# Patient Record
Sex: FEMALE | Race: WHITE | NOT HISPANIC OR LATINO | Employment: OTHER | URBAN - METROPOLITAN AREA
[De-identification: names, ages, dates, MRNs, and addresses within clinical notes are randomized per-mention and may not be internally consistent; named-entity substitution may affect disease eponyms.]

---

## 2017-01-05 ENCOUNTER — OFFICE VISIT (OUTPATIENT)
Dept: WOUND CARE | Facility: CLINIC | Age: 72
End: 2017-01-05
Payer: MEDICARE

## 2017-01-05 VITALS
HEART RATE: 68 BPM | HEIGHT: 67 IN | DIASTOLIC BLOOD PRESSURE: 54 MMHG | SYSTOLIC BLOOD PRESSURE: 138 MMHG | TEMPERATURE: 97 F

## 2017-01-05 DIAGNOSIS — I83.019 VENOUS ULCER OF BOTH LOWER EXTREMITIES WITH VARICOSE VEINS: ICD-10-CM

## 2017-01-05 DIAGNOSIS — L97.512 ULCER OF TOE, RIGHT, WITH FAT LAYER EXPOSED: ICD-10-CM

## 2017-01-05 DIAGNOSIS — L97.922 ULCER OF LEFT LOWER EXTREMITY WITH FAT LAYER EXPOSED: ICD-10-CM

## 2017-01-05 DIAGNOSIS — I87.2 VENOUS STASIS DERMATITIS OF BOTH LOWER EXTREMITIES: ICD-10-CM

## 2017-01-05 DIAGNOSIS — L97.919 VENOUS ULCER OF BOTH LOWER EXTREMITIES WITH VARICOSE VEINS: ICD-10-CM

## 2017-01-05 DIAGNOSIS — L97.912 ULCER OF LOWER LIMB, RIGHT, WITH FAT LAYER EXPOSED: ICD-10-CM

## 2017-01-05 DIAGNOSIS — I87.2 VENOUS INSUFFICIENCY OF BOTH LOWER EXTREMITIES: ICD-10-CM

## 2017-01-05 DIAGNOSIS — R60.0 BILATERAL LEG EDEMA: ICD-10-CM

## 2017-01-05 DIAGNOSIS — B35.3 TINEA PEDIS OF BOTH FEET: Chronic | ICD-10-CM

## 2017-01-05 DIAGNOSIS — I83.029 VENOUS ULCER OF BOTH LOWER EXTREMITIES WITH VARICOSE VEINS: ICD-10-CM

## 2017-01-05 DIAGNOSIS — L97.929 VENOUS ULCER OF BOTH LOWER EXTREMITIES WITH VARICOSE VEINS: ICD-10-CM

## 2017-01-05 PROCEDURE — 99999 PR PBB SHADOW E&M-EST. PATIENT-LVL III: CPT | Mod: PBBFAC,,, | Performed by: NURSE PRACTITIONER

## 2017-01-05 PROCEDURE — 29580 STRAPPING UNNA BOOT: CPT | Mod: RT,S$GLB,, | Performed by: NURSE PRACTITIONER

## 2017-01-05 PROCEDURE — 99499 UNLISTED E&M SERVICE: CPT | Mod: S$GLB,,, | Performed by: NURSE PRACTITIONER

## 2017-01-05 RX ORDER — ECONAZOLE NITRATE 10 MG/G
1 CREAM TOPICAL DAILY
Qty: 85 G | Refills: 2 | Status: SHIPPED | OUTPATIENT
Start: 2017-01-05 | End: 2018-05-25

## 2017-01-05 NOTE — PATIENT INSTRUCTIONS
Elevate legs as much as possible. Do not get the dressings wet and use cast covers for showering.  Should the dressing become wet, remove it, place a wet-to-dry dressing over the wound, cover with gauze and roll gauze and use ace wraps for compression and to secure bandages.  Notify home health as soon as possible to have a new dressing applied.

## 2017-01-05 NOTE — MR AVS SNAPSHOT
[facility]  [address]  [facility] [address]  Phone: [phone]                  Sherin Ortiz   2017 2:00 PM   Office Visit    Description:  Female : 1945   Provider:  Akosua Alvarenga NP   Department:  [facility]           Reason for Visit     Wound Check           Diagnoses this Visit        Comments    Uncontrolled type 2 diabetes mellitus with complication, with long-term current use of insulin    -  Primary     Ulcer of lower limb, right, with fat layer exposed         Ulcer of left lower extremity with fat layer exposed         Ulcer of toe, right, with fat layer exposed         Venous ulcer of both lower extremities with varicose veins         Tinea pedis of both feet         Venous stasis dermatitis of both lower extremities         Bilateral leg edema         Venous insufficiency of both lower extremities                To Do List           Future Appointments        Provider Department Dept Phone    2017 2:00 PM [facility] [phone]    2017 11:30 AM Ame Vasquez MD [facility] - Internal Medicine [phone]      Goals (5 Years of Data)     None      Follow-Up and Disposition     Return in about 2 weeks (around 2017) for Wound evaluation.      [facility]     [facility] On Call Nurse Care Line -  Assistance  Registered nurses in the [facility] On Call Center provide clinical advisement, health education, appointment booking, and other advisory services.  Call for this free service at [phone].             Medications           Message regarding Medications     Verify the changes and/or additions to your medication regime listed below are the same as discussed with your clinician today.  If any of these changes or additions are incorrect, please notify your healthcare provider.             Verify that the below list of medications is an accurate representation of the medications you are currently  "taking.  If none reported, the list may be blank. If incorrect, please contact your healthcare provider. Carry this list with you in case of emergency.           Current Medications     ACCU-CHEK RAMIRO PLUS METER Misc     ACCU-CHEK RAMIRO PLUS TEST STRP Strp TEST TWICE DAILY    ACCU-CHEK SOFTCLIX LANCETS Misc     aspirin 81 MG Chew Take 81 mg by mouth once daily.      atorvastatin (LIPITOR) 40 MG tablet TAKE 1 TABLET EVERY EVENING    BD INSULIN SYRINGE ULTRA-FINE 1/2 mL 30 x 1/2" Syrg USE EVERY NIGHT    clopidogrel (PLAVIX) 75 mg tablet TAKE 1 TABLET ONE TIME DAILY    econazole nitrate 1 % cream Apply 1 application topically once daily. Apply to affected area    glimepiride (AMARYL) 1 MG tablet Take 2 tablets (2 mg total) by mouth with lunch.    LANTUS 100 unit/mL injection INJECT 7 TO 10 UNITS SUBCUTANEOUSLY IN THE EVENING DEPENDING ON SCALE (DISCARD EACH VIAL AFTER 28 DAYS)    lisinopril 10 MG tablet TAKE 1 TABLET ONE TIME DAILY           Clinical Reference Information           Vital Signs - Last Recorded  Most recent update: 1/5/2017  2:01 PM by Anna Robertson LPN    BP Pulse Temp Ht LMP       (!) 138/54 68 96.8 °F (36 °C) (Oral) 5' 7" (1.702 m) (LMP Unknown)       Blood Pressure          Most Recent Value    BP  (!)  138/54      Allergies as of 1/5/2017     Penicillins    Sulfa (Sulfonamide Antibiotics)      Immunizations Administered on Date of Encounter - 1/5/2017     None      [facility] Sign-Up     Activating your [facility] account is as easy as 1-2-3!     1) Visit [email or web address], select Sign Up Now, enter this activation code and your date of birth, then select Next.  [email or web address]  Expires: 1/29/2017  2:16 PM      2) Create a username and password to use when you visit [facility] in the future and select a security question in case you lose your password and select Next.    3) Enter your e-mail address and click Sign Up!    Additional Information  If you have questions, please e-mail " [email or web address] or call [phone] to talk to our [facility] staff. Remember, [facility] is NOT to be used for urgent needs. For medical emergencies, dial 911.         Instructions    Elevate legs as much as possible. Do not get the dressings wet and use cast covers for showering.  Should the dressing become wet, remove it, place a wet-to-dry dressing over the wound, cover with gauze and roll gauze and use ace wraps for compression and to secure bandages.  Notify home health as soon as possible to have a new dressing applied.

## 2017-01-05 NOTE — PROGRESS NOTES
Subjective:       Patient ID: Sherin Ortiz is a 72 y.o. female.    Chief Complaint: Wound Check    Wound Check     Wound Check:   This patient is seen today for reevaluation of wounds to both lower legs and the right foot.  She has unna boots on bilaterally. The wounds are healing slowly as evidenced by wound contracture.    Problems that interfere with wound healing include multiple co-morbidities, diabetes, edema, diabetic neuropathy and  morbid obesity.  The patient is afebrile. The patient ambulates with great difficulty secondary to her body habitus.  She has no pain.  She denies increased swelling, redness or purulent drainage.   She is afebrile.   Review of Systems   Constitutional: Negative for chills, diaphoresis and fever.   HENT: Negative for hearing loss, postnasal drip, rhinorrhea, sinus pressure, sneezing, sore throat, tinnitus and trouble swallowing.    Eyes: Negative for visual disturbance.   Respiratory: Negative for apnea, cough, shortness of breath and wheezing.    Cardiovascular: Positive for leg swelling. Negative for chest pain and palpitations.   Gastrointestinal: Negative for constipation, diarrhea, nausea and vomiting.   Genitourinary: Negative for difficulty urinating, dysuria, frequency and hematuria.   Musculoskeletal: Negative for arthralgias, back pain and joint swelling.   Skin: Positive for wound.   Neurological: Negative for dizziness, weakness, light-headedness and headaches.   Hematological: Does not bruise/bleed easily.   Psychiatric/Behavioral: Negative for confusion, decreased concentration, dysphoric mood and sleep disturbance. The patient is not nervous/anxious.    All other systems reviewed and are negative.      Objective:      Physical Exam   Constitutional: She is oriented to person, place, and time. No distress.   Morbidly obese   HENT:   Head: Normocephalic and atraumatic.   Neck: Normal range of motion. Neck supple.   Pulmonary/Chest: Effort normal.    Musculoskeletal: Normal range of motion. She exhibits edema. She exhibits no tenderness.        Legs:       Feet:    Neurological: She is alert and oriented to person, place, and time.   Skin: Skin is warm and dry. Rash (dermatitis and tinea bilateral lower extremities) noted. She is not diaphoretic. No cyanosis or erythema. No pallor. Nails show no clubbing.   Psychiatric: She has a normal mood and affect. Her behavior is normal. Judgment and thought content normal.   Nursing note and vitals reviewed.    ..  Hemoglobin A1C   Date Value Ref Range Status   11/08/2016 9.9 (H) 4.5 - 6.2 % Final     Comment:     According to ADA guidelines, hemoglobin A1C <7.0% represents  optimal control in non-pregnant diabetic patients.  Different  metrics may apply to specific populations.   Standards of Medical Care in Diabetes - 2016.  For the purpose of screening for the presence of diabetes:  <5.7%     Consistent with the absence of diabetes  5.7-6.4%  Consistent with increasing risk for diabetes   (prediabetes)  >or=6.5%  Consistent with diabetes  Currently no consensus exists for use of hemoglobin A1C  for diagnosis of diabetes for children.     05/26/2016 9.1 (H) 4.5 - 6.2 % Final   02/16/2016 9.1 (H) 4.5 - 6.2 % Final     ..  Lab Results   Component Value Date    ALBUMIN 2.9 (L) 11/08/2016     Sherin was seen in the clinic room and placed in the supine position on the treatment table.  Both lower legs were cleansed with Easi-clense sponges and dried thoroughly.  Medihoney gel and a hydrofiber dressing was applied to the right leg wounds.  Eucerin cream was applied to the lower legs.  The ankle was padded with ABD pads.  The patient's foot was positioned at a 90 degree angle.  A zinc oxide wrap, followed by kerlix roll gauze and coban were applied using a spiral technique avoiding creases or folds.  The wrap was started behind the first metatarsal and ended below the tibial tubercle of the knee.  There was overlap of each  turn half the width of the previous turn.  The compression wrap will be changed every 3-4 days.      Assessment:       1. Uncontrolled type 2 diabetes mellitus with complication, with long-term current use of insulin    2. Ulcer of lower limb, right, with fat layer exposed    3. Ulcer of left lower extremity with fat layer exposed    4. Ulcer of toe, right, with fat layer exposed    5. Venous ulcer of both lower extremities with varicose veins    6. Tinea pedis of both feet    7. Venous stasis dermatitis of both lower extremities    8. Bilateral leg edema        Plan:           Spectazole cream to feet and toes.  Triamcinolone cream to lower legs.  Unna boot right lower leg as detailed above.  Kerlix and coban left lower leg.  Patient was warned not to get the dressings wet and to use cast covers for showering.  Should the dressing become wet, she is to remove it, place a wet-to-dry dressing over the wound, cover with gauze and roll gauze and use ace wraps for compression and to secure bandages.  She should then notify home health as soon as possible to have a new dressing applied.  Apply hydrofiber dressing to right second to and left great toe, cotton in between toes, cover with gauze and secure with roll gauze.  Change dressing twice weekly.  Return to clinic in 2 weeks.  [facility] notified of orders via email.      Home Health Wound Care Orders.  Cleanse wounds with wound .  Spectazole cream to feet and toes daily.  Triamcinolone cream to lower legs.  Medihoney gel and hydrofiber dressing to right leg wounds.  ABD pad to both ankles and right shin.  Unna boot (zinc oxide, kerlix and coban) bilateral lower legs.  Kerlix and coban to left lower leg.  Aquacel to right second toe ulcer and left great toe ulcer, cotton in between toes, cover right foot wound with gauze and secure with roll gauze.  Change toe dressing twice weekly.  Change unna boot twice weekly.    Right shin      Right second toe      Left  great toe

## 2017-01-08 RX ORDER — BLOOD SUGAR DIAGNOSTIC
STRIP MISCELLANEOUS
Qty: 200 STRIP | Refills: 3 | Status: SHIPPED | OUTPATIENT
Start: 2017-01-08 | End: 2018-01-20 | Stop reason: SDUPTHER

## 2017-01-24 ENCOUNTER — TELEPHONE (OUTPATIENT)
Dept: WOUND CARE | Facility: CLINIC | Age: 72
End: 2017-01-24

## 2017-01-24 NOTE — TELEPHONE ENCOUNTER
----- Message from Domenic Doan sent at 1/24/2017 11:24 AM CST -----  Patient states that she needs to speak with nurse in ref to some questions about her appts//please call back at [phone]//thank you

## 2017-02-09 ENCOUNTER — OFFICE VISIT (OUTPATIENT)
Dept: WOUND CARE | Facility: CLINIC | Age: 72
End: 2017-02-09
Payer: MEDICARE

## 2017-02-09 VITALS — TEMPERATURE: 98 F | HEART RATE: 78 BPM | DIASTOLIC BLOOD PRESSURE: 77 MMHG | SYSTOLIC BLOOD PRESSURE: 134 MMHG

## 2017-02-09 DIAGNOSIS — L97.912 ULCER OF LOWER LIMB, RIGHT, WITH FAT LAYER EXPOSED: ICD-10-CM

## 2017-02-09 DIAGNOSIS — L97.919 VENOUS ULCER OF RIGHT LOWER EXTREMITY WITH VARICOSE VEINS: ICD-10-CM

## 2017-02-09 DIAGNOSIS — B35.3 TINEA PEDIS OF BOTH FEET: Chronic | ICD-10-CM

## 2017-02-09 DIAGNOSIS — L97.512 ULCER OF TOE, RIGHT, WITH FAT LAYER EXPOSED: ICD-10-CM

## 2017-02-09 DIAGNOSIS — R60.0 BILATERAL LEG EDEMA: ICD-10-CM

## 2017-02-09 DIAGNOSIS — E88.09 HYPOALBUMINEMIA: ICD-10-CM

## 2017-02-09 DIAGNOSIS — I83.019 VENOUS ULCER OF RIGHT LOWER EXTREMITY WITH VARICOSE VEINS: ICD-10-CM

## 2017-02-09 DIAGNOSIS — I87.2 VENOUS INSUFFICIENCY OF BOTH LOWER EXTREMITIES: ICD-10-CM

## 2017-02-09 DIAGNOSIS — I87.2 VENOUS STASIS DERMATITIS OF BOTH LOWER EXTREMITIES: ICD-10-CM

## 2017-02-09 PROCEDURE — 99999 PR PBB SHADOW E&M-EST. PATIENT-LVL IV: CPT | Mod: PBBFAC,,, | Performed by: NURSE PRACTITIONER

## 2017-02-09 PROCEDURE — 29580 STRAPPING UNNA BOOT: CPT | Mod: RT,S$GLB,, | Performed by: NURSE PRACTITIONER

## 2017-02-09 PROCEDURE — 99499 UNLISTED E&M SERVICE: CPT | Mod: S$GLB,,, | Performed by: NURSE PRACTITIONER

## 2017-02-09 NOTE — PROGRESS NOTES
Subjective:       Patient ID: Sherin Ortiz is a 72 y.o. female.    Chief Complaint: Wound Check    Wound Check     Wound Check:   This patient is seen today for reevaluation of wounds to the right lower leg and both feet. An unna boot was on the right leg but she removed it before coming to clinic today to shower.  She has a new wound to the proximal shin that began as a blister that erupted on 2/6/18. The wounds are healing slowly as evidenced by wound contracture.    Problems that interfere with wound healing include multiple co-morbidities, diabetes, edema, diabetic neuropathy and  morbid obesity. The patient is afebrile. The patient ambulates with great difficulty secondary to her body habitus.  She has no pain.  She denies increased swelling, redness or purulent drainage.   She is afebrile.   Review of Systems   Constitutional: Negative for chills, diaphoresis and fever.   HENT: Negative for hearing loss, postnasal drip, rhinorrhea, sinus pressure, sneezing, sore throat, tinnitus and trouble swallowing.    Eyes: Negative for visual disturbance.   Respiratory: Negative for apnea, cough, shortness of breath and wheezing.    Cardiovascular: Positive for leg swelling. Negative for chest pain and palpitations.   Gastrointestinal: Negative for constipation, diarrhea, nausea and vomiting.   Genitourinary: Negative for difficulty urinating, dysuria, frequency and hematuria.   Musculoskeletal: Negative for arthralgias, back pain and joint swelling.   Skin: Positive for wound.   Neurological: Negative for dizziness, weakness, light-headedness and headaches.   Hematological: Does not bruise/bleed easily.   Psychiatric/Behavioral: Negative for confusion, decreased concentration, dysphoric mood and sleep disturbance. The patient is not nervous/anxious.    All other systems reviewed and are negative.      Objective:      Physical Exam   Constitutional: She is oriented to person, place, and time. No distress.   Morbidly  obese   HENT:   Head: Normocephalic and atraumatic.   Neck: Normal range of motion. Neck supple.   Pulmonary/Chest: Effort normal.   Musculoskeletal: Normal range of motion. She exhibits edema. She exhibits no tenderness.        Legs:       Feet:    Neurological: She is alert and oriented to person, place, and time.   Skin: Skin is warm and dry. Rash (dermatitis and tinea bilateral lower extremities) noted. She is not diaphoretic. No cyanosis or erythema. No pallor. Nails show no clubbing.   Psychiatric: She has a normal mood and affect. Her behavior is normal. Judgment and thought content normal.   Nursing note and vitals reviewed.    ..  Hemoglobin A1C   Date Value Ref Range Status   11/08/2016 9.9 (H) 4.5 - 6.2 % Final     Comment:     According to ADA guidelines, hemoglobin A1C <7.0% represents  optimal control in non-pregnant diabetic patients.  Different  metrics may apply to specific populations.   Standards of Medical Care in Diabetes - 2016.  For the purpose of screening for the presence of diabetes:  <5.7%     Consistent with the absence of diabetes  5.7-6.4%  Consistent with increasing risk for diabetes   (prediabetes)  >or=6.5%  Consistent with diabetes  Currently no consensus exists for use of hemoglobin A1C  for diagnosis of diabetes for children.     05/26/2016 9.1 (H) 4.5 - 6.2 % Final   02/16/2016 9.1 (H) 4.5 - 6.2 % Final     ..  Lab Results   Component Value Date    ALBUMIN 2.9 (L) 11/08/2016     Sherin was seen in the clinic room and placed in the supine position on the treatment table.  Both lower legs were cleansed with Easi-clense sponges and dried thoroughly.  Medihoney gel and a hydrofiber dressing was applied to the right leg wounds.  Eucerin cream was applied to the lower legs.  The ankle was padded with ABD pads.  The patient's foot was positioned at a 90 degree angle.  A zinc oxide wrap, followed by kerlix roll gauze and coban were applied using a spiral technique avoiding creases or  folds.  The wrap was started behind the first metatarsal and ended below the tibial tubercle of the knee.  There was overlap of each turn half the width of the previous turn.  The compression wrap will be changed every 3-4 days.      Assessment:       1. Uncontrolled type 2 diabetes mellitus with complication, with long-term current use of insulin    2. Ulcer of toe, right, with fat layer exposed    3. Ulcer of lower limb, right, with fat layer exposed    4. Ulcer of left lower extremity with fat layer exposed    5. Venous ulcer of both lower extremities with varicose veins    6. Tinea pedis of both feet    7. Venous stasis dermatitis of both lower extremities    8. Hypoalbuminemia    9. Bilateral leg edema        Plan:           Spectazole cream to feet and toes.  Triamcinolone cream to lower legs.  Unna boot right lower leg as detailed above.  Kerlix and coban left lower leg.  Patient was warned not to get the dressings wet and to use cast covers for showering.  Should the dressing become wet, she is to remove it, place a wet-to-dry dressing over the wound, cover with gauze and roll gauze and use ace wraps for compression and to secure bandages.  She should then notify home health as soon as possible to have a new dressing applied.  Apply hydrofiber dressing to right second ulcer, cotton in between toes, cover with gauze and secure with roll gauze.  Change dressing twice weekly.  Return to clinic in 2 weeks.  [facility] notified of orders via email.      Home Health Wound Care Orders.  Cleanse wounds with wound .  Spectazole cream to feet and toes daily.  Triamcinolone cream to lower legs.  Medihoney gel and hydrofiber dressing to right leg wounds.  ABD pad to both ankles and right shin.  Unna boot (zinc oxide, kerlix and coban) bilateral lower legs.  Kerlix and coban to left lower leg.  Aquacel to right second toe ulcer and left great toe ulcer, cotton in between toes, cover right foot wound with gauze and  secure with roll gauze.  Change toe dressing twice weekly.  Change unna boot twice weekly.    Right shin      Right second toe      Left great toe

## 2017-02-09 NOTE — MR AVS SNAPSHOT
[facility]  [address]  [facility] [address]  Phone: [phone]                  Sherin Ortiz   2017 2:00 PM   Office Visit    Description:  Female : 1945   Provider:  Akosua Alvarenga NP   Department:  [facility]           Reason for Visit     Wound Check           Diagnoses this Visit        Comments    Uncontrolled type 2 diabetes mellitus with complication, with long-term current use of insulin    -  Primary     Ulcer of toe, right, with fat layer exposed         Ulcer of lower limb, right, with fat layer exposed         Venous ulcer of right lower extremity with varicose veins         Tinea pedis of both feet         Venous stasis dermatitis of both lower extremities         Hypoalbuminemia         Bilateral leg edema         Venous insufficiency of both lower extremities                To Do List           Future Appointments        Provider Department Dept Phone    2017 1:00 PM [facility] [phone]    3/8/2017 3:30 PM Ame Vasquez MD [facility] - Internal Medicine [phone]      Goals (5 Years of Data)     None      Follow-Up and Disposition     Return in about 2 weeks (around 2017) for Wound evaluation.    Follow-up and Disposition History      [facility]     [facility] -  Assistance  Registered nurses in the [facility] On Call Center provide clinical advisement, health education, appointment booking, and other advisory services.  Call for this free service at [phone].             Medications           Message regarding Medications     Verify the changes and/or additions to your medication regime listed below are the same as discussed with your clinician today.  If any of these changes or additions are incorrect, please notify your healthcare provider.             Verify that the below list of medications is an accurate representation of the medications you are currently  "taking.  If none reported, the list may be blank. If incorrect, please contact your healthcare provider. Carry this list with you in case of emergency.           Current Medications     ACCU-CHEK RAMIRO PLUS METER Misc     ACCU-CHEK RAMIRO PLUS TEST STRP Strp TEST TWICE DAILY    ACCU-CHEK SOFTCLIX LANCETS Misc     aspirin 81 MG Chew Take 81 mg by mouth once daily.      atorvastatin (LIPITOR) 40 MG tablet TAKE 1 TABLET EVERY EVENING    BD INSULIN SYRINGE ULTRA-FINE 1/2 mL 30 x 1/2" Syrg USE EVERY NIGHT    clopidogrel (PLAVIX) 75 mg tablet TAKE 1 TABLET ONE TIME DAILY    econazole nitrate 1 % cream Apply 1 application topically once daily. Apply to affected area    glimepiride (AMARYL) 1 MG tablet Take 2 tablets (2 mg total) by mouth with lunch.    LANTUS 100 unit/mL injection INJECT 7 TO 10 UNITS SUBCUTANEOUSLY IN THE EVENING DEPENDING ON SCALE (DISCARD EACH VIAL AFTER 28 DAYS)    lisinopril 10 MG tablet TAKE 1 TABLET ONE TIME DAILY           Clinical Reference Information           Your Vitals Were     BP Pulse Temp Last Period          134/77 78 97.5 °F (36.4 °C) (Oral) (LMP Unknown)        Blood Pressure          Most Recent Value    BP  134/77      Allergies as of 2/9/2017     Penicillins    Sulfa (Sulfonamide Antibiotics)      Immunizations Administered on Date of Encounter - 2/9/2017     None      [facility] Sign-Up     Activating your [facility] account is as easy as 1-2-3!     1) Visit [email or web address], select Sign Up Now, enter this activation code and your date of birth, then select Next.  [email or web address]  Expires: 3/26/2017  1:55 PM      2) Create a username and password to use when you visit [facility] in the future and select a security question in case you lose your password and select Next.    3) Enter your e-mail address and click Sign Up!    Additional Information  If you have questions, please e-mail [email or web address] or call [phone] to talk to our [facility] staff. Remember, [facility] is NOT to " be used for urgent needs. For medical emergencies, dial 911.         Instructions    Elevate legs as much as possible. Do not get the dressings wet and use cast covers for showering.  Should the dressing become wet, remove it, place a wet-to-dry dressing over the wound, cover with gauze and roll gauze and use ace wraps for compression and to secure bandages.  Notify home health as soon as possible to have a new dressing applied.         Language Assistance Services     ATTENTION: Language assistance services are available, free of charge. Please call [phone].      ATENCIÓN: Si habla español, tiene a hutchins disposición servicios gratuitos de asistencia lingüística. Llame al [phone].     MELINA Ý: N?u b?n nói Ti?ng Vi?t, có các d?ch v? h? tr? ngôn ng? mi?n phí dành cho b?n. G?i s? [phone].         [facility] complies with applicable Federal civil rights laws and does not discriminate on the basis of race, color, national origin, age, disability, or sex.

## 2017-02-21 ENCOUNTER — TELEPHONE (OUTPATIENT)
Dept: INTERNAL MEDICINE | Facility: CLINIC | Age: 72
End: 2017-02-21

## 2017-02-21 DIAGNOSIS — R26.9 GAIT ABNORMALITY: Primary | ICD-10-CM

## 2017-02-21 NOTE — TELEPHONE ENCOUNTER
Please advise it is ok to give a verbal to re certify pt for home health for wound care for her leg two times a week.    Also, pt is requesting orders for a bedside commode.

## 2017-02-22 NOTE — TELEPHONE ENCOUNTER
Left message informing Victorina that it is ok to continue pt home health. Also that orders for commode will be faxed to [facility].

## 2017-02-23 ENCOUNTER — OFFICE VISIT (OUTPATIENT)
Dept: WOUND CARE | Facility: CLINIC | Age: 72
End: 2017-02-23
Payer: MEDICARE

## 2017-02-23 VITALS
HEIGHT: 67 IN | HEART RATE: 81 BPM | TEMPERATURE: 98 F | DIASTOLIC BLOOD PRESSURE: 56 MMHG | SYSTOLIC BLOOD PRESSURE: 168 MMHG

## 2017-02-23 DIAGNOSIS — L97.512 ULCER OF TOE, RIGHT, WITH FAT LAYER EXPOSED: ICD-10-CM

## 2017-02-23 DIAGNOSIS — L97.922 ULCER OF LEFT LOWER EXTREMITY WITH FAT LAYER EXPOSED: ICD-10-CM

## 2017-02-23 DIAGNOSIS — B35.3 TINEA PEDIS OF BOTH FEET: Chronic | ICD-10-CM

## 2017-02-23 DIAGNOSIS — L97.912 ULCER OF LOWER LIMB, RIGHT, WITH FAT LAYER EXPOSED: ICD-10-CM

## 2017-02-23 DIAGNOSIS — R60.0 BILATERAL LEG EDEMA: ICD-10-CM

## 2017-02-23 DIAGNOSIS — I87.2 VENOUS STASIS DERMATITIS OF BOTH LOWER EXTREMITIES: ICD-10-CM

## 2017-02-23 DIAGNOSIS — I83.019 VENOUS ULCER OF BOTH LOWER EXTREMITIES WITH VARICOSE VEINS: ICD-10-CM

## 2017-02-23 DIAGNOSIS — L97.919 VENOUS ULCER OF BOTH LOWER EXTREMITIES WITH VARICOSE VEINS: ICD-10-CM

## 2017-02-23 DIAGNOSIS — L97.929 VENOUS ULCER OF BOTH LOWER EXTREMITIES WITH VARICOSE VEINS: ICD-10-CM

## 2017-02-23 DIAGNOSIS — I83.029 VENOUS ULCER OF BOTH LOWER EXTREMITIES WITH VARICOSE VEINS: ICD-10-CM

## 2017-02-23 PROCEDURE — 99499 UNLISTED E&M SERVICE: CPT | Mod: S$GLB,,, | Performed by: NURSE PRACTITIONER

## 2017-02-23 PROCEDURE — 99999 PR PBB SHADOW E&M-EST. PATIENT-LVL IV: CPT | Mod: PBBFAC,,, | Performed by: NURSE PRACTITIONER

## 2017-02-23 PROCEDURE — 29580 STRAPPING UNNA BOOT: CPT | Mod: 50,S$GLB,, | Performed by: NURSE PRACTITIONER

## 2017-02-23 NOTE — MR AVS SNAPSHOT
[facility]  [address]  [facility] [address]  Phone: [phone]                  Sherin Ortiz   2017 1:00 PM   Office Visit    Description:  Female : 1945   Provider:  Akosua Alvarenga NP   Department:  [facility]           Reason for Visit     Wound Check           Diagnoses this Visit        Comments    Uncontrolled type 2 diabetes mellitus with complication, with long-term current use of insulin    -  Primary     Ulcer of lower limb, right, with fat layer exposed         Ulcer of left lower extremity with fat layer exposed         Ulcer of toe, right, with fat layer exposed         Venous ulcer of both lower extremities with varicose veins         Tinea pedis of both feet         Venous stasis dermatitis of both lower extremities         Bilateral leg edema                To Do List           Future Appointments        Provider Department Dept Phone    3/8/2017 3:30 PM MD [facility] [phone]      Goals (5 Years of Data)     None      Follow-Up and Disposition     Return in about 2 weeks (around 3/9/2017) for Wound evaluation.      [facility]     [facility] On Call Nurse [facility] Line - 24/7 Assistance  Registered nurses in the [facility] On Call Center provide clinical advisement, health education, appointment booking, and other advisory services.  Call for this free service at [phone].             Medications           Message regarding Medications     Verify the changes and/or additions to your medication regime listed below are the same as discussed with your clinician today.  If any of these changes or additions are incorrect, please notify your healthcare provider.             Verify that the below list of medications is an accurate representation of the medications you are currently taking.  If none reported, the list may be blank. If incorrect, please contact your healthcare provider. Carry this list with you  "in case of emergency.           Current Medications     ACCU-CHEK RAMIRO PLUS METER Misc     ACCU-CHEK RAMIRO PLUS TEST STRP Strp TEST TWICE DAILY    ACCU-CHEK SOFTCLIX LANCETS Misc     aspirin 81 MG Chew Take 81 mg by mouth once daily.      atorvastatin (LIPITOR) 40 MG tablet TAKE 1 TABLET EVERY EVENING    BD INSULIN SYRINGE ULTRA-FINE 1/2 mL 30 x 1/2" Syrg USE EVERY NIGHT    clopidogrel (PLAVIX) 75 mg tablet TAKE 1 TABLET ONE TIME DAILY    econazole nitrate 1 % cream Apply 1 application topically once daily. Apply to affected area    glimepiride (AMARYL) 1 MG tablet Take 2 tablets (2 mg total) by mouth with lunch.    LANTUS 100 unit/mL injection INJECT 7 TO 10 UNITS SUBCUTANEOUSLY IN THE EVENING DEPENDING ON SCALE (DISCARD EACH VIAL AFTER 28 DAYS)    lisinopril 10 MG tablet TAKE 1 TABLET ONE TIME DAILY           Clinical Reference Information           Your Vitals Were     BP Pulse Temp Height Last Period       168/56 81 97.5 °F (36.4 °C) (Oral) 5' 7" (1.702 m) (LMP Unknown)       Blood Pressure          Most Recent Value    BP  (!)  168/56      Allergies as of 2/23/2017     Penicillins    Sulfa (Sulfonamide Antibiotics)      Immunizations Administered on Date of Encounter - 2/23/2017     None      [facility] Sign-Up     Activating your [facility] account is as easy as 1-2-3!     1) Visit [email or web address], select Sign Up Now, enter this activation code and your date of birth, then select Next.  [email or web address]  Expires: 3/26/2017  1:55 PM      2) Create a username and password to use when you visit [facility] in the future and select a security question in case you lose your password and select Next.    3) Enter your e-mail address and click Sign Up!    Additional Information  If you have questions, please e-mail [email or web address] or call [phone] to talk to our [facility] staff. Remember, [facility] is NOT to be used for urgent needs. For medical emergencies, dial 911.         Instructions    Elevate legs as " much as possible. Do not get the dressings wet and use cast covers for showering.  Should the dressing become wet, remove it, place a wet-to-dry dressing over the wound, cover with gauze and roll gauze and use ace wraps for compression and to secure bandages.  Notify home health as soon as possible to have a new dressing applied.         Language Assistance Services     ATTENTION: Language assistance services are available, free of charge. Please call [phone].      ATENCIÓN: Si habla thomas, tiene a hutchins disposición servicios gratuitos de asistencia lingüística. Llame al [phone].     CHÚ Ý: N?u b?n nói Ti?ng Vi?t, có các d?ch v? h? tr? ngôn ng? mi?n phí dành cho b?n. G?i s? [phone].         [facility] complies with applicable Federal civil rights laws and does not discriminate on the basis of race, color, national origin, age, disability, or sex.

## 2017-02-23 NOTE — PROGRESS NOTES
Subjective:       Patient ID: Sherin Ortiz is a 72 y.o. female.    Chief Complaint: Wound Check    Wound Check     Wound Check:   This patient is seen today for reevaluation of wounds to the right lower leg and both feet. An unna boot was on the right leg but she removed it before coming to clinic today to shower.  She has a new wound to the proximal shin that began as a blister that erupted on 2/6/18. The wounds are healing slowly as evidenced by wound contracture.    Problems that interfere with wound healing include multiple co-morbidities, diabetes, edema, diabetic neuropathy and  morbid obesity. The patient is afebrile. The patient ambulates with great difficulty secondary to her body habitus.  She has no pain.  She denies increased swelling, redness or purulent drainage.   She is afebrile.   Review of Systems   Constitutional: Negative for chills, diaphoresis and fever.   HENT: Negative for hearing loss, postnasal drip, rhinorrhea, sinus pressure, sneezing, sore throat, tinnitus and trouble swallowing.    Eyes: Negative for visual disturbance.   Respiratory: Negative for apnea, cough, shortness of breath and wheezing.    Cardiovascular: Positive for leg swelling. Negative for chest pain and palpitations.   Gastrointestinal: Negative for constipation, diarrhea, nausea and vomiting.   Genitourinary: Negative for difficulty urinating, dysuria, frequency and hematuria.   Musculoskeletal: Negative for arthralgias, back pain and joint swelling.   Skin: Positive for wound.   Neurological: Negative for dizziness, weakness, light-headedness and headaches.   Hematological: Does not bruise/bleed easily.   Psychiatric/Behavioral: Negative for confusion, decreased concentration, dysphoric mood and sleep disturbance. The patient is not nervous/anxious.    All other systems reviewed and are negative.      Objective:      Physical Exam   Constitutional: She is oriented to person, place, and time. No distress.   Morbidly  obese   HENT:   Head: Normocephalic and atraumatic.   Neck: Normal range of motion. Neck supple.   Pulmonary/Chest: Effort normal.   Musculoskeletal: Normal range of motion. She exhibits edema. She exhibits no tenderness.        Legs:       Feet:    Neurological: She is alert and oriented to person, place, and time.   Skin: Skin is warm and dry. Rash (dermatitis and tinea bilateral lower extremities) noted. She is not diaphoretic. No cyanosis or erythema. No pallor. Nails show no clubbing.   Psychiatric: She has a normal mood and affect. Her behavior is normal. Judgment and thought content normal.   Nursing note and vitals reviewed.    ..  Hemoglobin A1C   Date Value Ref Range Status   11/08/2016 9.9 (H) 4.5 - 6.2 % Final     Comment:     According to ADA guidelines, hemoglobin A1C <7.0% represents  optimal control in non-pregnant diabetic patients.  Different  metrics may apply to specific populations.   Standards of Medical Care in Diabetes - 2016.  For the purpose of screening for the presence of diabetes:  <5.7%     Consistent with the absence of diabetes  5.7-6.4%  Consistent with increasing risk for diabetes   (prediabetes)  >or=6.5%  Consistent with diabetes  Currently no consensus exists for use of hemoglobin A1C  for diagnosis of diabetes for children.     05/26/2016 9.1 (H) 4.5 - 6.2 % Final   02/16/2016 9.1 (H) 4.5 - 6.2 % Final     ..  Lab Results   Component Value Date    ALBUMIN 2.9 (L) 11/08/2016     Sherin was seen in the clinic room and placed in the supine position on the treatment table.  Both lower legs were cleansed with Easi-clense sponges and dried thoroughly.  Medihoney gel and a hydrofiber dressing was applied to the both leg wounds.  Eucerin cream was applied to the lower legs.  The ankle was padded with ABD pads.  The patient's feet was positioned at a 90 degree angle.  A zinc oxide wrap, followed by kerlix roll gauze and coban were applied using a spiral technique avoiding creases or folds.   The wraps were started behind the first metatarsal and ended below the tibial tubercle of the knee.  There was overlap of each turn half the width of the previous turn.  The compression wraps will be changed every 3-4 days.      Assessment:       1. Uncontrolled type 2 diabetes mellitus with complication, with long-term current use of insulin    2. Ulcer of lower limb, right, with fat layer exposed    3. Ulcer of left lower extremity with fat layer exposed    4. Ulcer of toe, right, with fat layer exposed    5. Venous ulcer of both lower extremities with varicose veins    6. Tinea pedis of both feet    7. Venous stasis dermatitis of both lower extremities    8. Bilateral leg edema        Plan:           Spectazole cream to feet and toes.  Triamcinolone cream to lower legs.  Unna boot bilateral lower legs as detailed above.  Patient was warned not to get the dressings wet and to use cast covers for showering.  Should the dressing become wet, she is to remove it, place a wet-to-dry dressing over the wound, cover with gauze and roll gauze and use ace wraps for compression and to secure bandages.  She should then notify home health as soon as possible to have a new dressing applied.  Apply medihoney gel and hydrofiber dressing to right second ulcer, cotton in between toes, cover with gauze and secure with roll gauze.  Change dressing twice weekly.  Return to clinic in 2 weeks.  [facility] notified of orders via email.      Home Health Wound Care Orders.  Cleanse wounds with wound .  Spectazole cream to feet and toes daily.  Triamcinolone cream to lower legs.  Medihoney gel and hydrofiber dressing to bilateral leg wounds.  ABD pad to both ankles and right shin.  Unna boot (zinc oxide, kerlix and coban) bilateral lower legs.  Medihoney gel and hydrofiber to right second toe ulcer and left great toe ulcer, cotton in between toes, cover right foot wound with gauze and secure with roll gauze.  Change toe dressing  twice weekly.  Change unna boots twice weekly.    Right shin      Left medial leg      Left lateral leg      Right second toe

## 2017-02-27 RX ORDER — INSULIN GLARGINE 100 [IU]/ML
INJECTION, SOLUTION SUBCUTANEOUS
Qty: 30 ML | Refills: 3 | Status: SHIPPED | OUTPATIENT
Start: 2017-02-27 | End: 2018-03-05 | Stop reason: SDUPTHER

## 2017-03-08 ENCOUNTER — OFFICE VISIT (OUTPATIENT)
Dept: INTERNAL MEDICINE | Facility: CLINIC | Age: 72
End: 2017-03-08
Payer: MEDICARE

## 2017-03-08 VITALS
OXYGEN SATURATION: 98 % | HEART RATE: 66 BPM | DIASTOLIC BLOOD PRESSURE: 78 MMHG | SYSTOLIC BLOOD PRESSURE: 128 MMHG | HEIGHT: 66 IN

## 2017-03-08 DIAGNOSIS — Z99.3 WHEELCHAIR DEPENDENT: Chronic | ICD-10-CM

## 2017-03-08 DIAGNOSIS — E11.42 DIABETIC PERIPHERAL NEUROPATHY ASSOCIATED WITH TYPE 2 DIABETES MELLITUS: Primary | Chronic | ICD-10-CM

## 2017-03-08 DIAGNOSIS — E66.01 MORBID OBESITY WITH BMI OF 40.0-44.9, ADULT: ICD-10-CM

## 2017-03-08 DIAGNOSIS — E08.311 DIABETIC RETINOPATHY OF RIGHT EYE WITH MACULAR EDEMA ASSOCIATED WITH DIABETES MELLITUS DUE TO UNDERLYING CONDITION, UNSPECIFIED RETINOPATHY SEVERITY: ICD-10-CM

## 2017-03-08 DIAGNOSIS — N18.30 STAGE 3 CHRONIC KIDNEY DISEASE DUE TO TYPE 2 DIABETES MELLITUS: Chronic | ICD-10-CM

## 2017-03-08 DIAGNOSIS — E11.22 STAGE 3 CHRONIC KIDNEY DISEASE DUE TO TYPE 2 DIABETES MELLITUS: Chronic | ICD-10-CM

## 2017-03-08 PROCEDURE — 4010F ACE/ARB THERAPY RXD/TAKEN: CPT | Mod: S$GLB,,, | Performed by: INTERNAL MEDICINE

## 2017-03-08 PROCEDURE — 3074F SYST BP LT 130 MM HG: CPT | Mod: S$GLB,,, | Performed by: INTERNAL MEDICINE

## 2017-03-08 PROCEDURE — 99499 UNLISTED E&M SERVICE: CPT | Mod: S$GLB,,, | Performed by: INTERNAL MEDICINE

## 2017-03-08 PROCEDURE — 1157F ADVNC CARE PLAN IN RCRD: CPT | Mod: S$GLB,,, | Performed by: INTERNAL MEDICINE

## 2017-03-08 PROCEDURE — 1126F AMNT PAIN NOTED NONE PRSNT: CPT | Mod: S$GLB,,, | Performed by: INTERNAL MEDICINE

## 2017-03-08 PROCEDURE — 99999 PR PBB SHADOW E&M-EST. PATIENT-LVL IV: CPT | Mod: PBBFAC,,, | Performed by: INTERNAL MEDICINE

## 2017-03-08 PROCEDURE — 3078F DIAST BP <80 MM HG: CPT | Mod: S$GLB,,, | Performed by: INTERNAL MEDICINE

## 2017-03-08 PROCEDURE — 3046F HEMOGLOBIN A1C LEVEL >9.0%: CPT | Mod: S$GLB,,, | Performed by: INTERNAL MEDICINE

## 2017-03-08 PROCEDURE — 2022F DILAT RTA XM EVC RTNOPTHY: CPT | Mod: S$GLB,,, | Performed by: INTERNAL MEDICINE

## 2017-03-08 PROCEDURE — 1160F RVW MEDS BY RX/DR IN RCRD: CPT | Mod: S$GLB,,, | Performed by: INTERNAL MEDICINE

## 2017-03-08 PROCEDURE — 1159F MED LIST DOCD IN RCRD: CPT | Mod: S$GLB,,, | Performed by: INTERNAL MEDICINE

## 2017-03-08 PROCEDURE — 99214 OFFICE O/P EST MOD 30 MIN: CPT | Mod: S$GLB,,, | Performed by: INTERNAL MEDICINE

## 2017-03-08 RX ORDER — GLIMEPIRIDE 1 MG/1
2 TABLET ORAL
Qty: 180 TABLET | Refills: 3 | Status: ON HOLD | OUTPATIENT
Start: 2017-03-08 | End: 2019-03-27 | Stop reason: HOSPADM

## 2017-03-08 NOTE — PROGRESS NOTES
"Subjective:       Patient ID: Sherin Ortiz is a 72 y.o. female.    Chief Complaint: Follow-up    HPI   Patient continues to be followed in Wound Care, Home Health through [facility] provides dressing change on Mondays and Thursdays. Patient states the wound is healing.    Diabetes type 2  medication compliance: compliant all of the time, diabetic diet compliance: compliant most of the time, home glucose monitoring: is performed regularly  /78  Pulse 66  Ht 5' 6" (1.676 m)  LMP  (LMP Unknown)  SpO2 98%,   Hemoglobin A1C   Date Value Ref Range Status   11/08/2016 9.9 (H) 4.5 - 6.2 % Final     Comment:     According to ADA guidelines, hemoglobin A1C <7.0% represents  optimal control in non-pregnant diabetic patients.  Different  metrics may apply to specific populations.   Standards of Medical Care in Diabetes - 2016.  For the purpose of screening for the presence of diabetes:  <5.7%     Consistent with the absence of diabetes  5.7-6.4%  Consistent with increasing risk for diabetes   (prediabetes)  >or=6.5%  Consistent with diabetes  Currently no consensus exists for use of hemoglobin A1C  for diagnosis of diabetes for children.     05/26/2016 9.1 (H) 4.5 - 6.2 % Final   02/16/2016 9.1 (H) 4.5 - 6.2 % Final   ,   Creatinine   Date Value Ref Range Status   11/08/2016 1.6 (H) 0.5 - 1.4 mg/dL Final   05/26/2016 1.4 0.5 - 1.4 mg/dL Final   04/20/2016 1.8 (H) 0.5 - 1.4 mg/dL Final       Lab Results   Component Value Date    LDLCALC 56.6 (L) 11/08/2016    LDLCALC 76.0 05/26/2016    LDLCALC 57.6 (L) 02/16/2016         Review of Systems    Objective:      Physical Exam    Assessment:       1. Diabetic peripheral neuropathy associated with type 2 diabetes mellitus    2. Stage 3 chronic kidney disease due to type 2 diabetes mellitus    3. Wheelchair dependent    4. Morbid obesity with BMI of 40.0-44.9, adult    5. Diabetic retinopathy of right eye with macular edema associated with diabetes mellitus due to underlying " condition, unspecified retinopathy severity        Plan:       Sherin was seen today for follow-up.    Diagnoses and all orders for this visit:    Diabetic peripheral neuropathy associated with type 2 diabetes mellitus: This likely that the patient's diabetes is not controlled and at this time she declines any additional medications.  -     CBC auto differential; Future  -     Comprehensive metabolic panel; Future  -     Lipid panel; Future  -     Hemoglobin A1c; Future    Stage 3 chronic kidney disease due to type 2 diabetes mellitus labs ordered  -     Comprehensive metabolic panel; Future    Wheelchair dependent patient continues with gait abnormality    Morbid obesity with BMI of 40.0-44.9, adult    Diabetic retinopathy of right eye with macular edema associated with diabetes mellitus due to underlying condition, unspecified retinopathy severity we reviewed her chart and patient does have diabetic retinopathy and should follow-up with Dr. Edmondson  -     Ambulatory Referral to Ophthalmology    Other orders  -     glimepiride (AMARYL) 1 MG tablet; Take 2 tablets (2 mg total) by mouth with lunch.      Return in about 3 months (around 6/8/2017) for Follow up.    New Prescriptions    No medications on file       Modified Medications    Modified Medication Previous Medication    GLIMEPIRIDE (AMARYL) 1 MG TABLET glimepiride (AMARYL) 1 MG tablet       Take 2 tablets (2 mg total) by mouth with lunch.    Take 2 tablets (2 mg total) by mouth with lunch.       Orders Placed This Encounter   Procedures    CBC auto differential     Standing Status:   Future     Number of Occurrences:   1     Standing Expiration Date:   5/7/2017    Comprehensive metabolic panel     Standing Status:   Future     Number of Occurrences:   1     Standing Expiration Date:   5/7/2017    Lipid panel     Standing Status:   Future     Number of Occurrences:   1     Standing Expiration Date:   5/7/2017    Hemoglobin A1c     Standing Status:   Future      Number of Occurrences:   1     Standing Expiration Date:   5/7/2017    Ambulatory Referral to Ophthalmology     Referral Priority:   Routine     Referral Type:   Consultation     Referral Reason:   Specialty Services Required     Requested Specialty:   Ophthalmology     Number of Visits Requested:   1       Labs, studies and consults associated with this visit were reviewed

## 2017-03-08 NOTE — MR AVS SNAPSHOT
[facility]  [address]  [facility] [address]  Phone: [phone]  Fax: [phone]                  Sherin Ortiz   3/8/2017 3:30 PM   Office Visit    Description:  Female : 1945   Provider:  Ame Vasquez MD   Department:  [facility]           Reason for Visit     Follow-up           Diagnoses this Visit        Comments    Diabetic peripheral neuropathy associated with type 2 diabetes mellitus    -  Primary     Stage 3 chronic kidney disease due to type 2 diabetes mellitus         Wheelchair dependent         Morbid obesity with BMI of 40.0-44.9, adult         Diabetic retinopathy of right eye with macular edema associated with diabetes mellitus due to underlying condition, unspecified retinopathy severity                To Do List           Future Appointments        Provider Department Dept Phone    3/8/2017 4:50 PM LAB, APPOINTMENT [facility] [phone]    3/16/2017 2:20 PM Akosua Alvarenga NP [facility] - Wound Care [phone]      Goals (5 Years of Data)     None      Follow-Up and Disposition     Return in about 3 months (around 2017) for Follow up.       These Medications        Disp Refills Start End    glimepiride (AMARYL) 1 MG tablet 180 tablet 3 3/8/2017     Take 2 tablets (2 mg total) by mouth with lunch. - Oral    Pharmacy: [facility] [address] Ph #: [phone]         [facility]     [facility] -  Assistance  Registered nurses in the [facility] Center provide clinical advisement, health education, appointment booking, and other advisory services.  Call for this free service at [phone].             Medications           Message regarding Medications     Verify the changes and/or additions to your medication regime listed below are the same as discussed with your clinician today.  If any of these changes  "or additions are incorrect, please notify your healthcare provider.             Verify that the below list of medications is an accurate representation of the medications you are currently taking.  If none reported, the list may be blank. If incorrect, please contact your healthcare provider. Carry this list with you in case of emergency.           Current Medications     ACCU-CHEK RAMIRO PLUS METER Misc     ACCU-CHEK RAMIRO PLUS TEST STRP Strp TEST TWICE DAILY    ACCU-CHEK SOFTCLIX LANCETS Misc     aspirin 81 MG Chew Take 81 mg by mouth once daily.      atorvastatin (LIPITOR) 40 MG tablet TAKE 1 TABLET EVERY EVENING    BD INSULIN SYRINGE ULTRA-FINE 1/2 mL 30 x 1/2" Syrg USE EVERY NIGHT    clopidogrel (PLAVIX) 75 mg tablet TAKE 1 TABLET ONE TIME DAILY    econazole nitrate 1 % cream Apply 1 application topically once daily. Apply to affected area    glimepiride (AMARYL) 1 MG tablet Take 2 tablets (2 mg total) by mouth with lunch.    LANTUS 100 unit/mL injection INJECT 7 TO 10 UNITS SUBCUTANEOUSLY IN THE EVENING DEPENDING ON SCALE (DISCARD EACH VIAL AFTER 28 DAYS)    lisinopril 10 MG tablet TAKE 1 TABLET ONE TIME DAILY           Clinical Reference Information           Your Vitals Were     BP Pulse Height Last Period SpO2       128/78 66 5' 6" (1.676 m) (LMP Unknown) 98%       Blood Pressure          Most Recent Value    BP  128/78      Allergies as of 3/8/2017     Penicillins    Sulfa (Sulfonamide Antibiotics)      Immunizations Administered on Date of Encounter - 3/8/2017     None      Orders Placed During Today's Visit      Normal Orders This Visit    Ambulatory Referral to Ophthalmology     Future Labs/Procedures Expected by Expires    CBC auto differential  3/8/2017 5/7/2017    Comprehensive metabolic panel  3/8/2017 (Approximate) 5/7/2017    Hemoglobin A1c  3/8/2017 (Approximate) 5/7/2017    Lipid panel  3/8/2017 (Approximate) 5/7/2017      [facility] Sign-Up     Activating your [facility] account is as easy as " 1-2-3!     1) Visit [email or web address], select Sign Up Now, enter this activation code and your date of birth, then select Next.  [email or web address]  Expires: 3/26/2017  1:55 PM      2) Create a username and password to use when you visit [facility] in the future and select a security question in case you lose your password and select Next.    3) Enter your e-mail address and click Sign Up!    Additional Information  If you have questions, please e-mail [email or web address] or call [phone] to talk to our [employer] staff. Remember, [facility] is NOT to be used for urgent needs. For medical emergencies, dial 911.         Language Assistance Services     ATTENTION: Language assistance services are available, free of charge. Please call [phone].      ATENCIÓN: Si habla español, tiene a hutchins disposición servicios gratuitos de asistencia lingüística. Llame al [phone].     CHÚ Ý: N?u b?n nói Ti?ng Vi?t, có các d?ch v? h? tr? ngôn ng? mi?n phí dành cho b?n. G?i s? [phone].         [facility] complies with applicable Federal civil rights laws and does not discriminate on the basis of race, color, national origin, age, disability, or sex.

## 2017-03-12 ENCOUNTER — TELEPHONE (OUTPATIENT)
Dept: INTERNAL MEDICINE | Facility: CLINIC | Age: 72
End: 2017-03-12

## 2017-03-12 DIAGNOSIS — N18.30 STAGE 3 CHRONIC KIDNEY DISEASE DUE TO TYPE 2 DIABETES MELLITUS: Primary | Chronic | ICD-10-CM

## 2017-03-12 DIAGNOSIS — E87.5 SERUM POTASSIUM ELEVATED: ICD-10-CM

## 2017-03-12 DIAGNOSIS — E11.22 STAGE 3 CHRONIC KIDNEY DISEASE DUE TO TYPE 2 DIABETES MELLITUS: Primary | Chronic | ICD-10-CM

## 2017-03-12 NOTE — TELEPHONE ENCOUNTER
Patient has an appt on 3/16 at the [facility].    On that day: please have her recheck the BMP because her potassium was elevated and likely related to her continued decline in kidney function.    Recheck BMP  Send her the high potassium food list and I want her to decrease the potassium containing foods.  She needs a Nephrology consult for her kidneys  Please tell her that it will help her kidneys if her diabetes is better controlled    This patient also needs an appt with Dr. Pereyra in Opthamology

## 2017-03-13 DIAGNOSIS — N18.9 CHRONIC KIDNEY DISEASE, UNSPECIFIED STAGE: ICD-10-CM

## 2017-03-16 ENCOUNTER — OFFICE VISIT (OUTPATIENT)
Dept: WOUND CARE | Facility: CLINIC | Age: 72
End: 2017-03-16
Payer: MEDICARE

## 2017-03-16 VITALS — TEMPERATURE: 98 F | SYSTOLIC BLOOD PRESSURE: 141 MMHG | DIASTOLIC BLOOD PRESSURE: 58 MMHG | HEART RATE: 71 BPM

## 2017-03-16 DIAGNOSIS — B35.3 TINEA PEDIS OF BOTH FEET: Chronic | ICD-10-CM

## 2017-03-16 DIAGNOSIS — I83.019 VENOUS ULCER OF RIGHT LOWER EXTREMITY WITH VARICOSE VEINS: ICD-10-CM

## 2017-03-16 DIAGNOSIS — L97.919 VENOUS ULCER OF RIGHT LOWER EXTREMITY WITH VARICOSE VEINS: ICD-10-CM

## 2017-03-16 DIAGNOSIS — R60.0 BILATERAL LEG EDEMA: ICD-10-CM

## 2017-03-16 DIAGNOSIS — I87.2 VENOUS INSUFFICIENCY OF BOTH LOWER EXTREMITIES: ICD-10-CM

## 2017-03-16 DIAGNOSIS — L97.922 ULCER OF LEFT LOWER EXTREMITY WITH FAT LAYER EXPOSED: ICD-10-CM

## 2017-03-16 DIAGNOSIS — I87.2 VENOUS STASIS DERMATITIS OF BOTH LOWER EXTREMITIES: ICD-10-CM

## 2017-03-16 DIAGNOSIS — L97.512 ULCER OF TOE, RIGHT, WITH FAT LAYER EXPOSED: ICD-10-CM

## 2017-03-16 DIAGNOSIS — L97.912 ULCER OF LOWER LIMB, RIGHT, WITH FAT LAYER EXPOSED: ICD-10-CM

## 2017-03-16 PROCEDURE — 99499 UNLISTED E&M SERVICE: CPT | Mod: S$GLB,,, | Performed by: NURSE PRACTITIONER

## 2017-03-16 PROCEDURE — 99999 PR PBB SHADOW E&M-EST. PATIENT-LVL IV: CPT | Mod: PBBFAC,,, | Performed by: NURSE PRACTITIONER

## 2017-03-16 PROCEDURE — 29580 STRAPPING UNNA BOOT: CPT | Mod: 50,S$GLB,, | Performed by: NURSE PRACTITIONER

## 2017-03-16 NOTE — MR AVS SNAPSHOT
[facility]  [address]  [facility] [address]  Phone: [phone]                  Sherin Ortiz   3/16/2017 2:20 PM   Office Visit    Description:  Female : 1945   Provider:  Akosua Alvarenga NP   Department:  [facility]           Reason for Visit     Wound Check           Diagnoses this Visit        Comments    Uncontrolled type 2 diabetes mellitus with complication, with long-term current use of insulin    -  Primary     Ulcer of left lower extremity with fat layer exposed         Ulcer of lower limb, right, with fat layer exposed         Ulcer of toe, right, with fat layer exposed         Venous ulcer of right lower extremity with varicose veins         Tinea pedis of both feet         Venous stasis dermatitis of both lower extremities         Bilateral leg edema         Venous insufficiency of both lower extremities                To Do List           Goals (5 Years of Data)     None      Follow-Up and Disposition     Return in about 2 weeks (around 3/30/2017) for Wound evaluation.      [facility]     [facility] On Call Nurse Care Line -  Assistance  Registered nurses in the [facility] provide clinical advisement, health education, appointment booking, and other advisory services.  Call for this free service at [phone].             Medications           Message regarding Medications     Verify the changes and/or additions to your medication regime listed below are the same as discussed with your clinician today.  If any of these changes or additions are incorrect, please notify your healthcare provider.             Verify that the below list of medications is an accurate representation of the medications you are currently taking.  If none reported, the list may be blank. If incorrect, please contact your healthcare provider. Carry this list with you in case of emergency.           Current Medications     ACCU-[place] [facility] [place]      "ACCU-CHEK RAMIRO PLUS TEST STRP Strp TEST TWICE DAILY    ACCU-CHEK SOFTCLIX LANCETS Misc     aspirin 81 MG Chew Take 81 mg by mouth once daily.      atorvastatin (LIPITOR) 40 MG tablet TAKE 1 TABLET EVERY EVENING    BD INSULIN SYRINGE ULTRA-FINE 1/2 mL 30 x 1/2" Syrg USE EVERY NIGHT    clopidogrel (PLAVIX) 75 mg tablet TAKE 1 TABLET ONE TIME DAILY    econazole nitrate 1 % cream Apply 1 application topically once daily. Apply to affected area    glimepiride (AMARYL) 1 MG tablet Take 2 tablets (2 mg total) by mouth with lunch.    LANTUS 100 unit/mL injection INJECT 7 TO 10 UNITS SUBCUTANEOUSLY IN THE EVENING DEPENDING ON SCALE (DISCARD EACH VIAL AFTER 28 DAYS)    lisinopril 10 MG tablet TAKE 1 TABLET ONE TIME DAILY           Clinical Reference Information           Your Vitals Were     Last Period                   (LMP Unknown)           Allergies as of 3/16/2017     Penicillins    Sulfa (Sulfonamide Antibiotics)      Immunizations Administered on Date of Encounter - 3/16/2017     None      [facility] Sign-Up     Activating your [facility] account is as easy as 1-2-3!     1) Visit [email or web address], select Sign Up Now, enter this activation code and your date of birth, then select Next.  [email or web address]  Expires: 3/26/2017  2:55 PM      2) Create a username and password to use when you visit [facility] in the future and select a security question in case you lose your password and select Next.    3) Enter your e-mail address and click Sign Up!    Additional Information  If you have questions, please e-mail [email or web address] or call [phone] to talk to our [facility] staff. Remember, [facility] is NOT to be used for urgent needs. For medical emergencies, dial 911.         Instructions    Elevate legs as much as possible. Do not get the dressings wet and use cast covers for showering.  Should the dressing become wet, remove it, place a wet-to-dry dressing over the wound, cover with gauze and roll gauze and use ace " wraps for compression and to secure bandages.  Notify home health as soon as possible to have a new dressing applied.         Language Assistance Services     ATTENTION: Language assistance services are available, free of charge. Please call [phone].      ATENCIÓN: Si ruth guzman, tiene a hutchins disposición servicios gratuitos de asistencia lingüística. Llame al [phone].     CHÚ Ý: N?u b?n nói Ti?ng Vi?t, có các d?ch v? h? tr? ngôn ng? mi?n phí dành cho b?n. G?i s? [phone].         [facility] complies with applicable Federal civil rights laws and does not discriminate on the basis of race, color, national origin, age, disability, or sex.

## 2017-03-16 NOTE — PROGRESS NOTES
Subjective:       Patient ID: Sherin Ortiz is a 72 y.o. female.    Chief Complaint: Wound Check    Wound Check     Wound Check:   This patient is seen today for reevaluation of wounds to bilateral lower legs and the right second toe. An unna boot was on the legs but she removed it before coming to clinic today to shower.  The wounds are healing slowly as evidenced by wound contracture.   Problems that interfere with wound healing include multiple co-morbidities, diabetes, edema, diabetic neuropathy and  morbid obesity. The patient is afebrile. The patient ambulates with great difficulty secondary to her body habitus.  She has no pain.  She denies increased swelling, redness or purulent drainage.   She is afebrile.   Review of Systems   Constitutional: Negative for chills, diaphoresis and fever.   HENT: Negative for hearing loss, postnasal drip, rhinorrhea, sinus pressure, sneezing, sore throat, tinnitus and trouble swallowing.    Eyes: Negative for visual disturbance.   Respiratory: Negative for apnea, cough, shortness of breath and wheezing.    Cardiovascular: Positive for leg swelling. Negative for chest pain and palpitations.   Gastrointestinal: Negative for constipation, diarrhea, nausea and vomiting.   Genitourinary: Negative for difficulty urinating, dysuria, frequency and hematuria.   Musculoskeletal: Negative for arthralgias, back pain and joint swelling.   Skin: Positive for wound.   Neurological: Negative for dizziness, weakness, light-headedness and headaches.   Hematological: Does not bruise/bleed easily.   Psychiatric/Behavioral: Negative for confusion, decreased concentration, dysphoric mood and sleep disturbance. The patient is not nervous/anxious.    All other systems reviewed and are negative.      Objective:      Physical Exam   Constitutional: She is oriented to person, place, and time. No distress.   Morbidly obese   HENT:   Head: Normocephalic and atraumatic.   Neck: Normal range of motion.  Neck supple.   Pulmonary/Chest: Effort normal.   Musculoskeletal: Normal range of motion. She exhibits edema. She exhibits no tenderness.        Legs:       Feet:    Neurological: She is alert and oriented to person, place, and time.   Skin: Skin is warm and dry. Rash (dermatitis and tinea bilateral lower extremities) noted. She is not diaphoretic. No cyanosis or erythema. No pallor. Nails show no clubbing.   Psychiatric: She has a normal mood and affect. Her behavior is normal. Judgment and thought content normal.   Nursing note and vitals reviewed.    ..  Hemoglobin A1C   Date Value Ref Range Status   03/08/2017 9.6 (H) 4.5 - 6.2 % Final     Comment:     According to ADA guidelines, hemoglobin A1C <7.0% represents  optimal control in non-pregnant diabetic patients.  Different  metrics may apply to specific populations.   Standards of Medical Care in Diabetes - 2016.  For the purpose of screening for the presence of diabetes:  <5.7%     Consistent with the absence of diabetes  5.7-6.4%  Consistent with increasing risk for diabetes   (prediabetes)  >or=6.5%  Consistent with diabetes  Currently no consensus exists for use of hemoglobin A1C  for diagnosis of diabetes for children.     11/08/2016 9.9 (H) 4.5 - 6.2 % Final     Comment:     According to ADA guidelines, hemoglobin A1C <7.0% represents  optimal control in non-pregnant diabetic patients.  Different  metrics may apply to specific populations.   Standards of Medical Care in Diabetes - 2016.  For the purpose of screening for the presence of diabetes:  <5.7%     Consistent with the absence of diabetes  5.7-6.4%  Consistent with increasing risk for diabetes   (prediabetes)  >or=6.5%  Consistent with diabetes  Currently no consensus exists for use of hemoglobin A1C  for diagnosis of diabetes for children.     05/26/2016 9.1 (H) 4.5 - 6.2 % Final     ..  Lab Results   Component Value Date    ALBUMIN 3.0 (L) 03/08/2017     Sherin was seen in the clinic room and  placed in the supine position on the treatment table.  Both lower legs were cleansed with Easi-clense sponges and dried thoroughly.  Medihoney gel and a hydrofiber dressing was applied to the right leg wound.  A mepilex lite foam dressing was applied to the left leg ulcer.  Eucerin cream was applied to the lower legs.  The ankle was padded with ABD pads.  The patient's feet was positioned at a 90 degree angle.  A zinc oxide wrap, followed by kerlix roll gauze and coban were applied using a spiral technique avoiding creases or folds.  The wraps were started behind the first metatarsal and ended below the tibial tubercle of the knee.  There was overlap of each turn half the width of the previous turn.  The compression wraps will be changed every 3-4 days.      Assessment:       1. Uncontrolled type 2 diabetes mellitus with complication, with long-term current use of insulin    2. Ulcer of left lower extremity with fat layer exposed    3. Ulcer of lower limb, right, with fat layer exposed    4. Ulcer of toe, right, with fat layer exposed    5. Venous ulcer of right lower extremity with varicose veins    6. Tinea pedis of both feet    7. Venous stasis dermatitis of both lower extremities    8. Bilateral leg edema        Plan:           Spectazole cream to feet and toes.  Triamcinolone cream to lower legs.  Unna boot bilateral lower legs as detailed above.  Patient was warned not to get the dressings wet and to use cast covers for showering.  Should the dressing become wet, she is to remove it, place a wet-to-dry dressing over the wound, cover with gauze and roll gauze and use ace wraps for compression and to secure bandages.  She should then notify home health as soon as possible to have a new dressing applied.  Apply medihoney gel and hydrofiber dressing to right second ulcer, cotton in between toes, cover with gauze and secure with roll gauze.  Change dressing twice weekly.  Return to clinic in 2 weeks.  [facility]  notified of orders via email.      Home Health Wound Care Orders.  Cleanse wounds with wound .  Spectazole cream to feet and toes daily.  Triamcinolone cream to lower legs.  Medihoney gel and hydrofiber dressing to right leg wound.  Mepilex foam dressing to left leg wound.  ABD pad to both ankles and right shin.  Unna boot (zinc oxide, kerlix and coban) bilateral lower legs.  Medihoney gel and hydrofiber to right second toe ulcer, cotton in between toes, cover right foot wound with gauze and secure with roll gauze.  Change toe dressing twice weekly.  Change unna boots twice weekly.    Right shin      Left lateral leg      Right second toe

## 2017-03-17 NOTE — TELEPHONE ENCOUNTER
Left message on machine to have patient return call to clinic. Mailed out high potassium list to patient.

## 2017-03-27 RX ORDER — PEN NEEDLE, DIABETIC 29 G X1/2"
NEEDLE, DISPOSABLE MISCELLANEOUS
Qty: 90 EACH | Refills: 3 | Status: SHIPPED | OUTPATIENT
Start: 2017-03-27 | End: 2018-05-11 | Stop reason: SDUPTHER

## 2017-03-30 ENCOUNTER — OFFICE VISIT (OUTPATIENT)
Dept: WOUND CARE | Facility: CLINIC | Age: 72
End: 2017-03-30
Payer: MEDICARE

## 2017-03-30 VITALS
TEMPERATURE: 98 F | HEIGHT: 66 IN | SYSTOLIC BLOOD PRESSURE: 147 MMHG | DIASTOLIC BLOOD PRESSURE: 64 MMHG | HEART RATE: 57 BPM

## 2017-03-30 DIAGNOSIS — L97.512 ULCER OF TOE, RIGHT, WITH FAT LAYER EXPOSED: ICD-10-CM

## 2017-03-30 DIAGNOSIS — L97.919 VENOUS ULCER OF RIGHT LOWER EXTREMITY WITH VARICOSE VEINS: ICD-10-CM

## 2017-03-30 DIAGNOSIS — L97.912 ULCER OF LOWER LIMB, RIGHT, WITH FAT LAYER EXPOSED: ICD-10-CM

## 2017-03-30 DIAGNOSIS — E88.09 HYPOALBUMINEMIA: ICD-10-CM

## 2017-03-30 DIAGNOSIS — L97.922 ULCER OF LEFT LOWER EXTREMITY WITH FAT LAYER EXPOSED: ICD-10-CM

## 2017-03-30 DIAGNOSIS — I87.2 VENOUS INSUFFICIENCY OF BOTH LOWER EXTREMITIES: ICD-10-CM

## 2017-03-30 DIAGNOSIS — I87.2 VENOUS STASIS DERMATITIS OF BOTH LOWER EXTREMITIES: ICD-10-CM

## 2017-03-30 DIAGNOSIS — I83.019 VENOUS ULCER OF RIGHT LOWER EXTREMITY WITH VARICOSE VEINS: ICD-10-CM

## 2017-03-30 DIAGNOSIS — B35.3 TINEA PEDIS OF BOTH FEET: Chronic | ICD-10-CM

## 2017-03-30 DIAGNOSIS — R60.0 BILATERAL LEG EDEMA: ICD-10-CM

## 2017-03-30 PROCEDURE — 99999 PR PBB SHADOW E&M-EST. PATIENT-LVL IV: CPT | Mod: PBBFAC,,, | Performed by: NURSE PRACTITIONER

## 2017-03-30 PROCEDURE — 29580 STRAPPING UNNA BOOT: CPT | Mod: 50,S$GLB,, | Performed by: NURSE PRACTITIONER

## 2017-03-30 PROCEDURE — 99499 UNLISTED E&M SERVICE: CPT | Mod: S$GLB,,, | Performed by: NURSE PRACTITIONER

## 2017-03-30 NOTE — MR AVS SNAPSHOT
[facility]  [address]  [facility] [address]  Phone: [phone]                  Sherin Ortiz   3/30/2017 2:20 PM   Office Visit    Description:  Female : 1945   Provider:  Akosua Alvarenga NP   Department:  [facility]           Reason for Visit     Wound Check           Diagnoses this Visit        Comments    Uncontrolled type 2 diabetes mellitus with complication, with long-term current use of insulin    -  Primary     Ulcer of left lower extremity with fat layer exposed         Ulcer of lower limb, right, with fat layer exposed         Ulcer of toe, right, with fat layer exposed         Venous ulcer of right lower extremity with varicose veins         Venous insufficiency of both lower extremities         Tinea pedis of both feet         Venous stasis dermatitis of both lower extremities         Hypoalbuminemia         Bilateral leg edema                To Do List           Future Appointments        Provider Department Dept Phone    2017 3:00 PM MD [facility] - Nephrology [phone]    2017 2:20 PM [facility] [phone]      Goals (5 Years of Data)     None      Follow-Up and Disposition     Return in about 2 weeks (around 2017) for Wound evaluation.      [facility] On Call     [facility] On Call Nurse Care Line -  Assistance  Unless otherwise directed by your provider, please contact [facility], our nurse care line that is available for  assistance.     Registered nurses in the [facility] On Call Center provide: appointment scheduling, clinical advisement, health education, and other advisory services.  Call: [phone] (toll free)               Medications           Message regarding Medications     Verify the changes and/or additions to your medication regime listed below are the same as discussed with your clinician today.  If any of these changes or additions are incorrect, please notify  "your healthcare provider.             Verify that the below list of medications is an accurate representation of the medications you are currently taking.  If none reported, the list may be blank. If incorrect, please contact your healthcare provider. Carry this list with you in case of emergency.           Current Medications     ACCU-CHEK RAMIRO PLUS METER Misc     ACCU-CHEK RAMIRO PLUS TEST STRP Strp TEST TWICE DAILY    ACCU-CHEK SOFTCLIX LANCETS Misc     aspirin 81 MG Chew Take 81 mg by mouth once daily.      atorvastatin (LIPITOR) 40 MG tablet TAKE 1 TABLET EVERY EVENING    BD INSULIN SYRINGE ULTRA-FINE 1/2 mL 30 gauge x 1/2" Syrg USE EVERY NIGHT    clopidogrel (PLAVIX) 75 mg tablet TAKE 1 TABLET ONE TIME DAILY    econazole nitrate 1 % cream Apply 1 application topically once daily. Apply to affected area    glimepiride (AMARYL) 1 MG tablet Take 2 tablets (2 mg total) by mouth with lunch.    LANTUS 100 unit/mL injection INJECT 7 TO 10 UNITS SUBCUTANEOUSLY IN THE EVENING DEPENDING ON SCALE (DISCARD EACH VIAL AFTER 28 DAYS)    lisinopril 10 MG tablet TAKE 1 TABLET ONE TIME DAILY           Clinical Reference Information           Your Vitals Were     BP Pulse Temp Height Last Period       147/64 57 97.7 °F (36.5 °C) 5' 6" (1.676 m) (LMP Unknown)       Blood Pressure          Most Recent Value    BP  (!)  147/64      Allergies as of 3/30/2017     Penicillins    Sulfa (Sulfonamide Antibiotics)      Immunizations Administered on Date of Encounter - 3/30/2017     None      [facility] Sign-Up     Activating your [facility] account is as easy as 1-2-3!     1) Visit [email or web address], select Sign Up Now, enter this activation code and your date of birth, then select Next.  [email or web address]  Expires: 5/14/2017  2:38 PM      2) Create a username and password to use when you visit [facility] in the future and select a security question in case you lose your password and select Next.    3) Enter your e-mail address and click Sign " Up!    Additional Information  If you have questions, please e-mail [email or web address] or call [phone] to talk to our [facility] staff. Remember, [facility] is NOT to be used for urgent needs. For medical emergencies, dial 911.         Instructions    Elevate legs as much as possible. Do not get the dressings wet and use cast covers for showering.  Should the dressing become wet, remove it, place a wet-to-dry dressing over the wound, cover with gauze and roll gauze and use ace wraps for compression and to secure bandages.  Notify home health as soon as possible to have a new dressing applied.    Keep your dressing dry.  On the day home health is coming to change your dressing, you may shower with a mild soap such as Dove.  Irrigate the wound with lukewarm water for 5 minutes, then dry thoroughly.  Place a dry bandage over the wound to cover it until the nurse arrives.         Language Assistance Services     ATTENTION: Language assistance services are available, free of charge. Please call [phone].      ATENCIÓN: Si habla español, tiene a hutchins disposición servicios gratuitos de asistencia lingüística. Llame al [phone].     MELINA Ý: N?u b?n nói Ti?ng Vi?t, có các d?ch v? h? tr? ngôn ng? mi?n phí dành cho b?n. G?i s? [phone].         [facility] complies with applicable Federal civil rights laws and does not discriminate on the basis of race, color, national origin, age, disability, or sex.

## 2017-03-30 NOTE — PATIENT INSTRUCTIONS
Elevate legs as much as possible. Do not get the dressings wet and use cast covers for showering.  Should the dressing become wet, remove it, place a wet-to-dry dressing over the wound, cover with gauze and roll gauze and use ace wraps for compression and to secure bandages.  Notify home health as soon as possible to have a new dressing applied.    Keep your dressing dry.  On the day home health is coming to change your dressing, you may shower with a mild soap such as Dove.  Irrigate the wound with lukewarm water for 5 minutes, then dry thoroughly.  Place a dry bandage over the wound to cover it until the nurse arrives.

## 2017-03-30 NOTE — PROGRESS NOTES
Subjective:       Patient ID: Sherin Ortiz is a 72 y.o. female.    Chief Complaint: Wound Check    Wound Check     Wound Check:   This patient is seen today for reevaluation of wounds to bilateral lower legs and the right second toe. An unna boot was on the legs but she removed it before coming to clinic today to shower.  The wounds are healing slowly as evidenced by wound contracture.   Problems that interfere with wound healing include multiple co-morbidities, diabetes, edema, diabetic neuropathy and  morbid obesity. The patient is afebrile. The patient ambulates with great difficulty secondary to her body habitus.  She has no pain.  She denies increased swelling, redness or purulent drainage.   She is afebrile.   Review of Systems   Constitutional: Negative for chills, diaphoresis and fever.   HENT: Negative for hearing loss, postnasal drip, rhinorrhea, sinus pressure, sneezing, sore throat, tinnitus and trouble swallowing.    Eyes: Negative for visual disturbance.   Respiratory: Negative for apnea, cough, shortness of breath and wheezing.    Cardiovascular: Positive for leg swelling. Negative for chest pain and palpitations.   Gastrointestinal: Negative for constipation, diarrhea, nausea and vomiting.   Genitourinary: Negative for difficulty urinating, dysuria, frequency and hematuria.   Musculoskeletal: Negative for arthralgias, back pain and joint swelling.   Skin: Positive for wound.   Neurological: Negative for dizziness, weakness, light-headedness and headaches.   Hematological: Does not bruise/bleed easily.   Psychiatric/Behavioral: Negative for confusion, decreased concentration, dysphoric mood and sleep disturbance. The patient is not nervous/anxious.    All other systems reviewed and are negative.      Objective:      Physical Exam   Constitutional: She is oriented to person, place, and time. No distress.   Morbidly obese   HENT:   Head: Normocephalic and atraumatic.   Neck: Normal range of motion.  Neck supple.   Pulmonary/Chest: Effort normal.   Musculoskeletal: Normal range of motion. She exhibits edema. She exhibits no tenderness.        Legs:       Feet:    Neurological: She is alert and oriented to person, place, and time.   Skin: Skin is warm and dry. Rash (dermatitis and tinea bilateral lower extremities) noted. She is not diaphoretic. No cyanosis or erythema. No pallor. Nails show no clubbing.   Psychiatric: She has a normal mood and affect. Her behavior is normal. Judgment and thought content normal.   Nursing note and vitals reviewed.    ..  Hemoglobin A1C   Date Value Ref Range Status   03/08/2017 9.6 (H) 4.5 - 6.2 % Final     Comment:     According to ADA guidelines, hemoglobin A1C <7.0% represents  optimal control in non-pregnant diabetic patients.  Different  metrics may apply to specific populations.   Standards of Medical Care in Diabetes - 2016.  For the purpose of screening for the presence of diabetes:  <5.7%     Consistent with the absence of diabetes  5.7-6.4%  Consistent with increasing risk for diabetes   (prediabetes)  >or=6.5%  Consistent with diabetes  Currently no consensus exists for use of hemoglobin A1C  for diagnosis of diabetes for children.     11/08/2016 9.9 (H) 4.5 - 6.2 % Final     Comment:     According to ADA guidelines, hemoglobin A1C <7.0% represents  optimal control in non-pregnant diabetic patients.  Different  metrics may apply to specific populations.   Standards of Medical Care in Diabetes - 2016.  For the purpose of screening for the presence of diabetes:  <5.7%     Consistent with the absence of diabetes  5.7-6.4%  Consistent with increasing risk for diabetes   (prediabetes)  >or=6.5%  Consistent with diabetes  Currently no consensus exists for use of hemoglobin A1C  for diagnosis of diabetes for children.     05/26/2016 9.1 (H) 4.5 - 6.2 % Final     ..  Lab Results   Component Value Date    ALBUMIN 3.0 (L) 03/08/2017     Sherin was seen in the clinic room and  placed in the supine position on the treatment table.  Both lower legs were cleansed with Easi-clense sponges and dried thoroughly.  Medihoney gel and a hydrofiber dressing was applied to the bilateral leg wounds.  Eucerin cream was applied to the lower legs.  The ankle was padded with ABD pads.  The patient's feet was positioned at a 90 degree angle.  A zinc oxide wrap, followed by kerlix roll gauze and coban were applied using a spiral technique avoiding creases or folds.  The wraps were started behind the first metatarsal and ended below the tibial tubercle of the knee.  There was overlap of each turn half the width of the previous turn.  The compression wraps will be changed every 3-4 days.      Assessment:       1. Uncontrolled type 2 diabetes mellitus with complication, with long-term current use of insulin    2. Ulcer of left lower extremity with fat layer exposed    3. Ulcer of lower limb, right, with fat layer exposed    4. Ulcer of toe, right, with fat layer exposed    5. Venous ulcer of right lower extremity with varicose veins    6. Venous insufficiency of both lower extremities    7. Tinea pedis of both feet    8. Venous stasis dermatitis of both lower extremities    9. Hypoalbuminemia    10. Bilateral leg edema        Plan:           Spectazole cream to feet and toes.  Triamcinolone cream to lower legs.  Unna boot bilateral lower legs as detailed above.  Patient was warned not to get the dressings wet and to use cast covers for showering.  Should the dressing become wet, she is to remove it, place a wet-to-dry dressing over the wound, cover with gauze and roll gauze and use ace wraps for compression and to secure bandages.  She should then notify home health as soon as possible to have a new dressing applied.  Apply medihoney gel and hydrofiber dressing to right second ulcer, cotton in between toes, cover with gauze and secure with roll gauze.  Change dressing twice weekly.  Return to clinic in 2  weeks.  [facility] notified of orders via email.      Home Health Wound Care Orders.  Cleanse wounds with wound .  Spectazole cream to feet and toes daily.  Triamcinolone cream to lower legs.  Medihoney gel and hydrofiber dressing to bilateral leg wounds.  ABD pad to both ankles and right shin.  Unna boot (zinc oxide, kerlix and coban) bilateral lower legs.  Medihoney gel and hydrofiber to right second toe ulcer, cotton in between toes, cover right foot wound with gauze and secure with roll gauze.  Change toe dressing twice weekly.  Change unna boots twice weekly.    Right shin      Left lateral leg      Right second toe

## 2017-03-31 ENCOUNTER — TELEPHONE (OUTPATIENT)
Dept: INTERNAL MEDICINE | Facility: CLINIC | Age: 72
End: 2017-03-31

## 2017-04-12 ENCOUNTER — OFFICE VISIT (OUTPATIENT)
Dept: NEPHROLOGY | Facility: CLINIC | Age: 72
End: 2017-04-12
Payer: MEDICARE

## 2017-04-12 ENCOUNTER — LAB VISIT (OUTPATIENT)
Dept: LAB | Facility: HOSPITAL | Age: 72
End: 2017-04-12
Attending: INTERNAL MEDICINE
Payer: MEDICARE

## 2017-04-12 VITALS
SYSTOLIC BLOOD PRESSURE: 154 MMHG | DIASTOLIC BLOOD PRESSURE: 82 MMHG | BODY MASS INDEX: 47.09 KG/M2 | OXYGEN SATURATION: 98 % | HEIGHT: 66 IN | HEART RATE: 64 BPM | WEIGHT: 293 LBS

## 2017-04-12 DIAGNOSIS — I10 BENIGN ESSENTIAL HYPERTENSION: Chronic | ICD-10-CM

## 2017-04-12 DIAGNOSIS — E11.22 STAGE 3 CHRONIC KIDNEY DISEASE DUE TO TYPE 2 DIABETES MELLITUS: Chronic | ICD-10-CM

## 2017-04-12 DIAGNOSIS — Z99.3 WHEELCHAIR DEPENDENT: Chronic | ICD-10-CM

## 2017-04-12 DIAGNOSIS — R60.0 BILATERAL LEG EDEMA: ICD-10-CM

## 2017-04-12 DIAGNOSIS — E78.5 HYPERLIPIDEMIA LDL GOAL <100: Chronic | ICD-10-CM

## 2017-04-12 DIAGNOSIS — I83.019 VENOUS ULCER OF RIGHT LOWER EXTREMITY WITH VARICOSE VEINS: ICD-10-CM

## 2017-04-12 DIAGNOSIS — E21.3 HYPERPARATHYROIDISM: ICD-10-CM

## 2017-04-12 DIAGNOSIS — E11.9 DIABETES MELLITUS WITHOUT COMPLICATION: ICD-10-CM

## 2017-04-12 DIAGNOSIS — N18.30 STAGE 3 CHRONIC KIDNEY DISEASE DUE TO TYPE 2 DIABETES MELLITUS: Primary | Chronic | ICD-10-CM

## 2017-04-12 DIAGNOSIS — N20.0 NEPHROLITHIASIS: ICD-10-CM

## 2017-04-12 DIAGNOSIS — L97.919 VENOUS ULCER OF RIGHT LOWER EXTREMITY WITH VARICOSE VEINS: ICD-10-CM

## 2017-04-12 DIAGNOSIS — N18.30 STAGE 3 CHRONIC KIDNEY DISEASE DUE TO TYPE 2 DIABETES MELLITUS: Chronic | ICD-10-CM

## 2017-04-12 DIAGNOSIS — E11.22 STAGE 3 CHRONIC KIDNEY DISEASE DUE TO TYPE 2 DIABETES MELLITUS: Primary | Chronic | ICD-10-CM

## 2017-04-12 DIAGNOSIS — E88.09 HYPOALBUMINEMIA: ICD-10-CM

## 2017-04-12 DIAGNOSIS — D63.8 ANEMIA OF CHRONIC DISEASE: ICD-10-CM

## 2017-04-12 LAB
BACTERIA #/AREA URNS AUTO: ABNORMAL /HPF
BILIRUB UR QL STRIP: NEGATIVE
CLARITY UR REFRACT.AUTO: ABNORMAL
COLOR UR AUTO: YELLOW
CREAT UR-MCNC: 83 MG/DL
GLUCOSE UR QL STRIP: ABNORMAL
HGB UR QL STRIP: ABNORMAL
KETONES UR QL STRIP: NEGATIVE
LEUKOCYTE ESTERASE UR QL STRIP: ABNORMAL
MICROSCOPIC COMMENT: ABNORMAL
NITRITE UR QL STRIP: NEGATIVE
PH UR STRIP: 5 [PH] (ref 5–8)
PROT UR QL STRIP: NEGATIVE
PROT UR-MCNC: 15 MG/DL
PROT/CREAT RATIO, UR: 0.18
RBC #/AREA URNS AUTO: 2 /HPF (ref 0–4)
SP GR UR STRIP: 1.02 (ref 1–1.03)
SQUAMOUS #/AREA URNS AUTO: 2 /HPF
URN SPEC COLLECT METH UR: ABNORMAL
UROBILINOGEN UR STRIP-ACNC: NEGATIVE EU/DL
WBC #/AREA URNS AUTO: 55 /HPF (ref 0–5)
WBC CLUMPS UR QL AUTO: ABNORMAL
YEAST UR QL AUTO: ABNORMAL

## 2017-04-12 PROCEDURE — 84156 ASSAY OF PROTEIN URINE: CPT

## 2017-04-12 PROCEDURE — 99999 PR PBB SHADOW E&M-EST. PATIENT-LVL III: CPT | Mod: PBBFAC,,, | Performed by: INTERNAL MEDICINE

## 2017-04-12 PROCEDURE — 99499 UNLISTED E&M SERVICE: CPT | Mod: S$GLB,,, | Performed by: INTERNAL MEDICINE

## 2017-04-12 PROCEDURE — 1126F AMNT PAIN NOTED NONE PRSNT: CPT | Mod: S$GLB,,, | Performed by: INTERNAL MEDICINE

## 2017-04-12 PROCEDURE — 1157F ADVNC CARE PLAN IN RCRD: CPT | Mod: S$GLB,,, | Performed by: INTERNAL MEDICINE

## 2017-04-12 PROCEDURE — 3077F SYST BP >= 140 MM HG: CPT | Mod: S$GLB,,, | Performed by: INTERNAL MEDICINE

## 2017-04-12 PROCEDURE — 3066F NEPHROPATHY DOC TX: CPT | Mod: S$GLB,,, | Performed by: INTERNAL MEDICINE

## 2017-04-12 PROCEDURE — 1160F RVW MEDS BY RX/DR IN RCRD: CPT | Mod: S$GLB,,, | Performed by: INTERNAL MEDICINE

## 2017-04-12 PROCEDURE — 99204 OFFICE O/P NEW MOD 45 MIN: CPT | Mod: S$GLB,,, | Performed by: INTERNAL MEDICINE

## 2017-04-12 PROCEDURE — 1159F MED LIST DOCD IN RCRD: CPT | Mod: S$GLB,,, | Performed by: INTERNAL MEDICINE

## 2017-04-12 PROCEDURE — 3079F DIAST BP 80-89 MM HG: CPT | Mod: S$GLB,,, | Performed by: INTERNAL MEDICINE

## 2017-04-12 PROCEDURE — 3046F HEMOGLOBIN A1C LEVEL >9.0%: CPT | Mod: S$GLB,,, | Performed by: INTERNAL MEDICINE

## 2017-04-12 PROCEDURE — 81001 URINALYSIS AUTO W/SCOPE: CPT

## 2017-04-12 RX ORDER — HYDROCHLOROTHIAZIDE 25 MG/1
25 TABLET ORAL DAILY
Qty: 30 TABLET | Refills: 3 | Status: SHIPPED | OUTPATIENT
Start: 2017-04-12 | End: 2017-07-26 | Stop reason: SDUPTHER

## 2017-04-12 RX ORDER — HYDROCHLOROTHIAZIDE 25 MG/1
12.5 TABLET ORAL DAILY
Qty: 30 TABLET | Refills: 3 | Status: SHIPPED | OUTPATIENT
Start: 2017-04-12 | End: 2017-04-12 | Stop reason: SDUPTHER

## 2017-04-12 NOTE — LETTER
April 12, 2017      Ame Vasquez MD  [address]  [facility] [address]           [facility] - Nephrology  [address]  [facility] [address]  Phone: [phone]  Fax: [phone]          Patient: Sherin Ortiz   MR Number: [id number]   YOB: 1945   Date of Visit: 4/12/2017       Dear Dr. Ame Vasquez:    Thank you for referring Sherin Ortiz to me for evaluation. Attached you will find relevant portions of my assessment and plan of care.    If you have questions, please do not hesitate to call me. I look forward to following Sherin Ortiz along with you.    Sincerely,    Cynthia Choi MD    Enclosure  CC:  No Recipients    If you would like to receive this communication electronically, please contact [email or web address] or [phone] to request more information on [employer] access.    For providers and/or their staff who would like to refer a patient to [facility], please contact us through our one-stop-shop provider referral line, [facility], at [phone].    If you feel you have received this communication in error or would no longer like to receive these types of communications, please e-mail [email or web address]

## 2017-04-12 NOTE — PROGRESS NOTES
Subjective:       Patient ID: Sherin Ortiz is a 72 y.o. White female who presents for new evaluation of CKD  The patient has a history of uncontrolled DM x 50 yrs with diabetic neuropathy and proliferative diabetic retinopathy (laser surgery), morbid obesity, HTN, diastolic dysfunction.   The patient has no family history of kidney disease. She also has a hx of kidney stones, she had an episode in January 2017 with right sided hydronephrosis and intervention (passend when stent was placed). The patient does not freuqently use NSAIDS or herbal supplements. Has not been to the eye doctor in a while. She had problems with her balance, right toe ulcer, ulcer right leg.    HPI  Review of Systems   Constitutional: Negative for activity change, appetite change, chills, fatigue, fever and unexpected weight change.   HENT: Negative.  Negative for nosebleeds.    Eyes: Negative.    Respiratory: Negative.  Negative for cough, chest tightness and shortness of breath.    Cardiovascular: Negative.  Negative for chest pain and leg swelling.   Gastrointestinal: Negative for abdominal pain, anal bleeding, diarrhea, nausea and vomiting.   Genitourinary: Negative for decreased urine volume, difficulty urinating, dysuria, flank pain, frequency, hematuria, pelvic pain, urgency and vaginal bleeding.        Nocturia 3-4 times   Musculoskeletal: Negative.  Negative for joint swelling and myalgias.   Skin: Negative.  Negative for rash.   Neurological: Positive for numbness (feet).   Psychiatric/Behavioral: Negative.    All other systems reviewed and are negative.      Objective:      Physical Exam   Constitutional: She is oriented to person, place, and time. She appears well-developed and well-nourished. No distress.   HENT:   Head: Normocephalic and atraumatic.   Right Ear: External ear normal.   Left Ear: External ear normal.   Nose: Nose normal.   Mouth/Throat: Oropharynx is clear and moist.   Eyes: Conjunctivae and EOM are normal.  Pupils are equal, round, and reactive to light.   Neck: Normal range of motion. Neck supple. No thyromegaly present.   Cardiovascular: Normal rate, regular rhythm, normal heart sounds and intact distal pulses.    Pulmonary/Chest: Effort normal and breath sounds normal. No respiratory distress. She has no wheezes. She has no rales. She exhibits no tenderness.   Abdominal: Soft. Normal appearance and bowel sounds are normal. She exhibits no distension. There is no tenderness. There is no rebound and no guarding.   Musculoskeletal: Normal range of motion. She exhibits no edema or tenderness.   Neurological: She is alert and oriented to person, place, and time. She has normal reflexes.   Skin: Skin is warm, dry and intact. She is not diaphoretic.   Psychiatric: She has a normal mood and affect. Her behavior is normal. Judgment and thought content normal.   Nursing note and vitals reviewed.      Assessment:       1. Stage 3 chronic kidney disease due to type 2 diabetes mellitus    2. Nephrolithiasis    3. Anemia of chronic disease    4. Hypoalbuminemia    5. Hyperlipidemia LDL goal <100    6. Venous ulcer of right lower extremity with varicose veins    7. Wheelchair dependent    8. Bilateral leg edema        Plan:       1. CKD3: Most likely secondary to diabetic nephropathy (also has retinopathy and neuropathy.   We will send the patient for a renal US, SPEP/YRIS, UA and protein/creatinine ratio and uric acid level.  - on low dose lisinopril   - on a statin     Advised to follow a low salt and low potassium diet.   - add thiazide  - if recurrent stones will send for stone profile.     Lab Results   Component Value Date    CREATININE 1.7 (H) 03/08/2017     Protein Creatinine Ratios:    Prot/Creat Ratio, Ur   Date Value Ref Range Status   01/18/2014 Unable to calculate 0.0 - 0.2 Final     ·   ·   Acid-Base: hyperkalemia one month ago. Will add thiazide!  Lab Results   Component Value Date     03/08/2017    K 5.9 (H)  03/08/2017    CO2 24 03/08/2017     2. HTN: Blood pressures not well controlled. 155/80. Will start her on a thiazide diuretic, low dose also to help with the potassium.      3. Renal osteodystrophy: last PTH   Lab Results   Component Value Date     (H) 09/26/2011    CALCIUM 9.3 03/08/2017    CAION 1.22 12/15/2012    PHOS 2.6 (L) 01/29/2016       4. Anemia:   Lab Results   Component Value Date    HGB 10.7 (L) 03/08/2017        5. DM:  Last HbA1C   Lab Results   Component Value Date    HGBA1C 9.6 (H) 03/08/2017       6. Lipid management:   Lab Results   Component Value Date    LDLCALC 69.0 03/08/2017        Follow up in 3 -4 month with labs, labs today and in 2 wks.

## 2017-04-12 NOTE — MR AVS SNAPSHOT
[facility]  [address]  [facility] [address]  Phone: [phone]  Fax: [phone]                  Sherin Ortiz   2017 3:00 PM   Office Visit    Description:  Female : 1945   Provider:  Cynthia Choi MD   Department:  [facility]           Diagnoses this Visit        Comments    Stage 3 chronic kidney disease due to type 2 diabetes mellitus    -  Primary     Nephrolithiasis         Anemia of chronic disease         Hypoalbuminemia         Hyperlipidemia LDL goal <100         Venous ulcer of right lower extremity with varicose veins         Wheelchair dependent         Bilateral leg edema         Benign essential hypertension                To Do List           Future Appointments        Provider Department Dept Phone    2017 2:20 PM [facility] - Wound Care [phone]    2017 11:00 AM MD [facility] - Internal Medicine [phone]      Goals (5 Years of Data)     None       These Medications        Disp Refills Start End    hydrochlorothiazide (HYDRODIURIL) 25 MG tablet 30 tablet 3 2017    Take 1 tablet (25 mg total) by mouth once daily. - Oral    Pharmacy: [facility] [address] [place] [address] [place] [address] [address] AT [facility] [place] Ph #: [phone]         [facility]     [facility] - 24/ Assistance  Unless otherwise directed by your provider, please contact [facility], our nurse care line that is available for  assistance.     Registered nurses in the [facility] provide: appointment scheduling, clinical advisement, health education, and other advisory services.  Call: [phone] (toll free)               Medications           Message regarding Medications     Verify the changes and/or additions to your medication regime listed below are the same as discussed with your clinician today.  If any of these changes or additions are  "incorrect, please notify your healthcare provider.        START taking these NEW medications        Refills    hydrochlorothiazide (HYDRODIURIL) 25 MG tablet 3    Sig: Take 1 tablet (25 mg total) by mouth once daily.    Class: Normal    Route: Oral           Verify that the below list of medications is an accurate representation of the medications you are currently taking.  If none reported, the list may be blank. If incorrect, please contact your healthcare provider. Carry this list with you in case of emergency.           Current Medications     ACCU-CHEK RAMIRO PLUS METER Misc     ACCU-CHEK RAMIRO PLUS TEST STRP Strp TEST TWICE DAILY    ACCU-CHEK SOFTCLIX LANCETS Misc     aspirin 81 MG Chew Take 81 mg by mouth once daily.      atorvastatin (LIPITOR) 40 MG tablet TAKE 1 TABLET EVERY EVENING    BD INSULIN SYRINGE ULTRA-FINE 1/2 mL 30 gauge x 1/2" Syrg USE EVERY NIGHT    clopidogrel (PLAVIX) 75 mg tablet TAKE 1 TABLET ONE TIME DAILY    econazole nitrate 1 % cream Apply 1 application topically once daily. Apply to affected area    glimepiride (AMARYL) 1 MG tablet Take 2 tablets (2 mg total) by mouth with lunch.    LANTUS 100 unit/mL injection INJECT 7 TO 10 UNITS SUBCUTANEOUSLY IN THE EVENING DEPENDING ON SCALE (DISCARD EACH VIAL AFTER 28 DAYS)    lisinopril 10 MG tablet TAKE 1 TABLET ONE TIME DAILY    hydrochlorothiazide (HYDRODIURIL) 25 MG tablet Take 1 tablet (25 mg total) by mouth once daily.           Clinical Reference Information           Your Vitals Were     BP Pulse Height Weight Last Period SpO2    154/82 64 5' 6" (1.676 m) 137 kg (302 lb) (LMP Unknown) 98%    BMI                48.74 kg/m2          Blood Pressure          Most Recent Value    BP  (!)  154/82      Allergies as of 4/12/2017     Penicillins    Sulfa (Sulfonamide Antibiotics)      Immunizations Administered on Date of Encounter - 4/12/2017     None      Orders Placed During Today's Visit     Future Labs/Procedures Expected by Expires    CBC " auto differential  4/12/2017 6/11/2018    CBC auto differential  4/12/2017 6/11/2018    Immunoglobulin free LT chains blood  4/12/2017 6/11/2018    Magnesium  4/12/2017 6/11/2018    Microalbumin/creatinine urine ratio  4/12/2017 6/11/2018    Protein / creatinine ratio, urine  4/12/2017 4/12/2018    Protein / creatinine ratio, urine  4/12/2017 4/12/2018    PTH, intact  4/12/2017 6/11/2018    Renal function panel  4/12/2017 6/11/2018    Uric acid  4/12/2017 6/11/2018    Urinalysis  4/12/2017 4/12/2018    US Retroperitoneal Complete (Kidney and  4/12/2017 4/12/2018    Vitamin D  4/12/2017 4/12/2018      [facility] Sign-Up     Activating your [facility] account is as easy as 1-2-3!     1) Visit [employer] [email or web address], select Sign Up Now, enter this activation code and your date of birth, then select Next.  [email or web address]  Expires: 5/14/2017  2:38 PM      2) Create a username and password to use when you visit [facility] in the future and select a security question in case you lose your password and select Next.    3) Enter your e-mail address and click Sign Up!    Additional Information  If you have questions, please e-mail [email or web address] or call [phone] to talk to our [facility] staff. Remember, [facility] is NOT to be used for urgent needs. For medical emergencies, dial 911.         Instructions    Please see me back in my clinic in 3 month with blood work.   Labs today and in 3 wks       Language Assistance Services     ATTENTION: Language assistance services are available, free of charge. Please call [phone].      ATENCIÓN: Si habla zandraeryn, tiene a hutchins disposición servicios gratuitos de asistencia lingüística. Llame al [phone].     CHÚ Ý: N?u b?n nói Ti?ng Vi?t, có các d?ch v? h? tr? ngôn ng? mi?n phí dành cho b?n. G?i s? [phone].         [facility] complies with applicable Federal civil rights laws and does not discriminate on the basis of race, color, national origin, age,  disability, or sex.

## 2017-04-13 ENCOUNTER — TELEPHONE (OUTPATIENT)
Dept: NEPHROLOGY | Facility: CLINIC | Age: 72
End: 2017-04-13

## 2017-04-13 ENCOUNTER — OFFICE VISIT (OUTPATIENT)
Dept: WOUND CARE | Facility: CLINIC | Age: 72
End: 2017-04-13
Payer: MEDICARE

## 2017-04-13 VITALS — DIASTOLIC BLOOD PRESSURE: 82 MMHG | SYSTOLIC BLOOD PRESSURE: 159 MMHG | TEMPERATURE: 98 F | HEART RATE: 69 BPM

## 2017-04-13 DIAGNOSIS — L97.912 ULCER OF LOWER LIMB, RIGHT, WITH FAT LAYER EXPOSED: ICD-10-CM

## 2017-04-13 DIAGNOSIS — L97.512 ULCER OF TOE, RIGHT, WITH FAT LAYER EXPOSED: ICD-10-CM

## 2017-04-13 DIAGNOSIS — B35.3 TINEA PEDIS OF BOTH FEET: Chronic | ICD-10-CM

## 2017-04-13 DIAGNOSIS — R60.0 BILATERAL LEG EDEMA: ICD-10-CM

## 2017-04-13 DIAGNOSIS — L97.919 VENOUS ULCER OF RIGHT LOWER EXTREMITY WITH VARICOSE VEINS: ICD-10-CM

## 2017-04-13 DIAGNOSIS — I87.2 VENOUS STASIS DERMATITIS OF BOTH LOWER EXTREMITIES: ICD-10-CM

## 2017-04-13 DIAGNOSIS — I83.019 VENOUS ULCER OF RIGHT LOWER EXTREMITY WITH VARICOSE VEINS: ICD-10-CM

## 2017-04-13 PROCEDURE — 99499 UNLISTED E&M SERVICE: CPT | Mod: S$GLB,,, | Performed by: NURSE PRACTITIONER

## 2017-04-13 PROCEDURE — 99999 PR PBB SHADOW E&M-EST. PATIENT-LVL IV: CPT | Mod: PBBFAC,,, | Performed by: NURSE PRACTITIONER

## 2017-04-13 PROCEDURE — 29580 STRAPPING UNNA BOOT: CPT | Mod: RT,S$GLB,, | Performed by: NURSE PRACTITIONER

## 2017-04-13 RX ORDER — ERGOCALCIFEROL 1.25 MG/1
50000 CAPSULE ORAL
Qty: 12 CAPSULE | Refills: 3 | Status: SHIPPED | OUTPATIENT
Start: 2017-04-13 | End: 2017-07-26 | Stop reason: SDUPTHER

## 2017-04-13 NOTE — TELEPHONE ENCOUNTER
Please send patient copy of labs. Also there was no renal profile??   Please asked her about symptoms of a urinary tract infection.              Called pt and l/m

## 2017-04-13 NOTE — PROGRESS NOTES
Subjective:       Patient ID: Sherin Ortiz is a 72 y.o. female.    Chief Complaint: Wound Check    Wound Check     Wound Check:   This patient is seen today for reevaluation of wounds to bilateral lower legs and the right second toe. An unna boot was on the legs but she removed it before coming to clinic today to shower.  The right leg wounds are healing slowly as evidenced by wound contracture.   The left leg wound is healed.  Problems that interfere with wound healing include multiple co-morbidities, diabetes, edema, diabetic neuropathy and  morbid obesity. The patient is afebrile. The patient ambulates with great difficulty secondary to her body habitus.  She has no pain.  She denies increased swelling, redness or purulent drainage.   She is afebrile.   Review of Systems   Constitutional: Negative for chills, diaphoresis and fever.   HENT: Negative for hearing loss, postnasal drip, rhinorrhea, sinus pressure, sneezing, sore throat, tinnitus and trouble swallowing.    Eyes: Negative for visual disturbance.   Respiratory: Negative for apnea, cough, shortness of breath and wheezing.    Cardiovascular: Positive for leg swelling. Negative for chest pain and palpitations.   Gastrointestinal: Negative for constipation, diarrhea, nausea and vomiting.   Genitourinary: Negative for difficulty urinating, dysuria, frequency and hematuria.   Musculoskeletal: Negative for arthralgias, back pain and joint swelling.   Skin: Positive for wound.   Neurological: Negative for dizziness, weakness, light-headedness and headaches.   Hematological: Does not bruise/bleed easily.   Psychiatric/Behavioral: Negative for confusion, decreased concentration, dysphoric mood and sleep disturbance. The patient is not nervous/anxious.    All other systems reviewed and are negative.      Objective:      Physical Exam   Constitutional: She is oriented to person, place, and time. No distress.   Morbidly obese   HENT:   Head: Normocephalic and  atraumatic.   Neck: Normal range of motion. Neck supple.   Pulmonary/Chest: Effort normal.   Musculoskeletal: Normal range of motion. She exhibits edema. She exhibits no tenderness.        Legs:       Feet:    Neurological: She is alert and oriented to person, place, and time.   Skin: Skin is warm and dry. Rash (dermatitis and tinea bilateral lower extremities) noted. She is not diaphoretic. No cyanosis or erythema. No pallor. Nails show no clubbing.   Psychiatric: She has a normal mood and affect. Her behavior is normal. Judgment and thought content normal.   Nursing note and vitals reviewed.    ..  Hemoglobin A1C   Date Value Ref Range Status   03/08/2017 9.6 (H) 4.5 - 6.2 % Final     Comment:     According to ADA guidelines, hemoglobin A1C <7.0% represents  optimal control in non-pregnant diabetic patients.  Different  metrics may apply to specific populations.   Standards of Medical Care in Diabetes - 2016.  For the purpose of screening for the presence of diabetes:  <5.7%     Consistent with the absence of diabetes  5.7-6.4%  Consistent with increasing risk for diabetes   (prediabetes)  >or=6.5%  Consistent with diabetes  Currently no consensus exists for use of hemoglobin A1C  for diagnosis of diabetes for children.     11/08/2016 9.9 (H) 4.5 - 6.2 % Final     Comment:     According to ADA guidelines, hemoglobin A1C <7.0% represents  optimal control in non-pregnant diabetic patients.  Different  metrics may apply to specific populations.   Standards of Medical Care in Diabetes - 2016.  For the purpose of screening for the presence of diabetes:  <5.7%     Consistent with the absence of diabetes  5.7-6.4%  Consistent with increasing risk for diabetes   (prediabetes)  >or=6.5%  Consistent with diabetes  Currently no consensus exists for use of hemoglobin A1C  for diagnosis of diabetes for children.     05/26/2016 9.1 (H) 4.5 - 6.2 % Final     ..  Lab Results   Component Value Date    ALBUMIN 3.0 (L) 03/08/2017      Sherin was seen in the clinic room and placed in the supine position on the treatment table.  Both lower legs were cleansed with Easi-clense sponges and dried thoroughly.  Medihoney gel and a hydrofiber dressing was applied to the bilateral leg wounds.  Eucerin cream was applied to the lower legs.  The ankle was padded with ABD pads.  The patient's feet was positioned at a 90 degree angle.  A zinc oxide wrap, followed by kerlix roll gauze and coban were applied using a spiral technique avoiding creases or folds.  The wraps were started behind the first metatarsal and ended below the tibial tubercle of the knee.  There was overlap of each turn half the width of the previous turn.  The compression wraps will be changed every 3-4 days.      Assessment:       1. Uncontrolled type 2 diabetes mellitus with complication, with long-term current use of insulin    2. Ulcer of lower limb, right, with fat layer exposed    3. Ulcer of toe, right, with fat layer exposed    4. Venous ulcer of right lower extremity with varicose veins    5. Tinea pedis of both feet    6. Venous stasis dermatitis of both lower extremities    7. Bilateral leg edema        Plan:           Spectazole cream to feet and toes.  Triamcinolone cream to lower legs.  Unna boot bilateral lower legs as detailed above.  Patient was warned not to get the dressings wet and to use cast covers for showering.  Should the dressing become wet, she is to remove it, place a wet-to-dry dressing over the wound, cover with gauze and roll gauze and use ace wraps for compression and to secure bandages.  She should then notify home health as soon as possible to have a new dressing applied.  Apply medihoney gel and hydrofiber dressing to right second ulcer, cotton in between toes, cover with gauze and secure with roll gauze.  Change dressing twice weekly.  Return to clinic in 2 weeks.  [facility] notified of orders via email.      Home Health Wound Care Orders.  Cleanse wounds  with wound .  Spectazole cream to feet and toes daily.  Triamcinolone cream to lower legs.  Medihoney gel and hydrofiber dressing to right leg wounds.  ABD pad to both ankles and right shin.  Unna boot (zinc oxide, kerlix and coban) right lower leg.  Medihoney gel and hydrofiber to right second toe ulcer, cotton in between toes, cover right foot wound with gauze and secure with roll gauze.  Change toe dressing twice weekly.  Kerlix and coban left lower leg.   Change wraps twice weekly.    Right shin      Left lateral leg      Right second toe

## 2017-04-13 NOTE — MR AVS SNAPSHOT
[facility]  [address]  [facility] [address]  Phone: [phone]                  Sherin Ortiz   2017 2:20 PM   Office Visit    Description:  Female : 1945   Provider:  Akosua Alvarenga NP   Department:  [facility]           Reason for Visit     Wound Check           Diagnoses this Visit        Comments    Uncontrolled type 2 diabetes mellitus with complication, with long-term current use of insulin    -  Primary     Ulcer of lower limb, right, with fat layer exposed         Ulcer of toe, right, with fat layer exposed         Venous ulcer of right lower extremity with varicose veins         Tinea pedis of both feet         Venous stasis dermatitis of both lower extremities         Bilateral leg edema                To Do List           Future Appointments        Provider Department Dept Phone    2017 2:20 PM [facility] [phone]    2017 2:45 PM [facility] [phone]    2017 11:00 AM Ame Vasquez MD [facility] [phone]      Goals (5 Years of Data)     None      Follow-Up and Disposition     Return in about 2 weeks (around 2017) for Wound evaluation.      [facility]     [facility] Care Line -  Assistance  Unless otherwise directed by your provider, please contact [facility], our nurse care line that is available for  assistance.     Registered nurses in the [facility] provide: appointment scheduling, clinical advisement, health education, and other advisory services.  Call: [phone] (toll free)               Medications           Message regarding Medications     Verify the changes and/or additions to your medication regime listed below are the same as discussed with your clinician today.  If any of these changes or additions are incorrect, please notify your healthcare provider.             Verify that  "the below list of medications is an accurate representation of the medications you are currently taking.  If none reported, the list may be blank. If incorrect, please contact your healthcare provider. Carry this list with you in case of emergency.           Current Medications     ACCU-CHEK RAMIRO PLUS METER Misc     ACCU-CHEK RAMIRO PLUS TEST STRP Strp TEST TWICE DAILY    ACCU-CHEK SOFTCLIX LANCETS Misc     aspirin 81 MG Chew Take 81 mg by mouth once daily.      atorvastatin (LIPITOR) 40 MG tablet TAKE 1 TABLET EVERY EVENING    BD INSULIN SYRINGE ULTRA-FINE 1/2 mL 30 gauge x 1/2" Syrg USE EVERY NIGHT    clopidogrel (PLAVIX) 75 mg tablet TAKE 1 TABLET ONE TIME DAILY    econazole nitrate 1 % cream Apply 1 application topically once daily. Apply to affected area    ergocalciferol (ERGOCALCIFEROL) 50,000 unit Cap Take 1 capsule (50,000 Units total) by mouth every 7 days.    glimepiride (AMARYL) 1 MG tablet Take 2 tablets (2 mg total) by mouth with lunch.    hydrochlorothiazide (HYDRODIURIL) 25 MG tablet Take 1 tablet (25 mg total) by mouth once daily.    LANTUS 100 unit/mL injection INJECT 7 TO 10 UNITS SUBCUTANEOUSLY IN THE EVENING DEPENDING ON SCALE (DISCARD EACH VIAL AFTER 28 DAYS)    lisinopril 10 MG tablet TAKE 1 TABLET ONE TIME DAILY           Clinical Reference Information           Your Vitals Were     Last Period                   (LMP Unknown)           Allergies as of 4/13/2017     Penicillins    Sulfa (Sulfonamide Antibiotics)      Immunizations Administered on Date of Encounter - 4/13/2017     None      [facility] Sign-Up     Activating your [facility] account is as easy as 1-2-3!     1) Visit [email or web address], select Sign Up Now, enter this activation code and your date of birth, then select Next.  [email or web address]  Expires: 5/14/2017  2:38 PM      2) Create a username and password to use when you visit [facility] in the future and select a security question in case you lose your password and select " Next.    3) Enter your e-mail address and click Sign Up!    Additional Information  If you have questions, please e-mail [email or web address] or call [phone] to talk to our [facility] staff. Remember, [facility] is NOT to be used for urgent needs. For medical emergencies, dial 911.         Instructions    Elevate legs as much as possible. Do not get the dressings wet and use cast covers for showering.  Should the dressing become wet, remove it, place a wet-to-dry dressing over the wound, cover with gauze and roll gauze and use ace wraps for compression and to secure bandages.  Notify this office as soon as possible to have a new dressing applied.         Language Assistance Services     ATTENTION: Language assistance services are available, free of charge. Please call [phone].      ATENCIÓN: Si habla thomas, tiene a hutchins disposición servicios gratuitos de asistencia lingüística. Llame al [phone].     CHÚ Ý: N?u b?n nói Ti?ng Vi?t, có các d?ch v? h? tr? ngôn ng? mi?n phí dành cho b?n. G?i s? [phone].         [facility] complies with applicable Federal civil rights laws and does not discriminate on the basis of race, color, national origin, age, disability, or sex.

## 2017-04-18 ENCOUNTER — HOSPITAL ENCOUNTER (OUTPATIENT)
Dept: RADIOLOGY | Facility: HOSPITAL | Age: 72
Discharge: HOME | End: 2017-04-18
Attending: INTERNAL MEDICINE
Payer: MEDICARE

## 2017-04-18 DIAGNOSIS — N18.30 STAGE 3 CHRONIC KIDNEY DISEASE DUE TO TYPE 2 DIABETES MELLITUS: Chronic | ICD-10-CM

## 2017-04-18 DIAGNOSIS — E11.22 STAGE 3 CHRONIC KIDNEY DISEASE DUE TO TYPE 2 DIABETES MELLITUS: Chronic | ICD-10-CM

## 2017-04-18 PROCEDURE — 76770 US EXAM ABDO BACK WALL COMP: CPT | Mod: 26,,, | Performed by: RADIOLOGY

## 2017-04-18 PROCEDURE — 76770 US EXAM ABDO BACK WALL COMP: CPT | Mod: TC

## 2017-04-24 ENCOUNTER — TELEPHONE (OUTPATIENT)
Dept: INTERNAL MEDICINE | Facility: CLINIC | Age: 72
End: 2017-04-24

## 2017-04-24 NOTE — TELEPHONE ENCOUNTER
Please advise is it ok to give a verbal  to recertify pt for wound care 2 times per week for bilateral leg.

## 2017-04-24 NOTE — TELEPHONE ENCOUNTER
----- Message from Vinh Aviles sent at 4/21/2017 11:43 AM CDT -----  Contact: Marti/ [facility]/ [phone] cell  Would like a verbal okay to recertify pt for wound care 2 times per week for bilateral leg.  Please call and advise.    Thank you

## 2017-04-27 ENCOUNTER — OFFICE VISIT (OUTPATIENT)
Dept: WOUND CARE | Facility: CLINIC | Age: 72
End: 2017-04-27
Payer: MEDICARE

## 2017-04-27 VITALS — SYSTOLIC BLOOD PRESSURE: 151 MMHG | TEMPERATURE: 98 F | DIASTOLIC BLOOD PRESSURE: 69 MMHG | HEART RATE: 80 BPM

## 2017-04-27 DIAGNOSIS — I87.313 CHRONIC VENOUS HYPERTENSION (IDIOPATHIC) WITH ULCER OF BILATERAL LOWER EXTREMITY: ICD-10-CM

## 2017-04-27 DIAGNOSIS — B35.3 TINEA PEDIS OF BOTH FEET: Chronic | ICD-10-CM

## 2017-04-27 DIAGNOSIS — E88.09 HYPOALBUMINEMIA: ICD-10-CM

## 2017-04-27 DIAGNOSIS — R60.0 BILATERAL LEG EDEMA: ICD-10-CM

## 2017-04-27 DIAGNOSIS — I87.2 VENOUS STASIS DERMATITIS OF BOTH LOWER EXTREMITIES: ICD-10-CM

## 2017-04-27 DIAGNOSIS — L97.512 ULCER OF TOE, RIGHT, WITH FAT LAYER EXPOSED: ICD-10-CM

## 2017-04-27 DIAGNOSIS — L97.912 ULCER OF LOWER LIMB, RIGHT, WITH FAT LAYER EXPOSED: ICD-10-CM

## 2017-04-27 DIAGNOSIS — L97.929 CHRONIC VENOUS HYPERTENSION (IDIOPATHIC) WITH ULCER OF BILATERAL LOWER EXTREMITY: ICD-10-CM

## 2017-04-27 DIAGNOSIS — L97.921 ULCER OF LEFT LOWER EXTREMITY, LIMITED TO BREAKDOWN OF SKIN: ICD-10-CM

## 2017-04-27 DIAGNOSIS — L97.919 CHRONIC VENOUS HYPERTENSION (IDIOPATHIC) WITH ULCER OF BILATERAL LOWER EXTREMITY: ICD-10-CM

## 2017-04-27 PROCEDURE — 29580 STRAPPING UNNA BOOT: CPT | Mod: 50,S$GLB,, | Performed by: NURSE PRACTITIONER

## 2017-04-27 PROCEDURE — 99499 UNLISTED E&M SERVICE: CPT | Mod: S$GLB,,, | Performed by: NURSE PRACTITIONER

## 2017-04-27 PROCEDURE — 99999 PR PBB SHADOW E&M-EST. PATIENT-LVL IV: CPT | Mod: PBBFAC,,, | Performed by: NURSE PRACTITIONER

## 2017-04-27 NOTE — PROGRESS NOTES
Subjective:       Patient ID: Sherin Ortiz is a 72 y.o. female.    Chief Complaint: Wound Check    Wound Check     Wound Check:   This patient is seen today for reevaluation of wounds to bilateral lower legs and the right second toe. An unna boot was on the legs but she removed it before coming to clinic today to shower.  The right leg wounds are healing slowly as evidenced by wound contracture.   The left leg wound recurred two weeks ago.  Problems that interfere with wound healing include multiple co-morbidities, diabetes, edema, diabetic neuropathy and  morbid obesity. The patient is afebrile. The patient ambulates with great difficulty secondary to her body habitus.  She has no pain.  She denies increased swelling, redness or purulent drainage.   She is afebrile.   Review of Systems   Constitutional: Negative for chills, diaphoresis and fever.   HENT: Negative for hearing loss, postnasal drip, rhinorrhea, sinus pressure, sneezing, sore throat, tinnitus and trouble swallowing.    Eyes: Negative for visual disturbance.   Respiratory: Negative for apnea, cough, shortness of breath and wheezing.    Cardiovascular: Positive for leg swelling. Negative for chest pain and palpitations.   Gastrointestinal: Negative for constipation, diarrhea, nausea and vomiting.   Genitourinary: Negative for difficulty urinating, dysuria, frequency and hematuria.   Musculoskeletal: Negative for arthralgias, back pain and joint swelling.   Skin: Positive for wound.   Neurological: Negative for dizziness, weakness, light-headedness and headaches.   Hematological: Does not bruise/bleed easily.   Psychiatric/Behavioral: Negative for confusion, decreased concentration, dysphoric mood and sleep disturbance. The patient is not nervous/anxious.    All other systems reviewed and are negative.      Objective:      Physical Exam   Constitutional: She is oriented to person, place, and time. No distress.   Morbidly obese   HENT:   Head:  Normocephalic and atraumatic.   Neck: Normal range of motion. Neck supple.   Pulmonary/Chest: Effort normal.   Musculoskeletal: Normal range of motion. She exhibits edema. She exhibits no tenderness.        Legs:       Feet:    Neurological: She is alert and oriented to person, place, and time.   Skin: Skin is warm and dry. Rash (dermatitis and tinea bilateral lower extremities) noted. She is not diaphoretic. No cyanosis or erythema. No pallor. Nails show no clubbing.   Psychiatric: She has a normal mood and affect. Her behavior is normal. Judgment and thought content normal.   Nursing note and vitals reviewed.    ..  Hemoglobin A1C   Date Value Ref Range Status   03/08/2017 9.6 (H) 4.5 - 6.2 % Final     Comment:     According to ADA guidelines, hemoglobin A1C <7.0% represents  optimal control in non-pregnant diabetic patients.  Different  metrics may apply to specific populations.   Standards of Medical Care in Diabetes - 2016.  For the purpose of screening for the presence of diabetes:  <5.7%     Consistent with the absence of diabetes  5.7-6.4%  Consistent with increasing risk for diabetes   (prediabetes)  >or=6.5%  Consistent with diabetes  Currently no consensus exists for use of hemoglobin A1C  for diagnosis of diabetes for children.     11/08/2016 9.9 (H) 4.5 - 6.2 % Final     Comment:     According to ADA guidelines, hemoglobin A1C <7.0% represents  optimal control in non-pregnant diabetic patients.  Different  metrics may apply to specific populations.   Standards of Medical Care in Diabetes - 2016.  For the purpose of screening for the presence of diabetes:  <5.7%     Consistent with the absence of diabetes  5.7-6.4%  Consistent with increasing risk for diabetes   (prediabetes)  >or=6.5%  Consistent with diabetes  Currently no consensus exists for use of hemoglobin A1C  for diagnosis of diabetes for children.     05/26/2016 9.1 (H) 4.5 - 6.2 % Final     ..  Lab Results   Component Value Date    ALBUMIN 3.0  (L) 03/08/2017     Sherin was seen in the clinic room and placed in the supine position on the treatment table.  Both lower legs were cleansed with Easi-clense sponges and dried thoroughly.  Medihoney gel and a hydrofiber dressing was applied to the bilateral leg wounds.  Eucerin cream was applied to the lower legs.  The ankles were padded with ABD pads.  The patient's feet was positioned at a 90 degree angle.  A zinc oxide wrap, followed by kerlix roll gauze and coban were applied using a spiral technique avoiding creases or folds.  The wraps were started behind the first metatarsal and ended below the tibial tubercle of the knee.  There was overlap of each turn half the width of the previous turn.  The compression wraps will be changed every 3-4 days.      Assessment:       1. Uncontrolled type 2 diabetes mellitus with complication, with long-term current use of insulin    2. Ulcer of lower limb, right, with fat layer exposed    3. Ulcer of toe, right, with fat layer exposed    4. Ulcer of left lower extremity, limited to breakdown of skin    5. Chronic venous hypertension (idiopathic) with ulcer of bilateral lower extremity    6. Tinea pedis of both feet    7. Venous stasis dermatitis of both lower extremities    8. Bilateral leg edema    9. Hypoalbuminemia        Plan:           Spectazole cream to feet and toes.  Triamcinolone cream to lower legs.  Unna boot bilateral lower legs as detailed above.  Patient was warned not to get the dressings wet and to use cast covers for showering.  Should the dressing become wet, she is to remove it, place a wet-to-dry dressing over the wound, cover with gauze and roll gauze and use ace wraps for compression and to secure bandages.  She should then notify home health as soon as possible to have a new dressing applied.  Apply medihoney gel and hydrofiber dressing to right second ulcer, cotton in between toes, cover with gauze and secure with roll gauze.  Change dressing twice  weekly.  Return to clinic in 2 weeks.  [facility] notified of orders via email.      Home Health Wound Care Orders.  Cleanse wounds with wound .  Spectazole cream to feet and toes.  Triamcinolone cream to lower legs.  Medihoney gel and hydrofiber dressing to bilateral leg wounds.  ABD pad to both ankles and right shin.  Unna boot (zinc oxide, kerlix and coban) bilateral lower legs.  Medihoney gel and hydrofiber to right second toe ulcer, cotton in between toes, cover right foot wound with gauze and secure with roll gauze.  Change toe dressing twice weekly.  Change leg wraps twice weekly.    Right shin          Left medial leg      Right second toe

## 2017-04-27 NOTE — MR AVS SNAPSHOT
[facility]  [address]  [facility] [address]  Phone: [phone]                  Sherin Ortiz   2017 2:20 PM   Office Visit    Description:  Female : 1945   Provider:  Akosua Alvarenga NP   Department:  [facility]           Reason for Visit     Wound Check           Diagnoses this Visit        Comments    Uncontrolled type 2 diabetes mellitus with complication, with long-term current use of insulin    -  Primary     Ulcer of lower limb, right, with fat layer exposed         Ulcer of toe, right, with fat layer exposed         Ulcer of left lower extremity, limited to breakdown of skin         Chronic venous hypertension (idiopathic) with ulcer of bilateral lower extremity         Tinea pedis of both feet         Venous stasis dermatitis of both lower extremities         Bilateral leg edema         Hypoalbuminemia                To Do List           Future Appointments        Provider Department Dept Phone    2017 2:20 PM [facility] [phone]    2017 7:30 AM LAB, [facility] [phone]    2017 8:45 AM LAB, [facility] [phone]    2017 11:00 AM Ame Vasquez MD [facility] - Internal Medicine [phone]    2017 12:00 PM LAB, [facility] [phone]      Goals (5 Years of Data)     None      Follow-Up and Disposition     Return in about 2 weeks (around 2017) for Wound evaluation.      [facility]     [facility] Care Line -  Assistance  Unless otherwise directed by your provider, please contact [facility], our nurse care line that is available for  assistance.     Registered nurses in the [facility] provide: appointment scheduling, clinical advisement, health education, and other advisory services.  Call: [phone] (toll free)               Medications          "  Message regarding Medications     Verify the changes and/or additions to your medication regime listed below are the same as discussed with your clinician today.  If any of these changes or additions are incorrect, please notify your healthcare provider.             Verify that the below list of medications is an accurate representation of the medications you are currently taking.  If none reported, the list may be blank. If incorrect, please contact your healthcare provider. Carry this list with you in case of emergency.           Current Medications     ACCU-CHEK RAMIRO PLUS METER Misc     ACCU-CHEK RAMIRO PLUS TEST STRP Strp TEST TWICE DAILY    ACCU-CHEK SOFTCLIX LANCETS Misc     aspirin 81 MG Chew Take 81 mg by mouth once daily.      atorvastatin (LIPITOR) 40 MG tablet TAKE 1 TABLET EVERY EVENING    BD INSULIN SYRINGE ULTRA-FINE 1/2 mL 30 gauge x 1/2" Syrg USE EVERY NIGHT    clopidogrel (PLAVIX) 75 mg tablet TAKE 1 TABLET ONE TIME DAILY    econazole nitrate 1 % cream Apply 1 application topically once daily. Apply to affected area    ergocalciferol (ERGOCALCIFEROL) 50,000 unit Cap Take 1 capsule (50,000 Units total) by mouth every 7 days.    glimepiride (AMARYL) 1 MG tablet Take 2 tablets (2 mg total) by mouth with lunch.    hydrochlorothiazide (HYDRODIURIL) 25 MG tablet Take 1 tablet (25 mg total) by mouth once daily.    LANTUS 100 unit/mL injection INJECT 7 TO 10 UNITS SUBCUTANEOUSLY IN THE EVENING DEPENDING ON SCALE (DISCARD EACH VIAL AFTER 28 DAYS)    lisinopril 10 MG tablet TAKE 1 TABLET ONE TIME DAILY           Clinical Reference Information           Your Vitals Were     BP Pulse Temp Last Period          151/69 (BP Location: Left arm, Patient Position: Sitting, BP Method: Automatic) 80 97.7 °F (36.5 °C) (Oral) (LMP Unknown)        Blood Pressure          Most Recent Value    BP  (!)  151/69      Allergies as of 4/27/2017     Penicillins    Sulfa (Sulfonamide Antibiotics)      Immunizations Administered " on Date of Encounter - 4/27/2017     None      [facility] Sign-Up     Activating your [facility] account is as easy as 1-2-3!     1) Visit [email or web address], select Sign Up Now, enter this activation code and your date of birth, then select Next.  [email or web address]  Expires: 5/14/2017  2:38 PM      2) Create a username and password to use when you visit [facility] in the future and select a security question in case you lose your password and select Next.    3) Enter your e-mail address and click Sign Up!    Additional Information  If you have questions, please e-mail [email or web address] or call [phone] to talk to our [facility] staff. Remember, [facility] is NOT to be used for urgent needs. For medical emergencies, dial 911.         Instructions    Elevate legs as much as possible. Do not get the dressings wet and use cast covers for showering.  Should the dressing become wet, remove it, place a wet-to-dry dressing over the wound, cover with gauze and roll gauze and use ace wraps for compression and to secure bandages.  Notify this office as soon as possible to have a new dressing applied.         Language Assistance Services     ATTENTION: Language assistance services are available, free of charge. Please call [phone].      ATENCIÓN: Si habla español, tiene a hutchins disposición servicios gratuitos de asistencia lingüística. Llame al [phone].     CHÚ Ý: N?u b?n nói Ti?ng Vi?t, có các d?ch v? h? tr? ngôn ng? mi?n phí dành cho b?n. G?i s? [phone].         [facility] complies with applicable Federal civil rights laws and does not discriminate on the basis of race, color, national origin, age, disability, or sex.

## 2017-04-30 NOTE — PROGRESS NOTES
Patient, Sherin Ortiz (MRN #[id number]), presented with a recent Estimated Glumerular Filtration Rate (EGFR) between 15 and 29 consistent with the definition of chronic kidney disease stage 4 (ICD10 - N18.4).    eGFR if non    Date Value Ref Range Status   03/08/2017 29.3 (A) >60 mL/min/1.73 m^2 Final     Comment:     Calculation used to obtain the estimated glomerular filtration  rate (eGFR) is the CKD-EPI equation. Since race is unknown   in our information system, the eGFR values for   -American and Non--American patients are given   for each creatinine result.         The patient's chronic kidney disease stage 4 was monitored, evaluated, addressed and/or treated. This addendum to the medical record is made on 04/30/2017.

## 2017-05-01 ENCOUNTER — TELEPHONE (OUTPATIENT)
Dept: NEPHROLOGY | Facility: CLINIC | Age: 72
End: 2017-05-01

## 2017-05-01 NOTE — TELEPHONE ENCOUNTER
----- Message from Cynthia Choi MD sent at 4/30/2017  6:42 PM CDT -----  Patient needs a renal profile which was not done with her ordered labs. Please arrange for it.       Will be drawn on the 5/5/2017

## 2017-05-05 ENCOUNTER — LAB VISIT (OUTPATIENT)
Dept: LAB | Facility: HOSPITAL | Age: 72
End: 2017-05-05
Attending: INTERNAL MEDICINE
Payer: MEDICARE

## 2017-05-05 DIAGNOSIS — E11.22 STAGE 3 CHRONIC KIDNEY DISEASE DUE TO TYPE 2 DIABETES MELLITUS: Chronic | ICD-10-CM

## 2017-05-05 DIAGNOSIS — N18.30 STAGE 3 CHRONIC KIDNEY DISEASE DUE TO TYPE 2 DIABETES MELLITUS: Chronic | ICD-10-CM

## 2017-05-05 DIAGNOSIS — E88.09 HYPOALBUMINEMIA: ICD-10-CM

## 2017-05-05 LAB
CREAT UR-MCNC: 61 MG/DL
PROT UR-MCNC: 27 MG/DL
PROT/CREAT RATIO, UR: 0.44

## 2017-05-05 PROCEDURE — 84156 ASSAY OF PROTEIN URINE: CPT

## 2017-05-11 ENCOUNTER — TELEPHONE (OUTPATIENT)
Dept: OPTOMETRY | Facility: CLINIC | Age: 72
End: 2017-05-11

## 2017-05-25 ENCOUNTER — OFFICE VISIT (OUTPATIENT)
Dept: WOUND CARE | Facility: CLINIC | Age: 72
End: 2017-05-25
Payer: MEDICARE

## 2017-05-25 VITALS — TEMPERATURE: 97 F | HEART RATE: 74 BPM | DIASTOLIC BLOOD PRESSURE: 74 MMHG | SYSTOLIC BLOOD PRESSURE: 130 MMHG

## 2017-05-25 DIAGNOSIS — R60.0 BILATERAL LEG EDEMA: ICD-10-CM

## 2017-05-25 DIAGNOSIS — L97.921 ULCER OF LEFT LOWER EXTREMITY, LIMITED TO BREAKDOWN OF SKIN: ICD-10-CM

## 2017-05-25 DIAGNOSIS — L97.512 ULCER OF TOE, RIGHT, WITH FAT LAYER EXPOSED: ICD-10-CM

## 2017-05-25 DIAGNOSIS — L97.912 ULCER OF LOWER LIMB, RIGHT, WITH FAT LAYER EXPOSED: ICD-10-CM

## 2017-05-25 DIAGNOSIS — I87.2 VENOUS STASIS DERMATITIS OF BOTH LOWER EXTREMITIES: ICD-10-CM

## 2017-05-25 DIAGNOSIS — B35.3 TINEA PEDIS OF BOTH FEET: Chronic | ICD-10-CM

## 2017-05-25 PROCEDURE — 99499 UNLISTED E&M SERVICE: CPT | Mod: S$GLB,,, | Performed by: NURSE PRACTITIONER

## 2017-05-25 PROCEDURE — 99999 PR PBB SHADOW E&M-EST. PATIENT-LVL IV: CPT | Mod: PBBFAC,,, | Performed by: NURSE PRACTITIONER

## 2017-05-25 PROCEDURE — 29580 STRAPPING UNNA BOOT: CPT | Mod: 50,S$GLB,, | Performed by: NURSE PRACTITIONER

## 2017-05-25 NOTE — PROGRESS NOTES
Subjective:       Patient ID: Sherin Ortiz is a 72 y.o. female.    Chief Complaint: Wound Check    Wound Check     Wound Check:   This patient is seen today for reevaluation of wounds to bilateral lower legs and the right second toe. An unna boot was on the legs but she removed it before coming to clinic today to shower.  The right leg wounds are healing slowly as evidenced by wound contracture.   The left leg wound recurred two weeks ago.  Problems that interfere with wound healing include multiple co-morbidities, diabetes, edema, diabetic neuropathy and  morbid obesity. The patient is afebrile. The patient ambulates with great difficulty secondary to her body habitus.  She has no pain.  She denies increased swelling, redness or purulent drainage.   She is afebrile.   Review of Systems   Constitutional: Negative for chills, diaphoresis and fever.   HENT: Negative for hearing loss, postnasal drip, rhinorrhea, sinus pressure, sneezing, sore throat, tinnitus and trouble swallowing.    Eyes: Negative for visual disturbance.   Respiratory: Negative for apnea, cough, shortness of breath and wheezing.    Cardiovascular: Positive for leg swelling. Negative for chest pain and palpitations.   Gastrointestinal: Negative for constipation, diarrhea, nausea and vomiting.   Genitourinary: Negative for difficulty urinating, dysuria, frequency and hematuria.   Musculoskeletal: Negative for arthralgias, back pain and joint swelling.   Skin: Positive for wound.   Neurological: Negative for dizziness, weakness, light-headedness and headaches.   Hematological: Does not bruise/bleed easily.   Psychiatric/Behavioral: Negative for confusion, decreased concentration, dysphoric mood and sleep disturbance. The patient is not nervous/anxious.    All other systems reviewed and are negative.      Objective:      Physical Exam   Constitutional: She is oriented to person, place, and time. No distress.   Morbidly obese   HENT:   Head:  Normocephalic and atraumatic.   Neck: Normal range of motion. Neck supple.   Pulmonary/Chest: Effort normal.   Musculoskeletal: Normal range of motion. She exhibits edema. She exhibits no tenderness.        Legs:       Feet:    Neurological: She is alert and oriented to person, place, and time.   Skin: Skin is warm and dry. Rash (dermatitis and tinea bilateral lower extremities) noted. She is not diaphoretic. No cyanosis or erythema. No pallor. Nails show no clubbing.   Psychiatric: She has a normal mood and affect. Her behavior is normal. Judgment and thought content normal.   Nursing note and vitals reviewed.    ..  Hemoglobin A1C   Date Value Ref Range Status   03/08/2017 9.6 (H) 4.5 - 6.2 % Final     Comment:     According to ADA guidelines, hemoglobin A1C <7.0% represents  optimal control in non-pregnant diabetic patients.  Different  metrics may apply to specific populations.   Standards of Medical Care in Diabetes - 2016.  For the purpose of screening for the presence of diabetes:  <5.7%     Consistent with the absence of diabetes  5.7-6.4%  Consistent with increasing risk for diabetes   (prediabetes)  >or=6.5%  Consistent with diabetes  Currently no consensus exists for use of hemoglobin A1C  for diagnosis of diabetes for children.     11/08/2016 9.9 (H) 4.5 - 6.2 % Final     Comment:     According to ADA guidelines, hemoglobin A1C <7.0% represents  optimal control in non-pregnant diabetic patients.  Different  metrics may apply to specific populations.   Standards of Medical Care in Diabetes - 2016.  For the purpose of screening for the presence of diabetes:  <5.7%     Consistent with the absence of diabetes  5.7-6.4%  Consistent with increasing risk for diabetes   (prediabetes)  >or=6.5%  Consistent with diabetes  Currently no consensus exists for use of hemoglobin A1C  for diagnosis of diabetes for children.     05/26/2016 9.1 (H) 4.5 - 6.2 % Final     ..  Lab Results   Component Value Date    ALBUMIN 3.2  (L) 05/05/2017     Sherin was seen in the clinic room and placed in the supine position on the treatment table.  Both lower legs were cleansed with Easi-clense sponges and dried thoroughly.  Medihoney gel and a hydrofiber dressing was applied to the bilateral leg wounds.  Eucerin cream was applied to the lower legs.  The ankles were padded with ABD pads.  The patient's feet was positioned at a 90 degree angle.  A zinc oxide wrap, followed by kerlix roll gauze and coban were applied using a spiral technique avoiding creases or folds.  The wraps were started behind the first metatarsal and ended below the tibial tubercle of the knee.  There was overlap of each turn half the width of the previous turn.  The compression wraps will be changed every 3-4 days.      Assessment:       1. Uncontrolled type 2 diabetes mellitus with complication, with long-term current use of insulin    2. Ulcer of left lower extremity, limited to breakdown of skin    3. Ulcer of lower limb, right, with fat layer exposed    4. Ulcer of toe, right, with fat layer exposed    5. Chronic venous hypertension with ulcer involving both sides    6. Tinea pedis of both feet    7. Venous stasis dermatitis of both lower extremities    8. Bilateral leg edema        Plan:           Spectazole cream to feet and toes.  Triamcinolone cream to lower legs.  Unna boot bilateral lower legs as detailed above.  Patient was warned not to get the dressings wet and to use cast covers for showering.  Should the dressing become wet, she is to remove it, place a wet-to-dry dressing over the wound, cover with gauze and roll gauze and use ace wraps for compression and to secure bandages.  She should then notify home health as soon as possible to have a new dressing applied.  Apply medihoney gel and hydrofiber dressing to right second ulcer, cotton in between toes, cover with gauze and secure with roll gauze.  Change dressing twice weekly.  Return to clinic in 2 weeks.  [facility] notified of orders via email.      Home Health Wound Care Orders.  Cleanse wounds with wound .  Spectazole cream to feet and toes.  Triamcinolone cream to lower legs.  Medihoney gel and hydrofiber dressing to bilateral leg wounds.  ABD pad to both ankles and right shin.  Unna boot (zinc oxide, kerlix and coban) bilateral lower legs.  Medihoney gel and hydrofiber to right second toe ulcer, cotton in between toes, cover right foot wound with gauze and secure with roll gauze.  Change toe dressing twice weekly.  Change leg wraps twice weekly.    Right medial leg      Right lateral leg      Left medial leg      Right second toe

## 2017-06-09 ENCOUNTER — OFFICE VISIT (OUTPATIENT)
Dept: WOUND CARE | Facility: CLINIC | Age: 72
End: 2017-06-09
Payer: MEDICARE

## 2017-06-09 VITALS
SYSTOLIC BLOOD PRESSURE: 110 MMHG | TEMPERATURE: 98 F | HEIGHT: 66 IN | DIASTOLIC BLOOD PRESSURE: 64 MMHG | HEART RATE: 76 BPM

## 2017-06-09 DIAGNOSIS — I87.2 VENOUS STASIS DERMATITIS OF BOTH LOWER EXTREMITIES: ICD-10-CM

## 2017-06-09 DIAGNOSIS — L97.921 ULCER OF LEFT LOWER EXTREMITY, LIMITED TO BREAKDOWN OF SKIN: ICD-10-CM

## 2017-06-09 DIAGNOSIS — L97.512 ULCER OF TOE, RIGHT, WITH FAT LAYER EXPOSED: ICD-10-CM

## 2017-06-09 DIAGNOSIS — I87.2 VENOUS INSUFFICIENCY OF BOTH LOWER EXTREMITIES: ICD-10-CM

## 2017-06-09 DIAGNOSIS — L97.912 ULCER OF LOWER LIMB, RIGHT, WITH FAT LAYER EXPOSED: ICD-10-CM

## 2017-06-09 DIAGNOSIS — B35.3 TINEA PEDIS OF BOTH FEET: Chronic | ICD-10-CM

## 2017-06-09 DIAGNOSIS — R60.0 BILATERAL LEG EDEMA: ICD-10-CM

## 2017-06-09 PROCEDURE — 99499 UNLISTED E&M SERVICE: CPT | Mod: S$GLB,,, | Performed by: NURSE PRACTITIONER

## 2017-06-09 PROCEDURE — 29580 STRAPPING UNNA BOOT: CPT | Mod: 50,S$GLB,, | Performed by: NURSE PRACTITIONER

## 2017-06-09 PROCEDURE — 99999 PR PBB SHADOW E&M-EST. PATIENT-LVL V: CPT | Mod: PBBFAC,,, | Performed by: NURSE PRACTITIONER

## 2017-06-09 NOTE — PROGRESS NOTES
Subjective:       Patient ID: Sherin Ortiz is a 72 y.o. female.    Chief Complaint: Wound Check    Wound Check     Wound Check:   This patient is seen today for reevaluation of wounds to bilateral lower legs and the right second toe. An unna boot was on the legs but she removed it before coming to clinic today to shower.  The right leg wounds are healing slowly as evidenced by wound contracture.   The left leg wound recurred two weeks ago.  Problems that interfere with wound healing include multiple co-morbidities, diabetes, edema, diabetic neuropathy and  morbid obesity. The patient is afebrile. The patient ambulates with great difficulty secondary to her body habitus.  She has no pain.  She denies increased swelling, redness or purulent drainage.   She is afebrile.   Review of Systems   Constitutional: Negative for chills, diaphoresis and fever.   HENT: Negative for hearing loss, postnasal drip, rhinorrhea, sinus pressure, sneezing, sore throat, tinnitus and trouble swallowing.    Eyes: Negative for visual disturbance.   Respiratory: Negative for apnea, cough, shortness of breath and wheezing.    Cardiovascular: Positive for leg swelling. Negative for chest pain and palpitations.   Gastrointestinal: Negative for constipation, diarrhea, nausea and vomiting.   Genitourinary: Negative for difficulty urinating, dysuria, frequency and hematuria.   Musculoskeletal: Negative for arthralgias, back pain and joint swelling.   Skin: Positive for wound.   Neurological: Negative for dizziness, weakness, light-headedness and headaches.   Hematological: Does not bruise/bleed easily.   Psychiatric/Behavioral: Negative for confusion, decreased concentration, dysphoric mood and sleep disturbance. The patient is not nervous/anxious.    All other systems reviewed and are negative.      Objective:      Physical Exam   Constitutional: She is oriented to person, place, and time. No distress.   Morbidly obese   HENT:   Head:  Normocephalic and atraumatic.   Neck: Normal range of motion. Neck supple.   Pulmonary/Chest: Effort normal.   Musculoskeletal: Normal range of motion. She exhibits edema. She exhibits no tenderness.        Legs:       Feet:    Neurological: She is alert and oriented to person, place, and time.   Skin: Skin is warm and dry. Rash (dermatitis and tinea bilateral lower extremities) noted. She is not diaphoretic. No cyanosis or erythema. No pallor. Nails show no clubbing.   Psychiatric: She has a normal mood and affect. Her behavior is normal. Judgment and thought content normal.   Nursing note and vitals reviewed.    ..  Hemoglobin A1C   Date Value Ref Range Status   03/08/2017 9.6 (H) 4.5 - 6.2 % Final     Comment:     According to ADA guidelines, hemoglobin A1C <7.0% represents  optimal control in non-pregnant diabetic patients.  Different  metrics may apply to specific populations.   Standards of Medical Care in Diabetes - 2016.  For the purpose of screening for the presence of diabetes:  <5.7%     Consistent with the absence of diabetes  5.7-6.4%  Consistent with increasing risk for diabetes   (prediabetes)  >or=6.5%  Consistent with diabetes  Currently no consensus exists for use of hemoglobin A1C  for diagnosis of diabetes for children.     11/08/2016 9.9 (H) 4.5 - 6.2 % Final     Comment:     According to ADA guidelines, hemoglobin A1C <7.0% represents  optimal control in non-pregnant diabetic patients.  Different  metrics may apply to specific populations.   Standards of Medical Care in Diabetes - 2016.  For the purpose of screening for the presence of diabetes:  <5.7%     Consistent with the absence of diabetes  5.7-6.4%  Consistent with increasing risk for diabetes   (prediabetes)  >or=6.5%  Consistent with diabetes  Currently no consensus exists for use of hemoglobin A1C  for diagnosis of diabetes for children.     05/26/2016 9.1 (H) 4.5 - 6.2 % Final     ..  Lab Results   Component Value Date    ALBUMIN 3.2  (L) 05/05/2017     Sherin was seen in the clinic room and placed in the supine position on the treatment table.  Both lower legs were cleansed with Easi-clense sponges and dried thoroughly.  Medihoney gel and a hydrofiber dressing was applied to the bilateral leg wounds.  Eucerin cream was applied to the lower legs.  The ankles were padded with ABD pads.  The patient's feet was positioned at a 90 degree angle.  A zinc oxide wrap, followed by kerlix roll gauze and coban were applied using a spiral technique avoiding creases or folds.  The wraps were started behind the first metatarsal and ended below the tibial tubercle of the knee.  There was overlap of each turn half the width of the previous turn.  The compression wraps will be changed every 3-4 days.      Assessment:       1. Uncontrolled type 2 diabetes mellitus with complication, with long-term current use of insulin    2. Ulcer of left lower extremity, limited to breakdown of skin    3. Ulcer of lower limb, right, with fat layer exposed    4. Ulcer of toe, right, with fat layer exposed    5. Venous insufficiency of both lower extremities    6. Tinea pedis of both feet    7. Venous stasis dermatitis of both lower extremities    8. Bilateral leg edema        Plan:           Spectazole cream to feet and toes.  Triamcinolone cream to lower legs.  Unna boot bilateral lower legs as detailed above.  Patient was warned not to get the dressings wet and to use cast covers for showering.  Should the dressing become wet, she is to remove it, place a wet-to-dry dressing over the wound, cover with gauze and roll gauze and use ace wraps for compression and to secure bandages.  She should then notify home health as soon as possible to have a new dressing applied.  Apply medihoney gel and hydrofiber dressing to right second ulcer, cotton in between toes, cover with gauze and secure with roll gauze.  Change dressing twice weekly.  Return to clinic in 2 weeks.  [facility] notified  of orders via email.      Home Health Wound Care Orders.  Cleanse wounds with wound .  Spectazole cream to feet and toes.  Triamcinolone cream to lower legs.  Medihoney gel and hydrofiber dressing to bilateral leg wounds.  ABD pad to both ankles and right shin.  Unna boot (zinc oxide, kerlix and coban) bilateral lower legs.  Medihoney gel and hydrofiber to right second toe ulcer, cotton in between toes, cover right foot wound with gauze and secure with roll gauze.  Change toe dressing twice weekly.  Change leg wraps twice weekly.    Right medial leg      Right lateral leg      Left medial leg      Right second toe

## 2017-06-20 ENCOUNTER — TELEPHONE (OUTPATIENT)
Dept: INTERNAL MEDICINE | Facility: CLINIC | Age: 72
End: 2017-06-20

## 2017-06-20 NOTE — TELEPHONE ENCOUNTER
----- Message from Martha Kc sent at 6/20/2017 12:35 PM CDT -----  Contact: [facility] [place] [phone]  Need orders to re certify pt to home health for womb bare for legs and two new wombs

## 2017-06-23 ENCOUNTER — OFFICE VISIT (OUTPATIENT)
Dept: WOUND CARE | Facility: CLINIC | Age: 72
End: 2017-06-23
Payer: MEDICARE

## 2017-06-23 VITALS
HEART RATE: 67 BPM | HEIGHT: 67 IN | DIASTOLIC BLOOD PRESSURE: 65 MMHG | SYSTOLIC BLOOD PRESSURE: 147 MMHG | BODY MASS INDEX: 45.99 KG/M2 | TEMPERATURE: 98 F | WEIGHT: 293 LBS

## 2017-06-23 DIAGNOSIS — I87.2 VENOUS STASIS DERMATITIS OF BOTH LOWER EXTREMITIES: ICD-10-CM

## 2017-06-23 DIAGNOSIS — E88.09 HYPOALBUMINEMIA: ICD-10-CM

## 2017-06-23 DIAGNOSIS — L97.512 ULCER OF TOE, RIGHT, WITH FAT LAYER EXPOSED: ICD-10-CM

## 2017-06-23 DIAGNOSIS — L97.912 ULCER OF LOWER LIMB, RIGHT, WITH FAT LAYER EXPOSED: ICD-10-CM

## 2017-06-23 DIAGNOSIS — R60.0 BILATERAL LEG EDEMA: ICD-10-CM

## 2017-06-23 DIAGNOSIS — B35.3 TINEA PEDIS OF BOTH FEET: Chronic | ICD-10-CM

## 2017-06-23 DIAGNOSIS — I87.2 VENOUS INSUFFICIENCY OF BOTH LOWER EXTREMITIES: ICD-10-CM

## 2017-06-23 DIAGNOSIS — L97.921 ULCER OF LEFT LOWER EXTREMITY, LIMITED TO BREAKDOWN OF SKIN: ICD-10-CM

## 2017-06-23 PROCEDURE — 99499 UNLISTED E&M SERVICE: CPT | Mod: S$GLB,,, | Performed by: NURSE PRACTITIONER

## 2017-06-23 PROCEDURE — 29580 STRAPPING UNNA BOOT: CPT | Mod: 50,S$GLB,, | Performed by: NURSE PRACTITIONER

## 2017-06-23 PROCEDURE — 99999 PR PBB SHADOW E&M-EST. PATIENT-LVL V: CPT | Mod: PBBFAC,,, | Performed by: NURSE PRACTITIONER

## 2017-06-23 NOTE — PROGRESS NOTES
Subjective:       Patient ID: Sherin Ortiz is a 72 y.o. female.    Chief Complaint: Follow-up and Wound Check    Wound Check     Wound Check:   This patient is seen today for reevaluation of wounds to bilateral lower legs and the right second toe. An unna boot was on the legs but she removed it before coming to clinic today to shower.  The right leg wounds are healing slowly as evidenced by wound contracture.   The left leg wound recurred two weeks ago. She has new ulcer to the left and right dorsal foot and second toes.  Problems that interfere with wound healing include multiple co-morbidities, diabetes, edema, diabetic neuropathy and  morbid obesity. The patient is afebrile. The patient ambulates with great difficulty secondary to her body habitus.  She has no pain.  She denies increased swelling, redness or purulent drainage.   She is afebrile.   Review of Systems   Constitutional: Negative for chills, diaphoresis and fever.   HENT: Negative for hearing loss, postnasal drip, rhinorrhea, sinus pressure, sneezing, sore throat, tinnitus and trouble swallowing.    Eyes: Negative for visual disturbance.   Respiratory: Negative for apnea, cough, shortness of breath and wheezing.    Cardiovascular: Positive for leg swelling. Negative for chest pain and palpitations.   Gastrointestinal: Negative for constipation, diarrhea, nausea and vomiting.   Genitourinary: Negative for difficulty urinating, dysuria, frequency and hematuria.   Musculoskeletal: Negative for arthralgias, back pain and joint swelling.   Skin: Positive for wound.   Neurological: Negative for dizziness, weakness, light-headedness and headaches.   Hematological: Does not bruise/bleed easily.   Psychiatric/Behavioral: Negative for confusion, decreased concentration, dysphoric mood and sleep disturbance. The patient is not nervous/anxious.    All other systems reviewed and are negative.      Objective:      Physical Exam   Constitutional: She is  oriented to person, place, and time. No distress.   Morbidly obese   HENT:   Head: Normocephalic and atraumatic.   Neck: Normal range of motion. Neck supple.   Pulmonary/Chest: Effort normal.   Musculoskeletal: Normal range of motion. She exhibits edema. She exhibits no tenderness.        Legs:       Feet:    Neurological: She is alert and oriented to person, place, and time.   Skin: Skin is warm and dry. Rash (dermatitis and tinea bilateral lower extremities) noted. She is not diaphoretic. No cyanosis or erythema. No pallor. Nails show no clubbing.   Psychiatric: She has a normal mood and affect. Her behavior is normal. Judgment and thought content normal.   Nursing note and vitals reviewed.    ..  Hemoglobin A1C   Date Value Ref Range Status   03/08/2017 9.6 (H) 4.5 - 6.2 % Final     Comment:     According to ADA guidelines, hemoglobin A1C <7.0% represents  optimal control in non-pregnant diabetic patients.  Different  metrics may apply to specific populations.   Standards of Medical Care in Diabetes - 2016.  For the purpose of screening for the presence of diabetes:  <5.7%     Consistent with the absence of diabetes  5.7-6.4%  Consistent with increasing risk for diabetes   (prediabetes)  >or=6.5%  Consistent with diabetes  Currently no consensus exists for use of hemoglobin A1C  for diagnosis of diabetes for children.     11/08/2016 9.9 (H) 4.5 - 6.2 % Final     Comment:     According to ADA guidelines, hemoglobin A1C <7.0% represents  optimal control in non-pregnant diabetic patients.  Different  metrics may apply to specific populations.   Standards of Medical Care in Diabetes - 2016.  For the purpose of screening for the presence of diabetes:  <5.7%     Consistent with the absence of diabetes  5.7-6.4%  Consistent with increasing risk for diabetes   (prediabetes)  >or=6.5%  Consistent with diabetes  Currently no consensus exists for use of hemoglobin A1C  for diagnosis of diabetes for children.     05/26/2016  9.1 (H) 4.5 - 6.2 % Final     ..  Lab Results   Component Value Date    ALBUMIN 3.2 (L) 05/05/2017     Sherin was seen in the clinic room and placed in the supine position on the treatment table.  Both lower legs were cleansed with Easi-clense sponges and dried thoroughly.  Medihoney gel and a hydrofiber dressing was applied to the bilateral leg wounds.  Eucerin cream was applied to the lower legs.  The ankles were padded with ABD pads.  The patient's feet was positioned at a 90 degree angle.  A zinc oxide wrap, followed by kerlix roll gauze and coban were applied using a spiral technique avoiding creases or folds.  The wraps were started behind the first metatarsal and ended below the tibial tubercle of the knee.  There was overlap of each turn half the width of the previous turn.  The compression wraps will be changed every 3-4 days.      Assessment:       1. Uncontrolled type 2 diabetes mellitus with complication, with long-term current use of insulin    2. Ulcer of left lower extremity, limited to breakdown of skin    3. Ulcer of lower limb, right, with fat layer exposed    4. Ulcer of toe, right, with fat layer exposed    5. Venous insufficiency of both lower extremities    6. Tinea pedis of both feet    7. Venous stasis dermatitis of both lower extremities    8. Hypoalbuminemia    9. Bilateral leg edema        Plan:           Spectazole cream to feet and toes.  Triamcinolone cream to lower legs.  Unna boot bilateral lower legs as detailed above.  Patient was warned not to get the dressings wet and to use cast covers for showering.  Should the dressing become wet, she is to remove it, place a wet-to-dry dressing over the wound, cover with gauze and roll gauze and use ace wraps for compression and to secure bandages.  She should then notify home health as soon as possible to have a new dressing applied.  Apply medihoney gel and hydrofiber dressing to bilateral foot and toe ulcers, cotton in between toes, cover  with gauze and secure with roll gauze.  Change dressing twice weekly.  Return to clinic in 2 weeks.  [facility] notified of orders via email.      Home Health Wound Care Orders.  Cleanse wounds with wound .  Spectazole cream to feet and toes.  Triamcinolone cream to lower legs.  Medihoney gel and hydrofiber dressing to bilateral leg wounds.  ABD pad to both ankles and right shin.  Unna boot (zinc oxide, kerlix and coban) bilateral lower legs.  Medihoney gel and hydrofiber to bilateral foot and toe ulcers, cotton in between toes, cover right foot wound with gauze and secure with roll gauze.  Change toe dressing twice weekly.  Change leg wraps twice weekly.    Right medial leg      Right lateral leg      Left medial leg      Right second toe and dorsal foot      Left second toe and dorsal foot      Right medial heel

## 2017-06-27 ENCOUNTER — TELEPHONE (OUTPATIENT)
Dept: WOUND CARE | Facility: CLINIC | Age: 72
End: 2017-06-27

## 2017-06-27 NOTE — TELEPHONE ENCOUNTER
----- Message from Mindi Arora RN sent at 6/26/2017 11:38 AM CDT -----  Please see below.    ----- Message -----  From: Domenic Doan  Sent: 6/26/2017   8:46 AM  To: [facility]    Patient states that (s)he needs to speak with nurse in ref to her debridement appt at [facility] on 2mrw;pt states that there is barely a wound and would like to know if she still needs to attend that appt//please call back at [phone]//thank you

## 2017-07-05 NOTE — PROGRESS NOTES
"Subjective:      Patient ID: Sherin Ortiz is a 72 y.o. female.    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    Home is coming twice a week for wound care, is improving slowly. She also takes her blood pressure which helps with assessment of blood pressure change for renal problems    Diabetes type 2  medication compliance: compliant all of the time, diabetic diet compliance: compliant all of the time, home glucose monitoring: is performed regularly  Pulse 66   Ht 5' 6" (1.676 m)   LMP  (LMP Unknown)   SpO2 96% ,   Hemoglobin A1C   Date Value Ref Range Status   03/08/2017 9.6 (H) 4.5 - 6.2 % Final     Comment:     According to ADA guidelines, hemoglobin A1C <7.0% represents  optimal control in non-pregnant diabetic patients.  Different  metrics may apply to specific populations.   Standards of Medical Care in Diabetes - 2016.  For the purpose of screening for the presence of diabetes:  <5.7%     Consistent with the absence of diabetes  5.7-6.4%  Consistent with increasing risk for diabetes   (prediabetes)  >or=6.5%  Consistent with diabetes  Currently no consensus exists for use of hemoglobin A1C  for diagnosis of diabetes for children.     11/08/2016 9.9 (H) 4.5 - 6.2 % Final     Comment:     According to ADA guidelines, hemoglobin A1C <7.0% represents  optimal control in non-pregnant diabetic patients.  Different  metrics may apply to specific populations.   Standards of Medical Care in Diabetes - 2016.  For the purpose of screening for the presence of diabetes:  <5.7%     Consistent with the absence of diabetes  5.7-6.4%  Consistent with increasing risk for diabetes   (prediabetes)  >or=6.5%  Consistent with diabetes  Currently no consensus exists for use of hemoglobin A1C  for diagnosis of " diabetes for children.     05/26/2016 9.1 (H) 4.5 - 6.2 % Final   ,   Creatinine   Date Value Ref Range Status   05/05/2017 1.5 (H) 0.5 - 1.4 mg/dL Final   03/08/2017 1.7 (H) 0.5 - 1.4 mg/dL Final   11/08/2016 1.6 (H) 0.5 - 1.4 mg/dL Final       Lab Results   Component Value Date    LDLCALC 69.0 03/08/2017    LDLCALC 56.6 (L) 11/08/2016    LDLCALC 76.0 05/26/2016         Patient Active Problem List   Diagnosis    Bilateral leg edema    Tinea pedis    Hyperlipidemia LDL goal <100    Benign essential hypertension    PSC (posterior subcapsular cataract) - Both Eyes    Nuclear sclerosis - Both Eyes    Asteroid hyalosis - Left Eye    Stage 3 chronic kidney disease due to type 2 diabetes mellitus    Inability to walk    Morbid obesity with BMI of 40.0-44.9, adult    Moderate nonproliferative diabetic retinopathy, without macular edema, associated with type 2 diabetes mellitus    Diabetic peripheral neuropathy associated with type 2 diabetes mellitus    Anemia of chronic disease    Hypoalbuminemia    Wheelchair dependent    Ureteral stone with hydronephrosis    Type II diabetes mellitus with complication, uncontrolled    Ulcer of toe    Venous insufficiency of leg    Nephrolithiasis    Ulcer of lower limb    Dermatitis, stasis    Chronic venous hypertension with ulcer involving both sides    Uncontrolled type 2 diabetes mellitus with stage 3 chronic kidney disease, with long-term current use of insulin    Ulcer of left lower extremity, limited to breakdown of skin     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    Benign essential hypertension 11/14/2012    Cataract     s/p phacoemulsification    Chronic kidney disease (CKD), stage III (moderate) 9/12/2013    Diabetic peripheral neuropathy associated with type 2 diabetes mellitus 11/14/2014    causing right hemiparesis    Gait disorder     Hyperlipidemia     Iritis - Both Eyes 6/10/2013     Kidney stone     Lymphedema     Morbid obesity with BMI of 40.0-44.9, adult 2/18/2015    NS (nuclear sclerosis) 4/1/2013    Nuclear sclerosis - Both Eyes 4/29/2013    Preseptal cellulitis - Right Eye 4/29/2013    Proliferative diabetic retinopathy - Both Eyes 4/29/2013    Proliferative diabetic retinopathy, both eyes 4/1/2013    PSC (posterior subcapsular cataract) - Both Eyes 4/29/2013    S/P hernia repair 12/19/2012    TIA (transient ischemic attack) 11/18/2014    Tinea pedis 7/24/2012    Tinea pedis is present on both feet.     Type 2 diabetes mellitus with renal manifestations, controlled 12/12/2013    Type 2 diabetes, controlled, with moderate nonproliferative diabetic retinopathy without macular edema 9/17/2015    Ulcer of left lower extremity, limited to breakdown of skin 7/8/2015    Unspecified cerebral artery occlusion with cerebral infarction 11/16/2014    Unspecified venous (peripheral) insufficiency     UTI (lower urinary tract infection)     Vaginal infection     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     Past Surgical History:   Procedure Laterality Date    APPENDECTOMY      CATARACT EXTRACTION W/  INTRAOCULAR LENS IMPLANT Left 5/21/2013    CATARACT EXTRACTION W/  INTRAOCULAR LENS IMPLANT Right 6/4/2013    CHOLECYSTECTOMY      COLONOSCOPY  12/22/2005    normal    ESOPHAGOGASTRODUODENOSCOPY  12/21/2015    hiatal hernia, Schatzki ring    EYE SURGERY Bilateral 2008    laser surgery both eyes    NASAL SEPTUM SURGERY      SUBTOTAL COLECTOMY  12/13/2012    transverse colon, for incarcerated umbilical hernia, Dr. Kat Bower     Family History   Problem Relation Age of Onset    Diabetes Sister     Cataracts Sister     Heart disease Brother     Cataracts Brother     Leukemia Mother     Cancer Neg Hx     Amblyopia Neg Hx     Blindness Neg Hx     Glaucoma Neg Hx     Hypertension Neg Hx     Macular degeneration Neg Hx     Retinal detachment Neg Hx      "Strabismus Neg Hx     Stroke Neg Hx     Thyroid disease Neg Hx     Kidney disease Neg Hx      Review of Systems   Constitutional: Negative for chills, fever and unexpected weight change.   HENT: Negative for trouble swallowing.    Respiratory: Negative for cough, shortness of breath and wheezing.    Cardiovascular: Negative for chest pain and palpitations.   Gastrointestinal: Negative for abdominal distention, abdominal pain, blood in stool and vomiting.   Musculoskeletal: Negative for back pain.     Objective:     Vitals:    07/06/17 1044   Pulse: 66   SpO2: 96%   Height: 5' 6" (1.676 m)   PainSc: 0-No pain     There is no height or weight on file to calculate BMI.  Physical Exam   Constitutional: She is oriented to person, place, and time. She appears well-developed and well-nourished. No distress.   Neck: Carotid bruit is not present. No thyromegaly present.   Cardiovascular: Normal rate, regular rhythm and normal heart sounds.  PMI is not displaced.    Pulmonary/Chest: Effort normal and breath sounds normal. No respiratory distress.   Abdominal: Soft. Bowel sounds are normal. She exhibits no distension. There is no tenderness.   Musculoskeletal: She exhibits no edema.   Neurological: She is alert and oriented to person, place, and time.   Skin:   Legs wrapped for wound care bilaterally     Assessment:     1. Uncontrolled type 2 diabetes mellitus with complication, with long-term current use of insulin    2. Uncontrolled type 2 diabetes mellitus with stage 3 chronic kidney disease, with long-term current use of insulin    3. Benign essential hypertension    4. Hyperlipidemia LDL goal <100    5. Screening exam for skin cancer    6. Screening for colon cancer      Plan:   Sherin was seen today for follow-up.    Diagnoses and all orders for this visit:    Uncontrolled type 2 diabetes mellitus with complication, with long-term current use of insulin  -     Ambulatory consult to Podiatry  -     Ambulatory referral to " "Ophthalmology  -     Hemoglobin A1c; Future    Uncontrolled type 2 diabetes mellitus with stage 3 chronic kidney disease, with long-term current use of insulin    Benign essential hypertension    Hyperlipidemia LDL goal <100    Screening exam for skin cancer    Screening for colon cancer  -     Fecal Immunochemical Test (iFOBT); Future    Other orders  -     Cancel: DXA Bone Density Spine And Hip; Future  -     Cancel: Ambulatory referral to Optometry           Medication List          Accurate as of 7/6/17 11:27 AM. If you have any questions, ask your nurse or doctor.               CHANGE how you take these medications    LANTUS 100 unit/mL injection  Generic drug:  insulin glargine  INJECT 7 TO 10 UNITS SUBCUTANEOUSLY IN THE EVENING DEPENDING ON SCALE (DISCARD EACH VIAL AFTER 28 DAYS)  What changed:  See the new instructions.        CONTINUE taking these medications    ACCU-CHEK RAMIRO PLUS METER Misc  Generic drug:  blood-glucose meter     ACCU-CHEK RAMIRO PLUS TEST STRP Strp  Generic drug:  blood sugar diagnostic  TEST TWICE DAILY     ACCU-CHEK SOFTCLIX LANCETS Misc  Generic drug:  lancets     aspirin 81 MG Chew     atorvastatin 40 MG tablet  Commonly known as:  LIPITOR  TAKE 1 TABLET EVERY EVENING     BD INSULIN SYRINGE ULTRA-FINE 1/2 mL 30 gauge x 1/2" Syrg  Generic drug:  insulin syringe-needle U-100  USE EVERY NIGHT     clopidogrel 75 mg tablet  Commonly known as:  PLAVIX  TAKE 1 TABLET ONE TIME DAILY     econazole nitrate 1 % cream  Apply 1 application topically once daily. Apply to affected area     ergocalciferol 50,000 unit Cap  Commonly known as:  ERGOCALCIFEROL  Take 1 capsule (50,000 Units total) by mouth every 7 days.     glimepiride 1 MG tablet  Commonly known as:  AMARYL  Take 2 tablets (2 mg total) by mouth with lunch.     hydrochlorothiazide 25 MG tablet  Commonly known as:  HYDRODIURIL  Take 1 tablet (25 mg total) by mouth once daily.     lisinopril 10 MG tablet  TAKE 1 TABLET ONE TIME DAILY      "

## 2017-07-06 ENCOUNTER — LAB VISIT (OUTPATIENT)
Dept: LAB | Facility: HOSPITAL | Age: 72
End: 2017-07-06
Attending: INTERNAL MEDICINE
Payer: MEDICARE

## 2017-07-06 ENCOUNTER — OFFICE VISIT (OUTPATIENT)
Dept: INTERNAL MEDICINE | Facility: CLINIC | Age: 72
End: 2017-07-06
Payer: MEDICARE

## 2017-07-06 ENCOUNTER — DOCUMENTATION ONLY (OUTPATIENT)
Dept: INTERNAL MEDICINE | Facility: CLINIC | Age: 72
End: 2017-07-06

## 2017-07-06 VITALS
HEIGHT: 66 IN | SYSTOLIC BLOOD PRESSURE: 128 MMHG | DIASTOLIC BLOOD PRESSURE: 64 MMHG | OXYGEN SATURATION: 96 % | HEART RATE: 66 BPM

## 2017-07-06 DIAGNOSIS — E11.22 STAGE 3 CHRONIC KIDNEY DISEASE DUE TO TYPE 2 DIABETES MELLITUS: Chronic | ICD-10-CM

## 2017-07-06 DIAGNOSIS — Z12.11 SCREENING FOR COLON CANCER: ICD-10-CM

## 2017-07-06 DIAGNOSIS — I77.9 PAOD (PERIPHERAL ARTERIAL OCCLUSIVE DISEASE): ICD-10-CM

## 2017-07-06 DIAGNOSIS — E21.3 HYPERPARATHYROIDISM: ICD-10-CM

## 2017-07-06 DIAGNOSIS — N18.30 STAGE 3 CHRONIC KIDNEY DISEASE DUE TO TYPE 2 DIABETES MELLITUS: Chronic | ICD-10-CM

## 2017-07-06 DIAGNOSIS — Z12.83 SCREENING EXAM FOR SKIN CANCER: ICD-10-CM

## 2017-07-06 DIAGNOSIS — I70.0 AORTIC ATHEROSCLEROSIS: ICD-10-CM

## 2017-07-06 DIAGNOSIS — I10 BENIGN ESSENTIAL HYPERTENSION: Chronic | ICD-10-CM

## 2017-07-06 DIAGNOSIS — E78.5 HYPERLIPIDEMIA LDL GOAL <100: Chronic | ICD-10-CM

## 2017-07-06 LAB
BACTERIA #/AREA URNS AUTO: ABNORMAL /HPF
BILIRUB UR QL STRIP: NEGATIVE
CLARITY UR REFRACT.AUTO: ABNORMAL
COLOR UR AUTO: YELLOW
CREAT UR-MCNC: 97 MG/DL
GLUCOSE UR QL STRIP: NEGATIVE
HGB UR QL STRIP: ABNORMAL
KETONES UR QL STRIP: NEGATIVE
LEUKOCYTE ESTERASE UR QL STRIP: ABNORMAL
MICROSCOPIC COMMENT: ABNORMAL
NITRITE UR QL STRIP: POSITIVE
PH UR STRIP: 5 [PH] (ref 5–8)
PROT UR QL STRIP: NEGATIVE
PROT UR-MCNC: 15 MG/DL
PROT/CREAT RATIO, UR: 0.15
RBC #/AREA URNS AUTO: 5 /HPF (ref 0–4)
SP GR UR STRIP: 1.01 (ref 1–1.03)
SQUAMOUS #/AREA URNS AUTO: 3 /HPF
URN SPEC COLLECT METH UR: ABNORMAL
UROBILINOGEN UR STRIP-ACNC: NEGATIVE EU/DL
WBC #/AREA URNS AUTO: >100 /HPF (ref 0–5)
WBC CLUMPS UR QL AUTO: ABNORMAL

## 2017-07-06 PROCEDURE — 99214 OFFICE O/P EST MOD 30 MIN: CPT | Mod: S$GLB,,, | Performed by: INTERNAL MEDICINE

## 2017-07-06 PROCEDURE — 81001 URINALYSIS AUTO W/SCOPE: CPT

## 2017-07-06 PROCEDURE — 3066F NEPHROPATHY DOC TX: CPT | Mod: S$GLB,,, | Performed by: INTERNAL MEDICINE

## 2017-07-06 PROCEDURE — 82570 ASSAY OF URINE CREATININE: CPT

## 2017-07-06 PROCEDURE — 99499 UNLISTED E&M SERVICE: CPT | Mod: S$GLB,,, | Performed by: INTERNAL MEDICINE

## 2017-07-06 PROCEDURE — 3046F HEMOGLOBIN A1C LEVEL >9.0%: CPT | Mod: S$GLB,,, | Performed by: INTERNAL MEDICINE

## 2017-07-06 PROCEDURE — 99999 PR PBB SHADOW E&M-EST. PATIENT-LVL V: CPT | Mod: PBBFAC,,, | Performed by: INTERNAL MEDICINE

## 2017-07-06 PROCEDURE — 1159F MED LIST DOCD IN RCRD: CPT | Mod: S$GLB,,, | Performed by: INTERNAL MEDICINE

## 2017-07-06 PROCEDURE — 1126F AMNT PAIN NOTED NONE PRSNT: CPT | Mod: S$GLB,,, | Performed by: INTERNAL MEDICINE

## 2017-07-09 ENCOUNTER — TELEPHONE (OUTPATIENT)
Dept: INTERNAL MEDICINE | Facility: CLINIC | Age: 72
End: 2017-07-09

## 2017-07-10 NOTE — TELEPHONE ENCOUNTER
A1C is over 10 so patient must be referred to Diabetes Education. Please make an appt she can make using the [employer] transport system. GML

## 2017-07-13 ENCOUNTER — TELEPHONE (OUTPATIENT)
Dept: DIABETES | Facility: CLINIC | Age: 72
End: 2017-07-13

## 2017-07-13 NOTE — TELEPHONE ENCOUNTER
Received order for Diabetes education.  Left message for pt to return call to schedule.  Contact information provided.

## 2017-07-14 ENCOUNTER — OFFICE VISIT (OUTPATIENT)
Dept: WOUND CARE | Facility: CLINIC | Age: 72
End: 2017-07-14
Payer: MEDICARE

## 2017-07-14 VITALS
SYSTOLIC BLOOD PRESSURE: 145 MMHG | HEIGHT: 67 IN | HEART RATE: 69 BPM | TEMPERATURE: 97 F | DIASTOLIC BLOOD PRESSURE: 58 MMHG

## 2017-07-14 DIAGNOSIS — I87.2 VENOUS STASIS DERMATITIS OF BOTH LOWER EXTREMITIES: ICD-10-CM

## 2017-07-14 DIAGNOSIS — B35.3 TINEA PEDIS OF BOTH FEET: Chronic | ICD-10-CM

## 2017-07-14 DIAGNOSIS — L97.921 ULCER OF LEFT LOWER EXTREMITY, LIMITED TO BREAKDOWN OF SKIN: Primary | ICD-10-CM

## 2017-07-14 DIAGNOSIS — E88.09 HYPOALBUMINEMIA: ICD-10-CM

## 2017-07-14 DIAGNOSIS — I87.2 VENOUS INSUFFICIENCY OF BOTH LOWER EXTREMITIES: ICD-10-CM

## 2017-07-14 DIAGNOSIS — R60.0 BILATERAL LEG EDEMA: ICD-10-CM

## 2017-07-14 DIAGNOSIS — L97.912 ULCER OF LOWER LIMB, RIGHT, WITH FAT LAYER EXPOSED: ICD-10-CM

## 2017-07-14 DIAGNOSIS — L97.512 ULCER OF TOE, RIGHT, WITH FAT LAYER EXPOSED: ICD-10-CM

## 2017-07-14 PROCEDURE — 99499 UNLISTED E&M SERVICE: CPT | Mod: S$GLB,,, | Performed by: NURSE PRACTITIONER

## 2017-07-14 PROCEDURE — 99999 PR PBB SHADOW E&M-EST. PATIENT-LVL IV: CPT | Mod: PBBFAC,,, | Performed by: NURSE PRACTITIONER

## 2017-07-14 PROCEDURE — 29580 STRAPPING UNNA BOOT: CPT | Mod: 50,S$GLB,, | Performed by: NURSE PRACTITIONER

## 2017-07-14 NOTE — PROGRESS NOTES
Subjective:       Patient ID: Sherin Ortiz is a 72 y.o. female.    Chief Complaint: Wound Check    Wound Check     Wound Check:   This patient is seen today for reevaluation of wounds to bilateral lower legs and the right second toe. An unna boot was on the legs but she removed it before coming to clinic today to shower.  The wound are healing as evidenced by wound contracture.  However there is a new wound to the left lateral leg.  Problems that interfere with wound healing include multiple co-morbidities, diabetes, edema, diabetic neuropathy and  morbid obesity. The patient is afebrile. The patient ambulates with great difficulty secondary to her body habitus.  She has no pain.  She denies increased swelling, redness or purulent drainage.   She is afebrile.   Review of Systems   Constitutional: Negative for chills, diaphoresis and fever.   HENT: Negative for hearing loss, postnasal drip, rhinorrhea, sinus pressure, sneezing, sore throat, tinnitus and trouble swallowing.    Eyes: Negative for visual disturbance.   Respiratory: Negative for apnea, cough, shortness of breath and wheezing.    Cardiovascular: Positive for leg swelling. Negative for chest pain and palpitations.   Gastrointestinal: Negative for constipation, diarrhea, nausea and vomiting.   Genitourinary: Negative for difficulty urinating, dysuria, frequency and hematuria.   Musculoskeletal: Negative for arthralgias, back pain and joint swelling.   Skin: Positive for wound.   Neurological: Negative for dizziness, weakness, light-headedness and headaches.   Hematological: Does not bruise/bleed easily.   Psychiatric/Behavioral: Negative for confusion, decreased concentration, dysphoric mood and sleep disturbance. The patient is not nervous/anxious.    All other systems reviewed and are negative.      Objective:      Physical Exam   Constitutional: She is oriented to person, place, and time. No distress.   Morbidly obese   HENT:   Head: Normocephalic  and atraumatic.   Neck: Normal range of motion. Neck supple.   Pulmonary/Chest: Effort normal.   Musculoskeletal: Normal range of motion. She exhibits edema. She exhibits no tenderness.        Legs:       Feet:    Neurological: She is alert and oriented to person, place, and time.   Skin: Skin is warm and dry. Rash (dermatitis and tinea bilateral lower extremities) noted. She is not diaphoretic. No cyanosis or erythema. No pallor. Nails show no clubbing.   Psychiatric: She has a normal mood and affect. Her behavior is normal. Judgment and thought content normal.   Nursing note and vitals reviewed.    ..  Hemoglobin A1C   Date Value Ref Range Status   07/06/2017 10.0 (H) 4.0 - 5.6 % Final     Comment:     According to ADA guidelines, hemoglobin A1c <7.0% represents  optimal control in non-pregnant diabetic patients. Different  metrics may apply to specific patient populations.   Standards of Medical Care in Diabetes-2016.  For the purpose of screening for the presence of diabetes:  <5.7%     Consistent with the absence of diabetes  5.7-6.4%  Consistent with increasing risk for diabetes   (prediabetes)  >or=6.5%  Consistent with diabetes  Currently, no consensus exists for use of hemoglobin A1c  for diagnosis of diabetes for children.  This Hemoglobin A1c assay has significant interference with fetal   hemoglobin   (HbF). The results are invalid for patients with abnormal amounts of   HbF,   including those with known Hereditary Persistence   of Fetal Hemoglobin. Heterozygous hemoglobin variants (HbAS, HbAC,   HbAD, HbAE, HbA2) do not significantly interfere with this assay;   however, presence of multiple variants in a sample may impact the %   interference.     03/08/2017 9.6 (H) 4.5 - 6.2 % Final     Comment:     According to ADA guidelines, hemoglobin A1C <7.0% represents  optimal control in non-pregnant diabetic patients.  Different  metrics may apply to specific populations.   Standards of Medical Care in  Diabetes - 2016.  For the purpose of screening for the presence of diabetes:  <5.7%     Consistent with the absence of diabetes  5.7-6.4%  Consistent with increasing risk for diabetes   (prediabetes)  >or=6.5%  Consistent with diabetes  Currently no consensus exists for use of hemoglobin A1C  for diagnosis of diabetes for children.     11/08/2016 9.9 (H) 4.5 - 6.2 % Final     Comment:     According to ADA guidelines, hemoglobin A1C <7.0% represents  optimal control in non-pregnant diabetic patients.  Different  metrics may apply to specific populations.   Standards of Medical Care in Diabetes - 2016.  For the purpose of screening for the presence of diabetes:  <5.7%     Consistent with the absence of diabetes  5.7-6.4%  Consistent with increasing risk for diabetes   (prediabetes)  >or=6.5%  Consistent with diabetes  Currently no consensus exists for use of hemoglobin A1C  for diagnosis of diabetes for children.       ..  Lab Results   Component Value Date    ALBUMIN 3.2 (L) 07/06/2017     Sherin was seen in the clinic room and placed in the supine position on the treatment table.  Both lower legs were cleansed with Easi-clense sponges and dried thoroughly.  Medihoney gel and a hydrofiber dressing was applied to the bilateral leg wounds.  Eucerin cream was applied to the lower legs.  The ankles were padded with ABD pads.  The patient's feet was positioned at a 90 degree angle.  A zinc oxide wrap, followed by kerlix roll gauze and coban were applied using a spiral technique avoiding creases or folds.  The wraps were started behind the first metatarsal and ended below the tibial tubercle of the knee.  There was overlap of each turn half the width of the previous turn.  The compression wraps will be changed every 3-4 days.      Assessment:       1. Ulcer of left lower extremity, limited to breakdown of skin    2. Ulcer of lower limb, right, with fat layer exposed    3. Ulcer of toe, right, with fat layer exposed    4.  Venous insufficiency of both lower extremities    5. Uncontrolled type 2 diabetes mellitus with complication, with long-term current use of insulin    6. Tinea pedis of both feet    7. Venous stasis dermatitis of both lower extremities    8. Hypoalbuminemia    9. Bilateral leg edema        Plan:           Spectazole cream to feet and toes.  Triamcinolone cream to lower legs.  Unna boot bilateral lower legs as detailed above.  Patient was warned not to get the dressings wet and to use cast covers for showering.  Should the dressing become wet, she is to remove it, place a wet-to-dry dressing over the wound, cover with gauze and roll gauze and use ace wraps for compression and to secure bandages.  She should then notify home health as soon as possible to have a new dressing applied.  Apply medihoney gel and hydrofiber dressing to bilateral foot and toe ulcers, cotton in between toes, cover with gauze and secure with roll gauze.  Change dressing twice weekly.  Return to clinic in 2 weeks.  [facility] notified of orders via email.      Home Health Wound Care Orders.  Cleanse wounds with wound .  Spectazole cream to feet and toes.  Triamcinolone cream to lower legs.  Medihoney gel and hydrofiber dressing to bilateral leg wounds.  ABD pad to both ankles and right shin.  Unna boot (zinc oxide, kerlix and coban) bilateral lower legs.  Medihoney gel and hydrofiber to right foot and toe ulcers, cotton in between toes, cover right foot wound with gauze and secure with roll gauze.  Change toe dressing twice weekly.  Change leg wraps twice weekly.    Right medial leg      Right lateral leg      Left medial leg     Left lateral leg      Right second toe and dorsal foot      Left  foot

## 2017-07-26 ENCOUNTER — OFFICE VISIT (OUTPATIENT)
Dept: NEPHROLOGY | Facility: CLINIC | Age: 72
End: 2017-07-26
Payer: MEDICARE

## 2017-07-26 VITALS
HEIGHT: 67 IN | OXYGEN SATURATION: 96 % | HEART RATE: 60 BPM | DIASTOLIC BLOOD PRESSURE: 80 MMHG | SYSTOLIC BLOOD PRESSURE: 150 MMHG

## 2017-07-26 DIAGNOSIS — N18.30 STAGE 3 CHRONIC KIDNEY DISEASE DUE TO TYPE 2 DIABETES MELLITUS: Primary | Chronic | ICD-10-CM

## 2017-07-26 DIAGNOSIS — E21.3 HYPERPARATHYROIDISM: ICD-10-CM

## 2017-07-26 DIAGNOSIS — E66.01 MORBID OBESITY WITH BMI OF 40.0-44.9, ADULT: ICD-10-CM

## 2017-07-26 DIAGNOSIS — E11.22 STAGE 3 CHRONIC KIDNEY DISEASE DUE TO TYPE 2 DIABETES MELLITUS: Primary | Chronic | ICD-10-CM

## 2017-07-26 DIAGNOSIS — N39.0 UTI (URINARY TRACT INFECTION), UNCOMPLICATED: ICD-10-CM

## 2017-07-26 DIAGNOSIS — D63.8 ANEMIA OF CHRONIC DISEASE: ICD-10-CM

## 2017-07-26 PROCEDURE — 99214 OFFICE O/P EST MOD 30 MIN: CPT | Mod: S$GLB,,, | Performed by: INTERNAL MEDICINE

## 2017-07-26 PROCEDURE — 3066F NEPHROPATHY DOC TX: CPT | Mod: S$GLB,,, | Performed by: INTERNAL MEDICINE

## 2017-07-26 PROCEDURE — 1159F MED LIST DOCD IN RCRD: CPT | Mod: S$GLB,,, | Performed by: INTERNAL MEDICINE

## 2017-07-26 PROCEDURE — 3046F HEMOGLOBIN A1C LEVEL >9.0%: CPT | Mod: S$GLB,,, | Performed by: INTERNAL MEDICINE

## 2017-07-26 PROCEDURE — 1126F AMNT PAIN NOTED NONE PRSNT: CPT | Mod: S$GLB,,, | Performed by: INTERNAL MEDICINE

## 2017-07-26 PROCEDURE — 99499 UNLISTED E&M SERVICE: CPT | Mod: S$GLB,,, | Performed by: INTERNAL MEDICINE

## 2017-07-26 PROCEDURE — 99999 PR PBB SHADOW E&M-EST. PATIENT-LVL III: CPT | Mod: PBBFAC,,, | Performed by: INTERNAL MEDICINE

## 2017-07-26 RX ORDER — ERGOCALCIFEROL 1.25 MG/1
50000 CAPSULE ORAL
Qty: 12 CAPSULE | Refills: 3 | Status: SHIPPED | OUTPATIENT
Start: 2017-07-26 | End: 2018-05-14 | Stop reason: SDUPTHER

## 2017-07-26 RX ORDER — HYDROCHLOROTHIAZIDE 25 MG/1
25 TABLET ORAL DAILY
Qty: 30 TABLET | Refills: 3 | Status: ON HOLD | OUTPATIENT
Start: 2017-07-26 | End: 2018-01-08

## 2017-07-26 NOTE — PROGRESS NOTES
,    Subjective:       Patient ID: Sherin Ortiz is a 72 y.o. White female who presents for new evaluation of CKD  The patient has a history of uncontrolled DM x 50 yrs with diabetic neuropathy and proliferative diabetic retinopathy (laser surgery), morbid obesity, HTN, diastolic dysfunction.   The patient has no family history of kidney disease. She also has a hx of kidney stones, she had an episode in January 2017 with right sided hydronephrosis and intervention (passend when stent was placed). The patient does not freuqently use NSAIDS or herbal supplements. Has not been to the eye doctor in a while. She had problems with her balance, she has bilateral leg ulcer and has her legs chronically rapped. She is not moving out of her wheelchair. She still takes care of her sister who is bedridden.  No more stone episodes, is not following a low salt diet.    HPI  Review of Systems   Constitutional: Negative for activity change, appetite change, chills, fatigue, fever and unexpected weight change.   HENT: Negative.  Negative for nosebleeds.    Eyes: Negative.    Respiratory: Negative.  Negative for cough, chest tightness and shortness of breath.    Cardiovascular: Negative.  Negative for chest pain and leg swelling.   Gastrointestinal: Negative for abdominal pain, anal bleeding, diarrhea, nausea and vomiting.   Genitourinary: Negative for decreased urine volume, difficulty urinating, dysuria, flank pain, frequency, hematuria, pelvic pain, urgency and vaginal bleeding.        Nocturia 3-4 times   Musculoskeletal: Negative.  Negative for joint swelling and myalgias.   Skin: Negative.  Negative for rash.   Neurological: Positive for numbness (feet).   Psychiatric/Behavioral: Negative.    All other systems reviewed and are negative.      Objective:      Physical Exam   Constitutional: She is oriented to person, place, and time. She appears well-developed and well-nourished. No distress.   HENT:   Head: Normocephalic and  atraumatic.   Right Ear: External ear normal.   Left Ear: External ear normal.   Nose: Nose normal.   Mouth/Throat: Oropharynx is clear and moist.   Eyes: Conjunctivae and EOM are normal. Pupils are equal, round, and reactive to light.   Neck: Normal range of motion. Neck supple. No thyromegaly present.   Cardiovascular: Normal rate, regular rhythm, normal heart sounds and intact distal pulses.    Pulmonary/Chest: Effort normal and breath sounds normal. No respiratory distress. She has no wheezes. She has no rales. She exhibits no tenderness.   Abdominal: Soft. Normal appearance and bowel sounds are normal. She exhibits no distension. There is no tenderness. There is no rebound and no guarding.   Musculoskeletal: Normal range of motion. She exhibits no edema or tenderness.   Neurological: She is alert and oriented to person, place, and time. She has normal reflexes.   Skin: Skin is warm, dry and intact. She is not diaphoretic.   Psychiatric: She has a normal mood and affect. Her behavior is normal. Judgment and thought content normal.   Nursing note and vitals reviewed.      Assessment:       1. Stage 3 chronic kidney disease due to type 2 diabetes mellitus    2. Hyperparathyroidism    3. Morbid obesity with BMI of 40.0-44.9, adult    4. Anemia of chronic disease    5. UTI (urinary tract infection), uncomplicated        Plan:       1. CKD3: Stable,mMost likely secondary to diabetic nephropathy (also has retinopathy and neuropathy.   We will send the patient for a renal US, SPEP/YRIS, UA and protein/creatinine ratio and uric acid level.  - on low dose lisinopril (can not increase because of high potassium)  - on a statin     Advised to follow a low salt and low potassium diet.   - on thiazide  - again if recurrent stones will send for stone profile.     Lab Results   Component Value Date    CREATININE 1.5 (H) 07/06/2017    CREATININE 1.5 (H) 07/06/2017     Protein Creatinine Ratios:    Prot/Creat Ratio, Ur   Date  Value Ref Range Status   07/06/2017 0.15 0.00 - 0.20 Final   05/05/2017 0.44 (H) 0.00 - 0.20 Final   04/12/2017 0.18 0.00 - 0.20 Final     ·   ·   Acid-Base:  Hyperkalemia in the past, better now on thiazide. 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 HTN: Blood pressures not well controlled. 155/80. Will start her on a thiazide diuretic, low dose also to help with the potassium.      3. Renal osteodystrophy: last PTH - continue ergocalciferol weekly  Lab Results   Component Value Date    PTH 93.0 (H) 07/06/2017    CALCIUM 10.0 07/06/2017    CALCIUM 10.0 07/06/2017    CAION 1.22 12/15/2012    PHOS 3.3 07/06/2017       4. Anemia: stable  Lab Results   Component Value Date    HGB 11.5 (L) 07/06/2017        5. DM:  Last HbA1C not within target.   Lab Results   Component Value Date    HGBA1C 10.0 (H) 07/06/2017       6. Lipid management:   Lab Results   Component Value Date    LDLCALC 69.0 03/08/2017        Follow up in 6 month with labs, urine culture today

## 2017-08-04 ENCOUNTER — OFFICE VISIT (OUTPATIENT)
Dept: WOUND CARE | Facility: CLINIC | Age: 72
End: 2017-08-04
Payer: MEDICARE

## 2017-08-04 ENCOUNTER — TELEPHONE (OUTPATIENT)
Dept: INTERNAL MEDICINE | Facility: CLINIC | Age: 72
End: 2017-08-04

## 2017-08-04 VITALS
TEMPERATURE: 97 F | HEIGHT: 67 IN | DIASTOLIC BLOOD PRESSURE: 89 MMHG | HEART RATE: 82 BPM | WEIGHT: 293 LBS | BODY MASS INDEX: 45.99 KG/M2 | SYSTOLIC BLOOD PRESSURE: 133 MMHG

## 2017-08-04 DIAGNOSIS — L97.912 ULCER OF LOWER LIMB, RIGHT, WITH FAT LAYER EXPOSED: Primary | ICD-10-CM

## 2017-08-04 DIAGNOSIS — L97.921 ULCER OF LEFT LOWER EXTREMITY, LIMITED TO BREAKDOWN OF SKIN: ICD-10-CM

## 2017-08-04 DIAGNOSIS — I87.2 VENOUS INSUFFICIENCY OF BOTH LOWER EXTREMITIES: ICD-10-CM

## 2017-08-04 DIAGNOSIS — R60.0 BILATERAL LEG EDEMA: ICD-10-CM

## 2017-08-04 DIAGNOSIS — I87.2 VENOUS STASIS DERMATITIS OF BOTH LOWER EXTREMITIES: ICD-10-CM

## 2017-08-04 DIAGNOSIS — L60.3 DYSTROPHIC NAIL: ICD-10-CM

## 2017-08-04 DIAGNOSIS — B35.3 TINEA PEDIS OF BOTH FEET: Chronic | ICD-10-CM

## 2017-08-04 DIAGNOSIS — L97.512 ULCER OF TOE, RIGHT, WITH FAT LAYER EXPOSED: ICD-10-CM

## 2017-08-04 PROCEDURE — 29580 STRAPPING UNNA BOOT: CPT | Mod: 50,S$GLB,, | Performed by: NURSE PRACTITIONER

## 2017-08-04 PROCEDURE — 99499 UNLISTED E&M SERVICE: CPT | Mod: S$GLB,,, | Performed by: NURSE PRACTITIONER

## 2017-08-04 PROCEDURE — 99999 PR PBB SHADOW E&M-EST. PATIENT-LVL V: CPT | Mod: PBBFAC,,, | Performed by: NURSE PRACTITIONER

## 2017-08-04 NOTE — Clinical Note
She has been refusing to weigh but her weight today is 24 pounds more than her weight on 6/23.  Also she is not willing to see the dietician because she says that she knows what to do but is just not doing it.  Her legs are a real challenge as a result and I wanted to let you know. Yumiko

## 2017-08-04 NOTE — TELEPHONE ENCOUNTER
----- Message from Jose Luis Hansen sent at 8/4/2017  2:13 PM CDT -----  Contact:  [facility] ext [phone]  Requesting a call back from your assistant ASAP regarding the above pt, stated the pt is in her office rt now, advice    Thanks

## 2017-08-04 NOTE — TELEPHONE ENCOUNTER
Spoke to Yumiko from wound care, states she is seeing the patient today and patient has gained 24lbs over the last 5 weeks. Will send over reports.

## 2017-08-04 NOTE — PROGRESS NOTES
Subjective:       Patient ID: Sherin Ortiz is a 72 y.o. female.    Chief Complaint: Wound Check    Wound Check     Wound Check:   This patient is seen today for reevaluation of wounds to bilateral lower legs and the right second toe. An unna boot was on the legs but she removed it before coming to clinic today to shower.  She has had a 24 pound weight gain since her visit on 6/23/17.  Her wounds have been difficult to heal as a result.  Problems that interfere with wound healing include multiple co-morbidities, diabetes, edema, diabetic neuropathy and  morbid obesity. The patient is afebrile. The patient ambulates with great difficulty secondary to her body habitus.  She has no pain.  She denies increased swelling, redness or purulent drainage.   She is afebrile.   Review of Systems   Constitutional: Positive for unexpected weight change (24 pound weight gain since last visit 6/23/17). Negative for chills, diaphoresis and fever.   HENT: Negative for hearing loss, postnasal drip, rhinorrhea, sinus pressure, sneezing, sore throat, tinnitus and trouble swallowing.    Eyes: Negative for visual disturbance.   Respiratory: Negative for apnea, cough, shortness of breath and wheezing.    Cardiovascular: Positive for leg swelling. Negative for chest pain and palpitations.   Gastrointestinal: Negative for constipation, diarrhea, nausea and vomiting.   Genitourinary: Negative for difficulty urinating, dysuria, frequency and hematuria.   Musculoskeletal: Negative for arthralgias, back pain and joint swelling.   Skin: Positive for wound.   Neurological: Negative for dizziness, weakness, light-headedness and headaches.   Hematological: Does not bruise/bleed easily.   Psychiatric/Behavioral: Negative for confusion, decreased concentration, dysphoric mood and sleep disturbance. The patient is not nervous/anxious.    All other systems reviewed and are negative.      Objective:      Physical Exam   Constitutional: She is  oriented to person, place, and time. No distress.   Morbidly obese   HENT:   Head: Normocephalic and atraumatic.   Neck: Normal range of motion. Neck supple.   Pulmonary/Chest: Effort normal.   Musculoskeletal: Normal range of motion. She exhibits edema. She exhibits no tenderness.        Legs:       Feet:    Neurological: She is alert and oriented to person, place, and time.   Skin: Skin is warm and dry. Rash (dermatitis and tinea bilateral lower extremities) noted. She is not diaphoretic. No cyanosis or erythema. No pallor. Nails show no clubbing.   Psychiatric: She has a normal mood and affect. Her behavior is normal. Judgment and thought content normal.   Nursing note and vitals reviewed.    ..  Hemoglobin A1C   Date Value Ref Range Status   07/06/2017 10.0 (H) 4.0 - 5.6 % Final     Comment:     According to ADA guidelines, hemoglobin A1c <7.0% represents  optimal control in non-pregnant diabetic patients. Different  metrics may apply to specific patient populations.   Standards of Medical Care in Diabetes-2016.  For the purpose of screening for the presence of diabetes:  <5.7%     Consistent with the absence of diabetes  5.7-6.4%  Consistent with increasing risk for diabetes   (prediabetes)  >or=6.5%  Consistent with diabetes  Currently, no consensus exists for use of hemoglobin A1c  for diagnosis of diabetes for children.  This Hemoglobin A1c assay has significant interference with fetal   hemoglobin   (HbF). The results are invalid for patients with abnormal amounts of   HbF,   including those with known Hereditary Persistence   of Fetal Hemoglobin. Heterozygous hemoglobin variants (HbAS, HbAC,   HbAD, HbAE, HbA2) do not significantly interfere with this assay;   however, presence of multiple variants in a sample may impact the %   interference.     03/08/2017 9.6 (H) 4.5 - 6.2 % Final     Comment:     According to ADA guidelines, hemoglobin A1C <7.0% represents  optimal control in non-pregnant diabetic  patients.  Different  metrics may apply to specific populations.   Standards of Medical Care in Diabetes - 2016.  For the purpose of screening for the presence of diabetes:  <5.7%     Consistent with the absence of diabetes  5.7-6.4%  Consistent with increasing risk for diabetes   (prediabetes)  >or=6.5%  Consistent with diabetes  Currently no consensus exists for use of hemoglobin A1C  for diagnosis of diabetes for children.     11/08/2016 9.9 (H) 4.5 - 6.2 % Final     Comment:     According to ADA guidelines, hemoglobin A1C <7.0% represents  optimal control in non-pregnant diabetic patients.  Different  metrics may apply to specific populations.   Standards of Medical Care in Diabetes - 2016.  For the purpose of screening for the presence of diabetes:  <5.7%     Consistent with the absence of diabetes  5.7-6.4%  Consistent with increasing risk for diabetes   (prediabetes)  >or=6.5%  Consistent with diabetes  Currently no consensus exists for use of hemoglobin A1C  for diagnosis of diabetes for children.       ..  Lab Results   Component Value Date    ALBUMIN 3.2 (L) 07/06/2017     Sherin was seen in the clinic room and placed in the supine position on the treatment table.  Both lower legs were cleansed with Easi-clense sponges and dried thoroughly.  Medihoney gel and a hydrofiber dressing was applied to the bilateral leg wounds.  Eucerin cream was applied to the lower legs.  The ankles were padded with ABD pads.  The patient's feet was positioned at a 90 degree angle.  A zinc oxide wrap, followed by kerlix roll gauze and coban were applied using a spiral technique avoiding creases or folds.  The wraps were started behind the first metatarsal and ended below the tibial tubercle of the knee.  There was overlap of each turn half the width of the previous turn.  The compression wraps will be changed every 3-4 days.      Assessment:       1. Ulcer of lower limb, right, with fat layer exposed    2. Ulcer of left lower  extremity, limited to breakdown of skin    3. Ulcer of toe, right, with fat layer exposed    4. Chronic venous hypertension with ulcer involving both sides    5. Tinea pedis of both feet    6. Venous stasis dermatitis of both lower extremities    7. Bilateral leg edema    8. Dystrophic nail        Plan:           Consult podiatry for nail reduction.  Spectazole cream to feet and toes.  Triamcinolone cream to lower legs.  Unna boot bilateral lower legs as detailed above.  Patient was warned not to get the dressings wet and to use cast covers for showering.  Should the dressing become wet, she is to remove it, place a wet-to-dry dressing over the wound, cover with gauze and roll gauze and use ace wraps for compression and to secure bandages.  She should then notify home health as soon as possible to have a new dressing applied.  Apply medihoney gel and hydrofiber dressing to bilateral foot and toe ulcers, cotton in between toes, cover with gauze and secure with roll gauze.  Change dressing twice weekly.  Return to clinic in 2 weeks.  [facility] notified of orders via email.      Home Health Wound Care Orders.  Cleanse wounds with wound .  Spectazole cream to feet and toes.  Triamcinolone cream to lower legs.  Medihoney gel and hydrofiber dressing to bilateral leg wounds.  ABD pad to both ankles and right shin.  Unna boot (zinc oxide, kerlix and coban) bilateral lower legs.  Medihoney gel and hydrofiber to right foot and toe ulcers, cotton in between toes, cover right foot wound with gauze and secure with roll gauze.  Change toe dressing twice weekly.  Change leg wraps twice weekly.    Right medial leg      Right lateral leg      Left medial leg      Left lateral leg      Right second toe and dorsal foot      Left  foot

## 2017-08-09 ENCOUNTER — HOSPITAL ENCOUNTER (OUTPATIENT)
Dept: RADIOLOGY | Facility: HOSPITAL | Age: 72
Discharge: HOME | End: 2017-08-09
Attending: INTERNAL MEDICINE
Payer: MEDICARE

## 2017-08-09 DIAGNOSIS — N18.30 STAGE 3 CHRONIC KIDNEY DISEASE DUE TO TYPE 2 DIABETES MELLITUS: Chronic | ICD-10-CM

## 2017-08-09 DIAGNOSIS — E11.22 STAGE 3 CHRONIC KIDNEY DISEASE DUE TO TYPE 2 DIABETES MELLITUS: Chronic | ICD-10-CM

## 2017-08-09 PROCEDURE — 76770 US EXAM ABDO BACK WALL COMP: CPT | Mod: 26,,, | Performed by: RADIOLOGY

## 2017-08-09 PROCEDURE — 76770 US EXAM ABDO BACK WALL COMP: CPT | Mod: TC

## 2017-08-10 ENCOUNTER — TELEPHONE (OUTPATIENT)
Dept: WOUND CARE | Facility: CLINIC | Age: 72
End: 2017-08-10

## 2017-08-10 NOTE — TELEPHONE ENCOUNTER
----- Message from Lauren Vu sent at 8/10/2017  4:05 PM CDT -----  Contact: cary/ [facility] [phone]  Artemioelsa States that she needs a call returned by a nurse in reference to the pt using a different medicine for her feet called clotrimazole vs her prescription, Cary wants to know if its ok the patient using this medicine . Please call Cary @  [phone] .                                                       Thanks :)

## 2017-08-11 ENCOUNTER — TELEPHONE (OUTPATIENT)
Dept: NEPHROLOGY | Facility: CLINIC | Age: 72
End: 2017-08-11

## 2017-08-11 NOTE — TELEPHONE ENCOUNTER
----- Message from Cynthia Choi MD sent at 8/9/2017 11:26 AM CDT -----  Please make sure she gets the urine culture done today. She had the urine done!      8/9/2017 done

## 2017-08-14 ENCOUNTER — OFFICE VISIT (OUTPATIENT)
Dept: PODIATRY | Facility: CLINIC | Age: 72
End: 2017-08-14
Payer: MEDICARE

## 2017-08-14 VITALS
DIASTOLIC BLOOD PRESSURE: 68 MMHG | HEART RATE: 71 BPM | SYSTOLIC BLOOD PRESSURE: 132 MMHG | BODY MASS INDEX: 45.99 KG/M2 | WEIGHT: 293 LBS | HEIGHT: 67 IN

## 2017-08-14 DIAGNOSIS — B35.3 TINEA PEDIS OF BOTH FEET: Chronic | ICD-10-CM

## 2017-08-14 DIAGNOSIS — R60.0 BILATERAL LEG EDEMA: ICD-10-CM

## 2017-08-14 DIAGNOSIS — L60.3 DYSTROPHIC NAIL: ICD-10-CM

## 2017-08-14 DIAGNOSIS — I87.2 VENOUS INSUFFICIENCY OF BOTH LOWER EXTREMITIES: ICD-10-CM

## 2017-08-14 DIAGNOSIS — E11.9 ENCOUNTER FOR COMPREHENSIVE DIABETIC FOOT EXAMINATION, TYPE 2 DIABETES MELLITUS: ICD-10-CM

## 2017-08-14 DIAGNOSIS — Z99.3 WHEELCHAIR DEPENDENT: Chronic | ICD-10-CM

## 2017-08-14 PROCEDURE — 3078F DIAST BP <80 MM HG: CPT | Mod: S$GLB,,, | Performed by: PODIATRIST

## 2017-08-14 PROCEDURE — 11721 DEBRIDE NAIL 6 OR MORE: CPT | Mod: Q9,S$GLB,, | Performed by: PODIATRIST

## 2017-08-14 PROCEDURE — 3075F SYST BP GE 130 - 139MM HG: CPT | Mod: S$GLB,,, | Performed by: PODIATRIST

## 2017-08-14 PROCEDURE — 3046F HEMOGLOBIN A1C LEVEL >9.0%: CPT | Mod: S$GLB,,, | Performed by: PODIATRIST

## 2017-08-14 PROCEDURE — 1159F MED LIST DOCD IN RCRD: CPT | Mod: S$GLB,,, | Performed by: PODIATRIST

## 2017-08-14 PROCEDURE — 99999 PR PBB SHADOW E&M-EST. PATIENT-LVL III: CPT | Mod: PBBFAC,,, | Performed by: PODIATRIST

## 2017-08-14 PROCEDURE — 1126F AMNT PAIN NOTED NONE PRSNT: CPT | Mod: S$GLB,,, | Performed by: PODIATRIST

## 2017-08-14 PROCEDURE — 3066F NEPHROPATHY DOC TX: CPT | Mod: S$GLB,,, | Performed by: PODIATRIST

## 2017-08-14 PROCEDURE — 99213 OFFICE O/P EST LOW 20 MIN: CPT | Mod: 25,S$GLB,, | Performed by: PODIATRIST

## 2017-08-14 PROCEDURE — 99499 UNLISTED E&M SERVICE: CPT | Mod: S$GLB,,, | Performed by: PODIATRIST

## 2017-08-14 PROCEDURE — 3008F BODY MASS INDEX DOCD: CPT | Mod: S$GLB,,, | Performed by: PODIATRIST

## 2017-08-14 NOTE — LETTER
August 14, 2017      Ame Vasquez MD  [address]  [place] [address]           [facility] - Podiatry  [address]  [place] [address]  Phone: [phone]          Patient: Sherin Ortiz   MR Number: [id number]   YOB: 1945   Date of Visit: 8/14/2017       Dear Dr. Ame Vasquez:    Thank you for referring Sherin Ortiz to me for evaluation. Attached you will find relevant portions of my assessment and plan of care.    If you have questions, please do not hesitate to call me. I look forward to following Sherin Ortiz along with you.    Sincerely,    Reinier Ward DPM    Enclosure  CC:  No Recipients    If you would like to receive this communication electronically, please contact [email or web address] or [phone] to request more information on [employer] access.    For providers and/or their staff who would like to refer a patient to [facility], please contact us through our one-stop-shop provider referral line, [facility], [place], at [phone].    If you feel you have received this communication in error or would no longer like to receive these types of communications, please e-mail [email or web address]

## 2017-08-14 NOTE — PROGRESS NOTES
Subjective:      Patient ID: Sherin Ortiz is a 72 y.o. female.    Chief Complaint: Diabetes Mellitus (PCP Dr. Vasquez 7/6/17); Diabetic Foot Exam; Routine Foot Care; and Foot Ulcer (seing Yumiko Alvarenga with that)    Sherin is a 72 y.o. female who presents to the clinic upon referral from Dr. Vasquez  for evaluation and treatment of diabetic feet. 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/2013); Benign essential hypertension (11/14/2012); Cataract; Chronic kidney disease (CKD), stage III (moderate) (9/12/2013); Diabetic peripheral neuropathy associated with type 2 diabetes mellitus (11/14/2014); Gait disorder; Hyperlipidemia; Iritis - Both Eyes (6/10/2013); Kidney stone; Lymphedema; Morbid obesity with BMI of 40.0-44.9, adult (2/18/2015); NS (nuclear sclerosis) (4/1/2013); Nuclear sclerosis - Both Eyes (4/29/2013); Preseptal cellulitis - Right Eye (4/29/2013); Proliferative diabetic retinopathy - Both Eyes (4/29/2013); Proliferative diabetic retinopathy, both eyes (4/1/2013); PSC (posterior subcapsular cataract) - Both Eyes (4/29/2013); S/P hernia repair (12/19/2012); TIA (transient ischemic attack) (11/18/2014); Tinea pedis (7/24/2012); Type 2 diabetes mellitus with renal manifestations, controlled (12/12/2013); Type 2 diabetes, controlled, with moderate nonproliferative diabetic retinopathy without macular edema (9/17/2015); Ulcer of left lower extremity, limited to breakdown of skin (7/8/2015); Unspecified cerebral artery occlusion with cerebral infarction (11/16/2014); Unspecified venous (peripheral) insufficiency; UTI (lower urinary tract infection); Vaginal infection; and 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 Patient relates no major problem with feet. Only complaints today consist of thick, fungal nails, bialt. Leg wounds are wrapped and under care of Yumiko Alvarenga. No other concerns.     PCP: Ame Vasquez MD    Date Last Seen by PCP:   Chief Complaint   Patient  presents with    Diabetes Mellitus     PCP Dr. Vasquez 7/6/17    Diabetic Foot Exam    Routine Foot Care    Foot Ulcer     seing Yumiko Alvarenga with that           Hemoglobin A1C   Date Value Ref Range Status   07/06/2017 10.0 (H) 4.0 - 5.6 % Final     Comment:     According to ADA guidelines, hemoglobin A1c <7.0% represents  optimal control in non-pregnant diabetic patients. Different  metrics may apply to specific patient populations.   Standards of Medical Care in Diabetes-2016.  For the purpose of screening for the presence of diabetes:  <5.7%     Consistent with the absence of diabetes  5.7-6.4%  Consistent with increasing risk for diabetes   (prediabetes)  >or=6.5%  Consistent with diabetes  Currently, no consensus exists for use of hemoglobin A1c  for diagnosis of diabetes for children.  This Hemoglobin A1c assay has significant interference with fetal   hemoglobin   (HbF). The results are invalid for patients with abnormal amounts of   HbF,   including those with known Hereditary Persistence   of Fetal Hemoglobin. Heterozygous hemoglobin variants (HbAS, HbAC,   HbAD, HbAE, HbA2) do not significantly interfere with this assay;   however, presence of multiple variants in a sample may impact the %   interference.     03/08/2017 9.6 (H) 4.5 - 6.2 % Final     Comment:     According to ADA guidelines, hemoglobin A1C <7.0% represents  optimal control in non-pregnant diabetic patients.  Different  metrics may apply to specific populations.   Standards of Medical Care in Diabetes - 2016.  For the purpose of screening for the presence of diabetes:  <5.7%     Consistent with the absence of diabetes  5.7-6.4%  Consistent with increasing risk for diabetes   (prediabetes)  >or=6.5%  Consistent with diabetes  Currently no consensus exists for use of hemoglobin A1C  for diagnosis of diabetes for children.     11/08/2016 9.9 (H) 4.5 - 6.2 % Final     Comment:     According to ADA guidelines, hemoglobin A1C <7.0%  represents  optimal control in non-pregnant diabetic patients.  Different  metrics may apply to specific populations.   Standards of Medical Care in Diabetes - 2016.  For the purpose of screening for the presence of diabetes:  <5.7%     Consistent with the absence of diabetes  5.7-6.4%  Consistent with increasing risk for diabetes   (prediabetes)  >or=6.5%  Consistent with diabetes  Currently no consensus exists for use of hemoglobin A1C  for diagnosis of diabetes for children.         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    Benign essential hypertension 11/14/2012    Cataract     s/p phacoemulsification    Chronic kidney disease (CKD), stage III (moderate) 9/12/2013    Diabetic peripheral neuropathy associated with type 2 diabetes mellitus 11/14/2014    causing right hemiparesis    Gait disorder     Hyperlipidemia     Iritis - Both Eyes 6/10/2013    Kidney stone     Lymphedema     Morbid obesity with BMI of 40.0-44.9, adult 2/18/2015    NS (nuclear sclerosis) 4/1/2013    Nuclear sclerosis - Both Eyes 4/29/2013    Preseptal cellulitis - Right Eye 4/29/2013    Proliferative diabetic retinopathy - Both Eyes 4/29/2013    Proliferative diabetic retinopathy, both eyes 4/1/2013    PSC (posterior subcapsular cataract) - Both Eyes 4/29/2013    S/P hernia repair 12/19/2012    TIA (transient ischemic attack) 11/18/2014    Tinea pedis 7/24/2012    Tinea pedis is present on both feet.     Type 2 diabetes mellitus with renal manifestations, controlled 12/12/2013    Type 2 diabetes, controlled, with moderate nonproliferative diabetic retinopathy without macular edema 9/17/2015    Ulcer of left lower extremity, limited to breakdown of skin 7/8/2015    Unspecified cerebral artery occlusion with cerebral infarction 11/16/2014    Unspecified venous (peripheral) insufficiency     UTI (lower urinary tract infection)     Vaginal infection     Vertical  "heterotropia - Both Eyes 7/1/2013       Past Surgical History:   Procedure Laterality Date    APPENDECTOMY      CATARACT EXTRACTION W/  INTRAOCULAR LENS IMPLANT Left 5/21/2013    CATARACT EXTRACTION W/  INTRAOCULAR LENS IMPLANT Right 6/4/2013    CHOLECYSTECTOMY      COLONOSCOPY  12/22/2005    normal    ESOPHAGOGASTRODUODENOSCOPY  12/21/2015    hiatal hernia, Schatzki ring    EYE SURGERY Bilateral 2008    laser surgery both eyes    NASAL SEPTUM SURGERY      SUBTOTAL COLECTOMY  12/13/2012    transverse colon, for incarcerated umbilical hernia, Dr. Kat Bower       Family History   Problem Relation Age of Onset    Diabetes Sister     Cataracts Sister     Heart disease Brother     Cataracts Brother     Leukemia Mother     Cancer Neg Hx     Amblyopia Neg Hx     Blindness Neg Hx     Glaucoma Neg Hx     Hypertension Neg Hx     Macular degeneration Neg Hx     Retinal detachment Neg Hx     Strabismus Neg Hx     Stroke Neg Hx     Thyroid disease Neg Hx     Kidney disease Neg Hx        Social History     Social History    Marital status:      Spouse name: N/A    Number of children: N/A    Years of education: N/A     Social History Main Topics    Smoking status: Former Smoker     Packs/day: 0.50     Years: 15.00     Types: Cigarettes     Quit date: 7/9/1982    Smokeless tobacco: Former User      Comment: smoked one pack per week    Alcohol use No    Drug use: No    Sexual activity: Not Currently     Other Topics Concern    None     Social History Narrative    None       Current Outpatient Prescriptions   Medication Sig Dispense Refill    ACCU-CHEK RAMIRO PLUS METER Misc       ACCU-CHEK RAMIRO PLUS TEST STRP Strp TEST TWICE DAILY 200 strip 3    ACCU-CHEK SOFTCLIX LANCETS Misc       aspirin 81 MG Chew Take 81 mg by mouth once daily.        atorvastatin (LIPITOR) 40 MG tablet TAKE 1 TABLET EVERY EVENING 90 tablet 3    BD INSULIN SYRINGE ULTRA-FINE 1/2 mL 30 gauge x 1/2" Syrg " USE EVERY NIGHT 90 each 3    clopidogrel (PLAVIX) 75 mg tablet TAKE 1 TABLET ONE TIME DAILY 90 tablet 3    econazole nitrate 1 % cream Apply 1 application topically once daily. Apply to affected area 85 g 2    ergocalciferol (ERGOCALCIFEROL) 50,000 unit Cap Take 1 capsule (50,000 Units total) by mouth every 7 days. 12 capsule 3    glimepiride (AMARYL) 1 MG tablet Take 2 tablets (2 mg total) by mouth with lunch. 180 tablet 3    hydrochlorothiazide (HYDRODIURIL) 25 MG tablet Take 1 tablet (25 mg total) by mouth once daily. 30 tablet 3    LANTUS 100 unit/mL injection INJECT 7 TO 10 UNITS SUBCUTANEOUSLY IN THE EVENING DEPENDING ON SCALE (DISCARD EACH VIAL AFTER 28 DAYS) (Patient taking differently: 35 units) 30 mL 3    lisinopril 10 MG tablet TAKE 1 TABLET ONE TIME DAILY 90 tablet 3     No current facility-administered medications for this visit.        Review of patient's allergies indicates:   Allergen Reactions    Penicillins Hives     Other reaction(s): Hives    Sulfa (sulfonamide antibiotics) Other (See Comments)     Eyad, pt states her doctor told her the shakes were possibly caused by an allergy to sulfa         Review of Systems   Constitution: Negative for chills, fever and malaise/fatigue.   Cardiovascular: Negative for chest pain, leg swelling, orthopnea and palpitations.   Respiratory: Negative for cough, shortness of breath and wheezing.    Skin: Positive for color change, dry skin, nail changes and poor wound healing. Negative for itching and rash.   Musculoskeletal: Negative for arthritis, gout, joint pain, joint swelling, muscle weakness and myalgias.   Neurological: Positive for numbness, paresthesias and sensory change. Negative for disturbances in coordination, dizziness, focal weakness and tremors.           Objective:      Physical Exam   Cardiovascular:   Dorsalis pedis and posterior tibial pulses are diminished Bilaterally. Toes are cool to touch. Feet are warm proximally.There is  decreased digital hair. Skin is atrophic, slightly hyperpigmented, and mildly edematous       Musculoskeletal:   Musculoskeletal:  Muscle strength is 5/5 in all groups bilaterally.  No pain with ankle, STJ or MTPJ ROM, bilat. Ankle dorsiflexion is limited with knees extended and flexed, bilat.     Neurological:   [place]-Philip 5.07 monofilamant testing is diminished both feet. Sharp/dull sensation diminished Bilaterally. Light touch absent Bilaterally.       Skin:   Toenails 1-5 bilaterally are elongated by 2-3 mm, thickened by 2-3 mm, discolored/yellowed, dystrophic, brittle with subungual debris. No incurvation. Mild xerosis noted, bilat. No open wounds.                   Assessment:       Encounter Diagnoses   Name Primary?    Uncontrolled type 2 diabetes mellitus with complication, with long-term current use of insulin Yes    Bilateral leg edema     Wheelchair dependent     Venous insufficiency of both lower extremities     Dystrophic nail     Tinea pedis of both feet          Plan:       Sherin was seen today for diabetes mellitus, diabetic foot exam, routine foot care and foot ulcer.    Diagnoses and all orders for this visit:    Uncontrolled type 2 diabetes mellitus with complication, with long-term current use of insulin    Bilateral leg edema    Wheelchair dependent    Venous insufficiency of both lower extremities    Dystrophic nail    Tinea pedis of both feet      I counseled the patient on her conditions, their implications and medical management.        - Shoe inspection. Diabetic Foot Education. Patient reminded of the importance of good nutrition and blood sugar control to help prevent podiatric complications of diabetes. Patient instructed on proper foot hygeine. We discussed wearing proper shoe gear, daily foot inspections, never walking without protective shoe gear, never putting sharp instruments to feet.    - With patient's permission, nails were aggressively reduced and debrided x 10 to  their soft tissue attachment mechanically and with electric , removing all offending nail and debris. Patient relates relief following the procedure. She will continue to monitor the areas daily, inspect her feet, wear protective shoe gear when ambulatory, moisturizer to maintain skin integrity and follow in this office in approximately 3-6 months, sooner p.r.n.    - Continue with wound care and dressing changes.    - Econazole 1% topical cream prescribed for treatment of aforementioned tinea pedis. Patient will use this medication as directed in addition to thourougly drying between toes daily, and applying powder as needed

## 2017-08-21 ENCOUNTER — TELEPHONE (OUTPATIENT)
Dept: ADMINISTRATIVE | Facility: CLINIC | Age: 72
End: 2017-08-21

## 2017-08-22 ENCOUNTER — OFFICE VISIT (OUTPATIENT)
Dept: WOUND CARE | Facility: CLINIC | Age: 72
End: 2017-08-22
Payer: MEDICARE

## 2017-08-22 VITALS
TEMPERATURE: 97 F | DIASTOLIC BLOOD PRESSURE: 70 MMHG | SYSTOLIC BLOOD PRESSURE: 148 MMHG | HEIGHT: 67 IN | HEART RATE: 69 BPM

## 2017-08-22 DIAGNOSIS — E88.09 HYPOALBUMINEMIA: ICD-10-CM

## 2017-08-22 DIAGNOSIS — I87.2 VENOUS STASIS DERMATITIS OF BOTH LOWER EXTREMITIES: ICD-10-CM

## 2017-08-22 DIAGNOSIS — B35.3 TINEA PEDIS OF BOTH FEET: Chronic | ICD-10-CM

## 2017-08-22 DIAGNOSIS — L97.921 ULCER OF LEFT LOWER EXTREMITY, LIMITED TO BREAKDOWN OF SKIN: ICD-10-CM

## 2017-08-22 DIAGNOSIS — R60.0 BILATERAL LEG EDEMA: ICD-10-CM

## 2017-08-22 DIAGNOSIS — L97.512 ULCER OF TOE, RIGHT, WITH FAT LAYER EXPOSED: ICD-10-CM

## 2017-08-22 DIAGNOSIS — L97.912 ULCER OF LOWER LIMB, RIGHT, WITH FAT LAYER EXPOSED: Primary | ICD-10-CM

## 2017-08-22 PROCEDURE — 29580 STRAPPING UNNA BOOT: CPT | Mod: 50,S$GLB,, | Performed by: NURSE PRACTITIONER

## 2017-08-22 PROCEDURE — 99499 UNLISTED E&M SERVICE: CPT | Mod: S$GLB,,, | Performed by: NURSE PRACTITIONER

## 2017-08-22 PROCEDURE — 99999 PR PBB SHADOW E&M-EST. PATIENT-LVL IV: CPT | Mod: PBBFAC,,, | Performed by: NURSE PRACTITIONER

## 2017-08-22 RX ORDER — TRIAMCINOLONE ACETONIDE 1 MG/G
CREAM TOPICAL
Refills: 1 | COMMUNITY
Start: 2017-08-14 | End: 2018-05-25 | Stop reason: SDUPTHER

## 2017-08-22 NOTE — PROGRESS NOTES
Subjective:       Patient ID: Sherin Ortiz is a 72 y.o. female.    Chief Complaint: Wound Check    Wound Check     Wound Check:   This patient is seen today for reevaluation of wounds to bilateral lower legs and the right second toe. An unna boot was on the legs but she removed it before coming to clinic today to shower.  She has had a 24 pound weight gain since her visit on 6/23/17.  Her wounds have been difficult to heal as a result.  Problems that interfere with wound healing include multiple co-morbidities, diabetes, edema, diabetic neuropathy and  morbid obesity. The patient is afebrile. The patient ambulates with great difficulty secondary to her body habitus.  She has no pain.  She denies increased swelling, redness or purulent drainage.   She is afebrile.   Review of Systems   Constitutional: Positive for unexpected weight change (24 pound weight gain since 6/23/17). Negative for chills, diaphoresis and fever.   HENT: Negative for hearing loss, postnasal drip, rhinorrhea, sinus pressure, sneezing, sore throat, tinnitus and trouble swallowing.    Eyes: Negative for visual disturbance.   Respiratory: Negative for apnea, cough, shortness of breath and wheezing.    Cardiovascular: Positive for leg swelling. Negative for chest pain and palpitations.   Gastrointestinal: Negative for constipation, diarrhea, nausea and vomiting.   Genitourinary: Negative for difficulty urinating, dysuria, frequency and hematuria.   Musculoskeletal: Negative for arthralgias, back pain and joint swelling.   Skin: Positive for wound.   Neurological: Negative for dizziness, weakness, light-headedness and headaches.   Hematological: Does not bruise/bleed easily.   Psychiatric/Behavioral: Negative for confusion, decreased concentration, dysphoric mood and sleep disturbance. The patient is not nervous/anxious.    All other systems reviewed and are negative.      Objective:      Physical Exam   Constitutional: She is oriented to  person, place, and time. No distress.   Morbidly obese   HENT:   Head: Normocephalic and atraumatic.   Neck: Normal range of motion. Neck supple.   Pulmonary/Chest: Effort normal.   Musculoskeletal: Normal range of motion. She exhibits edema. She exhibits no tenderness.        Legs:       Feet:    Neurological: She is alert and oriented to person, place, and time.   Skin: Skin is warm and dry. Rash (dermatitis and tinea bilateral lower extremities) noted. She is not diaphoretic. No cyanosis or erythema. No pallor. Nails show no clubbing.   Psychiatric: She has a normal mood and affect. Her behavior is normal. Judgment and thought content normal.   Nursing note and vitals reviewed.    ..  Hemoglobin A1C   Date Value Ref Range Status   07/06/2017 10.0 (H) 4.0 - 5.6 % Final     Comment:     According to ADA guidelines, hemoglobin A1c <7.0% represents  optimal control in non-pregnant diabetic patients. Different  metrics may apply to specific patient populations.   Standards of Medical Care in Diabetes-2016.  For the purpose of screening for the presence of diabetes:  <5.7%     Consistent with the absence of diabetes  5.7-6.4%  Consistent with increasing risk for diabetes   (prediabetes)  >or=6.5%  Consistent with diabetes  Currently, no consensus exists for use of hemoglobin A1c  for diagnosis of diabetes for children.  This Hemoglobin A1c assay has significant interference with fetal   hemoglobin   (HbF). The results are invalid for patients with abnormal amounts of   HbF,   including those with known Hereditary Persistence   of Fetal Hemoglobin. Heterozygous hemoglobin variants (HbAS, HbAC,   HbAD, HbAE, HbA2) do not significantly interfere with this assay;   however, presence of multiple variants in a sample may impact the %   interference.     03/08/2017 9.6 (H) 4.5 - 6.2 % Final     Comment:     According to ADA guidelines, hemoglobin A1C <7.0% represents  optimal control in non-pregnant diabetic patients.   Different  metrics may apply to specific populations.   Standards of Medical Care in Diabetes - 2016.  For the purpose of screening for the presence of diabetes:  <5.7%     Consistent with the absence of diabetes  5.7-6.4%  Consistent with increasing risk for diabetes   (prediabetes)  >or=6.5%  Consistent with diabetes  Currently no consensus exists for use of hemoglobin A1C  for diagnosis of diabetes for children.     11/08/2016 9.9 (H) 4.5 - 6.2 % Final     Comment:     According to ADA guidelines, hemoglobin A1C <7.0% represents  optimal control in non-pregnant diabetic patients.  Different  metrics may apply to specific populations.   Standards of Medical Care in Diabetes - 2016.  For the purpose of screening for the presence of diabetes:  <5.7%     Consistent with the absence of diabetes  5.7-6.4%  Consistent with increasing risk for diabetes   (prediabetes)  >or=6.5%  Consistent with diabetes  Currently no consensus exists for use of hemoglobin A1C  for diagnosis of diabetes for children.       ..  Lab Results   Component Value Date    ALBUMIN 3.2 (L) 07/06/2017     Sherin was seen in the clinic room and placed in the supine position on the treatment table.  Both lower legs were cleansed with Easi-clense sponges and dried thoroughly.  Medihoney gel and a hydrofiber dressing was applied to the bilateral leg wounds.  Eucerin cream was applied to the lower legs.  The ankles were padded with ABD pads.  The patient's feet was positioned at a 90 degree angle.  A zinc oxide wrap, followed by kerlix roll gauze and coban were applied using a spiral technique avoiding creases or folds.  The wraps were started behind the first metatarsal and ended below the tibial tubercle of the knee.  There was overlap of each turn half the width of the previous turn.  The compression wraps will be changed every 3-4 days.      Assessment:       1. Ulcer of lower limb, right, with fat layer exposed    2. Ulcer of left lower extremity,  limited to breakdown of skin    3. Ulcer of toe, right, with fat layer exposed    4. Chronic venous hypertension with ulcer involving both sides    5. Tinea pedis of both feet    6. Venous stasis dermatitis of both lower extremities    7. Hypoalbuminemia    8. Bilateral leg edema    9. Uncontrolled type 2 diabetes mellitus with complication, with long-term current use of insulin        Plan:           Consult podiatry for nail reduction.  Spectazole cream to feet and toes.  Triamcinolone cream to lower legs.  Unna boot bilateral lower legs as detailed above.  Patient was warned not to get the dressings wet and to use cast covers for showering.  Should the dressing become wet, she is to remove it, place a wet-to-dry dressing over the wound, cover with gauze and roll gauze and use ace wraps for compression and to secure bandages.  She should then notify home health as soon as possible to have a new dressing applied.  Apply medihoney gel and hydrofiber dressing to bilateral foot and toe ulcers, cotton in between toes, cover with gauze and secure with roll gauze.  Change dressing twice weekly.  Return to clinic in 2 weeks.  [facility] notified of orders via email.      Home Health Wound Care Orders.  Cleanse wounds with wound .  Spectazole cream to feet and toes.  Triamcinolone cream to lower legs.  Medihoney gel and hydrofiber dressing to bilateral leg wounds.  ABD pad to both ankles and right shin.  Unna boot (zinc oxide, kerlix and coban) bilateral lower legs.  Medihoney gel and hydrofiber to right foot and toe ulcers, cotton in between toes, cover right foot wound with gauze and secure with roll gauze.  Change toe dressing twice weekly.  Change leg wraps twice weekly.    Right medial leg      Right lateral leg      Left medial leg      Left lateral leg      Right second toe

## 2017-09-15 ENCOUNTER — OFFICE VISIT (OUTPATIENT)
Dept: WOUND CARE | Facility: CLINIC | Age: 72
End: 2017-09-15
Payer: MEDICARE

## 2017-09-15 VITALS
HEIGHT: 67 IN | HEART RATE: 71 BPM | DIASTOLIC BLOOD PRESSURE: 76 MMHG | TEMPERATURE: 98 F | SYSTOLIC BLOOD PRESSURE: 151 MMHG

## 2017-09-15 DIAGNOSIS — L97.921 ULCER OF LEFT LOWER EXTREMITY, LIMITED TO BREAKDOWN OF SKIN: Primary | ICD-10-CM

## 2017-09-15 DIAGNOSIS — L97.912 ULCER OF LOWER LIMB, RIGHT, WITH FAT LAYER EXPOSED: ICD-10-CM

## 2017-09-15 DIAGNOSIS — B35.3 TINEA PEDIS OF BOTH FEET: Chronic | ICD-10-CM

## 2017-09-15 DIAGNOSIS — R60.0 BILATERAL LEG EDEMA: ICD-10-CM

## 2017-09-15 DIAGNOSIS — L97.512 ULCER OF TOE, RIGHT, WITH FAT LAYER EXPOSED: ICD-10-CM

## 2017-09-15 DIAGNOSIS — I87.2 VENOUS STASIS DERMATITIS OF BOTH LOWER EXTREMITIES: ICD-10-CM

## 2017-09-15 PROCEDURE — 99499 UNLISTED E&M SERVICE: CPT | Mod: S$GLB,,, | Performed by: NURSE PRACTITIONER

## 2017-09-15 PROCEDURE — 29580 STRAPPING UNNA BOOT: CPT | Mod: 50,S$GLB,, | Performed by: NURSE PRACTITIONER

## 2017-09-15 PROCEDURE — 99999 PR PBB SHADOW E&M-EST. PATIENT-LVL IV: CPT | Mod: PBBFAC,,, | Performed by: NURSE PRACTITIONER

## 2017-09-15 NOTE — PROGRESS NOTES
Subjective:       Patient ID: Sherin Ortiz is a 72 y.o. female.    Chief Complaint: Wound Check    Wound Check     Wound Check:   This patient is seen today for reevaluation of wounds to bilateral lower legs and the right second toe. An unna boot was on the legs but she removed it before coming to clinic today to shower.  She has had a 24 pound weight gain since her visit on 6/23/17.  Her wounds have been difficult to heal as a result.  Problems that interfere with wound healing include multiple co-morbidities, diabetes, edema, diabetic neuropathy and  morbid obesity. The patient is afebrile. The patient ambulates with great difficulty secondary to her body habitus.  She has no pain.  She denies increased swelling, redness or purulent drainage.   She is afebrile.   Review of Systems   Constitutional: Positive for unexpected weight change (24 pound weight gain since 6/23/17). Negative for chills, diaphoresis and fever.   HENT: Negative for hearing loss, postnasal drip, rhinorrhea, sinus pressure, sneezing, sore throat, tinnitus and trouble swallowing.    Eyes: Negative for visual disturbance.   Respiratory: Negative for apnea, cough, shortness of breath and wheezing.    Cardiovascular: Positive for leg swelling. Negative for chest pain and palpitations.   Gastrointestinal: Negative for constipation, diarrhea, nausea and vomiting.   Genitourinary: Negative for difficulty urinating, dysuria, frequency and hematuria.   Musculoskeletal: Negative for arthralgias, back pain and joint swelling.   Skin: Positive for wound.   Neurological: Negative for dizziness, weakness, light-headedness and headaches.   Hematological: Does not bruise/bleed easily.   Psychiatric/Behavioral: Negative for confusion, decreased concentration, dysphoric mood and sleep disturbance. The patient is not nervous/anxious.    All other systems reviewed and are negative.      Objective:      Physical Exam   Constitutional: She is oriented to  person, place, and time. No distress.   Morbidly obese   HENT:   Head: Normocephalic and atraumatic.   Neck: Normal range of motion. Neck supple.   Pulmonary/Chest: Effort normal.   Musculoskeletal: Normal range of motion. She exhibits edema. She exhibits no tenderness.        Legs:       Feet:    Neurological: She is alert and oriented to person, place, and time.   Skin: Skin is warm and dry. Rash (dermatitis and tinea bilateral lower extremities) noted. She is not diaphoretic. No cyanosis or erythema. No pallor. Nails show no clubbing.   Psychiatric: She has a normal mood and affect. Her behavior is normal. Judgment and thought content normal.   Nursing note and vitals reviewed.    ..  Hemoglobin A1C   Date Value Ref Range Status   07/06/2017 10.0 (H) 4.0 - 5.6 % Final     Comment:     According to ADA guidelines, hemoglobin A1c <7.0% represents  optimal control in non-pregnant diabetic patients. Different  metrics may apply to specific patient populations.   Standards of Medical Care in Diabetes-2016.  For the purpose of screening for the presence of diabetes:  <5.7%     Consistent with the absence of diabetes  5.7-6.4%  Consistent with increasing risk for diabetes   (prediabetes)  >or=6.5%  Consistent with diabetes  Currently, no consensus exists for use of hemoglobin A1c  for diagnosis of diabetes for children.  This Hemoglobin A1c assay has significant interference with fetal   hemoglobin   (HbF). The results are invalid for patients with abnormal amounts of   HbF,   including those with known Hereditary Persistence   of Fetal Hemoglobin. Heterozygous hemoglobin variants (HbAS, HbAC,   HbAD, HbAE, HbA2) do not significantly interfere with this assay;   however, presence of multiple variants in a sample may impact the %   interference.     03/08/2017 9.6 (H) 4.5 - 6.2 % Final     Comment:     According to ADA guidelines, hemoglobin A1C <7.0% represents  optimal control in non-pregnant diabetic patients.   Different  metrics may apply to specific populations.   Standards of Medical Care in Diabetes - 2016.  For the purpose of screening for the presence of diabetes:  <5.7%     Consistent with the absence of diabetes  5.7-6.4%  Consistent with increasing risk for diabetes   (prediabetes)  >or=6.5%  Consistent with diabetes  Currently no consensus exists for use of hemoglobin A1C  for diagnosis of diabetes for children.     11/08/2016 9.9 (H) 4.5 - 6.2 % Final     Comment:     According to ADA guidelines, hemoglobin A1C <7.0% represents  optimal control in non-pregnant diabetic patients.  Different  metrics may apply to specific populations.   Standards of Medical Care in Diabetes - 2016.  For the purpose of screening for the presence of diabetes:  <5.7%     Consistent with the absence of diabetes  5.7-6.4%  Consistent with increasing risk for diabetes   (prediabetes)  >or=6.5%  Consistent with diabetes  Currently no consensus exists for use of hemoglobin A1C  for diagnosis of diabetes for children.       ..  Lab Results   Component Value Date    ALBUMIN 3.2 (L) 07/06/2017     Sherin was seen in the clinic room and placed in the supine position on the treatment table.  Both lower legs were cleansed with Easi-clense sponges and dried thoroughly.  Medihoney gel and a hydrofiber dressing was applied to the bilateral leg wounds.  Eucerin cream was applied to the lower legs.  The ankles were padded with ABD pads.  The patient's feet was positioned at a 90 degree angle.  A zinc oxide wrap, followed by kerlix roll gauze and coban were applied using a spiral technique avoiding creases or folds.  The wraps were started behind the first metatarsal and ended below the tibial tubercle of the knee.  There was overlap of each turn half the width of the previous turn.  The compression wraps will be changed every 3-4 days.      Assessment:       1. Ulcer of left lower extremity, limited to breakdown of skin    2. Ulcer of lower limb,  right, with fat layer exposed    3. Ulcer of toe, right, with fat layer exposed    4. Chronic venous hypertension with ulcer involving both sides    5. Tinea pedis of both feet    6. Venous stasis dermatitis of both lower extremities        Plan:           Spectazole cream to feet and toes.  Triamcinolone cream to lower legs.  Unna boot bilateral lower legs as detailed above.  Patient was warned not to get the dressings wet and to use cast covers for showering.  Should the dressing become wet, she is to remove it, place a wet-to-dry dressing over the wound, cover with gauze and roll gauze and use ace wraps for compression and to secure bandages.  She should then notify home health as soon as possible to have a new dressing applied.  Apply medihoney gel and hydrofiber dressing to bilateral foot and toe ulcers, cotton in between toes, cover with gauze and secure with roll gauze.  Change dressing twice weekly.  Return to clinic in 2 weeks.  [facility] notified of orders via email.      Home Health Wound Care Orders.  Cleanse wounds with wound .  Spectazole cream to feet and toes.  Triamcinolone cream to lower legs.  Medihoney gel and hydrofiber dressing to bilateral leg wounds.  ABD pad to both ankles and right shin.  Unna boot (zinc oxide, kerlix and coban) bilateral lower legs.  Medihoney gel and hydrofiber to left foot and right second toe ulcers, cotton in between toes, cover right foot wound with gauze and secure with roll gauze.  Change toe dressing twice weekly.  Change leg wraps twice weekly.    Right medial leg      Right lateral leg      Left medial leg      Left lateral leg      Right second toe       Left dorsal  foot

## 2017-10-03 ENCOUNTER — OFFICE VISIT (OUTPATIENT)
Dept: WOUND CARE | Facility: CLINIC | Age: 72
End: 2017-10-03
Payer: MEDICARE

## 2017-10-03 VITALS
TEMPERATURE: 98 F | DIASTOLIC BLOOD PRESSURE: 70 MMHG | HEIGHT: 67 IN | SYSTOLIC BLOOD PRESSURE: 139 MMHG | HEART RATE: 71 BPM

## 2017-10-03 DIAGNOSIS — R60.0 BILATERAL LEG EDEMA: ICD-10-CM

## 2017-10-03 DIAGNOSIS — I87.2 VENOUS STASIS DERMATITIS OF BOTH LOWER EXTREMITIES: ICD-10-CM

## 2017-10-03 DIAGNOSIS — B35.3 TINEA PEDIS OF BOTH FEET: Chronic | ICD-10-CM

## 2017-10-03 DIAGNOSIS — L97.912 ULCER OF RIGHT LOWER EXTREMITY WITH FAT LAYER EXPOSED: ICD-10-CM

## 2017-10-03 DIAGNOSIS — I87.2 VENOUS INSUFFICIENCY OF BOTH LOWER EXTREMITIES: ICD-10-CM

## 2017-10-03 DIAGNOSIS — L97.921 ULCER OF LEFT LOWER EXTREMITY, LIMITED TO BREAKDOWN OF SKIN: Primary | ICD-10-CM

## 2017-10-03 DIAGNOSIS — L97.512 SKIN ULCER OF TOE OF RIGHT FOOT WITH FAT LAYER EXPOSED: ICD-10-CM

## 2017-10-03 PROCEDURE — 29580 STRAPPING UNNA BOOT: CPT | Mod: 50,S$GLB,, | Performed by: NURSE PRACTITIONER

## 2017-10-03 PROCEDURE — 99499 UNLISTED E&M SERVICE: CPT | Mod: S$GLB,,, | Performed by: NURSE PRACTITIONER

## 2017-10-03 PROCEDURE — 99999 PR PBB SHADOW E&M-EST. PATIENT-LVL IV: CPT | Mod: PBBFAC,,, | Performed by: NURSE PRACTITIONER

## 2017-10-03 NOTE — PROGRESS NOTES
Subjective:       Patient ID: Sherin Ortiz is a 72 y.o. female.    Chief Complaint: Wound Check    Wound Check     Wound Check:   This patient is seen today for reevaluation of wounds to bilateral lower legs and the right second toe. An unna boot was on the legs but she removed it before coming to clinic today to shower.  She has had a 24 pound weight gain since her visit on 6/23/17.  Her wounds have been difficult to heal as a result.  Problems that interfere with wound healing include multiple co-morbidities, diabetes, edema, diabetic neuropathy and  morbid obesity. The patient is afebrile. The patient ambulates with great difficulty secondary to her body habitus.  She has no pain.  She denies increased swelling, redness or purulent drainage.   She is afebrile.   Review of Systems   Constitutional: Positive for unexpected weight change (24 pound weight gain since 6/23/17). Negative for chills, diaphoresis and fever.   HENT: Negative for hearing loss, postnasal drip, rhinorrhea, sinus pressure, sneezing, sore throat, tinnitus and trouble swallowing.    Eyes: Negative for visual disturbance.   Respiratory: Negative for apnea, cough, shortness of breath and wheezing.    Cardiovascular: Positive for leg swelling. Negative for chest pain and palpitations.   Gastrointestinal: Negative for constipation, diarrhea, nausea and vomiting.   Genitourinary: Negative for difficulty urinating, dysuria, frequency and hematuria.   Musculoskeletal: Negative for arthralgias, back pain and joint swelling.   Skin: Positive for wound.   Neurological: Negative for dizziness, weakness, light-headedness and headaches.   Hematological: Does not bruise/bleed easily.   Psychiatric/Behavioral: Negative for confusion, decreased concentration, dysphoric mood and sleep disturbance. The patient is not nervous/anxious.    All other systems reviewed and are negative.      Objective:      Physical Exam   Constitutional: She is oriented to  person, place, and time. No distress.   Morbidly obese   HENT:   Head: Normocephalic and atraumatic.   Neck: Normal range of motion. Neck supple.   Pulmonary/Chest: Effort normal.   Musculoskeletal: Normal range of motion. She exhibits edema. She exhibits no tenderness.        Legs:       Feet:    Neurological: She is alert and oriented to person, place, and time.   Skin: Skin is warm and dry. Rash (dermatitis and tinea bilateral lower extremities) noted. She is not diaphoretic. No cyanosis or erythema. No pallor. Nails show no clubbing.   Psychiatric: She has a normal mood and affect. Her behavior is normal. Judgment and thought content normal.   Nursing note and vitals reviewed.    ..  Hemoglobin A1C   Date Value Ref Range Status   07/06/2017 10.0 (H) 4.0 - 5.6 % Final     Comment:     According to ADA guidelines, hemoglobin A1c <7.0% represents  optimal control in non-pregnant diabetic patients. Different  metrics may apply to specific patient populations.   Standards of Medical Care in Diabetes-2016.  For the purpose of screening for the presence of diabetes:  <5.7%     Consistent with the absence of diabetes  5.7-6.4%  Consistent with increasing risk for diabetes   (prediabetes)  >or=6.5%  Consistent with diabetes  Currently, no consensus exists for use of hemoglobin A1c  for diagnosis of diabetes for children.  This Hemoglobin A1c assay has significant interference with fetal   hemoglobin   (HbF). The results are invalid for patients with abnormal amounts of   HbF,   including those with known Hereditary Persistence   of Fetal Hemoglobin. Heterozygous hemoglobin variants (HbAS, HbAC,   HbAD, HbAE, HbA2) do not significantly interfere with this assay;   however, presence of multiple variants in a sample may impact the %   interference.     03/08/2017 9.6 (H) 4.5 - 6.2 % Final     Comment:     According to ADA guidelines, hemoglobin A1C <7.0% represents  optimal control in non-pregnant diabetic patients.   Different  metrics may apply to specific populations.   Standards of Medical Care in Diabetes - 2016.  For the purpose of screening for the presence of diabetes:  <5.7%     Consistent with the absence of diabetes  5.7-6.4%  Consistent with increasing risk for diabetes   (prediabetes)  >or=6.5%  Consistent with diabetes  Currently no consensus exists for use of hemoglobin A1C  for diagnosis of diabetes for children.     11/08/2016 9.9 (H) 4.5 - 6.2 % Final     Comment:     According to ADA guidelines, hemoglobin A1C <7.0% represents  optimal control in non-pregnant diabetic patients.  Different  metrics may apply to specific populations.   Standards of Medical Care in Diabetes - 2016.  For the purpose of screening for the presence of diabetes:  <5.7%     Consistent with the absence of diabetes  5.7-6.4%  Consistent with increasing risk for diabetes   (prediabetes)  >or=6.5%  Consistent with diabetes  Currently no consensus exists for use of hemoglobin A1C  for diagnosis of diabetes for children.       ..  Lab Results   Component Value Date    ALBUMIN 3.2 (L) 07/06/2017     Sherin was seen in the clinic room and placed in the supine position on the treatment table.  Both lower legs were cleansed with Easi-clense sponges and dried thoroughly.  Medihoney gel and a hydrofiber dressing was applied to the bilateral leg wounds.  Eucerin cream was applied to the lower legs.  The ankles were padded with ABD pads.  The patient's feet was positioned at a 90 degree angle.  A zinc oxide wrap, followed by kerlix roll gauze and coban were applied using a spiral technique avoiding creases or folds.  The wraps were started behind the first metatarsal and ended below the tibial tubercle of the knee.  There was overlap of each turn half the width of the previous turn.  The compression wraps will be changed every 3-4 days.      Assessment:       1. Ulcer of left lower extremity, limited to breakdown of skin    2. Ulcer of right lower  extremity with fat layer exposed    3. Skin ulcer of toe of right foot with fat layer exposed    4. Chronic venous hypertension with ulcer involving both sides    5. Tinea pedis of both feet    6. Venous stasis dermatitis of both lower extremities    7. Bilateral leg edema    8. Uncontrolled type 2 diabetes mellitus with complication, with long-term current use of insulin        Plan:           Spectazole cream to feet and toes.  Triamcinolone cream to lower legs.  Unna boot bilateral lower legs as detailed above.  Patient was warned not to get the dressings wet and to use cast covers for showering.  Should the dressing become wet, she is to remove it, place a wet-to-dry dressing over the wound, cover with gauze and roll gauze and use ace wraps for compression and to secure bandages.  She should then notify home health as soon as possible to have a new dressing applied.  Apply medihoney gel and hydrofiber dressing to bilateral foot and toe ulcers, cotton in between toes, cover with gauze and secure with roll gauze.  Change dressing twice weekly.  Return to clinic in 2 weeks.  [facility] notified of orders via email.      Home Health Wound Care Orders.  Cleanse wounds with wound .  Spectazole cream to feet and toes.  Triamcinolone cream to lower legs.  Medihoney gel and hydrofiber dressing to bilateral leg wounds.  ABD pad to both ankles and right shin.  Unna boot (zinc oxide, kerlix and coban) bilateral lower legs.  Medihoney gel and hydrofiber to left foot and right second toe ulcers, cotton in between toes, cover with gauze and secure with roll gauze.  Change toe dressing twice weekly.  Change leg wraps twice weekly.    Right medial leg      Right lateral leg      Left medial leg      Right second toe       Left dorsal  foot

## 2017-10-09 ENCOUNTER — IMMUNIZATION (OUTPATIENT)
Dept: INTERNAL MEDICINE | Facility: CLINIC | Age: 72
End: 2017-10-09
Payer: MEDICARE

## 2017-10-09 ENCOUNTER — OFFICE VISIT (OUTPATIENT)
Dept: OPTOMETRY | Facility: CLINIC | Age: 72
End: 2017-10-09
Payer: MEDICARE

## 2017-10-09 ENCOUNTER — LAB VISIT (OUTPATIENT)
Dept: LAB | Facility: HOSPITAL | Age: 72
End: 2017-10-09
Attending: INTERNAL MEDICINE
Payer: MEDICARE

## 2017-10-09 ENCOUNTER — OFFICE VISIT (OUTPATIENT)
Dept: INTERNAL MEDICINE | Facility: CLINIC | Age: 72
End: 2017-10-09
Payer: MEDICARE

## 2017-10-09 VITALS
SYSTOLIC BLOOD PRESSURE: 132 MMHG | HEART RATE: 67 BPM | DIASTOLIC BLOOD PRESSURE: 64 MMHG | WEIGHT: 293 LBS | HEIGHT: 67 IN | OXYGEN SATURATION: 97 % | BODY MASS INDEX: 45.99 KG/M2

## 2017-10-09 DIAGNOSIS — H52.223 REGULAR ASTIGMATISM WITH PRESBYOPIA, BILATERAL: ICD-10-CM

## 2017-10-09 DIAGNOSIS — I10 BENIGN ESSENTIAL HYPERTENSION: Primary | Chronic | ICD-10-CM

## 2017-10-09 DIAGNOSIS — N18.30 STAGE 3 CHRONIC KIDNEY DISEASE DUE TO TYPE 2 DIABETES MELLITUS: Chronic | ICD-10-CM

## 2017-10-09 DIAGNOSIS — D63.8 ANEMIA OF CHRONIC DISEASE: ICD-10-CM

## 2017-10-09 DIAGNOSIS — Z96.1 PSEUDOPHAKIA OF BOTH EYES: ICD-10-CM

## 2017-10-09 DIAGNOSIS — Z98.890 HISTORY OF LASER PHOTOCOAGULATION OF RETINA: ICD-10-CM

## 2017-10-09 DIAGNOSIS — M25.511 RIGHT SHOULDER PAIN, UNSPECIFIED CHRONICITY: ICD-10-CM

## 2017-10-09 DIAGNOSIS — E11.22 STAGE 3 CHRONIC KIDNEY DISEASE DUE TO TYPE 2 DIABETES MELLITUS: Chronic | ICD-10-CM

## 2017-10-09 DIAGNOSIS — Z79.4 CURRENT USE OF INSULIN: ICD-10-CM

## 2017-10-09 DIAGNOSIS — E11.9 TYPE 2 DIABETES MELLITUS WITHOUT RETINOPATHY: Primary | ICD-10-CM

## 2017-10-09 DIAGNOSIS — H52.4 REGULAR ASTIGMATISM WITH PRESBYOPIA, BILATERAL: ICD-10-CM

## 2017-10-09 DIAGNOSIS — E78.5 HYPERLIPIDEMIA LDL GOAL <100: Chronic | ICD-10-CM

## 2017-10-09 DIAGNOSIS — Z99.3 WHEELCHAIR DEPENDENT: Chronic | ICD-10-CM

## 2017-10-09 LAB
ALBUMIN SERPL BCP-MCNC: 3 G/DL
ALP SERPL-CCNC: 144 U/L
ALT SERPL W/O P-5'-P-CCNC: 18 U/L
ANION GAP SERPL CALC-SCNC: 6 MMOL/L
AST SERPL-CCNC: 15 U/L
BASOPHILS # BLD AUTO: 0.02 K/UL
BASOPHILS NFR BLD: 0.5 %
BILIRUB SERPL-MCNC: 0.4 MG/DL
BUN SERPL-MCNC: 32 MG/DL
CALCIUM SERPL-MCNC: 9.7 MG/DL
CHLORIDE SERPL-SCNC: 105 MMOL/L
CO2 SERPL-SCNC: 25 MMOL/L
CREAT SERPL-MCNC: 1.5 MG/DL
DIFFERENTIAL METHOD: ABNORMAL
EOSINOPHIL # BLD AUTO: 0.2 K/UL
EOSINOPHIL NFR BLD: 5.4 %
ERYTHROCYTE [DISTWIDTH] IN BLOOD BY AUTOMATED COUNT: 13.3 %
EST. GFR  (AFRICAN AMERICAN): 39 ML/MIN/1.73 M^2
EST. GFR  (NON AFRICAN AMERICAN): 34 ML/MIN/1.73 M^2
ESTIMATED AVG GLUCOSE: 237 MG/DL
GLUCOSE SERPL-MCNC: 335 MG/DL
HBA1C MFR BLD HPLC: 9.9 %
HCT VFR BLD AUTO: 34.5 %
HGB BLD-MCNC: 11.4 G/DL
LYMPHOCYTES # BLD AUTO: 1 K/UL
LYMPHOCYTES NFR BLD: 23.6 %
MCH RBC QN AUTO: 29.7 PG
MCHC RBC AUTO-ENTMCNC: 33 G/DL
MCV RBC AUTO: 90 FL
MONOCYTES # BLD AUTO: 0.3 K/UL
MONOCYTES NFR BLD: 7.1 %
NEUTROPHILS # BLD AUTO: 2.7 K/UL
NEUTROPHILS NFR BLD: 63.2 %
PLATELET # BLD AUTO: 235 K/UL
PMV BLD AUTO: 9.4 FL
POTASSIUM SERPL-SCNC: 5.5 MMOL/L
PROT SERPL-MCNC: 7.9 G/DL
RBC # BLD AUTO: 3.84 M/UL
SODIUM SERPL-SCNC: 136 MMOL/L
WBC # BLD AUTO: 4.24 K/UL

## 2017-10-09 PROCEDURE — 99499 UNLISTED E&M SERVICE: CPT | Mod: S$GLB,,, | Performed by: OPTOMETRIST

## 2017-10-09 PROCEDURE — 90662 IIV NO PRSV INCREASED AG IM: CPT | Mod: S$GLB,,, | Performed by: INTERNAL MEDICINE

## 2017-10-09 PROCEDURE — 99214 OFFICE O/P EST MOD 30 MIN: CPT | Mod: S$GLB,,, | Performed by: INTERNAL MEDICINE

## 2017-10-09 PROCEDURE — 80053 COMPREHEN METABOLIC PANEL: CPT

## 2017-10-09 PROCEDURE — G0008 ADMIN INFLUENZA VIRUS VAC: HCPCS | Mod: S$GLB,,, | Performed by: INTERNAL MEDICINE

## 2017-10-09 PROCEDURE — 99999 PR PBB SHADOW E&M-EST. PATIENT-LVL III: CPT | Mod: PBBFAC,,, | Performed by: INTERNAL MEDICINE

## 2017-10-09 PROCEDURE — 92015 DETERMINE REFRACTIVE STATE: CPT | Mod: S$GLB,,, | Performed by: OPTOMETRIST

## 2017-10-09 PROCEDURE — 83036 HEMOGLOBIN GLYCOSYLATED A1C: CPT

## 2017-10-09 PROCEDURE — 92004 COMPRE OPH EXAM NEW PT 1/>: CPT | Mod: S$GLB,,, | Performed by: OPTOMETRIST

## 2017-10-09 PROCEDURE — 99499 UNLISTED E&M SERVICE: CPT | Mod: S$GLB,,, | Performed by: INTERNAL MEDICINE

## 2017-10-09 PROCEDURE — 85025 COMPLETE CBC W/AUTO DIFF WBC: CPT

## 2017-10-09 PROCEDURE — 99999 PR PBB SHADOW E&M-EST. PATIENT-LVL II: CPT | Mod: PBBFAC,,, | Performed by: OPTOMETRIST

## 2017-10-09 PROCEDURE — 36415 COLL VENOUS BLD VENIPUNCTURE: CPT

## 2017-10-09 RX ORDER — DICLOFENAC SODIUM 10 MG/G
2 GEL TOPICAL NIGHTLY PRN
Qty: 1 TUBE | Refills: 0 | Status: SHIPPED | OUTPATIENT
Start: 2017-10-09 | End: 2019-05-09

## 2017-10-09 NOTE — PROGRESS NOTES
Subjective:      Patient ID: Sherin Ortiz is a 72 y.o. female.    Chief Complaint: Follow-up (3 months f/u)    HPI:  HPI   Patient was seen by Dr. Choi who changed her blood pressure medication  She sometimes finds that she has trouble moving her legs and arm which is new since the medication. Her blood pressure is well controlled on the medication.    She also injured her right shoulder/upper arm picking up a pressure cooker. She finds at times it is hard to move the arm and she has to pick it up. Today she was able to raise and lower it. We discussed options for a cream and she may see Ortho.  Patient Active Problem List   Diagnosis    Bilateral leg edema    Tinea pedis    Hyperlipidemia LDL goal <100    Benign essential hypertension    PSC (posterior subcapsular cataract) - Both Eyes    Nuclear sclerosis - Both Eyes    Asteroid hyalosis - Left Eye    Stage 3 chronic kidney disease due to type 2 diabetes mellitus    Dystrophic nail    Inability to walk    Morbid obesity with BMI of 40.0-44.9, adult    Moderate nonproliferative diabetic retinopathy, without macular edema, associated with type 2 diabetes mellitus    Diabetic peripheral neuropathy associated with type 2 diabetes mellitus    Anemia of chronic disease    Hypoalbuminemia    Wheelchair dependent    Ureteral stone with hydronephrosis    Type II diabetes mellitus with complication, uncontrolled    Ulcer of toe    Venous insufficiency of leg    Nephrolithiasis    Ulcer of lower limb    Dermatitis, stasis    Chronic venous hypertension with ulcer involving both sides    Uncontrolled type 2 diabetes mellitus with stage 3 chronic kidney disease, with long-term current use of insulin    Ulcer of left lower extremity, limited to breakdown of skin    Hyperparathyroidism    Aortic atherosclerosis    PAOD (peripheral arterial occlusive disease)     Past Medical History:   Diagnosis Date    Allergy     Asteroid hyalosis - Left  Eye 4/29/2013    Benign essential hypertension 11/14/2012    Cataract     s/p phacoemulsification    Chronic kidney disease (CKD), stage III (moderate) 9/12/2013    Diabetic peripheral neuropathy associated with type 2 diabetes mellitus 11/14/2014    causing right hemiparesis    Gait disorder     Hyperlipidemia     Iritis - Both Eyes 6/10/2013    Kidney stone     Lymphedema     Morbid obesity with BMI of 40.0-44.9, adult 2/18/2015    NS (nuclear sclerosis) 4/1/2013    Nuclear sclerosis - Both Eyes 4/29/2013    Preseptal cellulitis - Right Eye 4/29/2013    Proliferative diabetic retinopathy - Both Eyes 4/29/2013    Proliferative diabetic retinopathy, both eyes 4/1/2013    PSC (posterior subcapsular cataract) - Both Eyes 4/29/2013    S/P hernia repair 12/19/2012    TIA (transient ischemic attack) 11/18/2014    Tinea pedis 7/24/2012    Tinea pedis is present on both feet.     Type 2 diabetes mellitus with renal manifestations, controlled 12/12/2013    Type 2 diabetes, controlled, with moderate nonproliferative diabetic retinopathy without macular edema 9/17/2015    Ulcer of left lower extremity, limited to breakdown of skin 7/8/2015    Unspecified cerebral artery occlusion with cerebral infarction 11/16/2014    Unspecified venous (peripheral) insufficiency     UTI (lower urinary tract infection)     Vaginal infection     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     Past Surgical History:   Procedure Laterality Date    APPENDECTOMY      CATARACT EXTRACTION W/  INTRAOCULAR LENS IMPLANT Left 5/21/2013    CATARACT EXTRACTION W/  INTRAOCULAR LENS IMPLANT Right 6/4/2013    CHOLECYSTECTOMY      COLONOSCOPY  12/22/2005    normal    ESOPHAGOGASTRODUODENOSCOPY  12/21/2015    hiatal hernia, Schatzki ring    EYE SURGERY Bilateral 2008    laser surgery both eyes    NASAL SEPTUM SURGERY      SUBTOTAL COLECTOMY  12/13/2012    transverse colon, for incarcerated umbilical hernia, Dr. Stephens  "Cheli     Family History   Problem Relation Age of Onset    Diabetes Sister     Cataracts Sister     Heart disease Brother     Cataracts Brother     Leukemia Mother     Cancer Neg Hx     Amblyopia Neg Hx     Blindness Neg Hx     Glaucoma Neg Hx     Hypertension Neg Hx     Macular degeneration Neg Hx     Retinal detachment Neg Hx     Strabismus Neg Hx     Stroke Neg Hx     Thyroid disease Neg Hx     Kidney disease Neg Hx      Review of Systems   Constitutional: Negative for activity change, chills, fever and unexpected weight change.   Respiratory: Negative for cough, chest tightness, shortness of breath and wheezing.    Cardiovascular: Negative for chest pain, palpitations and leg swelling.   Musculoskeletal:        See Ms. Alvarenga note and documentation     Objective:     Vitals:    10/09/17 1053   BP: 132/64   Pulse: 67   SpO2: 97%   Weight: (!) 147.9 kg (326 lb)   Height: 5' 7" (1.702 m)   PainSc: 0-No pain     Body mass index is 51.06 kg/m².  Physical Exam   Constitutional: She appears well-developed and well-nourished.   Neck: No JVD present. No thyromegaly present.   Cardiovascular: Normal rate, normal heart sounds and intact distal pulses.    Pulmonary/Chest: Effort normal and breath sounds normal. No respiratory distress.   Musculoskeletal:   See Ms. Grubbs notes     Assessment:     1. Benign essential hypertension    2. Stage 3 chronic kidney disease due to type 2 diabetes mellitus    3. Uncontrolled type 2 diabetes mellitus with stage 3 chronic kidney disease, with long-term current use of insulin    4. Wheelchair dependent    5. Hyperlipidemia LDL goal <100    6. Anemia of chronic disease    7. Right shoulder pain, unspecified chronicity      Plan:   Sherin was seen today for follow-up.    Diagnoses and all orders for this visit:    Benign essential hypertension: patient will try and decrease the fluid pill to 1/2    Stage 3 chronic kidney disease due to type 2 diabetes mellitus: " "will get lab  -     diclofenac sodium 1 % Gel; Apply 2 g topically nightly as needed.    Uncontrolled type 2 diabetes mellitus with stage 3 chronic kidney disease, with long-term current use of insulin: lab ordered  -     CBC auto differential; Future  -     Comprehensive metabolic panel; Future  -     Hemoglobin A1c; Future  -     diclofenac sodium 1 % Gel; Apply 2 g topically nightly as needed.    Wheelchair dependent    Hyperlipidemia LDL goal <100  The current medical regimen is effective;  continue present plan and medications.      Anemia of chronic disease  The current medical regimen is effective;  continue present plan and medications.      Right shoulder pain, unspecified chronicity  -     diclofenac sodium 1 % Gel; Apply 2 g topically nightly as needed.    Other orders  -     Cancel: DXA Bone Density Spine And Hip; Future  -     Cancel: Influenza - High Dose (65+) (PF) (IM)      Return in about 3 months (around 1/9/2018) for Follow up.     Medication List          Accurate as of 10/9/17 11:36 AM. If you have any questions, ask your nurse or doctor.               START taking these medications    diclofenac sodium 1 % Gel  Apply 2 g topically nightly as needed.  Started by:  Ame Vasquez MD        CHANGE how you take these medications    LANTUS 100 unit/mL injection  Generic drug:  insulin glargine  INJECT 7 TO 10 UNITS SUBCUTANEOUSLY IN THE EVENING DEPENDING ON SCALE (DISCARD EACH VIAL AFTER 28 DAYS)  What changed:  See the new instructions.        CONTINUE taking these medications    ACCU-CHEK RAMIRO PLUS METER Misc  Generic drug:  blood-glucose meter     ACCU-CHEK RAMIRO PLUS TEST STRP Strp  Generic drug:  blood sugar diagnostic  TEST TWICE DAILY     ACCU-CHEK SOFTCLIX LANCETS Misc  Generic drug:  lancets     aspirin 81 MG Chew     atorvastatin 40 MG tablet  Commonly known as:  LIPITOR  TAKE 1 TABLET EVERY EVENING     BD INSULIN SYRINGE ULTRA-FINE 1/2 mL 30 gauge x 1/2" Syrg  Generic drug:  insulin " syringe-needle U-100  USE EVERY NIGHT     clopidogrel 75 mg tablet  Commonly known as:  PLAVIX  TAKE 1 TABLET ONE TIME DAILY     econazole nitrate 1 % cream  Apply 1 application topically once daily. Apply to affected area     ergocalciferol 50,000 unit Cap  Commonly known as:  ERGOCALCIFEROL  Take 1 capsule (50,000 Units total) by mouth every 7 days.     glimepiride 1 MG tablet  Commonly known as:  AMARYL  Take 2 tablets (2 mg total) by mouth with lunch.     hydrochlorothiazide 25 MG tablet  Commonly known as:  HYDRODIURIL  Take 1 tablet (25 mg total) by mouth once daily.     lisinopril 10 MG tablet  TAKE 1 TABLET ONE TIME DAILY     triamcinolone acetonide 0.1% 0.1 % cream  Commonly known as:  KENALOG           Where to Get Your Medications      These medications were sent to [facility] [address]  [address] [facility] [address]    Phone:  [phone]   · diclofenac sodium 1 % Gel

## 2017-10-09 NOTE — PROGRESS NOTES
HPI     Ms. Sherin Ortiz was referred by Ame Vasquez MD for a diabetic   eye exam.    Patient complains of having trouble reading small print even with +3.00   OTC readers. She would like a prescription for readers (she is not   interested in wearing a bifocal lens).    (-)drops  (-)flashes  (+)floaters OS only   (+)diplopia (only at distance in one field of gaze, is not usually   bothersome to patient)    Diabetic yes x about 35 years  Hemoglobin A1C       Date                     Value               Ref Range             Status                07/06/2017               10.0 (H)            4.0 - 5.6 %           Final                 03/08/2017               9.6 (H)             4.5 - 6.2 %           Final                 11/08/2016               9.9 (H)             4.5 - 6.2 %           Final                OCULAR HISTORY  Last Eye Exam 07/01/13 with Dr. Cox   S/p phaco w/ IOL (OD 06/04/13, OS 05/21/13) with Dr. 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 (History)            Assessment /Plan     For exam results, see Encounter Report.    Type 2 diabetes mellitus without retinopathy  Current use of insulin  History of laser photocoagulation of retina   History of PDR s/p PRP OU. No active retinopathy noted today. Recommended pt re-establish care with retina (last saw Dr. Edmondson in 2013), scheduled for 10/23/17.    Pseudophakia of both eyes   Doing well OU. Monitor.     Regular astigmatism with presbyopia, bilateral   New glasses prescription released, adaptation expected.  Pt opts for single vision reading only. She declines distance glasses or bifocals.   Advised pt that she will need to decrease viewing distance to maximize vision with reading glasses.  Eyeglass Final Rx     Eyeglass Final Rx        Sphere Cylinder Axis    Right +2.50 +1.25 005    Left +2.25 +1.25 005    Type:  single vision reading    Expiration Date:  10/10/2018                 RTC 10/23/17 with retina specialist

## 2017-10-17 ENCOUNTER — OFFICE VISIT (OUTPATIENT)
Dept: WOUND CARE | Facility: CLINIC | Age: 72
End: 2017-10-17
Payer: MEDICARE

## 2017-10-17 VITALS
SYSTOLIC BLOOD PRESSURE: 131 MMHG | BODY MASS INDEX: 45.99 KG/M2 | TEMPERATURE: 98 F | DIASTOLIC BLOOD PRESSURE: 61 MMHG | HEART RATE: 95 BPM | HEIGHT: 67 IN | WEIGHT: 293 LBS

## 2017-10-17 DIAGNOSIS — L97.912 ULCER OF RIGHT LOWER EXTREMITY WITH FAT LAYER EXPOSED: ICD-10-CM

## 2017-10-17 DIAGNOSIS — E88.09 HYPOALBUMINEMIA: ICD-10-CM

## 2017-10-17 DIAGNOSIS — L97.921 ULCER OF LEFT LOWER EXTREMITY, LIMITED TO BREAKDOWN OF SKIN: Primary | ICD-10-CM

## 2017-10-17 DIAGNOSIS — L97.512 SKIN ULCER OF TOE OF RIGHT FOOT WITH FAT LAYER EXPOSED: ICD-10-CM

## 2017-10-17 DIAGNOSIS — I87.2 VENOUS INSUFFICIENCY OF BOTH LOWER EXTREMITIES: ICD-10-CM

## 2017-10-17 DIAGNOSIS — R60.0 BILATERAL LEG EDEMA: ICD-10-CM

## 2017-10-17 DIAGNOSIS — B35.3 TINEA PEDIS OF BOTH FEET: Chronic | ICD-10-CM

## 2017-10-17 PROCEDURE — 99999 PR PBB SHADOW E&M-EST. PATIENT-LVL V: CPT | Mod: PBBFAC,,, | Performed by: NURSE PRACTITIONER

## 2017-10-17 PROCEDURE — 99499 UNLISTED E&M SERVICE: CPT | Mod: S$GLB,,, | Performed by: NURSE PRACTITIONER

## 2017-10-17 PROCEDURE — 29580 STRAPPING UNNA BOOT: CPT | Mod: 50,S$GLB,, | Performed by: NURSE PRACTITIONER

## 2017-10-17 NOTE — PROGRESS NOTES
Subjective:       Patient ID: Sherin Ortiz is a 72 y.o. female.    Chief Complaint: Wound Check    Wound Check     Wound Check:   This patient is seen today for reevaluation of wounds to bilateral lower legs and the right second toe. An unna boot was on the legs but she removed it before coming to clinic today to shower. The wounds are slowly healing as evidenced by wound contracture.  Problems that interfere with wound healing include multiple co-morbidities, diabetes, edema, diabetic neuropathy and  morbid obesity. The patient is afebrile. The patient ambulates with great difficulty secondary to her body habitus.  She has no pain.  She denies increased swelling, redness or purulent drainage.   She is afebrile.   Review of Systems   Constitutional: Positive for unexpected weight change (24 pound weight gain since 6/23/17). Negative for chills, diaphoresis and fever.   HENT: Negative for hearing loss, postnasal drip, rhinorrhea, sinus pressure, sneezing, sore throat, tinnitus and trouble swallowing.    Eyes: Negative for visual disturbance.   Respiratory: Negative for apnea, cough, shortness of breath and wheezing.    Cardiovascular: Positive for leg swelling. Negative for chest pain and palpitations.   Gastrointestinal: Negative for constipation, diarrhea, nausea and vomiting.   Genitourinary: Negative for difficulty urinating, dysuria, frequency and hematuria.   Musculoskeletal: Negative for arthralgias, back pain and joint swelling.   Skin: Positive for wound.   Neurological: Negative for dizziness, weakness, light-headedness and headaches.   Hematological: Does not bruise/bleed easily.   Psychiatric/Behavioral: Negative for confusion, decreased concentration, dysphoric mood and sleep disturbance. The patient is not nervous/anxious.    All other systems reviewed and are negative.      Objective:      Physical Exam   Constitutional: She is oriented to person, place, and time. No distress.   Morbidly obese    HENT:   Head: Normocephalic and atraumatic.   Neck: Normal range of motion. Neck supple.   Pulmonary/Chest: Effort normal.   Musculoskeletal: Normal range of motion. She exhibits edema. She exhibits no tenderness.        Legs:       Feet:    Neurological: She is alert and oriented to person, place, and time.   Skin: Skin is warm and dry. Rash (dermatitis and tinea bilateral lower extremities) noted. She is not diaphoretic. No cyanosis or erythema. No pallor. Nails show no clubbing.   Psychiatric: She has a normal mood and affect. Her behavior is normal. Judgment and thought content normal.   Nursing note and vitals reviewed.    ..  Hemoglobin A1C   Date Value Ref Range Status   10/09/2017 9.9 (H) 4.0 - 5.6 % Final     Comment:     According to ADA guidelines, hemoglobin A1c <7.0% represents  optimal control in non-pregnant diabetic patients. Different  metrics may apply to specific patient populations.   Standards of Medical Care in Diabetes-2016.  For the purpose of screening for the presence of diabetes:  <5.7%     Consistent with the absence of diabetes  5.7-6.4%  Consistent with increasing risk for diabetes   (prediabetes)  >or=6.5%  Consistent with diabetes  Currently, no consensus exists for use of hemoglobin A1c  for diagnosis of diabetes for children.  This Hemoglobin A1c assay has significant interference with fetal   hemoglobin   (HbF). The results are invalid for patients with abnormal amounts of   HbF,   including those with known Hereditary Persistence   of Fetal Hemoglobin. Heterozygous hemoglobin variants (HbAS, HbAC,   HbAD, HbAE, HbA2) do not significantly interfere with this assay;   however, presence of multiple variants in a sample may impact the %   interference.     07/06/2017 10.0 (H) 4.0 - 5.6 % Final     Comment:     According to ADA guidelines, hemoglobin A1c <7.0% represents  optimal control in non-pregnant diabetic patients. Different  metrics may apply to specific patient populations.    Standards of Medical Care in Diabetes-2016.  For the purpose of screening for the presence of diabetes:  <5.7%     Consistent with the absence of diabetes  5.7-6.4%  Consistent with increasing risk for diabetes   (prediabetes)  >or=6.5%  Consistent with diabetes  Currently, no consensus exists for use of hemoglobin A1c  for diagnosis of diabetes for children.  This Hemoglobin A1c assay has significant interference with fetal   hemoglobin   (HbF). The results are invalid for patients with abnormal amounts of   HbF,   including those with known Hereditary Persistence   of Fetal Hemoglobin. Heterozygous hemoglobin variants (HbAS, HbAC,   HbAD, HbAE, HbA2) do not significantly interfere with this assay;   however, presence of multiple variants in a sample may impact the %   interference.     03/08/2017 9.6 (H) 4.5 - 6.2 % Final     Comment:     According to ADA guidelines, hemoglobin A1C <7.0% represents  optimal control in non-pregnant diabetic patients.  Different  metrics may apply to specific populations.   Standards of Medical Care in Diabetes - 2016.  For the purpose of screening for the presence of diabetes:  <5.7%     Consistent with the absence of diabetes  5.7-6.4%  Consistent with increasing risk for diabetes   (prediabetes)  >or=6.5%  Consistent with diabetes  Currently no consensus exists for use of hemoglobin A1C  for diagnosis of diabetes for children.       ..  Lab Results   Component Value Date    ALBUMIN 3.0 (L) 10/09/2017     Sherin was seen in the clinic room and placed in the supine position on the treatment table.  Both lower legs were cleansed with Easi-clense sponges and dried thoroughly.  Medihoney gel and a hydrofiber dressing was applied to the bilateral leg wounds.  Eucerin cream was applied to the lower legs.  The ankles were padded with ABD pads.  The patient's feet was positioned at a 90 degree angle.  A zinc oxide wrap, followed by kerlix roll gauze and coban were applied using a spiral  technique avoiding creases or folds.  The wraps were started behind the first metatarsal and ended below the tibial tubercle of the knee.  There was overlap of each turn half the width of the previous turn.  The compression wraps will be changed every 3-4 days.      Assessment:       1. Ulcer of left lower extremity, limited to breakdown of skin    2. Ulcer of right lower extremity with fat layer exposed    3. Skin ulcer of toe of right foot with fat layer exposed    4. Venous insufficiency of both lower extremities    5. Uncontrolled type 2 diabetes mellitus with complication, with long-term current use of insulin    6. Tinea pedis of both feet    7. Bilateral leg edema    8. Hypoalbuminemia        Plan:           Spectazole cream to feet and toes.  Triamcinolone cream to lower legs.  Unna boot bilateral lower legs as detailed above.  Patient was warned not to get the dressings wet and to use cast covers for showering.  Should the dressing become wet, she is to remove it, place a wet-to-dry dressing over the wound, cover with gauze and roll gauze and use ace wraps for compression and to secure bandages.  She should then notify home health as soon as possible to have a new dressing applied.  Apply medihoney gel and hydrofiber dressing to bilateral foot and toe ulcers, cotton in between toes, cover with gauze and secure with roll gauze.  Change dressing twice weekly.  Return to clinic in 2 weeks.  [facility] notified of orders via email.      Home Health Wound Care Orders.  Cleanse wounds with wound .  Spectazole cream to feet and toes.  Triamcinolone cream to lower legs.  Medihoney gel and hydrofiber dressing to bilateral leg wounds.  ABD pad to both ankles and right shin.  Unna boot (zinc oxide, kerlix and coban) bilateral lower legs.  Medihoney gel and hydrofiber to right second toe ulcers, cotton in between toes, cover with gauze and secure with roll gauze.  Change toe dressing twice weekly.  Change leg  wraps twice weekly.    Right lateral leg      Left medial leg      Right second toe

## 2017-10-22 ENCOUNTER — TELEPHONE (OUTPATIENT)
Dept: INTERNAL MEDICINE | Facility: CLINIC | Age: 72
End: 2017-10-22

## 2017-10-25 NOTE — TELEPHONE ENCOUNTER
Patient is taken Lantus 35 units every evening, Pt Is not willing to do  Novolog pen 3 units with every meal , she states she will just diet. Please advise

## 2017-10-27 RX ORDER — CLOPIDOGREL BISULFATE 75 MG/1
TABLET ORAL
Qty: 90 TABLET | Refills: 3 | Status: SHIPPED | OUTPATIENT
Start: 2017-10-27 | End: 2019-02-23 | Stop reason: SDUPTHER

## 2017-11-14 ENCOUNTER — OFFICE VISIT (OUTPATIENT)
Dept: WOUND CARE | Facility: CLINIC | Age: 72
End: 2017-11-14
Payer: MEDICARE

## 2017-11-14 VITALS
SYSTOLIC BLOOD PRESSURE: 136 MMHG | TEMPERATURE: 97 F | HEIGHT: 67 IN | HEART RATE: 85 BPM | WEIGHT: 293 LBS | BODY MASS INDEX: 45.99 KG/M2 | DIASTOLIC BLOOD PRESSURE: 78 MMHG

## 2017-11-14 DIAGNOSIS — I87.2 VENOUS STASIS DERMATITIS OF BOTH LOWER EXTREMITIES: ICD-10-CM

## 2017-11-14 DIAGNOSIS — L97.921 ULCER OF LEFT LOWER EXTREMITY, LIMITED TO BREAKDOWN OF SKIN: Primary | ICD-10-CM

## 2017-11-14 DIAGNOSIS — B35.3 TINEA PEDIS OF BOTH FEET: Chronic | ICD-10-CM

## 2017-11-14 DIAGNOSIS — L97.912 ULCER OF RIGHT LOWER EXTREMITY WITH FAT LAYER EXPOSED: ICD-10-CM

## 2017-11-14 DIAGNOSIS — L97.512 SKIN ULCER OF TOE OF RIGHT FOOT WITH FAT LAYER EXPOSED: ICD-10-CM

## 2017-11-14 DIAGNOSIS — L97.522 SKIN ULCER OF TOE OF LEFT FOOT WITH FAT LAYER EXPOSED: ICD-10-CM

## 2017-11-14 DIAGNOSIS — R60.0 BILATERAL LEG EDEMA: ICD-10-CM

## 2017-11-14 PROCEDURE — 29580 STRAPPING UNNA BOOT: CPT | Mod: 50,S$GLB,, | Performed by: NURSE PRACTITIONER

## 2017-11-14 PROCEDURE — 99999 PR PBB SHADOW E&M-EST. PATIENT-LVL V: CPT | Mod: PBBFAC,,, | Performed by: NURSE PRACTITIONER

## 2017-11-14 PROCEDURE — 99499 UNLISTED E&M SERVICE: CPT | Mod: S$GLB,,, | Performed by: NURSE PRACTITIONER

## 2017-11-14 NOTE — PATIENT INSTRUCTIONS
Elevate legs as much as possible. Do not get the dressings wet and use cast covers for showering.  Should the dressing become wet, remove it, place a wet-to-dry dressing over the wound, cover with gauze and roll gauze and use ace wraps for compression and to secure bandages.  Notify home health as soon as possible to have a new dressing applied.      Medihoney gel, cotton in between toes, cover with gauze and secure with roll gauze.  Change toe dressing daily.

## 2017-11-14 NOTE — PROGRESS NOTES
Subjective:       Patient ID: Sherin Ortiz is a 72 y.o. female.    Chief Complaint: Wound Check    Wound Check     Wound Check:   This patient is seen today for reevaluation of wounds to bilateral lower legs and the right second toe. An unna boot was on the legs but she removed it before coming to clinic today to shower. The wounds are slowly healing as evidenced by wound contracture.  Problems that interfere with wound healing include multiple co-morbidities, diabetes, edema, diabetic neuropathy and  morbid obesity. The patient is afebrile. The patient ambulates with great difficulty secondary to her body habitus.  She has no pain.  She denies increased swelling, redness or purulent drainage.   She is afebrile.   Review of Systems   Constitutional: Positive for unexpected weight change (24 pound weight gain since 6/23/17). Negative for chills, diaphoresis and fever.   HENT: Negative for hearing loss, postnasal drip, rhinorrhea, sinus pressure, sneezing, sore throat, tinnitus and trouble swallowing.    Eyes: Negative for visual disturbance.   Respiratory: Negative for apnea, cough, shortness of breath and wheezing.    Cardiovascular: Positive for leg swelling. Negative for chest pain and palpitations.   Gastrointestinal: Negative for constipation, diarrhea, nausea and vomiting.   Genitourinary: Negative for difficulty urinating, dysuria, frequency and hematuria.   Musculoskeletal: Negative for arthralgias, back pain and joint swelling.   Skin: Positive for wound.   Neurological: Negative for dizziness, weakness, light-headedness and headaches.   Hematological: Does not bruise/bleed easily.   Psychiatric/Behavioral: Negative for confusion, decreased concentration, dysphoric mood and sleep disturbance. The patient is not nervous/anxious.    All other systems reviewed and are negative.      Objective:      Physical Exam   Constitutional: She is oriented to person, place, and time. No distress.   Morbidly obese    HENT:   Head: Normocephalic and atraumatic.   Neck: Normal range of motion. Neck supple.   Pulmonary/Chest: Effort normal.   Musculoskeletal: Normal range of motion. She exhibits edema. She exhibits no tenderness.        Legs:       Feet:    Neurological: She is alert and oriented to person, place, and time.   Skin: Skin is warm and dry. Rash (dermatitis and tinea bilateral lower extremities) noted. She is not diaphoretic. No cyanosis or erythema. No pallor. Nails show no clubbing.   Psychiatric: She has a normal mood and affect. Her behavior is normal. Judgment and thought content normal.   Nursing note and vitals reviewed.    ..  Hemoglobin A1C   Date Value Ref Range Status   10/09/2017 9.9 (H) 4.0 - 5.6 % Final     Comment:     According to ADA guidelines, hemoglobin A1c <7.0% represents  optimal control in non-pregnant diabetic patients. Different  metrics may apply to specific patient populations.   Standards of Medical Care in Diabetes-2016.  For the purpose of screening for the presence of diabetes:  <5.7%     Consistent with the absence of diabetes  5.7-6.4%  Consistent with increasing risk for diabetes   (prediabetes)  >or=6.5%  Consistent with diabetes  Currently, no consensus exists for use of hemoglobin A1c  for diagnosis of diabetes for children.  This Hemoglobin A1c assay has significant interference with fetal   hemoglobin   (HbF). The results are invalid for patients with abnormal amounts of   HbF,   including those with known Hereditary Persistence   of Fetal Hemoglobin. Heterozygous hemoglobin variants (HbAS, HbAC,   HbAD, HbAE, HbA2) do not significantly interfere with this assay;   however, presence of multiple variants in a sample may impact the %   interference.     07/06/2017 10.0 (H) 4.0 - 5.6 % Final     Comment:     According to ADA guidelines, hemoglobin A1c <7.0% represents  optimal control in non-pregnant diabetic patients. Different  metrics may apply to specific patient populations.    Standards of Medical Care in Diabetes-2016.  For the purpose of screening for the presence of diabetes:  <5.7%     Consistent with the absence of diabetes  5.7-6.4%  Consistent with increasing risk for diabetes   (prediabetes)  >or=6.5%  Consistent with diabetes  Currently, no consensus exists for use of hemoglobin A1c  for diagnosis of diabetes for children.  This Hemoglobin A1c assay has significant interference with fetal   hemoglobin   (HbF). The results are invalid for patients with abnormal amounts of   HbF,   including those with known Hereditary Persistence   of Fetal Hemoglobin. Heterozygous hemoglobin variants (HbAS, HbAC,   HbAD, HbAE, HbA2) do not significantly interfere with this assay;   however, presence of multiple variants in a sample may impact the %   interference.     03/08/2017 9.6 (H) 4.5 - 6.2 % Final     Comment:     According to ADA guidelines, hemoglobin A1C <7.0% represents  optimal control in non-pregnant diabetic patients.  Different  metrics may apply to specific populations.   Standards of Medical Care in Diabetes - 2016.  For the purpose of screening for the presence of diabetes:  <5.7%     Consistent with the absence of diabetes  5.7-6.4%  Consistent with increasing risk for diabetes   (prediabetes)  >or=6.5%  Consistent with diabetes  Currently no consensus exists for use of hemoglobin A1C  for diagnosis of diabetes for children.       ..  Lab Results   Component Value Date    ALBUMIN 3.0 (L) 10/09/2017     Sherin was seen in the clinic room and placed in the supine position on the treatment table.  Both lower legs were cleansed with Easi-clense sponges and dried thoroughly.  Medihoney gel and a hydrofiber dressing was applied to the bilateral leg wounds.  Eucerin cream was applied to the lower legs.  The ankles were padded with ABD pads.  The patient's feet was positioned at a 90 degree angle.  A zinc oxide wrap, followed by kerlix roll gauze and coban were applied using a spiral  technique avoiding creases or folds.  The wraps were started behind the first metatarsal and ended below the tibial tubercle of the knee.  There was overlap of each turn half the width of the previous turn.  The compression wraps will be changed every 3-4 days.      Assessment:       1. Ulcer of left lower extremity, limited to breakdown of skin    2. Ulcer of right lower extremity with fat layer exposed    3. Skin ulcer of toe of right foot with fat layer exposed    4. Skin ulcer of toe of left foot with fat layer exposed    5. Chronic venous hypertension with ulcer involving both sides    6. Tinea pedis of both feet    7. Venous stasis dermatitis of both lower extremities    8. Bilateral leg edema    9. Uncontrolled type 2 diabetes mellitus with complication, with long-term current use of insulin        Plan:           Spectazole cream to feet and toes.  Triamcinolone cream to lower legs.  Unna boot bilateral lower legs as detailed above.  Patient was warned not to get the dressings wet and to use cast covers for showering.  Should the dressing become wet, she is to remove it, place a wet-to-dry dressing over the wound, cover with gauze and roll gauze and use ace wraps for compression and to secure bandages.  She should then notify home health as soon as possible to have a new dressing applied.  Apply medihoney gel and hydrofiber dressing to bilateral foot and toe ulcers, cotton in between toes, cover with gauze and secure with roll gauze.  Change dressing twice weekly.  Return to clinic in 2 weeks.  [facility] notified of orders via email.      Home Health Wound Care Orders.  Cleanse wounds with wound .  Spectazole cream to feet and toes.  Triamcinolone cream to lower legs.  Medihoney gel and hydrofiber dressing to bilateral leg wounds.  ABD pad to both ankles and right shin.  Unna boot (zinc oxide, kerlix and coban) bilateral lower legs.  Medihoney gel, cotton in between toes, cover with gauze and secure  with roll gauze.  Change toe dressing daily.  Change leg wraps twice weekly.    Right lateral leg      Left medial leg      Right second toe       Left second toe

## 2017-11-16 ENCOUNTER — TELEPHONE (OUTPATIENT)
Dept: INTERNAL MEDICINE | Facility: CLINIC | Age: 72
End: 2017-11-16

## 2017-11-16 NOTE — TELEPHONE ENCOUNTER
----- Message from Tenzin Sorensen sent at 11/16/2017  1:58 PM CST -----  Contact: self [phone]  Patient would like a call back from the office to discuss wheelchair  . Please advise, Thanks

## 2017-11-20 ENCOUNTER — TELEPHONE (OUTPATIENT)
Dept: INTERNAL MEDICINE | Facility: CLINIC | Age: 72
End: 2017-11-20

## 2017-11-20 NOTE — TELEPHONE ENCOUNTER
----- Message from Vinh Aviles sent at 11/20/2017  7:39 AM CST -----  Contact: self/ [phone] home  Pt is calling check the status of the request made on last week to have an order put in to get a  for her electric wheelchair.  She would like to speak with someone in the office today, if possible.  Please call and advise.    Thank you

## 2017-12-11 RX ORDER — LISINOPRIL 10 MG/1
TABLET ORAL
Qty: 90 TABLET | Refills: 3 | Status: ON HOLD | OUTPATIENT
Start: 2017-12-11 | End: 2017-12-31 | Stop reason: HOSPADM

## 2017-12-11 RX ORDER — ATORVASTATIN CALCIUM 40 MG/1
TABLET, FILM COATED ORAL
Qty: 90 TABLET | Refills: 3 | Status: SHIPPED | OUTPATIENT
Start: 2017-12-11 | End: 2019-02-23 | Stop reason: SDUPTHER

## 2017-12-13 ENCOUNTER — HOSPITAL ENCOUNTER (OUTPATIENT)
Facility: HOSPITAL | Age: 72
Discharge: HOME HEALTH | End: 2017-12-15
Attending: EMERGENCY MEDICINE | Admitting: HOSPITALIST
Payer: MEDICARE

## 2017-12-13 DIAGNOSIS — E11.42 DIABETIC PERIPHERAL NEUROPATHY ASSOCIATED WITH TYPE 2 DIABETES MELLITUS: Chronic | ICD-10-CM

## 2017-12-13 DIAGNOSIS — R53.1 WEAKNESS: ICD-10-CM

## 2017-12-13 DIAGNOSIS — N10 ACUTE PYELONEPHRITIS: Primary | ICD-10-CM

## 2017-12-13 DIAGNOSIS — T68.XXXA HYPOTHERMIA: ICD-10-CM

## 2017-12-13 PROBLEM — R82.81 BACTERIURIA WITH PYURIA: Chronic | Status: ACTIVE | Noted: 2017-12-13

## 2017-12-13 PROBLEM — R82.71 BACTERIURIA WITH PYURIA: Chronic | Status: ACTIVE | Noted: 2017-12-13

## 2017-12-13 LAB
ALBUMIN SERPL BCP-MCNC: 2.8 G/DL
ALP SERPL-CCNC: 137 U/L
ALT SERPL W/O P-5'-P-CCNC: 28 U/L
ANION GAP SERPL CALC-SCNC: 10 MMOL/L
APTT BLDCRRT: 27.6 SEC
AST SERPL-CCNC: 26 U/L
BACTERIA #/AREA URNS HPF: ABNORMAL /HPF
BASOPHILS # BLD AUTO: 0.01 K/UL
BASOPHILS NFR BLD: 0.3 %
BILIRUB SERPL-MCNC: 0.4 MG/DL
BILIRUB UR QL STRIP: NEGATIVE
BNP SERPL-MCNC: 143 PG/ML
BUN SERPL-MCNC: 26 MG/DL
CALCIUM SERPL-MCNC: 9.8 MG/DL
CHLORIDE SERPL-SCNC: 110 MMOL/L
CLARITY UR: ABNORMAL
CO2 SERPL-SCNC: 21 MMOL/L
COLOR UR: YELLOW
CREAT SERPL-MCNC: 1.5 MG/DL
DIFFERENTIAL METHOD: ABNORMAL
EOSINOPHIL # BLD AUTO: 0 K/UL
EOSINOPHIL NFR BLD: 0.8 %
ERYTHROCYTE [DISTWIDTH] IN BLOOD BY AUTOMATED COUNT: 14.3 %
EST. GFR  (AFRICAN AMERICAN): 39 ML/MIN/1.73 M^2
EST. GFR  (NON AFRICAN AMERICAN): 34 ML/MIN/1.73 M^2
FLUAV AG SPEC QL IA: NEGATIVE
FLUBV AG SPEC QL IA: NEGATIVE
GLUCOSE SERPL-MCNC: 183 MG/DL
GLUCOSE UR QL STRIP: ABNORMAL
HCT VFR BLD AUTO: 35 %
HGB BLD-MCNC: 11 G/DL
HGB UR QL STRIP: ABNORMAL
INR PPP: 1
KETONES UR QL STRIP: NEGATIVE
LACTATE SERPL-SCNC: 0.7 MMOL/L
LACTATE SERPL-SCNC: 1.3 MMOL/L
LEUKOCYTE ESTERASE UR QL STRIP: ABNORMAL
LIPASE SERPL-CCNC: 34 U/L
LYMPHOCYTES # BLD AUTO: 0.8 K/UL
LYMPHOCYTES NFR BLD: 20.5 %
MAGNESIUM SERPL-MCNC: 1.6 MG/DL
MCH RBC QN AUTO: 28.4 PG
MCHC RBC AUTO-ENTMCNC: 31.4 G/DL
MCV RBC AUTO: 90 FL
MICROSCOPIC COMMENT: ABNORMAL
MONOCYTES # BLD AUTO: 0.2 K/UL
MONOCYTES NFR BLD: 6 %
NEUTROPHILS # BLD AUTO: 2.8 K/UL
NEUTROPHILS NFR BLD: 72.1 %
NITRITE UR QL STRIP: POSITIVE
PH UR STRIP: 6 [PH] (ref 5–8)
PHOSPHATE SERPL-MCNC: 2.7 MG/DL
PLATELET # BLD AUTO: 178 K/UL
PMV BLD AUTO: 9.5 FL
POCT GLUCOSE: 142 MG/DL (ref 70–110)
POCT GLUCOSE: 168 MG/DL (ref 70–110)
POTASSIUM SERPL-SCNC: 5.3 MMOL/L
PROCALCITONIN SERPL IA-MCNC: 0.02 NG/ML
PROT SERPL-MCNC: 7.5 G/DL
PROT UR QL STRIP: ABNORMAL
PROTHROMBIN TIME: 10.8 SEC
RBC # BLD AUTO: 3.87 M/UL
RBC #/AREA URNS HPF: 10 /HPF (ref 0–4)
SODIUM SERPL-SCNC: 141 MMOL/L
SP GR UR STRIP: 1.02 (ref 1–1.03)
SPECIMEN SOURCE: NORMAL
SQUAMOUS #/AREA URNS HPF: 5 /HPF
TROPONIN I SERPL DL<=0.01 NG/ML-MCNC: 0.01 NG/ML
TSH SERPL DL<=0.005 MIU/L-ACNC: 1.15 UIU/ML
URN SPEC COLLECT METH UR: ABNORMAL
UROBILINOGEN UR STRIP-ACNC: NEGATIVE EU/DL
WBC # BLD AUTO: 3.86 K/UL
WBC #/AREA URNS HPF: >100 /HPF (ref 0–5)
YEAST URNS QL MICRO: ABNORMAL

## 2017-12-13 PROCEDURE — 85610 PROTHROMBIN TIME: CPT

## 2017-12-13 PROCEDURE — 85025 COMPLETE CBC W/AUTO DIFF WBC: CPT

## 2017-12-13 PROCEDURE — 87400 INFLUENZA A/B EACH AG IA: CPT | Mod: 59

## 2017-12-13 PROCEDURE — 83735 ASSAY OF MAGNESIUM: CPT

## 2017-12-13 PROCEDURE — 85730 THROMBOPLASTIN TIME PARTIAL: CPT

## 2017-12-13 PROCEDURE — 84145 PROCALCITONIN (PCT): CPT

## 2017-12-13 PROCEDURE — 82533 TOTAL CORTISOL: CPT

## 2017-12-13 PROCEDURE — 87186 SC STD MICRODIL/AGAR DIL: CPT

## 2017-12-13 PROCEDURE — 87086 URINE CULTURE/COLONY COUNT: CPT

## 2017-12-13 PROCEDURE — 87088 URINE BACTERIA CULTURE: CPT

## 2017-12-13 PROCEDURE — 96365 THER/PROPH/DIAG IV INF INIT: CPT

## 2017-12-13 PROCEDURE — 84443 ASSAY THYROID STIM HORMONE: CPT

## 2017-12-13 PROCEDURE — 63600175 PHARM REV CODE 636 W HCPCS: Performed by: EMERGENCY MEDICINE

## 2017-12-13 PROCEDURE — 93010 ELECTROCARDIOGRAM REPORT: CPT | Mod: ,,, | Performed by: INTERNAL MEDICINE

## 2017-12-13 PROCEDURE — 63600175 PHARM REV CODE 636 W HCPCS: Performed by: HOSPITALIST

## 2017-12-13 PROCEDURE — 96361 HYDRATE IV INFUSION ADD-ON: CPT

## 2017-12-13 PROCEDURE — 80053 COMPREHEN METABOLIC PANEL: CPT

## 2017-12-13 PROCEDURE — G0378 HOSPITAL OBSERVATION PER HR: HCPCS

## 2017-12-13 PROCEDURE — 82962 GLUCOSE BLOOD TEST: CPT

## 2017-12-13 PROCEDURE — 25000003 PHARM REV CODE 250: Performed by: HOSPITALIST

## 2017-12-13 PROCEDURE — 83690 ASSAY OF LIPASE: CPT

## 2017-12-13 PROCEDURE — 93005 ELECTROCARDIOGRAM TRACING: CPT

## 2017-12-13 PROCEDURE — 84100 ASSAY OF PHOSPHORUS: CPT

## 2017-12-13 PROCEDURE — 87040 BLOOD CULTURE FOR BACTERIA: CPT | Mod: 59

## 2017-12-13 PROCEDURE — 25000003 PHARM REV CODE 250: Performed by: EMERGENCY MEDICINE

## 2017-12-13 PROCEDURE — 87077 CULTURE AEROBIC IDENTIFY: CPT

## 2017-12-13 PROCEDURE — 83880 ASSAY OF NATRIURETIC PEPTIDE: CPT

## 2017-12-13 PROCEDURE — 83605 ASSAY OF LACTIC ACID: CPT | Mod: 91

## 2017-12-13 PROCEDURE — 99285 EMERGENCY DEPT VISIT HI MDM: CPT | Mod: 25

## 2017-12-13 PROCEDURE — 87205 SMEAR GRAM STAIN: CPT

## 2017-12-13 PROCEDURE — 84484 ASSAY OF TROPONIN QUANT: CPT

## 2017-12-13 PROCEDURE — 81000 URINALYSIS NONAUTO W/SCOPE: CPT

## 2017-12-13 RX ORDER — IBUPROFEN 200 MG
16 TABLET ORAL
Status: DISCONTINUED | OUTPATIENT
Start: 2017-12-13 | End: 2017-12-15 | Stop reason: HOSPADM

## 2017-12-13 RX ORDER — ENOXAPARIN SODIUM 100 MG/ML
40 INJECTION SUBCUTANEOUS EVERY 24 HOURS
Status: DISCONTINUED | OUTPATIENT
Start: 2017-12-13 | End: 2017-12-15 | Stop reason: HOSPADM

## 2017-12-13 RX ORDER — SODIUM CHLORIDE 9 MG/ML
INJECTION, SOLUTION INTRAVENOUS CONTINUOUS
Status: DISCONTINUED | OUTPATIENT
Start: 2017-12-13 | End: 2017-12-14

## 2017-12-13 RX ORDER — GLUCAGON 1 MG
1 KIT INJECTION
Status: DISCONTINUED | OUTPATIENT
Start: 2017-12-13 | End: 2017-12-15 | Stop reason: HOSPADM

## 2017-12-13 RX ORDER — ACETAMINOPHEN 325 MG/1
650 TABLET ORAL EVERY 6 HOURS PRN
Status: DISCONTINUED | OUTPATIENT
Start: 2017-12-13 | End: 2017-12-15 | Stop reason: HOSPADM

## 2017-12-13 RX ORDER — SODIUM CHLORIDE 0.9 % (FLUSH) 0.9 %
5 SYRINGE (ML) INJECTION
Status: DISCONTINUED | OUTPATIENT
Start: 2017-12-13 | End: 2017-12-15 | Stop reason: HOSPADM

## 2017-12-13 RX ORDER — INSULIN ASPART 100 [IU]/ML
0-5 INJECTION, SOLUTION INTRAVENOUS; SUBCUTANEOUS
Status: DISCONTINUED | OUTPATIENT
Start: 2017-12-13 | End: 2017-12-15 | Stop reason: HOSPADM

## 2017-12-13 RX ORDER — IBUPROFEN 200 MG
24 TABLET ORAL
Status: DISCONTINUED | OUTPATIENT
Start: 2017-12-13 | End: 2017-12-15 | Stop reason: HOSPADM

## 2017-12-13 RX ADMIN — SODIUM CHLORIDE: 0.9 INJECTION, SOLUTION INTRAVENOUS at 11:12

## 2017-12-13 RX ADMIN — CEFTRIAXONE SODIUM 2 G: 2 INJECTION, POWDER, FOR SOLUTION INTRAMUSCULAR; INTRAVENOUS at 07:12

## 2017-12-13 RX ADMIN — ENOXAPARIN SODIUM 40 MG: 100 INJECTION SUBCUTANEOUS at 11:12

## 2017-12-13 RX ADMIN — SODIUM CHLORIDE 1000 ML: 0.9 INJECTION, SOLUTION INTRAVENOUS at 05:12

## 2017-12-13 NOTE — ED PROVIDER NOTES
Encounter Date: 12/13/2017       History     Chief Complaint   Patient presents with    Fatigue     c/o fatigue, weakness, and decreased appetite x2 days. Daughter states when pt has these symptoms she normally has a kidney infection     The patient presents to emergency Department with fatigue and weakness today.  The patient states she has dizziness and tremors.  She denies any nausea or vomiting but she has had diarrhea.  She has not been febrile but her daughter states that her temp was 94 and this is consistent with her low temp when she has a kidney infection.  She has had decreased appetite along with weakness, no chest pain, no shortness of breath, no abdominal pain.  The patient had a flu shot this year.  She has had 5-6 kidney infections in the past.  She also has a history of diabetes hypertension and has diabetic wounds on both of her legs and feet, these are being evaluated and care for by [facility].  She was scheduled to have her wounds checked and redressed on Friday.          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    Benign essential hypertension 11/14/2012    Cataract     s/p phacoemulsification    Chronic kidney disease (CKD), stage III (moderate) 9/12/2013    Diabetic peripheral neuropathy associated with type 2 diabetes mellitus 11/14/2014    causing right hemiparesis    Gait disorder     Hyperlipidemia     Iritis - Both Eyes 6/10/2013    Kidney stone     Lymphedema     Morbid obesity with BMI of 40.0-44.9, adult 2/18/2015    NS (nuclear sclerosis) 4/1/2013    Nuclear sclerosis - Both Eyes 4/29/2013    Preseptal  cellulitis - Right Eye 4/29/2013    Proliferative diabetic retinopathy - Both Eyes 4/29/2013    Proliferative diabetic retinopathy, both eyes 4/1/2013    PSC (posterior subcapsular cataract) - Both Eyes 4/29/2013    S/P hernia repair 12/19/2012    TIA (transient ischemic attack) 11/18/2014    Tinea pedis 7/24/2012    Tinea pedis is present on both feet.     Type 2 diabetes mellitus with renal manifestations, controlled 12/12/2013    Type 2 diabetes, controlled, with moderate nonproliferative diabetic retinopathy without macular edema 9/17/2015    Ulcer of left lower extremity, limited to breakdown of skin 7/8/2015    Unspecified cerebral artery occlusion with cerebral infarction 11/16/2014    Unspecified venous (peripheral) insufficiency     UTI (lower urinary tract infection)     Vaginal infection     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     Past Surgical History:   Procedure Laterality Date    APPENDECTOMY      CATARACT EXTRACTION W/  INTRAOCULAR LENS IMPLANT Left 5/21/2013    CATARACT EXTRACTION W/  INTRAOCULAR LENS IMPLANT Right 6/4/2013    CHOLECYSTECTOMY      COLONOSCOPY  12/22/2005    normal    ESOPHAGOGASTRODUODENOSCOPY  12/21/2015    hiatal hernia, Schatzki ring    EYE SURGERY Bilateral 2008    laser surgery both eyes    NASAL SEPTUM SURGERY      SUBTOTAL COLECTOMY  12/13/2012    transverse colon, for incarcerated umbilical hernia, Dr. Kat Bower     Family History   Problem Relation Age of Onset    Diabetes Sister     Cataracts Sister     Heart disease Brother     Cataracts Brother     Leukemia Mother     Cancer Neg Hx     Amblyopia Neg Hx     Blindness Neg Hx     Glaucoma Neg Hx     Hypertension Neg Hx     Macular degeneration Neg Hx     Retinal detachment Neg Hx     Strabismus Neg Hx     Stroke Neg Hx     Thyroid disease Neg Hx     Kidney disease Neg Hx      Social History   Substance Use Topics    Smoking status: Former Smoker     Packs/day: 0.50      Years: 15.00     Types: Cigarettes     Quit date: 7/9/1982    Smokeless tobacco: Former User      Comment: smoked one pack per week    Alcohol use No     Review of Systems   Constitutional: Positive for activity change, appetite change, fatigue and fever.   HENT: Negative for sore throat.    Respiratory: Negative for shortness of breath.    Cardiovascular: Negative for chest pain.   Gastrointestinal: Positive for diarrhea. Negative for abdominal pain, blood in stool, constipation, nausea and vomiting.   Endocrine: Positive for cold intolerance. Negative for heat intolerance, polydipsia, polyphagia and polyuria.   Genitourinary: Negative for decreased urine volume, dysuria, flank pain and pelvic pain.   Musculoskeletal: Negative for back pain, neck pain and neck stiffness.   Skin: Positive for wound. Negative for rash.   Neurological: Positive for dizziness, tremors and light-headedness. Negative for seizures, syncope, facial asymmetry, speech difficulty, weakness, numbness and headaches.   Hematological: Negative for adenopathy. Does not bruise/bleed easily.   Psychiatric/Behavioral: Negative for behavioral problems, confusion and decreased concentration. The patient is not nervous/anxious.        Physical Exam     Initial Vitals [12/13/17 1529]   BP Pulse Resp Temp SpO2   (!) 168/73 77 20 (!) 94.5 °F (34.7 °C) 98 %      MAP       104.67         Physical Exam    Nursing note and vitals reviewed.  Constitutional: She appears well-developed and well-nourished.   Obese   HENT:   Head: Normocephalic and atraumatic.   Mouth/Throat: Oropharynx is clear and moist.   Eyes: Conjunctivae and EOM are normal. Pupils are equal, round, and reactive to light.   Neck: Normal range of motion. Neck supple.   Cardiovascular: Normal rate, regular rhythm, normal heart sounds and intact distal pulses. Exam reveals no gallop and no friction rub.    No murmur heard.  Pulmonary/Chest: Breath sounds normal.   Abdominal: Soft. Bowel sounds  are normal. She exhibits no distension. There is no tenderness. There is no rebound and no guarding.   Musculoskeletal: Normal range of motion. She exhibits no edema or tenderness.   Lymphadenopathy:     She has no cervical adenopathy.   Neurological: She is alert and oriented to person, place, and time. She has normal strength and normal reflexes.   Skin: Skin is warm and dry.   Bilateral lower extremities covered in unna boots.  The unna boots were not taken down.  Toes are not erythematous, they're mildly edematous.   Psychiatric: She has a normal mood and affect. Her behavior is normal. Judgment and thought content normal.         ED Course   Critical Care  Date/Time: 12/13/2017 4:32 PM  Performed by: AMALIA SMITH  Authorized by: AMALIA SMITH   Total critical care time (exclusive of procedural time) : 30 minutes  Critical care was necessary to treat or prevent imminent or life-threatening deterioration of the following conditions: sepsis.  Critical care was time spent personally by me on the following activities: discussions with consultants, evaluation of patient's response to treatment, obtaining history from patient or surrogate, ordering and review of laboratory studies, pulse oximetry, review of old charts, re-evaluation of patient's condition, ordering and review of radiographic studies, ordering and performing treatments and interventions, examination of patient and development of treatment plan with patient or surrogate.        Labs Reviewed   POCT GLUCOSE - Abnormal; Notable for the following:        Result Value    POCT Glucose 168 (*)     All other components within normal limits   CULTURE, BLOOD   CULTURE, BLOOD   CULTURE, URINE   APTT   B-TYPE NATRIURETIC PEPTIDE   CBC W/ AUTO DIFFERENTIAL   COMPREHENSIVE METABOLIC PANEL   CORTISOL, RANDOM   LACTIC ACID, PLASMA   LACTIC ACID, PLASMA   LIPASE   MAGNESIUM   PHOSPHORUS   PROTIME-INR   PROCALCITONIN   TROPONIN I   TSH   URINALYSIS   INFLUENZA A AND B  ANTIGEN     EKG Readings: (Independently Interpreted)   Initial Reading: No STEMI. Rhythm: Normal Sinus Rhythm. Heart Rate: 71. Conduction: RBBB (LAFB).   1640          Medical Decision Making:   Clinical Tests:   Lab Tests: Ordered and Reviewed  The following lab test(s) were unremarkable: CBC, CMP, Urinalysis, BNP, Troponin, Lactate, Lipase and PT  Radiological Study: Ordered and Reviewed  Medical Tests: Ordered and Reviewed  ED Management:  The patient has symptoms consistent with her previous pyelonephritis episodes in the past.  1840: The patient's urinalysis is greater than 100 white cells.  She is weak and lethargic.  She is possibly septic at this time.  She will be admitted to Dr. Bloom [facility] hospitalist service. She is allergic to PCN and sulfa, I will gvie her rocephin IV and observe her closely for any allergic reactions. Pharmacy states she has had rocephin in the past without problems.  Other:   I have discussed this case with another health care provider.                   ED Course      Clinical Impression:   The primary encounter diagnosis was Acute pyelonephritis. A diagnosis of Weakness was also pertinent to this visit.                           Hyun Kc MD  12/13/17 1844       Hyun Kc MD  12/13/17 2113

## 2017-12-13 NOTE — ED NOTES
Pt sitting up in bed, LOUIE melo's 3, daughter at bedside. Pt stated that she came to the ER with c/o fatigue and weakness for several days. Her daughter stated that she often starts out this way when she gets a bladder infection. Pt denies CP, SOB, N/V, dysuria or fever.  Pt has BLE diabetic ulcers to her feet that she goes to wound care.  APPEARANCE: Alert, oriented and in no acute distress.  CARDIAC: Normal rate and rhythm, no murmur heard.   PERIPHERAL VASCULAR: peripheral pulses present. Normal cap refill. No edema. Warm to touch.    RESPIRATORY:Normal rate and effort, breath sounds clear bilaterally throughout chest. Respirations are equal and unlabored no obvious signs of distress.  GASTRO: soft, bowel sounds normal, no tenderness, no abdominal distention.  MUSC: Full ROM. No bony tenderness or soft tissue tenderness. No obvious deformity.  SKIN: Skin is warm and dry, normal skin turgor, mucous membranes moist.  NEURO: 5/5 strength major flexors/extensors bilaterally. Sensory intact to light touch bilaterally. Antionette coma scale: eyes open spontaneously-4, oriented & converses-5, obeys commands-6. No neurological abnormalities.   MENTAL STATUS: awake, alert and aware of environment.  EYE: PERRL, both eyes: pupils brisk and reactive to light. Normal size.  ENT: EARS: no obvious drainage. NOSE: no active bleeding.

## 2017-12-14 PROBLEM — S46.911A RIGHT SHOULDER STRAIN, INITIAL ENCOUNTER: Status: ACTIVE | Noted: 2017-12-14

## 2017-12-14 PROBLEM — N39.0 RECURRENT UTI: Status: ACTIVE | Noted: 2017-12-14

## 2017-12-14 LAB
ANION GAP SERPL CALC-SCNC: 7 MMOL/L
BASOPHILS # BLD AUTO: 0.01 K/UL
BASOPHILS NFR BLD: 0.3 %
BUN SERPL-MCNC: 22 MG/DL
CALCIUM SERPL-MCNC: 9.1 MG/DL
CHLORIDE SERPL-SCNC: 112 MMOL/L
CO2 SERPL-SCNC: 25 MMOL/L
CORTIS SERPL-MCNC: 15.1 UG/DL
CREAT SERPL-MCNC: 1.3 MG/DL
DIFFERENTIAL METHOD: ABNORMAL
EOSINOPHIL # BLD AUTO: 0 K/UL
EOSINOPHIL NFR BLD: 1.2 %
ERYTHROCYTE [DISTWIDTH] IN BLOOD BY AUTOMATED COUNT: 14.8 %
EST. GFR  (AFRICAN AMERICAN): 47 ML/MIN/1.73 M^2
EST. GFR  (NON AFRICAN AMERICAN): 41 ML/MIN/1.73 M^2
GLUCOSE SERPL-MCNC: 117 MG/DL
GRAM STN SPEC: NORMAL
HCT VFR BLD AUTO: 34.4 %
HGB BLD-MCNC: 11 G/DL
LYMPHOCYTES # BLD AUTO: 0.9 K/UL
LYMPHOCYTES NFR BLD: 25.7 %
MCH RBC QN AUTO: 29.1 PG
MCHC RBC AUTO-ENTMCNC: 32 G/DL
MCV RBC AUTO: 91 FL
MONOCYTES # BLD AUTO: 0.3 K/UL
MONOCYTES NFR BLD: 7.7 %
NEUTROPHILS # BLD AUTO: 2.2 K/UL
NEUTROPHILS NFR BLD: 65.1 %
PLATELET # BLD AUTO: 185 K/UL
PMV BLD AUTO: 9.7 FL
POCT GLUCOSE: 101 MG/DL (ref 70–110)
POCT GLUCOSE: 170 MG/DL (ref 70–110)
POCT GLUCOSE: 177 MG/DL (ref 70–110)
POCT GLUCOSE: 98 MG/DL (ref 70–110)
POTASSIUM SERPL-SCNC: 5.1 MMOL/L
RBC # BLD AUTO: 3.78 M/UL
SODIUM SERPL-SCNC: 144 MMOL/L
WBC # BLD AUTO: 3.38 K/UL

## 2017-12-14 PROCEDURE — G0378 HOSPITAL OBSERVATION PER HR: HCPCS

## 2017-12-14 PROCEDURE — 97165 OT EVAL LOW COMPLEX 30 MIN: CPT

## 2017-12-14 PROCEDURE — 80048 BASIC METABOLIC PNL TOTAL CA: CPT

## 2017-12-14 PROCEDURE — 63600175 PHARM REV CODE 636 W HCPCS: Performed by: NURSE PRACTITIONER

## 2017-12-14 PROCEDURE — 97161 PT EVAL LOW COMPLEX 20 MIN: CPT

## 2017-12-14 PROCEDURE — 25000003 PHARM REV CODE 250: Performed by: NURSE PRACTITIONER

## 2017-12-14 PROCEDURE — 63600175 PHARM REV CODE 636 W HCPCS: Performed by: HOSPITALIST

## 2017-12-14 PROCEDURE — G8988 SELF CARE GOAL STATUS: HCPCS | Mod: CK

## 2017-12-14 PROCEDURE — G8987 SELF CARE CURRENT STATUS: HCPCS | Mod: CL

## 2017-12-14 PROCEDURE — G8978 MOBILITY CURRENT STATUS: HCPCS | Mod: CL

## 2017-12-14 PROCEDURE — 97530 THERAPEUTIC ACTIVITIES: CPT

## 2017-12-14 PROCEDURE — 94761 N-INVAS EAR/PLS OXIMETRY MLT: CPT

## 2017-12-14 PROCEDURE — 25000003 PHARM REV CODE 250: Performed by: HOSPITALIST

## 2017-12-14 PROCEDURE — 36415 COLL VENOUS BLD VENIPUNCTURE: CPT

## 2017-12-14 PROCEDURE — G8979 MOBILITY GOAL STATUS: HCPCS | Mod: CK

## 2017-12-14 PROCEDURE — G8980 MOBILITY D/C STATUS: HCPCS | Mod: CL

## 2017-12-14 PROCEDURE — 85025 COMPLETE CBC W/AUTO DIFF WBC: CPT

## 2017-12-14 PROCEDURE — G8989 SELF CARE D/C STATUS: HCPCS | Mod: CL

## 2017-12-14 RX ORDER — TRIAMCINOLONE ACETONIDE 1 MG/G
CREAM TOPICAL
Status: DISCONTINUED | OUTPATIENT
Start: 2017-12-14 | End: 2017-12-15 | Stop reason: HOSPADM

## 2017-12-14 RX ORDER — CLOPIDOGREL BISULFATE 75 MG/1
75 TABLET ORAL DAILY
Status: DISCONTINUED | OUTPATIENT
Start: 2017-12-14 | End: 2017-12-15 | Stop reason: HOSPADM

## 2017-12-14 RX ORDER — DOXYLAMINE SUCCINATE 25 MG
TABLET ORAL 2 TIMES DAILY
Status: DISCONTINUED | OUTPATIENT
Start: 2017-12-14 | End: 2017-12-15 | Stop reason: HOSPADM

## 2017-12-14 RX ORDER — NAPROXEN SODIUM 220 MG/1
81 TABLET, FILM COATED ORAL DAILY
Status: DISCONTINUED | OUTPATIENT
Start: 2017-12-14 | End: 2017-12-15 | Stop reason: HOSPADM

## 2017-12-14 RX ORDER — ATORVASTATIN CALCIUM 40 MG/1
40 TABLET, FILM COATED ORAL NIGHTLY
Status: DISCONTINUED | OUTPATIENT
Start: 2017-12-14 | End: 2017-12-15 | Stop reason: HOSPADM

## 2017-12-14 RX ORDER — HYDROCHLOROTHIAZIDE 25 MG/1
25 TABLET ORAL DAILY
Status: DISCONTINUED | OUTPATIENT
Start: 2017-12-14 | End: 2017-12-15 | Stop reason: HOSPADM

## 2017-12-14 RX ORDER — CYCLOBENZAPRINE HCL 5 MG
5 TABLET ORAL 2 TIMES DAILY PRN
Status: DISCONTINUED | OUTPATIENT
Start: 2017-12-14 | End: 2017-12-15 | Stop reason: HOSPADM

## 2017-12-14 RX ADMIN — CEFTRIAXONE 2 G: 2 INJECTION, SOLUTION INTRAVENOUS at 06:12

## 2017-12-14 RX ADMIN — ASPIRIN 81 MG 81 MG: 81 TABLET ORAL at 08:12

## 2017-12-14 RX ADMIN — MICONAZOLE NITRATE: 20 CREAM TOPICAL at 10:12

## 2017-12-14 RX ADMIN — INSULIN DETEMIR 20 UNITS: 100 INJECTION, SOLUTION SUBCUTANEOUS at 08:12

## 2017-12-14 RX ADMIN — HYDROCHLOROTHIAZIDE 25 MG: 25 TABLET ORAL at 08:12

## 2017-12-14 RX ADMIN — TRIAMCINOLONE ACETONIDE: 1 CREAM TOPICAL at 10:12

## 2017-12-14 RX ADMIN — ENOXAPARIN SODIUM 40 MG: 100 INJECTION SUBCUTANEOUS at 05:12

## 2017-12-14 RX ADMIN — CLOPIDOGREL BISULFATE 75 MG: 75 TABLET ORAL at 08:12

## 2017-12-14 RX ADMIN — ACETAMINOPHEN 650 MG: 325 TABLET ORAL at 08:12

## 2017-12-14 RX ADMIN — ATORVASTATIN CALCIUM 40 MG: 40 TABLET, FILM COATED ORAL at 08:12

## 2017-12-14 RX ADMIN — MICONAZOLE NITRATE: 20 CREAM TOPICAL at 08:12

## 2017-12-14 RX ADMIN — ATORVASTATIN CALCIUM 40 MG: 40 TABLET, FILM COATED ORAL at 01:12

## 2017-12-14 NOTE — ASSESSMENT & PLAN NOTE
Patient's daughter reports that patient has chronic hypothermia.  Treat UTI.  Apply blankets or Behr hugger as needed.  Continue to monitor.

## 2017-12-14 NOTE — PLAN OF CARE
12/14/17 1127   Discharge Assessment   Assessment Type Discharge Planning Assessment   Confirmed/corrected address and phone number on facesheet? Yes   Assessment information obtained from? Patient   Expected Length of Stay (days) 1   Communicated expected length of stay with patient/caregiver yes   Prior to hospitilization cognitive status: Alert/Oriented   Prior to hospitalization functional status: Assistive Equipment;Needs Assistance   Current cognitive status: Alert/Oriented   Current Functional Status: Assistive Equipment;Needs Assistance   Facility Arrived From: (Home)   Lives With sibling(s)   Able to Return to Prior Arrangements yes   Is patient able to care for self after discharge? Yes   Who are your caregiver(s) and their phone number(s)? (Hayley Oquendo [phone] and Jazmyn Santo [phone])   Patient's perception of discharge disposition home health  (Already has [facility])   Readmission Within The Last 30 Days no previous admission in last 30 days   Patient currently being followed by outpatient case management? No   Patient currently receives any other outside agency services? No   Equipment Currently Used at Home wheelchair   Do you have any problems affording any of your prescribed medications? No   Is the patient taking medications as prescribed? yes   Does the patient have transportation home? Yes   Transportation Available family or friend will provide   Does the patient receive services at the [facility]? No   Discharge Plan A Home Health   Discharge Plan B Home with family;Home Health   Patient/Family In Agreement With Plan yes

## 2017-12-14 NOTE — PLAN OF CARE
Problem: Patient Care Overview  Goal: Plan of Care Review  Outcome: Ongoing (interventions implemented as appropriate)  Plan of care discussed with patient.  Verbalized understanding.  Pt is AAOx4.  Remains on bedrest but turns self easily.  Fluids continued.  Pt denies pain, N/V/discomfort.  Dressings changed to bilateral leg ulcers and ointment applied between toes.  Pt resting comfortably in bed.  Will continue to monitor.

## 2017-12-14 NOTE — ED NOTES
Atif updated on condition and pt to be brought to room per stretcher.  Bilateral legs unwrapped per nurse practitioner and she will order wound care to be done per floor.

## 2017-12-14 NOTE — ASSESSMENT & PLAN NOTE
Continue ceftriaxone, which is causing symptomatic improvement.  Switch to oral cephalosporin on discharge.

## 2017-12-14 NOTE — PT/OT/SLP EVAL
Occupational Therapy   Evaluation    Name: Sherin Ortiz  MRN: [id number]  Admitting Diagnosis:  Hypothermia      Recommendations:     Discharge Recommendations: nursing facility, skilled  Discharge Equipment Recommendations:  none  Barriers to discharge:  Decreased caregiver support    History:     Occupational Profile:  Living Environment: lives in [facility], no DES with sister(who is bedbound and pt cares for) Pt has electric WC, BSC. Pt has WIS and uses BSC in showe.  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    Benign essential hypertension 11/14/2012    Cataract     s/p phacoemulsification    Chronic kidney disease (CKD), stage III (moderate) 9/12/2013    Diabetic peripheral neuropathy associated with type 2 diabetes mellitus 11/14/2014    causing right hemiparesis    Gait disorder     Hyperlipidemia     Iritis - Both Eyes 6/10/2013    Kidney stone     Lymphedema     Morbid obesity with BMI of 40.0-44.9, adult 2/18/2015    NS (nuclear sclerosis) 4/1/2013    Nuclear sclerosis - Both Eyes 4/29/2013    Preseptal cellulitis - Right Eye 4/29/2013    Proliferative diabetic retinopathy - Both Eyes 4/29/2013    Proliferative diabetic retinopathy, both eyes 4/1/2013    PSC (posterior subcapsular cataract) - Both Eyes 4/29/2013    S/P hernia repair 12/19/2012    TIA (transient ischemic attack) 11/18/2014    Tinea pedis 7/24/2012    Tinea pedis is present on both feet.     Type 2 diabetes mellitus with renal manifestations, controlled 12/12/2013    Type 2 diabetes, controlled, with moderate nonproliferative diabetic retinopathy without macular edema 9/17/2015     Ulcer of left lower extremity, limited to breakdown of skin 7/8/2015    Unspecified cerebral artery occlusion with cerebral infarction 11/16/2014    Unspecified venous (peripheral) insufficiency     UTI (lower urinary tract infection)     Vaginal infection     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       Past Surgical History:   Procedure Laterality Date    APPENDECTOMY      CATARACT EXTRACTION W/  INTRAOCULAR LENS IMPLANT Left 5/21/2013    CATARACT EXTRACTION W/  INTRAOCULAR LENS IMPLANT Right 6/4/2013    CHOLECYSTECTOMY      COLONOSCOPY  12/22/2005    normal    ESOPHAGOGASTRODUODENOSCOPY  12/21/2015    hiatal hernia, Schatzki ring    EYE SURGERY Bilateral 2008    laser surgery both eyes    NASAL SEPTUM SURGERY      SUBTOTAL COLECTOMY  12/13/2012    transverse colon, for incarcerated umbilical hernia, Dr. Kat Bower       Subjective     Chief Complaint: Rt shoulder pain  Patient/Family stated goals: to return home, decrease shoulder pain  Communicated with: nurse prior to session.  Pain/Comfort:  · Pain Rating 1:  (pt did not rate)  · Location - Side 1: Right  · Location - Orientation 1: generalized  · Location 1: shoulder  · Pain Addressed 1: Reposition, Distraction, Nurse notified    Objective:     Patient found with: peripheral IV    General Precautions: Standard, fall   Orthopedic Precautions:    Braces:       Occupational Performance:    Bed Mobility:    · Patient completed Rolling/Turning to Left with  supervision  · Patient completed Scooting/Bridging with stand by assistance and minimum assistance  · Patient completed Supine to Sit with stand by assistance  · Patient completed Sit to Supine with moderate assistance and with leg lift    Functional Mobility/Transfers:  · Patient completed Sit <> Stand Transfer with minimum assistance  with  no assistive device and did come to full stance 2/2 c/o weakness     Activities of Daily Living:  ·     Cognitive/Visual Perceptual:  AO4, no  "memory deficits, flat affect  Decreased safety awareness    Physical Exam:  Good sitting balance, poor+/fair standing balance  LUE AROM WFL, strength grossly 3+/5  RUE AROM WFL except limited horiz adduction, shld flex to ~80; strength 2+/5 shld, 3+/5    Patient left left sidelying with all lines intact, call button in reach, bed alarm on and nurse notified    [facility] 6 Click:  [facility] Total Score: 15    Treatment & Education:  Pt educated on role of OT/POC, benefits of therapy at home. Attempted sit to stand x2; able to achieve 1/2 stand w/trunk forward flexed due to weakness.  Pt declined any side stepping. Able to lateral scoot with SBA-min A.  Education:    Assessment:     Sherin Ortiz is a 72 y.o. female with a medical diagnosis of Hypothermia.  She presents with performance deficits affecting function are weakness, gait instability, decreased upper extremity function, decreased lower extremity function, decreased ROM, impaired endurance, impaired balance, impaired self care skills, impaired functional mobilty, pain, impaired skin, decreased safety awareness.      Rehab Prognosis:  Fair+; patient would benefit from acute skilled OT services to address these deficits and reach maximum level of function.         Clinical Decision Makin.  OT Low:  "Pt evaluation falls under low complexity for evaluation coding due to performance deficits noted in 1-3 areas as stated above and 0 co-morbities affecting current functional status. Data obtained from problem focused assessments. No modifications or assistance was required for completion of evaluation. Only brief occupational profile and history review completed."     Plan:     Patient to be seen 5 x/week to address the above listed problems via self-care/home management, therapeutic activities, therapeutic exercises  · Plan of Care Expires: 17  · Plan of Care Reviewed with: patient    This Plan of care has been discussed with the patient who was involved " in its development and understands and is in agreement with the identified goals and treatment plan    GOALS:    Occupational Therapy Goals        Problem: Occupational Therapy Goal    Goal Priority Disciplines Outcome Interventions   Occupational Therapy Goal     OT, PT/OT Ongoing (interventions implemented as appropriate)    Description:  Goals to be met by: 1/14     Patient will increase functional independence with ADLs by performing:    UE Dressing with Modified [place].  LE Dressing with Contact Guard Assistance.  Grooming while seated with Modified [place].  Toileting from bedside commode with Stand-by Assistance for hygiene and clothing management.   Toilet transfer to bedside commode with Stand-by Assistance.  Upper extremity exercise program x10 reps per handout, with assistance as needed.                      Time Tracking:     OT Date of Treatment: 12/14/17  OT Start Time: 1428  OT Stop Time: 1455  OT Total Time (min): 27 min    Billable Minutes:Evaluation 15  Therapeutic Activity 8    Neo Covarrubias OT  12/14/2017

## 2017-12-14 NOTE — PLAN OF CARE
Problem: Patient Care Overview  Goal: Plan of Care Review  Outcome: Ongoing (interventions implemented as appropriate)  Pt is AAOx3. No complaints of nausea, vomiting, or diarrhea. No complaints of pain. Pt is incontinent of urine. Fluids initiated. Pt unable to ambulate for the night and has been on bedrest. Diabetic 2000 calorie diet. Bilateral shin pressure ulcers cleaned and dressed per order. Safety precautions maintained. Tolerated all medications well.

## 2017-12-14 NOTE — SUBJECTIVE & OBJECTIVE
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    Benign essential hypertension 11/14/2012    Cataract     s/p phacoemulsification    Chronic kidney disease (CKD), stage III (moderate) 9/12/2013    Diabetic peripheral neuropathy associated with type 2 diabetes mellitus 11/14/2014    causing right hemiparesis    Gait disorder     Hyperlipidemia     Iritis - Both Eyes 6/10/2013    Kidney stone     Lymphedema     Morbid obesity with BMI of 40.0-44.9, adult 2/18/2015    NS (nuclear sclerosis) 4/1/2013    Nuclear sclerosis - Both Eyes 4/29/2013    Preseptal cellulitis - Right Eye 4/29/2013    Proliferative diabetic retinopathy - Both Eyes 4/29/2013    Proliferative diabetic retinopathy, both eyes 4/1/2013    PSC (posterior subcapsular cataract) - Both Eyes 4/29/2013    S/P hernia repair 12/19/2012    TIA (transient ischemic attack) 11/18/2014    Tinea pedis 7/24/2012    Tinea pedis is present on both feet.     Type 2 diabetes mellitus with renal manifestations, controlled 12/12/2013    Type 2 diabetes, controlled, with moderate nonproliferative diabetic retinopathy without macular edema 9/17/2015    Ulcer of left lower extremity, limited to breakdown of skin 7/8/2015    Unspecified cerebral artery occlusion with cerebral infarction 11/16/2014    Unspecified venous (peripheral) insufficiency     UTI (lower urinary tract infection)     Vaginal infection     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       Past Surgical History:   Procedure Laterality Date    APPENDECTOMY      CATARACT EXTRACTION W/  INTRAOCULAR LENS IMPLANT Left 5/21/2013    CATARACT EXTRACTION W/  INTRAOCULAR LENS IMPLANT Right 6/4/2013    CHOLECYSTECTOMY      COLONOSCOPY  12/22/2005    normal    ESOPHAGOGASTRODUODENOSCOPY  12/21/2015    hiatal hernia, Schatzki ring    EYE SURGERY Bilateral 2008    laser surgery both eyes    NASAL SEPTUM SURGERY      SUBTOTAL COLECTOMY   "12/13/2012    transverse colon, for incarcerated umbilical hernia, Dr. Kat Bower       Review of patient's allergies indicates:   Allergen Reactions    Penicillins Hives     Other reaction(s): Hives    Sulfa (sulfonamide antibiotics) Other (See Comments)     Shakes, pt states her doctor told her the shakes were possibly caused by an allergy to sulfa       No current facility-administered medications on file prior to encounter.      Current Outpatient Prescriptions on File Prior to Encounter   Medication Sig    ACCU-CHEK RAMIRO PLUS METER Misc     ACCU-CHEK RAMIRO PLUS TEST STRP Strp TEST TWICE DAILY    ACCU-CHEK SOFTCLIX LANCETS Misc     aspirin 81 MG Chew Take 81 mg by mouth once daily.      atorvastatin (LIPITOR) 40 MG tablet TAKE 1 TABLET EVERY EVENING    BD INSULIN SYRINGE ULTRA-FINE 1/2 mL 30 gauge x 1/2" Syrg USE EVERY NIGHT    clopidogrel (PLAVIX) 75 mg tablet TAKE 1 TABLET ONE TIME DAILY    diclofenac sodium 1 % Gel Apply 2 g topically nightly as needed.    econazole nitrate 1 % cream Apply 1 application topically once daily. Apply to affected area    ergocalciferol (ERGOCALCIFEROL) 50,000 unit Cap Take 1 capsule (50,000 Units total) by mouth every 7 days. (Patient taking differently: Take 50,000 Units by mouth every Monday. )    glimepiride (AMARYL) 1 MG tablet Take 2 tablets (2 mg total) by mouth with lunch.    hydrochlorothiazide (HYDRODIURIL) 25 MG tablet Take 1 tablet (25 mg total) by mouth once daily.    LANTUS 100 unit/mL injection INJECT 7 TO 10 UNITS SUBCUTANEOUSLY IN THE EVENING DEPENDING ON SCALE (DISCARD EACH VIAL AFTER 28 DAYS) (Patient taking differently: 35 units)    lisinopril 10 MG tablet TAKE 1 TABLET ONE TIME DAILY    triamcinolone acetonide 0.1% (KENALOG) 0.1 % cream APPLY TOPICALLY TO BOTH LEGS Q MONDAY AND FRIDAY     Family History     Problem Relation (Age of Onset)    Cataracts Sister, Brother    Diabetes Sister    Heart disease Brother    Leukemia Mother    "     Social History Main Topics    Smoking status: Former Smoker     Packs/day: 0.50     Years: 15.00     Types: Cigarettes     Quit date: 7/9/1982    Smokeless tobacco: Former User      Comment: smoked one pack per week    Alcohol use No    Drug use: No    Sexual activity: Not Currently     Review of Systems   Constitutional: Positive for chills and fatigue. Negative for diaphoresis and fever.   HENT: Negative for congestion, trouble swallowing and voice change.    Eyes: Negative for photophobia, pain and visual disturbance.   Respiratory: Negative for cough, shortness of breath and wheezing.    Cardiovascular: Negative for chest pain, palpitations and leg swelling.   Gastrointestinal: Negative for abdominal pain, nausea and vomiting.   Endocrine: Negative for cold intolerance and heat intolerance.   Genitourinary: Negative for dysuria, frequency and urgency.   Musculoskeletal: Negative for arthralgias, gait problem and myalgias.   Skin: Positive for wound. Negative for color change, pallor and rash.        Chronic wounds to bilateral lower extremities   Neurological: Positive for dizziness and tremors. Negative for weakness and headaches.   Hematological: Does not bruise/bleed easily.   Psychiatric/Behavioral: Negative for agitation, confusion and hallucinations.     Objective:     Vital Signs (Most Recent):  Temp: 97.4 °F (36.3 °C) (12/13/17 2317)  Pulse: 76 (12/13/17 2317)  Resp: 18 (12/13/17 2317)  BP: 124/60 (12/13/17 2317)  SpO2: 98 % (12/13/17 2317) Vital Signs (24h Range):  Temp:  [94.5 °F (34.7 °C)-97.4 °F (36.3 °C)] 97.4 °F (36.3 °C)  Pulse:  [75-78] 76  Resp:  [18-20] 18  SpO2:  [98 %-99 %] 98 %  BP: (124-168)/(60-75) 124/60     Weight: (!) 147.9 kg (326 lb)  Body mass index is 51.06 kg/m².    Physical Exam   Constitutional: She is oriented to person, place, and time. She appears well-developed and well-nourished. No distress.   Obese   HENT:   Head: Normocephalic and atraumatic.   Mouth/Throat:  Oropharynx is clear and moist. Abnormal dentition.   Eyes: EOM are normal. Pupils are equal, round, and reactive to light. No scleral icterus.   Neck: Normal range of motion. Neck supple. No tracheal deviation present.   Cardiovascular: Normal rate, regular rhythm and intact distal pulses.  Exam reveals distant heart sounds.    Pulmonary/Chest: Effort normal and breath sounds normal. No respiratory distress. She has no wheezes.   Abdominal: Soft. Bowel sounds are normal. She exhibits no distension. There is no tenderness.   Musculoskeletal: Normal range of motion. She exhibits edema. She exhibits no tenderness.   2+ edema to bilateral lower extremities and feet.   Neurological: She is alert and oriented to person, place, and time. She has normal strength. GCS eye subscore is 4. GCS verbal subscore is 5. GCS motor subscore is 6.   Skin: Skin is warm. Lesion noted. There is erythema. No cyanosis.   Venous stasis ulcers to bilateral lower extremities.  Wounds to bilateral toes with surrounding erythema.  (view attached photos)   Psychiatric: She has a normal mood and affect. Her speech is normal and behavior is normal.   Nursing note and vitals reviewed.        CRANIAL NERVES     CN III, IV, VI   Pupils are equal, round, and reactive to light.  Extraocular motions are normal.   Right lateral leg        Left medial leg          Toes               Significant Labs:   CBC:   Recent Labs  Lab 12/13/17 1700   WBC 3.86*   HGB 11.0*   HCT 35.0*        CMP:   Recent Labs  Lab 12/13/17 1700      K 5.3*      CO2 21*   *   BUN 26*   CREATININE 1.5*   CALCIUM 9.8   PROT 7.5   ALBUMIN 2.8*   BILITOT 0.4   ALKPHOS 137*   AST 26   ALT 28   ANIONGAP 10   EGFRNONAA 34*     Cardiac Markers:   Recent Labs  Lab 12/13/17 1700   *     Coagulation:   Recent Labs  Lab 12/13/17 1700   INR 1.0   APTT 27.6     Lactic Acid:   Recent Labs  Lab 12/13/17 1700 12/13/17 2032   LACTATE 1.3 0.7     Lipase:   Recent  Labs  Lab 12/13/17  1700   LIPASE 34     POCT Glucose:   Recent Labs  Lab 12/13/17  1545 12/13/17  2357   POCTGLUCOSE 168* 142*     Troponin:   Recent Labs  Lab 12/13/17  1700   TROPONINI 0.006     TSH:   Recent Labs  Lab 12/13/17  1700   TSH 1.149     Urine Studies:   Recent Labs  Lab 12/13/17  1701   COLORU Yellow   APPEARANCEUA Cloudy*   PHUR 6.0   SPECGRAV 1.025   PROTEINUA Trace*   GLUCUA 3+*   KETONESU Negative   BILIRUBINUA Negative   OCCULTUA 1+*   NITRITE Positive*   UROBILINOGEN Negative   LEUKOCYTESUR Trace*   RBCUA 10*   WBCUA >100*   BACTERIA Moderate*   SQUAMEPITHEL 5     All pertinent labs within the past 24 hours have been reviewed.    Significant Imaging: I have reviewed all pertinent imaging results/findings within the past 24 hours.     X-Ray Chest AP Portable:  Findings suggesting congestive change, possible effusions, body habitus limits exam.    CT Renal Stone Study Abd Pelvis WO:   No evidence of nephrolithiasis or evidence of obstructive uropathy.  Resolution of previous severe right-sided hydroureteronephrosis.    Minimally hazy changes surrounding the pancreatic head.  Suggest correlation with amylase and lipase levels.    Status post repair of ventral abdominal wall hernia.  No evidence of recurrent hernia.    Diverticulosis coli without evidence of acute diverticulitis.    Stable cystic lesion in the right adnexa.    Additional findings as above.

## 2017-12-14 NOTE — H&P
[facility]  History & Physical    Patient Name: Sherin Ortiz  MRN: [id number]  Admission Date: 12/13/2017  Attending Physician: Oscar Bloom MD   Primary Care Provider: Ame Vasquez MD         Patient information was obtained from patient, relative(s), past medical records and ER records.     Subjective:     Principal Problem:Hypothermia    Chief Complaint:   Chief Complaint   Patient presents with    Fatigue     c/o fatigue, weakness, and decreased appetite x2 days. Daughter states when pt has these symptoms she normally has a kidney infection        HPI: Sherin Ortiz is a 72 y.o.  woman with morbid obesity, hypertension, hyperlipidemia, diabetes mellitus type 2 (on insulin therapy) with retinopathy, neuropathy, and chronic kidney disease stage 3, nephrolithiasis, chronic venous hypertension with leg edema, bilateral tinea pedis, frequent UTIs, and chronic pyuria and bacteriuria with positive nitrite, even when she is not ill.  She has been dependent on a motorized wheelchair since her 50s.  She lives in [place] and receives her outpatient medical care at [facility].  Her primary care physician is Dr. Ame Vasquez.  Her nephrologist is Dr. Cynthia Choi.  She is followed by NP Wiliam Mccoy in Endocrinology clinic.  She is followed by NP Akosua Alvarenga in wound care clinic for bilateral leg wounds.  She has a penicillin allergy but has tolerated cephalosporins.  She typically gets hypothermia and leukopenia when she has urinary tract infections (she was hospitalized at [facility] from 11/6/14-11/9/14 for evaluation of this, with no other etiology found other than a UTI).              She presented to [facility] ED on 12/13/17 with fatigue, weakness, dizziness, chills, and tremors beginning on Monday 12/11/17.   She denies fever, dysuria, flank pain, and urinary frequency.  [facility] had [facility] so her daughter believed she had another UTI.  She continued to have hypothermia in the ED.  WBC count was mildly low (3,860).  Urinalysis unsurprisingly showed positive nitrite, pyuria, and bacteriuria.  Ceftriaxone and a liter of IV saline were given.  She was admitted to [facility].    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    Benign essential hypertension 11/14/2012    Cataract     s/p phacoemulsification    Chronic kidney disease (CKD), stage III (moderate) 9/12/2013    Diabetic peripheral neuropathy associated with type 2 diabetes mellitus 11/14/2014    causing right hemiparesis    Gait disorder     Hyperlipidemia     Iritis - Both Eyes 6/10/2013    Kidney stone     Lymphedema     Morbid obesity with BMI of 40.0-44.9, adult 2/18/2015    NS (nuclear sclerosis) 4/1/2013    Nuclear sclerosis - Both Eyes 4/29/2013    Preseptal cellulitis - Right Eye 4/29/2013    Proliferative diabetic retinopathy - Both Eyes 4/29/2013    Proliferative diabetic retinopathy, both eyes 4/1/2013    PSC (posterior subcapsular cataract) - Both Eyes 4/29/2013    S/P hernia repair 12/19/2012    TIA (transient ischemic attack) 11/18/2014    Tinea pedis 7/24/2012    Tinea pedis is present on both feet.     Type 2 diabetes mellitus with renal manifestations, controlled 12/12/2013    Type 2 diabetes, controlled, with moderate nonproliferative diabetic retinopathy without macular edema 9/17/2015    Ulcer of left lower extremity, limited to breakdown of skin 7/8/2015    Unspecified cerebral artery occlusion with cerebral infarction 11/16/2014    Unspecified venous (peripheral) insufficiency     UTI (lower urinary tract infection)     Vaginal infection     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       Past Surgical History:   Procedure Laterality Date    APPENDECTOMY      CATARACT EXTRACTION W/  INTRAOCULAR LENS IMPLANT Left  "5/21/2013    CATARACT EXTRACTION W/  INTRAOCULAR LENS IMPLANT Right 6/4/2013    CHOLECYSTECTOMY      COLONOSCOPY  12/22/2005    normal    ESOPHAGOGASTRODUODENOSCOPY  12/21/2015    hiatal hernia, Schatzki ring    EYE SURGERY Bilateral 2008    laser surgery both eyes    NASAL SEPTUM SURGERY      SUBTOTAL COLECTOMY  12/13/2012    transverse colon, for incarcerated umbilical hernia, Dr. Kat Bower       Review of patient's allergies indicates:   Allergen Reactions    Penicillins Hives     Other reaction(s): Hives    Sulfa (sulfonamide antibiotics) Other (See Comments)     Shakes, pt states her doctor told her the shakes were possibly caused by an allergy to sulfa       No current facility-administered medications on file prior to encounter.      Current Outpatient Prescriptions on File Prior to Encounter   Medication Sig    ACCU-CHEK RAMIRO PLUS METER Misc     ACCU-CHEK RAMIRO PLUS TEST STRP Strp TEST TWICE DAILY    ACCU-CHEK SOFTCLIX LANCETS Misc     aspirin 81 MG Chew Take 81 mg by mouth once daily.      atorvastatin (LIPITOR) 40 MG tablet TAKE 1 TABLET EVERY EVENING    BD INSULIN SYRINGE ULTRA-FINE 1/2 mL 30 gauge x 1/2" Syrg USE EVERY NIGHT    clopidogrel (PLAVIX) 75 mg tablet TAKE 1 TABLET ONE TIME DAILY    diclofenac sodium 1 % Gel Apply 2 g topically nightly as needed.    econazole nitrate 1 % cream Apply 1 application topically once daily. Apply to affected area    ergocalciferol (ERGOCALCIFEROL) 50,000 unit Cap Take 1 capsule (50,000 Units total) by mouth every 7 days. (Patient taking differently: Take 50,000 Units by mouth every Monday. )    glimepiride (AMARYL) 1 MG tablet Take 2 tablets (2 mg total) by mouth with lunch.    hydrochlorothiazide (HYDRODIURIL) 25 MG tablet Take 1 tablet (25 mg total) by mouth once daily.    LANTUS 100 unit/mL injection INJECT 7 TO 10 UNITS SUBCUTANEOUSLY IN THE EVENING DEPENDING ON SCALE (DISCARD EACH VIAL AFTER 28 DAYS) (Patient taking differently: " 35 units)    lisinopril 10 MG tablet TAKE 1 TABLET ONE TIME DAILY    triamcinolone acetonide 0.1% (KENALOG) 0.1 % cream APPLY TOPICALLY TO BOTH LEGS Q MONDAY AND FRIDAY     Family History     Problem Relation (Age of Onset)    Cataracts Sister, Brother    Diabetes Sister    Heart disease Brother    Leukemia Mother        Social History Main Topics    Smoking status: Former Smoker     Packs/day: 0.50     Years: 15.00     Types: Cigarettes     Quit date: 7/9/1982    Smokeless tobacco: Former User      Comment: smoked one pack per week    Alcohol use No    Drug use: No    Sexual activity: Not Currently     Review of Systems   Constitutional: Positive for chills and fatigue. Negative for diaphoresis and fever.   HENT: Negative for congestion, trouble swallowing and voice change.    Eyes: Negative for photophobia, pain and visual disturbance.   Respiratory: Negative for cough, shortness of breath and wheezing.    Cardiovascular: Negative for chest pain, palpitations and leg swelling.   Gastrointestinal: Negative for abdominal pain, nausea and vomiting.   Endocrine: Negative for cold intolerance and heat intolerance.   Genitourinary: Negative for dysuria, frequency and urgency.   Musculoskeletal: Negative for arthralgias, gait problem and myalgias.   Skin: Positive for wound. Negative for color change, pallor and rash.        Chronic wounds to bilateral lower extremities   Neurological: Positive for dizziness and tremors. Negative for weakness and headaches.   Hematological: Does not bruise/bleed easily.   Psychiatric/Behavioral: Negative for agitation, confusion and hallucinations.     Objective:     Vital Signs (Most Recent):  Temp: 97.4 °F (36.3 °C) (12/13/17 2317)  Pulse: 76 (12/13/17 2317)  Resp: 18 (12/13/17 2317)  BP: 124/60 (12/13/17 2317)  SpO2: 98 % (12/13/17 2317) Vital Signs (24h Range):  Temp:  [94.5 °F (34.7 °C)-97.4 °F (36.3 °C)] 97.4 °F (36.3 °C)  Pulse:  [75-78] 76  Resp:  [18-20] 18  SpO2:  [98  %-99 %] 98 %  BP: (124-168)/(60-75) 124/60     Weight: (!) 147.9 kg (326 lb)  Body mass index is 51.06 kg/m².    Physical Exam   Constitutional: She is oriented to person, place, and time. She appears well-developed and well-nourished. No distress.   Obese   HENT:   Head: Normocephalic and atraumatic.   Mouth/Throat: Oropharynx is clear and moist. Abnormal dentition.   Eyes: EOM are normal. Pupils are equal, round, and reactive to light. No scleral icterus.   Neck: Normal range of motion. Neck supple. No tracheal deviation present.   Cardiovascular: Normal rate, regular rhythm and intact distal pulses.  Exam reveals distant heart sounds.    Pulmonary/Chest: Effort normal and breath sounds normal. No respiratory distress. She has no wheezes.   Abdominal: Soft. Bowel sounds are normal. She exhibits no distension. There is no tenderness.   Musculoskeletal: Normal range of motion. She exhibits edema. She exhibits no tenderness.   2+ edema to bilateral lower extremities and feet.   Neurological: She is alert and oriented to person, place, and time. She has normal strength. GCS eye subscore is 4. GCS verbal subscore is 5. GCS motor subscore is 6.   Skin: Skin is warm. Lesion noted. There is erythema. No cyanosis.   Venous stasis ulcers to bilateral lower extremities.  Wounds to bilateral toes with surrounding erythema.  (view attached photos)   Psychiatric: She has a normal mood and affect. Her speech is normal and behavior is normal.   Nursing note and vitals reviewed.        CRANIAL NERVES     CN III, IV, VI   Pupils are equal, round, and reactive to light.  Extraocular motions are normal.   Right lateral leg        Left medial leg          Toes               Significant Labs:   CBC:   Recent Labs  Lab 12/13/17  1700   WBC 3.86*   HGB 11.0*   HCT 35.0*        CMP:   Recent Labs  Lab 12/13/17  1700      K 5.3*      CO2 21*   *   BUN 26*   CREATININE 1.5*   CALCIUM 9.8   PROT 7.5   ALBUMIN 2.8*    BILITOT 0.4   ALKPHOS 137*   AST 26   ALT 28   ANIONGAP 10   EGFRNONAA 34*     Cardiac Markers:   Recent Labs  Lab 12/13/17  1700   *     Coagulation:   Recent Labs  Lab 12/13/17  1700   INR 1.0   APTT 27.6     Lactic Acid:   Recent Labs  Lab 12/13/17  1700 12/13/17  2032   LACTATE 1.3 0.7     Lipase:   Recent Labs  Lab 12/13/17  1700   LIPASE 34     POCT Glucose:   Recent Labs  Lab 12/13/17  1545 12/13/17  2357   POCTGLUCOSE 168* 142*     Troponin:   Recent Labs  Lab 12/13/17  1700   TROPONINI 0.006     TSH:   Recent Labs  Lab 12/13/17  1700   TSH 1.149     Urine Studies:   Recent Labs  Lab 12/13/17  1701   COLORU Yellow   APPEARANCEUA Cloudy*   PHUR 6.0   SPECGRAV 1.025   PROTEINUA Trace*   GLUCUA 3+*   KETONESU Negative   BILIRUBINUA Negative   OCCULTUA 1+*   NITRITE Positive*   UROBILINOGEN Negative   LEUKOCYTESUR Trace*   RBCUA 10*   WBCUA >100*   BACTERIA Moderate*   SQUAMEPITHEL 5     All pertinent labs within the past 24 hours have been reviewed.    Significant Imaging: I have reviewed all pertinent imaging results/findings within the past 24 hours.     X-Ray Chest AP Portable:  Findings suggesting congestive change, possible effusions, body habitus limits exam.    CT Renal Stone Study Abd Pelvis WO:   No evidence of nephrolithiasis or evidence of obstructive uropathy.  Resolution of previous severe right-sided hydroureteronephrosis.    Minimally hazy changes surrounding the pancreatic head.  Suggest correlation with amylase and lipase levels.    Status post repair of ventral abdominal wall hernia.  No evidence of recurrent hernia.    Diverticulosis coli without evidence of acute diverticulitis.    Stable cystic lesion in the right adnexa.    Additional findings as above.    Assessment/Plan:     * Hypothermia    Patient's daughter reports that patient has chronic hypothermia.  Treat UTI.  Apply blankets or Behr hugger as needed.  Continue to monitor.          Bacteriuria with pyuria    Urine culture  pending.  Continue ceftriaxone.          Uncontrolled type 2 diabetes mellitus with stage 3 chronic kidney disease, with long-term current use of insulin    Takes lantus 35 units qHS and glimeperide 2 mg with lunch.  Give levemir 20 units qHS.  ADA diet.  Acuchecks with low dose SSI.  Pattern glucose and adjust insulin dose as necessary.          Chronic venous hypertension with ulcer involving both sides    Venous insufficiency of leg  Chronic.  Is followed outpatient in wound care clinic and receives wound twice weekly with home health.  Consult wound care; appreciate assistance.          Hyperkalemia    Holding lisinopril.  Continue to monitor.          Diabetic peripheral neuropathy associated with type 2 diabetes mellitus    Currently uses diclofenac gel for neuropathy at home.          Stage 3 chronic kidney disease due to type 2 diabetes mellitus    Chronic.  Stable.  Avoid nephrotoxins.  Continue to monitor.          Benign essential hypertension    Chronic. Stable.  Continue home dose HCTZ.  Holding home dose lisinopril due to hyperkalemia; resume when appropriate.  Continue to monitor.          Hyperlipidemia LDL goal <100    Chronic.  Continue atorvastatin.          Tinea pedis    Chronic.  Continue antifungal cream to toes.            VTE Risk Mitigation         Ordered     enoxaparin injection 40 mg  Daily     Route:  Subcutaneous        12/13/17 2208     Medium Risk of VTE  Once      12/13/17 2208             Ale Mccracken NP  Department of Hospital Medicine   [facility]

## 2017-12-14 NOTE — PT/OT/SLP EVAL
"Physical Therapy Evaluation    Patient Name:  Sherin Ortiz   MRN:  [id number]    Recommendations:     Discharge Recommendations:  nursing facility, skilled   Discharge Equipment Recommendations:     Barriers to discharge: Decreased caregiver support    Assessment:     Sherin Ortiz is a 72 y.o. female admitted with a medical diagnosis of Hypothermia.  She presents with the following impairments/functional limitations:  weakness, impaired endurance, impaired sensation, gait instability, impaired self care skills, impaired balance, impaired functional mobilty, impaired cognition, decreased lower extremity function, decreased upper extremity function, pain, decreased safety awareness, decreased ROM, impaired skin, edema, impaired joint extensibility Patients observed ability at time of eval is much lower than patients reported level of function prior to admission, patient stating she just "doesn't feel up to it today". Patient was also assisting her bedbound sister at home prior to admit and an this time would be unable to do that.    Rehab Prognosis:  Good; patient would benefit from acute skilled PT services to address these deficits and reach maximum level of function.      Recent Surgery: * No surgery found *      Plan:     During this hospitalization, patient to be seen 5 x/week to address the above listed problems via therapeutic activities, therapeutic exercises  · Plan of Care Expires:  01/14/18   Plan of Care Reviewed with: patient    Subjective     Communicated with OT prior to session.  Patient found supine with RUE propped from behind with hot pack,  upon PT entry to room, agreeable to evaluation.      Chief Complaint: shoulder pain  Patient comments/goals: patient reported she didn't do anything to injure her arm, yet also stated 2 weeks or so ago, she was lifting a pot and the pain ensued starting just proximal to the R elbow and then slowly ascending to her shoulder. Reports her daughter provides " transportation for her and goes to wound care every other week at [facility] and has  nursing 2x week.  Patient also reports low back pain from laying down.   Pain/Comfort:  · Pain Rating 1:  (not rated, though asked)  · Location - Side 1: Right  · Location - Orientation 1: generalized  · Location 1: shoulder (pt did state pain is exacerbated upon trying to elevate her arm)  · Pain Addressed 1: Reposition, Cessation of Activity  · Pain Rating Post-Intervention 1:  (again not rated)    Patients cultural, spiritual, Confucianism conflicts given the current situation: none verbalized    Living Environment:  With bedbound sister in a single story home, walk in shower. Uses power chair and bedside commode. Reports she gets into her walk in shower, however patient has bandaged LE's.  Son and granddaughter live across the street. Wears post op shoes on both feet at home. Reports that she completes stand pivot transfers to her chair at home and spends most of the day in the chair, only sleeping in her bed.  Prior to admission, patients level of function was mod Independent per patient report.  Patient has the following equipment: power chair, bedside commode.   Upon discharge, patient will have assistance from son and neighbor down the street, though this has not been verified, and this is assuming that patient has the potential to return to baseline while here.    Objective:     Patient found with: peripheral IV (gauze dressings to BLE)     General Precautions: Standard, fall   Orthopedic Precautions:N/A   Braces: N/A     Exams:  · Gross Motor Coordination:  WFL  · Postural Exam:  Patient presented with the following abnormalities:    · -       Rounded shoulders  · -       Forward head  · -       Posterior pelvic tilt  · -       Abnormal trunk flexion - upon standing EOB, pt unable to stand erect, flexed forward holding on the therapist at each side  · Sensation: reported impairment Left UE  · Skin Integrity/Edema:   Gauze dressings B LE with erythema noted slightly proximal to where dressings end, L UE with edema shoulder to wrist,     Functional Mobility:  · Bed Mobility:     · Supine to Sit: stand by assistance  · Sit to Supine: moderate assistance  · Transfers:     · Sit to Stand:  maximal assistance and of 2 persons with no AD    AM-PAC 6 CLICK MOBILITY  Total Score:12       Therapeutic Activities and Exercises:   Patient returned to L sidelying with R shoulder propped on pillow, required assist for leg lift. Upon going to sidelying, pt was able to pull herself up with slow amplitude movements and with exertion.  Unable to laterally scoot in sitting prior to transferring to sidelying    Patient left left sidelying with all lines intact, call button in reach and bed alarm on.    GOALS:    Physical Therapy Goals        Problem: Physical Therapy Goal    Goal Priority Disciplines Outcome Goal Variances Interventions   Physical Therapy Goal     PT/OT, PT Ongoing (interventions implemented as appropriate)     Description:  Goals to be met by: 12/30/17     Patient will increase functional independence with mobility by performing:    Supine <> sit mod I without use of bedrails  SIt <>stand CGA at EOB  SPT bed to wheelchair or BSC (bariatric size needed) with min A  Perform LE TE and stretches x 10 reps bilaterally                      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    Benign essential hypertension 11/14/2012    Cataract     s/p phacoemulsification    Chronic kidney disease (CKD), stage III (moderate) 9/12/2013    Diabetic peripheral neuropathy associated with type 2 diabetes mellitus 11/14/2014    causing right hemiparesis    Gait disorder     Hyperlipidemia     Iritis - Both Eyes 6/10/2013    Kidney stone     Lymphedema     Morbid obesity with BMI of 40.0-44.9, adult 2/18/2015    NS (nuclear sclerosis) 4/1/2013    Nuclear sclerosis - Both Eyes  4/29/2013    Preseptal cellulitis - Right Eye 4/29/2013    Proliferative diabetic retinopathy - Both Eyes 4/29/2013    Proliferative diabetic retinopathy, both eyes 4/1/2013    PSC (posterior subcapsular cataract) - Both Eyes 4/29/2013    S/P hernia repair 12/19/2012    TIA (transient ischemic attack) 11/18/2014    Tinea pedis 7/24/2012    Tinea pedis is present on both feet.     Type 2 diabetes mellitus with renal manifestations, controlled 12/12/2013    Type 2 diabetes, controlled, with moderate nonproliferative diabetic retinopathy without macular edema 9/17/2015    Ulcer of left lower extremity, limited to breakdown of skin 7/8/2015    Unspecified cerebral artery occlusion with cerebral infarction 11/16/2014    Unspecified venous (peripheral) insufficiency     UTI (lower urinary tract infection)     Vaginal infection     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       Past Surgical History:   Procedure Laterality Date    APPENDECTOMY      CATARACT EXTRACTION W/  INTRAOCULAR LENS IMPLANT Left 5/21/2013    CATARACT EXTRACTION W/  INTRAOCULAR LENS IMPLANT Right 6/4/2013    CHOLECYSTECTOMY      COLONOSCOPY  12/22/2005    normal    ESOPHAGOGASTRODUODENOSCOPY  12/21/2015    hiatal hernia, Schatzki ring    EYE SURGERY Bilateral 2008    laser surgery both eyes    NASAL SEPTUM SURGERY      SUBTOTAL COLECTOMY  12/13/2012    transverse colon, for incarcerated umbilical hernia, Dr. Kat Bower       Clinical Decision Making:     History  Co-morbidities and personal factors that may impact the plan of care Examination  Body Structures and Functions, activity limitations and participation restrictions that may impact the plan of care Clinical Presentation   Decision Making/ Complexity Score   Co-morbidities:   [x] Time since onset of injury / illness / exacerbation  [x] Status of current condition  [x]Patient's cognitive status and safety concerns    [] Multiple Medical Problems (see med  hx)  Personal Factors:   [] Patient's age  [] Prior Level of function   [x] Patient's home situation (environment and family support)  [x] Patient's level of motivation  [] Expected progression of patient      HISTORY:(criteria)    [] 35637 - no personal factors/history    [] 14082 - has 1-2 personal factor/comorbidity     [x] 54795 - has >3 personal factor/comorbidity     Body Regions:  [] Objective examination findings  [] Head     []  Neck  [] Trunk   [x] Upper Extremity  [x] Lower Extremity    Body Systems:  [] For communication ability, affect, cognition, language, and learning style: the assessment of the ability to make needs known, consciousness, orientation (person, place, and time), expected emotional /behavioral responses, and learning preferences (eg, learning barriers, education  needs)  [x] For the neuromuscular system: a general assessment of gross coordinated movement (eg, balance, gait, locomotion, transfers, and transitions) and motor function  (motor control and motor learning)  [x] For the musculoskeletal system: the assessment of gross symmetry, gross range of motion, gross strength, height, and weight  [x] For the integumentary system: the assessment of pliability(texture), presence of scar formation, skin color, and skin integrity  [] For cardiovascular/pulmonary system: the assessment of heart rate, respiratory rate, blood pressure, and edema     Activity limitations:    [x] Patient's cognitive status and saf ety concerns          [x] Status of current condition      [] Weight bearing restriction  [] Cardiopulmunary Restriction    Participation Restrictions:   [x] Goals and goal agreement with the patient     [] Rehab potential (prognosis) and probable outcome      Examination of Body System: (criteria)    [] 29328 - addressing 1-2 elements    [] 13564 - addressing a total of 3 or more elements     [x] 31422 -  Addressing a total of 4 or more elements         Clinical Presentation: (criteria)   Stable - 54166     On examination of body system using standardized tests and measures patient presents with 4 or more elements from any of the following: body structures and functions, activity limitations, and/or participation restrictions.  Leading to a clinical presentation that is considered stable and/or uncomplicated                              Clinical Decision Making  (Eval Complexity):  Low- 66513     Time Tracking:     PT Received On: 12/14/17  PT Start Time: 1432     PT Stop Time: 1456  PT Total Time (min): 24 min     Billable Minutes: Evaluation 24      Sherin Rodriguez, PT  12/14/2017

## 2017-12-14 NOTE — ED NOTES
Awake and alert, oriented x 4. resp even and unlabored.  Lungs clear at this time. Denies chest pain, SOB, or abd pain.  abd large, soft and non tender.  Denies any urinary symptoms.  Pt noted with  Dressings to both legs from mid calf to toes, pt seeing wound care for sores to bilateral legs.  Swelling noted to toes with redness.  Pt and daughter states that this is how they normally look.  daughter at the bedside.  Pt on monitor.

## 2017-12-14 NOTE — SUBJECTIVE & OBJECTIVE
Interval History: Feels significantly better but has unassociated right shoulder pain due to strain.    Review of Systems   Respiratory: Negative for cough and shortness of breath.    Gastrointestinal: Negative for nausea and vomiting.     Objective:     Vital Signs (Most Recent):  Temp: 97.5 °F (36.4 °C) (12/14/17 0748)  Pulse: 84 (12/14/17 0748)  Resp: 16 (12/14/17 0748)  BP: 132/62 (12/14/17 0748)  SpO2: 97 % (12/14/17 0543) Vital Signs (24h Range):  Temp:  [94.5 °F (34.7 °C)-97.8 °F (36.6 °C)] 97.5 °F (36.4 °C)  Pulse:  [75-84] 84  Resp:  [16-20] 16  SpO2:  [97 %-99 %] 97 %  BP: (124-168)/(60-75) 132/62     Weight: (!) 147.9 kg (326 lb)  Body mass index is 51.06 kg/m².    Intake/Output Summary (Last 24 hours) at 12/14/17 1125  Last data filed at 12/14/17 1000   Gross per 24 hour   Intake             1785 ml   Output              600 ml   Net             1185 ml      Physical Exam   Constitutional: She is oriented to person, place, and time. She appears well-developed. No distress.   Pulmonary/Chest: Effort normal. No respiratory distress.   Neurological: She is alert and oriented to person, place, and time.   Psychiatric: She has a normal mood and affect.   Nursing note and vitals reviewed.      Significant Labs: All pertinent labs within the past 24 hours have been reviewed.    Significant Imaging: I have reviewed all pertinent imaging results/findings within the past 24 hours.   CT Renal Stone Study Abd Pelvis WO 12/13/17: The lung bases are unremarkable.  Coronary artery calcifications are present.  The heart is otherwise unremarkable.  There are calcifications of the abdominal aorta and its branch vessels.  There is no evidence of vascular occlusion.  There is no evidence of lymphadenopathy in the abdomen or pelvis.  There is a small hiatal hernia.  The distal stomach is unremarkable.  The duodenum is within normal limits.  The small bowel loops are unremarkable.  The appendix is is not conclusively  identified.  There are no secondary findings of acute appendicitis.  There are scattered colonic diverticula without evidence of acute diverticulitis.  No evidence of bowel obstruction is present.  The liver is unremarkable.  The gallbladder is decompressed.  The biliary tree is within normal limits.  There are calcifications in the spleen.  There is fatty atrophy of the pancreas.  Nonspecific hazy changes are noted in the pancreatic head.  No peripancreatic fluid collection is identified.  The adrenal glands are unremarkable.  There is no evidence of nephrolithiasis.  Previous right sided hydroureteronephrosis has resolved.  The urinary bladder and the ureters are unremarkable.  The uterus is within normal limits.  There is a 4.7 x 3.6 cm cystic lesion in the right adnexa.  This is stable compared to the prior examination.  No evidence of free fluid is identified in the abdomen or pelvis.  There is no evidence of free air.  There is no evidence of pneumatosis.  The psoas margins are unremarkable.  There are postoperative changes in the anterior abdominal wall.  No evidence of recurrent hernia is identified.  There are degenerative changes in the osseous structures.  No evidence of a fracture is present.  Impression:  No evidence of nephrolithiasis or evidence of obstructive uropathy.  Resolution of previous severe right-sided hydroureteronephrosis.  Minimally hazy changes surrounding the pancreatic head.  Suggest correlation with amylase and lipase levels.  Status post repair of ventral abdominal wall hernia.  No evidence of recurrent hernia.  Diverticulosis coli without evidence of acute diverticulitis.  Stable cystic lesion in the right adnexa.  Additional findings as above.

## 2017-12-14 NOTE — PLAN OF CARE
Problem: Occupational Therapy Goal  Goal: Occupational Therapy Goal  Goals to be met by: 1/14     Patient will increase functional independence with ADLs by performing:    UE Dressing with Modified Kerr.  LE Dressing with Contact Guard Assistance.  Grooming while seated with Modified Kerr.  Toileting from bedside commode with Stand-by Assistance for hygiene and clothing management.   Toilet transfer to bedside commode with Stand-by Assistance.  Upper extremity exercise program x10 reps per handout, with assistance as needed.    Outcome: Ongoing (interventions implemented as appropriate)  RUPERT cee performed report to follow    Rec SNF at this time

## 2017-12-14 NOTE — PROGRESS NOTES
[facility] Hospital Medicine  Progress Note    Patient Name: Sherin Ortiz  MRN: [id number]  Patient Class: OP- Observation   Admission Date: 12/13/2017  Length of Stay: 0 days  Attending Physician: Oscar Bloom MD  Primary Care Provider: Ame Vasquez MD        Subjective:     Principal Problem:Hypothermia    HPI:  Sherin Ortiz is a 72 y.o.  woman with morbid obesity, hypertension, hyperlipidemia, diabetes mellitus type 2 (on insulin therapy) with retinopathy, neuropathy, and chronic kidney disease stage 3, nephrolithiasis, chronic venous hypertension with leg edema, bilateral tinea pedis, frequent UTIs, and chronic pyuria and bacteriuria with positive nitrite, even when she is not ill.  She has been dependent on a motorized wheelchair since her 50s.  She lives in [place] and receives her outpatient medical care at [facility].  Her primary care physician is Dr. Ame Vasquez.  Her nephrologist is Dr. Cynthia Choi.  She is followed by NP Wiliam Mccoy in Endocrinology clinic.  She is followed by NP Akosua Alvarenga in wound care clinic for bilateral leg wounds.  She has a penicillin allergy but has tolerated cephalosporins.  She typically gets hypothermia and leukopenia when she has urinary tract infections (she was hospitalized at [facility] from 11/6/14-11/9/14 for evaluation of this, with no other etiology found other than a UTI).  She gets home health for wound care.              She presented to [facility] ED on 12/13/17 with fatigue, weakness, dizziness, chills, and tremors beginning on Monday 12/11/17.   She denies fever, dysuria, flank pain, and urinary frequency.  She had hypothermia (94 degrees Fahrenheit), so her daughter believed she had another UTI.  She continued to have hypothermia in the ED.  WBC count was mildly low (3,860).  Urinalysis unsurprisingly showed positive nitrite, pyuria, and bacteriuria.  Ceftriaxone  and a liter of IV saline were given.  She was admitted to [facility].    Hospital Course:  Ceftriaxone was continued.  Hypothermia resolved.  WBC count was still low the next morning.  She complained of right shoulder pain since lifting a pot recently.    Interval History: Feels significantly better but has unassociated right shoulder pain due to strain.    Review of Systems   Respiratory: Negative for cough and shortness of breath.    Gastrointestinal: Negative for nausea and vomiting.     Objective:     Vital Signs (Most Recent):  Temp: 97.5 °F (36.4 °C) (12/14/17 0748)  Pulse: 84 (12/14/17 0748)  Resp: 16 (12/14/17 0748)  BP: 132/62 (12/14/17 0748)  SpO2: 97 % (12/14/17 0543) Vital Signs (24h Range):  Temp:  [94.5 °F (34.7 °C)-97.8 °F (36.6 °C)] 97.5 °F (36.4 °C)  Pulse:  [75-84] 84  Resp:  [16-20] 16  SpO2:  [97 %-99 %] 97 %  BP: (124-168)/(60-75) 132/62     Weight: (!) 147.9 kg (326 lb)  Body mass index is 51.06 kg/m².    Intake/Output Summary (Last 24 hours) at 12/14/17 1125  Last data filed at 12/14/17 1000   Gross per 24 hour   Intake             1785 ml   Output              600 ml   Net             1185 ml      Physical Exam   Constitutional: She is oriented to person, place, and time. She appears well-developed. No distress.   Pulmonary/Chest: Effort normal. No respiratory distress.   Neurological: She is alert and oriented to person, place, and time.   Psychiatric: She has a normal mood and affect.   Nursing note and vitals reviewed.      Significant Labs: All pertinent labs within the past 24 hours have been reviewed.    Significant Imaging: I have reviewed all pertinent imaging results/findings within the past 24 hours.   CT Renal Stone Study Abd Pelvis WO 12/13/17: The lung bases are unremarkable.  Coronary artery calcifications are present.  The heart is otherwise unremarkable.  There are calcifications of the abdominal aorta and its branch vessels.  There is no evidence of vascular  occlusion.  There is no evidence of lymphadenopathy in the abdomen or pelvis.  There is a small hiatal hernia.  The distal stomach is unremarkable.  The duodenum is within normal limits.  The small bowel loops are unremarkable.  The appendix is is not conclusively identified.  There are no secondary findings of acute appendicitis.  There are scattered colonic diverticula without evidence of acute diverticulitis.  No evidence of bowel obstruction is present.  The liver is unremarkable.  The gallbladder is decompressed.  The biliary tree is within normal limits.  There are calcifications in the spleen.  There is fatty atrophy of the pancreas.  Nonspecific hazy changes are noted in the pancreatic head.  No peripancreatic fluid collection is identified.  The adrenal glands are unremarkable.  There is no evidence of nephrolithiasis.  Previous right sided hydroureteronephrosis has resolved.  The urinary bladder and the ureters are unremarkable.  The uterus is within normal limits.  There is a 4.7 x 3.6 cm cystic lesion in the right adnexa.  This is stable compared to the prior examination.  No evidence of free fluid is identified in the abdomen or pelvis.  There is no evidence of free air.  There is no evidence of pneumatosis.  The psoas margins are unremarkable.  There are postoperative changes in the anterior abdominal wall.  No evidence of recurrent hernia is identified.  There are degenerative changes in the osseous structures.  No evidence of a fracture is present.  Impression:  No evidence of nephrolithiasis or evidence of obstructive uropathy.  Resolution of previous severe right-sided hydroureteronephrosis.  Minimally hazy changes surrounding the pancreatic head.  Suggest correlation with amylase and lipase levels.  Status post repair of ventral abdominal wall hernia.  No evidence of recurrent hernia.  Diverticulosis coli without evidence of acute diverticulitis.  Stable cystic lesion in the right  adnexa.  Additional findings as above.    Assessment/Plan:      Right shoulder strain, initial encounter    PT/OT and cyclobenzaprine 5 mg PRN.          Recurrent UTI    Continue ceftriaxone, which is causing symptomatic improvement.  Switch to oral cephalosporin on discharge.          Bacteriuria with pyuria    Always present but currently symptomatic with hypothermia and weakness.          Uncontrolled type 2 diabetes mellitus with stage 3 chronic kidney disease, with long-term current use of insulin    Takes lantus 35 units qHS and glimeperide 2 mg with lunch.  Give levemir 20 units qHS.  ADA diet.  Acuchecks with low dose SSI.  Pattern glucose and adjust insulin dose as necessary.          Chronic venous hypertension with ulcer involving both sides    Venous insufficiency of leg  Chronic.  Is followed outpatient in wound care clinic and receives wound twice weekly with home health.  Consult wound care; appreciate assistance.          Diabetic peripheral neuropathy associated with type 2 diabetes mellitus    Currently uses diclofenac gel for neuropathy at home.          Stage 3 chronic kidney disease due to type 2 diabetes mellitus    Chronic.  Stable.  Avoid nephrotoxins.  Continue to monitor.          Benign essential hypertension    Chronic. Stable.  Continue home dose hydrochlorothiazide.  Holding home dose lisinopril due to hyperkalemia; resume when appropriate.  Continue to monitor.          Hyperlipidemia LDL goal <100    Chronic.  Continue atorvastatin.          Tinea pedis    Chronic.  Continue antifungal cream to toes.            VTE Risk Mitigation         Ordered     enoxaparin injection 40 mg  Daily     Route:  Subcutaneous        12/13/17 2208     Medium Risk of VTE  Once      12/13/17 2208        Disposition: Still weak.  Home tomorrow.  Resume home health on discharge.    Oscar Bloom MD  Department of Hospital Medicine   [facility]

## 2017-12-14 NOTE — ASSESSMENT & PLAN NOTE
Chronic. Stable.  Continue home dose hydrochlorothiazide.  Holding home dose lisinopril due to hyperkalemia; resume when appropriate.  Continue to monitor.

## 2017-12-14 NOTE — PROGRESS NOTES
[facility]  Chart Review Note    Sherin Ortiz is a 72 y.o.  woman with morbid obesity, hypertension, hyperlipidemia, diabetes mellitus type 2 (on insulin therapy) with retinopathy, neuropathy, and chronic kidney disease stage 3, nephrolithiasis, chronic venous hypertension with leg edema, bilateral tinea pedis, frequent UTIs, and chronic pyuria and bacteriuria with positive nitrite, even when she is not ill.  She has been dependent on a motorized wheelchair since her 50s.  She lives in [place] and receives her outpatient medical care at [facility].  Her primary care physician is Dr. Ame Vasquez.  Her nephrologist is Dr. Cynthia Choi.  She is followed by HERMES Mccoy in Endocrinology clinic.  She is followed by NP Akosua Alvarenga in wound care clinic for bilateral leg wounds.  She has a penicillin allergy but has tolerated cephalosporins.  She typically gets hypothermia and leukopenia when she has urinary tract infections (she was hospitalized at [facility] from 11/6/14-11/9/14 for evaluation of this, with no other etiology found other than a UTI).   She presented to [facility] ED on 12/13/17 with fatigue, weakness, dizziness, and tremors.  She had hypothermia (94 degrees Fahrenheit), so her daughter believed she had another UTI.  She continued to have hypothermia in the ED.  WBC count was mildly low (3,860).  Urinalysis unsurprisingly showed positive nitrite, pyuria, and bacteriuria.  Ceftriaxone and a liter of IV saline were given.  She was admitted to [facility].    Plan:  History of kidney stones so CT to evaluate.  Continue ceftriaxone.  Give IV fluids.  Monitor temperature and WBC count.  Follow up urine culture.

## 2017-12-14 NOTE — HOSPITAL COURSE
Ceftriaxone was continued.  Hypothermia resolved and she reported feeling better.  WBC count was still mildly low.  She complained of right shoulder pain since lifting a pot recently, so Physical and Occupational Therapy were consulted.  They recommended skilled nursing facility, but Ms. Ortiz refused and requested home health.  Urine culture grew E coli.  She was discharged home with home health with a prescription for 5 days of cefdinir.

## 2017-12-14 NOTE — ASSESSMENT & PLAN NOTE
Takes lantus 35 units qHS and glimeperide 2 mg with lunch.  Give levemir 20 units qHS.  ADA diet.  Acuchecks with low dose SSI.  Pattern glucose and adjust insulin dose as necessary.

## 2017-12-14 NOTE — HPI
Sherin Ortiz is a 72 y.o.  woman with morbid obesity, hypertension, hyperlipidemia, diabetes mellitus type 2 (on insulin therapy) with retinopathy, neuropathy, and chronic kidney disease stage 3, nephrolithiasis, chronic venous hypertension with leg edema, bilateral tinea pedis, frequent UTIs, and chronic pyuria and bacteriuria with positive nitrite, even when she is not ill.  She has been dependent on a motorized wheelchair since her 50s.  She lives in [place] and receives her outpatient medical care at [facility].  Her primary care physician is Dr. Ame Vasquez.  Her nephrologist is Dr. Cynthia Choi.  She is followed by HERMES Mccoy in Endocrinology clinic.  She is followed by NP Akosua Alvarenga in wound care clinic for bilateral leg wounds.  She has a penicillin allergy but has tolerated cephalosporins.  She typically gets hypothermia and leukopenia when she has urinary tract infections (she was hospitalized at [facility] from 11/6/14-11/9/14 for evaluation of this, with no other etiology found other than a UTI).  She gets home health for wound care.              She presented to [facility] ED on 12/13/17 with fatigue, weakness, dizziness, chills, and tremors beginning on Monday 12/11/17.   She denies fever, dysuria, flank pain, and urinary frequency.  She had hypothermia (94 degrees Fahrenheit), so her daughter believed she had another UTI.  She continued to have hypothermia in the ED.  WBC count was mildly low (3,860).  Urinalysis unsurprisingly showed positive nitrite, pyuria, and bacteriuria.  Ceftriaxone and a liter of IV saline were given.  She was admitted to [facility].

## 2017-12-14 NOTE — ASSESSMENT & PLAN NOTE
Venous insufficiency of leg  Chronic.  Is followed outpatient in wound care clinic and receives wound twice weekly with home health.  Consult wound care; appreciate assistance.

## 2017-12-14 NOTE — PLAN OF CARE
Problem: Physical Therapy Goal  Goal: Physical Therapy Goal  Goals to be met by: 12/30/17     Patient will increase functional independence with mobility by performing:    Supine <> sit mod I without use of bedrails  SIt <>stand CGA at EOB  SPT bed to wheelchair or BSC (bariatric size needed) with min A  Perform LE TE and stretches x 10 reps bilaterally    Outcome: Ongoing (interventions implemented as appropriate)  Initial evaluation completed. PT to follow with recs for SNF at discharge

## 2017-12-15 ENCOUNTER — TELEPHONE (OUTPATIENT)
Dept: FAMILY MEDICINE | Facility: CLINIC | Age: 72
End: 2017-12-15

## 2017-12-15 VITALS
SYSTOLIC BLOOD PRESSURE: 110 MMHG | HEART RATE: 68 BPM | HEIGHT: 67 IN | DIASTOLIC BLOOD PRESSURE: 55 MMHG | BODY MASS INDEX: 45.99 KG/M2 | OXYGEN SATURATION: 97 % | RESPIRATION RATE: 16 BRPM | WEIGHT: 293 LBS | TEMPERATURE: 98 F

## 2017-12-15 LAB
ANION GAP SERPL CALC-SCNC: 7 MMOL/L
BACTERIA UR CULT: NORMAL
BASOPHILS # BLD AUTO: 0 K/UL
BASOPHILS NFR BLD: 0 %
BUN SERPL-MCNC: 19 MG/DL
CALCIUM SERPL-MCNC: 9.3 MG/DL
CHLORIDE SERPL-SCNC: 113 MMOL/L
CO2 SERPL-SCNC: 23 MMOL/L
CREAT SERPL-MCNC: 1.4 MG/DL
DIFFERENTIAL METHOD: ABNORMAL
EOSINOPHIL # BLD AUTO: 0 K/UL
EOSINOPHIL NFR BLD: 1.2 %
ERYTHROCYTE [DISTWIDTH] IN BLOOD BY AUTOMATED COUNT: 14.3 %
EST. GFR  (AFRICAN AMERICAN): 43 ML/MIN/1.73 M^2
EST. GFR  (NON AFRICAN AMERICAN): 37 ML/MIN/1.73 M^2
GLUCOSE SERPL-MCNC: 67 MG/DL
HCT VFR BLD AUTO: 34.2 %
HGB BLD-MCNC: 10.7 G/DL
LYMPHOCYTES # BLD AUTO: 1.1 K/UL
LYMPHOCYTES NFR BLD: 31.3 %
MCH RBC QN AUTO: 28.6 PG
MCHC RBC AUTO-ENTMCNC: 31.3 G/DL
MCV RBC AUTO: 91 FL
MONOCYTES # BLD AUTO: 0.3 K/UL
MONOCYTES NFR BLD: 9.6 %
NEUTROPHILS # BLD AUTO: 2 K/UL
NEUTROPHILS NFR BLD: 57.9 %
PLATELET # BLD AUTO: 172 K/UL
PMV BLD AUTO: 9.4 FL
POCT GLUCOSE: 42 MG/DL (ref 70–110)
POCT GLUCOSE: 92 MG/DL (ref 70–110)
POCT GLUCOSE: 92 MG/DL (ref 70–110)
POTASSIUM SERPL-SCNC: 4.9 MMOL/L
RBC # BLD AUTO: 3.74 M/UL
SODIUM SERPL-SCNC: 143 MMOL/L
WBC # BLD AUTO: 3.42 K/UL

## 2017-12-15 PROCEDURE — 90471 IMMUNIZATION ADMIN: CPT | Performed by: HOSPITALIST

## 2017-12-15 PROCEDURE — G0009 ADMIN PNEUMOCOCCAL VACCINE: HCPCS | Performed by: HOSPITALIST

## 2017-12-15 PROCEDURE — 36415 COLL VENOUS BLD VENIPUNCTURE: CPT

## 2017-12-15 PROCEDURE — 80048 BASIC METABOLIC PNL TOTAL CA: CPT

## 2017-12-15 PROCEDURE — 85025 COMPLETE CBC W/AUTO DIFF WBC: CPT

## 2017-12-15 PROCEDURE — G0378 HOSPITAL OBSERVATION PER HR: HCPCS

## 2017-12-15 PROCEDURE — 90670 PCV13 VACCINE IM: CPT | Performed by: HOSPITALIST

## 2017-12-15 PROCEDURE — 25000003 PHARM REV CODE 250: Performed by: NURSE PRACTITIONER

## 2017-12-15 PROCEDURE — 94761 N-INVAS EAR/PLS OXIMETRY MLT: CPT

## 2017-12-15 PROCEDURE — 63600175 PHARM REV CODE 636 W HCPCS: Performed by: HOSPITALIST

## 2017-12-15 RX ORDER — CEFDINIR 300 MG/1
300 CAPSULE ORAL 2 TIMES DAILY
Qty: 10 CAPSULE | Refills: 0 | Status: SHIPPED | OUTPATIENT
Start: 2017-12-15 | End: 2017-12-20

## 2017-12-15 RX ADMIN — CEFTRIAXONE 2 G: 2 INJECTION, SOLUTION INTRAVENOUS at 06:12

## 2017-12-15 RX ADMIN — HYDROCHLOROTHIAZIDE 25 MG: 25 TABLET ORAL at 08:12

## 2017-12-15 RX ADMIN — ASPIRIN 81 MG 81 MG: 81 TABLET ORAL at 08:12

## 2017-12-15 RX ADMIN — MICONAZOLE NITRATE: 20 CREAM TOPICAL at 08:12

## 2017-12-15 RX ADMIN — PNEUMOCOCCAL 13-VALENT CONJUGATE VACCINE 0.5 ML: 2.2; 2.2; 2.2; 2.2; 2.2; 4.4; 2.2; 2.2; 2.2; 2.2; 2.2; 2.2; 2.2 INJECTION, SUSPENSION INTRAMUSCULAR at 11:12

## 2017-12-15 RX ADMIN — CLOPIDOGREL BISULFATE 75 MG: 75 TABLET ORAL at 08:12

## 2017-12-15 NOTE — DISCHARGE SUMMARY
[facility]  Discharge Summary      Patient Name: Sherin Ortiz  MRN: [id number]  Admission Date: 12/13/2017  Hospital Length of Stay: 0 days  Discharge Date and Time: 12/15/2017  1:51 PM  Attending Physician: Oscar Bloom MD   Discharging Provider: Oscar Bloom MD  Primary Care Provider: Aem Vasquez MD      HPI:   Sherin Ortiz is a 72 y.o.  woman with morbid obesity, hypertension, hyperlipidemia, diabetes mellitus type 2 (on insulin therapy) with retinopathy, neuropathy, and chronic kidney disease stage 3, nephrolithiasis, chronic venous hypertension with leg edema, bilateral tinea pedis, frequent UTIs, and chronic pyuria and bacteriuria with positive nitrite, even when she is not ill.  She has been dependent on a motorized wheelchair since her 50s.  She lives in [place] and receives her outpatient medical care at [facility].  Her primary care physician is Dr. Ame Vasquez.  Her nephrologist is Dr. Cynthia Choi.  She is followed by HERMES Mccoy in Endocrinology clinic.  She is followed by HERMES Alvarenga in wound care clinic for bilateral leg wounds.  She has a penicillin allergy but has tolerated cephalosporins.  She typically gets hypothermia and leukopenia when she has urinary tract infections (she was hospitalized at [facility] from 11/6/14-11/9/14 for evaluation of this, with no other etiology found other than a UTI).  She gets home health for wound care.              She presented to [facility] ED on 12/13/17 with fatigue, weakness, dizziness, chills, and tremors beginning on Monday 12/11/17.   She denies fever, dysuria, flank pain, and urinary frequency.  She had hypothermia (94 degrees Fahrenheit), so her daughter believed she had another UTI.  She continued to have hypothermia in the ED.  WBC count was mildly low (3,860).  Urinalysis unsurprisingly showed positive nitrite, pyuria, and  bacteriuria.  Ceftriaxone and a liter of IV saline were given.  She was admitted to [facility].      Hospital Course:   Ceftriaxone was continued.  Hypothermia resolved and she reported feeling better.  WBC count was still mildly low.  She complained of right shoulder pain since lifting a pot recently, so Physical and Occupational Therapy were consulted.  They recommended skilled nursing facility, but Ms. Ortiz refused and requested home health.  Urine culture grew E coli.  She was discharged home with home health with a prescription for 5 days of cefdinir.     Consults: Physical and occupational therapy    Final Active Diagnoses:    Diagnosis Date Noted POA    Recurrent UTI [N39.0] 12/14/2017 Yes    Right shoulder strain, initial encounter [S46.911A] 12/14/2017 Yes    Bacteriuria with pyuria [N39.0] 12/13/2017 Yes     Chronic    Uncontrolled type 2 diabetes mellitus with stage 3 chronic kidney disease, with long-term current use of insulin [E11.22, E11.65, N18.3, Z79.4] 11/08/2016 Not Applicable     Chronic    Chronic venous hypertension with ulcer involving both sides [I87.313] 10/19/2016 Yes     Chronic    Venous insufficiency of leg [I87.2] 04/12/2016 Yes     Chronic    Wheelchair dependent [Z99.3] 11/29/2015 Not Applicable     Chronic    Diabetic peripheral neuropathy associated with type 2 diabetes mellitus [E11.42] 09/18/2015 Yes     Chronic    Morbid obesity with BMI of 40.0-44.9, adult [E66.01, Z68.41] 02/18/2015 Not Applicable    Stage 3 chronic kidney disease due to type 2 diabetes mellitus [E11.22, N18.3] 09/12/2013 Yes     Chronic    Benign essential hypertension [I10] 11/14/2012 Yes     Chronic    Hyperlipidemia LDL goal <100 [E78.5] 08/14/2012 Yes     Chronic    Tinea pedis [B35.3] 07/24/2012 Yes     Chronic      Problems Resolved During this Admission:    Diagnosis Date Noted Date Resolved POA    PRINCIPAL PROBLEM:  Hypothermia [T68.XXXA] 11/06/2014 12/14/2017 Yes     Hyperkalemia [E87.5] 01/27/2016 12/14/2017 Yes     Review of Systems   Respiratory: Negative for cough and shortness of breath.    Gastrointestinal: Negative for nausea and vomiting.     Physical Exam   Constitutional: She is oriented to person, place, and time. She appears well-developed. No distress.   Pulmonary/Chest: Effort normal. No respiratory distress.   Neurological: She is alert and oriented to person, place, and time.   Psychiatric: She has a normal mood and affect.   Nursing note and vitals reviewed.    Discharged Condition: good    Disposition: Home-Health Care [facility]    Follow Up:  Follow-up Information     Ame Vasquez MD On 12/27/2017.    Specialty:  Internal Medicine  Why:  Outpatient Services, follow up appointment at 11:30am  Contact information:  [address]  [facility] [address]  [phone]             [facility] On 12/16/2017.    Specialty:  Home Health Services  Why:  Home Health  Contact information:  [address]  [address]  [facility] [address]  [phone]                 Patient Instructions:     Diet Diabetic 2000 Calories     Activity as tolerated     Call MD for:  increased confusion or weakness     Call MD for:  persistent nausea and vomiting or diarrhea     Change dressing (specify)   Order Comments: Resume prior wound care orders         Significant Diagnostic Studies:   CT Renal Stone Study Abd Pelvis WO 12/13/17: The lung bases are unremarkable.  Coronary artery calcifications are present.  The heart is otherwise unremarkable.  There are calcifications of the abdominal aorta and its branch vessels.  There is no evidence of vascular occlusion.  There is no evidence of lymphadenopathy in the abdomen or pelvis.  There is a small hiatal hernia.  The distal stomach is unremarkable.  The duodenum is within normal limits.  The small bowel loops are unremarkable.  The appendix is is not conclusively identified.  There are no secondary findings of acute  appendicitis.  There are scattered colonic diverticula without evidence of acute diverticulitis.  No evidence of bowel obstruction is present.  The liver is unremarkable.  The gallbladder is decompressed.  The biliary tree is within normal limits.  There are calcifications in the spleen.  There is fatty atrophy of the pancreas.  Nonspecific hazy changes are noted in the pancreatic head.  No peripancreatic fluid collection is identified.  The adrenal glands are unremarkable.  There is no evidence of nephrolithiasis.  Previous right sided hydroureteronephrosis has resolved.  The urinary bladder and the ureters are unremarkable.  The uterus is within normal limits.  There is a 4.7 x 3.6 cm cystic lesion in the right adnexa.  This is stable compared to the prior examination.  No evidence of free fluid is identified in the abdomen or pelvis.  There is no evidence of free air.  There is no evidence of pneumatosis.  The psoas margins are unremarkable.  There are postoperative changes in the anterior abdominal wall.  No evidence of recurrent hernia is identified.  There are degenerative changes in the osseous structures.  No evidence of a fracture is present.  Impression:  No evidence of nephrolithiasis or evidence of obstructive uropathy.  Resolution of previous severe right-sided hydroureteronephrosis.  Minimally hazy changes surrounding the pancreatic head.  Suggest correlation with amylase and lipase levels.  Status post repair of ventral abdominal wall hernia.  No evidence of recurrent hernia.  Diverticulosis coli without evidence of acute diverticulitis.  Stable cystic lesion in the right adnexa.  Additional findings as above.     Medications:  Reconciled Home Medications:   Current Discharge Medication List      START taking these medications    Details   cefdinir (OMNICEF) 300 MG capsule Take 1 capsule (300 mg total) by mouth 2 (two) times daily.  Qty: 10 capsule, Refills: 0         CONTINUE these medications which  "have NOT CHANGED    Details   ACCU-CHEK [place] PLUS [place]ER [facility]       ACCU-CHEK RAMIRO PLUS TEST STRP Strp TEST TWICE DAILY  Qty: 200 strip, Refills: 3      ACCU-CHEK SOFTCLIX LANCETS Misc       aspirin 81 MG Chew Take 81 mg by mouth once daily.        atorvastatin (LIPITOR) 40 MG tablet TAKE 1 TABLET EVERY EVENING  Qty: 90 tablet, Refills: 3      BD INSULIN SYRINGE ULTRA-FINE 1/2 mL 30 gauge x 1/2" Syrg USE EVERY NIGHT  Qty: 90 each, Refills: 3      clopidogrel (PLAVIX) 75 mg tablet TAKE 1 TABLET ONE TIME DAILY  Qty: 90 tablet, Refills: 3      diclofenac sodium 1 % Gel Apply 2 g topically nightly as needed.  Qty: 1 Tube, Refills: 0    Associated Diagnoses: Stage 3 chronic kidney disease due to type 2 diabetes mellitus; Uncontrolled type 2 diabetes mellitus with stage 3 chronic kidney disease, with long-term current use of insulin; Right shoulder pain, unspecified chronicity      econazole nitrate 1 % cream Apply 1 application topically once daily. Apply to affected area  Qty: 85 g, Refills: 2    Associated Diagnoses: Tinea pedis of both feet      ergocalciferol (ERGOCALCIFEROL) 50,000 unit Cap Take 1 capsule (50,000 Units total) by mouth every 7 days.  Qty: 12 capsule, Refills: 3      glimepiride (AMARYL) 1 MG tablet Take 2 tablets (2 mg total) by mouth with lunch.  Qty: 180 tablet, Refills: 3      hydrochlorothiazide (HYDRODIURIL) 25 MG tablet Take 1 tablet (25 mg total) by mouth once daily.  Qty: 30 tablet, Refills: 3      LANTUS 100 unit/mL injection INJECT 7 TO 10 UNITS SUBCUTANEOUSLY IN THE EVENING DEPENDING ON SCALE (DISCARD EACH VIAL AFTER 28 DAYS)  Qty: 30 mL, Refills: 3      lisinopril 10 MG tablet TAKE 1 TABLET ONE TIME DAILY  Qty: 90 tablet, Refills: 3      triamcinolone acetonide 0.1% (KENALOG) 0.1 % cream APPLY TOPICALLY TO BOTH LEGS Q MONDAY AND FRIDAY  Refills: 1             Indwelling Lines/Drains at time of discharge:   Lines/Drains/Airways     Pressure Ulcer                 Pressure Ulcer 09/17/15 " 2221 Right heel Stage  days                Time spent on the discharge of patient: 35 minutes  Patient was seen and examined on the date of discharge and determined to be suitable for discharge.         Oscar Bloom MD  Department of Hospital Medicine  [facility]

## 2017-12-15 NOTE — PLAN OF CARE
[facility] HOME HEALTH ORDERS  FACE TO FACE ENCOUNTER    Patient Name: Sherin Ortiz  YOB: 1945    PCP: Ame Vasquez MD   PCP Address: [address] / [place] [address]  PCP Phone Number: [phone]  PCP Fax: [phone]    Encounter Date: 12/15/2017    Admit to Home Health    Diagnoses:  Active Hospital Problems    Diagnosis  POA    Recurrent UTI [N39.0]  Yes    Right shoulder strain, initial encounter [S46.911A]  Yes    Bacteriuria with pyuria [N39.0]  Yes     Chronic    Uncontrolled type 2 diabetes mellitus with stage 3 chronic kidney disease, with long-term current use of insulin [E11.22, E11.65, N18.3, Z79.4]  Not Applicable     Chronic    Chronic venous hypertension with ulcer involving both sides [I87.313]  Yes     Chronic    Venous insufficiency of leg [I87.2]  Yes     Chronic    Wheelchair dependent [Z99.3]  Not Applicable     Chronic    Diabetic peripheral neuropathy associated with type 2 diabetes mellitus [E11.42]  Yes     Chronic    Morbid obesity with BMI of 40.0-44.9, adult [E66.01, Z68.41]  Not Applicable    Stage 3 chronic kidney disease due to type 2 diabetes mellitus [E11.22, N18.3]  Yes     Chronic    Benign essential hypertension [I10]  Yes     Chronic    Hyperlipidemia LDL goal <100 [E78.5]  Yes     Chronic    Tinea pedis [B35.3]  Yes     Chronic     Tinea pedis is present on both feet.        Resolved Hospital Problems    Diagnosis Date Resolved POA    *Hypothermia [T68.XXXA] 12/14/2017 Yes    Hyperkalemia [E87.5] 12/14/2017 Yes       Future Appointments  Date Time Provider Department Center   1/5/2018 2:00 PM Akosua Alvarenga NP [facility]   1/9/2018 11:30 AM Ame Vasquez MD [facility] IM [facility]     Follow-up Information     Ame Vasquez MD.    Specialty:  Internal Medicine  Why:  As needed  Contact information:  [address]  [place] [address]  [phone]                     I have seen and  examined this patient face to face today. My clinical findings that support the need for the home health skilled services and home bound status are the following:  Weakness/numbness causing balance and gait disturbance due to Weakness/Debility making it taxing to leave home.  Medical restrictions requiring assistance of another human to leave home due to  Wound care needs.    Allergies:  Review of patient's allergies indicates:   Allergen Reactions    Penicillins Hives     Other reaction(s): Hives    Sulfa (sulfonamide antibiotics) Other (See Comments)     Eyad, pt states her doctor told her the shakes were possibly caused by an allergy to sulfa       Diet: diabetic diet: 2000 calorie    Activities: activity as tolerated    Nursing:   SN to complete comprehensive assessment including routine vital signs. Instruct on disease process and s/s of complications to report to MD. Review/verify medication list sent home with the patient at time of discharge  and instruct patient/caregiver as needed. Frequency may be adjusted depending on start of care date.    Notify MD if SBP > 160 or < 90; DBP > 90 or < 50; HR > 120 or < 50; Temp > 101      CONSULTS:    Physical Therapy to evaluate and treat. Evaluate for home safety and equipment needs; Establish/upgrade home exercise program. Perform / instruct on therapeutic exercises, gait training, transfer training, and Range of Motion.  Occupational Therapy to evaluate and treat. Evaluate home environment for safety and equipment needs. Perform/Instruct on transfers, ADL training, ROM, and therapeutic exercises.  Aide to provide assistance with personal care, ADLs, and vital signs.    MISCELLANEOUS CARE:  Wound Care Orders:  follow prior wound care orders    WOUND CARE ORDERS  yes:  feet (follow prior wound care orders)      Medications: Review discharge medications with patient and family and provide education.      Current Discharge Medication List      START taking these  "medications    Details   cefdinir (OMNICEF) 300 MG capsule Take 1 capsule (300 mg total) by mouth 2 (two) times daily.  Qty: 10 capsule, Refills: 0         CONTINUE these medications which have NOT CHANGED    Details   ACCU-CHEK RAMIRO PLUS METER Misc       ACCU-CHEK RAMIRO PLUS TEST STRP Strp TEST TWICE DAILY  Qty: 200 strip, Refills: 3      ACCU-CHEK SOFTCLIX LANCETS Misc       aspirin 81 MG Chew Take 81 mg by mouth once daily.        atorvastatin (LIPITOR) 40 MG tablet TAKE 1 TABLET EVERY EVENING  Qty: 90 tablet, Refills: 3      BD INSULIN SYRINGE ULTRA-FINE 1/2 mL 30 gauge x 1/2" Syrg USE EVERY NIGHT  Qty: 90 each, Refills: 3      clopidogrel (PLAVIX) 75 mg tablet TAKE 1 TABLET ONE TIME DAILY  Qty: 90 tablet, Refills: 3      diclofenac sodium 1 % Gel Apply 2 g topically nightly as needed.  Qty: 1 Tube, Refills: 0    Associated Diagnoses: Stage 3 chronic kidney disease due to type 2 diabetes mellitus; Uncontrolled type 2 diabetes mellitus with stage 3 chronic kidney disease, with long-term current use of insulin; Right shoulder pain, unspecified chronicity      econazole nitrate 1 % cream Apply 1 application topically once daily. Apply to affected area  Qty: 85 g, Refills: 2    Associated Diagnoses: Tinea pedis of both feet      ergocalciferol (ERGOCALCIFEROL) 50,000 unit Cap Take 1 capsule (50,000 Units total) by mouth every 7 days.  Qty: 12 capsule, Refills: 3      glimepiride (AMARYL) 1 MG tablet Take 2 tablets (2 mg total) by mouth with lunch.  Qty: 180 tablet, Refills: 3      hydrochlorothiazide (HYDRODIURIL) 25 MG tablet Take 1 tablet (25 mg total) by mouth once daily.  Qty: 30 tablet, Refills: 3      LANTUS 100 unit/mL injection INJECT 7 TO 10 UNITS SUBCUTANEOUSLY IN THE EVENING DEPENDING ON SCALE (DISCARD EACH VIAL AFTER 28 DAYS)  Qty: 30 mL, Refills: 3      lisinopril 10 MG tablet TAKE 1 TABLET ONE TIME DAILY  Qty: 90 tablet, Refills: 3      triamcinolone acetonide 0.1% (KENALOG) 0.1 % cream APPLY " TOPICALLY TO BOTH LEGS Q MONDAY AND FRIDAY  Refills: 1             I certify that this patient is confined to her home and needs intermittent skilled nursing care, physical therapy and occupational therapy.      Oscar Bloom MD  [facility]  12/15/2017

## 2017-12-15 NOTE — PROGRESS NOTES
Left lower  leg medial anterior area wound 3.5cm x 1.5cm x 0.1cm wound mostly pink granulation tissue, ashley wound dry skin. No induration. Left leg cool, dorsal pulse palpable. Left leg medial side wound 5.0cm x 1.4cm x 0.2 cm with pink granulation tissue. Right lateral  lower  Leg wound 4.2 cm x 0.7cm x 0.1cm pink granulation tissue. Right dorsal 2nd toe wound 0.6cm x 0.6cm x 0.2cm with pink granulation tissue. Small amount sero-sanguineous drainage from each wound. Left leg wounds covered with adaptic, 4x4 gauze, cast padding and ace wraps toe to knee. Adaptic and mepilex border to toe wound. Miconazole cream 2% to dorsal toes and web spaces.  Recommend med-honey  to leg wounds with the cast padding and ace wraps toe to knee. Recommend adaptic to toe wound with mepilx border. Recommend continue Outpatient Wound Care with Yumiko Alvarenga if she wants, as well as Home Health for continued wound care.

## 2017-12-15 NOTE — PT/OT/SLP DISCHARGE
Physical Therapy Discharge Summary    Name: Sherin Ortiz  MRN: [id number]   Principal Problem: Hypothermia     Patient Discharged from acute Physical Therapy on 12/15/17.  Please refer to prior PT noted date on 12/14/17 for functional status.     Assessment:     Patient appropriate for care in another setting.    Objective:     GOALS:    Physical Therapy Goals     Not on file          Multidisciplinary Problems (Resolved)        Problem: Physical Therapy Goal    Goal Priority Disciplines Outcome Goal Variances Interventions   Physical Therapy Goal   (Resolved)     PT/OT, PT Outcome(s) achieved     Description:  Goals to be met by: 12/30/17     Patient will increase functional independence with mobility by performing:    Supine <> sit mod I without use of bedrails  SIt <>stand CGA at EOB  SPT bed to wheelchair or BSC (bariatric size needed) with min A  Perform LE TE and stretches x 10 reps bilaterally                      Reasons for Discontinuation of Therapy Services  Transfer to alternate level of care.      Plan:     Patient Discharged to: Home with Home Health Service. PT/OT recommended SNF but pt refused.    Magdalena Angel, PT  12/15/2017

## 2017-12-15 NOTE — PROGRESS NOTES
11:00am -  met with patient at bedside and confirmed she is still refusing skilled nursing facility (SNF) placement.  Patient request to resume home health care with [facility].  She is aware of discharge today.  Patient reported her daughters will provide transportation home.     11:06am -  faxed home health orders via [employer] to [facility] for resumption of care.

## 2017-12-15 NOTE — PLAN OF CARE
11:10am -  [facility] accepted referral for home health via [facility].   informed them that patient is scheduled for discharge today.       Follow-up With  Details  Why  Contact Info   Ame Vasquez MD  On 12/27/2017  Outpatient Services, follow up appointment at 11:30am  [address]  [facility] [address]  [phone]   [facility]  On 12/16/2017  Home Health  [address]  [address]  [facility] [address]  [phone]           12/15/17 1117   Final Note   Assessment Type Final Discharge Note   Discharge Disposition Home-Health   What phone number can be called within the next 1-3 days to see how you are doing after discharge? [phone]   Hospital Follow Up  Appt(s) scheduled? Yes   Discharge plans and expectations educations in teach back method with documentation complete? Yes   Right Care Referral Info   Post Acute Recommendation Home-care   Referral Type Home Health   Facility Name ([facility])   City, [facility] ([place], [place])

## 2017-12-15 NOTE — NURSING
Notified Valerie MIRZA of pt's low blood glucose 42mg/dl.  Pt. Refused glucose tablets. Provided 2 orange juices and a pack of estuardo crackers.  Will re-check glucose in 20minutes.  Pt. Asymptomatic w/o any complaints or noted distress.

## 2017-12-15 NOTE — PLAN OF CARE
Problem: Patient Care Overview  Goal: Plan of Care Review  Outcome: Ongoing (interventions implemented as appropriate)  Reviewed plan of care with patient.  Patient verbalized understanding.

## 2017-12-15 NOTE — PLAN OF CARE
Problem: Fall Risk (Adult)  Goal: Absence of Falls  Patient will demonstrate the desired outcomes by discharge/transition of care.   Safety measures in progress, bed in lowest position, wheels locked x4, side rails up x2, bed alarm set and audible, call light within reach.  Instructed pt. To utilize call light for any  Assistance or concerns.  Patient verbalized understanding.  Wound care provided per MD order, heels floated off mattress.  Will monitor pt. Throughout shift.

## 2017-12-15 NOTE — PT/OT/SLP PROGRESS
"Occupational Therapy  Visit Attempt    Patient Name:  Sherin Ortiz   MRN:  [id number]    Patient not seen today secondary to declining therapy participation this AM -- "I'm going home today. I don't really want to do anything." Will follow-up later, as time permits.    CLAUDIA Gomez  12/15/2017  "

## 2017-12-15 NOTE — PT/OT/SLP DISCHARGE
Occupational Therapy Discharge Summary    Sherin Ortiz  MRN: [id number]   Principal Problem: Hypothermia      Patient Discharged from acute Occupational Therapy on 12/15.  Please refer to prior OT note dated 12/14 for functional status.    Assessment:      Patient appropriate for care in another setting.    Objective:     GOALS:    Occupational Therapy Goals        Problem: Occupational Therapy Goal    Goal Priority Disciplines Outcome Interventions   Occupational Therapy Goal     OT, PT/OT Ongoing (interventions implemented as appropriate)    Description:  Goals to be met by: 1/14     Patient will increase functional independence with ADLs by performing:    UE Dressing with Modified [place].  LE Dressing with Contact Guard Assistance.  Grooming while seated with Modified [place].  Toileting from bedside commode with Stand-by Assistance for hygiene and clothing management.   Toilet transfer to bedside commode with Stand-by Assistance.  Upper extremity exercise program x10 reps per handout, with assistance as needed.                      Reasons for Discontinuation of Therapy Services  Transfer to alternate level of care.      Plan:     Patient Discharged to: Home with Home Health Service    Neo Covarrubias OT  12/15/2017

## 2017-12-15 NOTE — TELEPHONE ENCOUNTER
Your prescribed Omnicef for the patient however patient is allergic to PCN and Kiara wants to know if you want to change the RX or is if safe for her to take it. Kiara can be reached at [phone]

## 2017-12-15 NOTE — PROGRESS NOTES
Physical Therapy  Visit Attempt    Patient Name:  Sherin Ortiz   MRN:  [id number]    Patient not seen today secondary to wound care nurse working with the pt during 0950 attempt. During 2nd attempt (1135), pt refused therapy reporting she is too tired. Pt educated on importance of mobility and given the option to complete supine exercises but refuses all therapy and reports her legs are fine. Will follow-up at a later time.    Magdalena Angel, PT   12/15/2017

## 2017-12-15 NOTE — CONSULTS
Patient seen regarding wounds lower legs. Venous stasis both lower legs with pink hemosiderin staining. Previous wound left lower leg with wide area healed skin. Wounds remain on right  and left of original wound left leg. Right leg with wound lateral mid lower leg. Small pink wound right 2nd dorsal toe. Dry flaky skin dorsal  Toes. Patient has been treated by Debbie Theriot [facility].

## 2017-12-15 NOTE — PROGRESS NOTES
Pt discharged to home with home health and wound care orders.  All discharge instructions regarding medications and follow up appointments discussed with patient and family.  Verbalized understanding.  Pt transported to main [facility] via wheelchair accompanied by family.  Safety maintained.

## 2017-12-18 LAB
BACTERIA BLD CULT: NORMAL
BACTERIA BLD CULT: NORMAL

## 2017-12-20 ENCOUNTER — TELEPHONE (OUTPATIENT)
Dept: ADMINISTRATIVE | Facility: CLINIC | Age: 72
End: 2017-12-20

## 2017-12-26 ENCOUNTER — TELEPHONE (OUTPATIENT)
Dept: INTERNAL MEDICINE | Facility: CLINIC | Age: 72
End: 2017-12-26

## 2017-12-26 RX ORDER — CIPROFLOXACIN 500 MG/1
500 TABLET ORAL 2 TIMES DAILY
Qty: 14 TABLET | Refills: 0 | Status: ON HOLD | OUTPATIENT
Start: 2017-12-26 | End: 2017-12-31 | Stop reason: HOSPADM

## 2017-12-26 NOTE — TELEPHONE ENCOUNTER
----- Message from Martha Kc sent at 12/26/2017  2:31 PM CST -----  Contact: pt [phone]  Pt would like a script sent to [facility] [phone] to treat a UTI,please advise pt

## 2017-12-28 ENCOUNTER — TELEPHONE (OUTPATIENT)
Dept: INTERNAL MEDICINE | Facility: CLINIC | Age: 72
End: 2017-12-28

## 2017-12-28 DIAGNOSIS — R39.9 LOWER URINARY TRACT SYMPTOMS: Primary | ICD-10-CM

## 2017-12-28 NOTE — TELEPHONE ENCOUNTER
Please tell the nurse that the patient requested Cipro in the last 2 days and should be taking it.    Orders are in for urine but patient is on antibiotic.    I would like the urine processed at an [facility] lab so I make sure I see in on [facility].

## 2017-12-28 NOTE — TELEPHONE ENCOUNTER
"----- Message from David Shabazz sent at 12/27/2017  3:10 PM CST -----  Contact: [facility]/loni [phone]  Patient"s  temperature was 93.3 under her arm, 94.2 orally, and the nurse is concerned about a bladder infection and is requesting orders for a urinalysis and culture.    Thank you      "

## 2017-12-29 ENCOUNTER — HOSPITAL ENCOUNTER (OUTPATIENT)
Facility: HOSPITAL | Age: 72
Discharge: HOME HEALTH | End: 2017-12-31
Attending: EMERGENCY MEDICINE | Admitting: HOSPITALIST
Payer: MEDICARE

## 2017-12-29 ENCOUNTER — LAB VISIT (OUTPATIENT)
Dept: LAB | Facility: HOSPITAL | Age: 72
End: 2017-12-29
Attending: INTERNAL MEDICINE
Payer: MEDICARE

## 2017-12-29 DIAGNOSIS — N39.0 URINARY TRACT INFECTIOUS DISEASE: Primary | ICD-10-CM

## 2017-12-29 DIAGNOSIS — N39.0 URINARY TRACT INFECTION WITHOUT HEMATURIA, SITE UNSPECIFIED: ICD-10-CM

## 2017-12-29 DIAGNOSIS — N12 PYELONEPHRITIS: ICD-10-CM

## 2017-12-29 DIAGNOSIS — N19 RENAL FAILURE, UNSPECIFIED CHRONICITY: Primary | ICD-10-CM

## 2017-12-29 DIAGNOSIS — N17.9 AKI (ACUTE KIDNEY INJURY): ICD-10-CM

## 2017-12-29 LAB
ANION GAP SERPL CALC-SCNC: 10 MMOL/L
BACTERIA #/AREA URNS AUTO: ABNORMAL /HPF
BACTERIA #/AREA URNS HPF: ABNORMAL /HPF
BASOPHILS # BLD AUTO: 0.01 K/UL
BASOPHILS NFR BLD: 0.3 %
BILIRUB UR QL STRIP: NEGATIVE
BILIRUB UR QL STRIP: NEGATIVE
BUN SERPL-MCNC: 29 MG/DL
CALCIUM SERPL-MCNC: 9.9 MG/DL
CHLORIDE SERPL-SCNC: 108 MMOL/L
CLARITY UR REFRACT.AUTO: ABNORMAL
CLARITY UR: ABNORMAL
CO2 SERPL-SCNC: 24 MMOL/L
COLOR UR AUTO: YELLOW
COLOR UR: YELLOW
CREAT SERPL-MCNC: 2.1 MG/DL
DIFFERENTIAL METHOD: ABNORMAL
EOSINOPHIL # BLD AUTO: 0 K/UL
EOSINOPHIL NFR BLD: 0.8 %
ERYTHROCYTE [DISTWIDTH] IN BLOOD BY AUTOMATED COUNT: 14.7 %
EST. GFR  (AFRICAN AMERICAN): 26 ML/MIN/1.73 M^2
EST. GFR  (NON AFRICAN AMERICAN): 23 ML/MIN/1.73 M^2
GLUCOSE SERPL-MCNC: 152 MG/DL
GLUCOSE UR QL STRIP: ABNORMAL
GLUCOSE UR QL STRIP: ABNORMAL
HCT VFR BLD AUTO: 35.5 %
HGB BLD-MCNC: 11.4 G/DL
HGB UR QL STRIP: ABNORMAL
HGB UR QL STRIP: ABNORMAL
KETONES UR QL STRIP: NEGATIVE
KETONES UR QL STRIP: NEGATIVE
LACTATE SERPL-SCNC: 1.2 MMOL/L
LEUKOCYTE ESTERASE UR QL STRIP: ABNORMAL
LEUKOCYTE ESTERASE UR QL STRIP: ABNORMAL
LYMPHOCYTES # BLD AUTO: 1 K/UL
LYMPHOCYTES NFR BLD: 26.8 %
MCH RBC QN AUTO: 28.9 PG
MCHC RBC AUTO-ENTMCNC: 32.1 G/DL
MCV RBC AUTO: 90 FL
MICROSCOPIC COMMENT: ABNORMAL
MICROSCOPIC COMMENT: ABNORMAL
MONOCYTES # BLD AUTO: 0.2 K/UL
MONOCYTES NFR BLD: 5.9 %
NEUTROPHILS # BLD AUTO: 2.3 K/UL
NEUTROPHILS NFR BLD: 65.9 %
NITRITE UR QL STRIP: NEGATIVE
NITRITE UR QL STRIP: NEGATIVE
PH UR STRIP: 5 [PH] (ref 5–8)
PH UR STRIP: 5 [PH] (ref 5–8)
PLATELET # BLD AUTO: 204 K/UL
PMV BLD AUTO: 9.2 FL
POCT GLUCOSE: 171 MG/DL (ref 70–110)
POTASSIUM SERPL-SCNC: 4.9 MMOL/L
PROT UR QL STRIP: NEGATIVE
PROT UR QL STRIP: NEGATIVE
RBC # BLD AUTO: 3.94 M/UL
RBC #/AREA URNS AUTO: 2 /HPF (ref 0–4)
RBC #/AREA URNS HPF: 0 /HPF (ref 0–4)
SODIUM SERPL-SCNC: 142 MMOL/L
SP GR UR STRIP: 1.01 (ref 1–1.03)
SP GR UR STRIP: 1.02 (ref 1–1.03)
SQUAMOUS #/AREA URNS AUTO: 5 /HPF
SQUAMOUS #/AREA URNS HPF: 0 /HPF
URN SPEC COLLECT METH UR: ABNORMAL
URN SPEC COLLECT METH UR: ABNORMAL
UROBILINOGEN UR STRIP-ACNC: NEGATIVE EU/DL
UROBILINOGEN UR STRIP-ACNC: NEGATIVE EU/DL
WBC # BLD AUTO: 3.54 K/UL
WBC #/AREA URNS AUTO: 11 /HPF (ref 0–5)
WBC #/AREA URNS HPF: 30 /HPF (ref 0–5)
WBC CLUMPS URNS QL MICRO: ABNORMAL
YEAST UR QL AUTO: ABNORMAL
YEAST URNS QL MICRO: ABNORMAL

## 2017-12-29 PROCEDURE — 87106 FUNGI IDENTIFICATION YEAST: CPT

## 2017-12-29 PROCEDURE — 99284 EMERGENCY DEPT VISIT MOD MDM: CPT | Mod: 25

## 2017-12-29 PROCEDURE — G0378 HOSPITAL OBSERVATION PER HR: HCPCS

## 2017-12-29 PROCEDURE — 85025 COMPLETE CBC W/AUTO DIFF WBC: CPT

## 2017-12-29 PROCEDURE — 81000 URINALYSIS NONAUTO W/SCOPE: CPT

## 2017-12-29 PROCEDURE — 87040 BLOOD CULTURE FOR BACTERIA: CPT | Mod: 59

## 2017-12-29 PROCEDURE — 80048 BASIC METABOLIC PNL TOTAL CA: CPT

## 2017-12-29 PROCEDURE — 87088 URINE BACTERIA CULTURE: CPT

## 2017-12-29 PROCEDURE — 81001 URINALYSIS AUTO W/SCOPE: CPT

## 2017-12-29 PROCEDURE — 63600175 PHARM REV CODE 636 W HCPCS: Performed by: EMERGENCY MEDICINE

## 2017-12-29 PROCEDURE — 87086 URINE CULTURE/COLONY COUNT: CPT

## 2017-12-29 PROCEDURE — 96374 THER/PROPH/DIAG INJ IV PUSH: CPT

## 2017-12-29 PROCEDURE — 87086 URINE CULTURE/COLONY COUNT: CPT | Mod: 59

## 2017-12-29 PROCEDURE — 83605 ASSAY OF LACTIC ACID: CPT

## 2017-12-29 PROCEDURE — 87205 SMEAR GRAM STAIN: CPT

## 2017-12-29 PROCEDURE — 82962 GLUCOSE BLOOD TEST: CPT

## 2017-12-29 RX ORDER — IBUPROFEN 200 MG
16 TABLET ORAL
Status: DISCONTINUED | OUTPATIENT
Start: 2017-12-29 | End: 2017-12-31 | Stop reason: HOSPADM

## 2017-12-29 RX ORDER — RAMELTEON 8 MG/1
8 TABLET ORAL NIGHTLY PRN
Status: DISCONTINUED | OUTPATIENT
Start: 2017-12-29 | End: 2017-12-31 | Stop reason: HOSPADM

## 2017-12-29 RX ORDER — CEFTRIAXONE 1 G/1
1 INJECTION, POWDER, FOR SOLUTION INTRAMUSCULAR; INTRAVENOUS
Status: COMPLETED | OUTPATIENT
Start: 2017-12-29 | End: 2017-12-29

## 2017-12-29 RX ORDER — DOXYLAMINE SUCCINATE 25 MG
TABLET ORAL 2 TIMES DAILY
Status: DISCONTINUED | OUTPATIENT
Start: 2017-12-29 | End: 2017-12-31 | Stop reason: HOSPADM

## 2017-12-29 RX ORDER — ATORVASTATIN CALCIUM 40 MG/1
40 TABLET, FILM COATED ORAL NIGHTLY
Status: DISCONTINUED | OUTPATIENT
Start: 2017-12-29 | End: 2017-12-31 | Stop reason: HOSPADM

## 2017-12-29 RX ORDER — ONDANSETRON 8 MG/1
8 TABLET, ORALLY DISINTEGRATING ORAL EVERY 8 HOURS PRN
Status: DISCONTINUED | OUTPATIENT
Start: 2017-12-29 | End: 2017-12-31 | Stop reason: HOSPADM

## 2017-12-29 RX ORDER — INSULIN ASPART 100 [IU]/ML
0-5 INJECTION, SOLUTION INTRAVENOUS; SUBCUTANEOUS
Status: DISCONTINUED | OUTPATIENT
Start: 2017-12-29 | End: 2017-12-31 | Stop reason: HOSPADM

## 2017-12-29 RX ORDER — ENOXAPARIN SODIUM 100 MG/ML
30 INJECTION SUBCUTANEOUS EVERY 24 HOURS
Status: DISCONTINUED | OUTPATIENT
Start: 2017-12-29 | End: 2017-12-31 | Stop reason: HOSPADM

## 2017-12-29 RX ORDER — NAPROXEN SODIUM 220 MG/1
81 TABLET, FILM COATED ORAL DAILY
Status: DISCONTINUED | OUTPATIENT
Start: 2017-12-30 | End: 2017-12-31 | Stop reason: HOSPADM

## 2017-12-29 RX ORDER — GLUCAGON 1 MG
1 KIT INJECTION
Status: DISCONTINUED | OUTPATIENT
Start: 2017-12-29 | End: 2017-12-31 | Stop reason: HOSPADM

## 2017-12-29 RX ORDER — TRIAMCINOLONE ACETONIDE 1 MG/G
CREAM TOPICAL
Status: DISCONTINUED | OUTPATIENT
Start: 2018-01-01 | End: 2017-12-31 | Stop reason: HOSPADM

## 2017-12-29 RX ORDER — IBUPROFEN 200 MG
24 TABLET ORAL
Status: DISCONTINUED | OUTPATIENT
Start: 2017-12-29 | End: 2017-12-31 | Stop reason: HOSPADM

## 2017-12-29 RX ORDER — SODIUM CHLORIDE 0.9 % (FLUSH) 0.9 %
5 SYRINGE (ML) INJECTION
Status: DISCONTINUED | OUTPATIENT
Start: 2017-12-29 | End: 2017-12-31 | Stop reason: HOSPADM

## 2017-12-29 RX ORDER — SODIUM CHLORIDE 9 MG/ML
INJECTION, SOLUTION INTRAVENOUS CONTINUOUS
Status: DISCONTINUED | OUTPATIENT
Start: 2017-12-29 | End: 2017-12-30

## 2017-12-29 RX ORDER — ACETAMINOPHEN 325 MG/1
650 TABLET ORAL EVERY 6 HOURS PRN
Status: DISCONTINUED | OUTPATIENT
Start: 2017-12-29 | End: 2017-12-31 | Stop reason: HOSPADM

## 2017-12-29 RX ORDER — CLOPIDOGREL BISULFATE 75 MG/1
75 TABLET ORAL DAILY
Status: DISCONTINUED | OUTPATIENT
Start: 2017-12-30 | End: 2017-12-31 | Stop reason: HOSPADM

## 2017-12-29 RX ADMIN — CEFTRIAXONE SODIUM 1 G: 1 INJECTION, POWDER, FOR SOLUTION INTRAMUSCULAR; INTRAVENOUS at 09:12

## 2017-12-29 NOTE — TELEPHONE ENCOUNTER
----- Message from Summer Roper sent at 12/29/2017 10:47 AM CST -----  Contact: [facility] [phone]  Type: Home Health (orders, updates, clarifications, etc.)    Home Health Agency/Nurse:    Phone number: [phone]    Reason for call: Patient was given antibiotics for an UTI on Tuesday 12/26 and still not getting better, nurse stating she has declined and is requesting advise,    Comments: Please call her and advise.    Thank you

## 2017-12-30 LAB
ANION GAP SERPL CALC-SCNC: 7 MMOL/L
BACTERIA UR CULT: NORMAL
BACTERIA UR CULT: NORMAL
BUN SERPL-MCNC: 25 MG/DL
CALCIUM SERPL-MCNC: 9.2 MG/DL
CHLORIDE SERPL-SCNC: 111 MMOL/L
CO2 SERPL-SCNC: 26 MMOL/L
CREAT SERPL-MCNC: 1.7 MG/DL
ERYTHROCYTE [DISTWIDTH] IN BLOOD BY AUTOMATED COUNT: 14.8 %
EST. GFR  (AFRICAN AMERICAN): 34 ML/MIN/1.73 M^2
EST. GFR  (NON AFRICAN AMERICAN): 29 ML/MIN/1.73 M^2
GLUCOSE SERPL-MCNC: 64 MG/DL
GRAM STN SPEC: NORMAL
GRAM STN SPEC: NORMAL
HCT VFR BLD AUTO: 30.9 %
HGB BLD-MCNC: 10 G/DL
MAGNESIUM SERPL-MCNC: 1.4 MG/DL
MCH RBC QN AUTO: 29.1 PG
MCHC RBC AUTO-ENTMCNC: 32.4 G/DL
MCV RBC AUTO: 90 FL
PHOSPHATE SERPL-MCNC: 2.6 MG/DL
PLATELET # BLD AUTO: 181 K/UL
PMV BLD AUTO: 9.2 FL
POCT GLUCOSE: 121 MG/DL (ref 70–110)
POCT GLUCOSE: 194 MG/DL (ref 70–110)
POCT GLUCOSE: 76 MG/DL (ref 70–110)
POCT GLUCOSE: 91 MG/DL (ref 70–110)
POCT GLUCOSE: 95 MG/DL (ref 70–110)
POTASSIUM SERPL-SCNC: 4.7 MMOL/L
RBC # BLD AUTO: 3.44 M/UL
SODIUM SERPL-SCNC: 144 MMOL/L
WBC # BLD AUTO: 2.82 K/UL

## 2017-12-30 PROCEDURE — G8987 SELF CARE CURRENT STATUS: HCPCS | Mod: CK

## 2017-12-30 PROCEDURE — 63600175 PHARM REV CODE 636 W HCPCS: Performed by: NURSE PRACTITIONER

## 2017-12-30 PROCEDURE — 85027 COMPLETE CBC AUTOMATED: CPT

## 2017-12-30 PROCEDURE — 94761 N-INVAS EAR/PLS OXIMETRY MLT: CPT

## 2017-12-30 PROCEDURE — 97166 OT EVAL MOD COMPLEX 45 MIN: CPT

## 2017-12-30 PROCEDURE — 84100 ASSAY OF PHOSPHORUS: CPT

## 2017-12-30 PROCEDURE — G8978 MOBILITY CURRENT STATUS: HCPCS | Mod: CL

## 2017-12-30 PROCEDURE — 97530 THERAPEUTIC ACTIVITIES: CPT

## 2017-12-30 PROCEDURE — 80048 BASIC METABOLIC PNL TOTAL CA: CPT

## 2017-12-30 PROCEDURE — 83735 ASSAY OF MAGNESIUM: CPT

## 2017-12-30 PROCEDURE — 25000003 PHARM REV CODE 250: Performed by: HOSPITALIST

## 2017-12-30 PROCEDURE — G8979 MOBILITY GOAL STATUS: HCPCS | Mod: CK

## 2017-12-30 PROCEDURE — 25000003 PHARM REV CODE 250: Performed by: NURSE PRACTITIONER

## 2017-12-30 PROCEDURE — 36415 COLL VENOUS BLD VENIPUNCTURE: CPT

## 2017-12-30 PROCEDURE — 97161 PT EVAL LOW COMPLEX 20 MIN: CPT

## 2017-12-30 PROCEDURE — G0378 HOSPITAL OBSERVATION PER HR: HCPCS

## 2017-12-30 PROCEDURE — G8988 SELF CARE GOAL STATUS: HCPCS | Mod: CJ

## 2017-12-30 RX ORDER — LORAZEPAM/0.9% SODIUM CHLORIDE 100MG/0.1L
2 PLASTIC BAG, INJECTION (ML) INTRAVENOUS
Status: COMPLETED | OUTPATIENT
Start: 2017-12-30 | End: 2017-12-30

## 2017-12-30 RX ORDER — SODIUM,POTASSIUM PHOSPHATES 280-250MG
2 POWDER IN PACKET (EA) ORAL
Status: COMPLETED | OUTPATIENT
Start: 2017-12-30 | End: 2017-12-30

## 2017-12-30 RX ORDER — AMOXICILLIN 250 MG
2 CAPSULE ORAL DAILY
Status: DISCONTINUED | OUTPATIENT
Start: 2017-12-30 | End: 2017-12-31 | Stop reason: HOSPADM

## 2017-12-30 RX ADMIN — MAGNESIUM SULFATE IN WATER 2 G: 40 INJECTION, SOLUTION INTRAVENOUS at 09:12

## 2017-12-30 RX ADMIN — ENOXAPARIN SODIUM 30 MG: 100 INJECTION SUBCUTANEOUS at 01:12

## 2017-12-30 RX ADMIN — POTASSIUM & SODIUM PHOSPHATES POWDER PACK 280-160-250 MG 2 PACKET: 280-160-250 PACK at 05:12

## 2017-12-30 RX ADMIN — MAGNESIUM SULFATE IN WATER 2 G: 40 INJECTION, SOLUTION INTRAVENOUS at 12:12

## 2017-12-30 RX ADMIN — POTASSIUM & SODIUM PHOSPHATES POWDER PACK 280-160-250 MG 2 PACKET: 280-160-250 PACK at 09:12

## 2017-12-30 RX ADMIN — ATORVASTATIN CALCIUM 40 MG: 40 TABLET, FILM COATED ORAL at 08:12

## 2017-12-30 RX ADMIN — ATORVASTATIN CALCIUM 40 MG: 40 TABLET, FILM COATED ORAL at 01:12

## 2017-12-30 RX ADMIN — ASPIRIN 81 MG 81 MG: 81 TABLET ORAL at 08:12

## 2017-12-30 RX ADMIN — POTASSIUM & SODIUM PHOSPHATES POWDER PACK 280-160-250 MG 2 PACKET: 280-160-250 PACK at 08:12

## 2017-12-30 RX ADMIN — INSULIN DETEMIR 20 UNITS: 100 INJECTION, SOLUTION SUBCUTANEOUS at 01:12

## 2017-12-30 RX ADMIN — SODIUM CHLORIDE: 0.9 INJECTION, SOLUTION INTRAVENOUS at 08:12

## 2017-12-30 RX ADMIN — SODIUM CHLORIDE: 0.9 INJECTION, SOLUTION INTRAVENOUS at 01:12

## 2017-12-30 RX ADMIN — MICONAZOLE NITRATE: 20 CREAM TOPICAL at 08:12

## 2017-12-30 RX ADMIN — ENOXAPARIN SODIUM 30 MG: 100 INJECTION SUBCUTANEOUS at 10:12

## 2017-12-30 RX ADMIN — MICONAZOLE NITRATE: 20 CREAM TOPICAL at 01:12

## 2017-12-30 RX ADMIN — INSULIN DETEMIR 20 UNITS: 100 INJECTION, SOLUTION SUBCUTANEOUS at 08:12

## 2017-12-30 RX ADMIN — STANDARDIZED SENNA CONCENTRATE AND DOCUSATE SODIUM 2 TABLET: 8.6; 5 TABLET, FILM COATED ORAL at 02:12

## 2017-12-30 RX ADMIN — CLOPIDOGREL BISULFATE 75 MG: 75 TABLET ORAL at 08:12

## 2017-12-30 NOTE — ASSESSMENT & PLAN NOTE
Stage 3 CKD with due to type 2 DM  BUN/creatinine 29/2.1 up from 19/1.4 on 12/15/17.  Likely related to poor oral intake and cipro usage.  Avoid nephrotoxins.  Hydrate with IVFs.  Daily BMP.

## 2017-12-30 NOTE — ED NOTES
MD aware of rectal temperature, warm blankets placed on patient. Bed in the low position, side rails elevated x 2. Daughter at the bedside.

## 2017-12-30 NOTE — PROGRESS NOTES
"No current facility-administered medications on file prior to encounter.      Current Outpatient Prescriptions on File Prior to Encounter   Medication Sig Dispense Refill    ACCU-CHEK RAMIRO PLUS METER Misc       ACCU-CHEK RAMIRO PLUS TEST STRP Strp TEST TWICE DAILY 200 strip 3    ACCU-CHEK SOFTCLIX LANCETS [facility]       aspirin 81 MG Chew Take 81 mg by mouth once daily.        atorvastatin (LIPITOR) 40 MG tablet TAKE 1 TABLET EVERY EVENING 90 tablet 3    BD INSULIN SYRINGE ULTRA-FINE 1/2 mL 30 gauge x 1/2" Syrg USE EVERY NIGHT 90 each 3    ciprofloxacin HCl (CIPRO) 500 MG tablet Take 1 tablet (500 mg total) by mouth 2 (two) times daily. 14 tablet 0    clopidogrel (PLAVIX) 75 mg tablet TAKE 1 TABLET ONE TIME DAILY 90 tablet 3    diclofenac sodium 1 % Gel Apply 2 g topically nightly as needed. 1 Tube 0    econazole nitrate 1 % cream Apply 1 application topically once daily. Apply to affected area 85 g 2    ergocalciferol (ERGOCALCIFEROL) 50,000 unit Cap Take 1 capsule (50,000 Units total) by mouth every 7 days. (Patient taking differently: Take 50,000 Units by mouth every Monday. ) 12 capsule 3    glimepiride (AMARYL) 1 MG tablet Take 2 tablets (2 mg total) by mouth with lunch. 180 tablet 3    hydrochlorothiazide (HYDRODIURIL) 25 MG tablet Take 1 tablet (25 mg total) by mouth once daily. 30 tablet 3    LANTUS 100 unit/mL injection INJECT 7 TO 10 UNITS SUBCUTANEOUSLY IN THE EVENING DEPENDING ON SCALE (DISCARD EACH VIAL AFTER 28 DAYS) (Patient taking differently: 35 units) 30 mL 3    lisinopril 10 MG tablet TAKE 1 TABLET ONE TIME DAILY 90 tablet 3    triamcinolone acetonide 0.1% (KENALOG) 0.1 % cream APPLY TOPICALLY TO BOTH LEGS Q MONDAY AND FRIDAY  1       "

## 2017-12-30 NOTE — SUBJECTIVE & OBJECTIVE
Interval History: Feeling better. Eating more and reports eating all of her breakfast.     Review of Systems   Constitutional: Negative for chills and fever.   Respiratory: Negative for cough and shortness of breath.    Cardiovascular: Negative for chest pain and palpitations.   Gastrointestinal: Negative for nausea and vomiting.     Objective:     Vital Signs (Most Recent):  Temp: 97.4 °F (36.3 °C) (12/30/17 1606)  Pulse: 71 (12/30/17 1606)  Resp: 17 (12/30/17 1606)  BP: 124/61 (12/30/17 1606)  SpO2: 97 % (12/30/17 1557) Vital Signs (24h Range):  Temp:  [93.2 °F (34 °C)-97.4 °F (36.3 °C)] 97.4 °F (36.3 °C)  Pulse:  [59-78] 71  Resp:  [12-19] 17  SpO2:  [97 %-100 %] 97 %  BP: (118-146)/(51-83) 124/61     Weight: (!) 137.4 kg (303 lb)  Body mass index is 47.46 kg/m².    Intake/Output Summary (Last 24 hours) at 12/30/17 1712  Last data filed at 12/30/17 1009   Gross per 24 hour   Intake              650 ml   Output              255 ml   Net              395 ml      Physical Exam   Constitutional: She is oriented to person, place, and time. She appears well-developed and well-nourished. No distress.   Cardiovascular: Normal rate and regular rhythm.    No murmur heard.  Pulmonary/Chest: Effort normal and breath sounds normal. No respiratory distress. She has no wheezes.   Abdominal: Soft. Bowel sounds are normal. She exhibits no distension. There is no tenderness.   Musculoskeletal: She exhibits edema and tenderness.   BLE wrapped   Neurological: She is alert and oriented to person, place, and time.   Skin: Skin is warm and dry.   Psychiatric: She has a normal mood and affect. Her behavior is normal.   Nursing note and vitals reviewed.      Significant Labs:   CBC:   Recent Labs  Lab 12/29/17  2030 12/30/17  0608   WBC 3.54* 2.82*   HGB 11.4* 10.0*   HCT 35.5* 30.9*    181     CMP:   Recent Labs  Lab 12/29/17 2030 12/30/17  0608    144   K 4.9 4.7    111*   CO2 24 26   * 64*   BUN 29* 25*    CREATININE 2.1* 1.7*   CALCIUM 9.9 9.2   ANIONGAP 10 7*   EGFRNONAA 23* 29*     Magnesium:   Recent Labs  Lab 12/30/17  0608   MG 1.4*     POCT Glucose:   Recent Labs  Lab 12/30/17  0437 12/30/17  1105 12/30/17  1547   POCTGLUCOSE 76 95 121*       Significant Imaging: I have reviewed all pertinent imaging results/findings within the past 24 hours.

## 2017-12-30 NOTE — PLAN OF CARE
Problem: Physical Therapy Goal  Goal: Physical Therapy Goal  Goals to be met by: 18      Patient will increase functional independence with mobility by performin. Supine to sit with [place]  2. Sit to supine with [place]  3. Rolling to Left and right with [place].  4. Sit to stand transfer with [place] without AD  5. Bed to chair transfer with [place] without AD    Outcome: Ongoing (interventions implemented as appropriate)  Initial evaluation complete PT to follow.

## 2017-12-30 NOTE — ASSESSMENT & PLAN NOTE
Uncontrolled type 2 DM with stage 3 CKD, with long-term use of insulin  Chronic.  Holding home glimepiride while inpatient.  Takes lantus 35 units qHS.  Give levemir 20 units qHS.  Pattern glucose and adjust insulin dose as necessary.  Accuchecks with SSI.

## 2017-12-30 NOTE — HPI
Sherin Ortiz is a 72 y.o.  woman with morbid obesity, hypertension, hyperlipidemia, diabetes mellitus type 2 (on insulin therapy) with retinopathy, neuropathy, and chronic kidney disease stage 3, nephrolithiasis, chronic venous hypertension with leg edema, bilateral tinea pedis, frequent UTIs, and chronic pyuria and bacteriuria with positive nitrite, even when she is not ill.  She has been dependent on a motorized wheelchair since her 50s.  She lives in [place] and receives her outpatient medical care at [facility].  Her primary care physician is Dr. Ame Vasquez.  Her nephrologist is Dr. Cynthia Choi.  She is followed by NP Wiliam Mccoy in Endocrinology clinic.  She is followed by NP Akosua Alvarenga in wound care clinic for bilateral leg wounds.  She gets home health for wound care.  She has a penicillin allergy but has tolerated cephalosporins.  She typically gets hypothermia and leukopenia when she has urinary tract infections (she was hospitalized at [facility] from 11/6/14-11/9/14 for evaluation of this, with no other etiology found other than a UTI), and again at [facility] 12/13/17-12/15/17 with UTI and hypothermia.  Urine cultures grew pan-sensitive E. Coli, patient was discharged home to complete cefdinir.  PT/OT were consulted and recommended SNF placement, patient declined and opted for  PT/OT with home health.    Presented to [facility] 12/29/17 with weakness, fatigue, and chills x 2 days.  Again she reported temperature as low as 93F at home.  Denies dysuria, endorses poor appetite and oral intake.  She was prescribed ciprofloxacin 500 mg BID by her PCP on 12/26/17 for presumed UTI with no improvement in symptoms.  Upon arrival to ED patient with WBC count 3.54, temperature 93.8F, lactate 1.2, BUN 29, creatinine 2.1; U/A with 30 WBCs, few bacteria, and 1+ leukocytes.  She was given ceftriaxone and placed on Behr hugger to elevate  temperature.  Admitted to Hospital Medicine's service for observation of LOREN.

## 2017-12-30 NOTE — ED NOTES
Pt. To the ER and placed on cardiac, BP and pulse oximetry monitoring. Cardiac monitor shows NSR with HR- 78, skin PWD. Pt. Was recently discharged from the hospital about a week ago for a UTI. Daughter reports mother having a low temperature at home. Pt. Denies c/o pain or discomfort at this time. Pt. Is not ambulatory at home and uses an electric scooter. Pt. Had wound care today and has been getting treatment for her diabetic ulcers on her feet. Pt. Requests that her bandages remain intact. Dr. Martell at the bedside.

## 2017-12-30 NOTE — H&P
[facility] Hospital Medicine  History & Physical    Patient Name: Sherin Ortiz  MRN: [id number]  Admission Date: 12/29/2017  Attending Physician: Oscar Bloom MD   Primary Care Provider: Ame Vasquez MD         Patient information was obtained from patient, past medical records and ER records.     Subjective:     Principal Problem:LOREN (acute kidney injury)    Chief Complaint:   Chief Complaint   Patient presents with    Low temperature     Recently discharged from this facility after admission for UTI. On oral Cipro since Tuesday. Presents with c/o having low temperature at home (93-94 oral) for past 2 days. States does not feel bad, just feels cold. Presents in no distress.         HPI: Sherin Ortiz is a 72 y.o.  woman with morbid obesity, hypertension, hyperlipidemia, diabetes mellitus type 2 (on insulin therapy) with retinopathy, neuropathy, and chronic kidney disease stage 3, nephrolithiasis, chronic venous hypertension with leg edema, bilateral tinea pedis, frequent UTIs, and chronic pyuria and bacteriuria with positive nitrite, even when she is not ill.  She has been dependent on a motorized wheelchair since her 50s.  She lives in [place] and receives her outpatient medical care at [facility].  Her primary care physician is Dr. Ame Vasquez.  Her nephrologist is Dr. Cynthia Choi.  She is followed by HERMES Mccoy in Endocrinology clinic.  She is followed by HERMES Alvarenga in wound care clinic for bilateral leg wounds.  She gets home health for wound care.  She has a penicillin allergy but has tolerated cephalosporins.  She typically gets hypothermia and leukopenia when she has urinary tract infections (she was hospitalized at [facility] from 11/6/14-11/9/14 for evaluation of this, with no other etiology found other than a UTI), and again at [facility] 12/13/17-12/15/17 with UTI and hypothermia.  Urine cultures  grew pan-sensitive E. Coli, patient was discharged home to complete cefdinir.  PT/OT were consulted and recommended SNF placement, patient declined and opted for  PT/OT with home health.    Presented to [facility] 12/29/17 with weakness, fatigue, and chills x 2 days.  Again she reported temperature as low as 93F at home.  Denies dysuria, endorses poor appetite and oral intake.  She was prescribed ciprofloxacin 500 mg BID by her PCP on 12/26/17 for presumed UTI with no improvement in symptoms.  Upon arrival to ED patient with WBC count 3.54, temperature 93.8F, lactate 1.2, BUN 29, creatinine 2.1; U/A with 30 WBCs, few bacteria, and 1+ leukocytes.  She was given ceftriaxone and placed on Behr hugger to elevate temperature.  Admitted to Hospital Medicine's service for observation of LOREN.     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    Benign essential hypertension 11/14/2012    Cataract     s/p phacoemulsification    Chronic kidney disease (CKD), stage III (moderate) 9/12/2013    Diabetic peripheral neuropathy associated with type 2 diabetes mellitus 11/14/2014    causing right hemiparesis    Gait disorder     Hyperlipidemia     Iritis - Both Eyes 6/10/2013    Kidney stone     Lymphedema     Morbid obesity with BMI of 40.0-44.9, adult 2/18/2015    Nephrolithiasis 4/20/2016    NS (nuclear sclerosis) 4/1/2013    Nuclear sclerosis - Both Eyes 4/29/2013    Preseptal cellulitis - Right Eye 4/29/2013    Proliferative diabetic retinopathy - Both Eyes 4/29/2013    Proliferative diabetic retinopathy, both eyes 4/1/2013    PSC (posterior subcapsular cataract) - Both Eyes 4/29/2013    S/P hernia repair 12/19/2012    TIA (transient ischemic attack) 11/18/2014    Tinea pedis 7/24/2012    Tinea pedis is present on both feet.     Type 2 diabetes mellitus with renal manifestations, controlled 12/12/2013    Type 2 diabetes, controlled, with moderate  "nonproliferative diabetic retinopathy without macular edema 9/17/2015    Ulcer of left lower extremity, limited to breakdown of skin 7/8/2015    Unspecified cerebral artery occlusion with cerebral infarction 11/16/2014    Unspecified venous (peripheral) insufficiency     Ureteral stone with hydronephrosis 1/27/2016    UTI (lower urinary tract infection)     Vaginal infection     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       Past Surgical History:   Procedure Laterality Date    APPENDECTOMY      CATARACT EXTRACTION W/  INTRAOCULAR LENS IMPLANT Left 5/21/2013    CATARACT EXTRACTION W/  INTRAOCULAR LENS IMPLANT Right 6/4/2013    CHOLECYSTECTOMY      COLONOSCOPY  12/22/2005    normal    ESOPHAGOGASTRODUODENOSCOPY  12/21/2015    hiatal hernia, Schatzki ring    EYE SURGERY Bilateral 2008    laser surgery both eyes    NASAL SEPTUM SURGERY      SUBTOTAL COLECTOMY  12/13/2012    transverse colon, for incarcerated umbilical hernia, Dr. Kat Bower       Review of patient's allergies indicates:   Allergen Reactions    Penicillins Hives     Other reaction(s): Hives    Sulfa (sulfonamide antibiotics) Other (See Comments)     Shakes, pt states her doctor told her the shakes were possibly caused by an allergy to sulfa       No current facility-administered medications on file prior to encounter.      Current Outpatient Prescriptions on File Prior to Encounter   Medication Sig    ACCU-CHEK RAMIRO PLUS METER Misc     ACCU-CHEK RAMIRO PLUS TEST STRP Strp TEST TWICE DAILY    ACCU-CHEK SOFTCLIX LANCETS Misc     aspirin 81 MG Chew Take 81 mg by mouth once daily.      atorvastatin (LIPITOR) 40 MG tablet TAKE 1 TABLET EVERY EVENING    BD INSULIN SYRINGE ULTRA-FINE 1/2 mL 30 gauge x 1/2" Syrg USE EVERY NIGHT    ciprofloxacin HCl (CIPRO) 500 MG tablet Take 1 tablet (500 mg total) by mouth 2 (two) times daily.    clopidogrel (PLAVIX) 75 mg tablet TAKE 1 TABLET ONE TIME DAILY    diclofenac sodium 1 % Gel " Apply 2 g topically nightly as needed.    econazole nitrate 1 % cream Apply 1 application topically once daily. Apply to affected area    ergocalciferol (ERGOCALCIFEROL) 50,000 unit Cap Take 1 capsule (50,000 Units total) by mouth every 7 days. (Patient taking differently: Take 50,000 Units by mouth every Monday. )    glimepiride (AMARYL) 1 MG tablet Take 2 tablets (2 mg total) by mouth with lunch.    hydrochlorothiazide (HYDRODIURIL) 25 MG tablet Take 1 tablet (25 mg total) by mouth once daily.    LANTUS 100 unit/mL injection INJECT 7 TO 10 UNITS SUBCUTANEOUSLY IN THE EVENING DEPENDING ON SCALE (DISCARD EACH VIAL AFTER 28 DAYS) (Patient taking differently: 35 units)    lisinopril 10 MG tablet TAKE 1 TABLET ONE TIME DAILY    triamcinolone acetonide 0.1% (KENALOG) 0.1 % cream APPLY TOPICALLY TO BOTH LEGS Q MONDAY AND FRIDAY     Family History     Problem Relation (Age of Onset)    Cataracts Sister, Brother    Diabetes Sister    Heart disease Brother    Leukemia Mother        Social History Main Topics    Smoking status: Former Smoker     Packs/day: 0.50     Years: 15.00     Types: Cigarettes     Quit date: 7/9/1982    Smokeless tobacco: Former User      Comment: smoked one pack per week    Alcohol use No    Drug use: No    Sexual activity: Not Currently     Review of Systems   Constitutional: Positive for chills and fatigue. Negative for diaphoresis and fever.   HENT: Negative for congestion, trouble swallowing and voice change.    Eyes: Negative for photophobia, pain and visual disturbance.   Respiratory: Negative for cough, shortness of breath and wheezing.    Cardiovascular: Negative for chest pain, palpitations and leg swelling.   Gastrointestinal: Negative for abdominal pain, nausea and vomiting.   Endocrine: Negative for cold intolerance and heat intolerance.   Genitourinary: Negative for dysuria, frequency and urgency.   Musculoskeletal: Negative for arthralgias, gait problem and myalgias.   Skin:  Positive for wound. Negative for color change, pallor and rash.        Chronic wounds to lower extremities.   Neurological: Positive for weakness. Negative for dizziness and headaches.   Hematological: Does not bruise/bleed easily.   Psychiatric/Behavioral: Negative for agitation, confusion and hallucinations.     Objective:     Vital Signs (Most Recent):  Temp: (!) 93.2 °F (34 °C) (12/29/17 2148)  Pulse: 65 (12/29/17 2148)  Resp: 16 (12/29/17 2148)  BP: (!) 132/51 (12/29/17 2148)  SpO2: 98 % (12/30/17 0004) Vital Signs (24h Range):  Temp:  [93.2 °F (34 °C)-93.8 °F (34.3 °C)] 93.2 °F (34 °C)  Pulse:  [62-73] 65  Resp:  [12-16] 16  SpO2:  [98 %-100 %] 98 %  BP: (118-140)/(51-83) 132/51     Weight: (!) 137.4 kg (303 lb)  Body mass index is 47.46 kg/m².    Physical Exam   Constitutional: She is oriented to person, place, and time. She appears well-developed and well-nourished. No distress.   HENT:   Head: Normocephalic and atraumatic.   Mouth/Throat: Oropharynx is clear and moist.   Eyes: EOM are normal. Pupils are equal, round, and reactive to light. No scleral icterus.   Neck: Normal range of motion. Neck supple. No tracheal deviation present.   Cardiovascular: Normal rate, regular rhythm and intact distal pulses.    Pulmonary/Chest: Effort normal and breath sounds normal. No respiratory distress. She has no wheezes.   Abdominal: Soft. Bowel sounds are normal. She exhibits no distension. There is no tenderness.   Musculoskeletal: Normal range of motion. She exhibits no edema or tenderness.   Neurological: She is alert and oriented to person, place, and time. She displays no tremor. GCS eye subscore is 4. GCS verbal subscore is 5. GCS motor subscore is 6.   Skin: Capillary refill takes less than 2 seconds. There is erythema. Nails show no clubbing.        Psychiatric: She has a normal mood and affect. Her speech is normal and behavior is normal.   Nursing note and vitals reviewed.        CRANIAL NERVES     CN III, IV,  VI   Pupils are equal, round, and reactive to light.  Extraocular motions are normal.   Left posterior knee                 Significant Labs:   CBC:   Recent Labs  Lab 12/29/17 2030   WBC 3.54*   HGB 11.4*   HCT 35.5*        CMP:   Recent Labs  Lab 12/29/17 2030      K 4.9      CO2 24   *   BUN 29*   CREATININE 2.1*   CALCIUM 9.9   ANIONGAP 10   EGFRNONAA 23*     Lactic Acid:   Recent Labs  Lab 12/29/17 2030   LACTATE 1.2     POCT Glucose:   Recent Labs  Lab 12/29/17 2026   POCTGLUCOSE 171*     Urine Culture: No results for input(s): LABURIN in the last 48 hours.  Urine Studies:   Recent Labs  Lab 12/29/17 2048   COLORU Yellow   APPEARANCEUA Hazy*   PHUR 5.0   SPECGRAV 1.025   PROTEINUA Negative   GLUCUA 3+*   KETONESU Negative   BILIRUBINUA Negative   OCCULTUA 1+*   NITRITE Negative   UROBILINOGEN Negative   LEUKOCYTESUR 1+*   RBCUA 0   WBCUA 30*   BACTERIA Few*   SQUAMEPITHEL 0     All pertinent labs within the past 24 hours have been reviewed.    Significant Imaging: None    Assessment/Plan:     * LOREN (acute kidney injury)    Stage 3 CKD with due to type 2 DM  BUN/creatinine 29/2.1 up from 19/1.4 on 12/15/17.  Likely related to poor oral intake and cipro usage.  Avoid nephrotoxins.  Hydrate with IVFs.  Daily BMP.          Hypothermia    PRN Behr hugger.          Bacteriuria with pyuria    Recurrent UTI  Urine gram stain and culture pending.  Continue ceftriaxone.          Chronic venous hypertension with ulcer involving both sides    Venous insufficiency of leg  Chronic.  Continue prior wound care orders (change dressings every Monday and Thursday--> last changed 12/29/170          Diabetic peripheral neuropathy associated with type 2 diabetes mellitus    Uncontrolled type 2 DM with stage 3 CKD, with long-term use of insulin  Chronic.  Holding home glimepiride while inpatient.  Takes lantus 35 units qHS.  Give levemir 20 units qHS.  Pattern glucose and adjust insulin dose as  necessary.  Accuchecks with SSI.          Benign essential hypertension    Chronic.  Holding lisinopril and HCTZ due to LOREN. Continue to monitor.           Hyperlipidemia LDL goal <100    Chronic.  Continue atorvastatin.          Tinea pedis    Chronic.  Continue antifungal cream BID.            VTE Risk Mitigation         Ordered     enoxaparin injection 30 mg  Daily     Route:  Subcutaneous        12/29/17 2323     High Risk of VTE  Once      12/29/17 2323             Ale Mccracken NP  Department of Hospital Medicine   [facility]

## 2017-12-30 NOTE — PT/OT/SLP EVAL
Physical Therapy Evaluation    Patient Name:  Sherin Ortiz   MRN:  [id number]    Recommendations:     Discharge Recommendations:  home health OT, home health PT (Recommend SNF; however, Pt refuses SNF. )   Discharge Equipment Recommendations: none   Barriers to discharge: Decreased caregiver support    Assessment:     Sherin Ortiz is a 72 y.o. female admitted with a medical diagnosis of LOREN (acute kidney injury).  She presents with the following impairments/functional limitations:  weakness, impaired endurance, impaired self care skills, impaired balance, gait instability, impaired functional mobilty, decreased lower extremity function, decreased upper extremity function, pain.     Rehab Prognosis:  good; patient would benefit from acute skilled PT services to address these deficits and reach maximum level of function.      Recent Surgery: * No surgery found *      Plan:     During this hospitalization, patient to be seen 5 x/week to address the above listed problems via therapeutic activities, therapeutic exercises  · Plan of Care Expires:  01/30/18   Plan of Care Reviewed with: patient, daughter    Subjective     Communicated with YRN Tomas prior to session.  Patient found resting supine with daughter in room upon PT entry to room, agreeable to evaluation.      Chief Complaint: pain in R arm   Patient comments/goals: go home.   Pain/Comfort:  · Pain Rating 1:  (Pt not rate pain. )  · Location - Side 1: Right  · Location 1: arm    Patients cultural, spiritual, Zoroastrianism conflicts given the current situation: None verbalized to PT    Living Environment:  Pt lives in a [facility] with DULCE, with her sister who is bed bound, and a sitter. Pt has uses a power chair for mobility. She is indep with ADLs from her chair, her son and granddaughter live across the street.   Prior to admission, patients level of function was mod I from wheelchair.  Patient has the following equipment: power chair, bedside commode.  DME  owned (not currently used): none.  Upon discharge, patient will have assistance from family.    Objective:     Patient found with: Bed alarm.     General Precautions: Standard, fall   Orthopedic Precautions:N/A   Braces: N/A     Exams:  · Cognitive Exam:  Patient is oriented to Person, Place, Time and Situation and follows 100% of multi-step  commands   · Gross Motor Coordination:  impaired   · Skin Integrity/Edema:      · -       Redness noted under lower abdominal creases.   · RLE ROM: WFL except DF.   · RLE Strength: Grossly 4-/5  · LLE ROM: WFL except DP  · LLE Strength: Grossly 4-/5    Functional Mobility:  · Bed Mobility:     · Rolling Left:  contact guard assistance and side rail  · Rolling Right: contact guard assistance and side rail  · Transfers:     · Sit to Stand:  contact guard assistance with no AD  · Gait: Non-ambulatory at this time.     AM-PAC 6 CLICK MOBILITY  Total Score:14       Therapeutic Activities and Exercises:  -Pt rolled multiple times to change brief with hand rails. Pt dependent for hygiene   -Pt transferred to W/C with CGA and no AD. PT positioned W/C at 45 deg angle for transfer, pt asked PT to move chair to 90 deg with some space. PT educated pt on safety with transfers and WC placement to decrease fall risk. Pt verbalized understanding but preferred chair to be at 90 deg.     Patient left up in chair with all lines intact, call button in reach, YRN Tomas notified and daughter  present.    GOALS:    Physical Therapy Goals        Problem: Physical Therapy Goal    Goal Priority Disciplines Outcome Goal Variances Interventions   Physical Therapy Goal     PT/OT, PT Ongoing (interventions implemented as appropriate)     Description:  Goals to be met by: 18      Patient will increase functional independence with mobility by performin. Supine to sit with Stanton  2. Sit to supine with Stanton  3. Rolling to Left and right with Stanton.  4. Sit to stand transfer  with Maverick without AD  5. 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    Benign essential hypertension 11/14/2012    Cataract     s/p phacoemulsification    Chronic kidney disease (CKD), stage III (moderate) 9/12/2013    Diabetic peripheral neuropathy associated with type 2 diabetes mellitus 11/14/2014    causing right hemiparesis    Gait disorder     Hyperlipidemia     Iritis - Both Eyes 6/10/2013    Kidney stone     Lymphedema     Morbid obesity with BMI of 40.0-44.9, adult 2/18/2015    Nephrolithiasis 4/20/2016    NS (nuclear sclerosis) 4/1/2013    Nuclear sclerosis - Both Eyes 4/29/2013    Preseptal cellulitis - Right Eye 4/29/2013    Proliferative diabetic retinopathy - Both Eyes 4/29/2013    Proliferative diabetic retinopathy, both eyes 4/1/2013    PSC (posterior subcapsular cataract) - Both Eyes 4/29/2013    S/P hernia repair 12/19/2012    TIA (transient ischemic attack) 11/18/2014    Tinea pedis 7/24/2012    Tinea pedis is present on both feet.     Type 2 diabetes mellitus with renal manifestations, controlled 12/12/2013    Type 2 diabetes, controlled, with moderate nonproliferative diabetic retinopathy without macular edema 9/17/2015    Ulcer of left lower extremity, limited to breakdown of skin 7/8/2015    Unspecified cerebral artery occlusion with cerebral infarction 11/16/2014    Unspecified venous (peripheral) insufficiency     Ureteral stone with hydronephrosis 1/27/2016    UTI (lower urinary tract infection)     Vaginal infection     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       Past Surgical History:   Procedure Laterality Date    APPENDECTOMY      CATARACT EXTRACTION W/  INTRAOCULAR LENS IMPLANT Left 5/21/2013    CATARACT EXTRACTION W/  INTRAOCULAR LENS IMPLANT Right 6/4/2013    CHOLECYSTECTOMY      COLONOSCOPY  12/22/2005     normal    ESOPHAGOGASTRODUODENOSCOPY  12/21/2015    hiatal hernia, Schatzki ring    EYE SURGERY Bilateral 2008    laser surgery both eyes    NASAL SEPTUM SURGERY      SUBTOTAL COLECTOMY  12/13/2012    transverse colon, for incarcerated umbilical hernia, Dr. Kat Bower       Clinical Decision Making:     History  Co-morbidities and personal factors that may impact the plan of care Examination  Body Structures and Functions, activity limitations and participation restrictions that may impact the plan of care Clinical Presentation   Decision Making/ Complexity Score   Co-morbidities:   [x] Time since onset of injury / illness / exacerbation  [] Status of current condition  []Patient's cognitive status and safety concerns    [] Multiple Medical Problems (see med hx)  Personal Factors:   [] Patient's age  [x] Prior Level of function   [] Patient's home situation (environment and family support)  [] Patient's level of motivation  [] Expected progression of patient      HISTORY:(criteria)    [] 06683 - no personal factors/history    [x] 18179 - has 1-2 personal factor/comorbidity     [] 72684 - has >3 personal factor/comorbidity     Body Regions:  [x] Objective examination findings  [] Head     []  Neck  [] Trunk   [x] Upper Extremity  [x] Lower Extremity    Body Systems:  [] For communication ability, affect, cognition, language, and learning style: the assessment of the ability to make needs known, consciousness, orientation (person, place, and time), expected emotional /behavioral responses, and learning preferences (eg, learning barriers, education  needs)  [x] For the neuromuscular system: a general assessment of gross coordinated movement (eg, balance, gait, locomotion, transfers, and transitions) and motor function  (motor control and motor learning)  [x] For the musculoskeletal system: the assessment of gross symmetry, gross range of motion, gross strength, height, and weight  [] For the  integumentary system: the assessment of pliability(texture), presence of scar formation, skin color, and skin integrity  [] For cardiovascular/pulmonary system: the assessment of heart rate, respiratory rate, blood pressure, and edema     Activity limitations:    [] Patient's cognitive status and saf ety concerns          [] Status of current condition      [] Weight bearing restriction  [] Cardiopulmunary Restriction    Participation Restrictions:   [] Goals and goal agreement with the patient     [] Rehab potential (prognosis) and probable outcome      Examination of Body System: (criteria)    [] 85340 - addressing 1-2 elements    [] 77846 - addressing a total of 3 or more elements     [x] 59752 -  Addressing a total of 4 or more elements         Clinical Presentation: (criteria)  Stable - 09911     On examination of body system using standardized tests and measures patient presents with 4 or more elements from any of the following: body structures and functions, activity limitations, and/or participation restrictions.  Leading to a clinical presentation that is considered stable and/or uncomplicated                              Clinical Decision Making  (Eval Complexity):  Low- 05317     Time Tracking:     PT Received On: 12/30/17  PT Start Time: 1156     PT Stop Time: 1222  PT Total Time (min): 26 min     Billable Minutes: Evaluation 16 and Therapeutic Activity 10      Tin Alexis, PT, DPT  12/30/2017

## 2017-12-30 NOTE — ED PROVIDER NOTES
Encounter Date: 12/29/2017       History     Chief Complaint   Patient presents with    Low temperature     Recently discharged from this facility after admission for UTI. On oral Cipro since Tuesday. Presents with c/o having low temperature at home (93-94 oral) for past 2 days. States does not feel bad, just feels cold. Presents in no distress.      72-year-old female presents emergency Department with concern for persistently low body temperature.  She was recently admitted with a urinary tract infection, and leukocytosis.  She had a urine culture, growing Escherichia coli, with pan sensitivity.  She was treated with ceftriaxone, and discharged on Ceftin ear.  According to family members on Monday she started feeling ill again.  They called her primary care doctor, who called in some ciprofloxacin.  She took the ciprofloxacin, however is continued to have very low body temperatures.  They have given her heating pads, and multiple blankets.  They called the hotline today, to see if there is some other solution, and they told her they needed to come to the emergency department within 4 hours.          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    Benign essential hypertension 11/14/2012    Cataract     s/p phacoemulsification    Chronic kidney disease (CKD), stage III (moderate) 9/12/2013    Diabetic peripheral neuropathy associated with type 2 diabetes mellitus 11/14/2014    causing right hemiparesis    Gait disorder     Hyperlipidemia     Iritis - Both Eyes 6/10/2013    Kidney stone     Lymphedema     Morbid obesity with BMI of 40.0-44.9, adult 2/18/2015    Nephrolithiasis 4/20/2016     NS (nuclear sclerosis) 4/1/2013    Nuclear sclerosis - Both Eyes 4/29/2013    Preseptal cellulitis - Right Eye 4/29/2013    Proliferative diabetic retinopathy - Both Eyes 4/29/2013    Proliferative diabetic retinopathy, both eyes 4/1/2013    PSC (posterior subcapsular cataract) - Both Eyes 4/29/2013    S/P hernia repair 12/19/2012    TIA (transient ischemic attack) 11/18/2014    Tinea pedis 7/24/2012    Tinea pedis is present on both feet.    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 Kat Bower     Family History   Problem Relation Age of Onset    Diabetes Sister     Cataracts Sister     Heart disease Brother     Cataracts Brother     Leukemia Mother     Cancer Neg Hx     Amblyopia Neg Hx     Blindness Neg Hx     Glaucoma Neg Hx     Hypertension Neg Hx     Macular degeneration Neg Hx     Retinal detachment Neg Hx     Strabismus Neg Hx     Stroke Neg Hx      Thyroid disease Neg Hx     Kidney disease Neg Hx      Social History   Substance Use Topics    Smoking status: Former Smoker     Packs/day: 0.50     Years: 15.00     Types: Cigarettes     Quit date: 7/9/1982    Smokeless tobacco: Former User      Comment: smoked one pack per week    Alcohol use No     Review of Systems   Constitutional: Positive for chills.   Eyes: Negative.    Endocrine: Negative.    All other systems reviewed and are negative.      Physical Exam     Initial Vitals [12/29/17 1939]   BP Pulse Resp Temp SpO2   (!) 140/83 73 16 -- 100 %      MAP       102         Physical Exam    Nursing note and vitals reviewed.  Constitutional: She appears well-developed and well-nourished. She appears distressed.   She is cool to touch with a temp of 93  obese   HENT:   Head: Normocephalic and atraumatic.   Eyes: EOM are normal. Pupils are equal, round, and reactive to light.   Neck: Normal range of motion. Neck supple.   Cardiovascular: Normal rate.   Pulmonary/Chest: No respiratory distress.   Abdominal: Soft.   Musculoskeletal: Normal range of motion.   Neurological: She is alert and oriented to person, place, and time. She has normal strength. No cranial nerve deficit.   Skin: Skin is warm and dry. No rash noted.   Chronic skin changes under pressure dressings just changed today   Psychiatric: She has a normal mood and affect. Thought content normal.         ED Course   Critical Care  Date/Time: 12/30/2017 1:27 AM  Performed by: VIVIAN VARGAS  Authorized by: VEENA CEDILLO   Direct patient critical care time: 5 minutes  Additional history critical care time: 5 minutes  Ordering / reviewing critical care time: 5 minutes  Documentation critical care time: 5 minutes  Consulting other physicians critical care time: 2 minutes  Total critical care time (exclusive of procedural time) : 22 minutes        Labs Reviewed   URINALYSIS - Abnormal; Notable for the following:        Result Value    Appearance, UA  Hazy (*)     Glucose, UA 3+ (*)     Occult Blood UA 1+ (*)     Leukocytes, UA 1+ (*)     All other components within normal limits   CBC W/ AUTO DIFFERENTIAL - Abnormal; Notable for the following:     WBC 3.54 (*)     RBC 3.94 (*)     Hemoglobin 11.4 (*)     Hematocrit 35.5 (*)     RDW 14.7 (*)     Mono # 0.2 (*)     All other components within normal limits   BASIC METABOLIC PANEL - Abnormal; Notable for the following:     Glucose 152 (*)     BUN, Bld 29 (*)     Creatinine 2.1 (*)     eGFR if  26 (*)     eGFR if non  23 (*)     All other components within normal limits   URINALYSIS MICROSCOPIC - Abnormal; Notable for the following:     WBC, UA 30 (*)     WBC Clumps, UA Occasional (*)     Bacteria, UA Few (*)     Yeast, UA Moderate (*)     All other components within normal limits   POCT GLUCOSE - Abnormal; Notable for the following:     POCT Glucose 171 (*)     All other components within normal limits   CULTURE, URINE   CULTURE, BLOOD   CULTURE, BLOOD   LACTIC ACID, PLASMA             Medical Decision Making:   History:   Old Medical Records: I decided to obtain old medical records.  Old Records Summarized: records from previous admission(s).       <> Summary of Records: Previously TSH 0.400 - 4.000 uIU/mL 1.149       Initial Assessment:   72-year-old female presents emergency Department with several days of low body temperature and malaise, without cough, n/v/d  Differential Diagnosis:   Sepsis  (Urinary tract infection, pneumonia/flu, drug, hypoglycemia,) she just recently had a TSH that was normal 2 weeks ago    Clinical Tests:   Lab Tests: Ordered and Reviewed  Radiological Study: Ordered and Reviewed  ED Management:  Given IVF, renal failure may be interstitial nephritis from infection vs drugs. Will admit for renal failure and start rocephin                   ED Course as of Dec 30 0129   Fri Dec 29, 2017   2140 Renal failure, possible pyelonephritis  [MG]      ED Course User  Index  [MG] Susie Martell MD     Clinical Impression:   The primary encounter diagnosis was Renal failure, unspecified chronicity. Diagnoses of Pyelonephritis, Urinary tract infection without hematuria, site unspecified, and LOREN (acute kidney injury) were also pertinent to this visit.                           Susie Martell MD  12/30/17 0129

## 2017-12-30 NOTE — SUBJECTIVE & OBJECTIVE
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    Benign essential hypertension 11/14/2012    Cataract     s/p phacoemulsification    Chronic kidney disease (CKD), stage III (moderate) 9/12/2013    Diabetic peripheral neuropathy associated with type 2 diabetes mellitus 11/14/2014    causing right hemiparesis    Gait disorder     Hyperlipidemia     Iritis - Both Eyes 6/10/2013    Kidney stone     Lymphedema     Morbid obesity with BMI of 40.0-44.9, adult 2/18/2015    Nephrolithiasis 4/20/2016    NS (nuclear sclerosis) 4/1/2013    Nuclear sclerosis - Both Eyes 4/29/2013    Preseptal cellulitis - Right Eye 4/29/2013    Proliferative diabetic retinopathy - Both Eyes 4/29/2013    Proliferative diabetic retinopathy, both eyes 4/1/2013    PSC (posterior subcapsular cataract) - Both Eyes 4/29/2013    S/P hernia repair 12/19/2012    TIA (transient ischemic attack) 11/18/2014    Tinea pedis 7/24/2012    Tinea pedis is present on both feet.     Type 2 diabetes mellitus with renal manifestations, controlled 12/12/2013    Type 2 diabetes, controlled, with moderate nonproliferative diabetic retinopathy without macular edema 9/17/2015    Ulcer of left lower extremity, limited to breakdown of skin 7/8/2015    Unspecified cerebral artery occlusion with cerebral infarction 11/16/2014    Unspecified venous (peripheral) insufficiency     Ureteral stone with hydronephrosis 1/27/2016    UTI (lower urinary tract infection)     Vaginal infection     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       Past Surgical History:   Procedure Laterality Date    APPENDECTOMY      CATARACT EXTRACTION W/  INTRAOCULAR LENS IMPLANT Left 5/21/2013    CATARACT EXTRACTION W/  INTRAOCULAR LENS IMPLANT Right 6/4/2013    CHOLECYSTECTOMY      COLONOSCOPY  12/22/2005    normal    ESOPHAGOGASTRODUODENOSCOPY  12/21/2015    hiatal hernia, Schatzki ring    EYE SURGERY Bilateral 2008  "   laser surgery both eyes    NASAL SEPTUM SURGERY      SUBTOTAL COLECTOMY  12/13/2012    transverse colon, for incarcerated umbilical hernia, Dr. Kat Bower       Review of patient's allergies indicates:   Allergen Reactions    Penicillins Hives     Other reaction(s): Hives    Sulfa (sulfonamide antibiotics) Other (See Comments)     Shakes, pt states her doctor told her the shakes were possibly caused by an allergy to sulfa       No current facility-administered medications on file prior to encounter.      Current Outpatient Prescriptions on File Prior to Encounter   Medication Sig    ACCU-CHEK RAMIRO PLUS METER Misc     ACCU-CHEK RAMIRO PLUS TEST STRP Strp TEST TWICE DAILY    ACCU-CHEK SOFTCLIX LANCETS Misc     aspirin 81 MG Chew Take 81 mg by mouth once daily.      atorvastatin (LIPITOR) 40 MG tablet TAKE 1 TABLET EVERY EVENING    BD INSULIN SYRINGE ULTRA-FINE 1/2 mL 30 gauge x 1/2" Syrg USE EVERY NIGHT    ciprofloxacin HCl (CIPRO) 500 MG tablet Take 1 tablet (500 mg total) by mouth 2 (two) times daily.    clopidogrel (PLAVIX) 75 mg tablet TAKE 1 TABLET ONE TIME DAILY    diclofenac sodium 1 % Gel Apply 2 g topically nightly as needed.    econazole nitrate 1 % cream Apply 1 application topically once daily. Apply to affected area    ergocalciferol (ERGOCALCIFEROL) 50,000 unit Cap Take 1 capsule (50,000 Units total) by mouth every 7 days. (Patient taking differently: Take 50,000 Units by mouth every Monday. )    glimepiride (AMARYL) 1 MG tablet Take 2 tablets (2 mg total) by mouth with lunch.    hydrochlorothiazide (HYDRODIURIL) 25 MG tablet Take 1 tablet (25 mg total) by mouth once daily.    LANTUS 100 unit/mL injection INJECT 7 TO 10 UNITS SUBCUTANEOUSLY IN THE EVENING DEPENDING ON SCALE (DISCARD EACH VIAL AFTER 28 DAYS) (Patient taking differently: 35 units)    lisinopril 10 MG tablet TAKE 1 TABLET ONE TIME DAILY    triamcinolone acetonide 0.1% (KENALOG) 0.1 % cream APPLY TOPICALLY TO " BOTH LEGS Q MONDAY AND FRIDAY     Family History     Problem Relation (Age of Onset)    Cataracts Sister, Brother    Diabetes Sister    Heart disease Brother    Leukemia Mother        Social History Main Topics    Smoking status: Former Smoker     Packs/day: 0.50     Years: 15.00     Types: Cigarettes     Quit date: 7/9/1982    Smokeless tobacco: Former User      Comment: smoked one pack per week    Alcohol use No    Drug use: No    Sexual activity: Not Currently     Review of Systems   Constitutional: Positive for chills and fatigue. Negative for diaphoresis and fever.   HENT: Negative for congestion, trouble swallowing and voice change.    Eyes: Negative for photophobia, pain and visual disturbance.   Respiratory: Negative for cough, shortness of breath and wheezing.    Cardiovascular: Negative for chest pain, palpitations and leg swelling.   Gastrointestinal: Negative for abdominal pain, nausea and vomiting.   Endocrine: Negative for cold intolerance and heat intolerance.   Genitourinary: Negative for dysuria, frequency and urgency.   Musculoskeletal: Negative for arthralgias, gait problem and myalgias.   Skin: Positive for wound. Negative for color change, pallor and rash.        Chronic wounds to lower extremities.   Neurological: Positive for weakness. Negative for dizziness and headaches.   Hematological: Does not bruise/bleed easily.   Psychiatric/Behavioral: Negative for agitation, confusion and hallucinations.     Objective:     Vital Signs (Most Recent):  Temp: (!) 93.2 °F (34 °C) (12/29/17 2148)  Pulse: 65 (12/29/17 2148)  Resp: 16 (12/29/17 2148)  BP: (!) 132/51 (12/29/17 2148)  SpO2: 98 % (12/30/17 0004) Vital Signs (24h Range):  Temp:  [93.2 °F (34 °C)-93.8 °F (34.3 °C)] 93.2 °F (34 °C)  Pulse:  [62-73] 65  Resp:  [12-16] 16  SpO2:  [98 %-100 %] 98 %  BP: (118-140)/(51-83) 132/51     Weight: (!) 137.4 kg (303 lb)  Body mass index is 47.46 kg/m².    Physical Exam   Constitutional: She is oriented  to person, place, and time. She appears well-developed and well-nourished. No distress.   HENT:   Head: Normocephalic and atraumatic.   Mouth/Throat: Oropharynx is clear and moist.   Eyes: EOM are normal. Pupils are equal, round, and reactive to light. No scleral icterus.   Neck: Normal range of motion. Neck supple. No tracheal deviation present.   Cardiovascular: Normal rate, regular rhythm and intact distal pulses.    Pulmonary/Chest: Effort normal and breath sounds normal. No respiratory distress. She has no wheezes.   Abdominal: Soft. Bowel sounds are normal. She exhibits no distension. There is no tenderness.   Musculoskeletal: Normal range of motion. She exhibits no edema or tenderness.   Neurological: She is alert and oriented to person, place, and time. She displays no tremor. GCS eye subscore is 4. GCS verbal subscore is 5. GCS motor subscore is 6.   Skin: Capillary refill takes less than 2 seconds. There is erythema. Nails show no clubbing.        Psychiatric: She has a normal mood and affect. Her speech is normal and behavior is normal.   Nursing note and vitals reviewed.        CRANIAL NERVES     CN III, IV, VI   Pupils are equal, round, and reactive to light.  Extraocular motions are normal.   Left posterior knee                 Significant Labs:   CBC:   Recent Labs  Lab 12/29/17 2030   WBC 3.54*   HGB 11.4*   HCT 35.5*        CMP:   Recent Labs  Lab 12/29/17 2030      K 4.9      CO2 24   *   BUN 29*   CREATININE 2.1*   CALCIUM 9.9   ANIONGAP 10   EGFRNONAA 23*     Lactic Acid:   Recent Labs  Lab 12/29/17 2030   LACTATE 1.2     POCT Glucose:   Recent Labs  Lab 12/29/17 2026   POCTGLUCOSE 171*     Urine Culture: No results for input(s): LABURIN in the last 48 hours.  Urine Studies:   Recent Labs  Lab 12/29/17 2048   COLORU Yellow   APPEARANCEUA Hazy*   PHUR 5.0   SPECGRAV 1.025   PROTEINUA Negative   GLUCUA 3+*   KETONESU Negative   BILIRUBINUA Negative   OCCULTUA 1+*    NITRITE Negative   UROBILINOGEN Negative   LEUKOCYTESUR 1+*   RBCUA 0   WBCUA 30*   BACTERIA Few*   SQUAMEPITHEL 0     All pertinent labs within the past 24 hours have been reviewed.    Significant Imaging: None

## 2017-12-31 VITALS
TEMPERATURE: 97 F | SYSTOLIC BLOOD PRESSURE: 163 MMHG | BODY MASS INDEX: 45.99 KG/M2 | WEIGHT: 293 LBS | HEIGHT: 67 IN | RESPIRATION RATE: 17 BRPM | DIASTOLIC BLOOD PRESSURE: 70 MMHG | HEART RATE: 71 BPM | OXYGEN SATURATION: 96 %

## 2017-12-31 PROBLEM — E83.42 HYPOMAGNESEMIA: Status: ACTIVE | Noted: 2017-12-31

## 2017-12-31 LAB
ANION GAP SERPL CALC-SCNC: 8 MMOL/L
BACTERIA UR CULT: NORMAL
BUN SERPL-MCNC: 21 MG/DL
CALCIUM SERPL-MCNC: 8.8 MG/DL
CHLORIDE SERPL-SCNC: 110 MMOL/L
CO2 SERPL-SCNC: 24 MMOL/L
CREAT SERPL-MCNC: 1.6 MG/DL
EST. GFR  (AFRICAN AMERICAN): 36 ML/MIN/1.73 M^2
EST. GFR  (NON AFRICAN AMERICAN): 32 ML/MIN/1.73 M^2
GLUCOSE SERPL-MCNC: 116 MG/DL
MAGNESIUM SERPL-MCNC: 2.1 MG/DL
PHOSPHATE SERPL-MCNC: 3.6 MG/DL
POCT GLUCOSE: 125 MG/DL (ref 70–110)
POCT GLUCOSE: 156 MG/DL (ref 70–110)
POCT GLUCOSE: 157 MG/DL (ref 70–110)
POTASSIUM SERPL-SCNC: 5.2 MMOL/L
SODIUM SERPL-SCNC: 142 MMOL/L

## 2017-12-31 PROCEDURE — G0378 HOSPITAL OBSERVATION PER HR: HCPCS

## 2017-12-31 PROCEDURE — 25000003 PHARM REV CODE 250: Performed by: HOSPITALIST

## 2017-12-31 PROCEDURE — 94761 N-INVAS EAR/PLS OXIMETRY MLT: CPT

## 2017-12-31 PROCEDURE — 83735 ASSAY OF MAGNESIUM: CPT

## 2017-12-31 PROCEDURE — 36415 COLL VENOUS BLD VENIPUNCTURE: CPT

## 2017-12-31 PROCEDURE — 97110 THERAPEUTIC EXERCISES: CPT

## 2017-12-31 PROCEDURE — 84100 ASSAY OF PHOSPHORUS: CPT

## 2017-12-31 PROCEDURE — 97530 THERAPEUTIC ACTIVITIES: CPT

## 2017-12-31 PROCEDURE — 25000003 PHARM REV CODE 250: Performed by: NURSE PRACTITIONER

## 2017-12-31 PROCEDURE — 80048 BASIC METABOLIC PNL TOTAL CA: CPT

## 2017-12-31 RX ORDER — FLUCONAZOLE 200 MG/1
200 TABLET ORAL DAILY
Qty: 7 TABLET | Refills: 0 | Status: SHIPPED | OUTPATIENT
Start: 2018-01-01 | End: 2018-01-08

## 2017-12-31 RX ORDER — FLUCONAZOLE 200 MG/1
200 TABLET ORAL DAILY
Status: DISCONTINUED | OUTPATIENT
Start: 2017-12-31 | End: 2017-12-31 | Stop reason: HOSPADM

## 2017-12-31 RX ADMIN — CLOPIDOGREL BISULFATE 75 MG: 75 TABLET ORAL at 09:12

## 2017-12-31 RX ADMIN — STANDARDIZED SENNA CONCENTRATE AND DOCUSATE SODIUM 2 TABLET: 8.6; 5 TABLET, FILM COATED ORAL at 09:12

## 2017-12-31 RX ADMIN — ASPIRIN 81 MG 81 MG: 81 TABLET ORAL at 09:12

## 2017-12-31 RX ADMIN — MICONAZOLE NITRATE: 20 CREAM TOPICAL at 09:12

## 2017-12-31 RX ADMIN — FLUCONAZOLE 200 MG: 200 TABLET ORAL at 12:12

## 2017-12-31 NOTE — PLAN OF CARE
Problem: Occupational Therapy Goal  Goal: Occupational Therapy Goal  Goals to be met by: 1/6/2017    Patient will increase functional independence with ADLs by performing:    Toileting from bedside commode with Stand-by Assistance for hygiene and clothing management.   Supine to sit with Modified [place].  Toilet transfer to bedside commode with Stand-by Assistance.    Outcome: Ongoing (interventions implemented as appropriate)  Pt. Would benefit from continued OT.  Recommended SNF.  Pt has all necessary DME at home.  Continue with OT POC.

## 2017-12-31 NOTE — ASSESSMENT & PLAN NOTE
Stage 3 CKD with due to type 2 DM  BUN/creatinine 29/2.1 up from 19/1.4 on 12/15/17. Creatinine is down to 1.7 today. Likely related to poor oral intake and cipro usage.  Avoid nephrotoxins. Stop fluids today. Repeat BMP in AM.

## 2017-12-31 NOTE — PLAN OF CARE
Problem: Physical Therapy Goal  Goal: Physical Therapy Goal  Goals to be met by: 18      Patient will increase functional independence with mobility by performin. Supine to sit with [place]  2. Sit to supine with [place]  3. Rolling to Left and right with [place].  4. Sit to stand transfer with [place] without AD  5. Bed to chair transfer with [place] without AD     Outcome: Ongoing (interventions implemented as appropriate)  Progressing fairly well /c therapy. Cont POC.

## 2017-12-31 NOTE — PROGRESS NOTES
[facility] Hospital Medicine  Progress Note    Patient Name: Sherin Ortiz  MRN: [id number]  Patient Class: OP- Observation   Admission Date: 12/29/2017  Length of Stay: 0 days  Attending Physician: Humza Glover MD  Primary Care Provider: Ame Vasquez MD        Subjective:     Principal Problem:LOREN (acute kidney injury)    HPI:  Sherin Ortiz is a 72 y.o.  woman with morbid obesity, hypertension, hyperlipidemia, diabetes mellitus type 2 (on insulin therapy) with retinopathy, neuropathy, and chronic kidney disease stage 3, nephrolithiasis, chronic venous hypertension with leg edema, bilateral tinea pedis, frequent UTIs, and chronic pyuria and bacteriuria with positive nitrite, even when she is not ill.  She has been dependent on a motorized wheelchair since her 50s.  She lives in [place] and receives her outpatient medical care at [facility].  Her primary care physician is Dr. Ame Vasquez.  Her nephrologist is Dr. Cynthia Choi.  She is followed by NP Wiliam Mccoy in Endocrinology clinic.  She is followed by NP Akosua Alvarenga in wound care clinic for bilateral leg wounds.  She gets home health for wound care.  She has a penicillin allergy but has tolerated cephalosporins.  She typically gets hypothermia and leukopenia when she has urinary tract infections (she was hospitalized at [facility] from 11/6/14-11/9/14 for evaluation of this, with no other etiology found other than a UTI), and again at [facility] 12/13/17-12/15/17 with UTI and hypothermia.  Urine cultures grew pan-sensitive E. Coli, patient was discharged home to complete cefdinir.  PT/OT were consulted and recommended SNF placement, patient declined and opted for  PT/OT with home health.    Presented to [facility] 12/29/17 with weakness, fatigue, and chills x 2 days.  Again she reported temperature as low as 93F at home.  Denies dysuria, endorses poor appetite and  oral intake.  She was prescribed ciprofloxacin 500 mg BID by her PCP on 12/26/17 for presumed UTI with no improvement in symptoms.  Upon arrival to ED patient with WBC count 3.54, temperature 93.8F, lactate 1.2, BUN 29, creatinine 2.1; U/A with 30 WBCs, few bacteria, and 1+ leukocytes.  She was given ceftriaxone and placed on Behr hugger to elevate temperature.  Admitted to Hospital Medicine's service for observation of LOREN.     Hospital Course:  No notes on file    Interval History: Feeling better. Eating more and reports eating all of her breakfast.     Review of Systems   Constitutional: Negative for chills and fever.   Respiratory: Negative for cough and shortness of breath.    Cardiovascular: Negative for chest pain and palpitations.   Gastrointestinal: Negative for nausea and vomiting.     Objective:     Vital Signs (Most Recent):  Temp: 97.4 °F (36.3 °C) (12/30/17 1606)  Pulse: 71 (12/30/17 1606)  Resp: 17 (12/30/17 1606)  BP: 124/61 (12/30/17 1606)  SpO2: 97 % (12/30/17 1557) Vital Signs (24h Range):  Temp:  [93.2 °F (34 °C)-97.4 °F (36.3 °C)] 97.4 °F (36.3 °C)  Pulse:  [59-78] 71  Resp:  [12-19] 17  SpO2:  [97 %-100 %] 97 %  BP: (118-146)/(51-83) 124/61     Weight: (!) 137.4 kg (303 lb)  Body mass index is 47.46 kg/m².    Intake/Output Summary (Last 24 hours) at 12/30/17 1712  Last data filed at 12/30/17 1009   Gross per 24 hour   Intake              650 ml   Output              255 ml   Net              395 ml      Physical Exam   Constitutional: She is oriented to person, place, and time. She appears well-developed and well-nourished. No distress.   Cardiovascular: Normal rate and regular rhythm.    No murmur heard.  Pulmonary/Chest: Effort normal and breath sounds normal. No respiratory distress. She has no wheezes.   Abdominal: Soft. Bowel sounds are normal. She exhibits no distension. There is no tenderness.   Musculoskeletal: She exhibits edema and tenderness.   BLE wrapped   Neurological: She is  alert and oriented to person, place, and time.   Skin: Skin is warm and dry.   Psychiatric: She has a normal mood and affect. Her behavior is normal.   Nursing note and vitals reviewed.      Significant Labs:   CBC:   Recent Labs  Lab 12/29/17 2030 12/30/17  0608   WBC 3.54* 2.82*   HGB 11.4* 10.0*   HCT 35.5* 30.9*    181     CMP:   Recent Labs  Lab 12/29/17 2030 12/30/17  0608    144   K 4.9 4.7    111*   CO2 24 26   * 64*   BUN 29* 25*   CREATININE 2.1* 1.7*   CALCIUM 9.9 9.2   ANIONGAP 10 7*   EGFRNONAA 23* 29*     Magnesium:   Recent Labs  Lab 12/30/17  0608   MG 1.4*     POCT Glucose:   Recent Labs  Lab 12/30/17  0437 12/30/17  1105 12/30/17  1547   POCTGLUCOSE 76 95 121*       Significant Imaging: I have reviewed all pertinent imaging results/findings within the past 24 hours.    Assessment/Plan:      * LOREN (acute kidney injury)    Stage 3 CKD with due to type 2 DM  BUN/creatinine 29/2.1 up from 19/1.4 on 12/15/17. Creatinine is down to 1.7 today. Likely related to poor oral intake and cipro usage.  Avoid nephrotoxins. Stop fluids today. Repeat BMP in AM.         Hypomagnesemia    Give mag sulfate        Bacteriuria with pyuria    Recurrent UTI  Urine gram stain and culture pending.  Continue ceftriaxone.          Chronic venous hypertension with ulcer involving both sides    Venous insufficiency of leg  Chronic.  Continue prior wound care orders (change dressings every Monday and Thursday--> last changed 12/29/170          Diabetic peripheral neuropathy associated with type 2 diabetes mellitus    Uncontrolled type 2 DM with stage 3 CKD, with long-term use of insulin  Chronic.  Holding home glimepiride while inpatient.  Takes lantus 35 units qHS.  Give levemir 20 units qHS.  Pattern glucose and adjust insulin dose as necessary.  Accuchecks with SSI.          Benign essential hypertension    Chronic.  Holding lisinopril and HCTZ due to LOREN. Continue to monitor. SBP ranged 118 to  140.         Hyperlipidemia LDL goal <100    Chronic.  Continue atorvastatin.          Tinea pedis    Chronic.  Continue antifungal cream BID.            VTE Risk Mitigation         Ordered     enoxaparin injection 30 mg  Daily     Route:  Subcutaneous        12/29/17 2323     High Risk of VTE  Once      12/29/17 2323              Humza Glover MD  Department of Hospital Medicine   [facility]

## 2017-12-31 NOTE — PT/OT/SLP PROGRESS
Physical Therapy Treatment    Patient Name:  Sherin Ortiz   MRN:  [id number]    Recommendations:     Discharge Recommendations:  nursing facility, skilled   Discharge Equipment Recommendations:     Barriers to discharge: Decreased caregiver support    Assessment:     Sherin Ortiz is a 72 y.o. female admitted with a medical diagnosis of LOREN (acute kidney injury).  She presents with the following impairments/functional limitations:  weakness, impaired endurance, impaired self care skills, impaired functional mobilty, gait instability, impaired balance, decreased lower extremity function, impaired muscle length, impaired skin.    Rehab Prognosis:  good; patient would benefit from acute skilled PT services to address these deficits and reach maximum level of function.      Recent Surgery: * No surgery found *      Plan:     During this hospitalization, patient to be seen 5 x/week to address the above listed problems via gait training, therapeutic activities, therapeutic exercises  · Plan of Care Expires:  01/30/18   Plan of Care Reviewed with: patient    Subjective     Communicated with nurse rowe prior to session.  Patient found in bed upon PT entry to room, agreeable to treatment.      Chief Complaint: i'm wet.  Patient comments/goals: none stated  Pain/Comfort:  · Pain Rating 1: 0/10    Patients cultural, spiritual, Episcopal conflicts given the current situation: na    Objective:     Patient found with: bed alarm     General Precautions: Standard, fall   Orthopedic Precautions:N/A   Braces: N/A     Functional Mobility:  · Bed Mobility:     · Rolling Left:  stand by assistance and /c siderail  · Rolling Right: stand by assistance /c siderail  · Scooting: stand by assistance  · Supine to Sit: stand by assistance and /c siderail  · Transfers:     · Bed to Chair: contact guard assistance with  no AD  using  Stand Pivot and Step Transfer        AM-PAC 6 CLICK MOBILITY  Turning over in bed (including adjusting  bedclothes, sheets and blankets)?: 3  Sitting down on and standing up from a chair with arms (e.g., wheelchair, bedside commode, etc.): 3  Moving from lying on back to sitting on the side of the bed?: 3  Moving to and from a bed to a chair (including a wheelchair)?: 3  Need to walk in hospital room?: 1  Climbing 3-5 steps with a railing?: 1  Total Score: 14       Therapeutic Activities and Exercises:  Rolled each way /c SBA /c side rail x few trials for hygiene mgmt. Stand-step/pivot t/f from EOB>w/c /c chair positioned directly at pt. States she normally t/f like this at home to her power w/c. Pt performed t/f safely. BLE's seated therex in w/c /c AROM x15 reps for strengthening: Ap, LAQ, knee flex, hip abd/add, marches. Tolerated well.    Patient left up in chair with call button in reach and nurse notified..    GOALS:    Physical Therapy Goals        Problem: Physical Therapy Goal    Goal Priority Disciplines Outcome Goal Variances Interventions   Physical Therapy Goal     PT/OT, PT Ongoing (interventions implemented as appropriate)     Description:  Goals to be met by: 18      Patient will increase functional independence with mobility by performin. Supine to sit with [place]  2. Sit to supine with [place]  3. Rolling to Left and right with [place].  4. Sit to stand transfer with [place] without AD  5. Bed to chair transfer with [place] without AD                      Time Tracking:     PT Received On: 17  PT Start Time: 1000     PT Stop Time: 1024  PT Total Time (min): 24 min     Billable Minutes: Therapeutic Activity 15 and Therapeutic Exercise 9    Treatment Type: Treatment  PT/PTA: PTA     PTA Visit Number: 1     Victoria Neil, PTA  2017

## 2017-12-31 NOTE — PT/OT/SLP EVAL
Occupational Therapy   Evaluation    Name: Sherin Ortiz  MRN: [id number]  Admitting Diagnosis:  LOREN (acute kidney injury)      Recommendations:     Discharge Recommendations: nursing facility, skilled  Discharge Equipment Recommendations:  none  Barriers to discharge:  Decreased caregiver support    History:     Occupational Profile:  Living Environment: [facility] with no steps with bed bound sister; has walk n shower with grab bars and 3n1 frame in side for sitting; has raised toilet and grab bars next to toilet  Previous level of function: ADLS performed at [facility] -indep level from power wc; wc bound for mobility; only does stand pivot t/f to bed, toilet; uses leg  to get legs into bed at home. 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    Benign essential hypertension 11/14/2012    Cataract     s/p phacoemulsification    Chronic kidney disease (CKD), stage III (moderate) 9/12/2013    Diabetic peripheral neuropathy associated with type 2 diabetes mellitus 11/14/2014    causing right hemiparesis    Gait disorder     Hyperlipidemia     Iritis - Both Eyes 6/10/2013    Kidney stone     Lymphedema     Morbid obesity with BMI of 40.0-44.9, adult 2/18/2015    Nephrolithiasis 4/20/2016    NS (nuclear sclerosis) 4/1/2013    Nuclear sclerosis - Both Eyes 4/29/2013    Preseptal cellulitis - Right Eye 4/29/2013    Proliferative diabetic retinopathy - Both Eyes 4/29/2013    Proliferative diabetic retinopathy, both eyes 4/1/2013    PSC (posterior subcapsular cataract) - Both Eyes 4/29/2013    S/P hernia repair  12/19/2012    TIA (transient ischemic attack) 11/18/2014    Tinea pedis 7/24/2012    Tinea pedis is present on both feet.    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 Kat Bower       Subjective     Chief Complaint: none  Patient/Family stated goals: home  Communicated with: nurse prior to session.  Pain/Comfort:  · Pain Rating 1: 0/10  · Pain Rating Post-Intervention 1: 0/10    Objective:     Patient found with: bed alarm, peripheral IV    General Precautions: Standard, fall   Orthopedic Precautions:N/A   Braces: N/A     Occupational Performance:    Bed Mobility:    · Patient completed Rolling/Turning to Left with  modified independence and with side rail  · Patient completed Scooting/Bridging with modified independence and with side rail  · Patient completed Supine to Sit with stand by assistance and with side rail  · Patient completed Sit to Supine with moderate assistance and with  side rail for BLE into bed    Functional Mobility/Transfers:  · Patient completed Sit <> Stand Transfer with contact guard assistance  with  bed rail and RW     Activities of Daily Living:  · Feeding:  independence bed level  · Grooming: independence be dlevel  · LB Dressing: total assistance B shoe covers; has BLE coban wrap  · Toileting: maximal assistance incontinent urine; wearing diaper in bed; stood for  clean up CGA standing; max assist to remove; pt sat to don diaper     Cognitive/Visual Perceptual:  Cognitive/Psychosocial Skills:     -       Oriented to: Person, Place, Time and Situation   -       Follows Commands/attention:Follows multistep  commands  -       Communication: clear/fluent  -       Memory: No Deficits noted  -       Safety awareness/insight to disability: intact   -       Mood/Affect/Coping skills/emotional control: Appropriate to situation  Visual/Perceptual:      -Intact    Physical Exam:  Balance:    -       static sitting;  Good  Dynamic sitting:  Fair plus  Static standing: poor  Postural examination/scapula alignment:    -       Rounded shoulders  Skin integrity: Visible skin intact  Edema:  Mild BLE  Sensation:    -       Intact  Dominant hand:    -       right  Upper Extremity Range of Motion:     -       Right Upper Extremity: WFL  -       Left Upper Extremity: WFL  Upper Extremity Strength:    -       Right Upper Extremity: WFL  -       Left Upper Extremity: WFL   Strength:    -       Right Upper Extremity: WFL  -       Left Upper Extremity: WFL    Patient left left sidelying with all lines intact, call button in reach, bed alarm on and nurse notified    AMPA 6 Click:  AMPA Total Score: 16    Treatment & Education:  Role fo OT And POC  Education:    Assessment:     Sherin Ortiz is a 72 y.o. female with a medical diagnosis of LOREN (acute kidney injury).  She presents with weakness and decreased mobility.   Recommended SNF.  Pt has all necessary DME at home.  Continue with  "OT POC.  Performance deficits affecting function are weakness, impaired functional mobilty, gait instability, impaired endurance, impaired balance, decreased lower extremity function, impaired self care skills, impaired skin.      Rehab Prognosis:  Good; patient would benefit from acute skilled OT services to address these deficits and reach maximum level of function.         Clinical Decision Makin.  OT Mod:  "Pt evaluation falls under moderate complexity for evaluation coding due to identification of 3-5 performance deficits noted as stated above. Eval required Min/Mod assistance to complete on this date and detailed assessment(s) were utilized. Moreover, an expanded review of history and occupational profile obtained with additional review of cognitive, physical and psychosocial hx."     Plan:     Patient to be seen 5 x/week to address the above listed problems via self-care/home management, therapeutic activities, therapeutic exercises  · Plan of Care Expires: 18  · Plan of Care Reviewed with: patient    This Plan of care has been discussed with the patient who was involved in its development and understands and is in agreement with the identified goals and treatment plan    GOALS:    Occupational Therapy Goals        Problem: Occupational Therapy Goal    Goal Priority Disciplines Outcome Interventions   Occupational Therapy Goal     OT, PT/OT Ongoing (interventions implemented as appropriate)    Description:  Goals to be met by: 2017    Patient will increase functional independence with ADLs by performing:    Toileting from bedside commode with Stand-by Assistance for hygiene and clothing management.   Supine to sit with Modified [place].  Toilet transfer to bedside commode with Stand-by Assistance.                      Time Tracking:     OT Date of Treatment: 17  OT Start Time: 1633  OT Stop Time: 1647  OT Total Time (min): 14 min    Billable Minutes:Evaluation 14  Total Time " 14    Nickie Lang, OT  12/30/2017

## 2017-12-31 NOTE — PLAN OF CARE
"  Chief Complaint   Patient presents with    Low temperature       Recently discharged from this facility after admission for UTI. On oral Cipro since Tuesday. Presents with c/o having low temperature at home (93-94 oral) for past 2 days. States does not feel bad, just feels cold. Presents in no distress.      Pt was last admitted to [facility] 12/13-12/15/17, at that time refused SNF placement, sent home with [facility]    Pt needs assist and uses assistive equipment, lives with sister, has supportive daughters who will provide transportation on discharge.     Pt states that she has private sitters that stay with her "night and day" and does not need any other help    Pt has a motorized scooter, shower Chair, Grab bars       12/31/17 8097   Discharge Assessment   Assessment Type Discharge Planning Assessment   Confirmed/corrected address and phone number on facesheet? Yes   Assessment information obtained from? Patient   Expected Length of Stay (days) 2   Communicated expected length of stay with patient/caregiver yes   Prior to hospitilization cognitive status: Alert/Oriented   Prior to hospitalization functional status: Assistive Equipment;Needs Assistance   Current cognitive status: Alert/Oriented   Current Functional Status: Assistive Equipment;Needs Assistance   Facility Arrived From: (home)   Lives With sibling(s)  (sister)   Able to Return to Prior Arrangements yes   Is patient able to care for self after discharge? No   Who are your caregiver(s) and their phone number(s)? daughters:  Gumaro Santo [phone] and Hayley Velasco [phone]   Patient's perception of discharge disposition home health  (Currently has [facility])   Readmission Within The Last 30 Days no previous admission in last 30 days   Patient currently being followed by outpatient case management? No   Patient currently receives any other outside agency services? No   Equipment Currently Used at Home power chair;3-in-1 commode;grab " bar;raised toilet;shower chair   Do you have any problems affording any of your prescribed medications? No   Is the patient taking medications as prescribed? yes   Does the patient have transportation home? Yes   Transportation Available family or friend will provide   Dialysis Name and Scheduled days N/A   Does the patient receive services at the [facility]? No   Discharge Plan A Home Health   Discharge Plan B Home with family   Patient/Family In Agreement With Plan yes     Xochitl Carreon RN Transitional Navigator  [phone]

## 2017-12-31 NOTE — PLAN OF CARE
Problem: Patient Care Overview  Goal: Plan of Care Review  Outcome: Ongoing (interventions implemented as appropriate)  Pt in NAD. AAOx4. V/s stable. Scheduled medications given per MAR. PIV saline locked dressing CDI. Pt has excoriation under breast and panniculus barrier past applied. Pt has a rash to L inner thigh. Pt has rash to L posterior knee scheduled medication applied. Pt has dressing from home health to bilat feet, toes are swollen medication applied per MD order. Cap refill <3 bilat feet pt reports being unable to feel sensation in feet. Blood glucose monitoring continued. Continuous cardiac monitoring in place. Pt weight shift herself. Encouraged to call for assistance verbalized understanding. Safety maintained bed in low locked position, SR up X2, bed alarm on, and call bell in reach. Will continue to monitor.

## 2017-12-31 NOTE — PLAN OF CARE
[place] sent HH orders to [facility], [phone], fax [phone].  nurse will be out to eval pt tomorrow 1/1/18    No DME ordered    Future Appointments  Date Time Provider Department Center   1/5/2018 2:00 PM Akosua Alvarenga NP [facility] WOUND [facility]   1/9/2018 11:30 AM Ame Vasquez MD [facility] IM [facility]       Pt's nurse will go over medications/signs and symptoms prior to discharge       12/31/17 1400   Final Note   Assessment Type Final Discharge Note   Discharge Disposition Home-Health  ([facility])   What phone number can be called within the next 1-3 days to see how you are doing after discharge? [phone]   Hospital Follow Up  Appt(s) scheduled? No  (offices closed, [place] to follow up)   Right [facility] Referral Info   Post Acute Recommendation Home-care  ([facility])     Xochitl Carreon, RN Transitional Navigator  [phone]

## 2017-12-31 NOTE — PLAN OF CARE
[facility] HOME HEALTH ORDERS  FACE TO FACE ENCOUNTER    Patient Name: Sherin Ortiz  YOB: 1945    PCP: Ame Vasquez MD   PCP Address: [address] / [place] [address]  PCP Phone Number: [phone]  PCP Fax: [phone]    Encounter Date: 12/31/2017    Admit to Home Health    Diagnoses:  Active Hospital Problems    Diagnosis  POA    *LOREN (acute kidney injury) [N17.9]  Yes    Hypomagnesemia [E83.42]  No    Recurrent UTI [N39.0]  Yes    Bacteriuria with pyuria [N39.0]  Yes     Chronic    Uncontrolled type 2 diabetes mellitus with stage 3 chronic kidney disease, with long-term current use of insulin [E11.22, E11.65, N18.3, Z79.4]  Not Applicable     Chronic    Chronic venous hypertension with ulcer involving both sides [I87.313]  Yes     Chronic    Venous insufficiency of leg [I87.2]  Yes     Chronic    Wheelchair dependent [Z99.3]  Not Applicable     Chronic    Diabetic peripheral neuropathy associated with type 2 diabetes mellitus [E11.42]  Yes     Chronic    Morbid obesity with BMI of 40.0-44.9, adult [E66.01, Z68.41]  Not Applicable    Stage 3 chronic kidney disease due to type 2 diabetes mellitus [E11.22, N18.3]  Yes     Chronic    Benign essential hypertension [I10]  Yes     Chronic    Hyperlipidemia LDL goal <100 [E78.5]  Yes     Chronic    Tinea pedis [B35.3]  Yes     Chronic     Tinea pedis is present on both feet.        Resolved Hospital Problems    Diagnosis Date Resolved POA    Hypothermia [T68.XXXA] 12/31/2017 Yes       Future Appointments  Date Time Provider Department Center   1/5/2018 2:00 PM Akosua Alvarenga NP [facility] WOUND [facility]   1/9/2018 11:30 AM Ame Vasquez MD [facility] IM [facility] PC[facility]     Follow-up Information     Ame Vasquez MD. Schedule an appointment as soon as possible for a visit in 3 weeks.    Specialty:  Internal Medicine  Contact information:  [address]  [place] [address]  [phone]              "        I have seen and examined this patient face to face today. My clinical findings that support the need for the home health skilled services and home bound status are the following:  Medical restrictions requiring assistance of another human to leave home due to  Wound care needs.    Allergies:  Review of patient's allergies indicates:   Allergen Reactions    Penicillins Hives     Other reaction(s): Hives    Sulfa (sulfonamide antibiotics) Other (See Comments)     Shakes, pt states her doctor told her the shakes were possibly caused by an allergy to sulfa       Diet: cardiac diet and diabetic diet: 2000 calorie    Activities: activity as tolerated    Nursing:   SN to complete comprehensive assessment including routine vital signs. Instruct on disease process and s/s of complications to report to MD. Review/verify medication list sent home with the patient at time of discharge  and instruct patient/caregiver as needed. Frequency may be adjusted depending on start of care date.    Notify MD if SBP > 160 or < 90; DBP > 90 or < 50; HR > 120 or < 50; Temp > 101; Other:         MISCELLANEOUS CARE:  Wound Care Orders:  Resume prior wound care orders      Medications: Review discharge medications with patient and family and provide education.      Current Discharge Medication List      START taking these medications    Details   fluconazole (DIFLUCAN) 200 MG Tab Take 1 tablet (200 mg total) by mouth once daily.  Qty: 7 tablet, Refills: 0         CONTINUE these medications which have NOT CHANGED    Details   ACCU-CHEK RAMIRO PLUS METER Misc       ACCU-CHEK RAMIRO PLUS TEST STRP Strp TEST TWICE DAILY  Qty: 200 strip, Refills: 3      ACCU-CHEK SOFTCLIX LANCETS Misc       aspirin 81 MG Chew Take 81 mg by mouth once daily.        atorvastatin (LIPITOR) 40 MG tablet TAKE 1 TABLET EVERY EVENING  Qty: 90 tablet, Refills: 3      BD INSULIN SYRINGE ULTRA-FINE 1/2 mL 30 gauge x 1/2" Syrg USE EVERY NIGHT  Qty: 90 each, Refills: 3 "      clopidogrel (PLAVIX) 75 mg tablet TAKE 1 TABLET ONE TIME DAILY  Qty: 90 tablet, Refills: 3      diclofenac sodium 1 % Gel Apply 2 g topically nightly as needed.  Qty: 1 Tube, Refills: 0    Associated Diagnoses: Stage 3 chronic kidney disease due to type 2 diabetes mellitus; Uncontrolled type 2 diabetes mellitus with stage 3 chronic kidney disease, with long-term current use of insulin; Right shoulder pain, unspecified chronicity      econazole nitrate 1 % cream Apply 1 application topically once daily. Apply to affected area  Qty: 85 g, Refills: 2    Associated Diagnoses: Tinea pedis of both feet      ergocalciferol (ERGOCALCIFEROL) 50,000 unit Cap Take 1 capsule (50,000 Units total) by mouth every 7 days.  Qty: 12 capsule, Refills: 3      glimepiride (AMARYL) 1 MG tablet Take 2 tablets (2 mg total) by mouth with lunch.  Qty: 180 tablet, Refills: 3      hydrochlorothiazide (HYDRODIURIL) 25 MG tablet Take 1 tablet (25 mg total) by mouth once daily.  Qty: 30 tablet, Refills: 3      LANTUS 100 unit/mL injection INJECT 7 TO 10 UNITS SUBCUTANEOUSLY IN THE EVENING DEPENDING ON SCALE (DISCARD EACH VIAL AFTER 28 DAYS)  Qty: 30 mL, Refills: 3      triamcinolone acetonide 0.1% (KENALOG) 0.1 % cream APPLY TOPICALLY TO BOTH LEGS Q MONDAY AND FRIDAY  Refills: 1         STOP taking these medications       ciprofloxacin HCl (CIPRO) 500 MG tablet Comments:   Reason for Stopping:         lisinopril 10 MG tablet Comments:   Reason for Stopping:               I certify that this patient is confined to her home and needs intermittent skilled nursing care.      Humza Glover MD, [facility]  Staff Hospitalist  Pager: [phone]  Spectralink: [phone]

## 2017-12-31 NOTE — PROGRESS NOTES
Patient discharged per MD orders. Patient verbalized understanding of discharged iinstructions. PIV discontinued per MD orders. Catheter tip intact and pressure dressing applied. Telemetry discontinued per MD orders. Patient wheeled down to main lobby per transport

## 2017-12-31 NOTE — PROGRESS NOTES
1:56pm, Tanya returned call with [facility], Her [facility] is not working, [place] faxed orders to [phone]    TN sent HH orders to [facility] via [facility], [phone] awaiting on call to call back

## 2018-01-04 LAB
BACTERIA BLD CULT: NORMAL
BACTERIA BLD CULT: NORMAL

## 2018-01-05 ENCOUNTER — OFFICE VISIT (OUTPATIENT)
Dept: WOUND CARE | Facility: CLINIC | Age: 73
End: 2018-01-05
Payer: MEDICARE

## 2018-01-05 VITALS
SYSTOLIC BLOOD PRESSURE: 157 MMHG | WEIGHT: 293 LBS | BODY MASS INDEX: 49.02 KG/M2 | HEART RATE: 78 BPM | DIASTOLIC BLOOD PRESSURE: 82 MMHG

## 2018-01-05 DIAGNOSIS — L97.522 SKIN ULCER OF TOE OF LEFT FOOT WITH FAT LAYER EXPOSED: Primary | ICD-10-CM

## 2018-01-05 DIAGNOSIS — R60.0 BILATERAL LEG EDEMA: ICD-10-CM

## 2018-01-05 DIAGNOSIS — L97.512 SKIN ULCER OF TOE OF RIGHT FOOT WITH FAT LAYER EXPOSED: ICD-10-CM

## 2018-01-05 DIAGNOSIS — L97.921 ULCER OF LEFT LOWER EXTREMITY, LIMITED TO BREAKDOWN OF SKIN: ICD-10-CM

## 2018-01-05 DIAGNOSIS — L97.912 ULCER OF RIGHT LOWER EXTREMITY WITH FAT LAYER EXPOSED: ICD-10-CM

## 2018-01-05 DIAGNOSIS — I87.2 VENOUS INSUFFICIENCY OF BOTH LOWER EXTREMITIES: Chronic | ICD-10-CM

## 2018-01-05 PROBLEM — R82.81 BACTERIURIA WITH PYURIA: Chronic | Status: RESOLVED | Noted: 2017-12-13 | Resolved: 2018-01-05

## 2018-01-05 PROBLEM — R82.71 BACTERIURIA WITH PYURIA: Chronic | Status: RESOLVED | Noted: 2017-12-13 | Resolved: 2018-01-05

## 2018-01-05 PROCEDURE — 99999 PR PBB SHADOW E&M-EST. PATIENT-LVL IV: CPT | Mod: PBBFAC,,, | Performed by: NURSE PRACTITIONER

## 2018-01-05 PROCEDURE — 29580 STRAPPING UNNA BOOT: CPT | Mod: 50,S$GLB,, | Performed by: NURSE PRACTITIONER

## 2018-01-05 PROCEDURE — 99499 UNLISTED E&M SERVICE: CPT | Mod: S$GLB,,, | Performed by: NURSE PRACTITIONER

## 2018-01-05 NOTE — PROGRESS NOTES
Subjective:       Patient ID: Sherin Ortiz is a 73 y.o. female.    Chief Complaint: Wound Check    Wound Check     Wound Check:   This patient is seen today for reevaluation of wounds to bilateral lower legs and the right second toe. An unna boot was on the legs but she removed it before coming to clinic today to shower. The wounds are slowly healing as evidenced by wound contracture.  Problems that interfere with wound healing include multiple co-morbidities, diabetes, edema, diabetic neuropathy and  morbid obesity. The patient is afebrile. The patient ambulates with great difficulty secondary to her body habitus.  She has no pain.  She denies increased swelling, redness or purulent drainage.   She is afebrile.   Review of Systems   Constitutional: Negative for chills, diaphoresis and fever.   HENT: Negative for hearing loss, postnasal drip, rhinorrhea, sinus pressure, sneezing, sore throat, tinnitus and trouble swallowing.    Eyes: Negative for visual disturbance.   Respiratory: Negative for apnea, cough, shortness of breath and wheezing.    Cardiovascular: Positive for leg swelling. Negative for chest pain and palpitations.   Gastrointestinal: Negative for constipation, diarrhea, nausea and vomiting.   Genitourinary: Negative for difficulty urinating, dysuria, frequency and hematuria.   Musculoskeletal: Negative for arthralgias, back pain and joint swelling.   Skin: Positive for wound.   Neurological: Negative for dizziness, weakness, light-headedness and headaches.   Hematological: Does not bruise/bleed easily.   Psychiatric/Behavioral: Negative for confusion, decreased concentration, dysphoric mood and sleep disturbance. The patient is not nervous/anxious.    All other systems reviewed and are negative.      Objective:      Physical Exam   Constitutional: She is oriented to person, place, and time. No distress.   Morbidly obese   HENT:   Head: Normocephalic and atraumatic.   Neck: Normal range of motion.  Neck supple.   Pulmonary/Chest: Effort normal.   Musculoskeletal: Normal range of motion. She exhibits edema. She exhibits no tenderness.        Legs:       Feet:    Neurological: She is alert and oriented to person, place, and time.   Skin: Skin is warm and dry. Rash (dermatitis and tinea bilateral lower extremities) noted. She is not diaphoretic. No cyanosis or erythema. No pallor. Nails show no clubbing.   Psychiatric: She has a normal mood and affect. Her behavior is normal. Judgment and thought content normal.   Nursing note and vitals reviewed.    ..  Hemoglobin A1C   Date Value Ref Range Status   10/09/2017 9.9 (H) 4.0 - 5.6 % Final     Comment:     According to ADA guidelines, hemoglobin A1c <7.0% represents  optimal control in non-pregnant diabetic patients. Different  metrics may apply to specific patient populations.   Standards of Medical Care in Diabetes-2016.  For the purpose of screening for the presence of diabetes:  <5.7%     Consistent with the absence of diabetes  5.7-6.4%  Consistent with increasing risk for diabetes   (prediabetes)  >or=6.5%  Consistent with diabetes  Currently, no consensus exists for use of hemoglobin A1c  for diagnosis of diabetes for children.  This Hemoglobin A1c assay has significant interference with fetal   hemoglobin   (HbF). The results are invalid for patients with abnormal amounts of   HbF,   including those with known Hereditary Persistence   of Fetal Hemoglobin. Heterozygous hemoglobin variants (HbAS, HbAC,   HbAD, HbAE, HbA2) do not significantly interfere with this assay;   however, presence of multiple variants in a sample may impact the %   interference.     07/06/2017 10.0 (H) 4.0 - 5.6 % Final     Comment:     According to ADA guidelines, hemoglobin A1c <7.0% represents  optimal control in non-pregnant diabetic patients. Different  metrics may apply to specific patient populations.   Standards of Medical Care in Diabetes-2016.  For the purpose of screening  for the presence of diabetes:  <5.7%     Consistent with the absence of diabetes  5.7-6.4%  Consistent with increasing risk for diabetes   (prediabetes)  >or=6.5%  Consistent with diabetes  Currently, no consensus exists for use of hemoglobin A1c  for diagnosis of diabetes for children.  This Hemoglobin A1c assay has significant interference with fetal   hemoglobin   (HbF). The results are invalid for patients with abnormal amounts of   HbF,   including those with known Hereditary Persistence   of Fetal Hemoglobin. Heterozygous hemoglobin variants (HbAS, HbAC,   HbAD, HbAE, HbA2) do not significantly interfere with this assay;   however, presence of multiple variants in a sample may impact the %   interference.     03/08/2017 9.6 (H) 4.5 - 6.2 % Final     Comment:     According to ADA guidelines, hemoglobin A1C <7.0% represents  optimal control in non-pregnant diabetic patients.  Different  metrics may apply to specific populations.   Standards of Medical Care in Diabetes - 2016.  For the purpose of screening for the presence of diabetes:  <5.7%     Consistent with the absence of diabetes  5.7-6.4%  Consistent with increasing risk for diabetes   (prediabetes)  >or=6.5%  Consistent with diabetes  Currently no consensus exists for use of hemoglobin A1C  for diagnosis of diabetes for children.       ..  Lab Results   Component Value Date    ALBUMIN 2.8 (L) 12/13/2017     Sherin was seen in the clinic room and placed in the supine position on the treatment table.  Both lower legs were cleansed with Easi-clense sponges and dried thoroughly.  A mepilex foam dressing was applied to the bilateral leg wounds.  Eucerin cream was applied to the lower legs.  The ankles were padded with ABD pads.  The patient's feet was positioned at a 90 degree angle.  A zinc oxide wrap, followed by kerlix roll gauze and coban were applied using a spiral technique avoiding creases or folds.  The wraps were started behind the first metatarsal and  ended below the tibial tubercle of the knee.  There was overlap of each turn half the width of the previous turn.  The compression wraps will be changed every 3-4 days.      Assessment:       1. Skin ulcer of toe of left foot with fat layer exposed    2. Ulcer of left lower extremity, limited to breakdown of skin    3. Ulcer of right lower extremity with fat layer exposed    4. Skin ulcer of toe of right foot with fat layer exposed    5. Venous insufficiency of both lower extremities    6. Chronic venous hypertension with ulcer involving both sides    7. Bilateral leg edema        Plan:           Spectazole cream to feet and toes.  Triamcinolone cream to lower legs.  Unna boot bilateral lower legs as detailed above.  Patient was warned not to get the dressings wet and to use cast covers for showering.  Should the dressing become wet, she is to remove it, place a wet-to-dry dressing over the wound, cover with gauze and roll gauze and use ace wraps for compression and to secure bandages.  She should then notify home health as soon as possible to have a new dressing applied.  Apply medihoney gel and hydrofiber dressing to right second toe ulcer, cotton in between toes, cover with gauze and secure with roll gauze.  Change dressing twice weekly.  Return to clinic in 2 weeks.  [facility] notified of orders via email.      Home Health Wound Care Orders.  Cleanse wounds with wound .  Spectazole cream to feet and toes.  Triamcinolone cream to lower legs.  Mepilex foam dressing to bilateral leg wounds.  ABD pad to both ankles and right shin.  Unna boot (zinc oxide, kerlix and coban) bilateral lower legs.  Medihoney gel to left second toe ulcer and cover with hydrofiber, cotton in between toes, cover with gauze and secure with roll gauze.  Change toe dressing twice weekly.  Change leg wraps twice weekly.    Right lateral leg      Left medial leg      Left shin      Right second toe       Left second  toe

## 2018-01-06 ENCOUNTER — HOSPITAL ENCOUNTER (INPATIENT)
Facility: HOSPITAL | Age: 73
LOS: 2 days | Discharge: HOME HEALTH | DRG: 872 | End: 2018-01-08
Attending: EMERGENCY MEDICINE | Admitting: HOSPITALIST
Payer: MEDICARE

## 2018-01-06 DIAGNOSIS — T68.XXXA HYPOTHERMIA, INITIAL ENCOUNTER: ICD-10-CM

## 2018-01-06 DIAGNOSIS — N39.0 URINARY TRACT INFECTION WITH HEMATURIA, SITE UNSPECIFIED: Primary | ICD-10-CM

## 2018-01-06 DIAGNOSIS — N30.01 ACUTE CYSTITIS WITH HEMATURIA: ICD-10-CM

## 2018-01-06 DIAGNOSIS — R31.9 URINARY TRACT INFECTION WITH HEMATURIA, SITE UNSPECIFIED: Primary | ICD-10-CM

## 2018-01-06 LAB
ALBUMIN SERPL BCP-MCNC: 2.6 G/DL
ALLENS TEST: ABNORMAL
ALP SERPL-CCNC: 148 U/L
ALT SERPL W/O P-5'-P-CCNC: 26 U/L
AMPHET+METHAMPHET UR QL: NEGATIVE
ANION GAP SERPL CALC-SCNC: 8 MMOL/L
APTT BLDCRRT: 24.3 SEC
AST SERPL-CCNC: 24 U/L
BACTERIA #/AREA URNS AUTO: ABNORMAL /HPF
BARBITURATES UR QL SCN>200 NG/ML: NEGATIVE
BASOPHILS # BLD AUTO: 0.02 K/UL
BASOPHILS NFR BLD: 0.5 %
BENZODIAZ UR QL SCN>200 NG/ML: NEGATIVE
BILIRUB SERPL-MCNC: 0.4 MG/DL
BILIRUB UR QL STRIP: NEGATIVE
BNP SERPL-MCNC: 130 PG/ML
BUN SERPL-MCNC: 27 MG/DL
BZE UR QL SCN: NEGATIVE
CALCIUM SERPL-MCNC: 9.7 MG/DL
CANNABINOIDS UR QL SCN: NEGATIVE
CHLORIDE SERPL-SCNC: 107 MMOL/L
CLARITY UR REFRACT.AUTO: ABNORMAL
CO2 SERPL-SCNC: 26 MMOL/L
COLOR UR AUTO: YELLOW
CORTIS SERPL-MCNC: 10.5 UG/DL
CREAT SERPL-MCNC: 1.6 MG/DL
CREAT UR-MCNC: 150 MG/DL
DIFFERENTIAL METHOD: ABNORMAL
EOSINOPHIL # BLD AUTO: 0 K/UL
EOSINOPHIL NFR BLD: 0.5 %
ERYTHROCYTE [DISTWIDTH] IN BLOOD BY AUTOMATED COUNT: 14.9 %
EST. GFR  (AFRICAN AMERICAN): 36.1 ML/MIN/1.73 M^2
EST. GFR  (NON AFRICAN AMERICAN): 31.3 ML/MIN/1.73 M^2
FLUAV AG SPEC QL IA: NEGATIVE
FLUBV AG SPEC QL IA: NEGATIVE
GLUCOSE SERPL-MCNC: 152 MG/DL
GLUCOSE UR QL STRIP: ABNORMAL
HCO3 UR-SCNC: 27.2 MMOL/L (ref 24–28)
HCT VFR BLD AUTO: 34.1 %
HGB BLD-MCNC: 11.1 G/DL
HGB UR QL STRIP: ABNORMAL
HYALINE CASTS UR QL AUTO: 25 /LPF
IMM GRANULOCYTES # BLD AUTO: 0.01 K/UL
IMM GRANULOCYTES NFR BLD AUTO: 0.2 %
INR PPP: 1
KETONES UR QL STRIP: NEGATIVE
LACTATE SERPL-SCNC: 1.1 MMOL/L
LDH SERPL L TO P-CCNC: 1.04 MMOL/L (ref 0.5–2.2)
LEUKOCYTE ESTERASE UR QL STRIP: ABNORMAL
LIPASE SERPL-CCNC: 35 U/L
LYMPHOCYTES # BLD AUTO: 0.9 K/UL
LYMPHOCYTES NFR BLD: 22.1 %
MAGNESIUM SERPL-MCNC: 1.6 MG/DL
MCH RBC QN AUTO: 29.4 PG
MCHC RBC AUTO-ENTMCNC: 32.6 G/DL
MCV RBC AUTO: 90 FL
METHADONE UR QL SCN>300 NG/ML: NEGATIVE
MICROSCOPIC COMMENT: ABNORMAL
MONOCYTES # BLD AUTO: 0.3 K/UL
MONOCYTES NFR BLD: 7.8 %
NEUTROPHILS # BLD AUTO: 2.8 K/UL
NEUTROPHILS NFR BLD: 68.9 %
NITRITE UR QL STRIP: NEGATIVE
NRBC BLD-RTO: 0 /100 WBC
OPIATES UR QL SCN: NEGATIVE
PCO2 BLDA: 46 MMHG (ref 35–45)
PCP UR QL SCN>25 NG/ML: NEGATIVE
PH SMN: 7.38 [PH] (ref 7.35–7.45)
PH UR STRIP: 5 [PH] (ref 5–8)
PHOSPHATE SERPL-MCNC: 2.9 MG/DL
PLATELET # BLD AUTO: 184 K/UL
PMV BLD AUTO: 10 FL
PO2 BLDA: 34 MMHG (ref 40–60)
POC BE: 2 MMOL/L
POC SATURATED O2: 64 % (ref 95–100)
POC TCO2: 29 MMOL/L (ref 24–29)
POTASSIUM SERPL-SCNC: 4.9 MMOL/L
PROCALCITONIN SERPL IA-MCNC: 0.03 NG/ML
PROT SERPL-MCNC: 7.2 G/DL
PROT UR QL STRIP: NEGATIVE
PROTHROMBIN TIME: 10.3 SEC
RBC # BLD AUTO: 3.78 M/UL
RBC #/AREA URNS AUTO: 3 /HPF (ref 0–4)
SAMPLE: ABNORMAL
SITE: ABNORMAL
SODIUM SERPL-SCNC: 141 MMOL/L
SP GR UR STRIP: 1.01 (ref 1–1.03)
SPECIMEN SOURCE: NORMAL
SQUAMOUS #/AREA URNS AUTO: 1 /HPF
TOXICOLOGY INFORMATION: NORMAL
TSH SERPL DL<=0.005 MIU/L-ACNC: 1.73 UIU/ML
URN SPEC COLLECT METH UR: ABNORMAL
UROBILINOGEN UR STRIP-ACNC: NEGATIVE EU/DL
WBC # BLD AUTO: 4.12 K/UL
WBC #/AREA URNS AUTO: 32 /HPF (ref 0–5)
WBC CLUMPS UR QL AUTO: ABNORMAL
YEAST UR QL AUTO: ABNORMAL

## 2018-01-06 PROCEDURE — 96361 HYDRATE IV INFUSION ADD-ON: CPT

## 2018-01-06 PROCEDURE — 99285 EMERGENCY DEPT VISIT HI MDM: CPT | Mod: ,,, | Performed by: PHYSICIAN ASSISTANT

## 2018-01-06 PROCEDURE — 96366 THER/PROPH/DIAG IV INF ADDON: CPT

## 2018-01-06 PROCEDURE — 82803 BLOOD GASES ANY COMBINATION: CPT

## 2018-01-06 PROCEDURE — 87086 URINE CULTURE/COLONY COUNT: CPT

## 2018-01-06 PROCEDURE — 80307 DRUG TEST PRSMV CHEM ANLYZR: CPT

## 2018-01-06 PROCEDURE — 83880 ASSAY OF NATRIURETIC PEPTIDE: CPT

## 2018-01-06 PROCEDURE — 12000002 HC ACUTE/MED SURGE SEMI-PRIVATE ROOM

## 2018-01-06 PROCEDURE — 84145 PROCALCITONIN (PCT): CPT

## 2018-01-06 PROCEDURE — 93010 ELECTROCARDIOGRAM REPORT: CPT | Mod: ,,, | Performed by: INTERNAL MEDICINE

## 2018-01-06 PROCEDURE — 87205 SMEAR GRAM STAIN: CPT

## 2018-01-06 PROCEDURE — 83690 ASSAY OF LIPASE: CPT

## 2018-01-06 PROCEDURE — 85730 THROMBOPLASTIN TIME PARTIAL: CPT

## 2018-01-06 PROCEDURE — 87400 INFLUENZA A/B EACH AG IA: CPT | Mod: 59

## 2018-01-06 PROCEDURE — 84100 ASSAY OF PHOSPHORUS: CPT

## 2018-01-06 PROCEDURE — 63600175 PHARM REV CODE 636 W HCPCS: Performed by: PHYSICIAN ASSISTANT

## 2018-01-06 PROCEDURE — 83605 ASSAY OF LACTIC ACID: CPT

## 2018-01-06 PROCEDURE — 81001 URINALYSIS AUTO W/SCOPE: CPT

## 2018-01-06 PROCEDURE — 85025 COMPLETE CBC W/AUTO DIFF WBC: CPT

## 2018-01-06 PROCEDURE — 96375 TX/PRO/DX INJ NEW DRUG ADDON: CPT

## 2018-01-06 PROCEDURE — 25000003 PHARM REV CODE 250: Performed by: PHYSICIAN ASSISTANT

## 2018-01-06 PROCEDURE — 83735 ASSAY OF MAGNESIUM: CPT

## 2018-01-06 PROCEDURE — 99285 EMERGENCY DEPT VISIT HI MDM: CPT | Mod: 25

## 2018-01-06 PROCEDURE — 80053 COMPREHEN METABOLIC PANEL: CPT

## 2018-01-06 PROCEDURE — 87040 BLOOD CULTURE FOR BACTERIA: CPT | Mod: 59

## 2018-01-06 PROCEDURE — 99900035 HC TECH TIME PER 15 MIN (STAT)

## 2018-01-06 PROCEDURE — 82533 TOTAL CORTISOL: CPT

## 2018-01-06 PROCEDURE — 93005 ELECTROCARDIOGRAM TRACING: CPT

## 2018-01-06 PROCEDURE — 84443 ASSAY THYROID STIM HORMONE: CPT

## 2018-01-06 PROCEDURE — 85610 PROTHROMBIN TIME: CPT

## 2018-01-06 PROCEDURE — 96365 THER/PROPH/DIAG IV INF INIT: CPT

## 2018-01-06 RX ORDER — DOXYLAMINE SUCCINATE 25 MG
TABLET ORAL 2 TIMES DAILY
Status: DISCONTINUED | OUTPATIENT
Start: 2018-01-07 | End: 2018-01-06

## 2018-01-06 RX ORDER — DIPHENHYDRAMINE HYDROCHLORIDE 50 MG/ML
25 INJECTION INTRAMUSCULAR; INTRAVENOUS
Status: COMPLETED | OUTPATIENT
Start: 2018-01-06 | End: 2018-01-06

## 2018-01-06 RX ORDER — ATORVASTATIN CALCIUM 20 MG/1
40 TABLET, FILM COATED ORAL NIGHTLY
Status: DISCONTINUED | OUTPATIENT
Start: 2018-01-07 | End: 2018-01-08 | Stop reason: HOSPADM

## 2018-01-06 RX ORDER — GLUCAGON 1 MG
1 KIT INJECTION
Status: DISCONTINUED | OUTPATIENT
Start: 2018-01-06 | End: 2018-01-08 | Stop reason: HOSPADM

## 2018-01-06 RX ORDER — CEFTRIAXONE 1 G/1
1 INJECTION, POWDER, FOR SOLUTION INTRAMUSCULAR; INTRAVENOUS
Status: DISCONTINUED | OUTPATIENT
Start: 2018-01-07 | End: 2018-01-07

## 2018-01-06 RX ORDER — ONDANSETRON 8 MG/1
8 TABLET, ORALLY DISINTEGRATING ORAL EVERY 8 HOURS PRN
Status: DISCONTINUED | OUTPATIENT
Start: 2018-01-06 | End: 2018-01-08 | Stop reason: HOSPADM

## 2018-01-06 RX ORDER — IBUPROFEN 200 MG
24 TABLET ORAL
Status: DISCONTINUED | OUTPATIENT
Start: 2018-01-06 | End: 2018-01-08 | Stop reason: HOSPADM

## 2018-01-06 RX ORDER — SODIUM CHLORIDE 0.9 % (FLUSH) 0.9 %
5 SYRINGE (ML) INJECTION
Status: DISCONTINUED | OUTPATIENT
Start: 2018-01-06 | End: 2018-01-08 | Stop reason: HOSPADM

## 2018-01-06 RX ORDER — NAPROXEN SODIUM 220 MG/1
81 TABLET, FILM COATED ORAL DAILY
Status: DISCONTINUED | OUTPATIENT
Start: 2018-01-07 | End: 2018-01-08 | Stop reason: HOSPADM

## 2018-01-06 RX ORDER — AMOXICILLIN 250 MG
2 CAPSULE ORAL DAILY
Status: DISCONTINUED | OUTPATIENT
Start: 2018-01-07 | End: 2018-01-08 | Stop reason: HOSPADM

## 2018-01-06 RX ORDER — ENOXAPARIN SODIUM 100 MG/ML
30 INJECTION SUBCUTANEOUS EVERY 24 HOURS
Status: DISCONTINUED | OUTPATIENT
Start: 2018-01-06 | End: 2018-01-08 | Stop reason: HOSPADM

## 2018-01-06 RX ORDER — FLUCONAZOLE 100 MG/1
200 TABLET ORAL DAILY
Status: COMPLETED | OUTPATIENT
Start: 2018-01-07 | End: 2018-01-08

## 2018-01-06 RX ORDER — ACETAMINOPHEN 325 MG/1
650 TABLET ORAL EVERY 6 HOURS PRN
Status: DISCONTINUED | OUTPATIENT
Start: 2018-01-06 | End: 2018-01-08 | Stop reason: HOSPADM

## 2018-01-06 RX ORDER — CLOPIDOGREL BISULFATE 75 MG/1
75 TABLET ORAL DAILY
Status: DISCONTINUED | OUTPATIENT
Start: 2018-01-07 | End: 2018-01-08 | Stop reason: HOSPADM

## 2018-01-06 RX ORDER — METRONIDAZOLE 250 MG/1
250 TABLET ORAL 3 TIMES DAILY
Status: ON HOLD | COMMUNITY
End: 2018-01-08 | Stop reason: HOSPADM

## 2018-01-06 RX ORDER — CEFTRIAXONE 1 G/1
1 INJECTION, POWDER, FOR SOLUTION INTRAMUSCULAR; INTRAVENOUS
Status: COMPLETED | OUTPATIENT
Start: 2018-01-06 | End: 2018-01-06

## 2018-01-06 RX ORDER — INSULIN ASPART 100 [IU]/ML
0-5 INJECTION, SOLUTION INTRAVENOUS; SUBCUTANEOUS
Status: DISCONTINUED | OUTPATIENT
Start: 2018-01-06 | End: 2018-01-08 | Stop reason: HOSPADM

## 2018-01-06 RX ORDER — IBUPROFEN 200 MG
16 TABLET ORAL
Status: DISCONTINUED | OUTPATIENT
Start: 2018-01-06 | End: 2018-01-08 | Stop reason: HOSPADM

## 2018-01-06 RX ORDER — DOXYLAMINE SUCCINATE 25 MG
TABLET ORAL 2 TIMES DAILY
Status: DISCONTINUED | OUTPATIENT
Start: 2018-01-06 | End: 2018-01-08 | Stop reason: HOSPADM

## 2018-01-06 RX ORDER — TRIAMCINOLONE ACETONIDE 1 MG/G
CREAM TOPICAL
Status: DISCONTINUED | OUTPATIENT
Start: 2018-01-08 | End: 2018-01-08 | Stop reason: HOSPADM

## 2018-01-06 RX ORDER — RAMELTEON 8 MG/1
8 TABLET ORAL NIGHTLY PRN
Status: DISCONTINUED | OUTPATIENT
Start: 2018-01-06 | End: 2018-01-08 | Stop reason: HOSPADM

## 2018-01-06 RX ADMIN — CEFTRIAXONE SODIUM 1 G: 1 INJECTION, POWDER, FOR SOLUTION INTRAMUSCULAR; INTRAVENOUS at 04:01

## 2018-01-06 RX ADMIN — VANCOMYCIN HYDROCHLORIDE 2250 MG: 100 INJECTION, POWDER, LYOPHILIZED, FOR SOLUTION INTRAVENOUS at 05:01

## 2018-01-06 RX ADMIN — SODIUM CHLORIDE 1000 ML: 0.9 INJECTION, SOLUTION INTRAVENOUS at 04:01

## 2018-01-06 RX ADMIN — DIPHENHYDRAMINE HYDROCHLORIDE 25 MG: 50 INJECTION, SOLUTION INTRAMUSCULAR; INTRAVENOUS at 06:01

## 2018-01-06 NOTE — ED NOTES
"Pt presents with c/o hypothermia. Daughter states the pt was recently d/c from [place] 2 times for same complaints, states they were unable to get her temp above 93 and only able to obtain rectal temp. Pt has current rectal temp of 95. Pt reports being "cold and tired". Denies N/V/D, abd pain, CP, SOB. Pt has BLE wrapped and states she has wounds and is followed by wound care. Pt currently taking abx for UTI. Pt developed rash 2 days ago to BLE and lower abd with small red, dry pin point scabs which she states were itchy 2 days ago but aren't now. Area around is red. Daughter states pt is more lethargic than usual.  "

## 2018-01-06 NOTE — ED PROVIDER NOTES
"Encounter Date: 1/6/2018       History     Chief Complaint   Patient presents with    hypothermia     admitted to [facility] 2 times for same complaint. 92.3 at home. Daughter reports confusion also.      72 y/o WF with PMHx of venous insufficiency with chronic leg wounds (followed by wound care), hyperlipidemia, HTN, DM, TIA, nephrolithiasis presents to the ED c/o hypothermia. She was recently discharged on 12/31/17 from [facility] after being admitted for renal failure and hypothermia. She was admitted a few weeks prior to pyelonephritis. She was feeling fine after discharge but then started to "feel bad" again last night. She reports generalized fatigue and back pain. She developed a rash to her bilateral UE and lower abdomen 2 days ago. She denies chest pain, SOB, URI symptoms, abdominal pain, n/v, diarrhea, dysuria, headache, lightheadedness. She denies any sick contacts.      The history is provided by the patient.     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    Benign essential hypertension 11/14/2012    Cataract     s/p phacoemulsification    Chronic kidney disease (CKD), stage III (moderate) 9/12/2013    Diabetic peripheral neuropathy associated with type 2 diabetes mellitus 11/14/2014    causing right hemiparesis    Gait disorder     Hyperlipidemia     Iritis - Both Eyes 6/10/2013    Kidney stone     Lymphedema     Morbid obesity with BMI of 40.0-44.9, adult 2/18/2015    Nephrolithiasis 4/20/2016    NS (nuclear sclerosis) 4/1/2013    Nuclear sclerosis - Both Eyes 4/29/2013    Preseptal cellulitis - Right Eye 4/29/2013    Proliferative diabetic retinopathy - Both Eyes " 4/29/2013    Proliferative diabetic retinopathy, both eyes 4/1/2013    PSC (posterior subcapsular cataract) - Both Eyes 4/29/2013    S/P hernia repair 12/19/2012    TIA (transient ischemic attack) 11/18/2014    Tinea pedis 7/24/2012    Tinea pedis is present on both feet.    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 Kat Bower     Family History   Problem Relation Age of Onset    Diabetes Sister     Cataracts Sister     Heart disease Brother     Cataracts Brother     Leukemia Mother     Cancer Neg Hx     Amblyopia Neg Hx     Blindness Neg Hx     Glaucoma Neg Hx     Hypertension Neg Hx     Macular degeneration Neg Hx     Retinal detachment Neg Hx     Strabismus Neg Hx     Stroke Neg Hx     Thyroid disease Neg Hx     Kidney disease Neg Hx      Social History   Substance Use Topics    Smoking status: Former Smoker     Packs/day: 0.50     Years: 15.00     Types:  Cigarettes     Quit date: 7/9/1982    Smokeless tobacco: Former User      Comment: smoked one pack per week    Alcohol use No     Review of Systems   Constitutional: Positive for fatigue. Negative for fever.   HENT: Negative for congestion, rhinorrhea and sore throat.    Eyes: Negative for photophobia and visual disturbance.   Respiratory: Negative for cough and shortness of breath.    Cardiovascular: Negative for chest pain.   Gastrointestinal: Negative for abdominal pain, constipation, diarrhea, nausea and vomiting.   Genitourinary: Negative for dysuria and hematuria.   Musculoskeletal: Positive for back pain. Negative for myalgias.   Skin: Positive for rash.   Neurological: Negative for dizziness, syncope, weakness, light-headedness, numbness and headaches.   Psychiatric/Behavioral: Positive for confusion.       Physical Exam     Initial Vitals   BP Pulse Resp Temp SpO2   01/06/18 1328 01/06/18 1328 01/06/18 1328 01/06/18 1416 01/06/18 1328   (!) 224/99 79 18 (!) 95 °F (35 °C) 99 %      MAP       01/06/18 1328       140.67         Physical Exam    Nursing note and vitals reviewed.  Constitutional: She appears well-developed and well-nourished. She is not diaphoretic. No distress.   HENT:   Head: Normocephalic and atraumatic.   Neck: Normal range of motion. Neck supple.   Cardiovascular: Normal rate, regular rhythm and normal heart sounds. Exam reveals no gallop and no friction rub.    No murmur heard.  Pulmonary/Chest: Breath sounds normal. She has no wheezes. She has no rhonchi. She has no rales.   Abdominal: Soft. Bowel sounds are normal. There is no tenderness. There is no rebound and no guarding.   Musculoskeletal: Normal range of motion.   Neurological: She is alert and oriented to person, place, and time.   Skin: Skin is warm and dry. Rash noted. No erythema.   Erythematous rash noted to lower abdomen and bilateral legs with crusting. No vesicles. No bleeding or drainage.   Psychiatric: She has a normal  mood and affect.         ED Course   Procedures  Labs Reviewed   B-TYPE NATRIURETIC PEPTIDE - Abnormal; Notable for the following:        Result Value     (*)     All other components within normal limits   CBC W/ AUTO DIFFERENTIAL - Abnormal; Notable for the following:     RBC 3.78 (*)     Hemoglobin 11.1 (*)     Hematocrit 34.1 (*)     RDW 14.9 (*)     Lymph # 0.9 (*)     All other components within normal limits   COMPREHENSIVE METABOLIC PANEL - Abnormal; Notable for the following:     Glucose 152 (*)     BUN, Bld 27 (*)     Creatinine 1.6 (*)     Albumin 2.6 (*)     Alkaline Phosphatase 148 (*)     eGFR if  36.1 (*)     eGFR if non  31.3 (*)     All other components within normal limits   URINALYSIS, REFLEX TO URINE CULTURE - Abnormal; Notable for the following:     Appearance, UA Hazy (*)     Glucose, UA 2+ (*)     Occult Blood UA 2+ (*)     Leukocytes, UA 2+ (*)     All other components within normal limits    Narrative:     Preferred Collection Type->Urine, Clean Catch   URINALYSIS MICROSCOPIC - Abnormal; Notable for the following:     WBC, UA 32 (*)     Yeast, UA Few (*)     Hyaline Casts, UA 25 (*)     All other components within normal limits    Narrative:     Preferred Collection Type->Urine, Clean Catch   ISTAT PROCEDURE - Abnormal; Notable for the following:     POC PCO2 46.0 (*)     POC PO2 34 (*)     POC SATURATED O2 64 (*)     All other components within normal limits   GRAM STAIN   CULTURE, URINE   APTT   CORTISOL, RANDOM   LACTIC ACID, PLASMA   LIPASE   MAGNESIUM   PHOSPHORUS   PROTIME-INR   PROCALCITONIN   TSH   INFLUENZA A AND B ANTIGEN   DRUG SCREEN PANEL, URINE EMERGENCY   DRUG SCREEN PANEL, URINE EMERGENCY    Narrative:     Preferred Collection Type->Urine, Clean Catch  1 cup.  udrug add on per Jamila Plata PA-C [id number]  01/06/2018  21:21              Medical Decision Making:   History:   Old Medical Records: I decided to obtain old medical  records.  Old Records Summarized: records from previous admission(s).       <> Summary of Records: Admitted at [facility] twice in December for pyelonephritis and renal failure. Last admission received 1 dose of rocephin. Patient and family insist that she was discharged on abx but I do not see any abx on her medication list.  Clinical Tests:   Lab Tests: Ordered and Reviewed  Radiological Study: Ordered and Reviewed  Medical Tests: Reviewed and Ordered       APC / Resident Notes:   72 y/o WF with PMHx of venous insufficiency with chronic leg wounds (followed by wound care), hyperlipidemia, HTN, DM, TIA, nephrolithiasis presents to the ED c/o hypothermia. Hypothermic at 95F on rectal temperature. Regular rhythm. Lungs CTA. Abdomen soft and nontender. Erythematous rash noted to lower abdomen and bilateral upper legs with crusting. Dressings noted to bilateral LE from chronic wounds (followed by wound care and was seen yesterday). DDx includes but is not limited to sepsis, UTI, pyelonephritis, pneumonia, influenza, renal failure, electrolyte abnormality. Will get labs, CXR.     Patient's daughter called the pharmacy - she is currently on flagyl.    7:23 PM  IJamila PA-C, assume care of this patient at sign out. Patient admitted to hospital medicine for further evaluation and management of hypothermia and UTI. Rocephin given. Vanc given, but then patient developed urticaria to left forearm. BairHugger applied. Repeat temp of 97.6.              ED Course      Clinical Impression:   The primary encounter diagnosis was Urinary tract infection with hematuria, site unspecified. A diagnosis of Hypothermia, initial encounter was also pertinent to this visit.    Disposition:   Disposition: Admitted  Condition: Charles Ambrocio PA-C  01/07/18 0715

## 2018-01-06 NOTE — ED NOTES
LOC: The patient is awake, alert and aware of environment with an appropriate affect, the patient is oriented x 3 and speaking appropriately.  APPEARANCE: Patient resting comfortably and in no acute distress, patient is clean and well groomed  SKIN: The skin is warm and dry, color consistent with ethnicity, patient has normal skin turgor and moist mucus membranes, BLE wrapped due to venous insufficiency, followed by wound care. Pt has rash to BLE and lower abd.   MUSCULOSKELETAL: Patient moving all extremities well, swelling to BLE, pt does not ambulate, has electric wheelchair at home.   RESPIRATORY: Airway is open and patent, breath sounds clear throughout all lung fields; respirations are spontaneous, patient has a normal effort and rate, no accessory muscle use noted.   CARDIAC: Patient has no peripheral edema noted, capillary refill < 3 seconds. No complaints of chest pain   ABDOMEN: Soft and non tender to palpation, no distention noted. Bowel sounds present x 4  NEUROLOGIC: PERRL, 3mm bilaterally, eyes open spontaneously, behavior appropriate to situation, follows commands, facial expression symmetrical, bilateral hand grasp equal and even, purposeful motor response noted, normal sensation in all extremities when touched with a finger.

## 2018-01-07 LAB
ANION GAP SERPL CALC-SCNC: 8 MMOL/L
BACTERIA UR CULT: NO GROWTH
BASOPHILS # BLD AUTO: 0.02 K/UL
BASOPHILS NFR BLD: 0.5 %
BUN SERPL-MCNC: 23 MG/DL
CALCIUM SERPL-MCNC: 9 MG/DL
CHLORIDE SERPL-SCNC: 110 MMOL/L
CO2 SERPL-SCNC: 24 MMOL/L
CREAT SERPL-MCNC: 1.4 MG/DL
DIFFERENTIAL METHOD: ABNORMAL
EOSINOPHIL # BLD AUTO: 0 K/UL
EOSINOPHIL NFR BLD: 0.8 %
ERYTHROCYTE [DISTWIDTH] IN BLOOD BY AUTOMATED COUNT: 15 %
EST. GFR  (AFRICAN AMERICAN): 42.4 ML/MIN/1.73 M^2
EST. GFR  (NON AFRICAN AMERICAN): 36.8 ML/MIN/1.73 M^2
GLUCOSE SERPL-MCNC: 102 MG/DL
GRAM STN SPEC: NORMAL
GRAM STN SPEC: NORMAL
HCT VFR BLD AUTO: 30.6 %
HGB BLD-MCNC: 9.9 G/DL
IMM GRANULOCYTES # BLD AUTO: 0.01 K/UL
IMM GRANULOCYTES NFR BLD AUTO: 0.3 %
LYMPHOCYTES # BLD AUTO: 1 K/UL
LYMPHOCYTES NFR BLD: 27.4 %
MAGNESIUM SERPL-MCNC: 1.5 MG/DL
MCH RBC QN AUTO: 28.8 PG
MCHC RBC AUTO-ENTMCNC: 32.4 G/DL
MCV RBC AUTO: 89 FL
MONOCYTES # BLD AUTO: 0.4 K/UL
MONOCYTES NFR BLD: 9.4 %
NEUTROPHILS # BLD AUTO: 2.3 K/UL
NEUTROPHILS NFR BLD: 61.6 %
NRBC BLD-RTO: 0 /100 WBC
PHOSPHATE SERPL-MCNC: 2.5 MG/DL
PLATELET # BLD AUTO: 184 K/UL
PMV BLD AUTO: 9.8 FL
POCT GLUCOSE: 108 MG/DL (ref 70–110)
POCT GLUCOSE: 72 MG/DL (ref 70–110)
POCT GLUCOSE: 85 MG/DL (ref 70–110)
POTASSIUM SERPL-SCNC: 4.3 MMOL/L
RBC # BLD AUTO: 3.44 M/UL
SODIUM SERPL-SCNC: 142 MMOL/L
VANCOMYCIN SERPL-MCNC: 21.2 UG/ML
WBC # BLD AUTO: 3.72 K/UL

## 2018-01-07 PROCEDURE — 84100 ASSAY OF PHOSPHORUS: CPT

## 2018-01-07 PROCEDURE — 99223 1ST HOSP IP/OBS HIGH 75: CPT | Mod: AI,GC,, | Performed by: HOSPITALIST

## 2018-01-07 PROCEDURE — 25000003 PHARM REV CODE 250: Performed by: STUDENT IN AN ORGANIZED HEALTH CARE EDUCATION/TRAINING PROGRAM

## 2018-01-07 PROCEDURE — 11000001 HC ACUTE MED/SURG PRIVATE ROOM

## 2018-01-07 PROCEDURE — 85025 COMPLETE CBC W/AUTO DIFF WBC: CPT

## 2018-01-07 PROCEDURE — 63600175 PHARM REV CODE 636 W HCPCS: Performed by: STUDENT IN AN ORGANIZED HEALTH CARE EDUCATION/TRAINING PROGRAM

## 2018-01-07 PROCEDURE — 36415 COLL VENOUS BLD VENIPUNCTURE: CPT

## 2018-01-07 PROCEDURE — 63600175 PHARM REV CODE 636 W HCPCS: Performed by: HOSPITALIST

## 2018-01-07 PROCEDURE — A4216 STERILE WATER/SALINE, 10 ML: HCPCS | Performed by: STUDENT IN AN ORGANIZED HEALTH CARE EDUCATION/TRAINING PROGRAM

## 2018-01-07 PROCEDURE — 80048 BASIC METABOLIC PNL TOTAL CA: CPT

## 2018-01-07 PROCEDURE — 83735 ASSAY OF MAGNESIUM: CPT

## 2018-01-07 PROCEDURE — 80202 ASSAY OF VANCOMYCIN: CPT

## 2018-01-07 RX ORDER — SODIUM,POTASSIUM PHOSPHATES 280-250MG
1 POWDER IN PACKET (EA) ORAL
Status: COMPLETED | OUTPATIENT
Start: 2018-01-07 | End: 2018-01-07

## 2018-01-07 RX ORDER — MAGNESIUM SULFATE HEPTAHYDRATE 40 MG/ML
2 INJECTION, SOLUTION INTRAVENOUS ONCE
Status: COMPLETED | OUTPATIENT
Start: 2018-01-07 | End: 2018-01-07

## 2018-01-07 RX ADMIN — ASPIRIN 81 MG CHEWABLE TABLET 81 MG: 81 TABLET CHEWABLE at 09:01

## 2018-01-07 RX ADMIN — STANDARDIZED SENNA CONCENTRATE AND DOCUSATE SODIUM 2 TABLET: 8.6; 5 TABLET, FILM COATED ORAL at 09:01

## 2018-01-07 RX ADMIN — MICONAZOLE NITRATE: 20 CREAM TOPICAL at 09:01

## 2018-01-07 RX ADMIN — POTASSIUM & SODIUM PHOSPHATES POWDER PACK 280-160-250 MG 1 PACKET: 280-160-250 PACK at 12:01

## 2018-01-07 RX ADMIN — SODIUM CHLORIDE, PRESERVATIVE FREE 5 ML: 5 INJECTION INTRAVENOUS at 08:01

## 2018-01-07 RX ADMIN — ATORVASTATIN CALCIUM 40 MG: 20 TABLET, FILM COATED ORAL at 09:01

## 2018-01-07 RX ADMIN — ENOXAPARIN SODIUM 30 MG: 100 INJECTION SUBCUTANEOUS at 05:01

## 2018-01-07 RX ADMIN — CLOPIDOGREL 75 MG: 75 TABLET, FILM COATED ORAL at 09:01

## 2018-01-07 RX ADMIN — ENOXAPARIN SODIUM 30 MG: 100 INJECTION SUBCUTANEOUS at 02:01

## 2018-01-07 RX ADMIN — CEFTRIAXONE SODIUM 1 G: 1 INJECTION, POWDER, FOR SOLUTION INTRAMUSCULAR; INTRAVENOUS at 04:01

## 2018-01-07 RX ADMIN — MAGNESIUM SULFATE IN WATER 2 G: 40 INJECTION, SOLUTION INTRAVENOUS at 03:01

## 2018-01-07 RX ADMIN — MICONAZOLE NITRATE: 20 CREAM TOPICAL at 02:01

## 2018-01-07 RX ADMIN — FLUCONAZOLE 200 MG: 100 TABLET ORAL at 09:01

## 2018-01-07 RX ADMIN — POTASSIUM & SODIUM PHOSPHATES POWDER PACK 280-160-250 MG 1 PACKET: 280-160-250 PACK at 05:01

## 2018-01-07 NOTE — ASSESSMENT & PLAN NOTE
Stage 3 CKD due to type 2 DM  BUN/creatinine 27/1.6 which appears to be patients baseline  -S/p 1L NS in ED  -Avoid nephrotoxins.   -Will continue to monitor with daily BMP

## 2018-01-07 NOTE — H&P
"[facility] Hospital Medicine  History & Physical    Patient Name: Sherin Ortiz  MRN: [id number]  Admission Date: 1/6/2018  Attending Physician: Jesika Carlos MD   Primary Care Provider: Ame Vasquez MD    Hospital Medicine Team: [facility] HOSP MED 1 Christian Roberson MD     Patient information was obtained from daughter and ER records.     Subjective:     Principal Problem:Sepsis    Chief Complaint:   Chief Complaint   Patient presents with    hypothermia     admitted to [facility] 2 times for same complaint. 92.3 at home. Daughter reports confusion also.         HPI: Sherin Ortiz is a 73 y.o. 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 Her oral temperature at home today was reportedly 92.3F. Does endorse recent development of BUE and abdominal rash for which she was started on fluconazole per daughter. Also endorsing fatigue and back pain though these are chronic as well. She denies chest pain, SOB, URI symptoms, abdominal pain, n/v, diarrhea, dysuria, headache, lightheadedness. She denies any sick contacts.    Per chart review, patient "presented to [facility] 12/29/17 with weakness, fatigue, and chills x 2 days.  Again she reported temperature as low as 93F at home.  Denies dysuria, endorses poor appetite and oral intake.  She was prescribed ciprofloxacin 500 mg BID by her PCP on 12/26/17 for presumed UTI with no improvement in symptoms.  Upon arrival to ED patient with WBC count 3.54, temperature 93.8F, lactate 1.2, BUN 29, creatinine 2.1; U/A with 30 WBCs, few bacteria, and 1+ leukocytes.  She was given ceftriaxone and placed on Behr hugger to elevate temperature.  Admitted to Hospital Medicine's service for observation of LOREN."      Also per chart review from recent admission, patient "has been dependent on a motorized wheelchair " "since her 50s.  She lives in [place] and receives her outpatient medical care at [facility].  Her primary care physician is Dr. Ame Vasquez.  Her nephrologist is Dr. Cynthia Choi.  She is followed by HERMES Mccoy in Endocrinology clinic.  She is followed by NP Akosua Alvarenga in wound care clinic for bilateral leg wounds.  She gets home health for wound care.  She has a penicillin allergy but has tolerated cephalosporins.  She typically gets hypothermia and leukopenia when she has urinary tract infections (she was hospitalized at [facility] from 11/6/14-11/9/14 for evaluation of this, with no other etiology found other than a UTI), and again at [facility] 12/13/17-12/15/17 with UTI and hypothermia.  Urine cultures grew pan-sensitive E. Coli, patient was discharged home to complete cefdinir.  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    Benign essential hypertension 11/14/2012    Cataract     s/p phacoemulsification    Chronic kidney disease (CKD), stage III (moderate) 9/12/2013    Diabetic peripheral neuropathy associated with type 2 diabetes mellitus 11/14/2014    causing right hemiparesis    Gait disorder     Hyperlipidemia     Iritis - Both Eyes 6/10/2013    Kidney stone     Lymphedema     Morbid obesity with BMI of 40.0-44.9, adult 2/18/2015    Nephrolithiasis 4/20/2016    NS (nuclear sclerosis) 4/1/2013    Nuclear sclerosis - Both Eyes 4/29/2013    Preseptal cellulitis - Right Eye 4/29/2013    Proliferative diabetic retinopathy - Both Eyes 4/29/2013    Proliferative diabetic retinopathy, both eyes 4/1/2013    PSC (posterior subcapsular cataract) - Both Eyes 4/29/2013    S/P hernia repair 12/19/2012    TIA (transient ischemic attack) 11/18/2014    Tinea pedis 7/24/2012    Tinea " pedis is present on both feet.    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 Kat Bower       Review of patient's allergies indicates:   Allergen Reactions    Penicillins Hives     Other reaction(s): Hives    Sulfa (sulfonamide antibiotics) Other (See Comments)     Shakes, pt states her doctor told her the shakes were possibly caused by an allergy to sulfa       No current facility-administered medications on file prior to encounter.      Current Outpatient Prescriptions on File Prior to Encounter   Medication Sig    aspirin 81 MG Chew Take 81 mg by mouth once daily.      atorvastatin (LIPITOR) 40 MG tablet TAKE 1 TABLET EVERY EVENING    clopidogrel (PLAVIX) 75 mg tablet TAKE 1 TABLET ONE TIME DAILY    ergocalciferol (ERGOCALCIFEROL) 50,000 unit Cap Take 1 capsule (50,000 Units total) by mouth every 7 days. (Patient taking differently: Take 50,000 Units by mouth every Monday. )     "fluconazole (DIFLUCAN) 200 MG Tab Take 1 tablet (200 mg total) by mouth once daily.    glimepiride (AMARYL) 1 MG tablet Take 2 tablets (2 mg total) by mouth with lunch.    LANTUS 100 unit/mL injection INJECT 7 TO 10 UNITS SUBCUTANEOUSLY IN THE EVENING DEPENDING ON SCALE (DISCARD EACH VIAL AFTER 28 DAYS) (Patient taking differently: 35 units)    ACCU-CHEK [place]O PLUS METER [facility]     ACCU-CHEK RAMIRO PLUS TEST STRP Strp TEST TWICE DAILY    ACCU-CHEK SOFTCLIX LANCETS [facility]     BD INSULIN SYRINGE ULTRA-FINE 1/2 mL 30 gauge x 1/2" Syrg USE EVERY NIGHT    diclofenac sodium 1 % Gel Apply 2 g topically nightly as needed.    econazole nitrate 1 % cream Apply 1 application topically once daily. Apply to affected area    hydrochlorothiazide (HYDRODIURIL) 25 MG tablet Take 1 tablet (25 mg total) by mouth once daily.    triamcinolone acetonide 0.1% (KENALOG) 0.1 % cream APPLY TOPICALLY TO BOTH LEGS Q MONDAY AND FRIDAY     Family History     Problem Relation (Age of Onset)    Cataracts Sister, Brother    Diabetes Sister    Heart disease Brother    Leukemia Mother        Social History Main Topics    Smoking status: Former Smoker     Packs/day: 0.50     Years: 15.00     Types: Cigarettes     Quit date: 7/9/1982    Smokeless tobacco: Former User      Comment: smoked one pack per week    Alcohol use No    Drug use: No    Sexual activity: Not Currently     Review of Systems   Constitutional: Positive for fatigue. Negative for activity change and appetite change.   HENT: Negative for congestion and facial swelling.    Eyes: Negative for discharge and itching.   Respiratory: Negative for apnea, chest tightness and shortness of breath.    Cardiovascular: Negative for chest pain and palpitations.   Gastrointestinal: Negative for abdominal distention, anal bleeding, blood in stool, diarrhea, nausea and vomiting.   Endocrine: Negative for cold intolerance and heat intolerance.   Genitourinary: Negative for difficulty urinating " and dysuria.   Musculoskeletal: Positive for back pain.   Skin: Positive for rash. Negative for color change and pallor.   Neurological: Negative for dizziness and headaches.   Psychiatric/Behavioral: Negative for agitation and behavioral problems.     Objective:     Vital Signs (Most Recent):  Temp: 97.6 °F (36.4 °C) (01/06/18 1827)  Pulse: 64 (01/06/18 2143)  Resp: 18 (01/06/18 2143)  BP: (!) 122/56 (01/06/18 2143)  SpO2: 97 % (01/06/18 2143) Vital Signs (24h Range):  Temp:  [94.3 °F (34.6 °C)-97.6 °F (36.4 °C)] 97.6 °F (36.4 °C)  Pulse:  [64-79] 64  Resp:  [18] 18  SpO2:  [97 %-100 %] 97 %  BP: (115-224)/(56-99) 122/56     Weight: (!) 148.3 kg (327 lb)  Body mass index is 51.22 kg/m².    Physical Exam   Constitutional: She is oriented to person, place, and time. She appears well-developed and well-nourished.   HENT:   Head: Normocephalic and atraumatic.   Eyes: Conjunctivae, EOM and lids are normal.   Neck: Phonation normal. No edema present.   Cardiovascular: Normal rate, regular rhythm and normal heart sounds.    No murmur heard.  Pulmonary/Chest: Effort normal and breath sounds normal. She has no wheezes. She has no rales.   Abdominal: Soft. Normal appearance and bowel sounds are normal. She exhibits no distension. There is no hepatosplenomegaly. There is no tenderness.   Neurological: She is alert and oriented to person, place, and time.   Skin: Skin is warm, dry and intact. Rash (Erythematous, non vesicular abdominal rash ) noted.   Psychiatric: She has a normal mood and affect. Her speech is normal and behavior is normal.     Significant Labs:   Recent Results (from the past 24 hour(s))   APTT    Collection Time: 01/06/18  3:48 PM   Result Value Ref Range    aPTT 24.3 21.0 - 32.0 sec   Brain natriuretic peptide    Collection Time: 01/06/18  3:48 PM   Result Value Ref Range     (H) 0 - 99 pg/mL   CBC auto differential    Collection Time: 01/06/18  3:48 PM   Result Value Ref Range    WBC 4.12 3.90 -  12.70 K/uL    RBC 3.78 (L) 4.00 - 5.40 M/uL    Hemoglobin 11.1 (L) 12.0 - 16.0 g/dL    Hematocrit 34.1 (L) 37.0 - 48.5 %    MCV 90 82 - 98 fL    MCH 29.4 27.0 - 31.0 pg    MCHC 32.6 32.0 - 36.0 g/dL    RDW 14.9 (H) 11.5 - 14.5 %    Platelets 184 150 - 350 K/uL    MPV 10.0 9.2 - 12.9 fL    Immature Granulocytes 0.2 0.0 - 0.5 %    Gran # 2.8 1.8 - 7.7 K/uL    Immature Grans (Abs) 0.01 0.00 - 0.04 K/uL    Lymph # 0.9 (L) 1.0 - 4.8 K/uL    Mono # 0.3 0.3 - 1.0 K/uL    Eos # 0.0 0.0 - 0.5 K/uL    Baso # 0.02 0.00 - 0.20 K/uL    nRBC 0 0 /100 WBC    Gran% 68.9 38.0 - 73.0 %    Lymph% 22.1 18.0 - 48.0 %    Mono% 7.8 4.0 - 15.0 %    Eosinophil% 0.5 0.0 - 8.0 %    Basophil% 0.5 0.0 - 1.9 %    Differential Method Automated    Comprehensive metabolic panel    Collection Time: 01/06/18  3:48 PM   Result Value Ref Range    Sodium 141 136 - 145 mmol/L    Potassium 4.9 3.5 - 5.1 mmol/L    Chloride 107 95 - 110 mmol/L    CO2 26 23 - 29 mmol/L    Glucose 152 (H) 70 - 110 mg/dL    BUN, Bld 27 (H) 8 - 23 mg/dL    Creatinine 1.6 (H) 0.5 - 1.4 mg/dL    Calcium 9.7 8.7 - 10.5 mg/dL    Total Protein 7.2 6.0 - 8.4 g/dL    Albumin 2.6 (L) 3.5 - 5.2 g/dL    Total Bilirubin 0.4 0.1 - 1.0 mg/dL    Alkaline Phosphatase 148 (H) 55 - 135 U/L    AST 24 10 - 40 U/L    ALT 26 10 - 44 U/L    Anion Gap 8 8 - 16 mmol/L    eGFR if African American 36.1 (A) >60 mL/min/1.73 m^2    eGFR if non  31.3 (A) >60 mL/min/1.73 m^2   Cortisol    Collection Time: 01/06/18  3:48 PM   Result Value Ref Range    Cortisol 10.5 ug/dL   Lactic acid, plasma #1    Collection Time: 01/06/18  3:48 PM   Result Value Ref Range    Lactate (Lactic Acid) 1.1 0.5 - 2.2 mmol/L   Lipase    Collection Time: 01/06/18  3:48 PM   Result Value Ref Range    Lipase 35 4 - 60 U/L   Magnesium    Collection Time: 01/06/18  3:48 PM   Result Value Ref Range    Magnesium 1.6 1.6 - 2.6 mg/dL   Phosphorus    Collection Time: 01/06/18  3:48 PM   Result Value Ref Range    Phosphorus  2.9 2.7 - 4.5 mg/dL   Protime-INR    Collection Time: 01/06/18  3:48 PM   Result Value Ref Range    Prothrombin Time 10.3 9.0 - 12.5 sec    INR 1.0 0.8 - 1.2   Procalcitonin    Collection Time: 01/06/18  3:48 PM   Result Value Ref Range    Procalcitonin 0.03 <0.25 ng/mL   TSH    Collection Time: 01/06/18  3:48 PM   Result Value Ref Range    TSH 1.733 0.400 - 4.000 uIU/mL   Influenza antigen    Collection Time: 01/06/18  3:55 PM   Result Value Ref Range    Influenza A Ag, EIA Negative Negative    Influenza B Ag, EIA Negative Negative    Flu A & B Source NP    ISTAT PROCEDURE    Collection Time: 01/06/18  3:56 PM   Result Value Ref Range    POC PH 7.379 7.35 - 7.45    POC PCO2 46.0 (H) 35 - 45 mmHg    POC PO2 34 (LL) 40 - 60 mmHg    POC HCO3 27.2 24 - 28 mmol/L    POC BE 2 -2 to 2 mmol/L    POC SATURATED O2 64 (L) 95 - 100 %    POC Lactate 1.04 0.5 - 2.2 mmol/L    POC TCO2 29 24 - 29 mmol/L    Sample VENOUS     Site Other     Allens Test N/A    Urinalysis, Reflex to Urine Culture Urine, Clean Catch    Collection Time: 01/06/18  6:06 PM   Result Value Ref Range    Specimen UA Urine, Catheterized     Color, UA Yellow Yellow, Straw, Ana Lilia    Appearance, UA Hazy (A) Clear    pH, UA 5.0 5.0 - 8.0    Specific Gravity, UA 1.015 1.005 - 1.030    Protein, UA Negative Negative    Glucose, UA 2+ (A) Negative    Ketones, UA Negative Negative    Bilirubin (UA) Negative Negative    Occult Blood UA 2+ (A) Negative    Nitrite, UA Negative Negative    Urobilinogen, UA Negative <2.0 EU/dL    Leukocytes, UA 2+ (A) Negative   Urinalysis Microscopic    Collection Time: 01/06/18  6:06 PM   Result Value Ref Range    RBC, UA 3 0 - 4 /hpf    WBC, UA 32 (H) 0 - 5 /hpf    WBC Clumps, UA Rare None-Rare    Bacteria, UA Occasional None-Occ /hpf    Yeast, UA Few (A) None    Squam Epithel, UA 1 /hpf    Hyaline Casts, UA 25 (A) 0-1/lpf /lpf    Microscopic Comment SEE COMMENT    Drug screen panel, emergency    Collection Time: 01/06/18  6:06 PM    Result Value Ref Range    Benzodiazepines Negative     Methadone metabolites Negative     Cocaine (Metab.) Negative     Opiate Scrn, Ur Negative     Barbiturate Screen, Ur Negative     Amphetamine Screen, Ur Negative     THC Negative     Phencyclidine Negative     Creatinine, Random Ur 150.0 15.0 - 325.0 mg/dL    Toxicology Information SEE COMMENT      Significant Imaging:  CXR 01/06/2018:  One view: There is cardiomegaly, moderate edema.    Assessment/Plan:     * Sepsis    73 y.o. F with recurent UTIs and newly developed abdominal rash, hypothermic (baseline hypothermic per daughter) and tachycardic on admission. Vitals now WNL  -Received 1g rocephin and 2.25g vanc in Ed, will continue rocephin  -Vanc random in the AM to assess level given that patient received large dose   -S/p 1L NS bolus  -Ucx, urine gram stain, BCX IP  -UA revealing for 2+ leuks and hyaline casts  -No leukocytosis on initial labs, will trend  -Lactate WNL      Urinary tract infection with hematuria    Patient with history of recurrent UTI in the past  -Urine gram stain and culture pending.    -Received 1g ceftriaxone in the Ed, will continue      Hypothermia    Reportedly 92.3F today at home, 95F on admission, most recently 97.6  Ruth Ann Lemon PRN      Stage 3 chronic kidney disease    Stage 3 CKD due to type 2 DM  BUN/creatinine 27/1.6 which appears to be patients baseline  -S/p 1L NS in ED  -Avoid nephrotoxins.   -Will continue to monitor with daily BMP      Essential hypertension    BP with one aberrant reading of SBP 220s, otherwise 107-150s  -Holding lisinopril and HCTZ as patient has been normotensive. She reportedly has had one of these medications discontinued recently though she cannot recall which one.   -Continue to monitor.       Chronic venous hypertension with ulcer involving both sides    Chronic venous insufficiency of BLE  -Continue prior wound care orders, which was to change dressings every Monday and Thursday,  last changed  today on admission 01/06/2017  -Wound care consulted to evaluate patient      Uncontrolled type 2 diabetes mellitus with stage 3 chronic kidney disease, with long-term current use of insulin    Uncontrolled type 2 DM with stage 3 CKD, with long-term use of insulin    -Holding home glimepiride while inpatient.  Takes lantus 35 units qHS.    -Will put on LDSSI, will initiate levemir based on trend.  -Pattern glucose and adjust insulin dose as necessary.  Accuchecks with SSI.      Dermatitis, stasis    Chronic.  Continue miconazole topical antifungal cream BID.      Morbid obesity with BMI of 40.0-44.9, adult    Chronic      Weakness    Baseline, chronic      Hyperlipidemia LDL goal <100    Continue Statin         VTE Risk Mitigation         Ordered     enoxaparin injection 30 mg  Daily     Route:  Subcutaneous        01/06/18 2203     Medium Risk of VTE  Once      01/06/18 2203        Christian Roberson MD  Department of Hospital Medicine   [facility]

## 2018-01-07 NOTE — SUBJECTIVE & OBJECTIVE
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    Benign essential hypertension 11/14/2012    Cataract     s/p phacoemulsification    Chronic kidney disease (CKD), stage III (moderate) 9/12/2013    Diabetic peripheral neuropathy associated with type 2 diabetes mellitus 11/14/2014    causing right hemiparesis    Gait disorder     Hyperlipidemia     Iritis - Both Eyes 6/10/2013    Kidney stone     Lymphedema     Morbid obesity with BMI of 40.0-44.9, adult 2/18/2015    Nephrolithiasis 4/20/2016    NS (nuclear sclerosis) 4/1/2013    Nuclear sclerosis - Both Eyes 4/29/2013    Preseptal cellulitis - Right Eye 4/29/2013    Proliferative diabetic retinopathy - Both Eyes 4/29/2013    Proliferative diabetic retinopathy, both eyes 4/1/2013    PSC (posterior subcapsular cataract) - Both Eyes 4/29/2013    S/P hernia repair 12/19/2012    TIA (transient ischemic attack) 11/18/2014    Tinea pedis 7/24/2012    Tinea pedis is present on both feet.     Type 2 diabetes mellitus with renal manifestations, controlled 12/12/2013    Type 2 diabetes, controlled, with moderate nonproliferative diabetic retinopathy without macular edema 9/17/2015    Ulcer of left lower extremity, limited to breakdown of skin 7/8/2015    Unspecified cerebral artery occlusion with cerebral infarction 11/16/2014    Unspecified venous (peripheral) insufficiency     Ureteral stone with hydronephrosis 1/27/2016    UTI (lower urinary tract infection)     Vaginal infection     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       Past Surgical History:   Procedure Laterality Date    APPENDECTOMY      CATARACT EXTRACTION W/  INTRAOCULAR LENS IMPLANT Left 5/21/2013    CATARACT EXTRACTION W/  INTRAOCULAR LENS IMPLANT Right 6/4/2013    CHOLECYSTECTOMY      COLONOSCOPY  12/22/2005    normal    ESOPHAGOGASTRODUODENOSCOPY  12/21/2015    hiatal hernia, Schatzki ring    EYE SURGERY Bilateral 2008  "   laser surgery both eyes    NASAL SEPTUM SURGERY      SUBTOTAL COLECTOMY  12/13/2012    transverse colon, for incarcerated umbilical hernia, Dr. Kat Bower       Review of patient's allergies indicates:   Allergen Reactions    Penicillins Hives     Other reaction(s): Hives    Sulfa (sulfonamide antibiotics) Other (See Comments)     Shakes, pt states her doctor told her the shakes were possibly caused by an allergy to sulfa       No current facility-administered medications on file prior to encounter.      Current Outpatient Prescriptions on File Prior to Encounter   Medication Sig    aspirin 81 MG Chew Take 81 mg by mouth once daily.      atorvastatin (LIPITOR) 40 MG tablet TAKE 1 TABLET EVERY EVENING    clopidogrel (PLAVIX) 75 mg tablet TAKE 1 TABLET ONE TIME DAILY    ergocalciferol (ERGOCALCIFEROL) 50,000 unit Cap Take 1 capsule (50,000 Units total) by mouth every 7 days. (Patient taking differently: Take 50,000 Units by mouth every Monday. )    fluconazole (DIFLUCAN) 200 MG Tab Take 1 tablet (200 mg total) by mouth once daily.    glimepiride (AMARYL) 1 MG tablet Take 2 tablets (2 mg total) by mouth with lunch.    LANTUS 100 unit/mL injection INJECT 7 TO 10 UNITS SUBCUTANEOUSLY IN THE EVENING DEPENDING ON SCALE (DISCARD EACH VIAL AFTER 28 DAYS) (Patient taking differently: 35 units)    ACCU-CHEK [place] PLUS METER [facility]     ACCU-CHEK RAMIRO PLUS TEST STRP Strp TEST TWICE DAILY    ACCU-CHEK SOFTCLIX LANCETS [facility] [place]     BD INSULIN SYRINGE ULTRA-FINE 1/2 mL 30 gauge x 1/2" Syrg USE EVERY NIGHT    diclofenac sodium 1 % Gel Apply 2 g topically nightly as needed.    econazole nitrate 1 % cream Apply 1 application topically once daily. Apply to affected area    hydrochlorothiazide (HYDRODIURIL) 25 MG tablet Take 1 tablet (25 mg total) by mouth once daily.    triamcinolone acetonide 0.1% (KENALOG) 0.1 % cream APPLY TOPICALLY TO BOTH LEGS Q MONDAY AND FRIDAY     Family History     Problem Relation " (Age of Onset)    Cataracts Sister, Brother    Diabetes Sister    Heart disease Brother    Leukemia Mother        Social History Main Topics    Smoking status: Former Smoker     Packs/day: 0.50     Years: 15.00     Types: Cigarettes     Quit date: 7/9/1982    Smokeless tobacco: Former User      Comment: smoked one pack per week    Alcohol use No    Drug use: No    Sexual activity: Not Currently     Review of Systems   Constitutional: Positive for fatigue. Negative for activity change and appetite change.   HENT: Negative for congestion and facial swelling.    Eyes: Negative for discharge and itching.   Respiratory: Negative for apnea, chest tightness and shortness of breath.    Cardiovascular: Negative for chest pain and palpitations.   Gastrointestinal: Negative for abdominal distention, anal bleeding, blood in stool, diarrhea, nausea and vomiting.   Endocrine: Negative for cold intolerance and heat intolerance.   Genitourinary: Negative for difficulty urinating and dysuria.   Musculoskeletal: Positive for back pain.   Skin: Positive for rash. Negative for color change and pallor.   Neurological: Negative for dizziness and headaches.   Psychiatric/Behavioral: Negative for agitation and behavioral problems.     Objective:     Vital Signs (Most Recent):  Temp: 97.6 °F (36.4 °C) (01/06/18 1827)  Pulse: 64 (01/06/18 2143)  Resp: 18 (01/06/18 2143)  BP: (!) 122/56 (01/06/18 2143)  SpO2: 97 % (01/06/18 2143) Vital Signs (24h Range):  Temp:  [94.3 °F (34.6 °C)-97.6 °F (36.4 °C)] 97.6 °F (36.4 °C)  Pulse:  [64-79] 64  Resp:  [18] 18  SpO2:  [97 %-100 %] 97 %  BP: (115-224)/(56-99) 122/56     Weight: (!) 148.3 kg (327 lb)  Body mass index is 51.22 kg/m².    Physical Exam   Constitutional: She is oriented to person, place, and time. She appears well-developed and well-nourished.   HENT:   Head: Normocephalic and atraumatic.   Eyes: Conjunctivae, EOM and lids are normal.   Neck: Phonation normal. No edema present.    Cardiovascular: Normal rate, regular rhythm and normal heart sounds.    No murmur heard.  Pulmonary/Chest: Effort normal and breath sounds normal. She has no wheezes. She has no rales.   Abdominal: Soft. Normal appearance and bowel sounds are normal. She exhibits no distension. There is no hepatosplenomegaly. There is no tenderness.   Neurological: She is alert and oriented to person, place, and time.   Skin: Skin is warm, dry and intact. Rash (Erythematous, non vesicular abdominal rash ) noted.   Psychiatric: She has a normal mood and affect. Her speech is normal and behavior is normal.         CRANIAL NERVES     CN III, IV, VI   Extraocular motions are normal.        Significant Labs:   Recent Results (from the past 24 hour(s))   APTT    Collection Time: 01/06/18  3:48 PM   Result Value Ref Range    aPTT 24.3 21.0 - 32.0 sec   Brain natriuretic peptide    Collection Time: 01/06/18  3:48 PM   Result Value Ref Range     (H) 0 - 99 pg/mL   CBC auto differential    Collection Time: 01/06/18  3:48 PM   Result Value Ref Range    WBC 4.12 3.90 - 12.70 K/uL    RBC 3.78 (L) 4.00 - 5.40 M/uL    Hemoglobin 11.1 (L) 12.0 - 16.0 g/dL    Hematocrit 34.1 (L) 37.0 - 48.5 %    MCV 90 82 - 98 fL    MCH 29.4 27.0 - 31.0 pg    MCHC 32.6 32.0 - 36.0 g/dL    RDW 14.9 (H) 11.5 - 14.5 %    Platelets 184 150 - 350 K/uL    MPV 10.0 9.2 - 12.9 fL    Immature Granulocytes 0.2 0.0 - 0.5 %    Gran # 2.8 1.8 - 7.7 K/uL    Immature Grans (Abs) 0.01 0.00 - 0.04 K/uL    Lymph # 0.9 (L) 1.0 - 4.8 K/uL    Mono # 0.3 0.3 - 1.0 K/uL    Eos # 0.0 0.0 - 0.5 K/uL    Baso # 0.02 0.00 - 0.20 K/uL    nRBC 0 0 /100 WBC    Gran% 68.9 38.0 - 73.0 %    Lymph% 22.1 18.0 - 48.0 %    Mono% 7.8 4.0 - 15.0 %    Eosinophil% 0.5 0.0 - 8.0 %    Basophil% 0.5 0.0 - 1.9 %    Differential Method Automated    Comprehensive metabolic panel    Collection Time: 01/06/18  3:48 PM   Result Value Ref Range    Sodium 141 136 - 145 mmol/L    Potassium 4.9 3.5 - 5.1 mmol/L     Chloride 107 95 - 110 mmol/L    CO2 26 23 - 29 mmol/L    Glucose 152 (H) 70 - 110 mg/dL    BUN, Bld 27 (H) 8 - 23 mg/dL    Creatinine 1.6 (H) 0.5 - 1.4 mg/dL    Calcium 9.7 8.7 - 10.5 mg/dL    Total Protein 7.2 6.0 - 8.4 g/dL    Albumin 2.6 (L) 3.5 - 5.2 g/dL    Total Bilirubin 0.4 0.1 - 1.0 mg/dL    Alkaline Phosphatase 148 (H) 55 - 135 U/L    AST 24 10 - 40 U/L    ALT 26 10 - 44 U/L    Anion Gap 8 8 - 16 mmol/L    eGFR if African American 36.1 (A) >60 mL/min/1.73 m^2    eGFR if non  31.3 (A) >60 mL/min/1.73 m^2   Cortisol    Collection Time: 01/06/18  3:48 PM   Result Value Ref Range    Cortisol 10.5 ug/dL   Lactic acid, plasma #1    Collection Time: 01/06/18  3:48 PM   Result Value Ref Range    Lactate (Lactic Acid) 1.1 0.5 - 2.2 mmol/L   Lipase    Collection Time: 01/06/18  3:48 PM   Result Value Ref Range    Lipase 35 4 - 60 U/L   Magnesium    Collection Time: 01/06/18  3:48 PM   Result Value Ref Range    Magnesium 1.6 1.6 - 2.6 mg/dL   Phosphorus    Collection Time: 01/06/18  3:48 PM   Result Value Ref Range    Phosphorus 2.9 2.7 - 4.5 mg/dL   Protime-INR    Collection Time: 01/06/18  3:48 PM   Result Value Ref Range    Prothrombin Time 10.3 9.0 - 12.5 sec    INR 1.0 0.8 - 1.2   Procalcitonin    Collection Time: 01/06/18  3:48 PM   Result Value Ref Range    Procalcitonin 0.03 <0.25 ng/mL   TSH    Collection Time: 01/06/18  3:48 PM   Result Value Ref Range    TSH 1.733 0.400 - 4.000 uIU/mL   Influenza antigen    Collection Time: 01/06/18  3:55 PM   Result Value Ref Range    Influenza A Ag, EIA Negative Negative    Influenza B Ag, EIA Negative Negative    Flu A & B Source NP    ISTAT PROCEDURE    Collection Time: 01/06/18  3:56 PM   Result Value Ref Range    POC PH 7.379 7.35 - 7.45    POC PCO2 46.0 (H) 35 - 45 mmHg    POC PO2 34 (LL) 40 - 60 mmHg    POC HCO3 27.2 24 - 28 mmol/L    POC BE 2 -2 to 2 mmol/L    POC SATURATED O2 64 (L) 95 - 100 %    POC Lactate 1.04 0.5 - 2.2 mmol/L    POC TCO2 29  24 - 29 mmol/L    Sample VENOUS     Site Other     Allens Test N/A    Urinalysis, Reflex to Urine Culture Urine, Clean Catch    Collection Time: 01/06/18  6:06 PM   Result Value Ref Range    Specimen UA Urine, Catheterized     Color, UA Yellow Yellow, Straw, Ana Lilia    Appearance, UA Hazy (A) Clear    pH, UA 5.0 5.0 - 8.0    Specific Gravity, UA 1.015 1.005 - 1.030    Protein, UA Negative Negative    Glucose, UA 2+ (A) Negative    Ketones, UA Negative Negative    Bilirubin (UA) Negative Negative    Occult Blood UA 2+ (A) Negative    Nitrite, UA Negative Negative    Urobilinogen, UA Negative <2.0 EU/dL    Leukocytes, UA 2+ (A) Negative   Urinalysis Microscopic    Collection Time: 01/06/18  6:06 PM   Result Value Ref Range    RBC, UA 3 0 - 4 /hpf    WBC, UA 32 (H) 0 - 5 /hpf    WBC Clumps, UA Rare None-Rare    Bacteria, UA Occasional None-Occ /hpf    Yeast, UA Few (A) None    Squam Epithel, UA 1 /hpf    Hyaline Casts, UA 25 (A) 0-1/lpf /lpf    Microscopic Comment SEE COMMENT    Drug screen panel, emergency    Collection Time: 01/06/18  6:06 PM   Result Value Ref Range    Benzodiazepines Negative     Methadone metabolites Negative     Cocaine (Metab.) Negative     Opiate Scrn, Ur Negative     Barbiturate Screen, Ur Negative     Amphetamine Screen, Ur Negative     THC Negative     Phencyclidine Negative     Creatinine, Random Ur 150.0 15.0 - 325.0 mg/dL    Toxicology Information SEE COMMENT      Significant Imaging:  CXR 01/06/2018:  One view: There is cardiomegaly, moderate edema.

## 2018-01-07 NOTE — ASSESSMENT & PLAN NOTE
Chronic venous insufficiency of BLE  -Continue prior wound care orders, which was to change dressings every Monday and Thursday--> last changed today on admission 01/06/2017  -Wound care consulted to evaluate patient

## 2018-01-07 NOTE — ASSESSMENT & PLAN NOTE
73 y.o. F with recurent UTIs and newly developed abdominal rash, hypothermic (baseline hypothermic per daughter) and tachycardic on admission. Vitals now WNL  -Received 1g rocephin and 2.25g vanc in Ed, will continue rocephin  -S/p 1L NS bolus  -Ucx, urine gram stain, BCX IP  -UA revealing for 2+ leuks and hyaline casts  -No leukocytosis on initial labs, will trend  -Lactate WNL

## 2018-01-07 NOTE — PLAN OF CARE
Plan of care went over with patient and daughter at beginning of shift: frequent v/s, frequent checks, rest and sleep, maintaining body temperature, tele-monitoring, daily weights, admission assessment, cbg checks, Purewick bladder collection, drying yeast infection to legs, assessment of lower wounds to legs and daily weights.    All was accomplished. Patient's body temp went up. Plastic Ruth Ann Hugger blanket intact and machine ordered but not up yet. Patient has had trouble maintaining a warm body temperature in the past and present. Patient's Purewick is working effectively and her first dose of medication to the thighs was placed. Will continue to monitor.

## 2018-01-07 NOTE — ASSESSMENT & PLAN NOTE
Patient with history of recurrent UTI in the past  -Urine gram stain and culture pending.    -Received 1g ceftriaxone in the ED

## 2018-01-07 NOTE — ASSESSMENT & PLAN NOTE
Uncontrolled type 2 DM with stage 3 CKD, with long-term use of insulin    -Holding home glimepiride while inpatient.  Takes lantus 35 units qHS.    -Will put on LDSSI, will initiate levemir based on trend.  -Pattern glucose and adjust insulin dose as necessary.  Accuchecks with SSI.

## 2018-01-07 NOTE — ASSESSMENT & PLAN NOTE
BP with one aberrant reading of SBP 220s, otherwise 107-150s  -Holding lisinopril and HCTZ as patient has been normotensive. She reportedly has had one of these medications discontinued recently though she cannot recall which one.   -Continue to monitor.

## 2018-01-07 NOTE — HPI
"Sherin Ortiz is a 73 y.o. 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 Her oral temperature at home today was reportedly 92.3F. Does endorse recent development of BUE and abdominal rash for which she was started on fluconazole per daughter. Also endorsing fatigue and back pain though these are chronic as well. She denies chest pain, SOB, URI symptoms, abdominal pain, n/v, diarrhea, dysuria, headache, lightheadedness. She denies any sick contacts.    Per chart review, patient "presented to [facility] 12/29/17 with weakness, fatigue, and chills x 2 days.  Again she reported temperature as low as 93F at home.  Denies dysuria, endorses poor appetite and oral intake.  She was prescribed ciprofloxacin 500 mg BID by her PCP on 12/26/17 for presumed UTI with no improvement in symptoms.  Upon arrival to ED patient with WBC count 3.54, temperature 93.8F, lactate 1.2, BUN 29, creatinine 2.1; U/A with 30 WBCs, few bacteria, and 1+ leukocytes.  She was given ceftriaxone and placed on Behr hugger to elevate temperature.  Admitted to Hospital Medicine's service for observation of LOREN."      Also per chart review from recent admission, patient "has been dependent on a motorized wheelchair since her 50s.  She lives in [place] and receives her outpatient medical care at [facility].  Her primary care physician is Dr. Ame Vasquez.  Her nephrologist is Dr. Cynthia Choi.  She is followed by HERMES Mccoy in Endocrinology clinic.  She is followed by HERMES Alvarenga in wound care clinic for bilateral leg wounds.  She gets home health for wound care.  She has a penicillin allergy but has tolerated cephalosporins.  She typically gets hypothermia and leukopenia when she has urinary tract infections (she was hospitalized at [facility] from " "11/6/14-11/9/14 for evaluation of this, with no other etiology found other than a UTI), and again at [facility] 12/13/17-12/15/17 with UTI and hypothermia.  Urine cultures grew pan-sensitive E. Coli, patient was discharged home to complete cefdinir.  PT/OT were consulted and recommended SNF placement, patient declined and opted for  PT/OT with home health."  "

## 2018-01-08 VITALS
OXYGEN SATURATION: 95 % | HEART RATE: 73 BPM | TEMPERATURE: 98 F | HEIGHT: 70 IN | BODY MASS INDEX: 41.95 KG/M2 | SYSTOLIC BLOOD PRESSURE: 132 MMHG | WEIGHT: 293 LBS | RESPIRATION RATE: 20 BRPM | DIASTOLIC BLOOD PRESSURE: 60 MMHG

## 2018-01-08 LAB
ANION GAP SERPL CALC-SCNC: 8 MMOL/L
BASOPHILS # BLD AUTO: 0.02 K/UL
BASOPHILS NFR BLD: 0.5 %
BUN SERPL-MCNC: 25 MG/DL
CALCIUM SERPL-MCNC: 9.1 MG/DL
CHLORIDE SERPL-SCNC: 108 MMOL/L
CO2 SERPL-SCNC: 24 MMOL/L
CREAT SERPL-MCNC: 1.6 MG/DL
DIFFERENTIAL METHOD: ABNORMAL
EOSINOPHIL # BLD AUTO: 0 K/UL
EOSINOPHIL NFR BLD: 0.5 %
ERYTHROCYTE [DISTWIDTH] IN BLOOD BY AUTOMATED COUNT: 14.9 %
EST. GFR  (AFRICAN AMERICAN): 36.1 ML/MIN/1.73 M^2
EST. GFR  (NON AFRICAN AMERICAN): 31.3 ML/MIN/1.73 M^2
GLUCOSE SERPL-MCNC: 129 MG/DL
HCT VFR BLD AUTO: 31 %
HGB BLD-MCNC: 10.1 G/DL
IMM GRANULOCYTES # BLD AUTO: 0.01 K/UL
IMM GRANULOCYTES NFR BLD AUTO: 0.2 %
LYMPHOCYTES # BLD AUTO: 1.1 K/UL
LYMPHOCYTES NFR BLD: 27.2 %
MAGNESIUM SERPL-MCNC: 1.7 MG/DL
MCH RBC QN AUTO: 29.5 PG
MCHC RBC AUTO-ENTMCNC: 32.6 G/DL
MCV RBC AUTO: 91 FL
MONOCYTES # BLD AUTO: 0.4 K/UL
MONOCYTES NFR BLD: 10.9 %
NEUTROPHILS # BLD AUTO: 2.5 K/UL
NEUTROPHILS NFR BLD: 60.7 %
NRBC BLD-RTO: 0 /100 WBC
PHOSPHATE SERPL-MCNC: 2.7 MG/DL
PLATELET # BLD AUTO: 195 K/UL
PMV BLD AUTO: 9.8 FL
POCT GLUCOSE: 128 MG/DL (ref 70–110)
POCT GLUCOSE: 176 MG/DL (ref 70–110)
POCT GLUCOSE: 181 MG/DL (ref 70–110)
POTASSIUM SERPL-SCNC: 4.6 MMOL/L
RBC # BLD AUTO: 3.42 M/UL
SODIUM SERPL-SCNC: 140 MMOL/L
WBC # BLD AUTO: 4.05 K/UL

## 2018-01-08 PROCEDURE — 83735 ASSAY OF MAGNESIUM: CPT

## 2018-01-08 PROCEDURE — 84100 ASSAY OF PHOSPHORUS: CPT

## 2018-01-08 PROCEDURE — G8980 MOBILITY D/C STATUS: HCPCS | Mod: CL

## 2018-01-08 PROCEDURE — 99239 HOSP IP/OBS DSCHRG MGMT >30: CPT | Mod: ,,, | Performed by: HOSPITALIST

## 2018-01-08 PROCEDURE — G8979 MOBILITY GOAL STATUS: HCPCS | Mod: CL

## 2018-01-08 PROCEDURE — G8978 MOBILITY CURRENT STATUS: HCPCS | Mod: CL

## 2018-01-08 PROCEDURE — 25000003 PHARM REV CODE 250: Performed by: STUDENT IN AN ORGANIZED HEALTH CARE EDUCATION/TRAINING PROGRAM

## 2018-01-08 PROCEDURE — 80048 BASIC METABOLIC PNL TOTAL CA: CPT

## 2018-01-08 PROCEDURE — 97165 OT EVAL LOW COMPLEX 30 MIN: CPT

## 2018-01-08 PROCEDURE — 36415 COLL VENOUS BLD VENIPUNCTURE: CPT

## 2018-01-08 PROCEDURE — 85025 COMPLETE CBC W/AUTO DIFF WBC: CPT

## 2018-01-08 PROCEDURE — 97161 PT EVAL LOW COMPLEX 20 MIN: CPT

## 2018-01-08 RX ORDER — CEPHALEXIN 500 MG/1
500 CAPSULE ORAL EVERY 12 HOURS
Qty: 10 CAPSULE | Refills: 0 | Status: SHIPPED | OUTPATIENT
Start: 2018-01-08 | End: 2018-01-13

## 2018-01-08 RX ORDER — CEPHALEXIN 500 MG/1
500 CAPSULE ORAL EVERY 12 HOURS
Status: DISCONTINUED | OUTPATIENT
Start: 2018-01-08 | End: 2018-01-08 | Stop reason: HOSPADM

## 2018-01-08 RX ORDER — HYDROCHLOROTHIAZIDE 25 MG/1
25 TABLET ORAL DAILY
Qty: 30 TABLET | Refills: 0 | Status: SHIPPED | OUTPATIENT
Start: 2018-01-08 | End: 2018-05-14 | Stop reason: SDUPTHER

## 2018-01-08 RX ADMIN — STANDARDIZED SENNA CONCENTRATE AND DOCUSATE SODIUM 2 TABLET: 8.6; 5 TABLET, FILM COATED ORAL at 09:01

## 2018-01-08 RX ADMIN — ASPIRIN 81 MG CHEWABLE TABLET 81 MG: 81 TABLET CHEWABLE at 09:01

## 2018-01-08 RX ADMIN — CLOPIDOGREL 75 MG: 75 TABLET, FILM COATED ORAL at 09:01

## 2018-01-08 RX ADMIN — CEPHALEXIN 500 MG: 500 CAPSULE ORAL at 11:01

## 2018-01-08 RX ADMIN — FLUCONAZOLE 200 MG: 100 TABLET ORAL at 09:01

## 2018-01-08 RX ADMIN — MICONAZOLE NITRATE: 20 CREAM TOPICAL at 09:01

## 2018-01-08 NOTE — PT/OT/SLP EVAL
Occupational Therapy   Evaluation    Name: Sherin Ortiz  MRN: [id number]  Admitting Diagnosis:  Sepsis      Recommendations:     Discharge Recommendations: home health OT  Discharge Equipment Recommendations:  none  Barriers to discharge:  None    History:     Occupational Profile:  Living Environment: Pt lives in a H with her bedbound sister whom she cares for. Walk-in shower with chair is present.  Previous level of function: Mod (I) for ADLs/IADLs from w/c level. Stand/pivot t/fs to chair and toilet. W/C for mobility.  Roles and Routines: Caregiver  Equipment Owned:  power chair, shower chair, grab bar, 3-in-1 commode, raised toilet, walker, rolling  Assistance upon Discharge: Son (lives across street) na daughter.    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    Benign essential hypertension 11/14/2012    Cataract     s/p phacoemulsification    Chronic kidney disease (CKD), stage III (moderate) 9/12/2013    Diabetic peripheral neuropathy associated with type 2 diabetes mellitus 11/14/2014    causing right hemiparesis    Gait disorder     Hyperlipidemia     Iritis - Both Eyes 6/10/2013    Kidney stone     Lymphedema     Morbid obesity with BMI of 40.0-44.9, adult 2/18/2015    Nephrolithiasis 4/20/2016    NS (nuclear sclerosis) 4/1/2013    Nuclear sclerosis - Both Eyes 4/29/2013    Preseptal cellulitis - Right Eye 4/29/2013    Proliferative diabetic retinopathy - Both Eyes 4/29/2013    Proliferative diabetic retinopathy, both eyes 4/1/2013    PSC (posterior subcapsular cataract) - Both Eyes 4/29/2013    S/P hernia repair 12/19/2012    TIA (transient ischemic attack) 11/18/2014    Tinea pedis 7/24/2012    Tinea pedis is present on both feet.     Type 2 diabetes mellitus with renal manifestations, controlled 12/12/2013    Type 2 diabetes, controlled, with moderate nonproliferative diabetic retinopathy without macular edema 9/17/2015     Ulcer of left lower extremity, limited to breakdown of skin 7/8/2015    Unspecified cerebral artery occlusion with cerebral infarction 11/16/2014    Unspecified venous (peripheral) insufficiency     Ureteral stone with hydronephrosis 1/27/2016    UTI (lower urinary tract infection)     Vaginal infection     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       Past Surgical History:   Procedure Laterality Date    APPENDECTOMY      CATARACT EXTRACTION W/  INTRAOCULAR LENS IMPLANT Left 5/21/2013    CATARACT EXTRACTION W/  INTRAOCULAR LENS IMPLANT Right 6/4/2013    CHOLECYSTECTOMY      COLONOSCOPY  12/22/2005    normal    ESOPHAGOGASTRODUODENOSCOPY  12/21/2015    hiatal hernia, Schatzki ring    EYE SURGERY Bilateral 2008    laser surgery both eyes    NASAL SEPTUM SURGERY      SUBTOTAL COLECTOMY  12/13/2012    transverse colon, for incarcerated umbilical hernia, Dr. Kat Bower       Subjective     Pain/Comfort:  · Pain Rating 1: 0/10    Objective:     Patient found with: peripheral IV    General Precautions: Standard, fall   Orthopedic Precautions:    Braces:       Occupational Performance:    Bed Mobility:    · Patient completed Rolling/Turning to Right with stand by assistance  · Patient completed Scooting/Bridging with stand by assistance  · Patient completed Supine to Sit with contact guard assistance  · Patient completed Sit to Supine with minimum assistance    Functional Mobility/Transfers:  · Patient completed Sit <> Stand Transfer with maximal assistance  with  no assistive device    · Declined attempting chair t/f.    Cognitive/Visual Perceptual:  Cognitive/Psychosocial Skills:     -       Oriented to: Person, Place, Time and Situation   -       Safety awareness/insight to disability: intact     Physical Exam:  Balance:    -       static sit balance = Good; Standing balance Poor  Upper Extremity Range of Motion:     -       Right Upper Extremity: WNL  -       Left Upper Extremity: Deficits:  "Reports possible torn rotator cuff so shld AROM limited to ~ 90*. Has difficulty getting hand behind head.  Upper Extremity Strength:    -       Right Upper Extremity: WNL  -       Left Upper Extremity: Shld NT due to pain;  WFL    Patient left supine with all lines intact and call button in reach    [facility] 6 Click:  [facility] Total Score: 16    Treatment & Education:  UE ROM/MMT  Bed mobility training / assessment  Functional mobility assessment  Sit/standing balance assessment  Discussed OT POC / Post-acute plan  Education:    Assessment:     Sherin Ortiz is a 73 y.o. female with a medical diagnosis of Sepsis.  She presents with decreased (I) in multiple ADL areas, functional mobility & t/fs as well as decreased overall strength, ROM, endurance and balance. Pt reports being (I) with basic t/fs but required increased assistance this session. Pt would benefit from skilled OT services as well as HHOT to address these deficits and to facilitate improving (I) with daily tasks.    Performance deficits affecting function are weakness, impaired endurance, impaired functional mobilty, impaired self care skills.      Rehab Prognosis:  Good; patient would benefit from acute skilled OT services to address these deficits and reach maximum level of function.         Clinical Decision Makin.  OT Low:  "Pt evaluation falls under low complexity for evaluation coding due to performance deficits noted in 1-3 areas as stated above and 0 co-morbities affecting current functional status. Data obtained from problem focused assessments. No modifications or assistance was required for completion of evaluation. Only brief occupational profile and history review completed."     Plan:     Patient to be seen 2 x/week to address the above listed problems via self-care/home management, therapeutic activities, therapeutic exercises  · Plan of Care Expires: 18  · Plan of Care Reviewed with: patient    This Plan of care has been " discussed with the patient who was involved in its development and understands and is in agreement with the identified goals and treatment plan    GOALS:    Occupational Therapy Goals        Problem: Occupational Therapy Goal    Goal Priority Disciplines Outcome Interventions   Occupational Therapy Goal     OT, PT/OT Ongoing (interventions implemented as appropriate)    Description:  Goals to be met by: 1/15/18    Patient will increase functional independence with ADLs by performing:    UE Dressing with Set-up Assistance.  LE Dressing with Supervision and Assistive Devices as needed.  Rolling to Bilateral with Supervision.   Supine to sit with Supervision.  Stand pivot transfers with Minimal Assistance.  Toilet transfer to toilet with Minimal Assistance.                      Time Tracking:     OT Date of Treatment: 01/08/18  OT Start Time: 1034  OT Stop Time: 1051  OT Total Time (min): 17 min    Billable Minutes:Evaluation 17    AGUILAR Ruggiero  1/8/2018

## 2018-01-08 NOTE — PT/OT/SLP EVAL
"Physical Therapy Evaluation    Patient Name:  Sherin Ortiz   MRN:  [id number]    Recommendations:     Discharge Recommendations:  home health PT (home safety eval)   Discharge Equipment Recommendations: none   Barriers to discharge: Decreased caregiver support    Assessment:     Sherin Ortiz is a 73 y.o. female admitted with a medical diagnosis of Sepsis.  She presents with the following impairments/functional limitations:  weakness, impaired endurance, impaired self care skills, impaired functional mobilty.  Pt tolerated session well with no complaints stated. Pt reports feeling able to go home and return to PLOF. No concerns regarding any weakness that may limit her transfers upon d/c.   Pt demonstrated good bed mobility and transfers this visit.  Pt would benefit from continued skilled physical therapy for the listed impairments to improve functional independence and overall safety with mobility prior to d/c. PT recommends d/c disposition to home with HHPT for home eval only; (pt may refuse).       Education:  Education provided to pt regarding: PT role/POC. Verbalized understanding.     Whiteboard updated with correct mobility information. RN/PCT notified.  Transfer with therapy only at this time.         Rehab Prognosis:  good; patient would benefit from acute skilled PT services to address these deficits and reach maximum level of function.      Recent Surgery: * No surgery found *      Plan:     During this hospitalization, patient to be seen 2 x/week to address the above listed problems via therapeutic activities, therapeutic exercises, neuromuscular re-education  · Plan of Care Expires:  02/07/18   Plan of Care Reviewed with: patient    Subjective     Communicated with RN prior to session.  Patient found supine, upon PT entry to room, agreeable to evaluation.      "I'll be fine when I go home."   Patient comments/goals: to go home and return to PLOF  Pain/Comfort:  None stated    Patients cultural, " spiritual, Mormonism conflicts given the current situation: none stated    Living Environment:  Lives with: sister  Lives in: [facility]; no DES  PLOF/Level of assist: PTA, pt's primary mobility is MOD (I) with power chair and transfers. Pt was able to perform own t/f's. (I) with all ADLs. Pt was primary caregiver for sister, but reports having 24/7 care present now to assist. Pt's son lives across the street and daughter is able to assist as needed  Bath: walk-in shower; seat present  DME: RW; power chair; shower chair    Roles/responsibilities: sister, mother, friend    Assist available upon d/c: family able to assist as needed      Objective:     Patient found with: telemetry, PureWick, peripheral IV     General Precautions: Standard, fall, diabetic   Orthopedic Precautions:N/A   Braces: N/A     Exams:  Pt oriented x Person, Place, Time .     Communication: clear/fluent    Follows Commands: Follows multistep  commands    Posture: Rounded shoulder and Head forward  Skin Integrity: Visible skin intact  Edema:  None noted      Range of Motion and Strength Examination    Right Lower Extremity Range of Motion: WFL    Left Lower Extremity Range of Motion: WFL    Demonstrated at least 3+/5 strength grossly bilateral LE's; limited due to tolerance.       Fine Motor Coordination:   intact      Gross Motor Coordination:  WFL      Functional Mobility:  Bed Mobility:  Rolling: Independent   Supine to Sit:  Contact Guard Assistance HOB elevated  Sit to supine: Minimal Assistance assist with LE; increase time to complete  Scooting: Stand-by Assistance towards EOB; and supine towards HOB; increase time to complete    Transfers:   Sit to stand:Total A (Mod A x2 ppl) with B HHA from EOB unable to tolerate standing for more than a few seconds       Gait:   Not appropriate this date.        Balance:  Static Sitting: Supervision or Set-up Assistance   Dynamic Sitting: Supervision or Set-up Assistance   Static Standing: Moderate Assistance    Dynamic Standing: Activity did not occur         AM-PAC 6 CLICK MOBILITY  Total Score:12       Patient left supine with all lines intact and call button in reach.    GOALS:    Physical Therapy Goals        Problem: Physical Therapy Goal    Goal Priority Disciplines Outcome Goal Variances Interventions   Physical Therapy Goal     PT/OT, PT      Description:  Goals to be met by: 1/15/18     Patient will increase functional independence with mobility by performin. Supine to sit with Modified [place]  2. Sit to supine with Modified [place]  3. Sit to stand transfer with Moderate Assistance using Rolling Walker.   4. Lower extremity exercise program x15 reps with supervision to improve strength and endurance with functional activities.                       History:     Past Medical History:   Diagnosis Date    Allergy     Asteroid hyalosis - Left Eye 2013    Benign essential hypertension 2012    Cataract     s/p phacoemulsification    Chronic kidney disease (CKD), stage III (moderate) 2013    Diabetic peripheral neuropathy associated with type 2 diabetes mellitus 2014    causing right hemiparesis    Gait disorder     Hyperlipidemia     Iritis - Both Eyes 6/10/2013    Kidney stone     Lymphedema     Morbid obesity with BMI of 40.0-44.9, adult 2015    Nephrolithiasis 2016    NS (nuclear sclerosis) 2013    Nuclear sclerosis - Both Eyes 2013    Preseptal cellulitis - Right Eye 2013    Proliferative diabetic retinopathy - Both Eyes 2013    Proliferative diabetic retinopathy, both eyes 2013    PSC (posterior subcapsular cataract) - Both Eyes 2013    S/P hernia repair 2012    TIA (transient ischemic attack) 2014    Tinea pedis 2012    Tinea pedis is present on both feet.    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 Kat Bower       Clinical Decision Making:     Personal factors/comorbidities: DM2; UTI; CKD: HLD: obesity. The listed co-morbidities and personal factors impact pt's current level and progress with functional mobility and independence.   Body systems elements affected: lower extremities, upper extremities, musculoskeletal system, cardiovascular, integumentary system  Impairments: see assessment   Clinical Presentation: stable  Functional Outcome Tools: AMPAC, MMT, ROM  Evaluation Complexity Level: low    Time Tracking:     PT Received On: 01/08/18  PT Start Time: 1034     PT Stop Time: 1051  PT Total Time (min): 17 min     Billable Minutes: Evaluation 17      Yesi Day PT, DPT  Pager: [phone]  1/8/2018

## 2018-01-08 NOTE — ASSESSMENT & PLAN NOTE
Patient with history of recurrent UTI in the past  -Urine gram stain and culture negative  -Received 1g ceftriaxone in the ED  -Rocephin x1, then switched to Keflex for 5 days  -Going home with keflex

## 2018-01-08 NOTE — ASSESSMENT & PLAN NOTE
Chronic venous insufficiency of BLE  -Continue prior wound care orders, which was to change dressings every Monday and Thursday--> last changed today on admission 01/06/2017  -Wound care   -going home with wound care/HH

## 2018-01-08 NOTE — PLAN OF CARE
Problem: Occupational Therapy Goal  Goal: Occupational Therapy Goal  Goals to be met by: 1/15/18    Patient will increase functional independence with ADLs by performing:    UE Dressing with Set-up Assistance.  LE Dressing with Supervision and Assistive Devices as needed.  Rolling to Bilateral with Supervision.   Supine to sit with Supervision.  Stand pivot transfers with Contact Guard Assistance.  Toilet transfer to toilet with Contact Guard Assistance.    Outcome: Ongoing (interventions implemented as appropriate)  Evaluation completed and POC established.    AGUILAR Chowdhury

## 2018-01-08 NOTE — PROGRESS NOTES
Discharge instructions went over with pt. Pt verbalizes understanding and has no new questions at this time. VSS. IV removed, catheter intact. Pt awaiting pt escort service. Will continue to monitor.

## 2018-01-08 NOTE — PLAN OF CARE
Plan of care went over with patient at beginning of shift: frequent checks,  frequent v/s, cbg checks wnl, turning often, Purewick for bladder relief, healing of her yeast rash on abdomen, rest and sleep, pain relief, tele-monitoring and IV maintenance.     Pt rested well all night. Purewick was emptied this am; total output on night shift 600's clear yellow urine. No c/o pain all night. Pt has diabetic wounds to her right and left lower leg. An order was placed for wound caRE. Will continue to monitor.

## 2018-01-08 NOTE — PLAN OF CARE
Pt previous will [facility]. SW sent home health referral to [facility] via [facility]

## 2018-01-08 NOTE — PLAN OF CARE
Problem: Patient Care Overview  Goal: Plan of Care Review  Outcome: Ongoing (interventions implemented as appropriate)  Pt remained free from injury and falls on today. Pt shows no signs of distress.  ate % of food today,Blood glucose remained WNL.Pt repositioned frequently using pillow wedge, kerlix & ace wrap still present w/o drainage

## 2018-01-08 NOTE — PLAN OF CARE
[facility]    HOME HEALTH ORDERS  FACE TO FACE ENCOUNTER    Patient Name: Sherin Ortiz  YOB: 1945    PCP: Ame Vasquez MD   PCP Address: [address] / [place] [address]  PCP Phone Number: [phone]  PCP Fax: [phone]    Encounter Date: 01/08/2018    Admit to Home Health    Diagnoses:  Active Hospital Problems    Diagnosis  POA    *Sepsis [A41.9]  Yes    Urinary tract infection with hematuria [N39.0, R31.9]  Yes    Uncontrolled type 2 diabetes mellitus with stage 3 chronic kidney disease, with long-term current use of insulin [E11.22, E11.65, N18.3, Z79.4]  Not Applicable     Chronic    Dermatitis, stasis [I87.2]  Yes     Chronic    Chronic venous hypertension with ulcer involving both sides [I87.313]  Yes     Chronic    Morbid obesity with BMI of 40.0-44.9, adult [E66.01, Z68.41]  Not Applicable    Weakness [R53.1]  Yes    Hypothermia [T68.XXXA]  Yes    Stage 3 chronic kidney disease [N18.3]  Yes     Chronic    Essential hypertension [I10]  Yes     Chronic    Hyperlipidemia LDL goal <100 [E78.5]  Yes     Chronic      Resolved Hospital Problems    Diagnosis Date Resolved POA    Type 2 diabetes, controlled, with neuropathy [E11.40] 11/29/2015 Yes       Future Appointments  Date Time Provider Department Center   1/9/2018 11:30 AM Ame Vasquez MD [facility]   1/9/2018 12:30 PM LAB, APPOINTMENT [facility] IM [facility] PCW   1/17/2018 1:00 PM ARTUR Hawk [facility] IMPRICL [facility] PCW   1/26/2018 2:20 PM Akosua Alvarenga NP [facility] WOUND [facility]   1/30/2018 3:30 PM Cynthia Choi MD [facility] NEPHRO [facility]     Follow-up Information     ARTUR Rios.    Specialty:  Internal Medicine  Why:  Hospital Follow-up with [facility]. Please arrive 15 minites before your scheduled appointment time. Thanks.   Contact information:  [address]  [place]  [address]  [phone]                     I have seen and examined this patient face to face today. My clinical findings that support the need for the home health skilled services and home bound status are the following:  Weakness/numbness causing balance and gait disturbance due to Weakness/Debility making it taxing to leave home.  Requiring assistive device to leave home due to unsteady gait caused by  Weakness/Debility.    Allergies:  Review of patient's allergies indicates:   Allergen Reactions    Penicillins Hives     Other reaction(s): Hives    Sulfa (sulfonamide antibiotics) Other (See Comments)     Eyad, pt states her doctor told her the shakes were possibly caused by an allergy to sulfa       Diet: diabetic diet: 2000 calorie    Activities: activity as tolerated    Nursing:   SN to complete comprehensive assessment including routine vital signs. Instruct on disease process and s/s of complications to report to MD. Review/verify medication list sent home with the patient at time of discharge  and instruct patient/caregiver as needed. Frequency may be adjusted depending on start of care date.    Notify MD if SBP > 160 or < 90; DBP > 90 or < 50; HR > 120 or < 50; Temp > 101      CONSULTS:    Physical Therapy to evaluate and treat. Evaluate for home safety and equipment needs; Establish/upgrade home exercise program. Perform / instruct on therapeutic exercises, gait training, transfer training, and Range of Motion.  Occupational Therapy to evaluate and treat. Evaluate home environment for safety and equipment needs. Perform/Instruct on transfers, ADL training, ROM, and therapeutic exercises.    WOUND CARE ORDERS  yes:  Vascular Wound:  Location: BLE     Consult ET nurse        Apply the following to wound: Change dressing of BLE twice a week.     Medications: Review discharge medications with patient and family and provide education.      Current Discharge Medication List      START taking these medications  "   Details   cephALEXin (KEFLEX) 500 MG capsule Take 1 capsule (500 mg total) by mouth every 12 (twelve) hours.  Qty: 10 capsule, Refills: 0         CONTINUE these medications which have CHANGED    Details   hydroCHLOROthiazide (HYDRODIURIL) 25 MG tablet Take 1 tablet (25 mg total) by mouth once daily.  Qty: 30 tablet, Refills: 0         CONTINUE these medications which have NOT CHANGED    Details   aspirin 81 MG Chew Take 81 mg by mouth once daily.        atorvastatin (LIPITOR) 40 MG tablet TAKE 1 TABLET EVERY EVENING  Qty: 90 tablet, Refills: 3      clopidogrel (PLAVIX) 75 mg tablet TAKE 1 TABLET ONE TIME DAILY  Qty: 90 tablet, Refills: 3      ergocalciferol (ERGOCALCIFEROL) 50,000 unit Cap Take 1 capsule (50,000 Units total) by mouth every 7 days.  Qty: 12 capsule, Refills: 3      fluconazole (DIFLUCAN) 200 MG Tab Take 1 tablet (200 mg total) by mouth once daily.  Qty: 7 tablet, Refills: 0      glimepiride (AMARYL) 1 MG tablet Take 2 tablets (2 mg total) by mouth with lunch.  Qty: 180 tablet, Refills: 3      LANTUS 100 unit/mL injection INJECT 7 TO 10 UNITS SUBCUTANEOUSLY IN THE EVENING DEPENDING ON SCALE (DISCARD EACH VIAL AFTER 28 DAYS)  Qty: 30 mL, Refills: 3      ACCU-CHEK RAMIRO PLUS METER [facility]       ACCU-CHEK RAMIRO PLUS TEST STRP Strp TEST TWICE DAILY  Qty: 200 strip, Refills: 3      ACCU-CHEK SOFTCLIX LANCETS [facility]       BD INSULIN SYRINGE ULTRA-FINE 1/2 mL 30 gauge x 1/2" Syrg USE EVERY NIGHT  Qty: 90 each, Refills: 3      diclofenac sodium 1 % Gel Apply 2 g topically nightly as needed.  Qty: 1 Tube, Refills: 0    Associated Diagnoses: Stage 3 chronic kidney disease due to type 2 diabetes mellitus; Uncontrolled type 2 diabetes mellitus with stage 3 chronic kidney disease, with long-term current use of insulin; Right shoulder pain, unspecified chronicity      econazole nitrate 1 % cream Apply 1 application topically once daily. Apply to affected area  Qty: 85 g, Refills: 2    Associated Diagnoses: Tinea " pedis of both feet      triamcinolone acetonide 0.1% (KENALOG) 0.1 % cream APPLY TOPICALLY TO BOTH LEGS Q MONDAY AND FRIDAY  Refills: 1         STOP taking these medications       metroNIDAZOLE (FLAGYL) 250 MG tablet Comments:   Reason for Stopping:               I certify that this patient is confined to her home and needs intermittent skilled nursing care, physical therapy and occupational therapy.

## 2018-01-08 NOTE — PROGRESS NOTES
Wound care consult received for BLE wounds.  Pt treated by outpatient wound care NP and was last seen on Friday 1/5.  Yumiko Alvarenga NP applied unna boot compression wraps to BLE with orders for 2x week dressing changes therefore the next dressing change would be tomorrow.  Noted last DISHA was in 2015.  Spoke with medicine team to recommend updated DISHA.  Pt is planning for discharge later this afternoon and will follow up with outpt wound care and continue with home health for wound dressing changes.  [id number]

## 2018-01-08 NOTE — ASSESSMENT & PLAN NOTE
Reportedly 92.3F today at home, 95F on admission, most recently 97.6  Hernesto Hugger PRN  Improved with Hernesto hugger   Hypothermia likely secondary to a combination of age, dysautonomia (DM II neuropathy, chronic ischemic changes on MRI), recurrent infection along with poor skin condition and atrophy of muscles from prolonged immobility given pt is wheelchair bound for 25 yrs.

## 2018-01-08 NOTE — DISCHARGE SUMMARY
"[facility] Hospital Medicine  Discharge Summary      Patient Name: Sherin Ortiz  MRN: [id number]  Admission Date: 1/6/2018  Hospital Length of Stay: 2 days  Discharge Date and Time:  01/08/2018 3:10 PM  Attending Physician: Jesika Carlos MD   Discharging Provider: Vinay Oquendo MD  Primary Care Provider: Ame Vasquez MD  Hospital Medicine [place]: [facility] Vinay Oquendo MD    HPI:   Sherin Ortiz is a 73 y.o. 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 Her oral temperature at home today was reportedly 92.3F. Does endorse recent development of BUE and abdominal rash for which she was started on fluconazole per daughter. Also endorsing fatigue and back pain though these are chronic as well. She denies chest pain, SOB, URI symptoms, abdominal pain, n/v, diarrhea, dysuria, headache, lightheadedness. She denies any sick contacts.    Per chart review, patient "presented [facility]17 with weakness, fatigue, and chills x 2 days.  Again she reported temperature as low as 93F at home.  Denies dysuria, endorses poor appetite and oral intake.  She was prescribed ciprofloxacin 500 mg BID by her PCP on 12/26/17 for presumed UTI with no improvement in symptoms.  Upon arrival to ED patient with WBC count 3.54, temperature 93.8F, lactate 1.2, BUN 29, creatinine 2.1; U/A with 30 WBCs, few bacteria, and 1+ leukocytes.  She was given ceftriaxone and placed on Behr hugger to elevate temperature.  Admitted to Hospital Medicine's service for observation of LOREN."      Also per chart review from recent admission, patient "has been dependent on a motorized wheelchair since her 50s.  She lives in [place] and receives her outpatient medical care at [facility].  Her primary care physician is Dr. Ame Vasquez.  Her nephrologist is " "Dr. Cynthia Choi.  She is followed by NP Wiliam Mccoy in Endocrinology clinic.  She is followed by NP Akosua Alvarenga in wound care clinic for bilateral leg wounds.  She gets home health for wound care.  She has a penicillin allergy but has tolerated cephalosporins.  She typically gets hypothermia and leukopenia when she has urinary tract infections (she was hospitalized at [facility] from 11/6/14-11/9/14 for evaluation of this, with no other etiology found other than a UTI), and again at [facility] 12/13/17-12/15/17 with UTI and hypothermia.  Urine cultures grew pan-sensitive E. Coli, patient was discharged home to complete cefdinir.  PT/OT were consulted and recommended SNF placement, patient declined and opted for  PT/OT with home health."    * No surgery found *      Hospital Course:   01/08: Patient on Hernesto cherry, has been normothermic for most of her stay in the hospital. Was given a dose of cefepime in the ED. Rocephin while on the floor for two days for possible UTI. However, ucx and UA were negative. Given recurrent UTI and previous history of multi organisms on ucx, will send home with keflex for additional five days. Patient hypothermia is most likely due to a combination of old age, dysautonomia given history of multiple CV risk factors and poorly controlled DM II with neuropathy, chronic ischemic changes on MRI. Discharge home with PT/OT and wound care.      Consults:     Vitals:    01/08/18 1135   BP: 132/60   Pulse: 73   Resp: 20   Temp: 97.6 °F (36.4 °C)     Physical Exam   Constitutional: She is oriented to person, place, and time. She appears well-developed and well-nourished.   HENT:   Head: Normocephalic and atraumatic.   Eyes: Conjunctivae, EOM and lids are normal.   Neck: Phonation normal. No edema present.   Cardiovascular: Normal rate, regular rhythm and normal heart sounds.    No murmur heard.  Pulmonary/Chest: Effort normal and breath sounds normal. " She has no wheezes. She has no rales.   Abdominal: Soft. Normal appearance and bowel sounds are normal. She exhibits no distension. There is no hepatosplenomegaly. There is no tenderness.   Neurological: She is alert and oriented to person, place, and time.   Skin: Skin is warm, dry and intact.    Psychiatric: She has a normal mood and affect. Her speech is normal and behavior is normal.     Urinary tract infection with hematuria    Patient with history of recurrent UTI in the past  -Urine gram stain and culture negative  -Received 1g ceftriaxone in the ED  -Rocephin x1, then switched to Keflex for 5 days  -Going home with keflex      Chronic venous hypertension with ulcer involving both sides    Chronic venous insufficiency of BLE  -Continue prior wound care orders, which was to change dressings every Monday and Thursday--> last changed today on admission 01/06/2017  -Wound care   -going home with wound care/HH       Dermatitis, stasis    Chronic.  Continue antifungal cream BID.      Hypothermia    Reportedly 92.3F today at home, 95F on admission, most recently 97.6  Hernesto Hugger PRN  Improved with Hernesto hugger   Hypothermia likely secondary to a combination of age, dysautonomia (DM II neuropathy, chronic ischemic changes on MRI), recurrent infection along with poor skin condition and atrophy of muscles from prolonged immobility given pt is wheelchair bound for 23 yrs.        Stage 3 chronic kidney disease    Stage 3 CKD due to type 2 DM  BUN/creatinine 27/1.6 which appears to be patients baseline  -S/p 1L NS in ED  -Avoid nephrotoxins.   -Will continue to monitor with daily BMP      Essential hypertension    BP with one aberrant reading of SBP 220s, otherwise 107-150s  -Holding lisinopril and HCTZ as patient has been normotensive. She reportedly has had one of these medications discontinued recently though she cannot recall which one.   -Continue to monitor.   -C/W HCTZ       Hyperlipidemia LDL goal <100    Continue  Statin         Final Active Diagnoses:    Diagnosis Date Noted POA    PRINCIPAL PROBLEM:  Sepsis [A41.9] 11/29/2015 Yes    Urinary tract infection with hematuria [N39.0, R31.9] 01/06/2018 Yes    Uncontrolled type 2 diabetes mellitus with stage 3 chronic kidney disease, with long-term current use of insulin [E11.22, E11.65, N18.3, Z79.4] 11/08/2016 Not Applicable     Chronic    Dermatitis, stasis [I87.2] 10/19/2016 Yes     Chronic    Chronic venous hypertension with ulcer involving both sides [I87.313] 10/19/2016 Yes     Chronic    Morbid obesity with BMI of 40.0-44.9, adult [E66.01, Z68.41] 02/18/2015 Not Applicable    Weakness [R53.1] 11/17/2014 Yes    Hypothermia [T68.XXXA] 01/16/2014 Yes    Stage 3 chronic kidney disease [N18.3] 09/12/2013 Yes     Chronic    Essential hypertension [I10] 11/14/2012 Yes     Chronic    Hyperlipidemia LDL goal <100 [E78.5] 08/14/2012 Yes     Chronic      Problems Resolved During this Admission:    Diagnosis Date Noted Date Resolved POA    Type 2 diabetes, controlled, with neuropathy [E11.40] 02/21/2014 11/29/2015 Yes       Discharged Condition: stable    Disposition: Home or Self Care    Follow Up:  Follow-up Information     ARTUR Rios.    Specialty:  Internal Medicine  Why:  Hospital Follow-up with [facility]. Please arrive 15 minites before your scheduled appointment time. Thanks.   Contact information:  [address] [place] [address] [id number] [phone]                 Patient Instructions:     Ambulatory referral to Outpatient Case Management   Referral Priority: Routine Referral Type: Consultation   Referral Reason: Specialty Services Required    Number of Visits Requested: 1      Activity as tolerated     Notify your health care provider if you experience any of the following:  temperature >100.4     Notify your health care provider if you experience any of the following:  redness,  tenderness, or signs of infection (pain, swelling, redness, odor or green/yellow discharge around incision site)     Notify your health care provider if you experience any of the following:  increased confusion or weakness         Significant Diagnostic Studies: Labs:   CMP   Recent Labs  Lab 01/06/18  1548 01/07/18 0412 01/08/18  0434    142 140   K 4.9 4.3 4.6    110 108   CO2 26 24 24   * 102 129*   BUN 27* 23 25*   CREATININE 1.6* 1.4 1.6*   CALCIUM 9.7 9.0 9.1   PROT 7.2  --   --    ALBUMIN 2.6*  --   --    BILITOT 0.4  --   --    ALKPHOS 148*  --   --    AST 24  --   --    ALT 26  --   --    ANIONGAP 8 8 8   ESTGFRAFRICA 36.1* 42.4* 36.1*   EGFRNONAA 31.3* 36.8* 31.3*    and CBC   Recent Labs  Lab 01/06/18  1548 01/07/18 0412 01/08/18  0434   WBC 4.12 3.72* 4.05   HGB 11.1* 9.9* 10.1*   HCT 34.1* 30.6* 31.0*    184 195       Pending Diagnostic Studies:     None         Medications:  Reconciled Home Medications:   Current Discharge Medication List      START taking these medications    Details   cephALEXin (KEFLEX) 500 MG capsule Take 1 capsule (500 mg total) by mouth every 12 (twelve) hours.  Qty: 10 capsule, Refills: 0         CONTINUE these medications which have CHANGED    Details   hydroCHLOROthiazide (HYDRODIURIL) 25 MG tablet Take 1 tablet (25 mg total) by mouth once daily.  Qty: 30 tablet, Refills: 0         CONTINUE these medications which have NOT CHANGED    Details   aspirin 81 MG Chew Take 81 mg by mouth once daily.        atorvastatin (LIPITOR) 40 MG tablet TAKE 1 TABLET EVERY EVENING  Qty: 90 tablet, Refills: 3      clopidogrel (PLAVIX) 75 mg tablet TAKE 1 TABLET ONE TIME DAILY  Qty: 90 tablet, Refills: 3      ergocalciferol (ERGOCALCIFEROL) 50,000 unit Cap Take 1 capsule (50,000 Units total) by mouth every 7 days.  Qty: 12 capsule, Refills: 3      fluconazole (DIFLUCAN) 200 MG Tab Take 1 tablet (200 mg total) by mouth once daily.  Qty: 7 tablet, Refills: 0     "  glimepiride (AMARYL) 1 MG tablet Take 2 tablets (2 mg total) by mouth with lunch.  Qty: 180 tablet, Refills: 3      LANTUS 100 unit/mL injection INJECT 7 TO 10 UNITS SUBCUTANEOUSLY IN THE EVENING DEPENDING ON SCALE (DISCARD EACH VIAL AFTER 28 DAYS)  Qty: 30 mL, Refills: 3      ACCU-CHEK RAMIRO PLUS METER Misc       ACCU-CHEK RAMIRO PLUS TEST STRP Strp TEST TWICE DAILY  Qty: 200 strip, Refills: 3      ACCU-CHEK SOFTCLIX LANCETS [facility]       BD INSULIN SYRINGE ULTRA-FINE 1/2 mL 30 gauge x 1/2" Syrg USE EVERY NIGHT  Qty: 90 each, Refills: 3      diclofenac sodium 1 % Gel Apply 2 g topically nightly as needed.  Qty: 1 Tube, Refills: 0    Associated Diagnoses: Stage 3 chronic kidney disease due to type 2 diabetes mellitus; Uncontrolled type 2 diabetes mellitus with stage 3 chronic kidney disease, with long-term current use of insulin; Right shoulder pain, unspecified chronicity      econazole nitrate 1 % cream Apply 1 application topically once daily. Apply to affected area  Qty: 85 g, Refills: 2    Associated Diagnoses: Tinea pedis of both feet      triamcinolone acetonide 0.1% (KENALOG) 0.1 % cream APPLY TOPICALLY TO BOTH LEGS Q MONDAY AND FRIDAY  Refills: 1         STOP taking these medications       metroNIDAZOLE (FLAGYL) 250 MG tablet Comments:   Reason for Stopping:               Indwelling Lines/Drains at time of discharge:   Lines/Drains/Airways     Drain            Female External Urinary Catheter 01/06/18 2100 1 day          Pressure Ulcer                 Pressure Ulcer Left heel Stage III -- days         Pressure Ulcer Right heel Stage III -- days         Pressure Ulcer 09/17/15 2221 Right heel Stage  days                Time spent on the discharge of patient: 20 minutes  Patient was seen and examined on the date of discharge and determined to be suitable for discharge.         Vinay Oquendo MD  Department of Hospital Medicine  [facility]  "

## 2018-01-08 NOTE — ASSESSMENT & PLAN NOTE
BP with one aberrant reading of SBP 220s, otherwise 107-150s  -Holding lisinopril and HCTZ as patient has been normotensive. She reportedly has had one of these medications discontinued recently though she cannot recall which one.   -Continue to monitor.   -C/W HCTZ

## 2018-01-08 NOTE — PLAN OF CARE
Extended Emergency Contact Information  Primary Emergency Contact: Jazmyn Blanchard   [place]  Mobile Phone: [phone]  Relation: Daughter  Secondary Emergency Contact: Hayley Velasco   [place]  Mobile Phone: [phone]  Relation: Daughter    Ame Vasquez MD  [address] / [place] [address]    Future Appointments  Date Time Provider Department Center   1/9/2018 11:30 AM Ame Vasquez MD [facility] [address] [facility]   1/9/2018 12:30 PM LAB, APPOINTMENT [facility] [address] [facility]   1/17/2018 1:00 PM Hyun Mackey, ANAYELIP [facility]PRIC[facility] [address] [facility]   1/26/2018 2:20 PM Akosua Alvarenga NP [facility] WOUND [address]   1/30/2018 3:30 PM Cynthia Choi MD [facility] NEPHRO [address]     Payor: [employer] MANAGED MEDICARE / Plan: [employer] MEDICARE HMO / Product Type: Capitation /       [facility] [address] [place] [address] MILY MIDDLETON  [address] AT [facility]  [address]  [place] [address]  Phone: [phone] Fax: [phone]    [facility] - [facility] [address] [facility]  [address] [facility] [address]  Phone: [phone] Fax: [phone]       01/08/18 1458   Discharge Assessment   Assessment Type Discharge Planning Assessment   Confirmed/corrected address and phone number on facesheet? Yes   Assessment information obtained from? Patient;Medical Record   Expected Length of Stay (days) 2   Communicated expected length of stay with patient/caregiver yes   Prior to hospitilization cognitive status: Alert/Oriented   Prior to hospitalization functional status: Assistive Equipment   Current cognitive status: Alert/Oriented   Current Functional Status: Assistive Equipment   Lives With sibling(s)   Able to Return to Prior Arrangements yes   Is patient able to care for self after discharge? Yes   Patient's perception of discharge disposition home or selfcare;home health   Readmission Within The Last 30 Days no previous admission in last  30 days   Patient currently being followed by outpatient case management? No   Patient currently receives any other outside agency services? No   Equipment Currently Used at Home power chair;shower chair;grab bar;3-in-1 commode;raised toilet   Do you have any problems affording any of your prescribed medications? No   Is the patient taking medications as prescribed? yes   Does the patient have transportation home? Yes   Transportation Available family or friend will provide   Does the patient receive services at the [facility]madin Clinic? No   Discharge Plan A Home with family;Home Health   Discharge Plan B Home with family   Patient/Family In Agreement With Plan yes   Readmission Questionnaire   Have you felt down, depressed, or hopeless? 1   Have you felt little interest or pleasure in doing things? 1

## 2018-01-08 NOTE — PLAN OF CARE
Problem: Physical Therapy Goal  Goal: Physical Therapy Goal  Goals to be met by: 1/15/18     Patient will increase functional independence with mobility by performin. Supine to sit with Modified [place]  2. Sit to supine with Modified [place]  3. Sit to stand transfer with Moderate Assistance using Rolling Walker.   4. Lower extremity exercise program x15 reps with supervision to improve strength and endurance with functional activities.           PT evaluation completed. POC and goals established.    Yesi Day, PT, DPT  2018

## 2018-01-08 NOTE — HOSPITAL COURSE
01/08: Patient on Hernesto hugger, has been normothermic for most of her stay in the hospital. Was given a dose of cefepime in the ED. Rocephin while on the floor for two days for possible UTI. However, ucx and UA were negative. Given recurrent UTI and previous history of multi organisms on ucx, will send home with keflex for additional five days. Patient hypothermia is most likely due to a combination of old age, dysautonomia given history of multiple CV risk factors and poorly controlled DM II with neuropathy, chronic ischemic changes on MRI. Discharge home with PT/OT and wound care.

## 2018-01-09 ENCOUNTER — TELEPHONE (OUTPATIENT)
Dept: ADMINISTRATIVE | Facility: CLINIC | Age: 73
End: 2018-01-09

## 2018-01-09 NOTE — PT/OT/SLP DISCHARGE
Physical Therapy Discharge Summary    Name: Sherin Ortiz  MRN: [id number]   Principal Problem: Sepsis     Patient Discharged from acute Physical Therapy on 18.  Please refer to prior PT noted date on 18 for functional status.     Assessment:     Patient was discharged unexpectedly.  Information required to complete an accurate discharge summary is unknown.  Refer to therapy initial evaluation and last progress note for initial and most recent functional status and goal achievement.  Recommendations made may be found in medical record. Patient has not met goals.    Objective:     GOALS:    Physical Therapy Goals     Not on file          Multidisciplinary Problems (Resolved)        Problem: Physical Therapy Goal    Goal Priority Disciplines Outcome Goal Variances Interventions   Physical Therapy Goal   (Resolved)     PT/OT, PT Outcome(s) achieved     Description:  Goals to be met by: 1/15/18     Patient will increase functional independence with mobility by performin. Supine to sit with Modified [place]  2. Sit to supine with Modified [place]  3. Sit to stand transfer with Moderate Assistance using Rolling Walker.   4. Lower extremity exercise program x15 reps with supervision to improve strength and endurance with functional activities.                       Reasons for Discontinuation of Therapy Services  Transfer to alternate level of care.      Plan:     Patient Discharged to: Home with Home Health Service.    Yesi Day, PT  2018

## 2018-01-10 ENCOUNTER — NURSE TRIAGE (OUTPATIENT)
Dept: ADMINISTRATIVE | Facility: CLINIC | Age: 73
End: 2018-01-10

## 2018-01-10 ENCOUNTER — OUTPATIENT CASE MANAGEMENT (OUTPATIENT)
Dept: ADMINISTRATIVE | Facility: OTHER | Age: 73
End: 2018-01-10

## 2018-01-10 ENCOUNTER — TELEPHONE (OUTPATIENT)
Dept: ADMINISTRATIVE | Facility: CLINIC | Age: 73
End: 2018-01-10

## 2018-01-10 ENCOUNTER — PATIENT OUTREACH (OUTPATIENT)
Dept: ADMINISTRATIVE | Facility: CLINIC | Age: 73
End: 2018-01-10

## 2018-01-10 PROBLEM — E83.42 HYPOMAGNESEMIA: Status: RESOLVED | Noted: 2017-12-31 | Resolved: 2018-01-10

## 2018-01-10 NOTE — PROGRESS NOTES
Please note the following patient has been assigned to Yumiko Brunner RN in Outpatient Case Management for Diabetes Disease Management with a hbA1C greater than 10.0.    Other Diagneses on Referral:  Acute cystitis with hematuria    Please contact [facility] [phone] [phone] with any questions.    Thank you,    Wendie Maxwell

## 2018-01-10 NOTE — TELEPHONE ENCOUNTER
"    Reason for Disposition   [1] Widespread rash AND [2] cause unknown   Mild widespread rash    Answer Assessment - Initial Assessment Questions  1. APPEARANCE of RASH: "Describe the rash." (e.g., spots, blisters, raised areas, skin peeling, scaly)      "just a rash, roughness on skin"  2. SIZE: "How big are the spots?" (e.g., tip of pen, eraser, coin; inches, centimeters)      unsure  3. LOCATION: "Where is the rash located?"      Right upper thigh, side, arms  4. COLOR: "What color is the rash?" (Note: It is difficult to assess rash color in people with darker-colored skin. When this situation occurs, simply ask the caller to describe what they see.)      unsure  5. ONSET: "When did the rash begin?"      Before going into hospital  6. FEVER: "Do you have a fever?" If so, ask: "What is your temperature, how was it measured, and when did it start?"      no  7. ITCHING: "Does the rash itch?" If so, ask: "How bad is the itch?" (Scale 1-10; or mild, moderate, severe)      Not anymore  8. CAUSE: "What do you think is causing the rash?"      unsure  9. MEDICATION FACTORS: "Have you started any new medications within the last 2 weeks?" (e.g., antibiotics)       yes  10. OTHER SYMPTOMS: "Do you have any other symptoms?" (e.g., dizziness, headache, sore throat, joint pain)        no  11. PREGNANCY: "Is there any chance you are pregnant?" "When was your last menstrual period?"        no    Protocols used: ST RASH - GUIDELINE [id number], ST RASH OR REDNESS - WIDESPREAD-A-      "

## 2018-01-10 NOTE — TELEPHONE ENCOUNTER
"Pt c\o rash to arms, right upper thigh, and right side. Think it may have started before being admitted to hospital. States a Dr gave her a med "with a little PCN in it." Currently on Cephalexin. States she thinks it may be getting better now as she can't she it on her arms much anymore and it's not itching as much. Pt not able to describe rash well; however does state doesn't look like hives she has had in past with PCN reaction. She refuses disposition. Instructed if she worsens, I.e. Rash worsens, fever, SOB, itching returns seek medical care. Message routed to pcp.  "

## 2018-01-10 NOTE — DISCHARGE SUMMARY
[facility] Hospital Medicine  Discharge Summary      Patient Name: Sherin Ortiz  MRN: [id number]  Admission Date: 12/29/2017  Hospital Length of Stay: 0 days  Discharge Date and Time: 12/31/2017  4:51 PM  Attending Physician: Humza Glover MD   Discharging Provider: Humza Glover MD  Primary Care Provider: Ame Vasquez MD      HPI:   Sherin Ortiz is a 72 y.o.  woman with morbid obesity, hypertension, hyperlipidemia, diabetes mellitus type 2 (on insulin therapy) with retinopathy, neuropathy, and chronic kidney disease stage 3, nephrolithiasis, chronic venous hypertension with leg edema, bilateral tinea pedis, frequent UTIs, and chronic pyuria and bacteriuria with positive nitrite, even when she is not ill.  She has been dependent on a motorized wheelchair since her 50s.  She lives in [place] and receives her outpatient medical care at [facility].  Her primary care physician is Dr. Ame Vasquez.  Her nephrologist is Dr. Cynthia Choi.  She is followed by NP Wiliam Mccoy in Endocrinology clinic.  She is followed by NP Akosua Alvarenga in wound care clinic for bilateral leg wounds.  She gets home health for wound care.  She has a penicillin allergy but has tolerated cephalosporins.  She typically gets hypothermia and leukopenia when she has urinary tract infections (she was hospitalized at [facility] from 11/6/14-11/9/14 for evaluation of this, with no other etiology found other than a UTI), and again at [facility] 12/13/17-12/15/17 with UTI and hypothermia.  Urine cultures grew pan-sensitive E. Coli, patient was discharged home to complete cefdinir.  PT/OT were consulted and recommended SNF placement, patient declined and opted for  PT/OT with home health.    Presented to [facility] 12/29/17 with weakness, fatigue, and chills x 2 days.  Again she reported temperature as low as 93F at home.  Denies dysuria, endorses poor  appetite and oral intake.  She was prescribed ciprofloxacin 500 mg BID by her PCP on 12/26/17 for presumed UTI with no improvement in symptoms.  Upon arrival to ED patient with WBC count 3.54, temperature 93.8F, lactate 1.2, BUN 29, creatinine 2.1; U/A with 30 WBCs, few bacteria, and 1+ leukocytes.  She was given ceftriaxone and placed on Behr hugger to elevate temperature.  Admitted to Hospital Medicine's service for observation of LOREN.     * No surgery found *      Hospital Course:   Started on IV fluids and antibiotics. She felt better with the fluids and her creatinine decreased from 2.1 to 1.6 on day of discharge. She was tolerating her diet without any nausea or vomiting. Her urine culture showed Candida and she was prescribed a course of fluconazole as she came in as if she had a infection with hypothermia and a low WBC count. She declined SNF placement again and wanted to go home with  and her previously arranged private caregivers.      Consults:     Essential hypertension    BP is controlled off her home HCTZ and lisinopril, so these weill be held on discharge for now.           Final Active Diagnoses:    Diagnosis Date Noted POA    Recurrent UTI [N39.0] 12/14/2017 Yes    Uncontrolled type 2 diabetes mellitus with stage 3 chronic kidney disease, with long-term current use of insulin [E11.22, E11.65, N18.3, Z79.4] 11/08/2016 Not Applicable     Chronic    Chronic venous hypertension with ulcer involving both sides [I87.313] 10/19/2016 Yes     Chronic    Venous insufficiency of leg [I87.2] 04/12/2016 Yes     Chronic    Wheelchair dependent [Z99.3] 11/29/2015 Not Applicable     Chronic    Diabetic peripheral neuropathy associated with type 2 diabetes mellitus [E11.42] 09/18/2015 Yes     Chronic    Morbid obesity with BMI of 40.0-44.9, adult [E66.01, Z68.41] 02/18/2015 Not Applicable    Stage 3 chronic kidney disease [N18.3] 09/12/2013 Yes     Chronic    Essential hypertension [I10] 11/14/2012 Yes      Chronic    Hyperlipidemia LDL goal <100 [E78.5] 08/14/2012 Yes     Chronic    Tinea pedis [B35.3] 07/24/2012 Yes     Chronic      Problems Resolved During this Admission:    Diagnosis Date Noted Date Resolved POA    PRINCIPAL PROBLEM:  LOREN (acute kidney injury) [N17.9] 09/18/2015 01/10/2018 Yes    Hypomagnesemia [E83.42] 12/31/2017 01/10/2018 No    Bacteriuria with pyuria [N39.0] 12/13/2017 01/05/2018 Yes     Chronic    Hypothermia [T68.XXXA] 11/06/2014 12/31/2017 Yes       Discharged Condition: good    Disposition: [place]Health Care [facility]    Follow Up:  Follow-up Information     Ame Vasquez MD. Schedule an appointment as soon as possible for a visit in 3 weeks.    Specialty:  Internal Medicine  Contact information:  [address]  [facility] [id number] [phone]             [facility].    Specialty:  Home Health Services  Why:  Home Health  Contact information:  [address]  [address]  [facility] [address] [phone]                 Patient Instructions:     Diet Diabetic 2000 Calories     Activity as tolerated     Notify your health care provider if you experience any of the following:  temperature >100.4     Notify your health care provider if you experience any of the following:  persistent nausea and vomiting or diarrhea     Notify your health care provider if you experience any of the following:  redness, tenderness, or signs of infection (pain, swelling, redness, odor or green/yellow discharge around incision site)     Notify your health care provider if you experience any of the following:  difficulty breathing or increased cough     Notify your health care provider if you experience any of the following:  persistent dizziness, light-headedness, or visual disturbances     Notify your health care provider if you experience any of the following:  worsening rash         Significant Diagnostic Studies: Radiology:   Imaging Results    None         Pending Diagnostic  "Studies:     None         Medications:  Reconciled Home Medications:   Discharge Medication List as of 12/31/2017  3:55 PM      START taking these medications    Details   fluconazole (DIFLUCAN) 200 MG Tab Take 1 tablet (200 mg total) by mouth once daily., Starting Mon 1/1/2018, Until Mon 1/8/2018, Normal         CONTINUE these medications which have NOT CHANGED    Details   ACCU-CHEK RAMIRO PLUS METER Misc Starting 9/29/2015, Until Discontinued, Historical Med      ACCU-CHEK RAMIRO PLUS TEST STRP Strp TEST TWICE DAILY, Normal      ACCU-CHEK SOFTCLIX LANCETS Misc Starting 9/29/2015, Until Discontinued, Historical Med      aspirin 81 MG Chew Take 81 mg by mouth once daily.  , Until Discontinued, Historical Med      atorvastatin (LIPITOR) 40 MG tablet TAKE 1 TABLET EVERY EVENING, Normal      BD INSULIN SYRINGE ULTRA-FINE 1/2 mL 30 gauge x 1/2" Syrg USE EVERY NIGHT, Normal      clopidogrel (PLAVIX) 75 mg tablet TAKE 1 TABLET ONE TIME DAILY, Normal      diclofenac sodium 1 % Gel Apply 2 g topically nightly as needed., Starting Mon 10/9/2017, Normal      econazole nitrate 1 % cream Apply 1 application topically once daily. Apply to affected area, Starting 1/5/2017, Until Discontinued, Normal      ergocalciferol (ERGOCALCIFEROL) 50,000 unit Cap Take 1 capsule (50,000 Units total) by mouth every 7 days., Starting Wed 7/26/2017, Normal      glimepiride (AMARYL) 1 MG tablet Take 2 tablets (2 mg total) by mouth with lunch., Starting 3/8/2017, Until Discontinued, Normal      LANTUS 100 unit/mL injection INJECT 7 TO 10 UNITS SUBCUTANEOUSLY IN THE EVENING DEPENDING ON SCALE (DISCARD EACH VIAL AFTER 28 DAYS), Normal      triamcinolone acetonide 0.1% (KENALOG) 0.1 % cream APPLY TOPICALLY TO BOTH LEGS Q MONDAY AND FRIDAY, Historical Med      hydrochlorothiazide (HYDRODIURIL) 25 MG tablet Take 1 tablet (25 mg total) by mouth once daily., Starting Wed 7/26/2017, Until Mon 10/9/2017, Normal         STOP taking these medications       " ciprofloxacin HCl (CIPRO) 500 MG tablet Comments:   Reason for Stopping:         lisinopril 10 MG tablet Comments:   Reason for Stopping:               Indwelling Lines/Drains at time of discharge:   Lines/Drains/Airways     Pressure Ulcer                 Pressure Ulcer Left heel Stage III -- days         Pressure Ulcer Right heel Stage III -- days         Pressure Ulcer 09/17/15 2221 Right heel Stage  days                Time spent on the discharge of patient: 35 minutes  Patient was seen and examined on the date of discharge and determined to be suitable for discharge.         Humza Glover MD  Department of Hospital Medicine  [facility]

## 2018-01-10 NOTE — HOSPITAL COURSE
Started on IV fluids and antibiotics. She felt better with the fluids and her creatinine decreased from 2.1 to 1.6 on day of discharge. She was tolerating her diet without any nausea or vomiting. Her urine culture showed Candida and she was prescribed a course of fluconazole as she came in as if she had a infection with hypothermia and a low WBC count. She declined SNF placement again and wanted to go home with  and her previously arranged private caregivers.

## 2018-01-10 NOTE — PATIENT INSTRUCTIONS

## 2018-01-10 NOTE — TELEPHONE ENCOUNTER
I told the patient that I would suggest stopping the cephalexin as it is likely that if she is allergic to pen she could be allergic to cephalexin. Patient understood.

## 2018-01-11 ENCOUNTER — OUTPATIENT CASE MANAGEMENT (OUTPATIENT)
Dept: ADMINISTRATIVE | Facility: OTHER | Age: 73
End: 2018-01-11

## 2018-01-11 LAB
BACTERIA BLD CULT: NORMAL
BACTERIA BLD CULT: NORMAL

## 2018-01-11 NOTE — PROGRESS NOTES
1/11/2018  Referral received for enrollment in disease management.  Review of EMR, review of problem list Pt. Does qualify for enrollment in disease management program.  Pt. Is a 72 yo female with a PMH that includes Type 2 DM with A1C of 9.9 , DM peripheral neuropathy, posterior subcapsular cataract both eyes, moderate non proliferative diabetic retinopathy, chronic venous HTN, ulcer of toe, ulcer of lower limb,hyperlipidemia, HTN, venous insufficiency, dermatitis, peripheral arterial occlusive disease, CKD stage 3, recurrent UTI, sepsis, morbid obisity with BMI of 40.0-44.9, hyperparathyroidism, hypotermia, wheelchair dependent, bilateral leg edema, weakness, inability to walk, hypoalbuminemia.   Patient was admitted to the hospital on 1/6-1/8 dx UTI, discharged to home with HH, PT and OT.  Pt. Reports the only visits she is receiving is HH SN 2x weekly for wound care to LE wounds.  Pt. Had declined PT?OT visits.  Pt. Is followed by [facility].  Pt. Reports that she lives with her sister whom is bed bound.  Pt. Reports that she does not walk but sits in her motorized wheelchair.  Pt. States while in the hospital a BSC was ordered however it was too large and she sent it back, patient is requesting a standard size BSC.  Will look into.   Pt. Reports that she is able to get in/out of her walk in shower, patient states that she is fairly independent of ADL's, and assists with caring for her sister.  Pt. Reports that her 26yo grandson helps with care daily, and also a neighbor that assists as well.  Pt. Is able to prepare meals, and family does the shopping.  Pt. Reports using the bus for transportation to appointments.   Discussed [employer] transportation benefit with patient, she was not aware.  Agreeable to receiving information re transportation service.  Pt. Reports having a glucometer and monitoring her sugars in the am and pm.  Pt. Reports that her morning glucose is between 80 -130 usually and around 200 in the evening.   Encouraged patient to monitor glucose 2x daily and keep log.  Pt. Reports attending a DM class recently, and is not interested in another class at this time.  Pt. Is agreeable to receiving DM educational material from me will review on follow up calls.  Pt. Is unable to do med rec at this time, will attempt on follow up call.  Pt. States that she manages her own medications, uses a pill box to organize them, and denies any difficulty remembering to take her medicines.  [facility], denies any difficulty paying for medicines at this time.  Pt. Offers no other/needs or concerns at the time of this assessment, is agreeable to follow up call next week.  Will send business card with contact information, reviewed office hours with patient, encouraged her to call with any needs.      Follow up Plan:    Medication reconciliation  Diet review  jose Castellano RN

## 2018-01-17 ENCOUNTER — OUTPATIENT CASE MANAGEMENT (OUTPATIENT)
Dept: ADMINISTRATIVE | Facility: OTHER | Age: 73
End: 2018-01-17

## 2018-01-17 NOTE — PROGRESS NOTES
1/17/2018  Good morning. My name is Yumiko, I work for [facility]s Outpatient case management department with Dr. Vasquez. I wanted to call and review your disaster plan with you. I also wanted to go over some crucial information and provide you with emergency phone numbers for your [place].   [] Called patient, no answer, I left a message with the phone number for the [place] Office of Emergency Preparedness.   [x] Please be sure to have a supply of water, non-perishable food items, flashlights and batteries with you in your house.   [x] Please be sure you bring all of your medications with you. Bring at least a five day supply. It is best to bring your medication bottles, in case you are displaced for a longer period of time, therefore you can get your medication refilled from your temporary location.   [x] Please bring sure to bring any DME that you may need. This includes walkers, wheelchairs, shower chairs, nebulizer machine, etc.   [x] If you are a diabetic- be sure to bring your glucometer and all glucometer monitoring supplies.   [] If you have high blood pressure- be sure to bring your blood pressure cuff so you can continue to monitor.   [] If you have CHF-be sure to bring your scale so you can continue to monitor.  [] If on PEG feeding- be sure to bring tube feedings and feeding supplies.  [] If on oxygen- be sure to bring all of your oxygen supplies: Cannula, portable tanks, concentrator, etc. Also contact you Oxygen Supply Company to find out the nearest location of an oxygen supply company to where you will be located. ([employer]: [phone], [facility]: [phone], [facility]:[phone], [employer]: [phone]).   [] If you receive hemodialysis- you should already have a plan in place with your dialysis center. If you do not know where you need to evacuate to in order to be close to a dialysis center- reach out to your dialysis center for further direction. ([facility]  line: [phone], [facility] service line [phone])  [] If receiving treatment at an infusion center- please contact the infusion center to find out which infusion center they have a contract with. Also ask your infusion center for location of the infusion center they are in contract with, so if need be you can evacuate to that area.   Office of Emergency Preparedness Phone number:  [x] [facility]: [phone]  [] [facility]: [phone]  [] [place]: [phone]  [] [place] [facility]: [phone]  [] [place] [facility]: [phone]  [] [facility]: [phone]  [] [facility]: [phone]  [] [facility]: [phone]  [] [facility]: [phone]  [] [facility]: [phone]  [] [facility]: [phone]   [] [place] [facility]: [phone]  [] [facility]: [phone]    [] [facility]:  [phone]   [] [facility]:  [phone]   [] [facility]:  [phone]   [] [facility]:  [phone]   [] [facility]:  [phone]   [] [facility]: [phone]   [] [facility]:  [phone]   [] [facility]:  [phone]   [] [facility]:  [phone]   [] [facility]:  [phone]   [] [facility]:  [phone]   [] [facility]:  [phone]   [] [facility]:  [phone]   [] [facility]:  [phone]   [] [facility]:  [phone]   [] [facility]: [phone]   [] [facility]:  [phone]   [] [facility]: [phone]   [] [facility]: [phone]    Emergency preparedness call completed with patient, she states no issues today.  Family present with her.  States she has power, heat and water all working.  Does not plan to evacuate.  YRN Castellano

## 2018-01-20 RX ORDER — BLOOD SUGAR DIAGNOSTIC
STRIP MISCELLANEOUS
Qty: 200 STRIP | Refills: 3 | Status: SHIPPED | OUTPATIENT
Start: 2018-01-20 | End: 2019-01-21 | Stop reason: SDUPTHER

## 2018-01-21 ENCOUNTER — HOSPITAL ENCOUNTER (INPATIENT)
Facility: HOSPITAL | Age: 73
LOS: 2 days | Discharge: HOME HEALTH | DRG: 690 | End: 2018-01-23
Attending: EMERGENCY MEDICINE | Admitting: EMERGENCY MEDICINE
Payer: MEDICARE

## 2018-01-21 DIAGNOSIS — T68.XXXA HYPOTHERMIA, INITIAL ENCOUNTER: Primary | ICD-10-CM

## 2018-01-21 LAB
ALBUMIN SERPL BCP-MCNC: 3 G/DL
ALP SERPL-CCNC: 170 U/L
ALT SERPL W/O P-5'-P-CCNC: 37 U/L
ANION GAP SERPL CALC-SCNC: 10 MMOL/L
AST SERPL-CCNC: 29 U/L
BACTERIA #/AREA URNS AUTO: ABNORMAL /HPF
BASOPHILS # BLD AUTO: 0.02 K/UL
BASOPHILS NFR BLD: 0.6 %
BILIRUB SERPL-MCNC: 0.3 MG/DL
BILIRUB UR QL STRIP: NEGATIVE
BNP SERPL-MCNC: 137 PG/ML
BUN SERPL-MCNC: 34 MG/DL
CALCIUM SERPL-MCNC: 9.9 MG/DL
CHLORIDE SERPL-SCNC: 106 MMOL/L
CLARITY UR REFRACT.AUTO: ABNORMAL
CO2 SERPL-SCNC: 24 MMOL/L
COLOR UR AUTO: YELLOW
CORTIS SERPL-MCNC: 13.8 UG/DL
CREAT SERPL-MCNC: 1.6 MG/DL
DIFFERENTIAL METHOD: ABNORMAL
EOSINOPHIL # BLD AUTO: 0 K/UL
EOSINOPHIL NFR BLD: 0.9 %
ERYTHROCYTE [DISTWIDTH] IN BLOOD BY AUTOMATED COUNT: 14.6 %
EST. GFR  (AFRICAN AMERICAN): 36.1 ML/MIN/1.73 M^2
EST. GFR  (NON AFRICAN AMERICAN): 31.3 ML/MIN/1.73 M^2
GLUCOSE SERPL-MCNC: 242 MG/DL
GLUCOSE UR QL STRIP: ABNORMAL
HCT VFR BLD AUTO: 36.3 %
HGB BLD-MCNC: 12 G/DL
HGB UR QL STRIP: NEGATIVE
HYALINE CASTS UR QL AUTO: 23 /LPF
IMM GRANULOCYTES # BLD AUTO: 0.01 K/UL
IMM GRANULOCYTES NFR BLD AUTO: 0.3 %
KETONES UR QL STRIP: NEGATIVE
LACTATE SERPL-SCNC: 1.1 MMOL/L
LEUKOCYTE ESTERASE UR QL STRIP: ABNORMAL
LYMPHOCYTES # BLD AUTO: 0.9 K/UL
LYMPHOCYTES NFR BLD: 27.4 %
MAGNESIUM SERPL-MCNC: 1.8 MG/DL
MCH RBC QN AUTO: 29.6 PG
MCHC RBC AUTO-ENTMCNC: 33.1 G/DL
MCV RBC AUTO: 90 FL
MICROSCOPIC COMMENT: ABNORMAL
MONOCYTES # BLD AUTO: 0.2 K/UL
MONOCYTES NFR BLD: 7.3 %
NEUTROPHILS # BLD AUTO: 2.1 K/UL
NEUTROPHILS NFR BLD: 63.5 %
NITRITE UR QL STRIP: NEGATIVE
NRBC BLD-RTO: 0 /100 WBC
PH UR STRIP: 5 [PH] (ref 5–8)
PLATELET # BLD AUTO: 194 K/UL
PMV BLD AUTO: 10.2 FL
POTASSIUM SERPL-SCNC: 5.1 MMOL/L
PROCALCITONIN SERPL IA-MCNC: <0.02 NG/ML
PROT SERPL-MCNC: 8.2 G/DL
PROT UR QL STRIP: ABNORMAL
RBC # BLD AUTO: 4.05 M/UL
RBC #/AREA URNS AUTO: 1 /HPF (ref 0–4)
SODIUM SERPL-SCNC: 140 MMOL/L
SP GR UR STRIP: 1.02 (ref 1–1.03)
SQUAMOUS #/AREA URNS AUTO: 0 /HPF
TSH SERPL DL<=0.005 MIU/L-ACNC: 2.59 UIU/ML
URN SPEC COLLECT METH UR: ABNORMAL
UROBILINOGEN UR STRIP-ACNC: NEGATIVE EU/DL
WBC # BLD AUTO: 3.29 K/UL
WBC #/AREA URNS AUTO: 40 /HPF (ref 0–5)
WBC CLUMPS UR QL AUTO: ABNORMAL
YEAST UR QL AUTO: ABNORMAL

## 2018-01-21 PROCEDURE — 87086 URINE CULTURE/COLONY COUNT: CPT

## 2018-01-21 PROCEDURE — 87040 BLOOD CULTURE FOR BACTERIA: CPT

## 2018-01-21 PROCEDURE — 83880 ASSAY OF NATRIURETIC PEPTIDE: CPT

## 2018-01-21 PROCEDURE — 93005 ELECTROCARDIOGRAM TRACING: CPT

## 2018-01-21 PROCEDURE — 83735 ASSAY OF MAGNESIUM: CPT

## 2018-01-21 PROCEDURE — 99284 EMERGENCY DEPT VISIT MOD MDM: CPT | Mod: ,,, | Performed by: EMERGENCY MEDICINE

## 2018-01-21 PROCEDURE — 80053 COMPREHEN METABOLIC PANEL: CPT

## 2018-01-21 PROCEDURE — 63600175 PHARM REV CODE 636 W HCPCS: Performed by: EMERGENCY MEDICINE

## 2018-01-21 PROCEDURE — 94761 N-INVAS EAR/PLS OXIMETRY MLT: CPT

## 2018-01-21 PROCEDURE — 84443 ASSAY THYROID STIM HORMONE: CPT

## 2018-01-21 PROCEDURE — 83605 ASSAY OF LACTIC ACID: CPT

## 2018-01-21 PROCEDURE — 93010 ELECTROCARDIOGRAM REPORT: CPT | Mod: ,,, | Performed by: INTERNAL MEDICINE

## 2018-01-21 PROCEDURE — 96374 THER/PROPH/DIAG INJ IV PUSH: CPT

## 2018-01-21 PROCEDURE — 81001 URINALYSIS AUTO W/SCOPE: CPT

## 2018-01-21 PROCEDURE — 85025 COMPLETE CBC W/AUTO DIFF WBC: CPT

## 2018-01-21 PROCEDURE — 84145 PROCALCITONIN (PCT): CPT

## 2018-01-21 PROCEDURE — 12000002 HC ACUTE/MED SURGE SEMI-PRIVATE ROOM

## 2018-01-21 PROCEDURE — 20600001 HC STEP DOWN PRIVATE ROOM

## 2018-01-21 PROCEDURE — 82533 TOTAL CORTISOL: CPT

## 2018-01-21 PROCEDURE — 99284 EMERGENCY DEPT VISIT MOD MDM: CPT | Mod: 25

## 2018-01-21 RX ORDER — ATORVASTATIN CALCIUM 10 MG/1
40 TABLET, FILM COATED ORAL NIGHTLY
Status: DISCONTINUED | OUTPATIENT
Start: 2018-01-21 | End: 2018-01-23 | Stop reason: HOSPADM

## 2018-01-21 RX ORDER — AMOXICILLIN 250 MG
2 CAPSULE ORAL DAILY
Status: DISCONTINUED | OUTPATIENT
Start: 2018-01-22 | End: 2018-01-23 | Stop reason: HOSPADM

## 2018-01-21 RX ORDER — ENOXAPARIN SODIUM 100 MG/ML
30 INJECTION SUBCUTANEOUS EVERY 24 HOURS
Status: DISCONTINUED | OUTPATIENT
Start: 2018-01-21 | End: 2018-01-22

## 2018-01-21 RX ORDER — NAPROXEN SODIUM 220 MG/1
81 TABLET, FILM COATED ORAL DAILY
Status: DISCONTINUED | OUTPATIENT
Start: 2018-01-22 | End: 2018-01-23 | Stop reason: HOSPADM

## 2018-01-21 RX ORDER — RAMELTEON 8 MG/1
8 TABLET ORAL NIGHTLY PRN
Status: DISCONTINUED | OUTPATIENT
Start: 2018-01-21 | End: 2018-01-23 | Stop reason: HOSPADM

## 2018-01-21 RX ORDER — TRIAMCINOLONE ACETONIDE 1 MG/G
CREAM TOPICAL
Status: DISCONTINUED | OUTPATIENT
Start: 2018-01-22 | End: 2018-01-23 | Stop reason: HOSPADM

## 2018-01-21 RX ORDER — SODIUM CHLORIDE 0.9 % (FLUSH) 0.9 %
5 SYRINGE (ML) INJECTION
Status: DISCONTINUED | OUTPATIENT
Start: 2018-01-21 | End: 2018-01-23 | Stop reason: HOSPADM

## 2018-01-21 RX ORDER — IBUPROFEN 200 MG
16 TABLET ORAL
Status: DISCONTINUED | OUTPATIENT
Start: 2018-01-21 | End: 2018-01-23 | Stop reason: HOSPADM

## 2018-01-21 RX ORDER — INSULIN ASPART 100 [IU]/ML
0-5 INJECTION, SOLUTION INTRAVENOUS; SUBCUTANEOUS
Status: DISCONTINUED | OUTPATIENT
Start: 2018-01-21 | End: 2018-01-23 | Stop reason: HOSPADM

## 2018-01-21 RX ORDER — ONDANSETRON 8 MG/1
8 TABLET, ORALLY DISINTEGRATING ORAL EVERY 8 HOURS PRN
Status: DISCONTINUED | OUTPATIENT
Start: 2018-01-21 | End: 2018-01-23 | Stop reason: HOSPADM

## 2018-01-21 RX ORDER — GLUCAGON 1 MG
1 KIT INJECTION
Status: DISCONTINUED | OUTPATIENT
Start: 2018-01-21 | End: 2018-01-23 | Stop reason: HOSPADM

## 2018-01-21 RX ORDER — CLOPIDOGREL BISULFATE 75 MG/1
75 TABLET ORAL DAILY
Status: DISCONTINUED | OUTPATIENT
Start: 2018-01-22 | End: 2018-01-23 | Stop reason: HOSPADM

## 2018-01-21 RX ORDER — IBUPROFEN 200 MG
24 TABLET ORAL
Status: DISCONTINUED | OUTPATIENT
Start: 2018-01-21 | End: 2018-01-23 | Stop reason: HOSPADM

## 2018-01-21 RX ORDER — DOXYLAMINE SUCCINATE 25 MG
TABLET ORAL 2 TIMES DAILY
Status: DISCONTINUED | OUTPATIENT
Start: 2018-01-21 | End: 2018-01-23 | Stop reason: HOSPADM

## 2018-01-21 RX ORDER — ACETAMINOPHEN 325 MG/1
650 TABLET ORAL EVERY 6 HOURS PRN
Status: DISCONTINUED | OUTPATIENT
Start: 2018-01-21 | End: 2018-01-23 | Stop reason: HOSPADM

## 2018-01-21 RX ORDER — CEFTRIAXONE 1 G/1
1 INJECTION, POWDER, FOR SOLUTION INTRAMUSCULAR; INTRAVENOUS
Status: COMPLETED | OUTPATIENT
Start: 2018-01-21 | End: 2018-01-21

## 2018-01-21 RX ADMIN — CEFTRIAXONE SODIUM 1 G: 1 INJECTION, POWDER, FOR SOLUTION INTRAMUSCULAR; INTRAVENOUS at 05:01

## 2018-01-21 NOTE — ED PROVIDER NOTES
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    Benign essential hypertension 11/14/2012    Cataract     s/p phacoemulsification    Chronic kidney disease (CKD), stage III (moderate) 9/12/2013    Diabetic peripheral neuropathy associated with type 2 diabetes mellitus 11/14/2014    causing right hemiparesis    Gait disorder     Hyperlipidemia     Iritis - Both Eyes 6/10/2013    Kidney stone     Lymphedema     Morbid obesity with BMI of 40.0-44.9, adult 2/18/2015    Nephrolithiasis 4/20/2016    NS (nuclear sclerosis) 4/1/2013    Nuclear sclerosis - Both Eyes 4/29/2013    Preseptal cellulitis - Right Eye 4/29/2013    Proliferative diabetic retinopathy - Both Eyes 4/29/2013    Proliferative diabetic retinopathy, both eyes 4/1/2013    PSC (posterior subcapsular cataract) - Both Eyes 4/29/2013    S/P hernia repair 12/19/2012    TIA (transient ischemic attack) 11/18/2014    Tinea pedis 7/24/2012    Tinea pedis is present on both feet.    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 Kat Bower     Family History   Problem Relation Age of Onset    Diabetes Sister     Cataracts Sister     Heart disease Brother     Cataracts Brother     Leukemia Mother     Cancer Neg Hx     Amblyopia Neg Hx     Blindness Neg Hx     Glaucoma Neg Hx     Hypertension Neg Hx     Macular degeneration Neg Hx     Retinal detachment Neg Hx     Strabismus Neg Hx     Stroke Neg Hx     Thyroid disease Neg Hx     Kidney disease Neg Hx      Social History   Substance Use Topics    Smoking status: Former Smoker     Packs/day: 0.50     Years: 15.00     Types: Cigarettes     Quit date: 7/9/1982    Smokeless tobacco: Former User      Comment: smoked one pack per week    Alcohol use No     Review of Systems    Physical Exam     Initial Vitals [01/21/18 1635]   BP Pulse Resp Temp SpO2   (!) 160/81 75 17 (!) 92.9 °F (33.8 °C) 100 %      MAP       107.33         Physical Exam    ED Course   Procedures  Labs Reviewed   CULTURE, BLOOD   CULTURE, BLOOD   APTT   B-TYPE NATRIURETIC PEPTIDE   CBC W/ AUTO DIFFERENTIAL   COMPREHENSIVE METABOLIC PANEL   CORTISOL, RANDOM   LACTIC ACID, PLASMA   MAGNESIUM   PROTIME-INR   PROCALCITONIN   TSH   URINALYSIS, REFLEX TO URINE CULTURE   VITAMIN B1                               ED Course      Clinical Impression:   {Add your Clinical Impression here. If you haven't documented one  yet, please pend the note, finalize a Clinical Impression, and refresh your note before signing.:[id number]}

## 2018-01-21 NOTE — ED PROVIDER NOTES
Encounter Date: 1/21/2018    SCRIBE #1 NOTE: I, Jonathon Smith, am scribing for, and in the presence of,  Dr. Leonard. I have scribed the following portions of the note -       History     Chief Complaint   Patient presents with    Low Temperature     92.0 oral at home; per daughter, usually means pt has infection      Time patient was seen by the provider: 4:46 PM      The patient is a 73 y.o. female with co-morbidities including: UTI, HTN, CKD, TIA, morbid obesity, DM II who presents to the ED with a complaint of low temperature of 92 degrees while at home today. Pt just checked her temperature PTA and notes associated lower lip numbness. She denies weakness or any new onset of sx at this time. Of note, pt recently finished 5 day antibiotic regiment.           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    Benign essential hypertension 11/14/2012    Cataract     s/p phacoemulsification    Chronic kidney disease (CKD), stage III (moderate) 9/12/2013    Diabetic peripheral neuropathy associated with type 2 diabetes mellitus 11/14/2014    causing right hemiparesis    Gait disorder     Hyperlipidemia     Iritis - Both Eyes 6/10/2013    Kidney stone     Lymphedema     Morbid obesity with BMI of 40.0-44.9, adult 2/18/2015    Nephrolithiasis 4/20/2016    NS (nuclear sclerosis) 4/1/2013    Nuclear sclerosis - Both Eyes 4/29/2013    Preseptal cellulitis - Right Eye 4/29/2013    Proliferative diabetic retinopathy - Both Eyes 4/29/2013    Proliferative diabetic retinopathy, both eyes 4/1/2013    PSC (posterior subcapsular cataract) - Both Eyes 4/29/2013    S/P hernia repair 12/19/2012     TIA (transient ischemic attack) 11/18/2014    Tinea pedis 7/24/2012    Tinea pedis is present on both feet.    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 Kat Bower     Family History   Problem Relation Age of Onset    Diabetes Sister     Cataracts Sister     Heart disease Brother     Cataracts Brother     Leukemia Mother     Cancer Neg Hx     Amblyopia Neg Hx     Blindness Neg Hx     Glaucoma Neg Hx     Hypertension Neg Hx     Macular degeneration Neg Hx     Retinal detachment Neg Hx     Strabismus Neg Hx     Stroke Neg Hx     Thyroid disease Neg Hx     Kidney disease Neg Hx      Social History   Substance Use Topics    Smoking status: Former Smoker     Packs/day: 0.50     Years: 15.00     Types: Cigarettes     Quit date: 7/9/1982    Smokeless tobacco: Former User      Comment: smoked one pack per week    Alcohol use No     Review of Systems   Constitutional:         Temp of 92 at home   HENT: Negative for sore throat.    Eyes: Negative for visual disturbance.   Respiratory: Negative for shortness of breath.    Cardiovascular: Negative for chest pain.   Gastrointestinal: Negative for nausea.   Genitourinary: Negative for dysuria.   Musculoskeletal: Negative for back pain.   Skin: Negative for rash.   Neurological: Positive for numbness (lower lip). Negative for weakness.       Physical Exam     Initial Vitals [01/21/18 1635]   BP Pulse Resp Temp SpO2   (!) 160/81 75 17 (!) 92.9 °F (33.8 °C) 100 %      MAP       107.33         Physical Exam    Nursing note and vitals reviewed.  Constitutional: She appears well-developed and well-nourished. She is not diaphoretic. No distress.   HENT:   Head: Normocephalic and atraumatic.   Mouth/Throat: Oropharynx is clear and moist. Mucous membranes are not pale.   Eyes: Conjunctivae and EOM are normal.   Neck: Normal range of motion. Neck supple.   Cardiovascular: Normal rate and regular rhythm.   No murmur heard.  Pulmonary/Chest: Breath sounds normal. No respiratory distress.   Abdominal: Soft. She exhibits no distension. There is no tenderness. There is no guarding.   Musculoskeletal: Normal range of motion.   Neurological: She is alert and oriented to person, place, and time. She has normal strength. No cranial nerve deficit or sensory deficit.   Follows commands   Skin: Skin is warm and dry.   Lower extremities with wound care dressing in place         ED Course   Procedures  Labs Reviewed   B-TYPE NATRIURETIC PEPTIDE - Abnormal; Notable for the following:        Result Value     (*)     All other components within normal limits   CBC W/ AUTO DIFFERENTIAL - Abnormal; Notable for the following:     WBC 3.29 (*)     Hematocrit 36.3 (*)     RDW 14.6 (*)     Lymph # 0.9 (*)     Mono # 0.2 (*)     All other components within normal limits   COMPREHENSIVE METABOLIC PANEL - Abnormal; Notable for the following:     Glucose 242 (*)     BUN,  Bld 34 (*)     Creatinine 1.6 (*)     Albumin 3.0 (*)     Alkaline Phosphatase 170 (*)     eGFR if  36.1 (*)     eGFR if non  31.3 (*)     All other components within normal limits   URINALYSIS, REFLEX TO URINE CULTURE - Abnormal; Notable for the following:     Appearance, UA Hazy (*)     Protein, UA 1+ (*)     Glucose, UA 3+ (*)     Leukocytes, UA 2+ (*)     All other components within normal limits    Narrative:     Preferred Collection Type->Urine, Clean Catch   URINALYSIS MICROSCOPIC - Abnormal; Notable for the following:     WBC, UA 40 (*)     Hyaline Casts, UA 23 (*)     All other components within normal limits    Narrative:     Preferred Collection Type->Urine, Clean Catch   CULTURE, URINE   CORTISOL, RANDOM   LACTIC ACID, PLASMA   MAGNESIUM   PROCALCITONIN   TSH   APTT   PROTIME-INR   VITAMIN B1     EKG Readings: (Independently Interpreted)   Rhythm: Normal Sinus Rhythm. Conduction: Bifasicular (all of which are old).       X-Rays:   Independently Interpreted Readings:   Chest X-Ray: No infiltrates. No effusion.     Medical Decision Making:   History:   Old Medical Records: I decided to obtain old medical records.  Old Records Summarized: records from previous admission(s).       <> Summary of Records: Pt has frequent admissions for UTIs, hypothermia. She was most recently admitted on December 13, 2017 and December 29, 2017 and January 6, 2018. Pt was noted to have hypothermia, temperature of 92.3 and generalized weakness. She was treated empirically for a UTI. All of her cultures were negative. She was treated with a bear hugger for her hypothermia and it improved. Her last positive culture was from her December 13th admission in which her urine culture was positive for E.coli pan sensitive.   Initial Assessment:   74 yo f, PMH as above, now here for asx hypothermia, temp 92 x 1 day.  Pt checks temp frequently bc concerns about recent illnesses.  No infectious sx at all and  o/w HD stable, well-appearing.  Differential Diagnosis:   Hypothermia.  In the past this has been a sign of urosepsis for pt though most recent admit with negative cultures/infectious work-up.  ? Endocrine process (TSH normal) vs CNS dysfunction (autonomic dysfunction suggested in previous notes)  ED Management:  -repeat labs/sepsis work-up  -empiric tx with ceftriaxone 1 g IV  -likely admission  -bear hugger ordered            Scribe Attestation:   Scribe #1: I performed the above scribed service and the documentation accurately describes the services I performed. I attest to the accuracy of the note.            ED Course      Clinical Impression:   The encounter diagnosis was Hypothermia, initial encounter.    Disposition:   Disposition: Admitted    I, Dr. Yesi Leonard, personally performed the services described in this documentation. All medical record entries made by the scribe were at my direction and in my presence.  I have reviewed the chart and agree that the record reflects my personal performance and is accurate and complete. Yesi Leonard MD.  8:45 AM 01/22/2018                        Yesi Leonard MD  01/22/18 0845

## 2018-01-21 NOTE — ED TRIAGE NOTES
Sherin Ortiz, a 73 y.o. female presents to the ED c/o low body temperature.  Pt states that she knows she has a urine infection when her temp is low.       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    Benign essential hypertension 11/14/2012    Cataract     s/p phacoemulsification    Chronic kidney disease (CKD), stage III (moderate) 9/12/2013    Diabetic peripheral neuropathy associated with type 2 diabetes mellitus 11/14/2014    causing right hemiparesis    Gait disorder     Hyperlipidemia     Iritis - Both Eyes 6/10/2013    Kidney stone     Lymphedema     Morbid obesity with BMI of 40.0-44.9, adult 2/18/2015    Nephrolithiasis 4/20/2016    NS (nuclear sclerosis) 4/1/2013    Nuclear sclerosis - Both Eyes 4/29/2013    Preseptal cellulitis - Right Eye 4/29/2013    Proliferative diabetic retinopathy - Both Eyes 4/29/2013    Proliferative diabetic retinopathy, both eyes 4/1/2013    PSC (posterior subcapsular cataract) - Both Eyes 4/29/2013    S/P hernia repair 12/19/2012    TIA (transient ischemic attack) 11/18/2014    Tinea pedis 7/24/2012    Tinea pedis is present on both feet.    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 The patient is awake, alert and aware of environment with an appropriate affect, the patient is oriented x 3 and speaking appropriately.   APPEARANCE: Patient resting comfortably, patient is clean and well groomed, patient's clothing is properly fastened.  SKIN: The skin is cool and dry, color consistent with ethnicity, patient has normal skin turgor and moist mucus membranes, skin intact, no breakdown or bruising noted.  MUSCULOSKELETAL: Patient moving all extremities well, no obvious swelling or deformities noted.   RESPIRATORY: Respirations are spontaneous, patient has a normal effort and rate, no accessory muscle use noted.  CARDIAC: Patient has a normal rate and rhythm, no periphreal edema noted, capillary refill < 3 seconds.  ABDOMEN: Soft and non tender to palpation, no distention noted. Bowel sounds present in all four quadrants.  NEUROLOGIC: Eyes open spontaneously, behavior appropriate to situation, follows commands, facial expression symmetrical, bilateral hand grasp equal and even, purposeful motor response noted, normal sensation in all extremities when touched with a finger.

## 2018-01-22 ENCOUNTER — TELEPHONE (OUTPATIENT)
Dept: INTERNAL MEDICINE | Facility: CLINIC | Age: 73
End: 2018-01-22

## 2018-01-22 PROBLEM — I50.30 (HFPEF) HEART FAILURE WITH PRESERVED EJECTION FRACTION: Status: ACTIVE | Noted: 2018-01-22

## 2018-01-22 PROBLEM — R74.8 ALKALINE PHOSPHATASE ELEVATION: Status: ACTIVE | Noted: 2018-01-22

## 2018-01-22 PROBLEM — Z86.73 HX OF TRANSIENT ISCHEMIC ATTACK (TIA): Status: ACTIVE | Noted: 2018-01-22

## 2018-01-22 LAB
AMYLASE SERPL-CCNC: 47 U/L
ANION GAP SERPL CALC-SCNC: 8 MMOL/L
BACTERIA UR CULT: NO GROWTH
BASOPHILS # BLD AUTO: 0.01 K/UL
BASOPHILS NFR BLD: 0.3 %
BUN SERPL-MCNC: 33 MG/DL
CALCIUM SERPL-MCNC: 9.5 MG/DL
CHLORIDE SERPL-SCNC: 111 MMOL/L
CO2 SERPL-SCNC: 24 MMOL/L
CREAT SERPL-MCNC: 1.6 MG/DL
DIFFERENTIAL METHOD: ABNORMAL
EOSINOPHIL # BLD AUTO: 0 K/UL
EOSINOPHIL NFR BLD: 0.6 %
ERYTHROCYTE [DISTWIDTH] IN BLOOD BY AUTOMATED COUNT: 14.6 %
EST. GFR  (AFRICAN AMERICAN): 36.1 ML/MIN/1.73 M^2
EST. GFR  (NON AFRICAN AMERICAN): 31.3 ML/MIN/1.73 M^2
GGT SERPL-CCNC: 37 U/L
GLUCOSE SERPL-MCNC: 72 MG/DL
HCT VFR BLD AUTO: 30.9 %
HGB BLD-MCNC: 10.2 G/DL
IMM GRANULOCYTES # BLD AUTO: 0.01 K/UL
IMM GRANULOCYTES NFR BLD AUTO: 0.3 %
LIPASE SERPL-CCNC: 55 U/L
LYMPHOCYTES # BLD AUTO: 1.1 K/UL
LYMPHOCYTES NFR BLD: 33.1 %
MAGNESIUM SERPL-MCNC: 1.6 MG/DL
MCH RBC QN AUTO: 29.1 PG
MCHC RBC AUTO-ENTMCNC: 33 G/DL
MCV RBC AUTO: 88 FL
MONOCYTES # BLD AUTO: 0.3 K/UL
MONOCYTES NFR BLD: 10.1 %
NEUTROPHILS # BLD AUTO: 1.8 K/UL
NEUTROPHILS NFR BLD: 55.6 %
NRBC BLD-RTO: 0 /100 WBC
PHOSPHATE SERPL-MCNC: 2.5 MG/DL
PLATELET # BLD AUTO: 175 K/UL
PMV BLD AUTO: 10.4 FL
POCT GLUCOSE: 107 MG/DL (ref 70–110)
POCT GLUCOSE: 115 MG/DL (ref 70–110)
POCT GLUCOSE: 151 MG/DL (ref 70–110)
POCT GLUCOSE: 59 MG/DL (ref 70–110)
POTASSIUM SERPL-SCNC: 5 MMOL/L
RBC # BLD AUTO: 3.5 M/UL
SODIUM SERPL-SCNC: 143 MMOL/L
WBC # BLD AUTO: 3.17 K/UL

## 2018-01-22 PROCEDURE — 20600001 HC STEP DOWN PRIVATE ROOM

## 2018-01-22 PROCEDURE — 63600175 PHARM REV CODE 636 W HCPCS: Performed by: STUDENT IN AN ORGANIZED HEALTH CARE EDUCATION/TRAINING PROGRAM

## 2018-01-22 PROCEDURE — 25000003 PHARM REV CODE 250: Performed by: STUDENT IN AN ORGANIZED HEALTH CARE EDUCATION/TRAINING PROGRAM

## 2018-01-22 PROCEDURE — 36415 COLL VENOUS BLD VENIPUNCTURE: CPT

## 2018-01-22 PROCEDURE — 84100 ASSAY OF PHOSPHORUS: CPT

## 2018-01-22 PROCEDURE — 99223 1ST HOSP IP/OBS HIGH 75: CPT | Mod: AI,GC,, | Performed by: HOSPITALIST

## 2018-01-22 PROCEDURE — 82977 ASSAY OF GGT: CPT

## 2018-01-22 PROCEDURE — 85025 COMPLETE CBC W/AUTO DIFF WBC: CPT

## 2018-01-22 PROCEDURE — 83690 ASSAY OF LIPASE: CPT

## 2018-01-22 PROCEDURE — 63600175 PHARM REV CODE 636 W HCPCS: Performed by: HOSPITALIST

## 2018-01-22 PROCEDURE — 80048 BASIC METABOLIC PNL TOTAL CA: CPT

## 2018-01-22 PROCEDURE — 83735 ASSAY OF MAGNESIUM: CPT

## 2018-01-22 PROCEDURE — 82150 ASSAY OF AMYLASE: CPT

## 2018-01-22 RX ORDER — CEFTRIAXONE 1 G/1
1 INJECTION, POWDER, FOR SOLUTION INTRAMUSCULAR; INTRAVENOUS
Status: DISCONTINUED | OUTPATIENT
Start: 2018-01-22 | End: 2018-01-22

## 2018-01-22 RX ORDER — HEPARIN SODIUM 5000 [USP'U]/ML
7500 INJECTION, SOLUTION INTRAVENOUS; SUBCUTANEOUS EVERY 12 HOURS
Status: DISCONTINUED | OUTPATIENT
Start: 2018-01-22 | End: 2018-01-23 | Stop reason: HOSPADM

## 2018-01-22 RX ADMIN — INSULIN DETEMIR 8 UNITS: 100 INJECTION, SOLUTION SUBCUTANEOUS at 09:01

## 2018-01-22 RX ADMIN — Medication 16 G: at 10:01

## 2018-01-22 RX ADMIN — HEPARIN SODIUM 7500 UNITS: 5000 INJECTION, SOLUTION INTRAVENOUS; SUBCUTANEOUS at 11:01

## 2018-01-22 RX ADMIN — ATORVASTATIN CALCIUM 40 MG: 10 TABLET, FILM COATED ORAL at 12:01

## 2018-01-22 RX ADMIN — CLOPIDOGREL 75 MG: 75 TABLET, FILM COATED ORAL at 10:01

## 2018-01-22 RX ADMIN — STANDARDIZED SENNA CONCENTRATE AND DOCUSATE SODIUM 2 TABLET: 8.6; 5 TABLET, FILM COATED ORAL at 10:01

## 2018-01-22 RX ADMIN — HEPARIN SODIUM 7500 UNITS: 5000 INJECTION, SOLUTION INTRAVENOUS; SUBCUTANEOUS at 09:01

## 2018-01-22 RX ADMIN — ENOXAPARIN SODIUM 30 MG: 100 INJECTION SUBCUTANEOUS at 12:01

## 2018-01-22 RX ADMIN — ASPIRIN 81 MG 81 MG: 81 TABLET ORAL at 10:01

## 2018-01-22 RX ADMIN — Medication 1 G: at 10:01

## 2018-01-22 RX ADMIN — ATORVASTATIN CALCIUM 40 MG: 10 TABLET, FILM COATED ORAL at 09:01

## 2018-01-22 NOTE — ASSESSMENT & PLAN NOTE
73 year old female with history of DM, neuropathy, wheelchair bound and history of multiple presentations of hypothermia due to suspected UTI.  DDx includes infection, compounded by age, immobility, or endocrine disorder. TSH WNL, previous cortisol WNL       - Bear hugger  - Cover with Ceftriaxone for suspected underlying infection.   - Would recommend Endocrine follow up outpatient to rule out other causes

## 2018-01-22 NOTE — PLAN OF CARE
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  Pt states she is wheelchair bound and has [facility] for wound care of wounds to both legs presently.  Pt states she has 2 daughters that are involved and will pick her up at time of discharge.  CM will continue to follow.       01/22/18 1200   Discharge Assessment   Assessment Type Discharge Planning Assessment   Confirmed/corrected address and phone number on facesheet? Yes   Assessment information obtained from? Patient   Expected Length of Stay (days) 2   Communicated expected length of stay with patient/caregiver yes   Prior to hospitilization cognitive status: Alert/Oriented   Prior to hospitalization functional status: Wheelchair Bound   Current cognitive status: Alert/Oriented   Current Functional Status: Needs Assistance;Wheelchair Bound   Facility Arrived From: ([facility])   Lives With child(cr), adult  (daughter x2)   Able to Return to Prior Arrangements unable to determine at this time (comments)   Is patient able to care for self after discharge? Unable to determine at this time (comments)   Who are your caregiver(s) and their phone number(s)? (Jazmyn Santo, daughter, [phone]; Hayley Velasco, daughter, [phone])   Patient's perception of discharge disposition home health  (Resume with [facility])   Readmission Within The Last 30 Days current reason for admission unrelated to previous admission   Patient currently being followed by outpatient case management? No   Patient currently receives any other outside agency services? No   Equipment Currently Used at Home power chair;shower chair;glucometer;grab bar;3-in-1 commode;raised toilet   Do you have any problems affording any of your prescribed medications? No   Is the patient taking medications as prescribed? yes   Does the patient have transportation home? Yes   Transportation Available family or friend will provide   Does the patient receive services at the [facility]?  "No   Discharge Plan A Home;Home with family;Home Health   Discharge Plan B Skilled Nursing Facility   Patient/Family In Agreement With Plan yes   Readmission Questionnaire   At the time of your discharge, did someone talk to you about what your health problems were? Yes   At the time of discharge, did someone talk to you about what to watch out for regarding worsening of your health problem? Yes   At the time of discharge, did someone talk to you about what to do if you experienced worsening of your health problem? Yes   At the time of discharge, did someone talk to you about which medication to take when you left the hospital and which ones to stop taking? Yes   At the time of discharge, did someone talk to you about when and where to follow up with a doctor after you left the hospital? Yes   What do you believe caused you to be sick enough to be re-admitted? ("my temperature was low")   How often do you need to have someone help you when you read instructions, pamphlets, or other written material from your doctor or pharmacy? Rarely   Do you have problems taking your medications as prescribed? No   Do you have any problems affording any of  your prescribed medications? No   Do you have problems obtaining/receiving your medications? No   Does the patient have transportation to healthcare appointments? Yes   Living Arrangements house   Does the patient have family/friends to help with healtcare needs after discharge? no   Does your caregiver provide all the help you need? Yes   Are you currently feeling confused? No   Are you currently having problems thinking? No   Are you currently having memory problems? No   Have you felt down, depressed, or hopeless? 0   Have you felt little interest or pleasure in doing things? 0   In the last 7 days, my sleep quality was: miri Vasquez MD  [address] [facility] [address]      [facility] [place] [facility] [id number] [place] [address] [address] AT [facility] " [address] [address]  Phone: [phone] Fax: [phone]    [facility] - [place], [place] - [address] [facility] [address]  [address]  [facility] [address]  Phone: [phone] Fax: [phone]      Payor: HU[employer] MANAGED MEDICARE / Plan: HU[employer] MEDICARE HMO / Product Type: Capitation /

## 2018-01-22 NOTE — ASSESSMENT & PLAN NOTE
73 year old female with history of DM, neuropathy, wheelchair bound and history of multiple presentations of hypothermia do to suspected UTI.  DDx includes infection, compounded by age.     - Bear hugger  - Cover with Ceftriaxone for suspected underlying infection.   - Would recommend Endocrine follow up outpatient to rule out other causes

## 2018-01-22 NOTE — HPI
Sherin Ortiz is a 73 y.o. 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  Patient reports her oral temperature at home today was 92.  She took her temperature after she started to feel diffuse weakness x 1 day with associated with fatigue, paresthesia of upper lip.  Patient states this is how she feels when she is hypothermic, or has an UTI.  In addition she reports chronic back pain.  She otherwise reports she is in her normal state of health.  Reports she was recently admitted for similar presentation 1/6-1/8, she was treated with IV ceftriaxone and discharged on PO Keflex (culture pan sensitive).  She reports her chronic lower extremity wounds are improving and denies drainage or change in smell.  She currently denies dysuria, change in urine frequency/color/ or odor, HA, fever, chills, flank pain, abdominal pain or nausea and vomiting.            Patient is dependent on a motorized wheelchair since her 50s needs assistance with most of her ADLs.  She is followed by [facility] in wound care clinic for bilateral leg wounds and gets home health for wound care.  She has a penicillin allergy but has tolerated cephalosporins.  She typically gets hypothermia and leukopenia when she has urinary tract infections.

## 2018-01-22 NOTE — PLAN OF CARE
Problem: Fall Risk (Adult)  Intervention: Patient Rounds  Fall precautions reviewed and maintained with pt.

## 2018-01-22 NOTE — PLAN OF CARE
"   01/22/18 1200   Readmission Questionnaire   At the time of your discharge, did someone talk to you about what your health problems were? Yes   At the time of discharge, did someone talk to you about what to watch out for regarding worsening of your health problem? Yes   At the time of discharge, did someone talk to you about what to do if you experienced worsening of your health problem? Yes   At the time of discharge, did someone talk to you about which medication to take when you left the hospital and which ones to stop taking? Yes   At the time of discharge, did someone talk to you about when and where to follow up with a doctor after you left the hospital? Yes   What do you believe caused you to be sick enough to be re-admitted? ("my temperature was low")   How often do you need to have someone help you when you read instructions, pamphlets, or other written material from your doctor or pharmacy? Rarely   Do you have problems taking your medications as prescribed? No   Do you have any problems affording any of  your prescribed medications? No   Do you have problems obtaining/receiving your medications? No   Does the patient have transportation to healthcare appointments? Yes   Lives With child(cr), adult  (daughter x2)   Living Arrangements house   Does the patient have family/friends to help with healtcare needs after discharge? no   Who are your caregiver(s) and their phone number(s)? (Jazmyn Santo, daughter, [phone]; Hayley Velasco, daughter, [phone])   Does your caregiver provide all the help you need? Yes   Are you currently feeling confused? No   Are you currently having problems thinking? No   Are you currently having memory problems? No   Have you felt down, depressed, or hopeless? 0   Have you felt little interest or pleasure in doing things? 0   In the last 7 days, my sleep quality was: fair     "

## 2018-01-22 NOTE — H&P
[facility] Hospital Medicine  History & Physical    Patient Name: Sherin Ortiz  MRN: [id number]  Admission Date: 1/21/2018  Attending Physician: Julia Jeffries*   Primary Care Provider: Ame Vasquez MD    [facility] Medicine Team: Networked reference to record PCT  Nirav Finn MD     Patient information was obtained from patient and ER records.     Subjective:     Principal Problem:Hypothermia    Chief Complaint:   Chief Complaint   Patient presents with    Low Temperature     92.0 oral at home; per daughter, usually means pt has infection         HPI: Sherin Ortiz is a 73 y.o. 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  Patient reports her oral temperature at home today was 92.  She took her temperature after she started to feel diffuse weakness x 1 day with associated with fatigue, paresthesia of upper lip.  Patient states this is how she feels when she is hypothermic, or has an UTI.  In addition she reports chronic back pain.  She otherwise reports she is in her normal state of health.  Reports she was recently admitted for similar presentation 1/6-1/8, she was treated with IV ceftriaxone and discharged on PO Keflex (culture pan sensitive).  She reports her chronic lower extremity wounds are improving and denies drainage or change in smell.  She currently denies dysuria, change in urine frequency/color/ or odor, HA, fever, chills, flank pain, abdominal pain or nausea and vomiting.            Patient is dependent on a motorized wheelchair since her 50s needs assistance with most of her ADLs.  Her primary care physician is Dr. Ame Vasquez.  Her nephrologist is Dr. Cynthia Choi.  She is followed by HERMES Mccoy in Endocrinology clinic.  She is followed by NP Akosua Alvarenga in wound care clinic for bilateral leg  wounds and gets home health for wound care.  She has a penicillin allergy but has tolerated cephalosporins.  She typically gets hypothermia and leukopenia when she has urinary tract infections.      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    Benign essential hypertension 11/14/2012    Cataract     s/p phacoemulsification    Chronic kidney disease (CKD), stage III (moderate) 9/12/2013    Diabetic peripheral neuropathy associated with type 2 diabetes mellitus 11/14/2014    causing right hemiparesis    Gait disorder     Hyperlipidemia     Iritis - Both Eyes 6/10/2013    Kidney stone     Lymphedema     Morbid obesity with BMI of 40.0-44.9, adult 2/18/2015    Nephrolithiasis 4/20/2016    NS (nuclear sclerosis) 4/1/2013    Nuclear sclerosis - Both Eyes 4/29/2013    Preseptal cellulitis - Right Eye 4/29/2013    Proliferative diabetic retinopathy - Both Eyes 4/29/2013    Proliferative diabetic retinopathy, both eyes 4/1/2013    PSC (posterior subcapsular cataract) - Both Eyes 4/29/2013    S/P hernia repair 12/19/2012    TIA (transient ischemic attack) 11/18/2014    Tinea pedis 7/24/2012    Tinea pedis is present on both feet.    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 Kat Bower       Review of patient's allergies indicates:   Allergen Reactions    Penicillins Hives     Other reaction(s): Hives    Sulfa (sulfonamide antibiotics) Other (See Comments)     Shakes, pt states her doctor told her the shakes were possibly caused by an allergy to sulfa       No current facility-administered medications on file prior to encounter.      Current Outpatient Prescriptions on File Prior to Encounter   Medication Sig    ACCU-CHEK RAMIRO PLUS METER Misc     ACCU-CHEK RAMIRO PLUS TEST STRP Strp TEST TWICE DAILY    ACCU-CHEK SOFTCLIX LANCETS Misc     aspirin 81 MG Chew Take 81 mg by mouth once daily.      atorvastatin (LIPITOR) 40 MG tablet TAKE 1 TABLET EVERY EVENING    BD INSULIN SYRINGE ULTRA-FINE 1/2 mL 30 gauge x 1/2" Syrg USE EVERY NIGHT    clopidogrel (PLAVIX) 75 mg tablet TAKE 1 TABLET ONE TIME DAILY    diclofenac sodium 1 % Gel Apply 2 g topically nightly as needed.    econazole nitrate 1 % cream Apply 1 application topically once daily. Apply to affected area    ergocalciferol (ERGOCALCIFEROL) 50,000 unit Cap Take 1 capsule (50,000 Units total) by mouth every 7 days. (Patient taking differently: Take 50,000 Units by mouth every Monday. )    glimepiride (AMARYL) 1 MG tablet Take 2 tablets (2 mg total) by mouth with lunch.    hydroCHLOROthiazide (HYDRODIURIL) 25 MG tablet Take 1 tablet (25 mg total) by mouth once daily.    LANTUS 100 unit/mL injection INJECT 7 TO 10 UNITS SUBCUTANEOUSLY IN THE EVENING DEPENDING ON SCALE (DISCARD EACH VIAL AFTER 28 DAYS) (Patient taking differently: 35 units)    triamcinolone acetonide " 0.1% (KENALOG) 0.1 % cream APPLY TOPICALLY TO BOTH LEGS Q MONDAY AND FRIDAY     Family History     Problem Relation (Age of Onset)    Cataracts Sister, Brother    Diabetes Sister    Heart disease Brother    Leukemia Mother        Social History Main Topics    Smoking status: Former Smoker     Packs/day: 0.50     Years: 15.00     Types: Cigarettes     Quit date: 7/9/1982    Smokeless tobacco: Former User      Comment: smoked one pack per week    Alcohol use No    Drug use: No    Sexual activity: Not Currently     Review of Systems   Constitutional: Positive for fatigue. Negative for chills and fever.   HENT: Negative for congestion, postnasal drip, rhinorrhea and sore throat.    Respiratory: Negative for cough and shortness of breath.    Cardiovascular: Positive for leg swelling. Negative for chest pain and palpitations.   Gastrointestinal: Negative for abdominal pain, diarrhea, nausea and vomiting.   Genitourinary: Negative for dysuria, flank pain, frequency and urgency.   Musculoskeletal: Negative for arthralgias, gait problem and neck stiffness.   Skin: Negative for color change and rash.   Neurological: Positive for weakness. Negative for dizziness, tremors, speech difficulty and headaches.   Hematological: Negative for adenopathy. Does not bruise/bleed easily.   Psychiatric/Behavioral: Negative for agitation, confusion, self-injury and sleep disturbance.     Objective:     Vital Signs (Most Recent):  Temp: (!) 95.9 °F (35.5 °C) (01/21/18 2102)  Pulse: 72 (01/21/18 2137)  Resp: 17 (01/21/18 2137)  BP: (!) 151/68 (01/21/18 2002)  SpO2: 99 % (01/21/18 2137) Vital Signs (24h Range):  Temp:  [92.9 °F (33.8 °C)-95.9 °F (35.5 °C)] 95.9 °F (35.5 °C)  Pulse:  [65-77] 72  Resp:  [15-19] 17  SpO2:  [98 %-100 %] 99 %  BP: (151-182)/(68-81) 151/68     Weight: (!) 145.2 kg (320 lb)  Body mass index is 50.12 kg/m².    Physical Exam   Constitutional: She is oriented to person, place, and time. She appears well-developed  and well-nourished.   Obese, laying on left side    HENT:   Head: Normocephalic and atraumatic.   Right Ear: External ear normal.   Left Ear: External ear normal.   Mouth/Throat: Oropharynx is clear and moist.   Eyes: Conjunctivae and EOM are normal. Pupils are equal, round, and reactive to light.   Neck: Normal range of motion. Neck supple.   Cardiovascular: Normal rate, regular rhythm, normal heart sounds and intact distal pulses.    No murmur heard.  Pulmonary/Chest: Effort normal and breath sounds normal. No respiratory distress. She has no wheezes.   Abdominal: Soft. Bowel sounds are normal. She exhibits no distension and no mass. There is no tenderness.   Musculoskeletal: Normal range of motion. She exhibits edema. She exhibits no tenderness (negative CVA tenderness ).   Bilateral leg wrappings    Neurological: She is alert and oriented to person, place, and time.   Skin: Skin is warm and dry. Capillary refill takes less than 2 seconds.   Psychiatric: She has a normal mood and affect. Her behavior is normal. Judgment and thought content normal.   Nursing note and vitals reviewed.        CRANIAL NERVES     CN III, IV, VI   Pupils are equal, round, and reactive to light.  Extraocular motions are normal.        Significant Labs: All pertinent labs within the past 24 hours have been reviewed.  UA shows WBC 40, occasional bacterial, and +2 leukocytes.     Significant Imaging: I have reviewed all pertinent imaging results/findings within the past 24 hours.   CXR grossly clear     Assessment/Plan:     * Hypothermia    73 year old female with history of DM, neuropathy, wheelchair bound and history of multiple presentations of hypothermia due to suspected UTI.  DDx includes infection, compounded by age, immobility, or endocrine disorder. TSH WNL, previous cortisol WNL       - Bear hugger  - Cover with Ceftriaxone for suspected underlying infection.   - Would recommend Endocrine follow up outpatient to rule out other  causes         Lower urinary tract infectious disease    Multiple UTIs in the past, most recent cultures no growth, prior to that grew out pan sensitive Ecoli.  UA not convincing for infection, and procal negative.    - Cover with Ceftriaxone and follow up urine cultures  - If repeat UTI, will order US kidney to check for stones/ abscess             Uncontrolled type 2 diabetes mellitus with stage 3 chronic kidney disease, with long-term current use of insulin    Per chart scheduled for 10 units of lantus per day.  Patient reports 30  - Will start 10 units detemir QHS and cover with sliding scale         Stage 3 chronic kidney disease    Creatine at baseline  - Avoid nephrotoxic agents and renally dose medications.          Essential hypertension    - hold home HCTz 25mg daily for now           Chronic venous hypertension with ulcer involving both sides    Followed by home health, next wrapping change scheduled for Tuesday.  - Wound care consult           Alkaline phosphatase elevation    Elevation noted on previous visit  - Will obtain GGT  - If GGT elevated will obtain US RUQ           Hx of transient ischemic attack (TIA)    - Continue Plavix           Wheelchair dependent    - PT/OT consult           Anemia of chronic disease    - Stable             VTE Risk Mitigation         Ordered     enoxaparin injection 30 mg  Daily     Route:  Subcutaneous        01/21/18 2136     Medium Risk of VTE  Once      01/21/18 2136         Dispo: IV abx and follow up with urine culture, can likely transition to PO.        Nirav Finn MD  Department of Hospital Medicine   [facility]

## 2018-01-22 NOTE — PLAN OF CARE
Problem: Fall Risk (Adult)  Intervention: Review Medications/Identify Contributors to Fall Risk  Current medication regimen reviewed pt. And her daughter

## 2018-01-22 NOTE — MEDICAL/APP STUDENT
Subjective:       Patient ID: Sherin Ortiz is a 73 y.o. female.    Chief Complaint: Low Temperature (92.0 oral at home; per daughter, usually means pt has infection )      HPI:  Ms. Ortiz is a 74yo F with morbid obesity HTN, HLD, IDDM2 with retinopathy and nephropathy, CKD stage 3, chronic venous insufficiency, bilateral tinea pedis, frequent UTIs. She is wheelchair bound.  The pt was brought to the ED Sunday evening 1/21 by her daughter with c/o hypothermia. Oral tem,pp was 92. Also reported general weakness, fatigue, and paresthesia of upper lip.  Pt reports this is a typical onset of UTI for her.  Recently admitted for similar presentation 1/6-1/8 - was treated with ceftriaxone and discharged on Keflax (culture pan sensitive). Pt reports lower extremity wounds are improving - denies drainage or change in smell. Denies dysuria, change in urinary freq/color/odor, headache, fever, chills, flank pain, abdo pain, N/V.    Interval hx:  Pt reports feeling weak and nauseous overnight, and having R shoulder pain (chronic).  Nursing reports no significant events overnight.  No BMs.          Review of Systems   Constitutional: Positive for fatigue. Negative for appetite change, chills, diaphoresis and fever.   Respiratory: Negative for cough, chest tightness, shortness of breath and wheezing.    Cardiovascular: Negative for chest pain.   Gastrointestinal: Positive for nausea. Negative for diarrhea.   Genitourinary: Negative for difficulty urinating, dysuria, flank pain, frequency, hematuria and urgency.   Musculoskeletal: Positive for arthralgias.        Chronic R shoulder pain - ?rotator cuff tear   Neurological: Positive for weakness. Negative for headaches.   Psychiatric/Behavioral: Negative for confusion.       Objective:       Vitals:    01/21/18 2223 01/21/18 2358 01/22/18 0443 01/22/18 0845   BP:  (!) 112/54 118/65 (!) 124/58   BP Location:  Left arm Left arm Right arm   Patient Position:  Lying Lying Lying  "  Pulse: 76 78 74 78   Resp: 16 18 18 18   Temp:  98.2 °F (36.8 °C) 98.1 °F (36.7 °C) 97.7 °F (36.5 °C)   TempSrc:  Oral Oral Oral   SpO2: 98% 97% 96% 96%   Weight:  (!) 145.2 kg (320 lb 1.7 oz)     Height:  5' 7" (1.702 m)           Physical Exam   Constitutional: She is cooperative.   Eyes: Right pupil is reactive. Left pupil is reactive.   Bilateral miosis   Cardiovascular: Normal rate and regular rhythm.  Exam reveals distant heart sounds.    Pulses:       Radial pulses are 1+ on the right side, and 1+ on the left side.   Heart hard to auscultate due to body habitus   Pulmonary/Chest: Effort normal and breath sounds normal. She has no wheezes.   Abdominal: Soft. Bowel sounds are normal. There is no tenderness.   Lymphadenopathy:        Head (right side): No submental, no submandibular, no tonsillar, no preauricular and no posterior auricular adenopathy present.        Head (left side): No submental, no submandibular, no tonsillar, no preauricular and no posterior auricular adenopathy present.        Right: No supraclavicular adenopathy present.        Left: No supraclavicular adenopathy present.   Neurological: She is alert.   Oriented to person, place, but not time   Skin: She is not diaphoretic.   Bilateral legs wrapped for wound management. Skin superior to the dressing was warm and erythematous.         Labs:  Hypoglycemia (POCT glucose 59)  WBC 3.17, RBC 3.5, Hb 10.2, Hct 30.9 all low  MCV of 88  Elevated Chloride 111, other electolytes unremarkable  BUN 33, Creatinine 1.6 elevated, eGFR of 31.3    Investigations:  Urine culture (sunday 1/21)  -urine hazy, 1+ protein, 3+ glucose, 2+ leukocytes    Imaging:  AP CXR (sunday 1/21) - grossly normal      Assessment:    Ms. Ortiz is a 72yo F  with HTN, HLD, IDDM2 with retinopathy and nephropathy, CKD stage 3, chronic venous insufficiency, bilateral tinea pedis, frequent UTIs who presents with hypothermia.  Based on her her positive urine culture and " symptomatology (hypothermia, weakness)  consistent with her past UTIs she most likely has a UTI.    Plan:       1. Hypothermia  -Temp now returned to acceptable range with bear hugger machine  -Continue monitoring  -Endocrine workup    2. Lower UTI  -Ceftriaxone IV 1g/50mL D5W - 1g/24hrs  -FU urine cultures  -Order U/S if repeat UTI    3. IDDM2 with stage 3 CKD  -10 units of insulin glargine/day  -Start 10units of detemir QHS and cover sliding scale  -Avoid nephrotoxic agents    4. Essential HTN  -Hct 25mg PO/day (on hold while in hospital)    5. Chornic venous HTN with ulcers of bilateral leg  -wound care consult  -Wounds wrapped    6. ALP elevated  -order GGT  -U/S RUQ    7. Wheelchair dependent  -PT/OT consult

## 2018-01-22 NOTE — CONSULTS
Wound care consult received for venous stasis ulcers.  Pt known to outpt wound care clinic.  Pt presents with unna boot compression therapy to bilateral LE.  Pt reports home health services on Tuesdays and Fridays.  Latest DISHA done in 2015.  Pt plans for discharge tomorrow.  Spoke with Dr. Napier and discussed pt to continue with wound care provided by outpatient services.  If pt is not discharged tomorrow, please reconsult wound care team after DISHA results obtained.  [id number]

## 2018-01-22 NOTE — ASSESSMENT & PLAN NOTE
Multiple UTIs in the past, most recent cultures grew out pan sensitive Ecoli, UA shows leukocytes, and WBC clumps  - Cover with Ceftriaxone and follow up urine cultures  - Given concern for repeat UTI, will order US kidney to check for stones/ abscess

## 2018-01-22 NOTE — SUBJECTIVE & OBJECTIVE
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    Benign essential hypertension 11/14/2012    Cataract     s/p phacoemulsification    Chronic kidney disease (CKD), stage III (moderate) 9/12/2013    Diabetic peripheral neuropathy associated with type 2 diabetes mellitus 11/14/2014    causing right hemiparesis    Gait disorder     Hyperlipidemia     Iritis - Both Eyes 6/10/2013    Kidney stone     Lymphedema     Morbid obesity with BMI of 40.0-44.9, adult 2/18/2015    Nephrolithiasis 4/20/2016    NS (nuclear sclerosis) 4/1/2013    Nuclear sclerosis - Both Eyes 4/29/2013    Preseptal cellulitis - Right Eye 4/29/2013    Proliferative diabetic retinopathy - Both Eyes 4/29/2013    Proliferative diabetic retinopathy, both eyes 4/1/2013    PSC (posterior subcapsular cataract) - Both Eyes 4/29/2013    S/P hernia repair 12/19/2012    TIA (transient ischemic attack) 11/18/2014    Tinea pedis 7/24/2012    Tinea pedis is present on both feet.     Type 2 diabetes mellitus with renal manifestations, controlled 12/12/2013    Type 2 diabetes, controlled, with moderate nonproliferative diabetic retinopathy without macular edema 9/17/2015    Ulcer of left lower extremity, limited to breakdown of skin 7/8/2015    Unspecified cerebral artery occlusion with cerebral infarction 11/16/2014    Unspecified venous (peripheral) insufficiency     Ureteral stone with hydronephrosis 1/27/2016    UTI (lower urinary tract infection)     Vaginal infection     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       Past Surgical History:   Procedure Laterality Date    APPENDECTOMY      CATARACT EXTRACTION W/  INTRAOCULAR LENS IMPLANT Left 5/21/2013    CATARACT EXTRACTION W/  INTRAOCULAR LENS IMPLANT Right 6/4/2013    CHOLECYSTECTOMY      COLONOSCOPY  12/22/2005    normal    ESOPHAGOGASTRODUODENOSCOPY  12/21/2015    hiatal hernia, Schatzki ring    EYE SURGERY Bilateral 2008  "   laser surgery both eyes    NASAL SEPTUM SURGERY      SUBTOTAL COLECTOMY  12/13/2012    transverse colon, for incarcerated umbilical hernia, Dr. Kat Bower       Review of patient's allergies indicates:   Allergen Reactions    Penicillins Hives     Other reaction(s): Hives    Sulfa (sulfonamide antibiotics) Other (See Comments)     Shakes, pt states her doctor told her the shakes were possibly caused by an allergy to sulfa       No current facility-administered medications on file prior to encounter.      Current Outpatient Prescriptions on File Prior to Encounter   Medication Sig    ACCU-CHEK RAMIRO PLUS METER Misc     ACCU-CHEK RAMIRO PLUS TEST STRP Strp TEST TWICE DAILY    ACCU-CHEK SOFTCLIX LANCETS Misc     aspirin 81 MG Chew Take 81 mg by mouth once daily.      atorvastatin (LIPITOR) 40 MG tablet TAKE 1 TABLET EVERY EVENING    BD INSULIN SYRINGE ULTRA-FINE 1/2 mL 30 gauge x 1/2" Syrg USE EVERY NIGHT    clopidogrel (PLAVIX) 75 mg tablet TAKE 1 TABLET ONE TIME DAILY    diclofenac sodium 1 % Gel Apply 2 g topically nightly as needed.    econazole nitrate 1 % cream Apply 1 application topically once daily. Apply to affected area    ergocalciferol (ERGOCALCIFEROL) 50,000 unit Cap Take 1 capsule (50,000 Units total) by mouth every 7 days. (Patient taking differently: Take 50,000 Units by mouth every Monday. )    glimepiride (AMARYL) 1 MG tablet Take 2 tablets (2 mg total) by mouth with lunch.    hydroCHLOROthiazide (HYDRODIURIL) 25 MG tablet Take 1 tablet (25 mg total) by mouth once daily.    LANTUS 100 unit/mL injection INJECT 7 TO 10 UNITS SUBCUTANEOUSLY IN THE EVENING DEPENDING ON SCALE (DISCARD EACH VIAL AFTER 28 DAYS) (Patient taking differently: 35 units)    triamcinolone acetonide 0.1% (KENALOG) 0.1 % cream APPLY TOPICALLY TO BOTH LEGS Q MONDAY AND FRIDAY     Family History     Problem Relation (Age of Onset)    Cataracts Sister, Brother    Diabetes Sister    Heart disease Brother    " Leukemia Mother        Social History Main Topics    Smoking status: Former Smoker     Packs/day: 0.50     Years: 15.00     Types: Cigarettes     Quit date: 7/9/1982    Smokeless tobacco: Former User      Comment: smoked one pack per week    Alcohol use No    Drug use: No    Sexual activity: Not Currently     Review of Systems   Constitutional: Positive for fatigue. Negative for chills and fever.   HENT: Negative for congestion, postnasal drip, rhinorrhea and sore throat.    Respiratory: Negative for cough and shortness of breath.    Cardiovascular: Positive for leg swelling. Negative for chest pain and palpitations.   Gastrointestinal: Negative for abdominal pain, diarrhea, nausea and vomiting.   Genitourinary: Negative for dysuria, flank pain, frequency and urgency.   Musculoskeletal: Negative for arthralgias, gait problem and neck stiffness.   Skin: Negative for color change and rash.   Neurological: Positive for weakness. Negative for dizziness, tremors, speech difficulty and headaches.   Hematological: Negative for adenopathy. Does not bruise/bleed easily.   Psychiatric/Behavioral: Negative for agitation, confusion, self-injury and sleep disturbance.     Objective:     Vital Signs (Most Recent):  Temp: (!) 95.9 °F (35.5 °C) (01/21/18 2102)  Pulse: 72 (01/21/18 2137)  Resp: 17 (01/21/18 2137)  BP: (!) 151/68 (01/21/18 2002)  SpO2: 99 % (01/21/18 2137) Vital Signs (24h Range):  Temp:  [92.9 °F (33.8 °C)-95.9 °F (35.5 °C)] 95.9 °F (35.5 °C)  Pulse:  [65-77] 72  Resp:  [15-19] 17  SpO2:  [98 %-100 %] 99 %  BP: (151-182)/(68-81) 151/68     Weight: (!) 145.2 kg (320 lb)  Body mass index is 50.12 kg/m².    Physical Exam   Constitutional: She is oriented to person, place, and time. She appears well-developed and well-nourished.   Obese, laying on left side    HENT:   Head: Normocephalic and atraumatic.   Right Ear: External ear normal.   Left Ear: External ear normal.   Mouth/Throat: Oropharynx is clear and moist.    Eyes: Conjunctivae and EOM are normal. Pupils are equal, round, and reactive to light.   Neck: Normal range of motion. Neck supple.   Cardiovascular: Normal rate, regular rhythm, normal heart sounds and intact distal pulses.    No murmur heard.  Pulmonary/Chest: Effort normal and breath sounds normal. No respiratory distress. She has no wheezes.   Abdominal: Soft. Bowel sounds are normal. She exhibits no distension and no mass. There is no tenderness.   Musculoskeletal: Normal range of motion. She exhibits edema. She exhibits no tenderness (negative CVA tenderness ).   Bilateral leg wrappings    Neurological: She is alert and oriented to person, place, and time.   Skin: Skin is warm and dry. Capillary refill takes less than 2 seconds.   Psychiatric: She has a normal mood and affect. Her behavior is normal. Judgment and thought content normal.   Nursing note and vitals reviewed.        CRANIAL NERVES     CN III, IV, VI   Pupils are equal, round, and reactive to light.  Extraocular motions are normal.        Significant Labs: All pertinent labs within the past 24 hours have been reviewed.  UA shows WBC 40, occasional bacterial, and +2 leukocytes.     Significant Imaging: I have reviewed all pertinent imaging results/findings within the past 24 hours.   CXR grossly clear

## 2018-01-22 NOTE — NURSING
Pt. Arrived on the unit from ED, alert and oriented x4 nad noted, vital signs stable, temp. 98.2, pt. Oriented to her room assignment and her current plan of care . On admission pt. Arrived from home with venous stasis ulcers to ble wrapped in coban, states she goes once a week. inhouse wound care will be consulted to eval these wounds, also upon arrived stage two wound to upper posterior thigh, pt. Has reddness scattered to her perineal and sacral areas, pt. On telemetry monitoring

## 2018-01-22 NOTE — ASSESSMENT & PLAN NOTE
Per chart scheduled for 10 units of lantus per day.  Patient reports 30  - Will start 10 units detemir QHS and cover with sliding scale

## 2018-01-23 VITALS
HEART RATE: 62 BPM | BODY MASS INDEX: 45.99 KG/M2 | WEIGHT: 293 LBS | TEMPERATURE: 98 F | SYSTOLIC BLOOD PRESSURE: 148 MMHG | OXYGEN SATURATION: 96 % | RESPIRATION RATE: 18 BRPM | DIASTOLIC BLOOD PRESSURE: 65 MMHG | HEIGHT: 67 IN

## 2018-01-23 LAB
ANION GAP SERPL CALC-SCNC: 7 MMOL/L
BASOPHILS # BLD AUTO: 0.01 K/UL
BASOPHILS NFR BLD: 0.4 %
BUN SERPL-MCNC: 29 MG/DL
CALCIUM SERPL-MCNC: 9.4 MG/DL
CHLORIDE SERPL-SCNC: 111 MMOL/L
CO2 SERPL-SCNC: 25 MMOL/L
CREAT SERPL-MCNC: 1.4 MG/DL
DIFFERENTIAL METHOD: ABNORMAL
EOSINOPHIL # BLD AUTO: 0 K/UL
EOSINOPHIL NFR BLD: 0.7 %
ERYTHROCYTE [DISTWIDTH] IN BLOOD BY AUTOMATED COUNT: 14.8 %
EST. GFR  (AFRICAN AMERICAN): 42.4 ML/MIN/1.73 M^2
EST. GFR  (NON AFRICAN AMERICAN): 36.8 ML/MIN/1.73 M^2
GLUCOSE SERPL-MCNC: 84 MG/DL
HCT VFR BLD AUTO: 32.2 %
HGB BLD-MCNC: 10.2 G/DL
IMM GRANULOCYTES # BLD AUTO: 0.01 K/UL
IMM GRANULOCYTES NFR BLD AUTO: 0.4 %
LYMPHOCYTES # BLD AUTO: 1.3 K/UL
LYMPHOCYTES NFR BLD: 48.2 %
MAGNESIUM SERPL-MCNC: 1.7 MG/DL
MCH RBC QN AUTO: 28.3 PG
MCHC RBC AUTO-ENTMCNC: 31.7 G/DL
MCV RBC AUTO: 89 FL
MONOCYTES # BLD AUTO: 0.3 K/UL
MONOCYTES NFR BLD: 12.1 %
NEUTROPHILS # BLD AUTO: 1 K/UL
NEUTROPHILS NFR BLD: 38.2 %
NRBC BLD-RTO: 0 /100 WBC
PHOSPHATE SERPL-MCNC: 2.7 MG/DL
PLATELET # BLD AUTO: 165 K/UL
PMV BLD AUTO: 10.1 FL
POCT GLUCOSE: 116 MG/DL (ref 70–110)
POCT GLUCOSE: 81 MG/DL (ref 70–110)
POTASSIUM SERPL-SCNC: 4.8 MMOL/L
RBC # BLD AUTO: 3.6 M/UL
SODIUM SERPL-SCNC: 143 MMOL/L
WBC # BLD AUTO: 2.72 K/UL

## 2018-01-23 PROCEDURE — 83735 ASSAY OF MAGNESIUM: CPT

## 2018-01-23 PROCEDURE — 25000003 PHARM REV CODE 250: Performed by: STUDENT IN AN ORGANIZED HEALTH CARE EDUCATION/TRAINING PROGRAM

## 2018-01-23 PROCEDURE — 63600175 PHARM REV CODE 636 W HCPCS: Performed by: STUDENT IN AN ORGANIZED HEALTH CARE EDUCATION/TRAINING PROGRAM

## 2018-01-23 PROCEDURE — 80048 BASIC METABOLIC PNL TOTAL CA: CPT

## 2018-01-23 PROCEDURE — 63600175 PHARM REV CODE 636 W HCPCS: Performed by: HOSPITALIST

## 2018-01-23 PROCEDURE — 36415 COLL VENOUS BLD VENIPUNCTURE: CPT

## 2018-01-23 PROCEDURE — 99239 HOSP IP/OBS DSCHRG MGMT >30: CPT | Mod: GC,,, | Performed by: INTERNAL MEDICINE

## 2018-01-23 PROCEDURE — 84100 ASSAY OF PHOSPHORUS: CPT

## 2018-01-23 PROCEDURE — 85025 COMPLETE CBC W/AUTO DIFF WBC: CPT

## 2018-01-23 RX ADMIN — HEPARIN SODIUM 7500 UNITS: 5000 INJECTION, SOLUTION INTRAVENOUS; SUBCUTANEOUS at 09:01

## 2018-01-23 RX ADMIN — Medication 1 G: at 09:01

## 2018-01-23 RX ADMIN — ASPIRIN 81 MG 81 MG: 81 TABLET ORAL at 09:01

## 2018-01-23 RX ADMIN — CLOPIDOGREL 75 MG: 75 TABLET, FILM COATED ORAL at 09:01

## 2018-01-23 NOTE — HOSPITAL COURSE
Admitted for her UTI and hypothermia, which was treated with antibiotics and showed significant improvement in her symptoms, her hypothermia initially treated with heating blanket. Wound care consulted and they recommended holding off on intervening of her wounds since she is receiving active wound care and follow up, as well as the need to do DISHA upon removal of the dressing and prior placing a new one. She finished her course of antibiotics with Ceftriaxone for three days for uncomplicated UTI, arranged to follow up with her PCP and Wound care and updated her home health.

## 2018-01-23 NOTE — PLAN OF CARE
Problem: Patient Care Overview  Goal: Plan of Care Review  Outcome: Ongoing (interventions implemented as appropriate)  Pt. Continent X3 last night in the bed pan. No BM.  Temp WNL throughout night. Cont pulse ox in place; spo2 stable. Rt. BLE wounds wrapped; drsg in place. Pt. Resting in bed. Bed alarm in place. NADN, VSS. Safety maintained. Call light within reach. Denies needs at this time. Will continue to monitor until oncoming shift assumes care.

## 2018-01-23 NOTE — PLAN OF CARE
CM notified per IM5 resident, Dr. Wiggins, pt is ready for discharge home today with current home health agency [facility] and orders done.  CM notified covering Justa SOLO, to please follow with HH orders and send referral to [facility] and ensure pt has transportation home.

## 2018-01-23 NOTE — PLAN OF CARE
CM received email from Denials CM stating pt was denied IP admit per [facility] Medical Director and called in to Mindi Lynch 1/22/18 4pm by Maribel Mcdermott  [phone] with Reference #[id number].  No denial letter attached.  CM placed call to Maribel Mcdermott , leaving voice message to please return call to speak about denial and if peer to peer is offered.  CM awaiting return call.

## 2018-01-23 NOTE — PLAN OF CARE
01/23/18 0954   Final Note   Assessment Type Final Discharge Note   Discharge Disposition Home-Health  ([place])   Hospital Follow Up  Appt(s) scheduled? Yes   Discharge plans and expectations educations in teach back method with documentation complete? Yes   Right Care Referral Info   Post Acute Recommendation Home-care  ([facility])     Future Appointments  Date Time Provider Department Center   1/26/2018 2:20 PM Akosua Alvarenga NP [facility] WOUND [facility]   1/30/2018 3:30 PM Cynthia Choi MD [facility] NEPHRO [facility]

## 2018-01-23 NOTE — MEDICAL/APP STUDENT
Subjective:       Patient ID: Sherin Ortiz is a 73 y.o. female.    Chief Complaint: Low Temperature (92.0 oral at home; per daughter, usually means pt has infection )    HPI:  Ms. Ortiz is a 74yo F with morbid obesity HTN, HLD, IDDM2 with retinopathy and nephropathy, CKD stage 3, chronic venous insufficiency, bilateral tinea pedis, frequent UTIs. She is wheelchair bound.  The pt was brought to the ED Sunday evening 1/21 by her daughter with c/o hypothermia. Oral tem,pp was 92. Also reported general weakness, fatigue, and paresthesia of upper lip.  Pt reports this is a typical onset of UTI for her.  Recently admitted for similar presentation 1/6-1/8 - was treated with ceftriaxone and discharged on Keflax (culture pan sensitive). Pt reports lower extremity wounds are improving - denies drainage or change in smell. Denies dysuria, change in urinary freq/color/odor, headache, fever, chills, flank pain, abdo pain, N/V.     Hospital course:  Pt reports feeling weak and nauseous overnight, and having R shoulder pain (chronic).  Nursing reports no significant events overnight.  No Bms.    Interval hx:  Pt was comfortable overnight. Nursing reports uneventful night.  NO Bms; urinated twice in bedpan.      Review of Systems   Constitutional: Negative for chills, diaphoresis, fatigue and fever.   Respiratory: Negative for cough and shortness of breath.    Cardiovascular: Negative for chest pain.   Gastrointestinal: Negative for abdominal distention, abdominal pain, nausea and vomiting.   Genitourinary: Negative for dysuria, flank pain, frequency, hematuria, pelvic pain and urgency.   Musculoskeletal: Positive for arthralgias.        R shoulder pain on mvmt   Skin: Negative for color change and pallor.   Neurological: Negative for dizziness, weakness and headaches.   Psychiatric/Behavioral: Negative for agitation and confusion.       Objective:       Vitals:    01/23/18 0346 01/23/18 0524 01/23/18 0600 01/23/18 0732    BP: (!) 138/58   (!) 154/63   BP Location:    Left arm   Patient Position: Lying   Lying   Pulse: 67 77  63   Resp: 18   18   Temp: 97.6 °F (36.4 °C)   97.8 °F (36.6 °C)   TempSrc: Oral   Oral   SpO2: 96%   (!) 94%   Weight:   134 kg (295 lb 6.7 oz)    Height:             Physical Exam   Cardiovascular: Normal rate and regular rhythm.  Exam reveals distant heart sounds.    Pulses:       Radial pulses are 1+ on the right side, and 1+ on the left side.   Pulmonary/Chest: Effort normal and breath sounds normal.   Abdominal: Soft. Bowel sounds are normal. There is no tenderness.   Musculoskeletal:        Right shoulder: She exhibits decreased range of motion.   Decreased ROM due to pain   Neurological: She is alert.   Skin: Skin is warm.   Chronic venous ulcers on legs bilaterally - wrapped. Skin warm to palpation.   Psychiatric: Her behavior is normal.         Labs:  Recent Results (from the past 48 hour(s))   Basic metabolic panel    Collection Time: 01/23/18  6:08 AM   Result Value Ref Range    Sodium 143 136 - 145 mmol/L    Potassium 4.8 3.5 - 5.1 mmol/L    Chloride 111 (H) 95 - 110 mmol/L    CO2 25 23 - 29 mmol/L    BUN, Bld 29 (H) 8 - 23 mg/dL    Creatinine 1.4 0.5 - 1.4 mg/dL    Calcium 9.4 8.7 - 10.5 mg/dL    Anion Gap 7 (L) 8 - 16 mmol/L   Basic Metabolic Panel (BMP)    Collection Time: 01/22/18  4:11 AM   Result Value Ref Range    Sodium 143 136 - 145 mmol/L    Potassium 5.0 3.5 - 5.1 mmol/L    Chloride 111 (H) 95 - 110 mmol/L    CO2 24 23 - 29 mmol/L    BUN, Bld 33 (H) 8 - 23 mg/dL    Creatinine 1.6 (H) 0.5 - 1.4 mg/dL    Calcium 9.5 8.7 - 10.5 mg/dL    Anion Gap 8 8 - 16 mmol/L         Recent Results (from the past 48 hour(s))   CBC auto differential    Collection Time: 01/23/18  6:08 AM   Result Value Ref Range    WBC 2.72 (L) 3.90 - 12.70 K/uL    Hemoglobin 10.2 (L) 12.0 - 16.0 g/dL    Hematocrit 32.2 (L) 37.0 - 48.5 %    Platelets 165 150 - 350 K/uL   CBC auto differential    Collection Time: 01/22/18   4:11 AM   Result Value Ref Range    WBC 3.17 (L) 3.90 - 12.70 K/uL    Hemoglobin 10.2 (L) 12.0 - 16.0 g/dL    Hematocrit 30.9 (L) 37.0 - 48.5 %    Platelets 175 150 - 350 K/uL   CBC auto differential    Collection Time: 01/21/18  5:09 PM   Result Value Ref Range    WBC 3.29 (L) 3.90 - 12.70 K/uL    Hemoglobin 12.0 12.0 - 16.0 g/dL    Hematocrit 36.3 (L) 37.0 - 48.5 %    Platelets 194 150 - 350 K/uL       Urinalysis ( night): hazy. 1+ protein, 3+ glucose, 2+ leukocytes      Assessment:     Ms. Ortiz is a 74yo F  with HTN, HLD, IDDM2 with retinopathy and nephropathy, CKD stage 3, chronic venous insufficiency, bilateral tinea pedis, frequent UTIs who presents with hypothermia.  Based on her her positive urine culture and symptomatology (hypothermia, weakness)  consistent with her past UTIs she most likely has a UTI.      Plan:       1. Lower UTI  -Ceftriaxone IV 1g/50mL D5W - 1g/24hrs  -FU urine cultures  -Order U/S if repeat UTI     2. IDDM2 with stage 3 CKD  -10 units of insulin glargine/day  -Start 10units of detemir QHS and cover sliding scale  -Avoid nephrotoxic agents    3. R shoulder pain  -Consult ortho  -order x-ray    4. Resolved Hypothermia  -Temp now returned to acceptable range with bear hugger machine  -Continue monitoring until D/C  -Endocrine workup    5. Essential HTN  -Hct 25mg PO/day (on hold while in hospital)     6. 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 ALP elevated  -order GGT  -U/S RUQ     8. Wheelchair dependent  -PT/OT consult

## 2018-01-23 NOTE — PLAN OF CARE
[facility]    HOME HEALTH ORDERS  FACE TO FACE ENCOUNTER    Patient Name: Sherin Ortiz  YOB: 1945    PCP: Ame Vasquez MD   PCP Address: [address] / [place] [address]  PCP Phone Number: [phone]  PCP Fax: [phone]    Encounter Date: 01/23/2018    Admit to Home Health    Diagnoses:  Active Hospital Problems    Diagnosis  POA    Lower urinary tract infectious disease [N39.0]  Yes     Priority: 1 - High    (HFpEF) heart failure with preserved ejection fraction [I50.30]  Yes     Priority: 2     Uncontrolled type 2 diabetes mellitus with stage 3 chronic kidney disease, with long-term current use of insulin [E11.22, E11.65, N18.3, Z79.4]  Not Applicable     Priority: 2      Chronic    Hx of transient ischemic attack (TIA) [Z86.73]  Not Applicable     Priority: 3     Stage 3 chronic kidney disease [N18.3]  Yes     Priority: 3      Chronic    Anemia of chronic disease [D63.8]  Yes     Priority: 4     Essential hypertension [I10]  Yes     Priority: 4      Chronic    Alkaline phosphatase elevation [R74.8]  Yes     Priority: 5     Dermatitis, stasis [I87.2]  Yes     Priority: 6      Chronic    Chronic venous hypertension with ulcer involving both sides [I87.313]  Yes     Priority: 6      Chronic    Wheelchair dependent [Z99.3]  Not Applicable     Priority: 6      Chronic    Morbid obesity with BMI of 40.0-44.9, adult [E66.01, Z68.41]  Not Applicable     Priority: 7     Hypoalbuminemia [E88.09]  Yes     Priority: 8       Resolved Hospital Problems    Diagnosis Date Resolved POA    *Hypothermia [T68.XXXA] 01/23/2018 Yes     Priority: 1 - High       Future Appointments  Date Time Provider Department Center   1/26/2018 2:20 PM Akosua Alvarenga NP [facility] WOUND [facility]   1/30/2018 3:30 PM Cynthia Choi MD [facility] NEPHRO [facility]           I have seen and examined this patient face to face today. My clinical findings that support the need for the home health  "skilled services and home bound status are the following:  Weakness/numbness causing balance and gait disturbance due to Weakness/Debility making it taxing to leave home.    Allergies:  Review of patient's allergies indicates:   Allergen Reactions    Penicillins Hives     Other reaction(s): Hives    Sulfa (sulfonamide antibiotics) Other (See Comments)     Shakes, pt states her doctor told her the shakes were possibly caused by an allergy to sulfa     Diet: cardiac diet and diabetic diet: 2000 calorie     Activities: activity as tolerated     Nursing:   SN to complete comprehensive assessment including routine vital signs. Instruct on disease process and s/s of complications to report to MD. Review/verify medication list sent home with the patient at time of discharge  and instruct patient/caregiver as needed. Frequency may be adjusted depending on start of care date.     Notify MD if SBP > 160 or < 90; DBP > 90 or < 50; HR > 120 or < 50; Temp > 101; Other:           MISCELLANEOUS CARE:  Wound Care Orders:  Resume prior wound care orders     WOUND CARE ORDERS  Currently has active home health and required two visits per     Medications: Review discharge medications with patient and family and provide education.      Current Discharge Medication List      CONTINUE these medications which have NOT CHANGED    Details   ACCU-CHEK RAMIRO PLUS TEST STRP Strp TEST TWICE DAILY  Qty: 200 strip, Refills: 3      ACCU-CHEK SOFTCLIX LANCETS Misc Test twice daily      aspirin 81 MG Chew Take 81 mg by mouth once daily.        atorvastatin (LIPITOR) 40 MG tablet TAKE 1 TABLET EVERY EVENING  Qty: 90 tablet, Refills: 3      BD INSULIN SYRINGE ULTRA-FINE 1/2 mL 30 gauge x 1/2" Syrg USE EVERY NIGHT  Qty: 90 each, Refills: 3      clopidogrel (PLAVIX) 75 mg tablet TAKE 1 TABLET ONE TIME DAILY  Qty: 90 tablet, Refills: 3      diclofenac sodium 1 % Gel Apply 2 g topically nightly as needed.  Qty: 1 Tube, Refills: 0    Associated Diagnoses: " Stage 3 chronic kidney disease due to type 2 diabetes mellitus; Uncontrolled type 2 diabetes mellitus with stage 3 chronic kidney disease, with long-term current use of insulin; Right shoulder pain, unspecified chronicity      econazole nitrate 1 % cream Apply 1 application topically once daily. Apply to affected area  Qty: 85 g, Refills: 2    Associated Diagnoses: Tinea pedis of both feet      ergocalciferol (ERGOCALCIFEROL) 50,000 unit Cap Take 1 capsule (50,000 Units total) by mouth every 7 days.  Qty: 12 capsule, Refills: 3      glimepiride (AMARYL) 1 MG tablet Take 2 tablets (2 mg total) by mouth with lunch.  Qty: 180 tablet, Refills: 3      hydroCHLOROthiazide (HYDRODIURIL) 25 MG tablet Take 1 tablet (25 mg total) by mouth once daily.  Qty: 30 tablet, Refills: 0      LANTUS 100 unit/mL injection INJECT 7 TO 10 UNITS SUBCUTANEOUSLY IN THE EVENING DEPENDING ON SCALE (DISCARD EACH VIAL AFTER 28 DAYS)  Qty: 30 mL, Refills: 3      triamcinolone acetonide 0.1% (KENALOG) 0.1 % cream APPLY TOPICALLY TO BOTH LEGS Q MONDAY AND FRIDAY  Refills: 1             I certify that this patient is confined to her home and needs intermittent skilled nursing care, physical therapy and occupational therapy.      Annita Wiggins MD  Internal Medicine Resident (PGY-II)  Pager: [phone]

## 2018-01-23 NOTE — PLAN OF CARE
CM received return call from Jackie Epps, [facility], offering peer to peer with CM providing contact information of Dr. Wiggins, IM5 resident, for Dr. Georges to call with peer to peer.  CM notified Dr. Wiggins of call to come in.    09:50am:  CM received call back from Jackie Epps [facility], discussing initial review with Jackie requesting that review be faxed again to her directly at [phone] for re-review.  CM informed Dr. Wiggins.

## 2018-01-23 NOTE — NURSING
Pt AAOx4, VSS, no acute distress noted at this time. Discharge instructions explained and given to patient. Pt verbalizes understanding and denies questions. Transport to escort pt out via wheelchair. Daughter to provide transportation home.

## 2018-01-23 NOTE — ASSESSMENT & PLAN NOTE
Per chart scheduled for 10 units of lantus per day.  Patient reports 30  - Will start 10 units detemir QHS and cover with sliding scale   Recent Labs      01/22/18   0751  01/22/18   1141  01/22/18   1720  01/22/18   2121  01/23/18   0733  01/23/18   1156   POCTGLUCOSE  59*  107  115*  151*  81  116*

## 2018-01-23 NOTE — DISCHARGE SUMMARY
[facility] Hospital Medicine  Discharge Summary      Patient Name: Sherin Ortiz  MRN: [id number]  Admission Date: 1/21/2018  Hospital Length of Stay: 2 days  Discharge Date and Time:  01/23/2018 1:09 PM  Attending Physician: Maty Cohen MD   Discharging Provider: Vinayak Wiggins MD  Primary Care Provider: Ame Vasquez MD  [facility] Medicine Team: [facility] HOSP MED 5 Vinayak Wiggins MD    HPI:   Sherin Ortiz is a 73 y.o. 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  Patient reports her oral temperature at home today was 92.  She took her temperature after she started to feel diffuse weakness x 1 day with associated with fatigue, paresthesia of upper lip.  Patient states this is how she feels when she is hypothermic, or has an UTI.  In addition she reports chronic back pain.  She otherwise reports she is in her normal state of health.  Reports she was recently admitted for similar presentation 1/6-1/8, she was treated with IV ceftriaxone and discharged on PO Keflex (culture pan sensitive).  She reports her chronic lower extremity wounds are improving and denies drainage or change in smell.  She currently denies dysuria, change in urine frequency/color/ or odor, HA, fever, chills, flank pain, abdominal pain or nausea and vomiting.            Patient is dependent on a motorized wheelchair since her 50s needs assistance with most of her ADLs.  She is followed by HERMES Alvarenga in wound care clinic for bilateral leg wounds and gets home health for wound care.  She has a penicillin allergy but has tolerated cephalosporins.  She typically gets hypothermia and leukopenia when she has urinary tract infections.      * No surgery found *      Hospital Course:   Admitted for her UTI and hypothermia, which was treated with antibiotics and  showed significant improvement in her symptoms, her hypothermia initially treated with heating blanket. Wound care consulted and they recommended holding off on intervening of her wounds since she is receiving active wound care and follow up, as well as the need to do DISHA upon removal of the dressing and prior placing a new one. She finished her course of antibiotics with Ceftriaxone for three days for uncomplicated UTI, arranged to follow up with her PCP and Wound care and updated her home health.      Physical Exam   Constitutional: She is cooperative.   Eyes: Right pupil is reactive. Left pupil is reactive.   Bilateral miosis   Cardiovascular: Normal rate and regular rhythm.  Exam reveals distant heart sounds.    Pulses:       Radial pulses are 1+ on the right side, and 1+ on the left side.   Heart hard to auscultate due to body habitus   Pulmonary/Chest: Effort normal and breath sounds normal. She has no wheezes.   Abdominal: Soft. Bowel sounds are normal. There is no tenderness.   Lymphadenopathy:        Head (right side): No submental, no submandibular, no tonsillar, no preauricular and no posterior auricular adenopathy present.        Head (left side): No submental, no submandibular, no tonsillar, no preauricular and no posterior auricular adenopathy present.        Right: No supraclavicular adenopathy present.        Left: No supraclavicular adenopathy present.   Neurological: She is alert.   Oriented to person, place, but not time   Skin: She is not diaphoretic.   Bilateral legs wrapped for wound management. Skin superior to the dressing was warm and erythematous.             Consults:     * Hypothermia-resolved as of 1/23/2018    73 year old female with history of DM, neuropathy, wheelchair bound and history of multiple presentations of hypothermia due to suspected UTI.  DDx includes infection, compounded by age, immobility, or endocrine disorder. TSH WNL, previous cortisol WNL       - Bear hugger  - Cover  with Ceftriaxone for suspected underlying infection.   - Would recommend Endocrine follow up outpatient to rule out other causes         Lower urinary tract infectious disease    - Completed the course of antibiotics for her UTI, currently asymptomatic         Uncontrolled type 2 diabetes mellitus with stage 3 chronic kidney disease, with long-term current use of insulin    Per chart scheduled for 10 units of lantus per day.  Patient reports 30  - Will start 10 units detemir QHS and cover with sliding scale   Recent Labs      01/22/18   0751  01/22/18   1141  01/22/18   1720  01/22/18   2121  01/23/18   0733  01/23/18   1156   POCTGLUCOSE  59*  107  115*  151*  81  116*           Hx of transient ischemic attack (TIA)    - Continue Plavix         Stage 3 chronic kidney disease    - Avoid nephrotoxic agents and renally dose medications.         Anemia of chronic disease    - Stable         Essential hypertension    - Discharged with resuming her home medications           Alkaline phosphatase elevation    Elevation noted on previous visit         Chronic venous hypertension with ulcer involving both sides    Followed by home health, next wrapping change scheduled for Tuesday.  - Wound care consult         Wheelchair dependent    - PT/OT consult, has active home health order         Morbid obesity with BMI of 40.0-44.9, adult    - Contributed to her stasis dermatitis and overall risk of diabetes           Final Active Diagnoses:    Diagnosis Date Noted POA    Lower urinary tract infectious disease [N39.0] 07/01/2014 Yes    (HFpEF) heart failure with preserved ejection fraction [I50.30] 01/22/2018 Yes    Uncontrolled type 2 diabetes mellitus with stage 3 chronic kidney disease, with long-term current use of insulin [E11.22, E11.65, N18.3, Z79.4] 11/08/2016 Not Applicable     Chronic    Hx of transient ischemic attack (TIA) [Z86.73] 01/22/2018 Not Applicable    Stage 3 chronic kidney disease [N18.3] 09/12/2013 Yes      Chronic    Anemia of chronic disease [D63.8] 09/18/2015 Yes    Essential hypertension [I10] 11/14/2012 Yes     Chronic    Alkaline phosphatase elevation [R74.8] 01/22/2018 Yes    Dermatitis, stasis [I87.2] 10/19/2016 Yes     Chronic    Chronic venous hypertension with ulcer involving both sides [I87.313] 10/19/2016 Yes     Chronic    Wheelchair dependent [Z99.3] 11/29/2015 Not Applicable     Chronic    Morbid obesity with BMI of 40.0-44.9, adult [E66.01, Z68.41] 02/18/2015 Not Applicable    Hypoalbuminemia [E88.09] 09/18/2015 Yes      Problems Resolved During this Admission:    Diagnosis Date Noted Date Resolved POA    PRINCIPAL PROBLEM:  Hypothermia [T68.XXXA] 01/16/2014 01/23/2018 Yes       Discharged Condition: fair    Disposition: Home-Health Care [facility]    Follow Up:    Patient Instructions:   No discharge procedures on file.    Significant Diagnostic Studies: Labs:   CMP   Recent Labs  Lab 01/21/18 1709 01/22/18  0411 01/23/18  0608    143 143   K 5.1 5.0 4.8    111* 111*   CO2 24 24 25   * 72 84   BUN 34* 33* 29*   CREATININE 1.6* 1.6* 1.4   CALCIUM 9.9 9.5 9.4   PROT 8.2  --   --    ALBUMIN 3.0*  --   --    BILITOT 0.3  --   --    ALKPHOS 170*  --   --    AST 29  --   --    ALT 37  --   --    ANIONGAP 10 8 7*   ESTGFRAFRICA 36.1* 36.1* 42.4*   EGFRNONAA 31.3* 31.3* 36.8*    and CBC   Recent Labs  Lab 01/21/18 1709 01/22/18  0411 01/23/18  0608   WBC 3.29* 3.17* 2.72*   HGB 12.0 10.2* 10.2*   HCT 36.3* 30.9* 32.2*    175 165       Pending Diagnostic Studies:     Procedure Component Value Units Date/Time    APTT [[id number]] Collected:  01/21/18 1709    Order Status:  Sent Lab Status:  In process Updated:  01/21/18 1710    Specimen:  Blood from Blood     Protime-INR [[id number]] Collected:  01/21/18 1709    Order Status:  Sent Lab Status:  In process Updated:  01/21/18 1710    Specimen:  Blood from Blood     Vitamin B1 [[id number]] Collected:  01/21/18 1709    Order Status:   "Sent Lab Status:  In process Updated:  01/21/18 6454    Specimen:  Blood from Blood          Medications:  Reconciled Home Medications:   Current Discharge Medication List      CONTINUE these medications which have NOT CHANGED    Details   ACCU-CHEK RAMIRO PLUS TEST STRP Strp TEST TWICE DAILY  Qty: 200 strip, Refills: 3      ACCU-CHEK SOFTCLIX LANCETS Misc Test twice daily      aspirin 81 MG Chew Take 81 mg by mouth once daily.        atorvastatin (LIPITOR) 40 MG tablet TAKE 1 TABLET EVERY EVENING  Qty: 90 tablet, Refills: 3      BD INSULIN SYRINGE ULTRA-FINE 1/2 mL 30 gauge x 1/2" Syrg USE EVERY NIGHT  Qty: 90 each, Refills: 3      clopidogrel (PLAVIX) 75 mg tablet TAKE 1 TABLET ONE TIME DAILY  Qty: 90 tablet, Refills: 3      diclofenac sodium 1 % Gel Apply 2 g topically nightly as needed.  Qty: 1 Tube, Refills: 0    Associated Diagnoses: Stage 3 chronic kidney disease due to type 2 diabetes mellitus; Uncontrolled type 2 diabetes mellitus with stage 3 chronic kidney disease, with long-term current use of insulin; Right shoulder pain, unspecified chronicity      econazole nitrate 1 % cream Apply 1 application topically once daily. Apply to affected area  Qty: 85 g, Refills: 2    Associated Diagnoses: Tinea pedis of both feet      ergocalciferol (ERGOCALCIFEROL) 50,000 unit Cap Take 1 capsule (50,000 Units total) by mouth every 7 days.  Qty: 12 capsule, Refills: 3      glimepiride (AMARYL) 1 MG tablet Take 2 tablets (2 mg total) by mouth with lunch.  Qty: 180 tablet, Refills: 3      hydroCHLOROthiazide (HYDRODIURIL) 25 MG tablet Take 1 tablet (25 mg total) by mouth once daily.  Qty: 30 tablet, Refills: 0      LANTUS 100 unit/mL injection INJECT 7 TO 10 UNITS SUBCUTANEOUSLY IN THE EVENING DEPENDING ON SCALE (DISCARD EACH VIAL AFTER 28 DAYS)  Qty: 30 mL, Refills: 3      triamcinolone acetonide 0.1% (KENALOG) 0.1 % cream APPLY TOPICALLY TO BOTH LEGS Q MONDAY AND FRIDAY  Refills: 1             Indwelling Lines/Drains at " time of discharge:   Lines/Drains/Airways     Drain            Female External Urinary Catheter 01/06/18 2100 16 days          Pressure Ulcer                 Pressure Ulcer Left heel Stage III -- days         Pressure Ulcer Right heel Stage III -- days         Pressure Ulcer 09/17/15 2221 Right heel Stage  days                Time spent on the discharge of patient: > 30  minutes  Patient was seen and examined on the date of discharge and determined to be suitable for discharge.         Vinayak Wiggins MD  Department of Hospital Medicine  [facility]

## 2018-01-23 NOTE — PROGRESS NOTES
Call from Dr. Burgos requesting wound care nurse to remove unna boots and not replace wound unna boots.    Met with pt and explained compression therapy and appropriateness. Pt refusing removal of unna boots and would prefer to have home health visit after discharge.  Explained to pt that home health does not typically go to patients home on date of discharge.  Pt continued to refuse.  Notified Dr. Burgos and Nurse Roman of patient's refusal. Nurse to call wound care nurse if needed. [id number]

## 2018-01-24 ENCOUNTER — TELEPHONE (OUTPATIENT)
Dept: INTERNAL MEDICINE | Facility: CLINIC | Age: 73
End: 2018-01-24

## 2018-01-24 ENCOUNTER — PATIENT OUTREACH (OUTPATIENT)
Dept: ADMINISTRATIVE | Facility: CLINIC | Age: 73
End: 2018-01-24

## 2018-01-24 ENCOUNTER — OUTPATIENT CASE MANAGEMENT (OUTPATIENT)
Dept: ADMINISTRATIVE | Facility: OTHER | Age: 73
End: 2018-01-24

## 2018-01-24 NOTE — PHYSICIAN QUERY
"PT Name: Sherin Ortiz  MR #: [id number]    Physician Query Form - Heart  Condition Clarification     Chacorta Lackey RN CDS    Contact Information: [phone]  This form is a permanent document in the medical record.     Query Date: January 24, 2018    By submitting this query, we are merely seeking further clarification of documentation. Please utilize your independent clinical judgment when addressing the question(s) below.    The medical record contains the following   Indicators     Supporting Clinical Findings Location in Medical Record   X BNP  Lab 1/21    EF     X Radiology findings Hazy opacification of the left lung base, greatest laterally which may be related to artifact versus trace pleural effusion and/or underlying atelectasis/infiltrate.   CXR 1/21    Echo Results      "Ascites" documented      "SOB" or "HANDY" documented      "Hypoxia" documented     X Heart Failure documented HFpEF) heart failure with preserved ejection fraction    MD Plan of Care 1/23, DC Summary 1/23   X "Edema" documented She exhibits edema H&P 1/22   X Diuretics/Meds hydroCHLOROthiazide (HYDRODIURIL) 25 MG tablet Take 1 tablet (25 mg total) by mouth once daily.    H&P 1/22 and DC summary 1/23    Treatment:      Other:          Provider, please specify diagnosis or diagnoses associated with above clinical findings.                               [  ] Chronic Diastolic Heart Failure (EF > 40)*  [ x  ] Other Type of Heart Failure (please specify type): ____heart failure with preserved ejection fraction ______  [  ] Heart Failure Ruled Out  [  ] Other (please specify): ___________________________________  [  ] Clinically Undetermined            *American Heart Association                                                                                                          Please document in your progress notes daily for the duration of treatment until resolved and include in your discharge summary.    "

## 2018-01-24 NOTE — PATIENT INSTRUCTIONS

## 2018-01-24 NOTE — PROGRESS NOTES
"1/24/2018  Review of EMR, noted recent admission for UTI.  Pt. Discharged to home on yesterday.  Patient is agreeable to follow up call today.  Pt. Reports feeling better. Pt. Reports that grandson is there and assisting her with needs.  Encouraged patient to ask for assistance as needed from family, patient reports good support with her needs.   Education reviewed with patient re UTI, prevention.  Discussed water intake, encouraged drinking water without drinking too much water, patient reports probably not getting enough water each day.   Encouraged patient to discuss with physician re water intake helping to prevent complications with health and other conditions.  Pt. Reports checking her glucose, this am was 130.  Pt. Reports taking medications this am.  Encouraged her compliance.  Pt. Reports HH is still following.  Encouraged patient to discuss needs with SN that visits, patient states she is doing "okay".  Pt. Offers no new needs/concerns at this time.  Will follow up next week.  Encouraged patient to call with any needs.      Follow up Plan    Collaboration with [facility]  Encourage medication compliance  Review diet  [facility] on call number    YRN Castellano      "

## 2018-01-26 ENCOUNTER — OFFICE VISIT (OUTPATIENT)
Dept: WOUND CARE | Facility: CLINIC | Age: 73
End: 2018-01-26
Payer: MEDICARE

## 2018-01-26 VITALS
TEMPERATURE: 97 F | DIASTOLIC BLOOD PRESSURE: 71 MMHG | WEIGHT: 293 LBS | HEART RATE: 75 BPM | SYSTOLIC BLOOD PRESSURE: 150 MMHG | BODY MASS INDEX: 45.99 KG/M2 | HEIGHT: 67 IN

## 2018-01-26 DIAGNOSIS — B35.3 TINEA PEDIS OF BOTH FEET: Chronic | ICD-10-CM

## 2018-01-26 DIAGNOSIS — I87.2 VENOUS STASIS DERMATITIS, UNSPECIFIED LATERALITY: Chronic | ICD-10-CM

## 2018-01-26 DIAGNOSIS — R60.0 BILATERAL LEG EDEMA: ICD-10-CM

## 2018-01-26 DIAGNOSIS — L97.912 ULCER OF RIGHT LOWER EXTREMITY WITH FAT LAYER EXPOSED: Primary | ICD-10-CM

## 2018-01-26 DIAGNOSIS — L97.921 ULCER OF LEFT LOWER EXTREMITY, LIMITED TO BREAKDOWN OF SKIN: ICD-10-CM

## 2018-01-26 DIAGNOSIS — L97.522 SKIN ULCER OF TOE OF LEFT FOOT WITH FAT LAYER EXPOSED: ICD-10-CM

## 2018-01-26 LAB
BACTERIA BLD CULT: NORMAL
BACTERIA BLD CULT: NORMAL

## 2018-01-26 PROCEDURE — 29580 STRAPPING UNNA BOOT: CPT | Mod: 50,S$GLB,, | Performed by: NURSE PRACTITIONER

## 2018-01-26 PROCEDURE — 99999 PR PBB SHADOW E&M-EST. PATIENT-LVL V: CPT | Mod: PBBFAC,,, | Performed by: NURSE PRACTITIONER

## 2018-01-26 PROCEDURE — 99499 UNLISTED E&M SERVICE: CPT | Mod: S$GLB,,, | Performed by: NURSE PRACTITIONER

## 2018-01-26 NOTE — PROGRESS NOTES
Subjective:       Patient ID: Sherin Ortiz is a 73 y.o. female.    Chief Complaint: Wound Check    Wound Check     Wound Check:   This patient is seen today for reevaluation of wounds to bilateral lower legs and the right second toe. An unna boot was on the legs but she removed it before coming to clinic today to shower. The wounds are slowly healing as evidenced by wound contracture.  Problems that interfere with wound healing include multiple co-morbidities, diabetes, edema, diabetic neuropathy and  morbid obesity. The patient is afebrile. The patient ambulates with great difficulty secondary to her body habitus.  She has no pain.  She denies increased swelling, redness or purulent drainage.   She is afebrile.   Review of Systems   Constitutional: Negative for chills, diaphoresis and fever.   HENT: Negative for hearing loss, postnasal drip, rhinorrhea, sinus pressure, sneezing, sore throat, tinnitus and trouble swallowing.    Eyes: Negative for visual disturbance.   Respiratory: Negative for apnea, cough, shortness of breath and wheezing.    Cardiovascular: Positive for leg swelling. Negative for chest pain and palpitations.   Gastrointestinal: Negative for constipation, diarrhea, nausea and vomiting.   Genitourinary: Negative for difficulty urinating, dysuria, frequency and hematuria.   Musculoskeletal: Negative for arthralgias, back pain and joint swelling.   Skin: Positive for wound.   Neurological: Negative for dizziness, weakness, light-headedness and headaches.   Hematological: Does not bruise/bleed easily.   Psychiatric/Behavioral: Negative for confusion, decreased concentration, dysphoric mood and sleep disturbance. The patient is not nervous/anxious.    All other systems reviewed and are negative.      Objective:      Physical Exam   Constitutional: She is oriented to person, place, and time. No distress.   Morbidly obese   HENT:   Head: Normocephalic and atraumatic.   Neck: Normal range of motion.  Neck supple.   Pulmonary/Chest: Effort normal.   Musculoskeletal: Normal range of motion. She exhibits edema. She exhibits no tenderness.        Legs:       Feet:    Neurological: She is alert and oriented to person, place, and time.   Skin: Skin is warm and dry. Rash (dermatitis and tinea bilateral lower extremities) noted. She is not diaphoretic. No cyanosis or erythema. No pallor. Nails show no clubbing.   Psychiatric: She has a normal mood and affect. Her behavior is normal. Judgment and thought content normal.   Nursing note and vitals reviewed.    ..  Hemoglobin A1C   Date Value Ref Range Status   10/09/2017 9.9 (H) 4.0 - 5.6 % Final     Comment:     According to ADA guidelines, hemoglobin A1c <7.0% represents  optimal control in non-pregnant diabetic patients. Different  metrics may apply to specific patient populations.   Standards of Medical Care in Diabetes-2016.  For the purpose of screening for the presence of diabetes:  <5.7%     Consistent with the absence of diabetes  5.7-6.4%  Consistent with increasing risk for diabetes   (prediabetes)  >or=6.5%  Consistent with diabetes  Currently, no consensus exists for use of hemoglobin A1c  for diagnosis of diabetes for children.  This Hemoglobin A1c assay has significant interference with fetal   hemoglobin   (HbF). The results are invalid for patients with abnormal amounts of   HbF,   including those with known Hereditary Persistence   of Fetal Hemoglobin. Heterozygous hemoglobin variants (HbAS, HbAC,   HbAD, HbAE, HbA2) do not significantly interfere with this assay;   however, presence of multiple variants in a sample may impact the %   interference.     07/06/2017 10.0 (H) 4.0 - 5.6 % Final     Comment:     According to ADA guidelines, hemoglobin A1c <7.0% represents  optimal control in non-pregnant diabetic patients. Different  metrics may apply to specific patient populations.   Standards of Medical Care in Diabetes-2016.  For the purpose of screening  for the presence of diabetes:  <5.7%     Consistent with the absence of diabetes  5.7-6.4%  Consistent with increasing risk for diabetes   (prediabetes)  >or=6.5%  Consistent with diabetes  Currently, no consensus exists for use of hemoglobin A1c  for diagnosis of diabetes for children.  This Hemoglobin A1c assay has significant interference with fetal   hemoglobin   (HbF). The results are invalid for patients with abnormal amounts of   HbF,   including those with known Hereditary Persistence   of Fetal Hemoglobin. Heterozygous hemoglobin variants (HbAS, HbAC,   HbAD, HbAE, HbA2) do not significantly interfere with this assay;   however, presence of multiple variants in a sample may impact the %   interference.     03/08/2017 9.6 (H) 4.5 - 6.2 % Final     Comment:     According to ADA guidelines, hemoglobin A1C <7.0% represents  optimal control in non-pregnant diabetic patients.  Different  metrics may apply to specific populations.   Standards of Medical Care in Diabetes - 2016.  For the purpose of screening for the presence of diabetes:  <5.7%     Consistent with the absence of diabetes  5.7-6.4%  Consistent with increasing risk for diabetes   (prediabetes)  >or=6.5%  Consistent with diabetes  Currently no consensus exists for use of hemoglobin A1C  for diagnosis of diabetes for children.       ..  Lab Results   Component Value Date    ALBUMIN 3.0 (L) 01/21/2018     Sherin was seen in the clinic room and placed in the supine position on the treatment table.  Both lower legs were cleansed with Easi-clense sponges and dried thoroughly.  A mepilex foam dressing was applied to the bilateral leg wounds.  Eucerin cream was applied to the lower legs.  The ankles were padded with ABD pads.  The patient's feet was positioned at a 90 degree angle.  A zinc oxide wrap, followed by kerlix roll gauze and coban were applied using a spiral technique avoiding creases or folds.  The wraps were started behind the first metatarsal and  ended below the tibial tubercle of the knee.  There was overlap of each turn half the width of the previous turn.  The compression wraps will be changed every 3-4 days.      Assessment:       1. Ulcer of right lower extremity with fat layer exposed    2. Ulcer of left lower extremity, limited to breakdown of skin    3. Skin ulcer of toe of left foot with fat layer exposed    4. Chronic venous hypertension with ulcer involving both sides    5. Tinea pedis of both feet    6. Venous stasis dermatitis, unspecified laterality    7. Bilateral leg edema        Plan:           Spectazole cream to feet and toes.  Triamcinolone cream to lower legs.  Unna boot bilateral lower legs as detailed above.  Patient was warned not to get the dressings wet and to use cast covers for showering.  Should the dressing become wet, she is to remove it, place a wet-to-dry dressing over the wound, cover with gauze and roll gauze and use ace wraps for compression and to secure bandages.  She should then notify home health as soon as possible to have a new dressing applied.  Apply medihoney gel and hydrofiber dressing to right second toe ulcer, cotton in between toes, cover with gauze and secure with roll gauze.  Change dressing twice weekly.  Return to clinic in 2 weeks.  [facility] notified of orders via email.      Home Health Wound Care Orders.  Cleanse wounds with wound .  Spectazole cream to feet and toes.  Triamcinolone cream to lower legs.  Mepilex foam dressing to bilateral leg wounds.  ABD pad to both ankles and right shin.  Unna boot (zinc oxide, kerlix and coban) bilateral lower legs.  Medihoney gel to left second toe ulcer and cover with hydrofiber, cotton in between toes, cover with gauze and secure with roll gauze.  Change toe dressing twice weekly.  Change leg wraps twice weekly.    Right lateral leg      Left medial leg      Right second toe

## 2018-01-30 ENCOUNTER — OFFICE VISIT (OUTPATIENT)
Dept: NEPHROLOGY | Facility: CLINIC | Age: 73
End: 2018-01-30
Payer: MEDICARE

## 2018-01-30 VITALS
HEIGHT: 67 IN | OXYGEN SATURATION: 99 % | SYSTOLIC BLOOD PRESSURE: 154 MMHG | HEART RATE: 64 BPM | DIASTOLIC BLOOD PRESSURE: 72 MMHG

## 2018-01-30 DIAGNOSIS — E66.01 MORBID OBESITY WITH BMI OF 40.0-44.9, ADULT: ICD-10-CM

## 2018-01-30 DIAGNOSIS — D63.8 ANEMIA OF CHRONIC DISEASE: ICD-10-CM

## 2018-01-30 DIAGNOSIS — E21.3 HYPERPARATHYROIDISM: ICD-10-CM

## 2018-01-30 PROCEDURE — 99214 OFFICE O/P EST MOD 30 MIN: CPT | Mod: S$GLB,,, | Performed by: INTERNAL MEDICINE

## 2018-01-30 PROCEDURE — 1126F AMNT PAIN NOTED NONE PRSNT: CPT | Mod: S$GLB,,, | Performed by: INTERNAL MEDICINE

## 2018-01-30 PROCEDURE — 3008F BODY MASS INDEX DOCD: CPT | Mod: S$GLB,,, | Performed by: INTERNAL MEDICINE

## 2018-01-30 PROCEDURE — 99499 UNLISTED E&M SERVICE: CPT | Mod: S$GLB,,, | Performed by: INTERNAL MEDICINE

## 2018-01-30 PROCEDURE — 1159F MED LIST DOCD IN RCRD: CPT | Mod: S$GLB,,, | Performed by: INTERNAL MEDICINE

## 2018-01-30 PROCEDURE — 99999 PR PBB SHADOW E&M-EST. PATIENT-LVL III: CPT | Mod: PBBFAC,,, | Performed by: INTERNAL MEDICINE

## 2018-01-30 RX ORDER — LISINOPRIL 10 MG/1
TABLET ORAL
Status: ON HOLD | COMMUNITY
Start: 2017-12-12 | End: 2019-03-27

## 2018-01-30 RX ORDER — FLUCONAZOLE 200 MG/1
TABLET ORAL
Status: ON HOLD | COMMUNITY
Start: 2017-12-31 | End: 2019-03-27

## 2018-01-30 RX ORDER — CEPHALEXIN 500 MG/1
CAPSULE ORAL
Refills: 0 | COMMUNITY
Start: 2018-01-08 | End: 2018-08-13

## 2018-01-30 RX ORDER — CIPROFLOXACIN 500 MG/1
TABLET ORAL
Refills: 0 | COMMUNITY
Start: 2017-12-26 | End: 2018-08-13

## 2018-01-30 RX ORDER — CEFDINIR 300 MG/1
CAPSULE ORAL
Refills: 0 | COMMUNITY
Start: 2017-12-15 | End: 2018-08-13

## 2018-01-30 NOTE — PROGRESS NOTES
,    Subjective:       Patient ID: Sherin Ortiz is a 73 y.o. White female who presents for new evaluation of CKD  The patient has a history of uncontrolled DM x 50 yrs with diabetic neuropathy and proliferative diabetic retinopathy (laser surgery), morbid obesity, HTN, diastolic dysfunction.   The patient has no family history of kidney disease. She also has a hx of kidney stones, she had an episode in January 2017 with right sided hydronephrosis and intervention (passend when stent was placed). The patient does not freuqently use NSAIDS or herbal supplements. Has not been to the eye doctor in a while. She had problems with her balance, she has bilateral leg ulcer and has her legs chronically rapped. She is not moving out of her wheelchair. She still takes care of her sister who is bedridden.  No more stone episodes, is not following a low salt diet. Treated for bilateral lower extremity wounds (rapped) and recently admitted for UTIs.     HPI  Review of Systems   Constitutional: Negative for activity change, appetite change, chills, fatigue, fever and unexpected weight change.   HENT: Negative.  Negative for nosebleeds.    Eyes: Negative.    Respiratory: Negative.  Negative for cough, chest tightness and shortness of breath.    Cardiovascular: Negative.  Negative for chest pain and leg swelling.   Gastrointestinal: Negative for abdominal pain, anal bleeding, diarrhea, nausea and vomiting.   Genitourinary: Negative for decreased urine volume, difficulty urinating, dysuria, flank pain, frequency, hematuria, pelvic pain, urgency and vaginal bleeding.        Nocturia 3-4 times   Musculoskeletal: Negative.  Negative for joint swelling and myalgias.   Skin: Negative.  Negative for rash.   Neurological: Positive for numbness (feet).   Psychiatric/Behavioral: Negative.    All other systems reviewed and are negative.      Objective:      Physical Exam   Constitutional: She is oriented to person, place, and time. She  appears well-developed and well-nourished. No distress.   HENT:   Head: Normocephalic and atraumatic.   Right Ear: External ear normal.   Left Ear: External ear normal.   Nose: Nose normal.   Mouth/Throat: Oropharynx is clear and moist.   Eyes: Conjunctivae and EOM are normal. Pupils are equal, round, and reactive to light.   Neck: Normal range of motion. Neck supple. No thyromegaly present.   Cardiovascular: Normal rate, regular rhythm, normal heart sounds and intact distal pulses.    Pulmonary/Chest: Effort normal and breath sounds normal. No respiratory distress. She has no wheezes. She has no rales. She exhibits no tenderness.   Abdominal: Soft. Normal appearance and bowel sounds are normal. She exhibits no distension. There is no tenderness. There is no rebound and no guarding.   Musculoskeletal: Normal range of motion. She exhibits no edema or tenderness.   Neurological: She is alert and oriented to person, place, and time. She has normal reflexes.   Skin: Skin is warm, dry and intact. She is not diaphoretic.   Psychiatric: She has a normal mood and affect. Her behavior is normal. Judgment and thought content normal.   Nursing note and vitals reviewed.      Assessment:       1. Uncontrolled type 2 diabetes mellitus with stage 3 chronic kidney disease, with long-term current use of insulin    2. Anemia of chronic disease    3. Hyperparathyroidism        Plan:       1. CKD3: With low degree of proteinuria in the past. Stable,mMost likely secondary to diabetic nephropathy (also has retinopathy and neuropathy. However, she also has had evidence of hydronephrosis in the past in an ultrasound with resolution in an follow up CT in 12/17.    - on low dose lisinopril (can not increase because of high potassium)  - on a statin     Advised to follow a low salt and low potassium diet.   - on thiazide  - again if recurrent stones will send for stone profile.     Lab Results   Component Value Date    CREATININE 1.4  01/23/2018     Protein Creatinine Ratios:    Prot/Creat Ratio, Ur   Date Value Ref Range Status   08/09/2017 0.50 (H) 0.00 - 0.20 Final   07/06/2017 0.15 0.00 - 0.20 Final   05/05/2017 0.44 (H) 0.00 - 0.20 Final     ·   ·   Acid-Base:  Hyperkalemia in the past, better now on thiazide. Hyperkalemia likely from mild type 4 RTA with diabetes  Lab Results   Component Value Date     01/23/2018    K 4.8 01/23/2018    CO2 25 01/23/2018     2. HTN: Blood pressures not well controlled. 155/72. Will start her on a thiazide diuretic, low dose also to help with the potassium.    In the 120's systolic at home.     3. Renal osteodystrophy: last PTH - continue ergocalciferol weekly, re-check with next visit.   Lab Results   Component Value Date    PTH 93.0 (H) 07/06/2017    CALCIUM 9.4 01/23/2018    CAION 1.22 12/15/2012    PHOS 2.7 01/23/2018       4. Anemia: stable  Lab Results   Component Value Date    HGB 10.2 (L) 01/23/2018        5. DM:  Last HbA1C not within target.   Lab Results   Component Value Date    HGBA1C 9.9 (H) 10/09/2017       6. Lipid management:   Lab Results   Component Value Date    LDLCALC 69.0 03/08/2017        Follow up in 6 month with labs

## 2018-02-01 ENCOUNTER — OUTPATIENT CASE MANAGEMENT (OUTPATIENT)
Dept: ADMINISTRATIVE | Facility: OTHER | Age: 73
End: 2018-02-01

## 2018-02-01 NOTE — PROGRESS NOTES
2/1/2018  Follow up call completed with patient this afternoon.  Reviewed recent appointment plans with patient.  Pt. Reports that wounds to legs are improving.  Pt. Reports applying creams to affected areas as prescribed.  Pt. Reports  SN visits on Tuesday and Friday for wound care encouraged patient's compliance with keeping dressings clean and dry.  Pt. Verbalized understanding.  Spoke with patient re hospital follow up appointment needed, patient reports that she is waiting on the office staff for available time slot.  Pt. Reports needing at least 7 day's to schedule her transportation.  Reviewed with patient [employer] transportation benefit available to her, and contact number to schedule a , patient states she is aware and is good with her current arrangements for transportation.  Encouraged patient to follow up with PCP office if she does not hear back from them.  Pt. Reports no new needs, agreeable to follow up call next week.      Follow up Plan  [facility] education - DM manual sent will review   Encourage glucose log  Encourage medication compliance  Review diet  Encourage DM, low salt, low potassium diet    YRN Castellano

## 2018-02-08 ENCOUNTER — OUTPATIENT CASE MANAGEMENT (OUTPATIENT)
Dept: ADMINISTRATIVE | Facility: OTHER | Age: 73
End: 2018-02-08

## 2018-02-08 NOTE — PROGRESS NOTES
2/8/2018  1st Attempt to complete follow-up for Outpatient Care Management; left message requesting return call.  YRN Castellano

## 2018-02-15 ENCOUNTER — OUTPATIENT CASE MANAGEMENT (OUTPATIENT)
Dept: ADMINISTRATIVE | Facility: OTHER | Age: 73
End: 2018-02-15

## 2018-02-15 NOTE — PROGRESS NOTES
2/15/2018  Second attempt to complete follow up for Outpatient Care Management, left message requesting a return call.  YRN Castellano

## 2018-02-16 ENCOUNTER — OFFICE VISIT (OUTPATIENT)
Dept: WOUND CARE | Facility: CLINIC | Age: 73
End: 2018-02-16
Payer: MEDICARE

## 2018-02-16 VITALS
DIASTOLIC BLOOD PRESSURE: 58 MMHG | SYSTOLIC BLOOD PRESSURE: 130 MMHG | HEIGHT: 67 IN | BODY MASS INDEX: 45.99 KG/M2 | HEART RATE: 62 BPM | TEMPERATURE: 97 F | WEIGHT: 293 LBS

## 2018-02-16 DIAGNOSIS — I87.2 VENOUS INSUFFICIENCY OF BOTH LOWER EXTREMITIES: Chronic | ICD-10-CM

## 2018-02-16 DIAGNOSIS — L97.921 ULCER OF LEFT LOWER EXTREMITY, LIMITED TO BREAKDOWN OF SKIN: Primary | ICD-10-CM

## 2018-02-16 DIAGNOSIS — R60.0 BILATERAL LEG EDEMA: ICD-10-CM

## 2018-02-16 DIAGNOSIS — L97.512 SKIN ULCER OF TOE OF RIGHT FOOT WITH FAT LAYER EXPOSED: ICD-10-CM

## 2018-02-16 DIAGNOSIS — L97.912 ULCER OF RIGHT LOWER EXTREMITY WITH FAT LAYER EXPOSED: ICD-10-CM

## 2018-02-16 DIAGNOSIS — I87.2 VENOUS STASIS DERMATITIS, UNSPECIFIED LATERALITY: Chronic | ICD-10-CM

## 2018-02-16 DIAGNOSIS — B35.3 TINEA PEDIS OF BOTH FEET: Chronic | ICD-10-CM

## 2018-02-16 PROBLEM — L97.522 SKIN ULCER OF TOE OF LEFT FOOT WITH FAT LAYER EXPOSED: Status: RESOLVED | Noted: 2017-11-14 | Resolved: 2018-02-16

## 2018-02-16 PROCEDURE — 99499 UNLISTED E&M SERVICE: CPT | Mod: S$GLB,,, | Performed by: NURSE PRACTITIONER

## 2018-02-16 PROCEDURE — 29580 STRAPPING UNNA BOOT: CPT | Mod: RT,S$GLB,, | Performed by: NURSE PRACTITIONER

## 2018-02-16 PROCEDURE — 99999 PR PBB SHADOW E&M-EST. PATIENT-LVL V: CPT | Mod: PBBFAC,,, | Performed by: NURSE PRACTITIONER

## 2018-02-16 NOTE — PROGRESS NOTES
Subjective:       Patient ID: Sherin Ortiz is a 73 y.o. female.    Chief Complaint: Wound Check    Wound Check     Wound Check:   This patient is seen today for reevaluation of wounds to bilateral lower legs and the right second toe. An unna boot was on the legs but she removed it before coming to clinic today to shower. The wounds are slowly healing as evidenced by wound contracture.  Problems that interfere with wound healing include multiple co-morbidities, diabetes, edema, diabetic neuropathy and  morbid obesity. The patient is afebrile. The patient ambulates with great difficulty secondary to her body habitus.  She has no pain.  She denies increased swelling, redness or purulent drainage.   She is afebrile.   Review of Systems   Constitutional: Negative for chills, diaphoresis and fever.   HENT: Negative for hearing loss, postnasal drip, rhinorrhea, sinus pressure, sneezing, sore throat, tinnitus and trouble swallowing.    Eyes: Negative for visual disturbance.   Respiratory: Negative for apnea, cough, shortness of breath and wheezing.    Cardiovascular: Positive for leg swelling. Negative for chest pain and palpitations.   Gastrointestinal: Negative for constipation, diarrhea, nausea and vomiting.   Genitourinary: Negative for difficulty urinating, dysuria, frequency and hematuria.   Musculoskeletal: Negative for arthralgias, back pain and joint swelling.   Skin: Positive for wound.   Neurological: Negative for dizziness, weakness, light-headedness and headaches.   Hematological: Does not bruise/bleed easily.   Psychiatric/Behavioral: Negative for confusion, decreased concentration, dysphoric mood and sleep disturbance. The patient is not nervous/anxious.    All other systems reviewed and are negative.      Objective:      Physical Exam   Constitutional: She is oriented to person, place, and time. No distress.   Morbidly obese   HENT:   Head: Normocephalic and atraumatic.   Neck: Normal range of motion.  Neck supple.   Pulmonary/Chest: Effort normal.   Musculoskeletal: Normal range of motion. She exhibits edema. She exhibits no tenderness.        Legs:       Feet:    Neurological: She is alert and oriented to person, place, and time.   Skin: Skin is warm and dry. Rash (dermatitis and tinea bilateral lower extremities) noted. She is not diaphoretic. No cyanosis or erythema. No pallor. Nails show no clubbing.   Psychiatric: She has a normal mood and affect. Her behavior is normal. Judgment and thought content normal.   Nursing note and vitals reviewed.    ..  Hemoglobin A1C   Date Value Ref Range Status   10/09/2017 9.9 (H) 4.0 - 5.6 % Final     Comment:     According to ADA guidelines, hemoglobin A1c <7.0% represents  optimal control in non-pregnant diabetic patients. Different  metrics may apply to specific patient populations.   Standards of Medical Care in Diabetes-2016.  For the purpose of screening for the presence of diabetes:  <5.7%     Consistent with the absence of diabetes  5.7-6.4%  Consistent with increasing risk for diabetes   (prediabetes)  >or=6.5%  Consistent with diabetes  Currently, no consensus exists for use of hemoglobin A1c  for diagnosis of diabetes for children.  This Hemoglobin A1c assay has significant interference with fetal   hemoglobin   (HbF). The results are invalid for patients with abnormal amounts of   HbF,   including those with known Hereditary Persistence   of Fetal Hemoglobin. Heterozygous hemoglobin variants (HbAS, HbAC,   HbAD, HbAE, HbA2) do not significantly interfere with this assay;   however, presence of multiple variants in a sample may impact the %   interference.     07/06/2017 10.0 (H) 4.0 - 5.6 % Final     Comment:     According to ADA guidelines, hemoglobin A1c <7.0% represents  optimal control in non-pregnant diabetic patients. Different  metrics may apply to specific patient populations.   Standards of Medical Care in Diabetes-2016.  For the purpose of screening  for the presence of diabetes:  <5.7%     Consistent with the absence of diabetes  5.7-6.4%  Consistent with increasing risk for diabetes   (prediabetes)  >or=6.5%  Consistent with diabetes  Currently, no consensus exists for use of hemoglobin A1c  for diagnosis of diabetes for children.  This Hemoglobin A1c assay has significant interference with fetal   hemoglobin   (HbF). The results are invalid for patients with abnormal amounts of   HbF,   including those with known Hereditary Persistence   of Fetal Hemoglobin. Heterozygous hemoglobin variants (HbAS, HbAC,   HbAD, HbAE, HbA2) do not significantly interfere with this assay;   however, presence of multiple variants in a sample may impact the %   interference.     03/08/2017 9.6 (H) 4.5 - 6.2 % Final     Comment:     According to ADA guidelines, hemoglobin A1C <7.0% represents  optimal control in non-pregnant diabetic patients.  Different  metrics may apply to specific populations.   Standards of Medical Care in Diabetes - 2016.  For the purpose of screening for the presence of diabetes:  <5.7%     Consistent with the absence of diabetes  5.7-6.4%  Consistent with increasing risk for diabetes   (prediabetes)  >or=6.5%  Consistent with diabetes  Currently no consensus exists for use of hemoglobin A1C  for diagnosis of diabetes for children.       ..  Lab Results   Component Value Date    ALBUMIN 3.0 (L) 01/21/2018     Sherin was seen in the clinic room and placed in the supine position on the treatment table.  The right leg was cleansed with Easi-clense sponges and dried thoroughly.  Medihoney gel and a hydrofiber dressing was applied to the wound.  Eucerin cream was applied to the lower legs.  The patient's foot was positioned at a 90 degree angle.  A zinc oxide wrap, followed by kerlix roll gauze and coban were applied using a spiral technique avoiding creases or folds.  The wrap was started behind the first metatarsal and ended below the tibial tubercle of the  knee.  There was overlap of each turn half the width of the previous turn.  The compression wrap will be changed every 3-4 days.    Assessment:       1. Ulcer of left lower extremity, limited to breakdown of skin    2. Ulcer of right lower extremity with fat layer exposed    3. Skin ulcer of toe of right foot with fat layer exposed    4. Venous insufficiency of both lower extremities    5. Tinea pedis of both feet    6. Venous stasis dermatitis, unspecified laterality    7. Bilateral leg edema        Plan:           Spectazole cream to feet and toes.  Triamcinolone cream to lower legs.  Unna boot right lower leg as detailed above.  Kerlix and coban left lower leg for compression  Patient was warned not to get the dressings wet and to use cast covers for showering.  Should the dressing become wet, she is to remove it, place a wet-to-dry dressing over the wound, cover with gauze and roll gauze and use ace wraps for compression and to secure bandages.  She should then notify home health as soon as possible to have a new dressing applied.  Apply medihoney gel and hydrofiber dressing to right second toe ulcer, cotton in between toes, cover with gauze and secure with roll gauze.  Change dressing twice weekly.  Return to clinic in 2 weeks.  [facility] notified of orders via email.      Home Health Wound Care Orders.  Cleanse wounds with wound .  Spectazole cream to feet and toes.  Triamcinolone cream to lower legs.  Medihoney gel and a hydrofiber dressing to right leg wound.  ABD pad to both ankles and right shin.  Unna boot (zinc oxide, kerlix and coban) bilateral lower legs.  Medihoney gel to left second toe ulcer and cover with hydrofiber, cotton in between toes, cover with gauze and secure with roll gauze.  Change toe dressing twice weekly.  Change leg wraps twice weekly.    Left medial leg      Right second toe

## 2018-02-20 ENCOUNTER — OUTPATIENT CASE MANAGEMENT (OUTPATIENT)
Dept: ADMINISTRATIVE | Facility: OTHER | Age: 73
End: 2018-02-20

## 2018-02-20 NOTE — PROGRESS NOTES
2/20/2018  3rd Attempt to complete follow up for Outpatient Care Management, called and left vm.  I will also mail a letter with my contact information requesting a return call.  YRN Castellano

## 2018-02-20 NOTE — LETTER
February 20, 2018    Sherin Ortiz  [address] [facility] [address] [place] [address]             [facility] [address] [place] [address] Dear Ms. Sherin Ortiz:        I work with [facility]'s Outpatient Case Management Department. I have been unsuccessful at reaching you to follow-up to see how you have been doing.  If you require any future assistance or if any new concerns or problems arise, please do not hesitate to call.      The Outpatient Case Management Department can be reached at [phone] from 8:00AM to 4:30PM on Monday thru Friday.  [facility] also has a program where a nurse is available 24/7 to answer questions or provide medical advice, their number is [phone].     Thanks,      YRN Castellano  Out Patient Complex Case Management

## 2018-02-23 ENCOUNTER — OUTPATIENT CASE MANAGEMENT (OUTPATIENT)
Dept: ADMINISTRATIVE | Facility: OTHER | Age: 73
End: 2018-02-23

## 2018-02-23 NOTE — PROGRESS NOTES
2/23/2018  Attempted again to reach patient by phone, no answer and unable to leave message.  Letter already sent to patient requesting a return call.  Will close case at time.  YRN Castellano

## 2018-03-05 RX ORDER — INSULIN GLARGINE 100 [IU]/ML
INJECTION, SOLUTION SUBCUTANEOUS
Qty: 30 ML | Refills: 3 | Status: SHIPPED | OUTPATIENT
Start: 2018-03-05 | End: 2019-03-11 | Stop reason: SDUPTHER

## 2018-03-06 ENCOUNTER — OFFICE VISIT (OUTPATIENT)
Dept: WOUND CARE | Facility: CLINIC | Age: 73
End: 2018-03-06
Payer: MEDICARE

## 2018-03-06 VITALS
HEIGHT: 67 IN | DIASTOLIC BLOOD PRESSURE: 85 MMHG | WEIGHT: 293 LBS | HEART RATE: 71 BPM | SYSTOLIC BLOOD PRESSURE: 179 MMHG | BODY MASS INDEX: 45.99 KG/M2 | TEMPERATURE: 97 F

## 2018-03-06 DIAGNOSIS — I87.2 VENOUS STASIS DERMATITIS OF BOTH LOWER EXTREMITIES: Chronic | ICD-10-CM

## 2018-03-06 DIAGNOSIS — L97.512 SKIN ULCER OF TOE OF RIGHT FOOT WITH FAT LAYER EXPOSED: ICD-10-CM

## 2018-03-06 DIAGNOSIS — L97.921 ULCER OF LEFT LOWER EXTREMITY, LIMITED TO BREAKDOWN OF SKIN: Primary | ICD-10-CM

## 2018-03-06 DIAGNOSIS — I87.2 VENOUS INSUFFICIENCY OF BOTH LOWER EXTREMITIES: Chronic | ICD-10-CM

## 2018-03-06 DIAGNOSIS — B35.3 TINEA PEDIS OF BOTH FEET: Chronic | ICD-10-CM

## 2018-03-06 DIAGNOSIS — R60.0 BILATERAL LEG EDEMA: ICD-10-CM

## 2018-03-06 PROCEDURE — 99999 PR PBB SHADOW E&M-EST. PATIENT-LVL V: CPT | Mod: PBBFAC,,, | Performed by: NURSE PRACTITIONER

## 2018-03-06 PROCEDURE — 3077F SYST BP >= 140 MM HG: CPT | Mod: S$GLB,,, | Performed by: NURSE PRACTITIONER

## 2018-03-06 PROCEDURE — 99212 OFFICE O/P EST SF 10 MIN: CPT | Mod: S$GLB,,, | Performed by: NURSE PRACTITIONER

## 2018-03-06 PROCEDURE — 3079F DIAST BP 80-89 MM HG: CPT | Mod: S$GLB,,, | Performed by: NURSE PRACTITIONER

## 2018-03-06 NOTE — PROGRESS NOTES
Subjective:       Patient ID: Sherin Ortiz is a 73 y.o. female.    Chief Complaint: Wound Check    Wound Check     Wound Check:   This patient is seen today for reevaluation of wounds to the left lower leg and the right second toe. An unna boot was on the legs but she removed it before coming to clinic today to shower. The left leg wound is healed.  The right second toe wound is healing as evidenced by wound contracture, but she has a new wound which appears to have started as a blister just distal to this wound.  Problems that interfere with wound healing include multiple co-morbidities, diabetes, edema, diabetic neuropathy and  morbid obesity. The patient is afebrile. The patient ambulates with great difficulty secondary to her body habitus.  She has no pain.  She denies increased swelling, redness or purulent drainage.   She is afebrile.   Review of Systems   Constitutional: Negative for chills, diaphoresis and fever.   HENT: Negative for hearing loss, postnasal drip, rhinorrhea, sinus pressure, sneezing, sore throat, tinnitus and trouble swallowing.    Eyes: Negative for visual disturbance.   Respiratory: Negative for apnea, cough, shortness of breath and wheezing.    Cardiovascular: Positive for leg swelling. Negative for chest pain and palpitations.   Gastrointestinal: Negative for constipation, diarrhea, nausea and vomiting.   Genitourinary: Negative for difficulty urinating, dysuria, frequency and hematuria.   Musculoskeletal: Negative for arthralgias, back pain and joint swelling.   Skin: Positive for wound.   Neurological: Negative for dizziness, weakness, light-headedness and headaches.   Hematological: Does not bruise/bleed easily.   Psychiatric/Behavioral: Negative for confusion, decreased concentration, dysphoric mood and sleep disturbance. The patient is not nervous/anxious.    All other systems reviewed and are negative.      Objective:      Physical Exam   Constitutional: She is oriented to  person, place, and time. No distress.   Morbidly obese   HENT:   Head: Normocephalic and atraumatic.   Neck: Normal range of motion. Neck supple.   Pulmonary/Chest: Effort normal.   Musculoskeletal: Normal range of motion. She exhibits edema. She exhibits no tenderness.        Legs:       Feet:    Neurological: She is alert and oriented to person, place, and time.   Skin: Skin is warm and dry. Rash (dermatitis and tinea bilateral lower extremities) noted. She is not diaphoretic. No cyanosis or erythema. No pallor. Nails show no clubbing.   Psychiatric: She has a normal mood and affect. Her behavior is normal. Judgment and thought content normal.   Nursing note and vitals reviewed.    ..  Hemoglobin A1C   Date Value Ref Range Status   10/09/2017 9.9 (H) 4.0 - 5.6 % Final     Comment:     According to ADA guidelines, hemoglobin A1c <7.0% represents  optimal control in non-pregnant diabetic patients. Different  metrics may apply to specific patient populations.   Standards of Medical Care in Diabetes-2016.  For the purpose of screening for the presence of diabetes:  <5.7%     Consistent with the absence of diabetes  5.7-6.4%  Consistent with increasing risk for diabetes   (prediabetes)  >or=6.5%  Consistent with diabetes  Currently, no consensus exists for use of hemoglobin A1c  for diagnosis of diabetes for children.  This Hemoglobin A1c assay has significant interference with fetal   hemoglobin   (HbF). The results are invalid for patients with abnormal amounts of   HbF,   including those with known Hereditary Persistence   of Fetal Hemoglobin. Heterozygous hemoglobin variants (HbAS, HbAC,   HbAD, HbAE, HbA2) do not significantly interfere with this assay;   however, presence of multiple variants in a sample may impact the %   interference.     07/06/2017 10.0 (H) 4.0 - 5.6 % Final     Comment:     According to ADA guidelines, hemoglobin A1c <7.0% represents  optimal control in non-pregnant diabetic patients.  Different  metrics may apply to specific patient populations.   Standards of Medical Care in Diabetes-2016.  For the purpose of screening for the presence of diabetes:  <5.7%     Consistent with the absence of diabetes  5.7-6.4%  Consistent with increasing risk for diabetes   (prediabetes)  >or=6.5%  Consistent with diabetes  Currently, no consensus exists for use of hemoglobin A1c  for diagnosis of diabetes for children.  This Hemoglobin A1c assay has significant interference with fetal   hemoglobin   (HbF). The results are invalid for patients with abnormal amounts of   HbF,   including those with known Hereditary Persistence   of Fetal Hemoglobin. Heterozygous hemoglobin variants (HbAS, HbAC,   HbAD, HbAE, HbA2) do not significantly interfere with this assay;   however, presence of multiple variants in a sample may impact the %   interference.     03/08/2017 9.6 (H) 4.5 - 6.2 % Final     Comment:     According to ADA guidelines, hemoglobin A1C <7.0% represents  optimal control in non-pregnant diabetic patients.  Different  metrics may apply to specific populations.   Standards of Medical Care in Diabetes - 2016.  For the purpose of screening for the presence of diabetes:  <5.7%     Consistent with the absence of diabetes  5.7-6.4%  Consistent with increasing risk for diabetes   (prediabetes)  >or=6.5%  Consistent with diabetes  Currently no consensus exists for use of hemoglobin A1C  for diagnosis of diabetes for children.       ..  Lab Results   Component Value Date    ALBUMIN 3.0 (L) 01/21/2018       Assessment:       1. Ulcer of left lower extremity, limited to breakdown of skin    2. Skin ulcer of toe of right foot with fat layer exposed    3. Tinea pedis of both feet    4. Venous insufficiency of both lower extremities    5. Venous stasis dermatitis of both lower extremities    6. Bilateral leg edema        Plan:           Cleanse toe wounds with wound cleanser.  Spectazole cream to feet and  toes.  Triamcinolone cream to lower legs.  Pad ankles with ABD pads.  Kerlix and coban bilateral lower legs.  Apply medihoney gel and hydrofiber dressing to right second toe ulcer, cotton in between toes, cover with gauze and secure with roll gauze.  Change dressings twice weekly.  Prescription given for knee high compression hose.  Return to clinic in 3 weeks.  [facility] notified of orders via email.    Left medial leg      Right second toe

## 2018-04-11 DIAGNOSIS — E11.9 TYPE 2 DIABETES MELLITUS WITHOUT COMPLICATION: ICD-10-CM

## 2018-04-17 NOTE — PT/OT/SLP DISCHARGE
Occupational Therapy Discharge Summary    Sherin Ortiz  MRN: [id number]   Principal Problem: Sepsis      Patient Discharged from acute Occupational Therapy on 1/8/18.  Please refer to prior OT note dated 1/8/18 for functional status.    Assessment:      Patient was discharged unexpectedly.  Information required to complete an accurate discharge summary is unknown.  Refer to therapy initial evaluation and last progress note for initial and most recent functional status and goal achievement.  Recommendations made may be found in medical record.    Objective:     GOALS:    Occupational Therapy Goals        Problem: Occupational Therapy Goal    Goal Priority Disciplines Outcome Interventions   Occupational Therapy Goal     OT, PT/OT Ongoing (interventions implemented as appropriate)    Description:  Goals to be met by: 1/15/18    Patient will increase functional independence with ADLs by performing:    UE Dressing with Set-up Assistance.  LE Dressing with Supervision and Assistive Devices as needed.  Rolling to Bilateral with Supervision.   Supine to sit with Supervision.  Stand pivot transfers with Minimal Assistance.  Toilet transfer to toilet with Minimal Assistance.                      Reasons for Discontinuation of Therapy Services  Transfer to alternate level of care.      Plan:     Patient Discharged to: Home with Home Health Service    AGUILAR Ruggiero  4/17/2018

## 2018-04-18 DIAGNOSIS — E11.9 TYPE 2 DIABETES MELLITUS WITHOUT COMPLICATION: ICD-10-CM

## 2018-05-03 ENCOUNTER — PES CALL (OUTPATIENT)
Dept: ADMINISTRATIVE | Facility: CLINIC | Age: 73
End: 2018-05-03

## 2018-05-06 RX ORDER — HYDROCHLOROTHIAZIDE 25 MG/1
TABLET ORAL
Qty: 90 TABLET | Refills: 0 | OUTPATIENT
Start: 2018-05-06

## 2018-05-10 ENCOUNTER — OFFICE VISIT (OUTPATIENT)
Dept: WOUND CARE | Facility: CLINIC | Age: 73
End: 2018-05-10
Payer: MEDICARE

## 2018-05-10 VITALS
TEMPERATURE: 97 F | DIASTOLIC BLOOD PRESSURE: 77 MMHG | SYSTOLIC BLOOD PRESSURE: 137 MMHG | HEIGHT: 67 IN | HEART RATE: 86 BPM

## 2018-05-10 DIAGNOSIS — L97.911 ULCER OF RIGHT LOWER EXTREMITY, LIMITED TO BREAKDOWN OF SKIN: Primary | ICD-10-CM

## 2018-05-10 DIAGNOSIS — I87.2 VENOUS INSUFFICIENCY OF BOTH LOWER EXTREMITIES: Chronic | ICD-10-CM

## 2018-05-10 DIAGNOSIS — T31.10 BURN ANY DEGREE INVOLVING 10-19 PERCENT OF BODY SURFACE: ICD-10-CM

## 2018-05-10 DIAGNOSIS — L97.512 SKIN ULCER OF TOE OF RIGHT FOOT WITH FAT LAYER EXPOSED: ICD-10-CM

## 2018-05-10 DIAGNOSIS — B35.3 TINEA PEDIS OF BOTH FEET: Chronic | ICD-10-CM

## 2018-05-10 DIAGNOSIS — L97.521 SKIN ULCER OF LEFT FOOT, LIMITED TO BREAKDOWN OF SKIN: ICD-10-CM

## 2018-05-10 DIAGNOSIS — R60.0 BILATERAL LEG EDEMA: ICD-10-CM

## 2018-05-10 DIAGNOSIS — L97.921 ULCER OF LEFT LOWER EXTREMITY, LIMITED TO BREAKDOWN OF SKIN: ICD-10-CM

## 2018-05-10 DIAGNOSIS — I87.2 VENOUS STASIS DERMATITIS OF BOTH LOWER EXTREMITIES: Chronic | ICD-10-CM

## 2018-05-10 PROCEDURE — 3075F SYST BP GE 130 - 139MM HG: CPT | Mod: CPTII,S$GLB,, | Performed by: NURSE PRACTITIONER

## 2018-05-10 PROCEDURE — 3078F DIAST BP <80 MM HG: CPT | Mod: CPTII,S$GLB,, | Performed by: NURSE PRACTITIONER

## 2018-05-10 PROCEDURE — 29580 STRAPPING UNNA BOOT: CPT | Mod: 50,S$GLB,, | Performed by: NURSE PRACTITIONER

## 2018-05-10 PROCEDURE — 99212 OFFICE O/P EST SF 10 MIN: CPT | Mod: 25,S$GLB,, | Performed by: NURSE PRACTITIONER

## 2018-05-10 PROCEDURE — 99999 PR PBB SHADOW E&M-EST. PATIENT-LVL V: CPT | Mod: PBBFAC,,, | Performed by: NURSE PRACTITIONER

## 2018-05-10 RX ORDER — ACETAMINOPHEN 500MG/15ML
LIQUID (ML) ORAL
Status: ON HOLD | COMMUNITY
Start: 2018-05-01 | End: 2019-03-27
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
## 2018-05-10 NOTE — PATIENT INSTRUCTIONS
Elevate legs as much as possible. Do not get the dressings wet and use cast covers for showering.  Should the dressing become wet, remove it, place a wet-to-dry dressing over the wound, cover with gauze and roll gauze and use ace wraps for compression and to secure bandages.  Notify this office as soon as possible to have a new dressing applied. Do not remove your bandages.  We will remove them when you come to clinic and bathe your legs.    You may shower using a mild soap such as Dove.  Irrigate the left third finger wound with lukewarm water for 5 minutes and dry thoroughly.  Apply medihoney gel to the wound, cover with cotton gauze and secure with paper tape or coban.  Change dressing daily.  Report any signs of infection.

## 2018-05-11 ENCOUNTER — TELEPHONE (OUTPATIENT)
Dept: INTERNAL MEDICINE | Facility: CLINIC | Age: 73
End: 2018-05-11

## 2018-05-11 DIAGNOSIS — T14.8XXA WOUND INFECTION: Primary | ICD-10-CM

## 2018-05-11 DIAGNOSIS — L08.9 WOUND INFECTION: Primary | ICD-10-CM

## 2018-05-11 RX ORDER — PEN NEEDLE, DIABETIC 29 G X1/2"
NEEDLE, DISPOSABLE MISCELLANEOUS
Qty: 90 EACH | Refills: 3 | Status: SHIPPED | OUTPATIENT
Start: 2018-05-11 | End: 2019-07-17 | Stop reason: SDUPTHER

## 2018-05-11 NOTE — TELEPHONE ENCOUNTER
----- Message from Tenzin Sorensen sent at 5/10/2018 10:52 AM CDT -----  Contact: self [phone]  Patient requesting a call back to discuss getting order for [facility] home health care . Please advise, Thanks

## 2018-05-11 NOTE — TELEPHONE ENCOUNTER
----- Message from Wesley Russell sent at 5/11/2018  9:08 AM CDT -----  Contact: self/[phone]  Type: Returning a call    Who left a message? Siria    When did the practice call? Today     Comments: Pt is returning a call from the office. Please advise.      Thanks

## 2018-05-11 NOTE — TELEPHONE ENCOUNTER
The patient wants to know can you give her a referral for home health.     Please advise, thank you

## 2018-05-14 RX ORDER — ERGOCALCIFEROL 1.25 MG/1
50000 CAPSULE ORAL
Qty: 12 CAPSULE | Refills: 3 | Status: ON HOLD | OUTPATIENT
Start: 2018-05-14 | End: 2019-03-27

## 2018-05-14 RX ORDER — HYDROCHLOROTHIAZIDE 25 MG/1
25 TABLET ORAL DAILY
Qty: 30 TABLET | Refills: 3 | Status: SHIPPED | OUTPATIENT
Start: 2018-05-14 | End: 2018-05-17 | Stop reason: SDUPTHER

## 2018-05-14 NOTE — TELEPHONE ENCOUNTER
"----- Message from Shelby Bullock sent at 5/14/2018  1:45 PM CDT -----  Contact: Self Call.  [phone]  Checking on the refill status of her requested medications;    1.ergocalciferol (ERGOCALCIFEROL) 50,000 unit Cap      2. hydroCHLOROthiazide (HYDRODIURIL) 25 MG tablet       3.BD INSULIN SYRINGE ULTRA-FINE 1/2 mL 30 gauge x 1/2" Syr      [facility] [phone] (Phone) or [phone] (Fax)  "

## 2018-05-17 DIAGNOSIS — N18.30 CHRONIC RENAL DISEASE, STAGE III: Primary | ICD-10-CM

## 2018-05-17 RX ORDER — HYDROCHLOROTHIAZIDE 25 MG/1
25 TABLET ORAL DAILY
Qty: 30 TABLET | Refills: 3 | Status: ON HOLD | OUTPATIENT
Start: 2018-05-17 | End: 2019-03-27

## 2018-05-25 ENCOUNTER — OFFICE VISIT (OUTPATIENT)
Dept: WOUND CARE | Facility: CLINIC | Age: 73
End: 2018-05-25
Payer: MEDICARE

## 2018-05-25 VITALS
WEIGHT: 293 LBS | HEART RATE: 76 BPM | SYSTOLIC BLOOD PRESSURE: 155 MMHG | HEIGHT: 67 IN | BODY MASS INDEX: 45.99 KG/M2 | TEMPERATURE: 98 F | DIASTOLIC BLOOD PRESSURE: 80 MMHG

## 2018-05-25 DIAGNOSIS — I87.2 VENOUS INSUFFICIENCY OF BOTH LOWER EXTREMITIES: Chronic | ICD-10-CM

## 2018-05-25 DIAGNOSIS — L97.911 ULCER OF RIGHT LOWER EXTREMITY, LIMITED TO BREAKDOWN OF SKIN: ICD-10-CM

## 2018-05-25 DIAGNOSIS — I87.2 VENOUS STASIS DERMATITIS OF BOTH LOWER EXTREMITIES: Chronic | ICD-10-CM

## 2018-05-25 DIAGNOSIS — L97.921 ULCER OF LEFT LOWER EXTREMITY, LIMITED TO BREAKDOWN OF SKIN: Primary | ICD-10-CM

## 2018-05-25 DIAGNOSIS — L97.521 SKIN ULCER OF LEFT FOOT, LIMITED TO BREAKDOWN OF SKIN: ICD-10-CM

## 2018-05-25 DIAGNOSIS — T31.10 BURN ANY DEGREE INVOLVING 10-19 PERCENT OF BODY SURFACE: ICD-10-CM

## 2018-05-25 DIAGNOSIS — B35.3 TINEA PEDIS OF BOTH FEET: Chronic | ICD-10-CM

## 2018-05-25 DIAGNOSIS — R60.0 BILATERAL LEG EDEMA: ICD-10-CM

## 2018-05-25 DIAGNOSIS — L97.512 SKIN ULCER OF TOE OF RIGHT FOOT WITH FAT LAYER EXPOSED: ICD-10-CM

## 2018-05-25 PROCEDURE — 29580 STRAPPING UNNA BOOT: CPT | Mod: 50,S$GLB,, | Performed by: NURSE PRACTITIONER

## 2018-05-25 PROCEDURE — 99499 UNLISTED E&M SERVICE: CPT | Mod: S$GLB,,, | Performed by: NURSE PRACTITIONER

## 2018-05-25 PROCEDURE — 99999 PR PBB SHADOW E&M-EST. PATIENT-LVL V: CPT | Mod: PBBFAC,,, | Performed by: NURSE PRACTITIONER

## 2018-05-25 RX ORDER — TRIAMCINOLONE ACETONIDE 1 MG/G
CREAM TOPICAL 2 TIMES DAILY
Qty: 454 G | Refills: 1 | Status: ON HOLD | OUTPATIENT
Start: 2018-05-25 | End: 2019-03-27
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
## 2018-05-25 NOTE — PATIENT INSTRUCTIONS
Elevate legs as much as possible. Do not get the dressings wet and use cast covers for showering.  Should the dressing become wet, remove it, place a wet-to-dry dressing over the wound, cover with gauze and roll gauze and use ace wraps for compression and to secure bandages.  Notify home health as soon as possible to have a new dressing applied.    Apply medihoney gel daily to finger burn, cover with cotton gauze and secure with paper tape or coban.

## 2018-06-05 ENCOUNTER — TELEPHONE (OUTPATIENT)
Dept: INTERNAL MEDICINE | Facility: CLINIC | Age: 73
End: 2018-06-05

## 2018-06-05 NOTE — TELEPHONE ENCOUNTER
----- Message from Bhavana Acosta sent at 6/5/2018  1:22 PM CDT -----  Contact: [employer] [phone] ref #'s [id number]  [employer], a [employer] is supplying patient with wound care supplies. They faxed a detailed written order that needs to be signed and dated and sent back. Their fax number is on the form.

## 2018-06-12 ENCOUNTER — OFFICE VISIT (OUTPATIENT)
Dept: WOUND CARE | Facility: CLINIC | Age: 73
End: 2018-06-12
Payer: MEDICARE

## 2018-06-12 VITALS
HEART RATE: 84 BPM | TEMPERATURE: 97 F | SYSTOLIC BLOOD PRESSURE: 132 MMHG | DIASTOLIC BLOOD PRESSURE: 80 MMHG | HEIGHT: 67 IN

## 2018-06-12 DIAGNOSIS — B35.3 TINEA PEDIS OF BOTH FEET: Chronic | ICD-10-CM

## 2018-06-12 DIAGNOSIS — L97.502 SKIN ULCER OF TOE WITH FAT LAYER EXPOSED, UNSPECIFIED LATERALITY: Primary | ICD-10-CM

## 2018-06-12 DIAGNOSIS — R60.0 BILATERAL LEG EDEMA: ICD-10-CM

## 2018-06-12 DIAGNOSIS — I87.2 VENOUS STASIS DERMATITIS OF BOTH LOWER EXTREMITIES: Chronic | ICD-10-CM

## 2018-06-12 PROBLEM — T31.10 BURN ANY DEGREE INVOLVING 10-19 PERCENT OF BODY SURFACE: Status: RESOLVED | Noted: 2018-05-10 | Resolved: 2018-06-12

## 2018-06-12 PROBLEM — L97.911 ULCER OF RIGHT LOWER EXTREMITY, LIMITED TO BREAKDOWN OF SKIN: Status: RESOLVED | Noted: 2018-05-10 | Resolved: 2018-06-12

## 2018-06-12 PROBLEM — L97.521 SKIN ULCER OF LEFT FOOT, LIMITED TO BREAKDOWN OF SKIN: Status: RESOLVED | Noted: 2018-05-10 | Resolved: 2018-06-12

## 2018-06-12 PROBLEM — L97.921 ULCER OF LEFT LOWER EXTREMITY, LIMITED TO BREAKDOWN OF SKIN: Status: RESOLVED | Noted: 2017-04-27 | Resolved: 2018-06-12

## 2018-06-12 PROCEDURE — 3075F SYST BP GE 130 - 139MM HG: CPT | Mod: CPTII,S$GLB,, | Performed by: NURSE PRACTITIONER

## 2018-06-12 PROCEDURE — 99212 OFFICE O/P EST SF 10 MIN: CPT | Mod: S$GLB,,, | Performed by: NURSE PRACTITIONER

## 2018-06-12 PROCEDURE — 3079F DIAST BP 80-89 MM HG: CPT | Mod: CPTII,S$GLB,, | Performed by: NURSE PRACTITIONER

## 2018-06-12 PROCEDURE — 99999 PR PBB SHADOW E&M-EST. PATIENT-LVL V: CPT | Mod: PBBFAC,,, | Performed by: NURSE PRACTITIONER

## 2018-06-12 NOTE — PROGRESS NOTES
Subjective:       Patient ID: Sherin Ortiz is a 73 y.o. female.    Chief Complaint: Wound Check    Wound Check     Wound Check:   This patient is seen today for evaluation and management of recurrent ulcers to both lower legs and the left dorsal foot.  She also has a second degree burn to the left third finger incurred when she was baking.  Unna boots are in place bilaterally and she is using medihoney daily on the second degree burn of the left third finger.  The finger and leg wounds are healed.  The only wound that remains is the right second toe ulcer. Problems that interfere with wound healing include multiple co-morbidities, diabetes, edema, diabetic neuropathy and  morbid obesity. The patient is afebrile. The patient ambulates with great difficulty secondary to her body habitus.  She has no pain.  She denies increased swelling, redness or purulent drainage.   She is afebrile.   Review of Systems   Constitutional: Negative for chills, diaphoresis and fever.   HENT: Negative for hearing loss, postnasal drip, rhinorrhea, sinus pressure, sneezing, sore throat, tinnitus and trouble swallowing.    Eyes: Negative for visual disturbance.   Respiratory: Negative for apnea, cough, shortness of breath and wheezing.    Cardiovascular: Positive for leg swelling. Negative for chest pain and palpitations.   Gastrointestinal: Negative for constipation, diarrhea, nausea and vomiting.   Genitourinary: Negative for difficulty urinating, dysuria, frequency and hematuria.   Musculoskeletal: Negative for arthralgias, back pain and joint swelling.   Skin: Positive for wound.   Neurological: Negative for dizziness, weakness, light-headedness and headaches.   Hematological: Does not bruise/bleed easily.   Psychiatric/Behavioral: Negative for confusion, decreased concentration, dysphoric mood and sleep disturbance. The patient is not nervous/anxious.    All other systems reviewed and are negative.      Objective:      Physical  Exam   Constitutional: She is oriented to person, place, and time. No distress.   Morbidly obese   HENT:   Head: Normocephalic and atraumatic.   Neck: Normal range of motion. Neck supple.   Pulmonary/Chest: Effort normal.   Musculoskeletal: Normal range of motion. She exhibits edema. She exhibits no tenderness.        Hands:       Legs:       Feet:    Neurological: She is alert and oriented to person, place, and time.   Skin: Skin is warm and dry. Rash (dermatitis and tinea bilateral lower extremities) noted. She is not diaphoretic. No cyanosis or erythema. No pallor. Nails show no clubbing.   Psychiatric: She has a normal mood and affect. Her behavior is normal. Judgment and thought content normal.   Nursing note and vitals reviewed.    ..  Hemoglobin A1C   Date Value Ref Range Status   10/09/2017 9.9 (H) 4.0 - 5.6 % Final     Comment:     According to ADA guidelines, hemoglobin A1c <7.0% represents  optimal control in non-pregnant diabetic patients. Different  metrics may apply to specific patient populations.   Standards of Medical Care in Diabetes-2016.  For the purpose of screening for the presence of diabetes:  <5.7%     Consistent with the absence of diabetes  5.7-6.4%  Consistent with increasing risk for diabetes   (prediabetes)  >or=6.5%  Consistent with diabetes  Currently, no consensus exists for use of hemoglobin A1c  for diagnosis of diabetes for children.  This Hemoglobin A1c assay has significant interference with fetal   hemoglobin   (HbF). The results are invalid for patients with abnormal amounts of   HbF,   including those with known Hereditary Persistence   of Fetal Hemoglobin. Heterozygous hemoglobin variants (HbAS, HbAC,   HbAD, HbAE, HbA2) do not significantly interfere with this assay;   however, presence of multiple variants in a sample may impact the %   interference.     07/06/2017 10.0 (H) 4.0 - 5.6 % Final     Comment:     According to ADA guidelines, hemoglobin A1c <7.0%  represents  optimal control in non-pregnant diabetic patients. Different  metrics may apply to specific patient populations.   Standards of Medical Care in Diabetes-2016.  For the purpose of screening for the presence of diabetes:  <5.7%     Consistent with the absence of diabetes  5.7-6.4%  Consistent with increasing risk for diabetes   (prediabetes)  >or=6.5%  Consistent with diabetes  Currently, no consensus exists for use of hemoglobin A1c  for diagnosis of diabetes for children.  This Hemoglobin A1c assay has significant interference with fetal   hemoglobin   (HbF). The results are invalid for patients with abnormal amounts of   HbF,   including those with known Hereditary Persistence   of Fetal Hemoglobin. Heterozygous hemoglobin variants (HbAS, HbAC,   HbAD, HbAE, HbA2) do not significantly interfere with this assay;   however, presence of multiple variants in a sample may impact the %   interference.     03/08/2017 9.6 (H) 4.5 - 6.2 % Final     Comment:     According to ADA guidelines, hemoglobin A1C <7.0% represents  optimal control in non-pregnant diabetic patients.  Different  metrics may apply to specific populations.   Standards of Medical Care in Diabetes - 2016.  For the purpose of screening for the presence of diabetes:  <5.7%     Consistent with the absence of diabetes  5.7-6.4%  Consistent with increasing risk for diabetes   (prediabetes)  >or=6.5%  Consistent with diabetes  Currently no consensus exists for use of hemoglobin A1C  for diagnosis of diabetes for children.       ..  Lab Results   Component Value Date    ALBUMIN 3.0 (L) 01/21/2018       Assessment:       1. Skin ulcer of toe with fat layer exposed, unspecified laterality    2. Tinea pedis of both feet    3. Venous stasis dermatitis of both lower extremities    4. Bilateral leg edema        Plan:           Cleanse right second toe ulcer with wound cleanser.  Pad ankles with ABD pads.  Kerlix and coban bilateral lower legs.  Medihoney  gel to right second toe ulcer and cover with hydrofiber.  Cotton in between the toes, cover with gauze and secure with roll gauze.  Return to clinic in three weeks.  [facility] notified of orders via email.  We will ask them to see the patient twice weekly.    Second degree burn left third finger resolved      Right second toe ulcer      Left lateral leg ulcer healed      Right anterior leg ulcer healed

## 2018-06-15 ENCOUNTER — TELEPHONE (OUTPATIENT)
Dept: ADMINISTRATIVE | Facility: CLINIC | Age: 73
End: 2018-06-15

## 2018-06-26 ENCOUNTER — TELEPHONE (OUTPATIENT)
Dept: WOUND CARE | Facility: CLINIC | Age: 73
End: 2018-06-26

## 2018-06-26 NOTE — TELEPHONE ENCOUNTER
----- Message from Mike Reardon sent at 6/26/2018  9:30 AM CDT -----  Contact: Abner with [facility]  Abner said pt will be discharged next week because her wound is healed. Please call if needed at [phone]

## 2018-07-02 ENCOUNTER — PES CALL (OUTPATIENT)
Dept: ADMINISTRATIVE | Facility: CLINIC | Age: 73
End: 2018-07-02

## 2018-07-06 ENCOUNTER — OFFICE VISIT (OUTPATIENT)
Dept: WOUND CARE | Facility: CLINIC | Age: 73
End: 2018-07-06
Payer: MEDICARE

## 2018-07-06 VITALS
HEIGHT: 67 IN | HEART RATE: 63 BPM | SYSTOLIC BLOOD PRESSURE: 147 MMHG | DIASTOLIC BLOOD PRESSURE: 73 MMHG | TEMPERATURE: 97 F

## 2018-07-06 DIAGNOSIS — L97.512 SKIN ULCER OF TOE OF RIGHT FOOT WITH FAT LAYER EXPOSED: Primary | ICD-10-CM

## 2018-07-06 PROCEDURE — 99999 PR PBB SHADOW E&M-EST. PATIENT-LVL IV: CPT | Mod: PBBFAC,,, | Performed by: NURSE PRACTITIONER

## 2018-07-06 PROCEDURE — 3077F SYST BP >= 140 MM HG: CPT | Mod: CPTII,S$GLB,, | Performed by: NURSE PRACTITIONER

## 2018-07-06 PROCEDURE — 99212 OFFICE O/P EST SF 10 MIN: CPT | Mod: S$GLB,,, | Performed by: NURSE PRACTITIONER

## 2018-07-06 PROCEDURE — 3078F DIAST BP <80 MM HG: CPT | Mod: CPTII,S$GLB,, | Performed by: NURSE PRACTITIONER

## 2018-07-06 NOTE — PROGRESS NOTES
Subjective:       Patient ID: Sherin Ortiz is a 73 y.o. female.    Chief Complaint: Wound Check    Wound Check     Wound Check:   This patient is seen today for reevaluation of a right second toe ulcer. She is using medihoney gel and a hydrofiber dressing on the wound three times weekly.  The wound is healing as evidenced by wound contracture.  Problems that interfere with wound healing include multiple co-morbidities, diabetes, edema, diabetic neuropathy and  morbid obesity. The patient is afebrile. The patient ambulates with great difficulty secondary to her body habitus.  She has no pain.  She denies increased swelling, redness or purulent drainage.   She is afebrile.   Review of Systems   Constitutional: Negative for chills, diaphoresis and fever.   HENT: Negative for hearing loss, postnasal drip, rhinorrhea, sinus pressure, sneezing, sore throat, tinnitus and trouble swallowing.    Eyes: Negative for visual disturbance.   Respiratory: Negative for apnea, cough, shortness of breath and wheezing.    Cardiovascular: Positive for leg swelling. Negative for chest pain and palpitations.   Gastrointestinal: Negative for constipation, diarrhea, nausea and vomiting.   Genitourinary: Negative for difficulty urinating, dysuria, frequency and hematuria.   Musculoskeletal: Negative for arthralgias, back pain and joint swelling.   Skin: Positive for wound.   Neurological: Negative for dizziness, weakness, light-headedness and headaches.   Hematological: Does not bruise/bleed easily.   Psychiatric/Behavioral: Negative for confusion, decreased concentration, dysphoric mood and sleep disturbance. The patient is not nervous/anxious.    All other systems reviewed and are negative.      Objective:      Physical Exam   Constitutional: She is oriented to person, place, and time. No distress.   Morbidly obese   HENT:   Head: Normocephalic and atraumatic.   Neck: Normal range of motion. Neck supple.   Pulmonary/Chest: Effort  normal.   Musculoskeletal: Normal range of motion. She exhibits edema. She exhibits no tenderness.        Feet:    Neurological: She is alert and oriented to person, place, and time.   Skin: Skin is warm and dry. Rash (dermatitis and tinea bilateral lower extremities) noted. She is not diaphoretic. No cyanosis or erythema. No pallor. Nails show no clubbing.   Psychiatric: She has a normal mood and affect. Her behavior is normal. Judgment and thought content normal.   Nursing note and vitals reviewed.    ..  Hemoglobin A1C   Date Value Ref Range Status   10/09/2017 9.9 (H) 4.0 - 5.6 % Final     Comment:     According to ADA guidelines, hemoglobin A1c <7.0% represents  optimal control in non-pregnant diabetic patients. Different  metrics may apply to specific patient populations.   Standards of Medical Care in Diabetes-2016.  For the purpose of screening for the presence of diabetes:  <5.7%     Consistent with the absence of diabetes  5.7-6.4%  Consistent with increasing risk for diabetes   (prediabetes)  >or=6.5%  Consistent with diabetes  Currently, no consensus exists for use of hemoglobin A1c  for diagnosis of diabetes for children.  This Hemoglobin A1c assay has significant interference with fetal   hemoglobin   (HbF). The results are invalid for patients with abnormal amounts of   HbF,   including those with known Hereditary Persistence   of Fetal Hemoglobin. Heterozygous hemoglobin variants (HbAS, HbAC,   HbAD, HbAE, HbA2) do not significantly interfere with this assay;   however, presence of multiple variants in a sample may impact the %   interference.     07/06/2017 10.0 (H) 4.0 - 5.6 % Final     Comment:     According to ADA guidelines, hemoglobin A1c <7.0% represents  optimal control in non-pregnant diabetic patients. Different  metrics may apply to specific patient populations.   Standards of Medical Care in Diabetes-2016.  For the purpose of screening for the presence of diabetes:  <5.7%     Consistent  with the absence of diabetes  5.7-6.4%  Consistent with increasing risk for diabetes   (prediabetes)  >or=6.5%  Consistent with diabetes  Currently, no consensus exists for use of hemoglobin A1c  for diagnosis of diabetes for children.  This Hemoglobin A1c assay has significant interference with fetal   hemoglobin   (HbF). The results are invalid for patients with abnormal amounts of   HbF,   including those with known Hereditary Persistence   of Fetal Hemoglobin. Heterozygous hemoglobin variants (HbAS, HbAC,   HbAD, HbAE, HbA2) do not significantly interfere with this assay;   however, presence of multiple variants in a sample may impact the %   interference.     03/08/2017 9.6 (H) 4.5 - 6.2 % Final     Comment:     According to ADA guidelines, hemoglobin A1C <7.0% represents  optimal control in non-pregnant diabetic patients.  Different  metrics may apply to specific populations.   Standards of Medical Care in Diabetes - 2016.  For the purpose of screening for the presence of diabetes:  <5.7%     Consistent with the absence of diabetes  5.7-6.4%  Consistent with increasing risk for diabetes   (prediabetes)  >or=6.5%  Consistent with diabetes  Currently no consensus exists for use of hemoglobin A1C  for diagnosis of diabetes for children.       ..  Lab Results   Component Value Date    ALBUMIN 3.0 (L) 01/21/2018       Assessment:       1. Skin ulcer of toe of right foot with fat layer exposed        Plan:           Cleanse right second toe ulcer with wound cleanser.  Pad ankles with ABD pads.  Kerlix and coban bilateral lower legs.  Medihoney gel to right second toe ulcer and cover with hydrofiber.  Cotton in between the toes, cover with gauze and secure with roll gauze.  Return to clinic in three weeks.  [facility] notified of orders via email.  We will ask them to see the patient twice weekly.      Right second toe  ulcer

## 2018-07-19 DIAGNOSIS — E11.9 DIABETES MELLITUS WITHOUT COMPLICATION: ICD-10-CM

## 2018-08-13 ENCOUNTER — OFFICE VISIT (OUTPATIENT)
Dept: INTERNAL MEDICINE | Facility: CLINIC | Age: 73
End: 2018-08-13
Payer: MEDICARE

## 2018-08-13 ENCOUNTER — LAB VISIT (OUTPATIENT)
Dept: LAB | Facility: HOSPITAL | Age: 73
End: 2018-08-13
Attending: INTERNAL MEDICINE
Payer: MEDICARE

## 2018-08-13 VITALS
DIASTOLIC BLOOD PRESSURE: 78 MMHG | OXYGEN SATURATION: 93 % | HEART RATE: 73 BPM | SYSTOLIC BLOOD PRESSURE: 138 MMHG | HEIGHT: 67 IN | BODY MASS INDEX: 49.65 KG/M2

## 2018-08-13 DIAGNOSIS — E11.42 DIABETIC PERIPHERAL NEUROPATHY ASSOCIATED WITH TYPE 2 DIABETES MELLITUS: Chronic | ICD-10-CM

## 2018-08-13 DIAGNOSIS — R60.0 BILATERAL LEG EDEMA: ICD-10-CM

## 2018-08-13 DIAGNOSIS — I50.30 (HFPEF) HEART FAILURE WITH PRESERVED EJECTION FRACTION: Primary | ICD-10-CM

## 2018-08-13 DIAGNOSIS — L97.509 ULCER OF TOE, UNSPECIFIED LATERALITY, UNSPECIFIED ULCER STAGE: ICD-10-CM

## 2018-08-13 DIAGNOSIS — I70.0 AORTIC ATHEROSCLEROSIS: ICD-10-CM

## 2018-08-13 DIAGNOSIS — E78.5 HYPERLIPIDEMIA LDL GOAL <100: Chronic | ICD-10-CM

## 2018-08-13 DIAGNOSIS — I10 ESSENTIAL HYPERTENSION: Chronic | ICD-10-CM

## 2018-08-13 DIAGNOSIS — E21.3 HYPERPARATHYROIDISM: ICD-10-CM

## 2018-08-13 DIAGNOSIS — D63.8 ANEMIA OF CHRONIC DISEASE: ICD-10-CM

## 2018-08-13 DIAGNOSIS — I77.9 PAOD (PERIPHERAL ARTERIAL OCCLUSIVE DISEASE): ICD-10-CM

## 2018-08-13 PROBLEM — R31.9 URINARY TRACT INFECTION WITH HEMATURIA: Status: RESOLVED | Noted: 2018-01-06 | Resolved: 2018-08-13

## 2018-08-13 PROBLEM — N39.0 URINARY TRACT INFECTION WITH HEMATURIA: Status: RESOLVED | Noted: 2018-01-06 | Resolved: 2018-08-13

## 2018-08-13 LAB
ALBUMIN SERPL BCP-MCNC: 3.3 G/DL
ALP SERPL-CCNC: 122 U/L
ALT SERPL W/O P-5'-P-CCNC: 19 U/L
ANION GAP SERPL CALC-SCNC: 6 MMOL/L
AST SERPL-CCNC: 19 U/L
BASOPHILS # BLD AUTO: 0.01 K/UL
BASOPHILS NFR BLD: 0.2 %
BILIRUB SERPL-MCNC: 0.4 MG/DL
BUN SERPL-MCNC: 30 MG/DL
CALCIUM SERPL-MCNC: 10 MG/DL
CHLORIDE SERPL-SCNC: 103 MMOL/L
CHOLEST SERPL-MCNC: 166 MG/DL
CHOLEST/HDLC SERPL: 3 {RATIO}
CO2 SERPL-SCNC: 29 MMOL/L
CREAT SERPL-MCNC: 1.4 MG/DL
DIFFERENTIAL METHOD: ABNORMAL
EOSINOPHIL # BLD AUTO: 0.1 K/UL
EOSINOPHIL NFR BLD: 2.7 %
ERYTHROCYTE [DISTWIDTH] IN BLOOD BY AUTOMATED COUNT: 13.8 %
EST. GFR  (AFRICAN AMERICAN): 42 ML/MIN/1.73 M^2
EST. GFR  (NON AFRICAN AMERICAN): 37 ML/MIN/1.73 M^2
ESTIMATED AVG GLUCOSE: 258 MG/DL
GLUCOSE SERPL-MCNC: 221 MG/DL
HBA1C MFR BLD HPLC: 10.6 %
HCT VFR BLD AUTO: 35.8 %
HDLC SERPL-MCNC: 55 MG/DL
HDLC SERPL: 33.1 %
HGB BLD-MCNC: 11.8 G/DL
LDLC SERPL CALC-MCNC: 81.4 MG/DL
LYMPHOCYTES # BLD AUTO: 1.3 K/UL
LYMPHOCYTES NFR BLD: 26.4 %
MCH RBC QN AUTO: 29 PG
MCHC RBC AUTO-ENTMCNC: 33 G/DL
MCV RBC AUTO: 88 FL
MONOCYTES # BLD AUTO: 0.4 K/UL
MONOCYTES NFR BLD: 8.4 %
NEUTROPHILS # BLD AUTO: 3 K/UL
NEUTROPHILS NFR BLD: 62.3 %
NONHDLC SERPL-MCNC: 111 MG/DL
PLATELET # BLD AUTO: 180 K/UL
PMV BLD AUTO: 9.4 FL
POTASSIUM SERPL-SCNC: 4.5 MMOL/L
PROT SERPL-MCNC: 7.4 G/DL
RBC # BLD AUTO: 4.07 M/UL
SODIUM SERPL-SCNC: 138 MMOL/L
TRIGL SERPL-MCNC: 148 MG/DL
WBC # BLD AUTO: 4.77 K/UL

## 2018-08-13 PROCEDURE — 3075F SYST BP GE 130 - 139MM HG: CPT | Mod: CPTII,S$GLB,, | Performed by: INTERNAL MEDICINE

## 2018-08-13 PROCEDURE — 99499 UNLISTED E&M SERVICE: CPT | Mod: HCNC,S$GLB,, | Performed by: INTERNAL MEDICINE

## 2018-08-13 PROCEDURE — 3078F DIAST BP <80 MM HG: CPT | Mod: CPTII,S$GLB,, | Performed by: INTERNAL MEDICINE

## 2018-08-13 PROCEDURE — 36415 COLL VENOUS BLD VENIPUNCTURE: CPT

## 2018-08-13 PROCEDURE — 83036 HEMOGLOBIN GLYCOSYLATED A1C: CPT

## 2018-08-13 PROCEDURE — 85025 COMPLETE CBC W/AUTO DIFF WBC: CPT

## 2018-08-13 PROCEDURE — 3046F HEMOGLOBIN A1C LEVEL >9.0%: CPT | Mod: CPTII,S$GLB,, | Performed by: INTERNAL MEDICINE

## 2018-08-13 PROCEDURE — 99214 OFFICE O/P EST MOD 30 MIN: CPT | Mod: S$GLB,,, | Performed by: INTERNAL MEDICINE

## 2018-08-13 PROCEDURE — 80061 LIPID PANEL: CPT

## 2018-08-13 PROCEDURE — 80053 COMPREHEN METABOLIC PANEL: CPT

## 2018-08-13 PROCEDURE — 99999 PR PBB SHADOW E&M-EST. PATIENT-LVL III: CPT | Mod: PBBFAC,,, | Performed by: INTERNAL MEDICINE

## 2018-08-13 NOTE — PROGRESS NOTES
Subjective:      Patient ID: Sherin Ortiz is a 73 y.o. female.    Chief Complaint: Annual Exam    HPI:  HPI   [employer] form completed  Patient was discharged from wound care, she will call for any new breakdown,    Diabetes Management Status    Statin: Taking  ACE/ARB: Taking    Screening or Prevention Patient's value Goal Complete/Controlled?   HgA1C Testing and Control   Lab Results   Component Value Date    HGBA1C 10.6 (H) 08/13/2018      Annually/Less than 8% No   Lipid profile : 03/08/2017 Annually No   LDL control Lab Results   Component Value Date    LDLCALC 81.4 08/13/2018    Annually/Less than 100 mg/dl  No   Nephropathy screening Lab Results   Component Value Date    LABMICR 29.1 09/26/2011     Lab Results   Component Value Date    PROTEINUA 1+ (A) 01/21/2018    Annually Yes   Blood pressure BP Readings from Last 1 Encounters:   08/13/18 138/78    Less than 140/90 Yes   Dilated retinal exam : 10/09/2017 Annually Yes   Foot exam   : 08/14/2017 Annually Yes         Patient Active Problem List   Diagnosis    Bilateral leg edema    Tinea pedis    Hyperlipidemia LDL goal <100    Essential hypertension    PSC (posterior subcapsular cataract) - Both Eyes    Nuclear sclerosis - Both Eyes    Asteroid hyalosis - Left Eye    Stage 3 chronic kidney disease    Dystrophic nail    Weakness    Inability to walk    Morbid obesity with BMI of 40.0-44.9, adult    Diabetic peripheral neuropathy associated with type 2 diabetes mellitus    Anemia of chronic disease    Hypoalbuminemia    Wheelchair dependent    Ulcer of toe    Venous insufficiency of leg    Dermatitis, stasis    Uncontrolled type 2 diabetes mellitus with stage 3 chronic kidney disease, with long-term current use of insulin    Hyperparathyroidism    Aortic atherosclerosis    PAOD (peripheral arterial occlusive disease)    Recurrent UTI    Right shoulder strain, initial encounter    Hx of transient ischemic attack (TIA)     Alkaline phosphatase elevation    (HFpEF) heart failure with preserved ejection fraction     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    Benign essential hypertension 11/14/2012    Cataract     s/p phacoemulsification    Chronic kidney disease (CKD), stage III (moderate) 9/12/2013    Diabetic peripheral neuropathy associated with type 2 diabetes mellitus 11/14/2014    causing right hemiparesis    Gait disorder     Hyperlipidemia     Iritis - Both Eyes 6/10/2013    Kidney stone     Lymphedema     Morbid obesity with BMI of 40.0-44.9, adult 2/18/2015    Nephrolithiasis 4/20/2016    NS (nuclear sclerosis) 4/1/2013    Nuclear sclerosis - Both Eyes 4/29/2013    Preseptal cellulitis - Right Eye 4/29/2013    Proliferative diabetic retinopathy - Both Eyes 4/29/2013    Proliferative diabetic retinopathy, both eyes 4/1/2013    PSC (posterior subcapsular cataract) - Both Eyes 4/29/2013    S/P hernia repair 12/19/2012    TIA (transient ischemic attack) 11/18/2014    Tinea pedis 7/24/2012    Tinea pedis is present on both feet.     Type 2 diabetes mellitus with renal manifestations, controlled 12/12/2013    Type 2 diabetes, controlled, with moderate nonproliferative diabetic retinopathy without macular edema 9/17/2015    Ulcer of left lower extremity, limited to breakdown of skin 7/8/2015    Unspecified cerebral artery occlusion with cerebral infarction 11/16/2014    Unspecified venous (peripheral) insufficiency     Ureteral stone with hydronephrosis 1/27/2016    UTI (lower urinary tract infection)     Vaginal infection     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     Past Surgical History:   Procedure Laterality Date    APPENDECTOMY      CATARACT EXTRACTION W/  INTRAOCULAR LENS IMPLANT Left 5/21/2013    CATARACT EXTRACTION W/  INTRAOCULAR LENS IMPLANT Right 6/4/2013    CHOLECYSTECTOMY      COLONOSCOPY  12/22/2005    normal     "ESOPHAGOGASTRODUODENOSCOPY  12/21/2015    hiatal hernia, Schatzki ring    EYE SURGERY Bilateral 2008    laser surgery both eyes    NASAL SEPTUM SURGERY      SUBTOTAL COLECTOMY  12/13/2012    transverse colon, for incarcerated umbilical hernia, Dr. Kat Bower     Family History   Problem Relation Age of Onset    Diabetes Sister     Cataracts Sister     Heart disease Brother     Cataracts Brother     Leukemia Mother     Cancer Neg Hx     Amblyopia Neg Hx     Blindness Neg Hx     Glaucoma Neg Hx     Hypertension Neg Hx     Macular degeneration Neg Hx     Retinal detachment Neg Hx     Strabismus Neg Hx     Stroke Neg Hx     Thyroid disease Neg Hx     Kidney disease Neg Hx      Review of Systems   Constitutional: Negative for chills, fever and unexpected weight change.   HENT: Negative for trouble swallowing.    Respiratory: Negative for cough, shortness of breath and wheezing.    Cardiovascular: Negative for chest pain and palpitations.   Gastrointestinal: Negative for abdominal distention, abdominal pain, blood in stool and vomiting.   Musculoskeletal: Negative for back pain.     Objective:     Vitals:    08/13/18 1131 08/13/18 1215   BP:  138/78   Pulse: 73    SpO2: (!) 93%    Height: 5' 7" (1.702 m)    PainSc: 0-No pain      Body mass index is 49.65 kg/m².  Physical Exam   Constitutional: She appears well-developed and well-nourished.   Neck: No JVD present. No thyromegaly present.   Cardiovascular: Normal rate, normal heart sounds and intact distal pulses.   Pulmonary/Chest: Effort normal and breath sounds normal. No respiratory distress.     Assessment:     1. (HFpEF) heart failure with preserved ejection fraction    2. Anemia of chronic disease    3. Bilateral leg edema    4. Diabetic peripheral neuropathy associated with type 2 diabetes mellitus    5. Essential hypertension    6. Hyperlipidemia LDL goal <100    7. Hyperparathyroidism    8. Aortic atherosclerosis    9. PAOD (peripheral " "arterial occlusive disease)    10. Ulcer of toe, unspecified laterality, unspecified ulcer stage      Plan:   Sherin was seen today for annual exam.    Diagnoses and all orders for this visit:    (HFpEF) heart failure with preserved ejection fraction: no evidence of heart failure    Anemia of chronic disease: labs ordered    Bilateral leg edema: followed when needed in wound care    Diabetic peripheral neuropathy associated with type 2 diabetes mellitus  -     Hemoglobin A1c; Future    Essential hypertension: good control same med  -     CBC auto differential; Future  -     Comprehensive metabolic panel; Future    Hyperlipidemia LDL goal <100 monitor and obtain lab  -     Lipid panel; Future    Hyperparathyroidism: followed in Nephrology    Aortic atherosclerosis: not on statin     PAOD (peripheral arterial occlusive disease): continue to monitor for any ulcers    Ulcer of toe, unspecified laterality, unspecified ulcer stage: chronic, monitored      Follow-up in about 6 months (around 2/13/2019) for Follow up.     Medication List           Accurate as of 8/13/18  6:17 PM. If you have any questions, ask your nurse or doctor.               CHANGE how you take these medications    clopidogrel 75 mg tablet  Commonly known as:  PLAVIX  TAKE 1 TABLET ONE TIME DAILY  What changed:  See the new instructions.     diclofenac sodium 1 % Gel  Apply 2 g topically nightly as needed.  What changed:  reasons to take this        CONTINUE taking these medications    ACCU-CHEK RAMIRO PLUS TEST STRP Strp  Generic drug:  blood sugar diagnostic  TEST TWICE DAILY     ACCU-CHEK SOFTCLIX LANCETS Misc  Generic drug:  lancets     acetaminophen 500 mg/15 mL Liqd     aspirin 81 MG Chew     atorvastatin 40 MG tablet  Commonly known as:  LIPITOR  TAKE 1 TABLET EVERY EVENING     BD INSULIN SYRINGE ULTRA-FINE 1/2 mL 30 gauge x 1/2" Syrg  Generic drug:  insulin syringe-needle U-100  USE EVERY NIGHT     ergocalciferol 50,000 unit Cap  Commonly known as: "  ERGOCALCIFEROL  Take 1 capsule (50,000 Units total) by mouth every 7 days.     fluconazole 200 MG Tab  Commonly known as:  DIFLUCAN     glimepiride 1 MG tablet  Commonly known as:  AMARYL  Take 2 tablets (2 mg total) by mouth with lunch.     hydroCHLOROthiazide 25 MG tablet  Commonly known as:  HYDRODIURIL  Take 1 tablet (25 mg total) by mouth once daily.     LANTUS U-100 INSULIN 100 unit/mL injection  Generic drug:  insulin glargine  INJECT 7 TO 10 UNITS SUBCUTANEOUSLY IN THE EVENING DEPENDING ON SCALE (DISCARD EACH VIAL AFTER 28 DAYS)     lisinopril 10 MG tablet     triamcinolone acetonide 0.1% 0.1 % cream  Commonly known as:  KENALOG  Apply topically 2 (two) times daily.        STOP taking these medications    cefdinir 300 MG capsule  Commonly known as:  OMNICEF  Stopped by:  Ame Vasquez MD     cephALEXin 500 MG capsule  Commonly known as:  KEFLEX  Stopped by:  Ame Vasquez MD     ciprofloxacin HCl 500 MG tablet  Commonly known as:  CIPRO  Stopped by:  Ame Vasquez MD

## 2018-08-15 ENCOUNTER — PES CALL (OUTPATIENT)
Dept: ADMINISTRATIVE | Facility: CLINIC | Age: 73
End: 2018-08-15

## 2018-08-16 ENCOUNTER — PES CALL (OUTPATIENT)
Dept: ADMINISTRATIVE | Facility: CLINIC | Age: 73
End: 2018-08-16

## 2018-08-18 ENCOUNTER — TELEPHONE (OUTPATIENT)
Dept: INTERNAL MEDICINE | Facility: CLINIC | Age: 73
End: 2018-08-18

## 2018-08-18 NOTE — TELEPHONE ENCOUNTER
Please tell her that her A1C has increased to 10.6. Please encourage her to take her sugars and adjust her insulin to improve her numbers.

## 2018-08-20 ENCOUNTER — PES CALL (OUTPATIENT)
Dept: ADMINISTRATIVE | Facility: CLINIC | Age: 73
End: 2018-08-20

## 2018-10-02 ENCOUNTER — PES CALL (OUTPATIENT)
Dept: ADMINISTRATIVE | Facility: CLINIC | Age: 73
End: 2018-10-02

## 2018-10-04 DIAGNOSIS — E11.9 DIABETES MELLITUS WITHOUT COMPLICATION: ICD-10-CM

## 2018-10-05 ENCOUNTER — OUTPATIENT CASE MANAGEMENT (OUTPATIENT)
Dept: ADMINISTRATIVE | Facility: OTHER | Age: 73
End: 2018-10-05

## 2018-10-05 NOTE — PROGRESS NOTES
Please note the following patient has been assigned to Jamilah Jaimes RN in Outpatient Case Management for Diabetes Disease Management with a hbA1C greater than 10.0.    Please contact [facility] at Ext. [phone] with any questions.    Thank you,    Wendie Leung    Outpatient Case Management

## 2018-10-12 ENCOUNTER — OUTPATIENT CASE MANAGEMENT (OUTPATIENT)
Dept: ADMINISTRATIVE | Facility: OTHER | Age: 73
End: 2018-10-12

## 2018-10-12 NOTE — PROGRESS NOTES
Summary:Call placed to [phone] with message left containing contact information.  Interventions: n/a  Plan: n/a

## 2018-10-16 ENCOUNTER — OUTPATIENT CASE MANAGEMENT (OUTPATIENT)
Dept: ADMINISTRATIVE | Facility: OTHER | Age: 73
End: 2018-10-16

## 2018-10-16 NOTE — PROGRESS NOTES
Summary: Call placed to [phone] with a message left containing contact information.    Interventions: Will mail an attempt to contact letter.    Plan: n/a    Todays OPCM Self-Management Care Plan was developed with the patients/caregivers input and was based on identified barriers from todays assessment.  Goals were written today with the patient/caregiver and the patient has agreed to work towards these goals to improve his/her overall well-being. Patient verbalized understanding of the care plan, goals, and all of today's instructions. Encouraged patient/caregiver to communicate with his/her physician and health care team about health conditions and the treatment plan.  Provided my contact information today and encouraged patient/caregiver to call me with any questions as needed.

## 2018-10-16 NOTE — LETTER
October 16, 2018    Sherin Ortiz  [address] [place] [address]             [facility] [address] [place] [address] Dear Ms. Sherin Ortiz,      I work with [facility]'s Outpatient Case Management department. We received a referral to call you to discuss your medical history. I attempted to reach you by telephone, but I was unsuccessful. Please call our department so that we can go over some questions with you regarding your health. I will make another attempt to contact you by phone before closing your case with case management.    The Outpatient Case Management department can be reached at [phone] from 8:00am to 4:30pm on Monday thru Friday. [facility] also has a program where a nurse is available 24/7 to answer questions or provider medical advice. Their number is [phone].     Thanks,      Jamilah Jaimes, RN  Outpatient Case Management

## 2018-10-18 ENCOUNTER — OUTPATIENT CASE MANAGEMENT (OUTPATIENT)
Dept: ADMINISTRATIVE | Facility: OTHER | Age: 73
End: 2018-10-18

## 2018-10-18 NOTE — PROGRESS NOTES
Summary: Call placed to [phone] with message left containing contact information.  Interventions:3rd attempt without success. Will close the case at this time.  Plan: n/a

## 2018-11-21 ENCOUNTER — HOSPITAL ENCOUNTER (OUTPATIENT)
Dept: WOUND CARE | Facility: HOSPITAL | Age: 73
Discharge: HOME | End: 2018-11-21
Attending: SURGERY
Payer: MEDICARE

## 2018-11-21 VITALS
WEIGHT: 293 LBS | BODY MASS INDEX: 45.99 KG/M2 | HEIGHT: 67 IN | DIASTOLIC BLOOD PRESSURE: 85 MMHG | TEMPERATURE: 98 F | SYSTOLIC BLOOD PRESSURE: 139 MMHG | HEART RATE: 83 BPM

## 2018-11-21 DIAGNOSIS — I10 ESSENTIAL HYPERTENSION: Chronic | ICD-10-CM

## 2018-11-21 DIAGNOSIS — R53.1 WEAKNESS: ICD-10-CM

## 2018-11-21 DIAGNOSIS — L97.512 SKIN ULCER OF TOE OF RIGHT FOOT WITH FAT LAYER EXPOSED: Primary | ICD-10-CM

## 2018-11-21 DIAGNOSIS — L97.509 ULCER OF TOE, UNSPECIFIED LATERALITY, UNSPECIFIED ULCER STAGE: ICD-10-CM

## 2018-11-21 DIAGNOSIS — N18.30 STAGE 3 CHRONIC KIDNEY DISEASE: Chronic | ICD-10-CM

## 2018-11-21 DIAGNOSIS — Z99.3 WHEELCHAIR DEPENDENT: Chronic | ICD-10-CM

## 2018-11-21 DIAGNOSIS — B35.3 TINEA PEDIS OF BOTH FEET: ICD-10-CM

## 2018-11-21 DIAGNOSIS — I87.2 VENOUS STASIS DERMATITIS OF BOTH LOWER EXTREMITIES: ICD-10-CM

## 2018-11-21 DIAGNOSIS — I87.2 VENOUS INSUFFICIENCY OF BOTH LOWER EXTREMITIES: Chronic | ICD-10-CM

## 2018-11-21 DIAGNOSIS — E66.01 MORBID OBESITY WITH BMI OF 40.0-44.9, ADULT: ICD-10-CM

## 2018-11-21 DIAGNOSIS — R60.0 BILATERAL LEG EDEMA: ICD-10-CM

## 2018-11-21 DIAGNOSIS — R26.2 INABILITY TO WALK: ICD-10-CM

## 2018-11-21 PROCEDURE — 87186 SC STD MICRODIL/AGAR DIL: CPT | Mod: HCNC

## 2018-11-21 PROCEDURE — 87070 CULTURE OTHR SPECIMN AEROBIC: CPT | Mod: HCNC

## 2018-11-21 PROCEDURE — 87077 CULTURE AEROBIC IDENTIFY: CPT | Mod: HCNC

## 2018-11-21 PROCEDURE — 99204 OFFICE O/P NEW MOD 45 MIN: CPT | Mod: HCNC

## 2018-11-21 PROCEDURE — 29581 APPL MULTLAYER CMPRN SYS LEG: CPT | Mod: HCNC

## 2018-11-21 RX ORDER — GENTAMICIN SULFATE 1 MG/G
OINTMENT TOPICAL DAILY
Qty: 30 G | Refills: 1 | Status: ON HOLD | OUTPATIENT
Start: 2018-11-21 | End: 2019-03-27

## 2018-11-21 RX ORDER — CLINDAMYCIN HYDROCHLORIDE 300 MG/1
300 CAPSULE ORAL 3 TIMES DAILY
Qty: 30 CAPSULE | Refills: 1 | Status: SHIPPED | OUTPATIENT
Start: 2018-11-21 | End: 2018-12-26 | Stop reason: ALTCHOICE

## 2018-11-21 RX ORDER — BUMETANIDE 1 MG/1
1 TABLET ORAL DAILY
Qty: 30 TABLET | Refills: 11 | Status: SHIPPED | OUTPATIENT
Start: 2018-11-21 | End: 2018-12-26

## 2018-11-21 NOTE — PROGRESS NOTES
Subjective:       Patient ID: Sherin Ortiz is a 73 y.o. female.    Chief Complaint: Diabetic Foot Ulcer and Venous Ulcer    Patient admitted to the [facility] with non healing wound to bilateral legs and feet.  Hx of DM, ESRD, PVD.  Rx sent to pharmacy for Gentamycin, Bumex,and Clindamycin.  Patient resides at home and cares for her sister.  She is non ambulatory and used and motorized scooter for mobility.  Home Health ordered.        Review of Systems   Constitutional: Negative.    HENT: Negative.    Eyes: Negative.    Respiratory: Negative.    Cardiovascular: Positive for leg swelling.   Gastrointestinal: Negative.    Genitourinary: Negative.    Musculoskeletal: Negative.    Skin: Positive for color change, rash and wound.   Neurological: Negative.    Psychiatric/Behavioral: Negative.        Objective:      Physical Exam   Constitutional: She is oriented to person, place, and time. She appears well-developed and well-nourished.   HENT:   Head: Normocephalic.   Eyes: Conjunctivae and EOM are normal. Pupils are equal, round, and reactive to light.   Neck: Normal range of motion. Neck supple.   Cardiovascular: Normal rate, regular rhythm, normal heart sounds and intact distal pulses.   Pulmonary/Chest: Effort normal and breath sounds normal.   Abdominal: Soft. Bowel sounds are normal.   Musculoskeletal: Normal range of motion.   Neurological: She is alert and oriented to person, place, and time. She has normal reflexes.   Skin: Skin is warm and dry.       Assessment:       1. Skin ulcer of toe of right foot with fat layer exposed    2. Tinea pedis of both feet    3. Venous stasis dermatitis of both lower extremities    4. Bilateral leg edema    5. Essential hypertension    6. Morbid obesity with BMI of 40.0-44.9, adult    7. Venous insufficiency of both lower extremities    8. Ulcer of toe, unspecified laterality, unspecified ulcer stage    9. Wheelchair dependent    10. Inability to walk    11. Weakness    12. Stage  3 chronic kidney disease           Wound 11/21/18 1045 Venous Ulcer anterior Leg #1 (Active)   11/21/18 1045    Pre-existing: Yes   Primary Wound Type: Venous ulcer   Side: Right   Orientation: anterior   Location: Leg   Wound/PI Number (optional): #1   Ankle-Brachial Index:    Pulses:    Removal Indication and Assessment:    Wound Outcome:    (Retired) Wound Type:    (Retired) Wound Length (cm):    (Retired) Wound Width (cm):    (Retired) Depth (cm):    Wound Description (Comments):    Removal Indications:    Wound Image   11/21/2018 11:00 AM   Dressing Appearance Intact;Moist drainage 11/21/2018 11:00 AM   Drainage Amount Moderate 11/21/2018 11:00 AM   Drainage Characteristics/Odor Serous 11/21/2018 11:00 AM   Appearance Red;Pink;Moist 11/21/2018 11:00 AM   Tissue loss description Partial thickness 11/21/2018 11:00 AM   Red (%), Wound Tissue Color 100 % 11/21/2018 11:00 AM   Periwound Area Edematous;Warm;Moist;Denuded 11/21/2018 11:00 AM   Wound Edges Irregular 11/21/2018 11:00 AM   Wound Length (cm) 5.8 cm 11/21/2018 11:00 AM   Wound Width (cm) 1.7 cm 11/21/2018 11:00 AM   Wound Depth (cm) 0.2 cm 11/21/2018 11:00 AM   Wound Volume (cm^3) 1.97 cm^3 11/21/2018 11:00 AM   Wound Surface Area (cm^2) 9.86 cm^2 11/21/2018 11:00 AM   Care Cleansed with:;Wound cleanser 11/21/2018 11:00 AM   Compression Two layer compression 11/21/2018 11:00 AM            Wound 11/21/18 1056 Venous Ulcer medial Leg #2 (Active)   11/21/18 1056    Pre-existing: Yes   Primary Wound Type: Venous ulcer   Side: Left   Orientation: medial   Location: Leg   Wound/PI Number (optional): #2   Ankle-Brachial Index:    Pulses:    Removal Indication and Assessment:    Wound Outcome:    (Retired) Wound Type:    (Retired) Wound Length (cm):    (Retired) Wound Width (cm):    (Retired) Depth (cm):    Wound Description (Comments):    Removal Indications:    Wound Image   11/21/2018 11:00 AM   Dressing Appearance Intact;Moist drainage 11/21/2018 11:00 AM    Drainage Amount Moderate 11/21/2018 11:00 AM   Drainage Characteristics/Odor Serous 11/21/2018 11:00 AM   Appearance Red;Pink;Moist 11/21/2018 11:00 AM   Tissue loss description Partial thickness 11/21/2018 11:00 AM   Red (%), Wound Tissue Color 100 % 11/21/2018 11:00 AM   Wound Edges Irregular 11/21/2018 11:00 AM   Wound Length (cm) 5 cm 11/21/2018 11:00 AM   Wound Width (cm) 8 cm 11/21/2018 11:00 AM   Wound Depth (cm) 0.1 cm 11/21/2018 11:00 AM   Wound Volume (cm^3) 4 cm^3 11/21/2018 11:00 AM   Wound Surface Area (cm^2) 40 cm^2 11/21/2018 11:00 AM   Care Cleansed with:;Sterile normal saline 11/21/2018 11:00 AM   Compression Two layer compression 11/21/2018 11:00 AM            Wound 11/21/18 1058 Diabetic Ulcer Toe, second #3 (Active)   11/21/18 1058    Pre-existing: Yes   Primary Wound Type: Diabetic ulc   Side: Right   Orientation:    Location: Toe, second   Wound/PI Number (optional): #3   Ankle-Brachial Index:    Pulses:    Removal Indication and Assessment:    Wound Outcome:    (Retired) Wound Type:    (Retired) Wound Length (cm):    (Retired) Wound Width (cm):    (Retired) Depth (cm):    Wound Description (Comments):    Removal Indications:    Wound Image   11/21/2018 11:00 AM   Dressing Appearance Dry;Intact;Clean 11/21/2018 11:00 AM   Drainage Amount Small 11/21/2018 11:00 AM   Drainage Characteristics/Odor Yellow 11/21/2018 11:00 AM   Appearance Yellow;Slough;Fibrin;Moist;Pink 11/21/2018 11:00 AM   Tissue loss description Partial thickness 11/21/2018 11:00 AM   Red (%), Wound Tissue Color 25 % 11/21/2018 11:00 AM   Yellow (%), Wound Tissue Color 75 % 11/21/2018 11:00 AM   Periwound Area Edematous;Hemosiderin Staining;Dry;Excoriated 11/21/2018 11:00 AM   Wound Edges Irregular 11/21/2018 11:00 AM   Wound Length (cm) 1.1 cm 11/21/2018 11:00 AM   Wound Width (cm) 0.9 cm 11/21/2018 11:00 AM   Wound Depth (cm) 0.2 cm 11/21/2018 11:00 AM   Wound Volume (cm^3) 0.2 cm^3 11/21/2018 11:00 AM   Wound Surface Area  (cm^2) 0.99 cm^2 11/21/2018 11:00 AM   Care Cleansed with:;Sterile normal saline 11/21/2018 11:00 AM       [REMOVED]      Wound 11/21/18 1105 Diabetic Ulcer Toe, second #4 (Removed)   11/21/18 1105    Pre-existing:    Primary Wound Type: Diabetic ulc   Side: Left   Orientation:    Location: Toe, second   Wound/PI Number (optional): #4   Ankle-Brachial Index:    Pulses:    Removal Indication and Assessment: other (see comments)   Wound Outcome: Other   (Retired) Wound Type:    (Retired) Wound Length (cm):    (Retired) Wound Width (cm):    (Retired) Depth (cm):    Wound Description (Comments):    Removal Indications:    Removed 11/21/18 1106   Wound Image   11/21/2018 11:00 AM   Appearance Epithelialization 11/21/2018 11:00 AM       Wound #1: Right anterior lower leg venous ulcer; Wound #2: Left anterior medial lower leg venous ulcer    Cleanse wound with: Normal saline; gentian violet between toes, aquacel ag rope between all toes  Primary dressing:gentamycin ointment  Secondary dressing:Xeroform, large abd pad x 1, secure with cast padding, coflex with calamine toe to knee, cast padding x 2, darco    Wound #3: Right 2nd toe diabetic ulcer    Cleanse wound with:Normal saline  Periwound care:Gentian violet between all toes and any wet irritated areas  Primary dressing:aquacel ag rope to wound and between all toes  Secondary dressing:coflex with calamine toe to knee, cast padding x 2, darco    Plan:              Follow up in 2 weeks with Dr. Díaz 12/5/18.    It was recommended patient follow up in 1 week; patient stated she could not due to transportation

## 2018-11-25 LAB
BACTERIA SPEC AEROBE CULT: NORMAL

## 2018-12-05 ENCOUNTER — HOSPITAL ENCOUNTER (OUTPATIENT)
Dept: WOUND CARE | Facility: HOSPITAL | Age: 73
Discharge: HOME | End: 2018-12-05
Attending: SURGERY
Payer: MEDICARE

## 2018-12-05 VITALS
SYSTOLIC BLOOD PRESSURE: 181 MMHG | DIASTOLIC BLOOD PRESSURE: 78 MMHG | HEART RATE: 80 BPM | TEMPERATURE: 98 F | RESPIRATION RATE: 20 BRPM

## 2018-12-05 DIAGNOSIS — I10 ESSENTIAL HYPERTENSION: Chronic | ICD-10-CM

## 2018-12-05 DIAGNOSIS — I87.2 VENOUS STASIS DERMATITIS OF BOTH LOWER EXTREMITIES: Primary | ICD-10-CM

## 2018-12-05 DIAGNOSIS — N18.30 STAGE 3 CHRONIC KIDNEY DISEASE: Chronic | ICD-10-CM

## 2018-12-05 DIAGNOSIS — R60.0 BILATERAL LEG EDEMA: ICD-10-CM

## 2018-12-05 DIAGNOSIS — I50.30 (HFPEF) HEART FAILURE WITH PRESERVED EJECTION FRACTION: ICD-10-CM

## 2018-12-05 DIAGNOSIS — I87.2 VENOUS INSUFFICIENCY OF BOTH LOWER EXTREMITIES: ICD-10-CM

## 2018-12-05 DIAGNOSIS — E66.01 MORBID OBESITY WITH BMI OF 40.0-44.9, ADULT: ICD-10-CM

## 2018-12-05 DIAGNOSIS — L97.509 ULCER OF TOE, UNSPECIFIED LATERALITY, UNSPECIFIED ULCER STAGE: ICD-10-CM

## 2018-12-05 PROCEDURE — 29581 APPL MULTLAYER CMPRN SYS LEG: CPT | Mod: HCNC | Performed by: NURSE PRACTITIONER

## 2018-12-05 NOTE — PROGRESS NOTES
[facility]                Progress Note    Subjective:       Patient ID: Sherin Ortiz is a 73 y.o. female.    Chief Complaint: Wound Care    Patient admitted to the [facility] with non healing wound to bilateral legs and feet.  Hx of DM, ESRD, PVD.  Rx sent to pharmacy for Gentamycin, Bumex,and Clindamycin.  Patient resides at home and cares for her sister.  She is non ambulatory and used and motorized scooter for mobility.  Home Health ordered.    12/5/18: F/U in clinic with Dr. Díaz. BLE edematous left greater than right and red. Dr. Díaz to order ultrasound of LLE. Patient to increase her diuretic and restart Abx. Dr. Díaz to send RX to pharmacy.        Review of Systems   Constitutional: Negative.    HENT: Negative.    Eyes: Negative.    Respiratory: Negative.    Cardiovascular: Negative.    Gastrointestinal: Negative.    Genitourinary: Negative.    Musculoskeletal: Negative.    Skin: Negative.    Neurological: Negative.    Psychiatric/Behavioral: Negative.          Objective:        Physical Exam   Constitutional: She is oriented to person, place, and time. She appears well-developed and well-nourished.   HENT:   Head: Normocephalic.   Eyes: Conjunctivae and EOM are normal. Pupils are equal, round, and reactive to light.   Neck: Normal range of motion. Neck supple.   Cardiovascular: Normal rate, regular rhythm, normal heart sounds and intact distal pulses.   Pulmonary/Chest: Effort normal and breath sounds normal.   Abdominal: Soft. Bowel sounds are normal.   Musculoskeletal: Normal range of motion.   Neurological: She is alert and oriented to person, place, and time. She has normal reflexes.   Skin: Skin is warm and dry.       Vitals:    12/05/18 1540   BP: (!) 181/78   Pulse: 80   Resp: 20   Temp: 97.6 °F (36.4 °C)       Assessment:           ICD-10-CM ICD-9-CM   1. Venous stasis dermatitis of both lower extremities I87.2 454.1   2. Bilateral leg  edema R60.0 782.3   3. Venous insufficiency of both lower extremities I87.2 459.81   4. Ulcer of toe, unspecified laterality, unspecified ulcer stage L97.509 707.15            Wound 11/21/18 1045 Venous Ulcer anterior Leg #1 (Active)   11/21/18 1045    Pre-existing: Yes   Primary Wound Type: Venous ulcer   Side: Right   Orientation: anterior   Location: Leg   Wound/PI Number (optional): #1   Ankle-Brachial Index:    Pulses:    Removal Indication and Assessment:    Wound Outcome:    (Retired) Wound Type:    (Retired) Wound Length (cm):    (Retired) Wound Width (cm):    (Retired) Depth (cm):    Wound Description (Comments):    Removal Indications:    Wound Image   12/5/2018  3:00 PM   Dressing Appearance Dry;Intact 12/5/2018  3:00 PM   Drainage Amount Scant 12/5/2018  3:00 PM   Drainage Characteristics/Odor Serosanguineous 12/5/2018  3:00 PM   Appearance Red;Yellow;Moist 12/5/2018  3:00 PM   Tissue loss description Partial thickness 12/5/2018  3:00 PM   Red (%), Wound Tissue Color 50 % 12/5/2018  3:00 PM   Yellow (%), Wound Tissue Color 50 % 12/5/2018  3:00 PM   Periwound Area Edematous;Excoriated;Satellite lesion 12/5/2018  3:00 PM   Wound Edges Defined 12/5/2018  3:00 PM   Wound Length (cm) 3 cm 12/5/2018  3:00 PM   Wound Width (cm) 1.5 cm 12/5/2018  3:00 PM   Wound Depth (cm) 0.1 cm 12/5/2018  3:00 PM   Wound Volume (cm^3) 0.45 cm^3 12/5/2018  3:00 PM   Wound Surface Area (cm^2) 4.5 cm^2 12/5/2018  3:00 PM   Care Cleansed with:;Sterile normal saline 12/5/2018  3:00 PM   Dressing Changed;Compression wrap;Foam;Abd pad;Cast padding 12/5/2018  3:00 PM   Periwound Care Skin barrier film applied 12/5/2018  3:00 PM   Compression Two layer compression 12/5/2018  3:00 PM   Off Loading Football dressing 12/5/2018  3:00 PM   Dressing Change Due 12/07/18 12/5/2018  3:00 PM            Wound 11/21/18 1056 Venous Ulcer medial Leg #2 (Active)   11/21/18 1056    Pre-existing: Yes   Primary Wound Type: Venous ulcer   Side: Left    Orientation: medial   Location: Leg   Wound/PI Number (optional): #2   Ankle-Brachial Index:    Pulses:    Removal Indication and Assessment:    Wound Outcome:    (Retired) Wound Type:    (Retired) Wound Length (cm):    (Retired) Wound Width (cm):    (Retired) Depth (cm):    Wound Description (Comments):    Removal Indications:    Wound Image   12/5/2018  3:00 PM   Dressing Appearance Dry;Intact 12/5/2018  3:00 PM   Drainage Amount Moderate 12/5/2018  3:00 PM   Drainage Characteristics/Odor Serosanguineous 12/5/2018  3:00 PM   Appearance Red;Yellow;Slough;Moist 12/5/2018  3:00 PM   Tissue loss description Partial thickness 12/5/2018  3:00 PM   Red (%), Wound Tissue Color 50 % 12/5/2018  3:00 PM   Yellow (%), Wound Tissue Color 50 % 12/5/2018  3:00 PM   Periwound Area Edematous;Excoriated;Satellite lesion 12/5/2018  3:00 PM   Wound Edges Irregular 12/5/2018  3:00 PM   Wound Length (cm) 3.5 cm 12/5/2018  3:00 PM   Wound Width (cm) 7.6 cm 12/5/2018  3:00 PM   Wound Depth (cm) 0.1 cm 12/5/2018  3:00 PM   Wound Volume (cm^3) 2.66 cm^3 12/5/2018  3:00 PM   Wound Surface Area (cm^2) 26.6 cm^2 12/5/2018  3:00 PM   Care Cleansed with:;Sterile normal saline 12/5/2018  3:00 PM   Dressing Changed;Abd pad;Cast padding;Compression wrap 12/5/2018  3:00 PM   Periwound Care Skin barrier film applied 12/5/2018  3:00 PM   Compression Two layer compression 12/5/2018  3:00 PM   Off Loading Football dressing 12/5/2018  3:00 PM   Dressing Change Due 12/26/18 12/5/2018  3:00 PM            Wound 11/21/18 1058 Diabetic Ulcer Toe, second #3 (Active)   11/21/18 1058    Pre-existing: Yes   Primary Wound Type: Diabetic ulc   Side: Right   Orientation:    Location: Toe, second   Wound/PI Number (optional): #3   Ankle-Brachial Index:    Pulses:    Removal Indication and Assessment:    Wound Outcome:    (Retired) Wound Type:    (Retired) Wound Length (cm):    (Retired) Wound Width (cm):    (Retired) Depth (cm):    Wound Description (Comments):     Removal Indications:    Wound Image   12/5/2018  3:00 PM   Dressing Appearance Dry;Intact 12/5/2018  3:00 PM   Drainage Amount None 12/5/2018  3:00 PM   Appearance Red;Yellow 12/5/2018  3:00 PM   Tissue loss description Partial thickness 12/5/2018  3:00 PM   Red (%), Wound Tissue Color 50 % 12/5/2018  3:00 PM   Yellow (%), Wound Tissue Color 50 % 12/5/2018  3:00 PM   Periwound Area Dry;Edematous 12/5/2018  3:00 PM   Wound Edges Defined 12/5/2018  3:00 PM   Wound Length (cm) 0.9 cm 12/5/2018  3:00 PM   Wound Width (cm) 0.7 cm 12/5/2018  3:00 PM   Wound Depth (cm) 0.2 cm 12/5/2018  3:00 PM   Wound Volume (cm^3) 0.13 cm^3 12/5/2018  3:00 PM   Wound Surface Area (cm^2) 0.63 cm^2 12/5/2018  3:00 PM   Care Cleansed with:;Sterile normal saline 12/5/2018  3:00 PM   Dressing Changed;Methylene blue/gentian violet;Silver 12/5/2018  3:00 PM   Periwound Care Dry periwound area maintained 12/5/2018  3:00 PM   Compression Two layer compression 12/5/2018  3:00 PM   Off Loading Football dressing 12/5/2018  3:00 PM   Dressing Change Due 12/26/18 12/5/2018  3:00 PM      Cleansed all 3 wounds with normal saline. Applied gentian violet between all toes and aquacel Ag rope between all toes. Gentamycin ointment applied to wounds, covered with xeroform, large abd pad x 1(to leg ulcers only), secure with cast padding, coflex with calamine toe to knee, cast padding x 2 to create football dressing to bilateral feet, coban, stocking, blue bootie and darco shoe.        Plan:                  Follow-up in about 3 weeks (around 12/26/2018) for Dr. Díaz.

## 2018-12-21 ENCOUNTER — TELEPHONE (OUTPATIENT)
Dept: WOUND CARE | Facility: HOSPITAL | Age: 73
End: 2018-12-21

## 2018-12-21 NOTE — TELEPHONE ENCOUNTER
HH nurse called to report pateint had a new wound to L 2nd toe and 2 blisters to RLE. Advised HH nurse that is was OK to use same current treatment as other wounds.

## 2018-12-26 ENCOUNTER — HOSPITAL ENCOUNTER (OUTPATIENT)
Dept: WOUND CARE | Facility: HOSPITAL | Age: 73
Discharge: HOME | End: 2018-12-26
Attending: SURGERY
Payer: MEDICARE

## 2018-12-26 VITALS
BODY MASS INDEX: 45.99 KG/M2 | SYSTOLIC BLOOD PRESSURE: 141 MMHG | DIASTOLIC BLOOD PRESSURE: 66 MMHG | HEART RATE: 82 BPM | TEMPERATURE: 98 F | WEIGHT: 293 LBS | HEIGHT: 67 IN

## 2018-12-26 DIAGNOSIS — Z99.3 WHEELCHAIR DEPENDENT: Chronic | ICD-10-CM

## 2018-12-26 DIAGNOSIS — R60.0 BILATERAL LEG EDEMA: ICD-10-CM

## 2018-12-26 DIAGNOSIS — L97.509 ULCER OF TOE, UNSPECIFIED LATERALITY, UNSPECIFIED ULCER STAGE: ICD-10-CM

## 2018-12-26 DIAGNOSIS — I87.2 VENOUS INSUFFICIENCY OF BOTH LOWER EXTREMITIES: ICD-10-CM

## 2018-12-26 DIAGNOSIS — I87.2 VENOUS STASIS DERMATITIS OF BOTH LOWER EXTREMITIES: Primary | ICD-10-CM

## 2018-12-26 PROCEDURE — 29581 APPL MULTLAYER CMPRN SYS LEG: CPT | Mod: HCNC,50

## 2018-12-26 RX ORDER — BUMETANIDE 1 MG/1
2 TABLET ORAL DAILY
Qty: 60 TABLET | Refills: 11 | Status: SHIPPED | OUTPATIENT
Start: 2018-12-26 | End: 2019-05-09

## 2018-12-26 NOTE — PROGRESS NOTES
[facility]                Progress Note    Subjective:       Patient ID: Sherin Ortiz is a 73 y.o. female.    Chief Complaint: Venous Ulcer    Chief Complaint: Wound Care     Patient admitted to the [facility] with non healing wound to bilateral legs and feet.  Hx of DM, ESRD, PVD.  Rx sent to pharmacy for Gentamycin, Bumex,and Clindamycin.  Patient resides at home and cares for her sister.  She is non ambulatory and used and motorized scooter for mobility.  Home Health ordered.    12/5/18: F/U in clinic with Dr. Díaz. BLE edematous left greater than right and red. Dr. Díaz to order ultrasound of LLE. Patient to increase her diuretic and restart Abx. Dr. Díaz to send RX to pharmacy.  12/26/18:  Right 2nd toe web space wound macerated.  Home health has been applying gentimicin to wound with no gentian violet.  Legs remain edematous.  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  Dr. Díaz increased Bumex from 1mg to 2mg daily.  Patient was instructed on medication dose change and importance of elevated her legs to reduce the edema.  Verbalized understanding.            Review of Systems      Objective:        Physical Exam    Vitals:    12/26/18 1457   BP: (!) 141/66   Pulse: 82   Temp: 97.6 °F (36.4 °C)       Assessment:           ICD-10-CM ICD-9-CM   1. Venous stasis dermatitis of both lower extremities I87.2 454.1   2. Bilateral leg edema R60.0 782.3   3. Venous insufficiency of both lower extremities I87.2 459.81   4. Ulcer of toe, unspecified laterality, unspecified ulcer stage L97.509 707.15            Wound 11/21/18 1045 Venous Ulcer anterior Leg #1 (Active)   11/21/18 1045    Pre-existing: Yes   Primary Wound Type: Venous ulcer   Side: Right   Orientation: anterior   Location: Leg   Wound/PI Number (optional): #1   Ankle-Brachial Index:    Pulses:    Removal Indication and  Assessment:    Wound Outcome:    (Retired) Wound Type:    (Retired) Wound Length (cm):    (Retired) Wound Width (cm):    (Retired) Depth (cm):    Wound Description (Comments):    Removal Indications:    Dressing Appearance Intact;Dry;Clean 12/26/2018  2:00 PM   Drainage Amount Scant 12/26/2018  2:00 PM   Drainage Characteristics/Odor Serosanguineous 12/26/2018  2:00 PM   Appearance Red;Fibrin;Moist 12/26/2018  2:00 PM   Tissue loss description Full thickness 12/26/2018  2:00 PM   Red (%), Wound Tissue Color 75 % 12/26/2018  2:00 PM   Yellow (%), Wound Tissue Color 25 % 12/26/2018  2:00 PM   Periwound Area Edematous;Pink 12/26/2018  2:00 PM   Wound Edges Defined 12/26/2018  2:00 PM   Wound Length (cm) 2.8 cm 12/26/2018  2:00 PM   Wound Width (cm) 2 cm 12/26/2018  2:00 PM   Wound Depth (cm) 0.1 cm 12/26/2018  2:00 PM   Wound Volume (cm^3) 0.56 cm^3 12/26/2018  2:00 PM   Wound Surface Area (cm^2) 5.6 cm^2 12/26/2018  2:00 PM   Care Cleansed with:;Sterile normal saline 12/26/2018  2:00 PM   Dressing Changed;Abd pad 12/26/2018  2:00 PM   Compression Two layer compression 12/26/2018  2:00 PM   Dressing Change Due 12/28/18 12/26/2018  2:00 PM            Wound 11/21/18 1056 Venous Ulcer medial Leg #2 (Active)   11/21/18 1056    Pre-existing: Yes   Primary Wound Type: Venous ulcer   Side: Left   Orientation: medial   Location: Leg   Wound/PI Number (optional): #2   Ankle-Brachial Index:    Pulses:    Removal Indication and Assessment:    Wound Outcome:    (Retired) Wound Type:    (Retired) Wound Length (cm):    (Retired) Wound Width (cm):    (Retired) Depth (cm):    Wound Description (Comments):    Removal Indications:    Dressing Appearance Dry;Intact;Clean 12/26/2018  2:00 PM   Drainage Amount Moderate 12/26/2018  2:00 PM   Drainage Characteristics/Odor Serous 12/26/2018  2:00 PM   Appearance Red;Fibrin 12/26/2018  2:00 PM   Tissue loss description Partial thickness 12/26/2018  2:00 PM   Red (%), Wound Tissue Color 75 %  12/26/2018  2:00 PM   Periwound Area Edematous;Pink;Moist 12/26/2018  2:00 PM   Wound Edges Irregular 12/26/2018  2:00 PM   Wound Length (cm) 9 cm 12/26/2018  2:00 PM   Wound Width (cm) 10 cm 12/26/2018  2:00 PM   Wound Depth (cm) 0.1 cm 12/26/2018  2:00 PM   Wound Volume (cm^3) 9 cm^3 12/26/2018  2:00 PM   Wound Surface Area (cm^2) 90 cm^2 12/26/2018  2:00 PM   Care Cleansed with:;Sterile normal saline 12/26/2018  2:00 PM   Dressing Changed;Abd pad 12/26/2018  2:00 PM   Compression Two layer compression 12/26/2018  2:00 PM   Dressing Change Due 12/28/18 12/26/2018  2:00 PM            Wound 11/21/18 1058 Diabetic Ulcer Toe, second #3 (Active)   11/21/18 1058    Pre-existing: Yes   Primary Wound Type: Diabetic ulc   Side: Right   Orientation:    Location: Toe, second   Wound/PI Number (optional): #3   Ankle-Brachial Index:    Pulses:    Removal Indication and Assessment:    Wound Outcome:    (Retired) Wound Type:    (Retired) Wound Length (cm):    (Retired) Wound Width (cm):    (Retired) Depth (cm):    Wound Description (Comments):    Removal Indications:    Dressing Appearance Dry;Intact;Clean 12/26/2018  2:00 PM   Drainage Amount Small 12/26/2018  2:00 PM   Drainage Characteristics/Odor Serosanguineous 12/26/2018  2:00 PM   Appearance Red;Pink;Wet 12/26/2018  2:00 PM   Tissue loss description Full thickness 12/26/2018  2:00 PM   Red (%), Wound Tissue Color 75 % 12/26/2018  2:00 PM   Yellow (%), Wound Tissue Color 10 % 12/26/2018  2:00 PM   Periwound Area Edematous;Macerated 12/26/2018  2:00 PM   Wound Edges Defined 12/26/2018  2:00 PM   Wound Length (cm) 1.3 cm 12/26/2018  2:00 PM   Wound Width (cm) 1.4 cm 12/26/2018  2:00 PM   Wound Depth (cm) 0.3 cm 12/26/2018  2:00 PM   Wound Volume (cm^3) 0.55 cm^3 12/26/2018  2:00 PM   Wound Surface Area (cm^2) 1.82 cm^2 12/26/2018  2:00 PM   Care Cleansed with:;Sterile normal saline 12/26/2018  2:00 PM   Dressing Changed 12/26/2018  2:00 PM   Compression Two layer compression  12/26/2018  2:00 PM   Dressing Change Due 12/28/18 12/26/2018  2:00 PM     Wound #1: Right anterior lower leg venous ulcer; Wound #2: Left anterior medial lower leg venous ulcer    Cleanse wound with: Normal saline; gentian violet between toes, aquacel ag rope between all toes  Primary dressing:gentamycin ointment  Secondary dressing:Xeroform, large abd pad x 1, coflex with calamine toe to knee, cast padding x 1, darco    Wound #3: Right 2nd toe diabetic ulcer    Cleanse wound with:Normal saline  Periwound care:Gentian violet between all toes and any wet irritated areas  Primary dressing:aquacel ag rope to wound and between all toes  Secondary dressing:coflex with calamine toe to knee, cast padding x 1, darco    Rx for Bumex sent to pharmacy: increased from 1mg to 2mg daily.    Discontinue antibiotics.              Plan:              Follow up with Dr. Díaz in 2 weeks Wed 1/9/19

## 2018-12-27 ENCOUNTER — TELEPHONE (OUTPATIENT)
Dept: ADMINISTRATIVE | Facility: CLINIC | Age: 73
End: 2018-12-27

## 2018-12-27 NOTE — TELEPHONE ENCOUNTER
Home Health SOC 11/26/2018 - 01/24/2019 with [facility] - Dr. Malia Díaz. Patient received  services.

## 2019-01-09 ENCOUNTER — HOSPITAL ENCOUNTER (OUTPATIENT)
Dept: WOUND CARE | Facility: HOSPITAL | Age: 74
Discharge: HOME | End: 2019-01-09
Attending: SURGERY
Payer: MEDICARE

## 2019-01-09 VITALS
DIASTOLIC BLOOD PRESSURE: 63 MMHG | HEIGHT: 67 IN | TEMPERATURE: 98 F | HEART RATE: 67 BPM | SYSTOLIC BLOOD PRESSURE: 139 MMHG | BODY MASS INDEX: 45.99 KG/M2 | WEIGHT: 293 LBS

## 2019-01-09 DIAGNOSIS — I87.2 VENOUS STASIS DERMATITIS OF BOTH LOWER EXTREMITIES: Primary | ICD-10-CM

## 2019-01-09 DIAGNOSIS — R60.0 BILATERAL LEG EDEMA: ICD-10-CM

## 2019-01-09 DIAGNOSIS — I87.2 VENOUS INSUFFICIENCY OF BOTH LOWER EXTREMITIES: ICD-10-CM

## 2019-01-09 PROCEDURE — 29581 APPL MULTLAYER CMPRN SYS LEG: CPT | Mod: HCNC,50

## 2019-01-09 NOTE — PROGRESS NOTES
[facility]                Progress Note    Subjective:       Patient ID: Sherin Ortiz is a 74 y.o. female.    Chief Complaint: Venous Stasis    Chief Complaint: Venous Ulcer     Chief Complaint: Wound Care     Patient admitted to the [facility] with non healing wound to bilateral legs and feet.  Hx of DM, ESRD, PVD.  Rx sent to pharmacy for Gentamycin, Bumex,and Clindamycin.  Patient resides at home and cares for her sister.  She is non ambulatory and used and motorized scooter for mobility.  Home Health ordered.    12/5/18: F/U in clinic with Dr. Díaz. BLE edematous left greater than right and red. Dr. Díaz to order ultrasound of LLE. Patient to increase her diuretic and restart Abx. Dr. Díaz to send RX to pharmacy.  12/26/18:  Right 2nd toe web space wound macerated.  Home health has been applying gentimicin to wound with no gentian violet.  Legs remain edematous.  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  Dr. Díaz increased Bumex from 1mg to 2mg daily.  Patient was instructed on medication dose change and importance of elevated her legs to reduce the edema.  Verbalized understanding.    1/9/19:  New wound to right medial heel.  Re occurring stage III pressure ulcer presents as an intact blister with deep tissue injury.          No c/o noted.    Review of Systems      Objective:   Wounds as noted w/o Triston's sign or s/s of infection.  R heel DTI is not open & has no s/s of infection.     Physical Exam    Vitals:    01/09/19 1610   BP: 139/63   Pulse: 67   Temp: 97.9 °F (36.6 °C)       Assessment:           ICD-10-CM ICD-9-CM   1. Venous stasis dermatitis of both lower extremities I87.2 454.1   2. Bilateral leg edema R60.0 782.3   3. Venous insufficiency of both lower extremities I87.2 459.81   4. Uncontrolled type 2 diabetes mellitus with stage 3 chronic kidney disease, with  long-term current use of insulin E11.22 250.52    E11.65 585.3    N18.3 V58.67    Z79.4             Pressure Injury 01/09/19 1513 Right medial Heel #4 Stage 3 (Active)   01/09/19 1513    Pressure Injury Present on Admission:    Side: Right   Orientation: medial   Location: Heel   Wound/PI Number (optional): #4   Is this injury device related?:    Staging: 3   Removal Indication and Assessment:    Wound Outcome:    (Retired) Wound Length (cm):    (Retired) Wound Width (cm):    (Retired) Depth (cm):    Wound Description (Comments):    Removal Indications:    Wound Image   1/9/2019  3:00 PM   Staging 3 1/9/2019  3:00 PM   Dressing Appearance Intact 1/9/2019  3:00 PM   Drainage Amount None 1/9/2019  3:00 PM   Appearance Gray;Pink;Blistered;Dry;Ecchymotic 1/9/2019  3:00 PM   Black (%), Wound Tissue Color 25 % 1/9/2019  3:00 PM   Red (%), Wound Tissue Color 25 % 1/9/2019  3:00 PM   Periwound Area Intact;Edematous 1/9/2019  3:00 PM   Wound Edges Rolled/closed 1/9/2019  3:00 PM   Wound Length (cm) 3 cm 1/9/2019  3:00 PM   Wound Width (cm) 4.6 cm 1/9/2019  3:00 PM   Wound Surface Area (cm^2) 13.8 cm^2 1/9/2019  3:00 PM   Off Loading Football dressing 1/9/2019  3:00 PM   Dressing Change Due 01/11/19 1/9/2019  3:00 PM            Wound 11/21/18 1045 Venous Ulcer anterior Leg #1 (Active)   11/21/18 1045    Pre-existing: Yes   Primary Wound Type: Venous ulcer   Side: Right   Orientation: anterior   Location: Leg   Wound/PI Number (optional): #1   Ankle-Brachial Index:    Pulses:    Removal Indication and Assessment:    Wound Outcome:    (Retired) Wound Type:    (Retired) Wound Length (cm):    (Retired) Wound Width (cm):    (Retired) Depth (cm):    Wound Description (Comments):    Removal Indications:    Wound Image   1/9/2019  3:00 PM   Dressing Appearance Intact;Moist drainage 1/9/2019  3:00 PM   Drainage Amount Moderate 1/9/2019  3:00 PM   Drainage Characteristics/Odor Serosanguineous 1/9/2019  3:00 PM   Appearance  Red;Moist;Fibrin 1/9/2019  3:00 PM   Tissue loss description Full thickness 1/9/2019  3:00 PM   Red (%), Wound Tissue Color 75 % 1/9/2019  3:00 PM   Yellow (%), Wound Tissue Color 20 % 1/9/2019  3:00 PM   Periwound Area Moist;Pink;Edematous;Hemosiderin Staining 1/9/2019  3:00 PM   Wound Edges Defined;Open 1/9/2019  3:00 PM   Wound Length (cm) 2.8 cm 1/9/2019  3:00 PM   Wound Width (cm) 2.3 cm 1/9/2019  3:00 PM   Wound Depth (cm) 0.1 cm 1/9/2019  3:00 PM   Wound Volume (cm^3) 0.64 cm^3 1/9/2019  3:00 PM   Wound Surface Area (cm^2) 6.44 cm^2 1/9/2019  3:00 PM   Care Cleansed with:;Sterile normal saline 1/9/2019  3:00 PM   Dressing Non-adherent;Changed 1/9/2019  3:00 PM   Compression Two layer compression 1/9/2019  3:00 PM   Off Loading Football dressing 1/9/2019  3:00 PM   Dressing Change Due 01/11/19 1/9/2019  3:00 PM            Wound 11/21/18 1056 Venous Ulcer medial Leg #2 (Active)   11/21/18 1056    Pre-existing: Yes   Primary Wound Type: Venous ulcer   Side: Left   Orientation: medial   Location: Leg   Wound/PI Number (optional): #2   Ankle-Brachial Index:    Pulses:    Removal Indication and Assessment:    Wound Outcome:    (Retired) Wound Type:    (Retired) Wound Length (cm):    (Retired) Wound Width (cm):    (Retired) Depth (cm):    Wound Description (Comments):    Removal Indications:    Wound Image   1/9/2019  3:00 PM   Dressing Appearance Intact;Moist drainage 1/9/2019  3:00 PM   Drainage Amount Small 1/9/2019  3:00 PM   Drainage Characteristics/Odor Serosanguineous 1/9/2019  3:00 PM   Appearance Pink;Moist;Red;Fibrin 1/9/2019  3:00 PM   Tissue loss description Full thickness 1/9/2019  3:00 PM   Red (%), Wound Tissue Color 75 % 1/9/2019  3:00 PM   Yellow (%), Wound Tissue Color 20 % 1/9/2019  3:00 PM   Periwound Area Moist;Hemosiderin Staining;Edematous 1/9/2019  3:00 PM   Wound Edges Defined;Open 1/9/2019  3:00 PM   Wound Length (cm) 1 cm 1/9/2019  3:00 PM   Wound Width (cm) 1.2 cm 1/9/2019  3:00 PM    Wound Depth (cm) 0.1 cm 1/9/2019  3:00 PM   Wound Volume (cm^3) 0.12 cm^3 1/9/2019  3:00 PM   Wound Surface Area (cm^2) 1.2 cm^2 1/9/2019  3:00 PM   Care Cleansed with:;Sterile normal saline 1/9/2019  3:00 PM   Dressing Changed;Non-adherent 1/9/2019  3:00 PM   Compression Two layer compression 1/9/2019  3:00 PM   Off Loading Football dressing 1/9/2019  3:00 PM   Dressing Change Due 01/11/19 1/9/2019  3:00 PM            Wound 11/21/18 1058 Diabetic Ulcer Toe, second #3 (Active)   11/21/18 1058    Pre-existing: Yes   Primary Wound Type: Diabetic ulc   Side: Right   Orientation:    Location: Toe, second   Wound/PI Number (optional): #3   Ankle-Brachial Index:    Pulses:    Removal Indication and Assessment:    Wound Outcome:    (Retired) Wound Type:    (Retired) Wound Length (cm):    (Retired) Wound Width (cm):    (Retired) Depth (cm):    Wound Description (Comments):    Removal Indications:    Wound Image   1/9/2019  3:00 PM   Dressing Appearance Intact;Moist drainage 1/9/2019  3:00 PM   Drainage Amount Moderate 1/9/2019  3:00 PM   Drainage Characteristics/Odor Serosanguineous;Yellow 1/9/2019  3:00 PM   Appearance Pink;Moist;Fibrin 1/9/2019  3:00 PM   Tissue loss description Full thickness 1/9/2019  3:00 PM   Red (%), Wound Tissue Color 75 % 1/9/2019  3:00 PM   Yellow (%), Wound Tissue Color 20 % 1/9/2019  3:00 PM   Periwound Area Moist;Edematous 1/9/2019  3:00 PM   Wound Edges Defined;Open 1/9/2019  3:00 PM   Wound Length (cm) 1.1 cm 1/9/2019  3:00 PM   Wound Width (cm) 1.5 cm 1/9/2019  3:00 PM   Wound Depth (cm) 0.3 cm 1/9/2019  3:00 PM   Wound Volume (cm^3) 0.5 cm^3 1/9/2019  3:00 PM   Wound Surface Area (cm^2) 1.65 cm^2 1/9/2019  3:00 PM   Care Cleansed with:;Sterile normal saline 1/9/2019  3:00 PM   Dressing Changed;Silver 1/9/2019  3:00 PM   Off Loading Football dressing 1/9/2019  3:00 PM   Dressing Change Due 01/11/19 1/9/2019  3:00 PM     Wound #1: Right anterior lower leg venous ulcer; Wound #2: Left  anterior medial lower leg venous ulcer    Cleanse wound with: Normal saline; gentian violet between toes, aquacel ag rope between all toes  Primary dressing:gentamycin ointment  Secondary dressing:Xeroform, large abd pad x 1, coflex with calamine toe to knee, Tin foam to heel, cast padding x 2, darco    Wound #3: Right 2nd toe diabetic ulcer    Cleanse wound with:Normal saline  Periwound care:Gentian violet between all toes and any wet irritated areas  Primary dressing:aquacel ag rope to wound and between all toes  Secondary dressing:coflex with calamine toe to knee, cast padding x 3, darco        Wound #4: Right medial heel pressure ulcer (Stage III)  Intact blister/deep tissue injury    Cleanse wound with: Normal saline  Primary dressing: tin foam with hole cut out over wound followed by 2 additional tin foam.  Secondary dressing:coflex with calamine toe to knee, cast padding x 3, darco  APPLY 2 tin foam to Left heel as well    Plan:              Follow up with Dr. Díaz in 2 weeks, Wed 1/23/19

## 2019-01-21 RX ORDER — BLOOD SUGAR DIAGNOSTIC
STRIP MISCELLANEOUS
Qty: 200 STRIP | Refills: 3 | Status: SHIPPED | OUTPATIENT
Start: 2019-01-21 | End: 2020-02-06

## 2019-01-23 ENCOUNTER — OFFICE VISIT (OUTPATIENT)
Dept: WOUND CARE | Facility: CLINIC | Age: 74
End: 2019-01-23
Payer: MEDICARE

## 2019-01-23 VITALS
SYSTOLIC BLOOD PRESSURE: 149 MMHG | TEMPERATURE: 98 F | BODY MASS INDEX: 49.65 KG/M2 | DIASTOLIC BLOOD PRESSURE: 69 MMHG | HEIGHT: 67 IN | HEART RATE: 71 BPM

## 2019-01-23 DIAGNOSIS — L97.911 ULCER OF RIGHT LOWER EXTREMITY, LIMITED TO BREAKDOWN OF SKIN: Primary | ICD-10-CM

## 2019-01-23 DIAGNOSIS — B35.3 TINEA PEDIS OF BOTH FEET: Chronic | ICD-10-CM

## 2019-01-23 DIAGNOSIS — L97.512 SKIN ULCER OF TOE OF RIGHT FOOT WITH FAT LAYER EXPOSED: ICD-10-CM

## 2019-01-23 DIAGNOSIS — I87.2 VENOUS INSUFFICIENCY OF BOTH LOWER EXTREMITIES: Chronic | ICD-10-CM

## 2019-01-23 DIAGNOSIS — L97.521 SKIN ULCER OF LEFT FOOT, LIMITED TO BREAKDOWN OF SKIN: ICD-10-CM

## 2019-01-23 DIAGNOSIS — I87.2 VENOUS STASIS DERMATITIS OF BOTH LOWER EXTREMITIES: Chronic | ICD-10-CM

## 2019-01-23 DIAGNOSIS — R60.0 BILATERAL LEG EDEMA: ICD-10-CM

## 2019-01-23 DIAGNOSIS — L97.922 ULCER OF LOWER LIMB, LEFT, WITH FAT LAYER EXPOSED: ICD-10-CM

## 2019-01-23 PROBLEM — L97.901 ULCER OF LOWER EXTREMITY, LIMITED TO BREAKDOWN OF SKIN: Status: ACTIVE | Noted: 2018-05-10

## 2019-01-23 PROCEDURE — 3078F DIAST BP <80 MM HG: CPT | Mod: HCNC,CPTII,S$GLB, | Performed by: NURSE PRACTITIONER

## 2019-01-23 PROCEDURE — 99999 PR PBB SHADOW E&M-EST. PATIENT-LVL V: CPT | Mod: PBBFAC,HCNC,, | Performed by: NURSE PRACTITIONER

## 2019-01-23 PROCEDURE — 1101F PT FALLS ASSESS-DOCD LE1/YR: CPT | Mod: HCNC,CPTII,S$GLB, | Performed by: NURSE PRACTITIONER

## 2019-01-23 PROCEDURE — 99999 PR PBB SHADOW E&M-EST. PATIENT-LVL V: ICD-10-PCS | Mod: PBBFAC,HCNC,, | Performed by: NURSE PRACTITIONER

## 2019-01-23 PROCEDURE — 29580 PR STRAPPING UNNA BOOT: ICD-10-PCS | Mod: 50,HCNC,S$GLB, | Performed by: NURSE PRACTITIONER

## 2019-01-23 PROCEDURE — 1101F PR PT FALLS ASSESS DOC 0-1 FALLS W/OUT INJ PAST YR: ICD-10-PCS | Mod: HCNC,CPTII,S$GLB, | Performed by: NURSE PRACTITIONER

## 2019-01-23 PROCEDURE — 99212 OFFICE O/P EST SF 10 MIN: CPT | Mod: 25,HCNC,S$GLB, | Performed by: NURSE PRACTITIONER

## 2019-01-23 PROCEDURE — 3077F PR MOST RECENT SYSTOLIC BLOOD PRESSURE >= 140 MM HG: ICD-10-PCS | Mod: HCNC,CPTII,S$GLB, | Performed by: NURSE PRACTITIONER

## 2019-01-23 PROCEDURE — 29580 STRAPPING UNNA BOOT: CPT | Mod: 50,HCNC,S$GLB, | Performed by: NURSE PRACTITIONER

## 2019-01-23 PROCEDURE — 99212 PR OFFICE/OUTPT VISIT, EST, LEVL II, 10-19 MIN: ICD-10-PCS | Mod: 25,HCNC,S$GLB, | Performed by: NURSE PRACTITIONER

## 2019-01-23 PROCEDURE — 3077F SYST BP >= 140 MM HG: CPT | Mod: HCNC,CPTII,S$GLB, | Performed by: NURSE PRACTITIONER

## 2019-01-23 PROCEDURE — 3078F PR MOST RECENT DIASTOLIC BLOOD PRESSURE < 80 MM HG: ICD-10-PCS | Mod: HCNC,CPTII,S$GLB, | Performed by: NURSE PRACTITIONER

## 2019-01-23 NOTE — PROGRESS NOTES
Subjective:       Patient ID: Sherin Ortiz is a 74 y.o. female.    Chief Complaint: Wound Check    Wound Check     Wound Check:   This patient is well known to my service.  She has been being seen at [facility] since November 2018.  She wishes to transfer her care here.  She is seen today for evaluation and management of recurrent ulcers to both feet and lower lets.  She has home health services through [facility]. Coflex compression wraps are on both legs and modified football dressings are on both feet. She reports that the wounds are not healing.  Problems that interfere with wound healing include multiple co-morbidities, diabetes, edema, diabetic neuropathy and  morbid obesity. The patient is afebrile. The patient ambulates with great difficulty secondary to her body habitus.  Her pain level is 4/10.  She denies increased swelling, redness or purulent drainage.   She is afebrile.   Review of Systems   Constitutional: Negative for chills, diaphoresis and fever.   HENT: Negative for hearing loss, postnasal drip, rhinorrhea, sinus pressure, sneezing, sore throat, tinnitus and trouble swallowing.    Eyes: Negative for visual disturbance.   Respiratory: Negative for apnea, cough, shortness of breath and wheezing.    Cardiovascular: Positive for leg swelling. Negative for chest pain and palpitations.   Gastrointestinal: Negative for constipation, diarrhea, nausea and vomiting.   Genitourinary: Negative for difficulty urinating, dysuria, frequency and hematuria.   Musculoskeletal: Negative for arthralgias, back pain and joint swelling.   Skin: Positive for wound.   Neurological: Negative for dizziness, weakness, light-headedness and headaches.   Hematological: Does not bruise/bleed easily.   Psychiatric/Behavioral: Negative for confusion, decreased concentration, dysphoric mood and sleep disturbance. The patient is not nervous/anxious.    All other systems reviewed and are negative.      Objective:       Physical Exam   Constitutional: She is oriented to person, place, and time. No distress.   Morbidly obese   HENT:   Head: Normocephalic and atraumatic.   Neck: Normal range of motion. Neck supple.   Pulmonary/Chest: Effort normal.   Musculoskeletal: Normal range of motion. She exhibits edema. She exhibits no tenderness.        Legs:       Feet:    Neurological: She is alert and oriented to person, place, and time.   Skin: Skin is warm and dry. Rash (dermatitis and tinea bilateral lower extremities) noted. She is not diaphoretic. No cyanosis or erythema. No pallor. Nails show no clubbing.   Psychiatric: She has a normal mood and affect. Her behavior is normal. Judgment and thought content normal.   Nursing note and vitals reviewed.    ..  Hemoglobin A1C   Date Value Ref Range Status   08/13/2018 10.6 (H) 4.0 - 5.6 % Final     Comment:     ADA Screening Guidelines:  5.7-6.4%  Consistent with prediabetes  >or=6.5%  Consistent with diabetes  High levels of fetal hemoglobin interfere with the HbA1C  assay. Heterozygous hemoglobin variants (HbS, HgC, etc)do  not significantly interfere with this assay.   However, presence of multiple variants may affect accuracy.     10/09/2017 9.9 (H) 4.0 - 5.6 % Final     Comment:     According to ADA guidelines, hemoglobin A1c <7.0% represents  optimal control in non-pregnant diabetic patients. Different  metrics may apply to specific patient populations.   Standards of Medical Care in Diabetes-2016.  For the purpose of screening for the presence of diabetes:  <5.7%     Consistent with the absence of diabetes  5.7-6.4%  Consistent with increasing risk for diabetes   (prediabetes)  >or=6.5%  Consistent with diabetes  Currently, no consensus exists for use of hemoglobin A1c  for diagnosis of diabetes for children.  This Hemoglobin A1c assay has significant interference with fetal   hemoglobin   (HbF). The results are invalid for patients with abnormal amounts of   HbF,   including  those with known Hereditary Persistence   of Fetal Hemoglobin. Heterozygous hemoglobin variants (HbAS, HbAC,   HbAD, HbAE, HbA2) do not significantly interfere with this assay;   however, presence of multiple variants in a sample may impact the %   interference.     07/06/2017 10.0 (H) 4.0 - 5.6 % Final     Comment:     According to ADA guidelines, hemoglobin A1c <7.0% represents  optimal control in non-pregnant diabetic patients. Different  metrics may apply to specific patient populations.   Standards of Medical Care in Diabetes-2016.  For the purpose of screening for the presence of diabetes:  <5.7%     Consistent with the absence of diabetes  5.7-6.4%  Consistent with increasing risk for diabetes   (prediabetes)  >or=6.5%  Consistent with diabetes  Currently, no consensus exists for use of hemoglobin A1c  for diagnosis of diabetes for children.  This Hemoglobin A1c assay has significant interference with fetal   hemoglobin   (HbF). The results are invalid for patients with abnormal amounts of   HbF,   including those with known Hereditary Persistence   of Fetal Hemoglobin. Heterozygous hemoglobin variants (HbAS, HbAC,   HbAD, HbAE, HbA2) do not significantly interfere with this assay;   however, presence of multiple variants in a sample may impact the %   interference.       ..  Lab Results   Component Value Date    ALBUMIN 3.3 (L) 08/13/2018       Assessment:       1. Ulcer of right lower extremity, limited to breakdown of skin    2. Ulcer of lower limb, left, with fat layer exposed    3. Skin ulcer of left foot, limited to breakdown of skin    4. Skin ulcer of toe of right foot with fat layer exposed    5. Venous insufficiency of both lower extremities    6. Tinea pedis of both feet    7. Venous stasis dermatitis of both lower extremities    8. Bilateral leg edema        Plan:           Unna boots bilateral lower legs as detailed above.  Cotton in between toes.  Apply hydrofiber to right second toe ulcer,  cover with cotton gauze and secure with roll gauze.  Return to clinic in 3-4 weeks.  [facility] notified of orders via email. We will ask them to continue to see the patient three times weekly.    Home Health Orders:    Cleanse wounds with wound cleanser.  Pad ankles with ABD pads.  Medihoney gel to left medial let ulcer and cover with hydrofiber.  Hydrofiber to remaining wounds.  Unna boot (zinc oxide with calamine, kerlix and coban) bilateral lower legs.  Cotton in between the toes right foot, cover with gauze and secure with roll gauze.          Left medial leg    Right anterior leg    Left dorsal foot    Right second toe    Right medial heel

## 2019-02-06 ENCOUNTER — TELEPHONE (OUTPATIENT)
Dept: WOUND CARE | Facility: CLINIC | Age: 74
End: 2019-02-06

## 2019-02-06 NOTE — TELEPHONE ENCOUNTER
----- Message from Marya Lundy sent at 2/6/2019 11:58 AM CST -----  Contact: Ms.Jamie [facility] [phone] (call anytime)   Needs Advice    Reason for call:   states Pt. Left foot third toe has developed blister and would like to know if it can be padded down for know with gauze          Communication Preference:  Ms.Jamie [facility] [phone] (call anytime)     Additional Information:    Thank You

## 2019-02-08 ENCOUNTER — TELEPHONE (OUTPATIENT)
Dept: WOUND CARE | Facility: CLINIC | Age: 74
End: 2019-02-08

## 2019-02-08 NOTE — TELEPHONE ENCOUNTER
----- Message from Marya Lundy sent at 2/8/2019 10:49 AM CST -----  Contact:  [facility] [phone] (call anytime)   Needs Advice    Reason for call:   states Pt. Has 2 new open wound areas right big toe & left calf along with blister to left heel and would like to know what to do         Communication Preference:   [facility] [phone] (call anytime)     Additional Information:    Thank You

## 2019-02-12 ENCOUNTER — TELEPHONE (OUTPATIENT)
Dept: WOUND CARE | Facility: CLINIC | Age: 74
End: 2019-02-12

## 2019-02-12 NOTE — TELEPHONE ENCOUNTER
----- Message from Harsha Sorensen sent at 2/12/2019  4:07 PM CST -----  Contact: [facility]  Calling for wound care orders. Caller states pt has a new wound on great toe, open blister.       [phone]

## 2019-02-13 ENCOUNTER — OFFICE VISIT (OUTPATIENT)
Dept: WOUND CARE | Facility: CLINIC | Age: 74
End: 2019-02-13
Payer: MEDICARE

## 2019-02-13 VITALS
DIASTOLIC BLOOD PRESSURE: 80 MMHG | SYSTOLIC BLOOD PRESSURE: 141 MMHG | HEART RATE: 78 BPM | TEMPERATURE: 97 F | BODY MASS INDEX: 49.65 KG/M2 | HEIGHT: 67 IN

## 2019-02-13 DIAGNOSIS — L97.911 ULCER OF RIGHT LOWER EXTREMITY, LIMITED TO BREAKDOWN OF SKIN: ICD-10-CM

## 2019-02-13 DIAGNOSIS — I87.2 VENOUS STASIS DERMATITIS OF BOTH LOWER EXTREMITIES: Chronic | ICD-10-CM

## 2019-02-13 DIAGNOSIS — L97.922 ULCER OF LOWER LIMB, LEFT, WITH FAT LAYER EXPOSED: Primary | ICD-10-CM

## 2019-02-13 DIAGNOSIS — L97.412 ULCER OF RIGHT HEEL AND MIDFOOT WITH FAT LAYER EXPOSED: ICD-10-CM

## 2019-02-13 DIAGNOSIS — B35.3 TINEA PEDIS OF BOTH FEET: Chronic | ICD-10-CM

## 2019-02-13 DIAGNOSIS — L97.421 ULCER OF LEFT HEEL AND MIDFOOT, LIMITED TO BREAKDOWN OF SKIN: ICD-10-CM

## 2019-02-13 DIAGNOSIS — L97.521 ULCER OF TOE OF LEFT FOOT, LIMITED TO BREAKDOWN OF SKIN: ICD-10-CM

## 2019-02-13 DIAGNOSIS — I87.2 VENOUS INSUFFICIENCY OF BOTH LOWER EXTREMITIES: Chronic | ICD-10-CM

## 2019-02-13 DIAGNOSIS — L97.512 SKIN ULCER OF TOE OF RIGHT FOOT WITH FAT LAYER EXPOSED: ICD-10-CM

## 2019-02-13 DIAGNOSIS — R60.0 BILATERAL LEG EDEMA: ICD-10-CM

## 2019-02-13 PROCEDURE — 29580 STRAPPING UNNA BOOT: CPT | Mod: 50,HCNC,S$GLB, | Performed by: NURSE PRACTITIONER

## 2019-02-13 PROCEDURE — 99499 NO LOS: ICD-10-PCS | Mod: HCNC,S$GLB,, | Performed by: NURSE PRACTITIONER

## 2019-02-13 PROCEDURE — 29580 PR STRAPPING UNNA BOOT: ICD-10-PCS | Mod: 50,HCNC,S$GLB, | Performed by: NURSE PRACTITIONER

## 2019-02-13 PROCEDURE — 99499 UNLISTED E&M SERVICE: CPT | Mod: HCNC,S$GLB,, | Performed by: NURSE PRACTITIONER

## 2019-02-13 PROCEDURE — 99999 PR PBB SHADOW E&M-EST. PATIENT-LVL V: CPT | Mod: PBBFAC,HCNC,, | Performed by: NURSE PRACTITIONER

## 2019-02-13 PROCEDURE — 99999 PR PBB SHADOW E&M-EST. PATIENT-LVL V: ICD-10-PCS | Mod: PBBFAC,HCNC,, | Performed by: NURSE PRACTITIONER

## 2019-02-13 RX ORDER — KETOCONAZOLE 20 MG/G
CREAM TOPICAL DAILY
Qty: 60 G | Refills: 2 | Status: ON HOLD | OUTPATIENT
Start: 2019-02-13 | End: 2019-03-27

## 2019-02-13 NOTE — PROGRESS NOTES
Subjective:       Patient ID: Sherin Ortiz is a 74 y.o. female.    Chief Complaint: Wound Check    Wound Check     Wound Check:   This patient is seen today for reevaluation of recurrent ulcers to both feet and lower legs.  She has more ulcers today than on the last visit. She has home health services through [facility]. Unna boots are being applied to both lower legs.  However, she removed them today to shower.  She has ace wraps on her legs today. Problems that interfere with wound healing include multiple co-morbidities, diabetes, edema, diabetic neuropathy and  morbid obesity. Because of safety issues we are unable to weigh the patient as she is unstable on her feet.  The patient is afebrile. The patient ambulates with great difficulty secondary to her body habitus. She does not have any pain.  She denies increased swelling, redness or purulent drainage.     Review of Systems     Unchanged from previous visit.  Objective:      Physical Exam   Constitutional: She is oriented to person, place, and time. No distress.   Morbidly obese   HENT:   Head: Normocephalic and atraumatic.   Neck: Normal range of motion. Neck supple.   Pulmonary/Chest: Effort normal.   Musculoskeletal: Normal range of motion. She exhibits edema. She exhibits no tenderness.        Legs:       Feet:    Neurological: She is alert and oriented to person, place, and time.   Skin: Skin is warm and dry. Rash (dermatitis and tinea bilateral lower extremities) noted. She is not diaphoretic. No cyanosis or erythema. No pallor. Nails show no clubbing.   Psychiatric: She has a normal mood and affect. Her behavior is normal. Judgment and thought content normal.   Nursing note and vitals reviewed.    ..  Hemoglobin A1C   Date Value Ref Range Status   08/13/2018 10.6 (H) 4.0 - 5.6 % Final     Comment:     ADA Screening Guidelines:  5.7-6.4%  Consistent with prediabetes  >or=6.5%  Consistent with diabetes  High levels of fetal hemoglobin interfere with  the HbA1C  assay. Heterozygous hemoglobin variants (HbS, HgC, etc)do  not significantly interfere with this assay.   However, presence of multiple variants may affect accuracy.     10/09/2017 9.9 (H) 4.0 - 5.6 % Final     Comment:     According to ADA guidelines, hemoglobin A1c <7.0% represents  optimal control in non-pregnant diabetic patients. Different  metrics may apply to specific patient populations.   Standards of Medical Care in Diabetes-2016.  For the purpose of screening for the presence of diabetes:  <5.7%     Consistent with the absence of diabetes  5.7-6.4%  Consistent with increasing risk for diabetes   (prediabetes)  >or=6.5%  Consistent with diabetes  Currently, no consensus exists for use of hemoglobin A1c  for diagnosis of diabetes for children.  This Hemoglobin A1c assay has significant interference with fetal   hemoglobin   (HbF). The results are invalid for patients with abnormal amounts of   HbF,   including those with known Hereditary Persistence   of Fetal Hemoglobin. Heterozygous hemoglobin variants (HbAS, HbAC,   HbAD, HbAE, HbA2) do not significantly interfere with this assay;   however, presence of multiple variants in a sample may impact the %   interference.     07/06/2017 10.0 (H) 4.0 - 5.6 % Final     Comment:     According to ADA guidelines, hemoglobin A1c <7.0% represents  optimal control in non-pregnant diabetic patients. Different  metrics may apply to specific patient populations.   Standards of Medical Care in Diabetes-2016.  For the purpose of screening for the presence of diabetes:  <5.7%     Consistent with the absence of diabetes  5.7-6.4%  Consistent with increasing risk for diabetes   (prediabetes)  >or=6.5%  Consistent with diabetes  Currently, no consensus exists for use of hemoglobin A1c  for diagnosis of diabetes for children.  This Hemoglobin A1c assay has significant interference with fetal   hemoglobin   (HbF). The results are invalid for patients with abnormal  amounts of   HbF,   including those with known Hereditary Persistence   of Fetal Hemoglobin. Heterozygous hemoglobin variants (HbAS, HbAC,   HbAD, HbAE, HbA2) do not significantly interfere with this assay;   however, presence of multiple variants in a sample may impact the %   interference.       ..  Lab Results   Component Value Date    ALBUMIN 3.3 (L) 08/13/2018     Sherin was seen in the clinic room and placed in the supine position on the treatment table.  Both legs were cleansed with Easi-clense sponges and dried thoroughly. A hydrofiber dressing was applied to the left leg wounds and a hydrofiber dressing was applied to the right shin wound.  Eucerin cream was applied to the lower legs.  The patient's feet were positioned at a 90 degree angle.  A zinc oxide wrap, followed by kerlix roll gauze and coban were applied using a spiral technique avoiding creases or folds.  The wraps were started behind the first metatarsal and ended below the tibial tubercle of the knee.  There was overlap of each turn half the width of the previous turn.  The compression wraps will be changed every 2-3 days.    Assessment:       1. Ulcer of lower limb, left, with fat layer exposed    2. Ulcer of right lower extremity, limited to breakdown of skin    3. Skin ulcer of toe of right foot with fat layer exposed    4. Ulcer of toe of left foot, limited to breakdown of skin    5. Ulcer of right heel and midfoot with fat layer exposed    6. Ulcer of left heel and midfoot, limited to breakdown of skin    7. Venous insufficiency of both lower extremities    8. Tinea pedis of both feet    9. Venous stasis dermatitis of both lower extremities    10. Bilateral leg edema        Plan:           Unna boots bilateral lower legs as detailed above.  Cotton in between toes.  Apply medihoney gel  to right second toe wound.  Apply hydrofiber to left third toe ulcer.  Place cotton in between toes, cover with cotton gauze and secure with roll  gauze.  Patient was warned not to get the dressings wet and to use cast covers for showering.  Should the dressing become wet, she is to remove it, place a wet-to-dry dressing over the wound, cover with gauze and roll gauze and use ace wraps for compression and to secure bandages.  She should then notify home health as soon as possible to have a new dressing applied.  Return to clinic in 4 weeks.  [facility] notified of orders via email. We will ask them to continue to see the patient three times weekly.    Home Health Orders:    Triamcinolone cream to bilateral lower legs.  Ketoconazole cream to bot feet.  Cleanse wounds with wound cleanser.  Pad ankles with ABD pads.  Medihoney gel to left medial leg ulcer and cover with hydrofiber.  Apply medihoney gel to right second toe ulcer and cover with hydrofiber.   Hydrofiber to remaining wounds.  Unna boot (zinc oxide with calamine, kerlix and coban) bilateral lower legs.  Cotton in between the toes both feet, cover with gauze and secure with roll gauze.  Change dressings three times weekly.    Skilled nurse visit-3 x weekly            Left medial leg      Left posterior leg      Left dorsal foot      Left medial heel      Right anterior leg      Right second toe      Right medial heel

## 2019-02-13 NOTE — PATIENT INSTRUCTIONS
instu  Elevate legs as much as possible. Do not get the dressings wet and use cast covers for showering.  Should the dressing become wet, remove it, place a wet-to-dry dressing over the wound, cover with gauze and roll gauze and use ace wraps for compression and to secure bandages.  Notify home health as soon as possible to have a new dressing applied.

## 2019-02-15 ENCOUNTER — PES CALL (OUTPATIENT)
Dept: ADMINISTRATIVE | Facility: CLINIC | Age: 74
End: 2019-02-15

## 2019-02-16 ENCOUNTER — TELEPHONE (OUTPATIENT)
Dept: ADMINISTRATIVE | Facility: CLINIC | Age: 74
End: 2019-02-16

## 2019-02-19 ENCOUNTER — TELEPHONE (OUTPATIENT)
Dept: WOUND CARE | Facility: CLINIC | Age: 74
End: 2019-02-19

## 2019-02-19 NOTE — TELEPHONE ENCOUNTER
----- Message from Mia Browning sent at 2/19/2019 12:39 PM CST -----  Contact: Abner from [facility] [place] [facility] can be reached [phone]    Ms. Sherin mccormick [[id number]] has two new wounds on left leg left posteria calf and heel .. Need wound care orders for her.     Please give Abner a call back    Thank you!

## 2019-02-24 RX ORDER — ATORVASTATIN CALCIUM 40 MG/1
TABLET, FILM COATED ORAL
Qty: 90 TABLET | Refills: 3 | Status: SHIPPED | OUTPATIENT
Start: 2019-02-24 | End: 2020-03-11

## 2019-02-24 RX ORDER — CLOPIDOGREL BISULFATE 75 MG/1
TABLET ORAL
Qty: 90 TABLET | Refills: 3 | Status: SHIPPED | OUTPATIENT
Start: 2019-02-24 | End: 2020-03-19

## 2019-03-06 ENCOUNTER — TELEPHONE (OUTPATIENT)
Dept: WOUND CARE | Facility: CLINIC | Age: 74
End: 2019-03-06

## 2019-03-06 NOTE — TELEPHONE ENCOUNTER
----- Message from Anna Robertson LPN sent at 3/6/2019  7:15 AM CST -----  Abner home health nurse states right second toe is draining and has an odor - please call [phone].  This was from a phone message from yesterday. She is asking for new wound care orders.    Anna

## 2019-03-12 RX ORDER — INSULIN GLARGINE 100 [IU]/ML
INJECTION, SOLUTION SUBCUTANEOUS
Qty: 30 ML | Refills: 3 | Status: SHIPPED | OUTPATIENT
Start: 2019-03-12 | End: 2020-03-19

## 2019-03-13 ENCOUNTER — OFFICE VISIT (OUTPATIENT)
Dept: WOUND CARE | Facility: CLINIC | Age: 74
End: 2019-03-13
Payer: MEDICARE

## 2019-03-13 VITALS
SYSTOLIC BLOOD PRESSURE: 132 MMHG | WEIGHT: 293 LBS | BODY MASS INDEX: 45.99 KG/M2 | TEMPERATURE: 97 F | DIASTOLIC BLOOD PRESSURE: 69 MMHG | HEIGHT: 67 IN | HEART RATE: 83 BPM

## 2019-03-13 DIAGNOSIS — I87.2 VENOUS STASIS DERMATITIS OF BOTH LOWER EXTREMITIES: Chronic | ICD-10-CM

## 2019-03-13 DIAGNOSIS — L97.521 ULCER OF TOE OF LEFT FOOT, LIMITED TO BREAKDOWN OF SKIN: ICD-10-CM

## 2019-03-13 DIAGNOSIS — L97.412 ULCER OF RIGHT HEEL AND MIDFOOT WITH FAT LAYER EXPOSED: ICD-10-CM

## 2019-03-13 DIAGNOSIS — B35.3 TINEA PEDIS OF BOTH FEET: Chronic | ICD-10-CM

## 2019-03-13 DIAGNOSIS — I87.2 VENOUS INSUFFICIENCY OF BOTH LOWER EXTREMITIES: Chronic | ICD-10-CM

## 2019-03-13 DIAGNOSIS — L97.922 ULCER OF LOWER LIMB, LEFT, WITH FAT LAYER EXPOSED: ICD-10-CM

## 2019-03-13 DIAGNOSIS — R60.0 BILATERAL LEG EDEMA: ICD-10-CM

## 2019-03-13 DIAGNOSIS — L97.512 SKIN ULCER OF TOE OF RIGHT FOOT WITH FAT LAYER EXPOSED: ICD-10-CM

## 2019-03-13 DIAGNOSIS — L97.911 ULCER OF RIGHT LOWER EXTREMITY, LIMITED TO BREAKDOWN OF SKIN: ICD-10-CM

## 2019-03-13 DIAGNOSIS — L97.421 ULCER OF LEFT HEEL AND MIDFOOT, LIMITED TO BREAKDOWN OF SKIN: Primary | ICD-10-CM

## 2019-03-13 PROCEDURE — 29580 STRAPPING UNNA BOOT: CPT | Mod: 50,HCNC,S$GLB, | Performed by: NURSE PRACTITIONER

## 2019-03-13 PROCEDURE — 99999 PR PBB SHADOW E&M-EST. PATIENT-LVL V: ICD-10-PCS | Mod: PBBFAC,HCNC,, | Performed by: NURSE PRACTITIONER

## 2019-03-13 PROCEDURE — 29580 PR STRAPPING UNNA BOOT: ICD-10-PCS | Mod: 50,HCNC,S$GLB, | Performed by: NURSE PRACTITIONER

## 2019-03-13 PROCEDURE — 99999 PR PBB SHADOW E&M-EST. PATIENT-LVL V: CPT | Mod: PBBFAC,HCNC,, | Performed by: NURSE PRACTITIONER

## 2019-03-13 PROCEDURE — 99499 NO LOS: ICD-10-PCS | Mod: HCNC,S$GLB,, | Performed by: NURSE PRACTITIONER

## 2019-03-13 PROCEDURE — 99499 UNLISTED E&M SERVICE: CPT | Mod: HCNC,S$GLB,, | Performed by: NURSE PRACTITIONER

## 2019-03-13 NOTE — PROGRESS NOTES
Subjective:       Patient ID: Sherin Ortiz is a 74 y.o. female.    Chief Complaint: Wound Check    Wound Check     Wound Check:   This patient is seen today for reevaluation of recurrent ulcers to both feet and lower legs.  She has home health services through [facility]. Unna boots are being applied to both lower legs.  However, she removed them today to shower.  She has ace wraps on her legs today. She has more wounds on today's visit than she did on her last clinic visit. Problems that interfere with wound healing include multiple co-morbidities, diabetes, edema, diabetic neuropathy and  morbid obesity. Because of safety issues we are unable to weigh the patient as she is unstable on her feet.  The patient is afebrile. The patient ambulates with great difficulty secondary to her body habitus and uses a motorized wheelchair which we cannot get on the scale to weigh her.  She was asked to come to her next visit in a standard wheelchair. Her pain level is 5/10.  She denies increased swelling, redness or purulent drainage.     Review of Systems    Unchanged from previous visit.  Objective:      Physical Exam   Constitutional: She is oriented to person, place, and time. No distress.   Morbidly obese   HENT:   Head: Normocephalic and atraumatic.   Neck: Normal range of motion. Neck supple.   Pulmonary/Chest: Effort normal.   Musculoskeletal: Normal range of motion. She exhibits edema. She exhibits no tenderness.        Legs:       Feet:    Neurological: She is alert and oriented to person, place, and time.   Skin: Skin is warm and dry. Rash (dermatitis and tinea bilateral lower extremities) noted. She is not diaphoretic. No cyanosis or erythema. No pallor. Nails show no clubbing.   Psychiatric: She has a normal mood and affect. Her behavior is normal. Judgment and thought content normal.   Nursing note and vitals reviewed.    ..  Hemoglobin A1C   Date Value Ref Range Status   08/13/2018 10.6 (H) 4.0 - 5.6 % Final      Comment:     ADA Screening Guidelines:  5.7-6.4%  Consistent with prediabetes  >or=6.5%  Consistent with diabetes  High levels of fetal hemoglobin interfere with the HbA1C  assay. Heterozygous hemoglobin variants (HbS, HgC, etc)do  not significantly interfere with this assay.   However, presence of multiple variants may affect accuracy.     10/09/2017 9.9 (H) 4.0 - 5.6 % Final     Comment:     According to ADA guidelines, hemoglobin A1c <7.0% represents  optimal control in non-pregnant diabetic patients. Different  metrics may apply to specific patient populations.   Standards of Medical Care in Diabetes-2016.  For the purpose of screening for the presence of diabetes:  <5.7%     Consistent with the absence of diabetes  5.7-6.4%  Consistent with increasing risk for diabetes   (prediabetes)  >or=6.5%  Consistent with diabetes  Currently, no consensus exists for use of hemoglobin A1c  for diagnosis of diabetes for children.  This Hemoglobin A1c assay has significant interference with fetal   hemoglobin   (HbF). The results are invalid for patients with abnormal amounts of   HbF,   including those with known Hereditary Persistence   of Fetal Hemoglobin. Heterozygous hemoglobin variants (HbAS, HbAC,   HbAD, HbAE, HbA2) do not significantly interfere with this assay;   however, presence of multiple variants in a sample may impact the %   interference.     07/06/2017 10.0 (H) 4.0 - 5.6 % Final     Comment:     According to ADA guidelines, hemoglobin A1c <7.0% represents  optimal control in non-pregnant diabetic patients. Different  metrics may apply to specific patient populations.   Standards of Medical Care in Diabetes-2016.  For the purpose of screening for the presence of diabetes:  <5.7%     Consistent with the absence of diabetes  5.7-6.4%  Consistent with increasing risk for diabetes   (prediabetes)  >or=6.5%  Consistent with diabetes  Currently, no consensus exists for use of hemoglobin A1c  for diagnosis of  diabetes for children.  This Hemoglobin A1c assay has significant interference with fetal   hemoglobin   (HbF). The results are invalid for patients with abnormal amounts of   HbF,   including those with known Hereditary Persistence   of Fetal Hemoglobin. Heterozygous hemoglobin variants (HbAS, HbAC,   HbAD, HbAE, HbA2) do not significantly interfere with this assay;   however, presence of multiple variants in a sample may impact the %   interference.       ..  Lab Results   Component Value Date    ALBUMIN 3.3 (L) 08/13/2018     Sherin was seen in the clinic room and placed in the supine position on the treatment table.  Both legs were cleansed with Easi-clense sponges and dried thoroughly. Medihoney gel and a hydrofiber dressing was applied to the leg wounds.  Eucerin cream was applied to the lower legs.  The patient's feet were positioned at a 90 degree angle.  A zinc oxide wrap, followed by kerlix roll gauze and coban were applied using a spiral technique avoiding creases or folds.  The wraps were started behind the first metatarsal and ended below the tibial tubercle of the knee.  There was overlap of each turn half the width of the previous turn.  The compression wraps will be changed every 2-3 days.    Assessment:       1. Ulcer of left heel and midfoot, limited to breakdown of skin    2. Ulcer of right lower extremity, limited to breakdown of skin    3. Ulcer of lower limb, left, with fat layer exposed    4. Ulcer of right heel and midfoot with fat layer exposed    5. Ulcer of toe of left foot, limited to breakdown of skin    6. Skin ulcer of toe of right foot with fat layer exposed    7. Venous insufficiency of both lower extremities    8. Tinea pedis of both feet    9. Venous stasis dermatitis of both lower extremities    10. Bilateral leg edema        Plan:           Unna boots bilateral lower legs as detailed above.  Apply medihoney gel  to bilateral toe and foot ulcers, cover with left foot ulcer with  hydrofiber, and cover right foot wounds with silver hydrofiber..  Place cotton in between toes, cover with cotton gauze and secure with roll gauze.  Change toe and distal foot wounds daily.  Patient was warned not to get the dressings wet and to use cast covers for showering.  Should the dressing become wet, she is to remove it, place a wet-to-dry dressing over the wound, cover with gauze and roll gauze and use ace wraps for compression and to secure bandages.  She should then notify home health as soon as possible to have a new dressing applied.  Return to clinic in 4 weeks.  [facility] notified of orders via email. We will ask them to continue to see the patient three times weekly.    Home Health Orders:    Triamcinolone cream to bilateral lower legs.  Ketoconazole cream to bot feet.  Cleanse wounds with wound cleanser.  Pad ankles with ABD pads.  Medihoney gel to bilateral leg ulcers and cover with hydrofiber.  Unna boot (zinc oxide with calamine, kerlix and coban) bilateral lower legs.  Apply medihoney gel to right dorsal foot ulcer and right great toe ulcer and cover with silver alginate.  Apply medihoney gel to left foot and toe ulcer and cover with hydrofiber.  Cotton in between the toes both feet, cover with gauze and secure with roll gauze.  Change compression dressings three times weekly.  Change right toe and foot wounds daily.    Skilled nurse visit-3 x weekly              Left medial leg      Left posterior leg      Left dorsal foot      Left medial heel      Right anterior leg      Right dorsal foot      Right great toe      Right medial  heel

## 2019-03-21 ENCOUNTER — PATIENT OUTREACH (OUTPATIENT)
Dept: ADMINISTRATIVE | Facility: HOSPITAL | Age: 74
End: 2019-03-21

## 2019-03-21 DIAGNOSIS — Z13.820 OSTEOPOROSIS SCREENING: Primary | ICD-10-CM

## 2019-03-21 DIAGNOSIS — Z78.0 ASYMPTOMATIC MENOPAUSAL STATE: ICD-10-CM

## 2019-03-21 NOTE — PROGRESS NOTES
VM reminder left of PCP visit on 3-25-19 , instructed to call our office to schedule her Dexa Scan and Diabetic Eye Exam.

## 2019-03-22 ENCOUNTER — HOSPITAL ENCOUNTER (INPATIENT)
Facility: HOSPITAL | Age: 74
LOS: 6 days | Discharge: HOME HEALTH | DRG: 603 | End: 2019-03-28
Attending: EMERGENCY MEDICINE | Admitting: HOSPITALIST
Payer: MEDICARE

## 2019-03-22 ENCOUNTER — TELEPHONE (OUTPATIENT)
Dept: WOUND CARE | Facility: CLINIC | Age: 74
End: 2019-03-22

## 2019-03-22 DIAGNOSIS — I50.30 (HFPEF) HEART FAILURE WITH PRESERVED EJECTION FRACTION: Primary | ICD-10-CM

## 2019-03-22 DIAGNOSIS — K80.50 CHOLEDOCHOLITHIASIS: ICD-10-CM

## 2019-03-22 DIAGNOSIS — L03.90 CELLULITIS: ICD-10-CM

## 2019-03-22 PROBLEM — N17.9 ACUTE RENAL FAILURE SUPERIMPOSED ON STAGE 3 CHRONIC KIDNEY DISEASE: Status: ACTIVE | Noted: 2019-03-22

## 2019-03-22 PROBLEM — D72.829 LEUKOCYTOSIS: Status: ACTIVE | Noted: 2019-03-22

## 2019-03-22 PROBLEM — E80.6 HYPERBILIRUBINEMIA: Status: ACTIVE | Noted: 2019-03-22

## 2019-03-22 PROBLEM — R73.9 HYPERGLYCEMIA: Status: ACTIVE | Noted: 2019-03-22

## 2019-03-22 PROBLEM — R94.31 PROLONGED Q-T INTERVAL ON ECG: Status: ACTIVE | Noted: 2019-03-22

## 2019-03-22 PROBLEM — N18.30 ACUTE RENAL FAILURE SUPERIMPOSED ON STAGE 3 CHRONIC KIDNEY DISEASE: Status: ACTIVE | Noted: 2019-03-22

## 2019-03-22 LAB
ALBUMIN SERPL BCP-MCNC: 2.3 G/DL
ALP SERPL-CCNC: 156 U/L
ALT SERPL W/O P-5'-P-CCNC: 9 U/L
ANION GAP SERPL CALC-SCNC: 11 MMOL/L
AST SERPL-CCNC: 13 U/L
BACTERIA #/AREA URNS AUTO: ABNORMAL /HPF
BASOPHILS # BLD AUTO: 0.02 K/UL
BASOPHILS NFR BLD: 0.1 %
BILIRUB SERPL-MCNC: 2.7 MG/DL
BILIRUB UR QL STRIP: NEGATIVE
BUN SERPL-MCNC: 27 MG/DL
CALCIUM SERPL-MCNC: 9.5 MG/DL
CHLORIDE SERPL-SCNC: 93 MMOL/L
CLARITY UR REFRACT.AUTO: ABNORMAL
CO2 SERPL-SCNC: 25 MMOL/L
COLOR UR AUTO: YELLOW
CREAT SERPL-MCNC: 1.9 MG/DL
CRP SERPL-MCNC: 216.2 MG/L
DIFFERENTIAL METHOD: ABNORMAL
EOSINOPHIL # BLD AUTO: 0 K/UL
EOSINOPHIL NFR BLD: 0 %
ERYTHROCYTE [DISTWIDTH] IN BLOOD BY AUTOMATED COUNT: 13.2 %
ERYTHROCYTE [SEDIMENTATION RATE] IN BLOOD BY WESTERGREN METHOD: 90 MM/HR
EST. GFR  (AFRICAN AMERICAN): 29.1 ML/MIN/1.73 M^2
EST. GFR  (NON AFRICAN AMERICAN): 25.2 ML/MIN/1.73 M^2
ESTIMATED AVG GLUCOSE: 235 MG/DL
GLUCOSE SERPL-MCNC: 295 MG/DL
GLUCOSE UR QL STRIP: ABNORMAL
HBA1C MFR BLD HPLC: 9.8 %
HCT VFR BLD AUTO: 33 %
HGB BLD-MCNC: 11.1 G/DL
HGB UR QL STRIP: ABNORMAL
IMM GRANULOCYTES # BLD AUTO: 0.14 K/UL
IMM GRANULOCYTES NFR BLD AUTO: 1 %
INR PPP: 1.1
KETONES UR QL STRIP: NEGATIVE
LACTATE SERPL-SCNC: 1.3 MMOL/L
LEUKOCYTE ESTERASE UR QL STRIP: ABNORMAL
LYMPHOCYTES # BLD AUTO: 0.9 K/UL
LYMPHOCYTES NFR BLD: 6.4 %
MCH RBC QN AUTO: 29.5 PG
MCHC RBC AUTO-ENTMCNC: 33.6 G/DL
MCV RBC AUTO: 88 FL
MICROSCOPIC COMMENT: ABNORMAL
MONOCYTES # BLD AUTO: 0.9 K/UL
MONOCYTES NFR BLD: 6.6 %
NEUTROPHILS # BLD AUTO: 11.7 K/UL
NEUTROPHILS NFR BLD: 85.9 %
NITRITE UR QL STRIP: NEGATIVE
NRBC BLD-RTO: 0 /100 WBC
PH UR STRIP: 5 [PH] (ref 5–8)
PLATELET # BLD AUTO: 269 K/UL
PMV BLD AUTO: 8.7 FL
POTASSIUM SERPL-SCNC: 4.5 MMOL/L
PROT SERPL-MCNC: 7.6 G/DL
PROT UR QL STRIP: NEGATIVE
PROTHROMBIN TIME: 11.3 SEC
RBC # BLD AUTO: 3.76 M/UL
RBC #/AREA URNS AUTO: 15 /HPF (ref 0–4)
SODIUM SERPL-SCNC: 129 MMOL/L
SP GR UR STRIP: 1.01 (ref 1–1.03)
URN SPEC COLLECT METH UR: ABNORMAL
WBC # BLD AUTO: 13.66 K/UL
WBC #/AREA URNS AUTO: 47 /HPF (ref 0–5)
YEAST UR QL AUTO: ABNORMAL

## 2019-03-22 PROCEDURE — 85025 COMPLETE CBC W/AUTO DIFF WBC: CPT | Mod: HCNC

## 2019-03-22 PROCEDURE — 96372 THER/PROPH/DIAG INJ SC/IM: CPT | Mod: 59,HCNC

## 2019-03-22 PROCEDURE — 96366 THER/PROPH/DIAG IV INF ADDON: CPT | Mod: HCNC

## 2019-03-22 PROCEDURE — S5571 INSULIN DISPOS PEN 3 ML: HCPCS | Mod: HCNC | Performed by: STUDENT IN AN ORGANIZED HEALTH CARE EDUCATION/TRAINING PROGRAM

## 2019-03-22 PROCEDURE — 87186 SC STD MICRODIL/AGAR DIL: CPT | Mod: 59,HCNC

## 2019-03-22 PROCEDURE — 25000003 PHARM REV CODE 250: Mod: HCNC | Performed by: STUDENT IN AN ORGANIZED HEALTH CARE EDUCATION/TRAINING PROGRAM

## 2019-03-22 PROCEDURE — 85610 PROTHROMBIN TIME: CPT | Mod: HCNC

## 2019-03-22 PROCEDURE — 25000003 PHARM REV CODE 250: Mod: HCNC | Performed by: EMERGENCY MEDICINE

## 2019-03-22 PROCEDURE — 83036 HEMOGLOBIN GLYCOSYLATED A1C: CPT | Mod: HCNC

## 2019-03-22 PROCEDURE — 99285 PR EMERGENCY DEPT VISIT,LEVEL V: ICD-10-PCS | Mod: HCNC,,, | Performed by: EMERGENCY MEDICINE

## 2019-03-22 PROCEDURE — 87070 CULTURE OTHR SPECIMN AEROBIC: CPT | Mod: 59,HCNC

## 2019-03-22 PROCEDURE — 85652 RBC SED RATE AUTOMATED: CPT | Mod: HCNC

## 2019-03-22 PROCEDURE — 86140 C-REACTIVE PROTEIN: CPT | Mod: HCNC

## 2019-03-22 PROCEDURE — 87086 URINE CULTURE/COLONY COUNT: CPT | Mod: HCNC

## 2019-03-22 PROCEDURE — 12000002 HC ACUTE/MED SURGE SEMI-PRIVATE ROOM: Mod: HCNC

## 2019-03-22 PROCEDURE — 83605 ASSAY OF LACTIC ACID: CPT | Mod: HCNC

## 2019-03-22 PROCEDURE — 96361 HYDRATE IV INFUSION ADD-ON: CPT | Mod: HCNC

## 2019-03-22 PROCEDURE — 81001 URINALYSIS AUTO W/SCOPE: CPT | Mod: HCNC

## 2019-03-22 PROCEDURE — 96367 TX/PROPH/DG ADDL SEQ IV INF: CPT | Mod: HCNC

## 2019-03-22 PROCEDURE — 87077 CULTURE AEROBIC IDENTIFY: CPT | Mod: 59,HCNC

## 2019-03-22 PROCEDURE — 99285 EMERGENCY DEPT VISIT HI MDM: CPT | Mod: HCNC,,, | Performed by: EMERGENCY MEDICINE

## 2019-03-22 PROCEDURE — 96365 THER/PROPH/DIAG IV INF INIT: CPT | Mod: HCNC

## 2019-03-22 PROCEDURE — 63600175 PHARM REV CODE 636 W HCPCS: Mod: HCNC | Performed by: HOSPITALIST

## 2019-03-22 PROCEDURE — 82570 ASSAY OF URINE CREATININE: CPT | Mod: HCNC

## 2019-03-22 PROCEDURE — 25000003 PHARM REV CODE 250: Mod: HCNC | Performed by: PHYSICIAN ASSISTANT

## 2019-03-22 PROCEDURE — 99285 EMERGENCY DEPT VISIT HI MDM: CPT | Mod: 25,HCNC

## 2019-03-22 PROCEDURE — 87040 BLOOD CULTURE FOR BACTERIA: CPT | Mod: 59,HCNC

## 2019-03-22 PROCEDURE — 63600175 PHARM REV CODE 636 W HCPCS: Mod: HCNC | Performed by: EMERGENCY MEDICINE

## 2019-03-22 PROCEDURE — 80053 COMPREHEN METABOLIC PANEL: CPT | Mod: HCNC

## 2019-03-22 PROCEDURE — 84540 ASSAY OF URINE/UREA-N: CPT | Mod: HCNC

## 2019-03-22 PROCEDURE — 63600175 PHARM REV CODE 636 W HCPCS: Mod: HCNC | Performed by: STUDENT IN AN ORGANIZED HEALTH CARE EDUCATION/TRAINING PROGRAM

## 2019-03-22 RX ORDER — MEROPENEM AND SODIUM CHLORIDE 1 G/50ML
1 INJECTION, SOLUTION INTRAVENOUS
Status: DISCONTINUED | OUTPATIENT
Start: 2019-03-22 | End: 2019-03-23

## 2019-03-22 RX ORDER — HEPARIN SODIUM 5000 [USP'U]/ML
5000 INJECTION, SOLUTION INTRAVENOUS; SUBCUTANEOUS EVERY 8 HOURS
Status: DISCONTINUED | OUTPATIENT
Start: 2019-03-22 | End: 2019-03-28 | Stop reason: HOSPADM

## 2019-03-22 RX ORDER — IBUPROFEN 200 MG
TABLET ORAL
Status: ON HOLD | COMMUNITY
Start: 2019-02-13 | End: 2019-03-26 | Stop reason: HOSPADM

## 2019-03-22 RX ORDER — ONDANSETRON 8 MG/1
8 TABLET, ORALLY DISINTEGRATING ORAL
Status: DISPENSED | OUTPATIENT
Start: 2019-03-22 | End: 2019-03-23

## 2019-03-22 RX ORDER — VANCOMYCIN 2 GRAM/500 ML IN 0.9 % SODIUM CHLORIDE INTRAVENOUS
2000
Status: COMPLETED | OUTPATIENT
Start: 2019-03-22 | End: 2019-03-22

## 2019-03-22 RX ORDER — ACETAMINOPHEN 325 MG/1
650 TABLET ORAL EVERY 8 HOURS PRN
Status: DISCONTINUED | OUTPATIENT
Start: 2019-03-22 | End: 2019-03-28 | Stop reason: HOSPADM

## 2019-03-22 RX ORDER — VANCOMYCIN 2 GRAM/500 ML IN 0.9 % SODIUM CHLORIDE INTRAVENOUS
2000
Status: DISCONTINUED | OUTPATIENT
Start: 2019-03-23 | End: 2019-03-23

## 2019-03-22 RX ORDER — RAMELTEON 8 MG/1
8 TABLET ORAL NIGHTLY PRN
Status: DISCONTINUED | OUTPATIENT
Start: 2019-03-22 | End: 2019-03-28 | Stop reason: HOSPADM

## 2019-03-22 RX ORDER — CEFEPIME HYDROCHLORIDE 1 G/1
1 INJECTION, POWDER, FOR SOLUTION INTRAMUSCULAR; INTRAVENOUS
Status: DISCONTINUED | OUTPATIENT
Start: 2019-03-22 | End: 2019-03-22

## 2019-03-22 RX ORDER — NAPROXEN SODIUM 220 MG/1
81 TABLET, FILM COATED ORAL DAILY
Status: DISCONTINUED | OUTPATIENT
Start: 2019-03-23 | End: 2019-03-28 | Stop reason: HOSPADM

## 2019-03-22 RX ORDER — SODIUM CHLORIDE 0.9 % (FLUSH) 0.9 %
5 SYRINGE (ML) INJECTION
Status: CANCELLED | OUTPATIENT
Start: 2019-03-22

## 2019-03-22 RX ORDER — CLOPIDOGREL BISULFATE 75 MG/1
75 TABLET ORAL DAILY
Status: DISCONTINUED | OUTPATIENT
Start: 2019-03-23 | End: 2019-03-28 | Stop reason: HOSPADM

## 2019-03-22 RX ORDER — IBUPROFEN 200 MG
24 TABLET ORAL
Status: DISCONTINUED | OUTPATIENT
Start: 2019-03-22 | End: 2019-03-28 | Stop reason: HOSPADM

## 2019-03-22 RX ORDER — ATORVASTATIN CALCIUM 20 MG/1
40 TABLET, FILM COATED ORAL NIGHTLY
Status: DISCONTINUED | OUTPATIENT
Start: 2019-03-22 | End: 2019-03-28 | Stop reason: HOSPADM

## 2019-03-22 RX ORDER — IBUPROFEN 200 MG
16 TABLET ORAL
Status: DISCONTINUED | OUTPATIENT
Start: 2019-03-22 | End: 2019-03-28 | Stop reason: HOSPADM

## 2019-03-22 RX ORDER — GLUCAGON 1 MG
1 KIT INJECTION
Status: DISCONTINUED | OUTPATIENT
Start: 2019-03-22 | End: 2019-03-28 | Stop reason: HOSPADM

## 2019-03-22 RX ORDER — DOXYLAMINE SUCCINATE 25 MG
TABLET ORAL 2 TIMES DAILY
Status: DISCONTINUED | OUTPATIENT
Start: 2019-03-22 | End: 2019-03-28 | Stop reason: HOSPADM

## 2019-03-22 RX ORDER — BUMETANIDE 1 MG/1
2 TABLET ORAL DAILY
Status: DISCONTINUED | OUTPATIENT
Start: 2019-03-23 | End: 2019-03-23

## 2019-03-22 RX ORDER — ACETAMINOPHEN 325 MG/1
650 TABLET ORAL EVERY 4 HOURS PRN
Status: DISCONTINUED | OUTPATIENT
Start: 2019-03-22 | End: 2019-03-28 | Stop reason: HOSPADM

## 2019-03-22 RX ORDER — INSULIN ASPART 100 [IU]/ML
1-10 INJECTION, SOLUTION INTRAVENOUS; SUBCUTANEOUS
Status: DISCONTINUED | OUTPATIENT
Start: 2019-03-22 | End: 2019-03-24

## 2019-03-22 RX ORDER — DIPHENHYDRAMINE HCL 25 MG
25 CAPSULE ORAL EVERY 6 HOURS PRN
Status: DISCONTINUED | OUTPATIENT
Start: 2019-03-22 | End: 2019-03-28 | Stop reason: HOSPADM

## 2019-03-22 RX ORDER — SODIUM CHLORIDE 0.9 % (FLUSH) 0.9 %
5 SYRINGE (ML) INJECTION
Status: DISCONTINUED | OUTPATIENT
Start: 2019-03-22 | End: 2019-03-28 | Stop reason: HOSPADM

## 2019-03-22 RX ADMIN — ATORVASTATIN CALCIUM 40 MG: 10 TABLET, FILM COATED ORAL at 10:03

## 2019-03-22 RX ADMIN — MEROPENEM AND SODIUM CHLORIDE 1 G: 1 INJECTION, SOLUTION INTRAVENOUS at 10:03

## 2019-03-22 RX ADMIN — VANCOMYCIN HYDROCHLORIDE 2000 MG: 100 INJECTION, POWDER, LYOPHILIZED, FOR SOLUTION INTRAVENOUS at 08:03

## 2019-03-22 RX ADMIN — SODIUM CHLORIDE 500 ML: 0.9 INJECTION, SOLUTION INTRAVENOUS at 08:03

## 2019-03-22 RX ADMIN — HEPARIN SODIUM 5000 UNITS: 5000 INJECTION, SOLUTION INTRAVENOUS; SUBCUTANEOUS at 11:03

## 2019-03-22 RX ADMIN — INSULIN DETEMIR 15 UNITS: 100 INJECTION, SOLUTION SUBCUTANEOUS at 10:03

## 2019-03-22 NOTE — ED TRIAGE NOTES
Sherin Ortiz, a 74 y.o. 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    Benign essential hypertension 11/14/2012    Cataract     s/p phacoemulsification    Chronic kidney disease (CKD), stage III (moderate) 9/12/2013    Diabetic peripheral neuropathy associated with type 2 diabetes mellitus 11/14/2014    causing right hemiparesis    Gait disorder     Hyperlipidemia     Iritis - Both Eyes 6/10/2013    Kidney stone     Lymphedema     Morbid obesity with BMI of 40.0-44.9, adult 2/18/2015    Nephrolithiasis 4/20/2016    NS (nuclear sclerosis) 4/1/2013    Nuclear sclerosis - Both Eyes 4/29/2013    Preseptal cellulitis - Right Eye 4/29/2013    Proliferative diabetic retinopathy - Both Eyes 4/29/2013    Proliferative diabetic retinopathy, both eyes 4/1/2013    PSC (posterior subcapsular cataract) - Both Eyes 4/29/2013    S/P hernia repair 12/19/2012    TIA (transient ischemic attack) 11/18/2014    Tinea pedis 7/24/2012    Tinea pedis is present on both feet.    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    Family is present     Pain: 9/10 L heel when applying pressure     LOC: The patient is awake, alert, and oriented to person, place, time, situation. Aware of environment with an appropriate affect. Speech is appropriate and clear.      APPEARANCE: Patient appears clean, well groomed, with clothing appropriate to environment.    Psychosocial:  Patient is calm and cooperative.  Patients insight and judgement are appropriate to situation. No evidence of delusions, hallucinations, or psychosis.     SKIN: The skin is clean, warm, dry, and color consistent with ethnicity. Patient has poor skin turgor and moist mucus membranes. Multiple open areas of skin breakdown, edema, erythema to BLE and B feet. Capillary refill < 3 seconds.      MUSCULOSKELETAL: Patient moving upper and lower extremities with difficulty, obvious swelling, weakness and deformities observed. Patient motorized wheelchair bound, but reports able to transfer independently     RESPIRATORY: Airway is open. Respirations spontaneous, even, and non-labored, with normal effort and rate. No accessory muscle use observed. Denies cough.       CARDIAC:  No complaints of chest pain. Patient has a normal rate and rhythm, no periphreal edema observed, peripheral pulses 2+, capillary refill < 3 seconds.    ABDOMEN: Soft, non-tender and no distention noted.      NEUROLOGIC: Eyes open spontaneously.  Behavior appropriate to situation.  Follows commands; facial expression symmetrical.  Purposeful motor response noted; normal sensation in all extremities when touched with a finger.      URINARY:  Patient reports routine urination without pain, frequency, or urgency. Patient reports stress incontinence

## 2019-03-22 NOTE — ED PROVIDER NOTES
Encounter Date: 3/22/2019       History     Chief Complaint   Patient presents with    Cellulitis     arrived via EMS from home with c/o cellulitis to bilateral legs     Patient is a 74-yo white female with a PMHx of CKDIII, T2DM with diabetic peripheral neuropathy, lymphedema, and chronic lower extremity ulcers who presents to the ED for evaluation of cellulitis. Patient reports onset of left lower extremity pain especially at the heel starting yesterday with associated vomiting and 1 episode of emesis. She states pain is made worse with bearing weight on the left foot. She was advised to present to the ED by her home health nurse, who noted two new wounds on the right and left leg. Her chronic wounds are managed by wound care and home health, who change wound dressings 3x/week. She denies fever/chills, chest pain, SOB, abdominal pain, dysuria, or new numbness or weakness of the lower extremities. She denies recent medication changes.        The history is provided by the patient.     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    Benign essential hypertension 11/14/2012    Cataract     s/p phacoemulsification    Chronic kidney disease (CKD), stage III (moderate) 9/12/2013    Diabetic peripheral neuropathy associated with type 2 diabetes mellitus 11/14/2014    causing right hemiparesis    Gait disorder     Hyperlipidemia     Iritis - Both Eyes 6/10/2013    Kidney stone     Lymphedema     Morbid obesity with BMI of 40.0-44.9, adult 2/18/2015    Nephrolithiasis 4/20/2016    NS (nuclear sclerosis) 4/1/2013    Nuclear sclerosis - Both Eyes 4/29/2013    Preseptal cellulitis - Right Eye  4/29/2013    Proliferative diabetic retinopathy - Both Eyes 4/29/2013    Proliferative diabetic retinopathy, both eyes 4/1/2013    PSC (posterior subcapsular cataract) - Both Eyes 4/29/2013    S/P hernia repair 12/19/2012    TIA (transient ischemic attack) 11/18/2014    Tinea pedis 7/24/2012    Tinea pedis is present on both feet.     Type 2 diabetes mellitus with renal manifestations, controlled 12/12/2013    Type 2 diabetes, controlled, with moderate nonproliferative diabetic retinopathy without macular edema 9/17/2015    Ulcer of left lower extremity, limited to breakdown of skin 7/8/2015    Unspecified cerebral artery occlusion with cerebral infarction 11/16/2014    Unspecified venous (peripheral) insufficiency     Ureteral stone with hydronephrosis 1/27/2016    UTI (lower urinary tract infection)     Vaginal infection     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     Past Surgical History:   Procedure Laterality Date    APPENDECTOMY      CATARACT EXTRACTION W/  INTRAOCULAR LENS IMPLANT Left 5/21/2013    CATARACT EXTRACTION W/  INTRAOCULAR LENS IMPLANT Right 6/4/2013    CHOLECYSTECTOMY      COLONOSCOPY  12/22/2005    normal    CYSTOSCOPY  4/20/2016    [facility]    CYSTOSCOPY WITH STENT PLACEMENT Right 1/27/2016    [facility]    ESOPHAGOGASTRODUODENOSCOPY  12/21/2015    hiatal hernia, Schatzki ring    ESOPHAGOGASTRODUODENOSCOPY (EGD) N/A 6/30/2014    Performed by [facility]reet Ng [facility] at [facility] ENDO (2ND FLR)    EYE SURGERY Bilateral 2008    laser surgery both eyes    INSERTION, IOL PROSTHESIS Right 6/4/2013    Per[facility] by [facility] Schwartz [facility] at [facility] OR 1ST FLR    INSERTION, IOL PROSTHESIS Left 5/21/2013    [facility] at [facility] OR 1ST FLR    NASAL SEPTUM SURGERY      PHACOEMULSIFICATION, CATARACT Right 6/4/2013    [facility] at [facility]     PHACOEMULSIFICATION, CATARACT Left 2013    [facility]ST FLR    PYELOGRAM-RETROGRADE Right 2016    [facility] OR    REMOVAL-STENT-URETERAL Right 2016    [facility] OR    SUBTOTAL COLECTOMY  2012    transverse colon, for incarcerated umbilical hernia, Dr. Kat Trujillo-Katie    URETEROSCOPY Right 2016    [facility] at [facility] OR     Family History   Problem Relation Age of Onset    Diabetes Sister     Cataracts Sister     Heart disease Brother     Cataracts Brother     Leukemia Mother     Cancer Neg Hx     Amblyopia Neg Hx     Blindness Neg Hx     Glaucoma Neg Hx     Hypertension Neg Hx     Macular degeneration Neg Hx     Retinal detachment Neg Hx     Strabismus Neg Hx     Stroke Neg Hx     Thyroid disease Neg Hx     Kidney disease Neg Hx      Social History     Tobacco Use    Smoking status: Former Smoker     Packs/day: 0.50     Years: 15.00     Pack years: 7.50     Types: Cigarettes     Last attempt to quit: 1982     Years since quittin.7    Smokeless tobacco: Former User    Tobacco comment: smoked one pack per week   Substance Use Topics    Alcohol use: No    Drug use: No     Review of Systems   Constitutional: Negative for chills and fever.   HENT: Negative for sore throat.    Eyes: Negative for visual disturbance.   Respiratory: Negative for cough and shortness of breath.    Cardiovascular: Negative for chest pain.   Gastrointestinal: Positive for nausea and vomiting. Negative for abdominal pain, constipation and diarrhea.   Genitourinary: Negative for dysuria.   Musculoskeletal: Negative for myalgias.   Skin: Negative for wound.   Neurological: Negative for weakness, numbness and headaches.   Psychiatric/Behavioral: Negative for dysphoric mood.       Physical Exam     Initial Vitals [19 1559]   BP Pulse Resp Temp SpO2    128/72 92 20 98.7 °F (37.1 °C) 99 %      MAP       --         Physical Exam    Nursing note and vitals reviewed.  Constitutional: She appears well-developed and well-nourished. She is not diaphoretic. She is Obese . No distress.   HENT:   Head: Normocephalic and atraumatic.   Mouth/Throat: Oropharynx is clear and moist. No oropharyngeal exudate.   Eyes: Conjunctivae and EOM are normal. Pupils are equal, round, and reactive to light.   Neck: Normal range of motion. Neck supple.   Cardiovascular: Normal rate, regular rhythm, normal heart sounds and intact distal pulses.   DP pulses 2+ bilaterally.   Pulmonary/Chest: Breath sounds normal. No respiratory distress. She has no wheezes. She has no rhonchi. She has no rales.   Abdominal: Soft. Bowel sounds are normal. There is no tenderness.   Musculoskeletal: She exhibits edema and tenderness.   Limited LE ROM 2/2 severe edema (at baseline, wheelchair bound).   Neurological: She is alert and oriented to person, place, and time.   Skin: Skin is warm and dry. There is erythema.        Tenderness, erythema, warmth, and boggy swelling noted to the left medial ankle which appears new when compared to pictures from 3/13.     Multiple, well-demarcated ulcers noted to bilateral legs that appear stable. See pictures below.   Psychiatric: She has a normal mood and affect. Thought content normal.         ED Course   Procedures  Labs Reviewed   CBC W/ AUTO DIFFERENTIAL - Abnormal; Notable for the following components:       Result Value    WBC 13.66 (*)     RBC 3.76 (*)     Hemoglobin 11.1 (*)     Hematocrit 33.0 (*)     MPV 8.7 (*)     Immature Granulocytes 1.0 (*)     Gran # (ANC) 11.7 (*)     Immature Grans (Abs) 0.14 (*)     Lymph # 0.9 (*)     Gran% 85.9 (*)     Lymph% 6.4 (*)     All other components within normal limits   COMPREHENSIVE METABOLIC PANEL - Abnormal; Notable for the following components:    Sodium 129 (*)     Chloride 93 (*)     Glucose 295 (*)     BUN, Bld 27  (*)     Creatinine 1.9 (*)     Albumin 2.3 (*)     Total Bilirubin 2.7 (*)     Alkaline Phosphatase 156 (*)     ALT 9 (*)     eGFR if  29.1 (*)     eGFR if non  25.2 (*)     All other components within normal limits   URINALYSIS, REFLEX TO URINE CULTURE - Abnormal; Notable for the following components:    Appearance, UA Cloudy (*)     Glucose, UA 3+ (*)     Occult Blood UA 1+ (*)     Leukocytes, UA 3+ (*)     All other components within normal limits    Narrative:     Preferred Collection Type->Urine, Clean Catch   HEMOGLOBIN A1C - Abnormal; Notable for the following components:    Hemoglobin A1C 9.8 (*)     Estimated Avg Glucose 235 (*)     All other components within normal limits    Narrative:     ADD ON CRP PER DR AMPARO SHAH/ORDER# [id number] @ 23:01 3/22/19  ADD ON A1C, PT AND SED RATE PER DR AMPARO SHAH/ORDER# [id number],   [id number] @ 22:38 3/22/19   SEDIMENTATION RATE - Abnormal; Notable for the following components:    Sed Rate 90 (*)     All other components within normal limits    Narrative:     ADD ON A1C, PT AND SED RATE PER DR AMPARO SHAH/ORDER# [id number],   [id number] AND [id number] @ 22:38 3/22/19   C-REACTIVE PROTEIN - Abnormal; Notable for the following components:    .2 (*)     All other components within normal limits    Narrative:     ADD ON CRP PER DR AMPARO SHAH/ORDER# [id number] @ 23:01 3/22/19  ADD ON A1C, PT AND SED RATE PER DR AMPARO SHAH/ORDER# [id number],   [id number] AND [id number] @ 22:38 3/22/19   URINALYSIS MICROSCOPIC - Abnormal; Notable for the following components:    RBC, UA 15 (*)     WBC, UA 47 (*)     Bacteria, UA Many (*)     Yeast, UA Occasional (*)     All other components within normal limits    Narrative:     Preferred Collection Type->Urine, Clean Catch   CULTURE, AEROBIC  (SPECIFY SOURCE)   CULTURE, AEROBIC  (SPECIFY SOURCE)   CULTURE, AEROBIC  (SPECIFY SOURCE)   CULTURE, URINE   LACTIC ACID, PLASMA   UREA  NITROGEN, URINE, RANDOM    Narrative:     Preferred Collection Type->Urine, Clean Catch   C-REACTIVE PROTEIN   CREATININE, URINE, RANDOM   HEMOGLOBIN A1C   PROTIME-INR   SEDIMENTATION RATE   PROTIME-INR    Narrative:     ADD ON CRP PER DR AMPARO SHAH/ORDER# [id number] @ 23:01 3/22/19  ADD ON A1C, PT AND SED RATE PER DR AMPARO SHAH/ORDER# [id number],   [id number] AND [id number] @ 22:38 3/22/19   CREATININE, URINE, RANDOM    Narrative:     Preferred Collection Type->Urine, Clean Catch          Imaging Results          MRI Foot (Hindfoot) Left Without Contrast (Final result)     Abnormal  Result time 03/23/19 09:46:19    Final result by Ranjeet Escalante MD (03/23/19 09:46:19)                 Impression:      Destructive/ neuropathic hindfoot joint changes with nonunion calcaneal fracture and extensive reactive marrow edema.  Superimposed osteomyelitis may be present.    This report was flagged in Epic as abnormal.    Electronically signed by resident: Darrell Williamson  Date:    03/23/2019  Time:    08:02    Electronically signed by: Ranjeet Escalante MD  Date:    03/23/2019  Time:    09:46             Narrative:    EXAMINATION:  MRI FOOT (HINDFOOT) LEFT WITHOUT CONTRAST    CLINICAL HISTORY:  Osteomyelitis suspected, foot swelling, diabetic;    TECHNIQUE:  Routine multiplanar non-contrast MR imaging of the right ankle/hindfoot was performed.    COMPARISON:  Ankle radiograph 03/22/2019    FINDINGS:  Throughout the hindfoot there is extensive abnormal marrow edema and T1 hypointensity with cortical irregularity involving the calcaneus, talus, navicular.  The deformity of the calcaneus appears extensive with a nonunion fracture, as seen on prior radiograph.    The fibula appears involved with extensive bone marrow edema and T1 cortical hypointensity at its distal margin.  The tibia appears relatively well preserved with minimal bone marrow edema and subtle posterior T1 cortical abnormality.    Subcutaneous edema and fluid signal  underlying the plantar fascia.    Extensive degenerative changes throughout the tarsal metatarsal joints.    Artifact from foreign bodies noted in the ft, correlating with prior radiograph.    Dystrophic calcification about the attachment of the Achilles tendon.                               US Retroperitoneal Complete (Kidney and (Final result)  Result time 03/22/19 23:46:30    Final result by Ankush Covington MD (03/22/19 23:46:30)                 Impression:      Sonographic findings compatible with chronic medical renal disease.  No hydronephrosis.    Electronically signed by resident: Julio César Marshall  Date:    03/22/2019  Time:    23:14    Electronically signed by: Ankush Covington MD  Date:    03/22/2019  Time:    23:46             Narrative:    EXAMINATION:  US RETROPERITONEAL COMPLETE    CLINICAL HISTORY:  LOREN;    TECHNIQUE:  Ultrasound of the kidneys and urinary bladder was performed including color flow and Doppler evaluation of the kidneys.    COMPARISON:  CT 12/13/2017; ultrasound 08/09/2017, 04/18/2017.    FINDINGS:  Right kidney: The right kidney measures 10.6 cm. There is cortical thinning and loss of corticomedullary distinction.  Perfusion is decreased.  Resistive index measures 0.84.  No mass. No renal stone. No hydronephrosis.    Left kidney: The left kidney measures 9.7 cm. There is cortical thinning and loss of corticomedullary distinction.  Perfusion is decreased.  Resistive index measures 0.85.  No mass. No renal stone. No hydronephrosis.    The bladder is partially distended at the time of scanning and has an unremarkable appearance.                               X-Ray Tibia Fibula 2 View Left (Final result)  Result time 03/22/19 19:30:57    Final result by Ankush Covington MD (03/22/19 19:30:57)                 Impression:      Abnormal examination, with several findings detailed above.  If there is concern for osteomyelitis, MRI may provide improved characterization.    Extensive soft  tissue swelling and several radiopaque foreign bodies in the foot soft tissues.      Electronically signed by: Ankush Covington MD  Date:    03/22/2019  Time:    19:30             Narrative:    EXAMINATION:  XR ANKLE COMPLETE 3 VIEW LEFT; XR FOOT COMPLETE 3 VIEW LEFT; XR TIBIA FIBULA 2 VIEW LEFT    CLINICAL HISTORY:  Cellulitis, unspecified    TECHNIQUE:  Three views left foot, two views left tibia/fibula, and three views left ankle.    COMPARISON:  09/17/2015.    FINDINGS:  Bones are demineralized.  Angulation deformities noted involving the toes.  There is severe chronic changes and collapse involving the calcaneus.  There is a fracture through the calcaneus which is age-indeterminate, best seen on the lateral radiograph of the foot.  Multiple areas of chronic appearing periosteal reaction in the tibia and fibula.  Multifocal abnormal soft tissue calcifications noted throughout the calf and ankle.  Significant soft tissue edema noted throughout the left lower extremity including surrounding the ankle and along the dorsum of the foot.  There are several linear metallic radiopaque foreign bodies in the foot soft tissues which overall appear similar to the prior exam from 2015.                               X-Ray Foot Complete Left (Final result)  Result time 03/22/19 19:30:57    Final result by Ankush Covington MD (03/22/19 19:30:57)                 Impression:      Abnormal examination, with several findings detailed above.  If there is concern for osteomyelitis, MRI may provide improved characterization.    Extensive soft tissue swelling and several radiopaque foreign bodies in the foot soft tissues.      Electronically signed by: Ankush Covington MD  Date:    03/22/2019  Time:    19:30             Narrative:    EXAMINATION:  XR ANKLE COMPLETE 3 VIEW LEFT; XR FOOT COMPLETE 3 VIEW LEFT; XR TIBIA FIBULA 2 VIEW LEFT    CLINICAL HISTORY:  Cellulitis, unspecified    TECHNIQUE:  Three views left foot, two views left  tibia/fibula, and three views left ankle.    COMPARISON:  09/17/2015.    FINDINGS:  Bones are demineralized.  Angulation deformities noted involving the toes.  There is severe chronic changes and collapse involving the calcaneus.  There is a fracture through the calcaneus which is age-indeterminate, best seen on the lateral radiograph of the foot.  Multiple areas of chronic appearing periosteal reaction in the tibia and fibula.  Multifocal abnormal soft tissue calcifications noted throughout the calf and ankle.  Significant soft tissue edema noted throughout the left lower extremity including surrounding the ankle and along the dorsum of the foot.  There are several linear metallic radiopaque foreign bodies in the foot soft tissues which overall appear similar to the prior exam from 2015.                               X-Ray Ankle Complete Left (Final result)  Result time 03/22/19 19:30:57    Final result by Ankush Covington MD (03/22/19 19:30:57)                 Impression:      Abnormal examination, with several findings detailed above.  If there is concern for osteomyelitis, MRI may provide improved characterization.    Extensive soft tissue swelling and several radiopaque foreign bodies in the foot soft tissues.      Electronically signed by: Ankush Covington MD  Date:    03/22/2019  Time:    19:30             Narrative:    EXAMINATION:  XR ANKLE COMPLETE 3 VIEW LEFT; XR FOOT COMPLETE 3 VIEW LEFT; XR TIBIA FIBULA 2 VIEW LEFT    CLINICAL HISTORY:  Cellulitis, unspecified    TECHNIQUE:  Three views left foot, two views left tibia/fibula, and three views left ankle.    COMPARISON:  09/17/2015.    FINDINGS:  Bones are demineralized.  Angulation deformities noted involving the toes.  There is severe chronic changes and collapse involving the calcaneus.  There is a fracture through the calcaneus which is age-indeterminate, best seen on the lateral radiograph of the foot.  Multiple areas of chronic appearing periosteal  reaction in the tibia and fibula.  Multifocal abnormal soft tissue calcifications noted throughout the calf and ankle.  Significant soft tissue edema noted throughout the left lower extremity including surrounding the ankle and along the dorsum of the foot.  There are several linear metallic radiopaque foreign bodies in the foot soft tissues which overall appear similar to the prior exam from 2015.                                 Medical Decision Making:   History:   Old Medical Records: I decided to obtain old medical records.  Initial Assessment:   74WF with PMHx of CKDIII, T2DM with diabetic peripheral neuropathy, lymphedema, and chronic lower extremity ulcers who presents to the ED per home health nurse recommendations for evaluation of worsening LE ulcerations. PE reveals a non-toxic, afebrile, obese female in NAD. Vital signs are stable.  Differential Diagnosis:   DDx includes but is not limited to: cellulitis, abscess, osteomyelitis, sepsis.  Clinical Tests:   Lab Tests: Ordered and Reviewed  Radiological Study: Ordered and Reviewed  ED Management:  Will obtain basic labs, lactate, blood cxs, X-rays left ankle/foot/tib/fib. Will give Zofran for nausea.    CBC remarkable for leukocytosis (13.66), H&H low (at baseline). Negative lactate. CMP remarkable for hyponatremia 129, hypochloremia 93, Glu 295, BUN 27, Cr 1.9 (BL 1.4-1.6), elevated LFTs: T-bili 2.7, AlkPhos 156, ALT 9. UA remarkable for 3+ leuks, neg nitrites, 15 RBCs, 47 WBCs, many bacteria, and occasional yeast. X-rays demonstrate extensive soft tissue swelling with concern for osteo, recommend MRI for further evaluation.     Per chart review, previous wound cultures positive for pseudomonas, proteus mirabilis, and klebsiella oxytoca. Will treat Vancomycin and Rocephin. Repeat cultures pending. Will admit patient to hospital medicine for further evaluation and management. Patient informed of plan for admission and is agreeable. I have discussed patient  case with my supervisory physician, who is in agreement with my assessment and plan.                Attending Attestation:     Physician Attestation Statement for NP/PA:   I have conducted a face to face encounter with this patient in addition to the NP/PA, due to Medical Complexity    Other NP/PA Attestation Additions:    History of Present Illness: 74 F hx of CKD, DM, venous statis disease with ulcers of lower extremities followed by wound care here today with worsening pain, swelling and warmth to the left lower extremities specifically over the heel for the past day.  Pt was referred to ED by  nurse.  Pt also reports one episode of vomiting earlier today.    Physical Exam: General: obese female, no acute distress  Lungs: CTA  Cards: nl S1S2  Abd: soft, nontender, (-) RUQ tenderness  Skin: see pics below.  Multiple chronic ulcers of lower extremity without drainage, foul smell or warmth.  There is mild erythema. - exam consistent with venous stasis.   Area of concern is left foot with ulcer to heel, tenderness, erythema, warmth from heel to midfoot. Concern for infectious process.       Medical Decision Making: Developing osteo of left heel vs infectious process.  Previous wound cultures (+) proteus, pseudomonas, klebsiella. Repeat cultures ordered.      Pt treated with vanc/rocep.  May need pseudomonas coverage. ( allergic to penicillin)               Complexity:  High - level 5    Shreyas Elizabeth DO  Dept of Emergency Medicine   [facility]  Spectralink: [id number]             Clinical Impression:       ICD-10-CM ICD-9-CM   1. Cellulitis L03.90 682.9   2. Choledocholithiasis K80.50 574.50         Disposition:   Disposition: Admitted  Condition: Stable           Right anterior leg       Left anterior leg      Right foot      Right medial heel      Left medial heel      Left posterior leg      Right posterior leg      Left foot                       Marya Guerrero PA-C  03/23/19 [id number]       Shreyas MARTIN  DO [place]  03/26/19 [id number]

## 2019-03-22 NOTE — TELEPHONE ENCOUNTER
----- Message from Anna Robertson LPN sent at 3/22/2019 10:19 AM CDT -----  Contact: [place]  Home health nurse called to advised patient has new wounds - see message below    ----- Message -----  From: Mary Ann Carmichael  Sent: 3/22/2019   9:25 AM  To: [facility] Staff    Needs Advice    Reason for call:  Wanted to let you know that the patient has two new wounds located on the right leg and the left leg         Communication Preference: [phone]    Additional Information:

## 2019-03-23 PROBLEM — Z71.89 GOALS OF CARE, COUNSELING/DISCUSSION: Status: ACTIVE | Noted: 2019-03-23

## 2019-03-23 LAB
ALBUMIN SERPL BCP-MCNC: 2.1 G/DL
ALP SERPL-CCNC: 145 U/L
ALT SERPL W/O P-5'-P-CCNC: 8 U/L
ANION GAP SERPL CALC-SCNC: 10 MMOL/L
AST SERPL-CCNC: 10 U/L
BASOPHILS # BLD AUTO: 0.02 K/UL
BASOPHILS NFR BLD: 0.2 %
BILIRUB SERPL-MCNC: 1.9 MG/DL
BUN SERPL-MCNC: 27 MG/DL
CALCIUM SERPL-MCNC: 9.1 MG/DL
CHLORIDE SERPL-SCNC: 96 MMOL/L
CO2 SERPL-SCNC: 26 MMOL/L
CREAT SERPL-MCNC: 1.6 MG/DL
CREAT UR-MCNC: 71 MG/DL
DIFFERENTIAL METHOD: ABNORMAL
EOSINOPHIL # BLD AUTO: 0 K/UL
EOSINOPHIL NFR BLD: 0.1 %
ERYTHROCYTE [DISTWIDTH] IN BLOOD BY AUTOMATED COUNT: 13.2 %
EST. GFR  (AFRICAN AMERICAN): 35.8 ML/MIN/1.73 M^2
EST. GFR  (NON AFRICAN AMERICAN): 31.1 ML/MIN/1.73 M^2
GLUCOSE SERPL-MCNC: 217 MG/DL
HCT VFR BLD AUTO: 32.2 %
HGB BLD-MCNC: 10.3 G/DL
IMM GRANULOCYTES # BLD AUTO: 0.06 K/UL
IMM GRANULOCYTES NFR BLD AUTO: 0.5 %
LYMPHOCYTES # BLD AUTO: 0.9 K/UL
LYMPHOCYTES NFR BLD: 7.7 %
MAGNESIUM SERPL-MCNC: 1.5 MG/DL
MCH RBC QN AUTO: 28.7 PG
MCHC RBC AUTO-ENTMCNC: 32 G/DL
MCV RBC AUTO: 90 FL
MONOCYTES # BLD AUTO: 0.7 K/UL
MONOCYTES NFR BLD: 6.1 %
NEUTROPHILS # BLD AUTO: 9.7 K/UL
NEUTROPHILS NFR BLD: 85.4 %
NRBC BLD-RTO: 0 /100 WBC
PHOSPHATE SERPL-MCNC: 2.7 MG/DL
PLATELET # BLD AUTO: 260 K/UL
PMV BLD AUTO: 9 FL
POCT GLUCOSE: 158 MG/DL (ref 70–110)
POCT GLUCOSE: 182 MG/DL (ref 70–110)
POCT GLUCOSE: 205 MG/DL (ref 70–110)
POCT GLUCOSE: 239 MG/DL (ref 70–110)
POTASSIUM SERPL-SCNC: 3.7 MMOL/L
PROT SERPL-MCNC: 7 G/DL
RBC # BLD AUTO: 3.59 M/UL
SODIUM SERPL-SCNC: 132 MMOL/L
UUN UR-MCNC: 293 MG/DL
VANCOMYCIN SERPL-MCNC: 25.8 UG/ML
VANCOMYCIN TROUGH SERPL-MCNC: 17 UG/ML
WBC # BLD AUTO: 11.4 K/UL

## 2019-03-23 PROCEDURE — 94761 N-INVAS EAR/PLS OXIMETRY MLT: CPT | Mod: HCNC

## 2019-03-23 PROCEDURE — 87186 SC STD MICRODIL/AGAR DIL: CPT | Mod: HCNC

## 2019-03-23 PROCEDURE — 83735 ASSAY OF MAGNESIUM: CPT | Mod: HCNC

## 2019-03-23 PROCEDURE — 87070 CULTURE OTHR SPECIMN AEROBIC: CPT | Mod: HCNC

## 2019-03-23 PROCEDURE — 99223 1ST HOSP IP/OBS HIGH 75: CPT | Mod: HCNC,,, | Performed by: PODIATRIST

## 2019-03-23 PROCEDURE — 99223 PR INITIAL HOSPITAL CARE,LEVL III: ICD-10-PCS | Mod: HCNC,,, | Performed by: PODIATRIST

## 2019-03-23 PROCEDURE — 36415 COLL VENOUS BLD VENIPUNCTURE: CPT | Mod: HCNC

## 2019-03-23 PROCEDURE — 25000003 PHARM REV CODE 250: Mod: HCNC | Performed by: STUDENT IN AN ORGANIZED HEALTH CARE EDUCATION/TRAINING PROGRAM

## 2019-03-23 PROCEDURE — 99223 1ST HOSP IP/OBS HIGH 75: CPT | Mod: HCNC,AI,GC, | Performed by: HOSPITALIST

## 2019-03-23 PROCEDURE — 63600175 PHARM REV CODE 636 W HCPCS: Mod: HCNC | Performed by: STUDENT IN AN ORGANIZED HEALTH CARE EDUCATION/TRAINING PROGRAM

## 2019-03-23 PROCEDURE — 80053 COMPREHEN METABOLIC PANEL: CPT | Mod: HCNC

## 2019-03-23 PROCEDURE — 87075 CULTR BACTERIA EXCEPT BLOOD: CPT | Mod: HCNC

## 2019-03-23 PROCEDURE — 80202 ASSAY OF VANCOMYCIN: CPT | Mod: 91,HCNC

## 2019-03-23 PROCEDURE — 99232 SBSQ HOSP IP/OBS MODERATE 35: CPT | Mod: HCNC,,, | Performed by: INTERNAL MEDICINE

## 2019-03-23 PROCEDURE — 87076 CULTURE ANAEROBE IDENT EACH: CPT | Mod: HCNC

## 2019-03-23 PROCEDURE — 99232 PR SUBSEQUENT HOSPITAL CARE,LEVL II: ICD-10-PCS | Mod: HCNC,,, | Performed by: INTERNAL MEDICINE

## 2019-03-23 PROCEDURE — 80202 ASSAY OF VANCOMYCIN: CPT | Mod: HCNC

## 2019-03-23 PROCEDURE — 97166 OT EVAL MOD COMPLEX 45 MIN: CPT | Mod: HCNC

## 2019-03-23 PROCEDURE — 87077 CULTURE AEROBIC IDENTIFY: CPT | Mod: HCNC

## 2019-03-23 PROCEDURE — 99223 PR INITIAL HOSPITAL CARE,LEVL III: ICD-10-PCS | Mod: HCNC,AI,GC, | Performed by: HOSPITALIST

## 2019-03-23 PROCEDURE — 84100 ASSAY OF PHOSPHORUS: CPT | Mod: HCNC

## 2019-03-23 PROCEDURE — 11000001 HC ACUTE MED/SURG PRIVATE ROOM: Mod: HCNC

## 2019-03-23 PROCEDURE — 85025 COMPLETE CBC W/AUTO DIFF WBC: CPT | Mod: HCNC

## 2019-03-23 RX ORDER — MEROPENEM AND SODIUM CHLORIDE 1 G/50ML
1 INJECTION, SOLUTION INTRAVENOUS
Status: DISCONTINUED | OUTPATIENT
Start: 2019-03-23 | End: 2019-03-24

## 2019-03-23 RX ORDER — VANCOMYCIN 1.75 GRAM/500 ML IN 0.9 % SODIUM CHLORIDE INTRAVENOUS
1750
Status: DISCONTINUED | OUTPATIENT
Start: 2019-03-23 | End: 2019-03-25

## 2019-03-23 RX ADMIN — MEROPENEM AND SODIUM CHLORIDE 1 G: 1 INJECTION, SOLUTION INTRAVENOUS at 03:03

## 2019-03-23 RX ADMIN — ASPIRIN 81 MG CHEWABLE TABLET 81 MG: 81 TABLET CHEWABLE at 09:03

## 2019-03-23 RX ADMIN — HEPARIN SODIUM 5000 UNITS: 5000 INJECTION, SOLUTION INTRAVENOUS; SUBCUTANEOUS at 02:03

## 2019-03-23 RX ADMIN — HEPARIN SODIUM 5000 UNITS: 5000 INJECTION, SOLUTION INTRAVENOUS; SUBCUTANEOUS at 09:03

## 2019-03-23 RX ADMIN — ATORVASTATIN CALCIUM 40 MG: 10 TABLET, FILM COATED ORAL at 08:03

## 2019-03-23 RX ADMIN — CLOPIDOGREL 75 MG: 75 TABLET, FILM COATED ORAL at 09:03

## 2019-03-23 RX ADMIN — VANCOMYCIN HYDROCHLORIDE 1750 MG: 100 INJECTION, POWDER, LYOPHILIZED, FOR SOLUTION INTRAVENOUS at 09:03

## 2019-03-23 RX ADMIN — INSULIN ASPART 4 UNITS: 100 INJECTION, SOLUTION INTRAVENOUS; SUBCUTANEOUS at 12:03

## 2019-03-23 RX ADMIN — MICONAZOLE NITRATE: 20 CREAM TOPICAL at 08:03

## 2019-03-23 RX ADMIN — SODIUM CHLORIDE 500 ML: 0.9 INJECTION, SOLUTION INTRAVENOUS at 06:03

## 2019-03-23 RX ADMIN — INSULIN DETEMIR 15 UNITS: 100 INJECTION, SOLUTION SUBCUTANEOUS at 09:03

## 2019-03-23 RX ADMIN — INSULIN ASPART 4 UNITS: 100 INJECTION, SOLUTION INTRAVENOUS; SUBCUTANEOUS at 05:03

## 2019-03-23 RX ADMIN — HEPARIN SODIUM 5000 UNITS: 5000 INJECTION, SOLUTION INTRAVENOUS; SUBCUTANEOUS at 06:03

## 2019-03-23 NOTE — SUBJECTIVE & OBJECTIVE
Scheduled Meds:   aspirin  81 mg Oral Daily    atorvastatin  40 mg Oral QHS    clopidogrel  75 mg Oral Daily    heparin (porcine)  5,000 Units Subcutaneous Q8H    insulin detemir U-100  15 Units Subcutaneous QHS    meropenem (MERREM) IVPB  1 g Intravenous Q12H    miconazole   Topical (Top) BID    vancomycin (VANCOCIN) IVPB  1,750 mg Intravenous Q24H     Continuous Infusions:  PRN Meds:acetaminophen, acetaminophen, dextrose 50%, dextrose 50%, diphenhydrAMINE, glucagon (human recombinant), glucose, glucose, insulin aspart U-100, ramelteon, sodium chloride 0.9%    Review of patient's allergies indicates:   Allergen Reactions    Penicillins Hives     Other reaction(s): Hives    Sulfa (sulfonamide antibiotics) Other (See Comments)     Eyad, pt states her doctor told her the shakes were possibly caused by an allergy to sulfa        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    Benign essential hypertension 11/14/2012    Cataract     s/p phacoemulsification    Chronic kidney disease (CKD), stage III (moderate) 9/12/2013    Diabetic peripheral neuropathy associated with type 2 diabetes mellitus 11/14/2014    causing right hemiparesis    Gait disorder     Hyperlipidemia     Iritis - Both Eyes 6/10/2013    Kidney stone     Lymphedema     Morbid obesity with BMI of 40.0-44.9, adult 2/18/2015    Nephrolithiasis 4/20/2016    NS (nuclear sclerosis) 4/1/2013    Nuclear sclerosis - Both Eyes 4/29/2013    Preseptal cellulitis - Right Eye 4/29/2013    Proliferative diabetic retinopathy - Both Eyes 4/29/2013    Proliferative diabetic retinopathy, both eyes 4/1/2013    PSC (posterior subcapsular cataract) - Both Eyes 4/29/2013    S/P hernia repair 12/19/2012    TIA (transient ischemic attack) 11/18/2014    Tinea pedis 7/24/2012    Tinea pedis is present on both feet.     Type 2 diabetes mellitus with renal manifestations, controlled 12/12/2013    Type  2 diabetes, controlled, with moderate nonproliferative diabetic retinopathy without macular edema 9/17/2015    Ulcer of left lower extremity, limited to breakdown of skin 7/8/2015    Unspecified cerebral artery occlusion with cerebral infarction 11/16/2014    Unspecified venous (peripheral) insufficiency     Ureteral stone with hydronephrosis 1/27/2016    UTI (lower urinary tract infection)     Vaginal infection     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     Past Surgical History:   Procedure Laterality Date    APPENDECTOMY      CATARACT EXTRACTION W/  INTRAOCULAR LENS IMPLANT Left 5/21/2013    CATARACT EXTRACTION W/  INTRAOCULAR LENS IMPLANT Right 6/4/2013    CHOLECYSTECTOMY      COLONOSCOPY  12/22/2005    normal    CYSTOSCOPY  4/20/2016    Performed by Oliver Duke [facility] at [facility]    CYSTOSCOPY WITH STENT PLACEMENT Right 1/27/2016    Performed by Oliver Duke [facility] at [facility]    ESOPHAGOGASTRODUODENOSCOPY  12/21/2015    hiatal hernia, Schatzki ring    ESOPHAGOGASTRODUODENOSCOPY (EGD) N/A 6/30/2014    Performed by Jaspreet Ng [facility] at [facility] (2ND FLR)    EYE SURGERY Bilateral 2008    laser surgery both eyes    INSERTION, IOL PROSTHESIS Right 6/4/2013    Performed by Federico Schwartz MD at [facility] OR 1ST FLR    INSERTION, IOL PROSTHESIS Left 5/21/2013    Performed by [facility]ico Schwartz [facility] at [facility] OR 1ST FLR    NASAL SEPTUM SURGERY      PHACOEMULSIFICATION, CATARACT Right 6/4/2013    Performed by [facility] Schwartz [facility] at [facility] OR 1ST FLR    PHACOEMULSIFICATION, CATARACT Left 5/21/2013    Performed by [facility] Schwartz [facility] at [facility] OR 1ST FLR    PYELOGRAM-RETROGRADE Right 4/20/2016    Performed by Oliver Duke [facility] at [facility] OR    REMOVAL-STENT-URETERAL Right 4/20/2016    Performed by Oliver Duke MD at [facility] OR    SUBTOTAL COLECTOMY  12/13/2012    transverse colon, for incarcerated umbilical hernia, Dr. Kat Bower    URETEROSCOPY Right 4/20/2016     Performed by [facility] at [facility] OR       Family History     Problem Relation (Age of Onset)    Cataracts Sister, Brother    Diabetes Sister    Heart disease Brother    Leukemia Mother        Tobacco Use    Smoking status: Former Smoker     Packs/day: 0.50     Years: 15.00     Pack years: 7.50     Types: Cigarettes     Last attempt to quit: 1982     Years since quittin.7    Smokeless tobacco: Former User    Tobacco comment: smoked one pack per week   Substance and Sexual Activity    Alcohol use: No    Drug use: No    Sexual activity: Not Currently     Review of Systems   Constitutional: Negative for chills, fatigue and fever.   Respiratory: Negative for shortness of breath.    Cardiovascular: Positive for leg swelling. Negative for chest pain.   Gastrointestinal: Negative for nausea and vomiting.   Skin: Positive for color change and wound.   Neurological: Positive for numbness.   Psychiatric/Behavioral: Negative for agitation.     Objective:     Vital Signs (Most Recent):  Temp: (P) 97 °F (36.1 °C) (19 1524)  Pulse: (P) 78 (19 1524)  Resp: (P) 16 (19 1524)  BP: (!) (P) 142/63 (19 1524)  SpO2: (P) 97 % (19 1524) Vital Signs (24h Range):  Temp:  [97 °F (36.1 °C)-98.7 °F (37.1 °C)] (P) 97 °F (36.1 °C)  Pulse:  [78-92] (P) 78  Resp:  [14-24] (P) 16  SpO2:  [95 %-99 %] (P) 97 %  BP: (101-167)/(52-72) (P) 142/63     Weight: (!) 143.8 kg (317 lb)  Body mass index is 49.65 kg/m².    Foot Exam    General  General Appearance: appears stated age and healthy   Orientation: alert and oriented to person, place, and time   Affect: appropriate       Right Foot/Ankle     Inspection and Palpation  Ecchymosis: dorsum  Swelling: dorsum   Skin Exam: cellulitis, ulcer and erythema; no drainage     Neurovascular  Dorsalis pedis: 1+  Posterior tibial: absent  Saphenous nerve sensation: diminished  Tibial nerve sensation: diminished  Superficial peroneal nerve sensation:  diminished  Deep peroneal nerve sensation: diminished  Sural nerve sensation: diminished      Left Foot/Ankle      Inspection and Palpation  Ecchymosis: dorsum  Swelling: dorsum   Skin Exam: cellulitis, ulcer and erythema; no drainage     Neurovascular  Dorsalis pedis: 1+  Posterior tibial: absent  Saphenous nerve sensation: diminished  Tibial nerve sensation: diminished  Superficial peroneal nerve sensation: diminished  Deep peroneal nerve sensation: diminished  Sural nerve sensation: diminished            Laboratory:  A1C:   Recent Labs   Lab 03/22/19  1744   HGBA1C 9.8*     Blood Cultures:   Recent Labs   Lab 03/22/19  1755 03/22/19 1757   LABBLOO No Growth to date No Growth to date     ESR:   Recent Labs   Lab 03/22/19  1744   SEDRATE 90*     Prealbumin: No results for input(s): PREALBUMIN in the last 48 hours.  Wound Cultures:   Recent Labs   Lab 11/21/18  1120   LABAERO PSEUDOMONAS AERUGINOSA  Moderate    KLEBSIELLA OXYTOCA  Few    PROTEUS MIRABILIS  Moderate       Microbiology Results (last 7 days)     Procedure Component Value Units Date/Time    Culture, Anaerobe [[id number]] Collected:  03/23/19 1119    Order Status:  Sent Specimen:  Wound from Foot, Left Updated:  03/23/19 1217    Aerobic culture [[id number]] Collected:  03/23/19 1119    Order Status:  Sent Specimen:  Wound from Foot, Left Updated:  03/23/19 1217    Blood culture #2 **CANNOT BE ORDERED STAT** [[id number]] Collected:  03/22/19 1757    Order Status:  Completed Specimen:  Blood from Peripheral, Forearm, Right Updated:  03/23/19 0715     Blood Culture, Routine No Growth to date    Blood culture #1 **CANNOT BE ORDERED STAT** [[id number]] Collected:  03/22/19 1755    Order Status:  Completed Specimen:  Blood from Peripheral, Hand, Right Updated:  03/23/19 0715     Blood Culture, Routine No Growth to date    Aerobic culture [[id number]] Collected:  03/22/19 2200    Order Status:  Sent Specimen:  Wound from Foot, Left Updated:  03/22/19 6784     Aerobic culture [[id number]] Collected:  03/22/19 2200    Order Status:  Sent Specimen:  Wound from Toe, Left Foot Updated:  03/22/19 2356    Aerobic culture [[id number]] Collected:  03/22/19 2200    Order Status:  Sent Specimen:  Wound from Foot, Left Updated:  03/22/19 2356    Urine culture [[id number]] Collected:  03/22/19 2200    Order Status:  No result Specimen:  Urine Updated:  03/22/19 2350        Specimen (12h ago, onward)    None          Diagnostic Results:  MRI: I have reviewed all pertinent results/findings within the past 24 hours.  reviewed  X-Ray: I have reviewed all pertinent results/findings within the past 24 hours.  reviewed  I have reviewed all pertinent imaging results/findings within the past 24 hours.    Clinical Findings:    Multiple superficial granular wounds noted to yessenia LE with periwound erythema and edema noted. Wounds do not probe to bone. No malodor or purulent drainage noted. No clinical signs of abscess noted. Wound to left heel does not probe to bone. Cellulitis noted to left heel and ankle.

## 2019-03-23 NOTE — ED NOTES
Hourly rounding performed at this time. Patient is in bed awake and alert, resting comfortably. No pain, acute distress or discomfort reported or observed. Assessed for need of repositioning and given opportunity for toileting. Discussed care plan, patient denies any additional needs at this time and has no complaints or questions. Room assessed for safety measures and cleanliness, no action needed at this time. The bed is in low, locked position with side rails up x 2. Personal belongings and call light in reach. Patient is oriented to call light use. Patient verbalizes will call nurse if assistance is needed.

## 2019-03-23 NOTE — H&P
[facility] Medicine  History & Physical    Patient Name: Sherin Ortiz  MRN: [id number]  Admission Date: 3/22/2019  Attending Physician: Maty Cohen MD   Primary Care Provider: Ame Vasquez MD    [facility] Medicine Team: [facility] MED 5 Behram Khan, MD     Patient information was obtained from patient, relative(s), past medical records and ER records.     Subjective:     Principal Problem:Cellulitis    Chief Complaint:   Chief Complaint   Patient presents with    Cellulitis     arrived via EMS from home with c/o cellulitis to bilateral legs        HPI: Ms Ortiz is a 73 yo F w PMHx significant for nonischemic cardiomyopathy resulting in HFpEF, chronic venous stasis resulting in dermatitis and chronic wounds affecting lower extremities, uncontrolled diabetes, HTN, h/o TIA, neuropathy and CKD 3 (baseline Cr of 1.4-1.6)    Pt presents for evaluation of left heel pain of two day duration. Pain had 10/10 intensity and came on abruptly while seated in her wheelchair. Pain felt like deep pressure on the left heel underneath one of her chronic wounds. Pain is exacerbated by transfers from her wheelchair. She cannot think of what may have caused this pain as she denies trauma to the area and even takes care to transfer on inverted feet so as not to apply direct pressure on the wound. She reports clear drainage from the wound, but states this has been an ongoing issue and is not new. She visits with wound care Nurse Lyle to receive treatment of multiple wounds and ulcers on bilateral lower extremities. She states she is adherent to the regiment prescribed and has been receiving home visits from skilled nursing three times per week. Despite this, she unfortunately has developed a new ulcer on her right leg.     She denies fever, purulent drainage or recent weight loss. Review of systems was positive for nausea with one episode of self-induced vomiting which resolved. Other than  transfers, pt does not ambulate.      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    Benign essential hypertension 11/14/2012    Cataract     s/p phacoemulsification    Chronic kidney disease (CKD), stage III (moderate) 9/12/2013    Diabetic peripheral neuropathy associated with type 2 diabetes mellitus 11/14/2014    causing right hemiparesis    Gait disorder     Hyperlipidemia     Iritis - Both Eyes 6/10/2013    Kidney stone     Lymphedema     Morbid obesity with BMI of 40.0-44.9, adult 2/18/2015    Nephrolithiasis 4/20/2016    NS (nuclear sclerosis) 4/1/2013    Nuclear sclerosis - Both Eyes 4/29/2013    Preseptal cellulitis - Right Eye 4/29/2013    Proliferative diabetic retinopathy - Both Eyes 4/29/2013    Proliferative diabetic retinopathy, both eyes 4/1/2013    PSC (posterior subcapsular cataract) - Both Eyes 4/29/2013    S/P hernia repair 12/19/2012    TIA (transient ischemic attack) 11/18/2014    Tinea pedis 7/24/2012    Tinea pedis is present on both feet.     Type 2 diabetes mellitus with renal manifestations, controlled 12/12/2013    Type 2 diabetes, controlled, with moderate nonproliferative diabetic retinopathy without macular edema 9/17/2015    Ulcer of left lower extremity, limited to breakdown of skin 7/8/2015    Unspecified cerebral artery occlusion with cerebral infarction 11/16/2014    Unspecified venous (peripheral) insufficiency     Ureteral stone with hydronephrosis 1/27/2016    UTI (lower urinary tract infection)     Vaginal infection     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       Past Surgical History:   Procedure Laterality Date    APPENDECTOMY      CATARACT EXTRACTION W/  INTRAOCULAR LENS IMPLANT Left 5/21/2013    CATARACT EXTRACTION W/  INTRAOCULAR LENS IMPLANT Right 6/4/2013    CHOLECYSTECTOMY      COLONOSCOPY  12/22/2005    normal    CYSTOSCOPY  4/20/2016    Performed by Oliver Duke MD at  [facility] OR    CYSTOSCOPY WITH STENT PLACEMENT Right 1/27/2016    Performed by Oliver [facility] at [facility] OR    ESOPHAGOGASTRODUODENOSCOPY  12/21/2015    hiatal hernia, Schatzki ring    ESOPHAGOGASTRODUODENOSCOPY (EGD) N/A 6/30/2014    Performed by [facility] Ng [facility] at [facility] ENDO (2ND FLR)    EYE SURGERY Bilateral 2008    laser surgery both eyes    INSERTION, IOL PROSTHESIS Right 6/4/2013    Performed by Federico Schwartz MD at [facility] OR 1ST FLR    INSERTION, IOL PROSTHESIS Left 5/21/2013    Performed by [facility] at [facility] OR 1ST FLR    NASAL SEPTUM SURGERY      PHACOEMULSIFICATION, CATARACT Right 6/4/2013    Performed by [facility] at [facility] OR 1ST FLR    PHACOEMULSIFICATION, CATARACT Left 5/21/2013    Performed by [facility] at [facility] OR 1ST FLR    PYELOGRAM-RETROGRADE Right 4/20/2016    Performed by Oliver Duke [facility] at [facility] OR    REMOVAL-STENT-URETERAL Right 4/20/2016    Performed by Oliver Duke [facility] at [facility] OR    SUBTOTAL COLECTOMY  12/13/2012    transverse colon, for incarcerated umbilical hernia, Dr. Kat Bower    URETEROSCOPY Right 4/20/2016    Performed by Oliver Duke MD at [facility] OR       Review of patient's allergies indicates:   Allergen Reactions    Penicillins Hives     Other reaction(s): Hives    Sulfa (sulfonamide antibiotics) Other (See Comments)     Shajosie, pt states her doctor told her the shakes were possibly caused by an allergy to sulfa       No current facility-administered medications on file prior to encounter.      Current Outpatient Medications on File Prior to Encounter   Medication Sig    ACCU-CHEK RAMIRO PLUS TEST STRP Strp TEST TWICE DAILY    ACCU-CHEK SOFTCLIX LANCETS Misc Test twice daily    acetaminophen 500 mg/15 mL Liqd     aspirin 81 MG Chew Take 81 mg by mouth once daily.      atorvastatin (LIPITOR) 40 MG tablet TAKE 1 TABLET EVERY EVENING    BD INSULIN SYRINGE ULTRA-FINE 1/2 mL 30 gauge x  "1/2" Syrg USE EVERY NIGHT    bumetanide (BUMEX) 1 MG tablet Take 2 tablets (2 mg total) by mouth once daily.    CALCIUM CARBONATE ORAL     clopidogrel (PLAVIX) 75 mg tablet TAKE 1 TABLET EVERY DAY    diclofenac sodium 1 % Gel Apply 2 g topically nightly as needed. (Patient taking differently: Apply 2 g topically nightly as needed (pain). )    ergocalciferol (ERGOCALCIFEROL) 50,000 unit Cap Take 1 capsule (50,000 Units total) by mouth every 7 days.    fluconazole (DIFLUCAN) 200 MG Tab     gentamicin (GARAMYCIN) 0.1 % ointment Apply topically once daily.    glimepiride (AMARYL) 1 MG tablet Take 2 tablets (2 mg total) by mouth with lunch.    hydroCHLOROthiazide (HYDRODIURIL) 25 MG tablet Take 1 tablet (25 mg total) by mouth once daily.    ibuprofen (ADVIL,MOTRIN) 200 MG tablet     ketoconazole (NIZORAL) 2 % cream Apply topically once daily.    LANTUS U-100 INSULIN 100 unit/mL injection INJECT 7 TO 10 UNITS SUBCUTANEOUSLY IN THE EVENING DEPENDING ON SCALE (DISCARD EACH VIAL AFTER 28 DAYS)    lisinopril 10 MG tablet     Menthol/Camphor/Phen/Salicylic (LIP BALM MEDICATED TOP)     triamcinolone acetonide 0.1% (KENALOG) 0.1 % cream Apply topically 2 (two) times daily.     Family History     Problem Relation (Age of Onset)    Cataracts Sister, Brother    Diabetes Sister    Heart disease Brother    Leukemia Mother        Tobacco Use    Smoking status: Former Smoker     Packs/day: 0.50     Years: 15.00     Pack years: 7.50     Types: Cigarettes     Last attempt to quit: 1982     Years since quittin.7    Smokeless tobacco: Former User    Tobacco comment: smoked one pack per week   Substance and Sexual Activity    Alcohol use: No    Drug use: No    Sexual activity: Not Currently     Review of Systems   Constitutional: Negative for fever and unexpected weight change.   HENT: Negative for ear pain, sinus pressure, sneezing and sore throat.    Eyes: Negative for pain and visual disturbance. "   Respiratory: Negative for cough, chest tightness, shortness of breath and wheezing.    Cardiovascular: Negative for chest pain, palpitations and leg swelling.   Gastrointestinal: Positive for nausea and vomiting. Negative for abdominal pain, constipation and diarrhea.   Endocrine: Negative for polydipsia and polyuria.   Genitourinary: Negative for decreased urine volume, difficulty urinating, dysuria and urgency.   Musculoskeletal: Negative for arthralgias and myalgias.   Skin: Positive for wound. Negative for color change and rash.   Allergic/Immunologic: Negative for environmental allergies and food allergies.   Neurological: Positive for weakness. Negative for dizziness, syncope, light-headedness and headaches.   Psychiatric/Behavioral: Negative for confusion and dysphoric mood. The patient is not nervous/anxious.      Objective:     Vital Signs (Most Recent):  Temp: 98.7 °F (37.1 °C) (03/22/19 1559)  Pulse: 90 (03/22/19 1845)  Resp: 17 (03/22/19 1845)  BP: (!) 144/65 (03/22/19 1845)  SpO2: 95 % (03/22/19 1845) Vital Signs (24h Range):  Temp:  [98.7 °F (37.1 °C)] 98.7 °F (37.1 °C)  Pulse:  [88-92] 90  Resp:  [17-21] 17  SpO2:  [95 %-99 %] 95 %  BP: (128-167)/(59-72) 144/65     Weight: (!) 143.8 kg (317 lb)  Body mass index is 49.65 kg/m².    Physical Exam   Constitutional: She is oriented to person, place, and time. She appears well-developed and well-nourished.   Body mass index is 49.65 kg/m².     HENT:   Head: Normocephalic and atraumatic.   Eyes: EOM are normal. Pupils are equal, round, and reactive to light. No scleral icterus.   Neck: Normal range of motion. Neck supple.   Cardiovascular: Normal rate, regular rhythm, normal heart sounds and intact distal pulses.   No murmur heard.  Pulmonary/Chest: Effort normal and breath sounds normal. No respiratory distress. She has no wheezes.   Abdominal: Soft. Bowel sounds are normal. She exhibits no distension. There is no tenderness.   Musculoskeletal:         Right lower leg: She exhibits edema.        Left lower leg: She exhibits edema.   Venous stasis dermatitis with wounds present.    Neurological: She is alert and oriented to person, place, and time.   Skin: Skin is warm and dry.   Psychiatric: She has a normal mood and affect. Her behavior is normal.   Vitals reviewed.    Left heel, Stage IV d/t tracking      New wound on lateral aspect of right leg        CRANIAL NERVES     CN III, IV, VI   Pupils are equal, round, and reactive to light.  Extraocular motions are normal.        Significant Labs: All pertinent labs within the past 24 hours have been reviewed.      Significant Imaging: I have reviewed all pertinent imaging results/findings within the past 24 hours.    Assessment/Plan:     Cellulitis    LEUKOCYTOSIS  VENOUS INSUFFICIENCY OF LEG  DERMATITIS, STASIS  ULCER OF LEFT HEEL AND MIDFOOT    Elderly pt with chronic wound localized to the left heel as a result of chronic venous stasis and diabetes, presents with abrupt-onset left heel pain of two day duration characterized by pressure. Physical exam reveals progression of stage 3 ulcer to stage 4 ulcer. Concerned for progression to osteomyelitis as fat pad appears to be exposed. Previous aerobic culture (11/2018) positive for P aeruginosa, K oxytoca, and P mirabalis, with variable resistance.     Plan  - ESR, CRP   - Daily CBC  - MRI hindfoot without contrast d/t LOREN  - Vancomycin, 2g q24 (CrCl of 37.6; weight 144 kg)  - Meropenem 1g q12   - Consult infectious disease  - Consult podiatry               Prolonged Q-T interval on ECG    On presentation to ED, initial EKG with QTc of 511 msec. Appears to be chronic phenomenon.     Plan  - Mg lvl as part of RFP  - Avoid anti-emetics unless necessary       Acute renal failure superimposed on stage 3 chronic kidney disease    Pt with chronic kidney disease stage 3 has baseline Cr of 1.4 to 1.6. Currently with Cr of 1.9 concerning for acute renal failure. Received 500 cc  NS bolus in emergency department. Pt does not appear septic and has not been hypotensive.     Plan  - Daily RFP  - Urinalysis  - FEUrea d/t bumetanide  - RP U/S for hydronephrosis  - Renally dose medications  - Avoid nephrotoxic agents  - Avoid relative hypotension   - Aim for SBP between 120 and 135 mmHg  - Strict I's/O's  - Daily weights -- will be difficult as she is wheelchair bound       (HFpEF) heart failure with preserved ejection fraction    Appear stable. No HANDY or orthopnea. JVD did not appear elevated. Last Echo performed in 2014 only revealed diastolic dysfunction without reduction in EF. Tolerates home diuretic regimen.    Plan  - Bumetanide 2mg qd       Uncontrolled type 2 diabetes mellitus with stage 3 chronic kidney disease, with long-term current use of insulin    HYPERGLYCEMIA    Initial blood glucose on arrival was 295 mg/dL. Home regimen consists of glimeperide and nightly insulin lantus 35 units. Most recent A1c 8/2018 was 10.6%.     Plan  - Target -180 mg/dL  - Detemir QHS 15 units  - Moderate SSI   - A1c       Diabetic peripheral neuropathy associated with type 2 diabetes mellitus    Pt has peripheral neuropathy characterized by loss of sensation rather than paresthesia and chronic pain. Likely contributed to her chronic wounds.        Essential hypertension    Pt states she takes both diuretic medications, HCTZ and bumex. She is unsure if she takes her lisinopril any longer. Mildly hypertensive when she arrived in the ED.     Plan  - Ask pharmacy for med rec  - Continue bumetinide 2mg   - Hold HCTZ 25 mg -- consider discontinuation as she is now CKD3       Anemia of chronic disease    Pt with established diagnosis of anemia of chronic disease. Baseline Hgb of 10.6 to 11.5 g/dL. Currently at baseline.            Alkaline phosphatase elevation    HYPERBILIRUBINEMIA    Alkaline phosphatase elevation with elevation in bilirubin in pt presenting with episode of nausea and emesis.      Plan  - CMP  - Abdominal ultrasound        Hx of transient ischemic attack (TIA)    HYPERLIPIDEMIA    H/o TIA on DAPT with aspirin and clopidogrel. TIA was years ago. May need outpatient follow up with vascular neurologist.     - C/w aspirin 81 mg   - C/w atorvastatin 40 mg   - C/w clopidogrel 75 mg     Hypoalbuminemia    Baseline ranges from 2.6 to 3.0 will continue to monitor       Hyponatremia    Presented with sodium of 129 mmol/L in setting of hyperglycemia. Corrected to 134 mmol/L       Morbid obesity with BMI of 40.0-44.9, adult    Diabetic 2000 calorie diet       Weakness    WHEELCHAIR DEPENDENT    - PT/OT       Goals of care, counseling/discussion    After CODE status discussion with patient and daughter at bedside regarding risks and goals of advanced resuscitation, pt wishes to be made DNR.        Hyperlipidemia LDL goal <100             VTE Risk Mitigation (From admission, onward)        Ordered     heparin (porcine) injection 5,000 Units  Every 8 hours      03/22/19 2150     IP VTE HIGH RISK PATIENT  Once      03/22/19 2150             Behram Khan, MD  Department of Hospital Medicine   [facility]

## 2019-03-23 NOTE — HPI
75 yo F w PMHx significant for nonischemic cardiomyopathy resulting in HFpEF, chronic venous stasis resulting in dermatitis and chronic wounds affecting lower extremities, uncontrolled diabetes, HTN, h/o TIA, neuropathy and CKD 3 (baseline Cr of 1.4-1.6)     Pt presents for evaluation of left heel pain of two day duration. Pain had 10/10 intensity and came on abruptly while seated in her wheelchair. Pain felt like deep pressure on the left heel underneath one of her chronic wounds. Pain is exacerbated by transfers from her wheelchair. She cannot think of what may have caused this pain as she denies trauma to the area and even takes care to transfer on inverted feet so as not to apply direct pressure on the wound. She reports clear drainage from the wound, but states this has been an ongoing issue and is not new. She visits with wound care Nurse Lyle to receive treatment of multiple wounds and ulcers on bilateral lower extremities. She states she is adherent to the regiment prescribed and has been receiving home visits from skilled nursing three times per week. Despite this, she unfortunately has developed a new ulcer on her right leg.      She denies fever, purulent drainage or recent weight loss. Review of systems was positive for nausea with one episode of self-induced vomiting which resolved. Other than transfers, pt does not ambulate.    MRI was done and could not rule out osteo

## 2019-03-23 NOTE — PROGRESS NOTES
Patient transferred to MRI bed,, left foot placed in MRI coil,,, placed in MRI,, tolerating well,, WCTM

## 2019-03-23 NOTE — CONSULTS
[facility]  Podiatry  Consult Note    Patient Name: Sherin Ortiz  MRN: [id number]  Admission Date: 3/22/2019  Hospital Length of Stay: 1 days  Attending Physician: Maty Cohen MD  Primary Care Provider: Ame Vasquez MD     Inpatient consult to Podiatry  Consult performed by: Hyun Hurst MD  Consult ordered by: Behram Khan, MD        Subjective:     History of Present Illness:  Pt 75 y/o female  has a past medical history of Allergy, Asteroid hyalosis - Left Eye, Benign essential hypertension, Cataract, Chronic kidney disease (CKD), stage III (moderate), Diabetic peripheral neuropathy associated with type 2 diabetes mellitus, Gait disorder, Hyperlipidemia, Iritis - Both Eyes, Kidney stone, Lymphedema, Morbid obesity with BMI of 40.0-44.9, adult, Nephrolithiasis, NS (nuclear sclerosis), Nuclear sclerosis - Both Eyes, Preseptal cellulitis - Right Eye, Proliferative diabetic retinopathy - Both Eyes, Proliferative diabetic retinopathy, both eyes, PSC (posterior subcapsular cataract) - Both Eyes, S/P hernia repair, TIA (transient ischemic attack), Tinea pedis, Type 2 diabetes mellitus with renal manifestations, controlled, Type 2 diabetes, controlled, with moderate nonproliferative diabetic retinopathy without macular edema, Ulcer of left lower extremity, limited to breakdown of skin, Unspecified cerebral artery occlusion with cerebral infarction, Unspecified venous (peripheral) insufficiency, Ureteral stone with hydronephrosis, UTI (lower urinary tract infection), Vaginal infection, and Vertical heterotropia - Both Eyes.     Admitted and consulted to podiatry for wounds with cellulitis. Pt seen today, states follows as outpt with wound care. Relates to pain to her anterior leg wounds. No other pedal complaints at this time.    Scheduled Meds:   aspirin  81 mg Oral Daily    atorvastatin  40 mg Oral QHS    clopidogrel  75 mg Oral Daily    heparin (porcine)  5,000 Units  Subcutaneous Q8H    insulin detemir U-100  15 Units Subcutaneous QHS    meropenem (MERREM) IVPB  1 g Intravenous Q12H    miconazole   Topical (Top) BID    vancomycin (VANCOCIN) IVPB  1,750 mg Intravenous Q24H     Continuous Infusions:  PRN Meds:acetaminophen, acetaminophen, dextrose 50%, dextrose 50%, diphenhydrAMINE, glucagon (human recombinant), glucose, glucose, insulin aspart U-100, ramelteon, sodium chloride 0.9%    Review of patient's allergies indicates:   Allergen Reactions    Penicillins Hives     Other reaction(s): Hives    Sulfa (sulfonamide antibiotics) Other (See Comments)     Eyad, pt states her doctor told her the shakes were possibly caused by an allergy to sulfa        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    Benign essential hypertension 11/14/2012    Cataract     s/p phacoemulsification    Chronic kidney disease (CKD), stage III (moderate) 9/12/2013    Diabetic peripheral neuropathy associated with type 2 diabetes mellitus 11/14/2014    causing right hemiparesis    Gait disorder     Hyperlipidemia     Iritis - Both Eyes 6/10/2013    Kidney stone     Lymphedema     Morbid obesity with BMI of 40.0-44.9, adult 2/18/2015    Nephrolithiasis 4/20/2016    NS (nuclear sclerosis) 4/1/2013    Nuclear sclerosis - Both Eyes 4/29/2013    Preseptal cellulitis - Right Eye 4/29/2013    Proliferative diabetic retinopathy - Both Eyes 4/29/2013    Proliferative diabetic retinopathy, both eyes 4/1/2013    PSC (posterior subcapsular cataract) - Both Eyes 4/29/2013    S/P hernia repair 12/19/2012    TIA (transient ischemic attack) 11/18/2014    Tinea pedis 7/24/2012    Tinea pedis is present on both feet.     Type 2 diabetes mellitus with renal manifestations, controlled 12/12/2013    Type 2 diabetes, controlled, with moderate nonproliferative diabetic retinopathy without macular edema 9/17/2015    Ulcer of left lower extremity,  limited to breakdown of skin 7/8/2015    Unspecified cerebral artery occlusion with cerebral infarction 11/16/2014    Unspecified venous (peripheral) insufficiency     Ureteral stone with hydronephrosis 1/27/2016    UTI (lower urinary tract infection)     Vaginal infection     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     Past Surgical History:   Procedure Laterality Date    APPENDECTOMY      CATARACT EXTRACTION W/  INTRAOCULAR LENS IMPLANT Left 5/21/2013    CATARACT EXTRACTION W/  INTRAOCULAR LENS IMPLANT Right 6/4/2013    CHOLECYSTECTOMY      COLONOSCOPY  12/22/2005    normal    CYSTOSCOPY  4/20/2016    Performed by [facility] at [facility] OR    CYSTOSCOPY WITH STENT PLACEMENT Right 1/27/2016    [facility] by [facility] at [facility] OR    ESOPHAGOGASTRODUODENOSCOPY  12/21/2015    hiatal hernia, Schatzki ring    ESOPHAGOGASTRODUODENOSCOPY (EGD) N/A 6/30/2014    [facility]formed [facility] at [facility] ENDO (2ND FLR)    EYE SURGERY Bilateral 2008    laser surgery both eyes    INSERTION, IOL PROSTHESIS Right 6/4/2013    Performed by Federico Schwartz [facility] at [facility] OR 1ST FLR    INSERTION, IOL PROSTHESIS Left 5/21/2013    Performed by [facility] at [facility] OR 1ST FLR    NASAL SEPTUM SURGERY      PHACOEMULSIFICATION, CATARACT Right 6/4/2013    Performed by [facility] at [facility] OR 1ST FLR    PHACOEMULSIFICATION, CATARACT Left 5/21/2013    Performed by [facility] at [facility] OR 1ST FLR    PYELOGRAM-RETROGRADE Right 4/20/2016    Per[facility] by [facility] at [facility] OR    REMOVAL-STENT-URETERAL Right 4/20/2016    Performed by [facility] at [facility] OR    SUBTOTAL COLECTOMY  12/13/2012    transverse colon, for incarcerated umbilical hernia, Dr. Kat Bower    URETEROSCOPY Right 4/20/2016    Performed by [facility] at [facility] OR       Family History     Problem Relation (Age of Onset)    Cataracts Sister, Brother     Diabetes Sister    Heart disease Brother    Leukemia Mother        Tobacco Use    Smoking status: Former Smoker     Packs/day: 0.50     Years: 15.00     Pack years: 7.50     Types: Cigarettes     Last attempt to quit: 1982     Years since quittin.7    Smokeless tobacco: Former User    Tobacco comment: smoked one pack per week   Substance and Sexual Activity    Alcohol use: No    Drug use: No    Sexual activity: Not Currently     Review of Systems   Constitutional: Negative for chills, fatigue and fever.   Respiratory: Negative for shortness of breath.    Cardiovascular: Positive for leg swelling. Negative for chest pain.   Gastrointestinal: Negative for nausea and vomiting.   Skin: Positive for color change and wound.   Neurological: Positive for numbness.   Psychiatric/Behavioral: Negative for agitation.     Objective:     Vital Signs (Most Recent):  Temp: (P) 97 °F (36.1 °C) (19 1524)  Pulse: (P) 78 (19 1524)  Resp: (P) 16 (19 1524)  BP: (!) (P) 142/63 (19 1524)  SpO2: (P) 97 % (19 1524) Vital Signs (24h Range):  Temp:  [97 °F (36.1 °C)-98.7 °F (37.1 °C)] (P) 97 °F (36.1 °C)  Pulse:  [78-92] (P) 78  Resp:  [14-24] (P) 16  SpO2:  [95 %-99 %] (P) 97 %  BP: (101-167)/(52-72) (P) 142/63     Weight: (!) 143.8 kg (317 lb)  Body mass index is 49.65 kg/m².    Foot Exam    General  General Appearance: appears stated age and healthy   Orientation: alert and oriented to person, place, and time   Affect: appropriate       Right Foot/Ankle     Inspection and Palpation  Ecchymosis: dorsum  Swelling: dorsum   Skin Exam: cellulitis, ulcer and erythema; no drainage     Neurovascular  Dorsalis pedis: 1+  Posterior tibial: absent  Saphenous nerve sensation: diminished  Tibial nerve sensation: diminished  Superficial peroneal nerve sensation: diminished  Deep peroneal nerve sensation: diminished  Sural nerve sensation: diminished      Left Foot/Ankle      Inspection and  Palpation  Ecchymosis: dorsum  Swelling: dorsum   Skin Exam: cellulitis, ulcer and erythema; no drainage     Neurovascular  Dorsalis pedis: 1+  Posterior tibial: absent  Saphenous nerve sensation: diminished  Tibial nerve sensation: diminished  Superficial peroneal nerve sensation: diminished  Deep peroneal nerve sensation: diminished  Sural nerve sensation: diminished            Laboratory:  A1C:   Recent Labs   Lab 03/22/19  1744   HGBA1C 9.8*     Blood Cultures:   Recent Labs   Lab 03/22/19  1755 03/22/19 1757   LABBLOO No Growth to date No Growth to date     ESR:   Recent Labs   Lab 03/22/19  1744   SEDRATE 90*     Prealbumin: No results for input(s): PREALBUMIN in the last 48 hours.  Wound Cultures:   Recent Labs   Lab 11/21/18  1120   LABAERO PSEUDOMONAS AERUGINOSA  Moderate    KLEBSIELLA OXYTOCA  Few    PROTEUS MIRABILIS  Moderate       Microbiology Results (last 7 days)     Procedure Component Value Units Date/Time    Culture, Anaerobe [[id number]] Collected:  03/23/19 1119    Order Status:  Sent Specimen:  Wound from Foot, Left Updated:  03/23/19 1217    Aerobic culture [[id number]] Collected:  03/23/19 1119    Order Status:  Sent Specimen:  Wound from Foot, Left Updated:  03/23/19 1217    Blood culture #2 **CANNOT BE ORDERED STAT** [[id number]] Collected:  03/22/19 1757    Order Status:  Completed Specimen:  Blood from Peripheral, Forearm, Right Updated:  03/23/19 0715     Blood Culture, Routine No Growth to date    Blood culture #1 **CANNOT BE ORDERED STAT** [[id number]] Collected:  03/22/19 1755    Order Status:  Completed Specimen:  Blood from Peripheral, Hand, Right Updated:  03/23/19 0715     Blood Culture, Routine No Growth to date    Aerobic culture [[id number]] Collected:  03/22/19 2200    Order Status:  Sent Specimen:  Wound from Foot, Left Updated:  03/22/19 2357    Aerobic culture [[id number]] Collected:  03/22/19 2200    Order Status:  Sent Specimen:  Wound from Toe, Left Foot Updated:  03/22/19  2356    Aerobic culture [[id number]] Collected:  03/22/19 2200    Order Status:  Sent Specimen:  Wound from Foot, Left Updated:  03/22/19 2356    Urine culture [[id number]] Collected:  03/22/19 2200    Order Status:  No result Specimen:  Urine Updated:  03/22/19 2350        Specimen (12h ago, onward)    None          Diagnostic Results:  MRI: I have reviewed all pertinent results/findings within the past 24 hours.  reviewed  X-Ray: I have reviewed all pertinent results/findings within the past 24 hours.  reviewed  I have reviewed all pertinent imaging results/findings within the past 24 hours.    Clinical Findings:    Multiple superficial granular wounds noted to yessenia LE with periwound erythema and edema noted. Wounds do not probe to bone. No malodor or purulent drainage noted. No clinical signs of abscess noted. Wound to left heel does not probe to bone. Cellulitis noted to left heel and ankle.                               Assessment/Plan:     * Cellulitis    As above  ABX per ID, appreciate recs  Cultures taken, recommend 2-3 week course of ABX for cellulitis      Acute renal failure superimposed on stage 3 chronic kidney disease    Per primary     Ulcer of left heel and midfoot, limited to breakdown of skin    Wound to left heel with cellulitis noted, does not probe deep  Imaging reviewed, no evidence of clinical OM noted  Deep cultures taken, orders placed  Recommend 2-3 week course of ABX for cellulitis  Venous US ordered to r/o DVT  Wounds dressed with betadine, abd pad, kerlix, ACE  Nursing orders in to perform daily dressing changes  Recommend elevation  Upon D/C to f/u with wound care  Podiatry will follow     Uncontrolled type 2 diabetes mellitus with stage 3 chronic kidney disease, with long-term current use of insulin    Per primary     Dermatitis, stasis    Per primary     Venous insufficiency of leg    Per primary     Wheelchair dependent    Per primary     Diabetic peripheral neuropathy associated with  type 2 diabetes mellitus    Per primary     Morbid obesity with BMI of 40.0-44.9, adult    Per primary         Thank you for your consult. I will follow-up with patient. Please contact us if you have any additional questions.    Hyun Hurst MD  Podiatry  [facility]

## 2019-03-23 NOTE — HPI
Ms Ortiz is a 73 yo F w PMHx significant for nonischemic cardiomyopathy resulting in HFpEF, chronic venous stasis resulting in dermatitis and chronic wounds affecting lower extremities, uncontrolled diabetes, HTN, h/o TIA, neuropathy and CKD 3 (baseline Cr of 1.4-1.6)    Pt presents for evaluation of left heel pain of two day duration. Pain had 10/10 intensity and came on abruptly while seated in her wheelchair. Pain felt like deep pressure on the left heel underneath one of her chronic wounds. Pain is exacerbated by transfers from her wheelchair. She cannot think of what may have caused this pain as she denies trauma to the area and even takes care to transfer on inverted feet so as not to apply direct pressure on the wound. She reports clear drainage from the wound, but states this has been an ongoing issue and is not new. She visits with wound care Nurse Lyle to receive treatment of multiple wounds and ulcers on bilateral lower extremities. She states she is adherent to the regiment prescribed and has been receiving home visits from skilled nursing three times per week. Despite this, she unfortunately has developed a new ulcer on her right leg.     She denies fever, purulent drainage or recent weight loss. Review of systems was positive for nausea with one episode of self-induced vomiting which resolved. Other than transfers, pt does not ambulate.

## 2019-03-23 NOTE — HPI
Pt 75 y/o female  has a past medical history of Allergy, Asteroid hyalosis - Left Eye, Benign essential hypertension, Cataract, Chronic kidney disease (CKD), stage III (moderate), Diabetic peripheral neuropathy associated with type 2 diabetes mellitus, Gait disorder, Hyperlipidemia, Iritis - Both Eyes, Kidney stone, Lymphedema, Morbid obesity with BMI of 40.0-44.9, adult, Nephrolithiasis, NS (nuclear sclerosis), Nuclear sclerosis - Both Eyes, Preseptal cellulitis - Right Eye, Proliferative diabetic retinopathy - Both Eyes, Proliferative diabetic retinopathy, both eyes, PSC (posterior subcapsular cataract) - Both Eyes, S/P hernia repair, TIA (transient ischemic attack), Tinea pedis, Type 2 diabetes mellitus with renal manifestations, controlled, Type 2 diabetes, controlled, with moderate nonproliferative diabetic retinopathy without macular edema, Ulcer of left lower extremity, limited to breakdown of skin, Unspecified cerebral artery occlusion with cerebral infarction, Unspecified venous (peripheral) insufficiency, Ureteral stone with hydronephrosis, UTI (lower urinary tract infection), Vaginal infection, and Vertical heterotropia - Both Eyes.     Admitted and consulted to podiatry for wounds with cellulitis. Pt seen today, states follows as outpt with wound care. Relates to pain to her anterior leg wounds. No other pedal complaints at this time.

## 2019-03-23 NOTE — CONSULTS
[facility]  Infectious Disease  Consult Note    Patient Name: Sherin Ortiz  MRN: [id number]  Admission Date: 3/22/2019  Hospital Length of Stay: 1 days  Attending Physician: Maty Cohen MD  Primary Care Provider: Ame Vasquez MD     Isolation Status: No active isolations    Patient information was obtained from patient and past medical records.      Consults  Assessment/Plan:     Ulcer of left heel and midfoot, limited to breakdown of skin    Concern for osteo. MRI could not rule it out    -would pursue a bone biopsy with path and culture  -agree with holding antibiotics for now         Thank you for your consult. I will follow-up with patient. Please contact us if you have any additional questions.    Jacob Lai MD  Infectious Disease  [facility]    Subjective:     Principal Problem: Cellulitis    HPI: 75 yo F w PMHx significant for nonischemic cardiomyopathy resulting in HFpEF, chronic venous stasis resulting in dermatitis and chronic wounds affecting lower extremities, uncontrolled diabetes, HTN, h/o TIA, neuropathy and CKD 3 (baseline Cr of 1.4-1.6)     Pt presents for evaluation of left heel pain of two day duration. Pain had 10/10 intensity and came on abruptly while seated in her wheelchair. Pain felt like deep pressure on the left heel underneath one of her chronic wounds. Pain is exacerbated by transfers from her wheelchair. She cannot think of what may have caused this pain as she denies trauma to the area and even takes care to transfer on inverted feet so as not to apply direct pressure on the wound. She reports clear drainage from the wound, but states this has been an ongoing issue and is not new. She visits with wound care Nurse Lyle to receive treatment of multiple wounds and ulcers on bilateral lower extremities. She states she is adherent to the regiment prescribed and has been receiving home visits from skilled nursing three times per week.  Despite this, she unfortunately has developed a new ulcer on her right leg.      She denies fever, purulent drainage or recent weight loss. Review of systems was positive for nausea with one episode of self-induced vomiting which resolved. Other than transfers, pt does not ambulate.    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    Benign essential hypertension 11/14/2012    Cataract     s/p phacoemulsification    Chronic kidney disease (CKD), stage III (moderate) 9/12/2013    Diabetic peripheral neuropathy associated with type 2 diabetes mellitus 11/14/2014    causing right hemiparesis    Gait disorder     Hyperlipidemia     Iritis - Both Eyes 6/10/2013    Kidney stone     Lymphedema     Morbid obesity with BMI of 40.0-44.9, adult 2/18/2015    Nephrolithiasis 4/20/2016    NS (nuclear sclerosis) 4/1/2013    Nuclear sclerosis - Both Eyes 4/29/2013    Preseptal cellulitis - Right Eye 4/29/2013    Proliferative diabetic retinopathy - Both Eyes 4/29/2013    Proliferative diabetic retinopathy, both eyes 4/1/2013    PSC (posterior subcapsular cataract) - Both Eyes 4/29/2013    S/P hernia repair 12/19/2012    TIA (transient ischemic attack) 11/18/2014    Tinea pedis 7/24/2012    Tinea pedis is present on both feet.     Type 2 diabetes mellitus with renal manifestations, controlled 12/12/2013    Type 2 diabetes, controlled, with moderate nonproliferative diabetic retinopathy without macular edema 9/17/2015    Ulcer of left lower extremity, limited to breakdown of skin 7/8/2015    Unspecified cerebral artery occlusion with cerebral infarction 11/16/2014    Unspecified venous (peripheral) insufficiency     Ureteral stone with hydronephrosis 1/27/2016    UTI (lower urinary tract infection)     Vaginal infection     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       Past Surgical History:   Procedure  Laterality Date    APPENDECTOMY      CATARACT EXTRACTION W/  INTRAOCULAR LENS IMPLANT Left 5/21/2013    CATARACT EXTRACTION W/  INTRAOCULAR LENS IMPLANT Right 6/4/2013    CHOLECYSTECTOMY      COLONOSCOPY  12/22/2005    normal    CYSTOSCOPY  4/20/2016    [facility]    CYSTOSCOPY WITH STENT PLACEMENT Right 1/27/2016    [facility]    ESOPHAGOGASTRODUODENOSCOPY  12/21/2015    hiatal hernia, Schatzki ring    ESOPHAGOGASTRODUODENOSCOPY (EGD) N/A 6/30/2014    Per[facility] (2ND FLR)    EYE SURGERY Bilateral 2008    laser surgery both eyes    INSERTION, IOL PROSTHESIS Right 6/4/2013    Performed by [facility] at [facility] 1ST FLR    INSERTION, IOL PROSTHESIS Left 5/21/2013    [facility] at [facility] 1ST FLR    NASAL SEPTUM SURGERY      PHACOEMULSIFICATION, CATARACT Right 6/4/2013    [facility] at [facility] 1ST FLR    PHACOEMULSIFICATION, CATARACT Left 5/21/2013    [facility] 1ST FLR    PYELOGRAM-RETROGRADE Right 4/20/2016    [facility]    REMOVAL-STENT-URETERAL Right 4/20/2016    [facility]    SUBTOTAL COLECTOMY  12/13/2012    transverse colon, for incarcerated umbilical hernia, Dr. Kat Trujillo-Katie    URETEROSCOPY Right 4/20/2016    [facility]       Review of patient's allergies indicates:   Allergen Reactions    Penicillins Hives     Other reaction(s): Hives    Sulfa (sulfonamide antibiotics) Other (See Comments)     Shakes, pt states her doctor told her the shakes were possibly caused by an allergy to sulfa       Medications:  Medications Prior to Admission   Medication Sig    ACCU-CHEK RAMIRO PLUS TEST STRP Strp TEST TWICE DAILY    ACCU-CHEK SOFTCLIX LANCETS Misc Test twice daily     "acetaminophen 500 mg/15 mL Liqd     aspirin 81 MG Chew Take 81 mg by mouth once daily.      atorvastatin (LIPITOR) 40 MG tablet TAKE 1 TABLET EVERY EVENING    BD INSULIN SYRINGE ULTRA-FINE 1/2 mL 30 gauge x 1/2" Syrg USE EVERY NIGHT    bumetanide (BUMEX) 1 MG tablet Take 2 tablets (2 mg total) by mouth once daily.    CALCIUM CARBONATE ORAL     clopidogrel (PLAVIX) 75 mg tablet TAKE 1 TABLET EVERY DAY    diclofenac sodium 1 % Gel Apply 2 g topically nightly as needed. (Patient taking differently: Apply 2 g topically nightly as needed (pain). )    ergocalciferol (ERGOCALCIFEROL) 50,000 unit Cap Take 1 capsule (50,000 Units total) by mouth every 7 days.    fluconazole (DIFLUCAN) 200 MG Tab     gentamicin (GARAMYCIN) 0.1 % ointment Apply topically once daily.    glimepiride (AMARYL) 1 MG tablet Take 2 tablets (2 mg total) by mouth with lunch.    hydroCHLOROthiazide (HYDRODIURIL) 25 MG tablet Take 1 tablet (25 mg total) by mouth once daily.    ibuprofen (ADVIL,MOTRIN) 200 MG tablet     ketoconazole (NIZORAL) 2 % cream Apply topically once daily.    LANTUS U-100 INSULIN 100 unit/mL injection INJECT 7 TO 10 UNITS SUBCUTANEOUSLY IN THE EVENING DEPENDING ON SCALE (DISCARD EACH VIAL AFTER 28 DAYS)    lisinopril 10 MG tablet     Menthol/Camphor/Phen/Salicylic (LIP BALM MEDICATED TOP)     triamcinolone acetonide 0.1% (KENALOG) 0.1 % cream Apply topically 2 (two) times daily.     Antibiotics (From admission, onward)    None        Antifungals (From admission, onward)    Start     Stop Route Frequency Ordered    03/22/19 2215  miconazole 2 % cream      -- Top 2 times daily 03/22/19 2114        Antivirals (From admission, onward)    None           Immunization History   Administered Date(s) Administered    Influenza 12/01/2008, 02/02/2010, 09/27/2010, 09/26/2011    Influenza - High Dose 11/14/2012, 09/12/2013, 11/07/2014, 09/30/2015, 11/08/2016, 10/09/2017    Pneumococcal Conjugate - 13 Valent 02/16/2016, " 12/15/2017    Pneumococcal Polysaccharide - 23 Valent 12/10/2008    Tdap 12/15/2016       Family History     Problem Relation (Age of Onset)    Cataracts Sister, Brother    Diabetes Sister    Heart disease Brother    Leukemia Mother        Social History     Socioeconomic History    Marital status:      Spouse name: None    Number of children: None    Years of education: None    Highest education level: None   Social Needs    Financial resource strain: None    Food insecurity - worry: None    Food insecurity - inability: None    Transportation needs - medical: None    Transportation needs - non-medical: None   Occupational History    None   Tobacco Use    Smoking status: Former Smoker     Packs/day: 0.50     Years: 15.00     Pack years: 7.50     Types: Cigarettes     Last attempt to quit: 1982     Years since quittin.7    Smokeless tobacco: Former User    Tobacco comment: smoked one pack per week   Substance and Sexual Activity    Alcohol use: No    Drug use: No    Sexual activity: Not Currently   Other Topics Concern    None   Social History Narrative    None     Review of Systems   Constitutional: Negative for fever and unexpected weight change.   HENT: Negative for ear pain, sinus pressure, sneezing and sore throat.    Eyes: Negative for pain and visual disturbance.   Respiratory: Negative for cough, chest tightness, shortness of breath and wheezing.    Cardiovascular: Negative for chest pain, palpitations and leg swelling.   Gastrointestinal: Positive for nausea and vomiting. Negative for abdominal pain, constipation and diarrhea.   Endocrine: Negative for polydipsia and polyuria.   Genitourinary: Negative for decreased urine volume, difficulty urinating, dysuria and urgency.   Musculoskeletal: Negative for arthralgias and myalgias.   Skin: Positive for wound. Negative for color change and rash.   Allergic/Immunologic: Negative for environmental allergies and food allergies.    Neurological: Positive for weakness. Negative for dizziness, syncope, light-headedness and headaches.   Psychiatric/Behavioral: Negative for confusion and dysphoric mood. The patient is not nervous/anxious.      Objective:     Vital Signs (Most Recent):  Temp: 97.8 °F (36.6 °C) (03/23/19 1231)  Pulse: 79 (03/23/19 1231)  Resp: 14 (03/23/19 1231)  BP: (!) 131/56 (03/23/19 1231)  SpO2: 95 % (03/23/19 1231) Vital Signs (24h Range):  Temp:  [97.5 °F (36.4 °C)-98.7 °F (37.1 °C)] 97.8 °F (36.6 °C)  Pulse:  [78-92] 79  Resp:  [14-24] 14  SpO2:  [95 %-99 %] 95 %  BP: (101-167)/(52-72) 131/56     Weight: (!) 143.8 kg (317 lb)  Body mass index is 49.65 kg/m².    Estimated Creatinine Clearance: 44.6 mL/min (A) (based on SCr of 1.6 mg/dL (H)).    Physical Exam   Constitutional: She is oriented to person, place, and time. She appears well-developed and well-nourished.   Body mass index is 49.65 kg/m².     HENT:   Head: Normocephalic and atraumatic.   Eyes: EOM are normal. Pupils are equal, round, and reactive to light. No scleral icterus.   Neck: Normal range of motion. Neck supple.   Cardiovascular: Normal rate, regular rhythm, normal heart sounds and intact distal pulses.   No murmur heard.  Pulmonary/Chest: Effort normal and breath sounds normal. No respiratory distress. She has no wheezes.   Abdominal: Soft. Bowel sounds are normal. She exhibits no distension. There is no tenderness.   Musculoskeletal:        Right lower leg: She exhibits edema.        Left lower leg: She exhibits edema.   Venous stasis dermatitis with wounds present.    Neurological: She is alert and oriented to person, place, and time.   Skin: Skin is warm and dry.   Psychiatric: She has a normal mood and affect. Her behavior is normal.   Vitals reviewed.      Significant Labs: All pertinent labs within the past 24 hours have been reviewed.    Significant Imaging: I have reviewed all pertinent imaging results/findings within the past 24  hours.

## 2019-03-23 NOTE — ED NOTES
Hourly rounding performed at this time. Daughter at bedside. Patient is in bed awake and alert, resting comfortably. No pain, acute distress or discomfort reported or observed. Assisted patient to reposition for comfort. Discussed care plan, patient denies any additional needs at this time and has no complaints or questions. Room assessed for safety measures and cleanliness, no action needed at this time. The bed is in low, locked position with side rails up x 2. Personal belongings and call light in reach. Patient is oriented to call light use. Patient verbalizes will call nurse if assistance is needed.

## 2019-03-23 NOTE — SUBJECTIVE & OBJECTIVE
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    Benign essential hypertension 11/14/2012    Cataract     s/p phacoemulsification    Chronic kidney disease (CKD), stage III (moderate) 9/12/2013    Diabetic peripheral neuropathy associated with type 2 diabetes mellitus 11/14/2014    causing right hemiparesis    Gait disorder     Hyperlipidemia     Iritis - Both Eyes 6/10/2013    Kidney stone     Lymphedema     Morbid obesity with BMI of 40.0-44.9, adult 2/18/2015    Nephrolithiasis 4/20/2016    NS (nuclear sclerosis) 4/1/2013    Nuclear sclerosis - Both Eyes 4/29/2013    Preseptal cellulitis - Right Eye 4/29/2013    Proliferative diabetic retinopathy - Both Eyes 4/29/2013    Proliferative diabetic retinopathy, both eyes 4/1/2013    PSC (posterior subcapsular cataract) - Both Eyes 4/29/2013    S/P hernia repair 12/19/2012    TIA (transient ischemic attack) 11/18/2014    Tinea pedis 7/24/2012    Tinea pedis is present on both feet.     Type 2 diabetes mellitus with renal manifestations, controlled 12/12/2013    Type 2 diabetes, controlled, with moderate nonproliferative diabetic retinopathy without macular edema 9/17/2015    Ulcer of left lower extremity, limited to breakdown of skin 7/8/2015    Unspecified cerebral artery occlusion with cerebral infarction 11/16/2014    Unspecified venous (peripheral) insufficiency     Ureteral stone with hydronephrosis 1/27/2016    UTI (lower urinary tract infection)     Vaginal infection     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       Past Surgical History:   Procedure Laterality Date    APPENDECTOMY      CATARACT EXTRACTION W/  INTRAOCULAR LENS IMPLANT Left 5/21/2013    CATARACT EXTRACTION W/  INTRAOCULAR LENS IMPLANT Right 6/4/2013    CHOLECYSTECTOMY      COLONOSCOPY  12/22/2005    normal    CYSTOSCOPY  4/20/2016    [facility]    CYSTOSCOPY WITH STENT  "PLACEMENT Right 1/27/2016    [facility]    ESOPHAGOGASTRODUODENOSCOPY  12/21/2015    hiatal hernia, Schatzki ring    ESOPHAGOGASTRODUODENOSCOPY (EGD) N/A 6/30/2014    Performed [facility] ENDO (2ND FLR)    EYE SURGERY Bilateral 2008    laser surgery both eyes    INSERTION, IOL PROSTHESIS Right 6/4/2013    [facility]formed [facility] OR 1ST FLR    INSERTION, IOL PROSTHESIS Left 5/21/2013    [facility] OR 1ST FLR    NASAL SEPTUM SURGERY      PHACOEMULSIFICATION, CATARACT Right 6/4/2013    [facility] 1ST FLR    PHACOEMULSIFICATION, CATARACT Left 5/21/2013    [facility] 1ST FLR    PYELOGRAM-RETROGRADE Right 4/20/2016    [facility]    REMOVAL-STENT-URETERAL Right 4/20/2016    [facility] OR    SUBTOTAL COLECTOMY  12/13/2012    transverse colon, for incarcerated umbilical hernia, Dr. Kat Bower    URETEROSCOPY Right 4/20/2016    Performed by [facility] OR       Review of patient's allergies indicates:   Allergen Reactions    Penicillins Hives     Other reaction(s): Hives    Sulfa (sulfonamide antibiotics) Other (See Comments)     Shakes, pt states her doctor told her the shakes were possibly caused by an allergy to sulfa       Medications:  Medications Prior to Admission   Medication Sig    ACCU-CHEK RAMIRO PLUS TEST STRP Strp TEST TWICE DAILY    ACCU-CHEK SOFTCLIX LANCETS Misc Test twice daily    acetaminophen 500 mg/15 mL Liqd     aspirin 81 MG Chew Take 81 mg by mouth once daily.      atorvastatin (LIPITOR) 40 MG tablet TAKE 1 TABLET EVERY EVENING    BD INSULIN SYRINGE ULTRA-FINE 1/2 mL 30 gauge x 1/2" Syrg USE EVERY NIGHT    bumetanide (BUMEX) 1 MG tablet Take 2 tablets (2 mg total) by mouth once daily.    CALCIUM " CARBONATE ORAL     clopidogrel (PLAVIX) 75 mg tablet TAKE 1 TABLET EVERY DAY    diclofenac sodium 1 % Gel Apply 2 g topically nightly as needed. (Patient taking differently: Apply 2 g topically nightly as needed (pain). )    ergocalciferol (ERGOCALCIFEROL) 50,000 unit Cap Take 1 capsule (50,000 Units total) by mouth every 7 days.    fluconazole (DIFLUCAN) 200 MG Tab     gentamicin (GARAMYCIN) 0.1 % ointment Apply topically once daily.    glimepiride (AMARYL) 1 MG tablet Take 2 tablets (2 mg total) by mouth with lunch.    hydroCHLOROthiazide (HYDRODIURIL) 25 MG tablet Take 1 tablet (25 mg total) by mouth once daily.    ibuprofen (ADVIL,MOTRIN) 200 MG tablet     ketoconazole (NIZORAL) 2 % cream Apply topically once daily.    LANTUS U-100 INSULIN 100 unit/mL injection INJECT 7 TO 10 UNITS SUBCUTANEOUSLY IN THE EVENING DEPENDING ON SCALE (DISCARD EACH VIAL AFTER 28 DAYS)    lisinopril 10 MG tablet     Menthol/Camphor/Phen/Salicylic (LIP BALM MEDICATED TOP)     triamcinolone acetonide 0.1% (KENALOG) 0.1 % cream Apply topically 2 (two) times daily.     Antibiotics (From admission, onward)    None        Antifungals (From admission, onward)    Start     Stop Route Frequency Ordered    03/22/19 2215  miconazole 2 % cream      -- Top 2 times daily 03/22/19 2114        Antivirals (From admission, onward)    None           Immunization History   Administered Date(s) Administered    Influenza 12/01/2008, 02/02/2010, 09/27/2010, 09/26/2011    Influenza - High Dose 11/14/2012, 09/12/2013, 11/07/2014, 09/30/2015, 11/08/2016, 10/09/2017    Pneumococcal Conjugate - 13 Valent 02/16/2016, 12/15/2017    Pneumococcal Polysaccharide - 23 Valent 12/10/2008    Tdap 12/15/2016       Family History     Problem Relation (Age of Onset)    Cataracts Sister, Brother    Diabetes Sister    Heart disease Brother    Leukemia Mother        Social History     Socioeconomic History    Marital status:      Spouse name: None     Number of children: None    Years of education: None    Highest education level: None   Social Needs    Financial resource strain: None    Food insecurity - worry: None    Food insecurity - inability: None    Transportation needs - medical: None    Transportation needs - non-medical: None   Occupational History    None   Tobacco Use    Smoking status: Former Smoker     Packs/day: 0.50     Years: 15.00     Pack years: 7.50     Types: Cigarettes     Last attempt to quit: 1982     Years since quittin.7    Smokeless tobacco: Former User    Tobacco comment: smoked one pack per week   Substance and Sexual Activity    Alcohol use: No    Drug use: No    Sexual activity: Not Currently   Other Topics Concern    None   Social History Narrative    None     Review of Systems   Constitutional: Negative for fever and unexpected weight change.   HENT: Negative for ear pain, sinus pressure, sneezing and sore throat.    Eyes: Negative for pain and visual disturbance.   Respiratory: Negative for cough, chest tightness, shortness of breath and wheezing.    Cardiovascular: Negative for chest pain, palpitations and leg swelling.   Gastrointestinal: Positive for nausea and vomiting. Negative for abdominal pain, constipation and diarrhea.   Endocrine: Negative for polydipsia and polyuria.   Genitourinary: Negative for decreased urine volume, difficulty urinating, dysuria and urgency.   Musculoskeletal: Negative for arthralgias and myalgias.   Skin: Positive for wound. Negative for color change and rash.   Allergic/Immunologic: Negative for environmental allergies and food allergies.   Neurological: Positive for weakness. Negative for dizziness, syncope, light-headedness and headaches.   Psychiatric/Behavioral: Negative for confusion and dysphoric mood. The patient is not nervous/anxious.      Objective:     Vital Signs (Most Recent):  Temp: 97.8 °F (36.6 °C) (19 1231)  Pulse: 79 (19 1231)  Resp: 14  (03/23/19 1231)  BP: (!) 131/56 (03/23/19 1231)  SpO2: 95 % (03/23/19 1231) Vital Signs (24h Range):  Temp:  [97.5 °F (36.4 °C)-98.7 °F (37.1 °C)] 97.8 °F (36.6 °C)  Pulse:  [78-92] 79  Resp:  [14-24] 14  SpO2:  [95 %-99 %] 95 %  BP: (101-167)/(52-72) 131/56     Weight: (!) 143.8 kg (317 lb)  Body mass index is 49.65 kg/m².    Estimated Creatinine Clearance: 44.6 mL/min (A) (based on SCr of 1.6 mg/dL (H)).    Physical Exam   Constitutional: She is oriented to person, place, and time. She appears well-developed and well-nourished.   Body mass index is 49.65 kg/m².     HENT:   Head: Normocephalic and atraumatic.   Eyes: EOM are normal. Pupils are equal, round, and reactive to light. No scleral icterus.   Neck: Normal range of motion. Neck supple.   Cardiovascular: Normal rate, regular rhythm, normal heart sounds and intact distal pulses.   No murmur heard.  Pulmonary/Chest: Effort normal and breath sounds normal. No respiratory distress. She has no wheezes.   Abdominal: Soft. Bowel sounds are normal. She exhibits no distension. There is no tenderness.   Musculoskeletal:        Right lower leg: She exhibits edema.        Left lower leg: She exhibits edema.   Venous stasis dermatitis with wounds present.    Neurological: She is alert and oriented to person, place, and time.   Skin: Skin is warm and dry.   Psychiatric: She has a normal mood and affect. Her behavior is normal.   Vitals reviewed.      Significant Labs: All pertinent labs within the past 24 hours have been reviewed.    Significant Imaging: I have reviewed all pertinent imaging results/findings within the past 24 hours.

## 2019-03-23 NOTE — PT/OT/SLP EVAL
"Occupational Therapy   Evaluation    Name: Sherin Ortiz  MRN: [id number]  Admitting Diagnosis:  Cellulitis      Recommendations:     Discharge Recommendations: (SNF vs HHOT)  Discharge Equipment Recommendations:  none  Barriers to discharge:  Decreased caregiver support    Assessment:     Sherin Ortiz is a 74 y.o. female with a medical diagnosis of Cellulitis.  She presents with BLE wraps with c/o intense pain upon movement to sit EOB. BLE edema with LLE swelling significantly greater than RLE. Pt initially refusing OT eval stating, "I can't do any of that today." but agreeable to EOB and AROM, MMT. Performance deficits affecting function: weakness, impaired self care skills, impaired balance, decreased ROM, impaired skin, pain, decreased upper extremity function, impaired functional mobilty, impaired endurance, impaired sensation, gait instability, decreased lower extremity function, edema.      Rehab Prognosis: Fair; patient would benefit from acute skilled OT services to address these deficits and reach maximum level of function.       Plan:     Patient to be seen 3 x/week to address the above listed problems via self-care/home management, therapeutic activities, therapeutic exercises  · Plan of Care Expires: 04/02/19  · Plan of Care Reviewed with: patient    Subjective     Chief Complaint: Pt c/o intense pain upon movement BLE  Patient/Family Comments/goals: To return home    Occupational Profile:  Living Environment: Pt lives with grandson and bed bound sister in [facility], ramp access, WIS w/SC  Previous level of function: Mod I for ADLs and functional mobility from w/c level; reports that grandson occasionally assists pt to don/doff shoes and socks  Roles and Routines: Pt reports that she assists in care taking for sister but as sister requires michele lift and significant assist to roll in bed per pt report, it is likely that she is not physically able to assist in t/fs or functional mobility. Pt reports that " grandson assists in caring for pt and pt's sister. She states he cooks meals most days but pt's daughter and granddaughter grocery shop and assist in cooking as well  Equipment Used at Home:  shower chair, power chair, grab bar, bedside commode, raised toilet, walker, rolling  Assistance upon Discharge: Family, unsure if grandson is able to provide adequate level of care at this time as pt is admitted with multiple areas of skin breakdown     Pain/Comfort:  · Pain Rating 1: (did not rate)  · Location - Side 1: Bilateral  · Location - Orientation 1: lower  · Location 1: leg  · Pain Addressed 1: Reposition, Distraction, Cessation of Activity  · Pain Rating Post-Intervention 1: (did not rate)    Patients cultural, spiritual, Jain conflicts given the current situation:      Objective:     Communicated with: melinda prior to session.  Patient found HOB elevated with peripheral IV upon OT entry to room.    General Precautions: Standard, fall   Orthopedic Precautions:N/A   Braces: N/A     Occupational Performance:    Bed Mobility:    · Patient completed Rolling/Turning to Left with  stand by assistance  · Patient completed Rolling/Turning to Right with minimum assistance  · Patient completed Scooting/Bridging with moderate assistance  · Patient completed Supine to Sit with moderate assistance for BLE and minimum for trunk support  · Patient completed Sit to Supine with moderate  Assistance to manage BLE    Functional Mobility/Transfers:  Functional Mobility: Pt with fair seated balance but declining OOB activities this date 2/2 pain and swelling BLE  Activities of Daily Living:  · Toileting- Total A to perform perineal hygiene in bed    Cognitive/Visual Perceptual:  Cognitive/Psychosocial Skills:     -       Oriented to: Person, Place, Time and Situation   -       Follows Commands/attention:Follows multistep  commands  -       Communication: clear/fluent  -       Memory: No Deficits noted  -       Safety  awareness/insight to disability: impaired   -       Mood/Affect/Coping skills/emotional control: Appropriate to situation    Physical Exam:  Postural examination/scapula alignment:    -       Rounded shoulders  -       Forward head  Skin integrity: BLE red (wound care wrapped this date), skin break down noted on heels and L back of thigh, and concerns for new/worsening skin break down in gluteal cleft RN notified and attended to immediately   Edema:  Severe LLE; moderate RLE  Sensation:    -       Intact per pt report  Motor Planning:    -       WFL  Dominant hand:    -       R handed  Upper Extremity Range of Motion:   BUE WFL except RUE limited shoulder flex ~0-85*, PROM L shoulder flex ~0-155* before c/o pain  Upper Extremity Strength:  BUE grossly 4-/5 except L shoulder flex 2/5   Strength: B hands 4-/5  Fine Motor Coordination:  Intact during functional task practive  Gross motor coordination:   WFL    AMPAC 6 Click ADL:  AMPAC Total Score: 16    Treatment & Education:  Pt educated on role of OT and POC.   Pt performing skills as listed above.  Pt educated on continued BUE exercises in bed to prevent further deconditioning.  Pt incontinent of bowel; pt cleaned and blue lyric replaced. RN notified of possible new/worsening skin break down in gluteal cleft after perineal hygiene.  Education:    Patient left HOB elevated with all lines intact, call button in reach, nsg notified and RN and PCT present    GOALS:   Multidisciplinary Problems     Occupational Therapy Goals        Problem: Occupational Therapy Goal    Goal Priority Disciplines Outcome Interventions   Occupational Therapy Goal     OT, PT/OT Ongoing (interventions implemented as appropriate)    Description:  Goals to be met by: 4/2/2019    Patient will increase functional independence with ADLs by performing:    UE Dressing with Modified [place].  LE Dressing with Stand-by Assistance.  Grooming while seated with Modified [place].  Sitting  at edge of bed x15 minutes with Supervision.  Stand pivot transfers with Supervision.  Toilet transfer to bedside commode with Supervision.  Increased functional strength to WFL for self care.  Upper extremity exercise program x8 reps per handout, with independence.                      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    Benign essential hypertension 11/14/2012    Cataract     s/p phacoemulsification    Chronic kidney disease (CKD), stage III (moderate) 9/12/2013    Diabetic peripheral neuropathy associated with type 2 diabetes mellitus 11/14/2014    causing right hemiparesis    Gait disorder     Hyperlipidemia     Iritis - Both Eyes 6/10/2013    Kidney stone     Lymphedema     Morbid obesity with BMI of 40.0-44.9, adult 2/18/2015    Nephrolithiasis 4/20/2016    NS (nuclear sclerosis) 4/1/2013    Nuclear sclerosis - Both Eyes 4/29/2013    Preseptal cellulitis - Right Eye 4/29/2013    Proliferative diabetic retinopathy - Both Eyes 4/29/2013    Proliferative diabetic retinopathy, both eyes 4/1/2013    PSC (posterior subcapsular cataract) - Both Eyes 4/29/2013    S/P hernia repair 12/19/2012    TIA (transient ischemic attack) 11/18/2014    Tinea pedis 7/24/2012    Tinea pedis is present on both feet.     Type 2 diabetes mellitus with renal manifestations, controlled 12/12/2013    Type 2 diabetes, controlled, with moderate nonproliferative diabetic retinopathy without macular edema 9/17/2015    Ulcer of left lower extremity, limited to breakdown of skin 7/8/2015    Unspecified cerebral artery occlusion with cerebral infarction 11/16/2014    Unspecified venous (peripheral) insufficiency     Ureteral stone with hydronephrosis 1/27/2016    UTI (lower urinary tract infection)     Vaginal infection     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       Past Surgical History:   Procedure Laterality Date    APPENDECTOMY       CATARACT EXTRACTION W/  INTRAOCULAR LENS IMPLANT Left 5/21/2013    CATARACT EXTRACTION W/  INTRAOCULAR LENS IMPLANT Right 6/4/2013    CHOLECYSTECTOMY      COLONOSCOPY  12/22/2005    normal    CYSTOSCOPY  4/20/2016    [facility]    CYSTOSCOPY WITH STENT PLACEMENT Right 1/27/2016    [facility]    ESOPHAGOGASTRODUODENOSCOPY  12/21/2015    hiatal hernia, Schatzki ring    ESOPHAGOGASTRODUODENOSCOPY (EGD) N/A 6/30/2014    [facility] (2ND FLR)    EYE SURGERY Bilateral 2008    laser surgery both eyes    INSERTION, IOL PROSTHESIS Right 6/4/2013    [facility] at [facility] OR 1ST FLR    INSERTION, IOL PROSTHESIS Left 5/21/2013    [facility] at [facility] 1ST FLR    NASAL SEPTUM SURGERY      PHACOEMULSIFICATION, CATARACT Right 6/4/2013    [facility] 1ST FLR    PHACOEMULSIFICATION, CATARACT Left 5/21/2013    [facility] at [facility] 1ST FLR    PYELOGRAM-RETROGRADE Right 4/20/2016    [facility]    REMOVAL-STENT-URETERAL Right 4/20/2016    [facility] OR    SUBTOTAL COLECTOMY  12/13/2012    transverse colon, for incarcerated umbilical hernia, Dr. Kat Bower    URETEROSCOPY Right 4/20/2016    [facility] OR       Time Tracking:     OT Date of Treatment: 03/23/19  OT Start Time: 1107  OT Stop Time: 1125  OT Total Time (min): 18 min    Billable Minutes:Evaluation 18    Shalonda Duckworth OT  3/23/2019

## 2019-03-23 NOTE — PLAN OF CARE
Problem: Occupational Therapy Goal  Goal: Occupational Therapy Goal  Goals to be met by: 4/2/2019    Patient will increase functional independence with ADLs by performing:    UE Dressing with Modified [place].  LE Dressing with Stand-by Assistance.  Grooming while seated with Modified [place].  Sitting at edge of bed x15 minutes with Supervision.  Stand pivot transfers with Supervision.  Toilet transfer to bedside commode with Supervision.  Increased functional strength to WFL for self care.  Upper extremity exercise program x8 reps per handout, with independence.    Outcome: Ongoing (interventions implemented as appropriate)  Pt would benefit from continued OT to address deficits in self care and functional mobility. Recommending SNF vs HHOT; DME needs likely none

## 2019-03-23 NOTE — PLAN OF CARE
Problem: Adult Inpatient Plan of Care  Goal: Plan of Care Review  Outcome: Ongoing (interventions implemented as appropriate)  Pt is AAOX4. bedbound due to cellulitis. Uses bed pan, one bm this shift. purewick in place. BLE very edematous. Podiatry/wound care performed wound care and placed drsg to BLE. Started on vanc and meropenem. Pt denied anything for pain. Appetite good. VSS. NAD. Barrier cream applied to bottom for redness. Call light in reach.

## 2019-03-23 NOTE — SUBJECTIVE & OBJECTIVE
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    Benign essential hypertension 11/14/2012    Cataract     s/p phacoemulsification    Chronic kidney disease (CKD), stage III (moderate) 9/12/2013    Diabetic peripheral neuropathy associated with type 2 diabetes mellitus 11/14/2014    causing right hemiparesis    Gait disorder     Hyperlipidemia     Iritis - Both Eyes 6/10/2013    Kidney stone     Lymphedema     Morbid obesity with BMI of 40.0-44.9, adult 2/18/2015    Nephrolithiasis 4/20/2016    NS (nuclear sclerosis) 4/1/2013    Nuclear sclerosis - Both Eyes 4/29/2013    Preseptal cellulitis - Right Eye 4/29/2013    Proliferative diabetic retinopathy - Both Eyes 4/29/2013    Proliferative diabetic retinopathy, both eyes 4/1/2013    PSC (posterior subcapsular cataract) - Both Eyes 4/29/2013    S/P hernia repair 12/19/2012    TIA (transient ischemic attack) 11/18/2014    Tinea pedis 7/24/2012    Tinea pedis is present on both feet.     Type 2 diabetes mellitus with renal manifestations, controlled 12/12/2013    Type 2 diabetes, controlled, with moderate nonproliferative diabetic retinopathy without macular edema 9/17/2015    Ulcer of left lower extremity, limited to breakdown of skin 7/8/2015    Unspecified cerebral artery occlusion with cerebral infarction 11/16/2014    Unspecified venous (peripheral) insufficiency     Ureteral stone with hydronephrosis 1/27/2016    UTI (lower urinary tract infection)     Vaginal infection     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       Past Surgical History:   Procedure Laterality Date    APPENDECTOMY      CATARACT EXTRACTION W/  INTRAOCULAR LENS IMPLANT Left 5/21/2013    CATARACT EXTRACTION W/  INTRAOCULAR LENS IMPLANT Right 6/4/2013    CHOLECYSTECTOMY      COLONOSCOPY  12/22/2005    normal    CYSTOSCOPY  4/20/2016    [facility] OR    CYSTOSCOPY WITH STENT  "PLACEMENT Right 1/27/2016    [facility]    ESOPHAGOGASTRODUODENOSCOPY  12/21/2015    hiatal hernia, Schatzki ring    ESOPHAGOGASTRODUODENOSCOPY (EGD) N/A 6/30/2014    Performed by Jaspreet Ng MD at [facility] ENDO (2ND FLR)    EYE SURGERY Bilateral 2008    laser surgery both eyes    INSERTION, IOL PROSTHESIS Right 6/4/2013    Performed by [facility]ico Schwartz MD at [facility] OR 1ST FLR    INSERTION, IOL PROSTHESIS Left 5/21/2013    Performed by [facility] Schwartz MD at [facility] OR 1ST FLR    NASAL SEPTUM SURGERY      PHACOEMULSIFICATION, CATARACT Right 6/4/2013    Performed by [facility] Schwartz [facility] at [facility] OR 1ST FLR    PHACOEMULSIFICATION, CATARACT Left 5/21/2013    Performed by [facility] Schwartz MD at [facility] OR 1ST FLR    PYELOGRAM-RETROGRADE Right 4/20/2016    [facility] OR    REMOVAL-STENT-URETERAL Right 4/20/2016    [facility] at [facility] OR    SUBTOTAL COLECTOMY  12/13/2012    transverse colon, for incarcerated umbilical hernia, Dr. Kat Bower    URETEROSCOPY Right 4/20/2016    [facility] at [facility] OR       Review of patient's allergies indicates:   Allergen Reactions    Penicillins Hives     Other reaction(s): Hives    Sulfa (sulfonamide antibiotics) Other (See Comments)     Shakes, pt states her doctor told her the shakes were possibly caused by an allergy to sulfa       No current facility-administered medications on file prior to encounter.      Current Outpatient Medications on File Prior to Encounter   Medication Sig    ACCU-CHEK RAMIRO PLUS TEST STRP Strp TEST TWICE DAILY    ACCU-CHEK SOFTCLIX LANCETS Misc Test twice daily    acetaminophen 500 mg/15 mL Liqd     aspirin 81 MG Chew Take 81 mg by mouth once daily.      atorvastatin (LIPITOR) 40 MG tablet TAKE 1 TABLET EVERY EVENING    BD INSULIN SYRINGE ULTRA-FINE 1/2 mL 30 gauge x 1/2" Syrg USE EVERY NIGHT    " bumetanide (BUMEX) 1 MG tablet Take 2 tablets (2 mg total) by mouth once daily.    CALCIUM CARBONATE ORAL     clopidogrel (PLAVIX) 75 mg tablet TAKE 1 TABLET EVERY DAY    diclofenac sodium 1 % Gel Apply 2 g topically nightly as needed. (Patient taking differently: Apply 2 g topically nightly as needed (pain). )    ergocalciferol (ERGOCALCIFEROL) 50,000 unit Cap Take 1 capsule (50,000 Units total) by mouth every 7 days.    fluconazole (DIFLUCAN) 200 MG Tab     gentamicin (GARAMYCIN) 0.1 % ointment Apply topically once daily.    glimepiride (AMARYL) 1 MG tablet Take 2 tablets (2 mg total) by mouth with lunch.    hydroCHLOROthiazide (HYDRODIURIL) 25 MG tablet Take 1 tablet (25 mg total) by mouth once daily.    ibuprofen (ADVIL,MOTRIN) 200 MG tablet     ketoconazole (NIZORAL) 2 % cream Apply topically once daily.    LANTUS U-100 INSULIN 100 unit/mL injection INJECT 7 TO 10 UNITS SUBCUTANEOUSLY IN THE EVENING DEPENDING ON SCALE (DISCARD EACH VIAL AFTER 28 DAYS)    lisinopril 10 MG tablet     Menthol/Camphor/Phen/Salicylic (LIP BALM MEDICATED TOP)     triamcinolone acetonide 0.1% (KENALOG) 0.1 % cream Apply topically 2 (two) times daily.     Family History     Problem Relation (Age of Onset)    Cataracts Sister, Brother    Diabetes Sister    Heart disease Brother    Leukemia Mother        Tobacco Use    Smoking status: Former Smoker     Packs/day: 0.50     Years: 15.00     Pack years: 7.50     Types: Cigarettes     Last attempt to quit: 1982     Years since quittin.7    Smokeless tobacco: Former User    Tobacco comment: smoked one pack per week   Substance and Sexual Activity    Alcohol use: No    Drug use: No    Sexual activity: Not Currently     Review of Systems   Constitutional: Negative for fever and unexpected weight change.   HENT: Negative for ear pain, sinus pressure, sneezing and sore throat.    Eyes: Negative for pain and visual disturbance.   Respiratory: Negative for cough, chest  tightness, shortness of breath and wheezing.    Cardiovascular: Negative for chest pain, palpitations and leg swelling.   Gastrointestinal: Positive for nausea and vomiting. Negative for abdominal pain, constipation and diarrhea.   Endocrine: Negative for polydipsia and polyuria.   Genitourinary: Negative for decreased urine volume, difficulty urinating, dysuria and urgency.   Musculoskeletal: Negative for arthralgias and myalgias.   Skin: Positive for wound. Negative for color change and rash.   Allergic/Immunologic: Negative for environmental allergies and food allergies.   Neurological: Positive for weakness. Negative for dizziness, syncope, light-headedness and headaches.   Psychiatric/Behavioral: Negative for confusion and dysphoric mood. The patient is not nervous/anxious.      Objective:     Vital Signs (Most Recent):  Temp: 98.7 °F (37.1 °C) (03/22/19 1559)  Pulse: 90 (03/22/19 1845)  Resp: 17 (03/22/19 1845)  BP: (!) 144/65 (03/22/19 1845)  SpO2: 95 % (03/22/19 1845) Vital Signs (24h Range):  Temp:  [98.7 °F (37.1 °C)] 98.7 °F (37.1 °C)  Pulse:  [88-92] 90  Resp:  [17-21] 17  SpO2:  [95 %-99 %] 95 %  BP: (128-167)/(59-72) 144/65     Weight: (!) 143.8 kg (317 lb)  Body mass index is 49.65 kg/m².    Physical Exam   Constitutional: She is oriented to person, place, and time. She appears well-developed and well-nourished.   Body mass index is 49.65 kg/m².     HENT:   Head: Normocephalic and atraumatic.   Eyes: EOM are normal. Pupils are equal, round, and reactive to light. No scleral icterus.   Neck: Normal range of motion. Neck supple.   Cardiovascular: Normal rate, regular rhythm, normal heart sounds and intact distal pulses.   No murmur heard.  Pulmonary/Chest: Effort normal and breath sounds normal. No respiratory distress. She has no wheezes.   Abdominal: Soft. Bowel sounds are normal. She exhibits no distension. There is no tenderness.   Musculoskeletal:        Right lower leg: She exhibits edema.         Left lower leg: She exhibits edema.   Venous stasis dermatitis with wounds present.    Neurological: She is alert and oriented to person, place, and time.   Skin: Skin is warm and dry.   Psychiatric: She has a normal mood and affect. Her behavior is normal.   Vitals reviewed.    Left heel, Stage IV d/t tracking      New wound on lateral aspect of right leg        CRANIAL NERVES     CN III, IV, VI   Pupils are equal, round, and reactive to light.  Extraocular motions are normal.        Significant Labs: All pertinent labs within the past 24 hours have been reviewed.      Significant Imaging: I have reviewed all pertinent imaging results/findings within the past 24 hours.

## 2019-03-24 LAB
ALBUMIN SERPL BCP-MCNC: 1.8 G/DL (ref 3.5–5.2)
ALP SERPL-CCNC: 161 U/L (ref 55–135)
ALT SERPL W/O P-5'-P-CCNC: 8 U/L (ref 10–44)
ANION GAP SERPL CALC-SCNC: 7 MMOL/L (ref 8–16)
AST SERPL-CCNC: 12 U/L (ref 10–40)
BACTERIA UR CULT: NORMAL
BACTERIA UR CULT: NORMAL
BASOPHILS # BLD AUTO: 0.01 K/UL (ref 0–0.2)
BASOPHILS NFR BLD: 0.1 % (ref 0–1.9)
BILIRUB SERPL-MCNC: 0.9 MG/DL (ref 0.1–1)
BUN SERPL-MCNC: 27 MG/DL (ref 8–23)
CALCIUM SERPL-MCNC: 8.6 MG/DL (ref 8.7–10.5)
CHLORIDE SERPL-SCNC: 101 MMOL/L (ref 95–110)
CO2 SERPL-SCNC: 26 MMOL/L (ref 23–29)
CREAT SERPL-MCNC: 1.3 MG/DL (ref 0.5–1.4)
DIFFERENTIAL METHOD: ABNORMAL
EOSINOPHIL # BLD AUTO: 0.1 K/UL (ref 0–0.5)
EOSINOPHIL NFR BLD: 0.7 % (ref 0–8)
ERYTHROCYTE [DISTWIDTH] IN BLOOD BY AUTOMATED COUNT: 13.2 % (ref 11.5–14.5)
EST. GFR  (AFRICAN AMERICAN): 46 ML/MIN/1.73 M^2
EST. GFR  (NON AFRICAN AMERICAN): 39.9 ML/MIN/1.73 M^2
GLUCOSE SERPL-MCNC: 148 MG/DL (ref 70–110)
HCT VFR BLD AUTO: 28.6 % (ref 37–48.5)
HGB BLD-MCNC: 9.1 G/DL (ref 12–16)
IMM GRANULOCYTES # BLD AUTO: 0.03 K/UL (ref 0–0.04)
IMM GRANULOCYTES NFR BLD AUTO: 0.4 % (ref 0–0.5)
LYMPHOCYTES # BLD AUTO: 1.2 K/UL (ref 1–4.8)
LYMPHOCYTES NFR BLD: 15.4 % (ref 18–48)
MAGNESIUM SERPL-MCNC: 1.5 MG/DL (ref 1.6–2.6)
MCH RBC QN AUTO: 28.8 PG (ref 27–31)
MCHC RBC AUTO-ENTMCNC: 31.8 G/DL (ref 32–36)
MCV RBC AUTO: 91 FL (ref 82–98)
MONOCYTES # BLD AUTO: 0.6 K/UL (ref 0.3–1)
MONOCYTES NFR BLD: 7.2 % (ref 4–15)
NEUTROPHILS # BLD AUTO: 5.8 K/UL (ref 1.8–7.7)
NEUTROPHILS NFR BLD: 76.2 % (ref 38–73)
NRBC BLD-RTO: 0 /100 WBC
PHOSPHATE SERPL-MCNC: 2.1 MG/DL (ref 2.7–4.5)
PLATELET # BLD AUTO: 262 K/UL (ref 150–350)
PMV BLD AUTO: 9.1 FL (ref 9.2–12.9)
POCT GLUCOSE: 100 MG/DL (ref 70–110)
POCT GLUCOSE: 183 MG/DL (ref 70–110)
POCT GLUCOSE: 219 MG/DL (ref 70–110)
POCT GLUCOSE: 238 MG/DL (ref 70–110)
POTASSIUM SERPL-SCNC: 3.8 MMOL/L (ref 3.5–5.1)
PROT SERPL-MCNC: 6.3 G/DL (ref 6–8.4)
RBC # BLD AUTO: 3.16 M/UL (ref 4–5.4)
SODIUM SERPL-SCNC: 134 MMOL/L (ref 136–145)
WBC # BLD AUTO: 7.66 K/UL (ref 3.9–12.7)

## 2019-03-24 PROCEDURE — 63600175 PHARM REV CODE 636 W HCPCS: Mod: HCNC | Performed by: STUDENT IN AN ORGANIZED HEALTH CARE EDUCATION/TRAINING PROGRAM

## 2019-03-24 PROCEDURE — S5571 INSULIN DISPOS PEN 3 ML: HCPCS | Mod: HCNC | Performed by: STUDENT IN AN ORGANIZED HEALTH CARE EDUCATION/TRAINING PROGRAM

## 2019-03-24 PROCEDURE — 99233 PR SUBSEQUENT HOSPITAL CARE,LEVL III: ICD-10-PCS | Mod: HCNC,,, | Performed by: PODIATRIST

## 2019-03-24 PROCEDURE — 99232 SBSQ HOSP IP/OBS MODERATE 35: CPT | Mod: HCNC,GC,, | Performed by: HOSPITALIST

## 2019-03-24 PROCEDURE — 99232 PR SUBSEQUENT HOSPITAL CARE,LEVL II: ICD-10-PCS | Mod: HCNC,,, | Performed by: INTERNAL MEDICINE

## 2019-03-24 PROCEDURE — 25000003 PHARM REV CODE 250: Mod: HCNC | Performed by: STUDENT IN AN ORGANIZED HEALTH CARE EDUCATION/TRAINING PROGRAM

## 2019-03-24 PROCEDURE — 36415 COLL VENOUS BLD VENIPUNCTURE: CPT | Mod: HCNC

## 2019-03-24 PROCEDURE — 99232 PR SUBSEQUENT HOSPITAL CARE,LEVL II: ICD-10-PCS | Mod: HCNC,GC,, | Performed by: HOSPITALIST

## 2019-03-24 PROCEDURE — 85025 COMPLETE CBC W/AUTO DIFF WBC: CPT | Mod: HCNC

## 2019-03-24 PROCEDURE — 80053 COMPREHEN METABOLIC PANEL: CPT | Mod: HCNC

## 2019-03-24 PROCEDURE — 83735 ASSAY OF MAGNESIUM: CPT | Mod: HCNC

## 2019-03-24 PROCEDURE — 63600175 PHARM REV CODE 636 W HCPCS: Mod: HCNC | Performed by: HOSPITALIST

## 2019-03-24 PROCEDURE — 99233 SBSQ HOSP IP/OBS HIGH 50: CPT | Mod: HCNC,,, | Performed by: PODIATRIST

## 2019-03-24 PROCEDURE — 84100 ASSAY OF PHOSPHORUS: CPT | Mod: HCNC

## 2019-03-24 PROCEDURE — 99232 SBSQ HOSP IP/OBS MODERATE 35: CPT | Mod: HCNC,,, | Performed by: INTERNAL MEDICINE

## 2019-03-24 PROCEDURE — 94761 N-INVAS EAR/PLS OXIMETRY MLT: CPT | Mod: HCNC

## 2019-03-24 PROCEDURE — 11000001 HC ACUTE MED/SURG PRIVATE ROOM: Mod: HCNC

## 2019-03-24 RX ORDER — INSULIN ASPART 100 [IU]/ML
3 INJECTION, SOLUTION INTRAVENOUS; SUBCUTANEOUS
Status: DISCONTINUED | OUTPATIENT
Start: 2019-03-24 | End: 2019-03-27

## 2019-03-24 RX ORDER — BUMETANIDE 1 MG/1
2 TABLET ORAL DAILY
Status: DISCONTINUED | OUTPATIENT
Start: 2019-03-24 | End: 2019-03-28 | Stop reason: HOSPADM

## 2019-03-24 RX ORDER — MEROPENEM AND SODIUM CHLORIDE 1 G/50ML
1 INJECTION, SOLUTION INTRAVENOUS
Status: DISCONTINUED | OUTPATIENT
Start: 2019-03-24 | End: 2019-03-28 | Stop reason: HOSPADM

## 2019-03-24 RX ORDER — INSULIN ASPART 100 [IU]/ML
0-5 INJECTION, SOLUTION INTRAVENOUS; SUBCUTANEOUS
Status: DISCONTINUED | OUTPATIENT
Start: 2019-03-24 | End: 2019-03-28 | Stop reason: HOSPADM

## 2019-03-24 RX ORDER — MAGNESIUM SULFATE HEPTAHYDRATE 40 MG/ML
2 INJECTION, SOLUTION INTRAVENOUS ONCE
Status: COMPLETED | OUTPATIENT
Start: 2019-03-24 | End: 2019-03-24

## 2019-03-24 RX ADMIN — MAGNESIUM SULFATE IN WATER 2 G: 40 INJECTION, SOLUTION INTRAVENOUS at 08:03

## 2019-03-24 RX ADMIN — MEROPENEM AND SODIUM CHLORIDE 1 G: 1 INJECTION, SOLUTION INTRAVENOUS at 08:03

## 2019-03-24 RX ADMIN — INSULIN ASPART 2 UNITS: 100 INJECTION, SOLUTION INTRAVENOUS; SUBCUTANEOUS at 05:03

## 2019-03-24 RX ADMIN — HEPARIN SODIUM 5000 UNITS: 5000 INJECTION, SOLUTION INTRAVENOUS; SUBCUTANEOUS at 02:03

## 2019-03-24 RX ADMIN — HEPARIN SODIUM 5000 UNITS: 5000 INJECTION, SOLUTION INTRAVENOUS; SUBCUTANEOUS at 09:03

## 2019-03-24 RX ADMIN — ASPIRIN 81 MG CHEWABLE TABLET 81 MG: 81 TABLET CHEWABLE at 08:03

## 2019-03-24 RX ADMIN — MICONAZOLE NITRATE: 20 CREAM TOPICAL at 08:03

## 2019-03-24 RX ADMIN — BUMETANIDE 2 MG: 1 TABLET ORAL at 12:03

## 2019-03-24 RX ADMIN — INSULIN ASPART 3 UNITS: 100 INJECTION, SOLUTION INTRAVENOUS; SUBCUTANEOUS at 05:03

## 2019-03-24 RX ADMIN — CLOPIDOGREL 75 MG: 75 TABLET, FILM COATED ORAL at 08:03

## 2019-03-24 RX ADMIN — MEROPENEM AND SODIUM CHLORIDE 1 G: 1 INJECTION, SOLUTION INTRAVENOUS at 02:03

## 2019-03-24 RX ADMIN — MEROPENEM AND SODIUM CHLORIDE 1 G: 1 INJECTION, SOLUTION INTRAVENOUS at 12:03

## 2019-03-24 RX ADMIN — INSULIN ASPART 1 UNITS: 100 INJECTION, SOLUTION INTRAVENOUS; SUBCUTANEOUS at 08:03

## 2019-03-24 RX ADMIN — VANCOMYCIN HYDROCHLORIDE 1750 MG: 100 INJECTION, POWDER, LYOPHILIZED, FOR SOLUTION INTRAVENOUS at 10:03

## 2019-03-24 RX ADMIN — INSULIN DETEMIR 12 UNITS: 100 INJECTION, SOLUTION SUBCUTANEOUS at 08:03

## 2019-03-24 RX ADMIN — ATORVASTATIN CALCIUM 40 MG: 10 TABLET, FILM COATED ORAL at 08:03

## 2019-03-24 RX ADMIN — HEPARIN SODIUM 5000 UNITS: 5000 INJECTION, SOLUTION INTRAVENOUS; SUBCUTANEOUS at 06:03

## 2019-03-24 NOTE — SUBJECTIVE & OBJECTIVE
Interval History: Feels well overnight. No pain, afebrile, no new complaints.    Review of Systems   Constitutional: Negative for chills, fatigue and fever.   Respiratory: Negative for shortness of breath.    Cardiovascular: Positive for leg swelling. Negative for chest pain.   Gastrointestinal: Negative for nausea and vomiting.   Skin: Positive for color change and wound.   Neurological: Positive for numbness.   Psychiatric/Behavioral: Negative for agitation.     Objective:     Vital Signs (Most Recent):  Temp: 97.4 °F (36.3 °C) (03/24/19 1218)  Pulse: 80 (03/24/19 1218)  Resp: 20 (03/24/19 1218)  BP: 136/64 (03/24/19 1218)  SpO2: 97 % (03/24/19 1218) Vital Signs (24h Range):  Temp:  [97 °F (36.1 °C)-98.4 °F (36.9 °C)] 97.4 °F (36.3 °C)  Pulse:  [74-82] 80  Resp:  [15-20] 20  SpO2:  [96 %-97 %] 97 %  BP: (101-154)/(44-65) 136/64     Weight: (!) 140.6 kg (309 lb 15.5 oz)  Body mass index is 48.55 kg/m².    Intake/Output Summary (Last 24 hours) at 3/24/2019 1330  Last data filed at 3/24/2019 1300  Gross per 24 hour   Intake 1200 ml   Output 500 ml   Net 700 ml      Physical Exam   Constitutional: She is oriented to person, place, and time. She appears well-developed and well-nourished.   Body mass index is 49.65 kg/m².     HENT:   Head: Normocephalic and atraumatic.   Eyes: Pupils are equal, round, and reactive to light. EOM are normal. No scleral icterus.   Neck: Normal range of motion. Neck supple.   Cardiovascular: Normal rate, regular rhythm, normal heart sounds and intact distal pulses.   No murmur heard.  Pulmonary/Chest: Effort normal and breath sounds normal. No respiratory distress. She has no wheezes.   Abdominal: Soft. Bowel sounds are normal. She exhibits no distension. There is no tenderness.   Musculoskeletal:        Right lower leg: She exhibits edema.        Left lower leg: She exhibits edema.   Venous stasis dermatitis with wounds present on lateral aspect of distal lower extremities. Foot wounds  covered, wrapped with kerlix.   Neurological: She is alert and oriented to person, place, and time.   Skin: Skin is warm and dry.   Psychiatric: She has a normal mood and affect. Her behavior is normal.   Vitals reviewed.      Significant Labs:   CBC:   Recent Labs   Lab 03/22/19 1744 03/23/19 0314 03/24/19 0317   WBC 13.66* 11.40 7.66   HGB 11.1* 10.3* 9.1*   HCT 33.0* 32.2* 28.6*    260 262     CMP:   Recent Labs   Lab 03/22/19 1744 03/23/19 0314 03/24/19 0316   * 132* 134*   K 4.5 3.7 3.8   CL 93* 96 101   CO2 25 26 26   * 217* 148*   BUN 27* 27* 27*   CREATININE 1.9* 1.6* 1.3   CALCIUM 9.5 9.1 8.6*   PROT 7.6 7.0 6.3   ALBUMIN 2.3* 2.1* 1.8*   BILITOT 2.7* 1.9* 0.9   ALKPHOS 156* 145* 161*   AST 13 10 12   ALT 9* 8* 8*   ANIONGAP 11 10 7*   EGFRNONAA 25.2* 31.1* 39.9*       Significant Imaging: I have reviewed all pertinent imaging results/findings within the past 24 hours.

## 2019-03-24 NOTE — SUBJECTIVE & OBJECTIVE
Interval History: No events    Review of Systems   Constitutional: Negative for fever and unexpected weight change.   HENT: Negative for ear pain, sinus pressure, sneezing and sore throat.    Eyes: Negative for pain and visual disturbance.   Respiratory: Negative for cough, chest tightness, shortness of breath and wheezing.    Cardiovascular: Negative for chest pain, palpitations and leg swelling.   Gastrointestinal: Positive for nausea and vomiting. Negative for abdominal pain, constipation and diarrhea.   Endocrine: Negative for polydipsia and polyuria.   Genitourinary: Negative for decreased urine volume, difficulty urinating, dysuria and urgency.   Musculoskeletal: Negative for arthralgias and myalgias.   Skin: Positive for wound. Negative for color change and rash.   Allergic/Immunologic: Negative for environmental allergies and food allergies.   Neurological: Positive for weakness. Negative for dizziness, syncope, light-headedness and headaches.   Psychiatric/Behavioral: Negative for confusion and dysphoric mood. The patient is not nervous/anxious.      Objective:     Vital Signs (Most Recent):  Temp: 97.4 °F (36.3 °C) (03/24/19 1218)  Pulse: 80 (03/24/19 1218)  Resp: 20 (03/24/19 1218)  BP: 136/64 (03/24/19 1218)  SpO2: 97 % (03/24/19 1218) Vital Signs (24h Range):  Temp:  [97 °F (36.1 °C)-98.4 °F (36.9 °C)] 97.4 °F (36.3 °C)  Pulse:  [74-82] 80  Resp:  [15-20] 20  SpO2:  [96 %-97 %] 97 %  BP: (101-154)/(44-65) 136/64     Weight: (!) 140.6 kg (309 lb 15.5 oz)  Body mass index is 48.55 kg/m².    Estimated Creatinine Clearance: 54.2 mL/min (based on SCr of 1.3 mg/dL).    Physical Exam   Constitutional: She is oriented to person, place, and time. She appears well-developed and well-nourished.   Body mass index is 49.65 kg/m².     HENT:   Head: Normocephalic and atraumatic.   Eyes: Pupils are equal, round, and reactive to light. EOM are normal. No scleral icterus.   Neck: Normal range of motion. Neck supple.    Cardiovascular: Normal rate, regular rhythm, normal heart sounds and intact distal pulses.   No murmur heard.  Pulmonary/Chest: Effort normal and breath sounds normal. No respiratory distress. She has no wheezes.   Abdominal: Soft. Bowel sounds are normal. She exhibits no distension. There is no tenderness.   Musculoskeletal:        Right lower leg: She exhibits edema.        Left lower leg: She exhibits edema.   Venous stasis dermatitis with wounds present.    Neurological: She is alert and oriented to person, place, and time.   Skin: Skin is warm and dry.   Psychiatric: She has a normal mood and affect. Her behavior is normal.   Vitals reviewed.      Significant Labs: All pertinent labs within the past 24 hours have been reviewed.    Significant Imaging: I have reviewed all pertinent imaging results/findings within the past 24 hours.

## 2019-03-24 NOTE — SUBJECTIVE & OBJECTIVE
Scheduled Meds:   aspirin  81 mg Oral Daily    atorvastatin  40 mg Oral QHS    bumetanide  2 mg Oral Daily    clopidogrel  75 mg Oral Daily    heparin (porcine)  5,000 Units Subcutaneous Q8H    insulin aspart U-100  3 Units Subcutaneous TIDWM    insulin detemir U-100  12 Units Subcutaneous QHS    meropenem (MERREM) IVPB  1 g Intravenous Q8H    miconazole   Topical (Top) BID    vancomycin (VANCOCIN) IVPB  1,750 mg Intravenous Q24H     Continuous Infusions:  PRN Meds:acetaminophen, acetaminophen, dextrose 50%, dextrose 50%, diphenhydrAMINE, glucagon (human recombinant), glucose, glucose, insulin aspart U-100, ramelteon, sodium chloride 0.9%    Review of patient's allergies indicates:   Allergen Reactions    Penicillins Hives     Other reaction(s): Hives    Sulfa (sulfonamide antibiotics) Other (See Comments)     Shakes, pt states her doctor told her the shakes were possibly caused by an allergy to sulfa        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    Benign essential hypertension 11/14/2012    Cataract     s/p phacoemulsification    Chronic kidney disease (CKD), stage III (moderate) 9/12/2013    Diabetic peripheral neuropathy associated with type 2 diabetes mellitus 11/14/2014    causing right hemiparesis    Gait disorder     Hyperlipidemia     Iritis - Both Eyes 6/10/2013    Kidney stone     Lymphedema     Morbid obesity with BMI of 40.0-44.9, adult 2/18/2015    Nephrolithiasis 4/20/2016    NS (nuclear sclerosis) 4/1/2013    Nuclear sclerosis - Both Eyes 4/29/2013    Preseptal cellulitis - Right Eye 4/29/2013    Proliferative diabetic retinopathy - Both Eyes 4/29/2013    Proliferative diabetic retinopathy, both eyes 4/1/2013    PSC (posterior subcapsular cataract) - Both Eyes 4/29/2013    S/P hernia repair 12/19/2012    TIA (transient ischemic attack) 11/18/2014    Tinea pedis 7/24/2012    Tinea pedis is present on both feet.      Type 2 diabetes mellitus with renal manifestations, controlled 12/12/2013    Type 2 diabetes, controlled, with moderate nonproliferative diabetic retinopathy without macular edema 9/17/2015    Ulcer of left lower extremity, limited to breakdown of skin 7/8/2015    Unspecified cerebral artery occlusion with cerebral infarction 11/16/2014    Unspecified venous (peripheral) insufficiency     Ureteral stone with hydronephrosis 1/27/2016    UTI (lower urinary tract infection)     Vaginal infection     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     Past Surgical History:   Procedure Laterality Date    APPENDECTOMY      CATARACT EXTRACTION W/  INTRAOCULAR LENS IMPLANT Left 5/21/2013    CATARACT EXTRACTION W/  INTRAOCULAR LENS IMPLANT Right 6/4/2013    CHOLECYSTECTOMY      COLONOSCOPY  12/22/2005    normal    CYSTOSCOPY  4/20/2016    [facility]    CYSTOSCOPY WITH STENT PLACEMENT Right 1/27/2016    [facility]    ESOPHAGOGASTRODUODENOSCOPY  12/21/2015    hiatal hernia, Schatzki ring    ESOPHAGOGASTRODUODENOSCOPY (EGD) N/A 6/30/2014    Performed by Jaspreet Ng [facility] at [facility] ENDO (2ND FLR)    EYE SURGERY Bilateral 2008    laser surgery both eyes    INSERTION, IOL PROSTHESIS Right 6/4/2013    Performed by [facility]erico Schwartz [facility] at [facility] OR 1ST FLR    INSERTION, IOL PROSTHESIS Left 5/21/2013    Performed by [facility] at [facility] OR 1ST FLR    NASAL SEPTUM SURGERY      PHACOEMULSIFICATION, CATARACT Right 6/4/2013    Performed [facility] at [facility] OR 1ST FLR    PHACOEMULSIFICATION, CATARACT Left 5/21/2013    Performed by [facility] at [facility] OR 1ST FLR    PYELOGRAM-RETROGRADE Right 4/20/2016    [facility] OR    REMOVAL-STENT-URETERAL Right 4/20/2016    [facility] at [facility] OR    SUBTOTAL COLECTOMY  12/13/2012    transverse colon, for  incarcerated umbilical hernia, Dr. Kat Bower    URETEROSCOPY Right 2016    [facility]       Family History     Problem Relation (Age of Onset)    Cataracts Sister, Brother    Diabetes Sister    Heart disease Brother    Leukemia Mother        Tobacco Use    Smoking status: Former Smoker     Packs/day: 0.50     Years: 15.00     Pack years: 7.50     Types: Cigarettes     Last attempt to quit: 1982     Years since quittin.7    Smokeless tobacco: Former User    Tobacco comment: smoked one pack per week   Substance and Sexual Activity    Alcohol use: No    Drug use: No    Sexual activity: Not Currently     Review of Systems   Constitutional: Negative for chills, fatigue and fever.   Respiratory: Negative for shortness of breath.    Cardiovascular: Positive for leg swelling. Negative for chest pain.   Gastrointestinal: Negative for nausea and vomiting.   Skin: Positive for color change and wound.   Neurological: Positive for numbness.   Psychiatric/Behavioral: Negative for agitation.     Objective:     Vital Signs (Most Recent):  Temp: 97.4 °F (36.3 °C) (19 1218)  Pulse: 80 (19 1218)  Resp: 20 (19 1218)  BP: 136/64 (19 1218)  SpO2: 97 % (19 1218) Vital Signs (24h Range):  Temp:  [97 °F (36.1 °C)-98.4 °F (36.9 °C)] 97.4 °F (36.3 °C)  Pulse:  [74-82] 80  Resp:  [14-20] 20  SpO2:  [95 %-97 %] 97 %  BP: (101-154)/(44-65) 136/64     Weight: (!) 140.6 kg (309 lb 15.5 oz)  Body mass index is 48.55 kg/m².    Foot Exam    General  General Appearance: appears stated age and healthy   Orientation: alert and oriented to person, place, and time   Affect: appropriate       Right Foot/Ankle     Inspection and Palpation  Ecchymosis: dorsum  Swelling: dorsum   Skin Exam: cellulitis, ulcer and erythema; no drainage     Neurovascular  Dorsalis pedis: 1+  Posterior tibial: absent  Saphenous nerve sensation: diminished  Tibial nerve sensation:  diminished  Superficial peroneal nerve sensation: diminished  Deep peroneal nerve sensation: diminished  Sural nerve sensation: diminished      Left Foot/Ankle      Inspection and Palpation  Ecchymosis: dorsum  Swelling: dorsum   Skin Exam: cellulitis, ulcer and erythema; no drainage     Neurovascular  Dorsalis pedis: 1+  Posterior tibial: absent  Saphenous nerve sensation: diminished  Tibial nerve sensation: diminished  Superficial peroneal nerve sensation: diminished  Deep peroneal nerve sensation: diminished  Sural nerve sensation: diminished            Laboratory:  A1C:   Recent Labs   Lab 03/22/19  1744   HGBA1C 9.8*     Blood Cultures:   Recent Labs   Lab 03/22/19  1755 03/22/19  1757   LABBLOO No Growth to date  No Growth to date No Growth to date  No Growth to date     ESR:   Recent Labs   Lab 03/22/19  1744   SEDRATE 90*     Prealbumin: No results for input(s): PREALBUMIN in the last 48 hours.  Wound Cultures:   Recent Labs   Lab 11/21/18  1120 03/22/19  2200 03/23/19  1119   LABAERO PSEUDOMONAS AERUGINOSA  Moderate    KLEBSIELLA OXYTOCA  Few    PROTEUS MIRABILIS  Moderate   PRESUMPTIVE PSEUDOMONAS SPECIES  Many  Identification and susceptibility pending    GRAM NEGATIVE YAIMA  Many  Identification and susceptibility pending  Skin lyndsey also present    GRAM NEGATIVE YAIMA  Moderate  Identification and susceptibility pending    STAPHYLOCOCCUS AUREUS  Many  Susceptibility pending    PRESUMPTIVE PSEUDOMONAS SPECIES  Many  Identification and susceptibility pending    GRAM NEGATIVE YAIMA  Moderate  Identification and susceptibility pending   GRAM NEGATIVE YAIMA  Few  Identification and susceptibility pending    STAPHYLOCOCCUS AUREUS  Many  Susceptibility pending       Microbiology Results (last 7 days)     Procedure Component Value Units Date/Time    Aerobic culture [[id number]] Collected:  03/22/19 2200    Order Status:  Completed Specimen:  Wound from Toe, Left Foot Updated:  03/24/19 0819     Aerobic  Bacterial Culture --     PRESUMPTIVE PSEUDOMONAS SPECIES  Many  Identification and susceptibility pending       Aerobic Bacterial Culture --     GRAM NEGATIVE YAIMA  Many  Identification and susceptibility pending  Skin lyndsey also present      Aerobic culture [[id number]] Collected:  03/23/19 1119    Order Status:  Completed Specimen:  Wound from Foot, Left Updated:  03/24/19 0807     Aerobic Bacterial Culture --     GRAM NEGATIVE YAIMA  Few  Identification and susceptibility pending       Aerobic Bacterial Culture --     STAPHYLOCOCCUS AUREUS  Many  Susceptibility pending      Narrative:       Heel wound    Aerobic culture [[id number]] Collected:  03/22/19 2200    Order Status:  Completed Specimen:  Wound from Foot, Left Updated:  03/24/19 0805     Aerobic Bacterial Culture --     GRAM NEGATIVE YAIMA  Moderate  Identification and susceptibility pending       Aerobic Bacterial Culture --     STAPHYLOCOCCUS AUREUS  Many  Susceptibility pending      Narrative:       Left heel    Aerobic culture [[id number]] Collected:  03/22/19 2200    Order Status:  Completed Specimen:  Wound from Foot, Left Updated:  03/24/19 0744     Aerobic Bacterial Culture --     PRESUMPTIVE PSEUDOMONAS SPECIES  Many  Identification and susceptibility pending       Aerobic Bacterial Culture --     GRAM NEGATIVE YAIMA  Moderate  Identification and susceptibility pending      Narrative:       Left lower calf    Urine culture [[id number]] Collected:  03/22/19 2200    Order Status:  Completed Specimen:  Urine Updated:  03/24/19 0210     Urine Culture, Routine Multiple organisms isolated. None in predominance.  Repeat if     Urine Culture, Routine clinically necessary.    Narrative:       Preferred Collection Type->Urine, Clean Catch    Blood culture #1 **CANNOT BE ORDERED STAT** [[id number]] Collected:  03/22/19 1755    Order Status:  Completed Specimen:  Blood from Peripheral, Hand, Right Updated:  03/23/19 2012     Blood Culture, Routine No Growth to date      Blood Culture, Routine No Growth to date    Blood culture #2 **CANNOT BE ORDERED STAT** [[id number]] Collected:  03/22/19 5182    Order Status:  Completed Specimen:  Blood from Peripheral, Forearm, Right Updated:  03/23/19 2012     Blood Culture, Routine No Growth to date     Blood Culture, Routine No Growth to date    Culture, Anaerobe [[id number]] Collected:  03/23/19 1119    Order Status:  Sent Specimen:  Wound from Foot, Left Updated:  03/23/19 1217        Specimen (12h ago, onward)    None          Diagnostic Results:  MRI: I have reviewed all pertinent results/findings within the past 24 hours.  reviewed  X-Ray: I have reviewed all pertinent results/findings within the past 24 hours.  reviewed  I have reviewed all pertinent imaging results/findings within the past 24 hours.    Clinical Findings:    Multiple superficial granular wounds noted to yessenia LE with periwound erythema and edema noted. Wounds do not probe to bone. No malodor or purulent drainage noted. No clinical signs of abscess noted. Wound to left heel does not probe to bone. Cellulitis noted to left heel and ankle.

## 2019-03-24 NOTE — PROGRESS NOTES
[facility] Hospital Medicine  Progress Note    Patient Name: Sherin Ortiz  MRN: [id number]  Patient Class: IP- Inpatient   Admission Date: 3/22/2019  Length of Stay: 2 days  Attending Physician: Maty Cohen MD  Primary Care Provider: Ame Vasquez MD    [facility] Medicine Team: [facility] HOSP MED 5 Kassandra Miller MD    Subjective:     Principal Problem:Cellulitis    HPI:  Ms Ortiz is a 75 yo F w PMHx significant for nonischemic cardiomyopathy resulting in HFpEF, chronic venous stasis resulting in dermatitis and chronic wounds affecting lower extremities, uncontrolled diabetes, HTN, h/o TIA, neuropathy and CKD 3 (baseline Cr of 1.4-1.6)    Pt presents for evaluation of left heel pain of two day duration. Pain had 10/10 intensity and came on abruptly while seated in her wheelchair. Pain felt like deep pressure on the left heel underneath one of her chronic wounds. Pain is exacerbated by transfers from her wheelchair. She cannot think of what may have caused this pain as she denies trauma to the area and even takes care to transfer on inverted feet so as not to apply direct pressure on the wound. She reports clear drainage from the wound, but states this has been an ongoing issue and is not new. She visits with wound care Nurse Lyle to receive treatment of multiple wounds and ulcers on bilateral lower extremities. She states she is adherent to the regiment prescribed and has been receiving home visits from skilled nursing three times per week. Despite this, she unfortunately has developed a new ulcer on her right leg.     She denies fever, purulent drainage or recent weight loss. Review of systems was positive for nausea with one episode of self-induced vomiting which resolved. Other than transfers, pt does not ambulate.      Hospital Course:  Admitted 03/22 with heel pain x2 days consistent with cellulitis. Initial concern for osteomyelitis (ESR, CRP elevated). Given vanc and meropenem in  ED (PCN allergy, hx resistant organisms in wounds). MRI inconclusive regarding presence / absence of osteomyelitis. Podiatry and ID consulted; Podiatry feels infection consistent with cellulitis and recommends 2-3 week course antibiotics which ID agrees with.     On vanc and meropenem until c/s result.    Interval History: Feels well overnight. No pain, afebrile, no new complaints.    Review of Systems   Constitutional: Negative for chills, fatigue and fever.   Respiratory: Negative for shortness of breath.    Cardiovascular: Positive for leg swelling. Negative for chest pain.   Gastrointestinal: Negative for nausea and vomiting.   Skin: Positive for color change and wound.   Neurological: Positive for numbness.   Psychiatric/Behavioral: Negative for agitation.     Objective:     Vital Signs (Most Recent):  Temp: 97.4 °F (36.3 °C) (03/24/19 1218)  Pulse: 80 (03/24/19 1218)  Resp: 20 (03/24/19 1218)  BP: 136/64 (03/24/19 1218)  SpO2: 97 % (03/24/19 1218) Vital Signs (24h Range):  Temp:  [97 °F (36.1 °C)-98.4 °F (36.9 °C)] 97.4 °F (36.3 °C)  Pulse:  [74-82] 80  Resp:  [15-20] 20  SpO2:  [96 %-97 %] 97 %  BP: (101-154)/(44-65) 136/64     Weight: (!) 140.6 kg (309 lb 15.5 oz)  Body mass index is 48.55 kg/m².    Intake/Output Summary (Last 24 hours) at 3/24/2019 1330  Last data filed at 3/24/2019 1300  Gross per 24 hour   Intake 1200 ml   Output 500 ml   Net 700 ml      Physical Exam   Constitutional: She is oriented to person, place, and time. She appears well-developed and well-nourished.   Body mass index is 49.65 kg/m².     HENT:   Head: Normocephalic and atraumatic.   Eyes: Pupils are equal, round, and reactive to light. EOM are normal. No scleral icterus.   Neck: Normal range of motion. Neck supple.   Cardiovascular: Normal rate, regular rhythm, normal heart sounds and intact distal pulses.   No murmur heard.  Pulmonary/Chest: Effort normal and breath sounds normal. No respiratory distress. She has no wheezes.  "  Abdominal: Soft. Bowel sounds are normal. She exhibits no distension. There is no tenderness.   Musculoskeletal:        Right lower leg: She exhibits edema.        Left lower leg: She exhibits edema.   Venous stasis dermatitis with wounds present on lateral aspect of distal lower extremities. Foot wounds covered, wrapped with kerlix.   Neurological: She is alert and oriented to person, place, and time.   Skin: Skin is warm and dry.   Psychiatric: She has a normal mood and affect. Her behavior is normal.   Vitals reviewed.      Significant Labs:   CBC:   Recent Labs   Lab 03/22/19 1744 03/23/19 0314 03/24/19 0317   WBC 13.66* 11.40 7.66   HGB 11.1* 10.3* 9.1*   HCT 33.0* 32.2* 28.6*    260 262     CMP:   Recent Labs   Lab 03/22/19 1744 03/23/19 0314 03/24/19 0316   * 132* 134*   K 4.5 3.7 3.8   CL 93* 96 101   CO2 25 26 26   * 217* 148*   BUN 27* 27* 27*   CREATININE 1.9* 1.6* 1.3   CALCIUM 9.5 9.1 8.6*   PROT 7.6 7.0 6.3   ALBUMIN 2.3* 2.1* 1.8*   BILITOT 2.7* 1.9* 0.9   ALKPHOS 156* 145* 161*   AST 13 10 12   ALT 9* 8* 8*   ANIONGAP 11 10 7*   EGFRNONAA 25.2* 31.1* 39.9*       Significant Imaging: I have reviewed all pertinent imaging results/findings within the past 24 hours.    Assessment/Plan:      * Cellulitis  LEUKOCYTOSIS  VENOUS INSUFFICIENCY OF LEG  DERMATITIS, STASIS  ULCER OF LEFT HEEL AND MIDFOOT    Elderly pt with chronic wound localized to the left heel as a result of chronic venous stasis and diabetes, presents with abrupt-onset left heel pain of two day duration characterized by pressure. Physical exam reveals progression of stage 3 ulcer to stage 4 ulcer. Concerned for progression to osteomyelitis as fat pad appears to be exposed. Previous aerobic culture (11/2018) positive for P aeruginosa, K oxytoca, and P mirabalis, with variable resistance.     MRI hindfoot 03/23 "Destructive/ neuropathic hindfoot joint changes with nonunion calcaneal fracture and extensive reactive " "marrow edema.  Superimposed osteomyelitis may be present."    ESR, CRP elevated on admit    Micro:   Blood cultures 03/22 NGTD  Superficial wound cultures 03/22: Pseudomonas, other GNR, Staph Aureus  Deep wound cultures 03/23: GNR, Staph Aureus    Plan  - Daily CBC  - MRI hindfoot without contrast d/t LOREN  - Vancomycin, 2g q24 (CrCl of 37.6; weight 144 kg)  - Meropenem 1g q8   - Podiatry recommends treating as cellulitis as wound does not probe to bone, ID agrees. Will keep broad antibiotics until C/S result and narrow as appropriate, plan 2-3 week course.    Goals of care, counseling/discussion  After CODE status discussion with patient and daughter at bedside regarding risks and goals of advanced resuscitation, pt wishes to be made DNR.       Prolonged Q-T interval on ECG  On presentation to ED, initial EKG with QTc of 511 msec. Appears to be chronic phenomenon.     Plan  - Mg lvl as part of RFP  - Avoid anti-emetics unless necessary      Acute renal failure superimposed on stage 3 chronic kidney disease  Pt with chronic kidney disease stage 3 has baseline Cr of 1.4 to 1.6. Currently with Cr of 1.9 concerning for acute renal failure. Received 500 cc NS bolus in emergency department. Pt does not appear septic and has not been hypotensive.     Plan  - Daily RFP  - Urinalysis  - FEUrea d/t bumetanide  - RP U/S for hydronephrosis  - Renally dose medications  - Avoid nephrotoxic agents  - Avoid relative hypotension   - Aim for SBP between 120 and 135 mmHg  - Strict I's/O's  - Daily weights -- will be difficult as she is wheelchair bound      (HFpEF) heart failure with preserved ejection fraction  Appear stable. No HANDY or orthopnea. JVD did not appear elevated. Last Echo performed in 2014 only revealed diastolic dysfunction without reduction in EF. Tolerates home diuretic regimen.    Plan  - Bumetanide 2mg qd      Alkaline phosphatase elevation  HYPERBILIRUBINEMIA    Alkaline phosphatase elevation with elevation in " bilirubin in pt presenting with episode of nausea and emesis.     Plan  - CMP  - Abdominal ultrasound       Hx of transient ischemic attack (TIA)  HYPERLIPIDEMIA    H/o TIA on DAPT with aspirin and clopidogrel. TIA was years ago. May need outpatient follow up with vascular neurologist.     - C/w aspirin 81 mg   - C/w atorvastatin 40 mg   - C/w clopidogrel 75 mg    Uncontrolled type 2 diabetes mellitus with stage 3 chronic kidney disease, with long-term current use of insulin  HYPERGLYCEMIA    Initial blood glucose on arrival was 295 mg/dL. Home regimen consists of glimeperide and nightly insulin lantus 35 units. Most recent A1c 8/2018 was 10.6%.     Plan  - Target -180 mg/dL  - Detemir QHS 12 units  - Levemir 3U TIDAC  - LDSSI   - A1c      Hypoalbuminemia  Baseline ranges from 2.6 to 3.0 will continue to monitor      Anemia of chronic disease  Pt with established diagnosis of anemia of chronic disease. Baseline Hgb of 10.6 to 11.5 g/dL. Currently at baseline.           Diabetic peripheral neuropathy associated with type 2 diabetes mellitus  Pt has peripheral neuropathy characterized by loss of sensation rather than paresthesia and chronic pain. Likely contributed to her chronic wounds.       Morbid obesity with BMI of 40.0-44.9, adult  Diabetic 2000 calorie diet      Weakness  WHEELCHAIR DEPENDENT    - PT/OT      Hyponatremia  Presented with sodium of 129 mmol/L in setting of hyperglycemia. Corrected to 134 mmol/L      Essential hypertension  Pt states she takes both diuretic medications, HCTZ and bumex. She is unsure if she takes her lisinopril any longer. Mildly hypertensive when she arrived in the ED.     Plan  - Ask pharmacy for med rec  - Continue bumetinide 2mg   - Hold HCTZ 25 mg -- consider discontinuation as she is now CKD3      Hyperlipidemia LDL goal <100          VTE Risk Mitigation (From admission, onward)        Ordered     heparin (porcine) injection 5,000 Units  Every 8 hours      03/22/19  2150     IP VTE HIGH RISK PATIENT  Once      03/22/19 2150              Kassandra Miller MD  Department of Hospital Medicine   [facility]

## 2019-03-24 NOTE — PROGRESS NOTES
[facility]  Infectious Disease  Progress Note    Patient Name: Sherin Ortiz  MRN: [id number]  Admission Date: 3/22/2019  Length of Stay: 2 days  Attending Physician: Maty Cohen MD  Primary Care Provider: Ame Vasquez MD    Isolation Status: No active isolations  Assessment/Plan:      Ulcer of left heel and midfoot, limited to breakdown of skin  Podiatry with less concern for osteo. MRI inconclusive. Agree with treating as cellulitis     -continue vanco and meropenem  -await final cultures from podiatry   -agree with 2-3 week course        Thank you for your consult. I will follow-up with patient. Please contact us if you have any additional questions.    Jacob Lai MD  Infectious Disease  [facility]    Subjective:     Principal Problem:Cellulitis    HPI: 73 yo F w PMHx significant for nonischemic cardiomyopathy resulting in HFpEF, chronic venous stasis resulting in dermatitis and chronic wounds affecting lower extremities, uncontrolled diabetes, HTN, h/o TIA, neuropathy and CKD 3 (baseline Cr of 1.4-1.6)     Pt presents for evaluation of left heel pain of two day duration. Pain had 10/10 intensity and came on abruptly while seated in her wheelchair. Pain felt like deep pressure on the left heel underneath one of her chronic wounds. Pain is exacerbated by transfers from her wheelchair. She cannot think of what may have caused this pain as she denies trauma to the area and even takes care to transfer on inverted feet so as not to apply direct pressure on the wound. She reports clear drainage from the wound, but states this has been an ongoing issue and is not new. She visits with wound care Nurse Lyle to receive treatment of multiple wounds and ulcers on bilateral lower extremities. She states she is adherent to the regiment prescribed and has been receiving home visits from skilled nursing three times per week. Despite this, she unfortunately has developed a  new ulcer on her right leg.      She denies fever, purulent drainage or recent weight loss. Review of systems was positive for nausea with one episode of self-induced vomiting which resolved. Other than transfers, pt does not ambulate.    MRI was done and could not rule out osteo  Interval History: No events    Review of Systems   Constitutional: Negative for fever and unexpected weight change.   HENT: Negative for ear pain, sinus pressure, sneezing and sore throat.    Eyes: Negative for pain and visual disturbance.   Respiratory: Negative for cough, chest tightness, shortness of breath and wheezing.    Cardiovascular: Negative for chest pain, palpitations and leg swelling.   Gastrointestinal: Positive for nausea and vomiting. Negative for abdominal pain, constipation and diarrhea.   Endocrine: Negative for polydipsia and polyuria.   Genitourinary: Negative for decreased urine volume, difficulty urinating, dysuria and urgency.   Musculoskeletal: Negative for arthralgias and myalgias.   Skin: Positive for wound. Negative for color change and rash.   Allergic/Immunologic: Negative for environmental allergies and food allergies.   Neurological: Positive for weakness. Negative for dizziness, syncope, light-headedness and headaches.   Psychiatric/Behavioral: Negative for confusion and dysphoric mood. The patient is not nervous/anxious.      Objective:     Vital Signs (Most Recent):  Temp: 97.4 °F (36.3 °C) (03/24/19 1218)  Pulse: 80 (03/24/19 1218)  Resp: 20 (03/24/19 1218)  BP: 136/64 (03/24/19 1218)  SpO2: 97 % (03/24/19 1218) Vital Signs (24h Range):  Temp:  [97 °F (36.1 °C)-98.4 °F (36.9 °C)] 97.4 °F (36.3 °C)  Pulse:  [74-82] 80  Resp:  [15-20] 20  SpO2:  [96 %-97 %] 97 %  BP: (101-154)/(44-65) 136/64     Weight: (!) 140.6 kg (309 lb 15.5 oz)  Body mass index is 48.55 kg/m².    Estimated Creatinine Clearance: 54.2 mL/min (based on SCr of 1.3 mg/dL).    Physical Exam   Constitutional: She is oriented to person,  place, and time. She appears well-developed and well-nourished.   Body mass index is 49.65 kg/m².     HENT:   Head: Normocephalic and atraumatic.   Eyes: Pupils are equal, round, and reactive to light. EOM are normal. No scleral icterus.   Neck: Normal range of motion. Neck supple.   Cardiovascular: Normal rate, regular rhythm, normal heart sounds and intact distal pulses.   No murmur heard.  Pulmonary/Chest: Effort normal and breath sounds normal. No respiratory distress. She has no wheezes.   Abdominal: Soft. Bowel sounds are normal. She exhibits no distension. There is no tenderness.   Musculoskeletal:        Right lower leg: She exhibits edema.        Left lower leg: She exhibits edema.   Venous stasis dermatitis with wounds present.    Neurological: She is alert and oriented to person, place, and time.   Skin: Skin is warm and dry.   Psychiatric: She has a normal mood and affect. Her behavior is normal.   Vitals reviewed.      Significant Labs: All pertinent labs within the past 24 hours have been reviewed.    Significant Imaging: I have reviewed all pertinent imaging results/findings within the past 24 hours.

## 2019-03-24 NOTE — PROGRESS NOTES
Pharmacist Renal Dose Adjustment Note    Sherin Ortiz is a 74 y.o. female being treated with the medication meropenem    Patient Data:    Vital Signs (Most Recent):  Temp: 97.6 °F (36.4 °C) (03/24/19 0742)  Pulse: 77 (03/24/19 0742)  Resp: 15 (03/24/19 0742)  BP: (!) 108/55 (03/24/19 0742)  SpO2: 97 % (03/24/19 0742)   Vital Signs (72h Range):  Temp:  [97 °F (36.1 °C)-98.7 °F (37.1 °C)]   Pulse:  [74-92]   Resp:  [14-24]   BP: (101-167)/(44-72)   SpO2:  [95 %-99 %]      Recent Labs   Lab 03/22/19  1744 03/23/19  0314 03/24/19  0316   CREATININE 1.9* 1.6* 1.3     Serum creatinine: 1.3 mg/dL 03/24/19 0316  Estimated creatinine clearance: 54.2 mL/min    Meropenem 1g IV Q12H will be changed to meropenem 1g IV Q8H given recovering RF and cx w/ presumptive pseudomonas     Pharmacist's Name: July Rae, PharmD, BCPS  Pharmacist's Extension: [phone]

## 2019-03-24 NOTE — PLAN OF CARE
Problem: Adult Inpatient Plan of Care  Goal: Plan of Care Review  Outcome: Ongoing (interventions implemented as appropriate)     03/24/19 3199   Plan of Care Review   Plan of Care Reviewed With patient     Pt remain free from fall and injuries at this time. Tolerated meds well throughout the shift. Cont on IV ABX. BLE dressing on, remain edematous. Denies any pain and discomfort. Placed call light to easy reach and bed locked & lowest position. Will monitor.

## 2019-03-24 NOTE — HOSPITAL COURSE
Admitted 03/22 with heel pain x2 days consistent with cellulitis. Initial concern for osteomyelitis (ESR, CRP elevated). Given vanc and meropenem in ED (PCN allergy, hx resistant organisms in wounds). MRI inconclusive regarding presence / absence of osteomyelitis. Podiatry and ID consulted; Podiatry feels infection consistent with cellulitis and recommends 2-3 week course antibiotics which ID agrees with.     03/27/2019 GRACIELA. PICC placed in am.

## 2019-03-24 NOTE — PROGRESS NOTES
[facility]  Podiatry  Progress Note    Patient Name: Sherin Ortiz  MRN: [id number]  Admission Date: 3/22/2019  Hospital Length of Stay: 2 days  Attending Physician: Maty Cohen MD  Primary Care Provider: Ame Vasquez MD     Interval Hx: Pt seen at bedside, no pedal complaints at this time.     Scheduled Meds:   aspirin  81 mg Oral Daily    atorvastatin  40 mg Oral QHS    bumetanide  2 mg Oral Daily    clopidogrel  75 mg Oral Daily    heparin (porcine)  5,000 Units Subcutaneous Q8H    insulin aspart U-100  3 Units Subcutaneous TIDWM    insulin detemir U-100  12 Units Subcutaneous QHS    meropenem (MERREM) IVPB  1 g Intravenous Q8H    miconazole   Topical (Top) BID    vancomycin (VANCOCIN) IVPB  1,750 mg Intravenous Q24H     Continuous Infusions:  PRN Meds:acetaminophen, acetaminophen, dextrose 50%, dextrose 50%, diphenhydrAMINE, glucagon (human recombinant), glucose, glucose, insulin aspart U-100, ramelteon, sodium chloride 0.9%    Review of patient's allergies indicates:   Allergen Reactions    Penicillins Hives     Other reaction(s): Hives    Sulfa (sulfonamide antibiotics) Other (See Comments)     Shakes, pt states her doctor told her the shakes were possibly caused by an allergy to sulfa        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    Benign essential hypertension 11/14/2012    Cataract     s/p phacoemulsification    Chronic kidney disease (CKD), stage III (moderate) 9/12/2013    Diabetic peripheral neuropathy associated with type 2 diabetes mellitus 11/14/2014    causing right hemiparesis    Gait disorder     Hyperlipidemia     Iritis - Both Eyes 6/10/2013    Kidney stone     Lymphedema     Morbid obesity with BMI of 40.0-44.9, adult 2/18/2015    Nephrolithiasis 4/20/2016    NS (nuclear sclerosis) 4/1/2013    Nuclear sclerosis - Both Eyes 4/29/2013    Preseptal cellulitis - Right Eye 4/29/2013     Proliferative diabetic retinopathy - Both Eyes 4/29/2013    Proliferative diabetic retinopathy, both eyes 4/1/2013    PSC (posterior subcapsular cataract) - Both Eyes 4/29/2013    S/P hernia repair 12/19/2012    TIA (transient ischemic attack) 11/18/2014    Tinea pedis 7/24/2012    Tinea pedis is present on both feet.    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     Past Surgical History:   Procedure Laterality Date    APPENDECTOMY      CATARACT EXTRACTION W/  INTRAOCULAR LENS IMPLANT Left 5/21/2013    CATARACT EXTRACTION W/  INTRAOCULAR LENS IMPLANT Right 6/4/2013    CHOLECYSTECTOMY      COLONOSCOPY  12/22/2005    normal    CYSTOSCOPY  4/20/2016    [facility]    CYSTOSCOPY WITH STENT PLACEMENT Right 1/27/2016    [facility]    ESOPHAGOGASTRODUODENOSCOPY  12/21/2015    hiatal hernia, Schatzki ring    ESOPHAGOGASTRODUODENOSCOPY (EGD) N/A 6/30/2014    Performed by [facility]reet Ng [facility] at [facility] ENDO (2ND FLR)    EYE SURGERY Bilateral 2008    laser surgery both eyes    INSERTION, IOL PROSTHESIS Right 6/4/2013    Performed by [facility]ico Schwartz [facility] at [facility] OR 1ST FLR    INSERTION, IOL PROSTHESIS Left 5/21/2013    Performed by [facility] at [facility] OR 1ST FLR    NASAL SEPTUM SURGERY      PHACOEMULSIFICATION, CATARACT Right 6/4/2013    Performed by [facility] at [facility] OR 1ST FLR    PHACOEMULSIFICATION,  CATARACT Left 2013    [facility] 1ST FLR    PYELOGRAM-RETROGRADE Right 2016    [facility] at [facility] OR    REMOVAL-STENT-URETERAL Right 2016    [facility] at [facility] OR    SUBTOTAL COLECTOMY  2012    transverse colon, for incarcerated umbilical hernia, Dr. Kat Trujillo-Katie    URETEROSCOPY Right 2016    Performed by [facility] at [facility] OR       Family History     Problem Relation (Age of Onset)    Cataracts Sister, Brother    Diabetes Sister    Heart disease Brother    Leukemia Mother        Tobacco Use    Smoking status: Former Smoker     Packs/day: 0.50     Years: 15.00     Pack years: 7.50     Types: Cigarettes     Last attempt to quit: 1982     Years since quittin.7    Smokeless tobacco: Former User    Tobacco comment: smoked one pack per week   Substance and Sexual Activity    Alcohol use: No    Drug use: No    Sexual activity: Not Currently     Review of Systems   Constitutional: Negative for chills, fatigue and fever.   Respiratory: Negative for shortness of breath.    Cardiovascular: Positive for leg swelling. Negative for chest pain.   Gastrointestinal: Negative for nausea and vomiting.   Skin: Positive for color change and wound.   Neurological: Positive for numbness.   Psychiatric/Behavioral: Negative for agitation.     Objective:     Vital Signs (Most Recent):  Temp: 97.4 °F (36.3 °C) (19 1218)  Pulse: 80 (19 1218)  Resp: 20 (19 1218)  BP: 136/64 (19 1218)  SpO2: 97 % (19 1218) Vital Signs (24h Range):  Temp:  [97 °F (36.1 °C)-98.4 °F (36.9 °C)] 97.4 °F (36.3 °C)  Pulse:  [74-82] 80  Resp:  [14-20] 20  SpO2:  [95 %-97 %] 97 %  BP: (101-154)/(44-65) 136/64     Weight: (!) 140.6 kg (309 lb 15.5 oz)  Body mass index is 48.55 kg/m².    Foot Exam    General  General Appearance: appears stated age and healthy   Orientation: alert and oriented to  person, place, and time   Affect: appropriate       Right Foot/Ankle     Inspection and Palpation  Ecchymosis: dorsum  Swelling: dorsum   Skin Exam: cellulitis, ulcer and erythema; no drainage     Neurovascular  Dorsalis pedis: 1+  Posterior tibial: absent  Saphenous nerve sensation: diminished  Tibial nerve sensation: diminished  Superficial peroneal nerve sensation: diminished  Deep peroneal nerve sensation: diminished  Sural nerve sensation: diminished      Left Foot/Ankle      Inspection and Palpation  Ecchymosis: dorsum  Swelling: dorsum   Skin Exam: cellulitis, ulcer and erythema; no drainage     Neurovascular  Dorsalis pedis: 1+  Posterior tibial: absent  Saphenous nerve sensation: diminished  Tibial nerve sensation: diminished  Superficial peroneal nerve sensation: diminished  Deep peroneal nerve sensation: diminished  Sural nerve sensation: diminished            Laboratory:  A1C:   Recent Labs   Lab 03/22/19  1744   HGBA1C 9.8*     Blood Cultures:   Recent Labs   Lab 03/22/19  1755 03/22/19  1757   LABBLOO No Growth to date  No Growth to date No Growth to date  No Growth to date     ESR:   Recent Labs   Lab 03/22/19  1744   SEDRATE 90*     Prealbumin: No results for input(s): PREALBUMIN in the last 48 hours.  Wound Cultures:   Recent Labs   Lab 11/21/18  1120 03/22/19  2200 03/23/19  1119   LABAERO PSEUDOMONAS AERUGINOSA  Moderate    KLEBSIELLA OXYTOCA  Few    PROTEUS MIRABILIS  Moderate   PRESUMPTIVE PSEUDOMONAS SPECIES  Many  Identification and susceptibility pending    GRAM NEGATIVE YAIMA  Many  Identification and susceptibility pending  Skin lyndsey also present    GRAM NEGATIVE YAIMA  Moderate  Identification and susceptibility pending    STAPHYLOCOCCUS AUREUS  Many  Susceptibility pending    PRESUMPTIVE PSEUDOMONAS SPECIES  Many  Identification and susceptibility pending    GRAM NEGATIVE YAIMA  Moderate  Identification and susceptibility pending   GRAM NEGATIVE YAIMA  Few  Identification and  susceptibility pending    STAPHYLOCOCCUS AUREUS  Many  Susceptibility pending       Microbiology Results (last 7 days)     Procedure Component Value Units Date/Time    Aerobic culture [[id number]] Collected:  03/22/19 2200    Order Status:  Completed Specimen:  Wound from Toe, Left Foot Updated:  03/24/19 0819     Aerobic Bacterial Culture --     PRESUMPTIVE PSEUDOMONAS SPECIES  Many  Identification and susceptibility pending       Aerobic Bacterial Culture --     GRAM NEGATIVE YAIMA  Many  Identification and susceptibility pending  Skin lyndsey also present      Aerobic culture [[id number]] Collected:  03/23/19 1119    Order Status:  Completed Specimen:  Wound from Foot, Left Updated:  03/24/19 0807     Aerobic Bacterial Culture --     GRAM NEGATIVE YAIMA  Few  Identification and susceptibility pending       Aerobic Bacterial Culture --     STAPHYLOCOCCUS AUREUS  Many  Susceptibility pending      Narrative:       Heel wound    Aerobic culture [[id number]] Collected:  03/22/19 2200    Order Status:  Completed Specimen:  Wound from Foot, Left Updated:  03/24/19 0805     Aerobic Bacterial Culture --     GRAM NEGATIVE YAIMA  Moderate  Identification and susceptibility pending       Aerobic Bacterial Culture --     STAPHYLOCOCCUS AUREUS  Many  Susceptibility pending      Narrative:       Left heel    Aerobic culture [[id number]] Collected:  03/22/19 2200    Order Status:  Completed Specimen:  Wound from Foot, Left Updated:  03/24/19 0744     Aerobic Bacterial Culture --     PRESUMPTIVE PSEUDOMONAS SPECIES  Many  Identification and susceptibility pending       Aerobic Bacterial Culture --     GRAM NEGATIVE YAIMA  Moderate  Identification and susceptibility pending      Narrative:       Left lower calf    Urine culture [[id number]] Collected:  03/22/19 2200    Order Status:  Completed Specimen:  Urine Updated:  03/24/19 0210     Urine Culture, Routine Multiple organisms isolated. None in predominance.  Repeat if     Urine Culture,  Routine clinically necessary.    Narrative:       Preferred Collection Type->Urine, Clean Catch    Blood culture #1 **CANNOT BE ORDERED STAT** [[id number]] Collected:  03/22/19 1755    Order Status:  Completed Specimen:  Blood from Peripheral, Hand, Right Updated:  03/23/19 2012     Blood Culture, Routine No Growth to date     Blood Culture, Routine No Growth to date    Blood culture #2 **CANNOT BE ORDERED STAT** [[id number]] Collected:  03/22/19 1757    Order Status:  Completed Specimen:  Blood from Peripheral, Forearm, Right Updated:  03/23/19 2012     Blood Culture, Routine No Growth to date     Blood Culture, Routine No Growth to date    Culture, Anaerobe [[id number]] Collected:  03/23/19 1119    Order Status:  Sent Specimen:  Wound from Foot, Left Updated:  03/23/19 1217        Specimen (12h ago, onward)    None          Diagnostic Results:  MRI: I have reviewed all pertinent results/findings within the past 24 hours.  reviewed  X-Ray: I have reviewed all pertinent results/findings within the past 24 hours.  reviewed  I have reviewed all pertinent imaging results/findings within the past 24 hours.    Clinical Findings:    Multiple superficial granular wounds noted to yessenia LE with periwound erythema and edema noted. Wounds do not probe to bone. No malodor or purulent drainage noted. No clinical signs of abscess noted. Wound to left heel does not probe to bone. Cellulitis noted to left heel and ankle.                               Assessment/Plan:     * Cellulitis  As above  ABX per ID, appreciate recs  Cultures taken, recommend 2-3 week course of ABX for cellulitis     Acute renal failure superimposed on stage 3 chronic kidney disease  Per primary    Ulcer of left heel and midfoot, limited to breakdown of skin  Wound to left heel with cellulitis noted, probes to superficial fascial layer, does not probe deep/ near bone.   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, consider bone scan to r/o  osteomyelitis, will discuss with staff and discuss with ID  Pulses diminished, DISHA ordered  Wounds to LLE dressed with betadine, abd pad, kerlix, ACE  Nursing orders in to perform daily dressing changes to yessenia LE  Recommend elevation  Upon D/C to f/u with wound care  Podiatry will follow    Uncontrolled type 2 diabetes mellitus with stage 3 chronic kidney disease, with long-term current use of insulin  Per primary    Dermatitis, stasis  Per primary    Venous insufficiency of leg  Per primary    Wheelchair dependent  Pt likely with charcot, NWB to LLE    Diabetic peripheral neuropathy associated with type 2 diabetes mellitus  Per primary        Hyun Hurst MD  Podiatry  [facility]

## 2019-03-25 ENCOUNTER — TELEPHONE (OUTPATIENT)
Dept: INTERNAL MEDICINE | Facility: CLINIC | Age: 74
End: 2019-03-25

## 2019-03-25 LAB
ALBUMIN SERPL BCP-MCNC: 1.7 G/DL (ref 3.5–5.2)
ALP SERPL-CCNC: 185 U/L (ref 55–135)
ALT SERPL W/O P-5'-P-CCNC: 10 U/L (ref 10–44)
ANION GAP SERPL CALC-SCNC: 9 MMOL/L (ref 8–16)
AST SERPL-CCNC: 14 U/L (ref 10–40)
BACTERIA SPEC AEROBE CULT: NORMAL
BASOPHILS # BLD AUTO: 0.02 K/UL (ref 0–0.2)
BASOPHILS NFR BLD: 0.3 % (ref 0–1.9)
BILIRUB SERPL-MCNC: 0.9 MG/DL (ref 0.1–1)
BUN SERPL-MCNC: 20 MG/DL (ref 8–23)
CALCIUM SERPL-MCNC: 8.6 MG/DL (ref 8.7–10.5)
CHLORIDE SERPL-SCNC: 99 MMOL/L (ref 95–110)
CO2 SERPL-SCNC: 27 MMOL/L (ref 23–29)
CREAT SERPL-MCNC: 1.1 MG/DL (ref 0.5–1.4)
DIFFERENTIAL METHOD: ABNORMAL
EOSINOPHIL # BLD AUTO: 0.1 K/UL (ref 0–0.5)
EOSINOPHIL NFR BLD: 1 % (ref 0–8)
ERYTHROCYTE [DISTWIDTH] IN BLOOD BY AUTOMATED COUNT: 13 % (ref 11.5–14.5)
EST. GFR  (AFRICAN AMERICAN): 56.4 ML/MIN/1.73 M^2
EST. GFR  (NON AFRICAN AMERICAN): 48.9 ML/MIN/1.73 M^2
GLUCOSE SERPL-MCNC: 155 MG/DL (ref 70–110)
HCT VFR BLD AUTO: 29.3 % (ref 37–48.5)
HGB BLD-MCNC: 9.6 G/DL (ref 12–16)
IMM GRANULOCYTES # BLD AUTO: 0.03 K/UL (ref 0–0.04)
IMM GRANULOCYTES NFR BLD AUTO: 0.4 % (ref 0–0.5)
LYMPHOCYTES # BLD AUTO: 1 K/UL (ref 1–4.8)
LYMPHOCYTES NFR BLD: 15.2 % (ref 18–48)
MAGNESIUM SERPL-MCNC: 1.5 MG/DL (ref 1.6–2.6)
MCH RBC QN AUTO: 29.1 PG (ref 27–31)
MCHC RBC AUTO-ENTMCNC: 32.8 G/DL (ref 32–36)
MCV RBC AUTO: 89 FL (ref 82–98)
MONOCYTES # BLD AUTO: 0.7 K/UL (ref 0.3–1)
MONOCYTES NFR BLD: 10.2 % (ref 4–15)
NEUTROPHILS # BLD AUTO: 4.9 K/UL (ref 1.8–7.7)
NEUTROPHILS NFR BLD: 72.9 % (ref 38–73)
NRBC BLD-RTO: 0 /100 WBC
PHOSPHATE SERPL-MCNC: 2.4 MG/DL (ref 2.7–4.5)
PLATELET # BLD AUTO: 275 K/UL (ref 150–350)
PMV BLD AUTO: 9 FL (ref 9.2–12.9)
POCT GLUCOSE: 130 MG/DL (ref 70–110)
POCT GLUCOSE: 188 MG/DL (ref 70–110)
POCT GLUCOSE: 203 MG/DL (ref 70–110)
POCT GLUCOSE: 286 MG/DL (ref 70–110)
POTASSIUM SERPL-SCNC: 3.5 MMOL/L (ref 3.5–5.1)
PROT SERPL-MCNC: 6.3 G/DL (ref 6–8.4)
RBC # BLD AUTO: 3.3 M/UL (ref 4–5.4)
SODIUM SERPL-SCNC: 135 MMOL/L (ref 136–145)
VANCOMYCIN TROUGH SERPL-MCNC: 30.1 UG/ML (ref 10–22)
WBC # BLD AUTO: 6.69 K/UL (ref 3.9–12.7)

## 2019-03-25 PROCEDURE — 63600175 PHARM REV CODE 636 W HCPCS: Mod: HCNC | Performed by: STUDENT IN AN ORGANIZED HEALTH CARE EDUCATION/TRAINING PROGRAM

## 2019-03-25 PROCEDURE — 80202 ASSAY OF VANCOMYCIN: CPT | Mod: HCNC

## 2019-03-25 PROCEDURE — 25000003 PHARM REV CODE 250: Mod: HCNC | Performed by: STUDENT IN AN ORGANIZED HEALTH CARE EDUCATION/TRAINING PROGRAM

## 2019-03-25 PROCEDURE — 85025 COMPLETE CBC W/AUTO DIFF WBC: CPT | Mod: HCNC

## 2019-03-25 PROCEDURE — 80053 COMPREHEN METABOLIC PANEL: CPT | Mod: HCNC

## 2019-03-25 PROCEDURE — 99232 SBSQ HOSP IP/OBS MODERATE 35: CPT | Mod: HCNC,GC,, | Performed by: HOSPITALIST

## 2019-03-25 PROCEDURE — 84100 ASSAY OF PHOSPHORUS: CPT | Mod: HCNC

## 2019-03-25 PROCEDURE — 83735 ASSAY OF MAGNESIUM: CPT | Mod: HCNC

## 2019-03-25 PROCEDURE — 11000001 HC ACUTE MED/SURG PRIVATE ROOM: Mod: HCNC

## 2019-03-25 PROCEDURE — 99232 PR SUBSEQUENT HOSPITAL CARE,LEVL II: ICD-10-PCS | Mod: HCNC,GC,, | Performed by: INTERNAL MEDICINE

## 2019-03-25 PROCEDURE — 99232 PR SUBSEQUENT HOSPITAL CARE,LEVL II: ICD-10-PCS | Mod: HCNC,GC,, | Performed by: HOSPITALIST

## 2019-03-25 PROCEDURE — 36415 COLL VENOUS BLD VENIPUNCTURE: CPT | Mod: HCNC

## 2019-03-25 PROCEDURE — 99232 SBSQ HOSP IP/OBS MODERATE 35: CPT | Mod: HCNC,GC,, | Performed by: INTERNAL MEDICINE

## 2019-03-25 PROCEDURE — 63600175 PHARM REV CODE 636 W HCPCS: Mod: HCNC | Performed by: HOSPITALIST

## 2019-03-25 RX ORDER — MAGNESIUM SULFATE HEPTAHYDRATE 40 MG/ML
2 INJECTION, SOLUTION INTRAVENOUS ONCE
Status: COMPLETED | OUTPATIENT
Start: 2019-03-25 | End: 2019-03-25

## 2019-03-25 RX ORDER — POTASSIUM CHLORIDE 20 MEQ/1
40 TABLET, EXTENDED RELEASE ORAL ONCE
Status: COMPLETED | OUTPATIENT
Start: 2019-03-25 | End: 2019-03-25

## 2019-03-25 RX ORDER — VANCOMYCIN HCL IN 5 % DEXTROSE 1G/250ML
1000 PLASTIC BAG, INJECTION (ML) INTRAVENOUS
Status: DISCONTINUED | OUTPATIENT
Start: 2019-03-25 | End: 2019-03-25

## 2019-03-25 RX ORDER — VANCOMYCIN HCL IN 5 % DEXTROSE 1G/250ML
1000 PLASTIC BAG, INJECTION (ML) INTRAVENOUS
Status: DISCONTINUED | OUTPATIENT
Start: 2019-03-26 | End: 2019-03-25

## 2019-03-25 RX ADMIN — HEPARIN SODIUM 5000 UNITS: 5000 INJECTION, SOLUTION INTRAVENOUS; SUBCUTANEOUS at 09:03

## 2019-03-25 RX ADMIN — MEROPENEM AND SODIUM CHLORIDE 1 G: 1 INJECTION, SOLUTION INTRAVENOUS at 11:03

## 2019-03-25 RX ADMIN — INSULIN ASPART 3 UNITS: 100 INJECTION, SOLUTION INTRAVENOUS; SUBCUTANEOUS at 08:03

## 2019-03-25 RX ADMIN — ASPIRIN 81 MG CHEWABLE TABLET 81 MG: 81 TABLET CHEWABLE at 08:03

## 2019-03-25 RX ADMIN — MAGNESIUM SULFATE IN WATER 2 G: 40 INJECTION, SOLUTION INTRAVENOUS at 08:03

## 2019-03-25 RX ADMIN — MEROPENEM AND SODIUM CHLORIDE 1 G: 1 INJECTION, SOLUTION INTRAVENOUS at 02:03

## 2019-03-25 RX ADMIN — POTASSIUM CHLORIDE 40 MEQ: 1500 TABLET, EXTENDED RELEASE ORAL at 08:03

## 2019-03-25 RX ADMIN — HEPARIN SODIUM 5000 UNITS: 5000 INJECTION, SOLUTION INTRAVENOUS; SUBCUTANEOUS at 05:03

## 2019-03-25 RX ADMIN — HEPARIN SODIUM 5000 UNITS: 5000 INJECTION, SOLUTION INTRAVENOUS; SUBCUTANEOUS at 02:03

## 2019-03-25 RX ADMIN — BUMETANIDE 2 MG: 1 TABLET ORAL at 08:03

## 2019-03-25 RX ADMIN — INSULIN ASPART 1 UNITS: 100 INJECTION, SOLUTION INTRAVENOUS; SUBCUTANEOUS at 08:03

## 2019-03-25 RX ADMIN — INSULIN ASPART 3 UNITS: 100 INJECTION, SOLUTION INTRAVENOUS; SUBCUTANEOUS at 12:03

## 2019-03-25 RX ADMIN — INSULIN DETEMIR 12 UNITS: 100 INJECTION, SOLUTION SUBCUTANEOUS at 08:03

## 2019-03-25 RX ADMIN — CLOPIDOGREL 75 MG: 75 TABLET, FILM COATED ORAL at 08:03

## 2019-03-25 RX ADMIN — MICONAZOLE NITRATE: 20 CREAM TOPICAL at 11:03

## 2019-03-25 RX ADMIN — ATORVASTATIN CALCIUM 40 MG: 10 TABLET, FILM COATED ORAL at 08:03

## 2019-03-25 RX ADMIN — INSULIN ASPART 3 UNITS: 100 INJECTION, SOLUTION INTRAVENOUS; SUBCUTANEOUS at 04:03

## 2019-03-25 RX ADMIN — INSULIN ASPART 2 UNITS: 100 INJECTION, SOLUTION INTRAVENOUS; SUBCUTANEOUS at 12:03

## 2019-03-25 RX ADMIN — MEROPENEM AND SODIUM CHLORIDE 1 G: 1 INJECTION, SOLUTION INTRAVENOUS at 08:03

## 2019-03-25 RX ADMIN — MICONAZOLE NITRATE: 20 CREAM TOPICAL at 08:03

## 2019-03-25 NOTE — SUBJECTIVE & OBJECTIVE
Interval History: NAEON. Denies fever / chills.    Review of Systems   Constitutional: Negative for chills, fatigue and fever.   Respiratory: Negative for shortness of breath.    Cardiovascular: Positive for leg swelling. Negative for chest pain.   Gastrointestinal: Negative for nausea and vomiting.   Skin: Positive for color change and wound.   Neurological: Positive for numbness.   Psychiatric/Behavioral: Negative for agitation.     Objective:     Vital Signs (Most Recent):  Temp: 98 °F (36.7 °C) (03/25/19 0803)  Pulse: 75 (03/25/19 0803)  Resp: 15 (03/25/19 0803)  BP: 133/63 (03/25/19 0803)  SpO2: 96 % (03/25/19 0803) Vital Signs (24h Range):  Temp:  [98 °F (36.7 °C)-98.1 °F (36.7 °C)] 98 °F (36.7 °C)  Pulse:  [75-78] 75  Resp:  [15-18] 15  SpO2:  [96 %-98 %] 96 %  BP: (120-164)/(56-69) 133/63     Weight: (!) 139.7 kg (308 lb)  Body mass index is 48.24 kg/m².    Intake/Output Summary (Last 24 hours) at 3/25/2019 1430  Last data filed at 3/25/2019 0700  Gross per 24 hour   Intake 970 ml   Output 1151 ml   Net -181 ml      Physical Exam   Constitutional: She appears well-developed and well-nourished.   Body mass index is 49.65 kg/m².     HENT:   Head: Normocephalic and atraumatic.   Eyes: No scleral icterus.   Neck: Normal range of motion. Neck supple.   Cardiovascular: Normal rate, regular rhythm, normal heart sounds and intact distal pulses.   No murmur heard.  Pulmonary/Chest: Effort normal and breath sounds normal. No respiratory distress. She has no wheezes.   Abdominal: Soft. Bowel sounds are normal. She exhibits no distension. There is no tenderness.   Musculoskeletal:        Right lower leg: She exhibits edema.        Left lower leg: She exhibits edema.   Venous stasis dermatitis with wounds present on lateral aspect of distal lower extremities. Foot wounds covered, wrapped with kerlix.   Neurological: She is alert.   Skin: Skin is warm and dry.   Psychiatric: She has a normal mood and affect. Her behavior  is normal.       Significant Labs:   CBC:   Recent Labs   Lab 03/24/19 0317 03/25/19 0412   WBC 7.66 6.69   HGB 9.1* 9.6*   HCT 28.6* 29.3*    275     CMP:   Recent Labs   Lab 03/24/19 0316 03/25/19 0412   * 135*   K 3.8 3.5    99   CO2 26 27   * 155*   BUN 27* 20   CREATININE 1.3 1.1   CALCIUM 8.6* 8.6*   PROT 6.3 6.3   ALBUMIN 1.8* 1.7*   BILITOT 0.9 0.9   ALKPHOS 161* 185*   AST 12 14   ALT 8* 10   ANIONGAP 7* 9   EGFRNONAA 39.9* 48.9*

## 2019-03-25 NOTE — PROGRESS NOTES
[facility]  Infectious Disease  Progress Note    Patient Name: Sherin Ortiz  MRN: [id number]  Admission Date: 3/22/2019  Length of Stay: 3 days  Attending Physician: Maty Cohen MD  Primary Care Provider: Ame Vasquez MD    Isolation Status: No active isolations  Assessment/Plan:      Ulcer of left heel and midfoot, limited to breakdown of skin  Podiatry with less concern for osteo. MRI inconclusive.     --cultures are polymicrobial  --would recommend working with podiatry to obtain bone biopsy as MRI was inconclusive vs treating like cellulitis.   --continue vanco and meropenem for now  -await final cultures from podiatry   -agree with 2-3 week course          Thank you for your consult. I will follow-up with patient. Please contact us if you have any additional questions.    Jaz Hernandez MD  Infectious Disease  [facility]    Subjective:     Principal Problem:Cellulitis    HPI: 73 yo F w PMHx significant for nonischemic cardiomyopathy resulting in HFpEF, chronic venous stasis resulting in dermatitis and chronic wounds affecting lower extremities, uncontrolled diabetes, HTN, h/o TIA, neuropathy and CKD 3 (baseline Cr of 1.4-1.6)     Pt presents for evaluation of left heel pain of two day duration. Pain had 10/10 intensity and came on abruptly while seated in her wheelchair. Pain felt like deep pressure on the left heel underneath one of her chronic wounds. Pain is exacerbated by transfers from her wheelchair. She cannot think of what may have caused this pain as she denies trauma to the area and even takes care to transfer on inverted feet so as not to apply direct pressure on the wound. She reports clear drainage from the wound, but states this has been an ongoing issue and is not new. She visits with wound care Nurse Lyle to receive treatment of multiple wounds and ulcers on bilateral lower extremities. She states she is adherent to the regiment prescribed  and has been receiving home visits from skilled nursing three times per week. Despite this, she unfortunately has developed a new ulcer on her right leg.      She denies fever, purulent drainage or recent weight loss. Review of systems was positive for nausea with one episode of self-induced vomiting which resolved. Other than transfers, pt does not ambulate.    MRI was done and could not rule out osteo  Interval History: she denies fever, chills, nausea, vomiting.  L leg ulcers are still somewhat painful.      Review of Systems   Constitutional: Negative for chills and fever.   HENT: Negative for sneezing and sore throat.    Eyes: Negative for pain and visual disturbance.   Respiratory: Negative for cough, shortness of breath and wheezing.    Cardiovascular: Negative for chest pain and leg swelling.   Gastrointestinal: Negative for abdominal pain, constipation, diarrhea and nausea.   Genitourinary: Negative for dysuria, frequency and urgency.   Skin: Positive for wound.   Allergic/Immunologic: Negative for environmental allergies.   Neurological: Negative for dizziness, light-headedness, numbness and headaches.   Psychiatric/Behavioral: Negative for agitation. The patient is not nervous/anxious.      Objective:     Vital Signs (Most Recent):  Temp: 98 °F (36.7 °C) (03/25/19 0803)  Pulse: 75 (03/25/19 0803)  Resp: 15 (03/25/19 0803)  BP: 133/63 (03/25/19 0803)  SpO2: 96 % (03/25/19 0803) Vital Signs (24h Range):  Temp:  [98 °F (36.7 °C)-98.1 °F (36.7 °C)] 98 °F (36.7 °C)  Pulse:  [75-78] 75  Resp:  [15-18] 15  SpO2:  [96 %-98 %] 96 %  BP: (120-164)/(56-69) 133/63     Weight: (!) 139.7 kg (308 lb)  Body mass index is 48.24 kg/m².    Estimated Creatinine Clearance: 63.7 mL/min (based on SCr of 1.1 mg/dL).    Physical Exam   Constitutional: She is oriented to person, place, and time. She appears well-developed and well-nourished. No distress.   HENT:   Head: Normocephalic and atraumatic.   Nose: Nose normal.   Eyes:  EOM are normal. No scleral icterus.   Neck: Normal range of motion. No tracheal deviation present.   Cardiovascular: Normal rate and regular rhythm. Exam reveals no gallop and no friction rub.   No murmur heard.  Pulmonary/Chest: Effort normal. No respiratory distress. She has no wheezes. She has no rales.   Abdominal: Soft. Bowel sounds are normal. There is no tenderness.   Abdominal adiposity   Musculoskeletal: She exhibits edema.   Neurological: She is alert and oriented to person, place, and time.   Skin: Skin is warm and dry.   Skin findings consistent with venous stasis, lower extremity wounds are dressed.         Significant Labs:   BMP:   Recent Labs   Lab 03/25/19  0412   *   *   K 3.5   CL 99   CO2 27   BUN 20   CREATININE 1.1   CALCIUM 8.6*   MG 1.5*     CBC:   Recent Labs   Lab 03/24/19  0317 03/25/19  0412   WBC 7.66 6.69   HGB 9.1* 9.6*   HCT 28.6* 29.3*    275       Significant Imaging: I have reviewed all pertinent imaging results/findings within the past 24 hours.

## 2019-03-25 NOTE — PROGRESS NOTES
[facility] Hospital Medicine  Progress Note    Patient Name: Sherin Ortiz  MRN: [id number]  Patient Class: IP- Inpatient   Admission Date: 3/22/2019  Length of Stay: 3 days  Attending Physician: Maty Cohen MD  Primary Care Provider: Ame Vasquez MD    [facility] Team: [facility] HOSP MED 5 Derrick Gore MD    Subjective:     Principal Problem:Cellulitis    HPI:  Ms Ortiz is a 73 yo F w PMHx significant for nonischemic cardiomyopathy resulting in HFpEF, chronic venous stasis resulting in dermatitis and chronic wounds affecting lower extremities, uncontrolled diabetes, HTN, h/o TIA, neuropathy and CKD 3 (baseline Cr of 1.4-1.6)    Pt presents for evaluation of left heel pain of two day duration. Pain had 10/10 intensity and came on abruptly while seated in her wheelchair. Pain felt like deep pressure on the left heel underneath one of her chronic wounds. Pain is exacerbated by transfers from her wheelchair. She cannot think of what may have caused this pain as she denies trauma to the area and even takes care to transfer on inverted feet so as not to apply direct pressure on the wound. She reports clear drainage from the wound, but states this has been an ongoing issue and is not new. She visits with wound care Nurse Lyle to receive treatment of multiple wounds and ulcers on bilateral lower extremities. She states she is adherent to the regiment prescribed and has been receiving home visits from skilled nursing three times per week. Despite this, she unfortunately has developed a new ulcer on her right leg.     She denies fever, purulent drainage or recent weight loss. Review of systems was positive for nausea with one episode of self-induced vomiting which resolved. Other than transfers, pt does not ambulate.    Hospital Course:  Admitted 03/22 with heel pain x2 days consistent with cellulitis. Initial concern for osteomyelitis (ESR, CRP elevated). Given vanc and  meropenem in ED (PCN allergy, hx resistant organisms in wounds). MRI inconclusive regarding presence / absence of osteomyelitis. Podiatry and ID consulted; Podiatry feels infection consistent with cellulitis and recommends 2-3 week course antibiotics which ID agrees with.     On vanc and meropenem until c/s result.    Interval History: NAEON. Denies fever / chills.    Review of Systems   Constitutional: Negative for chills, fatigue and fever.   Respiratory: Negative for shortness of breath.    Cardiovascular: Positive for leg swelling. Negative for chest pain.   Gastrointestinal: Negative for nausea and vomiting.   Skin: Positive for color change and wound.   Neurological: Positive for numbness.   Psychiatric/Behavioral: Negative for agitation.     Objective:     Vital Signs (Most Recent):  Temp: 98 °F (36.7 °C) (03/25/19 0803)  Pulse: 75 (03/25/19 0803)  Resp: 15 (03/25/19 0803)  BP: 133/63 (03/25/19 0803)  SpO2: 96 % (03/25/19 0803) Vital Signs (24h Range):  Temp:  [98 °F (36.7 °C)-98.1 °F (36.7 °C)] 98 °F (36.7 °C)  Pulse:  [75-78] 75  Resp:  [15-18] 15  SpO2:  [96 %-98 %] 96 %  BP: (120-164)/(56-69) 133/63     Weight: (!) 139.7 kg (308 lb)  Body mass index is 48.24 kg/m².    Intake/Output Summary (Last 24 hours) at 3/25/2019 1430  Last data filed at 3/25/2019 0700  Gross per 24 hour   Intake 970 ml   Output 1151 ml   Net -181 ml      Physical Exam   Constitutional: She appears well-developed and well-nourished.   Body mass index is 49.65 kg/m².     HENT:   Head: Normocephalic and atraumatic.   Eyes: No scleral icterus.   Neck: Normal range of motion. Neck supple.   Cardiovascular: Normal rate, regular rhythm, normal heart sounds and intact distal pulses.   No murmur heard.  Pulmonary/Chest: Effort normal and breath sounds normal. No respiratory distress. She has no wheezes.   Abdominal: Soft. Bowel sounds are normal. She exhibits no distension. There is no tenderness.   Musculoskeletal:        Right lower leg:  "She exhibits edema.        Left lower leg: She exhibits edema.   Venous stasis dermatitis with wounds present on lateral aspect of distal lower extremities. Foot wounds covered, wrapped with kerlix.   Neurological: She is alert.   Skin: Skin is warm and dry.   Psychiatric: She has a normal mood and affect. Her behavior is normal.       Significant Labs:   CBC:   Recent Labs   Lab 03/24/19 0317 03/25/19  0412   WBC 7.66 6.69   HGB 9.1* 9.6*   HCT 28.6* 29.3*    275     CMP:   Recent Labs   Lab 03/24/19 0316 03/25/19  0412   * 135*   K 3.8 3.5    99   CO2 26 27   * 155*   BUN 27* 20   CREATININE 1.3 1.1   CALCIUM 8.6* 8.6*   PROT 6.3 6.3   ALBUMIN 1.8* 1.7*   BILITOT 0.9 0.9   ALKPHOS 161* 185*   AST 12 14   ALT 8* 10   ANIONGAP 7* 9   EGFRNONAA 39.9* 48.9*     Assessment/Plan:      * Cellulitis  LEUKOCYTOSIS  VENOUS INSUFFICIENCY OF LEG  DERMATITIS, STASIS  ULCER OF LEFT HEEL AND MIDFOOT    Elderly pt with chronic wound localized to the left heel as a result of chronic venous stasis and diabetes, presents with abrupt-onset left heel pain of two day duration characterized by pressure. Physical exam reveals progression of stage 3 ulcer to stage 4 ulcer. Concerned for progression to osteomyelitis as fat pad appears to be exposed. Previous aerobic culture (11/2018) positive for P aeruginosa, K oxytoca, and P mirabalis, with variable resistance.     MRI hindfoot 03/23 "Destructive/ neuropathic hindfoot joint changes with nonunion calcaneal fracture and extensive reactive marrow edema.  Superimposed osteomyelitis may be present."    ESR, CRP elevated on admit    Micro:   Blood cultures 03/22 NGTD  Superficial wound cultures 03/22: Pseudomonas, other GNR, Staph Aureus  Deep wound cultures 03/23: GNR, Staph Aureus    Plan  - Daily CBC  - Vancomycin and Meropenem per ID recs  - Podiatry recommends treating as cellulitis as wound does not probe to bone, ID agrees. Will keep broad antibiotics until " C/S result and narrow as appropriate, plan 2-3 week course.    Goals of care, counseling/discussion  After CODE status discussion with patient and daughter at bedside regarding risks and goals of advanced resuscitation, pt wishes to be made DNR.       Prolonged Q-T interval on ECG  On presentation to ED, initial EKG with QTc of 511 msec. Appears to be chronic phenomenon.   - Mg lvl as part of RFP  - Avoid anti-emetics unless necessary      Acute renal failure superimposed on stage 3 chronic kidney disease  Pt with chronic kidney disease stage 3 has baseline Cr of 1.4 to 1.6. Currently with Cr of 1.9 concerning for acute renal failure. Received 500 cc NS bolus in emergency department. Pt does not appear septic and has not been hypotensive.   - Strict I's/O's  - Daily weights -- will be difficult as she is wheelchair bound  - Resolved      (HFpEF) heart failure with preserved ejection fraction  Appear stable. No HANDY or orthopnea. JVD did not appear elevated. Last Echo performed in 2014 only revealed diastolic dysfunction without reduction in EF. Tolerates home diuretic regimen.  - Continue home bumex      Hx of transient ischemic attack (TIA)  HYPERLIPIDEMIA  H/o TIA on DAPT with aspirin and clopidogrel. TIA was years ago. May need outpatient follow up with vascular neurologist.   - C/w aspirin 81 mg   - C/w atorvastatin 40 mg   - C/w clopidogrel 75 mg    Uncontrolled type 2 diabetes mellitus with stage 3 chronic kidney disease, with long-term current use of insulin  HYPERGLYCEMIA    Initial blood glucose on arrival was 295 mg/dL. Home regimen consists of glimeperide and nightly insulin lantus 35 units. Most recent A1c 8/2018 was 10.6%.     Plan  - Target -180 mg/dL  - Detemir QHS 12 units  - Levemir 3U TIDAC  - LDSSI   - A1c      Hypoalbuminemia  Baseline ranges from 2.6 to 3.0 will continue to monitor      Anemia of chronic disease  Pt with established diagnosis of anemia of chronic disease. Baseline Hgb of  10.6 to 11.5 g/dL. Currently at baseline.           Diabetic peripheral neuropathy associated with type 2 diabetes mellitus  Pt has peripheral neuropathy characterized by loss of sensation rather than paresthesia and chronic pain. Likely contributed to her chronic wounds.       Morbid obesity with BMI of 40.0-44.9, adult  Diabetic 2000 calorie diet      Weakness  WHEELCHAIR DEPENDENT    - PT/OT      Hyponatremia  Presented with sodium of 129 mmol/L in setting of hyperglycemia. Corrected to 134 mmol/L      Essential hypertension  Pt states she takes both diuretic medications, HCTZ and bumex. She is unsure if she takes her lisinopril any longer. Mildly hypertensive when she arrived in the ED.   - Continue bumetinide 2mg   - Hold HCTZ 25 mg -- consider discontinuation as she is now CKD3      Hyperlipidemia LDL goal <100        VTE Risk Mitigation (From admission, onward)        Ordered     heparin (porcine) injection 5,000 Units  Every 8 hours      03/22/19 2150     IP VTE HIGH RISK PATIENT  Once      03/22/19 2150              Derrick Gore MD  Department of Hospital Medicine   [facility]

## 2019-03-25 NOTE — PROGRESS NOTES
"[facility]  Adult Nutrition  Education Note    Diet Education: Diabetes  Time Spent: 15 min  Learners: Pt      Nutrition Education provided with handouts: Meal Planning Using the Plate Method    Diabetes Medications: glimepiride, insulin    Comments: Per glycemic index committee protocol, diabetes education provided for HbA1c of 9.8. States she has been educated before and has "all the books and cookbooks" but finds it hard to follow as family members often bring food home from restaurants, etc. Provided and explained handout detailing sources of carbohydrates, appropriate serving sizes, and the plate method for meal planning. Pt voiced understanding. All questions and concerns answered. Pt agreeable to [facility] for wound healing.      Follow-Up: 1x weekly    Please consult if other nutrition-related concerns are identified.      "

## 2019-03-25 NOTE — CONSULTS
Consult received on patient's left heel. Podiatry following. Will defer to podiatry. Re-consult if needed.

## 2019-03-25 NOTE — PLAN OF CARE
Problem: Adult Inpatient Plan of Care  Goal: Plan of Care Review  Outcome: Ongoing (interventions implemented as appropriate)     03/25/19 0440   Plan of Care Review   Plan of Care Reviewed With patient     Pt remain free from fall injuries. Tolerated meds well throughout the shift. Blood glucose monitored. Pure week in use. Dressing to BLE. Denies any pain. Bed locked and lowest position. Call light to easy reach. Will monitor.

## 2019-03-25 NOTE — TELEPHONE ENCOUNTER
----- Message from Joana Velasco sent at 3/22/2019 10:56 AM CDT -----  Contact: Diana/ Brittny/ [phone] Ref#[id number]  Company states that they are calling to speak with someone in regards to some paperwork that was sent over to your office for the pt. She states that she needs to know if your office has received the paperwork. Please call back and advise.      Thanks

## 2019-03-25 NOTE — PROGRESS NOTES
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     Thank you,     Hyun Hurst, JOSEM, PGY3  [phone]

## 2019-03-25 NOTE — PLAN OF CARE
Problem: Adult Inpatient Plan of Care  Goal: Plan of Care Review  Outcome: Ongoing (interventions implemented as appropriate)  Pt is AAOX4. Podiatry in to change drsg to BLE. US complete. Mag replaced. IV meropenem and IV vanc given. NAD. VSS. One bm this shift. Pt is bedbound and uses bedpan, assist staff with rolling.

## 2019-03-25 NOTE — SUBJECTIVE & OBJECTIVE
Interval History: she denies fever, chills, nausea, vomiting.  L leg ulcers are still somewhat painful.      Review of Systems   Constitutional: Negative for chills and fever.   HENT: Negative for sneezing and sore throat.    Eyes: Negative for pain and visual disturbance.   Respiratory: Negative for cough, shortness of breath and wheezing.    Cardiovascular: Negative for chest pain and leg swelling.   Gastrointestinal: Negative for abdominal pain, constipation, diarrhea and nausea.   Genitourinary: Negative for dysuria, frequency and urgency.   Skin: Positive for wound.   Allergic/Immunologic: Negative for environmental allergies.   Neurological: Negative for dizziness, light-headedness, numbness and headaches.   Psychiatric/Behavioral: Negative for agitation. The patient is not nervous/anxious.      Objective:     Vital Signs (Most Recent):  Temp: 98 °F (36.7 °C) (03/25/19 0803)  Pulse: 75 (03/25/19 0803)  Resp: 15 (03/25/19 0803)  BP: 133/63 (03/25/19 0803)  SpO2: 96 % (03/25/19 0803) Vital Signs (24h Range):  Temp:  [98 °F (36.7 °C)-98.1 °F (36.7 °C)] 98 °F (36.7 °C)  Pulse:  [75-78] 75  Resp:  [15-18] 15  SpO2:  [96 %-98 %] 96 %  BP: (120-164)/(56-69) 133/63     Weight: (!) 139.7 kg (308 lb)  Body mass index is 48.24 kg/m².    Estimated Creatinine Clearance: 63.7 mL/min (based on SCr of 1.1 mg/dL).    Physical Exam   Constitutional: She is oriented to person, place, and time. She appears well-developed and well-nourished. No distress.   HENT:   Head: Normocephalic and atraumatic.   Nose: Nose normal.   Eyes: EOM are normal. No scleral icterus.   Neck: Normal range of motion. No tracheal deviation present.   Cardiovascular: Normal rate and regular rhythm. Exam reveals no gallop and no friction rub.   No murmur heard.  Pulmonary/Chest: Effort normal. No respiratory distress. She has no wheezes. She has no rales.   Abdominal: Soft. Bowel sounds are normal. There is no tenderness.   Abdominal adiposity    Musculoskeletal: She exhibits edema.   Neurological: She is alert and oriented to person, place, and time.   Skin: Skin is warm and dry.   Skin findings consistent with venous stasis, lower extremity wounds are dressed.         Significant Labs:   BMP:   Recent Labs   Lab 03/25/19  0412   *   *   K 3.5   CL 99   CO2 27   BUN 20   CREATININE 1.1   CALCIUM 8.6*   MG 1.5*     CBC:   Recent Labs   Lab 03/24/19  0317 03/25/19  0412   WBC 7.66 6.69   HGB 9.1* 9.6*   HCT 28.6* 29.3*    275       Significant Imaging: I have reviewed all pertinent imaging results/findings within the past 24 hours.

## 2019-03-25 NOTE — PT/OT/SLP PROGRESS
Physical Therapy   Screen/Discharge    Sherin Ortiz   MRN: [id number]     Per discussion with pt., pt. denies goals/needs for acute PT services. Pt. states she is at her prior level of function with mobility. Pt. uses power chair for mobility and was able to transfer herself on her own. Pt. anticipating going home soon with home health and family assistance. Will discontinue PT at this time.    Damien Edwards, PT  3/25/2019

## 2019-03-26 LAB
ALBUMIN SERPL BCP-MCNC: 1.7 G/DL (ref 3.5–5.2)
ALP SERPL-CCNC: 233 U/L (ref 55–135)
ALT SERPL W/O P-5'-P-CCNC: 12 U/L (ref 10–44)
ANION GAP SERPL CALC-SCNC: 10 MMOL/L (ref 8–16)
AST SERPL-CCNC: 18 U/L (ref 10–40)
BACTERIA SPEC AEROBE CULT: NORMAL
BASOPHILS # BLD AUTO: 0.01 K/UL (ref 0–0.2)
BASOPHILS NFR BLD: 0.2 % (ref 0–1.9)
BILIRUB SERPL-MCNC: 0.6 MG/DL (ref 0.1–1)
BUN SERPL-MCNC: 19 MG/DL (ref 8–23)
CALCIUM SERPL-MCNC: 9.1 MG/DL (ref 8.7–10.5)
CHLORIDE SERPL-SCNC: 97 MMOL/L (ref 95–110)
CO2 SERPL-SCNC: 28 MMOL/L (ref 23–29)
CREAT SERPL-MCNC: 1.1 MG/DL (ref 0.5–1.4)
DIFFERENTIAL METHOD: ABNORMAL
EOSINOPHIL # BLD AUTO: 0.1 K/UL (ref 0–0.5)
EOSINOPHIL NFR BLD: 1.3 % (ref 0–8)
ERYTHROCYTE [DISTWIDTH] IN BLOOD BY AUTOMATED COUNT: 13.1 % (ref 11.5–14.5)
EST. GFR  (AFRICAN AMERICAN): 56.4 ML/MIN/1.73 M^2
EST. GFR  (NON AFRICAN AMERICAN): 48.9 ML/MIN/1.73 M^2
GLUCOSE SERPL-MCNC: 223 MG/DL (ref 70–110)
HCT VFR BLD AUTO: 32.6 % (ref 37–48.5)
HGB BLD-MCNC: 10.3 G/DL (ref 12–16)
IMM GRANULOCYTES # BLD AUTO: 0.02 K/UL (ref 0–0.04)
IMM GRANULOCYTES NFR BLD AUTO: 0.3 % (ref 0–0.5)
LYMPHOCYTES # BLD AUTO: 1.1 K/UL (ref 1–4.8)
LYMPHOCYTES NFR BLD: 18.4 % (ref 18–48)
MAGNESIUM SERPL-MCNC: 1.9 MG/DL (ref 1.6–2.6)
MCH RBC QN AUTO: 28.5 PG (ref 27–31)
MCHC RBC AUTO-ENTMCNC: 31.6 G/DL (ref 32–36)
MCV RBC AUTO: 90 FL (ref 82–98)
MONOCYTES # BLD AUTO: 0.7 K/UL (ref 0.3–1)
MONOCYTES NFR BLD: 11.3 % (ref 4–15)
NEUTROPHILS # BLD AUTO: 4.1 K/UL (ref 1.8–7.7)
NEUTROPHILS NFR BLD: 68.5 % (ref 38–73)
NRBC BLD-RTO: 0 /100 WBC
PHOSPHATE SERPL-MCNC: 2.6 MG/DL (ref 2.7–4.5)
PLATELET # BLD AUTO: 291 K/UL (ref 150–350)
PMV BLD AUTO: 8.8 FL (ref 9.2–12.9)
POCT GLUCOSE: 194 MG/DL (ref 70–110)
POCT GLUCOSE: 199 MG/DL (ref 70–110)
POCT GLUCOSE: 231 MG/DL (ref 70–110)
POCT GLUCOSE: 260 MG/DL (ref 70–110)
POTASSIUM SERPL-SCNC: 3.7 MMOL/L (ref 3.5–5.1)
PROT SERPL-MCNC: 6.8 G/DL (ref 6–8.4)
RBC # BLD AUTO: 3.62 M/UL (ref 4–5.4)
SODIUM SERPL-SCNC: 135 MMOL/L (ref 136–145)
VANCOMYCIN SERPL-MCNC: 26.2 UG/ML
WBC # BLD AUTO: 5.93 K/UL (ref 3.9–12.7)

## 2019-03-26 PROCEDURE — 25000003 PHARM REV CODE 250: Mod: HCNC | Performed by: STUDENT IN AN ORGANIZED HEALTH CARE EDUCATION/TRAINING PROGRAM

## 2019-03-26 PROCEDURE — 63600175 PHARM REV CODE 636 W HCPCS: Mod: HCNC | Performed by: HOSPITALIST

## 2019-03-26 PROCEDURE — 63600175 PHARM REV CODE 636 W HCPCS: Mod: HCNC | Performed by: STUDENT IN AN ORGANIZED HEALTH CARE EDUCATION/TRAINING PROGRAM

## 2019-03-26 PROCEDURE — 11000001 HC ACUTE MED/SURG PRIVATE ROOM: Mod: HCNC

## 2019-03-26 PROCEDURE — 80053 COMPREHEN METABOLIC PANEL: CPT | Mod: HCNC

## 2019-03-26 PROCEDURE — 99232 PR SUBSEQUENT HOSPITAL CARE,LEVL II: ICD-10-PCS | Mod: HCNC,GC,, | Performed by: HOSPITALIST

## 2019-03-26 PROCEDURE — 97530 THERAPEUTIC ACTIVITIES: CPT | Mod: HCNC

## 2019-03-26 PROCEDURE — 83735 ASSAY OF MAGNESIUM: CPT | Mod: HCNC

## 2019-03-26 PROCEDURE — 99232 SBSQ HOSP IP/OBS MODERATE 35: CPT | Mod: HCNC,GC,, | Performed by: HOSPITALIST

## 2019-03-26 PROCEDURE — 80202 ASSAY OF VANCOMYCIN: CPT | Mod: HCNC

## 2019-03-26 PROCEDURE — 99232 PR SUBSEQUENT HOSPITAL CARE,LEVL II: ICD-10-PCS | Mod: HCNC,,, | Performed by: INTERNAL MEDICINE

## 2019-03-26 PROCEDURE — 84100 ASSAY OF PHOSPHORUS: CPT | Mod: HCNC

## 2019-03-26 PROCEDURE — 99232 SBSQ HOSP IP/OBS MODERATE 35: CPT | Mod: HCNC,,, | Performed by: INTERNAL MEDICINE

## 2019-03-26 PROCEDURE — 36415 COLL VENOUS BLD VENIPUNCTURE: CPT | Mod: HCNC

## 2019-03-26 PROCEDURE — 85025 COMPLETE CBC W/AUTO DIFF WBC: CPT | Mod: HCNC

## 2019-03-26 RX ORDER — SODIUM,POTASSIUM PHOSPHATES 280-250MG
2 POWDER IN PACKET (EA) ORAL ONCE
Status: COMPLETED | OUTPATIENT
Start: 2019-03-26 | End: 2019-03-26

## 2019-03-26 RX ADMIN — HEPARIN SODIUM 5000 UNITS: 5000 INJECTION, SOLUTION INTRAVENOUS; SUBCUTANEOUS at 05:03

## 2019-03-26 RX ADMIN — INSULIN ASPART 3 UNITS: 100 INJECTION, SOLUTION INTRAVENOUS; SUBCUTANEOUS at 05:03

## 2019-03-26 RX ADMIN — POTASSIUM & SODIUM PHOSPHATES POWDER PACK 280-160-250 MG 2 PACKET: 280-160-250 PACK at 09:03

## 2019-03-26 RX ADMIN — INSULIN ASPART 3 UNITS: 100 INJECTION, SOLUTION INTRAVENOUS; SUBCUTANEOUS at 01:03

## 2019-03-26 RX ADMIN — MICONAZOLE NITRATE: 20 CREAM TOPICAL at 08:03

## 2019-03-26 RX ADMIN — MEROPENEM AND SODIUM CHLORIDE 1 G: 1 INJECTION, SOLUTION INTRAVENOUS at 11:03

## 2019-03-26 RX ADMIN — INSULIN DETEMIR 12 UNITS: 100 INJECTION, SOLUTION SUBCUTANEOUS at 08:03

## 2019-03-26 RX ADMIN — HEPARIN SODIUM 5000 UNITS: 5000 INJECTION, SOLUTION INTRAVENOUS; SUBCUTANEOUS at 09:03

## 2019-03-26 RX ADMIN — ATORVASTATIN CALCIUM 40 MG: 10 TABLET, FILM COATED ORAL at 08:03

## 2019-03-26 RX ADMIN — CLOPIDOGREL 75 MG: 75 TABLET, FILM COATED ORAL at 09:03

## 2019-03-26 RX ADMIN — BUMETANIDE 2 MG: 1 TABLET ORAL at 09:03

## 2019-03-26 RX ADMIN — MICONAZOLE NITRATE: 20 CREAM TOPICAL at 09:03

## 2019-03-26 RX ADMIN — INSULIN ASPART 1 UNITS: 100 INJECTION, SOLUTION INTRAVENOUS; SUBCUTANEOUS at 08:03

## 2019-03-26 RX ADMIN — MEROPENEM AND SODIUM CHLORIDE 1 G: 1 INJECTION, SOLUTION INTRAVENOUS at 03:03

## 2019-03-26 RX ADMIN — INSULIN ASPART 2 UNITS: 100 INJECTION, SOLUTION INTRAVENOUS; SUBCUTANEOUS at 01:03

## 2019-03-26 RX ADMIN — INSULIN ASPART 3 UNITS: 100 INJECTION, SOLUTION INTRAVENOUS; SUBCUTANEOUS at 08:03

## 2019-03-26 RX ADMIN — ASPIRIN 81 MG CHEWABLE TABLET 81 MG: 81 TABLET CHEWABLE at 09:03

## 2019-03-26 RX ADMIN — HEPARIN SODIUM 5000 UNITS: 5000 INJECTION, SOLUTION INTRAVENOUS; SUBCUTANEOUS at 02:03

## 2019-03-26 RX ADMIN — MEROPENEM AND SODIUM CHLORIDE 1 G: 1 INJECTION, SOLUTION INTRAVENOUS at 07:03

## 2019-03-26 NOTE — PT/OT/SLP PROGRESS
"Occupational Therapy   Treatment and Discharge    Name: Sherin Ortiz  MRN: [id number]  Admitting Diagnosis:  Cellulitis       Recommendations:     Discharge Recommendations: home health OT  Discharge Equipment Recommendations:  none  Barriers to discharge:       Assessment:     Sherin Ortiz is a 74 y.o. female with a medical diagnosis of Cellulitis.  She presents with decreased strength and endurance as limited by hospitalization. During OT visit today, pt explained she "is fine" and politely declined further visits from OT/PT. Pt explained she is at baseline at this time and does not need any therapies to improve fx'l performance.    Rehab Prognosis:  Pt declined continued skilled OT services at this time. Should medical status change, OT/PT consult is appropriate.     Plan:     Patient to be seen 3 x/week to address the above listed problems via self-care/home management, therapeutic activities, therapeutic exercises  · Plan of Care Expires: 04/02/19  · Plan of Care Reviewed with: patient    Subjective     Pain/Comfort:  · Pain Rating 1: 0/10    Objective:     Communicated with: RN prior to session.  Patient found HOB elevated with peripheral IV, telemetry upon OT entry to room. Pt sleeping upon entry.    General Precautions: Standard, fall   Orthopedic Precautions:N/A   Braces: N/A     Occupational Performance:     [facility] 6 Click ADL: 16    Treatment & Education:  Role of OT, POC, and safety during ADLs and fx'l mobility education with pt. Pt was thoroughly education on importance of continued mobility for optimized ADL performance and max encouragement provided for pt to participate. Pt explained she was lethargic and politely declined continued therapy during hospitalization. When encouraged to participate in EOB ax with therapist, pt explained she had been having R shoulder pain "for about two years, they can't figure it out" however she also explained "I can still dress myself and do all that." Pt " "explained she was at her baseline and "had no needs for therapy" at this time.    Patient left HOB elevated with all lines intact and call button in reachEducation:      GOALS:   Multidisciplinary Problems     Occupational Therapy Goals        Problem: Occupational Therapy Goal    Goal Priority Disciplines Outcome Interventions   Occupational Therapy Goal     OT, PT/OT Unable to achieve outcome(s) by discharge    Description:  Goals to be met by: 4/2/2019    Patient will increase functional independence with ADLs by performing:    UE Dressing with Modified [place].  LE Dressing with Stand-by Assistance.  Grooming while seated with Modified [place].  Sitting at edge of bed x15 minutes with Supervision.  Stand pivot transfers with Supervision.  Toilet transfer to bedside commode with Supervision.  Increased functional strength to WFL for self care.  Upper extremity exercise program x8 reps per handout, with independence.                      Time Tracking:     OT Date of Treatment: 03/26/19  OT Start Time: 1052  OT Stop Time: 1102  OT Total Time (min): 10 min    Billable Minutes:Therapeutic Activity 10 min    Claudette Goode OT  3/26/2019    "

## 2019-03-26 NOTE — PLAN OF CARE
[facility]    HOME HEALTH ORDERS  FACE TO FACE ENCOUNTER    Patient Name: Sherin Ortiz  YOB: 1945    PCP: Ame Vasquez MD   [facility] [address] [facility] [address]  PCP Phone Number: [phone]  PCP Fax: [phone]    Encounter Date: 03/28/2019    Admit to Home Health    Diagnoses:  Active Hospital Problems    Diagnosis  POA    *Cellulitis [L03.90]  Yes    Goals of care, counseling/discussion [Z71.89]  Not Applicable    Acute renal failure superimposed on stage 3 chronic kidney disease [N17.9, N18.3]  Yes    Leukocytosis [D72.829]  Yes    Hyperglycemia [R73.9]  Yes    Prolonged Q-T interval on ECG [R94.31]  Yes    Hyperbilirubinemia [E80.6]  Yes    Ulcer of left heel and midfoot, limited to breakdown of skin [L97.421]  Yes    (HFpEF) heart failure with preserved ejection fraction [I50.30]  Yes    Hx of transient ischemic attack (TIA) [Z86.73]  Not Applicable    Uncontrolled type 2 diabetes mellitus with stage 3 chronic kidney disease, with long-term current use of insulin [E11.22, E11.65, N18.3, Z79.4]  Not Applicable     Chronic    Dermatitis, stasis [I87.2]  Yes     Chronic    Venous insufficiency of leg [I87.2]  Yes     Chronic    Wheelchair dependent [Z99.3]  Not Applicable     Chronic    Anemia of chronic disease [D63.8]  Yes    Diabetic peripheral neuropathy associated with type 2 diabetes mellitus [E11.42]  Yes     Chronic    Hypoalbuminemia [E88.09]  Yes    Morbid obesity with BMI of 40.0-44.9, adult [E66.01, Z68.41]  Not Applicable    Weakness [R53.1]  Yes    Hyponatremia [E87.1]  Yes    Essential hypertension [I10]  Yes     Chronic    Hyperlipidemia LDL goal <100 [E78.5]  Yes     Chronic      Resolved Hospital Problems   No resolved problems to display.       Future Appointments   Date Time Provider Department Center   4/12/2019  2:00 PM Akosua Alvarenga NP [facility]   4/12/2019  2:30 PM Teresa Gomez MD [facility] ID  [facility]       I have seen and examined this patient face to face today. My clinical findings that support the need for the home health skilled services and home bound status are the following:  Weakness/numbness causing balance and gait disturbance due to Infection making it taxing to leave home.  Requiring assistive device to leave home due to unsteady gait caused by  Infection.    Allergies:  Review of patient's allergies indicates:   Allergen Reactions    Penicillins Hives     Other reaction(s): Hives    Sulfa (sulfonamide antibiotics) Other (See Comments)     Eyad, pt states her doctor told her the shakes were possibly caused by an allergy to sulfa       Diet: cardiac diet and diabetic diet: 2000 calorie    Activities: activity as tolerated    Nursing:   SN to complete comprehensive assessment including routine vital signs. Instruct on disease process and s/s of complications to report to MD. Review/verify medication list sent home with the patient at time of discharge  and instruct patient/caregiver as needed. Frequency may be adjusted depending on start of care date.    Notify MD if SBP > 160 or < 90; DBP > 90 or < 50; HR > 120 or < 50; Temp > 101, Worsening of skin wounds    CONSULTS:    Occupational Therapy to evaluate and treat. Evaluate home environment for safety and equipment needs. Perform/Instruct on transfers, ADL training, ROM, and therapeutic exercises.    MISCELLANEOUS CARE:  Routine Skin for Bedridden Patients: Instruct patient/caregiver to apply moisture barrier cream to all skin folds and wet areas in perineal area daily and after baths and all bowel movements.  Home Infusion Therapy:   SN to perform Infusion Therapy/Central Line Care.  Review Central Line Care & Central Line Flush with patient.    Administer (drug and dose):   vancomycin 1.25 g in 5 % dextrose 250 mL IVPB   Dose: 1,250 mg  Freq: Every 24 hours (non-standard times) Route: IV  Indications of Use: Skin & soft tissue  Last  Dose: 1,250 mg (03/28/19 1256)  Start: 03/29/19 at 0900    Administer (drug and dose):  meropenem-0.9% sodium chloride 1 g/50 mL IVPB   Dose: 1 g  Freq: Every 8 hours (non-standard times) Route: IV  Indications of Use: Skin & soft tissue  Last Dose: 1 g (03/28/19 1100)  Start: 03/29/19  At 1900    Scrub the Hub: Prior to accessing the line, always perform a 30 second alcohol scrub  Each lumen of the central line is to be flushed at least daily with 10 mL Normal Saline and 3 mL Heparin flush (10 units/mL)  Skilled Nurse (SN) may draw blood from IV access  Blood Draw Procedure:   - Aspirate at least 5 mL of blood   - Discard   - Obtain specimen   - Change injection cap   - Flush with 20 mL Normal Saline followed by a                 3-5 mL Heparin flush (10 units/mL)  Central :   - Sterile dressing changes are done weekly and as needed.   - Use chlor-hexadine scrub to cleanse site, apply Biopatch to insertion site,       apply securement device dressing   - Injection caps are changed weekly and after EVERY lab draw.   - If sterile gauze is under dressing to control oozing,                 dressing change must be performed every 24 hours until gauze is not needed.  Diabetic Care:   SN to perform and educate Diabetic management with blood glucose monitoring:    WOUND CARE ORDERS  yes:  Foot Ulcer:   Wounds to LLE dressed with betadine, abd pad, kerlix, ACE  Recommend elevation  Upon D/C to f/u with wound care    ID:   --Vancomycin and meropenem, x 3 weeks (EOT: 4/12)  --On mondays weekly Vanc trough, ESR, CRP, CBC, CMP, and CRP faxed to [phone]    Medications: Review discharge medications with patient and family and provide education.      Current Discharge Medication List      START taking these medications    Details   meropenem-0.9% sodium chloride 1 gram/50 mL PgBk Inject 50 mLs (1 g total) into the vein every 8 (eight) hours. for 17 days  Qty: 2550 mL, Refills: 0      vancomycin HCl in 5 %  "dextrose (VANCOMYCIN IN 5 % DEXTROSE) 1.25 gram/250 mL Soln Inject 250 mLs (1,250 mg total) into the vein every 12 (twelve) hours. for 16 days  Qty: 8000 mL, Refills: 0         CONTINUE these medications which have NOT CHANGED    Details   ACCU-CHEK RAMIRO PLUS TEST STRP Strp TEST TWICE DAILY  Qty: 200 strip, Refills: 3      ACCU-CHEK SOFTCLIX LANCETS Misc Test twice daily      aspirin 81 MG Chew Take 81 mg by mouth once daily.        atorvastatin (LIPITOR) 40 MG tablet TAKE 1 TABLET EVERY EVENING  Qty: 90 tablet, Refills: 3      BD INSULIN SYRINGE ULTRA-FINE 1/2 mL 30 gauge x 1/2" Syrg USE EVERY NIGHT  Qty: 90 each, Refills: 3      bumetanide (BUMEX) 1 MG tablet Take 2 tablets (2 mg total) by mouth once daily.  Qty: 60 tablet, Refills: 11      clopidogrel (PLAVIX) 75 mg tablet TAKE 1 TABLET EVERY DAY  Qty: 90 tablet, Refills: 3      diclofenac sodium 1 % Gel Apply 2 g topically nightly as needed.  Qty: 1 Tube, Refills: 0    Associated Diagnoses: Stage 3 chronic kidney disease due to type 2 diabetes mellitus; Uncontrolled type 2 diabetes mellitus with stage 3 chronic kidney disease, with long-term current use of insulin; Right shoulder pain, unspecified chronicity      LANTUS U-100 INSULIN 100 unit/mL injection INJECT 7 TO 10 UNITS SUBCUTANEOUSLY IN THE EVENING DEPENDING ON SCALE (DISCARD EACH VIAL AFTER 28 DAYS)  Qty: 30 mL, Refills: 3         STOP taking these medications       glimepiride (AMARYL) 1 MG tablet Comments:   Reason for Stopping:         ibuprofen (ADVIL,MOTRIN) 200 MG tablet Comments:   Reason for Stopping:         ketoconazole (NIZORAL) 2 % cream Comments:   Reason for Stopping:               I certify that this patient is confined to her home and needs intermittent skilled nursing care and occupational therapy.        "

## 2019-03-26 NOTE — PLAN OF CARE
Problem: Adult Inpatient Plan of Care  Goal: Plan of Care Review  Outcome: Ongoing (interventions implemented as appropriate)     03/26/19 7902   Plan of Care Review   Plan of Care Reviewed With patient     Pt remain free from fall and injuries. Dressing to BLE intact and, dry and clean. Denies any pain and discomfort at this time. Tolerated meds well. Continues on IV meds. Vanc trough 30.1 last night, team will adjust future doses. Bed locked and lowest position, Call light to easy reach. Will monitor.

## 2019-03-26 NOTE — PROGRESS NOTES
[facility]  Infectious Disease  Progress Note    Patient Name: Sherin Ortiz  MRN: [id number]  Admission Date: 3/22/2019  Length of Stay: 4 days  Attending Physician: Valorie Saenz MD  Primary Care Provider: Ame Vasquez MD    Isolation Status: Contact  Assessment/Plan:      Ulcer of left heel and midfoot, limited to breakdown of skin  Podiatry with less concern for osteo. MRI inconclusive.     --cultures are polymicrobial  --continue vanco and meropenem to cover all organisms and give her allergies  --would treat as cellulitis x 3 weeks with vanco meropenem  --if her symptoms worsen after this she will likely need more work up for osteo  --ID will sign off  --Please fax weekly cbc, cmp, vanco trough, crp, and esr to ID clinic  --Will arrange follow up  --ID will sign off            Thank you for your consult. I will sign off. Please contact us if you have any additional questions.    Jacob Lai MD  Infectious Disease  [facility]    Subjective:     Principal Problem:Cellulitis    HPI: 73 yo F w PMHx significant for nonischemic cardiomyopathy resulting in HFpEF, chronic venous stasis resulting in dermatitis and chronic wounds affecting lower extremities, uncontrolled diabetes, HTN, h/o TIA, neuropathy and CKD 3 (baseline Cr of 1.4-1.6)     Pt presents for evaluation of left heel pain of two day duration. Pain had 10/10 intensity and came on abruptly while seated in her wheelchair. Pain felt like deep pressure on the left heel underneath one of her chronic wounds. Pain is exacerbated by transfers from her wheelchair. She cannot think of what may have caused this pain as she denies trauma to the area and even takes care to transfer on inverted feet so as not to apply direct pressure on the wound. She reports clear drainage from the wound, but states this has been an ongoing issue and is not new. She visits with wound care Nurse Lyle to receive treatment of multiple  wounds and ulcers on bilateral lower extremities. She states she is adherent to the regiment prescribed and has been receiving home visits from skilled nursing three times per week. Despite this, she unfortunately has developed a new ulcer on her right leg.      She denies fever, purulent drainage or recent weight loss. Review of systems was positive for nausea with one episode of self-induced vomiting which resolved. Other than transfers, pt does not ambulate.    MRI was done and could not rule out osteo  Interval History: she denies fever, chills, nausea, vomiting. No events    Review of Systems   Constitutional: Negative for chills and fever.   HENT: Negative for sneezing and sore throat.    Eyes: Negative for pain and visual disturbance.   Respiratory: Negative for cough, shortness of breath and wheezing.    Cardiovascular: Negative for chest pain and leg swelling.   Gastrointestinal: Negative for abdominal pain, constipation, diarrhea and nausea.   Genitourinary: Negative for dysuria, frequency and urgency.   Skin: Positive for wound.   Allergic/Immunologic: Negative for environmental allergies.   Neurological: Negative for dizziness, light-headedness, numbness and headaches.   Psychiatric/Behavioral: Negative for agitation. The patient is not nervous/anxious.      Objective:     Vital Signs (Most Recent):  Temp: 96.8 °F (36 °C) (03/26/19 1153)  Pulse: 80 (03/26/19 1153)  Resp: 16 (03/26/19 1153)  BP: (!) 147/65 (03/26/19 1153)  SpO2: 96 % (03/26/19 1153) Vital Signs (24h Range):  Temp:  [96.4 °F (35.8 °C)-98.1 °F (36.7 °C)] 96.8 °F (36 °C)  Pulse:  [73-80] 80  Resp:  [14-18] 16  SpO2:  [93 %-98 %] 96 %  BP: (125-153)/(63-90) 147/65     Weight: 126.7 kg (279 lb 5.2 oz)  Body mass index is 43.75 kg/m².    Estimated Creatinine Clearance: 60.2 mL/min (based on SCr of 1.1 mg/dL).    Physical Exam   Constitutional: She is oriented to person, place, and time. She appears well-developed and well-nourished. No  distress.   HENT:   Head: Normocephalic and atraumatic.   Nose: Nose normal.   Eyes: EOM are normal. No scleral icterus.   Neck: Normal range of motion. No tracheal deviation present.   Cardiovascular: Normal rate and regular rhythm. Exam reveals no gallop and no friction rub.   No murmur heard.  Pulmonary/Chest: Effort normal. No respiratory distress. She has no wheezes. She has no rales.   Abdominal: Soft. Bowel sounds are normal. There is no tenderness.   Abdominal adiposity   Musculoskeletal: She exhibits edema.   Neurological: She is alert and oriented to person, place, and time.   Skin: Skin is warm and dry.   Skin findings consistent with venous stasis, lower extremity wounds are dressed.         Significant Labs:   BMP:   Recent Labs   Lab 03/26/19  0344   *   *   K 3.7   CL 97   CO2 28   BUN 19   CREATININE 1.1   CALCIUM 9.1   MG 1.9     CBC:   Recent Labs   Lab 03/25/19  0412 03/26/19  0344   WBC 6.69 5.93   HGB 9.6* 10.3*   HCT 29.3* 32.6*    291       Significant Imaging: I have reviewed all pertinent imaging results/findings within the past 24 hours.

## 2019-03-26 NOTE — SUBJECTIVE & OBJECTIVE
Interval History: NAEON. Patient denies pain, fever / chills.     Review of Systems   Constitutional: Negative for chills, fatigue and fever.   Respiratory: Negative for shortness of breath.    Cardiovascular: Positive for leg swelling. Negative for chest pain.   Gastrointestinal: Negative for nausea and vomiting.   Skin: Positive for color change and wound.   Neurological: Positive for numbness.   Psychiatric/Behavioral: Negative for agitation.     Objective:     Vital Signs (Most Recent):  Temp: 96.8 °F (36 °C) (03/26/19 1153)  Pulse: 80 (03/26/19 1153)  Resp: 16 (03/26/19 1153)  BP: (!) 147/65 (03/26/19 1153)  SpO2: 96 % (03/26/19 1153) Vital Signs (24h Range):  Temp:  [96.4 °F (35.8 °C)-98.1 °F (36.7 °C)] 96.8 °F (36 °C)  Pulse:  [73-80] 80  Resp:  [14-18] 16  SpO2:  [93 %-98 %] 96 %  BP: (125-153)/(63-90) 147/65     Weight: 126.7 kg (279 lb 5.2 oz)  Body mass index is 43.75 kg/m².    Intake/Output Summary (Last 24 hours) at 3/26/2019 1526  Last data filed at 3/26/2019 0908  Gross per 24 hour   Intake 840 ml   Output 1850 ml   Net -1010 ml      Physical Exam   Constitutional: She appears well-developed and well-nourished.   Body mass index is 49.65 kg/m².     HENT:   Head: Normocephalic and atraumatic.   Eyes: No scleral icterus.   Neck: Normal range of motion. Neck supple.   Cardiovascular: Normal rate, regular rhythm, normal heart sounds and intact distal pulses.   No murmur heard.  Pulmonary/Chest: Effort normal and breath sounds normal. No respiratory distress. She has no wheezes.   Abdominal: Soft. Bowel sounds are normal. She exhibits no distension. There is no tenderness.   Musculoskeletal:        Right lower leg: She exhibits edema.        Left lower leg: She exhibits edema.   Venous stasis dermatitis with wounds present on lateral aspect of distal lower extremities. Foot wounds covered, wrapped with kerlix.   Neurological: She is alert.   Skin: Skin is warm and dry.   Psychiatric: She has a normal mood  and affect. Her behavior is normal.       Significant Labs:   CBC:   Recent Labs   Lab 03/25/19  0412 03/26/19  0344   WBC 6.69 5.93   HGB 9.6* 10.3*   HCT 29.3* 32.6*    291     CMP:   Recent Labs   Lab 03/25/19  0412 03/26/19  0344   * 135*   K 3.5 3.7   CL 99 97   CO2 27 28   * 223*   BUN 20 19   CREATININE 1.1 1.1   CALCIUM 8.6* 9.1   PROT 6.3 6.8   ALBUMIN 1.7* 1.7*   BILITOT 0.9 0.6   ALKPHOS 185* 233*   AST 14 18   ALT 10 12   ANIONGAP 9 10   EGFRNONAA 48.9* 48.9*

## 2019-03-26 NOTE — PROGRESS NOTES
[facility] Hospital Medicine  Progress Note    Patient Name: Sherin Ortiz  MRN: [id number]  Patient Class: IP- Inpatient   Admission Date: 3/22/2019  Length of Stay: 4 days  Attending Physician: Valorie Saenz MD  Primary Care Provider: Ame Vasquez MD    [facility]OSP MED 5 Derrick Gore MD    Subjective:     Principal Problem:Cellulitis    HPI:  Ms Ortiz is a 73 yo F w PMHx significant for nonischemic cardiomyopathy resulting in HFpEF, chronic venous stasis resulting in dermatitis and chronic wounds affecting lower extremities, uncontrolled diabetes, HTN, h/o TIA, neuropathy and CKD 3 (baseline Cr of 1.4-1.6)    Pt presents for evaluation of left heel pain of two day duration. Pain had 10/10 intensity and came on abruptly while seated in her wheelchair. Pain felt like deep pressure on the left heel underneath one of her chronic wounds. Pain is exacerbated by transfers from her wheelchair. She cannot think of what may have caused this pain as she denies trauma to the area and even takes care to transfer on inverted feet so as not to apply direct pressure on the wound. She reports clear drainage from the wound, but states this has been an ongoing issue and is not new. She visits with wound care Nurse Llye to receive treatment of multiple wounds and ulcers on bilateral lower extremities. She states she is adherent to the regiment prescribed and has been receiving home visits from skilled nursing three times per week. Despite this, she unfortunately has developed a new ulcer on her right leg.     She denies fever, purulent drainage or recent weight loss. Review of systems was positive for nausea with one episode of self-induced vomiting which resolved. Other than transfers, pt does not ambulate.      Hospital Course:  Admitted 03/22 with heel pain x2 days consistent with cellulitis. Initial concern for osteomyelitis (ESR, CRP elevated). Given vanc and  meropenem in ED (PCN allergy, hx resistant organisms in wounds). MRI inconclusive regarding presence / absence of osteomyelitis. Podiatry and ID consulted; Podiatry feels infection consistent with cellulitis and recommends 2-3 week course antibiotics which ID agrees with.     Interval History: NAEON. Patient denies pain, fever / chills.     Review of Systems   Constitutional: Negative for chills, fatigue and fever.   Respiratory: Negative for shortness of breath.    Cardiovascular: Positive for leg swelling. Negative for chest pain.   Gastrointestinal: Negative for nausea and vomiting.   Skin: Positive for color change and wound.   Neurological: Positive for numbness.   Psychiatric/Behavioral: Negative for agitation.     Objective:     Vital Signs (Most Recent):  Temp: 96.8 °F (36 °C) (03/26/19 1153)  Pulse: 80 (03/26/19 1153)  Resp: 16 (03/26/19 1153)  BP: (!) 147/65 (03/26/19 1153)  SpO2: 96 % (03/26/19 1153) Vital Signs (24h Range):  Temp:  [96.4 °F (35.8 °C)-98.1 °F (36.7 °C)] 96.8 °F (36 °C)  Pulse:  [73-80] 80  Resp:  [14-18] 16  SpO2:  [93 %-98 %] 96 %  BP: (125-153)/(63-90) 147/65     Weight: 126.7 kg (279 lb 5.2 oz)  Body mass index is 43.75 kg/m².    Intake/Output Summary (Last 24 hours) at 3/26/2019 1526  Last data filed at 3/26/2019 0908  Gross per 24 hour   Intake 840 ml   Output 1850 ml   Net -1010 ml      Physical Exam   Constitutional: She appears well-developed and well-nourished.   Body mass index is 49.65 kg/m².     HENT:   Head: Normocephalic and atraumatic.   Eyes: No scleral icterus.   Neck: Normal range of motion. Neck supple.   Cardiovascular: Normal rate, regular rhythm, normal heart sounds and intact distal pulses.   No murmur heard.  Pulmonary/Chest: Effort normal and breath sounds normal. No respiratory distress. She has no wheezes.   Abdominal: Soft. Bowel sounds are normal. She exhibits no distension. There is no tenderness.   Musculoskeletal:        Right lower leg: She exhibits edema.  "       Left lower leg: She exhibits edema.   Venous stasis dermatitis with wounds present on lateral aspect of distal lower extremities. Foot wounds covered, wrapped with kerlix.   Neurological: She is alert.   Skin: Skin is warm and dry.   Psychiatric: She has a normal mood and affect. Her behavior is normal.       Significant Labs:   CBC:   Recent Labs   Lab 03/25/19  0412 03/26/19  0344   WBC 6.69 5.93   HGB 9.6* 10.3*   HCT 29.3* 32.6*    291     CMP:   Recent Labs   Lab 03/25/19  0412 03/26/19  0344   * 135*   K 3.5 3.7   CL 99 97   CO2 27 28   * 223*   BUN 20 19   CREATININE 1.1 1.1   CALCIUM 8.6* 9.1   PROT 6.3 6.8   ALBUMIN 1.7* 1.7*   BILITOT 0.9 0.6   ALKPHOS 185* 233*   AST 14 18   ALT 10 12   ANIONGAP 9 10   EGFRNONAA 48.9* 48.9*     Assessment/Plan:      * Cellulitis  LEUKOCYTOSIS  VENOUS INSUFFICIENCY OF LEG  DERMATITIS, STASIS  ULCER OF LEFT HEEL AND MIDFOOT    Elderly pt with chronic wound localized to the left heel as a result of chronic venous stasis and diabetes, presents with abrupt-onset left heel pain of two day duration characterized by pressure. Physical exam reveals progression of stage 3 ulcer to stage 4 ulcer. Concerned for progression to osteomyelitis as fat pad appears to be exposed. Previous aerobic culture (11/2018) positive for P aeruginosa, K oxytoca, and P mirabalis, with variable resistance.     MRI hindfoot 03/23 "Destructive/ neuropathic hindfoot joint changes with nonunion calcaneal fracture and extensive reactive marrow edema.  Superimposed osteomyelitis may be present."    ESR, CRP elevated on admit    Micro:   Blood cultures 03/22 NGTD  Superficial wound cultures 03/22: Pseudomonas, other GNR, Staph Aureus  Deep wound cultures 03/23: GNR, Staph Aureus    Plan  - Daily CBC  - Vancomycin and Meropenem per ID recs  - Podiatry recommends treating as cellulitis as wound does not probe to bone, ID agrees. Will keep broad antibiotics until C/S result and narrow " as appropriate, plan 2-3 week course.    Goals of care, counseling/discussion  After CODE status discussion with patient and daughter at bedside regarding risks and goals of advanced resuscitation, pt wishes to be made DNR.       Prolonged Q-T interval on ECG  On presentation to ED, initial EKG with QTc of 511 msec. Appears to be chronic phenomenon.   - Mg lvl as part of RFP  - Avoid anti-emetics unless necessary      Acute renal failure superimposed on stage 3 chronic kidney disease  Pt with chronic kidney disease stage 3 has baseline Cr of 1.4 to 1.6. Currently with Cr of 1.9 concerning for acute renal failure. Received 500 cc NS bolus in emergency department. Pt does not appear septic and has not been hypotensive.   - Strict I's/O's  - Daily weights -- will be difficult as she is wheelchair bound  - Resolved      (HFpEF) heart failure with preserved ejection fraction  Appear stable. No HANDY or orthopnea. JVD did not appear elevated. Last Echo performed in 2014 only revealed diastolic dysfunction without reduction in EF. Tolerates home diuretic regimen.  - Continue home bumex      Hx of transient ischemic attack (TIA)  HYPERLIPIDEMIA  H/o TIA on DAPT with aspirin and clopidogrel. TIA was years ago. May need outpatient follow up with vascular neurologist.   - C/w aspirin 81 mg   - C/w atorvastatin 40 mg   - C/w clopidogrel 75 mg    Uncontrolled type 2 diabetes mellitus with stage 3 chronic kidney disease, with long-term current use of insulin  HYPERGLYCEMIA    Initial blood glucose on arrival was 295 mg/dL. Home regimen consists of glimeperide and nightly insulin lantus 35 units. Most recent A1c 8/2018 was 10.6%.     Plan  - Target -180 mg/dL  - Detemir QHS 12 units  - Levemir 3U TIDAC  - LDSSI   - A1c      Hypoalbuminemia  Baseline ranges from 2.6 to 3.0 will continue to monitor      Anemia of chronic disease  Pt with established diagnosis of anemia of chronic disease. Baseline Hgb of 10.6 to 11.5 g/dL.  Currently at baseline.           Diabetic peripheral neuropathy associated with type 2 diabetes mellitus  Pt has peripheral neuropathy characterized by loss of sensation rather than paresthesia and chronic pain. Likely contributed to her chronic wounds.       Morbid obesity with BMI of 40.0-44.9, adult  Diabetic 2000 calorie diet      Weakness  WHEELCHAIR DEPENDENT    - PT/OT      Hyponatremia  Presented with sodium of 129 mmol/L in setting of hyperglycemia. Corrected to 134 mmol/L      Essential hypertension  Pt states she takes both diuretic medications, HCTZ and bumex. She is unsure if she takes her lisinopril any longer. Mildly hypertensive when she arrived in the ED.   - Continue bumetinide 2mg   - Hold HCTZ 25 mg -- consider discontinuation as she is now CKD3      Hyperlipidemia LDL goal <100          VTE Risk Mitigation (From admission, onward)        Ordered     heparin (porcine) injection 5,000 Units  Every 8 hours      03/22/19 2150     IP VTE HIGH RISK PATIENT  Once      03/22/19 2150              Derrick Gore MD  Department of Hospital Medicine   [facility]

## 2019-03-26 NOTE — PLAN OF CARE
Problem: Occupational Therapy Goal  Goal: Occupational Therapy Goal  Goals to be met by: 4/2/2019    Patient will increase functional independence with ADLs by performing:    UE Dressing with Modified [place].  LE Dressing with Stand-by Assistance.  Grooming while seated with Modified [place].  Sitting at edge of bed x15 minutes with Supervision.  Stand pivot transfers with Supervision.  Toilet transfer to bedside commode with Supervision.  Increased functional strength to WFL for self care.  Upper extremity exercise program x8 reps per handout, with independence.     Outcome: Unable to achieve outcome(s) by discharge  Pt declined further OT services during hospitalization at this time. Please consult if medical status changes.  Claudette Goode OT  3/26/2019

## 2019-03-26 NOTE — SUBJECTIVE & OBJECTIVE
Interval History: she denies fever, chills, nausea, vomiting. No events    Review of Systems   Constitutional: Negative for chills and fever.   HENT: Negative for sneezing and sore throat.    Eyes: Negative for pain and visual disturbance.   Respiratory: Negative for cough, shortness of breath and wheezing.    Cardiovascular: Negative for chest pain and leg swelling.   Gastrointestinal: Negative for abdominal pain, constipation, diarrhea and nausea.   Genitourinary: Negative for dysuria, frequency and urgency.   Skin: Positive for wound.   Allergic/Immunologic: Negative for environmental allergies.   Neurological: Negative for dizziness, light-headedness, numbness and headaches.   Psychiatric/Behavioral: Negative for agitation. The patient is not nervous/anxious.      Objective:     Vital Signs (Most Recent):  Temp: 96.8 °F (36 °C) (03/26/19 1153)  Pulse: 80 (03/26/19 1153)  Resp: 16 (03/26/19 1153)  BP: (!) 147/65 (03/26/19 1153)  SpO2: 96 % (03/26/19 1153) Vital Signs (24h Range):  Temp:  [96.4 °F (35.8 °C)-98.1 °F (36.7 °C)] 96.8 °F (36 °C)  Pulse:  [73-80] 80  Resp:  [14-18] 16  SpO2:  [93 %-98 %] 96 %  BP: (125-153)/(63-90) 147/65     Weight: 126.7 kg (279 lb 5.2 oz)  Body mass index is 43.75 kg/m².    Estimated Creatinine Clearance: 60.2 mL/min (based on SCr of 1.1 mg/dL).    Physical Exam   Constitutional: She is oriented to person, place, and time. She appears well-developed and well-nourished. No distress.   HENT:   Head: Normocephalic and atraumatic.   Nose: Nose normal.   Eyes: EOM are normal. No scleral icterus.   Neck: Normal range of motion. No tracheal deviation present.   Cardiovascular: Normal rate and regular rhythm. Exam reveals no gallop and no friction rub.   No murmur heard.  Pulmonary/Chest: Effort normal. No respiratory distress. She has no wheezes. She has no rales.   Abdominal: Soft. Bowel sounds are normal. There is no tenderness.   Abdominal adiposity   Musculoskeletal: She exhibits  edema.   Neurological: She is alert and oriented to person, place, and time.   Skin: Skin is warm and dry.   Skin findings consistent with venous stasis, lower extremity wounds are dressed.         Significant Labs:   BMP:   Recent Labs   Lab 03/26/19  0344   *   *   K 3.7   CL 97   CO2 28   BUN 19   CREATININE 1.1   CALCIUM 9.1   MG 1.9     CBC:   Recent Labs   Lab 03/25/19  0412 03/26/19  0344   WBC 6.69 5.93   HGB 9.6* 10.3*   HCT 29.3* 32.6*    291       Significant Imaging: I have reviewed all pertinent imaging results/findings within the past 24 hours.

## 2019-03-27 LAB
ALBUMIN SERPL BCP-MCNC: 1.7 G/DL (ref 3.5–5.2)
ALP SERPL-CCNC: 225 U/L (ref 55–135)
ALT SERPL W/O P-5'-P-CCNC: 10 U/L (ref 10–44)
ANION GAP SERPL CALC-SCNC: 7 MMOL/L (ref 8–16)
AST SERPL-CCNC: 17 U/L (ref 10–40)
BACTERIA BLD CULT: NORMAL
BACTERIA BLD CULT: NORMAL
BACTERIA SPEC ANAEROBE CULT: NORMAL
BASOPHILS # BLD AUTO: 0.03 K/UL (ref 0–0.2)
BASOPHILS NFR BLD: 0.5 % (ref 0–1.9)
BILIRUB SERPL-MCNC: 0.5 MG/DL (ref 0.1–1)
BUN SERPL-MCNC: 18 MG/DL (ref 8–23)
CALCIUM SERPL-MCNC: 8.8 MG/DL (ref 8.7–10.5)
CHLORIDE SERPL-SCNC: 96 MMOL/L (ref 95–110)
CO2 SERPL-SCNC: 32 MMOL/L (ref 23–29)
CREAT SERPL-MCNC: 1 MG/DL (ref 0.5–1.4)
DIFFERENTIAL METHOD: ABNORMAL
EOSINOPHIL # BLD AUTO: 0.2 K/UL (ref 0–0.5)
EOSINOPHIL NFR BLD: 2.5 % (ref 0–8)
ERYTHROCYTE [DISTWIDTH] IN BLOOD BY AUTOMATED COUNT: 13 % (ref 11.5–14.5)
EST. GFR  (AFRICAN AMERICAN): >60 ML/MIN/1.73 M^2
EST. GFR  (NON AFRICAN AMERICAN): 54.9 ML/MIN/1.73 M^2
GLUCOSE SERPL-MCNC: 168 MG/DL (ref 70–110)
HCT VFR BLD AUTO: 30.2 % (ref 37–48.5)
HGB BLD-MCNC: 9.5 G/DL (ref 12–16)
IMM GRANULOCYTES # BLD AUTO: 0.04 K/UL (ref 0–0.04)
IMM GRANULOCYTES NFR BLD AUTO: 0.6 % (ref 0–0.5)
LYMPHOCYTES # BLD AUTO: 1.3 K/UL (ref 1–4.8)
LYMPHOCYTES NFR BLD: 20.7 % (ref 18–48)
MAGNESIUM SERPL-MCNC: 1.5 MG/DL (ref 1.6–2.6)
MCH RBC QN AUTO: 28.4 PG (ref 27–31)
MCHC RBC AUTO-ENTMCNC: 31.5 G/DL (ref 32–36)
MCV RBC AUTO: 90 FL (ref 82–98)
MONOCYTES # BLD AUTO: 0.7 K/UL (ref 0.3–1)
MONOCYTES NFR BLD: 11.1 % (ref 4–15)
NEUTROPHILS # BLD AUTO: 4.2 K/UL (ref 1.8–7.7)
NEUTROPHILS NFR BLD: 64.6 % (ref 38–73)
NRBC BLD-RTO: 0 /100 WBC
PHOSPHATE SERPL-MCNC: 2.7 MG/DL (ref 2.7–4.5)
PLATELET # BLD AUTO: 300 K/UL (ref 150–350)
PMV BLD AUTO: 8.9 FL (ref 9.2–12.9)
POCT GLUCOSE: 148 MG/DL (ref 70–110)
POCT GLUCOSE: 173 MG/DL (ref 70–110)
POCT GLUCOSE: 353 MG/DL (ref 70–110)
POTASSIUM SERPL-SCNC: 3.7 MMOL/L (ref 3.5–5.1)
PROT SERPL-MCNC: 6.7 G/DL (ref 6–8.4)
RBC # BLD AUTO: 3.34 M/UL (ref 4–5.4)
SODIUM SERPL-SCNC: 135 MMOL/L (ref 136–145)
VANCOMYCIN SERPL-MCNC: 17.3 UG/ML
WBC # BLD AUTO: 6.48 K/UL (ref 3.9–12.7)

## 2019-03-27 PROCEDURE — 63600175 PHARM REV CODE 636 W HCPCS: Mod: HCNC | Performed by: STUDENT IN AN ORGANIZED HEALTH CARE EDUCATION/TRAINING PROGRAM

## 2019-03-27 PROCEDURE — 83735 ASSAY OF MAGNESIUM: CPT | Mod: HCNC

## 2019-03-27 PROCEDURE — 36569 INSJ PICC 5 YR+ W/O IMAGING: CPT | Mod: HCNC

## 2019-03-27 PROCEDURE — C1751 CATH, INF, PER/CENT/MIDLINE: HCPCS | Mod: HCNC

## 2019-03-27 PROCEDURE — 11000001 HC ACUTE MED/SURG PRIVATE ROOM: Mod: HCNC

## 2019-03-27 PROCEDURE — 84100 ASSAY OF PHOSPHORUS: CPT | Mod: HCNC

## 2019-03-27 PROCEDURE — 80202 ASSAY OF VANCOMYCIN: CPT | Mod: HCNC

## 2019-03-27 PROCEDURE — S5571 INSULIN DISPOS PEN 3 ML: HCPCS | Mod: HCNC | Performed by: STUDENT IN AN ORGANIZED HEALTH CARE EDUCATION/TRAINING PROGRAM

## 2019-03-27 PROCEDURE — 76937 US GUIDE VASCULAR ACCESS: CPT | Mod: HCNC

## 2019-03-27 PROCEDURE — 99232 SBSQ HOSP IP/OBS MODERATE 35: CPT | Mod: HCNC,GC,, | Performed by: HOSPITALIST

## 2019-03-27 PROCEDURE — 99232 PR SUBSEQUENT HOSPITAL CARE,LEVL II: ICD-10-PCS | Mod: HCNC,GC,, | Performed by: HOSPITALIST

## 2019-03-27 PROCEDURE — 63600175 PHARM REV CODE 636 W HCPCS: Mod: HCNC | Performed by: HOSPITALIST

## 2019-03-27 PROCEDURE — 25000003 PHARM REV CODE 250: Mod: HCNC | Performed by: STUDENT IN AN ORGANIZED HEALTH CARE EDUCATION/TRAINING PROGRAM

## 2019-03-27 PROCEDURE — 80053 COMPREHEN METABOLIC PANEL: CPT | Mod: HCNC

## 2019-03-27 PROCEDURE — 85025 COMPLETE CBC W/AUTO DIFF WBC: CPT | Mod: HCNC

## 2019-03-27 RX ORDER — INSULIN ASPART 100 [IU]/ML
4 INJECTION, SOLUTION INTRAVENOUS; SUBCUTANEOUS
Status: DISCONTINUED | OUTPATIENT
Start: 2019-03-27 | End: 2019-03-28 | Stop reason: HOSPADM

## 2019-03-27 RX ORDER — VANCOMYCIN HCL IN 5 % DEXTROSE 1.25 G/25
1250 PLASTIC BAG, INJECTION (ML) INTRAVENOUS EVERY 12 HOURS
Qty: 8000 ML | Refills: 0 | Status: SHIPPED | OUTPATIENT
Start: 2019-03-28 | End: 2019-03-28 | Stop reason: HOSPADM

## 2019-03-27 RX ORDER — MEROPENEM AND SODIUM CHLORIDE 1 G/50ML
1 INJECTION, SOLUTION INTRAVENOUS EVERY 8 HOURS
Qty: 2550 ML | Refills: 0 | Status: SHIPPED | OUTPATIENT
Start: 2019-03-27 | End: 2019-04-13

## 2019-03-27 RX ORDER — VANCOMYCIN HCL IN 5 % DEXTROSE 1.25 G/25
1250 PLASTIC BAG, INJECTION (ML) INTRAVENOUS
Status: DISCONTINUED | OUTPATIENT
Start: 2019-03-27 | End: 2019-03-28

## 2019-03-27 RX ORDER — SODIUM CHLORIDE 0.9 % (FLUSH) 0.9 %
10 SYRINGE (ML) INJECTION EVERY 6 HOURS
Status: DISCONTINUED | OUTPATIENT
Start: 2019-03-27 | End: 2019-03-28 | Stop reason: HOSPADM

## 2019-03-27 RX ORDER — SODIUM CHLORIDE 0.9 % (FLUSH) 0.9 %
10 SYRINGE (ML) INJECTION
Status: DISCONTINUED | OUTPATIENT
Start: 2019-03-27 | End: 2019-03-28 | Stop reason: HOSPADM

## 2019-03-27 RX ADMIN — HEPARIN SODIUM 5000 UNITS: 5000 INJECTION, SOLUTION INTRAVENOUS; SUBCUTANEOUS at 06:03

## 2019-03-27 RX ADMIN — MICONAZOLE NITRATE: 20 CREAM TOPICAL at 08:03

## 2019-03-27 RX ADMIN — INSULIN ASPART 4 UNITS: 100 INJECTION, SOLUTION INTRAVENOUS; SUBCUTANEOUS at 05:03

## 2019-03-27 RX ADMIN — ATORVASTATIN CALCIUM 40 MG: 10 TABLET, FILM COATED ORAL at 08:03

## 2019-03-27 RX ADMIN — INSULIN DETEMIR 12 UNITS: 100 INJECTION, SOLUTION SUBCUTANEOUS at 08:03

## 2019-03-27 RX ADMIN — INSULIN ASPART 1 UNITS: 100 INJECTION, SOLUTION INTRAVENOUS; SUBCUTANEOUS at 08:03

## 2019-03-27 RX ADMIN — RAMELTEON 8 MG: 8 TABLET, FILM COATED ORAL at 08:03

## 2019-03-27 RX ADMIN — BUMETANIDE 2 MG: 1 TABLET ORAL at 08:03

## 2019-03-27 RX ADMIN — Medication 1250 MG: at 12:03

## 2019-03-27 RX ADMIN — INSULIN ASPART 5 UNITS: 100 INJECTION, SOLUTION INTRAVENOUS; SUBCUTANEOUS at 05:03

## 2019-03-27 RX ADMIN — ASPIRIN 81 MG CHEWABLE TABLET 81 MG: 81 TABLET CHEWABLE at 08:03

## 2019-03-27 RX ADMIN — CLOPIDOGREL 75 MG: 75 TABLET, FILM COATED ORAL at 08:03

## 2019-03-27 RX ADMIN — MEROPENEM AND SODIUM CHLORIDE 1 G: 1 INJECTION, SOLUTION INTRAVENOUS at 11:03

## 2019-03-27 RX ADMIN — INSULIN ASPART 4 UNITS: 100 INJECTION, SOLUTION INTRAVENOUS; SUBCUTANEOUS at 12:03

## 2019-03-27 RX ADMIN — MEROPENEM AND SODIUM CHLORIDE 1 G: 1 INJECTION, SOLUTION INTRAVENOUS at 08:03

## 2019-03-27 RX ADMIN — MEROPENEM AND SODIUM CHLORIDE 1 G: 1 INJECTION, SOLUTION INTRAVENOUS at 03:03

## 2019-03-27 RX ADMIN — INSULIN ASPART 4 UNITS: 100 INJECTION, SOLUTION INTRAVENOUS; SUBCUTANEOUS at 08:03

## 2019-03-27 RX ADMIN — HEPARIN SODIUM 5000 UNITS: 5000 INJECTION, SOLUTION INTRAVENOUS; SUBCUTANEOUS at 09:03

## 2019-03-27 RX ADMIN — HEPARIN SODIUM 5000 UNITS: 5000 INJECTION, SOLUTION INTRAVENOUS; SUBCUTANEOUS at 03:03

## 2019-03-27 NOTE — PLAN OF CARE
Problem: Adult Inpatient Plan of Care  Goal: Plan of Care Review  Outcome: Ongoing (interventions implemented as appropriate)  POC ongoing and reviewed with the pt and voiced understanding. Contact isolation initiated today. Questions and concerns addressed. Need attended, denies pain. Incontinent care provided, repositioned for comfort. Safety maintained.

## 2019-03-27 NOTE — PROGRESS NOTES
[facility] Hospital Medicine  Progress Note    Patient Name: Sherin Ortiz  MRN: [id number]  Patient Class: IP- Inpatient   Admission Date: 3/22/2019  Length of Stay: 5 days  Attending Physician: Valorie Saenz MD  Primary Care Provider: Ame Vasquez MD    [place] [facility] Medicine Team: [facility] HOSP MED 5 Derrick Gore MD    Subjective:     Principal Problem:Cellulitis    HPI:  Ms Ortiz is a 75 yo F w PMHx significant for nonischemic cardiomyopathy resulting in HFpEF, chronic venous stasis resulting in dermatitis and chronic wounds affecting lower extremities, uncontrolled diabetes, HTN, h/o TIA, neuropathy and CKD 3 (baseline Cr of 1.4-1.6)    Pt presents for evaluation of left heel pain of two day duration. Pain had 10/10 intensity and came on abruptly while seated in her wheelchair. Pain felt like deep pressure on the left heel underneath one of her chronic wounds. Pain is exacerbated by transfers from her wheelchair. She cannot think of what may have caused this pain as she denies trauma to the area and even takes care to transfer on inverted feet so as not to apply direct pressure on the wound. She reports clear drainage from the wound, but states this has been an ongoing issue and is not new. She visits with wound care Nurse Lyle to receive treatment of multiple wounds and ulcers on bilateral lower extremities. She states she is adherent to the regiment prescribed and has been receiving home visits from skilled nursing three times per week. Despite this, she unfortunately has developed a new ulcer on her right leg.     She denies fever, purulent drainage or recent weight loss. Review of systems was positive for nausea with one episode of self-induced vomiting which resolved. Other than transfers, pt does not ambulate.      Hospital Course:  Admitted 03/22 with heel pain x2 days consistent with cellulitis. Initial concern for osteomyelitis (ESR, CRP elevated). Given vanc and  meropenem in ED (PCN allergy, hx resistant organisms in wounds). MRI inconclusive regarding presence / absence of osteomyelitis. Podiatry and ID consulted; Podiatry feels infection consistent with cellulitis and recommends 2-3 week course antibiotics which ID agrees with.     03/27/2019 GRACIELA. PICC placed in am.     Interval History: See hospital course    Review of Systems   Constitutional: Negative for chills, fatigue and fever.   Respiratory: Negative for shortness of breath.    Cardiovascular: Positive for leg swelling. Negative for chest pain.   Gastrointestinal: Negative for nausea and vomiting.   Skin: Positive for color change and wound.   Neurological: Positive for numbness.   Psychiatric/Behavioral: Negative for agitation.     Objective:     Vital Signs (Most Recent):  Temp: 98.2 °F (36.8 °C) (03/27/19 1206)  Pulse: 63 (03/27/19 1206)  Resp: 16 (03/27/19 1206)  BP: (!) 146/66 (03/27/19 1206)  SpO2: 95 % (03/27/19 1206) Vital Signs (24h Range):  Temp:  [97.2 °F (36.2 °C)-98.7 °F (37.1 °C)] 98.2 °F (36.8 °C)  Pulse:  [63-74] 63  Resp:  [16-18] 16  SpO2:  [95 %-96 %] 95 %  BP: (117-151)/(54-71) 146/66     Weight: 126.6 kg (279 lb)  Body mass index is 43.7 kg/m².    Intake/Output Summary (Last 24 hours) at 3/27/2019 1448  Last data filed at 3/27/2019 0653  Gross per 24 hour   Intake 390 ml   Output 2100 ml   Net -1710 ml      Physical Exam   Constitutional: She appears well-developed and well-nourished.   Body mass index is 49.65 kg/m².     HENT:   Head: Normocephalic and atraumatic.   Eyes: No scleral icterus.   Neck: Normal range of motion. Neck supple.   Cardiovascular: Normal rate, regular rhythm, normal heart sounds and intact distal pulses.   No murmur heard.  Pulmonary/Chest: Effort normal and breath sounds normal. No respiratory distress. She has no wheezes.   Abdominal: Soft. Bowel sounds are normal. She exhibits no distension. There is no tenderness.   Musculoskeletal:        Right lower leg: She  "exhibits edema.        Left lower leg: She exhibits edema.   Venous stasis dermatitis with wounds present on lateral aspect of distal lower extremities. Foot wounds covered, wrapped with kerlix.   Neurological: She is alert.   Skin: Skin is warm and dry.   Psychiatric: She has a normal mood and affect. Her behavior is normal.       Assessment/Plan:      * Cellulitis  Elderly pt with chronic wound localized to the left heel as a result of chronic venous stasis and diabetes, presents with abrupt-onset left heel pain of two day duration characterized by pressure. Physical exam reveals progression of stage 3 ulcer to stage 4 ulcer. Concerned for progression to osteomyelitis as fat pad appears to be exposed. Previous aerobic culture (11/2018) positive for P aeruginosa, K oxytoca, and P mirabalis, with variable resistance.     MRI hindfoot 03/23 "Destructive/ neuropathic hindfoot joint changes with nonunion calcaneal fracture and extensive reactive marrow edema.  Superimposed osteomyelitis may be present."    ESR, CRP elevated on admit    Micro:   Blood cultures 03/22 NGTD  wound cultures 03/22: Polymicrobial incl MRSA, pseudomonas    Plan  --cultures are polymicrobial  --continue vanco and meropenem to cover all organisms and given her allergies  --treat as cellulitis x 3 weeks with vanc and mo  --if her symptoms worsen after this she will likely need more work up for osteo        Goals of care, counseling/discussion  After CODE status discussion with patient and daughter at bedside regarding risks and goals of advanced resuscitation, pt wishes to be made DNR.       Prolonged Q-T interval on ECG  On presentation to ED, initial EKG with QTc of 511 msec. Appears to be chronic phenomenon.   - Mg lvl as part of RFP  - Avoid anti-emetics unless necessary      Acute renal failure superimposed on stage 3 chronic kidney disease  Pt with chronic kidney disease stage 3 has baseline Cr of 1.4 to 1.6. Currently with Cr of 1.9 " concerning for acute renal failure. Received 500 cc NS bolus in emergency department. Pt does not appear septic and has not been hypotensive.   - Strict I's/O's  - Daily weights -- will be difficult as she is wheelchair bound  - Resolved      (HFpEF) heart failure with preserved ejection fraction  Appear stable. No HANDY or orthopnea. JVD did not appear elevated. Last Echo performed in 2014 only revealed diastolic dysfunction without reduction in EF. Tolerates home diuretic regimen.  - Continue home bumex      Hx of transient ischemic attack (TIA)  HYPERLIPIDEMIA  H/o TIA on DAPT with aspirin and clopidogrel. TIA was years ago. May need outpatient follow up with vascular neurologist.   - C/w aspirin 81 mg   - C/w atorvastatin 40 mg   - C/w clopidogrel 75 mg    Uncontrolled type 2 diabetes mellitus with stage 3 chronic kidney disease, with long-term current use of insulin  HYPERGLYCEMIA    Initial blood glucose on arrival was 295 mg/dL. Home regimen consists of glimeperide and nightly insulin lantus 35 units. Most recent A1c 8/2018 was 10.6%.     Plan  - Target -180 mg/dL  - Detemir QHS 12 units  - Levemir 3U TIDAC  - LDSSI   - A1c      Hypoalbuminemia  Baseline ranges from 2.6 to 3.0 will continue to monitor      Anemia of chronic disease  Pt with established diagnosis of anemia of chronic disease. Baseline Hgb of 10.6 to 11.5 g/dL. Currently at baseline.           Diabetic peripheral neuropathy associated with type 2 diabetes mellitus  Pt has peripheral neuropathy characterized by loss of sensation rather than paresthesia and chronic pain. Likely contributed to her chronic wounds.       Morbid obesity with BMI of 40.0-44.9, adult  Diabetic 2000 calorie diet      Weakness  WHEELCHAIR DEPENDENT    - PT/OT      Hyponatremia  Presented with sodium of 129 mmol/L in setting of hyperglycemia. Corrected to 134 mmol/L      Essential hypertension  Pt states she takes both diuretic medications, HCTZ and bumex. She is  unsure if she takes her lisinopril any longer. Mildly hypertensive when she arrived in the ED.   - Continue bumetinide 2mg   - Hold HCTZ 25 mg -- consider discontinuation as she is now CKD3      Hyperlipidemia LDL goal <100          VTE Risk Mitigation (From admission, onward)        Ordered     heparin (porcine) injection 5,000 Units  Every 8 hours      03/22/19 2150     IP VTE HIGH RISK PATIENT  Once      03/22/19 2150              Derrick Gore MD  Department of Hospital Medicine   [facility]

## 2019-03-27 NOTE — PLAN OF CARE
03/27/19 1557   Post-Acute Status   Post-Acute Authorization Medications   Medication Status   (SW faxed referral to [facility] via  for review. SW will follow up. )     Shelbi Grover LMSW   - [facility]  Ext. [phone]

## 2019-03-27 NOTE — PROCEDURES
"Sherin Ortiz is a 74 y.o. female patient.    Temp: 98.4 °F (36.9 °C) (03/27/19 0846)  Pulse: 72 (03/27/19 0846)  Resp: 17 (03/27/19 0846)  BP: 138/62 (03/27/19 0846)  SpO2: 95 % (03/27/19 0846)  Weight: 126.6 kg (279 lb) (03/27/19 0400)  Height: 5' 7" (170.2 cm) (03/22/19 1559)    PICC  Date/Time: 3/27/2019 11:40 AM  Performed by: Lino Carbajal RN  Consent Done: Yes  Time out: Immediately prior to procedure a time out was called to verify the correct patient, procedure, equipment, support staff and site/side marked as required  Indications: med administration and vascular access  Anesthesia: local infiltration  Local anesthetic: lidocaine 1% without epinephrine  Anesthetic Total (mL): 2  Preparation: skin prepped with ChloraPrep  Skin prep agent dried: skin prep agent completely dried prior to procedure  Sterile barriers: all five maximum sterile barriers used - cap, mask, sterile gown, sterile gloves, and large sterile sheet  Hand hygiene: hand hygiene performed prior to central venous catheter insertion  Location details: right brachial  Catheter type: double lumen  Catheter size: 5 Fr  Catheter Length: 41cm    Ultrasound guidance: yes  Vessel Caliber: medium and patent, compressibility normal  Vascular Doppler: not done  Needle advanced into vessel with real time Ultrasound guidance.  Guidewire confirmed in vessel.  Image recorded and saved.  Sterile sheath used.  Number of attempts: 1  Post-procedure: blood return through all ports, chlorhexidine patch and sterile dressing applied  Technical procedures used: 3CG  Specimens: No  Implants: No  Assessment: placement verified by x-ray  Complications: none          Eloise Holt  3/27/2019    "

## 2019-03-27 NOTE — ASSESSMENT & PLAN NOTE
"Elderly pt with chronic wound localized to the left heel as a result of chronic venous stasis and diabetes, presents with abrupt-onset left heel pain of two day duration characterized by pressure. Physical exam reveals progression of stage 3 ulcer to stage 4 ulcer. Concerned for progression to osteomyelitis as fat pad appears to be exposed. Previous aerobic culture (11/2018) positive for P aeruginosa, K oxytoca, and P mirabalis, with variable resistance.     MRI hindfoot 03/23 "Destructive/ neuropathic hindfoot joint changes with nonunion calcaneal fracture and extensive reactive marrow edema.  Superimposed osteomyelitis may be present."    ESR, CRP elevated on admit    Micro:   Blood cultures 03/22 NGTD  wound cultures 03/22: Polymicrobial incl MRSA, pseudomonas    Plan  --cultures are polymicrobial  --continue vanco and meropenem to cover all organisms and given her allergies  --treat as cellulitis x 3 weeks with vanc and mo  --if her symptoms worsen after this she will likely need more work up for osteo      "
"LEUKOCYTOSIS  VENOUS INSUFFICIENCY OF LEG  DERMATITIS, STASIS  ULCER OF LEFT HEEL AND MIDFOOT    Elderly pt with chronic wound localized to the left heel as a result of chronic venous stasis and diabetes, presents with abrupt-onset left heel pain of two day duration characterized by pressure. Physical exam reveals progression of stage 3 ulcer to stage 4 ulcer. Concerned for progression to osteomyelitis as fat pad appears to be exposed. Previous aerobic culture (11/2018) positive for P aeruginosa, K oxytoca, and P mirabalis, with variable resistance.     MRI hindfoot 03/23 "Destructive/ neuropathic hindfoot joint changes with nonunion calcaneal fracture and extensive reactive marrow edema.  Superimposed osteomyelitis may be present."    ESR, CRP elevated on admit    Micro:   Blood cultures 03/22 NGTD  Superficial wound cultures 03/22: Pseudomonas, other GNR, Staph Aureus  Deep wound cultures 03/23: GNR, Staph Aureus    Plan  - Daily CBC  - MRI hindfoot without contrast d/t LOREN  - Vancomycin, 2g q24 (CrCl of 37.6; weight 144 kg)  - Meropenem 1g q8   - Podiatry recommends treating as cellulitis as wound does not probe to bone, ID agrees. Will keep broad antibiotics until C/S result and narrow as appropriate, plan 2-3 week course.  "
"LEUKOCYTOSIS  VENOUS INSUFFICIENCY OF LEG  DERMATITIS, STASIS  ULCER OF LEFT HEEL AND MIDFOOT    Elderly pt with chronic wound localized to the left heel as a result of chronic venous stasis and diabetes, presents with abrupt-onset left heel pain of two day duration characterized by pressure. Physical exam reveals progression of stage 3 ulcer to stage 4 ulcer. Concerned for progression to osteomyelitis as fat pad appears to be exposed. Previous aerobic culture (11/2018) positive for P aeruginosa, K oxytoca, and P mirabalis, with variable resistance.     MRI hindfoot 03/23 "Destructive/ neuropathic hindfoot joint changes with nonunion calcaneal fracture and extensive reactive marrow edema.  Superimposed osteomyelitis may be present."    ESR, CRP elevated on admit    Micro:   Blood cultures 03/22 NGTD  Superficial wound cultures 03/22: Pseudomonas, other GNR, Staph Aureus  Deep wound cultures 03/23: GNR, Staph Aureus    Plan  - Daily CBC  - Vancomycin and Meropenem per ID recs  - Podiatry recommends treating as cellulitis as wound does not probe to bone, ID agrees. Will keep broad antibiotics until C/S result and narrow as appropriate, plan 2-3 week course.  "
After CODE status discussion with patient and daughter at bedside regarding risks and goals of advanced resuscitation, pt wishes to be made DNR.     
Appear stable. No HANDY or orthopnea. JVD did not appear elevated. Last Echo performed in 2014 only revealed diastolic dysfunction without reduction in EF. Tolerates home diuretic regimen.    Plan  - Bumetanide 2mg qd    
Appear stable. No HANDY or orthopnea. JVD did not appear elevated. Last Echo performed in 2014 only revealed diastolic dysfunction without reduction in EF. Tolerates home diuretic regimen.  - Continue home bumex    
As above  ABX per primary  Cultures taken, recommend 2-3 week course of ABX for cellulitis   
As above  ABX per primary  Cultures taken, recommend 2-3 week course of ABX for cellulitis   
Baseline ranges from 2.6 to 3.0 will continue to monitor    
Concern for osteo. MRI could not rule it out    -would pursue a bone biopsy with path and culture  -agree with holding antibiotics for now  
Diabetic 2000 calorie diet    
HYPERBILIRUBINEMIA    Alkaline phosphatase elevation with elevation in bilirubin in pt presenting with episode of nausea and emesis.     Plan  - CMP  - Abdominal ultrasound     
HYPERGLYCEMIA    Initial blood glucose on arrival was 295 mg/dL. Home regimen consists of glimeperide and nightly insulin lantus 35 units. Most recent A1c 8/2018 was 10.6%.     Plan  - Target -180 mg/dL  - Detemir QHS 12 units  - Levemir 3U TIDAC  - LDSSI   - A1c    
HYPERGLYCEMIA    Initial blood glucose on arrival was 295 mg/dL. Home regimen consists of glimeperide and nightly insulin lantus 35 units. Most recent A1c 8/2018 was 10.6%.     Plan  - Target -180 mg/dL  - Detemir QHS 12 units  - Levemir 4U TIDAC  - LDSSI     
HYPERGLYCEMIA    Initial blood glucose on arrival was 295 mg/dL. Home regimen consists of glimeperide and nightly insulin lantus 35 units. Most recent A1c 8/2018 was 10.6%.     Plan  - Target -180 mg/dL  - Detemir QHS 15 units  - Moderate SSI   - A1c    
HYPERLIPIDEMIA    H/o TIA on DAPT with aspirin and clopidogrel. TIA was years ago. May need outpatient follow up with vascular neurologist.     - C/w aspirin 81 mg   - C/w atorvastatin 40 mg   - C/w clopidogrel 75 mg  
HYPERLIPIDEMIA  H/o TIA on DAPT with aspirin and clopidogrel. TIA was years ago. May need outpatient follow up with vascular neurologist.   - C/w aspirin 81 mg   - C/w atorvastatin 40 mg   - C/w clopidogrel 75 mg  
LEUKOCYTOSIS  VENOUS INSUFFICIENCY OF LEG  DERMATITIS, STASIS  ULCER OF LEFT HEEL AND MIDFOOT    Elderly pt with chronic wound localized to the left heel as a result of chronic venous stasis and diabetes, presents with abrupt-onset left heel pain of two day duration characterized by pressure. Physical exam reveals progression of stage 3 ulcer to stage 4 ulcer. Concerned for progression to osteomyelitis as fat pad appears to be exposed. Previous aerobic culture (11/2018) positive for P aeruginosa, K oxytoca, and P mirabalis, with variable resistance.     Plan  - ESR, CRP   - Daily CBC  - MRI hindfoot without contrast d/t LOREN  - Vancomycin, 2g q24 (CrCl of 37.6; weight 144 kg)  - Meropenem 1g q12   - Consult infectious disease  - Consult podiatry            
On presentation to ED, initial EKG with QTc of 511 msec. Appears to be chronic phenomenon.     Plan  - Mg lvl as part of RFP  - Avoid anti-emetics unless necessary    
On presentation to ED, initial EKG with QTc of 511 msec. Appears to be chronic phenomenon.   - Avoid anti-emetics unless necessary    
On presentation to ED, initial EKG with QTc of 511 msec. Appears to be chronic phenomenon.   - Mg lvl as part of RFP  - Avoid anti-emetics unless necessary    
Per primary  
Podiatry with less concern for osteo. MRI inconclusive.     --cultures are polymicrobial  --continue vanco and meropenem to cover all organisms and give her allergies  --would treat as cellulitis x 3 weeks with vanco meropenem  --if her symptoms worsen after this she will likely need more work up for osteo  --ID will sign off  --Please fax weekly cbc, cmp, vanco trough, crp, and esr to ID clinic  --Will arrange follow up  --ID will sign off  
Podiatry with less concern for osteo. MRI inconclusive.     --cultures are polymicrobial  --would recommend working with podiatry to obtain bone biopsy as MRI was inconclusive vs treating like cellulitis.   --continue vanco and meropenem for now  -await final cultures from podiatry   -agree with 2-3 week course  
Podiatry with less concern for osteo. MRI inconclusive. Agree with treating as cellulitis     -continue vanco and meropenem  -await final cultures from podiatry   -agree with 2-3 week course  
Presented with sodium of 129 mmol/L in setting of hyperglycemia. Corrected to 134 mmol/L    
Pt has peripheral neuropathy characterized by loss of sensation rather than paresthesia and chronic pain. Likely contributed to her chronic wounds.     
Pt likely with guru, NWB to LLE  
Pt states she takes both diuretic medications, HCTZ and bumex. She is unsure if she takes her lisinopril any longer. Mildly hypertensive when she arrived in the ED.     Plan  - Ask pharmacy for med rec  - Continue bumetinide 2mg   - Hold HCTZ 25 mg -- consider discontinuation as she is now CKD3    
Pt states she takes both diuretic medications, HCTZ and bumex. She is unsure if she takes her lisinopril any longer. Mildly hypertensive when she arrived in the ED.   - Continue bumetinide 2mg   - Hold HCTZ 25 mg -- consider discontinuation as she is now CKD3    
Pt with chronic kidney disease stage 3 has baseline Cr of 1.4 to 1.6. Currently with Cr of 1.9 concerning for acute renal failure. Received 500 cc NS bolus in emergency department. Pt does not appear septic and has not been hypotensive.     Plan  - Daily RFP  - Urinalysis  - FEUrea d/t bumetanide  - RP U/S for hydronephrosis  - Renally dose medications  - Avoid nephrotoxic agents  - Avoid relative hypotension   - Aim for SBP between 120 and 135 mmHg  - Strict I's/O's  - Daily weights -- will be difficult as she is wheelchair bound    
Pt with chronic kidney disease stage 3 has baseline Cr of 1.4 to 1.6. Currently with Cr of 1.9 concerning for acute renal failure. Received 500 cc NS bolus in emergency department. Pt does not appear septic and has not been hypotensive.   - Strict I's/O's  - Daily weights -- will be difficult as she is wheelchair bound  - Resolved    
Pt with established diagnosis of anemia of chronic disease. Baseline Hgb of 10.6 to 11.5 g/dL        
Pt with established diagnosis of anemia of chronic disease. Baseline Hgb of 10.6 to 11.5 g/dL. Currently at baseline.         
WHEELCHAIR DEPENDENT    - PT/OT    
Wound to left heel with cellulitis noted, does not probe deep  Imaging reviewed, no evidence of clinical OM noted  Cultures taken, orders placed  Recommend 2-3 week course of ABX for cellulitis  Venous US ordered to r/o DVT  Wounds dressed with betadine, abd pad, kerlix, ACE  Nursing orders in to perform daily dressing changes  Recommend elevation  Upon D/C to f/u with wound care  Podiatry will follow  
Wound to left heel with cellulitis noted, probes to superficial fascial layer, does not probe deep/ near bone.   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  
VIEW ALL

## 2019-03-27 NOTE — PLAN OF CARE
Problem: Adult Inpatient Plan of Care  Goal: Plan of Care Review  Outcome: Ongoing (interventions implemented as appropriate)     03/27/19 0310   Plan of Care Review   Plan of Care Reviewed With patient     Pt remain free from fall and injures at this time. On contact isolation. Dressing to BLE intact. VSS. Continues on IV ABX. Blood glucose monitored and coverage given. VSS. Bed locked and lowest position. Call light to easy reach. Will monitor.

## 2019-03-27 NOTE — CONSULTS
Placed double lumen PICC to right brachial vein using u/s guidance.  41 cm in length, 41 cm arm circumference and 0 cm exposed.   Lot # [id number].

## 2019-03-27 NOTE — SUBJECTIVE & OBJECTIVE
Interval History: See hospital course    Review of Systems   Constitutional: Negative for chills, fatigue and fever.   Respiratory: Negative for shortness of breath.    Cardiovascular: Positive for leg swelling. Negative for chest pain.   Gastrointestinal: Negative for nausea and vomiting.   Skin: Positive for color change and wound.   Neurological: Positive for numbness.   Psychiatric/Behavioral: Negative for agitation.     Objective:     Vital Signs (Most Recent):  Temp: 98.2 °F (36.8 °C) (03/27/19 1206)  Pulse: 63 (03/27/19 1206)  Resp: 16 (03/27/19 1206)  BP: (!) 146/66 (03/27/19 1206)  SpO2: 95 % (03/27/19 1206) Vital Signs (24h Range):  Temp:  [97.2 °F (36.2 °C)-98.7 °F (37.1 °C)] 98.2 °F (36.8 °C)  Pulse:  [63-74] 63  Resp:  [16-18] 16  SpO2:  [95 %-96 %] 95 %  BP: (117-151)/(54-71) 146/66     Weight: 126.6 kg (279 lb)  Body mass index is 43.7 kg/m².    Intake/Output Summary (Last 24 hours) at 3/27/2019 1448  Last data filed at 3/27/2019 0653  Gross per 24 hour   Intake 390 ml   Output 2100 ml   Net -1710 ml      Physical Exam   Constitutional: She appears well-developed and well-nourished.   Body mass index is 49.65 kg/m².     HENT:   Head: Normocephalic and atraumatic.   Eyes: No scleral icterus.   Neck: Normal range of motion. Neck supple.   Cardiovascular: Normal rate, regular rhythm, normal heart sounds and intact distal pulses.   No murmur heard.  Pulmonary/Chest: Effort normal and breath sounds normal. No respiratory distress. She has no wheezes.   Abdominal: Soft. Bowel sounds are normal. She exhibits no distension. There is no tenderness.   Musculoskeletal:        Right lower leg: She exhibits edema.        Left lower leg: She exhibits edema.   Venous stasis dermatitis with wounds present on lateral aspect of distal lower extremities. Foot wounds covered, wrapped with kerlix.   Neurological: She is alert.   Skin: Skin is warm and dry.   Psychiatric: She has a normal mood and affect. Her behavior is  normal.

## 2019-03-28 ENCOUNTER — TELEPHONE (OUTPATIENT)
Dept: INFECTIOUS DISEASES | Facility: CLINIC | Age: 74
End: 2019-03-28

## 2019-03-28 VITALS
HEART RATE: 57 BPM | WEIGHT: 268.94 LBS | RESPIRATION RATE: 18 BRPM | BODY MASS INDEX: 42.21 KG/M2 | TEMPERATURE: 97 F | SYSTOLIC BLOOD PRESSURE: 140 MMHG | OXYGEN SATURATION: 99 % | DIASTOLIC BLOOD PRESSURE: 63 MMHG | HEIGHT: 67 IN

## 2019-03-28 LAB
ALBUMIN SERPL BCP-MCNC: 1.6 G/DL (ref 3.5–5.2)
ALP SERPL-CCNC: 215 U/L (ref 55–135)
ALT SERPL W/O P-5'-P-CCNC: 12 U/L (ref 10–44)
ANION GAP SERPL CALC-SCNC: 8 MMOL/L (ref 8–16)
AST SERPL-CCNC: 21 U/L (ref 10–40)
BASOPHILS # BLD AUTO: 0.02 K/UL (ref 0–0.2)
BASOPHILS NFR BLD: 0.4 % (ref 0–1.9)
BILIRUB SERPL-MCNC: 0.4 MG/DL (ref 0.1–1)
BUN SERPL-MCNC: 17 MG/DL (ref 8–23)
CALCIUM SERPL-MCNC: 9.1 MG/DL (ref 8.7–10.5)
CHLORIDE SERPL-SCNC: 95 MMOL/L (ref 95–110)
CO2 SERPL-SCNC: 34 MMOL/L (ref 23–29)
CREAT SERPL-MCNC: 1 MG/DL (ref 0.5–1.4)
DIFFERENTIAL METHOD: ABNORMAL
EOSINOPHIL # BLD AUTO: 0.2 K/UL (ref 0–0.5)
EOSINOPHIL NFR BLD: 3.4 % (ref 0–8)
ERYTHROCYTE [DISTWIDTH] IN BLOOD BY AUTOMATED COUNT: 12.6 % (ref 11.5–14.5)
EST. GFR  (AFRICAN AMERICAN): >60 ML/MIN/1.73 M^2
EST. GFR  (NON AFRICAN AMERICAN): 54.9 ML/MIN/1.73 M^2
GLUCOSE SERPL-MCNC: 191 MG/DL (ref 70–110)
HCT VFR BLD AUTO: 29.6 % (ref 37–48.5)
HGB BLD-MCNC: 9.5 G/DL (ref 12–16)
IMM GRANULOCYTES # BLD AUTO: 0.04 K/UL (ref 0–0.04)
IMM GRANULOCYTES NFR BLD AUTO: 0.8 % (ref 0–0.5)
LYMPHOCYTES # BLD AUTO: 1.3 K/UL (ref 1–4.8)
LYMPHOCYTES NFR BLD: 24.8 % (ref 18–48)
MAGNESIUM SERPL-MCNC: 1.4 MG/DL (ref 1.6–2.6)
MCH RBC QN AUTO: 28.4 PG (ref 27–31)
MCHC RBC AUTO-ENTMCNC: 32.1 G/DL (ref 32–36)
MCV RBC AUTO: 89 FL (ref 82–98)
MONOCYTES # BLD AUTO: 0.5 K/UL (ref 0.3–1)
MONOCYTES NFR BLD: 10.1 % (ref 4–15)
NEUTROPHILS # BLD AUTO: 3.1 K/UL (ref 1.8–7.7)
NEUTROPHILS NFR BLD: 60.5 % (ref 38–73)
NRBC BLD-RTO: 0 /100 WBC
PHOSPHATE SERPL-MCNC: 2.6 MG/DL (ref 2.7–4.5)
PLATELET # BLD AUTO: 281 K/UL (ref 150–350)
PMV BLD AUTO: 8.9 FL (ref 9.2–12.9)
POCT GLUCOSE: 152 MG/DL (ref 70–110)
POCT GLUCOSE: 171 MG/DL (ref 70–110)
POCT GLUCOSE: 173 MG/DL (ref 70–110)
POCT GLUCOSE: 242 MG/DL (ref 70–110)
POTASSIUM SERPL-SCNC: 3.4 MMOL/L (ref 3.5–5.1)
PROT SERPL-MCNC: 6.5 G/DL (ref 6–8.4)
RBC # BLD AUTO: 3.34 M/UL (ref 4–5.4)
SODIUM SERPL-SCNC: 137 MMOL/L (ref 136–145)
VANCOMYCIN TROUGH SERPL-MCNC: 29.6 UG/ML (ref 10–22)
WBC # BLD AUTO: 5.05 K/UL (ref 3.9–12.7)

## 2019-03-28 PROCEDURE — 63600175 PHARM REV CODE 636 W HCPCS: Mod: HCNC | Performed by: STUDENT IN AN ORGANIZED HEALTH CARE EDUCATION/TRAINING PROGRAM

## 2019-03-28 PROCEDURE — 84100 ASSAY OF PHOSPHORUS: CPT | Mod: HCNC

## 2019-03-28 PROCEDURE — 63600175 PHARM REV CODE 636 W HCPCS: Mod: HCNC | Performed by: HOSPITALIST

## 2019-03-28 PROCEDURE — A4216 STERILE WATER/SALINE, 10 ML: HCPCS | Mod: HCNC | Performed by: HOSPITALIST

## 2019-03-28 PROCEDURE — 85025 COMPLETE CBC W/AUTO DIFF WBC: CPT | Mod: HCNC

## 2019-03-28 PROCEDURE — 83735 ASSAY OF MAGNESIUM: CPT | Mod: HCNC

## 2019-03-28 PROCEDURE — 99239 PR HOSPITAL DISCHARGE DAY,>30 MIN: ICD-10-PCS | Mod: HCNC,GC,, | Performed by: HOSPITALIST

## 2019-03-28 PROCEDURE — 25000003 PHARM REV CODE 250: Mod: HCNC | Performed by: STUDENT IN AN ORGANIZED HEALTH CARE EDUCATION/TRAINING PROGRAM

## 2019-03-28 PROCEDURE — 80053 COMPREHEN METABOLIC PANEL: CPT | Mod: HCNC

## 2019-03-28 PROCEDURE — 25000003 PHARM REV CODE 250: Mod: HCNC | Performed by: HOSPITALIST

## 2019-03-28 PROCEDURE — 99239 HOSP IP/OBS DSCHRG MGMT >30: CPT | Mod: HCNC,GC,, | Performed by: HOSPITALIST

## 2019-03-28 PROCEDURE — 80202 ASSAY OF VANCOMYCIN: CPT | Mod: HCNC

## 2019-03-28 RX ORDER — VANCOMYCIN HCL IN 5 % DEXTROSE 1.25 G/25
1250 PLASTIC BAG, INJECTION (ML) INTRAVENOUS DAILY
Start: 2019-03-29 | End: 2019-05-09

## 2019-03-28 RX ORDER — VANCOMYCIN HCL IN 5 % DEXTROSE 1.25 G/25
1250 PLASTIC BAG, INJECTION (ML) INTRAVENOUS
Start: 2019-03-29 | End: 2019-03-28

## 2019-03-28 RX ORDER — VANCOMYCIN HCL IN 5 % DEXTROSE 1.25 G/25
1250 PLASTIC BAG, INJECTION (ML) INTRAVENOUS
Status: DISCONTINUED | OUTPATIENT
Start: 2019-03-29 | End: 2019-03-28 | Stop reason: HOSPADM

## 2019-03-28 RX ORDER — MAGNESIUM SULFATE HEPTAHYDRATE 40 MG/ML
2 INJECTION, SOLUTION INTRAVENOUS ONCE
Status: COMPLETED | OUTPATIENT
Start: 2019-03-28 | End: 2019-03-28

## 2019-03-28 RX ORDER — POTASSIUM CHLORIDE 20 MEQ/1
40 TABLET, EXTENDED RELEASE ORAL ONCE
Status: COMPLETED | OUTPATIENT
Start: 2019-03-28 | End: 2019-03-28

## 2019-03-28 RX ADMIN — Medication 1250 MG: at 01:03

## 2019-03-28 RX ADMIN — MICONAZOLE NITRATE: 20 CREAM TOPICAL at 09:03

## 2019-03-28 RX ADMIN — HEPARIN SODIUM 5000 UNITS: 5000 INJECTION, SOLUTION INTRAVENOUS; SUBCUTANEOUS at 05:03

## 2019-03-28 RX ADMIN — Medication 10 ML: at 11:03

## 2019-03-28 RX ADMIN — Medication 1250 MG: at 12:03

## 2019-03-28 RX ADMIN — MEROPENEM AND SODIUM CHLORIDE 1 G: 1 INJECTION, SOLUTION INTRAVENOUS at 03:03

## 2019-03-28 RX ADMIN — Medication 10 ML: at 06:03

## 2019-03-28 RX ADMIN — INSULIN ASPART 4 UNITS: 100 INJECTION, SOLUTION INTRAVENOUS; SUBCUTANEOUS at 09:03

## 2019-03-28 RX ADMIN — Medication 10 ML: at 12:03

## 2019-03-28 RX ADMIN — POTASSIUM CHLORIDE 40 MEQ: 1500 TABLET, EXTENDED RELEASE ORAL at 08:03

## 2019-03-28 RX ADMIN — MAGNESIUM SULFATE IN WATER 2 G: 40 INJECTION, SOLUTION INTRAVENOUS at 08:03

## 2019-03-28 RX ADMIN — Medication 10 ML: at 05:03

## 2019-03-28 RX ADMIN — ASPIRIN 81 MG CHEWABLE TABLET 81 MG: 81 TABLET CHEWABLE at 09:03

## 2019-03-28 RX ADMIN — MEROPENEM AND SODIUM CHLORIDE 1 G: 1 INJECTION, SOLUTION INTRAVENOUS at 11:03

## 2019-03-28 RX ADMIN — INSULIN ASPART 4 UNITS: 100 INJECTION, SOLUTION INTRAVENOUS; SUBCUTANEOUS at 05:03

## 2019-03-28 RX ADMIN — CLOPIDOGREL 75 MG: 75 TABLET, FILM COATED ORAL at 09:03

## 2019-03-28 RX ADMIN — BUMETANIDE 2 MG: 1 TABLET ORAL at 09:03

## 2019-03-28 RX ADMIN — INSULIN ASPART 4 UNITS: 100 INJECTION, SOLUTION INTRAVENOUS; SUBCUTANEOUS at 01:03

## 2019-03-28 NOTE — PLAN OF CARE
Spoke with Danita RN from [facility]. Patient's Granddaughter to arrive to hospital after 4p for antibiotic teaching. [facility] plan for patient to discharge late this afternoon after family receives education. SW to place referral for [facility] to care for patient at home.

## 2019-03-28 NOTE — PLAN OF CARE
Problem: Fall Injury Risk  Goal: Absence of Fall and Fall-Related Injury  Outcome: Ongoing (interventions implemented as appropriate)  Fall precautions explained to pt. Pt verbalized  understanding of precautions and safety instructions. Bed in low, locked position, call light within reach, bed alarm active and audible, instructed to call nursing staff for assistance with ambulation. Personal items within a safe distance for reach. Pt remains free from falls this shift.         Comments: Plan of care discussed with patient. Questions pertaining to plan this shift answered. Pt verbally acknowledged understanding of plan, denies any further question at this time.

## 2019-03-28 NOTE — NURSING
Hold rest of Vancomycin dosage until [facility] calls back, they stated they will discuss with pharmacist.

## 2019-03-28 NOTE — TELEPHONE ENCOUNTER
Aubrey with [facility] called. Pt is going home with vancomycin 1.25g Q24 today.   -hold dose and do a STAT vancomycin level tomorrow. Before continuing the iv abx  Vancomycin trough today-  29.6

## 2019-03-28 NOTE — DISCHARGE SUMMARY
[facility] Hospital Medicine  Discharge Summary      Patient Name: Sherin Ortiz  MRN: [id number]  Admission Date: 3/22/2019  Hospital Length of Stay: 6 days  Discharge Date and Time:  03/28/2019 5:12 PM  Attending Physician: Valorie Saenz MD   Discharging Provider: Julia Bhandari MD  Primary Care Provider: Ame Vasquez MD  [facility] Medicine Team: [facility]OS[facility] MED 5 Julia Bhandari MD    HPI:   Ms Ortiz is a 75 yo F w PMHx significant for nonischemic cardiomyopathy resulting in HFpEF, chronic venous stasis resulting in dermatitis and chronic wounds affecting lower extremities, uncontrolled diabetes, HTN, h/o TIA, neuropathy and CKD 3 (baseline Cr of 1.4-1.6)    Pt presents for evaluation of left heel pain of two day duration. Pain had 10/10 intensity and came on abruptly while seated in her wheelchair. Pain felt like deep pressure on the left heel underneath one of her chronic wounds. Pain is exacerbated by transfers from her wheelchair. She cannot think of what may have caused this pain as she denies trauma to the area and even takes care to transfer on inverted feet so as not to apply direct pressure on the wound. She reports clear drainage from the wound, but states this has been an ongoing issue and is not new. She visits with wound care Nurse Lyle to receive treatment of multiple wounds and ulcers on bilateral lower extremities. She states she is adherent to the regiment prescribed and has been receiving home visits from skilled nursing three times per week. Despite this, she unfortunately has developed a new ulcer on her right leg.     She denies fever, purulent drainage or recent weight loss. Review of systems was positive for nausea with one episode of self-induced vomiting which resolved. Other than transfers, pt does not ambulate.      * No surgery found *      Hospital Course:   Admitted 03/22 with heel pain x2 days consistent with cellulitis. Initial concern for  osteomyelitis (ESR, CRP elevated). Given vanc and meropenem in ED (PCN allergy, hx resistant organisms in wounds). MRI inconclusive regarding presence / absence of osteomyelitis. Podiatry and ID consulted; Podiatry feels infection consistent with cellulitis and recommends 2-3 week course antibiotics which ID agrees with. Pt does not any surgical intervention at this time. Discharged home on Vanc and Meropenum for total of 3 weeks (end date 04/12/19). F/u in ID clinic       At day of discharge, Pt. Was seen and examine   Respiratory: denies cough, dyspnea on exertion and pleurisy  Cardiovascular: denies chest pain, chest pressure/discomfort, dyspnea, exertional chest   Gastrointestinal: denies nausea or vomiting, abdominal pain or change in bowel habits  Genitourinary: denies hematuria or dysuria  Musculoskeletal: BLE swelling and erythema   Neurological: denies seizures or tremors     PE:  CV: Normal S1, S2  Resp: Lungs CTA Bilaterally, Unlabored  Abdomen: NTND, BS normoactive x4 quads, soft  Extrem: + BLE edema.  Skin: + BLE cellulitis   Neuro: motor strength in tact. No focal deficit   PSYCH: Oriented x3      Consults:   Consults (From admission, onward)        Status Ordering Provider     Inpatient consult to Infectious Diseases  Once     Provider:  (Not yet assigned)    Completed ANGEL COX     [facility]  Once     Provider:  (Not yet assigned)    Completed HENOK CHATTERJEE     Inpatient consult to Podiatry  Once     Provider:  (Not yet assigned)    Completed KHAN, BEHRAM            Final Active Diagnoses:    Diagnosis Date Noted POA    PRINCIPAL PROBLEM:  Cellulitis [L03.90] 03/22/2019 Yes    Goals of care, counseling/discussion [Z71.89] 03/23/2019 Not Applicable    Acute renal failure superimposed on stage 3 chronic kidney disease [N17.9, N18.3] 03/22/2019 Yes    Leukocytosis [D72.829] 03/22/2019 Yes    Hyperglycemia [R73.9] 03/22/2019 Yes    Prolonged  Q-T interval on ECG [R94.31] 03/22/2019 Yes    Hyperbilirubinemia [E80.6] 03/22/2019 Yes    Ulcer of left heel and midfoot, limited to breakdown of skin [L97.421] 02/13/2019 Yes    (HFpEF) heart failure with preserved ejection fraction [I50.30] 01/22/2018 Yes    Hx of transient ischemic attack (TIA) [Z86.73] 01/22/2018 Not Applicable    Uncontrolled type 2 diabetes mellitus with stage 3 chronic kidney disease, with long-term current use of insulin [E11.22, E11.65, N18.3, Z79.4] 11/08/2016 Not Applicable     Chronic    Dermatitis, stasis [I87.2] 10/19/2016 Yes     Chronic    Venous insufficiency of leg [I87.2] 04/12/2016 Yes     Chronic    Wheelchair dependent [Z99.3] 11/29/2015 Not Applicable     Chronic    Anemia of chronic disease [D63.8] 09/18/2015 Yes    Diabetic peripheral neuropathy associated with type 2 diabetes mellitus [E11.42] 09/18/2015 Yes     Chronic    Hypoalbuminemia [E88.09] 09/18/2015 Yes    Morbid obesity with BMI of 40.0-44.9, adult [E66.01, Z68.41] 02/18/2015 Not Applicable    Weakness [R53.1] 11/17/2014 Yes    Hyponatremia [E87.1] 01/17/2014 Yes    Essential hypertension [I10] 11/14/2012 Yes     Chronic    Hyperlipidemia LDL goal <100 [E78.5] 08/14/2012 Yes     Chronic      Problems Resolved During this Admission:       Discharged Condition: good    Disposition: Home or Self Care    Follow Up:  Follow-up Information     Ame Vasquez MD.    Specialty:  Internal Medicine  Why:  Appt scheduled 4/18/19 @10AM  Contact information:  [address]  [facility] [address]  [phone]                 Patient Instructions:      Ambulatory Referral to Wound Clinic   Referral Priority: Routine Referral Type: Consultation   Referral Reason: Specialty Services Required   Requested Specialty: Wound Care   Number of Visits Requested: 1     Ambulatory Referral to Infectious Disease   Referral Priority: Routine Referral Type: Consultation   Referral Reason: Specialty Services Required    Requested Specialty: Infectious Diseases   Number of Visits Requested: 1     Ambulatory Referral to Podiatry   Referral Priority: Routine Referral Type: Consultation   Referral Reason: Specialty Services Required   Requested Specialty: Podiatry   Number of Visits Requested: 1     Diet diabetic     Diet Cardiac       Significant Diagnostic Studies: Labs:   CMP   Recent Labs   Lab 03/27/19 0433 03/28/19 0447   * 137   K 3.7 3.4*   CL 96 95   CO2 32* 34*   * 191*   BUN 18 17   CREATININE 1.0 1.0   CALCIUM 8.8 9.1   PROT 6.7 6.5   ALBUMIN 1.7* 1.6*   BILITOT 0.5 0.4   ALKPHOS 225* 215*   AST 17 21   ALT 10 12   ANIONGAP 7* 8   ESTGFRAFRICA >60.0 >60.0   EGFRNONAA 54.9* 54.9*    and CBC   Recent Labs   Lab 03/27/19 0433 03/28/19 0447   WBC 6.48 5.05   HGB 9.5* 9.5*   HCT 30.2* 29.6*    281     Microbiology:   Procedure Component Value Units Date/Time   Culture, Anaerobe [[id number]] Collected: 03/23/19 1119   Order Status: Completed Specimen: Wound from Foot, Left Updated: 03/27/19 0956    Anaerobic Culture No anaerobes isolated   Narrative:     Heel wound   Aerobic culture [[id number]]  Collected: 03/23/19 1119   Order Status: Completed Specimen: Wound from Foot, Left Updated: 03/26/19 1110    Aerobic Bacterial Culture --    ENTEROBACTER AEROGENES   Few     Aerobic Bacterial Culture --    METHICILLIN RESISTANT STAPHYLOCOCCUS AUREUS   Many     Aerobic Bacterial Culture --    ENTEROBACTER CLOACAE   Few   Skin lyndsey also present    Narrative:     Heel wound   Susceptibility      Enterobacter aerogenes Methicillin resistant staphylococcus aureus Enterobacter cloacae     CULTURE, AEROBIC  (SPECIFY SOURCE) CULTURE, AEROBIC  (SPECIFY SOURCE) CULTURE, AEROBIC  (SPECIFY SOURCE)     Cefepime <=8  Sensitive   <=8  Sensitive     Ceftriaxone <=8  Sensitive   <=8  Sensitive     Ciprofloxacin <=1  Sensitive   <=1  Sensitive     Clindamycin   >4  Resistant       Ertapenem <=2  Sensitive   <=2  Sensitive      Erythromycin   >4  Resistant       Gentamicin <=4  Sensitive   <=4  Sensitive     Meropenem <=4  Sensitive   <=4  Sensitive     Oxacillin   >2  Resistant       Penicillin   2  Resistant       Piperacillin/Tazo <=16  Sensitive   <=16  Sensitive     Tetracycline <=4  Sensitive >8  Resistant <=4  Sensitive     Tobramycin <=4  Sensitive   <=4  Sensitive     Trimeth/Sulfa <=2/38  Sensitive <=0.5/9.5  Sensitive <=2/38  Sensitive     Vancomycin   2  Sensitive                  Linear View         Aerobic culture [[id number]]  Collected: 03/22/19 2200   Order Status: Completed Specimen: Wound from Toe, Left Foot Updated: 03/25/19 1004    Aerobic Bacterial Culture --    PSEUDOMONAS AERUGINOSA   Many     Aerobic Bacterial Culture --    PROVIDENCIA STUARTII   Many   Skin lyndsey also present    Susceptibility      Pseudomonas aeruginosa Providencia stuartii     CULTURE, AEROBIC  (SPECIFY SOURCE) CULTURE, AEROBIC  (SPECIFY SOURCE)     Amikacin <=16  Sensitive <=16  Sensitive     Amp/Sulbactam   >16/8  Resistant     Cefepime <=8  Sensitive <=8  Sensitive     Ceftriaxone   32  Intermediate     Ciprofloxacin >2  Resistant >2  Resistant     Ertapenem   <=2  Sensitive     Gentamicin 8  Intermediate 8  Resistant     Meropenem <=4  Sensitive <=4  Sensitive     Piperacillin/Tazo <=16  Sensitive <=16  Sensitive     Tobramycin <=4  Sensitive 8  Resistant     Trimeth/Sulfa   <=2/38  Sensitive              Linear View         Aerobic culture [[id number]]  Collected: 03/22/19 2200   Order Status: Completed Specimen: Wound from Foot, Left Updated: 03/26/19 1112    Aerobic Bacterial Culture --    KLEBSIELLA OXYTOCA   Moderate     Aerobic Bacterial Culture --    MORGANELLA MORGANII   Moderate     Aerobic Bacterial Culture --    METHICILLIN RESISTANT STAPHYLOCOCCUS AUREUS   Many     Aerobic Bacterial Culture --    PROTEUS MIRABILIS   Few    Narrative:     Left heel   Susceptibility      Klebsiella oxytoca Morganella morganii Methicillin resistant  staphylococcus aureus     CULTURE, AEROBIC  (SPECIFY SOURCE) CULTURE, AEROBIC  (SPECIFY SOURCE) CULTURE, AEROBIC  (SPECIFY SOURCE)     Amox/K Clav'ate <=8/4  Sensitive         Amp/Sulbactam 16/8  Intermediate 16/8  Intermediate       Ampicillin >16  Resistant         Cefazolin >16  Resistant         Cefepime <=8  Sensitive <=8  Sensitive       Ceftriaxone <=8  Sensitive <=8  Sensitive       Ciprofloxacin <=1  Sensitive <=1  Sensitive       Clindamycin     >4  Resistant     Ertapenem <=2  Sensitive <=2  Sensitive       Erythromycin     4  Intermediate     Gentamicin <=4  Sensitive <=4  Sensitive       Meropenem <=4  Sensitive <=4  Sensitive       Oxacillin     >2  Resistant     Penicillin     2  Resistant     Piperacillin/Tazo <=16  Sensitive <=16  Sensitive       Tetracycline <=4  Sensitive   >8  Resistant     Tobramycin <=4  Sensitive <=4  Sensitive       Trimeth/Sulfa <=2/38  Sensitive <=2/38  Sensitive <=0.5/9.5  Sensitive     Vancomycin     2  Sensitive                Linear View      Susceptibility      Proteus mirabilis     CULTURE, AEROBIC  (SPECIFY SOURCE)     Amox/K Clav'ate <=8/4  Sensitive     Amp/Sulbactam <=8/4  Sensitive     Ampicillin <=8  Sensitive     Cefazolin <=8  Sensitive     Cefepime <=8  Sensitive     Ceftriaxone <=8  Sensitive     Ciprofloxacin >2  Resistant     Ertapenem <=2  Sensitive     Gentamicin <=4  Sensitive     Meropenem <=4  Sensitive     Piperacillin/Tazo <=16  Sensitive     Tobramycin <=4  Sensitive     Trimeth/Sulfa <=2/38  Sensitive            Linear View         Aerobic culture [[id number]]  Collected: 03/22/19 2200   Order Status: Completed Specimen: Wound from Foot, Left Updated: 03/25/19 0950    Aerobic Bacterial Culture --    PSEUDOMONAS AERUGINOSA   Many     Aerobic Bacterial Culture --    KLEBSIELLA OXYTOCA   Moderate    Narrative:     Left lower calf   Susceptibility      Pseudomonas aeruginosa Klebsiella oxytoca     CULTURE, AEROBIC  (SPECIFY SOURCE) CULTURE, AEROBIC   (SPECIFY SOURCE)     Amikacin <=16  Sensitive       Amox/K Clav'ate   <=8/4  Sensitive     Amp/Sulbactam   <=8/4  Sensitive     Ampicillin   >16  Resistant     Cefazolin   >16  Resistant     Cefepime <=8  Sensitive <=8  Sensitive     Ceftriaxone   <=8  Sensitive     Ciprofloxacin >2  Resistant <=1  Sensitive     Ertapenem   <=2  Sensitive     Gentamicin <=4  Sensitive <=4  Sensitive     Meropenem <=4  Sensitive <=4  Sensitive     Piperacillin/Tazo <=16  Sensitive <=16  Sensitive     Tetracycline   <=4  Sensitive     Tobramycin <=4  Sensitive <=4  Sensitive     Trimeth/Sulfa   <=2/38  Sensitive              Radiology: MRI: 3/23/19  Destructive/ neuropathic hindfoot joint changes with nonunion calcaneal fracture and extensive reactive marrow edema.  Superimposed osteomyelitis may be present.    Pending Diagnostic Studies:     None         Medications:  Reconciled Home Medications:      Medication List      START taking these medications    meropenem-0.9% sodium chloride 1 gram/50 mL Pgbk  Inject 50 mLs (1 g total) into the vein every 8 (eight) hours. END date 4/12/19     vancomycin in 5 % dextrose 1.25 gram/250 mL Soln  Inject 250 mLs (1,250 mg total) into the vein once daily.  END date 4/12/19        CHANGE how you take these medications    diclofenac sodium 1 % Gel  Commonly known as:  VOLTAREN  Apply 2 g topically nightly as needed.  What changed:  reasons to take this     LANTUS U-100 INSULIN 100 unit/mL injection  Generic drug:  insulin glargine  INJECT 7 TO 10 UNITS SUBCUTANEOUSLY IN THE EVENING DEPENDING ON SCALE (DISCARD EACH VIAL AFTER 28 DAYS)  What changed:  See the new instructions.        CONTINUE taking these medications    ACCU-CHEK RAMIRO PLUS TEST STRP Strp  Generic drug:  blood sugar diagnostic  TEST TWICE DAILY     ACCU-CHEK SOFTCLIX LANCETS Misc  Generic drug:  lancets  Test twice daily     aspirin 81 MG Chew  Take 81 mg by mouth once daily.     atorvastatin 40 MG tablet  Commonly known as:   "LIPITOR  TAKE 1 TABLET EVERY EVENING     BD INSULIN SYRINGE ULTRA-FINE 0.5 mL 30 gauge x 1/2" Syrg  Generic drug:  insulin syringe-needle U-100  USE EVERY NIGHT     bumetanide 1 MG tablet  Commonly known as:  BUMEX  Take 2 tablets (2 mg total) by mouth once daily.     clopidogrel 75 mg tablet  Commonly known as:  PLAVIX  TAKE 1 TABLET EVERY DAY        STOP taking these medications    glimepiride 1 MG tablet  Commonly known as:  AMARYL     ibuprofen 200 MG tablet  Commonly known as:  ADVIL,MOTRIN            Indwelling Lines/Drains at time of discharge:   Lines/Drains/Airways     Peripherally Inserted Central Catheter Line                 PICC Double Lumen 03/27/19 1145 right brachial 1 day          Drain            Female External Urinary Catheter 03/26/19 1723 1 day          Pressure Ulcer                 Pressure Ulcer Left heel Stage III -- days         Pressure Ulcer Right heel Stage III -- days         Pressure Injury 01/09/19 1513 Right medial Heel #4 Stage 3 78 days                Time spent on the discharge of patient: >60 minutes  Patient was seen and examined on the date of discharge and determined to be suitable for discharge.         Julia Bhandari MD  Department of Hospital Medicine  [facility]  "

## 2019-03-29 ENCOUNTER — LAB VISIT (OUTPATIENT)
Dept: LAB | Facility: HOSPITAL | Age: 74
End: 2019-03-29
Attending: INTERNAL MEDICINE
Payer: MEDICARE

## 2019-03-29 ENCOUNTER — TELEPHONE (OUTPATIENT)
Dept: WOUND CARE | Facility: CLINIC | Age: 74
End: 2019-03-29

## 2019-03-29 DIAGNOSIS — D72.829 LEUCOCYTOSIS: Primary | ICD-10-CM

## 2019-03-29 LAB
ANION GAP SERPL CALC-SCNC: 12 MMOL/L (ref 8–16)
BUN SERPL-MCNC: 18 MG/DL (ref 8–23)
CALCIUM SERPL-MCNC: 9.8 MG/DL (ref 8.7–10.5)
CHLORIDE SERPL-SCNC: 93 MMOL/L (ref 95–110)
CO2 SERPL-SCNC: 31 MMOL/L (ref 23–29)
CREAT SERPL-MCNC: 1.3 MG/DL (ref 0.5–1.4)
EST. GFR  (AFRICAN AMERICAN): 46 ML/MIN/1.73 M^2
EST. GFR  (NON AFRICAN AMERICAN): 39.9 ML/MIN/1.73 M^2
GLUCOSE SERPL-MCNC: 278 MG/DL (ref 70–110)
POTASSIUM SERPL-SCNC: 4.6 MMOL/L (ref 3.5–5.1)
SODIUM SERPL-SCNC: 136 MMOL/L (ref 136–145)
VANCOMYCIN SERPL-MCNC: 30.8 UG/ML

## 2019-03-29 PROCEDURE — 80048 BASIC METABOLIC PNL TOTAL CA: CPT | Mod: HCNC

## 2019-03-29 PROCEDURE — 80202 ASSAY OF VANCOMYCIN: CPT | Mod: HCNC

## 2019-03-29 NOTE — TELEPHONE ENCOUNTER
----- Message from Harsha Edu sent at 3/29/2019  1:04 PM CDT -----  Contact: Abner/[facility]  Reason for call:Caller states pt is out of the hospital, Caller states pt need a appt soon.        Communication Preference:[phone]

## 2019-03-29 NOTE — NURSING
Home Health Nurse educated granddaughter on how to administer IV antibiotics at home. Discharge instructions explained to granddaughter and patient. PICC line left in place.  Pt has just left the floor with transport on wheelchair and granddaughter with belongings.  Room was just cleared out.

## 2019-03-29 NOTE — PLAN OF CARE
Patient discharged to Home on 3/28/19. HH care will be provided by [facility]. IV antibiotics will be provided by [facility].     03/29/19 0841   Final Note   Assessment Type Final Discharge Note   Anticipated Discharge Disposition Home-Health   What phone number can be called within the next 1-3 days to see how you are doing after discharge?   ([phone])   Hospital Follow Up  Appt(s) scheduled? Yes   Discharge plans and expectations educations in teach back method with documentation complete? Yes   Right Care Referral Info   Post Acute Recommendation Home-care   Referral Type Home Health/ Home Infusion   Facility Name [facility]/ [facility]

## 2019-04-01 ENCOUNTER — LAB VISIT (OUTPATIENT)
Dept: LAB | Facility: HOSPITAL | Age: 74
End: 2019-04-01
Attending: INTERNAL MEDICINE
Payer: MEDICARE

## 2019-04-01 ENCOUNTER — PATIENT OUTREACH (OUTPATIENT)
Dept: ADMINISTRATIVE | Facility: CLINIC | Age: 74
End: 2019-04-01

## 2019-04-01 DIAGNOSIS — L03.818 CELLULITIS OF OTHER SPECIFIED SITE: Primary | ICD-10-CM

## 2019-04-01 DIAGNOSIS — L03.90 CELLULITIS OF SKIN WITH LYMPHANGITIS: Primary | ICD-10-CM

## 2019-04-01 LAB
ALBUMIN SERPL BCP-MCNC: 2 G/DL (ref 3.5–5.2)
ALP SERPL-CCNC: 193 U/L (ref 55–135)
ALT SERPL W/O P-5'-P-CCNC: 13 U/L (ref 10–44)
ANION GAP SERPL CALC-SCNC: 12 MMOL/L (ref 8–16)
AST SERPL-CCNC: 31 U/L (ref 10–40)
BILIRUB SERPL-MCNC: 0.6 MG/DL (ref 0.1–1)
BUN SERPL-MCNC: 19 MG/DL (ref 8–23)
CALCIUM SERPL-MCNC: 9.4 MG/DL (ref 8.7–10.5)
CHLORIDE SERPL-SCNC: 94 MMOL/L (ref 95–110)
CO2 SERPL-SCNC: 31 MMOL/L (ref 23–29)
CREAT SERPL-MCNC: 1.5 MG/DL (ref 0.5–1.4)
ERYTHROCYTE [DISTWIDTH] IN BLOOD BY AUTOMATED COUNT: 13 % (ref 11.5–14.5)
EST. GFR  (AFRICAN AMERICAN): 38.7 ML/MIN/1.73 M^2
EST. GFR  (NON AFRICAN AMERICAN): 33.6 ML/MIN/1.73 M^2
GLUCOSE SERPL-MCNC: 255 MG/DL (ref 70–110)
HCT VFR BLD AUTO: 36 % (ref 37–48.5)
HGB BLD-MCNC: 11.5 G/DL (ref 12–16)
MCH RBC QN AUTO: 28.7 PG (ref 27–31)
MCHC RBC AUTO-ENTMCNC: 31.9 G/DL (ref 32–36)
MCV RBC AUTO: 90 FL (ref 82–98)
PLATELET # BLD AUTO: 410 K/UL (ref 150–350)
PMV BLD AUTO: 9 FL (ref 9.2–12.9)
POTASSIUM SERPL-SCNC: 3.9 MMOL/L (ref 3.5–5.1)
PROT SERPL-MCNC: 8 G/DL (ref 6–8.4)
RBC # BLD AUTO: 4.01 M/UL (ref 4–5.4)
SODIUM SERPL-SCNC: 137 MMOL/L (ref 136–145)
VANCOMYCIN SERPL-MCNC: 15 UG/ML
WBC # BLD AUTO: 5.42 K/UL (ref 3.9–12.7)

## 2019-04-01 PROCEDURE — 80202 ASSAY OF VANCOMYCIN: CPT | Mod: HCNC

## 2019-04-01 PROCEDURE — 80053 COMPREHEN METABOLIC PANEL: CPT | Mod: HCNC

## 2019-04-01 PROCEDURE — 85027 COMPLETE CBC AUTOMATED: CPT | Mod: HCNC

## 2019-04-01 NOTE — PATIENT INSTRUCTIONS
Discharge Instructions for Cellulitis  You have been diagnosed with cellulitis. This is an infection in the deepest layer of the skin. In some cases, the infection also affects the muscle. Cellulitis is caused by bacteria. The bacteria can enter the body through broken skin. This can happen with a cut, scratch, animal bite, or an insect bite that has been scratched. You may have been treated in the hospital with antibiotics and fluids. You will likely be given a prescription for antibiotics to take at home. This sheet will help you take care of yourself at home.  Home care  When you are home:  · Take the prescribed antibiotic medicine you are given as directed until it is gone. Take it even if you feel better. It treats the infection and stops it from returning. Not taking all the medicine can make future infections hard to treat.  · Keep the infected area clean.  · When possible, raise the infected area above the level of your heart. This helps keep swelling down.  · Talk with your healthcare provider if you are in pain. Ask what kind of over-the-counter medicine you can take for pain.  · Apply clean bandages as advised.  · Take your temperature once a day for a week.  · Wash your hands often to prevent spreading the infection.  In the future, wash your hands before and after you touch cuts, scratches, or bandages. This will help prevent infection.   When to call your healthcare provider  Call your healthcare provider immediately if you have any of the following:  · Difficulty or pain when moving the joints above or below the infected area  · Discharge or pus draining from the area  · Fever of 100.4°F (38°C) or higher, or as directed by your healthcare provider  · Pain that gets worse in or around the infected   · Redness that gets worse in or around the infected area, particularly if the area of redness expands to a wider area  · Shaking chills  · Swelling of the infected area  · Vomiting   Date Last Reviewed:  8/1/2016  © 3506-7422 [employer], [employer]. [address] [place] [address], [place] [address]. All rights reserved. This information is not intended as a substitute for professional medical care. Always follow your healthcare professional's instructions.

## 2019-04-03 ENCOUNTER — LAB VISIT (OUTPATIENT)
Dept: LAB | Facility: HOSPITAL | Age: 74
End: 2019-04-03
Attending: INTERNAL MEDICINE
Payer: MEDICARE

## 2019-04-03 DIAGNOSIS — L03.90 CELLULITIS OF SKIN WITH LYMPHANGITIS: Primary | ICD-10-CM

## 2019-04-03 LAB
ANION GAP SERPL CALC-SCNC: 10 MMOL/L (ref 8–16)
BUN SERPL-MCNC: 21 MG/DL (ref 8–23)
CALCIUM SERPL-MCNC: 9.1 MG/DL (ref 8.7–10.5)
CHLORIDE SERPL-SCNC: 95 MMOL/L (ref 95–110)
CO2 SERPL-SCNC: 33 MMOL/L (ref 23–29)
CREAT SERPL-MCNC: 1.4 MG/DL (ref 0.5–1.4)
EST. GFR  (AFRICAN AMERICAN): 42.1 ML/MIN/1.73 M^2
EST. GFR  (NON AFRICAN AMERICAN): 36.5 ML/MIN/1.73 M^2
GLUCOSE SERPL-MCNC: 217 MG/DL (ref 70–110)
POTASSIUM SERPL-SCNC: 4.1 MMOL/L (ref 3.5–5.1)
SODIUM SERPL-SCNC: 138 MMOL/L (ref 136–145)
VANCOMYCIN TROUGH SERPL-MCNC: 18.5 UG/ML (ref 10–22)

## 2019-04-03 PROCEDURE — 80202 ASSAY OF VANCOMYCIN: CPT | Mod: HCNC

## 2019-04-03 PROCEDURE — 80048 BASIC METABOLIC PNL TOTAL CA: CPT | Mod: HCNC

## 2019-04-08 ENCOUNTER — LAB VISIT (OUTPATIENT)
Dept: LAB | Facility: HOSPITAL | Age: 74
End: 2019-04-08
Attending: INTERNAL MEDICINE
Payer: MEDICARE

## 2019-04-08 DIAGNOSIS — L03.90 CELLULITIS OF SKIN WITH LYMPHANGITIS: Primary | ICD-10-CM

## 2019-04-08 LAB
CRP SERPL-MCNC: 39.4 MG/L (ref 0–8.2)
ERYTHROCYTE [DISTWIDTH] IN BLOOD BY AUTOMATED COUNT: 13.3 % (ref 11.5–14.5)
ERYTHROCYTE [SEDIMENTATION RATE] IN BLOOD BY WESTERGREN METHOD: 88 MM/HR (ref 0–36)
HCT VFR BLD AUTO: 33.3 % (ref 37–48.5)
HGB BLD-MCNC: 10.1 G/DL (ref 12–16)
MCH RBC QN AUTO: 28.4 PG (ref 27–31)
MCHC RBC AUTO-ENTMCNC: 30.3 G/DL (ref 32–36)
MCV RBC AUTO: 94 FL (ref 82–98)
PLATELET # BLD AUTO: 397 K/UL (ref 150–350)
PMV BLD AUTO: 9.4 FL (ref 9.2–12.9)
RBC # BLD AUTO: 3.56 M/UL (ref 4–5.4)
VANCOMYCIN TROUGH SERPL-MCNC: 29.4 UG/ML (ref 10–22)
WBC # BLD AUTO: 5.38 K/UL (ref 3.9–12.7)

## 2019-04-08 PROCEDURE — 80053 COMPREHEN METABOLIC PANEL: CPT | Mod: HCNC

## 2019-04-08 PROCEDURE — 80202 ASSAY OF VANCOMYCIN: CPT | Mod: HCNC

## 2019-04-08 PROCEDURE — 85027 COMPLETE CBC AUTOMATED: CPT | Mod: HCNC

## 2019-04-08 PROCEDURE — 86140 C-REACTIVE PROTEIN: CPT | Mod: HCNC

## 2019-04-08 PROCEDURE — 85652 RBC SED RATE AUTOMATED: CPT | Mod: HCNC

## 2019-04-09 ENCOUNTER — TELEPHONE (OUTPATIENT)
Dept: INFECTIOUS DISEASES | Facility: CLINIC | Age: 74
End: 2019-04-09

## 2019-04-09 LAB
ALBUMIN SERPL BCP-MCNC: 2.1 G/DL (ref 3.5–5.2)
ALP SERPL-CCNC: 153 U/L (ref 55–135)
ALT SERPL W/O P-5'-P-CCNC: 11 U/L (ref 10–44)
ANION GAP SERPL CALC-SCNC: 12 MMOL/L (ref 8–16)
AST SERPL-CCNC: 22 U/L (ref 10–40)
BILIRUB SERPL-MCNC: 0.5 MG/DL (ref 0.1–1)
BUN SERPL-MCNC: 19 MG/DL (ref 8–23)
CALCIUM SERPL-MCNC: 8.7 MG/DL (ref 8.7–10.5)
CHLORIDE SERPL-SCNC: 100 MMOL/L (ref 95–110)
CO2 SERPL-SCNC: 27 MMOL/L (ref 23–29)
CREAT SERPL-MCNC: 1.3 MG/DL (ref 0.5–1.4)
EST. GFR  (AFRICAN AMERICAN): 46 ML/MIN/1.73 M^2
EST. GFR  (NON AFRICAN AMERICAN): 39.9 ML/MIN/1.73 M^2
GLUCOSE SERPL-MCNC: 232 MG/DL (ref 70–110)
POTASSIUM SERPL-SCNC: 4.2 MMOL/L (ref 3.5–5.1)
PROT SERPL-MCNC: 7.6 G/DL (ref 6–8.4)
SODIUM SERPL-SCNC: 139 MMOL/L (ref 136–145)

## 2019-04-09 NOTE — TELEPHONE ENCOUNTER
LABS    03/28/19  WBC-5.05  Hemoglobin-9.5  Platelets- 281  Creatinine, serum- 1.0    03/29/19  Vancomycin random- 30.8    04/01/19  Vancomycin random- 15  WBC- 5.42  Hemoglobin- 11.5  Platelets- 410  Creatinine- 1.5

## 2019-04-10 ENCOUNTER — LAB VISIT (OUTPATIENT)
Dept: LAB | Facility: HOSPITAL | Age: 74
End: 2019-04-10
Attending: INTERNAL MEDICINE
Payer: MEDICARE

## 2019-04-10 DIAGNOSIS — L03.90 CELLULITIS OF SKIN WITH LYMPHANGITIS: Primary | ICD-10-CM

## 2019-04-10 PROCEDURE — 80202 ASSAY OF VANCOMYCIN: CPT | Mod: HCNC

## 2019-04-10 PROCEDURE — 80048 BASIC METABOLIC PNL TOTAL CA: CPT | Mod: HCNC

## 2019-04-11 LAB
ANION GAP SERPL CALC-SCNC: 10 MMOL/L (ref 8–16)
BUN SERPL-MCNC: 17 MG/DL (ref 8–23)
CALCIUM SERPL-MCNC: 8.5 MG/DL (ref 8.7–10.5)
CHLORIDE SERPL-SCNC: 103 MMOL/L (ref 95–110)
CO2 SERPL-SCNC: 27 MMOL/L (ref 23–29)
CREAT SERPL-MCNC: 1.1 MG/DL (ref 0.5–1.4)
EST. GFR  (AFRICAN AMERICAN): 56.4 ML/MIN/1.73 M^2
EST. GFR  (NON AFRICAN AMERICAN): 48.9 ML/MIN/1.73 M^2
GLUCOSE SERPL-MCNC: 125 MG/DL (ref 70–110)
POTASSIUM SERPL-SCNC: 4.1 MMOL/L (ref 3.5–5.1)
SODIUM SERPL-SCNC: 140 MMOL/L (ref 136–145)
VANCOMYCIN TROUGH SERPL-MCNC: 20.9 UG/ML (ref 10–22)

## 2019-04-12 ENCOUNTER — INFUSION (OUTPATIENT)
Dept: INFECTIOUS DISEASES | Facility: HOSPITAL | Age: 74
End: 2019-04-12
Attending: INTERNAL MEDICINE
Payer: MEDICARE

## 2019-04-12 ENCOUNTER — OFFICE VISIT (OUTPATIENT)
Dept: INFECTIOUS DISEASES | Facility: CLINIC | Age: 74
End: 2019-04-12
Payer: MEDICARE

## 2019-04-12 ENCOUNTER — OFFICE VISIT (OUTPATIENT)
Dept: WOUND CARE | Facility: CLINIC | Age: 74
End: 2019-04-12
Payer: MEDICARE

## 2019-04-12 VITALS
HEART RATE: 81 BPM | TEMPERATURE: 99 F | WEIGHT: 270 LBS | BODY MASS INDEX: 42.38 KG/M2 | HEIGHT: 67 IN | DIASTOLIC BLOOD PRESSURE: 72 MMHG | SYSTOLIC BLOOD PRESSURE: 163 MMHG

## 2019-04-12 DIAGNOSIS — L03.90 CELLULITIS, UNSPECIFIED CELLULITIS SITE: ICD-10-CM

## 2019-04-12 DIAGNOSIS — N18.30 STAGE 3 CHRONIC KIDNEY DISEASE: Chronic | ICD-10-CM

## 2019-04-12 DIAGNOSIS — E11.42 DIABETIC PERIPHERAL NEUROPATHY ASSOCIATED WITH TYPE 2 DIABETES MELLITUS: Chronic | ICD-10-CM

## 2019-04-12 DIAGNOSIS — Z79.2 RECEIVING INTRAVENOUS ANTIBIOTIC TREATMENT AT HOME: ICD-10-CM

## 2019-04-12 DIAGNOSIS — L97.421 ULCER OF LEFT HEEL AND MIDFOOT, LIMITED TO BREAKDOWN OF SKIN: Primary | ICD-10-CM

## 2019-04-12 DIAGNOSIS — L97.521 ULCER OF TOE OF LEFT FOOT, LIMITED TO BREAKDOWN OF SKIN: ICD-10-CM

## 2019-04-12 DIAGNOSIS — B35.3 TINEA PEDIS OF BOTH FEET: Chronic | ICD-10-CM

## 2019-04-12 DIAGNOSIS — L97.911 ULCER OF RIGHT LOWER EXTREMITY, LIMITED TO BREAKDOWN OF SKIN: ICD-10-CM

## 2019-04-12 DIAGNOSIS — L97.512 ULCER OF TOE, RIGHT, WITH FAT LAYER EXPOSED: ICD-10-CM

## 2019-04-12 DIAGNOSIS — L97.922 ULCER OF LOWER LIMB, LEFT, WITH FAT LAYER EXPOSED: ICD-10-CM

## 2019-04-12 DIAGNOSIS — I87.2 VENOUS STASIS DERMATITIS OF BOTH LOWER EXTREMITIES: Chronic | ICD-10-CM

## 2019-04-12 DIAGNOSIS — R60.0 BILATERAL LEG EDEMA: ICD-10-CM

## 2019-04-12 DIAGNOSIS — L97.412 ULCER OF RIGHT HEEL AND MIDFOOT WITH FAT LAYER EXPOSED: ICD-10-CM

## 2019-04-12 DIAGNOSIS — I87.2 VENOUS INSUFFICIENCY OF BOTH LOWER EXTREMITIES: Chronic | ICD-10-CM

## 2019-04-12 DIAGNOSIS — L97.512 SKIN ULCER OF TOE OF RIGHT FOOT WITH FAT LAYER EXPOSED: Primary | ICD-10-CM

## 2019-04-12 DIAGNOSIS — L97.421 ULCER OF LEFT HEEL AND MIDFOOT, LIMITED TO BREAKDOWN OF SKIN: ICD-10-CM

## 2019-04-12 PROCEDURE — 99215 PR OFFICE/OUTPT VISIT, EST, LEVL V, 40-54 MIN: ICD-10-PCS | Mod: HCNC,S$GLB,, | Performed by: INTERNAL MEDICINE

## 2019-04-12 PROCEDURE — 99999 PR PBB SHADOW E&M-EST. PATIENT-LVL I: ICD-10-PCS | Mod: PBBFAC,HCNC,, | Performed by: INTERNAL MEDICINE

## 2019-04-12 PROCEDURE — 29581 APPL MULTLAYER CMPRN SYS LEG: CPT | Mod: 50,HCNC,S$GLB, | Performed by: NURSE PRACTITIONER

## 2019-04-12 PROCEDURE — 99999 PR PBB SHADOW E&M-EST. PATIENT-LVL I: CPT | Mod: PBBFAC,HCNC,, | Performed by: INTERNAL MEDICINE

## 2019-04-12 PROCEDURE — 99499 NO LOS: ICD-10-PCS | Mod: HCNC,S$GLB,, | Performed by: NURSE PRACTITIONER

## 2019-04-12 PROCEDURE — 99499 UNLISTED E&M SERVICE: CPT | Mod: HCNC,S$GLB,, | Performed by: NURSE PRACTITIONER

## 2019-04-12 PROCEDURE — 3078F DIAST BP <80 MM HG: CPT | Mod: HCNC,CPTII,S$GLB, | Performed by: INTERNAL MEDICINE

## 2019-04-12 PROCEDURE — 29581 PR APPL MLT-LAYER VENOUS WOUND COMPRESS BELOW KNEE: ICD-10-PCS | Mod: 50,HCNC,S$GLB, | Performed by: NURSE PRACTITIONER

## 2019-04-12 PROCEDURE — 1101F PR PT FALLS ASSESS DOC 0-1 FALLS W/OUT INJ PAST YR: ICD-10-PCS | Mod: HCNC,CPTII,S$GLB, | Performed by: INTERNAL MEDICINE

## 2019-04-12 PROCEDURE — 99215 OFFICE O/P EST HI 40 MIN: CPT | Mod: HCNC,S$GLB,, | Performed by: INTERNAL MEDICINE

## 2019-04-12 PROCEDURE — 3077F SYST BP >= 140 MM HG: CPT | Mod: HCNC,CPTII,S$GLB, | Performed by: INTERNAL MEDICINE

## 2019-04-12 PROCEDURE — 1101F PT FALLS ASSESS-DOCD LE1/YR: CPT | Mod: HCNC,CPTII,S$GLB, | Performed by: INTERNAL MEDICINE

## 2019-04-12 PROCEDURE — 99499 UNLISTED E&M SERVICE: CPT | Mod: HCNC,S$GLB,, | Performed by: INTERNAL MEDICINE

## 2019-04-12 PROCEDURE — 3078F PR MOST RECENT DIASTOLIC BLOOD PRESSURE < 80 MM HG: ICD-10-PCS | Mod: HCNC,CPTII,S$GLB, | Performed by: INTERNAL MEDICINE

## 2019-04-12 PROCEDURE — 99999 PR PBB SHADOW E&M-EST. PATIENT-LVL IV: ICD-10-PCS | Mod: PBBFAC,HCNC,, | Performed by: NURSE PRACTITIONER

## 2019-04-12 PROCEDURE — 99999 PR PBB SHADOW E&M-EST. PATIENT-LVL IV: CPT | Mod: PBBFAC,HCNC,, | Performed by: NURSE PRACTITIONER

## 2019-04-12 PROCEDURE — 99499 RISK ADDL DX/OHS AUDIT: ICD-10-PCS | Mod: HCNC,S$GLB,, | Performed by: INTERNAL MEDICINE

## 2019-04-12 PROCEDURE — 3077F PR MOST RECENT SYSTOLIC BLOOD PRESSURE >= 140 MM HG: ICD-10-PCS | Mod: HCNC,CPTII,S$GLB, | Performed by: INTERNAL MEDICINE

## 2019-04-12 NOTE — PROGRESS NOTES
Subjective:       Patient ID: Sherin Ortiz is a 74 y.o. female.    Chief Complaint: Wound Check    Wound Check     Wound Check:   This patient is seen today for reevaluation of recurrent ulcers to both feet and lower legs.  She was admitted to [facility] 3/22-3/28/19 for cellulitis of the left leg.  She was seen by both podiatry and infectious diseases while hospitalize and they felt the infection was consistent with cellulitis and recommended a 2-3 week course of antibiotics.  The MRI was inconclusive for osteo. She is seen today with Dr. Gomez to coordinate care. She has home health services through [facility]. Problems that interfere with wound healing include multiple co-morbidities, diabetes, edema, diabetic neuropathy and  morbid obesity. Because of safety issues we are unable to weigh the patient as she is unstable on her feet.  The patient is afebrile. The patient ambulates with great difficulty secondary to her body habitus and uses a motorized wheelchair which we cannot get on the scale to weigh her.  She does not complain of pain.  She denies increased swelling, redness or purulent drainage.     Review of Systems    Unchanged from previous visit.  Objective:      Physical Exam   Constitutional: She is oriented to person, place, and time. No distress.   Morbidly obese   HENT:   Head: Normocephalic and atraumatic.   Neck: Normal range of motion. Neck supple.   Pulmonary/Chest: Effort normal.   Musculoskeletal: Normal range of motion. She exhibits edema. She exhibits no tenderness.        Legs:       Feet:    Neurological: She is alert and oriented to person, place, and time.   Skin: Skin is warm and dry. Rash (dermatitis and tinea bilateral lower extremities) noted. She is not diaphoretic. No cyanosis or erythema. No pallor. Nails show no clubbing.   Psychiatric: She has a normal mood and affect. Her behavior is normal. Judgment and thought content normal.   Nursing note and vitals reviewed.     ..  Hemoglobin A1C   Date Value Ref Range Status   03/22/2019 9.8 (H) 4.0 - 5.6 % Final     Comment:     ADA Screening Guidelines:  5.7-6.4%  Consistent with prediabetes  >or=6.5%  Consistent with diabetes  High levels of fetal hemoglobin interfere with the HbA1C  assay. Heterozygous hemoglobin variants (HbS, HgC, etc)do  not significantly interfere with this assay.   However, presence of multiple variants may affect accuracy.     08/13/2018 10.6 (H) 4.0 - 5.6 % Final     Comment:     ADA Screening Guidelines:  5.7-6.4%  Consistent with prediabetes  >or=6.5%  Consistent with diabetes  High levels of fetal hemoglobin interfere with the HbA1C  assay. Heterozygous hemoglobin variants (HbS, HgC, etc)do  not significantly interfere with this assay.   However, presence of multiple variants may affect accuracy.     10/09/2017 9.9 (H) 4.0 - 5.6 % Final     Comment:     According to ADA guidelines, hemoglobin A1c <7.0% represents  optimal control in non-pregnant diabetic patients. Different  metrics may apply to specific patient populations.   Standards of Medical Care in Diabetes-2016.  For the purpose of screening for the presence of diabetes:  <5.7%     Consistent with the absence of diabetes  5.7-6.4%  Consistent with increasing risk for diabetes   (prediabetes)  >or=6.5%  Consistent with diabetes  Currently, no consensus exists for use of hemoglobin A1c  for diagnosis of diabetes for children.  This Hemoglobin A1c assay has significant interference with fetal   hemoglobin   (HbF). The results are invalid for patients with abnormal amounts of   HbF,   including those with known Hereditary Persistence   of Fetal Hemoglobin. Heterozygous hemoglobin variants (HbAS, HbAC,   HbAD, HbAE, HbA2) do not significantly interfere with this assay;   however, presence of multiple variants in a sample may impact the %   interference.       ..  Lab Results   Component Value Date    ALBUMIN 2.1 (L) 04/08/2019     Sherin was seen in the  clinic room and placed in the supine position on the treatment table.  The legs and feet were cleansed with Easi-clense sponges and dried thoroughly.  A hydrofiber and double mepilex foam dressing was applied to the foot wounds.  Eucerin cream was applied to the foot and ankle.  The patient's feet were positioned at a 90 degree angle.  Cast padding to provide offloading, followed by kerlix roll gauze and coban were applied using a spiral technique avoiding creases or folds.  The wraps were started covering the toes and progressing to above the ankle.  There was overlap of each turn half the width of the previous turn.  The football dressings will be changed every 3-4 days.    Assessment:       1. Skin ulcer of toe of right foot with fat layer exposed    2. Ulcer of right lower extremity, limited to breakdown of skin    3. Ulcer of lower limb, left, with fat layer exposed    4. Ulcer of right heel and midfoot with fat layer exposed    5. Ulcer of toe of left foot, limited to breakdown of skin    6. Ulcer of toe, right, with fat layer exposed    7. Venous insufficiency of both lower extremities    8. Tinea pedis of both feet    9. Venous stasis dermatitis of both lower extremities    10. Bilateral leg edema    11. Diabetic peripheral neuropathy associated with type 2 diabetes mellitus    12. Ulcer of left heel and midfoot, limited to breakdown of skin        Plan:           Cleanse wounds with wound cleanser.  Football dressings bilateral feet as detailed above.  Apply hydrofiber to leg wounds, cover with cotton gauze and roll gauze and secure with coban wraps for compression.  Patient was warned not to get the dressings wet and to use cast covers for showering.  Should the dressing become wet, she is to remove it, place a wet-to-dry dressing over the wound, cover with gauze and roll gauze and use ace wraps for compression and to secure bandages.  She should then notify home health as soon as possible to have a new  dressing applied.  Return to clinic in 4 weeks.  [facility] notified of orders via email. We will ask them to continue to see the patient three times weekly.    Home Health Orders:    Triamcinolone cream to bilateral lower legs.  Ketoconazole cream to bot feet.  Cleanse wounds with wound cleanser.  Apply hydrofiber and double foam dressings to bilateral heel ulcers.  Apply hydrofiber to toe ulcers.  Football dressings bilateral feet.  Apply mepilex foam to leg ulcers, cover with cotton gauze and secure with roll gauze.  Coban for compression bilateral lower legs.  Change leg and foot dressings twice weekly  Skilled nurse visit-2 x weekly              Left medial leg      Left posterior leg      Right dorsal foot      Left medial heel      Right anterior leg      Left dorsal foot      Right medial heel

## 2019-04-12 NOTE — PROGRESS NOTES
PICC LINE removal to right upper arm, tip intact, removed without any difficulty, no redness/swelling/bleeding/drainage to site, arm cleaned with saline and gauze, Vaseline gauze to site, 2x2 followed with coban to secure, pt tolerated without any difficulty, pt instructed to keep dressing in place for twenty four hours, pt instructed to call with any questions/concerns, pt verbalized understanding, pt moniotred 30 minutes post removal of PICC, no reactions/discomfort noted

## 2019-04-15 PROBLEM — N39.0 RECURRENT UTI: Status: RESOLVED | Noted: 2017-12-14 | Resolved: 2019-04-15

## 2019-04-15 NOTE — PROGRESS NOTES
Subjective:      Chief Complaint: Follow-up for cellulitis    History of Present Illness  74 y.o. female with a past medical history of NICM, chronic venous stasis dermatitis, chronic LE wounds, controlled DM2, HTN, CKD stage 3 presents for follow-up of cellulitis.  Patient presented with two days of left heel pain.  Concern for underlying osteomyelitis - MRI could not rule out early osteomyelitis.  Podiatry evaluated patient, no concern for osteomyelitis - recommended treatment for cellulitis.  Patient was discharged on a course of vanc / meropenem.       Pt presents for evaluation of left heel pain of two day duration. Pain had 10/10 intensity and came on abruptly while seated in her wheelchair. Pain felt like deep pressure on the left heel underneath one of her chronic wounds. Pain is exacerbated by transfers from her wheelchair. She cannot think of what may have caused this pain as she denies trauma to the area and even takes care to transfer on inverted feet so as not to apply direct pressure on the wound. She reports clear drainage from the wound, but states this has been an ongoing issue and is not new. She visits with wound care Nurse Lyle to receive treatment of multiple wounds and ulcers on bilateral lower extremities. She states she is adherent to the regiment prescribed and has been receiving home visits from skilled nursing three times per week. Despite this, she unfortunately has developed a new ulcer on her right leg.      She denies fever, purulent drainage or recent weight loss. Review of systems was positive for nausea with one episode of self-induced vomiting which resolved. Other than transfers, pt does not ambulate.          Review of Systems   Constitutional: Negative for chills, diaphoresis, fever and weight loss.   HENT: Negative for congestion, sinus pain and sore throat.    Eyes: Negative for photophobia and pain.   Respiratory: Negative for cough, sputum production and shortness of  breath.    Cardiovascular: Negative for chest pain and leg swelling.   Gastrointestinal: Negative for abdominal pain, diarrhea, nausea and vomiting.   Genitourinary: Negative for dysuria and hematuria.   Musculoskeletal: Negative for joint pain.   Skin: Negative for rash.   Neurological: Negative for focal weakness and headaches.   Psychiatric/Behavioral: Negative for depression. The patient is not nervous/anxious.        Objective:   Physical Exam   Constitutional: She is oriented to person, place, and time. She appears well-developed and well-nourished. No distress.   HENT:   Head: Normocephalic and atraumatic.   Eyes: Conjunctivae and EOM are normal.   Neck: Normal range of motion. Neck supple.   Cardiovascular: Normal rate.   Pulmonary/Chest: Effort normal. No respiratory distress.   Abdominal: Soft. She exhibits no distension.   Musculoskeletal: Normal range of motion. She exhibits no edema.   Neurological: She is alert and oriented to person, place, and time.   Skin: Skin is warm and dry. No rash noted. She is not diaphoretic. No erythema.   Bilateral lower extremity swelling, chronic venous stasis changes, multiple ulcerations on left shin, left heel, right shin     Psychiatric: She has a normal mood and affect. Her behavior is normal.   Vitals reviewed.    Wound care photos:                                          Significant labs and imaging reviewed:   Ref Range & Units 7d ago   Sodium 136 - 145 mmol/L 139    Potassium 3.5 - 5.1 mmol/L 4.2    Chloride 95 - 110 mmol/L 100    CO2 23 - 29 mmol/L 27    Glucose 70 - 110 mg/dL 232High     BUN, Bld 8 - 23 mg/dL 19    Creatinine 0.5 - 1.4 mg/dL 1.3    Calcium 8.7 - 10.5 mg/dL 8.7    Total Protein 6.0 - 8.4 g/dL 7.6    Albumin 3.5 - 5.2 g/dL 2.1Low     Total Bilirubin 0.1 - 1.0 mg/dL 0.5    Alkaline Phosphatase 55 - 135 U/L 153High     AST 10 - 40 U/L 22    ALT 10 - 44 U/L 11    Anion Gap 8 - 16 mmol/L 12    eGFR if African American >60 mL/min/1.73 m^2  46.0Abnormal     eGFR if non African American >60 mL/min/1.73 m^2 39.9Abnormal       Ref Range & Units 7d ago   WBC 3.90 - 12.70 K/uL 5.38    RBC 4.00 - 5.40 M/uL 3.56Low     Hemoglobin 12.0 - 16.0 g/dL 10.1Low     Hematocrit 37.0 - 48.5 % 33.3Low     MCV 82 - 98 fL 94    MCH 27.0 - 31.0 pg 28.4    MCHC 32.0 - 36.0 g/dL 30.3Low     RDW 11.5 - 14.5 % 13.3    Platelets 150 - 350 K/uL 397High     MPV 9.2 - 12.9 fL 9.4       Ref Range & Units 7d ago  (4/8/19) 3wk ago  (3/22/19) 3yr ago  (12/1/15) 3yr ago  (11/29/15)   CRP 0.0 - 8.2 mg/L 39.4High   216.2High   139.6High   161.8High          7d ago  (4/8/19) 3wk ago  (3/22/19) 3yr ago  (12/1/15) 3yr ago  (11/29/15)    Sed Rate 0 - 36 mm/Hr 88High   90High   103High  R 91High  R     Aerobic culture  3/22/2019  Aerobic Bacterial Culture PSEUDOMONAS AERUGINOSA   Many       Aerobic Bacterial Culture PROVIDENCIA STUARTII   Many   Skin lyndsey also present       Resulting Agency [facility]   Susceptibility      Pseudomonas aeruginosa     CULTURE, AEROBIC  (SPECIFY SOURCE)     Amikacin <=16 mcg/mL Sensitive     Cefepime <=8 mcg/mL Sensitive     Ciprofloxacin >2 mcg/mL Resistant     Gentamicin 8 mcg/mL Intermediate     Meropenem <=4 mcg/mL Sensitive     Piperacillin/Tazo <=16 mcg/mL Sensitive     Tobramycin <=4 mcg/mL Sensitive            Linear View   Susceptibility      Providencia stuartii     CULTURE, AEROBIC  (SPECIFY SOURCE)     Amikacin <=16 mcg/mL Sensitive     Amp/Sulbactam >16/8 mcg/mL Resistant     Cefepime <=8 mcg/mL Sensitive     Ceftriaxone 32 mcg/mL Intermediate     Ciprofloxacin >2 mcg/mL Resistant     Ertapenem <=2 mcg/mL Sensitive     Gentamicin 8 mcg/mL Resistant     Meropenem <=4 mcg/mL Sensitive     Piperacillin/Tazo <=16 mcg/mL Sensitive     Tobramycin 8 mcg/mL Resistant     Trimeth/Sulfa <=2/38 mcg/mL Sensitive         Aerobic Bacterial Culture KLEBSIELLA OXYTOCA   Moderate       Aerobic Bacterial Culture MORGANELLA MORGANII   Moderate       Aerobic Bacterial  Culture METHICILLIN RESISTANT STAPHYLOCOCCUS AUREUS   Many       Aerobic Bacterial Culture PROTEUS MIRABILIS   Few       Resulting Agency [facility]   Susceptibility      Klebsiella oxytoca     CULTURE, AEROBIC  (SPECIFY SOURCE)     Amox/K Clav'ate <=8/4 mcg/mL Sensitive     Amp/Sulbactam 16/8 mcg/mL Intermediate     Ampicillin >16 mcg/mL Resistant     Cefazolin >16 mcg/mL Resistant     Cefepime <=8 mcg/mL Sensitive     Ceftriaxone <=8 mcg/mL Sensitive     Ciprofloxacin <=1 mcg/mL Sensitive     Ertapenem <=2 mcg/mL Sensitive     Gentamicin <=4 mcg/mL Sensitive     Meropenem <=4 mcg/mL Sensitive     Piperacillin/Tazo <=16 mcg/mL Sensitive     Tetracycline <=4 mcg/mL Sensitive     Tobramycin <=4 mcg/mL Sensitive     Trimeth/Sulfa <=2/38 mcg/mL Sensitive            Linear View   Susceptibility      Morganella morganii     CULTURE, AEROBIC  (SPECIFY SOURCE)     Amp/Sulbactam 16/8 mcg/mL Intermediate     Cefepime <=8 mcg/mL Sensitive     Ceftriaxone <=8 mcg/mL Sensitive     Ciprofloxacin <=1 mcg/mL Sensitive     Ertapenem <=2 mcg/mL Sensitive     Gentamicin <=4 mcg/mL Sensitive     Meropenem <=4 mcg/mL Sensitive     Piperacillin/Tazo <=16 mcg/mL Sensitive     Tobramycin <=4 mcg/mL Sensitive     Trimeth/Sulfa <=2/38 mcg/mL Sensitive            Linear View   Susceptibility      Methicillin resistant staphylococcus aureus     CULTURE, AEROBIC  (SPECIFY SOURCE)     Clindamycin >4 mcg/mL Resistant     Erythromycin 4 mcg/mL Intermediate     Oxacillin >2 mcg/mL Resistant     Penicillin 2 mcg/mL Resistant     Tetracycline >8 mcg/mL Resistant     Trimeth/Sulfa <=0.5/9.5 m... Sensitive     Vancomycin 2 mcg/mL Sensitive            Linear View   Susceptibility      Proteus mirabilis     CULTURE, AEROBIC  (SPECIFY SOURCE)     Amox/K Clav'ate <=8/4 mcg/mL Sensitive     Amp/Sulbactam <=8/4 mcg/mL Sensitive     Ampicillin <=8 mcg/mL Sensitive     Cefazolin <=8 mcg/mL Sensitive     Cefepime <=8 mcg/mL Sensitive     Ceftriaxone <=8 mcg/mL  Sensitive     Ciprofloxacin >2 mcg/mL Resistant     Ertapenem <=2 mcg/mL Sensitive     Gentamicin <=4 mcg/mL Sensitive     Meropenem <=4 mcg/mL Sensitive     Piperacillin/Tazo <=16 mcg/mL Sensitive     Tobramycin <=4 mcg/mL Sensitive     Trimeth/Sulfa <=2/38 mcg/mL Sensitive          CULTURE, AEROBIC  (SPECIFY SOURCE) CULTURE, AEROBIC  (SPECIFY SOURCE)     Amikacin <=16 mcg/mL Sensitive       Amox/K Clav'ate   <=8/4 mcg/mL Sensitive     Amp/Sulbactam   <=8/4 mcg/mL Sensitive     Ampicillin   >16 mcg/mL Resistant     Cefazolin   >16 mcg/mL Resistant     Cefepime <=8 mcg/mL Sensitive <=8 mcg/mL Sensitive     Ceftriaxone   <=8 mcg/mL Sensitive     Ciprofloxacin >2 mcg/mL Resistant <=1 mcg/mL Sensitive     Ertapenem   <=2 mcg/mL Sensitive     Gentamicin <=4 mcg/mL Sensitive <=4 mcg/mL Sensitive     Meropenem <=4 mcg/mL Sensitive <=4 mcg/mL Sensitive     Piperacillin/Tazo <=16 mcg/mL Sensitive <=16 mcg/mL Sensitive     Tetracycline   <=4 mcg/mL Sensitive     Tobramycin <=4 mcg/mL Sensitive <=4 mcg/mL Sensitive     Trimeth/Sulfa   <=2/38 mcg/mL Sensitive      3/23/2019  Left Heel Wound  Aerobic Bacterial Culture ENTEROBACTER AEROGENES   Few       Aerobic Bacterial Culture METHICILLIN RESISTANT STAPHYLOCOCCUS AUREUS   Many       Aerobic Bacterial Culture ENTEROBACTER CLOACAE   Few   Skin lyndsey also present       Resulting Agency [facility]   Susceptibility      Enterobacter aerogenes     CULTURE, AEROBIC  (SPECIFY SOURCE)     Cefepime <=8 mcg/mL Sensitive     Ceftriaxone <=8 mcg/mL Sensitive     Ciprofloxacin <=1 mcg/mL Sensitive     Ertapenem <=2 mcg/mL Sensitive     Gentamicin <=4 mcg/mL Sensitive     Meropenem <=4 mcg/mL Sensitive     Piperacillin/Tazo <=16 mcg/mL Sensitive     Tetracycline <=4 mcg/mL Sensitive     Tobramycin <=4 mcg/mL Sensitive     Trimeth/Sulfa <=2/38 mcg/mL Sensitive            Linear View   Susceptibility      Methicillin resistant staphylococcus aureus     CULTURE, AEROBIC  (SPECIFY SOURCE)      Clindamycin >4 mcg/mL Resistant     Erythromycin >4 mcg/mL Resistant     Oxacillin >2 mcg/mL Resistant     Penicillin 2 mcg/mL Resistant     Tetracycline >8 mcg/mL Resistant     Trimeth/Sulfa <=0.5/9.5 m... Sensitive     Vancomycin 2 mcg/mL Sensitive            Linear View   Susceptibility      Enterobacter cloacae     CULTURE, AEROBIC  (SPECIFY SOURCE)     Cefepime <=8 mcg/mL Sensitive     Ceftriaxone <=8 mcg/mL Sensitive     Ciprofloxacin <=1 mcg/mL Sensitive     Ertapenem <=2 mcg/mL Sensitive     Gentamicin <=4 mcg/mL Sensitive     Meropenem <=4 mcg/mL Sensitive     Piperacillin/Tazo <=16 mcg/mL Sensitive     Tetracycline <=4 mcg/mL Sensitive     Tobramycin <=4 mcg/mL Sensitive     Trimeth/Sulfa <=2/38 mcg/mL Sensitive         3/23/2019  MRI foot  Destructive/ neuropathic hindfoot joint changes with nonunion calcaneal fracture and extensive reactive marrow edema.  Superimposed osteomyelitis may be present.    Assessment:   74 y.o. female with a past medical history of NICM, chronic venous stasis dermatitis, chronic LE wounds, controlled DM2, HTN, CKD stage 3 presents for follow-up of left heel cellulitis - deep wound cultures with Enterobacter, MRSA, Pseudomonas, Klebsiella, Providencia, Morganella, Proteus - now resolved after 3 weeks IV vanc / mo    Plan:   - Discontinue vanc / meropenem  - Remove PICC line  - Follow-up with wound care    RTC prn    Teresa Gomez MD MPH  [facility]

## 2019-04-26 ENCOUNTER — TELEPHONE (OUTPATIENT)
Dept: ADMINISTRATIVE | Facility: CLINIC | Age: 74
End: 2019-04-26

## 2019-04-26 NOTE — TELEPHONE ENCOUNTER
Home Health SOC 03/29/2019 - 05/27/2019 with [facility] - Dr. Valorie Saenz. Patient received SN and OT services.

## 2019-05-08 ENCOUNTER — NURSE TRIAGE (OUTPATIENT)
Dept: ADMINISTRATIVE | Facility: CLINIC | Age: 74
End: 2019-05-08

## 2019-05-08 NOTE — TELEPHONE ENCOUNTER
"  Reason for Disposition   Entire arm is swollen    Protocols used: ARM PAIN-A-OH    Ms. Ortiz c/o L. Arm swelling and joint pain. On and off over the past couple of years states that it occurred from Iv Insertion years ago after a "blown" vein.  C/o of arm pain 10/10 when using arm, near the wrist and is having severe swelling. Patient advised to go to the ED and declines, is asking for home care instructions. Will send a message to PCP Staff.     "

## 2019-05-08 NOTE — TELEPHONE ENCOUNTER
Please ask the patient if she still has Home Health, she can have them contact me with an evaluation or she can have an appt.

## 2019-05-09 ENCOUNTER — HOSPITAL ENCOUNTER (INPATIENT)
Facility: HOSPITAL | Age: 74
LOS: 5 days | Discharge: HOME | DRG: 638 | End: 2019-05-14
Attending: EMERGENCY MEDICINE | Admitting: INTERNAL MEDICINE
Payer: MEDICARE

## 2019-05-09 ENCOUNTER — TELEPHONE (OUTPATIENT)
Dept: INTERNAL MEDICINE | Facility: CLINIC | Age: 74
End: 2019-05-09

## 2019-05-09 DIAGNOSIS — L03.114 CELLULITIS OF LEFT HAND: ICD-10-CM

## 2019-05-09 DIAGNOSIS — M86.9 OSTEOMYELITIS OF TOE: ICD-10-CM

## 2019-05-09 DIAGNOSIS — Z87.898 HX OF PROLONGED Q-T INTERVAL ON ECG: ICD-10-CM

## 2019-05-09 DIAGNOSIS — L03.114 CELLULITIS OF HAND, LEFT: Primary | ICD-10-CM

## 2019-05-09 DIAGNOSIS — R94.31 PROLONGED Q-T INTERVAL ON ECG: ICD-10-CM

## 2019-05-09 DIAGNOSIS — M86.9 OSTEOMYELITIS: ICD-10-CM

## 2019-05-09 DIAGNOSIS — M25.532 LEFT WRIST PAIN: ICD-10-CM

## 2019-05-09 LAB
ANION GAP SERPL CALC-SCNC: 8 MMOL/L (ref 8–16)
APPEARANCE FLD: NORMAL
BASOPHILS # BLD AUTO: 0.02 K/UL (ref 0–0.2)
BASOPHILS NFR BLD: 0.3 % (ref 0–1.9)
BODY FLD TYPE: NORMAL
BODY FLD TYPE: NORMAL
BUN SERPL-MCNC: 21 MG/DL (ref 8–23)
CALCIUM SERPL-MCNC: 9.1 MG/DL (ref 8.7–10.5)
CHLORIDE SERPL-SCNC: 102 MMOL/L (ref 95–110)
CO2 SERPL-SCNC: 23 MMOL/L (ref 23–29)
COLOR FLD: NORMAL
CREAT SERPL-MCNC: 1.3 MG/DL (ref 0.5–1.4)
CRP SERPL-MCNC: 98.4 MG/L (ref 0–8.2)
CRYSTALS FLD MICRO: NEGATIVE
DIFFERENTIAL METHOD: ABNORMAL
EOSINOPHIL # BLD AUTO: 0 K/UL (ref 0–0.5)
EOSINOPHIL NFR BLD: 0.1 % (ref 0–8)
ERYTHROCYTE [DISTWIDTH] IN BLOOD BY AUTOMATED COUNT: 14.4 % (ref 11.5–14.5)
ERYTHROCYTE [SEDIMENTATION RATE] IN BLOOD BY WESTERGREN METHOD: 104 MM/HR (ref 0–36)
EST. GFR  (AFRICAN AMERICAN): 46 ML/MIN/1.73 M^2
EST. GFR  (NON AFRICAN AMERICAN): 39.9 ML/MIN/1.73 M^2
GLUCOSE SERPL-MCNC: 333 MG/DL (ref 70–110)
GRAM STN SPEC: NORMAL
HCT VFR BLD AUTO: 31.2 % (ref 37–48.5)
HGB BLD-MCNC: 10 G/DL (ref 12–16)
IMM GRANULOCYTES # BLD AUTO: 0.01 K/UL (ref 0–0.04)
IMM GRANULOCYTES NFR BLD AUTO: 0.1 % (ref 0–0.5)
LYMPHOCYTES # BLD AUTO: 1 K/UL (ref 1–4.8)
LYMPHOCYTES NFR BLD: 14 % (ref 18–48)
LYMPHOCYTES NFR FLD MANUAL: 14 %
MCH RBC QN AUTO: 28.8 PG (ref 27–31)
MCHC RBC AUTO-ENTMCNC: 32.1 G/DL (ref 32–36)
MCV RBC AUTO: 90 FL (ref 82–98)
MONOCYTES # BLD AUTO: 0.6 K/UL (ref 0.3–1)
MONOCYTES NFR BLD: 9 % (ref 4–15)
MONOS+MACROS NFR FLD MANUAL: 3 %
NEUTROPHILS # BLD AUTO: 5.5 K/UL (ref 1.8–7.7)
NEUTROPHILS NFR BLD: 76.5 % (ref 38–73)
NEUTROPHILS NFR FLD MANUAL: 83 %
NRBC BLD-RTO: 0 /100 WBC
PLATELET # BLD AUTO: 310 K/UL (ref 150–350)
PMV BLD AUTO: 9 FL (ref 9.2–12.9)
POTASSIUM SERPL-SCNC: 4.5 MMOL/L (ref 3.5–5.1)
PROCALCITONIN SERPL IA-MCNC: 0.07 NG/ML
PROCALCITONIN SERPL IA-MCNC: 0.08 NG/ML
RBC # BLD AUTO: 3.47 M/UL (ref 4–5.4)
SODIUM SERPL-SCNC: 133 MMOL/L (ref 136–145)
WBC # BLD AUTO: 7.15 K/UL (ref 3.9–12.7)
WBC # FLD: 5505 /CU MM

## 2019-05-09 PROCEDURE — 99285 PR EMERGENCY DEPT VISIT,LEVEL V: ICD-10-PCS | Mod: ,,, | Performed by: EMERGENCY MEDICINE

## 2019-05-09 PROCEDURE — 63600175 PHARM REV CODE 636 W HCPCS: Mod: HCNC | Performed by: PHYSICIAN ASSISTANT

## 2019-05-09 PROCEDURE — 99220 PR INITIAL OBSERVATION CARE,LEVL III: CPT | Mod: HCNC,,, | Performed by: PHYSICIAN ASSISTANT

## 2019-05-09 PROCEDURE — G0378 HOSPITAL OBSERVATION PER HR: HCPCS | Mod: HCNC

## 2019-05-09 PROCEDURE — 99285 EMERGENCY DEPT VISIT HI MDM: CPT | Mod: ,,, | Performed by: EMERGENCY MEDICINE

## 2019-05-09 PROCEDURE — 85025 COMPLETE CBC W/AUTO DIFF WBC: CPT | Mod: HCNC

## 2019-05-09 PROCEDURE — 86140 C-REACTIVE PROTEIN: CPT | Mod: HCNC

## 2019-05-09 PROCEDURE — 84145 PROCALCITONIN (PCT): CPT | Mod: HCNC

## 2019-05-09 PROCEDURE — 87206 SMEAR FLUORESCENT/ACID STAI: CPT | Mod: 91,HCNC

## 2019-05-09 PROCEDURE — 99220 PR INITIAL OBSERVATION CARE,LEVL III: ICD-10-PCS | Mod: HCNC,,, | Performed by: PHYSICIAN ASSISTANT

## 2019-05-09 PROCEDURE — 96375 TX/PRO/DX INJ NEW DRUG ADDON: CPT | Mod: HCNC

## 2019-05-09 PROCEDURE — 89051 BODY FLUID CELL COUNT: CPT | Mod: HCNC

## 2019-05-09 PROCEDURE — 82962 GLUCOSE BLOOD TEST: CPT | Mod: HCNC

## 2019-05-09 PROCEDURE — 12000002 HC ACUTE/MED SURGE SEMI-PRIVATE ROOM: Mod: HCNC

## 2019-05-09 PROCEDURE — 96372 THER/PROPH/DIAG INJ SC/IM: CPT | Mod: HCNC,59

## 2019-05-09 PROCEDURE — 80048 BASIC METABOLIC PNL TOTAL CA: CPT | Mod: HCNC

## 2019-05-09 PROCEDURE — 87116 MYCOBACTERIA CULTURE: CPT | Mod: HCNC

## 2019-05-09 PROCEDURE — 87102 FUNGUS ISOLATION CULTURE: CPT | Mod: HCNC

## 2019-05-09 PROCEDURE — 96365 THER/PROPH/DIAG IV INF INIT: CPT | Mod: HCNC

## 2019-05-09 PROCEDURE — 87075 CULTR BACTERIA EXCEPT BLOOD: CPT | Mod: HCNC

## 2019-05-09 PROCEDURE — 63600175 PHARM REV CODE 636 W HCPCS: Mod: HCNC | Performed by: EMERGENCY MEDICINE

## 2019-05-09 PROCEDURE — S5571 INSULIN DISPOS PEN 3 ML: HCPCS | Mod: HCNC | Performed by: PHYSICIAN ASSISTANT

## 2019-05-09 PROCEDURE — 25000003 PHARM REV CODE 250: Mod: HCNC | Performed by: EMERGENCY MEDICINE

## 2019-05-09 PROCEDURE — 99285 EMERGENCY DEPT VISIT HI MDM: CPT | Mod: 25,HCNC

## 2019-05-09 PROCEDURE — A9585 GADOBUTROL INJECTION: HCPCS | Mod: HCNC | Performed by: EMERGENCY MEDICINE

## 2019-05-09 PROCEDURE — 87205 SMEAR GRAM STAIN: CPT | Mod: HCNC

## 2019-05-09 PROCEDURE — 25500020 PHARM REV CODE 255: Mod: HCNC | Performed by: EMERGENCY MEDICINE

## 2019-05-09 PROCEDURE — 87070 CULTURE OTHR SPECIMN AEROBIC: CPT | Mod: 59,HCNC

## 2019-05-09 PROCEDURE — 85652 RBC SED RATE AUTOMATED: CPT | Mod: HCNC

## 2019-05-09 PROCEDURE — 89060 EXAM SYNOVIAL FLUID CRYSTALS: CPT | Mod: HCNC

## 2019-05-09 PROCEDURE — 87015 SPECIMEN INFECT AGNT CONCNTJ: CPT | Mod: HCNC

## 2019-05-09 RX ORDER — POLYETHYLENE GLYCOL 3350 17 G/17G
17 POWDER, FOR SOLUTION ORAL 2 TIMES DAILY PRN
Status: DISCONTINUED | OUTPATIENT
Start: 2019-05-10 | End: 2019-05-14 | Stop reason: HOSPADM

## 2019-05-09 RX ORDER — VANCOMYCIN 1.75 GRAM/500 ML IN 0.9 % SODIUM CHLORIDE INTRAVENOUS
15
Status: COMPLETED | OUTPATIENT
Start: 2019-05-09 | End: 2019-05-10

## 2019-05-09 RX ORDER — NAPROXEN SODIUM 220 MG/1
81 TABLET, FILM COATED ORAL DAILY
Status: DISCONTINUED | OUTPATIENT
Start: 2019-05-10 | End: 2019-05-14 | Stop reason: HOSPADM

## 2019-05-09 RX ORDER — GADOBUTROL 604.72 MG/ML
10 INJECTION INTRAVENOUS
Status: COMPLETED | OUTPATIENT
Start: 2019-05-09 | End: 2019-05-09

## 2019-05-09 RX ORDER — RAMELTEON 8 MG/1
8 TABLET ORAL NIGHTLY PRN
Status: DISCONTINUED | OUTPATIENT
Start: 2019-05-10 | End: 2019-05-14 | Stop reason: HOSPADM

## 2019-05-09 RX ORDER — TRAMADOL HYDROCHLORIDE 50 MG/1
50 TABLET ORAL
Status: COMPLETED | OUTPATIENT
Start: 2019-05-09 | End: 2019-05-09

## 2019-05-09 RX ORDER — ACETAMINOPHEN 325 MG/1
650 TABLET ORAL EVERY 4 HOURS PRN
Status: DISCONTINUED | OUTPATIENT
Start: 2019-05-10 | End: 2019-05-14 | Stop reason: HOSPADM

## 2019-05-09 RX ORDER — ATORVASTATIN CALCIUM 20 MG/1
40 TABLET, FILM COATED ORAL NIGHTLY
Status: DISCONTINUED | OUTPATIENT
Start: 2019-05-10 | End: 2019-05-14 | Stop reason: HOSPADM

## 2019-05-09 RX ORDER — INSULIN ASPART 100 [IU]/ML
1-10 INJECTION, SOLUTION INTRAVENOUS; SUBCUTANEOUS EVERY 6 HOURS PRN
Status: DISCONTINUED | OUTPATIENT
Start: 2019-05-10 | End: 2019-05-14 | Stop reason: HOSPADM

## 2019-05-09 RX ORDER — SODIUM CHLORIDE 0.9 % (FLUSH) 0.9 %
10 SYRINGE (ML) INJECTION
Status: DISCONTINUED | OUTPATIENT
Start: 2019-05-10 | End: 2019-05-14 | Stop reason: HOSPADM

## 2019-05-09 RX ORDER — GLUCAGON 1 MG
1 KIT INJECTION
Status: DISCONTINUED | OUTPATIENT
Start: 2019-05-10 | End: 2019-05-14 | Stop reason: HOSPADM

## 2019-05-09 RX ORDER — HYDROCODONE BITARTRATE AND ACETAMINOPHEN 10; 325 MG/1; MG/1
1 TABLET ORAL EVERY 4 HOURS PRN
Status: DISCONTINUED | OUTPATIENT
Start: 2019-05-10 | End: 2019-05-14 | Stop reason: HOSPADM

## 2019-05-09 RX ORDER — CLOPIDOGREL BISULFATE 75 MG/1
75 TABLET ORAL DAILY
Status: DISCONTINUED | OUTPATIENT
Start: 2019-05-10 | End: 2019-05-10

## 2019-05-09 RX ORDER — ONDANSETRON 2 MG/ML
8 INJECTION INTRAMUSCULAR; INTRAVENOUS EVERY 8 HOURS PRN
Status: DISCONTINUED | OUTPATIENT
Start: 2019-05-10 | End: 2019-05-14 | Stop reason: HOSPADM

## 2019-05-09 RX ORDER — PROMETHAZINE HYDROCHLORIDE 25 MG/1
25 SUPPOSITORY RECTAL EVERY 6 HOURS PRN
Status: DISCONTINUED | OUTPATIENT
Start: 2019-05-10 | End: 2019-05-14 | Stop reason: HOSPADM

## 2019-05-09 RX ORDER — HYDROCODONE BITARTRATE AND ACETAMINOPHEN 5; 325 MG/1; MG/1
1 TABLET ORAL EVERY 4 HOURS PRN
Status: DISCONTINUED | OUTPATIENT
Start: 2019-05-10 | End: 2019-05-14 | Stop reason: HOSPADM

## 2019-05-09 RX ADMIN — INSULIN ASPART 2 UNITS: 100 INJECTION, SOLUTION INTRAVENOUS; SUBCUTANEOUS at 11:05

## 2019-05-09 RX ADMIN — TRAMADOL HYDROCHLORIDE 50 MG: 50 TABLET, FILM COATED ORAL at 01:05

## 2019-05-09 RX ADMIN — GADOBUTROL 10 ML: 604.72 INJECTION INTRAVENOUS at 03:05

## 2019-05-09 RX ADMIN — INSULIN DETEMIR 18 UNITS: 100 INJECTION, SOLUTION SUBCUTANEOUS at 11:05

## 2019-05-09 RX ADMIN — VANCOMYCIN HYDROCHLORIDE 1750 MG: 100 INJECTION, POWDER, LYOPHILIZED, FOR SOLUTION INTRAVENOUS at 11:05

## 2019-05-09 RX ADMIN — CEFTRIAXONE 2 G: 2 INJECTION, SOLUTION INTRAVENOUS at 10:05

## 2019-05-09 NOTE — SUBJECTIVE & OBJECTIVE
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    Benign essential hypertension 11/14/2012    Cataract     s/p phacoemulsification    Chronic kidney disease (CKD), stage III (moderate) 9/12/2013    Diabetic peripheral neuropathy associated with type 2 diabetes mellitus 11/14/2014    causing right hemiparesis    Gait disorder     Hyperlipidemia     Iritis - Both Eyes 6/10/2013    Kidney stone     Lymphedema     Morbid obesity with BMI of 40.0-44.9, adult 2/18/2015    Nephrolithiasis 4/20/2016    NS (nuclear sclerosis) 4/1/2013    Nuclear sclerosis - Both Eyes 4/29/2013    Preseptal cellulitis - Right Eye 4/29/2013    Proliferative diabetic retinopathy - Both Eyes 4/29/2013    Proliferative diabetic retinopathy, both eyes 4/1/2013    PSC (posterior subcapsular cataract) - Both Eyes 4/29/2013    S/P hernia repair 12/19/2012    TIA (transient ischemic attack) 11/18/2014    Tinea pedis 7/24/2012    Tinea pedis is present on both feet.     Type 2 diabetes mellitus with renal manifestations, controlled 12/12/2013    Type 2 diabetes, controlled, with moderate nonproliferative diabetic retinopathy without macular edema 9/17/2015    Ulcer of left lower extremity, limited to breakdown of skin 7/8/2015    Unspecified cerebral artery occlusion with cerebral infarction 11/16/2014    Unspecified venous (peripheral) insufficiency     Ureteral stone with hydronephrosis 1/27/2016    UTI (lower urinary tract infection)     Vaginal infection     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       Past Surgical History:   Procedure Laterality Date    APPENDECTOMY      CATARACT EXTRACTION W/  INTRAOCULAR LENS IMPLANT Left 5/21/2013    CATARACT EXTRACTION W/  INTRAOCULAR LENS IMPLANT Right 6/4/2013    CHOLECYSTECTOMY      COLONOSCOPY  12/22/2005    normal    CYSTOSCOPY  4/20/2016    [facility]    CYSTOSCOPY WITH STENT  "PLACEMENT Right 1/27/2016    [facility]    ESOPHAGOGASTRODUODENOSCOPY  12/21/2015    hiatal hernia, Schatzki ring    ESOPHAGOGASTRODUODENOSCOPY (EGD) N/A 6/30/2014    [facility] at [facility] (2ND FLR)    EYE SURGERY Bilateral 2008    laser surgery both eyes    INSERTION, IOL PROSTHESIS Right 6/4/2013    Per[facility]ico Schwartz [facility] at [facility] OR 1ST FLR    INSERTION, IOL PROSTHESIS Left 5/21/2013    [facility] at [facility] OR 1ST FLR    NASAL SEPTUM SURGERY      PHACOEMULSIFICATION, CATARACT Right 6/4/2013    [facility] at [facility] OR 1ST FLR    PHACOEMULSIFICATION, CATARACT Left 5/21/2013    [facility] at [facility] OR 1ST FLR    PYELOGRAM-RETROGRADE Right 4/20/2016    [facility] at [facility] OR    REMOVAL-STENT-URETERAL Right 4/20/2016    [facility] at [facility] OR    SUBTOTAL COLECTOMY  12/13/2012    transverse colon, for incarcerated umbilical hernia, Dr. Kat Bower    URETEROSCOPY Right 4/20/2016    Performed by [facility] at [facility] OR       Review of patient's allergies indicates:   Allergen Reactions    Penicillins Hives     Other reaction(s): Hives    Sulfa (sulfonamide antibiotics) Other (See Comments)     Shakes, pt states her doctor told her the shakes were possibly caused by an allergy to sulfa       Current Facility-Administered Medications   Medication    traMADol tablet 50 mg     Current Outpatient Medications   Medication Sig    ACCU-CHEK RAMIRO PLUS TEST STRP Strp TEST TWICE DAILY    ACCU-CHEK SOFTCLIX LANCETS Misc Test twice daily    aspirin 81 MG Chew Take 81 mg by mouth once daily.      atorvastatin (LIPITOR) 40 MG tablet TAKE 1 TABLET EVERY EVENING    BD INSULIN SYRINGE ULTRA-FINE 1/2 mL 30 gauge x 1/2" Syrg USE EVERY NIGHT    bumetanide (BUMEX) 1 MG tablet Take 2 tablets (2 mg total) by " mouth once daily.    clopidogrel (PLAVIX) 75 mg tablet TAKE 1 TABLET EVERY DAY    diclofenac sodium 1 % Gel Apply 2 g topically nightly as needed. (Patient taking differently: Apply 2 g topically nightly as needed (pain). )    LANTUS U-100 INSULIN 100 unit/mL injection INJECT 7 TO 10 UNITS SUBCUTANEOUSLY IN THE EVENING DEPENDING ON SCALE (DISCARD EACH VIAL AFTER 28 DAYS) (Patient taking differently: INJECT 25 UNITS SUBCUTANEOUSLY IN THE EVENING DEPENDING ON SCALE (DISCARD EACH VIAL AFTER 28 DAYS))    vancomycin HCl in 5 % dextrose (VANCOMYCIN IN 5 % DEXTROSE) 1.25 gram/250 mL Soln Inject 250 mLs (1,250 mg total) into the vein once daily.     Family History     Problem Relation (Age of Onset)    Cataracts Sister, Brother    Diabetes Sister    Heart disease Brother    Leukemia Mother        Tobacco Use    Smoking status: Former Smoker     Packs/day: 0.50     Years: 15.00     Pack years: 7.50     Types: Cigarettes     Last attempt to quit: 1982     Years since quittin.8    Smokeless tobacco: Former User    Tobacco comment: smoked one pack per week   Substance and Sexual Activity    Alcohol use: No    Drug use: No    Sexual activity: Not Currently     ROS   Per ED ROS    Objective:     Vital Signs (Most Recent):  Temp: 98.6 °F (37 °C) (19 1155)  Pulse: 80 (19 1155)  Resp: 16 (19 1155)  BP: (!) 146/80 (19 1155)  SpO2: 100 % (19 1155) Vital Signs (24h Range):  Temp:  [98.6 °F (37 °C)] 98.6 °F (37 °C)  Pulse:  [80] 80  Resp:  [16] 16  SpO2:  [100 %] 100 %  BP: (146)/(80) 146/80     Weight: 113.4 kg (250 lb)     Body mass index is 39.16 kg/m².        Ortho/SPM Exam  Vitals: Afebrile.  Vital signs stable.  General: No acute distress.  Cardio: Regular rate.  Chest: No increased work of breathing.    MSK:  LUE:   Skin intact  No deformity  No ecchymoses  Dorsoradial hand erythema and swelling with a few cm of fluctuance over carpus that is minimally TTP  No volar pain or  swelling  Able to range wrist from 30 extension to 30 flexion without pain  Pain with ROM beyond that  Compartments soft and compressible  SILT M/R/U  Motor intact AIN/PIN/U with weakness in all distributions likely 2/2 pain  Brisk cap refill  Warm well perfused extremities  Radial pulse palpable    Significant Labs:   CBC:   Recent Labs   Lab 05/09/19  1322   WBC 7.15   HGB 10.0*   HCT 31.2*        CMP:   Recent Labs   Lab 05/09/19  1322   *   K 4.5      CO2 23   *   BUN 21   CREATININE 1.3   CALCIUM 9.1   ANIONGAP 8   EGFRNONAA 39.9*     CRP:   Recent Labs   Lab 05/09/19  1322   CRP 98.4*     Joint aspirate: WBC 5505, 83% segs, crystals negative, GS negative, cx pending    Dorsal hand aspirate: GS negative, cx pending    All pertinent labs within the past 24 hours have been reviewed.    Significant Imaging: I have reviewed all pertinent imaging results/findings.     XR L hand/wrist negative for fx or dislocation.  MRI L hand/wrist showing fluid collection of fourth extensor compartment, no wrist effusion.    Procedure note:  After verbal consent was obtained, patient's L wrist was prepped with betadine and chlorhexidine.  An 18 gauge needle was used to aspirate the wrist joint using a dosoulnar approach.  Approximately 1 mL of bloody joint fluid was aspirated.  Fluid was sent to the lab for analysis.  Area was cleansed and dressed.  Patient tolerated procedure with no complications and minimal blood loss.    Procedure note:  After verbal consent was obtained, patient's L dorsal was prepped with betadine and chlorhexidine.  An 18 gauge needle was used to aspirate the fluid collection of the dorsal hand using a dorsal approach.  Approximately 1 mL of bloody fluid was aspirated.  Fluid was sent to the lab for analysis.  Area was cleansed and dressed.  Patient tolerated procedure with no complications and minimal blood loss.

## 2019-05-09 NOTE — HPI
74F with h/o CKD, IDDM (A1C 9.8), BMI 39 who presents with left hand pain and swelling. Patient reports left hand swelling with associated pain and redness with onset early this morning. Patient reports numerous episodes of left hand swelling over the past few years which attributes pain to a blown vein received during a missed IV stick a number of years ago. However, today's episode is worse than baseline. Pain is severe, rated 10/10. No recent falls or trauma. Patient reports moderate left hand weakness with onset this morning. Patient endorses difficulty ranging the digits on the left hand. Patient denies fever, chills, cp, SOB, n/v/d, extremity numbness, or extremity weakness.

## 2019-05-09 NOTE — TELEPHONE ENCOUNTER
----- Message from Joana Velasco sent at 5/9/2019 10:30 AM CDT -----  Contact: Mayda POOL/ [facility] / [phone]  Nurse states that she is calling to speak with someone in regards to the pt. She states that  the pt reported that she is on her way to the E.R in regards to her left wrist and hand being swollen and painful. She also states that the pt refused her wound care on today. Please advise.      Thanks

## 2019-05-09 NOTE — ED NOTES
Pt assisted to bathroom, to use bed pan. Pt repositioned in bed, denies any complaints at this time.

## 2019-05-09 NOTE — ED PROVIDER NOTES
Encounter Date: 5/9/2019    SCRIBE #1 NOTE: I, Mary Bello, am scribing for, and in the presence of,  Dr. Astorga. I have scribed the entire note.       History     Chief Complaint   Patient presents with    Hand Swelling     L hand swelling      Time seen by provider: 12:34 PM    74 y.o. female with medical conditions including NIDDM, HTN, HLD, ulcers of the lower extremities, who presents with complaint of hand swelling. Patient reports left hand swelling with associated pain and redness with onset early this morning. Patient reports numerous episodes of left hand swelling over the past few years which attributes pain to a blown vein received during a missed IV stick a number of years ago. However, today's episode is worse than baseline. Pain is severe, rated 10/10. No recent falls or trauma. Patient reports moderate left hand weakness with onset this morning. Patient endorses difficulty ranging the digits on the left hand. Patient denies fever, chills, cp, SOB, n/v/d, extremity numbness, or extremity weakness.       The history is provided by the patient.     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    Benign essential hypertension 11/14/2012    Cataract     s/p phacoemulsification    Chronic kidney disease (CKD), stage III (moderate) 9/12/2013    Diabetic peripheral neuropathy associated with type 2 diabetes mellitus 11/14/2014    causing right hemiparesis    Gait disorder     Hyperlipidemia     Iritis - Both Eyes 6/10/2013    Kidney stone     Lymphedema     Morbid obesity with BMI of 40.0-44.9, adult 2/18/2015    Nephrolithiasis 4/20/2016    NS (nuclear sclerosis)  4/1/2013    Nuclear sclerosis - Both Eyes 4/29/2013    Preseptal cellulitis - Right Eye 4/29/2013    Proliferative diabetic retinopathy - Both Eyes 4/29/2013    Proliferative diabetic retinopathy, both eyes 4/1/2013    PSC (posterior subcapsular cataract) - Both Eyes 4/29/2013    S/P hernia repair 12/19/2012    TIA (transient ischemic attack) 11/18/2014    Tinea pedis 7/24/2012    Tinea pedis is present on both feet.     Type 2 diabetes mellitus with renal manifestations, controlled 12/12/2013    Type 2 diabetes, controlled, with moderate nonproliferative diabetic retinopathy without macular edema 9/17/2015    Ulcer of left lower extremity, limited to breakdown of skin 7/8/2015    Unspecified cerebral artery occlusion with cerebral infarction 11/16/2014    Unspecified venous (peripheral) insufficiency     Ureteral stone with hydronephrosis 1/27/2016    UTI (lower urinary tract infection)     Vaginal infection     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     Past Surgical History:   Procedure Laterality Date    APPENDECTOMY      CATARACT EXTRACTION W/  INTRAOCULAR LENS IMPLANT Left 5/21/2013    CATARACT EXTRACTION W/  INTRAOCULAR LENS IMPLANT Right 6/4/2013    CHOLECYSTECTOMY      COLONOSCOPY  12/22/2005    normal    CYSTOSCOPY  4/20/2016    [facility] OR    CYSTOSCOPY WITH STENT PLACEMENT Right 1/27/2016    [facility] at [facility] OR    ESOPHAGOGASTRODUODENOSCOPY  12/21/2015    hiatal hernia, Schatzki ring    ESOPHAGOGASTRODUODENOSCOPY (EGD) N/A 6/30/2014    Performed by [facility] at [facility] ENDO (2ND FLR)    EYE SURGERY Bilateral 2008    laser surgery both eyes    INSERTION, IOL PROSTHESIS Right 6/4/2013    Performed by [facility] at [facility] OR 1ST FLR    INSERTION, IOL PROSTHESIS Left 5/21/2013    Performed by [facility] at [facility] OR 1ST FLR    NASAL SEPTUM SURGERY      PHACOEMULSIFICATION, CATARACT  Right 2013    [facility] at [facility] 1ST FLR    PHACOEMULSIFICATION, CATARACT Left 2013    [facility] at [facility] OR 1ST FLR    PYELOGRAM-RETROGRADE Right 2016    [facility]    REMOVAL-STENT-URETERAL Right 2016    [facility]    SUBTOTAL COLECTOMY  2012    transverse colon, for incarcerated umbilical hernia, Dr. Kat Trujillo-Katie    URETEROSCOPY Right 2016    [facility] OR     Family History   Problem Relation Age of Onset    Diabetes Sister     Cataracts Sister     Heart disease Brother     Cataracts Brother     Leukemia Mother     Cancer Neg Hx     Amblyopia Neg Hx     Blindness Neg Hx     Glaucoma Neg Hx     Hypertension Neg Hx     Macular degeneration Neg Hx     Retinal detachment Neg Hx     Strabismus Neg Hx     Stroke Neg Hx     Thyroid disease Neg Hx     Kidney disease Neg Hx      Social History     Tobacco Use    Smoking status: Former Smoker     Packs/day: 0.50     Years: 15.00     Pack years: 7.50     Types: Cigarettes     Last attempt to quit: 1982     Years since quittin.8    Smokeless tobacco: Former User    Tobacco comment: smoked one pack per week   Substance Use Topics    Alcohol use: No    Drug use: No     Review of Systems   Constitutional: Negative for chills and fever.   HENT: Negative for facial swelling and rhinorrhea.    Eyes: Negative for visual disturbance.   Respiratory: Negative for cough and shortness of breath.    Cardiovascular: Negative for chest pain.   Gastrointestinal: Negative for nausea and vomiting.   Genitourinary: Negative for dysuria and flank pain.   Musculoskeletal: Negative for back pain and gait problem.        Left hand swelling   Skin: Negative for rash.   Neurological: Negative for weakness and numbness.       Physical Exam     Initial Vitals [19  1155]   BP Pulse Resp Temp SpO2   (!) 146/80 80 16 98.6 °F (37 °C) 100 %      MAP       --         Physical Exam    Nursing note and vitals reviewed.  Constitutional: She appears well-developed and well-nourished. She is not diaphoretic. No distress.   HENT:   Head: Normocephalic and atraumatic.   Mouth/Throat: Oropharynx is clear and moist.   Neck: Normal range of motion. Neck supple. No JVD present.   Cardiovascular: Normal rate, regular rhythm, normal heart sounds and intact distal pulses.   Pulmonary/Chest: Breath sounds normal. No respiratory distress. She has no wheezes. She has no rhonchi. She has no rales.   Abdominal: Soft. Bowel sounds are normal. She exhibits no distension. There is no tenderness.   Musculoskeletal: Normal range of motion.   Edema and erythema to the dorsal aspect of the left hand. Limited ROM on left wrist secondary to pain. 2/2 cm area of fluctuance. No streaking of extremities. Mild erythema to the lateral aspect of the left wrist  BLE dressings intact   Lymphadenopathy:     She has no cervical adenopathy.   Neurological: She is alert and oriented to person, place, and time. She has normal strength. No cranial nerve deficit or sensory deficit.   Skin: Skin is warm and dry.         ED Course   Procedures  Labs Reviewed   CBC W/ AUTO DIFFERENTIAL - Abnormal; Notable for the following components:       Result Value    RBC 3.47 (*)     Hemoglobin 10.0 (*)     Hematocrit 31.2 (*)     MPV 9.0 (*)     Gran% 76.5 (*)     Lymph% 14.0 (*)     All other components within normal limits   BASIC METABOLIC PANEL - Abnormal; Notable for the following components:    Sodium 133 (*)     Glucose 333 (*)     eGFR if  46.0 (*)     eGFR if non  39.9 (*)     All other components within normal limits   SEDIMENTATION RATE - Abnormal; Notable for the following components:    Sed Rate 104 (*)     All other components within normal limits   C-REACTIVE PROTEIN - Abnormal; Notable for  the following components:    CRP 98.4 (*)     All other components within normal limits   GRAM STAIN   GRAM STAIN   CULTURE, FUNGUS   CULTURE, FUNGUS   PROCALCITONIN   BODY FLUID CRYSTAL   PROCALCITONIN   WBC & DIFF,BODY FLUID   POCT GLUCOSE MONITORING CONTINUOUS          Imaging Results          MRI Hand Wrist W WO Left_Rheumatoid (Final result)  Result time 05/09/19 16:34:15    Final result by Brandan Woods MD (05/09/19 16:34:15)                 Impression:      Fluid distention and synovial enhancement of the 4th compartment of the extensor tendons consistent with tenosynovitis.    Diffuse skin soft tissue edema and enhancement especially dorsally could indicate cellulitis.  No abscess.    Mild edema of the lunate without definite osteo necrotic signal changes.  This could represent stress response or inflammation.  There are probable erosions of the radial aspect of the lunate and the proximal aspect of the capitate.    Irregular striated signal of the triangular fibrocartilage could indicate complex nondisplaced tearing.    The distal radioulnar joint has moderate fluid and synovial reaction.  This could be sterile rheumatoid associated joint inflammation.      Electronically signed by: Brandan Woods  Date:    05/09/2019  Time:    16:34             Narrative:    EXAMINATION:  MRI HAND_WRIST W WO LEFT_RHEUMATOID ARTHRITIS    CLINICAL HISTORY:  Hand erythema, swelling, cellulitis suspected;Wrist erythema, swelling, cellulitis suspected;    TECHNIQUE:  Multiplanar pre and post contrast MR sequences performed.  10 cc Gadavist are injected intravenously.    COMPARISON:  Left hand films of 05/09/2019    FINDINGS:  Tendons: There is significant fluid distention extensor digitorum tendon sheath which also shows enhancement.  Findings indicate tenosynovitis of the 4th compartment.  The extensor tendons M ore overall normal and signal intensity and are sharply defined without definite enlargement.    There is  moderate fluid in the flexor digitorum profundus and superficialis tendons shows seen at the proximal extent of coverage of the exam.  The flexor tendons themselves show normal signal and are sharply defined.  Potentially these findings could indicate there tenosynovitis of the flexor compartment.    No tendon defect found.  The extensor carpi ulnaris appears intact with normal position.  The abductor pollicis longus and extensor pollicis brevis tendons appear normal.    Soft tissues: There is diffuse skin edema especially dorsally.  Some of the shows striated enhancement.  Findings could indicate cellulitis or other inflammatory process.  No abscess collection found.    Bones: No fracture or osteonecrosis.  There is mild diffuse edema of the lunate.  No definite osteonecrosis signal intensity or collapse is seen.  This edema pattern could simply be stress response.  This questionable erosion along the radial aspect of the lunate the ulnar styloid appears intact.  There appear to be erosions of the proximal aspect of the capitate.    Ligaments: The collateral ligaments of the MCPs appear normal.    Triangular fibrocartilage: There is diffuse striated increased T2 signal of the triangle fibrocartilage.  This could indicate degeneration or perhaps complex nondisplaced tearing.  The radial and ulnar attachments appear to be intact on this hand MR large field-of-view exam.  There is moderate fluid in the distal radioulnar joint with synovial enhancement.                               X-Ray Hand 3 view Left (Final result)  Result time 05/09/19 13:46:56    Final result by Sunday Sequeira III, MD (05/09/19 13:46:56)                 Impression:      See above      Electronically signed by: Sunday Sequeira MD  Date:    05/09/2019  Time:    13:46             Narrative:    EXAMINATION:  XR HAND COMPLETE 3 VIEW LEFT    CLINICAL HISTORY:  left hand swelling;    FINDINGS:  No fracture dislocation bone destruction seen.                                X-Ray Wrist Complete Left (Final result)  Result time 05/09/19 13:47:41    Final result by Sunday Sequeira III, MD (05/09/19 13:47:41)                 Impression:      See above      Electronically signed by: Sunday Sequeira MD  Date:    05/09/2019  Time:    13:47             Narrative:    EXAMINATION:  XR WRIST COMPLETE 3 VIEWS LEFT    CLINICAL HISTORY:  Pain in left wrist    FINDINGS:  There is trapezium resection.  There is scapholunate disassociation.  There is DJD.  No acute fracture dislocation bone destruction seen.                              X-Rays:   Independently Interpreted Readings:   Other Readings:  Left hand and wrist: no acute fracture or dislocation.    Medical Decision Making:   History:   Old Medical Records: I decided to obtain old medical records.  Initial Assessment:   Emergent evaluation of 74 y.o.female with non traumatic swelling, pain, and redness to the left hand and wrist. Limited ROM secondary to pain. Distal pulses intact.  Differential Diagnosis:   My differential diagnosis includes, but is not limited to: cellulitis, abscess, septic joint  Independently Interpreted Test(s):   I have ordered and independently interpreted X-rays - see prior notes.  I have ordered and independently interpreted EKG Reading(s) - see prior notes  Clinical Tests:   Lab Tests: Ordered and Reviewed  Radiological Study: Ordered and Reviewed  ED Management:  -x ray  -labs    1:25 PM  Bedside US shows possible multiloculated fluid collection over the wrist and proximal hand. Labs ordered. Will consult ortho.    2:00 PM  Ortho at bedside, they evaluated patient.  They are recommending MRI for abscess versus cellulitis.  Patient signed out to Dr. Cj Ramirez in stable condition pending ortho consult and final disposition.  I anticipate admission.   Other:   I have discussed this case with another health care provider.            Scribe Attestation:   Scribe #1: I performed the above scribed  service and the documentation accurately describes the services I performed. I attest to the accuracy of the note.               Clinical Impression:       ICD-10-CM ICD-9-CM   1. Cellulitis of hand, left L03.114 682.4   2. Left wrist pain M25.532 719.43   3. Cellulitis of left hand L03.114 682.4         Disposition:   Disposition: Placed in Observation  Condition: Fair                        Winsome Astorga MD  05/11/19 0728

## 2019-05-10 ENCOUNTER — ANESTHESIA EVENT (OUTPATIENT)
Dept: NEUROSURGERY | Facility: HOSPITAL | Age: 74
DRG: 638 | End: 2019-05-10
Payer: MEDICARE

## 2019-05-10 ENCOUNTER — ANESTHESIA (OUTPATIENT)
Dept: NEUROSURGERY | Facility: HOSPITAL | Age: 74
DRG: 638 | End: 2019-05-10
Payer: MEDICARE

## 2019-05-10 PROBLEM — L97.929 VENOUS STASIS ULCERS OF BOTH LOWER EXTREMITIES: Status: ACTIVE | Noted: 2019-05-10

## 2019-05-10 PROBLEM — L97.919 VENOUS STASIS ULCERS OF BOTH LOWER EXTREMITIES: Status: ACTIVE | Noted: 2019-05-10

## 2019-05-10 PROBLEM — L97.509 TOE ULCER: Status: ACTIVE | Noted: 2019-05-10

## 2019-05-10 PROBLEM — I83.029 VENOUS STASIS ULCERS OF BOTH LOWER EXTREMITIES: Status: ACTIVE | Noted: 2019-05-10

## 2019-05-10 PROBLEM — I83.019 VENOUS STASIS ULCERS OF BOTH LOWER EXTREMITIES: Status: ACTIVE | Noted: 2019-05-10

## 2019-05-10 PROBLEM — L30.8 DERMATITIS ASSOCIATED WITH MOISTURE: Status: ACTIVE | Noted: 2019-05-10

## 2019-05-10 LAB
ANION GAP SERPL CALC-SCNC: 8 MMOL/L (ref 8–16)
BACTERIA #/AREA URNS AUTO: ABNORMAL /HPF
BASOPHILS # BLD AUTO: 0.02 K/UL (ref 0–0.2)
BASOPHILS NFR BLD: 0.3 % (ref 0–1.9)
BILIRUB UR QL STRIP: NEGATIVE
BUN SERPL-MCNC: 18 MG/DL (ref 8–23)
CALCIUM SERPL-MCNC: 9 MG/DL (ref 8.7–10.5)
CHLORIDE SERPL-SCNC: 103 MMOL/L (ref 95–110)
CLARITY UR REFRACT.AUTO: ABNORMAL
CO2 SERPL-SCNC: 24 MMOL/L (ref 23–29)
COLOR UR AUTO: YELLOW
CREAT SERPL-MCNC: 1 MG/DL (ref 0.5–1.4)
DIFFERENTIAL METHOD: ABNORMAL
EOSINOPHIL # BLD AUTO: 0.1 K/UL (ref 0–0.5)
EOSINOPHIL NFR BLD: 1 % (ref 0–8)
ERYTHROCYTE [DISTWIDTH] IN BLOOD BY AUTOMATED COUNT: 14.3 % (ref 11.5–14.5)
EST. GFR  (AFRICAN AMERICAN): >60 ML/MIN/1.73 M^2
EST. GFR  (NON AFRICAN AMERICAN): 54.9 ML/MIN/1.73 M^2
GLUCOSE SERPL-MCNC: 162 MG/DL (ref 70–110)
GLUCOSE UR QL STRIP: ABNORMAL
HCT VFR BLD AUTO: 30.6 % (ref 37–48.5)
HGB BLD-MCNC: 9.7 G/DL (ref 12–16)
HGB UR QL STRIP: ABNORMAL
HYALINE CASTS UR QL AUTO: 0 /LPF
IMM GRANULOCYTES # BLD AUTO: 0.02 K/UL (ref 0–0.04)
IMM GRANULOCYTES NFR BLD AUTO: 0.3 % (ref 0–0.5)
KETONES UR QL STRIP: NEGATIVE
LEUKOCYTE ESTERASE UR QL STRIP: ABNORMAL
LYMPHOCYTES # BLD AUTO: 1 K/UL (ref 1–4.8)
LYMPHOCYTES NFR BLD: 18.1 % (ref 18–48)
MCH RBC QN AUTO: 28.6 PG (ref 27–31)
MCHC RBC AUTO-ENTMCNC: 31.7 G/DL (ref 32–36)
MCV RBC AUTO: 90 FL (ref 82–98)
MICROSCOPIC COMMENT: ABNORMAL
MONOCYTES # BLD AUTO: 0.7 K/UL (ref 0.3–1)
MONOCYTES NFR BLD: 11.3 % (ref 4–15)
NEUTROPHILS # BLD AUTO: 4 K/UL (ref 1.8–7.7)
NEUTROPHILS NFR BLD: 69 % (ref 38–73)
NITRITE UR QL STRIP: NEGATIVE
NRBC BLD-RTO: 0 /100 WBC
PATH INTERP FLD-IMP: NORMAL
PH UR STRIP: 6 [PH] (ref 5–8)
PLATELET # BLD AUTO: 288 K/UL (ref 150–350)
PMV BLD AUTO: 9 FL (ref 9.2–12.9)
POCT GLUCOSE: 119 MG/DL (ref 70–110)
POCT GLUCOSE: 161 MG/DL (ref 70–110)
POCT GLUCOSE: 179 MG/DL (ref 70–110)
POCT GLUCOSE: 239 MG/DL (ref 70–110)
POCT GLUCOSE: 278 MG/DL (ref 70–110)
POTASSIUM SERPL-SCNC: 3.9 MMOL/L (ref 3.5–5.1)
PROT UR QL STRIP: ABNORMAL
RBC # BLD AUTO: 3.39 M/UL (ref 4–5.4)
RBC #/AREA URNS AUTO: 19 /HPF (ref 0–4)
SODIUM SERPL-SCNC: 135 MMOL/L (ref 136–145)
SP GR UR STRIP: 1.01 (ref 1–1.03)
SQUAMOUS #/AREA URNS AUTO: 5 /HPF
URN SPEC COLLECT METH UR: ABNORMAL
WBC # BLD AUTO: 5.74 K/UL (ref 3.9–12.7)
WBC #/AREA URNS AUTO: >100 /HPF (ref 0–5)
WBC CLUMPS UR QL AUTO: ABNORMAL
YEAST UR QL AUTO: ABNORMAL

## 2019-05-10 PROCEDURE — 87077 CULTURE AEROBIC IDENTIFY: CPT | Mod: HCNC

## 2019-05-10 PROCEDURE — 25000003 PHARM REV CODE 250: Mod: HCNC | Performed by: NURSE PRACTITIONER

## 2019-05-10 PROCEDURE — 87076 CULTURE ANAEROBE IDENT EACH: CPT | Mod: HCNC

## 2019-05-10 PROCEDURE — G0378 HOSPITAL OBSERVATION PER HR: HCPCS | Mod: HCNC

## 2019-05-10 PROCEDURE — 87186 SC STD MICRODIL/AGAR DIL: CPT | Mod: HCNC

## 2019-05-10 PROCEDURE — 80048 BASIC METABOLIC PNL TOTAL CA: CPT | Mod: HCNC

## 2019-05-10 PROCEDURE — 36415 COLL VENOUS BLD VENIPUNCTURE: CPT | Mod: HCNC

## 2019-05-10 PROCEDURE — 99223 PR INITIAL HOSPITAL CARE,LEVL III: ICD-10-PCS | Mod: HCNC,,, | Performed by: ORTHOPAEDIC SURGERY

## 2019-05-10 PROCEDURE — 85025 COMPLETE CBC W/AUTO DIFF WBC: CPT | Mod: HCNC

## 2019-05-10 PROCEDURE — 20600001 HC STEP DOWN PRIVATE ROOM: Mod: HCNC

## 2019-05-10 PROCEDURE — 87086 URINE CULTURE/COLONY COUNT: CPT | Mod: HCNC

## 2019-05-10 PROCEDURE — 99226 PR SUBSEQUENT OBSERVATION CARE,LEVEL III: ICD-10-PCS | Mod: HCNC,,, | Performed by: NURSE PRACTITIONER

## 2019-05-10 PROCEDURE — 87147 CULTURE TYPE IMMUNOLOGIC: CPT | Mod: HCNC

## 2019-05-10 PROCEDURE — 99226 PR SUBSEQUENT OBSERVATION CARE,LEVEL III: CPT | Mod: HCNC,,, | Performed by: NURSE PRACTITIONER

## 2019-05-10 PROCEDURE — 63600175 PHARM REV CODE 636 W HCPCS: Mod: HCNC | Performed by: PHYSICIAN ASSISTANT

## 2019-05-10 PROCEDURE — 87075 CULTR BACTERIA EXCEPT BLOOD: CPT | Mod: HCNC

## 2019-05-10 PROCEDURE — 99223 1ST HOSP IP/OBS HIGH 75: CPT | Mod: HCNC,,, | Performed by: ORTHOPAEDIC SURGERY

## 2019-05-10 PROCEDURE — 81001 URINALYSIS AUTO W/SCOPE: CPT | Mod: HCNC

## 2019-05-10 PROCEDURE — S5571 INSULIN DISPOS PEN 3 ML: HCPCS | Mod: HCNC | Performed by: PHYSICIAN ASSISTANT

## 2019-05-10 PROCEDURE — 63600175 PHARM REV CODE 636 W HCPCS: Mod: HCNC | Performed by: NURSE PRACTITIONER

## 2019-05-10 PROCEDURE — 25000003 PHARM REV CODE 250: Mod: HCNC | Performed by: PHYSICIAN ASSISTANT

## 2019-05-10 PROCEDURE — 87070 CULTURE OTHR SPECIMN AEROBIC: CPT | Mod: HCNC

## 2019-05-10 RX ORDER — DOXYLAMINE SUCCINATE 25 MG
TABLET ORAL 2 TIMES DAILY
Status: DISCONTINUED | OUTPATIENT
Start: 2019-05-10 | End: 2019-05-10

## 2019-05-10 RX ORDER — LEVOFLOXACIN 750 MG/1
750 TABLET ORAL DAILY
Status: DISCONTINUED | OUTPATIENT
Start: 2019-05-10 | End: 2019-05-13

## 2019-05-10 RX ORDER — HYDRALAZINE HYDROCHLORIDE 10 MG/1
10 TABLET, FILM COATED ORAL EVERY 6 HOURS PRN
Status: DISCONTINUED | OUTPATIENT
Start: 2019-05-10 | End: 2019-05-14 | Stop reason: HOSPADM

## 2019-05-10 RX ORDER — VANCOMYCIN 2 GRAM/500 ML IN 0.9 % SODIUM CHLORIDE INTRAVENOUS
15
Status: DISCONTINUED | OUTPATIENT
Start: 2019-05-10 | End: 2019-05-13

## 2019-05-10 RX ORDER — PREDNISONE 20 MG/1
40 TABLET ORAL DAILY
Status: COMPLETED | OUTPATIENT
Start: 2019-05-10 | End: 2019-05-12

## 2019-05-10 RX ADMIN — ATORVASTATIN CALCIUM 40 MG: 20 TABLET, FILM COATED ORAL at 09:05

## 2019-05-10 RX ADMIN — PREDNISONE 40 MG: 20 TABLET ORAL at 01:05

## 2019-05-10 RX ADMIN — INSULIN ASPART 2 UNITS: 100 INJECTION, SOLUTION INTRAVENOUS; SUBCUTANEOUS at 08:05

## 2019-05-10 RX ADMIN — LEVOFLOXACIN 750 MG: 750 TABLET, FILM COATED ORAL at 01:05

## 2019-05-10 RX ADMIN — INSULIN DETEMIR 18 UNITS: 100 INJECTION, SOLUTION SUBCUTANEOUS at 09:05

## 2019-05-10 RX ADMIN — INSULIN ASPART 2 UNITS: 100 INJECTION, SOLUTION INTRAVENOUS; SUBCUTANEOUS at 05:05

## 2019-05-10 RX ADMIN — INSULIN ASPART 2 UNITS: 100 INJECTION, SOLUTION INTRAVENOUS; SUBCUTANEOUS at 09:05

## 2019-05-10 RX ADMIN — HYDROCODONE BITARTRATE AND ACETAMINOPHEN 1 TABLET: 10; 325 TABLET ORAL at 02:05

## 2019-05-10 RX ADMIN — ASPIRIN 81 MG CHEWABLE TABLET 81 MG: 81 TABLET CHEWABLE at 09:05

## 2019-05-10 RX ADMIN — VANCOMYCIN HYDROCHLORIDE 2000 MG: 100 INJECTION, POWDER, LYOPHILIZED, FOR SOLUTION INTRAVENOUS at 10:05

## 2019-05-10 NOTE — SUBJECTIVE & OBJECTIVE
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    Benign essential hypertension 11/14/2012    Cataract     s/p phacoemulsification    Chronic kidney disease (CKD), stage III (moderate) 9/12/2013    Diabetic peripheral neuropathy associated with type 2 diabetes mellitus 11/14/2014    causing right hemiparesis    Gait disorder     Hyperlipidemia     Iritis - Both Eyes 6/10/2013    Kidney stone     Lymphedema     Morbid obesity with BMI of 40.0-44.9, adult 2/18/2015    Nephrolithiasis 4/20/2016    NS (nuclear sclerosis) 4/1/2013    Nuclear sclerosis - Both Eyes 4/29/2013    Preseptal cellulitis - Right Eye 4/29/2013    Proliferative diabetic retinopathy - Both Eyes 4/29/2013    Proliferative diabetic retinopathy, both eyes 4/1/2013    PSC (posterior subcapsular cataract) - Both Eyes 4/29/2013    S/P hernia repair 12/19/2012    TIA (transient ischemic attack) 11/18/2014    Tinea pedis 7/24/2012    Tinea pedis is present on both feet.     Type 2 diabetes mellitus with renal manifestations, controlled 12/12/2013    Type 2 diabetes, controlled, with moderate nonproliferative diabetic retinopathy without macular edema 9/17/2015    Ulcer of left lower extremity, limited to breakdown of skin 7/8/2015    Unspecified cerebral artery occlusion with cerebral infarction 11/16/2014    Unspecified venous (peripheral) insufficiency     Ureteral stone with hydronephrosis 1/27/2016    UTI (lower urinary tract infection)     Vaginal infection     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       Past Surgical History:   Procedure Laterality Date    APPENDECTOMY      CATARACT EXTRACTION W/  INTRAOCULAR LENS IMPLANT Left 5/21/2013    CATARACT EXTRACTION W/  INTRAOCULAR LENS IMPLANT Right 6/4/2013    CHOLECYSTECTOMY      COLONOSCOPY  12/22/2005    normal    CYSTOSCOPY  4/20/2016    [facility]    CYSTOSCOPY WITH STENT  "PLACEMENT Right 1/27/2016    [facility]    ESOPHAGOGASTRODUODENOSCOPY  12/21/2015    hiatal hernia, Schatzki ring    ESOPHAGOGASTRODUODENOSCOPY (EGD) N/A 6/30/2014    Performed by Jaspreet Ng MD at [facility] ENDO (2ND FLR)    EYE SURGERY Bilateral 2008    laser surgery both eyes    INSERTION, IOL PROSTHESIS Right 6/4/2013    Performed by Federico Schwartz MD at [facility] OR 1ST FLR    INSERTION, IOL PROSTHESIS Left 5/21/2013    Performed by [facility]ico Schwartz [facility] at [facility] OR 1ST FLR    NASAL SEPTUM SURGERY      PHACOEMULSIFICATION, CATARACT Right 6/4/2013    Performed by Fed[facility]ico Schwartz [facility] at [facility] OR 1ST FLR    PHACOEMULSIFICATION, CATARACT Left 5/21/2013    Performed by Fed[facility]ico Schwartz [facility] at [facility] OR 1ST FLR    PYELOGRAM-RETROGRADE Right 4/20/2016    [facility] OR    REMOVAL-STENT-URETERAL Right 4/20/2016    [facility] at [facility] OR    SUBTOTAL COLECTOMY  12/13/2012    transverse colon, for incarcerated umbilical hernia, Dr. Kat Bower    URETEROSCOPY Right 4/20/2016    Performed by [facility] at [facility] OR       Review of patient's allergies indicates:   Allergen Reactions    Penicillins Hives     Other reaction(s): Hives    Sulfa (sulfonamide antibiotics) Other (See Comments)     Shakes, pt states her doctor told her the shakes were possibly caused by an allergy to sulfa       No current facility-administered medications on file prior to encounter.      Current Outpatient Medications on File Prior to Encounter   Medication Sig    ACCU-CHEK RAMIRO PLUS TEST STRP Strp TEST TWICE DAILY    ACCU-CHEK SOFTCLIX LANCETS Misc Test twice daily    aspirin 81 MG Chew Take 81 mg by mouth once daily.      atorvastatin (LIPITOR) 40 MG tablet TAKE 1 TABLET EVERY EVENING    BD INSULIN SYRINGE ULTRA-FINE 1/2 mL 30 gauge x 1/2" Syrg USE EVERY NIGHT    bumetanide (BUMEX) 1 MG tablet Take 2 " tablets (2 mg total) by mouth once daily.    clopidogrel (PLAVIX) 75 mg tablet TAKE 1 TABLET EVERY DAY    diclofenac sodium 1 % Gel Apply 2 g topically nightly as needed. (Patient taking differently: Apply 2 g topically nightly as needed (pain). )    LANTUS U-100 INSULIN 100 unit/mL injection INJECT 7 TO 10 UNITS SUBCUTANEOUSLY IN THE EVENING DEPENDING ON SCALE (DISCARD EACH VIAL AFTER 28 DAYS) (Patient taking differently: INJECT 25 UNITS SUBCUTANEOUSLY IN THE EVENING DEPENDING ON SCALE (DISCARD EACH VIAL AFTER 28 DAYS))    vancomycin HCl in 5 % dextrose (VANCOMYCIN IN 5 % DEXTROSE) 1.25 gram/250 mL Soln Inject 250 mLs (1,250 mg total) into the vein once daily.     Family History     Problem Relation (Age of Onset)    Cataracts Sister, Brother    Diabetes Sister    Heart disease Brother    Leukemia Mother        Tobacco Use    Smoking status: Former Smoker     Packs/day: 0.50     Years: 15.00     Pack years: 7.50     Types: Cigarettes     Last attempt to quit: 1982     Years since quittin.8    Smokeless tobacco: Former User    Tobacco comment: smoked one pack per week   Substance and Sexual Activity    Alcohol use: No    Drug use: No    Sexual activity: Not Currently     Review of Systems   Constitutional: Negative for activity change, appetite change, chills, diaphoresis, fatigue and fever.   HENT: Negative for congestion, ear pain, rhinorrhea, sore throat and trouble swallowing.    Eyes: Negative for photophobia, pain, redness and visual disturbance.   Respiratory: Negative for cough, shortness of breath and wheezing.    Cardiovascular: Negative for chest pain, palpitations and leg swelling.   Gastrointestinal: Negative for abdominal pain, constipation, diarrhea, nausea and vomiting.   Genitourinary: Negative for dysuria, flank pain, frequency, hematuria and urgency.   Musculoskeletal: Negative for arthralgias, gait problem, myalgias and neck pain.        L hand edema, erythema, and pain   Skin:  Negative for pallor, rash and wound.   Neurological: Negative for dizziness, syncope, weakness, light-headedness and headaches.   Psychiatric/Behavioral: Negative for confusion and decreased concentration. The patient is not nervous/anxious.      Objective:     Vital Signs (Most Recent):  Temp: 97.9 °F (36.6 °C) (05/09/19 1842)  Pulse: 68 (05/09/19 2200)  Resp: 16 (05/09/19 2200)  BP: (!) 161/73 (05/09/19 2200)  SpO2: 98 % (05/09/19 2200) Vital Signs (24h Range):  Temp:  [97.8 °F (36.6 °C)-98.6 °F (37 °C)] 97.9 °F (36.6 °C)  Pulse:  [67-80] 68  Resp:  [16-18] 16  SpO2:  [98 %-100 %] 98 %  BP: (144-161)/(64-80) 161/73     Weight: 113.4 kg (250 lb)  Body mass index is 39.16 kg/m².    Physical Exam   Constitutional: She is oriented to person, place, and time. She appears well-developed and well-nourished. No distress.   HENT:   Head: Normocephalic and atraumatic.   Right Ear: External ear normal.   Left Ear: External ear normal.   Mouth/Throat: Oropharynx is clear and moist.   Eyes: Conjunctivae and EOM are normal.   Neck: Normal range of motion. Neck supple. No JVD present. No tracheal deviation present.   Cardiovascular: Normal rate, regular rhythm, normal heart sounds and intact distal pulses.   Pulmonary/Chest: Effort normal and breath sounds normal. She has no wheezes.   Abdominal: Soft. Bowel sounds are normal. She exhibits no distension. There is no tenderness. There is no guarding.   Neurological: She is alert and oriented to person, place, and time.   Skin: Skin is warm and dry. She is not diaphoretic. There is erythema.   Bilateral feet and ankles with dressings and bandages in place. L forearm and hand splinted and elevated.    Psychiatric: She has a normal mood and affect. Her behavior is normal. Judgment and thought content normal.         CRANIAL NERVES     CN III, IV, VI   Extraocular motions are normal.        Significant Labs:   BMP:   Recent Labs   Lab 05/09/19  1322   *   *   K 4.5   CL  102   CO2 23   BUN 21   CREATININE 1.3   CALCIUM 9.1     CBC:   Recent Labs   Lab 05/09/19  1322   WBC 7.15   HGB 10.0*   HCT 31.2*        POCT Glucose: No results for input(s): POCTGLUCOSE in the last 48 hours.    Significant Imaging: I have reviewed all pertinent imaging results/findings within the past 24 hours.

## 2019-05-10 NOTE — PROGRESS NOTES
Redness noted to stomach/ashley folds. Excoriation noted to perineal area. BLE dressings noted; dressings not removed at this time. (per pt, she was supposed to have wound care 05/09/19). Skin tear noted to posterior LLE (inferior to buttocks)

## 2019-05-10 NOTE — ASSESSMENT & PLAN NOTE
- ESR/CRP elevated on admit, VSS, WBC 7.15  - MRI of L hand and wrist consistent with tenosynovitis and cellulitis   - ortho consulted, recs appreciated:   - wrist joint aspirated with analysis not concerning for septic arthritis   - dorsal fluctuance aspirated and urate crystals noted in the fluid; prednisone initiated    - admit for IV abx, elevation in splint   - NPO after midnight as precaution  - orders placed for prn pain meds  - 2 g Ceftriaxone IV and 1750 mg Vancomycin IV administered, will continue-deescalated the rocephin

## 2019-05-10 NOTE — SUBJECTIVE & OBJECTIVE
"Principal Problem:Cellulitis of left hand    Principal Orthopedic Problem: same    Interval History: Patient seen and examined at bedside.  No acute events overnight.  Pain controlled.  Hand splinted and hung overnight.      Review of patient's allergies indicates:   Allergen Reactions    Penicillins Hives     Other reaction(s): Hives    Sulfa (sulfonamide antibiotics) Other (See Comments)     Shakes, pt states her doctor told her the shakes were possibly caused by an allergy to sulfa       Current Facility-Administered Medications   Medication    acetaminophen tablet 650 mg    aspirin chewable tablet 81 mg    atorvastatin tablet 40 mg    clopidogrel tablet 75 mg    dextrose 50% injection 12.5 g    glucagon (human recombinant) injection 1 mg    HYDROcodone-acetaminophen  mg per tablet 1 tablet    HYDROcodone-acetaminophen 5-325 mg per tablet 1 tablet    insulin aspart U-100 pen 1-10 Units    insulin detemir U-100 pen 18 Units    ondansetron injection 8 mg    polyethylene glycol packet 17 g    promethazine suppository 25 mg    ramelteon tablet 8 mg    sodium chloride 0.9% flush 10 mL     Objective:     Vital Signs (Most Recent):  Temp: 98 °F (36.7 °C) (05/10/19 0220)  Pulse: 67 (05/10/19 0220)  Resp: 20 (05/10/19 0220)  BP: (!) 181/79 (05/10/19 0220)  SpO2: 99 % (05/10/19 0105) Vital Signs (24h Range):  Temp:  [97.8 °F (36.6 °C)-98.6 °F (37 °C)] 98 °F (36.7 °C)  Pulse:  [67-80] 67  Resp:  [16-20] 20  SpO2:  [97 %-100 %] 99 %  BP: (136-181)/(62-80) 181/79     Weight: 127 kg (279 lb 15.7 oz)  Height: 5' 7" (170.2 cm)  Body mass index is 43.85 kg/m².      Intake/Output Summary (Last 24 hours) at 5/10/2019 0655  Last data filed at 5/9/2019 2337  Gross per 24 hour   Intake 50 ml   Output --   Net 50 ml       Ortho/SPM Exam  AAOx4  NAD  Reg rate  No increased WOB  Mild decrease in dorsal swelling  Still with pain past 30 deg wrist flex/ext  No volar pain  SILT M/R/U  AIN/PIN/U with weakness  WWP " extremities  FCDs in place and functioning    Significant Labs:   CBC:   Recent Labs   Lab 05/09/19  1322 05/10/19  0321   WBC 7.15 5.74   HGB 10.0* 9.7*   HCT 31.2* 30.6*    288     CMP:   Recent Labs   Lab 05/09/19  1322 05/10/19  0321   * 135*   K 4.5 3.9    103   CO2 23 24   * 162*   BUN 21 18   CREATININE 1.3 1.0   CALCIUM 9.1 9.0   ANIONGAP 8 8   EGFRNONAA 39.9* 54.9*     CRP:   Recent Labs   Lab 05/09/19  1322   CRP 98.4*     All pertinent labs within the past 24 hours have been reviewed.    Significant Imaging: I have reviewed all pertinent imaging results/findings.

## 2019-05-10 NOTE — ASSESSMENT & PLAN NOTE
- ESR/CRP elevated on admit, VSS, WBC 7.15  - MRI of L hand and wrist consistent with tenosynovitis and cellulitis   - ortho consulted, recs appreciated:   - wrist joint aspirated with analysis not concerning for septic arthritis   - dorsal fluctuance aspirated and sent for culture, results pending   - admit for IV abx, elevation in splint   - NPO after midnight as precaution   - ortho will follow-up with patient in AM   - orders placed for prn pain meds  - 2 g Ceftriaxone IV and 1750 mg Vancomycin IV administered, will continue

## 2019-05-10 NOTE — ASSESSMENT & PLAN NOTE
-Ordered DISHA's and arterial US, if results are concerning recommend consult to vascular sx or Interventional Cardiology.

## 2019-05-10 NOTE — HPI
Sherin Ortiz is a 74 y.o. female who  has a past medical history of Allergy, Asteroid hyalosis - Left Eye, Benign essential hypertension, Cataract, Chronic kidney disease (CKD), stage III (moderate), Diabetic peripheral neuropathy associated with type 2 diabetes mellitus, Gait disorder, Hyperlipidemia, Iritis - Both Eyes, Kidney stone, Lymphedema, Morbid obesity with BMI of 40.0-44.9, adult, Nephrolithiasis, NS (nuclear sclerosis), Nuclear sclerosis - Both Eyes, Preseptal cellulitis - Right Eye, Proliferative diabetic retinopathy - Both Eyes, Proliferative diabetic retinopathy, both eyes, PSC (posterior subcapsular cataract) - Both Eyes, S/P hernia repair, TIA (transient ischemic attack), Tinea pedis, Type 2 diabetes mellitus with renal manifestations, controlled, Type 2 diabetes, controlled, with moderate nonproliferative diabetic retinopathy without macular edema, Ulcer of left lower extremity, limited to breakdown of skin, Unspecified cerebral artery occlusion with cerebral infarction, Unspecified venous (peripheral) insufficiency, Ureteral stone with hydronephrosis, UTI (lower urinary tract infection), Vaginal infection, and Vertical heterotropia - Both Eyes.    Patient Admited for hand swelling.  Consulted to Podiatry for LE wounds  Wound care on board for multiple LE wounds, being treated with hydrofera blue dressings.  Concerns for pus at right great toe.  Patient denies F/c/N/V

## 2019-05-10 NOTE — HPI
Ms. Ortiz is a 75 y/o F with a PMH of T2DM (A1c 9.8), HTN, HLD, and ulcers of the lower extremities (recently admitted to hospital 03/22/2019 for cellulitis of L heel ulcer, completed tx with Vancomycin 04/12/2019, followed by  wound care) who is being admitted to hospital medicine observation for L hand edema. Patient reports L hand edema with associated pain and erythema with onset early this morning. Patient reports numerous episodes of L hand edema over the past few years which the patient attributes to a blown vein received during a missed IV stick a number of years ago. However, today's episode is worse than baseline. Pain is severe, rated 10/10. No recent falls or trauma. Patient has been taking care of her bed-bound sister and having to push her sister frequently from side to side for adjustments and bed pan usage, and therefore her L hand has been weaker and more painful than baseline. Patient endorses difficulty ranging the digits on the left hand. Patient denies fever, chills, CP, SOB, N/V/D, extremity numbness, or extremity weakness.     Labs in ED notable for WBC 7.15, sed rate 104, CRP 98.4, procal 0.08, glucose 333. Vitals notable for /80 mmHg, Temp 98.6 F, pulse 80 bpm. MRI of hand and wrist: fluid distention and synovial enhancement of the 4th compartment of the extensor tendons consistent with tenosynovitis, diffuse skin soft tissue edema and enhancement especially dorsally could indicate cellulitis.  No abscess. Ortho consulted and wrist joint aspirated with analysis not concerning for septic arthritis.  Also aspirated dorsal fluctuance and sent for culture. This was bloody fluid, not purulent. Recommend admission to medicine for IV abx and elevation in splint.

## 2019-05-10 NOTE — ASSESSMENT & PLAN NOTE
- glucose in   - home medications: Glimepiride 1 mg 2 tablets daily at lunchtime, Lantus 35 units qhs  - hospital regimen: insulin detemir 18u qhs + mod dose SSI for NPO patients

## 2019-05-10 NOTE — ASSESSMENT & PLAN NOTE
- patient recently admitted to hospital 03/22/2019 for cellulitis of L heel ulcer, completed tx with Vancomycin on 04/12/2019  - followed by wound care and HH wound care  - will consult wound care; assistance appreciated  - wound care concern for pseudomonas-levoquin initiated

## 2019-05-10 NOTE — NURSING
Anarobe/aerobic culture collected per physician and labeled.  Requisitions printed out per writer, initialed x2 placed in biohazard bag with sample and place in lab carrier container on unit for pick-up.

## 2019-05-10 NOTE — ASSESSMENT & PLAN NOTE
- glucose in   - home medications: Glimepiride 1 mg 2 tablets daily at lunchtime, Lantus 35 units qhs  - hospital regimen: insulin detemir 18u qhs + mod dose SSI for NPO patients  - better controlled today

## 2019-05-10 NOTE — SUBJECTIVE & OBJECTIVE
Scheduled Meds:   aspirin  81 mg Oral Daily    atorvastatin  40 mg Oral QHS    insulin detemir U-100  18 Units Subcutaneous QHS    levoFLOXacin  750 mg Oral Daily    miconazole nitrate 2%   Topical (Top) BID    predniSONE  40 mg Oral Daily    vancomycin (VANCOCIN) IVPB  15 mg/kg Intravenous Q24H     Continuous Infusions:  PRN Meds:acetaminophen, dextrose 50%, glucagon (human recombinant), hydrALAZINE, HYDROcodone-acetaminophen, HYDROcodone-acetaminophen, insulin aspart U-100, ondansetron, polyethylene glycol, promethazine, ramelteon, sodium chloride 0.9%    Review of patient's allergies indicates:   Allergen Reactions    Penicillins Hives     Other reaction(s): Hives    Sulfa (sulfonamide antibiotics) Other (See Comments)     Eyad, pt states her doctor told her the shakes were possibly caused by an allergy to sulfa        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    Benign essential hypertension 11/14/2012    Cataract     s/p phacoemulsification    Chronic kidney disease (CKD), stage III (moderate) 9/12/2013    Diabetic peripheral neuropathy associated with type 2 diabetes mellitus 11/14/2014    causing right hemiparesis    Gait disorder     Hyperlipidemia     Iritis - Both Eyes 6/10/2013    Kidney stone     Lymphedema     Morbid obesity with BMI of 40.0-44.9, adult 2/18/2015    Nephrolithiasis 4/20/2016    NS (nuclear sclerosis) 4/1/2013    Nuclear sclerosis - Both Eyes 4/29/2013    Preseptal cellulitis - Right Eye 4/29/2013    Proliferative diabetic retinopathy - Both Eyes 4/29/2013    Proliferative diabetic retinopathy, both eyes 4/1/2013    PSC (posterior subcapsular cataract) - Both Eyes 4/29/2013    S/P hernia repair 12/19/2012    TIA (transient ischemic attack) 11/18/2014    Tinea pedis 7/24/2012    Tinea pedis is present on both feet.     Type 2 diabetes mellitus with renal manifestations, controlled 12/12/2013    Type 2  diabetes, controlled, with moderate nonproliferative diabetic retinopathy without macular edema 9/17/2015    Ulcer of left lower extremity, limited to breakdown of skin 7/8/2015    Unspecified cerebral artery occlusion with cerebral infarction 11/16/2014    Unspecified venous (peripheral) insufficiency     Ureteral stone with hydronephrosis 1/27/2016    UTI (lower urinary tract infection)     Vaginal infection     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     Past Surgical History:   Procedure Laterality Date    APPENDECTOMY      CATARACT EXTRACTION W/  INTRAOCULAR LENS IMPLANT Left 5/21/2013    CATARACT EXTRACTION W/  INTRAOCULAR LENS IMPLANT Right 6/4/2013    CHOLECYSTECTOMY      COLONOSCOPY  12/22/2005    normal    CYSTOSCOPY  4/20/2016    [facility]    CYSTOSCOPY WITH STENT PLACEMENT Right 1/27/2016    [facility]    ESOPHAGOGASTRODUODENOSCOPY  12/21/2015    hiatal hernia, Schatzki ring    ESOPHAGOGASTRODUODENOSCOPY (EGD) N/A 6/30/2014    Performed by Jaspreet Ng [facility] at [facility] (2ND FLR)    EYE SURGERY Bilateral 2008    laser surgery both eyes    INSERTION, IOL PROSTHESIS Right 6/4/2013    Performed by [facility]erico Schwartz [facility] at [facility] OR 1ST FLR    INSERTION, IOL PROSTHESIS Left 5/21/2013    [facility] at [facility] OR 1ST FLR    NASAL SEPTUM SURGERY      PHACOEMULSIFICATION, CATARACT Right 6/4/2013    Per[facility] by [facility] OR 1ST FLR    PHACOEMULSIFICATION, CATARACT Left 5/21/2013    Performed by Fed[facility] 1ST FLR    PYELOGRAM-RETROGRADE Right 4/20/2016    [facility]    REMOVAL-STENT-URETERAL Right 4/20/2016    [facility]    SUBTOTAL COLECTOMY  12/13/2012    transverse colon, for incarcerated umbilical hernia, Dr. Kat Bower    URETEROSCOPY Right 4/20/2016     [facility]       Family History     Problem Relation (Age of Onset)    Cataracts Sister, Brother    Diabetes Sister    Heart disease Brother    Leukemia Mother        Tobacco Use    Smoking status: Former Smoker     Packs/day: 0.50     Years: 15.00     Pack years: 7.50     Types: Cigarettes     Last attempt to quit: 1982     Years since quittin.8    Smokeless tobacco: Former User    Tobacco comment: smoked one pack per week   Substance and Sexual Activity    Alcohol use: No    Drug use: No    Sexual activity: Not Currently     Review of Systems   Constitutional: Negative for chills and fever.   Cardiovascular: Negative for leg swelling.   Gastrointestinal: Negative for nausea and vomiting.   Musculoskeletal: Negative for arthralgias and joint swelling.   Skin: Positive for wound. Negative for rash.   Psychiatric/Behavioral: Negative for agitation and confusion.     Objective:     Vital Signs (Most Recent):  Temp: 98.7 °F (37.1 °C) (05/10/19 1606)  Pulse: 72 (05/10/19 1606)  Resp: 18 (05/10/19 1606)  BP: (!) 180/85 (05/10/19 1606)  SpO2: 98 % (05/10/19 1606) Vital Signs (24h Range):  Temp:  [97.8 °F (36.6 °C)-98.7 °F (37.1 °C)] 98.7 °F (37.1 °C)  Pulse:  [67-73] 72  Resp:  [16-20] 18  SpO2:  [97 %-99 %] 98 %  BP: (136-181)/(62-85) 180/85     Weight: 127 kg (279 lb 15.7 oz)  Body mass index is 43.85 kg/m².    Foot Exam    General  Orientation: alert and oriented to person, place, and time   Affect: appropriate       Right Foot/Ankle     Inspection and Palpation  Ecchymosis: none  Tenderness: none   Swelling: none     Neurovascular  Dorsalis pedis: 1+  Posterior tibial: 1+  Saphenous nerve sensation: diminished  Tibial nerve sensation: diminished  Superficial peroneal nerve sensation: diminished  Deep peroneal nerve sensation: diminished  Sural nerve sensation: diminished      Left Foot/Ankle      Inspection and Palpation  Ecchymosis: none  Tenderness: none   Swelling:  none     Neurovascular  Dorsalis pedis: 1+  Posterior tibial: 1+  Saphenous nerve sensation: diminished  Tibial nerve sensation: diminished  Superficial peroneal nerve sensation: diminished  Deep peroneal nerve sensation: diminished  Sural nerve sensation: diminished            Laboratory:  A1C:   Recent Labs   Lab 03/22/19  1744   HGBA1C 9.8*     CBC:   Recent Labs   Lab 05/10/19  0321   WBC 5.74   RBC 3.39*   HGB 9.7*   HCT 30.6*      MCV 90   MCH 28.6   MCHC 31.7*     CMP:   Recent Labs   Lab 05/10/19  0321   *   CALCIUM 9.0   *   K 3.9   CO2 24      BUN 18   CREATININE 1.0     CRP:   Recent Labs   Lab 05/09/19  1322   CRP 98.4*     ESR:   Recent Labs   Lab 05/09/19  1322   SEDRATE 104*     Prealbumin: No results for input(s): PREALBUMIN in the last 48 hours.  Wound Cultures:   Recent Labs   Lab 11/21/18  1120 03/22/19  2200 03/23/19  1119 05/09/19 2003 05/09/19 2008   LABAERO PSEUDOMONAS AERUGINOSA  Moderate    KLEBSIELLA OXYTOCA  Few    PROTEUS MIRABILIS  Moderate   KLEBSIELLA OXYTOCA  Moderate    MORGANELLA MORGANII  Moderate    METHICILLIN RESISTANT STAPHYLOCOCCUS AUREUS  Many    PROTEUS MIRABILIS  Few    PSEUDOMONAS AERUGINOSA  Many    PROVIDENCIA STUARTII  Many  Skin lyndsey also present    PSEUDOMONAS AERUGINOSA  Many    KLEBSIELLA OXYTOCA  Moderate   ENTEROBACTER AEROGENES  Few    METHICILLIN RESISTANT STAPHYLOCOCCUS AUREUS  Many    ENTEROBACTER CLOACAE  Few  Skin lyndsey also present   No growth No growth     Microbiology Results (last 7 days)     Procedure Component Value Units Date/Time    Culture, Anaerobe [[id number]] Collected:  05/10/19 1503    Order Status:  Sent Specimen:  Wound from Toe, Right Foot Updated:  05/10/19 1716    Aerobic culture [[id number]] Collected:  05/10/19 1503    Order Status:  Sent Specimen:  Wound from Toe, Right Foot Updated:  05/10/19 1715    AFB Culture & Smear [[id number]] Collected:  05/09/19 2008    Order Status:  Completed Specimen:   Joint Fluid from Hand, Left Updated:  05/10/19 1335     AFB CULTURE STAIN No acid fast bacilli seen.    AFB Culture & Smear [[id number]] Collected:  05/09/19 2003    Order Status:  Completed Specimen:  Joint Fluid from Wrist, Left Updated:  05/10/19 1335     AFB CULTURE STAIN No acid fast bacilli seen.    Culture, Anaerobic [[id number]] Collected:  05/09/19 2008    Order Status:  Completed Specimen:  Joint Fluid from Hand, Left Updated:  05/10/19 0737     Anaerobic Culture Culture in progress    Culture, Anaerobic [[id number]] Collected:  05/09/19 2003    Order Status:  Completed Specimen:  Joint Fluid from Wrist, Left Updated:  05/10/19 0737     Anaerobic Culture Culture in progress    Aerobic culture [[id number]] Collected:  05/09/19 2003    Order Status:  Completed Specimen:  Joint Fluid from Wrist, Left Updated:  05/10/19 0731     Aerobic Bacterial Culture No growth    Aerobic culture [[id number]] Collected:  05/09/19 2008    Order Status:  Completed Specimen:  Joint Fluid from Hand, Left Updated:  05/10/19 0731     Aerobic Bacterial Culture No growth    Gram stain [[id number]] Collected:  05/09/19 2008    Order Status:  Completed Specimen:  Joint Fluid from Hand, Left Updated:  05/09/19 2111     Gram Stain Result Rare WBC's      No organisms seen    Gram stain [[id number]] Collected:  05/09/19 2003    Order Status:  Completed Specimen:  Joint Fluid from Wrist, Left Updated:  05/09/19 2109     Gram Stain Result Rare WBC's      No organisms seen    Fungus culture [[id number]] Collected:  05/09/19 2008    Order Status:  Sent Specimen:  Joint Fluid from Hand, Left Updated:  05/09/19 2028    Fungus culture [[id number]] Collected:  05/09/19 2003    Order Status:  Sent Specimen:  Joint Fluid from Wrist, Left Updated:  05/09/19 2026          Diagnostic Results:  X-ray: Erosive changes of the distal phalanx of the great toe with associated toe wound which are concerning for osteomyelitis.  MRI could be performed for further  evaluation.    MRI: ordered    DISHA: 3/24  Ankle-brachial indices unable to be obtained secondary to the positioning of bilateral lower extremity bandages.    Diffuse bilateral atherosclerosis with no hemodynamically significant stenosis demonstrated in the right or left lower extremity arterial system.    Clinical Findings:  Ulcer Location: right distal hallux  Measurements:1.0x1.0x0.2  Periwound: hyperkeratotic  Drainage: serous  Base: granular  Signs of infection: scant purulent discharge.     Multiple LE venous stasis ulcers that are fibrogranular, no signs of infection.

## 2019-05-10 NOTE — PROGRESS NOTES
Pharmacokinetic Initial Assessment: IV Vancomycin    Assessment/Plan:    Initiate maintenance dose of vancomycin 2000mg IV every 24 hours  Desired empiric serum trough concentration is 10 to 20 mcg/mL.  Draw vancomycin trough level 30 min prior to third dose on 05/11 at approximately 2230   Pharmacy will continue to follow and monitor vancomycin.      Please contact pharmacy at extension [phone] with any questions regarding this assessment.     Thank you for the consult,   Supa Harrison     Patient brief summary:  Sherin Ortiz is a 74 y.o. female initiated on antimicrobial therapy with IV Vancomycin for treatment of suspected skin & soft tissue    Drug Allergies:   Review of patient's allergies indicates:   Allergen Reactions    Penicillins Hives     Other reaction(s): Hives    Sulfa (sulfonamide antibiotics) Other (See Comments)     Eyad, pt states her doctor told her the shakes were possibly caused by an allergy to sulfa       Actual Body Weight:   127kg    Renal Function:   Estimated Creatinine Clearance: 66.3 mL/min (based on SCr of 1 mg/dL).,     Dialysis Method (if applicable):  N/A     CBC (last 72 hours):  Recent Labs   Lab Result Units 05/09/19  1322 05/10/19  0321   WBC K/uL 7.15 5.74   Hemoglobin g/dL 10.0* 9.7*   Hematocrit % 31.2* 30.6*   Platelets K/uL 310 288   Gran% % 76.5* 69.0   Lymph% % 14.0* 18.1   Mono% % 9.0 11.3   Eosinophil% % 0.1 1.0   Basophil% % 0.3 0.3   Differential Method  Automated Automated       Metabolic Panel (last 72 hours):  Recent Labs   Lab Result Units 05/09/19  1322 05/10/19  0321   Sodium mmol/L 133* 135*   Potassium mmol/L 4.5 3.9   Chloride mmol/L 102 103   CO2 mmol/L 23 24   Glucose mg/dL 333* 162*   BUN, Bld mg/dL 21 18   Creatinine mg/dL 1.3 1.0       Drug levels (last 3 results):  No results for input(s): VANCOMYCINRA, VANCOMYCINPE, VANCOMYCINTR in the last 72 hours.    Microbiologic Results:  Microbiology Results (last 7 days)       Procedure Component Value  Units Date/Time    Culture, Anaerobic [[id number]] Collected:  05/09/19 2008    Order Status:  Completed Specimen:  Joint Fluid from Hand, Left Updated:  05/10/19 0737     Anaerobic Culture Culture in progress    Culture, Anaerobic [[id number]] Collected:  05/09/19 2003    Order Status:  Completed Specimen:  Joint Fluid from Wrist, Left Updated:  05/10/19 0737     Anaerobic Culture Culture in progress    Aerobic culture [[id number]] Collected:  05/09/19 2003    Order Status:  Completed Specimen:  Joint Fluid from Wrist, Left Updated:  05/10/19 0731     Aerobic Bacterial Culture No growth    Aerobic culture [[id number]] Collected:  05/09/19 2008    Order Status:  Completed Specimen:  Joint Fluid from Hand, Left Updated:  05/10/19 0731     Aerobic Bacterial Culture No growth    Gram stain [[id number]] Collected:  05/09/19 2008    Order Status:  Completed Specimen:  Joint Fluid from Hand, Left Updated:  05/09/19 2111     Gram Stain Result Rare WBC's      No organisms seen    Gram stain [[id number]] Collected:  05/09/19 2003    Order Status:  Completed Specimen:  Joint Fluid from Wrist, Left Updated:  05/09/19 2109     Gram Stain Result Rare WBC's      No organisms seen    Fungus culture [[id number]] Collected:  05/09/19 2008    Order Status:  Sent Specimen:  Joint Fluid from Hand, Left Updated:  05/09/19 2028    AFB Culture & Smear [[id number]] Collected:  05/09/19 2008    Order Status:  Sent Specimen:  Joint Fluid from Hand, Left Updated:  05/09/19 2027    AFB Culture & Smear [[id number]] Collected:  05/09/19 2003    Order Status:  Sent Specimen:  Joint Fluid from Wrist, Left Updated:  05/09/19 2026    Fungus culture [[id number]] Collected:  05/09/19 2003    Order Status:  Sent Specimen:  Joint Fluid from Wrist, Left Updated:  05/09/19 2026

## 2019-05-10 NOTE — ASSESSMENT & PLAN NOTE
74F with L hand cellulitis.  Wrist joint aspirated with analysis not concerning for septic arthritis.  Also aspirated dorsal fluctuance and sent for culture.  This was bloody fluid, not purulent.  Recommend admission to medicine for IV abx and elevation in splint.  Will reevaluate in AM.  NPO after midnight as precaution.

## 2019-05-10 NOTE — CONSULTS
[facility]  Podiatry  Consult Note    Patient Name: Sherin Ortiz  MRN: [id number]  Admission Date: 5/9/2019  Hospital Length of Stay: 0 days  Attending Physician: JADA Alba MD  Primary Care Provider: Ame Vasquez MD     Inpatient consult to Podiatry  Consult performed by: Paulino Stone MD  Consult ordered by: Breann Singh NP        Subjective:     History of Present Illness:  Sherin Ortiz is a 74 y.o. female who  has a past medical history of Allergy, Asteroid hyalosis - Left Eye, Benign essential hypertension, Cataract, Chronic kidney disease (CKD), stage III (moderate), Diabetic peripheral neuropathy associated with type 2 diabetes mellitus, Gait disorder, Hyperlipidemia, Iritis - Both Eyes, Kidney stone, Lymphedema, Morbid obesity with BMI of 40.0-44.9, adult, Nephrolithiasis, NS (nuclear sclerosis), Nuclear sclerosis - Both Eyes, Preseptal cellulitis - Right Eye, Proliferative diabetic retinopathy - Both Eyes, Proliferative diabetic retinopathy, both eyes, PSC (posterior subcapsular cataract) - Both Eyes, S/P hernia repair, TIA (transient ischemic attack), Tinea pedis, Type 2 diabetes mellitus with renal manifestations, controlled, Type 2 diabetes, controlled, with moderate nonproliferative diabetic retinopathy without macular edema, Ulcer of left lower extremity, limited to breakdown of skin, Unspecified cerebral artery occlusion with cerebral infarction, Unspecified venous (peripheral) insufficiency, Ureteral stone with hydronephrosis, UTI (lower urinary tract infection), Vaginal infection, and Vertical heterotropia - Both Eyes.    Patient Admited for hand swelling.  Consulted to Podiatry for LE wounds  Wound care on board for multiple LE wounds, being treated with hydrofera blue dressings.  Concerns for pus at right great toe.  Patient denies F/c/N/V      Scheduled Meds:   aspirin  81 mg Oral Daily    atorvastatin  40 mg Oral QHS    insulin detemir U-100  18 Units  Subcutaneous QHS    levoFLOXacin  750 mg Oral Daily    miconazole nitrate 2%   Topical (Top) BID    predniSONE  40 mg Oral Daily    vancomycin (VANCOCIN) IVPB  15 mg/kg Intravenous Q24H     Continuous Infusions:  PRN Meds:acetaminophen, dextrose 50%, glucagon (human recombinant), hydrALAZINE, HYDROcodone-acetaminophen, HYDROcodone-acetaminophen, insulin aspart U-100, ondansetron, polyethylene glycol, promethazine, ramelteon, sodium chloride 0.9%    Review of patient's allergies indicates:   Allergen Reactions    Penicillins Hives     Other reaction(s): Hives    Sulfa (sulfonamide antibiotics) Other (See Comments)     Eyad, pt states her doctor told her the shakes were possibly caused by an allergy to sulfa        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    Benign essential hypertension 11/14/2012    Cataract     s/p phacoemulsification    Chronic kidney disease (CKD), stage III (moderate) 9/12/2013    Diabetic peripheral neuropathy associated with type 2 diabetes mellitus 11/14/2014    causing right hemiparesis    Gait disorder     Hyperlipidemia     Iritis - Both Eyes 6/10/2013    Kidney stone     Lymphedema     Morbid obesity with BMI of 40.0-44.9, adult 2/18/2015    Nephrolithiasis 4/20/2016    NS (nuclear sclerosis) 4/1/2013    Nuclear sclerosis - Both Eyes 4/29/2013    Preseptal cellulitis - Right Eye 4/29/2013    Proliferative diabetic retinopathy - Both Eyes 4/29/2013    Proliferative diabetic retinopathy, both eyes 4/1/2013    PSC (posterior subcapsular cataract) - Both Eyes 4/29/2013    S/P hernia repair 12/19/2012    TIA (transient ischemic attack) 11/18/2014    Tinea pedis 7/24/2012    Tinea pedis is present on both feet.     Type 2 diabetes mellitus with renal manifestations, controlled 12/12/2013    Type 2 diabetes, controlled, with moderate nonproliferative diabetic retinopathy without macular edema 9/17/2015    Ulcer  of left lower extremity, limited to breakdown of skin 7/8/2015    Unspecified cerebral artery occlusion with cerebral infarction 11/16/2014    Unspecified venous (peripheral) insufficiency     Ureteral stone with hydronephrosis 1/27/2016    UTI (lower urinary tract infection)     Vaginal infection     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     Past Surgical History:   Procedure Laterality Date    APPENDECTOMY      CATARACT EXTRACTION W/  INTRAOCULAR LENS IMPLANT Left 5/21/2013    CATARACT EXTRACTION W/  INTRAOCULAR LENS IMPLANT Right 6/4/2013    CHOLECYSTECTOMY      COLONOSCOPY  12/22/2005    normal    CYSTOSCOPY  4/20/2016    Performed by [facility] at [facility] OR    CYSTOSCOPY WITH STENT PLACEMENT Right 1/27/2016    [facility] by [facility] at [facility] OR    ESOPHAGOGASTRODUODENOSCOPY  12/21/2015    hiatal hernia, Schatzki ring    ESOPHAGOGASTRODUODENOSCOPY (EGD) N/A 6/30/2014    Performed by [facility] at [facility] ENDO (2ND FLR)    EYE SURGERY Bilateral 2008    laser surgery both eyes    INSERTION, IOL PROSTHESIS Right 6/4/2013    Performed by [facility] Schwartz [facility] at [facility] OR 1ST FLR    INSERTION, IOL PROSTHESIS Left 5/21/2013    Per[facility] by [facility] at [facility] OR 1ST FLR    NASAL SEPTUM SURGERY      PHACOEMULSIFICATION, CATARACT Right 6/4/2013    Performed by [facility] at [facility] OR 1ST FLR    PHACOEMULSIFICATION, CATARACT Left 5/21/2013    Performed by [facility] at [facility] OR 1ST FLR    PYELOGRAM-RETROGRADE Right 4/20/2016    Per[facility] by [facility] at [facility] OR    REMOVAL-STENT-URETERAL Right 4/20/2016    Performed by Oliver [facility] at [facility] OR    SUBTOTAL COLECTOMY  12/13/2012    transverse colon, for incarcerated umbilical hernia, Dr. Kat Bower    URETEROSCOPY Right 4/20/2016    Performed by Oliver [facility] at [facility] OR       Family History     Problem Relation (Age of Onset)    Cataracts  Sister, Brother    Diabetes Sister    Heart disease Brother    Leukemia Mother        Tobacco Use    Smoking status: Former Smoker     Packs/day: 0.50     Years: 15.00     Pack years: 7.50     Types: Cigarettes     Last attempt to quit: 1982     Years since quittin.8    Smokeless tobacco: Former User    Tobacco comment: smoked one pack per week   Substance and Sexual Activity    Alcohol use: No    Drug use: No    Sexual activity: Not Currently     Review of Systems   Constitutional: Negative for chills and fever.   Cardiovascular: Negative for leg swelling.   Gastrointestinal: Negative for nausea and vomiting.   Musculoskeletal: Negative for arthralgias and joint swelling.   Skin: Positive for wound. Negative for rash.   Psychiatric/Behavioral: Negative for agitation and confusion.     Objective:     Vital Signs (Most Recent):  Temp: 98.7 °F (37.1 °C) (05/10/19 1606)  Pulse: 72 (05/10/19 1606)  Resp: 18 (05/10/19 1606)  BP: (!) 180/85 (05/10/19 1606)  SpO2: 98 % (05/10/19 1606) Vital Signs (24h Range):  Temp:  [97.8 °F (36.6 °C)-98.7 °F (37.1 °C)] 98.7 °F (37.1 °C)  Pulse:  [67-73] 72  Resp:  [16-20] 18  SpO2:  [97 %-99 %] 98 %  BP: (136-181)/(62-85) 180/85     Weight: 127 kg (279 lb 15.7 oz)  Body mass index is 43.85 kg/m².    Foot Exam    General  Orientation: alert and oriented to person, place, and time   Affect: appropriate       Right Foot/Ankle     Inspection and Palpation  Ecchymosis: none  Tenderness: none   Swelling: none     Neurovascular  Dorsalis pedis: 1+  Posterior tibial: 1+  Saphenous nerve sensation: diminished  Tibial nerve sensation: diminished  Superficial peroneal nerve sensation: diminished  Deep peroneal nerve sensation: diminished  Sural nerve sensation: diminished      Left Foot/Ankle      Inspection and Palpation  Ecchymosis: none  Tenderness: none   Swelling: none     Neurovascular  Dorsalis pedis: 1+  Posterior tibial: 1+  Saphenous nerve sensation: diminished  Tibial  nerve sensation: diminished  Superficial peroneal nerve sensation: diminished  Deep peroneal nerve sensation: diminished  Sural nerve sensation: diminished            Laboratory:  A1C:   Recent Labs   Lab 03/22/19  1744   HGBA1C 9.8*     CBC:   Recent Labs   Lab 05/10/19  0321   WBC 5.74   RBC 3.39*   HGB 9.7*   HCT 30.6*      MCV 90   MCH 28.6   MCHC 31.7*     CMP:   Recent Labs   Lab 05/10/19  0321   *   CALCIUM 9.0   *   K 3.9   CO2 24      BUN 18   CREATININE 1.0     CRP:   Recent Labs   Lab 05/09/19  1322   CRP 98.4*     ESR:   Recent Labs   Lab 05/09/19  1322   SEDRATE 104*     Prealbumin: No results for input(s): PREALBUMIN in the last 48 hours.  Wound Cultures:   Recent Labs   Lab 11/21/18  1120 03/22/19  2200 03/23/19  1119 05/09/19 2003 05/09/19 2008   LABAERO PSEUDOMONAS AERUGINOSA  Moderate    KLEBSIELLA OXYTOCA  Few    PROTEUS MIRABILIS  Moderate   KLEBSIELLA OXYTOCA  Moderate    MORGANELLA MORGANII  Moderate    METHICILLIN RESISTANT STAPHYLOCOCCUS AUREUS  Many    PROTEUS MIRABILIS  Few    PSEUDOMONAS AERUGINOSA  Many    PROVIDENCIA STUARTII  Many  Skin lyndsey also present    PSEUDOMONAS AERUGINOSA  Many    KLEBSIELLA OXYTOCA  Moderate   ENTEROBACTER AEROGENES  Few    METHICILLIN RESISTANT STAPHYLOCOCCUS AUREUS  Many    ENTEROBACTER CLOACAE  Few  Skin lyndsey also present   No growth No growth     Microbiology Results (last 7 days)     Procedure Component Value Units Date/Time    Culture, Anaerobe [[id number]] Collected:  05/10/19 1503    Order Status:  Sent Specimen:  Wound from Toe, Right Foot Updated:  05/10/19 1716    Aerobic culture [[id number]] Collected:  05/10/19 1503    Order Status:  Sent Specimen:  Wound from Toe, Right Foot Updated:  05/10/19 1715    AFB Culture & Smear [[id number]] Collected:  05/09/19 2008    Order Status:  Completed Specimen:  Joint Fluid from Hand, Left Updated:  05/10/19 1335     AFB CULTURE STAIN No acid fast bacilli seen.    AFB  Culture & Smear [[id number]] Collected:  05/09/19 2003    Order Status:  Completed Specimen:  Joint Fluid from Wrist, Left Updated:  05/10/19 1335     AFB CULTURE STAIN No acid fast bacilli seen.    Culture, Anaerobic [[id number]] Collected:  05/09/19 2008    Order Status:  Completed Specimen:  Joint Fluid from Hand, Left Updated:  05/10/19 0737     Anaerobic Culture Culture in progress    Culture, Anaerobic [[id number]] Collected:  05/09/19 2003    Order Status:  Completed Specimen:  Joint Fluid from Wrist, Left Updated:  05/10/19 0737     Anaerobic Culture Culture in progress    Aerobic culture [[id number]] Collected:  05/09/19 2003    Order Status:  Completed Specimen:  Joint Fluid from Wrist, Left Updated:  05/10/19 0731     Aerobic Bacterial Culture No growth    Aerobic culture [[id number]] Collected:  05/09/19 2008    Order Status:  Completed Specimen:  Joint Fluid from Hand, Left Updated:  05/10/19 0731     Aerobic Bacterial Culture No growth    Gram stain [[id number]] Collected:  05/09/19 2008    Order Status:  Completed Specimen:  Joint Fluid from Hand, Left Updated:  05/09/19 2111     Gram Stain Result Rare WBC's      No organisms seen    Gram stain [[id number]] Collected:  05/09/19 2003    Order Status:  Completed Specimen:  Joint Fluid from Wrist, Left Updated:  05/09/19 2109     Gram Stain Result Rare WBC's      No organisms seen    Fungus culture [[id number]] Collected:  05/09/19 2008    Order Status:  Sent Specimen:  Joint Fluid from Hand, Left Updated:  05/09/19 2028    Fungus culture [[id number]] Collected:  05/09/19 2003    Order Status:  Sent Specimen:  Joint Fluid from Wrist, Left Updated:  05/09/19 2026          Diagnostic Results:  X-ray: Erosive changes of the distal phalanx of the great toe with associated toe wound which are concerning for osteomyelitis.  MRI could be performed for further evaluation.    MRI: ordered    DISHA: 3/24  Ankle-brachial indices unable to be obtained secondary to the  positioning of bilateral lower extremity bandages.    Diffuse bilateral atherosclerosis with no hemodynamically significant stenosis demonstrated in the right or left lower extremity arterial system.    Clinical Findings:  Ulcer Location: right distal hallux  Measurements:1.0x1.0x0.2  Periwound: hyperkeratotic  Drainage: serous  Base: granular  Signs of infection: scant purulent discharge.     Multiple LE venous stasis ulcers that are fibrogranular, no signs of infection.                            Assessment/Plan:     * Cellulitis of left hand  Per ortho    Toe ulcer  Sherin Ortiz is a 74 y.o. female with toe ulcer, scant purulent discharge  -x-ray showing possible osteo, ordered MRI  -cultured toe.  -continue IV abx  -IF MRI positive for osteo, recommend consult to ID.  Dressed foot with betadine wet to dry today,  Nursing to do hydrofera MW, orders in.  -Wound care on board managing LE wounds with hydrofera, agree with hydrofera, recommend continue local wound care.  -Podiatry will follow    PAOD (peripheral arterial occlusive disease)  -Ordered DISHA's and arterial US, if results are concerning recommend consult to vascular sx or Interventional Cardiology.      Uncontrolled type 2 diabetes mellitus with stage 3 chronic kidney disease, with long-term current use of insulin  A1c 9.8  Per primary          Thank you for your consult. I will follow-up with patient. Please contact us if you have any additional questions.    Paulino Kuo MD  Podiatry  [facility]

## 2019-05-10 NOTE — ASSESSMENT & PLAN NOTE
74F with L hand cellulitis.  Wrist joint aspirated with analysis not concerning for septic arthritis.  Also aspirated dorsal fluctuance and sent for culture.  This was bloody fluid, not purulent.  Recommend admission to medicine for IV abx and elevation in splint.  Keep NPO.  Hold Plavix for now.  Will discuss further with staff this AM.

## 2019-05-10 NOTE — SUBJECTIVE & OBJECTIVE
Interval History: Patient resting in bed during assessment. No acute complaints. Patient updated on plan for OR today. Patient with understanding.     Review of Systems   Constitutional: Negative for chills, diaphoresis, fatigue and fever.   Respiratory: Negative for cough, shortness of breath and wheezing.    Cardiovascular: Negative for chest pain, palpitations and leg swelling.   Gastrointestinal: Negative for abdominal pain, constipation, diarrhea, nausea and vomiting.   Genitourinary: Negative for dysuria, flank pain, frequency, hematuria and urgency.   Musculoskeletal: Negative for arthralgias, gait problem, myalgias and neck pain.        L hand edema, erythema, and pain   Skin: Negative for pallor, rash and wound.   Neurological: Negative for dizziness, syncope, weakness, light-headedness and headaches.   Psychiatric/Behavioral: Negative for confusion and decreased concentration. The patient is not nervous/anxious.      Objective:     Vital Signs (Most Recent):  Temp: 97.8 °F (36.6 °C) (05/10/19 0733)  Pulse: 70 (05/10/19 0733)  Resp: 18 (05/10/19 0733)  BP: (!) 149/66 (05/10/19 0733)  SpO2: 97 % (05/10/19 0733) Vital Signs (24h Range):  Temp:  [97.8 °F (36.6 °C)-98.6 °F (37 °C)] 97.8 °F (36.6 °C)  Pulse:  [67-80] 70  Resp:  [16-20] 18  SpO2:  [97 %-100 %] 97 %  BP: (136-181)/(62-80) 149/66     Weight: 127 kg (279 lb 15.7 oz)  Body mass index is 43.85 kg/m².    Intake/Output Summary (Last 24 hours) at 5/10/2019 1122  Last data filed at 5/9/2019 2337  Gross per 24 hour   Intake 50 ml   Output --   Net 50 ml      Physical Exam   Constitutional: She is oriented to person, place, and time. She appears well-developed and well-nourished. No distress.   HENT:   Head: Normocephalic and atraumatic.   Right Ear: External ear normal.   Left Ear: External ear normal.   Mouth/Throat: Oropharynx is clear and moist.   Eyes: Conjunctivae and EOM are normal.   Neck: Normal range of motion. Neck supple. No JVD present. No  tracheal deviation present.   Cardiovascular: Normal rate, regular rhythm, normal heart sounds and intact distal pulses.   Pulmonary/Chest: Effort normal and breath sounds normal. She has no wheezes.   Abdominal: Soft. Bowel sounds are normal. She exhibits no distension. There is no tenderness. There is no guarding.   Neurological: She is alert and oriented to person, place, and time.   Skin: Skin is warm and dry. She is not diaphoretic. There is erythema.   Bilateral feet and ankles with dressings and bandages in place. L forearm and hand splinted and elevated.     Patient noted to have erythema in skin folds of abdomen and groin    Psychiatric: She has a normal mood and affect. Her behavior is normal. Judgment and thought content normal.       Significant Labs:   CBC:   Recent Labs   Lab 05/09/19  1322 05/10/19  0321   WBC 7.15 5.74   HGB 10.0* 9.7*   HCT 31.2* 30.6*    288     CMP:   Recent Labs   Lab 05/09/19  1322 05/10/19  0321   * 135*   K 4.5 3.9    103   CO2 23 24   * 162*   BUN 21 18   CREATININE 1.3 1.0   CALCIUM 9.1 9.0   ANIONGAP 8 8   EGFRNONAA 39.9* 54.9*       Significant Imaging: I have reviewed all pertinent imaging results/findings within the past 24 hours.

## 2019-05-10 NOTE — PROGRESS NOTES
Wound care consult for BLE.     PMH:T2DM (A1c 9.8), HTN, HLD, chronic venous stasis    Patient followed by home health and outpatient wound care. Compared to photographs from wound care center in mid April the wounds appear larger with more slough tiisue noted. Very strong odor from legs when entering the room. Dressings were saturated with creamy greenish drainage. Wound to the right great toe with purulence noted. All wounds cleansed and covered with hydrofera blue, abd and kerlex.   Patient going to surgery today for her hand.     Discussed with Breann GREER. Discussed odor and purulent drainage. They will consult podiatry to further assess foot wound.     Recommend:  Cleanse wounds with 1/4 dakins solution, cover with hydrofera blue, abd and kerlex    Wound care to follow PRN.   Janie Sorensen RN [facility]   [phone]       05/10/19 1011        Wound 03/26/19 0810 Venous Ulcer anterior Leg   Date First Assessed/Time First Assessed: 03/26/19 0810   Pre-existing: Yes  Primary Wound Type: Venous Ulcer  Side: Left  Orientation: anterior  Location: Leg   Wound Image    Wound WDL ex   Dressing Appearance Moist drainage   Drainage Amount Moderate   Drainage Characteristics/Odor Purulent;Malodorous   Appearance Fibrin;Slough   Tissue loss description Full thickness   Yellow (%), Wound Tissue Color 100 %   Periwound Area Hemosiderin Staining   Wound Edges Open   Wound Length (cm) 4.5 cm   Wound Width (cm) 3 cm   Wound Depth (cm) 0.1 cm   Wound Volume (cm^3) 1.35 cm^3   Wound Surface Area (cm^2) 13.5 cm^2   Care Cleansed with:;Wound cleanser   Dressing Removed;Applied;Changed;Methylene blue/gentian violet   Dressing Change Due 05/12/19        Wound 05/10/19 0800 Venous Ulcer posterior Calf   Date First Assessed/Time First Assessed: 05/10/19 0800   Pre-existing: Yes  Primary Wound Type: Venous Ulcer  Side: Left  Orientation: posterior  Location: Calf   Wound Image    Wound WDL ex   Dressing Appearance Saturated   Drainage  Amount Large   Drainage Characteristics/Odor Purulent;Malodorous   Appearance Fibrin;Slough   Tissue loss description Full thickness   Yellow (%), Wound Tissue Color 100 %   Periwound Area Redness;Hemosiderin Staining   Wound Edges Open   Wound Length (cm) 6 cm   Wound Width (cm) 5 cm   Wound Depth (cm) 0.1 cm   Wound Volume (cm^3) 3 cm^3   Wound Surface Area (cm^2) 30 cm^2   Care Cleansed with:   Dressing Removed;Applied;Changed;Methylene blue/gentian violet;Abd pad;Rolled gauze   Dressing Change Due 05/12/19        Wound 05/10/19 0800 Venous Ulcer lower Leg   Date First Assessed/Time First Assessed: 05/10/19 0800   Pre-existing: Yes  Primary Wound Type: Venous Ulcer  Side: Right  Orientation: lower  Location: Leg   Wound Image    Wound WDL ex   Dressing Appearance Saturated   Drainage Amount Moderate   Drainage Characteristics/Odor Creamy;Malodorous   Appearance Granulating;Slough   Tissue loss description Full thickness   Red (%), Wound Tissue Color 50 %   Yellow (%), Wound Tissue Color 50 %   Periwound Area Redness;Hemosiderin Staining   Wound Edges Open   Wound Length (cm) 9 cm   Wound Width (cm) 3 cm   Wound Depth (cm) 0.1 cm   Wound Volume (cm^3) 2.7 cm^3   Wound Surface Area (cm^2) 27 cm^2   Care Sterile normal saline;Cleansed with:   Dressing Removed;Applied;Changed;Methylene blue/gentian violet;Abd pad;Rolled gauze   Dressing Change Due 05/12/19        Wound 05/10/19 0800 Diabetic Ulcer Toe, first   Date First Assessed/Time First Assessed: 05/10/19 0800   Pre-existing: Yes  Primary Wound Type: Diabetic Ulcer  Side: Right  Location: Toe, first   Wound Image    Wound WDL ex   Dressing Appearance Moist drainage   Drainage Amount Moderate   Drainage Characteristics/Odor Purulent   Appearance Yellow   Tissue loss description Full thickness   Yellow (%), Wound Tissue Color 100 %   Periwound Area Redness   Wound Edges Open   Wound Length (cm) 1.5 cm   Wound Width (cm) 1.6 cm   Wound Depth (cm) 0.1 cm   Wound  Volume (cm^3) 0.24 cm^3   Wound Surface Area (cm^2) 2.4 cm^2   Care Cleansed with:;Sterile normal saline   Dressing Removed;Applied;Changed;Methylene blue/gentian violet;Rolled gauze   Dressing Change Due 05/12/19        Pressure Injury 05/10/19 0800 Right Heel Unstageable   Date First Assessed/Time First Assessed: 05/10/19 0800   Pressure Injury Present on Admission: yes  Side: Right  Location: Heel  Staging: Unstageable   Wound Image    Staging Unstageable   Dressing Appearance Moist drainage;Saturated   Drainage Amount Moderate   Drainage Characteristics/Odor Creamy;Malodorous   Appearance Granulating;Necrotic   Tissue loss description Full thickness   Black (%), Wound Tissue Color 10 %   Red (%), Wound Tissue Color 50 %   Yellow (%), Wound Tissue Color 40 %   Periwound Area Macerated   Wound Edges Rolled/closed   Wound Length (cm) 3.4 cm   Wound Width (cm) 5 cm   Wound Depth (cm) 0.5 cm   Wound Volume (cm^3) 8.5 cm^3   Wound Surface Area (cm^2) 17 cm^2   Care Cleansed with:;Sterile normal saline   Dressing Removed;Applied;Changed;Methylene blue/gentian violet;Abd pad;Rolled gauze   Dressing Change Due 05/12/19        Pressure Injury 05/10/19 0800 Left dorsal Foot Unstageable   Date First Assessed/Time First Assessed: 05/10/19 0800   Pressure Injury Present on Admission: yes  Side: Left  Orientation: dorsal  Location: Foot  Staging: Unstageable   Wound Image    Staging Unstageable   Dressing Appearance Saturated   Drainage Amount Moderate   Drainage Characteristics/Odor Serosanguineous   Appearance Slough;Granulating   Tissue loss description Full thickness   Red (%), Wound Tissue Color 50 %   Yellow (%), Wound Tissue Color 50 %   Periwound Area [place]   Wound Edges Open   Wound Length (cm) 1 cm   Wound Width (cm) 1 cm   Wound Depth (cm) 0.1 cm   Wound Volume (cm^3) 0.1 cm^3   Wound Surface Area (cm^2) 1 cm^2   Care Cleansed with:;Sterile normal saline   Dressing Removed;Applied;Changed;Methylene blue/gentian  violet;Abd pad;Rolled gauze   Dressing Change Due 05/12/19        Pressure Injury 05/10/19 0800 Left Heel Unstageable   Date First Assessed/Time First Assessed: 05/10/19 0800   Pressure Injury Present on Admission: yes  Side: Left  Location: Heel  Staging: Unstageable   Wound Image    Staging Unstageable   Drainage Amount None   Black (%), Wound Tissue Color 100 %  (scab)   Wound Length (cm) 1 cm   Wound Width (cm) 1 cm   Wound Depth (cm) 0.1 cm   Wound Volume (cm^3) 0.1 cm^3   Wound Surface Area (cm^2) 1 cm^2

## 2019-05-10 NOTE — PROGRESS NOTES
[facility] Hospital Medicine  Progress Note    Patient Name: Sherin Ortiz  MRN: [id number]  Patient Class: OP- Observation   Admission Date: 5/9/2019  Length of Stay: 0 days  Attending Physician: JADA Alba MD  Primary Care Provider: Ame Vasquez MD    [facility] Medicine Team: [facility] F Breann Singh NP    Subjective:     Principal Problem:Cellulitis of left hand    HPI:  Ms. Ortiz is a 73 y/o F with a PMH of T2DM (A1c 9.8), HTN, HLD, and ulcers of the lower extremities (recently admitted to hospital 03/22/2019 for cellulitis of L heel ulcer, completed tx with Vancomycin 04/12/2019, followed by  wound care) who is being admitted to hospital medicine observation for L hand edema. Patient reports L hand edema with associated pain and erythema with onset early this morning. Patient reports numerous episodes of L hand edema over the past few years which the patient attributes to a blown vein received during a missed IV stick a number of years ago. However, today's episode is worse than baseline. Pain is severe, rated 10/10. No recent falls or trauma. Patient has been taking care of her bed-bound sister and having to push her sister frequently from side to side for adjustments and bed pan usage, and therefore her L hand has been weaker and more painful than baseline. Patient endorses difficulty ranging the digits on the left hand. Patient denies fever, chills, CP, SOB, N/V/D, extremity numbness, or extremity weakness.     Labs in ED notable for WBC 7.15, sed rate 104, CRP 98.4, procal 0.08, glucose 333. Vitals notable for /80 mmHg, Temp 98.6 F, pulse 80 bpm. MRI of hand and wrist: fluid distention and synovial enhancement of the 4th compartment of the extensor tendons consistent with tenosynovitis, diffuse skin soft tissue edema and enhancement especially dorsally could indicate cellulitis.  No abscess. Ortho consulted and wrist joint aspirated with analysis not concerning  for septic arthritis.  Also aspirated dorsal fluctuance and sent for culture. This was bloody fluid, not purulent. Recommend admission to medicine for IV abx and elevation in splint.     Hospital Course:  Patient admitted to hospital medicine for left hand cellulitis. Patient to go to OR today for I&D per ortho.     Interval History: Patient resting in bed during assessment. No acute complaints. Patient updated on plan for OR today. Patient with understanding.     Review of Systems   Constitutional: Negative for chills, diaphoresis, fatigue and fever.   Respiratory: Negative for cough, shortness of breath and wheezing.    Cardiovascular: Negative for chest pain, palpitations and leg swelling.   Gastrointestinal: Negative for abdominal pain, constipation, diarrhea, nausea and vomiting.   Genitourinary: Negative for dysuria, flank pain, frequency, hematuria and urgency.   Musculoskeletal: Negative for arthralgias, gait problem, myalgias and neck pain.        L hand edema, erythema, and pain   Skin: Negative for pallor, rash and wound.   Neurological: Negative for dizziness, syncope, weakness, light-headedness and headaches.   Psychiatric/Behavioral: Negative for confusion and decreased concentration. The patient is not nervous/anxious.      Objective:     Vital Signs (Most Recent):  Temp: 97.8 °F (36.6 °C) (05/10/19 0733)  Pulse: 70 (05/10/19 0733)  Resp: 18 (05/10/19 0733)  BP: (!) 149/66 (05/10/19 0733)  SpO2: 97 % (05/10/19 0733) Vital Signs (24h Range):  Temp:  [97.8 °F (36.6 °C)-98.6 °F (37 °C)] 97.8 °F (36.6 °C)  Pulse:  [67-80] 70  Resp:  [16-20] 18  SpO2:  [97 %-100 %] 97 %  BP: (136-181)/(62-80) 149/66     Weight: 127 kg (279 lb 15.7 oz)  Body mass index is 43.85 kg/m².    Intake/Output Summary (Last 24 hours) at 5/10/2019 1122  Last data filed at 5/9/2019 2337  Gross per 24 hour   Intake 50 ml   Output --   Net 50 ml      Physical Exam   Constitutional: She is oriented to person, place, and time. She appears  well-developed and well-nourished. No distress.   HENT:   Head: Normocephalic and atraumatic.   Right Ear: External ear normal.   Left Ear: External ear normal.   Mouth/Throat: Oropharynx is clear and moist.   Eyes: Conjunctivae and EOM are normal.   Neck: Normal range of motion. Neck supple. No JVD present. No tracheal deviation present.   Cardiovascular: Normal rate, regular rhythm, normal heart sounds and intact distal pulses.   Pulmonary/Chest: Effort normal and breath sounds normal. She has no wheezes.   Abdominal: Soft. Bowel sounds are normal. She exhibits no distension. There is no tenderness. There is no guarding.   Neurological: She is alert and oriented to person, place, and time.   Skin: Skin is warm and dry. She is not diaphoretic. There is erythema.   Bilateral feet and ankles with dressings and bandages in place. L forearm and hand splinted and elevated.     Patient noted to have erythema in skin folds of abdomen and groin    Psychiatric: She has a normal mood and affect. Her behavior is normal. Judgment and thought content normal.       Significant Labs:   CBC:   Recent Labs   Lab 05/09/19  1322 05/10/19  0321   WBC 7.15 5.74   HGB 10.0* 9.7*   HCT 31.2* 30.6*    288     CMP:   Recent Labs   Lab 05/09/19  1322 05/10/19  0321   * 135*   K 4.5 3.9    103   CO2 23 24   * 162*   BUN 21 18   CREATININE 1.3 1.0   CALCIUM 9.1 9.0   ANIONGAP 8 8   EGFRNONAA 39.9* 54.9*       Significant Imaging: I have reviewed all pertinent imaging results/findings within the past 24 hours.    Assessment/Plan:      * Cellulitis of left hand  - ESR/CRP elevated on admit, VSS, WBC 7.15  - MRI of L hand and wrist consistent with tenosynovitis and cellulitis   - ortho consulted, recs appreciated:   - wrist joint aspirated with analysis not concerning for septic arthritis   - dorsal fluctuance aspirated and urate crystals noted in the fluid; prednisone initiated    - admit for IV abx, elevation in  splint   - NPO after midnight as precaution  - orders placed for prn pain meds  - 2 g Ceftriaxone IV and 1750 mg Vancomycin IV administered, will continue-deescalated the rocephin     Uncontrolled type 2 diabetes mellitus with stage 3 chronic kidney disease, with long-term current use of insulin  - glucose in   - home medications: Glimepiride 1 mg 2 tablets daily at lunchtime, Lantus 35 units qhs  - hospital regimen: insulin detemir 18u qhs + mod dose SSI for NPO patients  - better controlled today    Hyperlipidemia LDL goal <100  - continue Lipitor 40 mg qd    PAOD (peripheral arterial occlusive disease)  - continue Plavix 75 mg qd, ASA 81 mg qd    Anemia of chronic disease  - Hgb on admit 10.0, patient's baseline 9.5-10.5  - monitor with daily CBC    Ulcer of heel and midfoot  - patient recently admitted to hospital 03/22/2019 for cellulitis of L heel ulcer, completed tx with Vancomycin on 04/12/2019  - followed by wound care and  wound care  - will consult wound care; assistance appreciated  - wound care concern for pseudomonas-levoquin initiated       VTE Risk Mitigation (From admission, onward)        Ordered     Place sequential compression device  Until discontinued      05/09/19 2339     IP VTE HIGH RISK PATIENT  Once      05/09/19 2339     Place AUTUMN hose  Until discontinued      05/09/19 2339              Breann Singh NP  Department of Hospital Medicine   [facility]

## 2019-05-10 NOTE — ASSESSMENT & PLAN NOTE
Sherin Ortiz is a 74 y.o. female with toe ulcer, scant purulent discharge  -x-ray showing possible osteo, ordered MRI  -cultured toe.  -continue IV abx  -IF MRI positive for osteo, recommend consult to ID.  Dressed foot with betadine wet to dry today,  Nursing to do hydrofera MWF, orders in.  -Wound care on board managing LE wounds with hydrofera, agree with hydrofera, recommend continue local wound care.  -Podiatry will follow

## 2019-05-10 NOTE — HOSPITAL COURSE
Patient admitted to hospital medicine for left hand cellulitis. Patient found to have gout. Started on prednisone and colchicine with improvement. Patient seen by podiatry for right toe wound; MRI ordered and acute osteomyelitis. ID consulted. Wound cultures with Streptococcus group c and pan sensitive proteus mirabilis. Patient denied amputation of right toe. ID recommended cefazolin 2g IV q8h X 6 weeks. PICC line placed. Patient discharged with HH with PT/OT/ nurse to administer IV antibiotics and send weekly CBC, CMP, CRP to ID clinic. Patient to follow up with PCP in 1 week and ID in 3-4 weeks. Patient verbalized understanding. All questions answered.

## 2019-05-10 NOTE — ASSESSMENT & PLAN NOTE
- patient recently admitted to hospital 03/22/2019 for cellulitis of L heel ulcer, completed tx with Vancomycin on 04/12/2019  - followed by wound care and  wound care  - will consult wound care

## 2019-05-10 NOTE — CONSULTS
[facility]  Orthopedics  Consult Note    Patient Name: Sherin Ortiz  MRN: [id number]  Admission Date: 5/9/2019  Hospital Length of Stay: 0 days  Attending Provider: Winsome Astorga MD  Primary Care Provider: Ame Vasquez MD    Patient information was obtained from patient, past medical records and ER records.     Inpatient consult to Orthopedic Surgery  Consult performed by: Ilia Smith MD  Consult ordered by: Winsome Astorga MD        Subjective:       Chief Complaint:   Chief Complaint   Patient presents with    Hand Swelling     L hand swelling         HPI: 74F with h/o CKD, IDDM (A1C 9.8), BMI 39 who presents with left hand pain and swelling. Patient reports left hand swelling with associated pain and redness with onset early this morning. Patient reports numerous episodes of left hand swelling over the past few years which attributes pain to a blown vein received during a missed IV stick a number of years ago. However, today's episode is worse than baseline. Pain is severe, rated 10/10. No recent falls or trauma. Patient reports moderate left hand weakness with onset this morning. Patient endorses difficulty ranging the digits on the left hand. Patient denies fever, chills, cp, SOB, n/v/d, extremity numbness, or extremity weakness.           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    Benign essential hypertension 11/14/2012    Cataract     s/p phacoemulsification    Chronic kidney disease (CKD), stage III (moderate) 9/12/2013    Diabetic peripheral neuropathy associated with type 2 diabetes mellitus 11/14/2014    causing right hemiparesis    Gait disorder     Hyperlipidemia     Iritis - Both Eyes 6/10/2013    Kidney stone     Lymphedema     Morbid obesity with BMI of 40.0-44.9, adult 2/18/2015    Nephrolithiasis 4/20/2016    NS (nuclear sclerosis) 4/1/2013    Nuclear sclerosis - Both Eyes 4/29/2013    Preseptal  cellulitis - Right Eye 4/29/2013    Proliferative diabetic retinopathy - Both Eyes 4/29/2013    Proliferative diabetic retinopathy, both eyes 4/1/2013    PSC (posterior subcapsular cataract) - Both Eyes 4/29/2013    S/P hernia repair 12/19/2012    TIA (transient ischemic attack) 11/18/2014    Tinea pedis 7/24/2012    Tinea pedis is present on both feet.    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       Past Surgical History:   Procedure Laterality Date    APPENDECTOMY      CATARACT EXTRACTION W/  INTRAOCULAR LENS IMPLANT Left 5/21/2013    CATARACT EXTRACTION W/  INTRAOCULAR LENS IMPLANT Right 6/4/2013    CHOLECYSTECTOMY      COLONOSCOPY  12/22/2005    normal    CYSTOSCOPY  4/20/2016    Performed by [facility] at [facility] OR    CYSTOSCOPY WITH STENT PLACEMENT Right 1/27/2016    [facility] at [facility]    ESOPHAGOGASTRODUODENOSCOPY  12/21/2015    hiatal hernia, Schatzki ring    ESOPHAGOGASTRODUODENOSCOPY (EGD) N/A 6/30/2014    Performed by Jaspreet Ng [facility] at [facility] (2ND FLR)    EYE SURGERY Bilateral 2008    laser surgery both eyes    INSERTION, IOL PROSTHESIS Right 6/4/2013    Performed by [facility]ico Schwartz [facility] at [facility] OR 1ST FLR    INSERTION, IOL PROSTHESIS Left 5/21/2013    Performed by [facility] at [facility] OR 1ST FLR    NASAL SEPTUM SURGERY      PHACOEMULSIFICATION, CATARACT Right 6/4/2013    Performed by [facility] at [facility]  "OR 1ST FLR    PHACOEMULSIFICATION, CATARACT Left 5/21/2013    Performed by [facility] at [facility] OR 1ST FLR    PYELOGRAM-RETROGRADE Right 4/20/2016    [facility] OR    REMOVAL-STENT-URETERAL Right 4/20/2016    [facility] by [facility] at [facility] OR    SUBTOTAL COLECTOMY  12/13/2012    transverse colon, for incarcerated umbilical hernia, Dr. Kat Trujillo-Katie    URETEROSCOPY Right 4/20/2016    Per[facility] by [facility] at [facility] OR       Review of patient's allergies indicates:   Allergen Reactions    Penicillins Hives     Other reaction(s): Hives    Sulfa (sulfonamide antibiotics) Other (See Comments)     Shakes, pt states her doctor told her the shakes were possibly caused by an allergy to sulfa       Current Facility-Administered Medications   Medication    traMADol tablet 50 mg     Current Outpatient Medications   Medication Sig    ACCU-CHEK RAMIRO PLUS TEST STRP Strp TEST TWICE DAILY    ACCU-CHEK SOFTCLIX LANCETS Misc Test twice daily    aspirin 81 MG Chew Take 81 mg by mouth once daily.      atorvastatin (LIPITOR) 40 MG tablet TAKE 1 TABLET EVERY EVENING    BD INSULIN SYRINGE ULTRA-FINE 1/2 mL 30 gauge x 1/2" Syrg USE EVERY NIGHT    bumetanide (BUMEX) 1 MG tablet Take 2 tablets (2 mg total) by mouth once daily.    clopidogrel (PLAVIX) 75 mg tablet TAKE 1 TABLET EVERY DAY    diclofenac sodium 1 % Gel Apply 2 g topically nightly as needed. (Patient taking differently: Apply 2 g topically nightly as needed (pain). )    LANTUS U-100 INSULIN 100 unit/mL injection INJECT 7 TO 10 UNITS SUBCUTANEOUSLY IN THE EVENING DEPENDING ON SCALE (DISCARD EACH VIAL AFTER 28 DAYS) (Patient taking differently: INJECT 25 UNITS SUBCUTANEOUSLY IN THE EVENING DEPENDING ON SCALE (DISCARD EACH VIAL AFTER 28 DAYS))    vancomycin HCl in 5 % dextrose (VANCOMYCIN IN 5 % DEXTROSE) 1.25 gram/250 mL Soln Inject 250 mLs (1,250 mg total) into the vein once daily. "     Family History     Problem Relation (Age of Onset)    Cataracts Sister, Brother    Diabetes Sister    Heart disease Brother    Leukemia Mother        Tobacco Use    Smoking status: Former Smoker     Packs/day: 0.50     Years: 15.00     Pack years: 7.50     Types: Cigarettes     Last attempt to quit: 1982     Years since quittin.8    Smokeless tobacco: Former User    Tobacco comment: smoked one pack per week   Substance and Sexual Activity    Alcohol use: No    Drug use: No    Sexual activity: Not Currently     ROS   Per ED ROS    Objective:     Vital Signs (Most Recent):  Temp: 98.6 °F (37 °C) (19 1155)  Pulse: 80 (19 1155)  Resp: 16 (19 1155)  BP: (!) 146/80 (19 1155)  SpO2: 100 % (19 1155) Vital Signs (24h Range):  Temp:  [98.6 °F (37 °C)] 98.6 °F (37 °C)  Pulse:  [80] 80  Resp:  [16] 16  SpO2:  [100 %] 100 %  BP: (146)/(80) 146/80     Weight: 113.4 kg (250 lb)     Body mass index is 39.16 kg/m².        Ortho/SPM Exam  Vitals: Afebrile.  Vital signs stable.  General: No acute distress.  Cardio: Regular rate.  Chest: No increased work of breathing.    MSK:  LUE:   Skin intact  No deformity  No ecchymoses  Dorsoradial hand erythema and swelling with a few cm of fluctuance over carpus that is minimally TTP  No volar pain or swelling  Able to range wrist from 30 extension to 30 flexion without pain  Pain with ROM beyond that  Compartments soft and compressible  SILT M/R/U  Motor intact AIN/PIN/U with weakness in all distributions likely 2/2 pain  Brisk cap refill  Warm well perfused extremities  Radial pulse palpable    Significant Labs:   CBC:   Recent Labs   Lab 19  1322   WBC 7.15   HGB 10.0*   HCT 31.2*        CMP:   Recent Labs   Lab 19  1322   *   K 4.5      CO2 23   *   BUN 21   CREATININE 1.3   CALCIUM 9.1   ANIONGAP 8   EGFRNONAA 39.9*     CRP:   Recent Labs   Lab 19  1322   CRP 98.4*     Joint aspirate: WBC 5505, 83%  segs, crystals negative, GS negative, cx pending    Dorsal hand aspirate: GS negative, cx pending    All pertinent labs within the past 24 hours have been reviewed.    Significant Imaging: I have reviewed all pertinent imaging results/findings.     XR L hand/wrist negative for fx or dislocation.  MRI L hand/wrist showing fluid collection of fourth extensor compartment, no wrist effusion.    Procedure note:  After verbal consent was obtained, patient's L wrist was prepped with betadine and chlorhexidine.  An 18 gauge needle was used to aspirate the wrist joint using a dosoulnar approach.  Approximately 1 mL of bloody joint fluid was aspirated.  Fluid was sent to the lab for analysis.  Area was cleansed and dressed.  Patient tolerated procedure with no complications and minimal blood loss.    Procedure note:  After verbal consent was obtained, patient's L dorsal was prepped with betadine and chlorhexidine.  An 18 gauge needle was used to aspirate the fluid collection of the dorsal hand using a dorsal approach.  Approximately 1 mL of bloody fluid was aspirated.  Fluid was sent to the lab for analysis.  Area was cleansed and dressed.  Patient tolerated procedure with no complications and minimal blood loss.        Assessment/Plan:     Cellulitis of left hand  74F with L hand cellulitis.  Wrist joint aspirated with analysis not concerning for septic arthritis.  Also aspirated dorsal fluctuance and sent for culture.  This was bloody fluid, not purulent.  Recommend admission to medicine for IV abx and elevation in splint.  Will reevaluate in AM.  NPO after midnight as precaution.        Thank you for your consult.     Ilia Smith MD  Orthopedics  [facility]

## 2019-05-10 NOTE — ANESTHESIA PREPROCEDURE EVALUATION
05/10/2019  Sherin Ortiz is a 74 y.o., female.  Pre-operative evaluation for Procedure(s) (LRB):  INCISION AND DRAINAGE, HAND - dorsal wrist - possible arthrotomy - hand pan - hand table on stretcher - hand drapes - cysto tubing - cultures - 4x15in splint materials (Left)    Sherin Ortiz is a 74 y.o. female with HTN, HLD, DM2, CKD3 , PAD presents with left hand cellulitis.    LDA:   20G R Forearm PIV    Prev airway:   Easy mask   Alvarado 2  Grade I    Drips:   None  Patient Active Problem List   Diagnosis    Ulcer of heel and midfoot    Bilateral leg edema    Tinea pedis    Hyperlipidemia LDL goal <100    Essential hypertension    PSC (posterior subcapsular cataract) - Both Eyes    Nuclear sclerosis - Both Eyes    Asteroid hyalosis - Left Eye    Stage 3 chronic kidney disease    Dystrophic nail    Hyponatremia    Weakness    Inability to walk    Morbid obesity with BMI of 40.0-44.9, adult    Diabetic peripheral neuropathy associated with type 2 diabetes mellitus    Anemia of chronic disease    Hypoalbuminemia    Wheelchair dependent    Ulcer of toe    Venous insufficiency of leg    Dermatitis, stasis    Uncontrolled type 2 diabetes mellitus with stage 3 chronic kidney disease, with long-term current use of insulin    Hyperparathyroidism    Aortic atherosclerosis    PAOD (peripheral arterial occlusive disease)    Right shoulder strain, initial encounter    Hx of transient ischemic attack (TIA)    (HFpEF) heart failure with preserved ejection fraction    Ulcer of lower extremity, limited to breakdown of skin    Ulcer of lower limb, left, with fat layer exposed    Skin ulcer of toe of right foot with fat layer exposed    Ulcer of toe of left foot, limited to breakdown of skin    Ulcer of right heel and midfoot with fat layer exposed    Ulcer of left heel and midfoot,  "limited to breakdown of skin    Cellulitis    Acute renal failure superimposed on stage 3 chronic kidney disease    Leukocytosis    Hyperglycemia    Prolonged Q-T interval on ECG    Hyperbilirubinemia    Goals of care, counseling/discussion    Ulcer of toe, right, with fat layer exposed    Cellulitis of left hand    Left wrist pain       Review of patient's allergies indicates:   Allergen Reactions    Penicillins Hives     Other reaction(s): Hives    Sulfa (sulfonamide antibiotics) Other (See Comments)     Shakes, pt states her doctor told her the shakes were possibly caused by an allergy to sulfa        No current facility-administered medications on file prior to encounter.      Current Outpatient Medications on File Prior to Encounter   Medication Sig Dispense Refill    ACCU-CHEK RAMIRO PLUS TEST STRP Strp TEST TWICE DAILY 200 strip 3    ACCU-CHEK SOFTCLIX LANCETS Misc Test twice daily      aspirin 81 MG Chew Take 81 mg by mouth once daily.        atorvastatin (LIPITOR) 40 MG tablet TAKE 1 TABLET EVERY EVENING 90 tablet 3    BD INSULIN SYRINGE ULTRA-FINE 1/2 mL 30 gauge x 1/2" Syrg USE EVERY NIGHT 90 each 3    clopidogrel (PLAVIX) 75 mg tablet TAKE 1 TABLET EVERY DAY 90 tablet 3    LANTUS U-100 INSULIN 100 unit/mL injection INJECT 7 TO 10 UNITS SUBCUTANEOUSLY IN THE EVENING DEPENDING ON SCALE (DISCARD EACH VIAL AFTER 28 DAYS) (Patient taking differently: INJECT 25 UNITS SUBCUTANEOUSLY IN THE EVENING DEPENDING ON SCALE (DISCARD EACH VIAL AFTER 28 DAYS)) 30 mL 3       Past Surgical History:   Procedure Laterality Date    APPENDECTOMY      CATARACT EXTRACTION W/  INTRAOCULAR LENS IMPLANT Left 5/21/2013    CATARACT EXTRACTION W/  INTRAOCULAR LENS IMPLANT Right 6/4/2013    CHOLECYSTECTOMY      COLONOSCOPY  12/22/2005    normal    CYSTOSCOPY  4/20/2016    [facility]    CYSTOSCOPY WITH STENT PLACEMENT Right 1/27/2016    [facility] at " [facility] OR    ESOPHAGOGASTRODUODENOSCOPY  2015    hiatal hernia, Schatzki ring    ESOPHAGOGASTRODUODENOSCOPY (EGD) N/A 2014    [facility] (2ND FLR)    EYE SURGERY Bilateral 2008    laser surgery both eyes    INSERTION, IOL PROSTHESIS Right 2013    [facility] by [facility] at [facility] OR 1ST FLR    INSERTION, IOL PROSTHESIS Left 2013    [facility] at [facility] 1ST FLR    NASAL SEPTUM SURGERY      PHACOEMULSIFICATION, CATARACT Right 2013    [facility] at [facility] 1ST FLR    PHACOEMULSIFICATION, CATARACT Left 2013    [facility] at [facility] 1ST FLR    PYELOGRAM-RETROGRADE Right 2016    [facility] OR    REMOVAL-STENT-URETERAL Right 2016    [facility] at [facility] OR    SUBTOTAL COLECTOMY  2012    transverse colon, for incarcerated umbilical hernia, Dr. Kat Trujillo-Katie    URETEROSCOPY Right 2016    [facility] at [facility] OR       Social History     Socioeconomic History    Marital status:      Spouse name: Not on file    Number of children: Not on file    Years of education: Not on file    Highest education level: Not on file   Occupational History    Not on file   Social Needs    Financial resource strain: Not on file    Food insecurity:     Worry: Not on file     Inability: Not on file    Transportation needs:     Medical: Not on file     Non-medical: Not on file   Tobacco Use    Smoking status: Former Smoker     Packs/day: 0.50     Years: 15.00     Pack years: 7.50     Types: Cigarettes     Last attempt to quit: 1982     Years since quittin.8    Smokeless tobacco: Former User    Tobacco comment: smoked one pack per week   Substance and Sexual Activity    Alcohol use: No    Drug use: No    Sexual activity: Not Currently   Lifestyle     Physical activity:     Days per week: Not on file     Minutes per session: Not on file    Stress: Not on file   Relationships    Social connections:     Talks on phone: Not on file     Gets together: Not on file     Attends [employer] service: Not on file     Active member of club or organization: Not on file     Attends meetings of clubs or organizations: Not on file     Relationship status: Not on file   Other Topics Concern    Not on file   Social History Narrative    Not on file         Vital Signs Range (Last 24H):  Temp:  [36.6 °C (97.8 °F)-37 °C (98.6 °F)]   Pulse:  [67-80]   Resp:  [16-20]   BP: (136-181)/(62-80)   SpO2:  [97 %-100 %]       CBC:   Recent Labs     05/09/19  1322 05/10/19  0321   WBC 7.15 5.74   RBC 3.47* 3.39*   HGB 10.0* 9.7*   HCT 31.2* 30.6*    288   MCV 90 90   MCH 28.8 28.6   MCHC 32.1 31.7*       CMP:   Recent Labs     05/09/19  1322 05/10/19  0321   * 135*   K 4.5 3.9    103   CO2 23 24   BUN 21 18   CREATININE 1.3 1.0   * 162*   CALCIUM 9.1 9.0       INR  No results for input(s): PT, INR, PROTIME, APTT in the last 72 hours.      EKG:  Vent. Rate : 073 BPM     Atrial Rate : 073 BPM     P-R Int : 304 ms          QRS Dur : 174 ms      QT Int : 464 ms       P-R-T Axes : 063 -51 043 degrees     QTc Int : 511 ms    Sinus rhythm with 1st degree A-V block  Left atrial abnormality/enlargement  Right bundle branch block  Left anterior fascicular block  Bifascicular block   Abnormal ECG  When compared with ECG of 06-JAN-2018 15:11,  No significant change was found  Confirmed by PIPPA MACEDO MD ([id number]) on 1/22/2018 11:36:11 AM    2D Echo:  Technically difficult study    1 - Mild left ventricular enlargement.     2 - Normal left ventricular systolic function (EF 60-65%).     3 - Right ventricular enlargement with normal systolic function.     4 - Grade I left ventricular diastolic dysfunction.     5 - Biatrial enlargement.     6 - Elevated central venous pressure.     This  document has been electronically    SIGNED BY: Oliver Marquez MD On: 11/17/2014 16:00      Anesthesia Evaluation    I have reviewed the Patient Summary Reports.    I have reviewed the Nursing Notes.   I have reviewed the Medications.     Review of Systems  Anesthesia Hx:  No problems with previous Anesthesia    Social:  Former Smoker    Hematology/Oncology:     Oncology Normal    -- Anemia:   EENT/Dental:EENT/Dental Normal   Cardiovascular:   Hypertension PVD hyperlipidemia    Pulmonary:  Pulmonary Normal    Renal/:   Chronic Renal Disease, CRI    Hepatic/GI:  Hepatic/GI Normal    Musculoskeletal:  Musculoskeletal Normal    Neurological:   TIA,    Endocrine:   Diabetes, poorly controlled, type 2    Dermatological:   left hand cellulits   Psych:  Psychiatric Normal           Physical Exam  General:  Morbid Obesity    Airway/Jaw/Neck:  Airway Findings: Mouth Opening: Normal Tongue: Normal  General Airway Assessment: Adult  Mallampati: I  TM Distance: Normal, at least 6 cm  Jaw/Neck Findings:  Neck ROM: Normal ROM     Eyes/Ears/Nose:  EYES/EARS/NOSE FINDINGS: Normal   Dental:  Dental Findings: Edentulous   Chest/Lungs:  Chest/Lungs Findings: Clear to auscultation     Heart/Vascular:  Heart Findings: Rate: Normal  Rhythm: Regular Rhythm  Heart murmur: negative      Skin:  Venous static changes ble with weeping wounds; left hand wrapped   Mental Status:  Mental Status Findings:  Cooperative, Alert and Oriented         Anesthesia Plan  Type of Anesthesia, risks & benefits discussed:  Anesthesia Type:  general  Patient's Preference:   Intra-op Monitoring Plan: standard ASA monitors  Intra-op Monitoring Plan Comments:   Post Op Pain Control Plan: multimodal analgesia, IV/PO Opioids PRN and per primary service following discharge from PACU  Post Op Pain Control Plan Comments:   Induction:   IV  Beta Blocker:         Informed Consent: Patient understands risks and agrees with Anesthesia plan.  Questions answered. Anesthesia  consent signed with patient.  ASA Score: 3     Day of Surgery Review of History & Physical:            Ready For Surgery From Anesthesia Perspective.

## 2019-05-10 NOTE — NURSING
UA collected via straight cath per orders.  Patient tolerated well. Specimen placed in biohazard bag with requisition form (initialed x2) and placed in lab carrier bin on unit waiting for pick-up.

## 2019-05-10 NOTE — H&P
[facility] Hospital Medicine  History & Physical    Patient Name: Sherin Ortiz  MRN: [id number]  Admission Date: 5/9/2019  Attending Physician: JADA Alba MD   Primary Care Provider: Ame Vasquez MD    [facility] Medicine Team: [facility] MED F Pamela Bain PA-C     Patient information was obtained from patient, past medical records and ER records.     Subjective:     Principal Problem:Cellulitis of left hand    Chief Complaint:   Chief Complaint   Patient presents with    Hand Swelling     L hand swelling         HPI: Ms. Ortiz is a 73 y/o F with a PMH of T2DM (A1c 9.8), HTN, HLD, and ulcers of the lower extremities (recently admitted to hospital 03/22/2019 for cellulitis of L heel ulcer, completed tx with Vancomycin 04/12/2019, followed by  wound care) who is being admitted to hospital medicine observation for L hand edema. Patient reports L hand edema with associated pain and erythema with onset early this morning. Patient reports numerous episodes of L hand edema over the past few years which the patient attributes to a blown vein received during a missed IV stick a number of years ago. However, today's episode is worse than baseline. Pain is severe, rated 10/10. No recent falls or trauma. Patient has been taking care of her bed-bound sister and having to push her sister frequently from side to side for adjustments and bed pan usage, and therefore her L hand has been weaker and more painful than baseline. Patient endorses difficulty ranging the digits on the left hand. Patient denies fever, chills, CP, SOB, N/V/D, extremity numbness, or extremity weakness.     Labs in ED notable for WBC 7.15, sed rate 104, CRP 98.4, procal 0.08, glucose 333. Vitals notable for /80 mmHg, Temp 98.6 F, pulse 80 bpm. MRI of hand and wrist: fluid distention and synovial enhancement of the 4th compartment of the extensor tendons consistent with tenosynovitis, diffuse skin soft tissue edema  and enhancement especially dorsally could indicate cellulitis.  No abscess. Ortho consulted and wrist joint aspirated with analysis not concerning for septic arthritis.  Also aspirated dorsal fluctuance and sent for culture. This was bloody fluid, not purulent. Recommend admission to medicine for IV abx and elevation in splint.     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    Benign essential hypertension 11/14/2012    Cataract     s/p phacoemulsification    Chronic kidney disease (CKD), stage III (moderate) 9/12/2013    Diabetic peripheral neuropathy associated with type 2 diabetes mellitus 11/14/2014    causing right hemiparesis    Gait disorder     Hyperlipidemia     Iritis - Both Eyes 6/10/2013    Kidney stone     Lymphedema     Morbid obesity with BMI of 40.0-44.9, adult 2/18/2015    Nephrolithiasis 4/20/2016    NS (nuclear sclerosis) 4/1/2013    Nuclear sclerosis - Both Eyes 4/29/2013    Preseptal cellulitis - Right Eye 4/29/2013    Proliferative diabetic retinopathy - Both Eyes 4/29/2013    Proliferative diabetic retinopathy, both eyes 4/1/2013    PSC (posterior subcapsular cataract) - Both Eyes 4/29/2013    S/P hernia repair 12/19/2012    TIA (transient ischemic attack) 11/18/2014    Tinea pedis 7/24/2012    Tinea pedis is present on both feet.     Type 2 diabetes mellitus with renal manifestations, controlled 12/12/2013    Type 2 diabetes, controlled, with moderate nonproliferative diabetic retinopathy without macular edema 9/17/2015    Ulcer of left lower extremity, limited to breakdown of skin 7/8/2015    Unspecified cerebral artery occlusion with cerebral infarction 11/16/2014    Unspecified venous (peripheral) insufficiency     Ureteral stone with hydronephrosis 1/27/2016    UTI (lower urinary tract infection)     Vaginal infection     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       Past Surgical History:   Procedure  Laterality Date    APPENDECTOMY      CATARACT EXTRACTION W/  INTRAOCULAR LENS IMPLANT Left 5/21/2013    CATARACT EXTRACTION W/  INTRAOCULAR LENS IMPLANT Right 6/4/2013    CHOLECYSTECTOMY      COLONOSCOPY  12/22/2005    normal    CYSTOSCOPY  4/20/2016    [facility]    CYSTOSCOPY WITH STENT PLACEMENT Right 1/27/2016    [facility] OR    ESOPHAGOGASTRODUODENOSCOPY  12/21/2015    hiatal hernia, Schatzki ring    ESOPHAGOGASTRODUODENOSCOPY (EGD) N/A 6/30/2014    Performed by Jaspreet Ng MD at [facility] ENDO (2ND FLR)    EYE SURGERY Bilateral 2008    laser surgery both eyes    INSERTION, IOL PROSTHESIS Right 6/4/2013    Performed by Federico Schwartz MD at [facility] OR 1ST FLR    INSERTION, IOL PROSTHESIS Left 5/21/2013    Performed by [facility] Schwartz MD at [facility] OR 1ST FLR    NASAL SEPTUM SURGERY      PHACOEMULSIFICATION, CATARACT Right 6/4/2013    Performed by [facility] Schwartz [facility] at [facility] OR 1ST FLR    PHACOEMULSIFICATION, CATARACT Left 5/21/2013    Performed by [facility] Schwartz [facility] at [facility] OR 1ST FLR    PYELOGRAM-RETROGRADE Right 4/20/2016    [facility]    REMOVAL-STENT-URETERAL Right 4/20/2016    [facility] at [facility] OR    SUBTOTAL COLECTOMY  12/13/2012    transverse colon, for incarcerated umbilical hernia, Dr. Kat Bower    URETEROSCOPY Right 4/20/2016    [facility] by [facility] at [facility] OR       Review of patient's allergies indicates:   Allergen Reactions    Penicillins Hives     Other reaction(s): Hives    Sulfa (sulfonamide antibiotics) Other (See Comments)     Shakes, pt states her doctor told her the shakes were possibly caused by an allergy to sulfa       No current facility-administered medications on file prior to encounter.      Current Outpatient Medications on File Prior to Encounter   Medication Sig    ACCU-CHEK RAMIRO PLUS  "TEST STRP Strp TEST TWICE DAILY    ACCU-CHEK SOFTCLIX LANCETS Misc Test twice daily    aspirin 81 MG Chew Take 81 mg by mouth once daily.      atorvastatin (LIPITOR) 40 MG tablet TAKE 1 TABLET EVERY EVENING    BD INSULIN SYRINGE ULTRA-FINE 1/2 mL 30 gauge x 1/2" Syrg USE EVERY NIGHT    bumetanide (BUMEX) 1 MG tablet Take 2 tablets (2 mg total) by mouth once daily.    clopidogrel (PLAVIX) 75 mg tablet TAKE 1 TABLET EVERY DAY    diclofenac sodium 1 % Gel Apply 2 g topically nightly as needed. (Patient taking differently: Apply 2 g topically nightly as needed (pain). )    LANTUS U-100 INSULIN 100 unit/mL injection INJECT 7 TO 10 UNITS SUBCUTANEOUSLY IN THE EVENING DEPENDING ON SCALE (DISCARD EACH VIAL AFTER 28 DAYS) (Patient taking differently: INJECT 25 UNITS SUBCUTANEOUSLY IN THE EVENING DEPENDING ON SCALE (DISCARD EACH VIAL AFTER 28 DAYS))    vancomycin HCl in 5 % dextrose (VANCOMYCIN IN 5 % DEXTROSE) 1.25 gram/250 mL Soln Inject 250 mLs (1,250 mg total) into the vein once daily.     Family History     Problem Relation (Age of Onset)    Cataracts Sister, Brother    Diabetes Sister    Heart disease Brother    Leukemia Mother        Tobacco Use    Smoking status: Former Smoker     Packs/day: 0.50     Years: 15.00     Pack years: 7.50     Types: Cigarettes     Last attempt to quit: 1982     Years since quittin.8    Smokeless tobacco: Former User    Tobacco comment: smoked one pack per week   Substance and Sexual Activity    Alcohol use: No    Drug use: No    Sexual activity: Not Currently     Review of Systems   Constitutional: Negative for activity change, appetite change, chills, diaphoresis, fatigue and fever.   HENT: Negative for congestion, ear pain, rhinorrhea, sore throat and trouble swallowing.    Eyes: Negative for photophobia, pain, redness and visual disturbance.   Respiratory: Negative for cough, shortness of breath and wheezing.    Cardiovascular: Negative for chest pain, " palpitations and leg swelling.   Gastrointestinal: Negative for abdominal pain, constipation, diarrhea, nausea and vomiting.   Genitourinary: Negative for dysuria, flank pain, frequency, hematuria and urgency.   Musculoskeletal: Negative for arthralgias, gait problem, myalgias and neck pain.        L hand edema, erythema, and pain   Skin: Negative for pallor, rash and wound.   Neurological: Negative for dizziness, syncope, weakness, light-headedness and headaches.   Psychiatric/Behavioral: Negative for confusion and decreased concentration. The patient is not nervous/anxious.      Objective:     Vital Signs (Most Recent):  Temp: 97.9 °F (36.6 °C) (05/09/19 1842)  Pulse: 68 (05/09/19 2200)  Resp: 16 (05/09/19 2200)  BP: (!) 161/73 (05/09/19 2200)  SpO2: 98 % (05/09/19 2200) Vital Signs (24h Range):  Temp:  [97.8 °F (36.6 °C)-98.6 °F (37 °C)] 97.9 °F (36.6 °C)  Pulse:  [67-80] 68  Resp:  [16-18] 16  SpO2:  [98 %-100 %] 98 %  BP: (144-161)/(64-80) 161/73     Weight: 113.4 kg (250 lb)  Body mass index is 39.16 kg/m².    Physical Exam   Constitutional: She is oriented to person, place, and time. She appears well-developed and well-nourished. No distress.   HENT:   Head: Normocephalic and atraumatic.   Right Ear: External ear normal.   Left Ear: External ear normal.   Mouth/Throat: Oropharynx is clear and moist.   Eyes: Conjunctivae and EOM are normal.   Neck: Normal range of motion. Neck supple. No JVD present. No tracheal deviation present.   Cardiovascular: Normal rate, regular rhythm, normal heart sounds and intact distal pulses.   Pulmonary/Chest: Effort normal and breath sounds normal. She has no wheezes.   Abdominal: Soft. Bowel sounds are normal. She exhibits no distension. There is no tenderness. There is no guarding.   Neurological: She is alert and oriented to person, place, and time.   Skin: Skin is warm and dry. She is not diaphoretic. There is erythema.   Bilateral feet and ankles with dressings and bandages  in place. L forearm and hand splinted and elevated.    Psychiatric: She has a normal mood and affect. Her behavior is normal. Judgment and thought content normal.         CRANIAL NERVES     CN III, IV, VI   Extraocular motions are normal.        Significant Labs:   BMP:   Recent Labs   Lab 05/09/19  1322   *   *   K 4.5      CO2 23   BUN 21   CREATININE 1.3   CALCIUM 9.1     CBC:   Recent Labs   Lab 05/09/19  1322   WBC 7.15   HGB 10.0*   HCT 31.2*        POCT Glucose: No results for input(s): POCTGLUCOSE in the last 48 hours.    Significant Imaging: I have reviewed all pertinent imaging results/findings within the past 24 hours.    Assessment/Plan:     * Cellulitis of left hand  - ESR/CRP elevated on admit, VSS, WBC 7.15  - MRI of L hand and wrist consistent with tenosynovitis and cellulitis   - ortho consulted, recs appreciated:   - wrist joint aspirated with analysis not concerning for septic arthritis   - dorsal fluctuance aspirated and sent for culture, results pending   - admit for IV abx, elevation in splint   - NPO after midnight as precaution   - ortho will follow-up with patient in AM   - orders placed for prn pain meds  - 2 g Ceftriaxone IV and 1750 mg Vancomycin IV administered, will continue    Ulcer of heel and midfoot  - patient recently admitted to hospital 03/22/2019 for cellulitis of L heel ulcer, completed tx with Vancomycin on 04/12/2019  - followed by wound care and  wound care  - will consult wound care    Uncontrolled type 2 diabetes mellitus with stage 3 chronic kidney disease, with long-term current use of insulin  - glucose in   - home medications: Glimepiride 1 mg 2 tablets daily at lunchtime, Lantus 35 units qhs  - hospital regimen: insulin detemir 18u qhs + mod dose SSI for NPO patients    PAOD (peripheral arterial occlusive disease)  - continue Plavix 75 mg qd, ASA 81 mg qd    Anemia of chronic disease  - Hgb on admit 10.0, patient's baseline  9.5-10.5  - monitor with daily CBC    Hyperlipidemia LDL goal <100  - continue Lipitor 40 mg qd    VTE Risk Mitigation (From admission, onward)        Ordered     Place sequential compression device  Until discontinued      05/09/19 2339     IP VTE HIGH RISK PATIENT  Once      05/09/19 2339     Place AUTUMN hose  Until discontinued      05/09/19 2339             Pamela Bain PA-C  Department of Hospital Medicine   [facility]

## 2019-05-10 NOTE — ED NOTES
Assumed care of patient. Pt remains on cardiac monitor, continuous pulse ox, and cycling blood pressures. Bed placed in low locked position, side rails up x2, call bell is within reach. Will continue to monitor. Pt not c/o pain at this moment

## 2019-05-11 PROBLEM — M86.9 OSTEOMYELITIS: Status: ACTIVE | Noted: 2019-05-11

## 2019-05-11 PROBLEM — M86.9 OSTEOMYELITIS OF TOE: Status: ACTIVE | Noted: 2019-05-11

## 2019-05-11 LAB
ANION GAP SERPL CALC-SCNC: 9 MMOL/L (ref 8–16)
BASOPHILS # BLD AUTO: 0.01 K/UL (ref 0–0.2)
BASOPHILS NFR BLD: 0.2 % (ref 0–1.9)
BUN SERPL-MCNC: 22 MG/DL (ref 8–23)
CALCIUM SERPL-MCNC: 9.1 MG/DL (ref 8.7–10.5)
CHLORIDE SERPL-SCNC: 102 MMOL/L (ref 95–110)
CO2 SERPL-SCNC: 22 MMOL/L (ref 23–29)
CREAT SERPL-MCNC: 1.1 MG/DL (ref 0.5–1.4)
DIFFERENTIAL METHOD: ABNORMAL
EOSINOPHIL # BLD AUTO: 0 K/UL (ref 0–0.5)
EOSINOPHIL NFR BLD: 0 % (ref 0–8)
ERYTHROCYTE [DISTWIDTH] IN BLOOD BY AUTOMATED COUNT: 14.3 % (ref 11.5–14.5)
EST. GFR  (AFRICAN AMERICAN): 56.4 ML/MIN/1.73 M^2
EST. GFR  (NON AFRICAN AMERICAN): 48.9 ML/MIN/1.73 M^2
GLUCOSE SERPL-MCNC: 224 MG/DL (ref 70–110)
HCT VFR BLD AUTO: 31.4 % (ref 37–48.5)
HGB BLD-MCNC: 10.1 G/DL (ref 12–16)
IMM GRANULOCYTES # BLD AUTO: 0.03 K/UL (ref 0–0.04)
IMM GRANULOCYTES NFR BLD AUTO: 0.5 % (ref 0–0.5)
LYMPHOCYTES # BLD AUTO: 0.7 K/UL (ref 1–4.8)
LYMPHOCYTES NFR BLD: 11.6 % (ref 18–48)
MCH RBC QN AUTO: 28.6 PG (ref 27–31)
MCHC RBC AUTO-ENTMCNC: 32.2 G/DL (ref 32–36)
MCV RBC AUTO: 89 FL (ref 82–98)
MONOCYTES # BLD AUTO: 0.3 K/UL (ref 0.3–1)
MONOCYTES NFR BLD: 4.7 % (ref 4–15)
NEUTROPHILS # BLD AUTO: 5.3 K/UL (ref 1.8–7.7)
NEUTROPHILS NFR BLD: 83 % (ref 38–73)
NRBC BLD-RTO: 0 /100 WBC
PLATELET # BLD AUTO: 309 K/UL (ref 150–350)
PMV BLD AUTO: 9.4 FL (ref 9.2–12.9)
POCT GLUCOSE: 216 MG/DL (ref 70–110)
POCT GLUCOSE: 253 MG/DL (ref 70–110)
POCT GLUCOSE: 272 MG/DL (ref 70–110)
POTASSIUM SERPL-SCNC: 4.7 MMOL/L (ref 3.5–5.1)
RBC # BLD AUTO: 3.53 M/UL (ref 4–5.4)
SODIUM SERPL-SCNC: 133 MMOL/L (ref 136–145)
VANCOMYCIN TROUGH SERPL-MCNC: 18.7 UG/ML (ref 10–22)
WBC # BLD AUTO: 6.32 K/UL (ref 3.9–12.7)

## 2019-05-11 PROCEDURE — 99223 PR INITIAL HOSPITAL CARE,LEVL III: ICD-10-PCS | Mod: HCNC,,, | Performed by: PODIATRIST

## 2019-05-11 PROCEDURE — 25000003 PHARM REV CODE 250: Mod: HCNC | Performed by: NURSE PRACTITIONER

## 2019-05-11 PROCEDURE — 36415 COLL VENOUS BLD VENIPUNCTURE: CPT | Mod: HCNC

## 2019-05-11 PROCEDURE — 85025 COMPLETE CBC W/AUTO DIFF WBC: CPT | Mod: HCNC

## 2019-05-11 PROCEDURE — 99223 PR INITIAL HOSPITAL CARE,LEVL III: ICD-10-PCS | Mod: HCNC,GC,, | Performed by: INTERNAL MEDICINE

## 2019-05-11 PROCEDURE — 20600001 HC STEP DOWN PRIVATE ROOM: Mod: HCNC

## 2019-05-11 PROCEDURE — 80048 BASIC METABOLIC PNL TOTAL CA: CPT | Mod: HCNC

## 2019-05-11 PROCEDURE — 99233 PR SUBSEQUENT HOSPITAL CARE,LEVL III: ICD-10-PCS | Mod: HCNC,,, | Performed by: NURSE PRACTITIONER

## 2019-05-11 PROCEDURE — 99233 PR SUBSEQUENT HOSPITAL CARE,LEVL III: ICD-10-PCS | Mod: HCNC,,,

## 2019-05-11 PROCEDURE — 99233 SBSQ HOSP IP/OBS HIGH 50: CPT | Mod: HCNC,,, | Performed by: NURSE PRACTITIONER

## 2019-05-11 PROCEDURE — 99223 1ST HOSP IP/OBS HIGH 75: CPT | Mod: HCNC,GC,, | Performed by: INTERNAL MEDICINE

## 2019-05-11 PROCEDURE — 80202 ASSAY OF VANCOMYCIN: CPT | Mod: HCNC

## 2019-05-11 PROCEDURE — 63600175 PHARM REV CODE 636 W HCPCS: Mod: HCNC | Performed by: NURSE PRACTITIONER

## 2019-05-11 PROCEDURE — 99223 1ST HOSP IP/OBS HIGH 75: CPT | Mod: HCNC,,, | Performed by: PODIATRIST

## 2019-05-11 PROCEDURE — 25000003 PHARM REV CODE 250: Mod: HCNC | Performed by: PHYSICIAN ASSISTANT

## 2019-05-11 PROCEDURE — 99233 SBSQ HOSP IP/OBS HIGH 50: CPT | Mod: HCNC,,,

## 2019-05-11 RX ORDER — COLCHICINE 0.6 MG/1
0.6 TABLET, FILM COATED ORAL 2 TIMES DAILY
Status: DISCONTINUED | OUTPATIENT
Start: 2019-05-11 | End: 2019-05-14 | Stop reason: HOSPADM

## 2019-05-11 RX ADMIN — COLCHICINE 0.6 MG: 0.6 TABLET, FILM COATED ORAL at 11:05

## 2019-05-11 RX ADMIN — ATORVASTATIN CALCIUM 40 MG: 20 TABLET, FILM COATED ORAL at 09:05

## 2019-05-11 RX ADMIN — COLCHICINE 0.6 MG: 0.6 TABLET, FILM COATED ORAL at 09:05

## 2019-05-11 RX ADMIN — INSULIN ASPART 4 UNITS: 100 INJECTION, SOLUTION INTRAVENOUS; SUBCUTANEOUS at 11:05

## 2019-05-11 RX ADMIN — MICONAZOLE NITRATE: 20 OINTMENT TOPICAL at 09:05

## 2019-05-11 RX ADMIN — INSULIN DETEMIR 18 UNITS: 100 INJECTION, SOLUTION SUBCUTANEOUS at 09:05

## 2019-05-11 RX ADMIN — PREDNISONE 40 MG: 20 TABLET ORAL at 09:05

## 2019-05-11 RX ADMIN — LEVOFLOXACIN 750 MG: 750 TABLET, FILM COATED ORAL at 09:05

## 2019-05-11 RX ADMIN — INSULIN ASPART 6 UNITS: 100 INJECTION, SOLUTION INTRAVENOUS; SUBCUTANEOUS at 05:05

## 2019-05-11 RX ADMIN — ASPIRIN 81 MG CHEWABLE TABLET 81 MG: 81 TABLET CHEWABLE at 09:05

## 2019-05-11 NOTE — SUBJECTIVE & OBJECTIVE
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    Benign essential hypertension 11/14/2012    Cataract     s/p phacoemulsification    Chronic kidney disease (CKD), stage III (moderate) 9/12/2013    Diabetic peripheral neuropathy associated with type 2 diabetes mellitus 11/14/2014    causing right hemiparesis    Gait disorder     Hyperlipidemia     Iritis - Both Eyes 6/10/2013    Kidney stone     Lymphedema     Morbid obesity with BMI of 40.0-44.9, adult 2/18/2015    Nephrolithiasis 4/20/2016    NS (nuclear sclerosis) 4/1/2013    Nuclear sclerosis - Both Eyes 4/29/2013    Preseptal cellulitis - Right Eye 4/29/2013    Proliferative diabetic retinopathy - Both Eyes 4/29/2013    Proliferative diabetic retinopathy, both eyes 4/1/2013    PSC (posterior subcapsular cataract) - Both Eyes 4/29/2013    S/P hernia repair 12/19/2012    TIA (transient ischemic attack) 11/18/2014    Tinea pedis 7/24/2012    Tinea pedis is present on both feet.     Type 2 diabetes mellitus with renal manifestations, controlled 12/12/2013    Type 2 diabetes, controlled, with moderate nonproliferative diabetic retinopathy without macular edema 9/17/2015    Ulcer of left lower extremity, limited to breakdown of skin 7/8/2015    Unspecified cerebral artery occlusion with cerebral infarction 11/16/2014    Unspecified venous (peripheral) insufficiency     Ureteral stone with hydronephrosis 1/27/2016    UTI (lower urinary tract infection)     Vaginal infection     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       Past Surgical History:   Procedure Laterality Date    APPENDECTOMY      CATARACT EXTRACTION W/  INTRAOCULAR LENS IMPLANT Left 5/21/2013    CATARACT EXTRACTION W/  INTRAOCULAR LENS IMPLANT Right 6/4/2013    CHOLECYSTECTOMY      COLONOSCOPY  12/22/2005    normal    CYSTOSCOPY  4/20/2016    [facility]    CYSTOSCOPY WITH STENT  "PLACEMENT Right 1/27/2016    [facility]    ESOPHAGOGASTRODUODENOSCOPY  12/21/2015    hiatal hernia, Schatzki ring    ESOPHAGOGASTRODUODENOSCOPY (EGD) N/A 6/30/2014    Performed by J[facility]reet [facility] at [facility] ENDO (2ND FLR)    EYE SURGERY Bilateral 2008    laser surgery both eyes    INSERTION, IOL PROSTHESIS Right 6/4/2013    [facility] Schwartz [facility] at [facility] OR 1ST FLR    INSERTION, IOL PROSTHESIS Left 5/21/2013    [facility] at [facility] OR 1ST FLR    NASAL SEPTUM SURGERY      PHACOEMULSIFICATION, CATARACT Right 6/4/2013    [facility] at [facility] OR 1ST FLR    PHACOEMULSIFICATION, CATARACT Left 5/21/2013    [facility] by [facility] at [facility] OR 1ST FLR    PYELOGRAM-RETROGRADE Right 4/20/2016    [facility] at [facility]    REMOVAL-STENT-URETERAL Right 4/20/2016    Performed by [facility]    SUBTOTAL COLECTOMY  12/13/2012    transverse colon, for incarcerated umbilical hernia, Dr. Kat Bower    URETEROSCOPY Right 4/20/2016    Per[facility] by [facility] at [facility] OR       Review of patient's allergies indicates:   Allergen Reactions    Penicillins Hives     Other reaction(s): Hives    Sulfa (sulfonamide antibiotics) Other (See Comments)     Shajosie, pt states her doctor told her the shakes were possibly caused by an allergy to sulfa       Medications:  Medications Prior to Admission   Medication Sig    ACCU-CHEK RAMIRO PLUS TEST STRP Strp TEST TWICE DAILY    ACCU-CHEK SOFTCLIX LANCETS Misc Test twice daily    aspirin 81 MG Chew Take 81 mg by mouth once daily.      atorvastatin (LIPITOR) 40 MG tablet TAKE 1 TABLET EVERY EVENING    BD INSULIN SYRINGE ULTRA-FINE 1/2 mL 30 gauge x 1/2" Syrg USE EVERY NIGHT    clopidogrel (PLAVIX) 75 mg tablet TAKE 1 TABLET EVERY DAY    LANTUS U-100 INSULIN 100 unit/mL injection INJECT 7 TO 10 UNITS " SUBCUTANEOUSLY IN THE EVENING DEPENDING ON SCALE (DISCARD EACH VIAL AFTER 28 DAYS) (Patient taking differently: INJECT 25 UNITS SUBCUTANEOUSLY IN THE EVENING DEPENDING ON SCALE (DISCARD EACH VIAL AFTER 28 DAYS))     Antibiotics (From admission, onward)    Start     Stop Route Frequency Ordered    05/10/19 2300  vancomycin 2 g in 0.9% sodium chloride 500 mL IVPB  (vancomycin IVPB)      -- IV Every 24 hours (non-standard times) 05/10/19 1222    05/10/19 1230  levoFLOXacin tablet 750 mg       0859 Oral Daily 05/10/19 1222        Antifungals (From admission, onward)    Start     Stop Route Frequency Ordered    05/10/19 2100  miconazole nitrate 2% ointment      -- Top 2 times daily 05/10/19 1222        Antivirals (From admission, onward)    None           Immunization History   Administered Date(s) Administered    Influenza 2008, 2010, 2010, 2011    Influenza - High Dose 2012, 2013, 2014, 2015, 2016, 10/09/2017    Pneumococcal Conjugate - 13 Valent 2016, 12/15/2017    Pneumococcal Polysaccharide - 23 Valent 12/10/2008    Tdap 12/15/2016       Family History     Problem Relation (Age of Onset)    Cataracts Sister, Brother    Diabetes Sister    Heart disease Brother    Leukemia Mother        Social History     Socioeconomic History    Marital status:      Spouse name: Not on file    Number of children: Not on file    Years of education: Not on file    Highest education level: Not on file   Occupational History    Not on file   Social Needs    Financial resource strain: Not on file    Food insecurity:     Worry: Not on file     Inability: Not on file    Transportation needs:     Medical: Not on file     Non-medical: Not on file   Tobacco Use    Smoking status: Former Smoker     Packs/day: 0.50     Years: 15.00     Pack years: 7.50     Types: Cigarettes     Last attempt to quit: 1982     Years since quittin.8    Smokeless tobacco:  Former User    Tobacco comment: smoked one pack per week   Substance and Sexual Activity    Alcohol use: No    Drug use: No    Sexual activity: Not Currently   Lifestyle    Physical activity:     Days per week: Not on file     Minutes per session: Not on file    Stress: Not on file   Relationships    Social connections:     Talks on phone: Not on file     Gets together: Not on file     Attends [employer] service: Not on file     Active member of club or organization: Not on file     Attends meetings of clubs or organizations: Not on file     Relationship status: Not on file   Other Topics Concern    Not on file   Social History Narrative    Not on file     Review of Systems   Constitutional: Negative for chills and fever.   HENT: Negative for congestion and rhinorrhea.    Eyes: Negative for photophobia and visual disturbance.   Respiratory: Negative for cough and shortness of breath.    Cardiovascular: Negative for chest pain, palpitations and leg swelling.   Gastrointestinal: Negative for abdominal pain, constipation and diarrhea.   Endocrine: Negative for cold intolerance and heat intolerance.   Genitourinary: Negative for dysuria and hematuria.   Musculoskeletal: Negative for myalgias.   Skin: Positive for wound (lower extermities). Negative for rash.   Neurological: Negative for weakness and headaches.   Hematological: Negative for adenopathy. Does not bruise/bleed easily.   Psychiatric/Behavioral: Negative for agitation and behavioral problems.     Objective:     Vital Signs (Most Recent):  Temp: 98.4 °F (36.9 °C) (05/11/19 0730)  Pulse: 74 (05/11/19 0730)  Resp: 20 (05/11/19 0730)  BP: 132/63 (05/11/19 0730)  SpO2: 98 % (05/11/19 0730) Vital Signs (24h Range):  Temp:  [97.6 °F (36.4 °C)-98.7 °F (37.1 °C)] 98.4 °F (36.9 °C)  Pulse:  [72-75] 74  Resp:  [18-20] 20  SpO2:  [95 %-98 %] 98 %  BP: (119-195)/(55-86) 132/63     Weight: 127 kg (279 lb 15.7 oz)  Body mass index is 43.85 kg/m².    Estimated  Creatinine Clearance: 60.3 mL/min (based on SCr of 1.1 mg/dL).    Physical Exam   Constitutional: She is oriented to person, place, and time. She appears well-developed and well-nourished. No distress.   HENT:   Head: Normocephalic and atraumatic.   Eyes: Pupils are equal, round, and reactive to light. EOM are normal. No scleral icterus.   Neck: Neck supple.   Cardiovascular: Normal rate, regular rhythm, normal heart sounds and intact distal pulses.   No murmur heard.  Pulmonary/Chest: Effort normal and breath sounds normal. No respiratory distress. She has no wheezes. She has no rales.   Abdominal: Soft. Bowel sounds are normal. She exhibits no distension. There is no tenderness.   Musculoskeletal: She exhibits edema (L hand edematous, mildly TTP). She exhibits no tenderness or deformity.   R and L lower extremities wrapped with bandages   Lymphadenopathy:     She has no cervical adenopathy.   Neurological: She is alert and oriented to person, place, and time.   Skin: Skin is warm and dry.   Psychiatric: She has a normal mood and affect. Her behavior is normal.       Significant Labs:   CBC:   Recent Labs   Lab 05/09/19  1322 05/10/19  0321 05/11/19  0325   WBC 7.15 5.74 6.32   HGB 10.0* 9.7* 10.1*   HCT 31.2* 30.6* 31.4*    288 309     CMP:   Recent Labs   Lab 05/09/19  1322 05/10/19  0321 05/11/19  0325   * 135* 133*   K 4.5 3.9 4.7    103 102   CO2 23 24 22*   * 162* 224*   BUN 21 18 22   CREATININE 1.3 1.0 1.1   CALCIUM 9.1 9.0 9.1   ANIONGAP 8 8 9   EGFRNONAA 39.9* 54.9* 48.9*       Significant Imaging: I have reviewed all pertinent imaging results/findings within the past 24 hours.

## 2019-05-11 NOTE — PLAN OF CARE
PCP- DR. SANAM GIBSON     PT HAS A RIDE HOME AND FAMILY SUPPORT WITH SISTER AND GRANDCHILDREN.    [facility] [address], [place] [address]      CURRENT WITH [facility]        05/10/19 0828   Discharge Assessment   Assessment Type Discharge Planning Assessment   Confirmed/corrected address and phone number on facesheet? Yes   Assessment information obtained from? Patient   Expected Length of Stay (days) 7   Communicated expected length of stay with patient/caregiver yes   Prior to hospitilization cognitive status: Alert/Oriented   Prior to hospitalization functional status: Assistive Equipment   Current cognitive status: Alert/Oriented   Current Functional Status: Assistive Equipment   Lives With sibling(s);grandchild(cr)   Able to Return to Prior Arrangements yes   Is patient able to care for self after discharge? Unable to determine at this time (comments)   Patient's perception of discharge disposition home health   Readmission Within the Last 30 Days no previous admission in last 30 days   Patient currently being followed by outpatient case management? No   Patient currently receives any other outside agency services? No   Equipment Currently Used at Home bedside commode;wheelchair;walker, standard;power chair   Do you have any problems affording any of your prescribed medications? No   Is the patient taking medications as prescribed? yes   Does the patient have transportation home? Yes   Transportation Anticipated family or friend will provide   Does the patient receive services at the [facility]? No   Discharge Plan A Rehab   Discharge Plan B Home with family;Home Health   Patient/Family in Agreement with Plan yes

## 2019-05-11 NOTE — ASSESSMENT & PLAN NOTE
74 yro F with PMH of IDDM2 (AIC 9.8), HTN, chronic LLE ulcers (admitted OM 3/22 for cellulitis, completed vanc and meropenem course 4/12), and CKD3 who presents to [facility] 5/9/19 with L hand edema and pain 2/2 cellulitis and gout, started on prednisone. She has chronic LLE ulcers no longer causing her any active issues, as well as new RLE ulcer on great toe present for 1 week. Podiatry evaluated R great toe and obtained a culture, as well as an MRI 5/10, which showed osteomyelitis.     -previously treated for cellulitis of chronic LLE ulcers (Wound Cx polymicrobial: MRSA, enterobacter aerogenes, enterobacter cloacae, pseudomonas, morganella, proteus, klebsiella) during hospital admission in March, finished vanc and mo course 4/12. At risk for multi-drug resistant organism.  -started on vanc and rocephin 5/9 on admission, rocephin switched to Levaquin 5/10  -continue vanc and Levaquin and FU R toe wound culture obtained by podiatry 5/10, and can tailor abx pending Cx results  -FU podiatry recomendations

## 2019-05-11 NOTE — ASSESSMENT & PLAN NOTE
MRI findings in keeping with acute osteomyelitis involving the right great toe distal phalanx with overlying soft tissue ulcer.  - podiatry following  Dressed foot with betadine wet to dry today,  Nursing to do hydrofera MWF, orders in.  -Wound care on board managing LE wounds with hydrofera, agree with hydrofera, recommend continue local wound care  - ID consulted   - patient continues on levoquin and vanc currently; will adjust as necessary

## 2019-05-11 NOTE — SUBJECTIVE & OBJECTIVE
Interval Hx: Pt seen at bedside, no pedal complaints at this time.    Scheduled Meds:   aspirin  81 mg Oral Daily    atorvastatin  40 mg Oral QHS    colchicine  0.6 mg Oral BID    insulin detemir U-100  18 Units Subcutaneous QHS    levoFLOXacin  750 mg Oral Daily    miconazole nitrate 2%   Topical (Top) BID    predniSONE  40 mg Oral Daily    vancomycin (VANCOCIN) IVPB  15 mg/kg Intravenous Q24H     Continuous Infusions:  PRN Meds:acetaminophen, dextrose 50%, glucagon (human recombinant), hydrALAZINE, HYDROcodone-acetaminophen, HYDROcodone-acetaminophen, insulin aspart U-100, ondansetron, polyethylene glycol, promethazine, ramelteon, sodium chloride 0.9%    Review of patient's allergies indicates:   Allergen Reactions    Penicillins Hives     Other reaction(s): Hives    Sulfa (sulfonamide antibiotics) Other (See Comments)     Shakes, pt states her doctor told her the shakes were possibly caused by an allergy to sulfa        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    Benign essential hypertension 11/14/2012    Cataract     s/p phacoemulsification    Chronic kidney disease (CKD), stage III (moderate) 9/12/2013    Diabetic peripheral neuropathy associated with type 2 diabetes mellitus 11/14/2014    causing right hemiparesis    Gait disorder     Hyperlipidemia     Iritis - Both Eyes 6/10/2013    Kidney stone     Lymphedema     Morbid obesity with BMI of 40.0-44.9, adult 2/18/2015    Nephrolithiasis 4/20/2016    NS (nuclear sclerosis) 4/1/2013    Nuclear sclerosis - Both Eyes 4/29/2013    Preseptal cellulitis - Right Eye 4/29/2013    Proliferative diabetic retinopathy - Both Eyes 4/29/2013    Proliferative diabetic retinopathy, both eyes 4/1/2013    PSC (posterior subcapsular cataract) - Both Eyes 4/29/2013    S/P hernia repair 12/19/2012    TIA (transient ischemic attack) 11/18/2014    Tinea pedis 7/24/2012    Tinea pedis is present on  both feet.     Type 2 diabetes mellitus with renal manifestations, controlled 12/12/2013    Type 2 diabetes, controlled, with moderate nonproliferative diabetic retinopathy without macular edema 9/17/2015    Ulcer of left lower extremity, limited to breakdown of skin 7/8/2015    Unspecified cerebral artery occlusion with cerebral infarction 11/16/2014    Unspecified venous (peripheral) insufficiency     Ureteral stone with hydronephrosis 1/27/2016    UTI (lower urinary tract infection)     Vaginal infection     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     Past Surgical History:   Procedure Laterality Date    APPENDECTOMY      CATARACT EXTRACTION W/  INTRAOCULAR LENS IMPLANT Left 5/21/2013    CATARACT EXTRACTION W/  INTRAOCULAR LENS IMPLANT Right 6/4/2013    CHOLECYSTECTOMY      COLONOSCOPY  12/22/2005    normal    CYSTOSCOPY  4/20/2016    [facility] OR    CYSTOSCOPY WITH STENT PLACEMENT Right 1/27/2016    [facility] at [facility]    ESOPHAGOGASTRODUODENOSCOPY  12/21/2015    hiatal hernia, Schatzki ring    ESOPHAGOGASTRODUODENOSCOPY (EGD) N/A 6/30/2014    Performed by J[facility]reet Ng MD at [facility] ENDO (2ND FLR)    EYE SURGERY Bilateral 2008    laser surgery both eyes    INSERTION, IOL PROSTHESIS Right 6/4/2013    [facility]formed by [facility] Schwartz [facility] at [facility] OR 1ST FLR    INSERTION, IOL PROSTHESIS Left 5/21/2013    [facility] at [facility] OR 1ST FLR    NASAL SEPTUM SURGERY      PHACOEMULSIFICATION, CATARACT Right 6/4/2013    [facility] at [facility] OR 1ST FLR    PHACOEMULSIFICATION, CATARACT Left 5/21/2013    [facility] at [facility] OR 1ST FLR    PYELOGRAM-RETROGRADE Right 4/20/2016    [facility] OR    REMOVAL-STENT-URETERAL Right 4/20/2016    [facility] at [facility] OR    SUBTOTAL COLECTOMY  12/13/2012    transverse  colon, for incarcerated umbilical hernia, Dr. Kat Bower    URETEROSCOPY Right 2016    [facility]       Family History     Problem Relation (Age of Onset)    Cataracts Sister, Brother    Diabetes Sister    Heart disease Brother    Leukemia Mother        Tobacco Use    Smoking status: Former Smoker     Packs/day: 0.50     Years: 15.00     Pack years: 7.50     Types: Cigarettes     Last attempt to quit: 1982     Years since quittin.8    Smokeless tobacco: Former User    Tobacco comment: smoked one pack per week   Substance and Sexual Activity    Alcohol use: No    Drug use: No    Sexual activity: Not Currently     Review of Systems   Constitutional: Negative for chills and fever.   Cardiovascular: Negative for leg swelling.   Gastrointestinal: Negative for nausea and vomiting.   Musculoskeletal: Negative for arthralgias and joint swelling.   Skin: Positive for wound. Negative for rash.   Psychiatric/Behavioral: Negative for agitation and confusion.     Objective:     Vital Signs (Most Recent):  Temp: 98.4 °F (36.9 °C) (19)  Pulse: 74 (19)  Resp: 20 (19)  BP: 132/63 (19)  SpO2: 98 % (19) Vital Signs (24h Range):  Temp:  [97.6 °F (36.4 °C)-98.7 °F (37.1 °C)] 98.4 °F (36.9 °C)  Pulse:  [72-75] 74  Resp:  [18-20] 20  SpO2:  [95 %-98 %] 98 %  BP: (119-195)/(55-86) 132/63     Weight: 127 kg (279 lb 15.7 oz)  Body mass index is 43.85 kg/m².    Foot Exam    General  Orientation: alert and oriented to person, place, and time   Affect: appropriate       Right Foot/Ankle     Inspection and Palpation  Ecchymosis: none  Tenderness: none   Swelling: none     Neurovascular  Dorsalis pedis: 1+  Posterior tibial: 1+  Saphenous nerve sensation: diminished  Tibial nerve sensation: diminished  Superficial peroneal nerve sensation: diminished  Deep peroneal nerve sensation: diminished  Sural nerve sensation:  diminished      Left Foot/Ankle      Inspection and Palpation  Ecchymosis: none  Tenderness: none   Swelling: none     Neurovascular  Dorsalis pedis: 1+  Posterior tibial: 1+  Saphenous nerve sensation: diminished  Tibial nerve sensation: diminished  Superficial peroneal nerve sensation: diminished  Deep peroneal nerve sensation: diminished  Sural nerve sensation: diminished            Laboratory:  A1C:   Recent Labs   Lab 03/22/19  1744   HGBA1C 9.8*     CBC:   Recent Labs   Lab 05/11/19  0325   WBC 6.32   RBC 3.53*   HGB 10.1*   HCT 31.4*      MCV 89   MCH 28.6   MCHC 32.2     CMP:   Recent Labs   Lab 05/11/19  0325   *   CALCIUM 9.1   *   K 4.7   CO2 22*      BUN 22   CREATININE 1.1     CRP:   Recent Labs   Lab 05/09/19  1322   CRP 98.4*     ESR:   Recent Labs   Lab 05/09/19  1322   SEDRATE 104*     Prealbumin: No results for input(s): PREALBUMIN in the last 48 hours.  Wound Cultures:   Recent Labs   Lab 03/22/19  2200 03/23/19  1119 05/09/19  2003 05/09/19  2008 05/10/19  1503   LABAERO KLEBSIELLA OXYTOCA  Moderate    MORGANELLA MORGANII  Moderate    METHICILLIN RESISTANT STAPHYLOCOCCUS AUREUS  Many    PROTEUS MIRABILIS  Few    PSEUDOMONAS AERUGINOSA  Many    PROVIDENCIA STUARTII  Many  Skin lyndsey also present    PSEUDOMONAS AERUGINOSA  Many    KLEBSIELLA OXYTOCA  Moderate   ENTEROBACTER AEROGENES  Few    METHICILLIN RESISTANT STAPHYLOCOCCUS AUREUS  Many    ENTEROBACTER CLOACAE  Few  Skin lyndsey also present   No growth No growth Insufficient incubation, culture in progress     Microbiology Results (last 7 days)     Procedure Component Value Units Date/Time    Aerobic culture [[id number]] Collected:  05/10/19 1503    Order Status:  Completed Specimen:  Wound from Toe, Right Foot Updated:  05/11/19 1334     Aerobic Bacterial Culture Insufficient incubation, culture in progress    AFB Culture & Smear [[id number]] Collected:  05/09/19 2003    Order Status:  Completed Specimen:  Joint  Fluid from Wrist, Left Updated:  05/10/19 2127     AFB Culture & Smear Culture in progress     AFB CULTURE STAIN No acid fast bacilli seen.    AFB Culture & Smear [[id number]] Collected:  05/09/19 2008    Order Status:  Completed Specimen:  Joint Fluid from Hand, Left Updated:  05/10/19 2127     AFB Culture & Smear Culture in progress     AFB CULTURE STAIN No acid fast bacilli seen.    Urine culture [[id number]] Collected:  05/10/19 1630    Order Status:  No result Specimen:  Urine Updated:  05/10/19 2105    Culture, Anaerobe [[id number]] Collected:  05/10/19 1503    Order Status:  Sent Specimen:  Wound from Toe, Right Foot Updated:  05/10/19 1716    Culture, Anaerobic [[id number]] Collected:  05/09/19 2008    Order Status:  Completed Specimen:  Joint Fluid from Hand, Left Updated:  05/10/19 0737     Anaerobic Culture Culture in progress    Culture, Anaerobic [[id number]] Collected:  05/09/19 2003    Order Status:  Completed Specimen:  Joint Fluid from Wrist, Left Updated:  05/10/19 0737     Anaerobic Culture Culture in progress    Aerobic culture [[id number]] Collected:  05/09/19 2003    Order Status:  Completed Specimen:  Joint Fluid from Wrist, Left Updated:  05/10/19 0731     Aerobic Bacterial Culture No growth    Aerobic culture [[id number]] Collected:  05/09/19 2008    Order Status:  Completed Specimen:  Joint Fluid from Hand, Left Updated:  05/10/19 0731     Aerobic Bacterial Culture No growth    Gram stain [[id number]] Collected:  05/09/19 2008    Order Status:  Completed Specimen:  Joint Fluid from Hand, Left Updated:  05/09/19 2111     Gram Stain Result Rare WBC's      No organisms seen    Gram stain [[id number]] Collected:  05/09/19 2003    Order Status:  Completed Specimen:  Joint Fluid from Wrist, Left Updated:  05/09/19 2109     Gram Stain Result Rare WBC's      No organisms seen    Fungus culture [[id number]] Collected:  05/09/19 2008    Order Status:  Sent Specimen:  Joint Fluid from Hand, Left Updated:   05/09/19 2028    Fungus culture [[id number]] Collected:  05/09/19 2003    Order Status:  Sent Specimen:  Joint Fluid from Wrist, Left Updated:  05/09/19 2026          Diagnostic Results:  X-ray: Erosive changes of the distal phalanx of the great toe with associated toe wound which are concerning for osteomyelitis.  MRI could be performed for further evaluation.    MRI: ordered    DISHA: 3/24  Ankle-brachial indices unable to be obtained secondary to the positioning of bilateral lower extremity bandages.    Diffuse bilateral atherosclerosis with no hemodynamically significant stenosis demonstrated in the right or left lower extremity arterial system.    Clinical Findings:  Ulcer Location: right distal hallux  Measurements:1.0x1.0x0.2  Periwound: hyperkeratotic  Drainage: scant serosanguinous  Base: granular  Signs of infection: none    Multiple LE venous stasis ulcers that are fibrogranular, no signs of infection.    5/11/19:            5/10/19:

## 2019-05-11 NOTE — ASSESSMENT & PLAN NOTE
Sherin Ortiz is a 74 y.o. female with superifical toe ulcer, no acute SOI or purulent drainage noted, appears stable.   -Imaging with OM noted, will discuss with staff  -ID consult placed  -Cultures taken previously  -Wound dressed with betadine and loose kerlix secured with tape  -nursing orders in for dressing changes  -Podiatry will follow

## 2019-05-11 NOTE — PROGRESS NOTES
[facility]  Podiatry  Progress Note    Patient Name: Sherin Ortiz  MRN: [id number]  Admission Date: 5/9/2019  Hospital Length of Stay: 0 days  Attending Physician: JADA Alba MD  Primary Care Provider: Ame Vasquez MD   Interval Hx: Pt seen at bedside, no pedal complaints at this time.    Scheduled Meds:   aspirin  81 mg Oral Daily    atorvastatin  40 mg Oral QHS    colchicine  0.6 mg Oral BID    insulin detemir U-100  18 Units Subcutaneous QHS    levoFLOXacin  750 mg Oral Daily    miconazole nitrate 2%   Topical (Top) BID    predniSONE  40 mg Oral Daily    vancomycin (VANCOCIN) IVPB  15 mg/kg Intravenous Q24H     Continuous Infusions:  PRN Meds:acetaminophen, dextrose 50%, glucagon (human recombinant), hydrALAZINE, HYDROcodone-acetaminophen, HYDROcodone-acetaminophen, insulin aspart U-100, ondansetron, polyethylene glycol, promethazine, ramelteon, sodium chloride 0.9%    Review of patient's allergies indicates:   Allergen Reactions    Penicillins Hives     Other reaction(s): Hives    Sulfa (sulfonamide antibiotics) Other (See Comments)     Shakes, pt states her doctor told her the shakes were possibly caused by an allergy to sulfa        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    Benign essential hypertension 11/14/2012    Cataract     s/p phacoemulsification    Chronic kidney disease (CKD), stage III (moderate) 9/12/2013    Diabetic peripheral neuropathy associated with type 2 diabetes mellitus 11/14/2014    causing right hemiparesis    Gait disorder     Hyperlipidemia     Iritis - Both Eyes 6/10/2013    Kidney stone     Lymphedema     Morbid obesity with BMI of 40.0-44.9, adult 2/18/2015    Nephrolithiasis 4/20/2016    NS (nuclear sclerosis) 4/1/2013    Nuclear sclerosis - Both Eyes 4/29/2013    Preseptal cellulitis - Right Eye 4/29/2013    Proliferative diabetic retinopathy - Both Eyes 4/29/2013     Proliferative diabetic retinopathy, both eyes 4/1/2013    PSC (posterior subcapsular cataract) - Both Eyes 4/29/2013    S/P hernia repair 12/19/2012    TIA (transient ischemic attack) 11/18/2014    Tinea pedis 7/24/2012    Tinea pedis is present on both feet.     Type 2 diabetes mellitus with renal manifestations, controlled 12/12/2013    Type 2 diabetes, controlled, with moderate nonproliferative diabetic retinopathy without macular edema 9/17/2015    Ulcer of left lower extremity, limited to breakdown of skin 7/8/2015    Unspecified cerebral artery occlusion with cerebral infarction 11/16/2014    Unspecified venous (peripheral) insufficiency     Ureteral stone with hydronephrosis 1/27/2016    UTI (lower urinary tract infection)     Vaginal infection     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     Past Surgical History:   Procedure Laterality Date    APPENDECTOMY      CATARACT EXTRACTION W/  INTRAOCULAR LENS IMPLANT Left 5/21/2013    CATARACT EXTRACTION W/  INTRAOCULAR LENS IMPLANT Right 6/4/2013    CHOLECYSTECTOMY      COLONOSCOPY  12/22/2005    normal    CYSTOSCOPY  4/20/2016    Performed by Oliver Duke [facility] at [facility] OR    CYSTOSCOPY WITH STENT PLACEMENT Right 1/27/2016    Performed by [facility] at [facility] OR    ESOPHAGOGASTRODUODENOSCOPY  12/21/2015    hiatal hernia, Schatzki ring    ESOPHAGOGASTRODUODENOSCOPY (EGD) N/A 6/30/2014    Performed by Jaspreet Ng MD at [facility] ENDO (2ND FLR)    EYE SURGERY Bilateral 2008    laser surgery both eyes    INSERTION, IOL PROSTHESIS Right 6/4/2013    Performed by Federico Schwartz MD at [facility] OR 1ST FLR    INSERTION, IOL PROSTHESIS Left 5/21/2013    Performed by Fed[facility]ico Schwartz MD at [facility] OR 1ST FLR    NASAL SEPTUM SURGERY      PHACOEMULSIFICATION, CATARACT Right 6/4/2013    Performed by Federico Schwartz MD at [facility] OR 1ST FLR    PHACOEMULSIFICATION, CATARACT Left 5/21/2013    Performed by Federico Schwartz,  [facility] at [facility] OR 1ST FLR    PYELOGRAM-RETROGRADE Right 2016    Performed by [facility] at [facility] OR    REMOVAL-STENT-URETERAL Right 2016    Performed by Oliver Duke [facility] at [facility] OR    SUBTOTAL COLECTOMY  2012    transverse colon, for incarcerated umbilical hernia, Dr. Kat Trujillo-Katie    URETEROSCOPY Right 2016    Performed by Oliver Duke [facility] at [facility] OR       Family History     Problem Relation (Age of Onset)    Cataracts Sister, Brother    Diabetes Sister    Heart disease Brother    Leukemia Mother        Tobacco Use    Smoking status: Former Smoker     Packs/day: 0.50     Years: 15.00     Pack years: 7.50     Types: Cigarettes     Last attempt to quit: 1982     Years since quittin.8    Smokeless tobacco: Former User    Tobacco comment: smoked one pack per week   Substance and Sexual Activity    Alcohol use: No    Drug use: No    Sexual activity: Not Currently     Review of Systems   Constitutional: Negative for chills and fever.   Cardiovascular: Negative for leg swelling.   Gastrointestinal: Negative for nausea and vomiting.   Musculoskeletal: Negative for arthralgias and joint swelling.   Skin: Positive for wound. Negative for rash.   Psychiatric/Behavioral: Negative for agitation and confusion.     Objective:     Vital Signs (Most Recent):  Temp: 98.4 °F (36.9 °C) (19)  Pulse: 74 (19)  Resp: 20 (19)  BP: 132/63 (19)  SpO2: 98 % (19) Vital Signs (24h Range):  Temp:  [97.6 °F (36.4 °C)-98.7 °F (37.1 °C)] 98.4 °F (36.9 °C)  Pulse:  [72-75] 74  Resp:  [18-20] 20  SpO2:  [95 %-98 %] 98 %  BP: (119-195)/(55-86) 132/63     Weight: 127 kg (279 lb 15.7 oz)  Body mass index is 43.85 kg/m².    Foot Exam    General  Orientation: alert and oriented to person, place, and time   Affect: appropriate       Right Foot/Ankle     Inspection and Palpation  Ecchymosis: none  Tenderness: none   Swelling: none      Neurovascular  Dorsalis pedis: 1+  Posterior tibial: 1+  Saphenous nerve sensation: diminished  Tibial nerve sensation: diminished  Superficial peroneal nerve sensation: diminished  Deep peroneal nerve sensation: diminished  Sural nerve sensation: diminished      Left Foot/Ankle      Inspection and Palpation  Ecchymosis: none  Tenderness: none   Swelling: none     Neurovascular  Dorsalis pedis: 1+  Posterior tibial: 1+  Saphenous nerve sensation: diminished  Tibial nerve sensation: diminished  Superficial peroneal nerve sensation: diminished  Deep peroneal nerve sensation: diminished  Sural nerve sensation: diminished            Laboratory:  A1C:   Recent Labs   Lab 03/22/19  1744   HGBA1C 9.8*     CBC:   Recent Labs   Lab 05/11/19  0325   WBC 6.32   RBC 3.53*   HGB 10.1*   HCT 31.4*      MCV 89   MCH 28.6   MCHC 32.2     CMP:   Recent Labs   Lab 05/11/19  0325   *   CALCIUM 9.1   *   K 4.7   CO2 22*      BUN 22   CREATININE 1.1     CRP:   Recent Labs   Lab 05/09/19  1322   CRP 98.4*     ESR:   Recent Labs   Lab 05/09/19  1322   SEDRATE 104*     Prealbumin: No results for input(s): PREALBUMIN in the last 48 hours.  Wound Cultures:   Recent Labs   Lab 03/22/19  2200 03/23/19  1119 05/09/19  2003 05/09/19  2008 05/10/19  1503   LABAERO KLEBSIELLA OXYTOCA  Moderate    MORGANELLA MORGANII  Moderate    METHICILLIN RESISTANT STAPHYLOCOCCUS AUREUS  Many    PROTEUS MIRABILIS  Few    PSEUDOMONAS AERUGINOSA  Many    PROVIDENCIA STUARTII  Many  Skin lyndsey also present    PSEUDOMONAS AERUGINOSA  Many    KLEBSIELLA OXYTOCA  Moderate   ENTEROBACTER AEROGENES  Few    METHICILLIN RESISTANT STAPHYLOCOCCUS AUREUS  Many    ENTEROBACTER CLOACAE  Few  Skin lyndsey also present   No growth No growth Insufficient incubation, culture in progress     Microbiology Results (last 7 days)     Procedure Component Value Units Date/Time    Aerobic culture [[id number]] Collected:  05/10/19 1503    Order Status:   Completed Specimen:  Wound from Toe, Right Foot Updated:  05/11/19 1334     Aerobic Bacterial Culture Insufficient incubation, culture in progress    AFB Culture & Smear [[id number]] Collected:  05/09/19 2003    Order Status:  Completed Specimen:  Joint Fluid from Wrist, Left Updated:  05/10/19 2127     AFB Culture & Smear Culture in progress     AFB CULTURE STAIN No acid fast bacilli seen.    AFB Culture & Smear [[id number]] Collected:  05/09/19 2008    Order Status:  Completed Specimen:  Joint Fluid from Hand, Left Updated:  05/10/19 2127     AFB Culture & Smear Culture in progress     AFB CULTURE STAIN No acid fast bacilli seen.    Urine culture [[id number]] Collected:  05/10/19 1630    Order Status:  No result Specimen:  Urine Updated:  05/10/19 2105    Culture, Anaerobe [[id number]] Collected:  05/10/19 1503    Order Status:  Sent Specimen:  Wound from Toe, Right Foot Updated:  05/10/19 1716    Culture, Anaerobic [[id number]] Collected:  05/09/19 2008    Order Status:  Completed Specimen:  Joint Fluid from Hand, Left Updated:  05/10/19 0737     Anaerobic Culture Culture in progress    Culture, Anaerobic [[id number]] Collected:  05/09/19 2003    Order Status:  Completed Specimen:  Joint Fluid from Wrist, Left Updated:  05/10/19 0737     Anaerobic Culture Culture in progress    Aerobic culture [[id number]] Collected:  05/09/19 2003    Order Status:  Completed Specimen:  Joint Fluid from Wrist, Left Updated:  05/10/19 0731     Aerobic Bacterial Culture No growth    Aerobic culture [[id number]] Collected:  05/09/19 2008    Order Status:  Completed Specimen:  Joint Fluid from Hand, Left Updated:  05/10/19 0731     Aerobic Bacterial Culture No growth    Gram stain [[id number]] Collected:  05/09/19 2008    Order Status:  Completed Specimen:  Joint Fluid from Hand, Left Updated:  05/09/19 2111     Gram Stain Result Rare WBC's      No organisms seen    Gram stain [[id number]] Collected:  05/09/19 2003    Order Status:  Completed  Specimen:  Joint Fluid from Wrist, Left Updated:  05/09/19 2109     Gram Stain Result Rare WBC's      No organisms seen    Fungus culture [[id number]] Collected:  05/09/19 2008    Order Status:  Sent Specimen:  Joint Fluid from Hand, Left Updated:  05/09/19 2028    Fungus culture [[id number]] Collected:  05/09/19 2003    Order Status:  Sent Specimen:  Joint Fluid from Wrist, Left Updated:  05/09/19 2026          Diagnostic Results:  X-ray: Erosive changes of the distal phalanx of the great toe with associated toe wound which are concerning for osteomyelitis.  MRI could be performed for further evaluation.    MRI: ordered    DISHA: 3/24  Ankle-brachial indices unable to be obtained secondary to the positioning of bilateral lower extremity bandages.    Diffuse bilateral atherosclerosis with no hemodynamically significant stenosis demonstrated in the right or left lower extremity arterial system.    Clinical Findings:  Ulcer Location: right distal hallux  Measurements:1.0x1.0x0.2  Periwound: hyperkeratotic  Drainage: scant serosanguinous  Base: granular  Signs of infection: none    Multiple LE venous stasis ulcers that are fibrogranular, no signs of infection.    5/11/19:            5/10/19:                        Assessment/Plan:     Toe ulcer  Sherin Ortiz is a 74 y.o. female with superifical toe ulcer, no acute SOI or purulent drainage noted, appears stable.   -Imaging with OM noted, will discuss with staff  -Discussed tx options for OM with ABX vs amputation with no guarantee of resolution, pt elects for abx for now  -Cultures taken previously  -ID on board, planning for ABX based on culture results  -Wound dressed with betadine and loose kerlix secured with tape  -nursing orders in for dressing changes  -Podiatry will follow    PAOD (peripheral arterial occlusive disease)  -Recommend consult to vascular sx or Interventional Cardiology.          Hyun Hurst MD  Podiatry  [facility]

## 2019-05-11 NOTE — PROGRESS NOTES
[facility] Hospital Medicine  Progress Note    Patient Name: Sherin Ortiz  MRN: [id number]  Patient Class: IP- Inpatient   Admission Date: 5/9/2019  Length of Stay: 0 days  Attending Physician: JADA Alba MD  Primary Care Provider: Ame Vasuqez MD    [facility] Medicine Team: [facility]OSP MED F Breann Singh NP    Subjective:     Principal Problem:Osteomyelitis of toe    HPI:  Ms. Ortiz is a 75 y/o F with a PMH of T2DM (A1c 9.8), HTN, HLD, and ulcers of the lower extremities (recently admitted to hospital 03/22/2019 for cellulitis of L heel ulcer, completed tx with Vancomycin 04/12/2019, followed by  wound care) who is being admitted to hospital medicine observation for L hand edema. Patient reports L hand edema with associated pain and erythema with onset early this morning. Patient reports numerous episodes of L hand edema over the past few years which the patient attributes to a blown vein received during a missed IV stick a number of years ago. However, today's episode is worse than baseline. Pain is severe, rated 10/10. No recent falls or trauma. Patient has been taking care of her bed-bound sister and having to push her sister frequently from side to side for adjustments and bed pan usage, and therefore her L hand has been weaker and more painful than baseline. Patient endorses difficulty ranging the digits on the left hand. Patient denies fever, chills, CP, SOB, N/V/D, extremity numbness, or extremity weakness.     Labs in ED notable for WBC 7.15, sed rate 104, CRP 98.4, procal 0.08, glucose 333. Vitals notable for /80 mmHg, Temp 98.6 F, pulse 80 bpm. MRI of hand and wrist: fluid distention and synovial enhancement of the 4th compartment of the extensor tendons consistent with tenosynovitis, diffuse skin soft tissue edema and enhancement especially dorsally could indicate cellulitis.  No abscess. Ortho consulted and wrist joint aspirated with analysis not concerning for  septic arthritis.  Also aspirated dorsal fluctuance and sent for culture. This was bloody fluid, not purulent. Recommend admission to medicine for IV abx and elevation in splint.     Hospital Course:  Patient admitted to hospital medicine for left hand cellulitis. Patient found to have gout. Patient seen by podiatry; MRI ordered and acute osteomyelitis. ID consulted. Awaiting recommendations     Interval History: Patient resting in bed. Minimal complaints of pain. Patient updated on MRI results and plan of care. Patient agrees with plan.     Review of Systems   Constitutional: Negative for chills and fever.   Eyes: Negative for photophobia and visual disturbance.   Respiratory: Negative for cough and shortness of breath.    Cardiovascular: Negative for chest pain, palpitations and leg swelling.   Gastrointestinal: Negative for abdominal pain, constipation and diarrhea.   Endocrine: Negative for cold intolerance and heat intolerance.   Genitourinary: Negative for dysuria and hematuria.   Musculoskeletal: Negative for myalgias.   Skin: Positive for wound (lower extermities). Negative for rash.   Neurological: Negative for weakness and headaches.   Hematological: Negative for adenopathy. Does not bruise/bleed easily.   Psychiatric/Behavioral: Negative for agitation and behavioral problems.     Objective:     Vital Signs (Most Recent):  Temp: 98.4 °F (36.9 °C) (05/11/19 0730)  Pulse: 74 (05/11/19 0730)  Resp: 20 (05/11/19 0730)  BP: 132/63 (05/11/19 0730)  SpO2: 98 % (05/11/19 0730) Vital Signs (24h Range):  Temp:  [97.6 °F (36.4 °C)-98.7 °F (37.1 °C)] 98.4 °F (36.9 °C)  Pulse:  [72-75] 74  Resp:  [18-20] 20  SpO2:  [95 %-98 %] 98 %  BP: (119-195)/(55-86) 132/63     Weight: 127 kg (279 lb 15.7 oz)  Body mass index is 43.85 kg/m².    Intake/Output Summary (Last 24 hours) at 5/11/2019 1326  Last data filed at 5/11/2019 0500  Gross per 24 hour   Intake --   Output 1000 ml   Net -1000 ml      Physical Exam   Constitutional:  She is oriented to person, place, and time. She appears well-developed and well-nourished. No distress.   HENT:   Head: Normocephalic and atraumatic.   Right Ear: External ear normal.   Left Ear: External ear normal.   Mouth/Throat: Oropharynx is clear and moist.   Eyes: Conjunctivae and EOM are normal.   Neck: Normal range of motion. Neck supple. No JVD present. No tracheal deviation present.   Cardiovascular: Normal rate, regular rhythm, normal heart sounds and intact distal pulses.   Pulmonary/Chest: Effort normal and breath sounds normal. She has no wheezes.   Abdominal: Soft. Bowel sounds are normal. She exhibits no distension. There is no tenderness. There is no guarding.   Neurological: She is alert and oriented to person, place, and time.   Skin: Skin is warm and dry. She is not diaphoretic. There is erythema.   Bilateral feet and ankles with dressings and bandages in place.L    Patient noted to have erythema in skin folds of abdomen and groin    Psychiatric: She has a normal mood and affect. Her behavior is normal. Judgment and thought content normal.       Significant Labs:   CBC:   Recent Labs   Lab 05/10/19  0321 05/11/19  0325   WBC 5.74 6.32   HGB 9.7* 10.1*   HCT 30.6* 31.4*    309     CMP:   Recent Labs   Lab 05/10/19  0321 05/11/19  0325   * 133*   K 3.9 4.7    102   CO2 24 22*   * 224*   BUN 18 22   CREATININE 1.0 1.1   CALCIUM 9.0 9.1   ANIONGAP 8 9   EGFRNONAA 54.9* 48.9*       Significant Imaging: I have reviewed all pertinent imaging results/findings within the past 24 hours.    Assessment/Plan:      * Osteomyelitis of toe  MRI findings in keeping with acute osteomyelitis involving the right great toe distal phalanx with overlying soft tissue ulcer.  - podiatry following  Dressed foot with betadine wet to dry today,  Nursing to do hydrofera MW, orders in.  -Wound care on board managing LE wounds with hydrofera, agree with hydrofera, recommend continue local wound  care  - ID consulted   - patient continues on levoquin and vanc currently; will adjust as necessary     Cellulitis of left hand  - ESR/CRP elevated on admit, VSS, WBC 7.15  - MRI of L hand and wrist consistent with tenosynovitis and cellulitis   - ortho consulted, recs appreciated:   - wrist joint aspirated with analysis not concerning for septic arthritis   - dorsal fluctuance aspirated and urate crystals noted in the fluid; prednisone initiated    - admit for IV abx, elevation in splint   - NPO after midnight as precaution  - orders placed for prn pain meds  - 2 g Ceftriaxone IV and 1750 mg Vancomycin IV administered, will continue-deescalated the rocephin     Uncontrolled type 2 diabetes mellitus with stage 3 chronic kidney disease, with long-term current use of insulin  - glucose in   - home medications: Glimepiride 1 mg 2 tablets daily at lunchtime, Lantus 35 units qhs  - hospital regimen: insulin detemir 18u qhs + mod dose SSI for NPO patients  - better controlled today    Hyperlipidemia LDL goal <100  - continue Lipitor 40 mg qd    PAOD (peripheral arterial occlusive disease)  - continue Plavix 75 mg qd, ASA 81 mg qd    Anemia of chronic disease  - Hgb on admit 10.0, patient's baseline 9.5-10.5  - monitor with daily CBC    Ulcer of heel and midfoot  - patient recently admitted to hospital 03/22/2019 for cellulitis of L heel ulcer, completed tx with Vancomycin on 04/12/2019  - followed by wound care and  wound care  - will consult wound care; assistance appreciated  - wound care concern for pseudomonas-levoquin initiated       VTE Risk Mitigation (From admission, onward)        Ordered     Place sequential compression device  Until discontinued      05/09/19 2339     IP VTE HIGH RISK PATIENT  Once      05/09/19 2339     Place AUTUMN hose  Until discontinued      05/09/19 2339              Breann Singh NP  Department of Hospital Medicine   [facility]

## 2019-05-11 NOTE — PLAN OF CARE
Problem: Adult Inpatient Plan of Care  Goal: Plan of Care Review  Alert and oriented to person, place, time, and situation.  Calm and cooperative with appropriate affect.  No acute distress noted, respirations even and unlabored.  Plan of care reviewed with patient.  Questions encouraged and answered, patient verbalizes understanding. Incontinent of urine patient did not have a bowel movement this shift.  Incontinent care performed prn throughout shift. Patient educated on skin breakdown and the importance of calling staff for assistance when she has the urge to urinate instead of relying on purewick.  She verbalizes understanding but reinforcement is needed. Patient was given a complete bath.  Appetite good. Patient initially NPO but was later given a diabetic diet due to the canceling of the procedure scheduled today per physician.   Reported pain in the left arm only with touch.  X-ray performed to foot, UA and wound cultures were collected this shift and sent to lab.  Glucose monitoring maintained this shift per orders with sliding scale insulin given x2 this shift.    Safety precautions maintained.  Will continue to monitor for any changes this shift and report to oncoming tour.      Patient has early stage of yeast infection under bilateral breast.  Yeast infection to groin area, abdominal folds, and buttocks (down the middle) with small tear under gluteal fold.  All areas cleaned with soap and water, pat dry, and antifungal cream applied.  Patient also has several wounds to her bilateral lower extremities which where assessed by wound care cleaned and dressed (refer to wound care note).  New dressing orders were placed.  Patient also has a dressing to left hand which has been positioned straight up.

## 2019-05-11 NOTE — HPI
74 yro F with PMH of IDDM2 (AIC 9.8), HTN, HLD, chronic LLE ulcers (admitted [facility] 3/22 for cellulitis, completed vanc and meropenem abx course 4/12), and CKD3 who presents to [facility] 5/9/19 with the complaint of L hand edema and pain. Patient reports chronic, intermittent L hand edema that she attributes to a blown vein from a failed needle sick years ago. The day prior to admission, the edema worsened and became associated with erythema and pain. 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 MRI completed 5/9 of L hand showed cellulitis and an area of dorsal fluctuance associated with the 4th extensor compartment. Patient was thus started on abx with vanc and rocephin 5/9. She was evaluated by orthopedics and underwent aspiration of the fluctuance, which showed no septic arthritis, but did show urate crystals consistent with gout and was therefore started on prednisone. On admission, wound care was consulted to evalulate her chronic LLE ulcers, as well as new RLE ulcer on great toe. Patient reports the LLE ulcers are chronic and since she completed her abx course in April with home health, the ulcers have been improving and not causing her any issues. About a week ago, however, she noticed a new ulcer on her R toe, but denies any pain associated with it. Wound care was concerned about pseudomonal infection of the toe and her abx were thus changed to vanc and levaquin on 5/10. Podiatry has evaluated patient and obtained a culture of the R great toe. MRI of R foot was completed 5/10 that showed osteomyelitis. She reports her L hand pain and edema is improving. She denies fevers, chills, or any other complaints at this time.

## 2019-05-11 NOTE — SUBJECTIVE & OBJECTIVE
"Principal Problem:Cellulitis of left hand    Principal Orthopedic Problem: same    Interval History: Patient seen and examined at bedside.  No acute events overnight.  Pain controlled.  Hand pain and swelling has resolved. Hung overnight.       Review of patient's allergies indicates:   Allergen Reactions    Penicillins Hives     Other reaction(s): Hives    Sulfa (sulfonamide antibiotics) Other (See Comments)     Shakes, pt states her doctor told her the shakes were possibly caused by an allergy to sulfa       Current Facility-Administered Medications   Medication    acetaminophen tablet 650 mg    aspirin chewable tablet 81 mg    atorvastatin tablet 40 mg    dextrose 50% injection 12.5 g    glucagon (human recombinant) injection 1 mg    hydrALAZINE tablet 10 mg    HYDROcodone-acetaminophen  mg per tablet 1 tablet    HYDROcodone-acetaminophen 5-325 mg per tablet 1 tablet    insulin aspart U-100 pen 1-10 Units    insulin detemir U-100 pen 18 Units    levoFLOXacin tablet 750 mg    miconazole nitrate 2% ointment    ondansetron injection 8 mg    polyethylene glycol packet 17 g    predniSONE tablet 40 mg    promethazine suppository 25 mg    ramelteon tablet 8 mg    sodium chloride 0.9% flush 10 mL    vancomycin 2 g in 0.9% sodium chloride 500 mL IVPB     Objective:     Vital Signs (Most Recent):  Temp: 98.7 °F (37.1 °C) (05/11/19 0500)  Pulse: 72 (05/11/19 0500)  Resp: 18 (05/11/19 0500)  BP: (!) 119/55 (05/11/19 0500)  SpO2: 96 % (05/11/19 0500) Vital Signs (24h Range):  Temp:  [97.8 °F (36.6 °C)-98.7 °F (37.1 °C)] 98.7 °F (37.1 °C)  Pulse:  [70-73] 72  Resp:  [18] 18  SpO2:  [96 %-98 %] 96 %  BP: (119-180)/(55-85) 119/55     Weight: 127 kg (279 lb 15.7 oz)  Height: 5' 7" (170.2 cm)  Body mass index is 43.85 kg/m².      Intake/Output Summary (Last 24 hours) at 5/11/2019 0659  Last data filed at 5/11/2019 0500  Gross per 24 hour   Intake --   Output 1700 ml   Net -1700 ml       Ortho/SPM " Exam    AAOx4  NAD  Reg rate  No increased WOB   decrease in dorsal swelling  No volar pain  SILT M/R/U  AIN/PIN/U with weakness  WWP extremities  FCDs in place and functioning    Significant Labs:   CBC:   Recent Labs   Lab 05/09/19  1322 05/10/19  0321 05/11/19  0325   WBC 7.15 5.74 6.32   HGB 10.0* 9.7* 10.1*   HCT 31.2* 30.6* 31.4*    288 309     CMP:   Recent Labs   Lab 05/09/19  1322 05/10/19  0321 05/11/19  0325   * 135* 133*   K 4.5 3.9 4.7    103 102   CO2 23 24 22*   * 162* 224*   BUN 21 18 22   CREATININE 1.3 1.0 1.1   CALCIUM 9.1 9.0 9.1   ANIONGAP 8 8 9   EGFRNONAA 39.9* 54.9* 48.9*     CRP:   Recent Labs   Lab 05/09/19  1322   CRP 98.4*     All pertinent labs within the past 24 hours have been reviewed.    Significant Imaging: I have reviewed all pertinent imaging results/findings.

## 2019-05-11 NOTE — PROGRESS NOTES
[facility]  Orthopedics  Progress Note    Patient Name: Sherin Ortiz  MRN: [id number]  Admission Date: 5/9/2019  Hospital Length of Stay: 0 days  Attending Provider: JADA Alba MD  Primary Care Provider: Ame Vasquez MD  Follow-up For: Procedure(s) (LRB):  INCISION AND DRAINAGE, HAND - dorsal wrist - possible arthrotomy - hand pan - hand table on stretcher - hand drapes - cysto tubing - cultures - 4x15in splint materials (Left)    Post-Operative Day: 1 Day Post-Op  Subjective:     Principal Problem:Cellulitis of left hand    Principal Orthopedic Problem: same    Interval History: Patient seen and examined at bedside.  No acute events overnight.  Pain controlled.  Hand pain and swelling has resolved. Hung overnight.       Review of patient's allergies indicates:   Allergen Reactions    Penicillins Hives     Other reaction(s): Hives    Sulfa (sulfonamide antibiotics) Other (See Comments)     Shakes, pt states her doctor told her the shakes were possibly caused by an allergy to sulfa       Current Facility-Administered Medications   Medication    acetaminophen tablet 650 mg    aspirin chewable tablet 81 mg    atorvastatin tablet 40 mg    dextrose 50% injection 12.5 g    glucagon (human recombinant) injection 1 mg    hydrALAZINE tablet 10 mg    HYDROcodone-acetaminophen  mg per tablet 1 tablet    HYDROcodone-acetaminophen 5-325 mg per tablet 1 tablet    insulin aspart U-100 pen 1-10 Units    insulin detemir U-100 pen 18 Units    levoFLOXacin tablet 750 mg    miconazole nitrate 2% ointment    ondansetron injection 8 mg    polyethylene glycol packet 17 g    predniSONE tablet 40 mg    promethazine suppository 25 mg    ramelteon tablet 8 mg    sodium chloride 0.9% flush 10 mL    vancomycin 2 g in 0.9% sodium chloride 500 mL IVPB     Objective:     Vital Signs (Most Recent):  Temp: 98.7 °F (37.1 °C) (05/11/19 0500)  Pulse: 72 (05/11/19 0500)  Resp: 18 (05/11/19  "0500)  BP: (!) 119/55 (05/11/19 0500)  SpO2: 96 % (05/11/19 0500) Vital Signs (24h Range):  Temp:  [97.8 °F (36.6 °C)-98.7 °F (37.1 °C)] 98.7 °F (37.1 °C)  Pulse:  [70-73] 72  Resp:  [18] 18  SpO2:  [96 %-98 %] 96 %  BP: (119-180)/(55-85) 119/55     Weight: 127 kg (279 lb 15.7 oz)  Height: 5' 7" (170.2 cm)  Body mass index is 43.85 kg/m².      Intake/Output Summary (Last 24 hours) at 5/11/2019 0659  Last data filed at 5/11/2019 0500  Gross per 24 hour   Intake --   Output 1700 ml   Net -1700 ml       Ortho/SPM Exam    AAOx4  NAD  Reg rate  No increased WOB   decrease in dorsal swelling  No volar pain  SILT M/R/U  AIN/PIN/U with weakness  WWP extremities  FCDs in place and functioning    Significant Labs:   CBC:   Recent Labs   Lab 05/09/19  1322 05/10/19  0321 05/11/19  0325   WBC 7.15 5.74 6.32   HGB 10.0* 9.7* 10.1*   HCT 31.2* 30.6* 31.4*    288 309     CMP:   Recent Labs   Lab 05/09/19  1322 05/10/19  0321 05/11/19  0325   * 135* 133*   K 4.5 3.9 4.7    103 102   CO2 23 24 22*   * 162* 224*   BUN 21 18 22   CREATININE 1.3 1.0 1.1   CALCIUM 9.1 9.0 9.1   ANIONGAP 8 8 9   EGFRNONAA 39.9* 54.9* 48.9*     CRP:   Recent Labs   Lab 05/09/19  1322   CRP 98.4*     All pertinent labs within the past 24 hours have been reviewed.    Significant Imaging: I have reviewed all pertinent imaging results/findings.    Assessment/Plan:     * Cellulitis of left hand  74F with L hand cellulitis, now resolved. .  Wrist joint aspirated with analysis not concerning for septic arthritis.  Also aspirated dorsal fluctuance and sent for culture.  This was bloody fluid, not purulent.     No concern for septic joint, pain, erythema resolved. Ok for dc from ortho standpoint.           Oliver Rogers MD  Orthopedics  [facility]  "

## 2019-05-11 NOTE — PROGRESS NOTES
met with patient at bedside regarding therapy recommendation for skilled nursing facility (SNF) placement.  Patient refused placement and request to resume home health with [facility].   Patient/Caregiver provided printed discharge information.

## 2019-05-11 NOTE — SUBJECTIVE & OBJECTIVE
Interval History: Patient resting in bed. Minimal complaints of pain. Patient updated on MRI results and plan of care. Patient agrees with plan.     Review of Systems   Constitutional: Negative for chills and fever.   Eyes: Negative for photophobia and visual disturbance.   Respiratory: Negative for cough and shortness of breath.    Cardiovascular: Negative for chest pain, palpitations and leg swelling.   Gastrointestinal: Negative for abdominal pain, constipation and diarrhea.   Endocrine: Negative for cold intolerance and heat intolerance.   Genitourinary: Negative for dysuria and hematuria.   Musculoskeletal: Negative for myalgias.   Skin: Positive for wound (lower extermities). Negative for rash.   Neurological: Negative for weakness and headaches.   Hematological: Negative for adenopathy. Does not bruise/bleed easily.   Psychiatric/Behavioral: Negative for agitation and behavioral problems.     Objective:     Vital Signs (Most Recent):  Temp: 98.4 °F (36.9 °C) (05/11/19 0730)  Pulse: 74 (05/11/19 0730)  Resp: 20 (05/11/19 0730)  BP: 132/63 (05/11/19 0730)  SpO2: 98 % (05/11/19 0730) Vital Signs (24h Range):  Temp:  [97.6 °F (36.4 °C)-98.7 °F (37.1 °C)] 98.4 °F (36.9 °C)  Pulse:  [72-75] 74  Resp:  [18-20] 20  SpO2:  [95 %-98 %] 98 %  BP: (119-195)/(55-86) 132/63     Weight: 127 kg (279 lb 15.7 oz)  Body mass index is 43.85 kg/m².    Intake/Output Summary (Last 24 hours) at 5/11/2019 1326  Last data filed at 5/11/2019 0500  Gross per 24 hour   Intake --   Output 1000 ml   Net -1000 ml      Physical Exam   Constitutional: She is oriented to person, place, and time. She appears well-developed and well-nourished. No distress.   HENT:   Head: Normocephalic and atraumatic.   Right Ear: External ear normal.   Left Ear: External ear normal.   Mouth/Throat: Oropharynx is clear and moist.   Eyes: Conjunctivae and EOM are normal.   Neck: Normal range of motion. Neck supple. No JVD present. No tracheal deviation present.    Cardiovascular: Normal rate, regular rhythm, normal heart sounds and intact distal pulses.   Pulmonary/Chest: Effort normal and breath sounds normal. She has no wheezes.   Abdominal: Soft. Bowel sounds are normal. She exhibits no distension. There is no tenderness. There is no guarding.   Neurological: She is alert and oriented to person, place, and time.   Skin: Skin is warm and dry. She is not diaphoretic. There is erythema.   Bilateral feet and ankles with dressings and bandages in place.L    Patient noted to have erythema in skin folds of abdomen and groin    Psychiatric: She has a normal mood and affect. Her behavior is normal. Judgment and thought content normal.       Significant Labs:   CBC:   Recent Labs   Lab 05/10/19  0321 05/11/19  0325   WBC 5.74 6.32   HGB 9.7* 10.1*   HCT 30.6* 31.4*    309     CMP:   Recent Labs   Lab 05/10/19  0321 05/11/19  0325   * 133*   K 3.9 4.7    102   CO2 24 22*   * 224*   BUN 18 22   CREATININE 1.0 1.1   CALCIUM 9.0 9.1   ANIONGAP 8 9   EGFRNONAA 54.9* 48.9*       Significant Imaging: I have reviewed all pertinent imaging results/findings within the past 24 hours.

## 2019-05-11 NOTE — CONSULTS
[facility]  Infectious Disease  Consult Note    Patient Name: Sherin Ortiz  MRN: [id number]  Admission Date: 5/9/2019  Hospital Length of Stay: 0 days  Attending Physician: JADA Alba MD  Primary Care Provider: Ame Vasquez MD     Isolation Status: No active isolations    Patient information was obtained from patient, past medical records and ER records.      Inpatient consult to Infectious Diseases  Consult performed by: Elizabeth Day MD  Consult ordered by: Breann Singh NP        Assessment/Plan:     * Osteomyelitis of toe  74 yro F with PMH of IDDM2 (AIC 9.8), HTN, chronic LLE ulcers (admitted [facility] 3/22 for cellulitis, completed vanc and meropenem course 4/12), and CKD3 who presents to [facility] 5/9/19 with L hand edema and pain 2/2 cellulitis and gout, started on prednisone. She has chronic LLE ulcers no longer causing her any active issues, as well as new RLE ulcer on great toe present for 1 week. Podiatry evaluated R great toe and obtained a culture, as well as an MRI 5/10, which showed osteomyelitis.     -previously treated for cellulitis of chronic LLE ulcers (Wound Cx polymicrobial: MRSA, enterobacter aerogenes, enterobacter cloacae, pseudomonas, morganella, proteus, klebsiella) during hospital admission in March, finished vanc and mo course 4/12. At risk for multi-drug resistant organism.  -started on vanc and rocephin 5/9 on admission, rocephin switched to Levaquin 5/10  -continue vanc and Levaquin and FU R toe wound culture obtained by podiatry 5/10, and can tailor abx pending Cx results  -FU podiatry recomendations        Thank you for your consult. I will follow-up with patient. Please contact us if you have any additional questions.    Elizabeth Day MD  Infectious Disease  [facility]    Subjective:     Principal Problem: Osteomyelitis of toe    HPI: 74 yro F with PMH of IDDM2 (AIC 9.8), HTN, HLD, chronic LLE ulcers (admitted [facility] 3/22 for  cellulitis, completed vanc and meropenem abx course 4/12), and CKD3 who [facility] with the complaint of L hand edema and pain. Patient reports chronic, intermittent L hand edema that she attributes to a blown vein from a failed needle sick years ago. The day prior to admission, the edema worsened and became associated with erythema and pain. 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 MRI completed 5/9 of L hand showed cellulitis and an area of dorsal fluctuance associated with the 4th extensor compartment. Patient was thus started on abx with vanc and rocephin 5/9. She was evaluated by orthopedics and underwent aspiration of the fluctuance, which showed no septic arthritis, but did show urate crystals consistent with gout and was therefore started on prednisone. On admission, wound care was consulted to evalulate her chronic LLE ulcers, as well as new RLE ulcer on great toe. Patient reports the LLE ulcers are chronic and since she completed her abx course in April with home health, the ulcers have been improving and not causing her any issues. About a week ago, however, she noticed a new ulcer on her R toe, but denies any pain associated with it. Wound care was concerned about pseudomonal infection of the toe and her abx were thus changed to vanc and levaquin on 5/10. Podiatry has evaluated patient and obtained a culture of the R great toe. MRI of R foot was completed 5/10 that showed osteomyelitis. She reports her L hand pain and edema is improving. She denies fevers, chills, or any other complaints at this time.    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    Benign essential hypertension 11/14/2012    Cataract     s/p phacoemulsification    Chronic kidney disease (CKD), stage III (moderate) 9/12/2013    Diabetic peripheral  neuropathy associated with type 2 diabetes mellitus 11/14/2014    causing right hemiparesis    Gait disorder     Hyperlipidemia     Iritis - Both Eyes 6/10/2013    Kidney stone     Lymphedema     Morbid obesity with BMI of 40.0-44.9, adult 2/18/2015    Nephrolithiasis 4/20/2016    NS (nuclear sclerosis) 4/1/2013    Nuclear sclerosis - Both Eyes 4/29/2013    Preseptal cellulitis - Right Eye 4/29/2013    Proliferative diabetic retinopathy - Both Eyes 4/29/2013    Proliferative diabetic retinopathy, both eyes 4/1/2013    PSC (posterior subcapsular cataract) - Both Eyes 4/29/2013    S/P hernia repair 12/19/2012    TIA (transient ischemic attack) 11/18/2014    Tinea pedis 7/24/2012    Tinea pedis is present on both feet.    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       Past Surgical History:   Procedure Laterality Date    APPENDECTOMY      CATARACT EXTRACTION W/  INTRAOCULAR LENS IMPLANT Left 5/21/2013    CATARACT EXTRACTION W/  INTRAOCULAR LENS IMPLANT Right 6/4/2013    CHOLECYSTECTOMY      COLONOSCOPY  12/22/2005    normal    CYSTOSCOPY  4/20/2016    [facility]    CYSTOSCOPY WITH STENT PLACEMENT Right 1/27/2016    [facility]    ESOPHAGOGASTRODUODENOSCOPY  12/21/2015    hiatal hernia, Schatzki ring    ESOPHAGOGASTRODUODENOSCOPY (EGD) N/A 6/30/2014    [facility] ([facility] F[facility]    EYE SURGERY  "Bilateral 2008    laser surgery both eyes    INSERTION, IOL PROSTHESIS Right 6/4/2013    Performed [facility] OR 1ST FLR    INSERTION, IOL PROSTHESIS Left 5/21/2013    [facility] OR 1ST FLR    NASAL SEPTUM SURGERY      PHACOEMULSIFICATION, CATARACT Right 6/4/2013    [facility] OR 1ST FLR    PHACOEMULSIFICATION, CATARACT Left 5/21/2013    [facility] OR 1ST FLR    PYELOGRAM-RETROGRADE Right 4/20/2016    [facility]    REMOVAL-STENT-URETERAL Right 4/20/2016    [facility]    SUBTOTAL COLECTOMY  12/13/2012    transverse colon, for incarcerated umbilical hernia, Dr. Kat Trujillo-Katie    URETEROSCOPY Right 4/20/2016    [facility] at [facility] OR       Review of patient's allergies indicates:   Allergen Reactions    Penicillins Hives     Other reaction(s): Hives    Sulfa (sulfonamide antibiotics) Other (See Comments)     Shakes, pt states her doctor told her the shakes were possibly caused by an allergy to sulfa       Medications:  Medications Prior to Admission   Medication Sig    ACCU-CHEK RAMIRO PLUS TEST STRP Strp TEST TWICE DAILY    ACCU-CHEK SOFTCLIX LANCETS Misc Test twice daily    aspirin 81 MG Chew Take 81 mg by mouth once daily.      atorvastatin (LIPITOR) 40 MG tablet TAKE 1 TABLET EVERY EVENING    BD INSULIN SYRINGE ULTRA-FINE 1/2 mL 30 gauge x 1/2" Syrg USE EVERY NIGHT    clopidogrel (PLAVIX) 75 mg tablet TAKE 1 TABLET EVERY DAY    LANTUS U-100 INSULIN 100 unit/mL injection INJECT 7 TO 10 UNITS SUBCUTANEOUSLY IN THE EVENING DEPENDING ON SCALE (DISCARD EACH VIAL AFTER 28 DAYS) (Patient taking differently: INJECT 25 UNITS SUBCUTANEOUSLY IN THE EVENING DEPENDING ON SCALE (DISCARD EACH VIAL AFTER 28 DAYS))     Antibiotics (From admission, onward)    Start     Stop Route " Frequency Ordered    05/10/19 2300  vancomycin 2 g in 0.9% sodium chloride 500 mL IVPB  (vancomycin IVPB)      -- IV Every 24 hours (non-standard times) 05/10/19 1222    05/10/19 1230  levoFLOXacin tablet 750 mg       0859 Oral Daily 05/10/19 1222        Antifungals (From admission, onward)    Start     Stop Route Frequency Ordered    05/10/19 2100  miconazole nitrate 2% ointment      -- Top 2 times daily 05/10/19 1222        Antivirals (From admission, onward)    None           Immunization History   Administered Date(s) Administered    Influenza 2008, 2010, 2010, 2011    Influenza - High Dose 2012, 2013, 2014, 2015, 2016, 10/09/2017    Pneumococcal Conjugate - 13 Valent 2016, 12/15/2017    Pneumococcal Polysaccharide - 23 Valent 12/10/2008    Tdap 12/15/2016       Family History     Problem Relation (Age of Onset)    Cataracts Sister, Brother    Diabetes Sister    Heart disease Brother    Leukemia Mother        Social History     Socioeconomic History    Marital status:      Spouse name: Not on file    Number of children: Not on file    Years of education: Not on file    Highest education level: Not on file   Occupational History    Not on file   Social Needs    Financial resource strain: Not on file    Food insecurity:     Worry: Not on file     Inability: Not on file    Transportation needs:     Medical: Not on file     Non-medical: Not on file   Tobacco Use    Smoking status: Former Smoker     Packs/day: 0.50     Years: 15.00     Pack years: 7.50     Types: Cigarettes     Last attempt to quit: 1982     Years since quittin.8    Smokeless tobacco: Former User    Tobacco comment: smoked one pack per week   Substance and Sexual Activity    Alcohol use: No    Drug use: No    Sexual activity: Not Currently   Lifestyle    Physical activity:     Days per week: Not on file     Minutes per session: Not on file    Stress:  Not on file   Relationships    Social connections:     Talks on phone: Not on file     Gets together: Not on file     Attends [employer] service: Not on file     Active member of club or organization: Not on file     Attends meetings of clubs or organizations: Not on file     Relationship status: Not on file   Other Topics Concern    Not on file   Social History Narrative    Not on file     Review of Systems   Constitutional: Negative for chills and fever.   HENT: Negative for congestion and rhinorrhea.    Eyes: Negative for photophobia and visual disturbance.   Respiratory: Negative for cough and shortness of breath.    Cardiovascular: Negative for chest pain, palpitations and leg swelling.   Gastrointestinal: Negative for abdominal pain, constipation and diarrhea.   Endocrine: Negative for cold intolerance and heat intolerance.   Genitourinary: Negative for dysuria and hematuria.   Musculoskeletal: Negative for myalgias.   Skin: Positive for wound (lower extermities). Negative for rash.   Neurological: Negative for weakness and headaches.   Hematological: Negative for adenopathy. Does not bruise/bleed easily.   Psychiatric/Behavioral: Negative for agitation and behavioral problems.     Objective:     Vital Signs (Most Recent):  Temp: 98.4 °F (36.9 °C) (05/11/19 0730)  Pulse: 74 (05/11/19 0730)  Resp: 20 (05/11/19 0730)  BP: 132/63 (05/11/19 0730)  SpO2: 98 % (05/11/19 0730) Vital Signs (24h Range):  Temp:  [97.6 °F (36.4 °C)-98.7 °F (37.1 °C)] 98.4 °F (36.9 °C)  Pulse:  [72-75] 74  Resp:  [18-20] 20  SpO2:  [95 %-98 %] 98 %  BP: (119-195)/(55-86) 132/63     Weight: 127 kg (279 lb 15.7 oz)  Body mass index is 43.85 kg/m².    Estimated Creatinine Clearance: 60.3 mL/min (based on SCr of 1.1 mg/dL).    Physical Exam   Constitutional: She is oriented to person, place, and time. She appears well-developed and well-nourished. No distress.   HENT:   Head: Normocephalic and atraumatic.   Eyes: Pupils are equal, round,  and reactive to light. EOM are normal. No scleral icterus.   Neck: Neck supple.   Cardiovascular: Normal rate, regular rhythm, normal heart sounds and intact distal pulses.   No murmur heard.  Pulmonary/Chest: Effort normal and breath sounds normal. No respiratory distress. She has no wheezes. She has no rales.   Abdominal: Soft. Bowel sounds are normal. She exhibits no distension. There is no tenderness.   Musculoskeletal: She exhibits edema (L hand edematous, mildly TTP). She exhibits no tenderness or deformity.   R and L lower extremities wrapped with bandages   Lymphadenopathy:     She has no cervical adenopathy.   Neurological: She is alert and oriented to person, place, and time.   Skin: Skin is warm and dry.   Psychiatric: She has a normal mood and affect. Her behavior is normal.       Significant Labs:   CBC:   Recent Labs   Lab 05/09/19  1322 05/10/19  0321 05/11/19  0325   WBC 7.15 5.74 6.32   HGB 10.0* 9.7* 10.1*   HCT 31.2* 30.6* 31.4*    288 309     CMP:   Recent Labs   Lab 05/09/19  1322 05/10/19  0321 05/11/19  0325   * 135* 133*   K 4.5 3.9 4.7    103 102   CO2 23 24 22*   * 162* 224*   BUN 21 18 22   CREATININE 1.3 1.0 1.1   CALCIUM 9.1 9.0 9.1   ANIONGAP 8 8 9   EGFRNONAA 39.9* 54.9* 48.9*       Significant Imaging: I have reviewed all pertinent imaging results/findings within the past 24 hours.

## 2019-05-11 NOTE — ASSESSMENT & PLAN NOTE
74F with L hand cellulitis, now resolved. .  Wrist joint aspirated with analysis not concerning for septic arthritis.  Also aspirated dorsal fluctuance and sent for culture.  This was bloody fluid, not purulent.     No concern for septic joint, pain, erythema resolved. Ok for dc from ortho standpoint.

## 2019-05-12 LAB
ANION GAP SERPL CALC-SCNC: 9 MMOL/L (ref 8–16)
BACTERIA UR CULT: NORMAL
BASOPHILS # BLD AUTO: 0 K/UL (ref 0–0.2)
BASOPHILS NFR BLD: 0 % (ref 0–1.9)
BUN SERPL-MCNC: 30 MG/DL (ref 8–23)
CALCIUM SERPL-MCNC: 9.1 MG/DL (ref 8.7–10.5)
CHLORIDE SERPL-SCNC: 104 MMOL/L (ref 95–110)
CO2 SERPL-SCNC: 23 MMOL/L (ref 23–29)
CREAT SERPL-MCNC: 1.2 MG/DL (ref 0.5–1.4)
DIFFERENTIAL METHOD: ABNORMAL
EOSINOPHIL # BLD AUTO: 0 K/UL (ref 0–0.5)
EOSINOPHIL NFR BLD: 0 % (ref 0–8)
ERYTHROCYTE [DISTWIDTH] IN BLOOD BY AUTOMATED COUNT: 14.3 % (ref 11.5–14.5)
EST. GFR  (AFRICAN AMERICAN): 50.7 ML/MIN/1.73 M^2
EST. GFR  (NON AFRICAN AMERICAN): 44 ML/MIN/1.73 M^2
GLUCOSE SERPL-MCNC: 248 MG/DL (ref 70–110)
HCT VFR BLD AUTO: 32.7 % (ref 37–48.5)
HGB BLD-MCNC: 10.4 G/DL (ref 12–16)
IMM GRANULOCYTES # BLD AUTO: 0.01 K/UL (ref 0–0.04)
IMM GRANULOCYTES NFR BLD AUTO: 0.2 % (ref 0–0.5)
LYMPHOCYTES # BLD AUTO: 1.2 K/UL (ref 1–4.8)
LYMPHOCYTES NFR BLD: 21.3 % (ref 18–48)
MCH RBC QN AUTO: 28.6 PG (ref 27–31)
MCHC RBC AUTO-ENTMCNC: 31.8 G/DL (ref 32–36)
MCV RBC AUTO: 90 FL (ref 82–98)
MONOCYTES # BLD AUTO: 0.5 K/UL (ref 0.3–1)
MONOCYTES NFR BLD: 9 % (ref 4–15)
NEUTROPHILS # BLD AUTO: 4 K/UL (ref 1.8–7.7)
NEUTROPHILS NFR BLD: 69.5 % (ref 38–73)
NRBC BLD-RTO: 0 /100 WBC
PLATELET # BLD AUTO: 322 K/UL (ref 150–350)
PMV BLD AUTO: 9.2 FL (ref 9.2–12.9)
POCT GLUCOSE: 255 MG/DL (ref 70–110)
POCT GLUCOSE: 256 MG/DL (ref 70–110)
POCT GLUCOSE: 272 MG/DL (ref 70–110)
POCT GLUCOSE: 309 MG/DL (ref 70–110)
POCT GLUCOSE: 320 MG/DL (ref 70–110)
POCT GLUCOSE: 343 MG/DL (ref 70–110)
POTASSIUM SERPL-SCNC: 4.2 MMOL/L (ref 3.5–5.1)
RBC # BLD AUTO: 3.64 M/UL (ref 4–5.4)
SODIUM SERPL-SCNC: 136 MMOL/L (ref 136–145)
WBC # BLD AUTO: 5.68 K/UL (ref 3.9–12.7)

## 2019-05-12 PROCEDURE — 99232 SBSQ HOSP IP/OBS MODERATE 35: CPT | Mod: HCNC,,, | Performed by: NURSE PRACTITIONER

## 2019-05-12 PROCEDURE — 63600175 PHARM REV CODE 636 W HCPCS: Mod: HCNC | Performed by: NURSE PRACTITIONER

## 2019-05-12 PROCEDURE — 99232 SBSQ HOSP IP/OBS MODERATE 35: CPT | Mod: HCNC,GC,, | Performed by: INTERNAL MEDICINE

## 2019-05-12 PROCEDURE — S5571 INSULIN DISPOS PEN 3 ML: HCPCS | Mod: HCNC | Performed by: NURSE PRACTITIONER

## 2019-05-12 PROCEDURE — 80048 BASIC METABOLIC PNL TOTAL CA: CPT | Mod: HCNC

## 2019-05-12 PROCEDURE — 99232 PR SUBSEQUENT HOSPITAL CARE,LEVL II: ICD-10-PCS | Mod: HCNC,,, | Performed by: NURSE PRACTITIONER

## 2019-05-12 PROCEDURE — 99232 PR SUBSEQUENT HOSPITAL CARE,LEVL II: ICD-10-PCS | Mod: HCNC,GC,, | Performed by: INTERNAL MEDICINE

## 2019-05-12 PROCEDURE — 93010 EKG 12-LEAD: ICD-10-PCS | Mod: HCNC,,, | Performed by: INTERNAL MEDICINE

## 2019-05-12 PROCEDURE — 20600001 HC STEP DOWN PRIVATE ROOM: Mod: HCNC

## 2019-05-12 PROCEDURE — 93041 RHYTHM ECG TRACING: CPT | Mod: HCNC

## 2019-05-12 PROCEDURE — 36415 COLL VENOUS BLD VENIPUNCTURE: CPT | Mod: HCNC

## 2019-05-12 PROCEDURE — 93010 ELECTROCARDIOGRAM REPORT: CPT | Mod: HCNC,,, | Performed by: INTERNAL MEDICINE

## 2019-05-12 PROCEDURE — 93005 ELECTROCARDIOGRAM TRACING: CPT | Mod: HCNC

## 2019-05-12 PROCEDURE — 85025 COMPLETE CBC W/AUTO DIFF WBC: CPT | Mod: HCNC

## 2019-05-12 PROCEDURE — 25000003 PHARM REV CODE 250: Mod: HCNC | Performed by: PHYSICIAN ASSISTANT

## 2019-05-12 PROCEDURE — 25000003 PHARM REV CODE 250: Mod: HCNC | Performed by: NURSE PRACTITIONER

## 2019-05-12 RX ADMIN — VANCOMYCIN HYDROCHLORIDE 2000 MG: 100 INJECTION, POWDER, LYOPHILIZED, FOR SOLUTION INTRAVENOUS at 11:05

## 2019-05-12 RX ADMIN — HYDROCODONE BITARTRATE AND ACETAMINOPHEN 1 TABLET: 10; 325 TABLET ORAL at 11:05

## 2019-05-12 RX ADMIN — INSULIN ASPART 4 UNITS: 100 INJECTION, SOLUTION INTRAVENOUS; SUBCUTANEOUS at 12:05

## 2019-05-12 RX ADMIN — ATORVASTATIN CALCIUM 40 MG: 20 TABLET, FILM COATED ORAL at 09:05

## 2019-05-12 RX ADMIN — PREDNISONE 40 MG: 20 TABLET ORAL at 11:05

## 2019-05-12 RX ADMIN — MICONAZOLE NITRATE: 20 OINTMENT TOPICAL at 09:05

## 2019-05-12 RX ADMIN — COLCHICINE 0.6 MG: 0.6 TABLET, FILM COATED ORAL at 11:05

## 2019-05-12 RX ADMIN — INSULIN ASPART 6 UNITS: 100 INJECTION, SOLUTION INTRAVENOUS; SUBCUTANEOUS at 05:05

## 2019-05-12 RX ADMIN — COLCHICINE 0.6 MG: 0.6 TABLET, FILM COATED ORAL at 09:05

## 2019-05-12 RX ADMIN — INSULIN DETEMIR 18 UNITS: 100 INJECTION, SOLUTION SUBCUTANEOUS at 09:05

## 2019-05-12 RX ADMIN — INSULIN ASPART 3 UNITS: 100 INJECTION, SOLUTION INTRAVENOUS; SUBCUTANEOUS at 11:05

## 2019-05-12 RX ADMIN — INSULIN ASPART 6 UNITS: 100 INJECTION, SOLUTION INTRAVENOUS; SUBCUTANEOUS at 11:05

## 2019-05-12 RX ADMIN — VANCOMYCIN HYDROCHLORIDE 2000 MG: 100 INJECTION, POWDER, LYOPHILIZED, FOR SOLUTION INTRAVENOUS at 12:05

## 2019-05-12 RX ADMIN — INSULIN ASPART 5 UNITS: 100 INJECTION, SOLUTION INTRAVENOUS; SUBCUTANEOUS at 07:05

## 2019-05-12 RX ADMIN — INSULIN DETEMIR 5 UNITS: 100 INJECTION, SOLUTION SUBCUTANEOUS at 07:05

## 2019-05-12 RX ADMIN — LEVOFLOXACIN 750 MG: 750 TABLET, FILM COATED ORAL at 11:05

## 2019-05-12 RX ADMIN — ASPIRIN 81 MG CHEWABLE TABLET 81 MG: 81 TABLET CHEWABLE at 11:05

## 2019-05-12 NOTE — SUBJECTIVE & OBJECTIVE
Interval History: Patient resting in bed; appears better than yesterday. No complaints on assessment. Patient updated on plan and agrees.     Review of Systems   Constitutional: Negative for chills and fever.   Eyes: Negative for photophobia and visual disturbance.   Respiratory: Negative for cough and shortness of breath.    Cardiovascular: Negative for chest pain, palpitations and leg swelling.   Gastrointestinal: Negative for abdominal pain, constipation and diarrhea.   Endocrine: Negative for cold intolerance and heat intolerance.   Genitourinary: Negative for dysuria and hematuria.   Musculoskeletal: Negative for myalgias.   Skin: Positive for wound (lower extermities). Negative for rash.   Neurological: Negative for weakness and headaches.   Hematological: Negative for adenopathy. Does not bruise/bleed easily.   Psychiatric/Behavioral: Negative for agitation and behavioral problems.     Objective:     Vital Signs (Most Recent):  Temp: 98.6 °F (37 °C) (05/12/19 0500)  Pulse: (!) 55 (05/12/19 0500)  Resp: 18 (05/12/19 0500)  BP: (!) 165/68 (05/12/19 0500)  SpO2: 95 % (05/12/19 0500) Vital Signs (24h Range):  Temp:  [97.8 °F (36.6 °C)-98.6 °F (37 °C)] 98.6 °F (37 °C)  Pulse:  [55-75] 55  Resp:  [16-18] 18  SpO2:  [95 %-96 %] 95 %  BP: (165-173)/(67-82) 165/68     Weight: 127 kg (279 lb 15.7 oz)  Body mass index is 43.85 kg/m².    Intake/Output Summary (Last 24 hours) at 5/12/2019 1132  Last data filed at 5/12/2019 0500  Gross per 24 hour   Intake --   Output 1100 ml   Net -1100 ml      Physical Exam   Constitutional: She is oriented to person, place, and time. She appears well-developed and well-nourished. No distress.   Cardiovascular: Normal rate, regular rhythm, normal heart sounds and intact distal pulses.   Pulmonary/Chest: Effort normal and breath sounds normal. She has no wheezes.   Abdominal: Soft. Bowel sounds are normal. She exhibits no distension. There is no tenderness. There is no guarding.    Neurological: She is alert and oriented to person, place, and time.   Skin: Skin is warm and dry. She is not diaphoretic. There is erythema.   Bilateral feet and ankles with dressings and bandages in place.L    Patient noted to have erythema in skin folds of abdomen and groin    Psychiatric: She has a normal mood and affect. Her behavior is normal. Judgment and thought content normal.       Significant Labs:   CBC:   Recent Labs   Lab 05/11/19 0325 05/12/19  0336   WBC 6.32 5.68   HGB 10.1* 10.4*   HCT 31.4* 32.7*    322     CMP:   Recent Labs   Lab 05/11/19 0325 05/12/19  0336   * 136   K 4.7 4.2    104   CO2 22* 23   * 248*   BUN 22 30*   CREATININE 1.1 1.2   CALCIUM 9.1 9.1   ANIONGAP 9 9   EGFRNONAA 48.9* 44.0*       Significant Imaging: I have reviewed all pertinent imaging results/findings within the past 24 hours.

## 2019-05-12 NOTE — PROGRESS NOTES
[facility]  Infectious Disease  Progress Note    Patient Name: Sherin Ortiz  MRN: [id number]  Admission Date: 5/9/2019  Length of Stay: 1 days  Attending Physician: JADA Alba MD  Primary Care Provider: Ame Vasquez MD    Isolation Status: No active isolations  Assessment/Plan:      * Osteomyelitis of toe  74 yro F with PMH of IDDM2 (AIC 9.8), HTN, chronic LLE ulcers (admitted OM 3/22 for cellulitis, completed vanc and meropenem course 4/12), and CKD3 who presents to [facility] 5/9/19 with L hand edema and pain 2/2 cellulitis and gout, started on prednisone. She has chronic LLE ulcers no longer causing her any active issues, as well as new RLE ulcer on great toe present for 1 week. Podiatry evaluated R great toe and obtained a culture, as well as an MRI 5/10, which showed osteomyelitis. Previously treated for cellulitis of chronic LLE ulcers (Wound Cx polymicrobial: MRSA, enterobacter aerogenes, enterobacter cloacae, pseudomonas, morganella, proteus, klebsiella) during hospital admission in March, finished vanc and mo course 4/12. At risk for multi-drug resistant organism. R toe culture obtained by podiatry is + group C strep and GNR pending ID    Recommendations  - d/c vancomycin  - hx of prolonged QTc - last , repeat EKG to assess today  - change levofloxacin to cefepime  - follow cultures    ID will follow        Anticipated Disposition: TBD    Thank you for your consult. I will follow-up with patient. Please contact us if you have any additional questions.    Beatriz Davenport,   Infectious Disease  [facility]    Subjective:     Principal Problem:Osteomyelitis of toe    HPI: 74 yro F with PMH of IDDM2 (AIC 9.8), HTN, HLD, chronic LLE ulcers (admitted OM 3/22 for cellulitis, completed vanc and meropenem abx course 4/12), and CKD3 who presents to [facility] 5/9/19 with the complaint of L hand edema and pain. Patient reports chronic, intermittent L hand edema that she  attributes to a blown vein from a failed needle sick years ago. The day prior to admission, the edema worsened and became associated with erythema and pain. 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 MRI completed 5/9 of L hand showed cellulitis and an area of dorsal fluctuance associated with the 4th extensor compartment. Patient was thus started on abx with vanc and rocephin 5/9. She was evaluated by orthopedics and underwent aspiration of the fluctuance, which showed no septic arthritis, but did show urate crystals consistent with gout and was therefore started on prednisone. On admission, wound care was consulted to evalulate her chronic LLE ulcers, as well as new RLE ulcer on great toe. Patient reports the LLE ulcers are chronic and since she completed her abx course in April with home health, the ulcers have been improving and not causing her any issues. About a week ago, however, she noticed a new ulcer on her R toe, but denies any pain associated with it. Wound care was concerned about pseudomonal infection of the toe and her abx were thus changed to vanc and levaquin on 5/10. Podiatry has evaluated patient and obtained a culture of the R great toe. MRI of R foot was completed 5/10 that showed osteomyelitis. She reports her L hand pain and edema is improving. She denies fevers, chills, or any other complaints at this time.  Interval History: no events overnight, podiatry discussed tx options w/ patient, she would like to try abx first before amputation. Afebrile, WBC 5.6    Review of Systems   Constitutional: Negative for chills and fever.   HENT: Negative for congestion and rhinorrhea.    Eyes: Negative for photophobia and visual disturbance.   Respiratory: Negative for cough and shortness of breath.    Cardiovascular: Negative for chest pain, palpitations and leg swelling.   Gastrointestinal:  Negative for abdominal pain, constipation and diarrhea.   Endocrine: Negative for cold intolerance and heat intolerance.   Genitourinary: Negative for dysuria and hematuria.   Musculoskeletal: Negative for myalgias.   Skin: Positive for wound (lower extermities). Negative for rash.   Neurological: Negative for weakness and headaches.   Hematological: Negative for adenopathy. Does not bruise/bleed easily.   Psychiatric/Behavioral: Negative for agitation and behavioral problems.     Objective:     Vital Signs (Most Recent):  Temp: 98.6 °F (37 °C) (05/12/19 0500)  Pulse: (!) 55 (05/12/19 0500)  Resp: 18 (05/12/19 0500)  BP: (!) 165/68 (05/12/19 0500)  SpO2: 95 % (05/12/19 0500) Vital Signs (24h Range):  Temp:  [97.8 °F (36.6 °C)-98.6 °F (37 °C)] 98.6 °F (37 °C)  Pulse:  [55-75] 55  Resp:  [16-18] 18  SpO2:  [95 %-96 %] 95 %  BP: (165-173)/(67-82) 165/68     Weight: 127 kg (279 lb 15.7 oz)  Body mass index is 43.85 kg/m².    Estimated Creatinine Clearance: 55.3 mL/min (based on SCr of 1.2 mg/dL).    Physical Exam   Constitutional: She is oriented to person, place, and time. She appears well-developed and well-nourished. No distress.   HENT:   Head: Normocephalic and atraumatic.   Right Ear: External ear normal.   Left Ear: External ear normal.   Nose: Nose normal.   Mouth/Throat: Oropharynx is clear and moist.   Eyes: Pupils are equal, round, and reactive to light. Conjunctivae and EOM are normal. No scleral icterus.   Neck: Normal range of motion. Neck supple.   Cardiovascular: Normal rate, regular rhythm, normal heart sounds and intact distal pulses.   No murmur heard.  Pulmonary/Chest: Effort normal and breath sounds normal. No respiratory distress. She has no wheezes. She has no rales.   Abdominal: Soft. Bowel sounds are normal. She exhibits no distension. There is no tenderness.   Musculoskeletal: Normal range of motion. She exhibits no edema, tenderness or deformity.   R and L lower extremities wrapped with  bandages   Lymphadenopathy:     She has no cervical adenopathy.   Neurological: She is alert and oriented to person, place, and time. No cranial nerve deficit. Coordination normal.   Skin: Skin is warm and dry. No rash noted. She is not diaphoretic. No erythema.   Psychiatric: She has a normal mood and affect. Her behavior is normal.   Vitals reviewed.      Significant Labs:   CBC:   Recent Labs   Lab 05/11/19 0325 05/12/19  0336   WBC 6.32 5.68   HGB 10.1* 10.4*   HCT 31.4* 32.7*    322     CMP:   Recent Labs   Lab 05/11/19 0325 05/12/19  0336   * 136   K 4.7 4.2    104   CO2 22* 23   * 248*   BUN 22 30*   CREATININE 1.1 1.2   CALCIUM 9.1 9.1   ANIONGAP 9 9   EGFRNONAA 48.9* 44.0*     Microbiology Results (last 7 days)     Procedure Component Value Units Date/Time    Aerobic culture [[id number]] Collected:  05/10/19 1503    Order Status:  Completed Specimen:  Wound from Toe, Right Foot Updated:  05/12/19 0909     Aerobic Bacterial Culture --     STREPTOCOCCUS GROUP C  Many  Beta-hemolytic streptococci are routinely susceptible to   penicillins,cephalosporins and carbapenems.  Susceptibility testing not routinely performed       Aerobic Bacterial Culture --     GRAM NEGATIVE YAIMA  Few  Identification and susceptibility pending  Skin lyndsey also present      Urine culture [[id number]] Collected:  05/10/19 1630    Order Status:  Completed Specimen:  Urine Updated:  05/12/19 0656     Urine Culture, Routine No significant growth    Narrative:       yellow and gray tube,one orange top cup  Preferred Collection Type->Urine, Clean Catch    AFB Culture & Smear [[id number]] Collected:  05/09/19 2003    Order Status:  Completed Specimen:  Joint Fluid from Wrist, Left Updated:  05/10/19 2127     AFB Culture & Smear Culture in progress     AFB CULTURE STAIN No acid fast bacilli seen.    AFB Culture & Smear [[id number]] Collected:  05/09/19 2008    Order Status:  Completed Specimen:  Joint Fluid from Hand,  Left Updated:  05/10/19 2127     AFB Culture & Smear Culture in progress     AFB CULTURE STAIN No acid fast bacilli seen.    Culture, Anaerobe [[id number]] Collected:  05/10/19 1503    Order Status:  Sent Specimen:  Wound from Toe, Right Foot Updated:  05/10/19 1716    Culture, Anaerobic [[id number]] Collected:  05/09/19 2008    Order Status:  Completed Specimen:  Joint Fluid from Hand, Left Updated:  05/10/19 0737     Anaerobic Culture Culture in progress    Culture, Anaerobic [[id number]] Collected:  05/09/19 2003    Order Status:  Completed Specimen:  Joint Fluid from Wrist, Left Updated:  05/10/19 0737     Anaerobic Culture Culture in progress    Aerobic culture [[id number]] Collected:  05/09/19 2003    Order Status:  Completed Specimen:  Joint Fluid from Wrist, Left Updated:  05/10/19 0731     Aerobic Bacterial Culture No growth    Aerobic culture [[id number]] Collected:  05/09/19 2008    Order Status:  Completed Specimen:  Joint Fluid from Hand, Left Updated:  05/10/19 0731     Aerobic Bacterial Culture No growth    Gram stain [[id number]] Collected:  05/09/19 2008    Order Status:  Completed Specimen:  Joint Fluid from Hand, Left Updated:  05/09/19 2111     Gram Stain Result Rare WBC's      No organisms seen    Gram stain [[id number]] Collected:  05/09/19 2003    Order Status:  Completed Specimen:  Joint Fluid from Wrist, Left Updated:  05/09/19 2109     Gram Stain Result Rare WBC's      No organisms seen    Fungus culture [[id number]] Collected:  05/09/19 2008    Order Status:  Sent Specimen:  Joint Fluid from Hand, Left Updated:  05/09/19 2028    Fungus culture [[id number]] Collected:  05/09/19 2003    Order Status:  Sent Specimen:  Joint Fluid from Wrist, Left Updated:  05/09/19 2026          Significant Imaging: I have reviewed all pertinent imaging results/findings within the past 24 hours.

## 2019-05-12 NOTE — PLAN OF CARE
Problem: Adult Inpatient Plan of Care  Goal: Plan of Care Review  POC reviewed with pt. Meds reviewed. Remain on contact isolation 2nd to MRSA. Question encouraged and answered . Denies any pain or discomfort. WCTM.

## 2019-05-12 NOTE — PROGRESS NOTES
[facility] Hospital Medicine  Progress Note    Patient Name: Sherin Ortiz  MRN: [id number]  Patient Class: IP- Inpatient   Admission Date: 5/9/2019  Length of Stay: 1 days  Attending Physician: JADA Alba MD  Primary Care Provider: Ame Vasquez MD    [facility] Medicine Team: [facility] MED F Breann Singh NP    Subjective:     Principal Problem:Osteomyelitis of toe    HPI:  Ms. Ortiz is a 73 y/o F with a PMH of T2DM (A1c 9.8), HTN, HLD, and ulcers of the lower extremities (recently admitted to hospital 03/22/2019 for cellulitis of L heel ulcer, completed tx with Vancomycin 04/12/2019, followed by  wound care) who is being admitted to hospital medicine observation for L hand edema. Patient reports L hand edema with associated pain and erythema with onset early this morning. Patient reports numerous episodes of L hand edema over the past few years which the patient attributes to a blown vein received during a missed IV stick a number of years ago. However, today's episode is worse than baseline. Pain is severe, rated 10/10. No recent falls or trauma. Patient has been taking care of her bed-bound sister and having to push her sister frequently from side to side for adjustments and bed pan usage, and therefore her L hand has been weaker and more painful than baseline. Patient endorses difficulty ranging the digits on the left hand. Patient denies fever, chills, CP, SOB, N/V/D, extremity numbness, or extremity weakness.     Labs in ED notable for WBC 7.15, sed rate 104, CRP 98.4, procal 0.08, glucose 333. Vitals notable for /80 mmHg, Temp 98.6 F, pulse 80 bpm. MRI of hand and wrist: fluid distention and synovial enhancement of the 4th compartment of the extensor tendons consistent with tenosynovitis, diffuse skin soft tissue edema and enhancement especially dorsally could indicate cellulitis.  No abscess. Ortho consulted and wrist joint aspirated with analysis not concerning for  septic arthritis.  Also aspirated dorsal fluctuance and sent for culture. This was bloody fluid, not purulent. Recommend admission to medicine for IV abx and elevation in splint.     Hospital Course:  Patient admitted to hospital medicine for left hand cellulitis. Patient found to have gout. Patient seen by podiatry; MRI ordered and acute osteomyelitis. ID consulted. Recommend following cultures and will tailor abx accordingly.     Interval History: Patient resting in bed; appears better than yesterday. No complaints on assessment. Patient updated on plan and agrees.     Review of Systems   Constitutional: Negative for chills and fever.   Eyes: Negative for photophobia and visual disturbance.   Respiratory: Negative for cough and shortness of breath.    Cardiovascular: Negative for chest pain, palpitations and leg swelling.   Gastrointestinal: Negative for abdominal pain, constipation and diarrhea.   Endocrine: Negative for cold intolerance and heat intolerance.   Genitourinary: Negative for dysuria and hematuria.   Musculoskeletal: Negative for myalgias.   Skin: Positive for wound (lower extermities). Negative for rash.   Neurological: Negative for weakness and headaches.   Hematological: Negative for adenopathy. Does not bruise/bleed easily.   Psychiatric/Behavioral: Negative for agitation and behavioral problems.     Objective:     Vital Signs (Most Recent):  Temp: 98.6 °F (37 °C) (05/12/19 0500)  Pulse: (!) 55 (05/12/19 0500)  Resp: 18 (05/12/19 0500)  BP: (!) 165/68 (05/12/19 0500)  SpO2: 95 % (05/12/19 0500) Vital Signs (24h Range):  Temp:  [97.8 °F (36.6 °C)-98.6 °F (37 °C)] 98.6 °F (37 °C)  Pulse:  [55-75] 55  Resp:  [16-18] 18  SpO2:  [95 %-96 %] 95 %  BP: (165-173)/(67-82) 165/68     Weight: 127 kg (279 lb 15.7 oz)  Body mass index is 43.85 kg/m².    Intake/Output Summary (Last 24 hours) at 5/12/2019 1132  Last data filed at 5/12/2019 0500  Gross per 24 hour   Intake --   Output 1100 ml   Net -1100 ml       Physical Exam   Constitutional: She is oriented to person, place, and time. She appears well-developed and well-nourished. No distress.   Cardiovascular: Normal rate, regular rhythm, normal heart sounds and intact distal pulses.   Pulmonary/Chest: Effort normal and breath sounds normal. She has no wheezes.   Abdominal: Soft. Bowel sounds are normal. She exhibits no distension. There is no tenderness. There is no guarding.   Neurological: She is alert and oriented to person, place, and time.   Skin: Skin is warm and dry. She is not diaphoretic. There is erythema.   Bilateral feet and ankles with dressings and bandages in place.L    Patient noted to have erythema in skin folds of abdomen and groin    Psychiatric: She has a normal mood and affect. Her behavior is normal. Judgment and thought content normal.       Significant Labs:   CBC:   Recent Labs   Lab 05/11/19 0325 05/12/19  0336   WBC 6.32 5.68   HGB 10.1* 10.4*   HCT 31.4* 32.7*    322     CMP:   Recent Labs   Lab 05/11/19 0325 05/12/19  0336   * 136   K 4.7 4.2    104   CO2 22* 23   * 248*   BUN 22 30*   CREATININE 1.1 1.2   CALCIUM 9.1 9.1   ANIONGAP 9 9   EGFRNONAA 48.9* 44.0*       Significant Imaging: I have reviewed all pertinent imaging results/findings within the past 24 hours.    Assessment/Plan:      * Osteomyelitis of toe  MRI findings in keeping with acute osteomyelitis involving the right great toe distal phalanx with overlying soft tissue ulcer.  - podiatry following  Dressed foot with betadine wet to dry today,  Nursing to do hydrofera [facility], orders in.  -Wound care on board managing LE wounds with hydrofera, agree with hydrofera, recommend continue local wound care  Medically manage osteo at this time   - ID consulted; continue abx and follow cultures  - patient continues on levoquin and vanc currently; will adjust as necessary     Cellulitis of left hand  - ESR/CRP elevated on admit, VSS, WBC 7.15  - MRI of L hand  and wrist consistent with tenosynovitis and cellulitis   - ortho consulted, recs appreciated:   - wrist joint aspirated with analysis not concerning for septic arthritis   - dorsal fluctuance aspirated and urate crystals noted in the fluid; prednisone initiated    - admit for IV abx, elevation in splint   - NPO after midnight as precaution  - orders placed for prn pain meds  - 2 g Ceftriaxone IV and 1750 mg Vancomycin IV administered, will continue-deescalated the rocephin     Uncontrolled type 2 diabetes mellitus with stage 3 chronic kidney disease, with long-term current use of insulin  - glucose in   - home medications: Glimepiride 1 mg 2 tablets daily at lunchtime, Lantus 35 units qhs  - hospital regimen: insulin detemir 18u qhs + mod dose SSI for NPO patients  - better controlled today    Hyperlipidemia LDL goal <100  - continue Lipitor 40 mg qd    PAOD (peripheral arterial occlusive disease)  - continue Plavix 75 mg qd, ASA 81 mg qd    Anemia of chronic disease  - Hgb on admit 10.0, patient's baseline 9.5-10.5  - monitor with daily CBC    Ulcer of heel and midfoot  - patient recently admitted to hospital 03/22/2019 for cellulitis of L heel ulcer, completed tx with Vancomycin on 04/12/2019  - followed by wound care and  wound care  - will consult wound care; assistance appreciated  - wound care concern for pseudomonas-levoquin initiated       VTE Risk Mitigation (From admission, onward)        Ordered     Place sequential compression device  Until discontinued      05/09/19 2339     IP VTE HIGH RISK PATIENT  Once      05/09/19 2339     Place AUTUMN hose  Until discontinued      05/09/19 2339              Breann Singh NP  Department of Hospital Medicine   [facility]

## 2019-05-12 NOTE — SUBJECTIVE & OBJECTIVE
Interval History: no events overnight, podiatry discussed tx options w/ patient, she would like to try abx first before amputation. Afebrile, WBC 5.6    Review of Systems   Constitutional: Negative for chills and fever.   HENT: Negative for congestion and rhinorrhea.    Eyes: Negative for photophobia and visual disturbance.   Respiratory: Negative for cough and shortness of breath.    Cardiovascular: Negative for chest pain, palpitations and leg swelling.   Gastrointestinal: Negative for abdominal pain, constipation and diarrhea.   Endocrine: Negative for cold intolerance and heat intolerance.   Genitourinary: Negative for dysuria and hematuria.   Musculoskeletal: Negative for myalgias.   Skin: Positive for wound (lower extermities). Negative for rash.   Neurological: Negative for weakness and headaches.   Hematological: Negative for adenopathy. Does not bruise/bleed easily.   Psychiatric/Behavioral: Negative for agitation and behavioral problems.     Objective:     Vital Signs (Most Recent):  Temp: 98.6 °F (37 °C) (05/12/19 0500)  Pulse: (!) 55 (05/12/19 0500)  Resp: 18 (05/12/19 0500)  BP: (!) 165/68 (05/12/19 0500)  SpO2: 95 % (05/12/19 0500) Vital Signs (24h Range):  Temp:  [97.8 °F (36.6 °C)-98.6 °F (37 °C)] 98.6 °F (37 °C)  Pulse:  [55-75] 55  Resp:  [16-18] 18  SpO2:  [95 %-96 %] 95 %  BP: (165-173)/(67-82) 165/68     Weight: 127 kg (279 lb 15.7 oz)  Body mass index is 43.85 kg/m².    Estimated Creatinine Clearance: 55.3 mL/min (based on SCr of 1.2 mg/dL).    Physical Exam   Constitutional: She is oriented to person, place, and time. She appears well-developed and well-nourished. No distress.   HENT:   Head: Normocephalic and atraumatic.   Right Ear: External ear normal.   Left Ear: External ear normal.   Nose: Nose normal.   Mouth/Throat: Oropharynx is clear and moist.   Eyes: Pupils are equal, round, and reactive to light. Conjunctivae and EOM are normal. No scleral icterus.   Neck: Normal range of motion.  Neck supple.   Cardiovascular: Normal rate, regular rhythm, normal heart sounds and intact distal pulses.   No murmur heard.  Pulmonary/Chest: Effort normal and breath sounds normal. No respiratory distress. She has no wheezes. She has no rales.   Abdominal: Soft. Bowel sounds are normal. She exhibits no distension. There is no tenderness.   Musculoskeletal: Normal range of motion. She exhibits no edema, tenderness or deformity.   R and L lower extremities wrapped with bandages   Lymphadenopathy:     She has no cervical adenopathy.   Neurological: She is alert and oriented to person, place, and time. No cranial nerve deficit. Coordination normal.   Skin: Skin is warm and dry. No rash noted. She is not diaphoretic. No erythema.   Psychiatric: She has a normal mood and affect. Her behavior is normal.   Vitals reviewed.      Significant Labs:   CBC:   Recent Labs   Lab 05/11/19 0325 05/12/19  0336   WBC 6.32 5.68   HGB 10.1* 10.4*   HCT 31.4* 32.7*    322     CMP:   Recent Labs   Lab 05/11/19 0325 05/12/19  0336   * 136   K 4.7 4.2    104   CO2 22* 23   * 248*   BUN 22 30*   CREATININE 1.1 1.2   CALCIUM 9.1 9.1   ANIONGAP 9 9   EGFRNONAA 48.9* 44.0*     Microbiology Results (last 7 days)     Procedure Component Value Units Date/Time    Aerobic culture [[id number]] Collected:  05/10/19 1503    Order Status:  Completed Specimen:  Wound from Toe, Right Foot Updated:  05/12/19 0909     Aerobic Bacterial Culture --     STREPTOCOCCUS GROUP C  Many  Beta-hemolytic streptococci are routinely susceptible to   penicillins,cephalosporins and carbapenems.  Susceptibility testing not routinely performed       Aerobic Bacterial Culture --     GRAM NEGATIVE YAIMA  Few  Identification and susceptibility pending  Skin lyndsey also present      Urine culture [[id number]] Collected:  05/10/19 1630    Order Status:  Completed Specimen:  Urine Updated:  05/12/19 0656     Urine Culture, Routine No significant growth     Narrative:       yellow and gray tube,one orange top cup  Preferred Collection Type->Urine, Clean Catch    AFB Culture & Smear [[id number]] Collected:  05/09/19 2003    Order Status:  Completed Specimen:  Joint Fluid from Wrist, Left Updated:  05/10/19 2127     AFB Culture & Smear Culture in progress     AFB CULTURE STAIN No acid fast bacilli seen.    AFB Culture & Smear [[id number]] Collected:  05/09/19 2008    Order Status:  Completed Specimen:  Joint Fluid from Hand, Left Updated:  05/10/19 2127     AFB Culture & Smear Culture in progress     AFB CULTURE STAIN No acid fast bacilli seen.    Culture, Anaerobe [[id number]] Collected:  05/10/19 1503    Order Status:  Sent Specimen:  Wound from Toe, Right Foot Updated:  05/10/19 1716    Culture, Anaerobic [[id number]] Collected:  05/09/19 2008    Order Status:  Completed Specimen:  Joint Fluid from Hand, Left Updated:  05/10/19 0737     Anaerobic Culture Culture in progress    Culture, Anaerobic [[id number]] Collected:  05/09/19 2003    Order Status:  Completed Specimen:  Joint Fluid from Wrist, Left Updated:  05/10/19 0737     Anaerobic Culture Culture in progress    Aerobic culture [[id number]] Collected:  05/09/19 2003    Order Status:  Completed Specimen:  Joint Fluid from Wrist, Left Updated:  05/10/19 0731     Aerobic Bacterial Culture No growth    Aerobic culture [[id number]] Collected:  05/09/19 2008    Order Status:  Completed Specimen:  Joint Fluid from Hand, Left Updated:  05/10/19 0731     Aerobic Bacterial Culture No growth    Gram stain [[id number]] Collected:  05/09/19 2008    Order Status:  Completed Specimen:  Joint Fluid from Hand, Left Updated:  05/09/19 2111     Gram Stain Result Rare WBC's      No organisms seen    Gram stain [[id number]] Collected:  05/09/19 2003    Order Status:  Completed Specimen:  Joint Fluid from Wrist, Left Updated:  05/09/19 2109     Gram Stain Result Rare WBC's      No organisms seen    Fungus culture [[id number]] Collected:   05/09/19 2008    Order Status:  Sent Specimen:  Joint Fluid from Hand, Left Updated:  05/09/19 2028    Fungus culture [[id number]] Collected:  05/09/19 2003    Order Status:  Sent Specimen:  Joint Fluid from Wrist, Left Updated:  05/09/19 2026          Significant Imaging: I have reviewed all pertinent imaging results/findings within the past 24 hours.

## 2019-05-12 NOTE — PROGRESS NOTES
Pharmacokinetic Assessment Follow Up: IV Vancomycin    Vancomycin serum concentration assessment(s):     The trough level of 18.7 was drawn correctly on 5/11 at 2319  and can be used to guide therapy at this time.    Vancomycin Regimen Plan:     Continue regimen to Vancomycin 2000 mg IV every 24hour with next serum trough concentration measured at 2230 prior to 4th dose on 5/15/19.    Pharmacy will continue to follow and monitor vancomycin.    Please contact pharmacy at extension [phone] for questions regarding this assessment.    Thank you for the consult,   Carmela Sen     Patient brief summary:  Sherin Ortiz is a 74 y.o. female initiated on antimicrobial therapy with IV Vancomycin for treatment of suspected skin & soft tissue        Drug Allergies:   Review of patient's allergies indicates:   Allergen Reactions    Penicillins Hives     Other reaction(s): Hives    Sulfa (sulfonamide antibiotics) Other (See Comments)     Eyad, pt states her doctor told her the shakes were possibly caused by an allergy to sulfa       Actual Body Weight:   127 kg    Renal Function:   Estimated Creatinine Clearance: 55.3 mL/min (based on SCr of 1.2 mg/dL).,         CBC (last 72 hours):  Recent Labs   Lab Result Units 05/09/19  1322 05/10/19  0321 05/11/19  0325 05/12/19  0336   WBC K/uL 7.15 5.74 6.32 5.68   Hemoglobin g/dL 10.0* 9.7* 10.1* 10.4*   Hematocrit % 31.2* 30.6* 31.4* 32.7*   Platelets K/uL 310 288 309 322   Gran% % 76.5* 69.0 83.0* 69.5   Lymph% % 14.0* 18.1 11.6* 21.3   Mono% % 9.0 11.3 4.7 9.0   Eosinophil% % 0.1 1.0 0.0 0.0   Basophil% % 0.3 0.3 0.2 0.0   Differential Method  Automated Automated Automated Automated       Metabolic Panel (last 72 hours):  Recent Labs   Lab Result Units 05/09/19  1322 05/10/19  0321 05/10/19  1630 05/11/19  0325 05/12/19  0336   Sodium mmol/L 133* 135*  --  133* 136   Potassium mmol/L 4.5 3.9  --  4.7 4.2   Chloride mmol/L 102 103  --  102 104   CO2 mmol/L 23 24  --  22* 23    Glucose mg/dL 333* 162*  --  224* 248*   Glucose, UA   --   --  3+*  --   --    BUN, Bld mg/dL 21 18  --  22 30*   Creatinine mg/dL 1.3 1.0  --  1.1 1.2       Vancomycin Administrations:  vancomycin given in the last 96 hours                   vancomycin 2 g in 0.9% sodium chloride 500 mL IVPB (mg) 2,000 mg New Bag 05/12/19 0011     2,000 mg New Bag 05/10/19 2231                Drug levels (last 3 results):  Recent Labs   Lab Result Units 05/11/19  2319   Vancomycin-Trough ug/mL 18.7       Microbiologic Results:  Microbiology Results (last 7 days)     Procedure Component Value Units Date/Time    Urine culture [[id number]] Collected:  05/10/19 1630    Order Status:  Completed Specimen:  Urine Updated:  05/12/19 0656     Urine Culture, Routine No significant growth    Narrative:       yellow and gray tube,one orange top cup  Preferred Collection Type->Urine, Clean Catch    Aerobic culture [[id number]] Collected:  05/10/19 1503    Order Status:  Completed Specimen:  Wound from Toe, Right Foot Updated:  05/11/19 1334     Aerobic Bacterial Culture Insufficient incubation, culture in progress    AFB Culture & Smear [[id number]] Collected:  05/09/19 2003    Order Status:  Completed Specimen:  Joint Fluid from Wrist, Left Updated:  05/10/19 2127     AFB Culture & Smear Culture in progress     AFB CULTURE STAIN No acid fast bacilli seen.    AFB Culture & Smear [[id number]] Collected:  05/09/19 2008    Order Status:  Completed Specimen:  Joint Fluid from Hand, Left Updated:  05/10/19 2127     AFB Culture & Smear Culture in progress     AFB CULTURE STAIN No acid fast bacilli seen.    Culture, Anaerobe [[id number]] Collected:  05/10/19 1503    Order Status:  Sent Specimen:  Wound from Toe, Right Foot Updated:  05/10/19 1716    Culture, Anaerobic [[id number]] Collected:  05/09/19 2008    Order Status:  Completed Specimen:  Joint Fluid from Hand, Left Updated:  05/10/19 0737     Anaerobic Culture Culture in progress    Culture,  Anaerobic [[id number]] Collected:  05/09/19 2003    Order Status:  Completed Specimen:  Joint Fluid from Wrist, Left Updated:  05/10/19 0737     Anaerobic Culture Culture in progress    Aerobic culture [[id number]] Collected:  05/09/19 2003    Order Status:  Completed Specimen:  Joint Fluid from Wrist, Left Updated:  05/10/19 0731     Aerobic Bacterial Culture No growth    Aerobic culture [[id number]] Collected:  05/09/19 2008    Order Status:  Completed Specimen:  Joint Fluid from Hand, Left Updated:  05/10/19 0731     Aerobic Bacterial Culture No growth    Gram stain [[id number]] Collected:  05/09/19 2008    Order Status:  Completed Specimen:  Joint Fluid from Hand, Left Updated:  05/09/19 2111     Gram Stain Result Rare WBC's      No organisms seen    Gram stain [[id number]] Collected:  05/09/19 2003    Order Status:  Completed Specimen:  Joint Fluid from Wrist, Left Updated:  05/09/19 2109     Gram Stain Result Rare WBC's      No organisms seen    Fungus culture [[id number]] Collected:  05/09/19 2008    Order Status:  Sent Specimen:  Joint Fluid from Hand, Left Updated:  05/09/19 2028    Fungus culture [[id number]] Collected:  05/09/19 2003    Order Status:  Sent Specimen:  Joint Fluid from Wrist, Left Updated:  05/09/19 2026

## 2019-05-12 NOTE — PLAN OF CARE
Problem: Adult Inpatient Plan of Care  Goal: Plan of Care Review  Outcome: Ongoing (interventions implemented as appropriate)  POC reviewed with patient. All questions and concerns reviewed. VSS throughout shift. Fall/safety precautions implemented and maintained. Purewick care provided. Blood glucose monitored. Bed locked in lowest position. Call bell within reach. Will continue to monitor.

## 2019-05-12 NOTE — ASSESSMENT & PLAN NOTE
MRI findings in keeping with acute osteomyelitis involving the right great toe distal phalanx with overlying soft tissue ulcer.  - podiatry following  Dressed foot with betadine wet to dry today,  Nursing to do hydrofera MWF, orders in.  -Wound care on board managing LE wounds with hydrofera, agree with hydrofera, recommend continue local wound care  Medically manage osteo at this time   - ID consulted; continue abx and follow cultures  - patient continues on levoquin and vanc currently; will adjust as necessary

## 2019-05-12 NOTE — ASSESSMENT & PLAN NOTE
74 yro F with PMH of IDDM2 (AIC 9.8), HTN, chronic LLE ulcers (admitted OM 3/22 for cellulitis, completed vanc and meropenem course 4/12), and CKD3 who presents to [facility] [place], [place] 5/9/19 with L hand edema and pain 2/2 cellulitis and gout, started on prednisone. She has chronic LLE ulcers no longer causing her any active issues, as well as new RLE ulcer on great toe present for 1 week. Podiatry evaluated R great toe and obtained a culture, as well as an MRI 5/10, which showed osteomyelitis. Previously treated for cellulitis of chronic LLE ulcers (Wound Cx polymicrobial: MRSA, enterobacter aerogenes, enterobacter cloacae, pseudomonas, morganella, proteus, klebsiella) during hospital admission in March, finished vanc and mo course 4/12. At risk for multi-drug resistant organism. R toe culture obtained by podiatry is + group C strep and GNR pending ID    Recommendations  - d/c vancomycin  - hx of prolonged QTc - last , repeat EKG to assess today  - change levofloxacin to cefepime  - follow cultures    ID will follow

## 2019-05-13 LAB
ANION GAP SERPL CALC-SCNC: 10 MMOL/L (ref 8–16)
BACTERIA SPEC AEROBE CULT: NO GROWTH
BACTERIA SPEC AEROBE CULT: NO GROWTH
BACTERIA SPEC AEROBE CULT: NORMAL
BACTERIA SPEC AEROBE CULT: NORMAL
BASOPHILS # BLD AUTO: 0.01 K/UL (ref 0–0.2)
BASOPHILS NFR BLD: 0.2 % (ref 0–1.9)
BUN SERPL-MCNC: 32 MG/DL (ref 8–23)
CALCIUM SERPL-MCNC: 9.3 MG/DL (ref 8.7–10.5)
CHLORIDE SERPL-SCNC: 107 MMOL/L (ref 95–110)
CO2 SERPL-SCNC: 22 MMOL/L (ref 23–29)
CREAT SERPL-MCNC: 1.3 MG/DL (ref 0.5–1.4)
DIFFERENTIAL METHOD: ABNORMAL
EOSINOPHIL # BLD AUTO: 0 K/UL (ref 0–0.5)
EOSINOPHIL NFR BLD: 0 % (ref 0–8)
ERYTHROCYTE [DISTWIDTH] IN BLOOD BY AUTOMATED COUNT: 14.4 % (ref 11.5–14.5)
EST. GFR  (AFRICAN AMERICAN): 46 ML/MIN/1.73 M^2
EST. GFR  (NON AFRICAN AMERICAN): 39.9 ML/MIN/1.73 M^2
GLUCOSE SERPL-MCNC: 183 MG/DL (ref 70–110)
HCT VFR BLD AUTO: 30.7 % (ref 37–48.5)
HGB BLD-MCNC: 9.8 G/DL (ref 12–16)
IMM GRANULOCYTES # BLD AUTO: 0.02 K/UL (ref 0–0.04)
IMM GRANULOCYTES NFR BLD AUTO: 0.4 % (ref 0–0.5)
LYMPHOCYTES # BLD AUTO: 1.3 K/UL (ref 1–4.8)
LYMPHOCYTES NFR BLD: 23.6 % (ref 18–48)
MCH RBC QN AUTO: 28.6 PG (ref 27–31)
MCHC RBC AUTO-ENTMCNC: 31.9 G/DL (ref 32–36)
MCV RBC AUTO: 90 FL (ref 82–98)
MONOCYTES # BLD AUTO: 0.4 K/UL (ref 0.3–1)
MONOCYTES NFR BLD: 6.8 % (ref 4–15)
NEUTROPHILS # BLD AUTO: 3.7 K/UL (ref 1.8–7.7)
NEUTROPHILS NFR BLD: 69 % (ref 38–73)
NRBC BLD-RTO: 0 /100 WBC
PLATELET # BLD AUTO: 314 K/UL (ref 150–350)
PMV BLD AUTO: 9.4 FL (ref 9.2–12.9)
POCT GLUCOSE: 193 MG/DL (ref 70–110)
POCT GLUCOSE: 201 MG/DL (ref 70–110)
POCT GLUCOSE: 216 MG/DL (ref 70–110)
POTASSIUM SERPL-SCNC: 4.3 MMOL/L (ref 3.5–5.1)
RBC # BLD AUTO: 3.43 M/UL (ref 4–5.4)
SODIUM SERPL-SCNC: 139 MMOL/L (ref 136–145)
WBC # BLD AUTO: 5.29 K/UL (ref 3.9–12.7)

## 2019-05-13 PROCEDURE — 25000003 PHARM REV CODE 250: Mod: HCNC | Performed by: PHYSICIAN ASSISTANT

## 2019-05-13 PROCEDURE — 63600175 PHARM REV CODE 636 W HCPCS: Mod: HCNC | Performed by: PHYSICIAN ASSISTANT

## 2019-05-13 PROCEDURE — 99233 PR SUBSEQUENT HOSPITAL CARE,LEVL III: ICD-10-PCS | Mod: HCNC,,, | Performed by: NURSE PRACTITIONER

## 2019-05-13 PROCEDURE — 93005 ELECTROCARDIOGRAM TRACING: CPT | Mod: HCNC

## 2019-05-13 PROCEDURE — 93010 EKG 12-LEAD: ICD-10-PCS | Mod: HCNC,,, | Performed by: INTERNAL MEDICINE

## 2019-05-13 PROCEDURE — 93010 ELECTROCARDIOGRAM REPORT: CPT | Mod: HCNC,,, | Performed by: INTERNAL MEDICINE

## 2019-05-13 PROCEDURE — 85025 COMPLETE CBC W/AUTO DIFF WBC: CPT | Mod: HCNC

## 2019-05-13 PROCEDURE — 80048 BASIC METABOLIC PNL TOTAL CA: CPT | Mod: HCNC

## 2019-05-13 PROCEDURE — 20600001 HC STEP DOWN PRIVATE ROOM: Mod: HCNC

## 2019-05-13 PROCEDURE — S5571 INSULIN DISPOS PEN 3 ML: HCPCS | Mod: HCNC | Performed by: PHYSICIAN ASSISTANT

## 2019-05-13 PROCEDURE — 36415 COLL VENOUS BLD VENIPUNCTURE: CPT | Mod: HCNC

## 2019-05-13 PROCEDURE — 99233 SBSQ HOSP IP/OBS HIGH 50: CPT | Mod: HCNC,GC,, | Performed by: INTERNAL MEDICINE

## 2019-05-13 PROCEDURE — 63600175 PHARM REV CODE 636 W HCPCS: Mod: HCNC | Performed by: NURSE PRACTITIONER

## 2019-05-13 PROCEDURE — 25000003 PHARM REV CODE 250: Mod: HCNC | Performed by: NURSE PRACTITIONER

## 2019-05-13 PROCEDURE — 99233 PR SUBSEQUENT HOSPITAL CARE,LEVL III: ICD-10-PCS | Mod: HCNC,GC,, | Performed by: INTERNAL MEDICINE

## 2019-05-13 PROCEDURE — 99233 SBSQ HOSP IP/OBS HIGH 50: CPT | Mod: HCNC,,, | Performed by: NURSE PRACTITIONER

## 2019-05-13 RX ORDER — INSULIN ASPART 100 [IU]/ML
4 INJECTION, SOLUTION INTRAVENOUS; SUBCUTANEOUS
Status: DISCONTINUED | OUTPATIENT
Start: 2019-05-13 | End: 2019-05-14 | Stop reason: HOSPADM

## 2019-05-13 RX ORDER — CEFEPIME HYDROCHLORIDE 2 G/1
2 INJECTION, POWDER, FOR SOLUTION INTRAVENOUS
Status: DISCONTINUED | OUTPATIENT
Start: 2019-05-13 | End: 2019-05-14

## 2019-05-13 RX ADMIN — MICONAZOLE NITRATE: 20 OINTMENT TOPICAL at 09:05

## 2019-05-13 RX ADMIN — ASPIRIN 81 MG CHEWABLE TABLET 81 MG: 81 TABLET CHEWABLE at 11:05

## 2019-05-13 RX ADMIN — INSULIN ASPART 4 UNITS: 100 INJECTION, SOLUTION INTRAVENOUS; SUBCUTANEOUS at 05:05

## 2019-05-13 RX ADMIN — INSULIN DETEMIR 18 UNITS: 100 INJECTION, SOLUTION SUBCUTANEOUS at 09:05

## 2019-05-13 RX ADMIN — POLYETHYLENE GLYCOL 3350 17 G: 17 POWDER, FOR SOLUTION ORAL at 11:05

## 2019-05-13 RX ADMIN — CEFEPIME 2 G: 2 INJECTION, POWDER, FOR SOLUTION INTRAVENOUS at 09:05

## 2019-05-13 RX ADMIN — CEFEPIME 2 G: 2 INJECTION, POWDER, FOR SOLUTION INTRAVENOUS at 11:05

## 2019-05-13 RX ADMIN — HYDROCODONE BITARTRATE AND ACETAMINOPHEN 1 TABLET: 10; 325 TABLET ORAL at 11:05

## 2019-05-13 RX ADMIN — COLCHICINE 0.6 MG: 0.6 TABLET, FILM COATED ORAL at 09:05

## 2019-05-13 RX ADMIN — ATORVASTATIN CALCIUM 40 MG: 20 TABLET, FILM COATED ORAL at 09:05

## 2019-05-13 RX ADMIN — COLCHICINE 0.6 MG: 0.6 TABLET, FILM COATED ORAL at 11:05

## 2019-05-13 RX ADMIN — INSULIN ASPART 2 UNITS: 100 INJECTION, SOLUTION INTRAVENOUS; SUBCUTANEOUS at 11:05

## 2019-05-13 NOTE — SUBJECTIVE & OBJECTIVE
Interval History: NAEON. Patient proceeding with abx therapy for her osteomyelitis, deferring surgery at this time. She denies fevers, chills, or any other complaints at this time.    Review of Systems   Constitutional: Negative for chills and fever.   HENT: Negative for congestion and rhinorrhea.    Eyes: Negative for photophobia and visual disturbance.   Respiratory: Negative for cough and shortness of breath.    Cardiovascular: Negative for chest pain, palpitations and leg swelling.   Gastrointestinal: Negative for abdominal pain, constipation and diarrhea.   Endocrine: Negative for cold intolerance and heat intolerance.   Genitourinary: Negative for dysuria and hematuria.   Musculoskeletal: Negative for myalgias.   Skin: Positive for wound (lower extermities). Negative for rash.   Neurological: Negative for weakness and headaches.   Hematological: Negative for adenopathy. Does not bruise/bleed easily.   Psychiatric/Behavioral: Negative for agitation and behavioral problems.     Objective:     Vital Signs (Most Recent):  Temp: 97.5 °F (36.4 °C) (05/13/19 0348)  Pulse: (!) 55 (05/13/19 0348)  Resp: 18 (05/13/19 0348)  BP: 138/62 (05/13/19 0348)  SpO2: 97 % (05/13/19 0348) Vital Signs (24h Range):  Temp:  [97.5 °F (36.4 °C)-98.3 °F (36.8 °C)] 97.5 °F (36.4 °C)  Pulse:  [54-64] 55  Resp:  [14-18] 18  SpO2:  [97 %-99 %] 97 %  BP: (136-171)/(62-73) 138/62     Weight: 127 kg (279 lb 15.7 oz)  Body mass index is 43.85 kg/m².    Estimated Creatinine Clearance: 51 mL/min (based on SCr of 1.3 mg/dL).    Physical Exam   Constitutional: She is oriented to person, place, and time. She appears well-developed and well-nourished. No distress.   HENT:   Head: Normocephalic and atraumatic.   Eyes: Pupils are equal, round, and reactive to light. EOM are normal. No scleral icterus.   Neck: Normal range of motion. Neck supple.   Cardiovascular: Normal rate, regular rhythm, normal heart sounds and intact distal pulses.   No murmur  heard.  Pulmonary/Chest: Effort normal and breath sounds normal. No respiratory distress. She has no wheezes. She has no rales.   Abdominal: Soft. Bowel sounds are normal. She exhibits no distension. There is no tenderness.   Musculoskeletal: Normal range of motion. She exhibits no edema, tenderness or deformity.   R and L lower extremities wrapped with bandages  R great toe with area of skin breakdown at tip   Lymphadenopathy:     She has no cervical adenopathy.   Neurological: She is alert and oriented to person, place, and time.   Skin: Skin is warm and dry. She is not diaphoretic.   Psychiatric: She has a normal mood and affect. Her behavior is normal.   Vitals reviewed.      Significant Labs:   CBC:   Recent Labs   Lab 05/12/19  0336 05/13/19 0452   WBC 5.68 5.29   HGB 10.4* 9.8*   HCT 32.7* 30.7*    314     CMP:   Recent Labs   Lab 05/12/19 0336 05/13/19 0452    139   K 4.2 4.3    107   CO2 23 22*   * 183*   BUN 30* 32*   CREATININE 1.2 1.3   CALCIUM 9.1 9.3   ANIONGAP 9 10   EGFRNONAA 44.0* 39.9*     Microbiology Results (last 7 days)     Procedure Component Value Units Date/Time    Culture, Anaerobe [[id number]] Collected:  05/10/19 1503    Order Status:  Completed Specimen:  Wound from Toe, Right Foot Updated:  05/13/19 0927     Anaerobic Culture Culture in progress    Aerobic culture [[id number]] Collected:  05/09/19 2003    Order Status:  Completed Specimen:  Joint Fluid from Wrist, Left Updated:  05/13/19 0804     Aerobic Bacterial Culture No growth    Aerobic culture [[id number]] Collected:  05/09/19 2008    Order Status:  Completed Specimen:  Joint Fluid from Hand, Left Updated:  05/13/19 0804     Aerobic Bacterial Culture No growth    Aerobic culture [[id number]] Collected:  05/10/19 1503    Order Status:  Completed Specimen:  Wound from Toe, Right Foot Updated:  05/12/19 0909     Aerobic Bacterial Culture --     STREPTOCOCCUS GROUP C  Many  Beta-hemolytic streptococci are  routinely susceptible to   penicillins,cephalosporins and carbapenems.  Susceptibility testing not routinely performed       Aerobic Bacterial Culture --     GRAM NEGATIVE YAIMA  Few  Identification and susceptibility pending  Skin lyndsey also present      Urine culture [[id number]] Collected:  05/10/19 1630    Order Status:  Completed Specimen:  Urine Updated:  05/12/19 0656     Urine Culture, Routine No significant growth    Narrative:       yellow and gray tube,one orange top cup  Preferred Collection Type->Urine, Clean Catch    AFB Culture & Smear [[id number]] Collected:  05/09/19 2003    Order Status:  Completed Specimen:  Joint Fluid from Wrist, Left Updated:  05/10/19 2127     AFB Culture & Smear Culture in progress     AFB CULTURE STAIN No acid fast bacilli seen.    AFB Culture & Smear [[id number]] Collected:  05/09/19 2008    Order Status:  Completed Specimen:  Joint Fluid from Hand, Left Updated:  05/10/19 2127     AFB Culture & Smear Culture in progress     AFB CULTURE STAIN No acid fast bacilli seen.    Culture, Anaerobic [[id number]] Collected:  05/09/19 2008    Order Status:  Completed Specimen:  Joint Fluid from Hand, Left Updated:  05/10/19 0737     Anaerobic Culture Culture in progress    Culture, Anaerobic [[id number]] Collected:  05/09/19 2003    Order Status:  Completed Specimen:  Joint Fluid from Wrist, Left Updated:  05/10/19 0737     Anaerobic Culture Culture in progress    Gram stain [[id number]] Collected:  05/09/19 2008    Order Status:  Completed Specimen:  Joint Fluid from Hand, Left Updated:  05/09/19 2111     Gram Stain Result Rare WBC's      No organisms seen    Gram stain [[id number]] Collected:  05/09/19 2003    Order Status:  Completed Specimen:  Joint Fluid from Wrist, Left Updated:  05/09/19 2109     Gram Stain Result Rare WBC's      No organisms seen    Fungus culture [[id number]] Collected:  05/09/19 2008    Order Status:  Sent Specimen:  Joint Fluid from Hand, Left Updated:  05/09/19 2028     Fungus culture [[id number]] Collected:  05/09/19 2003    Order Status:  Sent Specimen:  Joint Fluid from Wrist, Left Updated:  05/09/19 2026          Significant Imaging: I have reviewed all pertinent imaging results/findings within the past 24 hours.

## 2019-05-13 NOTE — PROGRESS NOTES
Therapy with vancomycin complete and/or consult discontinued by provider.  Pharmacy will sign off, please re-consult as needed.    Carmel Dickinson, PharmD, BCPS  [phone]

## 2019-05-13 NOTE — ASSESSMENT & PLAN NOTE
- glucose in   - home medications: Glimepiride 1 mg 2 tablets daily at lunchtime, Lantus 35 units qhs  - hospital regimen: insulin detemir 18u qhs + mod dose SSI for NPO patients  -add 4 units aspart TIDWM

## 2019-05-13 NOTE — PLAN OF CARE
Problem: Adult Inpatient Plan of Care  Goal: Plan of Care Review  Outcome: Ongoing (interventions implemented as appropriate)  Blood glucose remains elevated, non-symptomatic. Bandages changed, no tingling or abnormal sensation in all extremities, pulses 2+. Patient educated on plan of care. Will continue to monitor

## 2019-05-13 NOTE — PROGRESS NOTES
[facility]  Infectious Disease  Progress Note    Patient Name: Sherin Ortiz  MRN: [id number]  Admission Date: 5/9/2019  Length of Stay: 2 days  Attending Physician: JADA Alba MD  Primary Care Provider: Ame Vasquez MD    Isolation Status: No active isolations  Assessment/Plan:      * Osteomyelitis of toe  74 yro F with PMH of IDDM2 (AIC 9.8), HTN, chronic LLE ulcers (admitted [facility] 3/22 for cellulitis, completed vanc and meropenem course 4/12), and CKD3 who [place] [facility] [place] with L hand edema and pain 2/2 cellulitis and gout, started on prednisone. She has chronic LLE ulcers no longer causing her any active issues, as well as new RLE ulcer on great toe present for 1 week. Podiatry evaluated R great toe and obtained a culture, as well as an MRI 5/10, which showed osteomyelitis. Previously treated for cellulitis of chronic LLE ulcers (Wound Cx polymicrobial: MRSA, enterobacter aerogenes, enterobacter cloacae, pseudomonas, morganella, proteus, klebsiella) during hospital admission in March, finished vanc and mo course 4/12. At risk for multi-drug resistant organism. R toe culture obtained by podiatry is + group C strep and proteus     Recommendations  - d/c'ed vancomycin  - Wound Cx 5/10 from podiatry- group C strep and pan-sensitive proteus  - narrow abx to cefazolin 2 g every 8 hours for 6 weeks   - out pt FU in ID clinic and weekly labs CBC, CMP, ESR, CRP weekly faxed to ID at [phone]            Elizabeth Day MD  Infectious Disease  [facility]    Subjective:     Principal Problem:Osteomyelitis of toe    HPI: 74 yro F with PMH of IDDM2 (AIC 9.8), HTN, HLD, chronic LLE ulcers (admitted OM 3/22 for cellulitis, completed vanc and meropenem abx course 4/12), and CKD3 who [place] [facility] [place] with the complaint of L hand edema and pain. Patient reports chronic, intermittent L hand edema that she attributes to a blown vein from a failed needle sick years  ago. The day prior to admission, the edema worsened and became associated with erythema and pain. 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 MRI completed 5/9 of L hand showed cellulitis and an area of dorsal fluctuance associated with the 4th extensor compartment. Patient was thus started on abx with vanc and rocephin 5/9. She was evaluated by orthopedics and underwent aspiration of the fluctuance, which showed no septic arthritis, but did show urate crystals consistent with gout and was therefore started on prednisone. On admission, wound care was consulted to evalulate her chronic LLE ulcers, as well as new RLE ulcer on great toe. Patient reports the LLE ulcers are chronic and since she completed her abx course in April with home health, the ulcers have been improving and not causing her any issues. About a week ago, however, she noticed a new ulcer on her R toe, but denies any pain associated with it. Wound care was concerned about pseudomonal infection of the toe and her abx were thus changed to vanc and levaquin on 5/10. Podiatry has evaluated patient and obtained a culture of the R great toe. MRI of R foot was completed 5/10 that showed osteomyelitis. She reports her L hand pain and edema is improving. She denies fevers, chills, or any other complaints at this time.  Interval History: NAEON. Patient proceeding with abx therapy for her osteomyelitis, deferring surgery at this time. She denies fevers, chills, or any other complaints at this time.    Review of Systems   Constitutional: Negative for chills and fever.   HENT: Negative for congestion and rhinorrhea.    Eyes: Negative for photophobia and visual disturbance.   Respiratory: Negative for cough and shortness of breath.    Cardiovascular: Negative for chest pain, palpitations and leg swelling.   Gastrointestinal: Negative for abdominal pain,  constipation and diarrhea.   Endocrine: Negative for cold intolerance and heat intolerance.   Genitourinary: Negative for dysuria and hematuria.   Musculoskeletal: Negative for myalgias.   Skin: Positive for wound (lower extermities). Negative for rash.   Neurological: Negative for weakness and headaches.   Hematological: Negative for adenopathy. Does not bruise/bleed easily.   Psychiatric/Behavioral: Negative for agitation and behavioral problems.     Objective:     Vital Signs (Most Recent):  Temp: 97.5 °F (36.4 °C) (05/13/19 0348)  Pulse: (!) 55 (05/13/19 0348)  Resp: 18 (05/13/19 0348)  BP: 138/62 (05/13/19 0348)  SpO2: 97 % (05/13/19 0348) Vital Signs (24h Range):  Temp:  [97.5 °F (36.4 °C)-98.3 °F (36.8 °C)] 97.5 °F (36.4 °C)  Pulse:  [54-64] 55  Resp:  [14-18] 18  SpO2:  [97 %-99 %] 97 %  BP: (136-171)/(62-73) 138/62     Weight: 127 kg (279 lb 15.7 oz)  Body mass index is 43.85 kg/m².    Estimated Creatinine Clearance: 51 mL/min (based on SCr of 1.3 mg/dL).    Physical Exam   Constitutional: She is oriented to person, place, and time. She appears well-developed and well-nourished. No distress.   HENT:   Head: Normocephalic and atraumatic.   Eyes: Pupils are equal, round, and reactive to light. EOM are normal. No scleral icterus.   Neck: Normal range of motion. Neck supple.   Cardiovascular: Normal rate, regular rhythm, normal heart sounds and intact distal pulses.   No murmur heard.  Pulmonary/Chest: Effort normal and breath sounds normal. No respiratory distress. She has no wheezes. She has no rales.   Abdominal: Soft. Bowel sounds are normal. She exhibits no distension. There is no tenderness.   Musculoskeletal: Normal range of motion. She exhibits no edema, tenderness or deformity.   R and L lower extremities wrapped with bandages  R great toe with area of skin breakdown at tip   Lymphadenopathy:     She has no cervical adenopathy.   Neurological: She is alert and oriented to person, place, and time.    Skin: Skin is warm and dry. She is not diaphoretic.   Psychiatric: She has a normal mood and affect. Her behavior is normal.   Vitals reviewed.      Significant Labs:   CBC:   Recent Labs   Lab 05/12/19 0336 05/13/19 0452   WBC 5.68 5.29   HGB 10.4* 9.8*   HCT 32.7* 30.7*    314     CMP:   Recent Labs   Lab 05/12/19 0336 05/13/19 0452    139   K 4.2 4.3    107   CO2 23 22*   * 183*   BUN 30* 32*   CREATININE 1.2 1.3   CALCIUM 9.1 9.3   ANIONGAP 9 10   EGFRNONAA 44.0* 39.9*     Microbiology Results (last 7 days)     Procedure Component Value Units Date/Time    Culture, Anaerobe [[id number]] Collected:  05/10/19 1503    Order Status:  Completed Specimen:  Wound from Toe, Right Foot Updated:  05/13/19 0927     Anaerobic Culture Culture in progress    Aerobic culture [[id number]] Collected:  05/09/19 2003    Order Status:  Completed Specimen:  Joint Fluid from Wrist, Left Updated:  05/13/19 0804     Aerobic Bacterial Culture No growth    Aerobic culture [[id number]] Collected:  05/09/19 2008    Order Status:  Completed Specimen:  Joint Fluid from Hand, Left Updated:  05/13/19 0804     Aerobic Bacterial Culture No growth    Aerobic culture [[id number]] Collected:  05/10/19 1503    Order Status:  Completed Specimen:  Wound from Toe, Right Foot Updated:  05/12/19 0909     Aerobic Bacterial Culture --     STREPTOCOCCUS GROUP C  Many  Beta-hemolytic streptococci are routinely susceptible to   penicillins,cephalosporins and carbapenems.  Susceptibility testing not routinely performed       Aerobic Bacterial Culture --     GRAM NEGATIVE YAIMA  Few  Identification and susceptibility pending  Skin lyndsey also present      Urine culture [[id number]] Collected:  05/10/19 1630    Order Status:  Completed Specimen:  Urine Updated:  05/12/19 0656     Urine Culture, Routine No significant growth    Narrative:       yellow and gray tube,one orange top cup  Preferred Collection Type->Urine, Clean Catch    AFB  Culture & Smear [[id number]] Collected:  05/09/19 2003    Order Status:  Completed Specimen:  Joint Fluid from Wrist, Left Updated:  05/10/19 2127     AFB Culture & Smear Culture in progress     AFB CULTURE STAIN No acid fast bacilli seen.    AFB Culture & Smear [[id number]] Collected:  05/09/19 2008    Order Status:  Completed Specimen:  Joint Fluid from Hand, Left Updated:  05/10/19 2127     AFB Culture & Smear Culture in progress     AFB CULTURE STAIN No acid fast bacilli seen.    Culture, Anaerobic [[id number]] Collected:  05/09/19 2008    Order Status:  Completed Specimen:  Joint Fluid from Hand, Left Updated:  05/10/19 0737     Anaerobic Culture Culture in progress    Culture, Anaerobic [[id number]] Collected:  05/09/19 2003    Order Status:  Completed Specimen:  Joint Fluid from Wrist, Left Updated:  05/10/19 0737     Anaerobic Culture Culture in progress    Gram stain [[id number]] Collected:  05/09/19 2008    Order Status:  Completed Specimen:  Joint Fluid from Hand, Left Updated:  05/09/19 2111     Gram Stain Result Rare WBC's      No organisms seen    Gram stain [[id number]] Collected:  05/09/19 2003    Order Status:  Completed Specimen:  Joint Fluid from Wrist, Left Updated:  05/09/19 2109     Gram Stain Result Rare WBC's      No organisms seen    Fungus culture [[id number]] Collected:  05/09/19 2008    Order Status:  Sent Specimen:  Joint Fluid from Hand, Left Updated:  05/09/19 2028    Fungus culture [[id number]] Collected:  05/09/19 2003    Order Status:  Sent Specimen:  Joint Fluid from Wrist, Left Updated:  05/09/19 2026          Significant Imaging: I have reviewed all pertinent imaging results/findings within the past 24 hours.

## 2019-05-13 NOTE — SUBJECTIVE & OBJECTIVE
Interval History:     Review of Systems   Constitutional: Negative for chills and fever.   Eyes: Negative for photophobia and visual disturbance.   Respiratory: Negative for cough and shortness of breath.    Cardiovascular: Negative for chest pain, palpitations and leg swelling.   Gastrointestinal: Negative for abdominal pain, constipation and diarrhea.   Endocrine: Negative for cold intolerance and heat intolerance.   Genitourinary: Negative for dysuria and hematuria.   Musculoskeletal: Negative for myalgias.   Skin: Positive for wound (lower extermities). Negative for rash.   Neurological: Negative for weakness and headaches.   Hematological: Negative for adenopathy. Does not bruise/bleed easily.   Psychiatric/Behavioral: Negative for agitation and behavioral problems.     Objective:     Vital Signs (Most Recent):  Temp: 97.5 °F (36.4 °C) (05/13/19 1155)  Pulse: (!) 57 (05/13/19 1155)  Resp: 18 (05/13/19 1155)  BP: (!) 147/68 (05/13/19 1155)  SpO2: 98 % (05/13/19 1155) Vital Signs (24h Range):  Temp:  [97.5 °F (36.4 °C)-98.3 °F (36.8 °C)] 97.5 °F (36.4 °C)  Pulse:  [54-64] 57  Resp:  [14-18] 18  SpO2:  [97 %-99 %] 98 %  BP: (136-171)/(62-73) 147/68     Weight: 127 kg (279 lb 15.7 oz)  Body mass index is 43.85 kg/m².    Intake/Output Summary (Last 24 hours) at 5/13/2019 1243  Last data filed at 5/13/2019 0515  Gross per 24 hour   Intake --   Output 1600 ml   Net -1600 ml      Physical Exam   Constitutional: She is oriented to person, place, and time. She appears well-developed and well-nourished. No distress.   Cardiovascular: Normal rate, regular rhythm, normal heart sounds and intact distal pulses.   Pulmonary/Chest: Effort normal and breath sounds normal. She has no wheezes.   Abdominal: Soft. Bowel sounds are normal. She exhibits no distension. There is no tenderness. There is no guarding.   Neurological: She is alert and oriented to person, place, and time.   Skin: Skin is warm and dry. She is not diaphoretic.  There is erythema.   Bilateral feet and ankles with dressings and bandages in place.L    Patient noted to have erythema in skin folds of abdomen and groin    Psychiatric: She has a normal mood and affect. Her behavior is normal. Judgment and thought content normal.       Significant Labs:   CBC:   Recent Labs   Lab 05/12/19 0336 05/13/19 0452   WBC 5.68 5.29   HGB 10.4* 9.8*   HCT 32.7* 30.7*    314     CMP:   Recent Labs   Lab 05/12/19 0336 05/13/19 0452    139   K 4.2 4.3    107   CO2 23 22*   * 183*   BUN 30* 32*   CREATININE 1.2 1.3   CALCIUM 9.1 9.3   ANIONGAP 9 10   EGFRNONAA 44.0* 39.9*       Significant Imaging: I have reviewed all pertinent imaging results/findings within the past 24 hours.

## 2019-05-13 NOTE — PROGRESS NOTES
[facility] Hospital Medicine  Progress Note    Patient Name: Sherin Ortiz  MRN: [id number]  Patient Class: IP- Inpatient   Admission Date: 5/9/2019  Length of Stay: 2 days  Attending Physician: JADA Alba MD  Primary Care Provider: Ame Vasquez MD    [facility] Medicine Team: [facility] HOSP MED F Breann Singh NP    Subjective:     Principal Problem:Osteomyelitis of toe    HPI:  Ms. Ortiz is a 73 y/o F with a PMH of T2DM (A1c 9.8), HTN, HLD, and ulcers of the lower extremities (recently admitted to hospital 03/22/2019 for cellulitis of L heel ulcer, completed tx with Vancomycin 04/12/2019, followed by  wound care) who is being admitted to hospital medicine observation for L hand edema. Patient reports L hand edema with associated pain and erythema with onset early this morning. Patient reports numerous episodes of L hand edema over the past few years which the patient attributes to a blown vein received during a missed IV stick a number of years ago. However, today's episode is worse than baseline. Pain is severe, rated 10/10. No recent falls or trauma. Patient has been taking care of her bed-bound sister and having to push her sister frequently from side to side for adjustments and bed pan usage, and therefore her L hand has been weaker and more painful than baseline. Patient endorses difficulty ranging the digits on the left hand. Patient denies fever, chills, CP, SOB, N/V/D, extremity numbness, or extremity weakness.     Labs in ED notable for WBC 7.15, sed rate 104, CRP 98.4, procal 0.08, glucose 333. Vitals notable for /80 mmHg, Temp 98.6 F, pulse 80 bpm. MRI of hand and wrist: fluid distention and synovial enhancement of the 4th compartment of the extensor tendons consistent with tenosynovitis, diffuse skin soft tissue edema and enhancement especially dorsally could indicate cellulitis.  No abscess. Ortho consulted and wrist joint aspirated with analysis not concerning for  septic arthritis.  Also aspirated dorsal fluctuance and sent for culture. This was bloody fluid, not purulent. Recommend admission to medicine for IV abx and elevation in splint.     Hospital Course:  Patient admitted to hospital medicine for left hand cellulitis. Patient found to have gout. Patient seen by podiatry; MRI ordered and acute osteomyelitis. ID consulted. Recommend following cultures and will tailor abx accordingly.     Interval History:     Review of Systems   Constitutional: Negative for chills and fever.   Eyes: Negative for photophobia and visual disturbance.   Respiratory: Negative for cough and shortness of breath.    Cardiovascular: Negative for chest pain, palpitations and leg swelling.   Gastrointestinal: Negative for abdominal pain, constipation and diarrhea.   Endocrine: Negative for cold intolerance and heat intolerance.   Genitourinary: Negative for dysuria and hematuria.   Musculoskeletal: Negative for myalgias.   Skin: Positive for wound (lower extermities). Negative for rash.   Neurological: Negative for weakness and headaches.   Hematological: Negative for adenopathy. Does not bruise/bleed easily.   Psychiatric/Behavioral: Negative for agitation and behavioral problems.     Objective:     Vital Signs (Most Recent):  Temp: 97.5 °F (36.4 °C) (05/13/19 1155)  Pulse: (!) 57 (05/13/19 1155)  Resp: 18 (05/13/19 1155)  BP: (!) 147/68 (05/13/19 1155)  SpO2: 98 % (05/13/19 1155) Vital Signs (24h Range):  Temp:  [97.5 °F (36.4 °C)-98.3 °F (36.8 °C)] 97.5 °F (36.4 °C)  Pulse:  [54-64] 57  Resp:  [14-18] 18  SpO2:  [97 %-99 %] 98 %  BP: (136-171)/(62-73) 147/68     Weight: 127 kg (279 lb 15.7 oz)  Body mass index is 43.85 kg/m².    Intake/Output Summary (Last 24 hours) at 5/13/2019 1243  Last data filed at 5/13/2019 0515  Gross per 24 hour   Intake --   Output 1600 ml   Net -1600 ml      Physical Exam   Constitutional: She is oriented to person, place, and time. She appears well-developed and  well-nourished. No distress.   Cardiovascular: Normal rate, regular rhythm, normal heart sounds and intact distal pulses.   Pulmonary/Chest: Effort normal and breath sounds normal. She has no wheezes.   Abdominal: Soft. Bowel sounds are normal. She exhibits no distension. There is no tenderness. There is no guarding.   Neurological: She is alert and oriented to person, place, and time.   Skin: Skin is warm and dry. She is not diaphoretic. There is erythema.   Bilateral feet and ankles with dressings and bandages in place.L    Patient noted to have erythema in skin folds of abdomen and groin    Psychiatric: She has a normal mood and affect. Her behavior is normal. Judgment and thought content normal.       Significant Labs:   CBC:   Recent Labs   Lab 05/12/19 0336 05/13/19  0452   WBC 5.68 5.29   HGB 10.4* 9.8*   HCT 32.7* 30.7*    314     CMP:   Recent Labs   Lab 05/12/19 0336 05/13/19 0452    139   K 4.2 4.3    107   CO2 23 22*   * 183*   BUN 30* 32*   CREATININE 1.2 1.3   CALCIUM 9.1 9.3   ANIONGAP 9 10   EGFRNONAA 44.0* 39.9*       Significant Imaging: I have reviewed all pertinent imaging results/findings within the past 24 hours.    Assessment/Plan:      * Osteomyelitis of toe  MRI findings in keeping with acute osteomyelitis involving the right great toe distal phalanx with overlying soft tissue ulcer.  - podiatry following  Dressed foot with betadine wet to dry today,  Nursing to do hydrofera MW, orders in.  -Wound care on board managing LE wounds with hydrofera, agree with hydrofera, recommend continue local wound care  Medically manage osteo at this time   - ID consulted; continue abx and follow cultures  - patient continues on levoquin and vanc currently; will adjust as necessary     Cellulitis of left hand  - ESR/CRP elevated on admit, VSS, WBC 7.15  - MRI of L hand and wrist consistent with tenosynovitis and cellulitis   - ortho consulted, recs appreciated:   - wrist joint  aspirated with analysis not concerning for septic arthritis   - dorsal fluctuance aspirated and urate crystals noted in the fluid; prednisone initiated    - admit for IV abx, elevation in splint   - NPO after midnight as precaution  - orders placed for prn pain meds  - 2 g Ceftriaxone IV and 1750 mg Vancomycin IV administered, will continue-deescalated the rocephin     Uncontrolled type 2 diabetes mellitus with stage 3 chronic kidney disease, with long-term current use of insulin   - glucose in   - home medications: Glimepiride 1 mg 2 tablets daily at lunchtime, Lantus 35 units qhs  - hospital regimen: insulin detemir 18u qhs + mod dose SSI for NPO patients  -add 4 units aspart TIDWM     Hyperlipidemia LDL goal <100  - continue Lipitor 40 mg qd    PAOD (peripheral arterial occlusive disease)  - continue Plavix 75 mg qd, ASA 81 mg qd    Anemia of chronic disease  - Hgb on admit 10.0, patient's baseline 9.5-10.5  - monitor with daily CBC    Ulcer of heel and midfoot  - patient recently admitted to hospital 03/22/2019 for cellulitis of L heel ulcer, completed tx with Vancomycin on 04/12/2019  - followed by wound care and  wound care  - will consult wound care; assistance appreciated  - wound care concern for pseudomonas-levoquin initiated       VTE Risk Mitigation (From admission, onward)        Ordered     Place sequential compression device  Until discontinued      05/09/19 2339     IP VTE HIGH RISK PATIENT  Once      05/09/19 2339     Place AUTUMN hose  Until discontinued      05/09/19 2339              Breann Singh NP  Department of Hospital Medicine   [facility]

## 2019-05-13 NOTE — ASSESSMENT & PLAN NOTE
74 yro F with PMH of IDDM2 (AIC 9.8), HTN, chronic LLE ulcers (admitted [facility] 3/22 for cellulitis, completed vanc and meropenem course 4/12), and CKD3 who presents to [facility] [place] [place] 5/9/19 with L hand edema and pain 2/2 cellulitis and gout, started on prednisone. She has chronic LLE ulcers no longer causing her any active issues, as well as new RLE ulcer on great toe present for 1 week. Podiatry evaluated R great toe and obtained a culture, as well as an MRI 5/10, which showed osteomyelitis. Previously treated for cellulitis of chronic LLE ulcers (Wound Cx polymicrobial: MRSA, enterobacter aerogenes, enterobacter cloacae, pseudomonas, morganella, proteus, klebsiella) during hospital admission in March, finished vanc and mo course 4/12. At risk for multi-drug resistant organism. R toe culture obtained by podiatry is + group C strep and proteus     Recommendations  - d/c'ed vancomycin  - Wound Cx 5/10 from podiatry- group C strep and pan-sensitive proteus  - narrow abx to cefazolin 2 g every 8 hours for 6 weeks   - out pt FU in ID clinic and weekly labs CBC, CMP, ESR, CRP weekly faxed to ID at [phone]

## 2019-05-14 ENCOUNTER — TELEPHONE (OUTPATIENT)
Dept: INFECTIOUS DISEASES | Facility: CLINIC | Age: 74
End: 2019-05-14

## 2019-05-14 VITALS
RESPIRATION RATE: 18 BRPM | TEMPERATURE: 96 F | DIASTOLIC BLOOD PRESSURE: 68 MMHG | WEIGHT: 280 LBS | HEIGHT: 67 IN | OXYGEN SATURATION: 97 % | HEART RATE: 68 BPM | BODY MASS INDEX: 43.95 KG/M2 | SYSTOLIC BLOOD PRESSURE: 172 MMHG

## 2019-05-14 PROBLEM — M10.9 ACUTE GOUT OF LEFT HAND: Status: ACTIVE | Noted: 2019-05-09

## 2019-05-14 LAB
ANION GAP SERPL CALC-SCNC: 8 MMOL/L (ref 8–16)
ANISOCYTOSIS BLD QL SMEAR: SLIGHT
BACTERIA SPEC ANAEROBE CULT: NORMAL
BASOPHILS # BLD AUTO: 0.03 K/UL (ref 0–0.2)
BASOPHILS NFR BLD: 0.6 % (ref 0–1.9)
BUN SERPL-MCNC: 32 MG/DL (ref 8–23)
CALCIUM SERPL-MCNC: 8.6 MG/DL (ref 8.7–10.5)
CHLORIDE SERPL-SCNC: 109 MMOL/L (ref 95–110)
CO2 SERPL-SCNC: 22 MMOL/L (ref 23–29)
CREAT SERPL-MCNC: 1.1 MG/DL (ref 0.5–1.4)
DIFFERENTIAL METHOD: ABNORMAL
EOSINOPHIL # BLD AUTO: 0.1 K/UL (ref 0–0.5)
EOSINOPHIL NFR BLD: 2.1 % (ref 0–8)
ERYTHROCYTE [DISTWIDTH] IN BLOOD BY AUTOMATED COUNT: 14.5 % (ref 11.5–14.5)
EST. GFR  (AFRICAN AMERICAN): 56.4 ML/MIN/1.73 M^2
EST. GFR  (NON AFRICAN AMERICAN): 48.9 ML/MIN/1.73 M^2
GLUCOSE SERPL-MCNC: 80 MG/DL (ref 70–110)
HCT VFR BLD AUTO: 31.5 % (ref 37–48.5)
HGB BLD-MCNC: 10 G/DL (ref 12–16)
IMM GRANULOCYTES # BLD AUTO: 0.03 K/UL (ref 0–0.04)
IMM GRANULOCYTES NFR BLD AUTO: 0.6 % (ref 0–0.5)
LYMPHOCYTES # BLD AUTO: 1.7 K/UL (ref 1–4.8)
LYMPHOCYTES NFR BLD: 35.3 % (ref 18–48)
MCH RBC QN AUTO: 28.6 PG (ref 27–31)
MCHC RBC AUTO-ENTMCNC: 31.7 G/DL (ref 32–36)
MCV RBC AUTO: 90 FL (ref 82–98)
MONOCYTES # BLD AUTO: 0.5 K/UL (ref 0.3–1)
MONOCYTES NFR BLD: 9.8 % (ref 4–15)
NEUTROPHILS # BLD AUTO: 2.4 K/UL (ref 1.8–7.7)
NEUTROPHILS NFR BLD: 51.6 % (ref 38–73)
NRBC BLD-RTO: 0 /100 WBC
PLATELET # BLD AUTO: 298 K/UL (ref 150–350)
PLATELET BLD QL SMEAR: ABNORMAL
PMV BLD AUTO: 9.3 FL (ref 9.2–12.9)
POCT GLUCOSE: 161 MG/DL (ref 70–110)
POCT GLUCOSE: 206 MG/DL (ref 70–110)
POCT GLUCOSE: 231 MG/DL (ref 70–110)
POCT GLUCOSE: 245 MG/DL (ref 70–110)
POCT GLUCOSE: 73 MG/DL (ref 70–110)
POTASSIUM SERPL-SCNC: 4.3 MMOL/L (ref 3.5–5.1)
RBC # BLD AUTO: 3.5 M/UL (ref 4–5.4)
SODIUM SERPL-SCNC: 139 MMOL/L (ref 136–145)
WBC # BLD AUTO: 4.68 K/UL (ref 3.9–12.7)

## 2019-05-14 PROCEDURE — 99239 PR HOSPITAL DISCHARGE DAY,>30 MIN: ICD-10-PCS | Mod: HCNC,,, | Performed by: PHYSICIAN ASSISTANT

## 2019-05-14 PROCEDURE — 97530 THERAPEUTIC ACTIVITIES: CPT | Mod: HCNC

## 2019-05-14 PROCEDURE — 25000003 PHARM REV CODE 250: Mod: HCNC | Performed by: PHYSICIAN ASSISTANT

## 2019-05-14 PROCEDURE — C1751 CATH, INF, PER/CENT/MIDLINE: HCPCS | Mod: HCNC

## 2019-05-14 PROCEDURE — 36415 COLL VENOUS BLD VENIPUNCTURE: CPT | Mod: HCNC

## 2019-05-14 PROCEDURE — A4216 STERILE WATER/SALINE, 10 ML: HCPCS | Mod: HCNC | Performed by: INTERNAL MEDICINE

## 2019-05-14 PROCEDURE — 36569 INSJ PICC 5 YR+ W/O IMAGING: CPT | Mod: HCNC

## 2019-05-14 PROCEDURE — 85025 COMPLETE CBC W/AUTO DIFF WBC: CPT | Mod: HCNC

## 2019-05-14 PROCEDURE — 97162 PT EVAL MOD COMPLEX 30 MIN: CPT | Mod: HCNC

## 2019-05-14 PROCEDURE — 76937 US GUIDE VASCULAR ACCESS: CPT | Mod: HCNC

## 2019-05-14 PROCEDURE — 25000003 PHARM REV CODE 250: Mod: HCNC | Performed by: INTERNAL MEDICINE

## 2019-05-14 PROCEDURE — 99233 PR SUBSEQUENT HOSPITAL CARE,LEVL III: ICD-10-PCS | Mod: HCNC,,, | Performed by: PODIATRIST

## 2019-05-14 PROCEDURE — 63600175 PHARM REV CODE 636 W HCPCS: Mod: HCNC | Performed by: NURSE PRACTITIONER

## 2019-05-14 PROCEDURE — 25000003 PHARM REV CODE 250: Mod: HCNC | Performed by: NURSE PRACTITIONER

## 2019-05-14 PROCEDURE — 97535 SELF CARE MNGMENT TRAINING: CPT | Mod: HCNC

## 2019-05-14 PROCEDURE — 80048 BASIC METABOLIC PNL TOTAL CA: CPT | Mod: HCNC

## 2019-05-14 PROCEDURE — 99233 SBSQ HOSP IP/OBS HIGH 50: CPT | Mod: HCNC,,, | Performed by: PODIATRIST

## 2019-05-14 PROCEDURE — 97165 OT EVAL LOW COMPLEX 30 MIN: CPT | Mod: HCNC

## 2019-05-14 PROCEDURE — 99239 HOSP IP/OBS DSCHRG MGMT >30: CPT | Mod: HCNC,,, | Performed by: PHYSICIAN ASSISTANT

## 2019-05-14 PROCEDURE — 63600175 PHARM REV CODE 636 W HCPCS: Mod: HCNC | Performed by: PHYSICIAN ASSISTANT

## 2019-05-14 RX ORDER — CEFAZOLIN SODIUM 1 G/3ML
2 INJECTION, POWDER, FOR SOLUTION INTRAMUSCULAR; INTRAVENOUS EVERY 8 HOURS
Qty: 180000 MG | Refills: 1
Start: 2019-05-14 | End: 2019-06-13

## 2019-05-14 RX ORDER — COLCHICINE 0.6 MG/1
0.6 TABLET ORAL 2 TIMES DAILY
Qty: 60 TABLET | Refills: 3 | Status: SHIPPED | OUTPATIENT
Start: 2019-05-14 | End: 2019-11-06

## 2019-05-14 RX ORDER — CEFEPIME HYDROCHLORIDE 2 G/1
2 INJECTION, POWDER, FOR SOLUTION INTRAVENOUS EVERY 8 HOURS
Qty: 228 G | Refills: 0
Start: 2019-05-14 | End: 2019-05-14 | Stop reason: HOSPADM

## 2019-05-14 RX ORDER — CEFAZOLIN SODIUM 1 G/3ML
2 INJECTION, POWDER, FOR SOLUTION INTRAMUSCULAR; INTRAVENOUS
Status: DISCONTINUED | OUTPATIENT
Start: 2019-05-14 | End: 2019-05-14

## 2019-05-14 RX ORDER — SODIUM CHLORIDE 0.9 % (FLUSH) 0.9 %
10 SYRINGE (ML) INJECTION
Status: DISCONTINUED | OUTPATIENT
Start: 2019-05-14 | End: 2019-05-14 | Stop reason: HOSPADM

## 2019-05-14 RX ORDER — SODIUM CHLORIDE 0.9 % (FLUSH) 0.9 %
10 SYRINGE (ML) INJECTION EVERY 6 HOURS
Status: DISCONTINUED | OUTPATIENT
Start: 2019-05-14 | End: 2019-05-14 | Stop reason: HOSPADM

## 2019-05-14 RX ORDER — CEFAZOLIN SODIUM 1 G/3ML
2 INJECTION, POWDER, FOR SOLUTION INTRAMUSCULAR; INTRAVENOUS
Status: DISCONTINUED | OUTPATIENT
Start: 2019-05-14 | End: 2019-05-14 | Stop reason: HOSPADM

## 2019-05-14 RX ADMIN — CEFAZOLIN 2 G: 1 INJECTION, POWDER, FOR SOLUTION INTRAMUSCULAR; INTRAVENOUS at 02:05

## 2019-05-14 RX ADMIN — MICONAZOLE NITRATE: 20 OINTMENT TOPICAL at 10:05

## 2019-05-14 RX ADMIN — INSULIN ASPART 4 UNITS: 100 INJECTION, SOLUTION INTRAVENOUS; SUBCUTANEOUS at 10:05

## 2019-05-14 RX ADMIN — INSULIN ASPART 4 UNITS: 100 INJECTION, SOLUTION INTRAVENOUS; SUBCUTANEOUS at 04:05

## 2019-05-14 RX ADMIN — CEFEPIME 2 G: 2 INJECTION, POWDER, FOR SOLUTION INTRAVENOUS at 10:05

## 2019-05-14 RX ADMIN — ASPIRIN 81 MG CHEWABLE TABLET 81 MG: 81 TABLET CHEWABLE at 10:05

## 2019-05-14 RX ADMIN — COLCHICINE 0.6 MG: 0.6 TABLET, FILM COATED ORAL at 10:05

## 2019-05-14 RX ADMIN — Medication 10 ML: at 04:05

## 2019-05-14 NOTE — ASSESSMENT & PLAN NOTE
MRI findings in keeping with acute osteomyelitis involving the right great toe distal phalanx with overlying soft tissue ulcer.  - podiatry following  Dressed foot with betadine wet to dry today,  Nursing to do hydrofera MW, orders in.  -Wound care on board managing LE wounds with hydrofera, agree with hydrofera, recommend continue local wound care  Medically manage osteo at this time   - ID consulted; continue abx and follow cultures  - patient denies amputation  - patient continues on levoquin and vanc-->  - culture with Group c strep and pan sensitive proteus  - cefazolin 2g q8h X 6 weeks  - PICC line placed prior to discharge  - weekly CBC, CMP, CRP send to ID clinic to monitor improvement and renal function  - follow up with ID in 3-4 weeks  - patient discharged with cefaz

## 2019-05-14 NOTE — TELEPHONE ENCOUNTER
----- Message from Ernesto Sterling MD sent at 5/14/2019  3:50 PM CDT -----  Contact: [facility]  Yes.  Okay to switch to continuous infusion.    ----- Message -----  From: Alona Mendoza MA  Sent: 5/14/2019   1:53 PM  To: Ernesto Sterling MD    Pt has a hospital follow up to see you on 6/13/19.Aubrey at [facility] wants to know if he can switch the pt from cefazolin 2 grams q 8 to 6 grams continuous, please advise

## 2019-05-14 NOTE — DISCHARGE SUMMARY
[facility] Hospital Medicine  Discharge Summary      Patient Name: Sherin Ortiz  MRN: [id number]  Admission Date: 5/9/2019  Hospital Length of Stay: 3 days  Discharge Date and Time: 5/14/2019  8:32 PM  Attending Physician: Mae Dhillon MD   Discharging Provider: Lexis Melgar PA-C  Primary Care Provider: Ame Vasquez MD  [facility] Medicine Team: [facility] F Lexis Melgar PA-C    HPI:   Ms. Ortiz is a 75 y/o F with a PMH of T2DM (A1c 9.8), HTN, HLD, and ulcers of the lower extremities (recently admitted to hospital 03/22/2019 for cellulitis of L heel ulcer, completed tx with Vancomycin 04/12/2019, followed by  wound care) who is being admitted to hospital medicine observation for L hand edema. Patient reports L hand edema with associated pain and erythema with onset early this morning. Patient reports numerous episodes of L hand edema over the past few years which the patient attributes to a blown vein received during a missed IV stick a number of years ago. However, today's episode is worse than baseline. Pain is severe, rated 10/10. No recent falls or trauma. Patient has been taking care of her bed-bound sister and having to push her sister frequently from side to side for adjustments and bed pan usage, and therefore her L hand has been weaker and more painful than baseline. Patient endorses difficulty ranging the digits on the left hand. Patient denies fever, chills, CP, SOB, N/V/D, extremity numbness, or extremity weakness.     Labs in ED notable for WBC 7.15, sed rate 104, CRP 98.4, procal 0.08, glucose 333. Vitals notable for /80 mmHg, Temp 98.6 F, pulse 80 bpm. MRI of hand and wrist: fluid distention and synovial enhancement of the 4th compartment of the extensor tendons consistent with tenosynovitis, diffuse skin soft tissue edema and enhancement especially dorsally could indicate cellulitis.  No abscess. Ortho consulted and wrist joint aspirated with  analysis not concerning for septic arthritis.  Also aspirated dorsal fluctuance and sent for culture. This was bloody fluid, not purulent. Recommend admission to medicine for IV abx and elevation in splint.     Procedure(s) (LRB):  INCISION AND DRAINAGE, HAND - dorsal wrist - possible arthrotomy - hand pan - hand table on stretcher - hand drapes - cysto tubing - cultures - 4x15in splint materials (Left)      Hospital Course:   Patient admitted to hospital medicine for left hand cellulitis. Patient found to have gout. Started on prednisone and colchicine with improvement. Patient seen by podiatry for right toe wound; MRI ordered and acute osteomyelitis. ID consulted. Wound cultures with Streptococcus group c and pan sensitive proteus mirabilis. Patient denied amputation of right toe. ID recommended cefazolin 2g IV q8h X 6 weeks. PICC line placed. Patient discharged with HH with PT/OT/ nurse to administer IV antibiotics and send weekly CBC, CMP, CRP to ID clinic. Patient to follow up with PCP in 1 week and ID in 3-4 weeks. Patient verbalized understanding. All questions answered.     Consults:   Consults (From admission, onward)        Status Ordering Provider     Inpatient consult to Infectious Diseases  Once     Provider:  (Not yet assigned)    Completed RACHEL RUTHERFORD     Inpatient consult to Orthopedic Surgery  Once     Provider:  (Not yet assigned)    Completed JAVY SCHUSTER     Inpatient consult to PICC team ([facility])  Once     Provider:  (Not yet assigned)    Completed DELROY CASTAÑEDA     Inpatient consult to Podiatry  Once     Provider:  (Not yet assigned)    Completed RACHEL RUTHERFORD     IP consult to case management  Once     Provider:  (Not yet assigned)    Acknowledged JADA GUZMAN          * Osteomyelitis of toe  MRI findings in keeping with acute osteomyelitis involving the right great toe distal phalanx with overlying soft tissue ulcer.  - podiatry following  Dressed foot with betadine wet to  dry today,  Nursing to do hydrofera [facility], orders in.  -Wound care on board managing LE wounds with hydrofera, agree with hydrofera, recommend continue local wound care  Medically manage osteo at this time   - ID consulted; continue abx and follow cultures  - patient denies amputation  - patient continues on levoquin and vanc-->  - culture with Group c strep and pan sensitive proteus  - cefazolin 2g q8h X 6 weeks  - PICC line placed prior to discharge  - weekly CBC, CMP, CRP send to ID clinic to monitor improvement and renal function  - follow up with ID in 3-4 weeks  - patient discharged with cefaz    Acute gout of left hand  - ESR/CRP elevated on admit, VSS, WBC 7.15  - MRI of L hand and wrist consistent with tenosynovitis and cellulitis   - ortho consulted, recs appreciated:   - wrist joint aspirated with analysis not concerning for septic arthritis   - dorsal fluctuance aspirated and urate crystals noted in the fluid; prednisone initiated    - admit for IV abx, elevation in splint   - NPO after midnight as precaution  - orders placed for prn pain meds  - 2 g Ceftriaxone IV and 1750 mg Vancomycin IV administered, will continue-deescalated the rocephin- antibiotics discontinued  - completed 3 day course of prednisone, continue colchicine .6mg BID daily until resolves  - follow up with PCP in 1 week    PAOD (peripheral arterial occlusive disease)  - continue Plavix 75 mg qd, ASA 81 mg qd    Uncontrolled type 2 diabetes mellitus with stage 3 chronic kidney disease, with long-term current use of insulin   - glucose in   - home medications: Glimepiride 1 mg 2 tablets daily at lunchtime, Lantus 35 units qhs  - hospital regimen: insulin detemir 18u qhs + mod dose SSI for NPO patients  -add 4 units aspart TIDWM   - discharged with home meds    Anemia of chronic disease  - Hgb on admit 10.0, patient's baseline 9.5-10.5  - monitor with daily CBC    Hyperlipidemia LDL goal <100  - continue Lipitor 40 mg qd    Ulcer of  heel and midfoot  - patient recently admitted to hospital 03/22/2019 for cellulitis of L heel ulcer, completed tx with Vancomycin on 04/12/2019  - followed by wound care and HH wound care  - will consult wound care; assistance appreciated  - wound care concern for pseudomonas-levoquin initiated changed to cefepime      Final Active Diagnoses:    Diagnosis Date Noted POA    PRINCIPAL PROBLEM:  Osteomyelitis of toe [M86.9] 05/11/2019 Yes    Osteomyelitis [M86.9] 05/11/2019 Yes    Venous stasis ulcers of both lower extremities [I83.019, I83.029, L97.919, L97.929] 05/10/2019 Yes    Dermatitis associated with moisture [L30.8] 05/10/2019 Yes    Toe ulcer [L97.509] 05/10/2019 Yes    Acute gout of left hand [M10.9] 05/09/2019 Yes    PAOD (peripheral arterial occlusive disease) [I77.9] 07/06/2017 Yes    Uncontrolled type 2 diabetes mellitus with stage 3 chronic kidney disease, with long-term current use of insulin [E11.22, E11.65, N18.3, Z79.4] 11/08/2016 Not Applicable     Chronic    Anemia of chronic disease [D63.8] 09/18/2015 Yes    Hyperlipidemia LDL goal <100 [E78.5] 08/14/2012 Yes     Chronic    Ulcer of heel and midfoot [L97.409] 07/24/2012 Yes      Problems Resolved During this Admission:       Discharged Condition: good    Disposition: Home or Self Care    Follow Up:  Follow-up Information     Ame Vasquez MD In 1 week.    Specialty:  Internal Medicine  Contact information:  [address]  [place] [id number] [phone]                 Patient Instructions:      CBC Without Differential   Standing Status: Future Standing Exp. Date: 07/12/20     Diet diabetic     Notify your health care provider if you experience any of the following:  temperature >100.4     Notify your health care provider if you experience any of the following:  severe uncontrolled pain     Notify your health care provider if you experience any of the following:  redness, tenderness, or signs of infection (pain, swelling,  redness, odor or green/yellow discharge around incision site)     Notify your health care provider if you experience any of the following:  worsening rash     Activity as tolerated       Significant Diagnostic Studies: Labs:   CMP   Recent Labs   Lab 05/13/19 0452 05/14/19 0412    139   K 4.3 4.3    109   CO2 22* 22*   * 80   BUN 32* 32*   CREATININE 1.3 1.1   CALCIUM 9.3 8.6*   ANIONGAP 10 8   ESTGFRAFRICA 46.0* 56.4*   EGFRNONAA 39.9* 48.9*    and CBC   Recent Labs   Lab 05/13/19 0452 05/14/19 0412   WBC 5.29 4.68   HGB 9.8* 10.0*   HCT 30.7* 31.5*    298     Microbiology: Wound Culture: positive for Group C strep, proteus mirabilis     Contains abnormal data MRI Foot (Forefoot) Right Without Contrast   Order: [id number]   Status:  Final result   Visible to patient:  No (Not Released) Next appt:  05/22/2019 at 11:00 AM in Wound Care (Akosua Alvarenga NP)   Details     Reading Physician Reading Date Result Priority   Lucila Soni MD 5/10/2019       Narrative     EXAMINATION:  MRI FOOT (FOREFOOT) RIGHT WITHOUT CONTRAST    CLINICAL HISTORY:  Open wound of ankle, foot and toes;    TECHNIQUE:  Multiplanar, multisequence MR images of the right forefoot were obtained without the administration of IV contrast.    COMPARISON:  Right foot radiograph 05/10/2019    FINDINGS:  There are multiple regions of susceptibility artifact coinciding with previously identified linear metallic densities seen throughout the forefoot on prior radiograph.  There is a soft tissue defect/ulcer overlying the great toe distal phalanx with associated T1 hypointense marrow replacement and osseous edema involving consistent with acute osteomyelitis.    There is abnormal marrow edema and T1 hypointensity involving the proximal phalanx of the 4th toe (series 5, image 7).  No associated adjacent soft tissue wound is appreciated.  There is apparent deformity of the phalanx suspicious for minimally displaced fracture.   Similar findings are noted of the adjacent proximal phalanx of the 5th toe suspicious for minimally displaced fracture.  There is remote traumatic deformity of the 5th metatarsal.      Impression       1. Findings in keeping with acute osteomyelitis involving the great toe distal phalanx with overlying soft tissue ulcer.  2. Abnormal marrow edema involving the proximal phalanges of the 4th and 5th toes with apparent deformity suspicious for recent minimally displaced fractures.  Infectious process such as osteomyelitis felt less likely given lack of adjacent soft tissue wound, although this is not entirely excluded.  Correlation with patient history of any recent trauma is advised.  3. Multiple regions of susceptibility artifact coinciding with previously identified linear metallic densities seen on prior radiograph.  This report was flagged in Epic as abnormal.      Electronically signed by: Lucila Soni MD  Date: 05/10/2019  Time: 23:36             Last Resulted: 05/10/19 23:36           US Lower Extremity Arteries Bilateral   Order: [id number]   Status:  Final result   Visible to patient:  No (Not Released) Next appt:  05/22/2019 at 11:00 AM in Wound Care (Akosua Alvarenga NP)   Details     Reading Physician Reading Date Result Priority   Khoi Ramirez MD 5/11/2019    Darrell Seals MD 5/11/2019       Narrative     EXAMINATION:  US LOWER EXTREMITY ARTERIES BILATERAL    CLINICAL HISTORY:  PAD    TECHNIQUE:  Bilateral ankle-brachial indices as well as bilateral spectral, color and grayscale images of the large arteries of both lower extremities were performed.    COMPARISON:  Ultrasound of the bilateral lower extremity arteries 03/25/2019.    FINDINGS:  Atherosclerosis of the bilateral lower extremities. No evidence of hemodynamically significant stenosis of the arterial system.  No ABIs could be obtained secondary to presence of overlying bandages in the lower extremities.    Right lower extremity arterial  waveforms and velocities as follows in cm/sec:    -CFA: 134 triphasic,    -SFA-PROX: 144 triphasic,    -SFA-MID: 118 triphasic,    -SFA-DIS: 111 triphasic,    -POPLITEAL: 71 triphasic,    -PTA: Unable to be evaluated.  Secondary to presence of overlying bandages.    -PREET: 102; triphasic.    Left lower extremity arterial waveforms and velocities as follows in cm/sec:    -CFA: 160, triphasic,    -SFA-PROX: 152, monophasic,    -SFA-MID: 117, monophasic,    -SFA-DIS: 131, monophasic,    -POPLITEAL: 90, monophasic,    -PTA: Unable to be evaluated. Secondary to presence of overlying bandages.    -PREET: 89, biphasic.      Impression       Bilateral atherosclerotic changes; left greater than the right without ultrasound evidence.    No ABIs could be obtained secondary to presence of overlying bandages in the lower extremities.    Electronically signed by resident: Khoi Ramirez  Date: 05/11/2019  Time: 09:24    Electronically signed by: Darrell Seals MD  Date: 05/11/2019  Time: 10:05             Last Resulted: 05/11/19 10:05           MRI Hand Wrist W WO Left_Rheumatoid   Order: [id number]   Status:  Final result   Visible to patient:  No (Not Released) Next appt:  05/22/2019 at 11:00 AM in Wound Care (Akosua Alvarenga NP)   Details     Reading Physician Reading Date Result Priority   Brandan Woods MD 5/9/2019       Narrative     EXAMINATION:  MRI HAND_WRIST W WO LEFT_RHEUMATOID ARTHRITIS    CLINICAL HISTORY:  Hand erythema, swelling, cellulitis suspected;Wrist erythema, swelling, cellulitis suspected;    TECHNIQUE:  Multiplanar pre and post contrast MR sequences performed.  10 cc Gadavist are injected intravenously.    COMPARISON:  Left hand films of 05/09/2019    FINDINGS:  Tendons: There is significant fluid distention extensor digitorum tendon sheath which also shows enhancement.  Findings indicate tenosynovitis of the 4th compartment.  The extensor tendons M ore overall normal and signal intensity and are sharply  defined without definite enlargement.    There is moderate fluid in the flexor digitorum profundus and superficialis tendons shows seen at the proximal extent of coverage of the exam.  The flexor tendons themselves show normal signal and are sharply defined.  Potentially these findings could indicate there tenosynovitis of the flexor compartment.    No tendon defect found.  The extensor carpi ulnaris appears intact with normal position.  The abductor pollicis longus and extensor pollicis brevis tendons appear normal.    Soft tissues: There is diffuse skin edema especially dorsally.  Some of the shows striated enhancement.  Findings could indicate cellulitis or other inflammatory process.  No abscess collection found.    Bones: No fracture or osteonecrosis.  There is mild diffuse edema of the lunate.  No definite osteonecrosis signal intensity or collapse is seen.  This edema pattern could simply be stress response.  This questionable erosion along the radial aspect of the lunate the ulnar styloid appears intact.  There appear to be erosions of the proximal aspect of the capitate.    Ligaments: The collateral ligaments of the MCPs appear normal.    Triangular fibrocartilage: There is diffuse striated increased T2 signal of the triangle fibrocartilage.  This could indicate degeneration or perhaps complex nondisplaced tearing.  The radial and ulnar attachments appear to be intact on this hand MR large field-of-view exam.  There is moderate fluid in the distal radioulnar joint with synovial enhancement.      Impression       Fluid distention and synovial enhancement of the 4th compartment of the extensor tendons consistent with tenosynovitis.    Diffuse skin soft tissue edema and enhancement especially dorsally could indicate cellulitis.  No abscess.    Mild edema of the lunate without definite osteo necrotic signal changes.  This could represent stress response or inflammation.  There are probable erosions of the  "radial aspect of the lunate and the proximal aspect of the capitate.    Irregular striated signal of the triangular fibrocartilage could indicate complex nondisplaced tearing.    The distal radioulnar joint has moderate fluid and synovial reaction.  This could be sterile rheumatoid associated joint inflammation.      Electronically signed by: Brandan Woods  Date: 05/09/2019  Time: 16:34             Last Resulted: 05/09/19 16:34               Pending Diagnostic Studies:     None         Medications:  Reconciled Home Medications:      Medication List      START taking these medications    ceFAZolin 1 gram injection  Commonly known as:  ANCEF  Inject 2,000 mg (2 g total) into the vein every 8 (eight) hours.     colchicine 0.6 mg tablet  Commonly known as:  COLCRYS  Take 1 tablet (0.6 mg total) by mouth 2 (two) times daily. Until gout pain is resolved     miconazole nitrate 2% 2 % Oint  Commonly known as:  MICOTIN  Apply topically 2 (two) times daily.        CHANGE how you take these medications    LANTUS U-100 INSULIN 100 unit/mL injection  Generic drug:  insulin glargine  INJECT 7 TO 10 UNITS SUBCUTANEOUSLY IN THE EVENING DEPENDING ON SCALE (DISCARD EACH VIAL AFTER 28 DAYS)  What changed:  See the new instructions.        CONTINUE taking these medications    ACCU-CHEK RAMIRO PLUS TEST STRP Strp  Generic drug:  blood sugar diagnostic  TEST TWICE DAILY     ACCU-CHEK SOFTCLIX LANCETS Misc  Generic drug:  lancets  Test twice daily     aspirin 81 MG Chew  Take 81 mg by mouth once daily.     atorvastatin 40 MG tablet  Commonly known as:  LIPITOR  TAKE 1 TABLET EVERY EVENING     BD INSULIN SYRINGE ULTRA-FINE 0.5 mL 30 gauge x 1/2" Syrg  Generic drug:  insulin syringe-needle U-100  USE EVERY NIGHT     clopidogrel 75 mg tablet  Commonly known as:  PLAVIX  TAKE 1 TABLET EVERY DAY        STOP taking these medications    bumetanide 1 MG tablet  Commonly known as:  BUMEX     diclofenac sodium 1 % Gel  Commonly known as:  VOLTAREN   "   vancomycin in 5 % dextrose 1.25 gram/250 mL Soln            Indwelling Lines/Drains at time of discharge:   Lines/Drains/Airways     Peripherally Inserted Central Catheter Line                 PICC Double Lumen 03/27/19 1145 right brachial 48 days         PICC Double Lumen 05/14/19 0945 right basilic less than 1 day          Drain            Female External Urinary Catheter 03/26/19 1723 48 days          Pressure Ulcer                 Pressure Ulcer Left heel Stage III -- days         Pressure Ulcer Right heel Stage III -- days         Pressure Injury 01/09/19 1513 Right medial Heel #4 Stage 3 125 days         Pressure Injury 05/10/19 0800 Left Heel Unstageable 4 days         Pressure Injury 05/10/19 0800 Left dorsal Foot Unstageable 4 days         Pressure Injury 05/10/19 0800 Right Heel Unstageable 4 days                Time spent on the discharge of patient: 36 minutes  Patient was seen and examined on the date of discharge and determined to be suitable for discharge.         Lexis Melgar PA-C  Department of Hospital Medicine  [facility]

## 2019-05-14 NOTE — ASSESSMENT & PLAN NOTE
Sherin Ortiz is a 74 y.o. female with superifical toe ulcer, no acute SOI or purulent drainage noted, appears stable.   -Imaging with OM noted, will discuss with staff  -Abx per ID, appreciate recs  -Wound dressed with iodosorb and loose kerlix secured with tape.    -nursing orders in for dressing changes  -Podiatry will follow

## 2019-05-14 NOTE — ASSESSMENT & PLAN NOTE
- ESR/CRP elevated on admit, VSS, WBC 7.15  - MRI of L hand and wrist consistent with tenosynovitis and cellulitis   - ortho consulted, recs appreciated:   - wrist joint aspirated with analysis not concerning for septic arthritis   - dorsal fluctuance aspirated and urate crystals noted in the fluid; prednisone initiated    - admit for IV abx, elevation in splint   - NPO after midnight as precaution  - orders placed for prn pain meds  - 2 g Ceftriaxone IV and 1750 mg Vancomycin IV administered, will continue-deescalated the rocephin- antibiotics discontinued  - completed 3 day course of prednisone, continue colchicine .6mg BID daily until resolves  - follow up with PCP in 1 week

## 2019-05-14 NOTE — PT/OT/SLP EVAL
Occupational Therapy   Evaluation and Discharge Note    Name: Sherin Ortiz  MRN: [id number]  Admitting Diagnosis:  Osteomyelitis of toe 4 Days Post-Op    Recommendations:     Discharge Recommendations: home with home health  Discharge Equipment Recommendations:  none  Barriers to discharge:  Decreased caregiver support    Assessment:     Sherin Ortiz is a 74 y.o. female with a medical diagnosis of Osteomyelitis of toe. At this time, patient is functioning at their prior level of function and does not require further acute OT services.     Plan:     During this hospitalization, patient does not require further acute OT services.  Please re-consult if situation changes.    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  Patient found supine with PureWick upon OT entry to room.    General Precautions: Standard, fall   Orthopedic Precautions:N/A   Braces: N/A     Occupational Performance:    Bed Mobility:    · Pt mod A for supine to sit edge of bed due to difficulty with mattress     Functional Mobility/Transfers:  · Functional Mobility: Pt resistant to transfer     Activities of Daily Living:  · Pt unable to demonstrate self care but insist she is able     Cognitive/Visual Perceptual:  Cognitive/Psychosocial Skills:     -       Oriented to: Person, Place, Time and Situation   -       Follows  Commands/attention:Follows two-step commands  -       Communication: clear/fluent  -       Memory: No Deficits noted  -       Safety awareness/insight to disability: intact   -       Mood/Affect/Coping skills/emotional control: Appropriate to situation    Physical Exam:  Upper Extremity Range of Motion:     -       Right Upper Extremity: WFL  -       Left Upper Extremity: WFL  Upper Extremity Strength:    -       Right Upper Extremity: WFL  -       Left Upper Extremity: WFL    AMPAC 6 Click ADL:  AMPAC Total Score: 19    Treatment & Education:  Evaluation complete.  ADL training and toielting dependnet bed level  Pt educated on safety, role of OT, importance of increased participation in self care for gains , expectations for participation, expectations for gains, POC, energy conservation, caregiver strain. White board updated.     Education:    Patient left supine with all lines intact    GOALS:   Multidisciplinary Problems     Occupational Therapy Goals        Problem: Occupational Therapy Goal    Goal Priority Disciplines Outcome Interventions   Occupational Therapy Goal     OT, PT/OT Ongoing (interventions implemented as appropriate)    Description:  Goals to be met by: 6/10    Patient will increase functional independence with ADLs by performing:    UE Dressing with [place].  LE Dressing with Set-up Assistance.  Grooming while seated with [place].  Toileting from bedside commode with Modified [place] for hygiene and clothing management.   Bathing from  edge of bed with Moderate Assistance.                      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    Benign essential hypertension 11/14/2012    Cataract     s/p phacoemulsification    Chronic kidney disease (CKD), stage III (moderate) 9/12/2013    Diabetic peripheral neuropathy associated with type 2 diabetes mellitus 11/14/2014    causing right hemiparesis    Gait disorder      Hyperlipidemia     Iritis - Both Eyes 6/10/2013    Kidney stone     Lymphedema     Morbid obesity with BMI of 40.0-44.9, adult 2/18/2015    Nephrolithiasis 4/20/2016    NS (nuclear sclerosis) 4/1/2013    Nuclear sclerosis - Both Eyes 4/29/2013    Preseptal cellulitis - Right Eye 4/29/2013    Proliferative diabetic retinopathy - Both Eyes 4/29/2013    Proliferative diabetic retinopathy, both eyes 4/1/2013    PSC (posterior subcapsular cataract) - Both Eyes 4/29/2013    S/P hernia repair 12/19/2012    TIA (transient ischemic attack) 11/18/2014    Tinea pedis 7/24/2012    Tinea pedis is present on both feet.     Type 2 diabetes mellitus with renal manifestations, controlled 12/12/2013    Type 2 diabetes, controlled, with moderate nonproliferative diabetic retinopathy without macular edema 9/17/2015    Ulcer of left lower extremity, limited to breakdown of skin 7/8/2015    Unspecified cerebral artery occlusion with cerebral infarction 11/16/2014    Unspecified venous (peripheral) insufficiency     Ureteral stone with hydronephrosis 1/27/2016    UTI (lower urinary tract infection)     Vaginal infection     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       Past Surgical History:   Procedure Laterality Date    APPENDECTOMY      CATARACT EXTRACTION W/  INTRAOCULAR LENS IMPLANT Left 5/21/2013    CATARACT EXTRACTION W/  INTRAOCULAR LENS IMPLANT Right 6/4/2013    CHOLECYSTECTOMY      COLONOSCOPY  12/22/2005    normal    CYSTOSCOPY  4/20/2016    [facility]formed [facility]    CYSTOSCOPY WITH STENT PLACEMENT Right 1/27/2016    [facility]    ESOPHAGOGASTRODUODENOSCOPY  12/21/2015    hiatal hernia, Schatzki ring    ESOPHAGOGASTRODUODENOSCOPY (EGD) N/A 6/30/2014    [facility] ([facility]ND F[facility])    EYE SURGERY Bilateral 2008    laser surgery both eyes    INCISION AND DRAINAGE, HAND - dorsal wrist - possible arthrotomy  - hand pan - hand table on stretcher - hand drapes - cysto tubing - cultures - 4x15in splint materials Left 5/10/2019    [facility] OR 1ST FLR    INSERTION, IOL PROSTHESIS Right 6/4/2013    [facility] 1ST FLR    INSERTION, IOL PROSTHESIS Left 5/21/2013    [facility] 1ST FLR    NASAL SEPTUM SURGERY      PHACOEMULSIFICATION, CATARACT Right 6/4/2013    [facility] 1ST FLR    PHACOEMULSIFICATION, CATARACT Left 5/21/2013    [facility] 1ST FLR    PYELOGRAM-RETROGRADE Right 4/20/2016    [facility]    REMOVAL-STENT-URETERAL Right 4/20/2016    [facility]    SUBTOTAL COLECTOMY  12/13/2012    transverse colon, for incarcerated umbilical hernia, Dr. Kat Bower    URETEROSCOPY Right 4/20/2016    Performed by [facility] at [facility] OR       Time Tracking:     OT Date of Treatment: 05/14/19  OT Start Time: 1110  OT Stop Time: 1140  OT Total Time (min): 30 min    Billable Minutes:Evaluation 15  Self Care/Home Management 15    Kristina Kapadia, OT  5/14/2019

## 2019-05-14 NOTE — PT/OT/SLP EVAL
Physical Therapy Evaluation     Patient Name: Sherin Ortiz  MRN: [id number]   Diagnosis: Osteomyelitis of toe    Recommendations:   Discharge Recommendations:  home health PT   Discharge Equipment Recommendations: none   Barriers to Discharge: none, has assistance at home    Assessment:   Sherin Ortiz is a 74 y.o. female admitted with a medical diagnosis of Osteomyelitis of toe.  Prior to admit she was modified independent at a w/c level (does not walk).  she now presents with the following impairments/functional limitations: weakness, impaired functional mobilty, impaired endurance, impaired fine motor, impaired balance, decreased upper extremity function, decreased lower extremity function, impaired self care skills, edema, impaired skin, abnormal tone.  She required increased assistance with transfers, but the room set up, chair set up, mattress surface and bed height were not her typical set up at home.  The patient reported that she felt confident that her mobility would be better at home.      Problem List:  weakness, impaired functional mobilty, impaired endurance, impaired fine motor, impaired balance, decreased upper extremity function, decreased lower extremity function, impaired self care skills, edema, impaired skin, abnormal tone  Rehab Prognosis:  fair.  The patient would benefit from acute skilled PT services to address these deficits and maximize their functional independence.    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    Benign essential hypertension 11/14/2012    Cataract     s/p phacoemulsification    Chronic kidney disease (CKD), stage III (moderate) 9/12/2013    Diabetic peripheral neuropathy associated with type 2 diabetes mellitus 11/14/2014    causing right hemiparesis    Gait disorder     Hyperlipidemia     Iritis - Both Eyes 6/10/2013    Kidney stone     Lymphedema     Morbid obesity with BMI of 40.0-44.9,  adult 2/18/2015    Nephrolithiasis 4/20/2016    NS (nuclear sclerosis) 4/1/2013    Nuclear sclerosis - Both Eyes 4/29/2013    Preseptal cellulitis - Right Eye 4/29/2013    Proliferative diabetic retinopathy - Both Eyes 4/29/2013    Proliferative diabetic retinopathy, both eyes 4/1/2013    PSC (posterior subcapsular cataract) - Both Eyes 4/29/2013    S/P hernia repair 12/19/2012    TIA (transient ischemic attack) 11/18/2014    Tinea pedis 7/24/2012    Tinea pedis is present on both feet.     Type 2 diabetes mellitus with renal manifestations, controlled 12/12/2013    Type 2 diabetes, controlled, with moderate nonproliferative diabetic retinopathy without macular edema 9/17/2015    Ulcer of left lower extremity, limited to breakdown of skin 7/8/2015    Unspecified cerebral artery occlusion with cerebral infarction 11/16/2014    Unspecified venous (peripheral) insufficiency     Ureteral stone with hydronephrosis 1/27/2016    UTI (lower urinary tract infection)     Vaginal infection     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       Past Surgical History:   Procedure Laterality Date    APPENDECTOMY      CATARACT EXTRACTION W/  INTRAOCULAR LENS IMPLANT Left 5/21/2013    CATARACT EXTRACTION W/  INTRAOCULAR LENS IMPLANT Right 6/4/2013    CHOLECYSTECTOMY      COLONOSCOPY  12/22/2005    normal    CYSTOSCOPY  4/20/2016    [facility] OR    CYSTOSCOPY WITH STENT PLACEMENT Right 1/27/2016    [facility] OR    ESOPHAGOGASTRODUODENOSCOPY  12/21/2015    hiatal hernia, Schatzki ring    ESOPHAGOGASTRODUODENOSCOPY (EGD) N/A 6/30/2014    [facility] (2ND FLR)    EYE SURGERY Bilateral 2008    laser surgery both eyes    INCISION AND DRAINAGE, HAND - dorsal wrist - possible arthrotomy - hand pan - hand table on stretcher - hand drapes - cysto tubing - cultures - 4x15in splint materials Left 5/10/2019     "[facility] 1ST FLR    INSERTION, IOL PROSTHESIS Right 6/4/2013    [facility] at [facility] 1ST FLR    INSERTION, IOL PROSTHESIS Left 5/21/2013    [facility] at [facility] 1ST FLR    NASAL SEPTUM SURGERY      PHACOEMULSIFICATION, CATARACT Right 6/4/2013    [facility] at [facility] 1ST FLR    PHACOEMULSIFICATION, CATARACT Left 5/21/2013    [facility] at [facility] 1ST FLR    PYELOGRAM-RETROGRADE Right 4/20/2016    [facility] OR    REMOVAL-STENT-URETERAL Right 4/20/2016    [facility] at [facility] OR    SUBTOTAL COLECTOMY  12/13/2012    transverse colon, for incarcerated umbilical hernia, Dr. Kat Trujillo-Katie    URETEROSCOPY Right 4/20/2016    Performed by [facility] at [facility] OR       Subjective   Patient comments/goals: "I can do this when I get home.  I always have more trouble in the hospital.  I don't want to try this."  Pain/Comfort:  ·  Pain Rating 1: (did not rate)     Recent Vital Signs: (Last documentation)  Pulse: (!) 57 (05/13/19 2000)  BP: (!) 156/67 (05/13/19 2000)  SpO2: 96 % (05/13/19 2000)     Living Environment:  Home: The patient lives in a 1 story home with her sister (bed bound) and grandson (caregiver)   PLOF:  Non ambulatory prior to admit, independent with bed mobility, independent with transfers to and from power chair (places chair in front of bed and turns around while holding onto the armrests of the chair.)  DME owned: (power wheelchair), manual w/c, BSC, RW    Assistance Available: Upon discharge, patient will have assistance from her grandson.    Objective:   The patient had PureWick    General Precautions: fall  Recent Surgery: Procedure(s) (LRB):  INCISION AND DRAINAGE, HAND - dorsal wrist - possible arthrotomy - hand pan - hand table on stretcher - hand drapes - cysto tubing - " cultures - 4x15in splint materials (Left)    Recent Vital Signs:   Pulse: (!) 57 (05/13/19 2000)  BP: (!) 156/67 (05/13/19 2000)  SpO2: 96 % (05/13/19 2000)    Physical Examination:   The patient was found supine in bed in NAD on low air loss mattress.     Cognitive Function:  - Follows Commands/attention: Follows multistep  commands  - Communication: clear/fluent  - Safety awareness/insight to disability: intact  Musculoskeletal System  Upper Extremities:   Strength: LUE weakness  Lower Extremities:  PROM: WFL  Strength: WFL  Integumentary System: multiple wounds  Posture and gross symmetry: fwd head, rounded shoulders, abdominal obesity    BALANCE:  Sitting: SBA at EOB (patient slipping forward d/t mattress vs patient height)  Standing: moderate assistance, unable to achieve erect standing, supports herself by leaning forward on her UEs    FUNCTIONAL MOBILITY ASSESSMENT:  Bed Mobility: performed with HOB flat  · Rolling/Turning R: min assistance   · Rolling/Turning L: SBA  · Supine > sit: min assistance  · Sit > supine: moderate assistance d/t bed height  · Scooting EOB: min assistance    Transfers:  · Sit <> stand transfer: moderate assistance x 4 trials   · Bed <> chair transfer:   · Chair set up facing patient to mimic home method: the patient attempted to stand with both hands on chair and turn 180 degrees to sit on surface; max assistance x 2 with patient unable to complete successfully and therapists returning patient to bed for safety  · After unsuccessful and unsafe attempt, the therapist educated patient on alternate transfer technique with chair at side and 90 degree turn  · Stand pivot transfer to chair on L; max assistance on 2nd trial with patient unable to take steps, therapist rotated the patient's hips to safely complete transfer    Gait: non ambulatory at baseline    Therapeutic Activities, Education, or Exercises:  Therapist educated the patient on the role of PT, POC, progress with mobility,  goals, discharge planning, and level of assistance with transfers and Importance of progressive mobilization, out of bed positioning, and participation in therapy.  PT discussed concerns about the patient's level of mobility. The patient verbalized that her mobility improves in her own environment and that she has assistance at home. The therapist discussed the patients current mobility status, deficits, and level of assistance with RN.  Time was also provided for active listening,  therapeutic counseling and discussion of health disposition. The therapist answered questions to patient/familys satisfaction within scope of practice.   White board updated to reflect current level of assistance.     FUNCTIONAL OUTCOME MEASURES:  [facility]  Turning over in bed (including adjusting bedclothes, sheets and blankets)?: 3  Sitting down on and standing up from a chair with arms (e.g., wheelchair, bedside commode, etc.): 2  Moving from lying on back to sitting on the side of the bed?: 3  Moving to and from a bed to a chair (including a wheelchair)?: 2  Need to walk in hospital room?: 1  Climbing 3-5 steps with a railing?: 1  Basic Mobility Total Score: 12    Goals:     Multidisciplinary Problems     Physical Therapy Goals        Problem: Physical Therapy Goal    Goal Priority Disciplines Outcome Goal Variances Interventions   Physical Therapy Goal     PT, PT/OT Ongoing (interventions implemented as appropriate)     Description:    Goals to be met by 5/24/2019    1. Pt will perform rolling to the R and L with independence.   2. Pt will perform supine to sit from both sides of the bed with independence.  3. Pt will perform sit to supine with independence.  4. Pt will perform sit to stand transfers with mod I holding bed rail    5. Pt will perform bed <> chair transfers with SBA and UE support on chair.                      Plan:   During this hospitalization, patient will be seen 4 x/week for therapeutic activities, therapeutic  exercises, neuromuscular re-education, wheelchair management/training to address impairments and functional mobility deficits.   · Plan of Care Expires: 06/13/19   Plan of Care Reviewed with: patient    This plan of care has been discussed with the patient and/or family who were involved in its development and are in agreement with the identified goals and treatment plan.     Clinical Decision Making:   COMPLEXITY OF PT EXAMINATION:  HISTORY  - Comorbidities that affect the PT plan of care or the patient's ability to participate in/progress with therapy (see above)  - Personal Factors: Time since onset of injury, illness, diagnosis, or exacerbation, Multiple medical problems, Patient's age, Prior level of function, Home environment and family support and Level of motivation   EXAMINATION  - Body Systems: Neuromuscular System: a general assessment of gross coordinated movement (ie. coordination, gait, balance, transitions, transfers), motor control, motor learning, or visual-perceptual skills , Musculoskeletal System: the assessment of gross symmetry/posture, ROM, strength, spasticity, height, or weight and Integumentary System: the assessment of skin integrity, color, temperature, pliability (texture), or presence of scar formation    - Activity or participation limitations: Status of current condition , LE wounds in weight bearing surfaces  CLINICAL PRESENTATION: Evolving/changing characteristics  - Continuous, intermittent or changing levels of pain, safety concerns during transfers  LEVEL OF COMPLEXITY: Moderate Complexity: (1 or more apply) the patient has at least 1-2 personal factors or comorbidities that impact the plan of care; the examination addressed at least 3 body structures, functions, activity limitations, and/or participation restrictions; and the clinical presentation had evolving or changing characteristics    Time Tracking:   PT Received On:  05/14/19  PT Start Time:   1120    PT Stop Time:   1147  PT Total Time (min): 27 min      Billable Minutes: Evaluation 17 and Therapeutic Activity 10    Suyapa John, PT  10/02/2018  [phone] (pager)

## 2019-05-14 NOTE — PLAN OF CARE
Problem: Adult Inpatient Plan of Care  Goal: Optimal Comfort and Wellbeing  Outcome: Ongoing (interventions implemented as appropriate)  Plan of care implemented. Blood glucose monitored. Antibiotic therapy administered.   Patient encouraged to call for assistance with needs to maintain safety.  Understanding verbalized.

## 2019-05-14 NOTE — CONSULTS
Placed double lumen PICC to right basilic vein using u/s guidance.  41 cm in length, 40 cm arm circumference and 0 cm exposed.   Lot # [id number].

## 2019-05-14 NOTE — PLAN OF CARE
Problem: Adult Inpatient Plan of Care  Goal: Plan of Care Review  Outcome: Ongoing (interventions implemented as appropriate)  Swelling to left anterior forearm and wrist decreased, non-tender, no redness. Wounds cleansed, wrapped in kerlex. Patient displays ability to roll and turn self with 1 person assistance. Vitals stable. Will continue to monitor

## 2019-05-14 NOTE — SUBJECTIVE & OBJECTIVE
Interval Hx: Pt seen at bedside, no pedal complaints at this time.    Scheduled Meds:   aspirin  81 mg Oral Daily    atorvastatin  40 mg Oral QHS    ceFAZolin (ANCEF) IVPB  2 g Intravenous Q8H    colchicine  0.6 mg Oral BID    insulin aspart U-100  4 Units Subcutaneous TIDWM    insulin detemir U-100  18 Units Subcutaneous QHS    miconazole nitrate 2%   Topical (Top) BID    sodium chloride 0.9%  10 mL Intravenous Q6H     Continuous Infusions:  PRN Meds:acetaminophen, dextrose 50%, glucagon (human recombinant), hydrALAZINE, HYDROcodone-acetaminophen, HYDROcodone-acetaminophen, insulin aspart U-100, ondansetron, polyethylene glycol, promethazine, ramelteon, sodium chloride 0.9%, Fl[place] PICC Protocol **AND** sodium chloride 0.9% **AND** sodium chloride 0.9%    Review of patient's allergies indicates:   Allergen Reactions    Penicillins Hives     Other reaction(s): Hives  Patient has received cefdinir, ceftriaxone, cefazolin and cefepime in the past with no documented reactions    Sulfa (sulfonamide antibiotics) Other (See Comments)     Shajosie, pt states her doctor told her the shakes were possibly caused by an allergy to sulfa        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    Benign essential hypertension 11/14/2012    Cataract     s/p phacoemulsification    Chronic kidney disease (CKD), stage III (moderate) 9/12/2013    Diabetic peripheral neuropathy associated with type 2 diabetes mellitus 11/14/2014    causing right hemiparesis    Gait disorder     Hyperlipidemia     Iritis - Both Eyes 6/10/2013    Kidney stone     Lymphedema     Morbid obesity with BMI of 40.0-44.9, adult 2/18/2015    Nephrolithiasis 4/20/2016    NS (nuclear sclerosis) 4/1/2013    Nuclear sclerosis - Both Eyes 4/29/2013    Preseptal cellulitis - Right Eye 4/29/2013    Proliferative diabetic retinopathy - Both Eyes 4/29/2013    Proliferative diabetic retinopathy, both  eyes 4/1/2013    PSC (posterior subcapsular cataract) - Both Eyes 4/29/2013    S/P hernia repair 12/19/2012    TIA (transient ischemic attack) 11/18/2014    Tinea pedis 7/24/2012    Tinea pedis is present on both feet.     Type 2 diabetes mellitus with renal manifestations, controlled 12/12/2013    Type 2 diabetes, controlled, with moderate nonproliferative diabetic retinopathy without macular edema 9/17/2015    Ulcer of left lower extremity, limited to breakdown of skin 7/8/2015    Unspecified cerebral artery occlusion with cerebral infarction 11/16/2014    Unspecified venous (peripheral) insufficiency     Ureteral stone with hydronephrosis 1/27/2016    UTI (lower urinary tract infection)     Vaginal infection     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     Past Surgical History:   Procedure Laterality Date    APPENDECTOMY      CATARACT EXTRACTION W/  INTRAOCULAR LENS IMPLANT Left 5/21/2013    CATARACT EXTRACTION W/  INTRAOCULAR LENS IMPLANT Right 6/4/2013    CHOLECYSTECTOMY      COLONOSCOPY  12/22/2005    normal    CYSTOSCOPY  4/20/2016    [facility]    CYSTOSCOPY WITH STENT PLACEMENT Right 1/27/2016    [facility]    ESOPHAGOGASTRODUODENOSCOPY  12/21/2015    hiatal hernia, Schatzki ring    ESOPHAGOGASTRODUODENOSCOPY (EGD) N/A 6/30/2014    Performed by [facility]aspreet Ng [facility] at [facility] END[facility] (2ND FLR)    EYE SURGERY Bilateral 2008    laser surgery both eyes    INCISION AND DRAINAGE, HAND - dorsal wrist - possible arthrotomy - hand pan - hand table on stretcher - hand drapes - cysto tubing - cultures - 4x15in splint materials Left 5/10/2019    Performed by Fátima Jarquin MD at [facility] OR 1ST FLR    INSERTION, IOL PROSTHESIS Right 6/4/2013    Performed by Federico Schwartz [facility] at [facility] OR 1ST FLR    INSERTION, IOL PROSTHESIS Left 5/21/2013    [facility] at [facility] 1ST FLR    NASAL SEPTUM  SURGERY      PHACOEMULSIFICATION, CATARACT Right 2013    [facility]    PHACOEMULSIFICATION, CATARACT Left 2013    [facility]ST FLR    PYELOGRAM-RETROGRADE Right 2016    [facility]    REMOVAL-STENT-URETERAL Right 2016    [facility] OR    SUBTOTAL COLECTOMY  2012    transverse colon, for incarcerated umbilical hernia, Dr. Kat Trujillo-Katie    URETEROSCOPY Right 2016    [facility] by [facility] at [facility] OR       Family History     Problem Relation (Age of Onset)    Cataracts Sister, Brother    Diabetes Sister    Heart disease Brother    Leukemia Mother        Tobacco Use    Smoking status: Former Smoker     Packs/day: 0.50     Years: 15.00     Pack years: 7.50     Types: Cigarettes     Last attempt to quit: 1982     Years since quittin.8    Smokeless tobacco: Former User    Tobacco comment: smoked one pack per week   Substance and Sexual Activity    Alcohol use: No    Drug use: No    Sexual activity: Not Currently     Review of Systems   Constitutional: Negative for chills and fever.   Cardiovascular: Negative for leg swelling.   Gastrointestinal: Negative for nausea and vomiting.   Musculoskeletal: Negative for arthralgias and joint swelling.   Skin: Positive for wound. Negative for rash.   Psychiatric/Behavioral: Negative for agitation and confusion.     Objective:     Vital Signs (Most Recent):  Temp: 97 °F (36.1 °C) (19)  Pulse: (!) 57 (19)  Resp: 18 (19)  BP: (!) 156/67 (19)  SpO2: 96 % (19) Vital Signs (24h Range):  Temp:  [97 °F (36.1 °C)] 97 °F (36.1 °C)  Pulse:  [57] 57  Resp:  [18] 18  SpO2:  [96 %] 96 %  BP: (156)/(67) 156/67     Weight: 127 kg (279 lb 15.7 oz)  Body mass index is 43.85 kg/m².    Foot Exam    General  Orientation: alert and  oriented to person, place, and time   Affect: appropriate       Right Foot/Ankle     Inspection and Palpation  Ecchymosis: none  Tenderness: none   Swelling: none     Neurovascular  Dorsalis pedis: 1+  Posterior tibial: 1+  Saphenous nerve sensation: diminished  Tibial nerve sensation: diminished  Superficial peroneal nerve sensation: diminished  Deep peroneal nerve sensation: diminished  Sural nerve sensation: diminished      Left Foot/Ankle      Inspection and Palpation  Ecchymosis: none  Tenderness: none   Swelling: none     Neurovascular  Dorsalis pedis: 1+  Posterior tibial: 1+  Saphenous nerve sensation: diminished  Tibial nerve sensation: diminished  Superficial peroneal nerve sensation: diminished  Deep peroneal nerve sensation: diminished  Sural nerve sensation: diminished            Laboratory:  A1C:   Recent Labs   Lab 03/22/19  1744   HGBA1C 9.8*     CBC:   Recent Labs   Lab 05/14/19 0412   WBC 4.68   RBC 3.50*   HGB 10.0*   HCT 31.5*      MCV 90   MCH 28.6   MCHC 31.7*     CMP:   Recent Labs   Lab 05/14/19 0412   GLU 80   CALCIUM 8.6*      K 4.3   CO2 22*      BUN 32*   CREATININE 1.1     CRP:   Recent Labs   Lab 05/09/19  1322   CRP 98.4*     ESR:   Recent Labs   Lab 05/09/19  1322   SEDRATE 104*     Prealbumin: No results for input(s): PREALBUMIN in the last 48 hours.  Wound Cultures:   Recent Labs   Lab 03/22/19  2200 03/23/19  1119 05/09/19 2003 05/09/19  2008 05/10/19  1503   LABAERO KLEBSIELLA OXYTOCA  Moderate    MORGANELLA MORGANII  Moderate    METHICILLIN RESISTANT STAPHYLOCOCCUS AUREUS  Many    PROTEUS MIRABILIS  Few    PSEUDOMONAS AERUGINOSA  Many    PROVIDENCIA STUARTII  Many  Skin lyndsey also present    PSEUDOMONAS AERUGINOSA  Many    KLEBSIELLA OXYTOCA  Moderate   ENTEROBACTER AEROGENES  Few    METHICILLIN RESISTANT STAPHYLOCOCCUS AUREUS  Many    ENTEROBACTER CLOACAE  Few  Skin lyndsey also present   No growth No growth STREPTOCOCCUS GROUP  C  Many  Beta-hemolytic streptococci are routinely susceptible to   penicillins,cephalosporins and carbapenems.  Susceptibility testing not routinely performed    PROTEUS MIRABILIS  Few  Skin lyndsey also present       Microbiology Results (last 7 days)     Procedure Component Value Units Date/Time    Culture, Anaerobe [[id number]] Collected:  05/10/19 1503    Order Status:  Completed Specimen:  Wound from Toe, Right Foot Updated:  05/14/19 1339     Anaerobic Culture --     FINEGOLDIA MAGNA  Moderate      Fungus culture [[id number]] Collected:  05/09/19 2003    Order Status:  Completed Specimen:  Joint Fluid from Wrist, Left Updated:  05/14/19 0954     Fungus (Mycology) Culture Culture in progress    Fungus culture [[id number]] Collected:  05/09/19 2008    Order Status:  Completed Specimen:  Joint Fluid from Hand, Left Updated:  05/14/19 0954     Fungus (Mycology) Culture Culture in progress    Culture, Anaerobic [[id number]] Collected:  05/09/19 2008    Order Status:  Completed Specimen:  Joint Fluid from Hand, Left Updated:  05/14/19 0932     Anaerobic Culture Culture in progress    Culture, Anaerobic [[id number]] Collected:  05/09/19 2003    Order Status:  Completed Specimen:  Joint Fluid from Wrist, Left Updated:  05/14/19 0931     Anaerobic Culture Culture in progress    Aerobic culture [[id number]]  (Susceptibility) Collected:  05/10/19 1503    Order Status:  Completed Specimen:  Wound from Toe, Right Foot Updated:  05/13/19 1112     Aerobic Bacterial Culture --     STREPTOCOCCUS GROUP C  Many  Beta-hemolytic streptococci are routinely susceptible to   penicillins,cephalosporins and carbapenems.  Susceptibility testing not routinely performed       Aerobic Bacterial Culture --     PROTEUS MIRABILIS  Few  Skin lyndsey also present      Aerobic culture [[id number]] Collected:  05/09/19 2003    Order Status:  Completed Specimen:  Joint Fluid from Wrist, Left Updated:  05/13/19 0804     Aerobic Bacterial Culture No growth     Aerobic culture [[id number]] Collected:  05/09/19 2008    Order Status:  Completed Specimen:  Joint Fluid from Hand, Left Updated:  05/13/19 0804     Aerobic Bacterial Culture No growth    Urine culture [[id number]] Collected:  05/10/19 1630    Order Status:  Completed Specimen:  Urine Updated:  05/12/19 0656     Urine Culture, Routine No significant growth    Narrative:       yellow and gray tube,one orange top cup  Preferred Collection Type->Urine, Clean Catch    AFB Culture & Smear [[id number]] Collected:  05/09/19 2003    Order Status:  Completed Specimen:  Joint Fluid from Wrist, Left Updated:  05/10/19 2127     AFB Culture & Smear Culture in progress     AFB CULTURE STAIN No acid fast bacilli seen.    AFB Culture & Smear [[id number]] Collected:  05/09/19 2008    Order Status:  Completed Specimen:  Joint Fluid from Hand, Left Updated:  05/10/19 2127     AFB Culture & Smear Culture in progress     AFB CULTURE STAIN No acid fast bacilli seen.    Gram stain [[id number]] Collected:  05/09/19 2008    Order Status:  Completed Specimen:  Joint Fluid from Hand, Left Updated:  05/09/19 2111     Gram Stain Result Rare WBC's      No organisms seen    Gram stain [[id number]] Collected:  05/09/19 2003    Order Status:  Completed Specimen:  Joint Fluid from Wrist, Left Updated:  05/09/19 2109     Gram Stain Result Rare WBC's      No organisms seen          Diagnostic Results:  X-ray: Erosive changes of the distal phalanx of the great toe with associated toe wound which are concerning for osteomyelitis.  MRI could be performed for further evaluation.    MRI: ordered    DISHA: 3/24  Ankle-brachial indices unable to be obtained secondary to the positioning of bilateral lower extremity bandages.    Diffuse bilateral atherosclerosis with no hemodynamically significant stenosis demonstrated in the right or left lower extremity arterial system.    Clinical Findings:  Ulcer Location: right distal  hallux  Measurements:1.0x1.0x0.2  Periwound: hyperkeratotic  Drainage: scant serosanguinous  Base: granular  Signs of infection: none    Multiple LE venous stasis ulcers that are fibrogranular, no signs of infection.    5/14 5/11/19:            5/10/19:

## 2019-05-14 NOTE — PROCEDURES
"Sherin Ortiz is a 74 y.o. female patient.    Temp: 97 °F (36.1 °C) (05/13/19 2000)  Pulse: (!) 57 (05/13/19 2000)  Resp: 18 (05/13/19 2000)  BP: (!) 156/67 (05/13/19 2000)  SpO2: 96 % (05/13/19 2000)  Weight: 127 kg (279 lb 15.7 oz) (05/10/19 0220)  Height: 5' 7" (170.2 cm) (05/10/19 0220)    PICC  Date/Time: 5/14/2019 10:15 AM  Performed by: Birgit Swann RN  Assisting provider: Eloise Holt RN  Consent Done: Yes  Time out: Immediately prior to procedure a time out was called to verify the correct patient, procedure, equipment, support staff and site/side marked as required  Indications: med administration and vascular access  Anesthesia: local infiltration  Local anesthetic: lidocaine 1% without epinephrine  Anesthetic Total (mL): 2  Preparation: skin prepped with ChloraPrep  Skin prep agent dried: skin prep agent completely dried prior to procedure  Sterile barriers: all five maximum sterile barriers used - cap, mask, sterile gown, sterile gloves, and large sterile sheet  Hand hygiene: hand hygiene performed prior to central venous catheter insertion  Location details: right basilic  Catheter type: double lumen  Catheter size: 5 Fr  Catheter Length: 41cm    Ultrasound guidance: yes  Vessel Caliber: medium and patent, compressibility normal  Vascular Doppler: not done  Needle advanced into vessel with real time Ultrasound guidance.  Guidewire confirmed in vessel.  Image recorded and saved.  Sterile sheath used.  Number of attempts: 1  Post-procedure: blood return through all ports, chlorhexidine patch and sterile dressing applied  Technical procedures used: 3CG  Specimens: No  Implants: No  Assessment: placement verified by x-ray  Complications: none          Eloise Holt  5/14/2019  "

## 2019-05-14 NOTE — PLAN OF CARE
Problem: Occupational Therapy Goal  Goal: Occupational Therapy Goal  Goals to be met by: 6/10    Patient will increase functional independence with ADLs by performing:    UE Dressing with [place].  LE Dressing with Set-up Assistance.  Grooming while seated with [place].  Toileting from bedside commode with Modified [place] for hygiene and clothing management.   Bathing from  edge of bed with Moderate Assistance.    Outcome: Ongoing (interventions implemented as appropriate)  Goals addressed and unmet.  Cont with POC  Kristina Kapadia, OT  5/14/2019

## 2019-05-14 NOTE — TELEPHONE ENCOUNTER
Spoke with Aubrey from [facility] and gave Dr. Sterling's verbal to change the pt to continuous

## 2019-05-14 NOTE — ASSESSMENT & PLAN NOTE
- patient recently admitted to hospital 03/22/2019 for cellulitis of L heel ulcer, completed tx with Vancomycin on 04/12/2019  - followed by wound care and  wound care  - will consult wound care; assistance appreciated  - wound care concern for pseudomonas-levoquin initiated changed to cefepime

## 2019-05-14 NOTE — PLAN OF CARE
05/14/19 1107   Post-Acute Status   Post-Acute Authorization Home Health/Hospice   Home Health/Hospice Status Referrals Sent   SW sent HH orders to [facility], PT was accepted for [facility]. SW will continue to follow.

## 2019-05-14 NOTE — PLAN OF CARE
"[facility]    HOME HEALTH ORDERS  FACE TO FACE ENCOUNTER    Patient Name: Sherin Ortiz  YOB: 1945    PCP: Ame Vasquez MD   PCP Address: [address] / [place] [address]  PCP Phone Number: [phone]  PCP Fax: [phone]    Encounter Date: 05/14/2019    Admit to Home Health    Diagnoses:  Active Hospital Problems    Diagnosis  POA    *Osteomyelitis of toe [M86.9]  Yes    Osteomyelitis [M86.9]  Yes    Venous stasis ulcers of both lower extremities [I83.019, I83.029, L97.919, L97.929]  Yes    Dermatitis associated with moisture [L30.8]  Yes    Toe ulcer [L97.509]  Yes    Cellulitis of left hand [L03.114]  Yes    PAOD (peripheral arterial occlusive disease) [I77.9]  Yes     Location in Record and Date:   VAS DISHA-9/18/2015    "Rt DISHA (1.12) Segment/Brachial Index and PVR wavef  orms indicate minimal peripheral arterial obstructive disease.  Lt DISHA (1.09) Segment/Brachial Index and PVR waveforms indicate minimal peripheral arterial obstructive disease."  Other Chronic Conditions:  HLD, DM    Medications:  Aspirin 81 mg, Lipi  tor 40 mg      Uncontrolled type 2 diabetes mellitus with stage 3 chronic kidney disease, with long-term current use of insulin [E11.22, E11.65, N18.3, Z79.4]  Not Applicable     Chronic    Anemia of chronic disease [D63.8]  Yes    Hyperlipidemia LDL goal <100 [E78.5]  Yes     Chronic    Ulcer of heel and midfoot [L97.409]  Yes     This patient has had a recurrent ulcer to the left heel since 8/5/2011.  She currently has a football dressing in place and the wound has responded to this treatment.        Resolved Hospital Problems   No resolved problems to display.       Future Appointments   Date Time Provider Department Center   5/22/2019 11:00 AM Akosua Alvarenga NP [facility] WOUND [facility]   5/29/2019 10:00 AM Ame Vasquez MD [facility] IM [facility] PCW   6/13/2019  2:00 PM Ernesto Sterling MD [facility]           I have seen and " examined this patient face to face today. My clinical findings that support the need for the home health skilled services and home bound status are the following:  Weakness/numbness causing balance and gait disturbance due to Weakness/Debility making it taxing to leave home.    Allergies:  Review of patient's allergies indicates:   Allergen Reactions    Penicillins Hives     Other reaction(s): Hives  Patient has received cefdinir, ceftriaxone, cefazolin and cefepime in the past with no documented reactions    Sulfa (sulfonamide antibiotics) Other (See Comments)     Eyad, pt states her doctor told her the shakes were possibly caused by an allergy to sulfa       Diet: diabetic diet: 2000 calorie    Activities: activity as tolerated    Nursing:   SN to complete comprehensive assessment including routine vital signs. Instruct on disease process and s/s of complications to report to MD. Review/verify medication list sent home with the patient at time of discharge  and instruct patient/caregiver as needed. Frequency may be adjusted depending on start of care date.    Notify MD if SBP > 160 or < 90; DBP > 90 or < 50; HR > 120 or < 50; Temp > 101; Other:         CONSULTS:    Physical Therapy to evaluate and treat. Evaluate for home safety and equipment needs; Establish/upgrade home exercise program. Perform / instruct on therapeutic exercises, gait training, transfer training, and Range of Motion.  Occupational Therapy to evaluate and treat. Evaluate home environment for safety and equipment needs. Perform/Instruct on transfers, ADL training, ROM, and therapeutic exercises.   to evaluate for community resources/long-range planning.  Aide to provide assistance with personal care, ADLs, and vital signs.    MISCELLANEOUS CARE:  Home Infusion Therapy:   SN to perform Infusion Therapy/Central Line Care.  Review Central Line Care & Central Line Flush with patient.    Administer (drug and dose): Cefazolin 2g  q8h  Last dose given: 1245                        Home dose due: 2045    Scrub the Hub: Prior to accessing the line, always perform a 30 second alcohol scrub  Each lumen of the central line is to be flushed at least daily with 10 mL Normal Saline and 3 mL Heparin flush (10 units/mL)  Skilled Nurse (SN) may draw blood from IV access  - Please collect CBC, CMP, CRP once a week and fax to [facility] at [phone]  Blood Draw Procedure:   - Aspirate at least 5 mL of blood   - Discard   - Obtain specimen   - Change injection cap   - Flush with 20 mL Normal Saline followed by a                 3-5 mL Heparin flush (10 units/mL)  Central :   - Sterile dressing changes are done weekly and as needed.   - Use chlor-hexadine scrub to cleanse site, apply Biopatch to insertion site,       apply securement device dressing   - Injection caps are changed weekly and after EVERY lab draw.   - If sterile gauze is under dressing to control oozing,                 dressing change must be performed every 24 hours until gauze is not needed.    WOUND CARE ORDERS  yes:  Foot Ulcer:  Location: right toe  Cleanse wounds with 1/4 dakins solution, cover with hydrofera blue, abd and kerlex    05/10/19 1011        Wound 03/26/19 0810 Venous Ulcer anterior Leg   Date First Assessed/Time First Assessed: 03/26/19 0810   Pre-existing: Yes  Primary Wound Type: Venous Ulcer  Side: Left  Orientation: anterior  Location: Leg   Wound Image    Wound WDL ex   Dressing Appearance Moist drainage   Drainage Amount Moderate   Drainage Characteristics/Odor Purulent;Malodorous   Appearance Fibrin;Slough   Tissue loss description Full thickness   Yellow (%), Wound Tissue Color 100 %   Periwound Area Hemosiderin Staining   Wound Edges Open   Wound Length (cm) 4.5 cm   Wound Width (cm) 3 cm   Wound Depth (cm) 0.1 cm   Wound Volume (cm^3) 1.35 cm^3   Wound Surface Area (cm^2) 13.5 cm^2   Care Cleansed with:;Wound cleanser   Dressing  Removed;Applied;Changed;Methylene blue/gentian violet   Dressing Change Due 05/12/19        Wound 05/10/19 0800 Venous Ulcer posterior Calf   Date First Assessed/Time First Assessed: 05/10/19 0800   Pre-existing: Yes  Primary Wound Type: Venous Ulcer  Side: Left  Orientation: posterior  Location: Calf   Wound Image    Wound WDL ex   Dressing Appearance Saturated   Drainage Amount Large   Drainage Characteristics/Odor Purulent;Malodorous   Appearance Fibrin;Slough   Tissue loss description Full thickness   Yellow (%), Wound Tissue Color 100 %   Periwound Area Redness;Hemosiderin Staining   Wound Edges Open   Wound Length (cm) 6 cm   Wound Width (cm) 5 cm   Wound Depth (cm) 0.1 cm   Wound Volume (cm^3) 3 cm^3   Wound Surface Area (cm^2) 30 cm^2   Care Cleansed with:   Dressing Removed;Applied;Changed;Methylene blue/gentian violet;Abd pad;Rolled gauze   Dressing Change Due 05/12/19        Wound 05/10/19 0800 Venous Ulcer lower Leg   Date First Assessed/Time First Assessed: 05/10/19 0800   Pre-existing: Yes  Primary Wound Type: Venous Ulcer  Side: Right  Orientation: lower  Location: Leg   Wound Image    Wound WDL ex   Dressing Appearance Saturated   Drainage Amount Moderate   Drainage Characteristics/Odor Creamy;Malodorous   Appearance Granulating;Slough   Tissue loss description Full thickness   Red (%), Wound Tissue Color 50 %   Yellow (%), Wound Tissue Color 50 %   Periwound Area Redness;Hemosiderin Staining   Wound Edges Open   Wound Length (cm) 9 cm   Wound Width (cm) 3 cm   Wound Depth (cm) 0.1 cm   Wound Volume (cm^3) 2.7 cm^3   Wound Surface Area (cm^2) 27 cm^2   Care Sterile normal saline;Cleansed with:   Dressing Removed;Applied;Changed;Methylene blue/gentian violet;Abd pad;Rolled gauze   Dressing Change Due 05/12/19        Wound 05/10/19 0800 Diabetic Ulcer Toe, first   Date First Assessed/Time First Assessed: 05/10/19 0800   Pre-existing: Yes  Primary Wound Type: Diabetic Ulcer  Side: Right  Location: Toe,  first   Wound Image    Wound WDL ex   Dressing Appearance Moist drainage   Drainage Amount Moderate   Drainage Characteristics/Odor Purulent   Appearance Yellow   Tissue loss description Full thickness   Yellow (%), Wound Tissue Color 100 %   Periwound Area Redness   Wound Edges Open   Wound Length (cm) 1.5 cm   Wound Width (cm) 1.6 cm   Wound Depth (cm) 0.1 cm   Wound Volume (cm^3) 0.24 cm^3   Wound Surface Area (cm^2) 2.4 cm^2   Care Cleansed with:;Sterile normal saline   Dressing Removed;Applied;Changed;Methylene blue/gentian violet;Rolled gauze   Dressing Change Due 05/12/19        Pressure Injury 05/10/19 0800 Right Heel Unstageable   Date First Assessed/Time First Assessed: 05/10/19 0800   Pressure Injury Present on Admission: yes  Side: Right  Location: Heel  Staging: Unstageable   Wound Image    Staging Unstageable   Dressing Appearance Moist drainage;Saturated   Drainage Amount Moderate   Drainage Characteristics/Odor Creamy;Malodorous   Appearance Granulating;Necrotic   Tissue loss description Full thickness   Black (%), Wound Tissue Color 10 %   Red (%), Wound Tissue Color 50 %   Yellow (%), Wound Tissue Color 40 %   Periwound Area Macerated   Wound Edges Rolled/closed   Wound Length (cm) 3.4 cm   Wound Width (cm) 5 cm   Wound Depth (cm) 0.5 cm   Wound Volume (cm^3) 8.5 cm^3   Wound Surface Area (cm^2) 17 cm^2   Care Cleansed with:;Sterile normal saline   Dressing Removed;Applied;Changed;Methylene blue/gentian violet;Abd pad;Rolled gauze   Dressing Change Due 05/12/19        Pressure Injury 05/10/19 0800 Left dorsal Foot Unstageable   Date First Assessed/Time First Assessed: 05/10/19 0800   Pressure Injury Present on Admission: yes  Side: Left  Orientation: dorsal  Location: Foot  Staging: Unstageable   Wound Image    Staging Unstageable   Dressing Appearance Saturated   Drainage Amount Moderate   Drainage Characteristics/Odor Serosanguineous   Appearance Slough;Granulating   Tissue loss description Full  "thickness   Red (%), Wound Tissue Color 50 %   Yellow (%), Wound Tissue Color 50 %   Periwound Area [place]   Wound Edges Open   Wound Length (cm) 1 cm   Wound Width (cm) 1 cm   Wound Depth (cm) 0.1 cm   Wound Volume (cm^3) 0.1 cm^3   Wound Surface Area (cm^2) 1 cm^2   Care Cleansed with:;Sterile normal saline   Dressing Removed;Applied;Changed;Methylene blue/gentian violet;Abd pad;Rolled gauze   Dressing Change Due 05/12/19        Pressure Injury 05/10/19 0800 Left Heel Unstageable   Date First Assessed/Time First Assessed: 05/10/19 0800   Pressure Injury Present on Admission: yes  Side: Left  Location: Heel  Staging: Unstageable   Wound Image    Staging Unstageable   Drainage Amount None   Black (%), Wound Tissue Color 100 %  (scab)   Wound Length (cm) 1 cm   Wound Width (cm) 1 cm   Wound Depth (cm) 0.1 cm   Wound Volume (cm^3) 0.1 cm^3   Wound Surface Area (cm^2) 1 cm^2                      Medications: Review discharge medications with patient and family and provide education.      Current Discharge Medication List      START taking these medications    Details   ceFAZolin (ANCEF) 1 gram injection Inject 2,000 mg (2 g total) into the vein every 8 (eight) hours.  Qty: [id number] mg, Refills: 1      colchicine (COLCRYS) 0.6 mg tablet Take 1 tablet (0.6 mg total) by mouth 2 (two) times daily. Until gout pain is resolved  Qty: 60 tablet, Refills: 3      miconazole nitrate 2% (MICOTIN) 2 % Oint Apply topically 2 (two) times daily.  Refills: 0         CONTINUE these medications which have NOT CHANGED    Details   ACCU-CHEK RAMIRO PLUS TEST STRP Strp TEST TWICE DAILY  Qty: 200 strip, Refills: 3      ACCU-CHEK SOFTCLIX LANCETS Misc Test twice daily      aspirin 81 MG Chew Take 81 mg by mouth once daily.        atorvastatin (LIPITOR) 40 MG tablet TAKE 1 TABLET EVERY EVENING  Qty: 90 tablet, Refills: 3      BD INSULIN SYRINGE ULTRA-FINE 1/2 mL 30 gauge x 1/2" Syrg USE EVERY NIGHT  Qty: 90 each, Refills: 3      clopidogrel " (PLAVIX) 75 mg tablet TAKE 1 TABLET EVERY DAY  Qty: 90 tablet, Refills: 3      LANTUS U-100 INSULIN 100 unit/mL injection INJECT 7 TO 10 UNITS SUBCUTANEOUSLY IN THE EVENING DEPENDING ON SCALE (DISCARD EACH VIAL AFTER 28 DAYS)  Qty: 30 mL, Refills: 3         STOP taking these medications       bumetanide (BUMEX) 1 MG tablet Comments:   Reason for Stopping:         diclofenac sodium 1 % Gel Comments:   Reason for Stopping:         vancomycin HCl in 5 % dextrose (VANCOMYCIN IN 5 % DEXTROSE) 1.25 gram/250 mL Soln Comments:   Reason for Stopping:               I certify that this patient is confined to her home and needs intermittent skilled nursing care, physical therapy and occupational therapy.

## 2019-05-14 NOTE — PLAN OF CARE
Problem: Physical Therapy Goal  Goal: Physical Therapy Goal  Outcome: Ongoing (interventions implemented as appropriate)  Initial eval completed.  Patient was non ambulatory prior to admit. Results, POC, and therapy recommendations discussed with patient.   Complete evaluation documentation to follow.    Mobility Recommendations: max assistance for transfer  D/c recommendations: HH therapy     Suyapa John, PT  5/14/2019  [phone] (pager)

## 2019-05-14 NOTE — PROGRESS NOTES
[facility]  Podiatry  Progress Note    Patient Name: Sherin Ortiz  MRN: [id number]  Admission Date: 5/9/2019  Hospital Length of Stay: 3 days  Attending Physician: Mae Dhillon MD  Primary Care Provider: Ame Vasquez MD   Interval Hx: Pt seen at bedside, no pedal complaints at this time.    Scheduled Meds:   aspirin  81 mg Oral Daily    atorvastatin  40 mg Oral QHS    ceFAZolin (ANCEF) IVPB  2 g Intravenous Q8H    colchicine  0.6 mg Oral BID    insulin aspart U-100  4 Units Subcutaneous TIDWM    insulin detemir U-100  18 Units Subcutaneous QHS    miconazole nitrate 2%   Topical (Top) BID    sodium chloride 0.9%  10 mL Intravenous Q6H     Continuous Infusions:  PRN Meds:acetaminophen, dextrose 50%, glucagon (human recombinant), hydrALAZINE, HYDROcodone-acetaminophen, HYDROcodone-acetaminophen, insulin aspart U-100, ondansetron, polyethylene glycol, promethazine, ramelteon, sodium chloride 0.9%, Flushing PICC Protocol **AND** sodium chloride 0.9% **AND** sodium chloride 0.9%    Review of patient's allergies indicates:   Allergen Reactions    Penicillins Hives     Other reaction(s): Hives  Patient has received cefdinir, ceftriaxone, cefazolin and cefepime in the past with no documented reactions    Sulfa (sulfonamide antibiotics) Other (See Comments)     Shakes, pt states her doctor told her the shakes were possibly caused by an allergy to sulfa        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    Benign essential hypertension 11/14/2012    Cataract     s/p phacoemulsification    Chronic kidney disease (CKD), stage III (moderate) 9/12/2013    Diabetic peripheral neuropathy associated with type 2 diabetes mellitus 11/14/2014    causing right hemiparesis    Gait disorder     Hyperlipidemia     Iritis - Both Eyes 6/10/2013    Kidney stone     Lymphedema     Morbid obesity with BMI of 40.0-44.9, adult 2/18/2015     Nephrolithiasis 4/20/2016    NS (nuclear sclerosis) 4/1/2013    Nuclear sclerosis - Both Eyes 4/29/2013    Preseptal cellulitis - Right Eye 4/29/2013    Proliferative diabetic retinopathy - Both Eyes 4/29/2013    Proliferative diabetic retinopathy, both eyes 4/1/2013    PSC (posterior subcapsular cataract) - Both Eyes 4/29/2013    S/P hernia repair 12/19/2012    TIA (transient ischemic attack) 11/18/2014    Tinea pedis 7/24/2012    Tinea pedis is present on both feet.     Type 2 diabetes mellitus with renal manifestations, controlled 12/12/2013    Type 2 diabetes, controlled, with moderate nonproliferative diabetic retinopathy without macular edema 9/17/2015    Ulcer of left lower extremity, limited to breakdown of skin 7/8/2015    Unspecified cerebral artery occlusion with cerebral infarction 11/16/2014    Unspecified venous (peripheral) insufficiency     Ureteral stone with hydronephrosis 1/27/2016    UTI (lower urinary tract infection)     Vaginal infection     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     Past Surgical History:   Procedure Laterality Date    APPENDECTOMY      CATARACT EXTRACTION W/  INTRAOCULAR LENS IMPLANT Left 5/21/2013    CATARACT EXTRACTION W/  INTRAOCULAR LENS IMPLANT Right 6/4/2013    CHOLECYSTECTOMY      COLONOSCOPY  12/22/2005    normal    CYSTOSCOPY  4/20/2016    [facility]    CYSTOSCOPY WITH STENT PLACEMENT Right 1/27/2016    [facility] at [facility] OR    ESOPHAGOGASTRODUODENOSCOPY  12/21/2015    hiatal hernia, Schatzki ring    ESOPHAGOGASTRODUODENOSCOPY (EGD) N/A 6/30/2014    [facility] (2ND F[facility])    EYE SURGERY Bilateral 2008    laser surgery both eyes    INCISION AND DRAINAGE, HAND - dorsal wrist - possible arthrotomy - hand pan - hand table on stretcher - hand drapes - cysto tubing - cultures - 4x15in splint materials Left 5/10/2019    Performed by Fátima TALBOT  MD [facility] at [facility] OR 1ST FLR    INSERTION, IOL PROSTHESIS Right 2013    Performed by Federico Schwartz MD at [facility] OR 1ST FLR    INSERTION, IOL PROSTHESIS Left 2013    Performed by [facility]ico Schwartz [facility] at [facility] OR 1ST FLR    NASAL SEPTUM SURGERY      PHACOEMULSIFICATION, CATARACT Right 2013    Performed by [facility]ico Schwartz [facility] at [facility] OR 1ST FLR    PHACOEMULSIFICATION, CATARACT Left 2013    Performed by [facility]ico Schwartz MD at [facility] OR 1ST FLR    PYELOGRAM-RETROGRADE Right 2016    [facility]    REMOVAL-STENT-URETERAL Right 2016    [facility] at [facility]    SUBTOTAL COLECTOMY  2012    transverse colon, for incarcerated umbilical hernia, Dr. Kat Trujillo-Katie    URETEROSCOPY Right 2016    Per[facility] by [facility] at [facility] OR       Family History     Problem Relation (Age of Onset)    Cataracts Sister, Brother    Diabetes Sister    Heart disease Brother    Leukemia Mother        Tobacco Use    Smoking status: Former Smoker     Packs/day: 0.50     Years: 15.00     Pack years: 7.50     Types: Cigarettes     Last attempt to quit: 1982     Years since quittin.8    Smokeless tobacco: Former User    Tobacco comment: smoked one pack per week   Substance and Sexual Activity    Alcohol use: No    Drug use: No    Sexual activity: Not Currently     Review of Systems   Constitutional: Negative for chills and fever.   Cardiovascular: Negative for leg swelling.   Gastrointestinal: Negative for nausea and vomiting.   Musculoskeletal: Negative for arthralgias and joint swelling.   Skin: Positive for wound. Negative for rash.   Psychiatric/Behavioral: Negative for agitation and confusion.     Objective:     Vital Signs (Most Recent):  Temp: 97 °F (36.1 °C) (19)  Pulse: (!) 57 (19)  Resp: 18 (19)  BP: (!) 156/67 (19)  SpO2: 96 % (19  2000) Vital Signs (24h Range):  Temp:  [97 °F (36.1 °C)] 97 °F (36.1 °C)  Pulse:  [57] 57  Resp:  [18] 18  SpO2:  [96 %] 96 %  BP: (156)/(67) 156/67     Weight: 127 kg (279 lb 15.7 oz)  Body mass index is 43.85 kg/m².    Foot Exam    General  Orientation: alert and oriented to person, place, and time   Affect: appropriate       Right Foot/Ankle     Inspection and Palpation  Ecchymosis: none  Tenderness: none   Swelling: none     Neurovascular  Dorsalis pedis: 1+  Posterior tibial: 1+  Saphenous nerve sensation: diminished  Tibial nerve sensation: diminished  Superficial peroneal nerve sensation: diminished  Deep peroneal nerve sensation: diminished  Sural nerve sensation: diminished      Left Foot/Ankle      Inspection and Palpation  Ecchymosis: none  Tenderness: none   Swelling: none     Neurovascular  Dorsalis pedis: 1+  Posterior tibial: 1+  Saphenous nerve sensation: diminished  Tibial nerve sensation: diminished  Superficial peroneal nerve sensation: diminished  Deep peroneal nerve sensation: diminished  Sural nerve sensation: diminished            Laboratory:  A1C:   Recent Labs   Lab 03/22/19  1744   HGBA1C 9.8*     CBC:   Recent Labs   Lab 05/14/19  0412   WBC 4.68   RBC 3.50*   HGB 10.0*   HCT 31.5*      MCV 90   MCH 28.6   MCHC 31.7*     CMP:   Recent Labs   Lab 05/14/19  0412   GLU 80   CALCIUM 8.6*      K 4.3   CO2 22*      BUN 32*   CREATININE 1.1     CRP:   Recent Labs   Lab 05/09/19  1322   CRP 98.4*     ESR:   Recent Labs   Lab 05/09/19  1322   SEDRATE 104*     Prealbumin: No results for input(s): PREALBUMIN in the last 48 hours.  Wound Cultures:   Recent Labs   Lab 03/22/19  2200 03/23/19  1119 05/09/19  2003 05/09/19  2008 05/10/19  1503   LABAERO KLEBSIELLA OXYTOCA  Moderate    MORGANELLA MORGANII  Moderate    METHICILLIN RESISTANT STAPHYLOCOCCUS AUREUS  Many    PROTEUS MIRABILIS  Few    PSEUDOMONAS AERUGINOSA  Many    PROVIDENCIA STUARTII  Many  Skin lyndsey also present     PSEUDOMONAS AERUGINOSA  Many    KLEBSIELLA OXYTOCA  Moderate   ENTEROBACTER AEROGENES  Few    METHICILLIN RESISTANT STAPHYLOCOCCUS AUREUS  Many    ENTEROBACTER CLOACAE  Few  Skin lyndsey also present   No growth No growth STREPTOCOCCUS GROUP C  Many  Beta-hemolytic streptococci are routinely susceptible to   penicillins,cephalosporins and carbapenems.  Susceptibility testing not routinely performed    PROTEUS MIRABILIS  Few  Skin lyndsey also present       Microbiology Results (last 7 days)     Procedure Component Value Units Date/Time    Culture, Anaerobe [[id number]] Collected:  05/10/19 1503    Order Status:  Completed Specimen:  Wound from Toe, Right Foot Updated:  05/14/19 1339     Anaerobic Culture --     FINEGOLDIA MAGNA  Moderate      Fungus culture [[id number]] Collected:  05/09/19 2003    Order Status:  Completed Specimen:  Joint Fluid from Wrist, Left Updated:  05/14/19 0954     Fungus (Mycology) Culture Culture in progress    Fungus culture [[id number]] Collected:  05/09/19 2008    Order Status:  Completed Specimen:  Joint Fluid from Hand, Left Updated:  05/14/19 0954     Fungus (Mycology) Culture Culture in progress    Culture, Anaerobic [[id number]] Collected:  05/09/19 2008    Order Status:  Completed Specimen:  Joint Fluid from Hand, Left Updated:  05/14/19 0932     Anaerobic Culture Culture in progress    Culture, Anaerobic [[id number]] Collected:  05/09/19 2003    Order Status:  Completed Specimen:  Joint Fluid from Wrist, Left Updated:  05/14/19 0931     Anaerobic Culture Culture in progress    Aerobic culture [[id number]]  (Susceptibility) Collected:  05/10/19 1503    Order Status:  Completed Specimen:  Wound from Toe, Right Foot Updated:  05/13/19 1112     Aerobic Bacterial Culture --     STREPTOCOCCUS GROUP C  Many  Beta-hemolytic streptococci are routinely susceptible to   penicillins,cephalosporins and carbapenems.  Susceptibility testing not routinely performed       Aerobic Bacterial Culture --      PROTEUS MIRABILIS  Few  Skin lyndsey also present      Aerobic culture [[id number]] Collected:  05/09/19 2003    Order Status:  Completed Specimen:  Joint Fluid from Wrist, Left Updated:  05/13/19 0804     Aerobic Bacterial Culture No growth    Aerobic culture [[id number]] Collected:  05/09/19 2008    Order Status:  Completed Specimen:  Joint Fluid from Hand, Left Updated:  05/13/19 0804     Aerobic Bacterial Culture No growth    Urine culture [[id number]] Collected:  05/10/19 1630    Order Status:  Completed Specimen:  Urine Updated:  05/12/19 0656     Urine Culture, Routine No significant growth    Narrative:       yellow and gray tube,one orange top cup  Preferred Collection Type->Urine, Clean Catch    AFB Culture & Smear [[id number]] Collected:  05/09/19 2003    Order Status:  Completed Specimen:  Joint Fluid from Wrist, Left Updated:  05/10/19 2127     AFB Culture & Smear Culture in progress     AFB CULTURE STAIN No acid fast bacilli seen.    AFB Culture & Smear [[id number]] Collected:  05/09/19 2008    Order Status:  Completed Specimen:  Joint Fluid from Hand, Left Updated:  05/10/19 2127     AFB Culture & Smear Culture in progress     AFB CULTURE STAIN No acid fast bacilli seen.    Gram stain [[id number]] Collected:  05/09/19 2008    Order Status:  Completed Specimen:  Joint Fluid from Hand, Left Updated:  05/09/19 2111     Gram Stain Result Rare WBC's      No organisms seen    Gram stain [[id number]] Collected:  05/09/19 2003    Order Status:  Completed Specimen:  Joint Fluid from Wrist, Left Updated:  05/09/19 2109     Gram Stain Result Rare WBC's      No organisms seen          Diagnostic Results:  X-ray: Erosive changes of the distal phalanx of the great toe with associated toe wound which are concerning for osteomyelitis.  MRI could be performed for further evaluation.    MRI: ordered    DISHA: 3/24  Ankle-brachial indices unable to be obtained secondary to the positioning of bilateral lower extremity  bandages.    Diffuse bilateral atherosclerosis with no hemodynamically significant stenosis demonstrated in the right or left lower extremity arterial system.    Clinical Findings:  Ulcer Location: right distal hallux  Measurements:1.0x1.0x0.2  Periwound: hyperkeratotic  Drainage: scant serosanguinous  Base: granular  Signs of infection: none    Multiple LE venous stasis ulcers that are fibrogranular, no signs of infection.    5/14 5/11/19:            5/10/19:                        Assessment/Plan:     Toe ulcer  Sherin Ortiz is a 74 y.o. female with superifical toe ulcer, no acute SOI or purulent drainage noted, appears stable.   -Imaging with OM noted, will discuss with staff  -Abx per ID, appreciate recs  -Wound dressed with iodosorb and loose kerlix secured with tape.    -nursing orders in for dressing changes  -Podiatry will follow    Cellulitis of left hand  Per ortho    PAOD (peripheral arterial occlusive disease)  Per primary      Uncontrolled type 2 diabetes mellitus with stage 3 chronic kidney disease, with long-term current use of insulin  A1c 9.8  Per primary          Paulino Kuo MD  Podiatry  [facility]

## 2019-05-15 ENCOUNTER — LAB VISIT (OUTPATIENT)
Dept: LAB | Facility: HOSPITAL | Age: 74
End: 2019-05-15
Attending: INTERNAL MEDICINE
Payer: MEDICARE

## 2019-05-15 DIAGNOSIS — L70.9 SAPHO SYNDROME: Primary | ICD-10-CM

## 2019-05-15 DIAGNOSIS — L40.3 SAPHO SYNDROME: Primary | ICD-10-CM

## 2019-05-15 DIAGNOSIS — M85.80 SAPHO SYNDROME: Primary | ICD-10-CM

## 2019-05-15 DIAGNOSIS — M86.9 SAPHO SYNDROME: Primary | ICD-10-CM

## 2019-05-15 DIAGNOSIS — M65.9 SAPHO SYNDROME: Primary | ICD-10-CM

## 2019-05-15 LAB
ALBUMIN SERPL BCP-MCNC: 2.4 G/DL (ref 3.5–5.2)
ALP SERPL-CCNC: 202 U/L (ref 55–135)
ALT SERPL W/O P-5'-P-CCNC: 6 U/L (ref 10–44)
ANION GAP SERPL CALC-SCNC: 11 MMOL/L (ref 8–16)
AST SERPL-CCNC: 16 U/L (ref 10–40)
BILIRUB SERPL-MCNC: 0.4 MG/DL (ref 0.1–1)
BUN SERPL-MCNC: 31 MG/DL (ref 8–23)
CALCIUM SERPL-MCNC: 9.6 MG/DL (ref 8.7–10.5)
CHLORIDE SERPL-SCNC: 103 MMOL/L (ref 95–110)
CO2 SERPL-SCNC: 23 MMOL/L (ref 23–29)
CREAT SERPL-MCNC: 1.8 MG/DL (ref 0.5–1.4)
CRP SERPL-MCNC: 46.5 MG/L (ref 0–8.2)
ERYTHROCYTE [DISTWIDTH] IN BLOOD BY AUTOMATED COUNT: 14.4 % (ref 11.5–14.5)
EST. GFR  (AFRICAN AMERICAN): 31.1 ML/MIN/1.73 M^2
EST. GFR  (NON AFRICAN AMERICAN): 27 ML/MIN/1.73 M^2
GLUCOSE SERPL-MCNC: 314 MG/DL (ref 70–110)
HCT VFR BLD AUTO: 36.1 % (ref 37–48.5)
HGB BLD-MCNC: 11.3 G/DL (ref 12–16)
MCH RBC QN AUTO: 28.2 PG (ref 27–31)
MCHC RBC AUTO-ENTMCNC: 31.3 G/DL (ref 32–36)
MCV RBC AUTO: 90 FL (ref 82–98)
PLATELET # BLD AUTO: 377 K/UL (ref 150–350)
PMV BLD AUTO: 9.5 FL (ref 9.2–12.9)
POTASSIUM SERPL-SCNC: 4.2 MMOL/L (ref 3.5–5.1)
PROT SERPL-MCNC: 7.9 G/DL (ref 6–8.4)
RBC # BLD AUTO: 4.01 M/UL (ref 4–5.4)
SODIUM SERPL-SCNC: 137 MMOL/L (ref 136–145)
WBC # BLD AUTO: 6.79 K/UL (ref 3.9–12.7)

## 2019-05-15 PROCEDURE — 80053 COMPREHEN METABOLIC PANEL: CPT | Mod: HCNC

## 2019-05-15 PROCEDURE — 86140 C-REACTIVE PROTEIN: CPT | Mod: HCNC

## 2019-05-15 PROCEDURE — 85027 COMPLETE CBC AUTOMATED: CPT | Mod: HCNC

## 2019-05-15 NOTE — NURSING
Patient remains AAOx4. Patient had no c/o pain today. Dressings changed on BLE per wound care instructions. IV therapy representative taught and provided equipment to patient at beside. Discharge instructions reviewed. All questions asked and answered. Patient left with son in no apparent distress.

## 2019-05-15 NOTE — PHYSICIAN QUERY
PT Name: Sherin Ortiz  MR #: [id number]    Physician Query Form - Consultant Diagnosis Clarification     CDS/: Alta Gloria RN               Contact information:[email or web address]  This form is a permanent document in the medical record.     Query Date: May 15, 2019      By submitting this query, we are merely seeking further clarification of documentation.  Please utilize your independent clinical judgment when addressing the question(s) below.      The Medical record contains the following:   Diagnosis Supporting Clinical Information Location in Medical Record   POA: Venous ulcer L anterior leg; posterior calf, Venous ulcer R lower leg, Diabetic 1st toe ulcer, Pressure Injury R heel unstageable, Pressure injury L dorsal foot and heel unstageable All wounds noted as POA:  1. Venous ulcer L anterior leg; posterior calf  2. Venous ulcer R lower leg  3. Diabetic 1st toe ulcer  4. Pressure Injury R heel unstageable  5. Pressure injury L dorsal foot and heel unstageable    Acute osteomyelitis R great toe, acute gout L hand, prednisone initiated. Uncontrolled type 2 diabetes mellitus with stage 3 chronic kidney disease, with long-term current use of insulin, CKD3    right toe wound with acute osteomyelitis, Toe ulcer , Ulcer of L heel and midfoot, Venous stasis ulcers of both lower extremities    Wound care PN 5/10            Discharge summary        Discharge summary         Do you agree with the Consultants diagnosis of _POA: Venous ulcer L anterior leg; posterior calf, Venous ulcer R lower leg, Diabetic 1st toe ulcer, Pressure Injury R heel unstageable, Pressure injury L dorsal foot and heel unstageable____?    [ X ] Yes   [  ] No   [  ] Clinically insignificant   [  ] Other/Clarification of findings:   [  ] Clinically undetermined

## 2019-05-15 NOTE — PHYSICIAN QUERY
PT Name: Sherin Ortiz  MR #: [id number]     Physician Query Form - Documentation Clarification      CDS/: Alta Gloria RN               Contact information:[email or web address]    This form is a permanent document in the medical record.     Query Date: May 15, 2019    By submitting this query, we are merely seeking further clarification of documentation. Please utilize your independent clinical judgment when addressing the question(s) below.    The Medical record reflects the following:    Supporting Clinical Findings Location in Medical Record   Glucose 333  Glucose 162  Glucose 224  Glucose 248  Glucose 183  Glucose 80    Insulin detemir  Insulin aspart  Prednisone oral Labs 5/9  Labs 5/10  Labs 5/11  Labs 5/12  Labs 5/13  Labs 5/14    MAR 5/9  MAR 5/9  MAR 5/10     Acute osteomyelitis R great toe, acute gout L hand, prednisone initiated. Uncontrolled type 2 diabetes mellitus with stage 3 chronic kidney disease, with long-term current use of insulin, CKD3   Discharge summary                                                                            Doctor, Please specify diagnosis or diagnoses associated with above clinical findings.    Provider Use Only      [   ] HTN  Is a manifestation of the diabetes    [  X ] HTN is not a manifestation of the diabetes    [   ] Other ______________(please specify)                                                                                                                 [  ] Clinically Undetermined

## 2019-05-16 ENCOUNTER — TELEPHONE (OUTPATIENT)
Dept: INFECTIOUS DISEASES | Facility: CLINIC | Age: 74
End: 2019-05-16

## 2019-05-16 ENCOUNTER — LAB VISIT (OUTPATIENT)
Dept: LAB | Facility: HOSPITAL | Age: 74
End: 2019-05-16
Attending: INTERNAL MEDICINE
Payer: MEDICARE

## 2019-05-16 ENCOUNTER — TELEPHONE (OUTPATIENT)
Dept: PODIATRY | Facility: CLINIC | Age: 74
End: 2019-05-16

## 2019-05-16 ENCOUNTER — PATIENT OUTREACH (OUTPATIENT)
Dept: ADMINISTRATIVE | Facility: CLINIC | Age: 74
End: 2019-05-16

## 2019-05-16 DIAGNOSIS — I13.10 MALIGNANT HYPERTENSIVE HEART AND RENAL DISEASE, WITH RENAL FAILURE: Primary | ICD-10-CM

## 2019-05-16 LAB
ALBUMIN SERPL BCP-MCNC: 2.5 G/DL (ref 3.5–5.2)
ALP SERPL-CCNC: 193 U/L (ref 55–135)
ALT SERPL W/O P-5'-P-CCNC: <5 U/L (ref 10–44)
ANION GAP SERPL CALC-SCNC: 10 MMOL/L (ref 8–16)
AST SERPL-CCNC: 21 U/L (ref 10–40)
BACTERIA SPEC ANAEROBE CULT: NORMAL
BACTERIA SPEC ANAEROBE CULT: NORMAL
BILIRUB SERPL-MCNC: 0.3 MG/DL (ref 0.1–1)
BUN SERPL-MCNC: 36 MG/DL (ref 8–23)
CALCIUM SERPL-MCNC: 9.4 MG/DL (ref 8.7–10.5)
CHLORIDE SERPL-SCNC: 103 MMOL/L (ref 95–110)
CO2 SERPL-SCNC: 23 MMOL/L (ref 23–29)
CREAT SERPL-MCNC: 1.7 MG/DL (ref 0.5–1.4)
EST. GFR  (AFRICAN AMERICAN): 33.3 ML/MIN/1.73 M^2
EST. GFR  (NON AFRICAN AMERICAN): 28.9 ML/MIN/1.73 M^2
GLUCOSE SERPL-MCNC: 273 MG/DL (ref 70–110)
POTASSIUM SERPL-SCNC: 3.9 MMOL/L (ref 3.5–5.1)
PROT SERPL-MCNC: 7.7 G/DL (ref 6–8.4)
SODIUM SERPL-SCNC: 136 MMOL/L (ref 136–145)

## 2019-05-16 PROCEDURE — 80053 COMPREHEN METABOLIC PANEL: CPT | Mod: HCNC

## 2019-05-16 NOTE — TELEPHONE ENCOUNTER
----- Message from Ernesto Sterling MD sent at 5/15/2019 10:07 PM CDT -----  Patient's creatinine is elevated.  Notify patient and home health to hold antibiotics for now.  Repeat CMP STAT.  Thanks.

## 2019-05-16 NOTE — TELEPHONE ENCOUNTER
Attempted X2 to contact patient but phone rang once and then disconnected both times--appt made, slip mailed

## 2019-05-16 NOTE — PATIENT INSTRUCTIONS
Discharge Instructions for Osteomyelitis  You have a condition called osteomyelitis. This is a bone infection caused by bacteria or fungi. The infection may have come from one area of your body and spread to the bone by traveling through the blood. Osteomyelitis is described as acute when the infection is new, and chronic when you have had the infection for a longer time. If you have any questions or concerns, call your healthcare provider.  Home care  · Take your medicine exactly as directed. If you were given antibiotics or antifungal medicine, make sure you finish the prescription--even if you feel better. If you dont finish the medicine, the infection may return and may make future infections harder to treat.  · Be careful not to injure the area where you have the infection.  · Carefully follow all instructions for taking care of any wounds.  · Use a splint, sling, or brace as directed by your healthcare provider.  Follow-up care  Make a follow-up appointment as directed by our staff.  When to seek medical care  Call your healthcare provider if you have any of the following:  · Increasing pain, redness, swelling, or drainage in the infected area  · Fever 100.4°F (38°C) or greater, or as advised  · Increasing fatigue or feeling tired   Date Last Reviewed: 12/1/2016  © 4273-7553 [employer]. [address] [place] [address], [place] [address]. All rights reserved. This information is not intended as a substitute for professional medical care. Always follow your healthcare professional's instructions.

## 2019-05-16 NOTE — TELEPHONE ENCOUNTER
----- Message from Paulino Stone MD sent at 5/16/2019  7:23 AM CDT -----  Please make an appointment for the above patient within the next 15 days for osteomyelitis.    Patient was seen as inpatient by podiatry and was recently discharged.  If patient declines an appointment please let me know.    Thanks!    Paulino Stone

## 2019-05-17 ENCOUNTER — TELEPHONE (OUTPATIENT)
Dept: INFECTIOUS DISEASES | Facility: CLINIC | Age: 74
End: 2019-05-17

## 2019-05-17 NOTE — TELEPHONE ENCOUNTER
----- Message from Sayda Ferreira MA sent at 5/17/2019 11:46 AM CDT -----  Aubrey called with [facility]. He said that her Creatinine dropped.     Creatinine was 1.9  Creatinine is now 1.7    Do you want to restart ABX?

## 2019-05-17 NOTE — TELEPHONE ENCOUNTER
Ask the infusion company if we can change the cefazolin to ceftriaxone 2 grams IV q 24 hours.  Same stop date of June 21.  Thank you.

## 2019-05-20 ENCOUNTER — PATIENT OUTREACH (OUTPATIENT)
Dept: ADMINISTRATIVE | Facility: HOSPITAL | Age: 74
End: 2019-05-20

## 2019-05-20 NOTE — PROGRESS NOTES
Spoke with pt, reminded of PCP visit 5-29-19, scheduled her Diabetic Eye Exam, she declined to have me schedule her Dexa Scan at this time. She was also reminded that she will need a urine specimen for Urine Microalbumin .

## 2019-05-21 ENCOUNTER — LAB VISIT (OUTPATIENT)
Dept: LAB | Facility: HOSPITAL | Age: 74
End: 2019-05-21
Attending: INTERNAL MEDICINE
Payer: MEDICARE

## 2019-05-21 DIAGNOSIS — M85.80 SAPHO SYNDROME: Primary | ICD-10-CM

## 2019-05-21 DIAGNOSIS — L70.9 SAPHO SYNDROME: Primary | ICD-10-CM

## 2019-05-21 DIAGNOSIS — M65.9 SAPHO SYNDROME: Primary | ICD-10-CM

## 2019-05-21 DIAGNOSIS — L40.3 SAPHO SYNDROME: Primary | ICD-10-CM

## 2019-05-21 DIAGNOSIS — M86.9 SAPHO SYNDROME: Primary | ICD-10-CM

## 2019-05-21 LAB
ALBUMIN SERPL BCP-MCNC: 2.7 G/DL (ref 3.5–5.2)
ALP SERPL-CCNC: 210 U/L (ref 55–135)
ALT SERPL W/O P-5'-P-CCNC: 5 U/L (ref 10–44)
ANION GAP SERPL CALC-SCNC: 15 MMOL/L (ref 8–16)
AST SERPL-CCNC: 24 U/L (ref 10–40)
BASOPHILS # BLD AUTO: 0.05 K/UL (ref 0–0.2)
BASOPHILS NFR BLD: 0.9 % (ref 0–1.9)
BILIRUB SERPL-MCNC: 0.3 MG/DL (ref 0.1–1)
BUN SERPL-MCNC: 29 MG/DL (ref 8–23)
CALCIUM SERPL-MCNC: 9.2 MG/DL (ref 8.7–10.5)
CHLORIDE SERPL-SCNC: 109 MMOL/L (ref 95–110)
CO2 SERPL-SCNC: 19 MMOL/L (ref 23–29)
CREAT SERPL-MCNC: 1.4 MG/DL (ref 0.5–1.4)
CRP SERPL-MCNC: 27.3 MG/L (ref 0–8.2)
DIFFERENTIAL METHOD: ABNORMAL
EOSINOPHIL # BLD AUTO: 0.1 K/UL (ref 0–0.5)
EOSINOPHIL NFR BLD: 2.3 % (ref 0–8)
ERYTHROCYTE [DISTWIDTH] IN BLOOD BY AUTOMATED COUNT: 15 % (ref 11.5–14.5)
ERYTHROCYTE [SEDIMENTATION RATE] IN BLOOD BY WESTERGREN METHOD: 72 MM/HR (ref 0–36)
EST. GFR  (AFRICAN AMERICAN): 42.1 ML/MIN/1.73 M^2
EST. GFR  (NON AFRICAN AMERICAN): 36.5 ML/MIN/1.73 M^2
GLUCOSE SERPL-MCNC: 96 MG/DL (ref 70–110)
HCT VFR BLD AUTO: 35.6 % (ref 37–48.5)
HGB BLD-MCNC: 11.1 G/DL (ref 12–16)
IMM GRANULOCYTES # BLD AUTO: 0.01 K/UL (ref 0–0.04)
IMM GRANULOCYTES NFR BLD AUTO: 0.2 % (ref 0–0.5)
LYMPHOCYTES # BLD AUTO: 1.4 K/UL (ref 1–4.8)
LYMPHOCYTES NFR BLD: 25 % (ref 18–48)
MCH RBC QN AUTO: 28.8 PG (ref 27–31)
MCHC RBC AUTO-ENTMCNC: 31.2 G/DL (ref 32–36)
MCV RBC AUTO: 93 FL (ref 82–98)
MONOCYTES # BLD AUTO: 0.5 K/UL (ref 0.3–1)
MONOCYTES NFR BLD: 8.3 % (ref 4–15)
NEUTROPHILS # BLD AUTO: 3.6 K/UL (ref 1.8–7.7)
NEUTROPHILS NFR BLD: 63.3 % (ref 38–73)
NRBC BLD-RTO: 0 /100 WBC
PLATELET # BLD AUTO: 287 K/UL (ref 150–350)
PMV BLD AUTO: 9.8 FL (ref 9.2–12.9)
POTASSIUM SERPL-SCNC: 3.6 MMOL/L (ref 3.5–5.1)
PROT SERPL-MCNC: 8 G/DL (ref 6–8.4)
RBC # BLD AUTO: 3.85 M/UL (ref 4–5.4)
SODIUM SERPL-SCNC: 143 MMOL/L (ref 136–145)
WBC # BLD AUTO: 5.63 K/UL (ref 3.9–12.7)

## 2019-05-21 PROCEDURE — 85025 COMPLETE CBC W/AUTO DIFF WBC: CPT | Mod: HCNC

## 2019-05-21 PROCEDURE — 86140 C-REACTIVE PROTEIN: CPT | Mod: HCNC

## 2019-05-21 PROCEDURE — 85652 RBC SED RATE AUTOMATED: CPT | Mod: HCNC

## 2019-05-21 PROCEDURE — 80053 COMPREHEN METABOLIC PANEL: CPT | Mod: HCNC

## 2019-05-22 ENCOUNTER — OFFICE VISIT (OUTPATIENT)
Dept: WOUND CARE | Facility: CLINIC | Age: 74
End: 2019-05-22
Payer: MEDICARE

## 2019-05-22 VITALS
HEIGHT: 67 IN | DIASTOLIC BLOOD PRESSURE: 75 MMHG | TEMPERATURE: 97 F | SYSTOLIC BLOOD PRESSURE: 142 MMHG | BODY MASS INDEX: 42.38 KG/M2 | WEIGHT: 270 LBS | HEART RATE: 65 BPM

## 2019-05-22 DIAGNOSIS — I87.2 VENOUS INSUFFICIENCY OF BOTH LOWER EXTREMITIES: Chronic | ICD-10-CM

## 2019-05-22 DIAGNOSIS — L97.922 ULCER OF LOWER LIMB, LEFT, WITH FAT LAYER EXPOSED: ICD-10-CM

## 2019-05-22 DIAGNOSIS — L97.412 ULCER OF RIGHT HEEL AND MIDFOOT WITH FAT LAYER EXPOSED: ICD-10-CM

## 2019-05-22 DIAGNOSIS — L97.512 SKIN ULCER OF TOE OF RIGHT FOOT WITH FAT LAYER EXPOSED: Primary | ICD-10-CM

## 2019-05-22 DIAGNOSIS — L97.911 ULCER OF RIGHT LOWER EXTREMITY, LIMITED TO BREAKDOWN OF SKIN: ICD-10-CM

## 2019-05-22 DIAGNOSIS — R60.0 BILATERAL LEG EDEMA: ICD-10-CM

## 2019-05-22 PROBLEM — L97.521 ULCER OF TOE OF LEFT FOOT, LIMITED TO BREAKDOWN OF SKIN: Status: RESOLVED | Noted: 2019-02-13 | Resolved: 2019-05-22

## 2019-05-22 PROBLEM — L97.421: Status: RESOLVED | Noted: 2019-02-13 | Resolved: 2019-05-22

## 2019-05-22 PROCEDURE — 99499 UNLISTED E&M SERVICE: CPT | Mod: HCNC,S$GLB,, | Performed by: NURSE PRACTITIONER

## 2019-05-22 PROCEDURE — 99999 PR PBB SHADOW E&M-EST. PATIENT-LVL IV: ICD-10-PCS | Mod: PBBFAC,HCNC,, | Performed by: NURSE PRACTITIONER

## 2019-05-22 PROCEDURE — 29580 PR STRAPPING UNNA BOOT: ICD-10-PCS | Mod: 50,HCNC,S$GLB, | Performed by: NURSE PRACTITIONER

## 2019-05-22 PROCEDURE — 99499 NO LOS: ICD-10-PCS | Mod: HCNC,S$GLB,, | Performed by: NURSE PRACTITIONER

## 2019-05-22 PROCEDURE — 29580 STRAPPING UNNA BOOT: CPT | Mod: 50,HCNC,S$GLB, | Performed by: NURSE PRACTITIONER

## 2019-05-22 PROCEDURE — 99999 PR PBB SHADOW E&M-EST. PATIENT-LVL IV: CPT | Mod: PBBFAC,HCNC,, | Performed by: NURSE PRACTITIONER

## 2019-05-22 NOTE — PROGRESS NOTES
Subjective:       Patient ID: Sherin Ortiz is a 74 y.o. female.    Chief Complaint: Wound Check    Wound Check     Wound Check:   This patient is seen today for reevaluation of recurrent ulcers to both feet and lower legs.  She was admitted to [facility] from 5/9-5/14/17 and while there it was discovered that she has osteo of the right great toe.  She is on IV antibiotics and this is being managed through [facility]. She has home health services through [facility]. Problems that interfere with wound healing include multiple co-morbidities, diabetes, edema, diabetic neuropathy and  morbid obesity. Because of safety issues we are unable to weigh the patient as she is unstable on her feet.  The patient is afebrile. The patient ambulates with great difficulty secondary to her body habitus and uses a motorized wheelchair which we cannot get on the scale to weigh her.  She does not complain of pain.  She denies increased swelling, redness or purulent drainage.     Review of Systems    Unchanged from previous visit.  Objective:      Physical Exam   Constitutional: She is oriented to person, place, and time. No distress.   Morbidly obese   HENT:   Head: Normocephalic and atraumatic.   Neck: Normal range of motion. Neck supple.   Pulmonary/Chest: Effort normal.   Musculoskeletal: Normal range of motion. She exhibits edema. She exhibits no tenderness.        Legs:       Feet:    Neurological: She is alert and oriented to person, place, and time.   Skin: Skin is warm and dry. Rash (dermatitis and tinea bilateral lower extremities) noted. She is not diaphoretic. No cyanosis or erythema. No pallor. Nails show no clubbing.   Psychiatric: She has a normal mood and affect. Her behavior is normal. Judgment and thought content normal.   Nursing note and vitals reviewed.    ..  Hemoglobin A1C   Date Value Ref Range Status   03/22/2019 9.8 (H) 4.0 - 5.6 % Final     Comment:     ADA Screening Guidelines:  5.7-6.4%  Consistent with  prediabetes  >or=6.5%  Consistent with diabetes  High levels of fetal hemoglobin interfere with the HbA1C  assay. Heterozygous hemoglobin variants (HbS, HgC, etc)do  not significantly interfere with this assay.   However, presence of multiple variants may affect accuracy.     08/13/2018 10.6 (H) 4.0 - 5.6 % Final     Comment:     ADA Screening Guidelines:  5.7-6.4%  Consistent with prediabetes  >or=6.5%  Consistent with diabetes  High levels of fetal hemoglobin interfere with the HbA1C  assay. Heterozygous hemoglobin variants (HbS, HgC, etc)do  not significantly interfere with this assay.   However, presence of multiple variants may affect accuracy.     10/09/2017 9.9 (H) 4.0 - 5.6 % Final     Comment:     According to ADA guidelines, hemoglobin A1c <7.0% represents  optimal control in non-pregnant diabetic patients. Different  metrics may apply to specific patient populations.   Standards of Medical Care in Diabetes-2016.  For the purpose of screening for the presence of diabetes:  <5.7%     Consistent with the absence of diabetes  5.7-6.4%  Consistent with increasing risk for diabetes   (prediabetes)  >or=6.5%  Consistent with diabetes  Currently, no consensus exists for use of hemoglobin A1c  for diagnosis of diabetes for children.  This Hemoglobin A1c assay has significant interference with fetal   hemoglobin   (HbF). The results are invalid for patients with abnormal amounts of   HbF,   including those with known Hereditary Persistence   of Fetal Hemoglobin. Heterozygous hemoglobin variants (HbAS, HbAC,   HbAD, HbAE, HbA2) do not significantly interfere with this assay;   however, presence of multiple variants in a sample may impact the %   interference.       ..  Lab Results   Component Value Date    ALBUMIN 2.7 (L) 05/21/2019     Sherin was seen in the clinic room and placed in the supine position on the treatment table.  The legs were cleansed with Easi-clense sponges and dried thoroughly.  A mepilex lite foam  dressing was applied to the right shin wound.  Medihoney gel and a hydrofiber dressing was applied to the remaining wounds.  The right heel wound was covered with a double foam dressing. Cotton was placed between the toes. Eucerin cream was applied to the lower legs.  The patient's foot was positioned at a 90 degree angle.  A zinc oxide wrap, followed by kerlix roll gauze and coban were applied using a spiral technique avoiding creases or folds.  The wraps were started behind the first metatarsal and ended below the tibial tubercle of the knee.  There was overlap of each turn half the width of the previous turn.  The compression wraps will be changed every 3-4 days.    Assessment:       1. Skin ulcer of toe of right foot with fat layer exposed    2. Ulcer of lower limb, left, with fat layer exposed    3. Ulcer of right lower extremity, limited to breakdown of skin    4. Ulcer of right heel and midfoot with fat layer exposed    5. Venous insufficiency of both lower extremities    6. Bilateral leg edema        Plan:           Unna boots bilateral lower legs as detailed above.  Patient was warned not to get the dressings wet and to use cast covers for showering.  Should the dressing become wet, she is to remove it, place a wet-to-dry dressing over the wound, cover with gauze and roll gauze and use ace wraps for compression and to secure bandages.  She should then notify home health as soon as possible to have a new dressing applied.  Return to clinic in 4 weeks.  [facility] notified of orders via email. We will ask them to continue to see the patient two times weekly.    Home Health Orders:    Triamcinolone cream to bilateral lower legs.  Ketoconazole cream to bot feet.  Cleanse wounds with wound cleanser.  Apply mepilex lite foam dressing to right shin ulcer and right great toe ulcer.  Place cotton in between toes.  Apply medihoney gel and hydrofiber to remaining wounds.  Cover right medial heel wound with double  mepilex foam.  Unna boots bilateral lower legs (zinc oxide, kerlix and coban).  Skilled nurse visit-2 x weekly    Right shin    Left medial leg    Right great toe    Left dorsal foot    Right medial heel    Left calf                Left medial leg      Left posterior leg      Right dorsal foot      Left medial heel      Right anterior leg      Left dorsal foot      Right medial heel

## 2019-05-28 PROCEDURE — G0179 MD RECERTIFICATION HHA PT: HCPCS | Mod: ,,, | Performed by: INTERNAL MEDICINE

## 2019-05-28 PROCEDURE — G0179 PR HOME HEALTH MD RECERTIFICATION: ICD-10-PCS | Mod: ,,, | Performed by: INTERNAL MEDICINE

## 2019-05-29 ENCOUNTER — CLINICAL SUPPORT (OUTPATIENT)
Dept: OPTOMETRY | Facility: CLINIC | Age: 74
End: 2019-05-29
Payer: MEDICARE

## 2019-05-29 ENCOUNTER — OFFICE VISIT (OUTPATIENT)
Dept: INTERNAL MEDICINE | Facility: CLINIC | Age: 74
End: 2019-05-29
Payer: MEDICARE

## 2019-05-29 VITALS
DIASTOLIC BLOOD PRESSURE: 62 MMHG | OXYGEN SATURATION: 97 % | HEART RATE: 65 BPM | BODY MASS INDEX: 42.29 KG/M2 | SYSTOLIC BLOOD PRESSURE: 132 MMHG | HEIGHT: 67 IN

## 2019-05-29 DIAGNOSIS — I89.0 LYMPHEDEMA: Primary | ICD-10-CM

## 2019-05-29 DIAGNOSIS — R26.81 GAIT INSTABILITY: ICD-10-CM

## 2019-05-29 DIAGNOSIS — L97.919 ULCERS OF BOTH LOWER LEGS: ICD-10-CM

## 2019-05-29 DIAGNOSIS — R26.2 INABILITY TO WALK: ICD-10-CM

## 2019-05-29 DIAGNOSIS — I50.30 (HFPEF) HEART FAILURE WITH PRESERVED EJECTION FRACTION: ICD-10-CM

## 2019-05-29 DIAGNOSIS — L97.929 ULCERS OF BOTH LOWER LEGS: ICD-10-CM

## 2019-05-29 DIAGNOSIS — E66.01 MORBID OBESITY WITH BMI OF 40.0-44.9, ADULT: ICD-10-CM

## 2019-05-29 PROCEDURE — 1101F PR PT FALLS ASSESS DOC 0-1 FALLS W/OUT INJ PAST YR: ICD-10-PCS | Mod: HCNC,CPTII,S$GLB, | Performed by: INTERNAL MEDICINE

## 2019-05-29 PROCEDURE — 99215 OFFICE O/P EST HI 40 MIN: CPT | Mod: HCNC,S$GLB,, | Performed by: INTERNAL MEDICINE

## 2019-05-29 PROCEDURE — 1101F PT FALLS ASSESS-DOCD LE1/YR: CPT | Mod: HCNC,CPTII,S$GLB, | Performed by: INTERNAL MEDICINE

## 2019-05-29 PROCEDURE — 99999 PR PBB SHADOW E&M-EST. PATIENT-LVL I: ICD-10-PCS | Mod: PBBFAC,HCNC,,

## 2019-05-29 PROCEDURE — 3078F PR MOST RECENT DIASTOLIC BLOOD PRESSURE < 80 MM HG: ICD-10-PCS | Mod: HCNC,CPTII,S$GLB, | Performed by: INTERNAL MEDICINE

## 2019-05-29 PROCEDURE — 99499 UNLISTED E&M SERVICE: CPT | Mod: HCNC,S$GLB,, | Performed by: INTERNAL MEDICINE

## 2019-05-29 PROCEDURE — 92250 DIABETIC EYE SCREENING PHOTO: ICD-10-PCS | Mod: HCNC,S$GLB,, | Performed by: OPHTHALMOLOGY

## 2019-05-29 PROCEDURE — 99999 PR PBB SHADOW E&M-EST. PATIENT-LVL III: ICD-10-PCS | Mod: PBBFAC,HCNC,, | Performed by: INTERNAL MEDICINE

## 2019-05-29 PROCEDURE — 99999 PR PBB SHADOW E&M-EST. PATIENT-LVL I: CPT | Mod: PBBFAC,HCNC,,

## 2019-05-29 PROCEDURE — 99215 PR OFFICE/OUTPT VISIT, EST, LEVL V, 40-54 MIN: ICD-10-PCS | Mod: HCNC,S$GLB,, | Performed by: INTERNAL MEDICINE

## 2019-05-29 PROCEDURE — 99999 PR PBB SHADOW E&M-EST. PATIENT-LVL III: CPT | Mod: PBBFAC,HCNC,, | Performed by: INTERNAL MEDICINE

## 2019-05-29 PROCEDURE — 92250 FUNDUS PHOTOGRAPHY W/I&R: CPT | Mod: HCNC,S$GLB,, | Performed by: OPHTHALMOLOGY

## 2019-05-29 PROCEDURE — 3075F SYST BP GE 130 - 139MM HG: CPT | Mod: HCNC,CPTII,S$GLB, | Performed by: INTERNAL MEDICINE

## 2019-05-29 PROCEDURE — 3075F PR MOST RECENT SYSTOLIC BLOOD PRESS GE 130-139MM HG: ICD-10-PCS | Mod: HCNC,CPTII,S$GLB, | Performed by: INTERNAL MEDICINE

## 2019-05-29 PROCEDURE — 99499 RISK ADDL DX/OHS AUDIT: ICD-10-PCS | Mod: HCNC,S$GLB,, | Performed by: INTERNAL MEDICINE

## 2019-05-29 PROCEDURE — 3078F DIAST BP <80 MM HG: CPT | Mod: HCNC,CPTII,S$GLB, | Performed by: INTERNAL MEDICINE

## 2019-05-29 NOTE — PROGRESS NOTES
HPI     Diabetic Eye Exam      Additional comments: photos              Comments     Screening photos          Last edited by Charmaine Sánchez MA on 5/29/2019 11:30 AM. (History)            Assessment /Plan     For exam results, see Encounter Report.    There are no diagnoses linked to this encounter.  74 y.o. y/o here for screening for Diabetic Renopathy with non-dilated fundus photos per Ame Vasquez MD    Little to no view in OD, no view in OS

## 2019-05-29 NOTE — PROGRESS NOTES
"Subjective:      Patient ID: Sherin Ortiz is a 74 y.o. female.    Chief Complaint: Neurologic Problem (Mobility exam)    HPI:  HPI   Chief complaint : Mobility Exam    Patient has been approved in the past for a mobility device, her condition is worsening and not improving. She believes the last Hoveround chair was obtained about 10 years ago and this one is now "falling apart" and potentially becoming dangerous.    Height 5'7"  Weight 260 lbs  Edema, Leg sores on both legs that are chronic and has a neurologic condition that has not allowed her to walk for over 10 years. Her balance will not allow a walker . She is able to transfer.    The medical condition that has progressed over the years in lead to her being unable to walk was severe lymphedema bilaterally of her lower extremities.. She since has developed chronic ulcers on her legs and pictures are enclosed.    Patient has no strength in her legs with significant edema,limited range of motion and non-ambulatory.    Patient is non- ambulatory at this time in her life after progressive neurologic decline and dependent on the Hoveround with the inability to use a manual wheel chair either to pedal with her feel or roll with her abdullahi.    Patient is dependent on the Hoveround for all activities of daily living, including getting to the bathroom, getting to the kitchen to prepare meals and getting to her bedroom,    She is not able to use a can has she is non-ambulatory which is a permanent condition.    She does not have the upper arm strength for a manual wheel chair. A Scooter is not appropriate and would not have appropriate stability.    The patient has shown for over 10 years that she can safely operate the power mobilitiy device both mentally and physically. She remains willing and motivated to use the power mobility device in the home and for her medical appointments.    Enclosed are the notes from the Wound Care Team regarding the chronic sore on her " legs. The patient additionally has diabetes.  Patient Active Problem List   Diagnosis    Bilateral leg edema    Tinea pedis    Hyperlipidemia LDL goal <100    Essential hypertension    PSC (posterior subcapsular cataract) - Both Eyes    Nuclear sclerosis - Both Eyes    Asteroid hyalosis - Left Eye    Stage 3 chronic kidney disease    Dystrophic nail    Hyponatremia    Weakness    Inability to walk    Morbid obesity with BMI of 40.0-44.9, adult    Diabetic peripheral neuropathy associated with type 2 diabetes mellitus    Anemia of chronic disease    Hypoalbuminemia    Wheelchair dependent    Venous insufficiency of leg    Dermatitis, stasis    Uncontrolled type 2 diabetes mellitus with stage 3 chronic kidney disease, with long-term current use of insulin    Hyperparathyroidism    Aortic atherosclerosis    PAOD (peripheral arterial occlusive disease)    Right shoulder strain, initial encounter    Hx of transient ischemic attack (TIA)    (HFpEF) heart failure with preserved ejection fraction    Ulcer of lower extremity, limited to breakdown of skin    Ulcer of lower limb, left, with fat layer exposed    Skin ulcer of toe of right foot with fat layer exposed    Ulcer of right heel and midfoot with fat layer exposed    Cellulitis    Acute renal failure superimposed on stage 3 chronic kidney disease    Leukocytosis    Hyperglycemia    Prolonged Q-T interval on ECG    Hyperbilirubinemia    Goals of care, counseling/discussion    Ulcer of toe, right, with fat layer exposed    Acute gout of left hand    Left wrist pain    Venous stasis ulcers of both lower extremities    Dermatitis associated with moisture    Osteomyelitis of toe    Osteomyelitis     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    Benign essential hypertension 11/14/2012    Cataract     s/p phacoemulsification    Chronic kidney disease (CKD), stage III (moderate)  9/12/2013    Diabetic peripheral neuropathy associated with type 2 diabetes mellitus 11/14/2014    causing right hemiparesis    Gait disorder     Hyperlipidemia     Iritis - Both Eyes 6/10/2013    Kidney stone     Lymphedema     Morbid obesity with BMI of 40.0-44.9, adult 2/18/2015    Nephrolithiasis 4/20/2016    NS (nuclear sclerosis) 4/1/2013    Nuclear sclerosis - Both Eyes 4/29/2013    Preseptal cellulitis - Right Eye 4/29/2013    Proliferative diabetic retinopathy - Both Eyes 4/29/2013    Proliferative diabetic retinopathy, both eyes 4/1/2013    PSC (posterior subcapsular cataract) - Both Eyes 4/29/2013    S/P hernia repair 12/19/2012    TIA (transient ischemic attack) 11/18/2014    Tinea pedis 7/24/2012    Tinea pedis is present on both feet.     Type 2 diabetes mellitus with renal manifestations, controlled 12/12/2013    Type 2 diabetes, controlled, with moderate nonproliferative diabetic retinopathy without macular edema 9/17/2015    Ulcer of left lower extremity, limited to breakdown of skin 7/8/2015    Unspecified cerebral artery occlusion with cerebral infarction 11/16/2014    Unspecified venous (peripheral) insufficiency     Ureteral stone with hydronephrosis 1/27/2016    UTI (lower urinary tract infection)     Vaginal infection     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     Past Surgical History:   Procedure Laterality Date    APPENDECTOMY      CATARACT EXTRACTION W/  INTRAOCULAR LENS IMPLANT Left 5/21/2013    CATARACT EXTRACTION W/  INTRAOCULAR LENS IMPLANT Right 6/4/2013    CHOLECYSTECTOMY      COLONOSCOPY  12/22/2005    normal    CYSTOSCOPY  4/20/2016    [facility]    CYSTOSCOPY WITH STENT PLACEMENT Right 1/27/2016    [facility]    ESOPHAGOGASTRODUODENOSCOPY  12/21/2015    hiatal hernia, Schatzki ring    ESOPHAGOGASTRODUODENOSCOPY (EGD) N/A 6/30/2014    Performed by Jaspreet Ng [facility] at  [facility] (2ND FLR)    EYE SURGERY Bilateral 2008    laser surgery both eyes    INCISION AND DRAINAGE, HAND - dorsal wrist - possible arthrotomy - hand pan - hand table on stretcher - hand drapes - cysto tubing - cultures - 4x15in splint materials Left 5/10/2019    Performed by Fátima Jarquin MD at [facility] OR 1ST FLR    INSERTION, IOL PROSTHESIS Right 6/4/2013    Performed by Federico Schwartz [facility] at [facility] OR 1ST FLR    INSERTION, IOL PROSTHESIS Left 5/21/2013    Performed by [facility] at [facility] OR 1ST FLR    NASAL SEPTUM SURGERY      PHACOEMULSIFICATION, CATARACT Right 6/4/2013    Performed by [facility] at [facility] OR 1ST FLR    PHACOEMULSIFICATION, CATARACT Left 5/21/2013    Performed by Federico Schwartz MD at [facility] OR 1ST FLR    PYELOGRAM-RETROGRADE Right 4/20/2016    Performed by Oliver [facility]    REMOVAL-STENT-URETERAL Right 4/20/2016    [facility]    SUBTOTAL COLECTOMY  12/13/2012    transverse colon, for incarcerated umbilical hernia, Dr. Kat Bower    URETEROSCOPY Right 4/20/2016    Performed by [facility] at [facility] OR     Family History   Problem Relation Age of Onset    Diabetes Sister     Cataracts Sister     Heart disease Brother     Cataracts Brother     Leukemia Mother     Cancer Neg Hx     Amblyopia Neg Hx     Blindness Neg Hx     Glaucoma Neg Hx     Hypertension Neg Hx     Macular degeneration Neg Hx     Retinal detachment Neg Hx     Strabismus Neg Hx     Stroke Neg Hx     Thyroid disease Neg Hx     Kidney disease Neg Hx      Review of Systems   Patient complains of no shortness of breath cough for chest pain. She is not able to walk she is able to stand and transfer although she has had at least 1 fall.  She has recently been in the hospital for a cellulitis to the left hand.  She has appropriate follow-up scheduled.  Objective:     Vitals:    05/29/19 1005   BP:  "132/62   Pulse: 65   SpO2: 97%   Height: 5' 7" (1.702 m)   PainSc: 0-No pain     Body mass index is 42.29 kg/m².  Physical Exam   Constitutional: She is oriented to person, place, and time. She appears well-developed and well-nourished. No distress.   Neck: Carotid bruit is not present. No thyromegaly present.   Cardiovascular: Normal rate, regular rhythm and normal heart sounds. PMI is not displaced.   Pulmonary/Chest: Effort normal and breath sounds normal. No respiratory distress.   Abdominal: Soft. Bowel sounds are normal. She exhibits no distension. There is no tenderness.   Musculoskeletal: She exhibits edema.   Neurological: She is alert and oriented to person, place, and time.    patient's legs are wrapped with compression wraps and she has foods protecting her feet.  The patient presents to the appointment in the power mobility device.  She has always as long as I have known her required some form of mobility device and over the last 10 years the power mobility device as her condition worsen.  This device is obviously in very used and poor condition and should be replaced.  Assessment:     1. Lymphedema    2. Gait instability    3. Ulcers of both lower legs    4. (HFpEF) heart failure with preserved ejection fraction    5. Inability to walk    6. Morbid obesity with BMI of 40.0-44.9, adult    7. Uncontrolled type 2 diabetes mellitus with stage 3 chronic kidney disease, with long-term current use of insulin      Plan:   Sherin was seen today for neurologic problem.    Diagnoses and all orders for this visit:    Lymphedema  Comments:  Severe limp of edema that has progressed and resulted in chronic sores as she has developed diabetes.  She is unable to walk.    Gait instability  Comments:  Patient is able to stand and transfer in a limited fashion.  She is unable to walk and depends on her powered motor ability device for activities of dailyliving    Ulcers of both lower legs  Comments:  Followed in wound care, " she does have vascular follow-up scheduled    (HFpEF) heart failure with preserved ejection fraction  Comments:  Stable at this time    Inability to walk  Comments:  This will never improve    Morbid obesity with BMI of 40.0-44.9, adult  Comments:  On likely to improve    Uncontrolled type 2 diabetes mellitus with stage 3 chronic kidney disease, with long-term current use of insulin  Comments:  Followed with patient        Problem List Items Addressed This Visit     Uncontrolled type 2 diabetes mellitus with stage 3 chronic kidney disease, with long-term current use of insulin (Chronic)    Inability to walk    Morbid obesity with BMI of 40.0-44.9, adult    (HFpEF) heart failure with preserved ejection fraction      Other Visit Diagnoses     Lymphedema    -  Primary    Severe limp of edema that has progressed and resulted in chronic sores as she has developed diabetes.  She is unable to walk.    Gait instability        Patient is able to stand and transfer in a limited fashion.  She is unable to walk and depends on her powered motor ability device for activities of dailyliving    Ulcers of both lower legs        Followed in wound care, she does have vascular follow-up scheduled        No orders of the defined types were placed in this encounter.    Follow up in about 8 weeks (around 7/24/2019).     Medication List           Accurate as of 5/29/19  8:17 PM. If you have any questions, ask your nurse or doctor.               CHANGE how you take these medications    LANTUS U-100 INSULIN 100 unit/mL injection  Generic drug:  insulin glargine  INJECT 7 TO 10 UNITS SUBCUTANEOUSLY IN THE EVENING DEPENDING ON SCALE (DISCARD EACH VIAL AFTER 28 DAYS)  What changed:  See the new instructions.        CONTINUE taking these medications    ACCU-CHEK RAMIRO PLUS TEST STRP Strp  Generic drug:  blood sugar diagnostic  TEST TWICE DAILY     ACCU-CHEK SOFTCLIX LANCETS Misc  Generic drug:  lancets     aspirin 81 MG Chew     atorvastatin 40  "MG tablet  Commonly known as:  LIPITOR  TAKE 1 TABLET EVERY EVENING     BD INSULIN SYRINGE ULTRA-FINE 0.5 mL 30 gauge x 1/2" Syrg  Generic drug:  insulin syringe-needle U-100  USE EVERY NIGHT     ceFAZolin 1 gram injection  Commonly known as:  ANCEF  Inject 2,000 mg (2 g total) into the vein every 8 (eight) hours.     clopidogrel 75 mg tablet  Commonly known as:  PLAVIX  TAKE 1 TABLET EVERY DAY     colchicine 0.6 mg tablet  Commonly known as:  COLCRYS  Take 1 tablet (0.6 mg total) by mouth 2 (two) times daily. Until gout pain is resolved     miconazole nitrate 2% 2 % Oint  Commonly known as:  MICOTIN  Apply topically 2 (two) times daily.            "

## 2019-05-30 ENCOUNTER — OFFICE VISIT (OUTPATIENT)
Dept: PODIATRY | Facility: CLINIC | Age: 74
End: 2019-05-30
Payer: MEDICARE

## 2019-05-30 VITALS
HEIGHT: 67 IN | WEIGHT: 260 LBS | HEART RATE: 60 BPM | SYSTOLIC BLOOD PRESSURE: 137 MMHG | TEMPERATURE: 98 F | DIASTOLIC BLOOD PRESSURE: 70 MMHG | BODY MASS INDEX: 40.81 KG/M2

## 2019-05-30 DIAGNOSIS — L60.9 DISEASE OF NAIL: ICD-10-CM

## 2019-05-30 DIAGNOSIS — E11.42 DIABETIC POLYNEUROPATHY ASSOCIATED WITH TYPE 2 DIABETES MELLITUS: Primary | ICD-10-CM

## 2019-05-30 DIAGNOSIS — L84 CORN OR CALLUS: ICD-10-CM

## 2019-05-30 PROCEDURE — 3075F PR MOST RECENT SYSTOLIC BLOOD PRESS GE 130-139MM HG: ICD-10-PCS | Mod: HCNC,CPTII,S$GLB, | Performed by: PODIATRIST

## 2019-05-30 PROCEDURE — 3078F DIAST BP <80 MM HG: CPT | Mod: HCNC,CPTII,S$GLB, | Performed by: PODIATRIST

## 2019-05-30 PROCEDURE — 3078F PR MOST RECENT DIASTOLIC BLOOD PRESSURE < 80 MM HG: ICD-10-PCS | Mod: HCNC,CPTII,S$GLB, | Performed by: PODIATRIST

## 2019-05-30 PROCEDURE — 1101F PR PT FALLS ASSESS DOC 0-1 FALLS W/OUT INJ PAST YR: ICD-10-PCS | Mod: HCNC,CPTII,S$GLB, | Performed by: PODIATRIST

## 2019-05-30 PROCEDURE — 99213 PR OFFICE/OUTPT VISIT, EST, LEVL III, 20-29 MIN: ICD-10-PCS | Mod: HCNC,S$GLB,, | Performed by: PODIATRIST

## 2019-05-30 PROCEDURE — 99999 PR PBB SHADOW E&M-EST. PATIENT-LVL III: CPT | Mod: PBBFAC,HCNC,, | Performed by: PODIATRIST

## 2019-05-30 PROCEDURE — 3075F SYST BP GE 130 - 139MM HG: CPT | Mod: HCNC,CPTII,S$GLB, | Performed by: PODIATRIST

## 2019-05-30 PROCEDURE — 1101F PT FALLS ASSESS-DOCD LE1/YR: CPT | Mod: HCNC,CPTII,S$GLB, | Performed by: PODIATRIST

## 2019-05-30 PROCEDURE — 99999 PR PBB SHADOW E&M-EST. PATIENT-LVL III: ICD-10-PCS | Mod: PBBFAC,HCNC,, | Performed by: PODIATRIST

## 2019-05-30 PROCEDURE — 99213 OFFICE O/P EST LOW 20 MIN: CPT | Mod: HCNC,S$GLB,, | Performed by: PODIATRIST

## 2019-05-30 RX ORDER — SODIUM CHLORIDE 9 MG/ML
INJECTION, SOLUTION INTRAVENOUS
COMMUNITY
Start: 2019-05-23 | End: 2020-03-09

## 2019-05-30 RX ORDER — MEROPENEM 1 G/1
INJECTION, POWDER, FOR SOLUTION INTRAVENOUS
COMMUNITY
Start: 2019-04-09 | End: 2019-06-13

## 2019-05-30 RX ORDER — CEFTRIAXONE 2 G/1
INJECTION, POWDER, FOR SOLUTION INTRAMUSCULAR; INTRAVENOUS
COMMUNITY
Start: 2019-05-17 | End: 2019-06-13

## 2019-06-03 ENCOUNTER — LAB VISIT (OUTPATIENT)
Dept: LAB | Facility: HOSPITAL | Age: 74
End: 2019-06-03
Attending: INTERNAL MEDICINE
Payer: MEDICARE

## 2019-06-03 DIAGNOSIS — L70.9 SAPHO SYNDROME: Primary | ICD-10-CM

## 2019-06-03 DIAGNOSIS — M65.9 SAPHO SYNDROME: Primary | ICD-10-CM

## 2019-06-03 DIAGNOSIS — M85.80 SAPHO SYNDROME: Primary | ICD-10-CM

## 2019-06-03 DIAGNOSIS — M86.9 SAPHO SYNDROME: Primary | ICD-10-CM

## 2019-06-03 DIAGNOSIS — L40.3 SAPHO SYNDROME: Primary | ICD-10-CM

## 2019-06-03 LAB
ALBUMIN SERPL BCP-MCNC: 2.5 G/DL (ref 3.5–5.2)
ALP SERPL-CCNC: 214 U/L (ref 55–135)
ALT SERPL W/O P-5'-P-CCNC: 8 U/L (ref 10–44)
ANION GAP SERPL CALC-SCNC: 7 MMOL/L (ref 8–16)
AST SERPL-CCNC: 17 U/L (ref 10–40)
BASOPHILS # BLD AUTO: 0.01 K/UL (ref 0–0.2)
BASOPHILS NFR BLD: 0.3 % (ref 0–1.9)
BILIRUB SERPL-MCNC: 0.3 MG/DL (ref 0.1–1)
BUN SERPL-MCNC: 11 MG/DL (ref 8–23)
CALCIUM SERPL-MCNC: 8.6 MG/DL (ref 8.7–10.5)
CHLORIDE SERPL-SCNC: 108 MMOL/L (ref 95–110)
CO2 SERPL-SCNC: 25 MMOL/L (ref 23–29)
CREAT SERPL-MCNC: 1.2 MG/DL (ref 0.5–1.4)
CRP SERPL-MCNC: 10.2 MG/L (ref 0–8.2)
DIFFERENTIAL METHOD: ABNORMAL
EOSINOPHIL # BLD AUTO: 0.1 K/UL (ref 0–0.5)
EOSINOPHIL NFR BLD: 3.7 % (ref 0–8)
ERYTHROCYTE [DISTWIDTH] IN BLOOD BY AUTOMATED COUNT: 14.7 % (ref 11.5–14.5)
ERYTHROCYTE [SEDIMENTATION RATE] IN BLOOD BY WESTERGREN METHOD: 70 MM/HR (ref 0–36)
EST. GFR  (AFRICAN AMERICAN): 50.7 ML/MIN/1.73 M^2
EST. GFR  (NON AFRICAN AMERICAN): 44 ML/MIN/1.73 M^2
GLUCOSE SERPL-MCNC: 196 MG/DL (ref 70–110)
HCT VFR BLD AUTO: 34.6 % (ref 37–48.5)
HGB BLD-MCNC: 10.7 G/DL (ref 12–16)
IMM GRANULOCYTES # BLD AUTO: 0.01 K/UL (ref 0–0.04)
IMM GRANULOCYTES NFR BLD AUTO: 0.3 % (ref 0–0.5)
LYMPHOCYTES # BLD AUTO: 1.3 K/UL (ref 1–4.8)
LYMPHOCYTES NFR BLD: 41.4 % (ref 18–48)
MCH RBC QN AUTO: 28.9 PG (ref 27–31)
MCHC RBC AUTO-ENTMCNC: 30.9 G/DL (ref 32–36)
MCV RBC AUTO: 94 FL (ref 82–98)
MONOCYTES # BLD AUTO: 0.3 K/UL (ref 0.3–1)
MONOCYTES NFR BLD: 9.3 % (ref 4–15)
NEUTROPHILS # BLD AUTO: 1.4 K/UL (ref 1.8–7.7)
NEUTROPHILS NFR BLD: 45 % (ref 38–73)
NRBC BLD-RTO: 0 /100 WBC
PLATELET # BLD AUTO: 179 K/UL (ref 150–350)
PMV BLD AUTO: 10.3 FL (ref 9.2–12.9)
POTASSIUM SERPL-SCNC: 4.1 MMOL/L (ref 3.5–5.1)
PROT SERPL-MCNC: 7 G/DL (ref 6–8.4)
RBC # BLD AUTO: 3.7 M/UL (ref 4–5.4)
SODIUM SERPL-SCNC: 140 MMOL/L (ref 136–145)
WBC # BLD AUTO: 3.21 K/UL (ref 3.9–12.7)

## 2019-06-03 PROCEDURE — 85652 RBC SED RATE AUTOMATED: CPT | Mod: HCNC

## 2019-06-03 PROCEDURE — 80053 COMPREHEN METABOLIC PANEL: CPT | Mod: HCNC

## 2019-06-03 PROCEDURE — 85025 COMPLETE CBC W/AUTO DIFF WBC: CPT | Mod: HCNC

## 2019-06-03 PROCEDURE — 86140 C-REACTIVE PROTEIN: CPT | Mod: HCNC

## 2019-06-04 ENCOUNTER — TELEPHONE (OUTPATIENT)
Dept: OPHTHALMOLOGY | Facility: CLINIC | Age: 74
End: 2019-06-04

## 2019-06-04 NOTE — TELEPHONE ENCOUNTER
Called patient regarding diabetic eye screening results no answer.Requires eye exam due to inadequate image quality for one or both eyes.   Follw up in 1 month.      ----- Message from Alberta Cerda MA sent at 6/3/2019  8:09 AM CDT -----      ----- Message -----  From: Christian Hines MD  Sent: 5/29/2019   6:34 PM  To: Shamar Espinal

## 2019-06-10 ENCOUNTER — LAB VISIT (OUTPATIENT)
Dept: LAB | Facility: HOSPITAL | Age: 74
End: 2019-06-10
Attending: INTERNAL MEDICINE
Payer: MEDICARE

## 2019-06-10 DIAGNOSIS — L40.3 SAPHO SYNDROME: ICD-10-CM

## 2019-06-10 DIAGNOSIS — L97.509 DIABETIC FOOT ULCER WITH OSTEOMYELITIS: Primary | ICD-10-CM

## 2019-06-10 DIAGNOSIS — M86.9 DIABETIC FOOT ULCER WITH OSTEOMYELITIS: Primary | ICD-10-CM

## 2019-06-10 DIAGNOSIS — E11.621 DIABETIC FOOT ULCER WITH OSTEOMYELITIS: Primary | ICD-10-CM

## 2019-06-10 DIAGNOSIS — E11.69 DIABETIC FOOT ULCER WITH OSTEOMYELITIS: Primary | ICD-10-CM

## 2019-06-10 DIAGNOSIS — M85.80 SAPHO SYNDROME: ICD-10-CM

## 2019-06-10 DIAGNOSIS — M65.9 SAPHO SYNDROME: ICD-10-CM

## 2019-06-10 DIAGNOSIS — M86.9 SAPHO SYNDROME: ICD-10-CM

## 2019-06-10 DIAGNOSIS — L70.9 SAPHO SYNDROME: ICD-10-CM

## 2019-06-10 LAB
BASOPHILS # BLD AUTO: 0.02 K/UL (ref 0–0.2)
BASOPHILS NFR BLD: 0.7 % (ref 0–1.9)
CRP SERPL-MCNC: 15.1 MG/L (ref 0–8.2)
DIFFERENTIAL METHOD: ABNORMAL
EOSINOPHIL # BLD AUTO: 0.1 K/UL (ref 0–0.5)
EOSINOPHIL NFR BLD: 3.9 % (ref 0–8)
ERYTHROCYTE [DISTWIDTH] IN BLOOD BY AUTOMATED COUNT: 14.8 % (ref 11.5–14.5)
ERYTHROCYTE [SEDIMENTATION RATE] IN BLOOD BY WESTERGREN METHOD: 65 MM/HR (ref 0–36)
HCT VFR BLD AUTO: 34.7 % (ref 37–48.5)
HGB BLD-MCNC: 10.8 G/DL (ref 12–16)
IMM GRANULOCYTES # BLD AUTO: 0 K/UL (ref 0–0.04)
IMM GRANULOCYTES NFR BLD AUTO: 0 % (ref 0–0.5)
LYMPHOCYTES # BLD AUTO: 1.1 K/UL (ref 1–4.8)
LYMPHOCYTES NFR BLD: 37.5 % (ref 18–48)
MCH RBC QN AUTO: 29 PG (ref 27–31)
MCHC RBC AUTO-ENTMCNC: 31.1 G/DL (ref 32–36)
MCV RBC AUTO: 93 FL (ref 82–98)
MONOCYTES # BLD AUTO: 0.3 K/UL (ref 0.3–1)
MONOCYTES NFR BLD: 9.2 % (ref 4–15)
NEUTROPHILS # BLD AUTO: 1.4 K/UL (ref 1.8–7.7)
NEUTROPHILS NFR BLD: 48.7 % (ref 38–73)
NRBC BLD-RTO: 0 /100 WBC
PLATELET # BLD AUTO: 213 K/UL (ref 150–350)
PMV BLD AUTO: 9.8 FL (ref 9.2–12.9)
RBC # BLD AUTO: 3.72 M/UL (ref 4–5.4)
WBC # BLD AUTO: 2.83 K/UL (ref 3.9–12.7)

## 2019-06-10 PROCEDURE — 86140 C-REACTIVE PROTEIN: CPT | Mod: HCNC

## 2019-06-10 PROCEDURE — 85652 RBC SED RATE AUTOMATED: CPT | Mod: HCNC

## 2019-06-10 PROCEDURE — 85025 COMPLETE CBC W/AUTO DIFF WBC: CPT | Mod: HCNC

## 2019-06-11 ENCOUNTER — TELEPHONE (OUTPATIENT)
Dept: OPHTHALMOLOGY | Facility: CLINIC | Age: 74
End: 2019-06-11

## 2019-06-11 LAB
FUNGUS SPEC CULT: NORMAL
FUNGUS SPEC CULT: NORMAL

## 2019-06-11 NOTE — TELEPHONE ENCOUNTER
Called patient regarding diabetic eye screening results no answer.Requires eye exam due to inadequate image quality for one or both eyes.   Follw up in 1 month.    ----- Message from Alberta Cerda MA sent at 6/4/2019 10:03 AM CDT -----      ----- Message -----  From: Christian Hines MD  Sent: 5/29/2019   6:34 PM  To: Shamar Espinal

## 2019-06-12 NOTE — PROGRESS NOTES
Subjective:      Patient ID: Sherin Ortiz is a 74 y.o. female.    Chief Complaint: Foot Ulcer and Diabetes Mellitus (learfrederick 5/29)    Sherin is a 74 y.o. female who presents to the clinic for evaluation and treatment of high risk feet. 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/2013), Benign essential hypertension (11/14/2012), Cataract, Chronic kidney disease (CKD), stage III (moderate) (9/12/2013), Diabetic peripheral neuropathy associated with type 2 diabetes mellitus (11/14/2014), Gait disorder, Hyperlipidemia, Iritis - Both Eyes (6/10/2013), Kidney stone, Lymphedema, Morbid obesity with BMI of 40.0-44.9, adult (2/18/2015), Nephrolithiasis (4/20/2016), NS (nuclear sclerosis) (4/1/2013), Nuclear sclerosis - Both Eyes (4/29/2013), Preseptal cellulitis - Right Eye (4/29/2013), Proliferative diabetic retinopathy - Both Eyes (4/29/2013), Proliferative diabetic retinopathy, both eyes (4/1/2013), PSC (posterior subcapsular cataract) - Both Eyes (4/29/2013), S/P hernia repair (12/19/2012), TIA (transient ischemic attack) (11/18/2014), Tinea pedis (7/24/2012), Type 2 diabetes mellitus with renal manifestations, controlled (12/12/2013), Type 2 diabetes, controlled, with moderate nonproliferative diabetic retinopathy without macular edema (9/17/2015), Ulcer of left lower extremity, limited to breakdown of skin (7/8/2015), Unspecified cerebral artery occlusion with cerebral infarction (11/16/2014), Unspecified venous (peripheral) insufficiency, Ureteral stone with hydronephrosis (1/27/2016), UTI (lower urinary tract infection), Vaginal infection, and 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 The patient's chief complaint is long, thick toenails. This patient has documented high risk feet requiring routine maintenance secondary to diabetes mellitis and those secondary complications of diabetes, as mentioned..    PCP: Ame Vasquez MD    Date Last Seen by PCP:   Chief Complaint    Patient presents with    Foot Ulcer    Diabetes Mellitus     leary 5/29         Current shoe gear:  Affected Foot: Rx diabetic extra depth shoes and custom accommodative insoles     Unaffected Foot: Rx diabetic extra depth shoes and custom accommodative insoles    Hemoglobin A1C   Date Value Ref Range Status   03/22/2019 9.8 (H) 4.0 - 5.6 % Final     Comment:     ADA Screening Guidelines:  5.7-6.4%  Consistent with prediabetes  >or=6.5%  Consistent with diabetes  High levels of fetal hemoglobin interfere with the HbA1C  assay. Heterozygous hemoglobin variants (HbS, HgC, etc)do  not significantly interfere with this assay.   However, presence of multiple variants may affect accuracy.     08/13/2018 10.6 (H) 4.0 - 5.6 % Final     Comment:     ADA Screening Guidelines:  5.7-6.4%  Consistent with prediabetes  >or=6.5%  Consistent with diabetes  High levels of fetal hemoglobin interfere with the HbA1C  assay. Heterozygous hemoglobin variants (HbS, HgC, etc)do  not significantly interfere with this assay.   However, presence of multiple variants may affect accuracy.     10/09/2017 9.9 (H) 4.0 - 5.6 % Final     Comment:     According to ADA guidelines, hemoglobin A1c <7.0% represents  optimal control in non-pregnant diabetic patients. Different  metrics may apply to specific patient populations.   Standards of Medical Care in Diabetes-2016.  For the purpose of screening for the presence of diabetes:  <5.7%     Consistent with the absence of diabetes  5.7-6.4%  Consistent with increasing risk for diabetes   (prediabetes)  >or=6.5%  Consistent with diabetes  Currently, no consensus exists for use of hemoglobin A1c  for diagnosis of diabetes for children.  This Hemoglobin A1c assay has significant interference with fetal   hemoglobin   (HbF). The results are invalid for patients with abnormal amounts of   HbF,   including those with known Hereditary Persistence   of Fetal Hemoglobin. Heterozygous hemoglobin variants  (HbAS, HbAC,   HbAD, HbAE, HbA2) do not significantly interfere with this assay;   however, presence of multiple variants in a sample may impact the %   interference.         Review of Systems   Constitution: Negative for chills and fever.   HENT: Negative for congestion and tinnitus.    Eyes: Negative for double vision and visual disturbance.   Cardiovascular: Negative for chest pain and claudication.   Respiratory: Negative for hemoptysis and shortness of breath.    Endocrine: Negative for cold intolerance and heat intolerance.   Hematologic/Lymphatic: Negative for adenopathy and bleeding problem.   Skin: Positive for color change, dry skin and nail changes.   Musculoskeletal: Positive for stiffness. Negative for myalgias.   Gastrointestinal: Negative for nausea and vomiting.   Genitourinary: Negative for dysuria and hematuria.   Neurological: Positive for numbness and paresthesias.   Psychiatric/Behavioral: Negative for altered mental status and suicidal ideas.   Allergic/Immunologic: Negative for environmental allergies and persistent infections.           Objective:      Physical Exam   Constitutional: She is oriented to person, place, and time. She appears well-developed and well-nourished.   Cardiovascular:   Pulses:       Dorsalis pedis pulses are 1+ on the right side, and 1+ on the left side.        Posterior tibial pulses are 1+ on the right side, and 1+ on the left side.   Pulmonary/Chest: Effort normal.   Musculoskeletal: Normal range of motion.   Anterior, lateral, and posterior muscle groups bilateral lower extremities show strength 4 over 5 symmetrically. Inspection and palpation of the joints and bones reveal no crepitus or joint effusion. No tenderness upon palpation. Mild plantar flexor contractures noted to digits 2 through 5 bilaterally.  Angle and base of gait are normal.   Feet:   Right Foot:   Skin Integrity: Positive for callus and dry skin.   Left Foot:   Skin Integrity: Positive for callus  and dry skin.   Neurological: She is alert and oriented to person, place, and time. She displays atrophy and abnormal reflex. A sensory deficit is present.   Reflex Scores:       Patellar reflexes are 1+ on the right side and 1+ on the left side.       Achilles reflexes are 1+ on the right side and 1+ on the left side.  Sharp, dull, light touch, and vibratory sensation are diminished bilaterally. Proprioceptive sensation is intact to both lower extremities. Verona Philip monofilament exam shows loss of protective sensation to plantar toes 1 through 5 bilaterally. Deep tendon reflexes to the patellar tendons is 1 over 4 bilaterally symmetrical. Deep tendon reflexes to the Achilles tendon is 0 over 4 bilaterally symmetrical. No ankle clonus or Babinski reflex noted bilaterally. Coordination is fair to both lower extremities.  Patient admits to intermittent burning and tingling in the feet.   Skin: Skin is warm and dry. Capillary refill takes 2 to 3 seconds. There is pallor.   Skin bilateral lower extremities noted to be thin, dry, and atrophic.  Toenails thickened, discolored, with subungual fungal debris and tenderness noted.  Hyperkeratotic lesions noted to both feet plantarly with tenderness.   Psychiatric: She has a normal mood and affect.   Vitals reviewed.            Assessment:       Encounter Diagnoses   Name Primary?    Diabetic polyneuropathy associated with type 2 diabetes mellitus Yes    Disease of nail     Corn or callus          Plan:       Sherin was seen today for foot ulcer and diabetes mellitus.    Diagnoses and all orders for this visit:    Diabetic polyneuropathy associated with type 2 diabetes mellitus    Disease of nail    Corn or callus      I counseled the patient on her conditions, their implications and medical management.      Shoe inspection. Diabetic Foot Education. Patient reminded of the importance of good nutrition and blood sugar control to help prevent podiatric complications of  diabetes. Patient instructed on proper foot hygeine. We discussed wearing proper shoe gear, daily foot inspections and Diabetic foot education in detail.    Return to clinic in 3-6 months or sooner if problems arise   .

## 2019-06-13 ENCOUNTER — INFUSION (OUTPATIENT)
Dept: INFECTIOUS DISEASES | Facility: HOSPITAL | Age: 74
End: 2019-06-13
Attending: INTERNAL MEDICINE
Payer: MEDICARE

## 2019-06-13 ENCOUNTER — OFFICE VISIT (OUTPATIENT)
Dept: INFECTIOUS DISEASES | Facility: CLINIC | Age: 74
End: 2019-06-13
Payer: MEDICARE

## 2019-06-13 VITALS
BODY MASS INDEX: 40.72 KG/M2 | SYSTOLIC BLOOD PRESSURE: 149 MMHG | TEMPERATURE: 98 F | DIASTOLIC BLOOD PRESSURE: 78 MMHG | HEART RATE: 76 BPM | HEIGHT: 67 IN

## 2019-06-13 DIAGNOSIS — M86.9 OSTEOMYELITIS OF TOE OF RIGHT FOOT: Primary | ICD-10-CM

## 2019-06-13 PROCEDURE — 99499 UNLISTED E&M SERVICE: CPT | Mod: HCNC,S$GLB,, | Performed by: INTERNAL MEDICINE

## 2019-06-13 PROCEDURE — 1101F PT FALLS ASSESS-DOCD LE1/YR: CPT | Mod: HCNC,CPTII,S$GLB, | Performed by: INTERNAL MEDICINE

## 2019-06-13 PROCEDURE — 3078F DIAST BP <80 MM HG: CPT | Mod: HCNC,CPTII,S$GLB, | Performed by: INTERNAL MEDICINE

## 2019-06-13 PROCEDURE — 99214 PR OFFICE/OUTPT VISIT, EST, LEVL IV, 30-39 MIN: ICD-10-PCS | Mod: HCNC,S$GLB,, | Performed by: INTERNAL MEDICINE

## 2019-06-13 PROCEDURE — 3078F PR MOST RECENT DIASTOLIC BLOOD PRESSURE < 80 MM HG: ICD-10-PCS | Mod: HCNC,CPTII,S$GLB, | Performed by: INTERNAL MEDICINE

## 2019-06-13 PROCEDURE — 3077F PR MOST RECENT SYSTOLIC BLOOD PRESSURE >= 140 MM HG: ICD-10-PCS | Mod: HCNC,CPTII,S$GLB, | Performed by: INTERNAL MEDICINE

## 2019-06-13 PROCEDURE — 3077F SYST BP >= 140 MM HG: CPT | Mod: HCNC,CPTII,S$GLB, | Performed by: INTERNAL MEDICINE

## 2019-06-13 PROCEDURE — 99214 OFFICE O/P EST MOD 30 MIN: CPT | Mod: HCNC,S$GLB,, | Performed by: INTERNAL MEDICINE

## 2019-06-13 PROCEDURE — 1101F PR PT FALLS ASSESS DOC 0-1 FALLS W/OUT INJ PAST YR: ICD-10-PCS | Mod: HCNC,CPTII,S$GLB, | Performed by: INTERNAL MEDICINE

## 2019-06-13 PROCEDURE — 99499 RISK ADDL DX/OHS AUDIT: ICD-10-PCS | Mod: HCNC,S$GLB,, | Performed by: INTERNAL MEDICINE

## 2019-06-13 PROCEDURE — 99999 PR PBB SHADOW E&M-EST. PATIENT-LVL III: CPT | Mod: PBBFAC,HCNC,, | Performed by: INTERNAL MEDICINE

## 2019-06-13 PROCEDURE — 99999 PR PBB SHADOW E&M-EST. PATIENT-LVL III: ICD-10-PCS | Mod: PBBFAC,HCNC,, | Performed by: INTERNAL MEDICINE

## 2019-06-13 RX ORDER — KETOCONAZOLE 20 MG/G
CREAM TOPICAL
Refills: 1 | Status: ON HOLD | COMMUNITY
Start: 2019-06-03 | End: 2020-12-10 | Stop reason: HOSPADM

## 2019-06-13 RX ORDER — CEFADROXIL 1000 MG/1
1 TABLET ORAL 2 TIMES DAILY
Qty: 20 TABLET | Refills: 0 | Status: SHIPPED | OUTPATIENT
Start: 2019-06-13 | End: 2019-06-23

## 2019-06-13 RX ORDER — CEFTRIAXONE SODIUM 10 G/100ML
INJECTION, POWDER, FOR SOLUTION INTRAVENOUS
COMMUNITY
Start: 2019-06-06 | End: 2019-06-13

## 2019-06-13 NOTE — PROGRESS NOTES
PICC line removed from right upper arm. PICC catheter tip visualized and intact. Pressure dressing applied with vaseline occlusive dressing , 2x2 gauze and secured with coban.  No redness, ecchymosis, edema, swelling, or drainage noted at site. Pt. instructed to leave dressing in place for 24 hours and replace with band aid after 24 hours. Pt. also instructed not to immerse in water for three days.  Patient verbalized understanding.  Observed patient for 30 minutes with no signs of bleeding to right upper arm.  Patient left in NAD.

## 2019-06-13 NOTE — PROGRESS NOTES
Subjective:      Patient ID: Sherin Ortiz is a 74 y.o. female.    Chief Complaint:Follow-up      History of Present Illness    74 yro F with PMH of IDDM2 (AIC 9.8), HTN, chronic LLE ulcers (admitted [facility] 3/22 for cellulitis, completed vanc and meropenem course 4/12), and CKD3 who was admitted to [facility] on 5/9/19 with left hand swelling. She was found to have osteomyelitis of the right foot great toe.  This was cultures with cultures positive for group C strep and pan sensitive proteus.  She was discharged on IV cefazolin.  She developed acute kidney injuray and this was discontinued for about 2 days.  She was then started on IV ceftriaxone.  She has been on this since.    She is here today for follow up.  Her wounds are progressig well.  Home health changes dressings twice a week (mon, thur).  Denies diarrhea.  Denies any nausea.  States that her blood sugars have been fine.      Review of Systems   Constitution: Negative for chills, decreased appetite, fever, malaise/fatigue, night sweats, weight gain and weight loss.   HENT: Negative for congestion, ear pain, hearing loss, hoarse voice, sore throat and tinnitus.    Eyes: Negative for blurred vision, redness and visual disturbance.   Cardiovascular: Positive for leg swelling. Negative for chest pain and palpitations.   Respiratory: Negative for cough, hemoptysis, shortness of breath and sputum production.    Hematologic/Lymphatic: Negative for adenopathy. Does not bruise/bleed easily.   Skin: Positive for dry skin and itching. Negative for rash and suspicious lesions.   Musculoskeletal: Positive for back pain, joint pain and neck pain. Negative for myalgias.   Gastrointestinal: Negative for abdominal pain, constipation, diarrhea, heartburn, nausea and vomiting.   Genitourinary: Positive for urgency. Negative for dysuria, flank pain, frequency, hematuria and hesitancy.   Neurological: Positive for paresthesias. Negative for dizziness, headaches, numbness and  weakness.   Psychiatric/Behavioral: Negative for depression and memory loss. The patient does not have insomnia and is not nervous/anxious.      Objective:   Physical Exam   Constitutional: She is oriented to person, place, and time. She appears well-developed and well-nourished. No distress.   HENT:   Head: Normocephalic and atraumatic.   Right Ear: External ear normal.   Left Ear: External ear normal.   Nose: Nose normal.   Mouth/Throat: Oropharynx is clear and moist. No oropharyngeal exudate.   Eyes: Pupils are equal, round, and reactive to light. Conjunctivae and EOM are normal. Right eye exhibits no discharge. Left eye exhibits no discharge. No scleral icterus.   Neck: Normal range of motion. Neck supple. No JVD present. No tracheal deviation present. No thyromegaly present.   Cardiovascular: Normal rate, regular rhythm, normal heart sounds and intact distal pulses. Exam reveals no gallop and no friction rub.   No murmur heard.  Pulmonary/Chest: Effort normal and breath sounds normal. No stridor. No respiratory distress. She has no wheezes. She has no rales. She exhibits no tenderness.   Abdominal: Soft. Bowel sounds are normal. She exhibits no distension and no mass. There is no tenderness. There is no rebound and no guarding.   Musculoskeletal: Normal range of motion. She exhibits no edema or tenderness.   Lymphadenopathy:     She has no cervical adenopathy.   Neurological: She is alert and oriented to person, place, and time. She displays normal reflexes. No cranial nerve deficit. She exhibits normal muscle tone. Coordination normal.   Skin: Skin is warm. No rash noted. She is not diaphoretic. No erythema. No pallor.   Bilateral LE are wrapped tightly in dressings.   Psychiatric: She has a normal mood and affect. Her behavior is normal. Judgment and thought content normal.   Nursing note and vitals reviewed.       Assessment:       1. Osteomyelitis of toe of right foot        77 y/o with a history of DM, CKD  and osteomyelitis of the right foot.  Cultures were positive for group C strep and proteus.  She has completed about 5 weeks of IV antibiotics.  I will remove the picc line and place her on an additional 10 days of oral cefadroxil.  Plan:       Osteomyelitis of toe of right foot  -     Nursing communication; Future; Expected date: 06/13/2019  -     cefadroxil (DURICEF) 1 gram tablet; Take 1 tablet (1 g total) by mouth 2 (two) times daily. for 10 days  Dispense: 20 tablet; Refill: 0

## 2019-06-14 ENCOUNTER — EXTERNAL HOME HEALTH (OUTPATIENT)
Dept: HOME HEALTH SERVICES | Facility: HOSPITAL | Age: 74
End: 2019-06-14
Payer: MEDICARE

## 2019-06-18 ENCOUNTER — TELEPHONE (OUTPATIENT)
Dept: INTERNAL MEDICINE | Facility: CLINIC | Age: 74
End: 2019-06-18

## 2019-06-18 ENCOUNTER — TELEPHONE (OUTPATIENT)
Dept: WOUND CARE | Facility: CLINIC | Age: 74
End: 2019-06-18

## 2019-06-18 NOTE — TELEPHONE ENCOUNTER
----- Message from Win Luevano sent at 6/18/2019 12:34 PM CDT -----  Contact: Lizbet Adams Round [phone] Ref# [id number]  Power Chair update....    Calling to check the status of the power chair, would like a response back from office. Faxed on May 31st. Lizbet Adams Round [phone] Ref# [id number]    Please call an advise  Thank you

## 2019-06-18 NOTE — TELEPHONE ENCOUNTER
nurse, Abner, advised patient has left great toe blister - needs wound care orders for treatment of care- nurse will pad blister until patient's follow up visit tomorrow 6/19/19 at 2pm.  Will forward message to Yumiko

## 2019-06-19 ENCOUNTER — OFFICE VISIT (OUTPATIENT)
Dept: WOUND CARE | Facility: CLINIC | Age: 74
End: 2019-06-19
Payer: MEDICARE

## 2019-06-19 ENCOUNTER — TELEPHONE (OUTPATIENT)
Dept: OPHTHALMOLOGY | Facility: CLINIC | Age: 74
End: 2019-06-19

## 2019-06-19 VITALS
WEIGHT: 270 LBS | TEMPERATURE: 97 F | HEART RATE: 88 BPM | SYSTOLIC BLOOD PRESSURE: 141 MMHG | DIASTOLIC BLOOD PRESSURE: 74 MMHG | HEIGHT: 67 IN | BODY MASS INDEX: 42.38 KG/M2

## 2019-06-19 DIAGNOSIS — L97.922 ULCER OF LOWER LIMB, LEFT, WITH FAT LAYER EXPOSED: ICD-10-CM

## 2019-06-19 DIAGNOSIS — L97.512 SKIN ULCER OF TOE OF RIGHT FOOT WITH FAT LAYER EXPOSED: Primary | ICD-10-CM

## 2019-06-19 DIAGNOSIS — B35.3 TINEA PEDIS OF BOTH FEET: Chronic | ICD-10-CM

## 2019-06-19 DIAGNOSIS — I87.2 VENOUS INSUFFICIENCY OF BOTH LOWER EXTREMITIES: Chronic | ICD-10-CM

## 2019-06-19 DIAGNOSIS — L97.412 ULCER OF RIGHT HEEL AND MIDFOOT WITH FAT LAYER EXPOSED: ICD-10-CM

## 2019-06-19 DIAGNOSIS — L97.512 ULCER OF TOE, RIGHT, WITH FAT LAYER EXPOSED: ICD-10-CM

## 2019-06-19 DIAGNOSIS — R60.0 BILATERAL LEG EDEMA: ICD-10-CM

## 2019-06-19 DIAGNOSIS — L97.911 ULCER OF RIGHT LOWER EXTREMITY, LIMITED TO BREAKDOWN OF SKIN: ICD-10-CM

## 2019-06-19 PROCEDURE — 99499 NO LOS: ICD-10-PCS | Mod: HCNC,S$GLB,, | Performed by: NURSE PRACTITIONER

## 2019-06-19 PROCEDURE — 29580 STRAPPING UNNA BOOT: CPT | Mod: 50,HCNC,S$GLB, | Performed by: NURSE PRACTITIONER

## 2019-06-19 PROCEDURE — 99499 UNLISTED E&M SERVICE: CPT | Mod: HCNC,S$GLB,, | Performed by: NURSE PRACTITIONER

## 2019-06-19 PROCEDURE — 29580 PR STRAPPING UNNA BOOT: ICD-10-PCS | Mod: 50,HCNC,S$GLB, | Performed by: NURSE PRACTITIONER

## 2019-06-19 PROCEDURE — 99999 PR PBB SHADOW E&M-EST. PATIENT-LVL IV: ICD-10-PCS | Mod: PBBFAC,HCNC,, | Performed by: NURSE PRACTITIONER

## 2019-06-19 PROCEDURE — 99999 PR PBB SHADOW E&M-EST. PATIENT-LVL IV: CPT | Mod: PBBFAC,HCNC,, | Performed by: NURSE PRACTITIONER

## 2019-06-19 NOTE — TELEPHONE ENCOUNTER
Called patient regarding diabetic eye screening results no answer.Requires eye exam due to inadequate image quality for one or both eyes.   Follw up in 1 month. Sending patient a letter    ----- Message from Alberta Cerda MA sent at 6/4/2019 10:03 AM CDT -----      ----- Message -----  From: Christian Hines MD  Sent: 5/29/2019   6:34 PM  To: Shamar Espinal

## 2019-06-19 NOTE — PROGRESS NOTES
Subjective:       Patient ID: Sherin Ortiz is a 74 y.o. female.    Chief Complaint: Wound Check    Wound Check     Wound Check:   This patient is seen today for reevaluation of recurrent ulcers to both feet and lower legs.  Unna boots are on both lower legs and the wounds are healing as evidenced by wound contracture.  However she has a new wound to the left great toe.  She has home health services through [facility]. Problems that interfere with wound healing include multiple co-morbidities, diabetes, edema, diabetic neuropathy and  morbid obesity. Because of safety issues we are unable to weigh the patient as she is unstable on her feet.  The patient is afebrile. The patient ambulates with great difficulty secondary to her body habitus and uses a motorized wheelchair which we cannot get on the scale to weigh her.  She does not complain of pain.  She denies increased swelling, redness or purulent drainage.   She is afebrile.  Review of Systems    Unchanged from previous visit.  Objective:      Physical Exam   Constitutional: She is oriented to person, place, and time. No distress.   Morbidly obese   HENT:   Head: Normocephalic and atraumatic.   Neck: Normal range of motion. Neck supple.   Pulmonary/Chest: Effort normal.   Musculoskeletal: Normal range of motion. She exhibits edema. She exhibits no tenderness.        Legs:       Feet:    Neurological: She is alert and oriented to person, place, and time.   Skin: Skin is warm and dry. Rash (dermatitis and tinea bilateral lower extremities) noted. She is not diaphoretic. No cyanosis or erythema. No pallor. Nails show no clubbing.   Psychiatric: She has a normal mood and affect. Her behavior is normal. Judgment and thought content normal.   Nursing note and vitals reviewed.    ..  Hemoglobin A1C   Date Value Ref Range Status   03/22/2019 9.8 (H) 4.0 - 5.6 % Final     Comment:     ADA Screening Guidelines:  5.7-6.4%  Consistent with prediabetes  >or=6.5%  Consistent  with diabetes  High levels of fetal hemoglobin interfere with the HbA1C  assay. Heterozygous hemoglobin variants (HbS, HgC, etc)do  not significantly interfere with this assay.   However, presence of multiple variants may affect accuracy.     08/13/2018 10.6 (H) 4.0 - 5.6 % Final     Comment:     ADA Screening Guidelines:  5.7-6.4%  Consistent with prediabetes  >or=6.5%  Consistent with diabetes  High levels of fetal hemoglobin interfere with the HbA1C  assay. Heterozygous hemoglobin variants (HbS, HgC, etc)do  not significantly interfere with this assay.   However, presence of multiple variants may affect accuracy.     10/09/2017 9.9 (H) 4.0 - 5.6 % Final     Comment:     According to ADA guidelines, hemoglobin A1c <7.0% represents  optimal control in non-pregnant diabetic patients. Different  metrics may apply to specific patient populations.   Standards of Medical Care in Diabetes-2016.  For the purpose of screening for the presence of diabetes:  <5.7%     Consistent with the absence of diabetes  5.7-6.4%  Consistent with increasing risk for diabetes   (prediabetes)  >or=6.5%  Consistent with diabetes  Currently, no consensus exists for use of hemoglobin A1c  for diagnosis of diabetes for children.  This Hemoglobin A1c assay has significant interference with fetal   hemoglobin   (HbF). The results are invalid for patients with abnormal amounts of   HbF,   including those with known Hereditary Persistence   of Fetal Hemoglobin. Heterozygous hemoglobin variants (HbAS, HbAC,   HbAD, HbAE, HbA2) do not significantly interfere with this assay;   however, presence of multiple variants in a sample may impact the %   interference.       ..  Lab Results   Component Value Date    ALBUMIN 2.5 (L) 06/03/2019     Sherin was seen in the clinic room and placed in the supine position on the treatment table.  The legs were cleansed with Easi-clense sponges and dried thoroughly.  A mepilex lite foam dressing was applied to the right  and left calf and shin wounds.  Calmoseptine to the periwound area right heel and cover with a hydrofiber dressing and a double foam dressing. Cotton was placed between the toes. Eucerin cream was applied to the lower legs.  The patient's feet were positioned at a 90 degree angle.  A zinc oxide wrap, followed by kerlix roll gauze and coban were applied using a spiral technique avoiding creases or folds.  The wraps were started behind the first metatarsal and ended below the tibial tubercle of the knee.  There was overlap of each turn half the width of the previous turn.  The compression wraps will be changed every 3-4 days.    Assessment:       1. Skin ulcer of toe of right foot with fat layer exposed    2. Ulcer of right lower extremity, limited to breakdown of skin    3. Ulcer of lower limb, left, with fat layer exposed    4. Ulcer of right heel and midfoot with fat layer exposed    5. Ulcer of toe, right, with fat layer exposed    6. Venous insufficiency of both lower extremities    7. Tinea pedis of both feet    8. Bilateral leg edema        Plan:           Unna boots bilateral lower legs as detailed above.  Patient was warned not to get the dressings wet and to use cast covers for showering.  Should the dressing become wet, she is to remove it, place a wet-to-dry dressing over the wound, cover with gauze and roll gauze and use ace wraps for compression and to secure bandages.  She should then notify home health as soon as possible to have a new dressing applied.  Return to clinic in 4 weeks.  [facility] notified of orders via email. We will ask them to continue to see the patient two times weekly.    Home Health Orders:    Triamcinolone cream to bilateral lower legs.  Ketoconazole cream to both feet.  Cleanse wounds with wound cleanser.  Apply mepilex lite foam dressing to right and left shin and calf ulcers.  Apply calmoseptine to periwound area right heel and cover with hydrofiber and double foam  dressing.  Place cotton in between toes.  Unna boots bilateral lower legs (zinc oxide, kerlix and coban).  Skilled nurse visit-2 x weekly    Right shin    Right great toe healed    Right medial heel    Left calf    Left shin    Left great toe

## 2019-06-20 ENCOUNTER — TELEPHONE (OUTPATIENT)
Dept: INTERNAL MEDICINE | Facility: CLINIC | Age: 74
End: 2019-06-20

## 2019-06-20 NOTE — TELEPHONE ENCOUNTER
----- Message from Win Luevano sent at 6/20/2019  1:15 PM CDT -----  Contact: Patient [phone]  Hover round chair additional information......    Patient calling stating Hover round wheel chair, is still needing request as to why patient is needing the wheel chair, the current power wheel chair is not working. Was requested over a week ago.    Please call an advise  Thank you

## 2019-06-25 ENCOUNTER — TELEPHONE (OUTPATIENT)
Dept: INTERNAL MEDICINE | Facility: CLINIC | Age: 74
End: 2019-06-25

## 2019-06-25 NOTE — TELEPHONE ENCOUNTER
----- Message from Brea Salgado sent at 6/25/2019  2:55 PM CDT -----  Contact: Cristi/[phone]  Cristi is calling to get a order with diagnostic code for patient's power wheel doctor.  Patient acct. Is [id number].    Please advise, thank you.

## 2019-07-03 ENCOUNTER — TELEPHONE (OUTPATIENT)
Dept: INTERNAL MEDICINE | Facility: CLINIC | Age: 74
End: 2019-07-03

## 2019-07-03 NOTE — TELEPHONE ENCOUNTER
Please see message and advise     ----- Message from Rhea Stewart sent at 7/3/2019  1:58 PM CDT -----  Contact: Cristi Wong / [phone]    Cristi is calling to get a order with diagnostic code for patient's power wheel doctor.  Patient acct. Is [id number].     Please advise, thank you.

## 2019-07-03 NOTE — TELEPHONE ENCOUNTER
----- Message from Bhavana Gates sent at 7/3/2019  1:49 PM CDT -----  Contact: slef;/[phone]  Pt called in regard to talking to someone about her wheel chair,.     Please advise

## 2019-07-03 NOTE — TELEPHONE ENCOUNTER
Spoke with pt in regards to getting a new prescription for a new wheel chair. Advised pt that we did receive the email in regards to the wheel chair and the information was sent to her provider t fill out so that we may send it back to process getting the wheel chair

## 2019-07-05 ENCOUNTER — TELEPHONE (OUTPATIENT)
Dept: INTERNAL MEDICINE | Facility: CLINIC | Age: 74
End: 2019-07-05

## 2019-07-05 DIAGNOSIS — I89.0 LYMPHEDEMA: ICD-10-CM

## 2019-07-11 LAB
ACID FAST MOD KINY STN SPEC: NORMAL
ACID FAST MOD KINY STN SPEC: NORMAL
MYCOBACTERIUM SPEC QL CULT: NORMAL
MYCOBACTERIUM SPEC QL CULT: NORMAL

## 2019-07-16 ENCOUNTER — TELEPHONE (OUTPATIENT)
Dept: WOUND CARE | Facility: CLINIC | Age: 74
End: 2019-07-16

## 2019-07-16 ENCOUNTER — TELEPHONE (OUTPATIENT)
Dept: INTERNAL MEDICINE | Facility: CLINIC | Age: 74
End: 2019-07-16

## 2019-07-16 NOTE — TELEPHONE ENCOUNTER
----- Message from Aleja Velasco sent at 7/16/2019  1:17 PM CDT -----  Contact: Dinaa/ Maribel/ [phone]   Medical director would like a peer to peer, please fax additional clinical info to .

## 2019-07-16 NOTE — TELEPHONE ENCOUNTER
----- Message from Renata Law sent at 7/16/2019  8:45 AM CDT -----  Contact: Abner FLYNN  Needs Advice    Reason for call: Caller needs wound care orders for the patient's left great toe as it has opened up        Communication Preference: [phone]    Additional Information: please contact the caller

## 2019-07-17 ENCOUNTER — TELEPHONE (OUTPATIENT)
Dept: WOUND CARE | Facility: CLINIC | Age: 74
End: 2019-07-17

## 2019-07-17 RX ORDER — PEN NEEDLE, DIABETIC 29 G X1/2"
NEEDLE, DISPOSABLE MISCELLANEOUS
Qty: 90 EACH | Refills: 3 | Status: ON HOLD | OUTPATIENT
Start: 2019-07-17 | End: 2022-07-22 | Stop reason: HOSPADM

## 2019-07-18 NOTE — TELEPHONE ENCOUNTER
I spoke to the patient and told her that her insurance stated that she could have a HoverRoiund ever 5 years and the last one was in 2015. If the patient can prove it was before this it will be reconsidered. GML

## 2019-07-19 ENCOUNTER — OFFICE VISIT (OUTPATIENT)
Dept: WOUND CARE | Facility: CLINIC | Age: 74
End: 2019-07-19
Payer: MEDICARE

## 2019-07-19 VITALS
DIASTOLIC BLOOD PRESSURE: 75 MMHG | HEART RATE: 61 BPM | TEMPERATURE: 98 F | BODY MASS INDEX: 40.81 KG/M2 | SYSTOLIC BLOOD PRESSURE: 156 MMHG | HEIGHT: 67 IN | WEIGHT: 260 LBS

## 2019-07-19 DIAGNOSIS — I87.2 VENOUS INSUFFICIENCY OF BOTH LOWER EXTREMITIES: Chronic | ICD-10-CM

## 2019-07-19 DIAGNOSIS — I87.2 VENOUS STASIS DERMATITIS OF BOTH LOWER EXTREMITIES: Chronic | ICD-10-CM

## 2019-07-19 DIAGNOSIS — I89.0 LYMPHEDEMA: ICD-10-CM

## 2019-07-19 DIAGNOSIS — L97.412 ULCER OF RIGHT HEEL AND MIDFOOT WITH FAT LAYER EXPOSED: Primary | ICD-10-CM

## 2019-07-19 DIAGNOSIS — L97.512 ULCER OF TOE, RIGHT, WITH FAT LAYER EXPOSED: ICD-10-CM

## 2019-07-19 DIAGNOSIS — B35.3 TINEA PEDIS OF BOTH FEET: Chronic | ICD-10-CM

## 2019-07-19 PROBLEM — M86.9 OSTEOMYELITIS: Status: RESOLVED | Noted: 2019-05-11 | Resolved: 2019-07-19

## 2019-07-19 PROBLEM — M86.9 OSTEOMYELITIS OF TOE: Status: RESOLVED | Noted: 2019-05-11 | Resolved: 2019-07-19

## 2019-07-19 PROBLEM — L97.922: Status: RESOLVED | Noted: 2019-01-23 | Resolved: 2019-07-19

## 2019-07-19 PROBLEM — L03.90 CELLULITIS: Status: RESOLVED | Noted: 2019-03-22 | Resolved: 2019-07-19

## 2019-07-19 PROBLEM — L97.901 ULCER OF LOWER EXTREMITY, LIMITED TO BREAKDOWN OF SKIN: Status: RESOLVED | Noted: 2018-05-10 | Resolved: 2019-07-19

## 2019-07-19 PROCEDURE — 99999 PR PBB SHADOW E&M-EST. PATIENT-LVL IV: ICD-10-PCS | Mod: PBBFAC,HCNC,, | Performed by: NURSE PRACTITIONER

## 2019-07-19 PROCEDURE — 99499 NO LOS: ICD-10-PCS | Mod: HCNC,S$GLB,, | Performed by: NURSE PRACTITIONER

## 2019-07-19 PROCEDURE — 99999 PR PBB SHADOW E&M-EST. PATIENT-LVL IV: CPT | Mod: PBBFAC,HCNC,, | Performed by: NURSE PRACTITIONER

## 2019-07-19 PROCEDURE — 29580 STRAPPING UNNA BOOT: CPT | Mod: HCNC,RT,S$GLB, | Performed by: NURSE PRACTITIONER

## 2019-07-19 PROCEDURE — 29580 PR STRAPPING UNNA BOOT: ICD-10-PCS | Mod: HCNC,RT,S$GLB, | Performed by: NURSE PRACTITIONER

## 2019-07-19 PROCEDURE — 99499 UNLISTED E&M SERVICE: CPT | Mod: HCNC,S$GLB,, | Performed by: NURSE PRACTITIONER

## 2019-07-19 NOTE — PROGRESS NOTES
Subjective:       Patient ID: Sherin Ortiz is a 74 y.o. female.    Chief Complaint: Wound Check    Wound Check     Wound Check:   This patient is seen today for reevaluation of recurrent ulcers to both feet and lower legs.  Unna boots are on both lower legs. The leg wounds are healed and the right heel and left great toe wounds are healing as evidenced by wound contracture.  She has home health services through [facility]. Problems that interfere with wound healing include multiple co-morbidities, diabetes, edema, diabetic neuropathy and  morbid obesity. Because of safety issues we are unable to weigh the patient as she is unstable on her feet.  The patient is afebrile. The patient ambulates with great difficulty secondary to her body habitus and uses a motorized wheelchair which we cannot get on the scale to weigh her.  She does not complain of pain.  She denies increased swelling, redness or purulent drainage.   She is afebrile.  Review of Systems    Unchanged from previous visit.  Objective:      Physical Exam   Constitutional: She is oriented to person, place, and time. No distress.   Morbidly obese   HENT:   Head: Normocephalic and atraumatic.   Neck: Normal range of motion. Neck supple.   Pulmonary/Chest: Effort normal.   Musculoskeletal: Normal range of motion. She exhibits edema. She exhibits no tenderness.        Legs:       Feet:    Neurological: She is alert and oriented to person, place, and time.   Skin: Skin is warm and dry. Rash (dermatitis and tinea bilateral lower extremities) noted. She is not diaphoretic. No cyanosis or erythema. No pallor. Nails show no clubbing.   Psychiatric: She has a normal mood and affect. Her behavior is normal. Judgment and thought content normal.   Nursing note and vitals reviewed.    ..  Hemoglobin A1C   Date Value Ref Range Status   03/22/2019 9.8 (H) 4.0 - 5.6 % Final     Comment:     ADA Screening Guidelines:  5.7-6.4%  Consistent with prediabetes  >or=6.5%   Consistent with diabetes  High levels of fetal hemoglobin interfere with the HbA1C  assay. Heterozygous hemoglobin variants (HbS, HgC, etc)do  not significantly interfere with this assay.   However, presence of multiple variants may affect accuracy.     08/13/2018 10.6 (H) 4.0 - 5.6 % Final     Comment:     ADA Screening Guidelines:  5.7-6.4%  Consistent with prediabetes  >or=6.5%  Consistent with diabetes  High levels of fetal hemoglobin interfere with the HbA1C  assay. Heterozygous hemoglobin variants (HbS, HgC, etc)do  not significantly interfere with this assay.   However, presence of multiple variants may affect accuracy.     10/09/2017 9.9 (H) 4.0 - 5.6 % Final     Comment:     According to ADA guidelines, hemoglobin A1c <7.0% represents  optimal control in non-pregnant diabetic patients. Different  metrics may apply to specific patient populations.   Standards of Medical Care in Diabetes-2016.  For the purpose of screening for the presence of diabetes:  <5.7%     Consistent with the absence of diabetes  5.7-6.4%  Consistent with increasing risk for diabetes   (prediabetes)  >or=6.5%  Consistent with diabetes  Currently, no consensus exists for use of hemoglobin A1c  for diagnosis of diabetes for children.  This Hemoglobin A1c assay has significant interference with fetal   hemoglobin   (HbF). The results are invalid for patients with abnormal amounts of   HbF,   including those with known Hereditary Persistence   of Fetal Hemoglobin. Heterozygous hemoglobin variants (HbAS, HbAC,   HbAD, HbAE, HbA2) do not significantly interfere with this assay;   however, presence of multiple variants in a sample may impact the %   interference.       ..  Lab Results   Component Value Date    ALBUMIN 2.5 (L) 06/03/2019     Sherin was seen in the clinic room and placed in the supine position on the treatment table.  The legs were cleansed with Easi-clense sponges and dried thoroughly.  A mepilex lite foam dressing was applied  to the right and left calf and shin wounds.  Calmoseptine to the periwound area right heel and cover with a hydrofiber dressing and a double foam dressing. Cotton was placed between the toes. Eucerin cream was applied to the lower legs.  The patient's feet were positioned at a 90 degree angle.  A zinc oxide wrap, followed by kerlix roll gauze and coban were applied using a spiral technique avoiding creases or folds.  The wraps were started behind the first metatarsal and ended below the tibial tubercle of the knee.  There was overlap of each turn half the width of the previous turn.  The compression wraps will be changed every 3-4 days.    Assessment:       1. Ulcer of right heel and midfoot with fat layer exposed    2. Ulcer of toe, right, with fat layer exposed    3. Venous insufficiency of both lower extremities    4. Tinea pedis of both feet    5. Venous stasis dermatitis of both lower extremities    6. Lymphedema        Plan:           Unna boot right lower leg as detailed above.  Cotton in between toes right foot, cover with cotton gauze and secure with roll gauze.  Kerlix and coban left lower leg for compression.  Aquacel and foam left great toe with cotton in between the toes, gauze and roll gauze.  Kerlix and coban left lateral leg with cotton in between the toes. Cover toes with gauze and secure with roll gauze.  Patient was warned not to get the dressings wet and to use cast covers for showering.  Should the dressing become wet, she is to remove it, place a wet-to-dry dressing over the wound, cover with gauze and roll gauze and use ace wraps for compression and to secure bandages.  She should then notify home health as soon as possible to have a new dressing applied.  Return to clinic in 4 weeks.  [facility] notified of orders via email. We will ask them to continue to see the patient two times weekly.    Home Health Orders:    Triamcinolone cream to bilateral lower legs.  Ketoconazole cream to both  feet.  Cleanse wounds with wound cleanser.  Apply hydrofiber to right heel and left great toe ulcers and cover with mepilex foam.  Unna boot (zinc oxide, kerlix and coban) right lower leg.  Kerlix and coban left lower leg.  Place cotton in between the toes.  Cover toes with gauze and secure with roll gauze.  Skilled nurse visit-2 x weekly    Right medial heel        Left great toe

## 2019-07-24 ENCOUNTER — TELEPHONE (OUTPATIENT)
Dept: INTERNAL MEDICINE | Facility: CLINIC | Age: 74
End: 2019-07-24

## 2019-07-24 NOTE — TELEPHONE ENCOUNTER
----- Message from David Shabazz sent at 7/24/2019  1:14 PM CDT -----  Contact: Cristi Wong/ [facility] [phone]  She is requesting a call back once you have received the fax she sent yesterday for a repair to the patient's hover round. Fax # is [phone].    Ref # [id number] when calling back.    Thank you

## 2019-07-25 ENCOUNTER — TELEPHONE (OUTPATIENT)
Dept: INTERNAL MEDICINE | Facility: CLINIC | Age: 74
End: 2019-07-25

## 2019-07-25 NOTE — TELEPHONE ENCOUNTER
----- Message from Inés Anderson sent at 7/25/2019 10:28 AM CDT -----  Contact: Patient [phone]  Patient is calling for the status of form to Hover round.  They are waiting on Dr. Vasquez's signature.    Please call and advise  Thank you

## 2019-07-27 PROCEDURE — G0179 MD RECERTIFICATION HHA PT: HCPCS | Mod: ,,, | Performed by: INTERNAL MEDICINE

## 2019-07-27 PROCEDURE — G0179 PR HOME HEALTH MD RECERTIFICATION: ICD-10-PCS | Mod: ,,, | Performed by: INTERNAL MEDICINE

## 2019-08-16 ENCOUNTER — OFFICE VISIT (OUTPATIENT)
Dept: WOUND CARE | Facility: CLINIC | Age: 74
End: 2019-08-16
Payer: MEDICARE

## 2019-08-16 VITALS
SYSTOLIC BLOOD PRESSURE: 163 MMHG | HEIGHT: 67 IN | HEART RATE: 67 BPM | TEMPERATURE: 98 F | WEIGHT: 260 LBS | BODY MASS INDEX: 40.81 KG/M2 | DIASTOLIC BLOOD PRESSURE: 81 MMHG

## 2019-08-16 DIAGNOSIS — L97.412 ULCER OF RIGHT HEEL AND MIDFOOT WITH FAT LAYER EXPOSED: ICD-10-CM

## 2019-08-16 DIAGNOSIS — I87.2 VENOUS STASIS DERMATITIS OF BOTH LOWER EXTREMITIES: Chronic | ICD-10-CM

## 2019-08-16 DIAGNOSIS — B35.3 TINEA PEDIS OF BOTH FEET: Chronic | ICD-10-CM

## 2019-08-16 DIAGNOSIS — I89.0 LYMPHEDEMA: ICD-10-CM

## 2019-08-16 DIAGNOSIS — L97.512 ULCER OF TOE, RIGHT, WITH FAT LAYER EXPOSED: Primary | ICD-10-CM

## 2019-08-16 PROCEDURE — 99999 PR PBB SHADOW E&M-EST. PATIENT-LVL V: CPT | Mod: PBBFAC,HCNC,, | Performed by: NURSE PRACTITIONER

## 2019-08-16 PROCEDURE — 3079F DIAST BP 80-89 MM HG: CPT | Mod: HCNC,CPTII,S$GLB, | Performed by: NURSE PRACTITIONER

## 2019-08-16 PROCEDURE — 3077F PR MOST RECENT SYSTOLIC BLOOD PRESSURE >= 140 MM HG: ICD-10-PCS | Mod: HCNC,CPTII,S$GLB, | Performed by: NURSE PRACTITIONER

## 2019-08-16 PROCEDURE — 99212 PR OFFICE/OUTPT VISIT, EST, LEVL II, 10-19 MIN: ICD-10-PCS | Mod: HCNC,S$GLB,, | Performed by: NURSE PRACTITIONER

## 2019-08-16 PROCEDURE — 99999 PR PBB SHADOW E&M-EST. PATIENT-LVL V: ICD-10-PCS | Mod: PBBFAC,HCNC,, | Performed by: NURSE PRACTITIONER

## 2019-08-16 PROCEDURE — 99212 OFFICE O/P EST SF 10 MIN: CPT | Mod: HCNC,S$GLB,, | Performed by: NURSE PRACTITIONER

## 2019-08-16 PROCEDURE — 3079F PR MOST RECENT DIASTOLIC BLOOD PRESSURE 80-89 MM HG: ICD-10-PCS | Mod: HCNC,CPTII,S$GLB, | Performed by: NURSE PRACTITIONER

## 2019-08-16 PROCEDURE — 1101F PT FALLS ASSESS-DOCD LE1/YR: CPT | Mod: HCNC,CPTII,S$GLB, | Performed by: NURSE PRACTITIONER

## 2019-08-16 PROCEDURE — 1101F PR PT FALLS ASSESS DOC 0-1 FALLS W/OUT INJ PAST YR: ICD-10-PCS | Mod: HCNC,CPTII,S$GLB, | Performed by: NURSE PRACTITIONER

## 2019-08-16 PROCEDURE — 3077F SYST BP >= 140 MM HG: CPT | Mod: HCNC,CPTII,S$GLB, | Performed by: NURSE PRACTITIONER

## 2019-08-16 NOTE — PROGRESS NOTES
Subjective:       Patient ID: Sherin Ortiz is a 74 y.o. female.    Chief Complaint: Wound Check    Wound Check     Wound Check:   This patient is seen today for reevaluation of recurrent ulcers to both feet and lower legs.  Unna boots are on both lower legs. The leg wounds are healed and the right heel and left great toe wounds are healing as evidenced by wound contracture.  She has home health services through [facility]. Problems that interfere with wound healing include multiple co-morbidities, diabetes, edema, diabetic neuropathy and  morbid obesity. Because of safety issues we are unable to weigh the patient as she is unstable on her feet.  The patient is afebrile. The patient ambulates with great difficulty secondary to her body habitus and uses a motorized wheelchair which we cannot get on the scale to weigh her.  She does not complain of pain.  She denies increased swelling, redness or purulent drainage.   She is afebrile.  Review of Systems    Unchanged from previous visit.  Objective:      Physical Exam   Constitutional: She is oriented to person, place, and time. No distress.   Morbidly obese   HENT:   Head: Normocephalic and atraumatic.   Neck: Normal range of motion. Neck supple.   Pulmonary/Chest: Effort normal.   Musculoskeletal: Normal range of motion. She exhibits edema. She exhibits no tenderness.        Feet:    Neurological: She is alert and oriented to person, place, and time.   Skin: Skin is warm and dry. Rash (dermatitis and tinea bilateral lower extremities) noted. She is not diaphoretic. No cyanosis or erythema. No pallor. Nails show no clubbing.   Psychiatric: She has a normal mood and affect. Her behavior is normal. Judgment and thought content normal.   Nursing note and vitals reviewed.    ..  Hemoglobin A1C   Date Value Ref Range Status   03/22/2019 9.8 (H) 4.0 - 5.6 % Final     Comment:     ADA Screening Guidelines:  5.7-6.4%  Consistent with prediabetes  >or=6.5%  Consistent with  diabetes  High levels of fetal hemoglobin interfere with the HbA1C  assay. Heterozygous hemoglobin variants (HbS, HgC, etc)do  not significantly interfere with this assay.   However, presence of multiple variants may affect accuracy.     08/13/2018 10.6 (H) 4.0 - 5.6 % Final     Comment:     ADA Screening Guidelines:  5.7-6.4%  Consistent with prediabetes  >or=6.5%  Consistent with diabetes  High levels of fetal hemoglobin interfere with the HbA1C  assay. Heterozygous hemoglobin variants (HbS, HgC, etc)do  not significantly interfere with this assay.   However, presence of multiple variants may affect accuracy.     10/09/2017 9.9 (H) 4.0 - 5.6 % Final     Comment:     According to ADA guidelines, hemoglobin A1c <7.0% represents  optimal control in non-pregnant diabetic patients. Different  metrics may apply to specific patient populations.   Standards of Medical Care in Diabetes-2016.  For the purpose of screening for the presence of diabetes:  <5.7%     Consistent with the absence of diabetes  5.7-6.4%  Consistent with increasing risk for diabetes   (prediabetes)  >or=6.5%  Consistent with diabetes  Currently, no consensus exists for use of hemoglobin A1c  for diagnosis of diabetes for children.  This Hemoglobin A1c assay has significant interference with fetal   hemoglobin   (HbF). The results are invalid for patients with abnormal amounts of   HbF,   including those with known Hereditary Persistence   of Fetal Hemoglobin. Heterozygous hemoglobin variants (HbAS, HbAC,   HbAD, HbAE, HbA2) do not significantly interfere with this assay;   however, presence of multiple variants in a sample may impact the %   interference.       ..  Lab Results   Component Value Date    ALBUMIN 2.5 (L) 06/03/2019       Assessment:       1. Ulcer of toe, right, with fat layer exposed    2. Ulcer of right heel and midfoot with fat layer exposed    3. Lymphedema    4. Venous stasis dermatitis of both lower extremities    5. Tinea pedis  of both feet        Plan:           Circaid compression stockings bilateral lower legs.  Mepilex foam to left great toe ulcer, cover with cotton gauze and secure with coban.  Change dressing twice weekly.  [facility] notified of orders via email. We will ask them to continue to see the patient two times weekly.    Home Health Orders:    Triamcinolone cream to bilateral lower legs.  Ketoconazole cream to both feet.  Cleanse wound with wound cleanser.  Apply mepilex foam to left great toe ulcer, cover with cotton gauze and secure with coban.  Kerlix and coban bilateral lower legs.  May wear circaid stockings when available.  Skilled nurse visit-2 x weekly    Right medial heel      Left great toe pre debridement    Post debridement

## 2019-08-16 NOTE — Clinical Note
She is almost healed once again.  I had a long discussion about having her children come to help her get stockings on.  I gave her a prescription for circaids.  Doubt this will happen and she will probably end up coming back, but we will see.  Have a good weekend!Yumiko

## 2019-08-16 NOTE — PATIENT INSTRUCTIONS
Go first thing in the morning with your legs wrapped to get fitted for stockings.  Unwrap your legs there.  Apply compression stockings first thing in the morning and remove at bedtime.  Do not sleep in your stockings.

## 2019-08-19 ENCOUNTER — EXTERNAL HOME HEALTH (OUTPATIENT)
Dept: HOME HEALTH SERVICES | Facility: HOSPITAL | Age: 74
End: 2019-08-19
Payer: MEDICARE

## 2019-08-20 ENCOUNTER — TELEPHONE (OUTPATIENT)
Dept: HOME HEALTH SERVICES | Facility: HOSPITAL | Age: 74
End: 2019-08-20

## 2019-08-22 ENCOUNTER — TELEPHONE (OUTPATIENT)
Dept: WOUND CARE | Facility: CLINIC | Age: 74
End: 2019-08-22

## 2019-08-22 NOTE — TELEPHONE ENCOUNTER
----- Message from Mer Zarate sent at 8/22/2019  9:35 AM CDT -----  Contact: Abner lee/ [facility] [phone]  Needs Advice    Reason for call: Abner called to let you know that all of the pts wound are healed at this time; Abner would like to know if the pt can be dishcarged next week.         Communication Preference: Abner lee/ [facility] [phone]    Additional Information:

## 2019-08-23 ENCOUNTER — TELEPHONE (OUTPATIENT)
Dept: WOUND CARE | Facility: CLINIC | Age: 74
End: 2019-08-23

## 2019-08-23 DIAGNOSIS — I89.0 LYMPHEDEMA OF BOTH LOWER EXTREMITIES: Primary | ICD-10-CM

## 2019-08-28 ENCOUNTER — TELEPHONE (OUTPATIENT)
Dept: INTERNAL MEDICINE | Facility: CLINIC | Age: 74
End: 2019-08-28

## 2019-08-28 RX ORDER — TRIAMCINOLONE ACETONIDE 1 MG/G
CREAM TOPICAL
Qty: 454 G | Refills: 0 | Status: ON HOLD | OUTPATIENT
Start: 2019-08-28 | End: 2020-12-10 | Stop reason: HOSPADM

## 2019-09-10 ENCOUNTER — PES CALL (OUTPATIENT)
Dept: ADMINISTRATIVE | Facility: CLINIC | Age: 74
End: 2019-09-10

## 2019-09-13 ENCOUNTER — TELEPHONE (OUTPATIENT)
Dept: INTERNAL MEDICINE | Facility: CLINIC | Age: 74
End: 2019-09-13

## 2019-09-13 NOTE — TELEPHONE ENCOUNTER
----- Message from Brea Salgado sent at 9/13/2019  4:26 PM CDT -----  Contact: [phone] / Cristi Wong/ ref#[id number] /fax [phone]  Cristi Wong is requesting a call back from the office regarding a paperwork for the patient that was fax over on 9-5-2019.    Please advise, thank you.

## 2019-09-27 ENCOUNTER — TELEPHONE (OUTPATIENT)
Dept: INTERNAL MEDICINE | Facility: CLINIC | Age: 74
End: 2019-09-27

## 2019-09-27 DIAGNOSIS — E78.5 HYPERLIPIDEMIA LDL GOAL <100: ICD-10-CM

## 2019-09-27 DIAGNOSIS — I10 ESSENTIAL HYPERTENSION: Primary | ICD-10-CM

## 2019-09-27 DIAGNOSIS — E55.9 MILD VITAMIN D DEFICIENCY: ICD-10-CM

## 2019-09-27 NOTE — TELEPHONE ENCOUNTER
Procedures Ordered This Visit    CBC auto differential           Comprehensive metabolic panel           Hemoglobin A1c           Lipid panel           Vitamin D

## 2019-09-27 NOTE — TELEPHONE ENCOUNTER
----- Message from Raquel Webster sent at 9/27/2019  8:23 AM CDT -----  Contact: Pt self Mobile [phone]  Doctor appointment and lab have been scheduled.  Please link lab orders to the lab appointment.  Date of doctor appointment:  10/15/2019  Date of lab appointment:  10/15/2019  Physical or EP:   Comments: Patient requested to have her labs done on the same day as her physical.

## 2019-09-27 NOTE — TELEPHONE ENCOUNTER
----- Message from Raquel Webster sent at 9/27/2019  8:24 AM CDT -----  Contact: Pt self Mobile/Home [phone]   Patient would like a call back in regards to her saying that an order was sent to you twice from [employer] for an order for a new battery for the patient. She would like a call back in regards to her wanting to know if you have received it please?

## 2019-10-07 NOTE — TELEPHONE ENCOUNTER
Spoke with pt. Pt confirmed she is still taking the amaryl . Stated she takes 2 tablets at lunch time.

## 2019-10-07 NOTE — TELEPHONE ENCOUNTER
----- Message from Zena Bess sent at 10/7/2019  8:35 AM CDT -----  Contact: [facility] [phone]  Patient is calling for an RX refill or new RX.  Is this a refill or new RX:  Refill  RX name and strength: glimepiride (AMARYL) 1 MG tablet  Directions (copy/paste from chart):    Is this a 30 day or 90 day RX:  90 days  Local pharmacy or mail order pharmacy:  Mail order  Pharmacy name and phone # (copy/paste from chart):  [facility] - [place] [id number] [facility] [phone] (Phone)  [phone] (Fax)   Comments:      Diabetic or Medical Supplies.  What supplies are needed: ACCU-CHEK RAMIRO METER  What is the brand name of the supplies: Accu-Chek  Is this a refill or new prescription:  refill  Who prescribed the supplies:  [employer]  Pharmacy or company name, phone # and location: [facility] - [place] [id number] [facility] [phone] (Phone)  [phone] (Fax)   Comments:

## 2019-10-08 RX ORDER — DEXTROSE 4 G
TABLET,CHEWABLE ORAL
Qty: 1 EACH | Refills: 0 | Status: SHIPPED | OUTPATIENT
Start: 2019-10-08 | End: 2022-01-18 | Stop reason: CLARIF

## 2019-10-08 RX ORDER — GLIMEPIRIDE 1 MG/1
2 TABLET ORAL DAILY
Qty: 180 TABLET | Refills: 3 | Status: ON HOLD | OUTPATIENT
Start: 2019-10-08 | End: 2020-12-10 | Stop reason: HOSPADM

## 2019-11-06 ENCOUNTER — LAB VISIT (OUTPATIENT)
Dept: LAB | Facility: HOSPITAL | Age: 74
End: 2019-11-06
Attending: INTERNAL MEDICINE
Payer: MEDICARE

## 2019-11-06 ENCOUNTER — OFFICE VISIT (OUTPATIENT)
Dept: INTERNAL MEDICINE | Facility: CLINIC | Age: 74
End: 2019-11-06
Payer: MEDICARE

## 2019-11-06 VITALS
HEIGHT: 67 IN | OXYGEN SATURATION: 98 % | HEART RATE: 72 BPM | SYSTOLIC BLOOD PRESSURE: 130 MMHG | BODY MASS INDEX: 40.72 KG/M2 | DIASTOLIC BLOOD PRESSURE: 80 MMHG

## 2019-11-06 DIAGNOSIS — E21.3 HYPERPARATHYROIDISM: ICD-10-CM

## 2019-11-06 DIAGNOSIS — I77.9 PAOD (PERIPHERAL ARTERIAL OCCLUSIVE DISEASE): ICD-10-CM

## 2019-11-06 DIAGNOSIS — R26.2 INABILITY TO WALK: ICD-10-CM

## 2019-11-06 DIAGNOSIS — E78.5 HYPERLIPIDEMIA LDL GOAL <100: Chronic | ICD-10-CM

## 2019-11-06 DIAGNOSIS — I70.0 AORTIC ATHEROSCLEROSIS: ICD-10-CM

## 2019-11-06 PROBLEM — N17.9 ACUTE RENAL FAILURE SUPERIMPOSED ON STAGE 3 CHRONIC KIDNEY DISEASE: Status: RESOLVED | Noted: 2019-03-22 | Resolved: 2019-11-06

## 2019-11-06 PROBLEM — N18.30 ACUTE RENAL FAILURE SUPERIMPOSED ON STAGE 3 CHRONIC KIDNEY DISEASE: Status: RESOLVED | Noted: 2019-03-22 | Resolved: 2019-11-06

## 2019-11-06 LAB
ALBUMIN SERPL BCP-MCNC: 3.3 G/DL (ref 3.5–5.2)
ALP SERPL-CCNC: 214 U/L (ref 55–135)
ALT SERPL W/O P-5'-P-CCNC: 20 U/L (ref 10–44)
ANION GAP SERPL CALC-SCNC: 7 MMOL/L (ref 8–16)
AST SERPL-CCNC: 17 U/L (ref 10–40)
BASOPHILS # BLD AUTO: 0.01 K/UL (ref 0–0.2)
BASOPHILS NFR BLD: 0.2 % (ref 0–1.9)
BILIRUB SERPL-MCNC: 0.5 MG/DL (ref 0.1–1)
BUN SERPL-MCNC: 31 MG/DL (ref 8–23)
CALCIUM SERPL-MCNC: 9.9 MG/DL (ref 8.7–10.5)
CHLORIDE SERPL-SCNC: 102 MMOL/L (ref 95–110)
CHOLEST SERPL-MCNC: 148 MG/DL (ref 120–199)
CHOLEST/HDLC SERPL: 2.9 {RATIO} (ref 2–5)
CO2 SERPL-SCNC: 30 MMOL/L (ref 23–29)
CREAT SERPL-MCNC: 1.3 MG/DL (ref 0.5–1.4)
DIFFERENTIAL METHOD: ABNORMAL
EOSINOPHIL # BLD AUTO: 0.1 K/UL (ref 0–0.5)
EOSINOPHIL NFR BLD: 1.3 % (ref 0–8)
ERYTHROCYTE [DISTWIDTH] IN BLOOD BY AUTOMATED COUNT: 13 % (ref 11.5–14.5)
EST. GFR  (AFRICAN AMERICAN): 46 ML/MIN/1.73 M^2
EST. GFR  (NON AFRICAN AMERICAN): 40 ML/MIN/1.73 M^2
ESTIMATED AVG GLUCOSE: 229 MG/DL (ref 68–131)
GLUCOSE SERPL-MCNC: 111 MG/DL (ref 70–110)
HBA1C MFR BLD HPLC: 9.6 % (ref 4–5.6)
HCT VFR BLD AUTO: 34.9 % (ref 37–48.5)
HDLC SERPL-MCNC: 51 MG/DL (ref 40–75)
HDLC SERPL: 34.5 % (ref 20–50)
HGB BLD-MCNC: 11.8 G/DL (ref 12–16)
LDLC SERPL CALC-MCNC: 68.8 MG/DL (ref 63–159)
LYMPHOCYTES # BLD AUTO: 1.4 K/UL (ref 1–4.8)
LYMPHOCYTES NFR BLD: 26.8 % (ref 18–48)
MCH RBC QN AUTO: 29.7 PG (ref 27–31)
MCHC RBC AUTO-ENTMCNC: 33.8 G/DL (ref 32–36)
MCV RBC AUTO: 88 FL (ref 82–98)
MONOCYTES # BLD AUTO: 0.3 K/UL (ref 0.3–1)
MONOCYTES NFR BLD: 6.3 % (ref 4–15)
NEUTROPHILS # BLD AUTO: 3.4 K/UL (ref 1.8–7.7)
NEUTROPHILS NFR BLD: 65.4 % (ref 38–73)
NONHDLC SERPL-MCNC: 97 MG/DL
PLATELET # BLD AUTO: 285 K/UL (ref 150–350)
PMV BLD AUTO: 8.9 FL (ref 9.2–12.9)
POTASSIUM SERPL-SCNC: 4.6 MMOL/L (ref 3.5–5.1)
PROT SERPL-MCNC: 7.9 G/DL (ref 6–8.4)
RBC # BLD AUTO: 3.97 M/UL (ref 4–5.4)
SODIUM SERPL-SCNC: 139 MMOL/L (ref 136–145)
TRIGL SERPL-MCNC: 141 MG/DL (ref 30–150)
WBC # BLD AUTO: 5.26 K/UL (ref 3.9–12.7)

## 2019-11-06 PROCEDURE — 80061 LIPID PANEL: CPT | Mod: HCNC

## 2019-11-06 PROCEDURE — 3075F SYST BP GE 130 - 139MM HG: CPT | Mod: HCNC,CPTII,S$GLB, | Performed by: INTERNAL MEDICINE

## 2019-11-06 PROCEDURE — 99499 RISK ADDL DX/OHS AUDIT: ICD-10-PCS | Mod: HCNC,S$GLB,, | Performed by: INTERNAL MEDICINE

## 2019-11-06 PROCEDURE — G0008 ADMIN INFLUENZA VIRUS VAC: HCPCS | Mod: HCNC,S$GLB,, | Performed by: INTERNAL MEDICINE

## 2019-11-06 PROCEDURE — 99499 UNLISTED E&M SERVICE: CPT | Mod: HCNC,S$GLB,, | Performed by: INTERNAL MEDICINE

## 2019-11-06 PROCEDURE — 83036 HEMOGLOBIN GLYCOSYLATED A1C: CPT | Mod: HCNC

## 2019-11-06 PROCEDURE — 1101F PT FALLS ASSESS-DOCD LE1/YR: CPT | Mod: HCNC,CPTII,S$GLB, | Performed by: INTERNAL MEDICINE

## 2019-11-06 PROCEDURE — 3075F PR MOST RECENT SYSTOLIC BLOOD PRESS GE 130-139MM HG: ICD-10-PCS | Mod: HCNC,CPTII,S$GLB, | Performed by: INTERNAL MEDICINE

## 2019-11-06 PROCEDURE — 99999 PR PBB SHADOW E&M-EST. PATIENT-LVL III: ICD-10-PCS | Mod: PBBFAC,HCNC,, | Performed by: INTERNAL MEDICINE

## 2019-11-06 PROCEDURE — 3079F DIAST BP 80-89 MM HG: CPT | Mod: HCNC,CPTII,S$GLB, | Performed by: INTERNAL MEDICINE

## 2019-11-06 PROCEDURE — 99214 PR OFFICE/OUTPT VISIT, EST, LEVL IV, 30-39 MIN: ICD-10-PCS | Mod: 25,HCNC,S$GLB, | Performed by: INTERNAL MEDICINE

## 2019-11-06 PROCEDURE — 99214 OFFICE O/P EST MOD 30 MIN: CPT | Mod: 25,HCNC,S$GLB, | Performed by: INTERNAL MEDICINE

## 2019-11-06 PROCEDURE — 85025 COMPLETE CBC W/AUTO DIFF WBC: CPT | Mod: HCNC

## 2019-11-06 PROCEDURE — 1101F PR PT FALLS ASSESS DOC 0-1 FALLS W/OUT INJ PAST YR: ICD-10-PCS | Mod: HCNC,CPTII,S$GLB, | Performed by: INTERNAL MEDICINE

## 2019-11-06 PROCEDURE — G0008 FLU VACCINE - HIGH DOSE (65+) PRESERVATIVE FREE IM: ICD-10-PCS | Mod: HCNC,S$GLB,, | Performed by: INTERNAL MEDICINE

## 2019-11-06 PROCEDURE — 80053 COMPREHEN METABOLIC PANEL: CPT | Mod: HCNC

## 2019-11-06 PROCEDURE — 99999 PR PBB SHADOW E&M-EST. PATIENT-LVL III: CPT | Mod: PBBFAC,HCNC,, | Performed by: INTERNAL MEDICINE

## 2019-11-06 PROCEDURE — 90662 IIV NO PRSV INCREASED AG IM: CPT | Mod: HCNC,S$GLB,, | Performed by: INTERNAL MEDICINE

## 2019-11-06 PROCEDURE — 36415 COLL VENOUS BLD VENIPUNCTURE: CPT | Mod: HCNC

## 2019-11-06 PROCEDURE — 3079F PR MOST RECENT DIASTOLIC BLOOD PRESSURE 80-89 MM HG: ICD-10-PCS | Mod: HCNC,CPTII,S$GLB, | Performed by: INTERNAL MEDICINE

## 2019-11-06 PROCEDURE — 90662 FLU VACCINE - HIGH DOSE (65+) PRESERVATIVE FREE IM: ICD-10-PCS | Mod: HCNC,S$GLB,, | Performed by: INTERNAL MEDICINE

## 2019-11-06 RX ORDER — BUMETANIDE 1 MG/1
2 TABLET ORAL DAILY
Qty: 180 TABLET | Refills: 3 | Status: ON HOLD | OUTPATIENT
Start: 2019-11-06 | End: 2021-02-01 | Stop reason: SDUPTHER

## 2019-11-06 RX ORDER — BUMETANIDE 1 MG/1
1 TABLET ORAL DAILY
COMMUNITY
End: 2019-11-06 | Stop reason: SDUPTHER

## 2019-11-06 NOTE — PROGRESS NOTES
Subjective:      Patient ID: Sherin Ortiz is a 74 y.o. female.    Chief Complaint: Follow-up    HPI:  HPIPatient is here for follow up of diabetes. She is not able to walk even with a walker but is able to pivot from her motorized wheel chair to the bed or bathroom.    She continues to see Wound Care and has Home Health coming twice a week. She has a new wound on the heel and the front of the leg.    Diabetes Management Status    Statin: Taking  ACE/ARB: Not taking    Screening or Prevention Patient's value Goal Complete/Controlled?   HgA1C Testing and Control   Lab Results   Component Value Date    HGBA1C 9.6 (H) 11/06/2019      Annually/Less than 8% No   Lipid profile : 11/06/2019 Annually No   LDL control Lab Results   Component Value Date    LDLCALC 68.8 11/06/2019    Annually/Less than 100 mg/dl  No   Nephropathy screening Lab Results   Component Value Date    LABMICR 29.1 09/26/2011     Lab Results   Component Value Date    PROTEINUA 2+ (A) 05/10/2019    Annually Yes   Blood pressure BP Readings from Last 1 Encounters:   11/06/19 130/80    Less than 140/90 Yes   Dilated retinal exam : 05/29/2019 Annually Yes   Foot exam   : 05/30/2019 Annually Yes       Patient Active Problem List   Diagnosis    Bilateral leg edema    Tinea pedis    Hyperlipidemia LDL goal <100    Essential hypertension    PSC (posterior subcapsular cataract) - Both Eyes    Nuclear sclerosis - Both Eyes    Asteroid hyalosis - Left Eye    Stage 3 chronic kidney disease    Dystrophic nail    Hyponatremia    Weakness    Inability to walk    Morbid obesity with BMI of 40.0-44.9, adult    Diabetic peripheral neuropathy associated with type 2 diabetes mellitus    Anemia of chronic disease    Hypoalbuminemia    Wheelchair dependent    Venous insufficiency of leg    Dermatitis, stasis    Uncontrolled type 2 diabetes mellitus with stage 3 chronic kidney disease, with long-term current use of insulin    Hyperparathyroidism     Aortic atherosclerosis    PAOD (peripheral arterial occlusive disease)    Right shoulder strain, initial encounter    Hx of transient ischemic attack (TIA)    (HFpEF) heart failure with preserved ejection fraction    Ulcer of right heel and midfoot with fat layer exposed    Leukocytosis    Hyperglycemia    Prolonged Q-T interval on ECG    Hyperbilirubinemia    Goals of care, counseling/discussion    Ulcer of toe, right, with fat layer exposed    Acute gout of left hand    Left wrist pain    Dermatitis associated with moisture    Lymphedema     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    Benign essential hypertension 11/14/2012    Cataract     s/p phacoemulsification    Chronic kidney disease (CKD), stage III (moderate) 9/12/2013    Diabetic peripheral neuropathy associated with type 2 diabetes mellitus 11/14/2014    causing right hemiparesis    Gait disorder     Hyperlipidemia     Iritis - Both Eyes 6/10/2013    Kidney stone     Lymphedema     Morbid obesity with BMI of 40.0-44.9, adult 2/18/2015    Nephrolithiasis 4/20/2016    NS (nuclear sclerosis) 4/1/2013    Nuclear sclerosis - Both Eyes 4/29/2013    Preseptal cellulitis - Right Eye 4/29/2013    Proliferative diabetic retinopathy - Both Eyes 4/29/2013    Proliferative diabetic retinopathy, both eyes 4/1/2013    PSC (posterior subcapsular cataract) - Both Eyes 4/29/2013    S/P hernia repair 12/19/2012    TIA (transient ischemic attack) 11/18/2014    Tinea pedis 7/24/2012    Tinea pedis is present on both feet.     Type 2 diabetes mellitus with renal manifestations, controlled 12/12/2013    Type 2 diabetes, controlled, with moderate nonproliferative diabetic retinopathy without macular edema 9/17/2015    Ulcer of left lower extremity, limited to breakdown of skin 7/8/2015    Unspecified cerebral artery occlusion with cerebral infarction 11/16/2014    Unspecified venous  "(peripheral) insufficiency     Ureteral stone with hydronephrosis 1/27/2016    UTI (lower urinary tract infection)     Vaginal infection     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     Past Surgical History:   Procedure Laterality Date    APPENDECTOMY      CATARACT EXTRACTION W/  INTRAOCULAR LENS IMPLANT Left 5/21/2013    CATARACT EXTRACTION W/  INTRAOCULAR LENS IMPLANT Right 6/4/2013    CHOLECYSTECTOMY      COLONOSCOPY  12/22/2005    normal    ESOPHAGOGASTRODUODENOSCOPY  12/21/2015    hiatal hernia, Schatzki ring    EYE SURGERY Bilateral 2008    laser surgery both eyes    NASAL SEPTUM SURGERY      SUBTOTAL COLECTOMY  12/13/2012    transverse colon, for incarcerated umbilical hernia, Dr. Kat Bower     Family History   Problem Relation Age of Onset    Diabetes Sister     Cataracts Sister     Heart disease Brother     Cataracts Brother     Leukemia Mother     Cancer Neg Hx     Amblyopia Neg Hx     Blindness Neg Hx     Glaucoma Neg Hx     Hypertension Neg Hx     Macular degeneration Neg Hx     Retinal detachment Neg Hx     Strabismus Neg Hx     Stroke Neg Hx     Thyroid disease Neg Hx     Kidney disease Neg Hx      Review of Systems   Constitutional: Negative for chills, fever and unexpected weight change.   HENT: Negative for trouble swallowing.    Respiratory: Negative for cough, shortness of breath and wheezing.    Cardiovascular: Negative for chest pain and palpitations.   Gastrointestinal: Negative for abdominal distention, abdominal pain, blood in stool and vomiting.   Musculoskeletal: Negative for back pain.     Objective:     Vitals:    11/06/19 1345 11/06/19 1407   BP: (!) 130/90 130/80   Pulse: 72    SpO2: 98%    Height: 5' 7" (1.702 m)    PainSc: 0-No pain      Body mass index is 40.72 kg/m².  Physical Exam   Constitutional: She is oriented to person, place, and time. She appears well-developed and well-nourished. No distress.   Neck: Carotid bruit is not present. No " thyromegaly present.   Cardiovascular: Normal rate, regular rhythm and normal heart sounds. PMI is not displaced.   Pulmonary/Chest: Effort normal and breath sounds normal. No respiratory distress.   Abdominal: Soft. Bowel sounds are normal. She exhibits no distension. There is no tenderness.   Musculoskeletal: She exhibits edema.   Neurological: She is alert and oriented to person, place, and time.     Assessment:     1. Uncontrolled type 2 diabetes mellitus with stage 3 chronic kidney disease, with long-term current use of insulin    2. Aortic atherosclerosis    3. PAOD (peripheral arterial occlusive disease)    4. Hyperparathyroidism    5. Hyperlipidemia LDL goal <100    6. Inability to walk      Plan:   Sherin was seen today for follow-up.    Diagnoses and all orders for this visit:    Uncontrolled type 2 diabetes mellitus with stage 3 chronic kidney disease, with long-term current use of insulin  Comments:  Will check lab  Orders:  -     CBC auto differential; Future  -     Comprehensive metabolic panel; Future  -     Hemoglobin A1c; Future  -     Lipid panel; Future    Aortic atherosclerosis  Comments:  On asa and statin    PAOD (peripheral arterial occlusive disease)  Comments:  Aware    Hyperparathyroidism    Hyperlipidemia LDL goal <100  Comments:  On statin    Inability to walk  Comments:  In Power Wheel Chair: can only pivot    Other orders  -     bumetanide (BUMEX) 1 MG tablet; Take 2 tablets (2 mg total) by mouth once daily.  -     Influenza - High Dose (65+) (PF) (IM)        Problem List Items Addressed This Visit     Hyperlipidemia LDL goal <100 (Chronic)    Uncontrolled type 2 diabetes mellitus with stage 3 chronic kidney disease, with long-term current use of insulin - Primary (Chronic)    Relevant Orders    CBC auto differential (Completed)    Comprehensive metabolic panel (Completed)    Hemoglobin A1c (Completed)    Lipid panel (Completed)    Inability to walk    Hyperparathyroidism    Aortic  "atherosclerosis    Overview     CXR 1/2016         PAOD (peripheral arterial occlusive disease)    Overview     Location in Record and Date:   VAS DIS[facility]-9/18/2015    "Rt DISHA (1.12) Segment/Brachial Index and PVR wavef  orms indicate minimal peripheral arterial obstructive disease.  Lt DISHA (1.09) Segment/Brachial Index and PVR waveforms indicate minimal peripheral arterial obstructive disease."  Other Chronic Conditions:  HLD, DM    Medications:  Aspirin 81 mg, Lipi  tor 40 mg             Orders Placed This Encounter   Procedures    Influenza - High Dose (65+) (PF) (IM)    CBC auto differential     Standing Status:   Future     Number of Occurrences:   1     Standing Expiration Date:   1/5/2020    Comprehensive metabolic panel     Standing Status:   Future     Number of Occurrences:   1     Standing Expiration Date:   1/5/2020    Hemoglobin A1c     Standing Status:   Future     Number of Occurrences:   1     Standing Expiration Date:   1/5/2020    Lipid panel     Standing Status:   Future     Number of Occurrences:   1     Standing Expiration Date:   1/5/2020     Follow up in about 4 months (around 3/6/2020) for Follow up.     Medication List           Accurate as of November 6, 2019 11:59 PM. If you have any questions, ask your nurse or doctor.               CHANGE how you take these medications    bumetanide 1 MG tablet  Commonly known as:  BUMEX  Take 2 tablets (2 mg total) by mouth once daily.  What changed:    · how much to take  · additional instructions  Changed by:  Ame Vasquez MD     LANTUS U-100 INSULIN 100 unit/mL injection  Generic drug:  insulin glargine  INJECT 7 TO 10 UNITS SUBCUTANEOUSLY IN THE EVENING DEPENDING ON SCALE (DISCARD EACH VIAL AFTER 28 DAYS)  What changed:  See the new instructions.        CONTINUE taking these medications    ACCU-CHEK RAMIRO PLUS TEST STRP Strp  Generic drug:  blood sugar diagnostic  TEST TWICE DAILY     ACCU-CHEK SOFTCLIX LANCETS Misc  Generic drug:  lancets   " "  aspirin 81 MG Chew     atorvastatin 40 MG tablet  Commonly known as:  LIPITOR  TAKE 1 TABLET EVERY EVENING     BD INSULIN SYRINGE ULTRA-FINE 0.5 mL 30 gauge x 1/2" Syrg  Generic drug:  insulin syringe-needle U-100  USE TO INJECT EVERY NIGHT     blood-glucose meter Misc  Dispense one meter: Insurance Brand Preference Accu-Chek     clopidogrel 75 mg tablet  Commonly known as:  PLAVIX  TAKE 1 TABLET EVERY DAY     glimepiride 1 MG tablet  Commonly known as:  AMARYL  Take 2 tablets (2 mg total) by mouth once daily.     ketoconazole 2 % cream  Commonly known as:  NIZORAL     miconazole nitrate 2% 2 % Oint  Commonly known as:  MICOTIN  Apply topically 2 (two) times daily.     sodium chloride 0.9% solution     triamcinolone acetonide 0.1% 0.1 % cream  Commonly known as:  KENALOG  APPLY TOPICALLY TWICE DAILY           Where to Get Your Medications      These medications were sent to [facility] - [place], [place] - [address]  [address], [facility] [address]    Phone:  [phone]   · bumetanide 1 MG tablet         "

## 2019-11-07 ENCOUNTER — TELEPHONE (OUTPATIENT)
Dept: WOUND CARE | Facility: CLINIC | Age: 74
End: 2019-11-07

## 2019-11-07 NOTE — TELEPHONE ENCOUNTER
----- Message from Naty Perkins sent at 11/7/2019  2:46 PM CST -----  Contact: pt  Pt is scheduled for 7 am tomorrow but transportation will not be there to  pt until 7 am . Please give pt a call back at [phone] for advice.

## 2019-11-07 NOTE — TELEPHONE ENCOUNTER
Called and spoke with patient who advised transportation service can not pick her up before 7am therefore she would be late for her scheduled 7am appointment tomorrow.  Appointment rescheduled for Friday, 11/29/19 at 220pm - appointment slip mailed out.  Will keep patient on wait list for sooner appointment if a cancellation should become available

## 2019-11-09 ENCOUNTER — TELEPHONE (OUTPATIENT)
Dept: INTERNAL MEDICINE | Facility: CLINIC | Age: 74
End: 2019-11-09

## 2019-11-10 NOTE — TELEPHONE ENCOUNTER
Please ask the patient if she would be willing to see our diabetes team in this build [place]. Das and then put her on the schedule if she is. The A1C is still over 9.0.

## 2019-11-11 NOTE — TELEPHONE ENCOUNTER
Spoke with pt. Offered pt appt to see NP Thiago. Pt stated she doesn't want to see someone at this  moment. She knows she has to watch her diet.

## 2019-11-29 ENCOUNTER — OFFICE VISIT (OUTPATIENT)
Dept: WOUND CARE | Facility: CLINIC | Age: 74
End: 2019-11-29
Payer: MEDICARE

## 2019-11-29 ENCOUNTER — TELEPHONE (OUTPATIENT)
Dept: INTERNAL MEDICINE | Facility: CLINIC | Age: 74
End: 2019-11-29

## 2019-11-29 DIAGNOSIS — L97.911 ULCER OF RIGHT LOWER EXTREMITY, LIMITED TO BREAKDOWN OF SKIN: ICD-10-CM

## 2019-11-29 DIAGNOSIS — L97.412 ULCER OF RIGHT HEEL AND MIDFOOT WITH FAT LAYER EXPOSED: Primary | ICD-10-CM

## 2019-11-29 DIAGNOSIS — I87.2 VENOUS INSUFFICIENCY OF BOTH LOWER EXTREMITIES: Chronic | ICD-10-CM

## 2019-11-29 DIAGNOSIS — Z51.89 ENCOUNTER FOR WOUND CARE: Primary | ICD-10-CM

## 2019-11-29 DIAGNOSIS — I89.0 LYMPHEDEMA: ICD-10-CM

## 2019-11-29 DIAGNOSIS — B35.3 TINEA PEDIS OF BOTH FEET: Chronic | ICD-10-CM

## 2019-11-29 DIAGNOSIS — I87.2 VENOUS STASIS DERMATITIS OF BOTH LOWER EXTREMITIES: Chronic | ICD-10-CM

## 2019-11-29 PROBLEM — L97.512 ULCER OF TOE, RIGHT, WITH FAT LAYER EXPOSED: Status: RESOLVED | Noted: 2019-04-12 | Resolved: 2019-11-29

## 2019-11-29 PROCEDURE — 1101F PR PT FALLS ASSESS DOC 0-1 FALLS W/OUT INJ PAST YR: ICD-10-PCS | Mod: HCNC,CPTII,S$GLB, | Performed by: NURSE PRACTITIONER

## 2019-11-29 PROCEDURE — 1101F PT FALLS ASSESS-DOCD LE1/YR: CPT | Mod: HCNC,CPTII,S$GLB, | Performed by: NURSE PRACTITIONER

## 2019-11-29 PROCEDURE — 99213 PR OFFICE/OUTPT VISIT, EST, LEVL III, 20-29 MIN: ICD-10-PCS | Mod: 25,HCNC,S$GLB, | Performed by: NURSE PRACTITIONER

## 2019-11-29 PROCEDURE — 29580 PR STRAPPING UNNA BOOT: ICD-10-PCS | Mod: HCNC,RT,S$GLB, | Performed by: NURSE PRACTITIONER

## 2019-11-29 PROCEDURE — 99999 PR PBB SHADOW E&M-EST. PATIENT-LVL III: ICD-10-PCS | Mod: PBBFAC,HCNC,, | Performed by: NURSE PRACTITIONER

## 2019-11-29 PROCEDURE — 1159F PR MEDICATION LIST DOCUMENTED IN MEDICAL RECORD: ICD-10-PCS | Mod: HCNC,S$GLB,, | Performed by: NURSE PRACTITIONER

## 2019-11-29 PROCEDURE — 29580 STRAPPING UNNA BOOT: CPT | Mod: HCNC,RT,S$GLB, | Performed by: NURSE PRACTITIONER

## 2019-11-29 PROCEDURE — 1159F MED LIST DOCD IN RCRD: CPT | Mod: HCNC,S$GLB,, | Performed by: NURSE PRACTITIONER

## 2019-11-29 PROCEDURE — 99213 OFFICE O/P EST LOW 20 MIN: CPT | Mod: 25,HCNC,S$GLB, | Performed by: NURSE PRACTITIONER

## 2019-11-29 PROCEDURE — 99999 PR PBB SHADOW E&M-EST. PATIENT-LVL III: CPT | Mod: PBBFAC,HCNC,, | Performed by: NURSE PRACTITIONER

## 2019-11-29 NOTE — TELEPHONE ENCOUNTER
----- Message from Akosua Alvarenga NP sent at 11/29/2019  2:44 PM CST -----  She has once again broken down.  She never purchased her circaid stockings and without compression she will never stay healed. This was once again emphasized to her.  Can you please order home health again.  She has had [facility] in the past. My orders are at the bottom of my note.  Thanks and hope you had a nice Thanksgiving.  Yumiko

## 2019-11-29 NOTE — PROGRESS NOTES
Subjective:       Patient ID: Sherin Ortiz is a 74 y.o. female.    Chief Complaint: Wound Check    Wound Check     Wound Check:   This patient is well known to my service.  She is seen today for evaluation and management of recurrent ulcers to the right foot and lower leg.  She reports this started at the end of October 2019, She was last seen in this clinic in August 2019.  We were informed by [facility] that her ulcers were healed on 8/22/19.  Home health continued to see her for 2 stella weeks after that to make sure she did not have a recurrence.  She was supposed to purchase circaid compression stocking to keep the legs healed but she never did get them.  She has responded well to compression therapy in the past. She is home bound and could benefit from home health services. She has had home health services through [facility] in the past. Problems that interfere with wound healing include multiple co-morbidities, diabetes, edema, diabetic neuropathy and  morbid obesity. Because of safety issues we are unable to weigh the patient as she is unstable on her feet.  The patient ambulates with great difficulty secondary to her body habitus and uses a motorized wheelchair which we cannot get on the scale to weigh her.  She does not complain of pain.  She denies increased swelling, redness or purulent drainage.   She is afebrile.  Review of Systems    Unchanged from previous visit.  Objective:      Physical Exam   Constitutional: She is oriented to person, place, and time. No distress.   Morbidly obese   HENT:   Head: Normocephalic and atraumatic.   Neck: Normal range of motion. Neck supple.   Pulmonary/Chest: Effort normal.   Musculoskeletal: Normal range of motion. She exhibits edema. She exhibits no tenderness.        Legs:       Feet:    Neurological: She is alert and oriented to person, place, and time. Abnormal coordination: .   Skin: Skin is warm and dry. Rash (dermatitis and tinea bilateral lower extremities)  noted. She is not diaphoretic. No cyanosis or erythema. No pallor. Nails show no clubbing.   Psychiatric: She has a normal mood and affect. Her behavior is normal. Judgment and thought content normal.   Nursing note and vitals reviewed.      Right shin    Right medial heel    Web space between right first and second toes          Hemoglobin A1C   Date Value Ref Range Status   11/06/2019 9.6 (H) 4.0 - 5.6 % Final     Comment:     ADA Screening Guidelines:  5.7-6.4%  Consistent with prediabetes  >or=6.5%  Consistent with diabetes  High levels of fetal hemoglobin interfere with the HbA1C  assay. Heterozygous hemoglobin variants (HbS, HgC, etc)do  not significantly interfere with this assay.   However, presence of multiple variants may affect accuracy.     03/22/2019 9.8 (H) 4.0 - 5.6 % Final     Comment:     ADA Screening Guidelines:  5.7-6.4%  Consistent with prediabetes  >or=6.5%  Consistent with diabetes  High levels of fetal hemoglobin interfere with the HbA1C  assay. Heterozygous hemoglobin variants (HbS, HgC, etc)do  not significantly interfere with this assay.   However, presence of multiple variants may affect accuracy.     08/13/2018 10.6 (H) 4.0 - 5.6 % Final     Comment:     ADA Screening Guidelines:  5.7-6.4%  Consistent with prediabetes  >or=6.5%  Consistent with diabetes  High levels of fetal hemoglobin interfere with the HbA1C  assay. Heterozygous hemoglobin variants (HbS, HgC, etc)do  not significantly interfere with this assay.   However, presence of multiple variants may affect accuracy.       ..  Lab Results   Component Value Date    ALBUMIN 3.3 (L) 11/06/2019     Sherin was seen in the clinic room and placed in the supine position on the treatment table.  right leg was cleansed with Easi-clense sponges and dried thoroughly.  A hydrofiber dressing was applied to the wounds.  Eucerin cream was applied to the lower legs.  The patient's foot was positioned at a 90 degree angle.  A zinc oxide wrap,  followed by kerlix roll gauze and coban were applied using a spiral technique avoiding creases or folds.  The wrap was started behind the first metatarsal and ended below the tibial tubercle of the knee.  There was overlap of each turn half the width of the previous turn.  The compression wrap will be changed every 3-4 days.    Assessment:       1. Ulcer of right heel and midfoot with fat layer exposed    2. Ulcer of toe, right, with fat layer exposed    3. Venous insufficiency of both lower extremities    4. Lymphedema    5. Tinea pedis of both feet    6. Venous stasis dermatitis of both lower extremities        Plan:           Unna boot right lower leg as detailed above.  Aquacel and cotton in between right first and second toes.  Cotton in between all other toes.  Pad left ankle with ABD pads.  Kerlix and coban left lower leg.  I will see if  can once again order home health.  We will ask them to see the patient twice weekly.  Return to clinic in one month.    Home Health Orders:    Triamcinolone cream to bilateral lower legs.  Ketoconazole cream to both feet.  Cleanse wounds with wound cleanser.  Place aquacel and cotton in between right first and second toes.  Cotton in between all other toes.  Pad ankles with ABD pads.  Unna boot (zinc oxide, kerlix and coban) right lower leg.  Kerlix and coban left lower leg.    Skilled nurse visit-2 x  weekly

## 2019-11-29 NOTE — Clinical Note
She has once again broken down.  She never purchased her circaid stockings and without compression she will never stay healed. This was once again emphasized to her.  Can you please order home health again.  She has had [facility] in the past. My orders are at the bottom of my note.  Thanks and hope you had a nice Thanksgiving.Yumiko

## 2019-11-29 NOTE — PATIENT INSTRUCTIONS
insElevate legs as much as possible. Do not get the dressings wet and use cast covers for showering.  Should the dressing become wet, remove it, place a wet-to-dry dressing over the wound, cover with gauze and roll gauze and use ace wraps for compression and to secure bandages.  Notify home health as soon as possible to have a new dressing applied.

## 2019-12-02 ENCOUNTER — TELEPHONE (OUTPATIENT)
Dept: INTERNAL MEDICINE | Facility: CLINIC | Age: 74
End: 2019-12-02

## 2019-12-02 ENCOUNTER — TELEPHONE (OUTPATIENT)
Dept: WOUND CARE | Facility: CLINIC | Age: 74
End: 2019-12-02

## 2019-12-02 NOTE — TELEPHONE ENCOUNTER
----- Message from Shelby Bullock sent at 12/2/2019 11:48 AM CST -----  Contact: [phone]  Yumiko in wound care requested by message to you that patient need to be seen in wound care.  Please set up an appointment.

## 2019-12-02 NOTE — TELEPHONE ENCOUNTER
Attempted to contact patient in response to message. Voice message left for patient requesting return call.----- Message from Bessy Epperson sent at 12/2/2019 12:08 PM CST -----  Contact: Pt   Reason: Pt is asking for someone to call her pertaining to someone coming out to check her wound.    Communication: [phone]

## 2019-12-05 PROCEDURE — G0180 MD CERTIFICATION HHA PATIENT: HCPCS | Mod: ,,, | Performed by: INTERNAL MEDICINE

## 2019-12-05 PROCEDURE — G0180 PR HOME HEALTH MD CERTIFICATION: ICD-10-PCS | Mod: ,,, | Performed by: INTERNAL MEDICINE

## 2019-12-10 ENCOUNTER — TELEPHONE (OUTPATIENT)
Dept: INTERNAL MEDICINE | Facility: CLINIC | Age: 74
End: 2019-12-10

## 2019-12-10 NOTE — TELEPHONE ENCOUNTER
----- Message from Lynette Zurita MA sent at 12/10/2019 11:22 AM CST -----  Contact: Eboni/[facility] [phone]      ----- Message -----  From: Vee Schulz  Sent: 12/10/2019   9:01 AM CST  To: Pedro THOMPSON Staff    Requesting wound care orders.    Please call and advise.    Thank You

## 2019-12-10 NOTE — TELEPHONE ENCOUNTER
Message from Lynette Zurita MA sent at 12/10/2019 11:22 AM CST -----  Contact: Eboni/[facility] [phone]        ----- Message -----  From: Vee Schulz  Sent: 12/10/2019   9:01 AM CST  To: Pedro THOMPSON Staff     Requesting wound care orders.     Please call and advise.     Thank You          Please call Eboni and tell her that the Wound Care orders are from Yumiko Alvarenga and in the note of Yumiko Alvarenga 11/29 at the end. Please given them to her. I tried but it went to a voicemail. [facility]

## 2019-12-11 ENCOUNTER — TELEPHONE (OUTPATIENT)
Dept: WOUND CARE | Facility: CLINIC | Age: 74
End: 2019-12-11

## 2019-12-11 NOTE — TELEPHONE ENCOUNTER
----- Message from Akosua Alvarenga NP sent at 12/11/2019  6:12 AM CST -----  Contact: Martina the home health nurse  She needs to come in or the nurse needs to send photosRafaela Calderon  ----- Message -----  From: Freddie Canada  Sent: 12/9/2019   3:57 PM CST  To: Akosua Alvarenga NP        The caller state that the Pt has a wound to the right leg and the heel and needs wound care orders to be able to care for them.    Phone # [phone] Martina

## 2019-12-11 NOTE — TELEPHONE ENCOUNTER
----- Message from Akosua Alvarenga NP sent at 12/11/2019  6:12 AM CST -----  Contact: Martina the home health nurse  She needs to come in or the nurse needs to send photosRafaela Calderon  ----- Message -----  From: Freddie Canada  Sent: 12/9/2019   3:57 PM CST  To: Akosua Alvarenga NP        The caller state that the Pt has a wound to the right leg and the heel and needs wound care orders to be able to care for them.    Phone # [phone] Martina

## 2019-12-11 NOTE — TELEPHONE ENCOUNTER
Called and spoke with  nurse who is looking for wound care orders for the right anterior shin wound and right medial heel wound - advised I will forward message to Yumiko to review current wound care orders from last visit on 11/29/19 and resend orders as requested.

## 2019-12-12 ENCOUNTER — TELEPHONE (OUTPATIENT)
Dept: HOME HEALTH SERVICES | Facility: HOSPITAL | Age: 74
End: 2019-12-12

## 2019-12-12 ENCOUNTER — EXTERNAL HOME HEALTH (OUTPATIENT)
Dept: HOME HEALTH SERVICES | Facility: HOSPITAL | Age: 74
End: 2019-12-12
Payer: MEDICARE

## 2019-12-26 ENCOUNTER — TELEPHONE (OUTPATIENT)
Dept: WOUND CARE | Facility: CLINIC | Age: 74
End: 2019-12-26

## 2019-12-27 NOTE — TELEPHONE ENCOUNTER
----- Message from Freddie Canada sent at 12/26/2019  4:16 PM CST -----  Contact: Abner with [facility] home health      The caller states that the Pt has a new wound on her right lower leg and would like to have orders to treat it.    Phone # [phone] (Abner's phone #)

## 2019-12-27 NOTE — TELEPHONE ENCOUNTER
Returned call no answer, left voice message advising Abner, that I will forward message to Yumiko regarding needing wound care orders for new wound.

## 2020-01-02 ENCOUNTER — TELEPHONE (OUTPATIENT)
Dept: WOUND CARE | Facility: CLINIC | Age: 75
End: 2020-01-02

## 2020-01-10 ENCOUNTER — OFFICE VISIT (OUTPATIENT)
Dept: WOUND CARE | Facility: CLINIC | Age: 75
End: 2020-01-10
Payer: MEDICARE

## 2020-01-10 VITALS
WEIGHT: 260 LBS | TEMPERATURE: 98 F | DIASTOLIC BLOOD PRESSURE: 76 MMHG | HEIGHT: 67 IN | SYSTOLIC BLOOD PRESSURE: 152 MMHG | HEART RATE: 84 BPM | BODY MASS INDEX: 40.81 KG/M2

## 2020-01-10 DIAGNOSIS — L97.911 ULCER OF RIGHT LOWER EXTREMITY, LIMITED TO BREAKDOWN OF SKIN: ICD-10-CM

## 2020-01-10 DIAGNOSIS — I87.2 VENOUS INSUFFICIENCY OF BOTH LOWER EXTREMITIES: Chronic | ICD-10-CM

## 2020-01-10 DIAGNOSIS — I89.0 LYMPHEDEMA: ICD-10-CM

## 2020-01-10 DIAGNOSIS — L97.412 ULCER OF RIGHT HEEL AND MIDFOOT WITH FAT LAYER EXPOSED: Primary | ICD-10-CM

## 2020-01-10 PROCEDURE — 29580 PR STRAPPING UNNA BOOT: ICD-10-PCS | Mod: HCNC,RT,S$GLB, | Performed by: NURSE PRACTITIONER

## 2020-01-10 PROCEDURE — 99999 PR PBB SHADOW E&M-EST. PATIENT-LVL V: CPT | Mod: PBBFAC,HCNC,, | Performed by: NURSE PRACTITIONER

## 2020-01-10 PROCEDURE — 99999 PR PBB SHADOW E&M-EST. PATIENT-LVL V: ICD-10-PCS | Mod: PBBFAC,HCNC,, | Performed by: NURSE PRACTITIONER

## 2020-01-10 PROCEDURE — 29580 STRAPPING UNNA BOOT: CPT | Mod: HCNC,RT,S$GLB, | Performed by: NURSE PRACTITIONER

## 2020-01-10 PROCEDURE — 99499 UNLISTED E&M SERVICE: CPT | Mod: HCNC,S$GLB,, | Performed by: NURSE PRACTITIONER

## 2020-01-10 PROCEDURE — 99499 NO LOS: ICD-10-PCS | Mod: HCNC,S$GLB,, | Performed by: NURSE PRACTITIONER

## 2020-01-10 NOTE — PROGRESS NOTES
Subjective:       Patient ID: Sherin Ortiz is a 75 y.o. female.    Chief Complaint: Follow-up and Wound Check    Wound Check     Wound Check:   This patient is seen today for reevaluation of recurrent ulcers to the right foot and lower leg.  She reports this started at the end of October 2019, She was last seen in this clinic in August 2019.  We were informed by [facility] that her ulcers were healed on 8/22/19.  Home health continued to see her for 2 more weeks after that to make sure she did not have a recurrence.  She was supposed to purchase circaid compression stocking to keep the legs healed but she never did get them.  She has responded well to compression therapy in the past. She has home health services through [facility]. Problems that interfere with wound healing include multiple co-morbidities, diabetes, edema, diabetic neuropathy and  morbid obesity. Because of safety issues we are unable to weigh the patient as she is unstable on her feet.  The patient ambulates with great difficulty secondary to her body habitus and uses a motorized wheelchair which we cannot get on the scale to weigh her.  She does not complain of pain.  She denies increased swelling, redness or purulent drainage.   She is afebrile.  Review of Systems    Unchanged from previous visit.  Objective:      Physical Exam   Constitutional: She is oriented to person, place, and time. No distress.   Morbidly obese   HENT:   Head: Normocephalic and atraumatic.   Neck: Normal range of motion. Neck supple.   Pulmonary/Chest: Effort normal.   Musculoskeletal: Normal range of motion. She exhibits edema. She exhibits no tenderness.        Legs:       Feet:    Neurological: She is alert and oriented to person, place, and time. Abnormal coordination: .   Skin: Skin is warm and dry. Rash (dermatitis and tinea bilateral lower extremities) noted. She is not diaphoretic. No cyanosis or erythema. No pallor. Nails show no clubbing.   Psychiatric: She  has a normal mood and affect. Her behavior is normal. Judgment and thought content normal.   Nursing note and vitals reviewed.      Right medial heel    Right dorsal second toe    Left dorsal second toe        Hemoglobin A1C   Date Value Ref Range Status   11/06/2019 9.6 (H) 4.0 - 5.6 % Final     Comment:     ADA Screening Guidelines:  5.7-6.4%  Consistent with prediabetes  >or=6.5%  Consistent with diabetes  High levels of fetal hemoglobin interfere with the HbA1C  assay. Heterozygous hemoglobin variants (HbS, HgC, etc)do  not significantly interfere with this assay.   However, presence of multiple variants may affect accuracy.     03/22/2019 9.8 (H) 4.0 - 5.6 % Final     Comment:     ADA Screening Guidelines:  5.7-6.4%  Consistent with prediabetes  >or=6.5%  Consistent with diabetes  High levels of fetal hemoglobin interfere with the HbA1C  assay. Heterozygous hemoglobin variants (HbS, HgC, etc)do  not significantly interfere with this assay.   However, presence of multiple variants may affect accuracy.     08/13/2018 10.6 (H) 4.0 - 5.6 % Final     Comment:     ADA Screening Guidelines:  5.7-6.4%  Consistent with prediabetes  >or=6.5%  Consistent with diabetes  High levels of fetal hemoglobin interfere with the HbA1C  assay. Heterozygous hemoglobin variants (HbS, HgC, etc)do  not significantly interfere with this assay.   However, presence of multiple variants may affect accuracy.       ..  Lab Results   Component Value Date    ALBUMIN 3.3 (L) 11/06/2019     Sherin was seen in the clinic room and placed in the supine position on the treatment table.  right leg was cleansed with Easi-clense sponges and dried thoroughly.  A hydrofiber dressing was applied to the right heel wound.  Eucerin cream was applied to the lower legs.  The patient's foot was positioned at a 90 degree angle.  A zinc oxide wrap, followed by kerlix roll gauze and coban were applied using a spiral technique avoiding creases or folds.  The wrap was  started behind the first metatarsal and ended below the tibial tubercle of the knee.  There was overlap of each turn half the width of the previous turn.  The compression wrap will be changed every 3-4 days.    Assessment:       1. Ulcer of right heel and midfoot with fat layer exposed    2. Ulcer of right lower extremity, limited to breakdown of skin    3. Venous insufficiency of both lower extremities    4. Lymphedema        Plan:           Unna boot right lower leg as detailed above.  Pad left ankle with ABD pads.  Kerlix and coban left lower leg.  Aquacel to bilateral second toe ulcers, cover with cotton gauze and secure with coban.  Return to clinic in one month.  [facility] notified of orders via email. We will ask them to see the patient twice weekly.    Home Health Orders:    Triamcinolone cream to bilateral lower legs.  Ketoconazole cream to both feet.  Cleanse wounds with wound cleanser.  Pad ankles with ABD pads.  Apply hydrofiber to right medial heel ulcer.  Unna boot (zinc oxide, kerlix and coban) right lower leg.  Kerlix and coban left lower leg.    Apply hydrofiber to bilateral second toe ulcers, cover with cotton gauze and secure with coban.  Change dressings twice weekly.  Skilled nurse visit-2 x weekly

## 2020-01-18 ENCOUNTER — NURSE TRIAGE (OUTPATIENT)
Dept: ADMINISTRATIVE | Facility: CLINIC | Age: 75
End: 2020-01-18

## 2020-01-18 ENCOUNTER — HOSPITAL ENCOUNTER (EMERGENCY)
Facility: HOSPITAL | Age: 75
Discharge: HOME | End: 2020-01-19
Attending: EMERGENCY MEDICINE
Payer: MEDICARE

## 2020-01-18 DIAGNOSIS — N30.01 ACUTE CYSTITIS WITH HEMATURIA: Primary | ICD-10-CM

## 2020-01-18 LAB
ALBUMIN SERPL BCP-MCNC: 3.4 G/DL (ref 3.5–5.2)
ALP SERPL-CCNC: 163 U/L (ref 55–135)
ALT SERPL W/O P-5'-P-CCNC: 17 U/L (ref 10–44)
ANION GAP SERPL CALC-SCNC: 14 MMOL/L (ref 8–16)
APTT BLDCRRT: 24 SEC (ref 21–32)
AST SERPL-CCNC: 32 U/L (ref 10–40)
BACTERIA #/AREA URNS AUTO: ABNORMAL /HPF
BASOPHILS # BLD AUTO: 0.03 K/UL (ref 0–0.2)
BASOPHILS NFR BLD: 0.4 % (ref 0–1.9)
BILIRUB SERPL-MCNC: 0.7 MG/DL (ref 0.1–1)
BILIRUB UR QL STRIP: NEGATIVE
BUN SERPL-MCNC: 35 MG/DL (ref 8–23)
CALCIUM SERPL-MCNC: 9.8 MG/DL (ref 8.7–10.5)
CHLORIDE SERPL-SCNC: 101 MMOL/L (ref 95–110)
CLARITY UR REFRACT.AUTO: ABNORMAL
CO2 SERPL-SCNC: 19 MMOL/L (ref 23–29)
COLOR UR AUTO: YELLOW
CREAT SERPL-MCNC: 1.7 MG/DL (ref 0.5–1.4)
DIFFERENTIAL METHOD: ABNORMAL
EOSINOPHIL # BLD AUTO: 0 K/UL (ref 0–0.5)
EOSINOPHIL NFR BLD: 0.4 % (ref 0–8)
ERYTHROCYTE [DISTWIDTH] IN BLOOD BY AUTOMATED COUNT: 12.8 % (ref 11.5–14.5)
EST. GFR  (AFRICAN AMERICAN): 33.1 ML/MIN/1.73 M^2
EST. GFR  (NON AFRICAN AMERICAN): 28.7 ML/MIN/1.73 M^2
GLUCOSE SERPL-MCNC: 177 MG/DL (ref 70–110)
GLUCOSE UR QL STRIP: ABNORMAL
HCT VFR BLD AUTO: 39.5 % (ref 37–48.5)
HGB BLD-MCNC: 12.5 G/DL (ref 12–16)
HGB UR QL STRIP: ABNORMAL
HYALINE CASTS UR QL AUTO: 0 /LPF
IMM GRANULOCYTES # BLD AUTO: 0.03 K/UL (ref 0–0.04)
IMM GRANULOCYTES NFR BLD AUTO: 0.4 % (ref 0–0.5)
INR PPP: 1.1 (ref 0.8–1.2)
KETONES UR QL STRIP: ABNORMAL
LEUKOCYTE ESTERASE UR QL STRIP: ABNORMAL
LYMPHOCYTES # BLD AUTO: 0.8 K/UL (ref 1–4.8)
LYMPHOCYTES NFR BLD: 10.4 % (ref 18–48)
MCH RBC QN AUTO: 28.8 PG (ref 27–31)
MCHC RBC AUTO-ENTMCNC: 31.6 G/DL (ref 32–36)
MCV RBC AUTO: 91 FL (ref 82–98)
MICROSCOPIC COMMENT: ABNORMAL
MONOCYTES # BLD AUTO: 0.4 K/UL (ref 0.3–1)
MONOCYTES NFR BLD: 5.4 % (ref 4–15)
NEUTROPHILS # BLD AUTO: 6.6 K/UL (ref 1.8–7.7)
NEUTROPHILS NFR BLD: 83 % (ref 38–73)
NITRITE UR QL STRIP: POSITIVE
NRBC BLD-RTO: 0 /100 WBC
PH UR STRIP: 5 [PH] (ref 5–8)
PLATELET # BLD AUTO: 235 K/UL (ref 150–350)
PMV BLD AUTO: 9 FL (ref 9.2–12.9)
POTASSIUM SERPL-SCNC: 5.5 MMOL/L (ref 3.5–5.1)
PROT SERPL-MCNC: 8.8 G/DL (ref 6–8.4)
PROT UR QL STRIP: ABNORMAL
PROTHROMBIN TIME: 10.8 SEC (ref 9–12.5)
RBC # BLD AUTO: 4.34 M/UL (ref 4–5.4)
RBC #/AREA URNS AUTO: 47 /HPF (ref 0–4)
SODIUM SERPL-SCNC: 134 MMOL/L (ref 136–145)
SP GR UR STRIP: 1.01 (ref 1–1.03)
SQUAMOUS #/AREA URNS AUTO: 4 /HPF
URN SPEC COLLECT METH UR: ABNORMAL
WBC # BLD AUTO: 7.89 K/UL (ref 3.9–12.7)
WBC #/AREA URNS AUTO: 97 /HPF (ref 0–5)
WBC CLUMPS UR QL AUTO: ABNORMAL

## 2020-01-18 PROCEDURE — 96374 THER/PROPH/DIAG INJ IV PUSH: CPT | Mod: HCNC,59

## 2020-01-18 PROCEDURE — 85610 PROTHROMBIN TIME: CPT | Mod: HCNC

## 2020-01-18 PROCEDURE — 85730 THROMBOPLASTIN TIME PARTIAL: CPT | Mod: HCNC

## 2020-01-18 PROCEDURE — 87077 CULTURE AEROBIC IDENTIFY: CPT | Mod: HCNC

## 2020-01-18 PROCEDURE — 80053 COMPREHEN METABOLIC PANEL: CPT | Mod: HCNC

## 2020-01-18 PROCEDURE — 87088 URINE BACTERIA CULTURE: CPT | Mod: HCNC

## 2020-01-18 PROCEDURE — 85025 COMPLETE CBC W/AUTO DIFF WBC: CPT | Mod: HCNC

## 2020-01-18 PROCEDURE — 99284 PR EMERGENCY DEPT VISIT,LEVEL IV: ICD-10-PCS | Mod: HCNC,,, | Performed by: EMERGENCY MEDICINE

## 2020-01-18 PROCEDURE — 51701 INSERT BLADDER CATHETER: CPT | Mod: HCNC

## 2020-01-18 PROCEDURE — 63600175 PHARM REV CODE 636 W HCPCS: Mod: HCNC | Performed by: EMERGENCY MEDICINE

## 2020-01-18 PROCEDURE — 99284 EMERGENCY DEPT VISIT MOD MDM: CPT | Mod: HCNC,,, | Performed by: EMERGENCY MEDICINE

## 2020-01-18 PROCEDURE — 99284 EMERGENCY DEPT VISIT MOD MDM: CPT | Mod: 25,HCNC

## 2020-01-18 PROCEDURE — 81001 URINALYSIS AUTO W/SCOPE: CPT | Mod: HCNC

## 2020-01-18 PROCEDURE — 87186 SC STD MICRODIL/AGAR DIL: CPT | Mod: HCNC

## 2020-01-18 PROCEDURE — 87086 URINE CULTURE/COLONY COUNT: CPT | Mod: HCNC

## 2020-01-18 RX ORDER — CEFTRIAXONE 1 G/1
1 INJECTION, POWDER, FOR SOLUTION INTRAMUSCULAR; INTRAVENOUS
Status: COMPLETED | OUTPATIENT
Start: 2020-01-18 | End: 2020-01-18

## 2020-01-18 RX ADMIN — CEFTRIAXONE SODIUM 1 G: 1 INJECTION, POWDER, FOR SOLUTION INTRAMUSCULAR; INTRAVENOUS at 10:01

## 2020-01-18 RX ADMIN — SODIUM CHLORIDE 1000 ML: 0.9 INJECTION, SOLUTION INTRAVENOUS at 10:01

## 2020-01-18 NOTE — TELEPHONE ENCOUNTER
Daughter calling, states pt has hematuria. Pt is on Plavix. Per triage protocol and nursing judgment, pt advised to see MD within 4 hours. Daughter states due to pt's mobility and weight, they will go to ED.    Reason for Disposition   Taking Coumadin (warfarin) or other strong blood thinner, or known bleeding disorder (e.g., thrombocytopenia)    Additional Information   Negative: Shock suspected (e.g., cold/pale/clammy skin, too weak to stand, low BP, rapid pulse)   Negative: Sounds like a life-threatening emergency to the triager   Negative: Urinary catheter, questions about   Negative: Recent back or abdominal injury   Negative: Recent genital injury   Negative: [1] Unable to urinate (or only a few drops) > 4 hours AND [2] bladder feels very full (e.g., palpable bladder or strong urge to urinate)   Negative: Passing pure blood or large blood clots (i.e., size > a dime) (Exception: gifty or small strands)   Negative: Fever > 100.5 F (38.1 C)   Negative: Patient sounds very sick or weak to the triager   Negative: Known sickle cell disease    Protocols used: URINE - BLOOD IN-A-

## 2020-01-19 VITALS
DIASTOLIC BLOOD PRESSURE: 77 MMHG | HEIGHT: 67 IN | BODY MASS INDEX: 40.81 KG/M2 | RESPIRATION RATE: 16 BRPM | WEIGHT: 260 LBS | SYSTOLIC BLOOD PRESSURE: 183 MMHG | OXYGEN SATURATION: 96 % | TEMPERATURE: 98 F | HEART RATE: 99 BPM

## 2020-01-19 LAB
BUN SERPL-MCNC: 33 MG/DL (ref 6–30)
CHLORIDE SERPL-SCNC: 102 MMOL/L (ref 95–110)
CREAT SERPL-MCNC: 1.3 MG/DL (ref 0.5–1.4)
GLUCOSE SERPL-MCNC: 192 MG/DL (ref 70–110)
HCT VFR BLD CALC: 41 %PCV (ref 36–54)
POC IONIZED CALCIUM: 1.21 MMOL/L (ref 1.06–1.42)
POC TCO2 (MEASURED): 27 MMOL/L (ref 23–29)
POTASSIUM BLD-SCNC: 4.6 MMOL/L (ref 3.5–5.1)
SAMPLE: ABNORMAL
SODIUM BLD-SCNC: 137 MMOL/L (ref 136–145)

## 2020-01-19 RX ORDER — CEPHALEXIN 500 MG/1
500 CAPSULE ORAL EVERY 6 HOURS
Qty: 40 CAPSULE | Refills: 0 | Status: SHIPPED | OUTPATIENT
Start: 2020-01-19 | End: 2020-01-29

## 2020-01-19 NOTE — ED PROVIDER NOTES
"CC: Hematuria (blood in my urine, on plavix)   SCRIBE #1 NOTE: I, Annette Hinds, am scribing for, and in the presence of, Ivonne Clinton MD. Other sections scribed: HPI, ROS, PE, MDM.           History provided by:   Patient   Family  Medical Records    HPI: Sherin Ortiz is a 75 y.o. year old female , with a PMHx of nephrolithiasis, type II DM, and a history of TIA, who presents to the ED complaining of hematuria that started earlier this week. Patient describes her urine as initially "brownish" but her urine was "purpleish" today.  Pt states that she has no problem emptying her bladder. Patient normally urinates throughout the today, but, since her symptoms has started, she has only been able to urinate once or twice a day. Patient reports that she has a cold. Patient is on Plavix and ASA. Patient is allowed to take two Tylenol pills for her right arm. Patient has regular UTIs and kidney infections. Patient is wheelchair bound. Patient denies clots in her urine, abdominal pain, pain when urinating, and no recent weight loss.       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    Benign essential hypertension 11/14/2012    Cataract     s/p phacoemulsification    Chronic kidney disease (CKD), stage III (moderate) 9/12/2013    Diabetic peripheral neuropathy associated with type 2 diabetes mellitus 11/14/2014    causing right hemiparesis    Gait disorder     Hyperlipidemia     Iritis - Both Eyes 6/10/2013    Kidney stone     Lymphedema     Morbid obesity with BMI of 40.0-44.9, adult 2/18/2015    Nephrolithiasis 4/20/2016    NS (nuclear sclerosis) 4/1/2013    Nuclear sclerosis - Both Eyes 4/29/2013    Preseptal cellulitis - Right Eye 4/29/2013    Proliferative diabetic retinopathy - Both Eyes 4/29/2013    Proliferative diabetic retinopathy, both eyes 4/1/2013    PSC (posterior subcapsular cataract) - Both Eyes 4/29/2013    S/P hernia repair " 12/19/2012    TIA (transient ischemic attack) 11/18/2014    Tinea pedis 7/24/2012    Tinea pedis is present on both feet.    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 Kat Bower     Family History   Problem Relation Age of Onset    Diabetes Sister     Cataracts Sister     Heart disease Brother     Cataracts Brother     Leukemia Mother     Cancer Neg Hx     Amblyopia Neg Hx     Blindness Neg Hx     Glaucoma Neg Hx     Hypertension Neg Hx     Macular degeneration Neg Hx     Retinal detachment Neg Hx     Strabismus Neg Hx     Stroke Neg Hx     Thyroid disease Neg Hx     Kidney disease Neg Hx      No current facility-administered medications on file prior to encounter.      Current Outpatient Medications on File Prior to Encounter   Medication Sig Dispense Refill    0.9 % sodium chloride (SODIUM CHLORIDE 0.9%) solution       ACCU-CHEK RAMIRO PLUS TEST STRP Strp TEST TWICE  "DAILY 200 strip 3    ACCU-CHEK SOFTCLIX LANCETS Misc Test twice daily      aspirin 81 MG Chew Take 81 mg by mouth once daily.        atorvastatin (LIPITOR) 40 MG tablet TAKE 1 TABLET EVERY EVENING 90 tablet 3    BD INSULIN SYRINGE ULTRA-FINE 0.5 mL 30 gauge x 1/2" Syrg USE TO INJECT EVERY NIGHT 90 each 3    blood-glucose meter Misc Dispense one meter: Insurance Brand Preference Accu-Chek 1 each 0    bumetanide (BUMEX) 1 MG tablet Take 2 tablets (2 mg total) by mouth once daily. 180 tablet 3    clopidogrel (PLAVIX) 75 mg tablet TAKE 1 TABLET EVERY DAY 90 tablet 3    glimepiride (AMARYL) 1 MG tablet Take 2 tablets (2 mg total) by mouth once daily. 180 tablet 3    ketoconazole (NIZORAL) 2 % cream LEONID EXT AA QD  1    LANTUS U-100 INSULIN 100 unit/mL injection INJECT 7 TO 10 UNITS SUBCUTANEOUSLY IN THE EVENING DEPENDING ON SCALE (DISCARD EACH VIAL AFTER 28 DAYS) (Patient taking differently: INJECT 30-35 UNITS SUBCUTANEOUSLY IN THE EVENING DEPENDING ON SCALE (DISCARD EACH VIAL AFTER 28 DAYS)) 30 mL 3    miconazole nitrate 2% (MICOTIN) 2 % Oint Apply topically 2 (two) times daily.  0    triamcinolone acetonide 0.1% (KENALOG) 0.1 % cream APPLY TOPICALLY TWICE DAILY 454 g 0     Penicillins and Sulfa (sulfonamide antibiotics)  Social History     Socioeconomic History    Marital status:      Spouse name: Not on file    Number of children: Not on file    Years of education: Not on file    Highest education level: Not on file   Occupational History    Not on file   Social Needs    Financial resource strain: Not on file    Food insecurity:     Worry: Not on file     Inability: Not on file    Transportation needs:     Medical: Not on file     Non-medical: Not on file   Tobacco Use    Smoking status: Former Smoker     Packs/day: 0.50     Years: 15.00     Pack years: 7.50     Types: Cigarettes     Last attempt to quit: 1982     Years since quittin.5    Smokeless tobacco: Former User    Tobacco " comment: smoked one pack per week   Substance and Sexual Activity    Alcohol use: No    Drug use: No    Sexual activity: Not Currently   Lifestyle    Physical activity:     Days per week: Not on file     Minutes per session: Not on file    Stress: Not on file   Relationships    Social connections:     Talks on phone: Not on file     Gets together: Not on file     Attends Rastafari service: Not on file     Active member of club or organization: Not on file     Attends meetings of clubs or organizations: Not on file     Relationship status: Not on file   Other Topics Concern    Not on file   Social History Narrative    Not on file       ROS:     Constitutional : neg for fever, neg for weakness, no recent weight loss  HENT neg for head injury, neg for sore throat  Eyes: neg for visual changes, neg for eye pain  Resp neg for SOB, neg for cough  Cardiac  neg for chest pain, neg for palpitations  GI neg for abd pain, neg for nausea, neg for vomiting   pos for hematuria, no pain when urinating, no clots in urine  Neuro neg for focal weakness or numbness  Skin neg for skin rash  MSK: neg for joint pain, neg for joint swelling  ALL: Penicillins and Sulfa (sulfonamide antibiotics)    PHYSICAL EXAM:  Vitals:    01/18/20 1757   BP: (!) 141/81   Pulse: 82   Resp: 16   Temp: 97.8 °F (36.6 °C)         PHYSICAL EXAM:     general: comfortable, in no acute distress, pleasant  VS: triage VS reviewed  HENT: NC/AT  Eyes: PERRL, EOMI  CV: RRR, no  murmurs, no rubs, no gallops, no LE edema  Resp: comfortable breathing, speaks in full sentences, CTAB, no wheezing, no crackles, no ronchi  ABD: obese, soft, ND, + normal BS, NT  Renal: No CVAT  Neuro: AAO x 3, face symmetric, speech normal  MSK: no deformity, no edema            DATA & INTERVENTIONS:    LABS reviewed:  Labs Reviewed   COMPREHENSIVE METABOLIC PANEL   CBC W/ AUTO DIFFERENTIAL   URINALYSIS, REFLEX TO URINE CULTURE   APTT   PROTIME-INR       RADIOLOGY reviewed:  Imaging  Results    None         MEDICATIONS/FLUIDS:  Medications - No data to display      MDM:  Sherin Ortiz is a 75 y.o. year old female who presents to the ED complaining of decreased urination and dark appearing urine. Patient is concerned her urine may be bloody. Patient is able to void, no clots in the urine. Patient denies abdominal and flank pain. Patient clinically has no symptoms of pyelo or kidney stone. I will check UA to rule out hematuria vs UTI versus hyper-concentrated urine and decide further workup based on results.   UA pos nitrites, +3 leuks, + 3 blood. Cr 1.7 elevated from baseline 1.3  Will give Ceftriaxone and fluids. Will rpt istat after IVF  Rpt Istat w/ Cr. 1.3  patient discharged on Kelfex   Advised patient to follow-up with PCP.    DDX includes but not limited to: hematuria vs UTI vs hyper-concentrated urine vs erasto    Labs ordered and reviewed: UA. +Nitrate. +3 leukocytes and blood. Creatinine of 1.7, an increase from 1.3. Normal white count, hemoglobin, and platelets. Normal INR and PTT.   Repeat Creatinine was 1.3.  Medication given in the ED: ceftriaxone, IVF    The patient has been carefully educated about symptoms and conditions that should prompt immediate return to the ED for recheck or further evaluation. Told to return immediately for any new or worsening or progressive symptoms    IMPRESSION:  1.) UTI w/ hematuria  2.) dehydration    Dispo: Discharge  Critical Care Time: N/A       Ivonne Clinton MD  01/19/20 7598

## 2020-01-19 NOTE — ED TRIAGE NOTES
"Patient reports x1 week of brown urine that has turned "more bloody." Denies clots. Patient reports decrease in urine output. Denies fevers, chills, lightheaded, weakness, sob, cp. Patient uses electric wheelchair at home. Patient takes Plavix.   "

## 2020-01-21 LAB — BACTERIA UR CULT: ABNORMAL

## 2020-02-02 ENCOUNTER — HOSPITAL ENCOUNTER (EMERGENCY)
Facility: HOSPITAL | Age: 75
Discharge: HOME | End: 2020-02-02
Attending: EMERGENCY MEDICINE
Payer: MEDICARE

## 2020-02-02 VITALS
SYSTOLIC BLOOD PRESSURE: 124 MMHG | TEMPERATURE: 98 F | BODY MASS INDEX: 40.81 KG/M2 | HEART RATE: 94 BPM | HEIGHT: 67 IN | DIASTOLIC BLOOD PRESSURE: 76 MMHG | RESPIRATION RATE: 18 BRPM | WEIGHT: 260 LBS | OXYGEN SATURATION: 99 %

## 2020-02-02 DIAGNOSIS — R10.9 FLANK PAIN: Primary | ICD-10-CM

## 2020-02-02 DIAGNOSIS — N23 RENAL COLIC ON LEFT SIDE: ICD-10-CM

## 2020-02-02 LAB
ALBUMIN SERPL BCP-MCNC: 2.9 G/DL (ref 3.5–5.2)
ALP SERPL-CCNC: 157 U/L (ref 55–135)
ALT SERPL W/O P-5'-P-CCNC: 19 U/L (ref 10–44)
ANION GAP SERPL CALC-SCNC: 12 MMOL/L (ref 8–16)
AST SERPL-CCNC: 21 U/L (ref 10–40)
BACTERIA #/AREA URNS AUTO: ABNORMAL /HPF
BASOPHILS # BLD AUTO: 0.03 K/UL (ref 0–0.2)
BASOPHILS NFR BLD: 0.5 % (ref 0–1.9)
BILIRUB SERPL-MCNC: 0.4 MG/DL (ref 0.1–1)
BILIRUB UR QL STRIP: NEGATIVE
BUN SERPL-MCNC: 40 MG/DL (ref 8–23)
CALCIUM SERPL-MCNC: 9.3 MG/DL (ref 8.7–10.5)
CHLORIDE SERPL-SCNC: 108 MMOL/L (ref 95–110)
CLARITY UR REFRACT.AUTO: CLEAR
CO2 SERPL-SCNC: 19 MMOL/L (ref 23–29)
COLOR UR AUTO: COLORLESS
CREAT SERPL-MCNC: 1.4 MG/DL (ref 0.5–1.4)
DIFFERENTIAL METHOD: NORMAL
EOSINOPHIL # BLD AUTO: 0 K/UL (ref 0–0.5)
EOSINOPHIL NFR BLD: 0.6 % (ref 0–8)
ERYTHROCYTE [DISTWIDTH] IN BLOOD BY AUTOMATED COUNT: 13.1 % (ref 11.5–14.5)
EST. GFR  (AFRICAN AMERICAN): 41.8 ML/MIN/1.73 M^2
EST. GFR  (NON AFRICAN AMERICAN): 36.3 ML/MIN/1.73 M^2
GLUCOSE SERPL-MCNC: 138 MG/DL (ref 70–110)
GLUCOSE UR QL STRIP: ABNORMAL
HCT VFR BLD AUTO: 38.3 % (ref 37–48.5)
HGB BLD-MCNC: 12.6 G/DL (ref 12–16)
HGB UR QL STRIP: ABNORMAL
IMM GRANULOCYTES # BLD AUTO: 0.02 K/UL (ref 0–0.04)
IMM GRANULOCYTES NFR BLD AUTO: 0.3 % (ref 0–0.5)
KETONES UR QL STRIP: NEGATIVE
LEUKOCYTE ESTERASE UR QL STRIP: NEGATIVE
LYMPHOCYTES # BLD AUTO: 1.2 K/UL (ref 1–4.8)
LYMPHOCYTES NFR BLD: 19.6 % (ref 18–48)
MCH RBC QN AUTO: 29.9 PG (ref 27–31)
MCHC RBC AUTO-ENTMCNC: 32.9 G/DL (ref 32–36)
MCV RBC AUTO: 91 FL (ref 82–98)
MICROSCOPIC COMMENT: ABNORMAL
MONOCYTES # BLD AUTO: 0.4 K/UL (ref 0.3–1)
MONOCYTES NFR BLD: 6 % (ref 4–15)
NEUTROPHILS # BLD AUTO: 4.5 K/UL (ref 1.8–7.7)
NEUTROPHILS NFR BLD: 73 % (ref 38–73)
NITRITE UR QL STRIP: NEGATIVE
NRBC BLD-RTO: 0 /100 WBC
PH UR STRIP: 7 [PH] (ref 5–8)
PLATELET # BLD AUTO: 229 K/UL (ref 150–350)
PMV BLD AUTO: 9.4 FL (ref 9.2–12.9)
POTASSIUM SERPL-SCNC: 4.2 MMOL/L (ref 3.5–5.1)
PROT SERPL-MCNC: 7.4 G/DL (ref 6–8.4)
PROT UR QL STRIP: NEGATIVE
RBC # BLD AUTO: 4.22 M/UL (ref 4–5.4)
RBC #/AREA URNS AUTO: 3 /HPF (ref 0–4)
SODIUM SERPL-SCNC: 139 MMOL/L (ref 136–145)
SP GR UR STRIP: 1 (ref 1–1.03)
SQUAMOUS #/AREA URNS AUTO: 0 /HPF
URN SPEC COLLECT METH UR: ABNORMAL
WBC # BLD AUTO: 6.16 K/UL (ref 3.9–12.7)
WBC #/AREA URNS AUTO: 5 /HPF (ref 0–5)
YEAST UR QL AUTO: ABNORMAL

## 2020-02-02 PROCEDURE — 99284 PR EMERGENCY DEPT VISIT,LEVEL IV: ICD-10-PCS | Mod: ,,, | Performed by: EMERGENCY MEDICINE

## 2020-02-02 PROCEDURE — 85025 COMPLETE CBC W/AUTO DIFF WBC: CPT | Mod: HCNC

## 2020-02-02 PROCEDURE — 99284 EMERGENCY DEPT VISIT MOD MDM: CPT | Mod: 25,HCNC

## 2020-02-02 PROCEDURE — 96375 TX/PRO/DX INJ NEW DRUG ADDON: CPT | Mod: HCNC

## 2020-02-02 PROCEDURE — 96374 THER/PROPH/DIAG INJ IV PUSH: CPT | Mod: HCNC

## 2020-02-02 PROCEDURE — 80053 COMPREHEN METABOLIC PANEL: CPT | Mod: HCNC

## 2020-02-02 PROCEDURE — 99284 EMERGENCY DEPT VISIT MOD MDM: CPT | Mod: ,,, | Performed by: EMERGENCY MEDICINE

## 2020-02-02 PROCEDURE — 81001 URINALYSIS AUTO W/SCOPE: CPT | Mod: HCNC

## 2020-02-02 PROCEDURE — 63600175 PHARM REV CODE 636 W HCPCS: Mod: HCNC | Performed by: EMERGENCY MEDICINE

## 2020-02-02 RX ORDER — KETOROLAC TROMETHAMINE 30 MG/ML
10 INJECTION, SOLUTION INTRAMUSCULAR; INTRAVENOUS
Status: COMPLETED | OUTPATIENT
Start: 2020-02-02 | End: 2020-02-02

## 2020-02-02 RX ORDER — MORPHINE SULFATE 4 MG/ML
4 INJECTION, SOLUTION INTRAMUSCULAR; INTRAVENOUS ONCE AS NEEDED
Status: COMPLETED | OUTPATIENT
Start: 2020-02-02 | End: 2020-02-02

## 2020-02-02 RX ORDER — ONDANSETRON 2 MG/ML
4 INJECTION INTRAMUSCULAR; INTRAVENOUS
Status: COMPLETED | OUTPATIENT
Start: 2020-02-02 | End: 2020-02-02

## 2020-02-02 RX ADMIN — MORPHINE SULFATE 4 MG: 4 INJECTION, SOLUTION INTRAMUSCULAR; INTRAVENOUS at 11:02

## 2020-02-02 RX ADMIN — KETOROLAC TROMETHAMINE 10 MG: 30 INJECTION, SOLUTION INTRAMUSCULAR; INTRAVENOUS at 11:02

## 2020-02-02 RX ADMIN — SODIUM CHLORIDE 1000 ML: 0.9 INJECTION, SOLUTION INTRAVENOUS at 11:02

## 2020-02-02 RX ADMIN — ONDANSETRON 4 MG: 2 INJECTION INTRAMUSCULAR; INTRAVENOUS at 11:02

## 2020-02-02 NOTE — ED NOTES
Patient identifiers verified and correct for Sherin Ortiz.    LOC: The patient is awake, alert and oriented x 4. Patient visibly uncomfortable, moaning on arrival and holding left flank        SKIN: The skin is warm and dry, color consistent with ethnicity, patient has normal skin turgor and moist mucus membranes, skin intact, no breakdown or bruising noted.    MUSCULOSKELETAL: Patient moving all extremities spontaneously, BLE edema, legs wrapped + pedal pulses bilateral ( patient has a home care nurse- for wound care)       RESPIRATORY: Airway is open and patent, respirations are spontaneous, patient has a normal effort and rate, no accessory muscle use noted, bilateral breath sounds clear    CARDIAC: Patient has a normal rate and regular rhythm, BLE edema  capillary refill < 3 seconds.    ABDOMEN: left flank tender to palpation, no distention noted, normoactive bowel sounds present in all four quadrants.  NEUROLOGIC: PERRLA, 3 mm bilaterally, eyes open spontaneously, behavior appropriate to situation, follows commands, facial expression symmetrical, bilateral hand grasp equal and even, purposeful motor response noted, normal sensation in all extremities when touched with a finger.

## 2020-02-02 NOTE — ED NOTES
Patient resting more comfortable than arrival     Warm pack applied to left flank with some relief as well

## 2020-02-02 NOTE — ED TRIAGE NOTES
Sherin Ortiz, a 75 y.o. female presents to the ED w/ complaint of left flank pain and low back pain which began this morning, It was a gradual onset and then became worse prompting an ER visit. The pain is located along the left flank, radiating towards the abdomen. The pain is described as sharp, worse with pressure, nothing makes the pain better    Patient denies chest pain and shortness of breath    Patient denies pelvic pain     Patient denies fevers and chills     Triage note:  Chief Complaint   Patient presents with    Flank Pain     arrived via  EMS from home with c/o flank pain onset this moring, just completed abx for UTI     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    Benign essential hypertension 11/14/2012    Cataract     s/p phacoemulsification    Chronic kidney disease (CKD), stage III (moderate) 9/12/2013    Diabetic peripheral neuropathy associated with type 2 diabetes mellitus 11/14/2014    causing right hemiparesis    Gait disorder     Hyperlipidemia     Iritis - Both Eyes 6/10/2013    Kidney stone     Lymphedema     Morbid obesity with BMI of 40.0-44.9, adult 2/18/2015    Nephrolithiasis 4/20/2016    NS (nuclear sclerosis) 4/1/2013    Nuclear sclerosis - Both Eyes 4/29/2013    Preseptal cellulitis - Right Eye 4/29/2013    Proliferative diabetic retinopathy - Both Eyes 4/29/2013    Proliferative diabetic retinopathy, both eyes 4/1/2013    PSC (posterior subcapsular cataract) - Both Eyes 4/29/2013    S/P hernia repair 12/19/2012    TIA (transient  ischemic attack) 11/18/2014    Tinea pedis 7/24/2012    Tinea pedis is present on both feet.    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

## 2020-02-02 NOTE — ED PROVIDER NOTES
Encounter Date: 2/2/2020       History     Chief Complaint   Patient presents with    Flank Pain     arrived via  EMS from home with c/o flank pain onset this moring, just completed abx for UTI     The patient complains of acute, severe left flank pain radiating to her left groin that began suddenly this morning.  It is associated with nausea but no vomiting. She denies fevers or chills. She denies hematuria.  She denies dysuria.  Of note, the patient recently completed a course of doxycycline for a urinary tract infection diagnosed on January 18th.  She reports similar episodes in the past when she had a kidney stone.  She denies chest pain or shortness of breath.    The history is provided by the patient.     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    Benign essential hypertension 11/14/2012    Cataract     s/p phacoemulsification    Chronic kidney disease (CKD), stage III (moderate) 9/12/2013    Diabetic peripheral neuropathy associated with type 2 diabetes mellitus 11/14/2014    causing right hemiparesis    Gait disorder     Hyperlipidemia     Iritis - Both Eyes 6/10/2013    Kidney stone     Lymphedema     Morbid obesity with BMI of 40.0-44.9, adult 2/18/2015    Nephrolithiasis 4/20/2016    NS (nuclear sclerosis) 4/1/2013    Nuclear sclerosis - Both Eyes 4/29/2013    Preseptal cellulitis - Right Eye 4/29/2013    Proliferative diabetic retinopathy - Both Eyes 4/29/2013    Proliferative diabetic retinopathy, both eyes 4/1/2013    PSC (posterior subcapsular cataract) -  Both Eyes 2013    S/P hernia repair 2012    TIA (transient ischemic attack) 2014    Tinea pedis 2012    Tinea pedis is present on both feet.     Type 2 diabetes mellitus with renal manifestations, controlled 2013    Type 2 diabetes, controlled, with moderate nonproliferative diabetic retinopathy without macular edema 2015    Ulcer of left lower extremity, limited to breakdown of skin 2015    Unspecified cerebral artery occlusion with cerebral infarction 2014    Unspecified venous (peripheral) insufficiency     Ureteral stone with hydronephrosis 2016    UTI (lower urinary tract infection)     Vaginal infection     Vertical heterotropia - Both Eyes 2013     Past Surgical History:   Procedure Laterality Date    APPENDECTOMY      CATARACT EXTRACTION W/  INTRAOCULAR LENS IMPLANT Left 2013    CATARACT EXTRACTION W/  INTRAOCULAR LENS IMPLANT Right 2013    CHOLECYSTECTOMY      COLONOSCOPY  2005    normal    ESOPHAGOGASTRODUODENOSCOPY  2015    hiatal hernia, Schatzki ring    EYE SURGERY Bilateral     laser surgery both eyes    NASAL SEPTUM SURGERY      SUBTOTAL COLECTOMY  2012    transverse colon, for incarcerated umbilical hernia, Dr. Kat Bower     Family History   Problem Relation Age of Onset    Diabetes Sister     Cataracts Sister     Heart disease Brother     Cataracts Brother     Leukemia Mother     Cancer Neg Hx     Amblyopia Neg Hx     Blindness Neg Hx     Glaucoma Neg Hx     Hypertension Neg Hx     Macular degeneration Neg Hx     Retinal detachment Neg Hx     Strabismus Neg Hx     Stroke Neg Hx     Thyroid disease Neg Hx     Kidney disease Neg Hx      Social History     Tobacco Use    Smoking status: Former Smoker     Packs/day: 0.50     Years: 15.00     Pack years: 7.50     Types: Cigarettes     Last attempt to quit: 1982     Years since quittin.5    Smokeless tobacco: Former  User    Tobacco comment: smoked one pack per week   Substance Use Topics    Alcohol use: No    Drug use: No     Review of Systems   Constitutional: Negative for chills and fever.   Respiratory: Negative for shortness of breath.    Cardiovascular: Negative for chest pain.   Gastrointestinal: Positive for nausea. Negative for abdominal pain and vomiting.   Genitourinary: Positive for flank pain. Negative for dysuria, frequency and hematuria.   Musculoskeletal: Positive for back pain.   Skin: Negative for rash.   Neurological: Negative for weakness.   All other systems reviewed and are negative.      Physical Exam     Initial Vitals [02/02/20 1105]   BP Pulse Resp Temp SpO2   (!) 190/93 85 20 98.4 °F (36.9 °C) 100 %      MAP       --         Physical Exam    Nursing note and vitals reviewed.  Constitutional: She appears well-developed and well-nourished. She is not diaphoretic. She appears distressed.   The patient appears to be in pain.   HENT:   Mouth/Throat: Oropharynx is clear and moist.   Eyes: EOM are normal. Pupils are equal, round, and reactive to light.   Neck: Normal range of motion.   Cardiovascular: Normal rate, regular rhythm, normal heart sounds and intact distal pulses.   Distal pulses are symmetric.   Pulmonary/Chest: Breath sounds normal. No respiratory distress. She has no wheezes. She has no rhonchi. She has no rales.   Abdominal: Soft. Bowel sounds are normal. She exhibits no distension. There is no tenderness. There is no rebound and no guarding.   Musculoskeletal: Normal range of motion.   Neurological: She is alert and oriented to person, place, and time. She has normal strength. No cranial nerve deficit or sensory deficit.   Skin: Skin is warm and dry. Capillary refill takes less than 2 seconds. No rash noted. No erythema.   Psychiatric: She has a normal mood and affect.         ED Course   Procedures  Labs Reviewed   COMPREHENSIVE METABOLIC PANEL - Abnormal; Notable for the following  components:       Result Value    CO2 19 (*)     Glucose 138 (*)     BUN, Bld 40 (*)     Albumin 2.9 (*)     Alkaline Phosphatase 157 (*)     eGFR if  41.8 (*)     eGFR if non  36.3 (*)     All other components within normal limits   URINALYSIS, REFLEX TO URINE CULTURE - Abnormal; Notable for the following components:    Color, UA Colorless (*)     Glucose, UA 3+ (*)     Occult Blood UA 1+ (*)     All other components within normal limits    Narrative:     Preferred Collection Type->Urine, Clean Catch   URINALYSIS MICROSCOPIC - Abnormal; Notable for the following components:    Yeast, UA Few (*)     All other components within normal limits    Narrative:     Preferred Collection Type->Urine, Clean Catch   CBC W/ AUTO DIFFERENTIAL          Imaging Results           CT Renal Stone Study ABD Pelvis WO (Final result)  Result time 02/02/20 12:29:22    Final result by Kim Nunez MD (02/02/20 12:29:22)                 Impression:      Enlarged left kidney with mild hydronephrosis and hydroureter and surrounding inflammatory changes concerning for pyelonephritis.  Follow-up recommended to ensure resolution of mildly distended collecting system, no obstructive or forming stones identified.    Stable 4.6 cm simple right ovarian cyst.    Benign left adrenal adenoma.    Other findings as above.    This report was flagged in Epic as abnormal.    COMMUNICATION  This critical result was discovered/received at 12:10. The critical information above was relayed directly by Nirav Leigh MD by telephone to Kenney Villeda MD on 02/02/2020 at 12:15.    Electronically signed by resident: Nirav Leigh  Date:    02/02/2020  Time:    12:01    Electronically signed by: Kim Nunez MD  Date:    02/02/2020  Time:    12:29             Narrative:    EXAMINATION:  CT RENAL STONE STUDY ABD PELVIS WO    CLINICAL HISTORY:  Hematuria;    TECHNIQUE:  Low dose axial images, sagittal and  coronal reformations were obtained from the lung bases to the pubic symphysis.  Contrast was not administered.    COMPARISON:  CT renal stone 12/13/2020    CT abdomen pelvis 12/12/2012    Ultrasound retroperitoneal 03/22/2019    FINDINGS:  Heart: Normal in size. No pericardial effusion.  Calcific atherosclerosis of the coronary arteries.    Lung Bases: Well aerated, without consolidation or pleural fluid.    Liver: Normal in size and attenuation, with no focal hepatic lesions.    Gallbladder: Removed    Bile Ducts: No evidence of dilated ducts.    Pancreas: No mass or peripancreatic fat stranding.    Spleen: Unremarkable.    Adrenals: 1.8 cm left adrenal nodule  consistent with adenoma.  Slightly enlarged when compared to 12/12/2012 where it measured 1 cm.    Kidneys/ Ureters: Right kidney appears unremarkable.  The left kidney is enlarged compared to the right kidney.  Surrounding fat stranding inflammatory changes left kidney.  Left ureter appears dilated  surrounding inflammatory changes.  Overall these findings are concerning for left kidney pyelonephritis.  No obstructive stones identified.    Bladder: No evidence of wall thickening.    Reproductive organs: 4.6 cm simple right ovarian cyst, stable dating back to 12/12/2012.    GI Tract/Mesentery: No evidence of bowel obstruction or inflammation.    Peritoneal Space: No ascites. No free air.    Retroperitoneum: No significant adenopathy.    Abdominal wall: Fatty replacement of much of the abdominal wall musculature.    Vasculature: No significant calcific atherosclerosis within the abdominal aorta, however the branch vessels of the abdominal aorta have mild calcific atherosclerosis.    Bones: Degenerative changes of the visualized spine and hips.  No acute fracture.  No suspicious lytic or blastic lesions.                                 Medical Decision Making:   Initial Assessment:   My differential includes renal colic, pyelonephritis, aortic dissection,  muscle strain, renal failure.  I will give IV Toradol and Zofran for her symptoms. I will obtain a CT renal scan.  I will check labs and urine.                   ED Course as of Feb 02 1348   Sun Feb 02, 2020   1302 The patient has complete resolution of her symptoms after IV Toradol, morphine and Zofran.  CT renal stone was discussed with Radiology.  It is concerning for pyelonephritis.  There was no evidence of renal stone, however there was moderate hydronephrosis.  Urine is pending.  The patient is afebrile.  There is no leukocytosis.    [WARREN]   1346 I reviewed the labs.  There is no leukocytosis.  There is no renal failure.  Urine has only 5 WBC and 3 RBC.  She is afebrile.  There is no tachycardia.  Although the CT reading is suggestive of pyelonephritis, there is no clinical evidence suggestive of pyelonephritis.  A urine culture will be sent.  Given the patient's sudden onset of symptoms, this may represent a kidney stone that spontaneously passed prior to arrival with resultant ureteral spasm.  There is currently no indication for antibiotics.  We have the patient's phone number and will contact her should her culture be positive. She was given strict instructions return for further flank pain, constant flank pain, fever, vomiting, or other new or urgent concerns.    [WARREN]      ED Course User Index  [WARREN] Kenney Villeda MD                Clinical Impression:       ICD-10-CM ICD-9-CM   1. Flank pain R10.9 789.09   2. Renal colic on left side N23 788.0                             Kenney Villeda MD  02/02/20 9570

## 2020-02-03 PROCEDURE — G0179 MD RECERTIFICATION HHA PT: HCPCS | Mod: ,,, | Performed by: INTERNAL MEDICINE

## 2020-02-03 PROCEDURE — G0179 PR HOME HEALTH MD RECERTIFICATION: ICD-10-PCS | Mod: ,,, | Performed by: INTERNAL MEDICINE

## 2020-02-04 ENCOUNTER — TELEPHONE (OUTPATIENT)
Dept: INTERNAL MEDICINE | Facility: CLINIC | Age: 75
End: 2020-02-04

## 2020-02-04 NOTE — TELEPHONE ENCOUNTER
----- Message from Rhea Stewart sent at 2/4/2020  2:09 PM CST -----  Contact: [facility] Martina [phone]  Martina is calling to inform MD that patient has not had a bowel movement since 1/29.  Please advise, thanks .

## 2020-02-04 NOTE — TELEPHONE ENCOUNTER
Please ask them to begin colace twice a day and if not successful then miralax one capful in 8 oz of liquid daily. Ame Vasquez

## 2020-02-04 NOTE — TELEPHONE ENCOUNTER
Spoke with  nurse. Stated pt hasn't had a bowl movement since January 29th. Pt told her she's not eating much. Sunday pt went to the ER for back pain.  nurse stated pt told her that she doesn't feel like she has to go.

## 2020-02-06 RX ORDER — BLOOD SUGAR DIAGNOSTIC
STRIP MISCELLANEOUS
Qty: 200 STRIP | Refills: 3 | Status: SHIPPED | OUTPATIENT
Start: 2020-02-06 | End: 2021-01-04

## 2020-02-07 ENCOUNTER — OFFICE VISIT (OUTPATIENT)
Dept: WOUND CARE | Facility: CLINIC | Age: 75
End: 2020-02-07
Payer: MEDICARE

## 2020-02-07 VITALS
HEIGHT: 67 IN | BODY MASS INDEX: 40.81 KG/M2 | SYSTOLIC BLOOD PRESSURE: 151 MMHG | HEART RATE: 68 BPM | TEMPERATURE: 98 F | RESPIRATION RATE: 18 BRPM | DIASTOLIC BLOOD PRESSURE: 79 MMHG | WEIGHT: 260 LBS

## 2020-02-07 DIAGNOSIS — I89.0 LYMPHEDEMA: ICD-10-CM

## 2020-02-07 DIAGNOSIS — L97.501 SKIN ULCER OF TOE, LIMITED TO BREAKDOWN OF SKIN, UNSPECIFIED LATERALITY: Primary | ICD-10-CM

## 2020-02-07 DIAGNOSIS — I87.2 VENOUS INSUFFICIENCY OF BOTH LOWER EXTREMITIES: Chronic | ICD-10-CM

## 2020-02-07 DIAGNOSIS — L97.911 ULCER OF RIGHT LOWER EXTREMITY, LIMITED TO BREAKDOWN OF SKIN: ICD-10-CM

## 2020-02-07 DIAGNOSIS — B35.3 TINEA PEDIS OF BOTH FEET: Chronic | ICD-10-CM

## 2020-02-07 DIAGNOSIS — I87.2 VENOUS STASIS DERMATITIS OF BOTH LOWER EXTREMITIES: Chronic | ICD-10-CM

## 2020-02-07 PROBLEM — L97.901 ULCER OF LOWER EXTREMITY, LIMITED TO BREAKDOWN OF SKIN: Status: RESOLVED | Noted: 2018-05-10 | Resolved: 2020-02-07

## 2020-02-07 PROBLEM — L97.412 ULCER OF RIGHT HEEL AND MIDFOOT WITH FAT LAYER EXPOSED: Status: RESOLVED | Noted: 2019-02-13 | Resolved: 2020-02-07

## 2020-02-07 PROCEDURE — 29580 PR STRAPPING UNNA BOOT: ICD-10-PCS | Mod: HCNC,RT,S$GLB, | Performed by: NURSE PRACTITIONER

## 2020-02-07 PROCEDURE — 29580 STRAPPING UNNA BOOT: CPT | Mod: HCNC,RT,S$GLB, | Performed by: NURSE PRACTITIONER

## 2020-02-07 PROCEDURE — 99999 PR PBB SHADOW E&M-EST. PATIENT-LVL V: ICD-10-PCS | Mod: PBBFAC,HCNC,, | Performed by: NURSE PRACTITIONER

## 2020-02-07 PROCEDURE — 99999 PR PBB SHADOW E&M-EST. PATIENT-LVL V: CPT | Mod: PBBFAC,HCNC,, | Performed by: NURSE PRACTITIONER

## 2020-02-07 PROCEDURE — 99499 UNLISTED E&M SERVICE: CPT | Mod: HCNC,S$GLB,, | Performed by: NURSE PRACTITIONER

## 2020-02-07 PROCEDURE — 99499 NO LOS: ICD-10-PCS | Mod: HCNC,S$GLB,, | Performed by: NURSE PRACTITIONER

## 2020-02-07 NOTE — PROGRESS NOTES
Subjective:       Patient ID: Sherin Ortiz is a 75 y.o. female.    Chief Complaint: Wound Check    Wound Check     Wound Check:   This patient is seen today for reevaluation of recurrent ulcers to the right foot and lower leg.  She reports this started at the end of October 2019. She has an unna boot on the right leg and kerlix and coban on the left leg.  She also has a new wound to the left fifth toe.  She has home health services through [facility]. Problems that interfere with wound healing include multiple co-morbidities, diabetes, edema, diabetic neuropathy and  morbid obesity. Because of safety issues we are unable to weigh the patient as she is unstable on her feet.  The patient ambulates with great difficulty secondary to her body habitus and uses a motorized wheelchair which we cannot get on the scale to weigh her.  She does not complain of pain.  She denies increased swelling, redness or purulent drainage.   She is afebrile.  Review of Systems    Unchanged from previous visit.  Objective:      Physical Exam   Constitutional: She is oriented to person, place, and time. No distress.   Morbidly obese   HENT:   Head: Normocephalic and atraumatic.   Neck: Normal range of motion. Neck supple.   Pulmonary/Chest: Effort normal.   Musculoskeletal: Normal range of motion. She exhibits edema. She exhibits no tenderness.        Legs:       Feet:    Neurological: She is alert and oriented to person, place, and time. Abnormal coordination: .   Skin: Skin is warm and dry. Rash (dermatitis and tinea bilateral lower extremities) noted. She is not diaphoretic. No cyanosis or erythema. No pallor. Nails show no clubbing.   Psychiatric: She has a normal mood and affect. Her behavior is normal. Judgment and thought content normal.   Nursing note and vitals reviewed.      Right shin      Left fifth toe      Hemoglobin A1C   Date Value Ref Range Status   11/06/2019 9.6 (H) 4.0 - 5.6 % Final     Comment:     ADA Screening  Guidelines:  5.7-6.4%  Consistent with prediabetes  >or=6.5%  Consistent with diabetes  High levels of fetal hemoglobin interfere with the HbA1C  assay. Heterozygous hemoglobin variants (HbS, HgC, etc)do  not significantly interfere with this assay.   However, presence of multiple variants may affect accuracy.     03/22/2019 9.8 (H) 4.0 - 5.6 % Final     Comment:     ADA Screening Guidelines:  5.7-6.4%  Consistent with prediabetes  >or=6.5%  Consistent with diabetes  High levels of fetal hemoglobin interfere with the HbA1C  assay. Heterozygous hemoglobin variants (HbS, HgC, etc)do  not significantly interfere with this assay.   However, presence of multiple variants may affect accuracy.     08/13/2018 10.6 (H) 4.0 - 5.6 % Final     Comment:     ADA Screening Guidelines:  5.7-6.4%  Consistent with prediabetes  >or=6.5%  Consistent with diabetes  High levels of fetal hemoglobin interfere with the HbA1C  assay. Heterozygous hemoglobin variants (HbS, HgC, etc)do  not significantly interfere with this assay.   However, presence of multiple variants may affect accuracy.       ..  Lab Results   Component Value Date    ALBUMIN 2.9 (L) 02/02/2020     Sherin was seen in the clinic room and placed in the supine position on the treatment table.  right leg was cleansed with Easi-clense sponges and dried thoroughly.  A mepilex lite foam dressing was applied to the right shin wounds.  Eucerin cream was applied to the lower legs.  The patient's foot was positioned at a 90 degree angle.  A zinc oxide wrap, followed by kerlix roll gauze and coban were applied using a spiral technique avoiding creases or folds.  The wrap was started behind the first metatarsal and ended below the tibial tubercle of the knee.  There was overlap of each turn half the width of the previous turn.  The compression wrap will be changed every 3-4 days.    Assessment:       1. Skin ulcer of toe, limited to breakdown of skin, unspecified laterality    2. Ulcer  of right lower extremity, limited to breakdown of skin    3. Venous insufficiency of both lower extremities    4. Tinea pedis of both feet    5. Venous stasis dermatitis of both lower extremities    6. Lymphedema        Plan:           Unna boot right lower leg as detailed above.  Pad bilateral ankles with ABD pads.  Kerlix and coban left lower leg.  Aquacel to left fifth toe ulcer, cotton in between the toes, cover with gauze and secure with roll gauze.  Cotton in between toes of right foot, cover with gauze and secure with roll gauze.  Return to clinic in one month.  [facility] notified of orders via email. We will ask them to see the patient twice weekly.    Home Health Orders:    Triamcinolone cream to bilateral lower legs.  Ketoconazole cream to both feet.  Cleanse wounds with wound cleanser.  Pad ankles with ABD pads.  Apply mepilex lite foam to right shin ulcers.  Unna boot (zinc oxide, kerlix and coban) right lower leg.  Kerlix and coban left lower leg.    Apply hydrofiber to left fifth toe ulcer, place cotton in between toes both feeth, cover with cotton gauze and secure with roll gauze.  Change dressings twice weekly.  Skilled nurse visit-2 x weekly

## 2020-02-12 ENCOUNTER — EXTERNAL HOME HEALTH (OUTPATIENT)
Dept: HOME HEALTH SERVICES | Facility: HOSPITAL | Age: 75
End: 2020-02-12
Payer: MEDICARE

## 2020-03-05 ENCOUNTER — TELEPHONE (OUTPATIENT)
Dept: INTERNAL MEDICINE | Facility: CLINIC | Age: 75
End: 2020-03-05

## 2020-03-05 NOTE — TELEPHONE ENCOUNTER
----- Message from Ebony Ng sent at 3/4/2020  1:58 PM CST -----  Contact: Radha with [employer]  fax # [phone]  Spoke with Radha in regard to a detail written order form 02/12/20 please fax over ref # [id number]

## 2020-03-06 ENCOUNTER — OFFICE VISIT (OUTPATIENT)
Dept: WOUND CARE | Facility: CLINIC | Age: 75
End: 2020-03-06
Payer: MEDICARE

## 2020-03-06 ENCOUNTER — TELEPHONE (OUTPATIENT)
Dept: INTERNAL MEDICINE | Facility: CLINIC | Age: 75
End: 2020-03-06

## 2020-03-06 VITALS
SYSTOLIC BLOOD PRESSURE: 161 MMHG | DIASTOLIC BLOOD PRESSURE: 89 MMHG | TEMPERATURE: 97 F | BODY MASS INDEX: 40.81 KG/M2 | HEIGHT: 67 IN | WEIGHT: 260 LBS | HEART RATE: 63 BPM

## 2020-03-06 DIAGNOSIS — I87.2 VENOUS INSUFFICIENCY OF BOTH LOWER EXTREMITIES: Chronic | ICD-10-CM

## 2020-03-06 DIAGNOSIS — I89.0 LYMPHEDEMA: ICD-10-CM

## 2020-03-06 DIAGNOSIS — B35.3 TINEA PEDIS OF BOTH FEET: Chronic | ICD-10-CM

## 2020-03-06 DIAGNOSIS — L97.911 ULCER OF RIGHT LOWER EXTREMITY, LIMITED TO BREAKDOWN OF SKIN: Primary | ICD-10-CM

## 2020-03-06 DIAGNOSIS — L97.521 ULCERATED, FOOT, LEFT, LIMITED TO BREAKDOWN OF SKIN: ICD-10-CM

## 2020-03-06 DIAGNOSIS — L97.521 SKIN ULCER OF TOE OF LEFT FOOT, LIMITED TO BREAKDOWN OF SKIN: ICD-10-CM

## 2020-03-06 PROBLEM — E80.6 HYPERBILIRUBINEMIA: Status: RESOLVED | Noted: 2019-03-22 | Resolved: 2020-03-06

## 2020-03-06 PROCEDURE — 29580 STRAPPING UNNA BOOT: CPT | Mod: 50,HCNC,S$GLB, | Performed by: NURSE PRACTITIONER

## 2020-03-06 PROCEDURE — 99999 PR PBB SHADOW E&M-EST. PATIENT-LVL V: ICD-10-PCS | Mod: PBBFAC,HCNC,, | Performed by: NURSE PRACTITIONER

## 2020-03-06 PROCEDURE — 99499 UNLISTED E&M SERVICE: CPT | Mod: HCNC,S$GLB,, | Performed by: NURSE PRACTITIONER

## 2020-03-06 PROCEDURE — 29580 PR STRAPPING UNNA BOOT: ICD-10-PCS | Mod: 50,HCNC,S$GLB, | Performed by: NURSE PRACTITIONER

## 2020-03-06 PROCEDURE — 99999 PR PBB SHADOW E&M-EST. PATIENT-LVL V: CPT | Mod: PBBFAC,HCNC,, | Performed by: NURSE PRACTITIONER

## 2020-03-06 PROCEDURE — 99499 NO LOS: ICD-10-PCS | Mod: HCNC,S$GLB,, | Performed by: NURSE PRACTITIONER

## 2020-03-06 NOTE — PROGRESS NOTES
Subjective:       Patient ID: Sherin Ortiz is a 75 y.o. female.    Chief Complaint: Wound Check    Wound Check     Wound Check:   This patient is seen today for reevaluation of recurrent ulcers to the right foot and lower leg.  She reports this started at the end of October 2019. She has an unna boot on the right leg and kerlix and coban on the left leg.  She also has a new wound to the left dorsal foot.  She has home health services through [facility]. Problems that interfere with wound healing include multiple co-morbidities, diabetes, edema, diabetic neuropathy and  morbid obesity. Because of safety issues we are unable to weigh the patient as she is unstable on her feet.  The patient ambulates with great difficulty secondary to her body habitus and uses a motorized wheelchair which we cannot get on the scale to weigh her.  She does not complain of pain.  She denies increased swelling, redness or purulent drainage.   She is afebrile.  Review of Systems    Unchanged from previous visit.  Objective:      Physical Exam   Constitutional: She is oriented to person, place, and time. No distress.   Morbidly obese   HENT:   Head: Normocephalic and atraumatic.   Neck: Normal range of motion. Neck supple.   Pulmonary/Chest: Effort normal.   Musculoskeletal: Normal range of motion. She exhibits edema. She exhibits no tenderness.        Legs:       Feet:    Neurological: She is alert and oriented to person, place, and time. Abnormal coordination: .   Skin: Skin is warm and dry. Rash (dermatitis and tinea bilateral lower extremities) noted. She is not diaphoretic. No cyanosis or erythema. No pallor. Nails show no clubbing.   Psychiatric: She has a normal mood and affect. Her behavior is normal. Judgment and thought content normal.   Nursing note and vitals reviewed.      Right shin      Left fifth toe      Left dorsal foot          Hemoglobin A1C   Date Value Ref Range Status   11/06/2019 9.6 (H) 4.0 - 5.6 % Final      Comment:     ADA Screening Guidelines:  5.7-6.4%  Consistent with prediabetes  >or=6.5%  Consistent with diabetes  High levels of fetal hemoglobin interfere with the HbA1C  assay. Heterozygous hemoglobin variants (HbS, HgC, etc)do  not significantly interfere with this assay.   However, presence of multiple variants may affect accuracy.     03/22/2019 9.8 (H) 4.0 - 5.6 % Final     Comment:     ADA Screening Guidelines:  5.7-6.4%  Consistent with prediabetes  >or=6.5%  Consistent with diabetes  High levels of fetal hemoglobin interfere with the HbA1C  assay. Heterozygous hemoglobin variants (HbS, HgC, etc)do  not significantly interfere with this assay.   However, presence of multiple variants may affect accuracy.     08/13/2018 10.6 (H) 4.0 - 5.6 % Final     Comment:     ADA Screening Guidelines:  5.7-6.4%  Consistent with prediabetes  >or=6.5%  Consistent with diabetes  High levels of fetal hemoglobin interfere with the HbA1C  assay. Heterozygous hemoglobin variants (HbS, HgC, etc)do  not significantly interfere with this assay.   However, presence of multiple variants may affect accuracy.       ..  Lab Results   Component Value Date    ALBUMIN 2.9 (L) 02/02/2020     Sherin was seen in the clinic room and placed in the supine position on the treatment table.  The unna boot was removed with scissors and the legs were cleansed with Easi-clense sponges and dried thoroughly.  A hydrofiber dressing was applied to the left dorsal foot wound.  Eucerin cream was applied to the lower legs.  The patient's feet were positioned at a 90 degree angle.  A zinc oxide wrap, followed by kerlix roll gauze and coban were applied using a spiral technique avoiding creases or folds.  The wraps were started behind the first metatarsal and ended below the tibial tubercle of the knee.  There was overlap of each turn half the width of the previous turn.  The compression wraps will be changed every 3-4 days.      Assessment:       1. Ulcer of  right lower extremity, limited to breakdown of skin    2. Venous insufficiency of both lower extremities    3. Tinea pedis of both feet    4. Lymphedema        Plan:           Unna boot bilateral lower legs as detailed above.  Aquacel to left fifth toe ulcer, cotton in between the toes, cover with gauze and secure with roll gauze.  Cotton in between toes of right foot, cover with gauze and secure with roll gauze.  Return to clinic in one month.  [facility] notified of orders via email. We will ask them to see the patient twice weekly.    Home Health Orders:    Triamcinolone cream to bilateral lower legs.  Ketoconazole cream to both feet.  Cleanse wounds with wound cleanser.  Pad ankles with ABD pads.  Apply hydrofiber to left dorsal foot ulcer.  Unna boot (zinc oxide, kerlix and coban) bilateral lower legs.  Apply hydrofiber to left fifth toe ulcer, place cotton in between toes both feet, cover with cotton gauze and secure with roll gauze.  Change dressings twice weekly.  Skilled nurse visit-2 x weekly

## 2020-03-06 NOTE — PROGRESS NOTES
Subjective:      Patient ID: Sherin Ortiz is a 75 y.o. female.    Chief Complaint: Gait Problem (Mobility Exam)    HPI:  HPI   Patient is here for a Mobility Exam    She was approved for a HoverounD in the past. The request today is for a replacement  HoverounD that can be used indoors and outdoors. Her condition is worsening and will not improve. We had submitted a request 5/2019 because of damage but only repairs were allowed at that time.    Patient continues to be able to live alone with the use of the HoverounD. She has a ramp that allows entrance and exit to and from the home. All doorways in the home allow access with the HoverounD. The HoverounD allows continued independence that would not be possible with a manual wheel chair.    Prior to the original request 5 years ago from a combination of lymphedema, peripheral vascular disease, balance disorder, osteoarthritis of the knees she eventually was unable to walk and currently other than going to restroom and going to bed she spends her day in the HoverounD. She is not able to sit in a chair or sofa in her home as she is not able to get up from it. In the bathroom she uses bars to get to the toilet and has a customized portable toilet. To get into bed she pivots on the HoverounD.    For transportation she uses a medical van service and uses the HoverounD to go to the bank, grocery, office appointments and therefore would need one for outside use also.    At 75 patient is able to handle her own affairs. She does have the support of her family.    She would be unable to continue her independence without the HoverounD, as she does not have the strength to manipulate her body and weight in a manual wheel chair.    Patient has developed chronic ulcers on her legs and her strength  in her legs has diminished to the point that she  Is non ambulatory.    To complete all activities of daily living including getting to the bathroom, getting to the kitchen to  prepare meals and eating at the table and getting to her bedroom to sleep are dependent on the HoverounD.    A cane , manual wheel chair or scooter could not be used by this patient and the patient has demonstrated that she can safely operate the power mobility device both mentally and physically.. She remains willing and motivated to use the HoverounD in the home and for appts, shopping and banking purposes.    Diabetes    A1C due  Diabetes Management Status    Statin: Taking  ACE/ARB: Not taking    Screening or Prevention Patient's value Goal Complete/Controlled?   HgA1C Testing and Control   Lab Results   Component Value Date    HGBA1C 9.1 (H) 03/09/2020      Annually/Less than 8% No   Lipid profile : 11/06/2019 Annually Yes   LDL control Lab Results   Component Value Date    LDLCALC 68.8 11/06/2019    Annually/Less than 100 mg/dl  Yes   Nephropathy screening Lab Results   Component Value Date    LABMICR 29.1 09/26/2011     Lab Results   Component Value Date    PROTEINUA Negative 02/02/2020    Annually Yes   Blood pressure BP Readings from Last 1 Encounters:   03/09/20 110/76    Less than 140/90 No   Dilated retinal exam : 05/29/2019 Annually Yes   Foot exam   : 05/30/2019 Annually Yes       Patient Active Problem List   Diagnosis    Bilateral leg edema    Tinea pedis    Hyperlipidemia LDL goal <100    Essential hypertension    PSC (posterior subcapsular cataract) - Both Eyes    Nuclear sclerosis - Both Eyes    Asteroid hyalosis - Left Eye    Stage 3 chronic kidney disease    Dystrophic nail    Weakness    Inability to walk    Morbid obesity with BMI of 40.0-44.9, adult    Diabetic peripheral neuropathy associated with type 2 diabetes mellitus    Anemia of chronic disease    Hypoalbuminemia    Wheelchair dependent    Venous insufficiency of leg    Dermatitis, stasis    Uncontrolled type 2 diabetes mellitus with stage 3 chronic kidney disease, with long-term current use of insulin     Hyperparathyroidism    Aortic atherosclerosis    PAOD (peripheral arterial occlusive disease)    Right shoulder strain, initial encounter    Hx of transient ischemic attack (TIA)    (HFpEF) heart failure with preserved ejection fraction    Ulcer of lower extremity, limited to breakdown of skin    Leukocytosis    Hyperglycemia    Prolonged Q-T interval on ECG    Goals of care, counseling/discussion    Acute gout of left hand    Left wrist pain    Dermatitis associated with moisture    Lymphedema    Skin ulcer of toe, limited to breakdown of skin    Ulcerated, foot, left, limited to breakdown of skin    Chronic kidney disease, stage III (moderate)     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    Benign essential hypertension 11/14/2012    Cataract     s/p phacoemulsification    Chronic kidney disease (CKD), stage III (moderate) 9/12/2013    Diabetic peripheral neuropathy associated with type 2 diabetes mellitus 11/14/2014    causing right hemiparesis    Gait disorder     Hyperlipidemia     Iritis - Both Eyes 6/10/2013    Kidney stone     Lymphedema     Morbid obesity with BMI of 40.0-44.9, adult 2/18/2015    Nephrolithiasis 4/20/2016    NS (nuclear sclerosis) 4/1/2013    Nuclear sclerosis - Both Eyes 4/29/2013    Preseptal cellulitis - Right Eye 4/29/2013    Proliferative diabetic retinopathy - Both Eyes 4/29/2013    Proliferative diabetic retinopathy, both eyes 4/1/2013    PSC (posterior subcapsular cataract) - Both Eyes 4/29/2013    S/P hernia repair 12/19/2012    TIA (transient ischemic attack) 11/18/2014    Tinea pedis 7/24/2012    Tinea pedis is present on both feet.     Type 2 diabetes mellitus with renal manifestations, controlled 12/12/2013    Type 2 diabetes, controlled, with moderate nonproliferative diabetic retinopathy without macular edema 9/17/2015    Ulcer of left lower extremity, limited to breakdown of skin  "7/8/2015    Unspecified cerebral artery occlusion with cerebral infarction 11/16/2014    Unspecified venous (peripheral) insufficiency     Ureteral stone with hydronephrosis 1/27/2016    UTI (lower urinary tract infection)     Vaginal infection     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     Past Surgical History:   Procedure Laterality Date    APPENDECTOMY      CATARACT EXTRACTION W/  INTRAOCULAR LENS IMPLANT Left 5/21/2013    CATARACT EXTRACTION W/  INTRAOCULAR LENS IMPLANT Right 6/4/2013    CHOLECYSTECTOMY      COLONOSCOPY  12/22/2005    normal    ESOPHAGOGASTRODUODENOSCOPY  12/21/2015    hiatal hernia, Schatzki ring    EYE SURGERY Bilateral 2008    laser surgery both eyes    NASAL SEPTUM SURGERY      SUBTOTAL COLECTOMY  12/13/2012    transverse colon, for incarcerated umbilical hernia, Dr. Kat Bower     Family History   Problem Relation Age of Onset    Diabetes Sister     Cataracts Sister     Heart disease Brother     Cataracts Brother     Leukemia Mother     Cancer Neg Hx     Amblyopia Neg Hx     Blindness Neg Hx     Glaucoma Neg Hx     Hypertension Neg Hx     Macular degeneration Neg Hx     Retinal detachment Neg Hx     Strabismus Neg Hx     Stroke Neg Hx     Thyroid disease Neg Hx     Kidney disease Neg Hx      Review of Systems   No falls in the last year  No new symptoms that would not allow her to use the equipment  Objective:     Vitals:    03/09/20 1302 03/09/20 2112   BP: 110/76    Pulse: 62    SpO2: 100%    Weight:  117.9 kg (260 lb)   Height:  5' 7" (1.702 m)   PainSc: 0-No pain      Physical Exam     Body mass index is 42.29 kg/m².  Physical Exam   Constitutional: She is oriented to person, place, and time. She appears well-developed and well-nourished. No distress.   Neck: Carotid bruit is not present. No thyromegaly present.   Cardiovascular: Normal rate, regular rhythm and normal heart sounds. PMI is not displaced.   Pulmonary/Chest: Effort normal and " breath sounds normal. No respiratory distress.   Abdominal: Soft. Bowel sounds are normal. She exhibits no distension. There is no tenderness.   Musculoskeletal: She exhibits edema. Legs are wrapped and a strength exam would not be able to be done  Neurological: She is alert and oriented to person, place, and time.    patient's legs are wrapped with compression wraps   The patient presents to the appointment in the power mobility device.  She has always as long as I have known her required some form of mobility device and over the last 10 years the power mobility device as her condition worsen.  This device is obviously in very used and poor condition and should be replaced.  Assessment:     1. Gait abnormality    2. Morbid obesity with BMI of 40.0-44.9, adult    3. Lymphedema    4. Balance disorder    5. Osteoarthritis of both knees, unspecified osteoarthritis type    6. Uncontrolled type 2 diabetes mellitus with stage 3 chronic kidney disease, with long-term current use of insulin    7. Heart failure with preserved ejection fraction, unspecified HF chronicity    8. Hyperparathyroidism    9. Aortic atherosclerosis    10. TIA (transient ischemic attack)    11. Chronic kidney disease, stage III (moderate)      Plan:   Sherin was seen today for gait problem.    Diagnoses and all orders for this visit:    Gait abnormality  Comments:  Requires mobility device, unable to walk, able to use HoverounD and has been using for many yeas    Morbid obesity with BMI of 40.0-44.9, adult  Comments:  Discussed and aware    Lymphedema  Comments:  Chronic and worsening contributes to inability to work    Balance disorder  Comments:  Contributes to unsafe walking    Osteoarthritis of both knees, unspecified osteoarthritis type  Comments:  Contributes to inability to walk    Uncontrolled type 2 diabetes mellitus with stage 3 chronic kidney disease, with long-term current use of insulin  Comments:  Evaluated  Orders:  -     CBC auto  differential; Future  -     Comprehensive metabolic panel; Future  -     Hemoglobin A1c; Future  -     Lipid panel; Future    Heart failure with preserved ejection fraction, unspecified HF chronicity  Comments:  Stable at present    Hyperparathyroidism    Aortic atherosclerosis  Comments:  Continue asa and statin    TIA (transient ischemic attack)  Comments:  Patient wishes to ask Vascular Neurology whether she should continue plavix  Orders:  -     Ambulatory referral/consult to Vascular Neurology; Future    Chronic kidney disease, stage III (moderate)        Problem List Items Addressed This Visit     Uncontrolled type 2 diabetes mellitus with stage 3 chronic kidney disease, with long-term current use of insulin (Chronic)    Relevant Orders    CBC auto differential    Comprehensive metabolic panel    Hemoglobin A1c    Lipid panel    Morbid obesity with BMI of 40.0-44.9, adult    Hyperparathyroidism    Aortic atherosclerosis    Overview     CXR 1/2016         (HFpEF) heart failure with preserved ejection fraction    Lymphedema    Chronic kidney disease, stage III (moderate)      Other Visit Diagnoses     Gait abnormality    -  Primary    Requires mobility device, unable to walk, able to use HoverounD and has been using for many yeas    Balance disorder        Contributes to unsafe walking    Osteoarthritis of both knees, unspecified osteoarthritis type        Contributes to inability to walk    TIA (transient ischemic attack)        Patient wishes to ask Vascular Neurology whether she should continue plavix    Relevant Orders    Ambulatory referral/consult to Vascular Neurology        Orders Placed This Encounter   Procedures    CBC auto differential     Standing Status:   Future     Standing Expiration Date:   5/9/2021    Comprehensive metabolic panel     Standing Status:   Future     Standing Expiration Date:   5/9/2021    Hemoglobin A1c     Standing Status:   Future     Standing Expiration Date:   5/9/2021  "   Lipid panel     Standing Status:   Future     Standing Expiration Date:   5/9/2021    Ambulatory referral/consult to Vascular Neurology     Standing Status:   Future     Standing Expiration Date:   4/9/2021     Referral Priority:   Routine     Referral Type:   Consultation     Referral Reason:   Specialty Services Required     Requested Specialty:   Vascular Neurology     Number of Visits Requested:   1     Follow up in about 3 months (around 6/9/2020) for Follow up cbc, cmp, lipid and A1C.     Medication List           Accurate as of March 9, 2020  9:27 PM. If you have any questions, ask your nurse or doctor.               CHANGE how you take these medications    Lantus U-100 Insulin 100 unit/mL injection  Generic drug:  insulin glargine  INJECT 7 TO 10 UNITS SUBCUTANEOUSLY IN THE EVENING DEPENDING ON SCALE (DISCARD EACH VIAL AFTER 28 DAYS)  What changed:  See the new instructions.        CONTINUE taking these medications    Accu-Chek Estefani Plus test strp Strp  Generic drug:  blood sugar diagnostic  TEST TWICE DAILY     Accu-Chek Softclix Lancets Misc  Generic drug:  lancets     aspirin 81 MG Chew     atorvastatin 40 MG tablet  Commonly known as:  LIPITOR  TAKE 1 TABLET EVERY EVENING     BD Insulin Syringe Ultra-Fine 0.5 mL 30 gauge x 1/2" Syrg  Generic drug:  insulin syringe-needle U-100  USE TO INJECT EVERY NIGHT     blood-glucose meter Misc  Dispense one meter: Insurance Brand Preference Accu-Chek     bumetanide 1 MG tablet  Commonly known as:  BUMEX  Take 2 tablets (2 mg total) by mouth once daily.     clopidogreL 75 mg tablet  Commonly known as:  PLAVIX  TAKE 1 TABLET EVERY DAY     glimepiride 1 MG tablet  Commonly known as:  AMARYL  Take 2 tablets (2 mg total) by mouth once daily.     ketoconazole 2 % cream  Commonly known as:  NIZORAL     miconazole nitrate 2% 2 % Oint  Commonly known as:  MICOTIN  Apply topically 2 (two) times daily.     triamcinolone acetonide 0.1% 0.1 % cream  Commonly known as:  " KENALOG  APPLY TOPICALLY TWICE DAILY

## 2020-03-06 NOTE — TELEPHONE ENCOUNTER
Patient may want to check A1C before her appt on Monday.    Outpatient Procedures Ordered This Visit    Hemoglobin A1c

## 2020-03-09 ENCOUNTER — OFFICE VISIT (OUTPATIENT)
Dept: INTERNAL MEDICINE | Facility: CLINIC | Age: 75
End: 2020-03-09
Payer: MEDICARE

## 2020-03-09 ENCOUNTER — LAB VISIT (OUTPATIENT)
Dept: LAB | Facility: HOSPITAL | Age: 75
End: 2020-03-09
Attending: INTERNAL MEDICINE
Payer: MEDICARE

## 2020-03-09 VITALS
SYSTOLIC BLOOD PRESSURE: 110 MMHG | DIASTOLIC BLOOD PRESSURE: 76 MMHG | HEIGHT: 67 IN | HEART RATE: 62 BPM | OXYGEN SATURATION: 100 % | WEIGHT: 260 LBS | BODY MASS INDEX: 40.81 KG/M2

## 2020-03-09 DIAGNOSIS — R26.9 GAIT ABNORMALITY: Primary | ICD-10-CM

## 2020-03-09 DIAGNOSIS — R26.89 BALANCE DISORDER: ICD-10-CM

## 2020-03-09 DIAGNOSIS — M17.0 OSTEOARTHRITIS OF BOTH KNEES, UNSPECIFIED OSTEOARTHRITIS TYPE: ICD-10-CM

## 2020-03-09 DIAGNOSIS — I70.0 AORTIC ATHEROSCLEROSIS: ICD-10-CM

## 2020-03-09 DIAGNOSIS — G45.9 TIA (TRANSIENT ISCHEMIC ATTACK): ICD-10-CM

## 2020-03-09 DIAGNOSIS — I89.0 LYMPHEDEMA: ICD-10-CM

## 2020-03-09 DIAGNOSIS — N18.30 CHRONIC KIDNEY DISEASE, STAGE III (MODERATE): ICD-10-CM

## 2020-03-09 DIAGNOSIS — E21.3 HYPERPARATHYROIDISM: ICD-10-CM

## 2020-03-09 DIAGNOSIS — E66.01 MORBID OBESITY WITH BMI OF 40.0-44.9, ADULT: ICD-10-CM

## 2020-03-09 DIAGNOSIS — I50.30 HEART FAILURE WITH PRESERVED EJECTION FRACTION, UNSPECIFIED HF CHRONICITY: ICD-10-CM

## 2020-03-09 PROBLEM — N18.4 CHRONIC KIDNEY DISEASE, STAGE 4 (SEVERE): Status: ACTIVE | Noted: 2020-03-09

## 2020-03-09 LAB
ESTIMATED AVG GLUCOSE: 214 MG/DL (ref 68–131)
HBA1C MFR BLD HPLC: 9.1 % (ref 4–5.6)

## 2020-03-09 PROCEDURE — 99999 PR PBB SHADOW E&M-EST. PATIENT-LVL IV: CPT | Mod: PBBFAC,HCNC,, | Performed by: INTERNAL MEDICINE

## 2020-03-09 PROCEDURE — 3074F PR MOST RECENT SYSTOLIC BLOOD PRESSURE < 130 MM HG: ICD-10-PCS | Mod: HCNC,CPTII,S$GLB, | Performed by: INTERNAL MEDICINE

## 2020-03-09 PROCEDURE — 99999 PR PBB SHADOW E&M-EST. PATIENT-LVL IV: ICD-10-PCS | Mod: PBBFAC,HCNC,, | Performed by: INTERNAL MEDICINE

## 2020-03-09 PROCEDURE — 36415 COLL VENOUS BLD VENIPUNCTURE: CPT | Mod: HCNC

## 2020-03-09 PROCEDURE — 99499 UNLISTED E&M SERVICE: CPT | Mod: HCNC,S$GLB,, | Performed by: INTERNAL MEDICINE

## 2020-03-09 PROCEDURE — 99214 OFFICE O/P EST MOD 30 MIN: CPT | Mod: HCNC,S$GLB,, | Performed by: INTERNAL MEDICINE

## 2020-03-09 PROCEDURE — 1126F PR PAIN SEVERITY QUANTIFIED, NO PAIN PRESENT: ICD-10-PCS | Mod: HCNC,S$GLB,, | Performed by: INTERNAL MEDICINE

## 2020-03-09 PROCEDURE — 1101F PT FALLS ASSESS-DOCD LE1/YR: CPT | Mod: HCNC,CPTII,S$GLB, | Performed by: INTERNAL MEDICINE

## 2020-03-09 PROCEDURE — 83036 HEMOGLOBIN GLYCOSYLATED A1C: CPT | Mod: HCNC

## 2020-03-09 PROCEDURE — 1159F PR MEDICATION LIST DOCUMENTED IN MEDICAL RECORD: ICD-10-PCS | Mod: HCNC,S$GLB,, | Performed by: INTERNAL MEDICINE

## 2020-03-09 PROCEDURE — 3078F DIAST BP <80 MM HG: CPT | Mod: HCNC,CPTII,S$GLB, | Performed by: INTERNAL MEDICINE

## 2020-03-09 PROCEDURE — 99214 PR OFFICE/OUTPT VISIT, EST, LEVL IV, 30-39 MIN: ICD-10-PCS | Mod: HCNC,S$GLB,, | Performed by: INTERNAL MEDICINE

## 2020-03-09 PROCEDURE — 99499 RISK ADDL DX/OHS AUDIT: ICD-10-PCS | Mod: HCNC,S$GLB,, | Performed by: INTERNAL MEDICINE

## 2020-03-09 PROCEDURE — 1126F AMNT PAIN NOTED NONE PRSNT: CPT | Mod: HCNC,S$GLB,, | Performed by: INTERNAL MEDICINE

## 2020-03-09 PROCEDURE — 3078F PR MOST RECENT DIASTOLIC BLOOD PRESSURE < 80 MM HG: ICD-10-PCS | Mod: HCNC,CPTII,S$GLB, | Performed by: INTERNAL MEDICINE

## 2020-03-09 PROCEDURE — 1101F PR PT FALLS ASSESS DOC 0-1 FALLS W/OUT INJ PAST YR: ICD-10-PCS | Mod: HCNC,CPTII,S$GLB, | Performed by: INTERNAL MEDICINE

## 2020-03-09 PROCEDURE — 1159F MED LIST DOCD IN RCRD: CPT | Mod: HCNC,S$GLB,, | Performed by: INTERNAL MEDICINE

## 2020-03-09 PROCEDURE — 3074F SYST BP LT 130 MM HG: CPT | Mod: HCNC,CPTII,S$GLB, | Performed by: INTERNAL MEDICINE

## 2020-03-11 RX ORDER — ATORVASTATIN CALCIUM 40 MG/1
TABLET, FILM COATED ORAL
Qty: 90 TABLET | Refills: 3 | Status: SHIPPED | OUTPATIENT
Start: 2020-03-11 | End: 2021-04-22

## 2020-03-16 ENCOUNTER — TELEPHONE (OUTPATIENT)
Dept: INTERNAL MEDICINE | Facility: CLINIC | Age: 75
End: 2020-03-16

## 2020-03-16 NOTE — TELEPHONE ENCOUNTER
----- Message from Win Luevano sent at 3/16/2020  1:12 PM CDT -----  Contact: [facility] [phone] Ext  105  Patient would like to get medical advice.    Comments: .03/04/20 medical necesicity form was fax to show had wound from May or three Months prior of 2019 [facility] [phone] Ext  105    Please call an advise  Thank you

## 2020-03-19 RX ORDER — CLOPIDOGREL BISULFATE 75 MG/1
TABLET ORAL
Qty: 90 TABLET | Refills: 3 | Status: SHIPPED | OUTPATIENT
Start: 2020-03-19 | End: 2021-06-22

## 2020-03-19 RX ORDER — INSULIN GLARGINE 100 [IU]/ML
INJECTION, SOLUTION SUBCUTANEOUS
Qty: 30 ML | Refills: 3 | Status: SHIPPED | OUTPATIENT
Start: 2020-03-19 | End: 2020-03-31 | Stop reason: SDUPTHER

## 2020-03-19 NOTE — TELEPHONE ENCOUNTER
Pt stated she takes 35 units of the lantus at night. States the rx always says 10, but she takes 35 units.

## 2020-03-20 ENCOUNTER — TELEPHONE (OUTPATIENT)
Dept: INTERNAL MEDICINE | Facility: CLINIC | Age: 75
End: 2020-03-20

## 2020-03-20 NOTE — TELEPHONE ENCOUNTER
Spoke with the hover[employer] company. Rep stated in the notes it has to have a medical reason and to why pt can't use a cane or scooter.    Also stated on page 4, question number 4 have to put a dx.

## 2020-03-20 NOTE — TELEPHONE ENCOUNTER
----- Message from Raquel Webster sent at 3/20/2020 10:56 AM CDT -----  Contact: Ms. Adams @ [email or web address] Around [phone], fax# [phone] ref# [id number]  Ms. Adams would like a call back in regards to her wanting to know what is the status on an order the she faxed over to you on 03/12/2020 for a power wheel wheel chair for the patient?

## 2020-03-31 ENCOUNTER — TELEPHONE (OUTPATIENT)
Dept: INTERNAL MEDICINE | Facility: CLINIC | Age: 75
End: 2020-03-31

## 2020-03-31 RX ORDER — INSULIN GLARGINE 100 [IU]/ML
INJECTION, SOLUTION SUBCUTANEOUS
Qty: 30 ML | Refills: 3 | Status: SHIPPED | OUTPATIENT
Start: 2020-03-31 | End: 2020-04-13 | Stop reason: SDUPTHER

## 2020-03-31 NOTE — TELEPHONE ENCOUNTER
----- Message from Brea Salgado sent at 3/31/2020 11:34 AM CDT -----  Contact: [phone]  Type: Rx    Name of medication(s): insulin glargine (LANTUS U-100 INSULIN) 100 unit/mL injection    Is this a refill? New rx? refill    Who prescribed medication? Dr. NIGHAT melchor    Pharmacy Name, Phone, & Location:  [facility] [address]      Comments:  Please advise, thank you

## 2020-03-31 NOTE — TELEPHONE ENCOUNTER
----- Message from Mariela Rea sent at 3/31/2020  1:21 PM CDT -----  Contact: Patient/[phone]  Requesting an RX refill or new RX.  Is this a refill or new RX:    RX name and strength: insulin glargine (LANTUS U-100 INSULIN) 100 unit/mL injection   Directions (copy/paste from chart):    Is this a 30 day or 90 day RX:    Local pharmacy or mail order pharmacy:  Mail  Pharmacy name and phone # [facility] - [place], [place] - [address]  Comments:

## 2020-04-03 PROCEDURE — G0179 MD RECERTIFICATION HHA PT: HCPCS | Mod: ,,, | Performed by: INTERNAL MEDICINE

## 2020-04-03 PROCEDURE — G0179 PR HOME HEALTH MD RECERTIFICATION: ICD-10-PCS | Mod: ,,, | Performed by: INTERNAL MEDICINE

## 2020-04-07 ENCOUNTER — TELEPHONE (OUTPATIENT)
Dept: INTERNAL MEDICINE | Facility: CLINIC | Age: 75
End: 2020-04-07

## 2020-04-07 NOTE — TELEPHONE ENCOUNTER
----- Message from Rush Kc MA sent at 4/7/2020  1:49 PM CDT -----  Can you please check Dr. Pedro bello and see if this is in here give it to her please      Preferred Name:   Sherin Ortiz  Female, 75 y.o., 1945  Phone:   [phone] (M)  PCP:   Ame Vasquez MD  Language:   English  Need Interp:   No  Allergies Last Reviewed:   03/09/20  Allergies:   Penicillins,      Sulfa (Sulfonamide Antibiotics)  Health Maintenance:   Due  Primary Ins.:   [employer] MANAGED MEDICARE  MRN:   [id number]  Pt Comm Pref:   Patient Portal, Mail  Next Appt:   With Internal Medicine (Ame Vasquez MD)  06/08/2020 at 1:30 PM  My Sticky Note:     Specialty Comments:     Message   Received: Today   Message Contents   Emily THOMPSON Staff  Caller: Angelica---[phone]--[employer]--ref# [id number] (Today,  1:40 PM)         Needs Advice     Reason for call:       Angelica states that she faxed the form for the pt to your office. It just needs to signed and faxed back, she states that the fax number is on the form.     Communication Preference: Angelica---[phone]--[employer]--ref# [id number]     Additional Information:     Angelica states if there is any questions or concerns to please contact the office.

## 2020-04-13 RX ORDER — INSULIN GLARGINE 100 [IU]/ML
INJECTION, SOLUTION SUBCUTANEOUS
Qty: 30 ML | Refills: 3 | Status: SHIPPED | OUTPATIENT
Start: 2020-04-13 | End: 2021-03-28

## 2020-04-13 NOTE — TELEPHONE ENCOUNTER
----- Message from Марина Arias sent at 4/13/2020  9:43 AM CDT -----  Contact: self   Requesting an RX refill or new RX.  Is this a refill or new RX:  refill  RX name and strength:insulin glargine (LANTUS U-100 INSULIN) 100 unit/mL injection  Directions (copy/paste from chart): Sig: INJECT 30-35 UNITS SUBCUTANEOUSLY IN THE EVENING DEPENDING ON SCALE (DISCARD EACH VIAL AFTER 28 DAYS)   Is this a 30 day or 90 day RX:  90  Local pharmacy or mail order pharmacy:  Mail order  Pharmacy name and phone # (copy/paste from chart):   [facility] - [facility] [address] [facility] [phone] (Phone)  [phone] (Fax)  Comments:

## 2020-04-15 ENCOUNTER — EXTERNAL HOME HEALTH (OUTPATIENT)
Dept: HOME HEALTH SERVICES | Facility: HOSPITAL | Age: 75
End: 2020-04-15
Payer: MEDICARE

## 2020-04-17 ENCOUNTER — TELEPHONE (OUTPATIENT)
Dept: WOUND CARE | Facility: CLINIC | Age: 75
End: 2020-04-17

## 2020-04-17 NOTE — TELEPHONE ENCOUNTER
----- Message from Lakeshia Sims sent at 4/17/2020  1:40 PM CDT -----  Thea is callinjhg on behalf of the pt and stating that the pt has a wound on her left toe and would like for the nurse to give her a call back at [phone]

## 2020-04-17 NOTE — TELEPHONE ENCOUNTER
----- Message from Lakeshia Sims sent at 4/17/2020  1:40 PM CDT -----  Thea is callinjhg on behalf of the pt and stating that the pt has a wound on her left toe and would like for the nurse to give her a call back at [phone]

## 2020-04-20 ENCOUNTER — DOCUMENT SCAN (OUTPATIENT)
Dept: HOME HEALTH SERVICES | Facility: HOSPITAL | Age: 75
End: 2020-04-20
Payer: MEDICARE

## 2020-05-11 ENCOUNTER — TELEPHONE (OUTPATIENT)
Dept: INTERNAL MEDICINE | Facility: CLINIC | Age: 75
End: 2020-05-11

## 2020-05-11 DIAGNOSIS — N30.91 HEMATURIA DUE TO CYSTITIS: Primary | ICD-10-CM

## 2020-05-11 RX ORDER — NITROFURANTOIN (MACROCRYSTALS) 100 MG/1
100 CAPSULE ORAL EVERY 12 HOURS
Qty: 14 CAPSULE | Refills: 0 | Status: SHIPPED | OUTPATIENT
Start: 2020-05-11 | End: 2020-08-05 | Stop reason: SDUPTHER

## 2020-05-11 NOTE — TELEPHONE ENCOUNTER
Please fax the urinalysis and culture orders to the home health she is using.    Please tell her that nitrofurantoin was sent to her pharmacy

## 2020-05-11 NOTE — TELEPHONE ENCOUNTER
----- Message from Bhavana Acosta sent at 5/11/2020  8:38 AM CDT -----  Contact: patient [phone]  Patient called with c/o a chronic problem. Pt hs no pain or other sx but states that she has had blood in urine since Saturday. Urine looks brown. Patient has had this in the past  And you ordered antibiotics. Please order and notify patient or if you need a specimen, pt's nurse is visiting her tomorrow for another problem and can collect the specimen then.      [id number] [place] [facility] [address] [phone] (

## 2020-05-27 ENCOUNTER — TELEPHONE (OUTPATIENT)
Dept: INTERNAL MEDICINE | Facility: CLINIC | Age: 75
End: 2020-05-27

## 2020-05-27 RX ORDER — SILVER SULFADIAZINE 10 G/1000G
CREAM TOPICAL DAILY
Qty: 1 TUBE | Refills: 0 | Status: ON HOLD | OUTPATIENT
Start: 2020-05-27 | End: 2020-12-10 | Stop reason: HOSPADM

## 2020-05-27 NOTE — TELEPHONE ENCOUNTER
Please tell the home health nurse that I have prescribed Silvadene cream for the burn.  Although the patient's chart says she is allergic to sulfa it was an unusual reaction and not clearly a sulfa reaction.  For this she will be using it topically and should not be a problem

## 2020-05-27 NOTE — TELEPHONE ENCOUNTER
Spoke to pt's Home health nurse Martina and she said pt burned her finger and was unaware that she did it. Pt have an open area on middle finger

## 2020-06-02 PROCEDURE — G0179 MD RECERTIFICATION HHA PT: HCPCS | Mod: ,,, | Performed by: INTERNAL MEDICINE

## 2020-06-02 PROCEDURE — G0179 PR HOME HEALTH MD RECERTIFICATION: ICD-10-PCS | Mod: ,,, | Performed by: INTERNAL MEDICINE

## 2020-06-08 ENCOUNTER — OFFICE VISIT (OUTPATIENT)
Dept: INTERNAL MEDICINE | Facility: CLINIC | Age: 75
End: 2020-06-08
Payer: MEDICARE

## 2020-06-08 DIAGNOSIS — R39.9 LOWER URINARY TRACT SYMPTOMS: ICD-10-CM

## 2020-06-08 DIAGNOSIS — R29.898 ANKLE WEAKNESS: Primary | ICD-10-CM

## 2020-06-08 PROCEDURE — 99442 PR PHYSICIAN TELEPHONE EVALUATION 11-20 MIN: CPT | Mod: HCNC,95,, | Performed by: INTERNAL MEDICINE

## 2020-06-08 PROCEDURE — 99442 PR PHYSICIAN TELEPHONE EVALUATION 11-20 MIN: ICD-10-PCS | Mod: HCNC,95,, | Performed by: INTERNAL MEDICINE

## 2020-06-08 NOTE — PROGRESS NOTES
Established Patient - Audio Only Telehealth Visit     The patient location is: home  The chief complaint leading to consultation is: follow up , ankle , lab diabetes  Visit type: Virtual visit with audio only (telephone)  Total time spent with patient: 22 minutes       The reason for the audio only service rather than synchronous audio and video virtual visit was related to technical difficulties or patient preference/necessity.     Each patient to whom I provide medical services by telemedicine is:  (1) informed of the relationship between the physician and patient and the respective role of any other health care provider with respect to management of the patient; and (2) notified that they may decline to receive medical services by telemedicine and may withdraw from such care at any time. Patient verbally consented to receive this service via voice-only telephone call.       HPI:   Patient states that her left foot turns so she cannot put weight or a shoe on it.  We discussed options and I believe a podiatrist would be the best option to see if there was issue or brace that would help.    Patient continues to see Ms. Alvarenga for  wound care.    We also discussed her urinary tract infections and will have home health obtain a urine for urinalysis and culture along with a microalbumin that is due.    Patient is also due for laboratory studies       Assessment and plan:      Diagnoses and all orders for this visit:    Ankle weakness  Comments:  Referral to podiatry  Orders:  -     Ambulatory referral/consult to Podiatry; Future    Lower urinary tract symptoms  Comments:  Will have home health obtain urine, culture as well as microalbumin  Orders:  -     Urinalysis; Future  -     Urine culture; Future    Uncontrolled type 2 diabetes mellitus with stage 3 chronic kidney disease, with long-term current use of insulin  Comments:  Microalbumin and labs are due.  Patient states she checks her sugars but unfortunately the  A1cs do not correlate.  Orders:  -     Cancel: Microalbumin/creatinine urine ratio; Future  -     Microalbumin/creatinine urine ratio; Future  -     CBC auto differential; Future  -     Comprehensive metabolic panel; Future  -     Hemoglobin A1C; Future  -     Lipid Panel; Future                              This service was not originating from a related E/M service provided within the previous 7 days nor will  to an E/M service or procedure within the next 24 hours or my soonest available appointment.  Prevailing standard of care was able to be met in this audio-only visit.

## 2020-06-12 ENCOUNTER — EXTERNAL HOME HEALTH (OUTPATIENT)
Dept: HOME HEALTH SERVICES | Facility: HOSPITAL | Age: 75
End: 2020-06-12
Payer: MEDICARE

## 2020-06-18 ENCOUNTER — PATIENT OUTREACH (OUTPATIENT)
Dept: ADMINISTRATIVE | Facility: OTHER | Age: 75
End: 2020-06-18

## 2020-06-19 ENCOUNTER — TELEPHONE (OUTPATIENT)
Dept: WOUND CARE | Facility: CLINIC | Age: 75
End: 2020-06-19

## 2020-06-19 NOTE — TELEPHONE ENCOUNTER
----- Message from Lakeshia Sims sent at 6/19/2020  8:48 AM CDT -----  Pt is calling to cancel her appt today and would like to reschedule it but also would like for the nurse to give her a call back

## 2020-06-29 ENCOUNTER — TELEPHONE (OUTPATIENT)
Dept: WOUND CARE | Facility: CLINIC | Age: 75
End: 2020-06-29

## 2020-06-29 NOTE — TELEPHONE ENCOUNTER
Contacted patient in response to message. Notified patient that if she has no wounds she does not need to be seen in wound care clinic. Pt verbalized understanding and agreed.----- Message from Inés Mccrary sent at 6/29/2020  1:27 PM CDT -----  Contact: self @ [phone]  Pt is scheduled to f/u on 7-8-20.  Pt says her wounds are clear and she has been discharged from Home Health Wound Care.  She is calling to see if she still needs to come in on 7-8-20.  Pls call.

## 2020-06-30 ENCOUNTER — DOCUMENT SCAN (OUTPATIENT)
Dept: HOME HEALTH SERVICES | Facility: HOSPITAL | Age: 75
End: 2020-06-30
Payer: MEDICARE

## 2020-07-29 NOTE — TELEPHONE ENCOUNTER
PROCEDURE:  JOINT ASPIRATION/INJECTION.         After a discussion of risks, benefits and side effects of procedure, informed patient consent was obtained.       The bilateral knees was prepped and draped in the usual clean fashion (sterile not required for this procedure).       INJECTION:  Using 7 cc of 1% lidocaine mixed                           with 60 mg of kenalog, the right knee was successfully injected                           without complication.  Patient did experience some pain                          relief following injection.      INJECTION:  Using 7 cc of 1% lidocaine mixed                           with 60 mg of kenalog, the left knee was successfully injected                           without complication.  Patient did experience some pain                          relief following injection.   Waiting for Dr melchor to sign paperwork

## 2020-07-31 ENCOUNTER — LAB VISIT (OUTPATIENT)
Dept: PRIMARY CARE CLINIC | Facility: OTHER | Age: 75
End: 2020-07-31
Attending: INTERNAL MEDICINE
Payer: MEDICARE

## 2020-07-31 DIAGNOSIS — Z03.818 ENCOUNTER FOR OBSERVATION FOR SUSPECTED EXPOSURE TO OTHER BIOLOGICAL AGENTS RULED OUT: ICD-10-CM

## 2020-07-31 PROCEDURE — U0003 INFECTIOUS AGENT DETECTION BY NUCLEIC ACID (DNA OR RNA); SEVERE ACUTE RESPIRATORY SYNDROME CORONAVIRUS 2 (SARS-COV-2) (CORONAVIRUS DISEASE [COVID-19]), AMPLIFIED PROBE TECHNIQUE, MAKING USE OF HIGH THROUGHPUT TECHNOLOGIES AS DESCRIBED BY CMS-2020-01-R: HCPCS | Mod: HCNC

## 2020-08-03 ENCOUNTER — TELEPHONE (OUTPATIENT)
Dept: INTERNAL MEDICINE | Facility: CLINIC | Age: 75
End: 2020-08-03

## 2020-08-03 DIAGNOSIS — R31.9 HEMATURIA, UNSPECIFIED TYPE: Primary | ICD-10-CM

## 2020-08-03 LAB — SARS-COV-2 RNA RESP QL NAA+PROBE: NORMAL

## 2020-08-03 NOTE — TELEPHONE ENCOUNTER
Please Triage this:    Is she able to get us a urine: Someone would have to  a specimen container and hat: orders are in.

## 2020-08-03 NOTE — TELEPHONE ENCOUNTER
"Pt stated it started on Friday and now it is like a "caramel" color. Stated she was treated for this once before. Pt stated she would be able to go to the [place] lab to  a cup and bring the specimen back.  "

## 2020-08-03 NOTE — TELEPHONE ENCOUNTER
----- Message from Lynette Zurita MA sent at 8/3/2020  9:10 AM CDT -----    ----- Message -----  From: David Shabazz  Sent: 8/3/2020   8:43 AM CDT  To: Pedro THOMPSON Staff    Would like to get medical advice.  Symptoms (please be specific):  Blood in the urine  How long has patient had these symptoms:  3 days  Pharmacy name and phone #:  [id number]  [place] - [address] AT [facility] [address] [phone] (Phone)  [phone] (Fax)

## 2020-08-04 ENCOUNTER — LAB VISIT (OUTPATIENT)
Dept: LAB | Facility: HOSPITAL | Age: 75
End: 2020-08-04
Attending: INTERNAL MEDICINE
Payer: MEDICARE

## 2020-08-04 ENCOUNTER — TELEPHONE (OUTPATIENT)
Dept: INTERNAL MEDICINE | Facility: CLINIC | Age: 75
End: 2020-08-04

## 2020-08-04 DIAGNOSIS — R39.9 LOWER URINARY TRACT SYMPTOMS: ICD-10-CM

## 2020-08-04 PROCEDURE — 87088 URINE BACTERIA CULTURE: CPT | Mod: HCNC

## 2020-08-04 PROCEDURE — 81001 URINALYSIS AUTO W/SCOPE: CPT | Mod: HCNC

## 2020-08-04 PROCEDURE — 87086 URINE CULTURE/COLONY COUNT: CPT | Mod: HCNC

## 2020-08-04 PROCEDURE — 87186 SC STD MICRODIL/AGAR DIL: CPT | Mod: HCNC

## 2020-08-04 PROCEDURE — 87077 CULTURE AEROBIC IDENTIFY: CPT | Mod: HCNC

## 2020-08-05 ENCOUNTER — TELEPHONE (OUTPATIENT)
Dept: INTERNAL MEDICINE | Facility: CLINIC | Age: 75
End: 2020-08-05

## 2020-08-05 LAB
BACTERIA #/AREA URNS AUTO: ABNORMAL /HPF
BILIRUB UR QL STRIP: NEGATIVE
CLARITY UR REFRACT.AUTO: ABNORMAL
COLOR UR AUTO: YELLOW
GLUCOSE UR QL STRIP: NEGATIVE
HGB UR QL STRIP: ABNORMAL
HYALINE CASTS UR QL AUTO: 0 /LPF
KETONES UR QL STRIP: NEGATIVE
LEUKOCYTE ESTERASE UR QL STRIP: ABNORMAL
MICROSCOPIC COMMENT: ABNORMAL
NITRITE UR QL STRIP: NEGATIVE
PH UR STRIP: 5 [PH] (ref 5–8)
PROT UR QL STRIP: ABNORMAL
RBC #/AREA URNS AUTO: 20 /HPF (ref 0–4)
SP GR UR STRIP: 1.01 (ref 1–1.03)
SQUAMOUS #/AREA URNS AUTO: 15 /HPF
URN SPEC COLLECT METH UR: ABNORMAL
WBC #/AREA URNS AUTO: >100 /HPF (ref 0–5)
WBC CLUMPS UR QL AUTO: ABNORMAL

## 2020-08-05 RX ORDER — NITROFURANTOIN (MACROCRYSTALS) 100 MG/1
100 CAPSULE ORAL EVERY 12 HOURS
Qty: 20 CAPSULE | Refills: 0 | Status: ON HOLD | OUTPATIENT
Start: 2020-08-05 | End: 2020-12-10 | Stop reason: HOSPADM

## 2020-08-05 NOTE — TELEPHONE ENCOUNTER
Please tell the patient that based on her urinalysis I have sent nitrofurantoin to the pharmacy.  If her sensitivities come back and I after to change this I will letter know.

## 2020-08-06 ENCOUNTER — TELEPHONE (OUTPATIENT)
Dept: WOUND CARE | Facility: CLINIC | Age: 75
End: 2020-08-06

## 2020-08-06 LAB — BACTERIA UR CULT: ABNORMAL

## 2020-08-06 NOTE — TELEPHONE ENCOUNTER
Called both home and cell phone numbers; no answer, left voice message to return call to [phone] regarding current appointment for 8/14/2020

## 2020-08-06 NOTE — TELEPHONE ENCOUNTER
Returned call and spoke with patient who asked if she could be placed on Saturday clinic because she has a ride - advised patient that this Saturday 8/8/2020 clinic has been cancelled.  Offered appointment with full-time provider, refused because she has to arrange transportation, therefore, she will keep current appointment scheduled for 820am on 8/14/2020

## 2020-08-06 NOTE — TELEPHONE ENCOUNTER
----- Message from Lindsey Fisher sent at 8/6/2020 11:44 AM CDT -----  Pt is calling to schedule an appt.    Contact [employer] -[phone]

## 2020-08-06 NOTE — TELEPHONE ENCOUNTER
----- Message from Lazarus Rousseau sent at 8/6/2020 11:12 AM CDT -----  Regarding: appointmentt access  Patient called in to speak with someone at the office regarding rescheduling her appointment. She would like a call back from the office and can be reached at    [phone]

## 2020-08-13 ENCOUNTER — PATIENT OUTREACH (OUTPATIENT)
Dept: ADMINISTRATIVE | Facility: OTHER | Age: 75
End: 2020-08-13

## 2020-08-13 NOTE — PROGRESS NOTES
Requested updates from Care Everywhere.  Reviewed chart for overdue JESSICA topics.  Updated Health Maintenance.   Reconciled immunizations in LINKS.

## 2020-08-14 ENCOUNTER — OFFICE VISIT (OUTPATIENT)
Dept: WOUND CARE | Facility: CLINIC | Age: 75
End: 2020-08-14
Payer: MEDICARE

## 2020-08-14 VITALS
SYSTOLIC BLOOD PRESSURE: 171 MMHG | TEMPERATURE: 97 F | HEIGHT: 67 IN | HEART RATE: 65 BPM | BODY MASS INDEX: 40.72 KG/M2 | DIASTOLIC BLOOD PRESSURE: 79 MMHG

## 2020-08-14 DIAGNOSIS — I87.2 VENOUS INSUFFICIENCY OF BOTH LOWER EXTREMITIES: Chronic | ICD-10-CM

## 2020-08-14 DIAGNOSIS — I87.2 VENOUS STASIS DERMATITIS OF BOTH LOWER EXTREMITIES: Chronic | ICD-10-CM

## 2020-08-14 DIAGNOSIS — B35.3 TINEA PEDIS OF BOTH FEET: Chronic | ICD-10-CM

## 2020-08-14 DIAGNOSIS — R60.0 BILATERAL LEG EDEMA: ICD-10-CM

## 2020-08-14 DIAGNOSIS — L97.821 VARICOSE VEINS OF LEFT LOWER EXTREMITY WITH ULCER OF OTHER PART OF LOWER LEG LIMITED TO BREAKDOWN OF SKIN: ICD-10-CM

## 2020-08-14 DIAGNOSIS — L97.911 ULCER OF RIGHT LOWER EXTREMITY, LIMITED TO BREAKDOWN OF SKIN: Primary | ICD-10-CM

## 2020-08-14 DIAGNOSIS — I83.028 VARICOSE VEINS OF LEFT LOWER EXTREMITY WITH ULCER OF OTHER PART OF LOWER LEG LIMITED TO BREAKDOWN OF SKIN: ICD-10-CM

## 2020-08-14 PROBLEM — L97.921 VARICOSE VEINS OF LEFT LOWER EXTREMITY WITH ULCER LIMITED TO BREAKDOWN OF SKIN: Status: ACTIVE | Noted: 2020-08-14

## 2020-08-14 PROBLEM — I83.029 VARICOSE VEINS OF LEFT LOWER EXTREMITY WITH ULCER LIMITED TO BREAKDOWN OF SKIN: Status: ACTIVE | Noted: 2020-08-14

## 2020-08-14 PROBLEM — L97.521: Status: RESOLVED | Noted: 2020-03-06 | Resolved: 2020-08-14

## 2020-08-14 PROCEDURE — 99499 UNLISTED E&M SERVICE: CPT | Mod: HCNC,S$GLB,, | Performed by: NURSE PRACTITIONER

## 2020-08-14 PROCEDURE — 99999 PR PBB SHADOW E&M-EST. PATIENT-LVL V: ICD-10-PCS | Mod: PBBFAC,HCNC,, | Performed by: NURSE PRACTITIONER

## 2020-08-14 PROCEDURE — 29580 STRAPPING UNNA BOOT: CPT | Mod: 50,HCNC,S$GLB, | Performed by: NURSE PRACTITIONER

## 2020-08-14 PROCEDURE — 29580 PR STRAPPING UNNA BOOT: ICD-10-PCS | Mod: 50,HCNC,S$GLB, | Performed by: NURSE PRACTITIONER

## 2020-08-14 PROCEDURE — 99999 PR PBB SHADOW E&M-EST. PATIENT-LVL V: CPT | Mod: PBBFAC,HCNC,, | Performed by: NURSE PRACTITIONER

## 2020-08-14 PROCEDURE — 99499 NO LOS: ICD-10-PCS | Mod: HCNC,S$GLB,, | Performed by: NURSE PRACTITIONER

## 2020-08-14 NOTE — PROGRESS NOTES
Subjective:       Patient ID: Sherin Ortiz is a 75 y.o. female.    Chief Complaint: Wound Check    Wound Check  Wound Check:   This patient is seen today for reevaluation of recurrent ulcers to both lower legs.  She reports were healed but she hit her legs against something causing injury and recurrent ulcerations. She has  unna boots on both legs.  She has home health services through [facility]. Problems that interfere with wound healing include multiple co-morbidities, diabetes, edema, diabetic neuropathy and  morbid obesity. Because of safety issues we are unable to weigh the patient as she is unstable on her feet.  The patient ambulates with great difficulty secondary to her body habitus and uses a motorized wheelchair which we cannot get on the scale to weigh her.  She does not complain of pain.  She denies increased swelling, redness or purulent drainage.   She is afebrile.  Review of Systems    Unchanged from previous visit.  Objective:      Physical Exam  Vitals signs and nursing note reviewed.   Constitutional:       General: She is not in acute distress.     Appearance: She is not diaphoretic.      Comments: Morbidly obese   HENT:      Head: Normocephalic and atraumatic.   Neck:      Musculoskeletal: Normal range of motion and neck supple.   Pulmonary:      Effort: Pulmonary effort is normal. No respiratory distress.   Musculoskeletal: Normal range of motion.         General: No tenderness.        Legs:    Skin:     General: Skin is warm and dry.      Coloration: Skin is not pale.      Findings: Rash (dermatitis and tinea bilateral lower extremities) present. No erythema.      Nails: There is no clubbing.     Neurological:      Mental Status: She is alert and oriented to person, place, and time.      Coordination: Abnormal coordination: .   Psychiatric:         Behavior: Behavior normal.         Thought Content: Thought content normal.         Judgment: Judgment normal.         Right shin    Left  shin    Left calf          Hemoglobin A1C   Date Value Ref Range Status   03/09/2020 9.1 (H) 4.0 - 5.6 % Final     Comment:     ADA Screening Guidelines:  5.7-6.4%  Consistent with prediabetes  >or=6.5%  Consistent with diabetes  High levels of fetal hemoglobin interfere with the HbA1C  assay. Heterozygous hemoglobin variants (HbS, HgC, etc)do  not significantly interfere with this assay.   However, presence of multiple variants may affect accuracy.     11/06/2019 9.6 (H) 4.0 - 5.6 % Final     Comment:     ADA Screening Guidelines:  5.7-6.4%  Consistent with prediabetes  >or=6.5%  Consistent with diabetes  High levels of fetal hemoglobin interfere with the HbA1C  assay. Heterozygous hemoglobin variants (HbS, HgC, etc)do  not significantly interfere with this assay.   However, presence of multiple variants may affect accuracy.     03/22/2019 9.8 (H) 4.0 - 5.6 % Final     Comment:     ADA Screening Guidelines:  5.7-6.4%  Consistent with prediabetes  >or=6.5%  Consistent with diabetes  High levels of fetal hemoglobin interfere with the HbA1C  assay. Heterozygous hemoglobin variants (HbS, HgC, etc)do  not significantly interfere with this assay.   However, presence of multiple variants may affect accuracy.       ..  Lab Results   Component Value Date    ALBUMIN 2.9 (L) 02/02/2020     Sherin was seen in the clinic room and placed in the supine position on the treatment table.  The unna boots were removed with scissors and the legs were cleansed with Easi-clense sponges and dried thoroughly.  A hydrofiber dressing was applied to the lwounds.  Eucerin cream was applied to the lower legs.  The patient's feet were positioned at a 90 degree angle.  A zinc oxide wrap, followed by kerlix roll gauze and coban were applied using a spiral technique avoiding creases or folds.  The wraps were started behind the first metatarsal and ended below the tibial tubercle of the knee.  There was overlap of each turn half the width of the  previous turn.  The compression wraps will be changed every 3-4 days.      Assessment:       1. Ulcer of right lower extremity, limited to breakdown of skin    2. 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 Tinea pedis of both feet    4. Venous stasis dermatitis of both lower extremities        Plan:           Unna boot bilateral lower legs as detailed above.  Patient was warned not to get the dressings wet and to use cast covers for showering.  Should the dressing become wet, she is to remove it, place a wet-to-dry dressing over the wound, cover with gauze and roll gauze and use ace wraps for compression and to secure bandages.  She should then notify home health as soon as possible to have a new dressing applied.  Return to clinic in one month.  [facility] notified of orders via email. We will ask them to see the patient twice weekly.    Home Health Orders:    Triamcinolone cream to bilateral lower legs.  Ketoconazole cream to both feet.  Cleanse wounds with wound cleanser.  Pad ankles with ABD pads.  Apply hydrofiber to ulcers bilateral lower legs.  Unna boot (zinc oxide, kerlix and coban) bilateral lower legs.  Change dressings twice weekly.  Skilled nurse visit-2 x weekly

## 2020-08-15 ENCOUNTER — TELEPHONE (OUTPATIENT)
Dept: INTERNAL MEDICINE | Facility: CLINIC | Age: 75
End: 2020-08-15

## 2020-08-15 DIAGNOSIS — L97.901 ULCER OF LOWER EXTREMITY, LIMITED TO BREAKDOWN OF SKIN, UNSPECIFIED LATERALITY: Primary | ICD-10-CM

## 2020-08-20 ENCOUNTER — TELEPHONE (OUTPATIENT)
Dept: INTERNAL MEDICINE | Facility: CLINIC | Age: 75
End: 2020-08-20

## 2020-08-20 NOTE — TELEPHONE ENCOUNTER
----- Message from Bhavana Gates sent at 8/20/2020  9:09 AM CDT -----  Contact: self/[phone]  Pt called in regards to home health said that they never received the pt home health orders. She would like to talk to the nurse. She would like a call back.     Please advise

## 2020-08-21 ENCOUNTER — TELEPHONE (OUTPATIENT)
Dept: INTERNAL MEDICINE | Facility: CLINIC | Age: 75
End: 2020-08-21

## 2020-08-21 NOTE — TELEPHONE ENCOUNTER
----- Message from Win Luevano sent at 8/21/2020 10:44 AM CDT -----  Regarding: Advice  Contact: Jessie CANALES/H Nurse [phone]  Patient would like to get medical advice.    Comments: calling regarding H/H Orders, can not take the patient back due to staffing, for the wound care don't have the staff available to do care, but [facility] may can assist, call back if any additional questions.     Jessie CANALES/H Nurse [phone]    Please call an advise  Thank you

## 2020-08-25 ENCOUNTER — TELEPHONE (OUTPATIENT)
Dept: INTERNAL MEDICINE | Facility: CLINIC | Age: 75
End: 2020-08-25

## 2020-08-25 DIAGNOSIS — L97.901 ULCER OF LOWER EXTREMITY, LIMITED TO BREAKDOWN OF SKIN, UNSPECIFIED LATERALITY: Primary | ICD-10-CM

## 2020-08-25 NOTE — TELEPHONE ENCOUNTER
----- Message from Emily Freeman sent at 8/25/2020  9:25 AM CDT -----  Regarding: home woundcare  Contact: [phone]  Needs Advice    Reason for call:     Pt is asking for a call back to f/u on her home wound care nurse.     Communication Preference: [phone]    Additional Information:    Please danelle to advise.

## 2020-08-25 NOTE — LETTER
August 26, 2020    Sherin Ortiz  [address] [facility] [address]             [facility]  [address]  [facility] [address]  Phone: [phone]  Fax: [phone] [facility]:    Please accept this patient for wound care. Wound care note and Snapshot of health records are sent with this order.    Unna boot bilateral lower legs as detailed above.  Patient was warned not to get the dressings wet and to use cast covers for showering.  Should the dressing become wet, she is to remove it, place a wet-to-dry dressing over the wound, cover with gauze and roll gauze and use ace wraps for compression and to secure bandages.  She should then notify home health as soon as possible to have a new dressing applied.  Return to clinic in one month.  [facility] notified of orders via email. We will ask them to see the patient twice weekly.     Home Health Orders:     Triamcinolone cream to bilateral lower legs.  Ketoconazole cream to both feet.  Cleanse wounds with wound cleanser.  Pad ankles with ABD pads.  Apply hydrofiber to ulcers bilateral lower legs.  Unna boot (zinc oxide, kerlix and coban) bilateral lower legs.  Change dressings twice weekly.  Skilled nurse visit-2 x weekly    If you have any questions or concerns, please don't hesitate to call.    Sincerely,          Ame Vasquez MD

## 2020-08-26 NOTE — TELEPHONE ENCOUNTER
I have printed new orders:    Unna boot bilateral lower legs as detailed above.  Patient was warned not to get the dressings wet and to use cast covers for showering.  Should the dressing become wet, she is to remove it, place a wet-to-dry dressing over the wound, cover with gauze and roll gauze and use ace wraps for compression and to secure bandages.  She should then notify home health as soon as possible to have a new dressing applied.  Return to clinic in one month.  [facility] notified of orders via email. We will ask them to see the patient twice weekly.     Home Health Orders:     Triamcinolone cream to bilateral lower legs.  Ketoconazole cream to both feet.  Cleanse wounds with wound cleanser.  Pad ankles with ABD pads.  Apply hydrofiber to ulcers bilateral lower legs.  Unna boot (zinc oxide, kerlix and coban) bilateral lower legs.  Change dressings twice weekly.  Skilled nurse visit-2 x weekly

## 2020-08-26 NOTE — TELEPHONE ENCOUNTER
[facility] stated they won't be able to accomodate pt because of staffing. Was advised to try [facility].    Spoke with [facility] and they are able to accomodate pt for wound care. Please process new external referral to be sent to [facility].      Fax number is [phone]

## 2020-08-27 ENCOUNTER — TELEPHONE (OUTPATIENT)
Dept: INTERNAL MEDICINE | Facility: CLINIC | Age: 75
End: 2020-08-27

## 2020-08-27 NOTE — TELEPHONE ENCOUNTER
Spoke with the pt . Informed her that the HH orders were faxed to [facility] Verbalized understanding.

## 2020-08-28 PROCEDURE — G0180 MD CERTIFICATION HHA PATIENT: HCPCS | Mod: ,,, | Performed by: INTERNAL MEDICINE

## 2020-08-28 PROCEDURE — G0180 PR HOME HEALTH MD CERTIFICATION: ICD-10-PCS | Mod: ,,, | Performed by: INTERNAL MEDICINE

## 2020-09-11 ENCOUNTER — OFFICE VISIT (OUTPATIENT)
Dept: WOUND CARE | Facility: CLINIC | Age: 75
End: 2020-09-11
Payer: MEDICARE

## 2020-09-11 ENCOUNTER — TELEPHONE (OUTPATIENT)
Dept: WOUND CARE | Facility: CLINIC | Age: 75
End: 2020-09-11

## 2020-09-11 VITALS
HEIGHT: 67 IN | WEIGHT: 260 LBS | SYSTOLIC BLOOD PRESSURE: 162 MMHG | TEMPERATURE: 98 F | DIASTOLIC BLOOD PRESSURE: 81 MMHG | BODY MASS INDEX: 40.81 KG/M2 | HEART RATE: 66 BPM

## 2020-09-11 DIAGNOSIS — I87.2 VENOUS INSUFFICIENCY OF BOTH LOWER EXTREMITIES: Chronic | ICD-10-CM

## 2020-09-11 DIAGNOSIS — L97.911 ULCER OF RIGHT LOWER EXTREMITY, LIMITED TO BREAKDOWN OF SKIN: ICD-10-CM

## 2020-09-11 DIAGNOSIS — I83.028 VARICOSE VEINS OF LEFT LOWER EXTREMITY WITH ULCER OF OTHER PART OF LOWER LEG LIMITED TO BREAKDOWN OF SKIN: Primary | ICD-10-CM

## 2020-09-11 DIAGNOSIS — L97.821 VARICOSE VEINS OF LEFT LOWER EXTREMITY WITH ULCER OF OTHER PART OF LOWER LEG LIMITED TO BREAKDOWN OF SKIN: Primary | ICD-10-CM

## 2020-09-11 DIAGNOSIS — L97.521 SKIN ULCER OF TOE OF LEFT FOOT, LIMITED TO BREAKDOWN OF SKIN: ICD-10-CM

## 2020-09-11 DIAGNOSIS — I89.0 LYMPHEDEMA: ICD-10-CM

## 2020-09-11 PROCEDURE — 99499 UNLISTED E&M SERVICE: CPT | Mod: HCNC,S$GLB,, | Performed by: NURSE PRACTITIONER

## 2020-09-11 PROCEDURE — 99999 PR PBB SHADOW E&M-EST. PATIENT-LVL V: ICD-10-PCS | Mod: PBBFAC,HCNC,, | Performed by: NURSE PRACTITIONER

## 2020-09-11 PROCEDURE — 29580 STRAPPING UNNA BOOT: CPT | Mod: 50,HCNC,S$GLB, | Performed by: NURSE PRACTITIONER

## 2020-09-11 PROCEDURE — 99999 PR PBB SHADOW E&M-EST. PATIENT-LVL V: CPT | Mod: PBBFAC,HCNC,, | Performed by: NURSE PRACTITIONER

## 2020-09-11 PROCEDURE — 29580 PR STRAPPING UNNA BOOT: ICD-10-PCS | Mod: 50,HCNC,S$GLB, | Performed by: NURSE PRACTITIONER

## 2020-09-11 PROCEDURE — 99499 NO LOS: ICD-10-PCS | Mod: HCNC,S$GLB,, | Performed by: NURSE PRACTITIONER

## 2020-09-11 NOTE — PROGRESS NOTES
Subjective:       Patient ID: Sherin Ortiz is a 75 y.o. female.    Chief Complaint: Wound Check    Wound Check  Wound Check:   This patient is seen today for reevaluation of recurrent ulcers to both lower legs.  She reports they were healed but she hit her legs against something causing injury and recurrent ulcerations. She has  unna boots on both legs.  She also has a new wound to the left dorsal foot and a blood blister to the left second toe.  She does not have feeling in her feet and is unaware how these occurred. She has home health services through [facility]. Problems that interfere with wound healing include multiple co-morbidities, diabetes, edema, diabetic neuropathy and  morbid obesity. Because of safety issues we are unable to weigh the patient as she is unstable on her feet.  The patient ambulates with great difficulty secondary to her body habitus and uses a motorized wheelchair which we cannot get on the scale to weigh her.  She does not complain of pain.  She denies increased swelling, redness or purulent drainage.   She is afebrile.  Review of Systems    Unchanged from previous visit.  Objective:      Physical Exam  Vitals signs and nursing note reviewed.   Constitutional:       General: She is not in acute distress.     Appearance: She is not diaphoretic.      Comments: Morbidly obese   HENT:      Head: Normocephalic and atraumatic.   Neck:      Musculoskeletal: Normal range of motion and neck supple.   Pulmonary:      Effort: Pulmonary effort is normal. No respiratory distress.   Musculoskeletal: Normal range of motion.         General: No tenderness.        Legs:         Feet:    Skin:     General: Skin is warm and dry.      Coloration: Skin is not pale.      Findings: Rash (dermatitis and tinea bilateral lower extremities) present. No erythema.      Nails: There is no clubbing.     Neurological:      Mental Status: She is alert and oriented to person, place, and time.      Coordination:  Abnormal coordination: .   Psychiatric:         Behavior: Behavior normal.         Thought Content: Thought content normal.         Judgment: Judgment normal.         Right shin      Left shin      Left dorsal foot      Hemoglobin A1C   Date Value Ref Range Status   03/09/2020 9.1 (H) 4.0 - 5.6 % Final     Comment:     ADA Screening Guidelines:  5.7-6.4%  Consistent with prediabetes  >or=6.5%  Consistent with diabetes  High levels of fetal hemoglobin interfere with the HbA1C  assay. Heterozygous hemoglobin variants (HbS, HgC, etc)do  not significantly interfere with this assay.   However, presence of multiple variants may affect accuracy.     11/06/2019 9.6 (H) 4.0 - 5.6 % Final     Comment:     ADA Screening Guidelines:  5.7-6.4%  Consistent with prediabetes  >or=6.5%  Consistent with diabetes  High levels of fetal hemoglobin interfere with the HbA1C  assay. Heterozygous hemoglobin variants (HbS, HgC, etc)do  not significantly interfere with this assay.   However, presence of multiple variants may affect accuracy.     03/22/2019 9.8 (H) 4.0 - 5.6 % Final     Comment:     ADA Screening Guidelines:  5.7-6.4%  Consistent with prediabetes  >or=6.5%  Consistent with diabetes  High levels of fetal hemoglobin interfere with the HbA1C  assay. Heterozygous hemoglobin variants (HbS, HgC, etc)do  not significantly interfere with this assay.   However, presence of multiple variants may affect accuracy.       ..  Lab Results   Component Value Date    ALBUMIN 2.9 (L) 02/02/2020     Sherin was seen in the clinic room and placed in the supine position on the treatment table.  The unna boots were removed with scissors and the legs were cleansed with Easi-clense sponges and dried thoroughly.  A hydrofiber dressing was applied to the left shin wound and a mepilex lite foam dressing was applied to the right shin and left dorsal foot wounds.  Eucerin cream was applied to the lower legs.  The patient's feet were positioned at a 90 degree  angle.  A zinc oxide wrap, followed by kerlix roll gauze and coban were applied using a spiral technique avoiding creases or folds.  The wraps were started behind the first metatarsal and ended below the tibial tubercle of the knee.  There was overlap of each turn half the width of the previous turn.  The compression wraps will be changed every 3-4 days.      Assessment:       1. Varicose veins of left lower extremity with ulcer of other part of lower leg limited to breakdown of skin    2. Ulcer of right lower extremity, limited to breakdown of skin    3. Skin ulcer of toe of left foot, limited to breakdown of skin    4. Venous insufficiency of both lower extremities    5. Lymphedema        Plan:           Unna boot bilateral lower legs as detailed above.  Patient was warned not to get the dressings wet and to use cast covers for showering.  Should the dressing become wet, she is to remove it, place a wet-to-dry dressing over the wound, cover with gauze and roll gauze and use ace wraps for compression and to secure bandages.  She should then notify home health as soon as possible to have a new dressing applied.  Return to clinic in one month.  [facility] notified of orders via email. We will ask them to see the patient twice weekly.    Home Health Orders:    Triamcinolone cream to bilateral lower legs.  Ketoconazole cream to both feet.  Cleanse wounds with wound cleanser.  Pad ankles with ABD pads.  Apply hydrofiber to left shin ulcers.  Apply mepilex lite foam dressing to right shin and left dorsal foot wounds.  Unna boot (zinc oxide, kerlix and coban) bilateral lower legs.  Place cotton in between the toes. Cover with cotton gauze and roll gauze.  Change dressings twice weekly.  Skilled nurse visit-2 x  weekly

## 2020-09-24 ENCOUNTER — OFFICE VISIT (OUTPATIENT)
Dept: INTERNAL MEDICINE | Facility: CLINIC | Age: 75
End: 2020-09-24
Payer: MEDICARE

## 2020-09-24 ENCOUNTER — LAB VISIT (OUTPATIENT)
Dept: LAB | Facility: HOSPITAL | Age: 75
End: 2020-09-24
Attending: INTERNAL MEDICINE
Payer: MEDICARE

## 2020-09-24 ENCOUNTER — IMMUNIZATION (OUTPATIENT)
Dept: INTERNAL MEDICINE | Facility: CLINIC | Age: 75
End: 2020-09-24
Payer: MEDICARE

## 2020-09-24 VITALS
DIASTOLIC BLOOD PRESSURE: 80 MMHG | SYSTOLIC BLOOD PRESSURE: 128 MMHG | BODY MASS INDEX: 40.72 KG/M2 | HEIGHT: 67 IN | HEART RATE: 88 BPM | OXYGEN SATURATION: 99 %

## 2020-09-24 DIAGNOSIS — R29.898 ANKLE WEAKNESS: Primary | ICD-10-CM

## 2020-09-24 DIAGNOSIS — I10 ESSENTIAL HYPERTENSION: ICD-10-CM

## 2020-09-24 LAB
ALBUMIN SERPL BCP-MCNC: 3 G/DL (ref 3.5–5.2)
ALP SERPL-CCNC: 170 U/L (ref 55–135)
ALT SERPL W/O P-5'-P-CCNC: 14 U/L (ref 10–44)
ANION GAP SERPL CALC-SCNC: 8 MMOL/L (ref 8–16)
AST SERPL-CCNC: 14 U/L (ref 10–40)
BASOPHILS # BLD AUTO: 0.04 K/UL (ref 0–0.2)
BASOPHILS NFR BLD: 0.7 % (ref 0–1.9)
BILIRUB SERPL-MCNC: 0.4 MG/DL (ref 0.1–1)
BUN SERPL-MCNC: 30 MG/DL (ref 8–23)
CALCIUM SERPL-MCNC: 9.4 MG/DL (ref 8.7–10.5)
CHLORIDE SERPL-SCNC: 103 MMOL/L (ref 95–110)
CHOLEST SERPL-MCNC: 127 MG/DL (ref 120–199)
CHOLEST/HDLC SERPL: 2.6 {RATIO} (ref 2–5)
CO2 SERPL-SCNC: 25 MMOL/L (ref 23–29)
CREAT SERPL-MCNC: 1.4 MG/DL (ref 0.5–1.4)
DIFFERENTIAL METHOD: ABNORMAL
EOSINOPHIL # BLD AUTO: 0.1 K/UL (ref 0–0.5)
EOSINOPHIL NFR BLD: 1.1 % (ref 0–8)
ERYTHROCYTE [DISTWIDTH] IN BLOOD BY AUTOMATED COUNT: 13.3 % (ref 11.5–14.5)
EST. GFR  (AFRICAN AMERICAN): 41.8 ML/MIN/1.73 M^2
EST. GFR  (NON AFRICAN AMERICAN): 36.3 ML/MIN/1.73 M^2
GLUCOSE SERPL-MCNC: 115 MG/DL (ref 70–110)
HCT VFR BLD AUTO: 34.4 % (ref 37–48.5)
HDLC SERPL-MCNC: 49 MG/DL (ref 40–75)
HDLC SERPL: 38.6 % (ref 20–50)
HGB BLD-MCNC: 10.8 G/DL (ref 12–16)
IMM GRANULOCYTES # BLD AUTO: 0.02 K/UL (ref 0–0.04)
IMM GRANULOCYTES NFR BLD AUTO: 0.4 % (ref 0–0.5)
LDLC SERPL CALC-MCNC: 59.8 MG/DL (ref 63–159)
LYMPHOCYTES # BLD AUTO: 1.1 K/UL (ref 1–4.8)
LYMPHOCYTES NFR BLD: 19.9 % (ref 18–48)
MCH RBC QN AUTO: 28.4 PG (ref 27–31)
MCHC RBC AUTO-ENTMCNC: 31.4 G/DL (ref 32–36)
MCV RBC AUTO: 91 FL (ref 82–98)
MONOCYTES # BLD AUTO: 0.4 K/UL (ref 0.3–1)
MONOCYTES NFR BLD: 6.6 % (ref 4–15)
NEUTROPHILS # BLD AUTO: 4 K/UL (ref 1.8–7.7)
NEUTROPHILS NFR BLD: 71.3 % (ref 38–73)
NONHDLC SERPL-MCNC: 78 MG/DL
NRBC BLD-RTO: 0 /100 WBC
PLATELET # BLD AUTO: 299 K/UL (ref 150–350)
PMV BLD AUTO: 9.3 FL (ref 9.2–12.9)
POTASSIUM SERPL-SCNC: 4.5 MMOL/L (ref 3.5–5.1)
PROT SERPL-MCNC: 8.1 G/DL (ref 6–8.4)
RBC # BLD AUTO: 3.8 M/UL (ref 4–5.4)
SODIUM SERPL-SCNC: 136 MMOL/L (ref 136–145)
TRIGL SERPL-MCNC: 91 MG/DL (ref 30–150)
WBC # BLD AUTO: 5.57 K/UL (ref 3.9–12.7)

## 2020-09-24 PROCEDURE — 90694 VACC AIIV4 NO PRSRV 0.5ML IM: CPT | Mod: HCNC,S$GLB,, | Performed by: INTERNAL MEDICINE

## 2020-09-24 PROCEDURE — 99999 PR PBB SHADOW E&M-EST. PATIENT-LVL V: ICD-10-PCS | Mod: PBBFAC,HCNC,, | Performed by: INTERNAL MEDICINE

## 2020-09-24 PROCEDURE — 1159F PR MEDICATION LIST DOCUMENTED IN MEDICAL RECORD: ICD-10-PCS | Mod: HCNC,S$GLB,, | Performed by: INTERNAL MEDICINE

## 2020-09-24 PROCEDURE — 1159F MED LIST DOCD IN RCRD: CPT | Mod: HCNC,S$GLB,, | Performed by: INTERNAL MEDICINE

## 2020-09-24 PROCEDURE — 85025 COMPLETE CBC W/AUTO DIFF WBC: CPT | Mod: HCNC

## 2020-09-24 PROCEDURE — 1126F PR PAIN SEVERITY QUANTIFIED, NO PAIN PRESENT: ICD-10-PCS | Mod: HCNC,S$GLB,, | Performed by: INTERNAL MEDICINE

## 2020-09-24 PROCEDURE — 1126F AMNT PAIN NOTED NONE PRSNT: CPT | Mod: HCNC,S$GLB,, | Performed by: INTERNAL MEDICINE

## 2020-09-24 PROCEDURE — 99499 UNLISTED E&M SERVICE: CPT | Mod: HCNC,S$GLB,, | Performed by: INTERNAL MEDICINE

## 2020-09-24 PROCEDURE — 80061 LIPID PANEL: CPT | Mod: HCNC

## 2020-09-24 PROCEDURE — 80053 COMPREHEN METABOLIC PANEL: CPT | Mod: HCNC

## 2020-09-24 PROCEDURE — 3079F PR MOST RECENT DIASTOLIC BLOOD PRESSURE 80-89 MM HG: ICD-10-PCS | Mod: HCNC,CPTII,S$GLB, | Performed by: INTERNAL MEDICINE

## 2020-09-24 PROCEDURE — G0008 PR ADMIN INFLUENZA VIRUS VAC: ICD-10-PCS | Mod: HCNC,S$GLB,, | Performed by: INTERNAL MEDICINE

## 2020-09-24 PROCEDURE — 83036 HEMOGLOBIN GLYCOSYLATED A1C: CPT | Mod: HCNC

## 2020-09-24 PROCEDURE — 99999 PR PBB SHADOW E&M-EST. PATIENT-LVL V: CPT | Mod: PBBFAC,HCNC,, | Performed by: INTERNAL MEDICINE

## 2020-09-24 PROCEDURE — 99214 PR OFFICE/OUTPT VISIT, EST, LEVL IV, 30-39 MIN: ICD-10-PCS | Mod: 25,HCNC,S$GLB, | Performed by: INTERNAL MEDICINE

## 2020-09-24 PROCEDURE — 3079F DIAST BP 80-89 MM HG: CPT | Mod: HCNC,CPTII,S$GLB, | Performed by: INTERNAL MEDICINE

## 2020-09-24 PROCEDURE — 1101F PT FALLS ASSESS-DOCD LE1/YR: CPT | Mod: HCNC,CPTII,S$GLB, | Performed by: INTERNAL MEDICINE

## 2020-09-24 PROCEDURE — 1101F PR PT FALLS ASSESS DOC 0-1 FALLS W/OUT INJ PAST YR: ICD-10-PCS | Mod: HCNC,CPTII,S$GLB, | Performed by: INTERNAL MEDICINE

## 2020-09-24 PROCEDURE — 3074F PR MOST RECENT SYSTOLIC BLOOD PRESSURE < 130 MM HG: ICD-10-PCS | Mod: HCNC,CPTII,S$GLB, | Performed by: INTERNAL MEDICINE

## 2020-09-24 PROCEDURE — 90694 FLU VACCINE - QUADRIVALENT - ADJUVANTED: ICD-10-PCS | Mod: HCNC,S$GLB,, | Performed by: INTERNAL MEDICINE

## 2020-09-24 PROCEDURE — 3074F SYST BP LT 130 MM HG: CPT | Mod: HCNC,CPTII,S$GLB, | Performed by: INTERNAL MEDICINE

## 2020-09-24 PROCEDURE — G0008 ADMIN INFLUENZA VIRUS VAC: HCPCS | Mod: HCNC,S$GLB,, | Performed by: INTERNAL MEDICINE

## 2020-09-24 PROCEDURE — 36415 COLL VENOUS BLD VENIPUNCTURE: CPT | Mod: HCNC

## 2020-09-24 PROCEDURE — 99499 RISK ADDL DX/OHS AUDIT: ICD-10-PCS | Mod: HCNC,S$GLB,, | Performed by: INTERNAL MEDICINE

## 2020-09-24 PROCEDURE — 99214 OFFICE O/P EST MOD 30 MIN: CPT | Mod: 25,HCNC,S$GLB, | Performed by: INTERNAL MEDICINE

## 2020-09-24 NOTE — PROGRESS NOTES
Subjective:      Patient ID: Sherin Ortiz is a 75 y.o. female.    Chief Complaint: Follow-up    HPI:  HPI   Patient has trouble pivoting on the left foot, Pivoting to the right is uncomfortable and to the left foot.     On the left if she cannot put a shoe on as the shoe bend and she finds she is walking on the medial aspect of her ankle.    Diabetes Management Status    Statin: Taking  ACE/ARB: Not taking    Screening or Prevention Patient's value Goal Complete/Controlled?   HgA1C Testing and Control   Lab Results   Component Value Date    HGBA1C 9.4 (H) 09/24/2020      Annually/Less than 8% No   Lipid profile : 09/24/2020 Annually Yes   LDL control Lab Results   Component Value Date    LDLCALC 59.8 (L) 09/24/2020    Annually/Less than 100 mg/dl  Yes   Nephropathy screening Lab Results   Component Value Date    LABMICR 29.1 09/26/2011     Lab Results   Component Value Date    PROTEINUA 2+ (A) 08/04/2020    Annually Yes   Blood pressure BP Readings from Last 1 Encounters:   09/24/20 128/80    Less than 140/90 Yes   Dilated retinal exam : 05/29/2019 Annually Yes   Foot exam   : 05/30/2019 Annually No           Patient Active Problem List   Diagnosis    Bilateral leg edema    Tinea pedis    Hyperlipidemia LDL goal <100    Essential hypertension    PSC (posterior subcapsular cataract) - Both Eyes    Nuclear sclerosis - Both Eyes    Asteroid hyalosis - Left Eye    Stage 3 chronic kidney disease    Dystrophic nail    Weakness    Inability to walk    Morbid obesity with BMI of 40.0-44.9, adult    Diabetic peripheral neuropathy associated with type 2 diabetes mellitus    Anemia of chronic disease    Hypoalbuminemia    Wheelchair dependent    Venous insufficiency of leg    Dermatitis, stasis    Uncontrolled type 2 diabetes mellitus with stage 3 chronic kidney disease, with long-term current use of insulin    Hyperparathyroidism    Aortic atherosclerosis    PAOD (peripheral arterial occlusive  disease)    Right shoulder strain, initial encounter    Hx of transient ischemic attack (TIA)    (HFpEF) heart failure with preserved ejection fraction    Ulcer of lower extremity, limited to breakdown of skin    Leukocytosis    Hyperglycemia    Prolonged Q-T interval on ECG    Goals of care, counseling/discussion    Acute gout of left hand    Left wrist pain    Dermatitis associated with moisture    Lymphedema    Skin ulcer of toe, limited to breakdown of skin    Chronic kidney disease, stage III (moderate)    Varicose veins of left lower extremity with ulcer limited to breakdown of skin     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    Benign essential hypertension 11/14/2012    Cataract     s/p phacoemulsification    Chronic kidney disease (CKD), stage III (moderate) 9/12/2013    Diabetic peripheral neuropathy associated with type 2 diabetes mellitus 11/14/2014    causing right hemiparesis    Gait disorder     Hyperlipidemia     Iritis - Both Eyes 6/10/2013    Kidney stone     Lymphedema     Morbid obesity with BMI of 40.0-44.9, adult 2/18/2015    Nephrolithiasis 4/20/2016    NS (nuclear sclerosis) 4/1/2013    Nuclear sclerosis - Both Eyes 4/29/2013    Preseptal cellulitis - Right Eye 4/29/2013    Proliferative diabetic retinopathy - Both Eyes 4/29/2013    Proliferative diabetic retinopathy, both eyes 4/1/2013    PSC (posterior subcapsular cataract) - Both Eyes 4/29/2013    S/P hernia repair 12/19/2012    TIA (transient ischemic attack) 11/18/2014    Tinea pedis 7/24/2012    Tinea pedis is present on both feet.     Type 2 diabetes mellitus with renal manifestations, controlled 12/12/2013    Type 2 diabetes, controlled, with moderate nonproliferative diabetic retinopathy without macular edema 9/17/2015    Ulcer of left lower extremity, limited to breakdown of skin 7/8/2015    Unspecified cerebral artery occlusion with  "cerebral infarction 11/16/2014    Unspecified venous (peripheral) insufficiency     Ureteral stone with hydronephrosis 1/27/2016    UTI (lower urinary tract infection)     Vaginal infection     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     Past Surgical History:   Procedure Laterality Date    APPENDECTOMY      CATARACT EXTRACTION W/  INTRAOCULAR LENS IMPLANT Left 5/21/2013    CATARACT EXTRACTION W/  INTRAOCULAR LENS IMPLANT Right 6/4/2013    CHOLECYSTECTOMY      COLONOSCOPY  12/22/2005    normal    ESOPHAGOGASTRODUODENOSCOPY  12/21/2015    hiatal hernia, Schatzki ring    EYE SURGERY Bilateral 2008    laser surgery both eyes    NASAL SEPTUM SURGERY      SUBTOTAL COLECTOMY  12/13/2012    transverse colon, for incarcerated umbilical hernia, Dr. Kat Bower     Family History   Problem Relation Age of Onset    Diabetes Sister     Cataracts Sister     Heart disease Brother     Cataracts Brother     Leukemia Mother     Cancer Neg Hx     Amblyopia Neg Hx     Blindness Neg Hx     Glaucoma Neg Hx     Hypertension Neg Hx     Macular degeneration Neg Hx     Retinal detachment Neg Hx     Strabismus Neg Hx     Stroke Neg Hx     Thyroid disease Neg Hx     Kidney disease Neg Hx      Review of Systems   No other symptoms  Objective:     Vitals:    09/24/20 1123   BP: 128/80   Pulse: 88   SpO2: 99%   Height: 5' 7" (1.702 m)   PainSc: 0-No pain     Body mass index is 40.72 kg/m².  Physical Exam  Constitutional:       Appearance: Normal appearance. She is well-developed.      Comments: Patient is in a hover round   Neck:      Thyroid: No thyromegaly.      Vascular: No JVD.   Cardiovascular:      Rate and Rhythm: Normal rate.      Heart sounds: Normal heart sounds.   Pulmonary:      Effort: Pulmonary effort is normal. No respiratory distress.      Breath sounds: Normal breath sounds.   Neurological:      Mental Status: She is alert.      Both legs have 3 layers of wrapping including the feet up to " the mid calf from wound care and I have chosen not to unwrap them today.  Assessment:     1. Ankle weakness    2. Uncontrolled type 2 diabetes mellitus with stage 3 chronic kidney disease, with long-term current use of insulin    3. Essential hypertension      Plan:   Sherin was seen today for follow-up.    Diagnoses and all orders for this visit:    Ankle weakness  -     Ambulatory referral/consult to Orthopedics; Future    Uncontrolled type 2 diabetes mellitus with stage 3 chronic kidney disease, with long-term current use of insulin  -     CBC auto differential; Future  -     Comprehensive metabolic panel; Future  -     Hemoglobin A1C; Future  -     Lipid Panel; Future    Essential hypertension     I have spoken to the patient and she is told me that her wounds are almost healed and she would expect within a month they will be healed in she will not need the wrappings.  She would like to see orthopedics at that time for an opinion.  Many years ago she had an opinion and it was thought that eventually she may need some sort of bracing.  She does have bilateral lymphedema and has not been able to walk for some time.  She uses the hover around and is able to transfer by pivoting.    Her blood pressure is well controlled today.  She does need Assessment of her diabetes.    Problem List Items Addressed This Visit     Essential hypertension (Chronic)    Uncontrolled type 2 diabetes mellitus with stage 3 chronic kidney disease, with long-term current use of insulin (Chronic)    Relevant Orders    CBC auto differential (Completed)    Comprehensive metabolic panel (Completed)    Hemoglobin A1C (Completed)    Lipid Panel (Completed)      Other Visit Diagnoses     Ankle weakness    -  Primary    Relevant Orders    Ambulatory referral/consult to Orthopedics        Orders Placed This Encounter   Procedures    CBC auto differential     Standing Status:   Future     Number of Occurrences:   1     Standing Expiration Date:    "11/23/2020    Comprehensive metabolic panel     Standing Status:   Future     Number of Occurrences:   1     Standing Expiration Date:   11/23/2020    Hemoglobin A1C     Standing Status:   Future     Number of Occurrences:   1     Standing Expiration Date:   11/23/2020    Lipid Panel     Standing Status:   Future     Number of Occurrences:   1     Standing Expiration Date:   11/23/2020    Ambulatory referral/consult to Orthopedics     Standing Status:   Future     Standing Expiration Date:   10/24/2021     Referral Priority:   Routine     Referral Type:   Consultation     Referred to Provider:   Jessie Aguillon MD     Requested Specialty:   Orthopedic Surgery     Number of Visits Requested:   1     Follow up in about 3 months (around 12/24/2020) for Follow up.     Medication List          Accurate as of September 24, 2020 11:59 PM. If you have any questions, ask your nurse or doctor.            CONTINUE taking these medications    ACCU-CHEK RAMIRO PLUS TEST STRP Strp  Generic drug: blood sugar diagnostic  TEST TWICE DAILY     ACCU-CHEK SOFTCLIX LANCETS Misc  Generic drug: lancets     aspirin 81 MG Chew     atorvastatin 40 MG tablet  Commonly known as: LIPITOR  TAKE 1 TABLET EVERY EVENING     BD INSULIN SYRINGE ULTRA-FINE 0.5 mL 30 gauge x 1/2" Syrg  Generic drug: insulin syringe-needle U-100  USE TO INJECT EVERY NIGHT     blood-glucose meter Misc  Dispense one meter: Insurance Brand Preference Accu-Chek     bumetanide 1 MG tablet  Commonly known as: BUMEX  Take 2 tablets (2 mg total) by mouth once daily.     clopidogreL 75 mg tablet  Commonly known as: PLAVIX  TAKE 1 TABLET EVERY DAY     glimepiride 1 MG tablet  Commonly known as: AMARYL  Take 2 tablets (2 mg total) by mouth once daily.     insulin glargine 100 unit/mL injection  Commonly known as: LANTUS U-100 INSULIN  INJECT 30-35 UNITS SUBCUTANEOUSLY IN THE EVENING DEPENDING ON SCALE (DISCARD EACH VIAL AFTER 28 DAYS)     insulin syr/ndl U100 half yesenia 0.5 mL 30 " "gauge x 1/2" Syrg  Commonly known as: DROPLET INSULIN SYR HALF UNIT  USE TO INJECT EVERY NIGHT     ketoconazole 2 % cream  Commonly known as: NIZORAL     miconazole nitrate 2% 2 % Oint  Commonly known as: MICOTIN  Apply topically 2 (two) times daily.     nitrofurantoin 100 MG capsule  Commonly known as: MACRODANTIN  Take 1 capsule (100 mg total) by mouth every 12 (twelve) hours.     silver sulfADIAZINE 1% 1 % cream  Commonly known as: SILVADENE  Apply topically once daily.     triamcinolone acetonide 0.1% 0.1 % cream  Commonly known as: KENALOG  APPLY TOPICALLY TWICE DAILY              "

## 2020-09-25 ENCOUNTER — PATIENT MESSAGE (OUTPATIENT)
Dept: INTERNAL MEDICINE | Facility: CLINIC | Age: 75
End: 2020-09-25

## 2020-09-25 ENCOUNTER — EXTERNAL HOME HEALTH (OUTPATIENT)
Dept: HOME HEALTH SERVICES | Facility: HOSPITAL | Age: 75
End: 2020-09-25
Payer: MEDICARE

## 2020-09-25 LAB
ESTIMATED AVG GLUCOSE: 223 MG/DL (ref 68–131)
HBA1C MFR BLD HPLC: 9.4 % (ref 4–5.6)

## 2020-09-29 ENCOUNTER — PATIENT MESSAGE (OUTPATIENT)
Dept: OTHER | Facility: OTHER | Age: 75
End: 2020-09-29

## 2020-10-12 ENCOUNTER — TELEPHONE (OUTPATIENT)
Dept: ORTHOPEDICS | Facility: CLINIC | Age: 75
End: 2020-10-12

## 2020-10-15 ENCOUNTER — PATIENT OUTREACH (OUTPATIENT)
Dept: ADMINISTRATIVE | Facility: OTHER | Age: 75
End: 2020-10-15

## 2020-10-15 NOTE — PROGRESS NOTES
Care Everywhere: updated  Immunization: updated(delay in links)   Health Maintenance: updated  Media Review: review for outside eye exam report   Legacy Review:   Order placed:   Upcoming appts:

## 2020-10-16 ENCOUNTER — OFFICE VISIT (OUTPATIENT)
Dept: WOUND CARE | Facility: CLINIC | Age: 75
End: 2020-10-16
Payer: MEDICARE

## 2020-10-16 VITALS
DIASTOLIC BLOOD PRESSURE: 84 MMHG | WEIGHT: 260 LBS | SYSTOLIC BLOOD PRESSURE: 165 MMHG | HEART RATE: 68 BPM | HEIGHT: 67 IN | TEMPERATURE: 97 F | BODY MASS INDEX: 40.81 KG/M2

## 2020-10-16 DIAGNOSIS — R60.0 BILATERAL LEG EDEMA: ICD-10-CM

## 2020-10-16 DIAGNOSIS — I87.2 VENOUS INSUFFICIENCY OF BOTH LOWER EXTREMITIES: Chronic | ICD-10-CM

## 2020-10-16 DIAGNOSIS — I87.2 VENOUS STASIS DERMATITIS OF BOTH LOWER EXTREMITIES: Chronic | ICD-10-CM

## 2020-10-16 DIAGNOSIS — B35.3 TINEA PEDIS OF BOTH FEET: Chronic | ICD-10-CM

## 2020-10-16 DIAGNOSIS — L97.821 VARICOSE VEINS OF LEFT LOWER EXTREMITY WITH ULCER OF OTHER PART OF LOWER LEG LIMITED TO BREAKDOWN OF SKIN: Primary | ICD-10-CM

## 2020-10-16 DIAGNOSIS — I83.028 VARICOSE VEINS OF LEFT LOWER EXTREMITY WITH ULCER OF OTHER PART OF LOWER LEG LIMITED TO BREAKDOWN OF SKIN: Primary | ICD-10-CM

## 2020-10-16 PROBLEM — L97.901 ULCER OF LOWER EXTREMITY, LIMITED TO BREAKDOWN OF SKIN: Status: RESOLVED | Noted: 2018-05-10 | Resolved: 2020-10-16

## 2020-10-16 PROBLEM — L97.501 SKIN ULCER OF TOE, LIMITED TO BREAKDOWN OF SKIN: Status: RESOLVED | Noted: 2020-02-07 | Resolved: 2020-10-16

## 2020-10-16 PROCEDURE — 99499 NO LOS: ICD-10-PCS | Mod: HCNC,S$GLB,, | Performed by: NURSE PRACTITIONER

## 2020-10-16 PROCEDURE — 29580 PR STRAPPING UNNA BOOT: ICD-10-PCS | Mod: HCNC,LT,S$GLB, | Performed by: NURSE PRACTITIONER

## 2020-10-16 PROCEDURE — 99499 UNLISTED E&M SERVICE: CPT | Mod: HCNC,S$GLB,, | Performed by: NURSE PRACTITIONER

## 2020-10-16 PROCEDURE — 99999 PR PBB SHADOW E&M-EST. PATIENT-LVL V: ICD-10-PCS | Mod: PBBFAC,HCNC,, | Performed by: NURSE PRACTITIONER

## 2020-10-16 PROCEDURE — 29580 STRAPPING UNNA BOOT: CPT | Mod: HCNC,LT,S$GLB, | Performed by: NURSE PRACTITIONER

## 2020-10-16 PROCEDURE — 99999 PR PBB SHADOW E&M-EST. PATIENT-LVL V: CPT | Mod: PBBFAC,HCNC,, | Performed by: NURSE PRACTITIONER

## 2020-10-16 NOTE — PROGRESS NOTES
Subjective:       Patient ID: Sherin Ortiz is a 75 y.o. female.    Chief Complaint: Wound Check    Wound Check  Wound Check:   This patient is seen today for reevaluation of recurrent ulcers to both lower legs.  Unna boots are on both legs.  The right leg wounds are healed.  The left leg wound is healing as evidenced by wound contracture and epithelialization. She has home health services through [facility]. Problems that interfere with wound healing include multiple co-morbidities, diabetes, edema, diabetic neuropathy and  morbid obesity. Because of safety issues we are unable to weigh the patient as she is unstable on her feet.  The patient ambulates with great difficulty secondary to her body habitus and uses a motorized wheelchair which we cannot get on the scale to weigh her.  She does not complain of pain.  She denies increased swelling, redness or purulent drainage.   She is afebrile.  Review of Systems    Unchanged from previous visit.  Objective:      Physical Exam  Vitals signs and nursing note reviewed.   Constitutional:       General: She is not in acute distress.     Appearance: She is not diaphoretic.      Comments: Morbidly obese   HENT:      Head: Normocephalic and atraumatic.   Neck:      Musculoskeletal: Normal range of motion and neck supple.   Pulmonary:      Effort: Pulmonary effort is normal. No respiratory distress.   Musculoskeletal: Normal range of motion.         General: No tenderness.        Legs:         Feet:    Skin:     General: Skin is warm and dry.      Coloration: Skin is not pale.      Findings: Rash (dermatitis and tinea bilateral lower extremities) present. No erythema.      Nails: There is no clubbing.     Neurological:      Mental Status: She is alert and oriented to person, place, and time.      Coordination: Abnormal coordination: .   Psychiatric:         Behavior: Behavior normal.         Thought Content: Thought content normal.         Judgment: Judgment normal.          Left shin      Hemoglobin A1C   Date Value Ref Range Status   09/24/2020 9.4 (H) 4.0 - 5.6 % Final     Comment:     ADA Screening Guidelines:  5.7-6.4%  Consistent with prediabetes  >or=6.5%  Consistent with diabetes  High levels of fetal hemoglobin interfere with the HbA1C  assay. Heterozygous hemoglobin variants (HbS, HgC, etc)do  not significantly interfere with this assay.   However, presence of multiple variants may affect accuracy.     03/09/2020 9.1 (H) 4.0 - 5.6 % Final     Comment:     ADA Screening Guidelines:  5.7-6.4%  Consistent with prediabetes  >or=6.5%  Consistent with diabetes  High levels of fetal hemoglobin interfere with the HbA1C  assay. Heterozygous hemoglobin variants (HbS, HgC, etc)do  not significantly interfere with this assay.   However, presence of multiple variants may affect accuracy.     11/06/2019 9.6 (H) 4.0 - 5.6 % Final     Comment:     ADA Screening Guidelines:  5.7-6.4%  Consistent with prediabetes  >or=6.5%  Consistent with diabetes  High levels of fetal hemoglobin interfere with the HbA1C  assay. Heterozygous hemoglobin variants (HbS, HgC, etc)do  not significantly interfere with this assay.   However, presence of multiple variants may affect accuracy.       ..  Lab Results   Component Value Date    ALBUMIN 3.0 (L) 09/24/2020     Sherin was seen in the clinic room and placed in the supine position on the treatment table.  The unna boots were removed with scissors and the legs were cleansed with Easi-clense sponges and dried thoroughly.  A mepilex foam dressing was applied to the left shin wound and a mepilex lite foam dressing was applied to the right shin and left dorsal foot wounds.  Eucerin cream was applied to the lower legs.  The patient's feet were positioned at a 90 degree angle.  A zinc oxide wrap, followed by kerlix roll gauze and coban were applied using a spiral technique avoiding creases or folds.  The wraps were started behind the first metatarsal and ended below  the tibial tubercle of the knee.  There was overlap of each turn half the width of the previous turn.  The compression wraps will be changed every 3-4 days.      Assessment:       1. Varicose veins of left lower extremity with ulcer of other part of lower leg limited to breakdown of skin    2. Ulcer of right lower extremity, limited to breakdown of skin    3. Skin ulcer of toe of left foot, limited to breakdown of skin    4. Venous insufficiency of both lower extremities    5. Bilateral leg edema    6. Venous stasis dermatitis of both lower extremities    7. Tinea pedis of both feet        Plan:           Unna boot left lower leg as detailed above.  Pad right ankle with ABD pad.  Kerlix and coban for compression right lower leg.  Patient was warned not to get the dressings wet and to use cast covers for showering.  Should the dressing become wet, she is to remove it, place a wet-to-dry dressing over the wound, cover with gauze and roll gauze and use ace wraps for compression and to secure bandages.  She should then notify home health as soon as possible to have a new dressing applied.  Return to clinic in one month.  [facility] notified of orders via email. We will ask them to see the patient twice weekly.    Home Health Orders:    Triamcinolone cream to bilateral lower legs.  Ketoconazole cream to both feet.  Cleanse wounds with wound cleanser.  Pad ankles with ABD pads.  Apply mepilex foam to left shin ulcer.  Unna boot (zinc oxide, kerlix and coban) left lower leg.  Kerlix and coban right lower leg.  Change dressings twice weekly.  Skilled nurse visit-2 x  weekly

## 2020-10-27 PROCEDURE — G0179 PR HOME HEALTH MD RECERTIFICATION: ICD-10-PCS | Mod: ,,, | Performed by: INTERNAL MEDICINE

## 2020-10-27 PROCEDURE — G0179 MD RECERTIFICATION HHA PT: HCPCS | Mod: ,,, | Performed by: INTERNAL MEDICINE

## 2020-10-30 ENCOUNTER — OFFICE VISIT (OUTPATIENT)
Dept: URGENT CARE | Facility: CLINIC | Age: 75
End: 2020-10-30
Payer: MEDICARE

## 2020-10-30 VITALS
OXYGEN SATURATION: 99 % | WEIGHT: 293 LBS | HEIGHT: 67 IN | SYSTOLIC BLOOD PRESSURE: 172 MMHG | TEMPERATURE: 97 F | DIASTOLIC BLOOD PRESSURE: 86 MMHG | BODY MASS INDEX: 45.99 KG/M2 | HEART RATE: 70 BPM

## 2020-10-30 DIAGNOSIS — M79.89 PAIN AND SWELLING OF LEFT LOWER LEG: Primary | ICD-10-CM

## 2020-10-30 DIAGNOSIS — L97.921 CHRONIC ULCER OF LEFT LEG, LIMITED TO BREAKDOWN OF SKIN: ICD-10-CM

## 2020-10-30 DIAGNOSIS — M79.662 PAIN AND SWELLING OF LEFT LOWER LEG: Primary | ICD-10-CM

## 2020-10-30 DIAGNOSIS — M21.962 FOOT DEFORMITY, LEFT: ICD-10-CM

## 2020-10-30 PROCEDURE — 73590 XR TIBIA FIBULA 2 VIEW LEFT: ICD-10-PCS | Mod: FY,LT,S$GLB, | Performed by: RADIOLOGY

## 2020-10-30 PROCEDURE — 73590 X-RAY EXAM OF LOWER LEG: CPT | Mod: FY,LT,S$GLB, | Performed by: RADIOLOGY

## 2020-10-30 PROCEDURE — 99214 OFFICE O/P EST MOD 30 MIN: CPT | Mod: S$GLB,,, | Performed by: PHYSICIAN ASSISTANT

## 2020-10-30 PROCEDURE — 73610 XR ANKLE COMPLETE 3 VIEW LEFT: ICD-10-PCS | Mod: FY,LT,S$GLB, | Performed by: RADIOLOGY

## 2020-10-30 PROCEDURE — 73630 X-RAY EXAM OF FOOT: CPT | Mod: FY,LT,S$GLB, | Performed by: RADIOLOGY

## 2020-10-30 PROCEDURE — 73630 XR FOOT COMPLETE 3 VIEW LEFT: ICD-10-PCS | Mod: FY,LT,S$GLB, | Performed by: RADIOLOGY

## 2020-10-30 PROCEDURE — 99214 PR OFFICE/OUTPT VISIT, EST, LEVL IV, 30-39 MIN: ICD-10-PCS | Mod: S$GLB,,, | Performed by: PHYSICIAN ASSISTANT

## 2020-10-30 PROCEDURE — 73610 X-RAY EXAM OF ANKLE: CPT | Mod: FY,LT,S$GLB, | Performed by: RADIOLOGY

## 2020-10-31 NOTE — PATIENT INSTRUCTIONS
PLEASE READ YOUR DISCHARGE INSTRUCTIONS ENTIRELY AS IT CONTAINS IMPORTANT INFORMATION.  - OTC Tylenol/anti-inflammatory as needed for pain unless you can not tolerate it or if you have conditions like bleeding stomach ulcers, kidney or liver disease.  - Radiographs and all diagnostic testing reviewed with patient  - if no improvement or worsening symptoms, recommend follow-up with Podiatry, wound care, and PCP  for further evaluation.  Please call the number below to set up appointment; a referral has been placed.  - If you smoke, please stop smoking.  - discussed weight loss given obesity    -You must understand that you've received an Urgent Care treatment only and that you may be released before all your medical problems are known or treated. You, the patient, will arrange for follow up care as instructed. Please arrange follow up with your primary medical clinic within 2-5 days if your signs and symptoms have not resolved or worsen.   - Follow up with your PCP or specialty clinic as directed.  You can call [phone] or [phone] to schedule an appointment with the appropriate provider.    - If your condition worsens or fails to improve we recommend that you receive another evaluation at the emergency room immediately or contact your primary medical clinic to discuss your concerns in next 2-5 days.  Strict ER versus clinic precautions given.      RED FLAGS/WARNING SYMPTOMS DISCUSSED WITH PATIENT THAT WOULD WARRANT EMERGENT MEDICAL ATTENTION. Patient aware and verbalized understanding.

## 2020-10-31 NOTE — PROGRESS NOTES
"Subjective:       Patient ID: Sherin Ortiz is a 75 y.o. female.    Vitals:  height is 5' 7" (1.702 m) and weight is 136.1 kg (300 lb). Her tympanic temperature is 96.7 °F (35.9 °C). Her blood pressure is 172/86 (abnormal) and her pulse is 70. Her oxygen saturation is 99%.     Chief Complaint: Leg Pain      75-year-old female with a history of morbid obesity (BMI 46.9), diabetes, peripheral neuropathy, chronic leg wound, lymphedema, and venous insufficiency, hyperlipidemia, CKD stage 3, previous kidney stones, previous TIA, former smoker, hyperparathyroidism, and wheelchair dependent who presents urgent care clinic for evaluation.  She was sent here by her wound care nurse. Wound care nurse came to change dressings when saw bruising and color change on her left leg. Nurse reported that it was not there last week.  He is refusing to re-dress her leg until she gets x-rays to rule out fracture.  She usually receives home health wound care treatment for chronic leg wounds, venous insufficiency, and lymphedema twice a week and is followed by nurse practitioner Akosua Alvarenga for this.  Patient denies any falls, trauma, or leg injury.    Patient is here with her daughter who is her main caregiver.  They do not feel like there is any significant change and her chronic leg swelling and pain.  She states that her leg color changes intermittently.  She does feel that is slightly darker than a few days ago but this is not new.    Patient denies any fever, chills, purulent drainage, open wound, new/worsening leg weakness, focal weakness/deficits, chest pain, shortness of breath, abdominal pain, nausea/vomiting/diarrhea, URI symptoms, headache, palpitations, or fatigue.          Constitution: Negative for activity change, appetite change, chills, sweating, fatigue, fever and generalized weakness.   HENT: Negative for ear pain, hearing loss, facial swelling, congestion, postnasal drip, sinus pain, sinus pressure, sore throat, " trouble swallowing and voice change.    Neck: Negative for neck pain, neck stiffness and painful lymph nodes.   Cardiovascular: Positive for leg swelling. Negative for chest pain, palpitations, sob on exertion and passing out.   Eyes: Negative for eye discharge, eye pain, photophobia, vision loss, double vision and blurred vision.   Respiratory: Negative for chest tightness, cough, sputum production, bloody sputum, COPD, shortness of breath, stridor, wheezing and asthma.    Gastrointestinal: Negative for abdominal pain, nausea, vomiting, constipation, diarrhea, bright red blood in stool, rectal bleeding, heartburn and bowel incontinence.   Genitourinary: Negative for dysuria, frequency, urgency, urine decreased, flank pain, bladder incontinence, hematuria and history of kidney stones.   Musculoskeletal: Positive for pain and joint pain. Negative for trauma, joint swelling, abnormal ROM of joint, muscle cramps and muscle ache.   Skin: Positive for color change, wound, skin thickening/induration, erythema and bruising. Negative for pale and rash.   Allergic/Immunologic: Negative for seasonal allergies, asthma and immunocompromised state.   Neurological: Negative for dizziness, history of vertigo, light-headedness, passing out, facial drooping, speech difficulty, coordination disturbances, loss of balance, headaches, disorientation, altered mental status, loss of consciousness, tingling and seizures.   Hematologic/Lymphatic: Negative for swollen lymph nodes, easy bruising/bleeding and trouble clotting. Does not bruise/bleed easily.   Psychiatric/Behavioral: Negative for altered mental status, disorientation, nervous/anxious, sleep disturbance and depression. The patient is not nervous/anxious.        Objective:      Physical Exam   Constitutional: She is oriented to person, place, and time. She appears well-developed. She is cooperative.  Non-toxic appearance. She does not appear ill. No distress. obesity  HENT:    Head: Normocephalic and atraumatic.   Ears:   Right Ear: Hearing, external ear and ear canal normal. No drainage, swelling or tenderness.   Left Ear: Hearing, external ear and ear canal normal. No drainage, swelling or tenderness.   Nose: Nose normal. No rhinorrhea, purulent discharge or congestion. Right sinus exhibits no maxillary sinus tenderness and no frontal sinus tenderness. Left sinus exhibits no maxillary sinus tenderness and no frontal sinus tenderness.   Mouth/Throat: Uvula is midline, oropharynx is clear and moist and mucous membranes are normal. No oral lesions. No trismus in the jaw. No uvula swelling. No oropharyngeal exudate, posterior oropharyngeal edema or posterior oropharyngeal erythema. No tonsillar exudate.   Eyes: Pupils are equal, round, and reactive to light. Conjunctivae, EOM and lids are normal. Right eye exhibits no discharge. Left eye exhibits no discharge. No visual field deficit is present. Right conjunctiva is not injected. Right conjunctiva has no hemorrhage. Left conjunctiva is not injected. Left conjunctiva has no hemorrhage. extraocular movement intactvision grossly intactgaze aligned appropriately  Neck: Normal range of motion and full passive range of motion without pain. Neck supple. No neck rigidity.   Cardiovascular: Normal rate, regular rhythm, normal heart sounds and normal pulses.   No murmur heard.  Pulmonary/Chest: Effort normal and breath sounds normal. No accessory muscle usage or stridor. No respiratory distress. She has no wheezes. She exhibits no tenderness.   Abdominal: Soft. Normal appearance. She exhibits no distension and no mass. There is no splenomegaly or hepatomegaly. There is no abdominal tenderness. There is no rebound, no guarding, no tenderness at McBurney's point, negative Euceda's sign, no left CVA tenderness, negative Rovsing's sign and no right CVA tenderness.   Musculoskeletal: Normal range of motion.         General: Swelling and deformity  present. No tenderness or signs of injury.      Right lower leg: No edema.      Left lower leg: No edema.      Comments: Moves all extremities with normal tone, strength, and ROM.  Gait normal.    Left foot gross generalized arthritic deformity and ankle inversion.  No tenderness to palpation or ankle laxity present.   Lymphadenopathy:     She has no cervical adenopathy.   Neurological: She is alert and oriented to person, place, and time. She has normal motor skills, normal sensation and intact cranial nerves. She displays no weakness, facial symmetry and normal reflexes. No cranial nerve deficit or sensory deficit. She exhibits normal muscle tone. She has a normal Finger-Nose-Finger Test. She shows no pronator drift. She displays no seizure activity. Gait and coordination normal. Coordination normal. GCS eye subscore is 4. GCS verbal subscore is 5. GCS motor subscore is 6.   Skin: Skin is warm, dry, not diaphoretic and no rash. Capillary refill takes less than 2 seconds. Lesions:  erythema        Comments: Chronic bilateral lower extremity vascular changes and venous stasis.  Mild superficial left leg closed chronic wound.  There is generalized 2+ pitting edema lower extremity.    No purulent drainage, Bay's sign, or tenderness to palpation bilateral calf. Psychiatric: Her speech is normal and behavior is normal. Mood and thought content normal.   Nursing note and vitals reviewed.        X-ray Tibia Fibula 2 View Left Result Date: 10/30/2020  EXAMINATION: XR TIBIA FIBULA 2 VIEW LEFT; XR ANKLE COMPLETE 3 VIEW LEFT CLINICAL HISTORY: Pain in left lower leg TECHNIQUE: AP and lateral views of the left tibia and fibula were performed.  Three views of the left ankle were also obtained. COMPARISON: Right foot, 05/10/2019. FINDINGS: There are too numerous to count vascular type calcifications compatible with chronic phlebitis in deep and superficial veins of the calf.  The underlying bones are intact with degenerative  changes in the knee joint specially in the medial compartment.  There is no evidence of fracture or dislocation. The calcaneus has undergone fracture and fragmentation with posterior displacement.  The subtalar joint is deformed as well.  There is pes planus deformity of the midfoot.     Remote fracture and displacement of the calcaneus since previous exam of the foot in 2019. No evidence of tibial or fibular fracture. No acute fracture of the ankle evident. Electronically signed by: Damien Rodriguez Date:    10/30/2020 Time:    21:37    X-ray Ankle Complete 3 View Left Result Date: 10/30/2020  EXAMINATION: XR TIBIA FIBULA 2 VIEW LEFT; XR ANKLE COMPLETE 3 VIEW LEFT CLINICAL HISTORY: Pain in left lower leg TECHNIQUE: AP and lateral views of the left tibia and fibula were performed.  Three views of the left ankle were also obtained. COMPARISON: Right foot, 05/10/2019. FINDINGS: There are too numerous to count vascular type calcifications compatible with chronic phlebitis in deep and superficial veins of the calf.  The underlying bones are intact with degenerative changes in the knee joint specially in the medial compartment.  There is no evidence of fracture or dislocation. The calcaneus has undergone fracture and fragmentation with posterior displacement.  The subtalar joint is deformed as well.  There is pes planus deformity of the midfoot.     Remote fracture and displacement of the calcaneus since previous exam of the foot in 2019. No evidence of tibial or fibular fracture. No acute fracture of the ankle evident. Electronically signed by: Damien Rodriguez Date:    10/30/2020 Time:    21:37    X-ray Foot Complete 3 View Left Result Date: 10/30/2020  EXAMINATION: XR FOOT COMPLETE 3 VIEW LEFT CLINICAL HISTORY: .  Pain in left lower leg TECHNIQUE: AP, lateral and oblique views of the left foot were performed. COMPARISON: None FINDINGS: Multiple views of the left foot indicates that there is diffuse soft tissue swelling  of the entire foot with significant derangement of the normal articular alignment.  There are multiple linear opacifications in the skin suggesting foreign bodies which appear to be either metallic filament are possibly small needles.  There appears to be a total of six foreign bodies No obvious evidence of air within the skin.  No apparent septic joint or osteomyelitis.  No discernible cellulitis.     Circumferential swelling of the foot. Multiple linear opacifications within the soft tissues highly suggestive of foreign bodies. Osteopenia of the osseous structures with destruction and deformity of the normal articulations. Electronically signed by: Oliver Abrams Date:    10/30/2020 Time:    20:47            Assessment:       1. Pain and swelling of left lower leg    2. Chronic ulcer of left leg, limited to breakdown of skin    3. Foot deformity, left        On exam, patient is nontoxic appearing and vitals are stable.  Patient is essentially neurovascularly intact on exam.  Leg pain and swelling or chronic and grossly stable per patient and daughter.  There is no open wound, DVT, or concerns for cellulitis.   Xrays showed no acute fracture or dislocation.  X-ray showed chronic vascular calcifications and chronic calcaneus and subtalar joint deformity. Diagnostic testing results were independently reviewed and interpreted, which were discussed in depth with patient.    Patient was recommended OTC treatments for their symptoms.    Patient was treated conservatively with activity modification, OTC pain reliever, leg elevation, and RICE therapy. Patient was referred to podiatry for evaluation.    If symptoms do not improve/worsens, patient was referred back to PCP and wound care for continued outpatient workup and management. Discussed diet, exercise, and weight loss given their obesity.    Patient was instructed to return for re-evaluation for any worsening or change in current symptoms. Strict ED versus clinic  precautions given in depth. Discharge and follow-up instructions given verbally/printed with the patient who expressed understanding and willingness to comply with my recommendations.  Patient verbalized understanding and agreed with the entirety of plan of care.    Note dictated with voice recognition software, please excuse any grammatical errors.    Plan:         Pain and swelling of left lower leg  -     X-Ray Ankle Complete 3 View Left; Future; Expected date: 10/30/2020  -     X-Ray Foot Complete 3 view Left; Future; Expected date: 10/30/2020  -     X-Ray Tibia Fibula 2 View Left; Future; Expected date: 10/30/2020    Chronic ulcer of left leg, limited to breakdown of skin  -     X-Ray Ankle Complete 3 View Left; Future; Expected date: 10/30/2020  -     X-Ray Foot Complete 3 view Left; Future; Expected date: 10/30/2020  -     X-Ray Tibia Fibula 2 View Left; Future; Expected date: 10/30/2020    Foot deformity, left  -     Ambulatory referral/consult to Podiatry           Patient Instructions   PLEASE READ YOUR DISCHARGE INSTRUCTIONS ENTIRELY AS IT CONTAINS IMPORTANT INFORMATION.  - OTC Tylenol/anti-inflammatory as needed for pain unless you can not tolerate it or if you have conditions like bleeding stomach ulcers, kidney or liver disease.  - Radiographs and all diagnostic testing reviewed with patient  - if no improvement or worsening symptoms, recommend follow-up with Podiatry, wound care, and PCP  for further evaluation.  Please call the number below to set up appointment; a referral has been placed.  - If you smoke, please stop smoking.  - discussed weight loss given obesity    -You must understand that you've received an Urgent Care treatment only and that you may be released before all your medical problems are known or treated. You, the patient, will arrange for follow up care as instructed. Please arrange follow up with your primary medical clinic within 2-5 days if your signs and symptoms have not  resolved or worsen.   - Follow up with your PCP or specialty clinic as directed.  You can call [phone] or [phone] to schedule an appointment with the appropriate provider.    - If your condition worsens or fails to improve we recommend that you receive another evaluation at the emergency room immediately or contact your primary medical clinic to discuss your concerns in next 2-5 days.  Strict ER versus clinic precautions given.      RED FLAGS/WARNING SYMPTOMS DISCUSSED WITH PATIENT THAT WOULD WARRANT EMERGENT MEDICAL ATTENTION. Patient aware and verbalized understanding.

## 2020-11-13 ENCOUNTER — OFFICE VISIT (OUTPATIENT)
Dept: WOUND CARE | Facility: CLINIC | Age: 75
End: 2020-11-13
Payer: MEDICARE

## 2020-11-13 VITALS
DIASTOLIC BLOOD PRESSURE: 79 MMHG | HEART RATE: 69 BPM | SYSTOLIC BLOOD PRESSURE: 147 MMHG | BODY MASS INDEX: 46.99 KG/M2 | HEIGHT: 67 IN | TEMPERATURE: 97 F

## 2020-11-13 DIAGNOSIS — I83.018 VARICOSE VEINS OF RIGHT LOWER EXTREMITY WITH ULCER OTHER PART OF LOWER LEG: ICD-10-CM

## 2020-11-13 DIAGNOSIS — L97.821 VARICOSE VEINS OF LEFT LOWER EXTREMITY WITH ULCER OF OTHER PART OF LOWER LEG LIMITED TO BREAKDOWN OF SKIN: Primary | ICD-10-CM

## 2020-11-13 DIAGNOSIS — I83.028 VARICOSE VEINS OF LEFT LOWER EXTREMITY WITH ULCER OF OTHER PART OF LOWER LEG LIMITED TO BREAKDOWN OF SKIN: Primary | ICD-10-CM

## 2020-11-13 DIAGNOSIS — I87.2 VENOUS INSUFFICIENCY OF BOTH LOWER EXTREMITIES: Chronic | ICD-10-CM

## 2020-11-13 DIAGNOSIS — L97.819 VARICOSE VEINS OF RIGHT LOWER EXTREMITY WITH ULCER OTHER PART OF LOWER LEG: ICD-10-CM

## 2020-11-13 DIAGNOSIS — I89.0 LYMPHEDEMA: ICD-10-CM

## 2020-11-13 PROCEDURE — 1126F PR PAIN SEVERITY QUANTIFIED, NO PAIN PRESENT: ICD-10-PCS | Mod: HCNC,S$GLB,, | Performed by: NURSE PRACTITIONER

## 2020-11-13 PROCEDURE — 1101F PT FALLS ASSESS-DOCD LE1/YR: CPT | Mod: HCNC,CPTII,S$GLB, | Performed by: NURSE PRACTITIONER

## 2020-11-13 PROCEDURE — 1101F PR PT FALLS ASSESS DOC 0-1 FALLS W/OUT INJ PAST YR: ICD-10-PCS | Mod: HCNC,CPTII,S$GLB, | Performed by: NURSE PRACTITIONER

## 2020-11-13 PROCEDURE — 1126F AMNT PAIN NOTED NONE PRSNT: CPT | Mod: HCNC,S$GLB,, | Performed by: NURSE PRACTITIONER

## 2020-11-13 PROCEDURE — 3288F PR FALLS RISK ASSESSMENT DOCUMENTED: ICD-10-PCS | Mod: HCNC,CPTII,S$GLB, | Performed by: NURSE PRACTITIONER

## 2020-11-13 PROCEDURE — 99499 NO LOS: ICD-10-PCS | Mod: HCNC,S$GLB,, | Performed by: NURSE PRACTITIONER

## 2020-11-13 PROCEDURE — 3288F FALL RISK ASSESSMENT DOCD: CPT | Mod: HCNC,CPTII,S$GLB, | Performed by: NURSE PRACTITIONER

## 2020-11-13 PROCEDURE — 29580 STRAPPING UNNA BOOT: CPT | Mod: 50,HCNC,S$GLB, | Performed by: NURSE PRACTITIONER

## 2020-11-13 PROCEDURE — 99499 UNLISTED E&M SERVICE: CPT | Mod: HCNC,S$GLB,, | Performed by: NURSE PRACTITIONER

## 2020-11-13 PROCEDURE — 29580 PR STRAPPING UNNA BOOT: ICD-10-PCS | Mod: 50,HCNC,S$GLB, | Performed by: NURSE PRACTITIONER

## 2020-11-13 PROCEDURE — 99999 PR PBB SHADOW E&M-EST. PATIENT-LVL V: ICD-10-PCS | Mod: PBBFAC,HCNC,, | Performed by: NURSE PRACTITIONER

## 2020-11-13 PROCEDURE — 99999 PR PBB SHADOW E&M-EST. PATIENT-LVL V: CPT | Mod: PBBFAC,HCNC,, | Performed by: NURSE PRACTITIONER

## 2020-11-13 NOTE — PROGRESS NOTES
Subjective:       Patient ID: Sherin Ortiz is a 75 y.o. female.    Chief Complaint: Wound Check    Wound Check  Wound Check:   This patient is seen today for reevaluation of recurrent ulcers to both lower legs.  Unna boots are on both legs.  The right leg wounds are healed.  The left leg wound is healing as evidenced by wound contracture and epithelialization. However she has new wounds to the left medial calf and right anterior lower leg. She has home health services through [facility]. Problems that interfere with wound healing include multiple co-morbidities, diabetes, edema, diabetic neuropathy and  morbid obesity. Because of safety issues we are unable to weigh the patient as she is unstable on her feet.  The patient ambulates with great difficulty secondary to her body habitus and uses a motorized wheelchair which we cannot get on the scale to weigh her.  She does not complain of pain.  She denies increased swelling, redness or purulent drainage.   She is afebrile.  Review of Systems    Unchanged from previous visit.  Objective:      Physical Exam  Vitals signs and nursing note reviewed.   Constitutional:       General: She is not in acute distress.     Appearance: She is not diaphoretic.      Comments: Morbidly obese   HENT:      Head: Normocephalic and atraumatic.   Neck:      Musculoskeletal: Normal range of motion and neck supple.   Pulmonary:      Effort: Pulmonary effort is normal. No respiratory distress.   Musculoskeletal: Normal range of motion.         General: No tenderness.        Legs:    Skin:     General: Skin is warm and dry.      Coloration: Skin is not pale.      Findings: Rash (dermatitis and tinea bilateral lower extremities) present. No erythema.      Nails: There is no clubbing.     Neurological:      Mental Status: She is alert and oriented to person, place, and time.      Coordination: Abnormal coordination: .   Psychiatric:         Behavior: Behavior normal.         Thought  Content: Thought content normal.         Judgment: Judgment normal.         Left shin    Left medial leg    Right anterior leg      Hemoglobin A1C   Date Value Ref Range Status   09/24/2020 9.4 (H) 4.0 - 5.6 % Final     Comment:     ADA Screening Guidelines:  5.7-6.4%  Consistent with prediabetes  >or=6.5%  Consistent with diabetes  High levels of fetal hemoglobin interfere with the HbA1C  assay. Heterozygous hemoglobin variants (HbS, HgC, etc)do  not significantly interfere with this assay.   However, presence of multiple variants may affect accuracy.     03/09/2020 9.1 (H) 4.0 - 5.6 % Final     Comment:     ADA Screening Guidelines:  5.7-6.4%  Consistent with prediabetes  >or=6.5%  Consistent with diabetes  High levels of fetal hemoglobin interfere with the HbA1C  assay. Heterozygous hemoglobin variants (HbS, HgC, etc)do  not significantly interfere with this assay.   However, presence of multiple variants may affect accuracy.     11/06/2019 9.6 (H) 4.0 - 5.6 % Final     Comment:     ADA Screening Guidelines:  5.7-6.4%  Consistent with prediabetes  >or=6.5%  Consistent with diabetes  High levels of fetal hemoglobin interfere with the HbA1C  assay. Heterozygous hemoglobin variants (HbS, HgC, etc)do  not significantly interfere with this assay.   However, presence of multiple variants may affect accuracy.       ..  Lab Results   Component Value Date    ALBUMIN 3.0 (L) 09/24/2020     Sherin was seen in the clinic room and placed in the supine position on the treatment table.  The unna boots were removed with scissors and the legs were cleansed with Easi-clense sponges and dried thoroughly. A hydrofiber dressing was applied to bilateral leg wounds.  The right leg wounds were covered with ABD pad.  Both ankles were padded with ABD pads. Eucerin cream was applied to the lower legs.  The patient's feet were positioned at a 90 degree angle.  A zinc oxide wrap, followed by kerlix roll gauze and coban were applied using a  spiral technique avoiding creases or folds.  The wraps were started behind the first metatarsal and ended below the tibial tubercle of the knee.  There was overlap of each turn half the width of the previous turn.  The compression wraps will be changed every 3-4 days.      Assessment:       1. Varicose veins of left lower extremity with ulcer of other part of lower leg limited to breakdown of skin    2. Varicose veins of right lower extremity with ulcer other part of lower leg    3. Venous insufficiency of both lower extremities        Plan:           Unna boot bilateral lower legs as detailed above.  Patient was warned not to get the dressings wet and to use cast covers for showering.  Should the dressing become wet, she is to remove it, place a wet-to-dry dressing over the wound, cover with gauze and roll gauze and use ace wraps for compression and to secure bandages.  She should then notify home health as soon as possible to have a new dressing applied.  Return to clinic in one month.  [facility] notified of orders via email. We will ask them to see the patient twice weekly.    Home Health Orders:    Triamcinolone cream to bilateral lower legs.  Ketoconazole cream to both feet.  Cleanse wounds with wound cleanser.  Pad ankles with ABD pads.  Apply hydrofiber to bilateral leg ulcers.  Cover right leg ulcers with ABD pad.  Unna boot (zinc oxide, kerlix and coban) bilateral lower legs.  Place cotton in between the toes, cover with cotton gauze and secure with roll gauze.  Change dressings twice weekly.  Skilled nurse visit-2 x  weekly                                                                                                                                                                                                                                                                                                                                                                                                                                                                                                                                                                                                     Dr Haynes

## 2020-11-16 ENCOUNTER — EXTERNAL HOME HEALTH (OUTPATIENT)
Dept: HOME HEALTH SERVICES | Facility: HOSPITAL | Age: 75
End: 2020-11-16
Payer: MEDICARE

## 2020-11-21 ENCOUNTER — HOSPITAL ENCOUNTER (OUTPATIENT)
Facility: HOSPITAL | Age: 75
Discharge: HOME | End: 2020-11-24
Attending: EMERGENCY MEDICINE | Admitting: EMERGENCY MEDICINE
Payer: MEDICARE

## 2020-11-21 DIAGNOSIS — I50.32 CHRONIC DIASTOLIC HEART FAILURE: ICD-10-CM

## 2020-11-21 DIAGNOSIS — N39.0 URINARY TRACT INFECTION WITHOUT HEMATURIA: ICD-10-CM

## 2020-11-21 DIAGNOSIS — R06.02 SHORTNESS OF BREATH: ICD-10-CM

## 2020-11-21 DIAGNOSIS — I73.9 PAD (PERIPHERAL ARTERY DISEASE): ICD-10-CM

## 2020-11-21 DIAGNOSIS — R53.1 WEAKNESS: ICD-10-CM

## 2020-11-21 DIAGNOSIS — N39.0 URINARY TRACT INFECTION WITHOUT HEMATURIA, SITE UNSPECIFIED: Primary | ICD-10-CM

## 2020-11-21 LAB
ALBUMIN SERPL BCP-MCNC: 2.7 G/DL (ref 3.5–5.2)
ALP SERPL-CCNC: 154 U/L (ref 55–135)
ALT SERPL W/O P-5'-P-CCNC: 11 U/L (ref 10–44)
ANION GAP SERPL CALC-SCNC: 10 MMOL/L (ref 8–16)
AST SERPL-CCNC: 13 U/L (ref 10–40)
B-OH-BUTYR BLD STRIP-SCNC: 0.3 MMOL/L (ref 0–0.5)
BACTERIA #/AREA URNS AUTO: ABNORMAL /HPF
BASOPHILS # BLD AUTO: 0.02 K/UL (ref 0–0.2)
BASOPHILS NFR BLD: 0.2 % (ref 0–1.9)
BILIRUB SERPL-MCNC: 0.6 MG/DL (ref 0.1–1)
BILIRUB UR QL STRIP: NEGATIVE
BNP SERPL-MCNC: 389 PG/ML (ref 0–99)
BUN SERPL-MCNC: 30 MG/DL (ref 8–23)
CALCIUM SERPL-MCNC: 8.6 MG/DL (ref 8.7–10.5)
CHLORIDE SERPL-SCNC: 102 MMOL/L (ref 95–110)
CLARITY UR REFRACT.AUTO: ABNORMAL
CO2 SERPL-SCNC: 22 MMOL/L (ref 23–29)
COLOR UR AUTO: ABNORMAL
CREAT SERPL-MCNC: 1.8 MG/DL (ref 0.5–1.4)
CTP QC/QA: YES
CTP QC/QA: YES
DIFFERENTIAL METHOD: ABNORMAL
EOSINOPHIL # BLD AUTO: 0 K/UL (ref 0–0.5)
EOSINOPHIL NFR BLD: 0 % (ref 0–8)
ERYTHROCYTE [DISTWIDTH] IN BLOOD BY AUTOMATED COUNT: 13.1 % (ref 11.5–14.5)
EST. GFR  (AFRICAN AMERICAN): 30.9 ML/MIN/1.73 M^2
EST. GFR  (NON AFRICAN AMERICAN): 26.8 ML/MIN/1.73 M^2
GLUCOSE SERPL-MCNC: 272 MG/DL (ref 70–110)
GLUCOSE UR QL STRIP: ABNORMAL
HCT VFR BLD AUTO: 32.7 % (ref 37–48.5)
HGB BLD-MCNC: 10.1 G/DL (ref 12–16)
HGB UR QL STRIP: ABNORMAL
HYALINE CASTS UR QL AUTO: 0 /LPF
IMM GRANULOCYTES # BLD AUTO: 0.02 K/UL (ref 0–0.04)
IMM GRANULOCYTES NFR BLD AUTO: 0.2 % (ref 0–0.5)
KETONES UR QL STRIP: NEGATIVE
LACTATE SERPL-SCNC: 1.3 MMOL/L (ref 0.5–2.2)
LEUKOCYTE ESTERASE UR QL STRIP: ABNORMAL
LIPASE SERPL-CCNC: 86 U/L (ref 4–60)
LYMPHOCYTES # BLD AUTO: 0.6 K/UL (ref 1–4.8)
LYMPHOCYTES NFR BLD: 5.7 % (ref 18–48)
MAGNESIUM SERPL-MCNC: 1.7 MG/DL (ref 1.6–2.6)
MCH RBC QN AUTO: 28.4 PG (ref 27–31)
MCHC RBC AUTO-ENTMCNC: 30.9 G/DL (ref 32–36)
MCV RBC AUTO: 92 FL (ref 82–98)
MICROSCOPIC COMMENT: ABNORMAL
MONOCYTES # BLD AUTO: 0.6 K/UL (ref 0.3–1)
MONOCYTES NFR BLD: 5.7 % (ref 4–15)
NEUTROPHILS # BLD AUTO: 8.5 K/UL (ref 1.8–7.7)
NEUTROPHILS NFR BLD: 88.2 % (ref 38–73)
NITRITE UR QL STRIP: POSITIVE
NRBC BLD-RTO: 0 /100 WBC
PH UR STRIP: 5 [PH] (ref 5–8)
PHOSPHATE SERPL-MCNC: 3.5 MG/DL (ref 2.7–4.5)
PLATELET # BLD AUTO: 241 K/UL (ref 150–350)
PMV BLD AUTO: 9.1 FL (ref 9.2–12.9)
POC MOLECULAR INFLUENZA A AGN: NEGATIVE
POC MOLECULAR INFLUENZA B AGN: NEGATIVE
POCT GLUCOSE: 249 MG/DL (ref 70–110)
POCT GLUCOSE: 251 MG/DL (ref 70–110)
POCT GLUCOSE: 260 MG/DL (ref 70–110)
POTASSIUM SERPL-SCNC: 4.8 MMOL/L (ref 3.5–5.1)
PROT SERPL-MCNC: 6.9 G/DL (ref 6–8.4)
PROT UR QL STRIP: ABNORMAL
RBC # BLD AUTO: 3.56 M/UL (ref 4–5.4)
RBC #/AREA URNS AUTO: 48 /HPF (ref 0–4)
SARS-COV-2 RDRP RESP QL NAA+PROBE: NEGATIVE
SODIUM SERPL-SCNC: 134 MMOL/L (ref 136–145)
SP GR UR STRIP: 1.01 (ref 1–1.03)
SQUAMOUS #/AREA URNS AUTO: 3 /HPF
TROPONIN I SERPL DL<=0.01 NG/ML-MCNC: 0.05 NG/ML (ref 0–0.03)
URN SPEC COLLECT METH UR: ABNORMAL
WBC # BLD AUTO: 9.66 K/UL (ref 3.9–12.7)
WBC #/AREA URNS AUTO: >100 /HPF (ref 0–5)
YEAST UR QL AUTO: ABNORMAL

## 2020-11-21 PROCEDURE — 99285 PR EMERGENCY DEPT VISIT,LEVEL V: ICD-10-PCS | Mod: HCNC,CS,, | Performed by: EMERGENCY MEDICINE

## 2020-11-21 PROCEDURE — G0378 HOSPITAL OBSERVATION PER HR: HCPCS

## 2020-11-21 PROCEDURE — 96374 THER/PROPH/DIAG INJ IV PUSH: CPT

## 2020-11-21 PROCEDURE — 87086 URINE CULTURE/COLONY COUNT: CPT

## 2020-11-21 PROCEDURE — U0002 COVID-19 LAB TEST NON-CDC: HCPCS | Performed by: EMERGENCY MEDICINE

## 2020-11-21 PROCEDURE — 63600175 PHARM REV CODE 636 W HCPCS: Performed by: EMERGENCY MEDICINE

## 2020-11-21 PROCEDURE — 99285 EMERGENCY DEPT VISIT HI MDM: CPT | Mod: HCNC,CS,, | Performed by: EMERGENCY MEDICINE

## 2020-11-21 PROCEDURE — 81001 URINALYSIS AUTO W/SCOPE: CPT

## 2020-11-21 PROCEDURE — 93010 ELECTROCARDIOGRAM REPORT: CPT | Mod: ,,, | Performed by: INTERNAL MEDICINE

## 2020-11-21 PROCEDURE — 82962 GLUCOSE BLOOD TEST: CPT

## 2020-11-21 PROCEDURE — 25000003 PHARM REV CODE 250: Performed by: EMERGENCY MEDICINE

## 2020-11-21 PROCEDURE — C9399 UNCLASSIFIED DRUGS OR BIOLOG: HCPCS | Performed by: HOSPITALIST

## 2020-11-21 PROCEDURE — 96372 THER/PROPH/DIAG INJ SC/IM: CPT | Mod: 59

## 2020-11-21 PROCEDURE — 84100 ASSAY OF PHOSPHORUS: CPT

## 2020-11-21 PROCEDURE — 93005 ELECTROCARDIOGRAM TRACING: CPT

## 2020-11-21 PROCEDURE — 63600175 PHARM REV CODE 636 W HCPCS: Performed by: HOSPITALIST

## 2020-11-21 PROCEDURE — 99220 PR INITIAL OBSERVATION CARE,LEVL III: ICD-10-PCS | Mod: ,,, | Performed by: HOSPITALIST

## 2020-11-21 PROCEDURE — 99285 EMERGENCY DEPT VISIT HI MDM: CPT | Mod: 25

## 2020-11-21 PROCEDURE — 93010 EKG 12-LEAD: ICD-10-PCS | Mod: ,,, | Performed by: INTERNAL MEDICINE

## 2020-11-21 PROCEDURE — 83880 ASSAY OF NATRIURETIC PEPTIDE: CPT

## 2020-11-21 PROCEDURE — 87088 URINE BACTERIA CULTURE: CPT

## 2020-11-21 PROCEDURE — 87186 SC STD MICRODIL/AGAR DIL: CPT

## 2020-11-21 PROCEDURE — 84484 ASSAY OF TROPONIN QUANT: CPT

## 2020-11-21 PROCEDURE — 99220 PR INITIAL OBSERVATION CARE,LEVL III: CPT | Mod: ,,, | Performed by: HOSPITALIST

## 2020-11-21 PROCEDURE — 85025 COMPLETE CBC W/AUTO DIFF WBC: CPT

## 2020-11-21 PROCEDURE — 82010 KETONE BODYS QUAN: CPT

## 2020-11-21 PROCEDURE — 96361 HYDRATE IV INFUSION ADD-ON: CPT

## 2020-11-21 PROCEDURE — 25000003 PHARM REV CODE 250: Performed by: HOSPITALIST

## 2020-11-21 PROCEDURE — 83735 ASSAY OF MAGNESIUM: CPT

## 2020-11-21 PROCEDURE — 87077 CULTURE AEROBIC IDENTIFY: CPT

## 2020-11-21 PROCEDURE — 83605 ASSAY OF LACTIC ACID: CPT

## 2020-11-21 PROCEDURE — 83690 ASSAY OF LIPASE: CPT

## 2020-11-21 PROCEDURE — 80053 COMPREHEN METABOLIC PANEL: CPT

## 2020-11-21 PROCEDURE — 87502 INFLUENZA DNA AMP PROBE: CPT

## 2020-11-21 PROCEDURE — 96375 TX/PRO/DX INJ NEW DRUG ADDON: CPT

## 2020-11-21 RX ORDER — CEFTRIAXONE 1 G/1
1 INJECTION, POWDER, FOR SOLUTION INTRAMUSCULAR; INTRAVENOUS
Status: COMPLETED | OUTPATIENT
Start: 2020-11-21 | End: 2020-11-21

## 2020-11-21 RX ORDER — CLOPIDOGREL BISULFATE 75 MG/1
75 TABLET ORAL DAILY
Status: DISCONTINUED | OUTPATIENT
Start: 2020-11-22 | End: 2020-11-24 | Stop reason: HOSPADM

## 2020-11-21 RX ORDER — IBUPROFEN 200 MG
24 TABLET ORAL
Status: DISCONTINUED | OUTPATIENT
Start: 2020-11-21 | End: 2020-11-24 | Stop reason: HOSPADM

## 2020-11-21 RX ORDER — INSULIN ASPART 100 [IU]/ML
1-10 INJECTION, SOLUTION INTRAVENOUS; SUBCUTANEOUS
Status: DISCONTINUED | OUTPATIENT
Start: 2020-11-21 | End: 2020-11-24 | Stop reason: HOSPADM

## 2020-11-21 RX ORDER — CEFTRIAXONE 1 G/1
1 INJECTION, POWDER, FOR SOLUTION INTRAMUSCULAR; INTRAVENOUS
Status: DISCONTINUED | OUTPATIENT
Start: 2020-11-22 | End: 2020-11-23

## 2020-11-21 RX ORDER — HYDROCODONE BITARTRATE AND ACETAMINOPHEN 5; 325 MG/1; MG/1
1 TABLET ORAL EVERY 4 HOURS PRN
Status: DISCONTINUED | OUTPATIENT
Start: 2020-11-21 | End: 2020-11-24 | Stop reason: HOSPADM

## 2020-11-21 RX ORDER — HEPARIN SODIUM 5000 [USP'U]/ML
7500 INJECTION, SOLUTION INTRAVENOUS; SUBCUTANEOUS EVERY 8 HOURS
Status: DISCONTINUED | OUTPATIENT
Start: 2020-11-21 | End: 2020-11-24 | Stop reason: HOSPADM

## 2020-11-21 RX ORDER — DOXYLAMINE SUCCINATE 25 MG
TABLET ORAL 2 TIMES DAILY
Status: DISCONTINUED | OUTPATIENT
Start: 2020-11-21 | End: 2020-11-24 | Stop reason: HOSPADM

## 2020-11-21 RX ORDER — SODIUM CHLORIDE 0.9 % (FLUSH) 0.9 %
10 SYRINGE (ML) INJECTION
Status: DISCONTINUED | OUTPATIENT
Start: 2020-11-21 | End: 2020-11-24 | Stop reason: HOSPADM

## 2020-11-21 RX ORDER — ONDANSETRON 2 MG/ML
4 INJECTION INTRAMUSCULAR; INTRAVENOUS EVERY 8 HOURS PRN
Status: DISCONTINUED | OUTPATIENT
Start: 2020-11-21 | End: 2020-11-24 | Stop reason: HOSPADM

## 2020-11-21 RX ORDER — GLUCAGON 1 MG
1 KIT INJECTION
Status: DISCONTINUED | OUTPATIENT
Start: 2020-11-21 | End: 2020-11-24 | Stop reason: HOSPADM

## 2020-11-21 RX ORDER — TALC
6 POWDER (GRAM) TOPICAL NIGHTLY PRN
Status: DISCONTINUED | OUTPATIENT
Start: 2020-11-21 | End: 2020-11-24 | Stop reason: HOSPADM

## 2020-11-21 RX ORDER — IBUPROFEN 200 MG
16 TABLET ORAL
Status: DISCONTINUED | OUTPATIENT
Start: 2020-11-21 | End: 2020-11-24 | Stop reason: HOSPADM

## 2020-11-21 RX ORDER — NAPROXEN SODIUM 220 MG/1
81 TABLET, FILM COATED ORAL DAILY
Status: DISCONTINUED | OUTPATIENT
Start: 2020-11-22 | End: 2020-11-24 | Stop reason: HOSPADM

## 2020-11-21 RX ORDER — ATORVASTATIN CALCIUM 20 MG/1
40 TABLET, FILM COATED ORAL NIGHTLY
Status: DISCONTINUED | OUTPATIENT
Start: 2020-11-21 | End: 2020-11-24 | Stop reason: HOSPADM

## 2020-11-21 RX ORDER — ACETAMINOPHEN 325 MG/1
650 TABLET ORAL EVERY 4 HOURS PRN
Status: DISCONTINUED | OUTPATIENT
Start: 2020-11-21 | End: 2020-11-24 | Stop reason: HOSPADM

## 2020-11-21 RX ADMIN — CEFTRIAXONE SODIUM 1 G: 1 INJECTION, POWDER, FOR SOLUTION INTRAMUSCULAR; INTRAVENOUS at 04:11

## 2020-11-21 RX ADMIN — INSULIN DETEMIR 20 UNITS: 100 INJECTION, SOLUTION SUBCUTANEOUS at 09:11

## 2020-11-21 RX ADMIN — SODIUM CHLORIDE 1000 ML: 0.9 INJECTION, SOLUTION INTRAVENOUS at 03:11

## 2020-11-21 RX ADMIN — HEPARIN SODIUM 7500 UNITS: 5000 INJECTION INTRAVENOUS; SUBCUTANEOUS at 09:11

## 2020-11-21 RX ADMIN — INSULIN HUMAN 2 UNITS: 100 INJECTION, SOLUTION PARENTERAL at 02:11

## 2020-11-21 NOTE — ED TRIAGE NOTES
"Pt reports to ED today w/ complaints of emesis, weakness. Pt reports DM type 2. Pt reports frequent blood sugars 300 at night. Pt reports drinking " a lot of water and threw up". Pt reports SOB on exertion   "

## 2020-11-21 NOTE — NURSING
Patient arrived to unit. Oriented to room. Call bell placed in reach. NAD. Purewick in place. Denies pain or discomfort.

## 2020-11-21 NOTE — H&P
HISTORY & PHYSICAL  Hospital Medicine    Team: [facility] [place] HOSP MED O    PRESENTING HISTORY     Chief Complaint/Reason for Admission:  Evaluation of acute cystitis with hematuria    History of Present Illness:  Patient of concern is a morbidly obese 75F with T2DM (complicated by polyneuropathy, nephropathy CKDIII-IV borderline, and retinopathy) on long term insulin, essential HTN, mixed HLD, documented history of TIA (on ASA/plavix), and chronic diastolic heart failure here for evaluation of acute cystitis with hematuria. Patient has had approximately 2 days of weakness and nausea with vomiting. Patient reports that she felt fatigued and globally weak last night, vomited once last night and 1 time this morning, and very thirsty with polyuria this morning.  Also reports associated dry cough along with dyspnea on exertion, which is chronic.  Denies fever/chills, chest pain, abdominal pain, diarrhea.     TTE 11/2014  CONCLUSIONS   Technically difficult study     1 - Mild left ventricular enlargement.     2 - Normal left ventricular systolic function (EF 60-65%).     3 - Right ventricular enlargement with normal systolic function.     4 - Grade I left ventricular diastolic dysfunction.     5 - Biatrial enlargement.     6 - Elevated central venous pressure.     Review of Systems:    Review of Systems   Constitutional: Positive for malaise/fatigue. Negative for chills and fever.   HENT: Negative for congestion and sore throat.    Eyes: Negative for photophobia, pain and discharge.   Respiratory: Positive for cough and shortness of breath. Negative for hemoptysis and sputum production.    Cardiovascular: Positive for leg swelling. Negative for chest pain and palpitations.   Gastrointestinal: Positive for nausea and vomiting. Negative for abdominal pain and diarrhea.   Genitourinary: Positive for frequency. Negative for dysuria and urgency.   Musculoskeletal: Negative for myalgias and neck pain.   Skin: Negative for itching and  rash.   Neurological: Positive for weakness. Negative for sensory change, focal weakness and headaches.   Endo/Heme/Allergies: Negative for polydipsia. Does not bruise/bleed easily.   Psychiatric/Behavioral: Negative for depression and suicidal ideas.       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    Benign essential hypertension 11/14/2012    Cataract     s/p phacoemulsification    Chronic kidney disease (CKD), stage III (moderate) 9/12/2013    Diabetic peripheral neuropathy associated with type 2 diabetes mellitus 11/14/2014    causing right hemiparesis    Gait disorder     Hyperlipidemia     Iritis - Both Eyes 6/10/2013    Kidney stone     Lymphedema     Morbid obesity with BMI of 40.0-44.9, adult 2/18/2015    Nephrolithiasis 4/20/2016    NS (nuclear sclerosis) 4/1/2013    Nuclear sclerosis - Both Eyes 4/29/2013    Preseptal cellulitis - Right Eye 4/29/2013    Proliferative diabetic retinopathy - Both Eyes 4/29/2013    Proliferative diabetic retinopathy, both eyes 4/1/2013    PSC (posterior subcapsular cataract) - Both Eyes 4/29/2013    S/P hernia repair 12/19/2012    TIA (transient ischemic attack) 11/18/2014    Tinea pedis 7/24/2012    Tinea pedis is present on both feet.     Type 2 diabetes mellitus with renal manifestations, controlled 12/12/2013    Type 2 diabetes, controlled, with moderate nonproliferative diabetic retinopathy without macular edema 9/17/2015    Ulcer of left lower extremity, limited to breakdown of skin 7/8/2015    Unspecified cerebral artery occlusion with cerebral infarction 11/16/2014    Unspecified venous (peripheral) insufficiency     Ureteral stone with hydronephrosis 1/27/2016    UTI (lower urinary tract infection)     Vaginal infection     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       Past Surgical History:   Procedure Laterality Date    APPENDECTOMY      CATARACT EXTRACTION W/   INTRAOCULAR LENS IMPLANT Left 2013    CATARACT EXTRACTION W/  INTRAOCULAR LENS IMPLANT Right 2013    CHOLECYSTECTOMY      COLONOSCOPY  2005    normal    ESOPHAGOGASTRODUODENOSCOPY  2015    hiatal hernia, Schatzki ring    EYE SURGERY Bilateral 2008    laser surgery both eyes    NASAL SEPTUM SURGERY      SUBTOTAL COLECTOMY  2012    transverse colon, for incarcerated umbilical hernia, Dr. Kat Bower       Family History   Problem Relation Age of Onset    Diabetes Sister     Cataracts Sister     Heart disease Brother     Cataracts Brother     Leukemia Mother     Cancer Neg Hx     Amblyopia Neg Hx     Blindness Neg Hx     Glaucoma Neg Hx     Hypertension Neg Hx     Macular degeneration Neg Hx     Retinal detachment Neg Hx     Strabismus Neg Hx     Stroke Neg Hx     Thyroid disease Neg Hx     Kidney disease Neg Hx        Social History     Socioeconomic History    Marital status:      Spouse name: Not on file    Number of children: Not on file    Years of education: Not on file    Highest education level: Not on file   Occupational History    Not on file   Social Needs    Financial resource strain: Not on file    Food insecurity     Worry: Not on file     Inability: Not on file    Transportation needs     Medical: Not on file     Non-medical: Not on file   Tobacco Use    Smoking status: Former Smoker     Packs/day: 0.50     Years: 15.00     Pack years: 7.50     Types: Cigarettes     Quit date: 1982     Years since quittin.3    Smokeless tobacco: Former User    Tobacco comment: smoked one pack per week   Substance and Sexual Activity    Alcohol use: No    Drug use: No    Sexual activity: Not Currently   Lifestyle    Physical activity     Days per week: Not on file     Minutes per session: Not on file    Stress: Not on file   Relationships    Social connections     Talks on phone: Not on file     Gets together: Not on file      "Attends Restorationist service: Not on file     Active member of club or organization: Not on file     Attends meetings of clubs or organizations: Not on file     Relationship status: Not on file   Other Topics Concern    Not on file   Social History Narrative    Not on file       MEDICATIONS & ALLERGIES:     No current facility-administered medications on file prior to encounter.      Current Outpatient Medications on File Prior to Encounter   Medication Sig Dispense Refill    aspirin 81 MG Chew Take 81 mg by mouth once daily.        ACCU-CHEK RAMIRO PLUS TEST STRP Strp TEST TWICE DAILY 200 strip 3    ACCU-CHEK SOFTCLIX LANCETS Misc Test twice daily      atorvastatin (LIPITOR) 40 MG tablet TAKE 1 TABLET EVERY EVENING 90 tablet 3    BD INSULIN SYRINGE ULTRA-FINE 0.5 mL 30 gauge x 1/2" Syrg USE TO INJECT EVERY NIGHT 90 each 3    blood-glucose meter Misc Dispense one meter: Insurance Brand Preference Accu-Chek 1 each 0    bumetanide (BUMEX) 1 MG tablet Take 2 tablets (2 mg total) by mouth once daily. 180 tablet 3    clopidogreL (PLAVIX) 75 mg tablet TAKE 1 TABLET EVERY DAY 90 tablet 3    glimepiride (AMARYL) 1 MG tablet Take 2 tablets (2 mg total) by mouth once daily. 180 tablet 3    insulin glargine (LANTUS U-100 INSULIN) 100 unit/mL injection INJECT 30-35 UNITS SUBCUTANEOUSLY IN THE EVENING DEPENDING ON SCALE (DISCARD EACH VIAL AFTER 28 DAYS) 30 mL 3    insulin syr/ndl U100 half yesenia (DROPLET INSULIN SYR HALF UNIT) 0.5 mL 30 gauge x 1/2" Syrg USE TO INJECT EVERY NIGHT 90 Syringe 3    ketoconazole (NIZORAL) 2 % cream LEONID EXT AA QD  1    miconazole nitrate 2% (MICOTIN) 2 % Oint Apply topically 2 (two) times daily.  0    nitrofurantoin (MACRODANTIN) 100 MG capsule Take 1 capsule (100 mg total) by mouth every 12 (twelve) hours. 20 capsule 0    silver sulfADIAZINE 1% (SILVADENE) 1 % cream Apply topically once daily. 1 Tube 0    triamcinolone acetonide 0.1% (KENALOG) 0.1 % cream APPLY TOPICALLY TWICE DAILY 454 " g 0        Review of patient's allergies indicates:   Allergen Reactions    Penicillins Hives     Other reaction(s): Hives  Patient has received cefdinir, ceftriaxone, cefazolin and cefepime in the past with no documented reactions    Sulfa (sulfonamide antibiotics) Other (See Comments)     Eyad, pt states her doctor told her the shakes were possibly caused by an allergy to sulfa       OBJECTIVE:     Vital Signs:  Temp:  [98.6 °F (37 °C)-98.9 °F (37.2 °C)] 98.6 °F (37 °C)  Pulse:  [76-86] 86  Resp:  [18-20] 18  SpO2:  [95 %-98 %] 97 %  BP: (126-200)/(57-81) 127/57  Body mass index is 44.78 kg/m².     Physical Exam:    Objective:  Vital signs: (most recent): Blood pressure 138/63, pulse 78, temperature 98.9 °F (37.2 °C), temperature source Oral, resp. rate 19, weight 136.1 kg (300 lb), SpO2 95 %, not currently breastfeeding.  No fever.      Laboratory  Lab Results   Component Value Date    WBC 9.66 11/21/2020    HGB 10.1 (L) 11/21/2020    HCT 32.7 (L) 11/21/2020    MCV 92 11/21/2020     11/21/2020     Recent Labs   Lab 11/21/20  1336   *   *   K 4.8      CO2 22*   BUN 30*   CREATININE 1.8*   CALCIUM 8.6*   MG 1.7     Lab Results   Component Value Date    INR 1.1 01/18/2020    INR 1.1 03/22/2019    INR 1.0 01/06/2018     Lab Results   Component Value Date    HGBA1C 9.4 (H) 09/24/2020     Recent Labs     11/21/20  1341 11/21/20  1542   POCTGLUCOSE 260* 249*     Diagnostic Results:  Labs: Reviewed  ECG: Reviewed  X-Ray: Reviewed  Echo: Reviewed    ASSESSMENT & PLAN:     Current Problems List:  Active Hospital Problems    Diagnosis  POA    *Urinary tract infection without hematuria [N39.0]  Yes    Hx of transient ischemic attack (TIA) [Z86.73]  Not Applicable    (HFpEF) heart failure with preserved ejection fraction [I50.30]  Yes    Uncontrolled type 2 diabetes mellitus with stage 3 chronic kidney disease, with long-term current use of insulin [E11.22, E11.65, N18.30, Z79.4]  Not  Applicable     Chronic    Dermatitis, stasis [I87.2]  Yes     Chronic    Venous insufficiency of leg [I87.2]  Yes     Chronic    Diabetic peripheral neuropathy associated with type 2 diabetes mellitus [E11.42]  Yes     Chronic    Anemia of chronic disease [D63.8]  Yes    Morbid obesity with BMI of 40.0-44.9, adult [E66.01, Z68.41]  Not Applicable    Weakness [R53.1]  Yes    Intractable vomiting with nausea [R11.2]  Unknown    Stage 3 chronic kidney disease [N18.30]  Yes     Chronic    Essential hypertension [I10]  Yes     Chronic    Hyperlipidemia LDL goal <100 [E78.5]  Yes     Chronic      Resolved Hospital Problems   No resolved problems to display.     Problem Assessment & Treatment Plan:    *Acute cystitis with hematuria  Intractable nausea with vomiting  Weakness  - Patient presents with global weakness and non-focal neurologic exam; this is mostly related to ongoing N/V and increased frequency, UTI is of concern, but it could also be UTI +/- another pathology such as viral gastropathy, food-borne illness, or gastroparesis event (though without chronicity this is less likely)  - C/w CTX daily  - Supportive care  - PT/OT consult    Chronic diastolic heart failure  Essential HTN  - Appears somewhat hypovolemic  - Check 2D ECHO  - IV hydration  - Hold bumex    History of TIA  Mixed HLD  - C/w ASA/plavix as prescribed  - C/w statin    Venous stasis dermatitis b/l  - No clear signs of cellulitis or infection upon takedown of dressings  - Wound care is consulted    Uncontrolled T2DM with neuropathy, nephropathy, and retinopathy  CKDIII  Anemia of chronic renal disease  - Check a1c   - Titrate insulin to effect  - Avoid nephrotoxic agents  - CBC stable    Morbid obesity  Debility  - Nutrition consult for DM2/HF education  - PTOT    DVT PPx: heparin SQ  Diet: cardiac, diabetic  GOC: Previously DNR will clarify with patient    Damien Reed MD  [facility] Medicine

## 2020-11-21 NOTE — ED PROVIDER NOTES
Encounter Date: 11/21/2020       History     Chief Complaint   Patient presents with    Weakness     Pt reports weakness starting last night. Pt reports n/v. One epsiodes of vomitting yesterday night and once this am. Pt reports dry cough. Sob when walking denies chest pain, fevr , chills    Emesis     75-year-old female past medical history of CKD 3, T2 IDDM, stasis dermatitis, anemia of chronic disease, PAD, TIA, CHF pEF, HTN presenting with 2 days of weakness and nausea/vomiting.  Patient reports that she felt fatigued and globally weak last night, vomited once last night and 1 time this morning, and very thirsty with polyuria this morning.  Also reports associated dry cough along with dyspnea on exertion (chronic).  Denies fever/chills, chest pain, abdominal pain, diarrhea.         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    Benign essential hypertension 11/14/2012    Cataract     s/p phacoemulsification    Chronic kidney disease (CKD), stage III (moderate) 9/12/2013    Diabetic peripheral neuropathy associated with type 2 diabetes mellitus 11/14/2014    causing right hemiparesis    Gait disorder     Hyperlipidemia     Iritis - Both Eyes 6/10/2013    Kidney stone     Lymphedema     Morbid obesity with BMI of 40.0-44.9, adult 2/18/2015    Nephrolithiasis 4/20/2016    NS (nuclear sclerosis) 4/1/2013    Nuclear sclerosis - Both Eyes 4/29/2013    Preseptal cellulitis - Right Eye 4/29/2013    Proliferative diabetic retinopathy - Both Eyes 4/29/2013    Proliferative diabetic  retinopathy, both eyes 4/1/2013    PSC (posterior subcapsular cataract) - Both Eyes 4/29/2013    S/P hernia repair 12/19/2012    TIA (transient ischemic attack) 11/18/2014    Tinea pedis 7/24/2012    Tinea pedis is present on both feet.    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 Kat Bower     Family History   Problem Relation Age of Onset    Diabetes Sister     Cataracts Sister     Heart disease Brother     Cataracts Brother     Leukemia Mother     Cancer Neg Hx     Amblyopia Neg Hx     Blindness Neg Hx     Glaucoma Neg Hx     Hypertension Neg Hx     Macular degeneration Neg Hx     Retinal detachment Neg Hx     Strabismus Neg Hx     Stroke Neg Hx     Thyroid disease Neg Hx     Kidney disease Neg Hx      Social History     Tobacco Use    Smoking status: Former Smoker     Packs/day: 0.50     Years: 15.00     Pack years: 7.50     Types: Cigarettes     Quit date:  1982     Years since quittin.4    Smokeless tobacco: Former User    Tobacco comment: smoked one pack per week   Substance Use Topics    Alcohol use: No    Drug use: No     Review of Systems   Constitutional: Positive for fatigue. Negative for chills, diaphoresis and fever.   HENT: Negative for congestion, rhinorrhea, sinus pressure and sore throat.    Eyes: Negative for visual disturbance.        Neg vision changes   Respiratory: Positive for cough. Negative for chest tightness and shortness of breath.         (+) HANDY (chronic)   Cardiovascular: Positive for leg swelling (chronic unchanged). Negative for chest pain and palpitations.   Gastrointestinal: Positive for nausea and vomiting. Negative for abdominal pain, blood in stool and diarrhea.        Neg changes in stool   Endocrine: Positive for polydipsia and polyuria.   Genitourinary: Positive for frequency. Negative for decreased urine volume, dysuria, flank pain and urgency.   Musculoskeletal: Negative for back pain, myalgias and neck stiffness.   Skin: Negative for pallor.   Neurological: Positive for weakness. Negative for dizziness, facial asymmetry, speech difficulty, light-headedness, numbness and headaches.   Hematological: Does not bruise/bleed easily.   Psychiatric/Behavioral: Negative for confusion.       Physical Exam     Initial Vitals   BP Pulse Resp Temp SpO2   20 1320 20 1320 20 1335 20 1320 20 1320   (!) 200/81 84 19 98.9 °F (37.2 °C) 96 %      MAP       --                Physical Exam    Nursing note and vitals reviewed.  Constitutional: She appears well-developed and well-nourished. She is not diaphoretic. No distress.   Appears fatigued   HENT:   Head: Normocephalic and atraumatic.   Nose: Nose normal.   Very dry mucous membranes   Eyes: Conjunctivae and EOM are normal. Pupils are equal, round, and reactive to light. No scleral icterus.   Neck: Normal range of motion. Neck supple.   Cardiovascular:  Normal rate, regular rhythm and intact distal pulses.   Pulmonary/Chest: Breath sounds normal. No respiratory distress. She has no wheezes. She has no rhonchi. She has no rales. She exhibits no tenderness.   Abdominal: Soft. Bowel sounds are normal. She exhibits no distension. There is no abdominal tenderness. There is no rebound.   Musculoskeletal: Normal range of motion. Edema (b/l 2+ (wrapped)) present. No tenderness.   Neurological: She is alert and oriented to person, place, and time. She has normal strength. No cranial nerve deficit.   Skin: Skin is warm and dry. Capillary refill takes less than 2 seconds. No pallor.   Psychiatric: She has a normal mood and affect. Her behavior is normal. Judgment and thought content normal.         ED Course   Procedures  Labs Reviewed   CBC W/ AUTO DIFFERENTIAL - Abnormal; Notable for the following components:       Result Value    RBC 3.56 (*)     Hemoglobin 10.1 (*)     Hematocrit 32.7 (*)     MCHC 30.9 (*)     MPV 9.1 (*)     Gran # (ANC) 8.5 (*)     Lymph # 0.6 (*)     Gran % 88.2 (*)     Lymph % 5.7 (*)     All other components within normal limits   COMPREHENSIVE METABOLIC PANEL - Abnormal; Notable for the following components:    Sodium 134 (*)     CO2 22 (*)     Glucose 272 (*)     BUN 30 (*)     Creatinine 1.8 (*)     Calcium 8.6 (*)     Albumin 2.7 (*)     Alkaline Phosphatase 154 (*)     eGFR if  30.9 (*)     eGFR if non  26.8 (*)     All other components within normal limits   LIPASE - Abnormal; Notable for the following components:    Lipase 86 (*)     All other components within normal limits   URINALYSIS, REFLEX TO URINE CULTURE - Abnormal; Notable for the following components:    Appearance, UA Cloudy (*)     Protein, UA 2+ (*)     Glucose, UA 3+ (*)     Occult Blood UA 3+ (*)     Nitrite, UA Positive (*)     Leukocytes, UA 3+ (*)     All other components within normal limits    Narrative:     Specimen Source->Urine   B-TYPE  NATRIURETIC PEPTIDE - Abnormal; Notable for the following components:     (*)     All other components within normal limits   TROPONIN I - Abnormal; Notable for the following components:    Troponin I 0.046 (*)     All other components within normal limits   URINALYSIS MICROSCOPIC - Abnormal; Notable for the following components:    RBC, UA 48 (*)     WBC, UA >100 (*)     Bacteria Moderate (*)     All other components within normal limits    Narrative:     Specimen Source->Urine   POCT GLUCOSE - Abnormal; Notable for the following components:    POCT Glucose 260 (*)     All other components within normal limits   POCT GLUCOSE - Abnormal; Notable for the following components:    POCT Glucose 249 (*)     All other components within normal limits   CULTURE, URINE   LACTIC ACID, PLASMA   BETA - HYDROXYBUTYRATE, SERUM   MAGNESIUM   PHOSPHORUS   SARS-COV-2 RDRP GENE    Narrative:     This test utilizes isothermal nucleic acid amplification   technology to detect the SARS-CoV-2 RdRp nucleic acid segment.   The analytical sensitivity (limit of detection) is 125 genome   equivalents/mL.   A POSITIVE result implies infection with the SARS-CoV-2 virus;   the patient is presumed to be contagious.     A NEGATIVE result means that SARS-CoV-2 nucleic acids are not   present above the limit of detection. A NEGATIVE result should be   treated as presumptive. It does not rule out the possibility of   COVID-19 and should not be the sole basis for treatment decisions.   If COVID-19 is strongly suspected based on clinical and exposure   history, re-testing using an alternate molecular assay should be   considered.   This test is only for use under the Food and Drug   Administration s Emergency Use Authorization (EUA).   Commercial kits are provided by [employer].   Performance characteristics of the EUA have been independently   verified by [facility] Department of   Pathology and Laboratory Medicine.    _________________________________________________________________   The authorized Fact Sheet for Healthcare Providers and the authorized Fact   Sheet for Patients of the ID NOW COVID-19 are available on the FDA   website:     https://www.fda.gov/media/224831/download  https://www.fda.gov/media/793230/download       POCT INFLUENZA A/B MOLECULAR     EKG Readings: (Independently Interpreted)   LAD, right bundle-branch block, rate 78, morphology grossly similar compared to prior May 2019 when accounting for motion artifact, no STEMI     ECG Results          EKG 12-lead (Final result)  Result time 11/22/20 11:53:05    Final result by Interface, Lab In [facility] (11/22/20 11:53:05)                 Narrative:    Test Reason : R53.1,    Vent. Rate : 078 BPM     Atrial Rate : 394 BPM     P-R Int : 000 ms          QRS Dur : 152 ms      QT Int : 386 ms       P-R-T Axes : 000 -48 043 degrees     QTc Int : 440 ms    Baseline Artifact  Normal sinus rhythm  Low QRS voltage  Left anterior fascicular (zina) block  Right bundle branch block  bifasicular block  Abnormal ECG  When compared with ECG of 13-MAY-2019 08:11,  No significant change was found  Confirmed by Kiki Best MD ([id number]) on 11/22/2020 11:52:54 AM    Referred By: [email or web address]   SELF           Confirmed By:Kiki Best MD                            Imaging Results          X-Ray Chest AP Portable (Final result)  Result time 11/21/20 14:06:16    Final result by Josue Nj MD (11/21/20 14:06:16)                 Impression:      No convincing radiographic evidence of pneumonia or other source of shortness of breath, noting that early/mild viral pneumonia may be radiographically occult.      Electronically signed by: Josue Nj MD  Date:    11/21/2020  Time:    14:06             Narrative:    EXAMINATION:  XR CHEST AP PORTABLE    CLINICAL HISTORY:  Shortness of breath    TECHNIQUE:  Single frontal view of the chest was performed.    COMPARISON:  Chest radiograph  05/14/2019    FINDINGS:  Monitoring leads overlie the chest.  Patient is rotated.  Resolution is limited by body habitus with underpenetration.  Cardiomediastinal silhouette is relatively midline and prominent similar to prior without convincing evidence of failure.  Few scattered linear opacities throughout each lung which likely represents subsegmental scarring versus atelectasis.  The lungs are symmetrically well expanded without large consolidation, pleural effusion or pneumothorax definitively seen.  Pulmonary vasculature and hilar contours are within normal limits.  No acute osseous process seen.  PA and lateral views can be obtained.                                 Medical Decision Making:   Initial Assessment:   Patient is alert oriented x4, vital signs stable, abdomen soft and nontender, however very dry mucous membranes  Differential Diagnosis:   Hyperglycemia, DKA, electrolyte abnormality, metabolic abnormality, UTI, ACS pneumonia, COVID.  No focal neuro deficits concerning for CVA.     Independently Interpreted Test(s):   I have ordered and independently interpreted X-rays - see summary below.       <> Summary of X-Ray Reading(s): No pulm edema  I have ordered and independently interpreted EKG Reading(s) - see prior notes  Clinical Tests:   Lab Tests: Ordered and Reviewed  Radiological Study: Ordered and Reviewed  Medical Tests: Ordered and Reviewed  ED Management:  CBC, CMP, UA, beta hydroxy, EKG, troponin, lactate, lipase, chest x-ray    No leukocytosis, mild anemia at baseline, slight LOREN with creatinine increasing to 1.8 compared to 1.4 one month ago. BNP elevated to 389 however chest x-ray shows no evidence of pulmonary edema.  Troponin mildly elevated at 0.046, likely demand in setting of illness, as patient denies any cardiac symptoms.  Will continue to trend.  Patient given 1 L normal saline due to hyperglycemia 260 but will avoid aggressive hydration, hyperglycemia address with 2 units  insulin.    Urinalysis strongly positive, will treat with antibiotics and admit given weakness.    Prior urine cultures:  8/4/20:  Citrobacter sensitive to Augmentin, Penems, Zosyn, Bactrim.  Patient tolerated ceftriaxone in the past per pharmacy notes, will give ceftriaxone.      MDM Complexity: HIGH Risk                   ED Course as of Nov 22 1509   Sat Nov 21, 2020   1408 Beta - Hydroxybutyrate, Serum [JR]   1409 SARS-CoV-2 RNA, Amplification, Qual: Negative [JR]   1409 POC Molecular Influenza A Ag: Negative [JR]   1409 POC Molecular Influenza B Ag: Negative [JR]   1409 POCT Glucose(!): 260 [JR]   1409 X-Ray Chest AP Portable [JR]   1441 Lipase(!): 86 [JR]   1441 BNP(!): 389 [JR]      ED Course User Index  [JR] Sahra Kulkarni MD            Clinical Impression:       ICD-10-CM ICD-9-CM   1. Urinary tract infection without hematuria, site unspecified  N39.0 599.0   2. Weakness  R53.1 780.79   3. Shortness of breath  R06.02 786.05   4. Chronic diastolic heart failure  I50.32 428.32   5. Urinary tract infection without hematuria  N39.0 599.0                      Disposition:   Disposition: Placed in Observation  Condition: Fair     ED Disposition Condition    Observation                             Sahra Kulkarni MD  11/22/20 151

## 2020-11-22 LAB
ALBUMIN SERPL BCP-MCNC: 2.3 G/DL (ref 3.5–5.2)
ALP SERPL-CCNC: 146 U/L (ref 55–135)
ALT SERPL W/O P-5'-P-CCNC: 11 U/L (ref 10–44)
ANION GAP SERPL CALC-SCNC: 5 MMOL/L (ref 8–16)
ASCENDING AORTA: 3.24 CM
AST SERPL-CCNC: 13 U/L (ref 10–40)
AV INDEX (PROSTH): 0.69
AV MEAN GRADIENT: 6 MMHG
AV PEAK GRADIENT: 9 MMHG
AV VALVE AREA: 2.11 CM2
AV VELOCITY RATIO: 0.57
BASOPHILS # BLD AUTO: 0.01 K/UL (ref 0–0.2)
BASOPHILS NFR BLD: 0.1 % (ref 0–1.9)
BILIRUB SERPL-MCNC: 0.5 MG/DL (ref 0.1–1)
BSA FOR ECHO PROCEDURE: 2.48 M2
BUN SERPL-MCNC: 31 MG/DL (ref 8–23)
CALCIUM SERPL-MCNC: 8.8 MG/DL (ref 8.7–10.5)
CHLORIDE SERPL-SCNC: 107 MMOL/L (ref 95–110)
CO2 SERPL-SCNC: 26 MMOL/L (ref 23–29)
CREAT SERPL-MCNC: 1.8 MG/DL (ref 0.5–1.4)
CV ECHO LV RWT: 0.38 CM
DIFFERENTIAL METHOD: ABNORMAL
DOP CALC AO PEAK VEL: 1.47 M/S
DOP CALC AO VTI: 26.88 CM
DOP CALC LVOT AREA: 3.1 CM2
DOP CALC LVOT DIAMETER: 1.98 CM
DOP CALC LVOT PEAK VEL: 0.84 M/S
DOP CALC LVOT STROKE VOLUME: 56.81 CM3
DOP CALCLVOT PEAK VEL VTI: 18.46 CM
E WAVE DECELERATION TIME: 121.72 MSEC
E/A RATIO: 0.91
E/E' RATIO: 11.75 M/S
ECHO LV POSTERIOR WALL: 1 CM (ref 0.6–1.1)
EOSINOPHIL # BLD AUTO: 0 K/UL (ref 0–0.5)
EOSINOPHIL NFR BLD: 0 % (ref 0–8)
ERYTHROCYTE [DISTWIDTH] IN BLOOD BY AUTOMATED COUNT: 13.2 % (ref 11.5–14.5)
EST. GFR  (AFRICAN AMERICAN): 30.9 ML/MIN/1.73 M^2
EST. GFR  (NON AFRICAN AMERICAN): 26.8 ML/MIN/1.73 M^2
ESTIMATED AVG GLUCOSE: 214 MG/DL (ref 68–131)
FRACTIONAL SHORTENING: 40 % (ref 28–44)
GLUCOSE SERPL-MCNC: 222 MG/DL (ref 70–110)
HBA1C MFR BLD HPLC: 9.1 % (ref 4–5.6)
HCT VFR BLD AUTO: 32.4 % (ref 37–48.5)
HGB BLD-MCNC: 10.2 G/DL (ref 12–16)
IMM GRANULOCYTES # BLD AUTO: 0.05 K/UL (ref 0–0.04)
IMM GRANULOCYTES NFR BLD AUTO: 0.5 % (ref 0–0.5)
INTERVENTRICULAR SEPTUM: 1.3 CM (ref 0.6–1.1)
IVRT: 105.61 MSEC
LA MAJOR: 6.15 CM
LA MINOR: 6.19 CM
LA WIDTH: 4.83 CM
LEFT ATRIUM SIZE: 3.56 CM
LEFT ATRIUM VOLUME INDEX: 38.3 ML/M2
LEFT ATRIUM VOLUME: 90.18 CM3
LEFT INTERNAL DIMENSION IN SYSTOLE: 3.12 CM (ref 2.1–4)
LEFT VENTRICLE DIASTOLIC VOLUME INDEX: 41.72 ML/M2
LEFT VENTRICLE DIASTOLIC VOLUME: 98.35 ML
LEFT VENTRICLE MASS INDEX: 100 G/M2
LEFT VENTRICLE SYSTOLIC VOLUME INDEX: 16.4 ML/M2
LEFT VENTRICLE SYSTOLIC VOLUME: 38.6 ML
LEFT VENTRICULAR INTERNAL DIMENSION IN DIASTOLE: 5.2 CM (ref 3.5–6)
LEFT VENTRICULAR MASS: 234.61 G
LV LATERAL E/E' RATIO: 10.44 M/S
LV SEPTAL E/E' RATIO: 13.43 M/S
LYMPHOCYTES # BLD AUTO: 0.7 K/UL (ref 1–4.8)
LYMPHOCYTES NFR BLD: 6.6 % (ref 18–48)
MAGNESIUM SERPL-MCNC: 1.7 MG/DL (ref 1.6–2.6)
MCH RBC QN AUTO: 28.8 PG (ref 27–31)
MCHC RBC AUTO-ENTMCNC: 31.5 G/DL (ref 32–36)
MCV RBC AUTO: 92 FL (ref 82–98)
MONOCYTES # BLD AUTO: 0.6 K/UL (ref 0.3–1)
MONOCYTES NFR BLD: 6.1 % (ref 4–15)
MV PEAK A VEL: 1.03 M/S
MV PEAK E VEL: 0.94 M/S
MV STENOSIS PRESSURE HALF TIME: 35.3 MS
MV VALVE AREA P 1/2 METHOD: 6.23 CM2
NEUTROPHILS # BLD AUTO: 8.6 K/UL (ref 1.8–7.7)
NEUTROPHILS NFR BLD: 86.7 % (ref 38–73)
NRBC BLD-RTO: 0 /100 WBC
PISA TR MAX VEL: 2.64 M/S
PLATELET # BLD AUTO: 235 K/UL (ref 150–350)
PLATELET BLD QL SMEAR: ABNORMAL
PMV BLD AUTO: 9.1 FL (ref 9.2–12.9)
POCT GLUCOSE: 173 MG/DL (ref 70–110)
POCT GLUCOSE: 177 MG/DL (ref 70–110)
POCT GLUCOSE: 192 MG/DL (ref 70–110)
POCT GLUCOSE: 204 MG/DL (ref 70–110)
POTASSIUM SERPL-SCNC: 4.5 MMOL/L (ref 3.5–5.1)
PROT SERPL-MCNC: 6.6 G/DL (ref 6–8.4)
RA MAJOR: 5.36 CM
RA PRESSURE: 3 MMHG
RA WIDTH: 3.89 CM
RBC # BLD AUTO: 3.54 M/UL (ref 4–5.4)
RIGHT VENTRICULAR END-DIASTOLIC DIMENSION: 3.66 CM
RV TISSUE DOPPLER FREE WALL SYSTOLIC VELOCITY 1 (APICAL 4 CHAMBER VIEW): 9.71 CM/S
SINUS: 3.5 CM
SODIUM SERPL-SCNC: 138 MMOL/L (ref 136–145)
STJ: 2.68 CM
TDI LATERAL: 0.09 M/S
TDI SEPTAL: 0.07 M/S
TDI: 0.08 M/S
TR MAX PG: 28 MMHG
TRICUSPID ANNULAR PLANE SYSTOLIC EXCURSION: 2.24 CM
TSH SERPL DL<=0.005 MIU/L-ACNC: 0.64 UIU/ML (ref 0.4–4)
TV REST PULMONARY ARTERY PRESSURE: 31 MMHG
WBC # BLD AUTO: 9.95 K/UL (ref 3.9–12.7)

## 2020-11-22 PROCEDURE — 85025 COMPLETE CBC W/AUTO DIFF WBC: CPT

## 2020-11-22 PROCEDURE — 84443 ASSAY THYROID STIM HORMONE: CPT

## 2020-11-22 PROCEDURE — G0378 HOSPITAL OBSERVATION PER HR: HCPCS

## 2020-11-22 PROCEDURE — 97162 PT EVAL MOD COMPLEX 30 MIN: CPT

## 2020-11-22 PROCEDURE — 97535 SELF CARE MNGMENT TRAINING: CPT

## 2020-11-22 PROCEDURE — 83036 HEMOGLOBIN GLYCOSYLATED A1C: CPT

## 2020-11-22 PROCEDURE — 99225 PR SUBSEQUENT OBSERVATION CARE,LEVEL II: ICD-10-PCS | Mod: ,,, | Performed by: HOSPITALIST

## 2020-11-22 PROCEDURE — 83735 ASSAY OF MAGNESIUM: CPT

## 2020-11-22 PROCEDURE — 96372 THER/PROPH/DIAG INJ SC/IM: CPT

## 2020-11-22 PROCEDURE — 97165 OT EVAL LOW COMPLEX 30 MIN: CPT

## 2020-11-22 PROCEDURE — 25000003 PHARM REV CODE 250: Performed by: HOSPITALIST

## 2020-11-22 PROCEDURE — 63600175 PHARM REV CODE 636 W HCPCS: Performed by: HOSPITALIST

## 2020-11-22 PROCEDURE — 96376 TX/PRO/DX INJ SAME DRUG ADON: CPT | Mod: 59

## 2020-11-22 PROCEDURE — 97802 MEDICAL NUTRITION INDIV IN: CPT

## 2020-11-22 PROCEDURE — 97530 THERAPEUTIC ACTIVITIES: CPT

## 2020-11-22 PROCEDURE — 36415 COLL VENOUS BLD VENIPUNCTURE: CPT

## 2020-11-22 PROCEDURE — 80053 COMPREHEN METABOLIC PANEL: CPT

## 2020-11-22 PROCEDURE — 99225 PR SUBSEQUENT OBSERVATION CARE,LEVEL II: CPT | Mod: ,,, | Performed by: HOSPITALIST

## 2020-11-22 PROCEDURE — 96361 HYDRATE IV INFUSION ADD-ON: CPT

## 2020-11-22 RX ORDER — SODIUM CHLORIDE 9 MG/ML
1000 INJECTION, SOLUTION INTRAVENOUS CONTINUOUS
Status: ACTIVE | OUTPATIENT
Start: 2020-11-22 | End: 2020-11-22

## 2020-11-22 RX ADMIN — SODIUM CHLORIDE 1000 ML: 0.9 INJECTION, SOLUTION INTRAVENOUS at 03:11

## 2020-11-22 RX ADMIN — HEPARIN SODIUM 7500 UNITS: 5000 INJECTION INTRAVENOUS; SUBCUTANEOUS at 09:11

## 2020-11-22 RX ADMIN — INSULIN DETEMIR 20 UNITS: 100 INJECTION, SOLUTION SUBCUTANEOUS at 09:11

## 2020-11-22 RX ADMIN — ATORVASTATIN CALCIUM 40 MG: 20 TABLET, FILM COATED ORAL at 09:11

## 2020-11-22 RX ADMIN — HEPARIN SODIUM 7500 UNITS: 5000 INJECTION INTRAVENOUS; SUBCUTANEOUS at 03:11

## 2020-11-22 RX ADMIN — MICONAZOLE NITRATE: 20 CREAM TOPICAL at 09:11

## 2020-11-22 RX ADMIN — ASPIRIN 81 MG CHEWABLE TABLET 81 MG: 81 TABLET CHEWABLE at 09:11

## 2020-11-22 RX ADMIN — CEFTRIAXONE SODIUM 1 G: 1 INJECTION, POWDER, FOR SOLUTION INTRAMUSCULAR; INTRAVENOUS at 09:11

## 2020-11-22 RX ADMIN — CLOPIDOGREL 75 MG: 75 TABLET, FILM COATED ORAL at 09:11

## 2020-11-22 RX ADMIN — HEPARIN SODIUM 7500 UNITS: 5000 INJECTION INTRAVENOUS; SUBCUTANEOUS at 06:11

## 2020-11-22 NOTE — PLAN OF CARE
11/22/20 1608   PATRICK Message   Medicare Outpatient and Observation Notification regarding financial responsibility Given to patient/caregiver;Explained to patient/caregiver;Signed/date by patient/caregiver   Date PATRICK was signed 11/22/20   Time PATRICK was signed [id number]       SW visited pt at  to complete PATRICK. SW explained PATRICK to pt/family member and answered all questions. SW referred pt/family to financial services for any outstanding questions concerning billing. Pt/family expressed understanding and provided signature.     PATRICK document placed in pt chart on floor.     No further needs.     Tamara Berry LMSW  Case Management Social Worker   [facility], [address]

## 2020-11-22 NOTE — PT/OT/SLP EVAL
Physical Therapy Evaluation    Patient Name:  Sherin Ortiz   MRN:  [id number]    Recommendations:     Discharge Recommendations:  nursing facility, skilled   Discharge Equipment Recommendations: none   Barriers to discharge: decreased functional mobility    Assessment:     Sherin Ortiz is a 75 y.o. female admitted with a medical diagnosis of Urinary tract infection without hematuria.  She presents with the following impairments/functional limitations:  weakness, impaired endurance, impaired self care skills, impaired functional mobilty, impaired balance, decreased ROM, decreased upper extremity function, decreased lower extremity function. Tolerated session with complaints of fatigue. Performed mobility mod A-SBA. Patient demo stability sitting EOB with B UE use CGA-SBA. Pt states last time she transferred IND from her WC using B UE was Friday, 11/20/2020. Pt safe to sit EOB assist x 1. Patient would benefit from skilled acute PT 4x/week to improve functional mobility. Recommending pt receives PT services in SNF setting following d/c from hospital once medically cleared.       Rehab Prognosis: Good; patient would benefit from acute skilled PT services to address these deficits and reach maximum level of function.    Recent Surgery: * No surgery found *      Plan:     During this hospitalization, patient to be seen 4 x/week to address the identified rehab impairments via gait training, therapeutic activities, therapeutic exercises, neuromuscular re-education and progress toward the following goals:    · Plan of Care Expires:  12/22/20    Subjective     Chief Complaint: fatigue  Pain/Comfort:  · Pain Rating 1: 0/10    Patients cultural, spiritual, Yazidism conflicts given the current situation: no    Living Environment:  Pt lives alone in [address]. Uses a walk in shower with  and a shower seat. Pt states she IND transfer from  to shower seat. Pt is IND with self care. Not working or driving and reports  she fell a few months ago.  Prior to admission, patients level of function was Mod I with use of power WC for ambulation and tranfers.  Equipment used at home: bedside commode, power chair, wheelchair, shower chair, raised toilet.  DME owned (not currently used): rolling walker and single point cane.  Upon discharge, patient will have assistance from no one, however son lives across street from her and can help if needed.    Objective:     Communicated with RN and OT prior to session.  Patient found HOB elevated with peripheral IV  upon PT entry to room.    General Precautions: Standard, fall   Orthopedic Precautions:N/A   Braces: N/A     Exams:  · Cognitive Exam:  Patient is oriented to Person, Place, Time and Situation  · Gross Motor Coordination:  WFL  · RLE ROM: grossly 50% of ROM  · RLE Strength: grossly 2/5  · LLE ROM: grossly 50% of ROM  · LLE Strength: grossly 2/5    Functional Mobility:  · Bed Mobility:     · Rolling Right: moderate assistance  · Scooting: moderate assistance  · Supine to Sit: moderate assistance  · Sit to Supine: moderate assistance  · Balance: sitting EOB CGA-SBA, demo stability with B UE use    Therapeutic Activities and Exercises:   --Pt educated on: PT role in POC, safety with mobility, benefits of OOB activities, d/c recs and dme.  -scooting in bed  -sitting EOB x 12 min  -instructed on relaxation breathing  -PROM of B LE    AM-PAC 6 CLICK MOBILITY  Total Score:10     Patient left HOB elevated with all lines intact, call button in reach and RN notified.    GOALS:   Multidisciplinary Problems     Physical Therapy Goals        Problem: Physical Therapy Goal    Goal Priority Disciplines Outcome Goal Variances Interventions   Physical Therapy Goal     PT, PT/OT Ongoing, Progressing     Description: Goals to be met by: 20     Patient will increase functional independence with mobility by performin. Supine to sit with Contact Guard Assistance  2. Sit to stand transfer with  Minimal Assistance  3. Bed to chair transfer with Minimal Assistance using LRAD.  4. 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    Benign essential hypertension 11/14/2012    Cataract     s/p phacoemulsification    Chronic kidney disease (CKD), stage III (moderate) 9/12/2013    Diabetic peripheral neuropathy associated with type 2 diabetes mellitus 11/14/2014    causing right hemiparesis    Gait disorder     Hyperlipidemia     Iritis - Both Eyes 6/10/2013    Kidney stone     Lymphedema     Morbid obesity with BMI of 40.0-44.9, adult 2/18/2015    Nephrolithiasis 4/20/2016    NS (nuclear sclerosis) 4/1/2013    Nuclear sclerosis - Both Eyes 4/29/2013    Preseptal cellulitis - Right Eye 4/29/2013    Proliferative diabetic retinopathy - Both Eyes 4/29/2013    Proliferative diabetic retinopathy, both eyes 4/1/2013    PSC (posterior subcapsular cataract) - Both Eyes 4/29/2013    S/P hernia repair 12/19/2012    TIA (transient ischemic attack) 11/18/2014    Tinea pedis 7/24/2012    Tinea pedis is present on both feet.     Type 2 diabetes mellitus with renal manifestations, controlled 12/12/2013    Type 2 diabetes, controlled, with moderate nonproliferative diabetic retinopathy without macular edema 9/17/2015    Ulcer of left lower extremity, limited to breakdown of skin 7/8/2015    Unspecified cerebral artery occlusion with cerebral infarction 11/16/2014    Unspecified venous (peripheral) insufficiency     Ureteral stone with hydronephrosis 1/27/2016    UTI (lower urinary tract infection)     Vaginal infection     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       Past Surgical History:   Procedure Laterality Date    APPENDECTOMY      CATARACT EXTRACTION W/  INTRAOCULAR LENS IMPLANT Left 5/21/2013    CATARACT  EXTRACTION W/  INTRAOCULAR LENS IMPLANT Right 6/4/2013    CHOLECYSTECTOMY      COLONOSCOPY  12/22/2005    normal    ESOPHAGOGASTRODUODENOSCOPY  12/21/2015    hiatal hernia, Schatzki ring    EYE SURGERY Bilateral 2008    laser surgery both eyes    NASAL SEPTUM SURGERY      SUBTOTAL COLECTOMY  12/13/2012    transverse colon, for incarcerated umbilical hernia, Dr. Kat Bower       Time Tracking:     PT Received On: 11/22/20  PT Start Time: 0928     PT Stop Time: 0951  PT Total Time (min): 23 min     Billable Minutes: Evaluation 10 min and Therapeutic Activity 13 min      Sherry Vaughn, SPT  11/22/2020

## 2020-11-22 NOTE — CONSULTS
"  [facility]  Adult Nutrition  Consult Note    SUMMARY     Recommendations     1. Continue Diabetic/Cardiac 2000 kcal diet.   2. Add Boost Glucose Control TID.    Goals: 1. Pt's intake meals >50% by RD follow up.  Nutrition Goal Status: new  Communication of RD Recs: reviewed with physician    Reason for Assessment    Reason For Assessment: consult  Diagnosis: (UTI)  Relevant Medical History: DM2, HTN, HLD, TIA, chronic HF  General Information Comments: Pt reports no appetite since 11/20 and has eaten very little x 2 days. Pt denies difficulty chewing or swallowing. Pt does not follow low sodium diet at home and did not want education; left pamphlet at bedside. Completed NFPE today: pt with moderate hand wasting and edema to legs, no other wasting noted and pt does not meet criteria for malnutrition. Pt willing to try Boost Glucose Control.   Nutrition Discharge Planning: Discharge on Carb Consistent, low sodium and low saturated fat diet.    Nutrition Risk Screen    Nutrition Risk Screen: dysphagia or difficulty swallowing    Nutrition/Diet History    Patient Reported Diet/Restrictions/Preferences: general  Spiritual, Cultural Beliefs, Pentecostalism Practices, Values that Affect Care: no    Anthropometrics    Temp: 98 °F (36.7 °C)  Height: 5' 7" (170.2 cm)  Height (inches): 67 in  Weight Method: Bed Scale  Weight: 130.5 kg (287 lb 11.2 oz)  Weight (lb): 287.7 lb  Ideal Body Weight (IBW), Female: 135 lb       Lab/Procedures/Meds    Pertinent Labs Reviewed: reviewed  Pertinent Labs Comments: A1c 9.1%, BUN 31, Cre 1.8, eGFR 26.8, Glu 222  Pertinent Medications Reviewed: reviewed  Pertinent Medications Comments: statin, detemir, regular insulin    Estimated/Assessed Needs    Weight Used For Calorie Calculations: 130.5 kg (287 lb 11.2 oz)  Energy Calorie Requirements (kcal): 1823 kcal  Energy Need Method: [place]-St Jeor(PAL 1.0)  Protein Requirements: 91-105g  Weight Used For Protein Calculations: 130.5 " kg (287 lb 11.2 oz)        RDA Method (mL): 1823  CHO Requirement: 228g      Nutrition Prescription Ordered    Current Diet Order: Diabetic/Cardiac 2000 kcal diet    Evaluation of Received Nutrient/Fluid Intake    IV Fluid (mL): 1000  I/O: +600ml since admission  Comments: LBM 11/20  % Intake of Estimated Energy Needs: 0 - 25 %  % Meal Intake: 0 - 25 %    Nutrition Risk    Level of Risk/Frequency of Follow-up: low     Assessment and Plan    Nutrition Problem  Inadequate oral intake    Related to (etiology):   Poor appetite    Signs and Symptoms (as evidenced by):   Pt reports poor appetite since 11/20    Interventions(treatment strategy):  Collaboration of care with providers.    Nutrition Diagnosis Status:   New      Monitor and Evaluation    Food and Nutrient Intake: energy intake, food and beverage intake  Food and Nutrient Adminstration: diet order  Knowledge/Beliefs/Attitudes: food and nutrition knowledge/skill  Anthropometric Measurements: weight, weight change  Biochemical Data, Medical Tests and Procedures: electrolyte and renal panel, gastrointestinal profile, glucose/endocrine profile, inflammatory profile, lipid profile  Nutrition-Focused Physical Findings: overall appearance, extremities, muscles and bones, head and eyes, skin     Malnutrition Assessment                 Orbital Region (Subcutaneous Fat Loss): well nourished  Upper Arm Region (Subcutaneous Fat Loss): well nourished  Thoracic and Lumbar Region: well nourished   Islam Region (Muscle Loss): well nourished  Clavicle Bone Region (Muscle Loss): well nourished  Clavicle and Acromion Bone Region (Muscle Loss): well nourished  Scapular Bone Region (Muscle Loss): well nourished  Dorsal Hand (Muscle Loss): moderate depletion  Patellar Region (Muscle Loss): well nourished  Anterior Thigh Region (Muscle Loss): (edema)  Posterior Calf Region (Muscle Loss): (edema)   Edema (Fluid Accumulation): 3-->moderate(calves, thighs)             Nutrition  Follow-Up    RD Follow-up?: Yes

## 2020-11-22 NOTE — PLAN OF CARE
Problem: Adult Inpatient Plan of Care  Goal: Plan of Care Review  Outcome: Ongoing, Progressing     Problem: Adult Inpatient Plan of Care  Goal: Optimal Comfort and Wellbeing  Outcome: Ongoing, Progressing     Problem: Wound  Goal: Optimal Wound Healing  Outcome: Ongoing, Progressing     Problem: UTI (Urinary Tract Infection)  Goal: Improved Infection Symptoms  Outcome: Ongoing, Progressing      AAOx4. Continues with iv atx.Fluids in progress. Remains afebrile.

## 2020-11-22 NOTE — PROGRESS NOTES
Progress Note  Hospital Medicine    Provider team: [facility]  Admit Date: 11/21/2020  Encounter Date: 11/22/2020     SUBJECTIVE:     Follow-up Visit for: Urinary tract infection without hematuria    HPI (See H&P for complete P,F,SHx):  Morbidly obese 75F with T2DM (complicated by polyneuropathy, nephropathy CKDIII-IV borderline, and retinopathy) on long term insulin, essential HTN, mixed HLD, documented history of TIA (on ASA/plavix), and chronic diastolic heart failure here for evaluation of acute cystitis with hematuria. Patient has had approximately 2 days of weakness and nausea with vomiting. Patient reports that she felt fatigued and globally weak last night, vomited once last night and 1 time this morning, and very thirsty with polyuria this morning.  Also reports associated dry cough along with dyspnea on exertion, which is chronic.  Denies fever/chills, chest pain, abdominal pain, diarrhea.      TTE 11/2014  CONCLUSIONS   Technically difficult study     1 - Mild left ventricular enlargement.     2 - Normal left ventricular systolic function (EF 60-65%).     3 - Right ventricular enlargement with normal systolic function.     4 - Grade I left ventricular diastolic dysfunction.     5 - Biatrial enlargement.     6 - Elevated central venous pressure.     Interval history:  Patient in good spirits, offering only persistent c/o fatigue and weakness (global). She denies any CP or SOB. She still has some frequency. She reports that she is still having N/V, but this is improved and she wishes to try a diet today.    Review of Systems:  Review of Systems   Constitutional: Positive for malaise/fatigue. Negative for chills and fever.   HENT: Negative for congestion and sore throat.    Eyes: Negative for photophobia, pain and discharge.   Respiratory: Negative for cough, hemoptysis, sputum production and shortness of breath.    Cardiovascular: Negative for chest pain, palpitations and leg swelling.  "  Gastrointestinal: Negative for abdominal pain, diarrhea, nausea and vomiting.   Genitourinary: Positive for frequency. Negative for dysuria and urgency.   Musculoskeletal: Negative for myalgias and neck pain.   Skin: Negative for itching and rash.   Neurological: Positive for weakness. Negative for sensory change, focal weakness and headaches.   Endo/Heme/Allergies: Negative for polydipsia. Does not bruise/bleed easily.   Psychiatric/Behavioral: Negative for depression and suicidal ideas.     OBJECTIVE:       Intake/Output Summary (Last 24 hours) at 11/22/2020 1415  Last data filed at 11/22/2020 1400  Gross per 24 hour   Intake 1240 ml   Output 400 ml   Net 840 ml     Vital Signs Range (Last 24H):  Temp:  [97.9 °F (36.6 °C)-98.6 °F (37 °C)]   Pulse:  [73-88]   Resp:  [16-25]   BP: (126-156)/(57-82)   SpO2:  [95 %-97 %]   Body mass index is 45.06 kg/m².    Objective:  General Appearance:  Comfortable, well-appearing, in no acute distress and not in pain.    Vital signs: (most recent): Blood pressure (!) 149/68, pulse 73, temperature 97.9 °F (36.6 °C), temperature source Oral, resp. rate 18, height 5' 7" (1.702 m), weight 130.5 kg (287 lb 11.2 oz), SpO2 96 %, not currently breastfeeding.  No fever.    Output: Producing urine and producing stool.    HEENT: Normal HEENT exam.    Lungs:  Normal effort.  Breath sounds clear to auscultation.  She is not in respiratory distress.  No rales or wheezes.    Heart: Normal rate.  Regular rhythm.  S1 normal and S2 normal.  No murmur.   Chest: Symmetric chest wall expansion.   Abdomen: Abdomen is soft.  Bowel sounds are normal.   There is no abdominal tenderness.     Extremities: Normal range of motion.  There is no deformity, effusion, dependent edema or local swelling.    Pulses: Distal pulses are intact.    Neurological: Patient is alert and oriented to person, place and time.  Normal strength.    Pupils:  Pupils are equal, round, and reactive to light.    Skin:  Warm and dry. "      Medications:  Medication list was reviewed in EPIC and changes noted under Assessment/Plan and MAR.    Laboratory:  Recent Labs     11/22/20  0554   WBC 9.95   RBC 3.54*   HGB 10.2*   HCT 32.4*      MCV 92   MCH 28.8   MCHC 31.5*   GRAN 86.7*  8.6*   LYMPH 6.6*  0.7*   MONO 6.1  0.6   EOS 0.0      Recent Labs     11/21/20  1336 11/22/20  0554   * 222*   * 138   K 4.8 4.5    107   CO2 22* 26   BUN 30* 31*   CREATININE 1.8* 1.8*   CALCIUM 8.6* 8.8   ANIONGAP 10 5*   MG 1.7 1.7   PHOS 3.5  --        ASSESSMENT/PLAN:     Active Hospital Problems    Diagnosis  POA    *Urinary tract infection without hematuria [N39.0]  Yes    Hx of transient ischemic attack (TIA) [Z86.73]  Not Applicable    (HFpEF) heart failure with preserved ejection fraction [I50.30]  Yes    Uncontrolled type 2 diabetes mellitus with stage 3 chronic kidney disease, with long-term current use of insulin [E11.22, E11.65, N18.30, Z79.4]  Not Applicable     Chronic    Dermatitis, stasis [I87.2]  Yes     Chronic    Venous insufficiency of leg [I87.2]  Yes     Chronic    Diabetic peripheral neuropathy associated with type 2 diabetes mellitus [E11.42]  Yes     Chronic    Anemia of chronic disease [D63.8]  Yes    Morbid obesity with BMI of 40.0-44.9, adult [E66.01, Z68.41]  Not Applicable    Weakness [R53.1]  Yes    Intractable vomiting with nausea [R11.2]  Yes    Stage 3 chronic kidney disease [N18.30]  Yes     Chronic    Essential hypertension [I10]  Yes     Chronic    Hyperlipidemia LDL goal <100 [E78.5]  Yes     Chronic      Resolved Hospital Problems   No resolved problems to display.      *Acute cystitis with hematuria  Intractable nausea with vomiting  Weakness  - Patient presents with global weakness and non-focal neurologic exam; this is mostly related to ongoing N/V and increased frequency, UTI is of concern, but it could also be UTI +/- another pathology such as viral gastropathy, food-borne illness,  or gastroparesis event (though without chronicity this is less likely)  - C/w CTX daily  - Supportive care  - PT/OT consult     Chronic diastolic heart failure  Essential HTN  - Appears somewhat hypovolemic  - Check 2D ECHO  - IV hydration  - Hold bumex     History of TIA  Mixed HLD  - C/w ASA/plavix as prescribed  - C/w statin     Venous stasis dermatitis b/l  - No clear signs of cellulitis or infection upon takedown of dressings  - Wound care is consulted     Uncontrolled T2DM with neuropathy, nephropathy, and retinopathy  CKDIII  Anemia of chronic renal disease  - Check a1c   - Titrate insulin to effect  - Avoid nephrotoxic agents  - CBC stable     Morbid obesity  Debility  - Nutrition consult for DM2/HF education  - PTOT     DVT PPx: heparin SQ  Diet: cardiac, diabetic  GOC: Previously DNR will clarify with patient     Anticipated discharge date and disposition:   GABE: 11/23; PT/OT for safe disposition.  Damien Reed MD  [facility] Medicine

## 2020-11-22 NOTE — PT/OT/SLP EVAL
"Occupational Therapy   Evaluation    Name: Sherin Ortiz  MRN: [id number]  Admitting Diagnosis:  Urinary tract infection without hematuria      Recommendations:     Discharge Recommendations: nursing facility, skilled  Discharge Equipment Recommendations:  none  Barriers to discharge:  (decreased functional mobility and increased level of assistance at this time)    Assessment:     Sherin Ortiz is a 75 y.o. female with a medical diagnosis of Urinary tract infection without hematuria. Performance deficits affecting function: weakness, impaired endurance, impaired self care skills, impaired functional mobilty, impaired balance, decreased ROM, pain, decreased upper extremity function, decreased lower extremity function. Patient willing to participate in therapy on this date and tolerated session fairly well. Patient demonstrated generalized weakness, impaired functional mobility, and decreased activity endurance. Patient reports to be wheelchair dependent for functional mobility prior to admission and completed last independent wheelchair transfer ~1 month ago. Patient would benefit from continued skilled acute OT 3x/wk to improve functional mobility, prevent further deconditioning, increase independence with ADLs, and address established goals. Recommending SNF once medically appropriate for discharge to increase maximal independence, reduce burden of care, and ensure safety.      Rehab Prognosis: Good; patient would benefit from acute skilled OT services to address these deficits and reach maximum level of function.       Plan:     Patient to be seen 3 x/week to address the above listed problems via self-care/home management, therapeutic activities, therapeutic exercises  · Plan of Care Expires: 11/22/20  · Plan of Care Reviewed with: patient    Subjective     Chief Complaint: "That hurts my legs!"   Patient/Family Comments/goals: To return to PLOF    Occupational Profile:  Living Environment: Patient lives " alone in [facility] with no DES. Bathroom has walk-in shower.   Previous level of function: Patient reports to be mod I PTA for functional mobility with wheelchair and mod I for ADLs with assistance from children as needed.    Roles and Routines: Patient enjoys playing games.    Equipment Used at Home:  power chair, shower chair, walker, rolling, cane, straight  Assistance upon Discharge: Patient will have assistance from children upon discharge.     Pain/Comfort:  · Pain Rating 1: (did not rate)  · Location - Side 1: Bilateral  · Location 1: leg  · Pain Rating Post-Intervention 1: (did not rate)    Patients cultural, spiritual, Judaism conflicts given the current situation: no    Objective:     Communicated with: NSG prior to session.  Patient found HOB elevated with peripheral IV, telemetry upon OT entry to room.    General Precautions: Standard, fall   Orthopedic Precautions:N/A   Braces: N/A     Occupational Performance:    Bed Mobility:    · Patient completed Scooting/Bridging with maximal assistance  · Patient completed Supine to Sit with moderate assistance  · Patient completed Sit to Supine with moderate assistance    Functional Mobility/Transfers:  · Not tested on this date     Activities of Daily Living:  · Grooming: stand by assistance to wipe face while seated EOB     Cognitive/Visual Perceptual:  Cognitive/Psychosocial Skills:     -       Oriented to: Person, Place, Time and Situation   -       Follows Commands/attention:Follows multistep  commands  -       Communication: clear/fluent  -       Memory: No Deficits noted  -       Safety awareness/insight to disability: intact   -       Mood/Affect/Coping skills/emotional control: Appropriate to situation  Visual/Perceptual:      -Intact      Physical Exam:  Skin integrity: Wound B LEs  Upper Extremity Range of Motion:     -       Right Upper Extremity: Deficits: limited ROM due to pain   -       Left Upper Extremity: WFL  Upper Extremity Strength:    -        Right Upper Extremity: 2+/5  -       Left Upper Extremity: 4/5   Strength:    -       Right Upper Extremity: WFL  -       Left Upper Extremity: WFL  Fine Motor Coordination:    -       Intact    AMPAC 6 Click ADL:  AMPAC Total Score: 15    Treatment & Education:  Role of OT and POC  Safety  ADL Retraining  Functional mobility training  Importance of OOB activity  Importance of participation in therapy  Deep breathing technique   Patient repositioned in bed to maximize comfort and prevent skin breakdown  Education:    Patient left HOB elevated with all lines intact, call button in reach and all needs met.     GOALS:   Multidisciplinary Problems     Occupational Therapy Goals        Problem: Occupational Therapy Goal    Goal Priority Disciplines Outcome Interventions   Occupational Therapy Goal     OT, PT/OT Ongoing, Progressing    Description: Goals to be met by: 12/6     Patient will increase functional independence with ADLs by performing:    UE Dressing with [place].  LE Dressing with Modified [place].  Grooming while seated with Modified [place].  Toileting from bedside commode with Modified [place] for hygiene and clothing management.   Toilet transfer to bedside commode with Modified [place], LRAD needed .                     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    Benign essential hypertension 11/14/2012    Cataract     s/p phacoemulsification    Chronic kidney disease (CKD), stage III (moderate) 9/12/2013    Diabetic peripheral neuropathy associated with type 2 diabetes mellitus 11/14/2014    causing right hemiparesis    Gait disorder     Hyperlipidemia     Iritis - Both Eyes 6/10/2013    Kidney stone     Lymphedema     Morbid obesity with BMI of 40.0-44.9, adult 2/18/2015    Nephrolithiasis 4/20/2016    NS (nuclear sclerosis) 4/1/2013    Nuclear sclerosis - Both Eyes 4/29/2013    Preseptal  cellulitis - Right Eye 4/29/2013    Proliferative diabetic retinopathy - Both Eyes 4/29/2013    Proliferative diabetic retinopathy, both eyes 4/1/2013    PSC (posterior subcapsular cataract) - Both Eyes 4/29/2013    S/P hernia repair 12/19/2012    TIA (transient ischemic attack) 11/18/2014    Tinea pedis 7/24/2012    Tinea pedis is present on both feet.    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 Kat Bower       Time Tracking:     OT Date of Treatment: 11/22/20  OT Start Time: 0927  OT Stop Time: 0950  OT Total Time (min): 23 min    Billable Minutes:Evaluation 10  Self Care/Home Management 13    MUSTAPHA Gutierrez  11/22/2020

## 2020-11-22 NOTE — PLAN OF CARE
Problem: Occupational Therapy Goal  Goal: Occupational Therapy Goal  Description: Goals to be met by: 12/6     Patient will increase functional independence with ADLs by performing:    UE Dressing with [place].  LE Dressing with Modified [place].  Grooming while seated with Modified [place].  Toileting from bedside commode with Modified [place] for hygiene and clothing management.   Toilet transfer to bedside commode with Modified [place], LRAD needed.  Bed>Chair transfer with Modified [place].      Outcome: Ongoing, Progressing     Patients goals are set.  MUSTAPHA Gutierrez  11/22/2020

## 2020-11-22 NOTE — PLAN OF CARE
Pt remains free from falls and injuries during shift. Awake, alert, and oriented x 4. Vital signs stable. Denied pain/discomfort. No signs of acute distress noted at this time. SCDs in place, frequent weight shift. Bed in lowest position, wheels locked, and bed alarm on. Call light in reach. Will continue to monitor, safety maintained. Encouraged to call for any assistance.

## 2020-11-22 NOTE — PLAN OF CARE
Problem: Oral Intake Inadequate  Goal: Improved Oral Intake  Outcome: Ongoing, Progressing   Recommendations      1. Continue Diabetic/Cardiac 2000 kcal diet.   2. Add Boost Glucose Control TID.     Goals: 1. Pt's intake meals >50% by RD follow up.  Nutrition Goal Status: new  Communication of RD Recs: reviewed with physician

## 2020-11-22 NOTE — PLAN OF CARE
Problem: Physical Therapy Goal  Goal: Physical Therapy Goal  Description: Goals to be met by: 20     Patient will increase functional independence with mobility by performin. Supine to sit with Contact Guard Assistance  2. Sit to stand transfer with Minimal Assistance  3. Bed to chair transfer with Minimal Assistance using LRAD.  4. Wheelchair propulsion x100 feet with Minimal Assistance using bilateral uppper extremities        Outcome: Ongoing, Progressing     Patient evaluated and treated 2020    ELVIA Fagan  2020

## 2020-11-22 NOTE — PLAN OF CARE
SW completed discharge planning assessment with the patient at bedside. SW verified demographic information listed on the pt.'s Face sheet. Patient expressed that she lives alone, however her son Kannan lives across the street. Pt expressed having no steps outside of her home and no known transportation at this time. Pt expressed that she uses a motorized wheelchair daily, and has additional DME at her home (such as shower chair, bed side commode, etc).     Ame Vasquez MD    [id number] - [place] - [address] AT [facility] [address]  [address] [address]  Phone: [phone] Fax: [phone]    [facility] - [facility] [address]  [address] [address]  Phone: [phone] Fax: [phone]  Payor: [employer] MEDICARE / Plan: [employer] MEDICARE HMO / Product Type: Capitation /        11/22/20 1218   Discharge Assessment   Assessment Type Discharge Planning Assessment   Confirmed/corrected address and phone number on facesheet? Yes   Assessment information obtained from? Patient   Expected Length of Stay (days)   (TBD)   Communicated expected length of stay with patient/caregiver yes   Prior to hospitilization cognitive status: Alert/Oriented   Prior to hospitalization functional status: Assistive Equipment;Independent   Current cognitive status: Alert/Oriented   Current Functional Status: Independent;Assistive Equipment   Facility Arrived From: N/A   Lives With alone   Able to Return to Prior Arrangements no   Is patient able to care for self after discharge? Unable to determine at this time (comments)   Who are your caregiver(s) and their phone number(s)? N/A   Patient's perception of discharge disposition home or selfcare   Readmission Within the Last 30 Days no previous admission in last 30 days   Patient currently being followed by outpatient case management? No   Patient currently receives any other outside  agency services? No   Equipment Currently Used at Home bedside commode;power chair;wheelchair;shower chair;raised toilet   Do you have any problems affording any of your prescribed medications? No   Is the patient taking medications as prescribed? yes   Does the patient have transportation home?   (Patient states that she doesnt know at this time)   Dialysis Name and Scheduled days N/A   Does the patient receive services at the [facility]? No   Discharge Plan A Home Health   Discharge Plan B Skilled Nursing Facility   DME Needed Upon Discharge  none   Patient/Family in Agreement with Plan yes     Paris Hines LMSW  Case Management   [facility]   Ext. [phone]

## 2020-11-23 LAB
ALBUMIN SERPL BCP-MCNC: 2 G/DL (ref 3.5–5.2)
ALP SERPL-CCNC: 150 U/L (ref 55–135)
ALT SERPL W/O P-5'-P-CCNC: 9 U/L (ref 10–44)
ANION GAP SERPL CALC-SCNC: 8 MMOL/L (ref 8–16)
AST SERPL-CCNC: 11 U/L (ref 10–40)
BASOPHILS # BLD AUTO: 0.01 K/UL (ref 0–0.2)
BASOPHILS NFR BLD: 0.1 % (ref 0–1.9)
BILIRUB SERPL-MCNC: 0.6 MG/DL (ref 0.1–1)
BUN SERPL-MCNC: 34 MG/DL (ref 8–23)
CALCIUM SERPL-MCNC: 8.3 MG/DL (ref 8.7–10.5)
CHLORIDE SERPL-SCNC: 106 MMOL/L (ref 95–110)
CO2 SERPL-SCNC: 23 MMOL/L (ref 23–29)
CREAT SERPL-MCNC: 1.6 MG/DL (ref 0.5–1.4)
DIFFERENTIAL METHOD: ABNORMAL
EOSINOPHIL # BLD AUTO: 0 K/UL (ref 0–0.5)
EOSINOPHIL NFR BLD: 0.6 % (ref 0–8)
ERYTHROCYTE [DISTWIDTH] IN BLOOD BY AUTOMATED COUNT: 13.4 % (ref 11.5–14.5)
EST. GFR  (AFRICAN AMERICAN): 35.6 ML/MIN/1.73 M^2
EST. GFR  (NON AFRICAN AMERICAN): 30.9 ML/MIN/1.73 M^2
GLUCOSE SERPL-MCNC: 111 MG/DL (ref 70–110)
HCT VFR BLD AUTO: 31 % (ref 37–48.5)
HGB BLD-MCNC: 9.5 G/DL (ref 12–16)
IMM GRANULOCYTES # BLD AUTO: 0.02 K/UL (ref 0–0.04)
IMM GRANULOCYTES NFR BLD AUTO: 0.3 % (ref 0–0.5)
LYMPHOCYTES # BLD AUTO: 0.7 K/UL (ref 1–4.8)
LYMPHOCYTES NFR BLD: 9.8 % (ref 18–48)
MAGNESIUM SERPL-MCNC: 1.8 MG/DL (ref 1.6–2.6)
MCH RBC QN AUTO: 28.8 PG (ref 27–31)
MCHC RBC AUTO-ENTMCNC: 30.6 G/DL (ref 32–36)
MCV RBC AUTO: 94 FL (ref 82–98)
MONOCYTES # BLD AUTO: 0.5 K/UL (ref 0.3–1)
MONOCYTES NFR BLD: 7.7 % (ref 4–15)
NEUTROPHILS # BLD AUTO: 5.7 K/UL (ref 1.8–7.7)
NEUTROPHILS NFR BLD: 81.5 % (ref 38–73)
NRBC BLD-RTO: 0 /100 WBC
PLATELET # BLD AUTO: 224 K/UL (ref 150–350)
PMV BLD AUTO: 9.5 FL (ref 9.2–12.9)
POCT GLUCOSE: 129 MG/DL (ref 70–110)
POCT GLUCOSE: 147 MG/DL (ref 70–110)
POCT GLUCOSE: 175 MG/DL (ref 70–110)
POCT GLUCOSE: 90 MG/DL (ref 70–110)
POTASSIUM SERPL-SCNC: 3.9 MMOL/L (ref 3.5–5.1)
PROT SERPL-MCNC: 6.1 G/DL (ref 6–8.4)
RBC # BLD AUTO: 3.3 M/UL (ref 4–5.4)
SARS-COV-2 RDRP RESP QL NAA+PROBE: NEGATIVE
SODIUM SERPL-SCNC: 137 MMOL/L (ref 136–145)
WBC # BLD AUTO: 7.04 K/UL (ref 3.9–12.7)

## 2020-11-23 PROCEDURE — 99226 PR SUBSEQUENT OBSERVATION CARE,LEVEL III: CPT | Mod: ,,, | Performed by: HOSPITALIST

## 2020-11-23 PROCEDURE — G0378 HOSPITAL OBSERVATION PER HR: HCPCS

## 2020-11-23 PROCEDURE — 96376 TX/PRO/DX INJ SAME DRUG ADON: CPT

## 2020-11-23 PROCEDURE — 83735 ASSAY OF MAGNESIUM: CPT

## 2020-11-23 PROCEDURE — 96372 THER/PROPH/DIAG INJ SC/IM: CPT | Mod: 59

## 2020-11-23 PROCEDURE — 25000003 PHARM REV CODE 250: Performed by: HOSPITALIST

## 2020-11-23 PROCEDURE — U0002 COVID-19 LAB TEST NON-CDC: HCPCS

## 2020-11-23 PROCEDURE — 36415 COLL VENOUS BLD VENIPUNCTURE: CPT

## 2020-11-23 PROCEDURE — 80053 COMPREHEN METABOLIC PANEL: CPT

## 2020-11-23 PROCEDURE — 85025 COMPLETE CBC W/AUTO DIFF WBC: CPT

## 2020-11-23 PROCEDURE — 99226 PR SUBSEQUENT OBSERVATION CARE,LEVEL III: ICD-10-PCS | Mod: ,,, | Performed by: HOSPITALIST

## 2020-11-23 PROCEDURE — 63600175 PHARM REV CODE 636 W HCPCS: Performed by: HOSPITALIST

## 2020-11-23 RX ORDER — FLUTICASONE PROPIONATE 50 MCG
2 SPRAY, SUSPENSION (ML) NASAL DAILY
Status: DISCONTINUED | OUTPATIENT
Start: 2020-11-24 | End: 2020-11-24 | Stop reason: HOSPADM

## 2020-11-23 RX ORDER — DOXYCYCLINE HYCLATE 100 MG
100 TABLET ORAL EVERY 12 HOURS
Status: DISCONTINUED | OUTPATIENT
Start: 2020-11-23 | End: 2020-11-24

## 2020-11-23 RX ADMIN — ASPIRIN 81 MG CHEWABLE TABLET 81 MG: 81 TABLET CHEWABLE at 10:11

## 2020-11-23 RX ADMIN — INSULIN ASPART 1 UNITS: 100 INJECTION, SOLUTION INTRAVENOUS; SUBCUTANEOUS at 09:11

## 2020-11-23 RX ADMIN — CLOPIDOGREL 75 MG: 75 TABLET, FILM COATED ORAL at 10:11

## 2020-11-23 RX ADMIN — ATORVASTATIN CALCIUM 40 MG: 20 TABLET, FILM COATED ORAL at 09:11

## 2020-11-23 RX ADMIN — CEFTRIAXONE SODIUM 1 G: 1 INJECTION, POWDER, FOR SOLUTION INTRAMUSCULAR; INTRAVENOUS at 10:11

## 2020-11-23 RX ADMIN — INSULIN DETEMIR 20 UNITS: 100 INJECTION, SOLUTION SUBCUTANEOUS at 09:11

## 2020-11-23 RX ADMIN — HEPARIN SODIUM 7500 UNITS: 5000 INJECTION INTRAVENOUS; SUBCUTANEOUS at 10:11

## 2020-11-23 RX ADMIN — ACETAMINOPHEN 650 MG: 325 TABLET ORAL at 10:11

## 2020-11-23 RX ADMIN — MICONAZOLE NITRATE: 20 CREAM TOPICAL at 09:11

## 2020-11-23 RX ADMIN — HEPARIN SODIUM 7500 UNITS: 5000 INJECTION INTRAVENOUS; SUBCUTANEOUS at 04:11

## 2020-11-23 RX ADMIN — DOXYCYCLINE HYCLATE 100 MG: 100 TABLET, COATED ORAL at 09:11

## 2020-11-23 NOTE — PLAN OF CARE
[place]IA completed LOCET and faxed PASRR to state, waiting on 142. SW is in communication with patient's CM and patient's Care Team. SW will continue to follow.     GER[place]IA uploaded 142 to [facility].    Shelbi Grover LMSW   - [facility]  Ext. [phone]

## 2020-11-23 NOTE — PLAN OF CARE
SW faxed referral to [facility] via  for review. GERMANIA will continue to follow.      11/23/20 0912   Post-Acute Status   Post-Acute Authorization Placement   Post-Acute Placement Status Referrals Sent     2:40 PM  [facility] - Accepted    Shelbi Grover LMSW   - [facility]  Ext. [phone]

## 2020-11-23 NOTE — PROGRESS NOTES
Progress Note  Hospital Medicine    Provider team: [facility]  Admit Date: 11/21/2020  Encounter Date: 11/23/2020     SUBJECTIVE:     Follow-up Visit for: Urinary tract infection without hematuria    HPI (See H&P for complete P,F,SHx):  Morbidly obese 75F with T2DM (complicated by polyneuropathy, nephropathy CKDIII-IV borderline, and retinopathy) on long term insulin, essential HTN, mixed HLD, documented history of TIA (on ASA/plavix), and chronic diastolic heart failure here for evaluation of acute cystitis with hematuria. Patient has had approximately 2 days of weakness and nausea with vomiting. Patient reports that she felt fatigued and globally weak last night, vomited once last night and 1 time this morning, and very thirsty with polyuria this morning.  Also reports associated dry cough along with dyspnea on exertion, which is chronic.  Denies fever/chills, chest pain, abdominal pain, diarrhea.      TTE 11/22/2020  · Mild left atrial enlargement.  · There is left ventricular eccentric hypertrophy.  · The left ventricle is normal in size with normal systolic function.   · The estimated ejection fraction is 60%.  · Indeterminate diastolic function.  · Normal right ventricular size with normal RVSF.  · The estimated PA systolic pressure is 31 mmHg.  · Normal central venous pressure (3 mmHg).    Interval history:  Patient in good spirits, offering only persistent c/o fatigue and weakness (global). She denies any CP or SOB. She still has some frequency. She reports that she is still having N/V, but this is improved and she wishes to try a diet today.    Review of Systems:  Review of Systems   Constitutional: Positive for malaise/fatigue. Negative for chills and fever.   HENT: Negative for congestion and sore throat.    Eyes: Negative for photophobia, pain and discharge.   Respiratory: Negative for cough, hemoptysis, sputum production and shortness of breath.    Cardiovascular: Negative for chest pain,  "palpitations and leg swelling.   Gastrointestinal: Negative for abdominal pain, diarrhea, nausea and vomiting.   Genitourinary: Positive for frequency. Negative for dysuria and urgency.   Musculoskeletal: Negative for myalgias and neck pain.   Skin: Negative for itching and rash.   Neurological: Positive for weakness. Negative for sensory change, focal weakness and headaches.   Endo/Heme/Allergies: Negative for polydipsia. Does not bruise/bleed easily.   Psychiatric/Behavioral: Negative for depression and suicidal ideas.     OBJECTIVE:       Intake/Output Summary (Last 24 hours) at 11/23/2020 0928  Last data filed at 11/22/2020 2316  Gross per 24 hour   Intake 702 ml   Output 250 ml   Net 452 ml     Vital Signs Range (Last 24H):  Temp:  [97.9 °F (36.6 °C)-98.9 °F (37.2 °C)]   Pulse:  [67-77]   Resp:  [17-20]   BP: (121-149)/(61-70)   SpO2:  [95 %-99 %]   Body mass index is 45.58 kg/m².    Objective:  General Appearance:  Comfortable, well-appearing, in no acute distress and not in pain.    Vital signs: (most recent): Blood pressure 129/70, pulse 74, temperature 98.9 °F (37.2 °C), temperature source Oral, resp. rate 20, height 5' 7" (1.702 m), weight 132 kg (291 lb 0.1 oz), SpO2 97 %, not currently breastfeeding.  No fever.    Output: Producing urine and producing stool.    HEENT: Normal HEENT exam.    Lungs:  Normal effort.  Breath sounds clear to auscultation.  She is not in respiratory distress.  No rales or wheezes.    Heart: Normal rate.  Regular rhythm.  S1 normal and S2 normal.  No murmur.   Chest: Symmetric chest wall expansion.   Abdomen: Abdomen is soft.  Bowel sounds are normal.   There is no abdominal tenderness.     Extremities: Normal range of motion.  There is no deformity, effusion, dependent edema or local swelling.    Pulses: Distal pulses are intact.    Neurological: Patient is alert and oriented to person, place and time.  Normal strength.    Pupils:  Pupils are equal, round, and reactive to " light.    Skin:  Warm and dry.      Medications:  Medication list was reviewed in EPIC and changes noted under Assessment/Plan and MAR.    Laboratory:  Recent Labs     11/23/20  0616   WBC 7.04   RBC 3.30*   HGB 9.5*   HCT 31.0*      MCV 94   MCH 28.8   MCHC 30.6*   GRAN 81.5*  5.7   LYMPH 9.8*  0.7*   MONO 7.7  0.5   EOS 0.0      Recent Labs     11/21/20  1336  11/23/20  0616   *   < > 111*   *   < > 137   K 4.8   < > 3.9      < > 106   CO2 22*   < > 23   BUN 30*   < > 34*   CREATININE 1.8*   < > 1.6*   CALCIUM 8.6*   < > 8.3*   ANIONGAP 10   < > 8   MG 1.7   < > 1.8   PHOS 3.5  --   --     < > = values in this interval not displayed.       ASSESSMENT/PLAN:     Active Hospital Problems    Diagnosis  POA    *Urinary tract infection without hematuria [N39.0]  Yes    Hx of transient ischemic attack (TIA) [Z86.73]  Not Applicable    (HFpEF) heart failure with preserved ejection fraction [I50.30]  Yes    Uncontrolled type 2 diabetes mellitus with stage 3 chronic kidney disease, with long-term current use of insulin [E11.22, E11.65, N18.30, Z79.4]  Not Applicable     Chronic    Dermatitis, stasis [I87.2]  Yes     Chronic    Venous insufficiency of leg [I87.2]  Yes     Chronic    Diabetic peripheral neuropathy associated with type 2 diabetes mellitus [E11.42]  Yes     Chronic    Anemia of chronic disease [D63.8]  Yes    Morbid obesity with BMI of 40.0-44.9, adult [E66.01, Z68.41]  Not Applicable    Weakness [R53.1]  Yes    Intractable vomiting with nausea [R11.2]  Yes    Stage 3 chronic kidney disease [N18.30]  Yes     Chronic    Essential hypertension [I10]  Yes     Chronic    Hyperlipidemia LDL goal <100 [E78.5]  Yes     Chronic      Resolved Hospital Problems   No resolved problems to display.      *Acute cystitis with hematuria  Intractable nausea with vomiting  Weakness  - Patient presents with global weakness and non-focal neurologic exam; this is mostly related to ongoing  N/V and increased frequency, UTI is of concern, but it could also be UTI +/- another pathology such as viral gastropathy, food-borne illness, or gastroparesis event (though without chronicity this is less likely)  - Change abx to ertapenem given prior resistance to CTX  - Supportive care  - PT/OT consult     Chronic diastolic heart failure  Essential HTN  - Appears somewhat hypovolemic  - 2D ECHO reviewed  - IV hydration  - Hold bumex     History of TIA  Mixed HLD  - C/w ASA/plavix as prescribed  - C/w statin     Venous stasis dermatitis b/l  - No clear signs of cellulitis or infection upon takedown of dressings  - Wound care is consulted  - Will get DISHA and arterial US to r/o PAD     Uncontrolled T2DM with neuropathy, nephropathy, and retinopathy  CKDIII  Anemia of chronic renal disease  - a1c 9 indicates poor control  - Titrate insulin to effect  - Avoid nephrotoxic agents  - CBC stable     Morbid obesity  Debility  - Nutrition consult for DM2/HF education  - PTOT     DVT PPx: heparin SQ  Diet: cardiac, diabetic  GOC: Previously DNR will clarify with patient     Anticipated discharge date and disposition:   GABE: 11/24; SNF.  Damien Reed MD  [facility]

## 2020-11-24 ENCOUNTER — HOSPITAL ENCOUNTER (INPATIENT)
Facility: HOSPITAL | Age: 75
LOS: 18 days | Discharge: HOME HEALTH | DRG: 690 | End: 2020-12-12
Attending: HOSPITALIST | Admitting: EMERGENCY MEDICINE
Payer: MEDICARE

## 2020-11-24 VITALS
TEMPERATURE: 99 F | WEIGHT: 289.25 LBS | BODY MASS INDEX: 45.4 KG/M2 | HEIGHT: 67 IN | SYSTOLIC BLOOD PRESSURE: 135 MMHG | HEART RATE: 70 BPM | RESPIRATION RATE: 16 BRPM | DIASTOLIC BLOOD PRESSURE: 62 MMHG | OXYGEN SATURATION: 96 %

## 2020-11-24 DIAGNOSIS — N18.32 STAGE 3B CHRONIC KIDNEY DISEASE: Primary | Chronic | ICD-10-CM

## 2020-11-24 DIAGNOSIS — N39.0 URINARY TRACT INFECTION WITHOUT HEMATURIA: ICD-10-CM

## 2020-11-24 DIAGNOSIS — N39.0 URINARY TRACT INFECTION WITHOUT HEMATURIA, SITE UNSPECIFIED: ICD-10-CM

## 2020-11-24 LAB
ALBUMIN SERPL BCP-MCNC: 2 G/DL (ref 3.5–5.2)
ALP SERPL-CCNC: 195 U/L (ref 55–135)
ALT SERPL W/O P-5'-P-CCNC: 9 U/L (ref 10–44)
ANION GAP SERPL CALC-SCNC: 8 MMOL/L (ref 8–16)
AST SERPL-CCNC: 13 U/L (ref 10–40)
BACTERIA UR CULT: ABNORMAL
BASOPHILS # BLD AUTO: 0.03 K/UL (ref 0–0.2)
BASOPHILS NFR BLD: 0.7 % (ref 0–1.9)
BILIRUB SERPL-MCNC: 0.4 MG/DL (ref 0.1–1)
BUN SERPL-MCNC: 36 MG/DL (ref 8–23)
CALCIUM SERPL-MCNC: 8.6 MG/DL (ref 8.7–10.5)
CHLORIDE SERPL-SCNC: 107 MMOL/L (ref 95–110)
CO2 SERPL-SCNC: 24 MMOL/L (ref 23–29)
CREAT SERPL-MCNC: 1.7 MG/DL (ref 0.5–1.4)
DIFFERENTIAL METHOD: ABNORMAL
EOSINOPHIL # BLD AUTO: 0.1 K/UL (ref 0–0.5)
EOSINOPHIL NFR BLD: 1.4 % (ref 0–8)
ERYTHROCYTE [DISTWIDTH] IN BLOOD BY AUTOMATED COUNT: 13.3 % (ref 11.5–14.5)
EST. GFR  (AFRICAN AMERICAN): 33.1 ML/MIN/1.73 M^2
EST. GFR  (NON AFRICAN AMERICAN): 28.7 ML/MIN/1.73 M^2
GLUCOSE SERPL-MCNC: 132 MG/DL (ref 70–110)
HCT VFR BLD AUTO: 29.5 % (ref 37–48.5)
HGB BLD-MCNC: 9.3 G/DL (ref 12–16)
IMM GRANULOCYTES # BLD AUTO: 0.02 K/UL (ref 0–0.04)
IMM GRANULOCYTES NFR BLD AUTO: 0.5 % (ref 0–0.5)
LEFT ABI: 0.91
LEFT ARM BP: 157 MMHG
LEFT DORSALIS PEDIS: 143 MMHG
LEFT POSTERIOR TIBIAL: 141 MMHG
LYMPHOCYTES # BLD AUTO: 0.7 K/UL (ref 1–4.8)
LYMPHOCYTES NFR BLD: 15.8 % (ref 18–48)
MAGNESIUM SERPL-MCNC: 1.9 MG/DL (ref 1.6–2.6)
MCH RBC QN AUTO: 29 PG (ref 27–31)
MCHC RBC AUTO-ENTMCNC: 31.5 G/DL (ref 32–36)
MCV RBC AUTO: 92 FL (ref 82–98)
MONOCYTES # BLD AUTO: 0.4 K/UL (ref 0.3–1)
MONOCYTES NFR BLD: 8.9 % (ref 4–15)
NEUTROPHILS # BLD AUTO: 3.1 K/UL (ref 1.8–7.7)
NEUTROPHILS NFR BLD: 72.7 % (ref 38–73)
NRBC BLD-RTO: 0 /100 WBC
PLATELET # BLD AUTO: 234 K/UL (ref 150–350)
PMV BLD AUTO: 9.3 FL (ref 9.2–12.9)
POCT GLUCOSE: 107 MG/DL (ref 70–110)
POCT GLUCOSE: 148 MG/DL (ref 70–110)
POCT GLUCOSE: 174 MG/DL (ref 70–110)
POCT GLUCOSE: 82 MG/DL (ref 70–110)
POTASSIUM SERPL-SCNC: 3.9 MMOL/L (ref 3.5–5.1)
PROT SERPL-MCNC: 6.3 G/DL (ref 6–8.4)
RBC # BLD AUTO: 3.21 M/UL (ref 4–5.4)
RIGHT ABI: 1.62
RIGHT ARM BP: 152 MMHG
RIGHT DORSALIS PEDIS: 255 MMHG
RIGHT POSTERIOR TIBIAL: 169 MMHG
SODIUM SERPL-SCNC: 139 MMOL/L (ref 136–145)
WBC # BLD AUTO: 4.25 K/UL (ref 3.9–12.7)

## 2020-11-24 PROCEDURE — 11000004 HC SNF PRIVATE

## 2020-11-24 PROCEDURE — G0378 HOSPITAL OBSERVATION PER HR: HCPCS

## 2020-11-24 PROCEDURE — 25000003 PHARM REV CODE 250: Performed by: HOSPITALIST

## 2020-11-24 PROCEDURE — 83735 ASSAY OF MAGNESIUM: CPT

## 2020-11-24 PROCEDURE — 85025 COMPLETE CBC W/AUTO DIFF WBC: CPT

## 2020-11-24 PROCEDURE — 96372 THER/PROPH/DIAG INJ SC/IM: CPT

## 2020-11-24 PROCEDURE — 63600175 PHARM REV CODE 636 W HCPCS: Performed by: HOSPITALIST

## 2020-11-24 PROCEDURE — C9399 UNCLASSIFIED DRUGS OR BIOLOG: HCPCS | Performed by: HOSPITALIST

## 2020-11-24 PROCEDURE — 99217 PR OBSERVATION CARE DISCHARGE: ICD-10-PCS | Mod: ,,, | Performed by: HOSPITALIST

## 2020-11-24 PROCEDURE — 99217 PR OBSERVATION CARE DISCHARGE: CPT | Mod: ,,, | Performed by: HOSPITALIST

## 2020-11-24 PROCEDURE — 80053 COMPREHEN METABOLIC PANEL: CPT

## 2020-11-24 PROCEDURE — 36415 COLL VENOUS BLD VENIPUNCTURE: CPT

## 2020-11-24 RX ORDER — ACETAMINOPHEN 325 MG/1
650 TABLET ORAL EVERY 4 HOURS PRN
Status: CANCELLED | OUTPATIENT
Start: 2020-11-24

## 2020-11-24 RX ORDER — GLUCAGON 1 MG
1 KIT INJECTION
Status: CANCELLED | OUTPATIENT
Start: 2020-11-24

## 2020-11-24 RX ORDER — ACETAMINOPHEN 325 MG/1
650 TABLET ORAL EVERY 4 HOURS PRN
Status: DISCONTINUED | OUTPATIENT
Start: 2020-11-24 | End: 2020-12-12 | Stop reason: HOSPADM

## 2020-11-24 RX ORDER — HEPARIN SODIUM 5000 [USP'U]/ML
7500 INJECTION, SOLUTION INTRAVENOUS; SUBCUTANEOUS EVERY 8 HOURS
Status: DISCONTINUED | OUTPATIENT
Start: 2020-11-24 | End: 2020-12-12 | Stop reason: HOSPADM

## 2020-11-24 RX ORDER — IBUPROFEN 200 MG
24 TABLET ORAL
Status: CANCELLED | OUTPATIENT
Start: 2020-11-24

## 2020-11-24 RX ORDER — ONDANSETRON 4 MG/1
4 TABLET, ORALLY DISINTEGRATING ORAL EVERY 8 HOURS PRN
Status: DISCONTINUED | OUTPATIENT
Start: 2020-11-24 | End: 2020-12-12 | Stop reason: HOSPADM

## 2020-11-24 RX ORDER — NAPROXEN SODIUM 220 MG/1
81 TABLET, FILM COATED ORAL DAILY
Status: DISCONTINUED | OUTPATIENT
Start: 2020-11-25 | End: 2020-12-12 | Stop reason: HOSPADM

## 2020-11-24 RX ORDER — CLOPIDOGREL BISULFATE 75 MG/1
75 TABLET ORAL DAILY
Status: DISCONTINUED | OUTPATIENT
Start: 2020-11-25 | End: 2020-12-12 | Stop reason: HOSPADM

## 2020-11-24 RX ORDER — INSULIN ASPART 100 [IU]/ML
1-10 INJECTION, SOLUTION INTRAVENOUS; SUBCUTANEOUS
Status: DISCONTINUED | OUTPATIENT
Start: 2020-11-24 | End: 2020-11-30

## 2020-11-24 RX ORDER — GLUCAGON 1 MG
1 KIT INJECTION
Status: DISCONTINUED | OUTPATIENT
Start: 2020-11-24 | End: 2020-11-30

## 2020-11-24 RX ORDER — ONDANSETRON 4 MG/1
4 TABLET, ORALLY DISINTEGRATING ORAL EVERY 8 HOURS PRN
Status: CANCELLED | OUTPATIENT
Start: 2020-11-24

## 2020-11-24 RX ORDER — MAG HYDROX/ALUMINUM HYD/SIMETH 200-200-20
15 SUSPENSION, ORAL (FINAL DOSE FORM) ORAL EVERY 6 HOURS PRN
Status: CANCELLED | OUTPATIENT
Start: 2020-11-24

## 2020-11-24 RX ORDER — FLUTICASONE PROPIONATE 50 MCG
2 SPRAY, SUSPENSION (ML) NASAL DAILY
Status: CANCELLED | OUTPATIENT
Start: 2020-11-25

## 2020-11-24 RX ORDER — MAG HYDROX/ALUMINUM HYD/SIMETH 200-200-20
15 SUSPENSION, ORAL (FINAL DOSE FORM) ORAL EVERY 6 HOURS PRN
Status: DISCONTINUED | OUTPATIENT
Start: 2020-11-24 | End: 2020-12-12 | Stop reason: HOSPADM

## 2020-11-24 RX ORDER — IBUPROFEN 200 MG
16 TABLET ORAL
Status: CANCELLED | OUTPATIENT
Start: 2020-11-24

## 2020-11-24 RX ORDER — NAPROXEN SODIUM 220 MG/1
81 TABLET, FILM COATED ORAL DAILY
Status: CANCELLED | OUTPATIENT
Start: 2020-11-25

## 2020-11-24 RX ORDER — INSULIN ASPART 100 [IU]/ML
1-10 INJECTION, SOLUTION INTRAVENOUS; SUBCUTANEOUS
Status: CANCELLED | OUTPATIENT
Start: 2020-11-24

## 2020-11-24 RX ORDER — HEPARIN SODIUM 5000 [USP'U]/ML
7500 INJECTION, SOLUTION INTRAVENOUS; SUBCUTANEOUS EVERY 8 HOURS
Status: CANCELLED | OUTPATIENT
Start: 2020-11-24

## 2020-11-24 RX ORDER — IBUPROFEN 200 MG
24 TABLET ORAL
Status: DISCONTINUED | OUTPATIENT
Start: 2020-11-24 | End: 2020-11-30

## 2020-11-24 RX ORDER — CLOPIDOGREL BISULFATE 75 MG/1
75 TABLET ORAL DAILY
Status: CANCELLED | OUTPATIENT
Start: 2020-11-25

## 2020-11-24 RX ORDER — FLUTICASONE PROPIONATE 50 MCG
2 SPRAY, SUSPENSION (ML) NASAL DAILY
Status: DISCONTINUED | OUTPATIENT
Start: 2020-11-25 | End: 2020-12-10

## 2020-11-24 RX ORDER — ATORVASTATIN CALCIUM 20 MG/1
40 TABLET, FILM COATED ORAL NIGHTLY
Status: CANCELLED | OUTPATIENT
Start: 2020-11-24

## 2020-11-24 RX ORDER — IBUPROFEN 200 MG
16 TABLET ORAL
Status: DISCONTINUED | OUTPATIENT
Start: 2020-11-24 | End: 2020-11-30

## 2020-11-24 RX ORDER — ATORVASTATIN CALCIUM 20 MG/1
40 TABLET, FILM COATED ORAL NIGHTLY
Status: DISCONTINUED | OUTPATIENT
Start: 2020-11-24 | End: 2020-12-12 | Stop reason: HOSPADM

## 2020-11-24 RX ADMIN — INSULIN DETEMIR 20 UNITS: 100 INJECTION, SOLUTION SUBCUTANEOUS at 10:11

## 2020-11-24 RX ADMIN — CLOPIDOGREL 75 MG: 75 TABLET, FILM COATED ORAL at 11:11

## 2020-11-24 RX ADMIN — MICONAZOLE NITRATE: 20 CREAM TOPICAL at 11:11

## 2020-11-24 RX ADMIN — ASPIRIN 81 MG CHEWABLE TABLET 81 MG: 81 TABLET CHEWABLE at 11:11

## 2020-11-24 RX ADMIN — DOXYCYCLINE HYCLATE 100 MG: 100 TABLET, COATED ORAL at 11:11

## 2020-11-24 RX ADMIN — ATORVASTATIN CALCIUM 40 MG: 20 TABLET, FILM COATED ORAL at 10:11

## 2020-11-24 RX ADMIN — HEPARIN SODIUM 7500 UNITS: 5000 INJECTION INTRAVENOUS; SUBCUTANEOUS at 06:11

## 2020-11-24 RX ADMIN — HEPARIN SODIUM 7500 UNITS: 5000 INJECTION INTRAVENOUS; SUBCUTANEOUS at 10:11

## 2020-11-24 NOTE — PLAN OF CARE
[facility] - Accepted. SW will continue to follow.      11/24/20 1033   Post-Acute Status   Post-Acute Authorization Placement   Post-Acute Placement Status Awaiting Orders for SNF     Shelbi Grover LMSW   - [facility]  Ext. [phone]

## 2020-11-24 NOTE — PT/OT/SLP PROGRESS
Physical Therapy      Patient Name:  Sherin Ortiz   MRN:  [id number]    Patient not seen today secondary to patient awaiting transport for d/c to [facility]. PT to follow up at a later date if pt does not d/c as planned.     TIGIST JORDAN, PT

## 2020-11-24 NOTE — PLAN OF CARE
Problem: Fall Injury Risk  Goal: Absence of Fall and Fall-Related Injury  Outcome: Ongoing, Progressing   Problem: Adult Inpatient Plan of Care  Goal: Optimal Comfort and Wellbeing  Outcome: Ongoing, Progressing   Problem: Wound  Goal: Optimal Wound Healing  Outcome: Ongoing, Progressing   Problem: UTI (Urinary Tract Infection)  Goal: Improved Infection Symptoms  Outcome: Ongoing, Progressing   Safety precautions maintained this shift, bed in low and locked position, call light and personal items in reach. No c/o pain or discomfort, afebrile. Voices no concerns at this time, will cont to monitor.

## 2020-11-24 NOTE — PT/OT/SLP PROGRESS
Occupational Therapy      Patient Name:  Sherin Ortiz   MRN:  [id number]    Patient not seen today secondary to (pt declined session stating that she was being discharged in 1/2 hour from hospital.).     AGUILAR Mcclure  11/24/2020

## 2020-11-24 NOTE — PLAN OF CARE
[place]IA scheduled d/c transportation to [facility] through [facility]. Patient is scheduled to be picked up at 2:00 pm. GERMANIA provided patient's nurse with number for report [phone]. GERMANIA is in communication with patient's CM and patient's Care Team.      11/24/20 1240   Post-Acute Status   Post-Acute Authorization Placement   Post-Acute Placement Status Set-up Complete     Shelbi Grover LMSW   - [facility]  Ext. [phone]

## 2020-11-24 NOTE — PLAN OF CARE
Problem: Adult Inpatient Plan of Care  Goal: Plan of Care Review  Outcome: Ongoing, Progressing     Problem: Adult Inpatient Plan of Care  Goal: Optimal Comfort and Wellbeing  Outcome: Ongoing, Progressing     Problem: Wound  Goal: Optimal Wound Healing  Outcome: Ongoing, Progressing     Problem: UTI (Urinary Tract Infection)  Goal: Improved Infection Symptoms  Outcome: Ongoing, Progressing       AAOx4. Continues iv atx. Remains afebrile. Comfort measures provided.

## 2020-11-24 NOTE — PLAN OF CARE
Patient has been accepted to [facility] and is pending insurance auth. Per Bridgett, auth was submitted at 12:30 pm on yesterday. SW will escalate. SW will continue to follow.      11/24/20 0925   Post-Acute Status   Post-Acute Authorization Placement   Post-Acute Placement Status Pending Payor Review     9:30 AM  SW escalated to Elizabeth. SW will continue to follow.     10:04 AM  Patient received auth from [facility].     Shelbi Grover LMSW   - [facility]  Ext. [phone]

## 2020-11-24 NOTE — PROGRESS NOTES
Report given to receiving nurse at [facility]  Transport arrived to take patient.  1st dose of IV invanz sent with patient and nasal spray also sent.   piv removed and new longer piv placed to R fa by picc team for IV abx.

## 2020-11-25 ENCOUNTER — LAB VISIT (OUTPATIENT)
Dept: LAB | Facility: OTHER | Age: 75
End: 2020-11-25
Payer: MEDICARE

## 2020-11-25 DIAGNOSIS — Z03.818 ENCOUNTER FOR OBSERVATION FOR SUSPECTED EXPOSURE TO OTHER BIOLOGICAL AGENTS RULED OUT: ICD-10-CM

## 2020-11-25 LAB
POCT GLUCOSE: 100 MG/DL (ref 70–110)
POCT GLUCOSE: 128 MG/DL (ref 70–110)
POCT GLUCOSE: 163 MG/DL (ref 70–110)
POCT GLUCOSE: 171 MG/DL (ref 70–110)

## 2020-11-25 PROCEDURE — 97165 OT EVAL LOW COMPLEX 30 MIN: CPT

## 2020-11-25 PROCEDURE — 97535 SELF CARE MNGMENT TRAINING: CPT

## 2020-11-25 PROCEDURE — 25000242 PHARM REV CODE 250 ALT 637 W/ HCPCS: Performed by: HOSPITALIST

## 2020-11-25 PROCEDURE — 97530 THERAPEUTIC ACTIVITIES: CPT

## 2020-11-25 PROCEDURE — 97162 PT EVAL MOD COMPLEX 30 MIN: CPT

## 2020-11-25 PROCEDURE — U0003 INFECTIOUS AGENT DETECTION BY NUCLEIC ACID (DNA OR RNA); SEVERE ACUTE RESPIRATORY SYNDROME CORONAVIRUS 2 (SARS-COV-2) (CORONAVIRUS DISEASE [COVID-19]), AMPLIFIED PROBE TECHNIQUE, MAKING USE OF HIGH THROUGHPUT TECHNOLOGIES AS DESCRIBED BY CMS-2020-01-R: HCPCS

## 2020-11-25 PROCEDURE — 63600175 PHARM REV CODE 636 W HCPCS: Performed by: HOSPITALIST

## 2020-11-25 PROCEDURE — 97802 MEDICAL NUTRITION INDIV IN: CPT

## 2020-11-25 PROCEDURE — 11000004 HC SNF PRIVATE

## 2020-11-25 PROCEDURE — 25000003 PHARM REV CODE 250: Performed by: NURSE PRACTITIONER

## 2020-11-25 PROCEDURE — 25000003 PHARM REV CODE 250: Performed by: HOSPITALIST

## 2020-11-25 RX ORDER — BUMETANIDE 1 MG/1
2 TABLET ORAL DAILY
Status: DISCONTINUED | OUTPATIENT
Start: 2020-11-26 | End: 2020-12-07

## 2020-11-25 RX ADMIN — FLUTICASONE PROPIONATE 100 MCG: 50 SPRAY, METERED NASAL at 09:11

## 2020-11-25 RX ADMIN — INSULIN DETEMIR 20 UNITS: 100 INJECTION, SOLUTION SUBCUTANEOUS at 09:11

## 2020-11-25 RX ADMIN — ERTAPENEM SODIUM 1 G: 1 INJECTION INTRAMUSCULAR; INTRAVENOUS at 02:11

## 2020-11-25 RX ADMIN — CLOPIDOGREL 75 MG: 75 TABLET, FILM COATED ORAL at 09:11

## 2020-11-25 RX ADMIN — MICONAZOLE NITRATE: 20 OINTMENT TOPICAL at 09:11

## 2020-11-25 RX ADMIN — HEPARIN SODIUM 7500 UNITS: 5000 INJECTION INTRAVENOUS; SUBCUTANEOUS at 06:11

## 2020-11-25 RX ADMIN — HEPARIN SODIUM 7500 UNITS: 5000 INJECTION INTRAVENOUS; SUBCUTANEOUS at 02:11

## 2020-11-25 RX ADMIN — HEPARIN SODIUM 7500 UNITS: 5000 INJECTION INTRAVENOUS; SUBCUTANEOUS at 09:11

## 2020-11-25 RX ADMIN — ATORVASTATIN CALCIUM 40 MG: 20 TABLET, FILM COATED ORAL at 09:11

## 2020-11-25 RX ADMIN — ASPIRIN 81 MG CHEWABLE TABLET 81 MG: 81 TABLET CHEWABLE at 09:11

## 2020-11-25 NOTE — PLAN OF CARE
Problem: Oral Intake Inadequate  Goal: Improved Oral Intake  Outcome: Ongoing, Progressing   Recommendations      1. Continue Diabetic/Cardiac 2000 kcal diet.   2. Add Boost Glucose Control TID.     Goals: 1. Pt's intake meals >50% by RD follow up.  Nutrition Goal Status: new  Communication of RD Recs: reviewed with physician  Assessment and Plan  Nutrition Problem  Inadequate oral intake     Related to (etiology):   Decreased appetite     Signs and Symptoms (as evidenced by):   Pt reports poor appetite, decreased intake noted in hospital admission.     Interventions(treatment strategy):  Collaboration of care with providers.     Nutrition Diagnosis Status:   New

## 2020-11-25 NOTE — DISCHARGE SUMMARY
Discharge Summary  Hospital Medicine    Attending Provider on Discharge: Damien Reed     Discharging Team: [facility]     Date of Admission:  11/21/2020         Date of Discharge:  11/24/2020      Diagnoses:     Principal Problem(s):   Urinary tract infection without hematuria     Secondary Problems:  Active Hospital Problems    Diagnosis    *Urinary tract infection without hematuria    Hx of transient ischemic attack (TIA)    (HFpEF) heart failure with preserved ejection fraction    Uncontrolled type 2 diabetes mellitus with stage 3 chronic kidney disease, with long-term current use of insulin    Dermatitis, stasis    Venous insufficiency of leg    Diabetic peripheral neuropathy associated with type 2 diabetes mellitus    Anemia of chronic disease    Morbid obesity with BMI of 40.0-44.9, adult    Weakness    Intractable vomiting with nausea    Stage 3 chronic kidney disease    Essential hypertension    Hyperlipidemia LDL goal <100        Hospital Course:    HPI (See H&P for complete P,F,SHx):  Morbidly obese 75F with T2DM (complicated by polyneuropathy, nephropathy CKDIII-IV borderline, and retinopathy) on long term insulin, essential HTN, mixed HLD, documented history of TIA (on ASA/plavix), and chronic diastolic heart failure here for evaluation of acute cystitis with hematuria. Patient has had approximately 2 days of weakness and nausea with vomiting. Patient reports that she felt fatigued and globally weak last night, vomited once last night and 1 time this morning, and very thirsty with polyuria this morning.  Also reports associated dry cough along with dyspnea on exertion, which is chronic.  Denies fever/chills, chest pain, abdominal pain, diarrhea.      TTE 11/22/2020  · Mild left atrial enlargement.  · There is left ventricular eccentric hypertrophy.  · The left ventricle is normal in size with normal systolic function.   · The estimated ejection fraction is 60%.  · Indeterminate  diastolic function.  · Normal right ventricular size with normal RVSF.  · The estimated PA systolic pressure is 31 mmHg.  · Normal central venous pressure (3 mmHg).     Hospital Course:  Patient admitted with acute cystitis with hematuria and was slow to improve with symptoms. Culture eventually came back as ESBL E. Coli. I did discuss with our infectious disease team informally, not via formal consult, this culture result with recommendation that ertapenem is superior to doxycycline for this condition. PTOT eventually recommended SNF for which patient had new PIV placed to complete 3 days of therapy.     Significant Diagnostic Studies:   Labs:   Recent Labs     11/24/20  0356   WBC 4.25   RBC 3.21*   HGB 9.3*   HCT 29.5*      MCV 92   MCH 29.0   MCHC 31.5*   GRAN 72.7  3.1   LYMPH 15.8*  0.7*   MONO 8.9  0.4   EOS 0.1      Recent Labs     11/24/20  0356   *      K 3.9      CO2 24   BUN 36*   CREATININE 1.7*   CALCIUM 8.6*   ANIONGAP 8   MG 1.9        Microbiology:   Blood Culture, Routine   Date Value Ref Range Status   03/22/2019 No growth after 5 days.  Final   03/22/2019 No growth after 5 days.  Final     Urine Culture, Routine   Date Value Ref Range Status   11/21/2020 ESCHERICHIA COLI ESBL  > 100,000 cfu/ml   (A)  Final     Aerobic Bacterial Culture   Date Value Ref Range Status   05/10/2019   Final    STREPTOCOCCUS GROUP C  Many  Beta-hemolytic streptococci are routinely susceptible to   penicillins,cephalosporins and carbapenems.  Susceptibility testing not routinely performed     05/10/2019 PROTEUS MIRABILIS  Few  Skin lyndsey also present    Final     Anaerobic Culture   Date Value Ref Range Status   05/10/2019 FINEGOLDIA MAGNA  Moderate    Final       Radiology:   Results for orders placed during the hospital encounter of 05/09/19   X-Ray Chest 1 View for PICC_Central line    Narrative EXAMINATION:  XR CHEST 1 VIEW    CLINICAL HISTORY:  Evaluate PICC line  placement;    COMPARISON:  Comparison is made to 03/27/2019.    FINDINGS:  Vascular catheter entering from the right arm has its tip more superior in position than on the examination referenced above, currently lying near the junction of the right and left brachiocephalic veins.  The heart remains modestly enlarged, but the appearance of the cardiomediastinal silhouette and pulmonary vascularity demonstrate no detrimental change since the examination referenced above.  Lung zones are stable, and free of significant airspace consolidation or volume loss.  No pleural fluid of any appreciable volume is seen on either side.  No pneumothorax.      Impression Continued demonstration of cardiomegaly, but there has been no significant detrimental interval change in the appearance of the chest since 03/27/2019.  Vascular placement as above.      Electronically signed by: Jacob Youngblood MD  Date:    05/14/2019  Time:    11:30      Results for orders placed during the hospital encounter of 09/17/15   X-Ray Chest PA And Lateral    Narrative Time of Procedure: 09/17/15 21:31:00  Accession # [id number]    Comparison: 11/16/14    Number of views: 2    Findings:  Lung volumes are normal and symmetric.  Mediastinal structures are midline.    Interval improvement in bilateral pleural effusions with persistent mild blunting of the posterior costophrenic angles.    We detect no convincing evidence of acute focal pulmonary disease, pleural disease, lymph node enlargement or cardiac decompensation and no free air beneath the diaphragm or significant osseous abnormality.    Impression Interval improvement in bilateral pleural effusions with minimal amount of residual dependent pleural fluid.  ______________________________________     Electronically signed by resident: MAXWELL PAINTING MD  Date:     09/18/15  Time:    09:13          As the supervising and teaching physician, I personally reviewed the images and resident's interpretation and I agree  with the findings.        Electronically signed by: Krystyna Love MD  Date:     09/18/15  Time:    09:25       No results found for this or any previous visit.   Results for orders placed during the hospital encounter of 11/16/14   CT Head Without Contrast    Narrative Exam: CT of the head without contrast    Technique: Contiguous 5-mm axial images of the head were obtained without intravenous contrast.  Coronal and sagittal reformats were reviewed.    Comparison:   - none    Findings:     No acute intracranial hemorrhage.  No abnormal parenchymal attenuation to suggest large vessel territory infarction.  The brain demonstrates normal contour and morphology.  The brain parenchyma shows no evidence of mass, abnormal calcifications, or   extra-axial fluid collections.  There is no midline shift or sulcal effacement. The ventricles are normal in size and configuration for the patient's chronological age without evidence of hydrocephalus or distortion by mass effect. The basal cisterns are   patent.  The osseous structures are intact.  The visualized paranasal sinuses and mastoid air cells are well aerated and clear.  The extracalvarial soft tissues demonstrate no significant abnormality.    Impression No acute intracranial abnormality.  Specifically, no acute intracranial hemorrhage.  ______________________________________     Electronically signed by resident: USMAN MALIK MD  Date:     11/16/14  Time:    17:42          As the supervising and teaching physician, I personally reviewed the images and resident's interpretation and I agree with the findings.        Electronically signed by: Dion Burleson MD  Date:     11/16/14  Time:    19:24       Cardiac Studies: 2D ECHO    Significant Treatments/Procedures:   IV abx    Consults while in Hospital:   None    Discharge Medications:    Discharge re-admit orders placed.    Discharge Diet:cardiac diet, diabetic diet: 2000 calorie and fluid restriction: 1500 cc      Activity: activity as tolerated    Discharged Condition: Stable    Discharge Disposition: [facility]    Follow-up:   Future Appointments   Date Time Provider Department Center   12/10/2020  1:30 PM Claudette Juarez NP [facility] WOUND [facility]   12/28/2020  1:30 PM Ame Vasquez MD [facility] IM [facility] PCW     Studies/Results pending on discharge:   None    Damien Reed MD  [facility]

## 2020-11-25 NOTE — PLAN OF CARE
Patient discharged to [facility].    Future Appointments   Date Time Provider Department Center   12/10/2020  1:30 PM Claudette Juarez NP [facility] WOUND [facility]   12/28/2020  1:30 PM Ame Vasquez MD [facility] IM [facility] W        11/24/20 1430   Final Note   Assessment Type Final Discharge Note   Anticipated Discharge Disposition SNF   Right Care Referral Info   Post Acute Recommendation SNF / Sub-Acute Rehab   Referral Type SNF   Facility Name [facility]

## 2020-11-25 NOTE — PT/OT/SLP EVAL
Physical Therapy Evaluation    Patient Name:  Sherin Ortiz   MRN:  [id number]  Admit Date: 11/24/2020  Admitting Diagnosis: Urinary tract infection without hematuria  Recent Surgeries:     General Precautions: Standard, fall, contact   Orthopedic Precautions:N/A   Braces: N/A     Recommendations:     Discharge Recommendations:  home with home health   Level of Assistance Recommended: 24 hours light assistance  Discharge Equipment Recommendations: none   Barriers to discharge: Decreased caregiver support    Assessment:     Sherin Ortiz is a 75 y.o. female admitted with a medical diagnosis of Urinary tract infection without hematuria .  Performance deficits affecting function  weakness, impaired endurance, impaired self care skills, impaired functional mobilty, gait instability, impaired balance, decreased upper extremity function, decreased lower extremity function, impaired skin, edema . Pt is currently MaxA for bed mobility and Mod A with tranfers, SPT using bedside rail    Rehab Potential is good     Activity Tolerance: Good    Plan:     Patient to be seen 5 x/week to address the above listed problems via gait training, therapeutic activities, therapeutic exercises     Plan of Care Expires: 12/25/20  Plan of Care Reviewed with: patient    Subjective     Chief Complaint: Not being (I)  Patient/Family Comments/goals: To return home  Pain/Comfort:  · Pain Rating 1: 0/10  · Pain Rating Post-Intervention 1: 0/10    Living Environment: Pt lives alone in [address]. Uses a walk in shower with  and a shower seat. Pt states she IND transfer from WC to shower seat. Pt is IND with self care. Not working or driving and reports she fell a few months ago.  Prior to admission, patients level of function was Mod I with use of power WC for ambulation and tranfers. Pt does not walk at home. Equipment used at home: bedside commode, power chair, wheelchair, shower chair, raised toilet.  DME owned (not currently used): rolling  walker.  Upon discharge, patient will have assistance from no one, however son lives across street from her and can help if needed.    Patients cultural, spiritual, Worship conflicts given the current situation: no    Objective:     Communicated with pt. prior to session.  Patient found supine with peripheral IV  upon PT entry to room.    Exams:  · Cognitive Exam:  Patient is oriented to Person, Place and Situation  · Sensation:    · -       Intact  · Skin Integrity/Edema:      · -       Skin integrity: Bruising of B l/E's with closed wounds. pt reports that she usually gets both legs wrapped and nursing has not done so since last week.  · RUE ROM: WFL except Shoulder decreased in all ranges  · RUE Strength: WFL except shoulder 2/5 and elbow 3-/5  · LUE ROM: WFL  · LUE Strength: grossly 3+/5  · RLE ROM: WFL  · RLE Strength: grossly 3+/5 except pt with trace MMT ankle  · LLE ROM: WFL  · LLE Strength: grossly 3+/5 except pt with trace MMT ankle    Functional Mobility:  · Bed Mobility:     · Rolling Left:  stand by assistance  · Rolling Right: stand by assistance  · Supine to Sit: stand by assistance  · Sit to Supine: maximal assistance  · Transfers:     · Sit to Stand:  minimum assistance and moderate assistance with pt using BS rail to pull herself into standing  · Bed to Chair: minimum assistance and moderate assistance with  pt using BS rail to pull herself into standing and to do the pivot.  using  Stand Pivot  · Toilet Transfer: minimum assistance and moderate assistance with  BS rail- pt's w/c faced to BS rail and used BS rail to help stand. w/c quickly replaced with BSC under pt. pt required total A for pericare by PCT while PT assisted pt in standing   · Balance: Jett while in standing holding onto BS rail      AM-PAC 6 CLICK MOBILITY  Total Score:12     Patient left supine with call button in reach and and all essentila items in reaching distance..    GOALS:   Multidisciplinary Problems     Physical  Therapy Goals        Problem: Physical Therapy Goal    Goal Priority Disciplines Outcome Goal Variances Interventions   Physical Therapy Goal     PT, PT/OT Ongoing, Progressing     Description: Goals to be met by: 12/9/20    Patient will increase functional independence with mobility by performing:    . Supine to sit with [place]  . Sit to supine with [place]  . Rolling to Left and Right with [place].  . Sit to stand transfer with Supervision  . Bed to chair transfer with Supervision using Rolling Walker  . Gait  x 8-10 feet with Minimal Assistance using Rolling Walker/ or in // bars.   . Wheelchair propulsion x50 feet with Supervision using bilateral uppper extremities  . 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    Benign essential hypertension 11/14/2012    Cataract     s/p phacoemulsification    Chronic kidney disease (CKD), stage III (moderate) 9/12/2013    Diabetic peripheral neuropathy associated with type 2 diabetes mellitus 11/14/2014    causing right hemiparesis    Gait disorder     Hyperlipidemia     Iritis - Both Eyes 6/10/2013    Kidney stone     Lymphedema     Morbid obesity with BMI of 40.0-44.9, adult 2/18/2015    Nephrolithiasis 4/20/2016    NS (nuclear sclerosis) 4/1/2013    Nuclear sclerosis - Both Eyes 4/29/2013    Preseptal cellulitis - Right Eye 4/29/2013    Proliferative diabetic retinopathy - Both Eyes 4/29/2013    Proliferative diabetic retinopathy, both eyes 4/1/2013    PSC (posterior subcapsular cataract) - Both Eyes 4/29/2013    S/P hernia repair 12/19/2012    TIA (transient ischemic attack) 11/18/2014    Tinea pedis 7/24/2012    Tinea pedis is present on both feet.     Type 2 diabetes mellitus with renal manifestations, controlled 12/12/2013    Type 2 diabetes, controlled, with  moderate nonproliferative diabetic retinopathy without macular edema 9/17/2015    Ulcer of left lower extremity, limited to breakdown of skin 7/8/2015    Unspecified cerebral artery occlusion with cerebral infarction 11/16/2014    Unspecified venous (peripheral) insufficiency     Ureteral stone with hydronephrosis 1/27/2016    UTI (lower urinary tract infection)     Vaginal infection     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       Past Surgical History:   Procedure Laterality Date    APPENDECTOMY      CATARACT EXTRACTION W/  INTRAOCULAR LENS IMPLANT Left 5/21/2013    CATARACT EXTRACTION W/  INTRAOCULAR LENS IMPLANT Right 6/4/2013    CHOLECYSTECTOMY      COLONOSCOPY  12/22/2005    normal    ESOPHAGOGASTRODUODENOSCOPY  12/21/2015    hiatal hernia, Schatzki ring    EYE SURGERY Bilateral 2008    laser surgery both eyes    NASAL SEPTUM SURGERY      SUBTOTAL COLECTOMY  12/13/2012    transverse colon, for incarcerated umbilical hernia, Dr. Kat Bower       Time Tracking:     PT Received On: 11/25/20  PT Start Time: 0950     PT Stop Time: 1030  PT Total Time (min): 40 min     Billable Minutes: Evaluation 10mins and Therapeutic Activity 30mins      Penny Hernandez, PT  11/25/2020

## 2020-11-25 NOTE — PLAN OF CARE
11/25/20 1201   Discharge Assessment   Assessment Type Discharge Planning Assessment   Confirmed/corrected address and phone number on facesheet? Yes   Assessment information obtained from? Patient;Caregiver   Expected Length of Stay (days) 14   Communicated expected length of stay with patient/caregiver yes   Prior to hospitilization cognitive status: Alert/Oriented   Prior to hospitalization functional status: Independent   Current cognitive status: Alert/Oriented   Current Functional Status: Needs Assistance   Lives With alone   Able to Return to Prior Arrangements yes   Is patient able to care for self after discharge? Unable to determine at this time (comments)   Patient's perception of discharge disposition home or selfcare   Readmission Within the Last 30 Days no previous admission in last 30 days   Patient currently being followed by outpatient case management? No   Patient currently receives any other outside agency services? No   Equipment Currently Used at Home wheelchair;walker, rolling   Do you have any problems affording any of your prescribed medications? No   Is the patient taking medications as prescribed? yes   Does the patient have transportation home? Yes   Transportation Anticipated family or friend will provide   Does the patient receive services at the [facility]? No   Discharge Plan A Home Health   Discharge Plan B Home with family   DME Needed Upon Discharge  other (see comments)  (TBD)     CM conducted discharge planning assessment at the bedside. Patient lives alone. Has not stairs in the home. States she does not want home health after discharge. CM will continue to follow up and assist with needs as indicated.    Ame Vasquez MD      [id number] - [place] - [address] AT [facility] [address] [place]  [address]  [place] [address]  Phone: [phone] Fax: [phone]    [facility] - [facility] [address] [facility]  [address]  [facility] [address]  Phone: [phone] Fax: [phone]    Payor: [employer] MANAGED MEDICARE / Plan: HUMAN MEDICARE HMO / Product Type: Capitation /     Lizz Hanks RN Case Manager  [facility]

## 2020-11-25 NOTE — PROGRESS NOTES
"Wound care consulted for intertrigo to skin folds and BLE swelling/blisters  The chart was reviewed and outpatient wound care notes.    Recommendations  Miconazole ointment to skin folds of breasts, abdominal pannus/groin BID after cleansing  BLE- cleasne skin, apply barrier cream, vaseline gazue over wounds/blisters, ABD pads, kerlix and 4" ace wrap to BLE from toes to 2" below knees- change daily.  Patient will be assessed on next wound care consultant visit- scheduled for 11/27/20.    Nursing to continue care, wound care will follow-up  KELSY Ring RN, CWCN  [id number]    "

## 2020-11-25 NOTE — PT/OT/SLP EVAL
Occupational Therapy   Evaluation /Treatment    Name: Sherin Ortiz  MRN: [id number]  Admit Date: 11/24/2020  Admitting Diagnosis:  UTI without Hematuria    General Precautions: Standard, fall, contact   Orthopedic Precautions:N/A   Braces: N/A     Recommendations:     Discharge Recommendations: home with home health  Level of Assistance Recommended: 24 hours significant assistance  Discharge Equipment Recommendations:  none  Barriers to discharge:  Decreased caregiver support    Assessment:     Sherin Ortiz is a 75 y.o. female with a medical diagnosis of UTI without Hematuria.  She presents with performance deficits affecting function: weakness, impaired endurance, impaired self care skills, impaired functional mobilty, gait instability, impaired balance, decreased lower extremity function, decreased upper extremity function, decreased safety awareness, pain, decreased ROM, impaired skin.      Rehab Potential is good    Activity Tolerance: Fair    Plan:     Patient to be seen 5 x/week to address the above listed problems via self-care/home management, therapeutic activities, therapeutic exercises, wheelchair management/training  · Plan of Care Expires: 12/25/20  · Plan of Care Reviewed with: patient    Subjective     Chief Complaint: Pt reporting that she can't stand or walk.  Patient/Family Comments/goals: Pt wants to return to PLOF.    Occupational Profile:  Living Environment: Pt indicating that she lives alone in a single story home with threshold to enter. Pt has walk in shower with shower chair and grab bar and has raised toilet.   Previous level of function: Pt was reportedly Mod (I) with mobility using power W/C and was reportedly performing ADLs without assist.  Equipment Used at Home:  bedside commode, power chair, raised toilet, shower chair, wheelchair  Assistance upon Discharge: Pt indicating that her son who lives across the street and her daughter who lives 5 minutes away are able to assist post  D/C.    Pain/Comfort:  · Pain Rating 1: (Pt unable to rate pain )  · Location - Side 1: Bilateral  · Location - Orientation 1: lower  · Location 1: leg(and feet.)  · Pain Addressed 1: Pre-medicate for activity, Reposition, Distraction  · Pain Rating Post-Intervention 1: (Pt unable to rate pain.)    Patients cultural, spiritual, Pentecostalism conflicts given the current situation: no    Objective:     Communicated with: nurse prior to session.  Patient found up in chair with peripheral IV upon OT entry to room.    Occupational Performance    Bed Mobility:    · Patient completed Rolling/Turning to Left with  stand by assistance  · Patient completed Rolling/Turning to Right with stand by assistance  · Patient completed Scooting with Mod (A) scooting to HOB.  · Patient completed Supine to Sit with contact guard assistance using bed rail  Assist.    Functional Mobility/Transfers:  · Patient completed Sit <> Stand Transfer with maximal assistance and of 2 persons  with  rolling walker   · Patient completed Bed <> Chair Transfer using Slide Board technique with total assistance with slide board    Activities of Daily Living:  · Feeding:  set up only. (A) to open containers.  · Grooming: stand by assistance seated sinkside with set up provided.  · Bathing: maximal assistance with (A) to wash ashley area, and lower body with Pt sponge bathing sitting EOB.  · Upper Body Dressing: minimum assistance donning pullover shirt.  · Lower Body Dressing: total assistance with (A) to don pants and socks. (A) required to stand with RW to manage clothing over her bottom.  · Toileting: total assistance with (A) required with all aspects of toileting.    Cognitive/Visual Perceptual:  Cognitive/Psychosocial Skills:     -       Oriented to: Person, Place and Situation   -       Follows Commands/attention:Follows two-step commands  -       Communication: clear/fluent  -       Memory: No Deficits noted  -       Safety awareness/insight to  disability: intact   -       Mood/Affect/Coping skills/emotional control: Appropriate to situation  Visual/Perceptual:      -Intact .    Physical Exam:  Balance: -       Good functional sitting balance.  Pt needing (A) of 2 to stand with RW.    Postural examination/scapula alignment:    -       Rounded shoulders  Skin integrity: Visible skin intact (B) UEs  Edema:  None noted  Sensation:    -       Intact  Motor Planning: -       WFLS  Dominant hand: -   Right  Upper Extremity Range of Motion:     -       Right Upper Extremity: WFL except shld flexion and abduction with both limited to 0-40 degs AROM with 0-80 degrees with A/AROM with leela end feel and end of range.  -       Left Upper Extremity: WFL  Upper Extremity Strength:    -       Right Upper Extremity: WFL except with Shld Flexion and abduction grossly 2/5  and elbow to distal extremith grossly 4-/5  -       Left Upper Extremity: WFL   Strength:    -       Right Upper Extremity: WFL  -       Left Upper Extremity: WFL  Fine Motor Coordination:    -       Intact    Treatment & Education:  Pt edu on role of OT, POC, safety when performing self care tasks , benefit of performing OOB activity, and safety when performing functional transfers and mobility.  - White board updated  - Self care tasks completed-- as noted above   Education:    Patient left up in chair with call button in reach and nurse notified    AMPA 6 Click ADL:  AMPAC Total Score: 14    GOALS:   Multidisciplinary Problems     Occupational Therapy Goals        Problem: Occupational Therapy Goal    Goal Priority Disciplines Outcome Interventions   Occupational Therapy Goal     OT, PT/OT Ongoing, Progressing    Description: Goals to be met by: 12/15/20     Patient will increase functional independence with ADLs by performing:    Feeding with Modified [place].  UE Dressing with Set-up Assistance.  LE Dressing with Moderate Assistance.  Grooming while seated with Set-up  Assistance.  Toileting from bedside commode with Moderate Assistance for hygiene and clothing management.   Bathing from  sitting at sink with Minimal Assistance.  Supine to sit with Modified [place].  Stand pivot transfers with Moderate Assistance.  Upper extremity exercise program x10 minutes, with supervision.  Caregiver will be educated on,level of assist required to safely perform self care tasks and functional transfers.                     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    Benign essential hypertension 11/14/2012    Cataract     s/p phacoemulsification    Chronic kidney disease (CKD), stage III (moderate) 9/12/2013    Diabetic peripheral neuropathy associated with type 2 diabetes mellitus 11/14/2014    causing right hemiparesis    Gait disorder     Hyperlipidemia     Iritis - Both Eyes 6/10/2013    Kidney stone     Lymphedema     Morbid obesity with BMI of 40.0-44.9, adult 2/18/2015    Nephrolithiasis 4/20/2016    NS (nuclear sclerosis) 4/1/2013    Nuclear sclerosis - Both Eyes 4/29/2013    Preseptal cellulitis - Right Eye 4/29/2013    Proliferative diabetic retinopathy - Both Eyes 4/29/2013    Proliferative diabetic retinopathy, both eyes 4/1/2013    PSC (posterior subcapsular cataract) - Both Eyes 4/29/2013    S/P hernia repair 12/19/2012    TIA (transient ischemic attack) 11/18/2014    Tinea pedis 7/24/2012    Tinea pedis is present on both feet.     Type 2 diabetes mellitus with renal manifestations, controlled 12/12/2013    Type 2 diabetes, controlled, with moderate nonproliferative diabetic retinopathy without macular edema 9/17/2015    Ulcer of left lower extremity, limited to breakdown of skin 7/8/2015    Unspecified cerebral artery occlusion with cerebral infarction 11/16/2014    Unspecified venous (peripheral) insufficiency     Ureteral stone with hydronephrosis 1/27/2016    UTI (lower  urinary tract infection)     Vaginal infection     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       Past Surgical History:   Procedure Laterality Date    APPENDECTOMY      CATARACT EXTRACTION W/  INTRAOCULAR LENS IMPLANT Left 5/21/2013    CATARACT EXTRACTION W/  INTRAOCULAR LENS IMPLANT Right 6/4/2013    CHOLECYSTECTOMY      COLONOSCOPY  12/22/2005    normal    ESOPHAGOGASTRODUODENOSCOPY  12/21/2015    hiatal hernia, Schatzki ring    EYE SURGERY Bilateral 2008    laser surgery both eyes    NASAL SEPTUM SURGERY      SUBTOTAL COLECTOMY  12/13/2012    transverse colon, for incarcerated umbilical hernia, Dr. Kat Bower       Time Tracking:     OT Date of Treatment: 11/25/20  OT Start Time: 0930  OT Stop Time: 1030  OT Total Time (min): 60 min    Billable Minutes:Evaluation 15  Self Care/Home Management 45    Kong Anderson, OTR/L  11/25/2020

## 2020-11-25 NOTE — PROGRESS NOTES
[facility]                                  Skilled Nursing Facility                   Progress Note     Admit Date: 11/24/2020  GABE TBD  Principal Problem:  Urinary tract infection without hematuria   HPI obtained from patient interview and chart review     Chief Complaint: Establish Care/ Initial Visit     HPI:   Mrs. Ortiz is a 75 year old female PMHx of T2DM, polyneuropathy, nephropathy, retinopathy, CKDIII-IV, essential HTN, mixed HLD, history of TIA, and chronic diastolic heart failure who presents to SNF following hospitalization for E coli UTI.  Admission to SNF for secondary weakness and debility.    Patient originally presented to [facility] ED on 11/21 with complaints of weakness, nausea and vomiting.  Also reported associated dry cough along with dyspnea on exertion, which is chronic. Pt found to have E. Coli UTI.     Today, patient denies dysuria.  Patient does appear fluid overloaded, will resume home Bumex.  Patient endorses chronic cough that is at baseline.  Patient states that she is at baseline bed-bound an utilizes a motorized wheelchair around her home.    Patient will be treated at [facility] with PT and OT to improve functional status and ability to perform ADLs.     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), Benign essential hypertension (11/14/2012), Cataract, Chronic kidney disease (CKD), stage III (moderate) (9/12/2013), Diabetic peripheral neuropathy associated with type 2 diabetes mellitus (11/14/2014), Gait disorder, Hyperlipidemia, Iritis - Both Eyes (6/10/2013), Kidney stone, Lymphedema, Morbid obesity with BMI of 40.0-44.9, adult (2/18/2015), Nephrolithiasis (4/20/2016), NS (nuclear sclerosis) (4/1/2013), Nuclear sclerosis - Both Eyes (4/29/2013), Preseptal cellulitis - Right Eye (4/29/2013), Proliferative diabetic retinopathy - Both Eyes  (4/29/2013), Proliferative diabetic retinopathy, both eyes (4/1/2013), PSC (posterior subcapsular cataract) - Both Eyes (4/29/2013), S/P hernia repair (12/19/2012), TIA (transient ischemic attack) (11/18/2014), Tinea pedis (7/24/2012), Type 2 diabetes mellitus with renal manifestations, controlled (12/12/2013), Type 2 diabetes, controlled, with moderate nonproliferative diabetic retinopathy without macular edema (9/17/2015), Ulcer of left lower extremity, limited to breakdown of skin (7/8/2015), Unspecified cerebral artery occlusion with cerebral infarction (11/16/2014), Unspecified venous (peripheral) insufficiency, Ureteral stone with hydronephrosis (1/27/2016), UTI (lower urinary tract infection), Vaginal infection, and 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    Past Surgical History: Patient has a past surgical history that includes Appendectomy; Cholecystectomy; Subtotal colectomy (12/13/2012); Eye surgery (Bilateral, 2008); Cataract extraction w/  intraocular lens implant (Left, 5/21/2013); Cataract extraction w/  intraocular lens implant (Right, 6/4/2013); Colonoscopy (12/22/2005); Esophagogastroduodenoscopy (12/21/2015); and Nasal septum surgery.    Social History: Patient reports that she quit smoking about 38 years ago. Her smoking use included cigarettes. She has a 7.50 pack-year smoking history. She has quit using smokeless tobacco. She reports that she does not drink alcohol or use drugs.    Family History: family history includes Cataracts in her brother and sister; Diabetes in her sister; Heart disease in her brother; Leukemia in her mother.    Allergies: Patient is allergic to penicillins and sulfa (sulfonamide antibiotics).    ROS  Constitutional: Negative for fever   Eyes: Negative for blurred vision, double vision   Respiratory:  + for cough, shortness of breath- at baseline   Cardiovascular: Negative for chest pain, palpitations.  + leg swelling.   Gastrointestinal: Negative for abdominal  pain, constipation, diarrhea, nausea, vomiting.   Genitourinary: Negative for dysuria, frequency   Musculoskeletal:  + generalized weakness. Negative for back pain and myalgias.   Skin: Negative for itching and rash.   Neurological: Negative for dizziness, headaches.   Psychiatric/Behavioral: Negative for depression. The patient is not nervous/anxious.      24 hour Vital Sign Range   Temp:  [98.3 °F (36.8 °C)-98.8 °F (37.1 °C)]   Pulse:  [62-70]   Resp:  [16-18]   BP: (120-177)/(55-62)   SpO2:  [95 %-96 %]     PEx  Constitutional: Patient appears debilitated.  No distress noted  HENT:   Head: Normocephalic and atraumatic.   Eyes: Pupils are equal, round  Neck: Normal range of motion. Neck supple.   Cardiovascular: Normal rate, regular rhythm and normal heart sounds.    Pulmonary/Chest: Effort normal and breath sounds are clear  Abdominal: Soft. Bowel sounds are normal.   Musculoskeletal: Normal range of motion.   Neurological: Alert and oriented to person, place, and time.   Psychiatric: Normal mood and affect. Behavior is normal.   Skin: Skin is warm and dry.  +3 pitting edema to bilateral lower extremities    No results for input(s): GLUCOSE, NA, K, CL, CO2, BUN, CREATININE, MG in the last 24 hours.    Invalid input(s):  CALCIUM    No results for input(s): WBC, RBC, HGB, HCT, PLT, MCV, MCH, MCHC in the last 24 hours.      Assessment and Plan:    E coli ESBL UTI  - continue IV ertapenem 1 g Q 24 hr x3 days given prior resistance to CTX  - currently denies any urinary symptoms    Chronic diastolic heart failure  Essential HTN  - Appears hypovolemic  - initiated home Bumex 2 mg daily     History of TIA  Mixed HLD  - continue aspirin 81 mg daily, Plavix 75 mg daily, atorvastatin 40 mg qHS.     Venous stasis dermatitis b/l  - follows with wound care on an outpatient basis, [facility] wound care nurse chart check patient and adjusted orders, will see patient on Friday 11/27     Uncontrolled T2DM with neuropathy, nephropathy,  and retinopathy  - Accu-Cheks AC/HS, diabetic diet  - home regimen with glimepiride 2 mg daily, Lantus 30-35 units qHS.  - continue detemir 20 units qHS., moderate dose sliding scale insulin    CKDIII  - stable, continue to monitor with twice weekly BMPs, avoid nephrotoxic agents and renally dose medications when appropriate    Anemia of chronic renal disease  - stable, continue to monitor on twice weekly CBCs     Morbid obesity  Debility  - weight loss encouraged, dietary consult and appreciate recommendations    Debility   - Continue with PT/OT for gait training and strengthening and restoration of ADL's   - Encourage mobility, OOB in chair, and early ambulation as appropriate  - Fall precautions   - Monitor for bowel and bladder dysfunction  - Monitor for and prevent skin breakdown and pressure ulcers  - Continue DVT prophylaxis with  heparin 7.5 K q.8 hours subcu       Future Appointments   Date Time Provider Department Center   12/10/2020  1:30 PM Claudette Juarez NP [facility] WOUND [facility]   12/28/2020  1:30 PM Ame Vasquez MD [facility] IM [facility] PCW         I certify that SNF services are required to be given on an inpatient basis because Sherin Ortiz needs for skilled nursing care and/or skilled rehabilitation are required on a daily basis and such services can only practically be provided in a skilled nursing facility setting and are for an ongoing condition for which she received inpatient care in the hospital.     Total time of the visit 68 minutes [id number]   Non physical exam/ non charting time: 49 minutes   Description of non physical exam/non charting time: counseling patient on clinical conditions and therapies provided regarding IV antibiotics for UTI treatment, diabetes medication regimen, holding of diuretic, importance of follow-up appointments and the beginning of discharge planning.  Extensive chart review completed including all consultation notes.  All pertinent laboratory and radiographical  images reviewed.        Yesi Adler NP  Department of Hospital Medicine   [facility]     DOS: 11/25/2020       Patient note was created using MModal Dictation.  Any errors in syntax or even information may not have been identified and edited on initial review prior to signing this note.

## 2020-11-25 NOTE — PLAN OF CARE
Problem: Occupational Therapy Goal  Goal: Occupational Therapy Goal  Description: Goals to be met by: 12/15/20     Patient will increase functional independence with ADLs by performing:    Feeding with Modified [place].  UE Dressing with Set-up Assistance.  LE Dressing with Moderate Assistance.  Grooming while seated with Set-up Assistance.  Toileting from bedside commode with Moderate Assistance for hygiene and clothing management.   Bathing from  sitting at sink with Minimal Assistance.  Supine to sit with Modified [place].  Stand pivot transfers with Moderate Assistance.  Upper extremity exercise program x10 minutes, with supervision.  Caregiver will be educated on,level of assist required to safely perform self care tasks and functional transfers.    Outcome: Ongoing, Progressing

## 2020-11-25 NOTE — CONSULTS
"  [facility] - Skilled Nursing Care  Adult Nutrition  Consult Note    SUMMARY     Recommendations    1. Provide Carb Consistent, low sodium diet.  2. Add Boost Glucose Control TID.  Goals: 1. Pt's intake meals >75% by RD follow up.  Nutrition Goal Status: new  Communication of RD Recs: (POC)    Reason for Assessment    Reason For Assessment: consult  Relevant Medical History: DM2, HTN, HLD, TIA, CHF  Interdisciplinary Rounds: attended  General Information Comments: Pt does not follow any specific diet at home, has h/o uncontrolled DM. Pt noted with poor po intake in hospital admission and reports continued decreased appetite. Pt also noted with venous ulcers on b/l lower extremities. Completed NFPE today: pt with moderate hand wasting and moderate edema to LEs, no further wasting noted. Pt does not meet criteria for malnutrition at this time.  Nutrition Discharge Planning: Discharge on low sodium, carb consistent diet.    Nutrition Risk Screen    Nutrition Risk Screen: no indicators present    Nutrition/Diet History    Patient Reported Diet/Restrictions/Preferences: general  Spiritual, Cultural Beliefs, Restoration Practices, Values that Affect Care: no    Anthropometrics    Temp: 98.3 °F (36.8 °C)  Height Method: Stated  Height: 5' 5" (165.1 cm)  Height (inches): 65 in  Weight Method: Bed Scale  Weight: 131.2 kg (289 lb 3.9 oz)  Weight (lb): 289.25 lb  Ideal Body Weight (IBW), Female: 125 lb  % Ideal Body Weight, Female (lb): 231.4 %  BMI (Calculated): 48.1       Lab/Procedures/Meds    Pertinent Labs Reviewed: reviewed  Pertinent Labs Comments: A1c 9.1%, BUN 36, eGFR 28.7, Cr 1.7, Glu 132, Alk phos 195  Pertinent Medications Reviewed: reviewed  Pertinent Medications Comments: statin, detemir    Estimated/Assessed Needs    Weight Used For Calorie Calculations: 131.2 kg (289 lb 3.9 oz)  Energy Calorie Requirements (kcal): 1798 kcal  Energy Need Method: [place](PAL 1.0)  Protein Requirements: 92-118g  Weight Used " For Protein Calculations: 131.2 kg (289 lb 3.9 oz)        RDA Method (mL): 1798  CHO Requirement: 224g      Nutrition Prescription Ordered    Current Diet Order: none    Evaluation of Received Nutrient/Fluid Intake    Comments: LBM 11/23  % Intake of Estimated Energy Needs: 25 - 50 %  % Meal Intake: 25 - 50 %    Nutrition Risk    Level of Risk/Frequency of Follow-up: low     Assessment and Plan  Nutrition Problem  Inadequate oral intake    Related to (etiology):   Decreased appetite    Signs and Symptoms (as evidenced by):   Pt reports poor appetite, decreased intake noted in hospital admission.    Interventions(treatment strategy):  Collaboration of care with providers.    Nutrition Diagnosis Status:   New       Monitor and Evaluation    Food and Nutrient Intake: energy intake, food and beverage intake  Food and Nutrient Adminstration: diet order  Knowledge/Beliefs/Attitudes: food and nutrition knowledge/skill  Anthropometric Measurements: weight, weight change, body mass index  Biochemical Data, Medical Tests and Procedures: electrolyte and renal panel, gastrointestinal profile, glucose/endocrine profile, inflammatory profile, lipid profile  Nutrition-Focused Physical Findings: overall appearance, extremities, muscles and bones, head and eyes, skin     Malnutrition Assessment 11/25/20                 Orbital Region (Subcutaneous Fat Loss): well nourished  Upper Arm Region (Subcutaneous Fat Loss): well nourished  Thoracic and Lumbar Region: well nourished   Religious Region (Muscle Loss): well nourished  Clavicle Bone Region (Muscle Loss): well nourished  Clavicle and Acromion Bone Region (Muscle Loss): well nourished  Scapular Bone Region (Muscle Loss): well nourished  Dorsal Hand (Muscle Loss): moderate depletion  Patellar Region (Muscle Loss): well nourished  Anterior Thigh Region (Muscle Loss): (moderate edema)  Posterior Calf Region (Muscle Loss): (moderate edema)   Edema (Fluid Accumulation): 3-->moderate(lower  extremities)             Nutrition Follow-Up    RD Follow-up?: Yes

## 2020-11-25 NOTE — PLAN OF CARE
Problem: Physical Therapy Goal  Goal: Physical Therapy Goal  Description: Goals to be met by: 12/9/20    Patient will increase functional independence with mobility by performing:    . Supine to sit with [place]  . Sit to supine with [place]  . Rolling to Left and Right with [place].  . Sit to stand transfer with Supervision  . Bed to chair transfer with Supervision using Rolling Walker  . Gait  x 8-10 feet with Minimal Assistance using Rolling Walker/ or in // bars.   . Wheelchair propulsion x50 feet with Supervision using bilateral uppper extremities  . Lower extremity exercise program x20-30 reps per handout, with assistance as needed    Outcome: Ongoing, Progressing

## 2020-11-26 LAB
POCT GLUCOSE: 127 MG/DL (ref 70–110)
POCT GLUCOSE: 50 MG/DL (ref 70–110)
POCT GLUCOSE: 76 MG/DL (ref 70–110)
POCT GLUCOSE: 94 MG/DL (ref 70–110)
SARS-COV-2 RNA RESP QL NAA+PROBE: NOT DETECTED

## 2020-11-26 PROCEDURE — 11000004 HC SNF PRIVATE

## 2020-11-26 PROCEDURE — 25000003 PHARM REV CODE 250: Performed by: HOSPITALIST

## 2020-11-26 PROCEDURE — 25000003 PHARM REV CODE 250: Performed by: NURSE PRACTITIONER

## 2020-11-26 PROCEDURE — 63600175 PHARM REV CODE 636 W HCPCS: Performed by: HOSPITALIST

## 2020-11-26 RX ADMIN — ASPIRIN 81 MG CHEWABLE TABLET 81 MG: 81 TABLET CHEWABLE at 09:11

## 2020-11-26 RX ADMIN — FLUTICASONE PROPIONATE 100 MCG: 50 SPRAY, METERED NASAL at 09:11

## 2020-11-26 RX ADMIN — ATORVASTATIN CALCIUM 40 MG: 20 TABLET, FILM COATED ORAL at 09:11

## 2020-11-26 RX ADMIN — INSULIN DETEMIR 20 UNITS: 100 INJECTION, SOLUTION SUBCUTANEOUS at 09:11

## 2020-11-26 RX ADMIN — HEPARIN SODIUM 7500 UNITS: 5000 INJECTION INTRAVENOUS; SUBCUTANEOUS at 05:11

## 2020-11-26 RX ADMIN — CLOPIDOGREL 75 MG: 75 TABLET, FILM COATED ORAL at 09:11

## 2020-11-26 RX ADMIN — HEPARIN SODIUM 7500 UNITS: 5000 INJECTION INTRAVENOUS; SUBCUTANEOUS at 02:11

## 2020-11-26 RX ADMIN — ERTAPENEM SODIUM 1 G: 1 INJECTION INTRAMUSCULAR; INTRAVENOUS at 02:11

## 2020-11-26 RX ADMIN — MICONAZOLE NITRATE: 20 OINTMENT TOPICAL at 09:11

## 2020-11-26 RX ADMIN — HEPARIN SODIUM 7500 UNITS: 5000 INJECTION INTRAVENOUS; SUBCUTANEOUS at 09:11

## 2020-11-26 RX ADMIN — BUMETANIDE 2 MG: 1 TABLET ORAL at 09:11

## 2020-11-26 NOTE — NURSING
"Pt BS was 50, pt refused glucose tabs. Pt asymptmatic, AAO x4 Pt asked for pudding and apple sauce. Breakfast ate bedsice. Pt was recheck with results of 76. Pt reported, "I will eat my pudding and drink my juice." Charge nurse informed.   "

## 2020-11-27 LAB
POCT GLUCOSE: 152 MG/DL (ref 70–110)
POCT GLUCOSE: 168 MG/DL (ref 70–110)
POCT GLUCOSE: 191 MG/DL (ref 70–110)
POCT GLUCOSE: 196 MG/DL (ref 70–110)
POCT GLUCOSE: 205 MG/DL (ref 70–110)

## 2020-11-27 PROCEDURE — 97530 THERAPEUTIC ACTIVITIES: CPT | Mod: CQ

## 2020-11-27 PROCEDURE — 97535 SELF CARE MNGMENT TRAINING: CPT | Mod: CO

## 2020-11-27 PROCEDURE — 97110 THERAPEUTIC EXERCISES: CPT | Mod: CQ

## 2020-11-27 PROCEDURE — 63600175 PHARM REV CODE 636 W HCPCS: Performed by: HOSPITALIST

## 2020-11-27 PROCEDURE — 25000003 PHARM REV CODE 250: Performed by: HOSPITALIST

## 2020-11-27 PROCEDURE — 97803 MED NUTRITION INDIV SUBSEQ: CPT

## 2020-11-27 PROCEDURE — 11000004 HC SNF PRIVATE

## 2020-11-27 PROCEDURE — 99305 PR NURSING FACILITY CARE, INIT, MOD SEVERITY: ICD-10-PCS | Mod: ,,, | Performed by: HOSPITALIST

## 2020-11-27 PROCEDURE — 97530 THERAPEUTIC ACTIVITIES: CPT | Mod: CO

## 2020-11-27 PROCEDURE — 25000003 PHARM REV CODE 250: Performed by: NURSE PRACTITIONER

## 2020-11-27 PROCEDURE — 99305 1ST NF CARE MODERATE MDM 35: CPT | Mod: ,,, | Performed by: HOSPITALIST

## 2020-11-27 RX ADMIN — ERTAPENEM SODIUM 1 G: 1 INJECTION INTRAMUSCULAR; INTRAVENOUS at 02:11

## 2020-11-27 RX ADMIN — FLUTICASONE PROPIONATE 100 MCG: 50 SPRAY, METERED NASAL at 09:11

## 2020-11-27 RX ADMIN — HEPARIN SODIUM 7500 UNITS: 5000 INJECTION INTRAVENOUS; SUBCUTANEOUS at 09:11

## 2020-11-27 RX ADMIN — BUMETANIDE 2 MG: 1 TABLET ORAL at 09:11

## 2020-11-27 RX ADMIN — MICONAZOLE NITRATE: 20 OINTMENT TOPICAL at 08:11

## 2020-11-27 RX ADMIN — ATORVASTATIN CALCIUM 40 MG: 20 TABLET, FILM COATED ORAL at 08:11

## 2020-11-27 RX ADMIN — HEPARIN SODIUM 7500 UNITS: 5000 INJECTION INTRAVENOUS; SUBCUTANEOUS at 01:11

## 2020-11-27 RX ADMIN — INSULIN ASPART 1 UNITS: 100 INJECTION, SOLUTION INTRAVENOUS; SUBCUTANEOUS at 08:11

## 2020-11-27 RX ADMIN — INSULIN DETEMIR 20 UNITS: 100 INJECTION, SOLUTION SUBCUTANEOUS at 08:11

## 2020-11-27 RX ADMIN — ASPIRIN 81 MG CHEWABLE TABLET 81 MG: 81 TABLET CHEWABLE at 09:11

## 2020-11-27 RX ADMIN — MICONAZOLE NITRATE: 20 OINTMENT TOPICAL at 09:11

## 2020-11-27 RX ADMIN — CLOPIDOGREL 75 MG: 75 TABLET, FILM COATED ORAL at 09:11

## 2020-11-27 RX ADMIN — HEPARIN SODIUM 7500 UNITS: 5000 INJECTION INTRAVENOUS; SUBCUTANEOUS at 05:11

## 2020-11-27 NOTE — PT/OT/SLP PROGRESS
Physical Therapy Treatment    Patient Name:  Sherin Ortiz   MRN:  [id number]  Admit Date: 11/24/2020  Admitting Diagnosis: Urinary tract infection without hematuria    General Precautions: Standard, fall, contact   Orthopedic Precautions:N/A       Recommendations:     Discharge Recommendations:  home with home health   Level of Assistance Recommended at Discharge: 24 hours light assistance  Discharge Equipment Recommendations: none   Barriers to discharge: Decreased caregiver support    Assessment:     Sherin Ortiz is a 75 y.o. female admitted with a medical diagnosis of Urinary tract infection without hematuria . Pt tolerated treatment fairly well, and will continue to benefit from PT services at this time to improve all deficits noted below. Continue with PT POC as inidcated.      Performance deficits affecting function:  weakness, impaired endurance, impaired self care skills, impaired functional mobilty, gait instability, impaired balance, decreased upper extremity function, decreased lower extremity function, impaired skin, edema .    Rehab Potential is good    Activity Tolerance: Good    Plan:     Patient to be seen 5 x/week to address the above listed problems via gait training, therapeutic activities, therapeutic exercises    · Plan of Care Expires: 12/25/20  · Plan of Care Reviewed with: patient    Subjective     Pt was willing to work with therapy.     Pain/Comfort:  · Pain Rating 1: 0/10  · Pain Rating Post-Intervention 1: 0/10    Patient's cultural, spiritual, Zoroastrianism conflicts given the current situation:  · no    Objective:     Communicated with nursing prior to session.  Patient found up in chair upon PT entry to room.     Therapeutic Activities and Exercises:   -BLE therex 3x10 reps with AROM/AAROM: LAQ, Hip flexion,GS  -Mini-elliptical x15 min with assistance    Functional Mobility:  · Transfers: to/from w/c x5 trials minimum assistance and moderate assistance (x2) with pt using BS rail to  pull herself into standing  · Balance: minimum assistance while in standing holding onto BS rail    AM-PAC 6 CLICK MOBILITY  12    Patient left up in chair with call button in reach and nursing notified.    GOALS:   Multidisciplinary Problems     Physical Therapy Goals        Problem: Physical Therapy Goal    Goal Priority Disciplines Outcome Goal Variances Interventions   Physical Therapy Goal     PT, PT/OT Ongoing, Progressing     Description: Goals to be met by: 12/9/20    Patient will increase functional independence with mobility by performing:    . Supine to sit with [place]  . Sit to supine with [place]  . Rolling to Left and Right with [place].  . Sit to stand transfer with Supervision  . Bed to chair transfer with Supervision using Rolling Walker  . Gait  x 8-10 feet with Minimal Assistance using Rolling Walker/ or in // bars.   . Wheelchair propulsion x50 feet with Supervision using bilateral uppper extremities  . Lower extremity exercise program x20-30 reps per handout, with assistance as needed                     Time Tracking:     PT Received On: 11/27/20  PT Total Time (min):   38 minutes    Billable Minutes: Therapeutic Activity 23 and Therapeutic Exercise 15    Treatment Type: Treatment  PT/PTA: PTA     PTA Visit Number: 1     Gretta Loera, BALDOMERO  11/27/2020

## 2020-11-27 NOTE — PLAN OF CARE
Recommendations  1. Add Chava pack BID x 14 days.  2. Continue current Diabetic-2000 calorie diet.   3. Encourage good PO intake.   Goals: Pt's intake meals >75% by RD follow up.  Nutrition Goal Status: goal

## 2020-11-27 NOTE — PROGRESS NOTES
"[facility] - Skilled Nursing Care  Adult Nutrition  Progress Note    SUMMARY       Recommendations  1. Add Chava pack BID x 14 days.  2. Continue current Diabetic-2000 calorie diet.   3. Encourage good PO intake.   Goals: Pt's intake meals >75% by RD follow up.  Nutrition Goal Status: goal met  Communication of RD Recs: (POC)    Reason for Assessment  Reason For Assessment: consult  Relevant Medical History: DM2, HTN, HLD, TIA, CHF  General Information Comments:Pt with good appetite and intake consuming an average of 75%. Denies any issues. Pt appears nourished.   Nutrition Discharge Planning: Adequate PO intake via Diabetic     Nutrition Risk Screen  Nutrition Risk Screen: no indicators present    Nutrition/Diet History  Patient Reported Diet/Restrictions/Preferences: general  Spiritual, Cultural Beliefs, Restorationist Practices, Values that Affect Care: no    Anthropometrics  Temp: 97.8 °F (36.6 °C)  Height Method: Stated  Height: 5' 5" (165.1 cm)  Height (inches): 65 in  Weight Method: Bed Scale  Weight: 131.2 kg (289 lb 3.9 oz)  Weight (lb): 289.25 lb  Ideal Body Weight (IBW), Female: 125 lb  % Ideal Body Weight, Female (lb): 231.4 %  BMI (Calculated): 48.1     Lab/Procedures/Meds  Pertinent Labs Reviewed: reviewed  Pertinent Labs Comments: BUN 36, Crea 1.7, GFR 33.1, Glu 132, Jimmie 8.6, Alb 2.0, A1C  Pertinent Medications Reviewed: reviewed  Pertinent Medications Comments: statin, insulin, heparin    Estimated/Assessed Needs  Weight Used For Calorie Calculations: 131.2 kg (289 lb 3.9 oz)  Energy Calorie Requirements (kcal): 1798 kcal  Energy Need Method: [place](PAL 1.0)  Protein Requirements: 92-118g  Weight Used For Protein Calculations: 131.2 kg (289 lb 3.9 oz)  RDA Method (mL): 1798  CHO Requirement: 224g    Nutrition Prescription Ordered  Current Diet Order: Diabetic 2000    Evaluation of Received Nutrient/Fluid Intake  Comments: LBM 11/26  % Intake of Estimated Energy Needs: 50 - 75 %  % Meal Intake: 50 - " 75 %%    Nutrition Risk  Level of Risk/Frequency of Follow-up: low     Assessment and Plan  Nutrition Problem  Inadequate oral intake     Related to (etiology):   Decreased appetite     Signs and Symptoms (as evidenced by):   Pt reports poor appetite, decreased intake noted in hospital admission.     Interventions(treatment strategy):  Collaboration of care with providers.     Nutrition Diagnosis Status:   Resolved    Monitor and Evaluation  Food and Nutrient Intake: energy intake, food and beverage intake  Food and Nutrient Adminstration: diet order  Knowledge/Beliefs/Attitudes: food and nutrition knowledge/skill  Anthropometric Measurements: weight, weight change, body mass index  Biochemical Data, Medical Tests and Procedures: electrolyte and renal panel, gastrointestinal profile, glucose/endocrine profile, inflammatory profile, lipid profile  Nutrition-Focused Physical Findings: overall appearance, extremities, muscles and bones, head and eyes, skin     Malnutrition Assessment   Orbital Region (Subcutaneous Fat Loss): well nourished  Upper Arm Region (Subcutaneous Fat Loss): well nourished  Thoracic and Lumbar Region: well nourished   [place] Region (Muscle Loss): well nourished  Clavicle Bone Region (Muscle Loss): well nourished  Clavicle and Acromion Bone Region (Muscle Loss): well nourished  Scapular Bone Region (Muscle Loss): well nourished  Dorsal Hand (Muscle Loss): moderate depletion  Patellar Region (Muscle Loss): well nourished  Anterior Thigh Region (Muscle Loss): (moderate edema)  Posterior Calf Region (Muscle Loss): (moderate edema)   Edema (Fluid Accumulation): 3-->moderate(lower extremities)     Nutrition Follow-Up  RD Follow-up?: Yes

## 2020-11-27 NOTE — PT/OT/SLP PROGRESS
"Occupational Therapy   Treatment    Name: Sherin Ortiz  MRN: [id number]  Admit Date: 11/24/2020  Admitting Diagnosis:  Urinary tract infection without hematuria    General Precautions: Standard, fall, contact   Orthopedic Precautions:N/A   Braces:       Recommendations:     Discharge Recommendations: home with home health  Level of Assistance Recommended at Discharge: 24 hours of significant assistance  Discharge Equipment Recommendations:  none  Barriers to discharge:  Decreased caregiver support    Assessment:     Sherin Ortiz is a 75 y.o. female with a medical diagnosis of Urinary tract infection without hematuria She presents with . Performance deficits affecting function are weakness, impaired endurance, impaired self care skills, impaired functional mobilty, gait instability, impaired balance, decreased upper extremity function, decreased ROM, decreased lower extremity function. Pt. participated well with session on this day. Pt. Cooperative with therapy and tolerated treatment well. Pt. Will continue to benefit from continued OT to progress towards goals.     Rehab Potential is good    Activity tolerance:  Fair    Plan:     Patient to be seen 5 x/week to address the above listed problems via self-care/home management, therapeutic activities, therapeutic exercises, wheelchair management/training    · Plan of Care Expires: 12/25/20  · Plan of Care Reviewed with: patient    Subjective     Communicated with: Nurse prior to session. " Good morning'.    Pain/Comfort:  · Pain Rating 1: 10/10  · Location - Side 1: Bilateral  · Location - Orientation 1: lateral  · Location 1: leg  · Pain Addressed 1: Pre-medicate for activity, Distraction  · Pain Rating Post-Intervention 1: 2/10    Patient's cultural, spiritual, Protestant conflicts given the current situation:  · no    Objective:     Patient found supine with   upon OT entry to room.    Bed Mobility:    · Patient completed Rolling/Turning to Left with minimal " assistance  · Patient completed Rolling/Turning to Right with minimal assistance  · Pt scooted with Mod (A) scooting to HOB.  · Patient completed Supine to Sit with minimal assistance while utilizing bed rail assist.    Functional Mobility/Transfers:  · Patient completed Bed <> Chair Transfer using slide board technique with maximal assistance  x2 person.  · Pt scooted in WC with moderate assistance.  ·   · Functional Mobility:   · Not tested    Activities of Daily Living:  · Grooming: stand by assistance seated at sink level with set provided.  · Upper Body Dressing: minimum assistance to doff/shana pull over shirt/gown while seated.  · Lower Body Dressing: total assistance to shana pants while lying supine in bed.  · Toileting: total assistance with hygiene and diaper management.    [facility] 6 Click ADL: 14    Treatment & Education:  Pt educated on safety awareness with transfers and bed mobilities with rolling L to R with use of bed rails.    Patient left up in chair with all lines intact and call button in reachEducation:      GOALS:   Multidisciplinary Problems     Occupational Therapy Goals        Problem: Occupational Therapy Goal    Goal Priority Disciplines Outcome Interventions   Occupational Therapy Goal     OT, PT/OT Ongoing, Progressing    Description: Goals to be met by: 12/15/20     Patient will increase functional independence with ADLs by performing:    Feeding with Modified [place].  UE Dressing with Set-up Assistance.  LE Dressing with Moderate Assistance.  Grooming while seated with Set-up Assistance.  Toileting from bedside commode with Moderate Assistance for hygiene and clothing management.   Bathing from  sitting at sink with Minimal Assistance.  Supine to sit with Modified [place].  Stand pivot transfers with Moderate Assistance.  Upper extremity exercise program x10 minutes, with supervision.  Caregiver will be educated on,level of assist required to safely perform self care tasks and  functional transfers.                     Time Tracking:     OT Date of Treatment: OT Date of Treatment: 11/27/20  OT Total Time (min):      Billable Minutes:Self Care/Home Management 25  Therapeutic Activity 15    ZELDA Garcia  11/27/2020   OT and SANDOVAL have discussed the above patient goals and status in collaboration with Plan of Care

## 2020-11-28 LAB
POCT GLUCOSE: 203 MG/DL (ref 70–110)
POCT GLUCOSE: 205 MG/DL (ref 70–110)
POCT GLUCOSE: 212 MG/DL (ref 70–110)
POCT GLUCOSE: 39 MG/DL (ref 70–110)
POCT GLUCOSE: 43 MG/DL (ref 70–110)
POCT GLUCOSE: 80 MG/DL (ref 70–110)

## 2020-11-28 PROCEDURE — 97535 SELF CARE MNGMENT TRAINING: CPT

## 2020-11-28 PROCEDURE — 25000003 PHARM REV CODE 250: Performed by: HOSPITALIST

## 2020-11-28 PROCEDURE — 63600175 PHARM REV CODE 636 W HCPCS: Performed by: HOSPITALIST

## 2020-11-28 PROCEDURE — 11000004 HC SNF PRIVATE

## 2020-11-28 PROCEDURE — 25000003 PHARM REV CODE 250: Performed by: NURSE PRACTITIONER

## 2020-11-28 PROCEDURE — 97530 THERAPEUTIC ACTIVITIES: CPT

## 2020-11-28 PROCEDURE — 25000003 PHARM REV CODE 250

## 2020-11-28 RX ORDER — IBUPROFEN 200 MG
TABLET ORAL
Status: COMPLETED
Start: 2020-11-28 | End: 2020-11-28

## 2020-11-28 RX ADMIN — CLOPIDOGREL 75 MG: 75 TABLET, FILM COATED ORAL at 08:11

## 2020-11-28 RX ADMIN — BUMETANIDE 2 MG: 1 TABLET ORAL at 08:11

## 2020-11-28 RX ADMIN — Medication 16 G: at 07:11

## 2020-11-28 RX ADMIN — INSULIN ASPART 4 UNITS: 100 INJECTION, SOLUTION INTRAVENOUS; SUBCUTANEOUS at 05:11

## 2020-11-28 RX ADMIN — MICONAZOLE NITRATE: 20 OINTMENT TOPICAL at 08:11

## 2020-11-28 RX ADMIN — FLUTICASONE PROPIONATE 100 MCG: 50 SPRAY, METERED NASAL at 08:11

## 2020-11-28 RX ADMIN — INSULIN ASPART 2 UNITS: 100 INJECTION, SOLUTION INTRAVENOUS; SUBCUTANEOUS at 08:11

## 2020-11-28 RX ADMIN — INSULIN ASPART 4 UNITS: 100 INJECTION, SOLUTION INTRAVENOUS; SUBCUTANEOUS at 12:11

## 2020-11-28 RX ADMIN — ASPIRIN 81 MG CHEWABLE TABLET 81 MG: 81 TABLET CHEWABLE at 08:11

## 2020-11-28 RX ADMIN — HEPARIN SODIUM 7500 UNITS: 5000 INJECTION INTRAVENOUS; SUBCUTANEOUS at 09:11

## 2020-11-28 RX ADMIN — ATORVASTATIN CALCIUM 40 MG: 20 TABLET, FILM COATED ORAL at 08:11

## 2020-11-28 RX ADMIN — HEPARIN SODIUM 7500 UNITS: 5000 INJECTION INTRAVENOUS; SUBCUTANEOUS at 05:11

## 2020-11-28 RX ADMIN — HEPARIN SODIUM 7500 UNITS: 5000 INJECTION INTRAVENOUS; SUBCUTANEOUS at 02:11

## 2020-11-28 RX ADMIN — INSULIN DETEMIR 20 UNITS: 100 INJECTION, SOLUTION SUBCUTANEOUS at 08:11

## 2020-11-28 NOTE — PLAN OF CARE
Problem: Occupational Therapy Goal  Goal: Occupational Therapy Goal  Description: Goals to be met by: 12/15/20     Patient will increase functional independence with ADLs by performing:    Feeding with Modified [place].  UE Dressing with Set-up Assistance.  LE Dressing with Moderate Assistance.  Grooming while seated with Set-up Assistance.  Toileting from bedside commode with Moderate Assistance for hygiene and clothing management.   Bathing from  sitting at sink with Minimal Assistance.  Supine to sit with Modified [place].  Stand pivot transfers with Moderate Assistance.  Upper extremity exercise program x10 minutes, with supervision.  Caregiver will be educated on,level of assist required to safely perform self care tasks and functional transfers.    Outcome: Ongoing, Progressing     Pt was agreeable to OT and was noted to make progress towards her goals in therapy.  Pt's goals remain appropriate at this time.  She will continue to benefit from skilled OT services in order to assist her with increasing her safety and level of independence with self care and mobility tasks.     Sherin Duncan, OT  11/28/2020

## 2020-11-28 NOTE — PT/OT/SLP PROGRESS
Occupational Therapy   Treatment    Name: Sherin Otriz  MRN: [id number]  Admit Date: 11/24/2020  Admitting Diagnosis:  Urinary tract infection without hematuria    General Precautions: Standard, fall, contact   Orthopedic Precautions:N/A   Braces:       Recommendations:     Discharge Recommendations: home with home health  Level of Assistance Recommended at Discharge: 24 hours physical assistance for all ADL's and home management tasks  Discharge Equipment Recommendations:  none  Barriers to discharge:  Decreased caregiver support    Assessment:     Sherin Ortiz is a 75 y.o. female with a medical diagnosis of Urinary tract infection without hematuria.  She presents with the following performance deficits affecting function are weakness, impaired endurance, impaired sensation, impaired self care skills, impaired functional mobilty, gait instability, impaired balance, decreased coordination, decreased upper extremity function, decreased lower extremity function, decreased safety awareness, pain, decreased ROM, impaired coordination, impaired skin, edema. Pt was agreeable to OT and was noted to make progress towards her goals in therapy. Pt able to perform standing transfer today with assistance x2 and participated in ADL tasks.   Pt's goals remain appropriate at this time.  She will continue to benefit from skilled OT services in order to assist her with increasing her safety and level of independence with self care and mobility tasks.       Rehab Potential is fair    Activity tolerance:  Fair    Plan:     Patient to be seen 5 x/week to address the above listed problems via self-care/home management, therapeutic activities, therapeutic exercises, wheelchair management/training    · Plan of Care Expires: 12/25/20  · Plan of Care Reviewed with: patient    Subjective     Communicated with: nurse prior to session.   Pt agreeable with OT session - needed some encouragement to get OOB .    Pain/Comfort:  · Pain Rating  1: 4/10  · Location - Side 1: Bilateral  · Location - Orientation 1: lower  · Location 1: leg  · Pain Addressed 1: Pre-medicate for activity, Reposition, Distraction  · Pain Rating Post-Intervention 1: 4/10    Patient's cultural, spiritual, Buddhist conflicts given the current situation:  · no    Objective:     Patient found HOB elevated with (no active lines) upon OT entry to room.    Bed Mobility:    · Patient completed Rolling/Turning to Left with  minimum assistance and with side rail  · Patient completed Rolling/Turning to Right with minimum assistance and with side rail  · Patient completed Scooting/Bridging with maximal assistance  · Patient completed Supine to Sit with moderate assistance and pt able to lower B LEs with increased time - needed mod A to lift trunk     Functional Mobility/Transfers:  · Patient completed Sit <> Stand Transfer with moderate assistance and one person to stand - hands on w/c positioned in front of her when sitting EOB  with      · Patient completed Bed <> Chair Transfer using Step Transfer technique with of 2 persons with wheelchair positioned directly in front of her - OT with min A on her side and tech providing CGA and hand on w/c for stability (to prevent from sliding away)   · Functional Mobility: Pt working on transfer - requires assistance x2 for sliding board in past session - asked pt how she performed transfers prior to hospitalization - pt stated that she hates using a walker and instead, she would place w/c across from her and pull up using arm rests and turn around using bed to assist - OT asked pt to demonstrate how she performs transfers at home- pt had assistance of 2 persons for transfer (mod A of 1 person and CGA of 2nd) as noted above    Activities of Daily Living:  · Grooming: supervision able to brush hair and wash face - needed A to put hair in pony tail  · Bathing: maximal assistance sponge bath  - LB washed at bed level - UB washed at w/c level - needed A  with LB  · Upper Body Dressing: minimum assistance donned/doffed hospital gown - needed A to pull L sleeve up to shoulder due to limited ROM of R shoulder  · Lower Body Dressing: total assistance socks  · Toileting: maximal assistance able to wipe front, but needed A to  wipe buttocks and with brief    [facility] 6 Click ADL: 14      Treatment & Education:  · Pt completed ADLs and func mobility activities for tx session as noted above  · Pt stated that she did not like using RW - pt said last time she was in hospital she was discharged with RW but would no use it at home.  OT asked how she transfers at home and pt able to demonstrate (noted above).  OT encouraged pt to use RW if able to, but pt continues to declare that she prefers to use her wheelchair as her walker to stand from bed.  · Pt c/o pain in LEs which increased when handled - pt able to lower B LEs off of bed without physical assistance   · Whiteboard updated  · Pt educated on role of OT and POC      Patient left up in chair with call button in reachEducation:      GOALS:   Multidisciplinary Problems     Occupational Therapy Goals        Problem: Occupational Therapy Goal    Goal Priority Disciplines Outcome Interventions   Occupational Therapy Goal     OT, PT/OT Ongoing, Progressing    Description: Goals to be met by: 12/15/20     Patient will increase functional independence with ADLs by performing:    Feeding with Modified [place].  UE Dressing with Set-up Assistance.  LE Dressing with Moderate Assistance.  Grooming while seated with Set-up Assistance.  Toileting from bedside commode with Moderate Assistance for hygiene and clothing management.   Bathing from  sitting at sink with Minimal Assistance.  Supine to sit with Modified [place].  Stand pivot transfers with Moderate Assistance.  Upper extremity exercise program x10 minutes, with supervision.  Caregiver will be educated on,level of assist required to safely perform self care tasks and  functional transfers.                     Time Tracking:     OT Date of Treatment: OT Date of Treatment: 11/28/20  OT Total Time (min):      Billable Minutes:Self Care/Home Management 30  Therapeutic Activity 17    Sherin Duncan OT  11/28/2020

## 2020-11-29 LAB
POCT GLUCOSE: 122 MG/DL (ref 70–110)
POCT GLUCOSE: 157 MG/DL (ref 70–110)
POCT GLUCOSE: 215 MG/DL (ref 70–110)
POCT GLUCOSE: 77 MG/DL (ref 70–110)

## 2020-11-29 PROCEDURE — 63600175 PHARM REV CODE 636 W HCPCS: Performed by: HOSPITALIST

## 2020-11-29 PROCEDURE — 25000003 PHARM REV CODE 250: Performed by: HOSPITALIST

## 2020-11-29 PROCEDURE — 11000004 HC SNF PRIVATE

## 2020-11-29 PROCEDURE — 25000003 PHARM REV CODE 250: Performed by: NURSE PRACTITIONER

## 2020-11-29 RX ADMIN — CLOPIDOGREL 75 MG: 75 TABLET, FILM COATED ORAL at 09:11

## 2020-11-29 RX ADMIN — FLUTICASONE PROPIONATE 100 MCG: 50 SPRAY, METERED NASAL at 09:11

## 2020-11-29 RX ADMIN — MICONAZOLE NITRATE: 20 OINTMENT TOPICAL at 09:11

## 2020-11-29 RX ADMIN — BUMETANIDE 2 MG: 1 TABLET ORAL at 09:11

## 2020-11-29 RX ADMIN — ATORVASTATIN CALCIUM 40 MG: 20 TABLET, FILM COATED ORAL at 08:11

## 2020-11-29 RX ADMIN — HEPARIN SODIUM 7500 UNITS: 5000 INJECTION INTRAVENOUS; SUBCUTANEOUS at 02:11

## 2020-11-29 RX ADMIN — HEPARIN SODIUM 7500 UNITS: 5000 INJECTION INTRAVENOUS; SUBCUTANEOUS at 10:11

## 2020-11-29 RX ADMIN — HEPARIN SODIUM 7500 UNITS: 5000 INJECTION INTRAVENOUS; SUBCUTANEOUS at 06:11

## 2020-11-29 RX ADMIN — MICONAZOLE NITRATE: 20 OINTMENT TOPICAL at 08:11

## 2020-11-29 RX ADMIN — ASPIRIN 81 MG CHEWABLE TABLET 81 MG: 81 TABLET CHEWABLE at 09:11

## 2020-11-29 RX ADMIN — INSULIN DETEMIR 20 UNITS: 100 INJECTION, SOLUTION SUBCUTANEOUS at 09:11

## 2020-11-29 RX ADMIN — INSULIN ASPART 2 UNITS: 100 INJECTION, SOLUTION INTRAVENOUS; SUBCUTANEOUS at 05:11

## 2020-11-29 RX ADMIN — INSULIN ASPART 2 UNITS: 100 INJECTION, SOLUTION INTRAVENOUS; SUBCUTANEOUS at 09:11

## 2020-11-29 NOTE — PT/OT/SLP PROGRESS
Physical Therapy      Patient Name:  Sherin Ortiz   MRN:  [id number]    Patient not seen today secondary to Patient unwilling to participate, Pain. Patient educated on importance of participating with therapy, increased time out of bed and JUNIOR throughout the day,pt verbalized understanding. Chart reviewed and pt remains appropriate for PT services at this time. Will follow-up 11/30/20.    Agnes Cooley, PTA     Subjective   Patient ID: Reny is a 12 month old female.    Chief Complaint   Patient presents with   • Well Infant Birth to 23 Months      Pt here for [facility]    HISTORY OF PRESENT ILLNESS:    mother Bring in pt for 12 month old Well child check  Additional concerns:   None     ED/IC/Specialist visits in the past 1 year: NO  Patient Care Team:  Kristen Pena MD as PCP - General       ACTIVE PROBLEMS:  Patient Active Problem List    Diagnosis Date Noted   • Development delay 2019     Priority: Low   • Encounter for routine child health examination without abnormal findings 2018     Priority: Low       PAST MEDICAL HISTORY:  History reviewed and updated.  Patient  has a past medical history of [place] jaundice (2018) and Well child check.    PAST SURGICAL HISTORY:  History reviewed and updated.  Patient  has no past surgical history on file.    FAMILY HISTORY:  History reviewed and updated.  Family History   Problem Relation Age of Onset   • Patient is unaware of any medical problems Mother    • Patient is unaware of any medical problems Father    • Cancer Neg Hx    • Coronary Artery Disease Neg Hx        SOCIAL HISTORY: History reviewed and updated.  Lives at home with Parents and and with sibling(s)  Smokers at home: NO  Pets at home: no  Diet and Nutrition: milk type: enfagrow formula; quantity: 24 ounces/day; juice: no;uses bottle: YES  Fruits and veggies: adequate  Dental: dental visit:  No;  Parent brushes teeth: Yes  Sleep: age appropriate sleep routine evaluated and discussed  Safety: does use rear facing car seat and seat belt  Age appropriate water, sun, insect safety evaluated and discussed  School/: No  Behavior: appropriate for age: Yes  Screen time: 0hours/day  IMMUNIZATIONS: up to date  Immunization History   Administered Date(s) Administered   • DTaP/HIB/IPV 2018, 2018, 2018   • Hep A, ped/adol, 2 dose 2019   • Hep B, adolescent or pediatric  2018, 2018, 2018   • Influenza, injectable, quadrivalent 02/18/2019   • Influenza, injectable, quadrivalent, preservative free, pediatric 2018   • MMR 05/20/2019   • Pneumococcal Conjugate 13 valent 2018, 2018, 2018, 05/20/2019   • Rotavirus - monovalent 2018, 2018, 2018   • Rotavirus - pentavalent 2018, 2018, 2018   • Varicella 05/20/2019       ALLERGIES:  ALLERGIES: no known allergies.    MEDICATIONS:  No current outpatient medications on file.     No current facility-administered medications for this visit.      Denies OTC, herbals or supplements other than what is listed in med list  REVIEW OF SYSTEMS  PER: mother  CONSTITUTIONAL: reports no fever, night sweats, or weight changes  EYES: reports red eyes, strabismus or eye drainage  ENT: reports no ear pain, nasal congestion, sore throat, or trouble swallowing  CARDIO: reports cyanotic episodes, fatigue with eating   RESP: reports no cough or shortness of breath   GASTRO: reports no choking, vomiting, constipation, diarrhea, changes in bowel habits, blood in stools  : reports no difficulty urinating, hematuria   MUSCULOSKELETAL: reports movement of all extremities, no swelling in the extremities  SKIN: reports no rashes  NEURO:  Reports no weakness, tremors, seizures  MOOD: no sudden drastic changes in behavior  ENDO/HEME: reports no easy fatigue, temperature intolerance, easy bruising.   EXCEPT WHAT IS DOCUMENTED IN HPI      SCREENING:    ASQ-3 Screen    Results: All Reassuring/Routine Follow-up   Communication:  Reassuring  Gross Motor:  Reassuring  Fine Motor:  Reassuring  Problem Solving:  Reassuring  Personal-Social:  Reassuring         OBJECTIVE  Physical Exam  Temp 97.8 °F (36.6 °C)   Resp 18   Ht 29.92\" (76 cm)   Wt 9.94 kg (21 lb 14.6 oz)   HC 43 cm (16.93\")   BMI 17.21 kg/m²   BSA 0.44 m²   Head circumference %: 8 %ile (Z= -1.41) based on WHO (Girls, 0-2 years) head  circumference-for-age based on Head Circumference recorded on 5/20/2019.  Weight %: 80 %ile (Z= 0.83) based on WHO (Girls, 0-2 years) weight-for-age data using vitals from 5/20/2019.  Height %: 77 %ile (Z= 0.73) based on WHO (Girls, 0-2 years) Length-for-age data based on Length recorded on 5/20/2019.  BMI %: 72 %ile (Z= 0.58) based on WHO (Girls, 0-2 years) BMI-for-age based on BMI available as of 5/20/2019.  mother present entire visit  General Appearance: well-developed, well-nourished, and no acute distress, playful, active  HEAD: normocephalic, atraumatic, anterior fontanelle open  Eyes: Bilateral eyelids with no abnormalities, edema and no discharge. Conjunctiva non-injected and non-icteric  Pupils: round, equal size, and reactive to light. Red Reflex present. Extraocular Movements intact; cover/uncover test with no strabismus  ENMT: external ears without lesions, auditory canals patent and clear, TM intact without erythema; no external nasal lesions, nares moist and patent; no mouth lesions; oral mucosa moist, Oropharynx with no erythema or exudates   Lymph Nodes: no cervical, axillary or inguinal lymphadenopathy  Neck: supple, FROM, trachea midline, and no masses. No thyroid enlargement  Lungs: no dyspnea, no wheezing, rales,  Rhonchi; breath sounds normal, good air movement, and clear to auscultation  Cardiovascular: normal S1 and S2; regular rate and rhythm, no murmurs,    Abdomen:+ Bowel Sounds; soft, non-distended, no guarding, no masses or tenderness; no hepatomegaly  : Female genitalia without lesions  Musculoskeletal: Bilateral upper and lower extremities grossly with FROM; no extremity edema  Neurologic: Cranial Nerves grossly intact, no tremors; normal tone with grossly normal strength; gait and speech normal  Skin: moist, non-icteric, no rashes, no obvious abnormal moles; hair and fingernails grossly intact    ASSESSMENT/PLAN  Assessment   Problem List Items Addressed This Visit        Other     Encounter for routine child health examination without abnormal findings - Primary    Development delay     12-month ASQ was normal           Other Visit Diagnoses     Need for vaccination        Relevant Orders    HEPATITIS A VACCINE PEDIATRIC / ADOLESCENT 2 DOSE IM (Completed)    MMR VACC, SQ (Completed)    PNEUMOCOCCAL CONJUGATE 13 VALENT VACC(PREVNAR-13) (Completed)    VARICELLA CHICKEN POX LIVES VACC, SQ (VARIVAX) (Completed)          Counseling   Discussed preventative issues including airbags, car seat and seat belt use, healthy nutrition to include fruits, veggies, no junk food, no juice if possible and adequate milk intake. Also advised on smoke detectors, dental care, sunscreens use, limiting TV/electronic time, engaging and actively playing with child and reading books, Reviewed age appropriate development. Advised on choking hazards, fall precautions, household safety, poison control numbers.    Immunizations: per orders.  History of previous adverse reactions to immunizations? no    Plan     Return in about 3 months (around 8/20/2019) for 15 mo [facility].           Electronically signed by: Kristen Pena MD  5/20/2019

## 2020-11-29 NOTE — PLAN OF CARE
Problem: Adult Inpatient Plan of Care  Goal: Plan of Care Review  Outcome: Ongoing, Progressing  Goal: Patient-Specific Goal (Individualization)  Outcome: Ongoing, Progressing  Goal: Absence of Hospital-Acquired Illness or Injury  Outcome: Ongoing, Progressing  Goal: Optimal Comfort and Wellbeing  Outcome: Ongoing, Progressing  Goal: Readiness for Transition of Care  Outcome: Ongoing, Progressing  Goal: Rounds/Family Conference  Outcome: Ongoing, Progressing     Problem: Diabetes Comorbidity  Goal: Blood Glucose Level Within Desired Range  Outcome: Ongoing, Progressing     Problem: Bariatric Environmental Safety  Goal: Safety Maintained with Care  Outcome: Ongoing, Progressing     Problem: Wound  Goal: Optimal Wound Healing  Outcome: Ongoing, Progressing     Problem: Fall Injury Risk  Goal: Absence of Fall and Fall-Related Injury  Outcome: Ongoing, Progressing     Problem: Skin Injury Risk Increased  Goal: Skin Health and Integrity  Outcome: Ongoing, Progressing     Problem: Infection  Goal: Infection Symptom Resolution  Outcome: Ongoing, Progressing     Problem: Oral Intake Inadequate  Goal: Improved Oral Intake  Outcome: Ongoing, Progressing

## 2020-11-30 LAB
ANION GAP SERPL CALC-SCNC: 8 MMOL/L (ref 8–16)
BASOPHILS # BLD AUTO: 0.03 K/UL (ref 0–0.2)
BASOPHILS NFR BLD: 0.7 % (ref 0–1.9)
BUN SERPL-MCNC: 30 MG/DL (ref 8–23)
CALCIUM SERPL-MCNC: 8.8 MG/DL (ref 8.7–10.5)
CHLORIDE SERPL-SCNC: 104 MMOL/L (ref 95–110)
CO2 SERPL-SCNC: 28 MMOL/L (ref 23–29)
CREAT SERPL-MCNC: 1.5 MG/DL (ref 0.5–1.4)
DIFFERENTIAL METHOD: ABNORMAL
EOSINOPHIL # BLD AUTO: 0.1 K/UL (ref 0–0.5)
EOSINOPHIL NFR BLD: 1.2 % (ref 0–8)
ERYTHROCYTE [DISTWIDTH] IN BLOOD BY AUTOMATED COUNT: 13.1 % (ref 11.5–14.5)
EST. GFR  (AFRICAN AMERICAN): 38.5 ML/MIN/1.73 M^2
EST. GFR  (NON AFRICAN AMERICAN): 33.4 ML/MIN/1.73 M^2
GLUCOSE SERPL-MCNC: 125 MG/DL (ref 70–110)
HCT VFR BLD AUTO: 32.4 % (ref 37–48.5)
HGB BLD-MCNC: 10.1 G/DL (ref 12–16)
IMM GRANULOCYTES # BLD AUTO: 0.04 K/UL (ref 0–0.04)
IMM GRANULOCYTES NFR BLD AUTO: 1 % (ref 0–0.5)
LYMPHOCYTES # BLD AUTO: 1.2 K/UL (ref 1–4.8)
LYMPHOCYTES NFR BLD: 27.7 % (ref 18–48)
MAGNESIUM SERPL-MCNC: 1.6 MG/DL (ref 1.6–2.6)
MCH RBC QN AUTO: 28.1 PG (ref 27–31)
MCHC RBC AUTO-ENTMCNC: 31.2 G/DL (ref 32–36)
MCV RBC AUTO: 90 FL (ref 82–98)
MONOCYTES # BLD AUTO: 0.5 K/UL (ref 0.3–1)
MONOCYTES NFR BLD: 11 % (ref 4–15)
NEUTROPHILS # BLD AUTO: 2.5 K/UL (ref 1.8–7.7)
NEUTROPHILS NFR BLD: 58.4 % (ref 38–73)
NRBC BLD-RTO: 0 /100 WBC
PHOSPHATE SERPL-MCNC: 2.9 MG/DL (ref 2.7–4.5)
PLATELET # BLD AUTO: 343 K/UL (ref 150–350)
PMV BLD AUTO: 9.4 FL (ref 9.2–12.9)
POCT GLUCOSE: 113 MG/DL (ref 70–110)
POCT GLUCOSE: 176 MG/DL (ref 70–110)
POCT GLUCOSE: 231 MG/DL (ref 70–110)
POCT GLUCOSE: 370 MG/DL (ref 70–110)
POTASSIUM SERPL-SCNC: 3.9 MMOL/L (ref 3.5–5.1)
RBC # BLD AUTO: 3.6 M/UL (ref 4–5.4)
SODIUM SERPL-SCNC: 140 MMOL/L (ref 136–145)
WBC # BLD AUTO: 4.19 K/UL (ref 3.9–12.7)

## 2020-11-30 PROCEDURE — 25000003 PHARM REV CODE 250: Performed by: HOSPITALIST

## 2020-11-30 PROCEDURE — 63600175 PHARM REV CODE 636 W HCPCS: Performed by: NURSE PRACTITIONER

## 2020-11-30 PROCEDURE — 97530 THERAPEUTIC ACTIVITIES: CPT | Mod: CQ

## 2020-11-30 PROCEDURE — 25000003 PHARM REV CODE 250: Performed by: NURSE PRACTITIONER

## 2020-11-30 PROCEDURE — 83735 ASSAY OF MAGNESIUM: CPT

## 2020-11-30 PROCEDURE — 97535 SELF CARE MNGMENT TRAINING: CPT | Mod: CO

## 2020-11-30 PROCEDURE — 80048 BASIC METABOLIC PNL TOTAL CA: CPT

## 2020-11-30 PROCEDURE — 36415 COLL VENOUS BLD VENIPUNCTURE: CPT

## 2020-11-30 PROCEDURE — 11000004 HC SNF PRIVATE

## 2020-11-30 PROCEDURE — 84100 ASSAY OF PHOSPHORUS: CPT

## 2020-11-30 PROCEDURE — 97110 THERAPEUTIC EXERCISES: CPT | Mod: CO

## 2020-11-30 PROCEDURE — 85025 COMPLETE CBC W/AUTO DIFF WBC: CPT

## 2020-11-30 PROCEDURE — 63600175 PHARM REV CODE 636 W HCPCS: Performed by: HOSPITALIST

## 2020-11-30 RX ORDER — INSULIN ASPART 100 [IU]/ML
0-5 INJECTION, SOLUTION INTRAVENOUS; SUBCUTANEOUS
Status: DISCONTINUED | OUTPATIENT
Start: 2020-11-30 | End: 2020-12-12 | Stop reason: HOSPADM

## 2020-11-30 RX ORDER — IBUPROFEN 200 MG
16 TABLET ORAL
Status: DISCONTINUED | OUTPATIENT
Start: 2020-11-30 | End: 2020-12-12 | Stop reason: HOSPADM

## 2020-11-30 RX ORDER — IBUPROFEN 200 MG
24 TABLET ORAL
Status: DISCONTINUED | OUTPATIENT
Start: 2020-11-30 | End: 2020-12-12 | Stop reason: HOSPADM

## 2020-11-30 RX ORDER — GLUCAGON 1 MG
1 KIT INJECTION
Status: DISCONTINUED | OUTPATIENT
Start: 2020-11-30 | End: 2020-12-12 | Stop reason: HOSPADM

## 2020-11-30 RX ORDER — LANOLIN ALCOHOL/MO/W.PET/CERES
400 CREAM (GRAM) TOPICAL 2 TIMES DAILY
Status: COMPLETED | OUTPATIENT
Start: 2020-11-30 | End: 2020-12-01

## 2020-11-30 RX ADMIN — HEPARIN SODIUM 7500 UNITS: 5000 INJECTION INTRAVENOUS; SUBCUTANEOUS at 01:11

## 2020-11-30 RX ADMIN — HEPARIN SODIUM 7500 UNITS: 5000 INJECTION INTRAVENOUS; SUBCUTANEOUS at 06:11

## 2020-11-30 RX ADMIN — MICONAZOLE NITRATE: 20 OINTMENT TOPICAL at 09:11

## 2020-11-30 RX ADMIN — FLUTICASONE PROPIONATE 100 MCG: 50 SPRAY, METERED NASAL at 09:11

## 2020-11-30 RX ADMIN — ATORVASTATIN CALCIUM 40 MG: 20 TABLET, FILM COATED ORAL at 09:11

## 2020-11-30 RX ADMIN — CLOPIDOGREL 75 MG: 75 TABLET, FILM COATED ORAL at 09:11

## 2020-11-30 RX ADMIN — Medication 400 MG: at 12:11

## 2020-11-30 RX ADMIN — INSULIN ASPART 5 UNITS: 100 INJECTION, SOLUTION INTRAVENOUS; SUBCUTANEOUS at 05:11

## 2020-11-30 RX ADMIN — HEPARIN SODIUM 7500 UNITS: 5000 INJECTION INTRAVENOUS; SUBCUTANEOUS at 09:11

## 2020-11-30 RX ADMIN — INSULIN ASPART 1 UNITS: 100 INJECTION, SOLUTION INTRAVENOUS; SUBCUTANEOUS at 09:11

## 2020-11-30 RX ADMIN — ASPIRIN 81 MG CHEWABLE TABLET 81 MG: 81 TABLET CHEWABLE at 09:11

## 2020-11-30 RX ADMIN — Medication 400 MG: at 09:11

## 2020-11-30 RX ADMIN — BUMETANIDE 2 MG: 1 TABLET ORAL at 09:11

## 2020-11-30 NOTE — PROGRESS NOTES
[facility]                                  Skilled Nursing Facility                   Progress Note     Admit Date: 11/24/2020  GABE 12/15/2020  Principal Problem:  Urinary tract infection without hematuria   HPI obtained from patient interview and chart review     Chief Complaint: Re-evaluation of medical treatment and therapy status: Lab review, evaluation of renal function, hypomagnesemia    HPI:   Mrs. Ortiz is a 75 year old female PMHx of T2DM, polyneuropathy, nephropathy, retinopathy, CKDIII-IV, essential HTN, mixed HLD, history of TIA, and chronic diastolic heart failure who presents to SNF following hospitalization for E coli UTI.  Admission to SNF for secondary weakness and debility.    Interval history: All of today's labs reviewed and are listed below.  BUN/creatinine 30/1.5 from 36/1.7, Mag 1.6.  24 hr blood glucose range is 113-215.  24 hr vital sign ranges listed below.  Patient denies trouble sleeping at night, shortness of breath, abdominal discomfort, nausea, or vomiting.  Patient reports an adequate appetite.  Patient denies dysuria.  Patient reports having regular bowel movements.  Patient progessing slowly with PT/OT- refused PT on 11/29, patient does not ambulate at baseline. Continuing to follow and treat all acute and chronic conditions.    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), Benign essential hypertension (11/14/2012), Cataract, Chronic kidney disease (CKD), stage III (moderate) (9/12/2013), Diabetic peripheral neuropathy associated with type 2 diabetes mellitus (11/14/2014), Gait disorder, Hyperlipidemia, Iritis - Both Eyes (6/10/2013), Kidney stone, Lymphedema, Morbid obesity with BMI of 40.0-44.9, adult (2/18/2015), Nephrolithiasis (4/20/2016), NS (nuclear sclerosis) (4/1/2013), Nuclear sclerosis - Both Eyes (4/29/2013), Preseptal  cellulitis - Right Eye (4/29/2013), Proliferative diabetic retinopathy - Both Eyes (4/29/2013), Proliferative diabetic retinopathy, both eyes (4/1/2013), PSC (posterior subcapsular cataract) - Both Eyes (4/29/2013), S/P hernia repair (12/19/2012), TIA (transient ischemic attack) (11/18/2014), Tinea pedis (7/24/2012), Type 2 diabetes mellitus with renal manifestations, controlled (12/12/2013), Type 2 diabetes, controlled, with moderate nonproliferative diabetic retinopathy without macular edema (9/17/2015), Ulcer of left lower extremity, limited to breakdown of skin (7/8/2015), Unspecified cerebral artery occlusion with cerebral infarction (11/16/2014), Unspecified venous (peripheral) insufficiency, Ureteral stone with hydronephrosis (1/27/2016), UTI (lower urinary tract infection), Vaginal infection, and 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    Past Surgical History: Patient has a past surgical history that includes Appendectomy; Cholecystectomy; Subtotal colectomy (12/13/2012); Eye surgery (Bilateral, 2008); Cataract extraction w/  intraocular lens implant (Left, 5/21/2013); Cataract extraction w/  intraocular lens implant (Right, 6/4/2013); Colonoscopy (12/22/2005); Esophagogastroduodenoscopy (12/21/2015); and Nasal septum surgery.    Social History: Patient reports that she quit smoking about 38 years ago. Her smoking use included cigarettes. She has a 7.50 pack-year smoking history. She has quit using smokeless tobacco. She reports that she does not drink alcohol or use drugs.    Family History: family history includes Cataracts in her brother and sister; Diabetes in her sister; Heart disease in her brother; Leukemia in her mother.    Allergies: Patient is allergic to penicillins and sulfa (sulfonamide antibiotics).    ROS  Constitutional: Negative for fever   Eyes: Negative for blurred vision, double vision   Respiratory:  + for cough, shortness of breath- at baseline   Cardiovascular: Negative for  chest pain, palpitations.  + leg swelling.   Gastrointestinal: Negative for abdominal pain, constipation, diarrhea, nausea, vomiting.   Genitourinary: Negative for dysuria, frequency   Musculoskeletal:  + generalized weakness. Negative for back pain and myalgias.   Skin: Negative for itching and rash.   Neurological: Negative for dizziness, headaches.   Psychiatric/Behavioral: Negative for depression. The patient is not nervous/anxious.      24 hour Vital Sign Range   Temp:  [97.1 °F (36.2 °C)-97.6 °F (36.4 °C)]   Pulse:  [64-69]   Resp:  [16-18]   BP: (140-156)/(63-69)   SpO2:  [96 %]     PEx  Constitutional: Patient appears debilitated.  No distress noted  HENT:   Head: Normocephalic and atraumatic.   Eyes: Pupils are equal, round  Neck: Normal range of motion. Neck supple.   Cardiovascular: Normal rate, regular rhythm and normal heart sounds.    Pulmonary/Chest: Effort normal and breath sounds are clear  Abdominal: Soft. Bowel sounds are normal.   Musculoskeletal: Normal range of motion.   Neurological: Alert and oriented to person, place, and time.   Psychiatric: Normal mood and affect. Behavior is normal.   Skin: Skin is warm and dry.  +3 pitting edema to bilateral lower extremities    Recent Labs   Lab 11/30/20  0631      K 3.9      CO2 28   BUN 30*   CREATININE 1.5*   MG 1.6       Recent Labs   Lab 11/30/20  0631   WBC 4.19   RBC 3.60*   HGB 10.1*   HCT 32.4*      MCV 90   MCH 28.1   MCHC 31.2*         Assessment and Plan:     Problems addressed today      Uncontrolled T2DM with neuropathy, nephropathy, and retinopathy  - Accu-Cheks AC/HS, diabetic diet  - home regimen with glimepiride 2 mg daily, Lantus 30-35 units qHS.  - unstable, BGs with peaks and valleys- changed detemir to 12 units in a.m. and 10 units in the p.m.,, decreased to low- dose sliding scale insulin     Hypomagnesemia  - initiated magnesium oxide 400 mg BID x2 days    E coli ESBL UTI  - completed 3 day treatment of IV  ertapenem 1g Q 24 hr   - currently denies any urinary symptoms  - call infection control to see if patient can be cleared to remove contact isolation, no answer, charge nurse to call back for clearance- infection control states to keep on since patient is incontinent    CKDIII  - improving, continue to monitor with twice weekly BMPs, avoid nephrotoxic agents and renally dose medications when appropriate    Chronic diastolic heart failure  Essential HTN  - stable, continue home Bumex 2 mg daily    Anemia of chronic renal disease  - stable, continue to monitor on twice weekly CBCs    Venous stasis dermatitis b/l  - follows with wound care on an outpatient basis, [facility] wound care nurse chart check patient and adjusted orders, will see patient on Tues 12/1       Ongoing but stable problems      History of TIA  Mixed HLD  - continue aspirin 81 mg daily, Plavix 75 mg daily, atorvastatin 40 mg qHS.    Morbid obesity  Debility  - weight loss encouraged, dietary consult and appreciate recommendations    Debility   - Continue with PT/OT for gait training and strengthening and restoration of ADL's   - Encourage mobility, OOB in chair, and early ambulation as appropriate  - Fall precautions   - Monitor for bowel and bladder dysfunction  - Monitor for and prevent skin breakdown and pressure ulcers  - Continue DVT prophylaxis with  heparin 7.5 K q.8 hours subcu       Future Appointments   Date Time Provider Department Center   12/10/2020  1:30 PM Claudette Juarez NP [facility] WOUND [facility]   12/28/2020  1:30 PM Ame Vasquez MD [facility] IM [facility]       Yesi Adler NP  Department of [facility] Medicine   [facility]     DOS: 11/30/2020       Patient note was created using MModal Dictation.  Any errors in syntax or even information may not have been identified and edited on initial review prior to signing this note.

## 2020-11-30 NOTE — CLINICAL REVIEW
Clinical Pharmacy Chart Review Note      Admit Date: 11/24/2020   LOS: 6 days       Sherin Ortiz is a 75 y.o. female admitted to SNF for PT/OT after hospitalization for urinary tract infection without hematuria.    Active Hospital Problems    Diagnosis  POA    *Urinary tract infection without hematuria [N39.0]  Yes      Resolved Hospital Problems   No resolved problems to display.     Review of patient's allergies indicates:   Allergen Reactions    Penicillins Hives     Other reaction(s): Hives  Patient has received cefdinir, ceftriaxone, cefazolin and cefepime in the past with no documented reactions    Sulfa (sulfonamide antibiotics) Other (See Comments)     Shakes, pt states her doctor told her the shakes were possibly caused by an allergy to sulfa     Patient Active Problem List    Diagnosis Date Noted    Urinary tract infection without hematuria 11/21/2020    Varicose veins of right lower extremity with ulcer other part of lower leg 11/13/2020    Varicose veins of left lower extremity with ulcer limited to breakdown of skin 08/14/2020    Chronic kidney disease, stage III (moderate) 03/09/2020    Lymphedema 07/05/2019    Dermatitis associated with moisture 05/10/2019    Acute gout of left hand 05/09/2019    Left wrist pain 05/09/2019    Goals of care, counseling/discussion 03/23/2019    Leukocytosis 03/22/2019    Hyperglycemia 03/22/2019    Prolonged Q-T interval on ECG 03/22/2019    Hx of transient ischemic attack (TIA) 01/22/2018    (HFpEF) heart failure with preserved ejection fraction 01/22/2018    Right shoulder strain, initial encounter 12/14/2017    Hyperparathyroidism 07/06/2017    Aortic atherosclerosis 07/06/2017    PAOD (peripheral arterial occlusive disease) 07/06/2017    Uncontrolled type 2 diabetes mellitus with stage 3 chronic kidney disease, with long-term current use of insulin 11/08/2016    Dermatitis, stasis 10/19/2016    Venous insufficiency of leg 04/12/2016     Wheelchair dependent 11/29/2015    Diabetic peripheral neuropathy associated with type 2 diabetes mellitus 09/18/2015    Anemia of chronic disease 09/18/2015    Hypoalbuminemia 09/18/2015    Inability to walk 02/18/2015    Morbid obesity with BMI of 40.0-44.9, adult 02/18/2015    Weakness 11/17/2014    Intractable vomiting with nausea 06/26/2014    Dystrophic nail 01/07/2014    Stage 3 chronic kidney disease 09/12/2013    PSC (posterior subcapsular cataract) - Both Eyes 04/29/2013    Nuclear sclerosis - Both Eyes 04/29/2013    Asteroid hyalosis - Left Eye 04/29/2013    Essential hypertension 11/14/2012    Hyperlipidemia LDL goal <100 08/14/2012    Bilateral leg edema 07/24/2012    Tinea pedis 07/24/2012       Scheduled Meds:    aspirin  81 mg Oral Daily    atorvastatin  40 mg Oral QHS    bumetanide  2 mg Oral Daily    clopidogreL  75 mg Oral Daily    fluticasone propionate  2 spray Each Nostril Daily    heparin (porcine)  7,500 Units Subcutaneous Q8H    insulin detemir U-100  20 Units Subcutaneous QHS    miconazole nitrate 2%   Topical (Top) BID     Continuous Infusions:   PRN Meds: acetaminophen, aluminum-magnesium hydroxide-simethicone, dextrose 50%, dextrose 50%, glucagon (human recombinant), glucose, glucose, insulin aspart U-100, ondansetron    OBJECTIVE:     Vital Signs (Last 24H)  Temp:  [97.1 °F (36.2 °C)]   Pulse:  [69]   Resp:  [16]   BP: (156)/(69)   SpO2:  [96 %]     Laboratory:  CBC:   Recent Labs   Lab 11/24/20 0356   WBC 4.25   RBC 3.21*   HGB 9.3*   HCT 29.5*      MCV 92   MCH 29.0   MCHC 31.5*     BMP:   Recent Labs   Lab 11/24/20  0356 11/30/20  0631   * 125*    140   K 3.9 3.9    104   CO2 24 28   BUN 36* 30*   CREATININE 1.7* 1.5*   CALCIUM 8.6* 8.8   MG 1.9 1.6     CMP:   Recent Labs   Lab 11/24/20  0356 11/30/20  0631   * 125*   CALCIUM 8.6* 8.8   ALBUMIN 2.0*  --    PROT 6.3  --     140   K 3.9 3.9   CO2 24 28    104   BUN  36* 30*   CREATININE 1.7* 1.5*   ALKPHOS 195*  --    ALT 9*  --    AST 13  --    BILITOT 0.4  --      LFTs:   Recent Labs   Lab 11/24/20  0356   ALT 9*   AST 13   ALKPHOS 195*   BILITOT 0.4   PROT 6.3   ALBUMIN 2.0*     Lab Results   Component Value Date    HGBA1C 9.1 (H) 11/22/2020         ASSESSMENT/PLAN:     I have reviewed the medications in compliance with CMS Regulation F756 of the [place]. Based on information gathered, the following items may need to be addressed:    **According to PMH and home medication list, patient takes the following medications at home. These medications are not currently ordered at SNF:  · Glimepiride 2 mg daily      Medications reviewed by PharmD, please re-consult if needed.      Gretta Harrison, Pharm. D.  Clinical Pharmacist  [facility]

## 2020-11-30 NOTE — NURSING
BOTH LOWER LEGS DRESSINGS CHANGED AS ORDERED MILD REDNESS WITH TINY DRY SCABS NOTED.NO DRAINAGE NOTED.

## 2020-11-30 NOTE — PT/OT/SLP PROGRESS
"Occupational Therapy   Treatment    Name: Sherin Ortiz  MRN: [id number]  Admit Date: 11/24/2020  Admitting Diagnosis:  Urinary tract infection without hematuria    General Precautions: Standard, fall   Orthopedic Precautions:N/A   Braces: N/A     Recommendations:     Discharge Recommendations: home with home health  Level of Assistance Recommended at Discharge: 24 hours supervision for safety in performing ADL's and home management tasks  Discharge Equipment Recommendations:  none  Barriers to discharge:  Decreased caregiver support    Assessment:     Sherin Ortiz is a 75 y.o. female with a medical diagnosis of Urinary tract infection without hematuria.   She presents with the following performance deficits affecting function are pain, weakness, impaired endurance, impaired self care skills, impaired functional mobilty, impaired balance, decreased ROM, decreased upper extremity function, decreased lower extremity function, decreased safety awareness. Pt was able to perform 2 sit to stands x2 person with RW and rail on EOB to pull up.Pt. participated well with session on this day. Pt. Cooperative with therapy and tolerated treatment well. Pt. Will continue to benefit from continued OT to progress towards goals.     Rehab Potential is fair    Activity tolerance:  Fair    Plan:     Patient to be seen 5 x/week to address the above listed problems via self-care/home management, therapeutic activities, therapeutic exercises, wheelchair management/training    · Plan of Care Expires: 12/25/20  · Plan of Care Reviewed with: patient    Subjective     Communicated with: Nurse prior to session. "Good morning" .    Pain/Comfort:  · Pain Rating 1: 8/10  · Location - Side 1: Bilateral  · Location - Orientation 1: lower  · Location 1: leg  · Pain Rating Post-Intervention 1: 7/10    Patient's cultural, spiritual, Uatsdin conflicts given the current situation:  · no    Objective:     Patient found supine with   upon OT entry " to room.    Bed Mobility:    · Patient completed Rolling/Turning to Left with  moderate assistance  · Patient completed Rolling/Turning to Right with moderate assistance  · Patient completed Scooting/Bridging with with maximal assistance  · Patient completed Supine to Sit with moderate assistance and pt able to lower BLE's with increased time with mod A for trunk.    Functional Mobility/Transfers:  · Patient completed Sit <> Stand Transfer with maximal assistance  with  rolling walker   · Patient completed Bed <> Chair Transfer using Slide Board technique with moderate assistance with rolling walker  · Functional Mobility: Pt working on transfer x2 person for sliding board.Pt stated she did not want to do today as fearful of falling. Pt demonstrated how she would perform tranfers at home by pushing off from bed.    Activities of Daily Living:  · Grooming: minimum assistance to combe hair at EOB while seated  · Bathing: minimum assistance to clean UB and Maximal assitance with BLE   · Upper Body Dressing: moderate assistance to doff/shana gown while seated EOB.  · Lower Body Dressing: total assistance to clean BLE  calves.  · Toileting: maximal assistance able to wipe front, but needed assistamnce to wipe buttocks and shana diaper.    [facility] 6 Click ADL: 14      Treatment & Education:  Pt  Completed ADL's at EOB   Pt was educated on bed mobilites and sit to stands with RW x2 person.      Patient left up in chair with all lines intact and call button in reachEducation:      GOALS:   Multidisciplinary Problems     Occupational Therapy Goals        Problem: Occupational Therapy Goal    Goal Priority Disciplines Outcome Interventions   Occupational Therapy Goal     OT, PT/OT Ongoing, Progressing    Description: Goals to be met by: 12/15/20     Patient will increase functional independence with ADLs by performing:    Feeding with Modified [place].  UE Dressing with Set-up Assistance.  LE Dressing with Moderate  Assistance.  Grooming while seated with Set-up Assistance.  Toileting from bedside commode with Moderate Assistance for hygiene and clothing management.   Bathing from  sitting at sink with Minimal Assistance.  Supine to sit with Modified [place].  Stand pivot transfers with Moderate Assistance.  Upper extremity exercise program x10 minutes, with supervision.  Caregiver will be educated on,level of assist required to safely perform self care tasks and functional transfers.                     Time Tracking:     OT Date of Treatment: OT Date of Treatment: 11/30/20  OT Total Time (min):  43    Billable Minutes:Self Care/Home Management 32  Therapeutic Exercise 11    ZELDA Garcia  11/30/2020   OT and SANDOVAL have discussed the above patient goals and status in collaboration with Plan of Care

## 2020-11-30 NOTE — PT/OT/SLP PROGRESS
"Physical Therapy Treatment    Patient Name:  Sherin Ortiz   MRN:  [id number]  Admit Date: 11/24/2020  Admitting Diagnosis: Urinary tract infection without hematuria    General Precautions: Standard, fall, contact   Orthopedic Precautions:N/A       Recommendations:     Discharge Recommendations:  home with home health   Level of Assistance Recommended at Discharge: 24 hours light assistance  Discharge Equipment Recommendations: none   Barriers to discharge: Decreased caregiver support    Assessment:     Sherin Ortiz is a 75 y.o. female admitted with a medical diagnosis of Urinary tract infection without hematuria . Pt required limited with activity on this date 2/2 BLE pain and fatigue. Pt will continue to benefit from PT services at this time to improve all deficits noted below. Continue with PT POC as indicated.      Performance deficits affecting function:  weakness, impaired endurance, impaired self care skills, impaired functional mobilty, gait instability, impaired balance, decreased upper extremity function, decreased lower extremity function, impaired skin, edema .    Rehab Potential is good    Activity Tolerance:  Fair    Plan:     Patient to be seen 5 x/week to address the above listed problems via gait training, therapeutic activities, therapeutic exercises    · Plan of Care Expires: 12/25/20  · Plan of Care Reviewed with: patient    Subjective     "I want to get back in the bed."    Pain/Comfort:  · Pain Rating 1: 8/10  · Location - Side 1: Bilateral  · Location - Orientation 1: lower  · Location 1: leg  · Pain Addressed 1: Pre-medicate for activity  · Pain Rating Post-Intervention 1: 8/10    Patient's cultural, spiritual, Latter day conflicts given the current situation:  · no    Objective:     Communicated with nursing prior to session.  Patient found up in chair upon PT entry to room.     Therapeutic Activities and Exercises:   -Supine BLE therex 2x10 reps: QS, and GS    Functional " Mobility:  · Bed Mobility:  Sit to Supine: total assistance and of 2 persons  · Transfers:  Sit to Stand:  maximal assistance x2 with use of bed rail  · Bed to Chair: total assistance x2 with  hand-held assist using Stand Pivot  · Balance: Pt stood ~45 seconds w/ use of bed rail and mod A x2    AM-PAC 6 CLICK MOBILITY  12    Patient left supine with call button in reach and nursing notified.    GOALS:   Multidisciplinary Problems     Physical Therapy Goals        Problem: Physical Therapy Goal    Goal Priority Disciplines Outcome Goal Variances Interventions   Physical Therapy Goal     PT, PT/OT Ongoing, Progressing     Description: Goals to be met by: 12/9/20    Patient will increase functional independence with mobility by performing:    . Supine to sit with [place]  . Sit to supine with [place]  . Rolling to Left and Right with [place].  . Sit to stand transfer with Supervision  . Bed to chair transfer with Supervision using Rolling Walker  . Gait  x 8-10 feet with Minimal Assistance using Rolling Walker/ or in // bars.   . Wheelchair propulsion x50 feet with Supervision using bilateral uppper extremities  . Lower extremity exercise program x20-30 reps per handout, with assistance as needed                     Time Tracking:     PT Received On: 11/30/20  PT Total Time (min):   23 minutes    Billable Minutes: Therapeutic Activity 23    Treatment Type: Treatment  PT/PTA: PTA     PTA Visit Number: 2     Gretta Loera PTA  11/30/2020

## 2020-12-01 LAB
POCT GLUCOSE: 128 MG/DL (ref 70–110)
POCT GLUCOSE: 188 MG/DL (ref 70–110)
POCT GLUCOSE: 244 MG/DL (ref 70–110)
POCT GLUCOSE: 283 MG/DL (ref 70–110)

## 2020-12-01 PROCEDURE — 97110 THERAPEUTIC EXERCISES: CPT

## 2020-12-01 PROCEDURE — 97530 THERAPEUTIC ACTIVITIES: CPT | Mod: CO

## 2020-12-01 PROCEDURE — 25000003 PHARM REV CODE 250: Performed by: HOSPITALIST

## 2020-12-01 PROCEDURE — 11000004 HC SNF PRIVATE

## 2020-12-01 PROCEDURE — 25000003 PHARM REV CODE 250: Performed by: NURSE PRACTITIONER

## 2020-12-01 PROCEDURE — C9399 UNCLASSIFIED DRUGS OR BIOLOG: HCPCS | Performed by: NURSE PRACTITIONER

## 2020-12-01 PROCEDURE — 97530 THERAPEUTIC ACTIVITIES: CPT

## 2020-12-01 PROCEDURE — 63600175 PHARM REV CODE 636 W HCPCS: Performed by: HOSPITALIST

## 2020-12-01 PROCEDURE — 97110 THERAPEUTIC EXERCISES: CPT | Mod: CO

## 2020-12-01 RX ORDER — DICLOFENAC SODIUM 10 MG/G
2 GEL TOPICAL 2 TIMES DAILY
Status: DISCONTINUED | OUTPATIENT
Start: 2020-12-01 | End: 2020-12-12 | Stop reason: HOSPADM

## 2020-12-01 RX ADMIN — MICONAZOLE NITRATE: 20 OINTMENT TOPICAL at 09:12

## 2020-12-01 RX ADMIN — Medication 400 MG: at 09:12

## 2020-12-01 RX ADMIN — BUMETANIDE 2 MG: 1 TABLET ORAL at 09:12

## 2020-12-01 RX ADMIN — ATORVASTATIN CALCIUM 40 MG: 20 TABLET, FILM COATED ORAL at 09:12

## 2020-12-01 RX ADMIN — INSULIN DETEMIR 12 UNITS: 100 INJECTION, SOLUTION SUBCUTANEOUS at 09:12

## 2020-12-01 RX ADMIN — INSULIN ASPART 1 UNITS: 100 INJECTION, SOLUTION INTRAVENOUS; SUBCUTANEOUS at 09:12

## 2020-12-01 RX ADMIN — DICLOFENAC 2 G: 10 GEL TOPICAL at 10:12

## 2020-12-01 RX ADMIN — CLOPIDOGREL 75 MG: 75 TABLET, FILM COATED ORAL at 10:12

## 2020-12-01 RX ADMIN — HEPARIN SODIUM 7500 UNITS: 5000 INJECTION INTRAVENOUS; SUBCUTANEOUS at 05:12

## 2020-12-01 RX ADMIN — ACETAMINOPHEN 650 MG: 325 TABLET ORAL at 09:12

## 2020-12-01 RX ADMIN — HEPARIN SODIUM 7500 UNITS: 5000 INJECTION INTRAVENOUS; SUBCUTANEOUS at 02:12

## 2020-12-01 RX ADMIN — ASPIRIN 81 MG CHEWABLE TABLET 81 MG: 81 TABLET CHEWABLE at 09:12

## 2020-12-01 RX ADMIN — FLUTICASONE PROPIONATE 100 MCG: 50 SPRAY, METERED NASAL at 09:12

## 2020-12-01 RX ADMIN — HEPARIN SODIUM 7500 UNITS: 5000 INJECTION INTRAVENOUS; SUBCUTANEOUS at 09:12

## 2020-12-01 RX ADMIN — INSULIN ASPART 2 UNITS: 100 INJECTION, SOLUTION INTRAVENOUS; SUBCUTANEOUS at 05:12

## 2020-12-01 NOTE — PLAN OF CARE
"CM spoke with Hayley (daughter). Explained that the patient wants to discharge home today with no home health. This is not the recommendation from PT/OT and is an unsafe discharge plan. Hayley stated that she is aware that her mom wants to go home but also agrees that it is not a good idea at this time. VEENA explained that the recommendation is for 24 hr care and that she is total assist X 2. Tentative discharge date of 12/15 provided.  Hayley said that her mom was able to transfer from bed to chair when she was at home. "My mom just doesn't want to do it". "She is able to". "We had to encourage her to do more when she was home". Hayley stated she will talk to her siblings about the discharge plan. CM will continue to follow up.   "

## 2020-12-01 NOTE — PLAN OF CARE
Goals revised due to slow patient progress. Pt will continue PT POC.  Kriss Mendoza, PT  12/1/2020    Problem: Physical Therapy Goal  Goal: Physical Therapy Goal  Description: Goals to be met by: 12/15/20    Patient will increase functional independence with mobility by performing:    . Supine to sit with ModA  . Sit to supine with ModA  . Rolling to Left and Right with [place]  . Sit to stand transfer with ModA  . Bed to chair transfer with ModA using Rolling Walker  . Gait  x 8-10 feet with Minimal Assistance using Rolling Walker/ or in // bars.  (discontinued 12/1/2020)  . Wheelchair propulsion x50 feet with Supervision using bilateral uppper extremities  . Lower extremity exercise program x20-30 reps per handout, with assistance as needed    Outcome: Ongoing, Progressing

## 2020-12-01 NOTE — PROGRESS NOTES
[facility]                                  Skilled Nursing Facility                   Progress Note     Admit Date: 11/24/2020  GABE 12/15/2020  Principal Problem:  Urinary tract infection without hematuria   HPI obtained from patient interview and chart review     Chief Complaint:  Increased left knee pain    HPI:   Mrs. Ortiz is a 75 year old female PMHx of T2DM, polyneuropathy, nephropathy, retinopathy, CKDIII-IV, essential HTN, mixed HLD, history of TIA, and chronic diastolic heart failure who presents to SNF following hospitalization for E coli UTI.  Admission to SNF for secondary weakness and debility.    Interval history:  Patient reporting increasing left knee pain, pain is exacerbated with movement and only slightly relieved with rest.  It is not worse any certain time of day.  Patient suffers chronically from bilateral knee pain.  I imagine pain is from increased immobility at SNF.  Patient often refuses to sit in bedside chair, she does not like how high the hospital bed is which requires her to use a slide board for transfer.  Patient is currently only able to stand on her feet for a few seconds.  Patient wishes to discharge from SNF asap and feels that she can manage her current functional status at home.  Discussion held with therapy and case management who then spoke to patient's daughter- refill the patient's goals are unrealistic at this time and recommend further therapy at SNF.  Patient's children are not comfortable with her discharging at this time.    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), Benign essential hypertension (11/14/2012), Cataract, Chronic kidney disease (CKD), stage III (moderate) (9/12/2013), Diabetic peripheral neuropathy associated with type 2 diabetes mellitus (11/14/2014), Gait disorder, Hyperlipidemia, Iritis - Both Eyes  (6/10/2013), Kidney stone, Lymphedema, Morbid obesity with BMI of 40.0-44.9, adult (2/18/2015), Nephrolithiasis (4/20/2016), NS (nuclear sclerosis) (4/1/2013), Nuclear sclerosis - Both Eyes (4/29/2013), Preseptal cellulitis - Right Eye (4/29/2013), Proliferative diabetic retinopathy - Both Eyes (4/29/2013), Proliferative diabetic retinopathy, both eyes (4/1/2013), PSC (posterior subcapsular cataract) - Both Eyes (4/29/2013), S/P hernia repair (12/19/2012), TIA (transient ischemic attack) (11/18/2014), Tinea pedis (7/24/2012), Type 2 diabetes mellitus with renal manifestations, controlled (12/12/2013), Type 2 diabetes, controlled, with moderate nonproliferative diabetic retinopathy without macular edema (9/17/2015), Ulcer of left lower extremity, limited to breakdown of skin (7/8/2015), Unspecified cerebral artery occlusion with cerebral infarction (11/16/2014), Unspecified venous (peripheral) insufficiency, Ureteral stone with hydronephrosis (1/27/2016), UTI (lower urinary tract infection), Vaginal infection, and 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    Past Surgical History: Patient has a past surgical history that includes Appendectomy; Cholecystectomy; Subtotal colectomy (12/13/2012); Eye surgery (Bilateral, 2008); Cataract extraction w/  intraocular lens implant (Left, 5/21/2013); Cataract extraction w/  intraocular lens implant (Right, 6/4/2013); Colonoscopy (12/22/2005); Esophagogastroduodenoscopy (12/21/2015); and Nasal septum surgery.    Social History: Patient reports that she quit smoking about 38 years ago. Her smoking use included cigarettes. She has a 7.50 pack-year smoking history. She has quit using smokeless tobacco. She reports that she does not drink alcohol or use drugs.    Family History: family history includes Cataracts in her brother and sister; Diabetes in her sister; Heart disease in her brother; Leukemia in her mother.    Allergies: Patient is allergic to penicillins and sulfa  (sulfonamide antibiotics).    ROS  Constitutional: Negative for fever   Eyes: Negative for blurred vision, double vision   Respiratory:  + for cough, shortness of breath- at baseline   Cardiovascular: Negative for chest pain, palpitations.  + leg swelling.   Gastrointestinal: Negative for abdominal pain, constipation, diarrhea, nausea, vomiting.   Genitourinary: Negative for dysuria, frequency   Musculoskeletal:  + generalized weakness.  +bilateral knee pain L>R  Skin: Negative for itching and rash.   Neurological: Negative for dizziness, headaches.   Psychiatric/Behavioral: Negative for depression. The patient is not nervous/anxious.      24 hour Vital Sign Range   Temp:  [97 °F (36.1 °C)]   Pulse:  [67]   Resp:  [18]   BP: (162)/(67)   SpO2:  [97 %]     PEx  Constitutional: Patient appears debilitated.  No distress noted  HENT:   Head: Normocephalic and atraumatic.   Eyes: Pupils are equal, round  Neck: Normal range of motion. Neck supple.   Cardiovascular: Normal rate, regular rhythm and normal heart sounds.    Pulmonary/Chest: Effort normal and breath sounds are clear  Abdominal: Soft. Bowel sounds are normal.   Musculoskeletal: Normal range of motion.   Neurological: Alert and oriented to person, place, and time.   Psychiatric: Normal mood and affect. Behavior is normal.   Skin: Skin is warm and dry.  +3 pitting edema to bilateral lower extremities    No results for input(s): GLUCOSE, NA, K, CL, CO2, BUN, CREATININE, MG in the last 24 hours.    Invalid input(s):  CALCIUM    No results for input(s): WBC, RBC, HGB, HCT, PLT, MCV, MCH, MCHC in the last 24 hours.      Assessment and Plan:     Problems addressed today    Bilateral knee pain  - initiated Voltaren gel 2 g BID, can use ice or heat intermittently for comfort, follow-up with orthopedics should problems persist for further management       Ongoing problems        Uncontrolled T2DM with neuropathy, nephropathy, and retinopathy  - Accu-Cheks AC/HS, diabetic  diet  - home regimen with glimepiride 2 mg daily, Lantus 30-35 units qHS.  - unstable, BGs with peaks and valleys- changed detemir to 12 units in a.m. and 10 units in the p.m.,, decreased to low- dose sliding scale insulin     Hypomagnesemia  - initiated magnesium oxide 400 mg BID x2 days    E coli ESBL UTI  - completed 3 day treatment of IV ertapenem 1g Q 24 hr   - currently denies any urinary symptoms  - call infection control to see if patient can be cleared to remove contact isolation, no answer, charge nurse to call back for clearance- infection control states to keep on since patient is incontinent    CKDIII  - improving, continue to monitor with twice weekly BMPs, avoid nephrotoxic agents and renally dose medications when appropriate    Chronic diastolic heart failure  Essential HTN  - stable, continue home Bumex 2 mg daily    Anemia of chronic renal disease  - stable, continue to monitor on twice weekly CBCs    Venous stasis dermatitis b/l  - follows with wound care on an outpatient basis, [facility] wound care nurse chart check patient and adjusted orders, will see patient on Tues 12/1       Ongoing but stable problems      History of TIA  Mixed HLD  - continue aspirin 81 mg daily, Plavix 75 mg daily, atorvastatin 40 mg qHS.    Morbid obesity  Debility  - weight loss encouraged, dietary consult and appreciate recommendations    Debility   - Continue with PT/OT for gait training and strengthening and restoration of ADL's   - Encourage mobility, OOB in chair, and early ambulation as appropriate  - Fall precautions   - Monitor for bowel and bladder dysfunction  - Monitor for and prevent skin breakdown and pressure ulcers  - Continue DVT prophylaxis with  heparin 7.5 K q.8 hours subcu       Future Appointments   Date Time Provider Department Center   12/10/2020  1:30 PM Claudette Juarez NP [facility] WOUND [facility]   12/28/2020  1:30 PM Ame Vasquez MD [facility] IM [facility] PCW       Yesi Adler NP  Department of Hospital  Medicine [facility]     DOS: 12/1/2020       Patient note was created using MModal Dictation.  Any errors in syntax or even information may not have been identified and edited on initial review prior to signing this note.

## 2020-12-01 NOTE — PT/OT/SLP PROGRESS
"Occupational Therapy   Treatment    Name: Sherin Ortiz  MRN: [id number]  Admit Date: 11/24/2020  Admitting Diagnosis:  Urinary tract infection without hematuria    General Precautions: Standard, fall   Orthopedic Precautions:N/A   Braces: N/A     Recommendations:     Discharge Recommendations: home with home health  Level of Assistance Recommended at Discharge:   24 hours significant assistance  Discharge Equipment Recommendations:  none  Barriers to discharge:  Decreased caregiver support    Assessment:     Sherin Ortiz is a 75 y.o. female with a medical diagnosis of Urinary tract infection without hematuria .  She presents with the following performance deficits affecting function are weakness, impaired endurance, impaired self care skills, impaired functional mobilty, impaired balance, decreased ROM, pain, decreased upper extremity function, decreased lower extremity function, decreased safety awareness. Pt stated she wants to go home and she can do all her ADL's on her own as well as  transfers. Pt with lots of encouragement on this day.Pt. participated well with session on this day. Pt was cooperative with therapy and tolerated treatment well. Pt. Will continue to benefit from continued OT to progress towards goals.     Rehab Potential is fair    Activity tolerance:  Fair    Plan:     Patient to be seen 5 x/week to address the above listed problems via self-care/home management, therapeutic activities, therapeutic exercises, wheelchair management/training    · Plan of Care Expires: 12/25/20  · Plan of Care Reviewed with: patient    Subjective     Communicated with: Nurse prior to session. "Soni" .    Pain/Comfort:  · Pain Rating 1: 8/10  · Location - Side 1: Left  · Location - Orientation 1: generalized  · Location 1: knee  · Pain Addressed 1: Pre-medicate for activity, Cessation of Activity  · Pain Rating Post-Intervention 1: 8/10    Patient's cultural, spiritual, Anabaptism conflicts given the current " situation:  · no    Objective:     Patient found supine with upon OT entry to room.    Bed Mobility:    · Patient completed Rolling/Turning to Left with  moderate assistance  · Patient completed Rolling/Turning to Right with moderate assistance  · Patient completed Scooting/Bridging with moderate assistance  · Patient completed Supine to Sit with minimum assistance     Functional Mobility/Transfers:  · Patient completed Sit <> Stand Transfer with maximal assistance  with  rolling walker   · Patient completed Bed <> Chair Transfer using sliding board technique with moderate assistance with rolling walker.  · Functional Mobility: Pt working on transfer x2 person for sliding board.       [facility] 6 Click ADL: 14    Treatment & Education:  Pt was educated on bed mobilities and sit to stand with RW x2 person.  Pt educated on all transfers and safety awareness in order to be able to return home safely.  Pt. Treated in room 2* to isolation precautions.     Patient left supine with all lines intact and call button in reachEducation:      GOALS:   Multidisciplinary Problems     Occupational Therapy Goals        Problem: Occupational Therapy Goal    Goal Priority Disciplines Outcome Interventions   Occupational Therapy Goal     OT, PT/OT Ongoing, Progressing    Description: Goals to be met by: 12/15/20     Patient will increase functional independence with ADLs by performing:    Feeding with Modified [place].  UE Dressing with Set-up Assistance.  LE Dressing with Moderate Assistance.  Grooming while seated with Set-up Assistance.  Toileting from bedside commode with Moderate Assistance for hygiene and clothing management.   Bathing from  sitting at sink with Minimal Assistance.  Supine to sit with Modified [place].  Stand pivot transfers with Moderate Assistance.  Upper extremity exercise program x10 minutes, with supervision.  Caregiver will be educated on,level of assist required to safely perform self care tasks and  functional transfers.                     Time Tracking:     OT Date of Treatment: OT Date of Treatment: 12/01/20  OT Total Time (min): Total Time (min): 46 min    Billable Minutes:Therapeutic Activity 30  Therapeutic Exercise 16    ZELDA Garcia  12/1/2020   OT and SANDOVAL have discussed the above patient goals and status in collaboration with Plan of Care

## 2020-12-01 NOTE — CONSULTS
"Wound care consulted for BLE and skin folds  PMH:  Tinea pedis, HTN, stage 3 chronic kidney disease, wheelchair dependent, Venous insufficiency of leg, dermatitis, stasis, DM 2, Bilateral leg edema  Assessment:  The patient is lying in bed supine, with HOB elevated above 30 degrees- eating breakfast.   The skin folds to the breast, abdominal pannus and groin areas are intact, no redness, no irritation.  The Bilateral lower legs are edematous, pitting edema, with ruptured blisters resulting in partial thickness skin loss.  The lower legs and dorsal feet have diffuse discoloration.   The BLE were cleansed with cleansing cloths, barrier cream to skin, Triad to partial thickness skin loss with gauze over the area, BLE wrapped with kerlex then tubigrip G from toes to 2" below knees  Recommendations:  Continue BLE dressing orders, add Triad ointment over the partial thickness skin loss areas then cover with gauze, wrap legs with kerlix/tubigrip G   Nursing to continue care, wound care will follow-up prn  Follow-up with outpatient wound care clinic  KELSY Ring RN, CWCN  [id number]   Right lateral lower leg    Right medial lower leg    Left medial lpwer leg    Left foot dorsal           "

## 2020-12-01 NOTE — H&P
"Hospital Medicine  Skilled Nursing Facility   History and Physical Exam      Date of Service: 11/30/2020      Patient Name: Sherin Ortiz  MRN: [id number]  Admission Date: 11/24/2020  Attending Physician: Humza Glover MD  Primary Care Provider: Ame Vasquez MD  Code Status: DNR (Do Not Resuscitate)      Principal problem:  Urinary tract infection without hematuria      Chief Complaint: No chief complaint on file.         HPI:   "Mrs. Ortiz is a 75 year old female PMHx of T2DM, polyneuropathy, nephropathy, retinopathy, CKDIII-IV, essential HTN, mixed HLD, history of TIA, and chronic diastolic heart failure who presents to SNF following hospitalization for E coli UTI.  Admission to SNF for secondary weakness and debility.     [place] [facility] [place] with complaints of weakness, nausea and vomiting.  Also reported associated dry cough along with dyspnea on exertion, which is chronic. Pt found to have E. Coli UTI." From Yesi Adler NP on 11/25/2020.     Says that she feels okay today. No dysuria. No SOB but does have HANDY.     Patient admitted with skilled services with PT and OT to improve functional status and ability to perform ADLs.       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    Benign essential hypertension 11/14/2012    Cataract     s/p phacoemulsification    Chronic kidney disease (CKD), stage III (moderate) 9/12/2013    Diabetic peripheral neuropathy associated with type 2 diabetes mellitus 11/14/2014    causing right hemiparesis    Gait disorder     Hyperlipidemia     Iritis - Both Eyes 6/10/2013    Kidney stone     Lymphedema     Morbid obesity with BMI of 40.0-44.9, adult 2/18/2015    Nephrolithiasis 4/20/2016    NS (nuclear sclerosis) 4/1/2013    Nuclear sclerosis - Both Eyes 4/29/2013    Preseptal cellulitis - Right Eye 4/29/2013    Proliferative diabetic retinopathy - Both " Eyes 2013    Proliferative diabetic retinopathy, both eyes 2013    PSC (posterior subcapsular cataract) - Both Eyes 2013    S/P hernia repair 2012    TIA (transient ischemic attack) 2014    Tinea pedis 2012    Tinea pedis is present on both feet.     Type 2 diabetes mellitus with renal manifestations, controlled 2013    Type 2 diabetes, controlled, with moderate nonproliferative diabetic retinopathy without macular edema 2015    Ulcer of left lower extremity, limited to breakdown of skin 2015    Unspecified cerebral artery occlusion with cerebral infarction 2014    Unspecified venous (peripheral) insufficiency     Ureteral stone with hydronephrosis 2016    UTI (lower urinary tract infection)     Vaginal infection     Vertical heterotropia - Both Eyes 2013       Past Surgical History:   Past Surgical History:   Procedure Laterality Date    APPENDECTOMY      CATARACT EXTRACTION W/  INTRAOCULAR LENS IMPLANT Left 2013    CATARACT EXTRACTION W/  INTRAOCULAR LENS IMPLANT Right 2013    CHOLECYSTECTOMY      COLONOSCOPY  2005    normal    ESOPHAGOGASTRODUODENOSCOPY  2015    hiatal hernia, Schatzki ring    EYE SURGERY Bilateral 2008    laser surgery both eyes    NASAL SEPTUM SURGERY      SUBTOTAL COLECTOMY  2012    transverse colon, for incarcerated umbilical hernia, Dr. Kat Bower       Social History:   Tobacco Use    Smoking status: Former Smoker     Packs/day: 0.50     Years: 15.00     Pack years: 7.50     Types: Cigarettes     Quit date: 1982     Years since quittin.4    Smokeless tobacco: Former User    Tobacco comment: smoked one pack per week   Substance and Sexual Activity    Alcohol use: No    Drug use: No    Sexual activity: Not Currently       Family History:   Family History     Problem Relation (Age of Onset)    Cataracts Sister, Brother    Diabetes Sister    Heart disease  "Brother    Leukemia Mother          No current facility-administered medications on file prior to encounter.      Current Outpatient Medications on File Prior to Encounter   Medication Sig    ACCU-CHEK RAMIRO PLUS TEST STRP Strp TEST TWICE DAILY    ACCU-CHEK SOFTCLIX LANCETS Misc Test twice daily    aspirin 81 MG Chew Take 81 mg by mouth once daily.      atorvastatin (LIPITOR) 40 MG tablet TAKE 1 TABLET EVERY EVENING    BD INSULIN SYRINGE ULTRA-FINE 0.5 mL 30 gauge x 1/2" Syrg USE TO INJECT EVERY NIGHT    blood-glucose meter Misc Dispense one meter: Insurance Brand Preference Accu-Chek    bumetanide (BUMEX) 1 MG tablet Take 2 tablets (2 mg total) by mouth once daily.    clopidogreL (PLAVIX) 75 mg tablet TAKE 1 TABLET EVERY DAY    glimepiride (AMARYL) 1 MG tablet Take 2 tablets (2 mg total) by mouth once daily.    insulin glargine (LANTUS U-100 INSULIN) 100 unit/mL injection INJECT 30-35 UNITS SUBCUTANEOUSLY IN THE EVENING DEPENDING ON SCALE (DISCARD EACH VIAL AFTER 28 DAYS)    insulin syr/ndl U100 half yesenia (DROPLET INSULIN SYR HALF UNIT) 0.5 mL 30 gauge x 1/2" Syrg USE TO INJECT EVERY NIGHT    ketoconazole (NIZORAL) 2 % cream LEONID EXT AA QD    miconazole nitrate 2% (MICOTIN) 2 % Oint Apply topically 2 (two) times daily.    nitrofurantoin (MACRODANTIN) 100 MG capsule Take 1 capsule (100 mg total) by mouth every 12 (twelve) hours.    silver sulfADIAZINE 1% (SILVADENE) 1 % cream Apply topically once daily.    triamcinolone acetonide 0.1% (KENALOG) 0.1 % cream APPLY TOPICALLY TWICE DAILY       Allergies:   Review of patient's allergies indicates:   Allergen Reactions    Penicillins Hives     Other reaction(s): Hives  Patient has received cefdinir, ceftriaxone, cefazolin and cefepime in the past with no documented reactions    Sulfa (sulfonamide antibiotics) Other (See Comments)     Eyad pt states her doctor told her the shakes were possibly caused by an allergy to sulfa       ROS:  Review of Systems "   Constitutional: Positive for appetite change. Negative for diaphoresis and fever.   HENT: Negative for congestion, nosebleeds, sinus pressure and sore throat.    Eyes: Negative for pain and itching.   Respiratory: Positive for cough. Negative for shortness of breath and wheezing.    Cardiovascular: Positive for leg swelling. Negative for chest pain and palpitations.   Gastrointestinal: Negative for abdominal pain, blood in stool, nausea and vomiting.   Endocrine: Negative for polydipsia and polyphagia.   Genitourinary: Negative for difficulty urinating, dysuria, frequency and hematuria.   Musculoskeletal: Positive for arthralgias. Negative for back pain and myalgias.   Skin: Negative for pallor and rash.   Allergic/Immunologic: Negative for environmental allergies and food allergies.   Neurological: Positive for weakness. Negative for dizziness, syncope, light-headedness and headaches.   Hematological: Bruises/bleeds easily.   Psychiatric/Behavioral: Negative for agitation and confusion. The patient is not nervous/anxious.          Objective:  Temp:  [97 °F (36.1 °C)-97.6 °F (36.4 °C)]   Pulse:  [64-67]   Resp:  [18]   BP: (140-162)/(63-67)   SpO2:  [96 %-97 %]     Body mass index is 45.86 kg/m².      Physical Exam  Vitals signs and nursing note reviewed.   Constitutional:       General: She is not in acute distress.     Appearance: She is well-developed.   HENT:      Head: Normocephalic and atraumatic.      Right Ear: External ear normal.      Left Ear: External ear normal.      Nose: No mucosal edema.      Mouth/Throat:      Dentition: Abnormal dentition.      Pharynx: No oropharyngeal exudate.   Eyes:      General: No scleral icterus.     Conjunctiva/sclera: Conjunctivae normal.      Pupils: Pupils are equal, round, and reactive to light.   Neck:      Musculoskeletal: Neck supple. No muscular tenderness.      Trachea: Phonation normal. No tracheal deviation.   Cardiovascular:      Rate and Rhythm: Normal rate  and regular rhythm.      Pulses:           Radial pulses are 2+ on the right side and 2+ on the left side.      Heart sounds: S1 normal and S2 normal. No murmur.   Pulmonary:      Effort: Pulmonary effort is normal. No respiratory distress.      Breath sounds: Normal breath sounds. No wheezing or rales.   Chest:      Chest wall: No tenderness or crepitus.   Abdominal:      General: Bowel sounds are normal. There is no distension.      Palpations: Abdomen is soft.      Tenderness: There is no abdominal tenderness. There is no guarding or rebound.      Hernia: No hernia is present.   Musculoskeletal:         General: No tenderness.      Right lower le+ Pitting Edema present.      Left lower le+ Pitting Edema present.   Lymphadenopathy:      Cervical:      Right cervical: No superficial cervical adenopathy.     Left cervical: No superficial cervical adenopathy.      Upper Body:      Right upper body: No supraclavicular adenopathy.      Left upper body: No supraclavicular adenopathy.   Skin:     General: Skin is warm and dry.      Findings: No erythema or rash.      Nails: There is no clubbing.     Neurological:      Mental Status: She is alert and oriented to person, place, and time.      Motor: No tremor or seizure activity.   Psychiatric:         Mood and Affect: Mood normal.         Behavior: Behavior normal. Behavior is cooperative.         Thought Content: Thought content normal.         Significant Labs:   A1C:   Recent Labs   Lab 20  1210 20  0554   HGBA1C 9.4* 9.1*     TSH:   Recent Labs   Lab 20  0554   TSH 0.641       Significant Imaging: I have reviewed all pertinent imaging results/findings completed during prior hospitalization.      Assessment and Plan:  Active Diagnoses:    Diagnosis Date Noted POA    PRINCIPAL PROBLEM:  Urinary tract infection without hematuria [N39.0] 2020 Yes      Problems Resolved During this Admission:     E coli ESBL UTI  - Continue IV ertapenem 1 g  Q 24 hr for 1 more day to complete sufficient course.   - currently denies any urinary symptoms     Chronic diastolic heart failure  Essential HTN  - Appears hypovolemic  - Continue home Bumex 2 mg daily     History of TIA  Mixed HLD  - continue aspirin 81 mg daily, Plavix 75 mg daily, atorvastatin 40 mg qHS.     Venous stasis dermatitis b/l  - follows with wound care on an outpatient basis  - SNF wound care nurse chart check patient and adjusted orders, will see patient on Friday 11/27     Uncontrolled T2DM with neuropathy, nephropathy, and retinopathy  - Accu-Cheks AC/HS, diabetic diet  - home regimen with glimepiride 2 mg daily, Lantus 30-35 units qHS.  - continue detemir 20 units qHS., moderate dose sliding scale insulin     CKDIII  - stable  - continue to monitor with twice weekly BMPs, avoid nephrotoxic agents and renally dose medications when appropriate     Anemia of chronic renal disease  - stable  - continue to monitor on twice weekly CBCs     Morbid obesity  Debility  - weight loss encouraged, dietary consult and appreciate recommendations     Debility   - Continue with PT/OT for gait training and strengthening and restoration of ADL's   - Encourage mobility, OOB in chair, and early ambulation as appropriate  - Fall precautions   - Monitor for bowel and bladder dysfunction  - Monitor for and prevent skin breakdown and pressure ulcers  - Continue DVT prophylaxis with  heparin 7.5 K q.8 hours subcu       Anticipated Disposition:  Home with home health      Future Appointments   Date Time Provider Department Center   12/10/2020  1:30 PM Claudette Juarez NP [facility] WOUND [facility]   12/28/2020  1:30 PM Ame Vasquez MD [facility] IM [facility] PCW       I certify that SNF services are required to be given on an inpatient basis because Sherin Ortiz needs for skilled nursing care and/or skilled rehabilitation are required on a daily basis and such services can only practically be provided in a skilled nursing facility  setting and are for an ongoing condition for which she received inpatient care in the hospital.       Humza Glover MD  Department of Hospital Medicine   [facility] - Skilled Nursing Care

## 2020-12-01 NOTE — PT/OT/SLP PROGRESS
Physical Therapy Treatment    Patient Name:  Sherin Ortiz   MRN:  [id number]  Admit Date: 11/24/2020  Admitting Diagnosis: Urinary tract infection without hematuria  Recent Surgeries: N/A    General Precautions: Standard, fall, contact   Orthopedic Precautions:N/A   Braces:       This patient was treated, per MD order, according to the Contact and/or Special Contact Isolation Operational Standard inside their skilled nursing room.     Recommendations:     Discharge Recommendations:  home with home health   Level of Assistance Recommended at Discharge: 24 hours significant assistance  Discharge Equipment Recommendations: none   Barriers to discharge: Decreased caregiver support    Assessment:     Sherin Ortiz is a 75 y.o. female admitted with a medical diagnosis of Urinary tract infection without hematuria . Pt was limited t/o session by pain and weakness/fatigue. She adamently refused transfers and was only agreeable to sitting at EOB. She remains at a TotalA(x2) level for bed mobility. She will continue PT POC.      Performance deficits affecting function:  weakness, impaired endurance, impaired self care skills, impaired functional mobilty, gait instability, impaired balance, decreased upper extremity function, decreased lower extremity function, impaired skin, edema .    Rehab Potential is fair    Activity Tolerance: Fair    Plan:     Patient to be seen 5 x/week to address the above listed problems via gait training, therapeutic activities, therapeutic exercises    · Plan of Care Expires: 12/25/20  · Plan of Care Reviewed with: patient    Subjective     Pt initially declined session. She stated that she just wants to be home. Pt eventually agreeable to EOB therex.     Pain/Comfort:  · Pain Rating 1: 10/10  · Location - Side 1: Left  · Location - Orientation 1: generalized  · Location 1: knee  · Pain Addressed 1: Pre-medicate for activity, Reposition, Cessation of Activity, Other (see comments)(NP  notified)  · Pain Rating Post-Intervention 1: 8/10    Patient's cultural, spiritual, Sabianism conflicts given the current situation:  · no    Objective:     Communicated with patient and SANDOVAL prior to session.  Patient found supine with no lines   upon PT entry to room.     Therapeutic Activities and Exercises:   Seated EOB therex 1x10 reps (GS, HF, LAQ, AP w/ PROM). Pt reports she has been unable to pump ankles and also has decreased sensation.    Seated EOB hand taps w/ LUE (pt unable to use RUE due to decreased ROM) at various heights and directions. SBA provided.    Functional Mobility:  · Bed Mobility:    · Supine<>Sit on bed w/ TotalA(x2) for trunk and BLE  · Scoot to HOB in supine w/ bed in trendelenberg, ModA(x2) to slide, pt using side rail to assist  · Transfers:    · Pt declined  · Balance:   · Static/dynamic sit at EOB w/ initial ModA but able to progress to a SBA level once positioned properly    AM-PAC 6 CLICK MOBILITY  9    Patient left with bed in chair position with call button in reach.    GOALS:   Multidisciplinary Problems     Physical Therapy Goals        Problem: Physical Therapy Goal    Goal Priority Disciplines Outcome Goal Variances Interventions   Physical Therapy Goal     PT, PT/OT Ongoing, Progressing     Description: Goals to be met by: 12/15/20    Patient will increase functional independence with mobility by performing:    . Supine to sit with ModA  . Sit to supine with ModA  . Rolling to Left and Right with Jett  . Sit to stand transfer with ModA  . Bed to chair transfer with ModA using Rolling Walker  . Gait  x 8-10 feet with Minimal Assistance using Rolling Walker/ or in // bars.  (discontinued 12/1/2020)  . Wheelchair propulsion x50 feet with Supervision using bilateral uppper extremities  . Lower extremity exercise program x20-30 reps per handout, with assistance as needed                     Time Tracking:     PT Received On: 12/01/20  PT Total Time (min):   38    Billable  Minutes: Therapeutic Activity 23, Therapeutic Exercise 15 and Total Time 38    Treatment Type: Treatment  PT/PTA: PT     PTA Visit Number: 0     Kriss Mendoza PT  12/01/2020

## 2020-12-02 LAB
POCT GLUCOSE: 143 MG/DL (ref 70–110)
POCT GLUCOSE: 156 MG/DL (ref 70–110)
POCT GLUCOSE: 158 MG/DL (ref 70–110)
POCT GLUCOSE: 207 MG/DL (ref 70–110)

## 2020-12-02 PROCEDURE — 97535 SELF CARE MNGMENT TRAINING: CPT | Mod: CO

## 2020-12-02 PROCEDURE — 97530 THERAPEUTIC ACTIVITIES: CPT | Mod: CO

## 2020-12-02 PROCEDURE — 25000003 PHARM REV CODE 250: Performed by: NURSE PRACTITIONER

## 2020-12-02 PROCEDURE — 97530 THERAPEUTIC ACTIVITIES: CPT

## 2020-12-02 PROCEDURE — 25000003 PHARM REV CODE 250: Performed by: HOSPITALIST

## 2020-12-02 PROCEDURE — 11000004 HC SNF PRIVATE

## 2020-12-02 PROCEDURE — 63600175 PHARM REV CODE 636 W HCPCS: Performed by: HOSPITALIST

## 2020-12-02 RX ADMIN — FLUTICASONE PROPIONATE 100 MCG: 50 SPRAY, METERED NASAL at 08:12

## 2020-12-02 RX ADMIN — ASPIRIN 81 MG CHEWABLE TABLET 81 MG: 81 TABLET CHEWABLE at 08:12

## 2020-12-02 RX ADMIN — DICLOFENAC 2 G: 10 GEL TOPICAL at 09:12

## 2020-12-02 RX ADMIN — ATORVASTATIN CALCIUM 40 MG: 20 TABLET, FILM COATED ORAL at 09:12

## 2020-12-02 RX ADMIN — BUMETANIDE 2 MG: 1 TABLET ORAL at 08:12

## 2020-12-02 RX ADMIN — INSULIN ASPART 1 UNITS: 100 INJECTION, SOLUTION INTRAVENOUS; SUBCUTANEOUS at 09:12

## 2020-12-02 RX ADMIN — DICLOFENAC 2 G: 10 GEL TOPICAL at 08:12

## 2020-12-02 RX ADMIN — ACETAMINOPHEN 650 MG: 325 TABLET ORAL at 08:12

## 2020-12-02 RX ADMIN — HEPARIN SODIUM 7500 UNITS: 5000 INJECTION INTRAVENOUS; SUBCUTANEOUS at 05:12

## 2020-12-02 RX ADMIN — HEPARIN SODIUM 7500 UNITS: 5000 INJECTION INTRAVENOUS; SUBCUTANEOUS at 09:12

## 2020-12-02 RX ADMIN — MICONAZOLE NITRATE: 20 OINTMENT TOPICAL at 09:12

## 2020-12-02 RX ADMIN — CLOPIDOGREL 75 MG: 75 TABLET, FILM COATED ORAL at 08:12

## 2020-12-02 RX ADMIN — INSULIN DETEMIR 12 UNITS: 100 INJECTION, SOLUTION SUBCUTANEOUS at 08:12

## 2020-12-02 RX ADMIN — HEPARIN SODIUM 7500 UNITS: 5000 INJECTION INTRAVENOUS; SUBCUTANEOUS at 02:12

## 2020-12-02 RX ADMIN — MICONAZOLE NITRATE: 20 OINTMENT TOPICAL at 08:12

## 2020-12-02 NOTE — PT/OT/SLP PROGRESS
"Occupational Therapy   Treatment    Name: Sherin Ortiz  MRN: [id number]  Admit Date: 11/24/2020  Admitting Diagnosis:  Urinary tract infection without hematuria    General Precautions: Standard, fall   Orthopedic Precautions:N/A   Braces: N/A     Recommendations:     Discharge Recommendations: home with home health  Level of Assistance Recommended at Discharge:   24 hours significant assistance  Discharge Equipment Recommendations:  none  Barriers to discharge:  Decreased caregiver support    Assessment:     Sherin Ortiz is a 75 y.o. female with a medical diagnosis of Urinary tract infection without hematuria . She presents with the following performance deficits affecting function are weakness, impaired endurance, impaired self care skills, impaired functional mobilty, impaired balance, decreased ROM, pain, decreased upper extremity function, decreased lower extremity function, decreased safety awareness. Pt feeling much better and stated she will plan to stay for a couple of days.Pt. participated well with session on this day. Pt. Cooperative with therapy and tolerated treatment well. Pt. Will continue to benefit from continued OT to progress towards goals.     Rehab Potential is fair    Activity tolerance:  Fair    Plan:     Patient to be seen 5 x/week to address the above listed problems via self-care/home management, therapeutic activities, therapeutic exercises, wheelchair management/training    · Plan of Care Expires: 12/25/20  · Plan of Care Reviewed with: patient    Subjective     Communicated with: Nurse prior to session. " Good morning" .    Pain/Comfort:  · Pain Rating 1: 3/10  · Location - Side 1: Left  · Location - Orientation 1: generalized  · Location 1: knee  · Pain Addressed 1: Pre-medicate for activity, Cessation of Activity  · Pain Rating Post-Intervention 1: 3/10    Patient's cultural, spiritual, Muslim conflicts given the current situation:  · no    Objective:     Patient found supine " with   upon OT entry to room.    Bed Mobility:    · Patient completed Rolling/Turning to Left with  moderate assistance  · Patient completed Rolling/Turning to Right with moderate assistance  · Patient completed Scooting/Bridging with moderate assistance  · Patient completed Supine to Sit with stand by assistance     Functional Mobility/Transfers:  · Patient completed Bed <> Chair Transfer using sliding board technique with moderate assistance x2 person and verbal cues to lean forward and scoot.    Activities of Daily Living:  · Grooming: supervision to wash face and oral care seated at sink level.  · Upper Body Dressing: moderate assistance to shana/ thread BUE arms of a front zipper jacket while seated .  · Lower Body Dressing: total assistance to shana pants while lying supine in bed.  · Toileting: total assistance for hygiene and diaper management    [facility] 6 Click ADL: 14    Treatment & Education:  Pt. Participated in a towel exercise performing 2x 15 sets of lateral and circular motions on table top while seated to increase BUE ROM needed for independence with ADL's.  Pt. Treated in room 2* to isolation precautions.       Pt educated on safety with transfer and bed mobilities.    Patient left up in chair with all lines intact and call button in reachEducation:      GOALS:   Multidisciplinary Problems     Occupational Therapy Goals        Problem: Occupational Therapy Goal    Goal Priority Disciplines Outcome Interventions   Occupational Therapy Goal     OT, PT/OT Ongoing, Progressing    Description: Goals to be met by: 12/15/20     Patient will increase functional independence with ADLs by performing:    Feeding with Modified [place].  UE Dressing with Set-up Assistance.  LE Dressing with Moderate Assistance.  Grooming while seated with Set-up Assistance.  Toileting from bedside commode with Moderate Assistance for hygiene and clothing management.   Bathing from  sitting at sink with Minimal  Assistance.  Supine to sit with Modified [place].  Stand pivot transfers with Moderate Assistance.  Upper extremity exercise program x10 minutes, with supervision.  Caregiver will be educated on,level of assist required to safely perform self care tasks and functional transfers.                     Time Tracking:     OT Date of Treatment: OT Date of Treatment: 12/02/20  OT Total Time (min): Total Time (min): 43 min    Billable Minutes:Self Care/Home Management 23  Therapeutic Activity 20    ZELDA Garcia  12/2/2020..  OT and SANDOVAL have discussed the above patient goals and status in collaboration with Plan of Care

## 2020-12-02 NOTE — PT/OT/SLP PROGRESS
Physical Therapy Treatment    Patient Name:  Sherin Ortiz   MRN:  [id number]  Admit Date: 11/24/2020  Admitting Diagnosis: Urinary tract infection without hematuria  Recent Surgeries: none    General Precautions: Standard, fall, contact   Orthopedic Precautions:N/A   Braces:   none    Recommendations:     Discharge Recommendations:  home with home health   Level of Assistance Recommended at Discharge: 24 hours significant assistance  Discharge Equipment Recommendations: none   Barriers to discharge: Decreased caregiver support    Assessment:     Sherin Ortiz is a 75 y.o. female admitted with a medical diagnosis of Urinary tract infection without hematuria . Pt margarito session well w/ good participation. She was able to participate in transfers and static standing balance on this date. She is showing some progress but due to her deficits she continues to require significant assistance x2ppl for transfers. Pt will continue PT POC.      Performance deficits affecting function:  weakness, impaired endurance, impaired self care skills, impaired functional mobilty, gait instability, impaired balance, decreased upper extremity function, decreased lower extremity function, impaired skin, edema .    Rehab Potential is fair    Activity Tolerance: Good    Plan:     Patient to be seen 5 x/week to address the above listed problems via gait training, therapeutic activities, therapeutic exercises    · Plan of Care Expires: 12/25/20  · Plan of Care Reviewed with: patient    Subjective     Pt agreeable to session. She stated she has been able to eat more recently so she has more energy.     Pain/Comfort:  · Pain Rating 1: 3/10  · Location - Side 1: Left  · Location - Orientation 1: generalized  · Location 1: knee  · Pain Addressed 1: Pre-medicate for activity, Reposition  · Pain Rating Post-Intervention 1: 3/10    Patient's cultural, spiritual, Congregational conflicts given the current situation:  · no    Objective:     Communicated  with patient and SANDOVAL prior to session.  Patient found up in chair with   upon PT entry to room.     Therapeutic Activities and Exercises:   PT present for family conference phone call including patient, patient's daughter, and interdisciplinary team (rehab supervisor, , MDS coordinator, and dietician). Team informed pt and her daughter of her current level of function including needing assistance x2ppl for transfers in/out of bed. Pt and dgtr verb understanding. Pt agreeable to continue working on transfers while at SNF to improve her independence.     Functional Mobility:  · Bed Mobility:    · Sit>supine on bed w/ ModA(x2) for trnk and BLE  · Scoot to HOB w/ BUE on (R) bed rail and bed in trendelenburg, SBA  · Transfers:   · Sit<>Stand to/from w/c, x3 trials, w/ BUE on bed rail and ModA(x2) to rise, inc'd time and cues required  · Slide board w/c>EOB w/ MaxA(x2) to slide, inc'd time and cues required for overall technique  · Balance:   · Static sit at EOB w/ SBA  · Static stand x3 trials (60s, 30s, and 40s) w/ BUE on bed rail and Min/CGA(x2) for stability, pt w/ FFP including trunk/hip/knee flexion, pt unable to correct posture, limited in duration by fatigue    AM-PAC 6 CLICK MOBILITY  10    Patient left supine with call button in reach.    GOALS:   Multidisciplinary Problems     Physical Therapy Goals        Problem: Physical Therapy Goal    Goal Priority Disciplines Outcome Goal Variances Interventions   Physical Therapy Goal     PT, PT/OT Ongoing, Progressing     Description: Goals to be met by: 12/15/20    Patient will increase functional independence with mobility by performing:    . Supine to sit with ModA  . Sit to supine with ModA  . Rolling to Left and Right with Jett  . Sit to stand transfer with ModA  . Bed to chair transfer with ModA using Rolling Walker  . Gait  x 8-10 feet with Minimal Assistance using Rolling Walker/ or in // bars.  (discontinued 12/1/2020)  . Wheelchair propulsion x50  feet with Supervision using bilateral uppper extremities  . Lower extremity exercise program x20-30 reps per handout, with assistance as needed                     Time Tracking:     PT Received On: 12/02/20  PT Total Time (min):   53    Billable Minutes: Therapeutic Activity 53 and Total Time 53    Treatment Type: Treatment  PT/PTA: PT     PTA Visit Number: 0     Kriss Mendoza, PT  12/02/2020

## 2020-12-02 NOTE — NURSING
Both legs dressing changed as ordered dried blister area triad cream applied red areas dry applied purple top barrier cream.

## 2020-12-02 NOTE — CARE UPDATE
Interdisciplinary Team Meeting Note     Spoke with patient and daughter Jazmyn at [phone].     Patient/Family goals/wishes/concerns/needs: Patient wanted to d/c home against therapy recommendations. She is not a safe d/c. She agreed to stay with therapy for a few more days. She does not want home health therapy. Daughter will attempt to deliver electric chair so that Ms. Ortiz can practice therapy.     Updates on current plan of care: Ciara provided update with therapy.  RD advised that she should attempt to consume additional supplements.       Attended by:      - Lizz Hanks    - Inés Kearney   Therapy Manager - Ciara Sprague   Registered Dietitian - Sherin Mckoy   MDS - Karlene Das

## 2020-12-02 NOTE — PLAN OF CARE
Problem: Adult Inpatient Plan of Care  Goal: Plan of Care Review  Outcome: Ongoing, Progressing  Goal: Patient-Specific Goal (Individualization)  Outcome: Ongoing, Progressing  Goal: Absence of Hospital-Acquired Illness or Injury  Outcome: Ongoing, Progressing  Goal: Optimal Comfort and Wellbeing  Outcome: Ongoing, Progressing  Goal: Readiness for Transition of Care  Outcome: Ongoing, Progressing  Goal: Rounds/Family Conference  Outcome: Ongoing, Progressing     Problem: Adult Inpatient Plan of Care  Goal: Plan of Care Review  Outcome: Ongoing, Progressing  Goal: Patient-Specific Goal (Individualization)  Outcome: Ongoing, Progressing  Goal: Absence of Hospital-Acquired Illness or Injury  Outcome: Ongoing, Progressing  Goal: Optimal Comfort and Wellbeing  Outcome: Ongoing, Progressing  Goal: Readiness for Transition of Care  Outcome: Ongoing, Progressing  Goal: Rounds/Family Conference  Outcome: Ongoing, Progressing     Problem: Adult Inpatient Plan of Care  Goal: Plan of Care Review  Outcome: Ongoing, Progressing  Goal: Patient-Specific Goal (Individualization)  Outcome: Ongoing, Progressing  Goal: Absence of Hospital-Acquired Illness or Injury  Outcome: Ongoing, Progressing  Goal: Optimal Comfort and Wellbeing  Outcome: Ongoing, Progressing  Goal: Readiness for Transition of Care  Outcome: Ongoing, Progressing  Goal: Rounds/Family Conference  Outcome: Ongoing, Progressing     Problem: Adult Inpatient Plan of Care  Goal: Plan of Care Review  Outcome: Ongoing, Progressing  Goal: Patient-Specific Goal (Individualization)  Outcome: Ongoing, Progressing  Goal: Absence of Hospital-Acquired Illness or Injury  Outcome: Ongoing, Progressing  Goal: Optimal Comfort and Wellbeing  Outcome: Ongoing, Progressing  Goal: Readiness for Transition of Care  Outcome: Ongoing, Progressing  Goal: Rounds/Family Conference  Outcome: Ongoing, Progressing     Problem: Adult Inpatient Plan of Care  Goal: Plan of Care Review  Outcome:  Ongoing, Progressing  Goal: Patient-Specific Goal (Individualization)  Outcome: Ongoing, Progressing  Goal: Absence of Hospital-Acquired Illness or Injury  Outcome: Ongoing, Progressing  Goal: Optimal Comfort and Wellbeing  Outcome: Ongoing, Progressing  Goal: Readiness for Transition of Care  Outcome: Ongoing, Progressing  Goal: Rounds/Family Conference  Outcome: Ongoing, Progressing     Problem: Adult Inpatient Plan of Care  Goal: Plan of Care Review  Outcome: Ongoing, Progressing  Goal: Patient-Specific Goal (Individualization)  Outcome: Ongoing, Progressing  Goal: Absence of Hospital-Acquired Illness or Injury  Outcome: Ongoing, Progressing  Goal: Optimal Comfort and Wellbeing  Outcome: Ongoing, Progressing  Goal: Readiness for Transition of Care  Outcome: Ongoing, Progressing  Goal: Rounds/Family Conference  Outcome: Ongoing, Progressing     Problem: Adult Inpatient Plan of Care  Goal: Plan of Care Review  Outcome: Ongoing, Progressing  Goal: Patient-Specific Goal (Individualization)  Outcome: Ongoing, Progressing  Goal: Absence of Hospital-Acquired Illness or Injury  Outcome: Ongoing, Progressing  Goal: Optimal Comfort and Wellbeing  Outcome: Ongoing, Progressing  Goal: Readiness for Transition of Care  Outcome: Ongoing, Progressing  Goal: Rounds/Family Conference  Outcome: Ongoing, Progressing     Problem: Adult Inpatient Plan of Care  Goal: Plan of Care Review  Outcome: Ongoing, Progressing  Goal: Patient-Specific Goal (Individualization)  Outcome: Ongoing, Progressing  Goal: Absence of Hospital-Acquired Illness or Injury  Outcome: Ongoing, Progressing  Goal: Optimal Comfort and Wellbeing  Outcome: Ongoing, Progressing  Goal: Readiness for Transition of Care  Outcome: Ongoing, Progressing  Goal: Rounds/Family Conference  Outcome: Ongoing, Progressing     Problem: Adult Inpatient Plan of Care  Goal: Plan of Care Review  Outcome: Ongoing, Progressing  Goal: Patient-Specific Goal (Individualization)  Outcome:  Ongoing, Progressing  Goal: Absence of Hospital-Acquired Illness or Injury  Outcome: Ongoing, Progressing  Goal: Optimal Comfort and Wellbeing  Outcome: Ongoing, Progressing  Goal: Readiness for Transition of Care  Outcome: Ongoing, Progressing  Goal: Rounds/Family Conference  Outcome: Ongoing, Progressing     Problem: Adult Inpatient Plan of Care  Goal: Plan of Care Review  Outcome: Ongoing, Progressing  Goal: Patient-Specific Goal (Individualization)  Outcome: Ongoing, Progressing  Goal: Absence of Hospital-Acquired Illness or Injury  Outcome: Ongoing, Progressing  Goal: Optimal Comfort and Wellbeing  Outcome: Ongoing, Progressing  Goal: Readiness for Transition of Care  Outcome: Ongoing, Progressing  Goal: Rounds/Family Conference  Outcome: Ongoing, Progressing     Problem: Adult Inpatient Plan of Care  Goal: Plan of Care Review  Outcome: Ongoing, Progressing  Goal: Patient-Specific Goal (Individualization)  Outcome: Ongoing, Progressing  Goal: Absence of Hospital-Acquired Illness or Injury  Outcome: Ongoing, Progressing  Goal: Optimal Comfort and Wellbeing  Outcome: Ongoing, Progressing  Goal: Readiness for Transition of Care  Outcome: Ongoing, Progressing  Goal: Rounds/Family Conference  Outcome: Ongoing, Progressing     Problem: Adult Inpatient Plan of Care  Goal: Plan of Care Review  Outcome: Ongoing, Progressing  Goal: Patient-Specific Goal (Individualization)  Outcome: Ongoing, Progressing  Goal: Absence of Hospital-Acquired Illness or Injury  Outcome: Ongoing, Progressing  Goal: Optimal Comfort and Wellbeing  Outcome: Ongoing, Progressing  Goal: Readiness for Transition of Care  Outcome: Ongoing, Progressing  Goal: Rounds/Family Conference  Outcome: Ongoing, Progressing     Problem: Adult Inpatient Plan of Care  Goal: Plan of Care Review  Outcome: Ongoing, Progressing  Goal: Patient-Specific Goal (Individualization)  Outcome: Ongoing, Progressing  Goal: Absence of Hospital-Acquired Illness or  Injury  Outcome: Ongoing, Progressing  Goal: Optimal Comfort and Wellbeing  Outcome: Ongoing, Progressing  Goal: Readiness for Transition of Care  Outcome: Ongoing, Progressing  Goal: Rounds/Family Conference  Outcome: Ongoing, Progressing     Problem: Adult Inpatient Plan of Care  Goal: Plan of Care Review  Outcome: Ongoing, Progressing  Goal: Patient-Specific Goal (Individualization)  Outcome: Ongoing, Progressing  Goal: Absence of Hospital-Acquired Illness or Injury  Outcome: Ongoing, Progressing  Goal: Optimal Comfort and Wellbeing  Outcome: Ongoing, Progressing  Goal: Readiness for Transition of Care  Outcome: Ongoing, Progressing  Goal: Rounds/Family Conference  Outcome: Ongoing, Progressing     Problem: Adult Inpatient Plan of Care  Goal: Plan of Care Review  Outcome: Ongoing, Progressing  Goal: Patient-Specific Goal (Individualization)  Outcome: Ongoing, Progressing  Goal: Absence of Hospital-Acquired Illness or Injury  Outcome: Ongoing, Progressing  Goal: Optimal Comfort and Wellbeing  Outcome: Ongoing, Progressing  Goal: Readiness for Transition of Care  Outcome: Ongoing, Progressing  Goal: Rounds/Family Conference  Outcome: Ongoing, Progressing     Problem: Adult Inpatient Plan of Care  Goal: Plan of Care Review  Outcome: Ongoing, Progressing  Goal: Patient-Specific Goal (Individualization)  Outcome: Ongoing, Progressing  Goal: Absence of Hospital-Acquired Illness or Injury  Outcome: Ongoing, Progressing  Goal: Optimal Comfort and Wellbeing  Outcome: Ongoing, Progressing  Goal: Readiness for Transition of Care  Outcome: Ongoing, Progressing  Goal: Rounds/Family Conference  Outcome: Ongoing, Progressing     Problem: Adult Inpatient Plan of Care  Goal: Plan of Care Review  Outcome: Ongoing, Progressing  Goal: Patient-Specific Goal (Individualization)  Outcome: Ongoing, Progressing  Goal: Absence of Hospital-Acquired Illness or Injury  Outcome: Ongoing, Progressing  Goal: Optimal Comfort and  Wellbeing  Outcome: Ongoing, Progressing  Goal: Readiness for Transition of Care  Outcome: Ongoing, Progressing  Goal: Rounds/Family Conference  Outcome: Ongoing, Progressing     Problem: Adult Inpatient Plan of Care  Goal: Plan of Care Review  Outcome: Ongoing, Progressing  Goal: Patient-Specific Goal (Individualization)  Outcome: Ongoing, Progressing  Goal: Absence of Hospital-Acquired Illness or Injury  Outcome: Ongoing, Progressing  Goal: Optimal Comfort and Wellbeing  Outcome: Ongoing, Progressing  Goal: Readiness for Transition of Care  Outcome: Ongoing, Progressing  Goal: Rounds/Family Conference  Outcome: Ongoing, Progressing     Problem: Adult Inpatient Plan of Care  Goal: Plan of Care Review  Outcome: Ongoing, Progressing  Goal: Patient-Specific Goal (Individualization)  Outcome: Ongoing, Progressing  Goal: Absence of Hospital-Acquired Illness or Injury  Outcome: Ongoing, Progressing  Goal: Optimal Comfort and Wellbeing  Outcome: Ongoing, Progressing  Goal: Readiness for Transition of Care  Outcome: Ongoing, Progressing  Goal: Rounds/Family Conference  Outcome: Ongoing, Progressing     Problem: Adult Inpatient Plan of Care  Goal: Plan of Care Review  Outcome: Ongoing, Progressing  Goal: Patient-Specific Goal (Individualization)  Outcome: Ongoing, Progressing  Goal: Absence of Hospital-Acquired Illness or Injury  Outcome: Ongoing, Progressing  Goal: Optimal Comfort and Wellbeing  Outcome: Ongoing, Progressing  Goal: Readiness for Transition of Care  Outcome: Ongoing, Progressing  Goal: Rounds/Family Conference  Outcome: Ongoing, Progressing     Problem: Adult Inpatient Plan of Care  Goal: Plan of Care Review  Outcome: Ongoing, Progressing  Goal: Patient-Specific Goal (Individualization)  Outcome: Ongoing, Progressing  Goal: Absence of Hospital-Acquired Illness or Injury  Outcome: Ongoing, Progressing  Goal: Optimal Comfort and Wellbeing  Outcome: Ongoing, Progressing  Goal: Readiness for Transition of  Care  Outcome: Ongoing, Progressing  Goal: Rounds/Family Conference  Outcome: Ongoing, Progressing     Problem: Adult Inpatient Plan of Care  Goal: Plan of Care Review  Outcome: Ongoing, Progressing  Goal: Patient-Specific Goal (Individualization)  Outcome: Ongoing, Progressing  Goal: Absence of Hospital-Acquired Illness or Injury  Outcome: Ongoing, Progressing  Goal: Optimal Comfort and Wellbeing  Outcome: Ongoing, Progressing  Goal: Readiness for Transition of Care  Outcome: Ongoing, Progressing  Goal: Rounds/Family Conference  Outcome: Ongoing, Progressing     Problem: Adult Inpatient Plan of Care  Goal: Plan of Care Review  Outcome: Ongoing, Progressing  Goal: Patient-Specific Goal (Individualization)  Outcome: Ongoing, Progressing  Goal: Absence of Hospital-Acquired Illness or Injury  Outcome: Ongoing, Progressing  Goal: Optimal Comfort and Wellbeing  Outcome: Ongoing, Progressing  Goal: Readiness for Transition of Care  Outcome: Ongoing, Progressing  Goal: Rounds/Family Conference  Outcome: Ongoing, Progressing     Problem: Adult Inpatient Plan of Care  Goal: Plan of Care Review  Outcome: Ongoing, Progressing  Goal: Patient-Specific Goal (Individualization)  Outcome: Ongoing, Progressing  Goal: Absence of Hospital-Acquired Illness or Injury  Outcome: Ongoing, Progressing  Goal: Optimal Comfort and Wellbeing  Outcome: Ongoing, Progressing  Goal: Readiness for Transition of Care  Outcome: Ongoing, Progressing  Goal: Rounds/Family Conference  Outcome: Ongoing, Progressing     Problem: Adult Inpatient Plan of Care  Goal: Plan of Care Review  Outcome: Ongoing, Progressing  Goal: Patient-Specific Goal (Individualization)  Outcome: Ongoing, Progressing  Goal: Absence of Hospital-Acquired Illness or Injury  Outcome: Ongoing, Progressing  Goal: Optimal Comfort and Wellbeing  Outcome: Ongoing, Progressing  Goal: Readiness for Transition of Care  Outcome: Ongoing, Progressing  Goal: Rounds/Family Conference  Outcome:  Ongoing, Progressing     Problem: Adult Inpatient Plan of Care  Goal: Plan of Care Review  Outcome: Ongoing, Progressing  Goal: Patient-Specific Goal (Individualization)  Outcome: Ongoing, Progressing  Goal: Absence of Hospital-Acquired Illness or Injury  Outcome: Ongoing, Progressing  Goal: Optimal Comfort and Wellbeing  Outcome: Ongoing, Progressing  Goal: Readiness for Transition of Care  Outcome: Ongoing, Progressing  Goal: Rounds/Family Conference  Outcome: Ongoing, Progressing     Problem: Adult Inpatient Plan of Care  Goal: Plan of Care Review  Outcome: Ongoing, Progressing  Goal: Patient-Specific Goal (Individualization)  Outcome: Ongoing, Progressing  Goal: Absence of Hospital-Acquired Illness or Injury  Outcome: Ongoing, Progressing  Goal: Optimal Comfort and Wellbeing  Outcome: Ongoing, Progressing  Goal: Readiness for Transition of Care  Outcome: Ongoing, Progressing  Goal: Rounds/Family Conference  Outcome: Ongoing, Progressing     Problem: Adult Inpatient Plan of Care  Goal: Plan of Care Review  Outcome: Ongoing, Progressing  Goal: Patient-Specific Goal (Individualization)  Outcome: Ongoing, Progressing  Goal: Absence of Hospital-Acquired Illness or Injury  Outcome: Ongoing, Progressing  Goal: Optimal Comfort and Wellbeing  Outcome: Ongoing, Progressing  Goal: Readiness for Transition of Care  Outcome: Ongoing, Progressing  Goal: Rounds/Family Conference  Outcome: Ongoing, Progressing

## 2020-12-03 ENCOUNTER — LAB VISIT (OUTPATIENT)
Dept: LAB | Facility: OTHER | Age: 75
End: 2020-12-03
Payer: MEDICARE

## 2020-12-03 DIAGNOSIS — Z03.818 ENCOUNTER FOR OBSERVATION FOR SUSPECTED EXPOSURE TO OTHER BIOLOGICAL AGENTS RULED OUT: ICD-10-CM

## 2020-12-03 LAB
ANION GAP SERPL CALC-SCNC: 10 MMOL/L (ref 8–16)
BASOPHILS # BLD AUTO: 0.02 K/UL (ref 0–0.2)
BASOPHILS NFR BLD: 0.5 % (ref 0–1.9)
BUN SERPL-MCNC: 35 MG/DL (ref 8–23)
CALCIUM SERPL-MCNC: 8.9 MG/DL (ref 8.7–10.5)
CHLORIDE SERPL-SCNC: 101 MMOL/L (ref 95–110)
CO2 SERPL-SCNC: 29 MMOL/L (ref 23–29)
CREAT SERPL-MCNC: 1.8 MG/DL (ref 0.5–1.4)
DIFFERENTIAL METHOD: ABNORMAL
EOSINOPHIL # BLD AUTO: 0.1 K/UL (ref 0–0.5)
EOSINOPHIL NFR BLD: 1.8 % (ref 0–8)
ERYTHROCYTE [DISTWIDTH] IN BLOOD BY AUTOMATED COUNT: 13.2 % (ref 11.5–14.5)
EST. GFR  (AFRICAN AMERICAN): 30.9 ML/MIN/1.73 M^2
EST. GFR  (NON AFRICAN AMERICAN): 26.8 ML/MIN/1.73 M^2
GLUCOSE SERPL-MCNC: 194 MG/DL (ref 70–110)
HCT VFR BLD AUTO: 33.4 % (ref 37–48.5)
HGB BLD-MCNC: 10.4 G/DL (ref 12–16)
IMM GRANULOCYTES # BLD AUTO: 0.03 K/UL (ref 0–0.04)
IMM GRANULOCYTES NFR BLD AUTO: 0.8 % (ref 0–0.5)
LYMPHOCYTES # BLD AUTO: 1.5 K/UL (ref 1–4.8)
LYMPHOCYTES NFR BLD: 37.6 % (ref 18–48)
MAGNESIUM SERPL-MCNC: 1.8 MG/DL (ref 1.6–2.6)
MCH RBC QN AUTO: 28.2 PG (ref 27–31)
MCHC RBC AUTO-ENTMCNC: 31.1 G/DL (ref 32–36)
MCV RBC AUTO: 91 FL (ref 82–98)
MONOCYTES # BLD AUTO: 0.4 K/UL (ref 0.3–1)
MONOCYTES NFR BLD: 10.7 % (ref 4–15)
NEUTROPHILS # BLD AUTO: 1.9 K/UL (ref 1.8–7.7)
NEUTROPHILS NFR BLD: 48.6 % (ref 38–73)
NRBC BLD-RTO: 0 /100 WBC
PHOSPHATE SERPL-MCNC: 3 MG/DL (ref 2.7–4.5)
PLATELET # BLD AUTO: 360 K/UL (ref 150–350)
PMV BLD AUTO: 9.4 FL (ref 9.2–12.9)
POCT GLUCOSE: 204 MG/DL (ref 70–110)
POCT GLUCOSE: 227 MG/DL (ref 70–110)
POCT GLUCOSE: 240 MG/DL (ref 70–110)
POCT GLUCOSE: 310 MG/DL (ref 70–110)
POTASSIUM SERPL-SCNC: 3.7 MMOL/L (ref 3.5–5.1)
RBC # BLD AUTO: 3.69 M/UL (ref 4–5.4)
SODIUM SERPL-SCNC: 140 MMOL/L (ref 136–145)
URATE SERPL-MCNC: 13.9 MG/DL (ref 2.4–5.7)
WBC # BLD AUTO: 3.91 K/UL (ref 3.9–12.7)

## 2020-12-03 PROCEDURE — 11000004 HC SNF PRIVATE

## 2020-12-03 PROCEDURE — 97803 MED NUTRITION INDIV SUBSEQ: CPT

## 2020-12-03 PROCEDURE — 97110 THERAPEUTIC EXERCISES: CPT | Mod: CQ

## 2020-12-03 PROCEDURE — 63600175 PHARM REV CODE 636 W HCPCS: Performed by: HOSPITALIST

## 2020-12-03 PROCEDURE — U0003 INFECTIOUS AGENT DETECTION BY NUCLEIC ACID (DNA OR RNA); SEVERE ACUTE RESPIRATORY SYNDROME CORONAVIRUS 2 (SARS-COV-2) (CORONAVIRUS DISEASE [COVID-19]), AMPLIFIED PROBE TECHNIQUE, MAKING USE OF HIGH THROUGHPUT TECHNOLOGIES AS DESCRIBED BY CMS-2020-01-R: HCPCS

## 2020-12-03 PROCEDURE — 25000003 PHARM REV CODE 250: Performed by: NURSE PRACTITIONER

## 2020-12-03 PROCEDURE — 36415 COLL VENOUS BLD VENIPUNCTURE: CPT

## 2020-12-03 PROCEDURE — 84100 ASSAY OF PHOSPHORUS: CPT

## 2020-12-03 PROCEDURE — 80048 BASIC METABOLIC PNL TOTAL CA: CPT

## 2020-12-03 PROCEDURE — 25000003 PHARM REV CODE 250: Performed by: HOSPITALIST

## 2020-12-03 PROCEDURE — 84550 ASSAY OF BLOOD/URIC ACID: CPT

## 2020-12-03 PROCEDURE — 97535 SELF CARE MNGMENT TRAINING: CPT | Mod: CO

## 2020-12-03 PROCEDURE — 97530 THERAPEUTIC ACTIVITIES: CPT | Mod: CO

## 2020-12-03 PROCEDURE — 97530 THERAPEUTIC ACTIVITIES: CPT | Mod: CQ

## 2020-12-03 PROCEDURE — 85025 COMPLETE CBC W/AUTO DIFF WBC: CPT

## 2020-12-03 PROCEDURE — 83735 ASSAY OF MAGNESIUM: CPT

## 2020-12-03 RX ORDER — LANOLIN ALCOHOL/MO/W.PET/CERES
400 CREAM (GRAM) TOPICAL 2 TIMES DAILY
Status: COMPLETED | OUTPATIENT
Start: 2020-12-03 | End: 2020-12-04

## 2020-12-03 RX ORDER — POTASSIUM CHLORIDE 20 MEQ/1
20 TABLET, EXTENDED RELEASE ORAL ONCE
Status: COMPLETED | OUTPATIENT
Start: 2020-12-03 | End: 2020-12-03

## 2020-12-03 RX ADMIN — MICONAZOLE NITRATE: 20 OINTMENT TOPICAL at 08:12

## 2020-12-03 RX ADMIN — HEPARIN SODIUM 7500 UNITS: 5000 INJECTION INTRAVENOUS; SUBCUTANEOUS at 05:12

## 2020-12-03 RX ADMIN — INSULIN ASPART 2 UNITS: 100 INJECTION, SOLUTION INTRAVENOUS; SUBCUTANEOUS at 08:12

## 2020-12-03 RX ADMIN — HEPARIN SODIUM 7500 UNITS: 5000 INJECTION INTRAVENOUS; SUBCUTANEOUS at 10:12

## 2020-12-03 RX ADMIN — BUMETANIDE 2 MG: 1 TABLET ORAL at 08:12

## 2020-12-03 RX ADMIN — Medication 400 MG: at 10:12

## 2020-12-03 RX ADMIN — MICONAZOLE NITRATE: 20 OINTMENT TOPICAL at 10:12

## 2020-12-03 RX ADMIN — ASPIRIN 81 MG CHEWABLE TABLET 81 MG: 81 TABLET CHEWABLE at 08:12

## 2020-12-03 RX ADMIN — HEPARIN SODIUM 7500 UNITS: 5000 INJECTION INTRAVENOUS; SUBCUTANEOUS at 01:12

## 2020-12-03 RX ADMIN — DICLOFENAC 2 G: 10 GEL TOPICAL at 10:12

## 2020-12-03 RX ADMIN — ATORVASTATIN CALCIUM 40 MG: 20 TABLET, FILM COATED ORAL at 10:12

## 2020-12-03 RX ADMIN — INSULIN ASPART 4 UNITS: 100 INJECTION, SOLUTION INTRAVENOUS; SUBCUTANEOUS at 05:12

## 2020-12-03 RX ADMIN — Medication 400 MG: at 12:12

## 2020-12-03 RX ADMIN — INSULIN ASPART 2 UNITS: 100 INJECTION, SOLUTION INTRAVENOUS; SUBCUTANEOUS at 12:12

## 2020-12-03 RX ADMIN — POTASSIUM CHLORIDE 20 MEQ: 1500 TABLET, EXTENDED RELEASE ORAL at 12:12

## 2020-12-03 RX ADMIN — DICLOFENAC 2 G: 10 GEL TOPICAL at 08:12

## 2020-12-03 RX ADMIN — CLOPIDOGREL 75 MG: 75 TABLET, FILM COATED ORAL at 08:12

## 2020-12-03 RX ADMIN — FLUTICASONE PROPIONATE 100 MCG: 50 SPRAY, METERED NASAL at 08:12

## 2020-12-03 RX ADMIN — INSULIN ASPART 1 UNITS: 100 INJECTION, SOLUTION INTRAVENOUS; SUBCUTANEOUS at 10:12

## 2020-12-03 RX ADMIN — ACETAMINOPHEN 650 MG: 325 TABLET ORAL at 08:12

## 2020-12-03 NOTE — PROGRESS NOTES
[facility]                                  Skilled Nursing Facility                   Progress Note     Admit Date: 11/24/2020  GABE 12/15/2020  Principal Problem:  Urinary tract infection without hematuria   HPI obtained from patient interview and chart review     Chief Complaint: Re-evaluation of medical treatment and therapy status: Lab review, hyperglycemia, re-evaluation of bilateral knee pain, worsening renal function    HPI:   Mrs. Ortiz is a 75 year old female PMHx of T2DM, polyneuropathy, nephropathy, retinopathy, CKDIII-IV, essential HTN, mixed HLD, history of TIA, and chronic diastolic heart failure who presents to SNF following hospitalization for E coli UTI.  Admission to SNF for secondary weakness and debility.    Interval history: All of today's labs reviewed and are listed below.  K 3.7, BUN/creatinine increased to 35/1.8 from 30/1.5, Mag 1.8.  24 hr vital sign ranges listed below.  24 hr blood glucose range is 158-227.  Patient appears as if she is feeling better and states that she is happy with the progress that she is making with therapy over the last day.  Patient states that her bilateral knee pain has resolved.  Patient denies trouble sleeping at night, shortness of breath, abdominal discomfort, nausea, or vomiting.  Patient reports an adequate appetite.  Patient denies dysuria.  Patient reports having regular bowel movements.  Patient progessing with PT/OT. Continuing to follow and treat all acute and chronic conditions.    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), Benign essential hypertension (11/14/2012), Cataract, Chronic kidney disease (CKD), stage III (moderate) (9/12/2013), Diabetic peripheral neuropathy associated with type 2 diabetes mellitus (11/14/2014), Gait disorder, Hyperlipidemia, Iritis - Both Eyes (6/10/2013), Kidney stone,  Lymphedema, Morbid obesity with BMI of 40.0-44.9, adult (2/18/2015), Nephrolithiasis (4/20/2016), NS (nuclear sclerosis) (4/1/2013), Nuclear sclerosis - Both Eyes (4/29/2013), Preseptal cellulitis - Right Eye (4/29/2013), Proliferative diabetic retinopathy - Both Eyes (4/29/2013), Proliferative diabetic retinopathy, both eyes (4/1/2013), PSC (posterior subcapsular cataract) - Both Eyes (4/29/2013), S/P hernia repair (12/19/2012), TIA (transient ischemic attack) (11/18/2014), Tinea pedis (7/24/2012), Type 2 diabetes mellitus with renal manifestations, controlled (12/12/2013), Type 2 diabetes, controlled, with moderate nonproliferative diabetic retinopathy without macular edema (9/17/2015), Ulcer of left lower extremity, limited to breakdown of skin (7/8/2015), Unspecified cerebral artery occlusion with cerebral infarction (11/16/2014), Unspecified venous (peripheral) insufficiency, Ureteral stone with hydronephrosis (1/27/2016), UTI (lower urinary tract infection), Vaginal infection, and 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    Past Surgical History: Patient has a past surgical history that includes Appendectomy; Cholecystectomy; Subtotal colectomy (12/13/2012); Eye surgery (Bilateral, 2008); Cataract extraction w/  intraocular lens implant (Left, 5/21/2013); Cataract extraction w/  intraocular lens implant (Right, 6/4/2013); Colonoscopy (12/22/2005); Esophagogastroduodenoscopy (12/21/2015); and Nasal septum surgery.    Social History: Patient reports that she quit smoking about 38 years ago. Her smoking use included cigarettes. She has a 7.50 pack-year smoking history. She has quit using smokeless tobacco. She reports that she does not drink alcohol or use drugs.    Family History: family history includes Cataracts in her brother and sister; Diabetes in her sister; Heart disease in her brother; Leukemia in her mother.    Allergies: Patient is allergic to penicillins and sulfa (sulfonamide  antibiotics).    ROS  Constitutional: Negative for fever   Eyes: Negative for blurred vision, double vision   Respiratory:  + for cough, shortness of breath- at baseline   Cardiovascular: Negative for chest pain, palpitations.  + leg swelling.   Gastrointestinal: Negative for abdominal pain, constipation, diarrhea, nausea, vomiting.   Genitourinary: Negative for dysuria, frequency   Musculoskeletal:  + generalized weakness.  +bilateral knee pain L>R  Skin: Negative for itching and rash.   Neurological: Negative for dizziness, headaches.   Psychiatric/Behavioral: Negative for depression. The patient is not nervous/anxious.      24 hour Vital Sign Range   Temp:  [98 °F (36.7 °C)-98.1 °F (36.7 °C)]   Pulse:  [68-70]   Resp:  [18]   BP: (130-156)/(60-68)   SpO2:  [95 %-98 %]     PEx  Constitutional: Patient appears debilitated.  No distress noted  HENT:   Head: Normocephalic and atraumatic.   Eyes: Pupils are equal, round  Neck: Normal range of motion. Neck supple.   Cardiovascular: Normal rate, regular rhythm and normal heart sounds.    Pulmonary/Chest: Effort normal and breath sounds are clear  Abdominal: Soft. Bowel sounds are normal.   Musculoskeletal: Normal range of motion.   Neurological: Alert and oriented to person, place, and time.   Psychiatric: Normal mood and affect. Behavior is normal.   Skin: Skin is warm and dry.  +3 pitting edema to bilateral lower extremities    Recent Labs   Lab 12/03/20  0452      K 3.7      CO2 29   BUN 35*   CREATININE 1.8*   MG 1.8       Recent Labs   Lab 12/03/20  0453   WBC 3.91   RBC 3.69*   HGB 10.4*   HCT 33.4*   *   MCV 91   MCH 28.2   MCHC 31.1*         Assessment and Plan:     Problems addressed today    Bilateral knee pain  - patient states that bilaterally pain has resolved, ordered for uric acid level to assess for gout- which did come back elevated at 13.9- will not treat since patient is currently asymptomatic, continue Voltaren gel 2 g BID, can use  ice or heat intermittently for comfort, follow-up with orthopedics should problems persist for further management    Uncontrolled T2DM with neuropathy, nephropathy, and retinopathy  - Accu-Cheks AC/HS, diabetic diet  - home regimen with glimepiride 2 mg daily, Lantus 30-35 units qHS.  - unstable, increased detemir to 14 units in a.m. and 12 units in the p.m., continue low- dose sliding scale insulin     CKDIII  - worsening, checked uric acid level to see if patient is possibly having a gout attack- it is elevated but now patient is asymptomatic, continue to monitor for now with twice weekly BMPs, avoid nephrotoxic agents and renally dose medications when appropriate    Hypomagnesemia  - initiated magnesium oxide 400 mg BID x2 days    Hypokalemia  - initiated potassium 20 mEq x1 dose    E coli ESBL UTI  - completed 3 day treatment of IV ertapenem 1g Q 24 hr   - currently denies any urinary symptoms  - per infection Control, patient to remain on isolation precautions at SNF stay due to incontinence    Chronic diastolic heart failure  Essential HTN  - stable, continue home Bumex 2 mg daily    Anemia of chronic renal disease  - stable, continue to monitor on twice weekly CBCs    Venous stasis dermatitis b/l  - follows with wound care on an outpatient basis, [facility] wound care nurse chart check patient and adjusted orders, will see patient on Tues 12/1       Ongoing but stable problems      History of TIA  Mixed HLD  - continue aspirin 81 mg daily, Plavix 75 mg daily, atorvastatin 40 mg qHS.    Morbid obesity  Debility  - weight loss encouraged, dietary consult and appreciate recommendations    Debility   - Continue with PT/OT for gait training and strengthening and restoration of ADL's   - Encourage mobility, OOB in chair, and early ambulation as appropriate  - Fall precautions   - Monitor for bowel and bladder dysfunction  - Monitor for and prevent skin breakdown and pressure ulcers  - Continue DVT prophylaxis with   heparin 7.5 K q.8 hours subcu       Future Appointments   Date Time Provider Department Center   12/10/2020  1:30 PM Claudette Juarez NP [facility]   12/28/2020  1:30 PM Ame Vasquez MD [facility]       Yesi Adler NP  Department of [facility] Medicine   [facility] Sk[facility]     DOS: 12/3/2020       Patient note was created using MModal Dictation.  Any errors in syntax or even information may not have been identified and edited on initial review prior to signing this note.

## 2020-12-03 NOTE — PLAN OF CARE
Problem: Physical Therapy Goal  Goal: Physical Therapy Goal  Description: Goals to be met by: 12/15/20    Patient will increase functional independence with mobility by performing:    . Supine to sit with ModA  . Sit to supine with ModA  . Rolling to Left and Right with Jtet  . Sit to stand transfer with ModA  . Bed to chair transfer with ModA using Rolling Walker  . Gait  x 8-10 feet with Minimal Assistance using Rolling Walker/ or in // bars.  (discontinued 12/1/2020)  . Wheelchair propulsion x50 feet with Supervision using bilateral uppper extremities  . Lower extremity exercise program x20-30 reps per handout, with assistance as needed    Outcome: Ongoing, Progressing

## 2020-12-03 NOTE — PT/OT/SLP PROGRESS
"Physical Therapy Treatment    Patient Name:  Sherin Ortiz   MRN:  [id number]  Admit Date: 11/24/2020  Admitting Diagnosis: Urinary tract infection without hematuria    General Precautions: Standard, fall, contact   Orthopedic Precautions:N/A   Braces:       Recommendations:     Discharge Recommendations:  home with home health   Level of Assistance Recommended at Discharge: 24 hours significant assistance  Discharge Equipment Recommendations: none   Barriers to discharge: Decreased caregiver support    Assessment:     Sherin Ortiz is a 75 y.o. female admitted with a medical diagnosis of Urinary tract infection without hematuria . Patient tolerated treatment well focusing on bed mobility, transfers and therex. Patient will continue to improve with skilled physical therapy services in order to return to functional baseline.      Performance deficits affecting function:  weakness, impaired endurance, impaired self care skills, impaired functional mobilty, gait instability, impaired balance, decreased upper extremity function, decreased lower extremity function, impaired skin, edema .    Rehab Potential is good    Activity Tolerance: Good    Plan:     Patient to be seen 5 x/week to address the above listed problems via gait training, therapeutic activities, therapeutic exercises    · Plan of Care Expires: 12/25/20  · Plan of Care Reviewed with: patient    Subjective     "I'm good" Pt agreeable to therapy.     Pain/Comfort:  Pain Rating 1: other (see comments)(not rated)  Location - Side 1: Left  Location - Orientation 1: generalized  Location 1: knee  Pain Addressed 1: Reposition, Distraction  Pain Rating Post-Intervention 1: other (see comments)(not rated)    Patient's cultural, spiritual, Moravian conflicts given the current situation:  no    Objective:     Communicated with nursing prior to session.  Patient found HOB elevated with   upon PT entry to room.     Therapeutic Activities and Exercises:   2 x 10 AP, " LAQ, HF, abd/add, GS rest breaks as needed  Patient educated on importance of increased time out of bed and JUNIOR throughout the day    Functional Mobility:  · Bed Mobility:     · Rolling Right: stand by assistance  · Scooting: stand by assistance  · Supine to Sit: stand by assistance  · Improved with bed mobility while sitting up on R side of bed compared to previous sessions  · Transfers:     · Bed to Chair: contact guard assistance and minimum assistance x 2 for stability with  using  Slide Board  · Balance: seated EOB SBA/S ~10 minutes tolerated well  · Wheelchair Propulsion:  Pt propelled Standard wheelchair around room feet on Level tile with  Bilateral upper extremity and Bilateral lower extremity with Minimal Assistance.  · Pt repositioned in wc with Mod/Min A x 3 trials    AM-PAC 6 CLICK MOBILITY  12    Patient left up in chair with call button in reach and nurse notified.    GOALS:   Multidisciplinary Problems     Physical Therapy Goals        Problem: Physical Therapy Goal    Goal Priority Disciplines Outcome Goal Variances Interventions   Physical Therapy Goal     PT, PT/OT Ongoing, Progressing     Description: Goals to be met by: 12/15/20    Patient will increase functional independence with mobility by performing:    . Supine to sit with ModA  . Sit to supine with ModA  . Rolling to Left and Right with Jett  . Sit to stand transfer with ModA  . Bed to chair transfer with ModA using Rolling Walker  . Gait  x 8-10 feet with Minimal Assistance using Rolling Walker/ or in // bars.  (discontinued 12/1/2020)  . Wheelchair propulsion x50 feet with Supervision using bilateral uppper extremities  . Lower extremity exercise program x20-30 reps per handout, with assistance as needed                     Time Tracking:     PT Received On: 12/03/20  PT Total Time (min):       Billable Minutes: Therapeutic Activity 23 and Therapeutic Exercise 15    Treatment Type: Treatment  PT/PTA: PTA     PTA Visit Number: 1      Agnes Cooley, PTA  12/03/2020

## 2020-12-03 NOTE — PLAN OF CARE
Inadequate oral intake     Related to (etiology):   Decreased appetite     Signs and Symptoms (as evidenced by):   Pt reports poor appetite, decreased intake noted in hospital admission.     Interventions(treatment strategy):  Collaboration of care with providers.  Carbohydrate modified diet- 2000 calories  Commercial beverage- calories and protein- boost glucose BID  Resolving

## 2020-12-03 NOTE — PT/OT/SLP PROGRESS
"Occupational Therapy   Treatment    Name: Sherin Ortiz  MRN: [id number]  Admit Date: 11/24/2020  Admitting Diagnosis:  Urinary tract infection without hematuria    General Precautions: Standard, fall   Orthopedic Precautions:N/A   Braces:       Recommendations:     Discharge Recommendations: home with home health  Level of Assistance Recommended at Discharge:  24 hours significant assistance.  Discharge Equipment Recommendations:  none  Barriers to discharge:  Decreased caregiver support    Assessment:     Sherin Ortiz is a 75 y.o. female with a medical diagnosis of Urinary tract infection without hematuria.  She presents with the following performance deficits affecting function are weakness, impaired endurance, impaired self care skills, impaired functional mobilty, decreased lower extremity function, decreased safety awareness. Pt motivated  and eager to participate in session today.Pt. participated well with session on this day. Pt. Cooperative with therapy and tolerated treatment well. Pt. Will continue to benefit from continued OT to progress towards goals. Pt seen in the AM and PM.    Rehab Potential is fair    Activity tolerance:  Fair    Plan:     Patient to be seen 5 x/week to address the above listed problems via self-care/home management, therapeutic activities, therapeutic exercises, wheelchair management/training    · Plan of Care Expires: 12/25/20  · Plan of Care Reviewed with: patient    Subjective     Communicated with: Nurse prior to session. "Good morning" .    Pain/Comfort:  · Pain Rating 1: other (see comments)  · Location - Side 1: Left  · Location - Orientation 1: generalized  · Location 1: knee  · Pain Addressed 1: Reposition, Distraction  · Pain Rating Post-Intervention 1: other (see comments)    Patient's cultural, spiritual, Islam conflicts given the current situation:  · no    Objective:     Patient found up in chair with   upon OT entry to ayala   Bed Mobility  · Pt. completed " sit to supine with maximal assistance x2 person.  · Pt performed rolling R/L with moderate assistance.       Functional Mobility/Transfers:  · Patient completed Toilet Transfer Stand Pivot technique with maximal assistance x2 person with  grab bars and verbal cues for hand and feet placement.  · Pt completed chair to bed with slide board x2 person maximal assist.  ·     Activities of Daily Living:  · Grooming: supervision to wash face and oral care while seate at sink level.    [facility] 6 Click ADL: 14    Treatment & Education:  Pt performed seated table slides 2x15 reps with circular motions lateral clockwise and backward/ forward motions to increase BUE ROM.    Pt performed 2 toilet transfers with a rest break in between for independence with ADL'S at home.    Pt educated on safety with toilet transfers and use of grab bars to increase ADL performance and balance.    Patient left up in chair with all lines intact and call button in reachEducation:      GOALS:   Multidisciplinary Problems     Occupational Therapy Goals        Problem: Occupational Therapy Goal    Goal Priority Disciplines Outcome Interventions   Occupational Therapy Goal     OT, PT/OT Ongoing, Progressing    Description: Goals to be met by: 12/15/20     Patient will increase functional independence with ADLs by performing:    Feeding with Modified Walker.  UE Dressing with Set-up Assistance.  LE Dressing with Moderate Assistance.  Grooming while seated with Set-up Assistance.  Toileting from bedside commode with Moderate Assistance for hygiene and clothing management.   Bathing from  sitting at sink with Minimal Assistance.  Supine to sit with Modified Walker.  Stand pivot transfers with Moderate Assistance.  Upper extremity exercise program x10 minutes, with supervision.  Caregiver will be educated on,level of assist required to safely perform self care tasks and functional transfers.                     Time Tracking:     OT Date of  Treatment: OT Date of Treatment: 12/03/20  OT Total Time (min): Total Time (min): 41 min    Billable Minutes:Self Care/Home Management 26  Therapeutic Activity 15    ZELDA Garcia  12/3/2020   OT and SANDOVAL have discussed the above patient goals and status in collaboration with Plan of Care

## 2020-12-03 NOTE — PROGRESS NOTES
"[facility] - Skilled Nursing Care  Adult Nutrition  Progress Note    SUMMARY   Recommendations  Recommendation: Continue diabetic diet, boost glcuose BID, RD following  Goals: 1. Pt's intake meals >75% by RD follow up.  Nutrition Goal Status: progressing towards goal  Reason for Assessment    Reason For Assessment: RD follow-up  Relevant Medical History: DM2, HTN, HLD, TIA, CHF  Interdisciplinary Rounds: did not attend  General Information Comments: pt reports not always getting her supplement, boost gluose TID  Nutrition Discharge Planning: Discharge on low sodium, carb consistent diet.    Nutrition/Diet History    Patient Reported Diet/Restrictions/Preferences: general  Spiritual, Cultural Beliefs, Yazdanism Practices, Values that Affect Care: no    Anthropometrics    Temp: 98 °F (36.7 °C)  Height Method: Stated  Height: 5' 5" (165.1 cm)  Height (inches): 65 in  Weight Method: Bed Scale  Weight: 125 kg (275 lb 9.2 oz)  Weight (lb): 275.58 lb  Ideal Body Weight (IBW), Female: 125 lb  % Ideal Body Weight, Female (lb): 231.4 %  BMI (Calculated): 45.9  Weight Loss: (could be fluild loss as pt with edema to lower extremities)       Lab/Procedures/Meds    Pertinent Labs Reviewed: reviewed  Pertinent Labs Comments: A1c 9.1%, BUN 36, eGFR 28.7, Cr 1.7, Glu 132, Alk phos 195  Pertinent Medications Reviewed: reviewed  Pertinent Medications Comments: Mg , insulin       Estimated/Assessed Needs    Weight Used For Calorie Calculations: 131.2 kg (289 lb 3.9 oz)  Energy Calorie Requirements (kcal): 1798 kcal  Energy Need Method: [place]-St Jeor(PAL 1.0)  Protein Requirements: 92-118g  Weight Used For Protein Calculations: 131.2 kg (289 lb 3.9 oz)        RDA Method (mL): 1798  CHO Requirement: 224g      Nutrition Prescription Ordered    Current Diet Order: Diabetic 2000 calories, boost glucose TID  Nutrition Order Comments: LBM 12/2  Oral Nutrition Supplement: Boost glucose BID    Evaluation of Received Nutrient/Fluid " Intake    Energy Calories Required: meeting needs  Protein Required: meeting needs  Fluid Required: meeting needs  Comments: LBM 11/23  Tolerance: tolerating  % Intake of Estimated Energy Needs: 75 - 100 %  % Meal Intake: 75 - 100 %    Nutrition Risk    Level of Risk/Frequency of Follow-up: low(one time per week)     Assessment and Plan   Inadequate oral intake     Related to (etiology):   Decreased appetite     Signs and Symptoms (as evidenced by):   Pt reports poor appetite, decreased intake noted in hospital admission.     Interventions(treatment strategy):  Collaboration of care with providers.  Carbohydrate modified diet- 2000 calories  Commercial beverage- calories and protein- boost glucose BID      Monitor and Evaluation    Food and Nutrient Intake: energy intake, food and beverage intake  Food and Nutrient Adminstration: diet order  Knowledge/Beliefs/Attitudes: food and nutrition knowledge/skill  Anthropometric Measurements: weight, weight change, body mass index  Biochemical Data, Medical Tests and Procedures: electrolyte and renal panel, gastrointestinal profile, glucose/endocrine profile, inflammatory profile, lipid profile  Nutrition-Focused Physical Findings: overall appearance, extremities, muscles and bones, head and eyes, skin     Malnutrition Assessment   11/24                 Orbital Region (Subcutaneous Fat Loss): well nourished  Upper Arm Region (Subcutaneous Fat Loss): well nourished  Thoracic and Lumbar Region: well nourished   Adventism Region (Muscle Loss): well nourished  Clavicle Bone Region (Muscle Loss): well nourished  Clavicle and Acromion Bone Region (Muscle Loss): well nourished  Scapular Bone Region (Muscle Loss): well nourished  Dorsal Hand (Muscle Loss): moderate depletion  Patellar Region (Muscle Loss): well nourished  Anterior Thigh Region (Muscle Loss): (moderate edema)  Posterior Calf Region (Muscle Loss): (moderate edema)   Edema (Fluid Accumulation): 3-->moderate(lower  extremities)             Nutrition Follow-Up    RD Follow-up?: Yes

## 2020-12-04 LAB
POCT GLUCOSE: 152 MG/DL (ref 70–110)
POCT GLUCOSE: 196 MG/DL (ref 70–110)
POCT GLUCOSE: 223 MG/DL (ref 70–110)
POCT GLUCOSE: 228 MG/DL (ref 70–110)
SARS-COV-2 RNA RESP QL NAA+PROBE: NOT DETECTED

## 2020-12-04 PROCEDURE — 63600175 PHARM REV CODE 636 W HCPCS: Performed by: HOSPITALIST

## 2020-12-04 PROCEDURE — 25000003 PHARM REV CODE 250: Performed by: HOSPITALIST

## 2020-12-04 PROCEDURE — 97530 THERAPEUTIC ACTIVITIES: CPT | Mod: CO

## 2020-12-04 PROCEDURE — 97530 THERAPEUTIC ACTIVITIES: CPT | Mod: CQ

## 2020-12-04 PROCEDURE — 97110 THERAPEUTIC EXERCISES: CPT | Mod: CQ

## 2020-12-04 PROCEDURE — 11000004 HC SNF PRIVATE

## 2020-12-04 PROCEDURE — 25000003 PHARM REV CODE 250: Performed by: NURSE PRACTITIONER

## 2020-12-04 RX ORDER — MUPIROCIN 20 MG/G
OINTMENT TOPICAL 2 TIMES DAILY
Status: COMPLETED | OUTPATIENT
Start: 2020-12-04 | End: 2020-12-09

## 2020-12-04 RX ADMIN — INSULIN ASPART 2 UNITS: 100 INJECTION, SOLUTION INTRAVENOUS; SUBCUTANEOUS at 06:12

## 2020-12-04 RX ADMIN — Medication 400 MG: at 11:12

## 2020-12-04 RX ADMIN — BUMETANIDE 2 MG: 1 TABLET ORAL at 11:12

## 2020-12-04 RX ADMIN — DICLOFENAC 2 G: 10 GEL TOPICAL at 11:12

## 2020-12-04 RX ADMIN — MICONAZOLE NITRATE: 20 OINTMENT TOPICAL at 11:12

## 2020-12-04 RX ADMIN — ASPIRIN 81 MG CHEWABLE TABLET 81 MG: 81 TABLET CHEWABLE at 11:12

## 2020-12-04 RX ADMIN — HEPARIN SODIUM 7500 UNITS: 5000 INJECTION INTRAVENOUS; SUBCUTANEOUS at 10:12

## 2020-12-04 RX ADMIN — FLUTICASONE PROPIONATE 100 MCG: 50 SPRAY, METERED NASAL at 11:12

## 2020-12-04 RX ADMIN — ATORVASTATIN CALCIUM 40 MG: 20 TABLET, FILM COATED ORAL at 10:12

## 2020-12-04 RX ADMIN — HEPARIN SODIUM 7500 UNITS: 5000 INJECTION INTRAVENOUS; SUBCUTANEOUS at 02:12

## 2020-12-04 RX ADMIN — Medication 400 MG: at 10:12

## 2020-12-04 RX ADMIN — MICONAZOLE NITRATE: 20 OINTMENT TOPICAL at 10:12

## 2020-12-04 RX ADMIN — HEPARIN SODIUM 7500 UNITS: 5000 INJECTION INTRAVENOUS; SUBCUTANEOUS at 05:12

## 2020-12-04 RX ADMIN — CLOPIDOGREL 75 MG: 75 TABLET, FILM COATED ORAL at 11:12

## 2020-12-04 RX ADMIN — MUPIROCIN: 20 OINTMENT TOPICAL at 10:12

## 2020-12-04 RX ADMIN — INSULIN ASPART 2 UNITS: 100 INJECTION, SOLUTION INTRAVENOUS; SUBCUTANEOUS at 12:12

## 2020-12-04 NOTE — PT/OT/SLP PROGRESS
"Occupational Therapy   Treatment    Name: Sherin Ortiz  MRN: [id number]  Admit Date: 11/24/2020  Admitting Diagnosis:  Urinary tract infection without hematuria    General Precautions: Standard, fall   Orthopedic Precautions:N/A   Braces:       Recommendations:     Discharge Recommendations: home with home health  Level of Assistance Recommended at Discharge: 24 hours physical assistance for all ADL's and home management tasks  Discharge Equipment Recommendations:  none  Barriers to discharge:  Decreased caregiver support    Assessment:     Sherin Ortiz is a 75 y.o. female with a medical diagnosis of Urinary tract infection without hematuria .  She presents with  Performance deficits affecting function are weakness, impaired endurance, impaired self care skills, impaired functional mobilty, decreased lower extremity function, decreased safety awareness.Pt. participated well with session on this day. Pt is progressing well with session on this day still continues to requires cues with aspects of safety . Pt. Will continue to benefit from continued OT to progress towards goals    Rehab Potential is fair    Activity tolerance:  Fair    Plan:     Patient to be seen 5 x/week to address the above listed problems via self-care/home management, therapeutic activities, therapeutic exercises, wheelchair management/training    · Plan of Care Expires: 12/25/20  · Plan of Care Reviewed with: patient    Subjective     Communicated with: nsleilani prior to session. "I am okay"    Pain/Comfort:  · Pain Rating 1: (not rated)  · Location - Side 1: Right  · Location - Orientation 1: generalized  · Location 1: shoulder  · Pain Addressed 1: Reposition  · Pain Rating Post-Intervention 1: (not rated)    Patient's cultural, spiritual, Protestant conflicts given the current situation:  · no    Objective:     Patient found HOB elevated with upon OT entry to room.    Bed Mobility:    · Patient completed Rolling/Turning to Left with  minimum " assistance  · Patient completed Rolling/Turning to Right with minimum assistance  · Patient completed Scooting/Bridging with contact guard assistance  · Patient completed Supine to Sit with stand by assistance     Functional Mobility/Transfers:  · Patient completed Sit <> Stand Transfer with maximal assistance x2 trials at EOB with  rolling walker   · Patient completed Bed <> Chair Transfer using Slide Board technique with minimum assistance with slide board  · Functional Mobility: not tested    Activities of Daily Living:  · Grooming: minimum assistance with hair care while seated at sink level. Pt. able to perform oral care and face washing with supervision  · Lower Body Dressing: maximal assistance to shana pants in supine  · Toileting: maximal assistance with hygiene and diaper management in supine     [facility] 6 Click ADL: 14    Treatment & Education:  Pt. Performed seated table slides 1x20 reps with circular motions, lateral motions, and backward/forward motions to increase BUE ROM     Pt engaged in dynamic seated activity of reaching forward and in lateral planes x multiple trials seated at EOM with CGA/SBA in order to encourage anterior weight shifts to assist with transfers    Pt. Performed static standing for 35 seconds at EOB with Max Assist to rise    This patient was treated, per MD order, according to the Contact and/or Special Contact Isolation Operational Standard inside of their skilled nursing room.     Patient left up in chair with all lines intact and call button in reachEducation:      GOALS:   Multidisciplinary Problems     Occupational Therapy Goals        Problem: Occupational Therapy Goal    Goal Priority Disciplines Outcome Interventions   Occupational Therapy Goal     OT, PT/OT Ongoing, Progressing    Description: Goals to be met by: 12/15/20     Patient will increase functional independence with ADLs by performing:    Feeding with Modified [place].  UE Dressing with Set-up  Assistance.  LE Dressing with Moderate Assistance.  Grooming while seated with Set-up Assistance.  Toileting from bedside commode with Moderate Assistance for hygiene and clothing management.   Bathing from  sitting at sink with Minimal Assistance.  Supine to sit with Modified [place].  Stand pivot transfers with Moderate Assistance.  Upper extremity exercise program x10 minutes, with supervision.  Caregiver will be educated on,level of assist required to safely perform self care tasks and functional transfers.                     Time Tracking:     OT Date of Treatment: OT Date of Treatment: 12/04/20  OT Total Time (min):  38    Billable Minutes:Therapeutic Activity 38    ZELDA Valadez  12/4/2020   OT and SANDOVAL have discussed the above patients goals and status in collaboration with Plan of Care.

## 2020-12-04 NOTE — PLAN OF CARE
Problem: Adult Inpatient Plan of Care  Goal: Plan of Care Review  Outcome: Ongoing, Progressing  Goal: Patient-Specific Goal (Individualization)  Outcome: Ongoing, Progressing  Goal: Absence of Hospital-Acquired Illness or Injury  Outcome: Ongoing, Progressing  Goal: Optimal Comfort and Wellbeing  Outcome: Ongoing, Progressing     Problem: Wound  Goal: Optimal Wound Healing  Outcome: Ongoing, Progressing     Problem: Fall Injury Risk  Goal: Absence of Fall and Fall-Related Injury  Outcome: Ongoing, Progressing     Problem: Skin Injury Risk Increased  Goal: Skin Health and Integrity  Outcome: Ongoing, Progressing     Problem: Infection  Goal: Infection Symptom Resolution  Outcome: Ongoing, Progressing     Problem: Oral Intake Inadequate  Goal: Improved Oral Intake  Outcome: Ongoing, Progressing

## 2020-12-04 NOTE — PT/OT/SLP PROGRESS
"Physical Therapy Treatment    Patient Name:  Sherin Ortiz   MRN:  [id number]  Admit Date: 11/24/2020  Admitting Diagnosis: Urinary tract infection without hematuria      General Precautions: Standard, fall, contact   Orthopedic Precautions:N/A   Braces:       Recommendations:     Discharge Recommendations:  home with home health   Level of Assistance Recommended at Discharge: 24 hours significant assistance  Discharge Equipment Recommendations: none   Barriers to discharge: Decreased caregiver support    Assessment:     Sherin Ortiz is a 75 y.o. female admitted with a medical diagnosis of Urinary tract infection without hematuria . Pt tolerated well, pt would continue to benefit from skilled PT services to improve overall functional mobility, strength and endurance.  .      Performance deficits affecting function:  weakness, impaired endurance, impaired self care skills, impaired functional mobilty, gait instability, impaired balance, decreased upper extremity function, decreased lower extremity function, impaired skin, edema .    Rehab Potential is good    Activity Tolerance: Fair    Plan:     Patient to be seen 5 x/week to address the above listed problems via gait training, therapeutic activities, therapeutic exercises    · Plan of Care Expires: 12/25/20  · Plan of Care Reviewed with: patient    Subjective     "Have to use the bathroom" agreeable to therapy.     Pain/Comfort:  · Pain Rating 1: (did not rate)  · Location - Side 1: Right  · Location - Orientation 1: generalized  · Location 1: shoulder  · Pain Addressed 1: Cessation of Activity(declined needing meds)    Patient's cultural, spiritual, Baptist conflicts given the current situation:  · no    Objective:       Patient found up in chair with (in WC) upon PT entry to room.     Therapeutic Activities and Exercises: 2x10 reps GS,LAQ,hip flex    Functional Mobility:  · Bed Mobility:     · Rolling Left:  stand by assistance with rail to remove " pants  · Rolling Right: stand by assistance with rail to remove pants  · Scooting: stand by assistance with rail to HOB  · Sit to Supine: stand by assistance with pt in long sitting bringing up legs with BUE pulling on pants  · Transfers:     · Sit to Stand:  contact guard assistance and minimum assistance with using wc arm rest and bed rail  · Bed to Chair: minimum assistance and moderate assistance with  bed rail and wc arm rest  using  Squat Pivot vcs for safety/tech  · Toilet Transfer: contact guard assistance and minimum assistance with  bed rail  using  Sit Stand 2nd person removing WC and replace with BSC x 2 trials  · Balance: static standing x 3 trials 2-3 minutes CGA with bed rail support FFP for cleaning and clothing mgmt    AM-PAC 6 CLICK MOBILITY  13    Patient left supine with call button in reach and belongings in reach.    GOALS:   Multidisciplinary Problems     Physical Therapy Goals        Problem: Physical Therapy Goal    Goal Priority Disciplines Outcome Goal Variances Interventions   Physical Therapy Goal     PT, PT/OT Ongoing, Progressing     Description: Goals to be met by: 12/15/20    Patient will increase functional independence with mobility by performing:    . Supine to sit with ModA  . Sit to supine with ModA  . Rolling to Left and Right with Jett  . Sit to stand transfer with ModA  . Bed to chair transfer with ModA using Rolling Walker  . Gait  x 8-10 feet with Minimal Assistance using Rolling Walker/ or in // bars.  (discontinued 12/1/2020)  . Wheelchair propulsion x50 feet with Supervision using bilateral uppper extremities  . Lower extremity exercise program x20-30 reps per handout, with assistance as needed                     Time Tracking:     PT Received On: 12/04/20  PT Total Time (min):   40      Billable Minutes: Therapeutic Activity 28 and Therapeutic Exercise 12    Treatment Type: Treatment  PT/PTA: PTA     PTA Visit Number: 2     Swetha Parsons PTA  12/04/2020

## 2020-12-05 LAB
POCT GLUCOSE: 156 MG/DL (ref 70–110)
POCT GLUCOSE: 221 MG/DL (ref 70–110)
POCT GLUCOSE: 222 MG/DL (ref 70–110)
POCT GLUCOSE: 226 MG/DL (ref 70–110)

## 2020-12-05 PROCEDURE — 11000004 HC SNF PRIVATE

## 2020-12-05 PROCEDURE — 63600175 PHARM REV CODE 636 W HCPCS: Performed by: HOSPITALIST

## 2020-12-05 PROCEDURE — 97535 SELF CARE MNGMENT TRAINING: CPT

## 2020-12-05 PROCEDURE — 25000003 PHARM REV CODE 250: Performed by: NURSE PRACTITIONER

## 2020-12-05 PROCEDURE — 97110 THERAPEUTIC EXERCISES: CPT

## 2020-12-05 PROCEDURE — 25000003 PHARM REV CODE 250: Performed by: HOSPITALIST

## 2020-12-05 RX ADMIN — HEPARIN SODIUM 7500 UNITS: 5000 INJECTION INTRAVENOUS; SUBCUTANEOUS at 02:12

## 2020-12-05 RX ADMIN — MICONAZOLE NITRATE: 20 OINTMENT TOPICAL at 09:12

## 2020-12-05 RX ADMIN — INSULIN ASPART 1 UNITS: 100 INJECTION, SOLUTION INTRAVENOUS; SUBCUTANEOUS at 09:12

## 2020-12-05 RX ADMIN — MUPIROCIN: 20 OINTMENT TOPICAL at 09:12

## 2020-12-05 RX ADMIN — HEPARIN SODIUM 7500 UNITS: 5000 INJECTION INTRAVENOUS; SUBCUTANEOUS at 09:12

## 2020-12-05 RX ADMIN — ATORVASTATIN CALCIUM 40 MG: 20 TABLET, FILM COATED ORAL at 09:12

## 2020-12-05 RX ADMIN — HEPARIN SODIUM 7500 UNITS: 5000 INJECTION INTRAVENOUS; SUBCUTANEOUS at 06:12

## 2020-12-05 RX ADMIN — ASPIRIN 81 MG CHEWABLE TABLET 81 MG: 81 TABLET CHEWABLE at 09:12

## 2020-12-05 RX ADMIN — FLUTICASONE PROPIONATE 100 MCG: 50 SPRAY, METERED NASAL at 09:12

## 2020-12-05 RX ADMIN — INSULIN ASPART 2 UNITS: 100 INJECTION, SOLUTION INTRAVENOUS; SUBCUTANEOUS at 12:12

## 2020-12-05 RX ADMIN — CLOPIDOGREL 75 MG: 75 TABLET, FILM COATED ORAL at 09:12

## 2020-12-05 RX ADMIN — BUMETANIDE 2 MG: 1 TABLET ORAL at 09:12

## 2020-12-05 RX ADMIN — INSULIN ASPART 2 UNITS: 100 INJECTION, SOLUTION INTRAVENOUS; SUBCUTANEOUS at 05:12

## 2020-12-05 NOTE — PT/OT/SLP PROGRESS
Occupational Therapy   Treatment    Name: Sherin Ortiz  MRN: [id number]  Admit Date: 11/24/2020  Admitting Diagnosis:  Urinary tract infection without hematuria    General Precautions: Standard, fall   Orthopedic Precautions:N/A   Braces:       Recommendations:     Discharge Recommendations: home with home health  Level of Assistance Recommended at Discharge: 24 hours light assistance for ADL's and homemaking tasks  Discharge Equipment Recommendations:  none  Barriers to discharge:  Decreased caregiver support    Assessment:     Sherin Ortiz is a 75 y.o. female with a medical diagnosis of Urinary tract infection without hematuria .  She presents with deficits in functional mobility and self-care tasks. Pt. Required Min A x 2 for sit to stand transfers from EOB on this date with RW and Mod A x 2 SQPT. Pt. Noted to have limitations iand pain in LLE as well as RUE.pt. would benefit from continued OT services to maximize safety and I with ADL tasks    Rehab Potential is good    Activity tolerance:  Good    Plan:     Patient to be seen 5 x/week to address the above listed problems via self-care/home management, therapeutic activities, therapeutic exercises, wheelchair management/training    · Plan of Care Expires: 12/25/20  · Plan of Care Reviewed with: patient    Subjective     Pt. Reported that she has trouble moving her left foot because of her charcot foot    Communicated with: nurse prior to session. .    Pain/Comfort:  · Pain Rating 1: (did not quantify)  · Location - Side 1: Left  · Location 1: foot  · Pain Addressed 1: Reposition, Distraction  · Pain Rating Post-Intervention 1: 0/10(at rest)    Patient's cultural, spiritual, Druze conflicts given the current situation:  · no    Objective:     Patient found supine with (supine in bed) upon OT entry to room.    Bed Mobility:    · Patient completed Supine to Sit with stand by assistance with HOB elevated and use of bed rails    Functional  Mobility/Transfers:  · Patient completed Sit <> Stand Transfer with minimum assistance and of 2 persons  with  rolling walker x 2 trials from EOB   · Patient completed Bed <> Chair Transfer using Squat Pivot technique with moderate assistance and of 2 persons with no assistive device  · Functional Mobility: not tested    Activities of Daily Living:  · Upper Body Dressing: stand by assistance to don shirt  · Lower Body Dressing: maximal assistance to don pants in supine    [facility] 6 Click ADL: 15    OT Exercises: AROM to LUE x 2 sets 10 reps for all major planes of motion  without weight; RUE 2 sets 10 reps for elbow flex/ext and scapula adduction and depression x 1 sets 10 reps with 5 second holds     Treatment & Education:  Pt. Educated on positioning and there ex to RUE to assist with improving ROM   Pt. Educated on safety with transfers and ADL tasks  In PM: Pt. Performed SPT from w/c to bed using bedrail to assist to stand with LUE and Mod A x 2 . Pt. Performed sit to supine with Max A x 2     Patient left up in chair with call button in reachEducation:      GOALS:   Multidisciplinary Problems     Occupational Therapy Goals        Problem: Occupational Therapy Goal    Goal Priority Disciplines Outcome Interventions   Occupational Therapy Goal     OT, PT/OT Ongoing, Progressing    Description: Goals to be met by: 12/15/20     Patient will increase functional independence with ADLs by performing:    Feeding with Modified [place].  UE Dressing with Set-up Assistance.  LE Dressing with Moderate Assistance.  Grooming while seated with Set-up Assistance.  Toileting from bedside commode with Moderate Assistance for hygiene and clothing management.   Bathing from  sitting at sink with Minimal Assistance.  Supine to sit with Modified [place].  Stand pivot transfers with Moderate Assistance.  Upper extremity exercise program x10 minutes, with supervision.  Caregiver will be educated on,level of assist required to  safely perform self care tasks and functional transfers.                     Time Tracking:     OT Date of Treatment: OT Date of Treatment: 12/05/20  OT Total Time (min):      Billable Minutes:Self Care/Home Management 45   Additional 8 minutes in PM   Total Time: 53    AGUILAR Burleson  12/5/2020

## 2020-12-06 LAB
POCT GLUCOSE: 219 MG/DL (ref 70–110)
POCT GLUCOSE: 276 MG/DL (ref 70–110)
POCT GLUCOSE: 319 MG/DL (ref 70–110)
POCT GLUCOSE: 336 MG/DL (ref 70–110)

## 2020-12-06 PROCEDURE — 25000003 PHARM REV CODE 250: Performed by: NURSE PRACTITIONER

## 2020-12-06 PROCEDURE — 11000004 HC SNF PRIVATE

## 2020-12-06 PROCEDURE — 63600175 PHARM REV CODE 636 W HCPCS: Performed by: HOSPITALIST

## 2020-12-06 PROCEDURE — 25000003 PHARM REV CODE 250: Performed by: HOSPITALIST

## 2020-12-06 RX ADMIN — INSULIN ASPART 4 UNITS: 100 INJECTION, SOLUTION INTRAVENOUS; SUBCUTANEOUS at 05:12

## 2020-12-06 RX ADMIN — ATORVASTATIN CALCIUM 40 MG: 20 TABLET, FILM COATED ORAL at 09:12

## 2020-12-06 RX ADMIN — ASPIRIN 81 MG CHEWABLE TABLET 81 MG: 81 TABLET CHEWABLE at 09:12

## 2020-12-06 RX ADMIN — INSULIN ASPART 2 UNITS: 100 INJECTION, SOLUTION INTRAVENOUS; SUBCUTANEOUS at 09:12

## 2020-12-06 RX ADMIN — INSULIN ASPART 3 UNITS: 100 INJECTION, SOLUTION INTRAVENOUS; SUBCUTANEOUS at 12:12

## 2020-12-06 RX ADMIN — HEPARIN SODIUM 7500 UNITS: 5000 INJECTION INTRAVENOUS; SUBCUTANEOUS at 02:12

## 2020-12-06 RX ADMIN — MICONAZOLE NITRATE: 20 OINTMENT TOPICAL at 09:12

## 2020-12-06 RX ADMIN — DICLOFENAC 2 G: 10 GEL TOPICAL at 09:12

## 2020-12-06 RX ADMIN — HEPARIN SODIUM 7500 UNITS: 5000 INJECTION INTRAVENOUS; SUBCUTANEOUS at 09:12

## 2020-12-06 RX ADMIN — CLOPIDOGREL 75 MG: 75 TABLET, FILM COATED ORAL at 09:12

## 2020-12-06 RX ADMIN — MUPIROCIN: 20 OINTMENT TOPICAL at 09:12

## 2020-12-06 RX ADMIN — BUMETANIDE 2 MG: 1 TABLET ORAL at 09:12

## 2020-12-06 RX ADMIN — FLUTICASONE PROPIONATE 100 MCG: 50 SPRAY, METERED NASAL at 09:12

## 2020-12-06 RX ADMIN — HEPARIN SODIUM 7500 UNITS: 5000 INJECTION INTRAVENOUS; SUBCUTANEOUS at 05:12

## 2020-12-06 NOTE — NURSING
Admitted to [facility] on 11/24 with Dx of E coli, UTI with weakness and debility. Alert & oriented. Incont of B&B. Requires SB transfers. LBM on 12/4. Blood glucose controlled with prn ss and scheduled levemier daily and nightly. Wound care in place with dressing changed to bilateral lower extremities daily with healing blisters and mild discoloration. PT/OT in progress and tolerating sessions. Contact isolation in place. Informed to call for assist when help is needed. Call light remains within reach. Bed in lowest position, brakes locked and safety maintained.

## 2020-12-06 NOTE — PT/OT/SLP PROGRESS
Physical Therapy      Patient Name:  Sherin Ortiz   MRN:  [id number]    Patient not seen today secondary to Patient unwilling to participate, Pain. Will follow-up at next scheduled visit per PT POC.    Gretta Loera PTA

## 2020-12-06 NOTE — PLAN OF CARE
Problem: Adult Inpatient Plan of Care  Goal: Plan of Care Review  Outcome: Ongoing, Progressing  Goal: Patient-Specific Goal (Individualization)  Outcome: Ongoing, Progressing  Goal: Absence of Hospital-Acquired Illness or Injury  Outcome: Ongoing, Progressing     Problem: Diabetes Comorbidity  Goal: Blood Glucose Level Within Desired Range  Outcome: Ongoing, Progressing     Problem: Bariatric Environmental Safety  Goal: Safety Maintained with Care  Outcome: Ongoing, Progressing

## 2020-12-07 VITALS
RESPIRATION RATE: 18 BRPM | HEART RATE: 61 BPM | DIASTOLIC BLOOD PRESSURE: 56 MMHG | OXYGEN SATURATION: 97 % | SYSTOLIC BLOOD PRESSURE: 108 MMHG | TEMPERATURE: 98 F

## 2020-12-07 LAB
ANION GAP SERPL CALC-SCNC: 9 MMOL/L (ref 8–16)
BASOPHILS # BLD AUTO: 0.03 K/UL (ref 0–0.2)
BASOPHILS NFR BLD: 0.7 % (ref 0–1.9)
BUN SERPL-MCNC: 44 MG/DL (ref 8–23)
CALCIUM SERPL-MCNC: 9.1 MG/DL (ref 8.7–10.5)
CHLORIDE SERPL-SCNC: 98 MMOL/L (ref 95–110)
CO2 SERPL-SCNC: 30 MMOL/L (ref 23–29)
CREAT SERPL-MCNC: 1.8 MG/DL (ref 0.5–1.4)
DIFFERENTIAL METHOD: ABNORMAL
EOSINOPHIL # BLD AUTO: 0.1 K/UL (ref 0–0.5)
EOSINOPHIL NFR BLD: 2.6 % (ref 0–8)
ERYTHROCYTE [DISTWIDTH] IN BLOOD BY AUTOMATED COUNT: 13.1 % (ref 11.5–14.5)
EST. GFR  (AFRICAN AMERICAN): 30.9 ML/MIN/1.73 M^2
EST. GFR  (NON AFRICAN AMERICAN): 26.8 ML/MIN/1.73 M^2
GLUCOSE SERPL-MCNC: 230 MG/DL (ref 70–110)
HCT VFR BLD AUTO: 32.8 % (ref 37–48.5)
HGB BLD-MCNC: 10.2 G/DL (ref 12–16)
IMM GRANULOCYTES # BLD AUTO: 0.01 K/UL (ref 0–0.04)
IMM GRANULOCYTES NFR BLD AUTO: 0.2 % (ref 0–0.5)
LYMPHOCYTES # BLD AUTO: 1.4 K/UL (ref 1–4.8)
LYMPHOCYTES NFR BLD: 33.5 % (ref 18–48)
MAGNESIUM SERPL-MCNC: 1.9 MG/DL (ref 1.6–2.6)
MCH RBC QN AUTO: 28.7 PG (ref 27–31)
MCHC RBC AUTO-ENTMCNC: 31.1 G/DL (ref 32–36)
MCV RBC AUTO: 92 FL (ref 82–98)
MONOCYTES # BLD AUTO: 0.4 K/UL (ref 0.3–1)
MONOCYTES NFR BLD: 9.8 % (ref 4–15)
NEUTROPHILS # BLD AUTO: 2.3 K/UL (ref 1.8–7.7)
NEUTROPHILS NFR BLD: 53.2 % (ref 38–73)
NRBC BLD-RTO: 0 /100 WBC
PHOSPHATE SERPL-MCNC: 2.9 MG/DL (ref 2.7–4.5)
PLATELET # BLD AUTO: 296 K/UL (ref 150–350)
PMV BLD AUTO: 9.7 FL (ref 9.2–12.9)
POCT GLUCOSE: 199 MG/DL (ref 70–110)
POCT GLUCOSE: 274 MG/DL (ref 70–110)
POCT GLUCOSE: 303 MG/DL (ref 70–110)
POCT GLUCOSE: 364 MG/DL (ref 70–110)
POTASSIUM SERPL-SCNC: 4.3 MMOL/L (ref 3.5–5.1)
RBC # BLD AUTO: 3.56 M/UL (ref 4–5.4)
SODIUM SERPL-SCNC: 137 MMOL/L (ref 136–145)
WBC # BLD AUTO: 4.27 K/UL (ref 3.9–12.7)

## 2020-12-07 PROCEDURE — 63600175 PHARM REV CODE 636 W HCPCS: Performed by: HOSPITALIST

## 2020-12-07 PROCEDURE — 36415 COLL VENOUS BLD VENIPUNCTURE: CPT

## 2020-12-07 PROCEDURE — 80048 BASIC METABOLIC PNL TOTAL CA: CPT

## 2020-12-07 PROCEDURE — 85025 COMPLETE CBC W/AUTO DIFF WBC: CPT

## 2020-12-07 PROCEDURE — 97110 THERAPEUTIC EXERCISES: CPT | Mod: CQ

## 2020-12-07 PROCEDURE — 11000004 HC SNF PRIVATE

## 2020-12-07 PROCEDURE — 25000003 PHARM REV CODE 250: Performed by: NURSE PRACTITIONER

## 2020-12-07 PROCEDURE — 97530 THERAPEUTIC ACTIVITIES: CPT | Mod: CQ

## 2020-12-07 PROCEDURE — 83735 ASSAY OF MAGNESIUM: CPT

## 2020-12-07 PROCEDURE — 84100 ASSAY OF PHOSPHORUS: CPT

## 2020-12-07 PROCEDURE — 97535 SELF CARE MNGMENT TRAINING: CPT | Mod: CO

## 2020-12-07 PROCEDURE — 97110 THERAPEUTIC EXERCISES: CPT | Mod: CO

## 2020-12-07 PROCEDURE — 25000003 PHARM REV CODE 250: Performed by: HOSPITALIST

## 2020-12-07 RX ORDER — BUMETANIDE 1 MG/1
1 TABLET ORAL DAILY
Status: DISCONTINUED | OUTPATIENT
Start: 2020-12-08 | End: 2020-12-10

## 2020-12-07 RX ADMIN — ATORVASTATIN CALCIUM 40 MG: 20 TABLET, FILM COATED ORAL at 08:12

## 2020-12-07 RX ADMIN — MICONAZOLE NITRATE: 20 OINTMENT TOPICAL at 08:12

## 2020-12-07 RX ADMIN — CLOPIDOGREL 75 MG: 75 TABLET, FILM COATED ORAL at 08:12

## 2020-12-07 RX ADMIN — MUPIROCIN: 20 OINTMENT TOPICAL at 08:12

## 2020-12-07 RX ADMIN — HEPARIN SODIUM 7500 UNITS: 5000 INJECTION INTRAVENOUS; SUBCUTANEOUS at 09:12

## 2020-12-07 RX ADMIN — FLUTICASONE PROPIONATE 100 MCG: 50 SPRAY, METERED NASAL at 08:12

## 2020-12-07 RX ADMIN — DICLOFENAC 2 G: 10 GEL TOPICAL at 08:12

## 2020-12-07 RX ADMIN — BUMETANIDE 2 MG: 1 TABLET ORAL at 08:12

## 2020-12-07 RX ADMIN — HEPARIN SODIUM 7500 UNITS: 5000 INJECTION INTRAVENOUS; SUBCUTANEOUS at 05:12

## 2020-12-07 RX ADMIN — HEPARIN SODIUM 7500 UNITS: 5000 INJECTION INTRAVENOUS; SUBCUTANEOUS at 02:12

## 2020-12-07 RX ADMIN — INSULIN ASPART 2 UNITS: 100 INJECTION, SOLUTION INTRAVENOUS; SUBCUTANEOUS at 08:12

## 2020-12-07 RX ADMIN — ACETAMINOPHEN 650 MG: 325 TABLET ORAL at 08:12

## 2020-12-07 RX ADMIN — INSULIN ASPART 5 UNITS: 100 INJECTION, SOLUTION INTRAVENOUS; SUBCUTANEOUS at 05:12

## 2020-12-07 RX ADMIN — ASPIRIN 81 MG CHEWABLE TABLET 81 MG: 81 TABLET CHEWABLE at 08:12

## 2020-12-07 NOTE — PT/OT/SLP PROGRESS
"Occupational Therapy   Treatment    Name: Sherin Ortiz  MRN: [id number]  Admit Date: 11/24/2020  Admitting Diagnosis:  Urinary tract infection without hematuria    General Precautions: Standard, fall   Orthopedic Precautions:N/A   Braces: N/A     Recommendations:     Discharge Recommendations: home with home health  Level of Assistance Recommended at Discharge: 24 hours light assistance for ADL's and homemaking tasks  Discharge Equipment Recommendations:  none  Barriers to discharge:  Decreased caregiver support    Assessment:     Sherin Ortiz is a 75 y.o. female with a medical diagnosis of Urinary tract infection without hematuria .  She presents with the following performance deficits affecting function are weakness, impaired endurance, impaired self care skills, impaired functional mobilty, gait instability, impaired balance, decreased upper extremity function, decreased lower extremity function, decreased safety awareness, pain, decreased ROM. Pt required mod/ min A for sit to stand transfer from EOB on this date with RW and Mod A for SQPT. Pt with limited ROM in RUE 2* to pain. Nurse notified..Pt. participated well with session on this day. Pt. Cooperative with therapy and tolerated treatment well. Pt. Will continue to benefit from continued OT to progress towards goals.    Rehab Potential is good    Activity tolerance:  Good    Plan:     Patient to be seen 5 x/week to address the above listed problems via self-care/home management, therapeutic activities, therapeutic exercises, wheelchair management/training    · Plan of Care Expires: 12/25/20  · Plan of Care Reviewed with: patient    Subjective     Communicated with: Nurse prior to session. '"Good morning!" .    Pain/Comfort:  · Location - Side 1: Right  · Location - Orientation 1: generalized  · Location 1: foot  · Pain Addressed 1: Reposition, Distraction  · Pain Rating Post-Intervention 1: 5/10    Patient's cultural, spiritual, Holiness conflicts " given the current situation:  · no    Objective:     Patient found supine with   upon OT entry to room.    Bed Mobility:    · Patient completed Rolling/Turning to Left with  minimum assistance  · Patient completed Rolling/Turning to Right with minimum assistance  · Patient completed Scooting/Bridging with moderate assistance  · Patient completed Supine to Sit with stand by assistance     Functional Mobility/Transfers:  · Patient completed Sit <> Stand Transfer with moderate/min assistance  with  rolling walker   · Patient completed Bed <> Chair Transfer using Stand Pivot technique with moderate assistance with rolling walker      Activities of Daily Living:  · Grooming: supervision for oral care and brushing of hair at sink level while seated  · Upper Body Dressing: stand by assistance to doff/shana gown  · Lower Body Dressing: maximal assistance to shana pants while lying supine.  · Toileting: maximal assistance for hygiene and diaper management    [facility] 6 Click ADL: 15    OT Exercises: AROM to  R LUE x 2 sets of 10 reps for all major planes of motion without weight: RUE 2 sets 10 reps for elbow flex/ext and scapula adduction and depression x1 set 10 reps.     Treatment & Education:  Pt educated on postioning and there ex to RUE to assist with improving ROM.  Pt educated on safety with transfers and ADL tasks.    Pt treated in room 2* to isolation precautions.    Patient left up in chair with all lines intact and call button in reachEducation:      GOALS:   Multidisciplinary Problems     Occupational Therapy Goals        Problem: Occupational Therapy Goal    Goal Priority Disciplines Outcome Interventions   Occupational Therapy Goal     OT, PT/OT Ongoing, Progressing    Description: Goals to be met by: 12/15/20     Patient will increase functional independence with ADLs by performing:    Feeding with Modified [place].  UE Dressing with Set-up Assistance.  LE Dressing with Moderate Assistance.  Grooming while  seated with Set-up Assistance.  Toileting from bedside commode with Moderate Assistance for hygiene and clothing management.   Bathing from  sitting at sink with Minimal Assistance.  Supine to sit with Modified [place].  Stand pivot transfers with Moderate Assistance.  Upper extremity exercise program x10 minutes, with supervision.  Caregiver will be educated on,level of assist required to safely perform self care tasks and functional transfers.                     Time Tracking:     OT Date of Treatment: OT Date of Treatment: 12/07/20  OT Total Time (min): Total Time (min): 40 min    Billable Minutes:Self Care/Home Management 30   Therapeutic Exercises:10    ZELDA Garcia  12/7/2020

## 2020-12-07 NOTE — PROGRESS NOTES
[facility]                                  Skilled Nursing Facility                   Progress Note     Admit Date: 11/24/2020  GABE 12/15/2020  Principal Problem:  Urinary tract infection without hematuria   HPI obtained from patient interview and chart review     Chief Complaint: Re-evaluation of medical treatment and therapy status: Lab review, hyperglycemia, worsening renal function    HPI:   Mrs. Ortiz is a 75 year old female PMHx of T2DM, polyneuropathy, nephropathy, retinopathy, CKDIII-IV, essential HTN, mixed HLD, history of TIA, and chronic diastolic heart failure who presents to SNF following hospitalization for E coli UTI.  Admission to SNF for secondary weakness and debility.    Interval history: All of today's labs reviewed and are listed below. BUN/creatinine increased to 44/1.8 from 35/1.8  24 hr vital sign ranges listed below.  24 hr blood glucose range is 119-334.  Patient reports continued right shoulder and bilateral knee pain that is mild in controlled.  Patient denies trouble sleeping at night, shortness of breath, abdominal discomfort, nausea, or vomiting.  Patient reports an adequate appetite.  Patient denies dysuria.  Patient reports having regular bowel movements.  Patient progessing with PT/OT- patient is now sit to stand with minimal assistance. Continuing to follow and treat all acute and chronic conditions.    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), Benign essential hypertension (11/14/2012), Cataract, Chronic kidney disease (CKD), stage III (moderate) (9/12/2013), Diabetic peripheral neuropathy associated with type 2 diabetes mellitus (11/14/2014), Gait disorder, Hyperlipidemia, Iritis - Both Eyes (6/10/2013), Kidney stone, Lymphedema, Morbid obesity with BMI of 40.0-44.9, adult (2/18/2015), Nephrolithiasis (4/20/2016), NS (nuclear  sclerosis) (4/1/2013), Nuclear sclerosis - Both Eyes (4/29/2013), Preseptal cellulitis - Right Eye (4/29/2013), Proliferative diabetic retinopathy - Both Eyes (4/29/2013), Proliferative diabetic retinopathy, both eyes (4/1/2013), PSC (posterior subcapsular cataract) - Both Eyes (4/29/2013), S/P hernia repair (12/19/2012), TIA (transient ischemic attack) (11/18/2014), Tinea pedis (7/24/2012), Type 2 diabetes mellitus with renal manifestations, controlled (12/12/2013), Type 2 diabetes, controlled, with moderate nonproliferative diabetic retinopathy without macular edema (9/17/2015), Ulcer of left lower extremity, limited to breakdown of skin (7/8/2015), Unspecified cerebral artery occlusion with cerebral infarction (11/16/2014), Unspecified venous (peripheral) insufficiency, Ureteral stone with hydronephrosis (1/27/2016), UTI (lower urinary tract infection), Vaginal infection, and 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    Past Surgical History: Patient has a past surgical history that includes Appendectomy; Cholecystectomy; Subtotal colectomy (12/13/2012); Eye surgery (Bilateral, 2008); Cataract extraction w/  intraocular lens implant (Left, 5/21/2013); Cataract extraction w/  intraocular lens implant (Right, 6/4/2013); Colonoscopy (12/22/2005); Esophagogastroduodenoscopy (12/21/2015); and Nasal septum surgery.    Social History: Patient reports that she quit smoking about 38 years ago. Her smoking use included cigarettes. She has a 7.50 pack-year smoking history. She has quit using smokeless tobacco. She reports that she does not drink alcohol or use drugs.    Family History: family history includes Cataracts in her brother and sister; Diabetes in her sister; Heart disease in her brother; Leukemia in her mother.    Allergies: Patient is allergic to penicillins and sulfa (sulfonamide antibiotics).    ROS  Constitutional: Negative for fever   Eyes: Negative for blurred vision, double vision   Respiratory:  + for  cough, shortness of breath- at baseline   Cardiovascular: Negative for chest pain, palpitations.  + leg swelling.   Gastrointestinal: Negative for abdominal pain, constipation, diarrhea, nausea, vomiting.   Genitourinary: Negative for dysuria, frequency   Musculoskeletal:  + generalized weakness.  +bilateral knee pain L>R, right shoulder pain  Skin: Negative for itching and rash.   Neurological: Negative for dizziness, headaches.   Psychiatric/Behavioral: Negative for depression. The patient is not nervous/anxious.      24 hour Vital Sign Range   Temp:  [97.5 °F (36.4 °C)-97.6 °F (36.4 °C)]   Pulse:  [63-66]   Resp:  [18]   BP: (120-143)/(60-61)   SpO2:  [95 %-97 %]     PEx  Constitutional: Patient appears debilitated.  No distress noted  HENT:   Head: Normocephalic and atraumatic.   Eyes: Pupils are equal, round  Neck: Normal range of motion. Neck supple.   Cardiovascular: Normal rate, regular rhythm and normal heart sounds.    Pulmonary/Chest: Effort normal and breath sounds are clear  Abdominal: Soft. Bowel sounds are normal.   Musculoskeletal: Normal range of motion.   Neurological: Alert and oriented to person, place, and time.   Psychiatric: Normal mood and affect. Behavior is normal.   Skin: Skin is warm and dry.  +3 pitting edema to bilateral lower extremities    Recent Labs   Lab 12/07/20  0453      K 4.3   CL 98   CO2 30*   BUN 44*   CREATININE 1.8*   MG 1.9       Recent Labs   Lab 12/07/20  0453   WBC 4.27   RBC 3.56*   HGB 10.2*   HCT 32.8*      MCV 92   MCH 28.7   MCHC 31.1*         Assessment and Plan:     Problems addressed today        Uncontrolled T2DM with neuropathy, nephropathy, and retinopathy  - Accu-Cheks AC/HS, diabetic diet  - home regimen with glimepiride 2 mg daily, Lantus 30-35 units qHS.  - unstable, increased detemir to 16 units in a.m. continue 12 units in the p.m., continue low- dose sliding scale insulin     CKDIII  - worsening, decrease Bumex 1 mg daily,  continue to  monitor for now with twice weekly BMPs, avoid nephrotoxic agents and renally dose medications when appropriate    Bilateral knee pain  - patient states that bilaterally pain has resolved, ordered for uric acid level to assess for gout- which did come back elevated at 13.9- will not treat since patient is currently asymptomatic, continue Voltaren gel 2 g BID, can use ice or heat intermittently for comfort, follow-up with orthopedics should problems persist for further management    E coli ESBL UTI  - completed 3 day treatment of IV ertapenem 1g Q 24 hr   - currently denies any urinary symptoms  - per infection Control, patient to remain on isolation precautions at SNF stay due to incontinence    Chronic diastolic heart failure  Essential HTN  - stable, decreased home Bumex to 1 from 2 mg daily    Anemia of chronic renal disease  - stable, continue to monitor on twice weekly CBCs    Venous stasis dermatitis b/l  - follows with wound care on an outpatient basis, [facility] wound care nurse chart check patient and adjusted orders, will see patient on Tues 12/1       Ongoing but stable problems      History of TIA  Mixed HLD  - continue aspirin 81 mg daily, Plavix 75 mg daily, atorvastatin 40 mg qHS.    Morbid obesity  Debility  - weight loss encouraged, dietary consult and appreciate recommendations    Debility   - Continue with PT/OT for gait training and strengthening and restoration of ADL's   - Encourage mobility, OOB in chair, and early ambulation as appropriate  - Fall precautions   - Monitor for bowel and bladder dysfunction  - Monitor for and prevent skin breakdown and pressure ulcers  - Continue DVT prophylaxis with  heparin 7.5 K q.8 hours subcu       Future Appointments   Date Time Provider Department Center   12/10/2020  1:30 PM Claudette Juarez NP [facility] WOUND [facility]   12/28/2020  1:30 PM Ame Vasquez MD [facility] IM [facility] PCPRUDENCE Adler NP  Department of Hospital Medicine   [facility]- Skilled  Nursing Facility     DOS: 12/7/2020       Patient note was created using MModal Dictation.  Any errors in syntax or even information may not have been identified and edited on initial review prior to signing this note.

## 2020-12-07 NOTE — PT/OT/SLP PROGRESS
"Physical Therapy Treatment    Patient Name:  Sherin Ortiz   MRN:  [id number]  Admit Date: 11/24/2020  Admitting Diagnosis: Urinary tract infection without hematuria      General Precautions: Standard, fall, contact   Orthopedic Precautions:N/A   Braces:       Recommendations:     Discharge Recommendations:  home with home health   Level of Assistance Recommended at Discharge: 24 hours significant assistance  Discharge Equipment Recommendations: none   Barriers to discharge: Decreased caregiver support    Assessment:     Sherin Ortiz is a 75 y.o. female admitted with a medical diagnosis of Urinary tract infection without hematuria . Pt tolerated well, pt would continue to benefit from skilled PT services to improve overall functional mobility, strength and endurance.  .      Performance deficits affecting function:  weakness, impaired endurance, impaired self care skills, impaired functional mobilty, gait instability, impaired balance, decreased upper extremity function, decreased lower extremity function, impaired skin, edema .    Rehab Potential is good    Activity Tolerance: Fair    Plan:     Patient to be seen 5 x/week to address the above listed problems via gait training, therapeutic activities, therapeutic exercises    · Plan of Care Expires: 12/25/20  · Plan of Care Reviewed with: patient    Subjective     "alright".     Pain/Comfort:  · Pain Rating 1: 5/10  · Location - Side 1: Right  · Location - Orientation 1: generalized  · Location 1: shoulder  · Pain Addressed 1: Reposition, Distraction, Cessation of Activity(unsure if medicated, declined needing meds)  · Pain Rating Post-Intervention 1: (inc with mobility)    Patient's cultural, spiritual, Caodaism conflicts given the current situation:  · no    Objective:       Patient found up in chair with (in WC) upon PT entry to room. Pt treated in room 2* to isolation prec    Therapeutic Activities and Exercises: 2x10 reps GS,LAQ,hip flex,abd/add mini " elliptical x 10 min    Functional Mobility:  · Bed Mobility:    · Transfers: sit std with bed rail min A  · Balance: static standing FFP with bed rail x 2 trials ~ 25 sec each limited by faitgue    AM-PAC 6 CLICK MOBILITY  13    Patient left up in chair with call button in reach and belongings in reach.    GOALS:   Multidisciplinary Problems     Physical Therapy Goals        Problem: Physical Therapy Goal    Goal Priority Disciplines Outcome Goal Variances Interventions   Physical Therapy Goal     PT, PT/OT Ongoing, Progressing     Description: Goals to be met by: 12/15/20    Patient will increase functional independence with mobility by performing:    . Supine to sit with ModA  . Sit to supine with ModA  . Rolling to Left and Right with Jett  . Sit to stand transfer with ModA  . Bed to chair transfer with ModA using Rolling Walker  . Gait  x 8-10 feet with Minimal Assistance using Rolling Walker/ or in // bars.  (discontinued 12/1/2020)  . Wheelchair propulsion x50 feet with Supervision using bilateral uppper extremities  . Lower extremity exercise program x20-30 reps per handout, with assistance as needed                     Time Tracking:     PT Received On: 12/07/20  PT Total Time (min):       Billable Minutes: Therapeutic Activity 8 and Therapeutic Exercise 15    Treatment Type: Treatment  PT/PTA: PTA     PTA Visit Number: 3     Swetha Parsons PTA  12/07/2020

## 2020-12-07 NOTE — PLAN OF CARE
Problem: Adult Inpatient Plan of Care  Goal: Plan of Care Review  Outcome: Ongoing, Progressing  Goal: Patient-Specific Goal (Individualization)  Outcome: Ongoing, Progressing  Goal: Absence of Hospital-Acquired Illness or Injury  Outcome: Ongoing, Progressing  Goal: Optimal Comfort and Wellbeing  Outcome: Ongoing, Progressing  Goal: Readiness for Transition of Care  Outcome: Ongoing, Progressing     Problem: Diabetes Comorbidity  Goal: Blood Glucose Level Within Desired Range  Outcome: Ongoing, Progressing     Problem: Bariatric Environmental Safety  Goal: Safety Maintained with Care  Outcome: Ongoing, Progressing     Problem: Wound  Goal: Optimal Wound Healing  Outcome: Ongoing, Progressing     Problem: Fall Injury Risk  Goal: Absence of Fall and Fall-Related Injury  Outcome: Ongoing, Progressing     Problem: Skin Injury Risk Increased  Goal: Skin Health and Integrity  Outcome: Ongoing, Progressing     Problem: Infection  Goal: Infection Symptom Resolution  Outcome: Ongoing, Progressing     Problem: Oral Intake Inadequate  Goal: Improved Oral Intake  Outcome: Ongoing, Progressing

## 2020-12-08 LAB
POCT GLUCOSE: 264 MG/DL (ref 70–110)
POCT GLUCOSE: 288 MG/DL (ref 70–110)
POCT GLUCOSE: 295 MG/DL (ref 70–110)
POCT GLUCOSE: 344 MG/DL (ref 70–110)

## 2020-12-08 PROCEDURE — 25000003 PHARM REV CODE 250: Performed by: HOSPITALIST

## 2020-12-08 PROCEDURE — 97535 SELF CARE MNGMENT TRAINING: CPT | Mod: CO

## 2020-12-08 PROCEDURE — 11000004 HC SNF PRIVATE

## 2020-12-08 PROCEDURE — 97110 THERAPEUTIC EXERCISES: CPT | Mod: CQ

## 2020-12-08 PROCEDURE — 25000003 PHARM REV CODE 250: Performed by: NURSE PRACTITIONER

## 2020-12-08 PROCEDURE — 63600175 PHARM REV CODE 636 W HCPCS: Performed by: HOSPITALIST

## 2020-12-08 PROCEDURE — 97530 THERAPEUTIC ACTIVITIES: CPT | Mod: CQ

## 2020-12-08 RX ADMIN — MICONAZOLE NITRATE: 20 OINTMENT TOPICAL at 10:12

## 2020-12-08 RX ADMIN — ASPIRIN 81 MG CHEWABLE TABLET 81 MG: 81 TABLET CHEWABLE at 09:12

## 2020-12-08 RX ADMIN — HEPARIN SODIUM 7500 UNITS: 5000 INJECTION INTRAVENOUS; SUBCUTANEOUS at 10:12

## 2020-12-08 RX ADMIN — INSULIN ASPART 2 UNITS: 100 INJECTION, SOLUTION INTRAVENOUS; SUBCUTANEOUS at 09:12

## 2020-12-08 RX ADMIN — FLUTICASONE PROPIONATE 100 MCG: 50 SPRAY, METERED NASAL at 09:12

## 2020-12-08 RX ADMIN — MUPIROCIN: 20 OINTMENT TOPICAL at 09:12

## 2020-12-08 RX ADMIN — DICLOFENAC 2 G: 10 GEL TOPICAL at 10:12

## 2020-12-08 RX ADMIN — CLOPIDOGREL 75 MG: 75 TABLET, FILM COATED ORAL at 09:12

## 2020-12-08 RX ADMIN — HEPARIN SODIUM 7500 UNITS: 5000 INJECTION INTRAVENOUS; SUBCUTANEOUS at 05:12

## 2020-12-08 RX ADMIN — DICLOFENAC 2 G: 10 GEL TOPICAL at 09:12

## 2020-12-08 RX ADMIN — INSULIN ASPART 3 UNITS: 100 INJECTION, SOLUTION INTRAVENOUS; SUBCUTANEOUS at 05:12

## 2020-12-08 RX ADMIN — INSULIN ASPART 3 UNITS: 100 INJECTION, SOLUTION INTRAVENOUS; SUBCUTANEOUS at 12:12

## 2020-12-08 RX ADMIN — MICONAZOLE NITRATE: 20 OINTMENT TOPICAL at 09:12

## 2020-12-08 RX ADMIN — HEPARIN SODIUM 7500 UNITS: 5000 INJECTION INTRAVENOUS; SUBCUTANEOUS at 02:12

## 2020-12-08 RX ADMIN — BUMETANIDE 1 MG: 1 TABLET ORAL at 09:12

## 2020-12-08 RX ADMIN — ATORVASTATIN CALCIUM 40 MG: 20 TABLET, FILM COATED ORAL at 09:12

## 2020-12-08 RX ADMIN — INSULIN ASPART 3 UNITS: 100 INJECTION, SOLUTION INTRAVENOUS; SUBCUTANEOUS at 09:12

## 2020-12-08 NOTE — PROGRESS NOTES
Wound care follow-up:  The pannus and groin areas are moist, reddened.  The patient states her' brief was too wet and caused irritation.  The nurse put a blue pad in place.'  The skin was cleansed and antifungal ointment applied.    The BLE are edematous, skin is dry, partial thickness skin loss is resolved- dry, shallow, no drainage, no erythema. The BLE have hemosiderin staining to the lower legs and the tops of the feet. Legs cleansed with cleansing cloths, triad ointment to the wound areas to continue the healing process, Sween 24 lotion to the legs/feet --not the toes.  Gauze placed over the Triad, legs wrapped from toes to knees with kerlix then tubigrip G.  Tolerated well.    Recommendations:  Groin/pannus- miconazole ointment BID, no briefs while in bed- use blue pads.   Nursing to continue care, wound care signing off  Please reconsult if needed.   KELSY Ring RN, CN  [id number]      Right lower leg    Right lateral leg

## 2020-12-08 NOTE — PT/OT/SLP PROGRESS
Physical Therapy Treatment    Patient Name:  Sherin Ortiz   MRN:  [id number]  Admit Date: 11/24/2020  Admitting Diagnosis: Urinary tract infection without hematuria    General Precautions: Standard, fall, contact   Orthopedic Precautions:N/A       Recommendations:     Discharge Recommendations:  home with home health   Level of Assistance Recommended at Discharge: 24 hours significant assistance  Discharge Equipment Recommendations: none   Barriers to discharge: Decreased caregiver support    Assessment:     Sherin Ortiz is a 75 y.o. female admitted with a medical diagnosis of Urinary tract infection without hematuria . Pt tolerated treatment session well, and will continue to benefit from PT services at this time. Continue with PT POC as indicated.      Performance deficits affecting function:  weakness, impaired endurance, impaired self care skills, impaired functional mobilty, gait instability, impaired balance, decreased upper extremity function, decreased lower extremity function, impaired skin, edema .    Rehab Potential is good    Activity Tolerance: Fair    Plan:     Patient to be seen 5 x/week to address the above listed problems via gait training, therapeutic activities, therapeutic exercises    · Plan of Care Expires: 12/25/20  · Plan of Care Reviewed with: patient    Subjective     Pt was willing to participate with therapy.     Pain/Comfort:  · Pain Rating 1: 2/10  · Location - Side 1: Right  · Location - Orientation 1: generalized  · Location 1: shoulder  · Pain Addressed 1: Reposition    Patient's cultural, spiritual, Amish conflicts given the current situation:  · no    Objective:     Communicated with nursing prior to session.  Patient found up in chair upon PT entry to room.   **Pt treated in room per SNF contact isolation protocol.     Therapeutic Activities and Exercises:   -Mini-Elliptical x15 min  -BLE therex 2x10 reps: AP,LAQ,Hip flexion,GS,Hip abd/add  -Doffed pants at end of  treatment session.     Functional Mobility:  · Bed Mobility:  Rolling Left:  stand by assistance  · Rolling Right: stand by assistance  · Scooting: stand by assistance  · Sit to Supine: contact guard assistance  · Transfers:  Sit to Stand: to/from w/c x2 trials with minimum assistance with use of bedrail  · Bed to Chair: minimum assistance and moderate assistance with  hand-held assist  using  Stand Pivot  · Balance: Static standing FFP with bed rail x 2 trials ~ 30 sec each limited by rachel    AM-PAC 6 CLICK MOBILITY  13    Patient left supine with call button in reach and nursing notified.    GOALS:   Multidisciplinary Problems     Physical Therapy Goals        Problem: Physical Therapy Goal    Goal Priority Disciplines Outcome Goal Variances Interventions   Physical Therapy Goal     PT, PT/OT Ongoing, Progressing     Description: Goals to be met by: 12/15/20    Patient will increase functional independence with mobility by performing:    . Supine to sit with ModA  . Sit to supine with ModA  . Rolling to Left and Right with Jett  . Sit to stand transfer with ModA  . Bed to chair transfer with ModA using Rolling Walker  . Gait  x 8-10 feet with Minimal Assistance using Rolling Walker/ or in // bars.  (discontinued 12/1/2020)  . Wheelchair propulsion x50 feet with Supervision using bilateral uppper extremities  . Lower extremity exercise program x20-30 reps per handout, with assistance as needed                     Time Tracking:     PT Received On: 12/08/20  PT Total Time (min):   38 minutes    Billable Minutes: Therapeutic Activity 23 and Therapeutic Exercise 15    Treatment Type: Treatment  PT/PTA: PTA     PTA Visit Number: 4     Gretta Loera, PTA  12/08/2020

## 2020-12-08 NOTE — PT/OT/SLP PROGRESS
"Occupational Therapy   Treatment    Name: Sherin Ortiz  MRN: [id number]  Admit Date: 11/24/2020  Admitting Diagnosis:  Urinary tract infection without hematuria    General Precautions: Standard, fall   Orthopedic Precautions:N/A   Braces: N/A     Recommendations:     Discharge Recommendations: home with home health  Level of Assistance Recommended at Discharge: 24 hours light assistance for ADL's and homemaking tasks  Discharge Equipment Recommendations:  none  Barriers to discharge:  Decreased caregiver support    Assessment:     Sherin Ortiz is a 75 y.o. female with a medical diagnosis of Urinary tract infection without hematuria.  She presents with the following performance deficits affecting function are weakness, impaired endurance, impaired self care skills, impaired functional mobilty, gait instability, impaired balance, decreased upper extremity function, decreased lower extremity function, decreased safety awareness, pain, decreased ROM. Pt is progressing with her goals.Pt. participated well with session on this day. Pt. Cooperative with therapy and tolerated treatment well. Pt. Will continue to benefit from continued OT to progress towards goals. Pt treated in room 2* to  Isolation precautions.    Rehab Potential is good    Activity tolerance:  Good    Plan:     Patient to be seen 5 x/week to address the above listed problems via self-care/home management, therapeutic activities, therapeutic exercises, wheelchair management/training    · Plan of Care Expires: 12/25/20  · Plan of Care Reviewed with: patient    Subjective     Communicated with: Nurse prior to session. " Good morning" .    Pain/Comfort:  · Pain Rating 1: 2/10  · Location - Side 1: Right  · Location - Orientation 1: generalized  · Location 1: shoulder  · Pain Addressed 1: Reposition    Patient's cultural, spiritual, Christian conflicts given the current situation:  · no    Objective:     Patient found supine with   upon OT entry to " room.    Bed Mobility:    · Patient completed Rolling/Turning to Left with  minimum assistance  · Patient completed Rolling/Turning to Right with minimum assistance  · Patient completed Scooting/Bridging with moderate assistance  · Patient completed Supine to Sit with stand by assistance     Functional Mobility/Transfers:  · Patient completed Sit <> Stand Transfer with moderate assistance  with  rolling walker   · Patient completed Bed <> Chair Transfer using Stand Pivot technique with moderate assistance with rolling walker  · Patient completed Toilet Transfer Stand Pivot technique with moderate assistance with  grab bars  · Functional Mobility: Not tested    Activities of Daily Living:  · Grooming: supervision to wash face , oral care and combe hair at sink level  · Lower Body Dressing: maximal assistance to shana pants while lying in supune.  · Toileting: maximal assistance for hygiene and diaper management.    [facility] 6 Click ADL: 15    OT Exercises: AROM R UE x 2 sets of 10 reps for all major planes of motion without  any resistance.    Treatment & Education:  Pt performed  2X20 reps of sliding towel exercises of circular motion and lateral motions to improve BUE ROM.    Pt educated on safety with transfers as well as ADL task.    Patient left up in chair with all lines intact and call button in reachEducation:      GOALS:   Multidisciplinary Problems     Occupational Therapy Goals        Problem: Occupational Therapy Goal    Goal Priority Disciplines Outcome Interventions   Occupational Therapy Goal     OT, PT/OT Ongoing, Progressing    Description: Goals to be met by: 12/15/20     Patient will increase functional independence with ADLs by performing:    Feeding with Modified [place].  UE Dressing with Set-up Assistance.  LE Dressing with Moderate Assistance.  Grooming while seated with Set-up Assistance.  Toileting from bedside commode with Moderate Assistance for hygiene and clothing management.   Bathing  from  sitting at sink with Minimal Assistance.  Supine to sit with Modified [place].  Stand pivot transfers with Moderate Assistance.  Upper extremity exercise program x10 minutes, with supervision.  Caregiver will be educated on,level of assist required to safely perform self care tasks and functional transfers.                     Time Tracking:     OT Date of Treatment: OT Date of Treatment: 12/08/20  OT Total Time (min): Total Time (min): 40 min    Billable Minutes:Self Care/Home Management 40    ZELDA Garcia  12/8/2020   OT and SANDOVAL have discussed the above patient goals and status in collaboration with Plan of Care

## 2020-12-09 LAB
POCT GLUCOSE: 215 MG/DL (ref 70–110)
POCT GLUCOSE: 227 MG/DL (ref 70–110)
POCT GLUCOSE: 288 MG/DL (ref 70–110)
POCT GLUCOSE: 332 MG/DL (ref 70–110)

## 2020-12-09 PROCEDURE — 25000003 PHARM REV CODE 250: Performed by: NURSE PRACTITIONER

## 2020-12-09 PROCEDURE — 97535 SELF CARE MNGMENT TRAINING: CPT

## 2020-12-09 PROCEDURE — 11000004 HC SNF PRIVATE

## 2020-12-09 PROCEDURE — 97530 THERAPEUTIC ACTIVITIES: CPT

## 2020-12-09 PROCEDURE — 25000003 PHARM REV CODE 250: Performed by: HOSPITALIST

## 2020-12-09 PROCEDURE — 97110 THERAPEUTIC EXERCISES: CPT

## 2020-12-09 PROCEDURE — 63600175 PHARM REV CODE 636 W HCPCS: Performed by: HOSPITALIST

## 2020-12-09 RX ADMIN — ACETAMINOPHEN 650 MG: 325 TABLET ORAL at 10:12

## 2020-12-09 RX ADMIN — MUPIROCIN: 20 OINTMENT TOPICAL at 09:12

## 2020-12-09 RX ADMIN — FLUTICASONE PROPIONATE 100 MCG: 50 SPRAY, METERED NASAL at 09:12

## 2020-12-09 RX ADMIN — CLOPIDOGREL 75 MG: 75 TABLET, FILM COATED ORAL at 09:12

## 2020-12-09 RX ADMIN — HEPARIN SODIUM 7500 UNITS: 5000 INJECTION INTRAVENOUS; SUBCUTANEOUS at 09:12

## 2020-12-09 RX ADMIN — INSULIN ASPART 2 UNITS: 100 INJECTION, SOLUTION INTRAVENOUS; SUBCUTANEOUS at 04:12

## 2020-12-09 RX ADMIN — HEPARIN SODIUM 7500 UNITS: 5000 INJECTION INTRAVENOUS; SUBCUTANEOUS at 03:12

## 2020-12-09 RX ADMIN — BUMETANIDE 1 MG: 1 TABLET ORAL at 09:12

## 2020-12-09 RX ADMIN — ASPIRIN 81 MG CHEWABLE TABLET 81 MG: 81 TABLET CHEWABLE at 09:12

## 2020-12-09 RX ADMIN — ATORVASTATIN CALCIUM 40 MG: 20 TABLET, FILM COATED ORAL at 09:12

## 2020-12-09 RX ADMIN — DICLOFENAC 2 G: 10 GEL TOPICAL at 09:12

## 2020-12-09 RX ADMIN — MICONAZOLE NITRATE: 20 OINTMENT TOPICAL at 09:12

## 2020-12-09 RX ADMIN — HEPARIN SODIUM 7500 UNITS: 5000 INJECTION INTRAVENOUS; SUBCUTANEOUS at 05:12

## 2020-12-09 RX ADMIN — INSULIN ASPART 4 UNITS: 100 INJECTION, SOLUTION INTRAVENOUS; SUBCUTANEOUS at 11:12

## 2020-12-09 RX ADMIN — INSULIN ASPART 3 UNITS: 100 INJECTION, SOLUTION INTRAVENOUS; SUBCUTANEOUS at 08:12

## 2020-12-09 NOTE — PLAN OF CARE
VEENA spoke with Jazmyn (daughter) to discuss discharge plans. Patient wants to leave Saturday. Jazmyn states they are able to provide 24 hr care at home. Jazmyn will arrive on Saturday at 10:30 am.

## 2020-12-09 NOTE — PLAN OF CARE
Problem: Occupational Therapy Goal  Goal: Occupational Therapy Goal  Description: Goals to be met by: 12/15/20     Patient will increase functional independence with ADLs by performing:    Feeding with Modified [place].   --Met  UE Dressing with Set-up Assistance.  ---Met  LE Dressing with Moderate Assistance.  --Met  Grooming while seated with Set-up Assistance.  Toileting from bedside commode with Moderate Assistance for hygiene and clothing management.  --Met  Bathing from  sitting at sink with Minimal Assistance.  Supine to sit with Modified [place].  Stand pivot transfers with Moderate Assistance. --Met  Upper extremity exercise program x10 minutes, with supervision.  Caregiver will be educated on,level of assist required to safely perform self care tasks and functional transfers.    Outcome: Ongoing, Progressing

## 2020-12-09 NOTE — PLAN OF CARE
Pt to remain free from falls. Monitor glucose. Skin integrity, wound care, incontinence care. PT/OT. Pain control.

## 2020-12-09 NOTE — PT/OT/SLP PROGRESS
"Physical Therapy Treatment    Patient Name:  Sherin Ortiz   MRN:  [id number]  Admit Date: 11/24/2020  Admitting Diagnosis: Urinary tract infection without hematuria  Recent Surgeries: none    General Precautions: Standard, fall, contact (session completed in room due to isolation status)  Orthopedic Precautions:N/A   Braces:       Recommendations:     Discharge Recommendations:  home with home health   Level of Assistance Recommended at Discharge: 24 hours light assistance  Discharge Equipment Recommendations: none   Barriers to discharge: Decreased caregiver support    Assessment:     Sherin Ortiz is a 75 y.o. female admitted with a medical diagnosis of Urinary tract infection without hematuria . Pt margairto session well w/ good participation. She is making good progress w/ mobility including bed mobility, transfers, and standing balance. Pt is at a Jett level overall. She will continue PT POC.      Performance deficits affecting function:  weakness, impaired endurance, impaired self care skills, impaired functional mobilty, gait instability, impaired balance, decreased upper extremity function, decreased lower extremity function, impaired skin, edema .    Rehab Potential is good    Activity Tolerance: Good    Plan:     Patient to be seen 5 x/week to address the above listed problems via gait training, therapeutic activities, therapeutic exercises    · Plan of Care Expires: 12/25/20  · Plan of Care Reviewed with: patient    Subjective     Pt agreeable to session. "I want to go home.".     Pain/Comfort:  · Pain Rating 1: 0/10  · Pain Rating Post-Intervention 1: 0/10    Patient's cultural, spiritual, Jewish conflicts given the current situation:  · no    Objective:     Communicated with patient prior to session.  Patient found supine with   upon PT entry to room.     Therapeutic Activities and Exercises:   Seated BLE therex 2x15 reps (GS, HF, LAQ, AP w/ AAROM)  Cues for form and full ROM    Functional " Mobility:  · Bed Mobility:    · Roll left/right: on bed w/ SBA  · Supine>sit on bed w/ SBA  · HOB flat but pt using side rails  · Transfers:    · Sit<>Stand to/from w/c, x3 trials, w/ bed rail and CGA to rise  · Stand pivot EOB>w/c w/ RW and Jett to rise and pivot  · Cues for transfer techniques/safety  · Balance:   · Static stand x3 trials (40s, 45s, and 45s) w/ BUE on bed rail and CGA for safety  · FFP  · Limited in duration by fatigue and LLE weakness    Pt declined to work on w/c propulsion. Goal was discussed w/ pt and is now discontinued.     AM-PAC 6 CLICK MOBILITY  14    Patient left up in chair with call button in reach.    GOALS:   Multidisciplinary Problems     Physical Therapy Goals        Problem: Physical Therapy Goal    Goal Priority Disciplines Outcome Goal Variances Interventions   Physical Therapy Goal     PT, PT/OT Ongoing, Progressing     Description: Goals to be met by: 12/15/20    Patient will increase functional independence with mobility by performing:    . Supine to sit with ModA- Met 12/9/2020  Revised: Supine>sit w/ SPV  . Sit to supine with ModA- Met 12/9/2020  Revised: Sit>supine w/ SPV  . Rolling to Left and Right with Jett- Met 12/9/2020  . Sit to stand transfer with ModA- Met 12/9/2020  Revised sit>stand w/ bedrail/grab bar and SBA  . Bed to chair transfer with ModA using Rolling Walker- met 12/9/2020  Revised: Bed to chair transfer w/ SBA w/ or w/o RW  . Gait  x 8-10 feet with Minimal Assistance using Rolling Walker/ or in // bars.  (discontinued 12/1/2020)  . Wheelchair propulsion x50 feet with Supervision using bilateral uppper extremities (discontinued 12/9/2020- pt reports she does not want to work on w/c propulsion at [facility])  . Lower extremity exercise program x20-30 reps per handout, with assistance as needed  . Stand x1min w/ bed rail/grab bar and SBA                     Time Tracking:     PT Received On: 12/09/20  PT Total Time (min):   38    Billable Minutes: Therapeutic  Activity 23, Therapeutic Exercise 15 and Total Time 38    Treatment Type: Treatment  PT/PTA: PT     PTA Visit Number: 0     Kriss Mendoza, VENKATESH  12/09/2020

## 2020-12-09 NOTE — PLAN OF CARE
Problem: Adult Inpatient Plan of Care  Goal: Plan of Care Review  Outcome: Ongoing, Progressing     Problem: Adult Inpatient Plan of Care  Goal: Patient-Specific Goal (Individualization)  Outcome: Ongoing, Progressing     Problem: Adult Inpatient Plan of Care  Goal: Absence of Hospital-Acquired Illness or Injury  Outcome: Ongoing, Progressing     Problem: Adult Inpatient Plan of Care  Goal: Optimal Comfort and Wellbeing  Outcome: Ongoing, Progressing     Problem: Diabetes Comorbidity  Goal: Blood Glucose Level Within Desired Range  Outcome: Ongoing, Progressing     Problem: Bariatric Environmental Safety  Goal: Safety Maintained with Care  Outcome: Ongoing, Progressing     Problem: Wound  Goal: Optimal Wound Healing  Outcome: Ongoing, Progressing     Problem: Fall Injury Risk  Goal: Absence of Fall and Fall-Related Injury  Outcome: Ongoing, Progressing     Problem: Oral Intake Inadequate  Goal: Improved Oral Intake  Outcome: Ongoing, Progressing     Problem: Infection  Goal: Infection Symptom Resolution  Outcome: Ongoing, Progressing

## 2020-12-09 NOTE — PLAN OF CARE
Goals were revised due to pt progress. She will continue PT POC.  Kriss Mendoza, PT  12/9/2020    Problem: Physical Therapy Goal  Goal: Physical Therapy Goal  Description: Goals to be met by: 12/15/20    Patient will increase functional independence with mobility by performing:    . Supine to sit with ModA- Met 12/9/2020  Revised: Supine>sit w/ SPV  . Sit to supine with ModA- Met 12/9/2020  Revised: Sit>supine w/ SPV  . Rolling to Left and Right with Jett- Met 12/9/2020  . Sit to stand transfer with ModA- Met 12/9/2020  Revised sit>stand w/ bedrail/grab bar and SBA  . Bed to chair transfer with ModA using Rolling Walker- met 12/9/2020  Revised: Bed to chair transfer w/ SBA w/ or w/o RW  . Gait  x 8-10 feet with Minimal Assistance using Rolling Walker/ or in // bars.  (discontinued 12/1/2020)  . Wheelchair propulsion x50 feet with Supervision using bilateral uppper extremities (discontinued 12/9/2020- pt reports she does not want to work on w/c propulsion at [facility])  . Lower extremity exercise program x20-30 reps per handout, with assistance as needed  . Stand x1min w/ bed rail/grab bar and SBA    Outcome: Ongoing, Progressing

## 2020-12-09 NOTE — PT/OT/SLP PROGRESS
Occupational Therapy   Treatment    Name: Sherin Ortiz  MRN: [id number]  Admitting Diagnosis:  Urinary tract infection without hematuria       Recommendations:     Discharge Recommendations: home with home health  Discharge Equipment Recommendations:  none  Barriers to discharge:  Decreased caregiver support    Assessment:     Sherin Ortiz is a 75 y.o. female with a medical diagnosis of Urinary tract infection without hematuria.  She remains limited in performance of self-care and ADLs at PLOF 2*  . Pt indicating that she is ready to go home.  Performance deficits affecting function are weakness, impaired endurance, impaired self care skills, impaired functional mobilty, gait instability, impaired balance, decreased lower extremity function, decreased upper extremity function, decreased safety awareness, pain, decreased ROM, impaired skin. Pt will require Min assist in home setting with HHOT/PT recommended.    Rehab Prognosis:  Good; patient would benefit from acute skilled OT services to address these deficits and reach maximum level of function.       Plan:     Patient to be seen 5 x/week to address the above listed problems via self-care/home management, therapeutic activities, therapeutic exercises, wheelchair management/training  · Plan of Care Expires: 12/25/20  · Plan of Care Reviewed with: patient    Subjective     Pain/Comfort:  Pain Rating 1: 0/10  Pain Rating Post-Intervention 1: 0/10    Objective:     Communicated with: nurse prior to session.  Patient found up in chair with (seated in W/C) upon OT entry to room.    General Precautions: Standard, fall   Orthopedic Precautions:N/A   Braces: N/A     Occupational Performance:     Bed Mobility:    · Patient completed Rolling/Turning to Left with  modified independence  · Patient completed Rolling/Turning to Right with modified independence  · Patient completed Scooting/Bridging with minimum assistance  · Patient completed Supine to Sit with minimum  assistance  · Patient completed Sit to Supine with moderate assistance to lift legs into bed.    Functional Mobility/Transfers:  · Patient completed Sit <> Stand Transfer with contact guard assistance  with  rolling walker   · Patient completed Bed <> Chair Transfer using Stand Pivot technique with contact guard assistance with rolling walker  · Patient completed Toilet Transfer Stand Pivot technique with contact guard assistance with  rolling walker    Activities of Daily Living:  · Grooming: stand by assistance seated sinkside  · Upper Body Dressing: supervision doning/doffing pullover shirt.  · Lower Body Dressing: minimum assistance with hip kit recommended to don socks and shoes.  · Toileting: minimum assistance with (A) to clean rear. Pt pulls pants up/down and cleans front ashley area. Toileting performed from [facility] with RW to stand.      [facility] 6 Click ADL: 19    Treatment & Education:  Pt edu on role of OT, POC, safety when performing self care tasks , benefit of performing OOB activity, and safety when performing functional transfers and mobility.  - White board updated  - Self care tasks completed-- as noted above     Patient left HOB elevated with call button in reachEducation:      GOALS:   Multidisciplinary Problems     Occupational Therapy Goals        Problem: Occupational Therapy Goal    Goal Priority Disciplines Outcome Interventions   Occupational Therapy Goal     OT, PT/OT Ongoing, Progressing    Description: Goals to be met by: 12/15/20     Patient will increase functional independence with ADLs by performing:    Feeding with Modified [place].   --Met  UE Dressing with Set-up Assistance.  ---Met  LE Dressing with Moderate Assistance.  --Met  Grooming while seated with Set-up Assistance.  Toileting from bedside commode with Moderate Assistance for hygiene and clothing management.  --Met  Bathing from  sitting at sink with Minimal Assistance.  Supine to sit with Modified [place].  Stand pivot  transfers with Moderate Assistance. --Met  Upper extremity exercise program x10 minutes, with supervision.  Caregiver will be educated on,level of assist required to safely perform self care tasks and functional transfers.                     Time Tracking:     OT Date of Treatment: 12/09/20  OT Total Time (min):48 min     Billable Minutes:Self Care/Home Management 38  Therapeutic Activity 10    VERONICA Ely/LEX  12/9/2020

## 2020-12-09 NOTE — PLAN OF CARE
CM spoke with patient during IDT. Attempted to call Jazmyn (daughter). No answer. Left voicemail. Patient currently needs 24 hr assistance. Recommended discharge date is 12/15. Patient states she wants to leave either Friday or Saturday. CM will continue to follow up.     IDT Participants:    Lizz Hanks,   Ciara Sprague, OT Supervisor  Inés Kearney,   Ana Herring RD

## 2020-12-10 ENCOUNTER — LAB VISIT (OUTPATIENT)
Dept: LAB | Facility: OTHER | Age: 75
End: 2020-12-10
Payer: MEDICARE

## 2020-12-10 DIAGNOSIS — Z03.818 ENCOUNTER FOR OBSERVATION FOR SUSPECTED EXPOSURE TO OTHER BIOLOGICAL AGENTS RULED OUT: ICD-10-CM

## 2020-12-10 LAB
ANION GAP SERPL CALC-SCNC: 12 MMOL/L (ref 8–16)
BASOPHILS # BLD AUTO: 0.05 K/UL (ref 0–0.2)
BASOPHILS NFR BLD: 1.3 % (ref 0–1.9)
BUN SERPL-MCNC: 60 MG/DL (ref 8–23)
CALCIUM SERPL-MCNC: 9.2 MG/DL (ref 8.7–10.5)
CHLORIDE SERPL-SCNC: 99 MMOL/L (ref 95–110)
CO2 SERPL-SCNC: 29 MMOL/L (ref 23–29)
CREAT SERPL-MCNC: 2.1 MG/DL (ref 0.5–1.4)
DIFFERENTIAL METHOD: ABNORMAL
EOSINOPHIL # BLD AUTO: 0.1 K/UL (ref 0–0.5)
EOSINOPHIL NFR BLD: 1.8 % (ref 0–8)
ERYTHROCYTE [DISTWIDTH] IN BLOOD BY AUTOMATED COUNT: 13.8 % (ref 11.5–14.5)
EST. GFR  (AFRICAN AMERICAN): 25.6 ML/MIN/1.73 M^2
EST. GFR  (NON AFRICAN AMERICAN): 22.2 ML/MIN/1.73 M^2
GLUCOSE SERPL-MCNC: 207 MG/DL (ref 70–110)
HCT VFR BLD AUTO: 32.5 % (ref 37–48.5)
HGB BLD-MCNC: 10 G/DL (ref 12–16)
IMM GRANULOCYTES # BLD AUTO: 0.01 K/UL (ref 0–0.04)
IMM GRANULOCYTES NFR BLD AUTO: 0.3 % (ref 0–0.5)
LYMPHOCYTES # BLD AUTO: 1.6 K/UL (ref 1–4.8)
LYMPHOCYTES NFR BLD: 42.3 % (ref 18–48)
MAGNESIUM SERPL-MCNC: 2.1 MG/DL (ref 1.6–2.6)
MCH RBC QN AUTO: 28.4 PG (ref 27–31)
MCHC RBC AUTO-ENTMCNC: 30.8 G/DL (ref 32–36)
MCV RBC AUTO: 92 FL (ref 82–98)
MONOCYTES # BLD AUTO: 0.4 K/UL (ref 0.3–1)
MONOCYTES NFR BLD: 10.2 % (ref 4–15)
NEUTROPHILS # BLD AUTO: 1.7 K/UL (ref 1.8–7.7)
NEUTROPHILS NFR BLD: 44.1 % (ref 38–73)
NRBC BLD-RTO: 0 /100 WBC
PHOSPHATE SERPL-MCNC: 3.6 MG/DL (ref 2.7–4.5)
PLATELET # BLD AUTO: 284 K/UL (ref 150–350)
PMV BLD AUTO: 10.3 FL (ref 9.2–12.9)
POCT GLUCOSE: 196 MG/DL (ref 70–110)
POCT GLUCOSE: 213 MG/DL (ref 70–110)
POCT GLUCOSE: 228 MG/DL (ref 70–110)
POCT GLUCOSE: 254 MG/DL (ref 70–110)
POTASSIUM SERPL-SCNC: 4.5 MMOL/L (ref 3.5–5.1)
RBC # BLD AUTO: 3.52 M/UL (ref 4–5.4)
SODIUM SERPL-SCNC: 140 MMOL/L (ref 136–145)
WBC # BLD AUTO: 3.83 K/UL (ref 3.9–12.7)

## 2020-12-10 PROCEDURE — 11000004 HC SNF PRIVATE

## 2020-12-10 PROCEDURE — 85025 COMPLETE CBC W/AUTO DIFF WBC: CPT

## 2020-12-10 PROCEDURE — 83735 ASSAY OF MAGNESIUM: CPT

## 2020-12-10 PROCEDURE — 84100 ASSAY OF PHOSPHORUS: CPT

## 2020-12-10 PROCEDURE — 97535 SELF CARE MNGMENT TRAINING: CPT | Mod: CO

## 2020-12-10 PROCEDURE — 25000003 PHARM REV CODE 250: Performed by: NURSE PRACTITIONER

## 2020-12-10 PROCEDURE — 97110 THERAPEUTIC EXERCISES: CPT | Mod: CQ

## 2020-12-10 PROCEDURE — 97530 THERAPEUTIC ACTIVITIES: CPT | Mod: CO

## 2020-12-10 PROCEDURE — 25000003 PHARM REV CODE 250: Performed by: HOSPITALIST

## 2020-12-10 PROCEDURE — 80048 BASIC METABOLIC PNL TOTAL CA: CPT

## 2020-12-10 PROCEDURE — 63600175 PHARM REV CODE 636 W HCPCS: Performed by: HOSPITALIST

## 2020-12-10 PROCEDURE — 36415 COLL VENOUS BLD VENIPUNCTURE: CPT

## 2020-12-10 PROCEDURE — 97530 THERAPEUTIC ACTIVITIES: CPT | Mod: CQ

## 2020-12-10 PROCEDURE — 97803 MED NUTRITION INDIV SUBSEQ: CPT

## 2020-12-10 PROCEDURE — U0003 INFECTIOUS AGENT DETECTION BY NUCLEIC ACID (DNA OR RNA); SEVERE ACUTE RESPIRATORY SYNDROME CORONAVIRUS 2 (SARS-COV-2) (CORONAVIRUS DISEASE [COVID-19]), AMPLIFIED PROBE TECHNIQUE, MAKING USE OF HIGH THROUGHPUT TECHNOLOGIES AS DESCRIBED BY CMS-2020-01-R: HCPCS

## 2020-12-10 RX ORDER — ACETAMINOPHEN 325 MG/1
650 TABLET ORAL EVERY 6 HOURS PRN
Refills: 0 | COMMUNITY
Start: 2020-12-10 | End: 2022-07-13 | Stop reason: DRUGHIGH

## 2020-12-10 RX ORDER — BUMETANIDE 1 MG/1
2 TABLET ORAL DAILY
Status: DISCONTINUED | OUTPATIENT
Start: 2020-12-11 | End: 2020-12-12 | Stop reason: HOSPADM

## 2020-12-10 RX ADMIN — CLOPIDOGREL 75 MG: 75 TABLET, FILM COATED ORAL at 08:12

## 2020-12-10 RX ADMIN — HEPARIN SODIUM 7500 UNITS: 5000 INJECTION INTRAVENOUS; SUBCUTANEOUS at 09:12

## 2020-12-10 RX ADMIN — DICLOFENAC 2 G: 10 GEL TOPICAL at 08:12

## 2020-12-10 RX ADMIN — FLUTICASONE PROPIONATE 100 MCG: 50 SPRAY, METERED NASAL at 08:12

## 2020-12-10 RX ADMIN — MICONAZOLE NITRATE: 20 OINTMENT TOPICAL at 08:12

## 2020-12-10 RX ADMIN — DICLOFENAC 2 G: 10 GEL TOPICAL at 09:12

## 2020-12-10 RX ADMIN — ASPIRIN 81 MG CHEWABLE TABLET 81 MG: 81 TABLET CHEWABLE at 08:12

## 2020-12-10 RX ADMIN — BUMETANIDE 1 MG: 1 TABLET ORAL at 08:12

## 2020-12-10 RX ADMIN — ATORVASTATIN CALCIUM 40 MG: 20 TABLET, FILM COATED ORAL at 09:12

## 2020-12-10 RX ADMIN — INSULIN ASPART 3 UNITS: 100 INJECTION, SOLUTION INTRAVENOUS; SUBCUTANEOUS at 04:12

## 2020-12-10 RX ADMIN — MICONAZOLE NITRATE: 20 OINTMENT TOPICAL at 09:12

## 2020-12-10 RX ADMIN — HEPARIN SODIUM 7500 UNITS: 5000 INJECTION INTRAVENOUS; SUBCUTANEOUS at 05:12

## 2020-12-10 RX ADMIN — INSULIN ASPART 1 UNITS: 100 INJECTION, SOLUTION INTRAVENOUS; SUBCUTANEOUS at 09:12

## 2020-12-10 RX ADMIN — HEPARIN SODIUM 7500 UNITS: 5000 INJECTION INTRAVENOUS; SUBCUTANEOUS at 02:12

## 2020-12-10 RX ADMIN — INSULIN ASPART 2 UNITS: 100 INJECTION, SOLUTION INTRAVENOUS; SUBCUTANEOUS at 08:12

## 2020-12-10 NOTE — PT/OT/SLP PROGRESS
Physical Therapy Treatment    Patient Name:  Sherin Ortiz   MRN:  [id number]  Admit Date: 11/24/2020  Admitting Diagnosis: Urinary tract infection without hematuria    General Precautions: Standard, fall, contact   Orthopedic Precautions:N/A     Recommendations:     Discharge Recommendations:  home with home health   Level of Assistance Recommended at Discharge: 24 hours light assistance  Discharge Equipment Recommendations: none   Barriers to discharge: Decreased caregiver support    Assessment:     Sherin Ortiz is a 75 y.o. female admitted with a medical diagnosis of Urinary tract infection without hematuria . PT tolerated treatment well, and will continue to benefit from PT services at this time to improve all deficits noted below. Continue with PT POC as indicated.      Performance deficits affecting function:  weakness, impaired endurance, impaired self care skills, impaired functional mobilty, gait instability, impaired balance, decreased upper extremity function, decreased lower extremity function, impaired skin, edema .    Rehab Potential is good    Activity Tolerance: Good    Plan:     Patient to be seen 5 x/week to address the above listed problems via gait training, therapeutic activities, therapeutic exercises    · Plan of Care Expires: 12/25/20  · Plan of Care Reviewed with: patient    Subjective     Pt was willing to participate with therapy.     Pain/Comfort:  · Pain Rating 1: 0/10  · Pain Rating Post-Intervention 1: 0/10    Patient's cultural, spiritual, Spiritism conflicts given the current situation:  · no    Objective:     Communicated with nursing prior to session.  Patient found up in chair upon PT entry to room. Pt treated in room 2* to isolation precautions.    Therapeutic Activities and Exercises:   -BLE therex 3x10 reps: AP,LAQ,Hip flexion,GS,Hip abd/add  -Recumbent stepper x15 min   -Doffed pants at end of treatment session.    Functional Mobility:  · Bed Mobility:     · Sit to  Supine: stand by assistance  · Transfers:  Sit to Stand: to/from w/c, x4 trials, w/ bed rail and CGA to rise  · Bed to Chair: minimum assistance with  rolling walker  using  Stand Pivot    AM-PAC 6 CLICK MOBILITY  14    Patient left supine with call button in reach.    GOALS:   Multidisciplinary Problems     Physical Therapy Goals        Problem: Physical Therapy Goal    Goal Priority Disciplines Outcome Goal Variances Interventions   Physical Therapy Goal     PT, PT/OT Ongoing, Progressing     Description: Goals to be met by: 12/15/20    Patient will increase functional independence with mobility by performing:    . Supine to sit with ModA- Met 12/9/2020  Revised: Supine>sit w/ SPV  . Sit to supine with ModA- Met 12/9/2020  Revised: Sit>supine w/ SPV  . Rolling to Left and Right with Jett- Met 12/9/2020  . Sit to stand transfer with ModA- Met 12/9/2020  Revised sit>stand w/ bedrail/grab bar and SBA  . Bed to chair transfer with ModA using Rolling Walker- met 12/9/2020  Revised: Bed to chair transfer w/ SBA w/ or w/o RW  . Gait  x 8-10 feet with Minimal Assistance using Rolling Walker/ or in // bars.  (discontinued 12/1/2020)  . Wheelchair propulsion x50 feet with Supervision using bilateral uppper extremities (discontinued 12/9/2020- pt reports she does not want to work on w/c propulsion at [facility])  . Lower extremity exercise program x20-30 reps per handout, with assistance as needed  . Stand x1min w/ bed rail/grab bar and SBA                     Time Tracking:     PT Received On: 12/10/20  PT Total Time (min):       Billable Minutes: Therapeutic Activity 25 and Therapeutic Exercise 13    Treatment Type: Treatment  PT/PTA: PTA     PTA Visit Number: 1     Gretta Loera, PTA  12/10/2020

## 2020-12-10 NOTE — PROGRESS NOTES
[facility]                                  Skilled Nursing Facility                   Progress Note     Admit Date: 11/24/2020  GABE 12/12/2020  Principal Problem:  Urinary tract infection without hematuria   HPI obtained from patient interview and chart review     Chief Complaint: Re-evaluation of medical treatment and therapy status: Lab review, hyperglycemia, worsening renal function    HPI:   Mrs. Ortiz is a 75 year old female PMHx of T2DM, polyneuropathy, nephropathy, retinopathy, CKDIII-IV, essential HTN, mixed HLD, history of TIA, and chronic diastolic heart failure who presents to SNF following hospitalization for E coli UTI.  Admission to SNF for secondary weakness and debility.    Interval history: All of today's labs reviewed and are listed below. BUN/creatinine increased to 60/2.1 from 44/1.8 after Bumex dose was cut in half to 1 mg daily.  24 hr vital sign ranges listed below.  24 hr blood glucose range is 196- 332.  Patient reports continued right shoulder and bilateral knee pain that is mild in controlled.  Patient denies trouble sleeping at night, shortness of breath, abdominal discomfort, nausea, or vomiting.  Patient reports an adequate appetite.  Patient denies dysuria.  Patient reports having regular bowel movements.  Patient progessing with PT/OT- patient i back to only requiring minimal assistance with transfers, therapy states that patient is progressing closer back to her baseline.  Continuing to follow and treat all acute and chronic conditions.    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), Benign essential hypertension (11/14/2012), Cataract, Chronic kidney disease (CKD), stage III (moderate) (9/12/2013), Diabetic peripheral neuropathy associated with type 2 diabetes mellitus (11/14/2014), Gait disorder, Hyperlipidemia, Iritis - Both Eyes  (6/10/2013), Kidney stone, Lymphedema, Morbid obesity with BMI of 40.0-44.9, adult (2/18/2015), Nephrolithiasis (4/20/2016), NS (nuclear sclerosis) (4/1/2013), Nuclear sclerosis - Both Eyes (4/29/2013), Preseptal cellulitis - Right Eye (4/29/2013), Proliferative diabetic retinopathy - Both Eyes (4/29/2013), Proliferative diabetic retinopathy, both eyes (4/1/2013), PSC (posterior subcapsular cataract) - Both Eyes (4/29/2013), S/P hernia repair (12/19/2012), TIA (transient ischemic attack) (11/18/2014), Tinea pedis (7/24/2012), Type 2 diabetes mellitus with renal manifestations, controlled (12/12/2013), Type 2 diabetes, controlled, with moderate nonproliferative diabetic retinopathy without macular edema (9/17/2015), Ulcer of left lower extremity, limited to breakdown of skin (7/8/2015), Unspecified cerebral artery occlusion with cerebral infarction (11/16/2014), Unspecified venous (peripheral) insufficiency, Ureteral stone with hydronephrosis (1/27/2016), UTI (lower urinary tract infection), Vaginal infection, and 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    Past Surgical History: Patient has a past surgical history that includes Appendectomy; Cholecystectomy; Subtotal colectomy (12/13/2012); Eye surgery (Bilateral, 2008); Cataract extraction w/  intraocular lens implant (Left, 5/21/2013); Cataract extraction w/  intraocular lens implant (Right, 6/4/2013); Colonoscopy (12/22/2005); Esophagogastroduodenoscopy (12/21/2015); and Nasal septum surgery.    Social History: Patient reports that she quit smoking about 38 years ago. Her smoking use included cigarettes. She has a 7.50 pack-year smoking history. She has quit using smokeless tobacco. She reports that she does not drink alcohol or use drugs.    Family History: family history includes Cataracts in her brother and sister; Diabetes in her sister; Heart disease in her brother; Leukemia in her mother.    Allergies: Patient is allergic to penicillins and sulfa  (sulfonamide antibiotics).    ROS  Constitutional: Negative for fever   Eyes: Negative for blurred vision, double vision   Respiratory:  + for cough, shortness of breath- at baseline   Cardiovascular: Negative for chest pain, palpitations.  + leg swelling.   Gastrointestinal: Negative for abdominal pain, constipation, diarrhea, nausea, vomiting.   Genitourinary: Negative for dysuria, frequency   Musculoskeletal:  + generalized weakness.  +bilateral knee pain L>R, right shoulder pain  Skin: Negative for itching and rash.   Neurological: Negative for dizziness, headaches.   Psychiatric/Behavioral: Negative for depression. The patient is not nervous/anxious.      24 hour Vital Sign Range   Temp:  [97.4 °F (36.3 °C)-97.9 °F (36.6 °C)]   Pulse:  [67-68]   Resp:  [18]   BP: (128-134)/(60)   SpO2:  [97 %-99 %]     PEx  Constitutional: Patient appears debilitated.  No distress noted  HENT:   Head: Normocephalic and atraumatic.   Eyes: Pupils are equal, round  Neck: Normal range of motion. Neck supple.   Cardiovascular: Normal rate, regular rhythm and normal heart sounds.    Pulmonary/Chest: Effort normal and breath sounds are clear  Abdominal: Soft. Bowel sounds are normal.   Musculoskeletal: Normal range of motion.   Neurological: Alert and oriented to person, place, and time.   Psychiatric: Normal mood and affect. Behavior is normal.   Skin: Skin is warm and dry.  +3 pitting edema to bilateral lower extremities    Recent Labs   Lab 12/10/20  0501      K 4.5   CL 99   CO2 29   BUN 60*   CREATININE 2.1*   MG 2.1       Recent Labs   Lab 12/10/20  0501   WBC 3.83*   RBC 3.52*   HGB 10.0*   HCT 32.5*      MCV 92   MCH 28.4   MCHC 30.8*         Assessment and Plan:     Problems addressed today      Uncontrolled T2DM with neuropathy, nephropathy, and retinopathy  - Accu-Cheks AC/HS, diabetic diet  - home regimen with glimepiride 2 mg daily, Lantus 30-35 units qHS.  - unstable, increased detemir to 16 units in a.m.  continue 14 units in the p.m., continue low- dose sliding scale insulin.  Patient to follow-up with PCP/endocrinology after discharge from [facility]    CKDIII  - worsening, placed Bumex back to 2 mg daily, place ambulatory consult for Nephrology patient to follow-appointment scheduled for 12/14- patient states that she needs 2 weeks and advanced to arrange for transportation to appointments- appointment to be moved 3 weeks after scheduled appointment, continue to monitor for now with twice weekly BMPs, avoid nephrotoxic agents and renally dose medications when appropriate    Bilateral knee pain  - patient states that bilaterally pain has resolved, ordered for uric acid level to assess for gout- which did come back elevated at 13.9- will not treat since patient is currently asymptomatic, continue Voltaren gel 2 g BID, can use ice or heat intermittently for comfort, follow-up with orthopedics should problems persist for further management    Chronic diastolic heart failure  Essential HTN  - stable, continue home Bumex  2 mg daily    Anemia of chronic renal disease  - stable, continue to monitor on twice weekly CBCs    Venous stasis dermatitis b/l  - follows with wound care on an outpatient basis, [facility] wound care nurse chart check patient and adjusted orders, will see patient on Tues 12/1       Ongoing but stable problems      History of TIA  Mixed HLD  - continue aspirin 81 mg daily, Plavix 75 mg daily, atorvastatin 40 mg qHS.    Morbid obesity  Debility  - weight loss encouraged, dietary consult and appreciate recommendations    Debility   - Continue with PT/OT for gait training and strengthening and restoration of ADL's   - Encourage mobility, OOB in chair, and early ambulation as appropriate  - Fall precautions   - Monitor for bowel and bladder dysfunction  - Monitor for and prevent skin breakdown and pressure ulcers  - Continue DVT prophylaxis with  heparin 7.5 K q.8 hours subcu      E coli ESBL UTI  - completed 3 day  treatment of IV ertapenem 1g Q 24 hr   - currently denies any urinary symptoms  - per infection Control, patient to remain on isolation precautions at SNF stay due to incontinence       Future Appointments   Date Time Provider Department Center   12/16/2020  3:00 PM Claudette Juarez NP [facility] WOUND [facility]   12/28/2020  1:30 PM Ame Vasquez MD [facility] IM [facility] W       Yesi Adler NP  Department of [facility] Medicine   [facility]     DOS: 12/10/2020       Patient note was created using MModal Dictation.  Any errors in syntax or even information may not have been identified and edited on initial review prior to signing this note.

## 2020-12-10 NOTE — PROGRESS NOTES
"[facility] [place] [facility] - Skilled Nursing Care  Adult Nutrition  Progress Note    SUMMARY   Recommendations  Recommendation: Continue diabetic diet, boost glucose BID for protein,RD  following  Goals: 1. Pt's intake meals >75% by RD follow up.  Nutrition Goal Status: goal met  Communication of RD Recs: (POC)    Reason for Assessment  Reason For Assessment: RD follow-up  Relevant Medical History: DM2, HTN, HLD, TIA, CHF  Interdisciplinary Rounds: attended  General Information Comments: % taking boost glucose, did not speak with patient she was sleeping and could not be aroused.  Nutrition Discharge Planning: Discharge on low sodium, carb consistent diet.  Nutrition/Diet History    Patient Reported Diet/Restrictions/Preferences: general  Spiritual, Cultural Beliefs, Jewish Practices, Values that Affect Care: no    Anthropometrics    Temp: 97.4 °F (36.3 °C)  Height Method: Stated  Height: 5' 5" (165.1 cm)  Height (inches): 65 in  Weight Method: Bed Scale  Weight: 125 kg (275 lb 9.2 oz)  Weight (lb): 275.58 lb  Ideal Body Weight (IBW), Female: 125 lb  % Ideal Body Weight, Female (lb): 231.4 %  BMI (Calculated): 45.9  Weight Loss: (could be fluild loss as pt with edema to lower extremities)       Lab/Procedures/Meds    Pertinent Labs Reviewed: reviewed  Pertinent Labs Comments: BUN 60, Cr 1.2, Hg 10.0 Hct 32.5, glucose 207  Pertinent Medications Reviewed: reviewed  Pertinent Medications Comments: Mg , insulin         Estimated/Assessed Needs    Weight Used For Calorie Calculations: 131.2 kg (289 lb 3.9 oz)  Energy Calorie Requirements (kcal): 1798 kcal  Energy Need Method: White-St Jeor(PAL 1.0)  Protein Requirements: 92-118g  Weight Used For Protein Calculations: 131.2 kg (289 lb 3.9 oz)        RDA Method (mL): 1798  CHO Requirement: 224g      Nutrition Prescription Ordered    Current Diet Order: 2000 diabetic diet,   Nutrition Order Comments: Isolation tray  Oral Nutrition Supplement: boost glucose BID " vanilla    Evaluation of Received Nutrient/Fluid Intake    Energy Calories Required: meeting needs  Protein Required: meeting needs  Fluid Required: meeting needs  Comments: LBM 11/23  Tolerance: tolerating  % Intake of Estimated Energy Needs: 75 - 100 %  % Meal Intake: 75 - 100 %    Nutrition Risk    Level of Risk/Frequency of Follow-up: low(one time per week)     Assessment and Plan      Inadequate oral intake     Related to (etiology):   Decreased appetite     Signs and Symptoms (as evidenced by):   Pt reports poor appetite, decreased intake noted in hospital admission.     Interventions(treatment strategy):  Collaboration of care with providers.  Carbohydrate modified diet- 2000 calories  Commercial beverage- calories and protein- boost glucose BID  Resolved    Monitor and Evaluation    Food and Nutrient Intake: energy intake, food and beverage intake  Food and Nutrient Adminstration: diet order  Knowledge/Beliefs/Attitudes: food and nutrition knowledge/skill  Anthropometric Measurements: weight, weight change, body mass index  Biochemical Data, Medical Tests and Procedures: electrolyte and renal panel, gastrointestinal profile, glucose/endocrine profile, inflammatory profile, lipid profile  Nutrition-Focused Physical Findings: overall appearance, extremities, muscles and bones, head and eyes, skin     Malnutrition Assessment  11/24                 Orbital Region (Subcutaneous Fat Loss): well nourished  Upper Arm Region (Subcutaneous Fat Loss): well nourished  Thoracic and Lumbar Region: well nourished   Eagle Region (Muscle Loss): well nourished  Clavicle Bone Region (Muscle Loss): well nourished  Clavicle and Acromion Bone Region (Muscle Loss): well nourished  Scapular Bone Region (Muscle Loss): well nourished  Dorsal Hand (Muscle Loss): moderate depletion  Patellar Region (Muscle Loss): well nourished  Anterior Thigh Region (Muscle Loss): (moderate edema)  Posterior Calf Region (Muscle Loss): (moderate  edema)   Edema (Fluid Accumulation): 3-->moderate(lower extremities)             Nutrition Follow-Up    RD Follow-up?: Yes

## 2020-12-10 NOTE — PLAN OF CARE
Problem: Adult Inpatient Plan of Care  Goal: Optimal Comfort and Wellbeing  Outcome: Ongoing, Not Progressing  Goal: Readiness for Transition of Care  Outcome: Ongoing, Not Progressing

## 2020-12-10 NOTE — PLAN OF CARE
[facility] - Skilled Nursing Care    HOME HEALTH ORDERS  FACE TO FACE ENCOUNTER    Patient Name: Sherin Ortiz  YOB: 1945    PCP: Ame Vasquez MD   PCP Address: [address] / [place] [address]  PCP Phone Number: [phone]  PCP Fax: [phone]    Encounter Date: 12/10/2020    Admit to Home Health    Diagnoses:  Active Hospital Problems    Diagnosis  POA    *Urinary tract infection without hematuria [N39.0]  Yes      Resolved Hospital Problems   No resolved problems to display.       Future Appointments   Date Time Provider Department Center   12/14/2020  8:30 AM Jody Murray NP [facility] NEPHRO [facility]   12/16/2020  3:00 PM Claudette Juarez NP [facility] WOUND [facility]   12/28/2020  1:30 PM Ame Vasquez MD [facility] IM Chris [facility] PCW     Follow-up Information     Schedule an appointment as soon as possible for a visit with Ame Vasquez MD.    Specialty: Internal Medicine  Why: WITHIN 1 WEEK  Contact information:  [address]  [facility] [address]  [phone]                 I have seen and examined this patient face to face today. My clinical findings that support the need for the home health skilled services and home bound status are the following:  Weakness/numbness causing balance and gait disturbance due to Infection making it taxing to leave home.    Allergies:  Review of patient's allergies indicates:   Allergen Reactions    Penicillins Hives     Other reaction(s): Hives  Patient has received cefdinir, ceftriaxone, cefazolin and cefepime in the past with no documented reactions    Sulfa (sulfonamide antibiotics) Other (See Comments)     Eyad, pt states her doctor told her the shakes were possibly caused by an allergy to sulfa       Diet: diabetic diet: 2000 calorie    Activities: activity as tolerated    Nursing:   SN to complete comprehensive assessment including routine vital signs. Instruct on disease process and s/s of complications to report to MD. Review/verify  medication list sent home with the patient at time of discharge  and instruct patient/caregiver as needed. Frequency may be adjusted depending on start of care date.    Notify MD if SBP > 160 or < 90; DBP > 90 or < 50; HR > 120 or < 50; Temp > 101    Weekly BMP and  Fax to PCP    CONSULTS:    Physical Therapy to evaluate and treat. Evaluate for home safety and equipment needs; Establish/upgrade home exercise program. Perform / instruct on therapeutic exercises, gait training, transfer training, and Range of Motion.  Occupational Therapy to evaluate and treat. Evaluate home environment for safety and equipment needs. Perform/Instruct on transfers, ADL training, ROM, and therapeutic exercises.   to evaluate for community resources/long-range planning.  Aide to provide assistance with personal care, ADLs, and vital signs.    MISCELLANEOUS CARE:  Routine Skin for Bedridden Patients: Instruct patient/caregiver to apply moisture barrier cream to all skin folds and wet areas in perineal area daily and after baths and all bowel movements.  Diabetic Care:   SN to perform and educate Diabetic management with blood glucose monitoring:, Fingerstick blood sugar AC and HS and Report CBG < 60 or > 350 to physician.  Heart Failure:      SN to instruct on the following:    Instruct on the definition of CHF.   Instruct on the signs/sympoms of CHF to be reported.   Instruct on and monitor daily weights.   Instruct on factors that cause exacerbation.   Instruct on action, dose, schedule, and side effects of medications.   Instruct on diet as prescribed.   Instruct on activity allowed.   Instruct on life-style modifications for life long management of CHF   SN to assess compliance with daily weights, diet, medications, fluid retention,    safety precautions, activities permitted and life-style modifications.   Additional 1-2 SN visits per week as needed for signs and symptoms     of CHF exacerbation.      For Weight Gain >  "2-3 lbs in 1 day or 4-6 lbs over 1 week notify PCP    WOUND CARE ORDERS    BLE- nursing to cleanse skin with cleansing cloths, apply Sween 24 lotion (pink top) to skin, apply a thin layer of Triad ointment over skin breakdown areas and cover with 4x4 gazue, secure with kerlix then tubigrip G from toes to 2" below knees.   change daily.    Medications: Review discharge medications with patient and family and provide education.      I certify that this patient is confined to her home and needs intermittent skilled nursing care, physical therapy and occupational therapy.      "

## 2020-12-10 NOTE — PT/OT/SLP PROGRESS
"Occupational Therapy   Treatment    Name: Sherin Ortiz  MRN: [id number]  Admit Date: 11/24/2020  Admitting Diagnosis:  Urinary tract infection without hematuria    General Precautions: Standard, fall   Orthopedic Precautions:N/A   Braces:       Recommendations:     Discharge Recommendations: home with home health  Level of Assistance Recommended at Discharge: home with home heaith  Discharge Equipment Recommendations:  none  Barriers to discharge:  Decreased caregiver support    Assessment:     Sherin Ortiz is a 75 y.o. female with a medical diagnosis of Urinary tract infection without hematuria.  She presents with  The following performance deficits affecting function are weakness, impaired endurance, impaired functional mobilty, gait instability, impaired balance, decreased upper extremity function, decreased lower extremity function, decreased safety awareness, decreased ROM. Pt is anxious to go home on Saturday. Pt. participated well with session on this day. Pt. Cooperative with therapy and tolerated treatment well. Pt. Will continue to benefit from continued OT to progress towards goals. Pt seen in room 2* to isolation precautions.    Rehab Potential is good    Activity tolerance:  Good    Plan:     Patient to be seen 5 x/week to address the above listed problems via self-care/home management, therapeutic activities, therapeutic exercises, wheelchair management/training    · Plan of Care Expires: 12/25/20  · Plan of Care Reviewed with: patient    Subjective     Communicated with: Nurse prior to session. " Good morning" .    Pain/Comfort:  · Pain Rating 1: 0/10  · Location - Side 1: Right  · Location - Orientation 1: generalized  · Location 1: shoulder  · Pain Addressed 1: Reposition  · Pain Rating Post-Intervention 1: 0/10    Patient's cultural, spiritual, Episcopalian conflicts given the current situation:  · no    Objective:     Patient found supine with   upon OT entry to room.    Bed Mobility:  "   · Patient completed Rolling/Turning to Left with  modified independence  · Patient completed Rolling/Turning to Right with modified independence  · Patient completed Scooting/Bridging with minimum assistance  · Patient completed Supine to Sit with minimum assistance  · Patient completed Sit to Supine with moderate assistance     Functional Mobility/Transfers:  · Patient completed Sit <> Stand Transfer with contact guard assistance  with  rolling walker   · Patient completed Bed <> Chair Transfer using Stand Pivot technique with contact guard assistance with rolling walker  · Patient completed Toilet Transfer Stand Pivot technique with contact guard assistance with   3 in 1 commode with use of guard rail on EOB and arm support with WC.     · Functional Mobility:   · Pt completed Sit to Stand with contact guard assistance to pull pants over hips in stance.    Activities of Daily Living:  · Grooming: stand by assistance seated at sink level to brush hair and oral care  · Lower Body Dressing: minimum assistance while seated and pull pants over hips in stance.  · Toileting: moderate assistance for hygiene and diaper management while supine    [facility] 6 Click ADL: 19        Treatment & Education:    Pt performed 2x20 sets of towel exercises in circualar/ lateral motions to improve BUE ROM.  Pt educated on [facility] commode transfers and functional mobility.     Patient left up in chair with all lines intact and call button in reachEducation:      GOALS:   Multidisciplinary Problems     Occupational Therapy Goals        Problem: Occupational Therapy Goal    Goal Priority Disciplines Outcome Interventions   Occupational Therapy Goal     OT, PT/OT Ongoing, Progressing    Description: Goals to be met by: 12/15/20     Patient will increase functional independence with ADLs by performing:    Feeding with Modified [place].   --Met  UE Dressing with Set-up Assistance.  ---Met  LE Dressing with Moderate Assistance.  --Met  Grooming  while seated with Set-up Assistance.  Toileting from bedside commode with Moderate Assistance for hygiene and clothing management.  --Met  Bathing from  sitting at sink with Minimal Assistance.  Supine to sit with Modified [place].  Stand pivot transfers with Moderate Assistance. --Met  Upper extremity exercise program x10 minutes, with supervision.  Caregiver will be educated on,level of assist required to safely perform self care tasks and functional transfers.                     Time Tracking:     OT Date of Treatment: OT Date of Treatment: 12/10/20  OT Total Time (min): Total Time (min): 44 min    Billable Minutes:Self Care/Home Management 28  Therapeutic Activity 16    ZELDA Garcia  12/10/2020   OT and SANDOVAL have discussed the above patient goals and status in collaboration with Plan of Care

## 2020-12-11 ENCOUNTER — PATIENT MESSAGE (OUTPATIENT)
Dept: OTHER | Facility: OTHER | Age: 75
End: 2020-12-11

## 2020-12-11 LAB
POCT GLUCOSE: 176 MG/DL (ref 70–110)
POCT GLUCOSE: 257 MG/DL (ref 70–110)
POCT GLUCOSE: 276 MG/DL (ref 70–110)
POCT GLUCOSE: 288 MG/DL (ref 70–110)
SARS-COV-2 RNA RESP QL NAA+PROBE: NOT DETECTED

## 2020-12-11 PROCEDURE — 25000003 PHARM REV CODE 250: Performed by: NURSE PRACTITIONER

## 2020-12-11 PROCEDURE — 97110 THERAPEUTIC EXERCISES: CPT | Mod: CQ

## 2020-12-11 PROCEDURE — 11000004 HC SNF PRIVATE

## 2020-12-11 PROCEDURE — 97535 SELF CARE MNGMENT TRAINING: CPT | Mod: CO

## 2020-12-11 PROCEDURE — 25000003 PHARM REV CODE 250: Performed by: HOSPITALIST

## 2020-12-11 PROCEDURE — 63600175 PHARM REV CODE 636 W HCPCS: Performed by: HOSPITALIST

## 2020-12-11 PROCEDURE — 97530 THERAPEUTIC ACTIVITIES: CPT | Mod: CO

## 2020-12-11 PROCEDURE — 97530 THERAPEUTIC ACTIVITIES: CPT | Mod: CQ

## 2020-12-11 RX ADMIN — ATORVASTATIN CALCIUM 40 MG: 20 TABLET, FILM COATED ORAL at 09:12

## 2020-12-11 RX ADMIN — HEPARIN SODIUM 7500 UNITS: 5000 INJECTION INTRAVENOUS; SUBCUTANEOUS at 02:12

## 2020-12-11 RX ADMIN — BUMETANIDE 2 MG: 1 TABLET ORAL at 10:12

## 2020-12-11 RX ADMIN — INSULIN ASPART 3 UNITS: 100 INJECTION, SOLUTION INTRAVENOUS; SUBCUTANEOUS at 06:12

## 2020-12-11 RX ADMIN — MICONAZOLE NITRATE: 20 OINTMENT TOPICAL at 09:12

## 2020-12-11 RX ADMIN — HEPARIN SODIUM 7500 UNITS: 5000 INJECTION INTRAVENOUS; SUBCUTANEOUS at 09:12

## 2020-12-11 RX ADMIN — CLOPIDOGREL 75 MG: 75 TABLET, FILM COATED ORAL at 10:12

## 2020-12-11 RX ADMIN — MICONAZOLE NITRATE: 20 OINTMENT TOPICAL at 10:12

## 2020-12-11 RX ADMIN — INSULIN ASPART 3 UNITS: 100 INJECTION, SOLUTION INTRAVENOUS; SUBCUTANEOUS at 12:12

## 2020-12-11 RX ADMIN — HEPARIN SODIUM 7500 UNITS: 5000 INJECTION INTRAVENOUS; SUBCUTANEOUS at 06:12

## 2020-12-11 RX ADMIN — INSULIN ASPART 1 UNITS: 100 INJECTION, SOLUTION INTRAVENOUS; SUBCUTANEOUS at 09:12

## 2020-12-11 RX ADMIN — ASPIRIN 81 MG CHEWABLE TABLET 81 MG: 81 TABLET CHEWABLE at 10:12

## 2020-12-11 NOTE — PT/OT/SLP PROGRESS
Physical Therapy Treatment    Patient Name:  Sherin Ortiz   MRN:  [id number]  Admit Date: 11/24/2020  Admitting Diagnosis: Urinary tract infection without hematuria    General Precautions: Standard, fall, contact   Orthopedic Precautions:N/A     Recommendations:     Discharge Recommendations:  home with home health   Level of Assistance Recommended at Discharge: 24 hours light assistance  Discharge Equipment Recommendations: none   Barriers to discharge: Decreased caregiver support    Assessment:     Sherin Ortiz is a 75 y.o. female admitted with a medical diagnosis of Urinary tract infection without hematuria . Pt tolerated treatment well, and will continue to benefit from PT services at this time. Continue with PT POC as indicated.      Performance deficits affecting function:  weakness, impaired endurance, impaired self care skills, impaired functional mobilty, gait instability, impaired balance, decreased upper extremity function, decreased lower extremity function, impaired skin, edema .    Rehab Potential is good    Activity Tolerance: Good    Plan:     Patient to be seen 5 x/week to address the above listed problems via gait training, therapeutic activities, therapeutic exercises    · Plan of Care Expires: 12/25/20  · Plan of Care Reviewed with: patient    Subjective     Pt was willing to participate with therapy.     Pain/Comfort:  · Pain Rating 1: 0/10  · Pain Rating Post-Intervention 1: 0/10    Patient's cultural, spiritual, Restorationist conflicts given the current situation:  · no    Objective:     Communicated with nursing prior to session.  Patient found up in chair with   upon PT entry to room.     Therapeutic Activities and Exercises:   -Mini-Elliptical x15 min   -BLE Therex 3x10 reps: AP,LAQ,Hip flexion,GS,Hip abd/add  -Doffed pants at end of treatment session.    Functional Mobility:  · Bed Mobility:  Scooting: stand by assistance  · Sit to Supine: stand by assistance  · Transfers:  Sit to Stand:   to/from w/c, x2 trials, w/ bed rail and CGA to rise  · Bed to Chair: contact guard assistance and minimum assistance with  rolling walker  using  Stand Pivot    AM-PAC 6 CLICK MOBILITY  14    Patient left supine with call button in reach and nursing notified.    GOALS:   Multidisciplinary Problems     Physical Therapy Goals        Problem: Physical Therapy Goal    Goal Priority Disciplines Outcome Goal Variances Interventions   Physical Therapy Goal     PT, PT/OT Ongoing, Progressing     Description: Goals to be met by: 12/15/20    Patient will increase functional independence with mobility by performing:    . Supine to sit with ModA- Met 12/9/2020  Revised: Supine>sit w/ SPV  . Sit to supine with ModA- Met 12/9/2020  Revised: Sit>supine w/ SPV  . Rolling to Left and Right with Jett- Met 12/9/2020  . Sit to stand transfer with ModA- Met 12/9/2020  Revised sit>stand w/ bedrail/grab bar and SBA  . Bed to chair transfer with ModA using Rolling Walker- met 12/9/2020  Revised: Bed to chair transfer w/ SBA w/ or w/o RW  . Gait  x 8-10 feet with Minimal Assistance using Rolling Walker/ or in // bars.  (discontinued 12/1/2020)  . Wheelchair propulsion x50 feet with Supervision using bilateral uppper extremities (discontinued 12/9/2020- pt reports she does not want to work on w/c propulsion at [facility])  . Lower extremity exercise program x20-30 reps per handout, with assistance as needed  . Stand x1min w/ bed rail/grab bar and SBA                     Time Tracking:     PT Received On: 12/11/20  PT Total Time (min):   38 minutes    Billable Minutes: Therapeutic Activity 23 and Therapeutic Exercise 15    Treatment Type: Treatment  PT/PTA: PTA     PTA Visit Number: 2     Gretta Loera, PTA  12/11/2020

## 2020-12-11 NOTE — PLAN OF CARE
12/11/20 0858   Post-Acute Status   Post-Acute Authorization Home Health   Post-Acute Placement Status Referrals Sent     Home Health Referral sent via . Awaiting approval.

## 2020-12-11 NOTE — PLAN OF CARE
12/11/20 1531   Final Note   Assessment Type Final Discharge Note   Anticipated Discharge Disposition Home-Health     Patient discharging tomorrow. [facility] will follow up. No DME ordered. Jazmyn will arrive at 10:30 am.

## 2020-12-11 NOTE — DISCHARGE SUMMARY
[facility] [place] [facility] - [facility] Medicine  Discharge Summary      Patient Name: Sherin Ortiz  MRN: [id number]  Admission Date: 11/24/2020  Hospital Length of Stay: 17 days  Discharge visit completed on 12/11/2020, patient to discharge on 12/12/2020  Attending Physician: Humza Glover MD   Discharging Provider: Yesi Adler NP  Primary Care Provider: Ame Vasquez MD      HPI:   Mrs. Ortiz is a 75 year old female PMHx of T2DM, polyneuropathy, nephropathy, retinopathy, CKDIII-IV, essential HTN, mixed HLD, history of TIA, and chronic diastolic heart failure who presents to SNF following hospitalization for E coli UTI.  Admission to SNF for secondary weakness and debility.     Patient originally presented to [facility] [place] ED on 11/21 with complaints of weakness, nausea and vomiting.  Also reported associated dry cough along with dyspnea on exertion, which is chronic. Pt found to have E. Coli UTI.      Upon admission to SNF, patient denies dysuria.  Patient does appear fluid overloaded, will resume home Bumex.  Patient endorses chronic cough that is at baseline.  Patient states that she is at baseline bed-bound an utilizes a motorized wheelchair around her home.     Patient will be treated at [facility] with PT and OT to improve functional status and ability to perform ADLs.     * No surgery found *      Hospital Course:   Patient progressed with PT and OT- she is likely back to her functional baseline which is nonambulatory the use of a motorized wheelchair. Patient had no significant events during their stay at SNF.  Patient completed IV antibiotics for UTI at SNF, patient denies any urinary symptoms at this time.  Home health was set up. DME was ordered if needed. Follow up appointment to be made by patient within one week. All prescriptions and discharge instructions were ordered to be given to the patient prior to discharge.     PEx  Constitutional: Patient appears debilitated.  No distress  noted  HENT:   Head: Normocephalic and atraumatic.   Eyes: Pupils are equal, round  Neck: Normal range of motion. Neck supple.   Cardiovascular: Normal rate, regular rhythm and normal heart sounds.    Pulmonary/Chest: Effort normal and breath sounds are clear  Abdominal: Soft. Bowel sounds are normal.   Musculoskeletal: Normal range of motion.   Neurological: Alert and oriented to person, place, and time.   Psychiatric: Normal mood and affect. Behavior is normal.   Skin: Skin is warm and dry.  +3 pitting edema to bilateral lower extremities     Consults:     No new Assessment & Plan notes have been filed under this hospital service since the last note was generated.  Service: Hospital Medicine    Final Active Diagnoses:    Diagnosis Date Noted POA    PRINCIPAL PROBLEM:  Urinary tract infection without hematuria [N39.0] 11/21/2020 Yes      Problems Resolved During this Admission:       Discharged Condition: good    Disposition: Home-Health Care [facility]    Follow Up:  Follow-up Information     Schedule an appointment as soon as possible for a visit with Ame Vasquze MD.    Specialty: Internal Medicine  Why: WITHIN 1 WEEK  Contact information:  [address]  [place] [id number] [phone]                 Patient Instructions:      Ambulatory referral/consult to Nephrology   Standing Status: Future   Referral Priority: Routine Referral Type: Consultation   Referral Reason: Specialty Services Required   Requested Specialty: Nephrology   Number of Visits Requested: 1     No driving until:   Order Comments: Cleared by PCP     Notify your health care provider if you experience any of the following:  temperature >100.4     Notify your health care provider if you experience any of the following:  persistent nausea and vomiting or diarrhea     Notify your health care provider if you experience any of the following:  severe uncontrolled pain     Notify your health care provider if you experience any of the following:   "redness, tenderness, or signs of infection (pain, swelling, redness, odor or green/yellow discharge around incision site)     Notify your health care provider if you experience any of the following:  difficulty breathing or increased cough     Notify your health care provider if you experience any of the following:  severe persistent headache     Notify your health care provider if you experience any of the following:  worsening rash     Notify your health care provider if you experience any of the following:  persistent dizziness, light-headedness, or visual disturbances     Notify your health care provider if you experience any of the following:  increased confusion or weakness     Activity as tolerated       Significant Diagnostic Studies: Labs:   BMP:   Recent Labs   Lab 12/10/20  0501   *      K 4.5   CL 99   CO2 29   BUN 60*   CREATININE 2.1*   CALCIUM 9.2   MG 2.1    and CBC   Recent Labs   Lab 12/10/20  0501   WBC 3.83*   HGB 10.0*   HCT 32.5*          Pending Diagnostic Studies:     None         Medications:  Reconciled Home Medications:      Medication List      START taking these medications    acetaminophen 325 MG tablet  Commonly known as: TYLENOL  Take 2 tablets (650 mg total) by mouth every 6 (six) hours as needed for Pain.        CONTINUE taking these medications    ACCU-CHEK RAMIRO PLUS TEST STRP Strp  Generic drug: blood sugar diagnostic  TEST TWICE DAILY     ACCU-CHEK SOFTCLIX LANCETS Misc  Generic drug: lancets  Test twice daily     aspirin 81 MG Chew  Take 81 mg by mouth once daily.     atorvastatin 40 MG tablet  Commonly known as: LIPITOR  TAKE 1 TABLET EVERY EVENING     BD INSULIN SYRINGE ULTRA-FINE 0.5 mL 30 gauge x 1/2" Syrg  Generic drug: insulin syringe-needle U-100  USE TO INJECT EVERY NIGHT     blood-glucose meter Misc  Dispense one meter: Insurance Brand Preference Accu-Chek     bumetanide 1 MG tablet  Commonly known as: BUMEX  Take 2 tablets (2 mg total) by mouth " "once daily.     clopidogreL 75 mg tablet  Commonly known as: PLAVIX  TAKE 1 TABLET EVERY DAY     insulin glargine 100 unit/mL injection  Commonly known as: LANTUS U-100 INSULIN  INJECT 30-35 UNITS SUBCUTANEOUSLY IN THE EVENING DEPENDING ON SCALE (DISCARD EACH VIAL AFTER 28 DAYS)     insulin syr/ndl U100 half yesenia 0.5 mL 30 gauge x 1/2" Syrg  Commonly known as: DROPLET INSULIN SYR HALF UNIT  USE TO INJECT EVERY NIGHT     miconazole nitrate 2% 2 % Oint  Commonly known as: MICOTIN  Apply topically 2 (two) times daily.        STOP taking these medications    glimepiride 1 MG tablet  Commonly known as: AMARYL     ketoconazole 2 % cream  Commonly known as: NIZORAL     nitrofurantoin 100 MG capsule  Commonly known as: MACRODANTIN     silver sulfADIAZINE 1% 1 % cream  Commonly known as: SILVADENE     triamcinolone acetonide 0.1% 0.1 % cream  Commonly known as: KENALOG            Indwelling Lines/Drains at time of discharge:   Lines/Drains/Airways     None                 Time spent on the discharge of patient: 36 minutes  Patient was seen and examined on the date of discharge and determined to be suitable for discharge.         Yesi Adler NP  Department of Hospital Medicine  [facility]  "

## 2020-12-11 NOTE — PT/OT/SLP PROGRESS
"Occupational Therapy   Treatment    Name: Sherin Ortiz  MRN: [id number]  Admit Date: 11/24/2020  Admitting Diagnosis:  Urinary tract infection without hematuria    General Precautions: Standard, fall   Orthopedic Precautions:N/A   Braces:       Recommendations:     Discharge Recommendations: home with home health  Level of Assistance Recommended at Discharge: Home with home health  Discharge Equipment Recommendations:  none  Barriers to discharge:  Decreased caregiver support    Assessment:     Sherin Ortiz is a 75 y.o. female with a medical diagnosis of Urinary tract infection without hematuria.  She presents with the following performance deficits affecting function are weakness, impaired endurance, impaired self care skills, impaired functional mobilty, impaired balance, decreased upper extremity function, decreased lower extremity function, decreased safety awareness, decreased ROM. Noted pt with a skin tear on her R hip notified nurse  (Mindi) she addressed the tear. Pt is progressing with her goals and plans to return to her own resident alone with son near by.Pt. participated well with session on this day. Pt. Cooperative with therapy and tolerated treatment well. Pt. Will continue to benefit from continued OT to progress towards goals. Pt seen in room 2* to contact isolation.    Rehab Potential is good    Activity tolerance:  Good    Plan:     Patient to be seen 5 x/week to address the above listed problems via self-care/home management, therapeutic activities, therapeutic exercises, wheelchair management/training    · Plan of Care Expires: 12/25/20  · Plan of Care Reviewed with: patient    Subjective     Communicated with: Nurse prior to session. "Good morning" .    Pain/Comfort:  · Pain Rating 1: 0/10  · Location - Side 1: Right  · Location - Orientation 1: generalized  · Location 1: shoulder  · Pain Addressed 1: Reposition  · Pain Rating Post-Intervention 1: 0/10    Patient's cultural, spiritual, " Mandaen conflicts given the current situation:  · no    Objective:     Patient found supine with   upon OT entry to room.    Bed Mobility:    · Patient completed Rolling/Turning to Left with  modified independence  · Patient completed Rolling/Turning to Right with modified independence  · Patient completed Scooting/Bridging with minimum assistance  · Patient completed Supine to Sit with minimum assistance     Functional Mobility/Transfers:  · Patient completed Sit <> Stand Transfer with contact guard assistance  with  rolling walker   · Patient completed Bed <> Chair Transfer using Stand Pivot technique with contact guard assistance with rolling walker  · Functional Mobility: Pt performed sit to stand EOB utlizing guard rail and was able to tolerate static standing for ~2 mins.    Activities of Daily Living:  · Grooming: supervision to wash face and oral care  · Upper Body Dressing: stand by assistance to doff/shana gown while seated  · Lower Body Dressing: contact guard assistance to shana pants and pull over hips in stance .  · Toileting: maximal assistance with hygiene and diaper management    [facility] 6 Click ADL: 19    Treatment & Education:  Pt performed 2x20 sets of towel exercises in circular/lateral motions to improve BUE ROM.  Pt educated on safety awareness and transfers with ADL task.      Patient left up in chair with all lines intact and call button in reachEducation:      GOALS:   Multidisciplinary Problems     Occupational Therapy Goals        Problem: Occupational Therapy Goal    Goal Priority Disciplines Outcome Interventions   Occupational Therapy Goal     OT, PT/OT Ongoing, Progressing    Description: Goals to be met by: 12/15/20     Patient will increase functional independence with ADLs by performing:    Feeding with Modified [place].   --Met  UE Dressing with Set-up Assistance.  ---Met  LE Dressing with Moderate Assistance.  --Met  Grooming while seated with Set-up Assistance.  Toileting  from bedside commode with Moderate Assistance for hygiene and clothing management.  --Met  Bathing from  sitting at sink with Minimal Assistance.  Supine to sit with Modified [place].  Stand pivot transfers with Moderate Assistance. --Met  Upper extremity exercise program x10 minutes, with supervision.  Caregiver will be educated on,level of assist required to safely perform self care tasks and functional transfers.                     Time Tracking:     OT Date of Treatment: OT Date of Treatment: 12/10/20  OT Total Time (min): Total Time (min): 44 min    Billable Minutes:Self Care/Home Management 30  Therapeutic Activity 15    ZELDA Garcia  12/11/2020   OT and SANDOVAL have discussed the above patient goals and status in collaboration with Plan of Care

## 2020-12-11 NOTE — HOSPITAL COURSE
Patient progressed with PT and OT- she is likely back to her functional baseline which is nonambulatory the use of a motorized wheelchair. Patient had no significant events during their stay at SNF.  Patient completed IV antibiotics for UTI at SNF, patient denies any urinary symptoms at this time.  Home health was set up. DME was ordered if needed. Follow up appointment to be made by patient within one week. All prescriptions and discharge instructions were ordered to be given to the patient prior to discharge.     PEx  Constitutional: Patient appears debilitated.  No distress noted  HENT:   Head: Normocephalic and atraumatic.   Eyes: Pupils are equal, round  Neck: Normal range of motion. Neck supple.   Cardiovascular: Normal rate, regular rhythm and normal heart sounds.    Pulmonary/Chest: Effort normal and breath sounds are clear  Abdominal: Soft. Bowel sounds are normal.   Musculoskeletal: Normal range of motion.   Neurological: Alert and oriented to person, place, and time.   Psychiatric: Normal mood and affect. Behavior is normal.   Skin: Skin is warm and dry.  +3 pitting edema to bilateral lower extremities

## 2020-12-11 NOTE — HPI
Mrs. Ortiz is a 75 year old female PMHx of T2DM, polyneuropathy, nephropathy, retinopathy, CKDIII-IV, essential HTN, mixed HLD, history of TIA, and chronic diastolic heart failure who presents to SNF following hospitalization for E coli UTI.  Admission to SNF for secondary weakness and debility.     Patient originally presented to [facility] ED on 11/21 with complaints of weakness, nausea and vomiting.  Also reported associated dry cough along with dyspnea on exertion, which is chronic. Pt found to have E. Coli UTI.      Upon admission to SNF, patient denies dysuria.  Patient does appear fluid overloaded, will resume home Bumex.  Patient endorses chronic cough that is at baseline.  Patient states that she is at baseline bed-bound an utilizes a motorized wheelchair around her home.     Patient will be treated at [facility] with PT and OT to improve functional status and ability to perform ADLs.

## 2020-12-12 VITALS
HEIGHT: 65 IN | HEART RATE: 73 BPM | DIASTOLIC BLOOD PRESSURE: 60 MMHG | TEMPERATURE: 98 F | SYSTOLIC BLOOD PRESSURE: 135 MMHG | WEIGHT: 275.56 LBS | BODY MASS INDEX: 45.91 KG/M2 | RESPIRATION RATE: 18 BRPM | OXYGEN SATURATION: 97 %

## 2020-12-12 LAB — POCT GLUCOSE: 203 MG/DL (ref 70–110)

## 2020-12-12 PROCEDURE — 25000003 PHARM REV CODE 250: Performed by: NURSE PRACTITIONER

## 2020-12-12 PROCEDURE — 63600175 PHARM REV CODE 636 W HCPCS: Performed by: HOSPITALIST

## 2020-12-12 PROCEDURE — 25000003 PHARM REV CODE 250: Performed by: HOSPITALIST

## 2020-12-12 RX ADMIN — BUMETANIDE 2 MG: 1 TABLET ORAL at 09:12

## 2020-12-12 RX ADMIN — MICONAZOLE NITRATE: 20 OINTMENT TOPICAL at 09:12

## 2020-12-12 RX ADMIN — ASPIRIN 81 MG CHEWABLE TABLET 81 MG: 81 TABLET CHEWABLE at 09:12

## 2020-12-12 RX ADMIN — CLOPIDOGREL 75 MG: 75 TABLET, FILM COATED ORAL at 09:12

## 2020-12-12 RX ADMIN — HEPARIN SODIUM 7500 UNITS: 5000 INJECTION INTRAVENOUS; SUBCUTANEOUS at 05:12

## 2020-12-12 RX ADMIN — INSULIN ASPART 2 UNITS: 100 INJECTION, SOLUTION INTRAVENOUS; SUBCUTANEOUS at 09:12

## 2020-12-12 NOTE — NURSING
Discharged from floor via w/c to car to home  With family member. Gave copy of after visit summary. Denied questions or concerns.

## 2020-12-15 ENCOUNTER — PATIENT OUTREACH (OUTPATIENT)
Dept: ADMINISTRATIVE | Facility: OTHER | Age: 75
End: 2020-12-15

## 2020-12-16 ENCOUNTER — OFFICE VISIT (OUTPATIENT)
Dept: WOUND CARE | Facility: CLINIC | Age: 75
End: 2020-12-16
Payer: MEDICARE

## 2020-12-16 VITALS — SYSTOLIC BLOOD PRESSURE: 134 MMHG | DIASTOLIC BLOOD PRESSURE: 82 MMHG | TEMPERATURE: 97 F | HEART RATE: 93 BPM

## 2020-12-16 DIAGNOSIS — I89.0 LYMPHEDEMA: ICD-10-CM

## 2020-12-16 DIAGNOSIS — L97.821 VARICOSE VEINS OF LEFT LOWER EXTREMITY WITH ULCER OF OTHER PART OF LOWER LEG LIMITED TO BREAKDOWN OF SKIN: ICD-10-CM

## 2020-12-16 DIAGNOSIS — I83.018 VARICOSE VEINS OF RIGHT LOWER EXTREMITY WITH ULCER OTHER PART OF LOWER LEG: ICD-10-CM

## 2020-12-16 DIAGNOSIS — L97.819 VARICOSE VEINS OF RIGHT LOWER EXTREMITY WITH ULCER OTHER PART OF LOWER LEG: ICD-10-CM

## 2020-12-16 DIAGNOSIS — I83.028 VARICOSE VEINS OF LEFT LOWER EXTREMITY WITH ULCER OF OTHER PART OF LOWER LEG LIMITED TO BREAKDOWN OF SKIN: ICD-10-CM

## 2020-12-16 DIAGNOSIS — I87.2 VENOUS INSUFFICIENCY OF BOTH LOWER EXTREMITIES: Primary | ICD-10-CM

## 2020-12-16 PROCEDURE — 1101F PT FALLS ASSESS-DOCD LE1/YR: CPT | Mod: CPTII,S$GLB,, | Performed by: NURSE PRACTITIONER

## 2020-12-16 PROCEDURE — 99999 PR PBB SHADOW E&M-EST. PATIENT-LVL III: ICD-10-PCS | Mod: PBBFAC,,, | Performed by: NURSE PRACTITIONER

## 2020-12-16 PROCEDURE — 3288F PR FALLS RISK ASSESSMENT DOCUMENTED: ICD-10-PCS | Mod: CPTII,S$GLB,, | Performed by: NURSE PRACTITIONER

## 2020-12-16 PROCEDURE — 3079F PR MOST RECENT DIASTOLIC BLOOD PRESSURE 80-89 MM HG: ICD-10-PCS | Mod: CPTII,S$GLB,, | Performed by: NURSE PRACTITIONER

## 2020-12-16 PROCEDURE — 99999 PR PBB SHADOW E&M-EST. PATIENT-LVL III: CPT | Mod: PBBFAC,,, | Performed by: NURSE PRACTITIONER

## 2020-12-16 PROCEDURE — 1159F PR MEDICATION LIST DOCUMENTED IN MEDICAL RECORD: ICD-10-PCS | Mod: S$GLB,,, | Performed by: NURSE PRACTITIONER

## 2020-12-16 PROCEDURE — 1159F MED LIST DOCD IN RCRD: CPT | Mod: S$GLB,,, | Performed by: NURSE PRACTITIONER

## 2020-12-16 PROCEDURE — 3079F DIAST BP 80-89 MM HG: CPT | Mod: CPTII,S$GLB,, | Performed by: NURSE PRACTITIONER

## 2020-12-16 PROCEDURE — 99499 RISK ADDL DX/OHS AUDIT: ICD-10-PCS | Mod: S$GLB,,, | Performed by: NURSE PRACTITIONER

## 2020-12-16 PROCEDURE — 99213 PR OFFICE/OUTPT VISIT, EST, LEVL III, 20-29 MIN: ICD-10-PCS | Mod: S$GLB,,, | Performed by: NURSE PRACTITIONER

## 2020-12-16 PROCEDURE — 99499 UNLISTED E&M SERVICE: CPT | Mod: S$GLB,,, | Performed by: NURSE PRACTITIONER

## 2020-12-16 PROCEDURE — 3075F SYST BP GE 130 - 139MM HG: CPT | Mod: CPTII,S$GLB,, | Performed by: NURSE PRACTITIONER

## 2020-12-16 PROCEDURE — 3075F PR MOST RECENT SYSTOLIC BLOOD PRESS GE 130-139MM HG: ICD-10-PCS | Mod: CPTII,S$GLB,, | Performed by: NURSE PRACTITIONER

## 2020-12-16 PROCEDURE — 3288F FALL RISK ASSESSMENT DOCD: CPT | Mod: CPTII,S$GLB,, | Performed by: NURSE PRACTITIONER

## 2020-12-16 PROCEDURE — 99213 OFFICE O/P EST LOW 20 MIN: CPT | Mod: S$GLB,,, | Performed by: NURSE PRACTITIONER

## 2020-12-16 PROCEDURE — 1126F AMNT PAIN NOTED NONE PRSNT: CPT | Mod: S$GLB,,, | Performed by: NURSE PRACTITIONER

## 2020-12-16 PROCEDURE — 1126F PR PAIN SEVERITY QUANTIFIED, NO PAIN PRESENT: ICD-10-PCS | Mod: S$GLB,,, | Performed by: NURSE PRACTITIONER

## 2020-12-16 PROCEDURE — 1101F PR PT FALLS ASSESS DOC 0-1 FALLS W/OUT INJ PAST YR: ICD-10-PCS | Mod: CPTII,S$GLB,, | Performed by: NURSE PRACTITIONER

## 2020-12-16 NOTE — PROGRESS NOTES
Subjective:       Patient ID: Sherin Ortiz is a 75 y.o. female.    Chief Complaint: Wound Check    Wound Check    Wound Check:   This patient is seen today for reevaluation of recurrent ulcers to both lower legs.  Unna boots are on both legs.  The right leg wounds are healed.  The left leg wound is healing as evidenced by wound contracture and epithelialization. She has home health services through [facility]. Problems that interfere with wound healing include multiple co-morbidities, diabetes, edema, diabetic neuropathy and  morbid obesity. Because of safety issues we are unable to weigh the patient as she is unstable on her feet.  The patient ambulates with great difficulty secondary to her body habitus and uses a motorized wheelchair which we cannot get on the scale to weigh her. Denies fever, chills, pain, erythema, warmth, drainage.       Review of Systems  Unchanged from previous visit.  Objective:      Physical Exam  Vitals signs reviewed.   Constitutional:       General: She is not in acute distress.     Appearance: She is not diaphoretic.      Comments: Morbidly obese   HENT:      Head: Normocephalic and atraumatic.   Neck:      Musculoskeletal: Normal range of motion and neck supple.   Pulmonary:      Effort: Pulmonary effort is normal. No respiratory distress.   Musculoskeletal: Normal range of motion.         General: No tenderness.        Legs:    Skin:     General: Skin is warm and dry.      Coloration: Skin is not pale.      Findings: Rash (dermatitis and tinea bilateral lower extremities) present. No erythema.      Nails: There is no clubbing.     Neurological:      Mental Status: She is alert and oriented to person, place, and time.      Coordination: Abnormal coordination: .   Psychiatric:         Behavior: Behavior normal.         Thought Content: Thought content normal.         Judgment: Judgment normal.         Left shin            Right  Leg              Hemoglobin A1C   Date Value Ref  Range Status   11/22/2020 9.1 (H) 4.0 - 5.6 % Final     Comment:     ADA Screening Guidelines:  5.7-6.4%  Consistent with prediabetes  >or=6.5%  Consistent with diabetes  High levels of fetal hemoglobin interfere with the HbA1C  assay. Heterozygous hemoglobin variants (HbS, HgC, etc)do  not significantly interfere with this assay.   However, presence of multiple variants may affect accuracy.     09/24/2020 9.4 (H) 4.0 - 5.6 % Final     Comment:     ADA Screening Guidelines:  5.7-6.4%  Consistent with prediabetes  >or=6.5%  Consistent with diabetes  High levels of fetal hemoglobin interfere with the HbA1C  assay. Heterozygous hemoglobin variants (HbS, HgC, etc)do  not significantly interfere with this assay.   However, presence of multiple variants may affect accuracy.     03/09/2020 9.1 (H) 4.0 - 5.6 % Final     Comment:     ADA Screening Guidelines:  5.7-6.4%  Consistent with prediabetes  >or=6.5%  Consistent with diabetes  High levels of fetal hemoglobin interfere with the HbA1C  assay. Heterozygous hemoglobin variants (HbS, HgC, etc)do  not significantly interfere with this assay.   However, presence of multiple variants may affect accuracy.       ..  Lab Results   Component Value Date    ALBUMIN 2.0 (L) 11/24/2020         Assessment:       1. Venous insufficiency of both lower extremities    2. Lymphedema    3. Varicose veins of left lower extremity with ulcer of other part of lower leg limited to breakdown of skin    4. Varicose veins of right lower extremity with ulcer other part of lower leg        Plan:           Wounds dressed, verbal instructions given  Return to clinic in one month.      [facility] Orders:    Triamcinolone cream to bilateral lower legs.  Ketoconazole cream to both feet.  Cleanse wounds with wound cleanser.  Triad to wounds BLE  Unna boot (zinc oxide, kerlix and coban) bilateral lower legs.  Place cotton in between the toes, cover with cotton gauze and secure with roll gauze.  Change  dressings twice weekly.  Skilled nurse visit-2 x weekly

## 2020-12-26 PROCEDURE — G0179 PR HOME HEALTH MD RECERTIFICATION: ICD-10-PCS | Mod: ,,, | Performed by: INTERNAL MEDICINE

## 2020-12-26 PROCEDURE — G0179 MD RECERTIFICATION HHA PT: HCPCS | Mod: ,,, | Performed by: INTERNAL MEDICINE

## 2020-12-28 ENCOUNTER — OFFICE VISIT (OUTPATIENT)
Dept: INTERNAL MEDICINE | Facility: CLINIC | Age: 75
End: 2020-12-28
Payer: MEDICARE

## 2020-12-28 VITALS — BODY MASS INDEX: 45.86 KG/M2 | DIASTOLIC BLOOD PRESSURE: 60 MMHG | HEIGHT: 65 IN | SYSTOLIC BLOOD PRESSURE: 142 MMHG

## 2020-12-28 DIAGNOSIS — Z09 HOSPITAL DISCHARGE FOLLOW-UP: Primary | ICD-10-CM

## 2020-12-28 PROCEDURE — 3078F PR MOST RECENT DIASTOLIC BLOOD PRESSURE < 80 MM HG: ICD-10-PCS | Mod: HCNC,CPTII,S$GLB, | Performed by: INTERNAL MEDICINE

## 2020-12-28 PROCEDURE — 1126F AMNT PAIN NOTED NONE PRSNT: CPT | Mod: HCNC,S$GLB,, | Performed by: INTERNAL MEDICINE

## 2020-12-28 PROCEDURE — 3077F SYST BP >= 140 MM HG: CPT | Mod: HCNC,CPTII,S$GLB, | Performed by: INTERNAL MEDICINE

## 2020-12-28 PROCEDURE — 99214 OFFICE O/P EST MOD 30 MIN: CPT | Mod: HCNC,S$GLB,, | Performed by: INTERNAL MEDICINE

## 2020-12-28 PROCEDURE — 1101F PT FALLS ASSESS-DOCD LE1/YR: CPT | Mod: HCNC,CPTII,S$GLB, | Performed by: INTERNAL MEDICINE

## 2020-12-28 PROCEDURE — 3288F PR FALLS RISK ASSESSMENT DOCUMENTED: ICD-10-PCS | Mod: HCNC,CPTII,S$GLB, | Performed by: INTERNAL MEDICINE

## 2020-12-28 PROCEDURE — 3288F FALL RISK ASSESSMENT DOCD: CPT | Mod: HCNC,CPTII,S$GLB, | Performed by: INTERNAL MEDICINE

## 2020-12-28 PROCEDURE — 1159F MED LIST DOCD IN RCRD: CPT | Mod: HCNC,S$GLB,, | Performed by: INTERNAL MEDICINE

## 2020-12-28 PROCEDURE — 3078F DIAST BP <80 MM HG: CPT | Mod: HCNC,CPTII,S$GLB, | Performed by: INTERNAL MEDICINE

## 2020-12-28 PROCEDURE — 1126F PR PAIN SEVERITY QUANTIFIED, NO PAIN PRESENT: ICD-10-PCS | Mod: HCNC,S$GLB,, | Performed by: INTERNAL MEDICINE

## 2020-12-28 PROCEDURE — 99999 PR PBB SHADOW E&M-EST. PATIENT-LVL III: CPT | Mod: PBBFAC,HCNC,, | Performed by: INTERNAL MEDICINE

## 2020-12-28 PROCEDURE — 99214 PR OFFICE/OUTPT VISIT, EST, LEVL IV, 30-39 MIN: ICD-10-PCS | Mod: HCNC,S$GLB,, | Performed by: INTERNAL MEDICINE

## 2020-12-28 PROCEDURE — 1101F PR PT FALLS ASSESS DOC 0-1 FALLS W/OUT INJ PAST YR: ICD-10-PCS | Mod: HCNC,CPTII,S$GLB, | Performed by: INTERNAL MEDICINE

## 2020-12-28 PROCEDURE — 1159F PR MEDICATION LIST DOCUMENTED IN MEDICAL RECORD: ICD-10-PCS | Mod: HCNC,S$GLB,, | Performed by: INTERNAL MEDICINE

## 2020-12-28 PROCEDURE — 99999 PR PBB SHADOW E&M-EST. PATIENT-LVL III: ICD-10-PCS | Mod: PBBFAC,HCNC,, | Performed by: INTERNAL MEDICINE

## 2020-12-28 PROCEDURE — 3077F PR MOST RECENT SYSTOLIC BLOOD PRESSURE >= 140 MM HG: ICD-10-PCS | Mod: HCNC,CPTII,S$GLB, | Performed by: INTERNAL MEDICINE

## 2020-12-28 NOTE — PROGRESS NOTES
Subjective:      Patient ID: Sherin Ortiz is a 75 y.o. female.    Chief Complaint: Follow-up    HPI:  HPI   Transitional Care Note    Family and/or Caretaker present at visit?  no  Diagnostic tests reviewed/disposition: No diagnosic tests pending after this hospitalization.  Disease/illness education: Aware of recurrent UTI  Home health/community services discussion/referrals: Patient does not have home health established from hospital visit.  They do not need home health.  If needed, we will set up home health for the patient.   Patient does have home health to wrap her legs long term  Establishment or re-establishment of referral orders for community resources: No other necessary community resources.   Discussion with other health care providers: No discussion with other health care providers necessary.     No antibiotics post hospitalization  3 UTI in 2020 documented by culture but there have been at least one other.    Patient was sent to rehab for 2 weeks after the hospital    Diabetes Management Status    Statin: Taking  ACE/ARB: Not taking    Screening or Prevention Patient's value Goal Complete/Controlled?   HgA1C Testing and Control   Lab Results   Component Value Date    HGBA1C 9.1 (H) 11/22/2020      Annually/Less than 8% No   Lipid profile : 09/24/2020 Annually Yes   LDL control Lab Results   Component Value Date    LDLCALC 59.8 (L) 09/24/2020    Annually/Less than 100 mg/dl  Yes   Nephropathy screening Lab Results   Component Value Date    LABMICR 29.1 09/26/2011     Lab Results   Component Value Date    PROTEINUA 2+ (A) 11/21/2020    Annually Yes   Blood pressure BP Readings from Last 1 Encounters:   12/28/20 (!) 142/60    Less than 140/90 No   Dilated retinal exam : 05/29/2019 Annually Yes   Foot exam   : 05/30/2019 Annually No         Patient Active Problem List   Diagnosis    Bilateral leg edema    Tinea pedis    Hyperlipidemia LDL goal <100    Essential hypertension    PSC (posterior  subcapsular cataract) - Both Eyes    Nuclear sclerosis - Both Eyes    Asteroid hyalosis - Left Eye    Stage 3 chronic kidney disease    Dystrophic nail    Intractable vomiting with nausea    Weakness    Inability to walk    Morbid obesity with BMI of 40.0-44.9, adult    Diabetic peripheral neuropathy associated with type 2 diabetes mellitus    Anemia of chronic disease    Hypoalbuminemia    Wheelchair dependent    Venous insufficiency of leg    Dermatitis, stasis    Uncontrolled type 2 diabetes mellitus with stage 3 chronic kidney disease, with long-term current use of insulin    Hyperparathyroidism    Aortic atherosclerosis    PAOD (peripheral arterial occlusive disease)    Right shoulder strain, initial encounter    Hx of transient ischemic attack (TIA)    (HFpEF) heart failure with preserved ejection fraction    Leukocytosis    Hyperglycemia    Prolonged Q-T interval on ECG    Goals of care, counseling/discussion    Acute gout of left hand    Left wrist pain    Dermatitis associated with moisture    Lymphedema    Chronic kidney disease, stage III (moderate)    Varicose veins of left lower extremity with ulcer limited to breakdown of skin    Varicose veins of right lower extremity with ulcer other part of lower leg    Urinary tract infection without hematuria     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    Benign essential hypertension 11/14/2012    Cataract     s/p phacoemulsification    Chronic kidney disease (CKD), stage III (moderate) 9/12/2013    Diabetic peripheral neuropathy associated with type 2 diabetes mellitus 11/14/2014    causing right hemiparesis    Gait disorder     Hyperlipidemia     Iritis - Both Eyes 6/10/2013    Kidney stone     Lymphedema     Morbid obesity with BMI of 40.0-44.9, adult 2/18/2015    Nephrolithiasis 4/20/2016    NS (nuclear sclerosis) 4/1/2013    Nuclear sclerosis - Both Eyes  4/29/2013    Preseptal cellulitis - Right Eye 4/29/2013    Proliferative diabetic retinopathy - Both Eyes 4/29/2013    Proliferative diabetic retinopathy, both eyes 4/1/2013    PSC (posterior subcapsular cataract) - Both Eyes 4/29/2013    S/P hernia repair 12/19/2012    TIA (transient ischemic attack) 11/18/2014    Tinea pedis 7/24/2012    Tinea pedis is present on both feet.    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 Kat Bower     Family History   Problem Relation Age of Onset    Diabetes Sister     Cataracts Sister     Heart disease Brother     Cataracts Brother     Leukemia Mother     Cancer Neg Hx     Amblyopia Neg Hx     Blindness Neg Hx     Glaucoma Neg Hx     Hypertension Neg Hx     Macular degeneration Neg Hx     Retinal detachment Neg Hx     Strabismus Neg Hx     Stroke Neg Hx     Thyroid disease Neg Hx     Kidney disease Neg Hx      Review of Systems  "  Constitutional: Negative for activity change, chills, fever and unexpected weight change.   Respiratory: Negative for cough, chest tightness, shortness of breath and wheezing.    Cardiovascular: Negative for chest pain, palpitations and leg swelling.     Objective:     Vitals:    12/28/20 1325   BP: (!) 142/60   Height: 5' 5" (1.651 m)   PainSc: 0-No pain     Body mass index is 45.86 kg/m².  Physical Exam     Patient is not able to walk although she still transfers and is currently in a motorized wheelchair today.  Her lungs are clear to auscultation and percussion her heart is regular rate and rhythm her abdomen is benign    Her legs are wrapped and she is followed in wound care  Assessment:     1. Hospital discharge follow-up      Plan:   Sherin was seen today for follow-up.    Diagnoses and all orders for this visit:    Hospital discharge follow-up     Patient was admitted for urinary tract infection.  She has had for documented urinary tract infections this year.  Two of which with the same organism in the last to were different organisms.  She does have a urologist and I have recommended she see her urologist regarding the sudden onset of these.  I have told her in all likelihood I also believe that her uncontrolled diabetes is likely related to her recurrent urinary tract infections.  I have offered her an endocrine consult which she refuses she states that after the 1st of the year she will pay attention to her diet and change her A1c numbers.    Problem List Items Addressed This Visit     None      Visit Diagnoses     Hospital discharge follow-up    -  Primary        No orders of the defined types were placed in this encounter.    Follow up in about 3 months (around 3/28/2021) for Follow up with lab before.     Medication List          Accurate as of December 28, 2020  7:06 PM. If you have any questions, ask your nurse or doctor.            CONTINUE taking these medications    ACCU-CHEK RAMIRO PLUS TEST STRP " "Strp  Generic drug: blood sugar diagnostic  TEST TWICE DAILY     ACCU-CHEK SOFTCLIX LANCETS Misc  Generic drug: lancets     acetaminophen 325 MG tablet  Commonly known as: TYLENOL  Take 2 tablets (650 mg total) by mouth every 6 (six) hours as needed for Pain.     aspirin 81 MG Chew     atorvastatin 40 MG tablet  Commonly known as: LIPITOR  TAKE 1 TABLET EVERY EVENING     BD INSULIN SYRINGE ULTRA-FINE 0.5 mL 30 gauge x 1/2" Syrg  Generic drug: insulin syringe-needle U-100  USE TO INJECT EVERY NIGHT     blood-glucose meter Misc  Dispense one meter: Insurance Brand Preference Accu-Chek     bumetanide 1 MG tablet  Commonly known as: BUMEX  Take 2 tablets (2 mg total) by mouth once daily.     clopidogreL 75 mg tablet  Commonly known as: PLAVIX  TAKE 1 TABLET EVERY DAY     insulin glargine 100 unit/mL injection  Commonly known as: LANTUS U-100 INSULIN  INJECT 30-35 UNITS SUBCUTANEOUSLY IN THE EVENING DEPENDING ON SCALE (DISCARD EACH VIAL AFTER 28 DAYS)     insulin syr/ndl U100 half yesenia 0.5 mL 30 gauge x 1/2" Syrg  Commonly known as: DROPLET INSULIN SYR HALF UNIT  USE TO INJECT EVERY NIGHT     miconazole nitrate 2% 2 % Oint  Commonly known as: MICOTIN  Apply topically 2 (two) times daily.              "

## 2021-01-05 ENCOUNTER — OFFICE VISIT (OUTPATIENT)
Dept: NEPHROLOGY | Facility: CLINIC | Age: 76
End: 2021-01-05
Payer: MEDICARE

## 2021-01-05 VITALS
DIASTOLIC BLOOD PRESSURE: 80 MMHG | OXYGEN SATURATION: 98 % | SYSTOLIC BLOOD PRESSURE: 154 MMHG | BODY MASS INDEX: 45.91 KG/M2 | WEIGHT: 275.56 LBS | HEIGHT: 65 IN | HEART RATE: 75 BPM

## 2021-01-05 DIAGNOSIS — N17.9 AKI (ACUTE KIDNEY INJURY): ICD-10-CM

## 2021-01-05 DIAGNOSIS — N18.32 STAGE 3B CHRONIC KIDNEY DISEASE: Chronic | ICD-10-CM

## 2021-01-05 DIAGNOSIS — I10 HYPERTENSION, UNSPECIFIED TYPE: ICD-10-CM

## 2021-01-05 DIAGNOSIS — N18.30 STAGE 3 CHRONIC KIDNEY DISEASE, UNSPECIFIED WHETHER STAGE 3A OR 3B CKD: Primary | ICD-10-CM

## 2021-01-05 PROCEDURE — 1159F PR MEDICATION LIST DOCUMENTED IN MEDICAL RECORD: ICD-10-PCS | Mod: HCNC,S$GLB,, | Performed by: NURSE PRACTITIONER

## 2021-01-05 PROCEDURE — 1125F PR PAIN SEVERITY QUANTIFIED, PAIN PRESENT: ICD-10-PCS | Mod: HCNC,S$GLB,, | Performed by: NURSE PRACTITIONER

## 2021-01-05 PROCEDURE — 3077F SYST BP >= 140 MM HG: CPT | Mod: HCNC,CPTII,S$GLB, | Performed by: NURSE PRACTITIONER

## 2021-01-05 PROCEDURE — 3079F DIAST BP 80-89 MM HG: CPT | Mod: HCNC,CPTII,S$GLB, | Performed by: NURSE PRACTITIONER

## 2021-01-05 PROCEDURE — 99214 OFFICE O/P EST MOD 30 MIN: CPT | Mod: HCNC,S$GLB,, | Performed by: NURSE PRACTITIONER

## 2021-01-05 PROCEDURE — 3079F PR MOST RECENT DIASTOLIC BLOOD PRESSURE 80-89 MM HG: ICD-10-PCS | Mod: HCNC,CPTII,S$GLB, | Performed by: NURSE PRACTITIONER

## 2021-01-05 PROCEDURE — 1101F PR PT FALLS ASSESS DOC 0-1 FALLS W/OUT INJ PAST YR: ICD-10-PCS | Mod: HCNC,CPTII,S$GLB, | Performed by: NURSE PRACTITIONER

## 2021-01-05 PROCEDURE — 99499 UNLISTED E&M SERVICE: CPT | Mod: S$GLB,,, | Performed by: NURSE PRACTITIONER

## 2021-01-05 PROCEDURE — 1101F PT FALLS ASSESS-DOCD LE1/YR: CPT | Mod: HCNC,CPTII,S$GLB, | Performed by: NURSE PRACTITIONER

## 2021-01-05 PROCEDURE — 99214 PR OFFICE/OUTPT VISIT, EST, LEVL IV, 30-39 MIN: ICD-10-PCS | Mod: HCNC,S$GLB,, | Performed by: NURSE PRACTITIONER

## 2021-01-05 PROCEDURE — 99999 PR PBB SHADOW E&M-EST. PATIENT-LVL IV: CPT | Mod: PBBFAC,HCNC,, | Performed by: NURSE PRACTITIONER

## 2021-01-05 PROCEDURE — 1159F MED LIST DOCD IN RCRD: CPT | Mod: HCNC,S$GLB,, | Performed by: NURSE PRACTITIONER

## 2021-01-05 PROCEDURE — 3288F FALL RISK ASSESSMENT DOCD: CPT | Mod: HCNC,CPTII,S$GLB, | Performed by: NURSE PRACTITIONER

## 2021-01-05 PROCEDURE — 99499 RISK ADDL DX/OHS AUDIT: ICD-10-PCS | Mod: S$GLB,,, | Performed by: NURSE PRACTITIONER

## 2021-01-05 PROCEDURE — 3077F PR MOST RECENT SYSTOLIC BLOOD PRESSURE >= 140 MM HG: ICD-10-PCS | Mod: HCNC,CPTII,S$GLB, | Performed by: NURSE PRACTITIONER

## 2021-01-05 PROCEDURE — 3288F PR FALLS RISK ASSESSMENT DOCUMENTED: ICD-10-PCS | Mod: HCNC,CPTII,S$GLB, | Performed by: NURSE PRACTITIONER

## 2021-01-05 PROCEDURE — 1125F AMNT PAIN NOTED PAIN PRSNT: CPT | Mod: HCNC,S$GLB,, | Performed by: NURSE PRACTITIONER

## 2021-01-05 PROCEDURE — 99999 PR PBB SHADOW E&M-EST. PATIENT-LVL IV: ICD-10-PCS | Mod: PBBFAC,HCNC,, | Performed by: NURSE PRACTITIONER

## 2021-01-13 ENCOUNTER — HOSPITAL ENCOUNTER (OUTPATIENT)
Facility: HOSPITAL | Age: 76
Discharge: NURSING, REHAB OR LONG-TERM CARE | End: 2021-01-15
Attending: EMERGENCY MEDICINE | Admitting: ORTHOPAEDIC SURGERY
Payer: MEDICARE

## 2021-01-13 ENCOUNTER — PATIENT MESSAGE (OUTPATIENT)
Dept: WOUND CARE | Facility: CLINIC | Age: 76
End: 2021-01-13

## 2021-01-13 DIAGNOSIS — I13.0 BENIGN HYPERTENSIVE HEART AND KIDNEY DISEASE WITH HF AND CKD: ICD-10-CM

## 2021-01-13 DIAGNOSIS — Z79.4 TYPE 2 DIABETES MELLITUS WITH DIABETIC POLYNEUROPATHY, WITH LONG-TERM CURRENT USE OF INSULIN: ICD-10-CM

## 2021-01-13 DIAGNOSIS — Z91.81 RISK FOR FALLS: ICD-10-CM

## 2021-01-13 DIAGNOSIS — I50.32 CHRONIC HEART FAILURE WITH PRESERVED EJECTION FRACTION: ICD-10-CM

## 2021-01-13 DIAGNOSIS — E66.01 MORBID OBESITY WITH BODY MASS INDEX (BMI) GREATER THAN OR EQUAL TO 50: ICD-10-CM

## 2021-01-13 DIAGNOSIS — Z79.4 TYPE 2 DIABETES MELLITUS WITH HYPOGLYCEMIA WITHOUT COMA, WITH LONG-TERM CURRENT USE OF INSULIN: ICD-10-CM

## 2021-01-13 DIAGNOSIS — E11.42 TYPE 2 DIABETES MELLITUS WITH DIABETIC POLYNEUROPATHY, WITH LONG-TERM CURRENT USE OF INSULIN: ICD-10-CM

## 2021-01-13 DIAGNOSIS — S92.002D CLOSED NONDISPLACED FRACTURE OF LEFT CALCANEUS WITH ROUTINE HEALING, UNSPECIFIED PORTION OF CALCANEUS, SUBSEQUENT ENCOUNTER: ICD-10-CM

## 2021-01-13 DIAGNOSIS — Z01.818 PRE-OP EVALUATION: ICD-10-CM

## 2021-01-13 DIAGNOSIS — E11.649 TYPE 2 DIABETES MELLITUS WITH HYPOGLYCEMIA WITHOUT COMA, WITH LONG-TERM CURRENT USE OF INSULIN: ICD-10-CM

## 2021-01-13 DIAGNOSIS — E78.5 HYPERLIPIDEMIA LDL GOAL <100: Chronic | ICD-10-CM

## 2021-01-13 DIAGNOSIS — M85.80 OSTEOPENIA, UNSPECIFIED LOCATION: ICD-10-CM

## 2021-01-13 DIAGNOSIS — N18.31 STAGE 3A CHRONIC KIDNEY DISEASE: Chronic | ICD-10-CM

## 2021-01-13 DIAGNOSIS — S93.05XA: ICD-10-CM

## 2021-01-13 DIAGNOSIS — R53.81 DEBILITY: ICD-10-CM

## 2021-01-13 DIAGNOSIS — M14.672 CHARCOT ANKLE, LEFT: ICD-10-CM

## 2021-01-13 DIAGNOSIS — R26.89 UNABLE TO BEAR WEIGHT: Primary | ICD-10-CM

## 2021-01-13 DIAGNOSIS — I10 ESSENTIAL HYPERTENSION: Chronic | ICD-10-CM

## 2021-01-13 PROBLEM — D72.829 LEUKOCYTOSIS: Status: RESOLVED | Noted: 2019-03-22 | Resolved: 2021-01-13

## 2021-01-13 PROBLEM — M10.9 ACUTE GOUT OF LEFT HAND: Status: RESOLVED | Noted: 2019-05-09 | Resolved: 2021-01-13

## 2021-01-13 PROBLEM — S92.002A CALCANEUS FRACTURE, LEFT: Status: ACTIVE | Noted: 2021-01-13

## 2021-01-13 PROBLEM — R94.31 PROLONGED Q-T INTERVAL ON ECG: Status: RESOLVED | Noted: 2019-03-22 | Resolved: 2021-01-13

## 2021-01-13 PROBLEM — S46.911A RIGHT SHOULDER STRAIN, INITIAL ENCOUNTER: Status: RESOLVED | Noted: 2017-12-14 | Resolved: 2021-01-13

## 2021-01-13 PROBLEM — N39.0 URINARY TRACT INFECTION WITHOUT HEMATURIA: Status: RESOLVED | Noted: 2020-11-21 | Resolved: 2021-01-13

## 2021-01-13 PROBLEM — M25.532 LEFT WRIST PAIN: Status: RESOLVED | Noted: 2019-05-09 | Resolved: 2021-01-13

## 2021-01-13 PROBLEM — R73.9 HYPERGLYCEMIA: Status: RESOLVED | Noted: 2019-03-22 | Resolved: 2021-01-13

## 2021-01-13 LAB
ALBUMIN SERPL BCP-MCNC: 2.7 G/DL (ref 3.5–5.2)
ALP SERPL-CCNC: 238 U/L (ref 55–135)
ALT SERPL W/O P-5'-P-CCNC: 12 U/L (ref 10–44)
ANION GAP SERPL CALC-SCNC: 9 MMOL/L (ref 8–16)
AST SERPL-CCNC: 13 U/L (ref 10–40)
BASOPHILS # BLD AUTO: 0.02 K/UL (ref 0–0.2)
BASOPHILS NFR BLD: 0.4 % (ref 0–1.9)
BILIRUB SERPL-MCNC: 0.4 MG/DL (ref 0.1–1)
BUN SERPL-MCNC: 28 MG/DL (ref 8–23)
CALCIUM SERPL-MCNC: 9 MG/DL (ref 8.7–10.5)
CHLORIDE SERPL-SCNC: 107 MMOL/L (ref 95–110)
CO2 SERPL-SCNC: 23 MMOL/L (ref 23–29)
CREAT SERPL-MCNC: 1.5 MG/DL (ref 0.5–1.4)
CRP SERPL-MCNC: 46.8 MG/L (ref 0–8.2)
CTP QC/QA: YES
DIFFERENTIAL METHOD: ABNORMAL
EOSINOPHIL # BLD AUTO: 0 K/UL (ref 0–0.5)
EOSINOPHIL NFR BLD: 0.9 % (ref 0–8)
ERYTHROCYTE [DISTWIDTH] IN BLOOD BY AUTOMATED COUNT: 13.8 % (ref 11.5–14.5)
ERYTHROCYTE [SEDIMENTATION RATE] IN BLOOD BY WESTERGREN METHOD: 102 MM/HR (ref 0–36)
EST. GFR  (AFRICAN AMERICAN): 38.2 ML/MIN/1.73 M^2
EST. GFR  (NON AFRICAN AMERICAN): 33.1 ML/MIN/1.73 M^2
ESTIMATED AVG GLUCOSE: 209 MG/DL (ref 68–131)
GLUCOSE SERPL-MCNC: 130 MG/DL (ref 70–110)
HBA1C MFR BLD HPLC: 8.9 % (ref 4–5.6)
HCT VFR BLD AUTO: 32.3 % (ref 37–48.5)
HGB BLD-MCNC: 10.1 G/DL (ref 12–16)
IMM GRANULOCYTES # BLD AUTO: 0.03 K/UL (ref 0–0.04)
IMM GRANULOCYTES NFR BLD AUTO: 0.7 % (ref 0–0.5)
LYMPHOCYTES # BLD AUTO: 0.9 K/UL (ref 1–4.8)
LYMPHOCYTES NFR BLD: 19 % (ref 18–48)
MCH RBC QN AUTO: 28.5 PG (ref 27–31)
MCHC RBC AUTO-ENTMCNC: 31.3 G/DL (ref 32–36)
MCV RBC AUTO: 91 FL (ref 82–98)
MONOCYTES # BLD AUTO: 0.3 K/UL (ref 0.3–1)
MONOCYTES NFR BLD: 7.4 % (ref 4–15)
NEUTROPHILS # BLD AUTO: 3.2 K/UL (ref 1.8–7.7)
NEUTROPHILS NFR BLD: 71.6 % (ref 38–73)
NRBC BLD-RTO: 0 /100 WBC
PLATELET # BLD AUTO: 309 K/UL (ref 150–350)
PMV BLD AUTO: 8.8 FL (ref 9.2–12.9)
POCT GLUCOSE: 110 MG/DL (ref 70–110)
POCT GLUCOSE: 139 MG/DL (ref 70–110)
POTASSIUM SERPL-SCNC: 4.9 MMOL/L (ref 3.5–5.1)
PROT SERPL-MCNC: 7.4 G/DL (ref 6–8.4)
RBC # BLD AUTO: 3.54 M/UL (ref 4–5.4)
SARS-COV-2 RDRP RESP QL NAA+PROBE: NEGATIVE
SODIUM SERPL-SCNC: 139 MMOL/L (ref 136–145)
WBC # BLD AUTO: 4.47 K/UL (ref 3.9–12.7)

## 2021-01-13 PROCEDURE — 85025 COMPLETE CBC W/AUTO DIFF WBC: CPT

## 2021-01-13 PROCEDURE — 99285 EMERGENCY DEPT VISIT HI MDM: CPT | Mod: HCNC,CS,, | Performed by: PHYSICIAN ASSISTANT

## 2021-01-13 PROCEDURE — 96372 THER/PROPH/DIAG INJ SC/IM: CPT | Mod: 59

## 2021-01-13 PROCEDURE — 99220 PR INITIAL OBSERVATION CARE,LEVL III: ICD-10-PCS | Mod: HCNC,,, | Performed by: HOSPITALIST

## 2021-01-13 PROCEDURE — U0002 COVID-19 LAB TEST NON-CDC: HCPCS | Mod: HCNC | Performed by: PHYSICIAN ASSISTANT

## 2021-01-13 PROCEDURE — 93005 ELECTROCARDIOGRAM TRACING: CPT

## 2021-01-13 PROCEDURE — 93010 EKG 12-LEAD: ICD-10-PCS | Mod: ,,, | Performed by: INTERNAL MEDICINE

## 2021-01-13 PROCEDURE — 99285 EMERGENCY DEPT VISIT HI MDM: CPT | Mod: 25

## 2021-01-13 PROCEDURE — 25000003 PHARM REV CODE 250: Performed by: HOSPITALIST

## 2021-01-13 PROCEDURE — C9399 UNCLASSIFIED DRUGS OR BIOLOG: HCPCS | Performed by: HOSPITALIST

## 2021-01-13 PROCEDURE — 99220 PR INITIAL OBSERVATION CARE,LEVL III: CPT | Mod: HCNC,,, | Performed by: HOSPITALIST

## 2021-01-13 PROCEDURE — 99285 PR EMERGENCY DEPT VISIT,LEVEL V: ICD-10-PCS | Mod: HCNC,CS,, | Performed by: PHYSICIAN ASSISTANT

## 2021-01-13 PROCEDURE — 85652 RBC SED RATE AUTOMATED: CPT

## 2021-01-13 PROCEDURE — G0378 HOSPITAL OBSERVATION PER HR: HCPCS

## 2021-01-13 PROCEDURE — 83036 HEMOGLOBIN GLYCOSYLATED A1C: CPT

## 2021-01-13 PROCEDURE — 86140 C-REACTIVE PROTEIN: CPT

## 2021-01-13 PROCEDURE — 80053 COMPREHEN METABOLIC PANEL: CPT

## 2021-01-13 PROCEDURE — 93010 ELECTROCARDIOGRAM REPORT: CPT | Mod: ,,, | Performed by: INTERNAL MEDICINE

## 2021-01-13 RX ORDER — LIDOCAINE HYDROCHLORIDE 10 MG/ML
1 INJECTION, SOLUTION EPIDURAL; INFILTRATION; INTRACAUDAL; PERINEURAL ONCE
Status: DISCONTINUED | OUTPATIENT
Start: 2021-01-13 | End: 2021-01-15 | Stop reason: HOSPADM

## 2021-01-13 RX ORDER — ACETAMINOPHEN 325 MG/1
650 TABLET ORAL EVERY 4 HOURS PRN
Status: DISCONTINUED | OUTPATIENT
Start: 2021-01-13 | End: 2021-01-15 | Stop reason: HOSPADM

## 2021-01-13 RX ORDER — NAPROXEN SODIUM 220 MG/1
81 TABLET, FILM COATED ORAL DAILY
Status: DISCONTINUED | OUTPATIENT
Start: 2021-01-14 | End: 2021-01-15 | Stop reason: HOSPADM

## 2021-01-13 RX ORDER — ATORVASTATIN CALCIUM 10 MG/1
40 TABLET, FILM COATED ORAL NIGHTLY
Status: DISCONTINUED | OUTPATIENT
Start: 2021-01-13 | End: 2021-01-15 | Stop reason: HOSPADM

## 2021-01-13 RX ORDER — CLOPIDOGREL BISULFATE 75 MG/1
75 TABLET ORAL DAILY
Status: DISCONTINUED | OUTPATIENT
Start: 2021-01-14 | End: 2021-01-15 | Stop reason: HOSPADM

## 2021-01-13 RX ORDER — IBUPROFEN 200 MG
16 TABLET ORAL
Status: DISCONTINUED | OUTPATIENT
Start: 2021-01-13 | End: 2021-01-15 | Stop reason: HOSPADM

## 2021-01-13 RX ORDER — BUMETANIDE 1 MG/1
1 TABLET ORAL DAILY
Status: DISCONTINUED | OUTPATIENT
Start: 2021-01-14 | End: 2021-01-15 | Stop reason: HOSPADM

## 2021-01-13 RX ORDER — TALC
6 POWDER (GRAM) TOPICAL NIGHTLY PRN
Status: DISCONTINUED | OUTPATIENT
Start: 2021-01-13 | End: 2021-01-15 | Stop reason: HOSPADM

## 2021-01-13 RX ORDER — ACETAMINOPHEN 325 MG/1
650 TABLET ORAL EVERY 8 HOURS PRN
Status: DISCONTINUED | OUTPATIENT
Start: 2021-01-13 | End: 2021-01-15 | Stop reason: HOSPADM

## 2021-01-13 RX ORDER — SODIUM CHLORIDE 0.9 % (FLUSH) 0.9 %
10 SYRINGE (ML) INJECTION
Status: DISCONTINUED | OUTPATIENT
Start: 2021-01-13 | End: 2021-01-15 | Stop reason: HOSPADM

## 2021-01-13 RX ORDER — GLUCAGON 1 MG
1 KIT INJECTION
Status: DISCONTINUED | OUTPATIENT
Start: 2021-01-13 | End: 2021-01-15 | Stop reason: HOSPADM

## 2021-01-13 RX ORDER — INSULIN ASPART 100 [IU]/ML
1-10 INJECTION, SOLUTION INTRAVENOUS; SUBCUTANEOUS
Status: DISCONTINUED | OUTPATIENT
Start: 2021-01-13 | End: 2021-01-15 | Stop reason: HOSPADM

## 2021-01-13 RX ORDER — ONDANSETRON 8 MG/1
8 TABLET, ORALLY DISINTEGRATING ORAL EVERY 8 HOURS PRN
Status: DISCONTINUED | OUTPATIENT
Start: 2021-01-13 | End: 2021-01-15 | Stop reason: HOSPADM

## 2021-01-13 RX ORDER — IBUPROFEN 200 MG
24 TABLET ORAL
Status: DISCONTINUED | OUTPATIENT
Start: 2021-01-13 | End: 2021-01-15 | Stop reason: HOSPADM

## 2021-01-13 RX ORDER — OXYCODONE HYDROCHLORIDE 5 MG/1
5 TABLET ORAL EVERY 4 HOURS PRN
Status: DISCONTINUED | OUTPATIENT
Start: 2021-01-13 | End: 2021-01-15 | Stop reason: HOSPADM

## 2021-01-13 RX ADMIN — ATORVASTATIN CALCIUM 40 MG: 10 TABLET, FILM COATED ORAL at 09:01

## 2021-01-13 RX ADMIN — INSULIN DETEMIR 15 UNITS: 100 INJECTION, SOLUTION SUBCUTANEOUS at 09:01

## 2021-01-14 PROBLEM — E11.649 TYPE 2 DIABETES MELLITUS WITH HYPOGLYCEMIA WITHOUT COMA, WITH LONG-TERM CURRENT USE OF INSULIN: Status: ACTIVE | Noted: 2021-01-14

## 2021-01-14 PROBLEM — Z79.4 TYPE 2 DIABETES MELLITUS WITH HYPOGLYCEMIA WITHOUT COMA, WITH LONG-TERM CURRENT USE OF INSULIN: Status: ACTIVE | Noted: 2021-01-14

## 2021-01-14 LAB
ALBUMIN SERPL BCP-MCNC: 2.5 G/DL (ref 3.5–5.2)
ALP SERPL-CCNC: 211 U/L (ref 55–135)
ALT SERPL W/O P-5'-P-CCNC: 11 U/L (ref 10–44)
ANION GAP SERPL CALC-SCNC: 7 MMOL/L (ref 8–16)
AST SERPL-CCNC: 13 U/L (ref 10–40)
BACTERIA #/AREA URNS AUTO: ABNORMAL /HPF
BASOPHILS # BLD AUTO: 0.02 K/UL (ref 0–0.2)
BASOPHILS NFR BLD: 0.5 % (ref 0–1.9)
BILIRUB SERPL-MCNC: 0.3 MG/DL (ref 0.1–1)
BILIRUB UR QL STRIP: NEGATIVE
BUN SERPL-MCNC: 27 MG/DL (ref 8–23)
CALCIUM SERPL-MCNC: 9.1 MG/DL (ref 8.7–10.5)
CHLORIDE SERPL-SCNC: 105 MMOL/L (ref 95–110)
CLARITY UR REFRACT.AUTO: ABNORMAL
CO2 SERPL-SCNC: 27 MMOL/L (ref 23–29)
COLOR UR AUTO: ABNORMAL
CREAT SERPL-MCNC: 1.4 MG/DL (ref 0.5–1.4)
DIFFERENTIAL METHOD: ABNORMAL
EOSINOPHIL # BLD AUTO: 0.1 K/UL (ref 0–0.5)
EOSINOPHIL NFR BLD: 1.3 % (ref 0–8)
ERYTHROCYTE [DISTWIDTH] IN BLOOD BY AUTOMATED COUNT: 14.2 % (ref 11.5–14.5)
EST. GFR  (AFRICAN AMERICAN): 41.5 ML/MIN/1.73 M^2
EST. GFR  (NON AFRICAN AMERICAN): 36 ML/MIN/1.73 M^2
GLUCOSE SERPL-MCNC: 42 MG/DL (ref 70–110)
GLUCOSE UR QL STRIP: ABNORMAL
HCT VFR BLD AUTO: 31 % (ref 37–48.5)
HGB BLD-MCNC: 9.9 G/DL (ref 12–16)
HGB UR QL STRIP: ABNORMAL
HYALINE CASTS UR QL AUTO: 0 /LPF
IMM GRANULOCYTES # BLD AUTO: 0.01 K/UL (ref 0–0.04)
IMM GRANULOCYTES NFR BLD AUTO: 0.3 % (ref 0–0.5)
KETONES UR QL STRIP: NEGATIVE
LEUKOCYTE ESTERASE UR QL STRIP: ABNORMAL
LYMPHOCYTES # BLD AUTO: 1 K/UL (ref 1–4.8)
LYMPHOCYTES NFR BLD: 26.3 % (ref 18–48)
MCH RBC QN AUTO: 28.7 PG (ref 27–31)
MCHC RBC AUTO-ENTMCNC: 31.9 G/DL (ref 32–36)
MCV RBC AUTO: 90 FL (ref 82–98)
MICROSCOPIC COMMENT: ABNORMAL
MONOCYTES # BLD AUTO: 0.4 K/UL (ref 0.3–1)
MONOCYTES NFR BLD: 9.6 % (ref 4–15)
NEUTROPHILS # BLD AUTO: 2.5 K/UL (ref 1.8–7.7)
NEUTROPHILS NFR BLD: 62 % (ref 38–73)
NITRITE UR QL STRIP: NEGATIVE
NRBC BLD-RTO: 0 /100 WBC
PH UR STRIP: 5 [PH] (ref 5–8)
PLATELET # BLD AUTO: 294 K/UL (ref 150–350)
PMV BLD AUTO: 8.8 FL (ref 9.2–12.9)
POCT GLUCOSE: 144 MG/DL (ref 70–110)
POCT GLUCOSE: 205 MG/DL (ref 70–110)
POCT GLUCOSE: 210 MG/DL (ref 70–110)
POCT GLUCOSE: 248 MG/DL (ref 70–110)
POCT GLUCOSE: 35 MG/DL (ref 70–110)
POCT GLUCOSE: 96 MG/DL (ref 70–110)
POTASSIUM SERPL-SCNC: 4.6 MMOL/L (ref 3.5–5.1)
PROT SERPL-MCNC: 6.8 G/DL (ref 6–8.4)
PROT UR QL STRIP: ABNORMAL
RBC # BLD AUTO: 3.45 M/UL (ref 4–5.4)
RBC #/AREA URNS AUTO: 1 /HPF (ref 0–4)
SODIUM SERPL-SCNC: 139 MMOL/L (ref 136–145)
SP GR UR STRIP: 1.01 (ref 1–1.03)
SQUAMOUS #/AREA URNS AUTO: 0 /HPF
URN SPEC COLLECT METH UR: ABNORMAL
WBC # BLD AUTO: 3.96 K/UL (ref 3.9–12.7)
WBC #/AREA URNS AUTO: 20 /HPF (ref 0–5)

## 2021-01-14 PROCEDURE — 87088 URINE BACTERIA CULTURE: CPT

## 2021-01-14 PROCEDURE — 97165 OT EVAL LOW COMPLEX 30 MIN: CPT

## 2021-01-14 PROCEDURE — 25000003 PHARM REV CODE 250: Performed by: INTERNAL MEDICINE

## 2021-01-14 PROCEDURE — G0378 HOSPITAL OBSERVATION PER HR: HCPCS

## 2021-01-14 PROCEDURE — 63600175 PHARM REV CODE 636 W HCPCS: Performed by: HOSPITALIST

## 2021-01-14 PROCEDURE — 97535 SELF CARE MNGMENT TRAINING: CPT

## 2021-01-14 PROCEDURE — 87086 URINE CULTURE/COLONY COUNT: CPT

## 2021-01-14 PROCEDURE — C9399 UNCLASSIFIED DRUGS OR BIOLOG: HCPCS | Performed by: INTERNAL MEDICINE

## 2021-01-14 PROCEDURE — 99225 PR SUBSEQUENT OBSERVATION CARE,LEVEL II: ICD-10-PCS | Mod: ,,, | Performed by: INTERNAL MEDICINE

## 2021-01-14 PROCEDURE — 87077 CULTURE AEROBIC IDENTIFY: CPT

## 2021-01-14 PROCEDURE — 87186 SC STD MICRODIL/AGAR DIL: CPT

## 2021-01-14 PROCEDURE — 96372 THER/PROPH/DIAG INJ SC/IM: CPT | Mod: 59

## 2021-01-14 PROCEDURE — 36415 COLL VENOUS BLD VENIPUNCTURE: CPT

## 2021-01-14 PROCEDURE — 96374 THER/PROPH/DIAG INJ IV PUSH: CPT

## 2021-01-14 PROCEDURE — 80053 COMPREHEN METABOLIC PANEL: CPT

## 2021-01-14 PROCEDURE — 81001 URINALYSIS AUTO W/SCOPE: CPT

## 2021-01-14 PROCEDURE — 25000003 PHARM REV CODE 250: Performed by: HOSPITALIST

## 2021-01-14 PROCEDURE — 85025 COMPLETE CBC W/AUTO DIFF WBC: CPT

## 2021-01-14 PROCEDURE — 99225 PR SUBSEQUENT OBSERVATION CARE,LEVEL II: CPT | Mod: ,,, | Performed by: INTERNAL MEDICINE

## 2021-01-14 RX ORDER — HYDRALAZINE HYDROCHLORIDE 25 MG/1
25 TABLET, FILM COATED ORAL EVERY 8 HOURS PRN
Status: DISCONTINUED | OUTPATIENT
Start: 2021-01-14 | End: 2021-01-15 | Stop reason: HOSPADM

## 2021-01-14 RX ADMIN — INSULIN ASPART 2 UNITS: 100 INJECTION, SOLUTION INTRAVENOUS; SUBCUTANEOUS at 12:01

## 2021-01-14 RX ADMIN — INSULIN ASPART 4 UNITS: 100 INJECTION, SOLUTION INTRAVENOUS; SUBCUTANEOUS at 05:01

## 2021-01-14 RX ADMIN — ASPIRIN 81 MG: 81 TABLET, CHEWABLE ORAL at 10:01

## 2021-01-14 RX ADMIN — DEXTROSE MONOHYDRATE 25 G: 25 INJECTION, SOLUTION INTRAVENOUS at 05:01

## 2021-01-14 RX ADMIN — BUMETANIDE 1 MG: 1 TABLET ORAL at 10:01

## 2021-01-14 RX ADMIN — ATORVASTATIN CALCIUM 40 MG: 10 TABLET, FILM COATED ORAL at 09:01

## 2021-01-14 RX ADMIN — CLOPIDOGREL 75 MG: 75 TABLET, FILM COATED ORAL at 10:01

## 2021-01-14 RX ADMIN — INSULIN DETEMIR 10 UNITS: 100 INJECTION, SOLUTION SUBCUTANEOUS at 09:01

## 2021-01-15 ENCOUNTER — HOSPITAL ENCOUNTER (INPATIENT)
Facility: HOSPITAL | Age: 76
LOS: 19 days | Discharge: HOME HEALTH | DRG: 554 | End: 2021-02-03
Attending: HOSPITALIST | Admitting: ORTHOPAEDIC SURGERY
Payer: MEDICARE

## 2021-01-15 VITALS
DIASTOLIC BLOOD PRESSURE: 78 MMHG | SYSTOLIC BLOOD PRESSURE: 147 MMHG | WEIGHT: 293 LBS | OXYGEN SATURATION: 97 % | HEIGHT: 65 IN | RESPIRATION RATE: 19 BRPM | TEMPERATURE: 98 F | BODY MASS INDEX: 48.82 KG/M2 | HEART RATE: 72 BPM

## 2021-01-15 DIAGNOSIS — R26.89 UNABLE TO BEAR WEIGHT: ICD-10-CM

## 2021-01-15 DIAGNOSIS — M14.672 CHARCOT ANKLE, LEFT: ICD-10-CM

## 2021-01-15 LAB
POCT GLUCOSE: 110 MG/DL (ref 70–110)
POCT GLUCOSE: 156 MG/DL (ref 70–110)
POCT GLUCOSE: 196 MG/DL (ref 70–110)
POCT GLUCOSE: 208 MG/DL (ref 70–110)
SARS-COV-2 RNA RESP QL NAA+PROBE: NOT DETECTED

## 2021-01-15 PROCEDURE — 25000003 PHARM REV CODE 250: Performed by: HOSPITALIST

## 2021-01-15 PROCEDURE — 99225 PR SUBSEQUENT OBSERVATION CARE,LEVEL II: CPT | Mod: ,,, | Performed by: INTERNAL MEDICINE

## 2021-01-15 PROCEDURE — 11000004 HC SNF PRIVATE

## 2021-01-15 PROCEDURE — 99204 PR OFFICE/OUTPT VISIT, NEW, LEVL IV, 45-59 MIN: ICD-10-PCS | Mod: ,,, | Performed by: ORTHOPAEDIC SURGERY

## 2021-01-15 PROCEDURE — G0378 HOSPITAL OBSERVATION PER HR: HCPCS

## 2021-01-15 PROCEDURE — C9399 UNCLASSIFIED DRUGS OR BIOLOG: HCPCS | Performed by: STUDENT IN AN ORGANIZED HEALTH CARE EDUCATION/TRAINING PROGRAM

## 2021-01-15 PROCEDURE — 25000003 PHARM REV CODE 250: Performed by: STUDENT IN AN ORGANIZED HEALTH CARE EDUCATION/TRAINING PROGRAM

## 2021-01-15 PROCEDURE — 99225 PR SUBSEQUENT OBSERVATION CARE,LEVEL II: ICD-10-PCS | Mod: ,,, | Performed by: INTERNAL MEDICINE

## 2021-01-15 PROCEDURE — U0003 INFECTIOUS AGENT DETECTION BY NUCLEIC ACID (DNA OR RNA); SEVERE ACUTE RESPIRATORY SYNDROME CORONAVIRUS 2 (SARS-COV-2) (CORONAVIRUS DISEASE [COVID-19]), AMPLIFIED PROBE TECHNIQUE, MAKING USE OF HIGH THROUGHPUT TECHNOLOGIES AS DESCRIBED BY CMS-2020-01-R: HCPCS

## 2021-01-15 PROCEDURE — 99204 OFFICE O/P NEW MOD 45 MIN: CPT | Mod: ,,, | Performed by: ORTHOPAEDIC SURGERY

## 2021-01-15 RX ORDER — INSULIN ASPART 100 [IU]/ML
1-10 INJECTION, SOLUTION INTRAVENOUS; SUBCUTANEOUS
Status: DISCONTINUED | OUTPATIENT
Start: 2021-01-15 | End: 2021-02-03 | Stop reason: HOSPADM

## 2021-01-15 RX ORDER — AMOXICILLIN 250 MG
1 CAPSULE ORAL 2 TIMES DAILY
Status: DISCONTINUED | OUTPATIENT
Start: 2021-01-15 | End: 2021-02-03 | Stop reason: HOSPADM

## 2021-01-15 RX ORDER — ACETAMINOPHEN 325 MG/1
650 TABLET ORAL EVERY 6 HOURS PRN
Status: DISCONTINUED | OUTPATIENT
Start: 2021-01-15 | End: 2021-02-03 | Stop reason: HOSPADM

## 2021-01-15 RX ORDER — BUMETANIDE 1 MG/1
2 TABLET ORAL DAILY
Status: CANCELLED | OUTPATIENT
Start: 2021-01-16

## 2021-01-15 RX ORDER — BUMETANIDE 1 MG/1
2 TABLET ORAL DAILY
Status: DISCONTINUED | OUTPATIENT
Start: 2021-01-16 | End: 2021-01-18

## 2021-01-15 RX ORDER — NAPROXEN SODIUM 220 MG/1
81 TABLET, FILM COATED ORAL DAILY
Status: DISCONTINUED | OUTPATIENT
Start: 2021-01-16 | End: 2021-02-03 | Stop reason: HOSPADM

## 2021-01-15 RX ORDER — CALCIUM CARBONATE 200(500)MG
500 TABLET,CHEWABLE ORAL 2 TIMES DAILY PRN
Status: DISCONTINUED | OUTPATIENT
Start: 2021-01-15 | End: 2021-02-03 | Stop reason: HOSPADM

## 2021-01-15 RX ORDER — CLOPIDOGREL BISULFATE 75 MG/1
75 TABLET ORAL DAILY
Status: DISCONTINUED | OUTPATIENT
Start: 2021-01-16 | End: 2021-02-03 | Stop reason: HOSPADM

## 2021-01-15 RX ORDER — MUPIROCIN 20 MG/G
OINTMENT TOPICAL 2 TIMES DAILY
Status: COMPLETED | OUTPATIENT
Start: 2021-01-15 | End: 2021-01-20

## 2021-01-15 RX ORDER — TALC
6 POWDER (GRAM) TOPICAL NIGHTLY PRN
Status: DISCONTINUED | OUTPATIENT
Start: 2021-01-15 | End: 2021-01-19

## 2021-01-15 RX ORDER — CLOPIDOGREL BISULFATE 75 MG/1
75 TABLET ORAL DAILY
Status: CANCELLED | OUTPATIENT
Start: 2021-01-16

## 2021-01-15 RX ORDER — TALC
6 POWDER (GRAM) TOPICAL NIGHTLY PRN
Status: CANCELLED | OUTPATIENT
Start: 2021-01-15

## 2021-01-15 RX ORDER — ATORVASTATIN CALCIUM 10 MG/1
40 TABLET, FILM COATED ORAL NIGHTLY
Status: CANCELLED | OUTPATIENT
Start: 2021-01-15

## 2021-01-15 RX ORDER — ACETAMINOPHEN 325 MG/1
650 TABLET ORAL EVERY 6 HOURS PRN
Status: CANCELLED | OUTPATIENT
Start: 2021-01-15

## 2021-01-15 RX ORDER — INSULIN ASPART 100 [IU]/ML
1-10 INJECTION, SOLUTION INTRAVENOUS; SUBCUTANEOUS
Status: CANCELLED | OUTPATIENT
Start: 2021-01-15

## 2021-01-15 RX ORDER — CALCIUM CARBONATE 200(500)MG
500 TABLET,CHEWABLE ORAL 2 TIMES DAILY PRN
Status: CANCELLED | OUTPATIENT
Start: 2021-01-15

## 2021-01-15 RX ORDER — AMOXICILLIN 250 MG
1 CAPSULE ORAL 2 TIMES DAILY
Status: CANCELLED | OUTPATIENT
Start: 2021-01-15

## 2021-01-15 RX ORDER — ATORVASTATIN CALCIUM 20 MG/1
40 TABLET, FILM COATED ORAL NIGHTLY
Status: DISCONTINUED | OUTPATIENT
Start: 2021-01-15 | End: 2021-02-03 | Stop reason: HOSPADM

## 2021-01-15 RX ORDER — NAPROXEN SODIUM 220 MG/1
81 TABLET, FILM COATED ORAL DAILY
Status: CANCELLED | OUTPATIENT
Start: 2021-01-16

## 2021-01-15 RX ADMIN — MUPIROCIN: 20 OINTMENT TOPICAL at 10:01

## 2021-01-15 RX ADMIN — DOCUSATE SODIUM 50MG AND SENNOSIDES 8.6MG 1 TABLET: 8.6; 5 TABLET, FILM COATED ORAL at 10:01

## 2021-01-15 RX ADMIN — OXYCODONE 5 MG: 5 TABLET ORAL at 11:01

## 2021-01-15 RX ADMIN — INSULIN DETEMIR 10 UNITS: 100 INJECTION, SOLUTION SUBCUTANEOUS at 10:01

## 2021-01-15 RX ADMIN — CLOPIDOGREL 75 MG: 75 TABLET, FILM COATED ORAL at 09:01

## 2021-01-15 RX ADMIN — ATORVASTATIN CALCIUM 40 MG: 20 TABLET, FILM COATED ORAL at 10:01

## 2021-01-15 RX ADMIN — ASPIRIN 81 MG: 81 TABLET, CHEWABLE ORAL at 09:01

## 2021-01-15 RX ADMIN — BUMETANIDE 1 MG: 1 TABLET ORAL at 09:01

## 2021-01-16 LAB
POCT GLUCOSE: 117 MG/DL (ref 70–110)
POCT GLUCOSE: 135 MG/DL (ref 70–110)
POCT GLUCOSE: 159 MG/DL (ref 70–110)
POCT GLUCOSE: 180 MG/DL (ref 70–110)

## 2021-01-16 PROCEDURE — 63600175 PHARM REV CODE 636 W HCPCS: Performed by: STUDENT IN AN ORGANIZED HEALTH CARE EDUCATION/TRAINING PROGRAM

## 2021-01-16 PROCEDURE — 97162 PT EVAL MOD COMPLEX 30 MIN: CPT

## 2021-01-16 PROCEDURE — 25000003 PHARM REV CODE 250: Performed by: STUDENT IN AN ORGANIZED HEALTH CARE EDUCATION/TRAINING PROGRAM

## 2021-01-16 PROCEDURE — 25000003 PHARM REV CODE 250: Performed by: NURSE PRACTITIONER

## 2021-01-16 PROCEDURE — 97110 THERAPEUTIC EXERCISES: CPT

## 2021-01-16 PROCEDURE — 11000004 HC SNF PRIVATE

## 2021-01-16 RX ORDER — OXYCODONE HYDROCHLORIDE 5 MG/1
5 TABLET ORAL EVERY 6 HOURS PRN
Status: DISCONTINUED | OUTPATIENT
Start: 2021-01-16 | End: 2021-02-03 | Stop reason: HOSPADM

## 2021-01-16 RX ORDER — MICONAZOLE NITRATE 2 %
POWDER (GRAM) TOPICAL 2 TIMES DAILY
Status: DISCONTINUED | OUTPATIENT
Start: 2021-01-16 | End: 2021-01-19

## 2021-01-16 RX ADMIN — INSULIN ASPART 2 UNITS: 100 INJECTION, SOLUTION INTRAVENOUS; SUBCUTANEOUS at 05:01

## 2021-01-16 RX ADMIN — DOCUSATE SODIUM 50MG AND SENNOSIDES 8.6MG 1 TABLET: 8.6; 5 TABLET, FILM COATED ORAL at 09:01

## 2021-01-16 RX ADMIN — BUMETANIDE 2 MG: 1 TABLET ORAL at 08:01

## 2021-01-16 RX ADMIN — MICONAZOLE NITRATE: 20 POWDER TOPICAL at 09:01

## 2021-01-16 RX ADMIN — MUPIROCIN: 20 OINTMENT TOPICAL at 08:01

## 2021-01-16 RX ADMIN — CLOPIDOGREL 75 MG: 75 TABLET, FILM COATED ORAL at 08:01

## 2021-01-16 RX ADMIN — MUPIROCIN: 20 OINTMENT TOPICAL at 09:01

## 2021-01-16 RX ADMIN — INSULIN ASPART 1 UNITS: 100 INJECTION, SOLUTION INTRAVENOUS; SUBCUTANEOUS at 09:01

## 2021-01-16 RX ADMIN — ASPIRIN 81 MG CHEWABLE TABLET 81 MG: 81 TABLET CHEWABLE at 08:01

## 2021-01-16 RX ADMIN — MICONAZOLE NITRATE: 20 POWDER TOPICAL at 12:01

## 2021-01-16 RX ADMIN — DOCUSATE SODIUM 50MG AND SENNOSIDES 8.6MG 1 TABLET: 8.6; 5 TABLET, FILM COATED ORAL at 08:01

## 2021-01-16 RX ADMIN — INSULIN DETEMIR 10 UNITS: 100 INJECTION, SOLUTION SUBCUTANEOUS at 09:01

## 2021-01-16 RX ADMIN — ATORVASTATIN CALCIUM 40 MG: 20 TABLET, FILM COATED ORAL at 09:01

## 2021-01-17 LAB
BACTERIA UR CULT: ABNORMAL
POCT GLUCOSE: 119 MG/DL (ref 70–110)
POCT GLUCOSE: 196 MG/DL (ref 70–110)
POCT GLUCOSE: 218 MG/DL (ref 70–110)
POCT GLUCOSE: 225 MG/DL (ref 70–110)

## 2021-01-17 PROCEDURE — 97535 SELF CARE MNGMENT TRAINING: CPT

## 2021-01-17 PROCEDURE — 11000004 HC SNF PRIVATE

## 2021-01-17 PROCEDURE — 97165 OT EVAL LOW COMPLEX 30 MIN: CPT

## 2021-01-17 PROCEDURE — 25000003 PHARM REV CODE 250: Performed by: STUDENT IN AN ORGANIZED HEALTH CARE EDUCATION/TRAINING PROGRAM

## 2021-01-17 RX ADMIN — MICONAZOLE NITRATE: 20 POWDER TOPICAL at 10:01

## 2021-01-17 RX ADMIN — MICONAZOLE NITRATE: 20 POWDER TOPICAL at 09:01

## 2021-01-17 RX ADMIN — INSULIN DETEMIR 10 UNITS: 100 INJECTION, SOLUTION SUBCUTANEOUS at 09:01

## 2021-01-17 RX ADMIN — ATORVASTATIN CALCIUM 40 MG: 20 TABLET, FILM COATED ORAL at 09:01

## 2021-01-17 RX ADMIN — INSULIN ASPART 2 UNITS: 100 INJECTION, SOLUTION INTRAVENOUS; SUBCUTANEOUS at 09:01

## 2021-01-17 RX ADMIN — INSULIN ASPART 2 UNITS: 100 INJECTION, SOLUTION INTRAVENOUS; SUBCUTANEOUS at 05:01

## 2021-01-17 RX ADMIN — BUMETANIDE 2 MG: 1 TABLET ORAL at 08:01

## 2021-01-17 RX ADMIN — DOCUSATE SODIUM 50MG AND SENNOSIDES 8.6MG 1 TABLET: 8.6; 5 TABLET, FILM COATED ORAL at 08:01

## 2021-01-17 RX ADMIN — MUPIROCIN: 20 OINTMENT TOPICAL at 08:01

## 2021-01-17 RX ADMIN — MUPIROCIN: 20 OINTMENT TOPICAL at 09:01

## 2021-01-17 RX ADMIN — INSULIN ASPART 4 UNITS: 100 INJECTION, SOLUTION INTRAVENOUS; SUBCUTANEOUS at 12:01

## 2021-01-17 RX ADMIN — CLOPIDOGREL 75 MG: 75 TABLET, FILM COATED ORAL at 08:01

## 2021-01-17 RX ADMIN — ASPIRIN 81 MG CHEWABLE TABLET 81 MG: 81 TABLET CHEWABLE at 08:01

## 2021-01-18 LAB
ANION GAP SERPL CALC-SCNC: 13 MMOL/L (ref 8–16)
BASOPHILS # BLD AUTO: 0.02 K/UL (ref 0–0.2)
BASOPHILS NFR BLD: 0.3 % (ref 0–1.9)
BUN SERPL-MCNC: 48 MG/DL (ref 8–23)
CALCIUM SERPL-MCNC: 9.1 MG/DL (ref 8.7–10.5)
CHLORIDE SERPL-SCNC: 100 MMOL/L (ref 95–110)
CO2 SERPL-SCNC: 24 MMOL/L (ref 23–29)
CREAT SERPL-MCNC: 2 MG/DL (ref 0.5–1.4)
DIFFERENTIAL METHOD: ABNORMAL
EOSINOPHIL # BLD AUTO: 0 K/UL (ref 0–0.5)
EOSINOPHIL NFR BLD: 0.5 % (ref 0–8)
ERYTHROCYTE [DISTWIDTH] IN BLOOD BY AUTOMATED COUNT: 13.6 % (ref 11.5–14.5)
EST. GFR  (AFRICAN AMERICAN): 27 ML/MIN/1.73 M^2
EST. GFR  (NON AFRICAN AMERICAN): 23.4 ML/MIN/1.73 M^2
GLUCOSE SERPL-MCNC: 152 MG/DL (ref 70–110)
HCT VFR BLD AUTO: 32.8 % (ref 37–48.5)
HGB BLD-MCNC: 10.4 G/DL (ref 12–16)
IMM GRANULOCYTES # BLD AUTO: 0.02 K/UL (ref 0–0.04)
IMM GRANULOCYTES NFR BLD AUTO: 0.3 % (ref 0–0.5)
LYMPHOCYTES # BLD AUTO: 0.9 K/UL (ref 1–4.8)
LYMPHOCYTES NFR BLD: 15.7 % (ref 18–48)
MAGNESIUM SERPL-MCNC: 1.8 MG/DL (ref 1.6–2.6)
MCH RBC QN AUTO: 28.5 PG (ref 27–31)
MCHC RBC AUTO-ENTMCNC: 31.7 G/DL (ref 32–36)
MCV RBC AUTO: 90 FL (ref 82–98)
MONOCYTES # BLD AUTO: 0.7 K/UL (ref 0.3–1)
MONOCYTES NFR BLD: 11.9 % (ref 4–15)
NEUTROPHILS # BLD AUTO: 4.3 K/UL (ref 1.8–7.7)
NEUTROPHILS NFR BLD: 71.3 % (ref 38–73)
NRBC BLD-RTO: 0 /100 WBC
PHOSPHATE SERPL-MCNC: 3.6 MG/DL (ref 2.7–4.5)
PLATELET # BLD AUTO: 289 K/UL (ref 150–350)
PMV BLD AUTO: 9.5 FL (ref 9.2–12.9)
POCT GLUCOSE: 141 MG/DL (ref 70–110)
POCT GLUCOSE: 169 MG/DL (ref 70–110)
POCT GLUCOSE: 169 MG/DL (ref 70–110)
POCT GLUCOSE: 218 MG/DL (ref 70–110)
POTASSIUM SERPL-SCNC: 4.7 MMOL/L (ref 3.5–5.1)
RBC # BLD AUTO: 3.65 M/UL (ref 4–5.4)
SODIUM SERPL-SCNC: 137 MMOL/L (ref 136–145)
WBC # BLD AUTO: 5.99 K/UL (ref 3.9–12.7)

## 2021-01-18 PROCEDURE — 99306 PR NURSING FACILITY CARE, INIT, HIGH SEVERITY: ICD-10-PCS | Mod: ,,, | Performed by: HOSPITALIST

## 2021-01-18 PROCEDURE — 84100 ASSAY OF PHOSPHORUS: CPT

## 2021-01-18 PROCEDURE — 11000004 HC SNF PRIVATE

## 2021-01-18 PROCEDURE — 85025 COMPLETE CBC W/AUTO DIFF WBC: CPT

## 2021-01-18 PROCEDURE — 80048 BASIC METABOLIC PNL TOTAL CA: CPT

## 2021-01-18 PROCEDURE — 97802 MEDICAL NUTRITION INDIV IN: CPT

## 2021-01-18 PROCEDURE — 99306 1ST NF CARE HIGH MDM 50: CPT | Mod: ,,, | Performed by: HOSPITALIST

## 2021-01-18 PROCEDURE — 83735 ASSAY OF MAGNESIUM: CPT

## 2021-01-18 PROCEDURE — 25000003 PHARM REV CODE 250: Performed by: STUDENT IN AN ORGANIZED HEALTH CARE EDUCATION/TRAINING PROGRAM

## 2021-01-18 RX ADMIN — MUPIROCIN: 20 OINTMENT TOPICAL at 08:01

## 2021-01-18 RX ADMIN — INSULIN ASPART 2 UNITS: 100 INJECTION, SOLUTION INTRAVENOUS; SUBCUTANEOUS at 06:01

## 2021-01-18 RX ADMIN — MICONAZOLE NITRATE: 20 POWDER TOPICAL at 08:01

## 2021-01-18 RX ADMIN — ASPIRIN 81 MG CHEWABLE TABLET 81 MG: 81 TABLET CHEWABLE at 08:01

## 2021-01-18 RX ADMIN — BUMETANIDE 2 MG: 1 TABLET ORAL at 08:01

## 2021-01-18 RX ADMIN — CLOPIDOGREL 75 MG: 75 TABLET, FILM COATED ORAL at 08:01

## 2021-01-18 RX ADMIN — INSULIN DETEMIR 10 UNITS: 100 INJECTION, SOLUTION SUBCUTANEOUS at 08:01

## 2021-01-18 RX ADMIN — ATORVASTATIN CALCIUM 40 MG: 20 TABLET, FILM COATED ORAL at 08:01

## 2021-01-18 RX ADMIN — INSULIN ASPART 2 UNITS: 100 INJECTION, SOLUTION INTRAVENOUS; SUBCUTANEOUS at 12:01

## 2021-01-18 RX ADMIN — INSULIN ASPART 2 UNITS: 100 INJECTION, SOLUTION INTRAVENOUS; SUBCUTANEOUS at 08:01

## 2021-01-18 RX ADMIN — MELATONIN TAB 3 MG 6 MG: 3 TAB at 08:01

## 2021-01-19 ENCOUNTER — LAB VISIT (OUTPATIENT)
Dept: LAB | Facility: OTHER | Age: 76
End: 2021-01-19
Payer: MEDICARE

## 2021-01-19 DIAGNOSIS — Z20.822 ENCOUNTER FOR LABORATORY TESTING FOR COVID-19 VIRUS: ICD-10-CM

## 2021-01-19 LAB
POCT GLUCOSE: 138 MG/DL (ref 70–110)
POCT GLUCOSE: 163 MG/DL (ref 70–110)
POCT GLUCOSE: 183 MG/DL (ref 70–110)
POCT GLUCOSE: 200 MG/DL (ref 70–110)

## 2021-01-19 PROCEDURE — 97535 SELF CARE MNGMENT TRAINING: CPT | Mod: CO

## 2021-01-19 PROCEDURE — 97110 THERAPEUTIC EXERCISES: CPT | Mod: CQ

## 2021-01-19 PROCEDURE — 97530 THERAPEUTIC ACTIVITIES: CPT | Mod: CQ

## 2021-01-19 PROCEDURE — 25000003 PHARM REV CODE 250: Performed by: STUDENT IN AN ORGANIZED HEALTH CARE EDUCATION/TRAINING PROGRAM

## 2021-01-19 PROCEDURE — 97110 THERAPEUTIC EXERCISES: CPT | Mod: CO

## 2021-01-19 PROCEDURE — U0003 INFECTIOUS AGENT DETECTION BY NUCLEIC ACID (DNA OR RNA); SEVERE ACUTE RESPIRATORY SYNDROME CORONAVIRUS 2 (SARS-COV-2) (CORONAVIRUS DISEASE [COVID-19]), AMPLIFIED PROBE TECHNIQUE, MAKING USE OF HIGH THROUGHPUT TECHNOLOGIES AS DESCRIBED BY CMS-2020-01-R: HCPCS

## 2021-01-19 PROCEDURE — 25000003 PHARM REV CODE 250: Performed by: NURSE PRACTITIONER

## 2021-01-19 PROCEDURE — 11000004 HC SNF PRIVATE

## 2021-01-19 RX ADMIN — ATORVASTATIN CALCIUM 40 MG: 20 TABLET, FILM COATED ORAL at 09:01

## 2021-01-19 RX ADMIN — MUPIROCIN: 20 OINTMENT TOPICAL at 08:01

## 2021-01-19 RX ADMIN — INSULIN DETEMIR 10 UNITS: 100 INJECTION, SOLUTION SUBCUTANEOUS at 09:01

## 2021-01-19 RX ADMIN — INSULIN ASPART 2 UNITS: 100 INJECTION, SOLUTION INTRAVENOUS; SUBCUTANEOUS at 06:01

## 2021-01-19 RX ADMIN — CLOPIDOGREL 75 MG: 75 TABLET, FILM COATED ORAL at 08:01

## 2021-01-19 RX ADMIN — MICONAZOLE NITRATE: 20 OINTMENT TOPICAL at 09:01

## 2021-01-19 RX ADMIN — INSULIN ASPART 1 UNITS: 100 INJECTION, SOLUTION INTRAVENOUS; SUBCUTANEOUS at 09:01

## 2021-01-19 RX ADMIN — MUPIROCIN: 20 OINTMENT TOPICAL at 09:01

## 2021-01-19 RX ADMIN — INSULIN ASPART 2 UNITS: 100 INJECTION, SOLUTION INTRAVENOUS; SUBCUTANEOUS at 12:01

## 2021-01-19 RX ADMIN — ASPIRIN 81 MG CHEWABLE TABLET 81 MG: 81 TABLET CHEWABLE at 08:01

## 2021-01-19 RX ADMIN — MICONAZOLE NITRATE: 20 POWDER TOPICAL at 08:01

## 2021-01-20 LAB
ANION GAP SERPL CALC-SCNC: 10 MMOL/L (ref 8–16)
BUN SERPL-MCNC: 58 MG/DL (ref 8–23)
CALCIUM SERPL-MCNC: 9.2 MG/DL (ref 8.7–10.5)
CHLORIDE SERPL-SCNC: 98 MMOL/L (ref 95–110)
CO2 SERPL-SCNC: 26 MMOL/L (ref 23–29)
CREAT SERPL-MCNC: 2.1 MG/DL (ref 0.5–1.4)
EST. GFR  (AFRICAN AMERICAN): 25.4 ML/MIN/1.73 M^2
EST. GFR  (NON AFRICAN AMERICAN): 22.1 ML/MIN/1.73 M^2
GLUCOSE SERPL-MCNC: 174 MG/DL (ref 70–110)
POCT GLUCOSE: 146 MG/DL (ref 70–110)
POCT GLUCOSE: 173 MG/DL (ref 70–110)
POCT GLUCOSE: 210 MG/DL (ref 70–110)
POCT GLUCOSE: 278 MG/DL (ref 70–110)
POTASSIUM SERPL-SCNC: 3.9 MMOL/L (ref 3.5–5.1)
SARS-COV-2 RNA RESP QL NAA+PROBE: NOT DETECTED
SODIUM SERPL-SCNC: 134 MMOL/L (ref 136–145)

## 2021-01-20 PROCEDURE — 11000004 HC SNF PRIVATE

## 2021-01-20 PROCEDURE — 97530 THERAPEUTIC ACTIVITIES: CPT | Mod: CQ

## 2021-01-20 PROCEDURE — 97535 SELF CARE MNGMENT TRAINING: CPT | Mod: CO

## 2021-01-20 PROCEDURE — 25000003 PHARM REV CODE 250: Performed by: STUDENT IN AN ORGANIZED HEALTH CARE EDUCATION/TRAINING PROGRAM

## 2021-01-20 PROCEDURE — 97110 THERAPEUTIC EXERCISES: CPT | Mod: CQ

## 2021-01-20 PROCEDURE — 36415 COLL VENOUS BLD VENIPUNCTURE: CPT

## 2021-01-20 PROCEDURE — 80048 BASIC METABOLIC PNL TOTAL CA: CPT

## 2021-01-20 PROCEDURE — 25000003 PHARM REV CODE 250: Performed by: NURSE PRACTITIONER

## 2021-01-20 RX ORDER — SODIUM CHLORIDE 9 MG/ML
INJECTION, SOLUTION INTRAVENOUS CONTINUOUS
Status: ACTIVE | OUTPATIENT
Start: 2021-01-20 | End: 2021-01-20

## 2021-01-20 RX ADMIN — CLOPIDOGREL 75 MG: 75 TABLET, FILM COATED ORAL at 10:01

## 2021-01-20 RX ADMIN — SODIUM CHLORIDE 100 ML/HR: 0.9 INJECTION, SOLUTION INTRAVENOUS at 12:01

## 2021-01-20 RX ADMIN — MICONAZOLE NITRATE: 20 OINTMENT TOPICAL at 10:01

## 2021-01-20 RX ADMIN — MICONAZOLE NITRATE: 20 OINTMENT TOPICAL at 09:01

## 2021-01-20 RX ADMIN — MUPIROCIN: 20 OINTMENT TOPICAL at 10:01

## 2021-01-20 RX ADMIN — INSULIN ASPART 3 UNITS: 100 INJECTION, SOLUTION INTRAVENOUS; SUBCUTANEOUS at 09:01

## 2021-01-20 RX ADMIN — ASPIRIN 81 MG CHEWABLE TABLET 81 MG: 81 TABLET CHEWABLE at 10:01

## 2021-01-20 RX ADMIN — ATORVASTATIN CALCIUM 40 MG: 20 TABLET, FILM COATED ORAL at 09:01

## 2021-01-20 RX ADMIN — DOCUSATE SODIUM 50MG AND SENNOSIDES 8.6MG 1 TABLET: 8.6; 5 TABLET, FILM COATED ORAL at 10:01

## 2021-01-20 RX ADMIN — INSULIN DETEMIR 10 UNITS: 100 INJECTION, SOLUTION SUBCUTANEOUS at 09:01

## 2021-01-21 ENCOUNTER — LAB VISIT (OUTPATIENT)
Dept: LAB | Facility: OTHER | Age: 76
End: 2021-01-21
Payer: MEDICARE

## 2021-01-21 ENCOUNTER — EXTERNAL HOME HEALTH (OUTPATIENT)
Dept: HOME HEALTH SERVICES | Facility: HOSPITAL | Age: 76
End: 2021-01-21
Payer: MEDICARE

## 2021-01-21 DIAGNOSIS — Z20.822 ENCOUNTER FOR LABORATORY TESTING FOR COVID-19 VIRUS: ICD-10-CM

## 2021-01-21 LAB
ANION GAP SERPL CALC-SCNC: 7 MMOL/L (ref 8–16)
BASOPHILS # BLD AUTO: 0.03 K/UL (ref 0–0.2)
BASOPHILS NFR BLD: 0.6 % (ref 0–1.9)
BUN SERPL-MCNC: 57 MG/DL (ref 8–23)
CALCIUM SERPL-MCNC: 8.9 MG/DL (ref 8.7–10.5)
CHLORIDE SERPL-SCNC: 100 MMOL/L (ref 95–110)
CO2 SERPL-SCNC: 28 MMOL/L (ref 23–29)
CREAT SERPL-MCNC: 1.9 MG/DL (ref 0.5–1.4)
DIFFERENTIAL METHOD: ABNORMAL
EOSINOPHIL # BLD AUTO: 0.1 K/UL (ref 0–0.5)
EOSINOPHIL NFR BLD: 0.9 % (ref 0–8)
ERYTHROCYTE [DISTWIDTH] IN BLOOD BY AUTOMATED COUNT: 13.5 % (ref 11.5–14.5)
EST. GFR  (AFRICAN AMERICAN): 28.7 ML/MIN/1.73 M^2
EST. GFR  (NON AFRICAN AMERICAN): 24.9 ML/MIN/1.73 M^2
GLUCOSE SERPL-MCNC: 145 MG/DL (ref 70–110)
HCT VFR BLD AUTO: 30 % (ref 37–48.5)
HGB BLD-MCNC: 9.5 G/DL (ref 12–16)
IMM GRANULOCYTES # BLD AUTO: 0.02 K/UL (ref 0–0.04)
IMM GRANULOCYTES NFR BLD AUTO: 0.4 % (ref 0–0.5)
LYMPHOCYTES # BLD AUTO: 1.1 K/UL (ref 1–4.8)
LYMPHOCYTES NFR BLD: 20.5 % (ref 18–48)
MAGNESIUM SERPL-MCNC: 1.9 MG/DL (ref 1.6–2.6)
MCH RBC QN AUTO: 28.4 PG (ref 27–31)
MCHC RBC AUTO-ENTMCNC: 31.7 G/DL (ref 32–36)
MCV RBC AUTO: 90 FL (ref 82–98)
MONOCYTES # BLD AUTO: 0.6 K/UL (ref 0.3–1)
MONOCYTES NFR BLD: 11.5 % (ref 4–15)
NEUTROPHILS # BLD AUTO: 3.6 K/UL (ref 1.8–7.7)
NEUTROPHILS NFR BLD: 66.1 % (ref 38–73)
NRBC BLD-RTO: 0 /100 WBC
PHOSPHATE SERPL-MCNC: 3 MG/DL (ref 2.7–4.5)
PLATELET # BLD AUTO: 299 K/UL (ref 150–350)
PMV BLD AUTO: 9.9 FL (ref 9.2–12.9)
POCT GLUCOSE: 124 MG/DL (ref 70–110)
POCT GLUCOSE: 176 MG/DL (ref 70–110)
POCT GLUCOSE: 206 MG/DL (ref 70–110)
POCT GLUCOSE: 234 MG/DL (ref 70–110)
POTASSIUM SERPL-SCNC: 3.8 MMOL/L (ref 3.5–5.1)
RBC # BLD AUTO: 3.35 M/UL (ref 4–5.4)
SODIUM SERPL-SCNC: 135 MMOL/L (ref 136–145)
WBC # BLD AUTO: 5.41 K/UL (ref 3.9–12.7)

## 2021-01-21 PROCEDURE — 80048 BASIC METABOLIC PNL TOTAL CA: CPT

## 2021-01-21 PROCEDURE — 36415 COLL VENOUS BLD VENIPUNCTURE: CPT

## 2021-01-21 PROCEDURE — 85025 COMPLETE CBC W/AUTO DIFF WBC: CPT

## 2021-01-21 PROCEDURE — 97110 THERAPEUTIC EXERCISES: CPT | Mod: CO

## 2021-01-21 PROCEDURE — 97110 THERAPEUTIC EXERCISES: CPT | Mod: CQ

## 2021-01-21 PROCEDURE — 25000003 PHARM REV CODE 250: Performed by: STUDENT IN AN ORGANIZED HEALTH CARE EDUCATION/TRAINING PROGRAM

## 2021-01-21 PROCEDURE — 84100 ASSAY OF PHOSPHORUS: CPT

## 2021-01-21 PROCEDURE — U0003 INFECTIOUS AGENT DETECTION BY NUCLEIC ACID (DNA OR RNA); SEVERE ACUTE RESPIRATORY SYNDROME CORONAVIRUS 2 (SARS-COV-2) (CORONAVIRUS DISEASE [COVID-19]), AMPLIFIED PROBE TECHNIQUE, MAKING USE OF HIGH THROUGHPUT TECHNOLOGIES AS DESCRIBED BY CMS-2020-01-R: HCPCS

## 2021-01-21 PROCEDURE — 11000004 HC SNF PRIVATE

## 2021-01-21 PROCEDURE — 97530 THERAPEUTIC ACTIVITIES: CPT | Mod: CQ

## 2021-01-21 PROCEDURE — 83735 ASSAY OF MAGNESIUM: CPT

## 2021-01-21 PROCEDURE — 97535 SELF CARE MNGMENT TRAINING: CPT | Mod: CO

## 2021-01-21 RX ADMIN — MICONAZOLE NITRATE: 20 OINTMENT TOPICAL at 09:01

## 2021-01-21 RX ADMIN — MICONAZOLE NITRATE: 20 OINTMENT TOPICAL at 10:01

## 2021-01-21 RX ADMIN — ATORVASTATIN CALCIUM 40 MG: 20 TABLET, FILM COATED ORAL at 09:01

## 2021-01-21 RX ADMIN — CLOPIDOGREL 75 MG: 75 TABLET, FILM COATED ORAL at 10:01

## 2021-01-21 RX ADMIN — ASPIRIN 81 MG CHEWABLE TABLET 81 MG: 81 TABLET CHEWABLE at 10:01

## 2021-01-21 RX ADMIN — INSULIN DETEMIR 10 UNITS: 100 INJECTION, SOLUTION SUBCUTANEOUS at 09:01

## 2021-01-21 RX ADMIN — INSULIN ASPART 2 UNITS: 100 INJECTION, SOLUTION INTRAVENOUS; SUBCUTANEOUS at 09:01

## 2021-01-22 ENCOUNTER — DOCUMENT SCAN (OUTPATIENT)
Dept: HOME HEALTH SERVICES | Facility: HOSPITAL | Age: 76
End: 2021-01-22
Payer: MEDICARE

## 2021-01-22 LAB
POCT GLUCOSE: 125 MG/DL (ref 70–110)
POCT GLUCOSE: 183 MG/DL (ref 70–110)
POCT GLUCOSE: 189 MG/DL (ref 70–110)
POCT GLUCOSE: 207 MG/DL (ref 70–110)
SARS-COV-2 RNA RESP QL NAA+PROBE: NOT DETECTED

## 2021-01-22 PROCEDURE — 97530 THERAPEUTIC ACTIVITIES: CPT | Mod: CO

## 2021-01-22 PROCEDURE — 97110 THERAPEUTIC EXERCISES: CPT | Mod: CQ

## 2021-01-22 PROCEDURE — 11000004 HC SNF PRIVATE

## 2021-01-22 PROCEDURE — 25000003 PHARM REV CODE 250: Performed by: STUDENT IN AN ORGANIZED HEALTH CARE EDUCATION/TRAINING PROGRAM

## 2021-01-22 PROCEDURE — 97530 THERAPEUTIC ACTIVITIES: CPT | Mod: CQ

## 2021-01-22 PROCEDURE — 97535 SELF CARE MNGMENT TRAINING: CPT | Mod: CO

## 2021-01-22 PROCEDURE — 97110 THERAPEUTIC EXERCISES: CPT | Mod: CO

## 2021-01-22 RX ADMIN — INSULIN ASPART 1 UNITS: 100 INJECTION, SOLUTION INTRAVENOUS; SUBCUTANEOUS at 09:01

## 2021-01-22 RX ADMIN — MICONAZOLE NITRATE: 20 OINTMENT TOPICAL at 08:01

## 2021-01-22 RX ADMIN — INSULIN ASPART 2 UNITS: 100 INJECTION, SOLUTION INTRAVENOUS; SUBCUTANEOUS at 05:01

## 2021-01-22 RX ADMIN — INSULIN ASPART 4 UNITS: 100 INJECTION, SOLUTION INTRAVENOUS; SUBCUTANEOUS at 12:01

## 2021-01-22 RX ADMIN — CLOPIDOGREL 75 MG: 75 TABLET, FILM COATED ORAL at 09:01

## 2021-01-22 RX ADMIN — ATORVASTATIN CALCIUM 40 MG: 20 TABLET, FILM COATED ORAL at 08:01

## 2021-01-22 RX ADMIN — ASPIRIN 81 MG CHEWABLE TABLET 81 MG: 81 TABLET CHEWABLE at 09:01

## 2021-01-22 RX ADMIN — INSULIN DETEMIR 10 UNITS: 100 INJECTION, SOLUTION SUBCUTANEOUS at 09:01

## 2021-01-22 RX ADMIN — MICONAZOLE NITRATE: 20 OINTMENT TOPICAL at 11:01

## 2021-01-23 LAB
POCT GLUCOSE: 103 MG/DL (ref 70–110)
POCT GLUCOSE: 160 MG/DL (ref 70–110)
POCT GLUCOSE: 179 MG/DL (ref 70–110)
POCT GLUCOSE: 233 MG/DL (ref 70–110)

## 2021-01-23 PROCEDURE — 25000003 PHARM REV CODE 250: Performed by: STUDENT IN AN ORGANIZED HEALTH CARE EDUCATION/TRAINING PROGRAM

## 2021-01-23 PROCEDURE — 11000004 HC SNF PRIVATE

## 2021-01-23 RX ADMIN — MICONAZOLE NITRATE: 20 OINTMENT TOPICAL at 09:01

## 2021-01-23 RX ADMIN — INSULIN ASPART 2 UNITS: 100 INJECTION, SOLUTION INTRAVENOUS; SUBCUTANEOUS at 02:01

## 2021-01-23 RX ADMIN — ATORVASTATIN CALCIUM 40 MG: 20 TABLET, FILM COATED ORAL at 09:01

## 2021-01-23 RX ADMIN — CLOPIDOGREL 75 MG: 75 TABLET, FILM COATED ORAL at 08:01

## 2021-01-23 RX ADMIN — ASPIRIN 81 MG CHEWABLE TABLET 81 MG: 81 TABLET CHEWABLE at 08:01

## 2021-01-23 RX ADMIN — INSULIN ASPART 2 UNITS: 100 INJECTION, SOLUTION INTRAVENOUS; SUBCUTANEOUS at 09:01

## 2021-01-23 RX ADMIN — INSULIN DETEMIR 10 UNITS: 100 INJECTION, SOLUTION SUBCUTANEOUS at 09:01

## 2021-01-23 RX ADMIN — MICONAZOLE NITRATE: 20 OINTMENT TOPICAL at 11:01

## 2021-01-23 RX ADMIN — INSULIN ASPART 2 UNITS: 100 INJECTION, SOLUTION INTRAVENOUS; SUBCUTANEOUS at 05:01

## 2021-01-24 LAB
POCT GLUCOSE: 116 MG/DL (ref 70–110)
POCT GLUCOSE: 168 MG/DL (ref 70–110)
POCT GLUCOSE: 181 MG/DL (ref 70–110)
POCT GLUCOSE: 197 MG/DL (ref 70–110)

## 2021-01-24 PROCEDURE — 11000004 HC SNF PRIVATE

## 2021-01-24 PROCEDURE — 97110 THERAPEUTIC EXERCISES: CPT | Mod: CO

## 2021-01-24 PROCEDURE — 97110 THERAPEUTIC EXERCISES: CPT

## 2021-01-24 PROCEDURE — 97535 SELF CARE MNGMENT TRAINING: CPT | Mod: CO

## 2021-01-24 PROCEDURE — 97530 THERAPEUTIC ACTIVITIES: CPT

## 2021-01-24 PROCEDURE — 25000003 PHARM REV CODE 250: Performed by: STUDENT IN AN ORGANIZED HEALTH CARE EDUCATION/TRAINING PROGRAM

## 2021-01-24 RX ADMIN — INSULIN ASPART 2 UNITS: 100 INJECTION, SOLUTION INTRAVENOUS; SUBCUTANEOUS at 01:01

## 2021-01-24 RX ADMIN — MICONAZOLE NITRATE: 20 OINTMENT TOPICAL at 09:01

## 2021-01-24 RX ADMIN — INSULIN ASPART 1 UNITS: 100 INJECTION, SOLUTION INTRAVENOUS; SUBCUTANEOUS at 09:01

## 2021-01-24 RX ADMIN — INSULIN ASPART 2 UNITS: 100 INJECTION, SOLUTION INTRAVENOUS; SUBCUTANEOUS at 06:01

## 2021-01-24 RX ADMIN — INSULIN DETEMIR 10 UNITS: 100 INJECTION, SOLUTION SUBCUTANEOUS at 09:01

## 2021-01-24 RX ADMIN — MICONAZOLE NITRATE: 20 OINTMENT TOPICAL at 08:01

## 2021-01-24 RX ADMIN — ASPIRIN 81 MG CHEWABLE TABLET 81 MG: 81 TABLET CHEWABLE at 08:01

## 2021-01-24 RX ADMIN — ATORVASTATIN CALCIUM 40 MG: 20 TABLET, FILM COATED ORAL at 09:01

## 2021-01-24 RX ADMIN — CLOPIDOGREL 75 MG: 75 TABLET, FILM COATED ORAL at 08:01

## 2021-01-25 LAB
ANION GAP SERPL CALC-SCNC: 10 MMOL/L (ref 8–16)
BASOPHILS # BLD AUTO: 0.02 K/UL (ref 0–0.2)
BASOPHILS NFR BLD: 0.4 % (ref 0–1.9)
BUN SERPL-MCNC: 50 MG/DL (ref 8–23)
CALCIUM SERPL-MCNC: 9.3 MG/DL (ref 8.7–10.5)
CHLORIDE SERPL-SCNC: 104 MMOL/L (ref 95–110)
CO2 SERPL-SCNC: 24 MMOL/L (ref 23–29)
CREAT SERPL-MCNC: 1.6 MG/DL (ref 0.5–1.4)
DIFFERENTIAL METHOD: ABNORMAL
EOSINOPHIL # BLD AUTO: 0.1 K/UL (ref 0–0.5)
EOSINOPHIL NFR BLD: 1.3 % (ref 0–8)
ERYTHROCYTE [DISTWIDTH] IN BLOOD BY AUTOMATED COUNT: 13.3 % (ref 11.5–14.5)
EST. GFR  (AFRICAN AMERICAN): 35.3 ML/MIN/1.73 M^2
EST. GFR  (NON AFRICAN AMERICAN): 30.6 ML/MIN/1.73 M^2
GLUCOSE SERPL-MCNC: 101 MG/DL (ref 70–110)
HCT VFR BLD AUTO: 32 % (ref 37–48.5)
HGB BLD-MCNC: 10.1 G/DL (ref 12–16)
IMM GRANULOCYTES # BLD AUTO: 0 K/UL (ref 0–0.04)
IMM GRANULOCYTES NFR BLD AUTO: 0 % (ref 0–0.5)
LYMPHOCYTES # BLD AUTO: 1.1 K/UL (ref 1–4.8)
LYMPHOCYTES NFR BLD: 23.3 % (ref 18–48)
MAGNESIUM SERPL-MCNC: 2 MG/DL (ref 1.6–2.6)
MCH RBC QN AUTO: 28.3 PG (ref 27–31)
MCHC RBC AUTO-ENTMCNC: 31.6 G/DL (ref 32–36)
MCV RBC AUTO: 90 FL (ref 82–98)
MONOCYTES # BLD AUTO: 0.5 K/UL (ref 0.3–1)
MONOCYTES NFR BLD: 11.4 % (ref 4–15)
NEUTROPHILS # BLD AUTO: 3 K/UL (ref 1.8–7.7)
NEUTROPHILS NFR BLD: 63.6 % (ref 38–73)
NRBC BLD-RTO: 0 /100 WBC
PHOSPHATE SERPL-MCNC: 2.7 MG/DL (ref 2.7–4.5)
PLATELET # BLD AUTO: 344 K/UL (ref 150–350)
PMV BLD AUTO: 9.6 FL (ref 9.2–12.9)
POCT GLUCOSE: 123 MG/DL (ref 70–110)
POCT GLUCOSE: 149 MG/DL (ref 70–110)
POCT GLUCOSE: 190 MG/DL (ref 70–110)
POCT GLUCOSE: 92 MG/DL (ref 70–110)
POTASSIUM SERPL-SCNC: 4 MMOL/L (ref 3.5–5.1)
RBC # BLD AUTO: 3.57 M/UL (ref 4–5.4)
SODIUM SERPL-SCNC: 138 MMOL/L (ref 136–145)
WBC # BLD AUTO: 4.72 K/UL (ref 3.9–12.7)

## 2021-01-25 PROCEDURE — 11000004 HC SNF PRIVATE

## 2021-01-25 PROCEDURE — 97110 THERAPEUTIC EXERCISES: CPT | Mod: CQ

## 2021-01-25 PROCEDURE — 80048 BASIC METABOLIC PNL TOTAL CA: CPT

## 2021-01-25 PROCEDURE — 97535 SELF CARE MNGMENT TRAINING: CPT

## 2021-01-25 PROCEDURE — 83735 ASSAY OF MAGNESIUM: CPT

## 2021-01-25 PROCEDURE — 36415 COLL VENOUS BLD VENIPUNCTURE: CPT

## 2021-01-25 PROCEDURE — 25000003 PHARM REV CODE 250: Performed by: STUDENT IN AN ORGANIZED HEALTH CARE EDUCATION/TRAINING PROGRAM

## 2021-01-25 PROCEDURE — 97110 THERAPEUTIC EXERCISES: CPT

## 2021-01-25 PROCEDURE — 97530 THERAPEUTIC ACTIVITIES: CPT | Mod: CQ

## 2021-01-25 PROCEDURE — 84100 ASSAY OF PHOSPHORUS: CPT

## 2021-01-25 PROCEDURE — 85025 COMPLETE CBC W/AUTO DIFF WBC: CPT

## 2021-01-25 PROCEDURE — 97803 MED NUTRITION INDIV SUBSEQ: CPT

## 2021-01-25 RX ADMIN — INSULIN DETEMIR 10 UNITS: 100 INJECTION, SOLUTION SUBCUTANEOUS at 09:01

## 2021-01-25 RX ADMIN — MICONAZOLE NITRATE: 20 OINTMENT TOPICAL at 09:01

## 2021-01-25 RX ADMIN — CLOPIDOGREL 75 MG: 75 TABLET, FILM COATED ORAL at 09:01

## 2021-01-25 RX ADMIN — INSULIN ASPART 2 UNITS: 100 INJECTION, SOLUTION INTRAVENOUS; SUBCUTANEOUS at 04:01

## 2021-01-25 RX ADMIN — ATORVASTATIN CALCIUM 40 MG: 20 TABLET, FILM COATED ORAL at 09:01

## 2021-01-25 RX ADMIN — ASPIRIN 81 MG CHEWABLE TABLET 81 MG: 81 TABLET CHEWABLE at 09:01

## 2021-01-26 ENCOUNTER — PATIENT OUTREACH (OUTPATIENT)
Dept: ADMINISTRATIVE | Facility: OTHER | Age: 76
End: 2021-01-26

## 2021-01-26 ENCOUNTER — LAB VISIT (OUTPATIENT)
Dept: LAB | Facility: OTHER | Age: 76
End: 2021-01-26
Payer: MEDICARE

## 2021-01-26 ENCOUNTER — OFFICE VISIT (OUTPATIENT)
Dept: ORTHOPEDICS | Facility: CLINIC | Age: 76
DRG: 554 | End: 2021-01-26
Attending: HOSPITALIST
Payer: MEDICARE

## 2021-01-26 ENCOUNTER — TELEPHONE (OUTPATIENT)
Dept: ORTHOPEDICS | Facility: CLINIC | Age: 76
End: 2021-01-26

## 2021-01-26 VITALS — BODY MASS INDEX: 43.67 KG/M2 | HEIGHT: 65 IN | WEIGHT: 262.13 LBS

## 2021-01-26 DIAGNOSIS — Z20.822 ENCOUNTER FOR LABORATORY TESTING FOR COVID-19 VIRUS: ICD-10-CM

## 2021-01-26 DIAGNOSIS — M14.672 CHARCOT ANKLE, LEFT: Primary | ICD-10-CM

## 2021-01-26 LAB
POCT GLUCOSE: 161 MG/DL (ref 70–110)
POCT GLUCOSE: 179 MG/DL (ref 70–110)
POCT GLUCOSE: 205 MG/DL (ref 70–110)
POCT GLUCOSE: 93 MG/DL (ref 70–110)

## 2021-01-26 PROCEDURE — 1126F PR PAIN SEVERITY QUANTIFIED, NO PAIN PRESENT: ICD-10-PCS | Mod: S$GLB,,, | Performed by: PHYSICIAN ASSISTANT

## 2021-01-26 PROCEDURE — 1126F AMNT PAIN NOTED NONE PRSNT: CPT | Mod: S$GLB,,, | Performed by: PHYSICIAN ASSISTANT

## 2021-01-26 PROCEDURE — 3078F PR MOST RECENT DIASTOLIC BLOOD PRESSURE < 80 MM HG: ICD-10-PCS | Mod: CPTII,S$GLB,, | Performed by: PHYSICIAN ASSISTANT

## 2021-01-26 PROCEDURE — 3078F DIAST BP <80 MM HG: CPT | Mod: CPTII,S$GLB,, | Performed by: PHYSICIAN ASSISTANT

## 2021-01-26 PROCEDURE — 3074F PR MOST RECENT SYSTOLIC BLOOD PRESSURE < 130 MM HG: ICD-10-PCS | Mod: CPTII,S$GLB,, | Performed by: PHYSICIAN ASSISTANT

## 2021-01-26 PROCEDURE — 1100F PTFALLS ASSESS-DOCD GE2>/YR: CPT | Mod: CPTII,S$GLB,, | Performed by: PHYSICIAN ASSISTANT

## 2021-01-26 PROCEDURE — 99999 PR PBB SHADOW E&M-EST. PATIENT-LVL IV: CPT | Mod: PBBFAC,,, | Performed by: PHYSICIAN ASSISTANT

## 2021-01-26 PROCEDURE — 99213 PR OFFICE/OUTPT VISIT, EST, LEVL III, 20-29 MIN: ICD-10-PCS | Mod: S$GLB,,, | Performed by: PHYSICIAN ASSISTANT

## 2021-01-26 PROCEDURE — 97110 THERAPEUTIC EXERCISES: CPT

## 2021-01-26 PROCEDURE — U0003 INFECTIOUS AGENT DETECTION BY NUCLEIC ACID (DNA OR RNA); SEVERE ACUTE RESPIRATORY SYNDROME CORONAVIRUS 2 (SARS-COV-2) (CORONAVIRUS DISEASE [COVID-19]), AMPLIFIED PROBE TECHNIQUE, MAKING USE OF HIGH THROUGHPUT TECHNOLOGIES AS DESCRIBED BY CMS-2020-01-R: HCPCS

## 2021-01-26 PROCEDURE — 97535 SELF CARE MNGMENT TRAINING: CPT

## 2021-01-26 PROCEDURE — 3288F FALL RISK ASSESSMENT DOCD: CPT | Mod: CPTII,S$GLB,, | Performed by: PHYSICIAN ASSISTANT

## 2021-01-26 PROCEDURE — 3074F SYST BP LT 130 MM HG: CPT | Mod: CPTII,S$GLB,, | Performed by: PHYSICIAN ASSISTANT

## 2021-01-26 PROCEDURE — 25000003 PHARM REV CODE 250: Performed by: STUDENT IN AN ORGANIZED HEALTH CARE EDUCATION/TRAINING PROGRAM

## 2021-01-26 PROCEDURE — 1159F MED LIST DOCD IN RCRD: CPT | Mod: S$GLB,,, | Performed by: PHYSICIAN ASSISTANT

## 2021-01-26 PROCEDURE — 11000004 HC SNF PRIVATE

## 2021-01-26 PROCEDURE — 1159F PR MEDICATION LIST DOCUMENTED IN MEDICAL RECORD: ICD-10-PCS | Mod: S$GLB,,, | Performed by: PHYSICIAN ASSISTANT

## 2021-01-26 PROCEDURE — 99213 OFFICE O/P EST LOW 20 MIN: CPT | Mod: S$GLB,,, | Performed by: PHYSICIAN ASSISTANT

## 2021-01-26 PROCEDURE — 1100F PR PT FALLS ASSESS DOC 2+ FALLS/FALL W/INJURY/YR: ICD-10-PCS | Mod: CPTII,S$GLB,, | Performed by: PHYSICIAN ASSISTANT

## 2021-01-26 PROCEDURE — 99499 UNLISTED E&M SERVICE: CPT | Mod: S$GLB,,, | Performed by: PHYSICIAN ASSISTANT

## 2021-01-26 PROCEDURE — 99499 RISK ADDL DX/OHS AUDIT: ICD-10-PCS | Mod: S$GLB,,, | Performed by: PHYSICIAN ASSISTANT

## 2021-01-26 PROCEDURE — 99999 PR PBB SHADOW E&M-EST. PATIENT-LVL IV: ICD-10-PCS | Mod: PBBFAC,,, | Performed by: PHYSICIAN ASSISTANT

## 2021-01-26 PROCEDURE — 3288F PR FALLS RISK ASSESSMENT DOCUMENTED: ICD-10-PCS | Mod: CPTII,S$GLB,, | Performed by: PHYSICIAN ASSISTANT

## 2021-01-26 RX ADMIN — MICONAZOLE NITRATE: 20 OINTMENT TOPICAL at 08:01

## 2021-01-26 RX ADMIN — INSULIN ASPART 2 UNITS: 100 INJECTION, SOLUTION INTRAVENOUS; SUBCUTANEOUS at 05:01

## 2021-01-26 RX ADMIN — ATORVASTATIN CALCIUM 40 MG: 20 TABLET, FILM COATED ORAL at 08:01

## 2021-01-26 RX ADMIN — INSULIN ASPART 2 UNITS: 100 INJECTION, SOLUTION INTRAVENOUS; SUBCUTANEOUS at 08:01

## 2021-01-26 RX ADMIN — INSULIN DETEMIR 10 UNITS: 100 INJECTION, SOLUTION SUBCUTANEOUS at 08:01

## 2021-01-26 RX ADMIN — CLOPIDOGREL 75 MG: 75 TABLET, FILM COATED ORAL at 08:01

## 2021-01-26 RX ADMIN — ASPIRIN 81 MG CHEWABLE TABLET 81 MG: 81 TABLET CHEWABLE at 08:01

## 2021-01-27 LAB
POCT GLUCOSE: 136 MG/DL (ref 70–110)
POCT GLUCOSE: 161 MG/DL (ref 70–110)
POCT GLUCOSE: 196 MG/DL (ref 70–110)
POCT GLUCOSE: 95 MG/DL (ref 70–110)
SARS-COV-2 RNA RESP QL NAA+PROBE: NOT DETECTED

## 2021-01-27 PROCEDURE — 97530 THERAPEUTIC ACTIVITIES: CPT | Mod: CQ

## 2021-01-27 PROCEDURE — 97110 THERAPEUTIC EXERCISES: CPT | Mod: CO

## 2021-01-27 PROCEDURE — 25000003 PHARM REV CODE 250: Performed by: STUDENT IN AN ORGANIZED HEALTH CARE EDUCATION/TRAINING PROGRAM

## 2021-01-27 PROCEDURE — 97535 SELF CARE MNGMENT TRAINING: CPT | Mod: CO

## 2021-01-27 PROCEDURE — 11000004 HC SNF PRIVATE

## 2021-01-27 PROCEDURE — 97110 THERAPEUTIC EXERCISES: CPT | Mod: CQ

## 2021-01-27 RX ADMIN — CLOPIDOGREL 75 MG: 75 TABLET, FILM COATED ORAL at 08:01

## 2021-01-27 RX ADMIN — ATORVASTATIN CALCIUM 40 MG: 20 TABLET, FILM COATED ORAL at 08:01

## 2021-01-27 RX ADMIN — INSULIN ASPART 1 UNITS: 100 INJECTION, SOLUTION INTRAVENOUS; SUBCUTANEOUS at 08:01

## 2021-01-27 RX ADMIN — ASPIRIN 81 MG CHEWABLE TABLET 81 MG: 81 TABLET CHEWABLE at 08:01

## 2021-01-27 RX ADMIN — INSULIN DETEMIR 10 UNITS: 100 INJECTION, SOLUTION SUBCUTANEOUS at 08:01

## 2021-01-27 RX ADMIN — MICONAZOLE NITRATE: 20 OINTMENT TOPICAL at 08:01

## 2021-01-27 RX ADMIN — DOCUSATE SODIUM 50MG AND SENNOSIDES 8.6MG 1 TABLET: 8.6; 5 TABLET, FILM COATED ORAL at 08:01

## 2021-01-28 ENCOUNTER — LAB VISIT (OUTPATIENT)
Dept: LAB | Facility: OTHER | Age: 76
End: 2021-01-28
Payer: MEDICARE

## 2021-01-28 DIAGNOSIS — Z20.822 ENCOUNTER FOR LABORATORY TESTING FOR COVID-19 VIRUS: ICD-10-CM

## 2021-01-28 LAB
ANION GAP SERPL CALC-SCNC: 11 MMOL/L (ref 8–16)
BASOPHILS # BLD AUTO: 0.02 K/UL (ref 0–0.2)
BASOPHILS NFR BLD: 0.5 % (ref 0–1.9)
BUN SERPL-MCNC: 49 MG/DL (ref 8–23)
CALCIUM SERPL-MCNC: 9.1 MG/DL (ref 8.7–10.5)
CHLORIDE SERPL-SCNC: 103 MMOL/L (ref 95–110)
CO2 SERPL-SCNC: 25 MMOL/L (ref 23–29)
CREAT SERPL-MCNC: 1.7 MG/DL (ref 0.5–1.4)
DIFFERENTIAL METHOD: ABNORMAL
EOSINOPHIL # BLD AUTO: 0.1 K/UL (ref 0–0.5)
EOSINOPHIL NFR BLD: 1.6 % (ref 0–8)
ERYTHROCYTE [DISTWIDTH] IN BLOOD BY AUTOMATED COUNT: 13.2 % (ref 11.5–14.5)
EST. GFR  (AFRICAN AMERICAN): 32.8 ML/MIN/1.73 M^2
EST. GFR  (NON AFRICAN AMERICAN): 28.5 ML/MIN/1.73 M^2
GLUCOSE SERPL-MCNC: 134 MG/DL (ref 70–110)
HCT VFR BLD AUTO: 32.4 % (ref 37–48.5)
HGB BLD-MCNC: 10.2 G/DL (ref 12–16)
IMM GRANULOCYTES # BLD AUTO: 0.01 K/UL (ref 0–0.04)
IMM GRANULOCYTES NFR BLD AUTO: 0.2 % (ref 0–0.5)
LYMPHOCYTES # BLD AUTO: 1.2 K/UL (ref 1–4.8)
LYMPHOCYTES NFR BLD: 27.9 % (ref 18–48)
MAGNESIUM SERPL-MCNC: 2.1 MG/DL (ref 1.6–2.6)
MCH RBC QN AUTO: 28.4 PG (ref 27–31)
MCHC RBC AUTO-ENTMCNC: 31.5 G/DL (ref 32–36)
MCV RBC AUTO: 90 FL (ref 82–98)
MONOCYTES # BLD AUTO: 0.4 K/UL (ref 0.3–1)
MONOCYTES NFR BLD: 9.8 % (ref 4–15)
NEUTROPHILS # BLD AUTO: 2.7 K/UL (ref 1.8–7.7)
NEUTROPHILS NFR BLD: 60 % (ref 38–73)
NRBC BLD-RTO: 0 /100 WBC
PHOSPHATE SERPL-MCNC: 3.3 MG/DL (ref 2.7–4.5)
PLATELET # BLD AUTO: 385 K/UL (ref 150–350)
PMV BLD AUTO: 9.5 FL (ref 9.2–12.9)
POCT GLUCOSE: 140 MG/DL (ref 70–110)
POCT GLUCOSE: 158 MG/DL (ref 70–110)
POCT GLUCOSE: 198 MG/DL (ref 70–110)
POCT GLUCOSE: 214 MG/DL (ref 70–110)
POTASSIUM SERPL-SCNC: 4.1 MMOL/L (ref 3.5–5.1)
RBC # BLD AUTO: 3.59 M/UL (ref 4–5.4)
SODIUM SERPL-SCNC: 139 MMOL/L (ref 136–145)
WBC # BLD AUTO: 4.41 K/UL (ref 3.9–12.7)

## 2021-01-28 PROCEDURE — 85025 COMPLETE CBC W/AUTO DIFF WBC: CPT

## 2021-01-28 PROCEDURE — 97110 THERAPEUTIC EXERCISES: CPT | Mod: CO

## 2021-01-28 PROCEDURE — 36415 COLL VENOUS BLD VENIPUNCTURE: CPT

## 2021-01-28 PROCEDURE — 84100 ASSAY OF PHOSPHORUS: CPT

## 2021-01-28 PROCEDURE — 97535 SELF CARE MNGMENT TRAINING: CPT | Mod: CO

## 2021-01-28 PROCEDURE — 97110 THERAPEUTIC EXERCISES: CPT | Mod: CQ

## 2021-01-28 PROCEDURE — 80048 BASIC METABOLIC PNL TOTAL CA: CPT

## 2021-01-28 PROCEDURE — 11000004 HC SNF PRIVATE

## 2021-01-28 PROCEDURE — 25000003 PHARM REV CODE 250: Performed by: STUDENT IN AN ORGANIZED HEALTH CARE EDUCATION/TRAINING PROGRAM

## 2021-01-28 PROCEDURE — 83735 ASSAY OF MAGNESIUM: CPT

## 2021-01-28 PROCEDURE — 97530 THERAPEUTIC ACTIVITIES: CPT | Mod: CQ

## 2021-01-28 PROCEDURE — U0003 INFECTIOUS AGENT DETECTION BY NUCLEIC ACID (DNA OR RNA); SEVERE ACUTE RESPIRATORY SYNDROME CORONAVIRUS 2 (SARS-COV-2) (CORONAVIRUS DISEASE [COVID-19]), AMPLIFIED PROBE TECHNIQUE, MAKING USE OF HIGH THROUGHPUT TECHNOLOGIES AS DESCRIBED BY CMS-2020-01-R: HCPCS

## 2021-01-28 RX ORDER — MECLIZINE HCL 12.5 MG 12.5 MG/1
25 TABLET ORAL 3 TIMES DAILY PRN
Status: DISCONTINUED | OUTPATIENT
Start: 2021-01-28 | End: 2021-02-03 | Stop reason: HOSPADM

## 2021-01-28 RX ADMIN — DOCUSATE SODIUM 50MG AND SENNOSIDES 8.6MG 1 TABLET: 8.6; 5 TABLET, FILM COATED ORAL at 09:01

## 2021-01-28 RX ADMIN — INSULIN DETEMIR 10 UNITS: 100 INJECTION, SOLUTION SUBCUTANEOUS at 08:01

## 2021-01-28 RX ADMIN — ASPIRIN 81 MG CHEWABLE TABLET 81 MG: 81 TABLET CHEWABLE at 09:01

## 2021-01-28 RX ADMIN — CLOPIDOGREL 75 MG: 75 TABLET, FILM COATED ORAL at 09:01

## 2021-01-28 RX ADMIN — ATORVASTATIN CALCIUM 40 MG: 20 TABLET, FILM COATED ORAL at 08:01

## 2021-01-28 RX ADMIN — INSULIN ASPART 2 UNITS: 100 INJECTION, SOLUTION INTRAVENOUS; SUBCUTANEOUS at 08:01

## 2021-01-28 RX ADMIN — MICONAZOLE NITRATE: 20 OINTMENT TOPICAL at 09:01

## 2021-01-28 RX ADMIN — MICONAZOLE NITRATE: 20 OINTMENT TOPICAL at 08:01

## 2021-01-29 LAB
POCT GLUCOSE: 149 MG/DL (ref 70–110)
POCT GLUCOSE: 188 MG/DL (ref 70–110)
POCT GLUCOSE: 203 MG/DL (ref 70–110)
POCT GLUCOSE: 221 MG/DL (ref 70–110)
SARS-COV-2 RNA RESP QL NAA+PROBE: NOT DETECTED

## 2021-01-29 PROCEDURE — 97110 THERAPEUTIC EXERCISES: CPT | Mod: CO

## 2021-01-29 PROCEDURE — 97530 THERAPEUTIC ACTIVITIES: CPT

## 2021-01-29 PROCEDURE — 25000003 PHARM REV CODE 250: Performed by: STUDENT IN AN ORGANIZED HEALTH CARE EDUCATION/TRAINING PROGRAM

## 2021-01-29 PROCEDURE — 11000004 HC SNF PRIVATE

## 2021-01-29 PROCEDURE — 97535 SELF CARE MNGMENT TRAINING: CPT | Mod: CO

## 2021-01-29 RX ADMIN — ASPIRIN 81 MG CHEWABLE TABLET 81 MG: 81 TABLET CHEWABLE at 08:01

## 2021-01-29 RX ADMIN — MICONAZOLE NITRATE: 20 OINTMENT TOPICAL at 08:01

## 2021-01-29 RX ADMIN — ATORVASTATIN CALCIUM 40 MG: 20 TABLET, FILM COATED ORAL at 08:01

## 2021-01-29 RX ADMIN — INSULIN ASPART 4 UNITS: 100 INJECTION, SOLUTION INTRAVENOUS; SUBCUTANEOUS at 01:01

## 2021-01-29 RX ADMIN — INSULIN DETEMIR 10 UNITS: 100 INJECTION, SOLUTION SUBCUTANEOUS at 08:01

## 2021-01-29 RX ADMIN — CLOPIDOGREL 75 MG: 75 TABLET, FILM COATED ORAL at 08:01

## 2021-01-29 RX ADMIN — INSULIN ASPART 4 UNITS: 100 INJECTION, SOLUTION INTRAVENOUS; SUBCUTANEOUS at 05:01

## 2021-01-29 RX ADMIN — INSULIN ASPART 1 UNITS: 100 INJECTION, SOLUTION INTRAVENOUS; SUBCUTANEOUS at 08:01

## 2021-01-30 LAB
POCT GLUCOSE: 171 MG/DL (ref 70–110)
POCT GLUCOSE: 246 MG/DL (ref 70–110)
POCT GLUCOSE: 269 MG/DL (ref 70–110)
POCT GLUCOSE: 344 MG/DL (ref 70–110)

## 2021-01-30 PROCEDURE — 97110 THERAPEUTIC EXERCISES: CPT

## 2021-01-30 PROCEDURE — 11000004 HC SNF PRIVATE

## 2021-01-30 PROCEDURE — 25000003 PHARM REV CODE 250: Performed by: STUDENT IN AN ORGANIZED HEALTH CARE EDUCATION/TRAINING PROGRAM

## 2021-01-30 PROCEDURE — 97535 SELF CARE MNGMENT TRAINING: CPT

## 2021-01-30 RX ADMIN — ASPIRIN 81 MG CHEWABLE TABLET 81 MG: 81 TABLET CHEWABLE at 09:01

## 2021-01-30 RX ADMIN — INSULIN ASPART 2 UNITS: 100 INJECTION, SOLUTION INTRAVENOUS; SUBCUTANEOUS at 09:01

## 2021-01-30 RX ADMIN — INSULIN ASPART 3 UNITS: 100 INJECTION, SOLUTION INTRAVENOUS; SUBCUTANEOUS at 08:01

## 2021-01-30 RX ADMIN — ATORVASTATIN CALCIUM 40 MG: 20 TABLET, FILM COATED ORAL at 08:01

## 2021-01-30 RX ADMIN — MICONAZOLE NITRATE: 20 OINTMENT TOPICAL at 09:01

## 2021-01-30 RX ADMIN — CLOPIDOGREL 75 MG: 75 TABLET, FILM COATED ORAL at 09:01

## 2021-01-30 RX ADMIN — INSULIN ASPART 8 UNITS: 100 INJECTION, SOLUTION INTRAVENOUS; SUBCUTANEOUS at 05:01

## 2021-01-30 RX ADMIN — INSULIN ASPART 4 UNITS: 100 INJECTION, SOLUTION INTRAVENOUS; SUBCUTANEOUS at 12:01

## 2021-01-30 RX ADMIN — INSULIN DETEMIR 10 UNITS: 100 INJECTION, SOLUTION SUBCUTANEOUS at 08:01

## 2021-01-31 LAB
POCT GLUCOSE: 198 MG/DL (ref 70–110)
POCT GLUCOSE: 199 MG/DL (ref 70–110)
POCT GLUCOSE: 209 MG/DL (ref 70–110)
POCT GLUCOSE: 344 MG/DL (ref 70–110)

## 2021-01-31 PROCEDURE — 11000004 HC SNF PRIVATE

## 2021-01-31 PROCEDURE — 25000003 PHARM REV CODE 250: Performed by: STUDENT IN AN ORGANIZED HEALTH CARE EDUCATION/TRAINING PROGRAM

## 2021-01-31 RX ADMIN — INSULIN ASPART 4 UNITS: 100 INJECTION, SOLUTION INTRAVENOUS; SUBCUTANEOUS at 08:01

## 2021-01-31 RX ADMIN — INSULIN DETEMIR 10 UNITS: 100 INJECTION, SOLUTION SUBCUTANEOUS at 08:01

## 2021-01-31 RX ADMIN — MICONAZOLE NITRATE: 20 OINTMENT TOPICAL at 09:01

## 2021-01-31 RX ADMIN — INSULIN ASPART 2 UNITS: 100 INJECTION, SOLUTION INTRAVENOUS; SUBCUTANEOUS at 05:01

## 2021-01-31 RX ADMIN — ATORVASTATIN CALCIUM 40 MG: 20 TABLET, FILM COATED ORAL at 08:01

## 2021-01-31 RX ADMIN — ASPIRIN 81 MG CHEWABLE TABLET 81 MG: 81 TABLET CHEWABLE at 09:01

## 2021-01-31 RX ADMIN — CLOPIDOGREL 75 MG: 75 TABLET, FILM COATED ORAL at 09:01

## 2021-01-31 RX ADMIN — MICONAZOLE NITRATE: 20 OINTMENT TOPICAL at 08:01

## 2021-01-31 RX ADMIN — INSULIN ASPART 4 UNITS: 100 INJECTION, SOLUTION INTRAVENOUS; SUBCUTANEOUS at 12:01

## 2021-02-01 LAB
ANION GAP SERPL CALC-SCNC: 12 MMOL/L (ref 8–16)
BASOPHILS # BLD AUTO: 0.02 K/UL (ref 0–0.2)
BASOPHILS NFR BLD: 0.4 % (ref 0–1.9)
BUN SERPL-MCNC: 49 MG/DL (ref 8–23)
CALCIUM SERPL-MCNC: 8.8 MG/DL (ref 8.7–10.5)
CHLORIDE SERPL-SCNC: 103 MMOL/L (ref 95–110)
CO2 SERPL-SCNC: 20 MMOL/L (ref 23–29)
CREAT SERPL-MCNC: 1.7 MG/DL (ref 0.5–1.4)
DIFFERENTIAL METHOD: ABNORMAL
EOSINOPHIL # BLD AUTO: 0 K/UL (ref 0–0.5)
EOSINOPHIL NFR BLD: 0.7 % (ref 0–8)
ERYTHROCYTE [DISTWIDTH] IN BLOOD BY AUTOMATED COUNT: 13.7 % (ref 11.5–14.5)
EST. GFR  (AFRICAN AMERICAN): 32.8 ML/MIN/1.73 M^2
EST. GFR  (NON AFRICAN AMERICAN): 28.5 ML/MIN/1.73 M^2
GLUCOSE SERPL-MCNC: 214 MG/DL (ref 70–110)
HCT VFR BLD AUTO: 33.2 % (ref 37–48.5)
HGB BLD-MCNC: 10.2 G/DL (ref 12–16)
IMM GRANULOCYTES # BLD AUTO: 0.01 K/UL (ref 0–0.04)
IMM GRANULOCYTES NFR BLD AUTO: 0.2 % (ref 0–0.5)
LYMPHOCYTES # BLD AUTO: 1.2 K/UL (ref 1–4.8)
LYMPHOCYTES NFR BLD: 22.4 % (ref 18–48)
MAGNESIUM SERPL-MCNC: 2.2 MG/DL (ref 1.6–2.6)
MCH RBC QN AUTO: 28.1 PG (ref 27–31)
MCHC RBC AUTO-ENTMCNC: 30.7 G/DL (ref 32–36)
MCV RBC AUTO: 92 FL (ref 82–98)
MONOCYTES # BLD AUTO: 0.5 K/UL (ref 0.3–1)
MONOCYTES NFR BLD: 9.1 % (ref 4–15)
NEUTROPHILS # BLD AUTO: 3.6 K/UL (ref 1.8–7.7)
NEUTROPHILS NFR BLD: 67.2 % (ref 38–73)
NRBC BLD-RTO: 0 /100 WBC
PHOSPHATE SERPL-MCNC: 2.8 MG/DL (ref 2.7–4.5)
PLATELET # BLD AUTO: 335 K/UL (ref 150–350)
PMV BLD AUTO: 9.8 FL (ref 9.2–12.9)
POCT GLUCOSE: 220 MG/DL (ref 70–110)
POCT GLUCOSE: 220 MG/DL (ref 70–110)
POCT GLUCOSE: 285 MG/DL (ref 70–110)
POCT GLUCOSE: 301 MG/DL (ref 70–110)
POTASSIUM SERPL-SCNC: 4.2 MMOL/L (ref 3.5–5.1)
RBC # BLD AUTO: 3.63 M/UL (ref 4–5.4)
SODIUM SERPL-SCNC: 135 MMOL/L (ref 136–145)
WBC # BLD AUTO: 5.39 K/UL (ref 3.9–12.7)

## 2021-02-01 PROCEDURE — 97110 THERAPEUTIC EXERCISES: CPT | Mod: CQ

## 2021-02-01 PROCEDURE — 25000003 PHARM REV CODE 250: Performed by: NURSE PRACTITIONER

## 2021-02-01 PROCEDURE — 25000003 PHARM REV CODE 250: Performed by: STUDENT IN AN ORGANIZED HEALTH CARE EDUCATION/TRAINING PROGRAM

## 2021-02-01 PROCEDURE — 97535 SELF CARE MNGMENT TRAINING: CPT | Mod: CO

## 2021-02-01 PROCEDURE — 84100 ASSAY OF PHOSPHORUS: CPT

## 2021-02-01 PROCEDURE — 83735 ASSAY OF MAGNESIUM: CPT

## 2021-02-01 PROCEDURE — 36415 COLL VENOUS BLD VENIPUNCTURE: CPT

## 2021-02-01 PROCEDURE — 97803 MED NUTRITION INDIV SUBSEQ: CPT

## 2021-02-01 PROCEDURE — 80048 BASIC METABOLIC PNL TOTAL CA: CPT

## 2021-02-01 PROCEDURE — 11000004 HC SNF PRIVATE

## 2021-02-01 PROCEDURE — 97530 THERAPEUTIC ACTIVITIES: CPT | Mod: CQ

## 2021-02-01 PROCEDURE — 85025 COMPLETE CBC W/AUTO DIFF WBC: CPT

## 2021-02-01 RX ORDER — BUMETANIDE 1 MG/1
2 TABLET ORAL DAILY
Qty: 180 TABLET | Refills: 3 | Status: ON HOLD
Start: 2021-02-01 | End: 2021-05-18 | Stop reason: HOSPADM

## 2021-02-01 RX ORDER — AMOXICILLIN 250 MG
1 CAPSULE ORAL 2 TIMES DAILY
COMMUNITY
Start: 2021-02-01 | End: 2021-12-06

## 2021-02-01 RX ORDER — SODIUM BICARBONATE 650 MG/1
650 TABLET ORAL ONCE
Status: COMPLETED | OUTPATIENT
Start: 2021-02-01 | End: 2021-02-01

## 2021-02-01 RX ADMIN — SODIUM BICARBONATE 650 MG TABLET 650 MG: at 08:02

## 2021-02-01 RX ADMIN — INSULIN ASPART 8 UNITS: 100 INJECTION, SOLUTION INTRAVENOUS; SUBCUTANEOUS at 05:02

## 2021-02-01 RX ADMIN — ATORVASTATIN CALCIUM 40 MG: 20 TABLET, FILM COATED ORAL at 08:02

## 2021-02-01 RX ADMIN — MICONAZOLE NITRATE: 20 OINTMENT TOPICAL at 08:02

## 2021-02-01 RX ADMIN — ASPIRIN 81 MG CHEWABLE TABLET 81 MG: 81 TABLET CHEWABLE at 08:02

## 2021-02-01 RX ADMIN — INSULIN ASPART 2 UNITS: 100 INJECTION, SOLUTION INTRAVENOUS; SUBCUTANEOUS at 08:02

## 2021-02-01 RX ADMIN — INSULIN ASPART 4 UNITS: 100 INJECTION, SOLUTION INTRAVENOUS; SUBCUTANEOUS at 08:02

## 2021-02-01 RX ADMIN — CLOPIDOGREL 75 MG: 75 TABLET, FILM COATED ORAL at 08:02

## 2021-02-01 RX ADMIN — INSULIN ASPART 6 UNITS: 100 INJECTION, SOLUTION INTRAVENOUS; SUBCUTANEOUS at 12:02

## 2021-02-02 ENCOUNTER — LAB VISIT (OUTPATIENT)
Dept: LAB | Facility: OTHER | Age: 76
End: 2021-02-02
Payer: MEDICARE

## 2021-02-02 DIAGNOSIS — Z20.822 ENCOUNTER FOR LABORATORY TESTING FOR COVID-19 VIRUS: ICD-10-CM

## 2021-02-02 LAB
POCT GLUCOSE: 232 MG/DL (ref 70–110)
POCT GLUCOSE: 280 MG/DL (ref 70–110)
POCT GLUCOSE: 289 MG/DL (ref 70–110)
POCT GLUCOSE: 331 MG/DL (ref 70–110)

## 2021-02-02 PROCEDURE — 25000003 PHARM REV CODE 250: Performed by: STUDENT IN AN ORGANIZED HEALTH CARE EDUCATION/TRAINING PROGRAM

## 2021-02-02 PROCEDURE — 97110 THERAPEUTIC EXERCISES: CPT | Mod: CQ

## 2021-02-02 PROCEDURE — 11000004 HC SNF PRIVATE

## 2021-02-02 PROCEDURE — 25000003 PHARM REV CODE 250: Performed by: NURSE PRACTITIONER

## 2021-02-02 PROCEDURE — 97535 SELF CARE MNGMENT TRAINING: CPT | Mod: CO

## 2021-02-02 PROCEDURE — U0003 INFECTIOUS AGENT DETECTION BY NUCLEIC ACID (DNA OR RNA); SEVERE ACUTE RESPIRATORY SYNDROME CORONAVIRUS 2 (SARS-COV-2) (CORONAVIRUS DISEASE [COVID-19]), AMPLIFIED PROBE TECHNIQUE, MAKING USE OF HIGH THROUGHPUT TECHNOLOGIES AS DESCRIBED BY CMS-2020-01-R: HCPCS

## 2021-02-02 RX ADMIN — DOCUSATE SODIUM 50MG AND SENNOSIDES 8.6MG 1 TABLET: 8.6; 5 TABLET, FILM COATED ORAL at 09:02

## 2021-02-02 RX ADMIN — MICONAZOLE NITRATE: 20 OINTMENT TOPICAL at 09:02

## 2021-02-02 RX ADMIN — INSULIN ASPART 4 UNITS: 100 INJECTION, SOLUTION INTRAVENOUS; SUBCUTANEOUS at 08:02

## 2021-02-02 RX ADMIN — ATORVASTATIN CALCIUM 40 MG: 20 TABLET, FILM COATED ORAL at 09:02

## 2021-02-02 RX ADMIN — INSULIN ASPART 6 UNITS: 100 INJECTION, SOLUTION INTRAVENOUS; SUBCUTANEOUS at 12:02

## 2021-02-02 RX ADMIN — MICONAZOLE NITRATE: 20 OINTMENT TOPICAL at 08:02

## 2021-02-02 RX ADMIN — OXYCODONE 5 MG: 5 TABLET ORAL at 09:02

## 2021-02-02 RX ADMIN — CLOPIDOGREL 75 MG: 75 TABLET, FILM COATED ORAL at 08:02

## 2021-02-02 RX ADMIN — INSULIN ASPART 3 UNITS: 100 INJECTION, SOLUTION INTRAVENOUS; SUBCUTANEOUS at 09:02

## 2021-02-02 RX ADMIN — INSULIN ASPART 8 UNITS: 100 INJECTION, SOLUTION INTRAVENOUS; SUBCUTANEOUS at 05:02

## 2021-02-02 RX ADMIN — ASPIRIN 81 MG CHEWABLE TABLET 81 MG: 81 TABLET CHEWABLE at 08:02

## 2021-02-03 VITALS
WEIGHT: 257.25 LBS | TEMPERATURE: 98 F | RESPIRATION RATE: 18 BRPM | HEIGHT: 65 IN | DIASTOLIC BLOOD PRESSURE: 62 MMHG | OXYGEN SATURATION: 95 % | SYSTOLIC BLOOD PRESSURE: 134 MMHG | HEART RATE: 65 BPM | BODY MASS INDEX: 42.86 KG/M2

## 2021-02-03 LAB
POCT GLUCOSE: 171 MG/DL (ref 70–110)
POCT GLUCOSE: 268 MG/DL (ref 70–110)

## 2021-02-03 PROCEDURE — 25000003 PHARM REV CODE 250: Performed by: STUDENT IN AN ORGANIZED HEALTH CARE EDUCATION/TRAINING PROGRAM

## 2021-02-03 RX ADMIN — CLOPIDOGREL 75 MG: 75 TABLET, FILM COATED ORAL at 08:02

## 2021-02-03 RX ADMIN — INSULIN ASPART 2 UNITS: 100 INJECTION, SOLUTION INTRAVENOUS; SUBCUTANEOUS at 08:02

## 2021-02-03 RX ADMIN — ASPIRIN 81 MG CHEWABLE TABLET 81 MG: 81 TABLET CHEWABLE at 08:02

## 2021-02-03 RX ADMIN — MICONAZOLE NITRATE: 20 OINTMENT TOPICAL at 08:02

## 2021-02-04 LAB — SARS-COV-2 RNA RESP QL NAA+PROBE: NOT DETECTED

## 2021-02-07 ENCOUNTER — PATIENT MESSAGE (OUTPATIENT)
Dept: WOUND CARE | Facility: CLINIC | Age: 76
End: 2021-02-07

## 2021-02-08 ENCOUNTER — PATIENT MESSAGE (OUTPATIENT)
Dept: WOUND CARE | Facility: CLINIC | Age: 76
End: 2021-02-08

## 2021-02-17 ENCOUNTER — OFFICE VISIT (OUTPATIENT)
Dept: WOUND CARE | Facility: CLINIC | Age: 76
End: 2021-02-17
Payer: MEDICARE

## 2021-02-17 ENCOUNTER — TELEPHONE (OUTPATIENT)
Dept: INTERNAL MEDICINE | Facility: CLINIC | Age: 76
End: 2021-02-17

## 2021-02-17 ENCOUNTER — PATIENT MESSAGE (OUTPATIENT)
Dept: INTERNAL MEDICINE | Facility: CLINIC | Age: 76
End: 2021-02-17

## 2021-02-17 VITALS
SYSTOLIC BLOOD PRESSURE: 131 MMHG | HEART RATE: 83 BPM | BODY MASS INDEX: 42.81 KG/M2 | TEMPERATURE: 96 F | DIASTOLIC BLOOD PRESSURE: 72 MMHG | HEIGHT: 65 IN

## 2021-02-17 DIAGNOSIS — R39.9 LOWER URINARY TRACT SYMPTOMS: Primary | ICD-10-CM

## 2021-02-17 DIAGNOSIS — L97.901 ULCER OF LOWER EXTREMITY, LIMITED TO BREAKDOWN OF SKIN, UNSPECIFIED LATERALITY: Primary | ICD-10-CM

## 2021-02-17 DIAGNOSIS — L97.819 VARICOSE VEINS OF RIGHT LOWER EXTREMITY WITH ULCER OTHER PART OF LOWER LEG: ICD-10-CM

## 2021-02-17 DIAGNOSIS — I89.0 LYMPHEDEMA: ICD-10-CM

## 2021-02-17 DIAGNOSIS — I87.2 VENOUS STASIS DERMATITIS OF BOTH LOWER EXTREMITIES: Chronic | ICD-10-CM

## 2021-02-17 DIAGNOSIS — L97.821 VARICOSE VEINS OF LEFT LOWER EXTREMITY WITH ULCER OF OTHER PART OF LOWER LEG LIMITED TO BREAKDOWN OF SKIN: Primary | ICD-10-CM

## 2021-02-17 DIAGNOSIS — I83.028 VARICOSE VEINS OF LEFT LOWER EXTREMITY WITH ULCER OF OTHER PART OF LOWER LEG LIMITED TO BREAKDOWN OF SKIN: Primary | ICD-10-CM

## 2021-02-17 DIAGNOSIS — R60.0 BILATERAL LEG EDEMA: ICD-10-CM

## 2021-02-17 DIAGNOSIS — I83.018 VARICOSE VEINS OF RIGHT LOWER EXTREMITY WITH ULCER OTHER PART OF LOWER LEG: ICD-10-CM

## 2021-02-17 PROCEDURE — 29580 STRAPPING UNNA BOOT: CPT | Mod: 50,S$GLB,, | Performed by: NURSE PRACTITIONER

## 2021-02-17 PROCEDURE — 1101F PT FALLS ASSESS-DOCD LE1/YR: CPT | Mod: CPTII,S$GLB,, | Performed by: NURSE PRACTITIONER

## 2021-02-17 PROCEDURE — 99499 NO LOS: ICD-10-PCS | Mod: S$GLB,,, | Performed by: NURSE PRACTITIONER

## 2021-02-17 PROCEDURE — 1101F PR PT FALLS ASSESS DOC 0-1 FALLS W/OUT INJ PAST YR: ICD-10-PCS | Mod: CPTII,S$GLB,, | Performed by: NURSE PRACTITIONER

## 2021-02-17 PROCEDURE — 1126F PR PAIN SEVERITY QUANTIFIED, NO PAIN PRESENT: ICD-10-PCS | Mod: S$GLB,,, | Performed by: NURSE PRACTITIONER

## 2021-02-17 PROCEDURE — 3288F FALL RISK ASSESSMENT DOCD: CPT | Mod: CPTII,S$GLB,, | Performed by: NURSE PRACTITIONER

## 2021-02-17 PROCEDURE — 1126F AMNT PAIN NOTED NONE PRSNT: CPT | Mod: S$GLB,,, | Performed by: NURSE PRACTITIONER

## 2021-02-17 PROCEDURE — 99499 UNLISTED E&M SERVICE: CPT | Mod: S$GLB,,, | Performed by: NURSE PRACTITIONER

## 2021-02-17 PROCEDURE — 99999 PR PBB SHADOW E&M-EST. PATIENT-LVL V: CPT | Mod: PBBFAC,,, | Performed by: NURSE PRACTITIONER

## 2021-02-17 PROCEDURE — 29580 PR STRAPPING UNNA BOOT: ICD-10-PCS | Mod: 50,S$GLB,, | Performed by: NURSE PRACTITIONER

## 2021-02-17 PROCEDURE — 3288F PR FALLS RISK ASSESSMENT DOCUMENTED: ICD-10-PCS | Mod: CPTII,S$GLB,, | Performed by: NURSE PRACTITIONER

## 2021-02-17 PROCEDURE — 99999 PR PBB SHADOW E&M-EST. PATIENT-LVL V: ICD-10-PCS | Mod: PBBFAC,,, | Performed by: NURSE PRACTITIONER

## 2021-02-18 ENCOUNTER — PATIENT MESSAGE (OUTPATIENT)
Dept: WOUND CARE | Facility: CLINIC | Age: 76
End: 2021-02-18

## 2021-02-18 ENCOUNTER — TELEPHONE (OUTPATIENT)
Dept: ORTHOPEDICS | Facility: CLINIC | Age: 76
End: 2021-02-18

## 2021-02-22 ENCOUNTER — TELEPHONE (OUTPATIENT)
Dept: ORTHOPEDICS | Facility: CLINIC | Age: 76
End: 2021-02-22

## 2021-02-24 PROCEDURE — G0180 MD CERTIFICATION HHA PATIENT: HCPCS | Mod: ,,, | Performed by: INTERNAL MEDICINE

## 2021-02-24 PROCEDURE — G0180 PR HOME HEALTH MD CERTIFICATION: ICD-10-PCS | Mod: ,,, | Performed by: INTERNAL MEDICINE

## 2021-02-25 ENCOUNTER — TELEPHONE (OUTPATIENT)
Dept: INTERNAL MEDICINE | Facility: CLINIC | Age: 76
End: 2021-02-25

## 2021-02-25 ENCOUNTER — TELEPHONE (OUTPATIENT)
Dept: ORTHOPEDICS | Facility: CLINIC | Age: 76
End: 2021-02-25

## 2021-02-25 RX ORDER — TRIAMCINOLONE ACETONIDE 1 MG/G
CREAM TOPICAL 2 TIMES DAILY
Qty: 1 TUBE | Refills: 3 | Status: SHIPPED | OUTPATIENT
Start: 2021-02-25 | End: 2022-06-06

## 2021-02-25 RX ORDER — KETOCONAZOLE 20 MG/G
CREAM TOPICAL DAILY
Qty: 1 TUBE | Refills: 3 | Status: ON HOLD | OUTPATIENT
Start: 2021-02-25 | End: 2022-01-19 | Stop reason: HOSPADM

## 2021-02-26 ENCOUNTER — OFFICE VISIT (OUTPATIENT)
Dept: ORTHOPEDICS | Facility: CLINIC | Age: 76
End: 2021-02-26
Payer: MEDICARE

## 2021-02-26 ENCOUNTER — PATIENT OUTREACH (OUTPATIENT)
Dept: ADMINISTRATIVE | Facility: OTHER | Age: 76
End: 2021-02-26

## 2021-02-26 DIAGNOSIS — M14.672 CHARCOT ANKLE, LEFT: Primary | ICD-10-CM

## 2021-02-26 PROCEDURE — 1159F PR MEDICATION LIST DOCUMENTED IN MEDICAL RECORD: ICD-10-PCS | Mod: 95,,, | Performed by: ORTHOPAEDIC SURGERY

## 2021-02-26 PROCEDURE — 99214 PR OFFICE/OUTPT VISIT, EST, LEVL IV, 30-39 MIN: ICD-10-PCS | Mod: 95,,, | Performed by: ORTHOPAEDIC SURGERY

## 2021-02-26 PROCEDURE — 99214 OFFICE O/P EST MOD 30 MIN: CPT | Mod: 95,,, | Performed by: ORTHOPAEDIC SURGERY

## 2021-02-26 PROCEDURE — 1159F MED LIST DOCD IN RCRD: CPT | Mod: 95,,, | Performed by: ORTHOPAEDIC SURGERY

## 2021-03-02 ENCOUNTER — DOCUMENT SCAN (OUTPATIENT)
Dept: HOME HEALTH SERVICES | Facility: HOSPITAL | Age: 76
End: 2021-03-02
Payer: MEDICARE

## 2021-03-10 ENCOUNTER — EXTERNAL HOME HEALTH (OUTPATIENT)
Dept: HOME HEALTH SERVICES | Facility: HOSPITAL | Age: 76
End: 2021-03-10
Payer: MEDICARE

## 2021-03-11 ENCOUNTER — TELEPHONE (OUTPATIENT)
Dept: INTERNAL MEDICINE | Facility: CLINIC | Age: 76
End: 2021-03-11

## 2021-03-19 ENCOUNTER — OFFICE VISIT (OUTPATIENT)
Dept: WOUND CARE | Facility: CLINIC | Age: 76
End: 2021-03-19
Payer: MEDICARE

## 2021-03-19 VITALS
HEART RATE: 58 BPM | SYSTOLIC BLOOD PRESSURE: 137 MMHG | DIASTOLIC BLOOD PRESSURE: 77 MMHG | BODY MASS INDEX: 42.81 KG/M2 | TEMPERATURE: 97 F | HEIGHT: 65 IN

## 2021-03-19 DIAGNOSIS — L97.819 VARICOSE VEINS OF RIGHT LOWER EXTREMITY WITH ULCER OTHER PART OF LOWER LEG: Primary | ICD-10-CM

## 2021-03-19 DIAGNOSIS — I83.028 VARICOSE VEINS OF LEFT LOWER EXTREMITY WITH ULCER OF OTHER PART OF LOWER LEG LIMITED TO BREAKDOWN OF SKIN: ICD-10-CM

## 2021-03-19 DIAGNOSIS — R60.0 BILATERAL LEG EDEMA: ICD-10-CM

## 2021-03-19 DIAGNOSIS — I89.0 LYMPHEDEMA: ICD-10-CM

## 2021-03-19 DIAGNOSIS — I87.2 VENOUS STASIS DERMATITIS OF BOTH LOWER EXTREMITIES: Chronic | ICD-10-CM

## 2021-03-19 DIAGNOSIS — I87.2 VENOUS INSUFFICIENCY OF BOTH LOWER EXTREMITIES: Chronic | ICD-10-CM

## 2021-03-19 DIAGNOSIS — I83.018 VARICOSE VEINS OF RIGHT LOWER EXTREMITY WITH ULCER OTHER PART OF LOWER LEG: Primary | ICD-10-CM

## 2021-03-19 DIAGNOSIS — L97.821 VARICOSE VEINS OF LEFT LOWER EXTREMITY WITH ULCER OF OTHER PART OF LOWER LEG LIMITED TO BREAKDOWN OF SKIN: ICD-10-CM

## 2021-03-19 PROCEDURE — 29580 PR STRAPPING UNNA BOOT: ICD-10-PCS | Mod: 50,S$GLB,, | Performed by: NURSE PRACTITIONER

## 2021-03-19 PROCEDURE — 99499 UNLISTED E&M SERVICE: CPT | Mod: S$GLB,,, | Performed by: NURSE PRACTITIONER

## 2021-03-19 PROCEDURE — 99499 NO LOS: ICD-10-PCS | Mod: S$GLB,,, | Performed by: NURSE PRACTITIONER

## 2021-03-19 PROCEDURE — 99999 PR PBB SHADOW E&M-EST. PATIENT-LVL V: ICD-10-PCS | Mod: PBBFAC,,, | Performed by: NURSE PRACTITIONER

## 2021-03-19 PROCEDURE — 1126F PR PAIN SEVERITY QUANTIFIED, NO PAIN PRESENT: ICD-10-PCS | Mod: S$GLB,,, | Performed by: NURSE PRACTITIONER

## 2021-03-19 PROCEDURE — 1126F AMNT PAIN NOTED NONE PRSNT: CPT | Mod: S$GLB,,, | Performed by: NURSE PRACTITIONER

## 2021-03-19 PROCEDURE — 29580 STRAPPING UNNA BOOT: CPT | Mod: 50,S$GLB,, | Performed by: NURSE PRACTITIONER

## 2021-03-19 PROCEDURE — 3288F PR FALLS RISK ASSESSMENT DOCUMENTED: ICD-10-PCS | Mod: CPTII,S$GLB,, | Performed by: NURSE PRACTITIONER

## 2021-03-19 PROCEDURE — 3288F FALL RISK ASSESSMENT DOCD: CPT | Mod: CPTII,S$GLB,, | Performed by: NURSE PRACTITIONER

## 2021-03-19 PROCEDURE — 1101F PT FALLS ASSESS-DOCD LE1/YR: CPT | Mod: CPTII,S$GLB,, | Performed by: NURSE PRACTITIONER

## 2021-03-19 PROCEDURE — 1101F PR PT FALLS ASSESS DOC 0-1 FALLS W/OUT INJ PAST YR: ICD-10-PCS | Mod: CPTII,S$GLB,, | Performed by: NURSE PRACTITIONER

## 2021-03-19 PROCEDURE — 99999 PR PBB SHADOW E&M-EST. PATIENT-LVL V: CPT | Mod: PBBFAC,,, | Performed by: NURSE PRACTITIONER

## 2021-03-22 ENCOUNTER — PATIENT MESSAGE (OUTPATIENT)
Dept: INTERNAL MEDICINE | Facility: CLINIC | Age: 76
End: 2021-03-22

## 2021-03-22 ENCOUNTER — DOCUMENT SCAN (OUTPATIENT)
Dept: HOME HEALTH SERVICES | Facility: HOSPITAL | Age: 76
End: 2021-03-22
Payer: MEDICARE

## 2021-03-29 ENCOUNTER — PATIENT MESSAGE (OUTPATIENT)
Dept: INTERNAL MEDICINE | Facility: CLINIC | Age: 76
End: 2021-03-29

## 2021-03-29 ENCOUNTER — TELEPHONE (OUTPATIENT)
Dept: INTERNAL MEDICINE | Facility: CLINIC | Age: 76
End: 2021-03-29

## 2021-03-29 ENCOUNTER — OFFICE VISIT (OUTPATIENT)
Dept: INTERNAL MEDICINE | Facility: CLINIC | Age: 76
End: 2021-03-29
Payer: MEDICARE

## 2021-03-29 VITALS — DIASTOLIC BLOOD PRESSURE: 82 MMHG | SYSTOLIC BLOOD PRESSURE: 140 MMHG

## 2021-03-29 DIAGNOSIS — R39.9 LOWER URINARY TRACT SYMPTOMS: ICD-10-CM

## 2021-03-29 DIAGNOSIS — E21.3 HYPERPARATHYROIDISM: ICD-10-CM

## 2021-03-29 DIAGNOSIS — I10 ESSENTIAL HYPERTENSION: Chronic | ICD-10-CM

## 2021-03-29 DIAGNOSIS — Z79.4 TYPE 2 DIABETES MELLITUS WITH DIABETIC POLYNEUROPATHY, WITH LONG-TERM CURRENT USE OF INSULIN: Primary | ICD-10-CM

## 2021-03-29 DIAGNOSIS — D63.8 ANEMIA OF CHRONIC DISEASE: ICD-10-CM

## 2021-03-29 DIAGNOSIS — E11.42 TYPE 2 DIABETES MELLITUS WITH DIABETIC POLYNEUROPATHY, WITH LONG-TERM CURRENT USE OF INSULIN: Primary | ICD-10-CM

## 2021-03-29 PROCEDURE — 3077F SYST BP >= 140 MM HG: CPT | Mod: CPTII,95,, | Performed by: INTERNAL MEDICINE

## 2021-03-29 PROCEDURE — 3052F HG A1C>EQUAL 8.0%<EQUAL 9.0%: CPT | Mod: CPTII,95,, | Performed by: INTERNAL MEDICINE

## 2021-03-29 PROCEDURE — 1159F PR MEDICATION LIST DOCUMENTED IN MEDICAL RECORD: ICD-10-PCS | Mod: 95,,, | Performed by: INTERNAL MEDICINE

## 2021-03-29 PROCEDURE — 3079F DIAST BP 80-89 MM HG: CPT | Mod: CPTII,95,, | Performed by: INTERNAL MEDICINE

## 2021-03-29 PROCEDURE — 99214 PR OFFICE/OUTPT VISIT, EST, LEVL IV, 30-39 MIN: ICD-10-PCS | Mod: 95,,, | Performed by: INTERNAL MEDICINE

## 2021-03-29 PROCEDURE — 99499 UNLISTED E&M SERVICE: CPT | Mod: 95,,, | Performed by: INTERNAL MEDICINE

## 2021-03-29 PROCEDURE — 99214 OFFICE O/P EST MOD 30 MIN: CPT | Mod: 95,,, | Performed by: INTERNAL MEDICINE

## 2021-03-29 PROCEDURE — 1159F MED LIST DOCD IN RCRD: CPT | Mod: 95,,, | Performed by: INTERNAL MEDICINE

## 2021-03-29 PROCEDURE — 3077F PR MOST RECENT SYSTOLIC BLOOD PRESSURE >= 140 MM HG: ICD-10-PCS | Mod: CPTII,95,, | Performed by: INTERNAL MEDICINE

## 2021-03-29 PROCEDURE — 3052F PR MOST RECENT HEMOGLOBIN A1C LEVEL 8.0 - < 9.0%: ICD-10-PCS | Mod: CPTII,95,, | Performed by: INTERNAL MEDICINE

## 2021-03-29 PROCEDURE — 3079F PR MOST RECENT DIASTOLIC BLOOD PRESSURE 80-89 MM HG: ICD-10-PCS | Mod: CPTII,95,, | Performed by: INTERNAL MEDICINE

## 2021-03-29 PROCEDURE — 99499 RISK ADDL DX/OHS AUDIT: ICD-10-PCS | Mod: 95,,, | Performed by: INTERNAL MEDICINE

## 2021-03-30 ENCOUNTER — LAB VISIT (OUTPATIENT)
Dept: LAB | Facility: HOSPITAL | Age: 76
End: 2021-03-30
Attending: INTERNAL MEDICINE
Payer: MEDICARE

## 2021-03-30 DIAGNOSIS — E11.51 TYPE II DIABETES MELLITUS WITH PERIPHERAL CIRCULATORY DISORDER: Primary | ICD-10-CM

## 2021-03-30 LAB
ALBUMIN SERPL BCP-MCNC: 2.8 G/DL (ref 3.5–5.2)
ALP SERPL-CCNC: 197 U/L (ref 55–135)
ALT SERPL W/O P-5'-P-CCNC: 10 U/L (ref 10–44)
ANION GAP SERPL CALC-SCNC: 7 MMOL/L (ref 8–16)
AST SERPL-CCNC: 14 U/L (ref 10–40)
BASOPHILS # BLD AUTO: 0.04 K/UL (ref 0–0.2)
BASOPHILS NFR BLD: 0.8 % (ref 0–1.9)
BILIRUB SERPL-MCNC: 0.3 MG/DL (ref 0.1–1)
BUN SERPL-MCNC: 30 MG/DL (ref 8–23)
CALCIUM SERPL-MCNC: 8.8 MG/DL (ref 8.7–10.5)
CHLORIDE SERPL-SCNC: 107 MMOL/L (ref 95–110)
CHOLEST SERPL-MCNC: 130 MG/DL (ref 120–199)
CHOLEST/HDLC SERPL: 3.1 {RATIO} (ref 2–5)
CO2 SERPL-SCNC: 24 MMOL/L (ref 23–29)
CREAT SERPL-MCNC: 1.6 MG/DL (ref 0.5–1.4)
DIFFERENTIAL METHOD: ABNORMAL
EOSINOPHIL # BLD AUTO: 0.1 K/UL (ref 0–0.5)
EOSINOPHIL NFR BLD: 2.7 % (ref 0–8)
ERYTHROCYTE [DISTWIDTH] IN BLOOD BY AUTOMATED COUNT: 14.2 % (ref 11.5–14.5)
EST. GFR  (AFRICAN AMERICAN): 35.3 ML/MIN/1.73 M^2
EST. GFR  (NON AFRICAN AMERICAN): 30.6 ML/MIN/1.73 M^2
ESTIMATED AVG GLUCOSE: 209 MG/DL (ref 68–131)
GLUCOSE SERPL-MCNC: 167 MG/DL (ref 70–110)
HBA1C MFR BLD: 8.9 % (ref 4–5.6)
HCT VFR BLD AUTO: 31.8 % (ref 37–48.5)
HDLC SERPL-MCNC: 42 MG/DL (ref 40–75)
HDLC SERPL: 32.3 % (ref 20–50)
HGB BLD-MCNC: 9.9 G/DL (ref 12–16)
IMM GRANULOCYTES # BLD AUTO: 0.01 K/UL (ref 0–0.04)
IMM GRANULOCYTES NFR BLD AUTO: 0.2 % (ref 0–0.5)
LDLC SERPL CALC-MCNC: 70.2 MG/DL (ref 63–159)
LYMPHOCYTES # BLD AUTO: 1.1 K/UL (ref 1–4.8)
LYMPHOCYTES NFR BLD: 20.9 % (ref 18–48)
MCH RBC QN AUTO: 28.5 PG (ref 27–31)
MCHC RBC AUTO-ENTMCNC: 31.1 G/DL (ref 32–36)
MCV RBC AUTO: 92 FL (ref 82–98)
MONOCYTES # BLD AUTO: 0.5 K/UL (ref 0.3–1)
MONOCYTES NFR BLD: 9.7 % (ref 4–15)
NEUTROPHILS # BLD AUTO: 3.4 K/UL (ref 1.8–7.7)
NEUTROPHILS NFR BLD: 65.7 % (ref 38–73)
NONHDLC SERPL-MCNC: 88 MG/DL
NRBC BLD-RTO: 0 /100 WBC
PLATELET # BLD AUTO: 369 K/UL (ref 150–450)
PMV BLD AUTO: 9.2 FL (ref 9.2–12.9)
POTASSIUM SERPL-SCNC: 5 MMOL/L (ref 3.5–5.1)
PROT SERPL-MCNC: 7.6 G/DL (ref 6–8.4)
RBC # BLD AUTO: 3.47 M/UL (ref 4–5.4)
SODIUM SERPL-SCNC: 138 MMOL/L (ref 136–145)
TRIGL SERPL-MCNC: 89 MG/DL (ref 30–150)
WBC # BLD AUTO: 5.17 K/UL (ref 3.9–12.7)

## 2021-03-30 PROCEDURE — 80053 COMPREHEN METABOLIC PANEL: CPT | Performed by: INTERNAL MEDICINE

## 2021-03-30 PROCEDURE — 80061 LIPID PANEL: CPT | Performed by: INTERNAL MEDICINE

## 2021-03-30 PROCEDURE — 83036 HEMOGLOBIN GLYCOSYLATED A1C: CPT | Performed by: INTERNAL MEDICINE

## 2021-03-30 PROCEDURE — 85025 COMPLETE CBC W/AUTO DIFF WBC: CPT | Performed by: INTERNAL MEDICINE

## 2021-04-01 ENCOUNTER — DOCUMENT SCAN (OUTPATIENT)
Dept: HOME HEALTH SERVICES | Facility: HOSPITAL | Age: 76
End: 2021-04-01
Payer: MEDICARE

## 2021-04-03 ENCOUNTER — TELEPHONE (OUTPATIENT)
Dept: INTERNAL MEDICINE | Facility: CLINIC | Age: 76
End: 2021-04-03

## 2021-04-08 ENCOUNTER — PATIENT OUTREACH (OUTPATIENT)
Dept: ADMINISTRATIVE | Facility: OTHER | Age: 76
End: 2021-04-08

## 2021-04-12 ENCOUNTER — TELEPHONE (OUTPATIENT)
Dept: NEPHROLOGY | Facility: CLINIC | Age: 76
End: 2021-04-12

## 2021-04-12 ENCOUNTER — OFFICE VISIT (OUTPATIENT)
Dept: NEPHROLOGY | Facility: CLINIC | Age: 76
End: 2021-04-12
Payer: MEDICARE

## 2021-04-12 VITALS
DIASTOLIC BLOOD PRESSURE: 72 MMHG | SYSTOLIC BLOOD PRESSURE: 130 MMHG | HEIGHT: 65 IN | OXYGEN SATURATION: 98 % | BODY MASS INDEX: 42.86 KG/M2 | HEART RATE: 65 BPM | WEIGHT: 257.25 LBS

## 2021-04-12 DIAGNOSIS — I10 ESSENTIAL HYPERTENSION: ICD-10-CM

## 2021-04-12 DIAGNOSIS — D64.9 ANEMIA, UNSPECIFIED TYPE: ICD-10-CM

## 2021-04-12 DIAGNOSIS — N18.32 STAGE 3B CHRONIC KIDNEY DISEASE: Primary | ICD-10-CM

## 2021-04-12 DIAGNOSIS — E11.649 TYPE 2 DIABETES MELLITUS WITH HYPOGLYCEMIA WITHOUT COMA, WITH LONG-TERM CURRENT USE OF INSULIN: ICD-10-CM

## 2021-04-12 DIAGNOSIS — Z79.4 TYPE 2 DIABETES MELLITUS WITH HYPOGLYCEMIA WITHOUT COMA, WITH LONG-TERM CURRENT USE OF INSULIN: ICD-10-CM

## 2021-04-12 DIAGNOSIS — E66.01 CLASS 3 SEVERE OBESITY WITH BODY MASS INDEX (BMI) OF 40.0 TO 44.9 IN ADULT, UNSPECIFIED OBESITY TYPE, UNSPECIFIED WHETHER SERIOUS COMORBIDITY PRESENT: ICD-10-CM

## 2021-04-12 PROCEDURE — 99999 PR PBB SHADOW E&M-EST. PATIENT-LVL IV: ICD-10-PCS | Mod: PBBFAC,,, | Performed by: NURSE PRACTITIONER

## 2021-04-12 PROCEDURE — 99499 RISK ADDL DX/OHS AUDIT: ICD-10-PCS | Mod: HCNC,S$GLB,, | Performed by: NURSE PRACTITIONER

## 2021-04-12 PROCEDURE — 3052F HG A1C>EQUAL 8.0%<EQUAL 9.0%: CPT | Mod: CPTII,S$GLB,, | Performed by: NURSE PRACTITIONER

## 2021-04-12 PROCEDURE — 99999 PR PBB SHADOW E&M-EST. PATIENT-LVL IV: CPT | Mod: PBBFAC,,, | Performed by: NURSE PRACTITIONER

## 2021-04-12 PROCEDURE — 3288F PR FALLS RISK ASSESSMENT DOCUMENTED: ICD-10-PCS | Mod: CPTII,S$GLB,, | Performed by: NURSE PRACTITIONER

## 2021-04-12 PROCEDURE — 99214 OFFICE O/P EST MOD 30 MIN: CPT | Mod: S$GLB,,, | Performed by: NURSE PRACTITIONER

## 2021-04-12 PROCEDURE — 99214 PR OFFICE/OUTPT VISIT, EST, LEVL IV, 30-39 MIN: ICD-10-PCS | Mod: S$GLB,,, | Performed by: NURSE PRACTITIONER

## 2021-04-12 PROCEDURE — 3288F FALL RISK ASSESSMENT DOCD: CPT | Mod: CPTII,S$GLB,, | Performed by: NURSE PRACTITIONER

## 2021-04-12 PROCEDURE — 1101F PR PT FALLS ASSESS DOC 0-1 FALLS W/OUT INJ PAST YR: ICD-10-PCS | Mod: CPTII,S$GLB,, | Performed by: NURSE PRACTITIONER

## 2021-04-12 PROCEDURE — 1126F PR PAIN SEVERITY QUANTIFIED, NO PAIN PRESENT: ICD-10-PCS | Mod: S$GLB,,, | Performed by: NURSE PRACTITIONER

## 2021-04-12 PROCEDURE — 1126F AMNT PAIN NOTED NONE PRSNT: CPT | Mod: S$GLB,,, | Performed by: NURSE PRACTITIONER

## 2021-04-12 PROCEDURE — 1101F PT FALLS ASSESS-DOCD LE1/YR: CPT | Mod: CPTII,S$GLB,, | Performed by: NURSE PRACTITIONER

## 2021-04-12 PROCEDURE — 99499 UNLISTED E&M SERVICE: CPT | Mod: HCNC,S$GLB,, | Performed by: NURSE PRACTITIONER

## 2021-04-12 PROCEDURE — 1159F MED LIST DOCD IN RCRD: CPT | Mod: S$GLB,,, | Performed by: NURSE PRACTITIONER

## 2021-04-12 PROCEDURE — 3052F PR MOST RECENT HEMOGLOBIN A1C LEVEL 8.0 - < 9.0%: ICD-10-PCS | Mod: CPTII,S$GLB,, | Performed by: NURSE PRACTITIONER

## 2021-04-12 PROCEDURE — 1159F PR MEDICATION LIST DOCUMENTED IN MEDICAL RECORD: ICD-10-PCS | Mod: S$GLB,,, | Performed by: NURSE PRACTITIONER

## 2021-04-16 ENCOUNTER — LAB VISIT (OUTPATIENT)
Dept: LAB | Facility: HOSPITAL | Age: 76
End: 2021-04-16
Attending: INTERNAL MEDICINE
Payer: MEDICARE

## 2021-04-16 DIAGNOSIS — E11.51 TYPE II DIABETES MELLITUS WITH PERIPHERAL CIRCULATORY DISORDER: Primary | ICD-10-CM

## 2021-04-16 LAB
ALBUMIN SERPL BCP-MCNC: 3 G/DL (ref 3.5–5.2)
ANION GAP SERPL CALC-SCNC: 12 MMOL/L (ref 8–16)
BACTERIA #/AREA URNS AUTO: ABNORMAL /HPF
BASOPHILS # BLD AUTO: 0.02 K/UL (ref 0–0.2)
BASOPHILS NFR BLD: 0.4 % (ref 0–1.9)
BILIRUB UR QL STRIP: NEGATIVE
BUN SERPL-MCNC: 31 MG/DL (ref 8–23)
CALCIUM SERPL-MCNC: 9.5 MG/DL (ref 8.7–10.5)
CHLORIDE SERPL-SCNC: 107 MMOL/L (ref 95–110)
CLARITY UR REFRACT.AUTO: ABNORMAL
CO2 SERPL-SCNC: 19 MMOL/L (ref 23–29)
COLOR UR AUTO: YELLOW
CREAT SERPL-MCNC: 1.6 MG/DL (ref 0.5–1.4)
CREAT UR-MCNC: 41 MG/DL (ref 15–325)
DIFFERENTIAL METHOD: ABNORMAL
EOSINOPHIL # BLD AUTO: 0.1 K/UL (ref 0–0.5)
EOSINOPHIL NFR BLD: 1.1 % (ref 0–8)
ERYTHROCYTE [DISTWIDTH] IN BLOOD BY AUTOMATED COUNT: 14.3 % (ref 11.5–14.5)
EST. GFR  (AFRICAN AMERICAN): 35.3 ML/MIN/1.73 M^2
EST. GFR  (NON AFRICAN AMERICAN): 30.6 ML/MIN/1.73 M^2
FERRITIN SERPL-MCNC: 149 NG/ML (ref 20–300)
GLUCOSE SERPL-MCNC: 89 MG/DL (ref 70–110)
GLUCOSE UR QL STRIP: NEGATIVE
HCT VFR BLD AUTO: 34 % (ref 37–48.5)
HGB BLD-MCNC: 10.6 G/DL (ref 12–16)
HGB UR QL STRIP: ABNORMAL
HYALINE CASTS UR QL AUTO: 0 /LPF
IMM GRANULOCYTES # BLD AUTO: 0.02 K/UL (ref 0–0.04)
IMM GRANULOCYTES NFR BLD AUTO: 0.4 % (ref 0–0.5)
IRON SERPL-MCNC: 44 UG/DL (ref 30–160)
KETONES UR QL STRIP: NEGATIVE
LEUKOCYTE ESTERASE UR QL STRIP: ABNORMAL
LYMPHOCYTES # BLD AUTO: 0.9 K/UL (ref 1–4.8)
LYMPHOCYTES NFR BLD: 16.6 % (ref 18–48)
MCH RBC QN AUTO: 28.3 PG (ref 27–31)
MCHC RBC AUTO-ENTMCNC: 31.2 G/DL (ref 32–36)
MCV RBC AUTO: 91 FL (ref 82–98)
MICROSCOPIC COMMENT: ABNORMAL
MONOCYTES # BLD AUTO: 0.3 K/UL (ref 0.3–1)
MONOCYTES NFR BLD: 5.9 % (ref 4–15)
NEUTROPHILS # BLD AUTO: 4 K/UL (ref 1.8–7.7)
NEUTROPHILS NFR BLD: 75.6 % (ref 38–73)
NITRITE UR QL STRIP: NEGATIVE
NRBC BLD-RTO: 0 /100 WBC
PH UR STRIP: 5 [PH] (ref 5–8)
PHOSPHATE SERPL-MCNC: 3.6 MG/DL (ref 2.7–4.5)
PLATELET # BLD AUTO: 256 K/UL (ref 150–450)
PMV BLD AUTO: 9.6 FL (ref 9.2–12.9)
POTASSIUM SERPL-SCNC: 5.4 MMOL/L (ref 3.5–5.1)
PROT UR QL STRIP: ABNORMAL
PROT UR-MCNC: 122 MG/DL (ref 0–15)
PTH-INTACT SERPL-MCNC: 262 PG/ML (ref 9–77)
RBC # BLD AUTO: 3.75 M/UL (ref 4–5.4)
RBC #/AREA URNS AUTO: 13 /HPF (ref 0–4)
SATURATED IRON: 14 % (ref 20–50)
SODIUM SERPL-SCNC: 138 MMOL/L (ref 136–145)
SP GR UR STRIP: 1.01 (ref 1–1.03)
SQUAMOUS #/AREA URNS AUTO: 3 /HPF
TOTAL IRON BINDING CAPACITY: 318 UG/DL (ref 250–450)
TRANSFERRIN SERPL-MCNC: 215 MG/DL (ref 200–375)
URN SPEC COLLECT METH UR: ABNORMAL
WBC # BLD AUTO: 5.25 K/UL (ref 3.9–12.7)
WBC #/AREA URNS AUTO: >100 /HPF (ref 0–5)
WBC CLUMPS UR QL AUTO: ABNORMAL
YEAST UR QL AUTO: ABNORMAL

## 2021-04-16 PROCEDURE — 83540 ASSAY OF IRON: CPT | Performed by: INTERNAL MEDICINE

## 2021-04-16 PROCEDURE — 83970 ASSAY OF PARATHORMONE: CPT | Performed by: INTERNAL MEDICINE

## 2021-04-16 PROCEDURE — 82728 ASSAY OF FERRITIN: CPT | Performed by: INTERNAL MEDICINE

## 2021-04-16 PROCEDURE — 80069 RENAL FUNCTION PANEL: CPT | Performed by: INTERNAL MEDICINE

## 2021-04-16 PROCEDURE — 81001 URINALYSIS AUTO W/SCOPE: CPT | Performed by: INTERNAL MEDICINE

## 2021-04-16 PROCEDURE — 82570 ASSAY OF URINE CREATININE: CPT | Performed by: INTERNAL MEDICINE

## 2021-04-16 PROCEDURE — 85025 COMPLETE CBC W/AUTO DIFF WBC: CPT | Performed by: INTERNAL MEDICINE

## 2021-04-16 PROCEDURE — 84156 ASSAY OF PROTEIN URINE: CPT | Performed by: INTERNAL MEDICINE

## 2021-04-19 ENCOUNTER — TELEPHONE (OUTPATIENT)
Dept: INTERNAL MEDICINE | Facility: CLINIC | Age: 76
End: 2021-04-19

## 2021-04-19 RX ORDER — NITROFURANTOIN (MACROCRYSTALS) 100 MG/1
100 CAPSULE ORAL EVERY 12 HOURS
Qty: 14 CAPSULE | Refills: 0 | Status: ON HOLD | OUTPATIENT
Start: 2021-04-19 | End: 2021-05-18 | Stop reason: HOSPADM

## 2021-04-20 ENCOUNTER — DOCUMENT SCAN (OUTPATIENT)
Dept: HOME HEALTH SERVICES | Facility: HOSPITAL | Age: 76
End: 2021-04-20
Payer: MEDICARE

## 2021-04-21 ENCOUNTER — OFFICE VISIT (OUTPATIENT)
Dept: WOUND CARE | Facility: CLINIC | Age: 76
End: 2021-04-21
Payer: MEDICARE

## 2021-04-21 VITALS
HEIGHT: 65 IN | DIASTOLIC BLOOD PRESSURE: 79 MMHG | HEART RATE: 60 BPM | TEMPERATURE: 98 F | WEIGHT: 250 LBS | BODY MASS INDEX: 41.65 KG/M2 | SYSTOLIC BLOOD PRESSURE: 173 MMHG

## 2021-04-21 DIAGNOSIS — I83.018 VARICOSE VEINS OF RIGHT LOWER EXTREMITY WITH ULCER OTHER PART OF LOWER LEG: Primary | ICD-10-CM

## 2021-04-21 DIAGNOSIS — I83.028 VARICOSE VEINS OF LEFT LOWER EXTREMITY WITH ULCER OF OTHER PART OF LOWER LEG LIMITED TO BREAKDOWN OF SKIN: ICD-10-CM

## 2021-04-21 DIAGNOSIS — Z79.4 TYPE 2 DIABETES MELLITUS WITH DIABETIC POLYNEUROPATHY, WITH LONG-TERM CURRENT USE OF INSULIN: ICD-10-CM

## 2021-04-21 DIAGNOSIS — L97.821 VARICOSE VEINS OF LEFT LOWER EXTREMITY WITH ULCER OF OTHER PART OF LOWER LEG LIMITED TO BREAKDOWN OF SKIN: ICD-10-CM

## 2021-04-21 DIAGNOSIS — E11.42 TYPE 2 DIABETES MELLITUS WITH DIABETIC POLYNEUROPATHY, WITH LONG-TERM CURRENT USE OF INSULIN: ICD-10-CM

## 2021-04-21 DIAGNOSIS — L97.819 VARICOSE VEINS OF RIGHT LOWER EXTREMITY WITH ULCER OTHER PART OF LOWER LEG: Primary | ICD-10-CM

## 2021-04-21 DIAGNOSIS — I87.2 VENOUS INSUFFICIENCY OF BOTH LOWER EXTREMITIES: Chronic | ICD-10-CM

## 2021-04-21 DIAGNOSIS — I89.0 LYMPHEDEMA: ICD-10-CM

## 2021-04-21 PROCEDURE — 29580 PR STRAPPING UNNA BOOT: ICD-10-PCS | Mod: RT,S$GLB,, | Performed by: NURSE PRACTITIONER

## 2021-04-21 PROCEDURE — 29580 STRAPPING UNNA BOOT: CPT | Mod: RT,S$GLB,, | Performed by: NURSE PRACTITIONER

## 2021-04-21 PROCEDURE — 1126F PR PAIN SEVERITY QUANTIFIED, NO PAIN PRESENT: ICD-10-PCS | Mod: S$GLB,,, | Performed by: NURSE PRACTITIONER

## 2021-04-21 PROCEDURE — 99499 NO LOS: ICD-10-PCS | Mod: S$GLB,,, | Performed by: NURSE PRACTITIONER

## 2021-04-21 PROCEDURE — 99999 PR PBB SHADOW E&M-EST. PATIENT-LVL V: CPT | Mod: PBBFAC,,, | Performed by: NURSE PRACTITIONER

## 2021-04-21 PROCEDURE — 1126F AMNT PAIN NOTED NONE PRSNT: CPT | Mod: S$GLB,,, | Performed by: NURSE PRACTITIONER

## 2021-04-21 PROCEDURE — 99499 UNLISTED E&M SERVICE: CPT | Mod: S$GLB,,, | Performed by: NURSE PRACTITIONER

## 2021-04-21 PROCEDURE — 99999 PR PBB SHADOW E&M-EST. PATIENT-LVL V: ICD-10-PCS | Mod: PBBFAC,,, | Performed by: NURSE PRACTITIONER

## 2021-04-25 PROCEDURE — G0179 MD RECERTIFICATION HHA PT: HCPCS | Mod: ,,, | Performed by: INTERNAL MEDICINE

## 2021-04-25 PROCEDURE — G0179 PR HOME HEALTH MD RECERTIFICATION: ICD-10-PCS | Mod: ,,, | Performed by: INTERNAL MEDICINE

## 2021-04-27 ENCOUNTER — TELEPHONE (OUTPATIENT)
Dept: INTERNAL MEDICINE | Facility: CLINIC | Age: 76
End: 2021-04-27

## 2021-04-27 DIAGNOSIS — R79.89 ELEVATED PARATHYROID HORMONE: ICD-10-CM

## 2021-04-27 DIAGNOSIS — R79.89 ELEVATED PTHRP LEVEL: Primary | ICD-10-CM

## 2021-04-30 ENCOUNTER — DOCUMENT SCAN (OUTPATIENT)
Dept: HOME HEALTH SERVICES | Facility: HOSPITAL | Age: 76
End: 2021-04-30
Payer: MEDICARE

## 2021-05-04 ENCOUNTER — EXTERNAL HOME HEALTH (OUTPATIENT)
Dept: HOME HEALTH SERVICES | Facility: HOSPITAL | Age: 76
End: 2021-05-04
Payer: MEDICARE

## 2021-05-13 ENCOUNTER — HOSPITAL ENCOUNTER (EMERGENCY)
Facility: HOSPITAL | Age: 76
Discharge: HOME | End: 2021-05-13
Attending: EMERGENCY MEDICINE
Payer: MEDICARE

## 2021-05-13 ENCOUNTER — PATIENT OUTREACH (OUTPATIENT)
Dept: EMERGENCY MEDICINE | Facility: HOSPITAL | Age: 76
End: 2021-05-13

## 2021-05-13 VITALS
BODY MASS INDEX: 41.65 KG/M2 | DIASTOLIC BLOOD PRESSURE: 60 MMHG | HEIGHT: 65 IN | HEART RATE: 77 BPM | WEIGHT: 250 LBS | TEMPERATURE: 98 F | OXYGEN SATURATION: 96 % | SYSTOLIC BLOOD PRESSURE: 128 MMHG | RESPIRATION RATE: 16 BRPM

## 2021-05-13 DIAGNOSIS — I10 HYPERTENSION: ICD-10-CM

## 2021-05-13 DIAGNOSIS — N12 PYELONEPHRITIS: Primary | ICD-10-CM

## 2021-05-13 DIAGNOSIS — R10.9 FLANK PAIN: ICD-10-CM

## 2021-05-13 LAB
ALBUMIN SERPL BCP-MCNC: 3 G/DL (ref 3.5–5.2)
ALP SERPL-CCNC: 216 U/L (ref 55–135)
ALT SERPL W/O P-5'-P-CCNC: 14 U/L (ref 10–44)
ANION GAP SERPL CALC-SCNC: 8 MMOL/L (ref 8–16)
AST SERPL-CCNC: 17 U/L (ref 10–40)
BACTERIA #/AREA URNS AUTO: ABNORMAL /HPF
BASOPHILS # BLD AUTO: 0.02 K/UL (ref 0–0.2)
BASOPHILS NFR BLD: 0.4 % (ref 0–1.9)
BILIRUB SERPL-MCNC: 0.4 MG/DL (ref 0.1–1)
BILIRUB UR QL STRIP: NEGATIVE
BUN SERPL-MCNC: 35 MG/DL (ref 8–23)
CALCIUM SERPL-MCNC: 9.8 MG/DL (ref 8.7–10.5)
CHLORIDE SERPL-SCNC: 107 MMOL/L (ref 95–110)
CLARITY UR REFRACT.AUTO: ABNORMAL
CO2 SERPL-SCNC: 22 MMOL/L (ref 23–29)
COLOR UR AUTO: YELLOW
CREAT SERPL-MCNC: 1.8 MG/DL (ref 0.5–1.4)
DIFFERENTIAL METHOD: ABNORMAL
EOSINOPHIL # BLD AUTO: 0.1 K/UL (ref 0–0.5)
EOSINOPHIL NFR BLD: 1.3 % (ref 0–8)
ERYTHROCYTE [DISTWIDTH] IN BLOOD BY AUTOMATED COUNT: 13.7 % (ref 11.5–14.5)
EST. GFR  (AFRICAN AMERICAN): 30.6 ML/MIN/1.73 M^2
EST. GFR  (NON AFRICAN AMERICAN): 26.6 ML/MIN/1.73 M^2
GLUCOSE SERPL-MCNC: 162 MG/DL (ref 70–110)
GLUCOSE UR QL STRIP: ABNORMAL
HCT VFR BLD AUTO: 35.4 % (ref 37–48.5)
HCV AB SERPL QL IA: NEGATIVE
HGB BLD-MCNC: 11.4 G/DL (ref 12–16)
HGB UR QL STRIP: ABNORMAL
HYALINE CASTS UR QL AUTO: 0 /LPF
IMM GRANULOCYTES # BLD AUTO: 0.02 K/UL (ref 0–0.04)
IMM GRANULOCYTES NFR BLD AUTO: 0.4 % (ref 0–0.5)
KETONES UR QL STRIP: NEGATIVE
LACTATE SERPL-SCNC: 1 MMOL/L (ref 0.5–2.2)
LEUKOCYTE ESTERASE UR QL STRIP: ABNORMAL
LYMPHOCYTES # BLD AUTO: 0.8 K/UL (ref 1–4.8)
LYMPHOCYTES NFR BLD: 15.7 % (ref 18–48)
MAGNESIUM SERPL-MCNC: 1.8 MG/DL (ref 1.6–2.6)
MCH RBC QN AUTO: 27.9 PG (ref 27–31)
MCHC RBC AUTO-ENTMCNC: 32.2 G/DL (ref 32–36)
MCV RBC AUTO: 87 FL (ref 82–98)
MICROSCOPIC COMMENT: ABNORMAL
MONOCYTES # BLD AUTO: 0.3 K/UL (ref 0.3–1)
MONOCYTES NFR BLD: 6.3 % (ref 4–15)
NEUTROPHILS # BLD AUTO: 4 K/UL (ref 1.8–7.7)
NEUTROPHILS NFR BLD: 75.9 % (ref 38–73)
NITRITE UR QL STRIP: NEGATIVE
NRBC BLD-RTO: 0 /100 WBC
PH UR STRIP: 6 [PH] (ref 5–8)
PLATELET # BLD AUTO: 252 K/UL (ref 150–450)
PMV BLD AUTO: 9 FL (ref 9.2–12.9)
POTASSIUM SERPL-SCNC: 4.5 MMOL/L (ref 3.5–5.1)
PROCALCITONIN SERPL IA-MCNC: 0.06 NG/ML
PROT SERPL-MCNC: 8 G/DL (ref 6–8.4)
PROT UR QL STRIP: ABNORMAL
RBC # BLD AUTO: 4.09 M/UL (ref 4–5.4)
RBC #/AREA URNS AUTO: 2 /HPF (ref 0–4)
SODIUM SERPL-SCNC: 137 MMOL/L (ref 136–145)
SP GR UR STRIP: 1.01 (ref 1–1.03)
SQUAMOUS #/AREA URNS AUTO: 1 /HPF
URN SPEC COLLECT METH UR: ABNORMAL
WBC # BLD AUTO: 5.23 K/UL (ref 3.9–12.7)
WBC #/AREA URNS AUTO: 11 /HPF (ref 0–5)
YEAST UR QL AUTO: ABNORMAL

## 2021-05-13 PROCEDURE — 96375 TX/PRO/DX INJ NEW DRUG ADDON: CPT

## 2021-05-13 PROCEDURE — 99284 EMERGENCY DEPT VISIT MOD MDM: CPT | Mod: 25

## 2021-05-13 PROCEDURE — 63600175 PHARM REV CODE 636 W HCPCS: Performed by: PHYSICIAN ASSISTANT

## 2021-05-13 PROCEDURE — 93010 ELECTROCARDIOGRAM REPORT: CPT | Mod: ,,, | Performed by: INTERNAL MEDICINE

## 2021-05-13 PROCEDURE — 81001 URINALYSIS AUTO W/SCOPE: CPT | Performed by: PHYSICIAN ASSISTANT

## 2021-05-13 PROCEDURE — 87088 URINE BACTERIA CULTURE: CPT | Performed by: PHYSICIAN ASSISTANT

## 2021-05-13 PROCEDURE — 80053 COMPREHEN METABOLIC PANEL: CPT | Performed by: PHYSICIAN ASSISTANT

## 2021-05-13 PROCEDURE — 87086 URINE CULTURE/COLONY COUNT: CPT | Performed by: PHYSICIAN ASSISTANT

## 2021-05-13 PROCEDURE — 83735 ASSAY OF MAGNESIUM: CPT | Performed by: PHYSICIAN ASSISTANT

## 2021-05-13 PROCEDURE — 87186 SC STD MICRODIL/AGAR DIL: CPT | Performed by: PHYSICIAN ASSISTANT

## 2021-05-13 PROCEDURE — 99285 EMERGENCY DEPT VISIT HI MDM: CPT | Mod: ,,, | Performed by: EMERGENCY MEDICINE

## 2021-05-13 PROCEDURE — 96376 TX/PRO/DX INJ SAME DRUG ADON: CPT

## 2021-05-13 PROCEDURE — 87040 BLOOD CULTURE FOR BACTERIA: CPT | Mod: 59 | Performed by: PHYSICIAN ASSISTANT

## 2021-05-13 PROCEDURE — 93005 ELECTROCARDIOGRAM TRACING: CPT

## 2021-05-13 PROCEDURE — 85025 COMPLETE CBC W/AUTO DIFF WBC: CPT | Performed by: PHYSICIAN ASSISTANT

## 2021-05-13 PROCEDURE — 84145 PROCALCITONIN (PCT): CPT | Performed by: PHYSICIAN ASSISTANT

## 2021-05-13 PROCEDURE — 83605 ASSAY OF LACTIC ACID: CPT | Performed by: PHYSICIAN ASSISTANT

## 2021-05-13 PROCEDURE — 99285 PR EMERGENCY DEPT VISIT,LEVEL V: ICD-10-PCS | Mod: ,,, | Performed by: EMERGENCY MEDICINE

## 2021-05-13 PROCEDURE — 93010 EKG 12-LEAD: ICD-10-PCS | Mod: ,,, | Performed by: INTERNAL MEDICINE

## 2021-05-13 PROCEDURE — 87077 CULTURE AEROBIC IDENTIFY: CPT | Performed by: PHYSICIAN ASSISTANT

## 2021-05-13 PROCEDURE — 25000003 PHARM REV CODE 250: Performed by: EMERGENCY MEDICINE

## 2021-05-13 PROCEDURE — 86803 HEPATITIS C AB TEST: CPT | Performed by: EMERGENCY MEDICINE

## 2021-05-13 PROCEDURE — 96365 THER/PROPH/DIAG IV INF INIT: CPT

## 2021-05-13 RX ORDER — HYDROCODONE BITARTRATE AND ACETAMINOPHEN 7.5; 325 MG/1; MG/1
1 TABLET ORAL EVERY 6 HOURS PRN
Qty: 12 TABLET | Refills: 0 | Status: ON HOLD | OUTPATIENT
Start: 2021-05-13 | End: 2021-05-18 | Stop reason: HOSPADM

## 2021-05-13 RX ORDER — MORPHINE SULFATE 4 MG/ML
4 INJECTION, SOLUTION INTRAMUSCULAR; INTRAVENOUS
Status: COMPLETED | OUTPATIENT
Start: 2021-05-13 | End: 2021-05-13

## 2021-05-13 RX ORDER — DOXYCYCLINE 100 MG/1
100 CAPSULE ORAL 2 TIMES DAILY
Qty: 20 CAPSULE | Refills: 0 | Status: ON HOLD | OUTPATIENT
Start: 2021-05-13 | End: 2021-05-18 | Stop reason: HOSPADM

## 2021-05-13 RX ORDER — ONDANSETRON 2 MG/ML
4 INJECTION INTRAMUSCULAR; INTRAVENOUS
Status: COMPLETED | OUTPATIENT
Start: 2021-05-13 | End: 2021-05-13

## 2021-05-13 RX ORDER — ONDANSETRON 4 MG/1
4 TABLET, ORALLY DISINTEGRATING ORAL EVERY 6 HOURS PRN
Qty: 12 TABLET | Refills: 0 | Status: SHIPPED | OUTPATIENT
Start: 2021-05-13 | End: 2021-12-06

## 2021-05-13 RX ADMIN — DOXYCYCLINE 100 MG: 100 INJECTION, POWDER, LYOPHILIZED, FOR SOLUTION INTRAVENOUS at 02:05

## 2021-05-13 RX ADMIN — ONDANSETRON 4 MG: 2 INJECTION INTRAMUSCULAR; INTRAVENOUS at 10:05

## 2021-05-13 RX ADMIN — MORPHINE SULFATE 4 MG: 4 INJECTION INTRAVENOUS at 10:05

## 2021-05-13 RX ADMIN — ONDANSETRON 4 MG: 2 INJECTION INTRAMUSCULAR; INTRAVENOUS at 11:05

## 2021-05-13 RX ADMIN — MORPHINE SULFATE 4 MG: 4 INJECTION INTRAVENOUS at 11:05

## 2021-05-15 ENCOUNTER — TELEPHONE (OUTPATIENT)
Dept: EMERGENCY MEDICINE | Facility: HOSPITAL | Age: 76
End: 2021-05-15

## 2021-05-16 ENCOUNTER — TELEPHONE (OUTPATIENT)
Dept: EMERGENCY MEDICINE | Facility: HOSPITAL | Age: 76
End: 2021-05-16

## 2021-05-17 ENCOUNTER — DOCUMENT SCAN (OUTPATIENT)
Dept: HOME HEALTH SERVICES | Facility: HOSPITAL | Age: 76
End: 2021-05-17
Payer: MEDICARE

## 2021-05-17 ENCOUNTER — HOSPITAL ENCOUNTER (INPATIENT)
Facility: HOSPITAL | Age: 76
LOS: 1 days | Discharge: HOME HEALTH | DRG: 690 | End: 2021-05-18
Attending: EMERGENCY MEDICINE | Admitting: HOSPITALIST
Payer: MEDICARE

## 2021-05-17 DIAGNOSIS — D64.9 ANEMIA, UNSPECIFIED TYPE: ICD-10-CM

## 2021-05-17 DIAGNOSIS — N17.9 AKI (ACUTE KIDNEY INJURY): ICD-10-CM

## 2021-05-17 DIAGNOSIS — R78.81 BACTEREMIA: ICD-10-CM

## 2021-05-17 DIAGNOSIS — I10 HYPERTENSION, UNSPECIFIED TYPE: ICD-10-CM

## 2021-05-17 DIAGNOSIS — B96.29 UTI DUE TO EXTENDED-SPECTRUM BETA LACTAMASE (ESBL) PRODUCING ESCHERICHIA COLI: Primary | ICD-10-CM

## 2021-05-17 DIAGNOSIS — Z16.12 UTI DUE TO EXTENDED-SPECTRUM BETA LACTAMASE (ESBL) PRODUCING ESCHERICHIA COLI: Primary | ICD-10-CM

## 2021-05-17 DIAGNOSIS — N39.0 UTI DUE TO EXTENDED-SPECTRUM BETA LACTAMASE (ESBL) PRODUCING ESCHERICHIA COLI: Primary | ICD-10-CM

## 2021-05-17 PROBLEM — I16.1 HYPERTENSIVE EMERGENCY: Status: ACTIVE | Noted: 2021-05-17

## 2021-05-17 LAB
ALBUMIN SERPL BCP-MCNC: 2.6 G/DL (ref 3.5–5.2)
ALP SERPL-CCNC: 222 U/L (ref 55–135)
ALT SERPL W/O P-5'-P-CCNC: 12 U/L (ref 10–44)
ANION GAP SERPL CALC-SCNC: 10 MMOL/L (ref 8–16)
AST SERPL-CCNC: 12 U/L (ref 10–40)
BACTERIA #/AREA URNS AUTO: ABNORMAL /HPF
BACTERIA BLD CULT: ABNORMAL
BASOPHILS # BLD AUTO: 0.02 K/UL (ref 0–0.2)
BASOPHILS NFR BLD: 0.5 % (ref 0–1.9)
BILIRUB SERPL-MCNC: 0.4 MG/DL (ref 0.1–1)
BILIRUB UR QL STRIP: NEGATIVE
BUN SERPL-MCNC: 42 MG/DL (ref 8–23)
CALCIUM SERPL-MCNC: 9.3 MG/DL (ref 8.7–10.5)
CHLORIDE SERPL-SCNC: 108 MMOL/L (ref 95–110)
CLARITY UR REFRACT.AUTO: ABNORMAL
CO2 SERPL-SCNC: 19 MMOL/L (ref 23–29)
COLOR UR AUTO: YELLOW
CREAT SERPL-MCNC: 2 MG/DL (ref 0.5–1.4)
CTP QC/QA: YES
DIFFERENTIAL METHOD: ABNORMAL
EOSINOPHIL # BLD AUTO: 0 K/UL (ref 0–0.5)
EOSINOPHIL NFR BLD: 0.7 % (ref 0–8)
ERYTHROCYTE [DISTWIDTH] IN BLOOD BY AUTOMATED COUNT: 14 % (ref 11.5–14.5)
EST. GFR  (AFRICAN AMERICAN): 27 ML/MIN/1.73 M^2
EST. GFR  (NON AFRICAN AMERICAN): 23.4 ML/MIN/1.73 M^2
GLUCOSE SERPL-MCNC: 181 MG/DL (ref 70–110)
GLUCOSE UR QL STRIP: ABNORMAL
HCT VFR BLD AUTO: 29.8 % (ref 37–48.5)
HGB BLD-MCNC: 9.8 G/DL (ref 12–16)
HGB UR QL STRIP: ABNORMAL
HYALINE CASTS UR QL AUTO: 0 /LPF
IMM GRANULOCYTES # BLD AUTO: 0.01 K/UL (ref 0–0.04)
IMM GRANULOCYTES NFR BLD AUTO: 0.2 % (ref 0–0.5)
KETONES UR QL STRIP: NEGATIVE
LEUKOCYTE ESTERASE UR QL STRIP: ABNORMAL
LYMPHOCYTES # BLD AUTO: 0.9 K/UL (ref 1–4.8)
LYMPHOCYTES NFR BLD: 21.6 % (ref 18–48)
MCH RBC QN AUTO: 28.7 PG (ref 27–31)
MCHC RBC AUTO-ENTMCNC: 32.9 G/DL (ref 32–36)
MCV RBC AUTO: 87 FL (ref 82–98)
MICROSCOPIC COMMENT: ABNORMAL
MONOCYTES # BLD AUTO: 0.5 K/UL (ref 0.3–1)
MONOCYTES NFR BLD: 12 % (ref 4–15)
NEUTROPHILS # BLD AUTO: 2.7 K/UL (ref 1.8–7.7)
NEUTROPHILS NFR BLD: 65 % (ref 38–73)
NITRITE UR QL STRIP: NEGATIVE
NRBC BLD-RTO: 0 /100 WBC
PH UR STRIP: 5 [PH] (ref 5–8)
PLATELET # BLD AUTO: 254 K/UL (ref 150–450)
PMV BLD AUTO: 9.3 FL (ref 9.2–12.9)
POCT GLUCOSE: 151 MG/DL (ref 70–110)
POCT GLUCOSE: 166 MG/DL (ref 70–110)
POTASSIUM SERPL-SCNC: 4.5 MMOL/L (ref 3.5–5.1)
PROT SERPL-MCNC: 7.4 G/DL (ref 6–8.4)
PROT UR QL STRIP: ABNORMAL
RBC # BLD AUTO: 3.42 M/UL (ref 4–5.4)
RBC #/AREA URNS AUTO: 7 /HPF (ref 0–4)
SARS-COV-2 RDRP RESP QL NAA+PROBE: NEGATIVE
SODIUM SERPL-SCNC: 137 MMOL/L (ref 136–145)
SP GR UR STRIP: 1.01 (ref 1–1.03)
SQUAMOUS #/AREA URNS AUTO: 1 /HPF
URN SPEC COLLECT METH UR: ABNORMAL
WBC # BLD AUTO: 4.08 K/UL (ref 3.9–12.7)
WBC #/AREA URNS AUTO: 86 /HPF (ref 0–5)
YEAST UR QL AUTO: ABNORMAL

## 2021-05-17 PROCEDURE — 80053 COMPREHEN METABOLIC PANEL: CPT | Performed by: EMERGENCY MEDICINE

## 2021-05-17 PROCEDURE — U0002 COVID-19 LAB TEST NON-CDC: HCPCS | Performed by: STUDENT IN AN ORGANIZED HEALTH CARE EDUCATION/TRAINING PROGRAM

## 2021-05-17 PROCEDURE — 99223 1ST HOSP IP/OBS HIGH 75: CPT | Mod: AI,GC,, | Performed by: HOSPITALIST

## 2021-05-17 PROCEDURE — 93010 ELECTROCARDIOGRAM REPORT: CPT | Mod: ,,, | Performed by: INTERNAL MEDICINE

## 2021-05-17 PROCEDURE — 99285 EMERGENCY DEPT VISIT HI MDM: CPT | Mod: 25

## 2021-05-17 PROCEDURE — 96365 THER/PROPH/DIAG IV INF INIT: CPT

## 2021-05-17 PROCEDURE — 87040 BLOOD CULTURE FOR BACTERIA: CPT | Mod: 59 | Performed by: EMERGENCY MEDICINE

## 2021-05-17 PROCEDURE — C9399 UNCLASSIFIED DRUGS OR BIOLOG: HCPCS | Performed by: STUDENT IN AN ORGANIZED HEALTH CARE EDUCATION/TRAINING PROGRAM

## 2021-05-17 PROCEDURE — 25000003 PHARM REV CODE 250: Performed by: STUDENT IN AN ORGANIZED HEALTH CARE EDUCATION/TRAINING PROGRAM

## 2021-05-17 PROCEDURE — 81001 URINALYSIS AUTO W/SCOPE: CPT | Performed by: EMERGENCY MEDICINE

## 2021-05-17 PROCEDURE — 87086 URINE CULTURE/COLONY COUNT: CPT | Performed by: EMERGENCY MEDICINE

## 2021-05-17 PROCEDURE — 93005 ELECTROCARDIOGRAM TRACING: CPT

## 2021-05-17 PROCEDURE — 63600175 PHARM REV CODE 636 W HCPCS: Performed by: STUDENT IN AN ORGANIZED HEALTH CARE EDUCATION/TRAINING PROGRAM

## 2021-05-17 PROCEDURE — 99285 PR EMERGENCY DEPT VISIT,LEVEL V: ICD-10-PCS | Mod: CS,,, | Performed by: EMERGENCY MEDICINE

## 2021-05-17 PROCEDURE — 85025 COMPLETE CBC W/AUTO DIFF WBC: CPT | Performed by: EMERGENCY MEDICINE

## 2021-05-17 PROCEDURE — 99285 EMERGENCY DEPT VISIT HI MDM: CPT | Mod: CS,,, | Performed by: EMERGENCY MEDICINE

## 2021-05-17 PROCEDURE — 11000001 HC ACUTE MED/SURG PRIVATE ROOM

## 2021-05-17 PROCEDURE — 93010 EKG 12-LEAD: ICD-10-PCS | Mod: ,,, | Performed by: INTERNAL MEDICINE

## 2021-05-17 PROCEDURE — 99223 PR INITIAL HOSPITAL CARE,LEVL III: ICD-10-PCS | Mod: AI,GC,, | Performed by: HOSPITALIST

## 2021-05-17 RX ORDER — ONDANSETRON 8 MG/1
8 TABLET, ORALLY DISINTEGRATING ORAL EVERY 8 HOURS PRN
Status: DISCONTINUED | OUTPATIENT
Start: 2021-05-17 | End: 2021-05-17

## 2021-05-17 RX ORDER — ACETAMINOPHEN 325 MG/1
650 TABLET ORAL EVERY 4 HOURS PRN
Status: DISCONTINUED | OUTPATIENT
Start: 2021-05-17 | End: 2021-05-18 | Stop reason: HOSPADM

## 2021-05-17 RX ORDER — MUPIROCIN 20 MG/G
OINTMENT TOPICAL 2 TIMES DAILY
Status: DISCONTINUED | OUTPATIENT
Start: 2021-05-17 | End: 2021-05-18 | Stop reason: HOSPADM

## 2021-05-17 RX ORDER — HYDRALAZINE HYDROCHLORIDE 10 MG/1
10 TABLET, FILM COATED ORAL EVERY 8 HOURS PRN
Status: DISCONTINUED | OUTPATIENT
Start: 2021-05-17 | End: 2021-05-18 | Stop reason: HOSPADM

## 2021-05-17 RX ORDER — CLOPIDOGREL BISULFATE 75 MG/1
75 TABLET ORAL DAILY
Status: DISCONTINUED | OUTPATIENT
Start: 2021-05-18 | End: 2021-05-18 | Stop reason: HOSPADM

## 2021-05-17 RX ORDER — HEPARIN SODIUM 5000 [USP'U]/ML
5000 INJECTION, SOLUTION INTRAVENOUS; SUBCUTANEOUS EVERY 8 HOURS
Status: DISCONTINUED | OUTPATIENT
Start: 2021-05-17 | End: 2021-05-18 | Stop reason: HOSPADM

## 2021-05-17 RX ORDER — HYDRALAZINE HYDROCHLORIDE 25 MG/1
25 TABLET, FILM COATED ORAL
Status: COMPLETED | OUTPATIENT
Start: 2021-05-17 | End: 2021-05-17

## 2021-05-17 RX ORDER — NAPROXEN SODIUM 220 MG/1
81 TABLET, FILM COATED ORAL DAILY
Status: DISCONTINUED | OUTPATIENT
Start: 2021-05-18 | End: 2021-05-18 | Stop reason: HOSPADM

## 2021-05-17 RX ORDER — NIFEDIPINE 30 MG/1
30 TABLET, EXTENDED RELEASE ORAL DAILY
Status: DISCONTINUED | OUTPATIENT
Start: 2021-05-17 | End: 2021-05-18

## 2021-05-17 RX ORDER — AMOXICILLIN 250 MG
1 CAPSULE ORAL DAILY PRN
Status: DISCONTINUED | OUTPATIENT
Start: 2021-05-17 | End: 2021-05-18 | Stop reason: HOSPADM

## 2021-05-17 RX ORDER — ENOXAPARIN SODIUM 100 MG/ML
30 INJECTION SUBCUTANEOUS EVERY 24 HOURS
Status: DISCONTINUED | OUTPATIENT
Start: 2021-05-17 | End: 2021-05-17

## 2021-05-17 RX ORDER — INSULIN ASPART 100 [IU]/ML
0-5 INJECTION, SOLUTION INTRAVENOUS; SUBCUTANEOUS
Status: DISCONTINUED | OUTPATIENT
Start: 2021-05-17 | End: 2021-05-18 | Stop reason: HOSPADM

## 2021-05-17 RX ORDER — POLYETHYLENE GLYCOL 3350 17 G/17G
17 POWDER, FOR SOLUTION ORAL 2 TIMES DAILY PRN
Status: DISCONTINUED | OUTPATIENT
Start: 2021-05-17 | End: 2021-05-18 | Stop reason: HOSPADM

## 2021-05-17 RX ORDER — GLUCAGON 1 MG
1 KIT INJECTION
Status: DISCONTINUED | OUTPATIENT
Start: 2021-05-17 | End: 2021-05-18 | Stop reason: HOSPADM

## 2021-05-17 RX ORDER — IBUPROFEN 200 MG
16 TABLET ORAL
Status: DISCONTINUED | OUTPATIENT
Start: 2021-05-17 | End: 2021-05-18 | Stop reason: HOSPADM

## 2021-05-17 RX ORDER — SODIUM CHLORIDE 0.9 % (FLUSH) 0.9 %
10 SYRINGE (ML) INJECTION
Status: DISCONTINUED | OUTPATIENT
Start: 2021-05-17 | End: 2021-05-18 | Stop reason: HOSPADM

## 2021-05-17 RX ORDER — TALC
6 POWDER (GRAM) TOPICAL NIGHTLY PRN
Status: DISCONTINUED | OUTPATIENT
Start: 2021-05-17 | End: 2021-05-18 | Stop reason: HOSPADM

## 2021-05-17 RX ORDER — IBUPROFEN 200 MG
24 TABLET ORAL
Status: DISCONTINUED | OUTPATIENT
Start: 2021-05-17 | End: 2021-05-18 | Stop reason: HOSPADM

## 2021-05-17 RX ORDER — ATORVASTATIN CALCIUM 20 MG/1
40 TABLET, FILM COATED ORAL NIGHTLY
Status: DISCONTINUED | OUTPATIENT
Start: 2021-05-17 | End: 2021-05-18 | Stop reason: HOSPADM

## 2021-05-17 RX ADMIN — INSULIN DETEMIR 15 UNITS: 100 INJECTION, SOLUTION SUBCUTANEOUS at 08:05

## 2021-05-17 RX ADMIN — HYDRALAZINE HYDROCHLORIDE 10 MG: 10 TABLET, FILM COATED ORAL at 10:05

## 2021-05-17 RX ADMIN — HYDRALAZINE HYDROCHLORIDE 25 MG: 25 TABLET, FILM COATED ORAL at 03:05

## 2021-05-17 RX ADMIN — HEPARIN SODIUM 5000 UNITS: 5000 INJECTION INTRAVENOUS; SUBCUTANEOUS at 10:05

## 2021-05-17 RX ADMIN — ATORVASTATIN CALCIUM 40 MG: 20 TABLET, FILM COATED ORAL at 08:05

## 2021-05-17 RX ADMIN — ERTAPENEM 0.5 G: 1 INJECTION INTRAMUSCULAR; INTRAVENOUS at 03:05

## 2021-05-17 RX ADMIN — NIFEDIPINE 30 MG: 30 TABLET, FILM COATED, EXTENDED RELEASE ORAL at 06:05

## 2021-05-18 ENCOUNTER — PATIENT OUTREACH (OUTPATIENT)
Dept: ADMINISTRATIVE | Facility: OTHER | Age: 76
End: 2021-05-18

## 2021-05-18 VITALS
OXYGEN SATURATION: 95 % | WEIGHT: 257.94 LBS | BODY MASS INDEX: 40.48 KG/M2 | DIASTOLIC BLOOD PRESSURE: 63 MMHG | TEMPERATURE: 99 F | RESPIRATION RATE: 20 BRPM | HEART RATE: 66 BPM | SYSTOLIC BLOOD PRESSURE: 138 MMHG | HEIGHT: 67 IN

## 2021-05-18 LAB
ALBUMIN SERPL BCP-MCNC: 2.3 G/DL (ref 3.5–5.2)
ALP SERPL-CCNC: 201 U/L (ref 55–135)
ALT SERPL W/O P-5'-P-CCNC: 10 U/L (ref 10–44)
ANION GAP SERPL CALC-SCNC: 9 MMOL/L (ref 8–16)
AST SERPL-CCNC: 13 U/L (ref 10–40)
BACTERIA BLD CULT: NORMAL
BACTERIA UR CULT: ABNORMAL
BACTERIA UR CULT: NO GROWTH
BASOPHILS # BLD AUTO: 0.02 K/UL (ref 0–0.2)
BASOPHILS NFR BLD: 0.5 % (ref 0–1.9)
BILIRUB SERPL-MCNC: 0.4 MG/DL (ref 0.1–1)
BUN SERPL-MCNC: 37 MG/DL (ref 8–23)
CALCIUM SERPL-MCNC: 9.6 MG/DL (ref 8.7–10.5)
CHLORIDE SERPL-SCNC: 108 MMOL/L (ref 95–110)
CO2 SERPL-SCNC: 21 MMOL/L (ref 23–29)
CREAT SERPL-MCNC: 1.7 MG/DL (ref 0.5–1.4)
DIFFERENTIAL METHOD: ABNORMAL
EOSINOPHIL # BLD AUTO: 0.1 K/UL (ref 0–0.5)
EOSINOPHIL NFR BLD: 1.6 % (ref 0–8)
ERYTHROCYTE [DISTWIDTH] IN BLOOD BY AUTOMATED COUNT: 14.2 % (ref 11.5–14.5)
EST. GFR  (AFRICAN AMERICAN): 32.8 ML/MIN/1.73 M^2
EST. GFR  (NON AFRICAN AMERICAN): 28.5 ML/MIN/1.73 M^2
GLUCOSE SERPL-MCNC: 114 MG/DL (ref 70–110)
HCT VFR BLD AUTO: 29.3 % (ref 37–48.5)
HGB BLD-MCNC: 9.6 G/DL (ref 12–16)
IMM GRANULOCYTES # BLD AUTO: 0.01 K/UL (ref 0–0.04)
IMM GRANULOCYTES NFR BLD AUTO: 0.3 % (ref 0–0.5)
LYMPHOCYTES # BLD AUTO: 1 K/UL (ref 1–4.8)
LYMPHOCYTES NFR BLD: 26.2 % (ref 18–48)
MAGNESIUM SERPL-MCNC: 1.9 MG/DL (ref 1.6–2.6)
MCH RBC QN AUTO: 27.8 PG (ref 27–31)
MCHC RBC AUTO-ENTMCNC: 32.8 G/DL (ref 32–36)
MCV RBC AUTO: 85 FL (ref 82–98)
MONOCYTES # BLD AUTO: 0.4 K/UL (ref 0.3–1)
MONOCYTES NFR BLD: 11 % (ref 4–15)
NEUTROPHILS # BLD AUTO: 2.3 K/UL (ref 1.8–7.7)
NEUTROPHILS NFR BLD: 60.4 % (ref 38–73)
NRBC BLD-RTO: 0 /100 WBC
PHOSPHATE SERPL-MCNC: 2.9 MG/DL (ref 2.7–4.5)
PLATELET # BLD AUTO: 241 K/UL (ref 150–450)
PMV BLD AUTO: 9 FL (ref 9.2–12.9)
POCT GLUCOSE: 121 MG/DL (ref 70–110)
POCT GLUCOSE: 122 MG/DL (ref 70–110)
POCT GLUCOSE: 142 MG/DL (ref 70–110)
POTASSIUM SERPL-SCNC: 4.3 MMOL/L (ref 3.5–5.1)
PROT SERPL-MCNC: 6.7 G/DL (ref 6–8.4)
RBC # BLD AUTO: 3.45 M/UL (ref 4–5.4)
SODIUM SERPL-SCNC: 138 MMOL/L (ref 136–145)
WBC # BLD AUTO: 3.81 K/UL (ref 3.9–12.7)

## 2021-05-18 PROCEDURE — 36415 COLL VENOUS BLD VENIPUNCTURE: CPT | Performed by: STUDENT IN AN ORGANIZED HEALTH CARE EDUCATION/TRAINING PROGRAM

## 2021-05-18 PROCEDURE — 83735 ASSAY OF MAGNESIUM: CPT | Performed by: STUDENT IN AN ORGANIZED HEALTH CARE EDUCATION/TRAINING PROGRAM

## 2021-05-18 PROCEDURE — 25000003 PHARM REV CODE 250: Performed by: STUDENT IN AN ORGANIZED HEALTH CARE EDUCATION/TRAINING PROGRAM

## 2021-05-18 PROCEDURE — 99238 PR HOSPITAL DISCHARGE DAY,<30 MIN: ICD-10-PCS | Mod: GC,,, | Performed by: HOSPITALIST

## 2021-05-18 PROCEDURE — 99222 PR INITIAL HOSPITAL CARE,LEVL II: ICD-10-PCS | Mod: ,,, | Performed by: INTERNAL MEDICINE

## 2021-05-18 PROCEDURE — 80053 COMPREHEN METABOLIC PANEL: CPT | Performed by: STUDENT IN AN ORGANIZED HEALTH CARE EDUCATION/TRAINING PROGRAM

## 2021-05-18 PROCEDURE — 99238 HOSP IP/OBS DSCHRG MGMT 30/<: CPT | Mod: GC,,, | Performed by: HOSPITALIST

## 2021-05-18 PROCEDURE — 99222 1ST HOSP IP/OBS MODERATE 55: CPT | Mod: ,,, | Performed by: INTERNAL MEDICINE

## 2021-05-18 PROCEDURE — 85025 COMPLETE CBC W/AUTO DIFF WBC: CPT | Performed by: STUDENT IN AN ORGANIZED HEALTH CARE EDUCATION/TRAINING PROGRAM

## 2021-05-18 PROCEDURE — 63600175 PHARM REV CODE 636 W HCPCS: Performed by: STUDENT IN AN ORGANIZED HEALTH CARE EDUCATION/TRAINING PROGRAM

## 2021-05-18 PROCEDURE — 1111F DSCHRG MED/CURRENT MED MERGE: CPT | Mod: CPTII,GC,, | Performed by: HOSPITALIST

## 2021-05-18 PROCEDURE — 97165 OT EVAL LOW COMPLEX 30 MIN: CPT

## 2021-05-18 PROCEDURE — 97161 PT EVAL LOW COMPLEX 20 MIN: CPT

## 2021-05-18 PROCEDURE — 97530 THERAPEUTIC ACTIVITIES: CPT

## 2021-05-18 PROCEDURE — 1111F PR DISCHARGE MEDS RECONCILED W/ CURRENT OUTPATIENT MED LIST: ICD-10-PCS | Mod: CPTII,GC,, | Performed by: HOSPITALIST

## 2021-05-18 PROCEDURE — 84100 ASSAY OF PHOSPHORUS: CPT | Performed by: STUDENT IN AN ORGANIZED HEALTH CARE EDUCATION/TRAINING PROGRAM

## 2021-05-18 RX ORDER — NIFEDIPINE 60 MG/1
60 TABLET, EXTENDED RELEASE ORAL DAILY
Qty: 30 TABLET | Refills: 11 | Status: SHIPPED | OUTPATIENT
Start: 2021-05-19 | End: 2021-12-06 | Stop reason: SDUPTHER

## 2021-05-18 RX ORDER — DOXYCYCLINE HYCLATE 100 MG
100 TABLET ORAL EVERY 12 HOURS
Status: DISCONTINUED | OUTPATIENT
Start: 2021-05-18 | End: 2021-05-18 | Stop reason: HOSPADM

## 2021-05-18 RX ORDER — NIFEDIPINE 60 MG/1
60 TABLET, EXTENDED RELEASE ORAL DAILY
Status: DISCONTINUED | OUTPATIENT
Start: 2021-05-18 | End: 2021-05-18 | Stop reason: HOSPADM

## 2021-05-18 RX ORDER — DOXYCYCLINE HYCLATE 100 MG
100 TABLET ORAL EVERY 12 HOURS
Qty: 20 TABLET | Refills: 0 | Status: SHIPPED | OUTPATIENT
Start: 2021-05-18 | End: 2021-05-28

## 2021-05-18 RX ADMIN — CLOPIDOGREL 75 MG: 75 TABLET, FILM COATED ORAL at 08:05

## 2021-05-18 RX ADMIN — ASPIRIN 81 MG CHEWABLE TABLET 81 MG: 81 TABLET CHEWABLE at 08:05

## 2021-05-18 RX ADMIN — NIFEDIPINE 60 MG: 60 TABLET, FILM COATED, EXTENDED RELEASE ORAL at 08:05

## 2021-05-18 RX ADMIN — HEPARIN SODIUM 5000 UNITS: 5000 INJECTION INTRAVENOUS; SUBCUTANEOUS at 02:05

## 2021-05-18 RX ADMIN — DOXYCYCLINE HYCLATE 100 MG: 100 TABLET, COATED ORAL at 02:05

## 2021-05-18 RX ADMIN — HEPARIN SODIUM 5000 UNITS: 5000 INJECTION INTRAVENOUS; SUBCUTANEOUS at 05:05

## 2021-05-19 ENCOUNTER — PATIENT OUTREACH (OUTPATIENT)
Dept: ADMINISTRATIVE | Facility: CLINIC | Age: 76
End: 2021-05-19

## 2021-05-19 ENCOUNTER — LAB VISIT (OUTPATIENT)
Dept: LAB | Facility: HOSPITAL | Age: 76
End: 2021-05-19
Attending: INTERNAL MEDICINE
Payer: MEDICARE

## 2021-05-19 ENCOUNTER — OFFICE VISIT (OUTPATIENT)
Dept: WOUND CARE | Facility: CLINIC | Age: 76
End: 2021-05-19
Payer: MEDICARE

## 2021-05-19 VITALS
HEART RATE: 75 BPM | TEMPERATURE: 97 F | WEIGHT: 257 LBS | HEIGHT: 67 IN | SYSTOLIC BLOOD PRESSURE: 153 MMHG | BODY MASS INDEX: 40.34 KG/M2 | DIASTOLIC BLOOD PRESSURE: 82 MMHG

## 2021-05-19 DIAGNOSIS — I89.0 LYMPHEDEMA: ICD-10-CM

## 2021-05-19 DIAGNOSIS — L97.821 VARICOSE VEINS OF LEFT LOWER EXTREMITY WITH ULCER OF OTHER PART OF LOWER LEG LIMITED TO BREAKDOWN OF SKIN: Primary | ICD-10-CM

## 2021-05-19 DIAGNOSIS — I83.028 VARICOSE VEINS OF LEFT LOWER EXTREMITY WITH ULCER OF OTHER PART OF LOWER LEG LIMITED TO BREAKDOWN OF SKIN: Primary | ICD-10-CM

## 2021-05-19 DIAGNOSIS — R79.89 ELEVATED PARATHYROID HORMONE: ICD-10-CM

## 2021-05-19 DIAGNOSIS — I87.2 VENOUS INSUFFICIENCY OF BOTH LOWER EXTREMITIES: Chronic | ICD-10-CM

## 2021-05-19 PROBLEM — I83.018 VARICOSE VEINS OF RIGHT LOWER EXTREMITY WITH ULCER OTHER PART OF LOWER LEG: Status: RESOLVED | Noted: 2020-11-13 | Resolved: 2021-05-19

## 2021-05-19 PROBLEM — L97.819 VARICOSE VEINS OF RIGHT LOWER EXTREMITY WITH ULCER OTHER PART OF LOWER LEG: Status: RESOLVED | Noted: 2020-11-13 | Resolved: 2021-05-19

## 2021-05-19 LAB — PTH-INTACT SERPL-MCNC: 189 PG/ML (ref 9–77)

## 2021-05-19 PROCEDURE — 99499 UNLISTED E&M SERVICE: CPT | Mod: S$GLB,,, | Performed by: NURSE PRACTITIONER

## 2021-05-19 PROCEDURE — 29580 PR STRAPPING UNNA BOOT: ICD-10-PCS | Mod: LT,S$GLB,, | Performed by: NURSE PRACTITIONER

## 2021-05-19 PROCEDURE — 1126F AMNT PAIN NOTED NONE PRSNT: CPT | Mod: S$GLB,,, | Performed by: NURSE PRACTITIONER

## 2021-05-19 PROCEDURE — 29580 STRAPPING UNNA BOOT: CPT | Mod: LT,S$GLB,, | Performed by: NURSE PRACTITIONER

## 2021-05-19 PROCEDURE — 83970 ASSAY OF PARATHORMONE: CPT | Performed by: INTERNAL MEDICINE

## 2021-05-19 PROCEDURE — 99999 PR PBB SHADOW E&M-EST. PATIENT-LVL V: CPT | Mod: PBBFAC,,, | Performed by: NURSE PRACTITIONER

## 2021-05-19 PROCEDURE — 99999 PR PBB SHADOW E&M-EST. PATIENT-LVL V: ICD-10-PCS | Mod: PBBFAC,,, | Performed by: NURSE PRACTITIONER

## 2021-05-19 PROCEDURE — 99499 NO LOS: ICD-10-PCS | Mod: S$GLB,,, | Performed by: NURSE PRACTITIONER

## 2021-05-19 PROCEDURE — 1126F PR PAIN SEVERITY QUANTIFIED, NO PAIN PRESENT: ICD-10-PCS | Mod: S$GLB,,, | Performed by: NURSE PRACTITIONER

## 2021-05-19 PROCEDURE — 36415 COLL VENOUS BLD VENIPUNCTURE: CPT | Performed by: INTERNAL MEDICINE

## 2021-05-20 ENCOUNTER — PATIENT MESSAGE (OUTPATIENT)
Dept: ADMINISTRATIVE | Facility: CLINIC | Age: 76
End: 2021-05-20

## 2021-05-22 LAB
BACTERIA BLD CULT: NORMAL
BACTERIA BLD CULT: NORMAL

## 2021-05-24 ENCOUNTER — DOCUMENT SCAN (OUTPATIENT)
Dept: HOME HEALTH SERVICES | Facility: HOSPITAL | Age: 76
End: 2021-05-24
Payer: MEDICARE

## 2021-06-11 ENCOUNTER — TELEPHONE (OUTPATIENT)
Dept: WOUND CARE | Facility: CLINIC | Age: 76
End: 2021-06-11

## 2021-06-15 ENCOUNTER — DOCUMENT SCAN (OUTPATIENT)
Dept: HOME HEALTH SERVICES | Facility: HOSPITAL | Age: 76
End: 2021-06-15
Payer: MEDICARE

## 2021-06-17 ENCOUNTER — DOCUMENT SCAN (OUTPATIENT)
Dept: HOME HEALTH SERVICES | Facility: HOSPITAL | Age: 76
End: 2021-06-17
Payer: MEDICARE

## 2021-06-17 ENCOUNTER — PATIENT MESSAGE (OUTPATIENT)
Dept: WOUND CARE | Facility: CLINIC | Age: 76
End: 2021-06-17

## 2021-06-18 ENCOUNTER — TELEPHONE (OUTPATIENT)
Dept: WOUND CARE | Facility: CLINIC | Age: 76
End: 2021-06-18

## 2021-06-24 PROCEDURE — G0179 MD RECERTIFICATION HHA PT: HCPCS | Mod: ,,, | Performed by: INTERNAL MEDICINE

## 2021-06-24 PROCEDURE — G0179 PR HOME HEALTH MD RECERTIFICATION: ICD-10-PCS | Mod: ,,, | Performed by: INTERNAL MEDICINE

## 2021-06-28 ENCOUNTER — PATIENT OUTREACH (OUTPATIENT)
Dept: ADMINISTRATIVE | Facility: OTHER | Age: 76
End: 2021-06-28

## 2021-06-29 ENCOUNTER — OFFICE VISIT (OUTPATIENT)
Dept: WOUND CARE | Facility: CLINIC | Age: 76
End: 2021-06-29
Payer: MEDICARE

## 2021-06-29 VITALS
HEIGHT: 67 IN | DIASTOLIC BLOOD PRESSURE: 78 MMHG | SYSTOLIC BLOOD PRESSURE: 176 MMHG | WEIGHT: 257 LBS | TEMPERATURE: 97 F | HEART RATE: 69 BPM | BODY MASS INDEX: 40.34 KG/M2

## 2021-06-29 DIAGNOSIS — I83.028 VARICOSE VEINS OF LEFT LOWER EXTREMITY WITH ULCER OF OTHER PART OF LOWER LEG LIMITED TO BREAKDOWN OF SKIN: ICD-10-CM

## 2021-06-29 DIAGNOSIS — I89.0 LYMPHEDEMA: ICD-10-CM

## 2021-06-29 DIAGNOSIS — I83.018 VARICOSE VEINS OF RIGHT LOWER EXTREMITY WITH ULCER OTHER PART OF LOWER LEG: ICD-10-CM

## 2021-06-29 DIAGNOSIS — L97.819 VARICOSE VEINS OF RIGHT LOWER EXTREMITY WITH ULCER OTHER PART OF LOWER LEG: ICD-10-CM

## 2021-06-29 DIAGNOSIS — I87.2 VENOUS INSUFFICIENCY OF BOTH LOWER EXTREMITIES: Primary | ICD-10-CM

## 2021-06-29 DIAGNOSIS — L97.821 VARICOSE VEINS OF LEFT LOWER EXTREMITY WITH ULCER OF OTHER PART OF LOWER LEG LIMITED TO BREAKDOWN OF SKIN: ICD-10-CM

## 2021-06-29 PROCEDURE — 99999 PR PBB SHADOW E&M-EST. PATIENT-LVL IV: CPT | Mod: PBBFAC,,, | Performed by: NURSE PRACTITIONER

## 2021-06-29 PROCEDURE — 99999 PR PBB SHADOW E&M-EST. PATIENT-LVL IV: ICD-10-PCS | Mod: PBBFAC,,, | Performed by: NURSE PRACTITIONER

## 2021-06-29 PROCEDURE — 1126F AMNT PAIN NOTED NONE PRSNT: CPT | Mod: S$GLB,,, | Performed by: NURSE PRACTITIONER

## 2021-06-29 PROCEDURE — 1126F PR PAIN SEVERITY QUANTIFIED, NO PAIN PRESENT: ICD-10-PCS | Mod: S$GLB,,, | Performed by: NURSE PRACTITIONER

## 2021-06-29 PROCEDURE — 99499 NO LOS: ICD-10-PCS | Mod: S$GLB,,, | Performed by: NURSE PRACTITIONER

## 2021-06-29 PROCEDURE — 99499 UNLISTED E&M SERVICE: CPT | Mod: S$GLB,,, | Performed by: NURSE PRACTITIONER

## 2021-06-29 PROCEDURE — 29580 STRAPPING UNNA BOOT: CPT | Mod: 50,S$GLB,, | Performed by: NURSE PRACTITIONER

## 2021-06-29 PROCEDURE — 29580 PR STRAPPING UNNA BOOT: ICD-10-PCS | Mod: 50,S$GLB,, | Performed by: NURSE PRACTITIONER

## 2021-06-29 RX ORDER — INSULIN ASPART 100 [IU]/ML
INJECTION, SOLUTION INTRAVENOUS; SUBCUTANEOUS
COMMUNITY
Start: 2021-01-14 | End: 2021-12-06

## 2021-06-30 ENCOUNTER — OFFICE VISIT (OUTPATIENT)
Dept: INTERNAL MEDICINE | Facility: CLINIC | Age: 76
End: 2021-06-30
Payer: MEDICARE

## 2021-06-30 DIAGNOSIS — E11.42 TYPE 2 DIABETES MELLITUS WITH DIABETIC POLYNEUROPATHY, WITH LONG-TERM CURRENT USE OF INSULIN: Primary | Chronic | ICD-10-CM

## 2021-06-30 DIAGNOSIS — Z79.4 TYPE 2 DIABETES MELLITUS WITH DIABETIC POLYNEUROPATHY, WITH LONG-TERM CURRENT USE OF INSULIN: Primary | Chronic | ICD-10-CM

## 2021-06-30 PROCEDURE — 1159F MED LIST DOCD IN RCRD: CPT | Mod: 95,,, | Performed by: INTERNAL MEDICINE

## 2021-06-30 PROCEDURE — 1159F PR MEDICATION LIST DOCUMENTED IN MEDICAL RECORD: ICD-10-PCS | Mod: 95,,, | Performed by: INTERNAL MEDICINE

## 2021-06-30 PROCEDURE — 99213 OFFICE O/P EST LOW 20 MIN: CPT | Mod: 95,,, | Performed by: INTERNAL MEDICINE

## 2021-06-30 PROCEDURE — 99213 PR OFFICE/OUTPT VISIT, EST, LEVL III, 20-29 MIN: ICD-10-PCS | Mod: 95,,, | Performed by: INTERNAL MEDICINE

## 2021-06-30 PROCEDURE — 3052F HG A1C>EQUAL 8.0%<EQUAL 9.0%: CPT | Mod: CPTII,95,, | Performed by: INTERNAL MEDICINE

## 2021-06-30 PROCEDURE — 3052F PR MOST RECENT HEMOGLOBIN A1C LEVEL 8.0 - < 9.0%: ICD-10-PCS | Mod: CPTII,95,, | Performed by: INTERNAL MEDICINE

## 2021-07-01 ENCOUNTER — EXTERNAL HOME HEALTH (OUTPATIENT)
Dept: HOME HEALTH SERVICES | Facility: HOSPITAL | Age: 76
End: 2021-07-01
Payer: MEDICARE

## 2021-07-02 RX ORDER — CLOPIDOGREL BISULFATE 75 MG/1
75 TABLET ORAL DAILY
Qty: 90 TABLET | Refills: 3 | Status: SHIPPED | OUTPATIENT
Start: 2021-07-02 | End: 2022-07-20

## 2021-07-13 ENCOUNTER — TELEPHONE (OUTPATIENT)
Dept: INTERNAL MEDICINE | Facility: CLINIC | Age: 76
End: 2021-07-13

## 2021-07-16 ENCOUNTER — OFFICE VISIT (OUTPATIENT)
Dept: NEPHROLOGY | Facility: CLINIC | Age: 76
End: 2021-07-16
Payer: MEDICARE

## 2021-07-16 VITALS
HEIGHT: 67 IN | WEIGHT: 257.06 LBS | BODY MASS INDEX: 40.35 KG/M2 | SYSTOLIC BLOOD PRESSURE: 130 MMHG | OXYGEN SATURATION: 98 % | DIASTOLIC BLOOD PRESSURE: 74 MMHG | HEART RATE: 72 BPM

## 2021-07-16 DIAGNOSIS — I10 ESSENTIAL HYPERTENSION: ICD-10-CM

## 2021-07-16 DIAGNOSIS — E66.01 CLASS 3 SEVERE OBESITY WITH BODY MASS INDEX (BMI) OF 40.0 TO 44.9 IN ADULT, UNSPECIFIED OBESITY TYPE, UNSPECIFIED WHETHER SERIOUS COMORBIDITY PRESENT: ICD-10-CM

## 2021-07-16 DIAGNOSIS — E11.649 TYPE 2 DIABETES MELLITUS WITH HYPOGLYCEMIA WITHOUT COMA, WITH LONG-TERM CURRENT USE OF INSULIN: ICD-10-CM

## 2021-07-16 DIAGNOSIS — Z79.4 TYPE 2 DIABETES MELLITUS WITH HYPOGLYCEMIA WITHOUT COMA, WITH LONG-TERM CURRENT USE OF INSULIN: ICD-10-CM

## 2021-07-16 DIAGNOSIS — I10 HYPERTENSION, UNSPECIFIED TYPE: ICD-10-CM

## 2021-07-16 DIAGNOSIS — N18.32 STAGE 3B CHRONIC KIDNEY DISEASE: Primary | ICD-10-CM

## 2021-07-16 DIAGNOSIS — D64.9 ANEMIA, UNSPECIFIED TYPE: ICD-10-CM

## 2021-07-16 PROCEDURE — 99999 PR PBB SHADOW E&M-EST. PATIENT-LVL IV: CPT | Mod: PBBFAC,,, | Performed by: NURSE PRACTITIONER

## 2021-07-16 PROCEDURE — 3075F SYST BP GE 130 - 139MM HG: CPT | Mod: CPTII,S$GLB,, | Performed by: NURSE PRACTITIONER

## 2021-07-16 PROCEDURE — 1159F PR MEDICATION LIST DOCUMENTED IN MEDICAL RECORD: ICD-10-PCS | Mod: S$GLB,,, | Performed by: NURSE PRACTITIONER

## 2021-07-16 PROCEDURE — 3052F HG A1C>EQUAL 8.0%<EQUAL 9.0%: CPT | Mod: CPTII,S$GLB,, | Performed by: NURSE PRACTITIONER

## 2021-07-16 PROCEDURE — 99499 RISK ADDL DX/OHS AUDIT: ICD-10-PCS | Mod: HCNC,S$GLB,, | Performed by: NURSE PRACTITIONER

## 2021-07-16 PROCEDURE — 1126F AMNT PAIN NOTED NONE PRSNT: CPT | Mod: S$GLB,,, | Performed by: NURSE PRACTITIONER

## 2021-07-16 PROCEDURE — 1126F PR PAIN SEVERITY QUANTIFIED, NO PAIN PRESENT: ICD-10-PCS | Mod: S$GLB,,, | Performed by: NURSE PRACTITIONER

## 2021-07-16 PROCEDURE — 3052F PR MOST RECENT HEMOGLOBIN A1C LEVEL 8.0 - < 9.0%: ICD-10-PCS | Mod: CPTII,S$GLB,, | Performed by: NURSE PRACTITIONER

## 2021-07-16 PROCEDURE — 1101F PR PT FALLS ASSESS DOC 0-1 FALLS W/OUT INJ PAST YR: ICD-10-PCS | Mod: CPTII,S$GLB,, | Performed by: NURSE PRACTITIONER

## 2021-07-16 PROCEDURE — 99214 OFFICE O/P EST MOD 30 MIN: CPT | Mod: S$GLB,,, | Performed by: NURSE PRACTITIONER

## 2021-07-16 PROCEDURE — 99214 PR OFFICE/OUTPT VISIT, EST, LEVL IV, 30-39 MIN: ICD-10-PCS | Mod: S$GLB,,, | Performed by: NURSE PRACTITIONER

## 2021-07-16 PROCEDURE — 3075F PR MOST RECENT SYSTOLIC BLOOD PRESS GE 130-139MM HG: ICD-10-PCS | Mod: CPTII,S$GLB,, | Performed by: NURSE PRACTITIONER

## 2021-07-16 PROCEDURE — 99999 PR PBB SHADOW E&M-EST. PATIENT-LVL IV: ICD-10-PCS | Mod: PBBFAC,,, | Performed by: NURSE PRACTITIONER

## 2021-07-16 PROCEDURE — 3288F FALL RISK ASSESSMENT DOCD: CPT | Mod: CPTII,S$GLB,, | Performed by: NURSE PRACTITIONER

## 2021-07-16 PROCEDURE — 1159F MED LIST DOCD IN RCRD: CPT | Mod: S$GLB,,, | Performed by: NURSE PRACTITIONER

## 2021-07-16 PROCEDURE — 1101F PT FALLS ASSESS-DOCD LE1/YR: CPT | Mod: CPTII,S$GLB,, | Performed by: NURSE PRACTITIONER

## 2021-07-16 PROCEDURE — 3288F PR FALLS RISK ASSESSMENT DOCUMENTED: ICD-10-PCS | Mod: CPTII,S$GLB,, | Performed by: NURSE PRACTITIONER

## 2021-07-16 PROCEDURE — 99499 UNLISTED E&M SERVICE: CPT | Mod: HCNC,S$GLB,, | Performed by: NURSE PRACTITIONER

## 2021-07-16 PROCEDURE — 3078F DIAST BP <80 MM HG: CPT | Mod: CPTII,S$GLB,, | Performed by: NURSE PRACTITIONER

## 2021-07-16 PROCEDURE — 3078F PR MOST RECENT DIASTOLIC BLOOD PRESSURE < 80 MM HG: ICD-10-PCS | Mod: CPTII,S$GLB,, | Performed by: NURSE PRACTITIONER

## 2021-07-21 ENCOUNTER — OFFICE VISIT (OUTPATIENT)
Dept: WOUND CARE | Facility: CLINIC | Age: 76
End: 2021-07-21
Payer: MEDICARE

## 2021-07-21 VITALS
BODY MASS INDEX: 40.34 KG/M2 | TEMPERATURE: 97 F | HEIGHT: 67 IN | DIASTOLIC BLOOD PRESSURE: 67 MMHG | SYSTOLIC BLOOD PRESSURE: 136 MMHG | WEIGHT: 257 LBS | HEART RATE: 77 BPM

## 2021-07-21 DIAGNOSIS — R82.998 URINE WBC INCREASED: Primary | ICD-10-CM

## 2021-07-21 DIAGNOSIS — I87.2 VENOUS INSUFFICIENCY OF BOTH LOWER EXTREMITIES: Primary | Chronic | ICD-10-CM

## 2021-07-21 DIAGNOSIS — R60.0 BILATERAL LEG EDEMA: ICD-10-CM

## 2021-07-21 PROBLEM — I83.029 VARICOSE VEINS OF LEFT LOWER EXTREMITY WITH ULCER LIMITED TO BREAKDOWN OF SKIN: Status: RESOLVED | Noted: 2020-08-14 | Resolved: 2021-07-21

## 2021-07-21 PROBLEM — L97.921 VARICOSE VEINS OF LEFT LOWER EXTREMITY WITH ULCER LIMITED TO BREAKDOWN OF SKIN: Status: RESOLVED | Noted: 2020-08-14 | Resolved: 2021-07-21

## 2021-07-21 PROCEDURE — 3078F DIAST BP <80 MM HG: CPT | Mod: CPTII,S$GLB,, | Performed by: NURSE PRACTITIONER

## 2021-07-21 PROCEDURE — 1159F MED LIST DOCD IN RCRD: CPT | Mod: CPTII,S$GLB,, | Performed by: NURSE PRACTITIONER

## 2021-07-21 PROCEDURE — 1126F PR PAIN SEVERITY QUANTIFIED, NO PAIN PRESENT: ICD-10-PCS | Mod: CPTII,S$GLB,, | Performed by: NURSE PRACTITIONER

## 2021-07-21 PROCEDURE — 1159F PR MEDICATION LIST DOCUMENTED IN MEDICAL RECORD: ICD-10-PCS | Mod: CPTII,S$GLB,, | Performed by: NURSE PRACTITIONER

## 2021-07-21 PROCEDURE — 99999 PR PBB SHADOW E&M-EST. PATIENT-LVL V: ICD-10-PCS | Mod: PBBFAC,,, | Performed by: NURSE PRACTITIONER

## 2021-07-21 PROCEDURE — 3078F PR MOST RECENT DIASTOLIC BLOOD PRESSURE < 80 MM HG: ICD-10-PCS | Mod: CPTII,S$GLB,, | Performed by: NURSE PRACTITIONER

## 2021-07-21 PROCEDURE — 1126F AMNT PAIN NOTED NONE PRSNT: CPT | Mod: CPTII,S$GLB,, | Performed by: NURSE PRACTITIONER

## 2021-07-21 PROCEDURE — 3075F SYST BP GE 130 - 139MM HG: CPT | Mod: CPTII,S$GLB,, | Performed by: NURSE PRACTITIONER

## 2021-07-21 PROCEDURE — 3075F PR MOST RECENT SYSTOLIC BLOOD PRESS GE 130-139MM HG: ICD-10-PCS | Mod: CPTII,S$GLB,, | Performed by: NURSE PRACTITIONER

## 2021-07-21 PROCEDURE — 99212 PR OFFICE/OUTPT VISIT, EST, LEVL II, 10-19 MIN: ICD-10-PCS | Mod: S$GLB,,, | Performed by: NURSE PRACTITIONER

## 2021-07-21 PROCEDURE — 99212 OFFICE O/P EST SF 10 MIN: CPT | Mod: S$GLB,,, | Performed by: NURSE PRACTITIONER

## 2021-07-21 PROCEDURE — 99999 PR PBB SHADOW E&M-EST. PATIENT-LVL V: CPT | Mod: PBBFAC,,, | Performed by: NURSE PRACTITIONER

## 2021-07-23 ENCOUNTER — TELEPHONE (OUTPATIENT)
Dept: NEPHROLOGY | Facility: CLINIC | Age: 76
End: 2021-07-23

## 2021-07-26 ENCOUNTER — TELEPHONE (OUTPATIENT)
Dept: NEPHROLOGY | Facility: CLINIC | Age: 76
End: 2021-07-26

## 2021-07-27 ENCOUNTER — TELEPHONE (OUTPATIENT)
Dept: INTERNAL MEDICINE | Facility: CLINIC | Age: 76
End: 2021-07-27

## 2021-07-28 ENCOUNTER — PATIENT MESSAGE (OUTPATIENT)
Dept: INTERNAL MEDICINE | Facility: CLINIC | Age: 76
End: 2021-07-28

## 2021-07-28 ENCOUNTER — TELEPHONE (OUTPATIENT)
Dept: INTERNAL MEDICINE | Facility: CLINIC | Age: 76
End: 2021-07-28

## 2021-07-29 ENCOUNTER — LAB VISIT (OUTPATIENT)
Dept: LAB | Facility: HOSPITAL | Age: 76
End: 2021-07-29
Attending: INTERNAL MEDICINE
Payer: MEDICARE

## 2021-07-29 ENCOUNTER — PATIENT MESSAGE (OUTPATIENT)
Dept: NEPHROLOGY | Facility: CLINIC | Age: 76
End: 2021-07-29

## 2021-07-29 DIAGNOSIS — N18.32 STAGE 3B CHRONIC KIDNEY DISEASE: ICD-10-CM

## 2021-07-29 LAB
BACTERIA #/AREA URNS AUTO: ABNORMAL /HPF
BILIRUB UR QL STRIP: NEGATIVE
CLARITY UR REFRACT.AUTO: ABNORMAL
COLOR UR AUTO: YELLOW
CREAT UR-MCNC: 100 MG/DL (ref 15–325)
GLUCOSE UR QL STRIP: ABNORMAL
HGB UR QL STRIP: ABNORMAL
HYALINE CASTS UR QL AUTO: 34 /LPF
KETONES UR QL STRIP: NEGATIVE
LEUKOCYTE ESTERASE UR QL STRIP: ABNORMAL
MICROSCOPIC COMMENT: ABNORMAL
NITRITE UR QL STRIP: NEGATIVE
PH UR STRIP: 5 [PH] (ref 5–8)
PROT UR QL STRIP: ABNORMAL
PROT UR-MCNC: 222 MG/DL (ref 0–15)
PROT/CREAT UR: 2.22 MG/G{CREAT} (ref 0–0.2)
RBC #/AREA URNS AUTO: 0 /HPF (ref 0–4)
SP GR UR STRIP: 1.01 (ref 1–1.03)
SQUAMOUS #/AREA URNS AUTO: 4 /HPF
URN SPEC COLLECT METH UR: ABNORMAL
WBC #/AREA URNS AUTO: >100 /HPF (ref 0–5)
WBC CLUMPS UR QL AUTO: ABNORMAL
YEAST UR QL AUTO: ABNORMAL

## 2021-07-29 PROCEDURE — 87186 SC STD MICRODIL/AGAR DIL: CPT | Performed by: NURSE PRACTITIONER

## 2021-07-29 PROCEDURE — 87086 URINE CULTURE/COLONY COUNT: CPT | Performed by: NURSE PRACTITIONER

## 2021-07-29 PROCEDURE — 87088 URINE BACTERIA CULTURE: CPT | Performed by: NURSE PRACTITIONER

## 2021-07-29 PROCEDURE — 87077 CULTURE AEROBIC IDENTIFY: CPT | Performed by: NURSE PRACTITIONER

## 2021-07-29 PROCEDURE — 84156 ASSAY OF PROTEIN URINE: CPT | Performed by: NURSE PRACTITIONER

## 2021-07-29 PROCEDURE — 81001 URINALYSIS AUTO W/SCOPE: CPT | Performed by: NURSE PRACTITIONER

## 2021-07-30 ENCOUNTER — PATIENT MESSAGE (OUTPATIENT)
Dept: NEPHROLOGY | Facility: CLINIC | Age: 76
End: 2021-07-30

## 2021-07-30 DIAGNOSIS — R82.998 URINE WBC INCREASED: Primary | ICD-10-CM

## 2021-07-30 RX ORDER — DOXYCYCLINE 100 MG/1
100 CAPSULE ORAL EVERY 12 HOURS
Qty: 20 CAPSULE | Refills: 0 | Status: SHIPPED | OUTPATIENT
Start: 2021-07-30 | End: 2021-11-29

## 2021-08-02 ENCOUNTER — PATIENT MESSAGE (OUTPATIENT)
Dept: NEPHROLOGY | Facility: CLINIC | Age: 76
End: 2021-08-02

## 2021-08-02 DIAGNOSIS — N39.0 RECURRENT UTI: Primary | ICD-10-CM

## 2021-08-02 LAB — BACTERIA UR CULT: ABNORMAL

## 2021-08-03 ENCOUNTER — TELEPHONE (OUTPATIENT)
Dept: NEPHROLOGY | Facility: CLINIC | Age: 76
End: 2021-08-03

## 2021-08-03 ENCOUNTER — DOCUMENT SCAN (OUTPATIENT)
Dept: HOME HEALTH SERVICES | Facility: HOSPITAL | Age: 76
End: 2021-08-03
Payer: MEDICARE

## 2021-08-03 DIAGNOSIS — N18.32 STAGE 3B CHRONIC KIDNEY DISEASE: Primary | ICD-10-CM

## 2021-08-05 ENCOUNTER — DOCUMENT SCAN (OUTPATIENT)
Dept: HOME HEALTH SERVICES | Facility: HOSPITAL | Age: 76
End: 2021-08-05
Payer: MEDICARE

## 2021-08-13 ENCOUNTER — TELEPHONE (OUTPATIENT)
Dept: NEPHROLOGY | Facility: CLINIC | Age: 76
End: 2021-08-13

## 2021-08-16 NOTE — ASSESSMENT & PLAN NOTE
- glucose in   - home medications: Glimepiride 1 mg 2 tablets daily at lunchtime, Lantus 35 units qhs  - hospital regimen: insulin detemir 18u qhs + mod dose SSI for NPO patients  -add 4 units aspart TIDWM   - discharged with home meds   Detail Level: Detailed Depth Of Biopsy: dermis Was A Bandage Applied: Yes Size Of Lesion In Cm: 0 Biopsy Type: H and E Biopsy Method: Dermablade Anesthesia Type: 1% lidocaine with epinephrine Anesthesia Volume In Cc (Will Not Render If 0): 0.5 Hemostasis: Drysol Wound Care: Petrolatum Dressing: bandage Destruction After The Procedure: No Type Of Destruction Used: Curettage Curettage Text: The wound bed was treated with curettage after the biopsy was performed. Cryotherapy Text: The wound bed was treated with cryotherapy after the biopsy was performed. Electrodesiccation Text: The wound bed was treated with electrodesiccation after the biopsy was performed. Electrodesiccation And Curettage Text: The wound bed was treated with electrodesiccation and curettage after the biopsy was performed. Silver Nitrate Text: The wound bed was treated with silver nitrate after the biopsy was performed. Lab: [id number] Lab Facility: [id number] Consent: Written consent was obtained and risks were reviewed including but not limited to scarring, infection, bleeding, scabbing, incomplete removal, nerve damage and allergy to anesthesia. Post-Care Instructions: I reviewed with the patient in detail post-care instructions. Patient is to keep the biopsy site dry overnight, and then apply bacitracin twice daily until healed. Patient may apply hydrogen peroxide soaks to remove any crusting. Notification Instructions: Patient will be notified of biopsy results. However, patient instructed to call the office if not contacted within 2 weeks. Billing Type: Third-Party Bill Information: Selecting Yes will display possible errors in your note based on the variables you have selected. This validation is only offered as a suggestion for you. PLEASE NOTE THAT THE VALIDATION TEXT WILL BE REMOVED WHEN YOU FINALIZE YOUR NOTE. IF YOU WANT TO FAX A PRELIMINARY NOTE YOU WILL NEED TO TOGGLE THIS TO 'NO' IF YOU DO NOT WANT IT IN YOUR FAXED NOTE. Lab: [id number] Lab Facility: [id number] Billing Type: Third-Party Bill

## 2021-08-18 ENCOUNTER — TELEPHONE (OUTPATIENT)
Dept: WOUND CARE | Facility: CLINIC | Age: 76
End: 2021-08-18

## 2021-08-30 ENCOUNTER — PATIENT OUTREACH (OUTPATIENT)
Dept: ADMINISTRATIVE | Facility: OTHER | Age: 76
End: 2021-08-30

## 2021-09-17 ENCOUNTER — TELEPHONE (OUTPATIENT)
Dept: NEPHROLOGY | Facility: CLINIC | Age: 76
End: 2021-09-17

## 2021-09-22 ENCOUNTER — OFFICE VISIT (OUTPATIENT)
Dept: WOUND CARE | Facility: CLINIC | Age: 76
End: 2021-09-22
Payer: MEDICARE

## 2021-09-22 VITALS
HEIGHT: 67 IN | DIASTOLIC BLOOD PRESSURE: 83 MMHG | WEIGHT: 257 LBS | HEART RATE: 83 BPM | BODY MASS INDEX: 40.34 KG/M2 | TEMPERATURE: 97 F | SYSTOLIC BLOOD PRESSURE: 152 MMHG

## 2021-09-22 DIAGNOSIS — L97.919 VENOUS ULCER OF BOTH LOWER EXTREMITIES WITH VARICOSE VEINS: ICD-10-CM

## 2021-09-22 DIAGNOSIS — L97.929 VENOUS ULCER OF BOTH LOWER EXTREMITIES WITH VARICOSE VEINS: ICD-10-CM

## 2021-09-22 DIAGNOSIS — I83.029 VENOUS ULCER OF BOTH LOWER EXTREMITIES WITH VARICOSE VEINS: ICD-10-CM

## 2021-09-22 DIAGNOSIS — I83.019 VENOUS ULCER OF BOTH LOWER EXTREMITIES WITH VARICOSE VEINS: ICD-10-CM

## 2021-09-22 PROCEDURE — 3077F PR MOST RECENT SYSTOLIC BLOOD PRESSURE >= 140 MM HG: ICD-10-PCS | Mod: HCNC,CPTII,S$GLB, | Performed by: NURSE PRACTITIONER

## 2021-09-22 PROCEDURE — 1159F PR MEDICATION LIST DOCUMENTED IN MEDICAL RECORD: ICD-10-PCS | Mod: HCNC,CPTII,S$GLB, | Performed by: NURSE PRACTITIONER

## 2021-09-22 PROCEDURE — 3077F SYST BP >= 140 MM HG: CPT | Mod: HCNC,CPTII,S$GLB, | Performed by: NURSE PRACTITIONER

## 2021-09-22 PROCEDURE — 99999 PR PBB SHADOW E&M-EST. PATIENT-LVL V: ICD-10-PCS | Mod: PBBFAC,HCNC,, | Performed by: NURSE PRACTITIONER

## 2021-09-22 PROCEDURE — 3079F DIAST BP 80-89 MM HG: CPT | Mod: HCNC,CPTII,S$GLB, | Performed by: NURSE PRACTITIONER

## 2021-09-22 PROCEDURE — 29580 STRAPPING UNNA BOOT: CPT | Mod: 50,HCNC,S$GLB, | Performed by: NURSE PRACTITIONER

## 2021-09-22 PROCEDURE — 29580 PR STRAPPING UNNA BOOT: ICD-10-PCS | Mod: 50,HCNC,S$GLB, | Performed by: NURSE PRACTITIONER

## 2021-09-22 PROCEDURE — 1126F AMNT PAIN NOTED NONE PRSNT: CPT | Mod: HCNC,CPTII,S$GLB, | Performed by: NURSE PRACTITIONER

## 2021-09-22 PROCEDURE — 1126F PR PAIN SEVERITY QUANTIFIED, NO PAIN PRESENT: ICD-10-PCS | Mod: HCNC,CPTII,S$GLB, | Performed by: NURSE PRACTITIONER

## 2021-09-22 PROCEDURE — 3079F PR MOST RECENT DIASTOLIC BLOOD PRESSURE 80-89 MM HG: ICD-10-PCS | Mod: HCNC,CPTII,S$GLB, | Performed by: NURSE PRACTITIONER

## 2021-09-22 PROCEDURE — 99213 OFFICE O/P EST LOW 20 MIN: CPT | Mod: 25,HCNC,S$GLB, | Performed by: NURSE PRACTITIONER

## 2021-09-22 PROCEDURE — 99213 PR OFFICE/OUTPT VISIT, EST, LEVL III, 20-29 MIN: ICD-10-PCS | Mod: 25,HCNC,S$GLB, | Performed by: NURSE PRACTITIONER

## 2021-09-22 PROCEDURE — 99999 PR PBB SHADOW E&M-EST. PATIENT-LVL V: CPT | Mod: PBBFAC,HCNC,, | Performed by: NURSE PRACTITIONER

## 2021-09-22 PROCEDURE — 1159F MED LIST DOCD IN RCRD: CPT | Mod: HCNC,CPTII,S$GLB, | Performed by: NURSE PRACTITIONER

## 2021-09-29 ENCOUNTER — OFFICE VISIT (OUTPATIENT)
Dept: WOUND CARE | Facility: CLINIC | Age: 76
End: 2021-09-29
Payer: MEDICARE

## 2021-09-29 VITALS
BODY MASS INDEX: 40.34 KG/M2 | DIASTOLIC BLOOD PRESSURE: 86 MMHG | WEIGHT: 257 LBS | HEIGHT: 67 IN | HEART RATE: 78 BPM | SYSTOLIC BLOOD PRESSURE: 181 MMHG

## 2021-09-29 DIAGNOSIS — I89.0 LYMPHEDEMA: ICD-10-CM

## 2021-09-29 DIAGNOSIS — L97.919 VENOUS ULCER OF BOTH LOWER EXTREMITIES WITH VARICOSE VEINS: Primary | ICD-10-CM

## 2021-09-29 DIAGNOSIS — I83.019 VENOUS ULCER OF BOTH LOWER EXTREMITIES WITH VARICOSE VEINS: Primary | ICD-10-CM

## 2021-09-29 DIAGNOSIS — I83.029 VENOUS ULCER OF BOTH LOWER EXTREMITIES WITH VARICOSE VEINS: Primary | ICD-10-CM

## 2021-09-29 DIAGNOSIS — L97.929 VENOUS ULCER OF BOTH LOWER EXTREMITIES WITH VARICOSE VEINS: Primary | ICD-10-CM

## 2021-09-29 PROCEDURE — 99999 PR PBB SHADOW E&M-EST. PATIENT-LVL V: CPT | Mod: PBBFAC,HCNC,, | Performed by: NURSE PRACTITIONER

## 2021-09-29 PROCEDURE — 99999 PR PBB SHADOW E&M-EST. PATIENT-LVL V: ICD-10-PCS | Mod: PBBFAC,HCNC,, | Performed by: NURSE PRACTITIONER

## 2021-09-29 PROCEDURE — 1126F PR PAIN SEVERITY QUANTIFIED, NO PAIN PRESENT: ICD-10-PCS | Mod: HCNC,CPTII,S$GLB, | Performed by: NURSE PRACTITIONER

## 2021-09-29 PROCEDURE — 1126F AMNT PAIN NOTED NONE PRSNT: CPT | Mod: HCNC,CPTII,S$GLB, | Performed by: NURSE PRACTITIONER

## 2021-09-29 PROCEDURE — 1160F RVW MEDS BY RX/DR IN RCRD: CPT | Mod: HCNC,CPTII,S$GLB, | Performed by: NURSE PRACTITIONER

## 2021-09-29 PROCEDURE — 3077F PR MOST RECENT SYSTOLIC BLOOD PRESSURE >= 140 MM HG: ICD-10-PCS | Mod: HCNC,CPTII,S$GLB, | Performed by: NURSE PRACTITIONER

## 2021-09-29 PROCEDURE — 99499 UNLISTED E&M SERVICE: CPT | Mod: HCNC,S$GLB,, | Performed by: NURSE PRACTITIONER

## 2021-09-29 PROCEDURE — 29580 PR STRAPPING UNNA BOOT: ICD-10-PCS | Mod: 50,HCNC,S$GLB, | Performed by: NURSE PRACTITIONER

## 2021-09-29 PROCEDURE — 3077F SYST BP >= 140 MM HG: CPT | Mod: HCNC,CPTII,S$GLB, | Performed by: NURSE PRACTITIONER

## 2021-09-29 PROCEDURE — 99499 NO LOS: ICD-10-PCS | Mod: HCNC,S$GLB,, | Performed by: NURSE PRACTITIONER

## 2021-09-29 PROCEDURE — 1160F PR REVIEW ALL MEDS BY PRESCRIBER/CLIN PHARMACIST DOCUMENTED: ICD-10-PCS | Mod: HCNC,CPTII,S$GLB, | Performed by: NURSE PRACTITIONER

## 2021-09-29 PROCEDURE — 1159F MED LIST DOCD IN RCRD: CPT | Mod: HCNC,CPTII,S$GLB, | Performed by: NURSE PRACTITIONER

## 2021-09-29 PROCEDURE — 3079F DIAST BP 80-89 MM HG: CPT | Mod: HCNC,CPTII,S$GLB, | Performed by: NURSE PRACTITIONER

## 2021-09-29 PROCEDURE — 29580 STRAPPING UNNA BOOT: CPT | Mod: 50,HCNC,S$GLB, | Performed by: NURSE PRACTITIONER

## 2021-09-29 PROCEDURE — 3079F PR MOST RECENT DIASTOLIC BLOOD PRESSURE 80-89 MM HG: ICD-10-PCS | Mod: HCNC,CPTII,S$GLB, | Performed by: NURSE PRACTITIONER

## 2021-09-29 PROCEDURE — 1159F PR MEDICATION LIST DOCUMENTED IN MEDICAL RECORD: ICD-10-PCS | Mod: HCNC,CPTII,S$GLB, | Performed by: NURSE PRACTITIONER

## 2021-10-03 ENCOUNTER — TELEPHONE (OUTPATIENT)
Dept: INTERNAL MEDICINE | Facility: CLINIC | Age: 76
End: 2021-10-03

## 2021-10-03 DIAGNOSIS — I83.019 VENOUS ULCER-LEG SYNDROME, BILATERAL: Primary | ICD-10-CM

## 2021-10-03 DIAGNOSIS — L97.919 VENOUS ULCER-LEG SYNDROME, BILATERAL: Primary | ICD-10-CM

## 2021-10-03 DIAGNOSIS — L97.929 VENOUS ULCER-LEG SYNDROME, BILATERAL: Primary | ICD-10-CM

## 2021-10-03 DIAGNOSIS — I83.029 VENOUS ULCER-LEG SYNDROME, BILATERAL: Primary | ICD-10-CM

## 2021-10-05 PROCEDURE — G0180 MD CERTIFICATION HHA PATIENT: HCPCS | Mod: ,,, | Performed by: INTERNAL MEDICINE

## 2021-10-05 PROCEDURE — G0180 PR HOME HEALTH MD CERTIFICATION: ICD-10-PCS | Mod: ,,, | Performed by: INTERNAL MEDICINE

## 2021-10-06 ENCOUNTER — PATIENT MESSAGE (OUTPATIENT)
Dept: INTERNAL MEDICINE | Facility: CLINIC | Age: 76
End: 2021-10-06

## 2021-10-06 RX ORDER — GENTAMICIN SULFATE 3 MG/ML
1 SOLUTION/ DROPS OPHTHALMIC 3 TIMES DAILY
Qty: 15 ML | Refills: 0 | Status: SHIPPED | OUTPATIENT
Start: 2021-10-06 | End: 2022-06-06

## 2021-10-12 ENCOUNTER — EXTERNAL HOME HEALTH (OUTPATIENT)
Dept: HOME HEALTH SERVICES | Facility: HOSPITAL | Age: 76
End: 2021-10-12
Payer: MEDICARE

## 2021-10-19 ENCOUNTER — TELEPHONE (OUTPATIENT)
Dept: INTERNAL MEDICINE | Facility: CLINIC | Age: 76
End: 2021-10-19

## 2021-10-25 ENCOUNTER — DOCUMENT SCAN (OUTPATIENT)
Dept: HOME HEALTH SERVICES | Facility: HOSPITAL | Age: 76
End: 2021-10-25
Payer: MEDICARE

## 2021-10-27 ENCOUNTER — OFFICE VISIT (OUTPATIENT)
Dept: WOUND CARE | Facility: CLINIC | Age: 76
End: 2021-10-27
Payer: MEDICARE

## 2021-10-27 VITALS
DIASTOLIC BLOOD PRESSURE: 87 MMHG | SYSTOLIC BLOOD PRESSURE: 174 MMHG | HEART RATE: 79 BPM | TEMPERATURE: 98 F | HEIGHT: 67 IN | WEIGHT: 257 LBS | BODY MASS INDEX: 40.34 KG/M2

## 2021-10-27 DIAGNOSIS — I83.019 VENOUS ULCER OF BOTH LOWER EXTREMITIES WITH VARICOSE VEINS: Primary | ICD-10-CM

## 2021-10-27 DIAGNOSIS — L97.521 ULCER OF GREAT TOE, LEFT, LIMITED TO BREAKDOWN OF SKIN: ICD-10-CM

## 2021-10-27 DIAGNOSIS — L97.919 VENOUS ULCER OF BOTH LOWER EXTREMITIES WITH VARICOSE VEINS: Primary | ICD-10-CM

## 2021-10-27 DIAGNOSIS — I89.0 LYMPHEDEMA: ICD-10-CM

## 2021-10-27 DIAGNOSIS — L97.929 VENOUS ULCER OF BOTH LOWER EXTREMITIES WITH VARICOSE VEINS: Primary | ICD-10-CM

## 2021-10-27 DIAGNOSIS — I83.029 VENOUS ULCER OF BOTH LOWER EXTREMITIES WITH VARICOSE VEINS: Primary | ICD-10-CM

## 2021-10-27 PROCEDURE — 3077F SYST BP >= 140 MM HG: CPT | Mod: HCNC,CPTII,S$GLB, | Performed by: NURSE PRACTITIONER

## 2021-10-27 PROCEDURE — 3079F DIAST BP 80-89 MM HG: CPT | Mod: HCNC,CPTII,S$GLB, | Performed by: NURSE PRACTITIONER

## 2021-10-27 PROCEDURE — 29580 STRAPPING UNNA BOOT: CPT | Mod: 50,HCNC,S$GLB, | Performed by: NURSE PRACTITIONER

## 2021-10-27 PROCEDURE — 1126F PR PAIN SEVERITY QUANTIFIED, NO PAIN PRESENT: ICD-10-PCS | Mod: HCNC,CPTII,S$GLB, | Performed by: NURSE PRACTITIONER

## 2021-10-27 PROCEDURE — 1160F RVW MEDS BY RX/DR IN RCRD: CPT | Mod: HCNC,CPTII,S$GLB, | Performed by: NURSE PRACTITIONER

## 2021-10-27 PROCEDURE — 3077F PR MOST RECENT SYSTOLIC BLOOD PRESSURE >= 140 MM HG: ICD-10-PCS | Mod: HCNC,CPTII,S$GLB, | Performed by: NURSE PRACTITIONER

## 2021-10-27 PROCEDURE — 1159F PR MEDICATION LIST DOCUMENTED IN MEDICAL RECORD: ICD-10-PCS | Mod: HCNC,CPTII,S$GLB, | Performed by: NURSE PRACTITIONER

## 2021-10-27 PROCEDURE — 99999 PR PBB SHADOW E&M-EST. PATIENT-LVL V: ICD-10-PCS | Mod: PBBFAC,HCNC,, | Performed by: NURSE PRACTITIONER

## 2021-10-27 PROCEDURE — 1126F AMNT PAIN NOTED NONE PRSNT: CPT | Mod: HCNC,CPTII,S$GLB, | Performed by: NURSE PRACTITIONER

## 2021-10-27 PROCEDURE — 1160F PR REVIEW ALL MEDS BY PRESCRIBER/CLIN PHARMACIST DOCUMENTED: ICD-10-PCS | Mod: HCNC,CPTII,S$GLB, | Performed by: NURSE PRACTITIONER

## 2021-10-27 PROCEDURE — 99499 UNLISTED E&M SERVICE: CPT | Mod: HCNC,S$GLB,, | Performed by: NURSE PRACTITIONER

## 2021-10-27 PROCEDURE — 99999 PR PBB SHADOW E&M-EST. PATIENT-LVL V: CPT | Mod: PBBFAC,HCNC,, | Performed by: NURSE PRACTITIONER

## 2021-10-27 PROCEDURE — 99499 NO LOS: ICD-10-PCS | Mod: HCNC,S$GLB,, | Performed by: NURSE PRACTITIONER

## 2021-10-27 PROCEDURE — 1159F MED LIST DOCD IN RCRD: CPT | Mod: HCNC,CPTII,S$GLB, | Performed by: NURSE PRACTITIONER

## 2021-10-27 PROCEDURE — 3079F PR MOST RECENT DIASTOLIC BLOOD PRESSURE 80-89 MM HG: ICD-10-PCS | Mod: HCNC,CPTII,S$GLB, | Performed by: NURSE PRACTITIONER

## 2021-10-27 PROCEDURE — 29580 PR STRAPPING UNNA BOOT: ICD-10-PCS | Mod: 50,HCNC,S$GLB, | Performed by: NURSE PRACTITIONER

## 2021-11-01 ENCOUNTER — DOCUMENT SCAN (OUTPATIENT)
Dept: HOME HEALTH SERVICES | Facility: HOSPITAL | Age: 76
End: 2021-11-01
Payer: MEDICARE

## 2021-11-10 ENCOUNTER — DOCUMENT SCAN (OUTPATIENT)
Dept: HOME HEALTH SERVICES | Facility: HOSPITAL | Age: 76
End: 2021-11-10
Payer: MEDICARE

## 2021-11-10 ENCOUNTER — PATIENT MESSAGE (OUTPATIENT)
Dept: NEPHROLOGY | Facility: CLINIC | Age: 76
End: 2021-11-10
Payer: MEDICARE

## 2021-11-11 DIAGNOSIS — N18.32 STAGE 3B CHRONIC KIDNEY DISEASE: Primary | ICD-10-CM

## 2021-11-15 ENCOUNTER — OFFICE VISIT (OUTPATIENT)
Dept: NEPHROLOGY | Facility: CLINIC | Age: 76
End: 2021-11-15
Payer: MEDICARE

## 2021-11-15 VITALS
HEART RATE: 81 BPM | SYSTOLIC BLOOD PRESSURE: 160 MMHG | WEIGHT: 257.06 LBS | DIASTOLIC BLOOD PRESSURE: 60 MMHG | HEIGHT: 67 IN | BODY MASS INDEX: 40.35 KG/M2 | OXYGEN SATURATION: 100 %

## 2021-11-15 DIAGNOSIS — N18.32 STAGE 3B CHRONIC KIDNEY DISEASE: Primary | ICD-10-CM

## 2021-11-15 DIAGNOSIS — N39.0 FREQUENT UTI: ICD-10-CM

## 2021-11-15 PROCEDURE — 1101F PR PT FALLS ASSESS DOC 0-1 FALLS W/OUT INJ PAST YR: ICD-10-PCS | Mod: HCNC,CPTII,S$GLB, | Performed by: NURSE PRACTITIONER

## 2021-11-15 PROCEDURE — 99999 PR PBB SHADOW E&M-EST. PATIENT-LVL V: CPT | Mod: PBBFAC,HCNC,, | Performed by: NURSE PRACTITIONER

## 2021-11-15 PROCEDURE — 1160F PR REVIEW ALL MEDS BY PRESCRIBER/CLIN PHARMACIST DOCUMENTED: ICD-10-PCS | Mod: HCNC,CPTII,S$GLB, | Performed by: NURSE PRACTITIONER

## 2021-11-15 PROCEDURE — 99214 OFFICE O/P EST MOD 30 MIN: CPT | Mod: HCNC,S$GLB,, | Performed by: NURSE PRACTITIONER

## 2021-11-15 PROCEDURE — 99499 UNLISTED E&M SERVICE: CPT | Mod: HCNC,S$GLB,, | Performed by: NURSE PRACTITIONER

## 2021-11-15 PROCEDURE — 99999 PR PBB SHADOW E&M-EST. PATIENT-LVL V: ICD-10-PCS | Mod: PBBFAC,HCNC,, | Performed by: NURSE PRACTITIONER

## 2021-11-15 PROCEDURE — 1126F PR PAIN SEVERITY QUANTIFIED, NO PAIN PRESENT: ICD-10-PCS | Mod: HCNC,CPTII,S$GLB, | Performed by: NURSE PRACTITIONER

## 2021-11-15 PROCEDURE — 3077F SYST BP >= 140 MM HG: CPT | Mod: HCNC,CPTII,S$GLB, | Performed by: NURSE PRACTITIONER

## 2021-11-15 PROCEDURE — 3078F DIAST BP <80 MM HG: CPT | Mod: HCNC,CPTII,S$GLB, | Performed by: NURSE PRACTITIONER

## 2021-11-15 PROCEDURE — 1126F AMNT PAIN NOTED NONE PRSNT: CPT | Mod: HCNC,CPTII,S$GLB, | Performed by: NURSE PRACTITIONER

## 2021-11-15 PROCEDURE — 99214 PR OFFICE/OUTPT VISIT, EST, LEVL IV, 30-39 MIN: ICD-10-PCS | Mod: HCNC,S$GLB,, | Performed by: NURSE PRACTITIONER

## 2021-11-15 PROCEDURE — 3288F PR FALLS RISK ASSESSMENT DOCUMENTED: ICD-10-PCS | Mod: HCNC,CPTII,S$GLB, | Performed by: NURSE PRACTITIONER

## 2021-11-15 PROCEDURE — 1101F PT FALLS ASSESS-DOCD LE1/YR: CPT | Mod: HCNC,CPTII,S$GLB, | Performed by: NURSE PRACTITIONER

## 2021-11-15 PROCEDURE — 99499 RISK ADDL DX/OHS AUDIT: ICD-10-PCS | Mod: HCNC,S$GLB,, | Performed by: NURSE PRACTITIONER

## 2021-11-15 PROCEDURE — 3288F FALL RISK ASSESSMENT DOCD: CPT | Mod: HCNC,CPTII,S$GLB, | Performed by: NURSE PRACTITIONER

## 2021-11-15 PROCEDURE — 1160F RVW MEDS BY RX/DR IN RCRD: CPT | Mod: HCNC,CPTII,S$GLB, | Performed by: NURSE PRACTITIONER

## 2021-11-15 PROCEDURE — 3077F PR MOST RECENT SYSTOLIC BLOOD PRESSURE >= 140 MM HG: ICD-10-PCS | Mod: HCNC,CPTII,S$GLB, | Performed by: NURSE PRACTITIONER

## 2021-11-15 PROCEDURE — 3078F PR MOST RECENT DIASTOLIC BLOOD PRESSURE < 80 MM HG: ICD-10-PCS | Mod: HCNC,CPTII,S$GLB, | Performed by: NURSE PRACTITIONER

## 2021-11-15 PROCEDURE — 1159F PR MEDICATION LIST DOCUMENTED IN MEDICAL RECORD: ICD-10-PCS | Mod: HCNC,CPTII,S$GLB, | Performed by: NURSE PRACTITIONER

## 2021-11-15 PROCEDURE — 1159F MED LIST DOCD IN RCRD: CPT | Mod: HCNC,CPTII,S$GLB, | Performed by: NURSE PRACTITIONER

## 2021-11-22 ENCOUNTER — LAB VISIT (OUTPATIENT)
Dept: LAB | Facility: HOSPITAL | Age: 76
End: 2021-11-22
Attending: NURSE PRACTITIONER
Payer: MEDICARE

## 2021-11-22 DIAGNOSIS — N18.32 STAGE 3B CHRONIC KIDNEY DISEASE: ICD-10-CM

## 2021-11-22 DIAGNOSIS — N18.32 STAGE 3B CHRONIC KIDNEY DISEASE: Primary | ICD-10-CM

## 2021-11-22 DIAGNOSIS — N39.0 FREQUENT UTI: ICD-10-CM

## 2021-11-22 LAB
BACTERIA #/AREA URNS AUTO: ABNORMAL /HPF
BILIRUB UR QL STRIP: NEGATIVE
CLARITY UR REFRACT.AUTO: ABNORMAL
COLOR UR AUTO: YELLOW
GLUCOSE UR QL STRIP: ABNORMAL
HGB UR QL STRIP: ABNORMAL
HYALINE CASTS UR QL AUTO: 0 /LPF
KETONES UR QL STRIP: NEGATIVE
LEUKOCYTE ESTERASE UR QL STRIP: ABNORMAL
MICROSCOPIC COMMENT: ABNORMAL
NITRITE UR QL STRIP: NEGATIVE
PH UR STRIP: 7 [PH] (ref 5–8)
PROT UR QL STRIP: ABNORMAL
RBC #/AREA URNS AUTO: 1 /HPF (ref 0–4)
SP GR UR STRIP: 1.01 (ref 1–1.03)
SQUAMOUS #/AREA URNS AUTO: 0 /HPF
URN SPEC COLLECT METH UR: ABNORMAL
WBC #/AREA URNS AUTO: 42 /HPF (ref 0–5)
WBC CLUMPS UR QL AUTO: ABNORMAL
YEAST UR QL AUTO: ABNORMAL

## 2021-11-22 PROCEDURE — 87086 URINE CULTURE/COLONY COUNT: CPT | Mod: HCNC | Performed by: NURSE PRACTITIONER

## 2021-11-22 PROCEDURE — 87186 SC STD MICRODIL/AGAR DIL: CPT | Mod: HCNC | Performed by: NURSE PRACTITIONER

## 2021-11-22 PROCEDURE — 82570 ASSAY OF URINE CREATININE: CPT | Mod: HCNC | Performed by: NURSE PRACTITIONER

## 2021-11-22 PROCEDURE — 87088 URINE BACTERIA CULTURE: CPT | Mod: HCNC | Performed by: NURSE PRACTITIONER

## 2021-11-22 PROCEDURE — 81001 URINALYSIS AUTO W/SCOPE: CPT | Mod: HCNC | Performed by: NURSE PRACTITIONER

## 2021-11-22 PROCEDURE — 87077 CULTURE AEROBIC IDENTIFY: CPT | Mod: HCNC | Performed by: NURSE PRACTITIONER

## 2021-11-22 RX ORDER — LANCETS 33 GAUGE
1 EACH MISCELLANEOUS 2 TIMES DAILY
Qty: 200 EACH | Refills: 0 | Status: ON HOLD | OUTPATIENT
Start: 2021-11-22 | End: 2022-07-22 | Stop reason: HOSPADM

## 2021-11-23 ENCOUNTER — PATIENT MESSAGE (OUTPATIENT)
Dept: NEPHROLOGY | Facility: CLINIC | Age: 76
End: 2021-11-23
Payer: MEDICARE

## 2021-11-23 LAB
CREAT UR-MCNC: 65 MG/DL (ref 15–325)
PROT UR-MCNC: 198 MG/DL (ref 0–15)
PROT/CREAT UR: 3.05 MG/G{CREAT} (ref 0–0.2)

## 2021-11-26 LAB — BACTERIA UR CULT: ABNORMAL

## 2021-11-29 ENCOUNTER — TELEPHONE (OUTPATIENT)
Dept: NEPHROLOGY | Facility: CLINIC | Age: 76
End: 2021-11-29
Payer: MEDICARE

## 2021-11-29 DIAGNOSIS — N39.0 RECURRENT UTI: Primary | ICD-10-CM

## 2021-11-29 DIAGNOSIS — R82.71 BACTERIURIA: ICD-10-CM

## 2021-11-29 RX ORDER — DOXYCYCLINE 100 MG/1
100 CAPSULE ORAL EVERY 12 HOURS
Qty: 20 CAPSULE | Refills: 0 | Status: SHIPPED | OUTPATIENT
Start: 2021-11-29 | End: 2021-12-09

## 2021-12-01 ENCOUNTER — PATIENT MESSAGE (OUTPATIENT)
Dept: NEPHROLOGY | Facility: CLINIC | Age: 76
End: 2021-12-01
Payer: MEDICARE

## 2021-12-02 ENCOUNTER — HOSPITAL ENCOUNTER (OUTPATIENT)
Dept: WOUND CARE | Facility: HOSPITAL | Age: 76
Discharge: HOME | End: 2021-12-02
Attending: SURGERY
Payer: MEDICARE

## 2021-12-02 VITALS
HEIGHT: 67 IN | BODY MASS INDEX: 40.34 KG/M2 | TEMPERATURE: 98 F | WEIGHT: 257 LBS | SYSTOLIC BLOOD PRESSURE: 203 MMHG | DIASTOLIC BLOOD PRESSURE: 86 MMHG | HEART RATE: 84 BPM

## 2021-12-02 DIAGNOSIS — I87.2 VENOUS INSUFFICIENCY OF BOTH LOWER EXTREMITIES: Primary | ICD-10-CM

## 2021-12-02 DIAGNOSIS — I87.2 VENOUS STASIS DERMATITIS OF BOTH LOWER EXTREMITIES: ICD-10-CM

## 2021-12-02 DIAGNOSIS — L97.521 ULCER OF GREAT TOE, LEFT, LIMITED TO BREAKDOWN OF SKIN: ICD-10-CM

## 2021-12-02 DIAGNOSIS — S81.802A OPEN WOUND OF BOTH LOWER EXTREMITIES, INITIAL ENCOUNTER: ICD-10-CM

## 2021-12-02 DIAGNOSIS — S81.801A OPEN WOUND OF BOTH LOWER EXTREMITIES, INITIAL ENCOUNTER: ICD-10-CM

## 2021-12-02 DIAGNOSIS — I89.0 LYMPHEDEMA: ICD-10-CM

## 2021-12-02 DIAGNOSIS — L97.929 VENOUS ULCER OF BOTH LOWER EXTREMITIES WITH VARICOSE VEINS: ICD-10-CM

## 2021-12-02 DIAGNOSIS — R60.0 BILATERAL LEG EDEMA: ICD-10-CM

## 2021-12-02 DIAGNOSIS — E11.42 TYPE 2 DIABETES MELLITUS WITH DIABETIC POLYNEUROPATHY, WITH LONG-TERM CURRENT USE OF INSULIN: ICD-10-CM

## 2021-12-02 DIAGNOSIS — Z99.3 WHEELCHAIR DEPENDENT: Chronic | ICD-10-CM

## 2021-12-02 DIAGNOSIS — Z79.4 TYPE 2 DIABETES MELLITUS WITH DIABETIC POLYNEUROPATHY, WITH LONG-TERM CURRENT USE OF INSULIN: ICD-10-CM

## 2021-12-02 DIAGNOSIS — I83.019 VENOUS ULCER OF BOTH LOWER EXTREMITIES WITH VARICOSE VEINS: ICD-10-CM

## 2021-12-02 DIAGNOSIS — L97.919 VENOUS ULCER OF BOTH LOWER EXTREMITIES WITH VARICOSE VEINS: ICD-10-CM

## 2021-12-02 DIAGNOSIS — I83.029 VENOUS ULCER OF BOTH LOWER EXTREMITIES WITH VARICOSE VEINS: ICD-10-CM

## 2021-12-02 DIAGNOSIS — E66.01 OBESITY, MORBID (MORE THAN 100 LBS OVER IDEAL WEIGHT OR BMI > 40): ICD-10-CM

## 2021-12-02 DIAGNOSIS — L97.522 SKIN ULCER OF LEFT GREAT TOE WITH FAT LAYER EXPOSED: ICD-10-CM

## 2021-12-02 DIAGNOSIS — I89.0 LYMPHEDEMA OF BOTH LOWER EXTREMITIES: ICD-10-CM

## 2021-12-02 PROCEDURE — 29581 APPL MULTLAYER CMPRN SYS LEG: CPT | Mod: 50

## 2021-12-02 PROCEDURE — 99215 OFFICE O/P EST HI 40 MIN: CPT | Mod: HCNC

## 2021-12-04 PROCEDURE — G0179 PR HOME HEALTH MD RECERTIFICATION: ICD-10-PCS | Mod: ,,, | Performed by: INTERNAL MEDICINE

## 2021-12-04 PROCEDURE — G0179 MD RECERTIFICATION HHA PT: HCPCS | Mod: ,,, | Performed by: INTERNAL MEDICINE

## 2021-12-06 ENCOUNTER — OFFICE VISIT (OUTPATIENT)
Dept: INTERNAL MEDICINE | Facility: CLINIC | Age: 76
End: 2021-12-06
Payer: MEDICARE

## 2021-12-06 ENCOUNTER — DOCUMENT SCAN (OUTPATIENT)
Dept: HOME HEALTH SERVICES | Facility: HOSPITAL | Age: 76
End: 2021-12-06
Payer: MEDICARE

## 2021-12-06 ENCOUNTER — LAB VISIT (OUTPATIENT)
Dept: LAB | Facility: HOSPITAL | Age: 76
End: 2021-12-06
Payer: MEDICARE

## 2021-12-06 VITALS
OXYGEN SATURATION: 99 % | HEART RATE: 72 BPM | BODY MASS INDEX: 41.48 KG/M2 | SYSTOLIC BLOOD PRESSURE: 152 MMHG | HEIGHT: 66 IN | DIASTOLIC BLOOD PRESSURE: 88 MMHG

## 2021-12-06 DIAGNOSIS — E11.42 TYPE 2 DIABETES MELLITUS WITH DIABETIC POLYNEUROPATHY, WITH LONG-TERM CURRENT USE OF INSULIN: ICD-10-CM

## 2021-12-06 DIAGNOSIS — E21.3 HYPERPARATHYROIDISM: ICD-10-CM

## 2021-12-06 DIAGNOSIS — I13.0 BENIGN HYPERTENSIVE HEART AND KIDNEY DISEASE WITH HF AND CKD: ICD-10-CM

## 2021-12-06 DIAGNOSIS — L97.929 VENOUS ULCER OF BOTH LOWER EXTREMITIES WITH VARICOSE VEINS: ICD-10-CM

## 2021-12-06 DIAGNOSIS — I77.9 PAOD (PERIPHERAL ARTERIAL OCCLUSIVE DISEASE): ICD-10-CM

## 2021-12-06 DIAGNOSIS — R53.83 FATIGUE, UNSPECIFIED TYPE: ICD-10-CM

## 2021-12-06 DIAGNOSIS — L97.919 VENOUS ULCER OF BOTH LOWER EXTREMITIES WITH VARICOSE VEINS: ICD-10-CM

## 2021-12-06 DIAGNOSIS — Z79.4 TYPE 2 DIABETES MELLITUS WITH DIABETIC POLYNEUROPATHY, WITH LONG-TERM CURRENT USE OF INSULIN: ICD-10-CM

## 2021-12-06 DIAGNOSIS — M85.80 OSTEOPENIA, UNSPECIFIED LOCATION: ICD-10-CM

## 2021-12-06 DIAGNOSIS — I10 ESSENTIAL HYPERTENSION: ICD-10-CM

## 2021-12-06 DIAGNOSIS — R82.71 ASYMPTOMATIC BACTERIURIA: ICD-10-CM

## 2021-12-06 DIAGNOSIS — N18.4 STAGE 4 CHRONIC KIDNEY DISEASE: ICD-10-CM

## 2021-12-06 DIAGNOSIS — I83.019 VENOUS ULCER OF BOTH LOWER EXTREMITIES WITH VARICOSE VEINS: ICD-10-CM

## 2021-12-06 DIAGNOSIS — I83.029 VENOUS ULCER OF BOTH LOWER EXTREMITIES WITH VARICOSE VEINS: ICD-10-CM

## 2021-12-06 DIAGNOSIS — Z99.3 WHEELCHAIR DEPENDENT: ICD-10-CM

## 2021-12-06 DIAGNOSIS — D63.8 ANEMIA OF CHRONIC DISEASE: ICD-10-CM

## 2021-12-06 DIAGNOSIS — Z00.00 VISIT FOR ANNUAL HEALTH EXAMINATION: Primary | ICD-10-CM

## 2021-12-06 PROBLEM — I16.1 HYPERTENSIVE EMERGENCY: Status: RESOLVED | Noted: 2021-05-17 | Resolved: 2021-12-06

## 2021-12-06 LAB
ESTIMATED AVG GLUCOSE: 194 MG/DL (ref 68–131)
HBA1C MFR BLD: 8.4 % (ref 4–5.6)
T4 FREE SERPL-MCNC: 1.01 NG/DL (ref 0.71–1.51)
TSH SERPL DL<=0.005 MIU/L-ACNC: 2.49 UIU/ML (ref 0.4–4)

## 2021-12-06 PROCEDURE — 83036 HEMOGLOBIN GLYCOSYLATED A1C: CPT | Mod: HCNC | Performed by: INTERNAL MEDICINE

## 2021-12-06 PROCEDURE — 84439 ASSAY OF FREE THYROXINE: CPT | Mod: HCNC | Performed by: INTERNAL MEDICINE

## 2021-12-06 PROCEDURE — 36415 COLL VENOUS BLD VENIPUNCTURE: CPT | Mod: HCNC | Performed by: INTERNAL MEDICINE

## 2021-12-06 PROCEDURE — 99397 PR PREVENTIVE VISIT,EST,65 & OVER: ICD-10-PCS | Mod: HCNC,S$GLB,, | Performed by: INTERNAL MEDICINE

## 2021-12-06 PROCEDURE — 84443 ASSAY THYROID STIM HORMONE: CPT | Mod: HCNC | Performed by: INTERNAL MEDICINE

## 2021-12-06 PROCEDURE — 99999 PR PBB SHADOW E&M-EST. PATIENT-LVL V: ICD-10-PCS | Mod: PBBFAC,HCNC,, | Performed by: INTERNAL MEDICINE

## 2021-12-06 PROCEDURE — 99397 PER PM REEVAL EST PAT 65+ YR: CPT | Mod: HCNC,S$GLB,, | Performed by: INTERNAL MEDICINE

## 2021-12-06 PROCEDURE — 99999 PR PBB SHADOW E&M-EST. PATIENT-LVL V: CPT | Mod: PBBFAC,HCNC,, | Performed by: INTERNAL MEDICINE

## 2021-12-16 ENCOUNTER — EXTERNAL HOME HEALTH (OUTPATIENT)
Dept: HOME HEALTH SERVICES | Facility: HOSPITAL | Age: 76
End: 2021-12-16
Payer: MEDICARE

## 2021-12-21 ENCOUNTER — PATIENT MESSAGE (OUTPATIENT)
Dept: NEPHROLOGY | Facility: CLINIC | Age: 76
End: 2021-12-21
Payer: MEDICARE

## 2021-12-21 ENCOUNTER — PATIENT MESSAGE (OUTPATIENT)
Dept: INTERNAL MEDICINE | Facility: CLINIC | Age: 76
End: 2021-12-21
Payer: MEDICARE

## 2021-12-23 ENCOUNTER — HOSPITAL ENCOUNTER (OUTPATIENT)
Dept: RADIOLOGY | Facility: HOSPITAL | Age: 76
Discharge: HOME | End: 2021-12-23
Attending: SURGERY
Payer: MEDICARE

## 2021-12-23 ENCOUNTER — HOSPITAL ENCOUNTER (OUTPATIENT)
Dept: WOUND CARE | Facility: HOSPITAL | Age: 76
Discharge: HOME | End: 2021-12-23
Attending: SURGERY
Payer: MEDICARE

## 2021-12-23 VITALS
HEART RATE: 72 BPM | HEIGHT: 66 IN | SYSTOLIC BLOOD PRESSURE: 154 MMHG | BODY MASS INDEX: 41.3 KG/M2 | DIASTOLIC BLOOD PRESSURE: 65 MMHG | WEIGHT: 257 LBS | TEMPERATURE: 98 F

## 2021-12-23 DIAGNOSIS — R60.0 BILATERAL LEG EDEMA: ICD-10-CM

## 2021-12-23 DIAGNOSIS — I87.2 VENOUS REFLUX: Primary | ICD-10-CM

## 2021-12-23 DIAGNOSIS — E66.01 OBESITY, MORBID (MORE THAN 100 LBS OVER IDEAL WEIGHT OR BMI > 40): ICD-10-CM

## 2021-12-23 DIAGNOSIS — I87.2 VENOUS INSUFFICIENCY OF BOTH LOWER EXTREMITIES: ICD-10-CM

## 2021-12-23 DIAGNOSIS — I87.2 VENOUS STASIS DERMATITIS OF BOTH LOWER EXTREMITIES: ICD-10-CM

## 2021-12-23 DIAGNOSIS — I89.0 LYMPHEDEMA OF BOTH LOWER EXTREMITIES: ICD-10-CM

## 2021-12-23 DIAGNOSIS — L97.919 VENOUS ULCER OF BOTH LOWER EXTREMITIES WITH VARICOSE VEINS: ICD-10-CM

## 2021-12-23 DIAGNOSIS — S81.802A OPEN WOUND OF BOTH LOWER EXTREMITIES, INITIAL ENCOUNTER: ICD-10-CM

## 2021-12-23 DIAGNOSIS — I83.029 VENOUS ULCER OF BOTH LOWER EXTREMITIES WITH VARICOSE VEINS: ICD-10-CM

## 2021-12-23 DIAGNOSIS — L97.522 SKIN ULCER OF LEFT GREAT TOE WITH FAT LAYER EXPOSED: ICD-10-CM

## 2021-12-23 DIAGNOSIS — S81.801A OPEN WOUND OF BOTH LOWER EXTREMITIES, INITIAL ENCOUNTER: ICD-10-CM

## 2021-12-23 DIAGNOSIS — I83.019 VENOUS ULCER OF BOTH LOWER EXTREMITIES WITH VARICOSE VEINS: ICD-10-CM

## 2021-12-23 DIAGNOSIS — L97.929 VENOUS ULCER OF BOTH LOWER EXTREMITIES WITH VARICOSE VEINS: ICD-10-CM

## 2021-12-23 PROCEDURE — 93970 EXTREMITY STUDY: CPT | Mod: 26,HCNC,, | Performed by: RADIOLOGY

## 2021-12-23 PROCEDURE — 29581 APPL MULTLAYER CMPRN SYS LEG: CPT | Mod: HCNC

## 2021-12-23 PROCEDURE — 93970 US LOWER EXTREMITY VEINS BILATERAL INSUFFICIENCY: ICD-10-PCS | Mod: 26,HCNC,, | Performed by: RADIOLOGY

## 2021-12-23 PROCEDURE — 93970 EXTREMITY STUDY: CPT | Mod: TC,HCNC

## 2021-12-27 ENCOUNTER — TELEPHONE (OUTPATIENT)
Dept: WOUND CARE | Facility: HOSPITAL | Age: 76
End: 2021-12-27
Payer: MEDICARE

## 2022-01-07 ENCOUNTER — TELEPHONE (OUTPATIENT)
Dept: NEPHROLOGY | Facility: CLINIC | Age: 77
End: 2022-01-07
Payer: MEDICARE

## 2022-01-10 ENCOUNTER — PATIENT MESSAGE (OUTPATIENT)
Dept: INTERNAL MEDICINE | Facility: CLINIC | Age: 77
End: 2022-01-10
Payer: MEDICARE

## 2022-01-11 ENCOUNTER — PATIENT MESSAGE (OUTPATIENT)
Dept: INTERNAL MEDICINE | Facility: CLINIC | Age: 77
End: 2022-01-11
Payer: MEDICARE

## 2022-01-11 DIAGNOSIS — R30.0 DYSURIA: Primary | ICD-10-CM

## 2022-01-12 ENCOUNTER — LAB VISIT (OUTPATIENT)
Dept: LAB | Facility: HOSPITAL | Age: 77
End: 2022-01-12
Attending: INTERNAL MEDICINE
Payer: MEDICARE

## 2022-01-12 ENCOUNTER — TELEPHONE (OUTPATIENT)
Dept: NEPHROLOGY | Facility: CLINIC | Age: 77
End: 2022-01-12
Payer: MEDICARE

## 2022-01-12 DIAGNOSIS — R30.0 DYSURIA: ICD-10-CM

## 2022-01-12 LAB
BACTERIA #/AREA URNS AUTO: ABNORMAL /HPF
BILIRUB UR QL STRIP: NEGATIVE
CLARITY UR REFRACT.AUTO: ABNORMAL
COLOR UR AUTO: YELLOW
GLUCOSE UR QL STRIP: ABNORMAL
HGB UR QL STRIP: ABNORMAL
HYALINE CASTS UR QL AUTO: 0 /LPF
KETONES UR QL STRIP: NEGATIVE
LEUKOCYTE ESTERASE UR QL STRIP: ABNORMAL
MICROSCOPIC COMMENT: ABNORMAL
NITRITE UR QL STRIP: POSITIVE
NON-SQ EPI CELLS #/AREA URNS AUTO: 6 /HPF
PH UR STRIP: >8 [PH] (ref 5–8)
PROT UR QL STRIP: ABNORMAL
RBC #/AREA URNS AUTO: 29 /HPF (ref 0–4)
SP GR UR STRIP: 1.01 (ref 1–1.03)
SQUAMOUS #/AREA URNS AUTO: 12 /HPF
TRI-PHOS CRY UR QL COMP ASSIST: ABNORMAL
URN SPEC COLLECT METH UR: ABNORMAL
WBC #/AREA URNS AUTO: >100 /HPF (ref 0–5)
WBC CLUMPS UR QL AUTO: ABNORMAL

## 2022-01-12 PROCEDURE — 87086 URINE CULTURE/COLONY COUNT: CPT | Mod: HCNC | Performed by: INTERNAL MEDICINE

## 2022-01-12 PROCEDURE — 87077 CULTURE AEROBIC IDENTIFY: CPT | Mod: HCNC | Performed by: INTERNAL MEDICINE

## 2022-01-12 PROCEDURE — 87186 SC STD MICRODIL/AGAR DIL: CPT | Mod: HCNC | Performed by: INTERNAL MEDICINE

## 2022-01-12 PROCEDURE — 81001 URINALYSIS AUTO W/SCOPE: CPT | Mod: HCNC | Performed by: INTERNAL MEDICINE

## 2022-01-12 PROCEDURE — 87088 URINE BACTERIA CULTURE: CPT | Mod: HCNC | Performed by: INTERNAL MEDICINE

## 2022-01-12 NOTE — TELEPHONE ENCOUNTER
----- Message from Lauren Pedroza sent at 1/12/2022 12:58 PM CST -----  Regarding: appt  Contact: pt @ [phone]  Pt calling to scheduled 3 month f/u appt with Dr. Murray, no available appts in epic. Please call.

## 2022-01-13 ENCOUNTER — HOSPITAL ENCOUNTER (OUTPATIENT)
Dept: WOUND CARE | Facility: HOSPITAL | Age: 77
Discharge: HOME | End: 2022-01-13
Attending: SURGERY
Payer: MEDICARE

## 2022-01-13 VITALS
BODY MASS INDEX: 41.3 KG/M2 | SYSTOLIC BLOOD PRESSURE: 176 MMHG | DIASTOLIC BLOOD PRESSURE: 77 MMHG | HEIGHT: 66 IN | TEMPERATURE: 98 F | HEART RATE: 79 BPM | WEIGHT: 257 LBS

## 2022-01-13 DIAGNOSIS — Z79.4 TYPE 2 DIABETES MELLITUS WITH DIABETIC POLYNEUROPATHY, WITH LONG-TERM CURRENT USE OF INSULIN: ICD-10-CM

## 2022-01-13 DIAGNOSIS — R60.0 BILATERAL LEG EDEMA: ICD-10-CM

## 2022-01-13 DIAGNOSIS — I87.2 VENOUS INSUFFICIENCY OF BOTH LOWER EXTREMITIES: ICD-10-CM

## 2022-01-13 DIAGNOSIS — N30.00 ACUTE CYSTITIS WITHOUT HEMATURIA: Primary | ICD-10-CM

## 2022-01-13 DIAGNOSIS — E11.42 TYPE 2 DIABETES MELLITUS WITH DIABETIC POLYNEUROPATHY, WITH LONG-TERM CURRENT USE OF INSULIN: ICD-10-CM

## 2022-01-13 DIAGNOSIS — I89.0 LYMPHEDEMA OF BOTH LOWER EXTREMITIES: ICD-10-CM

## 2022-01-13 DIAGNOSIS — I87.2 VENOUS REFLUX: Primary | ICD-10-CM

## 2022-01-13 PROCEDURE — 87186 SC STD MICRODIL/AGAR DIL: CPT | Mod: HCNC | Performed by: SURGERY

## 2022-01-13 PROCEDURE — 87077 CULTURE AEROBIC IDENTIFY: CPT | Mod: HCNC | Performed by: SURGERY

## 2022-01-13 PROCEDURE — 87070 CULTURE OTHR SPECIMN AEROBIC: CPT | Mod: HCNC | Performed by: SURGERY

## 2022-01-13 PROCEDURE — 29581 APPL MULTLAYER CMPRN SYS LEG: CPT | Mod: HCNC

## 2022-01-13 RX ORDER — DOXYCYCLINE HYCLATE 100 MG
100 TABLET ORAL 2 TIMES DAILY
Qty: 20 TABLET | Refills: 0 | Status: ON HOLD | OUTPATIENT
Start: 2022-01-13 | End: 2022-01-19 | Stop reason: HOSPADM

## 2022-01-13 NOTE — PROGRESS NOTES
Subjective:       Patient ID: Sherin Ortiz is a 77 y.o. female.    Chief Complaint: Venous Stasis (Both lower legs)  12/2/2021:  Pt admit to [facility]. Wound consult for nonhealing wounds to bilateral lower extremities. Patient states that wounds have been present for about 1 month. She was seen at [facility] initially and has had home health to change dressings while waiting for this appointment. She denies pain to wounds. She is presently taking doxycycline for a UTI. Wounds do not have any apparent signs of infection at present. Edema noted bilaterally with positive pulses with doppler. Dr Perry assessed patient. Venous ultrasound ordered and scheduled for bilateral lower extremities. Wound care orders and plan of care instructed to patient who voiced understanding. Request sent to [facility] for [facility] compression for long term management of edema, as patient is unable to don typical compression stockings (even with zippers). Wound care orders sent to home health. Patient to follow up in 3 weeks with Dr Perry per pt request.  12/23/21: F/U with Dr. Perry. No open draining areas noted today. Several small newly healed lesions noted on the left lower leg. Large deflated skin blister noted on right lower leg. Patient had BLE ultrasound completed today that reveals venous reflux. Referral placed to Dr. Farfan. Orders sent to   1/13/22: F/U with Dr. Perry. Right lower leg red, edematous, and weeping. No open areas to left lower leg. Culture of right leg done. Patient to start Doxycycline today for cystitis. Hydrofera ready, ABD, cast padding to right lower leg. BLE Co-flex toes to knee. Patient  Scheduled with Dr. Bishop for bilateral venous reflux.    Review of Systems    All systems were reviewed and are negative, except that mentioned in the HPI.     Objective:         Physical Exam  Constitutional:       Appearance: She is well-developed and well-nourished.   HENT:      Head: Normocephalic and  atraumatic.   Eyes:      Extraocular Movements: EOM normal.      Pupils: Pupils are equal, round, and reactive to light.   Cardiovascular:      Rate and Rhythm: Normal rate.   Pulmonary:      Effort: Pulmonary effort is normal. No respiratory distress.      Breath sounds: No stridor.   Abdominal:      General: There is no distension.   Musculoskeletal:      Cervical back: Normal range of motion.   Skin:     Comments: See Synopsis for wound details   Neurological:      Mental Status: She is alert and oriented to person, place, and time.   Psychiatric:         Mood and Affect: Mood and affect normal.            Wound 12/02/21 0130 Right anterior;lower Leg (Active)   12/02/21 0130    Pre-existing:    Primary Wound Type:    Side: Right   Orientation: anterior;lower   Location: Leg   Wound Number:    Ankle-Brachial Index:    Pulses: positive with doppler   Removal Indication and Assessment:    Wound Outcome:    (Retired) Wound Type:    (Retired) Wound Length (cm):    (Retired) Wound Width (cm):    (Retired) Depth (cm):    Wound Description (Comments):    Removal Indications:    Wound Image   01/13/22 1500   Dressing Appearance Intact;Saturated 01/13/22 1500   Drainage Amount Large 01/13/22 1500   Drainage Characteristics/Odor Serous 01/13/22 1500   Appearance Red;Blistered 01/13/22 1500   Tissue loss description Partial thickness 01/13/22 1500   Red (%), Wound Tissue Color 100 % 01/13/22 1500   Periwound Area Edematous;Redness 01/13/22 1500   Wound Edges Undefined 01/13/22 1500   Care Cleansed with:;Sterile normal saline 01/13/22 1500   Dressing Applied;Hydrofiber;Absorptive Pad;Cast padding;Compression wrap 01/13/22 1500   Periwound Care Absorptive dressing applied 01/13/22 1500   Compression Two layer compression 01/13/22 1500   Dressing Change Due 01/17/22 01/13/22 1500            Wound 01/13/22 1400 Other (comment) Left Leg (Active)   01/13/22 1400    Pre-existing: Yes   Primary Wound Type: Other   Side: Left    Orientation:    Location: Leg   Wound Number:    Ankle-Brachial Index:    Pulses:    Removal Indication and Assessment:    Wound Outcome:    (Retired) Wound Type:    (Retired) Wound Length (cm):    (Retired) Wound Width (cm):    (Retired) Depth (cm):    Wound Description (Comments):    Removal Indications:    Wound Image   01/13/22 1500   Dressing Appearance Intact;Dry 01/13/22 1500   Drainage Amount None 01/13/22 1500   Appearance Pink;Dry 01/13/22 1500   Tissue loss description Not applicable 01/13/22 1500   Red (%), Wound Tissue Color 100 % 01/13/22 1500   Periwound Area Edematous 01/13/22 1500   Wound Edges Undefined 01/13/22 1500   Care Cleansed with:;Soap and water 01/13/22 1500   Dressing Compression wrap 01/13/22 1500   Periwound Care Moisturizer applied 01/13/22 1500   Compression Two layer compression 01/13/22 1500   Dressing Change Due 01/17/22 01/13/22 1500         Assessment:         ICD-10-CM ICD-9-CM   1. Venous reflux  I87.2 459.81   2. Venous insufficiency of both lower extremities  I87.2 459.81   3. Bilateral leg edema  R60.0 782.3   4. Lymphedema of both lower extremities  I89.0 457.1   5. Type 2 diabetes mellitus with diabetic polyneuropathy, with long-term current use of insulin  E11.42 250.60    Z79.4 357.2     V58.67         Plan:   Tissue pathology and/or culture taken:  [x] Yes [x] No   Sharp debridement performed:   [] Yes [x] No   Labs ordered this visit:   [] Yes [x] No   Imaging ordered this visit:   [] Yes [x] No           Orders Placed This Encounter   Procedures    Aerobic culture          Anaerobic culture          Change dressing     Cleanse wound with: Normal saline  Lidocaine: prn  Silver nitrate: prn  Primary dressing: Hydrofera ready to open areas, ABD pads, cast padding  Secondary dressing: Co-flex with calamine BLE toes to knee ( cover toes per request)  Edema control: BLE Co-flex with calamine. Elevate both lower legs as much as possible  Frequency: Mondays and  Thursdays  Follow-up: Dr. Perry  1/27/22.  Home Health: [facility] . Orders as above. Dressing changes Monday, Thursday, and prn.  Other Orders: Culture of right leg 1/13/22. Continue Doxycycline. F/u with Dr. Bishop.        Follow up in about 2 weeks (around 1/27/2022).  Pt to f/u with PCP re: fluid management to improve current non-pitting edema.

## 2022-01-15 LAB — BACTERIA UR CULT: ABNORMAL

## 2022-01-17 LAB
BACTERIA SPEC AEROBE CULT: ABNORMAL

## 2022-01-18 ENCOUNTER — HOSPITAL ENCOUNTER (INPATIENT)
Facility: HOSPITAL | Age: 77
LOS: 2 days | Discharge: HOME | DRG: 690 | End: 2022-01-21
Attending: EMERGENCY MEDICINE | Admitting: FAMILY MEDICINE
Payer: MEDICARE

## 2022-01-18 DIAGNOSIS — N12 PYELONEPHRITIS: ICD-10-CM

## 2022-01-18 DIAGNOSIS — Z99.3 WHEELCHAIR DEPENDENT: Chronic | ICD-10-CM

## 2022-01-18 DIAGNOSIS — M54.9 BACK PAIN, UNSPECIFIED BACK LOCATION, UNSPECIFIED BACK PAIN LATERALITY, UNSPECIFIED CHRONICITY: ICD-10-CM

## 2022-01-18 DIAGNOSIS — N39.0 URINARY TRACT INFECTION WITHOUT HEMATURIA, SITE UNSPECIFIED: ICD-10-CM

## 2022-01-18 DIAGNOSIS — N17.9 ACUTE KIDNEY INJURY: ICD-10-CM

## 2022-01-18 DIAGNOSIS — N17.9 ACUTE RENAL FAILURE SUPERIMPOSED ON STAGE 4 CHRONIC KIDNEY DISEASE, UNSPECIFIED ACUTE RENAL FAILURE TYPE: Primary | ICD-10-CM

## 2022-01-18 DIAGNOSIS — R07.9 CHEST PAIN: ICD-10-CM

## 2022-01-18 DIAGNOSIS — N18.4 ACUTE RENAL FAILURE SUPERIMPOSED ON STAGE 4 CHRONIC KIDNEY DISEASE, UNSPECIFIED ACUTE RENAL FAILURE TYPE: Primary | ICD-10-CM

## 2022-01-18 PROBLEM — E27.8 ADRENAL NODULE: Status: ACTIVE | Noted: 2022-01-18

## 2022-01-18 LAB
ALBUMIN SERPL BCP-MCNC: 2.9 G/DL (ref 3.5–5.2)
ALLENS TEST: ABNORMAL
ALP SERPL-CCNC: 207 U/L (ref 55–135)
ALT SERPL W/O P-5'-P-CCNC: 15 U/L (ref 10–44)
ANION GAP SERPL CALC-SCNC: 15 MMOL/L (ref 8–16)
AST SERPL-CCNC: 17 U/L (ref 10–40)
BACTERIA #/AREA URNS HPF: ABNORMAL /HPF
BASOPHILS # BLD AUTO: 0.03 K/UL (ref 0–0.2)
BASOPHILS NFR BLD: 0.6 % (ref 0–1.9)
BILIRUB SERPL-MCNC: 0.6 MG/DL (ref 0.1–1)
BILIRUB UR QL STRIP: NEGATIVE
BUN SERPL-MCNC: 40 MG/DL (ref 8–23)
CALCIUM SERPL-MCNC: 9.1 MG/DL (ref 8.7–10.5)
CHLORIDE SERPL-SCNC: 107 MMOL/L (ref 95–110)
CLARITY UR: ABNORMAL
CO2 SERPL-SCNC: 17 MMOL/L (ref 23–29)
COLOR UR: YELLOW
CREAT SERPL-MCNC: 2.2 MG/DL (ref 0.5–1.4)
CREAT UR-MCNC: 77.6 MG/DL (ref 15–325)
CTP QC/QA: YES
DIFFERENTIAL METHOD: ABNORMAL
EOSINOPHIL # BLD AUTO: 0 K/UL (ref 0–0.5)
EOSINOPHIL NFR BLD: 0.8 % (ref 0–8)
ERYTHROCYTE [DISTWIDTH] IN BLOOD BY AUTOMATED COUNT: 14.5 % (ref 11.5–14.5)
EST. GFR  (AFRICAN AMERICAN): 24 ML/MIN/1.73 M^2
EST. GFR  (NON AFRICAN AMERICAN): 21 ML/MIN/1.73 M^2
ESTIMATED AVG GLUCOSE: 194 MG/DL (ref 68–131)
GLUCOSE SERPL-MCNC: 181 MG/DL (ref 70–110)
GLUCOSE UR QL STRIP: ABNORMAL
HBA1C MFR BLD: 8.4 % (ref 4–5.6)
HCO3 UR-SCNC: 25.4 MMOL/L (ref 24–28)
HCT VFR BLD AUTO: 35.2 % (ref 37–48.5)
HGB BLD-MCNC: 11.6 G/DL (ref 12–16)
HGB UR QL STRIP: ABNORMAL
HYALINE CASTS #/AREA URNS LPF: 1 /LPF
IMM GRANULOCYTES # BLD AUTO: 0.02 K/UL (ref 0–0.04)
IMM GRANULOCYTES NFR BLD AUTO: 0.4 % (ref 0–0.5)
KETONES UR QL STRIP: NEGATIVE
LEUKOCYTE ESTERASE UR QL STRIP: ABNORMAL
LYMPHOCYTES # BLD AUTO: 1.3 K/UL (ref 1–4.8)
LYMPHOCYTES NFR BLD: 25.6 % (ref 18–48)
MCH RBC QN AUTO: 28.4 PG (ref 27–31)
MCHC RBC AUTO-ENTMCNC: 33 G/DL (ref 32–36)
MCV RBC AUTO: 86 FL (ref 82–98)
MICROSCOPIC COMMENT: ABNORMAL
MONOCYTES # BLD AUTO: 0.4 K/UL (ref 0.3–1)
MONOCYTES NFR BLD: 8.4 % (ref 4–15)
NEUTROPHILS # BLD AUTO: 3.1 K/UL (ref 1.8–7.7)
NEUTROPHILS NFR BLD: 64.2 % (ref 38–73)
NITRITE UR QL STRIP: NEGATIVE
NRBC BLD-RTO: 0 /100 WBC
PCO2 BLDA: 49 MMHG (ref 35–45)
PH SMN: 7.32 [PH] (ref 7.35–7.45)
PH UR STRIP: 8 [PH] (ref 5–8)
PLATELET # BLD AUTO: 253 K/UL (ref 150–450)
PMV BLD AUTO: 9.2 FL (ref 9.2–12.9)
PO2 BLDA: 19 MMHG (ref 40–60)
POC BE: -1 MMOL/L
POC SATURATED O2: 26 % (ref 95–100)
POC TCO2: 27 MMOL/L (ref 24–29)
POCT GLUCOSE: 143 MG/DL (ref 70–110)
POCT GLUCOSE: 166 MG/DL (ref 70–110)
POTASSIUM SERPL-SCNC: 4.9 MMOL/L (ref 3.5–5.1)
PROT SERPL-MCNC: 7.2 G/DL (ref 6–8.4)
PROT UR QL STRIP: ABNORMAL
PROT UR-MCNC: 283 MG/DL (ref 0–15)
PROT/CREAT UR: 3.65 MG/G{CREAT} (ref 0–0.2)
RBC # BLD AUTO: 4.09 M/UL (ref 4–5.4)
RBC #/AREA URNS HPF: 2 /HPF (ref 0–4)
SAMPLE: ABNORMAL
SARS-COV-2 RDRP RESP QL NAA+PROBE: NEGATIVE
SITE: ABNORMAL
SODIUM SERPL-SCNC: 139 MMOL/L (ref 136–145)
SP GR UR STRIP: 1.01 (ref 1–1.03)
URN SPEC COLLECT METH UR: ABNORMAL
UROBILINOGEN UR STRIP-ACNC: NEGATIVE EU/DL
WBC # BLD AUTO: 4.89 K/UL (ref 3.9–12.7)
WBC #/AREA URNS HPF: 7 /HPF (ref 0–5)
YEAST URNS QL MICRO: ABNORMAL

## 2022-01-18 PROCEDURE — U0002 COVID-19 LAB TEST NON-CDC: HCPCS | Mod: HCNC | Performed by: HOSPITALIST

## 2022-01-18 PROCEDURE — 96361 HYDRATE IV INFUSION ADD-ON: CPT | Mod: HCNC

## 2022-01-18 PROCEDURE — 99900035 HC TECH TIME PER 15 MIN (STAT): Mod: HCNC

## 2022-01-18 PROCEDURE — 81000 URINALYSIS NONAUTO W/SCOPE: CPT | Mod: HCNC | Performed by: EMERGENCY MEDICINE

## 2022-01-18 PROCEDURE — 25000003 PHARM REV CODE 250: Mod: HCNC | Performed by: EMERGENCY MEDICINE

## 2022-01-18 PROCEDURE — 96365 THER/PROPH/DIAG IV INF INIT: CPT | Mod: HCNC

## 2022-01-18 PROCEDURE — 25000003 PHARM REV CODE 250: Mod: HCNC | Performed by: NURSE PRACTITIONER

## 2022-01-18 PROCEDURE — 96372 THER/PROPH/DIAG INJ SC/IM: CPT | Mod: 59,HCNC

## 2022-01-18 PROCEDURE — 84156 ASSAY OF PROTEIN URINE: CPT | Mod: HCNC | Performed by: EMERGENCY MEDICINE

## 2022-01-18 PROCEDURE — 87086 URINE CULTURE/COLONY COUNT: CPT | Mod: HCNC | Performed by: HOSPITALIST

## 2022-01-18 PROCEDURE — 96372 THER/PROPH/DIAG INJ SC/IM: CPT | Mod: 59

## 2022-01-18 PROCEDURE — 80053 COMPREHEN METABOLIC PANEL: CPT | Mod: HCNC | Performed by: EMERGENCY MEDICINE

## 2022-01-18 PROCEDURE — 63600175 PHARM REV CODE 636 W HCPCS: Mod: HCNC | Performed by: NURSE PRACTITIONER

## 2022-01-18 PROCEDURE — A4216 STERILE WATER/SALINE, 10 ML: HCPCS | Mod: HCNC | Performed by: NURSE PRACTITIONER

## 2022-01-18 PROCEDURE — 85025 COMPLETE CBC W/AUTO DIFF WBC: CPT | Mod: HCNC | Performed by: EMERGENCY MEDICINE

## 2022-01-18 PROCEDURE — 82803 BLOOD GASES ANY COMBINATION: CPT | Mod: HCNC

## 2022-01-18 PROCEDURE — 82962 GLUCOSE BLOOD TEST: CPT | Mod: HCNC

## 2022-01-18 PROCEDURE — 25000003 PHARM REV CODE 250: Mod: HCNC | Performed by: HOSPITALIST

## 2022-01-18 PROCEDURE — G0378 HOSPITAL OBSERVATION PER HR: HCPCS | Mod: HCNC

## 2022-01-18 PROCEDURE — 82043 UR ALBUMIN QUANTITATIVE: CPT | Mod: HCNC | Performed by: EMERGENCY MEDICINE

## 2022-01-18 PROCEDURE — 63600175 PHARM REV CODE 636 W HCPCS: Mod: HCNC | Performed by: EMERGENCY MEDICINE

## 2022-01-18 PROCEDURE — 83036 HEMOGLOBIN GLYCOSYLATED A1C: CPT | Mod: HCNC | Performed by: EMERGENCY MEDICINE

## 2022-01-18 PROCEDURE — 87040 BLOOD CULTURE FOR BACTERIA: CPT | Mod: HCNC | Performed by: EMERGENCY MEDICINE

## 2022-01-18 PROCEDURE — 99285 EMERGENCY DEPT VISIT HI MDM: CPT | Mod: 25,HCNC

## 2022-01-18 RX ORDER — IBUPROFEN 200 MG
16 TABLET ORAL
Status: DISCONTINUED | OUTPATIENT
Start: 2022-01-18 | End: 2022-01-21 | Stop reason: HOSPADM

## 2022-01-18 RX ORDER — HYDROCODONE BITARTRATE AND ACETAMINOPHEN 5; 325 MG/1; MG/1
1 TABLET ORAL EVERY 6 HOURS PRN
Status: DISCONTINUED | OUTPATIENT
Start: 2022-01-18 | End: 2022-01-21 | Stop reason: HOSPADM

## 2022-01-18 RX ORDER — SODIUM CHLORIDE 0.9 % (FLUSH) 0.9 %
10 SYRINGE (ML) INJECTION EVERY 8 HOURS
Status: DISCONTINUED | OUTPATIENT
Start: 2022-01-18 | End: 2022-01-21 | Stop reason: HOSPADM

## 2022-01-18 RX ORDER — IBUPROFEN 200 MG
24 TABLET ORAL
Status: DISCONTINUED | OUTPATIENT
Start: 2022-01-18 | End: 2022-01-21 | Stop reason: HOSPADM

## 2022-01-18 RX ORDER — NAPROXEN SODIUM 220 MG/1
81 TABLET, FILM COATED ORAL DAILY
Status: DISCONTINUED | OUTPATIENT
Start: 2022-01-18 | End: 2022-01-21 | Stop reason: HOSPADM

## 2022-01-18 RX ORDER — NALOXONE HCL 0.4 MG/ML
0.02 VIAL (ML) INJECTION
Status: DISCONTINUED | OUTPATIENT
Start: 2022-01-18 | End: 2022-01-21 | Stop reason: HOSPADM

## 2022-01-18 RX ORDER — MICONAZOLE NITRATE 2 %
POWDER (GRAM) TOPICAL 2 TIMES DAILY
Status: DISCONTINUED | OUTPATIENT
Start: 2022-01-18 | End: 2022-01-21 | Stop reason: HOSPADM

## 2022-01-18 RX ORDER — HYDROCODONE BITARTRATE AND ACETAMINOPHEN 5; 325 MG/1; MG/1
2 TABLET ORAL
Status: COMPLETED | OUTPATIENT
Start: 2022-01-18 | End: 2022-01-18

## 2022-01-18 RX ORDER — ONDANSETRON 2 MG/ML
4 INJECTION INTRAMUSCULAR; INTRAVENOUS EVERY 8 HOURS PRN
Status: DISCONTINUED | OUTPATIENT
Start: 2022-01-18 | End: 2022-01-21 | Stop reason: HOSPADM

## 2022-01-18 RX ORDER — INSULIN ASPART 100 [IU]/ML
0-5 INJECTION, SOLUTION INTRAVENOUS; SUBCUTANEOUS
Status: DISCONTINUED | OUTPATIENT
Start: 2022-01-18 | End: 2022-01-21 | Stop reason: HOSPADM

## 2022-01-18 RX ORDER — IPRATROPIUM BROMIDE AND ALBUTEROL SULFATE 2.5; .5 MG/3ML; MG/3ML
3 SOLUTION RESPIRATORY (INHALATION) EVERY 4 HOURS PRN
Status: DISCONTINUED | OUTPATIENT
Start: 2022-01-18 | End: 2022-01-21 | Stop reason: HOSPADM

## 2022-01-18 RX ORDER — ATORVASTATIN CALCIUM 40 MG/1
40 TABLET, FILM COATED ORAL NIGHTLY
Status: DISCONTINUED | OUTPATIENT
Start: 2022-01-18 | End: 2022-01-21 | Stop reason: HOSPADM

## 2022-01-18 RX ORDER — HEPARIN SODIUM 5000 [USP'U]/ML
5000 INJECTION, SOLUTION INTRAVENOUS; SUBCUTANEOUS EVERY 8 HOURS
Status: DISCONTINUED | OUTPATIENT
Start: 2022-01-18 | End: 2022-01-21 | Stop reason: HOSPADM

## 2022-01-18 RX ORDER — GLUCAGON 1 MG
1 KIT INJECTION
Status: DISCONTINUED | OUTPATIENT
Start: 2022-01-18 | End: 2022-01-21 | Stop reason: HOSPADM

## 2022-01-18 RX ORDER — ACETAMINOPHEN 325 MG/1
650 TABLET ORAL EVERY 8 HOURS PRN
Status: DISCONTINUED | OUTPATIENT
Start: 2022-01-18 | End: 2022-01-21 | Stop reason: HOSPADM

## 2022-01-18 RX ORDER — TALC
6 POWDER (GRAM) TOPICAL NIGHTLY PRN
Status: DISCONTINUED | OUTPATIENT
Start: 2022-01-18 | End: 2022-01-21 | Stop reason: HOSPADM

## 2022-01-18 RX ORDER — CLOPIDOGREL BISULFATE 75 MG/1
75 TABLET ORAL DAILY
Status: DISCONTINUED | OUTPATIENT
Start: 2022-01-18 | End: 2022-01-21 | Stop reason: HOSPADM

## 2022-01-18 RX ORDER — NIFEDIPINE 30 MG/1
60 TABLET, EXTENDED RELEASE ORAL DAILY
Status: DISCONTINUED | OUTPATIENT
Start: 2022-01-18 | End: 2022-01-21 | Stop reason: HOSPADM

## 2022-01-18 RX ORDER — POLYETHYLENE GLYCOL 3350 17 G/17G
17 POWDER, FOR SOLUTION ORAL DAILY PRN
Status: DISCONTINUED | OUTPATIENT
Start: 2022-01-18 | End: 2022-01-21 | Stop reason: HOSPADM

## 2022-01-18 RX ADMIN — ASPIRIN 81 MG CHEWABLE TABLET 81 MG: 81 TABLET CHEWABLE at 09:01

## 2022-01-18 RX ADMIN — SODIUM CHLORIDE, SODIUM LACTATE, POTASSIUM CHLORIDE, AND CALCIUM CHLORIDE 500 ML: .6; .31; .03; .02 INJECTION, SOLUTION INTRAVENOUS at 05:01

## 2022-01-18 RX ADMIN — CEFTRIAXONE 1 G: 1 INJECTION, SOLUTION INTRAVENOUS at 04:01

## 2022-01-18 RX ADMIN — Medication 10 ML: at 03:01

## 2022-01-18 RX ADMIN — HEPARIN SODIUM 5000 UNITS: 5000 INJECTION INTRAVENOUS; SUBCUTANEOUS at 03:01

## 2022-01-18 RX ADMIN — ATORVASTATIN CALCIUM 40 MG: 40 TABLET, FILM COATED ORAL at 09:01

## 2022-01-18 RX ADMIN — MICONAZOLE NITRATE 2 % TOPICAL POWDER: at 05:01

## 2022-01-18 RX ADMIN — CLOPIDOGREL 75 MG: 75 TABLET, FILM COATED ORAL at 09:01

## 2022-01-18 RX ADMIN — NIFEDIPINE 60 MG: 30 TABLET, FILM COATED, EXTENDED RELEASE ORAL at 09:01

## 2022-01-18 RX ADMIN — HEPARIN SODIUM 5000 UNITS: 5000 INJECTION INTRAVENOUS; SUBCUTANEOUS at 09:01

## 2022-01-18 RX ADMIN — MICONAZOLE NITRATE 2 % TOPICAL POWDER: at 09:01

## 2022-01-18 RX ADMIN — HYDROCODONE BITARTRATE AND ACETAMINOPHEN 2 TABLET: 5; 325 TABLET ORAL at 02:01

## 2022-01-18 RX ADMIN — Medication 10 ML: at 10:01

## 2022-01-18 NOTE — SUBJECTIVE & OBJECTIVE
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    Benign essential hypertension 11/14/2012    Cataract     s/p phacoemulsification    Chronic kidney disease (CKD), stage III (moderate) 9/12/2013    Diabetic peripheral neuropathy associated with type 2 diabetes mellitus 11/14/2014    causing right hemiparesis    Gait disorder     Hyperlipidemia     Iritis - Both Eyes 6/10/2013    Kidney stone     Lymphedema     Morbid obesity with BMI of 40.0-44.9, adult 2/18/2015    Nephrolithiasis 4/20/2016    NS (nuclear sclerosis) 4/1/2013    Nuclear sclerosis - Both Eyes 4/29/2013    Preseptal cellulitis - Right Eye 4/29/2013    Proliferative diabetic retinopathy - Both Eyes 4/29/2013    Proliferative diabetic retinopathy, both eyes 4/1/2013    PSC (posterior subcapsular cataract) - Both Eyes 4/29/2013    S/P hernia repair 12/19/2012    TIA (transient ischemic attack) 11/18/2014    Tinea pedis 7/24/2012    Tinea pedis is present on both feet.     Type 2 diabetes mellitus with diabetic polyneuropathy, with long-term current use of insulin 9/18/2015    Type 2 diabetes mellitus with renal manifestations, controlled 12/12/2013    Type 2 diabetes, controlled, with moderate nonproliferative diabetic retinopathy without macular edema 9/17/2015    Ulcer of left lower extremity, limited to breakdown of skin 7/8/2015    Unspecified cerebral artery occlusion with cerebral infarction 11/16/2014    Unspecified venous (peripheral) insufficiency     Ureteral stone with hydronephrosis 1/27/2016    UTI (lower urinary tract infection)     Vaginal infection     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       Past Surgical History:   Procedure Laterality Date    APPENDECTOMY      CATARACT EXTRACTION W/  INTRAOCULAR LENS IMPLANT Left 5/21/2013    CATARACT EXTRACTION W/  INTRAOCULAR LENS IMPLANT Right 6/4/2013    CHOLECYSTECTOMY      COLONOSCOPY  12/22/2005    normal  "   ESOPHAGOGASTRODUODENOSCOPY  12/21/2015    hiatal hernia, Schatzki ring    EYE SURGERY Bilateral 2008    laser surgery both eyes    NASAL SEPTUM SURGERY      SUBTOTAL COLECTOMY  12/13/2012    transverse colon, for incarcerated umbilical hernia, Dr. Kat Bower       Review of patient's allergies indicates:   Allergen Reactions    Penicillins Hives     Other reaction(s): Hives  Patient has received cefdinir, ceftriaxone, cefazolin and cefepime in the past with no documented reactions    Sulfa (sulfonamide antibiotics) Other (See Comments)     Eyad, pt states her doctor told her the shakes were possibly caused by an allergy to sulfa       No current facility-administered medications on file prior to encounter.     Current Outpatient Medications on File Prior to Encounter   Medication Sig    acetaminophen (TYLENOL) 325 MG tablet Take 2 tablets (650 mg total) by mouth every 6 (six) hours as needed for Pain.    alcohol swabs (BD ALCOHOL SWABS) PadM Apply 1 each topically 2 (two) times daily.    aspirin 81 MG Chew Take 81 mg by mouth once daily.    atorvastatin (LIPITOR) 40 MG tablet TAKE 1 TABLET EVERY EVENING    BD INSULIN SYRINGE ULTRA-FINE 0.5 mL 30 gauge x 1/2" Syrg USE TO INJECT EVERY NIGHT    blood glucose control, high (TRUE METRIX LEVEL 3) Soln Used to calibrate weekly    blood sugar diagnostic (TRUE METRIX GLUCOSE TEST STRIP) Strp Test twice daily    blood-glucose meter (TRUE METRIX GLUCOSE METER) kit Use as directed to test glucose (Patient not taking: Reported on 12/6/2021)    blood-glucose meter Misc Dispense one meter: Insurance Brand Preference Accu-Chek (Patient not taking: Reported on 12/6/2021)    clopidogreL (PLAVIX) 75 mg tablet Take 1 tablet (75 mg total) by mouth once daily.    doxycycline (VIBRA-TABS) 100 MG tablet Take 1 tablet (100 mg total) by mouth 2 (two) times daily.    gentamicin (GARAMYCIN) 0.3 % ophthalmic solution Place 1 drop into both eyes 3 (three) times " "daily.    insulin glargine (LANTUS U-100 INSULIN) 100 unit/mL injection INJECT 30-35 UNITS SUBCUTANEOUSLY IN THE EVENING DEPENDING ON SCALE (DISCARD EACH VIAL AFTER 28 DAYS)    insulin syr/ndl U100 half yesenia (DROPLET INSULIN SYR HALF UNIT) 0.5 mL 30 gauge x 1/2" Syrg USE TO INJECT EVERY NIGHT    ketoconazole (NIZORAL) 2 % cream Apply topically once daily.    lancets (ACCU-CHEK SOFTCLIX LANCETS) Misc Apply 1 lancet topically 2 (two) times a day. (Patient not taking: Reported on 2021)    lancets (TRUEPLUS LANCETS) 33 gauge Misc 1 lancet by Misc.(Non-Drug; Combo Route) route 2 (two) times a day.    miconazole nitrate 2% (MICOTIN) 2 % Oint Apply topically 2 (two) times daily.    NIFEdipine (PROCARDIA-XL) 60 MG (OSM) 24 hr tablet Take 1 tablet (60 mg total) by mouth once daily.    triamcinolone acetonide 0.1% (KENALOG) 0.1 % cream Apply topically 2 (two) times daily.     Family History     Problem Relation (Age of Onset)    Cataracts Sister, Brother    Diabetes Sister    Heart disease Brother    Leukemia Mother        Tobacco Use    Smoking status: Former Smoker     Packs/day: 0.50     Years: 15.00     Pack years: 7.50     Types: Cigarettes     Quit date: 1982     Years since quittin.5    Smokeless tobacco: Former User    Tobacco comment: smoked one pack per week   Substance and Sexual Activity    Alcohol use: No    Drug use: No    Sexual activity: Not Currently     Review of Systems   Constitutional: Positive for appetite change and chills.   HENT: Negative.    Respiratory: Negative.    Cardiovascular: Negative.    Gastrointestinal: Positive for abdominal pain. Negative for abdominal distention, diarrhea, nausea and vomiting.   Endocrine: Negative.    Genitourinary: Positive for flank pain. Negative for dysuria.   Musculoskeletal: Positive for back pain.   Neurological: Negative.      Objective:     Vital Signs (Most Recent):  Temp: 97.4 °F (36.3 °C) (22)  Pulse: 76 (22 " "2023)  Resp: 19 (01/18/22 0223)  BP: (!) 157/73 (01/17/22 2023)  SpO2: 100 % (01/17/22 2023) Vital Signs (24h Range):  Temp:  [97.4 °F (36.3 °C)] 97.4 °F (36.3 °C)  Pulse:  [76] 76  Resp:  [16-19] 19  SpO2:  [100 %] 100 %  BP: (157)/(73) 157/73     Weight: 113.4 kg (250 lb)  Body mass index is 39.16 kg/m².    Physical Exam  Vitals and nursing note reviewed.   Constitutional:       General: She is not in acute distress.     Appearance: Normal appearance. She is obese. She is not ill-appearing.   HENT:      Head: Normocephalic and atraumatic.      Mouth/Throat:      Mouth: Mucous membranes are dry.   Eyes:      Extraocular Movements: Extraocular movements intact.      Pupils: Pupils are equal, round, and reactive to light.   Cardiovascular:      Rate and Rhythm: Normal rate and regular rhythm.      Pulses: Normal pulses.      Heart sounds: Normal heart sounds. No murmur heard.      Pulmonary:      Effort: Pulmonary effort is normal. No respiratory distress.      Breath sounds: Normal breath sounds. No wheezing.   Abdominal:      General: Bowel sounds are normal. There is no distension.      Palpations: Abdomen is soft. There is no mass.      Tenderness: There is no abdominal tenderness. There is right CVA tenderness. There is no guarding or rebound.   Musculoskeletal:         General: Swelling present. Normal range of motion.      Cervical back: Normal range of motion.      Comments: Camilo taylor noted to BLE "wounds"   Patient states she had wound care today and does not wish to unwrap     Skin:     General: Skin is warm and dry.      Capillary Refill: Capillary refill takes 2 to 3 seconds.      Comments: Venous stasis skin changes noted   Neurological:      General: No focal deficit present.      Mental Status: She is alert and oriented to person, place, and time. Mental status is at baseline.   Psychiatric:         Mood and Affect: Mood normal.         Behavior: Behavior normal.         Thought Content: Thought content " normal.         Judgment: Judgment normal.           CRANIAL NERVES     CN III, IV, VI   Pupils are equal, round, and reactive to light.       Significant Labs:   All pertinent labs within the past 24 hours have been reviewed.  Recent Lab Results       01/18/22  0238   01/18/22  0215        Albumin   2.9       Alkaline Phosphatase   207       ALT   15       Anion Gap   15       Appearance, UA Hazy         AST   17       Bacteria, UA Many         Baso #   0.03       Basophil %   0.6       Bilirubin (UA) Negative         BILIRUBIN TOTAL   0.6  Comment: For infants and newborns, interpretation of results should be based  on gestational age, weight and in agreement with clinical  observations.    Premature Infant recommended reference ranges:  Up to 24 hours.............<8.0 mg/dL  Up to 48 hours............<12.0 mg/dL  3-5 days..................<15.0 mg/dL  6-29 days.................<15.0 mg/dL         BUN   40       Calcium   9.1       Chloride   107       CO2   17       Color, UA Yellow         Creatinine   2.2       Differential Method   Automated       eGFR if    24       eGFR if non    21  Comment: Calculation used to obtain the estimated glomerular filtration  rate (eGFR) is the CKD-EPI equation.          Eos #   0.0       Eosinophil %   0.8       Estimated Avg Glucose   194       Glucose   181       Glucose, UA 3+         Gran # (ANC)   3.1       Gran %   64.2       Hematocrit   35.2       Hemoglobin   11.6       Hemoglobin A1C External   8.4  Comment: ADA Screening Guidelines:  5.7-6.4%  Consistent with prediabetes  >or=6.5%  Consistent with diabetes    High levels of fetal hemoglobin interfere with the HbA1C  assay. Heterozygous hemoglobin variants (HbS, HgC, etc)do  not significantly interfere with this assay.   However, presence of multiple variants may affect accuracy.         Hyaline Casts, UA 1         Immature Grans (Abs)   0.02  Comment: Mild elevation in immature  granulocytes is non specific and   can be seen in a variety of conditions including stress response,   acute inflammation, trauma and pregnancy. Correlation with other   laboratory and clinical findings is essential.         Immature Granulocytes   0.4       Ketones, UA Negative         Leukocytes, UA 3+         Lymph #   1.3       Lymph %   25.6       MCH   28.4       MCHC   33.0       MCV   86       Microscopic Comment SEE COMMENT  Comment: Other formed elements not mentioned in the report are not   present in the microscopic examination.            Mono #   0.4       Mono %   8.4       MPV   9.2       NITRITE UA Negative         nRBC   0       Occult Blood UA Trace         pH, UA 8.0         Platelets   253       Potassium   4.9       PROTEIN TOTAL   7.2       Protein, UA 2+  Comment: Recommend a 24 hour urine protein or a urine   protein/creatinine ratio if globulin induced proteinuria is  clinically suspected.           RBC   4.09       RBC, UA 2         RDW   14.5       Sodium   139       Specific [place], UA 1.015         Specimen UA Urine, Clean Catch         UROBILINOGEN UA Negative         WBC, UA 7         WBC   4.89       Yeast, UA None               Significant Imaging: I have reviewed all pertinent imaging results/findings within the past 24 hours.

## 2022-01-18 NOTE — CONSULTS
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    Benign essential hypertension 11/14/2012    Cataract     s/p phacoemulsification    Chronic kidney disease (CKD), stage III (moderate) 9/12/2013    Diabetic peripheral neuropathy associated with type 2 diabetes mellitus 11/14/2014    causing right hemiparesis    Gait disorder     Hyperlipidemia     Iritis - Both Eyes 6/10/2013    Kidney stone     Lymphedema     Morbid obesity with BMI of 40.0-44.9, adult 2/18/2015    Nephrolithiasis 4/20/2016    NS (nuclear sclerosis) 4/1/2013    Nuclear sclerosis - Both Eyes 4/29/2013    Preseptal cellulitis - Right Eye 4/29/2013    Proliferative diabetic retinopathy - Both Eyes 4/29/2013    Proliferative diabetic retinopathy, both eyes 4/1/2013    PSC (posterior subcapsular cataract) - Both Eyes 4/29/2013    S/P hernia repair 12/19/2012    TIA (transient ischemic attack) 11/18/2014    Tinea pedis 7/24/2012    Tinea pedis is present on both feet.     Type 2 diabetes mellitus with diabetic polyneuropathy, with long-term current use of insulin 9/18/2015    Type 2 diabetes mellitus with renal manifestations, controlled 12/12/2013    Type 2 diabetes, controlled, with moderate nonproliferative diabetic retinopathy without macular edema 9/17/2015    Ulcer of left lower extremity, limited to breakdown of skin 7/8/2015    Unspecified cerebral artery occlusion with cerebral infarction 11/16/2014    Unspecified venous (peripheral) insufficiency     Ureteral stone with hydronephrosis 1/27/2016    UTI (lower urinary tract infection)     Vaginal infection     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       Social History     Socioeconomic History    Marital status:    Tobacco Use    Smoking status: Former Smoker      Packs/day: 0.50     Years: 15.00     Pack years: 7.50     Types: Cigarettes     Quit date: 1982     Years since quittin.5    Smokeless tobacco: Former User    Tobacco comment: smoked one pack per week   Substance and Sexual Activity    Alcohol use: No    Drug use: No    Sexual activity: Not Currently   Social History Narrative    Lives alone. Ambulatory via motorized wheelchair. Catches bus to come to appts.        Precautions:     Allergies as of 2022 - Reviewed 2022   Allergen Reaction Noted    Penicillins Hives 2012    Sulfa (sulfonamide antibiotics) Other (See Comments) 2014       WOC Assessment Details/Treatment     R leg- scattered partial thickness ulcerations- history of venous stasis- mild edema      L leg- no open wound- mild edema      Sacrum/coccyx- blanchable redness        Abdomen skin folds and naval with red rash- fungal appearance    Recommendations discussed with pt, nurse and :  - Continue compression wraps for BLE twice weekly as per Dr. Perry in outpt wound care  - Miconazole powder to abdomen skin folds  - Pressure injury prevention interventions to include waffle overlay    2022

## 2022-01-18 NOTE — ASSESSMENT & PLAN NOTE
Hemoglobin A1C   Date Value Ref Range Status   01/18/2022 8.4 (H) 4.0 - 5.6 % Final   -SSI  -detemir 30 units nightly  -accuchecks ACHS  -diabetic diet

## 2022-01-18 NOTE — Clinical Note
Is this patient a high probability for COVID-19?: No   Diagnosis: Back pain, unspecified back location, unspecified back pain laterality, unspecified chronicity [[id number]]   Future Attending Provider: LE NGO [[id number]]   Admitting Provider:: LE NGO [[id number]]   Special Needs:: No Special Needs [1]

## 2022-01-18 NOTE — ED NOTES
PT arrives via EMS with c/o lower back pain, loss of appetite with generalized weakness. Pt reports recent dx of UTI with abx treat. Pt is AOx3; vital signs stable; respirations equal and unlabored; moderate edema noted to BUE and BLE; NAD noted.

## 2022-01-18 NOTE — ASSESSMENT & PLAN NOTE
Patient is identified as having Diastolic (HFpEF) heart failure that is Chronic. CHF is currently controlled. Latest ECHO performed and demonstrates- Results for orders placed during the hospital encounter of 11/21/20    Echo Color Flow Doppler? Yes    Interpretation Summary  · Mild left atrial enlargement.  · There is left ventricular eccentric hypertrophy.  · The left ventricle is normal in size with normal systolic function. The estimated ejection fraction is 60%.  · Indeterminate diastolic function.  · Normal right ventricular size with normal right ventricular systolic function.  · The estimated PA systolic pressure is 31 mmHg.  · Normal central venous pressure (3 mmHg).  . Continue home meds and monitor clinical status closely. Monitor on telemetry. Patient is off CHF pathway.  Monitor strict Is&Os and daily weights.  Place on fluid restriction of 1.5 L. Continue to stress to patient importance of self efficacy and  on diet for CHF. Last BNP reviewed- and noted below No results for input(s): BNP, BNPTRIAGEBLO in the last 168 hours..

## 2022-01-18 NOTE — ED NOTES
APPEARANCE: Alert, oriented and in no acute distress.  CARDIAC: Normal rate and rhythm, no murmur heard.   PERIPHERAL VASCULAR: peripheral pulses present. Normal cap refill. No edema. Warm to touch.    RESPIRATORY:Normal rate and effort, breath sounds diminished  clear bilaterally throughout chest. Respirations are equal and unlabored no obvious signs of distress.denies sob,   GASTRO: soft, bowel sounds normal, no tenderness, no abdominal distention. Obese.  MUSC: Full ROM. No bony tenderness or soft tissue tenderness. No obvious deformity.  SKIN: Skin is warm and dry, normal skin turgor, mucous membranes moist. Redness noted to abd fold yessenia upper thigh, pt cleaned, redness now pink.  NEURO: 5/5 strength major flexors/extensors bilaterally. Sensory intact to light touch bilaterally. Antionette coma scale: eyes open spontaneously-4, oriented & converses-5, obeys commands-6. No neurological abnormalities.   MENTAL STATUS: awake, alert and aware of environment.  EYE: PERRL, both eyes: pupils brisk and reactive to light. Normal size.  ENT: EARS: no obvious drainage. NOSE: no active bleeding.   BREAST: symmetrical. No masses. No tenderness.  .

## 2022-01-18 NOTE — PHARMACY MED REC
"Admission Medication History     The home medication history was taken by Shanda Dang CPhT.    Medication history obtained from,[facility] pharmacy    You may go to "Admission" then "Reconcile Home Medications" tabs to review and/or act upon these items.      The home medication list has been updated by the Pharmacy department.    Please read ALL comments highlighted in yellow.    Please address this information as you see fit.     Feel free to contact us if you have any questions or require assistance.              Shanda Dang CPhT.  Ext [phone]                  .          "

## 2022-01-18 NOTE — HPI
Sherin Ortiz is a 77 y.o. 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), Benign essential hypertension (11/14/2012), Cataract, Chronic kidney disease (CKD), stage III (moderate) (9/12/2013), Diabetic peripheral neuropathy associated with type 2 diabetes mellitus (11/14/2014), Gait disorder, Hyperlipidemia, Iritis - Both Eyes (6/10/2013), Kidney stone, Lymphedema, Morbid obesity with BMI of 40.0-44.9, adult (2/18/2015), Nephrolithiasis (4/20/2016), NS (nuclear sclerosis) (4/1/2013), Nuclear sclerosis - Both Eyes (4/29/2013), Preseptal cellulitis - Right Eye (4/29/2013), Proliferative diabetic retinopathy - Both Eyes (4/29/2013), Proliferative diabetic retinopathy, both eyes (4/1/2013), PSC (posterior subcapsular cataract) - Both Eyes (4/29/2013), S/P hernia repair (12/19/2012), TIA (transient ischemic attack) (11/18/2014), Tinea pedis (7/24/2012), Type 2 diabetes mellitus with diabetic polyneuropathy, with long-term current use of insulin (9/18/2015), Type 2 diabetes mellitus with renal manifestations, controlled (12/12/2013), Type 2 diabetes, controlled, with moderate nonproliferative diabetic retinopathy without macular edema (9/17/2015), Ulcer of left lower extremity, limited to breakdown of skin (7/8/2015), Unspecified cerebral artery occlusion with cerebral infarction (11/16/2014), Unspecified venous (peripheral) insufficiency, Ureteral stone with hydronephrosis (1/27/2016), UTI (lower urinary tract infection), Vaginal infection, and 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     The patient presented to the ED due to low back pain, dysuria, abdominal pain, and decreased appetite. When re interviewed she states she never has symptoms such as dysuria or frequency when she has UTIs so that is why she never knows when she has an infection. Patient's other symptoms have been going on approximately 1 week.  On January 14, 2022 patient was prescribed doxycycline  for presumed UTI.  She reports persistent pain since then.  She has not tried taking any pain medication. Denies history of trauma incontinence numbness or weakness. She also reports an infection to her lower extremities and will underwent wound care food dressed her wounds today. In ED labs remarkable for UTI on UA and LOREN on CKD. There is urinary bladder wall thickening and perivesicular haziness, correlation for UTI/cystitis is needed. CT abd/pelvis:There is mild prominence of the middle pole calyx of the left kidney, without additional evidence for hydronephrosis, and without evidence for ureteral calculus or obstructive uropathy otherwise seen, this may relate to sequelae of a recently passed calculus, may relate to variant anatomy, correlation for UTI/pyelonephritis is needed, note is made there does not appear to be significant perinephric inflammatory change. Cystic lesion of the right adnexa again noted, stable appearance, clinical and historical correlation is needed. Bilateral adrenal nodules, likely represent adrenal adenomas. Started on Rocephin. Admitted to [facility] for further care.

## 2022-01-18 NOTE — ASSESSMENT & PLAN NOTE
c/o back pain and decreased appetite lasting approximately 1 week.    History of renal stones with stenting and Ecoli  1/14/22 was prescribed doxycycline for presumed UTI.  She reports persistent pain since then.    UA positive  CT abd/pelvis: There is urinary bladder wall thickening and perivesicular haziness, correlation for UTI/cystitis is needed. There is mild prominence of the middle pole calyx of the left kidney, without additional evidence for hydronephrosis, and without evidence for ureteral calculus or obstructive uropathy otherwise seen, this may relate to sequelae of a recently passed calculus, may relate to variant anatomy, correlation for UTI/pyelonephritis is needed, note is made there does not appear to be significant perinephric inflammatory change. Cystic lesion of the right adnexa again noted, stable appearance, clinical and historical correlation is needed. Bilateral adrenal nodules, likely represent adrenal adenomas.  -Started on rocephin in ED-continue  -consult nephrology

## 2022-01-18 NOTE — ED PROVIDER NOTES
Encounter Date: 1/17/2022       History     Chief Complaint   Patient presents with    Back Pain     Reports lower back pain and loss of appetite with generalized weakness and feeling of nervousness. Pt reports current use of abx for UTI     Sherin Ortiz is a 77 y.o. 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), Benign essential hypertension (11/14/2012), Cataract, Chronic kidney disease (CKD), stage III (moderate) (9/12/2013), Diabetic peripheral neuropathy associated with type 2 diabetes mellitus (11/14/2014), Gait disorder, Hyperlipidemia, Iritis - Both Eyes (6/10/2013), Kidney stone, Lymphedema, Morbid obesity with BMI of 40.0-44.9, adult (2/18/2015), Nephrolithiasis (4/20/2016), NS (nuclear sclerosis) (4/1/2013), Nuclear sclerosis - Both Eyes (4/29/2013), Preseptal cellulitis - Right Eye (4/29/2013), Proliferative diabetic retinopathy - Both Eyes (4/29/2013), Proliferative diabetic retinopathy, both eyes (4/1/2013), PSC (posterior subcapsular cataract) - Both Eyes (4/29/2013), S/P hernia repair (12/19/2012), TIA (transient ischemic attack) (11/18/2014), Tinea pedis (7/24/2012), Type 2 diabetes mellitus with diabetic polyneuropathy, with long-term current use of insulin (9/18/2015), Type 2 diabetes mellitus with renal manifestations, controlled (12/12/2013), Type 2 diabetes, controlled, with moderate nonproliferative diabetic retinopathy without macular edema (9/17/2015), Ulcer of left lower extremity, limited to breakdown of skin (7/8/2015), Unspecified cerebral artery occlusion with cerebral infarction (11/16/2014), Unspecified venous (peripheral) insufficiency, Ureteral stone with hydronephrosis (1/27/2016), UTI (lower urinary tract infection), Vaginal infection, and 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    The patient presents to the ED due to low back pain dysuria abdominal pain and decreased appetite.  Patient's symptoms have been going on  approximately 1 week.  On January 14, 2022 patient was prescribed doxycycline for presumed UTI.  She reports persistent pain since then.  She has not tried taking any pain medication.  Denies history of trauma incontinence numbness or weakness.  Her symptoms have been pretty constant.  She arrives via EMS.  She also reports an infection to her lower extremities and will underwent wound care food dressed her wounds today.        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    Benign essential hypertension 11/14/2012    Cataract     s/p phacoemulsification    Chronic kidney disease (CKD), stage III (moderate) 9/12/2013    Diabetic peripheral neuropathy associated with type 2 diabetes mellitus 11/14/2014    causing right hemiparesis    Gait disorder     Hyperlipidemia     Iritis - Both Eyes 6/10/2013    Kidney stone     Lymphedema     Morbid obesity with BMI of 40.0-44.9, adult 2/18/2015    Nephrolithiasis 4/20/2016    NS (nuclear sclerosis) 4/1/2013    Nuclear sclerosis - Both Eyes 4/29/2013    Preseptal cellulitis - Right Eye 4/29/2013    Proliferative diabetic retinopathy - Both Eyes 4/29/2013    Proliferative diabetic retinopathy, both eyes 4/1/2013    PSC (posterior subcapsular cataract) - Both Eyes 4/29/2013    S/P hernia repair 12/19/2012    TIA (transient ischemic attack) 11/18/2014    Tinea pedis 7/24/2012    Tinea pedis is present on both feet.     Type 2 diabetes mellitus with diabetic polyneuropathy, with long-term current use of insulin 9/18/2015    Type 2  diabetes mellitus with renal manifestations, controlled 2013    Type 2 diabetes, controlled, with moderate nonproliferative diabetic retinopathy without macular edema 2015    Ulcer of left lower extremity, limited to breakdown of skin 2015    Unspecified cerebral artery occlusion with cerebral infarction 2014    Unspecified venous (peripheral) insufficiency     Ureteral stone with hydronephrosis 2016    UTI (lower urinary tract infection)     Vaginal infection     Vertical heterotropia - Both Eyes 2013     Past Surgical History:   Procedure Laterality Date    APPENDECTOMY      CATARACT EXTRACTION W/  INTRAOCULAR LENS IMPLANT Left 2013    CATARACT EXTRACTION W/  INTRAOCULAR LENS IMPLANT Right 2013    CHOLECYSTECTOMY      COLONOSCOPY  2005    normal    ESOPHAGOGASTRODUODENOSCOPY  2015    hiatal hernia, Schatzki ring    EYE SURGERY Bilateral     laser surgery both eyes    NASAL SEPTUM SURGERY      SUBTOTAL COLECTOMY  2012    transverse colon, for incarcerated umbilical hernia, Dr. Kat Bower     Family History   Problem Relation Age of Onset    Diabetes Sister     Cataracts Sister     Heart disease Brother     Cataracts Brother     Leukemia Mother     Cancer Neg Hx     Amblyopia Neg Hx     Blindness Neg Hx     Glaucoma Neg Hx     Hypertension Neg Hx     Macular degeneration Neg Hx     Retinal detachment Neg Hx     Strabismus Neg Hx     Stroke Neg Hx     Thyroid disease Neg Hx     Kidney disease Neg Hx      Social History     Tobacco Use    Smoking status: Former Smoker     Packs/day: 0.50     Years: 15.00     Pack years: 7.50     Types: Cigarettes     Quit date: 1982     Years since quittin.5    Smokeless tobacco: Former User    Tobacco comment: smoked one pack per week   Substance Use Topics    Alcohol use: No    Drug use: No     Review of Systems   Constitutional: Positive for appetite change and  chills. Negative for fever.   HENT: Negative for sore throat.    Respiratory: Negative for cough and shortness of breath.    Cardiovascular: Negative for chest pain.   Gastrointestinal: Positive for abdominal pain. Negative for nausea and vomiting.   Genitourinary: Positive for dysuria and flank pain. Negative for frequency and urgency.   Musculoskeletal: Positive for back pain. Negative for neck pain and neck stiffness.   Skin: Negative for rash and wound.   Neurological: Negative for syncope and weakness.   Hematological: Does not bruise/bleed easily.   Psychiatric/Behavioral: Negative for agitation, behavioral problems and confusion.       Physical Exam     Initial Vitals [01/17/22 2023]   BP Pulse Resp Temp SpO2   (!) 157/73 76 16 97.4 °F (36.3 °C) 100 %      MAP       --         Physical Exam    Constitutional: No distress.   HENT:   Head: Normocephalic and atraumatic.   Eyes: Conjunctivae are normal.   Cardiovascular: Intact distal pulses.   Pulmonary/Chest: No respiratory distress.   Abdominal:   Diffuse abdominal tenderness.  No rebound or guarding.  Right CVA tenderness.  Right SI joint tenderness   Musculoskeletal:      Comments: Lower extremities and Camilo hose.  Erythema noted below Camilo hose with some venous stasis changes.  Patient declined examination of her lower extremities as she has just come from wound care.         Neurological: She is alert.   Skin: Skin is warm and dry.   Psychiatric: She has a normal mood and affect.         ED Course   Procedures  Labs Reviewed   CBC W/ AUTO DIFFERENTIAL - Abnormal; Notable for the following components:       Result Value    Hemoglobin 11.6 (*)     Hematocrit 35.2 (*)     All other components within normal limits   COMPREHENSIVE METABOLIC PANEL - Abnormal; Notable for the following components:    CO2 17 (*)     Glucose 181 (*)     BUN 40 (*)     Creatinine 2.2 (*)     Albumin 2.9 (*)     Alkaline Phosphatase 207 (*)     eGFR if  24 (*)     eGFR  if non  21 (*)     All other components within normal limits   URINALYSIS, REFLEX TO URINE CULTURE - Abnormal; Notable for the following components:    Appearance, UA Hazy (*)     Protein, UA 2+ (*)     Glucose, UA 3+ (*)     Occult Blood UA Trace (*)     Leukocytes, UA 3+ (*)     All other components within normal limits    Narrative:     Specimen Source->Urine   URINALYSIS MICROSCOPIC - Abnormal; Notable for the following components:    WBC, UA 7 (*)     Bacteria Many (*)     All other components within normal limits    Narrative:     Specimen Source->Urine   HEMOGLOBIN A1C - Abnormal; Notable for the following components:    Hemoglobin A1C 8.4 (*)     Estimated Avg Glucose 194 (*)     All other components within normal limits   ISTAT PROCEDURE - Abnormal; Notable for the following components:    POC PH 7.322 (*)     POC PCO2 49.0 (*)     POC PO2 19 (*)     POC SATURATED O2 26 (*)     All other components within normal limits   POCT GLUCOSE - Abnormal; Notable for the following components:    POCT Glucose 143 (*)     All other components within normal limits   POCT GLUCOSE - Abnormal; Notable for the following components:    POCT Glucose 166 (*)     All other components within normal limits   HEMOGLOBIN A1C   B-TYPE NATRIURETIC PEPTIDE   SARS-COV-2 RNA AMPLIFICATION, QUAL   SARS-COV-2 RDRP GENE    Narrative:     This test utilizes isothermal nucleic acid amplification   technology to detect the SARS-CoV-2 RdRp nucleic acid segment.   The analytical sensitivity (limit of detection) is 125 genome   equivalents/mL.   A POSITIVE result implies infection with the SARS-CoV-2 virus;   the patient is presumed to be contagious.     A NEGATIVE result means that SARS-CoV-2 nucleic acids are not   present above the limit of detection. A NEGATIVE result should be   treated as presumptive. It does not rule out the possibility of   COVID-19 and should not be the sole basis for treatment decisions.   If COVID-19  is strongly suspected based on clinical and exposure   history, re-testing using an alternate molecular assay should be   considered.   This test is only for use under the Food and Drug   Administration s Emergency Use Authorization (EUA).   Commercial kits are provided by [employer].   Performance characteristics of the EUA have been independently   verified by [facility] Department of   Pathology and Laboratory Medicine.   _________________________________________________________________   The authorized Fact Sheet for Healthcare Providers and the authorized Fact   Sheet for Patients of the ID NOW COVID-19 are available on the FDA   website:     https://www.fda.gov/media/178954/download  https://www.fda.gov/media/734041/download         POCT GLUCOSE, HAND-HELD DEVICE   POCT GLUCOSE, HAND-HELD DEVICE          Imaging Results          CT Abdomen Pelvis  Without Contrast (Final result)  Result time 01/18/22 02:05:58    Final result by Jose Myers MD (01/18/22 02:05:58)                 Impression:      There is urinary bladder wall thickening and perivesicular haziness, correlation for UTI/cystitis is needed.    There is mild prominence of the middle pole calyx of the left kidney, without additional evidence for hydronephrosis, and without evidence for ureteral calculus or obstructive uropathy otherwise seen, this may relate to sequelae of a recently passed calculus, may relate to variant anatomy, correlation for UTI/pyelonephritis is needed, note is made there does not appear to be significant perinephric inflammatory change.    Cystic lesion of the right adnexa again noted, stable appearance, clinical and historical correlation is needed.    Bilateral adrenal nodules, likely represent adrenal adenomas.    Additional findings noted as above.      Electronically signed by: Jose Myers  Date:    01/18/2022  Time:    02:05             Narrative:    EXAMINATION:  CT ABDOMEN PELVIS WITHOUT  CONTRAST    CLINICAL HISTORY:  Abdominal abscess/infection suspected;low back pain/ abdominal pain x 1 week. TTP to  L CVA/ L SI Joint;    TECHNIQUE:  Low dose axial images, sagittal and coronal reformations were obtained from the lung bases to the pubic symphysis.  Intravenous contrast and oral contrast was not utilized, this diminishes the sensitivity of the examination.    COMPARISON:  CT examination of the abdomen and pelvis without contrast May 18, 2021    FINDINGS:  The lung bases demonstrate mild motion artifact and atelectatic change.  The stomach is decompressed, nonspecific appearance.  The gallbladder is not identified, correlation for prior cholecystectomy is needed.  There is no evidence for peripancreatic inflammatory change and no abnormal pancreatic or biliary ductal dilatation.  When accounting for limitations of the exam there is no evidence for acute process of the liver or spleen.  Calcification of the spleen is noted, likely granulomatous.    There is a small right adrenal nodule measuring approximately 9.6 Hounsfield units, likely adrenal adenoma.  There is thickening of the left adrenal gland, with adrenal nodule again noted, measuring approximately 9.8 Hounsfield units, likely adrenal adenoma.    There is appearance of mild prominence of the mid pole calyx of the left kidney, this appearance is nonspecific, and there is no additional evidence for hydronephrosis.  This could represent variant anatomy, although could represent sequelae of a recently passed calculus.  There is no evidence for left-sided hydroureter or ureteral calculus.  On the right there is no evidence for hydroureter, hydronephrosis or ureteral calculus.  Alternatively correlation for UTI/pyelonephritis is needed.  Significant perinephric inflammatory change however is not seen.  There is urinary bladder wall thickening with minimal perivesicular haziness, correlation for UTI/cystitis is needed.  There appears to be a small  urinary bladder diverticulum posteriorly on the right near the ureterovesicular junction.    The arterial vascular structures demonstrate atherosclerotic change, including the abdominal aorta, otherwise not optimally evaluated on this noncontrast exam.  Mild calcification/mineralization associated with the uterus noted.  There is a hypodense lesion at the right adnexa measuring approximately 7.6 Hounsfield units, measuring approximately 4.5 cm, consistent with a right adnexal cystic lesion, this is diminished in size when compared to the prior study at which time it measured approximately 5.4 cm.  Clinical and historical correlation is otherwise needed.    There is postoperative change of the anterior abdominal wall noted.  There is postoperative change of the bowel noted.  There is no evidence for small bowel obstructive process.  The appendix is not identified.  There is no evidence for inflammatory or obstructive process of the colon.  There is no evidence for free intraperitoneal air.  There are prominent groin/inguinal lymph nodes bilaterally for which clinical and historical correlation is needed.  The visualized osseous structures appear intact, there is diminished mineralization and degenerative change, prominent chronic appearing endplate change at the inferior endplate of L1 is noted, there is grade 1 anterolisthesis of L4 with respect L5 noted.                                 Medications   sodium chloride 0.9% flush 10 mL (10 mLs Intravenous Given 1/18/22 9528)   polyethylene glycol packet 17 g (has no administration in time range)   glucose chewable tablet 16 g (has no administration in time range)   glucose chewable tablet 24 g (has no administration in time range)   dextrose 50% injection 12.5 g (has no administration in time range)   dextrose 50% injection 25 g (has no administration in time range)   glucagon (human recombinant) injection 1 mg (has no administration in time range)    albuterol-ipratropium 2.5 mg-0.5 mg/3 mL nebulizer solution 3 mL (has no administration in time range)   acetaminophen tablet 650 mg (has no administration in time range)   melatonin tablet 6 mg (has no administration in time range)   naloxone 0.4 mg/mL injection 0.02 mg (has no administration in time range)   ondansetron injection 4 mg (has no administration in time range)   insulin aspart U-100 pen 0-5 Units (0 Units Subcutaneous Not Given 1/18/22 1200)   heparin (porcine) injection 5,000 Units (5,000 Units Subcutaneous Given 1/18/22 1557)   HYDROcodone-acetaminophen 5-325 mg per tablet 1 tablet (has no administration in time range)   aspirin chewable tablet 81 mg (81 mg Oral Given 1/18/22 0928)   atorvastatin tablet 40 mg (has no administration in time range)   clopidogreL tablet 75 mg (75 mg Oral Given 1/18/22 0927)   NIFEdipine 24 hr tablet 60 mg (60 mg Oral Given 1/18/22 0928)   cefTRIAXone (ROCEPHIN) 1 g/50 mL D5W IVPB (has no administration in time range)   insulin detemir U-100 pen 10 Units (has no administration in time range)   miconazole NITRATE 2 % top powder ( Topical (Top) Given 1/18/22 1707)   HYDROcodone-acetaminophen 5-325 mg per tablet 2 tablet (2 tablets Oral Given 1/18/22 0223)   cefTRIAXone (ROCEPHIN) 1 g/50 mL D5W IVPB (0 g Intravenous Stopped 1/18/22 0521)   lactated ringers bolus 500 mL (0 mLs Intravenous Stopped 1/18/22 0659)     Medical Decision Making:   Differential Diagnosis:   Differential Diagnosis includes, but is not limited to:  Cauda equina syndrome, diskitis/osteomyelitis, epidural/paraspinal abscess, AAA, aortic dissection, post-op/hardware infection, trauma/vertebral fracture, spinal cord injury, disc herniation, spinal stenosis, sciatica, radiculopathy, neoplasm, lumbar muscle strain, muscle spasm, neuropathic pain, UTI/pyelonephritis, nephrolithiasis.    ED Management:  CT demonstrates findings concerning for pyelonephritis, labs with mild LOREN.  In light of patient's  multiple comorbidities history of MDRO and treatment with doxycycline I feel she would benefit from IV antibiotics and further evaluation and treatment of her back pain.  Discussed with [facility] will admit patient to their service.             ED Course as of 01/18/22 1820 Tue Jan 18, 2022   0118 Patient appears in no apparent distress at this time.  Pending lab work and CT imaging. [RN]      ED Course User Index  [RN] Yoel Freeman Jr., MD             Clinical Impression:   Final diagnoses:  [M54.9] Back pain, unspecified back location, unspecified back pain laterality, unspecified chronicity          ED Disposition Condition    Observation               Portions of this note were dictated using voice recognition software and may contain dictation related errors in spelling/grammar/syntax not found on text review       Yoel Freeman Jr., MD  01/18/22 1821

## 2022-01-18 NOTE — CONSULTS
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    Benign essential hypertension 11/14/2012    Cataract     s/p phacoemulsification    Chronic kidney disease (CKD), stage III (moderate) 9/12/2013    Diabetic peripheral neuropathy associated with type 2 diabetes mellitus 11/14/2014    causing right hemiparesis    Gait disorder     Hyperlipidemia     Iritis - Both Eyes 6/10/2013    Kidney stone     Lymphedema     Morbid obesity with BMI of 40.0-44.9, adult 2/18/2015    Nephrolithiasis 4/20/2016    NS (nuclear sclerosis) 4/1/2013    Nuclear sclerosis - Both Eyes 4/29/2013    Preseptal cellulitis - Right Eye 4/29/2013    Proliferative diabetic retinopathy - Both Eyes 4/29/2013    Proliferative diabetic retinopathy, both eyes 4/1/2013    PSC (posterior subcapsular cataract) - Both Eyes 4/29/2013    S/P hernia repair 12/19/2012    TIA (transient ischemic attack) 11/18/2014    Tinea pedis 7/24/2012    Tinea pedis is present on both feet.     Type 2 diabetes mellitus with diabetic polyneuropathy, with long-term current use of insulin 9/18/2015    Type 2 diabetes mellitus with renal manifestations, controlled 12/12/2013    Type 2 diabetes, controlled, with moderate nonproliferative diabetic retinopathy without macular edema 9/17/2015    Ulcer of left lower extremity, limited to breakdown of skin 7/8/2015    Unspecified cerebral artery occlusion with cerebral infarction 11/16/2014    Unspecified venous (peripheral) insufficiency     Ureteral stone with hydronephrosis 1/27/2016    UTI (lower urinary tract infection)     Vaginal infection     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      Past Surgical History:   Procedure Laterality Date    APPENDECTOMY      CATARACT EXTRACTION W/  INTRAOCULAR LENS IMPLANT Left 5/21/2013    CATARACT EXTRACTION W/  INTRAOCULAR LENS IMPLANT Right 6/4/2013     "CHOLECYSTECTOMY      COLONOSCOPY  12/22/2005    normal    ESOPHAGOGASTRODUODENOSCOPY  12/21/2015    hiatal hernia, Schatzki ring    EYE SURGERY Bilateral 2008    laser surgery both eyes    NASAL SEPTUM SURGERY      SUBTOTAL COLECTOMY  12/13/2012    transverse colon, for incarcerated umbilical hernia, Dr. Kat Bower      Review of patient's allergies indicates:   Allergen Reactions    Penicillins Hives     Other reaction(s): Hives  Patient has received cefdinir, ceftriaxone, cefazolin and cefepime in the past with no documented reactions    Sulfa (sulfonamide antibiotics) Other (See Comments)     Shakes, pt states her doctor told her the shakes were possibly caused by an allergy to sulfa      Medications Prior to Admission   Medication Sig Dispense Refill Last Dose    atorvastatin (LIPITOR) 40 MG tablet TAKE 1 TABLET EVERY EVENING (Patient taking differently: Take 40 mg by mouth every evening.) 90 tablet 3     clopidogreL (PLAVIX) 75 mg tablet Take 1 tablet (75 mg total) by mouth once daily. 90 tablet 3     insulin glargine (LANTUS U-100 INSULIN) 100 unit/mL injection INJECT 30-35 UNITS SUBCUTANEOUSLY IN THE EVENING DEPENDING ON SCALE (DISCARD EACH VIAL AFTER 28 DAYS) (Patient taking differently: Inject 30-35 Units into the skin every evening. DEPENDING ON SCALE (DISCARD EACH VIAL AFTER 28 DAYS)) 30 mL 3     NIFEdipine (PROCARDIA-XL) 60 MG (OSM) 24 hr tablet Take 1 tablet (60 mg total) by mouth once daily. 90 tablet 3     acetaminophen (TYLENOL) 325 MG tablet Take 2 tablets (650 mg total) by mouth every 6 (six) hours as needed for Pain.  0 Unknown at Unknown time    alcohol swabs (BD ALCOHOL SWABS) PadM Apply 1 each topically 2 (two) times daily. 180 each 2     aspirin 81 MG Chew Take 81 mg by mouth once daily.   Unknown at Unknown time    BD INSULIN SYRINGE ULTRA-FINE 0.5 mL 30 gauge x 1/2" Syrg USE TO INJECT EVERY NIGHT 90 each 3     blood glucose control, high (TRUE METRIX LEVEL 3) Soln " "Used to calibrate weekly 1 each 3     blood sugar diagnostic (TRUE METRIX GLUCOSE TEST STRIP) Strp Test twice daily 200 strip 3     doxycycline (VIBRA-TABS) 100 MG tablet Take 1 tablet (100 mg total) by mouth 2 (two) times daily. 20 tablet 0 Unknown at Unknown time    gentamicin (GARAMYCIN) 0.3 % ophthalmic solution Place 1 drop into both eyes 3 (three) times daily. 15 mL 0 Unknown at Unknown time    insulin syr/ndl U100 half yesenia (DROPLET INSULIN SYR HALF UNIT) 0.5 mL 30 gauge x 1/2" Syrg USE TO INJECT EVERY NIGHT 100 Syringe 4     ketoconazole (NIZORAL) 2 % cream Apply topically once daily. (Patient not taking: Reported on 2022) 1 Tube 3 Not Taking at Unknown time    lancets (TRUEPLUS LANCETS) 33 gauge Misc 1 lancet by Misc.(Non-Drug; Combo Route) route 2 (two) times a day. 200 each 0     miconazole nitrate 2% (MICOTIN) 2 % Oint Apply topically 2 (two) times daily.  0 Unknown at Unknown time    triamcinolone acetonide 0.1% (KENALOG) 0.1 % cream Apply topically 2 (two) times daily. (Patient not taking: Reported on 2022) 1 Tube 3 Not Taking at Unknown time       Social History     Socioeconomic History    Marital status:    Tobacco Use    Smoking status: Former Smoker     Packs/day: 0.50     Years: 15.00     Pack years: 7.50     Types: Cigarettes     Quit date: 1982     Years since quittin.5    Smokeless tobacco: Former User    Tobacco comment: smoked one pack per week   Substance and Sexual Activity    Alcohol use: No    Drug use: No    Sexual activity: Not Currently   Social History Narrative    Lives alone. Ambulatory via motorized wheelchair. Catches bus to come to [facility].         MEDS   aspirin  81 mg Oral Daily    atorvastatin  40 mg Oral QHS    [START ON 2022] cefTRIAXone (ROCEPHIN) IVPB  1 g Intravenous Q24H    clopidogreL  75 mg Oral Daily    heparin (porcine)  5,000 Units Subcutaneous Q8H    insulin detemir U-100  10 Units Subcutaneous QHS    miconazole " NITRATE 2 %   Topical (Top) BID    NIFEdipine  60 mg Oral Daily    sodium chloride 0.9%  10 mL Intravenous Q8H           CONTINOUS INFUSIONS:      Intake/Output Summary (Last 24 hours) at 1/18/2022 1608  Last data filed at 1/18/2022 0521  Gross per 24 hour   Intake 50 ml   Output --   Net 50 ml        HEMODYNAMICS:    Temp:  [96.3 °F (35.7 °C)-98.3 °F (36.8 °C)] 96.3 °F (35.7 °C)  Pulse:  [72-80] 75  Resp:  [16-19] 17  SpO2:  [99 %-100 %] 100 %  BP: (149-163)/(70-78) 159/73   General :   Weakness   Back pain  Decrease intake  Nausea   Diarrhea  Frequent urination  Feeling cold    No fever  No chills  No CP   No SOB   No cough  Cardiology : pulse 72  Pulmonary : diminished breath sounds  Abdomen : soft   Extremeties : edema   Skin: dry     LABS   Lab Results   Component Value Date    WBC 4.89 01/18/2022    HGB 11.6 (L) 01/18/2022    HCT 35.2 (L) 01/18/2022    MCV 86 01/18/2022     01/18/2022        Recent Labs   Lab 01/18/22  0215   *   CALCIUM 9.1   ALBUMIN 2.9*   PROT 7.2      K 4.9   CO2 17*      BUN 40*   CREATININE 2.2*   ALKPHOS 207*   ALT 15   AST 17   BILITOT 0.6      Lab Results   Component Value Date    .1 (H) 11/12/2021    CALCIUM 9.1 01/18/2022    CAION 1.22 12/15/2012    PHOS 4.5 11/12/2021      Lab Results   Component Value Date    IRON 44 04/16/2021    TIBC 318 04/16/2021    FERRITIN 149 04/16/2021        ABG  Recent Labs   Lab 01/18/22  1158   PH 7.322*   PO2 19*   PCO2 49.0*   HCO3 25.4   BE -1         IMAGING:  CXR    ASSESSMENT / PLAN  LOREN/ CKD 4  Diabetes Mellitus  Hb A1c 8.4  Probably Diabetic Nephropathy  Proteinuria nephrotic range 3 g - 11/22/21  UA protein 2+, RBC 2, glucose 3+, WBC 7.  2019  Sonographic findings compatible with chronic medical renal disease.  No hydronephrosis.  GFR 21 cc/min  Creatinine 2.2  BUN 40  K 4.9  Acidemia  Metabolic acidosis  Respiratory acidosis  Metabolic bone disease  Secondary hyperparathyroidism  Poor nutrition   Albumin  2.9  Anemia multifactorial Hb 11.6  Echo  · Mild left atrial enlargement.  · There is left ventricular eccentric hypertrophy.  · The left ventricle is normal in size with normal systolic function. The estimated ejection fraction is 60%.  · Indeterminate diastolic function.  · Normal right ventricular size with normal right ventricular systolic function.  · The estimated PA systolic pressure is 31 mmHg.  · Normal central venous pressure (3 mmHg).   Weight daily   I and O  Avoid nephrotoxic agents, hypotension, hypovolemia      Renal, ADA diet      Wound care       PT

## 2022-01-18 NOTE — ASSESSMENT & PLAN NOTE
Venous ulcer of both lower extremities with varicose veins  Venous insufficiency of both lower extremities  Camilo brandon present, some erythema is noted, venous stasis skin changes noted  Patient reports having recent skin infection, wound care came yesterday and does not wish to unwrap legs for assessment  -Wound care consult

## 2022-01-18 NOTE — H&P
[facility]  [facility] Medicine  History & Physical    Patient Name: Sherin Ortiz  MRN: [id number]  Patient Class: OP- Observation  Admission Date: 1/18/2022  Attending Physician: Su Maddox*   Primary Care Provider: Deedee Calderon MD         Patient information was obtained from patient, past medical records and ER records.     Subjective:     Principal Problem:Pyelonephritis    Chief Complaint:   Chief Complaint   Patient presents with    Back Pain     Reports lower back pain and loss of appetite with generalized weakness and feeling of nervousness. Pt reports current use of abx for UTI        HPI: Sherin Ortiz is a 77 y.o. 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), Benign essential hypertension (11/14/2012), Cataract, Chronic kidney disease (CKD), stage III (moderate) (9/12/2013), Diabetic peripheral neuropathy associated with type 2 diabetes mellitus (11/14/2014), Gait disorder, Hyperlipidemia, Iritis - Both Eyes (6/10/2013), Kidney stone, Lymphedema, Morbid obesity with BMI of 40.0-44.9, adult (2/18/2015), Nephrolithiasis (4/20/2016), NS (nuclear sclerosis) (4/1/2013), Nuclear sclerosis - Both Eyes (4/29/2013), Preseptal cellulitis - Right Eye (4/29/2013), Proliferative diabetic retinopathy - Both Eyes (4/29/2013), Proliferative diabetic retinopathy, both eyes (4/1/2013), PSC (posterior subcapsular cataract) - Both Eyes (4/29/2013), S/P hernia repair (12/19/2012), TIA (transient ischemic attack) (11/18/2014), Tinea pedis (7/24/2012), Type 2 diabetes mellitus with diabetic polyneuropathy, with long-term current use of insulin (9/18/2015), Type 2 diabetes mellitus with renal manifestations, controlled (12/12/2013), Type 2 diabetes, controlled, with moderate nonproliferative diabetic retinopathy without macular edema (9/17/2015), Ulcer of left lower extremity, limited to breakdown of skin (7/8/2015), Unspecified cerebral artery  occlusion with cerebral infarction (11/16/2014), Unspecified venous (peripheral) insufficiency, Ureteral stone with hydronephrosis (1/27/2016), UTI (lower urinary tract infection), Vaginal infection, and 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     The patient presented to the ED due to low back pain, dysuria, abdominal pain, and decreased appetite. When re interviewed she states she never has symptoms such as dysuria or frequency when she has UTIs so that is why she never knows when she has an infection. Patient's other symptoms have been going on approximately 1 week.  On January 14, 2022 patient was prescribed doxycycline for presumed UTI.  She reports persistent pain since then.  She has not tried taking any pain medication. Denies history of trauma incontinence numbness or weakness. She also reports an infection to her lower extremities and will underwent wound care food dressed her wounds today. In ED labs remarkable for UTI on UA and LOREN on CKD. There is urinary bladder wall thickening and perivesicular haziness, correlation for UTI/cystitis is needed. CT abd/pelvis:There is mild prominence of the middle pole calyx of the left kidney, without additional evidence for hydronephrosis, and without evidence for ureteral calculus or obstructive uropathy otherwise seen, this may relate to sequelae of a recently passed calculus, may relate to variant anatomy, correlation for UTI/pyelonephritis is needed, note is made there does not appear to be significant perinephric inflammatory change. Cystic lesion of the right adnexa again noted, stable appearance, clinical and historical correlation is needed. Bilateral adrenal nodules, likely represent adrenal adenomas. Started on Rocephin. Admitted to [facility] for further care.      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    Benign essential hypertension 11/14/2012    Cataract     s/p  phacoemulsification    Chronic kidney disease (CKD), stage III (moderate) 9/12/2013    Diabetic peripheral neuropathy associated with type 2 diabetes mellitus 11/14/2014    causing right hemiparesis    Gait disorder     Hyperlipidemia     Iritis - Both Eyes 6/10/2013    Kidney stone     Lymphedema     Morbid obesity with BMI of 40.0-44.9, adult 2/18/2015    Nephrolithiasis 4/20/2016    NS (nuclear sclerosis) 4/1/2013    Nuclear sclerosis - Both Eyes 4/29/2013    Preseptal cellulitis - Right Eye 4/29/2013    Proliferative diabetic retinopathy - Both Eyes 4/29/2013    Proliferative diabetic retinopathy, both eyes 4/1/2013    PSC (posterior subcapsular cataract) - Both Eyes 4/29/2013    S/P hernia repair 12/19/2012    TIA (transient ischemic attack) 11/18/2014    Tinea pedis 7/24/2012    Tinea pedis is present on both feet.     Type 2 diabetes mellitus with diabetic polyneuropathy, with long-term current use of insulin 9/18/2015    Type 2 diabetes mellitus with renal manifestations, controlled 12/12/2013    Type 2 diabetes, controlled, with moderate nonproliferative diabetic retinopathy without macular edema 9/17/2015    Ulcer of left lower extremity, limited to breakdown of skin 7/8/2015    Unspecified cerebral artery occlusion with cerebral infarction 11/16/2014    Unspecified venous (peripheral) insufficiency     Ureteral stone with hydronephrosis 1/27/2016    UTI (lower urinary tract infection)     Vaginal infection     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       Past Surgical History:   Procedure Laterality Date    APPENDECTOMY      CATARACT EXTRACTION W/  INTRAOCULAR LENS IMPLANT Left 5/21/2013    CATARACT EXTRACTION W/  INTRAOCULAR LENS IMPLANT Right 6/4/2013    CHOLECYSTECTOMY      COLONOSCOPY  12/22/2005    normal    ESOPHAGOGASTRODUODENOSCOPY  12/21/2015    hiatal hernia, Schatzki ring    EYE SURGERY Bilateral 2008    laser surgery both eyes    NASAL SEPTUM SURGERY    "   SUBTOTAL COLECTOMY  12/13/2012    transverse colon, for incarcerated umbilical hernia, Dr. Kat Bower       Review of patient's allergies indicates:   Allergen Reactions    Penicillins Hives     Other reaction(s): Hives  Patient has received cefdinir, ceftriaxone, cefazolin and cefepime in the past with no documented reactions    Sulfa (sulfonamide antibiotics) Other (See Comments)     Shakes, pt states her doctor told her the shakes were possibly caused by an allergy to sulfa       No current facility-administered medications on file prior to encounter.     Current Outpatient Medications on File Prior to Encounter   Medication Sig    acetaminophen (TYLENOL) 325 MG tablet Take 2 tablets (650 mg total) by mouth every 6 (six) hours as needed for Pain.    alcohol swabs (BD ALCOHOL SWABS) PadM Apply 1 each topically 2 (two) times daily.    aspirin 81 MG Chew Take 81 mg by mouth once daily.    atorvastatin (LIPITOR) 40 MG tablet TAKE 1 TABLET EVERY EVENING    BD INSULIN SYRINGE ULTRA-FINE 0.5 mL 30 gauge x 1/2" Syrg USE TO INJECT EVERY NIGHT    blood glucose control, high (TRUE METRIX LEVEL 3) Soln Used to calibrate weekly    blood sugar diagnostic (TRUE METRIX GLUCOSE TEST STRIP) Strp Test twice daily    blood-glucose meter (TRUE METRIX GLUCOSE METER) kit Use as directed to test glucose (Patient not taking: Reported on 12/6/2021)    blood-glucose meter Misc Dispense one meter: Insurance Brand Preference Accu-Chek (Patient not taking: Reported on 12/6/2021)    clopidogreL (PLAVIX) 75 mg tablet Take 1 tablet (75 mg total) by mouth once daily.    doxycycline (VIBRA-TABS) 100 MG tablet Take 1 tablet (100 mg total) by mouth 2 (two) times daily.    gentamicin (GARAMYCIN) 0.3 % ophthalmic solution Place 1 drop into both eyes 3 (three) times daily.    insulin glargine (LANTUS U-100 INSULIN) 100 unit/mL injection INJECT 30-35 UNITS SUBCUTANEOUSLY IN THE EVENING DEPENDING ON SCALE (DISCARD EACH VIAL " "AFTER 28 DAYS)    insulin syr/ndl U100 half yesenia (DROPLET INSULIN SYR HALF UNIT) 0.5 mL 30 gauge x 1/2" Syrg USE TO INJECT EVERY NIGHT    ketoconazole (NIZORAL) 2 % cream Apply topically once daily.    lancets (ACCU-CHEK SOFTCLIX LANCETS) Misc Apply 1 lancet topically 2 (two) times a day. (Patient not taking: Reported on 2021)    lancets (TRUEPLUS LANCETS) 33 gauge Misc 1 lancet by Misc.(Non-Drug; Combo Route) route 2 (two) times a day.    miconazole nitrate 2% (MICOTIN) 2 % Oint Apply topically 2 (two) times daily.    NIFEdipine (PROCARDIA-XL) 60 MG (OSM) 24 hr tablet Take 1 tablet (60 mg total) by mouth once daily.    triamcinolone acetonide 0.1% (KENALOG) 0.1 % cream Apply topically 2 (two) times daily.     Family History     Problem Relation (Age of Onset)    Cataracts Sister, Brother    Diabetes Sister    Heart disease Brother    Leukemia Mother        Tobacco Use    Smoking status: Former Smoker     Packs/day: 0.50     Years: 15.00     Pack years: 7.50     Types: Cigarettes     Quit date: 1982     Years since quittin.5    Smokeless tobacco: Former User    Tobacco comment: smoked one pack per week   Substance and Sexual Activity    Alcohol use: No    Drug use: No    Sexual activity: Not Currently     Review of Systems   Constitutional: Positive for appetite change and chills.   HENT: Negative.    Respiratory: Negative.    Cardiovascular: Negative.    Gastrointestinal: Positive for abdominal pain. Negative for abdominal distention, diarrhea, nausea and vomiting.   Endocrine: Negative.    Genitourinary: Positive for flank pain. Negative for dysuria.   Musculoskeletal: Positive for back pain.   Neurological: Negative.      Objective:     Vital Signs (Most Recent):  Temp: 97.4 °F (36.3 °C) (22)  Pulse: 76 (22)  Resp: 19 (22)  BP: (!) 157/73 (22)  SpO2: 100 % (22) Vital Signs (24h Range):  Temp:  [97.4 °F (36.3 °C)] 97.4 °F (36.3 " "°C)  Pulse:  [76] 76  Resp:  [16-19] 19  SpO2:  [100 %] 100 %  BP: (157)/(73) 157/73     Weight: 113.4 kg (250 lb)  Body mass index is 39.16 kg/m².    Physical Exam  Vitals and nursing note reviewed.   Constitutional:       General: She is not in acute distress.     Appearance: Normal appearance. She is obese. She is not ill-appearing.   HENT:      Head: Normocephalic and atraumatic.      Mouth/Throat:      Mouth: Mucous membranes are dry.   Eyes:      Extraocular Movements: Extraocular movements intact.      Pupils: Pupils are equal, round, and reactive to light.   Cardiovascular:      Rate and Rhythm: Normal rate and regular rhythm.      Pulses: Normal pulses.      Heart sounds: Normal heart sounds. No murmur heard.      Pulmonary:      Effort: Pulmonary effort is normal. No respiratory distress.      Breath sounds: Normal breath sounds. No wheezing.   Abdominal:      General: Bowel sounds are normal. There is no distension.      Palpations: Abdomen is soft. There is no mass.      Tenderness: There is no abdominal tenderness. There is right CVA tenderness. There is no guarding or rebound.   Musculoskeletal:         General: Swelling present. Normal range of motion.      Cervical back: Normal range of motion.      Comments: Camilo taylor noted to BLE "wounds"   Patient states she had wound care today and does not wish to unwrap     Skin:     General: Skin is warm and dry.      Capillary Refill: Capillary refill takes 2 to 3 seconds.      Comments: Venous stasis skin changes noted   Neurological:      General: No focal deficit present.      Mental Status: She is alert and oriented to person, place, and time. Mental status is at baseline.   Psychiatric:         Mood and Affect: Mood normal.         Behavior: Behavior normal.         Thought Content: Thought content normal.         Judgment: Judgment normal.           CRANIAL NERVES     CN III, IV, VI   Pupils are equal, round, and reactive to light.       Significant " Labs:   All pertinent labs within the past 24 hours have been reviewed.  Recent Lab Results       01/18/22  0238   01/18/22  0215        Albumin   2.9       Alkaline Phosphatase   207       ALT   15       Anion Gap   15       Appearance, UA Hazy         AST   17       Bacteria, UA Many         Baso #   0.03       Basophil %   0.6       Bilirubin (UA) Negative         BILIRUBIN TOTAL   0.6  Comment: For infants and newborns, interpretation of results should be based  on gestational age, weight and in agreement with clinical  observations.    Premature Infant recommended reference ranges:  Up to 24 hours.............<8.0 mg/dL  Up to 48 hours............<12.0 mg/dL  3-5 days..................<15.0 mg/dL  6-29 days.................<15.0 mg/dL         BUN   40       Calcium   9.1       Chloride   107       CO2   17       Color, UA Yellow         Creatinine   2.2       Differential Method   Automated       eGFR if    24       eGFR if non    21  Comment: Calculation used to obtain the estimated glomerular filtration  rate (eGFR) is the CKD-EPI equation.          Eos #   0.0       Eosinophil %   0.8       Estimated Avg Glucose   194       Glucose   181       Glucose, UA 3+         Gran # (ANC)   3.1       Gran %   64.2       Hematocrit   35.2       Hemoglobin   11.6       Hemoglobin A1C External   8.4  Comment: ADA Screening Guidelines:  5.7-6.4%  Consistent with prediabetes  >or=6.5%  Consistent with diabetes    High levels of fetal hemoglobin interfere with the HbA1C  assay. Heterozygous hemoglobin variants (HbS, HgC, etc)do  not significantly interfere with this assay.   However, presence of multiple variants may affect accuracy.         Hyaline Casts, UA 1         Immature Grans (Abs)   0.02  Comment: Mild elevation in immature granulocytes is non specific and   can be seen in a variety of conditions including stress response,   acute inflammation, trauma and pregnancy. Correlation with  other   laboratory and clinical findings is essential.         Immature Granulocytes   0.4       Ketones, UA Negative         Leukocytes, UA 3+         Lymph #   1.3       Lymph %   25.6       MCH   28.4       MCHC   33.0       MCV   86       Microscopic Comment SEE COMMENT  Comment: Other formed elements not mentioned in the report are not   present in the microscopic examination.            Mono #   0.4       Mono %   8.4       MPV   9.2       NITRITE UA Negative         nRBC   0       Occult Blood UA Trace         pH, UA 8.0         Platelets   253       Potassium   4.9       PROTEIN TOTAL   7.2       Protein, UA 2+  Comment: Recommend a 24 hour urine protein or a urine   protein/creatinine ratio if globulin induced proteinuria is  clinically suspected.           RBC   4.09       RBC, UA 2         RDW   14.5       Sodium   139       Specific [place], UA 1.015         Specimen UA Urine, Clean Catch         UROBILINOGEN UA Negative         WBC, UA 7         WBC   4.89       Yeast, UA None               Significant Imaging: I have reviewed all pertinent imaging results/findings within the past 24 hours.    Assessment/Plan:     * Pyelonephritis  c/o back pain and decreased appetite lasting approximately 1 week.    History of renal stones with stenting and Ecoli  1/14/22 was prescribed doxycycline for presumed UTI.  She reports persistent pain since then.    UA positive  CT abd/pelvis: There is urinary bladder wall thickening and perivesicular haziness, correlation for UTI/cystitis is needed. There is mild prominence of the middle pole calyx of the left kidney, without additional evidence for hydronephrosis, and without evidence for ureteral calculus or obstructive uropathy otherwise seen, this may relate to sequelae of a recently passed calculus, may relate to variant anatomy, correlation for UTI/pyelonephritis is needed, note is made there does not appear to be significant perinephric inflammatory change. Cystic  lesion of the right adnexa again noted, stable appearance, clinical and historical correlation is needed. Bilateral adrenal nodules, likely represent adrenal adenomas.  -Started on rocephin in ED-continue  -consult nephrology    (HFpEF) heart failure with preserved ejection fraction  Patient is identified as having Diastolic (HFpEF) heart failure that is Chronic. CHF is currently controlled. Latest ECHO performed and demonstrates- Results for orders placed during the hospital encounter of 11/21/20    Echo Color Flow Doppler? Yes    Interpretation Summary  · Mild left atrial enlargement.  · There is left ventricular eccentric hypertrophy.  · The left ventricle is normal in size with normal systolic function. The estimated ejection fraction is 60%.  · Indeterminate diastolic function.  · Normal right ventricular size with normal right ventricular systolic function.  · The estimated PA systolic pressure is 31 mmHg.  · Normal central venous pressure (3 mmHg).  . Continue home meds and monitor clinical status closely. Monitor on telemetry. Patient is off CHF pathway.  Monitor strict Is&Os and daily weights.  Place on fluid restriction of 1.5 L. Continue to stress to patient importance of self efficacy and  on diet for CHF. Last BNP reviewed- and noted below No results for input(s): BNP, BNPTRIAGEBLO in the last 168 hours..          PAOD (peripheral arterial occlusive disease)  DAPT, statin      Aortic atherosclerosis  Continue ASA, statin      Anemia of chronic disease  Stable  monitor      Type 2 diabetes mellitus with diabetic polyneuropathy, with long-term current use of insulin  Hemoglobin A1C   Date Value Ref Range Status   01/18/2022 8.4 (H) 4.0 - 5.6 % Final   -SSI  -detemir 30 units nightly  -accuchecks ACHS  -diabetic diet        Acute renal failure superimposed on stage 4 chronic kidney disease  Baseline Cr 1.6-1.8  -Cr 2.2  -suspect prerenal LOREN in setting of UTI  -given 500 cc LR bolus  -avoid  nephrotoxic meds  -renal dose meds  -avoid events causing decreased renal perfusion       Debility  Wheelchair dependent  Inability to walk  Fall precautions      Essential hypertension  BP on admission  Takes procardia at home  Resume and monitor      Hyperlipidemia LDL goal <100  Resume home meds      Lymphedema of both lower extremities  Venous ulcer of both lower extremities with varicose veins  Venous insufficiency of both lower extremities  Acmilo hose present, some erythema is noted, venous stasis skin changes noted  Patient reports having recent skin infection, wound care came yesterday and does not wish to unwrap legs for assessment  -Wound care consult      VTE Risk Mitigation (From admission, onward)         Ordered     heparin (porcine) injection 5,000 Units  Every 8 hours         01/18/22 0317     IP VTE HIGH RISK PATIENT  Once         01/18/22 0317     Place sequential compression device  Until discontinued         01/18/22 0317                   Lauren Seals NP  Department of Hospital Medicine   [facility] - Emergency Dept

## 2022-01-18 NOTE — ASSESSMENT & PLAN NOTE
Baseline Cr 1.6-1.8  -Cr 2.2  -suspect prerenal LOREN in setting of UTI  -given 500 cc LR bolus  -avoid nephrotoxic meds  -renal dose meds  -avoid events causing decreased renal perfusion

## 2022-01-19 LAB
ALBUMIN/CREAT UR: 860.8 UG/MG (ref 0–30)
ANION GAP SERPL CALC-SCNC: 13 MMOL/L (ref 8–16)
BUN SERPL-MCNC: 40 MG/DL (ref 8–23)
CALCIUM SERPL-MCNC: 8.7 MG/DL (ref 8.7–10.5)
CHLORIDE SERPL-SCNC: 108 MMOL/L (ref 95–110)
CO2 SERPL-SCNC: 19 MMOL/L (ref 23–29)
CREAT SERPL-MCNC: 1.9 MG/DL (ref 0.5–1.4)
CREAT UR-MCNC: 77.6 MG/DL (ref 15–325)
EST. GFR  (AFRICAN AMERICAN): 28 ML/MIN/1.73 M^2
EST. GFR  (NON AFRICAN AMERICAN): 25 ML/MIN/1.73 M^2
GLUCOSE SERPL-MCNC: 164 MG/DL (ref 70–110)
MICROALBUMIN UR DL<=1MG/L-MCNC: 668 UG/ML
POCT GLUCOSE: 162 MG/DL (ref 70–110)
POCT GLUCOSE: 174 MG/DL (ref 70–110)
POCT GLUCOSE: 202 MG/DL (ref 70–110)
POCT GLUCOSE: 234 MG/DL (ref 70–110)
POCT GLUCOSE: 234 MG/DL (ref 70–110)
POTASSIUM SERPL-SCNC: 4.7 MMOL/L (ref 3.5–5.1)
SODIUM SERPL-SCNC: 140 MMOL/L (ref 136–145)

## 2022-01-19 PROCEDURE — 96372 THER/PROPH/DIAG INJ SC/IM: CPT | Mod: 59

## 2022-01-19 PROCEDURE — 86334 IMMUNOFIX E-PHORESIS SERUM: CPT | Mod: HCNC | Performed by: INTERNAL MEDICINE

## 2022-01-19 PROCEDURE — 25000003 PHARM REV CODE 250: Mod: HCNC | Performed by: HOSPITALIST

## 2022-01-19 PROCEDURE — 87040 BLOOD CULTURE FOR BACTERIA: CPT | Mod: HCNC | Performed by: HOSPITALIST

## 2022-01-19 PROCEDURE — C9399 UNCLASSIFIED DRUGS OR BIOLOG: HCPCS | Mod: HCNC | Performed by: HOSPITALIST

## 2022-01-19 PROCEDURE — 25000003 PHARM REV CODE 250: Mod: HCNC | Performed by: NURSE PRACTITIONER

## 2022-01-19 PROCEDURE — 63600175 PHARM REV CODE 636 W HCPCS: Mod: HCNC | Performed by: NURSE PRACTITIONER

## 2022-01-19 PROCEDURE — 94761 N-INVAS EAR/PLS OXIMETRY MLT: CPT | Mod: HCNC

## 2022-01-19 PROCEDURE — 11000001 HC ACUTE MED/SURG PRIVATE ROOM: Mod: HCNC

## 2022-01-19 PROCEDURE — 80048 BASIC METABOLIC PNL TOTAL CA: CPT | Mod: HCNC | Performed by: HOSPITALIST

## 2022-01-19 PROCEDURE — 36415 COLL VENOUS BLD VENIPUNCTURE: CPT | Mod: HCNC | Performed by: INTERNAL MEDICINE

## 2022-01-19 PROCEDURE — 96366 THER/PROPH/DIAG IV INF ADDON: CPT

## 2022-01-19 PROCEDURE — A4216 STERILE WATER/SALINE, 10 ML: HCPCS | Mod: HCNC | Performed by: NURSE PRACTITIONER

## 2022-01-19 PROCEDURE — 99900035 HC TECH TIME PER 15 MIN (STAT): Mod: HCNC

## 2022-01-19 PROCEDURE — 86334 PATHOLOGIST INTERPRETATION IFE: ICD-10-PCS | Mod: 26,HCNC,, | Performed by: PATHOLOGY

## 2022-01-19 PROCEDURE — 63600175 PHARM REV CODE 636 W HCPCS: Mod: HCNC | Performed by: HOSPITALIST

## 2022-01-19 PROCEDURE — 86334 IMMUNOFIX E-PHORESIS SERUM: CPT | Mod: 26,HCNC,, | Performed by: PATHOLOGY

## 2022-01-19 RX ORDER — INSULIN GLARGINE 100 [IU]/ML
10-15 INJECTION, SOLUTION SUBCUTANEOUS NIGHTLY
Start: 2022-01-19 | End: 2022-03-21 | Stop reason: SDUPTHER

## 2022-01-19 RX ORDER — ENALAPRILAT 1.25 MG/ML
0.62 INJECTION INTRAVENOUS ONCE
Status: DISCONTINUED | OUTPATIENT
Start: 2022-01-19 | End: 2022-01-21 | Stop reason: HOSPADM

## 2022-01-19 RX ORDER — CEFPODOXIME PROXETIL 200 MG/1
200 TABLET, FILM COATED ORAL 2 TIMES DAILY
Qty: 26 TABLET | Refills: 0 | Status: SHIPPED | OUTPATIENT
Start: 2022-01-19 | End: 2022-02-11 | Stop reason: SDUPTHER

## 2022-01-19 RX ADMIN — NIFEDIPINE 60 MG: 30 TABLET, FILM COATED, EXTENDED RELEASE ORAL at 08:01

## 2022-01-19 RX ADMIN — CEFTRIAXONE 1 G: 1 INJECTION, SOLUTION INTRAVENOUS at 05:01

## 2022-01-19 RX ADMIN — Medication 10 ML: at 09:01

## 2022-01-19 RX ADMIN — ATORVASTATIN CALCIUM 40 MG: 40 TABLET, FILM COATED ORAL at 08:01

## 2022-01-19 RX ADMIN — Medication 10 ML: at 06:01

## 2022-01-19 RX ADMIN — CLOPIDOGREL 75 MG: 75 TABLET, FILM COATED ORAL at 08:01

## 2022-01-19 RX ADMIN — INSULIN DETEMIR 10 UNITS: 100 INJECTION, SOLUTION SUBCUTANEOUS at 08:01

## 2022-01-19 RX ADMIN — HEPARIN SODIUM 5000 UNITS: 5000 INJECTION INTRAVENOUS; SUBCUTANEOUS at 09:01

## 2022-01-19 RX ADMIN — HEPARIN SODIUM 5000 UNITS: 5000 INJECTION INTRAVENOUS; SUBCUTANEOUS at 02:01

## 2022-01-19 RX ADMIN — Medication 10 ML: at 02:01

## 2022-01-19 RX ADMIN — INSULIN ASPART 2 UNITS: 100 INJECTION, SOLUTION INTRAVENOUS; SUBCUTANEOUS at 06:01

## 2022-01-19 RX ADMIN — VANCOMYCIN HYDROCHLORIDE 2000 MG: 5 INJECTION, POWDER, LYOPHILIZED, FOR SOLUTION INTRAVENOUS at 08:01

## 2022-01-19 RX ADMIN — MICONAZOLE NITRATE 2 % TOPICAL POWDER: at 08:01

## 2022-01-19 RX ADMIN — INSULIN ASPART 1 UNITS: 100 INJECTION, SOLUTION INTRAVENOUS; SUBCUTANEOUS at 08:01

## 2022-01-19 RX ADMIN — HEPARIN SODIUM 5000 UNITS: 5000 INJECTION INTRAVENOUS; SUBCUTANEOUS at 05:01

## 2022-01-19 RX ADMIN — ASPIRIN 81 MG CHEWABLE TABLET 81 MG: 81 TABLET CHEWABLE at 08:01

## 2022-01-19 NOTE — PLAN OF CARE
Pt. AAOx4. No c/o N/V. Incontinence care completed, antifungal powder to perineal area. PW replaced. VSS. BS monitored. Bed alarm on and call light in reach. Pt. Instructed to call for assistance.    Problem: Adult Inpatient Plan of Care  Goal: Plan of Care Review  Outcome: Ongoing, Progressing  Goal: Patient-Specific Goal (Individualized)  Outcome: Ongoing, Progressing  Goal: Absence of Hospital-Acquired Illness or Injury  Outcome: Ongoing, Progressing  Goal: Optimal Comfort and Wellbeing  Outcome: Ongoing, Progressing  Goal: Readiness for Transition of Care  Outcome: Ongoing, Progressing     Problem: Diabetes Comorbidity  Goal: Blood Glucose Level Within Targeted Range  Outcome: Ongoing, Progressing     Problem: Fluid and Electrolyte Imbalance (Acute Kidney Injury/Impairment)  Goal: Fluid and Electrolyte Balance  Outcome: Ongoing, Progressing     Problem: Oral Intake Inadequate (Acute Kidney Injury/Impairment)  Goal: Optimal Nutrition Intake  Outcome: Ongoing, Progressing     Problem: Renal Function Impairment (Acute Kidney Injury/Impairment)  Goal: Effective Renal Function  Outcome: Ongoing, Progressing     Problem: Impaired Wound Healing  Goal: Optimal Wound Healing  Outcome: Ongoing, Progressing     Problem: Fall Injury Risk  Goal: Absence of Fall and Fall-Related Injury  Outcome: Ongoing, Progressing     Problem: Skin Injury Risk Increased  Goal: Skin Health and Integrity  Outcome: Ongoing, Progressing

## 2022-01-19 NOTE — SUBJECTIVE & OBJECTIVE
Interval History:  Doing well today.  Reports the back pain has improved.  No nausea or vomiting.  Anticipated discharge on oral antibiotics based off previous urine cultures today; however, patient's admission blood cultures now growing bacteria, possibly contaminant.  Will continue on IV antibiotics and monitor for culture results.    Review of Systems   Constitutional: Negative for fever.   Respiratory: Negative for shortness of breath.    Cardiovascular: Negative for chest pain.   Genitourinary: Positive for flank pain (Improved).     Objective:     Vital Signs (Most Recent):  Temp: 97.5 °F (36.4 °C) (01/19/22 1159)  Pulse: 71 (01/19/22 1159)  Resp: 18 (01/19/22 1159)  BP: (!) 182/93 (01/19/22 1159)  SpO2: 98 % (01/19/22 1222) Vital Signs (24h Range):  Temp:  [96.8 °F (36 °C)-97.8 °F (36.6 °C)] 97.5 °F (36.4 °C)  Pulse:  [71-78] 71  Resp:  [18-19] 18  SpO2:  [97 %-99 %] 98 %  BP: (141-198)/(66-93) 182/93     Weight: 113.4 kg (250 lb)  Body mass index is 39.16 kg/m².    Intake/Output Summary (Last 24 hours) at 1/19/2022 1623  Last data filed at 1/19/2022 1300  Gross per 24 hour   Intake --   Output 2800 ml   Net -2800 ml      Physical Exam  Vitals and nursing note reviewed.   Constitutional:       General: She is not in acute distress.     Appearance: Normal appearance. She is obese. She is not ill-appearing.   HENT:      Head: Normocephalic and atraumatic.      Mouth/Throat:      Mouth: Mucous membranes are dry.   Eyes:      Extraocular Movements: Extraocular movements intact.      Pupils: Pupils are equal, round, and reactive to light.   Cardiovascular:      Rate and Rhythm: Normal rate and regular rhythm.      Pulses: Normal pulses.      Heart sounds: Normal heart sounds. No murmur heard.      Pulmonary:      Effort: Pulmonary effort is normal. No respiratory distress.      Breath sounds: Normal breath sounds. No wheezing.   Abdominal:      General: Bowel sounds are normal. There is no distension.       "Palpations: Abdomen is soft. There is no mass.      Tenderness: There is no abdominal tenderness. There is right CVA tenderness. There is no guarding or rebound.   Musculoskeletal:         General: Swelling present. Normal range of motion.      Cervical back: Normal range of motion.      Comments: Camilo taylor noted to BLE "wounds"   Patient states she had wound care today and does not wish to unwrap     Skin:     General: Skin is warm and dry.      Capillary Refill: Capillary refill takes 2 to 3 seconds.      Comments: Venous stasis skin changes noted   Neurological:      General: No focal deficit present.      Mental Status: She is alert and oriented to person, place, and time. Mental status is at baseline.   Psychiatric:         Mood and Affect: Mood normal.         Behavior: Behavior normal.         Thought Content: Thought content normal.         Judgment: Judgment normal.         Significant Labs: All pertinent labs within the past 24 hours have been reviewed.    Significant Imaging: I have reviewed all pertinent imaging results/findings within the past 24 hours.  "

## 2022-01-19 NOTE — NURSING
Dr. Viera notified of Pt. BP elevated and positive blood cultures. MD stated to re-assess Pt. BP in the afternoon and to refrain from giving PRN medications for BP at the moment. Will continue to monitor.

## 2022-01-19 NOTE — ASSESSMENT & PLAN NOTE
Hemoglobin A1C   Date Value Ref Range Status   01/18/2022 8.4 (H) 4.0 - 5.6 % Final   -SSI  -detemir 10 units nightly  -accuchecks ACHS  -diabetic diet

## 2022-01-19 NOTE — PLAN OF CARE
Pt. AAOx4. Pt. C/o discomfort when rolling in bed. No c/o N/V. Wound care assessed Pt. Prior to arrival to floor. PW placed. UA sent to lab. VSS. BS monitored. Bed alarm on and call light in reach. Pt. Instructed to call for assistance.

## 2022-01-19 NOTE — PLAN OF CARE
[place] met with pt - lives alone but adult son Kannan [phone] lives across the street.   pt's torsten Blanchard  ;  Hayley Velasco         current with [facility];  pt has a power chair, bsc and sc  pt states she wants a sliding board to more easily use her shower chair     dx:  LOREN/CKD 4,  chest pain;    dressing changes at lower extremities per [facility]  and [facility].    pt info sent to [facility] per [facility].          Future Appointments   Date Time Provider Department Center   1/24/2022  8:00 AM Jody Murray NP [facility] NEPHRO [address]   1/27/2022  2:00 PM Gretta Perry DO [facility] WOUND [facility]   2/4/2022  8:45 AM Adrienne Chisholm MD [facility] IMPRI [facility]   2/4/2022 10:00 AM Christian Bishop MD [facility] CARDIO [facility]   3/8/2022  2:30 PM Deedee Calderon MD [facility] IM [address] PCW        01/19/22 1656   Discharge Assessment   Assessment Type Discharge Planning Brief Assessment   Confirmed/corrected address, phone number and insurance Yes   Confirmed Demographics Correct on Facesheet   Source of Information patient;health record   Does patient/caregiver understand observation status Yes   Communicated GABE with patient/caregiver Yes   Reason For Admission Chest pain, back pain, ARF   Lives With alone   Do you expect to return to your current living situation? Yes   Do you have help at home or someone to help you manage your care at home? Yes   Who are your caregiver(s) and their phone number(s)? nerissa Espinal [phone];  torsten Blanchard  ;  Hayley Velasco    Prior to hospitilization cognitive status: Alert/Oriented   Current cognitive status: Alert/Oriented   Walking or Climbing Stairs Difficulty transferring difficulty, dependent;other (see comments)  (pt doesn't walk)   Dressing/Bathing Difficulty none   Home Accessibility wheelchair accessible   Home Layout Able to live on 1st floor   Equipment Currently Used at Home  bedside commode;shower chair;power chair   Readmission within 30 days? No   Patient currently being followed by outpatient case management? No   Do you currently have service(s) that help you manage your care at home? No  (current with [facility])   Do you take prescription medications? Yes  ([facility] - [address] and JADA Hudson)   Do you have any problems affording any of your prescribed medications? No   Is the patient taking medications as prescribed? yes   Who is going to help you get home at discharge? family - son or daughters   How do you get to doctors appointments? family or friend will provide   Are you on dialysis? No   Do you take coumadin? No   Discharge Plan A Home;Home with family;[facility]

## 2022-01-19 NOTE — CONSULTS
Thank you for your consult to [facility]. We have reviewed the patient chart. This patient does not meet criteria for [facility] medicine service at this time due to Patient is a new admission. Will hand back to In-house service.     Please re-consult tomorrow if patient remains stable for [facility] medicine.    Jose Gates MD  [facility] Medicine

## 2022-01-19 NOTE — PROGRESS NOTES
[facility] Medicine  Progress Note    Patient Name: Sherin Ortiz  MRN: [id number]  Patient Class: IP- Inpatient   Admission Date: 1/18/2022  Length of Stay: 0 days  Attending Physician: Derrick Viera, *  Primary Care Provider: Deedee Calderon MD        Subjective:     Principal Problem:Pyelonephritis        HPI:  Sherin Ortiz is a 77 y.o. 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), Benign essential hypertension (11/14/2012), Cataract, Chronic kidney disease (CKD), stage III (moderate) (9/12/2013), Diabetic peripheral neuropathy associated with type 2 diabetes mellitus (11/14/2014), Gait disorder, Hyperlipidemia, Iritis - Both Eyes (6/10/2013), Kidney stone, Lymphedema, Morbid obesity with BMI of 40.0-44.9, adult (2/18/2015), Nephrolithiasis (4/20/2016), NS (nuclear sclerosis) (4/1/2013), Nuclear sclerosis - Both Eyes (4/29/2013), Preseptal cellulitis - Right Eye (4/29/2013), Proliferative diabetic retinopathy - Both Eyes (4/29/2013), Proliferative diabetic retinopathy, both eyes (4/1/2013), PSC (posterior subcapsular cataract) - Both Eyes (4/29/2013), S/P hernia repair (12/19/2012), TIA (transient ischemic attack) (11/18/2014), Tinea pedis (7/24/2012), Type 2 diabetes mellitus with diabetic polyneuropathy, with long-term current use of insulin (9/18/2015), Type 2 diabetes mellitus with renal manifestations, controlled (12/12/2013), Type 2 diabetes, controlled, with moderate nonproliferative diabetic retinopathy without macular edema (9/17/2015), Ulcer of left lower extremity, limited to breakdown of skin (7/8/2015), Unspecified cerebral artery occlusion with cerebral infarction (11/16/2014), Unspecified venous (peripheral) insufficiency, Ureteral stone with hydronephrosis (1/27/2016), UTI (lower urinary tract infection), Vaginal infection, and 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     The patient presented to the ED due to low  back pain, dysuria, abdominal pain, and decreased appetite. When re interviewed she states she never has symptoms such as dysuria or frequency when she has UTIs so that is why she never knows when she has an infection. Patient's other symptoms have been going on approximately 1 week.  On January 14, 2022 patient was prescribed doxycycline for presumed UTI.  She reports persistent pain since then.  She has not tried taking any pain medication. Denies history of trauma incontinence numbness or weakness. She also reports an infection to her lower extremities and will underwent wound care food dressed her wounds today. In ED labs remarkable for UTI on UA and LOREN on CKD. There is urinary bladder wall thickening and perivesicular haziness, correlation for UTI/cystitis is needed. CT abd/pelvis:There is mild prominence of the middle pole calyx of the left kidney, without additional evidence for hydronephrosis, and without evidence for ureteral calculus or obstructive uropathy otherwise seen, this may relate to sequelae of a recently passed calculus, may relate to variant anatomy, correlation for UTI/pyelonephritis is needed, note is made there does not appear to be significant perinephric inflammatory change. Cystic lesion of the right adnexa again noted, stable appearance, clinical and historical correlation is needed. Bilateral adrenal nodules, likely represent adrenal adenomas. Started on Rocephin. Admitted to [facility] for further care.      Overview/Hospital Course:  No notes on file    Interval History:  Doing well today.  Reports the back pain has improved.  No nausea or vomiting.  Anticipated discharge on oral antibiotics based off previous urine cultures today; however, patient's admission blood cultures now growing bacteria, possibly contaminant.  Will continue on IV antibiotics and monitor for culture results.    Review of Systems   Constitutional: Negative for fever.   Respiratory: Negative for  "shortness of breath.    Cardiovascular: Negative for chest pain.   Genitourinary: Positive for flank pain (Improved).     Objective:     Vital Signs (Most Recent):  Temp: 97.5 °F (36.4 °C) (01/19/22 1159)  Pulse: 71 (01/19/22 1159)  Resp: 18 (01/19/22 1159)  BP: (!) 182/93 (01/19/22 1159)  SpO2: 98 % (01/19/22 1222) Vital Signs (24h Range):  Temp:  [96.8 °F (36 °C)-97.8 °F (36.6 °C)] 97.5 °F (36.4 °C)  Pulse:  [71-78] 71  Resp:  [18-19] 18  SpO2:  [97 %-99 %] 98 %  BP: (141-198)/(66-93) 182/93     Weight: 113.4 kg (250 lb)  Body mass index is 39.16 kg/m².    Intake/Output Summary (Last 24 hours) at 1/19/2022 1623  Last data filed at 1/19/2022 1300  Gross per 24 hour   Intake --   Output 2800 ml   Net -2800 ml      Physical Exam  Vitals and nursing note reviewed.   Constitutional:       General: She is not in acute distress.     Appearance: Normal appearance. She is obese. She is not ill-appearing.   HENT:      Head: Normocephalic and atraumatic.      Mouth/Throat:      Mouth: Mucous membranes are dry.   Eyes:      Extraocular Movements: Extraocular movements intact.      Pupils: Pupils are equal, round, and reactive to light.   Cardiovascular:      Rate and Rhythm: Normal rate and regular rhythm.      Pulses: Normal pulses.      Heart sounds: Normal heart sounds. No murmur heard.      Pulmonary:      Effort: Pulmonary effort is normal. No respiratory distress.      Breath sounds: Normal breath sounds. No wheezing.   Abdominal:      General: Bowel sounds are normal. There is no distension.      Palpations: Abdomen is soft. There is no mass.      Tenderness: There is no abdominal tenderness. There is right CVA tenderness. There is no guarding or rebound.   Musculoskeletal:         General: Swelling present. Normal range of motion.      Cervical back: Normal range of motion.      Comments: Camilo taylor noted to BLE "wounds"   Patient states she had wound care today and does not wish to unwrap     Skin:     General: Skin is " warm and dry.      Capillary Refill: Capillary refill takes 2 to 3 seconds.      Comments: Venous stasis skin changes noted   Neurological:      General: No focal deficit present.      Mental Status: She is alert and oriented to person, place, and time. Mental status is at baseline.   Psychiatric:         Mood and Affect: Mood normal.         Behavior: Behavior normal.         Thought Content: Thought content normal.         Judgment: Judgment normal.         Significant Labs: All pertinent labs within the past 24 hours have been reviewed.    Significant Imaging: I have reviewed all pertinent imaging results/findings within the past 24 hours.      Assessment/Plan:      * Pyelonephritis  c/o back pain and decreased appetite lasting approximately 1 week.    History of renal stones with stenting and Ecoli  1/14/22 was prescribed doxycycline for presumed UTI.  She reports persistent pain since then.    UA positive  CT abd/pelvis: There is urinary bladder wall thickening and perivesicular haziness, correlation for UTI/cystitis is needed. There is mild prominence of the middle pole calyx of the left kidney, without additional evidence for hydronephrosis, and without evidence for ureteral calculus or obstructive uropathy otherwise seen, this may relate to sequelae of a recently passed calculus, may relate to variant anatomy, correlation for UTI/pyelonephritis is needed, note is made there does not appear to be significant perinephric inflammatory change. Cystic lesion of the right adnexa again noted, stable appearance, clinical and historical correlation is needed. Bilateral adrenal nodules, likely represent adrenal adenomas.  -Started on rocephin in ED-continue for now  -previous culture growing Proteus; she was treated as outpatient with doxycycline  -ideally would treat with fluoroquinolone or penicillin, but previous results resistant to fluoroquinolone and patient reports allergies to penicillin and sulfa  medications    (HFpEF) heart failure with preserved ejection fraction  Patient is identified as having Diastolic (HFpEF) heart failure that is Chronic. CHF is currently controlled. Latest ECHO performed and demonstrates- Results for orders placed during the hospital encounter of 11/21/20    Echo Color Flow Doppler? Yes    Interpretation Summary  · Mild left atrial enlargement.  · There is left ventricular eccentric hypertrophy.  · The left ventricle is normal in size with normal systolic function. The estimated ejection fraction is 60%.  · Indeterminate diastolic function.  · Normal right ventricular size with normal right ventricular systolic function.  · The estimated PA systolic pressure is 31 mmHg.  · Normal central venous pressure (3 mmHg).  . Continue home meds and monitor clinical status closely. Monitor on telemetry. Patient is off CHF pathway.  Monitor strict Is&Os and daily weights.  Place on fluid restriction of 1.5 L. Continue to stress to patient importance of self efficacy and  on diet for CHF. Last BNP reviewed- and noted below No results for input(s): BNP, BNPTRIAGEBLO in the last 168 hours..          PAOD (peripheral arterial occlusive disease)  DAPT, statin      Aortic atherosclerosis  Continue ASA, statin      Anemia of chronic disease  Stable  monitor      Type 2 diabetes mellitus with diabetic polyneuropathy, with long-term current use of insulin  Hemoglobin A1C   Date Value Ref Range Status   01/18/2022 8.4 (H) 4.0 - 5.6 % Final   -SSI  -detemir 10 units nightly  -accuchecks ACHS  -diabetic diet        Acute renal failure superimposed on stage 4 chronic kidney disease  Baseline Cr 1.6-1.8  -Cr 2.2  -suspect prerenal LOREN in setting of UTI  -given 500 cc LR bolus  -avoid nephrotoxic meds  -renal dose meds  -avoid events causing decreased renal perfusion   -appears to be back towards baseline; will need outpatient Nephrology follow-up      Debility  Wheelchair dependent  Inability to  walk  Fall precautions      Essential hypertension  BP increasing today  Takes procardia at home  Continue and monitor; could consider additional agent if continues to remain elevated throughout the day    Hyperlipidemia LDL goal <100  Continue home atorvastatin      Lymphedema of both lower extremities  Venous ulcer of both lower extremities with varicose veins  Venous insufficiency of both lower extremities  Camilo taylor present, some erythema is noted, venous stasis skin changes noted  Patient reports having recent skin infection, wound care came yesterday and does not wish to unwrap legs for assessment  -Wound care consult      VTE Risk Mitigation (From admission, onward)         Ordered     heparin (porcine) injection 5,000 Units  Every 8 hours         01/18/22 0317     IP VTE HIGH RISK PATIENT  Once         01/18/22 0317     Place sequential compression device  Until discontinued         01/18/22 0317                Discharge Planning   GABE: 1/19/2022     Code Status: Full Code   Is the patient medically ready for discharge?:     Reason for patient still in hospital (select all that apply): Patient trending condition                     Derrick Viera MD  Department of Hospital Medicine   [facility]

## 2022-01-19 NOTE — PROGRESS NOTES
Future Appointments   Date Time Provider Department Center   1/24/2022  8:00 AM Jody Murray NP [facility] NEPHRO [address]   1/27/2022  2:00 PM Gretta Perry DO [facility] WOUND [facility]   2/4/2022  8:45 AM Adrienne Chisholm MD [facility] IMPRI [facility]   2/4/2022 10:00 AM Christian Bishop MD [facility] CARDIO [facility]   3/8/2022  2:30 PM Deedee Calderon MD [facility] IM [facility] PC[facility]

## 2022-01-19 NOTE — PLAN OF CARE
Pt. AAOx4. Denies pain at this time. Denies N/V as well. Wound care assessed and wound care nurse came around during day shift. VSS. BS monitored. Bed alarm on and call light in reach. Pt Instructed to call for assistance.    Problem: Adult Inpatient Plan of Care  Goal: Patient-Specific Goal (Individualized)  Outcome: Ongoing, Progressing     Problem: Diabetes Comorbidity  Goal: Blood Glucose Level Within Targeted Range  Outcome: Ongoing, Progressing     Problem: Fluid and Electrolyte Imbalance (Acute Kidney Injury/Impairment)  Goal: Fluid and Electrolyte Balance  Outcome: Ongoing, Progressing     Problem: Oral Intake Inadequate (Acute Kidney Injury/Impairment)  Goal: Optimal Nutrition Intake  Outcome: Ongoing, Progressing     Problem: Renal Function Impairment (Acute Kidney Injury/Impairment)  Goal: Effective Renal Function  Outcome: Ongoing, Progressing     Problem: Impaired Wound Healing  Goal: Optimal Wound Healing  Outcome: Ongoing, Progressing     Problem: Fall Injury Risk  Goal: Absence of Fall and Fall-Related Injury  Outcome: Ongoing, Progressing     Problem: Skin Injury Risk Increased  Goal: Skin Health and Integrity  Outcome: Ongoing, Progressing

## 2022-01-19 NOTE — PLAN OF CARE
VN note: Pt arrived to unit. Introduced self as VN for this shift. Admission questions completed by VN. Educated pt on VTE risk, safety precautions, and VN's role in pt care. Opportunity given for pt's questions. All questions answered.     Problem: Adult Inpatient Plan of Care  Goal: Plan of Care Review  Outcome: Ongoing, Progressing  Goal: Patient-Specific Goal (Individualized)  Outcome: Ongoing, Progressing  Goal: Absence of Hospital-Acquired Illness or Injury  Outcome: Ongoing, Progressing  Goal: Optimal Comfort and Wellbeing  Outcome: Ongoing, Progressing  Goal: Readiness for Transition of Care  Outcome: Ongoing, Progressing     Problem: Diabetes Comorbidity  Goal: Blood Glucose Level Within Targeted Range  Outcome: Ongoing, Progressing     Problem: Fluid and Electrolyte Imbalance (Acute Kidney Injury/Impairment)  Goal: Fluid and Electrolyte Balance  Outcome: Ongoing, Progressing     Problem: Oral Intake Inadequate (Acute Kidney Injury/Impairment)  Goal: Optimal Nutrition Intake  Outcome: Ongoing, Progressing     Problem: Renal Function Impairment (Acute Kidney Injury/Impairment)  Goal: Effective Renal Function  Outcome: Ongoing, Progressing     Problem: Impaired Wound Healing  Goal: Optimal Wound Healing  Outcome: Ongoing, Progressing     Problem: Fall Injury Risk  Goal: Absence of Fall and Fall-Related Injury  Outcome: Ongoing, Progressing

## 2022-01-19 NOTE — PROGRESS NOTES
Progress Note  Nephrology      Consult Requested By: Derrick Viera, *      SUBJECTIVE:     Overnight events  Patient is a 77 y.o. female     Patient Active Problem List   Diagnosis    Lymphedema of both lower extremities    Hyperlipidemia LDL goal <100    Essential hypertension    Debility    PSC (posterior subcapsular cataract) - Both Eyes    Nuclear sclerosis - Both Eyes    Asteroid hyalosis - Left Eye    Acute renal failure superimposed on stage 4 chronic kidney disease    Inability to walk    Morbid obesity with body mass index (BMI) greater than or equal to 50    Type 2 diabetes mellitus with diabetic polyneuropathy, with long-term current use of insulin    Anemia of chronic disease    Hypoalbuminemia    Wheelchair dependent    Dermatitis, stasis    Hyperparathyroidism    Aortic atherosclerosis    PAOD (peripheral arterial occlusive disease)    Hx of transient ischemic attack (TIA)    (HFpEF) heart failure with preserved ejection fraction    Goals of care, counseling/discussion    Dermatitis associated with moisture    Lymphedema    Closed nondisplaced fracture of left calcaneus    Osteopenia    Charcot ankle, left    Benign hypertensive heart and kidney disease with HF and CKD    Type 2 diabetes mellitus with hypoglycemia without coma, with long-term current use of insulin    Unable to bear weight    UTI due to extended-spectrum beta lactamase (ESBL) producing Escherichia coli    Venous ulcer of both lower extremities with varicose veins    Ulcer of great toe, left, limited to breakdown of skin    Obesity, morbid (more than 100 lbs over ideal weight or BMI > 40)    Skin ulcer of left great toe with fat layer exposed    Venous insufficiency of both lower extremities    Venous reflux    Pyelonephritis    Adrenal nodule     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    Benign essential hypertension 11/14/2012     Cataract     s/p phacoemulsification    Chronic kidney disease (CKD), stage III (moderate) 9/12/2013    Diabetic peripheral neuropathy associated with type 2 diabetes mellitus 11/14/2014    causing right hemiparesis    Gait disorder     Hyperlipidemia     Iritis - Both Eyes 6/10/2013    Kidney stone     Lymphedema     Morbid obesity with BMI of 40.0-44.9, adult 2/18/2015    Nephrolithiasis 4/20/2016    NS (nuclear sclerosis) 4/1/2013    Nuclear sclerosis - Both Eyes 4/29/2013    Preseptal cellulitis - Right Eye 4/29/2013    Proliferative diabetic retinopathy - Both Eyes 4/29/2013    Proliferative diabetic retinopathy, both eyes 4/1/2013    PSC (posterior subcapsular cataract) - Both Eyes 4/29/2013    S/P hernia repair 12/19/2012    TIA (transient ischemic attack) 11/18/2014    Tinea pedis 7/24/2012    Tinea pedis is present on both feet.     Type 2 diabetes mellitus with diabetic polyneuropathy, with long-term current use of insulin 9/18/2015    Type 2 diabetes mellitus with renal manifestations, controlled 12/12/2013    Type 2 diabetes, controlled, with moderate nonproliferative diabetic retinopathy without macular edema 9/17/2015    Ulcer of left lower extremity, limited to breakdown of skin 7/8/2015    Unspecified cerebral artery occlusion with cerebral infarction 11/16/2014    Unspecified venous (peripheral) insufficiency     Ureteral stone with hydronephrosis 1/27/2016    UTI (lower urinary tract infection)     Vaginal infection     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              OBJECTIVE:     Vitals:    01/19/22 0818 01/19/22 1102 01/19/22 1159 01/19/22 1222   BP: (!) 180/80 (!) 185/77 (!) 182/93    BP Location: Left arm      Patient Position:   Lying    Pulse:  71 71    Resp:   18    Temp:   97.5 °F (36.4 °C)    TempSrc:   Oral    SpO2:   98% 98%   Weight:       Height:           Temp: 97.5 °F (36.4 °C) (01/19/22 1159)  Pulse: 71 (01/19/22 1159)  Resp: 18 (01/19/22  1159)  BP: (!) 182/93 (01/19/22 1159)  SpO2: 98 % (01/19/22 1222)    Date 01/19/22 0700 - 01/20/22 0659   Shift 8739-9112 0650-4706 4927-3940 24 Hour Total   INTAKE   Shift Total(mL/kg)       OUTPUT   Urine(mL/kg/hr) 300(0.3) 300  600   Shift Total(mL/kg) 300(2.6) 300(2.6)  600(5.3)   Weight (kg) 113.4 113.4 113.4 113.4             Medications:   aspirin  81 mg Oral Daily    atorvastatin  40 mg Oral QHS    cefTRIAXone (ROCEPHIN) IVPB  1 g Intravenous Q24H    clopidogreL  75 mg Oral Daily    heparin (porcine)  5,000 Units Subcutaneous Q8H    insulin detemir U-100  10 Units Subcutaneous QHS    miconazole NITRATE 2 %   Topical (Top) BID    NIFEdipine  60 mg Oral Daily    sodium chloride 0.9%  10 mL Intravenous Q8H     Physical Exam:  General appearance:  Reports  Back discomfort  Weakness  Lungs: diminished breath sounds  Heart: Pulse 71  Abdomen: soft  Extremities: edema  Skin: dry  Laboratory:  ABG  Labs reviewed  Recent Results (from the past 336 hour(s))   Basic Metabolic Panel    Collection Time: 01/19/22  4:54 AM   Result Value Ref Range    Sodium 140 136 - 145 mmol/L    Potassium 4.7 3.5 - 5.1 mmol/L    Chloride 108 95 - 110 mmol/L    CO2 19 (L) 23 - 29 mmol/L    BUN 40 (H) 8 - 23 mg/dL    Creatinine 1.9 (H) 0.5 - 1.4 mg/dL    Calcium 8.7 8.7 - 10.5 mg/dL    Anion Gap 13 8 - 16 mmol/L     Recent Results (from the past 336 hour(s))   CBC auto differential    Collection Time: 01/18/22  2:15 AM   Result Value Ref Range    WBC 4.89 3.90 - 12.70 K/uL    Hemoglobin 11.6 (L) 12.0 - 16.0 g/dL    Hematocrit 35.2 (L) 37.0 - 48.5 %    Platelets 253 150 - 450 K/uL     Urinalysis  No results for input(s): COLORU, CLARITYU, SPECGRAV, PHUR, PROTEINUA, GLUCOSEU, BILIRUBINCON, BLOODU, WBCU, RBCU, BACTERIA, MUCUS, NITRITE, LEUKOCYTESUR, UROBILINOGEN, HYALINECASTS in the last 24 hours.    Diagnostic Results:  X-Ray: Reviewed  US: Reviewed  Echo: Reviewed  ACCESS    ASSESSMENT/PLAN:   LOREN/ CKD 4  Diabetes Mellitus  Hb A1c  8.4  Probably Diabetic Nephropathy  Proteinuria nephrotic range 3 g - 11/22/21  UA protein/creatinine 3.65 g 1/18/22  UA protein 2+, RBC 2, glucose 3+, WBC 7.  US 2019  Sonographic findings compatible with chronic medical renal disease.  No hydronephrosis.  GFR 25 cc/min  Creatinine 1.9  BUN 40  K 4.7  Metabolic acidosis  Metabolic bone disease  Secondary hyperparathyroidism  Poor nutrition   Albumin 2.9  Anemia multifactorial Hb 11.6  Echo  · Mild left atrial enlargement.  · There is left ventricular eccentric hypertrophy.  · The left ventricle is normal in size with normal systolic function. The estimated ejection fraction is 60%.  · Indeterminate diastolic function.  · Normal right ventricular size with normal right ventricular systolic function.  · The estimated PA systolic pressure is 31 mmHg.  · Normal central venous pressure (3 mmHg).  ·   · Blood pressure 141/66- 182/93  · Nifedipine 60 mg   Weight daily   I and O  Avoid nephrotoxic agents, hypotension, hypovolemia      Renal, ADA diet      Wound care       PT

## 2022-01-19 NOTE — ASSESSMENT & PLAN NOTE
Baseline Cr 1.6-1.8  -Cr 2.2  -suspect prerenal LOREN in setting of UTI  -given 500 cc LR bolus  -avoid nephrotoxic meds  -renal dose meds  -avoid events causing decreased renal perfusion   -appears to be back towards baseline; will need outpatient Nephrology follow-up

## 2022-01-19 NOTE — ASSESSMENT & PLAN NOTE
BP increasing today  Takes procardia at home  Continue and monitor; could consider additional agent if continues to remain elevated throughout the day

## 2022-01-19 NOTE — ASSESSMENT & PLAN NOTE
c/o back pain and decreased appetite lasting approximately 1 week.    History of renal stones with stenting and Ecoli  1/14/22 was prescribed doxycycline for presumed UTI.  She reports persistent pain since then.    UA positive  CT abd/pelvis: There is urinary bladder wall thickening and perivesicular haziness, correlation for UTI/cystitis is needed. There is mild prominence of the middle pole calyx of the left kidney, without additional evidence for hydronephrosis, and without evidence for ureteral calculus or obstructive uropathy otherwise seen, this may relate to sequelae of a recently passed calculus, may relate to variant anatomy, correlation for UTI/pyelonephritis is needed, note is made there does not appear to be significant perinephric inflammatory change. Cystic lesion of the right adnexa again noted, stable appearance, clinical and historical correlation is needed. Bilateral adrenal nodules, likely represent adrenal adenomas.  -Started on rocephin in ED-continue for now  -previous culture growing Proteus; she was treated as outpatient with doxycycline  -ideally would treat with fluoroquinolone or penicillin, but previous results resistant to fluoroquinolone and patient reports allergies to penicillin and sulfa medications

## 2022-01-20 ENCOUNTER — PATIENT OUTREACH (OUTPATIENT)
Dept: ADMINISTRATIVE | Facility: OTHER | Age: 77
End: 2022-01-20
Payer: MEDICARE

## 2022-01-20 DIAGNOSIS — Z09 NEED FOR CASE MANAGEMENT FOLLOW-UP: Primary | ICD-10-CM

## 2022-01-20 PROBLEM — E11.59 OBESITY, DIABETES, AND HYPERTENSION SYNDROME: Status: ACTIVE | Noted: 2022-01-20

## 2022-01-20 PROBLEM — E11.69 OBESITY, DIABETES, AND HYPERTENSION SYNDROME: Status: ACTIVE | Noted: 2022-01-20

## 2022-01-20 PROBLEM — I15.2 OBESITY, DIABETES, AND HYPERTENSION SYNDROME: Status: ACTIVE | Noted: 2022-01-20

## 2022-01-20 PROBLEM — E66.9 OBESITY, DIABETES, AND HYPERTENSION SYNDROME: Status: ACTIVE | Noted: 2022-01-20

## 2022-01-20 LAB
ANION GAP SERPL CALC-SCNC: 8 MMOL/L (ref 8–16)
BACTERIA #/AREA URNS HPF: ABNORMAL /HPF
BACTERIA UR CULT: NO GROWTH
BILIRUB UR QL STRIP: NEGATIVE
BUN SERPL-MCNC: 38 MG/DL (ref 8–23)
CALCIUM SERPL-MCNC: 8.7 MG/DL (ref 8.7–10.5)
CHLORIDE SERPL-SCNC: 110 MMOL/L (ref 95–110)
CLARITY UR: ABNORMAL
CO2 SERPL-SCNC: 22 MMOL/L (ref 23–29)
COLOR UR: YELLOW
CREAT SERPL-MCNC: 1.7 MG/DL (ref 0.5–1.4)
EST. GFR  (AFRICAN AMERICAN): 33 ML/MIN/1.73 M^2
EST. GFR  (NON AFRICAN AMERICAN): 28 ML/MIN/1.73 M^2
GLUCOSE SERPL-MCNC: 108 MG/DL (ref 70–110)
GLUCOSE UR QL STRIP: ABNORMAL
HGB UR QL STRIP: ABNORMAL
HYALINE CASTS #/AREA URNS LPF: 1 /LPF
INTERPRETATION SERPL IFE-IMP: NORMAL
KETONES UR QL STRIP: NEGATIVE
LEUKOCYTE ESTERASE UR QL STRIP: ABNORMAL
MICROSCOPIC COMMENT: ABNORMAL
NITRITE UR QL STRIP: NEGATIVE
PH UR STRIP: 6 [PH] (ref 5–8)
POCT GLUCOSE: 119 MG/DL (ref 70–110)
POCT GLUCOSE: 125 MG/DL (ref 70–110)
POCT GLUCOSE: 177 MG/DL (ref 70–110)
POTASSIUM SERPL-SCNC: 4.3 MMOL/L (ref 3.5–5.1)
PROT UR QL STRIP: ABNORMAL
PTH-INTACT SERPL-MCNC: 215.7 PG/ML (ref 9–77)
RBC #/AREA URNS HPF: 9 /HPF (ref 0–4)
SODIUM SERPL-SCNC: 140 MMOL/L (ref 136–145)
SP GR UR STRIP: 1.01 (ref 1–1.03)
SQUAMOUS #/AREA URNS HPF: 1 /HPF
URN SPEC COLLECT METH UR: ABNORMAL
UROBILINOGEN UR STRIP-ACNC: NEGATIVE EU/DL
VANCOMYCIN SERPL-MCNC: 15.5 UG/ML
WBC #/AREA URNS HPF: >100 /HPF (ref 0–5)

## 2022-01-20 PROCEDURE — 83970 ASSAY OF PARATHORMONE: CPT | Mod: HCNC | Performed by: HOSPITALIST

## 2022-01-20 PROCEDURE — 63600175 PHARM REV CODE 636 W HCPCS: Mod: HCNC | Performed by: NURSE PRACTITIONER

## 2022-01-20 PROCEDURE — A4216 STERILE WATER/SALINE, 10 ML: HCPCS | Mod: HCNC | Performed by: NURSE PRACTITIONER

## 2022-01-20 PROCEDURE — 11000001 HC ACUTE MED/SURG PRIVATE ROOM: Mod: HCNC

## 2022-01-20 PROCEDURE — 80048 BASIC METABOLIC PNL TOTAL CA: CPT | Mod: HCNC | Performed by: HOSPITALIST

## 2022-01-20 PROCEDURE — 25000003 PHARM REV CODE 250: Mod: HCNC | Performed by: INTERNAL MEDICINE

## 2022-01-20 PROCEDURE — 63600175 PHARM REV CODE 636 W HCPCS: Mod: HCNC | Performed by: HOSPITALIST

## 2022-01-20 PROCEDURE — 25000003 PHARM REV CODE 250: Mod: HCNC | Performed by: HOSPITALIST

## 2022-01-20 PROCEDURE — 25000003 PHARM REV CODE 250: Mod: HCNC | Performed by: NURSE PRACTITIONER

## 2022-01-20 PROCEDURE — 81000 URINALYSIS NONAUTO W/SCOPE: CPT | Mod: HCNC | Performed by: INTERNAL MEDICINE

## 2022-01-20 PROCEDURE — 36415 COLL VENOUS BLD VENIPUNCTURE: CPT | Mod: HCNC | Performed by: HOSPITALIST

## 2022-01-20 PROCEDURE — 94761 N-INVAS EAR/PLS OXIMETRY MLT: CPT | Mod: HCNC

## 2022-01-20 PROCEDURE — 99900035 HC TECH TIME PER 15 MIN (STAT): Mod: HCNC

## 2022-01-20 PROCEDURE — 80202 ASSAY OF VANCOMYCIN: CPT | Mod: HCNC | Performed by: HOSPITALIST

## 2022-01-20 RX ORDER — MUPIROCIN 20 MG/G
OINTMENT TOPICAL 2 TIMES DAILY
Status: DISCONTINUED | OUTPATIENT
Start: 2022-01-20 | End: 2022-01-21 | Stop reason: HOSPADM

## 2022-01-20 RX ADMIN — ASPIRIN 81 MG CHEWABLE TABLET 81 MG: 81 TABLET CHEWABLE at 08:01

## 2022-01-20 RX ADMIN — Medication 10 ML: at 05:01

## 2022-01-20 RX ADMIN — MICONAZOLE NITRATE 2 % TOPICAL POWDER: at 08:01

## 2022-01-20 RX ADMIN — LACTOBACILLUS TAB 1 TABLET: TAB at 04:01

## 2022-01-20 RX ADMIN — MICONAZOLE NITRATE 2 % TOPICAL POWDER: at 09:01

## 2022-01-20 RX ADMIN — INSULIN DETEMIR 10 UNITS: 100 INJECTION, SOLUTION SUBCUTANEOUS at 09:01

## 2022-01-20 RX ADMIN — HEPARIN SODIUM 5000 UNITS: 5000 INJECTION INTRAVENOUS; SUBCUTANEOUS at 02:01

## 2022-01-20 RX ADMIN — CEFTRIAXONE 1 G: 1 INJECTION, SOLUTION INTRAVENOUS at 05:01

## 2022-01-20 RX ADMIN — ATORVASTATIN CALCIUM 40 MG: 40 TABLET, FILM COATED ORAL at 09:01

## 2022-01-20 RX ADMIN — CLOPIDOGREL 75 MG: 75 TABLET, FILM COATED ORAL at 08:01

## 2022-01-20 RX ADMIN — HEPARIN SODIUM 5000 UNITS: 5000 INJECTION INTRAVENOUS; SUBCUTANEOUS at 05:01

## 2022-01-20 RX ADMIN — VANCOMYCIN HYDROCHLORIDE 1500 MG: 1.5 INJECTION, POWDER, LYOPHILIZED, FOR SOLUTION INTRAVENOUS at 10:01

## 2022-01-20 RX ADMIN — Medication 10 ML: at 02:01

## 2022-01-20 RX ADMIN — NIFEDIPINE 60 MG: 30 TABLET, FILM COATED, EXTENDED RELEASE ORAL at 08:01

## 2022-01-20 RX ADMIN — HEPARIN SODIUM 5000 UNITS: 5000 INJECTION INTRAVENOUS; SUBCUTANEOUS at 09:01

## 2022-01-20 RX ADMIN — MUPIROCIN: 20 OINTMENT TOPICAL at 09:01

## 2022-01-20 RX ADMIN — Medication 10 ML: at 09:01

## 2022-01-20 NOTE — PROGRESS NOTES
IP Liaison - Initial Visit Note    Patient: Sherin Ortiz  MRN:  [id number]  Date of Service:  1/20/2022  Completed by:  ARIANNA Reid    Reason for Visit   Patient presents with    IP Liaison Initial Visit       RSW contacted pt via room phone in order to complete SDOH questionnaire and liaison assessment.  Pt has identified no immediate social barriers to care.  Per pt, pt is not in need of resources at this time. Due to pt eligibility, RSW referred pt to [facility] ([facility]).    The following were addressed during this visit:  - Review SDOH Questions   - Complete patient assessment   - Refer patient to [facility] ([facility])   - Complete initial visit with patient        Patient Summary     IP Liaison Patient Assessment    General  Level of Caregiver support: Member independent and does not need caregiver assistance  Have you had to make a decision between paying for any of the following in the last 2 months?: None  Transportation means: Para transit service, Insurance-provided transportation, Other  Employment status: Retired and not working  Assessments  Was the PHQ Depression Screening completed this visit?: No  Was the MARTI-7 Screening completed this visit?: No         ARIANNA Reid

## 2022-01-20 NOTE — PROGRESS NOTES
[facility]  Telemedicine Progress Note    Patient Name: Sherin Ortiz  MRN: [id number]  Patient Class: IP- Inpatient   Admission Date: 1/18/2022  Length of Stay: 1 days  Attending Physician: Victoria Lopez MD  Primary Care Provider: Deedee Calderon MD          Subjective:     Principal Problem:Pyelonephritis        HPI:  Sherin Ortiz is a 77 y.o. 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), Benign essential hypertension (11/14/2012), Cataract, Chronic kidney disease (CKD), stage III (moderate) (9/12/2013), Diabetic peripheral neuropathy associated with type 2 diabetes mellitus (11/14/2014), Gait disorder, Hyperlipidemia, Iritis - Both Eyes (6/10/2013), Kidney stone, Lymphedema, Morbid obesity with BMI of 40.0-44.9, adult (2/18/2015), Nephrolithiasis (4/20/2016), NS (nuclear sclerosis) (4/1/2013), Nuclear sclerosis - Both Eyes (4/29/2013), Preseptal cellulitis - Right Eye (4/29/2013), Proliferative diabetic retinopathy - Both Eyes (4/29/2013), Proliferative diabetic retinopathy, both eyes (4/1/2013), PSC (posterior subcapsular cataract) - Both Eyes (4/29/2013), S/P hernia repair (12/19/2012), TIA (transient ischemic attack) (11/18/2014), Tinea pedis (7/24/2012), Type 2 diabetes mellitus with diabetic polyneuropathy, with long-term current use of insulin (9/18/2015), Type 2 diabetes mellitus with renal manifestations, controlled (12/12/2013), Type 2 diabetes, controlled, with moderate nonproliferative diabetic retinopathy without macular edema (9/17/2015), Ulcer of left lower extremity, limited to breakdown of skin (7/8/2015), Unspecified cerebral artery occlusion with cerebral infarction (11/16/2014), Unspecified venous (peripheral) insufficiency, Ureteral stone with hydronephrosis (1/27/2016), UTI (lower urinary tract infection), Vaginal infection, and 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     The patient presented to  the ED due to low back pain, dysuria, abdominal pain, and decreased appetite. When re interviewed she states she never has symptoms such as dysuria or frequency when she has UTIs so that is why she never knows when she has an infection. Patient's other symptoms have been going on approximately 1 week.  On January 14, 2022 patient was prescribed doxycycline for presumed UTI.  She reports persistent pain since then.  She has not tried taking any pain medication. Denies history of trauma incontinence numbness or weakness. She also reports an infection to her lower extremities and will underwent wound care food dressed her wounds today. In ED labs remarkable for UTI on UA and LOREN on CKD. There is urinary bladder wall thickening and perivesicular haziness, correlation for UTI/cystitis is needed. CT abd/pelvis:There is mild prominence of the middle pole calyx of the left kidney, without additional evidence for hydronephrosis, and without evidence for ureteral calculus or obstructive uropathy otherwise seen, this may relate to sequelae of a recently passed calculus, may relate to variant anatomy, correlation for UTI/pyelonephritis is needed, note is made there does not appear to be significant perinephric inflammatory change. Cystic lesion of the right adnexa again noted, stable appearance, clinical and historical correlation is needed. Bilateral adrenal nodules, likely represent adrenal adenomas. Started on Rocephin. Admitted to [facility] for further care.      Overview/Hospital Course:  Ms. Ortiz was admitted to Hospital Medicine for management of pyelo.  She was started on Ceftriaxone.  Urine culture returned with Klebsiella, Enterobacter and Proteus.  Blood cx returned positive, but are likely to be a contaminant.  PT/OT say HH.      Interval History: Transferred to [facility] overnight.  No complaints today.  Flank pain improving.  Discussed potential DC tomorrow pending blood cultures.  Reconfirmed PCN  allergy.    Review of Systems   Constitutional: Negative for chills, fatigue and fever.   Respiratory: Negative for cough and shortness of breath.    Cardiovascular: Negative for chest pain, palpitations and leg swelling.   Gastrointestinal: Negative for abdominal pain, diarrhea, nausea and vomiting.   Genitourinary: Positive for flank pain. Negative for dysuria, frequency and urgency.   Neurological: Negative for dizziness and headaches.   All other systems reviewed and are negative.    Objective:     Vital Signs (Most Recent):  Temp: 97.9 °F (36.6 °C) (01/20/22 1123)  Pulse: 63 (01/20/22 1123)  Resp: 18 (01/20/22 1123)  BP: 139/65 (01/20/22 1123)  SpO2: 97 % (01/20/22 1123) Vital Signs (24h Range):  Temp:  [97.5 °F (36.4 °C)-97.9 °F (36.6 °C)] 97.9 °F (36.6 °C)  Pulse:  [63-71] 63  Resp:  [16-18] 18  SpO2:  [96 %-98 %] 97 %  BP: (133-182)/(62-93) 139/65     Weight: 119.7 kg (263 lb 14.3 oz)  Body mass index is 41.33 kg/m².    Intake/Output Summary (Last 24 hours) at 1/20/2022 1144  Last data filed at 1/20/2022 0557  Gross per 24 hour   Intake 656.63 ml   Output 1200 ml   Net -543.37 ml      Physical Exam  Vitals reviewed.   Constitutional:       General: She is not in acute distress.     Appearance: Normal appearance. She is not ill-appearing or toxic-appearing.   HENT:      Head: Normocephalic and atraumatic.   Eyes:      General: No scleral icterus.     Conjunctiva/sclera: Conjunctivae normal.   Pulmonary:      Effort: Pulmonary effort is normal. No respiratory distress.   Skin:     Coloration: Skin is not jaundiced.      Findings: No erythema.   Neurological:      General: No focal deficit present.      Mental Status: She is alert and oriented to person, place, and time.   Psychiatric:         Mood and Affect: Mood normal.         Behavior: Behavior normal.         Significant Labs: All pertinent labs within the past 24 hours have been reviewed.    Significant Imaging: I have reviewed all pertinent imaging  results/findings within the past 24 hours.      Assessment/Plan:      * Pyelonephritis  c/o back pain and decreased appetite lasting approximately 1 week.    History of renal stones with stenting and Ecoli  1/14/22 was prescribed doxycycline for presumed UTI.  She reports persistent pain since then.    UA positive  CT abd/pelvis: There is urinary bladder wall thickening and perivesicular haziness, correlation for UTI/cystitis is needed. There is mild prominence of the middle pole calyx of the left kidney, without additional evidence for hydronephrosis, and without evidence for ureteral calculus or obstructive uropathy otherwise seen, this may relate to sequelae of a recently passed calculus, may relate to variant anatomy, correlation for UTI/pyelonephritis is needed, note is made there does not appear to be significant perinephric inflammatory change. Cystic lesion of the right adnexa again noted, stable appearance, clinical and historical correlation is needed. Bilateral adrenal nodules, likely represent adrenal adenomas.  -Started on rocephin in ED-continue for now  -previous culture growing Proteus; she was treated as outpatient with doxycycline  -ideally would treat with fluoroquinolone or penicillin, but previous results resistant to fluoroquinolone and patient reports allergies to penicillin and sulfa medications  Plan to continue Ceftriaxone today and DC on Cefpodoxime tomorrow    Obesity, diabetes, and hypertension syndrome  As above      Adrenal nodule        Venous insufficiency of both lower extremities        Obesity, morbid (more than 100 lbs over ideal weight or BMI > 40)  Body mass index is 41.33 kg/m².  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 CHF is currently controlled. Latest ECHO performed and demonstrates- Results for orders placed during the hospital encounter of 11/21/20    Echo Color Flow Doppler? Yes    Interpretation Summary  · Mild left atrial enlargement.  · There is left ventricular eccentric hypertrophy.  · The left ventricle is normal in size with normal systolic function. The estimated ejection fraction is 60%.  · Indeterminate diastolic function.  · Normal right ventricular size with normal right ventricular systolic function.  · The estimated PA systolic pressure is 31 mmHg.  · Normal central venous pressure (3 mmHg).  . Continue home meds and monitor clinical status closely. Monitor on telemetry. Patient is off CHF pathway.  Monitor strict Is&Os and daily weights.  Place on fluid restriction of 1.5 L. Continue to stress to patient importance of self efficacy and  on diet for CHF. Last BNP reviewed- and noted below No results for input(s): BNP, BNPTRIAGEBLO in the last 168 hours..          PAOD (peripheral arterial occlusive disease)  DAPT, statin      Aortic atherosclerosis  Continue ASA, statin      Anemia of chronic disease  Stable  monitor      Type 2 diabetes mellitus with diabetic polyneuropathy, with long-term current use of insulin  Hemoglobin A1C   Date Value Ref Range Status   01/18/2022 8.4 (H) 4.0 - 5.6 % Final   -SSI  -detemir 10 units nightly  -accuchecks ACHS  -diabetic diet        Acute renal failure superimposed on stage 4 chronic kidney disease  Baseline Cr 1.6-1.8, was 2.2  -suspect prerenal LOREN in setting of UTI  -given 500 cc LR bolus  -avoid nephrotoxic meds  -renal dose meds  -avoid events causing decreased renal perfusion   -appears to be back towards baseline; will need outpatient Nephrology follow-up      Debility  Wheelchair dependent  Inability to walk  Fall precautions      Essential hypertension  Chronic issue  Restart Procardia    Hyperlipidemia LDL goal <100  Continue home  atorvastatin      Lymphedema of both lower extremities  Venous ulcer of both lower extremities with varicose veins  Venous insufficiency of both lower extremities  Camilo hose present, some erythema is noted, venous stasis skin changes noted  Patient reports having recent skin infection, wound care came yesterday and does not wish to unwrap legs for assessment  -Wound care consult      VTE Risk Mitigation (From admission, onward)         Ordered     heparin (porcine) injection 5,000 Units  Every 8 hours         01/18/22 0317     IP VTE HIGH RISK PATIENT  Once         01/18/22 0317     Place sequential compression device  Until discontinued         01/18/22 0317                      I have assessed these finding virtually using telemed platform and with assistance of bedside nurse                 The attending portion of this evaluation, treatment, and documentation was performed per Victoria Lopez MD via Telemedicine AudioVisual using the secure [employer] software platform with 2 way audio/video. The provider was located off-site and the patient is located in the hospital. The aforementioned video software was utilized to document the relevant history and physical exam    Victoria Lopez MD  Department of Hospital Medicine   [facility]

## 2022-01-20 NOTE — SUBJECTIVE & OBJECTIVE
Interval History: Transferred to [facility] overnight.  No complaints today.  Flank pain improving.  Discussed potential DC tomorrow pending blood cultures.  Reconfirmed PCN allergy.    Review of Systems   Constitutional: Negative for chills, fatigue and fever.   Respiratory: Negative for cough and shortness of breath.    Cardiovascular: Negative for chest pain, palpitations and leg swelling.   Gastrointestinal: Negative for abdominal pain, diarrhea, nausea and vomiting.   Genitourinary: Positive for flank pain. Negative for dysuria, frequency and urgency.   Neurological: Negative for dizziness and headaches.   All other systems reviewed and are negative.    Objective:     Vital Signs (Most Recent):  Temp: 97.9 °F (36.6 °C) (01/20/22 1123)  Pulse: 63 (01/20/22 1123)  Resp: 18 (01/20/22 1123)  BP: 139/65 (01/20/22 1123)  SpO2: 97 % (01/20/22 1123) Vital Signs (24h Range):  Temp:  [97.5 °F (36.4 °C)-97.9 °F (36.6 °C)] 97.9 °F (36.6 °C)  Pulse:  [63-71] 63  Resp:  [16-18] 18  SpO2:  [96 %-98 %] 97 %  BP: (133-182)/(62-93) 139/65     Weight: 119.7 kg (263 lb 14.3 oz)  Body mass index is 41.33 kg/m².    Intake/Output Summary (Last 24 hours) at 1/20/2022 1144  Last data filed at 1/20/2022 0557  Gross per 24 hour   Intake 656.63 ml   Output 1200 ml   Net -543.37 ml      Physical Exam  Vitals reviewed.   Constitutional:       General: She is not in acute distress.     Appearance: Normal appearance. She is not ill-appearing or toxic-appearing.   HENT:      Head: Normocephalic and atraumatic.   Eyes:      General: No scleral icterus.     Conjunctiva/sclera: Conjunctivae normal.   Pulmonary:      Effort: Pulmonary effort is normal. No respiratory distress.   Skin:     Coloration: Skin is not jaundiced.      Findings: No erythema.   Neurological:      General: No focal deficit present.      Mental Status: She is alert and oriented to person, place, and time.   Psychiatric:         Mood and Affect: Mood normal.         Behavior:  Behavior normal.         Significant Labs: All pertinent labs within the past 24 hours have been reviewed.    Significant Imaging: I have reviewed all pertinent imaging results/findings within the past 24 hours.

## 2022-01-20 NOTE — PLAN OF CARE
"[place] - [facility] Surg  Home Health Orders  Face to Face Encounter    Patient Name: Sherin Ortiz  YOB: 1945    PCP: Deedee Calderon MD   PCP Address: [address] [place] [address]  PCP Phone Number: [phone]  PCP Fax: [phone]    Encounter Date: 01/20/2022    Admit To Home Health    Diagnoses:  Active Hospital Problems    Diagnosis  POA    *Pyelonephritis [N12]  Yes    Adrenal nodule [E27.8]  Yes    Venous insufficiency of both lower extremities [I87.2]  Yes    Venous ulcer of both lower extremities with varicose veins [I83.019, I83.029, L97.919, L97.929]  Yes    (HFpEF) heart failure with preserved ejection fraction [I50.30]  Yes    Aortic atherosclerosis [I70.0]  Yes     CXR 1/2016      PAOD (peripheral arterial occlusive disease) [I77.9]  Yes     Location in Record and Date:   VAS DISHA-9/18/2015    "Rt DISHA (1.12) Segment/Brachial Index and PVR wavef  orms indicate minimal peripheral arterial obstructive disease.  Lt DISHA (1.09) Segment/Brachial Index and PVR waveforms indicate minimal peripheral arterial obstructive disease."  Other Chronic Conditions:  HLD, DM    Medications:  Aspirin 81 mg, Lipi  tor 40 mg      Type 2 diabetes mellitus with diabetic polyneuropathy, with long-term current use of insulin [E11.42, Z79.4]  Not Applicable     Chronic    Anemia of chronic disease [D63.8]  Yes     Chronic    Acute renal failure superimposed on stage 4 chronic kidney disease [N17.9, N18.4]  Yes    Debility [R53.81]  Yes     Chronic    Essential hypertension [I10]  Yes     Chronic    Hyperlipidemia LDL goal <100 [E78.5]  Yes     Chronic    Lymphedema of both lower extremities [I89.0]  Yes      Resolved Hospital Problems   No resolved problems to display.       Future Appointments   Date Time Provider Department Center   1/24/2022  8:00 AM Jody Murray NP [facility] NEPHRO [facility]   1/27/2022  2:00 PM Gretta Perry DO [facility] WOUND [facility]   2/4/2022  8:45 " AM Adrienne Chisholm MD [facility] IMPRI [facility]   2/4/2022 10:00 AM Christian Bishop MD [facility] CARDIO [facility]   3/8/2022  2:30 PM Deedee Calderon MD [facility] PCW      Follow-up Information     Jody Murray NP On 1/24/2022.    Specialty: Nephrology  Why: 8:00 am  -- when patient discharges to let the office  know to reschedule labs and appt if needed.  Contact information:  [address]  [facility] [address]  [phone]             [facility] On 1/27/2022.    Specialty: Wound Care  Why: 2:00pm  Contact information:  [address]  [facility] [id number] [phone]  Additional information:  Please park in Lot C or D and use the Kory truong. Check in at [facility] [address].           Adrienne Chisholm MD On 2/4/2022.    Specialty: Internal Medicine  Why: 8:45 am   Contact information:  [address]  [address]  [facility] [address]  [phone]             Christian Bishop MD On 2/4/2022.    Specialties: Interventional Cardiology, Cardiology  Why: 10:00 am    Contact information:  [address]  [address]  [facility] [address]  [phone]             [facility].    Specialty: Home Health Services  Why: Home Health  Contact information:  [address]  [address]  [facility] [address]  [phone]                           I have seen and examined this patient face to face today. My clinical findings that support the need for the home health skilled services and home bound status are the following:  Weakness/numbness causing balance and gait disturbance due to Weakness/Debility making it taxing to leave home.      Allergies:  Review of patient's allergies indicates:   Allergen Reactions    Penicillins Hives     Other reaction(s): Hives  Patient has received cefdinir, ceftriaxone, cefazolin and cefepime in the past with no documented reactions    Sulfa (sulfonamide antibiotics) Other (See Comments)      Eyad, pt states her doctor told her the shakes were possibly caused by an allergy to sulfa         Diet: cardiac diet and diabetic diet: 1800 calorie      Activities: activity as tolerated      Nursing:   SN to complete comprehensive assessment including routine vital signs. Instruct on disease process and s/s of complications to report to MD. Review/verify medication list sent home with the patient at time of discharge  and instruct patient/caregiver as needed. Frequency may be adjusted depending on start of care date.    Notify MD if SBP > 160 or < 90; DBP > 90 or < 50; HR > 120 or < 50; Temp > 101;       Consults:      Physical Therapy to evaluate and treat. Evaluate for home safety and equipment needs; Establish/upgrade home exercise program. Perform / instruct on therapeutic exercises, gait training, transfer training, and Range of Motion.  Occupational Therapy to evaluate and treat. Evaluate home environment for safety and equipment needs. Perform/Instruct on transfers, ADL training, ROM, and therapeutic exercises.      Miscellaneous Care:  Diabetic Care:   SN to perform and educate Diabetic management with blood glucose monitoring: and Report CBG < 60 or > 350 to physician.      Wound Care:  n/a      Medications: Review discharge medications with patient and family and provide education.      Current Discharge Medication List      START taking these medications    Details   cefpodoxime (VANTIN) 200 MG tablet Take 1 tablet (200 mg total) by mouth 2 (two) times daily. for 13 days  Qty: 26 tablet, Refills: 0         CONTINUE these medications which have CHANGED    Details   insulin glargine (LANTUS U-100 INSULIN) 100 unit/mL injection Inject 10-15 Units into the skin every evening. DEPENDING ON SCALE (DISCARD EACH VIAL AFTER 28 DAYS)         CONTINUE these medications which have NOT CHANGED    Details   atorvastatin (LIPITOR) 40 MG tablet TAKE 1 TABLET EVERY EVENING  Qty: 90 tablet, Refills: 3     "  clopidogreL (PLAVIX) 75 mg tablet Take 1 tablet (75 mg total) by mouth once daily.  Qty: 90 tablet, Refills: 3    Comments: NEW RX REQUEST FOR PATIENT DUE TO USING NEW MAIL ORDER PHARMACY-[facility]      NIFEdipine (PROCARDIA-XL) 60 MG (OSM) 24 hr tablet Take 1 tablet (60 mg total) by mouth once daily.  Qty: 90 tablet, Refills: 3    Comments: .      acetaminophen (TYLENOL) 325 MG tablet Take 2 tablets (650 mg total) by mouth every 6 (six) hours as needed for Pain.  Refills: 0      alcohol swabs (BD ALCOHOL SWABS) PadM Apply 1 each topically 2 (two) times daily.  Qty: 180 each, Refills: 2      aspirin 81 MG Chew Take 81 mg by mouth once daily.      BD INSULIN SYRINGE ULTRA-FINE 0.5 mL 30 gauge x 1/2" Syrg USE TO INJECT EVERY NIGHT  Qty: 90 each, Refills: 3      blood glucose control, high (TRUE METRIX LEVEL 3) Soln Used to calibrate weekly  Qty: 1 each, Refills: 3      blood sugar diagnostic (TRUE METRIX GLUCOSE TEST STRIP) Strp Test twice daily  Qty: 200 strip, Refills: 3      gentamicin (GARAMYCIN) 0.3 % ophthalmic solution Place 1 drop into both eyes 3 (three) times daily.  Qty: 15 mL, Refills: 0      insulin syr/ndl U100 half yesenia (DROPLET INSULIN SYR HALF UNIT) 0.5 mL 30 gauge x 1/2" Syrg USE TO INJECT EVERY NIGHT  Qty: 100 Syringe, Refills: 4      lancets (TRUEPLUS LANCETS) 33 gauge Misc 1 lancet by Misc.(Non-Drug; Combo Route) route 2 (two) times a day.  Qty: 200 each, Refills: 0      miconazole nitrate 2% (MICOTIN) 2 % Oint Apply topically 2 (two) times daily.  Refills: 0      triamcinolone acetonide 0.1% (KENALOG) 0.1 % cream Apply topically 2 (two) times daily.  Qty: 1 Tube, Refills: 3         STOP taking these medications       doxycycline (VIBRA-TABS) 100 MG tablet Comments:   Reason for Stopping:         ketoconazole (NIZORAL) 2 % cream Comments:   Reason for Stopping:               Future Appointments   Date Time Provider Department Center   1/24/2022  8:00 AM Jody Murray, [facility] NEPHRO " [facility]   1/27/2022  2:00 PM Gretta Perry DO [facility] WOUND [facility]   2/4/2022  8:45 AM Adrienne Chisholm MD [facility] IMPRI [facility]   2/4/2022 10:00 AM Christian Bishop MD [facility] CARDIO [facility]   3/8/2022  2:30 PM Deedee Calderon MD [facility]arg[facility] PCW       I certify that this patient is confined to her home and needs intermittent skilled nursing care, physical therapy and occupational therapy.    Victoria Lopez MD  Senior Physician  [facility] Medicine

## 2022-01-20 NOTE — ASSESSMENT & PLAN NOTE
Baseline Cr 1.6-1.8, was 2.2  -suspect prerenal LOREN in setting of UTI  -given 500 cc LR bolus  -avoid nephrotoxic meds  -renal dose meds  -avoid events causing decreased renal perfusion   -appears to be back towards baseline; will need outpatient Nephrology follow-up

## 2022-01-20 NOTE — ASSESSMENT & PLAN NOTE
c/o back pain and decreased appetite lasting approximately 1 week.    History of renal stones with stenting and Ecoli  1/14/22 was prescribed doxycycline for presumed UTI.  She reports persistent pain since then.    UA positive  CT abd/pelvis: There is urinary bladder wall thickening and perivesicular haziness, correlation for UTI/cystitis is needed. There is mild prominence of the middle pole calyx of the left kidney, without additional evidence for hydronephrosis, and without evidence for ureteral calculus or obstructive uropathy otherwise seen, this may relate to sequelae of a recently passed calculus, may relate to variant anatomy, correlation for UTI/pyelonephritis is needed, note is made there does not appear to be significant perinephric inflammatory change. Cystic lesion of the right adnexa again noted, stable appearance, clinical and historical correlation is needed. Bilateral adrenal nodules, likely represent adrenal adenomas.  -Started on rocephin in ED-continue for now  -previous culture growing Proteus; she was treated as outpatient with doxycycline  -ideally would treat with fluoroquinolone or penicillin, but previous results resistant to fluoroquinolone and patient reports allergies to penicillin and sulfa medications  Plan to continue Ceftriaxone today and DC on Cefpodoxime tomorrow

## 2022-01-20 NOTE — PLAN OF CARE
Resting in bed on waffle mattress,AAO,meds and iv abx admin per mar,dressings intact to BLE,no current c/o pain ,bed alarm set.

## 2022-01-20 NOTE — PROGRESS NOTES
Progress Note  Nephrology      Consult Requested By: Victoria Lopez MD      SUBJECTIVE:     Overnight events  Patient is a 77 y.o. female     Patient Active Problem List   Diagnosis    Lymphedema of both lower extremities    Hyperlipidemia LDL goal <100    Essential hypertension    Debility    PSC (posterior subcapsular cataract) - Both Eyes    Nuclear sclerosis - Both Eyes    Asteroid hyalosis - Left Eye    Acute renal failure superimposed on stage 4 chronic kidney disease    Inability to walk    Morbid obesity with body mass index (BMI) greater than or equal to 50    Type 2 diabetes mellitus with diabetic polyneuropathy, with long-term current use of insulin    Anemia of chronic disease    Hypoalbuminemia    Wheelchair dependent    Dermatitis, stasis    Hyperparathyroidism    Aortic atherosclerosis    PAOD (peripheral arterial occlusive disease)    Hx of transient ischemic attack (TIA)    (HFpEF) heart failure with preserved ejection fraction    Goals of care, counseling/discussion    Dermatitis associated with moisture    Lymphedema    Closed nondisplaced fracture of left calcaneus    Osteopenia    Charcot ankle, left    Benign hypertensive heart and kidney disease with HF and CKD    Type 2 diabetes mellitus with hypoglycemia without coma, with long-term current use of insulin    Unable to bear weight    UTI due to extended-spectrum beta lactamase (ESBL) producing Escherichia coli    Venous ulcer of both lower extremities with varicose veins    Ulcer of great toe, left, limited to breakdown of skin    Obesity, morbid (more than 100 lbs over ideal weight or BMI > 40)    Skin ulcer of left great toe with fat layer exposed    Venous insufficiency of both lower extremities    Venous reflux    Pyelonephritis    Adrenal nodule    Obesity, diabetes, and hypertension syndrome     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     Benign essential hypertension 11/14/2012    Cataract     s/p phacoemulsification    Chronic kidney disease (CKD), stage III (moderate) 9/12/2013    Diabetic peripheral neuropathy associated with type 2 diabetes mellitus 11/14/2014    causing right hemiparesis    Gait disorder     Hyperlipidemia     Iritis - Both Eyes 6/10/2013    Kidney stone     Lymphedema     Morbid obesity with BMI of 40.0-44.9, adult 2/18/2015    Nephrolithiasis 4/20/2016    NS (nuclear sclerosis) 4/1/2013    Nuclear sclerosis - Both Eyes 4/29/2013    Preseptal cellulitis - Right Eye 4/29/2013    Proliferative diabetic retinopathy - Both Eyes 4/29/2013    Proliferative diabetic retinopathy, both eyes 4/1/2013    PSC (posterior subcapsular cataract) - Both Eyes 4/29/2013    S/P hernia repair 12/19/2012    TIA (transient ischemic attack) 11/18/2014    Tinea pedis 7/24/2012    Tinea pedis is present on both feet.     Type 2 diabetes mellitus with diabetic polyneuropathy, with long-term current use of insulin 9/18/2015    Type 2 diabetes mellitus with renal manifestations, controlled 12/12/2013    Type 2 diabetes, controlled, with moderate nonproliferative diabetic retinopathy without macular edema 9/17/2015    Ulcer of left lower extremity, limited to breakdown of skin 7/8/2015    Unspecified cerebral artery occlusion with cerebral infarction 11/16/2014    Unspecified venous (peripheral) insufficiency     Ureteral stone with hydronephrosis 1/27/2016    UTI (lower urinary tract infection)     Vaginal infection     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              OBJECTIVE:     Vitals:    01/20/22 0442 01/20/22 0759 01/20/22 1123 01/20/22 1145   BP: 133/62 (!) 145/62 139/65    BP Location: Left arm      Patient Position: Lying Lying Lying    Pulse: 65 64 63    Resp: 17 18 18    Temp: 97.9 °F (36.6 °C) 97.9 °F (36.6 °C) 97.9 °F (36.6 °C)    TempSrc: Oral Oral Oral    SpO2: 96% 98% 97% 98%   Weight:       Height:            Temp: 97.9 °F (36.6 °C) (01/20/22 1123)  Pulse: 63 (01/20/22 1123)  Resp: 18 (01/20/22 1123)  BP: 139/65 (01/20/22 1123)  SpO2: 98 % (01/20/22 1145)              Medications:   aspirin  81 mg Oral Daily    atorvastatin  40 mg Oral QHS    cefTRIAXone (ROCEPHIN) IVPB  1 g Intravenous Q24H    clopidogreL  75 mg Oral Daily    enalaprilat  0.625 mg Intravenous Once    heparin (porcine)  5,000 Units Subcutaneous Q8H    insulin detemir U-100  10 Units Subcutaneous QHS    miconazole NITRATE 2 %   Topical (Top) BID    NIFEdipine  60 mg Oral Daily    sodium chloride 0.9%  10 mL Intravenous Q8H           Physical Exam:  General appearance:NAD  No c/o  No back pain, no abdominal pain  No dysuria  No BM today  Lungs: diminished breath sounds  Heart: Pulse 63  Abdomen: soft  Extremities: edema  Skin: dry  Laboratory:  ABG  Labs reviewed  Recent Results (from the past 336 hour(s))   Basic Metabolic Panel    Collection Time: 01/20/22  4:57 AM   Result Value Ref Range    Sodium 140 136 - 145 mmol/L    Potassium 4.3 3.5 - 5.1 mmol/L    Chloride 110 95 - 110 mmol/L    CO2 22 (L) 23 - 29 mmol/L    BUN 38 (H) 8 - 23 mg/dL    Creatinine 1.7 (H) 0.5 - 1.4 mg/dL    Calcium 8.7 8.7 - 10.5 mg/dL    Anion Gap 8 8 - 16 mmol/L   Basic Metabolic Panel    Collection Time: 01/19/22  4:54 AM   Result Value Ref Range    Sodium 140 136 - 145 mmol/L    Potassium 4.7 3.5 - 5.1 mmol/L    Chloride 108 95 - 110 mmol/L    CO2 19 (L) 23 - 29 mmol/L    BUN 40 (H) 8 - 23 mg/dL    Creatinine 1.9 (H) 0.5 - 1.4 mg/dL    Calcium 8.7 8.7 - 10.5 mg/dL    Anion Gap 13 8 - 16 mmol/L     Recent Results (from the past 336 hour(s))   CBC auto differential    Collection Time: 01/18/22  2:15 AM   Result Value Ref Range    WBC 4.89 3.90 - 12.70 K/uL    Hemoglobin 11.6 (L) 12.0 - 16.0 g/dL    Hematocrit 35.2 (L) 37.0 - 48.5 %    Platelets 253 150 - 450 K/uL     Urinalysis  Recent Labs   Lab 01/20/22  0835   COLORU Yellow   SPECGRAV 1.015   PHUR 6.0    PROTEINUA 1+*   BACTERIA Moderate*   NITRITE Negative   LEUKOCYTESUR 3+*   UROBILINOGEN Negative   HYALINECASTS 1       Diagnostic Results:  X-Ray: Reviewed  US: Reviewed  Echo: Reviewed  ACCESS    ASSESSMENT/PLAN:     LOREN/ CKD 4  Diabetes Mellitus  Hb A1c 8.4  Probably Diabetic Nephropathy  Proteinuria nephrotic range 3 g - 11/22/21  UA protein/creatinine 3.65 g 1/18/22  UA protein 1+, RBC 2, glucose 23+, WBC >100.  UA culture no growth  Repeat UA culture  On ceftriaxone  US 2019  Sonographic findings compatible with chronic medical renal disease.  No hydronephrosis.  GFR 28 cc/min  Creatinine 1.7  BUN 38  K 4.3  Metabolic acidosis  Metabolic bone disease  Secondary hyperparathyroidism  Vit D  Poor nutrition   Albumin 2.9  Anemia multifactorial Echo  · Mild left atrial enlargement.  · There is left ventricular eccentric hypertrophy.  · The left ventricle is normal in size with normal systolic function. The estimated ejection fraction is 60%.  · Indeterminate diastolic function.  · Normal right ventricular size with normal right ventricular systolic function.  · The estimated PA systolic pressure is 31 mmHg.  · Normal central venous pressure (3 mmHg).  ·    · Blood pressure 139/65  Weight daily   I and O  Avoid nephrotoxic agents, hypotension, hypovolemia      Renal, ADA diet      Wound care       PT

## 2022-01-20 NOTE — CONSULTS
Thank you for your consult to [facility]. We have reviewed the patient chart. This patient does meet criteria for [facility] service at this time. Will assume care on 01/20/22 at 7AM     Jose Gates MD  [facility]

## 2022-01-20 NOTE — HOSPITAL COURSE
Ms. Ortiz was admitted to Hospital Medicine for management of pyelo.  She was started on Ceftriaxone.  Urine culture returned with Klebsiella, Enterobacter and Proteus.  Blood cx returned positive, but are likely to be a contaminant.  She was discharged on Cefpodoxime to complete a 14 day course.  PT/OT say HH.

## 2022-01-21 ENCOUNTER — PATIENT OUTREACH (OUTPATIENT)
Dept: ADMINISTRATIVE | Facility: OTHER | Age: 77
End: 2022-01-21
Payer: MEDICARE

## 2022-01-21 VITALS
SYSTOLIC BLOOD PRESSURE: 142 MMHG | RESPIRATION RATE: 17 BRPM | OXYGEN SATURATION: 100 % | BODY MASS INDEX: 41.32 KG/M2 | HEART RATE: 64 BPM | DIASTOLIC BLOOD PRESSURE: 63 MMHG | WEIGHT: 263.25 LBS | TEMPERATURE: 97 F | HEIGHT: 67 IN

## 2022-01-21 LAB
BACTERIA BLD CULT: ABNORMAL
PATHOLOGIST INTERPRETATION IFE: NORMAL
POCT GLUCOSE: 108 MG/DL (ref 70–110)
POCT GLUCOSE: 130 MG/DL (ref 70–110)

## 2022-01-21 PROCEDURE — 25000003 PHARM REV CODE 250: Mod: HCNC | Performed by: INTERNAL MEDICINE

## 2022-01-21 PROCEDURE — 94761 N-INVAS EAR/PLS OXIMETRY MLT: CPT | Mod: HCNC

## 2022-01-21 PROCEDURE — A4216 STERILE WATER/SALINE, 10 ML: HCPCS | Mod: HCNC | Performed by: NURSE PRACTITIONER

## 2022-01-21 PROCEDURE — 63600175 PHARM REV CODE 636 W HCPCS: Mod: HCNC | Performed by: HOSPITALIST

## 2022-01-21 PROCEDURE — 63600175 PHARM REV CODE 636 W HCPCS: Mod: HCNC | Performed by: NURSE PRACTITIONER

## 2022-01-21 PROCEDURE — 25000003 PHARM REV CODE 250: Mod: HCNC | Performed by: NURSE PRACTITIONER

## 2022-01-21 RX ORDER — ERGOCALCIFEROL 1.25 MG/1
50000 CAPSULE ORAL
Status: DISCONTINUED | OUTPATIENT
Start: 2022-01-21 | End: 2022-01-21 | Stop reason: HOSPADM

## 2022-01-21 RX ORDER — ERGOCALCIFEROL 1.25 MG/1
50000 CAPSULE ORAL
Qty: 30 CAPSULE | Refills: 1 | Status: SHIPPED | OUTPATIENT
Start: 2022-01-21 | End: 2022-05-17 | Stop reason: SDUPTHER

## 2022-01-21 RX ADMIN — Medication 10 ML: at 05:01

## 2022-01-21 RX ADMIN — LACTOBACILLUS TAB 1 TABLET: TAB at 09:01

## 2022-01-21 RX ADMIN — MUPIROCIN: 20 OINTMENT TOPICAL at 09:01

## 2022-01-21 RX ADMIN — CLOPIDOGREL 75 MG: 75 TABLET, FILM COATED ORAL at 09:01

## 2022-01-21 RX ADMIN — ASPIRIN 81 MG CHEWABLE TABLET 81 MG: 81 TABLET CHEWABLE at 09:01

## 2022-01-21 RX ADMIN — NIFEDIPINE 60 MG: 30 TABLET, FILM COATED, EXTENDED RELEASE ORAL at 09:01

## 2022-01-21 RX ADMIN — HEPARIN SODIUM 5000 UNITS: 5000 INJECTION INTRAVENOUS; SUBCUTANEOUS at 05:01

## 2022-01-21 RX ADMIN — MICONAZOLE NITRATE 2 % TOPICAL POWDER: at 09:01

## 2022-01-21 RX ADMIN — CEFTRIAXONE 1 G: 1 INJECTION, SOLUTION INTRAVENOUS at 05:01

## 2022-01-21 NOTE — DISCHARGE SUMMARY
[facility]  Discharge Summary      Patient Name: Sherin Ortiz  MRN: [id number]  Patient Class: IP- Inpatient  Admission Date: 1/18/2022  Hospital Length of Stay: 2 days  Discharge Date and Time:  01/21/2022 10:55 AM  Attending Physician: Victoria Lopez MD   Discharging Provider: Victoria Lopez MD  Primary Care Provider: Deedee Calderon MD      HPI:   Sherin Ortiz is a 77 y.o. 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), Benign essential hypertension (11/14/2012), Cataract, Chronic kidney disease (CKD), stage III (moderate) (9/12/2013), Diabetic peripheral neuropathy associated with type 2 diabetes mellitus (11/14/2014), Gait disorder, Hyperlipidemia, Iritis - Both Eyes (6/10/2013), Kidney stone, Lymphedema, Morbid obesity with BMI of 40.0-44.9, adult (2/18/2015), Nephrolithiasis (4/20/2016), NS (nuclear sclerosis) (4/1/2013), Nuclear sclerosis - Both Eyes (4/29/2013), Preseptal cellulitis - Right Eye (4/29/2013), Proliferative diabetic retinopathy - Both Eyes (4/29/2013), Proliferative diabetic retinopathy, both eyes (4/1/2013), PSC (posterior subcapsular cataract) - Both Eyes (4/29/2013), S/P hernia repair (12/19/2012), TIA (transient ischemic attack) (11/18/2014), Tinea pedis (7/24/2012), Type 2 diabetes mellitus with diabetic polyneuropathy, with long-term current use of insulin (9/18/2015), Type 2 diabetes mellitus with renal manifestations, controlled (12/12/2013), Type 2 diabetes, controlled, with moderate nonproliferative diabetic retinopathy without macular edema (9/17/2015), Ulcer of left lower extremity, limited to breakdown of skin (7/8/2015), Unspecified cerebral artery occlusion with cerebral infarction (11/16/2014), Unspecified venous (peripheral) insufficiency, Ureteral stone with hydronephrosis (1/27/2016), UTI (lower urinary tract infection), Vaginal infection, and 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      The patient presented to the ED due to low back pain, dysuria, abdominal pain, and decreased appetite. When re interviewed she states she never has symptoms such as dysuria or frequency when she has UTIs so that is why she never knows when she has an infection. Patient's other symptoms have been going on approximately 1 week.  On January 14, 2022 patient was prescribed doxycycline for presumed UTI.  She reports persistent pain since then.  She has not tried taking any pain medication. Denies history of trauma incontinence numbness or weakness. She also reports an infection to her lower extremities and will underwent wound care food dressed her wounds today. In ED labs remarkable for UTI on UA and LOREN on CKD. There is urinary bladder wall thickening and perivesicular haziness, correlation for UTI/cystitis is needed. CT abd/pelvis:There is mild prominence of the middle pole calyx of the left kidney, without additional evidence for hydronephrosis, and without evidence for ureteral calculus or obstructive uropathy otherwise seen, this may relate to sequelae of a recently passed calculus, may relate to variant anatomy, correlation for UTI/pyelonephritis is needed, note is made there does not appear to be significant perinephric inflammatory change. Cystic lesion of the right adnexa again noted, stable appearance, clinical and historical correlation is needed. Bilateral adrenal nodules, likely represent adrenal adenomas. Started on Rocephin. Admitted to [facility] for further care.      * No surgery found *      Hospital Course:   Ms. Ortiz was admitted to [facility] for management of pyelo.  She was started on Ceftriaxone.  Urine culture returned with Klebsiella, Enterobacter and Proteus.  Blood cx returned positive, but are likely to be a contaminant.  She was discharged on Cefpodoxime to complete a 14 day course.  PT/OT say HH.       Goals of Care Treatment Preferences:  Code Status: Full  "Code      Consults:   Consults (From admission, onward)        Status Ordering Provider     Pharmacy to dose Vancomycin consult  Once        Provider:  (Not yet assigned)   "And" Linked Group Details    Acknowledged HENOK JADE     Inpatient virtual consult to Hospital Medicine  Once        Provider:  (Not yet assigned)    Completed HENOK JADE     Inpatient virtual consult to Hospital Medicine  Once        Provider:  (Not yet assigned)    Completed HENOK JADE     Inpatient consult to [facility]  Once        Provider:  Kate Oliver MD    Completed TYSON SALAZAR          No new Assessment & Plan notes have been filed under this hospital service since the last note was generated.  Service: Hospital Medicine    Final Active Diagnoses:    Diagnosis Date Noted POA    PRINCIPAL PROBLEM:  Pyelonephritis [N12] 01/18/2022 Yes    Obesity, diabetes, and hypertension syndrome [E11.69, E66.9, E11.59, I15.2] 01/20/2022 Yes    Adrenal nodule [E27.8] 01/18/2022 Yes    Venous insufficiency of both lower extremities [I87.2]  Yes    Obesity, morbid (more than 100 lbs over ideal weight or BMI > 40) [E66.01]  Yes    Venous ulcer of both lower extremities with varicose veins [I83.019, I83.029, L97.919, L97.929] 09/22/2021 Yes    Benign hypertensive heart and kidney disease with HF and CKD [I13.0] 01/13/2021 Yes    (HFpEF) heart failure with preserved ejection fraction [I50.30] 01/22/2018 Yes    Aortic atherosclerosis [I70.0] 07/06/2017 Yes    PAOD (peripheral arterial occlusive disease) [I77.9] 07/06/2017 Yes    Type 2 diabetes mellitus with diabetic polyneuropathy, with long-term current use of insulin [E11.42, Z79.4] 09/18/2015 Not Applicable     Chronic    Anemia of chronic disease [D63.8] 09/18/2015 Yes     Chronic    Acute renal failure superimposed on stage 4 chronic kidney disease [N17.9, N18.4] 09/12/2013 Yes    Debility " "[R53.81] 12/19/2012 Yes     Chronic    Essential hypertension [I10] 11/14/2012 Yes     Chronic    Hyperlipidemia LDL goal <100 [E78.5] 08/14/2012 Yes     Chronic    Lymphedema of both lower extremities [I89.0] 07/24/2012 Yes      Problems Resolved During this Admission:       Discharged Condition: good    Disposition: Home or Self Care    Follow Up:   Follow-up Information     Jody Murray NP On 1/24/2022.    Specialty: Nephrology  Why: 8:00 am  --   Contact information:  [address]  [facility] [address]  [phone]             [facility] On 1/27/2022.    Specialty: Wound Care  Why: 2:00pm  Contact information:  [address]  [facility] [id number] [phone]  Additional information:  Please park in Lot C or D and use the [address] entrance. Check in at [facility] [address].           Adrienne Chisholm MD On 2/4/2022.    Specialty: Internal Medicine  Why: 8:45 am   Contact information:  [address]  [address]  [facility] [address]  [phone]             Christian Bishop MD On 2/4/2022.    Specialties: Interventional Cardiology, Cardiology  Why: 10:00 am    Contact information:  [address]  [address]  [facility] [address]  [phone]             [facility].    Specialty: Home Health Services  Why: Home Health  Contact information:  [address]  [address]  [facility] [address]  [phone]                       Patient Instructions:      SLIDING BOARD FOR HOME USE     Order Specific Question Answer Comments   Height: 5' 7" (1.702 m)    Weight: 119.7 kg (263 lb 14.3 oz)    Does patient have medical equipment at home? bedside commode    Does patient have medical equipment at home? shower chair    Does patient have medical equipment at home? power chair    Length of need (1-99 months): 99    Reason for Sliding Board: Patient has a wheelchair    Need for transfer? Yes    Is the patient non-ambulatory? Yes  "   Is the patient chair bound? Yes    Is the patient bed bound? No      Ambulatory referral/consult to Nephrology   Standing Status: Future   Referral Priority: Routine Referral Type: Consultation   Referral Reason: Specialty Services Required   Requested Specialty: Nephrology   Number of Visits Requested: 1     Diet renal     Notify your health care provider if you experience any of the following:  temperature >100.4     Notify your health care provider if you experience any of the following:  persistent nausea and vomiting or diarrhea     Notify your health care provider if you experience any of the following:  severe uncontrolled pain     Notify your health care provider if you experience any of the following:  difficulty breathing or increased cough     Notify your health care provider if you experience any of the following:  severe persistent headache     Notify your health care provider if you experience any of the following:  persistent dizziness, light-headedness, or visual disturbances     Notify your health care provider if you experience any of the following:  increased confusion or weakness     Activity as tolerated       Significant Diagnostic Studies: Microbiology:   Urine Culture    Lab Results   Component Value Date    LABURIN No growth 01/18/2022       Pending Diagnostic Studies:     None         Medications:  Reconciled Home Medications:      Medication List      START taking these medications    cefpodoxime 200 MG tablet  Commonly known as: VANTIN  Take 1 tablet (200 mg total) by mouth 2 (two) times daily. for 13 days        CHANGE how you take these medications    LANTUS U-100 INSULIN 100 unit/mL injection  Generic drug: insulin glargine  Inject 10-15 Units into the skin every evening. DEPENDING ON SCALE (DISCARD EACH VIAL AFTER 28 DAYS)  What changed:   · how much to take  · how to take this  · when to take this  · additional instructions        CONTINUE taking these medications    acetaminophen  "325 MG tablet  Commonly known as: TYLENOL  Take 2 tablets (650 mg total) by mouth every 6 (six) hours as needed for Pain.     alcohol swabs Padm  Commonly known as: BD ALCOHOL SWABS  Apply 1 each topically 2 (two) times daily.     aspirin 81 MG Chew  Take 81 mg by mouth once daily.     atorvastatin 40 MG tablet  Commonly known as: LIPITOR  TAKE 1 TABLET EVERY EVENING     BD INSULIN SYRINGE ULTRA-FINE 0.5 mL 30 gauge x 1/2" Syrg  Generic drug: insulin syringe-needle U-100  USE TO INJECT EVERY NIGHT     blood sugar diagnostic Strp  Commonly known as: TRUE METRIX GLUCOSE TEST STRIP  Test twice daily     clopidogreL 75 mg tablet  Commonly known as: PLAVIX  Take 1 tablet (75 mg total) by mouth once daily.     DROPLET INSULIN SYR(HALF UNIT) 0.5 mL 30 gauge x 1/2" Syrg  Generic drug: insulin syr/ndl U100 half yesenia  USE TO INJECT EVERY NIGHT     gentamicin 0.3 % ophthalmic solution  Commonly known as: GARAMYCIN  Place 1 drop into both eyes 3 (three) times daily.     lancets 33 gauge Misc  Commonly known as: TRUEPLUS LANCETS  1 lancet by Misc.(Non-Drug; Combo Route) route 2 (two) times a day.     miconazole nitrate 2% 2 % Oint  Commonly known as: MICOTIN  Apply topically 2 (two) times daily.     NIFEdipine 60 MG (OSM) 24 hr tablet  Commonly known as: PROCARDIA-XL  Take 1 tablet (60 mg total) by mouth once daily.     triamcinolone acetonide 0.1% 0.1 % cream  Commonly known as: KENALOG  Apply topically 2 (two) times daily.     TRUE METRIX LEVEL 3 Soln  Generic drug: blood glucose control, high  Used to calibrate weekly        STOP taking these medications    doxycycline 100 MG tablet  Commonly known as: VIBRA-TABS     ketoconazole 2 % cream  Commonly known as: NIZORAL            Indwelling Lines/Drains at time of discharge:   Lines/Drains/Airways     Drain            Female External Urinary Catheter 01/18/22 1440 2 days                Time spent on the discharge of patient: 35 minutes    Victoria Lopez MD  Department of " [facility]

## 2022-01-21 NOTE — ASSESSMENT & PLAN NOTE
c/o back pain and decreased appetite lasting approximately 1 week.    History of renal stones with stenting and Ecoli  1/14/22 was prescribed doxycycline for presumed UTI.  She reports persistent pain since then.    UA positive  CT abd/pelvis: There is urinary bladder wall thickening and perivesicular haziness, correlation for UTI/cystitis is needed. There is mild prominence of the middle pole calyx of the left kidney, without additional evidence for hydronephrosis, and without evidence for ureteral calculus or obstructive uropathy otherwise seen, this may relate to sequelae of a recently passed calculus, may relate to variant anatomy, correlation for UTI/pyelonephritis is needed, note is made there does not appear to be significant perinephric inflammatory change. Cystic lesion of the right adnexa again noted, stable appearance, clinical and historical correlation is needed. Bilateral adrenal nodules, likely represent adrenal adenomas.  -Started on rocephin in ED-continue for now  -previous culture growing Proteus; she was treated as outpatient with doxycycline  -ideally would treat with fluoroquinolone or penicillin, but previous results resistant to fluoroquinolone and patient reports allergies to penicillin and sulfa medications  Plan to continue Ceftriaxone today and DC on Cefpodoxime

## 2022-01-21 NOTE — SUBJECTIVE & OBJECTIVE
Interval History: No acute events overnight.  Subsequent cultures negative.  Feels well.  Ready to go home.    Review of Systems   Constitutional: Negative for chills, fatigue and fever.   Respiratory: Negative for cough and shortness of breath.    Cardiovascular: Negative for chest pain, palpitations and leg swelling.   Gastrointestinal: Negative for abdominal pain, diarrhea, nausea and vomiting.   Genitourinary: Negative for dysuria, flank pain, frequency and urgency.   Neurological: Negative for dizziness and headaches.   All other systems reviewed and are negative.    Objective:     Vital Signs (Most Recent):  Temp: 97 °F (36.1 °C) (01/21/22 0802)  Pulse: 61 (01/21/22 0802)  Resp: 17 (01/21/22 0802)  BP: (!) 143/67 (01/21/22 0802)  SpO2: 98 % (01/21/22 0802) Vital Signs (24h Range):  Temp:  [96.5 °F (35.8 °C)-97.9 °F (36.6 °C)] 97 °F (36.1 °C)  Pulse:  [61-68] 61  Resp:  [17-18] 17  SpO2:  [93 %-99 %] 98 %  BP: (139-154)/(65-72) 143/67     Weight: 119.4 kg (263 lb 3.7 oz)  Body mass index is 41.23 kg/m².    Intake/Output Summary (Last 24 hours) at 1/21/2022 1052  Last data filed at 1/21/2022 0607  Gross per 24 hour   Intake 415.29 ml   Output 1400 ml   Net -984.71 ml      Physical Exam  Vitals reviewed.   Constitutional:       General: She is not in acute distress.     Appearance: Normal appearance. She is not ill-appearing or toxic-appearing.   HENT:      Head: Normocephalic and atraumatic.   Eyes:      General: No scleral icterus.     Conjunctiva/sclera: Conjunctivae normal.   Pulmonary:      Effort: Pulmonary effort is normal. No respiratory distress.   Skin:     Coloration: Skin is not jaundiced.      Findings: No erythema.   Neurological:      General: No focal deficit present.      Mental Status: She is alert and oriented to person, place, and time.   Psychiatric:         Mood and Affect: Mood normal.         Behavior: Behavior normal.         Significant Labs: All pertinent labs within the past 24 hours  have been reviewed.    Significant Imaging: I have reviewed all pertinent imaging results/findings within the past 24 hours.

## 2022-01-21 NOTE — NURSING
IV removed with catheter tip intact, pressure dressing applied. AVS given to pt, reviewed with daughter via phone and verbalizes understanding. Slide board delivered to bedside, payment arrangement/ plans made by daughter for prescription from [facility]. NAD noted. Pt awaiting wheelchair transport by pt escort.

## 2022-01-21 NOTE — PROGRESS NOTES
[facility]  Telemedicine Progress Note    Patient Name: Sherin Ortiz  MRN: [id number]  Patient Class: IP- Inpatient   Admission Date: 1/18/2022  Length of Stay: 2 days  Attending Physician: Victoria Lopez MD  Primary Care Provider: Deedee Calderon MD          Subjective:     Principal Problem:Pyelonephritis        HPI:  Sherin Ortiz is a 77 y.o. 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), Benign essential hypertension (11/14/2012), Cataract, Chronic kidney disease (CKD), stage III (moderate) (9/12/2013), Diabetic peripheral neuropathy associated with type 2 diabetes mellitus (11/14/2014), Gait disorder, Hyperlipidemia, Iritis - Both Eyes (6/10/2013), Kidney stone, Lymphedema, Morbid obesity with BMI of 40.0-44.9, adult (2/18/2015), Nephrolithiasis (4/20/2016), NS (nuclear sclerosis) (4/1/2013), Nuclear sclerosis - Both Eyes (4/29/2013), Preseptal cellulitis - Right Eye (4/29/2013), Proliferative diabetic retinopathy - Both Eyes (4/29/2013), Proliferative diabetic retinopathy, both eyes (4/1/2013), PSC (posterior subcapsular cataract) - Both Eyes (4/29/2013), S/P hernia repair (12/19/2012), TIA (transient ischemic attack) (11/18/2014), Tinea pedis (7/24/2012), Type 2 diabetes mellitus with diabetic polyneuropathy, with long-term current use of insulin (9/18/2015), Type 2 diabetes mellitus with renal manifestations, controlled (12/12/2013), Type 2 diabetes, controlled, with moderate nonproliferative diabetic retinopathy without macular edema (9/17/2015), Ulcer of left lower extremity, limited to breakdown of skin (7/8/2015), Unspecified cerebral artery occlusion with cerebral infarction (11/16/2014), Unspecified venous (peripheral) insufficiency, Ureteral stone with hydronephrosis (1/27/2016), UTI (lower urinary tract infection), Vaginal infection, and 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     The patient presented to  the ED due to low back pain, dysuria, abdominal pain, and decreased appetite. When re interviewed she states she never has symptoms such as dysuria or frequency when she has UTIs so that is why she never knows when she has an infection. Patient's other symptoms have been going on approximately 1 week.  On January 14, 2022 patient was prescribed doxycycline for presumed UTI.  She reports persistent pain since then.  She has not tried taking any pain medication. Denies history of trauma incontinence numbness or weakness. She also reports an infection to her lower extremities and will underwent wound care food dressed her wounds today. In ED labs remarkable for UTI on UA and LOREN on CKD. There is urinary bladder wall thickening and perivesicular haziness, correlation for UTI/cystitis is needed. CT abd/pelvis:There is mild prominence of the middle pole calyx of the left kidney, without additional evidence for hydronephrosis, and without evidence for ureteral calculus or obstructive uropathy otherwise seen, this may relate to sequelae of a recently passed calculus, may relate to variant anatomy, correlation for UTI/pyelonephritis is needed, note is made there does not appear to be significant perinephric inflammatory change. Cystic lesion of the right adnexa again noted, stable appearance, clinical and historical correlation is needed. Bilateral adrenal nodules, likely represent adrenal adenomas. Started on Rocephin. Admitted to [facility] for further care.      Overview/Hospital Course:  Ms. Ortiz was admitted to Hospital Medicine for management of pyelo.  She was started on Ceftriaxone.  Urine culture returned with Klebsiella, Enterobacter and Proteus.  Blood cx returned positive, but are likely to be a contaminant.  PT/OT say HH.      Interval History: No acute events overnight.  Subsequent cultures negative.  Feels well.  Ready to go home.    Review of Systems   Constitutional: Negative for chills,  fatigue and fever.   Respiratory: Negative for cough and shortness of breath.    Cardiovascular: Negative for chest pain, palpitations and leg swelling.   Gastrointestinal: Negative for abdominal pain, diarrhea, nausea and vomiting.   Genitourinary: Negative for dysuria, flank pain, frequency and urgency.   Neurological: Negative for dizziness and headaches.   All other systems reviewed and are negative.    Objective:     Vital Signs (Most Recent):  Temp: 97 °F (36.1 °C) (01/21/22 0802)  Pulse: 61 (01/21/22 0802)  Resp: 17 (01/21/22 0802)  BP: (!) 143/67 (01/21/22 0802)  SpO2: 98 % (01/21/22 0802) Vital Signs (24h Range):  Temp:  [96.5 °F (35.8 °C)-97.9 °F (36.6 °C)] 97 °F (36.1 °C)  Pulse:  [61-68] 61  Resp:  [17-18] 17  SpO2:  [93 %-99 %] 98 %  BP: (139-154)/(65-72) 143/67     Weight: 119.4 kg (263 lb 3.7 oz)  Body mass index is 41.23 kg/m².    Intake/Output Summary (Last 24 hours) at 1/21/2022 1052  Last data filed at 1/21/2022 0607  Gross per 24 hour   Intake 415.29 ml   Output 1400 ml   Net -984.71 ml      Physical Exam  Vitals reviewed.   Constitutional:       General: She is not in acute distress.     Appearance: Normal appearance. She is not ill-appearing or toxic-appearing.   HENT:      Head: Normocephalic and atraumatic.   Eyes:      General: No scleral icterus.     Conjunctiva/sclera: Conjunctivae normal.   Pulmonary:      Effort: Pulmonary effort is normal. No respiratory distress.   Skin:     Coloration: Skin is not jaundiced.      Findings: No erythema.   Neurological:      General: No focal deficit present.      Mental Status: She is alert and oriented to person, place, and time.   Psychiatric:         Mood and Affect: Mood normal.         Behavior: Behavior normal.         Significant Labs: All pertinent labs within the past 24 hours have been reviewed.    Significant Imaging: I have reviewed all pertinent imaging results/findings within the past 24 hours.      Assessment/Plan:      *  Pyelonephritis  c/o back pain and decreased appetite lasting approximately 1 week.    History of renal stones with stenting and Ecoli  1/14/22 was prescribed doxycycline for presumed UTI.  She reports persistent pain since then.    UA positive  CT abd/pelvis: There is urinary bladder wall thickening and perivesicular haziness, correlation for UTI/cystitis is needed. There is mild prominence of the middle pole calyx of the left kidney, without additional evidence for hydronephrosis, and without evidence for ureteral calculus or obstructive uropathy otherwise seen, this may relate to sequelae of a recently passed calculus, may relate to variant anatomy, correlation for UTI/pyelonephritis is needed, note is made there does not appear to be significant perinephric inflammatory change. Cystic lesion of the right adnexa again noted, stable appearance, clinical and historical correlation is needed. Bilateral adrenal nodules, likely represent adrenal adenomas.  -Started on rocephin in ED-continue for now  -previous culture growing Proteus; she was treated as outpatient with doxycycline  -ideally would treat with fluoroquinolone or penicillin, but previous results resistant to fluoroquinolone and patient reports allergies to penicillin and sulfa medications  Plan to continue Ceftriaxone today and DC on Cefpodoxime    Obesity, diabetes, and hypertension syndrome  As above      Adrenal nodule        Venous insufficiency of both lower extremities        Obesity, morbid (more than 100 lbs over ideal weight or BMI > 40)  Body mass index is 41.23 kg/m².  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 CHF is currently controlled. Latest ECHO performed and  demonstrates- Results for orders placed during the hospital encounter of 11/21/20    Echo Color Flow Doppler? Yes    Interpretation Summary  · Mild left atrial enlargement.  · There is left ventricular eccentric hypertrophy.  · The left ventricle is normal in size with normal systolic function. The estimated ejection fraction is 60%.  · Indeterminate diastolic function.  · Normal right ventricular size with normal right ventricular systolic function.  · The estimated PA systolic pressure is 31 mmHg.  · Normal central venous pressure (3 mmHg).  . Continue home meds and monitor clinical status closely. Monitor on telemetry. Patient is off CHF pathway.  Monitor strict Is&Os and daily weights.  Place on fluid restriction of 1.5 L. Continue to stress to patient importance of self efficacy and  on diet for CHF. Last BNP reviewed- and noted below No results for input(s): BNP, BNPTRIAGEBLO in the last 168 hours..          PAOD (peripheral arterial occlusive disease)  DAPT, statin      Aortic atherosclerosis  Continue ASA, statin      Anemia of chronic disease  Stable  monitor      Type 2 diabetes mellitus with diabetic polyneuropathy, with long-term current use of insulin  Hemoglobin A1C   Date Value Ref Range Status   01/18/2022 8.4 (H) 4.0 - 5.6 % Final   -SSI  -detemir 10 units nightly  -accuchecks ACHS  -diabetic diet        Acute renal failure superimposed on stage 4 chronic kidney disease  Baseline Cr 1.6-1.8, was 2.2  -suspect prerenal LOREN in setting of UTI  -given 500 cc LR bolus  -avoid nephrotoxic meds  -renal dose meds  -avoid events causing decreased renal perfusion   -appears to be back towards baseline; will need outpatient Nephrology follow-up      Debility  Wheelchair dependent  Inability to walk  Fall precautions      Essential hypertension  Chronic issue  Restart Procardia    Hyperlipidemia LDL goal <100  Continue home atorvastatin      Lymphedema of both lower extremities  Venous ulcer of both  lower extremities with varicose veins  Venous insufficiency of both lower extremities  Camilo hose present, some erythema is noted, venous stasis skin changes noted  Patient reports having recent skin infection, wound care came yesterday and does not wish to unwrap legs for assessment  -Wound care consult      VTE Risk Mitigation (From admission, onward)         Ordered     heparin (porcine) injection 5,000 Units  Every 8 hours         01/18/22 0317     IP VTE HIGH RISK PATIENT  Once         01/18/22 0317     Place sequential compression device  Until discontinued         01/18/22 0317                      I have assessed these finding virtually using telemed platform and with assistance of bedside nurse                 The attending portion of this evaluation, treatment, and documentation was performed per Victoria Lopez MD via Telemedicine AudioVisual using the secure V[employer] software platform with 2 way audio/video. The provider was located off-site and the patient is located in the hospital. The aforementioned video software was utilized to document the relevant history and physical exam    Victoria Lopez MD  Department of Hospital Medicine   [facility]

## 2022-01-21 NOTE — PROGRESS NOTES
Wound care follow up:  Compression wraps and wound dressing changed to BLE as directed. Pt tolerated procedure well.  Notified Nurse Litzy.

## 2022-01-21 NOTE — PLAN OF CARE
Resting on waffle mattressAAO,meds admin per mar,denies c/o pain,leg dressings intact,bed alarm set.

## 2022-01-21 NOTE — PROGRESS NOTES
JONELLEW contacted pt via room phone to discuss discharge and additional social barriers to care. Pt did not identify any additional social barriers to care at this time. Per pt, pt is not in need of resources at this time.    The following were addressed during this visit:  - Complete follow-up visit with patient    ARIANNA Reid

## 2022-01-21 NOTE — PROGRESS NOTES
Future Appointments   Date Time Provider Department Center   1/24/2022  8:00 AM Jody Murray NP [facility] NEPHRO [facility]   1/27/2022  2:00 PM Gretta Perry DO [facility] WOUND [facility]   2/4/2022  8:45 AM Adrienne Chisholm MD [facility] IMPRI [facility]   2/4/2022 10:00 AM Christian Bishop MD [facility] CARDIO [facility]   3/8/2022  2:30 PM Deedee Calderon MD [facility] IM [facility]

## 2022-01-21 NOTE — PROGRESS NOTES
IP Liaison - Final Visit Note    Patient: Sherin Ortiz  MRN:  [id number]  Date of Service:  1/21/2022  Completed by:  ARIANNA Reid    Reason for Visit   Patient presents with    IP Liaison Chart Review        Patient Summary     Discharge Date: 01/21/2022  Discharge telephone number/address: [phone] / [address] [id number]  Follow up provider: Jody Murray NP / Adrienne Chisholm MD  Follow up appointments: 1/24/2022 @ 8:00am / 2/4/2022 @ 8:45am  Home Health agency & telephone number: [facility]  DME ordered &  name: Sliding board  Assigned OPCM RN/SW: Ondina Franco RN  Report sent to follow up team (PCP/OPCM) via in basket message: n/a  Community Resources arranged including agency name & contact info: Referred pt to [facility] ([facility])      ARIANNA Reid

## 2022-01-21 NOTE — PROGRESS NOTES
Pharmacokinetic Assessment Follow Up: IV Vancomycin    Vancomycin serum concentration assessment(s):    The random level was drawn correctly and can be used to guide therapy at this time. The measurement is below the desired definitive target range of 15 to 20 mcg/mL.    Vancomycin Regimen Plan:    Re-dose when the random level is less than 20 mcg/mL, next level to be drawn at 1/21/22 on 2200    Drug levels (last 3 results):  Recent Labs   Lab Result Units 01/20/22  1906   Vancomycin, Random ug/mL 15.5       Pharmacy will continue to follow and monitor vancomycin.    Please contact pharmacy at extension [phone] for questions regarding this assessment.    Thank you for the consult,   Justin Dominguez       Patient brief summary:  Sherin Ortiz is a 77 y.o. female initiated on antimicrobial therapy with IV Vancomycin for treatment of bacteremia    The patient's current regimen is pulse dosing give vanco 1500mg once    Drug Allergies:   Review of patient's allergies indicates:   Allergen Reactions    Penicillins Hives     Other reaction(s): Hives  Patient has received cefdinir, ceftriaxone, cefazolin and cefepime in the past with no documented reactions    Sulfa (sulfonamide antibiotics) Other (See Comments)     Eyad, pt states her doctor told her the shakes were possibly caused by an allergy to sulfa       Actual Body Weight:   119kg    Renal Function:   Estimated Creatinine Clearance: 35.9 mL/min (A) (based on SCr of 1.7 mg/dL (H)).,     Dialysis Method (if applicable):  N/A    CBC (last 72 hours):  Recent Labs   Lab Result Units 01/18/22  0215   WBC K/uL 4.89   Hemoglobin g/dL 11.6*   Hemoglobin A1C % 8.4*   Hematocrit % 35.2*   Platelets K/uL 253   Gran % % 64.2   Lymph % % 25.6   Mono % % 8.4   Eosinophil % % 0.8   Basophil % % 0.6   Differential Method  Automated       Metabolic Panel (last 72 hours):  Recent Labs   Lab Result Units 01/18/22  0215 01/18/22  0238 01/19/22  0454 01/20/22  0457 01/20/22  0835    Sodium mmol/L 139  --  140 140  --    Potassium mmol/L 4.9  --  4.7 4.3  --    Chloride mmol/L 107  --  108 110  --    CO2 mmol/L 17*  --  19* 22*  --    Glucose mg/dL 181*  --  164* 108  --    Glucose, UA   --  3+*  --   --  2+*   BUN mg/dL 40*  --  40* 38*  --    Creatinine mg/dL 2.2*  --  1.9* 1.7*  --    Creatinine, Urine mg/dL  --  77.6  77.6  --   --   --    Albumin g/dL 2.9*  --   --   --   --    Total Bilirubin mg/dL 0.6  --   --   --   --    Alkaline Phosphatase U/L 207*  --   --   --   --    AST U/L 17  --   --   --   --    ALT U/L 15  --   --   --   --        Vancomycin Administrations:  vancomycin given in the last 96 hours                     vancomycin (VANCOCIN) 2,000 mg in dextrose 5 % 500 mL IVPB (mg) 2,000 mg New Bag 01/19/22 2000                    Microbiologic Results:  Microbiology Results (last 7 days)       Procedure Component Value Units Date/Time    Blood culture [[id number]]  (Abnormal) Collected: 01/18/22 0450    Order Status: Completed Specimen: Blood from Peripheral, Forearm, Right Updated: 01/20/22 1250     Blood Culture, Routine Gram stain aer bottle: Gram positive rods       Results called to and read back by: Cherelle Olvera RN 01/19/2022  10:57      DIPHTHEROIDS  Susceptibility testing not routinely performed      Blood culture [[id number]] Collected: 01/19/22 1902    Order Status: Completed Specimen: Blood Updated: 01/20/22 0915     Blood Culture, Routine No Growth to date    Blood culture [[id number]] Collected: 01/19/22 1901    Order Status: Completed Specimen: Blood Updated: 01/20/22 0915     Blood Culture, Routine No Growth to date    Blood Culture #1 **CANNOT BE ORDERED STAT** [[id number]] Collected: 01/18/22 0450    Order Status: Completed Specimen: Blood from Peripheral, Forearm, Right Updated: 01/20/22 0807     Blood Culture, Routine Gram stain shankar bottle: Gram positive cocci in clusters resembling Staph       Results called to and read back by: Delores Olvera RN 01/19/2022   18:40    Urine Culture High Risk [[id number]] Collected: 01/18/22 1652    Order Status: Completed Specimen: Urine, Clean Catch Updated: 01/20/22 0753     Urine Culture, Routine No growth    Narrative:      Indicated criteria for high risk culture:->Other  Other (specify):->pyelo

## 2022-01-21 NOTE — PROGRESS NOTES
Reached out to pt son who states that he's out of town at the moment. Spoke with patient's daughter Jazmyn by phone to inform her of pt upcoming discharge and explained d/c rights.

## 2022-01-21 NOTE — PROGRESS NOTES
Sliding board delivered, patient signed. Informed her that insurance does not pay for that and she will receive a bill.

## 2022-01-22 ENCOUNTER — PATIENT MESSAGE (OUTPATIENT)
Dept: NEPHROLOGY | Facility: CLINIC | Age: 77
End: 2022-01-22
Payer: MEDICARE

## 2022-01-22 LAB
BACTERIA BLD CULT: ABNORMAL

## 2022-01-24 ENCOUNTER — PATIENT MESSAGE (OUTPATIENT)
Dept: NEPHROLOGY | Facility: CLINIC | Age: 77
End: 2022-01-24
Payer: MEDICARE

## 2022-01-24 ENCOUNTER — PATIENT OUTREACH (OUTPATIENT)
Dept: ADMINISTRATIVE | Facility: CLINIC | Age: 77
End: 2022-01-24
Payer: MEDICARE

## 2022-01-24 NOTE — PROGRESS NOTES
C3 nurse spoke with Sherin Ortiz for a TCC post hospital discharge follow up call. The patient has a scheduled HOSFU appointment with SAEID Hancock NP on 2/7/2022 NP @ HOME program.  Appointments have been erroneously cancelled.  Message to Nephrologist HERMES Murray staff to schedule again.

## 2022-01-24 NOTE — PATIENT INSTRUCTIONS
Patient Education       Kidney Infection Discharge Instructions   About this topic   A kidney infection is a kind of urinary tract infection or UTI. Most UTIs are infections in either your bladder or your kidneys. Bladder infections are more common and may also be called cystitis. Kidney infections are more serious and may also be called pyelonephritis. You need antibiotics to treat a UTI. It is important to take all of your antibiotics even if you start to feel better.         What care is needed at home?   · Ask your doctor what you need to do when you go home. Make sure you ask questions if you do not understand what the doctor says.  · For the first day or so, you may want to take an over-the-counter medicine, like phenazopyridine. This will help to numb your bladder. You will also not have the strong urge to urinate. This medicine causes your urine and tears to look orange. If you have kidney disease, talk to your doctor before taking this medicine.  · To lower your chance of getting a UTI in the future, you can:  ? Drink extra fluids.  ? If you have sex, urinate right afterwards.  What follow-up care is needed?   Your doctor may ask you to make visits to the office to check on your progress. Be sure to keep these visits.  What drugs may be needed?   The doctor may order drugs to:  · Help with pain  · Fight an infection  · Help relax the tubes that drain the urine from your body  Be sure to talk to your doctor about all of your drugs if you are pregnant.  Will physical activity be limited?   Physical activities will not be limited. You may have to pass urine more often.  What changes to diet are needed?   · Do not drink beer, wine, and mixed drinks (alcohol) or caffeine. These can bother the bladder.  · Talk to your doctor about drinking cranberry juice.  What can be done to prevent this health problem?   · Pass urine often.  · Wear cotton underwear.  · Women should not wear overly tight underwear or  pants.  · Do not use feminine hygiene sprays or drying soaps.  · Avoid spermicides.  · Gently cleanse your genital area each day. Wipe from front to back to keep germs from going in your body.  · Uncircumcised men should retract their foreskin and gently clean around the head of their penis daily. Then put the foreskin back in place.  · Gently cleanse your genital area before and after having sex.  · Empty your bladder after having sex.  · Empty your bladder before going to sleep.  When do I need to call the doctor?   · You have very bad pain in your back, shoulder, or belly.  · You have a fever of 102.2°F (39°C); shaking chills or sweats even though you are taking antibiotics.  · You have a fever up to 100.4°F (38°C).  · You notice more blood in your urine.  · Your signs get worse or do not improve within 24 hours of starting treatment.  · You are not able to urinate for more than 8 hours.  · Your signs come back after treatment has stopped.  Teach Back: Helping You Understand   The Teach Back Method helps you understand the information we are giving you. After you talk with the staff, tell them in your own words what you learned. This helps to make sure the staff has described each thing clearly. It also helps to explain things that may have been confusing. Before going home, make sure you can do these:  · I can tell you about my condition.  · I can tell you how to prevent this problem from coming back.  · I can tell you what I will do if my signs do not get better after 24 hours of treatment or come back after I have finished treatment.  Where can I learn more?   Centers for Disease Control  https://www.cdc.gov/antibiotic-use/community/for-patients/common-illnesses/uti.html   Health Navigator  https://www.[employer].org.nz/health-a-z/p/pyelonephritis/   National Center Line of Diabetes and Digestive and Kidney  Diseases  https://www.niddk.nih.gov/health-information/urologic-diseases/kidney-infection-pyelonephritis/all-content   Office on Womens Health  https://www.womenshealth.gov/a-z-topics/urinary-tract-infections   Last Reviewed Date   2021-06-18  Consumer Information Use and Disclaimer   This information is not specific medical advice and does not replace information you receive from your health care provider. This is only a brief summary of general information. It does NOT include all information about conditions, illnesses, injuries, tests, procedures, treatments, therapies, discharge instructions or life-style choices that may apply to you. You must talk with your health care provider for complete information about your health and treatment options. This information should not be used to decide whether or not to accept your health care providers advice, instructions or recommendations. Only your health care provider has the knowledge and training to provide advice that is right for you.  Copyright   Copyright © 2022 UpToDate, Inc. and its affiliates and/or licensors. All rights reserved.    [facility] teaching reviewed with Sherin Ortiz . She verbalized understanding.    Education was provided based on the patient's discharge diagnosis using the attached [employer] patient education as a reference.

## 2022-01-25 ENCOUNTER — DOCUMENT SCAN (OUTPATIENT)
Dept: HOME HEALTH SERVICES | Facility: HOSPITAL | Age: 77
End: 2022-01-25
Payer: MEDICARE

## 2022-01-25 ENCOUNTER — TELEPHONE (OUTPATIENT)
Dept: NEPHROLOGY | Facility: CLINIC | Age: 77
End: 2022-01-25
Payer: MEDICARE

## 2022-01-25 LAB
BACTERIA BLD CULT: NORMAL
BACTERIA BLD CULT: NORMAL

## 2022-01-25 NOTE — TELEPHONE ENCOUNTER
Her home health company is [facility], Mary Ann 22 hours ago (10:02 AM)     TS      Good morning, What is the name of your Home health provider?, I would need to fax them over your lab orders.     Sherin Noble MA., Sarah P., HERMES 3 days ago           Hi I was wondering if I can get my in home care provider to do my lab at home for the visit I have with you on 2/14 at 2pm     If not, I would like to go to the diagnostic center to do lab work on 2/4 after my 10am cardiology appt instead of going out again on 2/10 at 8:15am     Id prefer in home care to do it if anything      Thank you,  Sherin Medina

## 2022-01-28 ENCOUNTER — LAB VISIT (OUTPATIENT)
Dept: LAB | Facility: HOSPITAL | Age: 77
End: 2022-01-28
Attending: NURSE PRACTITIONER
Payer: MEDICARE

## 2022-01-28 DIAGNOSIS — N39.0 RECURRENT UTI: ICD-10-CM

## 2022-01-28 DIAGNOSIS — I13.0 HYPERTENSIVE HEART AND RENAL DISEASE WITH CONGESTIVE HEART FAILURE: Primary | ICD-10-CM

## 2022-01-28 DIAGNOSIS — N18.4 CHRONIC KIDNEY DISEASE, STAGE IV (SEVERE): ICD-10-CM

## 2022-01-28 LAB
ALBUMIN SERPL BCP-MCNC: 2.8 G/DL (ref 3.5–5.2)
ANION GAP SERPL CALC-SCNC: 11 MMOL/L (ref 8–16)
BACTERIA #/AREA URNS AUTO: ABNORMAL /HPF
BILIRUB UR QL STRIP: NEGATIVE
BUN SERPL-MCNC: 30 MG/DL (ref 8–23)
CALCIUM SERPL-MCNC: 9.1 MG/DL (ref 8.7–10.5)
CHLORIDE SERPL-SCNC: 104 MMOL/L (ref 95–110)
CLARITY UR REFRACT.AUTO: ABNORMAL
CO2 SERPL-SCNC: 23 MMOL/L (ref 23–29)
COLOR UR AUTO: YELLOW
CREAT SERPL-MCNC: 2.1 MG/DL (ref 0.5–1.4)
CREAT UR-MCNC: 51 MG/DL (ref 15–325)
EST. GFR  (AFRICAN AMERICAN): 25.2 ML/MIN/1.73 M^2
EST. GFR  (NON AFRICAN AMERICAN): 21.9 ML/MIN/1.73 M^2
GLUCOSE SERPL-MCNC: 198 MG/DL (ref 70–110)
GLUCOSE UR QL STRIP: ABNORMAL
HGB UR QL STRIP: ABNORMAL
HYALINE CASTS UR QL AUTO: 0 /LPF
KETONES UR QL STRIP: NEGATIVE
LEUKOCYTE ESTERASE UR QL STRIP: ABNORMAL
MICROSCOPIC COMMENT: ABNORMAL
NITRITE UR QL STRIP: NEGATIVE
PH UR STRIP: 5 [PH] (ref 5–8)
PHOSPHATE SERPL-MCNC: 3 MG/DL (ref 2.7–4.5)
POTASSIUM SERPL-SCNC: 4 MMOL/L (ref 3.5–5.1)
PROT UR QL STRIP: ABNORMAL
PROT UR-MCNC: 49 MG/DL (ref 0–15)
PROT/CREAT UR: 0.96 MG/G{CREAT} (ref 0–0.2)
PTH-INTACT SERPL-MCNC: 373.6 PG/ML (ref 9–77)
RBC #/AREA URNS AUTO: 35 /HPF (ref 0–4)
SODIUM SERPL-SCNC: 138 MMOL/L (ref 136–145)
SP GR UR STRIP: 1.01 (ref 1–1.03)
SQUAMOUS #/AREA URNS AUTO: 5 /HPF
URN SPEC COLLECT METH UR: ABNORMAL
WBC #/AREA URNS AUTO: >100 /HPF (ref 0–5)
WBC CLUMPS UR QL AUTO: ABNORMAL
YEAST UR QL AUTO: ABNORMAL

## 2022-01-28 PROCEDURE — 80069 RENAL FUNCTION PANEL: CPT | Mod: HCNC | Performed by: NURSE PRACTITIONER

## 2022-01-28 PROCEDURE — 36415 COLL VENOUS BLD VENIPUNCTURE: CPT | Mod: HCNC,PO | Performed by: NURSE PRACTITIONER

## 2022-01-28 PROCEDURE — 84156 ASSAY OF PROTEIN URINE: CPT | Mod: HCNC | Performed by: NURSE PRACTITIONER

## 2022-01-28 PROCEDURE — 83970 ASSAY OF PARATHORMONE: CPT | Mod: HCNC | Performed by: NURSE PRACTITIONER

## 2022-01-28 PROCEDURE — 81001 URINALYSIS AUTO W/SCOPE: CPT | Mod: HCNC | Performed by: NURSE PRACTITIONER

## 2022-01-28 NOTE — PHYSICIAN QUERY
PT Name: Sherin Ortiz  MR #: [id number]     DOCUMENTATION CLARIFICATION      CDS/: Alf HARDIN, RN-BC  Contact information: [email or web address]  This form is a permanent document in the medical record.     Query Date: January 28, 2022    By submitting this query, we are merely seeking further clarification of documentation to reflect the severity of illness of your patient. Please utilize your independent clinical judgment when addressing the question(s) below.    The Medical Record contains the following:   Indicators   Supporting Clinical Findings Location in Medical Record   X Documentation/History of condition Pyelonephritis-c/o back pain and decreased appetite lasting approximately 1 week.    History of renal stones with stenting and Ecoli  1/14/22 was prescribed doxycycline for presumed UTI.  She reports persistent pain since then.  UA positive [facility], H&P 1/18   X Vital signs 98.3         72               17             149/70  96.3         75               17             159/73  97.8         75               18             161/71      1/18 VS   X Lab Value(s) WBC 4.89  1/18 Labs   X Culture(s) Blood culture-Peptoniphilus Hare, Diphtheroid    Urine-no growth    Repeat blood culture-no growth  1/18 Labs    1/18 1/19   X Radiology Findings There is urinary bladder wall thickening and ashley vesicular haziness, correlation for UTI/cystitis is needed    There is mild prominence of the middle pole calyx of the left kidney, without additional evidence for hydronephrosis, and without evidence for ureteral calculus or obstructive uropathy otherwise seen, this may relate to sequelae of a recently passed calculus, may relate to variant anatomy, correlation for UTI/pyelonephritis is needed, note is made there does not appear to be significant perinephric inflammatory change CT Abdomen 1/18   X Treatment/Medication Started on Rocephin in ED-continue  consult nephrology    [facility], H&P 1/18     Other       Provider, please further specify the diagnosis of Pyelonephritis.  [   x] Acute   [   ] Chronic   [   ] Acute on chronic   [   ] Ruled Out   [   ] Past history only, not a current diagnosis   [   ] Other clarification (please specify): _____________   [   ] Clinically undetermined     Please document in your progress notes daily for the duration of treatment until resolved, and include in your discharge summary.    Form No. [id number]

## 2022-01-31 ENCOUNTER — PATIENT MESSAGE (OUTPATIENT)
Dept: NEPHROLOGY | Facility: CLINIC | Age: 77
End: 2022-01-31
Payer: MEDICARE

## 2022-01-31 ENCOUNTER — LAB VISIT (OUTPATIENT)
Dept: LAB | Facility: HOSPITAL | Age: 77
End: 2022-01-31
Attending: NURSE PRACTITIONER
Payer: MEDICARE

## 2022-01-31 DIAGNOSIS — I13.0 HYPERTENSIVE HEART AND RENAL DISEASE WITH CONGESTIVE HEART FAILURE: Primary | ICD-10-CM

## 2022-01-31 LAB
BASOPHILS # BLD AUTO: 0.03 K/UL (ref 0–0.2)
BASOPHILS NFR BLD: 0.6 % (ref 0–1.9)
DIFFERENTIAL METHOD: ABNORMAL
EOSINOPHIL # BLD AUTO: 0.1 K/UL (ref 0–0.5)
EOSINOPHIL NFR BLD: 1.9 % (ref 0–8)
ERYTHROCYTE [DISTWIDTH] IN BLOOD BY AUTOMATED COUNT: 14.7 % (ref 11.5–14.5)
HCT VFR BLD AUTO: 36.3 % (ref 37–48.5)
HGB BLD-MCNC: 11.4 G/DL (ref 12–16)
IMM GRANULOCYTES # BLD AUTO: 0.01 K/UL (ref 0–0.04)
IMM GRANULOCYTES NFR BLD AUTO: 0.2 % (ref 0–0.5)
LYMPHOCYTES # BLD AUTO: 1.4 K/UL (ref 1–4.8)
LYMPHOCYTES NFR BLD: 27.8 % (ref 18–48)
MCH RBC QN AUTO: 28.6 PG (ref 27–31)
MCHC RBC AUTO-ENTMCNC: 31.4 G/DL (ref 32–36)
MCV RBC AUTO: 91 FL (ref 82–98)
MONOCYTES # BLD AUTO: 0.4 K/UL (ref 0.3–1)
MONOCYTES NFR BLD: 8.1 % (ref 4–15)
NEUTROPHILS # BLD AUTO: 3.2 K/UL (ref 1.8–7.7)
NEUTROPHILS NFR BLD: 61.4 % (ref 38–73)
NRBC BLD-RTO: 0 /100 WBC
PLATELET # BLD AUTO: 268 K/UL (ref 150–450)
PMV BLD AUTO: 10.5 FL (ref 9.2–12.9)
RBC # BLD AUTO: 3.99 M/UL (ref 4–5.4)
WBC # BLD AUTO: 5.18 K/UL (ref 3.9–12.7)

## 2022-01-31 PROCEDURE — 85025 COMPLETE CBC W/AUTO DIFF WBC: CPT | Mod: HCNC | Performed by: NURSE PRACTITIONER

## 2022-02-01 ENCOUNTER — TELEPHONE (OUTPATIENT)
Dept: NEPHROLOGY | Facility: CLINIC | Age: 77
End: 2022-02-01
Payer: MEDICARE

## 2022-02-01 NOTE — TELEPHONE ENCOUNTER
----- Message from Kecia Stone MA sent at 2/1/2022  9:13 AM CST -----  Hi, Pt. Denies any symptoms of a uti, She also said that she has 3 days left of a 10 day Rx. Of Cefpodoxime 200mg, 1 tab, bid.  ----- Message -----  From: Jody Murray NP  Sent: 2/1/2022   8:55 AM CST  To: Kecia Stone MA    Pls call pt and ask if she has any UTI symptoms. Thanks.

## 2022-02-01 NOTE — TELEPHONE ENCOUNTER
----- Message from Jody Murray NP sent at 2/1/2022  8:55 AM CST -----  Pls call pt and ask if she has any UTI symptoms. Thanks.

## 2022-02-03 ENCOUNTER — PATIENT OUTREACH (OUTPATIENT)
Dept: ADMINISTRATIVE | Facility: OTHER | Age: 77
End: 2022-02-03
Payer: MEDICARE

## 2022-02-03 NOTE — PROGRESS NOTES
Health Maintenance Due   Topic Date Due    COVID-19 Vaccine (1) Never done    DEXA SCAN  Never done    Shingles Vaccine (1 of 2) Never done    Eye Exam  05/29/2020    Foot Exam  05/30/2020     Updates were requested from care everywhere.  Chart was reviewed for overdue Proactive [facility] Encounters (JESSICA) topics (CRS, Breast Cancer Screening, Eye exam)  Health Maintenance has been updated.  LINKS immunization registry triggered.  Immunizations were reconciled.

## 2022-02-04 ENCOUNTER — OFFICE VISIT (OUTPATIENT)
Dept: CARDIOLOGY | Facility: CLINIC | Age: 77
End: 2022-02-04
Payer: MEDICARE

## 2022-02-04 VITALS — DIASTOLIC BLOOD PRESSURE: 80 MMHG | SYSTOLIC BLOOD PRESSURE: 140 MMHG | OXYGEN SATURATION: 99 % | HEART RATE: 64 BPM

## 2022-02-04 DIAGNOSIS — E66.01 MORBID OBESITY WITH BODY MASS INDEX (BMI) GREATER THAN OR EQUAL TO 50: ICD-10-CM

## 2022-02-04 DIAGNOSIS — L97.929 VENOUS ULCER OF BOTH LOWER EXTREMITIES WITH VARICOSE VEINS: ICD-10-CM

## 2022-02-04 DIAGNOSIS — I77.9 PAOD (PERIPHERAL ARTERIAL OCCLUSIVE DISEASE): ICD-10-CM

## 2022-02-04 DIAGNOSIS — E78.5 HYPERLIPIDEMIA LDL GOAL <100: Chronic | ICD-10-CM

## 2022-02-04 DIAGNOSIS — I50.30 HEART FAILURE WITH PRESERVED EJECTION FRACTION, UNSPECIFIED HF CHRONICITY: ICD-10-CM

## 2022-02-04 DIAGNOSIS — I13.0 BENIGN HYPERTENSIVE HEART AND KIDNEY DISEASE WITH HF AND CKD: ICD-10-CM

## 2022-02-04 DIAGNOSIS — I70.0 AORTIC ATHEROSCLEROSIS: ICD-10-CM

## 2022-02-04 DIAGNOSIS — E11.42 TYPE 2 DIABETES MELLITUS WITH DIABETIC POLYNEUROPATHY, WITH LONG-TERM CURRENT USE OF INSULIN: Chronic | ICD-10-CM

## 2022-02-04 DIAGNOSIS — I89.0 LYMPHEDEMA OF BOTH LOWER EXTREMITIES: ICD-10-CM

## 2022-02-04 DIAGNOSIS — I83.029 VENOUS ULCER OF BOTH LOWER EXTREMITIES WITH VARICOSE VEINS: ICD-10-CM

## 2022-02-04 DIAGNOSIS — I83.019 VENOUS ULCER OF BOTH LOWER EXTREMITIES WITH VARICOSE VEINS: ICD-10-CM

## 2022-02-04 DIAGNOSIS — L97.919 VENOUS ULCER OF BOTH LOWER EXTREMITIES WITH VARICOSE VEINS: ICD-10-CM

## 2022-02-04 DIAGNOSIS — Z79.4 TYPE 2 DIABETES MELLITUS WITH DIABETIC POLYNEUROPATHY, WITH LONG-TERM CURRENT USE OF INSULIN: Chronic | ICD-10-CM

## 2022-02-04 DIAGNOSIS — I89.0 LYMPHEDEMA: ICD-10-CM

## 2022-02-04 DIAGNOSIS — I87.2 VENOUS INSUFFICIENCY OF BOTH LOWER EXTREMITIES: Primary | ICD-10-CM

## 2022-02-04 DIAGNOSIS — I10 ESSENTIAL HYPERTENSION: Chronic | ICD-10-CM

## 2022-02-04 DIAGNOSIS — I87.2 VENOUS REFLUX: ICD-10-CM

## 2022-02-04 PROCEDURE — 99999 PR PBB SHADOW E&M-EST. PATIENT-LVL III: ICD-10-PCS | Mod: PBBFAC,HCNC,, | Performed by: INTERNAL MEDICINE

## 2022-02-04 PROCEDURE — 3079F DIAST BP 80-89 MM HG: CPT | Mod: HCNC,CPTII,S$GLB, | Performed by: INTERNAL MEDICINE

## 2022-02-04 PROCEDURE — 3052F HG A1C>EQUAL 8.0%<EQUAL 9.0%: CPT | Mod: HCNC,CPTII,S$GLB, | Performed by: INTERNAL MEDICINE

## 2022-02-04 PROCEDURE — 3079F PR MOST RECENT DIASTOLIC BLOOD PRESSURE 80-89 MM HG: ICD-10-PCS | Mod: HCNC,CPTII,S$GLB, | Performed by: INTERNAL MEDICINE

## 2022-02-04 PROCEDURE — 99204 PR OFFICE/OUTPT VISIT, NEW, LEVL IV, 45-59 MIN: ICD-10-PCS | Mod: HCNC,S$GLB,, | Performed by: INTERNAL MEDICINE

## 2022-02-04 PROCEDURE — 99499 RISK ADDL DX/OHS AUDIT: ICD-10-PCS | Mod: HCNC,S$GLB,, | Performed by: INTERNAL MEDICINE

## 2022-02-04 PROCEDURE — 3077F SYST BP >= 140 MM HG: CPT | Mod: HCNC,CPTII,S$GLB, | Performed by: INTERNAL MEDICINE

## 2022-02-04 PROCEDURE — 3077F PR MOST RECENT SYSTOLIC BLOOD PRESSURE >= 140 MM HG: ICD-10-PCS | Mod: HCNC,CPTII,S$GLB, | Performed by: INTERNAL MEDICINE

## 2022-02-04 PROCEDURE — 99499 UNLISTED E&M SERVICE: CPT | Mod: HCNC,S$GLB,, | Performed by: INTERNAL MEDICINE

## 2022-02-04 PROCEDURE — 1160F RVW MEDS BY RX/DR IN RCRD: CPT | Mod: HCNC,CPTII,S$GLB, | Performed by: INTERNAL MEDICINE

## 2022-02-04 PROCEDURE — 1111F PR DISCHARGE MEDS RECONCILED W/ CURRENT OUTPATIENT MED LIST: ICD-10-PCS | Mod: HCNC,CPTII,S$GLB, | Performed by: INTERNAL MEDICINE

## 2022-02-04 PROCEDURE — 1159F MED LIST DOCD IN RCRD: CPT | Mod: HCNC,CPTII,S$GLB, | Performed by: INTERNAL MEDICINE

## 2022-02-04 PROCEDURE — 1111F DSCHRG MED/CURRENT MED MERGE: CPT | Mod: HCNC,CPTII,S$GLB, | Performed by: INTERNAL MEDICINE

## 2022-02-04 PROCEDURE — 1159F PR MEDICATION LIST DOCUMENTED IN MEDICAL RECORD: ICD-10-PCS | Mod: HCNC,CPTII,S$GLB, | Performed by: INTERNAL MEDICINE

## 2022-02-04 PROCEDURE — 99204 OFFICE O/P NEW MOD 45 MIN: CPT | Mod: HCNC,S$GLB,, | Performed by: INTERNAL MEDICINE

## 2022-02-04 PROCEDURE — 99999 PR PBB SHADOW E&M-EST. PATIENT-LVL III: CPT | Mod: PBBFAC,HCNC,, | Performed by: INTERNAL MEDICINE

## 2022-02-04 PROCEDURE — 3052F PR MOST RECENT HEMOGLOBIN A1C LEVEL 8.0 - < 9.0%: ICD-10-PCS | Mod: HCNC,CPTII,S$GLB, | Performed by: INTERNAL MEDICINE

## 2022-02-04 PROCEDURE — 1160F PR REVIEW ALL MEDS BY PRESCRIBER/CLIN PHARMACIST DOCUMENTED: ICD-10-PCS | Mod: HCNC,CPTII,S$GLB, | Performed by: INTERNAL MEDICINE

## 2022-02-07 ENCOUNTER — DOCUMENT SCAN (OUTPATIENT)
Dept: HOME HEALTH SERVICES | Facility: HOSPITAL | Age: 77
End: 2022-02-07

## 2022-02-07 ENCOUNTER — DOCUMENT SCAN (OUTPATIENT)
Dept: HOME HEALTH SERVICES | Facility: HOSPITAL | Age: 77
End: 2022-02-07
Payer: MEDICARE

## 2022-02-07 ENCOUNTER — OFFICE VISIT (OUTPATIENT)
Dept: HOME HEALTH SERVICES | Facility: CLINIC | Age: 77
End: 2022-02-07
Payer: MEDICARE

## 2022-02-07 DIAGNOSIS — I89.0 LYMPHEDEMA OF BOTH LOWER EXTREMITIES: Primary | ICD-10-CM

## 2022-02-07 PROCEDURE — 99497 ADVNCD CARE PLAN 30 MIN: CPT | Mod: S$GLB,,, | Performed by: NURSE PRACTITIONER

## 2022-02-07 PROCEDURE — 99350 HOME/RES VST EST HIGH MDM 60: CPT | Mod: S$GLB,,, | Performed by: NURSE PRACTITIONER

## 2022-02-07 PROCEDURE — 1126F AMNT PAIN NOTED NONE PRSNT: CPT | Mod: CPTII,S$GLB,, | Performed by: NURSE PRACTITIONER

## 2022-02-07 PROCEDURE — 99499 RISK ADDL DX/OHS AUDIT: ICD-10-PCS | Mod: S$GLB,,, | Performed by: NURSE PRACTITIONER

## 2022-02-07 PROCEDURE — 99497 PR ADVNCD CARE PLAN 30 MIN: ICD-10-PCS | Mod: S$GLB,,, | Performed by: NURSE PRACTITIONER

## 2022-02-07 PROCEDURE — 1126F PR PAIN SEVERITY QUANTIFIED, NO PAIN PRESENT: ICD-10-PCS | Mod: CPTII,S$GLB,, | Performed by: NURSE PRACTITIONER

## 2022-02-07 PROCEDURE — 3075F PR MOST RECENT SYSTOLIC BLOOD PRESS GE 130-139MM HG: ICD-10-PCS | Mod: CPTII,S$GLB,, | Performed by: NURSE PRACTITIONER

## 2022-02-07 PROCEDURE — 3075F SYST BP GE 130 - 139MM HG: CPT | Mod: CPTII,S$GLB,, | Performed by: NURSE PRACTITIONER

## 2022-02-07 PROCEDURE — 99350 PR HOME VISIT,ESTAB PATIENT,LEVEL IV: ICD-10-PCS | Mod: S$GLB,,, | Performed by: NURSE PRACTITIONER

## 2022-02-07 PROCEDURE — 3078F DIAST BP <80 MM HG: CPT | Mod: CPTII,S$GLB,, | Performed by: NURSE PRACTITIONER

## 2022-02-07 PROCEDURE — 99499 UNLISTED E&M SERVICE: CPT | Mod: S$GLB,,, | Performed by: NURSE PRACTITIONER

## 2022-02-07 PROCEDURE — 3078F PR MOST RECENT DIASTOLIC BLOOD PRESSURE < 80 MM HG: ICD-10-PCS | Mod: CPTII,S$GLB,, | Performed by: NURSE PRACTITIONER

## 2022-02-08 ENCOUNTER — LAB VISIT (OUTPATIENT)
Dept: LAB | Facility: HOSPITAL | Age: 77
End: 2022-02-08
Payer: MEDICARE

## 2022-02-08 ENCOUNTER — TELEPHONE (OUTPATIENT)
Dept: NEPHROLOGY | Facility: CLINIC | Age: 77
End: 2022-02-08

## 2022-02-08 ENCOUNTER — OFFICE VISIT (OUTPATIENT)
Dept: NEPHROLOGY | Facility: CLINIC | Age: 77
End: 2022-02-08
Payer: MEDICARE

## 2022-02-08 VITALS
WEIGHT: 263.25 LBS | SYSTOLIC BLOOD PRESSURE: 140 MMHG | OXYGEN SATURATION: 99 % | BODY MASS INDEX: 41.32 KG/M2 | HEART RATE: 83 BPM | HEIGHT: 67 IN | DIASTOLIC BLOOD PRESSURE: 60 MMHG

## 2022-02-08 DIAGNOSIS — N39.0 FREQUENT UTI: ICD-10-CM

## 2022-02-08 DIAGNOSIS — N12 PYELONEPHRITIS: ICD-10-CM

## 2022-02-08 DIAGNOSIS — N18.32 STAGE 3B CHRONIC KIDNEY DISEASE: Primary | ICD-10-CM

## 2022-02-08 DIAGNOSIS — E66.01 CLASS 3 SEVERE OBESITY WITH BODY MASS INDEX (BMI) OF 40.0 TO 44.9 IN ADULT, UNSPECIFIED OBESITY TYPE, UNSPECIFIED WHETHER SERIOUS COMORBIDITY PRESENT: ICD-10-CM

## 2022-02-08 DIAGNOSIS — N17.9 ACUTE RENAL FAILURE SUPERIMPOSED ON STAGE 4 CHRONIC KIDNEY DISEASE, UNSPECIFIED ACUTE RENAL FAILURE TYPE: ICD-10-CM

## 2022-02-08 DIAGNOSIS — N18.4 ACUTE RENAL FAILURE SUPERIMPOSED ON STAGE 4 CHRONIC KIDNEY DISEASE, UNSPECIFIED ACUTE RENAL FAILURE TYPE: ICD-10-CM

## 2022-02-08 DIAGNOSIS — E11.649 TYPE 2 DIABETES MELLITUS WITH HYPOGLYCEMIA WITHOUT COMA, WITH LONG-TERM CURRENT USE OF INSULIN: ICD-10-CM

## 2022-02-08 DIAGNOSIS — I10 HYPERTENSION, UNSPECIFIED TYPE: ICD-10-CM

## 2022-02-08 DIAGNOSIS — N39.0 RECURRENT UTI: ICD-10-CM

## 2022-02-08 DIAGNOSIS — Z79.4 TYPE 2 DIABETES MELLITUS WITH HYPOGLYCEMIA WITHOUT COMA, WITH LONG-TERM CURRENT USE OF INSULIN: ICD-10-CM

## 2022-02-08 LAB
ALBUMIN SERPL BCP-MCNC: 2.9 G/DL (ref 3.5–5.2)
ANION GAP SERPL CALC-SCNC: 10 MMOL/L (ref 8–16)
BASOPHILS # BLD AUTO: 0.02 K/UL (ref 0–0.2)
BASOPHILS NFR BLD: 0.6 % (ref 0–1.9)
BUN SERPL-MCNC: 22 MG/DL (ref 8–23)
CALCIUM SERPL-MCNC: 9.6 MG/DL (ref 8.7–10.5)
CHLORIDE SERPL-SCNC: 103 MMOL/L (ref 95–110)
CO2 SERPL-SCNC: 25 MMOL/L (ref 23–29)
CREAT SERPL-MCNC: 1.9 MG/DL (ref 0.5–1.4)
DIFFERENTIAL METHOD: ABNORMAL
EOSINOPHIL # BLD AUTO: 0.1 K/UL (ref 0–0.5)
EOSINOPHIL NFR BLD: 2 % (ref 0–8)
ERYTHROCYTE [DISTWIDTH] IN BLOOD BY AUTOMATED COUNT: 14.8 % (ref 11.5–14.5)
EST. GFR  (AFRICAN AMERICAN): 28.5 ML/MIN/1.73 M^2
EST. GFR  (NON AFRICAN AMERICAN): 24.7 ML/MIN/1.73 M^2
GLUCOSE SERPL-MCNC: 231 MG/DL (ref 70–110)
HCT VFR BLD AUTO: 37.9 % (ref 37–48.5)
HGB BLD-MCNC: 11.7 G/DL (ref 12–16)
IMM GRANULOCYTES # BLD AUTO: 0.01 K/UL (ref 0–0.04)
IMM GRANULOCYTES NFR BLD AUTO: 0.3 % (ref 0–0.5)
LYMPHOCYTES # BLD AUTO: 1.1 K/UL (ref 1–4.8)
LYMPHOCYTES NFR BLD: 29.9 % (ref 18–48)
MCH RBC QN AUTO: 28.3 PG (ref 27–31)
MCHC RBC AUTO-ENTMCNC: 30.9 G/DL (ref 32–36)
MCV RBC AUTO: 92 FL (ref 82–98)
MONOCYTES # BLD AUTO: 0.3 K/UL (ref 0.3–1)
MONOCYTES NFR BLD: 7.8 % (ref 4–15)
NEUTROPHILS # BLD AUTO: 2.1 K/UL (ref 1.8–7.7)
NEUTROPHILS NFR BLD: 59.4 % (ref 38–73)
NRBC BLD-RTO: 0 /100 WBC
PHOSPHATE SERPL-MCNC: 3.4 MG/DL (ref 2.7–4.5)
PLATELET # BLD AUTO: 208 K/UL (ref 150–450)
PMV BLD AUTO: 9.9 FL (ref 9.2–12.9)
POTASSIUM SERPL-SCNC: 4.5 MMOL/L (ref 3.5–5.1)
RBC # BLD AUTO: 4.14 M/UL (ref 4–5.4)
SODIUM SERPL-SCNC: 138 MMOL/L (ref 136–145)
WBC # BLD AUTO: 3.58 K/UL (ref 3.9–12.7)

## 2022-02-08 PROCEDURE — 99999 PR PBB SHADOW E&M-EST. PATIENT-LVL V: ICD-10-PCS | Mod: PBBFAC,HCNC,, | Performed by: NURSE PRACTITIONER

## 2022-02-08 PROCEDURE — 3052F HG A1C>EQUAL 8.0%<EQUAL 9.0%: CPT | Mod: HCNC,CPTII,S$GLB, | Performed by: NURSE PRACTITIONER

## 2022-02-08 PROCEDURE — 1125F AMNT PAIN NOTED PAIN PRSNT: CPT | Mod: HCNC,CPTII,S$GLB, | Performed by: NURSE PRACTITIONER

## 2022-02-08 PROCEDURE — 1160F RVW MEDS BY RX/DR IN RCRD: CPT | Mod: HCNC,CPTII,S$GLB, | Performed by: NURSE PRACTITIONER

## 2022-02-08 PROCEDURE — 99214 PR OFFICE/OUTPT VISIT, EST, LEVL IV, 30-39 MIN: ICD-10-PCS | Mod: HCNC,S$GLB,, | Performed by: NURSE PRACTITIONER

## 2022-02-08 PROCEDURE — 3052F PR MOST RECENT HEMOGLOBIN A1C LEVEL 8.0 - < 9.0%: ICD-10-PCS | Mod: HCNC,CPTII,S$GLB, | Performed by: NURSE PRACTITIONER

## 2022-02-08 PROCEDURE — 3078F PR MOST RECENT DIASTOLIC BLOOD PRESSURE < 80 MM HG: ICD-10-PCS | Mod: HCNC,CPTII,S$GLB, | Performed by: NURSE PRACTITIONER

## 2022-02-08 PROCEDURE — 99499 UNLISTED E&M SERVICE: CPT | Mod: S$GLB,,, | Performed by: NURSE PRACTITIONER

## 2022-02-08 PROCEDURE — 80069 RENAL FUNCTION PANEL: CPT | Mod: HCNC | Performed by: NURSE PRACTITIONER

## 2022-02-08 PROCEDURE — 1160F PR REVIEW ALL MEDS BY PRESCRIBER/CLIN PHARMACIST DOCUMENTED: ICD-10-PCS | Mod: HCNC,CPTII,S$GLB, | Performed by: NURSE PRACTITIONER

## 2022-02-08 PROCEDURE — 3077F PR MOST RECENT SYSTOLIC BLOOD PRESSURE >= 140 MM HG: ICD-10-PCS | Mod: HCNC,CPTII,S$GLB, | Performed by: NURSE PRACTITIONER

## 2022-02-08 PROCEDURE — 1101F PR PT FALLS ASSESS DOC 0-1 FALLS W/OUT INJ PAST YR: ICD-10-PCS | Mod: HCNC,CPTII,S$GLB, | Performed by: NURSE PRACTITIONER

## 2022-02-08 PROCEDURE — 99499 RISK ADDL DX/OHS AUDIT: ICD-10-PCS | Mod: S$GLB,,, | Performed by: NURSE PRACTITIONER

## 2022-02-08 PROCEDURE — 1111F DSCHRG MED/CURRENT MED MERGE: CPT | Mod: HCNC,CPTII,S$GLB, | Performed by: NURSE PRACTITIONER

## 2022-02-08 PROCEDURE — 99214 OFFICE O/P EST MOD 30 MIN: CPT | Mod: HCNC,S$GLB,, | Performed by: NURSE PRACTITIONER

## 2022-02-08 PROCEDURE — 1125F PR PAIN SEVERITY QUANTIFIED, PAIN PRESENT: ICD-10-PCS | Mod: HCNC,CPTII,S$GLB, | Performed by: NURSE PRACTITIONER

## 2022-02-08 PROCEDURE — 1159F PR MEDICATION LIST DOCUMENTED IN MEDICAL RECORD: ICD-10-PCS | Mod: HCNC,CPTII,S$GLB, | Performed by: NURSE PRACTITIONER

## 2022-02-08 PROCEDURE — 3078F DIAST BP <80 MM HG: CPT | Mod: HCNC,CPTII,S$GLB, | Performed by: NURSE PRACTITIONER

## 2022-02-08 PROCEDURE — 1101F PT FALLS ASSESS-DOCD LE1/YR: CPT | Mod: HCNC,CPTII,S$GLB, | Performed by: NURSE PRACTITIONER

## 2022-02-08 PROCEDURE — 1159F MED LIST DOCD IN RCRD: CPT | Mod: HCNC,CPTII,S$GLB, | Performed by: NURSE PRACTITIONER

## 2022-02-08 PROCEDURE — 99999 PR PBB SHADOW E&M-EST. PATIENT-LVL V: CPT | Mod: PBBFAC,HCNC,, | Performed by: NURSE PRACTITIONER

## 2022-02-08 PROCEDURE — 3288F FALL RISK ASSESSMENT DOCD: CPT | Mod: HCNC,CPTII,S$GLB, | Performed by: NURSE PRACTITIONER

## 2022-02-08 PROCEDURE — 3077F SYST BP >= 140 MM HG: CPT | Mod: HCNC,CPTII,S$GLB, | Performed by: NURSE PRACTITIONER

## 2022-02-08 PROCEDURE — 3288F PR FALLS RISK ASSESSMENT DOCUMENTED: ICD-10-PCS | Mod: HCNC,CPTII,S$GLB, | Performed by: NURSE PRACTITIONER

## 2022-02-08 PROCEDURE — 36415 COLL VENOUS BLD VENIPUNCTURE: CPT | Mod: HCNC | Performed by: NURSE PRACTITIONER

## 2022-02-08 PROCEDURE — 85025 COMPLETE CBC W/AUTO DIFF WBC: CPT | Mod: HCNC | Performed by: NURSE PRACTITIONER

## 2022-02-08 PROCEDURE — 1111F PR DISCHARGE MEDS RECONCILED W/ CURRENT OUTPATIENT MED LIST: ICD-10-PCS | Mod: HCNC,CPTII,S$GLB, | Performed by: NURSE PRACTITIONER

## 2022-02-08 NOTE — PROGRESS NOTES
Subjective:       Patient ID: Sherin Ortiz is a 77 y.o. white female who presents for follow-up evaluation of   No chief complaint on file.      HPI     Last seen by me in April 2021. Last seen by Dr. Choi in January 2018.      Patient presents for f/u of CKD.  Baseline creatinine very labile. Appears to be around 1.4 -1.6 mg/dL. No new labs, though she had an LOREN during an admission in May. Denies NSAIDs.    Hospitalized May 2021 c pyelo for IV antibiotics and hypertensive emergency.  Had heel fracture at some point since Jan. 2021.    Significant hx includes  uncontrolled DM x 50 yrs with diabetic neuropathy and proliferative diabetic retinopathy (laser surgery), morbid obesity, HTN, diastolic dysfunction (?). She also has a hx of kidney stones, she had an episode in January 2017 with right sided hydronephrosis and intervention (passed when stent was placed).     Significant family hx includes: no known renal disease, though son-in-law has ESRD    Last renal US: March 2019, reviewed. No hydronephrosis.    Update 11/15/21:  Last seen by me in July 2021.  Presents for f/u of CKD.  Baseline sCr 1.4-1.6 mg/dL.  Denies NSAID use, recent hospitalizations.   Home BPs: says its SBP in 120s per home health    Update 2/8/22:  Presents for f/u of CKD.  Baseline sCr 1.4-1.6 mg/dL  Admitted in January with pyelonephritis and prescribed ceftriaxone and discharged on cefpodoxime. Urine culture + for klebsiella, enterobacter and proteus. Had LOREN c sCr of 2.2 --> 1.9 (after 500 cc LR bolus) --> 1.7 --> 2.1. Said she did not have UTI symptoms prior to the pyelonephritis.    Home BPs: does not remember; says it is the same as last time she sent BP records from home health    Denies NSAIDs.    Review of Systems   Respiratory: Negative for shortness of breath.    Cardiovascular: Positive for leg swelling.   Gastrointestinal: Positive for diarrhea (has resolved since she has been off antibiotics). Negative for nausea and  "vomiting.   Genitourinary: Negative for difficulty urinating, dysuria, frequency (at baseline), hematuria and urgency (at baseline).        "usually get infections all the time, but I never know I have them"   Neurological: Positive for weakness.   Psychiatric/Behavioral: Positive for sleep disturbance (did not sleep well last night).       Objective:       Blood pressure (!) 140/60, pulse 83, height 5' 7" (1.702 m), weight 119.4 kg (263 lb 3.7 oz), SpO2 99 %.  Physical Exam  Constitutional:       General: She is not in acute distress.     Appearance: Normal appearance. She is well-developed. She is obese. She is not ill-appearing, toxic-appearing or diaphoretic.   Cardiovascular:      Rate and Rhythm: Normal rate.   Pulmonary:      Effort: Pulmonary effort is normal. No respiratory distress.   Abdominal:      Tenderness: There is no right CVA tenderness or left CVA tenderness.   Musculoskeletal:      Cervical back: Neck supple.      Right lower leg: Right lower leg edema: CARMELO legs wrapped with dressings.      Left lower leg: Left lower leg edema:  CARMELO legs wrapped with dressings.   Neurological:      Mental Status: She is alert and oriented to person, place, and time.   Psychiatric:         Mood and Affect: Mood normal.         Behavior: Behavior normal.         Thought Content: Thought content normal.         Judgment: Judgment normal.           Lab Results   Component Value Date    CREATININE 2.1 (H) 01/28/2022    URICACID 13.9 (H) 12/03/2020     Prot/Creat Ratio, Urine   Date Value Ref Range Status   01/28/2022 0.96 (H) 0.00 - 0.20 Final   01/18/2022 3.65 (H) 0.00 - 0.20 Final   11/22/2021 3.05 (H) 0.00 - 0.20 Final     Lab Results   Component Value Date     01/28/2022    K 4.0 01/28/2022    CO2 23 01/28/2022     01/28/2022     Lab Results   Component Value Date    .6 (H) 01/28/2022    CALCIUM 9.1 01/28/2022    CAION 1.22 12/15/2012    PHOS 3.0 01/28/2022     Lab Results   Component Value Date "    HGB 11.4 (L) 01/31/2022    WBC 5.18 01/31/2022    HCT 36.3 (L) 01/31/2022      Lab Results   Component Value Date    HGBA1C 8.4 (H) 01/18/2022     01/31/2022    BUN 30 (H) 01/28/2022     Lab Results   Component Value Date    LDLCALC 70.2 03/30/2021         Assessment:       1. Stage 3b chronic kidney disease    2. Acute renal failure superimposed on stage 4 chronic kidney disease, unspecified acute renal failure type    3. Pyelonephritis    4. Recurrent UTI    5. Type 2 diabetes mellitus with hypoglycemia without coma, with long-term current use of insulin    6. Hypertension, unspecified type    7. Class 3 severe obesity with body mass index (BMI) of 40.0 to 44.9 in adult, unspecified obesity type, unspecified whether serious comorbidity present    8. Frequent UTI        Plan:   CKD stage 3B c eGFR 30 mL/min  - Most likely secondary to diabetic nephropathy (also has retinopathy and neuropathy). However, she also has had evidence of hydronephrosis in the past in an ultrasound with resolution in an follow up CT in 12/17.  Fairly stable. Needs repeat urine. Educated patient to control BP, BG, weight loss, remain well-hydrated, and avoid NSAIDs to prevent progression of CKD.    UPCR UPCR has proteinuria, but it's improved. History of hyperkalemia.   Acid-base WNL.   Renal osteodystrophy Ca, phos okay. PTH elevated. Just began ergo 50k weekly.   Anemia Hgb at goal.   DM Poorly controlled. No recent A1c.   Lipid Management On statin.   ESRD planning Anticipatory guidance provided about timing of dialysis. Start discussions and planning when eGFR is about 20 mL/min; most patients start dialysis between 5-10 mL/min.       HTN - High today but sounds WNL at home on nifedipine 60 mg  - Reports white coat syndrome    Hyperkalemia - WNL. Continue low k diet    Frequent UTIs/ pyelonephritis - UA, culture; refer to urology    All questions patient had were answered.  Asked if further questions. None. F/u in clinic in 3  mos with labs and urine prior to next visit or sooner if needed.  ER for emergency concerns.    Summary of Plan:  1.  UA, UPCR, urine culture, CBC, RFP  2. avoid NSAID/ bactrim/ IV contrast/ gadolinium/ aminoglycoside where possible  3. RTC in 3 mos

## 2022-02-09 ENCOUNTER — DOCUMENT SCAN (OUTPATIENT)
Dept: HOME HEALTH SERVICES | Facility: HOSPITAL | Age: 77
End: 2022-02-09
Payer: MEDICARE

## 2022-02-11 ENCOUNTER — PATIENT MESSAGE (OUTPATIENT)
Dept: NEPHROLOGY | Facility: CLINIC | Age: 77
End: 2022-02-11
Payer: MEDICARE

## 2022-02-11 DIAGNOSIS — N39.0 RECURRENT UTI: Primary | ICD-10-CM

## 2022-02-11 RX ORDER — CEFPODOXIME PROXETIL 200 MG/1
200 TABLET, FILM COATED ORAL DAILY
Qty: 7 TABLET | Refills: 0 | Status: SHIPPED | OUTPATIENT
Start: 2022-02-11 | End: 2022-02-18

## 2022-02-14 VITALS
OXYGEN SATURATION: 98 % | HEART RATE: 75 BPM | WEIGHT: 263 LBS | HEIGHT: 67 IN | BODY MASS INDEX: 41.28 KG/M2 | SYSTOLIC BLOOD PRESSURE: 136 MMHG | DIASTOLIC BLOOD PRESSURE: 77 MMHG | RESPIRATION RATE: 18 BRPM | TEMPERATURE: 98 F

## 2022-02-14 NOTE — PROGRESS NOTES
"  [facility]  Transition of Care Home Visit    Visit Date: 2/7/2022  Encounter Provider: Sahra Hancock NP  PCP:  Deedee Calderon MD    PRESENTING HISTORY      Patient ID: Sherin Ortiz is a 77 y.o. female.    Consult Requested By:  No ref. provider found  Reason for Consult:  Hospital follow up    Sherin is being seen at home due to  physical debility that presents a taxing effort to leave the home, to mitigate high risk of hospital readmission or due to the limited availability of reliable or safe options for transportation to the point of access to the provider. The visit meets the criteria for medical necessity as defined by CMS as "health-care services needed to prevent, diagnose, or treat an illness, injury, condition, disease, or its symptoms and that meet accepted standards of medicine."  Prior to treatment on this visit the chart was reviewed and patient consent was obtained.        Chief Complaint: Transitional Care      History of Present Illness: Ms. Sherin Ortiz is a 77 y.o. female who was recently admitted to      [facility]        .    Admission Date: 1/18/2022  Hospital Length of Stay: 2 days  Discharge Date and Time:  01/21/2022     HPI:   Sherin Ortiz is a 77 y.o. 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), Benign essential hypertension (11/14/2012), Cataract, Chronic kidney disease (CKD), stage III (moderate) (9/12/2013), Diabetic peripheral neuropathy associated with type 2 diabetes mellitus (11/14/2014), Gait disorder, Hyperlipidemia, Iritis - Both Eyes (6/10/2013), Kidney stone, Lymphedema, Morbid obesity with BMI of 40.0-44.9, adult (2/18/2015), Nephrolithiasis (4/20/2016), NS (nuclear sclerosis) (4/1/2013), Nuclear sclerosis - Both Eyes (4/29/2013), Preseptal cellulitis - Right Eye (4/29/2013), Proliferative diabetic retinopathy - Both Eyes (4/29/2013), Proliferative diabetic retinopathy, both eyes (4/1/2013), PSC " (posterior subcapsular cataract) - Both Eyes (4/29/2013), S/P hernia repair (12/19/2012), TIA (transient ischemic attack) (11/18/2014), Tinea pedis (7/24/2012), Type 2 diabetes mellitus with diabetic polyneuropathy, with long-term current use of insulin (9/18/2015), Type 2 diabetes mellitus with renal manifestations, controlled (12/12/2013), Type 2 diabetes, controlled, with moderate nonproliferative diabetic retinopathy without macular edema (9/17/2015), Ulcer of left lower extremity, limited to breakdown of skin (7/8/2015), Unspecified cerebral artery occlusion with cerebral infarction (11/16/2014), Unspecified venous (peripheral) insufficiency, Ureteral stone with hydronephrosis (1/27/2016), UTI (lower urinary tract infection), Vaginal infection, and 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     The patient presented to the ED due to low back pain, dysuria, abdominal pain, and decreased appetite. When re interviewed she states she never has symptoms such as dysuria or frequency when she has UTIs so that is why she never knows when she has an infection. Patient's other symptoms have been going on approximately 1 week.  On January 14, 2022 patient was prescribed doxycycline for presumed UTI.  She reports persistent pain since then.  She has not tried taking any pain medication. Denies history of trauma incontinence numbness or weakness. She also reports an infection to her lower extremities and will underwent wound care food dressed her wounds today. In ED labs remarkable for UTI on UA and LOREN on CKD. There is urinary bladder wall thickening and perivesicular haziness, correlation for UTI/cystitis is needed. CT abd/pelvis:There is mild prominence of the middle pole calyx of the left kidney, without additional evidence for hydronephrosis, and without evidence for ureteral calculus or obstructive uropathy otherwise seen, this may relate to sequelae of a recently passed calculus, may relate to variant anatomy,  correlation for UTI/pyelonephritis is needed, note is made there does not appear to be significant perinephric inflammatory change. Cystic lesion of the right adnexa again noted, stable appearance, clinical and historical correlation is needed. Bilateral adrenal nodules, likely represent adrenal adenomas. Started on Rocephin. Admitted to [facility] for further care.      Hospital Course:   Ms. Ortiz was admitted to [facility] for management of pyelo.  She was started on Ceftriaxone.  Urine culture returned with Klebsiella, Enterobacter and Proteus.  Blood cx returned positive, but are likely to be a contaminant.  She was discharged on Cefpodoxime to complete a 14 day course.  PT/OT say HH.  ___________________________________________________________  Today:Ms. Ortiz is being evaluated at home for a transition of care visit s/p hospitalization, see above.     Patient is AAOx3, able to verify her name and . Most of patients other history was received from her medical record. She sees Dr. Calderon as her PCP. She endorses eating x 3 small meals per day. SHe endorses regular BMs.      VSS. Denies fever, chest pain, shortness of breath, nausea, vomiting, diarrhea. Risks of environmental exposure to coronavirus discussed including: social distancing, hand hygiene, and limiting departures from the home for necessities only.  Reports understanding and willingness to comply.  All hospital discharge orders reviewed and being followed, all medications reconciled and reviewed, patient and family verbalized understanding. No other needs identified at this time.     I initiated the process of advance care planning today and explained the importance of this process to the patient and family.  I introduced the concept of advance directives to the patient, as well. Then the patient received detailed information about the importance of designating a Health Care Power of  (HCPOA). She was also  instructed to communicate with this person about his wishes for future healthcare, should he become sick and lose decision-making capacity. FULL CODE STATUS    I Introduced [place] form with patient/family, explaining this is the patient's wishes, and this form will be uploaded into the patient's [facility] and the [place] Registry.     We spoke about ACP for 20 minutes.    Attestation: Screening criteria to assess the level of the patient's risk for infection with COVID-19 as recommended by the CDC at the time of the above documented home visit concluded appropriateness to proceed. Universal precautions were maintained at all times, including provider use of 60% alcohol gel hand  immediately prior to entry and upon departing the patient's home.    Review of Systems   Constitutional: Positive for appetite change.   HENT: Negative.    Respiratory: Negative.    Cardiovascular: Negative.    Gastrointestinal:  Negative for abdominal distention, diarrhea, nausea and vomiting.   Endocrine: Negative.    Genitourinary:  Negative for dysuria.   Musculoskeletal: Positive for back pain.   Neurological: Negative.      Assessments:  · Environmental: lives in single story home  · Functional Status: Min asst with ADLs  · Safety:Fall precautions  · Nutritional: Renal Diet  · Home Health/DME/Supplies: n/a    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    Benign essential hypertension 11/14/2012    Cataract     s/p phacoemulsification    Chronic kidney disease (CKD), stage III (moderate) 9/12/2013    Diabetic peripheral neuropathy associated with type 2 diabetes mellitus 11/14/2014    causing right hemiparesis    Gait disorder     Hyperlipidemia     Iritis - Both Eyes 6/10/2013    Kidney stone     Lymphedema     Morbid obesity with BMI of 40.0-44.9, adult 2/18/2015    Nephrolithiasis 4/20/2016    NS (nuclear sclerosis) 4/1/2013    Nuclear  sclerosis - Both Eyes 4/29/2013    Preseptal cellulitis - Right Eye 4/29/2013    Proliferative diabetic retinopathy - Both Eyes 4/29/2013    Proliferative diabetic retinopathy, both eyes 4/1/2013    PSC (posterior subcapsular cataract) - Both Eyes 4/29/2013    S/P hernia repair 12/19/2012    TIA (transient ischemic attack) 11/18/2014    Tinea pedis 7/24/2012    Tinea pedis is present on both feet.     Type 2 diabetes mellitus with diabetic polyneuropathy, with long-term current use of insulin 9/18/2015    Type 2 diabetes mellitus with renal manifestations, controlled 12/12/2013    Type 2 diabetes, controlled, with moderate nonproliferative diabetic retinopathy without macular edema 9/17/2015    Ulcer of left lower extremity, limited to breakdown of skin 7/8/2015    Unspecified cerebral artery occlusion with cerebral infarction 11/16/2014    Unspecified venous (peripheral) insufficiency     Ureteral stone with hydronephrosis 1/27/2016    UTI (lower urinary tract infection)     Vaginal infection     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       Past Surgical History:   Procedure Laterality Date    APPENDECTOMY      CATARACT EXTRACTION W/  INTRAOCULAR LENS IMPLANT Left 5/21/2013    CATARACT EXTRACTION W/  INTRAOCULAR LENS IMPLANT Right 6/4/2013    CHOLECYSTECTOMY      COLONOSCOPY  12/22/2005    normal    ESOPHAGOGASTRODUODENOSCOPY  12/21/2015    hiatal hernia, Schatzki ring    EYE SURGERY Bilateral 2008    laser surgery both eyes    NASAL SEPTUM SURGERY      SUBTOTAL COLECTOMY  12/13/2012    transverse colon, for incarcerated umbilical hernia, Dr. Kat Bower       Family History   Problem Relation Age of Onset    Diabetes Sister     Cataracts Sister     Heart disease Brother     Cataracts Brother     Leukemia Mother     Cancer Neg Hx     Amblyopia Neg Hx     Blindness Neg Hx     Glaucoma Neg Hx     Hypertension Neg Hx     Macular degeneration Neg Hx     Retinal  "detachment Neg Hx     Strabismus Neg Hx     Stroke Neg Hx     Thyroid disease Neg Hx     Kidney disease Neg Hx        Social History     Socioeconomic History    Marital status:    Tobacco Use    Smoking status: Former Smoker     Packs/day: 0.50     Years: 15.00     Pack years: 7.50     Types: Cigarettes     Quit date: 1982     Years since quittin.6    Smokeless tobacco: Former User    Tobacco comment: smoked one pack per week   Substance and Sexual Activity    Alcohol use: No    Drug use: No    Sexual activity: Not Currently   Social History Narrative    Lives alone. Ambulatory via motorized wheelchair. Catches bus to come to [facility].      Social Determinants of Health     Financial Resource Strain: Low Risk     Difficulty of Paying Living Expenses: Not very hard   Food Insecurity: No Food Insecurity    Worried About Running Out of Food in the Last Year: Never true    Ran Out of Food in the Last Year: Never true   Transportation Needs: No Transportation Needs    Lack of Transportation (Medical): No    Lack of Transportation (Non-Medical): No   Housing Stability: Low Risk     Unable to Pay for Housing in the Last Year: No    Number of Places Lived in the Last Year: 1    Unstable Housing in the Last Year: No       MEDICATIONS & ALLERGIES:     Current Outpatient Medications on File Prior to Visit   Medication Sig Dispense Refill    acetaminophen (TYLENOL) 325 MG tablet Take 2 tablets (650 mg total) by mouth every 6 (six) hours as needed for Pain.  0    alcohol swabs (BD ALCOHOL SWABS) PadM Apply 1 each topically 2 (two) times daily. 180 each 2    aspirin 81 MG Chew Take 81 mg by mouth once daily.      atorvastatin (LIPITOR) 40 MG tablet TAKE 1 TABLET EVERY EVENING (Patient taking differently: Take 40 mg by mouth every evening.) 90 tablet 3    BD INSULIN SYRINGE ULTRA-FINE 0.5 mL 30 gauge x 1/2" Syrg USE TO INJECT EVERY NIGHT 90 each 3    blood glucose control, high (TRUE METRIX " "LEVEL 3) Soln Used to calibrate weekly 1 each 3    blood sugar diagnostic (TRUE METRIX GLUCOSE TEST STRIP) Strp Test twice daily 200 strip 3    clopidogreL (PLAVIX) 75 mg tablet Take 1 tablet (75 mg total) by mouth once daily. 90 tablet 3    ergocalciferol (ERGOCALCIFEROL) 50,000 unit Cap Take 1 capsule (50,000 Units total) by mouth every 7 days. Weekly - 12 weeks, then monthly 30 capsule 1    gentamicin (GARAMYCIN) 0.3 % ophthalmic solution Place 1 drop into both eyes 3 (three) times daily. 15 mL 0    insulin glargine (LANTUS U-100 INSULIN) 100 unit/mL injection Inject 10-15 Units into the skin every evening. DEPENDING ON SCALE (DISCARD EACH VIAL AFTER 28 DAYS)      insulin syr/ndl U100 half yesenia (DROPLET INSULIN SYR HALF UNIT) 0.5 mL 30 gauge x 1/2" Syrg USE TO INJECT EVERY NIGHT 100 Syringe 4    lancets (TRUEPLUS LANCETS) 33 gauge Misc 1 lancet by Misc.(Non-Drug; Combo Route) route 2 (two) times a day. 200 each 0    miconazole nitrate 2% (MICOTIN) 2 % Oint Apply topically 2 (two) times daily. (Patient not taking: Reported on 2/8/2022)  0    NIFEdipine (PROCARDIA-XL) 60 MG (OSM) 24 hr tablet Take 1 tablet (60 mg total) by mouth once daily. 90 tablet 3    triamcinolone acetonide 0.1% (KENALOG) 0.1 % cream Apply topically 2 (two) times daily. 1 Tube 3     No current facility-administered medications on file prior to visit.        Review of patient's allergies indicates:   Allergen Reactions    Penicillins Hives     Other reaction(s): Hives  Patient has received cefdinir, ceftriaxone, cefazolin and cefepime in the past with no documented reactions    Sulfa (sulfonamide antibiotics) Other (See Comments)     Eyad pt states her doctor told her the shakes were possibly caused by an allergy to sulfa       OBJECTIVE:     Vital Signs:  Vitals:    02/07/22 1300   BP: 136/77   Pulse: 75   Resp: 18   Temp: 98 °F (36.7 °C)     Body mass index is 41.19 kg/m².     Physical Exam:  Physical Exam    Constitutional:  " "     General: She is not in acute distress.     Appearance: Normal appearance. She is obese. She is not ill-appearing.   HENT:      Head: Normocephalic and atraumatic.      Mouth/Throat:   Eyes:      Extraocular Movements: Extraocular movements intact.      Pupils: Pupils are equal, round, and reactive to light.   Cardiovascular:      Rate and Rhythm: Normal rate and regular rhythm.      Pulses: Normal pulses.      Heart sounds: Normal heart sounds. No murmur heard.  Pulmonary:      Effort: Pulmonary effort is normal. No respiratory distress.      Breath sounds: Normal breath sounds. No wheezing.   Abdominal:      General: Bowel sounds are normal. There is no distension.      Palpations: Abdomen is soft. There is no mass.      Tenderness: There is no abdominal tenderness. There is no guarding or rebound.   Musculoskeletal:         General: Swelling present. Normal range of motion.      Cervical back: Normal range of motion.      Comments: Camilo taylor noted to BLE "wounds"   Patient states she had wound care today and does not wish to unwrap     Skin:     General: Skin is warm and dry.      Capillary Refill: Capillary refill takes 2 to 3 seconds.      Comments: Venous stasis skin changes noted   Neurological:      General: No focal deficit present.      Mental Status: She is alert and oriented to person, place, and time. Mental status is at baseline.   Psychiatric:         Mood and Affect: Mood normal.         Behavior: Behavior normal.         Thought Content: Thought content normal.         Judgment: Judgment normal.     Laboratory  Lab Results   Component Value Date    WBC 3.58 (L) 02/08/2022    HGB 11.7 (L) 02/08/2022    HCT 37.9 02/08/2022    MCV 92 02/08/2022     02/08/2022     Lab Results   Component Value Date    INR 1.1 01/18/2020    INR 1.1 03/22/2019    INR 1.0 01/06/2018     Lab Results   Component Value Date    HGBA1C 8.4 (H) 01/18/2022     No results for input(s): POCTGLUCOSE in the last 72 " hours.    Diagnostic Results:  n/a    TRANSITION OF CARE:     [facility] Contact Note: 1/24/2022    Family and/or Caretaker present at visit?  Yes.  Diagnostic tests reviewed/disposition: No diagnosic tests pending after this hospitalization.  Disease/illness education: Fall precautions  Home health/community services discussion/referrals: Patient does not have home health established from hospital visit.  They do not need home health.  If needed, we will set up home health for the patient.   Establishment or re-establishment of referral orders for community resources: No other necessary community resources.   Discussion with other health care providers: No discussion with other health care providers necessary.     Transition of Care Visit:     I have reviewed and updated the history and problem list.  I have reconciled the medication list.  I have discussed the hospitalization and current medical issues, prognosis and plans with the patient/family.  I  spent more than 50% of time discussing the care with the patient/family.  Total Face-to-Face Encounter: 60 minutes.    Medications Reconciliation:   I have reconciled the patient's home medications and discharge medications with the patient/family. I have updated all changes.  Refer to After-Visit Medication List.    ASSESSMENT & PLAN:     HIGH RISK CONDITION(S):  Patient has a condition that poses threat to life and bodily function:     There are no diagnoses linked to this encounter.     (HFpEF) heart failure with preserved ejection fraction  Patient is identified as having Diastolic (HFpEF) heart failure that is Chronic. CHF is currently controlled. Latest ECHO performed and demonstrates- Results for orders placed during the hospital encounter of 11/21/20     Echo Color Flow Doppler? Yes     Interpretation Summary  · Mild left atrial enlargement.  · There is left ventricular eccentric hypertrophy.  · The left ventricle is normal in size with normal systolic  function. The estimated ejection fraction is 60%.  · Indeterminate diastolic function.  · Normal right ventricular size with normal right ventricular systolic function.  · The estimated PA systolic pressure is 31 mmHg.  · Normal central venous pressure (3 mmHg).  . Continue home meds and monitor clinical status closely. Monitor on telemetry. Patient is off CHF pathway.  Monitor strict Is&Os and daily weights.  Place on fluid restriction of 1.5 L. Continue to stress to patient importance of self efficacy and  on diet for CHF. Last BNP reviewed- and noted below No results for input(s): BNP, BNPTRIAGEBLO in the last 168 hours..              PAOD (peripheral arterial occlusive disease)  DAPT, statin        Aortic atherosclerosis  Continue ASA, statin        Anemia of chronic disease  Stable  monitor        Type 2 diabetes mellitus with diabetic polyneuropathy, with long-term current use of insulin        Hemoglobin A1C   Date Value Ref Range Status   01/18/2022 8.4 (H) 4.0 - 5.6 % Final   -SSI  -detemir 30 units nightly  -accuchecks ACHS  -diabetic diet           Acute renal failure superimposed on stage 4 chronic kidney disease  -avoid nephrotoxic meds  -renal dose meds  -avoid events causing decreased renal perfusion         Debility  Wheelchair dependent  Inability to walk  Fall precautions        Essential hypertension  BP on admission  Takes procardia at home  Resume and monitor        Hyperlipidemia LDL goal <100  Resume home meds        Lymphedema of both lower extremities  Venous ulcer of both lower extremities with varicose veins  Venous insufficiency of both lower extremities  -Wound care HH        Were controlled substances prescribed?  No    Instructions for the patient:    Scheduled Follow-up :  Future Appointments   Date Time Provider Department Center   2/17/2022 10:15 AM Celestino Ramirez Jr., MD [facility] UROLOGY [facility]   3/8/2022  2:30 PM Deedee Calderon MD [facility] IM [facility]PR[facility]   5/6/2022  9:45 AM  "LAB, [facility] LAB [place]   5/6/2022 10:00 AM SPECIMEN, JULIANE TRUONG SPECLAB [place]   5/17/2022 10:00 AM Jody Murray NP [facility] NEPHR[facility] [place]       After Visit Medication List :     Medication List          Accurate as of February 7, 2022 11:59 PM. If you have any questions, ask your nurse or doctor.            CONTINUE taking these medications    acetaminophen 325 MG tablet  Commonly known as: TYLENOL  Take 2 tablets (650 mg total) by mouth every 6 (six) hours as needed for Pain.     alcohol swabs Padm  Commonly known as: BD ALCOHOL SWABS  Apply 1 each topically 2 (two) times daily.     aspirin 81 MG Chew     atorvastatin 40 MG tablet  Commonly known as: LIPITOR  TAKE 1 TABLET EVERY EVENING     BD INSULIN SYRINGE ULTRA-FINE 0.5 mL 30 gauge x 1/2" Syrg  Generic drug: insulin syringe-needle U-100  USE TO INJECT EVERY NIGHT     blood sugar diagnostic Strp  Commonly known as: TRUE METRIX GLUCOSE TEST STRIP  Test twice daily     cefpodoxime 200 MG tablet  Commonly known as: VANTIN  Take 1 tablet (200 mg total) by mouth 2 (two) times daily. for 13 days     clopidogreL 75 mg tablet  Commonly known as: PLAVIX  Take 1 tablet (75 mg total) by mouth once daily.     DROPLET INSULIN SYR(HALF UNIT) 0.5 mL 30 gauge x 1/2" Syrg  Generic drug: insulin syr/ndl U100 half yesenia  USE TO INJECT EVERY NIGHT     ergocalciferol 50,000 unit Cap  Commonly known as: ERGOCALCIFEROL  Take 1 capsule (50,000 Units total) by mouth every 7 days. Weekly - 12 weeks, then monthly     gentamicin 0.3 % ophthalmic solution  Commonly known as: GARAMYCIN  Place 1 drop into both eyes 3 (three) times daily.     lancets 33 gauge Misc  Commonly known as: TRUEPLUS LANCETS  1 lancet by Misc.(Non-Drug; Combo Route) route 2 (two) times a day.     LANTUS U-100 INSULIN 100 unit/mL injection  Generic drug: insulin glargine  Inject 10-15 Units into the skin every evening. DEPENDING ON SCALE (DISCARD EACH VIAL AFTER 28 DAYS)     miconazole nitrate 2% 2 " % Oint  Commonly known as: MICOTIN  Apply topically 2 (two) times daily.     NIFEdipine 60 MG (OSM) 24 hr tablet  Commonly known as: PROCARDIA-XL  Take 1 tablet (60 mg total) by mouth once daily.     triamcinolone acetonide 0.1% 0.1 % cream  Commonly known as: KENALOG  Apply topically 2 (two) times daily.     TRUE METRIX LEVEL 3 Soln  Generic drug: blood glucose control, high  Used to calibrate weekly            Signature:  Sahra Hancock NP    Patient consent obtained prior to treatment

## 2022-02-15 ENCOUNTER — TELEPHONE (OUTPATIENT)
Dept: NEPHROLOGY | Facility: CLINIC | Age: 77
End: 2022-02-15
Payer: MEDICARE

## 2022-02-15 NOTE — PATIENT INSTRUCTIONS
Instructions:  - [facility] to schedule home follow-up visit with patient as needed.  - Continue all medications, treatments and therapies as ordered.   - Follow all instructions, recommendations as discussed.  - Maintain Safety Precautions at all times.  - Attend all medical appointments as scheduled.  - For worsening symptoms: call Primary Care Physician or Nurse Practitioner.  - For emergencies, call 911 or immediately report to the nearest emergency room.  - Limit Risks of environmental exposure to coronavirus/COVID-19 as discussed including: social distancing, hand hygiene, and limiting departures from the home for necessities only.

## 2022-02-15 NOTE — TELEPHONE ENCOUNTER
----- Message -----   From: Jody Murray NP   Sent: 2/11/2022   To: Sherin Ortiz   Subject: Unread Message Notification                       Hi Ms. Ortiz,     I got your urine culture results and discussed them with one of our physicians here. He recommended we extend the course of antibiotics you had been on for another 5 days.  I have sent in a prescription to your pharmacy. Please note that I changed the dosing to just once a day due to your kidney function.     Please also make sure you follow up with your primary care doctor regarding your blood sugars. They have been high, which puts you at higher risk for increased infections.     In addition to the urologist I referred you too, I am going to refer you to the uro/gynecologist, per Dr. Campbell's recommendation. They deal with women's urology issues, especially UTIs.     Please let me know if you have any questions or concerns.     Thanks,

## 2022-02-28 ENCOUNTER — EXTERNAL HOME HEALTH (OUTPATIENT)
Dept: HOME HEALTH SERVICES | Facility: HOSPITAL | Age: 77
End: 2022-02-28
Payer: MEDICARE

## 2022-03-03 ENCOUNTER — OFFICE VISIT (OUTPATIENT)
Dept: UROLOGY | Facility: CLINIC | Age: 77
End: 2022-03-03
Payer: MEDICARE

## 2022-03-03 VITALS
SYSTOLIC BLOOD PRESSURE: 141 MMHG | DIASTOLIC BLOOD PRESSURE: 77 MMHG | BODY MASS INDEX: 41.18 KG/M2 | WEIGHT: 262.38 LBS | HEART RATE: 70 BPM | HEIGHT: 67 IN

## 2022-03-03 DIAGNOSIS — N12 PYELONEPHRITIS: ICD-10-CM

## 2022-03-03 DIAGNOSIS — N39.0 BACTERIAL UTI: Primary | ICD-10-CM

## 2022-03-03 DIAGNOSIS — N39.0 RECURRENT UTI: ICD-10-CM

## 2022-03-03 DIAGNOSIS — A49.9 BACTERIAL UTI: Primary | ICD-10-CM

## 2022-03-03 PROCEDURE — 1159F MED LIST DOCD IN RCRD: CPT | Mod: HCNC,CPTII,S$GLB, | Performed by: NURSE PRACTITIONER

## 2022-03-03 PROCEDURE — 99205 PR OFFICE/OUTPT VISIT, NEW, LEVL V, 60-74 MIN: ICD-10-PCS | Mod: HCNC,S$GLB,, | Performed by: NURSE PRACTITIONER

## 2022-03-03 PROCEDURE — 3078F PR MOST RECENT DIASTOLIC BLOOD PRESSURE < 80 MM HG: ICD-10-PCS | Mod: HCNC,CPTII,S$GLB, | Performed by: NURSE PRACTITIONER

## 2022-03-03 PROCEDURE — 1160F RVW MEDS BY RX/DR IN RCRD: CPT | Mod: HCNC,CPTII,S$GLB, | Performed by: NURSE PRACTITIONER

## 2022-03-03 PROCEDURE — 87481 CANDIDA DNA AMP PROBE: CPT | Mod: 59,HCNC | Performed by: NURSE PRACTITIONER

## 2022-03-03 PROCEDURE — 3288F PR FALLS RISK ASSESSMENT DOCUMENTED: ICD-10-PCS | Mod: HCNC,CPTII,S$GLB, | Performed by: NURSE PRACTITIONER

## 2022-03-03 PROCEDURE — 1160F PR REVIEW ALL MEDS BY PRESCRIBER/CLIN PHARMACIST DOCUMENTED: ICD-10-PCS | Mod: HCNC,CPTII,S$GLB, | Performed by: NURSE PRACTITIONER

## 2022-03-03 PROCEDURE — 1126F PR PAIN SEVERITY QUANTIFIED, NO PAIN PRESENT: ICD-10-PCS | Mod: HCNC,CPTII,S$GLB, | Performed by: NURSE PRACTITIONER

## 2022-03-03 PROCEDURE — 87186 SC STD MICRODIL/AGAR DIL: CPT | Mod: HCNC | Performed by: NURSE PRACTITIONER

## 2022-03-03 PROCEDURE — 99499 UNLISTED E&M SERVICE: CPT | Mod: S$GLB,,, | Performed by: NURSE PRACTITIONER

## 2022-03-03 PROCEDURE — 99999 PR PBB SHADOW E&M-EST. PATIENT-LVL V: CPT | Mod: PBBFAC,HCNC,, | Performed by: NURSE PRACTITIONER

## 2022-03-03 PROCEDURE — 1101F PR PT FALLS ASSESS DOC 0-1 FALLS W/OUT INJ PAST YR: ICD-10-PCS | Mod: HCNC,CPTII,S$GLB, | Performed by: NURSE PRACTITIONER

## 2022-03-03 PROCEDURE — 3078F DIAST BP <80 MM HG: CPT | Mod: HCNC,CPTII,S$GLB, | Performed by: NURSE PRACTITIONER

## 2022-03-03 PROCEDURE — 1159F PR MEDICATION LIST DOCUMENTED IN MEDICAL RECORD: ICD-10-PCS | Mod: HCNC,CPTII,S$GLB, | Performed by: NURSE PRACTITIONER

## 2022-03-03 PROCEDURE — 99999 PR PBB SHADOW E&M-EST. PATIENT-LVL V: ICD-10-PCS | Mod: PBBFAC,HCNC,, | Performed by: NURSE PRACTITIONER

## 2022-03-03 PROCEDURE — 99205 OFFICE O/P NEW HI 60 MIN: CPT | Mod: HCNC,S$GLB,, | Performed by: NURSE PRACTITIONER

## 2022-03-03 PROCEDURE — 87077 CULTURE AEROBIC IDENTIFY: CPT | Mod: HCNC | Performed by: NURSE PRACTITIONER

## 2022-03-03 PROCEDURE — 1126F AMNT PAIN NOTED NONE PRSNT: CPT | Mod: HCNC,CPTII,S$GLB, | Performed by: NURSE PRACTITIONER

## 2022-03-03 PROCEDURE — 99499 RISK ADDL DX/OHS AUDIT: ICD-10-PCS | Mod: S$GLB,,, | Performed by: NURSE PRACTITIONER

## 2022-03-03 PROCEDURE — 3288F FALL RISK ASSESSMENT DOCD: CPT | Mod: HCNC,CPTII,S$GLB, | Performed by: NURSE PRACTITIONER

## 2022-03-03 PROCEDURE — 1101F PT FALLS ASSESS-DOCD LE1/YR: CPT | Mod: HCNC,CPTII,S$GLB, | Performed by: NURSE PRACTITIONER

## 2022-03-03 PROCEDURE — 3077F PR MOST RECENT SYSTOLIC BLOOD PRESSURE >= 140 MM HG: ICD-10-PCS | Mod: HCNC,CPTII,S$GLB, | Performed by: NURSE PRACTITIONER

## 2022-03-03 PROCEDURE — 87086 URINE CULTURE/COLONY COUNT: CPT | Mod: HCNC | Performed by: NURSE PRACTITIONER

## 2022-03-03 PROCEDURE — 87088 URINE BACTERIA CULTURE: CPT | Mod: HCNC | Performed by: NURSE PRACTITIONER

## 2022-03-03 PROCEDURE — 3077F SYST BP >= 140 MM HG: CPT | Mod: HCNC,CPTII,S$GLB, | Performed by: NURSE PRACTITIONER

## 2022-03-03 RX ORDER — CIPROFLOXACIN 500 MG/1
500 TABLET ORAL 2 TIMES DAILY
Qty: 14 TABLET | Refills: 0 | Status: SHIPPED | OUTPATIENT
Start: 2022-03-03 | End: 2022-03-10

## 2022-03-03 RX ORDER — ESTRADIOL 0.1 MG/G
CREAM VAGINAL
Qty: 30 G | Refills: 11 | Status: SHIPPED | OUTPATIENT
Start: 2022-03-03 | End: 2022-06-06

## 2022-03-03 NOTE — PROGRESS NOTES
CHIEF COMPLAINT:    Mrs. Ortiz is a 77 y.o. female presenting for   Chief Complaint   Patient presents with    Urinary Tract Infection     .  PRESENTING ILLNESS:    Sherin Ortiz is a 77 y.o. female with a PMH of hld, htn, obesity, diabetes mellitus type 2,  who presents for recurrent uti.     New patient to urology department. Referred by nephrology for recurrent uti.  Several factors likely contributing to recurrent uti - obesity, diabetes, and incontinence.    UTI association: none  Bathe/ urinate before and after intercourse: not active  Complete bladder emptying: yes  Takes bubble baths: no  Wipes from front to back: yes  Constipated: no  Daily water intake: >2L  Daily probiotic: no  Cranberry supplements: no  UTI symptoms: urinary frequency  Hx of kidney stones: yes  Smoking history: no    We spoke in depth about preventive measures to avoid future uti.  Advised better diabetic control and losing weight would aid in preventing utis.      Does report having a history of bloody urine.  Last episode was last year some time.      Previous/Current Smoker: no  Radiation therapy to pelvis: no  Chemotherapy: no  Personal/ family history of bladder/ kidney cancer: no  Exposure to harmful chemicals: no  History of kidney stones: no    Does report having some urinary leakage.  States wears pads daily for the incontinence.        REVIEW OF SYSTEMS:    Review of Systems   Constitutional: Negative for chills and fever.   Respiratory: Negative for shortness of breath.    Cardiovascular: Negative for chest pain.   Gastrointestinal: Negative for constipation and diarrhea.   Genitourinary: Negative for dysuria, flank pain, frequency, hematuria and urgency.   Neurological: Negative for dizziness and weakness.        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    Benign essential hypertension 11/14/2012    Cataract     s/p phacoemulsification     Chronic kidney disease (CKD), stage III (moderate) 9/12/2013    Diabetic peripheral neuropathy associated with type 2 diabetes mellitus 11/14/2014    causing right hemiparesis    Gait disorder     Hyperlipidemia     Iritis - Both Eyes 6/10/2013    Kidney stone     Lymphedema     Morbid obesity with BMI of 40.0-44.9, adult 2/18/2015    Nephrolithiasis 4/20/2016    NS (nuclear sclerosis) 4/1/2013    Nuclear sclerosis - Both Eyes 4/29/2013    Preseptal cellulitis - Right Eye 4/29/2013    Proliferative diabetic retinopathy - Both Eyes 4/29/2013    Proliferative diabetic retinopathy, both eyes 4/1/2013    PSC (posterior subcapsular cataract) - Both Eyes 4/29/2013    S/P hernia repair 12/19/2012    TIA (transient ischemic attack) 11/18/2014    Tinea pedis 7/24/2012    Tinea pedis is present on both feet.     Type 2 diabetes mellitus with diabetic polyneuropathy, with long-term current use of insulin 9/18/2015    Type 2 diabetes mellitus with renal manifestations, controlled 12/12/2013    Type 2 diabetes, controlled, with moderate nonproliferative diabetic retinopathy without macular edema 9/17/2015    Ulcer of left lower extremity, limited to breakdown of skin 7/8/2015    Unspecified cerebral artery occlusion with cerebral infarction 11/16/2014    Unspecified venous (peripheral) insufficiency     Ureteral stone with hydronephrosis 1/27/2016    UTI (lower urinary tract infection)     Vaginal infection     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       Family History   Problem Relation Age of Onset    Diabetes Sister     Cataracts Sister     Heart disease Brother     Cataracts Brother     Leukemia Mother     Cancer Neg Hx     Amblyopia Neg Hx     Blindness Neg Hx     Glaucoma Neg Hx     Hypertension Neg Hx     Macular degeneration Neg Hx     Retinal detachment Neg Hx     Strabismus Neg Hx     Stroke Neg Hx     Thyroid disease Neg Hx     Kidney disease Neg Hx   "      Allergies:  Penicillins and Sulfa (sulfonamide antibiotics)    Medications:    Current Outpatient Medications:     acetaminophen (TYLENOL) 325 MG tablet, Take 2 tablets (650 mg total) by mouth every 6 (six) hours as needed for Pain., Disp: , Rfl: 0    aspirin 81 MG Chew, Take 81 mg by mouth once daily., Disp: , Rfl:     atorvastatin (LIPITOR) 40 MG tablet, TAKE 1 TABLET EVERY EVENING (Patient taking differently: Take 40 mg by mouth every evening.), Disp: 90 tablet, Rfl: 3    BD INSULIN SYRINGE ULTRA-FINE 0.5 mL 30 gauge x 1/2" Syrg, USE TO INJECT EVERY NIGHT, Disp: 90 each, Rfl: 3    blood glucose control, high (TRUE METRIX LEVEL 3) Soln, Used to calibrate weekly, Disp: 1 each, Rfl: 3    blood sugar diagnostic (TRUE METRIX GLUCOSE TEST STRIP) Strp, Test twice daily, Disp: 200 strip, Rfl: 3    ciprofloxacin HCl (CIPRO) 500 MG tablet, Take 1 tablet (500 mg total) by mouth 2 (two) times daily. for 7 days, Disp: 14 tablet, Rfl: 0    clopidogreL (PLAVIX) 75 mg tablet, Take 1 tablet (75 mg total) by mouth once daily., Disp: 90 tablet, Rfl: 3    ergocalciferol (ERGOCALCIFEROL) 50,000 unit Cap, Take 1 capsule (50,000 Units total) by mouth every 7 days. Weekly - 12 weeks, then monthly, Disp: 30 capsule, Rfl: 1    estradioL (ESTRACE) 0.01 % (0.1 mg/gram) vaginal cream, Place 1 gm vaginally daily x 2 weeks then place 1 gm vaginally 3 x per week, Disp: 30 g, Rfl: 11    gentamicin (GARAMYCIN) 0.3 % ophthalmic solution, Place 1 drop into both eyes 3 (three) times daily., Disp: 15 mL, Rfl: 0    insulin glargine (LANTUS U-100 INSULIN) 100 unit/mL injection, Inject 10-15 Units into the skin every evening. DEPENDING ON SCALE (DISCARD EACH VIAL AFTER 28 DAYS), Disp: , Rfl:     insulin syr/ndl U100 half yesenia (DROPLET INSULIN SYR HALF UNIT) 0.5 mL 30 gauge x 1/2" Syrg, USE TO INJECT EVERY NIGHT, Disp: 100 Syringe, Rfl: 4    lancets (TRUEPLUS LANCETS) 33 gauge Misc, 1 lancet by Misc.(Non-Drug; Combo Route) route 2 " (two) times a day., Disp: 200 each, Rfl: 0    miconazole nitrate 2% (MICOTIN) 2 % Oint, Apply topically 2 (two) times daily. (Patient not taking: Reported on 2/8/2022), Disp: , Rfl: 0    NIFEdipine (PROCARDIA-XL) 60 MG (OSM) 24 hr tablet, Take 1 tablet (60 mg total) by mouth once daily., Disp: 90 tablet, Rfl: 3    triamcinolone acetonide 0.1% (KENALOG) 0.1 % cream, Apply topically 2 (two) times daily., Disp: 1 Tube, Rfl: 3    PHYSICAL EXAMINATION:    Physical Exam  Vitals and nursing note reviewed.   Constitutional:       Appearance: She is well-developed. She is morbidly obese.   HENT:      Head: Normocephalic and atraumatic.   Eyes:      Pupils: Pupils are equal, round, and reactive to light.   Pulmonary:      Effort: Pulmonary effort is normal.   Genitourinary:     Exam position: Supine.      Labia:         Right: Tenderness present.         Left: Tenderness present.       Vagina: Tenderness and bleeding present.         Musculoskeletal:         General: Normal range of motion.      Cervical back: Normal range of motion.   Skin:     General: Skin is warm and dry.   Neurological:      Mental Status: She is alert and oriented to person, place, and time.   Psychiatric:         Behavior: Behavior normal.       Consent verbally obtained.  Betadine prep was applied to the urethral meatus. An in and out cath was performed after voiding. Urine was cloudy and purulent. The PVR was 40 ml.        LABS:        IMPRESSION:    Encounter Diagnoses   Name Primary?    Pyelonephritis     Recurrent UTI      CT abdomen/pelvis 1/18/22:  Impression:     There is urinary bladder wall thickening and perivesicular haziness, correlation for UTI/cystitis is needed.     There is mild prominence of the middle pole calyx of the left kidney, without additional evidence for hydronephrosis, and without evidence for ureteral calculus or obstructive uropathy otherwise seen, this may relate to sequelae of a recently passed calculus, may relate  to variant anatomy, correlation for UTI/pyelonephritis is needed, note is made there does not appear to be significant perinephric inflammatory change.     Cystic lesion of the right adnexa again noted, stable appearance, clinical and historical correlation is needed.     Bilateral adrenal nodules, likely represent adrenal adenomas.    PLAN:    Problem List Items Addressed This Visit     Pyelonephritis    Relevant Orders    VAGINOSIS SCREEN BY DNA PROBE    Urine culture      Other Visit Diagnoses     Recurrent UTI              1. Recurrent uti   UTI precautions:  Wipe front to back and avoid constipation.  Avoid caffeine.  Drink 1 liter of water daily  Void every 3-4 hrs  Expel urine from vagina post void  Void soon after urge arises  No dryer sheets or harsh detergents with the undergarments  No bubble baths  Void before and after intercourse  Avoid hot tub use  Avoid tight fitting clothes and panty hose  Probiotic- [employer] Ultra 25 Billion CFU Probiotic Complex, Multi Strain.  The Multi Strain is specifically the one that is important as the greater variety of strains is better.    Estrace apply a pea sized amount to urethra 3 x weekly at night    2.  Bacterial uti   Empirically treat with cipro   Send urine for culture    3.  Hx of blood in the urine   Send urine for analysis    4.  RTC in 6 weeks for symptom resolution    I spent 60 minutes with the patient of which more than half was spent in direct consultation with the patient in regards to our treatment and plan.    Ginger Hunt NP

## 2022-03-03 NOTE — PATIENT INSTRUCTIONS
UTI precautions:  Wipe front to back and avoid constipation.  Avoid caffeine.  Drink 1 liter of water daily  Void every 3-4 hrs  Expel urine from vagina post void  Void soon after urge arises  No dryer sheets or harsh detergents with the undergarments  No bubble baths  Void before and after intercourse  Avoid hot tub use  Avoid tight fitting clothes and panty hose  Probiotic- [employer] Ultra 25 Billion CFU Probiotic Complex, Multi Strain.  The Multi Strain is specifically the one that is important as the greater variety of strains is better.    Estrace apply a pea sized amount to urethra 3 x weekly at night

## 2022-03-04 LAB
BACTERIAL VAGINOSIS DNA: NEGATIVE
CANDIDA GLABRATA DNA: POSITIVE
CANDIDA KRUSEI DNA: NEGATIVE
CANDIDA RRNA VAG QL PROBE: NEGATIVE
T VAGINALIS RRNA GENITAL QL PROBE: NEGATIVE

## 2022-03-06 ENCOUNTER — PATIENT MESSAGE (OUTPATIENT)
Dept: UROLOGY | Facility: CLINIC | Age: 77
End: 2022-03-06
Payer: MEDICARE

## 2022-03-06 LAB — BACTERIA UR CULT: ABNORMAL

## 2022-03-06 RX ORDER — FLUCONAZOLE 150 MG/1
150 TABLET ORAL
Qty: 2 TABLET | Refills: 0 | Status: SHIPPED | OUTPATIENT
Start: 2022-03-06 | End: 2022-03-08

## 2022-03-14 RX ORDER — INSULIN GLARGINE 100 [IU]/ML
INJECTION, SOLUTION SUBCUTANEOUS
Qty: 900 ML | Refills: 0 | OUTPATIENT
Start: 2022-03-14

## 2022-03-14 NOTE — TELEPHONE ENCOUNTER
[facility] Note  Quick DC. Inappropriate Request   Refill request requires further review by MD: NO   Medication Therapy Plan: Pharmacy is requesting new script(s) for the following medications without required information, (sig/ frequency/qty/etc)     [facility] action(s):  Quick Discontinue      Duplicate Pended Encounter(s)/ Last Prescribed Details:    Pharmacies have been requesting medications for patients without required information, (sig, frequency, qty, etc.). In addition, requests are sent for medication(s) pt. are currently not taking, and medications patients have never taken.    We have spoken to the pharmacies about these request types and advised their teams previously that we are unable to assess these New Script requests and require all details for these requests. This is a known issue and has been reported.        Medication related problems are not assessed for [facility]DC.   Medication Reconciliation Completed? NO Were there pending details that required adjustment? NO     Automatic Epic Generated Protocol Data Below:   Requested Prescriptions   Pending Prescriptions Disp Refills    insulin glargine (LANTUS U-100 INSULIN) 100 unit/mL injection [Pharmacy Med Name: LANTUS U100 INJ VIAL] 900 mL 0              Appointments      Date Provider   Last Visit   12/6/2021 Deedee Calderon MD   Next Visit   3/14/2022 Deedee Calderon MD        Note composed:1:25 PM 03/14/2022

## 2022-03-14 NOTE — TELEPHONE ENCOUNTER
No new care gaps identified.  Powered by [employer]. Reference number: [id number].   3/14/2022 9:35:44 AM CDT

## 2022-03-14 NOTE — TELEPHONE ENCOUNTER
[facility] Note  Quick DC. Inappropriate Request   Refill request requires further review by MD: NO   Medication Therapy Plan: Pharmacy is requesting new script(s) for the following medications without required information, (sig/ frequency/qty/etc)     [facility] action(s):  Quick Discontinue      Duplicate Pended Encounter(s)/ Last Prescribed Details:    Pharmacies have been requesting medications for patients without required information, (sig, frequency, qty, etc.). In addition, requests are sent for medication(s) pt. are currently not taking, and medications patients have never taken.    We have spoken to the pharmacies about these request types and advised their teams previously that we are unable to assess these New Script requests and require all details for these requests. This is a known issue and has been reported.        Medication related problems are not assessed for [facility].   Medication Reconciliation Completed? NO Were there pending details that required adjustment? NO     Automatic Epic Generated Protocol Data Below:   Requested Prescriptions   Pending Prescriptions Disp Refills    insulin glargine (LANTUS U-100 INSULIN) 100 unit/mL injection [Pharmacy Med Name: LANTUS U100 INJ VIAL] 900 mL 0              Appointments      Date Provider   Last Visit   12/6/2021 Deedee Calderon MD   Next Visit   Visit date not found Deedee Calderon MD        Note composed:1:25 PM 03/14/2022

## 2022-03-14 NOTE — TELEPHONE ENCOUNTER
No new care gaps identified.  Powered by [employer]. Reference number: [id number].   3/14/2022 9:34:47 AM CDT

## 2022-03-15 NOTE — PROGRESS NOTES
Subjective:    Patient ID:  Sherin Ortiz is a 77 y.o. female who presents for evaluation of Venous Insufficiency      HPI    78 y/o female who presents for evaluation. She has a hx of venous insufficiency with ulceration, HFpEF, obesity, DM, lymphedema, HLD, HTN, CKD. Wounds have been present for a few months. Has venous insuff doppler with:  Bilateral superficial venous reflux and right-sided superficial thrombophlebitis of accessory greater saphenous vein     Bilateral Baker's cysts    Has chronic HANDY, unchanged. Denies CP, orthopnea, PND, syncope, palps.     Review of Systems   Constitutional: Positive for malaise/fatigue.   HENT: Negative for congestion.    Eyes: Negative for blurred vision.   Cardiovascular: Positive for dyspnea on exertion and leg swelling. Negative for chest pain, claudication, cyanosis, irregular heartbeat, near-syncope, orthopnea, palpitations, paroxysmal nocturnal dyspnea and syncope.   Respiratory: Negative for shortness of breath.    Endocrine: Negative for polyuria.   Hematologic/Lymphatic: Negative for bleeding problem.   Skin: Positive for poor wound healing. Negative for itching and rash.   Musculoskeletal: Positive for muscle weakness. Negative for joint swelling and muscle cramps.   Gastrointestinal: Negative for abdominal pain, hematemesis, hematochezia, melena, nausea and vomiting.   Genitourinary: Negative for dysuria and hematuria.   Neurological: Positive for weakness. Negative for dizziness, focal weakness, headaches, light-headedness and loss of balance.   Psychiatric/Behavioral: Negative for depression. The patient is not nervous/anxious.         Objective:    Physical Exam  Constitutional:       Appearance: She is well-developed.   HENT:      Head: Normocephalic and atraumatic.   Neck:      Vascular: No JVD.   Cardiovascular:      Rate and Rhythm: Normal rate and regular rhythm.      Pulses:           Carotid pulses are 2+ on the right side and 2+ on the left side.        Radial pulses are 2+ on the right side and 2+ on the left side.        Femoral pulses are 2+ on the right side and 2+ on the left side.       Dorsalis pedis pulses are 2+ on the right side and 2+ on the left side.        Posterior tibial pulses are 2+ on the right side and 2+ on the left side.      Heart sounds: Normal heart sounds.   Pulmonary:      Effort: Pulmonary effort is normal.      Breath sounds: Normal breath sounds.   Abdominal:      General: Bowel sounds are normal.      Palpations: Abdomen is soft.   Musculoskeletal:      Cervical back: Neck supple.      Right lower leg: Edema present.      Left lower leg: Edema present.   Skin:     General: Skin is warm and dry.      Findings: Lesion present.   Neurological:      Mental Status: She is alert and oriented to person, place, and time.   Psychiatric:         Behavior: Behavior normal.         Thought Content: Thought content normal.           Assessment:       1. Venous insufficiency of both lower extremities    2. Venous reflux    3. Lymphedema of both lower extremities    4. Venous ulcer of both lower extremities with varicose veins    5. PAOD (peripheral arterial occlusive disease)    6. Hyperlipidemia LDL goal <100    7. Essential hypertension    8. Benign hypertensive heart and kidney disease with HF and CKD    9. Aortic atherosclerosis    10. Heart failure with preserved ejection fraction, unspecified HF chronicity    11. Morbid obesity with body mass index (BMI) greater than or equal to 50    12. Type 2 diabetes mellitus with diabetic polyneuropathy, with long-term current use of insulin    13. Lymphedema      78 y/o pt with hx and presentation as above. Doing well from a cardiac perspective and compensated from a HF perspective. Has significant reflux and venous ulceration. Will refer to Dr Farfan for eval. Needs to stay active. Discussed the etiology, evaluation, and management of venous insuff, ulceration, HTN, HLD, obesity, HFpEF, DM.  Discussed the importance of med compliance, heart healthy diet, and regular exercise.      Plan:       -Continue current medical management  -Refer to Dr Farfan

## 2022-03-22 ENCOUNTER — PATIENT MESSAGE (OUTPATIENT)
Dept: INTERNAL MEDICINE | Facility: CLINIC | Age: 77
End: 2022-03-22
Payer: MEDICARE

## 2022-03-23 ENCOUNTER — PATIENT MESSAGE (OUTPATIENT)
Dept: INTERNAL MEDICINE | Facility: CLINIC | Age: 77
End: 2022-03-23
Payer: MEDICARE

## 2022-04-03 PROCEDURE — G0179 MD RECERTIFICATION HHA PT: HCPCS | Mod: ,,, | Performed by: INTERNAL MEDICINE

## 2022-04-03 PROCEDURE — G0179 PR HOME HEALTH MD RECERTIFICATION: ICD-10-PCS | Mod: ,,, | Performed by: INTERNAL MEDICINE

## 2022-04-11 ENCOUNTER — HOSPITAL ENCOUNTER (OUTPATIENT)
Dept: WOUND CARE | Facility: HOSPITAL | Age: 77
Discharge: HOME | End: 2022-04-11
Attending: SURGERY
Payer: MEDICARE

## 2022-04-11 VITALS
SYSTOLIC BLOOD PRESSURE: 154 MMHG | WEIGHT: 262 LBS | HEART RATE: 74 BPM | TEMPERATURE: 98 F | HEIGHT: 67 IN | BODY MASS INDEX: 41.12 KG/M2 | DIASTOLIC BLOOD PRESSURE: 68 MMHG

## 2022-04-11 DIAGNOSIS — L97.901 VENOUS ULCER, LIMITED TO BREAKDOWN OF SKIN, UNSPECIFIED SITE, UNSPECIFIED WHETHER VARICOSE VEINS PRESENT: ICD-10-CM

## 2022-04-11 DIAGNOSIS — L60.8 TOENAIL DEFORMITY: ICD-10-CM

## 2022-04-11 DIAGNOSIS — I87.2 VENOUS REFLUX: ICD-10-CM

## 2022-04-11 DIAGNOSIS — I87.2 VENOUS INSUFFICIENCY OF BOTH LOWER EXTREMITIES: Primary | ICD-10-CM

## 2022-04-11 DIAGNOSIS — S51.011A SKIN TEAR OF RIGHT ELBOW WITHOUT COMPLICATION, INITIAL ENCOUNTER: ICD-10-CM

## 2022-04-11 DIAGNOSIS — I89.0 LYMPHEDEMA OF BOTH LOWER EXTREMITIES: ICD-10-CM

## 2022-04-11 DIAGNOSIS — E11.42 TYPE 2 DIABETES MELLITUS WITH DIABETIC POLYNEUROPATHY, WITH LONG-TERM CURRENT USE OF INSULIN: ICD-10-CM

## 2022-04-11 DIAGNOSIS — R60.0 BILATERAL LEG EDEMA: ICD-10-CM

## 2022-04-11 DIAGNOSIS — S91.209A AVULSED TOENAIL, INITIAL ENCOUNTER: ICD-10-CM

## 2022-04-11 DIAGNOSIS — Z79.4 TYPE 2 DIABETES MELLITUS WITH DIABETIC POLYNEUROPATHY, WITH LONG-TERM CURRENT USE OF INSULIN: ICD-10-CM

## 2022-04-11 DIAGNOSIS — I83.009 VENOUS ULCER, LIMITED TO BREAKDOWN OF SKIN, UNSPECIFIED SITE, UNSPECIFIED WHETHER VARICOSE VEINS PRESENT: ICD-10-CM

## 2022-04-11 PROCEDURE — 29581 APPL MULTLAYER CMPRN SYS LEG: CPT

## 2022-04-11 NOTE — PROGRESS NOTES
Subjective:       Patient ID: Sherin Ortiz is a 77 y.o. female.    Chief Complaint: Wound Consult  12/2/2021:  Pt admit to [facility]. Wound consult for nonhealing wounds to bilateral lower extremities. Patient states that wounds have been present for about 1 month. She was seen at [facility] initially and has had home health to change dressings while waiting for this appointment. She denies pain to wounds. She is presently taking doxycycline for a UTI. Wounds do not have any apparent signs of infection at present. Edema noted bilaterally with positive pulses with doppler. Dr Perry assessed patient. Venous ultrasound ordered and scheduled for bilateral lower extremities. Wound care orders and plan of care instructed to patient who voiced understanding. Request sent to DME for CircAide compression for long term management of edema, as patient is unable to don typical compression stockings (even with zippers). Wound care orders sent to [facility]. Patient to follow up in 3 weeks with Dr Perry per pt request.  12/23/21: F/U with Dr. Perry. No open draining areas noted today. Several small newly healed lesions noted on the left lower leg. Large deflated skin blister noted on right lower leg. Patient had BLE ultrasound completed today that reveals venous reflux. Referral placed to Dr. Farfan. Orders sent to   1/13/22: F/U with Dr. Perry. Right lower leg red, edematous, and weeping. No open areas to left lower leg. Culture of right leg done. Patient to start Doxycycline today for cystitis. Hydrofera ready, ABD, cast padding to right lower leg. BLE Co-flex toes to knee. Patient  Scheduled with Dr. Bishop for bilateral venous reflux.    4/11/2022:  Readmit to clinic for trauma wound to right 3rd toe.Patient states that her foot got caught under her scooter causey. Wound dressed and compression to bilateral lower extremities applied. Dr Perry assessed patient and ordered Ag alginate to toe wound.  Instructed patient on plan of care and she voiced understanding. Order/Rx for circ aide compression as long term solution for edema control. Home health for dressing changes between clinic appointments.  F/U in 2 weeks.    Review of Systems    All systems were reviewed and are negative, except that mentioned in the HPI.    Objective:      Temp:  [97.9 °F (36.6 °C)]   Pulse:  [74]   BP: (154)/(68)   Physical Exam  Constitutional:       Appearance: She is well-developed.   HENT:      Head: Normocephalic and atraumatic.   Eyes:      Pupils: Pupils are equal, round, and reactive to light.   Cardiovascular:      Rate and Rhythm: Normal rate.   Pulmonary:      Effort: Pulmonary effort is normal. No respiratory distress.      Breath sounds: No stridor.   Abdominal:      General: There is no distension.   Musculoskeletal:      Cervical back: Normal range of motion.   Skin:     Comments: See Synopsis for wound details   Neurological:      Mental Status: She is alert and oriented to person, place, and time.            Wound 04/11/22 0130 Left anterior;lower Leg (Active)   04/11/22 0130    Pre-existing:    Primary Wound Type:    Side: Left   Orientation: anterior;lower   Location: Leg   Wound Number:    Ankle-Brachial Index:    Pulses: positive   Removal Indication and Assessment:    Wound Outcome:    (Retired) Wound Type:    (Retired) Wound Length (cm):    (Retired) Wound Width (cm):    (Retired) Depth (cm):    Wound Description (Comments):    Removal Indications:    Dressing Appearance Dry;Intact 04/11/22 1400   Drainage Amount None 04/11/22 1400   Wound Length (cm) 0.1 cm 04/11/22 1400   Wound Width (cm) 0.1 cm 04/11/22 1400   Wound Depth (cm) 0.1 cm 04/11/22 1400   Wound Volume (cm^3) 0.001 cm^3 04/11/22 1400   Wound Surface Area (cm^2) 0.01 cm^2 04/11/22 1400   Care Cleansed with:;Soap and water 04/11/22 1400   Compression Two layer compression 04/11/22 1400   Dressing Change Due 04/14/22 04/11/22 1400            Wound  04/11/22 0130 Right anterior;lower Leg (Active)   04/11/22 0130    Pre-existing:    Primary Wound Type:    Side: Right   Orientation: anterior;lower   Location: Leg   Wound Number:    Ankle-Brachial Index:    Pulses: positive   Removal Indication and Assessment:    Wound Outcome:    (Retired) Wound Type:    (Retired) Wound Length (cm):    (Retired) Wound Width (cm):    (Retired) Depth (cm):    Wound Description (Comments):    Removal Indications:    Wound Image   04/11/22 1400   Dressing Appearance Dry;Intact 04/11/22 1400   Drainage Amount None 04/11/22 1400   Wound Length (cm) 0.1 cm 04/11/22 1400   Wound Width (cm) 0.1 cm 04/11/22 1400   Wound Depth (cm) 0.1 cm 04/11/22 1400   Wound Volume (cm^3) 0.001 cm^3 04/11/22 1400   Wound Surface Area (cm^2) 0.01 cm^2 04/11/22 1400   Care Cleansed with:;Soap and water 04/11/22 1400   Compression Two layer compression 04/11/22 1400   Dressing Change Due 04/14/22 04/11/22 1400            Wound 04/11/22 0130 Traumatic Right anterior Toe, third (Active)   04/11/22 0130    Pre-existing: Yes   Primary Wound Type: Traumatic   Side: Right   Orientation: anterior   Location: Toe, third   Wound Number:    Ankle-Brachial Index:    Pulses:    Removal Indication and Assessment:    Wound Outcome:    (Retired) Wound Type:    (Retired) Wound Length (cm):    (Retired) Wound Width (cm):    (Retired) Depth (cm):    Wound Description (Comments):    Removal Indications:    Wound Image   04/11/22 1400   Dressing Appearance Intact;Moist drainage 04/11/22 1400   Drainage Amount Small 04/11/22 1400   Drainage Characteristics/Odor Serosanguineous 04/11/22 1400   Appearance Red 04/11/22 1400   Tissue loss description Full thickness 04/11/22 1400   Periwound Area Intact 04/11/22 1400   Wound Edges Defined 04/11/22 1400   Wound Length (cm) 2.5 cm 04/11/22 1400   Wound Width (cm) 1.5 cm 04/11/22 1400   Wound Depth (cm) 0.1 cm 04/11/22 1400   Wound Volume (cm^3) 0.375 cm^3 04/11/22 1400   Wound Surface  Area (cm^2) 3.75 cm^2 04/11/22 1400   Care Cleansed with:;Sterile normal saline 04/11/22 1400   Dressing Applied;Calcium alginate;Silver;Absorptive Pad 04/11/22 1400   Dressing Change Due 04/14/22 04/11/22 1400       [REMOVED]      Wound 12/02/21 0130 Left anterior Toe, first (Removed)   12/02/21 0130    Pre-existing:    Primary Wound Type:    Side: Left   Orientation: anterior   Location: Toe, first   Wound Number:    Ankle-Brachial Index:    Pulses: Positive with doppler   Removal Indication and Assessment:    Wound Outcome: Other   (Retired) Wound Type:    (Retired) Wound Length (cm):    (Retired) Wound Width (cm):    (Retired) Depth (cm):    Wound Description (Comments):    Removal Indications:    Removed 04/11/22 1504   Dressing Appearance Dry;Intact 04/11/22 1400   Drainage Amount None 04/11/22 1400   Wound Length (cm) 0 cm 04/11/22 1400   Wound Width (cm) 0 cm 04/11/22 1400   Wound Depth (cm) 0 cm 04/11/22 1400   Wound Volume (cm^3) 0 cm^3 04/11/22 1400   Wound Surface Area (cm^2) 0 cm^2 04/11/22 1400   Care Cleansed with:;Soap and water 04/11/22 1400   Periwound Care Moisturizer applied 04/11/22 1400   Dressing Change Due 04/14/22 04/11/22 1400       [REMOVED]      Wound 12/02/21 0130 Right anterior;lower Leg (Removed)   12/02/21 0130    Pre-existing:    Primary Wound Type:    Side: Right   Orientation: anterior;lower   Location: Leg   Wound Number:    Ankle-Brachial Index:    Pulses: positive with doppler   Removal Indication and Assessment:    Wound Outcome: Other   (Retired) Wound Type:    (Retired) Wound Length (cm):    (Retired) Wound Width (cm):    (Retired) Depth (cm):    Wound Description (Comments):    Removal Indications:    Removed 04/11/22 1504   Dressing Appearance Intact;Dry 04/11/22 1400   Drainage Amount None 04/11/22 1400   Wound Length (cm) 0 cm 04/11/22 1400   Wound Width (cm) 0 cm 04/11/22 1400   Wound Depth (cm) 0 cm 04/11/22 1400   Wound Volume (cm^3) 0 cm^3 04/11/22 1400   Wound  Surface Area (cm^2) 0 cm^2 04/11/22 1400   Care Cleansed with:;Soap and water 04/11/22 1400   Compression Two layer compression 04/11/22 1400   Dressing Change Due 04/14/22 04/11/22 1400       [REMOVED]      Wound 01/13/22 1400 Other (comment) Left Leg (Removed)   01/13/22 1400    Pre-existing: Yes   Primary Wound Type: Other   Side: Left   Orientation:    Location: Leg   Wound Number:    Ankle-Brachial Index:    Pulses:    Removal Indication and Assessment:    Wound Outcome: Other   (Retired) Wound Type:    (Retired) Wound Length (cm):    (Retired) Wound Width (cm):    (Retired) Depth (cm):    Wound Description (Comments):    Removal Indications:    Removed 04/11/22 1503   Dressing Appearance Dry;Intact 04/11/22 1400   Drainage Amount None 04/11/22 1400         Assessment:         ICD-10-CM ICD-9-CM   1. Venous insufficiency of both lower extremities  I87.2 459.81   2. Bilateral leg edema  R60.0 782.3   3. Lymphedema of both lower extremities  I89.0 457.1   4. Type 2 diabetes mellitus with diabetic polyneuropathy, with long-term current use of insulin  E11.42 250.60    Z79.4 357.2     V58.67   5. Venous reflux  I87.2 459.81   6. Skin tear of right elbow without complication, initial encounter  S51.011A 881.01         Plan:   Tissue pathology and/or culture taken:  [] Yes [x] No   Sharp debridement performed:   [] Yes [x] No   Labs ordered this visit:   [] Yes [x] No   Imaging ordered this visit:   [] Yes [x] No           Orders Placed This Encounter   Procedures    Change dressing     Cleanse wound with: Normal Saline  Lidocaine:PRN  Primary dressing: AG alginate  Secondary dressing: ABD pad  Edema control: Coflex with calomine bilaterally  Frequency:Mondays, Thursdays and PRN  Follow-up:2 weeks  Home Health: [facility]  Other Orders: Paint toes to right foot with betadine      Circaid ordered  Podiatry cosnult for nail care and partially avulsed toe nail.  All questions answered.  Follow up in about 2  weeks (around 4/25/2022).

## 2022-04-18 ENCOUNTER — PATIENT OUTREACH (OUTPATIENT)
Dept: ADMINISTRATIVE | Facility: OTHER | Age: 77
End: 2022-04-18
Payer: MEDICARE

## 2022-04-18 ENCOUNTER — EXTERNAL HOME HEALTH (OUTPATIENT)
Dept: HOME HEALTH SERVICES | Facility: HOSPITAL | Age: 77
End: 2022-04-18
Payer: MEDICARE

## 2022-04-18 DIAGNOSIS — I87.2 VENOUS INSUFFICIENCY OF BOTH LOWER EXTREMITIES: Primary | ICD-10-CM

## 2022-04-18 DIAGNOSIS — E11.42 TYPE 2 DIABETES MELLITUS WITH DIABETIC POLYNEUROPATHY, WITH LONG-TERM CURRENT USE OF INSULIN: Primary | ICD-10-CM

## 2022-04-18 DIAGNOSIS — Z79.4 TYPE 2 DIABETES MELLITUS WITH DIABETIC POLYNEUROPATHY, WITH LONG-TERM CURRENT USE OF INSULIN: Primary | ICD-10-CM

## 2022-04-18 NOTE — PROGRESS NOTES
Health Maintenance Due   Topic Date Due    COVID-19 Vaccine (1) Never done    Shingles Vaccine (1 of 2) Never done    DEXA Scan  Never done    Pneumococcal Vaccines (Age 65+) (3 - PPSV23 or PCV20) 12/15/2018    Eye Exam  05/29/2020    Foot Exam  05/30/2020    Lipid Panel  03/30/2022     Updates were requested from care everywhere.  Chart was reviewed for overdue Proactive [facility] Encounters (JESSICA) topics (CRS, Breast Cancer Screening, Eye exam)  Health Maintenance has been updated.  LINKS immunization registry triggered.  Immunizations were reconciled.  Order placed for A1c and linked to existing lab appointment.

## 2022-04-19 RX ORDER — ATORVASTATIN CALCIUM 40 MG/1
TABLET, FILM COATED ORAL
Refills: 0 | OUTPATIENT
Start: 2022-04-19

## 2022-04-19 NOTE — TELEPHONE ENCOUNTER
Kellie DC. Inappropriate Request    Refill Authorization Note   Sherin Ortiz  is requesting a refill authorization.  Brief Assessment and Rationale for Refill:  Quick Discontinue  Medication Therapy Plan:    Pharmacy is requesting new scripts for the following medications without required information, (sig/ frequency/qty/etc)       Medication Reconciliation Completed:  No      Comments: Pharmacies have been requesting medications for patients without required information, (sig, frequency, qty, etc.). In addition, requests are sent for medication(s) pt. are currently not taking, and medications patients have never taken.    We have spoken to the pharmacies about these request types and advised their teams previously that we are unable to assess these New Script requests and require all details for these requests. This is a known issue and has been reported.        Note composed:11:17 AM 04/19/2022

## 2022-04-19 NOTE — TELEPHONE ENCOUNTER
Care Due:                  Date            Visit Type   Department     Provider  --------------------------------------------------------------------------------                                NP -                              PRIMARY      [facility] INTERNAL  Last Visit: 12-      CARE ([facility])   [facility]                              EP -                              PRIMARY      [facility] INTERNAL  Next Visit: 06-      CARE ([facility])   [facility]                                                            Last  Test          Frequency    Reason                     Performed    Due Date  --------------------------------------------------------------------------------    HBA1C.......  6 months...  insulin..................  01- 07-    Powered by [employer]. Reference number: [id number].   4/19/2022 11:16:28 AM CDT

## 2022-04-26 RX ORDER — ATORVASTATIN CALCIUM 40 MG/1
TABLET, FILM COATED ORAL
Qty: 90 TABLET | Refills: 0 | OUTPATIENT
Start: 2022-04-26

## 2022-04-26 NOTE — TELEPHONE ENCOUNTER
No new care gaps identified.  Powered by [employer]. Reference number: [id number].   4/26/2022 5:17:08 PM CDT

## 2022-04-26 NOTE — TELEPHONE ENCOUNTER
No new care gaps identified.  Powered by [employer]. Reference number: [id number].   4/26/2022 5:18:07 PM CDT

## 2022-04-27 NOTE — TELEPHONE ENCOUNTER
Kellie DC. Inappropriate Request    Refill Authorization Note   Sherin Ortiz  is requesting a refill authorization.  Brief Assessment and Rationale for Refill:  Quick Discontinue  Medication Therapy Plan:    Pharmacy is requesting new scripts for the following medications without required information, (sig/ frequency/qty/etc)       Medication Reconciliation Completed:  No      Comments: Pharmacies have been requesting medications for patients without required information, (sig, frequency, qty, etc.). In addition, requests are sent for medication(s) pt. are currently not taking, and medications patients have never taken.    We have spoken to the pharmacies about these request types and advised their teams previously that we are unable to assess these New Script requests and require all details for these requests. This is a known issue and has been reported.        Note composed:8:30 PM 04/26/2022

## 2022-04-28 ENCOUNTER — HOSPITAL ENCOUNTER (OUTPATIENT)
Dept: WOUND CARE | Facility: HOSPITAL | Age: 77
Discharge: HOME | End: 2022-04-28
Attending: SURGERY
Payer: MEDICARE

## 2022-04-28 VITALS
DIASTOLIC BLOOD PRESSURE: 74 MMHG | TEMPERATURE: 98 F | HEART RATE: 64 BPM | BODY MASS INDEX: 41.12 KG/M2 | WEIGHT: 262 LBS | HEIGHT: 67 IN | SYSTOLIC BLOOD PRESSURE: 154 MMHG

## 2022-04-28 DIAGNOSIS — I89.0 LYMPHEDEMA OF BOTH LOWER EXTREMITIES: ICD-10-CM

## 2022-04-28 DIAGNOSIS — L97.929 VENOUS ULCER OF BOTH LOWER EXTREMITIES WITH VARICOSE VEINS: ICD-10-CM

## 2022-04-28 DIAGNOSIS — I87.2 VENOUS INSUFFICIENCY OF BOTH LOWER EXTREMITIES: Primary | ICD-10-CM

## 2022-04-28 DIAGNOSIS — Z79.4 TYPE 2 DIABETES MELLITUS WITH DIABETIC POLYNEUROPATHY, WITH LONG-TERM CURRENT USE OF INSULIN: ICD-10-CM

## 2022-04-28 DIAGNOSIS — I87.2 VENOUS REFLUX: ICD-10-CM

## 2022-04-28 DIAGNOSIS — I83.019 VENOUS ULCER OF BOTH LOWER EXTREMITIES WITH VARICOSE VEINS: ICD-10-CM

## 2022-04-28 DIAGNOSIS — L97.901 VENOUS ULCER, LIMITED TO BREAKDOWN OF SKIN: ICD-10-CM

## 2022-04-28 DIAGNOSIS — R60.0 BILATERAL LEG EDEMA: ICD-10-CM

## 2022-04-28 DIAGNOSIS — L97.919 VENOUS ULCER OF BOTH LOWER EXTREMITIES WITH VARICOSE VEINS: ICD-10-CM

## 2022-04-28 DIAGNOSIS — E11.42 TYPE 2 DIABETES MELLITUS WITH DIABETIC POLYNEUROPATHY, WITH LONG-TERM CURRENT USE OF INSULIN: ICD-10-CM

## 2022-04-28 DIAGNOSIS — I83.029 VENOUS ULCER OF BOTH LOWER EXTREMITIES WITH VARICOSE VEINS: ICD-10-CM

## 2022-04-28 DIAGNOSIS — I83.009 VENOUS ULCER, LIMITED TO BREAKDOWN OF SKIN: ICD-10-CM

## 2022-04-28 PROCEDURE — 99213 OFFICE O/P EST LOW 20 MIN: CPT

## 2022-04-28 PROCEDURE — 99212 OFFICE O/P EST SF 10 MIN: CPT

## 2022-04-28 NOTE — PROGRESS NOTES
Subjective:       Patient ID: Sherin Ortiz is a 77 y.o. female.    Chief Complaint: Wound Care    12/2/2021:  Pt admit to [facility]. Wound consult for nonhealing wounds to bilateral lower extremities. Patient states that wounds have been present for about 1 month. She was seen at [facility] initially and has had home health to change dressings while waiting for this appointment. She denies pain to wounds. She is presently taking doxycycline for a UTI. Wounds do not have any apparent signs of infection at present. Edema noted bilaterally with positive pulses with doppler. Dr Perry assessed patient. Venous ultrasound ordered and scheduled for bilateral lower extremities. Wound care orders and plan of care instructed to patient who voiced understanding. Request sent to [facility] for CircAide compression for long term management of edema, as patient is unable to don typical compression stockings (even with zippers). Wound care orders sent to [facility]. Patient to follow up in 3 weeks with Dr Perry per pt request.  12/23/21: F/U with Dr. Perry. No open draining areas noted today. Several small newly healed lesions noted on the left lower leg. Large deflated skin blister noted on right lower leg. Patient had BLE ultrasound completed today that reveals venous reflux. Referral placed to Dr. Farfan. Orders sent to   1/13/22: F/U with Dr. Perry. Right lower leg red, edematous, and weeping. No open areas to left lower leg. Culture of right leg done. Patient to start Doxycycline today for cystitis. Hydrofera ready, ABD, cast padding to right lower leg. BLE Co-flex toes to knee. Patient  Scheduled with Dr. Bishop for bilateral venous reflux.     4/11/2022:  Readmit to clinic for trauma wound to right 3rd toe.Patient states that her foot got caught under her scooter causey. Wound dressed and compression to bilateral lower extremities applied. Dr Perry assessed patient and ordered Ag alginate to toe wound.  Instructed patient on plan of care and she voiced understanding. Order/Rx for circ aide compression as long term solution for edema control. Home health for dressing changes between clinic appointments.  F/U in 2 weeks.  4/28/2022:  Patient to wound care center with no new problems or complaints. Wounds progressing well. [facility] confirmed they have patient's order and are working on it. Patient to bring Circaid stockings to next visit, if available    Review of Systems    All systems were reviewed and are negative, except that mentioned in the HPI.    Objective:      Temp:  [98.3 °F (36.8 °C)]   Pulse:  [64]   BP: (154)/(74)   Physical Exam  Constitutional:       Appearance: She is well-developed.   HENT:      Head: Normocephalic and atraumatic.   Eyes:      Pupils: Pupils are equal, round, and reactive to light.   Cardiovascular:      Rate and Rhythm: Normal rate.   Pulmonary:      Effort: Pulmonary effort is normal. No respiratory distress.      Breath sounds: No stridor.   Abdominal:      General: There is no distension.   Musculoskeletal:      Cervical back: Normal range of motion.   Skin:     Comments: See Synopsis for wound details   Neurological:      Mental Status: She is alert and oriented to person, place, and time.            Wound 04/11/22 0130 Right dorsal Foot (Active)   04/11/22 0130    Pre-existing:    Primary Wound Type:    Side: Right   Orientation: dorsal   Location: Foot   Wound Number:    Ankle-Brachial Index:    Pulses: positive   Removal Indication and Assessment:    Wound Outcome:    (Retired) Wound Type:    (Retired) Wound Length (cm):    (Retired) Wound Width (cm):    (Retired) Depth (cm):    Wound Description (Comments):    Removal Indications:    Wound Image   04/28/22 1500   Dressing Appearance Dry;Intact 04/28/22 1500   Drainage Amount None 04/28/22 1500   Appearance Intact;Pink 04/28/22 1500   Red (%), Wound Tissue Color 100 % 04/28/22 1500   Periwound Area Intact;Dry 04/28/22 1500    Wound Length (cm) 0.2 cm 04/28/22 1500   Wound Width (cm) 0.1 cm 04/28/22 1500   Wound Depth (cm) 0.1 cm 04/28/22 1500   Wound Volume (cm^3) 0.002 cm^3 04/28/22 1500   Wound Surface Area (cm^2) 0.02 cm^2 04/28/22 1500   Care Cleansed with:;Soap and water 04/28/22 1500   Dressing Applied;Changed;Calcium alginate;Silver;Tubular bandage;Island/border 04/28/22 1500   Compression Tubular elasticized bandage 04/28/22 1500   Dressing Change Due 05/02/22 04/28/22 1500         Assessment:         ICD-10-CM ICD-9-CM   1. Venous insufficiency of both lower extremities  I87.2 459.81   2. Bilateral leg edema  R60.0 782.3   3. Lymphedema of both lower extremities  I89.0 457.1   4. Type 2 diabetes mellitus with diabetic polyneuropathy, with long-term current use of insulin  E11.42 250.60    Z79.4 357.2     V58.67   5. Venous reflux  I87.2 459.81   6. Venous ulcer, limited to breakdown of skin  I83.009 707.9    L97.901          Plan:   Tissue pathology and/or culture taken:  [] Yes [x] No   Sharp debridement performed:   [] Yes [x] No   Labs ordered this visit:   [] Yes [x] No   Imaging ordered this visit:   [] Yes [x] No           Cleanse wound with: Normal Saline   Lidocaine:PRN   Primary dressing: AG alginate   Secondary dressing: Small border foams to open areas   Edema control: Tubi  F   Frequency: Weekly   Follow-up: 2 weeks with Dr. Perry Thursday 05/05/22   Home Health: [facility] to do dressing PRN only, family is able to do dressing changes   Other Orders: Protect right toes with small abd pad  Follow up in about 2 weeks (around 5/12/2022).        All questions were answered.  Elevate legs while at rest.  Bring CircAides to  when they are delivered to pt.

## 2022-05-03 ENCOUNTER — DOCUMENT SCAN (OUTPATIENT)
Dept: HOME HEALTH SERVICES | Facility: HOSPITAL | Age: 77
End: 2022-05-03
Payer: MEDICARE

## 2022-05-06 ENCOUNTER — LAB VISIT (OUTPATIENT)
Dept: LAB | Facility: HOSPITAL | Age: 77
End: 2022-05-06
Attending: NURSE PRACTITIONER
Payer: MEDICARE

## 2022-05-06 DIAGNOSIS — N18.32 STAGE 3B CHRONIC KIDNEY DISEASE: ICD-10-CM

## 2022-05-06 LAB
BACTERIA #/AREA URNS AUTO: ABNORMAL /HPF
BILIRUB UR QL STRIP: NEGATIVE
CLARITY UR REFRACT.AUTO: ABNORMAL
COLOR UR AUTO: ABNORMAL
CREAT UR-MCNC: 139 MG/DL (ref 15–325)
GLUCOSE UR QL STRIP: NEGATIVE
HGB UR QL STRIP: NEGATIVE
HYALINE CASTS UR QL AUTO: 0 /LPF
KETONES UR QL STRIP: NEGATIVE
LEUKOCYTE ESTERASE UR QL STRIP: ABNORMAL
MICROSCOPIC COMMENT: ABNORMAL
NITRITE UR QL STRIP: NEGATIVE
PH UR STRIP: 8 [PH] (ref 5–8)
PROT UR QL STRIP: ABNORMAL
PROT UR-MCNC: 353 MG/DL (ref 0–15)
PROT/CREAT UR: 2.54 MG/G{CREAT} (ref 0–0.2)
RBC #/AREA URNS AUTO: 3 /HPF (ref 0–4)
SP GR UR STRIP: 1.01 (ref 1–1.03)
SQUAMOUS #/AREA URNS AUTO: 5 /HPF
TRI-PHOS CRY UR QL COMP ASSIST: ABNORMAL
URN SPEC COLLECT METH UR: ABNORMAL
WBC #/AREA URNS AUTO: >100 /HPF (ref 0–5)
WBC CLUMPS UR QL AUTO: ABNORMAL

## 2022-05-06 PROCEDURE — 81001 URINALYSIS AUTO W/SCOPE: CPT | Performed by: NURSE PRACTITIONER

## 2022-05-06 PROCEDURE — 82570 ASSAY OF URINE CREATININE: CPT | Performed by: NURSE PRACTITIONER

## 2022-05-09 ENCOUNTER — PATIENT MESSAGE (OUTPATIENT)
Dept: NEPHROLOGY | Facility: CLINIC | Age: 77
End: 2022-05-09
Payer: MEDICARE

## 2022-05-11 ENCOUNTER — TELEPHONE (OUTPATIENT)
Dept: NEPHROLOGY | Facility: CLINIC | Age: 77
End: 2022-05-11
Payer: MEDICARE

## 2022-05-11 ENCOUNTER — PATIENT MESSAGE (OUTPATIENT)
Dept: NEPHROLOGY | Facility: CLINIC | Age: 77
End: 2022-05-11
Payer: MEDICARE

## 2022-05-11 DIAGNOSIS — N39.0 UTI (URINARY TRACT INFECTION), UNCOMPLICATED: Primary | ICD-10-CM

## 2022-05-11 DIAGNOSIS — N39.0 RECURRENT UTI: Primary | ICD-10-CM

## 2022-05-11 RX ORDER — CEFDINIR 300 MG/1
300 CAPSULE ORAL DAILY
Qty: 10 CAPSULE | Refills: 0 | Status: SHIPPED | OUTPATIENT
Start: 2022-05-11 | End: 2022-05-21

## 2022-05-11 NOTE — TELEPHONE ENCOUNTER
Can you pls set up urine culture (order in) as well as CBC, RFP,  PTH (orders in from Feb) for today.     Rx sent to pharmacy already.     Thanks

## 2022-05-12 ENCOUNTER — HOSPITAL ENCOUNTER (OUTPATIENT)
Dept: WOUND CARE | Facility: HOSPITAL | Age: 77
Discharge: HOME | End: 2022-05-12
Attending: SURGERY
Payer: MEDICARE

## 2022-05-12 VITALS
HEIGHT: 67 IN | TEMPERATURE: 98 F | SYSTOLIC BLOOD PRESSURE: 135 MMHG | WEIGHT: 262 LBS | HEART RATE: 71 BPM | DIASTOLIC BLOOD PRESSURE: 68 MMHG | BODY MASS INDEX: 41.12 KG/M2

## 2022-05-12 DIAGNOSIS — E11.42 TYPE 2 DIABETES MELLITUS WITH DIABETIC POLYNEUROPATHY, WITH LONG-TERM CURRENT USE OF INSULIN: ICD-10-CM

## 2022-05-12 DIAGNOSIS — L97.919 VENOUS ULCER OF BOTH LOWER EXTREMITIES WITH VARICOSE VEINS: Primary | ICD-10-CM

## 2022-05-12 DIAGNOSIS — I89.0 LYMPHEDEMA OF BOTH LOWER EXTREMITIES: ICD-10-CM

## 2022-05-12 DIAGNOSIS — I83.029 VENOUS ULCER OF BOTH LOWER EXTREMITIES WITH VARICOSE VEINS: Primary | ICD-10-CM

## 2022-05-12 DIAGNOSIS — Z79.4 TYPE 2 DIABETES MELLITUS WITH DIABETIC POLYNEUROPATHY, WITH LONG-TERM CURRENT USE OF INSULIN: ICD-10-CM

## 2022-05-12 DIAGNOSIS — I87.2 VENOUS REFLUX: ICD-10-CM

## 2022-05-12 DIAGNOSIS — R60.0 BILATERAL LEG EDEMA: ICD-10-CM

## 2022-05-12 DIAGNOSIS — L97.929 VENOUS ULCER OF BOTH LOWER EXTREMITIES WITH VARICOSE VEINS: Primary | ICD-10-CM

## 2022-05-12 DIAGNOSIS — I83.019 VENOUS ULCER OF BOTH LOWER EXTREMITIES WITH VARICOSE VEINS: Primary | ICD-10-CM

## 2022-05-12 DIAGNOSIS — I87.2 VENOUS INSUFFICIENCY OF BOTH LOWER EXTREMITIES: ICD-10-CM

## 2022-05-12 PROCEDURE — 29581 APPL MULTLAYER CMPRN SYS LEG: CPT

## 2022-05-12 NOTE — PROGRESS NOTES
Subjective:       Patient ID: Sherin Ortiz is a 77 y.o. female.    Chief Complaint: Venous Stasis (Both lower legs)    12/2/2021:  Pt admit to [facility]. Wound consult for nonhealing wounds to bilateral lower extremities. Patient states that wounds have been present for about 1 month. She was seen at [facility] initially and has had home health to change dressings while waiting for this appointment. She denies pain to wounds. She is presently taking doxycycline for a UTI. Wounds do not have any apparent signs of infection at present. Edema noted bilaterally with positive pulses with doppler. Dr Perry assessed patient. Venous ultrasound ordered and scheduled for bilateral lower extremities. Wound care orders and plan of care instructed to patient who voiced understanding. Request sent to [facility] for CircAide compression for long term management of edema, as patient is unable to don typical compression stockings (even with zippers). Wound care orders sent to home health. Patient to follow up in 3 weeks with Dr Perry per pt request.  12/23/21: F/U with Dr. Perry. No open draining areas noted today. Several small newly healed lesions noted on the left lower leg. Large deflated skin blister noted on right lower leg. Patient had BLE ultrasound completed today that reveals venous reflux. Referral placed to Dr. aFrfan. Orders sent to   1/13/22: F/U with Dr. Perry. Right lower leg red, edematous, and weeping. No open areas to left lower leg. Culture of right leg done. Patient to start Doxycycline today for cystitis. Hydrofera ready, ABD, cast padding to right lower leg. BLE Co-flex toes to knee. Patient  Scheduled with Dr. Bishop for bilateral venous reflux.     4/11/2022:  Readmit to clinic for trauma wound to right 3rd toe.Patient states that her foot got caught under her scooter causey. Wound dressed and compression to bilateral lower extremities applied. Dr Perry assessed patient and ordered Ag  alginate to toe wound. Instructed patient on plan of care and she voiced understanding. Order/Rx for circ aide compression as long term solution for edema control. Home health for dressing changes between clinic appointments.  F/U in 2 weeks.  4/28/2022:  Patient to wound care center with no new problems or complaints. Wounds progressing well[facility] and [facility]. Patient to bring Circaid stockings to next visit, if available  5/12/22: F/U with Dr. Perry. Right lower leg with open undefined breaks in skin. Aquacel AG to open areas. BLE Co-flex with calamine applied toes to knee. Measurements of BLE done today. Will submit order for Circ Aid wraps. Orders sent to .    Review of Systems    All systems were reviewed and are negative, except that mentioned in the HPI.    Objective:        Physical Exam  Constitutional:       Appearance: She is well-developed.   HENT:      Head: Normocephalic and atraumatic.   Eyes:      Pupils: Pupils are equal, round, and reactive to light.   Cardiovascular:      Rate and Rhythm: Normal rate.   Pulmonary:      Effort: Pulmonary effort is normal. No respiratory distress.      Breath sounds: No stridor.   Abdominal:      General: There is no distension.   Musculoskeletal:      Cervical back: Normal range of motion.   Skin:     Comments: See Synopsis for wound details   Neurological:      Mental Status: She is alert and oriented to person, place, and time.            Wound 05/12/22 1300 Other (comment) Left lower Leg (Active)   05/12/22 1300    Pre-existing: Yes   Primary Wound Type: Other   Side: Left   Orientation: lower   Location: Leg   Wound Number:    Ankle-Brachial Index:    Pulses:    Removal Indication and Assessment:    Wound Outcome:    (Retired) Wound Type:    (Retired) Wound Length (cm):    (Retired) Wound Width (cm):    (Retired) Depth (cm):    Wound Description (Comments):    Removal Indications:    Wound Image   05/12/22  1300   Dressing Appearance Moist drainage 05/12/22 1300   Drainage Amount Scant 05/12/22 1300   Drainage Characteristics/Odor Serosanguineous 05/12/22 1300   Appearance Pink;Moist 05/12/22 1300   Tissue loss description Partial thickness 05/12/22 1300   Red (%), Wound Tissue Color 100 % 05/12/22 1300   Periwound Area Edematous 05/12/22 1300   Wound Edges Undefined 05/12/22 1300   Care Cleansed with:;Sterile normal saline 05/12/22 1300   Dressing Applied;Compression wrap 05/12/22 1300   Periwound Care Dry periwound area maintained 05/12/22 1300   Compression Two layer compression 05/12/22 1300   Dressing Change Due 05/19/22 05/12/22 1300            Wound 05/12/22 1300 Other (comment) Right lower Leg (Active)   05/12/22 1300    Pre-existing: Yes   Primary Wound Type: Other   Side: Right   Orientation: lower   Location: Leg   Wound Number:    Ankle-Brachial Index:    Pulses:    Removal Indication and Assessment:    Wound Outcome:    (Retired) Wound Type:    (Retired) Wound Length (cm):    (Retired) Wound Width (cm):    (Retired) Depth (cm):    Wound Description (Comments):    Removal Indications:    Wound Image   05/12/22 1300   Dressing Appearance Moist drainage 05/12/22 1300   Drainage Amount Small 05/12/22 1300   Drainage Characteristics/Odor Serosanguineous 05/12/22 1300   Appearance Red;Moist 05/12/22 1300   Tissue loss description Partial thickness 05/12/22 1300   Red (%), Wound Tissue Color 100 % 05/12/22 1300   Periwound Area Edematous 05/12/22 1300   Wound Edges Undefined 05/12/22 1300   Care Cleansed with:;Sterile normal saline 05/12/22 1300   Dressing Applied;Calcium alginate;Silver;Compression wrap 05/12/22 1300   Periwound Care Dry periwound area maintained 05/12/22 1300   Compression Two layer compression 05/12/22 1300   Dressing Change Due 05/19/22 05/12/22 1300         Assessment:         ICD-10-CM ICD-9-CM   1. Venous ulcer of both lower extremities with varicose veins  I83.019 454.0    I83.029      L97.919     L97.929    2. Venous insufficiency of both lower extremities  I87.2 459.81   3. Bilateral leg edema  R60.0 782.3   4. Lymphedema of both lower extremities  I89.0 457.1   5. Type 2 diabetes mellitus with diabetic polyneuropathy, with long-term current use of insulin  E11.42 250.60    Z79.4 357.2     V58.67   6. Venous reflux  I87.2 459.81         Plan:   Tissue pathology and/or culture taken:  [] Yes [x] No   Sharp debridement performed:   [] Yes [x] No   Labs ordered this visit:   [] Yes [x] No   Imaging ordered this visit:   [] Yes [x] No        Circ aid measurements:    Right:  Foot 27cm                        Left: Foot 27cm             Ankle 29 cm                              Ankle 30cm             Calf 44cm                                 Calf  43.5cm      Orders Placed This Encounter   Procedures    Change dressing     Cleanse wound with: Normal Saline   Lidocaine:PRN   Primary dressing: AG alginate to any open areas  Secondary dressing: Co-flex with calamine to BLE toes to knee  Edema control: Co-flex with calamine to BLE toes to knee  Frequency: Weekly   Follow-up: 2 weeks with Dr. Perry Thursday 05/26/22  Home Health: [facility]: Readmit for wound care. Orders as above. Change dressings Thursdays and prn.  Other Orders: Protect right toes with small abd pad. Measurements done 5/12/22 for circ aids.        Follow up in about 2 weeks (around 5/26/2022).  Patient reports that [facility] company/insurance [facility].  Measurements were obtained today and staff will d/w appropriate party to expedite pt obtaining this DME [place]

## 2022-05-13 ENCOUNTER — LAB VISIT (OUTPATIENT)
Dept: LAB | Facility: HOSPITAL | Age: 77
End: 2022-05-13
Attending: NURSE PRACTITIONER
Payer: MEDICARE

## 2022-05-13 DIAGNOSIS — N39.0 URINARY TRACT INFECTION, SITE NOT SPECIFIED: ICD-10-CM

## 2022-05-13 DIAGNOSIS — N18.32 CHRONIC KIDNEY DISEASE (CKD) STAGE G3B/A1, MODERATELY DECREASED GLOMERULAR FILTRATION RATE (GFR) BETWEEN 30-44 ML/MIN/1.73 SQUARE METER AND ALBUMINURIA CREATININE RATIO LESS THAN 30 MG/G: Primary | ICD-10-CM

## 2022-05-13 LAB
ALBUMIN SERPL BCP-MCNC: 2.8 G/DL (ref 3.5–5.2)
ANION GAP SERPL CALC-SCNC: 9 MMOL/L (ref 8–16)
BASOPHILS # BLD AUTO: 0.02 K/UL (ref 0–0.2)
BASOPHILS NFR BLD: 0.4 % (ref 0–1.9)
BUN SERPL-MCNC: 26 MG/DL (ref 8–23)
CALCIUM SERPL-MCNC: 9.2 MG/DL (ref 8.7–10.5)
CHLORIDE SERPL-SCNC: 105 MMOL/L (ref 95–110)
CO2 SERPL-SCNC: 22 MMOL/L (ref 23–29)
CREAT SERPL-MCNC: 1.8 MG/DL (ref 0.5–1.4)
DIFFERENTIAL METHOD: ABNORMAL
EOSINOPHIL # BLD AUTO: 0.1 K/UL (ref 0–0.5)
EOSINOPHIL NFR BLD: 1.3 % (ref 0–8)
ERYTHROCYTE [DISTWIDTH] IN BLOOD BY AUTOMATED COUNT: 13.2 % (ref 11.5–14.5)
EST. GFR  (AFRICAN AMERICAN): 30.4 ML/MIN/1.73 M^2
EST. GFR  (NON AFRICAN AMERICAN): 26.4 ML/MIN/1.73 M^2
GLUCOSE SERPL-MCNC: 235 MG/DL (ref 70–110)
HCT VFR BLD AUTO: 36 % (ref 37–48.5)
HGB BLD-MCNC: 10.9 G/DL (ref 12–16)
IMM GRANULOCYTES # BLD AUTO: 0.02 K/UL (ref 0–0.04)
IMM GRANULOCYTES NFR BLD AUTO: 0.4 % (ref 0–0.5)
LYMPHOCYTES # BLD AUTO: 1.1 K/UL (ref 1–4.8)
LYMPHOCYTES NFR BLD: 24.4 % (ref 18–48)
MCH RBC QN AUTO: 28.4 PG (ref 27–31)
MCHC RBC AUTO-ENTMCNC: 30.3 G/DL (ref 32–36)
MCV RBC AUTO: 94 FL (ref 82–98)
MONOCYTES # BLD AUTO: 0.4 K/UL (ref 0.3–1)
MONOCYTES NFR BLD: 7.8 % (ref 4–15)
NEUTROPHILS # BLD AUTO: 2.9 K/UL (ref 1.8–7.7)
NEUTROPHILS NFR BLD: 65.7 % (ref 38–73)
NRBC BLD-RTO: 0 /100 WBC
PHOSPHATE SERPL-MCNC: 3 MG/DL (ref 2.7–4.5)
PLATELET # BLD AUTO: 323 K/UL (ref 150–450)
PMV BLD AUTO: 10 FL (ref 9.2–12.9)
POTASSIUM SERPL-SCNC: 5.2 MMOL/L (ref 3.5–5.1)
PTH-INTACT SERPL-MCNC: 343.9 PG/ML (ref 9–77)
RBC # BLD AUTO: 3.84 M/UL (ref 4–5.4)
SODIUM SERPL-SCNC: 136 MMOL/L (ref 136–145)
WBC # BLD AUTO: 4.47 K/UL (ref 3.9–12.7)

## 2022-05-13 PROCEDURE — 87086 URINE CULTURE/COLONY COUNT: CPT | Performed by: NURSE PRACTITIONER

## 2022-05-13 PROCEDURE — 85025 COMPLETE CBC W/AUTO DIFF WBC: CPT | Performed by: NURSE PRACTITIONER

## 2022-05-13 PROCEDURE — 87077 CULTURE AEROBIC IDENTIFY: CPT | Performed by: NURSE PRACTITIONER

## 2022-05-13 PROCEDURE — 83970 ASSAY OF PARATHORMONE: CPT | Performed by: NURSE PRACTITIONER

## 2022-05-13 PROCEDURE — 87088 URINE BACTERIA CULTURE: CPT | Performed by: NURSE PRACTITIONER

## 2022-05-13 PROCEDURE — 87186 SC STD MICRODIL/AGAR DIL: CPT | Performed by: NURSE PRACTITIONER

## 2022-05-13 PROCEDURE — 80069 RENAL FUNCTION PANEL: CPT | Performed by: NURSE PRACTITIONER

## 2022-05-16 LAB — BACTERIA UR CULT: ABNORMAL

## 2022-05-17 ENCOUNTER — PATIENT MESSAGE (OUTPATIENT)
Dept: PODIATRY | Facility: CLINIC | Age: 77
End: 2022-05-17
Payer: MEDICARE

## 2022-05-17 ENCOUNTER — OFFICE VISIT (OUTPATIENT)
Dept: NEPHROLOGY | Facility: CLINIC | Age: 77
End: 2022-05-17
Payer: MEDICARE

## 2022-05-17 ENCOUNTER — TELEPHONE (OUTPATIENT)
Dept: PODIATRY | Facility: CLINIC | Age: 77
End: 2022-05-17

## 2022-05-17 VITALS
SYSTOLIC BLOOD PRESSURE: 149 MMHG | OXYGEN SATURATION: 98 % | DIASTOLIC BLOOD PRESSURE: 72 MMHG | HEART RATE: 68 BPM | WEIGHT: 261.94 LBS | BODY MASS INDEX: 41.11 KG/M2 | HEIGHT: 67 IN

## 2022-05-17 DIAGNOSIS — N39.0 FREQUENT UTI: ICD-10-CM

## 2022-05-17 DIAGNOSIS — I10 HYPERTENSION, UNSPECIFIED TYPE: ICD-10-CM

## 2022-05-17 DIAGNOSIS — E21.3 HYPERPARATHYROIDISM: ICD-10-CM

## 2022-05-17 DIAGNOSIS — N18.4 CHRONIC KIDNEY DISEASE, STAGE 4 (SEVERE): Primary | ICD-10-CM

## 2022-05-17 DIAGNOSIS — Z79.4 TYPE 2 DIABETES MELLITUS WITH HYPOGLYCEMIA WITHOUT COMA, WITH LONG-TERM CURRENT USE OF INSULIN: ICD-10-CM

## 2022-05-17 DIAGNOSIS — E87.5 HYPERKALEMIA: ICD-10-CM

## 2022-05-17 DIAGNOSIS — E11.649 TYPE 2 DIABETES MELLITUS WITH HYPOGLYCEMIA WITHOUT COMA, WITH LONG-TERM CURRENT USE OF INSULIN: ICD-10-CM

## 2022-05-17 PROCEDURE — 1159F MED LIST DOCD IN RCRD: CPT | Mod: CPTII,S$GLB,, | Performed by: NURSE PRACTITIONER

## 2022-05-17 PROCEDURE — 99499 UNLISTED E&M SERVICE: CPT | Mod: S$GLB,,, | Performed by: NURSE PRACTITIONER

## 2022-05-17 PROCEDURE — 99214 PR OFFICE/OUTPT VISIT, EST, LEVL IV, 30-39 MIN: ICD-10-PCS | Mod: S$GLB,,, | Performed by: NURSE PRACTITIONER

## 2022-05-17 PROCEDURE — 3288F FALL RISK ASSESSMENT DOCD: CPT | Mod: CPTII,S$GLB,, | Performed by: NURSE PRACTITIONER

## 2022-05-17 PROCEDURE — 99999 PR PBB SHADOW E&M-EST. PATIENT-LVL V: CPT | Mod: PBBFAC,,, | Performed by: NURSE PRACTITIONER

## 2022-05-17 PROCEDURE — 3077F PR MOST RECENT SYSTOLIC BLOOD PRESSURE >= 140 MM HG: ICD-10-PCS | Mod: CPTII,S$GLB,, | Performed by: NURSE PRACTITIONER

## 2022-05-17 PROCEDURE — 1160F PR REVIEW ALL MEDS BY PRESCRIBER/CLIN PHARMACIST DOCUMENTED: ICD-10-PCS | Mod: CPTII,S$GLB,, | Performed by: NURSE PRACTITIONER

## 2022-05-17 PROCEDURE — 99999 PR PBB SHADOW E&M-EST. PATIENT-LVL V: ICD-10-PCS | Mod: PBBFAC,,, | Performed by: NURSE PRACTITIONER

## 2022-05-17 PROCEDURE — 1101F PR PT FALLS ASSESS DOC 0-1 FALLS W/OUT INJ PAST YR: ICD-10-PCS | Mod: CPTII,S$GLB,, | Performed by: NURSE PRACTITIONER

## 2022-05-17 PROCEDURE — 1160F RVW MEDS BY RX/DR IN RCRD: CPT | Mod: CPTII,S$GLB,, | Performed by: NURSE PRACTITIONER

## 2022-05-17 PROCEDURE — 1101F PT FALLS ASSESS-DOCD LE1/YR: CPT | Mod: CPTII,S$GLB,, | Performed by: NURSE PRACTITIONER

## 2022-05-17 PROCEDURE — 3077F SYST BP >= 140 MM HG: CPT | Mod: CPTII,S$GLB,, | Performed by: NURSE PRACTITIONER

## 2022-05-17 PROCEDURE — 3052F HG A1C>EQUAL 8.0%<EQUAL 9.0%: CPT | Mod: CPTII,S$GLB,, | Performed by: NURSE PRACTITIONER

## 2022-05-17 PROCEDURE — 1159F PR MEDICATION LIST DOCUMENTED IN MEDICAL RECORD: ICD-10-PCS | Mod: CPTII,S$GLB,, | Performed by: NURSE PRACTITIONER

## 2022-05-17 PROCEDURE — 3078F PR MOST RECENT DIASTOLIC BLOOD PRESSURE < 80 MM HG: ICD-10-PCS | Mod: CPTII,S$GLB,, | Performed by: NURSE PRACTITIONER

## 2022-05-17 PROCEDURE — 3078F DIAST BP <80 MM HG: CPT | Mod: CPTII,S$GLB,, | Performed by: NURSE PRACTITIONER

## 2022-05-17 PROCEDURE — 1126F PR PAIN SEVERITY QUANTIFIED, NO PAIN PRESENT: ICD-10-PCS | Mod: CPTII,S$GLB,, | Performed by: NURSE PRACTITIONER

## 2022-05-17 PROCEDURE — 99214 OFFICE O/P EST MOD 30 MIN: CPT | Mod: S$GLB,,, | Performed by: NURSE PRACTITIONER

## 2022-05-17 PROCEDURE — 3288F PR FALLS RISK ASSESSMENT DOCUMENTED: ICD-10-PCS | Mod: CPTII,S$GLB,, | Performed by: NURSE PRACTITIONER

## 2022-05-17 PROCEDURE — 3052F PR MOST RECENT HEMOGLOBIN A1C LEVEL 8.0 - < 9.0%: ICD-10-PCS | Mod: CPTII,S$GLB,, | Performed by: NURSE PRACTITIONER

## 2022-05-17 PROCEDURE — 99499 RISK ADDL DX/OHS AUDIT: ICD-10-PCS | Mod: S$GLB,,, | Performed by: NURSE PRACTITIONER

## 2022-05-17 PROCEDURE — 1126F AMNT PAIN NOTED NONE PRSNT: CPT | Mod: CPTII,S$GLB,, | Performed by: NURSE PRACTITIONER

## 2022-05-17 RX ORDER — ERGOCALCIFEROL 1.25 MG/1
50000 CAPSULE ORAL
Qty: 12 CAPSULE | Refills: 3 | Status: SHIPPED | OUTPATIENT
Start: 2022-05-17 | End: 2022-07-20

## 2022-05-17 NOTE — PATIENT INSTRUCTIONS
Restart ergocalciferol weekly.    Blood and urine on 5/26.    Introduction to CKD class ( we will call you to schedule).    See dietician.

## 2022-05-17 NOTE — PROGRESS NOTES
Subjective:       Patient ID: Sherin Ortiz is a 77 y.o. white female who presents for follow-up evaluation of   No chief complaint on file.      HPI     Last seen by me in April 2021. Last seen by Dr. Choi in January 2018.      Patient presents for f/u of CKD.  Baseline creatinine very labile. Appears to be around 1.4 -1.6 mg/dL. No new labs, though she had an LOREN during an admission in May. Denies NSAIDs.    Hospitalized May 2021 c pyelo for IV antibiotics and hypertensive emergency.  Had heel fracture at some point since Jan. 2021.    Significant hx includes  uncontrolled DM x 50 yrs with diabetic neuropathy and proliferative diabetic retinopathy (laser surgery), morbid obesity, HTN, diastolic dysfunction (?). She also has a hx of kidney stones, she had an episode in January 2017 with right sided hydronephrosis and intervention (passed when stent was placed).     Significant family hx includes: no known renal disease, though son-in-law has ESRD    Last renal US: March 2019, reviewed. No hydronephrosis.    Update 11/15/21:  Last seen by me in July 2021.  Presents for f/u of CKD.  Baseline sCr 1.4-1.6 mg/dL.  Denies NSAID use, recent hospitalizations.   Home BPs: says its SBP in 120s per home health    Update 2/8/22:  Presents for f/u of CKD.  Baseline sCr 1.4-1.6 mg/dL  Admitted in January with pyelonephritis and prescribed ceftriaxone and discharged on cefpodoxime. Urine culture + for klebsiella, enterobacter and proteus. Had LOREN c sCr of 2.2 --> 1.9 (after 500 cc LR bolus) --> 1.7 --> 2.1. Said she did not have UTI symptoms prior to the pyelonephritis.    Home BPs: does not remember; says it is the same as last time she sent BP records from home health    Denies NSAIDs.    Update 5/17/22:  Presents for f/u of CKD.  Baseline was sCr 1.4-1.6 mg/dL. Appears to have stabilized at 1.8-1.9 mg/dL since LOREN in January 2022.  Currently on cefdinir for UTI.  Has had Covid since last visit.  Home BPs: 120s/ cannot  "remember DBP  Denies NSAIDs. Denies recent hospitalizations.      Review of Systems   Respiratory: Negative for shortness of breath.    Cardiovascular: Positive for leg swelling.   Gastrointestinal: Positive for diarrhea (while with Covid). Negative for nausea and vomiting.   Genitourinary: Negative for difficulty urinating, dysuria, frequency, hematuria and urgency.        "usually get infections all the time, but I never know I have them"       Objective:       Blood pressure (!) 149/72, pulse 68, height 5' 7" (1.702 m), weight 118.8 kg (261 lb 14.5 oz), SpO2 98 %.  Physical Exam  Constitutional:       General: She is not in acute distress.     Appearance: Normal appearance. She is well-developed. She is obese. She is not ill-appearing, toxic-appearing or diaphoretic.   Cardiovascular:      Rate and Rhythm: Normal rate.   Pulmonary:      Effort: Pulmonary effort is normal. No respiratory distress.   Abdominal:      Tenderness: There is no right CVA tenderness or left CVA tenderness.   Musculoskeletal:      Cervical back: Neck supple.      Right lower leg: Right lower leg edema: CARMELO legs wrapped with dressings.      Left lower leg: Left lower leg edema:  CARMELO legs wrapped with dressings.   Neurological:      Mental Status: She is alert and oriented to person, place, and time.   Psychiatric:         Mood and Affect: Mood normal.         Behavior: Behavior normal.         Thought Content: Thought content normal.         Judgment: Judgment normal.           Lab Results   Component Value Date    CREATININE 1.8 (H) 05/13/2022    URICACID 13.9 (H) 12/03/2020     Prot/Creat Ratio, Urine   Date Value Ref Range Status   05/06/2022 2.54 (H) 0.00 - 0.20 Final   02/08/2022 1.53 (H) 0.00 - 0.20 Final   01/28/2022 0.96 (H) 0.00 - 0.20 Final     Lab Results   Component Value Date     05/13/2022    K 5.2 (H) 05/13/2022    CO2 22 (L) 05/13/2022     05/13/2022     Lab Results   Component Value Date    .9 (H) " 05/13/2022    CALCIUM 9.2 05/13/2022    CAION 1.22 12/15/2012    PHOS 3.0 05/13/2022     Lab Results   Component Value Date    HGB 10.9 (L) 05/13/2022    WBC 4.47 05/13/2022    HCT 36.0 (L) 05/13/2022      Lab Results   Component Value Date    HGBA1C 8.4 (H) 01/18/2022     05/13/2022    BUN 26 (H) 05/13/2022     Lab Results   Component Value Date    LDLCALC 70.2 03/30/2021         Assessment:       1. Chronic kidney disease, stage 4 (severe)    2. Hyperparathyroidism    3. Type 2 diabetes mellitus with hypoglycemia without coma, with long-term current use of insulin    4. Hyperkalemia    5. Frequent UTI    6. Hypertension, unspecified type        Plan:   CKD stage 3B c eGFR 30 mL/min  - Most likely secondary to diabetic nephropathy (also has retinopathy and neuropathy). However, she also has had evidence of hydronephrosis in the past in an ultrasound with resolution in an follow up CT in 12/17.  Has progressed since LOREN. Needs repeat urine. Educated patient to control BP, BG, weight loss, remain well-hydrated, and avoid NSAIDs to prevent progression of CKD.    UPCR UPCR has proteinuria in setting of UTI. Has hyperkalemia.  Consider starting Veltassa with ARB.    No SGLT2i due to frequent UTIs.  No finerenone due to hyperkalemia.   Acid-base Mild acidosis x 1.   Renal osteodystrophy Ca, phos okay. PTH elevated. Has stopped ergocalciferol per self. Restart today.   Anemia Hgb at goal.   DM Poorly controlled. No recent A1c.   Lipid Management On statin.   ESRD planning Anticipatory guidance provided about timing of dialysis. Start discussions and planning when eGFR is about 20 mL/min; most patients start dialysis between 5-10 mL/min.    Refer to intro to CKD class.       HTN - High today but sounds WNL at home on nifedipine 60 mg  - Reports white coat syndrome  - Consider switching CCB to ARB due to proteinuria. Will likely need K-binder.    Hyperkalemia - Mild. Continue low k diet. See dietician.    Frequent  UTIs- Currently being treated with Cefdinir. Being followed by urology.    All questions patient had were answered.  Asked if further questions. None. F/u in clinic in 3 mos with labs and urine prior to next visit or sooner if needed.  ER for emergency concerns.    Summary of Plan:  1.  UA, UPCR, BMP, A1c on 5/26/22  2. avoid NSAID/ bactrim/ IV contrast/ gadolinium/ aminoglycoside where possible  3. Refer to dietician  4. Restart ergocalciferol weekly  5. Intro to CKD class  6. RTC in 3 mos

## 2022-05-26 ENCOUNTER — OFFICE VISIT (OUTPATIENT)
Dept: UROLOGY | Facility: CLINIC | Age: 77
End: 2022-05-26
Payer: MEDICARE

## 2022-05-26 VITALS
BODY MASS INDEX: 41.02 KG/M2 | HEART RATE: 80 BPM | HEIGHT: 67 IN | SYSTOLIC BLOOD PRESSURE: 152 MMHG | DIASTOLIC BLOOD PRESSURE: 86 MMHG

## 2022-05-26 DIAGNOSIS — E11.69 OBESITY, DIABETES, AND HYPERTENSION SYNDROME: ICD-10-CM

## 2022-05-26 DIAGNOSIS — E66.9 OBESITY, DIABETES, AND HYPERTENSION SYNDROME: ICD-10-CM

## 2022-05-26 DIAGNOSIS — I15.2 OBESITY, DIABETES, AND HYPERTENSION SYNDROME: ICD-10-CM

## 2022-05-26 DIAGNOSIS — Z79.4 TYPE 2 DIABETES MELLITUS WITH HYPOGLYCEMIA WITHOUT COMA, WITH LONG-TERM CURRENT USE OF INSULIN: ICD-10-CM

## 2022-05-26 DIAGNOSIS — E11.59 OBESITY, DIABETES, AND HYPERTENSION SYNDROME: ICD-10-CM

## 2022-05-26 DIAGNOSIS — N39.0 RECURRENT UTI: Primary | ICD-10-CM

## 2022-05-26 DIAGNOSIS — E11.649 TYPE 2 DIABETES MELLITUS WITH HYPOGLYCEMIA WITHOUT COMA, WITH LONG-TERM CURRENT USE OF INSULIN: ICD-10-CM

## 2022-05-26 PROCEDURE — 1126F AMNT PAIN NOTED NONE PRSNT: CPT | Mod: CPTII,S$GLB,, | Performed by: NURSE PRACTITIONER

## 2022-05-26 PROCEDURE — 3288F FALL RISK ASSESSMENT DOCD: CPT | Mod: CPTII,S$GLB,, | Performed by: NURSE PRACTITIONER

## 2022-05-26 PROCEDURE — 3079F PR MOST RECENT DIASTOLIC BLOOD PRESSURE 80-89 MM HG: ICD-10-PCS | Mod: CPTII,S$GLB,, | Performed by: NURSE PRACTITIONER

## 2022-05-26 PROCEDURE — 99999 PR PBB SHADOW E&M-EST. PATIENT-LVL III: CPT | Mod: PBBFAC,,, | Performed by: NURSE PRACTITIONER

## 2022-05-26 PROCEDURE — 3052F PR MOST RECENT HEMOGLOBIN A1C LEVEL 8.0 - < 9.0%: ICD-10-PCS | Mod: CPTII,S$GLB,, | Performed by: NURSE PRACTITIONER

## 2022-05-26 PROCEDURE — 99214 OFFICE O/P EST MOD 30 MIN: CPT | Mod: S$GLB,,, | Performed by: NURSE PRACTITIONER

## 2022-05-26 PROCEDURE — 3077F PR MOST RECENT SYSTOLIC BLOOD PRESSURE >= 140 MM HG: ICD-10-PCS | Mod: CPTII,S$GLB,, | Performed by: NURSE PRACTITIONER

## 2022-05-26 PROCEDURE — 3077F SYST BP >= 140 MM HG: CPT | Mod: CPTII,S$GLB,, | Performed by: NURSE PRACTITIONER

## 2022-05-26 PROCEDURE — 99214 PR OFFICE/OUTPT VISIT, EST, LEVL IV, 30-39 MIN: ICD-10-PCS | Mod: S$GLB,,, | Performed by: NURSE PRACTITIONER

## 2022-05-26 PROCEDURE — 3288F PR FALLS RISK ASSESSMENT DOCUMENTED: ICD-10-PCS | Mod: CPTII,S$GLB,, | Performed by: NURSE PRACTITIONER

## 2022-05-26 PROCEDURE — 1159F PR MEDICATION LIST DOCUMENTED IN MEDICAL RECORD: ICD-10-PCS | Mod: CPTII,S$GLB,, | Performed by: NURSE PRACTITIONER

## 2022-05-26 PROCEDURE — 1126F PR PAIN SEVERITY QUANTIFIED, NO PAIN PRESENT: ICD-10-PCS | Mod: CPTII,S$GLB,, | Performed by: NURSE PRACTITIONER

## 2022-05-26 PROCEDURE — 1101F PR PT FALLS ASSESS DOC 0-1 FALLS W/OUT INJ PAST YR: ICD-10-PCS | Mod: CPTII,S$GLB,, | Performed by: NURSE PRACTITIONER

## 2022-05-26 PROCEDURE — 1160F PR REVIEW ALL MEDS BY PRESCRIBER/CLIN PHARMACIST DOCUMENTED: ICD-10-PCS | Mod: CPTII,S$GLB,, | Performed by: NURSE PRACTITIONER

## 2022-05-26 PROCEDURE — 99999 PR PBB SHADOW E&M-EST. PATIENT-LVL III: ICD-10-PCS | Mod: PBBFAC,,, | Performed by: NURSE PRACTITIONER

## 2022-05-26 PROCEDURE — 1101F PT FALLS ASSESS-DOCD LE1/YR: CPT | Mod: CPTII,S$GLB,, | Performed by: NURSE PRACTITIONER

## 2022-05-26 PROCEDURE — 3052F HG A1C>EQUAL 8.0%<EQUAL 9.0%: CPT | Mod: CPTII,S$GLB,, | Performed by: NURSE PRACTITIONER

## 2022-05-26 PROCEDURE — 1159F MED LIST DOCD IN RCRD: CPT | Mod: CPTII,S$GLB,, | Performed by: NURSE PRACTITIONER

## 2022-05-26 PROCEDURE — 1160F RVW MEDS BY RX/DR IN RCRD: CPT | Mod: CPTII,S$GLB,, | Performed by: NURSE PRACTITIONER

## 2022-05-26 PROCEDURE — 3079F DIAST BP 80-89 MM HG: CPT | Mod: CPTII,S$GLB,, | Performed by: NURSE PRACTITIONER

## 2022-05-26 NOTE — PROGRESS NOTES
CHIEF COMPLAINT:    Mrs. Ortiz is a 77 y.o. female presenting for   Chief Complaint   Patient presents with    Follow-up     Finish all antibiotics      .  PRESENTING ILLNESS:    Sherin Ortiz is a 77 y.o. female with a PMH of hld, htn, obesity, diabetes mellitus type 2,  who presents for recurrent uti follow up.     Last seen in urology clinic 3/3/22 for recurrent UTI.  At that visit discussed the several factors likely contributing to recurrent uti - obesity, diabetes, and incontinence.  We spoke in depth about preventive measures to avoid future uti.  Advised better diabetic control and losing weight would aid in preventing utis.  Has not implemented any of the measures discussed.  Offered health  to aid in weight loss and diabetes control, but patient deferred.        REVIEW OF SYSTEMS:    Review of Systems   Constitutional: Negative for chills and fever.   Respiratory: Negative for shortness of breath.    Cardiovascular: Negative for chest pain.   Gastrointestinal: Negative for constipation and diarrhea.   Genitourinary: Negative for dysuria, flank pain, frequency, hematuria and urgency.   Neurological: Negative for dizziness and weakness.        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    Benign essential hypertension 11/14/2012    Cataract     s/p phacoemulsification    Chronic kidney disease (CKD), stage III (moderate) 9/12/2013    Diabetic peripheral neuropathy associated with type 2 diabetes mellitus 11/14/2014    causing right hemiparesis    Gait disorder     Hyperlipidemia     Iritis - Both Eyes 6/10/2013    Kidney stone     Lymphedema     Morbid obesity with BMI of 40.0-44.9, adult 2/18/2015    Nephrolithiasis 4/20/2016    NS (nuclear sclerosis) 4/1/2013    Nuclear sclerosis - Both Eyes 4/29/2013    Preseptal cellulitis - Right Eye 4/29/2013    Proliferative diabetic retinopathy - Both Eyes 4/29/2013  "   Proliferative diabetic retinopathy, both eyes 4/1/2013    PSC (posterior subcapsular cataract) - Both Eyes 4/29/2013    S/P hernia repair 12/19/2012    TIA (transient ischemic attack) 11/18/2014    Tinea pedis 7/24/2012    Tinea pedis is present on both feet.     Type 2 diabetes mellitus with diabetic polyneuropathy, with long-term current use of insulin 9/18/2015    Type 2 diabetes mellitus with renal manifestations, controlled 12/12/2013    Type 2 diabetes, controlled, with moderate nonproliferative diabetic retinopathy without macular edema 9/17/2015    Ulcer of left lower extremity, limited to breakdown of skin 7/8/2015    Unspecified cerebral artery occlusion with cerebral infarction 11/16/2014    Unspecified venous (peripheral) insufficiency     Ureteral stone with hydronephrosis 1/27/2016    UTI (lower urinary tract infection)     Vaginal infection     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       Family History   Problem Relation Age of Onset    Diabetes Sister     Cataracts Sister     Heart disease Brother     Cataracts Brother     Leukemia Mother     Cancer Neg Hx     Amblyopia Neg Hx     Blindness Neg Hx     Glaucoma Neg Hx     Hypertension Neg Hx     Macular degeneration Neg Hx     Retinal detachment Neg Hx     Strabismus Neg Hx     Stroke Neg Hx     Thyroid disease Neg Hx     Kidney disease Neg Hx        Allergies:  Penicillins and Sulfa (sulfonamide antibiotics)    Medications:    Current Outpatient Medications:     acetaminophen (TYLENOL) 325 MG tablet, Take 2 tablets (650 mg total) by mouth every 6 (six) hours as needed for Pain., Disp: , Rfl: 0    aspirin 81 MG Chew, Take 81 mg by mouth once daily., Disp: , Rfl:     atorvastatin (LIPITOR) 40 MG tablet, TAKE 1 TABLET EVERY EVENING (Patient taking differently: Take 40 mg by mouth every evening.), Disp: 90 tablet, Rfl: 3    BD INSULIN SYRINGE ULTRA-FINE 0.5 mL 30 gauge x 1/2" Syrg, USE TO INJECT EVERY NIGHT, " "Disp: 90 each, Rfl: 3    blood glucose control, high (TRUE METRIX LEVEL 3) Soln, Used to calibrate weekly, Disp: 1 each, Rfl: 3    blood sugar diagnostic (TRUE METRIX GLUCOSE TEST STRIP) Strp, Test twice daily, Disp: 200 strip, Rfl: 3    clopidogreL (PLAVIX) 75 mg tablet, Take 1 tablet (75 mg total) by mouth once daily., Disp: 90 tablet, Rfl: 3    ergocalciferol (ERGOCALCIFEROL) 50,000 unit Cap, Take 1 capsule (50,000 Units total) by mouth every 7 days. Weekly - 12 weeks, then monthly, Disp: 12 capsule, Rfl: 3    estradioL (ESTRACE) 0.01 % (0.1 mg/gram) vaginal cream, Place 1 gm vaginally daily x 2 weeks then place 1 gm vaginally 3 x per week (Patient not taking: No sig reported), Disp: 30 g, Rfl: 11    gentamicin (GARAMYCIN) 0.3 % ophthalmic solution, Place 1 drop into both eyes 3 (three) times daily. (Patient not taking: No sig reported), Disp: 15 mL, Rfl: 0    insulin glargine (LANTUS U-100 INSULIN) 100 unit/mL injection, Inject 10-15 Units into the skin every evening. DEPENDING ON SCALE (DISCARD EACH VIAL AFTER 28 DAYS), Disp: 2 each, Rfl: 3    insulin syr/ndl U100 half yesenia (DROPLET INSULIN SYR HALF UNIT) 0.5 mL 30 gauge x 1/2" Syrg, USE TO INJECT EVERY NIGHT, Disp: 100 Syringe, Rfl: 4    lancets (TRUEPLUS LANCETS) 33 gauge Misc, 1 lancet by Misc.(Non-Drug; Combo Route) route 2 (two) times a day., Disp: 200 each, Rfl: 0    miconazole nitrate 2% (MICOTIN) 2 % Oint, Apply topically 2 (two) times daily. (Patient not taking: No sig reported), Disp: , Rfl: 0    NIFEdipine (PROCARDIA-XL) 60 MG (OSM) 24 hr tablet, Take 1 tablet (60 mg total) by mouth once daily., Disp: 90 tablet, Rfl: 3    triamcinolone acetonide 0.1% (KENALOG) 0.1 % cream, Apply topically 2 (two) times daily. (Patient not taking: No sig reported), Disp: 1 Tube, Rfl: 3    PHYSICAL EXAMINATION:    Physical Exam  Vitals and nursing note reviewed.   Constitutional:       Appearance: She is well-developed. She is morbidly obese.   HENT:      " Head: Normocephalic and atraumatic.   Eyes:      Pupils: Pupils are equal, round, and reactive to light.   Pulmonary:      Effort: Pulmonary effort is normal.   Musculoskeletal:         General: Normal range of motion.      Cervical back: Normal range of motion.   Skin:     General: Skin is warm and dry.   Neurological:      Mental Status: She is alert and oriented to person, place, and time.   Psychiatric:         Behavior: Behavior normal.       LABS:        IMPRESSION:    No diagnosis found.  CT abdomen/pelvis 1/18/22:  Impression:     There is urinary bladder wall thickening and perivesicular haziness, correlation for UTI/cystitis is needed.     There is mild prominence of the middle pole calyx of the left kidney, without additional evidence for hydronephrosis, and without evidence for ureteral calculus or obstructive uropathy otherwise seen, this may relate to sequelae of a recently passed calculus, may relate to variant anatomy, correlation for UTI/pyelonephritis is needed, note is made there does not appear to be significant perinephric inflammatory change.     Cystic lesion of the right adnexa again noted, stable appearance, clinical and historical correlation is needed.     Bilateral adrenal nodules, likely represent adrenal adenomas.    PLAN:    Problem List Items Addressed This Visit    None         1. Recurrent uti   UTI precautions:  Wipe front to back and avoid constipation.  Avoid caffeine.  Drink 1 liter of water daily  Void every 3-4 hrs  Expel urine from vagina post void  Void soon after urge arises  No dryer sheets or harsh detergents with the undergarments  No bubble baths  Void before and after intercourse  Avoid hot tub use  Avoid tight fitting clothes and panty hose  Probiotic- [employer] Ultra 25 Billion CFU Probiotic Complex, Multi Strain.  The Multi Strain is specifically the one that is important as the greater variety of strains is better.    Estrace apply a pea sized amount to urethra 3 x  weekly at night    Will discuss further work up vs prophylactic abx with urologist.    2.  RTC based on discussion with urologist        Ginger Hunt NP

## 2022-05-30 ENCOUNTER — HOSPITAL ENCOUNTER (OUTPATIENT)
Dept: WOUND CARE | Facility: HOSPITAL | Age: 77
Discharge: HOME | End: 2022-05-30
Attending: SURGERY
Payer: MEDICARE

## 2022-05-30 VITALS
TEMPERATURE: 98 F | HEIGHT: 67 IN | WEIGHT: 261 LBS | HEART RATE: 67 BPM | SYSTOLIC BLOOD PRESSURE: 144 MMHG | DIASTOLIC BLOOD PRESSURE: 67 MMHG | BODY MASS INDEX: 40.97 KG/M2

## 2022-05-30 DIAGNOSIS — R60.0 BILATERAL LEG EDEMA: ICD-10-CM

## 2022-05-30 DIAGNOSIS — E11.42 TYPE 2 DIABETES MELLITUS WITH DIABETIC POLYNEUROPATHY, WITH LONG-TERM CURRENT USE OF INSULIN: ICD-10-CM

## 2022-05-30 DIAGNOSIS — I83.029 VENOUS ULCER OF BOTH LOWER EXTREMITIES WITH VARICOSE VEINS: Primary | ICD-10-CM

## 2022-05-30 DIAGNOSIS — Z79.4 TYPE 2 DIABETES MELLITUS WITH DIABETIC POLYNEUROPATHY, WITH LONG-TERM CURRENT USE OF INSULIN: ICD-10-CM

## 2022-05-30 DIAGNOSIS — L97.919 VENOUS ULCER OF BOTH LOWER EXTREMITIES WITH VARICOSE VEINS: Primary | ICD-10-CM

## 2022-05-30 DIAGNOSIS — L97.929 VENOUS ULCER OF BOTH LOWER EXTREMITIES WITH VARICOSE VEINS: Primary | ICD-10-CM

## 2022-05-30 DIAGNOSIS — I83.019 VENOUS ULCER OF BOTH LOWER EXTREMITIES WITH VARICOSE VEINS: Primary | ICD-10-CM

## 2022-05-30 DIAGNOSIS — I87.2 VENOUS INSUFFICIENCY OF BOTH LOWER EXTREMITIES: ICD-10-CM

## 2022-05-30 DIAGNOSIS — I89.0 LYMPHEDEMA OF BOTH LOWER EXTREMITIES: ICD-10-CM

## 2022-05-30 PROCEDURE — 29581 APPL MULTLAYER CMPRN SYS LEG: CPT

## 2022-05-30 NOTE — PROGRESS NOTES
Subjective:       Patient ID: Sherin Ortiz is a 77 y.o. female.    Chief Complaint: Wound Care and Venous Stasis    5/30/22:  F/u with Dr Perry.  No complaints.  Pt has not received CircAid compression wraps.  Talked to clinic , pt's measurements must be taken by [facility], pt unable to go to the [facility] location and never heard back from [facility].  [facility]'s phone number provided to patient.  Orders changed and reviewed with pt, who verbalized understanding.  Pt tolerated compression wraps well.  Continue POC.  F/u with Dr Perry 6/16/22. No new c/o otherwise.    Review of Systems    All systems were reviewed and are negative, except that mentioned in the HPI.    Objective:        Physical Exam  Constitutional:       Appearance: She is well-developed.   HENT:      Head: Normocephalic and atraumatic.   Eyes:      Pupils: Pupils are equal, round, and reactive to light.   Cardiovascular:      Rate and Rhythm: Normal rate.   Pulmonary:      Effort: Pulmonary effort is normal. No respiratory distress.      Breath sounds: No stridor.   Abdominal:      General: There is no distension.   Musculoskeletal:      Cervical back: Normal range of motion.   Skin:     Comments: See Synopsis for wound details   Neurological:      Mental Status: She is alert and oriented to person, place, and time.            Wound 05/12/22 1300 Other (comment) Left lower Leg (Active)   05/12/22 1300    Pre-existing: Yes   Primary Wound Type: Other   Side: Left   Orientation: lower   Location: Leg   Wound Number:    Ankle-Brachial Index:    Pulses:    Removal Indication and Assessment:    Wound Outcome:    (Retired) Wound Type:    (Retired) Wound Length (cm):    (Retired) Wound Width (cm):    (Retired) Depth (cm):    Wound Description (Comments):    Removal Indications:    Wound Image   05/30/22 1300   Dressing Appearance Intact;Moist drainage 05/30/22 1300   Drainage Amount Scant 05/30/22 1300   Drainage Characteristics/Odor  Serosanguineous 05/30/22 1300   Appearance Pink;Moist 05/30/22 1300   Tissue loss description Partial thickness 05/30/22 1300   Red (%), Wound Tissue Color 100 % 05/30/22 1300   Periwound Area Edematous;Hemosiderin Staining 05/30/22 1300   Wound Edges Undefined 05/30/22 1300   Wound Length (cm) 0 cm 05/30/22 1300   Wound Width (cm) 0 cm 05/30/22 1300   Wound Depth (cm) 0 cm 05/30/22 1300   Wound Volume (cm^3) 0 cm^3 05/30/22 1300   Wound Surface Area (cm^2) 0 cm^2 05/30/22 1300   Care Cleansed with:;Soap and water;Sterile normal saline 05/30/22 1300   Dressing Applied;Compression wrap;Other (comment) 05/30/22 1300   Periwound Care Dry periwound area maintained 05/30/22 1300   Compression Two layer compression 05/30/22 1300   Dressing Change Due 06/02/22 05/30/22 1300            Wound 05/12/22 1300 Other (comment) Right lower Leg (Active)   05/12/22 1300    Pre-existing: Yes   Primary Wound Type: Other   Side: Right   Orientation: lower   Location: Leg   Wound Number:    Ankle-Brachial Index:    Pulses:    Removal Indication and Assessment:    Wound Outcome:    (Retired) Wound Type:    (Retired) Wound Length (cm):    (Retired) Wound Width (cm):    (Retired) Depth (cm):    Wound Description (Comments):    Removal Indications:    Wound Image   05/30/22 1300   Dressing Appearance Dry;Moist drainage 05/30/22 1300   Drainage Amount Scant 05/30/22 1300   Drainage Characteristics/Odor Serosanguineous 05/30/22 1300   Appearance Pink;Moist 05/30/22 1300   Tissue loss description Partial thickness 05/30/22 1300   Red (%), Wound Tissue Color 100 % 05/30/22 1300   Periwound Area Edematous;Hemosiderin Staining 05/30/22 1300   Wound Edges Undefined 05/30/22 1300   Wound Length (cm) 0 cm 05/30/22 1300   Wound Width (cm) 0 cm 05/30/22 1300   Wound Depth (cm) 0 cm 05/30/22 1300   Wound Volume (cm^3) 0 cm^3 05/30/22 1300   Wound Surface Area (cm^2) 0 cm^2 05/30/22 1300   Care Cleansed with:;Soap and water;Sterile normal saline  05/30/22 1300   Dressing Applied;Compression wrap;Other (comment) 05/30/22 1300   Periwound Care Absorptive dressing applied 05/30/22 1300   Compression Two layer compression 05/30/22 1300   Dressing Change Due 06/02/22 05/30/22 1300         Assessment:         ICD-10-CM ICD-9-CM   1. Venous ulcer of both lower extremities with varicose veins  I83.019 454.0    I83.029     L97.919     L97.929    2. Venous insufficiency of both lower extremities  I87.2 459.81   3. Bilateral leg edema  R60.0 782.3   4. Lymphedema of both lower extremities  I89.0 457.1   5. Type 2 diabetes mellitus with diabetic polyneuropathy, with long-term current use of insulin  E11.42 250.60    Z79.4 357.2     V58.67         Plan:   Tissue pathology and/or culture taken:  [] Yes [x] No   Sharp debridement performed:   [] Yes [x] No   Labs ordered this visit:   [] Yes [x] No   Imaging ordered this visit:   [] Yes [x] No           Orders Placed This Encounter   Procedures    Change dressing     Bilateral Lower Legs    Cleanse wound with: Normal Saline   Lidocaine:PRN   Primary dressing: Xeroform to any open areas   Secondary dressing: Co-flex with calamine to BLE toes to knee   Edema control: Co-flex with calamine to BLE toes to knee   Frequency: Weekly   Follow-up: 2 weeks with Dr. Perry Thursday 6/16/22    Home Health: [facility]: Orders as above. Change dressings Thursdays and prn.     Other Orders: Protect bilateral toes with small abd pad. [facility] has [facility] order and should call to arrange measurements.  Pt has their phone number as well.      Leg wounds much improved. Areas of irritation noted.  Continue current medical regimen.  Elevate and offload.  All questions answered.  Follow up in 17 days (on 6/16/2022) for Dr Perry.

## 2022-06-03 ENCOUNTER — DOCUMENT SCAN (OUTPATIENT)
Dept: HOME HEALTH SERVICES | Facility: HOSPITAL | Age: 77
End: 2022-06-03
Payer: MEDICARE

## 2022-06-06 ENCOUNTER — OFFICE VISIT (OUTPATIENT)
Dept: CARDIOLOGY | Facility: CLINIC | Age: 77
End: 2022-06-06
Payer: MEDICARE

## 2022-06-06 VITALS — DIASTOLIC BLOOD PRESSURE: 85 MMHG | HEART RATE: 68 BPM | SYSTOLIC BLOOD PRESSURE: 148 MMHG | OXYGEN SATURATION: 94 %

## 2022-06-06 DIAGNOSIS — L97.929 VENOUS ULCER OF BOTH LOWER EXTREMITIES WITH VARICOSE VEINS: ICD-10-CM

## 2022-06-06 DIAGNOSIS — Z99.3 WHEELCHAIR DEPENDENT: Chronic | ICD-10-CM

## 2022-06-06 DIAGNOSIS — I13.0 BENIGN HYPERTENSIVE HEART AND KIDNEY DISEASE WITH HF AND CKD: ICD-10-CM

## 2022-06-06 DIAGNOSIS — E11.69 OBESITY, DIABETES, AND HYPERTENSION SYNDROME: ICD-10-CM

## 2022-06-06 DIAGNOSIS — R53.81 DEBILITY: Chronic | ICD-10-CM

## 2022-06-06 DIAGNOSIS — R26.89 UNABLE TO BEAR WEIGHT: ICD-10-CM

## 2022-06-06 DIAGNOSIS — I50.30 HEART FAILURE WITH PRESERVED EJECTION FRACTION, UNSPECIFIED HF CHRONICITY: ICD-10-CM

## 2022-06-06 DIAGNOSIS — E66.9 OBESITY, UNSPECIFIED CLASSIFICATION, UNSPECIFIED OBESITY TYPE, UNSPECIFIED WHETHER SERIOUS COMORBIDITY PRESENT: ICD-10-CM

## 2022-06-06 DIAGNOSIS — I70.0 AORTIC ATHEROSCLEROSIS: ICD-10-CM

## 2022-06-06 DIAGNOSIS — L97.919 VENOUS ULCER OF BOTH LOWER EXTREMITIES WITH VARICOSE VEINS: ICD-10-CM

## 2022-06-06 DIAGNOSIS — E66.9 OBESITY, DIABETES, AND HYPERTENSION SYNDROME: ICD-10-CM

## 2022-06-06 DIAGNOSIS — E11.59 OBESITY, DIABETES, AND HYPERTENSION SYNDROME: ICD-10-CM

## 2022-06-06 DIAGNOSIS — R60.9 EDEMA, UNSPECIFIED TYPE: Primary | ICD-10-CM

## 2022-06-06 DIAGNOSIS — I15.2 OBESITY, DIABETES, AND HYPERTENSION SYNDROME: ICD-10-CM

## 2022-06-06 DIAGNOSIS — I87.2 VENOUS INSUFFICIENCY OF BOTH LOWER EXTREMITIES: ICD-10-CM

## 2022-06-06 DIAGNOSIS — M14.672 CHARCOT ANKLE, LEFT: ICD-10-CM

## 2022-06-06 DIAGNOSIS — I83.029 VENOUS ULCER OF BOTH LOWER EXTREMITIES WITH VARICOSE VEINS: ICD-10-CM

## 2022-06-06 DIAGNOSIS — I83.019 VENOUS ULCER OF BOTH LOWER EXTREMITIES WITH VARICOSE VEINS: ICD-10-CM

## 2022-06-06 PROCEDURE — 3079F PR MOST RECENT DIASTOLIC BLOOD PRESSURE 80-89 MM HG: ICD-10-PCS | Mod: CPTII,S$GLB,, | Performed by: INTERNAL MEDICINE

## 2022-06-06 PROCEDURE — 1126F PR PAIN SEVERITY QUANTIFIED, NO PAIN PRESENT: ICD-10-PCS | Mod: CPTII,S$GLB,, | Performed by: INTERNAL MEDICINE

## 2022-06-06 PROCEDURE — 99999 PR PBB SHADOW E&M-EST. PATIENT-LVL IV: ICD-10-PCS | Mod: PBBFAC,,, | Performed by: INTERNAL MEDICINE

## 2022-06-06 PROCEDURE — 3077F SYST BP >= 140 MM HG: CPT | Mod: CPTII,S$GLB,, | Performed by: INTERNAL MEDICINE

## 2022-06-06 PROCEDURE — 1159F MED LIST DOCD IN RCRD: CPT | Mod: CPTII,S$GLB,, | Performed by: INTERNAL MEDICINE

## 2022-06-06 PROCEDURE — 99999 PR PBB SHADOW E&M-EST. PATIENT-LVL IV: CPT | Mod: PBBFAC,,, | Performed by: INTERNAL MEDICINE

## 2022-06-06 PROCEDURE — 3052F PR MOST RECENT HEMOGLOBIN A1C LEVEL 8.0 - < 9.0%: ICD-10-PCS | Mod: CPTII,S$GLB,, | Performed by: INTERNAL MEDICINE

## 2022-06-06 PROCEDURE — 3079F DIAST BP 80-89 MM HG: CPT | Mod: CPTII,S$GLB,, | Performed by: INTERNAL MEDICINE

## 2022-06-06 PROCEDURE — 99215 PR OFFICE/OUTPT VISIT, EST, LEVL V, 40-54 MIN: ICD-10-PCS | Mod: S$GLB,,, | Performed by: INTERNAL MEDICINE

## 2022-06-06 PROCEDURE — 3052F HG A1C>EQUAL 8.0%<EQUAL 9.0%: CPT | Mod: CPTII,S$GLB,, | Performed by: INTERNAL MEDICINE

## 2022-06-06 PROCEDURE — 3077F PR MOST RECENT SYSTOLIC BLOOD PRESSURE >= 140 MM HG: ICD-10-PCS | Mod: CPTII,S$GLB,, | Performed by: INTERNAL MEDICINE

## 2022-06-06 PROCEDURE — 99215 OFFICE O/P EST HI 40 MIN: CPT | Mod: S$GLB,,, | Performed by: INTERNAL MEDICINE

## 2022-06-06 PROCEDURE — 1159F PR MEDICATION LIST DOCUMENTED IN MEDICAL RECORD: ICD-10-PCS | Mod: CPTII,S$GLB,, | Performed by: INTERNAL MEDICINE

## 2022-06-06 PROCEDURE — 1126F AMNT PAIN NOTED NONE PRSNT: CPT | Mod: CPTII,S$GLB,, | Performed by: INTERNAL MEDICINE

## 2022-06-06 NOTE — PROGRESS NOTES
Subjective:   Patient ID:  Sherin Ortiz is a 77 y.o. female who presents for management of Chronic Venous Insufficiency w/ Ulceration, Hyperlipidemia, and Hypertension      HPI:       77-year-old female past medical history of venous insufficiency complicated with ulcerations edema venous claudication, diastolic dysfunction, obesity, diabetes, lymphedema, hyperlipidemia, hypertension, chronic kidney disease, physical debilitation, wheelchair/scooter dependent, and depression presenting by recommendation of care team for management of venous insufficiency.      Venous US 12/2021      R GSV 6.4 mm + reflux 2963 msec   R LSV 3.4 mm + reflux 584 msec     L GSV 6.5 mm + reflux 1256 msec    L LSV to small to visualize       No DVT             CEAP 6  VCSS 12      Patient Active Problem List    Diagnosis Date Noted    Avulsed toenail, initial encounter     Toenail deformity     Venous ulcer, limited to breakdown of skin     Obesity, diabetes, and hypertension syndrome 01/20/2022    Pyelonephritis 01/18/2022    Adrenal nodule 01/18/2022    Obesity, morbid (more than 100 lbs over ideal weight or BMI > 40)     Skin ulcer of left great toe with fat layer exposed     Venous insufficiency of both lower extremities     Venous reflux     Ulcer of great toe, left, limited to breakdown of skin 10/27/2021    Venous ulcer of both lower extremities with varicose veins 09/22/2021    UTI due to extended-spectrum beta lactamase (ESBL) producing Escherichia coli 05/17/2021    Unable to bear weight 01/15/2021    Type 2 diabetes mellitus with hypoglycemia without coma, with long-term current use of insulin 01/14/2021    Closed nondisplaced fracture of left calcaneus 01/13/2021    Osteopenia 01/13/2021    Charcot ankle, left 01/13/2021    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  Lt DISHA (1.09) Segment/Brachial Index and PVR waveforms indicate minimal peripheral arterial obstructive disease."  Other Chronic Conditions:  HLD, DM    Medications:  Aspirin 81 mg, Lipi  tor 40 mg      Dermatitis, stasis 10/19/2016    Wheelchair dependent 2015    Type 2 diabetes mellitus with diabetic polyneuropathy, with long-term current use of insulin 2015    Anemia of chronic disease 2015    Hypoalbuminemia 2015    Inability to walk 2015    Morbid obesity with body mass index (BMI) greater than or equal to 50 2015    Acute renal failure superimposed on stage 4 chronic kidney disease 2013    PSC (posterior subcapsular cataract) - Both Eyes 2013    Nuclear sclerosis - Both Eyes 2013    Asteroid hyalosis - Left Eye 2013    Debility 2012    Essential hypertension 2012    Hyperlipidemia LDL goal <100 2012    Bilateral leg edema 2012           Right Arm BP - Sittin/75  Left Arm BP - Sittin/85        LABS    LAST HbA1c  Lab Results   Component Value Date    HGBA1C 8.4 (H) 2022       Lipid panel  Lab Results   Component Value Date    CHOL 130 2021    CHOL 127 2020    CHOL 148 2019     Lab Results   Component Value Date    HDL 42 2021    HDL 49 2020    HDL 51 2019     Lab Results   Component Value Date    LDLCALC 70.2 2021    LDLCALC 59.8 (L) 2020    LDLCALC 68.8 2019     Lab " Results   Component Value Date    TRIG 89 03/30/2021    TRIG 91 09/24/2020    TRIG 141 11/06/2019     Lab Results   Component Value Date    CHOLHDL 32.3 03/30/2021    CHOLHDL 38.6 09/24/2020    CHOLHDL 34.5 11/06/2019            Review of Systems   Constitutional: Positive for weight gain. Negative for diaphoresis, night sweats and weight loss.   HENT: Negative for congestion.    Eyes: Negative for blurred vision, discharge and double vision.   Cardiovascular: Positive for leg swelling. Negative for chest pain, claudication, cyanosis, dyspnea on exertion, irregular heartbeat, near-syncope, orthopnea, palpitations, paroxysmal nocturnal dyspnea and syncope.   Respiratory: Positive for shortness of breath and sleep disturbances due to breathing. Negative for cough and wheezing.    Endocrine: Negative for cold intolerance, heat intolerance and polyphagia.   Hematologic/Lymphatic: Negative for adenopathy and bleeding problem. Does not bruise/bleed easily.   Skin: Positive for poor wound healing. Negative for dry skin and nail changes.   Musculoskeletal: Positive for arthritis and muscle weakness. Negative for back pain, falls, joint pain, myalgias and neck pain.   Gastrointestinal: Negative for bloating, abdominal pain, change in bowel habit and constipation.   Genitourinary: Negative for bladder incontinence, dysuria, flank pain, genital sores and missed menses.   Neurological: Negative for aphonia, brief paralysis, difficulty with concentration, dizziness and weakness.   Psychiatric/Behavioral: Positive for depression. Negative for altered mental status and memory loss. The patient does not have insomnia.    Allergic/Immunologic: Negative for environmental allergies.       Objective:   Physical Exam  Constitutional:       General: She is awake.      Comments: Obese    Uses a scooter        HENT:      Head: Normocephalic and atraumatic.      Jaw: There is normal jaw occlusion.   Eyes:      General: Lids are normal.       Extraocular Movements: Extraocular movements intact.      Conjunctiva/sclera: Conjunctivae normal.   Cardiovascular:      Rate and Rhythm: Regular rhythm. Occasional extrasystoles are present.     Pulses:           Radial pulses are 2+ on the right side and 2+ on the left side.        Femoral pulses are 2+ on the right side and 2+ on the left side.       Popliteal pulses are 1+ on the right side and 1+ on the left side.        Right dorsalis pedis pulse not accessible and left dorsalis pedis pulse not accessible.        Right posterior tibial pulse not accessible and left posterior tibial pulse not accessible.   Pulmonary:      Effort: Pulmonary effort is normal.      Breath sounds: Normal breath sounds.   Abdominal:      Palpations: Abdomen is soft.   Musculoskeletal:      Cervical back: Full passive range of motion without pain and normal range of motion.   Skin:     Findings: Pustular rash:         Comments:     Venous stasis ulcers     See images    Neurological:      Mental Status: She is alert.   Psychiatric:         Behavior: Behavior is cooperative.         Assessment:     1. Edema, unspecified type    2. Obesity, unspecified classification, unspecified obesity type, unspecified whether serious comorbidity present    3. Venous insufficiency of both lower extremities    4. Heart failure with preserved ejection fraction, unspecified HF chronicity    5. Aortic atherosclerosis    6. 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 Obesity, diabetes, and hypertension syndrome    8. Charcot ankle, left    9. Venous ulcer of both lower extremities with varicose veins    10. Wheelchair dependent    11. Debility    12. Unable to bear weight        Plan:       She will have ablation of left greater saphenous vein to aid with healing of her wound.  Later she will return for right greater and lesser saphenous vein ablation.  Although only superficial reflux was mention on the ultrasound report her  clinical presentations also concerning for deep venous reflux disease likely due to morbid obesity.  She will ultimately benefit from weight loss.  She will be referred to bariatric surgery clinic to be evaluated for candidacy of weight loss surgery.  Referral to lymphedema clinic to assist with chronic lymphedema.      Physical therapy evaluate and treat    Treat depression         Continue with current medical plan and lifestyle changes.  Return sooner for concerns or questions. If symptoms persist go to the ED  I have reviewed all pertinent data on this patient       I have reviewed the patient's medical history in detail and updated the computerized patient record.    Orders Placed This Encounter   Procedures    Ambulatory referral/consult to Physical/Occupational Therapy     Standing Status:   Future     Standing Expiration Date:   7/6/2023     Referral Priority:   Routine     Referral Type:   Physical Medicine     Referral Reason:   Specialty Services Required     Number of Visits Requested:   1    Ambulatory referral/consult to Bariatric Surgery     Standing Status:   Future     Standing Expiration Date:   7/6/2023     Referral Priority:   Routine     Referral Type:   Consultation     Referral Reason:   Specialty Services Required     Requested Specialty:   Bariatrics     Number of Visits Requested:   1    Case Request-Cath Lab: Ablation     Standing Status:   Standing     Number of Occurrences:   1     Order Specific Question:   CPT Code:     Answer:   OK CHEMICAL VEIN ABLATION [[id number]]     Order Specific Question:   Medical Necessity:     Answer:   Medically Urgent [101]     Order Specific Question:   Is an on-site pathologist required for this procedure?     Answer:   N/A       Follow up as scheduled. Return sooner for concerns or questions            She expressed verbal understanding and agreed with the plan    -In today's visit, at least 4 established conditions that pose a risk to life or bodily  "function have been addressed and the conditions are severe.    -In today's visit, monitoring for drug toxicity was accomplished.            Patient's Medications   New Prescriptions    No medications on file   Previous Medications    ACETAMINOPHEN (TYLENOL) 325 MG TABLET    Take 2 tablets (650 mg total) by mouth every 6 (six) hours as needed for Pain.    ASPIRIN 81 MG CHEW    Take 81 mg by mouth once daily.    ATORVASTATIN (LIPITOR) 40 MG TABLET    TAKE 1 TABLET EVERY EVENING    BD INSULIN SYRINGE ULTRA-FINE 0.5 ML 30 GAUGE X 1/2" SYRG    USE TO INJECT EVERY NIGHT    BLOOD GLUCOSE CONTROL, HIGH (TRUE METRIX LEVEL 3) SOLN    Used to calibrate weekly    BLOOD SUGAR DIAGNOSTIC (TRUE METRIX GLUCOSE TEST STRIP) STRP    Test twice daily    CLOPIDOGREL (PLAVIX) 75 MG TABLET    Take 1 tablet (75 mg total) by mouth once daily.    ERGOCALCIFEROL (ERGOCALCIFEROL) 50,000 UNIT CAP    Take 1 capsule (50,000 Units total) by mouth every 7 days. Weekly - 12 weeks, then monthly    INSULIN GLARGINE (LANTUS U-100 INSULIN) 100 UNIT/ML INJECTION    Inject 10-15 Units into the skin every evening. DEPENDING ON SCALE (DISCARD EACH VIAL AFTER 28 DAYS)    INSULIN SYR/NDL U100 HALF CARLA (DROPLET INSULIN SYR HALF UNIT) 0.5 ML 30 GAUGE X 1/2" SYRG    USE TO INJECT EVERY NIGHT    LANCETS (TRUEPLUS LANCETS) 33 GAUGE MISC    1 lancet by Misc.(Non-Drug; Combo Route) route 2 (two) times a day.    NIFEDIPINE (PROCARDIA-XL) 60 MG (OSM) 24 HR TABLET    Take 1 tablet (60 mg total) by mouth once daily.   Modified Medications    No medications on file   Discontinued Medications     "

## 2022-06-06 NOTE — H&P (VIEW-ONLY)
Subjective:   Patient ID:  Sherin Ortiz is a 77 y.o. female who presents for management of Chronic Venous Insufficiency w/ Ulceration, Hyperlipidemia, and Hypertension      HPI:       77-year-old female past medical history of venous insufficiency complicated with ulcerations edema venous claudication, diastolic dysfunction, obesity, diabetes, lymphedema, hyperlipidemia, hypertension, chronic kidney disease, physical debilitation, wheelchair/scooter dependent, and depression presenting by recommendation of care team for management of venous insufficiency.      Venous US 12/2021      R GSV 6.4 mm + reflux 2963 msec   R LSV 3.4 mm + reflux 584 msec     L GSV 6.5 mm + reflux 1256 msec    L LSV to small to visualize       No DVT             CEAP 6  VCSS 12      Patient Active Problem List    Diagnosis Date Noted    Avulsed toenail, initial encounter     Toenail deformity     Venous ulcer, limited to breakdown of skin     Obesity, diabetes, and hypertension syndrome 01/20/2022    Pyelonephritis 01/18/2022    Adrenal nodule 01/18/2022    Obesity, morbid (more than 100 lbs over ideal weight or BMI > 40)     Skin ulcer of left great toe with fat layer exposed     Venous insufficiency of both lower extremities     Venous reflux     Ulcer of great toe, left, limited to breakdown of skin 10/27/2021    Venous ulcer of both lower extremities with varicose veins 09/22/2021    UTI due to extended-spectrum beta lactamase (ESBL) producing Escherichia coli 05/17/2021    Unable to bear weight 01/15/2021    Type 2 diabetes mellitus with hypoglycemia without coma, with long-term current use of insulin 01/14/2021    Closed nondisplaced fracture of left calcaneus 01/13/2021    Osteopenia 01/13/2021    Charcot ankle, left 01/13/2021    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  Lt DISHA (1.09) Segment/Brachial Index and PVR waveforms indicate minimal peripheral arterial obstructive disease."  Other Chronic Conditions:  HLD, DM    Medications:  Aspirin 81 mg, Lipi  tor 40 mg      Dermatitis, stasis 10/19/2016    Wheelchair dependent 2015    Type 2 diabetes mellitus with diabetic polyneuropathy, with long-term current use of insulin 2015    Anemia of chronic disease 2015    Hypoalbuminemia 2015    Inability to walk 2015    Morbid obesity with body mass index (BMI) greater than or equal to 50 2015    Acute renal failure superimposed on stage 4 chronic kidney disease 2013    PSC (posterior subcapsular cataract) - Both Eyes 2013    Nuclear sclerosis - Both Eyes 2013    Asteroid hyalosis - Left Eye 2013    Debility 2012    Essential hypertension 2012    Hyperlipidemia LDL goal <100 2012    Bilateral leg edema 2012           Right Arm BP - Sittin/75  Left Arm BP - Sittin/85        LABS    LAST HbA1c  Lab Results   Component Value Date    HGBA1C 8.4 (H) 2022       Lipid panel  Lab Results   Component Value Date    CHOL 130 2021    CHOL 127 2020    CHOL 148 2019     Lab Results   Component Value Date    HDL 42 2021    HDL 49 2020    HDL 51 2019     Lab Results   Component Value Date    LDLCALC 70.2 2021    LDLCALC 59.8 (L) 2020    LDLCALC 68.8 2019     Lab " Results   Component Value Date    TRIG 89 03/30/2021    TRIG 91 09/24/2020    TRIG 141 11/06/2019     Lab Results   Component Value Date    CHOLHDL 32.3 03/30/2021    CHOLHDL 38.6 09/24/2020    CHOLHDL 34.5 11/06/2019            Review of Systems   Constitutional: Positive for weight gain. Negative for diaphoresis, night sweats and weight loss.   HENT: Negative for congestion.    Eyes: Negative for blurred vision, discharge and double vision.   Cardiovascular: Positive for leg swelling. Negative for chest pain, claudication, cyanosis, dyspnea on exertion, irregular heartbeat, near-syncope, orthopnea, palpitations, paroxysmal nocturnal dyspnea and syncope.   Respiratory: Positive for shortness of breath and sleep disturbances due to breathing. Negative for cough and wheezing.    Endocrine: Negative for cold intolerance, heat intolerance and polyphagia.   Hematologic/Lymphatic: Negative for adenopathy and bleeding problem. Does not bruise/bleed easily.   Skin: Positive for poor wound healing. Negative for dry skin and nail changes.   Musculoskeletal: Positive for arthritis and muscle weakness. Negative for back pain, falls, joint pain, myalgias and neck pain.   Gastrointestinal: Negative for bloating, abdominal pain, change in bowel habit and constipation.   Genitourinary: Negative for bladder incontinence, dysuria, flank pain, genital sores and missed menses.   Neurological: Negative for aphonia, brief paralysis, difficulty with concentration, dizziness and weakness.   Psychiatric/Behavioral: Positive for depression. Negative for altered mental status and memory loss. The patient does not have insomnia.    Allergic/Immunologic: Negative for environmental allergies.       Objective:   Physical Exam  Constitutional:       General: She is awake.      Comments: Obese    Uses a scooter        HENT:      Head: Normocephalic and atraumatic.      Jaw: There is normal jaw occlusion.   Eyes:      General: Lids are normal.       Extraocular Movements: Extraocular movements intact.      Conjunctiva/sclera: Conjunctivae normal.   Cardiovascular:      Rate and Rhythm: Regular rhythm. Occasional extrasystoles are present.     Pulses:           Radial pulses are 2+ on the right side and 2+ on the left side.        Femoral pulses are 2+ on the right side and 2+ on the left side.       Popliteal pulses are 1+ on the right side and 1+ on the left side.        Right dorsalis pedis pulse not accessible and left dorsalis pedis pulse not accessible.        Right posterior tibial pulse not accessible and left posterior tibial pulse not accessible.   Pulmonary:      Effort: Pulmonary effort is normal.      Breath sounds: Normal breath sounds.   Abdominal:      Palpations: Abdomen is soft.   Musculoskeletal:      Cervical back: Full passive range of motion without pain and normal range of motion.   Skin:     Findings: Pustular rash:         Comments:     Venous stasis ulcers     See images    Neurological:      Mental Status: She is alert.   Psychiatric:         Behavior: Behavior is cooperative.         Assessment:     1. Edema, unspecified type    2. Obesity, unspecified classification, unspecified obesity type, unspecified whether serious comorbidity present    3. Venous insufficiency of both lower extremities    4. Heart failure with preserved ejection fraction, unspecified HF chronicity    5. Aortic atherosclerosis    6. 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 Obesity, diabetes, and hypertension syndrome    8. Charcot ankle, left    9. Venous ulcer of both lower extremities with varicose veins    10. Wheelchair dependent    11. Debility    12. Unable to bear weight        Plan:       She will have ablation of left greater saphenous vein to aid with healing of her wound.  Later she will return for right greater and lesser saphenous vein ablation.  Although only superficial reflux was mention on the ultrasound report her  clinical presentations also concerning for deep venous reflux disease likely due to morbid obesity.  She will ultimately benefit from weight loss.  She will be referred to bariatric surgery clinic to be evaluated for candidacy of weight loss surgery.  Referral to lymphedema clinic to assist with chronic lymphedema.      Physical therapy evaluate and treat    Treat depression         Continue with current medical plan and lifestyle changes.  Return sooner for concerns or questions. If symptoms persist go to the ED  I have reviewed all pertinent data on this patient       I have reviewed the patient's medical history in detail and updated the computerized patient record.    Orders Placed This Encounter   Procedures    Ambulatory referral/consult to Physical/Occupational Therapy     Standing Status:   Future     Standing Expiration Date:   7/6/2023     Referral Priority:   Routine     Referral Type:   Physical Medicine     Referral Reason:   Specialty Services Required     Number of Visits Requested:   1    Ambulatory referral/consult to Bariatric Surgery     Standing Status:   Future     Standing Expiration Date:   7/6/2023     Referral Priority:   Routine     Referral Type:   Consultation     Referral Reason:   Specialty Services Required     Requested Specialty:   Bariatrics     Number of Visits Requested:   1    Case Request-Cath Lab: Ablation     Standing Status:   Standing     Number of Occurrences:   1     Order Specific Question:   CPT Code:     Answer:   [place] CHEMICAL VEIN ABLATION [[id number]]     Order Specific Question:   Medical Necessity:     Answer:   Medically Urgent [101]     Order Specific Question:   Is an on-site pathologist required for this procedure?     Answer:   N/A       Follow up as scheduled. Return sooner for concerns or questions            She expressed verbal understanding and agreed with the plan    -In today's visit, at least 4 established conditions that pose a risk to life or bodily  "function have been addressed and the conditions are severe.    -In today's visit, monitoring for drug toxicity was accomplished.            Patient's Medications   New Prescriptions    No medications on file   Previous Medications    ACETAMINOPHEN (TYLENOL) 325 MG TABLET    Take 2 tablets (650 mg total) by mouth every 6 (six) hours as needed for Pain.    ASPIRIN 81 MG CHEW    Take 81 mg by mouth once daily.    ATORVASTATIN (LIPITOR) 40 MG TABLET    TAKE 1 TABLET EVERY EVENING    BD INSULIN SYRINGE ULTRA-FINE 0.5 ML 30 GAUGE X 1/2" SYRG    USE TO INJECT EVERY NIGHT    BLOOD GLUCOSE CONTROL, HIGH (TRUE METRIX LEVEL 3) SOLN    Used to calibrate weekly    BLOOD SUGAR DIAGNOSTIC (TRUE METRIX GLUCOSE TEST STRIP) STRP    Test twice daily    CLOPIDOGREL (PLAVIX) 75 MG TABLET    Take 1 tablet (75 mg total) by mouth once daily.    ERGOCALCIFEROL (ERGOCALCIFEROL) 50,000 UNIT CAP    Take 1 capsule (50,000 Units total) by mouth every 7 days. Weekly - 12 weeks, then monthly    INSULIN GLARGINE (LANTUS U-100 INSULIN) 100 UNIT/ML INJECTION    Inject 10-15 Units into the skin every evening. DEPENDING ON SCALE (DISCARD EACH VIAL AFTER 28 DAYS)    INSULIN SYR/NDL U100 HALF CARLA (DROPLET INSULIN SYR HALF UNIT) 0.5 ML 30 GAUGE X 1/2" SYRG    USE TO INJECT EVERY NIGHT    LANCETS (TRUEPLUS LANCETS) 33 GAUGE MISC    1 lancet by Misc.(Non-Drug; Combo Route) route 2 (two) times a day.    NIFEDIPINE (PROCARDIA-XL) 60 MG (OSM) 24 HR TABLET    Take 1 tablet (60 mg total) by mouth once daily.   Modified Medications    No medications on file   Discontinued Medications     "

## 2022-06-09 ENCOUNTER — OFFICE VISIT (OUTPATIENT)
Dept: PODIATRY | Facility: CLINIC | Age: 77
End: 2022-06-09
Payer: MEDICARE

## 2022-06-09 VITALS
HEART RATE: 72 BPM | DIASTOLIC BLOOD PRESSURE: 82 MMHG | BODY MASS INDEX: 40.88 KG/M2 | WEIGHT: 261 LBS | SYSTOLIC BLOOD PRESSURE: 148 MMHG

## 2022-06-09 DIAGNOSIS — S91.209A AVULSED TOENAIL, INITIAL ENCOUNTER: ICD-10-CM

## 2022-06-09 DIAGNOSIS — L60.8 TOENAIL DEFORMITY: ICD-10-CM

## 2022-06-09 PROCEDURE — 99999 PR PBB SHADOW E&M-EST. PATIENT-LVL III: CPT | Mod: PBBFAC,,, | Performed by: PODIATRIST

## 2022-06-09 PROCEDURE — 3079F DIAST BP 80-89 MM HG: CPT | Mod: CPTII,S$GLB,, | Performed by: PODIATRIST

## 2022-06-09 PROCEDURE — 11721 PR DEBRIDEMENT OF NAILS, 6 OR MORE: ICD-10-PCS | Mod: Q9,S$GLB,, | Performed by: PODIATRIST

## 2022-06-09 PROCEDURE — 3079F PR MOST RECENT DIASTOLIC BLOOD PRESSURE 80-89 MM HG: ICD-10-PCS | Mod: CPTII,S$GLB,, | Performed by: PODIATRIST

## 2022-06-09 PROCEDURE — 3077F SYST BP >= 140 MM HG: CPT | Mod: CPTII,S$GLB,, | Performed by: PODIATRIST

## 2022-06-09 PROCEDURE — 99999 PR PBB SHADOW E&M-EST. PATIENT-LVL III: ICD-10-PCS | Mod: PBBFAC,,, | Performed by: PODIATRIST

## 2022-06-09 PROCEDURE — 3077F PR MOST RECENT SYSTOLIC BLOOD PRESSURE >= 140 MM HG: ICD-10-PCS | Mod: CPTII,S$GLB,, | Performed by: PODIATRIST

## 2022-06-09 PROCEDURE — 11721 DEBRIDE NAIL 6 OR MORE: CPT | Mod: Q9,S$GLB,, | Performed by: PODIATRIST

## 2022-06-09 PROCEDURE — 99203 PR OFFICE/OUTPT VISIT, NEW, LEVL III, 30-44 MIN: ICD-10-PCS | Mod: 25,S$GLB,, | Performed by: PODIATRIST

## 2022-06-09 PROCEDURE — 99203 OFFICE O/P NEW LOW 30 MIN: CPT | Mod: 25,S$GLB,, | Performed by: PODIATRIST

## 2022-06-14 ENCOUNTER — TELEPHONE (OUTPATIENT)
Dept: BARIATRICS | Facility: CLINIC | Age: 77
End: 2022-06-14
Payer: MEDICARE

## 2022-06-14 NOTE — TELEPHONE ENCOUNTER
Notified patient of the date & time of financial phone call appt.  Pt aware appt is a telephone call.  Dashboard updated.   Please call patient's home number. [phone]

## 2022-06-15 ENCOUNTER — TELEPHONE (OUTPATIENT)
Dept: BARIATRICS | Facility: CLINIC | Age: 77
End: 2022-06-15
Payer: MEDICARE

## 2022-06-16 ENCOUNTER — HOSPITAL ENCOUNTER (OUTPATIENT)
Dept: WOUND CARE | Facility: HOSPITAL | Age: 77
Discharge: HOME | End: 2022-06-16
Attending: SURGERY
Payer: MEDICARE

## 2022-06-16 VITALS
SYSTOLIC BLOOD PRESSURE: 149 MMHG | DIASTOLIC BLOOD PRESSURE: 67 MMHG | WEIGHT: 261 LBS | BODY MASS INDEX: 40.97 KG/M2 | HEIGHT: 67 IN | HEART RATE: 72 BPM | TEMPERATURE: 98 F

## 2022-06-16 DIAGNOSIS — I83.019 VENOUS ULCER OF BOTH LOWER EXTREMITIES WITH VARICOSE VEINS: ICD-10-CM

## 2022-06-16 DIAGNOSIS — E66.01 OBESITY, MORBID (MORE THAN 100 LBS OVER IDEAL WEIGHT OR BMI > 40): ICD-10-CM

## 2022-06-16 DIAGNOSIS — I83.029 VENOUS ULCER OF BOTH LOWER EXTREMITIES WITH VARICOSE VEINS: ICD-10-CM

## 2022-06-16 DIAGNOSIS — I89.0 LYMPHEDEMA OF BOTH LOWER EXTREMITIES: ICD-10-CM

## 2022-06-16 DIAGNOSIS — E11.42 TYPE 2 DIABETES MELLITUS WITH DIABETIC POLYNEUROPATHY, WITH LONG-TERM CURRENT USE OF INSULIN: ICD-10-CM

## 2022-06-16 DIAGNOSIS — I87.2 VENOUS INSUFFICIENCY OF BOTH LOWER EXTREMITIES: Primary | ICD-10-CM

## 2022-06-16 DIAGNOSIS — L97.919 VENOUS ULCER OF BOTH LOWER EXTREMITIES WITH VARICOSE VEINS: ICD-10-CM

## 2022-06-16 DIAGNOSIS — Z79.4 TYPE 2 DIABETES MELLITUS WITH DIABETIC POLYNEUROPATHY, WITH LONG-TERM CURRENT USE OF INSULIN: ICD-10-CM

## 2022-06-16 DIAGNOSIS — I87.2 VENOUS REFLUX: ICD-10-CM

## 2022-06-16 DIAGNOSIS — L97.929 VENOUS ULCER OF BOTH LOWER EXTREMITIES WITH VARICOSE VEINS: ICD-10-CM

## 2022-06-16 PROCEDURE — 29581 APPL MULTLAYER CMPRN SYS LEG: CPT

## 2022-06-16 NOTE — PROGRESS NOTES
[facility]    Subjective:       Patient ID: Sherin Ortiz is a 77 y.o. female.    Chief Complaint: Wound Care    6/16/22:  F/u with Dr Perry.  Wound to RLE lateral has reopened.  POC and orders changed, reviewed with pt, pt verbalized understanding.  Pt has not called about Circ [facility], had more questions for Dr Perry, which were answered today.  Pt states she still has the number and information and that she will call.  Orders faxed to home health.  F/u in 2 weeks with Dr Perry, 6/30/22.    Review of Systems    All systems were reviewed and are negative, except that mentioned in the HPI.    Objective:        Physical Exam  Constitutional:       Appearance: She is well-developed.   HENT:      Head: Normocephalic and atraumatic.   Eyes:      Pupils: Pupils are equal, round, and reactive to light.   Cardiovascular:      Rate and Rhythm: Normal rate.   Pulmonary:      Effort: Pulmonary effort is normal. No respiratory distress.      Breath sounds: No stridor.   Abdominal:      General: There is no distension.   Musculoskeletal:      Cervical back: Normal range of motion.   Skin:     Comments: See Synopsis for wound details   Neurological:      Mental Status: She is alert and oriented to person, place, and time.            Wound 05/12/22 1300 Other (comment) Left lower Leg (Active)   05/12/22 1300    Pre-existing: Yes   Primary Wound Type: Other   Side: Left   Orientation: lower   Location: Leg   Wound Number:    Ankle-Brachial Index:    Pulses:    Removal Indication and Assessment:    Wound Outcome:    (Retired) Wound Type:    (Retired) Wound Length (cm):    (Retired) Wound Width (cm):    (Retired) Depth (cm):    Wound Description (Comments):    Removal Indications:    Wound Image   06/16/22 1400   Dressing Appearance Intact;Moist drainage 06/16/22 1400   Drainage Amount Small 06/16/22 1400   Drainage Characteristics/Odor Serosanguineous 06/16/22 1400    Appearance Pink;Moist;Epithelialization 06/16/22 1400   Tissue loss description Partial thickness 06/16/22 1400   Red (%), Wound Tissue Color 100 % 06/16/22 1400   Periwound Area Intact;Dry;Satellite lesion;Hemosiderin Staining 06/16/22 1400   Wound Edges Irregular 06/16/22 1400   Wound Length (cm) 0.5 cm 06/16/22 1400   Wound Width (cm) 0.5 cm 06/16/22 1400   Wound Depth (cm) 0 cm 06/16/22 1400   Wound Volume (cm^3) 0 cm^3 06/16/22 1400   Wound Surface Area (cm^2) 0.25 cm^2 06/16/22 1400   Care Cleansed with:;Soap and water;Sterile normal saline 06/16/22 1400   Dressing Applied;Other (comment);Compression wrap 06/16/22 1400   Periwound Care Dry periwound area maintained 06/16/22 1400   Compression Four layer compression 06/16/22 1400   Dressing Change Due 06/23/22 06/16/22 1400            Wound 05/12/22 1300 Other (comment) Right lower Leg (Active)   05/12/22 1300    Pre-existing: Yes   Primary Wound Type: Other   Side: Right   Orientation: lower   Location: Leg   Wound Number:    Ankle-Brachial Index:    Pulses:    Removal Indication and Assessment:    Wound Outcome:    (Retired) Wound Type:    (Retired) Wound Length (cm):    (Retired) Wound Width (cm):    (Retired) Depth (cm):    Wound Description (Comments):    Removal Indications:    Wound Image   06/16/22 1400   Dressing Appearance Intact;Moist drainage 06/16/22 1400   Drainage Amount Small 06/16/22 1400   Drainage Characteristics/Odor Serosanguineous 06/16/22 1400   Appearance Pink;Red;Moist 06/16/22 1400   Tissue loss description Full thickness 06/16/22 1400   Red (%), Wound Tissue Color 100 % 06/16/22 1400   Periwound Area Dry;Satellite lesion;Hemosiderin Staining 06/16/22 1400   Wound Edges Open 06/16/22 1400   Wound Length (cm) 1.2 cm 06/16/22 1400   Wound Width (cm) 1.1 cm 06/16/22 1400   Wound Depth (cm) 0.1 cm 06/16/22 1400   Wound Volume (cm^3) 0.132 cm^3 06/16/22 1400   Wound Surface Area (cm^2) 1.32 cm^2 06/16/22 1400   Care Cleansed with:;Sterile  normal saline 06/16/22 1400   Dressing Applied;Hydrofiber;Other (comment);Compression wrap 06/16/22 1400   Periwound Care Dry periwound area maintained 06/16/22 1400   Compression Four layer compression 06/16/22 1400   Dressing Change Due 06/23/22 06/16/22 1400         Assessment:         ICD-10-CM ICD-9-CM   1. Venous insufficiency of both lower extremities  I87.2 459.81   2. Venous ulcer of both lower extremities with varicose veins  I83.019 454.0    I83.029     L97.919     L97.929    3. Lymphedema of both lower extremities  I89.0 457.1   4. Type 2 diabetes mellitus with diabetic polyneuropathy, with long-term current use of insulin  E11.42 250.60    Z79.4 357.2     V58.67   5. Venous reflux  I87.2 459.81   6. Obesity, morbid (more than 100 lbs over ideal weight or BMI > 40)  E66.01 278.01         Plan:   Tissue pathology and/or culture taken:  [] Yes [x] No   Sharp debridement performed:   [] Yes [x] No   Labs ordered this visit:   [] Yes [x] No   Imaging ordered this visit:   [] Yes [x] No           Orders Placed This Encounter   Procedures    Change dressing     RLE lateral wound    Cleanse wound with: Normal Saline   Lidocaine:PRN   Primary dressing: hydrofera regular  Secondary dressing: Xeroform to any open areas   Edema control: Profore to BLE toes to knee, protect bilateral toes with small abd pad   Frequency: Weekly   Follow-up: 2 weeks with Dr. Perry Thursday 6/30/22     Home Health: [facility]: Orders as above. Change dressings Thursdays and prn.     Other Orders: Protect bilateral toes with small abd pad. [facility] has [facility] order and should call to arrange measurements.  Pt has their phone number as well.     Compression wraps changed from 2-layer to 4-layer due to some deterioration in the wounds.  Pt was seen by Dr. Drew and the following are pending: intervention for b/l reflux, lymphedema clinic referal, bariatric surgery referral.  I recommended that patient obtain the CircAides  at this time and pursue lymphedema clinic.  Follow up in 2 weeks (on 6/30/2022) for Dr Perry.  All questions were answered.

## 2022-06-17 ENCOUNTER — EXTERNAL HOME HEALTH (OUTPATIENT)
Dept: HOME HEALTH SERVICES | Facility: HOSPITAL | Age: 77
End: 2022-06-17
Payer: MEDICARE

## 2022-06-23 ENCOUNTER — HOSPITAL ENCOUNTER (OUTPATIENT)
Facility: HOSPITAL | Age: 77
Discharge: HOME | End: 2022-06-23
Attending: INTERNAL MEDICINE | Admitting: INTERNAL MEDICINE
Payer: MEDICARE

## 2022-06-23 VITALS
OXYGEN SATURATION: 100 % | WEIGHT: 250 LBS | BODY MASS INDEX: 39.24 KG/M2 | DIASTOLIC BLOOD PRESSURE: 87 MMHG | HEART RATE: 62 BPM | TEMPERATURE: 97 F | SYSTOLIC BLOOD PRESSURE: 195 MMHG | HEIGHT: 67 IN | RESPIRATION RATE: 14 BRPM

## 2022-06-23 DIAGNOSIS — I83.019 VENOUS ULCER OF BOTH LOWER EXTREMITIES WITH VARICOSE VEINS: ICD-10-CM

## 2022-06-23 DIAGNOSIS — R60.9 EDEMA, UNSPECIFIED TYPE: ICD-10-CM

## 2022-06-23 DIAGNOSIS — I83.029 VENOUS ULCER OF BOTH LOWER EXTREMITIES WITH VARICOSE VEINS: ICD-10-CM

## 2022-06-23 DIAGNOSIS — L97.919 VENOUS ULCER OF BOTH LOWER EXTREMITIES WITH VARICOSE VEINS: ICD-10-CM

## 2022-06-23 DIAGNOSIS — I87.2 VENOUS INSUFFICIENCY OF BOTH LOWER EXTREMITIES: ICD-10-CM

## 2022-06-23 DIAGNOSIS — L97.929 VENOUS ULCER OF BOTH LOWER EXTREMITIES WITH VARICOSE VEINS: ICD-10-CM

## 2022-06-23 LAB
CTP QC/QA: YES
SARS-COV-2 AG RESP QL IA.RAPID: NEGATIVE

## 2022-06-23 PROCEDURE — C1894 INTRO/SHEATH, NON-LASER: HCPCS | Performed by: INTERNAL MEDICINE

## 2022-06-23 PROCEDURE — 25000003 PHARM REV CODE 250: Performed by: INTERNAL MEDICINE

## 2022-06-23 PROCEDURE — 36482 PR ENDOVENOUS ABLATION THER CHEM ADHESIVE, 1ST VEIN: ICD-10-PCS | Mod: LT,,, | Performed by: INTERNAL MEDICINE

## 2022-06-23 PROCEDURE — 36482 ENDOVEN THER CHEM ADHES 1ST: CPT | Mod: LT | Performed by: INTERNAL MEDICINE

## 2022-06-23 PROCEDURE — 36470 PR INJECTION THERAPY VEIN,ONE VEIN: ICD-10-PCS | Mod: 51,LT,, | Performed by: INTERNAL MEDICINE

## 2022-06-23 PROCEDURE — 36482 ENDOVEN THER CHEM ADHES 1ST: CPT | Mod: LT,,, | Performed by: INTERNAL MEDICINE

## 2022-06-23 PROCEDURE — 36470 NJX SCLRSNT 1 INCMPTNT VEIN: CPT | Mod: 51,LT,, | Performed by: INTERNAL MEDICINE

## 2022-06-23 PROCEDURE — 36470 NJX SCLRSNT 1 INCMPTNT VEIN: CPT | Mod: LT | Performed by: INTERNAL MEDICINE

## 2022-06-23 PROCEDURE — 27201423 OPTIME MED/SURG SUP & DEVICES STERILE SUPPLY: Performed by: INTERNAL MEDICINE

## 2022-06-23 RX ORDER — LIDOCAINE HYDROCHLORIDE 10 MG/ML
INJECTION INFILTRATION; PERINEURAL
Status: DISCONTINUED | OUTPATIENT
Start: 2022-06-23 | End: 2022-06-23 | Stop reason: HOSPADM

## 2022-06-23 NOTE — DISCHARGE INSTRUCTIONS
Recovering at home  Once at home, follow all the instructions youve been given. Be sure to:  Take all medicines as directed  Care for the catheter insertion site as directed  Check for signs of infection at the catheter insertion site: check for warmth, redness, yellow/green discharge from access site  Wear elastic stockings or bandages as directed: first 2 days  Keep your legs raised (elevated) as directed  Walk a few times a day. Walk for at least 5 minutes every hours, while awake.  Avoid heavy exercise, lifting, and standing for long periods as advised  Avoid air travel, hot baths, saunas, or whirlpools for the next 3 week  Avoid natural bodies of water for 3 weeks

## 2022-06-23 NOTE — Clinical Note
The catheter was inserted into the. Venaseal catheter inserted into the L GSV under ultra sound guidance.

## 2022-06-23 NOTE — BRIEF OP NOTE
S/p L GSV ablation 52 and sclerotherapy of L medial ankle veins         Full report to follow

## 2022-06-23 NOTE — INTERVAL H&P NOTE
The patient has been examined and the H&P has been reviewed:      Risks, benefits and alternatives of peripheral catheterization and possible intervention were discussed with the patient. All questions were answered and informed consent obtained.     I discussed the importance of compliance with dual antiplatelet therapy with the patient to prevent acute or late stent thrombosis with premature discontinuation of the therapy.        Active Hospital Problems    Diagnosis  POA    *Venous ulcer of both lower extremities with varicose veins [I83.019, I83.029, L97.919, L97.929]  Yes    Venous ulcer, limited to breakdown of skin [I83.009, L97.901]  Yes    Venous reflux [I87.2]  Yes    Venous insufficiency of both lower extremities [I87.2]  Yes    Ulcer of great toe, left, limited to breakdown of skin [L97.521]  Yes    Type 2 diabetes mellitus with hypoglycemia without coma, with long-term current use of insulin [E11.649, Z79.4]  Not Applicable    Osteopenia [M85.80]  Yes    Type 2 diabetes mellitus with diabetic polyneuropathy, with long-term current use of insulin [E11.42, Z79.4]  Not Applicable     Chronic      Resolved Hospital Problems   No resolved problems to display.

## 2022-06-23 NOTE — NURSING
Pt discharged per MD order. Vitals stable. Pt ambulated in unit with no problems and assisted in dressing.  Left lower leg site WNL, no swelling, bleeding, oozing, tenderness, or hematoma present. All discharge instructions given and pt verbalizes full understanding to all instructions and all questions answered. Pt was taken down to lobby via self wheelchair.

## 2022-06-23 NOTE — Clinical Note
The site was marked. The site was prepped with ChloraPrep. The site was clipped. The patient was draped. The patient was positioned supine. L leg

## 2022-06-23 NOTE — Clinical Note
A percutaneous stick to the L GSV vein was performed. Ultrasound guidance was used to obtain access.

## 2022-06-23 NOTE — DISCHARGE SUMMARY
"[facility] - Lab (Hospital)  Discharge Note  Short Stay    Procedure(s) (LRB):  Ablation (Bilateral)  Sclerotherapy    OUTCOME: Patient tolerated treatment/procedure well without complication and is now ready for discharge.    DISPOSITION: Home or Self Care    FINAL DIAGNOSIS:  <principal problem not specified>    FOLLOWUP: In clinic    DISCHARGE INSTRUCTIONS:  No discharge procedures on file.           S/p L GSV ablation 52 and sclerotherapy of L medial ankle veins      Current Discharge Medication List      CONTINUE these medications which have NOT CHANGED    Details   acetaminophen (TYLENOL) 325 MG tablet Take 2 tablets (650 mg total) by mouth every 6 (six) hours as needed for Pain.  Refills: 0      aspirin 81 MG Chew Take 81 mg by mouth once daily.      atorvastatin (LIPITOR) 40 MG tablet TAKE 1 TABLET EVERY EVENING  Qty: 90 tablet, Refills: 3      BD INSULIN SYRINGE ULTRA-FINE 0.5 mL 30 gauge x 1/2" Syrg USE TO INJECT EVERY NIGHT  Qty: 90 each, Refills: 3      blood glucose control, high (TRUE METRIX LEVEL 3) Soln Used to calibrate weekly  Qty: 1 each, Refills: 3      blood sugar diagnostic (TRUE METRIX GLUCOSE TEST STRIP) Strp Test twice daily  Qty: 200 strip, Refills: 3      clopidogreL (PLAVIX) 75 mg tablet Take 1 tablet (75 mg total) by mouth once daily.  Qty: 90 tablet, Refills: 3    Comments: NEW RX REQUEST FOR PATIENT DUE TO USING NEW MAIL ORDER PHARMACY-[facility]      ergocalciferol (ERGOCALCIFEROL) 50,000 unit Cap Take 1 capsule (50,000 Units total) by mouth every 7 days. Weekly - 12 weeks, then monthly  Qty: 12 capsule, Refills: 3    Associated Diagnoses: Hyperparathyroidism; Chronic kidney disease, stage 4 (severe)      insulin glargine (LANTUS U-100 INSULIN) 100 unit/mL injection Inject 10-15 Units into the skin every evening. DEPENDING ON SCALE (DISCARD EACH VIAL AFTER 28 DAYS)  Qty: 2 each, Refills: 3    Associated Diagnoses: Type 2 diabetes mellitus with diabetic polyneuropathy, with long-term " "current use of insulin      insulin syr/ndl U100 half yesenia (DROPLET INSULIN SYR HALF UNIT) 0.5 mL 30 gauge x 1/2" Syrg USE TO INJECT EVERY NIGHT  Qty: 100 Syringe, Refills: 4      lancets (TRUEPLUS LANCETS) 33 gauge Misc 1 lancet by Misc.(Non-Drug; Combo Route) route 2 (two) times a day.  Qty: 200 each, Refills: 0      NIFEdipine (PROCARDIA-XL) 60 MG (OSM) 24 hr tablet Take 1 tablet (60 mg total) by mouth once daily.  Qty: 90 tablet, Refills: 3    Comments: .                TIME SPENT ON DISCHARGE: 35 minutes  "

## 2022-06-27 ENCOUNTER — DOCUMENT SCAN (OUTPATIENT)
Dept: HOME HEALTH SERVICES | Facility: HOSPITAL | Age: 77
End: 2022-06-27
Payer: MEDICARE

## 2022-06-30 ENCOUNTER — LAB VISIT (OUTPATIENT)
Dept: LAB | Facility: HOSPITAL | Age: 77
End: 2022-06-30
Payer: MEDICARE

## 2022-06-30 ENCOUNTER — OFFICE VISIT (OUTPATIENT)
Dept: INTERNAL MEDICINE | Facility: CLINIC | Age: 77
End: 2022-06-30
Payer: MEDICARE

## 2022-06-30 VITALS
WEIGHT: 260 LBS | SYSTOLIC BLOOD PRESSURE: 162 MMHG | HEART RATE: 78 BPM | HEIGHT: 67 IN | DIASTOLIC BLOOD PRESSURE: 66 MMHG | OXYGEN SATURATION: 98 % | BODY MASS INDEX: 40.81 KG/M2

## 2022-06-30 DIAGNOSIS — E11.42 TYPE 2 DIABETES MELLITUS WITH DIABETIC POLYNEUROPATHY, WITH LONG-TERM CURRENT USE OF INSULIN: ICD-10-CM

## 2022-06-30 DIAGNOSIS — E78.5 HYPERLIPIDEMIA LDL GOAL <100: ICD-10-CM

## 2022-06-30 DIAGNOSIS — L97.929 VENOUS ULCER-LEG SYNDROME, BILATERAL: ICD-10-CM

## 2022-06-30 DIAGNOSIS — N18.4 STAGE 4 CHRONIC KIDNEY DISEASE: ICD-10-CM

## 2022-06-30 DIAGNOSIS — I10 ESSENTIAL HYPERTENSION: ICD-10-CM

## 2022-06-30 DIAGNOSIS — I83.029 VENOUS ULCER-LEG SYNDROME, BILATERAL: ICD-10-CM

## 2022-06-30 DIAGNOSIS — E21.3 HYPERPARATHYROIDISM: ICD-10-CM

## 2022-06-30 DIAGNOSIS — I83.019 VENOUS ULCER-LEG SYNDROME, BILATERAL: ICD-10-CM

## 2022-06-30 DIAGNOSIS — Z79.4 TYPE 2 DIABETES MELLITUS WITH DIABETIC POLYNEUROPATHY, WITH LONG-TERM CURRENT USE OF INSULIN: ICD-10-CM

## 2022-06-30 DIAGNOSIS — L97.919 VENOUS ULCER-LEG SYNDROME, BILATERAL: ICD-10-CM

## 2022-06-30 DIAGNOSIS — E11.42 TYPE 2 DIABETES MELLITUS WITH DIABETIC POLYNEUROPATHY, WITH LONG-TERM CURRENT USE OF INSULIN: Primary | ICD-10-CM

## 2022-06-30 DIAGNOSIS — Z79.4 TYPE 2 DIABETES MELLITUS WITH DIABETIC POLYNEUROPATHY, WITH LONG-TERM CURRENT USE OF INSULIN: Primary | ICD-10-CM

## 2022-06-30 DIAGNOSIS — E66.01 MORBID OBESITY WITH BODY MASS INDEX (BMI) GREATER THAN OR EQUAL TO 50: ICD-10-CM

## 2022-06-30 LAB
CHOLEST SERPL-MCNC: 163 MG/DL (ref 120–199)
CHOLEST/HDLC SERPL: 4.4 {RATIO} (ref 2–5)
ESTIMATED AVG GLUCOSE: 194 MG/DL (ref 68–131)
HBA1C MFR BLD: 8.4 % (ref 4–5.6)
HDLC SERPL-MCNC: 37 MG/DL (ref 40–75)
HDLC SERPL: 22.7 % (ref 20–50)
LDLC SERPL CALC-MCNC: 104.8 MG/DL (ref 63–159)
NONHDLC SERPL-MCNC: 126 MG/DL
TRIGL SERPL-MCNC: 106 MG/DL (ref 30–150)

## 2022-06-30 PROCEDURE — 99214 OFFICE O/P EST MOD 30 MIN: CPT | Mod: S$GLB,,, | Performed by: INTERNAL MEDICINE

## 2022-06-30 PROCEDURE — 1126F AMNT PAIN NOTED NONE PRSNT: CPT | Mod: CPTII,S$GLB,, | Performed by: INTERNAL MEDICINE

## 2022-06-30 PROCEDURE — 99999 PR PBB SHADOW E&M-EST. PATIENT-LVL IV: CPT | Mod: PBBFAC,,, | Performed by: INTERNAL MEDICINE

## 2022-06-30 PROCEDURE — 3288F FALL RISK ASSESSMENT DOCD: CPT | Mod: CPTII,S$GLB,, | Performed by: INTERNAL MEDICINE

## 2022-06-30 PROCEDURE — 80061 LIPID PANEL: CPT | Performed by: INTERNAL MEDICINE

## 2022-06-30 PROCEDURE — 99214 PR OFFICE/OUTPT VISIT, EST, LEVL IV, 30-39 MIN: ICD-10-PCS | Mod: S$GLB,,, | Performed by: INTERNAL MEDICINE

## 2022-06-30 PROCEDURE — 1126F PR PAIN SEVERITY QUANTIFIED, NO PAIN PRESENT: ICD-10-PCS | Mod: CPTII,S$GLB,, | Performed by: INTERNAL MEDICINE

## 2022-06-30 PROCEDURE — 99499 UNLISTED E&M SERVICE: CPT | Mod: S$GLB,,, | Performed by: INTERNAL MEDICINE

## 2022-06-30 PROCEDURE — 3078F DIAST BP <80 MM HG: CPT | Mod: CPTII,S$GLB,, | Performed by: INTERNAL MEDICINE

## 2022-06-30 PROCEDURE — 36415 COLL VENOUS BLD VENIPUNCTURE: CPT | Performed by: INTERNAL MEDICINE

## 2022-06-30 PROCEDURE — 99499 RISK ADDL DX/OHS AUDIT: ICD-10-PCS | Mod: S$GLB,,, | Performed by: INTERNAL MEDICINE

## 2022-06-30 PROCEDURE — 3077F PR MOST RECENT SYSTOLIC BLOOD PRESSURE >= 140 MM HG: ICD-10-PCS | Mod: CPTII,S$GLB,, | Performed by: INTERNAL MEDICINE

## 2022-06-30 PROCEDURE — 3288F PR FALLS RISK ASSESSMENT DOCUMENTED: ICD-10-PCS | Mod: CPTII,S$GLB,, | Performed by: INTERNAL MEDICINE

## 2022-06-30 PROCEDURE — 99999 PR PBB SHADOW E&M-EST. PATIENT-LVL IV: ICD-10-PCS | Mod: PBBFAC,,, | Performed by: INTERNAL MEDICINE

## 2022-06-30 PROCEDURE — 3078F PR MOST RECENT DIASTOLIC BLOOD PRESSURE < 80 MM HG: ICD-10-PCS | Mod: CPTII,S$GLB,, | Performed by: INTERNAL MEDICINE

## 2022-06-30 PROCEDURE — 1101F PT FALLS ASSESS-DOCD LE1/YR: CPT | Mod: CPTII,S$GLB,, | Performed by: INTERNAL MEDICINE

## 2022-06-30 PROCEDURE — 83036 HEMOGLOBIN GLYCOSYLATED A1C: CPT | Performed by: INTERNAL MEDICINE

## 2022-06-30 PROCEDURE — 3077F SYST BP >= 140 MM HG: CPT | Mod: CPTII,S$GLB,, | Performed by: INTERNAL MEDICINE

## 2022-06-30 PROCEDURE — 1101F PR PT FALLS ASSESS DOC 0-1 FALLS W/OUT INJ PAST YR: ICD-10-PCS | Mod: CPTII,S$GLB,, | Performed by: INTERNAL MEDICINE

## 2022-06-30 RX ORDER — ATORVASTATIN CALCIUM 40 MG/1
40 TABLET, FILM COATED ORAL NIGHTLY
Qty: 90 TABLET | Refills: 3 | Status: SHIPPED | OUTPATIENT
Start: 2022-06-30 | End: 2022-12-14 | Stop reason: SDUPTHER

## 2022-06-30 NOTE — PROGRESS NOTES
INTERNAL MEDICINE ESTABLISHED PATIENT VISIT NOTE    Subjective:     Chief Complaint: Follow-up       Patient ID: Sherin Ortiz is a 77 y.o. female with TIA, venous ulcers, PAD, HTN, HLD, CKD IV, ACD, IDDM (1/2022 HgbA1C 8.4), obesity, hyperparathyroidism, osteopenia, wheelchair dependence, last seen by me 12/2021, here today for follow-up.    6/23/2022 Admitted for L GSV ablation and sclerotherapy of L medial ankle veins. Per summary: 'Ablation of left greater saphenous vein to aid with healing of her wound.  Later she will return for right greater and lesser saphenous vein ablation.  Although only superficial reflux was mention on the ultrasound report her clinical presentations also concerning for deep venous reflux disease likely due to morbid obesity.  She will ultimately benefit from weight loss.  She will be referred to bariatric surgery clinic to be evaluated for candidacy of weight loss surgery.  Referral to lymphedema clinic to assist with chronic lymphedema.'    Continuing to go to wound care MD every 2 weeks. Wound care nurse also coming to house once weekly.      Reports BG below 150 at home.     Uses motorized wheelchair outside and within home. Uses bus for transportation.    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    Benign essential hypertension 11/14/2012    Cataract     s/p phacoemulsification    Chronic kidney disease (CKD), stage III (moderate) 9/12/2013    Diabetic peripheral neuropathy associated with type 2 diabetes mellitus 11/14/2014    causing right hemiparesis    Gait disorder     Hyperlipidemia     Iritis - Both Eyes 6/10/2013    Kidney stone     Lymphedema     Morbid obesity with BMI of 40.0-44.9, adult 2/18/2015    Nephrolithiasis 4/20/2016    NS (nuclear sclerosis) 4/1/2013    Nuclear sclerosis - Both Eyes 4/29/2013    Preseptal cellulitis - Right Eye 4/29/2013    Proliferative diabetic  "retinopathy - Both Eyes 4/29/2013    Proliferative diabetic retinopathy, both eyes 4/1/2013    PSC (posterior subcapsular cataract) - Both Eyes 4/29/2013    S/P hernia repair 12/19/2012    TIA (transient ischemic attack) 11/18/2014    Tinea pedis 7/24/2012    Tinea pedis is present on both feet.     Type 2 diabetes mellitus with diabetic polyneuropathy, with long-term current use of insulin 9/18/2015    Type 2 diabetes mellitus with renal manifestations, controlled 12/12/2013    Type 2 diabetes, controlled, with moderate nonproliferative diabetic retinopathy without macular edema 9/17/2015    Ulcer of left lower extremity, limited to breakdown of skin 7/8/2015    Unspecified cerebral artery occlusion with cerebral infarction 11/16/2014    Unspecified venous (peripheral) insufficiency     Ureteral stone with hydronephrosis 1/27/2016    UTI (lower urinary tract infection)     Vaginal infection     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          Review of Systems:  Review of Systems   Constitutional: Negative for chills and fever.   HENT: Negative for congestion.    Respiratory: Negative for cough and shortness of breath.    Cardiovascular: Negative for chest pain.   Gastrointestinal: Negative for constipation, nausea and vomiting.   Genitourinary: Negative for hematuria and urgency.   Musculoskeletal: Negative for falls.   Skin: Negative for rash.   Neurological: Negative for dizziness and loss of consciousness.       Health Maintenance:   Immunizations:   Influenza - complete  Tdap - 12/2016  Covid 19 - discussed, advised to obtain  HPV  Prevnar rec at 63 - Prevnar 12/2017, Pneumovax 12/2008  Shingrix rec at 48 - next visit     Cancer Screening:  PAP: no hx of abnl pap  Mammogram:  defer  Colonoscopy:  Next visit  DEXA:  Next visit    Objective:   BP (!) 162/66 (BP Location: Left arm, Patient Position: Sitting)   Pulse 78   Ht 5' 7" (1.702 m)   Wt 117.9 kg (260 lb)   LMP  (LMP Unknown) Comment: " patient refused to weigh/wounds to legs and toes   SpO2 98%   BMI 40.72 kg/m²        GEN - A+OX4, NAD, obese, sitting in motorized wheelchair  HEENT - PERRL, EOMI,   Neck - No thyromegaly or thyroid masses felt.  No cervical lymphadenopathy appreciated.  CV - RRR, no m/r/g  Chest - CTAB, no wheezing, crackles, or rhonchi  Abd - S/NT/ND/+BS.   Ext - 2+BDP. BLE with compression wraps.   Skin - Normal color and texture, no rash, no skin lesions.    Labs:       Assessment/Plan     Type 2 diabetes mellitus with diabetic polyneuropathy, with long-term current use of insulin  1/2022 HgbA1C 8.4; improved control would likely facilitate venous ulcer healing   Repeat HgbA1C, modify regimen accordingly   -     Diabetic Eye Screening Photo; Future    Venous ulcer-leg syndrome, bilateral  Follows with Wound Care    Hyperlipidemia LDL goal <100  Controlled, continue regimen  -     atorvastatin (LIPITOR) 40 MG tablet; Take 1 tablet (40 mg total) by mouth every evening.  Dispense: 90 tablet; Refill: 3  -     Lipid Panel; Future; Expected date: 06/30/2022    Stage 4 chronic kidney disease  Crt 1.8; avoid nephrotoxins  Follows with Nephrology    Hyperparathyroidism  Likely secondary to renal disease  Follows with Nephrology    Essential hypertension  Elevated today, multiple elevations in clinic previously  Normotension at home per prior readings provided; continue current regimen    Morbid obesity with body mass index (BMI) greater than or equal to 50  Referred to Bariatrics      as above    RTC in 3 mo, sooner if needed.    Paulina Calderon MD  Department of Internal Medicine - [facility]  08/20/2022

## 2022-07-05 ENCOUNTER — PATIENT MESSAGE (OUTPATIENT)
Dept: NEPHROLOGY | Facility: CLINIC | Age: 77
End: 2022-07-05
Payer: MEDICARE

## 2022-07-05 ENCOUNTER — PATIENT MESSAGE (OUTPATIENT)
Dept: UROLOGY | Facility: CLINIC | Age: 77
End: 2022-07-05
Payer: MEDICARE

## 2022-07-05 ENCOUNTER — PATIENT MESSAGE (OUTPATIENT)
Dept: INTERNAL MEDICINE | Facility: CLINIC | Age: 77
End: 2022-07-05
Payer: MEDICARE

## 2022-07-05 ENCOUNTER — TELEPHONE (OUTPATIENT)
Dept: NEPHROLOGY | Facility: CLINIC | Age: 77
End: 2022-07-05
Payer: MEDICARE

## 2022-07-05 DIAGNOSIS — N39.0 RECURRENT UTI: Primary | ICD-10-CM

## 2022-07-05 RX ORDER — DOXYCYCLINE 100 MG/1
100 CAPSULE ORAL EVERY 12 HOURS
Qty: 14 CAPSULE | Refills: 0 | Status: ON HOLD | OUTPATIENT
Start: 2022-07-05 | End: 2022-07-18 | Stop reason: HOSPADM

## 2022-07-05 NOTE — TELEPHONE ENCOUNTER
Pt c/o UTI symptoms.  Has been on cephalosporin in the last 3 mos.  Last urine culture was resistant to cipro.  Will order doxycycline. Unable to provide urine culture until 7/7. Will obtain then to make sure UTI is treated adequately.  Encouraged f/u c urology.

## 2022-07-05 NOTE — TELEPHONE ENCOUNTER
----- Message from Jody Murray NP sent at 7/5/2022  4:03 PM CDT -----  Pls schedule for urine culture ASAP

## 2022-07-06 DIAGNOSIS — N39.0 UTI (URINARY TRACT INFECTION), UNCOMPLICATED: Primary | ICD-10-CM

## 2022-07-07 ENCOUNTER — TELEPHONE (OUTPATIENT)
Dept: WOUND CARE | Facility: HOSPITAL | Age: 77
End: 2022-07-07
Payer: MEDICARE

## 2022-07-07 ENCOUNTER — LAB VISIT (OUTPATIENT)
Dept: LAB | Facility: HOSPITAL | Age: 77
End: 2022-07-07
Attending: NURSE PRACTITIONER
Payer: MEDICARE

## 2022-07-07 DIAGNOSIS — N18.4 CHRONIC KIDNEY DISEASE, STAGE IV (SEVERE): Primary | ICD-10-CM

## 2022-07-07 DIAGNOSIS — N39.0 URINARY TRACT INFECTION, SITE NOT SPECIFIED: ICD-10-CM

## 2022-07-07 PROCEDURE — 87186 SC STD MICRODIL/AGAR DIL: CPT | Performed by: NURSE PRACTITIONER

## 2022-07-07 PROCEDURE — 87077 CULTURE AEROBIC IDENTIFY: CPT | Performed by: NURSE PRACTITIONER

## 2022-07-07 PROCEDURE — 87088 URINE BACTERIA CULTURE: CPT | Performed by: NURSE PRACTITIONER

## 2022-07-07 PROCEDURE — 87086 URINE CULTURE/COLONY COUNT: CPT | Performed by: NURSE PRACTITIONER

## 2022-07-07 NOTE — TELEPHONE ENCOUNTER
Patient's home health nurse called to report patient seen today due to MD out of office today and supplies have not come in yet for patient. Coflex with calamine applied to BLE per home health. Follow up 7/14/22 in wound care with .

## 2022-07-09 NOTE — PROGRESS NOTES
Subjective:      Patient ID: Sherin Ortiz is a 77 y.o. female.    Chief Complaint: Nail Care and Nail Problem    Sherin is a 77 y.o. female who presents to the clinic for evaluation and treatment of high risk feet. 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/2013), Benign essential hypertension (11/14/2012), Cataract, Chronic kidney disease (CKD), stage III (moderate) (9/12/2013), Diabetic peripheral neuropathy associated with type 2 diabetes mellitus (11/14/2014), Gait disorder, Hyperlipidemia, Iritis - Both Eyes (6/10/2013), Kidney stone, Lymphedema, Morbid obesity with BMI of 40.0-44.9, adult (2/18/2015), Nephrolithiasis (4/20/2016), NS (nuclear sclerosis) (4/1/2013), Nuclear sclerosis - Both Eyes (4/29/2013), Preseptal cellulitis - Right Eye (4/29/2013), Proliferative diabetic retinopathy - Both Eyes (4/29/2013), Proliferative diabetic retinopathy, both eyes (4/1/2013), PSC (posterior subcapsular cataract) - Both Eyes (4/29/2013), S/P hernia repair (12/19/2012), TIA (transient ischemic attack) (11/18/2014), Tinea pedis (7/24/2012), Type 2 diabetes mellitus with diabetic polyneuropathy, with long-term current use of insulin (9/18/2015), Type 2 diabetes mellitus with renal manifestations, controlled (12/12/2013), Type 2 diabetes, controlled, with moderate nonproliferative diabetic retinopathy without macular edema (9/17/2015), Ulcer of left lower extremity, limited to breakdown of skin (7/8/2015), Unspecified cerebral artery occlusion with cerebral infarction (11/16/2014), Unspecified venous (peripheral) insufficiency, Ureteral stone with hydronephrosis (1/27/2016), UTI (lower urinary tract infection), Vaginal infection, and 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 The patient's chief complaint is long, thick toenails. This patient has documented high risk feet requiring routine maintenance secondary to diabetes mellitis and those secondary complications of diabetes, as  mentioned..    PCP: Deedee Calderon MD    Date Last Seen by PCP: .   Chief Complaint   Patient presents with    Nail Care    Nail Problem   \\\  Hemoglobin A1C   Date Value Ref Range Status   06/30/2022 8.4 (H) 4.0 - 5.6 % Final     Comment:     ADA Screening Guidelines:  5.7-6.4%  Consistent with prediabetes  >or=6.5%  Consistent with diabetes    High levels of fetal hemoglobin interfere with the HbA1C  assay. Heterozygous hemoglobin variants (HbS, HgC, etc)do  not significantly interfere with this assay.   However, presence of multiple variants may affect accuracy.     01/18/2022 8.4 (H) 4.0 - 5.6 % Final     Comment:     ADA Screening Guidelines:  5.7-6.4%  Consistent with prediabetes  >or=6.5%  Consistent with diabetes    High levels of fetal hemoglobin interfere with the HbA1C  assay. Heterozygous hemoglobin variants (HbS, HgC, etc)do  not significantly interfere with this assay.   However, presence of multiple variants may affect accuracy.     12/06/2021 8.4 (H) 4.0 - 5.6 % Final     Comment:     ADA Screening Guidelines:  5.7-6.4%  Consistent with prediabetes  >or=6.5%  Consistent with diabetes    High levels of fetal hemoglobin interfere with the HbA1C  assay. Heterozygous hemoglobin variants (HbS, HgC, etc)do  not significantly interfere with this assay.   However, presence of multiple variants may affect accuracy.         Review of Systems   Constitutional: Negative for chills, decreased appetite and fever.   Cardiovascular: Negative for leg swelling.   Skin: Positive for dry skin and nail changes (deformed, thickened ). Negative for flushing and itching.   Musculoskeletal: Negative for arthritis, joint pain, joint swelling and myalgias.   Gastrointestinal: Negative for nausea and vomiting.   Neurological: Negative for loss of balance, numbness and paresthesias.           Objective:      Physical Exam  Vitals reviewed.   Constitutional:       Appearance: She is well-developed.   Cardiovascular:       Comments: Dorsalis pedis and posterior tibial pulses are diminished Bilaterally. Toes are cool to touch. Feet are warm proximally.There is decreased digital hair. Skin is atrophic, slightly hyperpigmented, and mildly edematous      Musculoskeletal:         General: Tenderness (toenails) present. Normal range of motion.      Comments: Adequate joint range of motion without pain, limitation, nor crepitation Bilateral feet and ankle joints. Muscle strength is 5/5 in all groups bilaterally.         Skin:     General: Skin is warm and dry.      Findings: No ecchymosis, erythema or lesion.      Comments: Nails x 10 are elongated by  2-12mm's, thickened by 2-9 mm's, dystrophic, and are darkened in  coloration . Xerosis Bilaterally. No open lesions noted.       Neurological:      Mental Status: She is alert and oriented to person, place, and time.      Comments: Hanna-Philip 5.07 monofilamant testing is diminished Ronny feet. Sharp/dull sensation diminished Bilaterally. Light touch absent Bilaterally.       Psychiatric:         Behavior: Behavior normal.               Assessment:       Encounter Diagnoses   Name Primary?    Avulsed toenail, initial encounter     Toenail deformity          Plan:       Sherin was seen today for nail care and nail problem.    Diagnoses and all orders for this visit:    Avulsed toenail, initial encounter  -     Ambulatory referral/consult to Podiatry    Toenail deformity  -     Ambulatory referral/consult to Podiatry      I counseled the patient on her conditions, their implications and medical management.        - Shoe inspection. Diabetic Foot Education. Patient reminded of the importance of good nutrition and blood sugar control to help prevent podiatric complications of diabetes. Patient instructed on proper foot hygeine. We discussed wearing proper shoe gear, daily foot inspections, never walking without protective shoe gear, never putting sharp instruments to feet, routine podiatric  nail visits every 6m  \  - With patient's permission, nails were aggressively reduced and debrided x 10 to their soft tissue attachment mechanically and with electric , removing all offending nail and debris. Patient relates relief following the procedure. She will continue to monitor the areas daily, inpect her feet, wear protective shoe gear when ambulatory, moisturizer to maintain skin integrity and follow in this office in approximately 6 months, sooner p.r.n.

## 2022-07-10 LAB — BACTERIA UR CULT: ABNORMAL

## 2022-07-11 ENCOUNTER — PATIENT MESSAGE (OUTPATIENT)
Dept: NEPHROLOGY | Facility: CLINIC | Age: 77
End: 2022-07-11
Payer: MEDICARE

## 2022-07-12 ENCOUNTER — HOSPITAL ENCOUNTER (INPATIENT)
Facility: HOSPITAL | Age: 77
LOS: 4 days | Discharge: HOME HEALTH | DRG: 394 | End: 2022-07-19
Attending: EMERGENCY MEDICINE | Admitting: FAMILY MEDICINE
Payer: MEDICARE

## 2022-07-12 ENCOUNTER — DOCUMENT SCAN (OUTPATIENT)
Dept: HOME HEALTH SERVICES | Facility: HOSPITAL | Age: 77
End: 2022-07-12
Payer: MEDICARE

## 2022-07-12 DIAGNOSIS — K92.1 MELENA: Primary | ICD-10-CM

## 2022-07-12 DIAGNOSIS — D64.9 ANEMIA, UNSPECIFIED TYPE: ICD-10-CM

## 2022-07-12 DIAGNOSIS — I87.2 VENOUS INSUFFICIENCY OF BOTH LOWER EXTREMITIES: ICD-10-CM

## 2022-07-12 DIAGNOSIS — R53.1 WEAKNESS: ICD-10-CM

## 2022-07-12 DIAGNOSIS — R07.9 CHEST PAIN: ICD-10-CM

## 2022-07-12 DIAGNOSIS — K92.2 GASTROINTESTINAL HEMORRHAGE, UNSPECIFIED GASTROINTESTINAL HEMORRHAGE TYPE: ICD-10-CM

## 2022-07-12 LAB
ABO + RH BLD: NORMAL
ALBUMIN SERPL BCP-MCNC: 2.6 G/DL (ref 3.5–5.2)
ALP SERPL-CCNC: 141 U/L (ref 55–135)
ALT SERPL W/O P-5'-P-CCNC: 9 U/L (ref 10–44)
ANION GAP SERPL CALC-SCNC: 10 MMOL/L (ref 8–16)
AST SERPL-CCNC: 14 U/L (ref 10–40)
BASOPHILS # BLD AUTO: 0.01 K/UL (ref 0–0.2)
BASOPHILS NFR BLD: 0.4 % (ref 0–1.9)
BILIRUB SERPL-MCNC: 0.3 MG/DL (ref 0.1–1)
BLD GP AB SCN CELLS X3 SERPL QL: NORMAL
BLD PROD TYP BPU: NORMAL
BLOOD UNIT EXPIRATION DATE: NORMAL
BLOOD UNIT TYPE CODE: 6200
BLOOD UNIT TYPE: NORMAL
BNP SERPL-MCNC: 238 PG/ML (ref 0–99)
BUN SERPL-MCNC: 61 MG/DL (ref 8–23)
CALCIUM SERPL-MCNC: 8.6 MG/DL (ref 8.7–10.5)
CHLORIDE SERPL-SCNC: 111 MMOL/L (ref 95–110)
CO2 SERPL-SCNC: 18 MMOL/L (ref 23–29)
CODING SYSTEM: NORMAL
CREAT SERPL-MCNC: 1.8 MG/DL (ref 0.5–1.4)
CTP QC/QA: YES
DIFFERENTIAL METHOD: ABNORMAL
DISPENSE STATUS: NORMAL
EOSINOPHIL # BLD AUTO: 0 K/UL (ref 0–0.5)
EOSINOPHIL NFR BLD: 0.4 % (ref 0–8)
ERYTHROCYTE [DISTWIDTH] IN BLOOD BY AUTOMATED COUNT: 16.5 % (ref 11.5–14.5)
EST. GFR  (AFRICAN AMERICAN): 30 ML/MIN/1.73 M^2
EST. GFR  (NON AFRICAN AMERICAN): 26 ML/MIN/1.73 M^2
GLUCOSE SERPL-MCNC: 247 MG/DL (ref 70–110)
HCT VFR BLD AUTO: 21.7 % (ref 37–48.5)
HGB BLD-MCNC: 6.7 G/DL (ref 12–16)
IMM GRANULOCYTES # BLD AUTO: 0.01 K/UL (ref 0–0.04)
IMM GRANULOCYTES NFR BLD AUTO: 0.4 % (ref 0–0.5)
LYMPHOCYTES # BLD AUTO: 0.7 K/UL (ref 1–4.8)
LYMPHOCYTES NFR BLD: 24.1 % (ref 18–48)
MCH RBC QN AUTO: 29.5 PG (ref 27–31)
MCHC RBC AUTO-ENTMCNC: 30.9 G/DL (ref 32–36)
MCV RBC AUTO: 96 FL (ref 82–98)
MONOCYTES # BLD AUTO: 0.2 K/UL (ref 0.3–1)
MONOCYTES NFR BLD: 5.7 % (ref 4–15)
NEUTROPHILS # BLD AUTO: 2 K/UL (ref 1.8–7.7)
NEUTROPHILS NFR BLD: 69 % (ref 38–73)
NRBC BLD-RTO: 0 /100 WBC
OB PNL STL: POSITIVE
PLATELET # BLD AUTO: 254 K/UL (ref 150–450)
PMV BLD AUTO: 9 FL (ref 9.2–12.9)
POCT GLUCOSE: 295 MG/DL (ref 70–110)
POTASSIUM SERPL-SCNC: 4.8 MMOL/L (ref 3.5–5.1)
PROT SERPL-MCNC: 6.4 G/DL (ref 6–8.4)
RBC # BLD AUTO: 2.27 M/UL (ref 4–5.4)
SARS-COV-2 RDRP RESP QL NAA+PROBE: NEGATIVE
SODIUM SERPL-SCNC: 139 MMOL/L (ref 136–145)
TRANS ERYTHROCYTES VOL PATIENT: NORMAL ML
TROPONIN I SERPL DL<=0.01 NG/ML-MCNC: 0.02 NG/ML (ref 0–0.03)
WBC # BLD AUTO: 2.82 K/UL (ref 3.9–12.7)

## 2022-07-12 PROCEDURE — P9021 RED BLOOD CELLS UNIT: HCPCS | Performed by: EMERGENCY MEDICINE

## 2022-07-12 PROCEDURE — 36430 TRANSFUSION BLD/BLD COMPNT: CPT

## 2022-07-12 PROCEDURE — 99291 CRITICAL CARE FIRST HOUR: CPT | Mod: 25

## 2022-07-12 PROCEDURE — 86900 BLOOD TYPING SEROLOGIC ABO: CPT | Performed by: EMERGENCY MEDICINE

## 2022-07-12 PROCEDURE — 82272 OCCULT BLD FECES 1-3 TESTS: CPT | Performed by: EMERGENCY MEDICINE

## 2022-07-12 PROCEDURE — 84484 ASSAY OF TROPONIN QUANT: CPT | Performed by: EMERGENCY MEDICINE

## 2022-07-12 PROCEDURE — 86920 COMPATIBILITY TEST SPIN: CPT | Performed by: EMERGENCY MEDICINE

## 2022-07-12 PROCEDURE — 80053 COMPREHEN METABOLIC PANEL: CPT | Performed by: EMERGENCY MEDICINE

## 2022-07-12 PROCEDURE — 86920 COMPATIBILITY TEST SPIN: CPT | Performed by: FAMILY MEDICINE

## 2022-07-12 PROCEDURE — U0002 COVID-19 LAB TEST NON-CDC: HCPCS | Performed by: EMERGENCY MEDICINE

## 2022-07-12 PROCEDURE — 82962 GLUCOSE BLOOD TEST: CPT

## 2022-07-12 PROCEDURE — 83880 ASSAY OF NATRIURETIC PEPTIDE: CPT | Performed by: EMERGENCY MEDICINE

## 2022-07-12 PROCEDURE — 85025 COMPLETE CBC W/AUTO DIFF WBC: CPT | Performed by: EMERGENCY MEDICINE

## 2022-07-12 PROCEDURE — 86901 BLOOD TYPING SEROLOGIC RH(D): CPT | Performed by: EMERGENCY MEDICINE

## 2022-07-12 RX ORDER — HYDROCODONE BITARTRATE AND ACETAMINOPHEN 500; 5 MG/1; MG/1
TABLET ORAL
Status: DISCONTINUED | OUTPATIENT
Start: 2022-07-13 | End: 2022-07-19 | Stop reason: HOSPADM

## 2022-07-12 RX ADMIN — SODIUM CHLORIDE: 0.9 INJECTION, SOLUTION INTRAVENOUS at 11:07

## 2022-07-13 ENCOUNTER — ANESTHESIA (OUTPATIENT)
Dept: ENDOSCOPY | Facility: HOSPITAL | Age: 77
DRG: 394 | End: 2022-07-13
Payer: MEDICARE

## 2022-07-13 ENCOUNTER — ANESTHESIA EVENT (OUTPATIENT)
Dept: ENDOSCOPY | Facility: HOSPITAL | Age: 77
DRG: 394 | End: 2022-07-13
Payer: MEDICARE

## 2022-07-13 PROBLEM — K92.2 GIB (GASTROINTESTINAL BLEEDING): Status: ACTIVE | Noted: 2022-07-13

## 2022-07-13 LAB
ALBUMIN SERPL BCP-MCNC: 2.5 G/DL (ref 3.5–5.2)
ALP SERPL-CCNC: 132 U/L (ref 55–135)
ALT SERPL W/O P-5'-P-CCNC: 9 U/L (ref 10–44)
ANION GAP SERPL CALC-SCNC: 9 MMOL/L (ref 8–16)
ANISOCYTOSIS BLD QL SMEAR: SLIGHT
ANISOCYTOSIS BLD QL SMEAR: SLIGHT
AST SERPL-CCNC: 14 U/L (ref 10–40)
BACTERIA #/AREA URNS HPF: NORMAL /HPF
BASOPHILS # BLD AUTO: 0.01 K/UL (ref 0–0.2)
BASOPHILS # BLD AUTO: 0.02 K/UL (ref 0–0.2)
BASOPHILS # BLD AUTO: 0.02 K/UL (ref 0–0.2)
BASOPHILS NFR BLD: 0.2 % (ref 0–1.9)
BASOPHILS NFR BLD: 0.4 % (ref 0–1.9)
BASOPHILS NFR BLD: 0.4 % (ref 0–1.9)
BILIRUB SERPL-MCNC: 0.5 MG/DL (ref 0.1–1)
BILIRUB UR QL STRIP: NEGATIVE
BLD PROD TYP BPU: NORMAL
BLD PROD TYP BPU: NORMAL
BLOOD UNIT EXPIRATION DATE: NORMAL
BLOOD UNIT EXPIRATION DATE: NORMAL
BLOOD UNIT TYPE CODE: 6200
BLOOD UNIT TYPE CODE: 6200
BLOOD UNIT TYPE: NORMAL
BLOOD UNIT TYPE: NORMAL
BUN SERPL-MCNC: 60 MG/DL (ref 8–23)
CALCIUM SERPL-MCNC: 8.5 MG/DL (ref 8.7–10.5)
CHLORIDE SERPL-SCNC: 113 MMOL/L (ref 95–110)
CLARITY UR: CLEAR
CO2 SERPL-SCNC: 20 MMOL/L (ref 23–29)
CODING SYSTEM: NORMAL
CODING SYSTEM: NORMAL
COLOR UR: COLORLESS
CREAT SERPL-MCNC: 1.7 MG/DL (ref 0.5–1.4)
DIFFERENTIAL METHOD: ABNORMAL
DISPENSE STATUS: NORMAL
DISPENSE STATUS: NORMAL
EOSINOPHIL # BLD AUTO: 0 K/UL (ref 0–0.5)
EOSINOPHIL # BLD AUTO: 0.1 K/UL (ref 0–0.5)
EOSINOPHIL # BLD AUTO: 0.1 K/UL (ref 0–0.5)
EOSINOPHIL NFR BLD: 0.8 % (ref 0–8)
EOSINOPHIL NFR BLD: 1.1 % (ref 0–8)
EOSINOPHIL NFR BLD: 1.3 % (ref 0–8)
ERYTHROCYTE [DISTWIDTH] IN BLOOD BY AUTOMATED COUNT: 16.2 % (ref 11.5–14.5)
ERYTHROCYTE [DISTWIDTH] IN BLOOD BY AUTOMATED COUNT: 16.4 % (ref 11.5–14.5)
ERYTHROCYTE [DISTWIDTH] IN BLOOD BY AUTOMATED COUNT: 16.8 % (ref 11.5–14.5)
EST. GFR  (AFRICAN AMERICAN): 33 ML/MIN/1.73 M^2
EST. GFR  (NON AFRICAN AMERICAN): 28 ML/MIN/1.73 M^2
GLUCOSE SERPL-MCNC: 182 MG/DL (ref 70–110)
GLUCOSE UR QL STRIP: ABNORMAL
HCT VFR BLD AUTO: 16.9 % (ref 37–48.5)
HCT VFR BLD AUTO: 16.9 % (ref 37–48.5)
HCT VFR BLD AUTO: 17.1 % (ref 37–48.5)
HCT VFR BLD AUTO: 18.5 % (ref 37–48.5)
HGB BLD-MCNC: 5.3 G/DL (ref 12–16)
HGB BLD-MCNC: 5.3 G/DL (ref 12–16)
HGB BLD-MCNC: 5.4 G/DL (ref 12–16)
HGB BLD-MCNC: 5.9 G/DL (ref 12–16)
HGB UR QL STRIP: NEGATIVE
HYPOCHROMIA BLD QL SMEAR: ABNORMAL
HYPOCHROMIA BLD QL SMEAR: ABNORMAL
IMM GRANULOCYTES # BLD AUTO: 0.01 K/UL (ref 0–0.04)
IMM GRANULOCYTES # BLD AUTO: 0.01 K/UL (ref 0–0.04)
IMM GRANULOCYTES # BLD AUTO: 0.02 K/UL (ref 0–0.04)
IMM GRANULOCYTES NFR BLD AUTO: 0.2 % (ref 0–0.5)
IMM GRANULOCYTES NFR BLD AUTO: 0.2 % (ref 0–0.5)
IMM GRANULOCYTES NFR BLD AUTO: 0.4 % (ref 0–0.5)
KETONES UR QL STRIP: NEGATIVE
LEUKOCYTE ESTERASE UR QL STRIP: ABNORMAL
LYMPHOCYTES # BLD AUTO: 1 K/UL (ref 1–4.8)
LYMPHOCYTES # BLD AUTO: 1.1 K/UL (ref 1–4.8)
LYMPHOCYTES # BLD AUTO: 1.2 K/UL (ref 1–4.8)
LYMPHOCYTES NFR BLD: 20.5 % (ref 18–48)
LYMPHOCYTES NFR BLD: 22.3 % (ref 18–48)
LYMPHOCYTES NFR BLD: 25.3 % (ref 18–48)
MCH RBC QN AUTO: 29 PG (ref 27–31)
MCH RBC QN AUTO: 29.2 PG (ref 27–31)
MCH RBC QN AUTO: 29.4 PG (ref 27–31)
MCHC RBC AUTO-ENTMCNC: 31.4 G/DL (ref 32–36)
MCHC RBC AUTO-ENTMCNC: 31.6 G/DL (ref 32–36)
MCHC RBC AUTO-ENTMCNC: 31.9 G/DL (ref 32–36)
MCV RBC AUTO: 92 FL (ref 82–98)
MCV RBC AUTO: 92 FL (ref 82–98)
MCV RBC AUTO: 94 FL (ref 82–98)
MICROSCOPIC COMMENT: NORMAL
MONOCYTES # BLD AUTO: 0.3 K/UL (ref 0.3–1)
MONOCYTES # BLD AUTO: 0.3 K/UL (ref 0.3–1)
MONOCYTES # BLD AUTO: 0.4 K/UL (ref 0.3–1)
MONOCYTES NFR BLD: 6.9 % (ref 4–15)
MONOCYTES NFR BLD: 7 % (ref 4–15)
MONOCYTES NFR BLD: 8 % (ref 4–15)
NEUTROPHILS # BLD AUTO: 3.2 K/UL (ref 1.8–7.7)
NEUTROPHILS # BLD AUTO: 3.2 K/UL (ref 1.8–7.7)
NEUTROPHILS # BLD AUTO: 3.4 K/UL (ref 1.8–7.7)
NEUTROPHILS NFR BLD: 66.6 % (ref 38–73)
NEUTROPHILS NFR BLD: 67.8 % (ref 38–73)
NEUTROPHILS NFR BLD: 70.6 % (ref 38–73)
NITRITE UR QL STRIP: NEGATIVE
NRBC BLD-RTO: 0 /100 WBC
PH UR STRIP: 6 [PH] (ref 5–8)
PLATELET # BLD AUTO: 273 K/UL (ref 150–450)
PLATELET # BLD AUTO: 274 K/UL (ref 150–450)
PLATELET # BLD AUTO: 278 K/UL (ref 150–450)
PLATELET BLD QL SMEAR: ABNORMAL
PLATELET BLD QL SMEAR: ABNORMAL
PMV BLD AUTO: 8.9 FL (ref 9.2–12.9)
PMV BLD AUTO: 9.1 FL (ref 9.2–12.9)
PMV BLD AUTO: 9.2 FL (ref 9.2–12.9)
POCT GLUCOSE: 174 MG/DL (ref 70–110)
POCT GLUCOSE: 206 MG/DL (ref 70–110)
POLYCHROMASIA BLD QL SMEAR: ABNORMAL
POLYCHROMASIA BLD QL SMEAR: ABNORMAL
POTASSIUM SERPL-SCNC: 4.9 MMOL/L (ref 3.5–5.1)
PROT SERPL-MCNC: 5.7 G/DL (ref 6–8.4)
PROT UR QL STRIP: ABNORMAL
RBC # BLD AUTO: 1.8 M/UL (ref 4–5.4)
RBC # BLD AUTO: 1.86 M/UL (ref 4–5.4)
RBC # BLD AUTO: 2.02 M/UL (ref 4–5.4)
RBC #/AREA URNS HPF: 1 /HPF (ref 0–4)
SODIUM SERPL-SCNC: 142 MMOL/L (ref 136–145)
SP GR UR STRIP: 1.01 (ref 1–1.03)
SQUAMOUS #/AREA URNS HPF: 0 /HPF
TRANS ERYTHROCYTES VOL PATIENT: NORMAL ML
TRANS ERYTHROCYTES VOL PATIENT: NORMAL ML
URN SPEC COLLECT METH UR: ABNORMAL
UROBILINOGEN UR STRIP-ACNC: NEGATIVE EU/DL
WBC # BLD AUTO: 4.74 K/UL (ref 3.9–12.7)
WBC # BLD AUTO: 4.76 K/UL (ref 3.9–12.7)
WBC # BLD AUTO: 4.78 K/UL (ref 3.9–12.7)
WBC #/AREA URNS HPF: 1 /HPF (ref 0–5)
YEAST URNS QL MICRO: NORMAL

## 2022-07-13 PROCEDURE — 85025 COMPLETE CBC W/AUTO DIFF WBC: CPT | Performed by: NURSE PRACTITIONER

## 2022-07-13 PROCEDURE — 43235 EGD DIAGNOSTIC BRUSH WASH: CPT | Performed by: INTERNAL MEDICINE

## 2022-07-13 PROCEDURE — 81000 URINALYSIS NONAUTO W/SCOPE: CPT | Performed by: EMERGENCY MEDICINE

## 2022-07-13 PROCEDURE — 94760 N-INVAS EAR/PLS OXIMETRY 1: CPT

## 2022-07-13 PROCEDURE — 43235 PR EGD, FLEX, DIAGNOSTIC: ICD-10-PCS | Mod: ,,, | Performed by: INTERNAL MEDICINE

## 2022-07-13 PROCEDURE — 25000003 PHARM REV CODE 250: Performed by: EMERGENCY MEDICINE

## 2022-07-13 PROCEDURE — 96372 THER/PROPH/DIAG INJ SC/IM: CPT | Performed by: NURSE PRACTITIONER

## 2022-07-13 PROCEDURE — 43235 EGD DIAGNOSTIC BRUSH WASH: CPT | Mod: ,,, | Performed by: INTERNAL MEDICINE

## 2022-07-13 PROCEDURE — 63600175 PHARM REV CODE 636 W HCPCS: Performed by: NURSE ANESTHETIST, CERTIFIED REGISTERED

## 2022-07-13 PROCEDURE — 63600175 PHARM REV CODE 636 W HCPCS: Performed by: NURSE PRACTITIONER

## 2022-07-13 PROCEDURE — 25000003 PHARM REV CODE 250: Performed by: NURSE ANESTHETIST, CERTIFIED REGISTERED

## 2022-07-13 PROCEDURE — 25000003 PHARM REV CODE 250: Performed by: NURSE PRACTITIONER

## 2022-07-13 PROCEDURE — C9113 INJ PANTOPRAZOLE SODIUM, VIA: HCPCS | Performed by: NURSE PRACTITIONER

## 2022-07-13 PROCEDURE — 37000008 HC ANESTHESIA 1ST 15 MINUTES: Performed by: INTERNAL MEDICINE

## 2022-07-13 PROCEDURE — 37000009 HC ANESTHESIA EA ADD 15 MINS: Performed by: INTERNAL MEDICINE

## 2022-07-13 PROCEDURE — 99205 PR OFFICE/OUTPT VISIT, NEW, LEVL V, 60-74 MIN: ICD-10-PCS | Mod: 25,,, | Performed by: INTERNAL MEDICINE

## 2022-07-13 PROCEDURE — P9021 RED BLOOD CELLS UNIT: HCPCS | Performed by: FAMILY MEDICINE

## 2022-07-13 PROCEDURE — 93005 ELECTROCARDIOGRAM TRACING: CPT

## 2022-07-13 PROCEDURE — G0378 HOSPITAL OBSERVATION PER HR: HCPCS

## 2022-07-13 PROCEDURE — 93010 ELECTROCARDIOGRAM REPORT: CPT | Mod: ,,, | Performed by: INTERNAL MEDICINE

## 2022-07-13 PROCEDURE — 93010 EKG 12-LEAD: ICD-10-PCS | Mod: ,,, | Performed by: INTERNAL MEDICINE

## 2022-07-13 PROCEDURE — 85025 COMPLETE CBC W/AUTO DIFF WBC: CPT | Mod: 91 | Performed by: HOSPITALIST

## 2022-07-13 PROCEDURE — 99900035 HC TECH TIME PER 15 MIN (STAT)

## 2022-07-13 PROCEDURE — 99205 OFFICE O/P NEW HI 60 MIN: CPT | Mod: 25,,, | Performed by: INTERNAL MEDICINE

## 2022-07-13 PROCEDURE — 80053 COMPREHEN METABOLIC PANEL: CPT | Performed by: NURSE PRACTITIONER

## 2022-07-13 PROCEDURE — 25000003 PHARM REV CODE 250: Performed by: INTERNAL MEDICINE

## 2022-07-13 PROCEDURE — A4216 STERILE WATER/SALINE, 10 ML: HCPCS | Performed by: NURSE PRACTITIONER

## 2022-07-13 RX ORDER — ACETAMINOPHEN 325 MG/1
650 TABLET ORAL EVERY 8 HOURS PRN
Status: DISCONTINUED | OUTPATIENT
Start: 2022-07-13 | End: 2022-07-19 | Stop reason: HOSPADM

## 2022-07-13 RX ORDER — NALOXONE HCL 0.4 MG/ML
0.02 VIAL (ML) INJECTION
Status: DISCONTINUED | OUTPATIENT
Start: 2022-07-13 | End: 2022-07-19 | Stop reason: HOSPADM

## 2022-07-13 RX ORDER — GLUCAGON 1 MG
1 KIT INJECTION
Status: DISCONTINUED | OUTPATIENT
Start: 2022-07-13 | End: 2022-07-19 | Stop reason: HOSPADM

## 2022-07-13 RX ORDER — NIFEDIPINE 30 MG/1
60 TABLET, EXTENDED RELEASE ORAL DAILY
Status: DISCONTINUED | OUTPATIENT
Start: 2022-07-13 | End: 2022-07-19 | Stop reason: HOSPADM

## 2022-07-13 RX ORDER — PROPOFOL 10 MG/ML
VIAL (ML) INTRAVENOUS CONTINUOUS PRN
Status: DISCONTINUED | OUTPATIENT
Start: 2022-07-13 | End: 2022-07-13

## 2022-07-13 RX ORDER — IPRATROPIUM BROMIDE AND ALBUTEROL SULFATE 2.5; .5 MG/3ML; MG/3ML
3 SOLUTION RESPIRATORY (INHALATION) EVERY 4 HOURS PRN
Status: DISCONTINUED | OUTPATIENT
Start: 2022-07-13 | End: 2022-07-19 | Stop reason: HOSPADM

## 2022-07-13 RX ORDER — IBUPROFEN 200 MG
16 TABLET ORAL
Status: DISCONTINUED | OUTPATIENT
Start: 2022-07-13 | End: 2022-07-19 | Stop reason: HOSPADM

## 2022-07-13 RX ORDER — INSULIN ASPART 100 [IU]/ML
0-5 INJECTION, SOLUTION INTRAVENOUS; SUBCUTANEOUS EVERY 6 HOURS PRN
Status: DISCONTINUED | OUTPATIENT
Start: 2022-07-13 | End: 2022-07-19 | Stop reason: HOSPADM

## 2022-07-13 RX ORDER — ONDANSETRON 2 MG/ML
4 INJECTION INTRAMUSCULAR; INTRAVENOUS EVERY 8 HOURS PRN
Status: DISCONTINUED | OUTPATIENT
Start: 2022-07-13 | End: 2022-07-19 | Stop reason: HOSPADM

## 2022-07-13 RX ORDER — IBUPROFEN 200 MG
24 TABLET ORAL
Status: DISCONTINUED | OUTPATIENT
Start: 2022-07-13 | End: 2022-07-19 | Stop reason: HOSPADM

## 2022-07-13 RX ORDER — PANTOPRAZOLE SODIUM 40 MG/10ML
40 INJECTION, POWDER, LYOPHILIZED, FOR SOLUTION INTRAVENOUS 2 TIMES DAILY
Status: DISCONTINUED | OUTPATIENT
Start: 2022-07-13 | End: 2022-07-19 | Stop reason: HOSPADM

## 2022-07-13 RX ORDER — ATORVASTATIN CALCIUM 40 MG/1
40 TABLET, FILM COATED ORAL NIGHTLY
Status: DISCONTINUED | OUTPATIENT
Start: 2022-07-13 | End: 2022-07-19 | Stop reason: HOSPADM

## 2022-07-13 RX ORDER — PROPOFOL 10 MG/ML
VIAL (ML) INTRAVENOUS
Status: DISCONTINUED | OUTPATIENT
Start: 2022-07-13 | End: 2022-07-13

## 2022-07-13 RX ORDER — HYDROCODONE BITARTRATE AND ACETAMINOPHEN 500; 5 MG/1; MG/1
TABLET ORAL
Status: DISCONTINUED | OUTPATIENT
Start: 2022-07-13 | End: 2022-07-19 | Stop reason: HOSPADM

## 2022-07-13 RX ORDER — ACETAMINOPHEN 500 MG
1000 TABLET ORAL
Status: ON HOLD | COMMUNITY
End: 2022-07-18 | Stop reason: HOSPADM

## 2022-07-13 RX ORDER — SODIUM CHLORIDE 0.9 % (FLUSH) 0.9 %
10 SYRINGE (ML) INJECTION EVERY 8 HOURS
Status: DISCONTINUED | OUTPATIENT
Start: 2022-07-13 | End: 2022-07-19 | Stop reason: HOSPADM

## 2022-07-13 RX ORDER — BISACODYL 5 MG
10 TABLET, DELAYED RELEASE (ENTERIC COATED) ORAL ONCE
Status: COMPLETED | OUTPATIENT
Start: 2022-07-13 | End: 2022-07-13

## 2022-07-13 RX ORDER — TALC
6 POWDER (GRAM) TOPICAL NIGHTLY PRN
Status: DISCONTINUED | OUTPATIENT
Start: 2022-07-13 | End: 2022-07-19 | Stop reason: HOSPADM

## 2022-07-13 RX ORDER — LIDOCAINE HYDROCHLORIDE 20 MG/ML
INJECTION INTRAVENOUS
Status: DISCONTINUED | OUTPATIENT
Start: 2022-07-13 | End: 2022-07-13

## 2022-07-13 RX ORDER — SODIUM CHLORIDE 9 MG/ML
INJECTION, SOLUTION INTRAVENOUS CONTINUOUS
Status: DISCONTINUED | OUTPATIENT
Start: 2022-07-13 | End: 2022-07-19 | Stop reason: HOSPADM

## 2022-07-13 RX ORDER — POLYETHYLENE GLYCOL 3350, SODIUM SULFATE ANHYDROUS, SODIUM BICARBONATE, SODIUM CHLORIDE, POTASSIUM CHLORIDE 236; 22.74; 6.74; 5.86; 2.97 G/4L; G/4L; G/4L; G/4L; G/4L
4000 POWDER, FOR SOLUTION ORAL ONCE
Status: COMPLETED | OUTPATIENT
Start: 2022-07-13 | End: 2022-07-13

## 2022-07-13 RX ADMIN — INSULIN ASPART 2 UNITS: 100 INJECTION, SOLUTION INTRAVENOUS; SUBCUTANEOUS at 05:07

## 2022-07-13 RX ADMIN — NIFEDIPINE 60 MG: 30 TABLET, FILM COATED, EXTENDED RELEASE ORAL at 09:07

## 2022-07-13 RX ADMIN — PROPOFOL 30 MG: 10 INJECTION, EMULSION INTRAVENOUS at 03:07

## 2022-07-13 RX ADMIN — PANTOPRAZOLE SODIUM 40 MG: 40 INJECTION, POWDER, FOR SOLUTION INTRAVENOUS at 09:07

## 2022-07-13 RX ADMIN — PANTOPRAZOLE SODIUM 40 MG: 40 INJECTION, POWDER, FOR SOLUTION INTRAVENOUS at 08:07

## 2022-07-13 RX ADMIN — Medication 10 ML: at 05:07

## 2022-07-13 RX ADMIN — SODIUM CHLORIDE: 0.9 INJECTION, SOLUTION INTRAVENOUS at 02:07

## 2022-07-13 RX ADMIN — BISACODYL 10 MG: 5 TABLET, COATED ORAL at 05:07

## 2022-07-13 RX ADMIN — SODIUM CHLORIDE: 0.9 INJECTION, SOLUTION INTRAVENOUS at 05:07

## 2022-07-13 RX ADMIN — ATORVASTATIN CALCIUM 40 MG: 40 TABLET, FILM COATED ORAL at 08:07

## 2022-07-13 RX ADMIN — PROPOFOL 40 MG: 10 INJECTION, EMULSION INTRAVENOUS at 02:07

## 2022-07-13 RX ADMIN — PROPOFOL 100 MCG/KG/MIN: 10 INJECTION, EMULSION INTRAVENOUS at 02:07

## 2022-07-13 RX ADMIN — LIDOCAINE HYDROCHLORIDE 50 MG: 20 INJECTION, SOLUTION INTRAVENOUS at 02:07

## 2022-07-13 RX ADMIN — POLYETHYLENE GLYCOL 3350, SODIUM SULFATE ANHYDROUS, SODIUM BICARBONATE, SODIUM CHLORIDE, POTASSIUM CHLORIDE 4000 ML: 236; 22.74; 6.74; 5.86; 2.97 POWDER, FOR SOLUTION ORAL at 06:07

## 2022-07-13 RX ADMIN — Medication 10 ML: at 09:07

## 2022-07-13 RX ADMIN — PANTOPRAZOLE SODIUM 40 MG: 40 INJECTION, POWDER, FOR SOLUTION INTRAVENOUS at 12:07

## 2022-07-13 NOTE — HPI
"Sherin Ortiz is a 77-year-old female with a past medical history of Allergy, Asteroid hyalosis - Left Eye (4/29/2013), Benign essential hypertension (11/14/2012), Cataract, Chronic kidney disease (CKD), stage III (moderate) (9/12/2013), Diabetic peripheral neuropathy associated with type 2 diabetes mellitus (11/14/2014), Gait disorder, Hyperlipidemia, Iritis - Both Eyes (6/10/2013), Kidney stone, Lymphedema, Morbid obesity with BMI of 40.0-44.9, adult (2/18/2015), Nephrolithiasis (4/20/2016), NS (nuclear sclerosis) (4/1/2013), Nuclear sclerosis - Both Eyes (4/29/2013), Preseptal cellulitis - Right Eye (4/29/2013), Proliferative diabetic retinopathy - Both Eyes (4/29/2013), Proliferative diabetic retinopathy, both eyes (4/1/2013), PSC (posterior subcapsular cataract) - Both Eyes (4/29/2013), S/P hernia repair (12/19/2012), TIA (transient ischemic attack) (11/18/2014), Tinea pedis (7/24/2012), Type 2 diabetes mellitus with diabetic polyneuropathy, with long-term current use of insulin (9/18/2015), Type 2 diabetes mellitus with renal manifestations, controlled (12/12/2013), Type 2 diabetes, controlled, with moderate nonproliferative diabetic retinopathy without macular edema (9/17/2015), Ulcer of left lower extremity, limited to breakdown of skin (7/8/2015), Unspecified cerebral artery occlusion with cerebral infarction (11/16/2014), Unspecified venous (peripheral) insufficiency, Ureteral stone with hydronephrosis (1/27/2016), UTI (lower urinary tract infection), Vaginal infection, and 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    She presents today due to weakness and black stools. Patient reports having black stools since 07/22. She had a large bowel movement today and since then her stomach has been feeling "bloated". She reports significant weakness with this as well. She denies history of bleeding from the stomach in the past shortness of breath fevers chest pain or new pains today. Patient states that she " underwent surgery for veins on her legs on 06/30/2022. She reports taking a baby aspirin and Plavix at home. Denies SOB, CP, nausea, vomiting, or abdominal pain.    In the ED: patient was occult blood positive, Hgb 6.7, CXR without acute processes. Hemodynamically stable otherwise. Admitted to [facility] for further evaluation and GI work up.

## 2022-07-13 NOTE — ASSESSMENT & PLAN NOTE
Suspected UGIB in setting of asa and plavix    Recs:  Protonix 80mg IV bolus x 1 then gtt at 8mg/hr  Intravascular resuscitation/support with IVFs   Serial H/H's and pRBCs transfusion as indicated  Discontinue all NSAIDs and Heparin products  Please correct any coagulopathy with platelets and FFP to a goal of platelets >50K and INR <2.0  Maintain IV access with 2 large bore IVs  Npo now    egd today  Higher than avg risk given anticoag use.

## 2022-07-13 NOTE — ASSESSMENT & PLAN NOTE
Creatine stable for now. BMP reviewed- noted Estimated Creatinine Clearance: 33.6 mL/min (A) (based on SCr of 1.8 mg/dL (H)). according to latest data. Monitor UOP and serial BMP and adjust therapy as needed. Renally dose meds.

## 2022-07-13 NOTE — SUBJECTIVE & OBJECTIVE
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    Benign essential hypertension 11/14/2012    Cataract     s/p phacoemulsification    Chronic kidney disease (CKD), stage III (moderate) 9/12/2013    Diabetic peripheral neuropathy associated with type 2 diabetes mellitus 11/14/2014    causing right hemiparesis    Gait disorder     Hyperlipidemia     Iritis - Both Eyes 6/10/2013    Kidney stone     Lymphedema     Morbid obesity with BMI of 40.0-44.9, adult 2/18/2015    Nephrolithiasis 4/20/2016    NS (nuclear sclerosis) 4/1/2013    Nuclear sclerosis - Both Eyes 4/29/2013    Preseptal cellulitis - Right Eye 4/29/2013    Proliferative diabetic retinopathy - Both Eyes 4/29/2013    Proliferative diabetic retinopathy, both eyes 4/1/2013    PSC (posterior subcapsular cataract) - Both Eyes 4/29/2013    S/P hernia repair 12/19/2012    TIA (transient ischemic attack) 11/18/2014    Tinea pedis 7/24/2012    Tinea pedis is present on both feet.     Type 2 diabetes mellitus with diabetic polyneuropathy, with long-term current use of insulin 9/18/2015    Type 2 diabetes mellitus with renal manifestations, controlled 12/12/2013    Type 2 diabetes, controlled, with moderate nonproliferative diabetic retinopathy without macular edema 9/17/2015    Ulcer of left lower extremity, limited to breakdown of skin 7/8/2015    Unspecified cerebral artery occlusion with cerebral infarction 11/16/2014    Unspecified venous (peripheral) insufficiency     Ureteral stone with hydronephrosis 1/27/2016    UTI (lower urinary tract infection)     Vaginal infection     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       Past Surgical History:   Procedure Laterality Date    ABLATION Bilateral 6/23/2022    Procedure: Ablation;  Surgeon: Vahid Farfan MD;  Location: [facility] CATH LAB/EP;  Service: Cardiology;  Laterality: Bilateral;    APPENDECTOMY      CATARACT EXTRACTION W/  INTRAOCULAR LENS IMPLANT Left 5/21/2013  "   CATARACT EXTRACTION W/  INTRAOCULAR LENS IMPLANT Right 2013    CHOLECYSTECTOMY      COLONOSCOPY  2005    normal    ESOPHAGOGASTRODUODENOSCOPY  2015    hiatal hernia, Schatzki ring    EYE SURGERY Bilateral     laser surgery both eyes    NASAL SEPTUM SURGERY      SUBTOTAL COLECTOMY  2012    transverse colon, for incarcerated umbilical hernia, Dr. Kat Bower       Review of patient's allergies indicates:   Allergen Reactions    Penicillins Hives     Other reaction(s): Hives  Patient has received cefdinir, ceftriaxone, cefazolin and cefepime in the past with no documented reactions    Sulfa (sulfonamide antibiotics) Other (See Comments)     Shakes, pt states her doctor told her the shakes were possibly caused by an allergy to sulfa       No current facility-administered medications on file prior to encounter.     Current Outpatient Medications on File Prior to Encounter   Medication Sig    acetaminophen (TYLENOL) 325 MG tablet Take 2 tablets (650 mg total) by mouth every 6 (six) hours as needed for Pain.    aspirin 81 MG Chew Take 81 mg by mouth once daily.    atorvastatin (LIPITOR) 40 MG tablet Take 1 tablet (40 mg total) by mouth every evening.    clopidogreL (PLAVIX) 75 mg tablet Take 1 tablet (75 mg total) by mouth once daily.    BD INSULIN SYRINGE ULTRA-FINE 0.5 mL 30 gauge x 1/2" Syrg USE TO INJECT EVERY NIGHT    blood glucose control, high (TRUE METRIX LEVEL 3) Soln Used to calibrate weekly    blood sugar diagnostic (TRUE METRIX GLUCOSE TEST STRIP) Strp Test twice daily    [] doxycycline (VIBRAMYCIN) 100 MG Cap Take 1 capsule (100 mg total) by mouth every 12 (twelve) hours. for 7 days    ergocalciferol (ERGOCALCIFEROL) 50,000 unit Cap Take 1 capsule (50,000 Units total) by mouth every 7 days. Weekly - 12 weeks, then monthly    insulin glargine (LANTUS U-100 INSULIN) 100 unit/mL injection Inject 10-15 Units into the skin every evening. DEPENDING ON SCALE (DISCARD EACH " "VIAL AFTER 28 DAYS)    insulin syr/ndl U100 half yesenia (DROPLET INSULIN SYR HALF UNIT) 0.5 mL 30 gauge x 1/2" Syrg USE TO INJECT EVERY NIGHT    lancets (TRUEPLUS LANCETS) 33 gauge Misc 1 lancet by Misc.(Non-Drug; Combo Route) route 2 (two) times a day.    NIFEdipine (PROCARDIA-XL) 60 MG (OSM) 24 hr tablet Take 1 tablet (60 mg total) by mouth once daily.     Family History       Problem Relation (Age of Onset)    Cataracts Sister, Brother    Diabetes Sister    Heart disease Brother    Leukemia Mother          Tobacco Use    Smoking status: Former Smoker     Packs/day: 0.50     Years: 15.00     Pack years: 7.50     Types: Cigarettes     Quit date: 1982     Years since quittin.0    Smokeless tobacco: Former User    Tobacco comment: smoked one pack per week   Substance and Sexual Activity    Alcohol use: No    Drug use: No    Sexual activity: Not Currently     Review of Systems   Constitutional:  Positive for fatigue.   HENT: Negative.     Eyes: Negative.    Respiratory: Negative.     Cardiovascular: Negative.    Gastrointestinal:  Positive for blood in stool.   Endocrine: Negative.    Genitourinary: Negative.    Musculoskeletal: Negative.    Skin: Negative.    Neurological:  Positive for weakness.   Psychiatric/Behavioral: Negative.     Objective:     Vital Signs (Most Recent):  Temp: 97.5 °F (36.4 °C) (22 0005)  Pulse: 77 (22 0005)  Resp: 18 (22)  BP: (!) 155/70 (22 0005)  SpO2: 100 % (22 0005)   Vital Signs (24h Range):  Temp:  [97.5 °F (36.4 °C)-97.8 °F (36.6 °C)] 97.5 °F (36.4 °C)  Pulse:  [74-78] 77  Resp:  [18] 18  SpO2:  [97 %-100 %] 100 %  BP: (143-155)/(60-70) 155/70     Weight: 117.9 kg (260 lb)  Body mass index is 40.72 kg/m².    Physical Exam  Vitals and nursing note reviewed.   Constitutional:       General: She is not in acute distress.     Appearance: Normal appearance. She is obese. She is not ill-appearing or diaphoretic.   HENT:      Head: Normocephalic and " atraumatic.      Mouth/Throat:      Mouth: Mucous membranes are dry.   Eyes:      Extraocular Movements: Extraocular movements intact.      Pupils: Pupils are equal, round, and reactive to light.   Cardiovascular:      Rate and Rhythm: Normal rate and regular rhythm.      Pulses: Normal pulses.      Heart sounds: Normal heart sounds.   Pulmonary:      Effort: Pulmonary effort is normal. No respiratory distress.      Breath sounds: Normal breath sounds.   Abdominal:      General: Bowel sounds are normal. There is no distension.      Palpations: Abdomen is soft.      Tenderness: There is no abdominal tenderness. There is no guarding.   Musculoskeletal:         General: No swelling. Normal range of motion.      Cervical back: Normal range of motion.   Skin:     General: Skin is warm.      Capillary Refill: Capillary refill takes 2 to 3 seconds.   Neurological:      General: No focal deficit present.      Mental Status: She is alert. Mental status is at baseline.   Psychiatric:         Mood and Affect: Mood normal.         Behavior: Behavior normal.         Thought Content: Thought content normal.         Judgment: Judgment normal.         CRANIAL NERVES     CN III, IV, VI   Pupils are equal, round, and reactive to light.     Significant Labs: All pertinent labs within the past 24 hours have been reviewed.    Significant Imaging: I have reviewed all pertinent imaging results/findings within the past 24 hours.

## 2022-07-13 NOTE — ASSESSMENT & PLAN NOTE
Patient is chronically on statin.will continue for now. Monitor clinically. Last LDL was   Lab Results   Component Value Date    LDLCALC 104.8 06/30/2022

## 2022-07-13 NOTE — ADDENDUM NOTE
Addendum  created 07/13/22 1545 by Vee Mabry CRNA    Intraprocedure Event edited, Intraprocedure Meds edited, Orders acknowledged in Narrator

## 2022-07-13 NOTE — PHARMACY MED REC
"[facility]           Pharmacy  Admission Medication History     The home medication history was taken by Shanda Dang.      Medication history obtained from Medications listed below were obtained from: Patient's pharmacy    Based on information gathered for medication list, you may go to "Admission" then "Reconcile Home Medications" tabs to review and/or act upon those items.      The home medication list has been updated by the Pharmacy department.    Please read ALL comments highlighted in yellow.    Please address this information as you see fit.     Feel free to contact us if you have any questions or require assistance.            No current facility-administered medications on file prior to encounter.     Current Outpatient Medications on File Prior to Encounter   Medication Sig Dispense Refill    acetaminophen (TYLENOL) 500 MG tablet Take 1,000 mg by mouth as needed for Pain.      aspirin 81 MG Chew Take 81 mg by mouth once daily.      atorvastatin (LIPITOR) 40 MG tablet Take 1 tablet (40 mg total) by mouth every evening. 90 tablet 3    ergocalciferol (ERGOCALCIFEROL) 50,000 unit Cap Take 1 capsule (50,000 Units total) by mouth every 7 days. Weekly - 12 weeks, then monthly (Patient taking differently: Take 50,000 Units by mouth every 30 days. Weekly - 12 weeks, then monthly) 12 capsule 3    insulin glargine (LANTUS U-100 INSULIN) 100 unit/mL injection Inject 10-15 Units into the skin every evening. DEPENDING ON SCALE (DISCARD EACH VIAL AFTER 28 DAYS) 2 each 3    BD INSULIN SYRINGE ULTRA-FINE 0.5 mL 30 gauge x 1/2" Syrg USE TO INJECT EVERY NIGHT 90 each 3    blood glucose control, high (TRUE METRIX LEVEL 3) Soln Used to calibrate weekly 1 each 3    blood sugar diagnostic (TRUE METRIX GLUCOSE TEST STRIP) Strp Test twice daily 200 strip 3    clopidogreL (PLAVIX) 75 mg tablet Take 1 tablet (75 mg total) by mouth once daily. (Patient not taking: Reported on 7/13/2022) 90 tablet " "3    [] doxycycline (VIBRAMYCIN) 100 MG Cap Take 1 capsule (100 mg total) by mouth every 12 (twelve) hours. for 7 days 14 capsule 0    insulin syr/ndl U100 half yesenia (DROPLET INSULIN SYR HALF UNIT) 0.5 mL 30 gauge x 1/2" Syrg USE TO INJECT EVERY NIGHT 100 Syringe 4    lancets (TRUEPLUS LANCETS) 33 gauge Misc 1 lancet by Misc.(Non-Drug; Combo Route) route 2 (two) times a day. 200 each 0    NIFEdipine (PROCARDIA-XL) 60 MG (OSM) 24 hr tablet Take 1 tablet (60 mg total) by mouth once daily. (Patient not taking: Reported on 2022) 90 tablet 3    [DISCONTINUED] acetaminophen (TYLENOL) 325 MG tablet Take 2 tablets (650 mg total) by mouth every 6 (six) hours as needed for Pain.  0       Please address this information as you see fit.  Feel free to contact us if you have any questions or require assistance.    Shanda Dang  [phone]                  .          "

## 2022-07-13 NOTE — TRANSFER OF CARE
"Anesthesia Transfer of Care Note    Patient: Sherin Ortiz    Procedure(s) Performed: Procedure(s) (LRB):  EGD (ESOPHAGOGASTRODUODENOSCOPY) (N/A)    Patient location: GI    Anesthesia Type: MAC    Transport from OR: Transported from OR on room air with adequate spontaneous ventilation    Post pain: adequate analgesia    Post assessment: no apparent anesthetic complications and tolerated procedure well    Post vital signs: stable    Level of consciousness: awake, alert and oriented    Nausea/Vomiting: no nausea/vomiting    Complications: none    Transfer of care protocol was followed      Last vitals:   Visit Vitals  BP (!) 144/65 (BP Location: Left arm, Patient Position: Lying)   Pulse 73   Temp 36.8 °C (98.2 °F) (Temporal)   Resp (!) 22   Ht 5' 7" (1.702 m)   Wt 117.9 kg (260 lb)   LMP  (LMP Unknown)   SpO2 100%   BMI 40.72 kg/m²     "

## 2022-07-13 NOTE — HPI
This is a 77-year-old lady with a history of multiple medical problems who presented to the emergency room with ongoing weakness and black stools.  GI is consulted for melena.  Patient states she has been having black stools for a few days close to a week.  She normally does not see black in the stool.  She has some upper abdominal distention and bloating as well as some nausea.  No vomiting.  She does report some increasing fatigue and weakness.  There is no history of prior bleeding or prior black stool per her knowledge.  She does take a baby aspirin and Plavix at home.  She recently had a surgery for veins on her leg about 2 weeks ago.  She denies any new medications because of this.    Last egd 2014 schatzki ring and gastritis

## 2022-07-13 NOTE — ED NOTES
PRBC's infusing without difficulty. Pt lying on stretcher with eyes closed resp even unlabored. SR up Bed locked and low CB in reach. Pt on portable monitor with BP and O2sats, and pulse.

## 2022-07-13 NOTE — SUBJECTIVE & OBJECTIVE
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    Benign essential hypertension 11/14/2012    Cataract     s/p phacoemulsification    Chronic kidney disease (CKD), stage III (moderate) 9/12/2013    Diabetic peripheral neuropathy associated with type 2 diabetes mellitus 11/14/2014    causing right hemiparesis    Gait disorder     Hyperlipidemia     Iritis - Both Eyes 6/10/2013    Kidney stone     Lymphedema     Morbid obesity with BMI of 40.0-44.9, adult 2/18/2015    Nephrolithiasis 4/20/2016    NS (nuclear sclerosis) 4/1/2013    Nuclear sclerosis - Both Eyes 4/29/2013    Preseptal cellulitis - Right Eye 4/29/2013    Proliferative diabetic retinopathy - Both Eyes 4/29/2013    Proliferative diabetic retinopathy, both eyes 4/1/2013    PSC (posterior subcapsular cataract) - Both Eyes 4/29/2013    S/P hernia repair 12/19/2012    TIA (transient ischemic attack) 11/18/2014    Tinea pedis 7/24/2012    Tinea pedis is present on both feet.     Type 2 diabetes mellitus with diabetic polyneuropathy, with long-term current use of insulin 9/18/2015    Type 2 diabetes mellitus with renal manifestations, controlled 12/12/2013    Type 2 diabetes, controlled, with moderate nonproliferative diabetic retinopathy without macular edema 9/17/2015    Ulcer of left lower extremity, limited to breakdown of skin 7/8/2015    Unspecified cerebral artery occlusion with cerebral infarction 11/16/2014    Unspecified venous (peripheral) insufficiency     Ureteral stone with hydronephrosis 1/27/2016    UTI (lower urinary tract infection)     Vaginal infection     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       Past Surgical History:   Procedure Laterality Date    ABLATION Bilateral 6/23/2022    Procedure: Ablation;  Surgeon: Vahid Farfan MD;  Location: [facility]ATH [facility]/EP;  Service: Cardiology;  Laterality: Bilateral;    APPENDECTOMY      CATARACT EXTRACTION W/  INTRAOCULAR LENS IMPLANT Left 5/21/2013     CATARACT EXTRACTION W/  INTRAOCULAR LENS IMPLANT Right 2013    CHOLECYSTECTOMY      COLONOSCOPY  2005    normal    ESOPHAGOGASTRODUODENOSCOPY  2015    hiatal hernia, Schatzki ring    EYE SURGERY Bilateral 2008    laser surgery both eyes    NASAL SEPTUM SURGERY      SUBTOTAL COLECTOMY  2012    transverse colon, for incarcerated umbilical hernia, Dr. Kat Bower       Review of patient's allergies indicates:   Allergen Reactions    Penicillins Hives     Other reaction(s): Hives  Patient has received cefdinir, ceftriaxone, cefazolin and cefepime in the past with no documented reactions    Sulfa (sulfonamide antibiotics) Other (See Comments)     Shakes, pt states her doctor told her the shakes were possibly caused by an allergy to sulfa     Family History       Problem Relation (Age of Onset)    Cataracts Sister, Brother    Diabetes Sister    Heart disease Brother    Leukemia Mother          Tobacco Use    Smoking status: Former Smoker     Packs/day: 0.50     Years: 15.00     Pack years: 7.50     Types: Cigarettes     Quit date: 1982     Years since quittin.0    Smokeless tobacco: Former User    Tobacco comment: smoked one pack per week   Substance and Sexual Activity    Alcohol use: No    Drug use: No    Sexual activity: Not Currently     Review of Systems   Constitutional:  Positive for fatigue. Negative for chills and fever.   HENT:  Negative for mouth sores and nosebleeds.    Eyes:  Negative for pain and redness.   Respiratory:  Negative for cough and shortness of breath.    Cardiovascular:  Negative for chest pain and palpitations.   Gastrointestinal:  Positive for abdominal distention and blood in stool.   Genitourinary:  Negative for dysuria and hematuria.   Musculoskeletal:  Negative for arthralgias and joint swelling.   Skin:  Positive for color change and pallor.   Neurological:  Negative for seizures and facial asymmetry.   Psychiatric/Behavioral:  Negative for  agitation and confusion.    Objective:     Vital Signs (Most Recent):  Temp: 98 °F (36.7 °C) (07/13/22 1403)  Pulse: 72 (07/13/22 1403)  Resp: 20 (07/13/22 1403)  BP: (!) 157/64 (07/13/22 1403)  SpO2: 100 % (07/13/22 1403)   Vital Signs (24h Range):  Temp:  [97.5 °F (36.4 °C)-98.7 °F (37.1 °C)] 98 °F (36.7 °C)  Pulse:  [68-78] 72  Resp:  [18-29] 20  SpO2:  [97 %-100 %] 100 %  BP: (140-171)/(58-71) 157/64     Weight: 117.9 kg (260 lb) (07/12/22 1954)  Body mass index is 40.72 kg/m².      Intake/Output Summary (Last 24 hours) at 7/13/2022 1437  Last data filed at 7/13/2022 1346  Gross per 24 hour   Intake 1381 ml   Output 1100 ml   Net 281 ml       Lines/Drains/Airways       Peripheral Intravenous Line  Duration                  Peripheral IV - Single Lumen 07/12/22 2051 20 G Right Forearm <1 day         Peripheral IV - Single Lumen 07/13/22 1005 22 G Left Wrist <1 day                    Physical Exam  Vitals reviewed.   Constitutional:       General: She is not in acute distress.     Appearance: She is not diaphoretic.   HENT:      Head: Normocephalic and atraumatic.   Eyes:      General: No scleral icterus.     Conjunctiva/sclera: Conjunctivae normal.   Cardiovascular:      Rate and Rhythm: Normal rate.      Heart sounds: Normal heart sounds.   Pulmonary:      Effort: Pulmonary effort is normal. No respiratory distress.      Breath sounds: Normal breath sounds. No stridor.   Abdominal:      General: There is no distension.      Palpations: Abdomen is soft. There is no mass.      Tenderness: There is no abdominal tenderness. There is no guarding or rebound.   Musculoskeletal:         General: No tenderness or deformity.      Cervical back: Neck supple.   Lymphadenopathy:      Cervical: No cervical adenopathy.   Skin:     General: Skin is warm and dry.      Coloration: Skin is pale.      Findings: No rash.   Neurological:      Mental Status: She is alert and oriented to person, place, and time.      Gait: Gait normal.    Psychiatric:         Mood and Affect: Mood normal.         Behavior: Behavior normal.       Significant Labs:  CBC:   Recent Labs   Lab 07/13/22  0554 07/13/22  0809 07/13/22  1241   WBC 4.78 4.76 4.74   HGB 5.3*  5.3* 5.4* 5.9*   HCT 16.9*  16.9* 17.1* 18.5*    274 278     BMP:   Recent Labs   Lab 07/13/22  0509   *      K 4.9   *   CO2 20*   BUN 60*   CREATININE 1.7*   CALCIUM 8.5*     CMP:   Recent Labs   Lab 07/13/22  0509   *   CALCIUM 8.5*   ALBUMIN 2.5*   PROT 5.7*      K 4.9   CO2 20*   *   BUN 60*   CREATININE 1.7*   ALKPHOS 132   ALT 9*   AST 14   BILITOT 0.5       Significant Imaging:  Imaging results within the past 24 hours have been reviewed.

## 2022-07-13 NOTE — ANESTHESIA POSTPROCEDURE EVALUATION
Anesthesia Post Evaluation    Patient: Sherin Ortiz    Procedure(s) Performed: Procedure(s) (LRB):  EGD (ESOPHAGOGASTRODUODENOSCOPY) (N/A)    Final Anesthesia Type: MAC      Patient location during evaluation: GI PACU  Patient participation: Yes- Able to Participate  Level of consciousness: awake and alert and oriented  Post-procedure vital signs: reviewed and stable  Pain management: adequate  Airway patency: patent    PONV status at discharge: No PONV  Anesthetic complications: no      Cardiovascular status: blood pressure returned to baseline and hemodynamically stable  Respiratory status: unassisted, spontaneous ventilation and room air  Hydration status: euvolemic  Follow-up not needed.          Vitals Value Taken Time   /46 07/13/22 1523   Temp 98.1 07/13/22 1523   Pulse 68 07/13/22 1523   Resp 18 07/13/22 1523   SpO2 99 07/13/22 1523         No case tracking events are documented in the log.      Pain/Phillip Score: No data recorded

## 2022-07-13 NOTE — ASSESSMENT & PLAN NOTE
No complaints of CP or SOB  Patient is identified as having Diastolic (HFpEF) heart failure that is Chronic. CHF is currently controlled. Latest ECHO performed and demonstrates- Results for orders placed during the hospital encounter of 11/21/20    Echo Color Flow Doppler? Yes    Interpretation Summary  · Mild left atrial enlargement.  · There is left ventricular eccentric hypertrophy.  · The left ventricle is normal in size with normal systolic function. The estimated ejection fraction is 60%.  · Indeterminate diastolic function.  · Normal right ventricular size with normal right ventricular systolic function.  · The estimated PA systolic pressure is 31 mmHg.  · Normal central venous pressure (3 mmHg).  Continue CCB and monitor clinical status closely. Monitor on telemetry. Patient is off CHF pathway.  Monitor strict Is&Os and daily weights. Continue to stress to patient importance of self efficacy and  on diet for CHF. Last BNP reviewed- and noted below   Recent Labs   Lab 07/12/22 2054   *

## 2022-07-13 NOTE — ASSESSMENT & PLAN NOTE
Anemia of chronic disease  -Presented with weakness and melena, takes ASA and Plavix; Hb 6.7 from baseline 10; hemodynamically stable  -Underwent surgery for veins in legs on 6/30/22  -CXR: No evidence of acute chest disease since 2021.  -Occult blood positive  -GI bleed Pathway initiated  -2 LBIV  -Orthostatics ordered  -Initiated on protonix 40mg IV bid  -Hold anticoagulation  -Trend CBC tid for now  -Transfuse I unit PRBCs and for Hb <7  -NPO  -GI consulted

## 2022-07-13 NOTE — ED NOTES
EKG completed and given to Dr. Freeman for review. Also notified MD of elevated glucose of 295. Awaiting further orders.

## 2022-07-13 NOTE — ED NOTES
Recd call from lab with H&H results of 5.3/17.2. Notified Lauren Seals NP and ordered redraw for H&H.

## 2022-07-13 NOTE — CONSULTS
[facility] ([facility])  Gastroenterology  Consult Note    Patient Name: Sherin Ortiz  MRN: [id number]  Admission Date: 7/12/2022  Hospital Length of Stay: 0 days  Code Status: Prior   Attending Provider: Su Maddox*   Consulting Provider: Kannan Mace MD  Primary Care Physician: Deedee Calderon MD  Principal Problem:GIB (gastrointestinal bleeding)    Inpatient consult to Gastroenterology  Consult performed by: Kannan Mace MD  Consult ordered by: Lauren Seals NP        Subjective:     HPI:  This is a 77-year-old lady with a history of multiple medical problems who presented to the emergency room with ongoing weakness and black stools.  GI is consulted for melena.  Patient states she has been having black stools for a few days close to a week.  She normally does not see black in the stool.  She has some upper abdominal distention and bloating as well as some nausea.  No vomiting.  She does report some increasing fatigue and weakness.  There is no history of prior bleeding or prior black stool per her knowledge.  She does take a baby aspirin and Plavix at home.  She recently had a surgery for veins on her leg about 2 weeks ago.  She denies any new medications because of this.    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    Benign essential hypertension 11/14/2012    Cataract     s/p phacoemulsification    Chronic kidney disease (CKD), stage III (moderate) 9/12/2013    Diabetic peripheral neuropathy associated with type 2 diabetes mellitus 11/14/2014    causing right hemiparesis    Gait disorder     Hyperlipidemia     Iritis - Both Eyes 6/10/2013    Kidney stone     Lymphedema     Morbid obesity with BMI of 40.0-44.9, adult 2/18/2015    Nephrolithiasis 4/20/2016    NS (nuclear sclerosis) 4/1/2013    Nuclear sclerosis - Both Eyes 4/29/2013    Preseptal cellulitis -  Right Eye 4/29/2013    Proliferative diabetic retinopathy - Both Eyes 4/29/2013    Proliferative diabetic retinopathy, both eyes 4/1/2013    PSC (posterior subcapsular cataract) - Both Eyes 4/29/2013    S/P hernia repair 12/19/2012    TIA (transient ischemic attack) 11/18/2014    Tinea pedis 7/24/2012    Tinea pedis is present on both feet.     Type 2 diabetes mellitus with diabetic polyneuropathy, with long-term current use of insulin 9/18/2015    Type 2 diabetes mellitus with renal manifestations, controlled 12/12/2013    Type 2 diabetes, controlled, with moderate nonproliferative diabetic retinopathy without macular edema 9/17/2015    Ulcer of left lower extremity, limited to breakdown of skin 7/8/2015    Unspecified cerebral artery occlusion with cerebral infarction 11/16/2014    Unspecified venous (peripheral) insufficiency     Ureteral stone with hydronephrosis 1/27/2016    UTI (lower urinary tract infection)     Vaginal infection     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       Past Surgical History:   Procedure Laterality Date    ABLATION Bilateral 6/23/2022    Procedure: Ablation;  Surgeon: Vahid Farfan MD;  [facility]: [facility] CATH LAB/EP;  Service: Cardiology;  Laterality: Bilateral;    APPENDECTOMY      CATARACT EXTRACTION W/  INTRAOCULAR LENS IMPLANT Left 5/21/2013    CATARACT EXTRACTION W/  INTRAOCULAR LENS IMPLANT Right 6/4/2013    CHOLECYSTECTOMY      COLONOSCOPY  12/22/2005    normal    ESOPHAGOGASTRODUODENOSCOPY  12/21/2015    hiatal hernia, Schatzki ring    EYE SURGERY Bilateral 2008    laser surgery both eyes    NASAL SEPTUM SURGERY      SUBTOTAL COLECTOMY  12/13/2012    transverse colon, for incarcerated umbilical hernia, Dr. Kat Bower       Review of patient's allergies indicates:   Allergen Reactions    Penicillins Hives     Other reaction(s): Hives  Patient has received cefdinir, ceftriaxone, cefazolin and cefepime in the past with no documented  reactions    Sulfa (sulfonamide antibiotics) Other (See Comments)     Shakes, pt states her doctor told her the shakes were possibly caused by an allergy to sulfa     Family History       Problem Relation (Age of Onset)    Cataracts Sister, Brother    Diabetes Sister    Heart disease Brother    Leukemia Mother          Tobacco Use    Smoking status: Former Smoker     Packs/day: 0.50     Years: 15.00     Pack years: 7.50     Types: Cigarettes     Quit date: 1982     Years since quittin.0    Smokeless tobacco: Former User    Tobacco comment: smoked one pack per week   Substance and Sexual Activity    Alcohol use: No    Drug use: No    Sexual activity: Not Currently     Review of Systems   Constitutional:  Positive for fatigue. Negative for chills and fever.   HENT:  Negative for mouth sores and nosebleeds.    Eyes:  Negative for pain and redness.   Respiratory:  Negative for cough and shortness of breath.    Cardiovascular:  Negative for chest pain and palpitations.   Gastrointestinal:  Positive for abdominal distention and blood in stool.   Genitourinary:  Negative for dysuria and hematuria.   Musculoskeletal:  Negative for arthralgias and joint swelling.   Skin:  Positive for color change and pallor.   Neurological:  Negative for seizures and facial asymmetry.   Psychiatric/Behavioral:  Negative for agitation and confusion.    Objective:     Vital Signs (Most Recent):  Temp: 98 °F (36.7 °C) (22 1403)  Pulse: 72 (22 1403)  Resp: 20 (22)  BP: (!) 157/64 (22)  SpO2: 100 % (22)   Vital Signs (24h Range):  Temp:  [97.5 °F (36.4 °C)-98.7 °F (37.1 °C)] 98 °F (36.7 °C)  Pulse:  [68-78] 72  Resp:  [18-29] 20  SpO2:  [97 %-100 %] 100 %  BP: (140-171)/(58-71) 157/64     Weight: 117.9 kg (260 lb) (22)  Body mass index is 40.72 kg/m².      Intake/Output Summary (Last 24 hours) at 2022 1437  Last data filed at 2022 1346  Gross per 24 hour   Intake  1381 ml   Output 1100 ml   Net 281 ml       Lines/Drains/Airways       Peripheral Intravenous Line  Duration                  Peripheral IV - Single Lumen 07/12/22 2051 20 G Right Forearm <1 day         Peripheral IV - Single Lumen 07/13/22 1005 22 G Left Wrist <1 day                    Physical Exam  Vitals reviewed.   Constitutional:       General: She is not in acute distress.     Appearance: She is not diaphoretic.   HENT:      Head: Normocephalic and atraumatic.   Eyes:      General: No scleral icterus.     Conjunctiva/sclera: Conjunctivae normal.   Cardiovascular:      Rate and Rhythm: Normal rate.      Heart sounds: Normal heart sounds.   Pulmonary:      Effort: Pulmonary effort is normal. No respiratory distress.      Breath sounds: Normal breath sounds. No stridor.   Abdominal:      General: There is no distension.      Palpations: Abdomen is soft. There is no mass.      Tenderness: There is no abdominal tenderness. There is no guarding or rebound.   Musculoskeletal:         General: No tenderness or deformity.      Cervical back: Neck supple.   Lymphadenopathy:      Cervical: No cervical adenopathy.   Skin:     General: Skin is warm and dry.      Coloration: Skin is pale.      Findings: No rash.   Neurological:      Mental Status: She is alert and oriented to person, place, and time.      Gait: Gait normal.   Psychiatric:         Mood and Affect: Mood normal.         Behavior: Behavior normal.       Significant Labs:  CBC:   Recent Labs   Lab 07/13/22  0554 07/13/22  0809 07/13/22  1241   WBC 4.78 4.76 4.74   HGB 5.3*  5.3* 5.4* 5.9*   HCT 16.9*  16.9* 17.1* 18.5*    274 278     BMP:   Recent Labs   Lab 07/13/22  0509   *      K 4.9   *   CO2 20*   BUN 60*   CREATININE 1.7*   CALCIUM 8.5*     CMP:   Recent Labs   Lab 07/13/22  0509   *   CALCIUM 8.5*   ALBUMIN 2.5*   PROT 5.7*      K 4.9   CO2 20*   *   BUN 60*   CREATININE 1.7*   ALKPHOS 132   ALT 9*   AST 14    BILITOT 0.5       Significant Imaging:  Imaging results within the past 24 hours have been reviewed.    Assessment/Plan:     * GIB (gastrointestinal bleeding)  Suspected UGIB in setting of asa and plavix    Recs:  Protonix 80mg IV bolus x 1 then gtt at 8mg/hr  Intravascular resuscitation/support with IVFs   Serial H/H's and pRBCs transfusion as indicated  Discontinue all NSAIDs and Heparin products  Please correct any coagulopathy with platelets and FFP to a goal of platelets >50K and INR <2.0  Maintain IV access with 2 large bore IVs  Npo now    egd today  Higher than avg risk given anticoag use.          Thank you for your consult. I will follow-up with patient. Please contact us if you have any additional questions.    Kannan Mace MD  Gastroenterology  [facility] ([facility])

## 2022-07-13 NOTE — PLAN OF CARE
Report received from YRN Thomas. VSS, AAOx4, NPO since MN. 20 G right FA and 22 G left wrist. covid neg.

## 2022-07-13 NOTE — NURSING
Pt arrived to floor via stretcher with transport and transferred to bed. IVF started,oriented to room, call light placed within reach, bed low and locked, and family at bedside. No complaints of pain. Pure wick noted draining clear yellow urine . Dressing noted to bilateral lower extremiteis , CDI. No acute distress noted at this time. Will continue to monitor.

## 2022-07-13 NOTE — H&P
[facility] Medicine  History & Physical    Patient Name: Sherin Ortiz  MRN: [id number]  Patient Class: OP- Observation  Admission Date: 7/12/2022  Attending Physician: Viky Schwartz MD   Primary Care Provider: Deedee Calderon MD         Patient information was obtained from patient, past medical records and ER records.     Subjective:     Principal Problem:GIB (gastrointestinal bleeding)    Chief Complaint:   Chief Complaint   Patient presents with    Weakness     Patient presents to the ED via [facility] from home with reports of having generalized weakness. States having black stools ever since her leg surgery on 06/30/22. CBG per EMS was 303 mg/dl.    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), Benign essential hypertension (11/14/2012), Cataract, Chronic kidney disease (CKD), stage III (moderate) (9/12/2013), Diabetic peripheral neuropathy associated with type 2 diabetes mellitus (11/14/2014), Gait disorder, Hyperlipidemia, Iritis - Both Eyes (6/10/2013), Kidney stone, Lymphedema, Morbid obesity with BMI of 40.0-44.9, adult (2/18/2015), Nephrolithiasis (4/20/2016), NS (nuclear sclerosis) (4/1/2013), Nuclear sclerosis - Both Eyes (4/29/2013), Preseptal cellulitis - Right Eye (4/29/2013), Proliferative diabetic retinopathy - Both Eyes (4/29/2013), Proliferative diabetic retinopathy, both eyes (4/1/2013), PSC (posterior subcapsular cataract) - Both Eyes (4/29/2013), S/P hernia repair (12/19/2012), TIA (transient ischemic attack) (11/18/2014), Tinea pedis (7/24/2012), Type 2 diabetes mellitus with diabetic polyneuropathy, with long-term current use of insulin (9/18/2015), Type 2 diabetes mellitus with renal manifestations, controlled (12/12/2013), Type 2 diabetes, controlled, with moderate nonproliferative diabetic retinopathy without macular edema (9/17/2015), Ulcer of left lower  "extremity, limited to breakdown of skin (7/8/2015), Unspecified cerebral artery occlusion with cerebral infarction (11/16/2014), Unspecified venous (peripheral) insufficiency, Ureteral stone with hydronephrosis (1/27/2016), UTI (lower urinary tract infection), Vaginal infection, and 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    She presents today due to weakness and black stools. Patient reports having black stools since 07/22. She had a large bowel movement today and since then her stomach has been feeling "bloated". She reports significant weakness with this as well. She denies history of bleeding from the stomach in the past shortness of breath fevers chest pain or new pains today. Patient states that she underwent surgery for veins on her legs on 06/30/2022. She reports taking a baby aspirin and Plavix at home. Denies SOB, CP, nausea, vomiting, or abdominal pain.    In the ED: patient was occult blood positive, Hgb 6.7, CXR without acute processes. Hemodynamically stable otherwise. Admitted to [facility] for further evaluation and GI work up.      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    Benign essential hypertension 11/14/2012    Cataract     s/p phacoemulsification    Chronic kidney disease (CKD), stage III (moderate) 9/12/2013    Diabetic peripheral neuropathy associated with type 2 diabetes mellitus 11/14/2014    causing right hemiparesis    Gait disorder     Hyperlipidemia     Iritis - Both Eyes 6/10/2013    Kidney stone     Lymphedema     Morbid obesity with BMI of 40.0-44.9, adult 2/18/2015    Nephrolithiasis 4/20/2016    NS (nuclear sclerosis) 4/1/2013    Nuclear sclerosis - Both Eyes 4/29/2013    Preseptal cellulitis - Right Eye 4/29/2013    Proliferative diabetic retinopathy - Both Eyes 4/29/2013    Proliferative diabetic retinopathy, both eyes 4/1/2013    PSC (posterior subcapsular cataract) - Both Eyes " 4/29/2013    S/P hernia repair 12/19/2012    TIA (transient ischemic attack) 11/18/2014    Tinea pedis 7/24/2012    Tinea pedis is present on both feet.     Type 2 diabetes mellitus with diabetic polyneuropathy, with long-term current use of insulin 9/18/2015    Type 2 diabetes mellitus with renal manifestations, controlled 12/12/2013    Type 2 diabetes, controlled, with moderate nonproliferative diabetic retinopathy without macular edema 9/17/2015    Ulcer of left lower extremity, limited to breakdown of skin 7/8/2015    Unspecified cerebral artery occlusion with cerebral infarction 11/16/2014    Unspecified venous (peripheral) insufficiency     Ureteral stone with hydronephrosis 1/27/2016    UTI (lower urinary tract infection)     Vaginal infection     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       Past Surgical History:   Procedure Laterality Date    ABLATION Bilateral 6/23/2022    Procedure: Ablation;  Surgeon: Vahid Farfan MD;  [facility] CATH LAB[facility]EP;  Service: Cardiology;  Laterality: Bilateral;    APPENDECTOMY      CATARACT EXTRACTION W/  INTRAOCULAR LENS IMPLANT Left 5/21/2013    CATARACT EXTRACTION W/  INTRAOCULAR LENS IMPLANT Right 6/4/2013    CHOLECYSTECTOMY      COLONOSCOPY  12/22/2005    normal    ESOPHAGOGASTRODUODENOSCOPY  12/21/2015    hiatal hernia, Schatzki ring    EYE SURGERY Bilateral 2008    laser surgery both eyes    NASAL SEPTUM SURGERY      SUBTOTAL COLECTOMY  12/13/2012    transverse colon, for incarcerated umbilical hernia, Dr. Kat Bower       Review of patient's allergies indicates:   Allergen Reactions    Penicillins Hives     Other reaction(s): Hives  Patient has received cefdinir, ceftriaxone, cefazolin and cefepime in the past with no documented reactions    Sulfa (sulfonamide antibiotics) Other (See Comments)     Eyad, pt states her doctor told her the shakes were possibly caused by an allergy to sulfa       No current  "facility-administered medications on file prior to encounter.     Current Outpatient Medications on File Prior to Encounter   Medication Sig    acetaminophen (TYLENOL) 325 MG tablet Take 2 tablets (650 mg total) by mouth every 6 (six) hours as needed for Pain.    aspirin 81 MG Chew Take 81 mg by mouth once daily.    atorvastatin (LIPITOR) 40 MG tablet Take 1 tablet (40 mg total) by mouth every evening.    clopidogreL (PLAVIX) 75 mg tablet Take 1 tablet (75 mg total) by mouth once daily.    BD INSULIN SYRINGE ULTRA-FINE 0.5 mL 30 gauge x 1/2" Syrg USE TO INJECT EVERY NIGHT    blood glucose control, high (TRUE METRIX LEVEL 3) Soln Used to calibrate weekly    blood sugar diagnostic (TRUE METRIX GLUCOSE TEST STRIP) Strp Test twice daily    [] doxycycline (VIBRAMYCIN) 100 MG Cap Take 1 capsule (100 mg total) by mouth every 12 (twelve) hours. for 7 days    ergocalciferol (ERGOCALCIFEROL) 50,000 unit Cap Take 1 capsule (50,000 Units total) by mouth every 7 days. Weekly - 12 weeks, then monthly    insulin glargine (LANTUS U-100 INSULIN) 100 unit/mL injection Inject 10-15 Units into the skin every evening. DEPENDING ON SCALE (DISCARD EACH VIAL AFTER 28 DAYS)    insulin syr/ndl U100 half yesenia (DROPLET INSULIN SYR HALF UNIT) 0.5 mL 30 gauge x 1/2" Syrg USE TO INJECT EVERY NIGHT    lancets (TRUEPLUS LANCETS) 33 gauge Misc 1 lancet by Misc.(Non-Drug; Combo Route) route 2 (two) times a day.    NIFEdipine (PROCARDIA-XL) 60 MG (OSM) 24 hr tablet Take 1 tablet (60 mg total) by mouth once daily.     Family History       Problem Relation (Age of Onset)    Cataracts Sister, Brother    Diabetes Sister    Heart disease Brother    Leukemia Mother          Tobacco Use    Smoking status: Former Smoker     Packs/day: 0.50     Years: 15.00     Pack years: 7.50     Types: Cigarettes     Quit date: 1982     Years since quittin.0    Smokeless tobacco: Former User    Tobacco comment: smoked one pack per week "   Substance and Sexual Activity    Alcohol use: No    Drug use: No    Sexual activity: Not Currently     Review of Systems   Constitutional:  Positive for fatigue.   HENT: Negative.     Eyes: Negative.    Respiratory: Negative.     Cardiovascular: Negative.    Gastrointestinal:  Positive for blood in stool.   Endocrine: Negative.    Genitourinary: Negative.    Musculoskeletal: Negative.    Skin: Negative.    Neurological:  Positive for weakness.   Psychiatric/Behavioral: Negative.     Objective:     Vital Signs (Most Recent):  Temp: 97.5 °F (36.4 °C) (07/13/22 0005)  Pulse: 77 (07/13/22 0005)  Resp: 18 (07/13/22 0005)  BP: (!) 155/70 (07/13/22 0005)  SpO2: 100 % (07/13/22 0005)   Vital Signs (24h Range):  Temp:  [97.5 °F (36.4 °C)-97.8 °F (36.6 °C)] 97.5 °F (36.4 °C)  Pulse:  [74-78] 77  Resp:  [18] 18  SpO2:  [97 %-100 %] 100 %  BP: (143-155)/(60-70) 155/70     Weight: 117.9 kg (260 lb)  Body mass index is 40.72 kg/m².    Physical Exam  Vitals and nursing note reviewed.   Constitutional:       General: She is not in acute distress.     Appearance: Normal appearance. She is obese. She is not ill-appearing or diaphoretic.   HENT:      Head: Normocephalic and atraumatic.      Mouth/Throat:      Mouth: Mucous membranes are dry.   Eyes:      Extraocular Movements: Extraocular movements intact.      Pupils: Pupils are equal, round, and reactive to light.   Cardiovascular:      Rate and Rhythm: Normal rate and regular rhythm.      Pulses: Normal pulses.      Heart sounds: Normal heart sounds.   Pulmonary:      Effort: Pulmonary effort is normal. No respiratory distress.      Breath sounds: Normal breath sounds.   Abdominal:      General: Bowel sounds are normal. There is no distension.      Palpations: Abdomen is soft.      Tenderness: There is no abdominal tenderness. There is no guarding.   Musculoskeletal:         General: No swelling. Normal range of motion.      Cervical back: Normal range of motion.   Skin:      General: Skin is warm.      Capillary Refill: Capillary refill takes 2 to 3 seconds.   Neurological:      General: No focal deficit present.      Mental Status: She is alert. Mental status is at baseline.   Psychiatric:         Mood and Affect: Mood normal.         Behavior: Behavior normal.         Thought Content: Thought content normal.         Judgment: Judgment normal.         CRANIAL NERVES     CN III, IV, VI   Pupils are equal, round, and reactive to light.     Significant Labs: All pertinent labs within the past 24 hours have been reviewed.    Significant Imaging: I have reviewed all pertinent imaging results/findings within the past 24 hours.    Assessment/Plan:     * GIB (gastrointestinal bleeding)  Anemia of chronic disease  -Presented with weakness and melena, takes ASA and Plavix; Hb 6.7 from baseline 10; hemodynamically stable  -Underwent surgery for veins in legs on 6/30/22  -CXR: No evidence of acute chest disease since 2021.  -Occult blood positive  -GI bleed Pathway initiated  -2 LBIV  -Orthostatics ordered  -Initiated on protonix 40mg IV bid  -Hold anticoagulation  -Trend CBC tid for now  -Transfuse I unit PRBCs and for Hb <7  -NPO  -GI consulted    Obesity, morbid (more than 100 lbs over ideal weight or BMI > 40)  -encourage lifestyle modifications (helathy diet, exercise)     (HFpEF) heart failure with preserved ejection fraction  No complaints of CP or SOB  Patient is identified as having Diastolic (HFpEF) heart failure that is Chronic. CHF is currently controlled. Latest ECHO performed and demonstrates- Results for orders placed during the hospital encounter of 11/21/20    Echo Color Flow Doppler? Yes    Interpretation Summary  · Mild left atrial enlargement.  · There is left ventricular eccentric hypertrophy.  · The left ventricle is normal in size with normal systolic function. The estimated ejection fraction is 60%.  · Indeterminate diastolic function.  · Normal right ventricular size with  normal right ventricular systolic function.  · The estimated PA systolic pressure is 31 mmHg.  · Normal central venous pressure (3 mmHg).  Continue CCB and monitor clinical status closely. Monitor on telemetry. Patient is off CHF pathway.  Monitor strict Is&Os and daily weights. Continue to stress to patient importance of self efficacy and  on diet for CHF. Last BNP reviewed- and noted below   Recent Labs   Lab 07/12/22 2054   *       PAOD (peripheral arterial occlusive disease)  Patient takes ASA-hold with GIB       Wheelchair dependent  -fall precautions      Type 2 diabetes mellitus with diabetic polyneuropathy, with long-term current use of insulin  Hemoglobin A1C   Date Value Ref Range Status   06/30/2022 8.4 (H) 4.0 - 5.6 % Final   -Serum glucose on admit 295  -Initiate on detemir 10u daily   -LDSSI with ACCUcheck Q6  -NPO with GI work up  -Hypoglycemia protocol PRN  -Monitor closely and adjust insulin regimen as clinically indicated to achieve levels of 140-180 during hospitalization      Stage 4 chronic kidney disease  Creatine stable for now. BMP reviewed- noted Estimated Creatinine Clearance: 33.6 mL/min (A) (based on SCr of 1.8 mg/dL (H)). according to latest data. Monitor UOP and serial BMP and adjust therapy as needed. Renally dose meds.      Essential hypertension  -Chronic, uncontrolled.  Latest blood pressure and vitals reviewed-   Temp:  [97.5 °F (36.4 °C)-97.8 °F (36.6 °C)]   Pulse:  [74-78]   Resp:  [18]   BP: (143-155)/(60-70)   SpO2:  [97 %-100 %] .   Home meds for hypertension were reviewed and noted below.   Hypertension Medications             NIFEdipine (PROCARDIA-XL) 60 MG (OSM) 24 hr tablet Take 1 tablet (60 mg total) by mouth once daily.      -While in the hospital, will manage blood pressure as follows; Continue home antihypertensive regimen  -Will utilize p.r.n. blood pressure medication only if patient's blood pressure greater than  180/110 and she develops symptoms such  as worsening chest pain or shortness of breath.      Hyperlipidemia LDL goal <100   Patient is chronically on statin.will continue for now. Monitor clinically. Last LDL was   Lab Results   Component Value Date    LDLCALC 104.8 06/30/2022            VTE Risk Mitigation (From admission, onward)         Ordered     Place sequential compression device  Until discontinued         07/13/22 0004                   Lauren Seals DNP, AGACN-BC  Hospitalist   Department of Hospital Medicine   [facility]   Office #: [phone]

## 2022-07-13 NOTE — ASSESSMENT & PLAN NOTE
Hemoglobin A1C   Date Value Ref Range Status   06/30/2022 8.4 (H) 4.0 - 5.6 % Final   -Serum glucose on admit 295  -Initiate on detemir 10u daily   -LDSSI with ACCUcheck Q6  -NPO with GI work up  -Hypoglycemia protocol PRN  -Monitor closely and adjust insulin regimen as clinically indicated to achieve levels of 140-180 during hospitalization

## 2022-07-13 NOTE — PLAN OF CARE
egd today  No obvious source of bleeding  Some mild gastritis    Will plan colonoscopy tomorrow  Bowel prep ordered

## 2022-07-13 NOTE — PLAN OF CARE
Problem: Adult Inpatient Plan of Care  Goal: Plan of Care Review  Outcome: Ongoing, Progressing      07/13/22 1730   Admission   Initial VN Admission Questions Complete  (Patient arrived to unit, AAOx4. Cousin at bedside. Admission questions completed. Allowed time for questions. Instructed pt to call for assistance.)   Communication Issues? None   Shift   Virtual Nurse - Rounding Complete   Virtual Nurse - Patient Verbalized Approval Of Camera Use;VN Rounding   Type of Frequent Check   Type Patient Rounds   Safety/Activity   Patient Rounds call light in patient/parent reach;visualized patient   Safety Promotion/Fall Prevention instructed to call staff for mobility;Fall Risk reviewed with patient/family;family to remain at bedside   Pain/Comfort/Sleep   Preferred Pain Scale word (verbal rating pain scale)   Pain Rating: Rest 0 - no pain   Sleep/Rest/Relaxation awake

## 2022-07-13 NOTE — ED NOTES
Pt brought in by EMS with c/o generalized weakness, abdominal discomfort ,and dark tarry stool that started 22. PT has BLE dressed and wrapped, Clean, dry, intact.  Patient identifiers verified by spelling and stated name on armband along with .     Review of patient's allergies indicates:   Allergen Reactions    Penicillins Hives     Other reaction(s): Hives  Patient has received cefdinir, ceftriaxone, cefazolin and cefepime in the past with no documented reactions    Sulfa (sulfonamide antibiotics) Other (See Comments)     Shakes, pt states her doctor told her the shakes were possibly caused by an allergy to sulfa        APPEARANCE: Alert, oriented and in no acute distress.  CARDIAC: Normal rate and rhythm, no murmur heard.   PERIPHERAL VASCULAR: peripheral pulses present. Normal cap refill. No edema. Warm to touch.    RESPIRATORY:Normal rate and effort, breath sounds clear bilaterally throughout chest. Respirations are equal and unlabored no obvious signs of distress.  GASTRO: soft, bowel sounds normal, no tenderness, no abdominal distention.  MUSC: Full ROM. No bony tenderness or soft tissue tenderness. No obvious deformity.  SKIN: Skin is warm and dry, normal skin turgor, mucous membranes moist. BLE wrapped d/t wounds.  NEURO: 5/5 strength major flexors/extensors bilaterally. Sensory intact to light touch bilaterally. Antionette coma scale: eyes open spontaneously-4, oriented & converses-5, obeys commands-6. No neurological abnormalities.   MENTAL STATUS: awake, alert and aware of environment.  EYE: PERRL, both eyes: pupils brisk and reactive to light. Normal size.  ENT: EARS: no obvious drainage. NOSE: no active bleeding.       Patient verbalized understanding of status and plan of care. Patient changed into hospital gown with assistance. Patient side rails are up x 2, bed is low and locked, call light is in reach  and fall risk band applied to left wrist. Pt educated on fall risk and verbalized  understanding.   Cardiac monitor (alarms on, set, and audible), pulse oximeter, and automatic blood pressure cuff applied.   Will continue to monitor.

## 2022-07-13 NOTE — ASSESSMENT & PLAN NOTE
-Chronic, uncontrolled.  Latest blood pressure and vitals reviewed-   Temp:  [97.5 °F (36.4 °C)-97.8 °F (36.6 °C)]   Pulse:  [74-78]   Resp:  [18]   BP: (143-155)/(60-70)   SpO2:  [97 %-100 %] .   Home meds for hypertension were reviewed and noted below.   Hypertension Medications             NIFEdipine (PROCARDIA-XL) 60 MG (OSM) 24 hr tablet Take 1 tablet (60 mg total) by mouth once daily.      -While in the hospital, will manage blood pressure as follows; Continue home antihypertensive regimen  -Will utilize p.r.n. blood pressure medication only if patient's blood pressure greater than  180/110 and she develops symptoms such as worsening chest pain or shortness of breath.

## 2022-07-13 NOTE — ED PROVIDER NOTES
Encounter Date: 7/12/2022       History     Chief Complaint   Patient presents with    Weakness     Patient presents to the ED via [facility] from home with reports of having generalized weakness. States having black stools ever since her leg surgery on 06/30/22. CBG per EMS was 303 mg/dl.     Meera Ortiz is a 77 y.o. 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), Benign essential hypertension (11/14/2012), Cataract, Chronic kidney disease (CKD), stage III (moderate) (9/12/2013), Diabetic peripheral neuropathy associated with type 2 diabetes mellitus (11/14/2014), Gait disorder, Hyperlipidemia, Iritis - Both Eyes (6/10/2013), Kidney stone, Lymphedema, Morbid obesity with BMI of 40.0-44.9, adult (2/18/2015), Nephrolithiasis (4/20/2016), NS (nuclear sclerosis) (4/1/2013), Nuclear sclerosis - Both Eyes (4/29/2013), Preseptal cellulitis - Right Eye (4/29/2013), Proliferative diabetic retinopathy - Both Eyes (4/29/2013), Proliferative diabetic retinopathy, both eyes (4/1/2013), PSC (posterior subcapsular cataract) - Both Eyes (4/29/2013), S/P hernia repair (12/19/2012), TIA (transient ischemic attack) (11/18/2014), Tinea pedis (7/24/2012), Type 2 diabetes mellitus with diabetic polyneuropathy, with long-term current use of insulin (9/18/2015), Type 2 diabetes mellitus with renal manifestations, controlled (12/12/2013), Type 2 diabetes, controlled, with moderate nonproliferative diabetic retinopathy without macular edema (9/17/2015), Ulcer of left lower extremity, limited to breakdown of skin (7/8/2015), Unspecified cerebral artery occlusion with cerebral infarction (11/16/2014), Unspecified venous (peripheral) insufficiency, Ureteral stone with hydronephrosis (1/27/2016), UTI (lower urinary tract infection), Vaginal infection, and 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      She presents today due to weakness and black stools.  Patient reports  having black stools since 07/22.  She had a large bowel movement today and since then her stomach has been feeling uncomfortable.  She reports significant weakness with this as well.  She denies history of bleeding from the stomach in the past shortness of breath fevers chest pain or new pains today.  Patient states that she underwent surgery for veins on her legs on 06/30/2022        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    Benign essential hypertension 11/14/2012    Cataract     s/p phacoemulsification    Chronic kidney disease (CKD), stage III (moderate) 9/12/2013    Diabetic peripheral neuropathy associated with type 2 diabetes mellitus 11/14/2014    causing right hemiparesis    Gait disorder     Hyperlipidemia     Iritis - Both Eyes 6/10/2013    Kidney stone     Lymphedema     Morbid obesity with BMI of 40.0-44.9, adult 2/18/2015    Nephrolithiasis 4/20/2016    NS (nuclear sclerosis) 4/1/2013    Nuclear sclerosis - Both Eyes 4/29/2013    Preseptal cellulitis - Right Eye 4/29/2013    Proliferative diabetic retinopathy - Both Eyes 4/29/2013    Proliferative diabetic retinopathy, both eyes 4/1/2013    PSC (posterior subcapsular cataract) - Both Eyes 4/29/2013    S/P hernia repair 12/19/2012    TIA (transient ischemic attack) 11/18/2014    Tinea pedis 7/24/2012    Tinea pedis is present on both feet.     Type 2 diabetes mellitus with diabetic polyneuropathy, with long-term current use of insulin 9/18/2015    Type 2 diabetes mellitus with renal manifestations,  controlled 2013    Type 2 diabetes, controlled, with moderate nonproliferative diabetic retinopathy without macular edema 2015    Ulcer of left lower extremity, limited to breakdown of skin 2015    Unspecified cerebral artery occlusion with cerebral infarction 2014    Unspecified venous (peripheral) insufficiency     Ureteral stone with hydronephrosis 2016    UTI (lower urinary tract infection)     Vaginal infection     Vertical heterotropia - Both Eyes 2013     Past Surgical History:   Procedure Laterality Date    ABLATION Bilateral 2022    Procedure: Ablation;  Surgeon: Vahid Farfan MD;  Location: [facility] CATH LAB/EP;  Service: Cardiology;  Laterality: Bilateral;    APPENDECTOMY      CATARACT EXTRACTION W/  INTRAOCULAR LENS IMPLANT Left 2013    CATARACT EXTRACTION W/  INTRAOCULAR LENS IMPLANT Right 2013    CHOLECYSTECTOMY      COLONOSCOPY  2005    normal    ESOPHAGOGASTRODUODENOSCOPY  2015    hiatal hernia, Schatzki ring    EYE SURGERY Bilateral     laser surgery both eyes    NASAL SEPTUM SURGERY      SUBTOTAL COLECTOMY  2012    transverse colon, for incarcerated umbilical hernia, Dr. Kat Bower     Family History   Problem Relation Age of Onset    Diabetes Sister     Cataracts Sister     Heart disease Brother     Cataracts Brother     Leukemia Mother     Cancer Neg Hx     Amblyopia Neg Hx     Blindness Neg Hx     Glaucoma Neg Hx     Hypertension Neg Hx     Macular degeneration Neg Hx     Retinal detachment Neg Hx     Strabismus Neg Hx     Stroke Neg Hx     Thyroid disease Neg Hx     Kidney disease Neg Hx      Social History     Tobacco Use    Smoking status: Former Smoker     Packs/day: 0.50     Years: 15.00     Pack years: 7.50     Types: Cigarettes     Quit date: 1982     Years since quittin.0    Smokeless tobacco: Former User    Tobacco comment: smoked one pack per week   Substance Use  Topics    Alcohol use: No    Drug use: No     Review of Systems   Constitutional: Negative for chills and fever.   HENT: Negative for sore throat.    Respiratory: Negative for cough and shortness of breath.    Cardiovascular: Negative for chest pain.   Gastrointestinal: Positive for abdominal pain and blood in stool. Negative for nausea and vomiting.   Genitourinary: Negative for dysuria, frequency and urgency.   Musculoskeletal: Negative for back pain, neck pain and neck stiffness.   Skin: Negative for rash and wound.   Neurological: Positive for weakness. Negative for syncope.   Hematological: Does not bruise/bleed easily.   Psychiatric/Behavioral: Negative for agitation, behavioral problems and confusion.       Physical Exam     Initial Vitals [07/12/22 1954]   BP Pulse Resp Temp SpO2   (!) 145/60 78 18 97.5 °F (36.4 °C) 97 %      MAP       --         Physical Exam    Nursing note and vitals reviewed.  Constitutional: She appears well-developed and well-nourished. She is not diaphoretic. No distress.   HENT:   Head: Normocephalic and atraumatic.   Mouth/Throat: Oropharynx is clear and moist.   Eyes: EOM are normal. Pupils are equal, round, and reactive to light.   Neck: No tracheal deviation present.   Cardiovascular: Normal rate, regular rhythm, normal heart sounds and intact distal pulses.   Pulmonary/Chest: Breath sounds normal. No stridor. No respiratory distress. She has no wheezes.   Abdominal: Abdomen is soft. Bowel sounds are normal. She exhibits no distension and no mass. There is no abdominal tenderness.   Genitourinary:    Genitourinary Comments: Rectal exam with RN Stanley:  No gross blood scant black colored stool     Musculoskeletal:         General: No edema. Normal range of motion.     Neurological: She is alert and oriented to person, place, and time. No cranial nerve deficit or sensory deficit.   Skin: Skin is warm and dry. Capillary refill takes less than 2 seconds. No rash noted.   Psychiatric:  She has a normal mood and affect.         ED Course   Procedures  Labs Reviewed   CBC W/ AUTO DIFFERENTIAL - Abnormal; Notable for the following components:       Result Value    WBC 2.82 (*)     RBC 2.27 (*)     Hemoglobin 6.7 (*)     Hematocrit 21.7 (*)     MCHC 30.9 (*)     RDW 16.5 (*)     MPV 9.0 (*)     Lymph # 0.7 (*)     Mono # 0.2 (*)     All other components within normal limits   COMPREHENSIVE METABOLIC PANEL - Abnormal; Notable for the following components:    Chloride 111 (*)     CO2 18 (*)     Glucose 247 (*)     BUN 61 (*)     Creatinine 1.8 (*)     Calcium 8.6 (*)     Albumin 2.6 (*)     Alkaline Phosphatase 141 (*)     ALT 9 (*)     eGFR if  30 (*)     eGFR if non  26 (*)     All other components within normal limits   B-TYPE NATRIURETIC PEPTIDE - Abnormal; Notable for the following components:     (*)     All other components within normal limits   OCCULT BLOOD X 1, STOOL - Abnormal; Notable for the following components:    Occult Blood Positive (*)     All other components within normal limits   POCT GLUCOSE - Abnormal; Notable for the following components:    POCT Glucose 295 (*)     All other components within normal limits   TROPONIN I   URINALYSIS, REFLEX TO URINE CULTURE   SARS-COV-2 RDRP GENE   TYPE & SCREEN   PREPARE RBC SOFT          Imaging Results          X-Ray Chest 1 View (Final result)  Result time 07/12/22 21:32:25    Final result by Damien Rodriguez MD (07/12/22 21:32:25)                 Impression:      No evidence of acute chest disease since 2021.      Electronically signed by: Damien Rodriguez  Date:    07/12/2022  Time:    21:32             Narrative:    EXAMINATION:  XR CHEST 1 VIEW    CLINICAL HISTORY:  Weakness    TECHNIQUE:  Single frontal view of the chest was performed.    COMPARISON:  01/13/2021    FINDINGS:  The cardiac silhouette remains un enlarged with the trachea midline allowing for rotation to the left.  Prominence in the  infrahilar lung markings especially on the right appear less pronounced.  There is no new consolidation or effusion.                                 Medications   0.9%  NaCl infusion (for blood administration) ( Intravenous New Bag 7/12/22 9238)     Medical Decision Making:   Differential Diagnosis:   Differential Diagnosis includes, but is not limited to:  CVA/TIA, vertigo, anemia/blood loss, cardiogenic shock, arrhythmia, orthostatic hypotension, dehydration, medication side effect, vitamin deficiency, liver disease, hypothyroidism, drug intoxication/withdrawal, metabolic derangement.    ED Management:  Labs demonstrate hemoglobin is 6.7.  This appears new.  She has stable chronic kidney disease.  1 unit of packed red blood cells ordered.  Will plan to admit for melena/symptomatic anemia and GI evaluation.  On reassessment patient's abdominal exam remains benign and stable her vital signs are stable.  She will be admitted to [facility].             Attending Attestation:         Attending Critical Care:   Critical Care Times:   Direct Patient Care (initial evaluation, reassessments, and time considering the case)................................................................15 minutes.   Additional History from reviewing old medical records or taking additional history from the family, EMS, PCP, etc.......................3 minutes.   Ordering, Reviewing, and Interpreting Diagnostic Studies...............................................................................................................5 minutes.   Documentation..................................................................................................................................................................................5 minutes.   Consultation with other Physicians. .................................................................................................................................................5 minutes.    ==============================================================  · Total Critical Care Time - exclusive of procedural time: 33 minutes.  ==============================================================  Critical care was necessary to treat or prevent imminent or life-threatening deterioration of the following conditions: GI bleeding.           ED Course as of 07/13/22 0003 Tue Jul 12, 2022 2109 EKG:  Rate 76. Sinus rhythm.  Right bundle-branch block.  No STEMI. [RN]      ED Course User Index  [RN] Yoel Freeman Jr., MD             Clinical Impression:   Final diagnoses:  [R53.1] Weakness  [K92.1] Melena (Primary)  [D64.9] Anemia, unspecified type          ED Disposition Condition    Admit       Portions of this note were dictated using voice recognition software and may contain dictation related errors in spelling/grammar/syntax not found on text review          Yoel Freeman Jr., MD  07/13/22 0004

## 2022-07-13 NOTE — PLAN OF CARE
Patient recovered to baseline. Md spoke with patient regarding procedural results . Attending RN notified of procedural results, VSS.  Verbal order to transfer back to unit.   Family member Diane Alcantar now at bedside,  spoke to both paitent and family member regarding procedural results and colonoscopy plan for tomorrow.    Prep information explained to both , ie importance of drinking prep and npopost  mn  Report of above giving to RN Marina.  Patient is awake alert and oriented , denies pain or discomfort.    Per attending RN ok to imput volume of infusion (PRBC) of 553ml

## 2022-07-13 NOTE — ANESTHESIA PREPROCEDURE EVALUATION
07/13/2022  Sherin Ortiz is a 77 y.o., female.  Sherin Ortiz is a 77 y.o. female with HTN, HLD, DM2, CKD3 , PAD     LDA:   20G R Forearm PIV    Prev airway:   Easy mask   Alvarado 2  Grade I    Drips:   None  Patient Active Problem List   Diagnosis    Bilateral leg edema    Hyperlipidemia LDL goal <100    Essential hypertension    Debility    PSC (posterior subcapsular cataract) - Both Eyes    Nuclear sclerosis - Both Eyes    Asteroid hyalosis - Left Eye    Stage 4 chronic kidney disease    Inability to walk    Morbid obesity with body mass index (BMI) greater than or equal to 50    Type 2 diabetes mellitus with diabetic polyneuropathy, with long-term current use of insulin    Anemia of chronic disease    Hypoalbuminemia    Wheelchair dependent    Dermatitis, stasis    Hyperparathyroidism    Aortic atherosclerosis    PAOD (peripheral arterial occlusive disease)    Hx of transient ischemic attack (TIA)    (HFpEF) heart failure with preserved ejection fraction    Goals of care, counseling/discussion    Dermatitis associated with moisture    Lymphedema    Closed nondisplaced fracture of left calcaneus    Osteopenia    Charcot ankle, left    Benign hypertensive heart and kidney disease with HF and CKD    Type 2 diabetes mellitus with hypoglycemia without coma, with long-term current use of insulin    Unable to bear weight    UTI due to extended-spectrum beta lactamase (ESBL) producing Escherichia coli    Venous ulcer of both lower extremities with varicose veins    Ulcer of great toe, left, limited to breakdown of skin    Obesity, morbid (more than 100 lbs over ideal weight or BMI > 40)    Skin ulcer of left great toe with fat layer exposed    Venous insufficiency of both lower extremities    Venous reflux    Pyelonephritis    Adrenal nodule    Obesity, diabetes, and  hypertension syndrome    Avulsed toenail, initial encounter    Toenail deformity    Venous ulcer, limited to breakdown of skin    GIB (gastrointestinal bleeding)       Review of patient's allergies indicates:   Allergen Reactions    Penicillins Hives     Other reaction(s): Hives  Patient has received cefdinir, ceftriaxone, cefazolin and cefepime in the past with no documented reactions    Sulfa (sulfonamide antibiotics) Other (See Comments)     Shakes, pt states her doctor told her the shakes were possibly caused by an allergy to sulfa        Current Facility-Administered Medications on File Prior to Visit   Medication Dose Route Frequency Provider Last Rate Last Admin    0.9%  NaCl infusion (for blood administration)   Intravenous Q24H PRN Yoel Freeman Jr., MD   Stopped at 07/13/22 0219    0.9%  NaCl infusion (for blood administration)   Intravenous Q24H PRN Su Maddox MD        0.9%  NaCl infusion   Intravenous Continuous Lauren Seals NP 75 mL/hr at 07/13/22 0221 New Bag at 07/13/22 0221    acetaminophen tablet 650 mg  650 mg Oral Q8H PRN Lauren Seals NP        albuterol-ipratropium 2.5 mg-0.5 mg/3 mL nebulizer solution 3 mL  3 mL Nebulization Q4H PRN Lauren Seals NP        atorvastatin tablet 40 mg  40 mg Oral QHS Lauren Seals NP        dextrose 10% bolus 125 mL  12.5 g Intravenous PRN Lauren Seals NP        dextrose 10% bolus 250 mL  25 g Intravenous PRN Lauren Seals NP        glucagon (human recombinant) injection 1 mg  1 mg Intramuscular PRN Lauren Seals NP        glucose chewable tablet 16 g  16 g Oral PRN Lauren Seals NP        glucose chewable tablet 24 g  24 g Oral PRN Lauren Seals NP        insulin aspart U-100 pen 0-5 Units  0-5 Units Subcutaneous Q6H PRN Lauren Seals NP   2 Units at 07/13/22 0533    melatonin tablet 6 mg  6 mg Oral Nightly PRN Lauren Seals NP        naloxone 0.4  "mg/mL injection 0.02 mg  0.02 mg Intravenous PRN Lauren Seals NP        NIFEdipine 24 hr tablet 60 mg  60 mg Oral Daily Laurne Seals NP   60 mg at 07/13/22 0924    ondansetron injection 4 mg  4 mg Intravenous Q8H PRN Lauren Seals NP        pantoprazole injection 40 mg  40 mg Intravenous BID Lauren Seals NP   40 mg at 07/13/22 0925    sodium chloride 0.9% flush 10 mL  10 mL Intravenous Q8H Lauren Seals NP         Current Outpatient Medications on File Prior to Visit   Medication Sig Dispense Refill    acetaminophen (TYLENOL) 500 MG tablet Take 1,000 mg by mouth as needed for Pain.      aspirin 81 MG Chew Take 81 mg by mouth once daily.      atorvastatin (LIPITOR) 40 MG tablet Take 1 tablet (40 mg total) by mouth every evening. 90 tablet 3    BD INSULIN SYRINGE ULTRA-FINE 0.5 mL 30 gauge x 1/2" Syrg USE TO INJECT EVERY NIGHT 90 each 3    blood glucose control, high (TRUE METRIX LEVEL 3) Soln Used to calibrate weekly 1 each 3    blood sugar diagnostic (TRUE METRIX GLUCOSE TEST STRIP) Strp Test twice daily 200 strip 3    clopidogreL (PLAVIX) 75 mg tablet Take 1 tablet (75 mg total) by mouth once daily. (Patient not taking: Reported on 7/13/2022) 90 tablet 3    ergocalciferol (ERGOCALCIFEROL) 50,000 unit Cap Take 1 capsule (50,000 Units total) by mouth every 7 days. Weekly - 12 weeks, then monthly (Patient taking differently: Take 50,000 Units by mouth every 30 days. Weekly - 12 weeks, then monthly) 12 capsule 3    insulin glargine (LANTUS U-100 INSULIN) 100 unit/mL injection Inject 10-15 Units into the skin every evening. DEPENDING ON SCALE (DISCARD EACH VIAL AFTER 28 DAYS) 2 each 3    insulin syr/ndl U100 half yesenia (DROPLET INSULIN SYR HALF UNIT) 0.5 mL 30 gauge x 1/2" Syrg USE TO INJECT EVERY NIGHT 100 Syringe 4    lancets (TRUEPLUS LANCETS) 33 gauge Misc 1 lancet by Misc.(Non-Drug; Combo Route) route 2 (two) times a day. 200 each 0    NIFEdipine (PROCARDIA-XL) " 60 MG (OSM) 24 hr tablet Take 1 tablet (60 mg total) by mouth once daily. (Patient not taking: Reported on 2022) 90 tablet 3       Past Surgical History:   Procedure Laterality Date    ABLATION Bilateral 2022    Procedure: Ablation;  Surgeon: Vahid Farfan MD;  Location: [facility] CATH LAB/EP;  Service: Cardiology;  Laterality: Bilateral;    APPENDECTOMY      CATARACT EXTRACTION W/  INTRAOCULAR LENS IMPLANT Left 2013    CATARACT EXTRACTION W/  INTRAOCULAR LENS IMPLANT Right 2013    CHOLECYSTECTOMY      COLONOSCOPY  2005    normal    ESOPHAGOGASTRODUODENOSCOPY  2015    hiatal hernia, Schatzki ring    EYE SURGERY Bilateral     laser surgery both eyes    NASAL SEPTUM SURGERY      SUBTOTAL COLECTOMY  2012    transverse colon, for incarcerated umbilical hernia, Dr. Kat Bower       Social History     Socioeconomic History    Marital status:    Tobacco Use    Smoking status: Former Smoker     Packs/day: 0.50     Years: 15.00     Pack years: 7.50     Types: Cigarettes     Quit date: 1982     Years since quittin.0    Smokeless tobacco: Former User    Tobacco comment: smoked one pack per week   Substance and Sexual Activity    Alcohol use: No    Drug use: No    Sexual activity: Not Currently   Social History Narrative    Lives alone. Ambulatory via motorized wheelchair. Catches bus to come to \[facility]".      Social Determinants of Health     Financial Resource Strain: Low Risk     Difficulty of Paying Living Expenses: Not very hard   Food Insecurity: No Food Insecurity    Worried About Running Out of Food in the Last Year: Never true    Ran Out of Food in the Last Year: Never true   Transportation Needs: No Transportation Needs    Lack of Transportation (Medical): No    Lack of Transportation (Non-Medical): No   Housing Stability: Low Risk     Unable to Pay for Housing in the Last Year: No    Number of Places Lived in the Last Year: 1     Unstable Housing in the Last Year: No         Vital Signs Range (Last 24H):  Temp:  [36.3 °C (97.3 °F)-37.1 °C (98.7 °F)]   Pulse:  [68-78]   Resp:  [18-29]   BP: (120-171)/(46-71)   SpO2:  [97 %-100 %]       CBC:   Recent Labs     07/13/22  0809 07/13/22  1241   WBC 4.76 4.74   RBC 1.86* 2.02*   HGB 5.4* 5.9*   HCT 17.1* 18.5*    278   MCV 92 92   MCH 29.0 29.2   MCHC 31.6* 31.9*       CMP:   Recent Labs     07/12/22 2054 07/13/22  0509    142   K 4.8 4.9   * 113*   CO2 18* 20*   BUN 61* 60*   CREATININE 1.8* 1.7*   * 182*   CALCIUM 8.6* 8.5*   ALBUMIN 2.6* 2.5*   PROT 6.4 5.7*   ALKPHOS 141* 132   ALT 9* 9*   AST 14 14   BILITOT 0.3 0.5       INR  No results for input(s): PT, INR, PROTIME, APTT in the last 72 hours.      EKG:  Vent. Rate : 073 BPM     Atrial Rate : 073 BPM     P-R Int : 304 ms          QRS Dur : 174 ms      QT Int : 464 ms       P-R-T Axes : 063 -51 043 degrees     QTc Int : 511 ms    Sinus rhythm with 1st degree A-V block  Left atrial abnormality/enlargement  Right bundle branch block  Left anterior fascicular block  Bifascicular block   Abnormal ECG  When compared with ECG of 06-JAN-2018 15:11,  No significant change was found  Confirmed by PIPPA MACEDO MD ([id number]) on 1/22/2018 11:36:11 AM    2D Echo:  Technically difficult study    1 - Mild left ventricular enlargement.     2 - Normal left ventricular systolic function (EF 60-65%).     3 - Right ventricular enlargement with normal systolic function.     4 - Grade I left ventricular diastolic dysfunction.     5 - Biatrial enlargement.     6 - Elevated central venous pressure.     This document has been electronically    SIGNED BY: Oliver Maruqez MD On: 11/17/2014 16:00      Pre-op Assessment    I have reviewed the Patient Summary Reports.    I have reviewed the Nursing Notes.    I have reviewed the Medications.     Review of Systems  Anesthesia Hx:  No problems with previous Anesthesia    Social:  Former Smoker     Hematology/Oncology:     Oncology Normal    -- Anemia:   EENT/Dental:EENT/Dental Normal   Cardiovascular:   Hypertension PVD hyperlipidemia    Pulmonary:  Pulmonary Normal    Renal/:   Chronic Renal Disease, CRI    Hepatic/GI:  Hepatic/GI Normal    Musculoskeletal:  Musculoskeletal Normal    Neurological:   TIA,    Endocrine:   Diabetes, poorly controlled, type 2    Dermatological:   left hand cellulits   Psych:  Psychiatric Normal           Physical Exam  General:  Morbid Obesity      Airway/Jaw/Neck:  Airway Findings: Mouth Opening: Normal   Tongue: Normal   General Airway Assessment: Adult Mallampati: I  TM Distance: Normal, at least 6 cm   Jaw/Neck Findings:  Neck ROM: Normal ROM      Eyes/Ears/Nose:  EYES/EARS/NOSE FINDINGS: Normal   Dental:  Dental Findings: Edentulous     Chest/Lungs:  Chest/Lungs Findings: Clear to auscultation      Heart/Vascular:  Heart Findings: Rate: Normal  Rhythm: Regular Rhythm  Heart murmur: negative      Skin:  Venous static changes ble with weeping wounds; left hand wrapped   Mental Status:  Mental Status Findings:  Cooperative, Alert and Oriented         Anesthesia Plan  Type of Anesthesia, risks & benefits discussed:  Anesthesia Type:  general    Patient's Preference:   Plan Factors:          Intra-op Monitoring Plan: standard ASA monitors  Intra-op Monitoring Plan Comments:   Post Op Pain Control Plan: multimodal analgesia, IV/PO Opioids PRN and per primary service following discharge from PACU  Post Op Pain Control Plan Comments:     Induction:   IV  Beta Blocker:         Informed Consent: Informed consent signed with the Patient and all parties understand the risks and agree with anesthesia plan.  All questions answered.  Anesthesia consent signed with patient.  ASA Score: 3     Day of Surgery Review of History & Physical:              Ready For Surgery From Anesthesia Perspective.           Physical Exam  General: Morbid Obesity    Airway:  Mallampati: I   Mouth Opening:  Normal  TM Distance: Normal, at least 6 cm  Tongue: Normal  Neck ROM: Normal ROM    Dental:  Edentulous    Chest/Lungs:  Clear to auscultation    Heart:  Rate: Normal  Rhythm: Regular Rhythm          Anesthesia Plan  Type of Anesthesia, risks & benefits discussed:    Anesthesia Type: general  Intra-op Monitoring Plan: standard ASA monitors  Post Op Pain Control Plan: multimodal analgesia, IV/PO Opioids PRN and per primary service following discharge from PACU  Induction:  IV  Informed Consent: Informed consent signed with the Patient and all parties understand the risks and agree with anesthesia plan.  All questions answered.   ASA Score: 3    Ready For Surgery From Anesthesia Perspective.       .

## 2022-07-13 NOTE — PROVATION PATIENT INSTRUCTIONS
Discharge Summary/Instructions after an Endoscopic Procedure  Patient Name: Sherin Ortiz  Patient MRN: [id number]  Patient YOB: 1945 Wednesday, July 13, 2022  Kannan Mace MD  Dear patient,  As a result of recent federal legislation (The Federal Cures Act), you may   receive lab or pathology results from your procedure in your [facility]   account before your physician is able to contact you. Your physician or   their representative will relay the results to you with their   recommendations at their soonest availability.  Thank you,  Your health is very important to us during the Covid Crisis. Following your   procedure today, you will receive a daily text for 2 weeks asking about   signs or symptoms of Covid 19.  Please respond to this text when you   receive it so we can follow up and keep you as safe as possible.   RESTRICTIONS:  During your procedure today, you received medications for sedation.  These   medications may affect your judgment, balance and coordination.  Therefore,   for 24 hours, you have the following restrictions:   - DO NOT drive a car, operate machinery, make legal/financial decisions,   sign important papers or drink alcohol.    ACTIVITY:  Today: no heavy lifting, straining or running due to procedural   sedation/anesthesia.  The following day: return to full activity including work.  DIET:  Eat and drink normally unless instructed otherwise.     TREATMENT FOR COMMON SIDE EFFECTS:  - Mild abdominal pain, nausea, belching, bloating or excessive gas:  rest,   eat lightly and use a heating pad.  - Sore Throat: treat with throat lozenges and/or gargle with warm salt   water.  - Because air was used during the procedure, expelling large amounts of air   from your rectum or belching is normal.  - If a bowel prep was taken, you may not have a bowel movement for 1-3 days.    This is normal.  SYMPTOMS TO WATCH FOR AND REPORT TO YOUR PHYSICIAN:  1. Abdominal pain or bloating, other than  gas cramps.  2. Chest pain.  3. Back pain.  4. Signs of infection such as: chills or fever occurring within 24 hours   after the procedure.  5. Rectal bleeding, which would show as bright red, maroon, or black stools.   (A tablespoon of blood from the rectum is not serious, especially if   hemorrhoids are present.)  6. Vomiting.  7. Weakness or dizziness.  GO DIRECTLY TO THE NEAREST EMERGENCY ROOM IF YOU HAVE ANY OF THE FOLLOWING:      Difficulty breathing              Chills and/or fever over 101 F   Persistent vomiting and/or vomiting blood   Severe abdominal pain   Severe chest pain   Black, tarry stools   Bleeding- more than one tablespoon   Any other symptom or condition that you feel may need urgent attention  Your doctor recommends these additional instructions:  If any biopsies were taken, your doctors clinic will contact you in 1 to 2   weeks with any results.  - Return patient to [facility] for ongoing care.   - Clear liquid diet.   - Continue present medications.   - Perform a colonoscopy tomorrow.  For questions, problems or results please call your physician - Kannan Mace MD.  EMERGENCY PHONE NUMBER: [phone],  LAB RESULTS: [phone]  IF A COMPLICATION OR EMERGENCY SITUATION ARISES AND YOU ARE UNABLE TO REACH   YOUR PHYSICIAN - GO DIRECTLY TO THE EMERGENCY ROOM.  Kannan Mace MD  7/13/2022 3:16:36 PM  This report has been verified and signed electronically.  Dear patient,  As a result of recent federal legislation (The Federal Cures Act), you may   receive lab or pathology results from your procedure in your [facility]   account before your physician is able to contact you. Your physician or   their representative will relay the results to you with their   recommendations at their soonest availability.  Thank you,  PROVATION

## 2022-07-14 ENCOUNTER — ANESTHESIA (OUTPATIENT)
Dept: ENDOSCOPY | Facility: HOSPITAL | Age: 77
DRG: 394 | End: 2022-07-14
Payer: MEDICARE

## 2022-07-14 ENCOUNTER — ANESTHESIA EVENT (OUTPATIENT)
Dept: ENDOSCOPY | Facility: HOSPITAL | Age: 77
DRG: 394 | End: 2022-07-14
Payer: MEDICARE

## 2022-07-14 LAB
ALBUMIN SERPL BCP-MCNC: 2.4 G/DL (ref 3.5–5.2)
ALP SERPL-CCNC: 128 U/L (ref 55–135)
ALT SERPL W/O P-5'-P-CCNC: 11 U/L (ref 10–44)
ANION GAP SERPL CALC-SCNC: 9 MMOL/L (ref 8–16)
AST SERPL-CCNC: 16 U/L (ref 10–40)
BASOPHILS # BLD AUTO: 0.02 K/UL (ref 0–0.2)
BASOPHILS # BLD AUTO: 0.02 K/UL (ref 0–0.2)
BASOPHILS NFR BLD: 0.7 % (ref 0–1.9)
BASOPHILS NFR BLD: 0.7 % (ref 0–1.9)
BILIRUB SERPL-MCNC: 0.5 MG/DL (ref 0.1–1)
BLD PROD TYP BPU: NORMAL
BLOOD UNIT EXPIRATION DATE: NORMAL
BLOOD UNIT TYPE CODE: 6200
BLOOD UNIT TYPE: NORMAL
BUN SERPL-MCNC: 51 MG/DL (ref 8–23)
CALCIUM SERPL-MCNC: 8.2 MG/DL (ref 8.7–10.5)
CHLORIDE SERPL-SCNC: 113 MMOL/L (ref 95–110)
CO2 SERPL-SCNC: 20 MMOL/L (ref 23–29)
CODING SYSTEM: NORMAL
CREAT SERPL-MCNC: 1.4 MG/DL (ref 0.5–1.4)
DIFFERENTIAL METHOD: ABNORMAL
DIFFERENTIAL METHOD: ABNORMAL
DISPENSE STATUS: NORMAL
EOSINOPHIL # BLD AUTO: 0.1 K/UL (ref 0–0.5)
EOSINOPHIL # BLD AUTO: 0.1 K/UL (ref 0–0.5)
EOSINOPHIL NFR BLD: 2.4 % (ref 0–8)
EOSINOPHIL NFR BLD: 2.5 % (ref 0–8)
ERYTHROCYTE [DISTWIDTH] IN BLOOD BY AUTOMATED COUNT: 16.7 % (ref 11.5–14.5)
ERYTHROCYTE [DISTWIDTH] IN BLOOD BY AUTOMATED COUNT: 16.8 % (ref 11.5–14.5)
EST. GFR  (AFRICAN AMERICAN): 41 ML/MIN/1.73 M^2
EST. GFR  (NON AFRICAN AMERICAN): 36 ML/MIN/1.73 M^2
GLUCOSE SERPL-MCNC: 138 MG/DL (ref 70–110)
HCT VFR BLD AUTO: 20.8 % (ref 37–48.5)
HCT VFR BLD AUTO: 20.8 % (ref 37–48.5)
HGB BLD-MCNC: 6.7 G/DL (ref 12–16)
HGB BLD-MCNC: 6.7 G/DL (ref 12–16)
IMM GRANULOCYTES # BLD AUTO: 0 K/UL (ref 0–0.04)
IMM GRANULOCYTES # BLD AUTO: 0.01 K/UL (ref 0–0.04)
IMM GRANULOCYTES NFR BLD AUTO: 0 % (ref 0–0.5)
IMM GRANULOCYTES NFR BLD AUTO: 0.3 % (ref 0–0.5)
LYMPHOCYTES # BLD AUTO: 0.9 K/UL (ref 1–4.8)
LYMPHOCYTES # BLD AUTO: 0.9 K/UL (ref 1–4.8)
LYMPHOCYTES NFR BLD: 30.3 % (ref 18–48)
LYMPHOCYTES NFR BLD: 30.5 % (ref 18–48)
MCH RBC QN AUTO: 28.9 PG (ref 27–31)
MCH RBC QN AUTO: 29.4 PG (ref 27–31)
MCHC RBC AUTO-ENTMCNC: 32.2 G/DL (ref 32–36)
MCHC RBC AUTO-ENTMCNC: 32.2 G/DL (ref 32–36)
MCV RBC AUTO: 90 FL (ref 82–98)
MCV RBC AUTO: 91 FL (ref 82–98)
MONOCYTES # BLD AUTO: 0.3 K/UL (ref 0.3–1)
MONOCYTES # BLD AUTO: 0.3 K/UL (ref 0.3–1)
MONOCYTES NFR BLD: 10.6 % (ref 4–15)
MONOCYTES NFR BLD: 11.3 % (ref 4–15)
NEUTROPHILS # BLD AUTO: 1.6 K/UL (ref 1.8–7.7)
NEUTROPHILS # BLD AUTO: 1.6 K/UL (ref 1.8–7.7)
NEUTROPHILS NFR BLD: 54.8 % (ref 38–73)
NEUTROPHILS NFR BLD: 55.9 % (ref 38–73)
NRBC BLD-RTO: 0 /100 WBC
NRBC BLD-RTO: 0 /100 WBC
PLATELET # BLD AUTO: 256 K/UL (ref 150–450)
PLATELET # BLD AUTO: 263 K/UL (ref 150–450)
PMV BLD AUTO: 8.8 FL (ref 9.2–12.9)
PMV BLD AUTO: 8.9 FL (ref 9.2–12.9)
POCT GLUCOSE: 142 MG/DL (ref 70–110)
POCT GLUCOSE: 162 MG/DL (ref 70–110)
POCT GLUCOSE: 162 MG/DL (ref 70–110)
POCT GLUCOSE: 179 MG/DL (ref 70–110)
POCT GLUCOSE: 216 MG/DL (ref 70–110)
POTASSIUM SERPL-SCNC: 4.7 MMOL/L (ref 3.5–5.1)
PROT SERPL-MCNC: 5.5 G/DL (ref 6–8.4)
RBC # BLD AUTO: 2.28 M/UL (ref 4–5.4)
RBC # BLD AUTO: 2.32 M/UL (ref 4–5.4)
SODIUM SERPL-SCNC: 142 MMOL/L (ref 136–145)
TRANS ERYTHROCYTES VOL PATIENT: NORMAL ML
WBC # BLD AUTO: 2.84 K/UL (ref 3.9–12.7)
WBC # BLD AUTO: 2.92 K/UL (ref 3.9–12.7)

## 2022-07-14 PROCEDURE — 63600175 PHARM REV CODE 636 W HCPCS: Performed by: NURSE PRACTITIONER

## 2022-07-14 PROCEDURE — 85025 COMPLETE CBC W/AUTO DIFF WBC: CPT | Performed by: NURSE PRACTITIONER

## 2022-07-14 PROCEDURE — 80053 COMPREHEN METABOLIC PANEL: CPT | Performed by: NURSE PRACTITIONER

## 2022-07-14 PROCEDURE — 25000003 PHARM REV CODE 250: Performed by: NURSE ANESTHETIST, CERTIFIED REGISTERED

## 2022-07-14 PROCEDURE — G0378 HOSPITAL OBSERVATION PER HR: HCPCS

## 2022-07-14 PROCEDURE — 94761 N-INVAS EAR/PLS OXIMETRY MLT: CPT

## 2022-07-14 PROCEDURE — 36415 COLL VENOUS BLD VENIPUNCTURE: CPT | Performed by: NURSE PRACTITIONER

## 2022-07-14 PROCEDURE — 45378 DIAGNOSTIC COLONOSCOPY: CPT | Performed by: INTERNAL MEDICINE

## 2022-07-14 PROCEDURE — 45378 DIAGNOSTIC COLONOSCOPY: CPT | Mod: ,,, | Performed by: INTERNAL MEDICINE

## 2022-07-14 PROCEDURE — A4216 STERILE WATER/SALINE, 10 ML: HCPCS | Performed by: NURSE PRACTITIONER

## 2022-07-14 PROCEDURE — 85025 COMPLETE CBC W/AUTO DIFF WBC: CPT | Mod: 91 | Performed by: FAMILY MEDICINE

## 2022-07-14 PROCEDURE — 37000008 HC ANESTHESIA 1ST 15 MINUTES: Performed by: INTERNAL MEDICINE

## 2022-07-14 PROCEDURE — 25000003 PHARM REV CODE 250: Performed by: NURSE PRACTITIONER

## 2022-07-14 PROCEDURE — P9021 RED BLOOD CELLS UNIT: HCPCS | Performed by: FAMILY MEDICINE

## 2022-07-14 PROCEDURE — C9113 INJ PANTOPRAZOLE SODIUM, VIA: HCPCS | Performed by: NURSE PRACTITIONER

## 2022-07-14 PROCEDURE — 45378 PR COLONOSCOPY,DIAGNOSTIC: ICD-10-PCS | Mod: ,,, | Performed by: INTERNAL MEDICINE

## 2022-07-14 PROCEDURE — 99900035 HC TECH TIME PER 15 MIN (STAT)

## 2022-07-14 PROCEDURE — 37000009 HC ANESTHESIA EA ADD 15 MINS: Performed by: INTERNAL MEDICINE

## 2022-07-14 PROCEDURE — 63600175 PHARM REV CODE 636 W HCPCS: Performed by: NURSE ANESTHETIST, CERTIFIED REGISTERED

## 2022-07-14 PROCEDURE — 25000003 PHARM REV CODE 250: Performed by: INTERNAL MEDICINE

## 2022-07-14 RX ORDER — LIDOCAINE HCL/PF 100 MG/5ML
SYRINGE (ML) INTRAVENOUS
Status: DISCONTINUED | OUTPATIENT
Start: 2022-07-14 | End: 2022-07-14

## 2022-07-14 RX ORDER — PROPOFOL 10 MG/ML
VIAL (ML) INTRAVENOUS CONTINUOUS PRN
Status: DISCONTINUED | OUTPATIENT
Start: 2022-07-14 | End: 2022-07-14

## 2022-07-14 RX ORDER — HYDROCODONE BITARTRATE AND ACETAMINOPHEN 500; 5 MG/1; MG/1
TABLET ORAL
Status: DISCONTINUED | OUTPATIENT
Start: 2022-07-14 | End: 2022-07-19 | Stop reason: HOSPADM

## 2022-07-14 RX ORDER — DEXTROMETHORPHAN/PSEUDOEPHED 2.5-7.5/.8
DROPS ORAL
Status: COMPLETED | OUTPATIENT
Start: 2022-07-14 | End: 2022-07-14

## 2022-07-14 RX ORDER — PROPOFOL 10 MG/ML
VIAL (ML) INTRAVENOUS
Status: DISCONTINUED | OUTPATIENT
Start: 2022-07-14 | End: 2022-07-14

## 2022-07-14 RX ADMIN — SODIUM CHLORIDE: 0.9 INJECTION, SOLUTION INTRAVENOUS at 03:07

## 2022-07-14 RX ADMIN — PROPOFOL 100 MCG/KG/MIN: 10 INJECTION, EMULSION INTRAVENOUS at 03:07

## 2022-07-14 RX ADMIN — ATORVASTATIN CALCIUM 40 MG: 40 TABLET, FILM COATED ORAL at 09:07

## 2022-07-14 RX ADMIN — Medication 10 ML: at 06:07

## 2022-07-14 RX ADMIN — PANTOPRAZOLE SODIUM 40 MG: 40 INJECTION, POWDER, FOR SOLUTION INTRAVENOUS at 09:07

## 2022-07-14 RX ADMIN — Medication 10 ML: at 09:07

## 2022-07-14 RX ADMIN — NIFEDIPINE 60 MG: 30 TABLET, FILM COATED, EXTENDED RELEASE ORAL at 09:07

## 2022-07-14 RX ADMIN — LIDOCAINE HYDROCHLORIDE 50 MG: 20 INJECTION, SOLUTION INTRAVENOUS at 03:07

## 2022-07-14 RX ADMIN — SODIUM CHLORIDE: 0.9 INJECTION, SOLUTION INTRAVENOUS at 09:07

## 2022-07-14 RX ADMIN — SODIUM CHLORIDE: 0.9 INJECTION, SOLUTION INTRAVENOUS at 04:07

## 2022-07-14 RX ADMIN — PROPOFOL 20 MG: 10 INJECTION, EMULSION INTRAVENOUS at 03:07

## 2022-07-14 RX ADMIN — PROPOFOL 50 MG: 10 INJECTION, EMULSION INTRAVENOUS at 03:07

## 2022-07-14 RX ADMIN — SIMETHICONE 66.67 MG: 20 SUSPENSION/ DROPS ORAL at 03:07

## 2022-07-14 NOTE — CONSULTS
"  [facility]  Adult Nutrition  Consult Note    SUMMARY     Recommendations    Recommendation:   1. Advance diet as medically acceptable to CLD.  2. Addition of Chava BID to promote wound healing.   3. Monitor weight/labs.   4. RD to follow and monitor nutrition status    Goals:   Pt to receive nutrition by RD follow up    Nutrition Goal Status: new  Communication of RD Recs: other (comment) (POC)    Assessment and Plan    * GIB (gastrointestinal bleeding)  Contributing Nutrition Diagnosis  Inadequate oral intake    Related to (etiology):   GIB    Signs and Symptoms (as evidenced by):   Pt admitted with GIB, s/p EGD pending colonoscopy. Pt also with increased protein needs r/t wound healing, BLE wounds. S/p surgical intervention for veins 2 weeks PTA.      Interventions:  Collaboration with other providers  Modified Beverage- Chava BID    Nutrition Diagnosis Status:   New             Reason for Assessment    Reason For Assessment: consult (diabetic wounds)  Diagnosis: other (see comments) (GIB)  Relevant Medical History: lymphedema, HLD, CKD stage 4, TIA, morbid obesity, TIA, DMT2, kidney stone, nephrolitiasis, cholecystectomy, subtotal colectomy  Interdisciplinary Rounds: did not attend  General Information Comments: Pt NPO, pending colonoscopy 7/14. S/p EGD today, per MD note "no obvious source of bleeding  Some mild gastritis." Pt with CKD stage 4, and BLE wounds. PIV. Freddie Score: 15 NFPE not completed 2/2 pt with other providers  Nutrition Discharge Planning: d/c to be determined    Nutrition Risk Screen    Nutrition Risk Screen: other (see comments) (pt is diabetic and has wounds on bilateral feet)    Nutrition/Diet History    Food Preferences: CARMELO  Spiritual, Cultural Beliefs, Latter-day Practices, Values that Affect Care: no  Food Allergies: NKFA  Factors Affecting Nutritional Intake: NPO, altered gastrointestinal function    Anthropometrics    Temp: 97.5 °F (36.4 °C)  Height Method: Stated  Height: " "5' 7" (170.2 cm)  Height (inches): 67 in  Weight Method: Stated  Weight: 117.9 kg (260 lb)  Weight (lb): 260 lb  Ideal Body Weight (IBW), Female: 135 lb  % Ideal Body Weight, Female (lb): 192.59 %  BMI (Calculated): 40.7  BMI Grade: greater than 40 - morbid obesity       Lab/Procedures/Meds    Pertinent Labs Reviewed: reviewed  Pertinent Labs Comments: Cl 113, CO2 20, BUN 60, Cr 1.7, eGFR 28, Glu 182, Ca 8.5, total pro 5.7, ALT 9  Pertinent Medications Reviewed: reviewed  Pertinent Medications Comments: statin, nifedipine, pantoprazole, NaCl at 75 mL/hr      Estimated/Assessed Needs    Weight Used For Calorie Calculations: 117.9 kg (259 lb 14.8 oz)  Energy Calorie Requirements (kcal): 1687  Energy Need Method: [place]odaway-St Jeor (x 1.0 2/2 BMI > 40)  Protein Requirements: 71-95 (0.6-0.8 gm/kg 2/2 BMI > 40 and CKD stage 4)  Weight Used For Protein Calculations: 117.9 kg (259 lb 14.8 oz)     Estimated Fluid Requirement Method: RDA Method (or PER MD)  RDA Method (mL): 1687  CHO Requirement: 190 gm/day      Nutrition Prescription Ordered    Current Diet Order: NPO    Evaluation of Received Nutrient/Fluid Intake    IV Fluid (mL): 1800 (NaCl)  I/O: 1150/600  Energy Calories Required: not meeting needs  Protein Required: not meeting needs  Fluid Required: meeting needs  Comments: LBM 7/12  % Intake of Estimated Energy Needs: 0 - 25 %  % Meal Intake: NPO    Nutrition Risk    Level of Risk/Frequency of Follow-up:  (2x/week)       Monitor and Evaluation    Food and Nutrient Intake: energy intake, food and beverage intake  Food and Nutrient Adminstration: diet order  Knowledge/Beliefs/Attitudes: food and nutrition knowledge/skill  Physical Activity and Function: nutrition-related ADLs and IADLs  Anthropometric Measurements: weight, weight change, body mass index  Biochemical Data, Medical Tests and Procedures: electrolyte and renal panel, gastrointestinal profile, glucose/endocrine profile, lipid profile, inflammatory profile "       Nutrition Follow-Up    RD Follow-up?: Yes

## 2022-07-14 NOTE — PLAN OF CARE
SW met with pt at bedside to complete assessment. Pt has family member Diane at bedside with her.  Pt is AxO 3 and able to verbally answer assessment questions. Pt confirmed PCP and open to [facility] hospital follow up. Pt reports to have active HH.  At time of discharge pt plans to be transported back to her home via family.  Pt reports living alone however being independent. Pt reports feeling safe in her home and having an adult son who lives across the street and neighbors who check on one another. Pt reports being independent of ADL's and completes her own cooking and laundry. Pt reports to have motorized chair, cane, grab bars, and shower bench for DME. Pt reports never driving but always having spouse or family to assist. Pt now will have family to drive to and from follow up appointments or utilize medicaid transportation service. SW updated whiteboard with [facility] name and contact information. SW confirmed pt understanding of Observation unit and expected discharge plan. SW will continue to follow pt throughout care and assist with any discharge needs.         07/14/22 1226   Discharge Planning   Assessment Type Discharge Planning Brief Assessment   Resource/Environmental Concerns none   Support Systems Children;Friends/neighbors;Family members   Equipment Currently Used at Home cane, straight;power chair;grab bar;shower chair   Current Living Arrangements home/apartment/condo   Patient/Family Anticipates Transition to home   Patient/Family Anticipated Services at Transition none   DME Needed Upon Discharge  none   Discharge Plan A Home     Future Appointments   Date Time Provider Department Center   7/25/2022  1:00 PM Adrienne Chisholm MD [facility] [place]   8/19/2022  8:05 AM LAB, [facility] [place]   8/19/2022  8:30 AM SPECIMEN, JULIANE TRUONG SPECLAB [place]   8/23/2022  1:30 PM Katie Knott MD [facility] [place]   10/5/2022 10:30 AM Deedee Calderon MD [facility] ULISES [place] W    10/11/2022  1:15 PM Marlin Adams DPM [facility]  [phone]

## 2022-07-14 NOTE — PROGRESS NOTES
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), Benign essential hypertension (11/14/2012), Cataract, Chronic kidney disease (CKD), stage III (moderate) (9/12/2013), Diabetic peripheral neuropathy associated with type 2 diabetes mellitus (11/14/2014), Gait disorder, Hyperlipidemia, Iritis - Both Eyes (6/10/2013), Kidney stone, Lymphedema, Morbid obesity with BMI of 40.0-44.9, adult (2/18/2015), Nephrolithiasis (4/20/2016), NS (nuclear sclerosis) (4/1/2013), Nuclear sclerosis - Both Eyes (4/29/2013), Preseptal cellulitis - Right Eye (4/29/2013), Proliferative diabetic retinopathy - Both Eyes (4/29/2013), Proliferative diabetic retinopathy, both eyes (4/1/2013), PSC (posterior subcapsular cataract) - Both Eyes (4/29/2013), S/P hernia repair (12/19/2012), TIA (transient ischemic attack) (11/18/2014), Tinea pedis (7/24/2012), Type 2 diabetes mellitus with diabetic polyneuropathy, with long-term current use of insulin (9/18/2015), Type 2 diabetes mellitus with renal manifestations, controlled (12/12/2013), Type 2 diabetes, controlled, with moderate nonproliferative diabetic retinopathy without macular edema (9/17/2015), Ulcer of left lower extremity, limited to breakdown of skin (7/8/2015), Unspecified cerebral artery occlusion with cerebral infarction (11/16/2014), Unspecified venous (peripheral) insufficiency, Ureteral stone with hydronephrosis (1/27/2016), UTI (lower urinary tract infection), Vaginal infection, and 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    She presents today due  "to weakness and black stools. Patient reports having black stools since 07/22. She had a large bowel movement today and since then her stomach has been feeling "bloated". She reports significant weakness with this as well. She denies history of bleeding from the stomach in the past shortness of breath fevers chest pain or new pains today. Patient states that she underwent surgery for veins on her legs on 06/30/2022. She reports taking a baby aspirin and Plavix at home. Denies SOB, CP, nausea, vomiting, or abdominal pain.    In the ED: patient was occult blood positive, Hgb 6.7, CXR without acute processes. Hemodynamically stable otherwise. Admitted to [facility] Medicine for further evaluation and GI work up.      Overview/Hospital Course:  No notes on file    Interval History: awake and alert,   S/p EGD on 7/13 reports widely patent Schatzki ring and gastritis.   Appreciates GI plan for colonoscopy today.   H/H still low- transfuse with 1 prbc    Review of Systems   Constitutional:  Negative for fatigue.   Cardiovascular:  Negative for palpitations.   Gastrointestinal:  Negative for blood in stool.   Genitourinary:  Negative for dysuria.   Skin:  Negative for color change.   Neurological:  Positive for weakness.   Psychiatric/Behavioral:  Negative for agitation.    Objective:     Vital Signs (Most Recent):  Temp: 97.5 °F (36.4 °C) (07/14/22 1126)  Pulse: 65 (07/14/22 1126)  Resp: 18 (07/14/22 1126)  BP: (!) 140/64 (07/14/22 1126)  SpO2: 99 % (07/14/22 1126)   Vital Signs (24h Range):  Temp:  [96 °F (35.6 °C)-98.7 °F (37.1 °C)] 97.5 °F (36.4 °C)  Pulse:  [64-74] 65  Resp:  [18-22] 18  SpO2:  [95 %-100 %] 99 %  BP: (120-174)/(46-73) 140/64     Weight: 125.5 kg (276 lb 10.8 oz)  Body mass index is 43.33 kg/m².    Intake/Output Summary (Last 24 hours) at 7/14/2022 1224  Last data filed at 7/14/2022 0638  Gross per 24 hour   Intake 2872.28 ml   Output 900 ml   Net 1972.28 ml      Physical Exam  Vitals and nursing " note reviewed.   Constitutional:       General: She is not in acute distress.     Appearance: Normal appearance. She is obese. She is not ill-appearing or diaphoretic.   HENT:      Head: Normocephalic and atraumatic.      Mouth/Throat:      Mouth: Mucous membranes are dry.   Eyes:      Extraocular Movements: Extraocular movements intact.      Pupils: Pupils are equal, round, and reactive to light.   Cardiovascular:      Rate and Rhythm: Normal rate and regular rhythm.      Pulses: Normal pulses.      Heart sounds: Normal heart sounds.   Pulmonary:      Effort: Pulmonary effort is normal. No respiratory distress.      Breath sounds: Normal breath sounds.   Abdominal:      General: Bowel sounds are normal. There is no distension.      Palpations: Abdomen is soft.      Tenderness: There is no abdominal tenderness. There is no guarding.   Musculoskeletal:         General: No swelling. Normal range of motion.      Cervical back: Normal range of motion.   Skin:     General: Skin is warm.      Capillary Refill: Capillary refill takes 2 to 3 seconds.   Neurological:      General: No focal deficit present.      Mental Status: She is alert. Mental status is at baseline.   Psychiatric:         Mood and Affect: Mood normal.         Behavior: Behavior normal.         Thought Content: Thought content normal.       Significant Labs: A1C:   Recent Labs   Lab 01/18/22  0215 06/30/22  0934   HGBA1C 8.4* 8.4*     ABGs: No results for input(s): PH, PCO2, HCO3, POCSATURATED, BE, TOTALHB, COHB, METHB, O2HB, POCFIO2, PO2 in the last 48 hours.  Blood Culture: No results for input(s): LABBLOO in the last 48 hours.  BMP:   Recent Labs   Lab 07/14/22  0809   *      K 4.7   *   CO2 20*   BUN 51*   CREATININE 1.4   CALCIUM 8.2*     CBC:   Recent Labs   Lab 07/13/22  0809 07/13/22  1241 07/14/22  0809   WBC 4.76 4.74 2.84*  2.92*   HGB 5.4* 5.9* 6.7*  6.7*   HCT 17.1* 18.5* 20.8*  20.8*    278 263  256     Cardiac  Markers:   Recent Labs   Lab 07/12/22 2054   *     Lactic Acid: No results for input(s): LACTATE in the last 48 hours.  Lipase: No results for input(s): LIPASE in the last 48 hours.  Lipid Panel: No results for input(s): CHOL, HDL, LDLCALC, TRIG, CHOLHDL in the last 48 hours.  Magnesium: No results for input(s): MG in the last 48 hours.  Troponin:   Recent Labs   Lab 07/12/22 2054   TROPONINI 0.016     TSH: No results for input(s): TSH in the last 4320 hours.  Urine Culture: No results for input(s): LABURIN in the last 48 hours.  Urine Studies:   Recent Labs   Lab 07/13/22  0138   COLORU Colorless*   APPEARANCEUA Clear   PHUR 6.0   SPECGRAV 1.010   PROTEINUA Trace*   GLUCUA 4+*   KETONESU Negative   BILIRUBINUA Negative   OCCULTUA Negative   NITRITE Negative   UROBILINOGEN Negative   LEUKOCYTESUR 1+*   RBCUA 1   WBCUA 1   BACTERIA Rare   SQUAMEPITHEL 0       Significant Imaging: I have reviewed all pertinent imaging results/findings within the past 24 hours.      Assessment/Plan:      * GIB (gastrointestinal bleeding)  Anemia of chronic disease  -Presented with weakness and melena, takes ASA and Plavix; Hb 6.7 from baseline 10; hemodynamically stable  -Underwent surgery for veins in legs on 6/30/22  -CXR: No evidence of acute chest disease since 2021.  -Occult blood positive  -GI bleed Pathway initiated  -2 LBIV  -Orthostatics ordered  -Initiated on protonix 40mg IV bid  -Hold anticoagulation  -Trend CBC- trend  -Transfuse unit PRBCs to keep Hb > 7  -NPO  -GI consulted- s/p EGD on 7/13 reports widely patent Schatzki ring and gastritis and plan for colonoscopy today    Obesity, morbid (more than 100 lbs over ideal weight or BMI > 40)  -encourage lifestyle modifications (helathy diet, exercise)     (HFpEF) heart failure with preserved ejection fraction  No complaints of CP or SOB  Patient is identified as having Diastolic (HFpEF) heart failure that is Chronic. CHF is currently controlled. Latest ECHO  performed and demonstrates- Results for orders placed during the hospital encounter of 11/21/20    Echo Color Flow Doppler? Yes    Interpretation Summary  · Mild left atrial enlargement.  · There is left ventricular eccentric hypertrophy.  · The left ventricle is normal in size with normal systolic function. The estimated ejection fraction is 60%.  · Indeterminate diastolic function.  · Normal right ventricular size with normal right ventricular systolic function.  · The estimated PA systolic pressure is 31 mmHg.  · Normal central venous pressure (3 mmHg).  Continue CCB and monitor clinical status closely. Monitor on telemetry. Patient is off CHF pathway.  Monitor strict Is&Os and daily weights. Continue to stress to patient importance of self efficacy and  on diet for CHF. Last BNP reviewed- and noted below   Recent Labs   Lab 07/12/22 2054   *       PAOD (peripheral arterial occlusive disease)  Patient takes ASA-hold with GIB       Wheelchair dependent  -fall precautions      Type 2 diabetes mellitus with diabetic polyneuropathy, with long-term current use of insulin  Hemoglobin A1C   Date Value Ref Range Status   06/30/2022 8.4 (H) 4.0 - 5.6 % Final   -Serum glucose on admit 295  -Initiate on detemir 10u daily   -LDSSI with ACCUcheck Q6  -NPO with GI work up  -Hypoglycemia protocol PRN  -Monitor closely and adjust insulin regimen as clinically indicated to achieve levels of 140-180 during hospitalization      Stage 4 chronic kidney disease  Creatine stable for now. BMP reviewed- noted Estimated Creatinine Clearance: 33.6 mL/min (A) (based on SCr of 1.8 mg/dL (H)). according to latest data. Monitor UOP and serial BMP and adjust therapy as needed. Renally dose meds.      Essential hypertension  -Chronic, uncontrolled.  Latest blood pressure and vitals reviewed-   Temp:  [97.5 °F (36.4 °C)-97.8 °F (36.6 °C)]   Pulse:  [74-78]   Resp:  [18]   BP: (143-155)/(60-70)   SpO2:  [97 %-100 %] .   Home meds  for hypertension were reviewed and noted below.   Hypertension Medications             NIFEdipine (PROCARDIA-XL) 60 MG (OSM) 24 hr tablet Take 1 tablet (60 mg total) by mouth once daily.      -While in the hospital, will manage blood pressure as follows; Continue home antihypertensive regimen  -Will utilize p.r.n. blood pressure medication only if patient's blood pressure greater than  180/110 and she develops symptoms such as worsening chest pain or shortness of breath.      Hyperlipidemia LDL goal <100   Patient is chronically on statin.will continue for now. Monitor clinically. Last LDL was   Lab Results   Component Value Date    LDLCALC 104.8 06/30/2022            VTE Risk Mitigation (From admission, onward)         Ordered     Place sequential compression device  Until discontinued         07/13/22 0004                Discharge Planning   GABE: 7/14/2022     Code Status: Prior   Is the patient medically ready for discharge?:     Reason for patient still in hospital (select all that apply): Patient trending condition  Discharge Plan A: Home                  Su Maddox MD  Department of Hospital Medicine   [place] [facility]

## 2022-07-14 NOTE — ANESTHESIA PREPROCEDURE EVALUATION
07/14/2022  Sherin Ortiz is a 77 y.o., female admitted with melena/anemia for colonoscopy under MAC. EGD 7/13/22 [facility]. BMI 43; 7/14/22 Hb 6.7, 1 unit started at 1330      Patient Active Problem List   Diagnosis    Bilateral leg edema    Hyperlipidemia LDL goal <100    Essential hypertension    Debility    PSC (posterior subcapsular cataract) - Both Eyes    Nuclear sclerosis - Both Eyes    Asteroid hyalosis - Left Eye    Stage 4 chronic kidney disease    Inability to walk    Morbid obesity with body mass index (BMI) greater than or equal to 50    Type 2 diabetes mellitus with diabetic polyneuropathy, with long-term current use of insulin    Anemia of chronic disease    Hypoalbuminemia    Wheelchair dependent    Dermatitis, stasis    Hyperparathyroidism    Aortic atherosclerosis    PAOD (peripheral arterial occlusive disease)    Hx of transient ischemic attack (TIA)    (HFpEF) heart failure with preserved ejection fraction    Goals of care, counseling/discussion    Dermatitis associated with moisture    Lymphedema    Closed nondisplaced fracture of left calcaneus    Osteopenia    Charcot ankle, left    Benign hypertensive heart and kidney disease with HF and CKD    Type 2 diabetes mellitus with hypoglycemia without coma, with long-term current use of insulin    Unable to bear weight    UTI due to extended-spectrum beta lactamase (ESBL) producing Escherichia coli    Venous ulcer of both lower extremities with varicose veins    Ulcer of great toe, left, limited to breakdown of skin    Obesity, morbid (more than 100 lbs over ideal weight or BMI > 40)    Skin ulcer of left great toe with fat layer exposed    Venous insufficiency of both lower extremities    Venous reflux    Pyelonephritis    Adrenal nodule    Obesity, diabetes, and hypertension syndrome    Avulsed  toenail, initial encounter    Toenail deformity    Venous ulcer, limited to breakdown of skin    GIB (gastrointestinal bleeding)       Review of patient's allergies indicates:   Allergen Reactions    Penicillins Hives     Other reaction(s): Hives  Patient has received cefdinir, ceftriaxone, cefazolin and cefepime in the past with no documented reactions    Sulfa (sulfonamide antibiotics) Other (See Comments)     Shakes, pt states her doctor told her the shakes were possibly caused by an allergy to sulfa            Past Surgical History:   Procedure Laterality Date    ABLATION Bilateral 6/23/2022    Procedure: Ablation;  Surgeon: Vahid Farfan MD;  Location: [facility] CATH LAB/EP;  Service: Cardiology;  Laterality: Bilateral;    APPENDECTOMY      CATARACT EXTRACTION W/  INTRAOCULAR LENS IMPLANT Left 5/21/2013    CATARACT EXTRACTION W/  INTRAOCULAR LENS IMPLANT Right 6/4/2013    CHOLECYSTECTOMY      COLONOSCOPY  12/22/2005    normal    ESOPHAGOGASTRODUODENOSCOPY  12/21/2015    hiatal hernia, Schatzki ring    ESOPHAGOGASTRODUODENOSCOPY N/A 7/13/2022    Procedure: EGD (ESOPHAGOGASTRODUODENOSCOPY);  Surgeon: Kannan Mace MD;  Location: [facility] ENDO;  Service: Endoscopy;  Laterality: N/A;    EYE SURGERY Bilateral 2008    laser surgery both eyes    NASAL SEPTUM SURGERY      SUBTOTAL COLECTOMY  12/13/2012    transverse colon, for incarcerated umbilical hernia, Dr. Kat Bower             Vital Signs Range (Last 24H):  Temp:  [35.6 °C (96 °F)-36.8 °C (98.2 °F)]   Pulse:  [64-73]   Resp:  [18-22]   BP: (120-175)/(46-73)   SpO2:  [95 %-100 %]       CBC:   Recent Labs     07/13/22  1241 07/14/22  0809   WBC 4.74 2.84*  2.92*   RBC 2.02* 2.28*  2.32*   HGB 5.9* 6.7*  6.7*   HCT 18.5* 20.8*  20.8*    263  256   MCV 92 91  90   MCH 29.2 29.4  28.9   MCHC 31.9* 32.2  32.2       CMP:   Recent Labs     07/13/22  0509 07/14/22  0809    142   K 4.9 4.7   * 113*   CO2 20* 20*   BUN 60*  51*   CREATININE 1.7* 1.4   * 138*   CALCIUM 8.5* 8.2*   ALBUMIN 2.5* 2.4*   PROT 5.7* 5.5*   ALKPHOS 132 128   ALT 9* 11   AST 14 16   BILITOT 0.5 0.5       INR  No results for input(s): PT, INR, PROTIME, APTT in the last 72 hours.      EKG:  Vent. Rate : 073 BPM     Atrial Rate : 073 BPM     P-R Int : 304 ms          QRS Dur : 174 ms      QT Int : 464 ms       P-R-T Axes : 063 -51 043 degrees     QTc Int : 511 ms    Sinus rhythm with 1st degree A-V block  Left atrial abnormality/enlargement  Right bundle branch block  Left anterior fascicular block  Bifascicular block   Abnormal ECG  When compared with ECG of 06-JAN-2018 15:11,  No significant change was found  Confirmed by PIPPA MACEDO MD ([id number]) on 1/22/2018 11:36:11 AM    2D Echo:  Technically difficult study    1 - Mild left ventricular enlargement.     2 - Normal left ventricular systolic function (EF 60-65%).     3 - Right ventricular enlargement with normal systolic function.     4 - Grade I left ventricular diastolic dysfunction.     5 - Biatrial enlargement.     6 - Elevated central venous pressure.     This document has been electronically    SIGNED BY: Oliver Marquez MD On: 11/17/2014 16:00      Anesthesia Evaluation    I have reviewed the Patient Summary Reports.    I have reviewed the Nursing Notes. I have reviewed the NPO Status.   I have reviewed the Medications.     Review of Systems  Anesthesia Hx:  No problems with previous Anesthesia    Social:  Former Smoker    Hematology/Oncology:     Oncology Normal    -- Anemia:   EENT/Dental:EENT/Dental Normal   Cardiovascular:   Hypertension PVD hyperlipidemia    Pulmonary:  Pulmonary Normal    Renal/:   Chronic Renal Disease, CRI    Hepatic/GI:  Hepatic/GI Normal    Musculoskeletal:  Musculoskeletal Normal    Neurological:   TIA,    Endocrine:   Diabetes, poorly controlled, type 2    Dermatological:   left hand cellulits   Psych:  Psychiatric Normal           Physical Exam  General:  Morbid Obesity       Airway/Jaw/Neck:  Airway Findings: Mouth Opening: Normal   Tongue: Normal   General Airway Assessment: Adult Mallampati: I  TM Distance: Normal, at least 6 cm   Jaw/Neck Findings:  Neck ROM: Normal ROM      Eyes/Ears/Nose:  EYES/EARS/NOSE FINDINGS: Normal   Dental:  Dental Findings: Edentulous     Chest/Lungs:  Chest/Lungs Findings: Clear to auscultation      Heart/Vascular:  Heart Findings: Rate: Normal  Rhythm: Regular Rhythm  Heart murmur: negative      Skin:  Venous static changes ble with weeping wounds; left hand wrapped   Mental Status:  Mental Status Findings:  Cooperative, Alert and Oriented         Anesthesia Plan  Type of Anesthesia, risks & benefits discussed:  Anesthesia Type:  Gen ETT, MAC    Patient's Preference:   Plan Factors:          Intra-op Monitoring Plan: standard ASA monitors  Intra-op Monitoring Plan Comments:   Post Op Pain Control Plan:   Post Op Pain Control Plan Comments:     Induction:    Beta Blocker:         Informed Consent: Informed consent signed with the Patient and all parties understand the risks and agree with anesthesia plan.  All questions answered.  Anesthesia consent signed with patient.  ASA Score: 3     Day of Surgery Review of History & Physical:              Ready For Surgery From Anesthesia Perspective.           Physical Exam  General: Morbid Obesity    Airway:  Mallampati: I   Mouth Opening: Normal  TM Distance: Normal, at least 6 cm  Tongue: Normal  Neck ROM: Normal ROM    Dental:  Edentulous    Chest/Lungs:  Clear to auscultation    Heart:  Rate: Normal  Rhythm: Regular Rhythm          Anesthesia Plan  Type of Anesthesia, risks & benefits discussed:    Anesthesia Type: Gen ETT, MAC  Intra-op Monitoring Plan: standard ASA monitors  Informed Consent: Informed consent signed with the Patient and all parties understand the risks and agree with anesthesia plan.  All questions answered.   ASA Score: 3    Ready For Surgery From Anesthesia Perspective.       .

## 2022-07-14 NOTE — ASSESSMENT & PLAN NOTE
Anemia of chronic disease  -Presented with weakness and melena, takes ASA and Plavix; Hb 6.7 from baseline 10; hemodynamically stable  -Underwent surgery for veins in legs on 6/30/22  -CXR: No evidence of acute chest disease since 2021.  -Occult blood positive  -GI bleed Pathway initiated  -2 LBIV  -Orthostatics ordered  -Initiated on protonix 40mg IV bid  -Hold anticoagulation  -Trend CBC- trend  -Transfuse unit PRBCs to keep Hb > 7  -NPO  -GI consulted- s/p EGD on 7/13 reports widely patent Schatzki ring and gastritis and plan for colonoscopy today

## 2022-07-14 NOTE — PLAN OF CARE
POC reviewed with patient and daughter regarding preparation for Colonoscopy today. Patient was given half of Golytely and is now actively drinking second half. Rt AC 20g infusing NS at 75cc/hr. Patient had 3 episodes of large watery, black stools. Lt wrist 22g SL and both dressing are c/d/I.  IVP Protonix injection administered per MAR. Patient vitals signs are stable in the setting of H/H 5.9/18.5 and asymptomatic after receiving 3 units of PRBCs after EGD procedure. Patient denies any pain, dizziness or SOB.     Problem: Bleeding (Gastrointestinal Bleeding)  Goal: Hemostasis  Outcome: Ongoing, Progressing     Problem: Adult Inpatient Plan of Care  Goal: Plan of Care Review  Outcome: Ongoing, Progressing

## 2022-07-14 NOTE — TREATMENT PLAN
[facility] RN met with patient and pt's family at bedside regarding [facility] follow up upon discharge. Pt agreeable to hospital follow up .RN informed pt of scheduled appointment and that appointment will also appear on d/c AVS. Patient informed to bring portable home O2 if used and all medication bottles to P[facility] follow up appointment.  [facility] information handout, appointment card and folder provided to patient. Pt states transportation service will provide transportation to appointment.    Patient Contact info:[phone]     Person providing transportation contact info: transportation service    Barriers to attending [facility] visit: transportation transportation    Future Appointments   Date Time Provider Department Center   7/25/2022  1:00 PM Adrienne Chisholm MD [facility] IMPRI [facility]   8/19/2022  8:05 AM [facility] [place]   8/19/2022  [facility] [place]   8/23/2022  1:30 PM Katie Knott MD [facility] MAURICIO [facility]   10/5/2022 10:30 AM Deedee Calderon MD [facility] ULISES [facility] PCW   10/11/2022  1:15 PM Marlin Adams DPM [facility] RUBY [facility]

## 2022-07-14 NOTE — ANESTHESIA POSTPROCEDURE EVALUATION
Anesthesia Post Evaluation    Patient: Sherin Ortiz    Procedure(s) Performed: Procedure(s) (LRB):  COLONOSCOPY (N/A)    Final Anesthesia Type: MAC      Patient location during evaluation: GI PACU  Patient participation: Yes- Able to Participate  Level of consciousness: awake and alert  Post-procedure vital signs: reviewed and stable  Pain management: adequate  Airway patency: patent    PONV status at discharge: No PONV  Anesthetic complications: no      Cardiovascular status: blood pressure returned to baseline and hemodynamically stable  Respiratory status: unassisted, spontaneous ventilation and room air  Hydration status: euvolemic  Follow-up not needed.          Vitals Value Taken Time   /53 07/14/22 1600   Temp 98 07/14/22 1600   Pulse 58 07/14/22 1600   Resp 14 07/14/22 1600   SpO2 100 07/14/22 1600         No case tracking events are documented in the log.      Pain/Phillip Score: Phillip Score: 9 (7/13/2022  3:24 PM)

## 2022-07-14 NOTE — ASSESSMENT & PLAN NOTE
Contributing Nutrition Diagnosis  Inadequate oral intake    Related to (etiology):   GIB    Signs and Symptoms (as evidenced by):   Pt admitted with GIB, s/p EGD pending colonoscopy. Pt also with increased protein needs r/t wound healing, BLE wounds. S/p surgical intervention for veins 2 weeks PTA.      Interventions:  Collaboration with other providers  Modified Beverage- Chava BID    Nutrition Diagnosis Status:   Continues

## 2022-07-14 NOTE — SUBJECTIVE & OBJECTIVE
Interval History: awake and alert,   S/p EGD on 7/13 reports widely patent Schatzki ring and gastritis.   Appreciates GI plan for colonoscopy today.   H/H still low- transfuse with 1 prbc    Review of Systems   Constitutional:  Negative for fatigue.   Cardiovascular:  Negative for palpitations.   Gastrointestinal:  Negative for blood in stool.   Genitourinary:  Negative for dysuria.   Skin:  Negative for color change.   Neurological:  Positive for weakness.   Psychiatric/Behavioral:  Negative for agitation.    Objective:     Vital Signs (Most Recent):  Temp: 97.5 °F (36.4 °C) (07/14/22 1126)  Pulse: 65 (07/14/22 1126)  Resp: 18 (07/14/22 1126)  BP: (!) 140/64 (07/14/22 1126)  SpO2: 99 % (07/14/22 1126)   Vital Signs (24h Range):  Temp:  [96 °F (35.6 °C)-98.7 °F (37.1 °C)] 97.5 °F (36.4 °C)  Pulse:  [64-74] 65  Resp:  [18-22] 18  SpO2:  [95 %-100 %] 99 %  BP: (120-174)/(46-73) 140/64     Weight: 125.5 kg (276 lb 10.8 oz)  Body mass index is 43.33 kg/m².    Intake/Output Summary (Last 24 hours) at 7/14/2022 1224  Last data filed at 7/14/2022 0638  Gross per 24 hour   Intake 2872.28 ml   Output 900 ml   Net 1972.28 ml      Physical Exam  Vitals and nursing note reviewed.   Constitutional:       General: She is not in acute distress.     Appearance: Normal appearance. She is obese. She is not ill-appearing or diaphoretic.   HENT:      Head: Normocephalic and atraumatic.      Mouth/Throat:      Mouth: Mucous membranes are dry.   Eyes:      Extraocular Movements: Extraocular movements intact.      Pupils: Pupils are equal, round, and reactive to light.   Cardiovascular:      Rate and Rhythm: Normal rate and regular rhythm.      Pulses: Normal pulses.      Heart sounds: Normal heart sounds.   Pulmonary:      Effort: Pulmonary effort is normal. No respiratory distress.      Breath sounds: Normal breath sounds.   Abdominal:      General: Bowel sounds are normal. There is no distension.      Palpations: Abdomen is soft.       Tenderness: There is no abdominal tenderness. There is no guarding.   Musculoskeletal:         General: No swelling. Normal range of motion.      Cervical back: Normal range of motion.   Skin:     General: Skin is warm.      Capillary Refill: Capillary refill takes 2 to 3 seconds.   Neurological:      General: No focal deficit present.      Mental Status: She is alert. Mental status is at baseline.   Psychiatric:         Mood and Affect: Mood normal.         Behavior: Behavior normal.         Thought Content: Thought content normal.       Significant Labs: A1C:   Recent Labs   Lab 01/18/22  0215 06/30/22  0934   HGBA1C 8.4* 8.4*     ABGs: No results for input(s): PH, PCO2, HCO3, POCSATURATED, BE, TOTALHB, COHB, METHB, O2HB, POCFIO2, PO2 in the last 48 hours.  Blood Culture: No results for input(s): LABBLOO in the last 48 hours.  BMP:   Recent Labs   Lab 07/14/22  0809   *      K 4.7   *   CO2 20*   BUN 51*   CREATININE 1.4   CALCIUM 8.2*     CBC:   Recent Labs   Lab 07/13/22  0809 07/13/22  1241 07/14/22  0809   WBC 4.76 4.74 2.84*  2.92*   HGB 5.4* 5.9* 6.7*  6.7*   HCT 17.1* 18.5* 20.8*  20.8*    278 263  256     Cardiac Markers:   Recent Labs   Lab 07/12/22 2054   *     Lactic Acid: No results for input(s): LACTATE in the last 48 hours.  Lipase: No results for input(s): LIPASE in the last 48 hours.  Lipid Panel: No results for input(s): CHOL, HDL, LDLCALC, TRIG, CHOLHDL in the last 48 hours.  Magnesium: No results for input(s): MG in the last 48 hours.  Troponin:   Recent Labs   Lab 07/12/22 2054   TROPONINI 0.016     TSH: No results for input(s): TSH in the last 4320 hours.  Urine Culture: No results for input(s): LABURIN in the last 48 hours.  Urine Studies:   Recent Labs   Lab 07/13/22  0138   COLORU Colorless*   APPEARANCEUA Clear   PHUR 6.0   SPECGRAV 1.010   PROTEINUA Trace*   GLUCUA 4+*   KETONESU Negative   BILIRUBINUA Negative   OCCULTUA Negative   NITRITE Negative    UROBILINOGEN Negative   LEUKOCYTESUR 1+*   RBCUA 1   WBCUA 1   BACTERIA Rare   SQUAMEPITHEL 0       Significant Imaging: I have reviewed all pertinent imaging results/findings within the past 24 hours.

## 2022-07-14 NOTE — [employer] PATIENT INSTRUCTIONS
Discharge Summary/Instructions after an Endoscopic Procedure  Patient Name: Sherin Ortiz  Patient MRN: [id number]  Patient YOB: 1945 Thursday, July 14, 2022  Kannan Mace MD  Dear patient,  As a result of recent federal legislation (The Federal Cures Act), you may   receive lab or pathology results from your procedure in your [facility]   account before your physician is able to contact you. Your physician or   their representative will relay the results to you with their   recommendations at their soonest availability.  Thank you,  Your health is very important to us during the Covid Crisis. Following your   procedure today, you will receive a daily text for 2 weeks asking about   signs or symptoms of Covid 19.  Please respond to this text when you   receive it so we can follow up and keep you as safe as possible.   RESTRICTIONS:  During your procedure today, you received medications for sedation.  These   medications may affect your judgment, balance and coordination.  Therefore,   for 24 hours, you have the following restrictions:   - DO NOT drive a car, operate machinery, make legal/financial decisions,   sign important papers or drink alcohol.    ACTIVITY:  Today: no heavy lifting, straining or running due to procedural   sedation/anesthesia.  The following day: return to full activity including work.  DIET:  Eat and drink normally unless instructed otherwise.     TREATMENT FOR COMMON SIDE EFFECTS:  - Mild abdominal pain, nausea, belching, bloating or excessive gas:  rest,   eat lightly and use a heating pad.  - Sore Throat: treat with throat lozenges and/or gargle with warm salt   water.  - Because air was used during the procedure, expelling large amounts of air   from your rectum or belching is normal.  - If a bowel prep was taken, you may not have a bowel movement for 1-3 days.    This is normal.  SYMPTOMS TO WATCH FOR AND REPORT TO YOUR PHYSICIAN:  1. Abdominal pain or bloating, other than gas  cramps.  2. Chest pain.  3. Back pain.  4. Signs of infection such as: chills or fever occurring within 24 hours   after the procedure.  5. Rectal bleeding, which would show as bright red, maroon, or black stools.   (A tablespoon of blood from the rectum is not serious, especially if   hemorrhoids are present.)  6. Vomiting.  7. Weakness or dizziness.  GO DIRECTLY TO THE NEAREST EMERGENCY ROOM IF YOU HAVE ANY OF THE FOLLOWING:      Difficulty breathing              Chills and/or fever over 101 F   Persistent vomiting and/or vomiting blood   Severe abdominal pain   Severe chest pain   Black, tarry stools   Bleeding- more than one tablespoon   Any other symptom or condition that you feel may need urgent attention  Your doctor recommends these additional instructions:  If any biopsies were taken, your doctors clinic will contact you in 1 to 2   weeks with any results.  - Return patient to [facility] for ongoing care.   - Continue present medications.   - No repeat colonoscopy due to age.   - Plan video capsule endoscopy tomorrow  - Resume previous diet.  For questions, problems or results please call your physician - Kannan Mace MD.  EMERGENCY PHONE NUMBER: [phone],  LAB RESULTS: [phone]  IF A COMPLICATION OR EMERGENCY SITUATION ARISES AND YOU ARE UNABLE TO REACH   YOUR PHYSICIAN - GO DIRECTLY TO THE EMERGENCY ROOM.  Kannan Mace MD  7/14/2022 3:56:21 PM  This report has been verified and signed electronically.  Dear patient,  As a result of recent federal legislation (The Federal Cures Act), you may   receive lab or pathology results from your procedure in your [facility]   account before your physician is able to contact you. Your physician or   their representative will relay the results to you with their   recommendations at their soonest availability.  Thank you,  [employer]OVATION

## 2022-07-14 NOTE — PLAN OF CARE
Recommendation:   1. Advance diet as medically acceptable to CLD.  2. Addition of Chava BID to promote wound healing.   3. Monitor weight/labs.   4. RD to follow and monitor nutrition status    Goals:   Pt to receive nutrition by RD follow up    Nutrition Goal Status: new  Communication of RD Recs: other (comment) (POC)      Problem: Impaired Wound Healing  Goal: Optimal Wound Healing  Outcome: Ongoing, Progressing

## 2022-07-14 NOTE — PLAN OF CARE
Gi update    Colonoscopy negative for bleeding.  Old blood upstream in TI    Will plan video capsule endoscopy tomorrow    Ok to advance diet   Npo past Mn

## 2022-07-14 NOTE — PLAN OF CARE
Report called to nurse Yoon. Updated on status and MD findings and plan. Questions answered. VSS. No distress noted.

## 2022-07-15 LAB
ABO GROUP BLD: NORMAL
ALBUMIN SERPL BCP-MCNC: 2.2 G/DL (ref 3.5–5.2)
ALP SERPL-CCNC: 124 U/L (ref 55–135)
ALT SERPL W/O P-5'-P-CCNC: 10 U/L (ref 10–44)
ANION GAP SERPL CALC-SCNC: 7 MMOL/L (ref 8–16)
AST SERPL-CCNC: 15 U/L (ref 10–40)
BASOPHILS # BLD AUTO: 0.02 K/UL (ref 0–0.2)
BASOPHILS NFR BLD: 0.6 % (ref 0–1.9)
BILIRUB SERPL-MCNC: 0.4 MG/DL (ref 0.1–1)
BLD GP AB SCN CELLS X3 SERPL QL: NORMAL
BLD PROD TYP BPU: NORMAL
BLD PROD TYP BPU: NORMAL
BLOOD UNIT EXPIRATION DATE: NORMAL
BLOOD UNIT EXPIRATION DATE: NORMAL
BLOOD UNIT TYPE CODE: 6200
BLOOD UNIT TYPE CODE: 6200
BLOOD UNIT TYPE: NORMAL
BLOOD UNIT TYPE: NORMAL
BUN SERPL-MCNC: 48 MG/DL (ref 8–23)
CALCIUM SERPL-MCNC: 7.9 MG/DL (ref 8.7–10.5)
CHLORIDE SERPL-SCNC: 115 MMOL/L (ref 95–110)
CO2 SERPL-SCNC: 19 MMOL/L (ref 23–29)
CODING SYSTEM: NORMAL
CODING SYSTEM: NORMAL
CREAT SERPL-MCNC: 1.4 MG/DL (ref 0.5–1.4)
DIFFERENTIAL METHOD: ABNORMAL
DISPENSE STATUS: NORMAL
DISPENSE STATUS: NORMAL
EOSINOPHIL # BLD AUTO: 0.1 K/UL (ref 0–0.5)
EOSINOPHIL NFR BLD: 2.6 % (ref 0–8)
ERYTHROCYTE [DISTWIDTH] IN BLOOD BY AUTOMATED COUNT: 16.1 % (ref 11.5–14.5)
EST. GFR  (AFRICAN AMERICAN): 41 ML/MIN/1.73 M^2
EST. GFR  (NON AFRICAN AMERICAN): 36 ML/MIN/1.73 M^2
GLUCOSE SERPL-MCNC: 153 MG/DL (ref 70–110)
HCT VFR BLD AUTO: 21 % (ref 37–48.5)
HGB BLD-MCNC: 6.7 G/DL (ref 12–16)
IMM GRANULOCYTES # BLD AUTO: 0.01 K/UL (ref 0–0.04)
IMM GRANULOCYTES NFR BLD AUTO: 0.3 % (ref 0–0.5)
LYMPHOCYTES # BLD AUTO: 1 K/UL (ref 1–4.8)
LYMPHOCYTES NFR BLD: 29.1 % (ref 18–48)
MCH RBC QN AUTO: 29.5 PG (ref 27–31)
MCHC RBC AUTO-ENTMCNC: 31.9 G/DL (ref 32–36)
MCV RBC AUTO: 93 FL (ref 82–98)
MONOCYTES # BLD AUTO: 0.3 K/UL (ref 0.3–1)
MONOCYTES NFR BLD: 9.8 % (ref 4–15)
NEUTROPHILS # BLD AUTO: 2 K/UL (ref 1.8–7.7)
NEUTROPHILS NFR BLD: 57.6 % (ref 38–73)
NRBC BLD-RTO: 0 /100 WBC
PLATELET # BLD AUTO: 225 K/UL (ref 150–450)
PMV BLD AUTO: 9.1 FL (ref 9.2–12.9)
POCT GLUCOSE: 185 MG/DL (ref 70–110)
POCT GLUCOSE: 200 MG/DL (ref 70–110)
POCT GLUCOSE: 204 MG/DL (ref 70–110)
POCT GLUCOSE: 216 MG/DL (ref 70–110)
POTASSIUM SERPL-SCNC: 4.7 MMOL/L (ref 3.5–5.1)
PROT SERPL-MCNC: 5 G/DL (ref 6–8.4)
RBC # BLD AUTO: 2.27 M/UL (ref 4–5.4)
RH BLD: NORMAL
SODIUM SERPL-SCNC: 141 MMOL/L (ref 136–145)
TRANS ERYTHROCYTES VOL PATIENT: NORMAL ML
TRANS ERYTHROCYTES VOL PATIENT: NORMAL ML
WBC # BLD AUTO: 3.47 K/UL (ref 3.9–12.7)

## 2022-07-15 PROCEDURE — 85025 COMPLETE CBC W/AUTO DIFF WBC: CPT | Mod: 91 | Performed by: FAMILY MEDICINE

## 2022-07-15 PROCEDURE — C9113 INJ PANTOPRAZOLE SODIUM, VIA: HCPCS | Performed by: NURSE PRACTITIONER

## 2022-07-15 PROCEDURE — 36415 COLL VENOUS BLD VENIPUNCTURE: CPT | Performed by: NURSE PRACTITIONER

## 2022-07-15 PROCEDURE — 86901 BLOOD TYPING SEROLOGIC RH(D): CPT | Performed by: FAMILY MEDICINE

## 2022-07-15 PROCEDURE — 80053 COMPREHEN METABOLIC PANEL: CPT | Performed by: NURSE PRACTITIONER

## 2022-07-15 PROCEDURE — 11000001 HC ACUTE MED/SURG PRIVATE ROOM

## 2022-07-15 PROCEDURE — A4216 STERILE WATER/SALINE, 10 ML: HCPCS | Performed by: NURSE PRACTITIONER

## 2022-07-15 PROCEDURE — 63600175 PHARM REV CODE 636 W HCPCS: Performed by: INTERNAL MEDICINE

## 2022-07-15 PROCEDURE — 99232 PR SUBSEQUENT HOSPITAL CARE,LEVL II: ICD-10-PCS | Mod: ,,, | Performed by: INTERNAL MEDICINE

## 2022-07-15 PROCEDURE — 36415 COLL VENOUS BLD VENIPUNCTURE: CPT | Performed by: FAMILY MEDICINE

## 2022-07-15 PROCEDURE — 99900035 HC TECH TIME PER 15 MIN (STAT)

## 2022-07-15 PROCEDURE — 99232 SBSQ HOSP IP/OBS MODERATE 35: CPT | Mod: ,,, | Performed by: INTERNAL MEDICINE

## 2022-07-15 PROCEDURE — P9021 RED BLOOD CELLS UNIT: HCPCS | Performed by: FAMILY MEDICINE

## 2022-07-15 PROCEDURE — 91110 GI TRC IMG INTRAL ESOPH-ILE: CPT | Performed by: INTERNAL MEDICINE

## 2022-07-15 PROCEDURE — 25000003 PHARM REV CODE 250: Performed by: NURSE PRACTITIONER

## 2022-07-15 PROCEDURE — 86900 BLOOD TYPING SEROLOGIC ABO: CPT | Performed by: FAMILY MEDICINE

## 2022-07-15 PROCEDURE — 86920 COMPATIBILITY TEST SPIN: CPT | Performed by: FAMILY MEDICINE

## 2022-07-15 PROCEDURE — 86850 RBC ANTIBODY SCREEN: CPT | Performed by: FAMILY MEDICINE

## 2022-07-15 PROCEDURE — 94761 N-INVAS EAR/PLS OXIMETRY MLT: CPT

## 2022-07-15 PROCEDURE — 85025 COMPLETE CBC W/AUTO DIFF WBC: CPT | Performed by: NURSE PRACTITIONER

## 2022-07-15 PROCEDURE — 63600175 PHARM REV CODE 636 W HCPCS: Performed by: NURSE PRACTITIONER

## 2022-07-15 RX ORDER — METOCLOPRAMIDE HYDROCHLORIDE 5 MG/ML
10 INJECTION INTRAMUSCULAR; INTRAVENOUS EVERY 4 HOURS
Status: COMPLETED | OUTPATIENT
Start: 2022-07-15 | End: 2022-07-15

## 2022-07-15 RX ORDER — HYDROCODONE BITARTRATE AND ACETAMINOPHEN 500; 5 MG/1; MG/1
TABLET ORAL
Status: DISCONTINUED | OUTPATIENT
Start: 2022-07-15 | End: 2022-07-19 | Stop reason: HOSPADM

## 2022-07-15 RX ADMIN — METOCLOPRAMIDE 10 MG: 5 INJECTION, SOLUTION INTRAMUSCULAR; INTRAVENOUS at 10:07

## 2022-07-15 RX ADMIN — METOCLOPRAMIDE 10 MG: 5 INJECTION, SOLUTION INTRAMUSCULAR; INTRAVENOUS at 08:07

## 2022-07-15 RX ADMIN — SODIUM CHLORIDE: 0.9 INJECTION, SOLUTION INTRAVENOUS at 09:07

## 2022-07-15 RX ADMIN — INSULIN ASPART 2 UNITS: 100 INJECTION, SOLUTION INTRAVENOUS; SUBCUTANEOUS at 05:07

## 2022-07-15 RX ADMIN — SODIUM CHLORIDE: 0.9 INJECTION, SOLUTION INTRAVENOUS at 10:07

## 2022-07-15 RX ADMIN — Medication 10 ML: at 09:07

## 2022-07-15 RX ADMIN — PANTOPRAZOLE SODIUM 40 MG: 40 INJECTION, POWDER, FOR SOLUTION INTRAVENOUS at 08:07

## 2022-07-15 RX ADMIN — Medication 10 ML: at 02:07

## 2022-07-15 RX ADMIN — ATORVASTATIN CALCIUM 40 MG: 40 TABLET, FILM COATED ORAL at 09:07

## 2022-07-15 RX ADMIN — Medication 10 ML: at 05:07

## 2022-07-15 RX ADMIN — PANTOPRAZOLE SODIUM 40 MG: 40 INJECTION, POWDER, FOR SOLUTION INTRAVENOUS at 09:07

## 2022-07-15 NOTE — SUBJECTIVE & OBJECTIVE
Interval History: awake and alert, no new complaint  S/p EGD on 7/13 reports widely patent Schatzki ring and gastritis.   S/p  for colonoscopy on 7/14 with melena throughout the colon no bleeding source identified. Plan for capsule endoscopy today.   H/H still low- transfuse with 1 prbc again 7/15    Review of Systems   Constitutional:  Negative for fatigue.   Cardiovascular:  Negative for palpitations.   Gastrointestinal:  Negative for blood in stool.   Genitourinary:  Negative for dysuria.   Skin:  Negative for color change.   Neurological:  Positive for weakness.   Psychiatric/Behavioral:  Negative for agitation.    Objective:     Vital Signs (Most Recent):  Temp: 98.7 °F (37.1 °C) (07/15/22 0334)  Pulse: 60 (07/15/22 0814)  Resp: 18 (07/15/22 0814)  BP: 128/60 (07/15/22 0334)  SpO2: 97 % (07/15/22 0814)   Vital Signs (24h Range):  Temp:  [95.7 °F (35.4 °C)-98.7 °F (37.1 °C)] 98.7 °F (37.1 °C)  Pulse:  [60-70] 60  Resp:  [12-20] 18  SpO2:  [97 %-100 %] 97 %  BP: (107-175)/(41-76) 128/60     Weight: 126 kg (277 lb 12.5 oz)  Body mass index is 43.51 kg/m².    Intake/Output Summary (Last 24 hours) at 7/15/2022 0911  Last data filed at 7/15/2022 0055  Gross per 24 hour   Intake 200 ml   Output 300 ml   Net -100 ml        Physical Exam  Vitals and nursing note reviewed.   Constitutional:       General: She is not in acute distress.     Appearance: Normal appearance. She is obese. She is not ill-appearing or diaphoretic.   HENT:      Head: Normocephalic and atraumatic.      Mouth/Throat:      Mouth: Mucous membranes are dry.   Eyes:      Extraocular Movements: Extraocular movements intact.      Pupils: Pupils are equal, round, and reactive to light.   Cardiovascular:      Rate and Rhythm: Normal rate and regular rhythm.      Pulses: Normal pulses.      Heart sounds: Normal heart sounds.   Pulmonary:      Effort: Pulmonary effort is normal. No respiratory distress.      Breath sounds: Normal breath sounds.   Abdominal:       General: Bowel sounds are normal. There is no distension.      Palpations: Abdomen is soft.      Tenderness: There is no abdominal tenderness. There is no guarding.   Musculoskeletal:         General: No swelling. Normal range of motion.      Cervical back: Normal range of motion.   Skin:     General: Skin is warm.      Capillary Refill: Capillary refill takes 2 to 3 seconds.   Neurological:      General: No focal deficit present.      Mental Status: She is alert. Mental status is at baseline.   Psychiatric:         Mood and Affect: Mood normal.         Behavior: Behavior normal.         Thought Content: Thought content normal.       Significant Labs: A1C:   Recent Labs   Lab 01/18/22  0215 06/30/22  0934   HGBA1C 8.4* 8.4*       ABGs: No results for input(s): PH, PCO2, HCO3, POCSATURATED, BE, TOTALHB, COHB, METHB, O2HB, POCFIO2, PO2 in the last 48 hours.  Blood Culture: No results for input(s): LABBLOO in the last 48 hours.  BMP:   Recent Labs   Lab 07/15/22  0537   *      K 4.7   *   CO2 19*   BUN 48*   CREATININE 1.4   CALCIUM 7.9*       CBC:   Recent Labs   Lab 07/13/22  1241 07/14/22  0809 07/15/22  0537   WBC 4.74 2.84*  2.92* 3.47*   HGB 5.9* 6.7*  6.7* 6.7*   HCT 18.5* 20.8*  20.8* 21.0*    263  256 225       Cardiac Markers:   No results for input(s): CKMB, MYOGLOBIN, BNP, TROPISTAT in the last 48 hours.    Lactic Acid: No results for input(s): LACTATE in the last 48 hours.  Lipase: No results for input(s): LIPASE in the last 48 hours.  Lipid Panel: No results for input(s): CHOL, HDL, LDLCALC, TRIG, CHOLHDL in the last 48 hours.  Magnesium: No results for input(s): MG in the last 48 hours.  Troponin:   No results for input(s): TROPONINI in the last 48 hours.    TSH: No results for input(s): TSH in the last 4320 hours.  Urine Culture: No results for input(s): LABURIN in the last 48 hours.  Urine Studies:   No results for input(s): COLORU, APPEARANCEUA, PHUR, SPECGRAV,  PROTEINUA, GLUCUA, KETONESU, BILIRUBINUA, OCCULTUA, NITRITE, UROBILINOGEN, LEUKOCYTESUR, RBCUA, WBCUA, BACTERIA, SQUAMEPITHEL, HYALINECASTS in the last 48 hours.    Invalid input(s): MIRYAM      Significant Imaging: I have reviewed all pertinent imaging results/findings within the past 24 hours.

## 2022-07-15 NOTE — PROGRESS NOTES
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), Benign essential hypertension (11/14/2012), Cataract, Chronic kidney disease (CKD), stage III (moderate) (9/12/2013), Diabetic peripheral neuropathy associated with type 2 diabetes mellitus (11/14/2014), Gait disorder, Hyperlipidemia, Iritis - Both Eyes (6/10/2013), Kidney stone, Lymphedema, Morbid obesity with BMI of 40.0-44.9, adult (2/18/2015), Nephrolithiasis (4/20/2016), NS (nuclear sclerosis) (4/1/2013), Nuclear sclerosis - Both Eyes (4/29/2013), Preseptal cellulitis - Right Eye (4/29/2013), Proliferative diabetic retinopathy - Both Eyes (4/29/2013), Proliferative diabetic retinopathy, both eyes (4/1/2013), PSC (posterior subcapsular cataract) - Both Eyes (4/29/2013), S/P hernia repair (12/19/2012), TIA (transient ischemic attack) (11/18/2014), Tinea pedis (7/24/2012), Type 2 diabetes mellitus with diabetic polyneuropathy, with long-term current use of insulin (9/18/2015), Type 2 diabetes mellitus with renal manifestations, controlled (12/12/2013), Type 2 diabetes, controlled, with moderate nonproliferative diabetic retinopathy without macular edema (9/17/2015), Ulcer of left lower extremity, limited to breakdown of skin (7/8/2015), Unspecified cerebral artery occlusion with cerebral infarction (11/16/2014), Unspecified venous (peripheral) insufficiency, Ureteral stone with hydronephrosis (1/27/2016), UTI (lower urinary tract infection), Vaginal infection, and 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    She presents today due  "to weakness and black stools. Patient reports having black stools since 07/22. She had a large bowel movement today and since then her stomach has been feeling "bloated". She reports significant weakness with this as well. She denies history of bleeding from the stomach in the past shortness of breath fevers chest pain or new pains today. Patient states that she underwent surgery for veins on her legs on 06/30/2022. She reports taking a baby aspirin and Plavix at home. Denies SOB, CP, nausea, vomiting, or abdominal pain.    In the ED: patient was occult blood positive, Hgb 6.7, CXR without acute processes. Hemodynamically stable otherwise. Admitted to [facility] for further evaluation and GI work up.      Overview/Hospital Course:  No notes on file    Interval History: awake and alert, no new complaint  S/p EGD on 7/13 reports widely patent Schatzki ring and gastritis.   S/p  for colonoscopy on 7/14 with melena throughout the colon no bleeding source identified. Plan for capsule endoscopy today.   H/H still low- transfuse with 1 prbc again 7/15    Review of Systems   Constitutional:  Negative for fatigue.   Cardiovascular:  Negative for palpitations.   Gastrointestinal:  Negative for blood in stool.   Genitourinary:  Negative for dysuria.   Skin:  Negative for color change.   Neurological:  Positive for weakness.   Psychiatric/Behavioral:  Negative for agitation.    Objective:     Vital Signs (Most Recent):  Temp: 98.7 °F (37.1 °C) (07/15/22 0334)  Pulse: 60 (07/15/22 0814)  Resp: 18 (07/15/22 0814)  BP: 128/60 (07/15/22 0334)  SpO2: 97 % (07/15/22 0814)   Vital Signs (24h Range):  Temp:  [95.7 °F (35.4 °C)-98.7 °F (37.1 °C)] 98.7 °F (37.1 °C)  Pulse:  [60-70] 60  Resp:  [12-20] 18  SpO2:  [97 %-100 %] 97 %  BP: (107-175)/(41-76) 128/60     Weight: 126 kg (277 lb 12.5 oz)  Body mass index is 43.51 kg/m².    Intake/Output Summary (Last 24 hours) at 7/15/2022 0911  Last data filed at 7/15/2022 " 0055  Gross per 24 hour   Intake 200 ml   Output 300 ml   Net -100 ml        Physical Exam  Vitals and nursing note reviewed.   Constitutional:       General: She is not in acute distress.     Appearance: Normal appearance. She is obese. She is not ill-appearing or diaphoretic.   HENT:      Head: Normocephalic and atraumatic.      Mouth/Throat:      Mouth: Mucous membranes are dry.   Eyes:      Extraocular Movements: Extraocular movements intact.      Pupils: Pupils are equal, round, and reactive to light.   Cardiovascular:      Rate and Rhythm: Normal rate and regular rhythm.      Pulses: Normal pulses.      Heart sounds: Normal heart sounds.   Pulmonary:      Effort: Pulmonary effort is normal. No respiratory distress.      Breath sounds: Normal breath sounds.   Abdominal:      General: Bowel sounds are normal. There is no distension.      Palpations: Abdomen is soft.      Tenderness: There is no abdominal tenderness. There is no guarding.   Musculoskeletal:         General: No swelling. Normal range of motion.      Cervical back: Normal range of motion.   Skin:     General: Skin is warm.      Capillary Refill: Capillary refill takes 2 to 3 seconds.   Neurological:      General: No focal deficit present.      Mental Status: She is alert. Mental status is at baseline.   Psychiatric:         Mood and Affect: Mood normal.         Behavior: Behavior normal.         Thought Content: Thought content normal.       Significant Labs: A1C:   Recent Labs   Lab 01/18/22  0215 06/30/22  0934   HGBA1C 8.4* 8.4*       ABGs: No results for input(s): PH, PCO2, HCO3, POCSATURATED, BE, TOTALHB, COHB, METHB, O2HB, POCFIO2, PO2 in the last 48 hours.  Blood Culture: No results for input(s): LABBLOO in the last 48 hours.  BMP:   Recent Labs   Lab 07/15/22  0537   *      K 4.7   *   CO2 19*   BUN 48*   CREATININE 1.4   CALCIUM 7.9*       CBC:   Recent Labs   Lab 07/13/22  1241 07/14/22  0809 07/15/22  0537   WBC 4.74  2.84*  2.92* 3.47*   HGB 5.9* 6.7*  6.7* 6.7*   HCT 18.5* 20.8*  20.8* 21.0*    263  256 225       Cardiac Markers:   No results for input(s): CKMB, MYOGLOBIN, BNP, TROPISTAT in the last 48 hours.    Lactic Acid: No results for input(s): LACTATE in the last 48 hours.  Lipase: No results for input(s): LIPASE in the last 48 hours.  Lipid Panel: No results for input(s): CHOL, HDL, LDLCALC, TRIG, CHOLHDL in the last 48 hours.  Magnesium: No results for input(s): MG in the last 48 hours.  Troponin:   No results for input(s): TROPONINI in the last 48 hours.    TSH: No results for input(s): TSH in the last 4320 hours.  Urine Culture: No results for input(s): LABURIN in the last 48 hours.  Urine Studies:   No results for input(s): COLORU, APPEARANCEUA, PHUR, SPECGRAV, PROTEINUA, GLUCUA, KETONESU, BILIRUBINUA, OCCULTUA, NITRITE, UROBILINOGEN, LEUKOCYTESUR, RBCUA, WBCUA, BACTERIA, SQUAMEPITHEL, HYALINECASTS in the last 48 hours.    Invalid input(s): MIRYAM      Significant Imaging: I have reviewed all pertinent imaging results/findings within the past 24 hours.      Assessment/Plan:      * GIB (gastrointestinal bleeding)  Anemia of chronic disease  -Presented with weakness and melena, takes ASA and Plavix; Hb 6.7 from baseline 10; hemodynamically stable  -Underwent surgery for veins in legs on 6/30/22  -CXR: No evidence of acute chest disease since 2021.  -Occult blood positive  -GI bleed Pathway initiated  -2 LBIV  -Orthostatics ordered  -Initiated on protonix 40mg IV bid  -Hold anticoagulation  -Trend CBC- trend  -Transfuse unit PRBCs to keep Hb > 7  -NPO  -GI consulted- s/p EGD on 7/13 reports widely patent Schatzki ring and gastritis, s/p for colonoscopy on 7/14 with melena throughout the colon no bleeding source identified. Plan for capsule endoscopy today    Obesity, morbid (more than 100 lbs over ideal weight or BMI > 40)  -encourage lifestyle modifications (helathy diet, exercise)     (HFpEF) heart  failure with preserved ejection fraction  No complaints of CP or SOB  Patient is identified as having Diastolic (HFpEF) heart failure that is Chronic. CHF is currently controlled. Latest ECHO performed and demonstrates- Results for orders placed during the hospital encounter of 11/21/20    Echo Color Flow Doppler? Yes    Interpretation Summary  · Mild left atrial enlargement.  · There is left ventricular eccentric hypertrophy.  · The left ventricle is normal in size with normal systolic function. The estimated ejection fraction is 60%.  · Indeterminate diastolic function.  · Normal right ventricular size with normal right ventricular systolic function.  · The estimated PA systolic pressure is 31 mmHg.  · Normal central venous pressure (3 mmHg).  Continue CCB and monitor clinical status closely. Monitor on telemetry. Patient is off CHF pathway.  Monitor strict Is&Os and daily weights. Continue to stress to patient importance of self efficacy and  on diet for CHF. Last BNP reviewed- and noted below   Recent Labs   Lab 07/12/22 2054   *       PAOD (peripheral arterial occlusive disease)  Patient takes ASA-hold with GIB       Wheelchair dependent  -fall precautions      Type 2 diabetes mellitus with diabetic polyneuropathy, with long-term current use of insulin  Hemoglobin A1C   Date Value Ref Range Status   06/30/2022 8.4 (H) 4.0 - 5.6 % Final   -Serum glucose on admit 295  -Initiate on detemir 10u daily   -LDSSI with ACCUcheck Q6  -NPO with GI work up  -Hypoglycemia protocol PRN  -Monitor closely and adjust insulin regimen as clinically indicated to achieve levels of 140-180 during hospitalization      Stage 4 chronic kidney disease  Creatine stable for now. BMP reviewed- noted Estimated Creatinine Clearance: 33.6 mL/min (A) (based on SCr of 1.8 mg/dL (H)). according to latest data. Monitor UOP and serial BMP and adjust therapy as needed. Renally dose meds.      Essential hypertension  -Chronic,  uncontrolled.  Latest blood pressure and vitals reviewed-   Temp:  [97.5 °F (36.4 °C)-97.8 °F (36.6 °C)]   Pulse:  [74-78]   Resp:  [18]   BP: (143-155)/(60-70)   SpO2:  [97 %-100 %] .   Home meds for hypertension were reviewed and noted below.   Hypertension Medications             NIFEdipine (PROCARDIA-XL) 60 MG (OSM) 24 hr tablet Take 1 tablet (60 mg total) by mouth once daily.      -While in the hospital, will manage blood pressure as follows; Continue home antihypertensive regimen  -Will utilize p.r.n. blood pressure medication only if patient's blood pressure greater than  180/110 and she develops symptoms such as worsening chest pain or shortness of breath.      Hyperlipidemia LDL goal <100   Patient is chronically on statin.will continue for now. Monitor clinically. Last LDL was   Lab Results   Component Value Date    LDLCALC 104.8 06/30/2022            VTE Risk Mitigation (From admission, onward)         Ordered     Place sequential compression device  Until discontinued         07/13/22 0004                Discharge Planning   GABE: 7/14/2022     Code Status: Prior   Is the patient medically ready for discharge?:     Reason for patient still in hospital (select all that apply): Patient trending condition  Discharge Plan A: Home                  Su Maddox MD  Department of Hospital Medicine   [place] - Pending sale to [facility]

## 2022-07-15 NOTE — ASSESSMENT & PLAN NOTE
Anemia of chronic disease  -Presented with weakness and melena, takes ASA and Plavix; Hb 6.7 from baseline 10; hemodynamically stable  -Underwent surgery for veins in legs on 6/30/22  -CXR: No evidence of acute chest disease since 2021.  -Occult blood positive  -GI bleed Pathway initiated  -2 LBIV  -Orthostatics ordered  -Initiated on protonix 40mg IV bid  -Hold anticoagulation  -Trend CBC- trend  -Transfuse unit PRBCs to keep Hb > 7  -NPO  -GI consulted- s/p EGD on 7/13 reports widely patent Schatzki ring and gastritis, s/p for colonoscopy on 7/14 with melena throughout the colon no bleeding source identified. Plan for capsule endoscopy today

## 2022-07-15 NOTE — SUBJECTIVE & OBJECTIVE
Subjective:     Interval History:   Consented for video capsule  Will give reglan  No events  No fever  No radiating symptoms  No abd pain    Review of Systems   Constitutional:  Positive for fatigue. Negative for chills and fever.   Respiratory:  Negative for choking and chest tightness.    Gastrointestinal:  Negative for nausea and vomiting.   Neurological:  Negative for dizziness and headaches.   Psychiatric/Behavioral:  Negative for agitation and behavioral problems.    Objective:     Vital Signs (Most Recent):  Temp: 97.8 °F (36.6 °C) (07/15/22 1618)  Pulse: (!) 56 (07/15/22 1618)  Resp: 18 (07/15/22 1618)  BP: (!) 160/70 (07/15/22 1618)  SpO2: 97 % (07/15/22 1618)   Vital Signs (24h Range):  Temp:  [95.7 °F (35.4 °C)-98.7 °F (37.1 °C)] 97.8 °F (36.6 °C)  Pulse:  [55-68] 56  Resp:  [16-18] 18  SpO2:  [94 %-99 %] 97 %  BP: (128-167)/(51-74) 160/70     Weight: 126 kg (277 lb 12.5 oz) (07/15/22 0427)  Body mass index is 43.51 kg/m².      Intake/Output Summary (Last 24 hours) at 7/15/2022 1624  Last data filed at 7/15/2022 1400  Gross per 24 hour   Intake 10 ml   Output 300 ml   Net -290 ml       Lines/Drains/Airways       Airway  Duration                  Airway - Non-Surgical 07/14/22 1501 Nasal Cannula 1 day              Peripheral Intravenous Line  Duration                  Peripheral IV - Single Lumen 07/12/22 2051 20 G Right Forearm 2 days         Peripheral IV - Single Lumen 07/15/22 1308 20 G Anterior;Left;Proximal Forearm <1 day                    Physical Exam  Vitals reviewed.   Constitutional:       General: She is not in acute distress.  HENT:      Head: Normocephalic and atraumatic.   Eyes:      General: No scleral icterus.     Conjunctiva/sclera: Conjunctivae normal.   Skin:     General: Skin is warm.      Coloration: Skin is pale.      Findings: No rash.   Neurological:      Mental Status: She is oriented to person, place, and time.      Gait: Gait normal.   Psychiatric:         Mood and Affect:  Mood normal.         Behavior: Behavior normal.       Significant Labs:  CBC:   Recent Labs   Lab 07/14/22  0809 07/15/22  0537   WBC 2.84*  2.92* 3.47*   HGB 6.7*  6.7* 6.7*   HCT 20.8*  20.8* 21.0*     256 225         Significant Imaging:  Imaging results within the past 24 hours have been reviewed.

## 2022-07-15 NOTE — PROGRESS NOTES
[facility]  G[facility]enterology  Progress Note    Patient Name: Sherin Ortiz  MRN: [id number]  Admission Date: 7/12/2022  Hospital Length of Stay: 0 days  Code Status: Prior   Attending Provider: Su Maddox*  Consulting Provider: Kannan Mace MD  Primary Care Physician: Deedee Calderon MD  Principal Problem: GIB (gastrointestinal bleeding)      Subjective:     Interval History:   Consented for video capsule  Will give reglan  No events  No fever  No radiating symptoms  No abd pain    Review of Systems   Constitutional:  Positive for fatigue. Negative for chills and fever.   Respiratory:  Negative for choking and chest tightness.    Gastrointestinal:  Negative for nausea and vomiting.   Neurological:  Negative for dizziness and headaches.   Psychiatric/Behavioral:  Negative for agitation and behavioral problems.    Objective:     Vital Signs (Most Recent):  Temp: 97.8 °F (36.6 °C) (07/15/22 1618)  Pulse: (!) 56 (07/15/22 1618)  Resp: 18 (07/15/22 1618)  BP: (!) 160/70 (07/15/22 1618)  SpO2: 97 % (07/15/22 1618)   Vital Signs (24h Range):  Temp:  [95.7 °F (35.4 °C)-98.7 °F (37.1 °C)] 97.8 °F (36.6 °C)  Pulse:  [55-68] 56  Resp:  [16-18] 18  SpO2:  [94 %-99 %] 97 %  BP: (128-167)/(51-74) 160/70     Weight: 126 kg (277 lb 12.5 oz) (07/15/22 0427)  Body mass index is 43.51 kg/m².      Intake/Output Summary (Last 24 hours) at 7/15/2022 1624  Last data filed at 7/15/2022 1400  Gross per 24 hour   Intake 10 ml   Output 300 ml   Net -290 ml       Lines/Drains/Airways       Airway  Duration                  Airway - Non-Surgical 07/14/22 1501 Nasal Cannula 1 day              Peripheral Intravenous Line  Duration                  Peripheral IV - Single Lumen 07/12/22 2051 20 G Right Forearm 2 days         Peripheral IV - Single Lumen 07/15/22 1308 20 G Anterior;Left;Proximal Forearm <1 day                    Physical Exam  Vitals reviewed.   Constitutional:       General: She is not in acute  distress.  HENT:      Head: Normocephalic and atraumatic.   Eyes:      General: No scleral icterus.     Conjunctiva/sclera: Conjunctivae normal.   Skin:     General: Skin is warm.      Coloration: Skin is pale.      Findings: No rash.   Neurological:      Mental Status: She is oriented to person, place, and time.      Gait: Gait normal.   Psychiatric:         Mood and Affect: Mood normal.         Behavior: Behavior normal.       Significant Labs:  CBC:   Recent Labs   Lab 07/14/22  0809 07/15/22  0537   WBC 2.84*  2.92* 3.47*   HGB 6.7*  6.7* 6.7*   HCT 20.8*  20.8* 21.0*     256 225         Significant Imaging:  Imaging results within the past 24 hours have been reviewed.    Assessment/Plan:     * GIB (gastrointestinal bleeding)  Negative egd and colon  With old blood upstream in ileum    Plan for capsule endoscopy today    Ok for diet over weekend    Will have capsule read over weekend to determine any possible next steps  PPI IV daily for now          Thank you for your consult. I will follow-up with patient. Please contact us if you have any additional questions.    Kannan Mace MD  Gastroenterology  [place] [facility]

## 2022-07-15 NOTE — ASSESSMENT & PLAN NOTE
Negative egd and colon  With old blood upstream in ileum    Plan for capsule endoscopy today    Ok for diet over weekend    Will have capsule read over weekend to determine any possible next steps  PPI IV daily for now

## 2022-07-16 LAB
BASOPHILS # BLD AUTO: 0.03 K/UL (ref 0–0.2)
BASOPHILS # BLD AUTO: 0.03 K/UL (ref 0–0.2)
BASOPHILS NFR BLD: 0.3 % (ref 0–1.9)
BASOPHILS NFR BLD: 0.7 % (ref 0–1.9)
DIFFERENTIAL METHOD: ABNORMAL
DIFFERENTIAL METHOD: ABNORMAL
EOSINOPHIL # BLD AUTO: 0.1 K/UL (ref 0–0.5)
EOSINOPHIL # BLD AUTO: 0.1 K/UL (ref 0–0.5)
EOSINOPHIL NFR BLD: 1.1 % (ref 0–8)
EOSINOPHIL NFR BLD: 2.2 % (ref 0–8)
ERYTHROCYTE [DISTWIDTH] IN BLOOD BY AUTOMATED COUNT: 15.9 % (ref 11.5–14.5)
ERYTHROCYTE [DISTWIDTH] IN BLOOD BY AUTOMATED COUNT: 15.9 % (ref 11.5–14.5)
HCT VFR BLD AUTO: 24.3 % (ref 37–48.5)
HCT VFR BLD AUTO: 25.9 % (ref 37–48.5)
HGB BLD-MCNC: 7.6 G/DL (ref 12–16)
HGB BLD-MCNC: 7.9 G/DL (ref 12–16)
IMM GRANULOCYTES # BLD AUTO: 0.01 K/UL (ref 0–0.04)
IMM GRANULOCYTES # BLD AUTO: 0.01 K/UL (ref 0–0.04)
IMM GRANULOCYTES NFR BLD AUTO: 0.1 % (ref 0–0.5)
IMM GRANULOCYTES NFR BLD AUTO: 0.2 % (ref 0–0.5)
LYMPHOCYTES # BLD AUTO: 1.2 K/UL (ref 1–4.8)
LYMPHOCYTES # BLD AUTO: 1.4 K/UL (ref 1–4.8)
LYMPHOCYTES NFR BLD: 15.6 % (ref 18–48)
LYMPHOCYTES NFR BLD: 28.8 % (ref 18–48)
MCH RBC QN AUTO: 28.9 PG (ref 27–31)
MCH RBC QN AUTO: 29.5 PG (ref 27–31)
MCHC RBC AUTO-ENTMCNC: 30.5 G/DL (ref 32–36)
MCHC RBC AUTO-ENTMCNC: 31.3 G/DL (ref 32–36)
MCV RBC AUTO: 92 FL (ref 82–98)
MCV RBC AUTO: 97 FL (ref 82–98)
MONOCYTES # BLD AUTO: 0.4 K/UL (ref 0.3–1)
MONOCYTES # BLD AUTO: 0.7 K/UL (ref 0.3–1)
MONOCYTES NFR BLD: 7.6 % (ref 4–15)
MONOCYTES NFR BLD: 9.7 % (ref 4–15)
NEUTROPHILS # BLD AUTO: 2.4 K/UL (ref 1.8–7.7)
NEUTROPHILS # BLD AUTO: 6.7 K/UL (ref 1.8–7.7)
NEUTROPHILS NFR BLD: 58.4 % (ref 38–73)
NEUTROPHILS NFR BLD: 75.3 % (ref 38–73)
NRBC BLD-RTO: 0 /100 WBC
NRBC BLD-RTO: 0 /100 WBC
PLATELET # BLD AUTO: 199 K/UL (ref 150–450)
PLATELET # BLD AUTO: 232 K/UL (ref 150–450)
PMV BLD AUTO: 9.1 FL (ref 9.2–12.9)
PMV BLD AUTO: 9.4 FL (ref 9.2–12.9)
POCT GLUCOSE: 156 MG/DL (ref 70–110)
POCT GLUCOSE: 156 MG/DL (ref 70–110)
POCT GLUCOSE: 172 MG/DL (ref 70–110)
POCT GLUCOSE: 174 MG/DL (ref 70–110)
POCT GLUCOSE: 225 MG/DL (ref 70–110)
RBC # BLD AUTO: 2.63 M/UL (ref 4–5.4)
RBC # BLD AUTO: 2.68 M/UL (ref 4–5.4)
WBC # BLD AUTO: 4.03 K/UL (ref 3.9–12.7)
WBC # BLD AUTO: 8.87 K/UL (ref 3.9–12.7)

## 2022-07-16 PROCEDURE — 25000003 PHARM REV CODE 250: Performed by: NURSE PRACTITIONER

## 2022-07-16 PROCEDURE — 94761 N-INVAS EAR/PLS OXIMETRY MLT: CPT

## 2022-07-16 PROCEDURE — 99900035 HC TECH TIME PER 15 MIN (STAT)

## 2022-07-16 PROCEDURE — 11000001 HC ACUTE MED/SURG PRIVATE ROOM

## 2022-07-16 PROCEDURE — 91110 GI TRC IMG INTRAL ESOPH-ILE: CPT | Mod: 26,NSCH,, | Performed by: INTERNAL MEDICINE

## 2022-07-16 PROCEDURE — A4216 STERILE WATER/SALINE, 10 ML: HCPCS | Performed by: NURSE PRACTITIONER

## 2022-07-16 PROCEDURE — C9113 INJ PANTOPRAZOLE SODIUM, VIA: HCPCS | Performed by: NURSE PRACTITIONER

## 2022-07-16 PROCEDURE — 63600175 PHARM REV CODE 636 W HCPCS: Performed by: NURSE PRACTITIONER

## 2022-07-16 PROCEDURE — 94760 N-INVAS EAR/PLS OXIMETRY 1: CPT

## 2022-07-16 PROCEDURE — 36415 COLL VENOUS BLD VENIPUNCTURE: CPT | Performed by: NURSE PRACTITIONER

## 2022-07-16 PROCEDURE — 99233 SBSQ HOSP IP/OBS HIGH 50: CPT | Mod: ,,, | Performed by: INTERNAL MEDICINE

## 2022-07-16 PROCEDURE — 85025 COMPLETE CBC W/AUTO DIFF WBC: CPT | Performed by: NURSE PRACTITIONER

## 2022-07-16 PROCEDURE — 99233 PR SUBSEQUENT HOSPITAL CARE,LEVL III: ICD-10-PCS | Mod: ,,, | Performed by: INTERNAL MEDICINE

## 2022-07-16 PROCEDURE — 25000003 PHARM REV CODE 250: Performed by: FAMILY MEDICINE

## 2022-07-16 PROCEDURE — 91110 PR GI TRACT CAPSULE ENDOSCOPY: ICD-10-PCS | Mod: 26,NSCH,, | Performed by: INTERNAL MEDICINE

## 2022-07-16 RX ORDER — MUPIROCIN 20 MG/G
OINTMENT TOPICAL 2 TIMES DAILY
Status: DISCONTINUED | OUTPATIENT
Start: 2022-07-16 | End: 2022-07-19 | Stop reason: HOSPADM

## 2022-07-16 RX ORDER — LISINOPRIL 10 MG/1
10 TABLET ORAL DAILY
Status: DISCONTINUED | OUTPATIENT
Start: 2022-07-16 | End: 2022-07-19 | Stop reason: HOSPADM

## 2022-07-16 RX ADMIN — SODIUM CHLORIDE 1000 ML: 0.9 INJECTION, SOLUTION INTRAVENOUS at 11:07

## 2022-07-16 RX ADMIN — INSULIN ASPART 2 UNITS: 100 INJECTION, SOLUTION INTRAVENOUS; SUBCUTANEOUS at 08:07

## 2022-07-16 RX ADMIN — MUPIROCIN: 20 OINTMENT TOPICAL at 08:07

## 2022-07-16 RX ADMIN — Medication 10 ML: at 10:07

## 2022-07-16 RX ADMIN — PANTOPRAZOLE SODIUM 40 MG: 40 INJECTION, POWDER, FOR SOLUTION INTRAVENOUS at 08:07

## 2022-07-16 RX ADMIN — ATORVASTATIN CALCIUM 40 MG: 40 TABLET, FILM COATED ORAL at 08:07

## 2022-07-16 RX ADMIN — PANTOPRAZOLE SODIUM 40 MG: 40 INJECTION, POWDER, FOR SOLUTION INTRAVENOUS at 09:07

## 2022-07-16 RX ADMIN — Medication 10 ML: at 02:07

## 2022-07-16 RX ADMIN — NIFEDIPINE 60 MG: 30 TABLET, FILM COATED, EXTENDED RELEASE ORAL at 08:07

## 2022-07-16 RX ADMIN — SODIUM CHLORIDE: 0.9 INJECTION, SOLUTION INTRAVENOUS at 11:07

## 2022-07-16 RX ADMIN — LISINOPRIL 10 MG: 10 TABLET ORAL at 02:07

## 2022-07-16 NOTE — PLAN OF CARE
Problem: Bleeding (Gastrointestinal Bleeding)  Goal: Hemostasis  Outcome: Ongoing, Progressing     Problem: Adult Inpatient Plan of Care  Goal: Plan of Care Review  Outcome: Ongoing, Progressing     Problem: Fall Injury Risk  Goal: Absence of Fall and Fall-Related Injury  Outcome: Ongoing, Progressing     Problem: Skin Injury Risk Increased  Goal: Skin Health and Integrity  Outcome: Ongoing, Progressing

## 2022-07-16 NOTE — ASSESSMENT & PLAN NOTE
Anemia of chronic disease  -Presented with weakness and melena, takes ASA and Plavix; Hb 6.7 from baseline 10; hemodynamically stable  -Underwent surgery for veins in legs on 6/30/22  -CXR: No evidence of acute chest disease since 2021.  -Occult blood positive  -GI bleed Pathway initiated  -2 LBIV  -Orthostatics ordered  -Initiated on protonix 40mg IV bid  -Hold anticoagulation  -Trend CBC- trend  -Transfuse unit PRBCs to keep Hb > 7  -NPO  -GI consulted- s/p EGD on 7/13 reports widely patent Schatzki ring and gastritis, s/p for colonoscopy on 7/14 with melena throughout the colon no bleeding source identified.   S/p capsule endoscopy 7/15 - pending reading

## 2022-07-16 NOTE — PLAN OF CARE
Problem: Adult Inpatient Plan of Care  Goal: Plan of Care Review  7/16/2022 0236 by Gretta Bowie RN  Outcome: Ongoing, Progressing  Chart and care plan reviewed

## 2022-07-16 NOTE — SUBJECTIVE & OBJECTIVE
Subjective:     Interval History:   No acute events overnight.  Video capsule reviewed today:  Suggestive of bleeding and proximal small bowel.  Patient denies further melena.  Denies significant abdominal pain.  Hemoglobin stable    Review of Systems   Constitutional:  Positive for fatigue. Negative for chills and fever.   Respiratory:  Negative for choking and chest tightness.    Gastrointestinal:  Negative for nausea and vomiting.   Neurological:  Negative for dizziness and headaches.   Psychiatric/Behavioral:  Negative for agitation and behavioral problems.    Objective:     Vital Signs (Most Recent):  Temp: 97.6 °F (36.4 °C) (07/16/22 1132)  Pulse: 62 (07/16/22 1132)  Resp: 16 (07/16/22 1132)  BP: (!) 168/68 (07/16/22 1400)  SpO2: 99 % (07/16/22 1132)   Vital Signs (24h Range):  Temp:  [96.2 °F (35.7 °C)-98.3 °F (36.8 °C)] 97.6 °F (36.4 °C)  Pulse:  [55-68] 62  Resp:  [16-20] 16  SpO2:  [94 %-99 %] 99 %  BP: (134-183)/(63-89) 168/68     Weight: 126 kg (277 lb 12.5 oz) (07/15/22 0427)  Body mass index is 43.51 kg/m².      Intake/Output Summary (Last 24 hours) at 7/16/2022 1449  Last data filed at 7/16/2022 1400  Gross per 24 hour   Intake 5419.58 ml   Output 1450 ml   Net 3969.58 ml         Lines/Drains/Airways       Airway  Duration                  Airway - Non-Surgical 07/14/22 1501 Nasal Cannula 1 day              Peripheral Intravenous Line  Duration                  Peripheral IV - Single Lumen 07/12/22 2051 20 G Right Forearm 3 days         Peripheral IV - Single Lumen 07/15/22 1308 20 G Anterior;Left;Proximal Forearm 1 day                    Physical Exam  Vitals reviewed.   Constitutional:       General: She is not in acute distress.  HENT:      Head: Normocephalic and atraumatic.   Eyes:      General: No scleral icterus.     Conjunctiva/sclera: Conjunctivae normal.   Skin:     General: Skin is warm.      Coloration: Skin is pale.      Findings: No rash.   Neurological:      Mental Status: She is  oriented to person, place, and time.      Gait: Gait normal.   Psychiatric:         Mood and Affect: Mood normal.         Behavior: Behavior normal.       Significant Labs:  CBC:   Recent Labs   Lab 07/15/22  0537 07/15/22  1926 07/16/22  0556   WBC 3.47* 8.87 4.03   HGB 6.7* 7.9* 7.6*   HCT 21.0* 25.9* 24.3*    232 199           Significant Imaging:  Imaging results within the past 24 hours have been reviewed.

## 2022-07-16 NOTE — PROGRESS NOTES
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), Benign essential hypertension (11/14/2012), Cataract, Chronic kidney disease (CKD), stage III (moderate) (9/12/2013), Diabetic peripheral neuropathy associated with type 2 diabetes mellitus (11/14/2014), Gait disorder, Hyperlipidemia, Iritis - Both Eyes (6/10/2013), Kidney stone, Lymphedema, Morbid obesity with BMI of 40.0-44.9, adult (2/18/2015), Nephrolithiasis (4/20/2016), NS (nuclear sclerosis) (4/1/2013), Nuclear sclerosis - Both Eyes (4/29/2013), Preseptal cellulitis - Right Eye (4/29/2013), Proliferative diabetic retinopathy - Both Eyes (4/29/2013), Proliferative diabetic retinopathy, both eyes (4/1/2013), PSC (posterior subcapsular cataract) - Both Eyes (4/29/2013), S/P hernia repair (12/19/2012), TIA (transient ischemic attack) (11/18/2014), Tinea pedis (7/24/2012), Type 2 diabetes mellitus with diabetic polyneuropathy, with long-term current use of insulin (9/18/2015), Type 2 diabetes mellitus with renal manifestations, controlled (12/12/2013), Type 2 diabetes, controlled, with moderate nonproliferative diabetic retinopathy without macular edema (9/17/2015), Ulcer of left lower extremity, limited to breakdown of skin (7/8/2015), Unspecified cerebral artery occlusion with cerebral infarction (11/16/2014), Unspecified venous (peripheral) insufficiency, Ureteral stone with hydronephrosis (1/27/2016), UTI (lower urinary tract infection), Vaginal infection, and 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    She presents today due to  "weakness and black stools. Patient reports having black stools since 07/22. She had a large bowel movement today and since then her stomach has been feeling "bloated". She reports significant weakness with this as well. She denies history of bleeding from the stomach in the past shortness of breath fevers chest pain or new pains today. Patient states that she underwent surgery for veins on her legs on 06/30/2022. She reports taking a baby aspirin and Plavix at home. Denies SOB, CP, nausea, vomiting, or abdominal pain.    In the ED: patient was occult blood positive, Hgb 6.7, CXR without acute processes. Hemodynamically stable otherwise. Admitted to [facility] for further evaluation and GI work up.      Overview/Hospital Course:  No notes on file    Interval History: awake and alert, no new complaint  S/p EGD on 7/13 reports widely patent Schatzki ring and gastritis.   S/p  for colonoscopy on 7/14 with melena throughout the colon no bleeding source identified. S/p capsule endoscopy on 7/15 pending report.      H/H stable      Review of Systems   Constitutional:  Negative for fatigue.   Cardiovascular:  Negative for palpitations.   Gastrointestinal:  Negative for blood in stool.   Genitourinary:  Negative for dysuria.   Skin:  Negative for color change.   Neurological:  Positive for weakness.   Psychiatric/Behavioral:  Negative for agitation.    Objective:     Vital Signs (Most Recent):  Temp: 97.6 °F (36.4 °C) (07/16/22 1132)  Pulse: 62 (07/16/22 1132)  Resp: 16 (07/16/22 1132)  BP: (!) 168/68 (07/16/22 1132)  SpO2: 99 % (07/16/22 1132)   Vital Signs (24h Range):  Temp:  [96.2 °F (35.7 °C)-98.3 °F (36.8 °C)] 97.6 °F (36.4 °C)  Pulse:  [55-68] 62  Resp:  [16-20] 16  SpO2:  [94 %-99 %] 99 %  BP: (134-183)/(63-89) 168/68     Weight: 126 kg (277 lb 12.5 oz)  Body mass index is 43.51 kg/m².    Intake/Output Summary (Last 24 hours) at 7/16/2022 1332  Last data filed at 7/16/2022 0900  Gross per 24 hour "   Intake 5419.58 ml   Output 1450 ml   Net 3969.58 ml        Physical Exam  Vitals and nursing note reviewed.   Constitutional:       General: She is not in acute distress.     Appearance: Normal appearance. She is obese. She is not ill-appearing or diaphoretic.   HENT:      Head: Normocephalic and atraumatic.      Mouth/Throat:      Mouth: Mucous membranes are dry.   Eyes:      Extraocular Movements: Extraocular movements intact.      Pupils: Pupils are equal, round, and reactive to light.   Cardiovascular:      Rate and Rhythm: Normal rate and regular rhythm.      Pulses: Normal pulses.      Heart sounds: Normal heart sounds.   Pulmonary:      Effort: Pulmonary effort is normal. No respiratory distress.      Breath sounds: Normal breath sounds.   Abdominal:      General: Bowel sounds are normal. There is no distension.      Palpations: Abdomen is soft.      Tenderness: There is no abdominal tenderness. There is no guarding.   Musculoskeletal:         General: No swelling. Normal range of motion.      Cervical back: Normal range of motion.   Skin:     General: Skin is warm.      Capillary Refill: Capillary refill takes 2 to 3 seconds.   Neurological:      General: No focal deficit present.      Mental Status: She is alert. Mental status is at baseline.   Psychiatric:         Mood and Affect: Mood normal.         Behavior: Behavior normal.         Thought Content: Thought content normal.       Significant Labs: A1C:   Recent Labs   Lab 01/18/22  0215 06/30/22  0934   HGBA1C 8.4* 8.4*       ABGs: No results for input(s): PH, PCO2, HCO3, POCSATURATED, BE, TOTALHB, COHB, METHB, O2HB, POCFIO2, PO2 in the last 48 hours.  Blood Culture: No results for input(s): LABBLOO in the last 48 hours.  BMP:   Recent Labs   Lab 07/15/22  0537   *      K 4.7   *   CO2 19*   BUN 48*   CREATININE 1.4   CALCIUM 7.9*       CBC:   Recent Labs   Lab 07/15/22  0537 07/15/22  1926 07/16/22  0556   WBC 3.47* 8.87 4.03   HGB  6.7* 7.9* 7.6*   HCT 21.0* 25.9* 24.3*    232 199       Cardiac Markers:   No results for input(s): CKMB, MYOGLOBIN, BNP, TROPISTAT in the last 48 hours.    Lactic Acid: No results for input(s): LACTATE in the last 48 hours.  Lipase: No results for input(s): LIPASE in the last 48 hours.  Lipid Panel: No results for input(s): CHOL, HDL, LDLCALC, TRIG, CHOLHDL in the last 48 hours.  Magnesium: No results for input(s): MG in the last 48 hours.  Troponin:   No results for input(s): TROPONINI in the last 48 hours.    TSH: No results for input(s): TSH in the last 4320 hours.  Urine Culture: No results for input(s): LABURIN in the last 48 hours.  Urine Studies:   No results for input(s): COLORU, APPEARANCEUA, PHUR, SPECGRAV, PROTEINUA, GLUCUA, KETONESU, BILIRUBINUA, OCCULTUA, NITRITE, UROBILINOGEN, LEUKOCYTESUR, RBCUA, WBCUA, BACTERIA, SQUAMEPITHEL, HYALINECASTS in the last 48 hours.    Invalid input(s): WRIGHTSUR      Significant Imaging: I have reviewed all pertinent imaging results/findings within the past 24 hours.      Assessment/Plan:      * GIB (gastrointestinal bleeding)  Anemia of chronic disease  -Presented with weakness and melena, takes ASA and Plavix; Hb 6.7 from baseline 10; hemodynamically stable  -Underwent surgery for veins in legs on 6/30/22  -CXR: No evidence of acute chest disease since 2021.  -Occult blood positive  -GI bleed Pathway initiated  -2 LBIV  -Orthostatics ordered  -Initiated on protonix 40mg IV bid  -Hold anticoagulation  -Trend CBC- trend  -Transfuse unit PRBCs to keep Hb > 7  -NPO  -GI consulted- s/p EGD on 7/13 reports widely patent Schatzki ring and gastritis, s/p for colonoscopy on 7/14 with melena throughout the colon no bleeding source identified.   S/p capsule endoscopy 7/15 - pending reading     Obesity, morbid (more than 100 lbs over ideal weight or BMI > 40)  -encourage lifestyle modifications (helathy diet, exercise)     (HFpEF) heart failure with preserved ejection  fraction  No complaints of CP or SOB  Patient is identified as having Diastolic (HFpEF) heart failure that is Chronic. CHF is currently controlled. Latest ECHO performed and demonstrates- Results for orders placed during the hospital encounter of 11/21/20    Echo Color Flow Doppler? Yes    Interpretation Summary  · Mild left atrial enlargement.  · There is left ventricular eccentric hypertrophy.  · The left ventricle is normal in size with normal systolic function. The estimated ejection fraction is 60%.  · Indeterminate diastolic function.  · Normal right ventricular size with normal right ventricular systolic function.  · The estimated PA systolic pressure is 31 mmHg.  · Normal central venous pressure (3 mmHg).  Continue CCB and monitor clinical status closely. Monitor on telemetry. Patient is off CHF pathway.  Monitor strict Is&Os and daily weights. Continue to stress to patient importance of self efficacy and  on diet for CHF. Last BNP reviewed- and noted below   Recent Labs   Lab 07/12/22 2054   *       PAOD (peripheral arterial occlusive disease)  Patient takes ASA-hold with GIB       Wheelchair dependent  -fall precautions      Type 2 diabetes mellitus with diabetic polyneuropathy, with long-term current use of insulin  Hemoglobin A1C   Date Value Ref Range Status   06/30/2022 8.4 (H) 4.0 - 5.6 % Final   -Serum glucose on admit 295  -Initiate on detemir 10u daily   -LDSSI with ACCUcheck Q6  -NPO with GI work up  -Hypoglycemia protocol PRN  -Monitor closely and adjust insulin regimen as clinically indicated to achieve levels of 140-180 during hospitalization      Stage 4 chronic kidney disease  Creatine stable for now. BMP reviewed- noted Estimated Creatinine Clearance: 33.6 mL/min (A) (based on SCr of 1.8 mg/dL (H)). according to latest data. Monitor UOP and serial BMP and adjust therapy as needed. Renally dose meds.      Essential hypertension  -Chronic, uncontrolled.  Latest blood pressure  and vitals reviewed-   Temp:  [97.5 °F (36.4 °C)-97.8 °F (36.6 °C)]   Pulse:  [74-78]   Resp:  [18]   BP: (143-155)/(60-70)   SpO2:  [97 %-100 %] .   Home meds for hypertension were reviewed and noted below.   Hypertension Medications             NIFEdipine (PROCARDIA-XL) 60 MG (OSM) 24 hr tablet Take 1 tablet (60 mg total) by mouth once daily.      -While in the hospital, will manage blood pressure as follows; Continue home antihypertensive regimen  -Will utilize p.r.n. blood pressure medication only if patient's blood pressure greater than  180/110 and she develops symptoms such as worsening chest pain or shortness of breath.      Hyperlipidemia LDL goal <100   Patient is chronically on statin.will continue for now. Monitor clinically. Last LDL was   Lab Results   Component Value Date    LDLCALC 104.8 06/30/2022            VTE Risk Mitigation (From admission, onward)         Ordered     Place sequential compression device  Until discontinued         07/13/22 0004                Discharge Planning   GABE: 7/14/2022     Code Status: Prior   Is the patient medically ready for discharge?:     Reason for patient still in hospital (select all that apply): Patient trending condition  Discharge Plan A: Home                  Su Maddox MD  Department of Hospital Medicine   [place] [facility]

## 2022-07-16 NOTE — ASSESSMENT & PLAN NOTE
Negative egd and colon  With old blood upstream in ileum  Video capsule 7/15 suggestive of active bleeding proximal small bowel a    Plan for a push enteroscopy versus balloon assisted enteroscopy on Monday  Trend hemoglobin

## 2022-07-16 NOTE — [employer] PATIENT INSTRUCTIONS
Discharge Summary/Instructions after an Endoscopic Procedure  Patient Name: Sherin Ortiz  Patient MRN: [id number]  Patient YOB: 1945  Friday, July 15, 2022  J Carlos Hurst MD  Dear patient,  As a result of recent federal legislation (The Federal Cures Act), you may   receive lab or pathology results from your procedure in your [facility]   account before your physician is able to contact you. Your physician or   their representative will relay the results to you with their   recommendations at their soonest availability.  Thank you,  Your health is very important to us during the Covid Crisis. Following your   procedure today, you will receive a daily text for 2 weeks asking about   signs or symptoms of Covid 19.  Please respond to this text when you   receive it so we can follow up and keep you as safe as possible.   RESTRICTIONS:  During your procedure today, you received medications for sedation.  These   medications may affect your judgment, balance and coordination.  Therefore,   for 24 hours, you have the following restrictions:   - DO NOT drive a car, operate machinery, make legal/financial decisions,   sign important papers or drink alcohol.    ACTIVITY:  Today: no heavy lifting, straining or running due to procedural   sedation/anesthesia.  The following day: return to full activity including work.  DIET:  Eat and drink normally unless instructed otherwise.     TREATMENT FOR COMMON SIDE EFFECTS:  - Mild abdominal pain, nausea, belching, bloating or excessive gas:  rest,   eat lightly and use a heating pad.  - Sore Throat: treat with throat lozenges and/or gargle with warm salt   water.  - Because air was used during the procedure, expelling large amounts of air   from your rectum or belching is normal.  - If a bowel prep was taken, you may not have a bowel movement for 1-3 days.    This is normal.  SYMPTOMS TO WATCH FOR AND REPORT TO YOUR PHYSICIAN:  1. Abdominal pain or bloating, other  than gas cramps.  2. Chest pain.  3. Back pain.  4. Signs of infection such as: chills or fever occurring within 24 hours   after the procedure.  5. Rectal bleeding, which would show as bright red, maroon, or black stools.   (A tablespoon of blood from the rectum is not serious, especially if   hemorrhoids are present.)  6. Vomiting.  7. Weakness or dizziness.  GO DIRECTLY TO THE NEAREST EMERGENCY ROOM IF YOU HAVE ANY OF THE FOLLOWING:      Difficulty breathing              Chills and/or fever over 101 F   Persistent vomiting and/or vomiting blood   Severe abdominal pain   Severe chest pain   Black, tarry stools   Bleeding- more than one tablespoon   Any other symptom or condition that you feel may need urgent attention  Your doctor recommends these additional instructions:  If any biopsies were taken, your doctors clinic will contact you in 1 to 2   weeks with any results.  - Return patient to [facility] for [facility] care.   - Resume previous diet.   - Continue present medications.   - Perform a small bowel enteroscopy (SBE) at the next available   appointment.  For questions, problems or results please call your physician - J Carlos Hurst MD.  EMERGENCY PHONE NUMBER: [phone],  LAB RESULTS: [phone]  IF A COMPLICATION OR EMERGENCY SITUATION ARISES AND YOU ARE UNABLE TO REACH   YOUR PHYSICIAN - GO DIRECTLY TO THE EMERGENCY ROOM.  J Carlos Hurst MD  7/16/2022 1:13:43 PM  This report has been verified and signed electronically.  Dear patient,  As a result of recent federal legislation (The Federal Cures Act), you may   receive lab or pathology results from your procedure in your [facility]   account before your physician is able to contact you. Your physician or   their representative will relay the results to you with their   recommendations at their soonest availability.  Thank you,  [employer]ATION

## 2022-07-16 NOTE — SUBJECTIVE & OBJECTIVE
Interval History: awake and alert, no new complaint  S/p EGD on 7/13 reports widely patent Schatzki ring and gastritis.   S/p  for colonoscopy on 7/14 with melena throughout the colon no bleeding source identified. S/p capsule endoscopy on 7/15 pending report.      H/H stable      Review of Systems   Constitutional:  Negative for fatigue.   Cardiovascular:  Negative for palpitations.   Gastrointestinal:  Negative for blood in stool.   Genitourinary:  Negative for dysuria.   Skin:  Negative for color change.   Neurological:  Positive for weakness.   Psychiatric/Behavioral:  Negative for agitation.    Objective:     Vital Signs (Most Recent):  Temp: 97.6 °F (36.4 °C) (07/16/22 1132)  Pulse: 62 (07/16/22 1132)  Resp: 16 (07/16/22 1132)  BP: (!) 168/68 (07/16/22 1132)  SpO2: 99 % (07/16/22 1132)   Vital Signs (24h Range):  Temp:  [96.2 °F (35.7 °C)-98.3 °F (36.8 °C)] 97.6 °F (36.4 °C)  Pulse:  [55-68] 62  Resp:  [16-20] 16  SpO2:  [94 %-99 %] 99 %  BP: (134-183)/(63-89) 168/68     Weight: 126 kg (277 lb 12.5 oz)  Body mass index is 43.51 kg/m².    Intake/Output Summary (Last 24 hours) at 7/16/2022 1332  Last data filed at 7/16/2022 0900  Gross per 24 hour   Intake 5419.58 ml   Output 1450 ml   Net 3969.58 ml        Physical Exam  Vitals and nursing note reviewed.   Constitutional:       General: She is not in acute distress.     Appearance: Normal appearance. She is obese. She is not ill-appearing or diaphoretic.   HENT:      Head: Normocephalic and atraumatic.      Mouth/Throat:      Mouth: Mucous membranes are dry.   Eyes:      Extraocular Movements: Extraocular movements intact.      Pupils: Pupils are equal, round, and reactive to light.   Cardiovascular:      Rate and Rhythm: Normal rate and regular rhythm.      Pulses: Normal pulses.      Heart sounds: Normal heart sounds.   Pulmonary:      Effort: Pulmonary effort is normal. No respiratory distress.      Breath sounds: Normal breath sounds.   Abdominal:       General: Bowel sounds are normal. There is no distension.      Palpations: Abdomen is soft.      Tenderness: There is no abdominal tenderness. There is no guarding.   Musculoskeletal:         General: No swelling. Normal range of motion.      Cervical back: Normal range of motion.   Skin:     General: Skin is warm.      Capillary Refill: Capillary refill takes 2 to 3 seconds.   Neurological:      General: No focal deficit present.      Mental Status: She is alert. Mental status is at baseline.   Psychiatric:         Mood and Affect: Mood normal.         Behavior: Behavior normal.         Thought Content: Thought content normal.       Significant Labs: A1C:   Recent Labs   Lab 01/18/22  0215 06/30/22  0934   HGBA1C 8.4* 8.4*       ABGs: No results for input(s): PH, PCO2, HCO3, POCSATURATED, BE, TOTALHB, COHB, METHB, O2HB, POCFIO2, PO2 in the last 48 hours.  Blood Culture: No results for input(s): LABBLOO in the last 48 hours.  BMP:   Recent Labs   Lab 07/15/22  0537   *      K 4.7   *   CO2 19*   BUN 48*   CREATININE 1.4   CALCIUM 7.9*       CBC:   Recent Labs   Lab 07/15/22  0537 07/15/22  1926 07/16/22  0556   WBC 3.47* 8.87 4.03   HGB 6.7* 7.9* 7.6*   HCT 21.0* 25.9* 24.3*    232 199       Cardiac Markers:   No results for input(s): CKMB, MYOGLOBIN, BNP, TROPISTAT in the last 48 hours.    Lactic Acid: No results for input(s): LACTATE in the last 48 hours.  Lipase: No results for input(s): LIPASE in the last 48 hours.  Lipid Panel: No results for input(s): CHOL, HDL, LDLCALC, TRIG, CHOLHDL in the last 48 hours.  Magnesium: No results for input(s): MG in the last 48 hours.  Troponin:   No results for input(s): TROPONINI in the last 48 hours.    TSH: No results for input(s): TSH in the last 4320 hours.  Urine Culture: No results for input(s): LABURIN in the last 48 hours.  Urine Studies:   No results for input(s): COLORU, APPEARANCEUA, PHUR, SPECGRAV, PROTEINUA, GLUCUA, KETONESU,  BILIRUBINUA, OCCULTUA, NITRITE, UROBILINOGEN, LEUKOCYTESUR, RBCUA, WBCUA, BACTERIA, SQUAMEPITHEL, HYALINECASTS in the last 48 hours.    Invalid input(s): MIRYAM      Significant Imaging: I have reviewed all pertinent imaging results/findings within the past 24 hours.

## 2022-07-17 ENCOUNTER — ANESTHESIA EVENT (OUTPATIENT)
Dept: ENDOSCOPY | Facility: HOSPITAL | Age: 77
DRG: 394 | End: 2022-07-17
Payer: MEDICARE

## 2022-07-17 LAB
BASOPHILS # BLD AUTO: 0.01 K/UL (ref 0–0.2)
BASOPHILS NFR BLD: 0.2 % (ref 0–1.9)
DIFFERENTIAL METHOD: ABNORMAL
EOSINOPHIL # BLD AUTO: 0.1 K/UL (ref 0–0.5)
EOSINOPHIL NFR BLD: 2.9 % (ref 0–8)
ERYTHROCYTE [DISTWIDTH] IN BLOOD BY AUTOMATED COUNT: 15.7 % (ref 11.5–14.5)
HCT VFR BLD AUTO: 24.4 % (ref 37–48.5)
HGB BLD-MCNC: 7.7 G/DL (ref 12–16)
IMM GRANULOCYTES # BLD AUTO: 0.01 K/UL (ref 0–0.04)
IMM GRANULOCYTES NFR BLD AUTO: 0.2 % (ref 0–0.5)
LYMPHOCYTES # BLD AUTO: 1.4 K/UL (ref 1–4.8)
LYMPHOCYTES NFR BLD: 32.9 % (ref 18–48)
MCH RBC QN AUTO: 29.4 PG (ref 27–31)
MCHC RBC AUTO-ENTMCNC: 31.6 G/DL (ref 32–36)
MCV RBC AUTO: 93 FL (ref 82–98)
MONOCYTES # BLD AUTO: 0.4 K/UL (ref 0.3–1)
MONOCYTES NFR BLD: 8.5 % (ref 4–15)
NEUTROPHILS # BLD AUTO: 2.3 K/UL (ref 1.8–7.7)
NEUTROPHILS NFR BLD: 55.3 % (ref 38–73)
NRBC BLD-RTO: 0 /100 WBC
PLATELET # BLD AUTO: 206 K/UL (ref 150–450)
PMV BLD AUTO: 9.2 FL (ref 9.2–12.9)
POCT GLUCOSE: 152 MG/DL (ref 70–110)
POCT GLUCOSE: 157 MG/DL (ref 70–110)
POCT GLUCOSE: 194 MG/DL (ref 70–110)
POCT GLUCOSE: 213 MG/DL (ref 70–110)
RBC # BLD AUTO: 2.62 M/UL (ref 4–5.4)
WBC # BLD AUTO: 4.1 K/UL (ref 3.9–12.7)

## 2022-07-17 PROCEDURE — C9113 INJ PANTOPRAZOLE SODIUM, VIA: HCPCS | Performed by: NURSE PRACTITIONER

## 2022-07-17 PROCEDURE — 11000001 HC ACUTE MED/SURG PRIVATE ROOM

## 2022-07-17 PROCEDURE — 85025 COMPLETE CBC W/AUTO DIFF WBC: CPT | Performed by: NURSE PRACTITIONER

## 2022-07-17 PROCEDURE — 25000003 PHARM REV CODE 250: Performed by: FAMILY MEDICINE

## 2022-07-17 PROCEDURE — 63600175 PHARM REV CODE 636 W HCPCS: Performed by: NURSE PRACTITIONER

## 2022-07-17 PROCEDURE — 99232 PR SUBSEQUENT HOSPITAL CARE,LEVL II: ICD-10-PCS | Mod: ,,, | Performed by: INTERNAL MEDICINE

## 2022-07-17 PROCEDURE — A4216 STERILE WATER/SALINE, 10 ML: HCPCS | Performed by: NURSE PRACTITIONER

## 2022-07-17 PROCEDURE — 25000003 PHARM REV CODE 250: Performed by: NURSE PRACTITIONER

## 2022-07-17 PROCEDURE — 99232 SBSQ HOSP IP/OBS MODERATE 35: CPT | Mod: ,,, | Performed by: INTERNAL MEDICINE

## 2022-07-17 PROCEDURE — 36415 COLL VENOUS BLD VENIPUNCTURE: CPT | Performed by: NURSE PRACTITIONER

## 2022-07-17 PROCEDURE — 94761 N-INVAS EAR/PLS OXIMETRY MLT: CPT

## 2022-07-17 PROCEDURE — 99900035 HC TECH TIME PER 15 MIN (STAT)

## 2022-07-17 RX ADMIN — NIFEDIPINE 60 MG: 30 TABLET, FILM COATED, EXTENDED RELEASE ORAL at 08:07

## 2022-07-17 RX ADMIN — Medication 10 ML: at 05:07

## 2022-07-17 RX ADMIN — ATORVASTATIN CALCIUM 40 MG: 40 TABLET, FILM COATED ORAL at 09:07

## 2022-07-17 RX ADMIN — PANTOPRAZOLE SODIUM 40 MG: 40 INJECTION, POWDER, FOR SOLUTION INTRAVENOUS at 09:07

## 2022-07-17 RX ADMIN — INSULIN ASPART 1 UNITS: 100 INJECTION, SOLUTION INTRAVENOUS; SUBCUTANEOUS at 12:07

## 2022-07-17 RX ADMIN — SODIUM CHLORIDE: 0.9 INJECTION, SOLUTION INTRAVENOUS at 02:07

## 2022-07-17 RX ADMIN — MUPIROCIN: 20 OINTMENT TOPICAL at 08:07

## 2022-07-17 RX ADMIN — Medication 10 ML: at 09:07

## 2022-07-17 RX ADMIN — LISINOPRIL 10 MG: 10 TABLET ORAL at 08:07

## 2022-07-17 RX ADMIN — Medication 10 ML: at 02:07

## 2022-07-17 RX ADMIN — MUPIROCIN: 20 OINTMENT TOPICAL at 09:07

## 2022-07-17 NOTE — SUBJECTIVE & OBJECTIVE
Subjective:     Interval History:  No acute events overnight.  Hemoglobin stable.  Denies overt bleeding.    Review of Systems   Constitutional:  Positive for fatigue. Negative for chills and fever.   Respiratory:  Negative for choking and chest tightness.    Gastrointestinal:  Negative for nausea and vomiting.   Neurological:  Negative for dizziness and headaches.   Psychiatric/Behavioral:  Negative for agitation and behavioral problems.    Objective:     Vital Signs (Most Recent):  Temp: (!) 95.4 °F (35.2 °C) (07/17/22 1123)  Pulse: (!) 57 (07/17/22 1123)  Resp: 18 (07/17/22 1123)  BP: (!) 122/58 (07/17/22 1123)  SpO2: 98 % (07/17/22 1123)   Vital Signs (24h Range):  Temp:  [95.4 °F (35.2 °C)-98.4 °F (36.9 °C)] 95.4 °F (35.2 °C)  Pulse:  [57-65] 57  Resp:  [18] 18  SpO2:  [96 %-100 %] 98 %  BP: (122-184)/(58-80) 122/58     Weight: 127.2 kg (280 lb 6.8 oz) (07/17/22 0543)  Body mass index is 43.92 kg/m².      Intake/Output Summary (Last 24 hours) at 7/17/2022 1504  Last data filed at 7/17/2022 1416  Gross per 24 hour   Intake 910 ml   Output 1850 ml   Net -940 ml       Lines/Drains/Airways       Airway  Duration                  Airway - Non-Surgical 07/14/22 1501 Nasal Cannula 3 days              Peripheral Intravenous Line  Duration                  Peripheral IV - Single Lumen 07/12/22 2051 20 G Right Forearm 4 days                    Physical Exam  Vitals reviewed.   Constitutional:       General: She is not in acute distress.  HENT:      Head: Normocephalic and atraumatic.   Eyes:      General: No scleral icterus.     Conjunctiva/sclera: Conjunctivae normal.   Skin:     General: Skin is warm.      Coloration: Skin is pale.      Findings: No rash.   Neurological:      Mental Status: She is oriented to person, place, and time.      Gait: Gait normal.   Psychiatric:         Mood and Affect: Mood normal.         Behavior: Behavior normal.       Significant Labs:  Recent Lab Results  (Last 5 results in the past  24 hours)        07/17/22  1126   07/17/22  0519   07/17/22  0500   07/17/22  0003   07/16/22  1903        Baso #     0.01           Basophil %     0.2           Differential Method     Automated           Eos #     0.1           Eosinophil %     2.9           Gran # (ANC)     2.3           Gran %     55.3           Hematocrit     24.4           Hemoglobin     7.7           Immature Grans (Abs)     0.01  Comment: Mild elevation in immature granulocytes is non specific and   can be seen in a variety of conditions including stress response,   acute inflammation, trauma and pregnancy. Correlation with other   laboratory and clinical findings is essential.             Immature Granulocytes     0.2           Lymph #     1.4           Lymph %     32.9           MCH     29.4           MCHC     31.6           MCV     93           Mono #     0.4           Mono %     8.5           MPV     9.2           nRBC     0           Platelets     206           POCT Glucose 152   157     213   225       RBC     2.62           RDW     15.7           WBC     4.10                                    Significant Imaging:  Imaging results within the past 24 hours have been reviewed.

## 2022-07-17 NOTE — SUBJECTIVE & OBJECTIVE
Interval History: awake and alert, complaint of left arm swelling around the IV site  S/p EGD on 7/13 reports widely patent Schatzki ring and gastritis.   S/p  for colonoscopy on 7/14 with melena throughout the colon no bleeding source identified. S/p capsule endoscopy on 7/15 reported active bleeding proximal small bowel. Plan for push enteroscopy Vs balloon assisted enteroscopy on 7/18.          H/H stable      Review of Systems   Constitutional:  Negative for fatigue.   Cardiovascular:  Negative for palpitations.   Gastrointestinal:  Negative for blood in stool.   Genitourinary:  Negative for dysuria.   Skin:  Negative for color change.   Neurological:  Positive for weakness.   Psychiatric/Behavioral:  Negative for agitation.    Objective:     Vital Signs (Most Recent):  Temp: 96 °F (35.6 °C) (07/17/22 0855)  Pulse: 62 (07/17/22 0855)  Resp: 18 (07/17/22 0855)  BP: (!) 130/59 (07/17/22 0855)  SpO2: 100 % (07/17/22 0926)   Vital Signs (24h Range):  Temp:  [96 °F (35.6 °C)-98.4 °F (36.9 °C)] 96 °F (35.6 °C)  Pulse:  [60-65] 62  Resp:  [16-18] 18  SpO2:  [96 %-100 %] 100 %  BP: (130-184)/(59-80) 130/59     Weight: 127.2 kg (280 lb 6.8 oz)  Body mass index is 43.92 kg/m².    Intake/Output Summary (Last 24 hours) at 7/17/2022 0939  Last data filed at 7/17/2022 0600  Gross per 24 hour   Intake 910 ml   Output 1550 ml   Net -640 ml        Physical Exam  Vitals and nursing note reviewed.   Constitutional:       General: She is not in acute distress.     Appearance: Normal appearance. She is obese. She is not ill-appearing or diaphoretic.   HENT:      Head: Normocephalic and atraumatic.      Mouth/Throat:      Mouth: Mucous membranes are dry.   Eyes:      Extraocular Movements: Extraocular movements intact.      Pupils: Pupils are equal, round, and reactive to light.   Cardiovascular:      Rate and Rhythm: Normal rate and regular rhythm.      Pulses: Normal pulses.      Heart sounds: Normal heart sounds.   Pulmonary:       Effort: Pulmonary effort is normal. No respiratory distress.      Breath sounds: Normal breath sounds.   Abdominal:      General: Bowel sounds are normal. There is no distension.      Palpations: Abdomen is soft.      Tenderness: There is no abdominal tenderness. There is no guarding.   Musculoskeletal:         General: No swelling. Normal range of motion.      Cervical back: Normal range of motion.   Skin:     General: Skin is warm.      Capillary Refill: Capillary refill takes 2 to 3 seconds.   Neurological:      General: No focal deficit present.      Mental Status: She is alert. Mental status is at baseline.   Psychiatric:         Mood and Affect: Mood normal.         Behavior: Behavior normal.         Thought Content: Thought content normal.       Significant Labs: A1C:   Recent Labs   Lab 06/30/22  0934   HGBA1C 8.4*       ABGs: No results for input(s): PH, PCO2, HCO3, POCSATURATED, BE, TOTALHB, COHB, METHB, O2HB, POCFIO2, PO2 in the last 48 hours.  Blood Culture: No results for input(s): LABBLOO in the last 48 hours.  BMP:   No results for input(s): GLU, NA, K, CL, CO2, BUN, CREATININE, CALCIUM, MG in the last 48 hours.    CBC:   Recent Labs   Lab 07/15/22  1926 07/16/22  0556 07/17/22  0500   WBC 8.87 4.03 4.10   HGB 7.9* 7.6* 7.7*   HCT 25.9* 24.3* 24.4*    199 206       Cardiac Markers:   No results for input(s): CKMB, MYOGLOBIN, BNP, TROPISTAT in the last 48 hours.    Lactic Acid: No results for input(s): LACTATE in the last 48 hours.  Lipase: No results for input(s): LIPASE in the last 48 hours.  Lipid Panel: No results for input(s): CHOL, HDL, LDLCALC, TRIG, CHOLHDL in the last 48 hours.  Magnesium: No results for input(s): MG in the last 48 hours.  Troponin:   No results for input(s): TROPONINI in the last 48 hours.    TSH: No results for input(s): TSH in the last 4320 hours.  Urine Culture: No results for input(s): LABURIN in the last 48 hours.  Urine Studies:   No results for input(s): COLORU,  APPEARANCEUA, PHUR, SPECGRAV, PROTEINUA, GLUCUA, KETONESU, BILIRUBINUA, OCCULTUA, NITRITE, UROBILINOGEN, LEUKOCYTESUR, RBCUA, WBCUA, BACTERIA, SQUAMEPITHEL, HYALINECASTS in the last 48 hours.    Invalid input(s): MIRYAM      Significant Imaging: I have reviewed all pertinent imaging results/findings within the past 24 hours.

## 2022-07-17 NOTE — ASSESSMENT & PLAN NOTE
Negative egd and colon  With old blood upstream in ileum  Video capsule 7/15 suggestive of active bleeding proximal small bowel    Plan for a push enteroscopy versus balloon assisted enteroscopy tomorrow  NPO after midnight  Trend hemoglobin

## 2022-07-17 NOTE — PROGRESS NOTES
[facility]  Gastroenterology  Progress Note    Patient Name: Sherin Ortiz  MRN: [id number]  Admission Date: 7/12/2022  Hospital Length of Stay: 2 days  Code Status: Prior   Attending Provider: Su Maddox*  Consulting Provider: J Carlos Hurst MD  Primary Care Physician: Deedee Calderon MD  Principal Problem: GIB (gastrointestinal bleeding)      Subjective:     Interval History:  No acute events overnight.  Hemoglobin stable.  Denies overt bleeding.    Review of Systems   Constitutional:  Positive for fatigue. Negative for chills and fever.   Respiratory:  Negative for choking and chest tightness.    Gastrointestinal:  Negative for nausea and vomiting.   Neurological:  Negative for dizziness and headaches.   Psychiatric/Behavioral:  Negative for agitation and behavioral problems.    Objective:     Vital Signs (Most Recent):  Temp: (!) 95.4 °F (35.2 °C) (07/17/22 1123)  Pulse: (!) 57 (07/17/22 1123)  Resp: 18 (07/17/22 1123)  BP: (!) 122/58 (07/17/22 1123)  SpO2: 98 % (07/17/22 1123)   Vital Signs (24h Range):  Temp:  [95.4 °F (35.2 °C)-98.4 °F (36.9 °C)] 95.4 °F (35.2 °C)  Pulse:  [57-65] 57  Resp:  [18] 18  SpO2:  [96 %-100 %] 98 %  BP: (122-184)/(58-80) 122/58     Weight: 127.2 kg (280 lb 6.8 oz) (07/17/22 0543)  Body mass index is 43.92 kg/m².      Intake/Output Summary (Last 24 hours) at 7/17/2022 1504  Last data filed at 7/17/2022 1416  Gross per 24 hour   Intake 910 ml   Output 1850 ml   Net -940 ml       Lines/Drains/Airways       Airway  Duration                  Airway - Non-Surgical 07/14/22 1501 Nasal Cannula 3 days              Peripheral Intravenous Line  Duration                  Peripheral IV - Single Lumen 07/12/22 2051 20 G Right Forearm 4 days                    Physical Exam  Vitals reviewed.   Constitutional:       General: She is not in acute distress.  HENT:      Head: Normocephalic and atraumatic.   Eyes:      General: No scleral icterus.     Conjunctiva/sclera:  Conjunctivae normal.   Skin:     General: Skin is warm.      Coloration: Skin is pale.      Findings: No rash.   Neurological:      Mental Status: She is oriented to person, place, and time.      Gait: Gait normal.   Psychiatric:         Mood and Affect: Mood normal.         Behavior: Behavior normal.       Significant Labs:  Recent Lab Results  (Last 5 results in the past 24 hours)        07/17/22  1126   07/17/22  0519   07/17/22  0500   07/17/22  0003   07/16/22  1903        Baso #     0.01           Basophil %     0.2           Differential Method     Automated           Eos #     0.1           Eosinophil %     2.9           Gran # (ANC)     2.3           Gran %     55.3           Hematocrit     24.4           Hemoglobin     7.7           Immature Grans (Abs)     0.01  Comment: Mild elevation in immature granulocytes is non specific and   can be seen in a variety of conditions including stress response,   acute inflammation, trauma and pregnancy. Correlation with other   laboratory and clinical findings is essential.             Immature Granulocytes     0.2           Lymph #     1.4           Lymph %     32.9           MCH     29.4           MCHC     31.6           MCV     93           Mono #     0.4           Mono %     8.5           MPV     9.2           nRBC     0           Platelets     206           POCT Glucose 152   157     213   225       RBC     2.62           RDW     15.7           WBC     4.10                                    Significant Imaging:  Imaging results within the past 24 hours have been reviewed.    Assessment/Plan:     * GIB (gastrointestinal bleeding)  Negative egd and colon  With old blood upstream in ileum  Video capsule 7/15 suggestive of active bleeding proximal small bowel    Plan for a push enteroscopy versus balloon assisted enteroscopy tomorrow  NPO after midnight  Trend hemoglobin          Thank you for your consult. I will follow-up with patient. Please contact us if you have  any additional questions.    J Carlos Hurst MD  Gastroenterology  [place] [facility]

## 2022-07-17 NOTE — PROGRESS NOTES
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), Benign essential hypertension (11/14/2012), Cataract, Chronic kidney disease (CKD), stage III (moderate) (9/12/2013), Diabetic peripheral neuropathy associated with type 2 diabetes mellitus (11/14/2014), Gait disorder, Hyperlipidemia, Iritis - Both Eyes (6/10/2013), Kidney stone, Lymphedema, Morbid obesity with BMI of 40.0-44.9, adult (2/18/2015), Nephrolithiasis (4/20/2016), NS (nuclear sclerosis) (4/1/2013), Nuclear sclerosis - Both Eyes (4/29/2013), Preseptal cellulitis - Right Eye (4/29/2013), Proliferative diabetic retinopathy - Both Eyes (4/29/2013), Proliferative diabetic retinopathy, both eyes (4/1/2013), PSC (posterior subcapsular cataract) - Both Eyes (4/29/2013), S/P hernia repair (12/19/2012), TIA (transient ischemic attack) (11/18/2014), Tinea pedis (7/24/2012), Type 2 diabetes mellitus with diabetic polyneuropathy, with long-term current use of insulin (9/18/2015), Type 2 diabetes mellitus with renal manifestations, controlled (12/12/2013), Type 2 diabetes, controlled, with moderate nonproliferative diabetic retinopathy without macular edema (9/17/2015), Ulcer of left lower extremity, limited to breakdown of skin (7/8/2015), Unspecified cerebral artery occlusion with cerebral infarction (11/16/2014), Unspecified venous (peripheral) insufficiency, Ureteral stone with hydronephrosis (1/27/2016), UTI (lower urinary tract infection), Vaginal infection, and 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    She presents today due to  "weakness and black stools. Patient reports having black stools since 07/22. She had a large bowel movement today and since then her stomach has been feeling "bloated". She reports significant weakness with this as well. She denies history of bleeding from the stomach in the past shortness of breath fevers chest pain or new pains today. Patient states that she underwent surgery for veins on her legs on 06/30/2022. She reports taking a baby aspirin and Plavix at home. Denies SOB, CP, nausea, vomiting, or abdominal pain.    In the ED: patient was occult blood positive, Hgb 6.7, CXR without acute processes. Hemodynamically stable otherwise. Admitted to [facility] for further evaluation and GI work up.      Overview/Hospital Course:  No notes on file    Interval History: awake and alert, complaint of left arm swelling around the IV site  S/p EGD on 7/13 reports widely patent Schatzki ring and gastritis.   S/p  for colonoscopy on 7/14 with melena throughout the colon no bleeding source identified. S/p capsule endoscopy on 7/15 reported active bleeding proximal small bowel. Plan for push enteroscopy Vs balloon assisted enteroscopy on 7/18.          H/H stable      Review of Systems   Constitutional:  Negative for fatigue.   Cardiovascular:  Negative for palpitations.   Gastrointestinal:  Negative for blood in stool.   Genitourinary:  Negative for dysuria.   Skin:  Negative for color change.   Neurological:  Positive for weakness.   Psychiatric/Behavioral:  Negative for agitation.    Objective:     Vital Signs (Most Recent):  Temp: 96 °F (35.6 °C) (07/17/22 0855)  Pulse: 62 (07/17/22 0855)  Resp: 18 (07/17/22 0855)  BP: (!) 130/59 (07/17/22 0855)  SpO2: 100 % (07/17/22 0926)   Vital Signs (24h Range):  Temp:  [96 °F (35.6 °C)-98.4 °F (36.9 °C)] 96 °F (35.6 °C)  Pulse:  [60-65] 62  Resp:  [16-18] 18  SpO2:  [96 %-100 %] 100 %  BP: (130-184)/(59-80) 130/59     Weight: 127.2 kg (280 lb 6.8 oz)  Body mass index " is 43.92 kg/m².    Intake/Output Summary (Last 24 hours) at 7/17/2022 0939  Last data filed at 7/17/2022 0600  Gross per 24 hour   Intake 910 ml   Output 1550 ml   Net -640 ml        Physical Exam  Vitals and nursing note reviewed.   Constitutional:       General: She is not in acute distress.     Appearance: Normal appearance. She is obese. She is not ill-appearing or diaphoretic.   HENT:      Head: Normocephalic and atraumatic.      Mouth/Throat:      Mouth: Mucous membranes are dry.   Eyes:      Extraocular Movements: Extraocular movements intact.      Pupils: Pupils are equal, round, and reactive to light.   Cardiovascular:      Rate and Rhythm: Normal rate and regular rhythm.      Pulses: Normal pulses.      Heart sounds: Normal heart sounds.   Pulmonary:      Effort: Pulmonary effort is normal. No respiratory distress.      Breath sounds: Normal breath sounds.   Abdominal:      General: Bowel sounds are normal. There is no distension.      Palpations: Abdomen is soft.      Tenderness: There is no abdominal tenderness. There is no guarding.   Musculoskeletal:         General: No swelling. Normal range of motion.      Cervical back: Normal range of motion.   Skin:     General: Skin is warm.      Capillary Refill: Capillary refill takes 2 to 3 seconds.   Neurological:      General: No focal deficit present.      Mental Status: She is alert. Mental status is at baseline.   Psychiatric:         Mood and Affect: Mood normal.         Behavior: Behavior normal.         Thought Content: Thought content normal.       Significant Labs: A1C:   Recent Labs   Lab 06/30/22  0934   HGBA1C 8.4*       ABGs: No results for input(s): PH, PCO2, HCO3, POCSATURATED, BE, TOTALHB, COHB, METHB, O2HB, POCFIO2, PO2 in the last 48 hours.  Blood Culture: No results for input(s): LABBLOO in the last 48 hours.  BMP:   No results for input(s): GLU, NA, K, CL, CO2, BUN, CREATININE, CALCIUM, MG in the last 48 hours.    CBC:   Recent Labs   Lab  07/15/22  1926 07/16/22  0556 07/17/22  0500   WBC 8.87 4.03 4.10   HGB 7.9* 7.6* 7.7*   HCT 25.9* 24.3* 24.4*    199 206       Cardiac Markers:   No results for input(s): CKMB, MYOGLOBIN, BNP, TROPISTAT in the last 48 hours.    Lactic Acid: No results for input(s): LACTATE in the last 48 hours.  Lipase: No results for input(s): LIPASE in the last 48 hours.  Lipid Panel: No results for input(s): CHOL, HDL, LDLCALC, TRIG, CHOLHDL in the last 48 hours.  Magnesium: No results for input(s): MG in the last 48 hours.  Troponin:   No results for input(s): TROPONINI in the last 48 hours.    TSH: No results for input(s): TSH in the last 4320 hours.  Urine Culture: No results for input(s): LABURIN in the last 48 hours.  Urine Studies:   No results for input(s): COLORU, APPEARANCEUA, PHUR, SPECGRAV, PROTEINUA, GLUCUA, KETONESU, BILIRUBINUA, OCCULTUA, NITRITE, UROBILINOGEN, LEUKOCYTESUR, RBCUA, WBCUA, BACTERIA, SQUAMEPITHEL, HYALINECASTS in the last 48 hours.    Invalid input(s): WRIGHTSUR      Significant Imaging: I have reviewed all pertinent imaging results/findings within the past 24 hours.      Assessment/Plan:      * GIB (gastrointestinal bleeding)  Anemia of chronic disease  -Presented with weakness and melena, takes ASA and Plavix; Hb 6.7 from baseline 10; hemodynamically stable  -Underwent surgery for veins in legs on 6/30/22  -CXR: No evidence of acute chest disease since 2021.  -Occult blood positive  -GI bleed Pathway initiated  -2 LBIV  -Orthostatics ordered  -Initiated on protonix 40mg IV bid  -Hold anticoagulation  -Trend CBC- trend  -Transfuse unit PRBCs to keep Hb > 7  -NPO  -GI consulted- s/p EGD on 7/13 reports widely patent Schatzki ring and gastritis, s/p for colonoscopy on 7/14 with melena throughout the colon no bleeding source identified.   S/p capsule endoscopy 7/15 -  reported active bleeding proximal small bowel. Plan for push enteroscopy Vs balloon assisted enteroscopy on 7/18.     Obesity,  morbid (more than 100 lbs over ideal weight or BMI > 40)  -encourage lifestyle modifications (helathy diet, exercise)     (HFpEF) heart failure with preserved ejection fraction  No complaints of CP or SOB  Patient is identified as having Diastolic (HFpEF) heart failure that is Chronic. CHF is currently controlled. Latest ECHO performed and demonstrates- Results for orders placed during the hospital encounter of 11/21/20    Echo Color Flow Doppler? Yes    Interpretation Summary  · Mild left atrial enlargement.  · There is left ventricular eccentric hypertrophy.  · The left ventricle is normal in size with normal systolic function. The estimated ejection fraction is 60%.  · Indeterminate diastolic function.  · Normal right ventricular size with normal right ventricular systolic function.  · The estimated PA systolic pressure is 31 mmHg.  · Normal central venous pressure (3 mmHg).  Continue CCB and monitor clinical status closely. Monitor on telemetry. Patient is off CHF pathway.  Monitor strict Is&Os and daily weights. Continue to stress to patient importance of self efficacy and  on diet for CHF. Last BNP reviewed- and noted below   Recent Labs   Lab 07/12/22 2054   *       PAOD (peripheral arterial occlusive disease)  Patient takes ASA-hold with GIB       Wheelchair dependent  -fall precautions      Type 2 diabetes mellitus with diabetic polyneuropathy, with long-term current use of insulin  Hemoglobin A1C   Date Value Ref Range Status   06/30/2022 8.4 (H) 4.0 - 5.6 % Final   -Serum glucose on admit 295  -Initiate on detemir 10u daily   -LDSSI with ACCUcheck Q6  -NPO with GI work up  -Hypoglycemia protocol PRN  -Monitor closely and adjust insulin regimen as clinically indicated to achieve levels of 140-180 during hospitalization      Stage 4 chronic kidney disease  Creatine stable for now. BMP reviewed- noted Estimated Creatinine Clearance: 33.6 mL/min (A) (based on SCr of 1.8 mg/dL (H)). according to  latest data. Monitor UOP and serial BMP and adjust therapy as needed. Renally dose meds.      Essential hypertension  -Chronic, uncontrolled.  Latest blood pressure and vitals reviewed-   Temp:  [97.5 °F (36.4 °C)-97.8 °F (36.6 °C)]   Pulse:  [74-78]   Resp:  [18]   BP: (143-155)/(60-70)   SpO2:  [97 %-100 %] .   Home meds for hypertension were reviewed and noted below.   Hypertension Medications             NIFEdipine (PROCARDIA-XL) 60 MG (OSM) 24 hr tablet Take 1 tablet (60 mg total) by mouth once daily.      -While in the hospital, will manage blood pressure as follows; Continue home antihypertensive regimen  -Will utilize p.r.n. blood pressure medication only if patient's blood pressure greater than  180/110 and she develops symptoms such as worsening chest pain or shortness of breath.      Hyperlipidemia LDL goal <100   Patient is chronically on statin.will continue for now. Monitor clinically. Last LDL was   Lab Results   Component Value Date    LDLCALC 104.8 06/30/2022            VTE Risk Mitigation (From admission, onward)         Ordered     Place sequential compression device  Until discontinued         07/13/22 0004                Discharge Planning   GABE: 7/14/2022     Code Status: Prior   Is the patient medically ready for discharge?:     Reason for patient still in hospital (select all that apply): Patient trending condition  Discharge Plan A: Home                  Su Maddox MD  Department of Hospital Medicine   [place] [facility]

## 2022-07-17 NOTE — ANESTHESIA PREPROCEDURE EVALUATION
07/17/2022  Sherin Ortiz is a 77 y.o., female scheduled for enteroscopy.    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    Benign essential hypertension 11/14/2012    Cataract     s/p phacoemulsification    Chronic kidney disease (CKD), stage III (moderate) 9/12/2013    Diabetic peripheral neuropathy associated with type 2 diabetes mellitus 11/14/2014    causing right hemiparesis    Gait disorder     Hyperlipidemia     Iritis - Both Eyes 6/10/2013    Kidney stone     Lymphedema     Morbid obesity with BMI of 40.0-44.9, adult 2/18/2015    Nephrolithiasis 4/20/2016    NS (nuclear sclerosis) 4/1/2013    Nuclear sclerosis - Both Eyes 4/29/2013    Preseptal cellulitis - Right Eye 4/29/2013    Proliferative diabetic retinopathy - Both Eyes 4/29/2013    Proliferative diabetic retinopathy, both eyes 4/1/2013    PSC (posterior subcapsular cataract) - Both Eyes 4/29/2013    S/P hernia repair 12/19/2012    TIA (transient ischemic attack) 11/18/2014    Tinea pedis 7/24/2012    Tinea pedis is present on both feet.     Type 2 diabetes mellitus with diabetic polyneuropathy, with long-term current use of insulin 9/18/2015    Type 2 diabetes mellitus with renal manifestations, controlled 12/12/2013    Type 2 diabetes, controlled, with moderate nonproliferative diabetic retinopathy without macular edema 9/17/2015    Ulcer of left lower extremity, limited to breakdown of skin 7/8/2015    Unspecified cerebral artery occlusion with cerebral infarction 11/16/2014    Unspecified venous (peripheral) insufficiency     Ureteral stone with hydronephrosis 1/27/2016    UTI (lower urinary tract infection)     Vaginal infection     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       Past Surgical History:   Procedure Laterality Date    ABLATION Bilateral  6/23/2022    Procedure: Ablation;  Surgeon: Vahid Farfan MD;  Location: [facility] CATH LAB/EP;  Service: Cardiology;  Laterality: Bilateral;    APPENDECTOMY      CATARACT EXTRACTION W/  INTRAOCULAR LENS IMPLANT Left 5/21/2013    CATARACT EXTRACTION W/  INTRAOCULAR LENS IMPLANT Right 6/4/2013    CHOLECYSTECTOMY      COLONOSCOPY  12/22/2005    normal    COLONOSCOPY N/A 7/14/2022    Procedure: COLONOSCOPY;  Surgeon: Kannan Mace MD;  Location: [facility] ENDO;  Service: Endoscopy;  Laterality: N/A;    ESOPHAGOGASTRODUODENOSCOPY  12/21/2015    hiatal hernia, Schatzki ring    ESOPHAGOGASTRODUODENOSCOPY N/A 7/13/2022    Procedure: EGD (ESOPHAGOGASTRODUODENOSCOPY);  Surgeon: Kannan Mace MD;  Location: [facility] ENDO;  Service: Endoscopy;  Laterality: N/A;    EYE SURGERY Bilateral 2008    laser surgery both eyes    NASAL SEPTUM SURGERY      SUBTOTAL COLECTOMY  12/13/2012    transverse colon, for incarcerated umbilical hernia, Dr. Kat Bower           Pre-op Assessment    I have reviewed the Patient Summary Reports.     I have reviewed the Nursing Notes. I have reviewed the NPO Status.   I have reviewed the Medications.     Review of Systems  Anesthesia Hx:  No problems with previous Anesthesia  History of prior surgery of interest to airway management or planning: Denies Family Hx of Anesthesia complications.   Denies Personal Hx of Anesthesia complications.   Social:  Former Smoker    Hematology/Oncology:     Oncology Normal    -- Anemia:   EENT/Dental:EENT/Dental Normal   Cardiovascular:   Exercise tolerance: poor Hypertension  Denies Angina. PVD hyperlipidemia    Pulmonary:  Pulmonary Normal  Denies Shortness of breath.    Renal/:   Chronic Renal Disease (CKD 3)    Hepatic/GI:  Hepatic/GI Normal    Musculoskeletal:  Musculoskeletal Normal    Neurological:   TIA, CVA   Peripheral Neuropathy    Endocrine:   Diabetes, poorly controlled, type 2    Dermatological:   left hand cellulits   Psych:  Psychiatric  Normal           Physical Exam  General: Well nourished, Cooperative, Alert and Oriented    Airway:  Mallampati: III   Mouth Opening: Normal  TM Distance: 4 - 6 cm  Tongue: Normal  Neck ROM: Normal ROM    Dental:  Edentulous        Anesthesia Plan  Type of Anesthesia, risks & benefits discussed:    Anesthesia Type: Gen ETT, MAC  Intra-op Monitoring Plan: Standard ASA Monitors  Post Op Pain Control Plan: multimodal analgesia  Induction:  IV  Airway Plan: , Post-Induction  Informed Consent: Informed consent signed with the Patient and all parties understand the risks and agree with anesthesia plan.  All questions answered.   ASA Score: 3    Ready For Surgery From Anesthesia Perspective.     .

## 2022-07-17 NOTE — ASSESSMENT & PLAN NOTE
Anemia of chronic disease  -Presented with weakness and melena, takes ASA and Plavix; Hb 6.7 from baseline 10; hemodynamically stable  -Underwent surgery for veins in legs on 6/30/22  -CXR: No evidence of acute chest disease since 2021.  -Occult blood positive  -GI bleed Pathway initiated  -2 LBIV  -Orthostatics ordered  -Initiated on protonix 40mg IV bid  -Hold anticoagulation  -Trend CBC- trend  -Transfuse unit PRBCs to keep Hb > 7  -NPO  -GI consulted- s/p EGD on 7/13 reports widely patent Schatzki ring and gastritis, s/p for colonoscopy on 7/14 with melena throughout the colon no bleeding source identified.   S/p capsule endoscopy 7/15 -  reported active bleeding proximal small bowel. Plan for push enteroscopy Vs balloon assisted enteroscopy on 7/18.

## 2022-07-18 ENCOUNTER — ANESTHESIA (OUTPATIENT)
Dept: ENDOSCOPY | Facility: HOSPITAL | Age: 77
DRG: 394 | End: 2022-07-18
Payer: MEDICARE

## 2022-07-18 ENCOUNTER — PATIENT OUTREACH (OUTPATIENT)
Dept: ADMINISTRATIVE | Facility: OTHER | Age: 77
End: 2022-07-18
Payer: MEDICARE

## 2022-07-18 LAB
ANION GAP SERPL CALC-SCNC: 3 MMOL/L (ref 8–16)
BASOPHILS # BLD AUTO: 0.02 K/UL (ref 0–0.2)
BASOPHILS NFR BLD: 0.6 % (ref 0–1.9)
BUN SERPL-MCNC: 40 MG/DL (ref 8–23)
CALCIUM SERPL-MCNC: 8.5 MG/DL (ref 8.7–10.5)
CHLORIDE SERPL-SCNC: 117 MMOL/L (ref 95–110)
CO2 SERPL-SCNC: 22 MMOL/L (ref 23–29)
CREAT SERPL-MCNC: 1.3 MG/DL (ref 0.5–1.4)
CTP QC/QA: YES
DIFFERENTIAL METHOD: ABNORMAL
EOSINOPHIL # BLD AUTO: 0.1 K/UL (ref 0–0.5)
EOSINOPHIL NFR BLD: 3.2 % (ref 0–8)
ERYTHROCYTE [DISTWIDTH] IN BLOOD BY AUTOMATED COUNT: 15.5 % (ref 11.5–14.5)
EST. GFR  (AFRICAN AMERICAN): 45 ML/MIN/1.73 M^2
EST. GFR  (NON AFRICAN AMERICAN): 39 ML/MIN/1.73 M^2
GLUCOSE SERPL-MCNC: 164 MG/DL (ref 70–110)
HCT VFR BLD AUTO: 23.2 % (ref 37–48.5)
HGB BLD-MCNC: 7.3 G/DL (ref 12–16)
IMM GRANULOCYTES # BLD AUTO: 0 K/UL (ref 0–0.04)
IMM GRANULOCYTES NFR BLD AUTO: 0 % (ref 0–0.5)
LYMPHOCYTES # BLD AUTO: 1.1 K/UL (ref 1–4.8)
LYMPHOCYTES NFR BLD: 33.8 % (ref 18–48)
MCH RBC QN AUTO: 29.4 PG (ref 27–31)
MCHC RBC AUTO-ENTMCNC: 31.5 G/DL (ref 32–36)
MCV RBC AUTO: 94 FL (ref 82–98)
MONOCYTES # BLD AUTO: 0.3 K/UL (ref 0.3–1)
MONOCYTES NFR BLD: 8.4 % (ref 4–15)
NEUTROPHILS # BLD AUTO: 1.7 K/UL (ref 1.8–7.7)
NEUTROPHILS NFR BLD: 54 % (ref 38–73)
NRBC BLD-RTO: 0 /100 WBC
PLATELET # BLD AUTO: 193 K/UL (ref 150–450)
PMV BLD AUTO: 9.5 FL (ref 9.2–12.9)
POCT GLUCOSE: 157 MG/DL (ref 70–110)
POCT GLUCOSE: 179 MG/DL (ref 70–110)
POCT GLUCOSE: 217 MG/DL (ref 70–110)
POTASSIUM SERPL-SCNC: 4.4 MMOL/L (ref 3.5–5.1)
RBC # BLD AUTO: 2.48 M/UL (ref 4–5.4)
SARS-COV-2 AG RESP QL IA.RAPID: NEGATIVE
SODIUM SERPL-SCNC: 142 MMOL/L (ref 136–145)
WBC # BLD AUTO: 3.11 K/UL (ref 3.9–12.7)

## 2022-07-18 PROCEDURE — 25000003 PHARM REV CODE 250: Performed by: FAMILY MEDICINE

## 2022-07-18 PROCEDURE — 37000008 HC ANESTHESIA 1ST 15 MINUTES: Performed by: INTERNAL MEDICINE

## 2022-07-18 PROCEDURE — 63600175 PHARM REV CODE 636 W HCPCS: Performed by: NURSE PRACTITIONER

## 2022-07-18 PROCEDURE — A4216 STERILE WATER/SALINE, 10 ML: HCPCS | Performed by: NURSE PRACTITIONER

## 2022-07-18 PROCEDURE — 27201028 HC NEEDLE, SCLERO: Performed by: INTERNAL MEDICINE

## 2022-07-18 PROCEDURE — 27200997: Performed by: INTERNAL MEDICINE

## 2022-07-18 PROCEDURE — 25000003 PHARM REV CODE 250: Performed by: NURSE ANESTHETIST, CERTIFIED REGISTERED

## 2022-07-18 PROCEDURE — 36415 COLL VENOUS BLD VENIPUNCTURE: CPT | Performed by: ANESTHESIOLOGY

## 2022-07-18 PROCEDURE — 85025 COMPLETE CBC W/AUTO DIFF WBC: CPT | Performed by: NURSE PRACTITIONER

## 2022-07-18 PROCEDURE — 80048 BASIC METABOLIC PNL TOTAL CA: CPT | Performed by: ANESTHESIOLOGY

## 2022-07-18 PROCEDURE — 37000009 HC ANESTHESIA EA ADD 15 MINS: Performed by: INTERNAL MEDICINE

## 2022-07-18 PROCEDURE — 25000003 PHARM REV CODE 250: Performed by: NURSE PRACTITIONER

## 2022-07-18 PROCEDURE — 27201238 HC BALLOON, OVERTUBE (ANY): Performed by: INTERNAL MEDICINE

## 2022-07-18 PROCEDURE — 11000001 HC ACUTE MED/SURG PRIVATE ROOM

## 2022-07-18 PROCEDURE — 44378 SMALL BOWEL ENDOSCOPY: CPT | Performed by: INTERNAL MEDICINE

## 2022-07-18 PROCEDURE — 99900035 HC TECH TIME PER 15 MIN (STAT)

## 2022-07-18 PROCEDURE — C9113 INJ PANTOPRAZOLE SODIUM, VIA: HCPCS | Performed by: NURSE PRACTITIONER

## 2022-07-18 PROCEDURE — 94761 N-INVAS EAR/PLS OXIMETRY MLT: CPT

## 2022-07-18 PROCEDURE — 44799 UNLISTED PX SMALL INTESTINE: CPT | Performed by: INTERNAL MEDICINE

## 2022-07-18 PROCEDURE — 36415 COLL VENOUS BLD VENIPUNCTURE: CPT | Performed by: NURSE PRACTITIONER

## 2022-07-18 PROCEDURE — 63600175 PHARM REV CODE 636 W HCPCS: Performed by: NURSE ANESTHETIST, CERTIFIED REGISTERED

## 2022-07-18 PROCEDURE — 27202087 HC PROBE, APC: Performed by: INTERNAL MEDICINE

## 2022-07-18 PROCEDURE — 63600175 PHARM REV CODE 636 W HCPCS: Mod: JG | Performed by: NURSE PRACTITIONER

## 2022-07-18 RX ORDER — PANTOPRAZOLE SODIUM 40 MG/1
40 TABLET, DELAYED RELEASE ORAL DAILY
Qty: 30 TABLET | Refills: 11 | Status: SHIPPED | OUTPATIENT
Start: 2022-07-18 | End: 2022-08-24

## 2022-07-18 RX ORDER — NAPROXEN SODIUM 220 MG/1
81 TABLET, FILM COATED ORAL DAILY
Qty: 30 TABLET | Refills: 5 | Status: ON HOLD | OUTPATIENT
Start: 2022-07-20 | End: 2023-06-06 | Stop reason: HOSPADM

## 2022-07-18 RX ORDER — LIDOCAINE HCL/PF 100 MG/5ML
SYRINGE (ML) INTRAVENOUS
Status: DISCONTINUED | OUTPATIENT
Start: 2022-07-18 | End: 2022-07-18

## 2022-07-18 RX ORDER — PROPOFOL 10 MG/ML
VIAL (ML) INTRAVENOUS CONTINUOUS PRN
Status: DISCONTINUED | OUTPATIENT
Start: 2022-07-18 | End: 2022-07-18

## 2022-07-18 RX ORDER — PROPOFOL 10 MG/ML
VIAL (ML) INTRAVENOUS
Status: DISCONTINUED | OUTPATIENT
Start: 2022-07-18 | End: 2022-07-18

## 2022-07-18 RX ADMIN — MUPIROCIN: 20 OINTMENT TOPICAL at 10:07

## 2022-07-18 RX ADMIN — Medication 10 ML: at 10:07

## 2022-07-18 RX ADMIN — LISINOPRIL 10 MG: 10 TABLET ORAL at 09:07

## 2022-07-18 RX ADMIN — ATORVASTATIN CALCIUM 40 MG: 40 TABLET, FILM COATED ORAL at 08:07

## 2022-07-18 RX ADMIN — PANTOPRAZOLE SODIUM 40 MG: 40 INJECTION, POWDER, FOR SOLUTION INTRAVENOUS at 09:07

## 2022-07-18 RX ADMIN — PROPOFOL 30 MG: 10 INJECTION, EMULSION INTRAVENOUS at 10:07

## 2022-07-18 RX ADMIN — LIDOCAINE HYDROCHLORIDE 20 MG: 20 INJECTION, SOLUTION INTRAVENOUS at 10:07

## 2022-07-18 RX ADMIN — PROPOFOL 100 MCG/KG/MIN: 10 INJECTION, EMULSION INTRAVENOUS at 10:07

## 2022-07-18 RX ADMIN — NIFEDIPINE 60 MG: 30 TABLET, FILM COATED, EXTENDED RELEASE ORAL at 09:07

## 2022-07-18 RX ADMIN — MUPIROCIN: 20 OINTMENT TOPICAL at 09:07

## 2022-07-18 RX ADMIN — SODIUM CHLORIDE: 0.9 INJECTION, SOLUTION INTRAVENOUS at 03:07

## 2022-07-18 RX ADMIN — GLUCAGON HYDROCHLORIDE 0.5 MG: KIT at 10:07

## 2022-07-18 RX ADMIN — PANTOPRAZOLE SODIUM 40 MG: 40 INJECTION, POWDER, FOR SOLUTION INTRAVENOUS at 08:07

## 2022-07-18 RX ADMIN — SODIUM CHLORIDE: 0.9 INJECTION, SOLUTION INTRAVENOUS at 10:07

## 2022-07-18 NOTE — CONSULTS
[facility] - Telemetry  Wound Care    Patient Name:  Sherin Ortiz   MRN:  [id number]  Date: 7/18/2022  Diagnosis: GIB (gastrointestinal bleeding)    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    Benign essential hypertension 11/14/2012    Cataract     s/p phacoemulsification    Chronic kidney disease (CKD), stage III (moderate) 9/12/2013    Diabetic peripheral neuropathy associated with type 2 diabetes mellitus 11/14/2014    causing right hemiparesis    Gait disorder     Hyperlipidemia     Iritis - Both Eyes 6/10/2013    Kidney stone     Lymphedema     Morbid obesity with BMI of 40.0-44.9, adult 2/18/2015    Nephrolithiasis 4/20/2016    NS (nuclear sclerosis) 4/1/2013    Nuclear sclerosis - Both Eyes 4/29/2013    Preseptal cellulitis - Right Eye 4/29/2013    Proliferative diabetic retinopathy - Both Eyes 4/29/2013    Proliferative diabetic retinopathy, both eyes 4/1/2013    PSC (posterior subcapsular cataract) - Both Eyes 4/29/2013    S/P hernia repair 12/19/2012    TIA (transient ischemic attack) 11/18/2014    Tinea pedis 7/24/2012    Tinea pedis is present on both feet.     Type 2 diabetes mellitus with diabetic polyneuropathy, with long-term current use of insulin 9/18/2015    Type 2 diabetes mellitus with renal manifestations, controlled 12/12/2013    Type 2 diabetes, controlled, with moderate nonproliferative diabetic retinopathy without macular edema 9/17/2015    Ulcer of left lower extremity, limited to breakdown of skin 7/8/2015    Unspecified cerebral artery occlusion with cerebral infarction 11/16/2014    Unspecified venous (peripheral) insufficiency     Ureteral stone with hydronephrosis 1/27/2016    UTI (lower urinary tract infection)     Vaginal infection     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       Social History     Socioeconomic History    Marital status:    Tobacco Use    Smoking status:  Former Smoker     Packs/day: 0.50     Years: 15.00     Pack years: 7.50     Types: Cigarettes     Quit date: 1982     Years since quittin.0    Smokeless tobacco: Former User    Tobacco comment: smoked one pack per week   Substance and Sexual Activity    Alcohol use: No    Drug use: No    Sexual activity: Not Currently   Social History Narrative    Lives alone. Ambulatory via motorized wheelchair. Catches bus to come to Hospitals in [place].      Social Determinants of Health     Financial Resource Strain: Low Risk     Difficulty of Paying Living Expenses: Not very hard   Food Insecurity: No Food Insecurity    Worried About Running Out of Food in the Last Year: Never true    Ran Out of Food in the Last Year: Never true   Transportation Needs: No Transportation Needs    Lack of Transportation (Medical): No    Lack of Transportation (Non-Medical): No   Housing Stability: Low Risk     Unable to Pay for Housing in the Last Year: No    Number of Places Lived in the Last Year: 1    Unstable Housing in the Last Year: No       Precautions:     Allergies as of 2022 - Reviewed 2022   Allergen Reaction Noted    Penicillins Hives 2012    Sulfa (sulfonamide antibiotics) Other (See Comments) 2014       WO Assessment Details/Treatment     BLE compression wraps changed using 2 layer 30-40mm/Hg wraps obtained from outpt wound care.  BLE wounds are managed by Dr. Perry in the outpt wound clinic.    Wounds to L leg healed  R leg- open ulcer to proximal lower leg- xeroform applied.          2022

## 2022-07-18 NOTE — ANESTHESIA POSTPROCEDURE EVALUATION
Anesthesia Post Evaluation    Patient: Sherin Ortiz    Procedure(s) Performed: Procedure(s) (LRB):  ENTEROSCOPY Upper SBE (N/A)    Final Anesthesia Type: MAC      Patient location during evaluation: [facility]  Patient participation: Yes- Able to Participate  Level of consciousness: awake and alert, awake and oriented  Post-procedure vital signs: reviewed and stable  Pain management: adequate  Airway patency: patent    PONV status at discharge: No PONV  Anesthetic complications: no      Cardiovascular status: blood pressure returned to baseline, hemodynamically stable and stable  Respiratory status: spontaneous ventilation, unassisted and room air  Hydration status: euvolemic  Follow-up not needed.          Vitals Value Taken Time   /56 07/18/22 1011   Temp 36.5 °C (97.7 °F) 07/18/22 1011   Pulse 59 07/18/22 1011   Resp 20 07/18/22 1011   SpO2 100 % 07/18/22 1011         No case tracking events are documented in the log.      Pain/Phillip Score: No data recorded

## 2022-07-18 NOTE — PROGRESS NOTES
07/18/2022 @ 11:06AM - RSW attempted to meet with patient for initial visit. RSW unable to meet with pt due to pt at endo. RSW will follow up with patient at a later time.    07/18/2022 @ 1:48 PM- RSW attempted to meet with patient to complete SDOH and liaison assessment. RSW unable to complete initial visit due to pt sleeping and not responding to RSW attempt to wake pt up. RSW will follow up with patient at a later time.      IP Liaison - Initial Visit Note    Patient: hSerin Ortiz  MRN:  [id number]  Date of Service:  7/19/2022  Completed by:  ARIANNA Reid    Reason for Visit   Patient presents with    IP Liaison Initial Visit       RSW met with patient at bedside in order to complete SDOH questionnaire and liaison assessment.  Pt has identified no social barriers to care. Per pt, pt is not in need of resources at this time.    The following were addressed during this visit:  - Review SDOH Questions   - Complete patient assessment   - Complete initial visit with patient        Patient Summary     IP Liaison Patient Assessment    General  Level of Caregiver support: Member independent and does not need caregiver assistance  Have you had to make a decision between paying for any of the following in the last 2 months?: None  Transportation means: Bus  Assessments  Was the PHQ Depression Screening completed this visit?: No  Was the MARTI-7 Screening completed this visit?: No         ARIANNA Reid

## 2022-07-18 NOTE — PLAN OF CARE
Recommendation:   1. Advance diet as medically acceptable to CLD.  2. Addition of Chava BID to promote wound healing.   3. Monitor weight/labs.   4. RD to follow and monitor nutrition status    Goals:   Pt to receive nutrition by RD follow up    Nutrition Goal Status: other (comment) (continues)  Communication of RD Recs: other (comment) (POC)      Problem: Impaired Wound Healing  Goal: Optimal Wound Healing  Outcome: Ongoing, Progressing

## 2022-07-18 NOTE — ASSESSMENT & PLAN NOTE
Anemia of chronic disease  -Presented with weakness and melena, takes ASA and Plavix; Hb 6.7 from baseline 10; hemodynamically stable  -Underwent surgery for veins in legs on 6/30/22  -CXR: No evidence of acute chest disease since 2021.  -Occult blood positive  -GI bleed Pathway initiated  -2 LBIV  -Orthostatics ordered  -Initiated on protonix 40mg IV bid  -Hold anticoagulation  -Trend CBC- trend  -Transfuse unit PRBCs to keep Hb > 7  -NPO-  -GI consulted- s/p EGD on 7/13 reports widely patent Schatzki ring and gastritis, s/p for colonoscopy on 7/14 with melena throughout the colon no bleeding source identified.   S/p capsule endoscopy 7/15 -  reported active bleeding proximal small bowel.   Plan for push enteroscopy Vs balloon assisted enteroscopy on 7/18- reports dieulafoy lesion that we treated with APC and clips.

## 2022-07-18 NOTE — OR NURSING
Report received from attending Simi GARAY Patient NPO p Mn , vss Iv with NS infusing.   Patient will need Covid testing, orders placed for Rn to collect .

## 2022-07-18 NOTE — PROGRESS NOTES
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), Benign essential hypertension (11/14/2012), Cataract, Chronic kidney disease (CKD), stage III (moderate) (9/12/2013), Diabetic peripheral neuropathy associated with type 2 diabetes mellitus (11/14/2014), Gait disorder, Hyperlipidemia, Iritis - Both Eyes (6/10/2013), Kidney stone, Lymphedema, Morbid obesity with BMI of 40.0-44.9, adult (2/18/2015), Nephrolithiasis (4/20/2016), NS (nuclear sclerosis) (4/1/2013), Nuclear sclerosis - Both Eyes (4/29/2013), Preseptal cellulitis - Right Eye (4/29/2013), Proliferative diabetic retinopathy - Both Eyes (4/29/2013), Proliferative diabetic retinopathy, both eyes (4/1/2013), PSC (posterior subcapsular cataract) - Both Eyes (4/29/2013), S/P hernia repair (12/19/2012), TIA (transient ischemic attack) (11/18/2014), Tinea pedis (7/24/2012), Type 2 diabetes mellitus with diabetic polyneuropathy, with long-term current use of insulin (9/18/2015), Type 2 diabetes mellitus with renal manifestations, controlled (12/12/2013), Type 2 diabetes, controlled, with moderate nonproliferative diabetic retinopathy without macular edema (9/17/2015), Ulcer of left lower extremity, limited to breakdown of skin (7/8/2015), Unspecified cerebral artery occlusion with cerebral infarction (11/16/2014), Unspecified venous (peripheral) insufficiency, Ureteral stone with hydronephrosis (1/27/2016), UTI (lower urinary tract infection), Vaginal infection, and 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    She presents  "today due to weakness and black stools. Patient reports having black stools since 07/22. She had a large bowel movement today and since then her stomach has been feeling "bloated". She reports significant weakness with this as well. She denies history of bleeding from the stomach in the past shortness of breath fevers chest pain or new pains today. Patient states that she underwent surgery for veins on her legs on 06/30/2022. She reports taking a baby aspirin and Plavix at home. Denies SOB, CP, nausea, vomiting, or abdominal pain.    In the ED: patient was occult blood positive, Hgb 6.7, CXR without acute processes. Hemodynamically stable otherwise. Admitted to [facility] for further evaluation and GI work up.      Overview/Hospital Course:  No notes on file    Interval History: awake and alert,   S/p EGD on 7/13 reports widely patent Schatzki ring and gastritis.   S/p  for colonoscopy on 7/14 with melena throughout the colon no bleeding source identified.   S/p capsule endoscopy on 7/15 reported active bleeding proximal small bowel.   S/p push enteroscopy on 7/18, reported dieulafoy lesion that we treated with APC and clips.    H/H stable  Discharge home today      Review of Systems   Constitutional:  Negative for fatigue.   Cardiovascular:  Negative for palpitations.   Gastrointestinal:  Negative for blood in stool.   Genitourinary:  Negative for dysuria.   Skin:  Negative for color change.   Neurological:  Positive for weakness.   Psychiatric/Behavioral:  Negative for agitation.    Objective:     Vital Signs (Most Recent):  Temp: (!) 0.9 °F (-17.3 °C) (07/18/22 1047)  Pulse: (!) 54 (07/18/22 1047)  Resp: 19 (07/18/22 1047)  BP: (!) 105/45 (07/18/22 1047)  SpO2: (!) 93 % (07/18/22 1047)   Vital Signs (24h Range):  Temp:  [0.9 °F (-17.3 °C)-98 °F (36.7 °C)] 0.9 °F (-17.3 °C)  Pulse:  [54-60] 54  Resp:  [18-20] 19  SpO2:  [93 %-100 %] 93 %  BP: (105-175)/(45-74) 105/45     Weight: 127.2 kg (280 lb 6.8 " oz)  Body mass index is 43.92 kg/m².    Intake/Output Summary (Last 24 hours) at 7/18/2022 1102  Last data filed at 7/18/2022 0348  Gross per 24 hour   Intake 10 ml   Output 2250 ml   Net -2240 ml        Physical Exam  Vitals and nursing note reviewed.   Constitutional:       General: She is not in acute distress.     Appearance: Normal appearance. She is obese. She is not ill-appearing or diaphoretic.   HENT:      Head: Normocephalic and atraumatic.      Mouth/Throat:      Mouth: Mucous membranes are dry.   Eyes:      Extraocular Movements: Extraocular movements intact.      Pupils: Pupils are equal, round, and reactive to light.   Cardiovascular:      Rate and Rhythm: Normal rate and regular rhythm.      Pulses: Normal pulses.      Heart sounds: Normal heart sounds.   Pulmonary:      Effort: Pulmonary effort is normal. No respiratory distress.      Breath sounds: Normal breath sounds.   Abdominal:      General: Bowel sounds are normal. There is no distension.      Palpations: Abdomen is soft.      Tenderness: There is no abdominal tenderness. There is no guarding.   Musculoskeletal:         General: No swelling. Normal range of motion.      Cervical back: Normal range of motion.   Skin:     General: Skin is warm.      Capillary Refill: Capillary refill takes 2 to 3 seconds.   Neurological:      General: No focal deficit present.      Mental Status: She is alert. Mental status is at baseline.   Psychiatric:         Mood and Affect: Mood normal.         Behavior: Behavior normal.         Thought Content: Thought content normal.       Significant Labs: A1C:   Recent Labs   Lab 06/30/22  0934   HGBA1C 8.4*       ABGs: No results for input(s): PH, PCO2, HCO3, POCSATURATED, BE, TOTALHB, COHB, METHB, O2HB, POCFIO2, PO2 in the last 48 hours.  Blood Culture: No results for input(s): LABBLOO in the last 48 hours.  BMP:   Recent Labs   Lab 07/18/22  0735   *      K 4.4   *   CO2 22*   BUN 40*   CREATININE  1.3   CALCIUM 8.5*       CBC:   Recent Labs   Lab 07/17/22  0500 07/18/22  0501   WBC 4.10 3.11*   HGB 7.7* 7.3*   HCT 24.4* 23.2*    193       Cardiac Markers:   No results for input(s): CKMB, MYOGLOBIN, BNP, TROPISTAT in the last 48 hours.    Lactic Acid: No results for input(s): LACTATE in the last 48 hours.  Lipase: No results for input(s): LIPASE in the last 48 hours.  Lipid Panel: No results for input(s): CHOL, HDL, LDLCALC, TRIG, CHOLHDL in the last 48 hours.  Magnesium: No results for input(s): MG in the last 48 hours.  Troponin:   No results for input(s): TROPONINI in the last 48 hours.    TSH: No results for input(s): TSH in the last 4320 hours.  Urine Culture: No results for input(s): LABURIN in the last 48 hours.  Urine Studies:   No results for input(s): COLORU, APPEARANCEUA, PHUR, SPECGRAV, PROTEINUA, GLUCUA, KETONESU, BILIRUBINUA, OCCULTUA, NITRITE, UROBILINOGEN, LEUKOCYTESUR, RBCUA, WBCUA, BACTERIA, SQUAMEPITHEL, HYALINECASTS in the last 48 hours.    Invalid input(s): KIARASUR      Significant Imaging: I have reviewed all pertinent imaging results/findings within the past 24 hours.      Assessment/Plan:      * GIB (gastrointestinal bleeding)  Anemia of chronic disease  -Presented with weakness and melena, takes ASA and Plavix; Hb 6.7 from baseline 10; hemodynamically stable  -Underwent surgery for veins in legs on 6/30/22  -CXR: No evidence of acute chest disease since 2021.  -Occult blood positive  -GI bleed Pathway initiated  -2 LBIV  -Orthostatics ordered  -Initiated on protonix 40mg IV bid  -Hold anticoagulation  -Trend CBC- trend  -Transfuse unit PRBCs to keep Hb > 7  -NPO-  -GI consulted- s/p EGD on 7/13 reports widely patent Schatzki ring and gastritis, s/p for colonoscopy on 7/14 with melena throughout the colon no bleeding source identified.   S/p capsule endoscopy 7/15 -  reported active bleeding proximal small bowel.   Plan for push enteroscopy Vs balloon assisted enteroscopy on  7/18- reports dieulafoy lesion that we treated with APC and clips.    Obesity, morbid (more than 100 lbs over ideal weight or BMI > 40)  -encourage lifestyle modifications (healthy diet, exercise)     (HFpEF) heart failure with preserved ejection fraction  No complaints of CP or SOB  Patient is identified as having Diastolic (HFpEF) heart failure that is Chronic. CHF is currently controlled. Latest ECHO performed and demonstrates- Results for orders placed during the hospital encounter of 11/21/20    Echo Color Flow Doppler? Yes    Interpretation Summary  · Mild left atrial enlargement.  · There is left ventricular eccentric hypertrophy.  · The left ventricle is normal in size with normal systolic function. The estimated ejection fraction is 60%.  · Indeterminate diastolic function.  · Normal right ventricular size with normal right ventricular systolic function.  · The estimated PA systolic pressure is 31 mmHg.  · Normal central venous pressure (3 mmHg).  Continue CCB and monitor clinical status closely. Monitor on telemetry. Patient is off CHF pathway.  Monitor strict Is&Os and daily weights. Continue to stress to patient importance of self efficacy and  on diet for CHF. Last BNP reviewed- and noted below   Recent Labs   Lab 07/12/22 2054   *       PAOD (peripheral arterial occlusive disease)  Patient takes ASA-hold with GIB   Resume at discharge      Wheelchair dependent  -fall precautions      Type 2 diabetes mellitus with diabetic polyneuropathy, with long-term current use of insulin  Hemoglobin A1C   Date Value Ref Range Status   06/30/2022 8.4 (H) 4.0 - 5.6 % Final   -Serum glucose on admit 295  -Initiate on detemir 10u daily   -LDSSI with ACCUcheck Q6  -NPO with GI work up  -Hypoglycemia protocol PRN  -Monitor closely and adjust insulin regimen as clinically indicated to achieve levels of 140-180 during hospitalization      Stage 4 chronic kidney disease  Creatine stable for now. BMP reviewed-  noted Estimated Creatinine Clearance: 48.6 mL/min (based on SCr of 1.3 mg/dL). according to latest data. Monitor UOP and serial BMP and adjust therapy as needed. Renally dose meds.      Essential hypertension  -Chronic, uncontrolled.  Latest blood pressure and vitals reviewed-   Temp:  [0.9 °F (-17.3 °C)-98 °F (36.7 °C)]   Pulse:  [54-60]   Resp:  [18-20]   BP: (105-175)/(45-74)   SpO2:  [93 %-100 %] .   Home meds for hypertension were reviewed and noted below.   Hypertension Medications             NIFEdipine (PROCARDIA-XL) 60 MG (OSM) 24 hr tablet Take 1 tablet (60 mg total) by mouth once daily.      -While in the hospital, will manage blood pressure as follows; Continue home antihypertensive regimen  -Will utilize p.r.n. blood pressure medication only if patient's blood pressure greater than  180/110 and she develops symptoms such as worsening chest pain or shortness of breath.      Hyperlipidemia LDL goal <100   Patient is chronically on statin.will continue for now. Monitor clinically. Last LDL was   Lab Results   Component Value Date    LDLCALC 104.8 06/30/2022            VTE Risk Mitigation (From admission, onward)         Ordered     Place sequential compression device  Until discontinued         07/13/22 0004                Discharge Planning   GABE: 7/14/2022     Code Status: Prior   Is the patient medically ready for discharge?:     Reason for patient still in hospital (select all that apply): Pending disposition  Discharge Plan A: Home                  Su Maddox MD  Department of Hospital Medicine   [place] [facility] ([facility])

## 2022-07-18 NOTE — PROGRESS NOTES
"[place] [facility]  Adult Nutrition  Progress Note    SUMMARY       Recommendations    Recommendation:   1. Advance diet as medically acceptable to CLD.  2. Addition of Chava BID to promote wound healing.   3. Monitor weight/labs.   4. RD to follow and monitor nutrition status    Goals:   Pt to receive nutrition by RD follow up    Nutrition Goal Status: other (comment) (continues)  Communication of RD Recs: other (comment) (POC)    Assessment and Plan    * GIB (gastrointestinal bleeding)  Contributing Nutrition Diagnosis  Inadequate oral intake    Related to (etiology):   GIB    Signs and Symptoms (as evidenced by):   Pt admitted with GIB, s/p EGD pending colonoscopy. Pt also with increased protein needs r/t wound healing, BLE wounds. S/p surgical intervention for veins 2 weeks PTA.      Interventions:  Collaboration with other providers  Modified Beverage- Chava BID    Nutrition Diagnosis Status:   Continues             Reason for Assessment    Reason For Assessment: RD follow-up  Diagnosis: other (see comments) (GIB)  Relevant Medical History: lymphedema, HLD, CKD stage 4, TIA, morbid obesity, TIA, DMT2, kidney stone, nephrolitiasis, cholecystectomy, subtotal colectomy  Interdisciplinary Rounds: did not attend  General Information Comments: Currently NPO, previously receiving diabetic 2000 kcal, with % intake. Receiving NaCl at 75 mL/hr. Per chart, "GI consulted- s/p EGD on 7/13 reports widely patent Schatzki ring and gastritis, s/p for colonoscopy on 7/14 with melena throughout the colon no bleeding source identified.   S/p capsule endoscopy 7/15 -  reported active bleeding proximal small bowel.   Plan for push enteroscopy Vs balloon assisted enteroscopy on 7/18- reports dieulafoy lesion that we treated with APC and clips." PIV. Freddie Score: 16 NFPE not completed 2/2 pt GERMAN at endo.  Nutrition Discharge Planning: d/c to be determined    Nutrition Risk Screen    Nutrition Risk Screen: no indicators " "present    Nutrition/Diet History    Food Preferences: [facility]  Spiritual, Cultural Beliefs, [employer] Practices, Values that Affect Care: no  Food Allergies: NKFA  Factors Affecting Nutritional Intake: NPO, altered gastrointestinal function    Anthropometrics    Temp: 98.5 °F (36.9 °C)  Height Method: Stated  Height: 5' 7" (170.2 cm)  Height (inches): 67 in  Weight Method: Bed Scale  Weight: 127.2 kg (280 lb 6.8 oz)  Weight (lb): 280.43 lb  Ideal Body Weight (IBW), Female: 135 lb  % Ideal Body Weight, Female (lb): 192.59 %  BMI (Calculated): 43.9  BMI Grade: greater than 40 - morbid obesity       Lab/Procedures/Meds    Pertinent Labs Reviewed: reviewed  Pertinent Labs Comments: Cl 117, CO2 22, BUN 40, eGFR 39, Glu 164, Ca 8.5  Pertinent Medications Reviewed: reviewed  Pertinent Medications Comments: statin, lisinopril, niefedipine, pantoprazole, NaCl at 75 mL/hr      Estimated/Assessed Needs    Weight Used For Calorie Calculations: 117.9 kg (259 lb 14.8 oz)  Energy Calorie Requirements (kcal): 1687  Energy Need Method: [place]-St Jeor (x 1.0 2/2 BMI > 40)  Protein Requirements: 71-95 (0.6-0.8 gm/kg 2/2 BMI > 40 and CKD stage 4)  Weight Used For Protein Calculations: 117.9 kg (259 lb 14.8 oz)     Estimated Fluid Requirement Method: RDA Method (or PER MD)  RDA Method (mL): 1687  CHO Requirement: 190 gm/day      Nutrition Prescription Ordered    Current Diet Order: NPO    Evaluation of Received Nutrient/Fluid Intake    IV Fluid (mL): 1800 (NaCl)  I/O: -2240  Energy Calories Required: not meeting needs  Protein Required: not meeting needs  Fluid Required: meeting needs  Comments: LBM 7/14  % Intake of Estimated Energy Needs: 0 - 25 %  % Meal Intake: NPO    Nutrition Risk    Level of Risk/Frequency of Follow-up:  (2x/week)     Monitor and Evaluation    Food and Nutrient Intake: energy intake, food and beverage intake  Food and Nutrient Adminstration: diet order  Knowledge/Beliefs/Attitudes: food and nutrition " knowledge/skill  Physical Activity and Function: nutrition-related ADLs and IADLs  Anthropometric Measurements: weight, weight change, body mass index  Biochemical Data, Medical Tests and Procedures: electrolyte and renal panel, gastrointestinal profile, glucose/endocrine profile, lipid profile, inflammatory profile     Nutrition Follow-Up    RD Follow-up?: Yes

## 2022-07-18 NOTE — SUBJECTIVE & OBJECTIVE
Interval History: awake and alert,   S/p EGD on 7/13 reports widely patent Schatzki ring and gastritis.   S/p  for colonoscopy on 7/14 with melena throughout the colon no bleeding source identified.   S/p capsule endoscopy on 7/15 reported active bleeding proximal small bowel.   S/p push enteroscopy on 7/18, reported dieulafoy lesion that we treated with APC and clips.    H/H stable  Discharge home today      Review of Systems   Constitutional:  Negative for fatigue.   Cardiovascular:  Negative for palpitations.   Gastrointestinal:  Negative for blood in stool.   Genitourinary:  Negative for dysuria.   Skin:  Negative for color change.   Neurological:  Positive for weakness.   Psychiatric/Behavioral:  Negative for agitation.    Objective:     Vital Signs (Most Recent):  Temp: (!) 0.9 °F (-17.3 °C) (07/18/22 1047)  Pulse: (!) 54 (07/18/22 1047)  Resp: 19 (07/18/22 1047)  BP: (!) 105/45 (07/18/22 1047)  SpO2: (!) 93 % (07/18/22 1047)   Vital Signs (24h Range):  Temp:  [0.9 °F (-17.3 °C)-98 °F (36.7 °C)] 0.9 °F (-17.3 °C)  Pulse:  [54-60] 54  Resp:  [18-20] 19  SpO2:  [93 %-100 %] 93 %  BP: (105-175)/(45-74) 105/45     Weight: 127.2 kg (280 lb 6.8 oz)  Body mass index is 43.92 kg/m².    Intake/Output Summary (Last 24 hours) at 7/18/2022 1102  Last data filed at 7/18/2022 0348  Gross per 24 hour   Intake 10 ml   Output 2250 ml   Net -2240 ml        Physical Exam  Vitals and nursing note reviewed.   Constitutional:       General: She is not in acute distress.     Appearance: Normal appearance. She is obese. She is not ill-appearing or diaphoretic.   HENT:      Head: Normocephalic and atraumatic.      Mouth/Throat:      Mouth: Mucous membranes are dry.   Eyes:      Extraocular Movements: Extraocular movements intact.      Pupils: Pupils are equal, round, and reactive to light.   Cardiovascular:      Rate and Rhythm: Normal rate and regular rhythm.      Pulses: Normal pulses.      Heart sounds: Normal heart sounds.    Pulmonary:      Effort: Pulmonary effort is normal. No respiratory distress.      Breath sounds: Normal breath sounds.   Abdominal:      General: Bowel sounds are normal. There is no distension.      Palpations: Abdomen is soft.      Tenderness: There is no abdominal tenderness. There is no guarding.   Musculoskeletal:         General: No swelling. Normal range of motion.      Cervical back: Normal range of motion.   Skin:     General: Skin is warm.      Capillary Refill: Capillary refill takes 2 to 3 seconds.   Neurological:      General: No focal deficit present.      Mental Status: She is alert. Mental status is at baseline.   Psychiatric:         Mood and Affect: Mood normal.         Behavior: Behavior normal.         Thought Content: Thought content normal.       Significant Labs: A1C:   Recent Labs   Lab 06/30/22  0934   HGBA1C 8.4*       ABGs: No results for input(s): PH, PCO2, HCO3, POCSATURATED, BE, TOTALHB, COHB, METHB, O2HB, POCFIO2, PO2 in the last 48 hours.  Blood Culture: No results for input(s): LABBLOO in the last 48 hours.  BMP:   Recent Labs   Lab 07/18/22  0735   *      K 4.4   *   CO2 22*   BUN 40*   CREATININE 1.3   CALCIUM 8.5*       CBC:   Recent Labs   Lab 07/17/22  0500 07/18/22  0501   WBC 4.10 3.11*   HGB 7.7* 7.3*   HCT 24.4* 23.2*    193       Cardiac Markers:   No results for input(s): CKMB, MYOGLOBIN, BNP, TROPISTAT in the last 48 hours.    Lactic Acid: No results for input(s): LACTATE in the last 48 hours.  Lipase: No results for input(s): LIPASE in the last 48 hours.  Lipid Panel: No results for input(s): CHOL, HDL, LDLCALC, TRIG, CHOLHDL in the last 48 hours.  Magnesium: No results for input(s): MG in the last 48 hours.  Troponin:   No results for input(s): TROPONINI in the last 48 hours.    TSH: No results for input(s): TSH in the last 4320 hours.  Urine Culture: No results for input(s): LABURIN in the last 48 hours.  Urine Studies:   No results for  input(s): COLORU, APPEARANCEUA, PHUR, SPECGRAV, PROTEINUA, GLUCUA, KETONESU, BILIRUBINUA, OCCULTUA, NITRITE, UROBILINOGEN, LEUKOCYTESUR, RBCUA, WBCUA, BACTERIA, SQUAMEPITHEL, HYALINECASTS in the last 48 hours.    Invalid input(s): MIRYAM      Significant Imaging: I have reviewed all pertinent imaging results/findings within the past 24 hours.

## 2022-07-18 NOTE — PLAN OF CARE
Plan of care reviewed with pt. Pt voiced understanding. Pt shows no acute distress. NSR on monitor. Bed alarm on for pt safety.

## 2022-07-18 NOTE — ASSESSMENT & PLAN NOTE
-Chronic, uncontrolled.  Latest blood pressure and vitals reviewed-   Temp:  [0.9 °F (-17.3 °C)-98 °F (36.7 °C)]   Pulse:  [54-60]   Resp:  [18-20]   BP: (105-175)/(45-74)   SpO2:  [93 %-100 %] .   Home meds for hypertension were reviewed and noted below.   Hypertension Medications             NIFEdipine (PROCARDIA-XL) 60 MG (OSM) 24 hr tablet Take 1 tablet (60 mg total) by mouth once daily.      -While in the hospital, will manage blood pressure as follows; Continue home antihypertensive regimen  -Will utilize p.r.n. blood pressure medication only if patient's blood pressure greater than  180/110 and she develops symptoms such as worsening chest pain or shortness of breath.

## 2022-07-18 NOTE — PROVATION PATIENT INSTRUCTIONS
Discharge Summary/Instructions after an Endoscopic Procedure  Patient Name: Sherin Ortiz  Patient MRN: [id number]  Patient YOB: 1945 Monday, July 18, 2022  Salo Frank MD  Dear patient,  As a result of recent federal legislation (The Federal Cures Act), you may   receive lab or pathology results from your procedure in your [facility]   account before your physician is able to contact you. Your physician or   their representative will relay the results to you with their   recommendations at their soonest availability.  Thank you,  Your health is very important to us during the Covid Crisis. Following your   procedure today, you will receive a daily text for 2 weeks asking about   signs or symptoms of Covid 19.  Please respond to this text when you   receive it so we can follow up and keep you as safe as possible.   RESTRICTIONS:  During your procedure today, you received medications for sedation.  These   medications may affect your judgment, balance and coordination.  Therefore,   for 24 hours, you have the following restrictions:   - DO NOT drive a car, operate machinery, make legal/financial decisions,   sign important papers or drink alcohol.    ACTIVITY:  Today: no heavy lifting, straining or running due to procedural   sedation/anesthesia.  The following day: return to full activity including work.  DIET:  Eat and drink normally unless instructed otherwise.     TREATMENT FOR COMMON SIDE EFFECTS:  - Mild abdominal pain, nausea, belching, bloating or excessive gas:  rest,   eat lightly and use a heating pad.  - Sore Throat: treat with throat lozenges and/or gargle with warm salt   water.  - Because air was used during the procedure, expelling large amounts of air   from your rectum or belching is normal.  - If a bowel prep was taken, you may not have a bowel movement for 1-3 days.    This is normal.  SYMPTOMS TO WATCH FOR AND REPORT TO YOUR PHYSICIAN:  1. Abdominal pain or bloating, other than gas  cramps.  2. Chest pain.  3. Back pain.  4. Signs of infection such as: chills or fever occurring within 24 hours   after the procedure.  5. Rectal bleeding, which would show as bright red, maroon, or black stools.   (A tablespoon of blood from the rectum is not serious, especially if   hemorrhoids are present.)  6. Vomiting.  7. Weakness or dizziness.  GO DIRECTLY TO THE NEAREST EMERGENCY ROOM IF YOU HAVE ANY OF THE FOLLOWING:      Difficulty breathing              Chills and/or fever over 101 F   Persistent vomiting and/or vomiting blood   Severe abdominal pain   Severe chest pain   Black, tarry stools   Bleeding- more than one tablespoon   Any other symptom or condition that you feel may need urgent attention  Your doctor recommends these additional instructions:  If any biopsies were taken, your doctors clinic will contact you in 1 to 2   weeks with any results.  Discharge patient to home today from a GI standpoint  For questions, problems or results please call your physician - Salo Frank MD.  EMERGENCY PHONE NUMBER: [phone],  LAB RESULTS: [phone]  IF A COMPLICATION OR EMERGENCY SITUATION ARISES AND YOU ARE UNABLE TO REACH   YOUR PHYSICIAN - GO DIRECTLY TO THE EMERGENCY ROOM.  MD Salo Lisa MD  7/18/2022 10:49:43 AM  This report has been verified and signed electronically.  Dear patient,  As a result of recent federal legislation (The Federal Cures Act), you may   receive lab or pathology results from your procedure in your [facility]   account before your physician is able to contact you. Your physician or   their representative will relay the results to you with their   recommendations at their soonest availability.  Thank you,  PROVATION

## 2022-07-18 NOTE — PLAN OF CARE
SW met with pt via uMentioned to discuss dc planning.     Pt confirmed home address. Pt reported upon dc she will dc to home and resume HH services with [facility]. Pt reported upon dc her daughter will transport her home. Pt was made aware to contact SW with any questions or concerns.  SW sent HH referral via carport to resume HH services.     SW will continue to follow pt throughout her transitions of care and assist with any dc needs.     Future Appointments   Date Time Provider Department Center   7/25/2022  1:00 PM Adrienne Chisholm MD [facility] IMPRI [facility]   8/19/2022  8:05 AM LABRED LAB [place]   8/19/2022  8:30 AM SPECIMEN, JULIANE TRUONG SPECLAB [place]   8/23/2022  1:30 PM Katie Knott MD [facility] BARIAT [facility]   10/5/2022 10:30 AM Deedee Calderon MD [facility] IM [facility] PCW   10/11/2022  1:15 PM Marlin Adams DPM [facility] POD [facility]        07/18/22 1222   Post-Acute Status   Post-Acute Authorization Home Health

## 2022-07-18 NOTE — ASSESSMENT & PLAN NOTE
Creatine stable for now. BMP reviewed- noted Estimated Creatinine Clearance: 48.6 mL/min (based on SCr of 1.3 mg/dL). according to latest data. Monitor UOP and serial BMP and adjust therapy as needed. Renally dose meds.

## 2022-07-18 NOTE — PLAN OF CARE
Patient recovered to baseline. Md spoke with patient regarding procedural results . Attending RN notified of procedural results, VSS.  Verbal order to transfer back to unit.   Attending RN  Notified of  GI recommendations

## 2022-07-18 NOTE — TRANSFER OF CARE
"Anesthesia Transfer of Care Note    Patient: Sherin Ortiz    Procedure(s) Performed: Procedure(s) (LRB):  ENTEROSCOPY Upper SBE (N/A)    Patient location: Other: endo pacu    Anesthesia Type: MAC    Transport from OR: Transported from OR on room air with adequate spontaneous ventilation    Post pain: adequate analgesia    Post assessment: no apparent anesthetic complications    Post vital signs: stable    Level of consciousness: awake and alert    Nausea/Vomiting: no nausea/vomiting    Complications: none    Transfer of care protocol was followed      Last vitals:   Visit Vitals  BP (!) 149/56   Pulse (!) 59   Temp 36.5 °C (97.7 °F) (Temporal)   Resp 20   Ht 5' 7" (1.702 m)   Wt 127.2 kg (280 lb 6.8 oz)   LMP  (LMP Unknown)   SpO2 100%   BMI 43.92 kg/m²     "

## 2022-07-19 VITALS
HEART RATE: 58 BPM | RESPIRATION RATE: 14 BRPM | HEIGHT: 67 IN | SYSTOLIC BLOOD PRESSURE: 154 MMHG | WEIGHT: 283.06 LBS | OXYGEN SATURATION: 100 % | BODY MASS INDEX: 44.43 KG/M2 | DIASTOLIC BLOOD PRESSURE: 67 MMHG | TEMPERATURE: 97 F

## 2022-07-19 LAB
BASOPHILS # BLD AUTO: 0.01 K/UL (ref 0–0.2)
BASOPHILS NFR BLD: 0.2 % (ref 0–1.9)
DIFFERENTIAL METHOD: ABNORMAL
EOSINOPHIL # BLD AUTO: 0.1 K/UL (ref 0–0.5)
EOSINOPHIL NFR BLD: 2 % (ref 0–8)
ERYTHROCYTE [DISTWIDTH] IN BLOOD BY AUTOMATED COUNT: 15.9 % (ref 11.5–14.5)
HCT VFR BLD AUTO: 24.2 % (ref 37–48.5)
HGB BLD-MCNC: 7.7 G/DL (ref 12–16)
IMM GRANULOCYTES # BLD AUTO: 0.01 K/UL (ref 0–0.04)
IMM GRANULOCYTES NFR BLD AUTO: 0.2 % (ref 0–0.5)
LYMPHOCYTES # BLD AUTO: 0.8 K/UL (ref 1–4.8)
LYMPHOCYTES NFR BLD: 17.6 % (ref 18–48)
MCH RBC QN AUTO: 29.1 PG (ref 27–31)
MCHC RBC AUTO-ENTMCNC: 31.8 G/DL (ref 32–36)
MCV RBC AUTO: 91 FL (ref 82–98)
MONOCYTES # BLD AUTO: 0.3 K/UL (ref 0.3–1)
MONOCYTES NFR BLD: 7.3 % (ref 4–15)
NEUTROPHILS # BLD AUTO: 3.3 K/UL (ref 1.8–7.7)
NEUTROPHILS NFR BLD: 72.7 % (ref 38–73)
NRBC BLD-RTO: 0 /100 WBC
PLATELET # BLD AUTO: 200 K/UL (ref 150–450)
PMV BLD AUTO: 9.7 FL (ref 9.2–12.9)
POCT GLUCOSE: 177 MG/DL (ref 70–110)
POCT GLUCOSE: 181 MG/DL (ref 70–110)
RBC # BLD AUTO: 2.65 M/UL (ref 4–5.4)
WBC # BLD AUTO: 4.54 K/UL (ref 3.9–12.7)

## 2022-07-19 PROCEDURE — 25000003 PHARM REV CODE 250: Performed by: FAMILY MEDICINE

## 2022-07-19 PROCEDURE — 25000003 PHARM REV CODE 250: Performed by: NURSE PRACTITIONER

## 2022-07-19 PROCEDURE — 85025 COMPLETE CBC W/AUTO DIFF WBC: CPT | Performed by: NURSE PRACTITIONER

## 2022-07-19 PROCEDURE — A4216 STERILE WATER/SALINE, 10 ML: HCPCS | Performed by: NURSE PRACTITIONER

## 2022-07-19 PROCEDURE — 63600175 PHARM REV CODE 636 W HCPCS: Performed by: NURSE PRACTITIONER

## 2022-07-19 PROCEDURE — 36415 COLL VENOUS BLD VENIPUNCTURE: CPT | Performed by: NURSE PRACTITIONER

## 2022-07-19 PROCEDURE — C9113 INJ PANTOPRAZOLE SODIUM, VIA: HCPCS | Performed by: NURSE PRACTITIONER

## 2022-07-19 RX ADMIN — NIFEDIPINE 60 MG: 30 TABLET, FILM COATED, EXTENDED RELEASE ORAL at 08:07

## 2022-07-19 RX ADMIN — MUPIROCIN: 20 OINTMENT TOPICAL at 08:07

## 2022-07-19 RX ADMIN — Medication 10 ML: at 05:07

## 2022-07-19 RX ADMIN — LISINOPRIL 10 MG: 10 TABLET ORAL at 08:07

## 2022-07-19 RX ADMIN — PANTOPRAZOLE SODIUM 40 MG: 40 INJECTION, POWDER, FOR SOLUTION INTRAVENOUS at 08:07

## 2022-07-19 NOTE — PLAN OF CARE
"Red - Telemetry      HOME HEALTH ORDERS  FACE TO FACE ENCOUNTER    Patient Name: Sherin Ortiz  YOB: 1945    PCP: Deedee Calderon MD   PCP Address: [address] [place] [address]  PCP Phone Number: [phone]  PCP Fax: [phone]    Encounter Date: 7/12/22    Admit to Home Health    Diagnoses:  Active Hospital Problems    Diagnosis  POA    *GIB (gastrointestinal bleeding) [K92.2]  Yes    Obesity, morbid (more than 100 lbs over ideal weight or BMI > 40) [E66.01]  Yes    (HFpEF) heart failure with preserved ejection fraction [I50.30]  Yes    PAOD (peripheral arterial occlusive disease) [I77.9]  Yes     Location in Record and Date:   [facility] DISHA-9/18/2015    "Rt DISHA (1.12) Segment/Brachial Index and PVR wavef  orms indicate minimal peripheral arterial obstructive disease.  Lt DISHA (1.09) Segment/Brachial Index and PVR waveforms indicate minimal peripheral arterial obstructive disease."  Other Chronic Conditions:  HLD, DM    Medications:  Aspirin 81 mg, Lipi  tor 40 mg      Wheelchair dependent [Z99.3]  Not Applicable     Chronic    Type 2 diabetes mellitus with diabetic polyneuropathy, with long-term current use of insulin [E11.42, Z79.4]  Not Applicable     Chronic    Anemia of chronic disease [D63.8]  Yes     Chronic    Stage 4 chronic kidney disease [N18.4]  Yes    Essential hypertension [I10]  Yes     Chronic    Hyperlipidemia LDL goal <100 [E78.5]  Yes     Chronic      Resolved Hospital Problems   No resolved problems to display.       Follow Up Appointments:  Future Appointments   Date Time Provider Department Center   7/25/2022  1:00 PM Adrienne Chisholm MD [facility] IMPRI [facility]   8/19/2022  8:05 AM LAB, [facility] LAB [place]   8/19/2022  8:30 AM SPECIMEN, JULIANE TRUONG SPECLAB [place]   8/23/2022  1:30 PM Katie Knott MD [facility] MA[facility]IC[facility]   10/5/2022 10:30 AM Deedee Calderon MD [facility] PCW   10/11/2022  1:15 PM Marlin GRAY" "JANNA Adams [facility] POD [address]       Allergies:  Review of patient's allergies indicates:   Allergen Reactions    Penicillins Hives     Other reaction(s): Hives  Patient has received cefdinir, ceftriaxone, cefazolin and cefepime in the past with no documented reactions    Sulfa (sulfonamide antibiotics) Other (See Comments)     Shakes, pt states her doctor told her the shakes were possibly caused by an allergy to sulfa       Medications: Review discharge medications with patient and family and provide education.    Current Discharge Medication List      START taking these medications    Details   pantoprazole (PROTONIX) 40 MG tablet Take 1 tablet (40 mg total) by mouth once daily.  Qty: 30 tablet, Refills: 11         CONTINUE these medications which have CHANGED    Details   aspirin 81 MG Chew Chew 1 tablet (81 mg total) by mouth once daily.  Qty: 30 tablet, Refills: 5         CONTINUE these medications which have NOT CHANGED    Details   atorvastatin (LIPITOR) 40 MG tablet Take 1 tablet (40 mg total) by mouth every evening.  Qty: 90 tablet, Refills: 3    Associated Diagnoses: Hyperlipidemia LDL goal <100      ergocalciferol (ERGOCALCIFEROL) 50,000 unit Cap Take 1 capsule (50,000 Units total) by mouth every 7 days. Weekly - 12 weeks, then monthly  Qty: 12 capsule, Refills: 3    Associated Diagnoses: Hyperparathyroidism; Chronic kidney disease, stage 4 (severe)      insulin glargine (LANTUS U-100 INSULIN) 100 unit/mL injection Inject 10-15 Units into the skin every evening. DEPENDING ON SCALE (DISCARD EACH VIAL AFTER 28 DAYS)  Qty: 2 each, Refills: 3    Associated Diagnoses: Type 2 diabetes mellitus with diabetic polyneuropathy, with long-term current use of insulin      BD INSULIN SYRINGE ULTRA-FINE 0.5 mL 30 gauge x 1/2" Syrg USE TO INJECT EVERY NIGHT  Qty: 90 each, Refills: 3      blood glucose control, high (TRUE METRIX LEVEL 3) Soln Used to calibrate weekly  Qty: 1 each, Refills: 3      blood sugar " "diagnostic (TRUE METRIX GLUCOSE TEST STRIP) Strp Test twice daily  Qty: 200 strip, Refills: 3      clopidogreL (PLAVIX) 75 mg tablet Take 1 tablet (75 mg total) by mouth once daily.  Qty: 90 tablet, Refills: 3    Comments: NEW RX REQUEST FOR PATIENT DUE TO USING NEW MAIL ORDER PHARMACY-[facility]      insulin syr/ndl U100 half yesenia (DROPLET INSULIN SYR HALF UNIT) 0.5 mL 30 gauge x 1/2" Syrg USE TO INJECT EVERY NIGHT  Qty: 100 Syringe, Refills: 4      lancets (TRUEPLUS LANCETS) 33 gauge Misc 1 lancet by Misc.(Non-Drug; Combo Route) route 2 (two) times a day.  Qty: 200 each, Refills: 0      NIFEdipine (PROCARDIA-XL) 60 MG (OSM) 24 hr tablet Take 1 tablet (60 mg total) by mouth once daily.  Qty: 90 tablet, Refills: 3    Comments: .         STOP taking these medications       acetaminophen (TYLENOL) 500 MG tablet Comments:   Reason for Stopping:         doxycycline (VIBRAMYCIN) 100 MG Cap Comments:   Reason for Stopping:                 I have seen and examined this patient within the last 30 days. My clinical findings that support the need for the home health skilled services and home bound status are the following:no   Weakness/numbness causing balance and gait disturbance due to Weakness/Debility making it taxing to leave home.     Diet:   cardiac diet and diabetic diet 1800 calorie    Labs:  none    Referrals/ Consults   to evaluate for community resources/long-range planning.    Activities:   activity as tolerated    Nursing:   Agency to admit patient within 24 hours of hospital discharge unless specified on physician order or at patient request    SN to complete comprehensive assessment including routine vital signs. Instruct on disease process and s/s of complications to report to MD. Review/verify medication list sent home with the patient at time of discharge  and instruct patient/caregiver as needed. Frequency may be adjusted depending on start of care date.     Skilled nurse to perform up to " 3 visits PRN for symptoms related to diagnosis    Notify MD if SBP > 160 or < 90; DBP > 90 or < 50; HR > 120 or < 50; Temp > 101; O2 < 88%.    Ok to schedule additional visits based on staff availability and patient request on consecutive days within the home health episode.    When multiple disciplines ordered:    Start of Care occurs on Sunday - Wednesday schedule remaining discipline evaluations as ordered on separate consecutive days following the start of care.    Thursday SOC -schedule subsequent evaluations Friday and Monday the following week.     Friday - Saturday SOC - schedule subsequent discipline evaluations on consecutive days starting Monday of the following week.    For all post-discharge communication and subsequent orders please contact patient's primary care physician.      Wound Care Orders:   R leg with open ulcer to proximal lower leg: Apply xeroform Q week.  Change BLE compression wraps using 2 layer 30-40mm/Hg wraps Q week.      Follow up with Dr. Perry in the outpt wound clinic.      I certify that this patient is confined to her home and needs intermittent skilled nursing care.

## 2022-07-19 NOTE — PLAN OF CARE
[place] contacted Jazmyn Blanchard (Daughter) [phone] to discuss dc planning. SW expressed that pt will dc today. SW expressed that she will resume care with [facility]. [facility] will contact patient to resume care and schedule appointment time/date. Pts daughter reported that she will be here for 10:30 am to transport pt home.SW will continue to follow pt throughout her transitions of care and assist with any dc needs.     SW sent HH orders via careTeamie.     Pending wound care follow up.     Future Appointments   Date Time Provider Department Center   7/25/2022  1:00 PM Adrienne Chisholm MD [facility] IMPRI [facility]   8/19/2022  8:05 AM LABRED LAB [place]   8/19/2022  8:30 AM SPECIMEN, JULIANE TRUONG SPECLAB [place]   8/23/2022  1:30 PM Katie Knott MD [facility]   10/5/2022 10:30 AM Deedee Calderon MD [facility] IM [facility] PCW   10/11/2022  1:15 PM Marlin Adams DPM [facility] POD [facility]          07/19/22 0850   Final Note   Assessment Type Final Discharge Note   Anticipated Discharge Disposition Home-[facility]   Hospital Resources/Appts/Education Provided Appointments scheduled by Navigator/Coordinator   Post-Acute Status   Discharge Delays None known at this time

## 2022-07-19 NOTE — PROGRESS NOTES
IP Liaison - Final Visit Note    Patient: Sherin Ortiz  MRN:  [id number]  Date of Service:  7/19/2022  Completed by:  ARIANNA Reid    Reason for Visit   Patient presents with    IP Liaison Chart Review       Patient discharged from hospital before JONELLEW was able to complete follow-up visit.     Patient Summary     Discharge Date: 07/19/2022  Discharge telephone number/address: [phone] / [address] [address]  Follow up provider: Adrienne Chisholm MD  Follow up appointments: 7/25/2022 @ 1:00pm  Home Health agency & telephone number: [facility]  DME ordered &  name: n/a  Assigned OPCM RN/SW: n/a  Report sent to follow up team (PCP/OPCM) via in basket message: n/a  Community Resources arranged including agency name & contact info: n/a      ARIANNA Reid

## 2022-07-19 NOTE — PLAN OF CARE
Introduced as VN and will be reviewing discharge instructions.  Educated patient on reason for admission, home medication list, and discharge instructions including when to return to ED and the following doctor appointments.  Education per flowsheet.  Opportunity given for questions and questions answered.    Nurse notified of   completion of discharge education. Patient waiting for help to get dressed then nurse will be calling daughter to come and get patient

## 2022-07-20 ENCOUNTER — HOSPITAL ENCOUNTER (OUTPATIENT)
Facility: HOSPITAL | Age: 77
Discharge: NURSING, REHAB OR LONG-TERM CARE | End: 2022-07-22
Attending: EMERGENCY MEDICINE | Admitting: HOSPITALIST
Payer: MEDICARE

## 2022-07-20 ENCOUNTER — NURSE TRIAGE (OUTPATIENT)
Dept: ADMINISTRATIVE | Facility: CLINIC | Age: 77
End: 2022-07-20
Payer: MEDICARE

## 2022-07-20 ENCOUNTER — PATIENT OUTREACH (OUTPATIENT)
Dept: ADMINISTRATIVE | Facility: CLINIC | Age: 77
End: 2022-07-20
Payer: MEDICARE

## 2022-07-20 DIAGNOSIS — K92.1 MELENA: ICD-10-CM

## 2022-07-20 DIAGNOSIS — K92.1 GASTROINTESTINAL HEMORRHAGE WITH MELENA: ICD-10-CM

## 2022-07-20 PROBLEM — Z71.89 ACP (ADVANCE CARE PLANNING): Status: ACTIVE | Noted: 2022-07-20

## 2022-07-20 LAB
ABO + RH BLD: NORMAL
ALBUMIN SERPL BCP-MCNC: 2.3 G/DL (ref 3.5–5.2)
ALP SERPL-CCNC: 147 U/L (ref 55–135)
ALT SERPL W/O P-5'-P-CCNC: 9 U/L (ref 10–44)
ANION GAP SERPL CALC-SCNC: 8 MMOL/L (ref 8–16)
AST SERPL-CCNC: 14 U/L (ref 10–40)
BASOPHILS # BLD AUTO: 0.02 K/UL (ref 0–0.2)
BASOPHILS NFR BLD: 0.5 % (ref 0–1.9)
BILIRUB SERPL-MCNC: 0.3 MG/DL (ref 0.1–1)
BLD GP AB SCN CELLS X3 SERPL QL: NORMAL
BUN SERPL-MCNC: 35 MG/DL (ref 8–23)
CALCIUM SERPL-MCNC: 8.6 MG/DL (ref 8.7–10.5)
CHLORIDE SERPL-SCNC: 115 MMOL/L (ref 95–110)
CO2 SERPL-SCNC: 19 MMOL/L (ref 23–29)
CREAT SERPL-MCNC: 1.6 MG/DL (ref 0.5–1.4)
CTP QC/QA: YES
DIFFERENTIAL METHOD: ABNORMAL
EOSINOPHIL # BLD AUTO: 0.1 K/UL (ref 0–0.5)
EOSINOPHIL NFR BLD: 1.5 % (ref 0–8)
ERYTHROCYTE [DISTWIDTH] IN BLOOD BY AUTOMATED COUNT: 16.3 % (ref 11.5–14.5)
EST. GFR  (AFRICAN AMERICAN): 35 ML/MIN/1.73 M^2
EST. GFR  (NON AFRICAN AMERICAN): 30 ML/MIN/1.73 M^2
GLUCOSE SERPL-MCNC: 198 MG/DL (ref 70–110)
HCT VFR BLD AUTO: 25.3 % (ref 37–48.5)
HGB BLD-MCNC: 8.1 G/DL (ref 12–16)
IMM GRANULOCYTES # BLD AUTO: 0.01 K/UL (ref 0–0.04)
IMM GRANULOCYTES NFR BLD AUTO: 0.2 % (ref 0–0.5)
LACTATE SERPL-SCNC: 0.8 MMOL/L (ref 0.5–2.2)
LYMPHOCYTES # BLD AUTO: 1.1 K/UL (ref 1–4.8)
LYMPHOCYTES NFR BLD: 27 % (ref 18–48)
MCH RBC QN AUTO: 29.5 PG (ref 27–31)
MCHC RBC AUTO-ENTMCNC: 32 G/DL (ref 32–36)
MCV RBC AUTO: 92 FL (ref 82–98)
MONOCYTES # BLD AUTO: 0.4 K/UL (ref 0.3–1)
MONOCYTES NFR BLD: 8.8 % (ref 4–15)
NEUTROPHILS # BLD AUTO: 2.5 K/UL (ref 1.8–7.7)
NEUTROPHILS NFR BLD: 62 % (ref 38–73)
NRBC BLD-RTO: 0 /100 WBC
OB PNL STL: POSITIVE
PLATELET # BLD AUTO: 190 K/UL (ref 150–450)
PMV BLD AUTO: 9.7 FL (ref 9.2–12.9)
POTASSIUM SERPL-SCNC: 4.5 MMOL/L (ref 3.5–5.1)
PROT SERPL-MCNC: 5.8 G/DL (ref 6–8.4)
RBC # BLD AUTO: 2.75 M/UL (ref 4–5.4)
SARS-COV-2 RDRP RESP QL NAA+PROBE: NEGATIVE
SODIUM SERPL-SCNC: 142 MMOL/L (ref 136–145)
WBC # BLD AUTO: 4.08 K/UL (ref 3.9–12.7)

## 2022-07-20 PROCEDURE — 82272 OCCULT BLD FECES 1-3 TESTS: CPT | Performed by: EMERGENCY MEDICINE

## 2022-07-20 PROCEDURE — 99291 CRITICAL CARE FIRST HOUR: CPT | Mod: 25

## 2022-07-20 PROCEDURE — 80053 COMPREHEN METABOLIC PANEL: CPT | Performed by: EMERGENCY MEDICINE

## 2022-07-20 PROCEDURE — 63600175 PHARM REV CODE 636 W HCPCS: Performed by: INTERNAL MEDICINE

## 2022-07-20 PROCEDURE — 82962 GLUCOSE BLOOD TEST: CPT

## 2022-07-20 PROCEDURE — 96372 THER/PROPH/DIAG INJ SC/IM: CPT | Mod: 59 | Performed by: INTERNAL MEDICINE

## 2022-07-20 PROCEDURE — G0378 HOSPITAL OBSERVATION PER HR: HCPCS

## 2022-07-20 PROCEDURE — C9399 UNCLASSIFIED DRUGS OR BIOLOG: HCPCS | Performed by: INTERNAL MEDICINE

## 2022-07-20 PROCEDURE — 96361 HYDRATE IV INFUSION ADD-ON: CPT

## 2022-07-20 PROCEDURE — 25000003 PHARM REV CODE 250: Performed by: INTERNAL MEDICINE

## 2022-07-20 PROCEDURE — C9113 INJ PANTOPRAZOLE SODIUM, VIA: HCPCS | Performed by: INTERNAL MEDICINE

## 2022-07-20 PROCEDURE — 86901 BLOOD TYPING SEROLOGIC RH(D): CPT | Performed by: EMERGENCY MEDICINE

## 2022-07-20 PROCEDURE — U0002 COVID-19 LAB TEST NON-CDC: HCPCS | Performed by: EMERGENCY MEDICINE

## 2022-07-20 PROCEDURE — 96374 THER/PROPH/DIAG INJ IV PUSH: CPT

## 2022-07-20 PROCEDURE — 83605 ASSAY OF LACTIC ACID: CPT | Performed by: HOSPITALIST

## 2022-07-20 PROCEDURE — 85025 COMPLETE CBC W/AUTO DIFF WBC: CPT | Performed by: EMERGENCY MEDICINE

## 2022-07-20 RX ORDER — INSULIN ASPART 100 [IU]/ML
1-10 INJECTION, SOLUTION INTRAVENOUS; SUBCUTANEOUS
Status: DISCONTINUED | OUTPATIENT
Start: 2022-07-20 | End: 2022-07-22 | Stop reason: HOSPADM

## 2022-07-20 RX ORDER — ASCORBIC ACID 500 MG
500 TABLET ORAL DAILY
COMMUNITY
End: 2023-03-23

## 2022-07-20 RX ORDER — ACETAMINOPHEN 325 MG/1
650 TABLET ORAL EVERY 6 HOURS PRN
Status: DISCONTINUED | OUTPATIENT
Start: 2022-07-20 | End: 2022-07-22 | Stop reason: HOSPADM

## 2022-07-20 RX ORDER — PROCHLORPERAZINE EDISYLATE 5 MG/ML
5 INJECTION INTRAMUSCULAR; INTRAVENOUS EVERY 4 HOURS PRN
Status: DISCONTINUED | OUTPATIENT
Start: 2022-07-20 | End: 2022-07-22 | Stop reason: HOSPADM

## 2022-07-20 RX ORDER — GLUCAGON 1 MG
1 KIT INJECTION
Status: DISCONTINUED | OUTPATIENT
Start: 2022-07-20 | End: 2022-07-22 | Stop reason: HOSPADM

## 2022-07-20 RX ORDER — ONDANSETRON 2 MG/ML
4 INJECTION INTRAMUSCULAR; INTRAVENOUS EVERY 6 HOURS PRN
Status: DISCONTINUED | OUTPATIENT
Start: 2022-07-20 | End: 2022-07-22 | Stop reason: HOSPADM

## 2022-07-20 RX ORDER — ATORVASTATIN CALCIUM 40 MG/1
40 TABLET, FILM COATED ORAL NIGHTLY
Status: DISCONTINUED | OUTPATIENT
Start: 2022-07-20 | End: 2022-07-22 | Stop reason: HOSPADM

## 2022-07-20 RX ORDER — IBUPROFEN 200 MG
16 TABLET ORAL
Status: DISCONTINUED | OUTPATIENT
Start: 2022-07-20 | End: 2022-07-22 | Stop reason: HOSPADM

## 2022-07-20 RX ORDER — SODIUM CHLORIDE, SODIUM LACTATE, POTASSIUM CHLORIDE, CALCIUM CHLORIDE 600; 310; 30; 20 MG/100ML; MG/100ML; MG/100ML; MG/100ML
INJECTION, SOLUTION INTRAVENOUS CONTINUOUS
Status: DISCONTINUED | OUTPATIENT
Start: 2022-07-20 | End: 2022-07-21

## 2022-07-20 RX ORDER — PANTOPRAZOLE SODIUM 40 MG/10ML
40 INJECTION, POWDER, LYOPHILIZED, FOR SOLUTION INTRAVENOUS 2 TIMES DAILY
Status: DISCONTINUED | OUTPATIENT
Start: 2022-07-20 | End: 2022-07-21

## 2022-07-20 RX ORDER — IBUPROFEN 200 MG
24 TABLET ORAL
Status: DISCONTINUED | OUTPATIENT
Start: 2022-07-20 | End: 2022-07-22 | Stop reason: HOSPADM

## 2022-07-20 RX ADMIN — PANTOPRAZOLE SODIUM 40 MG: 40 INJECTION, POWDER, FOR SOLUTION INTRAVENOUS at 07:07

## 2022-07-20 RX ADMIN — INSULIN DETEMIR 10 UNITS: 100 INJECTION, SOLUTION SUBCUTANEOUS at 08:07

## 2022-07-20 RX ADMIN — SODIUM CHLORIDE, SODIUM LACTATE, POTASSIUM CHLORIDE, AND CALCIUM CHLORIDE: .6; .31; .03; .02 INJECTION, SOLUTION INTRAVENOUS at 09:07

## 2022-07-20 RX ADMIN — ATORVASTATIN CALCIUM 40 MG: 40 TABLET, FILM COATED ORAL at 08:07

## 2022-07-20 NOTE — ED TRIAGE NOTES
"Recent hx of GI bleed. Pt says "they did a scope and caurterized a vein that was bleeding. They told me to come back if my black stools". Pt denies abd pain, diarrhea  "

## 2022-07-20 NOTE — TELEPHONE ENCOUNTER
"Patient was contacted for TCC post discharge call.  Patient complains of difficulty transferring due to weakness in legs. Patient states that she did not ambulate in hospital at all. Denies falling. BG reading is 136 @ 1102.  Daughter states that patient is "always like this for 5-6 days after being discharged from the hospital," and "they usually send her to rehab at discharge" from the hospital. Triage disposition offered to go to ED now or Office.  Daughter declined need for triage at this point.  Message sent - not routed - to PCP staff about the issue.  [facility] contacted - to see patient tomorrow.     Reason for Disposition   MODERATE weakness (i.e., interferes with work, school, normal activities) and cause unknown (Exceptions: weakness with acute minor illness, or weakness from poor fluid intake)    Additional Information   Negative: SEVERE difficulty breathing (e.g., struggling for each breath, speaks in single words)   Negative: Difficult to awaken or acting confused (e.g., disoriented, slurred speech)   Negative: Fainted > 15 minutes ago and still feels too weak or dizzy to stand   Negative: Weakness of the face, arm or leg on one side of the body   Negative: Difficulty breathing   Negative: Has diabetes mellitus and weakness from low blood sugar (i.e., < 60 mg/dL or 3.5 mmol/L)   Negative: Vomiting is main symptom   Negative: Diarrhea is main symptom   Negative: Heart beating < 50 beats per minute OR > 140 beats per minute   Negative: Extra heartbeats OR irregular heart beating (i.e., 'palpitations')   Negative: Follows bleeding (e.g., from vomiting, rectum, vagina) (Exception: small transient weakness from sight of a small amount blood)   Negative: Bloody, black, or tarry bowel movements (Exception: chronic-unchanged black-grey bowel movements and is taking iron pills or Pepto-Bismol)   Negative: MODERATE weakness from poor fluid intake with no improvement after 2 hours of rest and fluids   " Negative: Drinking very little and dehydration suspected (e.g., no urine > 12 hours, very dry mouth, very lightheaded)   Negative: Patient sounds very sick or weak to the triager   Negative: Shock suspected (e.g., cold/pale/clammy skin, too weak to stand, low BP, rapid pulse)   Negative: SEVERE weakness (i.e., unable to walk or barely able to walk, requires support) and new-onset or worsening   Negative: Sounds like a life-threatening emergency to the triager   Negative: Recent heat exposure, suspected cause of weakness    Protocols used: WEAKNESS (GENERALIZED) AND FATIGUE-A-OH

## 2022-07-20 NOTE — PROGRESS NOTES
C3 nurse spoke with Sherin Ortiz and her daughter, Hayley, for a TCC post hospital discharge follow up call. The patient has a scheduled HOSFU appointment with Adrienne Chisholm MD on 7/25/2022 @ 1300.

## 2022-07-20 NOTE — ED PROVIDER NOTES
Encounter Date: 7/20/2022    SCRIBE #1 NOTE: I, Jules Linares, am scribing for, and in the presence of,  Yoel Freeman MD. I have scribed the following portions of the note - Other sections scribed: HPI, ROS, PE.       History     Chief Complaint   Patient presents with    Rectal Bleeding     Dark stools did not improve after surgery Monday. Denies abd pain.     Hypotension     Low BP when she stands.      Sherin Ortiz is a 77 y.o. 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), Benign essential hypertension (11/14/2012), Cataract, Chronic kidney disease (CKD), stage III (moderate) (9/12/2013), Diabetic peripheral neuropathy associated with type 2 diabetes mellitus (11/14/2014), Gait disorder, Hyperlipidemia, Iritis - Both Eyes (6/10/2013), Kidney stone, Lymphedema, Morbid obesity with BMI of 40.0-44.9, adult (2/18/2015), Nephrolithiasis (4/20/2016), NS (nuclear sclerosis) (4/1/2013), Nuclear sclerosis - Both Eyes (4/29/2013), Preseptal cellulitis - Right Eye (4/29/2013), Proliferative diabetic retinopathy - Both Eyes (4/29/2013), Proliferative diabetic retinopathy, both eyes (4/1/2013), PSC (posterior subcapsular cataract) - Both Eyes (4/29/2013), S/P hernia repair (12/19/2012), TIA (transient ischemic attack) (11/18/2014), Tinea pedis (7/24/2012), Type 2 diabetes mellitus with diabetic polyneuropathy, with long-term current use of insulin (9/18/2015), Type 2 diabetes mellitus with renal manifestations, controlled (12/12/2013), Type 2 diabetes, controlled, with moderate nonproliferative diabetic retinopathy without macular edema (9/17/2015), Ulcer of left lower extremity, limited to breakdown of skin (7/8/2015), Unspecified cerebral artery occlusion with cerebral infarction (11/16/2014), Unspecified venous (peripheral) insufficiency, Ureteral stone with hydronephrosis (1/27/2016), UTI (lower urinary tract infection), Vaginal infection, and 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      She presents today due to continued bloody stool.  Patient was recently discharged after being treated for a Dieulafoy lesion.  She states she has had continued bleeding since the procedure and discharge and was advised to return.  Patient reports no symptoms of shortness of breath abdominal pain but she does feel dizzy fatigued and weak.  No other concerns noted today.    The history is provided by the patient. No  was used.     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    Benign essential hypertension 11/14/2012    Cataract     s/p phacoemulsification    Chronic kidney disease (CKD), stage III (moderate) 9/12/2013    Diabetic peripheral neuropathy associated with type 2 diabetes mellitus 11/14/2014    causing right hemiparesis    Gait disorder     Hyperlipidemia     Iritis - Both Eyes 6/10/2013    Kidney stone     Lymphedema     Morbid obesity with BMI of 40.0-44.9, adult 2/18/2015    Nephrolithiasis 4/20/2016    NS (nuclear sclerosis) 4/1/2013    Nuclear sclerosis - Both Eyes 4/29/2013    Preseptal cellulitis - Right Eye 4/29/2013    Proliferative diabetic retinopathy - Both Eyes 4/29/2013    Proliferative diabetic retinopathy, both eyes 4/1/2013    PSC (posterior subcapsular cataract) - Both Eyes 4/29/2013    S/P hernia repair 12/19/2012    TIA (transient ischemic attack) 11/18/2014    Tinea pedis 7/24/2012    Tinea pedis is present on both feet.     Type 2 diabetes mellitus with diabetic polyneuropathy, with long-term  current use of insulin 9/18/2015    Type 2 diabetes mellitus with renal manifestations, controlled 12/12/2013    Type 2 diabetes, controlled, with moderate nonproliferative diabetic retinopathy without macular edema 9/17/2015    Ulcer of left lower extremity, limited to breakdown of skin 7/8/2015    Unspecified cerebral artery occlusion with cerebral infarction 11/16/2014    Unspecified venous (peripheral) insufficiency     Ureteral stone with hydronephrosis 1/27/2016    UTI (lower urinary tract infection)     Vaginal infection     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     Past Surgical History:   Procedure Laterality Date    ABLATION Bilateral 6/23/2022    Procedure: Ablation;  Surgeon: Vahid Farfan MD;  Location: [facility] CATH LAB/EP;  Service: Cardiology;  Laterality: Bilateral;    APPENDECTOMY      CATARACT EXTRACTION W/  INTRAOCULAR LENS IMPLANT Left 5/21/2013    CATARACT EXTRACTION W/  INTRAOCULAR LENS IMPLANT Right 6/4/2013    CHOLECYSTECTOMY      COLONOSCOPY  12/22/2005    normal    COLONOSCOPY N/A 7/14/2022    Procedure: COLONOSCOPY;  Surgeon: Kannan Mace MD;  Location: [facility];  Service: Endoscopy;  Laterality: N/A;    ESOPHAGOGASTRODUODENOSCOPY  12/21/2015    hiatal hernia, Schatzki ring    ESOPHAGOGASTRODUODENOSCOPY N/A 7/13/2022    Procedure: EGD (ESOPHAGOGASTRODUODENOSCOPY);  Surgeon: Kannan Mace MD;  Location: [facility];  Service: Endoscopy;  Laterality: N/A;    EYE SURGERY Bilateral 2008    laser surgery both eyes    INTRALUMINAL GASTROINTESTINAL TRACT IMAGING VIA CAPSULE N/A 7/15/2022    Procedure: IMAGING PROCEDURE, GI TRACT, INTRALUMINAL, VIA CAPSULE;  Surgeon: Kannan Mace MD;  Location: [facility];  Service: Endoscopy;  Laterality: N/A;    NASAL SEPTUM SURGERY      SMALL BOWEL ENTEROSCOPY N/A 7/18/2022    Procedure: ENTEROSCOPY Upper SBE;  Surgeon: Salo Frank MD;  Location: [facility];  Service: Endoscopy;  Laterality: N/A;    SUBTOTAL COLECTOMY   2012    transverse colon, for incarcerated umbilical hernia, Dr. Kat Bower     Family History   Problem Relation Age of Onset    Diabetes Sister     Cataracts Sister     Heart disease Brother     Cataracts Brother     Leukemia Mother     Cancer Neg Hx     Amblyopia Neg Hx     Blindness Neg Hx     Glaucoma Neg Hx     Hypertension Neg Hx     Macular degeneration Neg Hx     Retinal detachment Neg Hx     Strabismus Neg Hx     Stroke Neg Hx     Thyroid disease Neg Hx     Kidney disease Neg Hx      Social History     Tobacco Use    Smoking status: Former Smoker     Packs/day: 0.50     Years: 15.00     Pack years: 7.50     Types: Cigarettes     Quit date: 1982     Years since quittin.0    Smokeless tobacco: Former User    Tobacco comment: smoked one pack per week   Substance Use Topics    Alcohol use: No    Drug use: No     Review of Systems   Constitutional: Negative for chills and fever.   HENT: Negative for sore throat.    Respiratory: Negative for cough and shortness of breath.    Cardiovascular: Negative for chest pain.   Gastrointestinal: Positive for blood in stool. Negative for nausea and vomiting.   Genitourinary: Negative for dysuria, frequency and urgency.   Musculoskeletal: Negative for back pain, neck pain and neck stiffness.   Skin: Negative for rash and wound.   Neurological: Positive for dizziness and weakness. Negative for syncope.   Hematological: Does not bruise/bleed easily.   Psychiatric/Behavioral: Negative for agitation, behavioral problems and confusion.       Physical Exam     Initial Vitals [22 1640]   BP Pulse Resp Temp SpO2   (!) 142/80 70 (!) 22 98.3 °F (36.8 °C) 100 %      MAP       --         Physical Exam    Nursing note and vitals reviewed.  Constitutional: She appears well-developed and well-nourished. She is not diaphoretic. No distress.   HENT:   Head: Normocephalic and atraumatic.   Mouth/Throat: Oropharynx is clear and moist.   Eyes:  EOM are normal. Pupils are equal, round, and reactive to light.   Neck: No tracheal deviation present.   Cardiovascular: Normal rate, regular rhythm, normal heart sounds and intact distal pulses.   Pulmonary/Chest: Breath sounds normal. No stridor. No respiratory distress. She has no wheezes.   Abdominal: Abdomen is soft. Bowel sounds are normal. She exhibits no distension and no mass. There is no abdominal tenderness.   Genitourinary:    Genitourinary Comments: Rectal exam with saira RN Holiday:  Melena noted     Musculoskeletal:         General: No edema. Normal range of motion.     Neurological: She is alert and oriented to person, place, and time. No cranial nerve deficit or sensory deficit.   Skin: Skin is warm and dry. Capillary refill takes less than 2 seconds. No rash noted.   Psychiatric: She has a normal mood and affect.         ED Course   Procedures  Labs Reviewed   CBC W/ AUTO DIFFERENTIAL - Abnormal; Notable for the following components:       Result Value    RBC 2.75 (*)     Hemoglobin 8.1 (*)     Hematocrit 25.3 (*)     RDW 16.3 (*)     All other components within normal limits   COMPREHENSIVE METABOLIC PANEL - Abnormal; Notable for the following components:    Chloride 115 (*)     CO2 19 (*)     Glucose 198 (*)     BUN 35 (*)     Creatinine 1.6 (*)     Calcium 8.6 (*)     Total Protein 5.8 (*)     Albumin 2.3 (*)     Alkaline Phosphatase 147 (*)     ALT 9 (*)     eGFR if  35 (*)     eGFR if non  30 (*)     All other components within normal limits   OCCULT BLOOD X 1, STOOL - Abnormal; Notable for the following components:    Occult Blood Positive (*)     All other components within normal limits   LACTIC ACID, PLASMA   LACTIC ACID, PLASMA   HEMATOCRIT   HEMOGLOBIN   SARS-COV-2 RDRP GENE   TYPE & SCREEN   POCT GLUCOSE MONITORING CONTINUOUS          Imaging Results    None          Medications   atorvastatin tablet 40 mg (40 mg Oral Given 7/20/22 2008)   insulin  detemir U-100 pen 10 Units (10 Units Subcutaneous Given 7/20/22 2009)   lactated ringers infusion ( Intravenous New Bag 7/20/22 2130)   pantoprazole injection 40 mg (40 mg Intravenous Given 7/20/22 1954)   glucose chewable tablet 16 g (has no administration in time range)   glucose chewable tablet 24 g (has no administration in time range)   glucagon (human recombinant) injection 1 mg (has no administration in time range)   insulin aspart U-100 pen 1-10 Units (has no administration in time range)   acetaminophen tablet 650 mg (has no administration in time range)   ondansetron injection 4 mg (has no administration in time range)   prochlorperazine injection Soln 5 mg (has no administration in time range)   dextrose 10% bolus 125 mL (has no administration in time range)   dextrose 10% bolus 250 mL (has no administration in time range)     Medical Decision Making:   History:   Old Medical Records: I decided to obtain old medical records.  Initial Assessment:   76 yo female, with recurrent melena. VSS. Abdomen benign.   Differential Diagnosis:   Differential Diagnosis includes, but is not limited to:  Lower GI bleed (AVM, colitis, colon cancer, polyp, internal/external hemorrhoid, IBS, rectal injury/foreign body, chronic diarrhea, anal fissure), upper GI bleed (PUD, perforated ulcer, esophageal variceal bleed), Crohns disease, ulcerative colitis, chronic diarrhea, dietary intake    ED Management:  Patient's labs are consistent with previous results.  Hemoglobin and hematocrit are stable.  I discussed the case with [facility] who will place patient on their service for GI evaluation further observation for melena.  Patient was informed of this plan and verbalizes understanding.  Other:   I have discussed this case with another health care provider.       <> Summary of the Discussion: Discussed with [facility] GI fellow, states Dr. Frank is on tomorrow, will likely repeat enteroscopy.          Scribe  Attestation:   Scribe #1: I performed the above scribed service and the documentation accurately describes the services I performed. I attest to the accuracy of the note.                 Clinical Impression:   Final diagnoses:  [K92.1] [facility]          ED Disposition Condition    Observation               Portions of this note were dictated using voice recognition software and may contain dictation related errors in spelling/grammar/syntax not found on text review         Yoel Freeman Jr., MD  07/20/22 0540

## 2022-07-21 LAB
ANION GAP SERPL CALC-SCNC: 7 MMOL/L (ref 8–16)
BASOPHILS # BLD AUTO: 0.01 K/UL (ref 0–0.2)
BASOPHILS NFR BLD: 0.3 % (ref 0–1.9)
BUN SERPL-MCNC: 31 MG/DL (ref 8–23)
CALCIUM SERPL-MCNC: 8.3 MG/DL (ref 8.7–10.5)
CHLORIDE SERPL-SCNC: 117 MMOL/L (ref 95–110)
CO2 SERPL-SCNC: 20 MMOL/L (ref 23–29)
CREAT SERPL-MCNC: 1.4 MG/DL (ref 0.5–1.4)
DIFFERENTIAL METHOD: ABNORMAL
EOSINOPHIL # BLD AUTO: 0.1 K/UL (ref 0–0.5)
EOSINOPHIL NFR BLD: 2.8 % (ref 0–8)
ERYTHROCYTE [DISTWIDTH] IN BLOOD BY AUTOMATED COUNT: 15.7 % (ref 11.5–14.5)
EST. GFR  (AFRICAN AMERICAN): 41 ML/MIN/1.73 M^2
EST. GFR  (NON AFRICAN AMERICAN): 36 ML/MIN/1.73 M^2
GLUCOSE SERPL-MCNC: 86 MG/DL (ref 70–110)
HCT VFR BLD AUTO: 23.1 % (ref 37–48.5)
HCT VFR BLD AUTO: 23.1 % (ref 37–48.5)
HCT VFR BLD AUTO: 23.5 % (ref 37–48.5)
HCT VFR BLD AUTO: 24.5 % (ref 37–48.5)
HCT VFR BLD AUTO: 25.2 % (ref 37–48.5)
HGB BLD-MCNC: 7.2 G/DL (ref 12–16)
HGB BLD-MCNC: 7.2 G/DL (ref 12–16)
HGB BLD-MCNC: 7.4 G/DL (ref 12–16)
HGB BLD-MCNC: 7.7 G/DL (ref 12–16)
HGB BLD-MCNC: 8 G/DL (ref 12–16)
IMM GRANULOCYTES # BLD AUTO: 0.01 K/UL (ref 0–0.04)
IMM GRANULOCYTES NFR BLD AUTO: 0.3 % (ref 0–0.5)
LYMPHOCYTES # BLD AUTO: 1.2 K/UL (ref 1–4.8)
LYMPHOCYTES NFR BLD: 37.3 % (ref 18–48)
MCH RBC QN AUTO: 29 PG (ref 27–31)
MCHC RBC AUTO-ENTMCNC: 31.2 G/DL (ref 32–36)
MCV RBC AUTO: 93 FL (ref 82–98)
MONOCYTES # BLD AUTO: 0.3 K/UL (ref 0.3–1)
MONOCYTES NFR BLD: 10.1 % (ref 4–15)
NEUTROPHILS # BLD AUTO: 1.6 K/UL (ref 1.8–7.7)
NEUTROPHILS NFR BLD: 49.2 % (ref 38–73)
NRBC BLD-RTO: 0 /100 WBC
PLATELET # BLD AUTO: 168 K/UL (ref 150–450)
PMV BLD AUTO: 9.6 FL (ref 9.2–12.9)
POCT GLUCOSE: 107 MG/DL (ref 70–110)
POCT GLUCOSE: 129 MG/DL (ref 70–110)
POCT GLUCOSE: 150 MG/DL (ref 70–110)
POCT GLUCOSE: 198 MG/DL (ref 70–110)
POCT GLUCOSE: 59 MG/DL (ref 70–110)
POCT GLUCOSE: 59 MG/DL (ref 70–110)
POCT GLUCOSE: 62 MG/DL (ref 70–110)
POCT GLUCOSE: 96 MG/DL (ref 70–110)
POTASSIUM SERPL-SCNC: 3.9 MMOL/L (ref 3.5–5.1)
RBC # BLD AUTO: 2.48 M/UL (ref 4–5.4)
SODIUM SERPL-SCNC: 144 MMOL/L (ref 136–145)
WBC # BLD AUTO: 3.16 K/UL (ref 3.9–12.7)

## 2022-07-21 PROCEDURE — 96361 HYDRATE IV INFUSION ADD-ON: CPT

## 2022-07-21 PROCEDURE — 25000003 PHARM REV CODE 250: Performed by: HOSPITALIST

## 2022-07-21 PROCEDURE — 85018 HEMOGLOBIN: CPT | Mod: 91 | Performed by: INTERNAL MEDICINE

## 2022-07-21 PROCEDURE — 85018 HEMOGLOBIN: CPT | Performed by: INTERNAL MEDICINE

## 2022-07-21 PROCEDURE — 96376 TX/PRO/DX INJ SAME DRUG ADON: CPT

## 2022-07-21 PROCEDURE — 94761 N-INVAS EAR/PLS OXIMETRY MLT: CPT

## 2022-07-21 PROCEDURE — 85025 COMPLETE CBC W/AUTO DIFF WBC: CPT | Performed by: INTERNAL MEDICINE

## 2022-07-21 PROCEDURE — 63600175 PHARM REV CODE 636 W HCPCS: Performed by: INTERNAL MEDICINE

## 2022-07-21 PROCEDURE — 96375 TX/PRO/DX INJ NEW DRUG ADDON: CPT

## 2022-07-21 PROCEDURE — G0378 HOSPITAL OBSERVATION PER HR: HCPCS

## 2022-07-21 PROCEDURE — 97530 THERAPEUTIC ACTIVITIES: CPT

## 2022-07-21 PROCEDURE — 25000003 PHARM REV CODE 250: Performed by: INTERNAL MEDICINE

## 2022-07-21 PROCEDURE — 30200315 PPD INTRADERMAL TEST REV CODE 302: Performed by: HOSPITALIST

## 2022-07-21 PROCEDURE — 85014 HEMATOCRIT: CPT | Mod: 91 | Performed by: INTERNAL MEDICINE

## 2022-07-21 PROCEDURE — C9113 INJ PANTOPRAZOLE SODIUM, VIA: HCPCS | Performed by: INTERNAL MEDICINE

## 2022-07-21 PROCEDURE — 36415 COLL VENOUS BLD VENIPUNCTURE: CPT | Performed by: INTERNAL MEDICINE

## 2022-07-21 PROCEDURE — 85014 HEMATOCRIT: CPT | Performed by: INTERNAL MEDICINE

## 2022-07-21 PROCEDURE — 97165 OT EVAL LOW COMPLEX 30 MIN: CPT

## 2022-07-21 PROCEDURE — 80048 BASIC METABOLIC PNL TOTAL CA: CPT | Performed by: INTERNAL MEDICINE

## 2022-07-21 PROCEDURE — 97161 PT EVAL LOW COMPLEX 20 MIN: CPT

## 2022-07-21 PROCEDURE — 86580 TB INTRADERMAL TEST: CPT | Performed by: HOSPITALIST

## 2022-07-21 RX ORDER — LISINOPRIL 5 MG/1
5 TABLET ORAL DAILY
Status: DISCONTINUED | OUTPATIENT
Start: 2022-07-22 | End: 2022-07-21

## 2022-07-21 RX ORDER — PANTOPRAZOLE SODIUM 40 MG/1
40 TABLET, DELAYED RELEASE ORAL DAILY
Status: DISCONTINUED | OUTPATIENT
Start: 2022-07-21 | End: 2022-07-22 | Stop reason: HOSPADM

## 2022-07-21 RX ORDER — LISINOPRIL 5 MG/1
5 TABLET ORAL DAILY
Status: DISCONTINUED | OUTPATIENT
Start: 2022-07-21 | End: 2022-07-22 | Stop reason: HOSPADM

## 2022-07-21 RX ADMIN — DEXTROSE 125 ML: 10 SOLUTION INTRAVENOUS at 05:07

## 2022-07-21 RX ADMIN — ATORVASTATIN CALCIUM 40 MG: 40 TABLET, FILM COATED ORAL at 08:07

## 2022-07-21 RX ADMIN — PANTOPRAZOLE SODIUM 40 MG: 40 INJECTION, POWDER, FOR SOLUTION INTRAVENOUS at 08:07

## 2022-07-21 RX ADMIN — INSULIN DETEMIR 10 UNITS: 100 INJECTION, SOLUTION SUBCUTANEOUS at 08:07

## 2022-07-21 RX ADMIN — TUBERCULIN PURIFIED PROTEIN DERIVATIVE 5 UNITS: 5 INJECTION, SOLUTION INTRADERMAL at 02:07

## 2022-07-21 RX ADMIN — Medication 16 G: at 11:07

## 2022-07-21 RX ADMIN — LISINOPRIL 5 MG: 5 TABLET ORAL at 05:07

## 2022-07-21 RX ADMIN — SODIUM CHLORIDE, SODIUM LACTATE, POTASSIUM CHLORIDE, AND CALCIUM CHLORIDE: .6; .31; .03; .02 INJECTION, SOLUTION INTRAVENOUS at 09:07

## 2022-07-21 NOTE — PROGRESS NOTES
07/21/22 0305   Admission   Initial VN Admission Questions Complete   Shift   Virtual Nurse - Patient Verbalized Approval Of Camera Use;VN Rounding   Safety/Activity   Patient Rounds bed in low position;call light in patient/parent reach;clutter free environment maintained;visualized patient;placement of personal items at bedside   Safety Promotion/Fall Prevention assistive device/personal item within reach;Fall Risk reviewed with patient/family;side rails raised x 2   Positioning   Body Position supine   Head of Bed (HOB) Positioning HOB at 30-45 degrees   Pain/Comfort/Sleep   Comfort/Acceptable Pain Level 5   Pain Rating (0-10): Rest 0   VN cued in to pt's room with permission. Admission questions completed. Plan of care reviewed with pt. Pt denies any questions or concerns at this time. Call bell w/in reach. Instructed to call for needs/assist.

## 2022-07-21 NOTE — HPI
77F recent admission for GIB. She underwent EGD which revealed actively bleeding Dieulafoy lesion in the proximal jejunum - ablated with APC, hemoclips then marked with Celia Ink. She was DC to home unfortunately she had increasing weakness and said she continued to have melanotic stools. Patient complains of difficulty transferring due to weakness in legs. Patient states that she did not ambulate in hospital at all. She was occult positive in the ER and the ER MD talked to GI who plans on repeat endoscopy in AM. JUAN PABLOM consulted for admission. LUIS ARMANDO ER MD.

## 2022-07-21 NOTE — PLAN OF CARE
RUPERT cee performed, report to follow    Pt will benefit from SNF at discharge    Problem: Occupational Therapy  Goal: Occupational Therapy Goal  Description: Goals to be met by: 8/21     Patient will increase functional independence with ADLs by performing:    UE Dressing with Stand-by Assistance.  LE Dressing with Stand-by Assistance.  Grooming while seated with Stand-by Assistance.  Toileting from bedside commode with Stand-by Assistance for hygiene and clothing management.   Toilet transfer to bedside commode with Stand-by Assistance.  Upper extremity exercise program x10 reps per handout, with independence.    Outcome: Ongoing, Progressing

## 2022-07-21 NOTE — PHARMACY MED REC
"Admission Medication History     The home medication history was taken by Amanda Doe CPhT.    Medication history obtained from, Patient Verified    You may go to "Admission" then "Reconcile Home Medications" tabs to review and/or act upon these items.      The home medication list has been updated by the Pharmacy department.    Please read ALL comments highlighted in yellow.    Please address this information as you see fit.     Feel free to contact us if you have any questions or require assistance.      The medications listed below were removed from the home medication list.  Please reorder if appropriate:  Patient reports no longer taking the following medication(s):   Plavix 75 mg   Vitamin D 50,000 units   Procardia XL 60 mg        Amanda Doe CPhT.  Ext [phone]                .          "

## 2022-07-21 NOTE — ASSESSMENT & PLAN NOTE
- presents with recurrence of melena  - Hb stable  - discussed with GI: suspect old blood rather than recurrence of bleeding  PPI daily  Serial h/h   Monitor all HD parameters and transfuse as needed  Tele

## 2022-07-21 NOTE — H&P
[facility]  [facility] Medicine  History & Physical    Patient Name: Sherin Ortiz  MRN: [id number]  Patient Class: Emergency  Admission Date: 7/20/2022  Attending Physician: Derrick Viera, *   Primary Care Provider: Deedee Calderon MD         Patient information was obtained from patient, past medical records and ER records.     Subjective:     Principal Problem:<principal problem not specified>    Chief Complaint:   Chief Complaint   Patient presents with    Rectal Bleeding     Dark stools did not improve after surgery Monday. Denies abd pain.     Hypotension     Low BP when she stands.         HPI: 77F recent admission for GIB. She underwent EGD which revealed actively bleeding Dieulafoy lesion in the proximal jejunum - ablated with APC, hemoclips then marked with Celia Ink. She was DC to home unfortunately she had increasing weakness and said she continued to have melanotic stools. Patient complains of difficulty transferring due to weakness in legs. Patient states that she did not ambulate in hospital at all. She was occult positive in the ER and the ER MD talked to GI who plans on repeat endoscopy in AM. M consulted for admission. LUIS ARMANDO ER MD.     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    Benign essential hypertension 11/14/2012    Cataract     s/p phacoemulsification    Chronic kidney disease (CKD), stage III (moderate) 9/12/2013    Diabetic peripheral neuropathy associated with type 2 diabetes mellitus 11/14/2014    causing right hemiparesis    Gait disorder     Hyperlipidemia     Iritis - Both Eyes 6/10/2013    Kidney stone     Lymphedema     Morbid obesity with BMI of 40.0-44.9, adult 2/18/2015    Nephrolithiasis 4/20/2016    NS (nuclear sclerosis) 4/1/2013    Nuclear sclerosis - Both Eyes 4/29/2013    Preseptal cellulitis -  Right Eye 4/29/2013    Proliferative diabetic retinopathy - Both Eyes 4/29/2013    Proliferative diabetic retinopathy, both eyes 4/1/2013    PSC (posterior subcapsular cataract) - Both Eyes 4/29/2013    S/P hernia repair 12/19/2012    TIA (transient ischemic attack) 11/18/2014    Tinea pedis 7/24/2012    Tinea pedis is present on both feet.     Type 2 diabetes mellitus with diabetic polyneuropathy, with long-term current use of insulin 9/18/2015    Type 2 diabetes mellitus with renal manifestations, controlled 12/12/2013    Type 2 diabetes, controlled, with moderate nonproliferative diabetic retinopathy without macular edema 9/17/2015    Ulcer of left lower extremity, limited to breakdown of skin 7/8/2015    Unspecified cerebral artery occlusion with cerebral infarction 11/16/2014    Unspecified venous (peripheral) insufficiency     Ureteral stone with hydronephrosis 1/27/2016    UTI (lower urinary tract infection)     Vaginal infection     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       Past Surgical History:   Procedure Laterality Date    ABLATION Bilateral 6/23/2022    Procedure: Ablation;  Surgeon: Vahid Farfan MD;  Location: [facility] CATH LAB/EP;  Service: Cardiology;  Laterality: Bilateral;    APPENDECTOMY      CATARACT EXTRACTION W/  INTRAOCULAR LENS IMPLANT Left 5/21/2013    CATARACT EXTRACTION W/  INTRAOCULAR LENS IMPLANT Right 6/4/2013    CHOLECYSTECTOMY      COLONOSCOPY  12/22/2005    normal    COLONOSCOPY N/A 7/14/2022    Procedure: COLONOSCOPY;  Surgeon: Kannan Mace MD;  Location: [facility] ENDO;  Service: Endoscopy;  Laterality: N/A;    ESOPHAGOGASTRODUODENOSCOPY  12/21/2015    hiatal hernia, Schatzki ring    ESOPHAGOGASTRODUODENOSCOPY N/A 7/13/2022    Procedure: EGD (ESOPHAGOGASTRODUODENOSCOPY);  Surgeon: Kannan Mace MD;  Location: [facility] ENDO;  Service: Endoscopy;  Laterality: N/A;    EYE SURGERY Bilateral 2008    laser surgery both eyes    INTRALUMINAL GASTROINTESTINAL TRACT IMAGING VIA CAPSULE  "N/A 7/15/2022    Procedure: IMAGING PROCEDURE, GI TRACT, INTRALUMINAL, VIA CAPSULE;  Surgeon: Kannan Mace MD;  Location: [facility] ENDO;  Service: Endoscopy;  Laterality: N/A;    NASAL SEPTUM SURGERY      SMALL BOWEL ENTEROSCOPY N/A 7/18/2022    Procedure: ENTEROSCOPY Upper SBE;  Surgeon: Salo Frank MD;  Location: [facility] ENDO;  Service: Endoscopy;  Laterality: N/A;    SUBTOTAL COLECTOMY  12/13/2012    transverse colon, for incarcerated umbilical hernia, Dr. Kat Bower       Review of patient's allergies indicates:   Allergen Reactions    Penicillins Hives     Other reaction(s): Hives  Patient has received cefdinir, ceftriaxone, cefazolin and cefepime in the past with no documented reactions    Sulfa (sulfonamide antibiotics) Other (See Comments)     Shakes, pt states her doctor told her the shakes were possibly caused by an allergy to sulfa       No current facility-administered medications on file prior to encounter.     Current Outpatient Medications on File Prior to Encounter   Medication Sig    ascorbic acid, vitamin C, (VITAMIN C) 500 MG tablet Take 500 mg by mouth once daily.    aspirin 81 MG Chew Chew 1 tablet (81 mg total) by mouth once daily.    atorvastatin (LIPITOR) 40 MG tablet Take 1 tablet (40 mg total) by mouth every evening.    insulin glargine (LANTUS U-100 INSULIN) 100 unit/mL injection Inject 10-15 Units into the skin every evening. DEPENDING ON SCALE (DISCARD EACH VIAL AFTER 28 DAYS)    pantoprazole (PROTONIX) 40 MG tablet Take 1 tablet (40 mg total) by mouth once daily.    BD INSULIN SYRINGE ULTRA-FINE 0.5 mL 30 gauge x 1/2" Syrg USE TO INJECT EVERY NIGHT    blood glucose control, high (TRUE METRIX LEVEL 3) Soln Used to calibrate weekly    blood sugar diagnostic (TRUE METRIX GLUCOSE TEST STRIP) Strp Test twice daily    insulin syr/ndl U100 half yesenia (DROPLET INSULIN SYR HALF UNIT) 0.5 mL 30 gauge x 1/2" Syrg USE TO INJECT EVERY NIGHT    lancets (TRUEPLUS LANCETS) 33 gauge Misc 1 " lancet by Misc.(Non-Drug; Combo Route) route 2 (two) times a day.    [DISCONTINUED] clopidogreL (PLAVIX) 75 mg tablet Take 1 tablet (75 mg total) by mouth once daily. (Patient not taking: No sig reported)    [DISCONTINUED] ergocalciferol (ERGOCALCIFEROL) 50,000 unit Cap Take 1 capsule (50,000 Units total) by mouth every 7 days. Weekly - 12 weeks, then monthly (Patient not taking: No sig reported)    [DISCONTINUED] NIFEdipine (PROCARDIA-XL) 60 MG (OSM) 24 hr tablet Take 1 tablet (60 mg total) by mouth once daily. (Patient not taking: No sig reported)     Family History       Problem Relation (Age of Onset)    Cataracts Sister, Brother    Diabetes Sister    Heart disease Brother    Leukemia Mother          Tobacco Use    Smoking status: Former Smoker     Packs/day: 0.50     Years: 15.00     Pack years: 7.50     Types: Cigarettes     Quit date: 1982     Years since quittin.0    Smokeless tobacco: Former User    Tobacco comment: smoked one pack per week   Substance and Sexual Activity    Alcohol use: No    Drug use: No    Sexual activity: Not Currently       Review of Systems:  GEN: Negative unless otherwise stated in history of present illness  HEENT: Negative unless otherwise stated in the history of present illness  NECK: Negative unless otherwise stated in history of present illness  RESPIRATORY: Negative unless otherwise stated in history of present illness  CARDIOVASCULAR: Negative unless otherwise stated in history of present illness  GI: Negative unless otherwise stated in history of present illness  : Negative unless otherwise stated in history of present illness  EXTR: Negative unless otherwise stated in history of present illness  SKIN: Negative unless otherwise stated in history of present illness  MUSCULOSKELETAL: Negative unless otherwise stated in history of present illness  NEURO: Negative unless otherwise stated in history of present illness  PSYCH: Negative unless otherwise stated in history  of present illness  Except as documented, all other systems reviewed and negative      Objective:     Vital Signs (Most Recent):  Temp: 98.3 °F (36.8 °C) (07/20/22 1640)  Pulse: 60 (07/20/22 1907)  Resp: 16 (07/20/22 1907)  BP: 139/65 (07/20/22 1901)  SpO2: 100 % (07/20/22 1907) Vital Signs (24h Range):  Temp:  [98.3 °F (36.8 °C)] 98.3 °F (36.8 °C)  Pulse:  [60-70] 60  Resp:  [16-22] 16  SpO2:  [100 %] 100 %  BP: (135-142)/(58-80) 139/65     Weight: 127 kg (280 lb)  Body mass index is 43.85 kg/m².    Physical Exam:  GEN:  No acute distress, lying in bed comfortably  HEENT:  Normocephalic, atraumatic, extra ocular movements intact  NECK:  Supple, good range of motion  RESPIRATORY:  Clear to auscultation, symmetrical chest rise  CARDIOVASCULAR:  Regular in rhythm,+s1/2  GI:  Soft, nontender, bowel sounds decreased  : No flank tenderness, normal genitalia  EXT: +edema, pulses palpated  SKIN: Warm, dry, no rashes  MUSCULOSKELETAL: Fair range of motion, no apparent atrophy  NEURO:  Alert, oriented, answering questions appropriately  PSYCH:  Mood & affect appropriate, pleasant    Significant Labs:    A1C: No results for input(s): HGBA1C in the last 24 hours.  Blood Culture: No results for input(s): LABBLOO in the last 24 hours.  BMP/CMP:   Recent Labs   Lab 07/20/22  1742      K 4.5   *   CO2 19*   *   BUN 35*   CREATININE 1.6*   CALCIUM 8.6*   PROT 5.8*   ALBUMIN 2.3*   BILITOT 0.3   ALKPHOS 147*   AST 14   ALT 9*   ANIONGAP 8   EGFRNONAA 30*     CBC:   Recent Labs   Lab 07/20/22  1742   WBC 4.08   HGB 8.1*   HCT 25.3*        Cardiac Markers: No results for input(s): CKMB, MYOGLOBIN, BNP, TROPISTAT in the last 24 hours.  Coagulation: No results for input(s): PT, INR, APTT in the last 24 hours.  Lactic Acid: No results for input(s): LACTATE in the last 24 hours.  Lipid Panel: No results for input(s): CHOL, HDL, LDLCALC, TRIG, CHOLHDL in the last 24 hours.  Magnesium: No results for input(s): MG  in the last 24 hours.  POCT Glucose: No results for input(s): POCTGLUCOSE in the last 24 hours.  Troponin: No results for input(s): TROPONINI in the last 24 hours.  TSH: No results for input(s): TSH in the last 24 hours.  Urine Culture: No results for input(s): LABURIN in the last 24 hours.  Urine Studies: No results for input(s): COLORU, APPEARANCEUA, PHUR, SPECGRAV, PROTEINUA, GLUCUA, KETONESU, BILIRUBINUA, OCCULTUA, NITRITE, UROBILINOGEN, LEUKOCYTESUR, RBCUA, WBCUA, BACTERIA, SQUAMEPITHEL, HYALINECASTS, WRIGHTSTUR in the last 24 hours.    Significant Imaging:     X-Ray Chest 1 View  Narrative: EXAMINATION:  XR CHEST 1 VIEW    CLINICAL HISTORY:  Weakness    TECHNIQUE:  Single frontal view of the chest was performed.    COMPARISON:  01/13/2021    FINDINGS:  The cardiac silhouette remains un enlarged with the trachea midline allowing for rotation to the left.  Prominence in the infrahilar lung markings especially on the right appear less pronounced.  There is no new consolidation or effusion.  Impression: No evidence of acute chest disease since 2021.    Electronically signed by: Damien Rodriguez  Date:    07/12/2022  Time:    21:32           Assessment/Plan:     ACP (advance care planning)    She says she is DNR    GIB (gastrointestinal bleeding)  GI c/s from POC planning on intervention  PPI BID  Serial h/h   Monitor all HD parameters and transfuse as needed  Tele      Type 2 diabetes mellitus with diabetic polyneuropathy, with long-term current use of insulin  Resume long-acting added SSI  Adjust based on trends      Stage 4 chronic kidney disease  Renal fxn at baseline   No nsaids or cox2 (-)  Repeat chem      Essential hypertension  Didn't see BP meds on home list   Would hold any BP meds for possible hypotension       Hyperlipidemia LDL goal <100  Resume STATIN        VTE Risk Mitigation (From admission, onward)           Ordered     IP VTE HIGH RISK PATIENT  Once         07/20/22 1935     [facility] sequential  compression device  Until discontinued         07/20/22 1935                       Mj Babb MD  Department of Hospital Medicine   [place] - Emergency Dept

## 2022-07-21 NOTE — PT/OT/SLP EVAL
Physical Therapy Evaluation    Patient Name:  Sherin Ortiz   MRN:  [id number]    Recommendations:     Discharge Recommendations:  nursing facility, skilled   Discharge Equipment Recommendations:  (TBD)   Barriers to discharge: Decreased caregiver support and increased assist needed    Assessment:     Sherin Ortiz is a 77 y.o. female admitted with a medical diagnosis of <principal problem not specified>.  She presents with the following impairments/functional limitations:  weakness, impaired balance, impaired endurance, impaired self care skills, impaired functional mobility, impaired cardiopulmonary response to activity, decreased lower extremity function, decreased upper extremity function, decreased ROM, impaired skin, pain, edema .Pt with decreased activity tolerance 2/2 weakness. Pt performed supine<>sit t/f with Jtet for BLE management. Pt unable/declined to attempt lateral seated scooting or sit>stand this date 2/2 fatigue/weakness. Recommending SNF placement.     Rehab Prognosis: Good; patient would benefit from acute skilled PT services to address these deficits and reach maximum level of function.    Recent Surgery: * No surgery found *      Plan:     During this hospitalization, patient to be seen 3 x/week to address the identified rehab impairments via therapeutic activities, therapeutic exercises, neuromuscular re-education, wheelchair management/training and progress toward the following goals:    · Plan of Care Expires:  08/21/22    Subjective     Chief Complaint: Weakness  Patient/Family Comments/goals: Return to PLOF  Pain/Comfort:  · Pain Rating 1:  (not rated)  · Location - Side 1: Right  · Location - Orientation 1: anterior  · Location 1: shoulder  · Pain Addressed 1: Reposition, Distraction, Cessation of Activity  · Pain Rating Post-Intervention 1:  (did not rate)    Patients cultural, spiritual, Spiritism conflicts given the current situation: no    Living Environment:  Pt lives alone in  a SSH, 0 DES, WIS with BIS and GB; son and DIL live across the street  Prior to admission, patients level of function was Mod I for basic ADLs and assist for cooking/grocery shopping. Pt reports taking the bus for transportation. Pt is able to transfer in/out power chair via squat pivot and uses BSC over toilet. pt reports wound care nurse every other week alternating w/visit to wound care clinic every other week for BLE dressing changes  Equipment used at home: bedside commode, shower chair, grab bar, power chair, lift device, bath bench.  DME owned (not currently used): none.  Upon discharge, patient will have assistance from family but limited.    Objective:     Communicated with nsg prior to session.  Patient found HOB elevated with peripheral IV, bed alarm, telemetry  upon PT entry to room.    General Precautions: Standard, fall   Orthopedic Precautions:N/A   Braces: N/A  Respiratory Status: Room air    Exams:  · Cognitive Exam:  Patient is oriented to Person, Place, Time and Situation  · Postural Exam:  Patient presented with the following abnormalities:    · -       Rounded shoulders  · -       Forward head  · Sensation: pt denies numbness/tingling  · Skin Integrity/Edema:      · -       Skin integrity: BLE wounds, dressed/wrapped  · -       Edema: Moderate BLE  · RLE ROM: WFL knee flex/ext; limited hip flexion 2/2 weakness and no AROM B ankle DF/PF  · RLE Strength: knee flex/ext grossly 4/5; hip flexion grossly 2/5; 0/5 ankle DF/PF  · LLE ROM: WFL knee flex/ext; limited hip flexion 2/2 weakness and no AROM B ankle DF/PF  · LLE Strength: knee flex/ext grossly 4/5; hip flexion grossly 2/5; 0/5 ankle DF/PF    Functional Mobility:  · Bed Mobility:     · Rolling Right: stand by assistance  · Scooting: stand by assistance for scooting forward EOB and MaxA x 2 for supine scoot toward HOB; pt refused lateral seated scooting along the side of bed 2/2 weakness  · Supine to Sit: minimum assistance   · Sit to Supine:  minimum assistance    Therapeutic Activities and Exercises:   Pt educated on role of PT/POC, d/c recs and importance of mobility.  Pt requires increased time and effort for all mobility.  Pt with decreased activity tolerance 2/2 weakness.   Pt performed supine<>sit t/f with Jett for BLE management.   Pt unable/declined to attempt lateral seated scooting or sit>stand this date 2/2 fatigue/weakness  Pt returned supine and scooted HOB.     AM-PAC 6 CLICK MOBILITY  Total Score:10     Patient left HOB elevated with all lines intact, call button in reach, bed alarm on and nsg notified.    GOALS:   Multidisciplinary Problems     Physical Therapy Goals        Problem: Physical Therapy    Goal Priority Disciplines Outcome Goal Variances Interventions   Physical Therapy Goal     PT, PT/OT Ongoing, Progressing     Description: Goals to be met by: 22     Patient will increase functional independence with mobility by performin. Supine to sit with Modified [place]  2. Sit to supine with Modified [place]  3. Sit to stand transfer with Modified [place]  4. Bed to chair transfer with Modified [place] using AD.  5. Lower extremity exercise program x10 reps per handout, with independence                     History:     Past Medical History:   Diagnosis Date    Allergy     Asteroid hyalosis - Left Eye 2013    Benign essential hypertension 2012    Cataract     s/p phacoemulsification    Chronic kidney disease (CKD), stage III (moderate) 2013    Diabetic peripheral neuropathy associated with type 2 diabetes mellitus 2014    causing right hemiparesis    Gait disorder     Hyperlipidemia     Iritis - Both Eyes 6/10/2013    Kidney stone     Lymphedema     Morbid obesity with BMI of 40.0-44.9, adult 2015    Nephrolithiasis 2016    NS (nuclear sclerosis) 2013    Nuclear sclerosis - Both Eyes 2013    Preseptal cellulitis - Right Eye 2013     Proliferative diabetic retinopathy - Both Eyes 4/29/2013    Proliferative diabetic retinopathy, both eyes 4/1/2013    PSC (posterior subcapsular cataract) - Both Eyes 4/29/2013    S/P hernia repair 12/19/2012    TIA (transient ischemic attack) 11/18/2014    Tinea pedis 7/24/2012    Tinea pedis is present on both feet.     Type 2 diabetes mellitus with diabetic polyneuropathy, with long-term current use of insulin 9/18/2015    Type 2 diabetes mellitus with renal manifestations, controlled 12/12/2013    Type 2 diabetes, controlled, with moderate nonproliferative diabetic retinopathy without macular edema 9/17/2015    Ulcer of left lower extremity, limited to breakdown of skin 7/8/2015    Unspecified cerebral artery occlusion with cerebral infarction 11/16/2014    Unspecified venous (peripheral) insufficiency     Ureteral stone with hydronephrosis 1/27/2016    UTI (lower urinary tract infection)     Vaginal infection     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       Past Surgical History:   Procedure Laterality Date    ABLATION Bilateral 6/23/2022    Procedure: Ablation;  Surgeon: Vahid Farfan MD;  Location: [facility] CATH LAB/EP;  Service: Cardiology;  Laterality: Bilateral;    APPENDECTOMY      CATARACT EXTRACTION W/  INTRAOCULAR LENS IMPLANT Left 5/21/2013    CATARACT EXTRACTION W/  INTRAOCULAR LENS IMPLANT Right 6/4/2013    CHOLECYSTECTOMY      COLONOSCOPY  12/22/2005    normal    COLONOSCOPY N/A 7/14/2022    Procedure: COLONOSCOPY;  Surgeon: Kannan Mace MD;  Location: [facility] ENDO;  Service: Endoscopy;  Laterality: N/A;    ESOPHAGOGASTRODUODENOSCOPY  12/21/2015    hiatal hernia, Schatzki ring    ESOPHAGOGASTRODUODENOSCOPY N/A 7/13/2022    Procedure: EGD (ESOPHAGOGASTRODUODENOSCOPY);  Surgeon: Kannan Mace MD;  Location: [facility] ENDO;  Service: Endoscopy;  Laterality: N/A;    EYE SURGERY Bilateral 2008    laser surgery both eyes    INTRALUMINAL GASTROINTESTINAL TRACT IMAGING VIA  CAPSULE N/A 7/15/2022    Procedure: IMAGING PROCEDURE, GI TRACT, INTRALUMINAL, VIA CAPSULE;  Surgeon: Kannan Mace MD;  Location: [facility] ENDO;  Service: Endoscopy;  Laterality: N/A;    NASAL SEPTUM SURGERY      SMALL BOWEL ENTEROSCOPY N/A 7/18/2022    Procedure: ENTEROSCOPY Upper SBE;  Surgeon: Salo Frank MD;  Location: [facility] ENDO;  Service: Endoscopy;  Laterality: N/A;    SUBTOTAL COLECTOMY  12/13/2012    transverse colon, for incarcerated umbilical hernia, Dr. Kat Bower       Time Tracking:     PT Received On: 07/21/22  PT Start Time: 1005     PT Stop Time: 1033  PT Total Time (min): 28 min     Billable Minutes: Evaluation 10 and Therapeutic Activity 18 (cotx with OT)      07/21/2022

## 2022-07-21 NOTE — CONSULTS
"[facility]    CC: Melena    HPI 77 y.o. female with history of dieulafoy lesion in the proximal jejunum recently ablated with APC and hemoclips on 7/18. Patient presenting due to one episode of melanotic stool yesterday at 11 AM with no further episodes. She reports she had not had any abdominal pain, hematochezia, nausea, or emesis. She reports a good appetite without any other issues at this time.    Chart reviewed and summarized here.    Past Medical History  CKD IV  Type II DM  HTN  HLD  Cataracts    Past Surgical History  Appendectomy  EGD  Colonoscopy    Social History  Former smoker; denies alcohol or drug use    Family History  No family history of colon cancer.    Review of Systems  General ROS: negative for chills, fever or weight loss  Psychological ROS: negative for hallucination, depression or suicidal ideation  Ophthalmic ROS: negative for blurry vision, photophobia or eye pain  ENT ROS: negative for epistaxis, sore throat or rhinorrhea  Respiratory ROS: no cough, shortness of breath, or wheezing  Cardiovascular ROS: no chest pain or dyspnea on exertion  Gastrointestinal ROS: no abdominal pain, change in bowel habits; + dark stools  Genito-Urinary ROS: no dysuria, trouble voiding, or hematuria  Musculoskeletal ROS: negative for gait disturbance or muscular weakness  Neurological ROS: no syncope or seizures; no ataxia  Dermatological ROS: negative for pruritis, rash and jaundice    Physical Examination  /62 (BP Location: Left arm, Patient Position: Lying)   Pulse 60   Temp 97.8 °F (36.6 °C) (Oral)   Resp 19   Ht 5' 7" (1.702 m)   Wt 127.2 kg (280 lb 6.8 oz)   LMP  (LMP Unknown) Comment: patient refused to weigh/wounds to legs and toes   SpO2 97%   BMI 43.92 kg/m²   General appearance: Obese, alert, cooperative, no distress  HENT: Normocephalic, atraumatic, neck symmetrical, no nasal discharge   Eyes: conjunctivae/corneas clear, EOM's intact  Lungs: clear to auscultation " bilaterally, no dullness to percussion bilaterally  Heart: regular rate and rhythm without rub; no displacement of the PMI   Abdomen: soft, non-tender; bowel sounds normoactive; no organomegaly  Extremities: extremities symmetric; no clubbing, cyanosis, or edema  Integument: Skin color, texture, turgor normal; no rashes; hair distrubution normal  Neurologic: Alert and conversant  Psychiatric: no pressured speech; normal affect; no evidence of impaired cognition     Labs:  Hb = 8 I HCT = 25    Assessment:   Mrs. Ortiz is a 77 year old female with history of dieulafoy lesion in the proximal jejunum recently ablated with APC and hemoclips on 7/18  presenting with one episode of melanotic stool yesterday at 11 AM with no further episodes. Hemoglobin is stable to prior and patient without further episodes of melena. Likely etiology is dark stool seen is previous old blood in the colon.     Plan:   - No plans for endoscopic intervention at this time.  - Will /educate patient and family    Yumiko Nava MD  [employer] [facility] Fellow

## 2022-07-21 NOTE — PLAN OF CARE
[place] completed PASRR form and faxed completed signed form to [phone]. [place] called LOCET in to [phone] [facility]  [facility]. [place]ER[place]IA is awaiting approval and 142 receipt to continue with placement.        07/21/22 1241   Post-Acute Status   Post-Acute Authorization Placement   Post-Acute Placement Status Referrals Sent   Discharge Delays None known at this time   Discharge Plan   Discharge Plan A Skilled Nursing Facility     JOSTIN Marrufo Case Management  [phone]

## 2022-07-21 NOTE — PLAN OF CARE
Long discussion with daughter Hayley Velasco [phone].  Pt's baseline status at home is w/c bound but self transfers, performs all ADL's independently from w/c.  Hayley states pt's functional status and endurance has declined and is now unable to self transfer.  Discussed with PT/OT, agree pt would benefit from skilled nursing at time of d/c.  Family is requesting [facility], no beds available.  Discussed [place] SNF options with both daughters via [employer]way, decided on [facility] as only option.  Referral sent via [employer], will contact facility for update if no response in 1 hr.    [facility] [employer] reviewing, will contact  via [employer] with decision.    Urszula Law RN [facility]  Supervisor Case Management-Lakshmi  [phone]

## 2022-07-21 NOTE — PLAN OF CARE
Pt has been accepted by [facility] for skilled admission pending insurance authorization.  Spoke with Marley who states Nonya will contact family.  Hopeful for insurance approval and admission tomorrow.  Hayley made aware either she or sibling will need to be available to sign admission paperwork for anticipated d/c tomorrow.   Verbalized understanding.    Urszula Law RN [facility]  Supervisor Case Management-Lakshmi  [phone]

## 2022-07-21 NOTE — ASSESSMENT & PLAN NOTE
GI c/s from POC planning on intervention  PPI BID  Serial h/h   Monitor all HD parameters and transfuse as needed  Tele

## 2022-07-21 NOTE — PROGRESS NOTES
[facility]  Progress Note    Patient Name: Sherin Ortiz  MRN: [id number]  Patient Class: OP- Observation   Admission Date: 7/20/2022  Length of Stay: 0 days  Attending Physician: Derrick Viera, *  Primary Care Provider: Deedee Calderon MD        Subjective:     Principal Problem:<principal problem not specified>        HPI:  77F recent admission for GIB. She underwent EGD which revealed actively bleeding Dieulafoy lesion in the proximal jejunum - ablated with APC, hemoclips then marked with Celia Ink. She was DC to home unfortunately she had increasing weakness and said she continued to have melanotic stools. Patient complains of difficulty transferring due to weakness in legs. Patient states that she did not ambulate in hospital at all. She was occult positive in the ER and the ER MD talked to GI who plans on repeat endoscopy in AM. M consulted for admission. DW ER MD.       Overview/Hospital Course:  No notes on file    Interval History: reports episode of melena at home following recent discharge, but no lightheadedness, dizziness or ongoing diarrhea. Reports she has been very weak since her discharge and her daughters have had difficulty taking care of her at home. Request placement.    Review of Systems   Constitutional:  Positive for fatigue. Negative for fever.   Respiratory:  Negative for shortness of breath.    Cardiovascular:  Positive for leg swelling. Negative for chest pain.   Gastrointestinal:  Positive for blood in stool.   Neurological:  Positive for weakness and light-headedness.   Objective:     Vital Signs (Most Recent):  Temp: 97.8 °F (36.6 °C) (07/21/22 0758)  Pulse: 63 (07/21/22 1132)  Resp: 19 (07/21/22 1132)  BP: (!) 177/74 (07/21/22 1132)  SpO2: 98 % (07/21/22 1132)   Vital Signs (24h Range):  Temp:  [94.3 °F (34.6 °C)-98.3 °F (36.8 °C)] 97.8 °F (36.6 °C)  Pulse:  [54-70] 63  Resp:  [16-22] 19  SpO2:  [97 %-100 %] 98 %  BP: (131-177)/(58-96) 177/74      Weight: 127.2 kg (280 lb 6.8 oz)  Body mass index is 43.92 kg/m².    Intake/Output Summary (Last 24 hours) at 7/21/2022 1313  Last data filed at 7/21/2022 0628  Gross per 24 hour   Intake --   Output 450 ml   Net -450 ml      Physical Exam  Vitals and nursing note reviewed.   Constitutional:       General: She is not in acute distress.     Appearance: Normal appearance. She is obese. She is not ill-appearing or diaphoretic.   HENT:      Head: Normocephalic and atraumatic.      Mouth/Throat:      Mouth: Mucous membranes are dry.   Eyes:      Extraocular Movements: Extraocular movements intact.      Pupils: Pupils are equal, round, and reactive to light.   Cardiovascular:      Rate and Rhythm: Normal rate and regular rhythm.      Pulses: Normal pulses.      Heart sounds: Normal heart sounds.   Pulmonary:      Effort: Pulmonary effort is normal. No respiratory distress.      Breath sounds: Normal breath sounds.   Abdominal:      General: Bowel sounds are normal. There is no distension.      Palpations: Abdomen is soft.      Tenderness: There is no abdominal tenderness. There is no guarding.   Musculoskeletal:         General: No swelling. Normal range of motion.      Cervical back: Normal range of motion.      Right lower leg: Edema present.      Left lower leg: Edema present.   Skin:     General: Skin is warm.      Capillary Refill: Capillary refill takes 2 to 3 seconds.   Neurological:      General: No focal deficit present.      Mental Status: She is alert. Mental status is at baseline.   Psychiatric:         Mood and Affect: Mood normal.         Behavior: Behavior normal.         Thought Content: Thought content normal.       Significant Labs: All pertinent labs within the past 24 hours have been reviewed.    Significant Imaging: I have reviewed all pertinent imaging results/findings within the past 24 hours.      Assessment/Plan:      ACP (advance care planning)  - DNR    GIB (gastrointestinal bleeding)  -  presents with recurrence of melena  - Hb stable  - discussed with GI: suspect old blood rather than recurrence of bleeding  PPI daily  Serial h/h   Monitor all HD parameters and transfuse as needed  Tele      Type 2 diabetes mellitus with diabetic polyneuropathy, with long-term current use of insulin  Resume long-acting added SSI  Adjust based on trends      Stage 4 chronic kidney disease  Renal fxn at baseline   No nsaids or cox2 (-)  Repeat chem      Debility  - very weak following recent hospitalization  - PT/OT consult: recommend SNF      Essential hypertension  Didn't see BP meds on home list   Would hold any BP meds for possible hypotension       Hyperlipidemia LDL goal <100  -continue statin        VTE Risk Mitigation (From admission, onward)         Ordered     IP VTE HIGH RISK PATIENT  Once         07/20/22 1935     Place sequential compression device  Until discontinued         07/20/22 1935                Discharge Planning   GABE: 7/22/2022     Code Status: DNR   Is the patient medically ready for discharge?:     Reason for patient still in hospital (select all that apply): PT / OT recommendations and Pending disposition  Discharge Plan A: Skilled Nursing Facility   Discharge Delays: None known at this time              Derrick Viera MD  Department of Hospital Medicine   [place] - Telemetry

## 2022-07-21 NOTE — PT/OT/SLP EVAL
Occupational Therapy   Evaluation    Name: Sherin Ortiz  MRN: [id number]  Admitting Diagnosis:  <principal problem not specified>  Recent Surgery: * No surgery found *      Recommendations:     Discharge Recommendations: nursing facility, skilled  Discharge Equipment Recommendations:  other (see comments) (TBD)  Barriers to discharge:  None    Assessment:     Sherin Ortiz is a 77 y.o. female with a medical diagnosis of <principal problem not specified>.  She presents with performance deficits affecting function: weakness, impaired endurance, impaired self care skills, impaired functional mobility, gait instability, impaired balance, decreased lower extremity function, decreased upper extremity function, decreased ROM, edema, impaired skin, pain, impaired cardiopulmonary response to activity.      Rehab Prognosis: Good; patient would benefit from acute skilled OT services to address these deficits and reach maximum level of function.       Plan:     Patient to be seen 3 x/week to address the above listed problems via self-care/home management, therapeutic activities, therapeutic exercises  · Plan of Care Expires: 08/21/22  · Plan of Care Reviewed with: patient    Subjective     Chief Complaint: weakness  Patient/Family Comments/goals: return to OF    Occupational Profile:  Living Environment: Lives alone in [facility], no [facility], [facility] w/built in ench and grab bars; son and DIL live across the street  Previous level of function: pt reports mod I for basic ADLs, assist for IADLs, and assist for basic ADLs as needed  Roles and Routines: sedentary; pt reports wounc care nurse every other week alternating w/visit to wound care clinic every other week for BLE dressing changes  Equipment Used at Home:  bedside commode, shower chair, grab bar, power chair, lift device  Assistance upon Discharge: family    Pain/Comfort:  · Pain Rating 1: other (see comments) (did not rate)  · Location - Side 1: Right  · Location - Orientation 1:  anterior  · Location 1: shoulder  · Pain Addressed 1: Reposition, Cessation of Activity, Distraction  · Pain Rating Post-Intervention 1: other (see comments) (did not rate)    Patients cultural, spiritual, Shinto conflicts given the current situation: no    Objective:     Communicated with: nurse prior to session.  Patient found HOB elevated with peripheral IV, bed alarm, telemetry upon OT entry to room.    General Precautions: Standard, fall   Orthopedic Precautions:    Braces:    Respiratory Status: Room air    Occupational Performance:    Bed Mobility:    · Patient completed Rolling/Turning to Right with stand by assistance  · Patient completed Scooting/Bridging with stand by assistance and forward to EOB; max A of 2 in supine to scoot up to HOB; refused lateral scooting 2/2 weakness  · Patient completed Supine to Sit with minimum assistance  · Patient completed Sit to Supine with minimum assistance    Functional Mobility/Transfers:  · Patient declinedSit <> Stand Transfer 2/2 weakness  · Functional Mobility: declined 2/2 weakness    Activities of Daily Living:  · Lower Body Dressing: total assistance socks    Cognitive/Visual Perceptual:  AO4    Physical Exam:  LUE AROM/strength WFL  RUE AROM limited at shoulder for fxnl mvmt 2/2 pt reports recent shoulder injury dring picking up frying pan, elbow distally WFL  Fair+ dynamic sit balance, good static sit balance    AMPAC 6 Click ADL:  AMPAC Total Score: 15    Treatment & Education:  Pt educated on role of OT/POC  Education:    Patient left HOB elevated with all lines intact, call button in reach, bed alarm on and nurse notified    GOALS:   Multidisciplinary Problems     Occupational Therapy Goals        Problem: Occupational Therapy    Goal Priority Disciplines Outcome Interventions   Occupational Therapy Goal     OT, PT/OT Ongoing, Progressing    Description: Goals to be met by: 8/21     Patient will increase functional independence with ADLs by  performing:    UE Dressing with Stand-by Assistance.  LE Dressing with Stand-by Assistance.  Grooming while seated with Stand-by Assistance.  Toileting from bedside commode with Stand-by Assistance for hygiene and clothing management.   Toilet transfer to bedside commode with Stand-by Assistance.  Upper extremity exercise program x10 reps per handout, with independence.                     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    Benign essential hypertension 11/14/2012    Cataract     s/p phacoemulsification    Chronic kidney disease (CKD), stage III (moderate) 9/12/2013    Diabetic peripheral neuropathy associated with type 2 diabetes mellitus 11/14/2014    causing right hemiparesis    Gait disorder     Hyperlipidemia     Iritis - Both Eyes 6/10/2013    Kidney stone     Lymphedema     Morbid obesity with BMI of 40.0-44.9, adult 2/18/2015    Nephrolithiasis 4/20/2016    NS (nuclear sclerosis) 4/1/2013    Nuclear sclerosis - Both Eyes 4/29/2013    Preseptal cellulitis - Right Eye 4/29/2013    Proliferative diabetic retinopathy - Both Eyes 4/29/2013    Proliferative diabetic retinopathy, both eyes 4/1/2013    PSC (posterior subcapsular cataract) - Both Eyes 4/29/2013    S/P hernia repair 12/19/2012    TIA (transient ischemic attack) 11/18/2014    Tinea pedis 7/24/2012    Tinea pedis is present on both feet.     Type 2 diabetes mellitus with diabetic polyneuropathy, with long-term current use of insulin 9/18/2015    Type 2 diabetes mellitus with renal manifestations, controlled 12/12/2013    Type 2 diabetes, controlled, with moderate nonproliferative diabetic retinopathy without macular edema 9/17/2015    Ulcer of left lower extremity, limited to breakdown of skin 7/8/2015    Unspecified cerebral artery occlusion with cerebral infarction 11/16/2014    Unspecified venous (peripheral) insufficiency     Ureteral stone with  hydronephrosis 1/27/2016    UTI (lower urinary tract infection)     Vaginal infection     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       Past Surgical History:   Procedure Laterality Date    ABLATION Bilateral 6/23/2022    Procedure: Ablation;  Surgeon: Vahid Farfan MD;  Location: [facility] CATH LAB/EP;  Service: Cardiology;  Laterality: Bilateral;    APPENDECTOMY      CATARACT EXTRACTION W/  INTRAOCULAR LENS IMPLANT Left 5/21/2013    CATARACT EXTRACTION W/  INTRAOCULAR LENS IMPLANT Right 6/4/2013    CHOLECYSTECTOMY      COLONOSCOPY  12/22/2005    normal    COLONOSCOPY N/A 7/14/2022    Procedure: COLONOSCOPY;  Surgeon: Kannan Mace MD;  Location: [facility] ENDO;  Service: Endoscopy;  Laterality: N/A;    ESOPHAGOGASTRODUODENOSCOPY  12/21/2015    hiatal hernia, Schatzki ring    ESOPHAGOGASTRODUODENOSCOPY N/A 7/13/2022    Procedure: EGD (ESOPHAGOGASTRODUODENOSCOPY);  Surgeon: Kannan Mace MD;  Location: [facility] ENDO;  Service: Endoscopy;  Laterality: N/A;    EYE SURGERY Bilateral 2008    laser surgery both eyes    INTRALUMINAL GASTROINTESTINAL TRACT IMAGING VIA CAPSULE N/A 7/15/2022    Procedure: IMAGING PROCEDURE, GI TRACT, INTRALUMINAL, VIA CAPSULE;  Surgeon: Kannan Mace MD;  Location: [facility] ENDO;  Service: Endoscopy;  Laterality: N/A;    NASAL SEPTUM SURGERY      SMALL BOWEL ENTEROSCOPY N/A 7/18/2022    Procedure: ENTEROSCOPY Upper SBE;  Surgeon: Salo Frank MD;  Location: [facility] ENDO;  Service: Endoscopy;  Laterality: N/A;    SUBTOTAL COLECTOMY  12/13/2012    transverse colon, for incarcerated umbilical hernia, Dr. Kat Bower       Time Tracking:     OT Date of Treatment: 07/21/22  OT Start Time: 1008  OT Stop Time: 1034  OT Total Time (min): 26 min  w/PT  Billable Minutes:Evaluation 15    7/21/2022

## 2022-07-21 NOTE — SUBJECTIVE & OBJECTIVE
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    Benign essential hypertension 11/14/2012    Cataract     s/p phacoemulsification    Chronic kidney disease (CKD), stage III (moderate) 9/12/2013    Diabetic peripheral neuropathy associated with type 2 diabetes mellitus 11/14/2014    causing right hemiparesis    Gait disorder     Hyperlipidemia     Iritis - Both Eyes 6/10/2013    Kidney stone     Lymphedema     Morbid obesity with BMI of 40.0-44.9, adult 2/18/2015    Nephrolithiasis 4/20/2016    NS (nuclear sclerosis) 4/1/2013    Nuclear sclerosis - Both Eyes 4/29/2013    Preseptal cellulitis - Right Eye 4/29/2013    Proliferative diabetic retinopathy - Both Eyes 4/29/2013    Proliferative diabetic retinopathy, both eyes 4/1/2013    PSC (posterior subcapsular cataract) - Both Eyes 4/29/2013    S/P hernia repair 12/19/2012    TIA (transient ischemic attack) 11/18/2014    Tinea pedis 7/24/2012    Tinea pedis is present on both feet.     Type 2 diabetes mellitus with diabetic polyneuropathy, with long-term current use of insulin 9/18/2015    Type 2 diabetes mellitus with renal manifestations, controlled 12/12/2013    Type 2 diabetes, controlled, with moderate nonproliferative diabetic retinopathy without macular edema 9/17/2015    Ulcer of left lower extremity, limited to breakdown of skin 7/8/2015    Unspecified cerebral artery occlusion with cerebral infarction 11/16/2014    Unspecified venous (peripheral) insufficiency     Ureteral stone with hydronephrosis 1/27/2016    UTI (lower urinary tract infection)     Vaginal infection     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       Past Surgical History:   Procedure Laterality Date    ABLATION Bilateral 6/23/2022    Procedure: Ablation;  Surgeon: Vahid Farfan MD;  Location: [facility] CATH LAB/EP;  Service: Cardiology;  Laterality: Bilateral;    APPENDECTOMY      CATARACT EXTRACTION W/  INTRAOCULAR LENS IMPLANT Left 5/21/2013     CATARACT EXTRACTION W/  INTRAOCULAR LENS IMPLANT Right 6/4/2013    CHOLECYSTECTOMY      COLONOSCOPY  12/22/2005    normal    COLONOSCOPY N/A 7/14/2022    Procedure: COLONOSCOPY;  Surgeon: Kannan Mace MD;  Location: [facility] ENDO;  Service: Endoscopy;  Laterality: N/A;    ESOPHAGOGASTRODUODENOSCOPY  12/21/2015    hiatal hernia, Schatzki ring    ESOPHAGOGASTRODUODENOSCOPY N/A 7/13/2022    Procedure: EGD (ESOPHAGOGASTRODUODENOSCOPY);  Surgeon: Kannan Mace MD;  Location: [facility];  Service: Endoscopy;  Laterality: N/A;    EYE SURGERY Bilateral 2008    laser surgery both eyes    INTRALUMINAL GASTROINTESTINAL TRACT IMAGING VIA CAPSULE N/A 7/15/2022    Procedure: IMAGING PROCEDURE, GI TRACT, INTRALUMINAL, VIA CAPSULE;  Surgeon: Kannan Mace MD;  Location: [facility] ENDO;  Service: Endoscopy;  Laterality: N/A;    NASAL SEPTUM SURGERY      SMALL BOWEL ENTEROSCOPY N/A 7/18/2022    Procedure: ENTEROSCOPY Upper SBE;  Surgeon: Salo Frank MD;  Location: [facility];  Service: Endoscopy;  Laterality: N/A;    SUBTOTAL COLECTOMY  12/13/2012    transverse colon, for incarcerated umbilical hernia, Dr. Kat Bower       Review of patient's allergies indicates:   Allergen Reactions    Penicillins Hives     Other reaction(s): Hives  Patient has received cefdinir, ceftriaxone, cefazolin and cefepime in the past with no documented reactions    Sulfa (sulfonamide antibiotics) Other (See Comments)     Shakes, pt states her doctor told her the shakes were possibly caused by an allergy to sulfa       No current facility-administered medications on file prior to encounter.     Current Outpatient Medications on File Prior to Encounter   Medication Sig    ascorbic acid, vitamin C, (VITAMIN C) 500 MG tablet Take 500 mg by mouth once daily.    aspirin 81 MG Chew Chew 1 tablet (81 mg total) by mouth once daily.    atorvastatin (LIPITOR) 40 MG tablet Take 1 tablet (40 mg total) by mouth every evening.    insulin glargine (LANTUS  "U-100 INSULIN) 100 unit/mL injection Inject 10-15 Units into the skin every evening. DEPENDING ON SCALE (DISCARD EACH VIAL AFTER 28 DAYS)    pantoprazole (PROTONIX) 40 MG tablet Take 1 tablet (40 mg total) by mouth once daily.    BD INSULIN SYRINGE ULTRA-FINE 0.5 mL 30 gauge x 1/2" Syrg USE TO INJECT EVERY NIGHT    blood glucose control, high (TRUE METRIX LEVEL 3) Soln Used to calibrate weekly    blood sugar diagnostic (TRUE METRIX GLUCOSE TEST STRIP) Strp Test twice daily    insulin syr/ndl U100 half yesenia (DROPLET INSULIN SYR HALF UNIT) 0.5 mL 30 gauge x 1/2" Syrg USE TO INJECT EVERY NIGHT    lancets (TRUEPLUS LANCETS) 33 gauge Misc 1 lancet by Misc.(Non-Drug; Combo Route) route 2 (two) times a day.    [DISCONTINUED] clopidogreL (PLAVIX) 75 mg tablet Take 1 tablet (75 mg total) by mouth once daily. (Patient not taking: No sig reported)    [DISCONTINUED] ergocalciferol (ERGOCALCIFEROL) 50,000 unit Cap Take 1 capsule (50,000 Units total) by mouth every 7 days. Weekly - 12 weeks, then monthly (Patient not taking: No sig reported)    [DISCONTINUED] NIFEdipine (PROCARDIA-XL) 60 MG (OSM) 24 hr tablet Take 1 tablet (60 mg total) by mouth once daily. (Patient not taking: No sig reported)     Family History       Problem Relation (Age of Onset)    Cataracts Sister, Brother    Diabetes Sister    Heart disease Brother    Leukemia Mother          Tobacco Use    Smoking status: Former Smoker     Packs/day: 0.50     Years: 15.00     Pack years: 7.50     Types: Cigarettes     Quit date: 1982     Years since quittin.0    Smokeless tobacco: Former User    Tobacco comment: smoked one pack per week   Substance and Sexual Activity    Alcohol use: No    Drug use: No    Sexual activity: Not Currently       Review of Systems:  GEN: Negative unless otherwise stated in history of present illness  HEENT: Negative unless otherwise stated in the history of present illness  NECK: Negative unless otherwise stated in history of present " illness  RESPIRATORY: Negative unless otherwise stated in history of present illness  CARDIOVASCULAR: Negative unless otherwise stated in history of present illness  GI: Negative unless otherwise stated in history of present illness  : Negative unless otherwise stated in history of present illness  EXTR: Negative unless otherwise stated in history of present illness  SKIN: Negative unless otherwise stated in history of present illness  MUSCULOSKELETAL: Negative unless otherwise stated in history of present illness  NEURO: Negative unless otherwise stated in history of present illness  PSYCH: Negative unless otherwise stated in history of present illness  Except as documented, all other systems reviewed and negative      Objective:     Vital Signs (Most Recent):  Temp: 98.3 °F (36.8 °C) (07/20/22 1640)  Pulse: 60 (07/20/22 1907)  Resp: 16 (07/20/22 1907)  BP: 139/65 (07/20/22 1901)  SpO2: 100 % (07/20/22 1907) Vital Signs (24h Range):  Temp:  [98.3 °F (36.8 °C)] 98.3 °F (36.8 °C)  Pulse:  [60-70] 60  Resp:  [16-22] 16  SpO2:  [100 %] 100 %  BP: (135-142)/(58-80) 139/65     Weight: 127 kg (280 lb)  Body mass index is 43.85 kg/m².    Physical Exam:  GEN:  No acute distress, lying in bed comfortably  HEENT:  Normocephalic, atraumatic, extra ocular movements intact  NECK:  Supple, good range of motion  RESPIRATORY:  Clear to auscultation, symmetrical chest rise  CARDIOVASCULAR:  Regular in rhythm,+s1/2  GI:  Soft, nontender, bowel sounds decreased  : No flank tenderness, normal genitalia  EXT: +edema, pulses palpated  SKIN: Warm, dry, no rashes  MUSCULOSKELETAL: Fair range of motion, no apparent atrophy  NEURO:  Alert, oriented, answering questions appropriately  PSYCH:  Mood & affect appropriate, pleasant    Significant Labs:    A1C: No results for input(s): HGBA1C in the last 24 hours.  Blood Culture: No results for input(s): LABBLOO in the last 24 hours.  BMP/CMP:   Recent Labs   Lab 07/20/22  1742      K  4.5   *   CO2 19*   *   BUN 35*   CREATININE 1.6*   CALCIUM 8.6*   PROT 5.8*   ALBUMIN 2.3*   BILITOT 0.3   ALKPHOS 147*   AST 14   ALT 9*   ANIONGAP 8   EGFRNONAA 30*     CBC:   Recent Labs   Lab 07/20/22  1742   WBC 4.08   HGB 8.1*   HCT 25.3*        Cardiac Markers: No results for input(s): CKMB, MYOGLOBIN, BNP, TROPISTAT in the last 24 hours.  Coagulation: No results for input(s): PT, INR, APTT in the last 24 hours.  Lactic Acid: No results for input(s): LACTATE in the last 24 hours.  Lipid Panel: No results for input(s): CHOL, HDL, LDLCALC, TRIG, CHOLHDL in the last 24 hours.  Magnesium: No results for input(s): MG in the last 24 hours.  POCT Glucose: No results for input(s): POCTGLUCOSE in the last 24 hours.  Troponin: No results for input(s): TROPONINI in the last 24 hours.  TSH: No results for input(s): TSH in the last 24 hours.  Urine Culture: No results for input(s): LABURIN in the last 24 hours.  Urine Studies: No results for input(s): COLORU, APPEARANCEUA, PHUR, SPECGRAV, PROTEINUA, GLUCUA, KETONESU, BILIRUBINUA, OCCULTUA, NITRITE, UROBILINOGEN, LEUKOCYTESUR, RBCUA, WBCUA, BACTERIA, SQUAMEPITHEL, HYALINECASTS, WRIGHTSTUR in the last 24 hours.    Significant Imaging:     X-Ray Chest 1 View  Narrative: EXAMINATION:  XR CHEST 1 VIEW    CLINICAL HISTORY:  Weakness    TECHNIQUE:  Single frontal view of the chest was performed.    COMPARISON:  01/13/2021    FINDINGS:  The cardiac silhouette remains un enlarged with the trachea midline allowing for rotation to the left.  Prominence in the infrahilar lung markings especially on the right appear less pronounced.  There is no new consolidation or effusion.  Impression: No evidence of acute chest disease since 2021.    Electronically signed by: Damien Rodriguez  Date:    07/12/2022  Time:    21:32

## 2022-07-21 NOTE — PROGRESS NOTES
[facility]           Pharmacy        Current Drug Shortage     Due to national backorder and [facility] is critically low on inventory of Dextrose 50% (D50) Syringes and Vials, pharmacy has automatically switched from D50% to D10% IVPB at the equivalent dose until resolution of the shortage per P&T approved protocol.               Eloy Vogel PharmD, [facility]  [phone]

## 2022-07-21 NOTE — PLAN OF CARE
Problem: Physical Therapy  Goal: Physical Therapy Goal  Description: Goals to be met by: 22     Patient will increase functional independence with mobility by performin. Supine to sit with Modified Dewar  2. Sit to supine with Modified Dewar  3. Sit to stand transfer with Modified Dewar  4. Bed to chair transfer with Modified Dewar using AD.  5. Lower extremity exercise program x10 reps per handout, with independence    Outcome: Ongoing, Progressing     PT Eval completed, note to follow. Pt with decreased activity tolerance 2/2 weakness. Pt performed supine<>sit t/f with Jett for BLE management. Pt unable/declined to attempt lateral seated scooting or sit>stand this date 2/2 fatigue/weakness. Recommending SNF placement.

## 2022-07-21 NOTE — SUBJECTIVE & OBJECTIVE
Interval History: reports episode of melena at home following recent discharge, but no lightheadedness, dizziness or ongoing diarrhea. Reports she has been very weak since her discharge and her daughters have had difficulty taking care of her at home. Request placement.    Review of Systems   Constitutional:  Positive for fatigue. Negative for fever.   Respiratory:  Negative for shortness of breath.    Cardiovascular:  Positive for leg swelling. Negative for chest pain.   Gastrointestinal:  Positive for blood in stool.   Neurological:  Positive for weakness and light-headedness.   Objective:     Vital Signs (Most Recent):  Temp: 97.8 °F (36.6 °C) (07/21/22 0758)  Pulse: 63 (07/21/22 1132)  Resp: 19 (07/21/22 1132)  BP: (!) 177/74 (07/21/22 1132)  SpO2: 98 % (07/21/22 1132)   Vital Signs (24h Range):  Temp:  [94.3 °F (34.6 °C)-98.3 °F (36.8 °C)] 97.8 °F (36.6 °C)  Pulse:  [54-70] 63  Resp:  [16-22] 19  SpO2:  [97 %-100 %] 98 %  BP: (131-177)/(58-96) 177/74     Weight: 127.2 kg (280 lb 6.8 oz)  Body mass index is 43.92 kg/m².    Intake/Output Summary (Last 24 hours) at 7/21/2022 1313  Last data filed at 7/21/2022 0628  Gross per 24 hour   Intake --   Output 450 ml   Net -450 ml      Physical Exam  Vitals and nursing note reviewed.   Constitutional:       General: She is not in acute distress.     Appearance: Normal appearance. She is obese. She is not ill-appearing or diaphoretic.   HENT:      Head: Normocephalic and atraumatic.      Mouth/Throat:      Mouth: Mucous membranes are dry.   Eyes:      Extraocular Movements: Extraocular movements intact.      Pupils: Pupils are equal, round, and reactive to light.   Cardiovascular:      Rate and Rhythm: Normal rate and regular rhythm.      Pulses: Normal pulses.      Heart sounds: Normal heart sounds.   Pulmonary:      Effort: Pulmonary effort is normal. No respiratory distress.      Breath sounds: Normal breath sounds.   Abdominal:      General: Bowel sounds are normal.  There is no distension.      Palpations: Abdomen is soft.      Tenderness: There is no abdominal tenderness. There is no guarding.   Musculoskeletal:         General: No swelling. Normal range of motion.      Cervical back: Normal range of motion.      Right lower leg: Edema present.      Left lower leg: Edema present.   Skin:     General: Skin is warm.      Capillary Refill: Capillary refill takes 2 to 3 seconds.   Neurological:      General: No focal deficit present.      Mental Status: She is alert. Mental status is at baseline.   Psychiatric:         Mood and Affect: Mood normal.         Behavior: Behavior normal.         Thought Content: Thought content normal.       Significant Labs: All pertinent labs within the past 24 hours have been reviewed.    Significant Imaging: I have reviewed all pertinent imaging results/findings within the past 24 hours.

## 2022-07-21 NOTE — PLAN OF CARE
Patient Bp elevated, asymptomatic. Dr Viera informed and put in order for lisinopril. TB test done on left forearm. On Purewic, BM noted today. Call light within reach. Bed alarm on.

## 2022-07-22 VITALS
RESPIRATION RATE: 18 BRPM | OXYGEN SATURATION: 100 % | DIASTOLIC BLOOD PRESSURE: 70 MMHG | HEART RATE: 66 BPM | HEIGHT: 67 IN | BODY MASS INDEX: 44.02 KG/M2 | WEIGHT: 280.44 LBS | TEMPERATURE: 97 F | SYSTOLIC BLOOD PRESSURE: 161 MMHG

## 2022-07-22 LAB
ANION GAP SERPL CALC-SCNC: 6 MMOL/L (ref 8–16)
BASOPHILS # BLD AUTO: 0.03 K/UL (ref 0–0.2)
BASOPHILS NFR BLD: 0.7 % (ref 0–1.9)
BUN SERPL-MCNC: 24 MG/DL (ref 8–23)
CALCIUM SERPL-MCNC: 8.1 MG/DL (ref 8.7–10.5)
CHLORIDE SERPL-SCNC: 114 MMOL/L (ref 95–110)
CO2 SERPL-SCNC: 22 MMOL/L (ref 23–29)
CREAT SERPL-MCNC: 1.2 MG/DL (ref 0.5–1.4)
DIFFERENTIAL METHOD: ABNORMAL
EOSINOPHIL # BLD AUTO: 0.1 K/UL (ref 0–0.5)
EOSINOPHIL NFR BLD: 1.7 % (ref 0–8)
ERYTHROCYTE [DISTWIDTH] IN BLOOD BY AUTOMATED COUNT: 15.9 % (ref 11.5–14.5)
EST. GFR  (AFRICAN AMERICAN): 50 ML/MIN/1.73 M^2
EST. GFR  (NON AFRICAN AMERICAN): 43 ML/MIN/1.73 M^2
GLUCOSE SERPL-MCNC: 82 MG/DL (ref 70–110)
HCT VFR BLD AUTO: 24 % (ref 37–48.5)
HCT VFR BLD AUTO: 24.3 % (ref 37–48.5)
HGB BLD-MCNC: 7.3 G/DL (ref 12–16)
HGB BLD-MCNC: 7.8 G/DL (ref 12–16)
IMM GRANULOCYTES # BLD AUTO: 0.02 K/UL (ref 0–0.04)
IMM GRANULOCYTES NFR BLD AUTO: 0.5 % (ref 0–0.5)
LYMPHOCYTES # BLD AUTO: 1 K/UL (ref 1–4.8)
LYMPHOCYTES NFR BLD: 23.7 % (ref 18–48)
MCH RBC QN AUTO: 28.4 PG (ref 27–31)
MCHC RBC AUTO-ENTMCNC: 30.4 G/DL (ref 32–36)
MCV RBC AUTO: 93 FL (ref 82–98)
MONOCYTES # BLD AUTO: 0.4 K/UL (ref 0.3–1)
MONOCYTES NFR BLD: 8.9 % (ref 4–15)
NEUTROPHILS # BLD AUTO: 2.7 K/UL (ref 1.8–7.7)
NEUTROPHILS NFR BLD: 64.5 % (ref 38–73)
NRBC BLD-RTO: 0 /100 WBC
PLATELET # BLD AUTO: 189 K/UL (ref 150–450)
PMV BLD AUTO: 10 FL (ref 9.2–12.9)
POCT GLUCOSE: 105 MG/DL (ref 70–110)
POCT GLUCOSE: 118 MG/DL (ref 70–110)
POTASSIUM SERPL-SCNC: 4.1 MMOL/L (ref 3.5–5.1)
RBC # BLD AUTO: 2.57 M/UL (ref 4–5.4)
SODIUM SERPL-SCNC: 142 MMOL/L (ref 136–145)
WBC # BLD AUTO: 4.14 K/UL (ref 3.9–12.7)

## 2022-07-22 PROCEDURE — 36415 COLL VENOUS BLD VENIPUNCTURE: CPT | Performed by: INTERNAL MEDICINE

## 2022-07-22 PROCEDURE — 94761 N-INVAS EAR/PLS OXIMETRY MLT: CPT

## 2022-07-22 PROCEDURE — 25000003 PHARM REV CODE 250: Performed by: HOSPITALIST

## 2022-07-22 PROCEDURE — 85025 COMPLETE CBC W/AUTO DIFF WBC: CPT | Performed by: INTERNAL MEDICINE

## 2022-07-22 PROCEDURE — G0378 HOSPITAL OBSERVATION PER HR: HCPCS

## 2022-07-22 PROCEDURE — 80048 BASIC METABOLIC PNL TOTAL CA: CPT | Performed by: INTERNAL MEDICINE

## 2022-07-22 RX ORDER — LISINOPRIL 5 MG/1
5 TABLET ORAL DAILY
Qty: 90 TABLET | Refills: 3 | Status: SHIPPED | OUTPATIENT
Start: 2022-07-22 | End: 2022-10-05 | Stop reason: SDUPTHER

## 2022-07-22 RX ORDER — FERROUS SULFATE 325(65) MG
325 TABLET, DELAYED RELEASE (ENTERIC COATED) ORAL DAILY
Refills: 0
Start: 2022-07-22 | End: 2022-12-14

## 2022-07-22 RX ORDER — LANOLIN ALCOHOL/MO/W.PET/CERES
1 CREAM (GRAM) TOPICAL DAILY
Status: DISCONTINUED | OUTPATIENT
Start: 2022-07-22 | End: 2022-07-22 | Stop reason: HOSPADM

## 2022-07-22 RX ADMIN — LISINOPRIL 5 MG: 5 TABLET ORAL at 08:07

## 2022-07-22 RX ADMIN — PANTOPRAZOLE SODIUM 40 MG: 40 TABLET, DELAYED RELEASE ORAL at 08:07

## 2022-07-22 RX ADMIN — FERROUS SULFATE TAB 325 MG (65 MG ELEMENTAL FE) 1 EACH: 325 (65 FE) TAB at 08:07

## 2022-07-22 NOTE — PLAN OF CARE
At time of discharge pt will be transported via [facility] to [facility]. Pt has placement packet at bedside. Pt  daughter have been contacted to inform of transport. Pharmacist will go over home medications and reasons for medications. VN and bedside nurse to reiterate final discharge instructions.         07/22/22 1350   Final Note   Assessment Type Final Discharge Note   Anticipated Discharge Disposition Home   What phone number can be called within the next 1-3 days to see how you are doing after discharge? [phone]   Hospital Resources/Appts/Education Provided Appointments scheduled and added to AVS   Post-Acute Status   Post-Acute Authorization Placement   Post-Acute Placement Status Set-up Complete/Auth obtained   Discharge Delays None known at this time     Future Appointments   Date Time Provider Department Center   7/25/2022  1:00 PM Adrienne Chisholm MD [facility] IMPRI [place] [facility]   8/11/2022  2:00 PM Gretta Perry DO [facility] WOUND [place] [facility]   8/19/2022  8:05 AM LAB, [facility] [place]   8/19/2022  8:30 AM SPECIMEN, [facility] SPECLAB [place]   8/23/2022  1:30 PM Katie Knott MD [facility] [place]   10/5/2022 10:30 AM Deedee Calderon MD [facility] IM [place]   10/11/2022  1:15 PM Marlin Adams DPM [facility] POD [place] Case Management  [phone]

## 2022-07-22 NOTE — PLAN OF CARE
Pt is confirmed to be placed at [facility].SW has confirmed pt to be placed in room 3b. Sw to inform Nurse to call report. SW to set up transport. SW to call pt daughter to inform of placement confirmation and time of transport.        07/22/22 1341   Post-Acute Status   Post-Acute Authorization Placement   Post-Acute Placement Status Set-up Complete/Auth obtained   Hospital Resources/Appts/Education Provided Appointments scheduled and added to AVS   Discharge Delays None known at this time   Discharge Plan   Discharge Plan A Skilled Nursing Facility     Future Appointments   Date Time Provider Department Center   7/25/2022  1:00 PM Adrienne Chisholm MD [facility]PRI [place] [facility]   8/11/2022  2:00 PM Gretta Perry DO [facility] WOUND [place] [facility]   8/19/2022  8:05 AM LAB, [facility] [place]   8/19/2022  8:30 AM SPECIMEN, [facility] SPECLAB [place]   8/23/2022  1:30 PM Katie Knott MD [facility]   10/5/2022 10:30 AM Deedee Calderon MD [facility] ULISES [place] [facility]   10/11/2022  1:15 PM Marlin Adams DPM [facility] JOSTIN [place] Case Management  [phone]/

## 2022-07-22 NOTE — PROGRESS NOTES
Patient AAOX4, VSS, BS monitored Report given to Luz at [facility] Preparing for d/c

## 2022-07-22 NOTE — HOSPITAL COURSE
"Ms. Ortiz presented with a concern for recurrent GI bleeding due to episode of melena at home; suspect this is old blood as she has not had further episodes while in house in her hemoglobin has remained stable.  Of note, her clinical course was complicated by severe weakness and family was having difficulty managing her at home.  Assessed by PT/OT while in house and recommended SNF placement.  Will discharge to SNF for rehabilitation.    BP (!) 161/70 (BP Location: Left arm, Patient Position: Lying)   Pulse 66   Temp 96.7 °F (35.9 °C) (Oral)   Resp 18   Ht 5' 7" (1.702 m)   Wt 127.2 kg (280 lb 6.8 oz)   LMP  (LMP Unknown) Comment: patient refused to weigh/wounds to legs and toes   SpO2 100%   BMI 43.92 kg/m²   Physical Exam  Vitals and nursing note reviewed.   Constitutional:       General: She is not in acute distress.     Appearance: Normal appearance. She is obese. She is not ill-appearing or diaphoretic.   HENT:      Head: Normocephalic and atraumatic.      Mouth/Throat:      Mouth: Mucous membranes are dry.   Eyes:      Extraocular Movements: Extraocular movements intact.      Pupils: Pupils are equal, round, and reactive to light.   Cardiovascular:      Rate and Rhythm: Normal rate and regular rhythm.      Pulses: Normal pulses.      Heart sounds: Normal heart sounds.   Pulmonary:      Effort: Pulmonary effort is normal. No respiratory distress.      Breath sounds: Normal breath sounds.   Abdominal:      General: Bowel sounds are normal. There is no distension.      Palpations: Abdomen is soft.      Tenderness: There is no abdominal tenderness. There is no guarding.   Musculoskeletal:         General: No swelling. Normal range of motion.      Cervical back: Normal range of motion.      Right lower leg: Edema present.      Left lower leg: Edema present.   Skin:     General: Skin is warm.      Capillary Refill: Capillary refill takes 2 to 3 seconds.   Neurological:      General: No focal deficit " present.      Mental Status: She is alert. Mental status is at baseline.   Psychiatric:         Mood and Affect: Mood normal.         Behavior: Behavior normal.         Thought Content: Thought content normal.

## 2022-07-22 NOTE — PROGRESS NOTES
"[facility]                    Pharmacy       Discharge Medication Education    Patient ACCEPTED medication education. Pharmacy has provided education on the name, indication, and possible side effects of the medication(s) prescribed, using teach-back method.     The following medications have also been discussed, during this admission.        Medication List        START taking these medications      ferrous sulfate 325 (65 FE) MG EC tablet  Take 1 tablet (325 mg total) by mouth once daily.     insulin detemir U-100 100 unit/mL (3 mL) Inpn pen  Commonly known as: Levemir FLEXTOUCH  Inject 10 Units into the skin every evening.  Replaces: LANTUS U-100 INSULIN 100 unit/mL injection     lisinopriL 5 MG tablet  Commonly known as: PRINIVIL,ZESTRIL  Take 1 tablet (5 mg total) by mouth once daily.            CONTINUE taking these medications      atorvastatin 40 MG tablet  Commonly known as: LIPITOR  Take 1 tablet (40 mg total) by mouth every evening.     SHANNAN CHEWABLE ASPIRIN 81 MG Chew  Generic drug: aspirin  Chew 1 tablet (81 mg total) by mouth once daily.     pantoprazole 40 MG tablet  Commonly known as: PROTONIX  Take 1 tablet (40 mg total) by mouth once daily.     VITAMIN C 500 MG tablet  Generic drug: ascorbic acid (vitamin C)            STOP taking these medications      BD INSULIN SYRINGE ULTRA-FINE 0.5 mL 30 gauge x 1/2" Syrg  Generic drug: insulin syringe-needle U-100     blood sugar diagnostic Strp  Commonly known as: TRUE METRIX GLUCOSE TEST STRIP     DROPLET INSULIN SYR(HALF UNIT) 0.5 mL 30 gauge x 1/2" Syrg  Generic drug: insulin syr/ndl U100 half yesenia     lancets 33 gauge Misc  Commonly known as: TRUEPLUS LANCETS     LANTUS U-100 INSULIN 100 unit/mL injection  Generic drug: insulin glargine  Replaced by: insulin detemir U-100 100 unit/mL (3 mL) Inpn pen     TRUE METRIX LEVEL 3 Soln  Generic drug: blood glucose control, high               Where to Get Your Medications        These " medications were sent to [id number] - [address] [facility] [address], [place] [address]      Phone: [phone]   lisinopriL 5 MG tablet       Information about where to get these medications is not yet available    Ask your nurse or doctor about these medications  ferrous sulfate 325 (65 FE) MG EC tablet  insulin detemir U-100 100 unit/mL (3 mL) Inpn pen          Thank you  Prais Ramon, PharmD  [phone]

## 2022-07-22 NOTE — PLAN OF CARE
Discharge orders noted. AVS prepared with medication list, importance of medication compliance, follow up appointments, diet, home care instructions, treatment plan, self management, and when to seek medical attention. Detailed clinical reference list attached. AVS will be  printed by bedside nurse Carmel and placed in discharge packet for accepting facility once cleared by .

## 2022-07-22 NOTE — PLAN OF CARE
Awaiting insurance auth from [facility] for final acceptance to [facility].  Facility has contacted family to sign admission paperwork.  Admission staff will contact CM staff once all systems a-go.      Urszula Law RN [facility]  Supervisor Case Management-[place]  [phone]

## 2022-07-22 NOTE — PLAN OF CARE
[facility]    FACILITY TRANSFER ORDERS      Patient Name: Sherin Ortiz  YOB: 1945    PCP: Deedee Calderon MD   PCP Address: [address] [place] [address]  PCP Phone Number: [phone]  PCP Fax: [phone]    Encounter Date: 07/22/2022    Admit to: SNF    Vital Signs:  Routine    Diagnoses:   Active Hospital Problems    Diagnosis  POA    *GIB (gastrointestinal bleeding) [K92.2]  Yes    ACP (advance care planning) [Z71.89]  Not Applicable    Type 2 diabetes mellitus with diabetic polyneuropathy, with long-term current use of insulin [E11.42, Z79.4]  Not Applicable     Chronic    Stage 4 chronic kidney disease [N18.4]  Yes    Debility [R53.81]  Yes     Chronic    Essential hypertension [I10]  Yes     Chronic    Hyperlipidemia LDL goal <100 [E78.5]  Yes     Chronic      Resolved Hospital Problems   No resolved problems to display.       Allergies:  Review of patient's allergies indicates:   Allergen Reactions    Penicillins Hives     Other reaction(s): Hives  Patient has received cefdinir, ceftriaxone, cefazolin and cefepime in the past with no documented reactions    Sulfa (sulfonamide antibiotics) Other (See Comments)     Eyad, pt states her doctor told her the shakes were possibly caused by an allergy to sulfa       Diet: diabetic diet: 2000 calorie    Activities: Activity as tolerated    Goals of Care Treatment Preferences:  Code Status: DNR      Nursing: per facility protocol     Labs: per facility protocol; check CBC and BMP in 1 week     CONSULTS:    Physical Therapy to evaluate and treat. , Occupational Therapy to evaluate and treat. and  to evaluate for community resources/long-range planning.    MISCELLANEOUS CARE:  Routine Skin for Bedridden Patients: Apply moisture barrier cream to all skin folds and wet areas in perineal area daily and after baths and all bowel movements. and Diabetes Care:   SN to perform and educate Diabetic  "management with blood glucose monitoring:, Fingerstick blood sugar AC and HS and Report CBG < 60 or > 350 to physician.    WOUND CARE ORDERS  None    Medications: Review discharge medications with patient and family and provide education.      Current Discharge Medication List      START taking these medications    Details   ferrous sulfate 325 (65 FE) MG EC tablet Take 1 tablet (325 mg total) by mouth once daily.  Refills: 0      insulin detemir U-100 (LEVEMIR FLEXTOUCH) 100 unit/mL (3 mL) SubQ InPn pen Inject 10 Units into the skin every evening.  Refills: 3      lisinopriL (PRINIVIL,ZESTRIL) 5 MG tablet Take 1 tablet (5 mg total) by mouth once daily.  Qty: 90 tablet, Refills: 3    Comments: .         CONTINUE these medications which have NOT CHANGED    Details   ascorbic acid, vitamin C, (VITAMIN C) 500 MG tablet Take 500 mg by mouth once daily.      aspirin 81 MG Chew Chew 1 tablet (81 mg total) by mouth once daily.  Qty: 30 tablet, Refills: 5      atorvastatin (LIPITOR) 40 MG tablet Take 1 tablet (40 mg total) by mouth every evening.  Qty: 90 tablet, Refills: 3    Associated Diagnoses: Hyperlipidemia LDL goal <100      pantoprazole (PROTONIX) 40 MG tablet Take 1 tablet (40 mg total) by mouth once daily.  Qty: 30 tablet, Refills: 11         STOP taking these medications       insulin glargine (LANTUS U-100 INSULIN) 100 unit/mL injection Comments:   Reason for Stopping:         BD INSULIN SYRINGE ULTRA-FINE 0.5 mL 30 gauge x 1/2" Syrg Comments:   Reason for Stopping:         blood glucose control, high (TRUE METRIX LEVEL 3) Soln Comments:   Reason for Stopping:         blood sugar diagnostic (TRUE METRIX GLUCOSE TEST STRIP) Strp Comments:   Reason for Stopping:         insulin syr/ndl U100 half yesenia (DROPLET INSULIN SYR HALF UNIT) 0.5 mL 30 gauge x 1/2" Syrg Comments:   Reason for Stopping:         lancets (TRUEPLUS LANCETS) 33 gauge Misc Comments:   Reason for Stopping:                  Immunizations " Administered as of 7/22/2022     No immunizations on file.            _________________________________  Derrick Viera MD  07/22/2022

## 2022-07-22 NOTE — DISCHARGE SUMMARY
"[facility] Medicine  Discharge Summary      Patient Name: Sherin Ortiz  MRN: [id number]  Patient Class: OP- Observation  Admission Date: 7/20/2022  Hospital Length of Stay: 0 days  Discharge Date and Time: 7/22/2022  2:54 PM  Attending Physician: No att. providers found   Discharging Provider: Derrick Viera MD  Primary Care Provider: Deedee Calderon MD      HPI:   77F recent admission for GIB. She underwent EGD which revealed actively bleeding Dieulafoy lesion in the proximal jejunum - ablated with APC, hemoclips then marked with Celia Ink. She was DC to home unfortunately she had increasing weakness and said she continued to have melanotic stools. Patient complains of difficulty transferring due to weakness in legs. Patient states that she did not ambulate in hospital at all. She was occult positive in the ER and the ER MD talked to GI who plans on repeat endoscopy in AM. [facility] consulted for admission. LUIS ARMANDO ER MD.       * No surgery found *      Hospital Course:   Ms. Ortiz presented with a concern for recurrent GI bleeding due to episode of melena at home; suspect this is old blood as she has not had further episodes while in house in her hemoglobin has remained stable.  Of note, her clinical course was complicated by severe weakness and family was having difficulty managing her at home.  Assessed by PT/OT while in house and recommended SNF placement.  Will discharge to SNF for rehabilitation.    BP (!) 161/70 (BP Location: Left arm, Patient Position: Lying)   Pulse 66   Temp 96.7 °F (35.9 °C) (Oral)   Resp 18   Ht 5' 7" (1.702 m)   Wt 127.2 kg (280 lb 6.8 oz)   LMP  (LMP Unknown) Comment: patient refused to weigh/wounds to legs and toes   SpO2 100%   BMI 43.92 kg/m²   Physical Exam  Vitals and nursing note reviewed.   Constitutional:       General: She is not in acute distress.     Appearance: Normal appearance. She is obese. She is not ill-appearing or diaphoretic.   HENT:    "   Head: Normocephalic and atraumatic.      Mouth/Throat:      Mouth: Mucous membranes are dry.   Eyes:      Extraocular Movements: Extraocular movements intact.      Pupils: Pupils are equal, round, and reactive to light.   Cardiovascular:      Rate and Rhythm: Normal rate and regular rhythm.      Pulses: Normal pulses.      Heart sounds: Normal heart sounds.   Pulmonary:      Effort: Pulmonary effort is normal. No respiratory distress.      Breath sounds: Normal breath sounds.   Abdominal:      General: Bowel sounds are normal. There is no distension.      Palpations: Abdomen is soft.      Tenderness: There is no abdominal tenderness. There is no guarding.   Musculoskeletal:         General: No swelling. Normal range of motion.      Cervical back: Normal range of motion.      Right lower leg: Edema present.      Left lower leg: Edema present.   Skin:     General: Skin is warm.      Capillary Refill: Capillary refill takes 2 to 3 seconds.   Neurological:      General: No focal deficit present.      Mental Status: She is alert. Mental status is at baseline.   Psychiatric:         Mood and Affect: Mood normal.         Behavior: Behavior normal.         Thought Content: Thought content normal.        Goals of Care Treatment Preferences:  Code Status: DNR      Consults:   Consults (From admission, onward)        Status Ordering Provider     Inpatient consult to Gastroenterology  Once        Provider:  (Not yet assigned)    Completed SHARON BERRY          No new Assessment & Plan notes have been filed under this hospital service since the last note was generated.  Service: Hospital Medicine    Final Active Diagnoses:    Diagnosis Date Noted POA    PRINCIPAL PROBLEM:  GIB (gastrointestinal bleeding) [K92.2] 07/13/2022 Yes    ACP (advance care planning) [Z71.89] 07/20/2022 Not Applicable    Type 2 diabetes mellitus with diabetic polyneuropathy, with long-term current use of insulin [E11.42, Z79.4] 09/18/2015  "Not Applicable     Chronic    Stage 4 chronic kidney disease [N18.4] 09/12/2013 Yes    Debility [R53.81] 12/19/2012 Yes     Chronic    Essential hypertension [I10] 11/14/2012 Yes     Chronic    Hyperlipidemia LDL goal <100 [E78.5] 08/14/2012 Yes     Chronic      Problems Resolved During this Admission:       Discharged Condition: fair    Disposition: Skilled Nursing Facility    Follow Up:    Patient Instructions:   No discharge procedures on file.    Significant Diagnostic Studies:  See above    Pending Diagnostic Studies:     None         Medications:  Reconciled Home Medications:      Medication List      START taking these medications    ferrous sulfate 325 (65 FE) MG EC tablet  Take 1 tablet (325 mg total) by mouth once daily.     insulin detemir U-100 100 unit/mL (3 mL) Inpn pen  Commonly known as: Levemir FLEXTOUCH  Inject 10 Units into the skin every evening.  Replaces: LANTUS U-100 INSULIN 100 unit/mL injection     lisinopriL 5 MG tablet  Commonly known as: PRINIVIL,ZESTRIL  Take 1 tablet (5 mg total) by mouth once daily.        CONTINUE taking these medications    atorvastatin 40 MG tablet  Commonly known as: LIPITOR  Take 1 tablet (40 mg total) by mouth every evening.     SHANNAN CHEWABLE ASPIRIN 81 MG Chew  Generic drug: aspirin  Chew 1 tablet (81 mg total) by mouth once daily.     pantoprazole 40 MG tablet  Commonly known as: PROTONIX  Take 1 tablet (40 mg total) by mouth once daily.     VITAMIN C 500 MG tablet  Generic drug: ascorbic acid (vitamin C)  Take 500 mg by mouth once daily.        STOP taking these medications    BD INSULIN SYRINGE ULTRA-FINE 0.5 mL 30 gauge x 1/2" Syrg  Generic drug: insulin syringe-needle U-100     blood sugar diagnostic Strp  Commonly known as: TRUE METRIX GLUCOSE TEST STRIP     DROPLET INSULIN SYR(HALF UNIT) 0.5 mL 30 gauge x 1/2" Syrg  Generic drug: insulin syr/ndl U100 half yesenia     lancets 33 gauge Misc  Commonly known as: TRUEPLUS LANCETS     LANTUS U-100 INSULIN 100 " unit/mL injection  Generic drug: insulin glargine  Replaced by: insulin detemir U-100 100 unit/mL (3 mL) Inpn pen     TRUE METRIX LEVEL 3 Soln  Generic drug: blood glucose control, high            Indwelling Lines/Drains at time of discharge:   Lines/Drains/Airways     Drain  Duration           Female External Urinary Catheter 07/21/22 0700 1 day                Time spent on the discharge of patient: 35 minutes         Derrick Viera MD  Department of Hospital Medicine  [facility]

## 2022-07-24 NOTE — HOSPITAL COURSE
Ms. Ortiz presented with weakness and melena.  Admitted for GI bleed with symptomatic anemia.  Hemoglobin 6.7 on admission and she was transfused 1 unit packed red blood cells.  Treatment initiated with IV PPI, 2 large bore peripheral IVs in place and anti-platelets and anticoagulation held.  Monitored with serial CBCs.  GI consulted, and patient underwent EGD which showed patent Schatzki ring and gastritis but no active source of bleeding on 07/13/2022. Colonoscopy showed melena throughout the colon but no bleeding source identified on 7/14/2022. Further workup with cap capsule endoscopy showed active bleeding proximal small bowel on 7/15/2022. She underwent push enteroscopy on 7/18/2022 which was remarkable for dieulafoy lesion that was treated with APC and clips. She had no further bleeding and H/H was stable (Hgb 7.7 on day of discharge). She was arranged for home health with follow up with PCP and GI.

## 2022-07-24 NOTE — DISCHARGE SUMMARY
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), Benign essential hypertension (11/14/2012), Cataract, Chronic kidney disease (CKD), stage III (moderate) (9/12/2013), Diabetic peripheral neuropathy associated with type 2 diabetes mellitus (11/14/2014), Gait disorder, Hyperlipidemia, Iritis - Both Eyes (6/10/2013), Kidney stone, Lymphedema, Morbid obesity with BMI of 40.0-44.9, adult (2/18/2015), Nephrolithiasis (4/20/2016), NS (nuclear sclerosis) (4/1/2013), Nuclear sclerosis - Both Eyes (4/29/2013), Preseptal cellulitis - Right Eye (4/29/2013), Proliferative diabetic retinopathy - Both Eyes (4/29/2013), Proliferative diabetic retinopathy, both eyes (4/1/2013), PSC (posterior subcapsular cataract) - Both Eyes (4/29/2013), S/P hernia repair (12/19/2012), TIA (transient ischemic attack) (11/18/2014), Tinea pedis (7/24/2012), Type 2 diabetes mellitus with diabetic polyneuropathy, with long-term current use of insulin (9/18/2015), Type 2 diabetes mellitus with renal manifestations, controlled (12/12/2013), Type 2 diabetes, controlled, with moderate nonproliferative diabetic retinopathy without macular edema (9/17/2015), Ulcer of left lower extremity, limited to breakdown of skin (7/8/2015), Unspecified cerebral artery occlusion with cerebral infarction (11/16/2014), Unspecified venous (peripheral) insufficiency, Ureteral stone with hydronephrosis (1/27/2016), UTI (lower urinary tract infection), Vaginal infection, and 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    She  "presents today due to weakness and black stools. Patient reports having black stools since 07/22. She had a large bowel movement today and since then her stomach has been feeling "bloated". She reports significant weakness with this as well. She denies history of bleeding from the stomach in the past shortness of breath fevers chest pain or new pains today. Patient states that she underwent surgery for veins on her legs on 06/30/2022. She reports taking a baby aspirin and Plavix at home. Denies SOB, CP, nausea, vomiting, or abdominal pain.    In the ED: patient was occult blood positive, Hgb 6.7, CXR without acute processes. Hemodynamically stable otherwise. Admitted to [facility] for further evaluation and GI work up.      Procedure(s) (LRB):  ENTEROSCOPY Upper SBE (N/A)      Hospital Course:   Ms. Ortiz presented with weakness and melena.  Admitted for GI bleed with symptomatic anemia.  Hemoglobin 6.7 on admission and she was transfused 1 unit packed red blood cells.  Treatment initiated with IV PPI, 2 large bore peripheral IVs in place and anti-platelets and anticoagulation held.  Monitored with serial CBCs.  GI consulted, and patient underwent EGD which showed patent Schatzki ring and gastritis but no active source of bleeding on 07/13/2022. Colonoscopy showed melena throughout the colon but no bleeding source identified on 7/14/2022. Further workup with cap capsule endoscopy showed active bleeding proximal small bowel on 7/15/2022. She underwent push enteroscopy on 7/18/2022 which was remarkable for dieulafoy lesion that was treated with APC and clips. She had no further bleeding and H/H was stable (Hgb 7.7 on day of discharge). She was arranged for home health with follow up with PCP and GI.       Goals of Care Treatment Preferences:  Code Status: DNR      Consults:   Consults (From admission, onward)        Status Ordering Provider     Inpatient consult to Registered Dietitian/Nutritionist  " Once        Provider:  (Not yet assigned)    Completed FELICITY SEALS     Inpatient consult to Gastroenterology  Once        Provider:  (Not yet assigned)    Completed TYSON SALAZAR        Service: Hospital Medicine    Final Active Diagnoses:    Diagnosis Date Noted POA    PRINCIPAL PROBLEM:  GIB (gastrointestinal bleeding) [K92.2] 07/13/2022 Yes    Obesity, morbid (more than 100 lbs over ideal weight or BMI > 40) [E66.01]  Yes    (HFpEF) heart failure with preserved ejection fraction [I50.30] 01/22/2018 Yes    PAOD (peripheral arterial occlusive disease) [I77.9] 07/06/2017 Yes    Wheelchair dependent [Z99.3] 11/29/2015 Not Applicable     Chronic    Type 2 diabetes mellitus with diabetic polyneuropathy, with long-term current use of insulin [E11.42, Z79.4] 09/18/2015 Not Applicable     Chronic    Anemia of chronic disease [D63.8] 09/18/2015 Yes     Chronic    Stage 4 chronic kidney disease [N18.4] 09/12/2013 Yes    Essential hypertension [I10] 11/14/2012 Yes     Chronic    Hyperlipidemia LDL goal <100 [E78.5] 08/14/2012 Yes     Chronic      Problems Resolved During this Admission:       Discharged Condition: good    Disposition: Home or Self Care    Follow Up:   Follow-up Information     [facility] Follow up.    Why: Please review [facility] for future follow up appointments.           [facility] Follow up.    Why: [facility] agency will contact patient to resume care and schedule appointment time/date.                     Patient Instructions:      Ambulatory referral/consult to Wound Clinic   Standing Status: Future   Referral Priority: Routine Referral Type: Consultation   Referral Reason: Specialty Services Required   Requested Specialty: Wound Care   Number of Visits Requested: 1     Diet diabetic     Diet Cardiac     Diet diabetic     Diet Cardiac     Activity as tolerated       Significant Diagnostic Studies:     Pending Diagnostic Studies:     Procedure Component Value Units  "Date/Time    COVID-19 Routine Screening [[id number]] Collected: 07/18/22 1004    Order Status: Sent Lab Status: In process Updated: 07/18/22 1005    Specimen: Nasopharyngeal          Medications:  Reconciled Home Medications:      Medication List      START taking these medications    pantoprazole 40 MG tablet  Commonly known as: PROTONIX  Take 1 tablet (40 mg total) by mouth once daily.        CONTINUE taking these medications    atorvastatin 40 MG tablet  Commonly known as: LIPITOR  Take 1 tablet (40 mg total) by mouth every evening.     SHANNAN CHEWABLE ASPIRIN 81 MG Chew  Generic drug: aspirin  Chew 1 tablet (81 mg total) by mouth once daily.        STOP taking these medications    acetaminophen 500 MG tablet  Commonly known as: TYLENOL     BD INSULIN SYRINGE ULTRA-FINE 0.5 mL 30 gauge x 1/2" Syrg  Generic drug: insulin syringe-needle U-100     blood sugar diagnostic Strp  Commonly known as: TRUE METRIX GLUCOSE TEST STRIP     doxycycline 100 MG Cap  Commonly known as: VIBRAMYCIN     DROPLET INSULIN SYR(HALF UNIT) 0.5 mL 30 gauge x 1/2" Syrg  Generic drug: insulin syr/ndl U100 half yesenia     lancets 33 gauge Misc  Commonly known as: TRUEPLUS LANCETS     LANTUS U-100 INSULIN 100 unit/mL injection  Generic drug: insulin glargine     TRUE METRIX LEVEL 3 Soln  Generic drug: blood glucose control, high            Indwelling Lines/Drains at time of discharge:   Lines/Drains/Airways     None                 Time spent on the discharge of patient: 35 minutes         Viky Schwartz MD  Department of Hospital Medicine  [facility], [place]  "

## 2022-07-26 ENCOUNTER — TELEPHONE (OUTPATIENT)
Dept: INTERNAL MEDICINE | Facility: CLINIC | Age: 77
End: 2022-07-26
Payer: MEDICARE

## 2022-07-26 NOTE — TELEPHONE ENCOUNTER
"----- Message from Deedee Calderon MD sent at 7/22/2022  6:57 PM CDT -----  Regarding: RE: rehab vs Home health  Please follow up with pt, see if HH services are in place. Thank you.   ----- Message -----  From: Ana Jones LPN  Sent: 7/20/2022  11:37 AM CDT  To: Deedee Calderon MD  Subject: FW: rehab vs Home health                           ----- Message -----  From: Diane Rasmussen RN  Sent: 7/20/2022  11:31 AM CDT  To: [facility]  Subject: rehab vs Home health                             Please forward this important TCC information to your provider in order to maximize the post discharge care delivery of this patient.    C3 nurse spoke with Sherin Ortiz and her daughter, Hayley, for a TCC post hospital discharge follow up call. The patient was discharged yesterday from [facility] - post GI bleed.  Patient states today that she is having trouble transferring/weakness.  Triage call initiated - daughter Hayley states that "she is always like this for 5-6 days after being in the hospital."  Triage was then stopped.    Patient's daughter states that the patient usually goes to Rehab after hospital stay. Patient does have orders for [facility] resumption.  C3 nurse spoke to [facility] intake and they will be seeing patient tomorrow and will call patient to let her know, today.  This message is to inform from information during post discharge call.  Please reach out to patient/daughter directly too discuss this with them.    Respectfully,  Diane Rasmussen RN  [facility]    [email or web address]       Please do not reply to this message, as this inbox is not routinely monitored.         "

## 2022-07-26 NOTE — TELEPHONE ENCOUNTER
"----- Message from Deedee Calderon MD sent at 7/22/2022  6:57 PM CDT -----  Regarding: RE: rehab vs Home health  Please follow up with pt, see if HH services are in place. Thank you.   ----- Message -----  From: Ana Jones LPN  Sent: 7/20/2022  11:37 AM CDT  To: Deedee Calderon MD  Subject: FW: rehab vs Home health                           ----- Message -----  From: Diane Rasmussen RN  Sent: 7/20/2022  11:31 AM CDT  To: [facility] Staff  Subject: rehab vs Home health                             Please forward this important TCC information to your provider in order to maximize the post discharge care delivery of this patient.    C3 nurse spoke with Sherin Ortiz and her daughter, Hayley, for a TCC post hospital discharge follow up call. The patient was discharged yesterday from [facility] - post GI bleed.  Patient states today that she is having trouble transferring/weakness.  Triage call initiated - daughter Hayley states that "she is always like this for 5-6 days after being in the hospital."  Triage was then stopped.    Patient's daughter states that the patient usually goes to Rehab after hospital stay. Patient does have orders for [facility] resumption.  C3 nurse spoke to [facility] intake and they will be seeing patient tomorrow and will call patient to let her know, today.  This message is to inform from information during post discharge call.  Please reach out to patient/daughter directly too discuss this with them.    Respectfully,  Diane Rasmussen RN  [facility]    [email or web address]       Please do not reply to this message, as this inbox is not routinely monitored.         "

## 2022-07-26 NOTE — TELEPHONE ENCOUNTER
"----- Message from Deedee Calderon MD sent at 7/22/2022  6:57 PM CDT -----  Regarding: RE: rehab vs Home health  Please follow up with pt, see if HH services are in place. Thank you.   ----- Message -----  From: Ana Jones LPN  Sent: 7/20/2022  11:37 AM CDT  To: Deedee Calderon MD  Subject: FW: rehab vs Home health                           ----- Message -----  From: Diane Rasmussen RN  Sent: 7/20/2022  11:31 AM CDT  To: [facility] Staff  Subject: rehab vs Home health                             Please forward this important TCC information to your provider in order to maximize the post discharge care delivery of this patient.    C3 nurse spoke with Sherin Ortiz and her daughter, Hayley, for a TCC post hospital discharge follow up call. The patient was discharged yesterday from [facility] - post GI bleed.  Patient states today that she is having trouble transferring/weakness.  Triage call initiated - daughter Hayley states that "she is always like this for 5-6 days after being in the hospital."  Triage was then stopped.    Patient's daughter states that the patient usually goes to Rehab after hospital stay. Patient does have orders for [facility] resumption.  C3 nurse spoke to [facility] intake and they will be seeing patient tomorrow and will call patient to let her know, today.  This message is to inform from information during post discharge call.  Please reach out to patient/daughter directly too discuss this with them.    Respectfully,  Diane Rasmussen RN  [facility]    [email or web address]       Please do not reply to this message, as this inbox is not routinely monitored.         "

## 2022-07-28 ENCOUNTER — EXTERNAL HOSPITAL ADMISSION (OUTPATIENT)
Dept: SKILLED NURSING FACILITY | Facility: HOSPITAL | Age: 77
End: 2022-07-28
Payer: MEDICARE

## 2022-07-28 DIAGNOSIS — K92.1 GASTROINTESTINAL HEMORRHAGE WITH MELENA: Primary | ICD-10-CM

## 2022-07-28 PROCEDURE — 99306 PR NURSING FACILITY CARE, INIT, HIGH SEVERITY: ICD-10-PCS | Mod: ,,, | Performed by: INTERNAL MEDICINE

## 2022-07-28 PROCEDURE — 99306 1ST NF CARE HIGH MDM 50: CPT | Mod: ,,, | Performed by: INTERNAL MEDICINE

## 2022-07-28 NOTE — PROGRESS NOTES
[facility]   New Visit Progress Note   Recent Hospital Discharge     PRESENTING HISTORY     Chief Complaint/Reason for Admission:  Follow up Hospital Discharge   PCP: Deedee Calderon MD    History of Present Illness:  Ms. Sherin Ortiz is a 77 y.o. female who was recently admitted to the hospital.  77F recent admission for GIB. She underwent EGD which revealed actively bleeding Dieulafoy lesion in the proximal jejunum - ablated with APC, hemoclips then marked with Celia Ink. She was DC to home unfortunately she had increasing weakness and said she continued to have melanotic stools. Patient complains of difficulty transferring due to weakness in legs. Patient states that she did not ambulate in hospital at all. She was occult positive in the ER and the ER MD talked to GI who plans on repeat endoscopy in AM. [facility] consulted for admission. LUIS ARMANDO ER MD.           ___________________________________________________________________    Today: New admit,recent admission for GIB. She underwent EGD which revealed actively bleeding Dieulafoy lesion in the proximal jejunum - high risk of re bleed, doing well now.       Review of Systems  General ROS: negative for chills, fever or weight loss  Psychological ROS: negative for hallucination, depression or suicidal ideation  Ophthalmic ROS: negative for blurry vision, photophobia or eye pain  ENT ROS: negative for epistaxis, sore throat or rhinorrhea  Respiratory ROS: no cough, shortness of breath, or wheezing  Cardiovascular ROS: no chest pain or dyspnea on exertion  Gastrointestinal ROS: no abdominal pain, change in bowel habits, or black/ bloody stools  Genito-Urinary ROS: no dysuria, trouble voiding, or hematuria  Musculoskeletal ROS: negative for gait disturbance or muscular weakness  Neurological ROS: no syncope or seizures; no ataxia  Dermatological ROS: negative for pruritis, rash and jaundice          PAST HISTORY:     Past Medical History:    Diagnosis Date    Allergy     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    Benign essential hypertension 11/14/2012    Cataract     s/p phacoemulsification    Chronic kidney disease (CKD), stage III (moderate) 9/12/2013    Diabetic peripheral neuropathy associated with type 2 diabetes mellitus 11/14/2014    causing right hemiparesis    Gait disorder     Hyperlipidemia     Iritis - Both Eyes 6/10/2013    Kidney stone     Lymphedema     Morbid obesity with BMI of 40.0-44.9, adult 2/18/2015    Nephrolithiasis 4/20/2016    NS (nuclear sclerosis) 4/1/2013    Nuclear sclerosis - Both Eyes 4/29/2013    Preseptal cellulitis - Right Eye 4/29/2013    Proliferative diabetic retinopathy - Both Eyes 4/29/2013    Proliferative diabetic retinopathy, both eyes 4/1/2013    PSC (posterior subcapsular cataract) - Both Eyes 4/29/2013    S/P hernia repair 12/19/2012    TIA (transient ischemic attack) 11/18/2014    Tinea pedis 7/24/2012    Tinea pedis is present on both feet.     Type 2 diabetes mellitus with diabetic polyneuropathy, with long-term current use of insulin 9/18/2015    Type 2 diabetes mellitus with renal manifestations, controlled 12/12/2013    Type 2 diabetes, controlled, with moderate nonproliferative diabetic retinopathy without macular edema 9/17/2015    Ulcer of left lower extremity, limited to breakdown of skin 7/8/2015    Unspecified cerebral artery occlusion with cerebral infarction 11/16/2014    Unspecified venous (peripheral) insufficiency     Ureteral stone with hydronephrosis 1/27/2016    UTI (lower urinary tract infection)     Vaginal infection     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       Past Surgical History:   Procedure Laterality Date    ABLATION Bilateral 6/23/2022    Procedure: Ablation;  Surgeon: Vahid Farfan MD;  [facility] CATH LAB/EP;  Service: Cardiology;  Laterality: Bilateral;    APPENDECTOMY      CATARACT EXTRACTION W/  INTRAOCULAR LENS IMPLANT  Left 5/21/2013    CATARACT EXTRACTION W/  INTRAOCULAR LENS IMPLANT Right 6/4/2013    CHOLECYSTECTOMY      COLONOSCOPY  12/22/2005    normal    COLONOSCOPY N/A 7/14/2022    Procedure: COLONOSCOPY;  Surgeon: Kannan Mace MD;  Location: [facility] ENDO;  Service: Endoscopy;  Laterality: N/A;    ESOPHAGOGASTRODUODENOSCOPY  12/21/2015    hiatal hernia, Schatzki ring    ESOPHAGOGASTRODUODENOSCOPY N/A 7/13/2022    Procedure: EGD (ESOPHAGOGASTRODUODENOSCOPY);  Surgeon: Kannan Mace MD;  Location: [facility] ENDO;  Service: Endoscopy;  Laterality: N/A;    EYE SURGERY Bilateral 2008    laser surgery both eyes    INTRALUMINAL GASTROINTESTINAL TRACT IMAGING VIA CAPSULE N/A 7/15/2022    Procedure: IMAGING PROCEDURE, GI TRACT, INTRALUMINAL, VIA CAPSULE;  Surgeon: Kannan Mace MD;  Location: [facility] ENDO;  Service: Endoscopy;  Laterality: N/A;    NASAL SEPTUM SURGERY      SMALL BOWEL ENTEROSCOPY N/A 7/18/2022    Procedure: ENTEROSCOPY Upper SBE;  Surgeon: Salo Frank MD;  Location: [facility];  Service: Endoscopy;  Laterality: N/A;    SUBTOTAL COLECTOMY  12/13/2012    transverse colon, for incarcerated umbilical hernia, Dr. Kat Bower       Family History   Problem Relation Age of Onset    Diabetes Sister     Cataracts Sister     Heart disease Brother     Cataracts Brother     Leukemia Mother     Cancer Neg Hx     Amblyopia Neg Hx     Blindness Neg Hx     Glaucoma Neg Hx     Hypertension Neg Hx     Macular degeneration Neg Hx     Retinal detachment Neg Hx     Strabismus Neg Hx     Stroke Neg Hx     Thyroid disease Neg Hx     Kidney disease Neg Hx          MEDICATIONS & ALLERGIES:     Current Outpatient Medications on File Prior to Visit   Medication Sig Dispense Refill    ascorbic acid, vitamin C, (VITAMIN C) 500 MG tablet Take 500 mg by mouth once daily.      aspirin 81 MG Chew Chew 1 tablet (81 mg total) by mouth once daily. 30 tablet 5    atorvastatin (LIPITOR) 40 MG tablet Take 1 tablet  (40 mg total) by mouth every evening. 90 tablet 3    ferrous sulfate 325 (65 FE) MG EC tablet Take 1 tablet (325 mg total) by mouth once daily.  0    insulin detemir U-100 (LEVEMIR FLEXTOUCH) 100 unit/mL (3 mL) SubQ InPn pen Inject 10 Units into the skin every evening.  3    lisinopriL (PRINIVIL,ZESTRIL) 5 MG tablet Take 1 tablet (5 mg total) by mouth once daily. 90 tablet 3    pantoprazole (PROTONIX) 40 MG tablet Take 1 tablet (40 mg total) by mouth once daily. 30 tablet 11    [DISCONTINUED] blood glucose control, high (TRUE METRIX LEVEL 3) Soln Used to calibrate weekly 1 each 3    [DISCONTINUED] insulin glargine (LANTUS U-100 INSULIN) 100 unit/mL injection Inject 10-15 Units into the skin every evening. DEPENDING ON SCALE (DISCARD EACH VIAL AFTER 28 DAYS) 2 each 3    [DISCONTINUED] lancets (TRUEPLUS LANCETS) 33 gauge Misc 1 lancet by Misc.(Non-Drug; Combo Route) route 2 (two) times a day. 200 each 0     No current facility-administered medications on file prior to visit.        Review of patient's allergies indicates:   Allergen Reactions    Penicillins Hives     Other reaction(s): Hives  Patient has received cefdinir, ceftriaxone, cefazolin and cefepime in the past with no documented reactions    Sulfa (sulfonamide antibiotics) Other (See Comments)     Shajosie, pt states her doctor told her the shakes were possibly caused by an allergy to sulfa       OBJECTIVE:     Vital Signs:  LMP  (LMP Unknown) Comment: patient refused to weigh/wounds to legs and toes   Wt Readings from Last 1 Encounters:   07/21/22 0249 127.2 kg (280 lb 6.8 oz)   07/20/22 1640 127 kg (280 lb)     There is no height or weight on file to calculate BMI.        Physical Exam:  LMP  (LMP Unknown) Comment: patient refused to weigh/wounds to legs and toes   General appearance: alert, cooperative, no distress  Constitutional:Oriented to person, place, and time  + appears well-developed and well-nourished.   HEENT: Normocephalic, atraumatic,  neck symmetrical, no nasal discharge   Eyes: conjunctivae/corneas clear, PERRL, EOM's intact  Lungs: clear to auscultation bilaterally, no dullness to percussion bilaterally  Heart: regular rate and rhythm without rub; no displacement of the PMI   Abdomen: soft, non-tender; bowel sounds normoactive; no organomegaly  Extremities: extremities symmetric; no clubbing, cyanosis, or edema  Integument: Skin color, texture, turgor normal; no rashes; hair distrubution normal  Neurologic: Alert and oriented X 3, normal strength, normal coordination and gait  Psychiatric: no pressured speech; normal affect; no evidence of impaired cognition     Laboratory  Lab Results   Component Value Date    WBC 4.14 07/22/2022    HGB 7.3 (L) 07/22/2022    HCT 24.0 (L) 07/22/2022    MCV 93 07/22/2022     07/22/2022     BMP  Lab Results   Component Value Date     07/22/2022    K 4.1 07/22/2022     (H) 07/22/2022    CO2 22 (L) 07/22/2022    BUN 24 (H) 07/22/2022    CREATININE 1.2 07/22/2022    CALCIUM 8.1 (L) 07/22/2022    ANIONGAP 6 (L) 07/22/2022    ESTGFRAFRICA 50 (A) 07/22/2022    EGFRNONAA 43 (A) 07/22/2022     Lab Results   Component Value Date    ALT 9 (L) 07/20/2022    AST 14 07/20/2022    GGT 37 01/22/2018    ALKPHOS 147 (H) 07/20/2022    BILITOT 0.3 07/20/2022     Lab Results   Component Value Date    INR 1.1 01/18/2020    INR 1.1 03/22/2019    INR 1.0 01/06/2018     Lab Results   Component Value Date    HGBA1C 8.4 (H) 06/30/2022       Diagnostic Results:        TRANSITION OF CARE:         ASSESSMENT & PLAN:     HIGH RISK CONDITION(S):  ACP (advance care planning)  - DNR     GIB (gastrointestinal bleeding)  - presents with recurrence of melena  - Hb stable  - discussed with GI: suspect old blood rather than recurrence of bleeding  PPI daily  Serial h/h   Monitor all HD parameters and transfuse as needed  Tele        Type 2 diabetes mellitus with diabetic polyneuropathy, with long-term current use of insulin  Resume  long-acting added SSI  Adjust based on trends        Stage 4 chronic kidney disease  Renal fxn at baseline   No nsaids or cox2 (-)  Repeat chem        Debility  - very weak following recent hospitalization  - PT/OT consult: recommend SNF        Essential hypertension  Didn't see BP meds on home list   Would hold any BP meds for possible hypotension         Hyperlipidemia LDL goal <100  -continue statin  Instructions for the patient:      Scheduled Follow-up :  Future Appointments   Date Time Provider Department Center   8/11/2022  2:00 PM Gretta Perry DO [facility] WOUND [facility]   8/19/2022  8:05 AM LAB, [facility]   8/19/2022  8:30 AM SPECIMEN, [place] [facility]   8/23/2022  1:30 PM Katie Knott MD [facility] BAR[facility]AT [facility]   10/5/2022 10:30 AM Deedee Calderon MD [facility] IM [facility] PC[facility]   10/11/2022  1:15 PM Marlin Adams DPM [facility] POD [facility]       Post Visit Medication List:     Medication List          Accurate as of July 28, 2022 11:26 AM. If you have any questions, ask your nurse or doctor.            CONTINUE taking these medications    atorvastatin 40 MG tablet  Commonly known as: LIPITOR  Take 1 tablet (40 mg total) by mouth every evening.     SHANNAN CHEWABLE ASPIRIN 81 MG Chew  Generic drug: aspirin  Chew 1 tablet (81 mg total) by mouth once daily.     ferrous sulfate 325 (65 FE) MG EC tablet  Take 1 tablet (325 mg total) by mouth once daily.     insulin detemir U-100 100 unit/mL (3 mL) Inpn pen  Commonly known as: Levemir FLEXTOUCH  Inject 10 Units into the skin every evening.     lisinopriL 5 MG tablet  Commonly known as: PRINIVIL,ZESTRIL  Take 1 tablet (5 mg total) by mouth once daily.     pantoprazole 40 MG tablet  Commonly known as: PROTONIX  Take 1 tablet (40 mg total) by mouth once daily.     VITAMIN C 500 MG tablet  Generic drug: ascorbic acid (vitamin C)            Signing Physician:  Kannan Gonzalez MD

## 2022-07-30 ENCOUNTER — DOCUMENT SCAN (OUTPATIENT)
Dept: HOME HEALTH SERVICES | Facility: HOSPITAL | Age: 77
End: 2022-07-30
Payer: MEDICARE

## 2022-08-01 RX ORDER — CLOPIDOGREL BISULFATE 75 MG/1
75 TABLET ORAL DAILY
Qty: 90 TABLET | Refills: 3 | OUTPATIENT
Start: 2022-08-01

## 2022-08-01 NOTE — TELEPHONE ENCOUNTER
Rx pended request came via fax from [facility]

## 2022-08-01 NOTE — TELEPHONE ENCOUNTER
No new care gaps identified.  [facility] Embedded Care Gaps. Reference number: [id number]. 8/01/2022   11:23:36 AM KRISTENT

## 2022-08-03 ENCOUNTER — TELEPHONE (OUTPATIENT)
Dept: INTERNAL MEDICINE | Facility: CLINIC | Age: 77
End: 2022-08-03
Payer: MEDICARE

## 2022-08-03 PROCEDURE — G0180 PR HOME HEALTH MD CERTIFICATION: ICD-10-PCS | Mod: ,,, | Performed by: INTERNAL MEDICINE

## 2022-08-03 PROCEDURE — G0180 MD CERTIFICATION HHA PATIENT: HCPCS | Mod: ,,, | Performed by: INTERNAL MEDICINE

## 2022-08-03 RX ORDER — CLOPIDOGREL BISULFATE 75 MG/1
75 TABLET ORAL DAILY
Qty: 90 TABLET | Refills: 3 | OUTPATIENT
Start: 2022-08-03

## 2022-08-03 NOTE — TELEPHONE ENCOUNTER
No new care gaps identified.  [facility] Embedded Care Gaps. Reference number: [id number]. 8/03/2022   1:47:04 PM CDT

## 2022-08-03 NOTE — TELEPHONE ENCOUNTER
----- Message from Kim Berry sent at 8/3/2022 11:16 AM CDT -----  Contact: [facility]/Wally/  Wally from [facility] said that she is calling in regards to needing to clarify pt's medications and wound care orders. Please advise

## 2022-08-12 ENCOUNTER — TELEPHONE (OUTPATIENT)
Dept: NEPHROLOGY | Facility: CLINIC | Age: 77
End: 2022-08-12
Payer: MEDICARE

## 2022-08-17 ENCOUNTER — DOCUMENT SCAN (OUTPATIENT)
Dept: HOME HEALTH SERVICES | Facility: HOSPITAL | Age: 77
End: 2022-08-17
Payer: MEDICARE

## 2022-08-18 ENCOUNTER — HOSPITAL ENCOUNTER (OUTPATIENT)
Dept: WOUND CARE | Facility: HOSPITAL | Age: 77
Discharge: HOME | End: 2022-08-18
Attending: SURGERY
Payer: MEDICARE

## 2022-08-18 VITALS
DIASTOLIC BLOOD PRESSURE: 74 MMHG | HEART RATE: 75 BPM | BODY MASS INDEX: 43.95 KG/M2 | WEIGHT: 280 LBS | HEIGHT: 67 IN | SYSTOLIC BLOOD PRESSURE: 141 MMHG | TEMPERATURE: 98 F

## 2022-08-18 DIAGNOSIS — L97.929 VENOUS ULCER OF BOTH LOWER EXTREMITIES WITH VARICOSE VEINS: Primary | ICD-10-CM

## 2022-08-18 DIAGNOSIS — I87.2 VENOUS INSUFFICIENCY OF BOTH LOWER EXTREMITIES: ICD-10-CM

## 2022-08-18 DIAGNOSIS — I83.019 VENOUS ULCER OF BOTH LOWER EXTREMITIES WITH VARICOSE VEINS: Primary | ICD-10-CM

## 2022-08-18 DIAGNOSIS — I87.2 VENOUS REFLUX: ICD-10-CM

## 2022-08-18 DIAGNOSIS — Z79.4 TYPE 2 DIABETES MELLITUS WITH DIABETIC POLYNEUROPATHY, WITH LONG-TERM CURRENT USE OF INSULIN: ICD-10-CM

## 2022-08-18 DIAGNOSIS — I83.029 VENOUS ULCER OF BOTH LOWER EXTREMITIES WITH VARICOSE VEINS: Primary | ICD-10-CM

## 2022-08-18 DIAGNOSIS — L97.919 VENOUS ULCER OF BOTH LOWER EXTREMITIES WITH VARICOSE VEINS: Primary | ICD-10-CM

## 2022-08-18 DIAGNOSIS — E11.42 TYPE 2 DIABETES MELLITUS WITH DIABETIC POLYNEUROPATHY, WITH LONG-TERM CURRENT USE OF INSULIN: ICD-10-CM

## 2022-08-18 PROCEDURE — 29581 APPL MULTLAYER CMPRN SYS LEG: CPT

## 2022-08-18 PROCEDURE — 99204 OFFICE O/P NEW MOD 45 MIN: CPT | Mod: 25

## 2022-08-18 NOTE — PROGRESS NOTES
[facility]    Subjective:       Patient ID: Sherin Ortiz is a 77 y.o. female.    Chief Complaint: Venous Stasis (Both legs)    8/18/22: Patient readmitted today for venous stasis of BLE and diabetic wound of left 2nd toe ( Grade 2). Since last visit patient was hospitalized for GI bleed. Leg edema appears improved. Seen today by Dr. Perry. Xoftel AG to open areas. Co-flex with calamine to BLE toes to knee. Hydrofera ready and toeball to left 2nd toe. Orders sent to [facility] for weekly dressing changes. Will follow up on Circ aids to BLE. No other new c/o or sx.    Review of Systems    All systems were reviewed and are negative, except that mentioned in the HPI.    Objective:        Physical Exam  Constitutional:       Appearance: She is well-developed.   HENT:      Head: Normocephalic and atraumatic.   Eyes:      Pupils: Pupils are equal, round, and reactive to light.   Cardiovascular:      Rate and Rhythm: Normal rate.   Pulmonary:      Effort: Pulmonary effort is normal. No respiratory distress.      Breath sounds: No stridor.   Abdominal:      General: There is no distension.   Musculoskeletal:      Cervical back: Normal range of motion.   Skin:     Comments: See Synopsis for wound details   Neurological:      Mental Status: She is alert and oriented to person, place, and time.            Wound 08/18/22 1400 Other (comment) Left lower Leg (Active)   08/18/22 1400    Pre-existing: Yes   Primary Wound Type: Other   Side: Left   Orientation: lower   Location: Leg   Wound Number:    Ankle-Brachial Index:    Pulses: doppler   Removal Indication and Assessment:    Wound Outcome:    (Retired) Wound Type:    (Retired) Wound Length (cm):    (Retired) Wound Width (cm):    (Retired) Depth (cm):    Wound Description (Comments):    Removal Indications:    Wound Image   08/18/22 1400   Dressing Appearance Moist drainage 08/18/22 1400   Drainage Amount Moderate  08/18/22 1400   Drainage Characteristics/Odor Serosanguineous 08/18/22 1400   Appearance Red;Moist 08/18/22 1400   Tissue loss description Partial thickness 08/18/22 1400   Red (%), Wound Tissue Color 100 % 08/18/22 1400   Periwound Area Edematous 08/18/22 1400   Wound Edges Undefined 08/18/22 1400   Care Cleansed with:;Sterile normal saline 08/18/22 1400   Dressing Applied;Calcium alginate;Silver;Compression wrap 08/18/22 1400   Periwound Care Moisturizer applied 08/18/22 1400   Compression Two layer compression 08/18/22 1400   Off Loading Off loading shoe 08/18/22 1400   Dressing Change Due 08/25/22 08/18/22 1400            Wound 08/18/22 1400 Other (comment) Right lower Leg (Active)   08/18/22 1400    Pre-existing: Yes   Primary Wound Type: Other   Side: Right   Orientation: lower   Location: Leg   Wound Number:    Ankle-Brachial Index:    Pulses: doppler   Removal Indication and Assessment:    Wound Outcome:    (Retired) Wound Type:    (Retired) Wound Length (cm):    (Retired) Wound Width (cm):    (Retired) Depth (cm):    Wound Description (Comments):    Removal Indications:    Wound Image   08/18/22 1400   Dressing Appearance Moist drainage 08/18/22 1400   Drainage Amount Moderate 08/18/22 1400   Drainage Characteristics/Odor Serosanguineous 08/18/22 1400   Appearance Red;Moist 08/18/22 1400   Tissue loss description Partial thickness 08/18/22 1400   Red (%), Wound Tissue Color 100 % 08/18/22 1400   Periwound Area Edematous;Hemosiderin Staining 08/18/22 1400   Wound Edges Undefined 08/18/22 1400   Care Cleansed with:;Sterile normal saline 08/18/22 1400   Dressing Applied;Compression wrap 08/18/22 1400   Periwound Care Moisturizer applied 08/18/22 1400   Compression Two layer compression 08/18/22 1400   Off Loading Off loading shoe 08/18/22 1400   Dressing Change Due 08/25/22 08/18/22 1400            Wound 08/18/22 1400 Diabetic Ulcer Left Toe, second (Active)   08/18/22 1400    Pre-existing: Yes   Primary Wound  Type: Diabetic ulc   Side: Left   Orientation:    Location: Toe, second   Wound Number:    Ankle-Brachial Index:    Pulses:    Removal Indication and Assessment:    Wound Outcome:    (Retired) Wound Type:    (Retired) Wound Length (cm):    (Retired) Wound Width (cm):    (Retired) Depth (cm):    Wound Description (Comments):    Removal Indications:    Wound Image   08/18/22 1400   Dressing Appearance Moist drainage 08/18/22 1400   Drainage Amount Small 08/18/22 1400   Drainage Characteristics/Odor Serosanguineous 08/18/22 1400   Appearance Red;Moist 08/18/22 1400   Tissue loss description Full thickness 08/18/22 1400   Red (%), Wound Tissue Color 100 % 08/18/22 1400   Periwound Area Macerated 08/18/22 1400   Wound Edges Defined 08/18/22 1400   Fischer Classification (diabetic foot ulcers only) Grade 2 08/18/22 1400   Wound Length (cm) 1 cm 08/18/22 1400   Wound Width (cm) 1 cm 08/18/22 1400   Wound Depth (cm) 0.1 cm 08/18/22 1400   Wound Volume (cm^3) 0.1 cm^3 08/18/22 1400   Wound Surface Area (cm^2) 1 cm^2 08/18/22 1400   Care Cleansed with:;Sterile normal saline 08/18/22 1400   Dressing Hydrofiber;Rolled gauze 08/18/22 1400   Periwound Care Absorptive dressing applied 08/18/22 1400   Off Loading Other (see comments) 08/18/22 1400   Dressing Change Due 08/25/22 08/18/22 1400         Assessment:         ICD-10-CM ICD-9-CM   1. Venous ulcer of both lower extremities with varicose veins  I83.019 454.0    I83.029     L97.919     L97.929    2. Venous insufficiency of both lower extremities  I87.2 459.81   3. Type 2 diabetes mellitus with diabetic polyneuropathy, with long-term current use of insulin  E11.42 250.60    Z79.4 357.2     V58.67   4. Venous reflux  I87.2 459.81         Plan:   Tissue pathology and/or culture taken:  [] Yes [x] No   Sharp debridement performed:   [] Yes [x] No   Labs ordered this visit:   [] Yes [x] No   Imaging ordered this visit:   [] Yes [x] No           Orders Placed This Encounter    Procedures    Ambulatory referral/consult to Wound Clinic     Standing Status:   Standing     Number of Occurrences:   1     Referral Priority:   Routine     Referral Type:   Consultation     Referral Reason:   Specialty Services Required     Requested Specialty:   Wound Care     Number of Visits Requested:   1    Change dressing     Right and left lower legs:    Cleanse wound with: Normal Saline   Lidocaine:PRN   Primary dressing: Aquacel AG to any open areas  Secondary dressing: Co-flex with calamine BLE toes to knee  Edema control: Co-flex with calamine to BLE toes to knee, protect bilateral toes with small abd pad     Left 2nd toe:  Cleanse with: Normal saline  Primary dressing: Hydrofera ready  Secondary dressing: conform rolled gauze, flexinet, mefix tape  Frequency: Thursdays  Follow-up:  Dr. Perry Thursday 9/8/22    Home Health: [facility]: Orders as above. Change dressings Thursdays and prn.     Other Orders: Protect bilateral toes with small abd pad. [facility] has [facility] order and should call to arrange measurements.  Pt has their phone number as well.        Follow up in about 3 weeks (around 9/8/2022).

## 2022-08-23 ENCOUNTER — OFFICE VISIT (OUTPATIENT)
Dept: BARIATRICS | Facility: CLINIC | Age: 77
End: 2022-08-23
Payer: MEDICARE

## 2022-08-23 VITALS
WEIGHT: 280 LBS | TEMPERATURE: 98 F | OXYGEN SATURATION: 96 % | BODY MASS INDEX: 43.85 KG/M2 | SYSTOLIC BLOOD PRESSURE: 145 MMHG | DIASTOLIC BLOOD PRESSURE: 76 MMHG | HEART RATE: 72 BPM

## 2022-08-23 DIAGNOSIS — Z71.3 ENCOUNTER FOR WEIGHT LOSS COUNSELING: ICD-10-CM

## 2022-08-23 DIAGNOSIS — E78.5 HYPERLIPIDEMIA LDL GOAL <100: Chronic | ICD-10-CM

## 2022-08-23 DIAGNOSIS — E11.649 TYPE 2 DIABETES MELLITUS WITH HYPOGLYCEMIA WITHOUT COMA, WITH LONG-TERM CURRENT USE OF INSULIN: ICD-10-CM

## 2022-08-23 DIAGNOSIS — E66.01 CLASS 3 SEVERE OBESITY DUE TO EXCESS CALORIES WITH SERIOUS COMORBIDITY AND BODY MASS INDEX (BMI) OF 40.0 TO 44.9 IN ADULT: Primary | ICD-10-CM

## 2022-08-23 DIAGNOSIS — Z79.4 TYPE 2 DIABETES MELLITUS WITH HYPOGLYCEMIA WITHOUT COMA, WITH LONG-TERM CURRENT USE OF INSULIN: ICD-10-CM

## 2022-08-23 DIAGNOSIS — I10 ESSENTIAL HYPERTENSION: Chronic | ICD-10-CM

## 2022-08-23 PROCEDURE — 1159F MED LIST DOCD IN RCRD: CPT | Mod: CPTII,S$GLB,, | Performed by: STUDENT IN AN ORGANIZED HEALTH CARE EDUCATION/TRAINING PROGRAM

## 2022-08-23 PROCEDURE — 1160F RVW MEDS BY RX/DR IN RCRD: CPT | Mod: CPTII,S$GLB,, | Performed by: STUDENT IN AN ORGANIZED HEALTH CARE EDUCATION/TRAINING PROGRAM

## 2022-08-23 PROCEDURE — 1160F PR REVIEW ALL MEDS BY PRESCRIBER/CLIN PHARMACIST DOCUMENTED: ICD-10-PCS | Mod: CPTII,S$GLB,, | Performed by: STUDENT IN AN ORGANIZED HEALTH CARE EDUCATION/TRAINING PROGRAM

## 2022-08-23 PROCEDURE — 99204 PR OFFICE/OUTPT VISIT, NEW, LEVL IV, 45-59 MIN: ICD-10-PCS | Mod: S$GLB,,, | Performed by: STUDENT IN AN ORGANIZED HEALTH CARE EDUCATION/TRAINING PROGRAM

## 2022-08-23 PROCEDURE — 1126F AMNT PAIN NOTED NONE PRSNT: CPT | Mod: CPTII,S$GLB,, | Performed by: STUDENT IN AN ORGANIZED HEALTH CARE EDUCATION/TRAINING PROGRAM

## 2022-08-23 PROCEDURE — 3077F PR MOST RECENT SYSTOLIC BLOOD PRESSURE >= 140 MM HG: ICD-10-PCS | Mod: CPTII,S$GLB,, | Performed by: STUDENT IN AN ORGANIZED HEALTH CARE EDUCATION/TRAINING PROGRAM

## 2022-08-23 PROCEDURE — 3078F DIAST BP <80 MM HG: CPT | Mod: CPTII,S$GLB,, | Performed by: STUDENT IN AN ORGANIZED HEALTH CARE EDUCATION/TRAINING PROGRAM

## 2022-08-23 PROCEDURE — 1159F PR MEDICATION LIST DOCUMENTED IN MEDICAL RECORD: ICD-10-PCS | Mod: CPTII,S$GLB,, | Performed by: STUDENT IN AN ORGANIZED HEALTH CARE EDUCATION/TRAINING PROGRAM

## 2022-08-23 PROCEDURE — 99999 PR PBB SHADOW E&M-EST. PATIENT-LVL III: CPT | Mod: PBBFAC,,, | Performed by: STUDENT IN AN ORGANIZED HEALTH CARE EDUCATION/TRAINING PROGRAM

## 2022-08-23 PROCEDURE — 3077F SYST BP >= 140 MM HG: CPT | Mod: CPTII,S$GLB,, | Performed by: STUDENT IN AN ORGANIZED HEALTH CARE EDUCATION/TRAINING PROGRAM

## 2022-08-23 PROCEDURE — 3078F PR MOST RECENT DIASTOLIC BLOOD PRESSURE < 80 MM HG: ICD-10-PCS | Mod: CPTII,S$GLB,, | Performed by: STUDENT IN AN ORGANIZED HEALTH CARE EDUCATION/TRAINING PROGRAM

## 2022-08-23 PROCEDURE — 1101F PR PT FALLS ASSESS DOC 0-1 FALLS W/OUT INJ PAST YR: ICD-10-PCS | Mod: CPTII,S$GLB,, | Performed by: STUDENT IN AN ORGANIZED HEALTH CARE EDUCATION/TRAINING PROGRAM

## 2022-08-23 PROCEDURE — 3288F PR FALLS RISK ASSESSMENT DOCUMENTED: ICD-10-PCS | Mod: CPTII,S$GLB,, | Performed by: STUDENT IN AN ORGANIZED HEALTH CARE EDUCATION/TRAINING PROGRAM

## 2022-08-23 PROCEDURE — 3288F FALL RISK ASSESSMENT DOCD: CPT | Mod: CPTII,S$GLB,, | Performed by: STUDENT IN AN ORGANIZED HEALTH CARE EDUCATION/TRAINING PROGRAM

## 2022-08-23 PROCEDURE — 1101F PT FALLS ASSESS-DOCD LE1/YR: CPT | Mod: CPTII,S$GLB,, | Performed by: STUDENT IN AN ORGANIZED HEALTH CARE EDUCATION/TRAINING PROGRAM

## 2022-08-23 PROCEDURE — 99204 OFFICE O/P NEW MOD 45 MIN: CPT | Mod: S$GLB,,, | Performed by: STUDENT IN AN ORGANIZED HEALTH CARE EDUCATION/TRAINING PROGRAM

## 2022-08-23 PROCEDURE — 99999 PR PBB SHADOW E&M-EST. PATIENT-LVL III: ICD-10-PCS | Mod: PBBFAC,,, | Performed by: STUDENT IN AN ORGANIZED HEALTH CARE EDUCATION/TRAINING PROGRAM

## 2022-08-23 PROCEDURE — 1126F PR PAIN SEVERITY QUANTIFIED, NO PAIN PRESENT: ICD-10-PCS | Mod: CPTII,S$GLB,, | Performed by: STUDENT IN AN ORGANIZED HEALTH CARE EDUCATION/TRAINING PROGRAM

## 2022-08-23 NOTE — PROGRESS NOTES
Subjective:       Patient ID: Sherin Ortiz is a 77 y.o. female.    Chief Complaint: Consult and Obesity    Patient presents for treatment of obesity.     Co-morbidities:   HTN  HLD  DM2  CKD  Heart Failure  H/o TIA  Lymphedema  Nephrolithiasis    Current Physical Activity  Limited mobility, uses electric wheel chair    Current Eating Habits  Breakfast - toast, eggs, and lima, grits; cereal (honey nut cheerios)  Lunch - meal delivery - hamburger, red beans and rice  Dinner - leftovers, kids bring her meals  Snacks - potato chips, fruit  Beverages - diet soft drinks, water with flavor packets    Medical Weight Loss - unable to stand on [employer] scale  8/23/2022: 280 lbs, BMI 43.85    Review of Systems   Constitutional: Negative for chills and fever.   Respiratory: Negative for shortness of breath.    Cardiovascular: Negative for chest pain.   Gastrointestinal: Negative for nausea and vomiting.   Neurological: Negative for dizziness and light-headedness.         Objective:        Latest Reference Range & Units 07/22/22 07:24   WBC 3.90 - 12.70 K/uL 4.14   RBC 4.00 - 5.40 M/uL 2.57 (L)   Hemoglobin 12.0 - 16.0 g/dL 7.3 (L)   Hematocrit 37.0 - 48.5 % 24.0 (L)   MCV 82 - 98 fL 93   MCH 27.0 - 31.0 pg 28.4   MCHC 32.0 - 36.0 g/dL 30.4 (L)   RDW 11.5 - 14.5 % 15.9 (H)   Platelets 150 - 450 K/uL 189   MPV 9.2 - 12.9 fL 10.0   Gran % 38.0 - 73.0 % 64.5   Lymph % 18.0 - 48.0 % 23.7   Mono % 4.0 - 15.0 % 8.9   Eosinophil % 0.0 - 8.0 % 1.7   Basophil % 0.0 - 1.9 % 0.7   Immature Granulocytes 0.0 - 0.5 % 0.5   Gran # (ANC) 1.8 - 7.7 K/uL 2.7   Lymph # 1.0 - 4.8 K/uL 1.0   Mono # 0.3 - 1.0 K/uL 0.4   Eos # 0.0 - 0.5 K/uL 0.1   Baso # 0.00 - 0.20 K/uL 0.03   Immature Grans (Abs) 0.00 - 0.04 K/uL 0.02   nRBC 0 /100 WBC 0   Differential Method  Automated   Sodium 136 - 145 mmol/L 142   Potassium 3.5 - 5.1 mmol/L 4.1   Chloride 95 - 110 mmol/L 114 (H)   CO2 23 - 29 mmol/L 22 (L)   Anion Gap 8 - 16 mmol/L 6 (L)   BUN 8 - 23 mg/dL  24 (H)   Creatinine 0.5 - 1.4 mg/dL 1.2   eGFR if non African American >60 mL/min/1.73 m^2 43 !   eGFR if African American >60 mL/min/1.73 m^2 50 !   Glucose 70 - 110 mg/dL 82   Calcium 8.7 - 10.5 mg/dL 8.1 (L)   (L): Data is abnormally low  (H): Data is abnormally high  !: Data is abnormal    Vitals:    08/23/22 1309   BP: (!) 145/76   Pulse: 72   Temp: 98.3 °F (36.8 °C)       Physical Exam  Vitals reviewed.   Constitutional:       General: She is not in acute distress.     Appearance: She is obese. She is not ill-appearing, toxic-appearing or diaphoretic.   Cardiovascular:      Rate and Rhythm: Normal rate.   Pulmonary:      Effort: Pulmonary effort is normal. No respiratory distress.   Neurological:      Mental Status: She is oriented to person, place, and time.   Psychiatric:         Mood and Affect: Mood normal.         Behavior: Behavior normal.         Assessment:       Problem List Items Addressed This Visit     Hyperlipidemia LDL goal <100 (Chronic)    Essential hypertension (Chronic)    Type 2 diabetes mellitus with hypoglycemia without coma, with long-term current use of insulin      Other Visit Diagnoses     Class 3 severe obesity due to excess calories with serious comorbidity and body mass index (BMI) of 40.0 to 44.9 in adult    -  Primary    Encounter for weight loss counseling              Plan:   - No weight loss medication options.  May benefit from GLP-1 Agonist but given that patient is on insulin and state of kidney function, risks may outweigh the weight loss benefit.    - Discussed diabetic diet.  Also encouraged patient to have discussions with family members who bring her food

## 2022-08-23 NOTE — PATIENT INSTRUCTIONS
Meal Planning & Grocery Shopping    Meal planning builds the foundation for healthy eating. When you have structured ideas for healthy meals and foods available at home to prepare those meals, weight control becomes easier.  If only healthy foods are available at home, then you will be much more likely to eat healthy foods. And you will be less likely to go to a restaurant or  a fast food meal, which tend to be unhealthy and higher in calories than meals prepared at home.      Take 5-10 minutes each week to plan meals for the next 7 days.  Make a grocery list based on the meal plan.    Grocery Shopping Tips:  Shop on a full stomach.  Schedule your shopping for times when you are most motivated and able to be disciplined about your purchases. For example, after a stressful day at work it may be difficult to make the healthiest choices. Shopping at other times, such as early in the morning or after dinner, may be easier.  Focus your shopping on the outside aisles of the store, which tend to contain more fresh foods and lower calorie foods. The inside aisles tend to have more processed foods.  Stick to your list. Avoid buying unhealthy items just because they are on sale.   Compare nutrition labels to check the number of calories and percentage of fat.      What to buy:    Vegetables  Fresh vegetables  Frozen vegetables with no sauce or added salt  Canned vegetables with no sauce or added salt    Protein  Lean meats, such as chicken and turkey  Limit red meats, such as beef to no more than 1x/week  Limit processed meats, such as cold cuts, lima, sausage, and hot dogs. Look for brands that have no nitrites and are minimally processed. Consider turkey sausage or turkey lima.  Fish and Shellfish  Eggs  Dried beans  Canned beans (reduced sodium)    Fat  Use healthy oils, such as olive oil or canola oil, for cooking, salad dressings, etc.  Unflavored nuts and seeds  Nut butters (no added sugar)    Dairy  Yogurt (no  sugar added)  Cheese  Low-fat milk  Unsweetened nondairy milks (almond milk, soy milk, etc)    Fruit  Fresh Fruit  Frozen fruit with no added sugar  Canned fruit with no added sugar  Dried fruit with no added sugar  100% fruit juice    Whole Grains  Single ingredient grains, such as oats, quinoa, brown rice  Whole-wheat pasta  Sprouted whole-grain bread    What to avoid:  - Avoid fried foods  - Avoid foods with added sugar  - Avoid sugar-sweetened beverages  - Avoid ultra-processed foods            Copyright © 2011, Levine, Susan. \Hospital Has a New Name and Outlook.\"". For more information about The Healthy Eating Plate, please see The Nutrition Source, Department of Nutrition, [employer], www.thenutritionsource.org, and [employer], www.health.[place].edu.

## 2022-08-24 ENCOUNTER — LAB VISIT (OUTPATIENT)
Dept: LAB | Facility: HOSPITAL | Age: 77
End: 2022-08-24
Attending: NURSE PRACTITIONER
Payer: MEDICARE

## 2022-08-24 DIAGNOSIS — I11.0 HYPERTENSIVE HEART DISEASE WITH CONGESTIVE HEART FAILURE: Primary | ICD-10-CM

## 2022-08-24 LAB
ALBUMIN SERPL BCP-MCNC: 2.8 G/DL (ref 3.5–5.2)
ANION GAP SERPL CALC-SCNC: 12 MMOL/L (ref 8–16)
BACTERIA #/AREA URNS AUTO: ABNORMAL /HPF
BASOPHILS # BLD AUTO: 0.03 K/UL (ref 0–0.2)
BASOPHILS NFR BLD: 0.7 % (ref 0–1.9)
BILIRUB UR QL STRIP: NEGATIVE
BUN SERPL-MCNC: 30 MG/DL (ref 8–23)
CALCIUM SERPL-MCNC: 9.4 MG/DL (ref 8.7–10.5)
CHLORIDE SERPL-SCNC: 101 MMOL/L (ref 95–110)
CLARITY UR REFRACT.AUTO: CLEAR
CO2 SERPL-SCNC: 24 MMOL/L (ref 23–29)
COLOR UR AUTO: YELLOW
CREAT SERPL-MCNC: 2.4 MG/DL (ref 0.5–1.4)
CREAT UR-MCNC: 37 MG/DL (ref 15–325)
DIFFERENTIAL METHOD: ABNORMAL
EOSINOPHIL # BLD AUTO: 0.1 K/UL (ref 0–0.5)
EOSINOPHIL NFR BLD: 3 % (ref 0–8)
ERYTHROCYTE [DISTWIDTH] IN BLOOD BY AUTOMATED COUNT: 13.6 % (ref 11.5–14.5)
EST. GFR  (NO RACE VARIABLE): 20 ML/MIN/1.73 M^2
GLUCOSE SERPL-MCNC: 297 MG/DL (ref 70–110)
GLUCOSE UR QL STRIP: ABNORMAL
HCT VFR BLD AUTO: 29.2 % (ref 37–48.5)
HGB BLD-MCNC: 9.4 G/DL (ref 12–16)
HGB UR QL STRIP: NEGATIVE
HYALINE CASTS UR QL AUTO: 0 /LPF
IMM GRANULOCYTES # BLD AUTO: 0.01 K/UL (ref 0–0.04)
IMM GRANULOCYTES NFR BLD AUTO: 0.2 % (ref 0–0.5)
KETONES UR QL STRIP: NEGATIVE
LEUKOCYTE ESTERASE UR QL STRIP: ABNORMAL
LYMPHOCYTES # BLD AUTO: 1.1 K/UL (ref 1–4.8)
LYMPHOCYTES NFR BLD: 25.2 % (ref 18–48)
MCH RBC QN AUTO: 28 PG (ref 27–31)
MCHC RBC AUTO-ENTMCNC: 32.2 G/DL (ref 32–36)
MCV RBC AUTO: 87 FL (ref 82–98)
MICROSCOPIC COMMENT: ABNORMAL
MONOCYTES # BLD AUTO: 0.4 K/UL (ref 0.3–1)
MONOCYTES NFR BLD: 8.5 % (ref 4–15)
NEUTROPHILS # BLD AUTO: 2.7 K/UL (ref 1.8–7.7)
NEUTROPHILS NFR BLD: 62.4 % (ref 38–73)
NITRITE UR QL STRIP: NEGATIVE
NRBC BLD-RTO: 0 /100 WBC
PH UR STRIP: 8 [PH] (ref 5–8)
PHOSPHATE SERPL-MCNC: 3.8 MG/DL (ref 2.7–4.5)
PLATELET # BLD AUTO: 299 K/UL (ref 150–450)
PMV BLD AUTO: 9.2 FL (ref 9.2–12.9)
POTASSIUM SERPL-SCNC: 4.9 MMOL/L (ref 3.5–5.1)
PROT UR QL STRIP: ABNORMAL
PROT UR-MCNC: 46 MG/DL (ref 0–15)
PROT/CREAT UR: 1.24 MG/G{CREAT} (ref 0–0.2)
RBC # BLD AUTO: 3.36 M/UL (ref 4–5.4)
RBC #/AREA URNS AUTO: 2 /HPF (ref 0–4)
SODIUM SERPL-SCNC: 137 MMOL/L (ref 136–145)
SP GR UR STRIP: 1.01 (ref 1–1.03)
SQUAMOUS #/AREA URNS AUTO: 12 /HPF
URN SPEC COLLECT METH UR: ABNORMAL
WBC # BLD AUTO: 4.33 K/UL (ref 3.9–12.7)
WBC #/AREA URNS AUTO: >100 /HPF (ref 0–5)
WBC CLUMPS UR QL AUTO: ABNORMAL
YEAST UR QL AUTO: ABNORMAL

## 2022-08-24 PROCEDURE — 81001 URINALYSIS AUTO W/SCOPE: CPT | Performed by: NURSE PRACTITIONER

## 2022-08-24 PROCEDURE — 87186 SC STD MICRODIL/AGAR DIL: CPT | Performed by: NURSE PRACTITIONER

## 2022-08-24 PROCEDURE — 87088 URINE BACTERIA CULTURE: CPT | Performed by: NURSE PRACTITIONER

## 2022-08-24 PROCEDURE — 87077 CULTURE AEROBIC IDENTIFY: CPT | Performed by: NURSE PRACTITIONER

## 2022-08-24 PROCEDURE — 84156 ASSAY OF PROTEIN URINE: CPT | Performed by: NURSE PRACTITIONER

## 2022-08-24 PROCEDURE — 85025 COMPLETE CBC W/AUTO DIFF WBC: CPT | Performed by: NURSE PRACTITIONER

## 2022-08-24 PROCEDURE — 87086 URINE CULTURE/COLONY COUNT: CPT | Performed by: NURSE PRACTITIONER

## 2022-08-24 PROCEDURE — 80069 RENAL FUNCTION PANEL: CPT | Performed by: NURSE PRACTITIONER

## 2022-08-25 ENCOUNTER — PATIENT MESSAGE (OUTPATIENT)
Dept: NEPHROLOGY | Facility: CLINIC | Age: 77
End: 2022-08-25
Payer: MEDICARE

## 2022-08-25 ENCOUNTER — EXTERNAL HOME HEALTH (OUTPATIENT)
Dept: HOME HEALTH SERVICES | Facility: HOSPITAL | Age: 77
End: 2022-08-25
Payer: MEDICARE

## 2022-08-26 ENCOUNTER — TELEPHONE (OUTPATIENT)
Dept: NEPHROLOGY | Facility: CLINIC | Age: 77
End: 2022-08-26
Payer: MEDICARE

## 2022-08-26 DIAGNOSIS — N18.4 CHRONIC KIDNEY DISEASE, STAGE 4 (SEVERE): Primary | ICD-10-CM

## 2022-08-26 NOTE — TELEPHONE ENCOUNTER
Jody Murray, HERMES Stone MA  Caller: Unspecified (Today,  3:46 PM)  Okay. Pls make sure she gets schedule for f/u soon. I would also like to repeat CBC and BMP next week. Pls. I think she uses HH

## 2022-08-27 LAB — BACTERIA UR CULT: ABNORMAL

## 2022-08-29 RX ORDER — NIFEDIPINE 60 MG/1
TABLET, EXTENDED RELEASE ORAL
Qty: 90 TABLET | Refills: 0 | OUTPATIENT
Start: 2022-08-29

## 2022-08-29 NOTE — TELEPHONE ENCOUNTER
No new care gaps identified.  [facility] Embedded Care Gaps. Reference number: [id number]. 8/29/2022   8:42:21 AM KRISTENT

## 2022-08-29 NOTE — TELEPHONE ENCOUNTER
Refill Routing Note   Medication(s) are not appropriate for processing by [facility] for the following reason(s):      - Medication not previously prescribed by PCP    [facility] action(s):  Route          Medication reconciliation completed: No     Appointments  past 12m or future 3m with PCP    Date Provider   Last Visit   6/30/2022 Deedee Calderon MD   Next Visit   10/5/2022 Deedee Calderon MD   ED visits in past 90 days: 0        Note composed:2:38 PM 08/29/2022

## 2022-08-31 DIAGNOSIS — Z78.0 MENOPAUSE: ICD-10-CM

## 2022-09-08 ENCOUNTER — HOSPITAL ENCOUNTER (OUTPATIENT)
Dept: WOUND CARE | Facility: HOSPITAL | Age: 77
Discharge: HOME | End: 2022-09-08
Attending: SURGERY
Payer: MEDICARE

## 2022-09-08 VITALS — DIASTOLIC BLOOD PRESSURE: 77 MMHG | HEART RATE: 71 BPM | SYSTOLIC BLOOD PRESSURE: 157 MMHG

## 2022-09-08 DIAGNOSIS — I87.2 VENOUS REFLUX: ICD-10-CM

## 2022-09-08 DIAGNOSIS — L97.929 VENOUS ULCER OF BOTH LOWER EXTREMITIES WITH VARICOSE VEINS: ICD-10-CM

## 2022-09-08 DIAGNOSIS — I89.0 LYMPHEDEMA OF BOTH LOWER EXTREMITIES: ICD-10-CM

## 2022-09-08 DIAGNOSIS — L97.919 VENOUS ULCER OF BOTH LOWER EXTREMITIES WITH VARICOSE VEINS: ICD-10-CM

## 2022-09-08 DIAGNOSIS — I83.019 VENOUS ULCER OF BOTH LOWER EXTREMITIES WITH VARICOSE VEINS: ICD-10-CM

## 2022-09-08 DIAGNOSIS — E11.42 TYPE 2 DIABETES MELLITUS WITH DIABETIC POLYNEUROPATHY, WITH LONG-TERM CURRENT USE OF INSULIN: ICD-10-CM

## 2022-09-08 DIAGNOSIS — I83.029 VENOUS ULCER OF BOTH LOWER EXTREMITIES WITH VARICOSE VEINS: ICD-10-CM

## 2022-09-08 DIAGNOSIS — I87.2 VENOUS INSUFFICIENCY OF BOTH LOWER EXTREMITIES: Primary | ICD-10-CM

## 2022-09-08 DIAGNOSIS — T23.021A: ICD-10-CM

## 2022-09-08 DIAGNOSIS — E66.01 OBESITY, MORBID (MORE THAN 100 LBS OVER IDEAL WEIGHT OR BMI > 40): ICD-10-CM

## 2022-09-08 DIAGNOSIS — Z79.4 TYPE 2 DIABETES MELLITUS WITH DIABETIC POLYNEUROPATHY, WITH LONG-TERM CURRENT USE OF INSULIN: ICD-10-CM

## 2022-09-08 PROCEDURE — 29581 APPL MULTLAYER CMPRN SYS LEG: CPT

## 2022-09-08 NOTE — PROGRESS NOTES
[facility]    Subjective:       Patient ID: Sherin Ortiz is a 77 y.o. female.    Chief Complaint: Non-healing Wound Follow Up    9/8/22:  F/U with Dr. Perry for BLE wounds.  Patient has a wound on her right 3rd finger she says is burns from removing food from the microwave a little while ago.  Instructed patient to go to [facility] for Circaid measurements (since they will not accept our measurements).  Bring Circaid stockings to next clinic appointment.  Verbalized understanding.  Care tolerated well.      Review of Systems   All systems were reviewed and are negative, except that mentioned in the HPI.    Objective:      Physical Exam  Constitutional:       Appearance: She is well-developed.   HENT:      Head: Normocephalic and atraumatic.   Eyes:      Pupils: Pupils are equal, round, and reactive to light.   Cardiovascular:      Rate and Rhythm: Normal rate.   Pulmonary:      Effort: Pulmonary effort is normal. No respiratory distress.      Breath sounds: No stridor.   Abdominal:      General: There is no distension.   Musculoskeletal:      Cervical back: Normal range of motion.   Skin:     Comments: See Synopsis for wound details   Neurological:      Mental Status: She is alert and oriented to person, place, and time.          Altered Skin Integrity 09/08/22 1352 Right Finger, third Traumatic Partial thickness tissue loss. Shallow open ulcer with a red or pink wound bed, without slough. Intact or Open/Ruptured Serum-filled blister. (Active)   09/08/22 1352   Altered Skin Integrity Present on Admission: yes   Side: Right   Orientation:    Location: Finger, third   Wound Number:    Is this injury device related?:    Primary Wound Type: Traumatic   Description of Altered Skin Integrity: Partial thickness tissue loss. Shallow open ulcer with a red or pink wound bed, without slough. Intact or Open/Ruptured Serum-filled blister.   Ankle-Brachial Index:    Pulses:     Removal Indication and Assessment:    Wound Outcome:    (Retired) Wound Length (cm):    (Retired) Wound Width (cm):    (Retired) Depth (cm):    Wound Description (Comments):    Removal Indications:    Wound Image   09/08/22 1300   Description of Altered Skin Integrity Partial thickness tissue loss. Shallow open ulcer with a red or pink wound bed, without slough. Intact or Open/Ruptured Serum-filled blister. 09/08/22 1300   Dressing Appearance Open to air 09/08/22 1300   Drainage Amount None 09/08/22 1300   Appearance Dry;Red 09/08/22 1300   Tissue loss description Partial thickness 09/08/22 1300   Red (%), Wound Tissue Color 100 % 09/08/22 1300   Periwound Area Dry 09/08/22 1300   Wound Edges Callused 09/08/22 1300   Wound Length (cm) 0.5 cm 09/08/22 1300   Wound Width (cm) 0.5 cm 09/08/22 1300   Wound Depth (cm) 0.1 cm 09/08/22 1300   Wound Volume (cm^3) 0.025 cm^3 09/08/22 1300   Wound Surface Area (cm^2) 0.25 cm^2 09/08/22 1300   Care Cleansed with:;Sterile normal saline 09/08/22 1300   Dressing Applied;Rolled gauze;Hydrofiber 09/08/22 1300   Periwound Care Dry periwound area maintained 09/08/22 1300   Dressing Change Due 09/15/22 09/08/22 1300            Wound 08/18/22 1400 Other (comment) Left lower Leg (Active)   08/18/22 1400    Pre-existing: Yes   Primary Wound Type: Other   Side: Left   Orientation: lower   Location: Leg   Wound Number:    Ankle-Brachial Index:    Pulses: doppler   Removal Indication and Assessment:    Wound Outcome:    (Retired) Wound Type:    (Retired) Wound Length (cm):    (Retired) Wound Width (cm):    (Retired) Depth (cm):    Wound Description (Comments):    Removal Indications:    Wound Image   09/08/22 1300   Dressing Appearance Intact;Dry;Clean 09/08/22 1300   Drainage Amount None 09/08/22 1300   Appearance Intact 09/08/22 1300   Periwound Area Edematous 09/08/22 1300   Care Cleansed with:;Soap and water 09/08/22 1300   Compression Two layer compression 09/08/22 1300   Dressing  Change Due 09/15/22 09/08/22 1300            Wound 08/18/22 1400 Other (comment) Right lower Leg (Active)   08/18/22 1400    Pre-existing: Yes   Primary Wound Type: Other   Side: Right   Orientation: lower   Location: Leg   Wound Number:    Ankle-Brachial Index:    Pulses: doppler   Removal Indication and Assessment:    Wound Outcome:    (Retired) Wound Type:    (Retired) Wound Length (cm):    (Retired) Wound Width (cm):    (Retired) Depth (cm):    Wound Description (Comments):    Removal Indications:    Wound Image   09/08/22 1300   Dressing Appearance Intact;Moist drainage 09/08/22 1300   Drainage Amount Small 09/08/22 1300   Drainage Characteristics/Odor Bleeding controlled 09/08/22 1300   Appearance Red;Moist 09/08/22 1300   Tissue loss description Partial thickness 09/08/22 1300   Red (%), Wound Tissue Color 100 % 09/08/22 1300   Periwound Area Edematous;[place] 09/08/22 1300   Wound Edges Irregular 09/08/22 1300   Care Cleansed with:;Sterile normal saline;Soap and water 09/08/22 1300   Dressing Applied;Non-adherent;Absorptive Pad;Compression wrap 09/08/22 1300   Periwound Care Absorptive dressing applied 09/08/22 1300   Compression Two layer compression 09/08/22 1300   Dressing Change Due 09/15/22 09/08/22 1300            Wound 08/18/22 1400 Diabetic Ulcer Left Toe, second (Active)   08/18/22 1400    Pre-existing: Yes   Primary Wound Type: Diabetic ulc   Side: Left   Orientation:    Location: Toe, second   Wound Number:    Ankle-Brachial Index:    Pulses:    Removal Indication and Assessment:    Wound Outcome:    (Retired) Wound Type:    (Retired) Wound Length (cm):    (Retired) Wound Width (cm):    (Retired) Depth (cm):    Wound Description (Comments):    Removal Indications:    Wound Image   09/08/22 1300   Dressing Appearance Intact;Dried drainage 09/08/22 1300   Drainage Amount Scant 09/08/22 1300   Drainage Characteristics/Odor Serosanguineous 09/08/22 1300   Appearance Red;Dry 09/08/22 1300   Tissue loss  description Partial thickness 09/08/22 1300   Red (%), Wound Tissue Color 100 % 09/08/22 1300   Periwound Area Dry 09/08/22 1300   Wound Edges Callused 09/08/22 1300   Wound Length (cm) 0.5 cm 09/08/22 1300   Wound Width (cm) 0.5 cm 09/08/22 1300   Wound Depth (cm) 0.2 cm 09/08/22 1300   Wound Volume (cm^3) 0.05 cm^3 09/08/22 1300   Wound Surface Area (cm^2) 0.25 cm^2 09/08/22 1300   Care Cleansed with:;Sterile normal saline 09/08/22 1300   Dressing Applied;Changed;Hydrofiber;Rolled gauze 09/08/22 1300   Periwound Care Absorptive dressing applied 09/08/22 1300   Dressing Change Due 09/15/22 09/08/22 1300         Assessment:         ICD-10-CM ICD-9-CM   1. Venous insufficiency of both lower extremities  I87.2 459.81   2. Venous ulcer of both lower extremities with varicose veins  I83.019 454.0    I83.029     L97.919     L97.929    3. Type 2 diabetes mellitus with diabetic polyneuropathy, with long-term current use of insulin  E11.42 250.60    Z79.4 357.2     V58.67   4. Venous reflux  I87.2 459.81   5. Lymphedema of both lower extremities  I89.0 457.1   6. Obesity, morbid (more than 100 lbs over ideal weight or BMI > 40)  E66.01 278.01   7. Burn of finger of right hand, initial encounter  T23.021A 944.01         Plan:   Tissue pathology and/or culture taken:  [] Yes [x] No   Sharp debridement performed:   [] Yes [x] No   Labs ordered this visit:   [] Yes [x] No   Imaging ordered this visit:   [] Yes [x] No           Orders Placed This Encounter   Procedures    Change dressing     Right and left lower legs:   Cleanse wound with: Normal Saline   Lidocaine:PRN   Primary dressing: Xeroform to any open wounds   Secondary dressing: small abd pad, small abd or moses foam to Left ankle, Co-flex with calamine BLE toes to knee   Edema control: Co-flex with calamine to BLE toes to knee, protect bilateral toes with small abd pad     Left 2nd toe:   Cleanse with: Normal saline   Primary dressing: Hydrofera ready   Secondary  dressing: conform rolled gauze, flexinet, mefix tape   Frequency: Thursdays     Right 3rd finger  Cleanse with: Normal saline   Primary dressing: Hydrofera ready   Secondary dressing: conform rolled gauze, flexinet, mefix tape   Frequency: Thursdays     Follow-up:  Dr. Perry Thursday 9/29/22     Home Health: [facility]: Orders as above. Change dressings Thursdays and prn.     Other Orders: Protect bilateral toes with small abd pad. [facility] has Circ Aid order and should call to arrange measurements.  Pt has their phone number as well.        Follow up in about 3 weeks (around 9/29/2022).

## 2022-09-09 ENCOUNTER — OFFICE VISIT (OUTPATIENT)
Dept: NEPHROLOGY | Facility: CLINIC | Age: 77
End: 2022-09-09
Payer: MEDICARE

## 2022-09-09 ENCOUNTER — LAB VISIT (OUTPATIENT)
Dept: LAB | Facility: HOSPITAL | Age: 77
End: 2022-09-09
Payer: MEDICARE

## 2022-09-09 VITALS
SYSTOLIC BLOOD PRESSURE: 162 MMHG | HEART RATE: 65 BPM | WEIGHT: 270 LBS | HEIGHT: 67 IN | OXYGEN SATURATION: 99 % | BODY MASS INDEX: 42.38 KG/M2 | DIASTOLIC BLOOD PRESSURE: 82 MMHG

## 2022-09-09 DIAGNOSIS — R80.9 PROTEINURIA, UNSPECIFIED TYPE: ICD-10-CM

## 2022-09-09 DIAGNOSIS — N39.0 RECURRENT UTI: ICD-10-CM

## 2022-09-09 DIAGNOSIS — E11.22 TYPE 2 DIABETES MELLITUS WITH STAGE 4 CHRONIC KIDNEY DISEASE, UNSPECIFIED WHETHER LONG TERM INSULIN USE: ICD-10-CM

## 2022-09-09 DIAGNOSIS — N18.4 CHRONIC KIDNEY DISEASE, STAGE 4 (SEVERE): ICD-10-CM

## 2022-09-09 DIAGNOSIS — N18.4 TYPE 2 DIABETES MELLITUS WITH STAGE 4 CHRONIC KIDNEY DISEASE, UNSPECIFIED WHETHER LONG TERM INSULIN USE: ICD-10-CM

## 2022-09-09 DIAGNOSIS — N25.81 SECONDARY HYPERPARATHYROIDISM: ICD-10-CM

## 2022-09-09 DIAGNOSIS — I10 HYPERTENSION, UNSPECIFIED TYPE: ICD-10-CM

## 2022-09-09 DIAGNOSIS — D64.9 ANEMIA, UNSPECIFIED TYPE: ICD-10-CM

## 2022-09-09 DIAGNOSIS — N18.4 CHRONIC KIDNEY DISEASE, STAGE 4 (SEVERE): Primary | ICD-10-CM

## 2022-09-09 DIAGNOSIS — E87.5 HYPERKALEMIA: ICD-10-CM

## 2022-09-09 LAB
ALBUMIN SERPL BCP-MCNC: 2.9 G/DL (ref 3.5–5.2)
ANION GAP SERPL CALC-SCNC: 8 MMOL/L (ref 8–16)
BASOPHILS # BLD AUTO: 0.02 K/UL (ref 0–0.2)
BASOPHILS NFR BLD: 0.5 % (ref 0–1.9)
BUN SERPL-MCNC: 37 MG/DL (ref 8–23)
CALCIUM SERPL-MCNC: 9.4 MG/DL (ref 8.7–10.5)
CHLORIDE SERPL-SCNC: 106 MMOL/L (ref 95–110)
CO2 SERPL-SCNC: 24 MMOL/L (ref 23–29)
CREAT SERPL-MCNC: 1.8 MG/DL (ref 0.5–1.4)
DIFFERENTIAL METHOD: ABNORMAL
EOSINOPHIL # BLD AUTO: 0.1 K/UL (ref 0–0.5)
EOSINOPHIL NFR BLD: 2 % (ref 0–8)
ERYTHROCYTE [DISTWIDTH] IN BLOOD BY AUTOMATED COUNT: 13.2 % (ref 11.5–14.5)
EST. GFR  (NO RACE VARIABLE): 28.3 ML/MIN/1.73 M^2
GLUCOSE SERPL-MCNC: 87 MG/DL (ref 70–110)
HCT VFR BLD AUTO: 29.6 % (ref 37–48.5)
HGB BLD-MCNC: 9.5 G/DL (ref 12–16)
IMM GRANULOCYTES # BLD AUTO: 0.01 K/UL (ref 0–0.04)
IMM GRANULOCYTES NFR BLD AUTO: 0.2 % (ref 0–0.5)
LYMPHOCYTES # BLD AUTO: 1 K/UL (ref 1–4.8)
LYMPHOCYTES NFR BLD: 25.4 % (ref 18–48)
MCH RBC QN AUTO: 27.5 PG (ref 27–31)
MCHC RBC AUTO-ENTMCNC: 32.1 G/DL (ref 32–36)
MCV RBC AUTO: 86 FL (ref 82–98)
MONOCYTES # BLD AUTO: 0.3 K/UL (ref 0.3–1)
MONOCYTES NFR BLD: 8.4 % (ref 4–15)
NEUTROPHILS # BLD AUTO: 2.6 K/UL (ref 1.8–7.7)
NEUTROPHILS NFR BLD: 63.5 % (ref 38–73)
NRBC BLD-RTO: 0 /100 WBC
PHOSPHATE SERPL-MCNC: 4.2 MG/DL (ref 2.7–4.5)
PLATELET # BLD AUTO: 308 K/UL (ref 150–450)
PMV BLD AUTO: 9.6 FL (ref 9.2–12.9)
POTASSIUM SERPL-SCNC: 4.5 MMOL/L (ref 3.5–5.1)
RBC # BLD AUTO: 3.46 M/UL (ref 4–5.4)
SODIUM SERPL-SCNC: 138 MMOL/L (ref 136–145)
WBC # BLD AUTO: 4.05 K/UL (ref 3.9–12.7)

## 2022-09-09 PROCEDURE — 85025 COMPLETE CBC W/AUTO DIFF WBC: CPT | Performed by: NURSE PRACTITIONER

## 2022-09-09 PROCEDURE — 1126F AMNT PAIN NOTED NONE PRSNT: CPT | Mod: CPTII,S$GLB,, | Performed by: NURSE PRACTITIONER

## 2022-09-09 PROCEDURE — 36415 COLL VENOUS BLD VENIPUNCTURE: CPT | Performed by: NURSE PRACTITIONER

## 2022-09-09 PROCEDURE — 80069 RENAL FUNCTION PANEL: CPT | Performed by: NURSE PRACTITIONER

## 2022-09-09 PROCEDURE — 3077F PR MOST RECENT SYSTOLIC BLOOD PRESSURE >= 140 MM HG: ICD-10-PCS | Mod: CPTII,S$GLB,, | Performed by: NURSE PRACTITIONER

## 2022-09-09 PROCEDURE — 1160F PR REVIEW ALL MEDS BY PRESCRIBER/CLIN PHARMACIST DOCUMENTED: ICD-10-PCS | Mod: CPTII,S$GLB,, | Performed by: NURSE PRACTITIONER

## 2022-09-09 PROCEDURE — 99499 RISK ADDL DX/OHS AUDIT: ICD-10-PCS | Mod: HCNC,S$GLB,, | Performed by: NURSE PRACTITIONER

## 2022-09-09 PROCEDURE — 1160F RVW MEDS BY RX/DR IN RCRD: CPT | Mod: CPTII,S$GLB,, | Performed by: NURSE PRACTITIONER

## 2022-09-09 PROCEDURE — 99999 PR PBB SHADOW E&M-EST. PATIENT-LVL IV: ICD-10-PCS | Mod: PBBFAC,,, | Performed by: NURSE PRACTITIONER

## 2022-09-09 PROCEDURE — 99499 UNLISTED E&M SERVICE: CPT | Mod: HCNC,S$GLB,, | Performed by: NURSE PRACTITIONER

## 2022-09-09 PROCEDURE — 99999 PR PBB SHADOW E&M-EST. PATIENT-LVL IV: CPT | Mod: PBBFAC,,, | Performed by: NURSE PRACTITIONER

## 2022-09-09 PROCEDURE — 1159F PR MEDICATION LIST DOCUMENTED IN MEDICAL RECORD: ICD-10-PCS | Mod: CPTII,S$GLB,, | Performed by: NURSE PRACTITIONER

## 2022-09-09 PROCEDURE — 3079F DIAST BP 80-89 MM HG: CPT | Mod: CPTII,S$GLB,, | Performed by: NURSE PRACTITIONER

## 2022-09-09 PROCEDURE — 3077F SYST BP >= 140 MM HG: CPT | Mod: CPTII,S$GLB,, | Performed by: NURSE PRACTITIONER

## 2022-09-09 PROCEDURE — 3288F PR FALLS RISK ASSESSMENT DOCUMENTED: ICD-10-PCS | Mod: CPTII,S$GLB,, | Performed by: NURSE PRACTITIONER

## 2022-09-09 PROCEDURE — 99215 PR OFFICE/OUTPT VISIT, EST, LEVL V, 40-54 MIN: ICD-10-PCS | Mod: S$GLB,,, | Performed by: NURSE PRACTITIONER

## 2022-09-09 PROCEDURE — 1101F PR PT FALLS ASSESS DOC 0-1 FALLS W/OUT INJ PAST YR: ICD-10-PCS | Mod: CPTII,S$GLB,, | Performed by: NURSE PRACTITIONER

## 2022-09-09 PROCEDURE — 1126F PR PAIN SEVERITY QUANTIFIED, NO PAIN PRESENT: ICD-10-PCS | Mod: CPTII,S$GLB,, | Performed by: NURSE PRACTITIONER

## 2022-09-09 PROCEDURE — 3079F PR MOST RECENT DIASTOLIC BLOOD PRESSURE 80-89 MM HG: ICD-10-PCS | Mod: CPTII,S$GLB,, | Performed by: NURSE PRACTITIONER

## 2022-09-09 PROCEDURE — 1101F PT FALLS ASSESS-DOCD LE1/YR: CPT | Mod: CPTII,S$GLB,, | Performed by: NURSE PRACTITIONER

## 2022-09-09 PROCEDURE — 3288F FALL RISK ASSESSMENT DOCD: CPT | Mod: CPTII,S$GLB,, | Performed by: NURSE PRACTITIONER

## 2022-09-09 PROCEDURE — 1159F MED LIST DOCD IN RCRD: CPT | Mod: CPTII,S$GLB,, | Performed by: NURSE PRACTITIONER

## 2022-09-09 PROCEDURE — 99215 OFFICE O/P EST HI 40 MIN: CPT | Mod: S$GLB,,, | Performed by: NURSE PRACTITIONER

## 2022-09-09 NOTE — PROGRESS NOTES
Subjective:       Patient ID: Shrein Ortiz is a 77 y.o. white female who presents for follow-up evaluation of   No chief complaint on file.      HPI     Last seen by me in April 2021. Last seen by Dr. Choi in January 2018.      Patient presents for f/u of CKD.  Baseline creatinine very labile. Appears to be around 1.4 -1.6 mg/dL. No new labs, though she had an LOREN during an admission in May. Denies NSAIDs.    Hospitalized May 2021 c pyelo for IV antibiotics and hypertensive emergency.  Had heel fracture at some point since Jan. 2021.    Significant hx includes  uncontrolled DM x 50 yrs with diabetic neuropathy and proliferative diabetic retinopathy (laser surgery), morbid obesity, HTN, diastolic dysfunction (?). She also has a hx of kidney stones, she had an episode in January 2017 with right sided hydronephrosis and intervention (passed when stent was placed).     Significant family hx includes: no known renal disease, though son-in-law has ESRD    Last renal US: March 2019, reviewed. No hydronephrosis.    Update 11/15/21:  Last seen by me in July 2021.  Presents for f/u of CKD.  Baseline sCr 1.4-1.6 mg/dL.  Denies NSAID use, recent hospitalizations.   Home BPs: says its SBP in 120s per home health    Update 2/8/22:  Presents for f/u of CKD.  Baseline sCr 1.4-1.6 mg/dL  Admitted in January with pyelonephritis and prescribed ceftriaxone and discharged on cefpodoxime. Urine culture + for klebsiella, enterobacter and proteus. Had LOREN c sCr of 2.2 --> 1.9 (after 500 cc LR bolus) --> 1.7 --> 2.1. Said she did not have UTI symptoms prior to the pyelonephritis.    Home BPs: does not remember; says it is the same as last time she sent BP records from home health    Denies NSAIDs.    Update 5/17/22:  Presents for f/u of CKD.  Baseline was sCr 1.4-1.6 mg/dL. Appears to have stabilized at 1.8-1.9 mg/dL since LOREN in January 2022.  Currently on cefdinir for UTI.  Has had Covid since last visit.  Home BPs: 120s/ cannot  "remember DBP  Denies NSAIDs. Denies recent hospitalizations.    Update 9/9/22:  Presents for f/u of CKD.  Baseline was sCr 1.4-1.6 mg/dL. Appears to have stabilized at 1.8-1.9 mg/dL since LOREN in January 2022.  Admitted in hospital twice in July 2022 with GIB. Hgb down to 5.3. Discharged to SNF after second admission.  Recent LOREN vs expected rise in sCr post starting ACEi c sCr of 2.4 mg/dL. Unclear cause; it occurred after discharge from hospital, and Hgb had already returned to 9.4. Repeat labs were never drawn.  Home BPs: has not been taking per self. Says home health has been getting 120s. Says she has elevated pressures at the hospital/ doctor.    She also had an episode of asymptomatic bacteriuria. Discussed with urology team managing frequent UTIs. They recommended not treating if asymptomatic.    Nifedipine switched to lisionpril in July (7/28/22?). Patient plans to call and confirm.  Now on PPI.  Denies NSAID use.      Review of Systems   Respiratory:  Positive for shortness of breath (only with "strenous" activity, like taking closthes out the dryer).    Cardiovascular:  Positive for leg swelling.   Gastrointestinal:  Negative for diarrhea, nausea and vomiting.   Genitourinary:  Negative for difficulty urinating, dysuria, frequency, hematuria and urgency.        "usually get infections all the time, but I never know I have them"     Objective:       Blood pressure (!) 162/82, pulse 65, height 5' 7" (1.702 m), weight 122.5 kg (270 lb), SpO2 99 %.  Physical Exam  Constitutional:       General: She is not in acute distress.     Appearance: Normal appearance. She is well-developed. She is obese. She is not ill-appearing, toxic-appearing or diaphoretic.   Cardiovascular:      Rate and Rhythm: Normal rate and regular rhythm.   Pulmonary:      Effort: Pulmonary effort is normal. No respiratory distress.      Breath sounds: Normal breath sounds. No wheezing or rales.   Abdominal:      Tenderness: There is no right " CVA tenderness or left CVA tenderness.   Musculoskeletal:      Cervical back: Neck supple.      Right lower leg: Right lower leg edema: CARMELO legs wrapped with dressings.      Left lower leg: Left lower leg edema:  CARMELO legs wrapped with dressings.   Neurological:      Mental Status: She is alert and oriented to person, place, and time.   Psychiatric:         Mood and Affect: Mood normal.         Behavior: Behavior normal.         Thought Content: Thought content normal.         Judgment: Judgment normal.         Lab Results   Component Value Date    CREATININE 2.4 (H) 08/24/2022    URICACID 13.9 (H) 12/03/2020     Prot/Creat Ratio, Urine   Date Value Ref Range Status   08/24/2022 1.24 (H) 0.00 - 0.20 Final   05/06/2022 2.54 (H) 0.00 - 0.20 Final   02/08/2022 1.53 (H) 0.00 - 0.20 Final     Lab Results   Component Value Date     08/24/2022    K 4.9 08/24/2022    CO2 24 08/24/2022     08/24/2022     Lab Results   Component Value Date    .9 (H) 05/13/2022    CALCIUM 9.4 08/24/2022    CAION 1.22 12/15/2012    PHOS 3.8 08/24/2022     Lab Results   Component Value Date    HGB 9.4 (L) 08/24/2022    WBC 4.33 08/24/2022    HCT 29.2 (L) 08/24/2022      Lab Results   Component Value Date    HGBA1C 8.4 (H) 06/30/2022     08/24/2022    BUN 30 (H) 08/24/2022     Lab Results   Component Value Date    LDLCALC 104.8 06/30/2022         Assessment:       1. Chronic kidney disease, stage 4 (severe)    2. Proteinuria, unspecified type    3. Secondary hyperparathyroidism    4. Anemia, unspecified type    5. Hypertension, unspecified type    6. Type 2 diabetes mellitus with stage 4 chronic kidney disease, unspecified whether long term insulin use    7. Recurrent UTI    8. Hyperkalemia          Plan:   CKD stage 3B/4 c eGFR 30 mL/min c superimposed LOREN vs expected rise in sCr post starting ACEi   - Clinically secondary to diabetic nephropathy (also has retinopathy and neuropathy). However, she also has had evidence  of hydronephrosis in the past in an ultrasound with resolution in a follow up CT in 12/17.  Needs repeat labs to determine stability/resolution of LOREN. Educated patient to control BP, BG, weight loss, remain well-hydrated, and avoid NSAIDs to prevent progression of CKD.    UPCR UPCR has proteinuria in setting of asymptomatic bacteriuria. H/o hyperkalemia.  Now on ACEi and K is okay.    No SGLT2i due to frequent UTIs.  No finerenone due to hyperkalemia.   Acid-base WNL   Renal osteodystrophy Ca, phos okay. PTH elevated. On ergocalciferol weekly   Anemia Hgb below goal but trending up. Recent GIB.   DM Poorly controlled. No recent A1c.   Lipid Management On statin.   ESRD planning Anticipatory guidance provided about timing of dialysis. Start discussions and planning when eGFR is about 20 mL/min; most patients start dialysis between 5-10 mL/min.    Daughter's fiance is progressing toward ESRD.       HTN - WNL at home on lisinopril 5 mg  - Reports white coat syndrome  - Consider switching CCB to ARB due to proteinuria. Will likely need K-binder.    Hyperkalemia - WNL recently despite starting ACEi. Continue low k diet. See dietician.    Frequent UTIs- Continue to follow with urology.    All questions patient had were answered.  Asked if further questions. None. F/u in clinic in 3 mos with labs and urine prior to next visit or sooner if needed.  ER for emergency concerns.    Summary of Plan:  1.  Refer to dietician  2. CBC, RFP  3. avoid NSAID/ bactrim/ IV contrast/ gadolinium/ aminoglycoside where possible  4. Intro to CKD class  5. Advanced CKD class depending on today's lab results  6. RTC in 3 mos

## 2022-09-13 ENCOUNTER — TELEPHONE (OUTPATIENT)
Dept: NEPHROLOGY | Facility: CLINIC | Age: 77
End: 2022-09-13
Payer: MEDICARE

## 2022-09-20 ENCOUNTER — TELEPHONE (OUTPATIENT)
Dept: NEPHROLOGY | Facility: CLINIC | Age: 77
End: 2022-09-20
Payer: MEDICARE

## 2022-09-29 ENCOUNTER — HOSPITAL ENCOUNTER (OUTPATIENT)
Dept: WOUND CARE | Facility: HOSPITAL | Age: 77
Discharge: HOME | End: 2022-09-29
Attending: SURGERY
Payer: MEDICARE

## 2022-09-29 VITALS
BODY MASS INDEX: 42.38 KG/M2 | HEART RATE: 65 BPM | TEMPERATURE: 98 F | DIASTOLIC BLOOD PRESSURE: 89 MMHG | HEIGHT: 67 IN | WEIGHT: 270 LBS | SYSTOLIC BLOOD PRESSURE: 191 MMHG

## 2022-09-29 DIAGNOSIS — I89.0 LYMPHEDEMA OF BOTH LOWER EXTREMITIES: ICD-10-CM

## 2022-09-29 DIAGNOSIS — L97.929 VENOUS ULCER OF BOTH LOWER EXTREMITIES WITH VARICOSE VEINS: Primary | ICD-10-CM

## 2022-09-29 DIAGNOSIS — E11.42 TYPE 2 DIABETES MELLITUS WITH DIABETIC POLYNEUROPATHY, WITH LONG-TERM CURRENT USE OF INSULIN: ICD-10-CM

## 2022-09-29 DIAGNOSIS — I83.029 VENOUS ULCER OF BOTH LOWER EXTREMITIES WITH VARICOSE VEINS: Primary | ICD-10-CM

## 2022-09-29 DIAGNOSIS — L97.919 VENOUS ULCER OF BOTH LOWER EXTREMITIES WITH VARICOSE VEINS: Primary | ICD-10-CM

## 2022-09-29 DIAGNOSIS — I83.019 VENOUS ULCER OF BOTH LOWER EXTREMITIES WITH VARICOSE VEINS: Primary | ICD-10-CM

## 2022-09-29 DIAGNOSIS — I87.2 VENOUS INSUFFICIENCY OF BOTH LOWER EXTREMITIES: ICD-10-CM

## 2022-09-29 DIAGNOSIS — Z79.4 TYPE 2 DIABETES MELLITUS WITH DIABETIC POLYNEUROPATHY, WITH LONG-TERM CURRENT USE OF INSULIN: ICD-10-CM

## 2022-09-29 PROCEDURE — 29581 APPL MULTLAYER CMPRN SYS LEG: CPT

## 2022-09-29 NOTE — PROGRESS NOTES
[facility]    Subjective:       Patient ID: Sherin Ortiz is a 77 y.o. female.    Chief Complaint: Venous Ulcer    Follow up for ble venous ulcers. Patient states she has not gotten Circaids yet due to having multiple doctor appointments She was not scheduled for a up with  after her procedure in June for the LLE. Future intervention for the RLE is also planned.We will help coordinate f/u with Dr. Farfan.    Review of Systems   All systems were reviewed and are negative, except that mentioned in the HPI.    Objective:      Physical Exam  Constitutional:       Appearance: She is well-developed.   HENT:      Head: Normocephalic and atraumatic.   Eyes:      Pupils: Pupils are equal, round, and reactive to light.   Cardiovascular:      Rate and Rhythm: Normal rate.   Pulmonary:      Effort: Pulmonary effort is normal. No respiratory distress.      Breath sounds: No stridor.   Abdominal:      General: There is no distension.   Musculoskeletal:      Cervical back: Normal range of motion.   Skin:     Comments: See Synopsis for wound details   Neurological:      Mental Status: She is alert and oriented to person, place, and time.          Wound 08/18/22 1400 Other (comment) Left lower Leg (Active)   08/18/22 1400    Pre-existing: Yes   Primary Wound Type: Other   Side: Left   Orientation: lower   Location: Leg   Wound Number:    Ankle-Brachial Index:    Pulses: doppler   Removal Indication and Assessment:    Wound Outcome:    (Retired) Wound Type:    (Retired) Wound Length (cm):    (Retired) Wound Width (cm):    (Retired) Depth (cm):    Wound Description (Comments):    Removal Indications:    Wound Image   09/29/22 1332   Dressing Appearance Intact;Moist drainage 09/29/22 1332   Drainage Amount Scant 09/29/22 1332   Drainage Characteristics/Odor Serosanguineous 09/29/22 1332   Appearance Red;Moist;Other (see comments) 09/29/22 1332   Tissue loss description  Partial thickness 09/29/22 1332   Red (%), Wound Tissue Color 100 % 09/29/22 1332   Periwound Area Edematous 09/29/22 1332   Wound Edges Irregular 09/29/22 1332   Care Cleansed with:;Soap and water 09/29/22 1332   Dressing Applied;Non-adherent;Compression wrap 09/29/22 1332   Periwound Care Absorptive dressing applied;Dry periwound area maintained 09/29/22 1332   Compression Two layer compression 09/29/22 1332   Dressing Change Due 10/03/22 09/29/22 1332            Wound 08/18/22 1400 Other (comment) Right lower Leg (Active)   08/18/22 1400    Pre-existing: Yes   Primary Wound Type: Other   Side: Right   Orientation: lower   Location: Leg   Wound Number:    Ankle-Brachial Index:    Pulses: doppler   Removal Indication and Assessment:    Wound Outcome:    (Retired) Wound Type:    (Retired) Wound Length (cm):    (Retired) Wound Width (cm):    (Retired) Depth (cm):    Wound Description (Comments):    Removal Indications:    Wound Image   09/29/22 1332   Dressing Appearance Intact;Moist drainage 09/29/22 1332   Drainage Amount Scant 09/29/22 1332   Drainage Characteristics/Odor Serosanguineous 09/29/22 1332   Appearance Red;Moist;Other (see comments) 09/29/22 1332   Tissue loss description Partial thickness 09/29/22 1332   Red (%), Wound Tissue Color 100 % 09/29/22 1332   Periwound Area Intact;Dry;Edematous 09/29/22 1332   Wound Edges Irregular 09/29/22 1332   Care Cleansed with:;Soap and water 09/29/22 1332   Dressing Applied;Compression wrap;Non-adherent 09/29/22 1332   Periwound Care Absorptive dressing applied 09/29/22 1332   Compression Two layer compression 09/29/22 1332   Dressing Change Due 10/03/22 09/29/22 1332         Assessment:         ICD-10-CM ICD-9-CM   1. Venous ulcer of both lower extremities with varicose veins  I83.019 454.0    I83.029     L97.919     L97.929    2. Type 2 diabetes mellitus with diabetic polyneuropathy, with long-term current use of insulin  E11.42 250.60    Z79.4 357.2     V58.67    3. Lymphedema of both lower extremities  I89.0 457.1   4. Venous insufficiency of both lower extremities  I87.2 459.81         Plan:   Tissue pathology and/or culture taken:  [] Yes [x] No   Sharp debridement performed:   [] Yes [x] No   Labs ordered this visit:   [] Yes [x] No   Imaging ordered this visit:   [] Yes [x] No           Orders Placed This Encounter   Procedures    Change dressing     Right and left lower legs:   Cleanse wound with: Normal Saline   Lidocaine:PRN   Primary dressing: Xeroform to any open wounds   Secondary dressing: small abd pad, small abd or moses foam to Left ankle, Co-flex with calamine BLE toes to knee   Edema control: Co-flex with calamine to BLE toes to knee, protect bilateral toes with small abd pad     Follow-up:  Dr. Perry Thursday 10/13/22  Home Health: [facility]: Orders as above. Change dressings Thursdays and prn.     Other Orders: Protect bilateral toes with small abd pad. [facility] has Circ Aid order and should call to arrange measurements.  Pt has their phone number as well.  Patient needs to schedule appointment with  for follow up from procedure in June LLE and for possible RLE procedure.        Follow up in about 2 weeks (around 10/13/2022) for  and needs appt with .  Appt made with Dr. Farfan 10/17/22  All questions were answered.

## 2022-10-02 PROCEDURE — G0179 PR HOME HEALTH MD RECERTIFICATION: ICD-10-PCS | Mod: ,,, | Performed by: INTERNAL MEDICINE

## 2022-10-02 PROCEDURE — G0179 MD RECERTIFICATION HHA PT: HCPCS | Mod: ,,, | Performed by: INTERNAL MEDICINE

## 2022-10-05 ENCOUNTER — PATIENT MESSAGE (OUTPATIENT)
Dept: NEPHROLOGY | Facility: CLINIC | Age: 77
End: 2022-10-05
Payer: MEDICARE

## 2022-10-05 DIAGNOSIS — I10 ESSENTIAL HYPERTENSION: Primary | Chronic | ICD-10-CM

## 2022-10-05 RX ORDER — LISINOPRIL 5 MG/1
5 TABLET ORAL DAILY
Qty: 90 TABLET | Refills: 0 | Status: SHIPPED | OUTPATIENT
Start: 2022-10-05 | End: 2022-12-13

## 2022-10-05 NOTE — TELEPHONE ENCOUNTER
----- Message from Omayra Sabillon sent at 10/5/2022  9:40 AM CDT -----  Contact: [phone] Patient  Requesting an RX refill or new RX.  Is this a refill or new RX: new  RX name and strength (copy/paste from chart):  lisinopriL (PRINIVIL,ZESTRIL) 5 MG tablet  Is this a 30 day or 90 day RX: 90  Pharmacy name and phone # (copy/paste from chart):    [facility] - [place], [place] - [address]  [address] [address]  Phone: [phone] Fax: [phone]

## 2022-10-05 NOTE — TELEPHONE ENCOUNTER
No new care gaps identified.  [facility] Embedded Care Gaps. Reference number: [id number]. 10/05/2022   10:22:30 AM CDT

## 2022-10-05 NOTE — TELEPHONE ENCOUNTER
Pt requesting refill     Last enc 9/29/22 [facility] - Wound    Last pcp visit 6/30/22      Order pended  Please review

## 2022-10-06 ENCOUNTER — PATIENT MESSAGE (OUTPATIENT)
Dept: BARIATRICS | Facility: CLINIC | Age: 77
End: 2022-10-06
Payer: MEDICARE

## 2022-10-17 ENCOUNTER — OFFICE VISIT (OUTPATIENT)
Dept: CARDIOLOGY | Facility: CLINIC | Age: 77
End: 2022-10-17
Payer: MEDICARE

## 2022-10-17 ENCOUNTER — HOSPITAL ENCOUNTER (OUTPATIENT)
Dept: WOUND CARE | Facility: HOSPITAL | Age: 77
Discharge: HOME | End: 2022-10-17
Attending: SURGERY
Payer: MEDICARE

## 2022-10-17 VITALS
HEIGHT: 67 IN | TEMPERATURE: 98 F | WEIGHT: 270 LBS | BODY MASS INDEX: 42.38 KG/M2 | DIASTOLIC BLOOD PRESSURE: 65 MMHG | HEART RATE: 64 BPM | SYSTOLIC BLOOD PRESSURE: 147 MMHG

## 2022-10-17 DIAGNOSIS — L97.919 VENOUS ULCER OF BOTH LOWER EXTREMITIES WITH VARICOSE VEINS: Primary | ICD-10-CM

## 2022-10-17 DIAGNOSIS — I83.019 VENOUS ULCER OF BOTH LOWER EXTREMITIES WITH VARICOSE VEINS: ICD-10-CM

## 2022-10-17 DIAGNOSIS — N18.4 STAGE 4 CHRONIC KIDNEY DISEASE: ICD-10-CM

## 2022-10-17 DIAGNOSIS — L97.522 SKIN ULCER OF LEFT GREAT TOE WITH FAT LAYER EXPOSED: ICD-10-CM

## 2022-10-17 DIAGNOSIS — I89.0 LYMPHEDEMA OF BOTH LOWER EXTREMITIES: ICD-10-CM

## 2022-10-17 DIAGNOSIS — L97.929 VENOUS ULCER OF BOTH LOWER EXTREMITIES WITH VARICOSE VEINS: ICD-10-CM

## 2022-10-17 DIAGNOSIS — I87.2 VENOUS REFLUX: ICD-10-CM

## 2022-10-17 DIAGNOSIS — E11.42 TYPE 2 DIABETES MELLITUS WITH DIABETIC POLYNEUROPATHY, WITH LONG-TERM CURRENT USE OF INSULIN: ICD-10-CM

## 2022-10-17 DIAGNOSIS — I50.30 HEART FAILURE WITH PRESERVED EJECTION FRACTION, UNSPECIFIED HF CHRONICITY: ICD-10-CM

## 2022-10-17 DIAGNOSIS — I87.2 VENOUS INSUFFICIENCY OF BOTH LOWER EXTREMITIES: ICD-10-CM

## 2022-10-17 DIAGNOSIS — I83.029 VENOUS ULCER OF BOTH LOWER EXTREMITIES WITH VARICOSE VEINS: ICD-10-CM

## 2022-10-17 DIAGNOSIS — D63.8 ANEMIA OF CHRONIC DISEASE: Chronic | ICD-10-CM

## 2022-10-17 DIAGNOSIS — E66.01 MORBID OBESITY WITH BODY MASS INDEX (BMI) GREATER THAN OR EQUAL TO 50: ICD-10-CM

## 2022-10-17 DIAGNOSIS — I87.2 VENOUS INSUFFICIENCY OF BOTH LOWER EXTREMITIES: Primary | ICD-10-CM

## 2022-10-17 DIAGNOSIS — I83.019 VENOUS ULCER OF BOTH LOWER EXTREMITIES WITH VARICOSE VEINS: Primary | ICD-10-CM

## 2022-10-17 DIAGNOSIS — L97.919 VENOUS ULCER OF BOTH LOWER EXTREMITIES WITH VARICOSE VEINS: ICD-10-CM

## 2022-10-17 DIAGNOSIS — I13.0 BENIGN HYPERTENSIVE HEART AND KIDNEY DISEASE WITH HF AND CKD: ICD-10-CM

## 2022-10-17 DIAGNOSIS — Z79.4 TYPE 2 DIABETES MELLITUS WITH DIABETIC POLYNEUROPATHY, WITH LONG-TERM CURRENT USE OF INSULIN: ICD-10-CM

## 2022-10-17 DIAGNOSIS — R53.81 DEBILITY: Chronic | ICD-10-CM

## 2022-10-17 DIAGNOSIS — L97.929 VENOUS ULCER OF BOTH LOWER EXTREMITIES WITH VARICOSE VEINS: Primary | ICD-10-CM

## 2022-10-17 DIAGNOSIS — I83.029 VENOUS ULCER OF BOTH LOWER EXTREMITIES WITH VARICOSE VEINS: Primary | ICD-10-CM

## 2022-10-17 DIAGNOSIS — I77.9 PAOD (PERIPHERAL ARTERIAL OCCLUSIVE DISEASE): ICD-10-CM

## 2022-10-17 PROCEDURE — 3078F PR MOST RECENT DIASTOLIC BLOOD PRESSURE < 80 MM HG: ICD-10-PCS | Mod: CPTII,S$GLB,, | Performed by: INTERNAL MEDICINE

## 2022-10-17 PROCEDURE — 99215 OFFICE O/P EST HI 40 MIN: CPT | Mod: S$GLB,,, | Performed by: INTERNAL MEDICINE

## 2022-10-17 PROCEDURE — 3077F SYST BP >= 140 MM HG: CPT | Mod: CPTII,S$GLB,, | Performed by: INTERNAL MEDICINE

## 2022-10-17 PROCEDURE — 3077F PR MOST RECENT SYSTOLIC BLOOD PRESSURE >= 140 MM HG: ICD-10-PCS | Mod: CPTII,S$GLB,, | Performed by: INTERNAL MEDICINE

## 2022-10-17 PROCEDURE — 99999 PR PBB SHADOW E&M-EST. PATIENT-LVL II: CPT | Mod: PBBFAC,,, | Performed by: INTERNAL MEDICINE

## 2022-10-17 PROCEDURE — 99499 UNLISTED E&M SERVICE: CPT | Mod: S$GLB,,, | Performed by: INTERNAL MEDICINE

## 2022-10-17 PROCEDURE — 99215 PR OFFICE/OUTPT VISIT, EST, LEVL V, 40-54 MIN: ICD-10-PCS | Mod: S$GLB,,, | Performed by: INTERNAL MEDICINE

## 2022-10-17 PROCEDURE — 3078F DIAST BP <80 MM HG: CPT | Mod: CPTII,S$GLB,, | Performed by: INTERNAL MEDICINE

## 2022-10-17 PROCEDURE — 29581 APPL MULTLAYER CMPRN SYS LEG: CPT

## 2022-10-17 PROCEDURE — 99999 PR PBB SHADOW E&M-EST. PATIENT-LVL II: ICD-10-PCS | Mod: PBBFAC,,, | Performed by: INTERNAL MEDICINE

## 2022-10-17 NOTE — PROGRESS NOTES
[facility]    Subjective:       Patient ID: Sherin Ortiz is a 77 y.o. female.    Chief Complaint: Wound Care    10/17/2022 Follow up with Dr Perry related to right and left lower leg venous ulcers.  No complaints.  Patient has not been able to call about Circ Aids yet.  Dr Farfan ordered bilateral leg venous ultrasound.  Plan of care updated and reviewed with patient.  Orders faxed to home health.  Follow up with Dr Perry in 3 weeks on 11/10/2022.        Review of Systems   All systems were reviewed and are negative, except that mentioned in the HPI.    Objective:      Physical Exam  Constitutional:       Appearance: She is well-developed.   HENT:      Head: Normocephalic and atraumatic.   Eyes:      Pupils: Pupils are equal, round, and reactive to light.   Cardiovascular:      Rate and Rhythm: Normal rate.   Pulmonary:      Effort: Pulmonary effort is normal. No respiratory distress.      Breath sounds: No stridor.   Abdominal:      General: There is no distension.   Musculoskeletal:      Cervical back: Normal range of motion.   Skin:     Comments: See Synopsis for wound details   Neurological:      Mental Status: She is alert and oriented to person, place, and time.          Wound 08/18/22 1400 Other (comment) Left lower Leg (Active)   08/18/22 1400    Pre-existing: Yes   Primary Wound Type: Other   Side: Left   Orientation: lower   Location: Leg   Wound Number:    Ankle-Brachial Index:    Pulses: doppler   Removal Indication and Assessment:    Wound Outcome:    (Retired) Wound Type:    (Retired) Wound Length (cm):    (Retired) Wound Width (cm):    (Retired) Depth (cm):    Wound Description (Comments):    Removal Indications:    Wound Image   10/17/22 1300   Dressing Appearance Intact;Moist drainage 10/17/22 1300   Drainage Amount Moderate 10/17/22 1300   Drainage Characteristics/Odor Serosanguineous 10/17/22 1300   Appearance Red;Moist 10/17/22 1300    Tissue loss description Partial thickness 10/17/22 1300   Red (%), Wound Tissue Color 100 % 10/17/22 1300   Periwound Area Edematous 10/17/22 1300   Wound Edges Undefined 10/17/22 1300   Care Cleansed with:;Soap and water;Sterile normal saline 10/17/22 1300   Dressing Applied;Hydrofiber;Compression wrap;Absorptive Pad 10/17/22 1300   Periwound Care Dry periwound area maintained 10/17/22 1300   Compression Two layer compression 10/17/22 1300   Dressing Change Due 10/20/22 10/17/22 1300            Wound 08/18/22 1400 Other (comment) Right lower Leg (Active)   08/18/22 1400    Pre-existing: Yes   Primary Wound Type: Other   Side: Right   Orientation: lower   Location: Leg   Wound Number:    Ankle-Brachial Index:    Pulses: doppler   Removal Indication and Assessment:    Wound Outcome:    (Retired) Wound Type:    (Retired) Wound Length (cm):    (Retired) Wound Width (cm):    (Retired) Depth (cm):    Wound Description (Comments):    Removal Indications:    Wound Image    10/17/22 1300   Dressing Appearance Intact;Moist drainage 10/17/22 1300   Drainage Amount Moderate 10/17/22 1300   Drainage Characteristics/Odor Serosanguineous 10/17/22 1300   Appearance Red;Moist 10/17/22 1300   Tissue loss description Partial thickness 10/17/22 1300   Red (%), Wound Tissue Color 100 % 10/17/22 1300   Periwound Area Edematous 10/17/22 1300   Wound Edges Undefined 10/17/22 1300   Care Cleansed with:;Soap and water;Sterile normal saline 10/17/22 1300   Dressing Applied;Hydrofiber;Compression wrap;Absorptive Pad 10/17/22 1300   Periwound Care Dry periwound area maintained 10/17/22 1300   Compression Two layer compression 10/17/22 1300   Dressing Change Due 10/20/22 10/17/22 1300         Assessment/Plan:         ICD-10-CM ICD-9-CM   1. Venous ulcer of both lower extremities with varicose veins  I83.019 454.0    I83.029     L97.919     L97.926    2. Type 2 diabetes mellitus with diabetic polyneuropathy, with long-term current use of  insulin  E11.42 250.60    Z79.4 357.2     V58.67   3. Lymphedema of both lower extremities  I89.0 457.1   4. Venous insufficiency of both lower extremities  I87.2 459.81           Tissue pathology and/or culture taken:  [] Yes [x] No   Sharp debridement performed:   [] Yes [x] No   Labs ordered this visit:   [] Yes [x] No   Imaging ordered this visit:   [x] Yes [] No           Orders Placed This Encounter   Procedures    Change dressing     Right and left lower legs:   Cleanse wound with: Normal Saline   Lidocaine:PRN   Primary dressing: Hydrofera blue ready to any open wounds   Secondary dressing: small abd pad, small abd or moses foam to Left ankle, Co-flex with calamine BLE toes to knee   Edema control: Co-flex with calamine to BLE toes to knee, protect bilateral toes with small abd pad     Follow-up:  Dr. Perry Thursday 10/13/22   Home Health: [facility]: Orders as above. Change dressings Thursdays and prn except when in clinic    Other Orders: Protect bilateral toes with small abd pad. [facility] has Circ Aid order and should call to arrange measurements.  Pt has their phone number as well.  Patient needs to schedule appointment with  for follow up from procedure in June LLE and for possible RLE procedure.        Follow up in 3 weeks (on 11/7/2022) for Dr Perry.

## 2022-10-18 VITALS
HEIGHT: 67 IN | DIASTOLIC BLOOD PRESSURE: 67 MMHG | HEART RATE: 64 BPM | WEIGHT: 270 LBS | BODY MASS INDEX: 42.38 KG/M2 | SYSTOLIC BLOOD PRESSURE: 142 MMHG

## 2022-10-18 NOTE — PROGRESS NOTES
Subjective:   Patient ID:  Sherin Ortiz is a 77 y.o. female who presents for management of Venous Insufficiency, Obesity, and Non-healing Wound      HPI:       77-year-old female past medical history of venous insufficiency complicated with ulcerations edema venous claudication, diastolic dysfunction, obesity, diabetes, lymphedema, hyperlipidemia, hypertension, chronic kidney disease, physical debilitation, wheelchair/scooter dependent, and depression presenting by recommendation of care team for management of venous insufficiency. She is s/p ablation of left GSV and sclerotherapy of left medial ankle accessory veins (6/2022)      Venous US 12/2021      R GSV 6.4 mm + reflux 2963 msec   R LSV 3.4 mm + reflux 584 msec     L GSV 6.5 mm + reflux 1256 msec    L LSV to small to visualize       No DVT             CEAP 6  VCSS 12      Ablation of left GSV and sclerotherapy of left medial ankle accessory veins (6/2022)      Patient Active Problem List    Diagnosis Date Noted    ACP (advance care planning) 07/20/2022    GIB (gastrointestinal bleeding) 07/13/2022    Avulsed toenail, initial encounter     Toenail deformity     Venous ulcer, limited to breakdown of skin     Obesity, diabetes, and hypertension syndrome 01/20/2022    Pyelonephritis 01/18/2022    Adrenal nodule 01/18/2022    Obesity, morbid (more than 100 lbs over ideal weight or BMI > 40)     Skin ulcer of left great toe with fat layer exposed     Venous insufficiency of both lower extremities     Venous reflux     Ulcer of great toe, left, limited to breakdown of skin 10/27/2021    Venous ulcer of both lower extremities with varicose veins 09/22/2021    UTI due to extended-spectrum beta lactamase (ESBL) producing Escherichia coli 05/17/2021    Unable to bear weight 01/15/2021    Type 2 diabetes mellitus with hypoglycemia without coma, with long-term current use of insulin 01/14/2021    Closed nondisplaced fracture of left calcaneus 01/13/2021    Osteopenia  "2021    Charcot ankle, left 2021    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  Lt DISHA (1.09) Segment/Brachial Index and PVR waveforms indicate minimal peripheral arterial obstructive disease."  Other Chronic Conditions:  HLD, DM    Medications:  Aspirin 81 mg, Lipi  tor 40 mg      Dermatitis, stasis 10/19/2016    Wheelchair dependent 2015    Type 2 diabetes mellitus with diabetic polyneuropathy, with long-term current use of insulin 2015    Anemia of chronic disease 2015    Hypoalbuminemia 2015    Inability to walk 2015    Morbid obesity with body mass index (BMI) greater than or equal to 50 2015    Stage 4 chronic kidney disease 2013    PSC (posterior subcapsular cataract) - Both Eyes 2013    Nuclear sclerosis - Both Eyes 2013    Asteroid hyalosis - Left Eye 2013    Debility 2012    Essential hypertension 2012    Hyperlipidemia LDL goal <100 2012    Bilateral leg edema 2012           Right Arm BP - Sittin/67  Left Arm BP - Sittin/67        LABS    LAST HbA1c  Lab Results   Component Value Date    HGBA1C 8.4 (H) 2022       Lipid panel  Lab Results   Component Value Date    CHOL 163 2022    CHOL 130 2021    CHOL 127 2020     Lab Results   Component Value Date    HDL 37 (L) 2022    HDL 42 " 03/30/2021    HDL 49 09/24/2020     Lab Results   Component Value Date    LDLCALC 104.8 06/30/2022    LDLCALC 70.2 03/30/2021    LDLCALC 59.8 (L) 09/24/2020     Lab Results   Component Value Date    TRIG 106 06/30/2022    TRIG 89 03/30/2021    TRIG 91 09/24/2020     Lab Results   Component Value Date    CHOLHDL 22.7 06/30/2022    CHOLHDL 32.3 03/30/2021    CHOLHDL 38.6 09/24/2020            Review of Systems   Constitutional: Positive for weight gain. Negative for diaphoresis, night sweats and weight loss.   HENT:  Negative for congestion.    Eyes:  Negative for blurred vision, discharge and double vision.   Cardiovascular:  Positive for leg swelling. Negative for chest pain, claudication, cyanosis, dyspnea on exertion, irregular heartbeat, near-syncope, orthopnea, palpitations, paroxysmal nocturnal dyspnea and syncope.   Respiratory:  Positive for shortness of breath and sleep disturbances due to breathing. Negative for cough and wheezing.    Endocrine: Negative for cold intolerance, heat intolerance and polyphagia.   Hematologic/Lymphatic: Negative for adenopathy and bleeding problem. Does not bruise/bleed easily.   Skin:  Positive for poor wound healing. Negative for dry skin and nail changes.   Musculoskeletal:  Positive for arthritis and muscle weakness. Negative for back pain, falls, joint pain, myalgias and neck pain.   Gastrointestinal:  Negative for bloating, abdominal pain, change in bowel habit and constipation.   Genitourinary:  Negative for bladder incontinence, dysuria, flank pain, genital sores and missed menses.   Neurological:  Negative for aphonia, brief paralysis, difficulty with concentration, dizziness and weakness.   Psychiatric/Behavioral:  Positive for depression. Negative for altered mental status and memory loss. The patient does not have insomnia.    Allergic/Immunologic: Negative for environmental allergies.     Objective:   Physical Exam  Constitutional:       General: She is awake.       Comments: Obese    Uses a scooter        HENT:      Head: Normocephalic and atraumatic.      Jaw: There is normal jaw occlusion.   Eyes:      General: Lids are normal.      Extraocular Movements: Extraocular movements intact.      Conjunctiva/sclera: Conjunctivae normal.   Cardiovascular:      Rate and Rhythm: Regular rhythm. Occasional Extrasystoles are present.     Pulses:           Radial pulses are 2+ on the right side and 2+ on the left side.        Femoral pulses are 2+ on the right side and 2+ on the left side.       Popliteal pulses are 1+ on the right side and 1+ on the left side.         Right dorsalis pedis pulse not accessible and left dorsalis pedis pulse not accessible.         Right posterior tibial pulse not accessible and left posterior tibial pulse not accessible.   Pulmonary:      Effort: Pulmonary effort is normal.      Breath sounds: Normal breath sounds.   Abdominal:      Palpations: Abdomen is soft.   Musculoskeletal:      Cervical back: Full passive range of motion without pain and normal range of motion.   Skin:     Comments:     Venous stasis ulcers     See images    Neurological:      Mental Status: She is alert.   Psychiatric:         Behavior: Behavior is cooperative.       Assessment:     1. Venous insufficiency of both lower extremities    2. PAOD (peripheral arterial occlusive disease)    3. Heart failure with preserved ejection fraction, unspecified HF chronicity    4. 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 Venous reflux    6. Stage 4 chronic kidney disease    7. Anemia of chronic disease    8. Morbid obesity with body mass index (BMI) greater than or equal to 50    9. Skin ulcer of left great toe with fat layer exposed    10. Venous ulcer of both lower extremities with varicose veins    11. Debility        Plan:         She is status post left greater saphenous ablation and sclerotherapy left ankle accessory veins.  She will proceed with for right greater  saphenous and lesser saphenous ablation.  She will have a surveillance ultrasound to determine if there are any residual veins in the left lower extremity with reflux that will require additional treatment.      Although only superficial reflux was mention on the ultrasound report her clinical presentations also concerning for deep venous reflux disease likely due to morbid obesity.  She will ultimately benefit from weight loss.  She will be referred to bariatric surgery clinic to be evaluated for candidacy of weight loss surgery.  Referral to lymphedema clinic to assist with chronic lymphedema after her wounds are completely healed.      Physical therapy evaluate and treat    Treat depression         Continue with current medical plan and lifestyle changes.  Return sooner for concerns or questions. If symptoms persist go to the ED  I have reviewed all pertinent data on this patient       I have reviewed the patient's medical history in detail and updated the computerized patient record.    Orders Placed This Encounter   Procedures    CV US Lower Extremity Veins Bilateral Insufficiency     Standing Status:   Future     Standing Expiration Date:   10/17/2023     Order Specific Question:   Release to patient     Answer:   Immediate    Case Request-Cath Lab: Ablation     Standing Status:   Standing     Number of Occurrences:   1     Order Specific Question:   CPT Code:     Answer:   MT CHEMICAL VEIN ABLATION [[id number]]     Order Specific Question:   Medical Necessity:     Answer:   Medically Urgent [101]     Order Specific Question:   Is an on-site pathologist required for this procedure?     Answer:   N/A       Follow up as scheduled. Return sooner for concerns or questions            She expressed verbal understanding and agreed with the plan    -In today's visit, at least 4 established conditions that pose a risk to life or bodily function have been addressed and the conditions are severe.    -In today's visit,  "monitoring for drug toxicity was accomplished.            Patient's Medications   New Prescriptions    No medications on file   Previous Medications    ACETAMINOPHEN (TYLENOL) 325 MG TABLET    Take 2 tablets (650 mg total) by mouth every 6 (six) hours as needed for Pain.    ASPIRIN 81 MG CHEW    Take 81 mg by mouth once daily.    ATORVASTATIN (LIPITOR) 40 MG TABLET    TAKE 1 TABLET EVERY EVENING    BD INSULIN SYRINGE ULTRA-FINE 0.5 ML 30 GAUGE X 1/2" SYRG    USE TO INJECT EVERY NIGHT    BLOOD GLUCOSE CONTROL, HIGH (TRUE METRIX LEVEL 3) SOLN    Used to calibrate weekly    BLOOD SUGAR DIAGNOSTIC (TRUE METRIX GLUCOSE TEST STRIP) STRP    Test twice daily    CLOPIDOGREL (PLAVIX) 75 MG TABLET    Take 1 tablet (75 mg total) by mouth once daily.    ERGOCALCIFEROL (ERGOCALCIFEROL) 50,000 UNIT CAP    Take 1 capsule (50,000 Units total) by mouth every 7 days. Weekly - 12 weeks, then monthly    INSULIN GLARGINE (LANTUS U-100 INSULIN) 100 UNIT/ML INJECTION    Inject 10-15 Units into the skin every evening. DEPENDING ON SCALE (DISCARD EACH VIAL AFTER 28 DAYS)    INSULIN SYR/NDL U100 HALF CARLA (DROPLET INSULIN SYR HALF UNIT) 0.5 ML 30 GAUGE X 1/2" SYRG    USE TO INJECT EVERY NIGHT    LANCETS (TRUEPLUS LANCETS) 33 GAUGE MISC    1 lancet by Misc.(Non-Drug; Combo Route) route 2 (two) times a day.    NIFEDIPINE (PROCARDIA-XL) 60 MG (OSM) 24 HR TABLET    Take 1 tablet (60 mg total) by mouth once daily.   Modified Medications    No medications on file   Discontinued Medications       "

## 2022-10-27 ENCOUNTER — EXTERNAL HOME HEALTH (OUTPATIENT)
Dept: HOME HEALTH SERVICES | Facility: HOSPITAL | Age: 77
End: 2022-10-27
Payer: MEDICARE

## 2022-11-04 ENCOUNTER — TELEPHONE (OUTPATIENT)
Dept: NEPHROLOGY | Facility: CLINIC | Age: 77
End: 2022-11-04
Payer: MEDICARE

## 2022-11-10 ENCOUNTER — HOSPITAL ENCOUNTER (OUTPATIENT)
Dept: CARDIOLOGY | Facility: HOSPITAL | Age: 77
Discharge: HOME | End: 2022-11-10
Attending: INTERNAL MEDICINE
Payer: MEDICARE

## 2022-11-10 ENCOUNTER — HOSPITAL ENCOUNTER (OUTPATIENT)
Dept: WOUND CARE | Facility: HOSPITAL | Age: 77
Discharge: HOME | End: 2022-11-10
Attending: SURGERY
Payer: MEDICARE

## 2022-11-10 VITALS
HEART RATE: 54 BPM | WEIGHT: 270 LBS | BODY MASS INDEX: 42.38 KG/M2 | SYSTOLIC BLOOD PRESSURE: 142 MMHG | HEIGHT: 67 IN | DIASTOLIC BLOOD PRESSURE: 72 MMHG | TEMPERATURE: 98 F

## 2022-11-10 DIAGNOSIS — E11.42 TYPE 2 DIABETES MELLITUS WITH DIABETIC POLYNEUROPATHY, WITH LONG-TERM CURRENT USE OF INSULIN: Chronic | ICD-10-CM

## 2022-11-10 DIAGNOSIS — I89.0 LYMPHEDEMA: ICD-10-CM

## 2022-11-10 DIAGNOSIS — I87.2 VENOUS INSUFFICIENCY OF BOTH LOWER EXTREMITIES: ICD-10-CM

## 2022-11-10 DIAGNOSIS — Z79.4 TYPE 2 DIABETES MELLITUS WITH DIABETIC POLYNEUROPATHY, WITH LONG-TERM CURRENT USE OF INSULIN: Chronic | ICD-10-CM

## 2022-11-10 DIAGNOSIS — I87.2 VENOUS INSUFFICIENCY OF BOTH LOWER EXTREMITIES: Primary | ICD-10-CM

## 2022-11-10 LAB
LEFT SMALL SAPHENOUS KNEE REFLUX: 365 MS
RIGHT GREAT SAPHENOUS DISTAL THIGH DIA: 0.52 CM
RIGHT GREAT SAPHENOUS DISTAL THIGH REFLUX: 2081 MS
RIGHT GREAT SAPHENOUS JUNCTION DIA: 0.57 CM
RIGHT GREAT SAPHENOUS MIDDLE THIGH DIA: 0.45 CM
RIGHT GREAT SAPHENOUS MIDDLE THIGH REFLUX: 1581 MS

## 2022-11-10 PROCEDURE — 93970 EXTREMITY STUDY: CPT | Mod: 26,,, | Performed by: INTERNAL MEDICINE

## 2022-11-10 PROCEDURE — 29581 APPL MULTLAYER CMPRN SYS LEG: CPT

## 2022-11-10 PROCEDURE — 93970 CV US LOWER VENOUS INSUFFICIENCY BILATERAL (CUPID ONLY): ICD-10-PCS | Mod: 26,,, | Performed by: INTERNAL MEDICINE

## 2022-11-10 PROCEDURE — 93970 EXTREMITY STUDY: CPT | Mod: TC

## 2022-11-10 NOTE — PROGRESS NOTES
[facility]    Subjective:       Patient ID: Sherin Ortiz is a 77 y.o. female.    Chief Complaint: Wound Care    Patient to wound care center for dressing change for both legs , Progressing very well , no new concerns , continue with same plan of care. Legs look better than ever today. Pt still trying to get transportation to get measured by her insurance approved DME for [employer] (as they will not accept our measurements).    Review of Systems   All systems were reviewed and are negative, except that mentioned in the HPI.    Objective:      Physical Exam  Constitutional:       Appearance: She is well-developed.   HENT:      Head: Normocephalic and atraumatic.   Eyes:      Pupils: Pupils are equal, round, and reactive to light.   Cardiovascular:      Rate and Rhythm: Normal rate.   Pulmonary:      Effort: Pulmonary effort is normal. No respiratory distress.      Breath sounds: No stridor.   Abdominal:      General: There is no distension.   Musculoskeletal:      Cervical back: Normal range of motion.   Skin:     Comments: See Synopsis for wound details   Neurological:      Mental Status: She is alert and oriented to person, place, and time.          Wound 08/18/22 1400 Other (comment) Left lower Leg (Active)   08/18/22 1400    Pre-existing: Yes   Primary Wound Type: Other   Side: Left   Orientation: lower   Location: Leg   Wound Number:    Ankle-Brachial Index:    Pulses: doppler   Removal Indication and Assessment:    Wound Outcome:    (Retired) Wound Type:    (Retired) Wound Length (cm):    (Retired) Wound Width (cm):    (Retired) Depth (cm):    Wound Description (Comments):    Removal Indications:    Wound Image   11/10/22 1344   Dressing Appearance Intact;Moist drainage 11/10/22 1344   Drainage Amount Moderate 11/10/22 1344   Drainage Characteristics/Odor Serosanguineous 11/10/22 1344   Appearance Red;Moist 11/10/22 1344   Tissue loss description Partial  thickness 11/10/22 1344   Red (%), Wound Tissue Color 100 % 11/10/22 1344   Periwound Area Edematous 11/10/22 1344   Wound Edges Undefined 11/10/22 1344   Care Cleansed with:;Soap and water;Sterile normal saline 11/10/22 1344   Dressing Applied;Hydrofiber;Compression wrap;Absorptive Pad 11/10/22 1344   Periwound Care Dry periwound area maintained 11/10/22 1344   Compression Two layer compression 11/10/22 1344   Dressing Change Due 12/01/22 11/10/22 1344            Wound 08/18/22 1400 Other (comment) Right lower Leg (Active)   08/18/22 1400    Pre-existing: Yes   Primary Wound Type: Other   Side: Right   Orientation: lower   Location: Leg   Wound Number:    Ankle-Brachial Index:    Pulses: doppler   Removal Indication and Assessment:    Wound Outcome:    (Retired) Wound Type:    (Retired) Wound Length (cm):    (Retired) Wound Width (cm):    (Retired) Depth (cm):    Wound Description (Comments):    Removal Indications:    Wound Image   11/10/22 1344   Dressing Appearance Intact;Moist drainage 11/10/22 1344   Drainage Amount Moderate 11/10/22 1344   Drainage Characteristics/Odor Serosanguineous 11/10/22 1344   Appearance Red;Moist 11/10/22 1344   Tissue loss description Partial thickness 11/10/22 1344   Red (%), Wound Tissue Color 100 % 11/10/22 1344   Periwound Area Edematous 11/10/22 1344   Wound Edges Undefined 11/10/22 1344   Care Cleansed with:;Soap and water;Sterile normal saline 11/10/22 1344   Dressing Applied;Hydrofiber;Compression wrap;Absorptive Pad 11/10/22 1344   Periwound Care Dry periwound area maintained 11/10/22 1344   Compression Two layer compression 11/10/22 1344   Dressing Change Due 12/01/22 11/10/22 1344         Assessment/Plan:         ICD-10-CM ICD-9-CM   1. Venous insufficiency of both lower extremities  I87.2 459.81   2. Lymphedema  I89.0 457.1   3. Type 2 diabetes mellitus with diabetic polyneuropathy, with long-term current use of insulin  E11.42 250.60    Z79.4 357.2     V58.67            Tissue pathology and/or culture taken:  [] Yes [x] No   Sharp debridement performed:   [] Yes [x] No   Labs ordered this visit:   [] Yes [x] No   Imaging ordered this visit:   [] Yes [x] No           Orders Placed This Encounter   Procedures    Change dressing     Right and left lower legs:   Cleanse wound with: Normal Saline   Lidocaine:PRN   Primary dressing: Hydrofera blue ready to any open wounds   Secondary dressing: small abd pad, small abd or moses foam to Left ankle, Co-flex with calamine BLE toes to knee   Edema control: Co-flex with calamine to BLE toes to knee, protect bilateral toes with small abd pad     Follow-up:  Dr. Perry 3 week Thursday 12/1/22  Home Health: [facility]: Orders as above. Change dressings Thursdays and prn except when in clinic     Other Orders: Protect bilateral toes with small abd pad. [facility] has [facility] order and should call to arrange measurements.  Pt has their phone number as well.  Procedure with   11/17/22.        Follow up in about 3 weeks (around 12/1/2022) for .

## 2022-11-17 ENCOUNTER — HOSPITAL ENCOUNTER (OUTPATIENT)
Facility: HOSPITAL | Age: 77
Discharge: HOME | End: 2022-11-17
Attending: INTERNAL MEDICINE | Admitting: INTERNAL MEDICINE
Payer: MEDICARE

## 2022-11-17 VITALS
HEART RATE: 52 BPM | TEMPERATURE: 97 F | RESPIRATION RATE: 16 BRPM | BODY MASS INDEX: 42.38 KG/M2 | HEIGHT: 67 IN | WEIGHT: 270 LBS | SYSTOLIC BLOOD PRESSURE: 182 MMHG | DIASTOLIC BLOOD PRESSURE: 74 MMHG | OXYGEN SATURATION: 100 %

## 2022-11-17 DIAGNOSIS — I87.2 VENOUS INSUFFICIENCY OF BOTH LOWER EXTREMITIES: ICD-10-CM

## 2022-11-17 DIAGNOSIS — I87.2 VENOUS REFLUX: ICD-10-CM

## 2022-11-17 PROCEDURE — 36465 PR INJ, NONCMPND FOAM SCLEROSANT, 1 VEIN: ICD-10-PCS | Mod: RT,,, | Performed by: INTERNAL MEDICINE

## 2022-11-17 PROCEDURE — C1769 GUIDE WIRE: HCPCS | Performed by: INTERNAL MEDICINE

## 2022-11-17 PROCEDURE — 36465 NJX NONCMPND SCLRSNT 1 VEIN: CPT | Mod: RT,,, | Performed by: INTERNAL MEDICINE

## 2022-11-17 PROCEDURE — 25000003 PHARM REV CODE 250: Performed by: INTERNAL MEDICINE

## 2022-11-17 PROCEDURE — 27201423 OPTIME MED/SURG SUP & DEVICES STERILE SUPPLY: Performed by: INTERNAL MEDICINE

## 2022-11-17 PROCEDURE — 36465 NJX NONCMPND SCLRSNT 1 VEIN: CPT | Mod: RT | Performed by: INTERNAL MEDICINE

## 2022-11-17 PROCEDURE — C1894 INTRO/SHEATH, NON-LASER: HCPCS | Performed by: INTERNAL MEDICINE

## 2022-11-17 RX ORDER — LIDOCAINE HYDROCHLORIDE 10 MG/ML
INJECTION, SOLUTION EPIDURAL; INFILTRATION; INTRACAUDAL; PERINEURAL
Status: DISCONTINUED | OUTPATIENT
Start: 2022-11-17 | End: 2022-11-18 | Stop reason: HOSPADM

## 2022-11-17 NOTE — DISCHARGE INSTRUCTIONS
Recovering at home  Once at home, follow all the instructions youve been given. Be sure to:  Take all medicines as directed  Care for the catheter insertion site as directed  Check for signs of infection at the catheter insertion site: check for warmth, redness, yellow/green discharge from access site  Leave bandages in place for 2 days- DO NOT GET WET  Wear elastic stockings as directed: first 2 days wear stockings day and night. After the first 2 initials days, wear stocking during the day only for 14 days.  Walk a few times a day. Walk for at least 5 minutes every hours, while awake.  Avoid heavy exercise, lifting, and standing for long periods as advised  Avoid air travel, hot baths, saunas, or whirlpools for the next month

## 2022-11-17 NOTE — H&P
Subjective:   Patient ID:  Sherin Ortiz is a 77 y.o. female who presents for management of Venous Insufficiency, Obesity, and Non-healing Wound        HPI:         77-year-old female past medical history of venous insufficiency complicated with ulcerations edema venous claudication, diastolic dysfunction, obesity, diabetes, lymphedema, hyperlipidemia, hypertension, chronic kidney disease, physical debilitation, wheelchair/scooter dependent, and depression presenting by recommendation of care team for management of venous insufficiency. She is s/p ablation of left GSV and sclerotherapy of left medial ankle accessory veins (6/2022)        Venous US 12/2021                            R GSV 6.4 mm + reflux 2963 msec              R LSV 3.4 mm + reflux 584 msec                 L GSV 6.5 mm + reflux 1256 msec               L LSV to small to visualize                   No DVT                  CEAP 6  VCSS 12        Ablation of left GSV and sclerotherapy of left medial ankle accessory veins (6/2022)              Patient Active Problem List     Diagnosis Date Noted    ACP (advance care planning) 07/20/2022    GIB (gastrointestinal bleeding) 07/13/2022    Avulsed toenail, initial encounter      Toenail deformity      Venous ulcer, limited to breakdown of skin      Obesity, diabetes, and hypertension syndrome 01/20/2022    Pyelonephritis 01/18/2022    Adrenal nodule 01/18/2022    Obesity, morbid (more than 100 lbs over ideal weight or BMI > 40)      Skin ulcer of left great toe with fat layer exposed      Venous insufficiency of both lower extremities      Venous reflux      Ulcer of great toe, left, limited to breakdown of skin 10/27/2021    Venous ulcer of both lower extremities with varicose veins 09/22/2021    UTI due to extended-spectrum beta lactamase (ESBL) producing Escherichia coli 05/17/2021    Unable to bear weight 01/15/2021    Type 2 diabetes mellitus with hypoglycemia without coma, with long-term current use  "of insulin 2021    Closed nondisplaced fracture of left calcaneus 2021    Osteopenia 2021    Charcot ankle, left 2021    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  Lt DISHA (1.09) Segment/Brachial Index and PVR waveforms indicate minimal peripheral arterial obstructive disease."  Other Chronic Conditions:  HLD, DM    Medications:  Aspirin 81 mg, Lipi  tor 40 mg       Dermatitis, stasis 10/19/2016    Wheelchair dependent 2015    Type 2 diabetes mellitus with diabetic polyneuropathy, with long-term current use of insulin 2015    Anemia of chronic disease 2015    Hypoalbuminemia 2015    Inability to walk 2015    Morbid obesity with body mass index (BMI) greater than or equal to 50 2015    Stage 4 chronic kidney disease 2013    PSC (posterior subcapsular cataract) - Both Eyes 2013    Nuclear sclerosis - Both Eyes 2013    Asteroid hyalosis - Left Eye 2013    Debility 2012    Essential hypertension 2012    Hyperlipidemia LDL goal <100 2012    Bilateral leg edema 2012               Right Arm BP - Sittin/67  Left Arm BP - Sittin/67           LABS     LAST HbA1c        Lab Results   Component Value Date     HGBA1C 8.4 (H) 2022         Lipid panel        Lab Results   Component Value Date     CHOL 163 2022    "  CHOL 130 03/30/2021     CHOL 127 09/24/2020            Lab Results   Component Value Date     HDL 37 (L) 06/30/2022     HDL 42 03/30/2021     HDL 49 09/24/2020            Lab Results   Component Value Date     LDLCALC 104.8 06/30/2022     LDLCALC 70.2 03/30/2021     LDLCALC 59.8 (L) 09/24/2020            Lab Results   Component Value Date     TRIG 106 06/30/2022     TRIG 89 03/30/2021     TRIG 91 09/24/2020            Lab Results   Component Value Date     CHOLHDL 22.7 06/30/2022     CHOLHDL 32.3 03/30/2021     CHOLHDL 38.6 09/24/2020               Review of Systems   Constitutional: Positive for weight gain. Negative for diaphoresis, night sweats and weight loss.   HENT:  Negative for congestion.    Eyes:  Negative for blurred vision, discharge and double vision.   Cardiovascular:  Positive for leg swelling. Negative for chest pain, claudication, cyanosis, dyspnea on exertion, irregular heartbeat, near-syncope, orthopnea, palpitations, paroxysmal nocturnal dyspnea and syncope.   Respiratory:  Positive for shortness of breath and sleep disturbances due to breathing. Negative for cough and wheezing.    Endocrine: Negative for cold intolerance, heat intolerance and polyphagia.   Hematologic/Lymphatic: Negative for adenopathy and bleeding problem. Does not bruise/bleed easily.   Skin:  Positive for poor wound healing. Negative for dry skin and nail changes.   Musculoskeletal:  Positive for arthritis and muscle weakness. Negative for back pain, falls, joint pain, myalgias and neck pain.   Gastrointestinal:  Negative for bloating, abdominal pain, change in bowel habit and constipation.   Genitourinary:  Negative for bladder incontinence, dysuria, flank pain, genital sores and missed menses.   Neurological:  Negative for aphonia, brief paralysis, difficulty with concentration, dizziness and weakness.   Psychiatric/Behavioral:  Positive for depression. Negative for altered mental status and memory loss. The patient does  not have insomnia.    Allergic/Immunologic: Negative for environmental allergies.      Objective:   Physical Exam  Constitutional:       General: She is awake.      Comments: Obese     Uses a scooter         HENT:      Head: Normocephalic and atraumatic.      Jaw: There is normal jaw occlusion.   Eyes:      General: Lids are normal.      Extraocular Movements: Extraocular movements intact.      Conjunctiva/sclera: Conjunctivae normal.   Cardiovascular:      Rate and Rhythm: Regular rhythm. Occasional Extrasystoles are present.     Pulses:           Radial pulses are 2+ on the right side and 2+ on the left side.        Femoral pulses are 2+ on the right side and 2+ on the left side.       Popliteal pulses are 1+ on the right side and 1+ on the left side.         Right dorsalis pedis pulse not accessible and left dorsalis pedis pulse not accessible.         Right posterior tibial pulse not accessible and left posterior tibial pulse not accessible.   Pulmonary:      Effort: Pulmonary effort is normal.      Breath sounds: Normal breath sounds.   Abdominal:      Palpations: Abdomen is soft.   Musculoskeletal:      Cervical back: Full passive range of motion without pain and normal range of motion.   Skin:     Comments:      Venous stasis ulcers      See images    Neurological:      Mental Status: She is alert.   Psychiatric:         Behavior: Behavior is cooperative.         Assessment:      1. Venous insufficiency of both lower extremities    2. PAOD (peripheral arterial occlusive disease)    3. Heart failure with preserved ejection fraction, unspecified HF chronicity    4. 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 Venous reflux    6. Stage 4 chronic kidney disease    7. Anemia of chronic disease    8. Morbid obesity with body mass index (BMI) greater than or equal to 50    9. Skin ulcer of left great toe with fat layer exposed    10. Venous ulcer of both lower extremities with varicose veins     11. Debility          Plan:            She is status post left greater saphenous ablation and sclerotherapy left ankle accessory veins.  She will proceed with for right greater saphenous and lesser saphenous ablation.  She will have a surveillance ultrasound to determine if there are any residual veins in the left lower extremity with reflux that will require additional treatment.     Proceed with ablation.      The risks (including but not limited to pain, bruising, nerve injury, infection, venous thrombosis), the potential benefits, and alternatives to the procedure were discussed in detail and accepted by the patient.     All questions were answered. Patient agrees to proceed.      Carmel Jacob MD   Interventional Cardiology Fellow, PGY-8

## 2022-11-17 NOTE — NURSING
Pt discharged per MD order. Vitals stable. Pt ambulated in unit with no problems and dressed self. Right leg site WNL, no swelling, bleeding, oozing, tenderness, or hematoma present. All discharge instructions given and pt verbalizes full understanding to all instructions and all questions answered. Pt was taken to elevators in her own hoveround wheelchair.

## 2022-11-17 NOTE — PLAN OF CARE
Patient lying in bed no complaints of pain or distress. V/s stable  Procedure and recovery process discussed with patient. All question answered and patient understood.Will continue to monitor

## 2022-11-17 NOTE — DISCHARGE SUMMARY
"Discharge Summary  Interventional Cardiology      Admit Date: 11/17/2022    Discharge Date:  11/17/2022    Attending Physician: Vahid Farfan MD    Discharge Physician: Carmel Jacob MD    Principal Diagnoses: Venous ulcer of both lower extremities with varicose veins  Indication for Admission: Ablation (Right)    Discharged Condition: Good    Hospital Course: Patient tolerated treatment/procedure well without complication and is now ready for discharge.        Final diagnosis:  Venous insufficiency of both lower extremities     Outpatient Plan:  -  Follow up in clinic    Diet: Cardiac diet    Activity: Ad melba, wound care instructions provided    Disposition: Home or Self Care      Discharge Medications:      Medication List        ASK your doctor about these medications      ascorbic acid (vitamin C) 500 MG tablet  Commonly known as: VITAMIN C     atorvastatin 40 MG tablet  Commonly known as: LIPITOR  Take 1 tablet (40 mg total) by mouth every evening.     SHANNAN CHEWABLE ASPIRIN 81 MG Chew  Generic drug: aspirin  Chew 1 tablet (81 mg total) by mouth once daily.     DROPLET INSULIN SYR(HALF UNIT) 0.5 mL 30 gauge x 1/2" Syrg  Generic drug: insulin syr/ndl U100 half yesenia  Use to administer insulin nightly     ferrous sulfate 325 (65 FE) MG EC tablet  Take 1 tablet (325 mg total) by mouth once daily.     insulin detemir U-100 100 unit/mL (3 mL) Inpn pen  Commonly known as: Levemir FLEXTOUCH  Inject 10 Units into the skin every evening.     lisinopriL 5 MG tablet  Commonly known as: PRINIVIL,ZESTRIL  Take 1 tablet (5 mg total) by mouth once daily.     pantoprazole 40 MG tablet  Commonly known as: PROTONIX  Take 1 tablet (40 mg total) by mouth once daily.     TRUE METRIX GLUCOSE TEST STRIP Strp  Generic drug: blood sugar diagnostic  TEST TWO TIMES DAILY     TRUEPLUS LANCETS 33 gauge Misc  Generic drug: lancets  TEST TWO TIMES DAILY              "

## 2022-11-17 NOTE — PROCEDURES
S/P R GSV ablation using Verithena foam therapy    Carmel Jacob MD   Interventional Cardiology Fellow, PGY-8

## 2022-11-17 NOTE — Clinical Note
A percutaneous stick to the greater saphenous vein vein was performed. Ultrasound guidance was used to obtain access.

## 2022-11-17 NOTE — PLAN OF CARE
Pt discharged per MD order. Vitals stable. Pt used w/c in unit with no problems and dressed self. Right groin site WNL, no swelling, bleeding, oozing, tenderness, or hematoma present. All discharge instructions given and pt verbalizes full understanding to all instructions and all questions answered. Pt was taken down to private car via wheelchair.

## 2022-11-17 NOTE — Clinical Note
The site was marked. The site was prepped with ChloraPrep. The patient was draped. The patient was positioned supine. Right leg.

## 2022-11-22 ENCOUNTER — TELEPHONE (OUTPATIENT)
Dept: CARDIOLOGY | Facility: HOSPITAL | Age: 77
End: 2022-11-22
Payer: MEDICARE

## 2022-11-22 DIAGNOSIS — I87.2 VENOUS INSUFFICIENCY OF RIGHT LOWER EXTREMITY: Primary | ICD-10-CM

## 2022-12-01 ENCOUNTER — DOCUMENT SCAN (OUTPATIENT)
Dept: HOME HEALTH SERVICES | Facility: HOSPITAL | Age: 77
End: 2022-12-01
Payer: MEDICARE

## 2022-12-01 ENCOUNTER — HOSPITAL ENCOUNTER (OUTPATIENT)
Dept: WOUND CARE | Facility: HOSPITAL | Age: 77
Discharge: HOME | End: 2022-12-01
Attending: SURGERY
Payer: MEDICARE

## 2022-12-01 DIAGNOSIS — E11.42 TYPE 2 DIABETES MELLITUS WITH DIABETIC POLYNEUROPATHY, WITH LONG-TERM CURRENT USE OF INSULIN: ICD-10-CM

## 2022-12-01 DIAGNOSIS — I87.2 VENOUS INSUFFICIENCY OF BOTH LOWER EXTREMITIES: Primary | ICD-10-CM

## 2022-12-01 DIAGNOSIS — I89.0 LYMPHEDEMA: ICD-10-CM

## 2022-12-01 DIAGNOSIS — E66.01 OBESITY, MORBID (MORE THAN 100 LBS OVER IDEAL WEIGHT OR BMI > 40): ICD-10-CM

## 2022-12-01 DIAGNOSIS — Z79.4 TYPE 2 DIABETES MELLITUS WITH DIABETIC POLYNEUROPATHY, WITH LONG-TERM CURRENT USE OF INSULIN: ICD-10-CM

## 2022-12-01 PROCEDURE — G0179 MD RECERTIFICATION HHA PT: HCPCS | Mod: ,,, | Performed by: INTERNAL MEDICINE

## 2022-12-01 PROCEDURE — 29581 APPL MULTLAYER CMPRN SYS LEG: CPT | Mod: HCNC

## 2022-12-01 PROCEDURE — G0179 PR HOME HEALTH MD RECERTIFICATION: ICD-10-PCS | Mod: ,,, | Performed by: INTERNAL MEDICINE

## 2022-12-01 NOTE — PROGRESS NOTES
Wound Care & [facility]    Subjective:       Patient ID: Sherin Ortiz is a 77 y.o. female.    Chief Complaint: Venous Stasis    Patients presents to wound care for ble wounds. S/p procedure with  11/17/22. BLE edema and redness improving. Changed to Xeroform and calamine coflex due to hydrofera ready sticking to wounds. Verbalized plan of care. Home health updated new orders. Follow up 2 weeks. Pt is still working on transportation to the [facility] to get insurance-approved measurement for [facility]. No new c/o.    Review of Systems   All systems were reviewed and are negative, except that mentioned in the HPI.    Objective:      Physical Exam  Constitutional:       Appearance: She is well-developed.   HENT:      Head: Normocephalic and atraumatic.   Eyes:      Pupils: Pupils are equal, round, and reactive to light.   Cardiovascular:      Rate and Rhythm: Normal rate.   Pulmonary:      Effort: Pulmonary effort is normal. No respiratory distress.      Breath sounds: No stridor.   Abdominal:      General: There is no distension.   Musculoskeletal:      Cervical back: Normal range of motion.   Skin:     Comments: See Synopsis for wound details   Neurological:      Mental Status: She is alert and oriented to person, place, and time.          Wound 08/18/22 1400 Other (comment) Left lower Leg (Active)   08/18/22 1400    Pre-existing: Yes   Primary Wound Type: Other   Side: Left   Orientation: lower   Location: Leg   Wound Number:    Ankle-Brachial Index:    Pulses: doppler   Removal Indication and Assessment:    Wound Outcome:    (Retired) Wound Type:    (Retired) Wound Length (cm):    (Retired) Wound Width (cm):    (Retired) Depth (cm):    Wound Description (Comments):    Removal Indications:    Wound Image    12/01/22 1341   Dressing Appearance Intact;Moist drainage 12/01/22 1341   Drainage Amount Small 12/01/22 1341   Drainage Characteristics/Odor Serosanguineous  12/01/22 1341   Appearance Red;Pink;Moist 12/01/22 1341   Tissue loss description Partial thickness 12/01/22 1341   Red (%), Wound Tissue Color 100 % 12/01/22 1341   Periwound Area Edematous;Intact;Pink 12/01/22 1341   Wound Edges Open;Irregular 12/01/22 1341   Wound Length (cm) 2 cm 12/01/22 1341   Wound Width (cm) 1.2 cm 12/01/22 1341   Wound Depth (cm) 0.1 cm 12/01/22 1341   Wound Volume (cm^3) 0.24 cm^3 12/01/22 1341   Wound Surface Area (cm^2) 2.4 cm^2 12/01/22 1341   Care Cleansed with:;Sterile normal saline 12/01/22 1341   Dressing Applied;Non-adherent;Absorptive Pad;Compression wrap 12/01/22 1341   Periwound Care Absorptive dressing applied;Dry periwound area maintained 12/01/22 1341   Compression Two layer compression 12/01/22 1341   Dressing Change Due 01/05/23 12/01/22 1341            Wound 08/18/22 1400 Other (comment) Right lower Leg (Active)   08/18/22 1400    Pre-existing: Yes   Primary Wound Type: Other   Side: Right   Orientation: lower   Location: Leg   Wound Number:    Ankle-Brachial Index:    Pulses: doppler   Removal Indication and Assessment:    Wound Outcome:    (Retired) Wound Type:    (Retired) Wound Length (cm):    (Retired) Wound Width (cm):    (Retired) Depth (cm):    Wound Description (Comments):    Removal Indications:    Wound Image    12/01/22 1341   Dressing Appearance Intact;Moist drainage 12/01/22 1341   Drainage Amount Moderate 12/01/22 1341   Drainage Characteristics/Odor Serosanguineous 12/01/22 1341   Appearance Red;Pink;Moist 12/01/22 1341   Tissue loss description Partial thickness 12/01/22 1341   Red (%), Wound Tissue Color 100 % 12/01/22 1341   Periwound Area Edematous;Dry;Intact;Pink 12/01/22 1341   Wound Edges Undefined 12/01/22 1341   Wound Length (cm) 8 cm 12/01/22 1341   Wound Width (cm) 8.5 cm 12/01/22 1341   Wound Depth (cm) 0.1 cm 12/01/22 1341   Wound Volume (cm^3) 6.8 cm^3 12/01/22 1341   Wound Surface Area (cm^2) 68 cm^2 12/01/22 1341   Care Cleansed  with:;Sterile normal saline 12/01/22 1341   Dressing Applied;Non-adherent;Absorptive Pad;Compression wrap 12/01/22 1341   Periwound Care Absorptive dressing applied;Dry periwound area maintained 12/01/22 1341   Compression Two layer compression 12/01/22 1341   Dressing Change Due 01/05/23 12/01/22 1341         Assessment/Plan:         ICD-10-CM ICD-9-CM   1. Venous insufficiency of both lower extremities  I87.2 459.81   2. Lymphedema  I89.0 457.1   3. Type 2 diabetes mellitus with diabetic polyneuropathy, with long-term current use of insulin  E11.42 250.60    Z79.4 357.2     V58.67   4. Obesity, morbid (more than 100 lbs over ideal weight or BMI > 40)  E66.01 278.01           Tissue pathology and/or culture taken:  [] Yes [x] No   Sharp debridement performed:   [] Yes [x] No   Labs ordered this visit:   [] Yes [x] No   Imaging ordered this visit:   [] Yes [x] No           Orders Placed This Encounter   Procedures    Change dressing     Right and left lower legs:   Cleanse wound with: Normal Saline   Lidocaine:PRN   Primary dressing:Xeroform to any open wounds   Secondary dressing: small abd pad, Co-flex with calamine BLE toes to knee   Edema control: Co-flex with calamine to BLE toes to knee, protect bilateral toes with small abd pad     Follow-up:  Dr. Perry 2 weeks 12/13/22  Home Health: [facility]: Orders as above. Change dressings Thursdays and prn except when in clinic     Other Orders: Protect bilateral toes with small abd pad. [facility] has [facility] order and should call to arrange measurements.  Pt has their phone number as well.   Ultrasound for  12/7/22 and appt with  12/19/22      Continue current medical regimen.  Elevate legs while at rest.  All questions were answered.  Awaiting patient to get transportation to the [facility] for insurance-approved measurements for [facility].  Follow up in about 2 weeks (around 12/15/2022) for .

## 2022-12-02 ENCOUNTER — TELEPHONE (OUTPATIENT)
Dept: NEPHROLOGY | Facility: CLINIC | Age: 77
End: 2022-12-02
Payer: MEDICARE

## 2022-12-02 NOTE — TELEPHONE ENCOUNTER
----- Message from Kim Berry sent at 12/2/2022  3:22 PM CST -----  Contact: Self/[phone]  Pt said that she is calling in regards to she is scheduled to have labs and a urine specimen at [facility] on 12/9 pt stated that [facility] is coming out to her home on 12/8 and wants to know if the nurse can do her labs and Urine when she comes to visit. Please advise

## 2022-12-07 ENCOUNTER — HOSPITAL ENCOUNTER (OUTPATIENT)
Dept: CARDIOLOGY | Facility: HOSPITAL | Age: 77
Discharge: HOME | End: 2022-12-07
Attending: INTERNAL MEDICINE
Payer: MEDICARE

## 2022-12-07 DIAGNOSIS — I87.2 VENOUS INSUFFICIENCY OF RIGHT LOWER EXTREMITY: ICD-10-CM

## 2022-12-07 LAB
RIGHT GREAT SAPHENOUS DISTAL THIGH DIA: 0.4 CM
RIGHT GREAT SAPHENOUS DISTAL THIGH REFLUX: 1541 MS
RIGHT GREAT SAPHENOUS KNEE DIA: 0.47 CM
RIGHT GREAT SAPHENOUS MIDDLE THIGH DIA: 0.48 CM
RIGHT GREAT SAPHENOUS MIDDLE THIGH REFLUX: 1398 MS
RIGHT GREAT SAPHENOUS PROXIMAL CALF DIA: 0.43 CM
RIGHT SMALL SAPHENOUS KNEE DIA: 0.38 CM
RIGHT SMALL SAPHENOUS SPJ DIA: 0.58 CM

## 2022-12-07 PROCEDURE — 93971 EXTREMITY STUDY: CPT | Mod: TC,RT

## 2022-12-07 PROCEDURE — 93971 CV US LOWER VENOUS INSUFFICIENCY RIGHT: ICD-10-PCS | Mod: 26,RT,, | Performed by: INTERNAL MEDICINE

## 2022-12-07 PROCEDURE — 93971 EXTREMITY STUDY: CPT | Mod: 26,RT,, | Performed by: INTERNAL MEDICINE

## 2022-12-09 ENCOUNTER — TELEPHONE (OUTPATIENT)
Dept: NEPHROLOGY | Facility: CLINIC | Age: 77
End: 2022-12-09
Payer: MEDICARE

## 2022-12-13 ENCOUNTER — OFFICE VISIT (OUTPATIENT)
Dept: CARDIOLOGY | Facility: CLINIC | Age: 77
End: 2022-12-13
Payer: MEDICARE

## 2022-12-13 ENCOUNTER — HOSPITAL ENCOUNTER (OUTPATIENT)
Dept: WOUND CARE | Facility: HOSPITAL | Age: 77
Discharge: HOME | End: 2022-12-13
Attending: SURGERY
Payer: MEDICARE

## 2022-12-13 DIAGNOSIS — I70.0 AORTIC ATHEROSCLEROSIS: ICD-10-CM

## 2022-12-13 DIAGNOSIS — Z99.3 WHEELCHAIR DEPENDENT: Chronic | ICD-10-CM

## 2022-12-13 DIAGNOSIS — L97.929 VENOUS ULCER OF BOTH LOWER EXTREMITIES WITH VARICOSE VEINS: ICD-10-CM

## 2022-12-13 DIAGNOSIS — I87.2 VENOUS INSUFFICIENCY OF BOTH LOWER EXTREMITIES: ICD-10-CM

## 2022-12-13 DIAGNOSIS — I87.2 VENOUS INSUFFICIENCY OF BOTH LOWER EXTREMITIES: Primary | ICD-10-CM

## 2022-12-13 DIAGNOSIS — E11.42 TYPE 2 DIABETES MELLITUS WITH DIABETIC POLYNEUROPATHY, WITH LONG-TERM CURRENT USE OF INSULIN: Chronic | ICD-10-CM

## 2022-12-13 DIAGNOSIS — I83.019 VENOUS ULCER OF BOTH LOWER EXTREMITIES WITH VARICOSE VEINS: ICD-10-CM

## 2022-12-13 DIAGNOSIS — M14.672 CHARCOT ANKLE, LEFT: ICD-10-CM

## 2022-12-13 DIAGNOSIS — L30.8 DERMATITIS ASSOCIATED WITH MOISTURE: Primary | ICD-10-CM

## 2022-12-13 DIAGNOSIS — I89.0 LYMPHEDEMA: ICD-10-CM

## 2022-12-13 DIAGNOSIS — R26.89 UNABLE TO BEAR WEIGHT: ICD-10-CM

## 2022-12-13 DIAGNOSIS — I83.029 VENOUS ULCER OF BOTH LOWER EXTREMITIES WITH VARICOSE VEINS: ICD-10-CM

## 2022-12-13 DIAGNOSIS — I50.30 HEART FAILURE WITH PRESERVED EJECTION FRACTION, UNSPECIFIED HF CHRONICITY: ICD-10-CM

## 2022-12-13 DIAGNOSIS — I77.9 PAOD (PERIPHERAL ARTERIAL OCCLUSIVE DISEASE): ICD-10-CM

## 2022-12-13 DIAGNOSIS — I10 ESSENTIAL HYPERTENSION: Chronic | ICD-10-CM

## 2022-12-13 DIAGNOSIS — N18.4 STAGE 4 CHRONIC KIDNEY DISEASE: ICD-10-CM

## 2022-12-13 DIAGNOSIS — L97.919 VENOUS ULCER OF BOTH LOWER EXTREMITIES WITH VARICOSE VEINS: ICD-10-CM

## 2022-12-13 DIAGNOSIS — Z79.4 TYPE 2 DIABETES MELLITUS WITH DIABETIC POLYNEUROPATHY, WITH LONG-TERM CURRENT USE OF INSULIN: ICD-10-CM

## 2022-12-13 DIAGNOSIS — Z79.4 TYPE 2 DIABETES MELLITUS WITH DIABETIC POLYNEUROPATHY, WITH LONG-TERM CURRENT USE OF INSULIN: Chronic | ICD-10-CM

## 2022-12-13 DIAGNOSIS — E11.42 TYPE 2 DIABETES MELLITUS WITH DIABETIC POLYNEUROPATHY, WITH LONG-TERM CURRENT USE OF INSULIN: ICD-10-CM

## 2022-12-13 PROCEDURE — 99999 PR PBB SHADOW E&M-EST. PATIENT-LVL II: CPT | Mod: PBBFAC,,, | Performed by: INTERNAL MEDICINE

## 2022-12-13 PROCEDURE — 99215 PR OFFICE/OUTPT VISIT, EST, LEVL V, 40-54 MIN: ICD-10-PCS | Mod: S$GLB,,, | Performed by: INTERNAL MEDICINE

## 2022-12-13 PROCEDURE — 99215 OFFICE O/P EST HI 40 MIN: CPT | Mod: S$GLB,,, | Performed by: INTERNAL MEDICINE

## 2022-12-13 PROCEDURE — 99999 PR PBB SHADOW E&M-EST. PATIENT-LVL II: ICD-10-PCS | Mod: PBBFAC,,, | Performed by: INTERNAL MEDICINE

## 2022-12-13 PROCEDURE — 29581 APPL MULTLAYER CMPRN SYS LEG: CPT

## 2022-12-13 NOTE — PROGRESS NOTES
[facility]    Subjective:       Patient ID: Sherin Ortiz is a 77 y.o. female.    Chief Complaint: Non-healing Wound Follow Up and Venous Ulcer    12/13/22 Patient seen for BLE venous ulcers.  Reports  [facility] did not have supplies for dressing changes until yesterday.  Increased edema and excoriation noted.  Continue with current plan of care. Tolerated dressing changes and compression wrap application well.  Pt still has not been measured by the outside company for CircAides - as we were told that her insurance will not accept our measurements. She w seen by Dr Farfna today also. This plan was discussed. Discussed again that pt will need Circaid for bilateral lower extremities once her wounds heal. She is not a candidate for other compression long term due to her debility and social situation.  This is very important to obtain asap it Circaids will be needed after her upcoming venous procedure to prevent recurrence of her venous wounds.     Review of Systems   All systems were reviewed and are negative, except that mentioned in the HPI.    Objective:      Physical Exam  Constitutional:       Appearance: She is well-developed.   HENT:      Head: Normocephalic and atraumatic.   Eyes:      Pupils: Pupils are equal, round, and reactive to light.   Cardiovascular:      Rate and Rhythm: Normal rate.   Pulmonary:      Effort: Pulmonary effort is normal. No respiratory distress.      Breath sounds: No stridor.   Abdominal:      General: There is no distension.   Musculoskeletal:      Cervical back: Normal range of motion.   Skin:     Comments: See Synopsis for wound details   Neurological:      Mental Status: She is alert and oriented to person, place, and time.          Wound 08/18/22 1400 Other (comment) Left lower Leg (Active)   08/18/22 1400    Pre-existing: Yes   Primary Wound Type: Other   Side: Left   Orientation: lower   Location: Leg   Wound Number:     Ankle-Brachial Index:    Pulses: doppler   Removal Indication and Assessment:    Wound Outcome:    (Retired) Wound Type:    (Retired) Wound Length (cm):    (Retired) Wound Width (cm):    (Retired) Depth (cm):    Wound Description (Comments):    Removal Indications:    Wound Image    12/13/22 1438   Dressing Appearance Moist drainage 12/13/22 1438   Drainage Amount Moderate 12/13/22 1438   Drainage Characteristics/Odor Serosanguineous 12/13/22 1438   Appearance Pink;Red 12/13/22 1438   Tissue loss description Partial thickness 12/13/22 1438   Red (%), Wound Tissue Color 100 % 12/13/22 1438   Periwound Area Edematous;Excoriated 12/13/22 1438   Wound Edges Undefined;Irregular 12/13/22 1438   Wound Length (cm) 7 cm 12/13/22 1438   Wound Width (cm) 8 cm 12/13/22 1438   Wound Depth (cm) 0.1 cm 12/13/22 1438   Wound Volume (cm^3) 5.6 cm^3 12/13/22 1438   Wound Surface Area (cm^2) 56 cm^2 12/13/22 1438   Care Cleansed with:;Soap and water;Sterile normal saline 12/13/22 1438   Dressing Applied;Non-adherent 12/13/22 1438   Compression Two layer compression 12/13/22 1438   Dressing Change Due 12/20/22 12/13/22 1438            Wound 08/18/22 1400 Other (comment) Right lower Leg (Active)   08/18/22 1400    Pre-existing: Yes   Primary Wound Type: Other   Side: Right   Orientation: lower   Location: Leg   Wound Number:    Ankle-Brachial Index:    Pulses: doppler   Removal Indication and Assessment:    Wound Outcome:    (Retired) Wound Type:    (Retired) Wound Length (cm):    (Retired) Wound Width (cm):    (Retired) Depth (cm):    Wound Description (Comments):    Removal Indications:    Wound Image     12/13/22 1438   Dressing Appearance Moist drainage 12/13/22 1438   Drainage Amount Moderate 12/13/22 1438   Drainage Characteristics/Odor Serosanguineous 12/13/22 1438   Appearance Pink;Red 12/13/22 1438   Tissue loss description Partial thickness 12/13/22 1438   Red (%), Wound Tissue Color 100 % 12/13/22 1918   Periwound Area  Edematous;Excoriated 12/13/22 1438   Wound Edges Undefined 12/13/22 1438   Wound Length (cm) 2 cm 12/13/22 1438   Wound Width (cm) 1.3 cm 12/13/22 1438   Wound Depth (cm) 0.1 cm 12/13/22 1438   Wound Volume (cm^3) 0.26 cm^3 12/13/22 1438   Wound Surface Area (cm^2) 2.6 cm^2 12/13/22 1438   Care Cleansed with:;Soap and water;Sterile normal saline 12/13/22 1438   Dressing Applied;Non-adherent 12/13/22 1438   Compression Two layer compression 12/13/22 1438   Dressing Change Due 12/20/22 12/13/22 1438         Assessment/Plan:         ICD-10-CM ICD-9-CM   1. Venous insufficiency of both lower extremities  I87.2 459.81   2. Lymphedema  I89.0 457.1   3. Type 2 diabetes mellitus with diabetic polyneuropathy, with long-term current use of insulin  E11.42 250.60    Z79.4 357.2     V58.67   4. Venous ulcer of both lower extremities with varicose veins  I83.019 454.0    I83.029     L97.919     L97.929            Tissue pathology and/or culture taken:  [] Yes [x] No   Sharp debridement performed:   [] Yes [x] No   Labs ordered this visit:   [] Yes [x] No   Imaging ordered this visit:   [] Yes [x] No           Orders Placed This Encounter   Procedures    Change dressing     Right and left lower legs:     Cleanse wound with: Normal Saline   Lidocaine:PRN   Primary dressing:Xeroform to any open wounds     Edema control: Co-flex with calamine to BLE toes to knee, protect bilateral toes with small abd pad     Follow-up:  Dr. Perry 3 weeks   Home Health: [facility]: Please use supplies as per order above. Change dressings weekly.    Other Orders: [facility] has Circ Aid order and should call to arrange measurements.  Pt has their phone number as well.     Discussed again that pt will need Circaid for bilateral lower extremities once her wounds heal.   She is not a candidate for other compression long term due to her debility and social situation.    This is very important to obtain asap as Circaids will be needed after her  upcoming venous procedure to prevent recurrence of her venous wounds.    Will ask nurse navigator to f/u on Circaids for pt.  Case discussed with Dr. Farfan who agrees with the importance of obtaining Circaids asap and pt is to be scheduled for venous procedure in the near future.  All questions were answered.  Follow up in about 3 weeks (around 1/3/2023) for Dr Perry.

## 2022-12-13 NOTE — PROGRESS NOTES
Subjective:   Patient ID:  Sherin Ortiz is a 77 y.o. female who presents for management of Venous Insufficiency, Non-healing Wound, and Obesity        HPI:       Seen in wound care        77-year-old female past medical history of venous insufficiency complicated with ulcerations edema venous claudication, diastolic dysfunction, obesity, diabetes, lymphedema, hyperlipidemia, hypertension, chronic kidney disease, physical debilitation, wheelchair/scooter dependent, and depression presenting by recommendation of care team for management of venous insufficiency. She is s/p ablation of left GSV and sclerotherapy of left medial ankle accessory veins (6/2022) + R GSV ablation (11/2022). She has residual deep venous reflux disease.       Venous US 12/2021      R GSV 6.4 mm + reflux 2963 msec   R LSV 3.4 mm + reflux 584 msec     L GSV 6.5 mm + reflux 1256 msec    L LSV to small to visualize       No DVT             CEAP 6  VCSS 12      US 12/2022    The right superficial femoral middle vein is normal.  The right common femoral vein has reflux.  The right popliteal vein is has reflux.  The right greater saphenous vein has reflux.  There is no DVT present on the right lower extremity.    Patient Active Problem List    Diagnosis Date Noted    ACP (advance care planning) 07/20/2022    GIB (gastrointestinal bleeding) 07/13/2022    Avulsed toenail, initial encounter     Toenail deformity     Venous ulcer, limited to breakdown of skin     Obesity, diabetes, and hypertension syndrome 01/20/2022    Pyelonephritis 01/18/2022    Adrenal nodule 01/18/2022    Obesity, morbid (more than 100 lbs over ideal weight or BMI > 40)     Skin ulcer of left great toe with fat layer exposed     Venous insufficiency of both lower extremities     Venous reflux     Ulcer of great toe, left, limited to breakdown of skin 10/27/2021    Venous ulcer of both lower extremities with varicose veins 09/22/2021    UTI due to extended-spectrum beta  "lactamase (ESBL) producing Escherichia coli 05/17/2021    Unable to bear weight 01/15/2021    Type 2 diabetes mellitus with hypoglycemia without coma, with long-term current use of insulin 01/14/2021    Closed nondisplaced fracture of left calcaneus 01/13/2021    Osteopenia 01/13/2021    Charcot ankle, left 01/13/2021    Benign hypertensive heart and kidney disease with HF and CKD 01/13/2021    Lymphedema 07/05/2019    Dermatitis associated with moisture 05/10/2019    Goals of care, counseling/discussion 03/23/2019    Hx of transient ischemic attack (TIA) 01/22/2018    (HFpEF) heart failure with preserved ejection fraction 01/22/2018    Hyperparathyroidism 07/06/2017    Aortic atherosclerosis 07/06/2017     CXR 1/2016      PAOD (peripheral arterial occlusive disease) 07/06/2017     Location in Record and Date:   VAS DISHA-9/18/2015    "Rt DISHA (1.12) Segment/Brachial Index and PVR wavef  orms indicate minimal peripheral arterial obstructive disease.  Lt DISHA (1.09) Segment/Brachial Index and PVR waveforms indicate minimal peripheral arterial obstructive disease."  Other Chronic Conditions:  HLD, DM    Medications:  Aspirin 81 mg, Lipi  tor 40 mg      Dermatitis, stasis 10/19/2016    Wheelchair dependent 11/29/2015    Type 2 diabetes mellitus with diabetic polyneuropathy, with long-term current use of insulin 09/18/2015    Anemia of chronic disease 09/18/2015    Hypoalbuminemia 09/18/2015    Inability to walk 02/18/2015    Morbid obesity with body mass index (BMI) greater than or equal to 50 02/18/2015    Stage 4 chronic kidney disease 09/12/2013    PSC (posterior subcapsular cataract) - Both Eyes 04/29/2013    Nuclear sclerosis - Both Eyes 04/29/2013    Asteroid hyalosis - Left Eye 04/29/2013    Debility 12/19/2012    Essential hypertension 11/14/2012    Hyperlipidemia LDL goal <100 08/14/2012    Bilateral leg edema 07/24/2012                    LABS    LAST HbA1c  Lab Results   Component Value Date    HGBA1C 8.4 (H) " 06/30/2022       Lipid panel  Lab Results   Component Value Date    CHOL 163 06/30/2022    CHOL 130 03/30/2021    CHOL 127 09/24/2020     Lab Results   Component Value Date    HDL 37 (L) 06/30/2022    HDL 42 03/30/2021    HDL 49 09/24/2020     Lab Results   Component Value Date    LDLCALC 104.8 06/30/2022    LDLCALC 70.2 03/30/2021    LDLCALC 59.8 (L) 09/24/2020     Lab Results   Component Value Date    TRIG 106 06/30/2022    TRIG 89 03/30/2021    TRIG 91 09/24/2020     Lab Results   Component Value Date    CHOLHDL 22.7 06/30/2022    CHOLHDL 32.3 03/30/2021    CHOLHDL 38.6 09/24/2020            Review of Systems   Constitutional: Positive for weight gain. Negative for diaphoresis, night sweats and weight loss.   HENT:  Negative for congestion.    Eyes:  Negative for blurred vision, discharge and double vision.   Cardiovascular:  Positive for leg swelling. Negative for chest pain, claudication, cyanosis, dyspnea on exertion, irregular heartbeat, near-syncope, orthopnea, palpitations, paroxysmal nocturnal dyspnea and syncope.   Respiratory:  Positive for shortness of breath and sleep disturbances due to breathing. Negative for cough and wheezing.    Endocrine: Negative for cold intolerance, heat intolerance and polyphagia.   Hematologic/Lymphatic: Negative for adenopathy and bleeding problem. Does not bruise/bleed easily.   Skin:  Positive for poor wound healing. Negative for dry skin and nail changes.   Musculoskeletal:  Positive for arthritis and muscle weakness. Negative for back pain, falls, joint pain, myalgias and neck pain.   Gastrointestinal:  Negative for bloating, abdominal pain, change in bowel habit and constipation.   Genitourinary:  Negative for bladder incontinence, dysuria, flank pain, genital sores and missed menses.   Neurological:  Negative for aphonia, brief paralysis, difficulty with concentration, dizziness and weakness.   Psychiatric/Behavioral:  Positive for depression. Negative for altered  mental status and memory loss. The patient does not have insomnia.    Allergic/Immunologic: Negative for environmental allergies.     Objective:   Physical Exam  Constitutional:       General: She is awake.      Comments: Obese    Uses a scooter        HENT:      Head: Normocephalic and atraumatic.      Jaw: There is normal jaw occlusion.   Eyes:      General: Lids are normal.      Extraocular Movements: Extraocular movements intact.      Conjunctiva/sclera: Conjunctivae normal.   Cardiovascular:      Rate and Rhythm: Regular rhythm. Occasional Extrasystoles are present.     Pulses:           Radial pulses are 2+ on the right side and 2+ on the left side.        Femoral pulses are 2+ on the right side and 2+ on the left side.       Popliteal pulses are 1+ on the right side and 1+ on the left side.         Right dorsalis pedis pulse not accessible and left dorsalis pedis pulse not accessible.         Right posterior tibial pulse not accessible and left posterior tibial pulse not accessible.   Pulmonary:      Effort: Pulmonary effort is normal.      Breath sounds: Normal breath sounds.   Abdominal:      Palpations: Abdomen is soft.   Musculoskeletal:      Cervical back: Full passive range of motion without pain and normal range of motion.   Skin:     Comments:     Venous stasis ulcers     See images    Neurological:      Mental Status: She is alert.   Psychiatric:         Behavior: Behavior is cooperative.       11/2022 12/2022                          Assessment:     1. Dermatitis associated with moisture    2. Essential hypertension    3. Aortic atherosclerosis    4. PAOD (peripheral arterial occlusive disease)    5. Heart failure with preserved ejection fraction, unspecified HF chronicity    6. Venous insufficiency of both lower extremities    7. Stage 4 chronic kidney disease    8. Type 2 diabetes mellitus with diabetic polyneuropathy, with long-term current use of insulin    9. Charcot ankle, left     10. Lymphedema    11. Unable to bear weight    12. Wheelchair dependent          Plan:         She is status post bilateral greater saphenous ablation and sclerotherapy ankle accessory veins.  She will have a surveillance ultrasound to determine if there are any residual superficial veins with reflux that will require additional treatment. If there is no improvement we will discuss the option of deep venous evaluation + intervention.       She will ultimately benefit from weight loss.  She will be referred to bariatric surgery clinic to be evaluated for candidacy of weight loss surgery.  Referral to lymphedema clinic to assist with chronic lymphedema after her wounds are completely healed.      Physical therapy evaluate and treat    Treat depression         Continue with current medical plan and lifestyle changes.  Return sooner for concerns or questions. If symptoms persist go to the ED  I have reviewed all pertinent data on this patient       I have reviewed the patient's medical history in detail and updated the computerized patient record.    Orders Placed This Encounter   Procedures    CV US Lower Extremity Veins Bilateral Insufficiency     Standing Status:   Future     Standing Expiration Date:   12/13/2023     Order Specific Question:   Release to patient     Answer:   Immediate       Follow up as scheduled. Return sooner for concerns or questions            She expressed verbal understanding and agreed with the plan    -In today's visit, at least 4 established conditions that pose a risk to life or bodily function have been addressed and the conditions are severe.    -In today's visit, monitoring for drug toxicity was accomplished.      Patient's Medications   New Prescriptions    No medications on file   Previous Medications    ASCORBIC ACID, VITAMIN C, (VITAMIN C) 500 MG TABLET    Take 500 mg by mouth once daily.    ASPIRIN 81 MG CHEW    Chew 1 tablet (81 mg total) by mouth once daily.    ATORVASTATIN  "(LIPITOR) 40 MG TABLET    Take 1 tablet (40 mg total) by mouth every evening.    BLOOD SUGAR DIAGNOSTIC (TRUE METRIX GLUCOSE TEST STRIP) STRP    TEST BLOOD SUGAR TWICE DAILY    FERROUS SULFATE 325 (65 FE) MG EC TABLET    Take 1 tablet (325 mg total) by mouth once daily.    INSULIN DETEMIR U-100 (LEVEMIR FLEXTOUCH) 100 UNIT/ML (3 ML) SUBQ INPN PEN    Inject 10 Units into the skin every evening.    INSULIN SYR/NDL U100 HALF CARLA (DROPLET INSULIN SYR,HALF UNIT,) 0.5 ML 30 GAUGE X 1/2" SYRG    Use to administer insulin nightly    LISINOPRIL (PRINIVIL,ZESTRIL) 5 MG TABLET    TAKE 1 TABLET ONE TIME DAILY    PANTOPRAZOLE (PROTONIX) 40 MG TABLET    Take 1 tablet (40 mg total) by mouth once daily.    TRUEPLUS LANCETS 33 GAUGE MISC    TEST TWO TIMES DAILY   Modified Medications    No medications on file   Discontinued Medications    No medications on file        "

## 2022-12-14 ENCOUNTER — IMMUNIZATION (OUTPATIENT)
Dept: INTERNAL MEDICINE | Facility: CLINIC | Age: 77
End: 2022-12-14
Payer: MEDICARE

## 2022-12-14 ENCOUNTER — OFFICE VISIT (OUTPATIENT)
Dept: INTERNAL MEDICINE | Facility: CLINIC | Age: 77
End: 2022-12-14
Payer: MEDICARE

## 2022-12-14 VITALS
HEART RATE: 73 BPM | SYSTOLIC BLOOD PRESSURE: 146 MMHG | OXYGEN SATURATION: 99 % | BODY MASS INDEX: 42.39 KG/M2 | WEIGHT: 270.06 LBS | HEIGHT: 67 IN | DIASTOLIC BLOOD PRESSURE: 80 MMHG

## 2022-12-14 DIAGNOSIS — I10 ESSENTIAL HYPERTENSION: Chronic | ICD-10-CM

## 2022-12-14 DIAGNOSIS — Z79.4 TYPE 2 DIABETES MELLITUS WITH DIABETIC POLYNEUROPATHY, WITH LONG-TERM CURRENT USE OF INSULIN: ICD-10-CM

## 2022-12-14 DIAGNOSIS — I83.019 VENOUS ULCER-LEG SYNDROME, BILATERAL: ICD-10-CM

## 2022-12-14 DIAGNOSIS — I77.9 PAOD (PERIPHERAL ARTERIAL OCCLUSIVE DISEASE): ICD-10-CM

## 2022-12-14 DIAGNOSIS — Z87.19 HISTORY OF GASTROINTESTINAL BLEEDING: Primary | ICD-10-CM

## 2022-12-14 DIAGNOSIS — I83.029 VENOUS ULCER-LEG SYNDROME, BILATERAL: ICD-10-CM

## 2022-12-14 DIAGNOSIS — L97.929 VENOUS ULCER-LEG SYNDROME, BILATERAL: ICD-10-CM

## 2022-12-14 DIAGNOSIS — E78.5 HYPERLIPIDEMIA LDL GOAL <100: ICD-10-CM

## 2022-12-14 DIAGNOSIS — Z99.3 WHEELCHAIR DEPENDENT: Chronic | ICD-10-CM

## 2022-12-14 DIAGNOSIS — E11.42 TYPE 2 DIABETES MELLITUS WITH DIABETIC POLYNEUROPATHY, WITH LONG-TERM CURRENT USE OF INSULIN: ICD-10-CM

## 2022-12-14 DIAGNOSIS — K63.81 DIEULAFOY LESION OF INTESTINE: ICD-10-CM

## 2022-12-14 DIAGNOSIS — L97.919 VENOUS ULCER-LEG SYNDROME, BILATERAL: ICD-10-CM

## 2022-12-14 PROCEDURE — 3288F FALL RISK ASSESSMENT DOCD: CPT | Mod: CPTII,S$GLB,, | Performed by: INTERNAL MEDICINE

## 2022-12-14 PROCEDURE — 3079F DIAST BP 80-89 MM HG: CPT | Mod: CPTII,S$GLB,, | Performed by: INTERNAL MEDICINE

## 2022-12-14 PROCEDURE — 1126F PR PAIN SEVERITY QUANTIFIED, NO PAIN PRESENT: ICD-10-PCS | Mod: CPTII,S$GLB,, | Performed by: INTERNAL MEDICINE

## 2022-12-14 PROCEDURE — 99214 PR OFFICE/OUTPT VISIT, EST, LEVL IV, 30-39 MIN: ICD-10-PCS | Mod: S$GLB,,, | Performed by: INTERNAL MEDICINE

## 2022-12-14 PROCEDURE — 3288F PR FALLS RISK ASSESSMENT DOCUMENTED: ICD-10-PCS | Mod: CPTII,S$GLB,, | Performed by: INTERNAL MEDICINE

## 2022-12-14 PROCEDURE — 99999 PR PBB SHADOW E&M-EST. PATIENT-LVL IV: CPT | Mod: PBBFAC,,, | Performed by: INTERNAL MEDICINE

## 2022-12-14 PROCEDURE — 3077F SYST BP >= 140 MM HG: CPT | Mod: CPTII,S$GLB,, | Performed by: INTERNAL MEDICINE

## 2022-12-14 PROCEDURE — 1101F PR PT FALLS ASSESS DOC 0-1 FALLS W/OUT INJ PAST YR: ICD-10-PCS | Mod: CPTII,S$GLB,, | Performed by: INTERNAL MEDICINE

## 2022-12-14 PROCEDURE — G0008 FLU VACCINE - QUADRIVALENT - ADJUVANTED: ICD-10-PCS | Mod: S$GLB,,, | Performed by: INTERNAL MEDICINE

## 2022-12-14 PROCEDURE — 99214 OFFICE O/P EST MOD 30 MIN: CPT | Mod: S$GLB,,, | Performed by: INTERNAL MEDICINE

## 2022-12-14 PROCEDURE — 1126F AMNT PAIN NOTED NONE PRSNT: CPT | Mod: CPTII,S$GLB,, | Performed by: INTERNAL MEDICINE

## 2022-12-14 PROCEDURE — 99499 UNLISTED E&M SERVICE: CPT | Mod: HCNC,S$GLB,, | Performed by: INTERNAL MEDICINE

## 2022-12-14 PROCEDURE — 3077F PR MOST RECENT SYSTOLIC BLOOD PRESSURE >= 140 MM HG: ICD-10-PCS | Mod: CPTII,S$GLB,, | Performed by: INTERNAL MEDICINE

## 2022-12-14 PROCEDURE — 1101F PT FALLS ASSESS-DOCD LE1/YR: CPT | Mod: CPTII,S$GLB,, | Performed by: INTERNAL MEDICINE

## 2022-12-14 PROCEDURE — 90694 FLU VACCINE - QUADRIVALENT - ADJUVANTED: ICD-10-PCS | Mod: S$GLB,,, | Performed by: INTERNAL MEDICINE

## 2022-12-14 PROCEDURE — 99499 RISK ADDL DX/OHS AUDIT: ICD-10-PCS | Mod: HCNC,S$GLB,, | Performed by: INTERNAL MEDICINE

## 2022-12-14 PROCEDURE — 1159F PR MEDICATION LIST DOCUMENTED IN MEDICAL RECORD: ICD-10-PCS | Mod: CPTII,S$GLB,, | Performed by: INTERNAL MEDICINE

## 2022-12-14 PROCEDURE — 3079F PR MOST RECENT DIASTOLIC BLOOD PRESSURE 80-89 MM HG: ICD-10-PCS | Mod: CPTII,S$GLB,, | Performed by: INTERNAL MEDICINE

## 2022-12-14 PROCEDURE — 1160F PR REVIEW ALL MEDS BY PRESCRIBER/CLIN PHARMACIST DOCUMENTED: ICD-10-PCS | Mod: CPTII,S$GLB,, | Performed by: INTERNAL MEDICINE

## 2022-12-14 PROCEDURE — 99999 PR PBB SHADOW E&M-EST. PATIENT-LVL IV: ICD-10-PCS | Mod: PBBFAC,,, | Performed by: INTERNAL MEDICINE

## 2022-12-14 PROCEDURE — 1159F MED LIST DOCD IN RCRD: CPT | Mod: CPTII,S$GLB,, | Performed by: INTERNAL MEDICINE

## 2022-12-14 PROCEDURE — G0008 ADMIN INFLUENZA VIRUS VAC: HCPCS | Mod: S$GLB,,, | Performed by: INTERNAL MEDICINE

## 2022-12-14 PROCEDURE — 90694 VACC AIIV4 NO PRSRV 0.5ML IM: CPT | Mod: S$GLB,,, | Performed by: INTERNAL MEDICINE

## 2022-12-14 PROCEDURE — 1160F RVW MEDS BY RX/DR IN RCRD: CPT | Mod: CPTII,S$GLB,, | Performed by: INTERNAL MEDICINE

## 2022-12-14 RX ORDER — INSULIN GLARGINE 100 [IU]/ML
10-15 INJECTION, SOLUTION SUBCUTANEOUS NIGHTLY
Qty: 30 ML | Refills: 3 | Status: SHIPPED | OUTPATIENT
Start: 2022-12-14 | End: 2022-12-15 | Stop reason: ALTCHOICE

## 2022-12-14 RX ORDER — ATORVASTATIN CALCIUM 40 MG/1
40 TABLET, FILM COATED ORAL NIGHTLY
Qty: 90 TABLET | Refills: 3 | Status: ON HOLD | OUTPATIENT
Start: 2022-12-14 | End: 2023-06-06 | Stop reason: HOSPADM

## 2022-12-14 RX ORDER — LANCETS 33 GAUGE
EACH MISCELLANEOUS
Qty: 200 EACH | Refills: 3 | Status: ON HOLD | OUTPATIENT
Start: 2022-12-14 | End: 2023-06-06 | Stop reason: HOSPADM

## 2022-12-14 RX ORDER — LISINOPRIL 5 MG/1
5 TABLET ORAL DAILY
Qty: 90 TABLET | Refills: 3 | Status: ON HOLD | OUTPATIENT
Start: 2022-12-14 | End: 2023-06-06 | Stop reason: HOSPADM

## 2022-12-14 RX ORDER — PANTOPRAZOLE SODIUM 40 MG/1
40 TABLET, DELAYED RELEASE ORAL DAILY
Qty: 90 TABLET | Refills: 3 | Status: ON HOLD | OUTPATIENT
Start: 2022-12-14 | End: 2023-06-06 | Stop reason: HOSPADM

## 2022-12-14 NOTE — PROGRESS NOTES
INTERNAL MEDICINE ESTABLISHED PATIENT VISIT NOTE    Subjective:     Chief Complaint: Follow-up       Patient ID: Sherin Ortiz is a 77 y.o. female with TIA, venous ulcers, PAD, HTN, HLD, CKD IV, ACD, IDDM (1/2022 HgbA1C 8.4), obesity, hyperparathyroidism, osteopenia, wheelchair dependence, last seen by me 6/2022, here today for follow-up.    Admitted 7/12/2022-7/19/2022. Per discharge summary: Presented with weakness and melena.  Admitted for GI bleed with symptomatic anemia.  Hemoglobin 6.7 on admission and transfused 1 unit packed red blood cells.  Treatment initiated with IV PPI, 2 large bore peripheral IVs in place and anti-platelets and anticoagulation held.  Monitored with serial CBCs.  GI consulted, and patient underwent EGD which showed patent Schatzki ring and gastritis but no active source of bleeding on 07/13/2022. Colonoscopy showed melena throughout the colon but no bleeding source identified on 7/14/2022. Further workup with cap capsule endoscopy showed active bleeding proximal small bowel on 7/15/2022. She underwent push enteroscopy on 7/18/2022 which was remarkable for dieulafoy lesion that was treated with APC and clips. She had no further bleeding and H/H was stable (Hgb 7.7 on day of discharge). She was arranged for home health with follow up with PCP and GI.    Readmitted briefly due to increased weakness, melena. Blood counts stable. SNF placement recommended and discharged to SNF.    12/2022 Cardiology - Status post bilateral greater saphenous ablation and sclerotherapy ankle accessory veins. Surveillance ultrasound to determine if there are any residual superficial veins with reflux that will require additional treatment. If there is no improvement we will discuss the option of deep venous evaluation + intervention.    Requesting medication refills today, has been out of insulin for past few weeks and also unable to check blood sugar. No recurrence of blood in stools.     Past Medical  History:  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    Benign essential hypertension 11/14/2012    Cataract     s/p phacoemulsification    Chronic kidney disease (CKD), stage III (moderate) 9/12/2013    Diabetic peripheral neuropathy associated with type 2 diabetes mellitus 11/14/2014    causing right hemiparesis    Gait disorder     Hyperlipidemia     Iritis - Both Eyes 6/10/2013    Kidney stone     Lymphedema     Morbid obesity with BMI of 40.0-44.9, adult 2/18/2015    Nephrolithiasis 4/20/2016    NS (nuclear sclerosis) 4/1/2013    Nuclear sclerosis - Both Eyes 4/29/2013    Preseptal cellulitis - Right Eye 4/29/2013    Proliferative diabetic retinopathy - Both Eyes 4/29/2013    Proliferative diabetic retinopathy, both eyes 4/1/2013    PSC (posterior subcapsular cataract) - Both Eyes 4/29/2013    S/P hernia repair 12/19/2012    TIA (transient ischemic attack) 11/18/2014    Tinea pedis 7/24/2012    Tinea pedis is present on both feet.     Type 2 diabetes mellitus with diabetic polyneuropathy, with long-term current use of insulin 9/18/2015    Type 2 diabetes mellitus with renal manifestations, controlled 12/12/2013    Type 2 diabetes, controlled, with moderate nonproliferative diabetic retinopathy without macular edema 9/17/2015    Ulcer of left lower extremity, limited to breakdown of skin 7/8/2015    Unspecified cerebral artery occlusion with cerebral infarction 11/16/2014    Unspecified venous (peripheral) insufficiency     Ureteral stone with hydronephrosis 1/27/2016    UTI (lower urinary tract infection)     Vaginal infection     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   HENT:  Negative for congestion.    Respiratory:  Negative for cough and shortness of breath.    Cardiovascular:  Negative for chest pain.   Gastrointestinal:  Negative for blood in stool,  "constipation, melena, nausea and vomiting.   Genitourinary:  Negative for hematuria and urgency.   Musculoskeletal:  Negative for falls.   Skin:  Negative for rash.   Neurological:  Negative for dizziness and loss of consciousness.     Health Maintenance:   Immunizations:   Influenza - today  Tdap - 12/2016  Covid 19 - discussed, advised to obtain  HPV  Prevnar rec at 63 - Prevnar 12/2017, Pneumovax 12/2008  Shingrix rec at 48 - next visit     Cancer Screening:  PAP: no hx of abnl pap  Mammogram:  defer  Colonoscopy:  7/2022 melena  DEXA:  ordered    Objective:   BP (!) 146/80 (BP Location: Left arm, Patient Position: Sitting, BP Method: Large (Manual))   Pulse 73   Ht 5' 7" (1.702 m)   Wt 122.5 kg (270 lb 1 oz)   LMP  (LMP Unknown) Comment: patient refused to weigh/wounds to legs and toes   SpO2 99%   BMI 42.30 kg/m²      Labs:       Assessment/Plan     History of gastrointestinal bleeding  Secondary to Dieulafoy lesion s/p APC, clips; no recurrence  Continue PPI; Plavix stopped on hospital discharge, continue holding  Check labs, refer to GI for follow-up  -     pantoprazole (PROTONIX) 40 MG tablet; Take 1 tablet (40 mg total) by mouth once daily.  Dispense: 90 tablet; Refill: 3  -     Ambulatory referral/consult to Gastroenterology; Future; Expected date: 12/21/2022    Dieulafoy lesion of intestine  -     pantoprazole (PROTONIX) 40 MG tablet; Take 1 tablet (40 mg total) by mouth once daily.  Dispense: 90 tablet; Refill: 3  -     CBC W/ AUTO DIFFERENTIAL; Future; Expected date: 12/14/2022  -     Iron and TIBC; Future; Expected date: 12/14/2022  -     Ferritin; Future; Expected date: 12/14/2022  -     Ambulatory referral/consult to Gastroenterology; Future; Expected date: 12/21/2022    PAOD (peripheral arterial occlusive disease)  Continue ASA, statin; Plavix held secondary to GIB     Venous ulcer-leg syndrome, bilateral  Follows with Cardiology    Type 2 diabetes mellitus with diabetic polyneuropathy, with " long-term current use of insulin  Likely uncontrolled as no recent administration of insulin  Discharged on Levemir, re-start Lantus per pt preference and repeat labs  -     blood sugar diagnostic (TRUE METRIX GLUCOSE TEST STRIP) Strp; TEST BLOOD SUGAR TWICE DAILY  Dispense: 200 strip; Refill: 3  -     Hemoglobin A1C; Future; Expected date: 12/14/2022  -     COMPREHENSIVE METABOLIC PANEL; Future; Expected date: 12/14/2022  -     lancets (TRUEPLUS LANCETS) 33 gauge Misc; TEST TWO TIMES DAILY  Dispense: 200 each; Refill: 3  -     insulin glargine (LANTUS U-100 INSULIN) 100 unit/mL injection; Inject 10-15 Units into the skin every evening. DEPENDING ON SCALE (DISCARD EACH VIAL AFTER 28 DAYS)  Dispense: 30 mL; Refill: 3    Essential hypertension  Acceptable, continue current regimen  -     lisinopriL (PRINIVIL,ZESTRIL) 5 MG tablet; Take 1 tablet (5 mg total) by mouth once daily.  Dispense: 90 tablet; Refill: 3    Hyperlipidemia LDL goal <100  Acceptable, continue current regimen  -     atorvastatin (LIPITOR) 40 MG tablet; Take 1 tablet (40 mg total) by mouth every evening.  Dispense: 90 tablet; Refill: 3    Wheelchair dependent    HM as above.    Paulina Calderon MD  Department of Internal Medicine - [facility]  12/14/2022

## 2022-12-16 ENCOUNTER — TELEPHONE (OUTPATIENT)
Dept: INTERNAL MEDICINE | Facility: CLINIC | Age: 77
End: 2022-12-16
Payer: MEDICARE

## 2022-12-16 NOTE — TELEPHONE ENCOUNTER
Were orders for wound supply placed for this pt?  Unable to locate orders for wound supply if placed     Please advise

## 2022-12-16 NOTE — TELEPHONE ENCOUNTER
----- Message from Jorge Jones sent at 12/16/2022  2:03 PM CST -----  Contact: Gege [phone]  Gege from [employer] requesting a call for status of fax for wound supply .    Ref# [id number]    Please call and advise

## 2022-12-16 NOTE — TELEPHONE ENCOUNTER
Assistance requested in scheduling pt with Dr. Baum in February for transfer care   all other ROS negative except as per HPI

## 2022-12-20 ENCOUNTER — PATIENT MESSAGE (OUTPATIENT)
Dept: NEPHROLOGY | Facility: CLINIC | Age: 77
End: 2022-12-20
Payer: MEDICARE

## 2022-12-21 ENCOUNTER — TELEPHONE (OUTPATIENT)
Dept: NEPHROLOGY | Facility: CLINIC | Age: 77
End: 2022-12-21
Payer: MEDICARE

## 2022-12-22 ENCOUNTER — LAB VISIT (OUTPATIENT)
Dept: LAB | Facility: HOSPITAL | Age: 77
End: 2022-12-22
Attending: NURSE PRACTITIONER
Payer: MEDICARE

## 2022-12-22 ENCOUNTER — TELEPHONE (OUTPATIENT)
Dept: INTERNAL MEDICINE | Facility: CLINIC | Age: 77
End: 2022-12-22
Payer: MEDICARE

## 2022-12-22 DIAGNOSIS — N18.4 CHRONIC KIDNEY DISEASE, STAGE IV (SEVERE): Primary | ICD-10-CM

## 2022-12-22 LAB
ALBUMIN SERPL BCP-MCNC: 3 G/DL (ref 3.5–5.2)
ANION GAP SERPL CALC-SCNC: 9 MMOL/L (ref 8–16)
BASOPHILS # BLD AUTO: 0.01 K/UL (ref 0–0.2)
BASOPHILS NFR BLD: 0.3 % (ref 0–1.9)
BUN SERPL-MCNC: 25 MG/DL (ref 8–23)
CALCIUM SERPL-MCNC: 8.7 MG/DL (ref 8.7–10.5)
CHLORIDE SERPL-SCNC: 103 MMOL/L (ref 95–110)
CO2 SERPL-SCNC: 19 MMOL/L (ref 23–29)
CREAT SERPL-MCNC: 2.1 MG/DL (ref 0.5–1.4)
CREAT UR-MCNC: 34 MG/DL (ref 15–325)
DIFFERENTIAL METHOD: ABNORMAL
EOSINOPHIL # BLD AUTO: 0 K/UL (ref 0–0.5)
EOSINOPHIL NFR BLD: 0.9 % (ref 0–8)
ERYTHROCYTE [DISTWIDTH] IN BLOOD BY AUTOMATED COUNT: 17.4 % (ref 11.5–14.5)
EST. GFR  (NO RACE VARIABLE): 23.5 ML/MIN/1.73 M^2
GLUCOSE SERPL-MCNC: 453 MG/DL (ref 70–110)
HCT VFR BLD AUTO: 31.7 % (ref 37–48.5)
HGB BLD-MCNC: 9.6 G/DL (ref 12–16)
IMM GRANULOCYTES # BLD AUTO: 0.01 K/UL (ref 0–0.04)
IMM GRANULOCYTES NFR BLD AUTO: 0.3 % (ref 0–0.5)
LYMPHOCYTES # BLD AUTO: 0.8 K/UL (ref 1–4.8)
LYMPHOCYTES NFR BLD: 23.8 % (ref 18–48)
MCH RBC QN AUTO: 25.3 PG (ref 27–31)
MCHC RBC AUTO-ENTMCNC: 30.3 G/DL (ref 32–36)
MCV RBC AUTO: 83 FL (ref 82–98)
MONOCYTES # BLD AUTO: 0.2 K/UL (ref 0.3–1)
MONOCYTES NFR BLD: 7.1 % (ref 4–15)
NEUTROPHILS # BLD AUTO: 2.3 K/UL (ref 1.8–7.7)
NEUTROPHILS NFR BLD: 67.6 % (ref 38–73)
NRBC BLD-RTO: 0 /100 WBC
PHOSPHATE SERPL-MCNC: 3.3 MG/DL (ref 2.7–4.5)
PLATELET # BLD AUTO: 281 K/UL (ref 150–450)
PMV BLD AUTO: 10 FL (ref 9.2–12.9)
POTASSIUM SERPL-SCNC: 5.6 MMOL/L (ref 3.5–5.1)
PROT UR-MCNC: 107 MG/DL (ref 0–15)
PROT/CREAT UR: 3.15 MG/G{CREAT} (ref 0–0.2)
PTH-INTACT SERPL-MCNC: 361.7 PG/ML (ref 9–77)
RBC # BLD AUTO: 3.8 M/UL (ref 4–5.4)
SODIUM SERPL-SCNC: 131 MMOL/L (ref 136–145)
WBC # BLD AUTO: 3.4 K/UL (ref 3.9–12.7)

## 2022-12-22 PROCEDURE — 85025 COMPLETE CBC W/AUTO DIFF WBC: CPT | Mod: HCNC | Performed by: NURSE PRACTITIONER

## 2022-12-22 PROCEDURE — 80069 RENAL FUNCTION PANEL: CPT | Mod: HCNC | Performed by: NURSE PRACTITIONER

## 2022-12-22 PROCEDURE — 36415 COLL VENOUS BLD VENIPUNCTURE: CPT | Mod: HCNC | Performed by: NURSE PRACTITIONER

## 2022-12-22 PROCEDURE — 84156 ASSAY OF PROTEIN URINE: CPT | Mod: HCNC | Performed by: NURSE PRACTITIONER

## 2022-12-22 PROCEDURE — 83970 ASSAY OF PARATHORMONE: CPT | Mod: HCNC | Performed by: NURSE PRACTITIONER

## 2022-12-22 NOTE — TELEPHONE ENCOUNTER
Fax received from [employer] requesting a signature for patient supplies    Faxed document placed in sign tray for review

## 2022-12-23 ENCOUNTER — TELEPHONE (OUTPATIENT)
Dept: INTERNAL MEDICINE | Facility: CLINIC | Age: 77
End: 2022-12-23
Payer: MEDICARE

## 2022-12-23 ENCOUNTER — TELEPHONE (OUTPATIENT)
Dept: NEPHROLOGY | Facility: CLINIC | Age: 77
End: 2022-12-23
Payer: MEDICARE

## 2022-12-23 DIAGNOSIS — E87.20 ACIDOSIS: ICD-10-CM

## 2022-12-23 DIAGNOSIS — E87.5 HYPERKALEMIA: Primary | ICD-10-CM

## 2022-12-23 RX ORDER — SODIUM BICARBONATE 650 MG/1
650 TABLET ORAL 2 TIMES DAILY
Qty: 28 TABLET | Refills: 0 | Status: SHIPPED | OUTPATIENT
Start: 2022-12-23 | End: 2023-02-03

## 2022-12-23 NOTE — TELEPHONE ENCOUNTER
Results of renal function panel discussed with patient's granddaughter, Elise (phone: [phone]). Noted hyperkalemia, acidosis, hyperglycemia on labs. Patient has not answered home or cell phone ([phone]). Plans for family member to check on patient at home. Rx sent for lokelma qod, NaBicarb BID. Plan for repeat BMP in 1 week. Instructed to hold lisinopril. Monitor CBG at home. Instructed to call office for any concerns. Elise verbalized understanding.

## 2022-12-23 NOTE — TELEPHONE ENCOUNTER
Patient returned phone call. Denies new symptoms. Notes that she was out of her insulin for 2 weeks but has restarted today. Encouraged to check CBG and report if remains significantly elevated > 300. Discussed lab result findings and Rx for lokelma + NaBicarb. BMP 1 week. Verbalizes understanding.

## 2022-12-23 NOTE — TELEPHONE ENCOUNTER
Called patient: Sherin Ortiz  Phone number:  [phone] and [phone] (daughter)    To discuss the following lab results:   CMP  Sodium   Date Value Ref Range Status   12/22/2022 131 (L) 136 - 145 mmol/L Final     Potassium   Date Value Ref Range Status   12/22/2022 5.6 (H) 3.5 - 5.1 mmol/L Final     Comment:     *No Visible Hemolysis     Chloride   Date Value Ref Range Status   12/22/2022 103 95 - 110 mmol/L Final     CO2   Date Value Ref Range Status   12/22/2022 19 (L) 23 - 29 mmol/L Final     Glucose   Date Value Ref Range Status   12/22/2022 453 (HH) 70 - 110 mg/dL Final     Comment:     *Critical value notification by KGwith confirmation of receipt to   dr. shankar vaughn at [id number]       BUN   Date Value Ref Range Status   12/22/2022 25 (H) 8 - 23 mg/dL Final     Creatinine   Date Value Ref Range Status   12/22/2022 2.1 (H) 0.5 - 1.4 mg/dL Final     Calcium   Date Value Ref Range Status   12/22/2022 8.7 8.7 - 10.5 mg/dL Final     Total Protein   Date Value Ref Range Status   07/20/2022 5.8 (L) 6.0 - 8.4 g/dL Final     Albumin   Date Value Ref Range Status   12/22/2022 3.0 (L) 3.5 - 5.2 g/dL Final     Anion Gap   Date Value Ref Range Status   12/22/2022 9 8 - 16 mmol/L Final     eGFR   Date Value Ref Range Status   12/22/2022 23.5 (A) >60 mL/min/1.73 m^2 Final         left voicemail for patient to return phone call and also left message with her daughter   Jazmyn Blanchard   [phone] (Mobile)    Chart routed to HERMES Vaughn M.D.   Nephrology  [facility]

## 2022-12-23 NOTE — TELEPHONE ENCOUNTER
Fax received from [employer] requesting a signature for patient supplies on 12/22/22    Fax signed     Faxed document back to [employer] at provided number. Filed in MA's drawer

## 2022-12-27 ENCOUNTER — TELEPHONE (OUTPATIENT)
Dept: INTERNAL MEDICINE | Facility: CLINIC | Age: 77
End: 2022-12-27
Payer: MEDICARE

## 2022-12-27 NOTE — TELEPHONE ENCOUNTER
----- Message from Sharyn Gee sent at 12/27/2022  9:32 AM CST -----  Pharmacy is calling to clarify an RX.  RX name:  insulin detemir U-100 (LEVEMIR) 100 unit/mL injection  What do they need to clarify:  please send in new fax of rx or call in please. The original rx faxed is not legible. Please call and advise  Comments:    [facility] - [place], [place] - [address]  [address]  [facility] [address]  Phone: [phone] Fax: [phone]

## 2022-12-28 ENCOUNTER — TELEPHONE (OUTPATIENT)
Dept: NEPHROLOGY | Facility: CLINIC | Age: 77
End: 2022-12-28
Payer: MEDICARE

## 2022-12-28 NOTE — TELEPHONE ENCOUNTER
----- Message from Jody Murray NP sent at 12/28/2022  2:05 PM CST -----  Sorry if this is duplicate. Needs f/u. Pls assist with scheduling.

## 2022-12-30 ENCOUNTER — PATIENT MESSAGE (OUTPATIENT)
Dept: NEPHROLOGY | Facility: CLINIC | Age: 77
End: 2022-12-30
Payer: MEDICARE

## 2022-12-30 DIAGNOSIS — E87.5 HYPERKALEMIA: Primary | ICD-10-CM

## 2022-12-30 RX ORDER — BUMETANIDE 0.5 MG/1
0.5 TABLET ORAL DAILY
Qty: 30 TABLET | Refills: 0 | Status: SHIPPED | OUTPATIENT
Start: 2022-12-30 | End: 2022-12-30

## 2022-12-30 RX ORDER — BUMETANIDE 0.5 MG/1
TABLET ORAL
Qty: 30 TABLET | Refills: 0 | Status: SHIPPED | OUTPATIENT
Start: 2022-12-30 | End: 2023-02-03

## 2022-12-30 RX ORDER — BUMETANIDE 0.5 MG/1
TABLET ORAL
Qty: 90 TABLET | OUTPATIENT
Start: 2022-12-30

## 2022-12-30 NOTE — TELEPHONE ENCOUNTER
----- Message from Jody Murray NP sent at 12/30/2022  1:30 PM CST -----  Her bloodwork was yesterday. Can you find out if/when she started the lokelma?    If she has started the lokelma already, we can recheck labs on 1/5 as planned and see if the potassium is better. If she has not started, she can start now and repeat labs as planned.    Do not  bumex yet. We will decide if she needs it after labs result.    Jody      ----- Message -----  From: Nayeli Neumann MA  Sent: 12/30/2022   1:26 PM CST  To: Jody Murray NP    Said she did get the lokelma until Tuesday, did bloodwork prior to staring the lokelma. Do you still want her to take the Bumex? Pt stated that she received a phone call from the pharmacy and they are waiting on the Rx to be Ok'd by the provider  ----- Message -----  From: Jody Murray NP  Sent: 12/30/2022  10:48 AM CST  To: Nayeli Neumann MA    Pls call pt or granddaughter. Notify her that potassium is still high. I would like her to take a bumex (fluid pill) once a day for the next week. She should hydrate well with this. It will make her urinate out extra potassium. It can sometimes have a cross-allergy reaction with sulfa, but she tolerated it in 2021. Repeat BMP next Thursday. Order is in.    If you have any emergency symptoms of high potassium (Shortness of breath, chest pain, palpitations of your heart, weakness, or muscle twitching), please report to the nearest ER.    Please make sure you continue to hydrate well with water and please eat a low potassium diet (avoid orange juice, crystal light lemonade, tomatoes, potatoes, avocados, bananas).    Thanks,  Jody

## 2022-12-30 NOTE — TELEPHONE ENCOUNTER
----- Message from Jody Murray NP sent at 12/30/2022 10:43 AM CST -----  Pls call pt or granddaughter. Notify her that potassium is still high. I would like her to take a bumex (fluid pill) once a day for the next week. She should hydrate well with this. It will make her urinate out extra potassium. It can sometimes have a cross-allergy reaction with sulfa, but she tolerated it in 2021. Repeat BMP next Thursday. Order is in.    If you have any emergency symptoms of high potassium (Shortness of breath, chest pain, palpitations of your heart, weakness, or muscle twitching), please report to the nearest ER.    Please make sure you continue to hydrate well with water and please eat a low potassium diet (avoid orange juice, crystal light lemonade, tomatoes, potatoes, avocados, bananas).    Thanks,  Jody

## 2023-01-01 NOTE — TELEPHONE ENCOUNTER
----- Message from Kim Berry sent at 2/20/2017  4:21 PM CST -----  Contact: TOMMY/Marti/[phone]  Type: Home Health (orders)    Home Health Agency/Nurse:[facility]    Phone [phone]     Reason for call:needing order    Comments:Marti said that she is calling in regards to needing to get  an order to re-certify pt for Home Health for wound care to the right leg and right foot for two times a week, pt is also requesting an order for a  bedside commode. Please call and advise      Thank you    Huang Warren

## 2023-01-05 ENCOUNTER — HOSPITAL ENCOUNTER (OUTPATIENT)
Dept: WOUND CARE | Facility: HOSPITAL | Age: 78
Discharge: HOME | End: 2023-01-05
Attending: SURGERY
Payer: MEDICARE

## 2023-01-05 VITALS
HEIGHT: 67 IN | DIASTOLIC BLOOD PRESSURE: 66 MMHG | WEIGHT: 270 LBS | BODY MASS INDEX: 42.38 KG/M2 | TEMPERATURE: 97 F | HEART RATE: 86 BPM | SYSTOLIC BLOOD PRESSURE: 151 MMHG

## 2023-01-05 DIAGNOSIS — E11.42 TYPE 2 DIABETES MELLITUS WITH DIABETIC POLYNEUROPATHY, WITH LONG-TERM CURRENT USE OF INSULIN: ICD-10-CM

## 2023-01-05 DIAGNOSIS — I83.029 VENOUS ULCER OF BOTH LOWER EXTREMITIES WITH VARICOSE VEINS: ICD-10-CM

## 2023-01-05 DIAGNOSIS — I87.2 VENOUS INSUFFICIENCY OF BOTH LOWER EXTREMITIES: ICD-10-CM

## 2023-01-05 DIAGNOSIS — L97.521 ULCER OF GREAT TOE, LEFT, LIMITED TO BREAKDOWN OF SKIN: ICD-10-CM

## 2023-01-05 DIAGNOSIS — I87.2 VENOUS REFLUX: Primary | ICD-10-CM

## 2023-01-05 DIAGNOSIS — Z79.4 TYPE 2 DIABETES MELLITUS WITH DIABETIC POLYNEUROPATHY, WITH LONG-TERM CURRENT USE OF INSULIN: ICD-10-CM

## 2023-01-05 DIAGNOSIS — L97.919 VENOUS ULCER OF BOTH LOWER EXTREMITIES WITH VARICOSE VEINS: ICD-10-CM

## 2023-01-05 DIAGNOSIS — E66.01 OBESITY, MORBID (MORE THAN 100 LBS OVER IDEAL WEIGHT OR BMI > 40): ICD-10-CM

## 2023-01-05 DIAGNOSIS — I83.019 VENOUS ULCER OF BOTH LOWER EXTREMITIES WITH VARICOSE VEINS: ICD-10-CM

## 2023-01-05 DIAGNOSIS — I89.0 LYMPHEDEMA: ICD-10-CM

## 2023-01-05 DIAGNOSIS — L97.929 VENOUS ULCER OF BOTH LOWER EXTREMITIES WITH VARICOSE VEINS: ICD-10-CM

## 2023-01-05 DIAGNOSIS — R26.2 INABILITY TO WALK: ICD-10-CM

## 2023-01-05 PROCEDURE — 29581 APPL MULTLAYER CMPRN SYS LEG: CPT | Mod: HCNC,50

## 2023-01-05 NOTE — PROGRESS NOTES
[facility]    Subjective:       Patient ID: Sherin Ortiz is a 78 y.o. female.    Chief Complaint: Wound Care    Patient to wound care for BLE wounds.  No new concerns, no new c/o, continue with the current plan of care.   New updated measurements (taken by 2 wound care nurses) and orders placed again for Circaid stockings taken today.    Left ankle 10 1/2, left calf 18, left ankle to calf 9, left heel to knee 15  Right ankle 11, right calf 18, right ankle to calf 9, right heel to knee 15    Review of Systems   All systems were reviewed and are negative, except that mentioned in the HPI.    Objective:     Vitals:    01/05/23 1355   BP: (!) 151/66   Pulse: 86   Temp: 97.4 °F (36.3 °C)         Physical Exam  Constitutional:       Appearance: She is well-developed.   HENT:      Head: Normocephalic and atraumatic.   Eyes:      Pupils: Pupils are equal, round, and reactive to light.   Cardiovascular:      Rate and Rhythm: Normal rate.   Pulmonary:      Effort: Pulmonary effort is normal. No respiratory distress.      Breath sounds: No stridor.   Abdominal:      General: There is no distension.   Musculoskeletal:      Cervical back: Normal range of motion.   Skin:     Comments: See Synopsis for wound details   Neurological:      Mental Status: She is alert and oriented to person, place, and time.          Wound 08/18/22 1400 Other (comment) Left lower Leg (Active)   08/18/22 1400    Pre-existing: Yes   Primary Wound Type: Other   Side: Left   Orientation: lower   Location: Leg   Wound Number:    Ankle-Brachial Index:    Pulses: doppler   Removal Indication and Assessment:    Wound Outcome:    (Retired) Wound Type:    (Retired) Wound Length (cm):    (Retired) Wound Width (cm):    (Retired) Depth (cm):    Wound Description (Comments):    Removal Indications:    Wound Image   01/05/23 1328   Dressing Appearance Intact;Moist drainage 01/05/23 1328   Drainage Amount  Moderate 01/05/23 1328   Drainage Characteristics/Odor Serosanguineous 01/05/23 1328   Appearance Intact;Pink;Red 01/05/23 1328   Tissue loss description Partial thickness 01/05/23 1328   Red (%), Wound Tissue Color 100 % 01/05/23 1328   Periwound Area Intact;Edematous 01/05/23 1328   Wound Edges Irregular 01/05/23 1328   Wound Length (cm) 2 cm 01/05/23 1328   Wound Width (cm) 1.5 cm 01/05/23 1328   Wound Depth (cm) 0.1 cm 01/05/23 1328   Wound Volume (cm^3) 0.3 cm^3 01/05/23 1328   Wound Surface Area (cm^2) 3 cm^2 01/05/23 1328   Care Cleansed with:;Soap and water;Sterile normal saline 01/05/23 1328   Dressing Applied;Changed;Non-adherent;Compression wrap;Absorptive Pad 01/05/23 1328   Periwound Care Absorptive dressing applied 01/05/23 1328   Compression Two layer compression 01/05/23 1328   Dressing Change Due 01/12/23 01/05/23 1328            Wound 08/18/22 1400 Other (comment) Right lower Leg (Active)   08/18/22 1400    Pre-existing: Yes   Primary Wound Type: Other   Side: Right   Orientation: lower   Location: Leg   Wound Number:    Ankle-Brachial Index:    Pulses: doppler   Removal Indication and Assessment:    Wound Outcome:    (Retired) Wound Type:    (Retired) Wound Length (cm):    (Retired) Wound Width (cm):    (Retired) Depth (cm):    Wound Description (Comments):    Removal Indications:    Wound Image    01/05/23 1328   Dressing Appearance Intact;Moist drainage 01/05/23 1328   Drainage Amount Moderate 01/05/23 1328   Drainage Characteristics/Odor Serosanguineous 01/05/23 1328   Appearance Pink;Intact;Red 01/05/23 1328   Tissue loss description Partial thickness 01/05/23 1328   Red (%), Wound Tissue Color 100 % 01/05/23 1328   Periwound Area Intact;Edematous 01/05/23 1328   Wound Length (cm) 2.1 cm 01/05/23 1328   Wound Width (cm) 1.8 cm 01/05/23 1328   Wound Depth (cm) 0.2 cm 01/05/23 1328   Wound Volume (cm^3) 0.756 cm^3 01/05/23 1328   Wound Surface Area (cm^2) 3.78 cm^2 01/05/23 1328   Care Cleansed  "with:;Soap and water 01/05/23 1328   Dressing Applied;Changed;Non-adherent;Absorptive Pad;Compression wrap 01/05/23 1328   Periwound Care Absorptive dressing applied 01/05/23 1328   Compression Two layer compression 01/05/23 1328   Dressing Change Due 01/12/23 01/05/23 1328         Assessment/Plan:         ICD-10-CM ICD-9-CM   1. Venous reflux  I87.2 459.81   2. Venous insufficiency of both lower extremities  I87.2 459.81   3. Lymphedema  I89.0 457.1   4. Type 2 diabetes mellitus with diabetic polyneuropathy, with long-term current use of insulin  E11.42 250.60    Z79.4 357.2     V58.67   5. Venous ulcer of both lower extremities with varicose veins  I83.019 454.0    I83.029     L97.919     L97.929    6. Obesity, morbid (more than 100 lbs over ideal weight or BMI > 40)  E66.01 278.01   7. Ulcer of great toe, left, limited to breakdown of skin  L97.521 707.15   8. Inability to walk  R26.2 719.7           Tissue pathology and/or culture taken:  [] Yes [x] No   Sharp debridement performed:   [] Yes [x] No   Labs ordered this visit:   [] Yes [x] No   Imaging ordered this visit:   [] Yes [x] No           Orders Placed This Encounter   Procedures    HME - OTHER     Order Specific Question:   Type of Equipment:     Answer:   CircAid bilateral knee high with socks     Order Specific Question:   Height:     Answer:   5' 7" (1.702 m)     Order Specific Question:   Weight:     Answer:   122.5 kg (270 lb)     Order Specific Question:   Does patient have medical equipment at home?     Answer:   wheelchair    Change dressing     Right and left lower legs:     Cleanse wound with: Normal Saline   Lidocaine:PRN   Primary dressing: Xeroform to any open wounds     Edema control: Co-flex with calamine to BLE toes to knee, protect bilateral toes with small abd pad     Follow-up:  Dr. Perry 3 weeks   Home Health: [facility]: Please use supplies as per order above. Change dressings weekly.     Other Orders: [employer] has Circ " Aid order, measurement taken today.        Follow up in about 3 weeks (around 1/26/2023).

## 2023-01-10 ENCOUNTER — TELEPHONE (OUTPATIENT)
Dept: NEPHROLOGY | Facility: CLINIC | Age: 78
End: 2023-01-10
Payer: MEDICARE

## 2023-01-10 NOTE — TELEPHONE ENCOUNTER
----- Message from Ayla Rondon sent at 1/10/2023 11:39 AM CST -----  Sherin Ortiz calling regarding Orders for lab work per the doctor's request and she wanted it to be done by the [facility] home health team that will be coming on Thursday, their fax number if needed is [phone]...call pt is needed  [phone]

## 2023-01-11 ENCOUNTER — EXTERNAL HOME HEALTH (OUTPATIENT)
Dept: HOME HEALTH SERVICES | Facility: HOSPITAL | Age: 78
End: 2023-01-11
Payer: MEDICARE

## 2023-01-12 ENCOUNTER — LAB VISIT (OUTPATIENT)
Dept: LAB | Facility: HOSPITAL | Age: 78
End: 2023-01-12
Attending: INTERNAL MEDICINE
Payer: MEDICARE

## 2023-01-12 DIAGNOSIS — I11.0 HYPERTENSIVE HEART DISEASE WITH CONGESTIVE HEART FAILURE: Primary | ICD-10-CM

## 2023-01-12 LAB
ANION GAP SERPL CALC-SCNC: 10 MMOL/L (ref 8–16)
BUN SERPL-MCNC: 34 MG/DL (ref 8–23)
CALCIUM SERPL-MCNC: 9.5 MG/DL (ref 8.7–10.5)
CHLORIDE SERPL-SCNC: 113 MMOL/L (ref 95–110)
CO2 SERPL-SCNC: 18 MMOL/L (ref 23–29)
CREAT SERPL-MCNC: 1.8 MG/DL (ref 0.5–1.4)
EST. GFR  (NO RACE VARIABLE): 28.1 ML/MIN/1.73 M^2
GLUCOSE SERPL-MCNC: 76 MG/DL (ref 70–110)
POTASSIUM SERPL-SCNC: 5.1 MMOL/L (ref 3.5–5.1)
SODIUM SERPL-SCNC: 141 MMOL/L (ref 136–145)

## 2023-01-12 PROCEDURE — 80048 BASIC METABOLIC PNL TOTAL CA: CPT | Mod: HCNC | Performed by: INTERNAL MEDICINE

## 2023-01-19 ENCOUNTER — TELEPHONE (OUTPATIENT)
Dept: INTERNAL MEDICINE | Facility: CLINIC | Age: 78
End: 2023-01-19
Payer: MEDICARE

## 2023-01-19 ENCOUNTER — TELEPHONE (OUTPATIENT)
Dept: NEPHROLOGY | Facility: CLINIC | Age: 78
End: 2023-01-19
Payer: MEDICARE

## 2023-01-19 ENCOUNTER — PATIENT MESSAGE (OUTPATIENT)
Dept: NEPHROLOGY | Facility: CLINIC | Age: 78
End: 2023-01-19
Payer: MEDICARE

## 2023-01-19 NOTE — TELEPHONE ENCOUNTER
----- Message from Teresa Blandon sent at 1/19/2023 11:19 AM CST -----  Regarding: Appt Access  Contact: [phone]  Pt would like to r/s her appt for 02/03/23 to another date.  I tried r/s but I was given a message to send an in basket msg.  Please call.

## 2023-01-26 ENCOUNTER — DOCUMENT SCAN (OUTPATIENT)
Dept: HOME HEALTH SERVICES | Facility: HOSPITAL | Age: 78
End: 2023-01-26
Payer: MEDICARE

## 2023-01-26 ENCOUNTER — HOSPITAL ENCOUNTER (OUTPATIENT)
Dept: WOUND CARE | Facility: HOSPITAL | Age: 78
Discharge: HOME | End: 2023-01-26
Attending: SURGERY
Payer: MEDICARE

## 2023-01-26 VITALS
SYSTOLIC BLOOD PRESSURE: 212 MMHG | TEMPERATURE: 98 F | HEART RATE: 88 BPM | WEIGHT: 270 LBS | DIASTOLIC BLOOD PRESSURE: 92 MMHG | HEIGHT: 67 IN | BODY MASS INDEX: 42.38 KG/M2

## 2023-01-26 DIAGNOSIS — L97.929 VENOUS ULCER OF BOTH LOWER EXTREMITIES WITH VARICOSE VEINS: ICD-10-CM

## 2023-01-26 DIAGNOSIS — I87.2 VENOUS INSUFFICIENCY OF BOTH LOWER EXTREMITIES: Primary | ICD-10-CM

## 2023-01-26 DIAGNOSIS — E11.42 TYPE 2 DIABETES MELLITUS WITH DIABETIC POLYNEUROPATHY, WITH LONG-TERM CURRENT USE OF INSULIN: ICD-10-CM

## 2023-01-26 DIAGNOSIS — I83.029 VENOUS ULCER OF BOTH LOWER EXTREMITIES WITH VARICOSE VEINS: ICD-10-CM

## 2023-01-26 DIAGNOSIS — I83.019 VENOUS ULCER OF BOTH LOWER EXTREMITIES WITH VARICOSE VEINS: ICD-10-CM

## 2023-01-26 DIAGNOSIS — Z79.4 TYPE 2 DIABETES MELLITUS WITH DIABETIC POLYNEUROPATHY, WITH LONG-TERM CURRENT USE OF INSULIN: ICD-10-CM

## 2023-01-26 DIAGNOSIS — I89.0 LYMPHEDEMA: ICD-10-CM

## 2023-01-26 DIAGNOSIS — L97.919 VENOUS ULCER OF BOTH LOWER EXTREMITIES WITH VARICOSE VEINS: ICD-10-CM

## 2023-01-26 PROCEDURE — 29581 APPL MULTLAYER CMPRN SYS LEG: CPT | Mod: HCNC

## 2023-01-26 NOTE — PROGRESS NOTES
[facility]    Subjective:       Patient ID: Sherin Ortiz is a 78 y.o. female.    Chief Complaint: Wound Care    Wounds stable. Patient states that she has appt with [facility] for the circaid compression. Instructed her on plan of care and she voiced understanding. Sent updated orders to home health.    Review of Systems   All systems were reviewed and are negative, except that mentioned in the HPI.    Objective:     Vitals:    01/26/23 1446   BP: (!) 212/92   Pulse: 88   Temp: 98.4 °F (36.9 °C)         Physical Exam  Constitutional:       Appearance: She is well-developed.   HENT:      Head: Normocephalic and atraumatic.   Eyes:      Pupils: Pupils are equal, round, and reactive to light.   Cardiovascular:      Rate and Rhythm: Normal rate.   Pulmonary:      Effort: Pulmonary effort is normal. No respiratory distress.      Breath sounds: No stridor.   Abdominal:      General: There is no distension.   Musculoskeletal:      Cervical back: Normal range of motion.   Skin:     Comments: See Synopsis for wound details   Neurological:      Mental Status: She is alert and oriented to person, place, and time.          Wound 08/18/22 1400 Other (comment) Left lower Leg (Active)   08/18/22 1400    Pre-existing: Yes   Primary Wound Type: Other   Side: Left   Orientation: lower   Location: Leg   Wound Number:    Ankle-Brachial Index:    Pulses: doppler   Removal Indication and Assessment:    Wound Outcome:    (Retired) Wound Type:    (Retired) Wound Length (cm):    (Retired) Wound Width (cm):    (Retired) Depth (cm):    Wound Description (Comments):    Removal Indications:    Dressing Appearance Intact;Moist drainage 01/26/23 1327   Drainage Amount Small 01/26/23 1327   Drainage Characteristics/Odor Serosanguineous 01/26/23 1327   Appearance Pink 01/26/23 1327   Tissue loss description Partial thickness 01/26/23 1327   Red (%), Wound Tissue Color 100 % 01/26/23 1327    Periwound Area Intact;Edematous 01/26/23 1327   Wound Edges Irregular 01/26/23 1327   Wound Length (cm) 1 cm 01/26/23 1327   Wound Width (cm) 1 cm 01/26/23 1327   Wound Depth (cm) 0.1 cm 01/26/23 1327   Wound Volume (cm^3) 0.1 cm^3 01/26/23 1327   Wound Surface Area (cm^2) 1 cm^2 01/26/23 1327   Care Cleansed with:;Sterile normal saline 01/26/23 1327   Dressing Applied;Hydrofiber;Compression wrap 01/26/23 1327   Compression Two layer compression 01/26/23 1327   Dressing Change Due 01/28/23 01/26/23 1327            Wound 08/18/22 1400 Other (comment) Right lower Leg (Active)   08/18/22 1400    Pre-existing: Yes   Primary Wound Type: Other   Side: Right   Orientation: lower   Location: Leg   Wound Number:    Ankle-Brachial Index:    Pulses: doppler   Removal Indication and Assessment:    Wound Outcome:    (Retired) Wound Type:    (Retired) Wound Length (cm):    (Retired) Wound Width (cm):    (Retired) Depth (cm):    Wound Description (Comments):    Removal Indications:    Dressing Appearance Intact;Moist drainage 01/26/23 1327   Drainage Amount Small 01/26/23 1327   Drainage Characteristics/Odor Serosanguineous 01/26/23 1327   Appearance Pink 01/26/23 1327   Tissue loss description Partial thickness 01/26/23 1327   Red (%), Wound Tissue Color 100 % 01/26/23 1327   Periwound Area Intact;Edematous 01/26/23 1327   Wound Length (cm) 1.5 cm 01/26/23 1327   Wound Width (cm) 1.2 cm 01/26/23 1327   Wound Depth (cm) 0.1 cm 01/26/23 1327   Wound Volume (cm^3) 0.18 cm^3 01/26/23 1327   Wound Surface Area (cm^2) 1.8 cm^2 01/26/23 1327   Care Cleansed with:;Sterile normal saline 01/26/23 1327   Dressing Applied;Hydrofiber;Compression wrap 01/26/23 1327   Compression Two layer compression 01/26/23 1327   Dressing Change Due 01/28/23 01/26/23 1327         Assessment/Plan:         ICD-10-CM ICD-9-CM   1. Venous insufficiency of both lower extremities  I87.2 459.81   2. Lymphedema  I89.0 457.1   3. Type 2 diabetes mellitus with  diabetic polyneuropathy, with long-term current use of insulin  E11.42 250.60    Z79.4 357.2     V58.67   4. Venous ulcer of both lower extremities with varicose veins  I83.019 454.0    I83.029     L97.919     L97.929            Tissue pathology and/or culture taken:  [] Yes [x] No   Sharp debridement performed:   [] Yes [x] No   Labs ordered this visit:   [] Yes [x] No   Imaging ordered this visit:   [] Yes [x] No           Orders Placed This Encounter   Procedures    Change dressing     Change dressing [VOD419] (Order [id number])  Nursing  Date: 1/5/2023 Department: [facility] Ordering/Authorizing: Gretta Perry DO    Administration Details     suggestion  The administrations shown are only for this specific order and not for other orders for the same medication that may be in this encounter.    No Administrations Recorded          Order Providers    Authorizing Encounter  Gretta Perry         Associated Diagnoses    Venous ulcer of both lower extremities with varicose veins         Comments    Right and left lower legs:     Cleanse wound with: Normal Saline   Lidocaine:PRN   Primary dressing: Hydrafera ready to any open wounds     Edema control: Co-flex with calamine to BLE toes to knee, protect bilateral toes with small abd pad     Follow-up:  Dr. Perry 3 weeks   Home Health: [facility]: Please use supplies as per order above. Change dressings weekly.                        Additional Information    Associated Reports  View Encounter  Priority and Order Details        Follow up in about 3 weeks (around 2/16/2023).

## 2023-01-28 ENCOUNTER — PATIENT MESSAGE (OUTPATIENT)
Dept: ADMINISTRATIVE | Facility: HOSPITAL | Age: 78
End: 2023-01-28
Payer: MEDICARE

## 2023-02-01 ENCOUNTER — TELEPHONE (OUTPATIENT)
Dept: INTERNAL MEDICINE | Facility: CLINIC | Age: 78
End: 2023-02-01
Payer: MEDICARE

## 2023-02-01 NOTE — TELEPHONE ENCOUNTER
----- Message from Bhavana Gates sent at 2/1/2023  1:20 PM CST -----  Contact: danita/juan/[phone]  Rep called in regard to checking the status of the pt  order for repairs to the pt chair. The paper work was sent over on 1-27-23. Call back    Please advise

## 2023-02-03 ENCOUNTER — OFFICE VISIT (OUTPATIENT)
Dept: NEPHROLOGY | Facility: CLINIC | Age: 78
End: 2023-02-03
Payer: MEDICARE

## 2023-02-03 ENCOUNTER — PATIENT MESSAGE (OUTPATIENT)
Dept: INTERNAL MEDICINE | Facility: CLINIC | Age: 78
End: 2023-02-03
Payer: MEDICARE

## 2023-02-03 DIAGNOSIS — D64.9 ANEMIA, UNSPECIFIED TYPE: ICD-10-CM

## 2023-02-03 DIAGNOSIS — N18.4 CHRONIC KIDNEY DISEASE, STAGE 4 (SEVERE): Primary | ICD-10-CM

## 2023-02-03 PROCEDURE — 1159F MED LIST DOCD IN RCRD: CPT | Mod: HCNC,CPTII,95, | Performed by: NURSE PRACTITIONER

## 2023-02-03 PROCEDURE — 1159F PR MEDICATION LIST DOCUMENTED IN MEDICAL RECORD: ICD-10-PCS | Mod: HCNC,CPTII,95, | Performed by: NURSE PRACTITIONER

## 2023-02-03 PROCEDURE — 1160F PR REVIEW ALL MEDS BY PRESCRIBER/CLIN PHARMACIST DOCUMENTED: ICD-10-PCS | Mod: HCNC,CPTII,95, | Performed by: NURSE PRACTITIONER

## 2023-02-03 PROCEDURE — 99215 PR OFFICE/OUTPT VISIT, EST, LEVL V, 40-54 MIN: ICD-10-PCS | Mod: HCNC,95,, | Performed by: NURSE PRACTITIONER

## 2023-02-03 PROCEDURE — 1160F RVW MEDS BY RX/DR IN RCRD: CPT | Mod: HCNC,CPTII,95, | Performed by: NURSE PRACTITIONER

## 2023-02-03 PROCEDURE — 99215 OFFICE O/P EST HI 40 MIN: CPT | Mod: HCNC,95,, | Performed by: NURSE PRACTITIONER

## 2023-02-03 RX ORDER — SODIUM BICARBONATE 650 MG/1
650 TABLET ORAL 2 TIMES DAILY
Qty: 180 TABLET | Refills: 3 | Status: ON HOLD | OUTPATIENT
Start: 2023-02-03 | End: 2023-06-06 | Stop reason: HOSPADM

## 2023-02-03 NOTE — PROGRESS NOTES
Subjective:       Patient ID: Sherin Ortiz is a 78 y.o. white female who presents for follow-up evaluation of   No chief complaint on file.        HPI     Last seen by me in April 2021. Last seen by Dr. Choi in January 2018.      Patient presents for f/u of CKD.  Baseline creatinine very labile. Appears to be around 1.4 -1.6 mg/dL. No new labs, though she had an LOREN during an admission in May. Denies NSAIDs.    Hospitalized May 2021 c pyelo for IV antibiotics and hypertensive emergency.  Had heel fracture at some point since Jan. 2021.    Significant hx includes  uncontrolled DM x 50 yrs with diabetic neuropathy and proliferative diabetic retinopathy (laser surgery), morbid obesity, HTN, diastolic dysfunction (?). She also has a hx of kidney stones, she had an episode in January 2017 with right sided hydronephrosis and intervention (passed when stent was placed).     Significant family hx includes: no known renal disease, though son-in-law has ESRD    Last renal US: March 2019, reviewed. No hydronephrosis.    Update 11/15/21:  Last seen by me in July 2021.  Presents for f/u of CKD.  Baseline sCr 1.4-1.6 mg/dL.  Denies NSAID use, recent hospitalizations.   Home BPs: says its SBP in 120s per home health    Update 2/8/22:  Presents for f/u of CKD.  Baseline sCr 1.4-1.6 mg/dL  Admitted in January with pyelonephritis and prescribed ceftriaxone and discharged on cefpodoxime. Urine culture + for klebsiella, enterobacter and proteus. Had LOREN c sCr of 2.2 --> 1.9 (after 500 cc LR bolus) --> 1.7 --> 2.1. Said she did not have UTI symptoms prior to the pyelonephritis.    Home BPs: does not remember; says it is the same as last time she sent BP records from home health    Denies NSAIDs.    Update 5/17/22:  Presents for f/u of CKD.  Baseline was sCr 1.4-1.6 mg/dL. Appears to have stabilized at 1.8-1.9 mg/dL since LOREN in January 2022.  Currently on cefdinir for UTI.  Has had Covid since last visit.  Home BPs: 120s/  cannot remember DBP  Denies NSAIDs. Denies recent hospitalizations.    Update 9/9/22:  Presents for f/u of CKD.  Baseline was sCr 1.4-1.6 mg/dL. Appears to have stabilized at 1.8-1.9 mg/dL since LOREN in January 2022.  Admitted in hospital twice in July 2022 with GIB. Hgb down to 5.3. Discharged to SNF after second admission.  Recent LOREN vs expected rise in sCr post starting ACEi c sCr of 2.4 mg/dL. Unclear cause; it occurred after discharge from hospital, and Hgb had already returned to 9.4. Repeat labs were never drawn.  Home BPs: has not been taking per self. Says home health has been getting 120s. Says she has elevated pressures at the hospital/ doctor.    She also had an episode of asymptomatic bacteriuria. Discussed with urology team managing frequent UTIs. They recommended not treating if asymptomatic.    Nifedipine switched to lisionpril in July (7/28/22?). Patient plans to call and confirm.  Now on PPI.  Denies NSAID use.    Update 2/3/23:  The patient location is: [place]  The chief complaint leading to consultation is: f/u CKD    Visit type: audiovisual    Face to Face time with patient: 23 minutes  49 minutes of total time spent on the encounter, which includes face to face time and non-face to face time preparing to see the patient (eg, review of tests), Obtaining and/or reviewing separately obtained history, Documenting clinical information in the electronic or other health record, Independently interpreting results (not separately reported) and communicating results to the patient/family/caregiver, or Care coordination (not separately reported).         Each patient to whom he or she provides medical services by telemedicine is:  (1) informed of the relationship between the physician and patient and the respective role of any other health care provider with respect to management of the patient; and (2) notified that he or she may decline to receive medical services by telemedicine and may withdraw from such  "care at any time.    Notes:   Returns for f/u fo CKD. Granddaughter assists with appt.  Baseline sCr 1.8-2.0 mg/dL.  Home BPs: SBP 160s    Currently holding lisinopril due to hyperkalemia.  Was prescribed bumex for hyperkalemia for brief period.  Also off sodium bicarb 650 BID, which was prescribed for short period.    Denies NSAID use.  Needs new wheels for wheelchairs. Waiting to hear back from PCP regarding this.    Review of Systems   HENT:  Positive for trouble swallowing.    Respiratory:  Positive for shortness of breath (since recent surgery; worse with exertion; able to lay flat).    Cardiovascular:  Positive for leg swelling (same as before).   Gastrointestinal:  Negative for diarrhea, nausea and vomiting.   Genitourinary:  Negative for difficulty urinating, dysuria and hematuria.        "usually get infections all the time, but I never know I have them"   Neurological:  Positive for weakness (to legs).     Objective:       There were no vitals taken for this visit.  Physical Exam  Constitutional:       General: She is not in acute distress.     Appearance: She is obese. She is not ill-appearing.   Pulmonary:      Effort: No respiratory distress.   Psychiatric:         Mood and Affect: Mood normal.         Behavior: Behavior normal.         Thought Content: Thought content normal.         Judgment: Judgment normal.         Lab Results   Component Value Date    CREATININE 1.8 (H) 01/12/2023    URICACID 13.9 (H) 12/03/2020     Prot/Creat Ratio, Urine   Date Value Ref Range Status   12/22/2022 3.15 (H) 0.00 - 0.20 Final   08/24/2022 1.24 (H) 0.00 - 0.20 Final   05/06/2022 2.54 (H) 0.00 - 0.20 Final     Lab Results   Component Value Date     01/12/2023    K 5.1 01/12/2023    CO2 18 (L) 01/12/2023     (H) 01/12/2023     Lab Results   Component Value Date    .7 (H) 12/22/2022    CALCIUM 9.5 01/12/2023    CAION 1.22 12/15/2012    PHOS 3.3 12/22/2022     Lab Results   Component Value Date    HGB " 9.6 (L) 12/22/2022    WBC 3.40 (L) 12/22/2022    HCT 31.7 (L) 12/22/2022      Lab Results   Component Value Date    HGBA1C 8.4 (H) 06/30/2022     12/22/2022    BUN 34 (H) 01/12/2023     Lab Results   Component Value Date    LDLCALC 104.8 06/30/2022         Assessment:       1. Chronic kidney disease, stage 4 (severe)    2. Anemia, unspecified type            Plan:   CKD stage 3B/4 c eGFR 30 mL/min c superimposed LOREN vs expected rise in sCr post starting ACEi   - Clinically secondary to diabetic nephropathy (also has retinopathy and neuropathy). However, she also has had evidence of hydronephrosis in the past in an ultrasound with resolution in a follow up CT in 12/17.  Stable. Educated patient to control BP, BG, weight loss, remain well-hydrated, and avoid NSAIDs to prevent progression of CKD.    UPCR Proteinuric (worse before holding RAASi). H/o hyperkalemia.  ACEi on hold due to hyperkalemia.    No SGLT2i due to frequent UTIs.  No finerenone due to hyperkalemia.   Acid-base Acidosis. Start sodium bicarb 650 mg BID   Renal osteodystrophy Ca, phos okay. PTH elevated. On ergocalciferol weekly   Anemia Hgb low but stable. Need to check iron levels.   DM Poorly controlled. No recent A1c.   Lipid Management On statin.   ESRD planning Anticipatory guidance provided about timing of dialysis. Start discussions and planning when eGFR is about 20 mL/min; most patients start dialysis between 5-10 mL/min.    Daughter's fiance is progressing toward ESRD.       HTN - Resume lisinopril once she gets lokelma qod (sent to mail pharmacy)  - Reports white coat syndrome  - BMP 2-4 weeks after starting lisinopril.    Hyperkalemia - Borderline. Needs RAASi. Will start lokelma qod in order to make room for RAASi.    Frequent UTIs- Continue to follow with urology.    All questions patient had were answered.  Asked if further questions. None. F/u in clinic in 3 mos with labs and urine prior to next visit or sooner if needed.  ER for  emergency concerns.    Summary of Plan:  1.  Start sodium bicarb 650 mg BID  2.  Check TSAT, ferritin, TIBC  3. Check BMP about 2-4 weeks p resuming lisinopril  4. Start lokelma qod  5. RTC in 3 mos    49 minutes of total time spent on the encounter, which includes face to face time and non-face to face time preparing to see the patient (eg, review of tests), Obtaining and/or reviewing separately obtained history, documenting clinical information in the electronic or other health record, independently interpreting results (not separately reported) and communicating results to the patient/family/caregiver, or Care coordination (not separately reported).

## 2023-02-07 ENCOUNTER — PATIENT MESSAGE (OUTPATIENT)
Dept: INTERNAL MEDICINE | Facility: CLINIC | Age: 78
End: 2023-02-07
Payer: MEDICARE

## 2023-02-07 DIAGNOSIS — Z00.00 ENCOUNTER FOR MEDICARE ANNUAL WELLNESS EXAM: ICD-10-CM

## 2023-02-09 DIAGNOSIS — Z00.00 ENCOUNTER FOR MEDICARE ANNUAL WELLNESS EXAM: ICD-10-CM

## 2023-02-10 ENCOUNTER — PATIENT OUTREACH (OUTPATIENT)
Dept: ADMINISTRATIVE | Facility: HOSPITAL | Age: 78
End: 2023-02-10
Payer: MEDICARE

## 2023-02-10 NOTE — PROGRESS NOTES
Health Maintenance Due   Topic Date Due    COVID-19 Vaccine (1) Never done    Shingles Vaccine (1 of 2) Never done    DEXA Scan  Never done    Eye Exam  05/29/2020    Hemoglobin A1c  09/30/2022       HM updated. Triggered LINKS and Care Everywhere. Chart reviewed for [employer] gap report.    Nneka Pelletier LPN   Clinical Care Coordinator  [facility]

## 2023-02-13 ENCOUNTER — PATIENT MESSAGE (OUTPATIENT)
Dept: WOUND CARE | Facility: HOSPITAL | Age: 78
End: 2023-02-13
Payer: MEDICARE

## 2023-02-13 ENCOUNTER — PATIENT MESSAGE (OUTPATIENT)
Dept: NEPHROLOGY | Facility: CLINIC | Age: 78
End: 2023-02-13
Payer: MEDICARE

## 2023-02-14 ENCOUNTER — PATIENT MESSAGE (OUTPATIENT)
Dept: NEPHROLOGY | Facility: CLINIC | Age: 78
End: 2023-02-14
Payer: MEDICARE

## 2023-02-14 DIAGNOSIS — N18.4 CHRONIC KIDNEY DISEASE, STAGE 4 (SEVERE): Primary | ICD-10-CM

## 2023-03-09 ENCOUNTER — HOSPITAL ENCOUNTER (OUTPATIENT)
Dept: WOUND CARE | Facility: HOSPITAL | Age: 78
Discharge: HOME | End: 2023-03-09
Attending: SURGERY
Payer: MEDICARE

## 2023-03-09 VITALS
WEIGHT: 270 LBS | HEART RATE: 58 BPM | TEMPERATURE: 97 F | HEIGHT: 67 IN | BODY MASS INDEX: 42.38 KG/M2 | DIASTOLIC BLOOD PRESSURE: 94 MMHG | SYSTOLIC BLOOD PRESSURE: 198 MMHG

## 2023-03-09 DIAGNOSIS — I83.019 VENOUS ULCER OF BOTH LOWER EXTREMITIES WITH VARICOSE VEINS: ICD-10-CM

## 2023-03-09 DIAGNOSIS — L97.502 SKIN ULCER OF TOE WITH FAT LAYER EXPOSED, UNSPECIFIED LATERALITY: ICD-10-CM

## 2023-03-09 DIAGNOSIS — L97.919 VENOUS ULCER OF BOTH LOWER EXTREMITIES WITH VARICOSE VEINS: ICD-10-CM

## 2023-03-09 DIAGNOSIS — I89.0 LYMPHEDEMA: ICD-10-CM

## 2023-03-09 DIAGNOSIS — Z79.4 TYPE 2 DIABETES MELLITUS WITH DIABETIC POLYNEUROPATHY, WITH LONG-TERM CURRENT USE OF INSULIN: ICD-10-CM

## 2023-03-09 DIAGNOSIS — E11.42 TYPE 2 DIABETES MELLITUS WITH DIABETIC POLYNEUROPATHY, WITH LONG-TERM CURRENT USE OF INSULIN: ICD-10-CM

## 2023-03-09 DIAGNOSIS — L97.929 VENOUS ULCER OF BOTH LOWER EXTREMITIES WITH VARICOSE VEINS: ICD-10-CM

## 2023-03-09 DIAGNOSIS — I87.2 VENOUS INSUFFICIENCY OF BOTH LOWER EXTREMITIES: Primary | ICD-10-CM

## 2023-03-09 DIAGNOSIS — I83.029 VENOUS ULCER OF BOTH LOWER EXTREMITIES WITH VARICOSE VEINS: ICD-10-CM

## 2023-03-09 PROCEDURE — 29581 APPL MULTLAYER CMPRN SYS LEG: CPT | Mod: HCNC,50

## 2023-03-09 NOTE — PROGRESS NOTES
"                     [facility]    Subjective:       Patient ID: Sherin Ortiz is a 78 y.o. female.    Chief Complaint: Wound Care and Venous Ulcer    3/9/2023  Follow up with Dr Perry related to bilateral leg venous wounds.  New wounds to bilateral second toes, patient states the right second toe  blister started two weeks ago and the left second toe blister started one week ago, she is unsure how they happened.  Blood pressure 198/94, patient asymptomatic, MD aware, patient showed Dr Perry her blood pressure log states that "it always gets high when I come to the hospital", normally SBP runs between 120s-140s which is evident in her home log. Pt reports that her cardiologist is aware of this as well. Patient was able to get circaids and brought them to clinic today. We discussed that we will use them when wounds heal.  Plan of care updated.  Orders faxed to home health.    Review of Systems   All systems were reviewed and are negative, except that mentioned in the HPI.    Objective:     Vitals:    03/09/23 1526   BP: (!) 198/94   Pulse: (!) 58   Temp: 97.1 °F (36.2 °C)         Physical Exam  Constitutional:       Appearance: She is well-developed.   HENT:      Head: Normocephalic and atraumatic.   Eyes:      Pupils: Pupils are equal, round, and reactive to light.   Cardiovascular:      Rate and Rhythm: Normal rate.   Pulmonary:      Effort: Pulmonary effort is normal. No respiratory distress.      Breath sounds: No stridor.   Abdominal:      General: There is no distension.   Musculoskeletal:      Cervical back: Normal range of motion.   Skin:     Comments: See Synopsis for wound details   Neurological:      Mental Status: She is alert and oriented to person, place, and time.          Altered Skin Integrity 03/09/23 1400 Right anterior Toe, second Blister(s) (Active)   03/09/23 1400   Altered Skin Integrity Present on Admission - Did Patient arrive to the hospital with " altered skin?: yes   Side: Right   Orientation: anterior   Location: Toe, second   Wound Number:    Is this injury device related?:    Primary Wound Type: Blister(s)   Description of Altered Skin Integrity:    Ankle-Brachial Index:    Pulses:    Removal Indication and Assessment:    Wound Outcome:    (Retired) Wound Length (cm):    (Retired) Wound Width (cm):    (Retired) Depth (cm):    Wound Description (Comments):    Removal Indications:    Wound Image   03/09/23 1400   Dressing Appearance Intact;Moist drainage 03/09/23 1400   Drainage Amount Moderate 03/09/23 1400   Drainage Characteristics/Odor Serous 03/09/23 1400   Appearance Red;Pink;Moist 03/09/23 1400   Tissue loss description Full thickness 03/09/23 1400   Red (%), Wound Tissue Color 100 % 03/09/23 1400   Periwound Area Macerated 03/09/23 1400   Wound Edges Open 03/09/23 1400   Fischer Classification (diabetic foot ulcers only) Grade 1 03/09/23 1400   Wound Length (cm) 1.9 cm 03/09/23 1400   Wound Width (cm) 2 cm 03/09/23 1400   Wound Depth (cm) 0.1 cm 03/09/23 1400   Wound Volume (cm^3) 0.38 cm^3 03/09/23 1400   Wound Surface Area (cm^2) 3.8 cm^2 03/09/23 1400   Care Cleansed with:;Soap and water;Sterile normal saline 03/09/23 1400   Dressing Applied;Hydrofiber;Compression wrap 03/09/23 1400   Periwound Care Absorptive dressing applied 03/09/23 1400   Compression Two layer compression 03/09/23 1400   Dressing Change Due 03/16/23 03/09/23 1400            Altered Skin Integrity 03/09/23 1400 Left anterior Toe, second Blister(s) (Active)   03/09/23 1400   Altered Skin Integrity Present on Admission - Did Patient arrive to the hospital with altered skin?: yes   Side: Left   Orientation: anterior   Location: Toe, second   Wound Number:    Is this injury device related?: No   Primary Wound Type: Blister(s)   Description of Altered Skin Integrity:    Ankle-Brachial Index:    Pulses:    Removal Indication and Assessment:    Wound Outcome:    (Retired) Wound Length  (cm):    (Retired) Wound Width (cm):    (Retired) Depth (cm):    Wound Description (Comments):    Removal Indications:    Wound Image   03/09/23 1400   Dressing Appearance Intact;Moist drainage 03/09/23 1400   Drainage Amount Moderate 03/09/23 1400   Drainage Characteristics/Odor Serous 03/09/23 1400   Appearance Red;Pink;Moist 03/09/23 1400   Tissue loss description Partial thickness 03/09/23 1400   Red (%), Wound Tissue Color 100 % 03/09/23 1400   Periwound Area Intact;Dry 03/09/23 1400   Wound Edges Open 03/09/23 1400   Fischer Classification (diabetic foot ulcers only) Grade 1 03/09/23 1400   Wound Length (cm) 0.9 cm 03/09/23 1400   Wound Width (cm) 1.2 cm 03/09/23 1400   Wound Depth (cm) 0.1 cm 03/09/23 1400   Wound Volume (cm^3) 0.108 cm^3 03/09/23 1400   Wound Surface Area (cm^2) 1.08 cm^2 03/09/23 1400   Care Cleansed with:;Soap and water;Sterile normal saline 03/09/23 1400   Dressing Applied;Hydrofiber;Compression wrap 03/09/23 1400   Periwound Care Absorptive dressing applied 03/09/23 1400   Compression Two layer compression 03/09/23 1400   Dressing Change Due 03/16/23 03/09/23 1400            Wound 08/18/22 1400 Other (comment) Left lower Leg (Active)   08/18/22 1400    Pre-existing: Yes   Primary Wound Type: Other   Side: Left   Orientation: lower   Location: Leg   Wound Number:    Ankle-Brachial Index:    Pulses: doppler   Removal Indication and Assessment:    Wound Outcome:    (Retired) Wound Type:    (Retired) Wound Length (cm):    (Retired) Wound Width (cm):    (Retired) Depth (cm):    Wound Description (Comments):    Removal Indications:    Wound Image   03/09/23 1400   Dressing Appearance Intact;Moist drainage 03/09/23 1400   Drainage Amount Moderate 03/09/23 1400   Drainage Characteristics/Odor Serous 03/09/23 1400   Appearance Pink;Moist 03/09/23 1400   Tissue loss description Partial thickness 03/09/23 1400   Red (%), Wound Tissue Color 100 % 03/09/23 1400   Periwound Area Hemosiderin  Staining;Edematous;Satellite lesion 03/09/23 1400   Wound Edges Irregular 03/09/23 1400   Wound Length (cm) 1.4 cm 03/09/23 1400   Wound Width (cm) 1.1 cm 03/09/23 1400   Wound Depth (cm) 0.1 cm 03/09/23 1400   Wound Volume (cm^3) 0.154 cm^3 03/09/23 1400   Wound Surface Area (cm^2) 1.54 cm^2 03/09/23 1400   Care Cleansed with:;Soap and water;Sterile normal saline 03/09/23 1400   Dressing Applied;Hydrofiber;Compression wrap;Non-adherent 03/09/23 1400   Periwound Care Absorptive dressing applied 03/09/23 1400   Compression Two layer compression 03/09/23 1400   Dressing Change Due 03/16/23 03/09/23 1400            Wound 08/18/22 1400 Other (comment) Right lower Leg (Active)   08/18/22 1400    Pre-existing: Yes   Primary Wound Type: Other   Side: Right   Orientation: lower   Location: Leg   Wound Number:    Ankle-Brachial Index:    Pulses: doppler   Removal Indication and Assessment:    Wound Outcome:    (Retired) Wound Type:    (Retired) Wound Length (cm):    (Retired) Wound Width (cm):    (Retired) Depth (cm):    Wound Description (Comments):    Removal Indications:    Wound Image    03/09/23 1400   Dressing Appearance Intact;Moist drainage 03/09/23 1400   Drainage Amount Small 03/09/23 1400   Drainage Characteristics/Odor Serous 03/09/23 1400   Appearance Pink;Moist 03/09/23 1400   Tissue loss description Partial thickness 03/09/23 1400   Red (%), Wound Tissue Color 100 % 03/09/23 1400   Periwound Area Satellite lesion;Edematous 03/09/23 1400   Wound Length (cm) 4 cm 03/09/23 1400   Wound Width (cm) 3.1 cm 03/09/23 1400   Wound Depth (cm) 0.1 cm 03/09/23 1400   Wound Volume (cm^3) 1.24 cm^3 03/09/23 1400   Wound Surface Area (cm^2) 12.4 cm^2 03/09/23 1400   Care Cleansed with:;Soap and water;Sterile normal saline 03/09/23 1400   Dressing Applied;Hydrofiber;Compression wrap;Non-adherent 03/09/23 1400   Periwound Care Absorptive dressing applied 03/09/23 1400   Compression Two layer compression 03/09/23 1400    Dressing Change Due 03/16/23 03/09/23 1400         Assessment/Plan:         ICD-10-CM ICD-9-CM   1. Venous insufficiency of both lower extremities  I87.2 459.81   2. Lymphedema  I89.0 457.1   3. Type 2 diabetes mellitus with diabetic polyneuropathy, with long-term current use of insulin  E11.42 250.60    Z79.4 357.2     V58.67   4. Venous ulcer of both lower extremities with varicose veins  I83.019 454.0    I83.029     L97.919     L97.929    5. Skin ulcer of toe with fat layer exposed, unspecified laterality  L97.502 707.15           Tissue pathology and/or culture taken:  [] Yes [x] No   Sharp debridement performed:   [] Yes [x] No   Labs ordered this visit:   [] Yes [x] No   Imaging ordered this visit:   [] Yes [x] No           Orders Placed This Encounter   Procedures    Change dressing     Right and left lower legs, right second toe left second toe wounds:     Cleanse wound with: Normal Saline   Lidocaine:PRN   Primary dressing: Hydrofera ready to any open wounds and bilateral second toes, xeroform to all satellite lesions and small openings    Edema control: Co-flex with calamine to BLE toes to knee, protect bilateral toes with small abd pad     Follow-up:  Dr. Perry 3 weeks   Home Health: [facility]: Please use supplies as per order above. Change dressings weekly and PRN except when the patient is in clinic.  Thank you.      All questions were answered.  Pt to bring Circaids to clinic when wounds have healed.  Follow up in 2 weeks (on 3/23/2023) for Dr Perry.

## 2023-03-23 ENCOUNTER — LAB VISIT (OUTPATIENT)
Dept: LAB | Facility: HOSPITAL | Age: 78
End: 2023-03-23
Attending: STUDENT IN AN ORGANIZED HEALTH CARE EDUCATION/TRAINING PROGRAM
Payer: MEDICARE

## 2023-03-23 ENCOUNTER — OFFICE VISIT (OUTPATIENT)
Dept: INTERNAL MEDICINE | Facility: CLINIC | Age: 78
End: 2023-03-23
Payer: MEDICARE

## 2023-03-23 VITALS
RESPIRATION RATE: 16 BRPM | OXYGEN SATURATION: 98 % | WEIGHT: 270.06 LBS | SYSTOLIC BLOOD PRESSURE: 136 MMHG | BODY MASS INDEX: 42.39 KG/M2 | DIASTOLIC BLOOD PRESSURE: 76 MMHG | HEART RATE: 52 BPM | HEIGHT: 67 IN

## 2023-03-23 DIAGNOSIS — Z79.4 TYPE 2 DIABETES MELLITUS WITH DIABETIC POLYNEUROPATHY, WITH LONG-TERM CURRENT USE OF INSULIN: Primary | ICD-10-CM

## 2023-03-23 DIAGNOSIS — E78.5 HYPERLIPIDEMIA LDL GOAL <100: ICD-10-CM

## 2023-03-23 DIAGNOSIS — Z79.4 TYPE 2 DIABETES MELLITUS WITH DIABETIC POLYNEUROPATHY, WITH LONG-TERM CURRENT USE OF INSULIN: ICD-10-CM

## 2023-03-23 DIAGNOSIS — I10 ESSENTIAL HYPERTENSION: ICD-10-CM

## 2023-03-23 DIAGNOSIS — L97.919 VENOUS ULCER OF BOTH LOWER EXTREMITIES WITH VARICOSE VEINS: ICD-10-CM

## 2023-03-23 DIAGNOSIS — N18.4 STAGE 4 CHRONIC KIDNEY DISEASE: ICD-10-CM

## 2023-03-23 DIAGNOSIS — E11.42 TYPE 2 DIABETES MELLITUS WITH DIABETIC POLYNEUROPATHY, WITH LONG-TERM CURRENT USE OF INSULIN: ICD-10-CM

## 2023-03-23 DIAGNOSIS — I83.019 VENOUS ULCER OF BOTH LOWER EXTREMITIES WITH VARICOSE VEINS: ICD-10-CM

## 2023-03-23 DIAGNOSIS — I83.029 VENOUS ULCER OF BOTH LOWER EXTREMITIES WITH VARICOSE VEINS: ICD-10-CM

## 2023-03-23 DIAGNOSIS — D63.8 ANEMIA OF CHRONIC DISEASE: Chronic | ICD-10-CM

## 2023-03-23 DIAGNOSIS — L97.929 VENOUS ULCER OF BOTH LOWER EXTREMITIES WITH VARICOSE VEINS: ICD-10-CM

## 2023-03-23 DIAGNOSIS — I70.0 AORTIC ATHEROSCLEROSIS: ICD-10-CM

## 2023-03-23 DIAGNOSIS — E11.42 TYPE 2 DIABETES MELLITUS WITH DIABETIC POLYNEUROPATHY, WITH LONG-TERM CURRENT USE OF INSULIN: Primary | ICD-10-CM

## 2023-03-23 DIAGNOSIS — I77.9 PAOD (PERIPHERAL ARTERIAL OCCLUSIVE DISEASE): ICD-10-CM

## 2023-03-23 LAB
CHOLEST SERPL-MCNC: 114 MG/DL (ref 120–199)
CHOLEST/HDLC SERPL: 2.4 {RATIO} (ref 2–5)
ESTIMATED AVG GLUCOSE: 186 MG/DL (ref 68–131)
HBA1C MFR BLD: 8.1 % (ref 4–5.6)
HDLC SERPL-MCNC: 48 MG/DL (ref 40–75)
HDLC SERPL: 42.1 % (ref 20–50)
LDLC SERPL CALC-MCNC: 49 MG/DL (ref 63–159)
NONHDLC SERPL-MCNC: 66 MG/DL
TRIGL SERPL-MCNC: 85 MG/DL (ref 30–150)

## 2023-03-23 PROCEDURE — 1125F PR PAIN SEVERITY QUANTIFIED, PAIN PRESENT: ICD-10-PCS | Mod: HCNC,CPTII,S$GLB, | Performed by: STUDENT IN AN ORGANIZED HEALTH CARE EDUCATION/TRAINING PROGRAM

## 2023-03-23 PROCEDURE — 99214 PR OFFICE/OUTPT VISIT, EST, LEVL IV, 30-39 MIN: ICD-10-PCS | Mod: HCNC,S$GLB,, | Performed by: STUDENT IN AN ORGANIZED HEALTH CARE EDUCATION/TRAINING PROGRAM

## 2023-03-23 PROCEDURE — 1159F MED LIST DOCD IN RCRD: CPT | Mod: HCNC,CPTII,S$GLB, | Performed by: STUDENT IN AN ORGANIZED HEALTH CARE EDUCATION/TRAINING PROGRAM

## 2023-03-23 PROCEDURE — 3075F SYST BP GE 130 - 139MM HG: CPT | Mod: HCNC,CPTII,S$GLB, | Performed by: STUDENT IN AN ORGANIZED HEALTH CARE EDUCATION/TRAINING PROGRAM

## 2023-03-23 PROCEDURE — 99999 PR PBB SHADOW E&M-EST. PATIENT-LVL III: ICD-10-PCS | Mod: PBBFAC,HCNC,, | Performed by: STUDENT IN AN ORGANIZED HEALTH CARE EDUCATION/TRAINING PROGRAM

## 2023-03-23 PROCEDURE — 3078F PR MOST RECENT DIASTOLIC BLOOD PRESSURE < 80 MM HG: ICD-10-PCS | Mod: HCNC,CPTII,S$GLB, | Performed by: STUDENT IN AN ORGANIZED HEALTH CARE EDUCATION/TRAINING PROGRAM

## 2023-03-23 PROCEDURE — 1125F AMNT PAIN NOTED PAIN PRSNT: CPT | Mod: HCNC,CPTII,S$GLB, | Performed by: STUDENT IN AN ORGANIZED HEALTH CARE EDUCATION/TRAINING PROGRAM

## 2023-03-23 PROCEDURE — 1159F PR MEDICATION LIST DOCUMENTED IN MEDICAL RECORD: ICD-10-PCS | Mod: HCNC,CPTII,S$GLB, | Performed by: STUDENT IN AN ORGANIZED HEALTH CARE EDUCATION/TRAINING PROGRAM

## 2023-03-23 PROCEDURE — 80061 LIPID PANEL: CPT | Mod: HCNC | Performed by: STUDENT IN AN ORGANIZED HEALTH CARE EDUCATION/TRAINING PROGRAM

## 2023-03-23 PROCEDURE — 3075F PR MOST RECENT SYSTOLIC BLOOD PRESS GE 130-139MM HG: ICD-10-PCS | Mod: HCNC,CPTII,S$GLB, | Performed by: STUDENT IN AN ORGANIZED HEALTH CARE EDUCATION/TRAINING PROGRAM

## 2023-03-23 PROCEDURE — 99214 OFFICE O/P EST MOD 30 MIN: CPT | Mod: HCNC,S$GLB,, | Performed by: STUDENT IN AN ORGANIZED HEALTH CARE EDUCATION/TRAINING PROGRAM

## 2023-03-23 PROCEDURE — 99999 PR PBB SHADOW E&M-EST. PATIENT-LVL III: CPT | Mod: PBBFAC,HCNC,, | Performed by: STUDENT IN AN ORGANIZED HEALTH CARE EDUCATION/TRAINING PROGRAM

## 2023-03-23 PROCEDURE — 3078F DIAST BP <80 MM HG: CPT | Mod: HCNC,CPTII,S$GLB, | Performed by: STUDENT IN AN ORGANIZED HEALTH CARE EDUCATION/TRAINING PROGRAM

## 2023-03-23 PROCEDURE — 83036 HEMOGLOBIN GLYCOSYLATED A1C: CPT | Mod: HCNC | Performed by: STUDENT IN AN ORGANIZED HEALTH CARE EDUCATION/TRAINING PROGRAM

## 2023-03-23 PROCEDURE — 84443 ASSAY THYROID STIM HORMONE: CPT | Mod: HCNC | Performed by: STUDENT IN AN ORGANIZED HEALTH CARE EDUCATION/TRAINING PROGRAM

## 2023-03-23 PROCEDURE — 1160F RVW MEDS BY RX/DR IN RCRD: CPT | Mod: HCNC,CPTII,S$GLB, | Performed by: STUDENT IN AN ORGANIZED HEALTH CARE EDUCATION/TRAINING PROGRAM

## 2023-03-23 PROCEDURE — 1101F PR PT FALLS ASSESS DOC 0-1 FALLS W/OUT INJ PAST YR: ICD-10-PCS | Mod: HCNC,CPTII,S$GLB, | Performed by: STUDENT IN AN ORGANIZED HEALTH CARE EDUCATION/TRAINING PROGRAM

## 2023-03-23 PROCEDURE — 3288F PR FALLS RISK ASSESSMENT DOCUMENTED: ICD-10-PCS | Mod: HCNC,CPTII,S$GLB, | Performed by: STUDENT IN AN ORGANIZED HEALTH CARE EDUCATION/TRAINING PROGRAM

## 2023-03-23 PROCEDURE — 3288F FALL RISK ASSESSMENT DOCD: CPT | Mod: HCNC,CPTII,S$GLB, | Performed by: STUDENT IN AN ORGANIZED HEALTH CARE EDUCATION/TRAINING PROGRAM

## 2023-03-23 PROCEDURE — 1160F PR REVIEW ALL MEDS BY PRESCRIBER/CLIN PHARMACIST DOCUMENTED: ICD-10-PCS | Mod: HCNC,CPTII,S$GLB, | Performed by: STUDENT IN AN ORGANIZED HEALTH CARE EDUCATION/TRAINING PROGRAM

## 2023-03-23 PROCEDURE — 36415 COLL VENOUS BLD VENIPUNCTURE: CPT | Mod: HCNC | Performed by: STUDENT IN AN ORGANIZED HEALTH CARE EDUCATION/TRAINING PROGRAM

## 2023-03-23 PROCEDURE — 1101F PT FALLS ASSESS-DOCD LE1/YR: CPT | Mod: HCNC,CPTII,S$GLB, | Performed by: STUDENT IN AN ORGANIZED HEALTH CARE EDUCATION/TRAINING PROGRAM

## 2023-03-23 RX ORDER — INSULIN GLARGINE 100 [IU]/ML
20 INJECTION, SOLUTION SUBCUTANEOUS NIGHTLY
Qty: 18 ML | Refills: 3 | Status: ON HOLD | OUTPATIENT
Start: 2023-03-23 | End: 2023-05-02 | Stop reason: SDUPTHER

## 2023-03-23 NOTE — ASSESSMENT & PLAN NOTE
Pt takes lantus as needed, she takes it at night 15-20 units if BS >200. Checking A1c today. There is no A1c since [place]

## 2023-03-23 NOTE — ASSESSMENT & PLAN NOTE
Being followed by nephrology. Pt is not on ACEI since it leads to hyperkalemia. She will restart lisinopril once her insurance approves lokelma

## 2023-03-23 NOTE — PROGRESS NOTES
INTERNAL MEDICINE INITIAL VISIT NOTE      CHIEF COMPLAINT     Chief Complaint   Patient presents with    Establish Care       HPI     Sherin Ortiz is a 78 y.o.  female who presents with a PMHx of TIA, venous ulcers, PAD, HTN, HLD, CKD IV, ACD, IDDM (6/2022 HgbA1C 8.4), obesity, GIB s/t dieulafoy lesion requiring blood transfusion, hyperparathyroidism, osteopenia, wheelchair dependence is here to establish care with me.  -still has venous ulcers on her legs. Follows with wound care dr. Kevin.   -denies bloody or black stools. Had GI recently end of 2022. Follows with GI  -pt is not taking lisinopril, her nephrologist put it on hold due to hyperkalemia. They want her to restart it once she is on lokelma.   -no A1c since 6/2022. 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    Benign essential hypertension 11/14/2012    Cataract     s/p phacoemulsification    Chronic kidney disease (CKD), stage III (moderate) 9/12/2013    Diabetic peripheral neuropathy associated with type 2 diabetes mellitus 11/14/2014    causing right hemiparesis    Gait disorder     Hyperlipidemia     Iritis - Both Eyes 6/10/2013    Kidney stone     Lymphedema     Morbid obesity with BMI of 40.0-44.9, adult 2/18/2015    Nephrolithiasis 4/20/2016    NS (nuclear sclerosis) 4/1/2013    Nuclear sclerosis - Both Eyes 4/29/2013    Preseptal cellulitis - Right Eye 4/29/2013    Proliferative diabetic retinopathy - Both Eyes 4/29/2013    Proliferative diabetic retinopathy, both eyes 4/1/2013    PSC (posterior subcapsular cataract) - Both Eyes 4/29/2013    S/P hernia repair 12/19/2012    TIA (transient ischemic attack) 11/18/2014    Tinea pedis 7/24/2012    Tinea pedis is present on both feet.     Type 2 diabetes mellitus with diabetic polyneuropathy, with long-term current use of insulin 9/18/2015    Type 2 diabetes mellitus with  renal manifestations, controlled 12/12/2013    Type 2 diabetes, controlled, with moderate nonproliferative diabetic retinopathy without macular edema 9/17/2015    Ulcer of left lower extremity, limited to breakdown of skin 7/8/2015    Unspecified cerebral artery occlusion with cerebral infarction 11/16/2014    Unspecified venous (peripheral) insufficiency     Ureteral stone with hydronephrosis 1/27/2016    UTI (lower urinary tract infection)     Vaginal infection     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       Past Surgical History:  Past Surgical History:   Procedure Laterality Date    ABLATION Bilateral 6/23/2022    Procedure: Ablation;  Surgeon: Vahid Farfan MD;  Location: [facility] CATH LAB/EP;  Service: Cardiology;  Laterality: Bilateral;    ABLATION Right 11/17/2022    Procedure: Ablation;  Surgeon: Vahid Farfan MD;  Location: [facility] CATH LAB/EP;  Service: Cardiology;  Laterality: Right;    APPENDECTOMY      CATARACT EXTRACTION W/  INTRAOCULAR LENS IMPLANT Left 5/21/2013    CATARACT EXTRACTION W/  INTRAOCULAR LENS IMPLANT Right 6/4/2013    CHOLECYSTECTOMY      COLONOSCOPY  12/22/2005    normal    COLONOSCOPY N/A 7/14/2022    Procedure: COLONOSCOPY;  Surgeon: Kannan Mace MD;  Location: [facility];  Service: Endoscopy;  Laterality: N/A;    ESOPHAGOGASTRODUODENOSCOPY  12/21/2015    hiatal hernia, Schatzki ring    ESOPHAGOGASTRODUODENOSCOPY N/A 7/13/2022    Procedure: EGD (ESOPHAGOGASTRODUODENOSCOPY);  Surgeon: Kannan Mace MD;  Location: [facility];  Service: Endoscopy;  Laterality: N/A;    EYE SURGERY Bilateral 2008    laser surgery both eyes    INTRALUMINAL GASTROINTESTINAL TRACT IMAGING VIA CAPSULE N/A 7/15/2022    Procedure: IMAGING PROCEDURE, GI TRACT, INTRALUMINAL, VIA CAPSULE;  Surgeon: Kannan Mace MD;  Location: [facility];  Service: Endoscopy;  Laterality: N/A;    NASAL SEPTUM SURGERY      SMALL BOWEL ENTEROSCOPY N/A 7/18/2022    Procedure: ENTEROSCOPY Upper SBE;  Surgeon: Salo GRAY  "MD Zac;  Location: [facility];  Service: Endoscopy;  Laterality: N/A;    SUBTOTAL COLECTOMY  12/13/2012    transverse colon, for incarcerated umbilical hernia, Dr. Kat Bower       Allergies:  Review of patient's allergies indicates:   Allergen Reactions    Penicillins Hives     Other reaction(s): Hives  Patient has received cefdinir, ceftriaxone, cefazolin and cefepime in the past with no documented reactions    Sulfa (sulfonamide antibiotics) Other (See Comments)     Eyad, pt states her doctor told her the shakes were possibly caused by an allergy to sulfa       Home Medications:  Prior to Admission medications    Medication Sig Start Date End Date Taking? Authorizing Provider   aspirin 81 MG Chew Chew 1 tablet (81 mg total) by mouth once daily. 7/20/22  Yes Su Maddox MD   atorvastatin (LIPITOR) 40 MG tablet Take 1 tablet (40 mg total) by mouth every evening. 12/14/22  Yes Deedee Calderon MD   blood sugar diagnostic (TRUE METRIX GLUCOSE TEST STRIP) Strp TEST BLOOD SUGAR TWICE DAILY 12/14/22  Yes Deedee Calderon MD   insulin glargine,hum.rec.anlog (LANTUS SUBQ) Inject into the skin.   Yes Historical Provider   lancets (TRUEPLUS LANCETS) 33 gauge Misc TEST TWO TIMES DAILY 12/14/22  Yes Deedee Calderon MD   pantoprazole (PROTONIX) 40 MG tablet Take 1 tablet (40 mg total) by mouth once daily. 12/14/22  Yes Deedee Calderon MD   sodium bicarbonate 650 MG tablet Take 1 tablet (650 mg total) by mouth 2 (two) times daily. 2/3/23 2/3/24 Yes Jody Murray NP   ascorbic acid, vitamin C, (VITAMIN C) 500 MG tablet Take 500 mg by mouth once daily.    Historical Provider   insulin detemir U-100 (LEVEMIR) 100 unit/mL injection Inject 10-15 Units into the skin every evening.  Patient not taking: Reported on 2/3/2023 12/15/22 12/15/23  Deedee Calderon MD   insulin syr/ndl U100 half yesenia (DROPLET INSULIN SYR,HALF UNIT,) 0.5 mL 30 gauge x 1/2" Syrg Use to administer insulin " nightly  Patient not taking: Reported on 3/23/2023 11/4/22   Deedee Calderon MD   lisinopriL (PRINIVIL,ZESTRIL) 5 MG tablet Take 1 tablet (5 mg total) by mouth once daily.  Patient not taking: Reported on 2/3/2023 12/14/22   Deedee Calderon MD   sodium zirconium cyclosilicate (LOKELMA) 5 gram packet Take 1 packet (5 g total) by mouth every other day. Mix entire contents of packet(s) into drinking glass containing 3 tablespoons of water; stir well and drink immediately. Add water and repeat until no powder remains to receive entire dose.  Patient not taking: Reported on 3/23/2023 2/3/23 1/29/24  Jody Murray NP   blood glucose control, high (TRUE METRIX LEVEL 3) Soln Used to calibrate weekly 21  Ame Vasquez MD       Family History:  Family History   Problem Relation Age of Onset    Diabetes Sister     Cataracts Sister     Heart disease Brother     Cataracts Brother     Leukemia Mother     Cancer Neg Hx     Amblyopia Neg Hx     Blindness Neg Hx     Glaucoma Neg Hx     Hypertension Neg Hx     Macular degeneration Neg Hx     Retinal detachment Neg Hx     Strabismus Neg Hx     Stroke Neg Hx     Thyroid disease Neg Hx     Kidney disease Neg Hx        Social History:  Social History     Tobacco Use    Smoking status: Former     Packs/day: 0.50     Years: 15.00     Pack years: 7.50     Types: Cigarettes     Quit date: 1982     Years since quittin.7    Smokeless tobacco: Former    Tobacco comments:     smoked one pack per week   Substance Use Topics    Alcohol use: No    Drug use: No       Review of Systems:  Review of Systems   Constitutional:  Negative for chills, fever and unexpected weight change.   HENT:  Negative for congestion, rhinorrhea and sore throat.    Respiratory:  Negative for cough, chest tightness and shortness of breath.    Cardiovascular:  Negative for chest pain.   Gastrointestinal:  Negative for blood in stool, constipation and diarrhea.   Genitourinary:  Negative  "for dysuria and hematuria.   Neurological:  Negative for dizziness, light-headedness and headaches.     Health Maintainence:   Tdap 12/2016  Flu 12/2022  Prevnar 12/2017  Pneumovax 12/2008  Zoster did not get  MMG does not need it  C-SCOPE 7/2022 due to GI bleed  DEXA - ordered by last pcp  Low Dose CT scan in pts if 55-81 y/o with 30 pack yr smoking hx and currently smoke or have quit within past 15 yrs. Does not meet criteria     PHYSICAL EXAM     /76   Pulse (!) 52   Resp 16   Ht 5' 7" (1.702 m)   Wt 122.5 kg (270 lb 1 oz)   LMP  (LMP Unknown) Comment: patient refused to weigh/wounds to legs and toes   SpO2 98%   BMI 42.30 kg/m²     GEN - A+OX4, NAD   HEENT - PERRL  CV - RRR  Chest - CTAB, no wheezing or rhonchi  Abd - S/NT/obese abdomen/+BS.   Ext - 2+ radial pulses. BL LE edema present, wrapped lower extremities  MSK - pt is wheelchair bound, sitting in her power wheelchair right now.   Skin - No rash.    LABS     Previous labs reviewed from 12/2022 and 1/2022. Renal function at baseline. Has anemia of ckd.     ASSESSMENT/PLAN   1. Type 2 diabetes mellitus with diabetic polyneuropathy, with long-term current use of insulin  Assessment & Plan:  Pt takes lantus as needed, she takes it at night 15-20 units if BS >200. Checking A1c today. There is no A1c since june    Orders:  -     insulin glargine (LANTUS U-100 INSULIN) 100 unit/mL injection; Inject 20 Units into the skin every evening.  Dispense: 18 mL; Refill: 3  -     Hemoglobin A1C; Future; Expected date: 03/23/2023    2. Essential hypertension  Assessment & Plan:  Bp well controlled. Not on anything for it right now. Will monitor    Orders:  -     TSH; Future; Expected date: 03/23/2023    3. Hyperlipidemia LDL goal <100  Assessment & Plan:  Checking lipid panel today. Continue statin    Orders:  -     Lipid Panel; Future; Expected date: 03/23/2023    4. Aortic atherosclerosis  Overview:  CXR 1/2016    Assessment & Plan:  Continue asa and " "statin      5. Stage 4 chronic kidney disease  Assessment & Plan:  Being followed by nephrology. Pt is not on ACEI since it leads to hyperkalemia. She will restart lisinopril once her insurance approves lokelma      6. Anemia of chronic disease  Assessment & Plan:  Anemia with stable hb in 9's. Will monitor. Denies any bleeding      7. Venous ulcer of both lower extremities with varicose veins  Assessment & Plan:  Pt is going to wound clinic. Legs are wrapped. Continue to follow      8. PAOD (peripheral arterial occlusive disease)  Overview:  Location in Record and Date:   [facility] DIS[facility]-9/18/2015    "Rt DISHA (1.12) Segment/Brachial Index and PVR wavef  orms indicate minimal peripheral arterial obstructive disease.  Lt DISHA (1.09) Segment/Brachial Index and PVR waveforms indicate minimal peripheral arterial obstructive disease."  Other Chronic Conditions:  HLD, DM    Medications:  Aspirin 81 mg, Lipi  tor 40 mg    Assessment & Plan:  Pt is on asa, statin, continue.         RTC in 3 months, sooner if needed and depending on labs.    Darby Baum MD  Department of Internal Medicine - [facility]  1:19 PM   "

## 2023-03-24 ENCOUNTER — TELEPHONE (OUTPATIENT)
Dept: INTERNAL MEDICINE | Facility: CLINIC | Age: 78
End: 2023-03-24
Payer: MEDICARE

## 2023-03-24 ENCOUNTER — TELEPHONE (OUTPATIENT)
Dept: NEPHROLOGY | Facility: CLINIC | Age: 78
End: 2023-03-24
Payer: MEDICARE

## 2023-03-24 LAB — TSH SERPL DL<=0.005 MIU/L-ACNC: 2.57 UIU/ML (ref 0.4–4)

## 2023-03-24 NOTE — TELEPHONE ENCOUNTER
Called and spoke to Lauren López. Patient insulin order says glargine. Maribel wants to change it too the brand name of Lantus so they can cover the medication under her insurance. Need ok to change order.

## 2023-03-24 NOTE — TELEPHONE ENCOUNTER
----- Message from Darby Baum MD sent at 3/24/2023  2:38 PM CDT -----  Please let patient know that labs are acceptable. Her A1c is 8 which I am fine

## 2023-03-24 NOTE — TELEPHONE ENCOUNTER
----- Message from Jody Murray NP sent at 3/24/2023 11:09 AM CDT -----  Pls make sure pt gets f/u in May. THanks

## 2023-03-24 NOTE — TELEPHONE ENCOUNTER
Called PT. Informed PT that her labs are acceptable. Informed PT that Her A1c is 8 which Is fine.

## 2023-03-24 NOTE — TELEPHONE ENCOUNTER
Called and spoke to Katy at [facility] about the insulin Lantus change per her PCP. (Ref #[id number])

## 2023-03-30 ENCOUNTER — HOSPITAL ENCOUNTER (OUTPATIENT)
Dept: WOUND CARE | Facility: HOSPITAL | Age: 78
Discharge: HOME | End: 2023-03-30
Attending: SURGERY
Payer: MEDICARE

## 2023-03-30 VITALS
WEIGHT: 270 LBS | DIASTOLIC BLOOD PRESSURE: 53 MMHG | HEIGHT: 67 IN | SYSTOLIC BLOOD PRESSURE: 160 MMHG | TEMPERATURE: 97 F | HEART RATE: 59 BPM | BODY MASS INDEX: 42.38 KG/M2

## 2023-03-30 DIAGNOSIS — L97.929 VENOUS ULCER OF BOTH LOWER EXTREMITIES WITH VARICOSE VEINS: ICD-10-CM

## 2023-03-30 DIAGNOSIS — Z79.4 TYPE 2 DIABETES MELLITUS WITH DIABETIC POLYNEUROPATHY, WITH LONG-TERM CURRENT USE OF INSULIN: ICD-10-CM

## 2023-03-30 DIAGNOSIS — I89.0 LYMPHEDEMA: ICD-10-CM

## 2023-03-30 DIAGNOSIS — E11.42 TYPE 2 DIABETES MELLITUS WITH DIABETIC POLYNEUROPATHY, WITH LONG-TERM CURRENT USE OF INSULIN: ICD-10-CM

## 2023-03-30 DIAGNOSIS — I83.019 VENOUS ULCER OF BOTH LOWER EXTREMITIES WITH VARICOSE VEINS: ICD-10-CM

## 2023-03-30 DIAGNOSIS — I83.029 VENOUS ULCER OF BOTH LOWER EXTREMITIES WITH VARICOSE VEINS: ICD-10-CM

## 2023-03-30 DIAGNOSIS — L97.919 VENOUS ULCER OF BOTH LOWER EXTREMITIES WITH VARICOSE VEINS: ICD-10-CM

## 2023-03-30 DIAGNOSIS — I87.2 VENOUS INSUFFICIENCY OF BOTH LOWER EXTREMITIES: Primary | ICD-10-CM

## 2023-03-30 PROCEDURE — 29581 APPL MULTLAYER CMPRN SYS LEG: CPT | Mod: HCNC,50

## 2023-03-30 NOTE — PROGRESS NOTES
[facility]    Subjective:       Patient ID: Sherin Ortiz is a 78 y.o. female.    Chief Complaint: Wound Care    Patient to wound care center for right and left lower legs, progressing well, continue  with same plan of care. Patient will follow up with Dr. KULDIP resendiz. Pt has received her CirCaide compression and will bring to future visit when closer to being healed.    Review of Systems   All systems were reviewed and are negative, except that mentioned in the HPI.    Objective:     Vitals:    03/30/23 1410   BP: (!) 160/53   Pulse: (!) 59   Temp: 97 °F (36.1 °C)         Physical Exam  Constitutional:       Appearance: She is well-developed.   HENT:      Head: Normocephalic and atraumatic.   Eyes:      Pupils: Pupils are equal, round, and reactive to light.   Cardiovascular:      Rate and Rhythm: Normal rate.   Pulmonary:      Effort: Pulmonary effort is normal. No respiratory distress.      Breath sounds: No stridor.   Abdominal:      General: There is no distension.   Musculoskeletal:      Cervical back: Normal range of motion.   Skin:     Comments: See Synopsis for wound details   Neurological:      Mental Status: She is alert and oriented to person, place, and time.          Wound 08/18/22 1400 Other (comment) Left lower Leg (Active)   08/18/22 1400    Pre-existing: Yes   Primary Wound Type: Other   Side: Left   Orientation: lower   Location: Leg   Wound Number:    Ankle-Brachial Index:    Pulses: doppler   Removal Indication and Assessment:    Wound Outcome:    (Retired) Wound Type:    (Retired) Wound Length (cm):    (Retired) Wound Width (cm):    (Retired) Depth (cm):    Wound Description (Comments):    Removal Indications:    Wound Image   03/30/23 1321   Dressing Appearance Intact;Moist drainage 03/30/23 1321   Drainage Amount Moderate 03/30/23 1321   Drainage Characteristics/Odor Serous 03/30/23 1321   Appearance Pink;Moist 03/30/23 1321   Tissue loss  description Partial thickness 03/30/23 1321   Red (%), Wound Tissue Color 100 % 03/30/23 1321   Periwound Area Intact;Edematous 03/30/23 1321   Wound Edges Irregular 03/30/23 1321   Wound Length (cm) 1 cm 03/30/23 1321   Wound Width (cm) 1.1 cm 03/30/23 1321   Wound Depth (cm) 0.1 cm 03/30/23 1321   Wound Volume (cm^3) 0.11 cm^3 03/30/23 1321   Wound Surface Area (cm^2) 1.1 cm^2 03/30/23 1321   Care Cleansed with:;Soap and water 03/30/23 1321   Dressing Applied;Hydrofiber;Compression wrap;Non-adherent 03/30/23 1321   Periwound Care Absorptive dressing applied 03/30/23 1321   Compression Two layer compression 03/30/23 1321   Dressing Change Due 04/13/23 03/30/23 1321            Wound 08/18/22 1400 Other (comment) Right lower Leg (Active)   08/18/22 1400    Pre-existing: Yes   Primary Wound Type: Other   Side: Right   Orientation: lower   Location: Leg   Wound Number:    Ankle-Brachial Index:    Pulses: doppler   Removal Indication and Assessment:    Wound Outcome:    (Retired) Wound Type:    (Retired) Wound Length (cm):    (Retired) Wound Width (cm):    (Retired) Depth (cm):    Wound Description (Comments):    Removal Indications:    Wound Image   03/30/23 1321   Dressing Appearance Intact;Moist drainage 03/30/23 1321   Drainage Amount Small 03/30/23 1321   Drainage Characteristics/Odor Serous 03/30/23 1321   Appearance Pink;Moist 03/30/23 1321   Tissue loss description Partial thickness 03/30/23 1321   Red (%), Wound Tissue Color 100 % 03/30/23 1321   Periwound Area Satellite lesion 03/30/23 1321   Wound Length (cm) 2 cm 03/30/23 1321   Wound Width (cm) 3 cm 03/30/23 1321   Wound Depth (cm) 0.1 cm 03/30/23 1321   Wound Volume (cm^3) 0.6 cm^3 03/30/23 1321   Wound Surface Area (cm^2) 6 cm^2 03/30/23 1321   Care Cleansed with:;Soap and water 03/30/23 1321   Dressing Applied;Hydrofiber;Compression wrap;Non-adherent 03/30/23 1321   Periwound Care Absorptive dressing applied 03/30/23 1321   Compression Two layer  compression 03/30/23 1321   Dressing Change Due 04/13/23 03/30/23 1321       [REMOVED]      Altered Skin Integrity 03/09/23 1400 Right anterior Toe, second Blister(s) (Removed)   03/09/23 1400   Altered Skin Integrity Present on Admission - Did Patient arrive to the hospital with altered skin?: yes   Side: Right   Orientation: anterior   Location: Toe, second   Wound Number:    Is this injury device related?:    Primary Wound Type: Blister(s)   Description of Altered Skin Integrity:    Ankle-Brachial Index:    Pulses:    Removal Indication and Assessment:    Wound Outcome: Healed   (Retired) Wound Length (cm):    (Retired) Wound Width (cm):    (Retired) Depth (cm):    Wound Description (Comments):    Removal Indications:    Removed 03/30/23 1401   Wound Image   03/30/23 1321   Dressing Appearance Intact;Moist drainage 03/30/23 1321   Drainage Amount None 03/30/23 1321   Drainage Characteristics/Odor Serous 03/30/23 1321   Appearance Pink 03/30/23 1321   Tissue loss description Full thickness 03/30/23 1321   Red (%), Wound Tissue Color 100 % 03/30/23 1321   Periwound Area Dry;Intact 03/30/23 1321   Wound Length (cm) 0 cm 03/30/23 1321   Wound Width (cm) 0 cm 03/30/23 1321   Wound Depth (cm) 0 cm 03/30/23 1321   Wound Volume (cm^3) 0 cm^3 03/30/23 1321   Wound Surface Area (cm^2) 0 cm^2 03/30/23 1321       [REMOVED]      Altered Skin Integrity 03/09/23 1400 Left anterior Toe, second Blister(s) (Removed)   03/09/23 1400   Altered Skin Integrity Present on Admission - Did Patient arrive to the hospital with altered skin?: yes   Side: Left   Orientation: anterior   Location: Toe, second   Wound Number:    Is this injury device related?: No   Primary Wound Type: Blister(s)   Description of Altered Skin Integrity:    Ankle-Brachial Index:    Pulses:    Removal Indication and Assessment:    Wound Outcome: Healed   (Retired) Wound Length (cm):    (Retired) Wound Width (cm):    (Retired) Depth (cm):    Wound Description  (Comments):    Removal Indications:    Removed 03/30/23 1403   Wound Image   03/30/23 1321   Dressing Appearance Dry;Dried drainage 03/30/23 1321   Drainage Amount Scant 03/30/23 1321   Drainage Characteristics/Odor Serous 03/30/23 1321   Appearance Pink 03/30/23 1321   Tissue loss description Partial thickness 03/30/23 1321   Red (%), Wound Tissue Color 100 % 03/30/23 1321   Periwound Area Intact;Denuded 03/30/23 1321   Wound Length (cm) 0 cm 03/30/23 1321   Wound Width (cm) 0 cm 03/30/23 1321   Wound Depth (cm) 0 cm 03/30/23 1321   Wound Volume (cm^3) 0 cm^3 03/30/23 1321   Wound Surface Area (cm^2) 0 cm^2 03/30/23 1321   Dressing Change Due 04/13/23 03/30/23 1321         Assessment/Plan:         ICD-10-CM ICD-9-CM   1. Venous insufficiency of both lower extremities  I87.2 459.81   2. Lymphedema  I89.0 457.1   3. Type 2 diabetes mellitus with diabetic polyneuropathy, with long-term current use of insulin  E11.42 250.60    Z79.4 357.2     V58.67   4. Venous ulcer of both lower extremities with varicose veins  I83.019 454.0    I83.029     L97.919     L97.929            Tissue pathology and/or culture taken:  [] Yes [x] No   Sharp debridement performed:   [] Yes [x] No   Labs ordered this visit:   [] Yes [x] No   Imaging ordered this visit:   [] Yes [x] No         Right and left lower legs, right second toe left second toe wounds:     Cleanse wound with: Normal Saline   Lidocaine:PRN   Primary dressing:  xeroform to all satellite lesions and small openings     Edema control: Co-flex with calamine to BLE toes to knee, protect bilateral toes with small abd pad     Follow-up:  Dr. Perry 2 weeks   Home Health: [facility]: Please use supplies as per order above. Change dressings weekly and PRN except when the patient is in clinic.  Thank you.       Follow up in about 2 weeks (around 4/13/2023).

## 2023-03-31 ENCOUNTER — HOSPITAL ENCOUNTER (EMERGENCY)
Facility: HOSPITAL | Age: 78
Discharge: HOME | End: 2023-04-01
Attending: STUDENT IN AN ORGANIZED HEALTH CARE EDUCATION/TRAINING PROGRAM
Payer: MEDICARE

## 2023-03-31 DIAGNOSIS — M25.559 HIP PAIN: ICD-10-CM

## 2023-03-31 DIAGNOSIS — M25.519 SHOULDER PAIN: ICD-10-CM
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
## 2023-03-31 LAB
ALBUMIN SERPL BCP-MCNC: 2.7 G/DL (ref 3.5–5.2)
ALP SERPL-CCNC: 203 U/L (ref 55–135)
ALT SERPL W/O P-5'-P-CCNC: 13 U/L (ref 10–44)
ANION GAP SERPL CALC-SCNC: 11 MMOL/L (ref 8–16)
AST SERPL-CCNC: 20 U/L (ref 10–40)
BASOPHILS # BLD AUTO: 0.03 K/UL (ref 0–0.2)
BASOPHILS NFR BLD: 0.6 % (ref 0–1.9)
BILIRUB SERPL-MCNC: 0.6 MG/DL (ref 0.1–1)
BUN SERPL-MCNC: 20 MG/DL (ref 8–23)
CALCIUM SERPL-MCNC: 8.9 MG/DL (ref 8.7–10.5)
CHLORIDE SERPL-SCNC: 109 MMOL/L (ref 95–110)
CO2 SERPL-SCNC: 23 MMOL/L (ref 23–29)
CREAT SERPL-MCNC: 1.7 MG/DL (ref 0.5–1.4)
DIFFERENTIAL METHOD: ABNORMAL
EOSINOPHIL # BLD AUTO: 0 K/UL (ref 0–0.5)
EOSINOPHIL NFR BLD: 0.6 % (ref 0–8)
ERYTHROCYTE [DISTWIDTH] IN BLOOD BY AUTOMATED COUNT: 14.9 % (ref 11.5–14.5)
EST. GFR  (NO RACE VARIABLE): 30.1 ML/MIN/1.73 M^2
GLUCOSE SERPL-MCNC: 191 MG/DL (ref 70–110)
HCT VFR BLD AUTO: 33.8 % (ref 37–48.5)
HCV AB SERPL QL IA: NORMAL
HGB BLD-MCNC: 9.9 G/DL (ref 12–16)
HIV 1+2 AB+HIV1 P24 AG SERPL QL IA: NORMAL
IMM GRANULOCYTES # BLD AUTO: 0.01 K/UL (ref 0–0.04)
IMM GRANULOCYTES NFR BLD AUTO: 0.2 % (ref 0–0.5)
LYMPHOCYTES # BLD AUTO: 0.8 K/UL (ref 1–4.8)
LYMPHOCYTES NFR BLD: 16.7 % (ref 18–48)
MCH RBC QN AUTO: 24.5 PG (ref 27–31)
MCHC RBC AUTO-ENTMCNC: 29.3 G/DL (ref 32–36)
MCV RBC AUTO: 84 FL (ref 82–98)
MONOCYTES # BLD AUTO: 0.4 K/UL (ref 0.3–1)
MONOCYTES NFR BLD: 7.3 % (ref 4–15)
NEUTROPHILS # BLD AUTO: 3.7 K/UL (ref 1.8–7.7)
NEUTROPHILS NFR BLD: 74.6 % (ref 38–73)
NRBC BLD-RTO: 0 /100 WBC
PLATELET # BLD AUTO: 239 K/UL (ref 150–450)
PMV BLD AUTO: 9.9 FL (ref 9.2–12.9)
POCT GLUCOSE: 192 MG/DL (ref 70–110)
POTASSIUM SERPL-SCNC: 4.6 MMOL/L (ref 3.5–5.1)
PROT SERPL-MCNC: 7 G/DL (ref 6–8.4)
RBC # BLD AUTO: 4.04 M/UL (ref 4–5.4)
SODIUM SERPL-SCNC: 143 MMOL/L (ref 136–145)
WBC # BLD AUTO: 4.96 K/UL (ref 3.9–12.7)

## 2023-03-31 PROCEDURE — 99284 EMERGENCY DEPT VISIT MOD MDM: CPT | Mod: HCNC,,, | Performed by: STUDENT IN AN ORGANIZED HEALTH CARE EDUCATION/TRAINING PROGRAM

## 2023-03-31 PROCEDURE — 80053 COMPREHEN METABOLIC PANEL: CPT | Mod: HCNC | Performed by: STUDENT IN AN ORGANIZED HEALTH CARE EDUCATION/TRAINING PROGRAM

## 2023-03-31 PROCEDURE — 86803 HEPATITIS C AB TEST: CPT | Mod: HCNC | Performed by: PHYSICIAN ASSISTANT

## 2023-03-31 PROCEDURE — 93010 ELECTROCARDIOGRAM REPORT: CPT | Mod: HCNC,,, | Performed by: INTERNAL MEDICINE

## 2023-03-31 PROCEDURE — 85025 COMPLETE CBC W/AUTO DIFF WBC: CPT | Mod: HCNC | Performed by: STUDENT IN AN ORGANIZED HEALTH CARE EDUCATION/TRAINING PROGRAM

## 2023-03-31 PROCEDURE — 93005 ELECTROCARDIOGRAM TRACING: CPT | Mod: HCNC

## 2023-03-31 PROCEDURE — 82962 GLUCOSE BLOOD TEST: CPT | Mod: HCNC

## 2023-03-31 PROCEDURE — 93010 EKG 12-LEAD: ICD-10-PCS | Mod: HCNC,,, | Performed by: INTERNAL MEDICINE

## 2023-03-31 PROCEDURE — 99284 PR EMERGENCY DEPT VISIT,LEVEL IV: ICD-10-PCS | Mod: HCNC,,, | Performed by: STUDENT IN AN ORGANIZED HEALTH CARE EDUCATION/TRAINING PROGRAM

## 2023-03-31 PROCEDURE — 99285 EMERGENCY DEPT VISIT HI MDM: CPT | Mod: 25,HCNC

## 2023-03-31 PROCEDURE — 87389 HIV-1 AG W/HIV-1&-2 AB AG IA: CPT | Mod: HCNC | Performed by: PHYSICIAN ASSISTANT

## 2023-03-31 RX ORDER — ACETAMINOPHEN 500 MG
1000 TABLET ORAL
Status: DISCONTINUED | OUTPATIENT
Start: 2023-03-31 | End: 2023-04-01 | Stop reason: HOSPADM

## 2023-04-01 VITALS
TEMPERATURE: 98 F | BODY MASS INDEX: 42.29 KG/M2 | OXYGEN SATURATION: 100 % | WEIGHT: 270 LBS | RESPIRATION RATE: 16 BRPM | HEART RATE: 66 BPM | SYSTOLIC BLOOD PRESSURE: 179 MMHG | DIASTOLIC BLOOD PRESSURE: 89 MMHG

## 2023-04-01 NOTE — ED NOTES
Patients daughter with questions at this time. Maddy COLON at bedside explaining discharge instructions and answering questions.

## 2023-04-01 NOTE — ED NOTES
Pt daughter Jazmyn called by this RN and notified that pt is placed for DC - pt stable and daughter coming to  pt at this time. Will DC upon daughter arrival .

## 2023-04-01 NOTE — PLAN OF CARE
03/31/23 [id number]   Post-Acute Status   Post-Acute Authorization Home Health   Home Health Status Referrals Sent   Discharge Delays None known at this time   Discharge Plan   Discharge Plan A Home;Home Health   Discharge Plan B Home;Home Health     SW sent referrals to home health agencies.     SINTIA Mata, MSW-LMSW  Medical Social Worker/  ER Department

## 2023-04-01 NOTE — DISCHARGE INSTRUCTIONS
Thank you for coming to our Emergency Department today. It is important to remember that some problems or medical conditions are difficult to diagnose and may not be found or addressed during your Emergency Department visit.     Be sure to follow up with your primary care doctor and review all labs/imaging/tests that were performed during your ER visit with them. Some labs/imaging/tests may be outside of the normal range, and require non-emergent follow-up and/or further investigation/treatment/procedures/testing to help diagnose/exclude/prevent complications or other potentially serious medical conditions that were not discussed or addressed during your ER visit.    If you do not have a primary care doctor, you may contact the one listed on your discharge paperwork or you may also call the [facility] Appointment Desk at [phone] to schedule an appointment and establish care with one. It is important to your health that you have a primary care doctor.    Please take all medications as directed. All medications may potentially have side-effects and it is impossible to predict which medications may give you side-effects or what side-effects (if any) they will give you.. If you feel that you are having a negative effect or side-effect of any medication you should immediately stop taking them and seek medical attention. If you feel that you are having a life-threatening reaction call 911.    Return to the ER with any questions/concerns, new/concerning symptoms, worsening or failure to improve.     Do not drive, swim, climb to height, take a bath, operate heavy machinery, drink alcohol or take potentially sedating medications, sign any legal documents or make any important decisions for 24 hours if you have received any pain medications, sedatives or mood altering drugs during your ER visit or within 24 hours of taking them if they have been prescribed to you.     You can find additional resources for Dentists,  hearing aids, durable medical equipment, low cost pharmacies and other resources at [email or web address]    BELOW THIS LINE ONLY APPLIES IF YOU HAVE A COVID TEST PENDING OR IF YOU HAVE BEEN DIAGNOSED WITH COVID:  Please access [facility] to review the results of your test. Until the results of your COVID test return, you should isolate yourself so as not to potentially spread illness to others.   If your COVID test returns positive, you should isolate yourself so as not to spread illness to others. After five full days, if you are feeling better and you have not had fever for 24 hours, you can return to your typical daily activities, but you must wear a mask around others for an additional 5 days.   If your COVID test returns negative and you are either unvaccinated or more than six months out from your two-dose vaccine and are not yet boosted, you should still quarantine for 5 full days followed by strict mask use for an additional 5 full days.   If your COVID test returns negative and you have received your 2-dose initial vaccine as well as a booster, you should continue strict mask use for 10 full days after the exposure.  For all those exposed, best practice includes a test at day 5 after the exposure. This can be a home test or a test through one of the many testing centers throughout our community.   Masking is always advised to limit the spread of COVID. Cdc.gov is an excellent site to obtain the latest up to date recommendations regarding COVID and COVID testing.     CDC Testing and Quarantine Guidelines for patients with exposure to a known-positive COVID-19 person:  A close exposure is defined as anyone who has had an exposure (masked or unmasked) to a known COVID -19 positive person within 6 feet of someone for a cumulative total of 15 minutes or more over a 24-hour period.   Vaccinated and/or if you recently had a positive covid test within 90 days do NOT need to quarantine after contact with someone  who had COVID-19 unless you develop symptoms.   Fully vaccinated people who have not had a positive test within 90 days, should get tested 3-5 days after their exposure, even if they don't have symptoms and wear a mask indoors in public for 14 days following exposure or until their test result is negative.      Unvaccinated and/or NOT had a positive test within 90 days and meet close exposure  You are required by CDC guidelines to quarantine for at least 5 days from time of exposure followed by 5 days of strict masking. It is recommended, but not required to test after 5 days, unless you develop symptoms, in which case you should test at that time.  If you get tested after 5 days and your test is positive, your 5 day period of isolation starts the day of the positive test.    If your exposure does not meet the above definition, you can return to your normal daily activities to include social distancing, wearing a mask and frequent handwashing.      Here is a link to guidance from the CDC:  https://www.cdc.gov/media/releases/2021/s1227-isolation-quarantine-guidance.html      [facility] Testing Sites:  https://ldh.la.gov/page/3934      [facility] website with testing locations [email or web address] guidance:  [email or web address]

## 2023-04-01 NOTE — ED PROVIDER NOTES
Encounter Date: 3/31/2023       History     Chief Complaint   Patient presents with    Fall     +hit head, hematoma noted; denies LOC, blood thinner usage     Ms. Ortiz is an 78-year-old female with past medical history of hypertension, diabetes and chronic kidney disease who presents to the emergency department due to a fall. Patient states that she usually is wheelchair about and that she was in her motorized wheelchair in her backyard when she had reached forward to get something slipping out of the wheelchair falling on her left side she states she did not hit her head and then lose consciousness but she was unable to get up and so she called EMS who brought her to the emergency department.  This has is that she is having pain and has left hip as well as her bilateral shoulders.     The history is provided by the patient and the EMS personnel.   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    Benign essential hypertension 11/14/2012    Cataract     s/p phacoemulsification    Chronic kidney disease (CKD), stage III (moderate) 9/12/2013    Diabetic peripheral neuropathy associated with type 2 diabetes mellitus 11/14/2014    causing right hemiparesis    Gait disorder     Hyperlipidemia     Iritis - Both Eyes 6/10/2013    Kidney stone     Lymphedema     Morbid obesity with BMI of 40.0-44.9, adult 2/18/2015    Nephrolithiasis 4/20/2016    NS (nuclear sclerosis) 4/1/2013    Nuclear sclerosis - Both Eyes 4/29/2013    Preseptal cellulitis - Right Eye 4/29/2013    Proliferative diabetic retinopathy -  Both Eyes 4/29/2013    Proliferative diabetic retinopathy, both eyes 4/1/2013    PSC (posterior subcapsular cataract) - Both Eyes 4/29/2013    S/P hernia repair 12/19/2012    TIA (transient ischemic attack) 11/18/2014    Tinea pedis 7/24/2012    Tinea pedis is present on both feet.     Type 2 diabetes mellitus with diabetic polyneuropathy, with long-term current use of insulin 9/18/2015    Type 2 diabetes mellitus with renal manifestations, controlled 12/12/2013    Type 2 diabetes, controlled, with moderate nonproliferative diabetic retinopathy without macular edema 9/17/2015    Ulcer of left lower extremity, limited to breakdown of skin 7/8/2015    Unspecified cerebral artery occlusion with cerebral infarction 11/16/2014    Unspecified venous (peripheral) insufficiency     Ureteral stone with hydronephrosis 1/27/2016    UTI (lower urinary tract infection)     Vaginal infection     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     Past Surgical History:   Procedure Laterality Date    ABLATION Bilateral 6/23/2022    Procedure: Ablation;  Surgeon: Vahid Farfan MD;  Location: [facility] CATH LAB/EP;  Service: Cardiology;  Laterality: Bilateral;    ABLATION Right 11/17/2022    Procedure: Ablation;  Surgeon: Vahid Farfan MD;  Location: [facility] CATH LAB/EP;  Service: Cardiology;  Laterality: Right;    APPENDECTOMY      CATARACT EXTRACTION W/  INTRAOCULAR LENS IMPLANT Left 5/21/2013    CATARACT EXTRACTION W/  INTRAOCULAR LENS IMPLANT Right 6/4/2013    CHOLECYSTECTOMY      COLONOSCOPY  12/22/2005    normal    COLONOSCOPY N/A 7/14/2022    Procedure: COLONOSCOPY;  Surgeon: Kannan Mace MD;  Location: [facility];  Service: Endoscopy;  Laterality: N/A;    ESOPHAGOGASTRODUODENOSCOPY  12/21/2015    hiatal hernia, Schatzki ring    ESOPHAGOGASTRODUODENOSCOPY N/A 7/13/2022    Procedure: EGD (ESOPHAGOGASTRODUODENOSCOPY);  Surgeon: Kannan Mace MD;  Location: [facility];  Service: Endoscopy;  Laterality: N/A;    EYE SURGERY Bilateral  2008    laser surgery both eyes    INTRALUMINAL GASTROINTESTINAL TRACT IMAGING VIA CAPSULE N/A 7/15/2022    Procedure: IMAGING PROCEDURE, GI TRACT, INTRALUMINAL, VIA CAPSULE;  Surgeon: Kannan Mace MD;  Location: [facility] ENDO;  Service: Endoscopy;  Laterality: N/A;    NASAL SEPTUM SURGERY      SMALL BOWEL ENTEROSCOPY N/A 2022    Procedure: ENTEROSCOPY Upper SBE;  Surgeon: Salo Frank MD;  Location: [facility] ENDO;  Service: Endoscopy;  Laterality: N/A;    SUBTOTAL COLECTOMY  2012    transverse colon, for incarcerated umbilical hernia, Dr. Kat Bower     Family History   Problem Relation Age of Onset    Diabetes Sister     Cataracts Sister     Heart disease Brother     Cataracts Brother     Leukemia Mother     Cancer Neg Hx     Amblyopia Neg Hx     Blindness Neg Hx     Glaucoma Neg Hx     Hypertension Neg Hx     Macular degeneration Neg Hx     Retinal detachment Neg Hx     Strabismus Neg Hx     Stroke Neg Hx     Thyroid disease Neg Hx     Kidney disease Neg Hx      Social History     Tobacco Use    Smoking status: Former     Packs/day: 0.50     Years: 15.00     Pack years: 7.50     Types: Cigarettes     Quit date: 1982     Years since quittin.7    Smokeless tobacco: Former    Tobacco comments:     smoked one pack per week   Substance Use Topics    Alcohol use: No    Drug use: No     Review of Systems   Constitutional:  Negative for chills, diaphoresis, fatigue and fever.   HENT:  Negative for congestion, rhinorrhea and sore throat.    Eyes:  Negative for visual disturbance.   Respiratory:  Negative for cough, chest tightness and shortness of breath.    Cardiovascular:  Negative for chest pain.   Gastrointestinal:  Negative for abdominal pain, blood in stool, constipation, diarrhea and vomiting.   Genitourinary:  Negative for dysuria, hematuria and urgency.   Musculoskeletal:  Positive for arthralgias and myalgias. Negative for back pain.   Skin:  Negative for rash.   Neurological:   Positive for headaches. Negative for seizures and syncope.   Hematological:  Does not bruise/bleed easily.   Psychiatric/Behavioral:  Negative for agitation and hallucinations.      Physical Exam     Initial Vitals [03/31/23 1737]   BP Pulse Resp Temp SpO2   (!) 186/84 66 18 97.6 °F (36.4 °C) 96 %      MAP       --         Physical Exam     Nursing note and vitals reviewed.  Constitutional: Patient appears well-developed and well-nourished. No distress. AxOx3, NAD, well nourished, appears stated age  HENT:   Head: Normocephalic   Bruise noted to right eye.   Bruising noted to right side of face.  Eyes: Conjunctivae and EOM are normal. Pupils are equal, round, and reactive to light. no scleral icterus, no periorbital edema or ecchymosis  Neck: Neck supple. no stridor, no masses, no drooling or voice changes  Normal range of motion.  Cardiovascular: Normal rate, regular rhythm, normal heart sounds and intact distal pulses. no m/r/g  Pulmonary/Chest: Breath sounds normal. CTAB, no wheezes, rales or rhonchi, no increased work of breathing  Abdominal: Abdomen is soft. Patient exhibits no distension. There is no abdominal tenderness. no organomegaly, no CVAT  Musculoskeletal:      Cervical back: Normal range of motion and neck supple.   Neurological: Patient is alert and oriented to person, place, and time. No cranial nerve deficit.. Moving all extremities, face grossly symmetric  Skin: Skin is warm and dry.  Ext: no edema, no lesions, rashes, or deformity  Psych: Normal mood/affect,cooperative, well groomed, makes good eye contact        ED Course   Procedures  Labs Reviewed   CBC W/ AUTO DIFFERENTIAL - Abnormal; Notable for the following components:       Result Value    Hemoglobin 9.9 (*)     Hematocrit 33.8 (*)     MCH 24.5 (*)     MCHC 29.3 (*)     RDW 14.9 (*)     Lymph # 0.8 (*)     Gran % 74.6 (*)     Lymph % 16.7 (*)     All other components within normal limits   COMPREHENSIVE METABOLIC PANEL - Abnormal; Notable  for the following components:    Glucose 191 (*)     Creatinine 1.7 (*)     Albumin 2.7 (*)     Alkaline Phosphatase 203 (*)     eGFR 30.1 (*)     All other components within normal limits   POCT GLUCOSE - Abnormal; Notable for the following components:    POCT Glucose 192 (*)     All other components within normal limits   HIV 1 / 2 ANTIBODY    Narrative:     Release to patient->Immediate   HEPATITIS C ANTIBODY    Narrative:     Release to patient->Immediate   POCT GLUCOSE MONITORING CONTINUOUS     EKG Readings: (Independently Interpreted)   Initial Reading: No STEMI. Heart Rate: 64.   Rate controlled atrial fibrillation     Imaging Results              X-Ray Chest AP Portable (Final result)  Result time 03/31/23 20:23:01      Final result by Ron Luna MD (03/31/23 20:23:01)                   Impression:      As above.    Electronically signed by resident: Darryn Patino  Date:    03/31/2023  Time:    20:05    Electronically signed by: Ron Luna MD  Date:    03/31/2023  Time:    20:23               Narrative:    EXAMINATION:  XR CHEST AP PORTABLE    CLINICAL HISTORY:  Unspecified fall, initial encounter    TECHNIQUE:  Single frontal view of the chest was performed.    COMPARISON:  Chest radiograph 07/12/2022; CT renal stone study 05/13/2021    FINDINGS:  Mediastinal structures are midline.  Cardiac silhouette is stable.  Stable prominent epicardial fat on the left with probable small volume pleural fluid.  Lungs are clear and symmetrically expanded.  No evidence of new consolidation or pleural fluid.  No pneumothorax.  Atherosclerotic calcification of the aortic arch.                                       X-Ray Shoulder Trauma Bilateral (Final result)  Result time 03/31/23 20:06:53      Final result by Ron Luna MD (03/31/23 20:06:53)                   Impression:      No fracture or dislocation.    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    Electronically  signed by resident: Darryn Patino  Date:    03/31/2023  Time:    20:01    Electronically signed by: Ron Luna MD  Date:    03/31/2023  Time:    20:06               Narrative:    EXAMINATION:  XR SHOULDER TRAUMA 3 VIEW BILATERAL    CLINICAL HISTORY:  Pain in unspecified shoulder    TECHNIQUE:  Three x-ray views of both shoulders.    COMPARISON:  None.    FINDINGS:  Right:    Bones: No fracture.  No lytic or blastic lesion.    Joints: No evidence for glenohumeral dislocation.  Severe glenohumeral joint space loss with narrowing of the acromiohumeral interval, superior subluxation of the humerus with respect to the glenoid, and undersurface remodeling of the acromion.  These findings suggest chronic massive rotator cuff tear.  Acromioclavicular joint is moderately to severely hypertrophic.    Soft tissues: Unremarkable.    Left:    Bones: No fracture.  No lytic or blastic lesion.    Joints: No evidence for glenohumeral dislocation.  Moderate glenohumeral joint loss.  Acromioclavicular joint is moderately hypertrophic.    Soft tissues: Unremarkable.                                       X-Ray Hips Bilateral 2 View Incl AP Pelvis (Final result)  Result time 03/31/23 20:24:43      Final result by Ron Luna MD (03/31/23 20:24:43)                   Impression:      Generalized osteopenia.  No fracture or dislocation.  Follow-up, as clinically warranted.    Electronically signed by resident: Darryn Patino  Date:    03/31/2023  Time:    19:57    Electronically signed by: Ron Luna MD  Date:    03/31/2023  Time:    20:24               Narrative:    EXAMINATION:  XR HIPS BILATERAL 2 VIEW INCL AP PELVIS    CLINICAL HISTORY:  Pain in unspecified hip    TECHNIQUE:  AP view of the pelvis and frogleg lateral views of both hips were performed.    COMPARISON:  Pelvis radiograph 09/12/2003, bilateral hip radiograph 08/14/2003.    FINDINGS:  Generalized osteopenia.    Right: No fracture or dislocation.  Mild hip cartilage space  loss.    Left: No fracture or dislocation.  Mild hip cartilage space loss.    Pelvis: No lytic or blastic lesion.  Phleboliths in the lower pelvis with extensive vascular calcification in the visualized right lower extremity.  Advanced degenerative changes in the lower lumbar spine.                                       X-Ray Tibia Fibula 2 View Right (Final result)  Result time 03/31/23 19:47:37      Final result by Annemarie Vance MD (03/31/23 19:47:37)                   Impression:      As above.      Electronically signed by: Annemarie Vance  Date:    03/31/2023  Time:    19:47               Narrative:    EXAMINATION:  XR TIBIA FIBULA 2 VIEW RIGHT    CLINICAL HISTORY:  Unspecified fall, initial encounter    TECHNIQUE:  AP and lateral views of the right tibia and fibula were performed.    COMPARISON:  None.    FINDINGS:  Osteopenia.  No acute fracture or dislocation.  Advanced medial tibiofemoral compartment osteoarthritis.  Small knee joint effusion.  Small linear radiopaque foreign body projecting over the midfoot and calcaneus on lateral projection.  Soft tissue calcifications are seen.                                       CT Head Without Contrast (Final result)  Result time 03/31/23 20:15:27      Final result by Ron Luna MD (03/31/23 20:15:27)                   Impression:      1. Right frontal scalp hematoma with right supraorbital soft tissue swelling.  2. No acute intracranial pathology.  3. No cervical spine fracture or traumatic malalignment.  4. Multilevel cervical spondylosis worst at C5-C6.  5. No acute maxillofacial fracture or malalignment.  6. Small left-sided pleural effusion.    Electronically signed by resident: Darryn Patino  Date:    03/31/2023  Time:    19:29    Electronically signed by: Ron Luna MD  Date:    03/31/2023  Time:    20:15               Narrative:    EXAMINATION:  CT HEAD WITHOUT CONTRAST; CT CERVICAL SPINE WITHOUT CONTRAST; CT MAXILLOFACIAL WITHOUT  CONTRAST    CLINICAL HISTORY:  Head trauma, minor (Age >= 65y);; Neck trauma (Age >= 65y);; Nasal fracture suspected;    TECHNIQUE:  Low dose axial CT images obtained throughout the head and cervical spine without the use of intravenous contrast. Axial, sagittal and coronal reconstructions were performed.  Additionally, thin sliced axial images of the maxillofacial region were obtained with multiplanar reformats.    COMPARISON:  MRI/MRA brain 11/16/2014; CT head 11/16/2014    FINDINGS:  CT head:    The ventricles and sulci are normal in size for age without evidence of hydrocephalus.    The brain parenchyma is notable for mild patchy hypoattenuation in the supratentorial white matter which likely reflects chronic microvascular ischemic changes.  No parenchymal mass, hemorrhage, edema, or major vascular distribution infarct.    No extra-axial blood or fluid collections.    The bony calvarium is intact without acute displaced fracture.  Subcentimeter osteoma versus focal calvarial undulation over the right frontal region arising from the inner table of the skull.  Mastoid air cells are essentially clear.    CT maxillofacial:    Crescentic high density focal soft tissue swelling over the right frontal calvarium with mild generalized swelling over the right orbit.  0.7 cm rounded skin lesion adjacent to the left side of the nose, possibly a nevus.    The osseous structures appear intact, without evidence of displaced fracture. Temporomandibular joints appear appropriately positioned.  There are postoperative changes related to prior cataract extraction and bilateral globes.  No evidence of globe rupture or vitreous hemorrhage.  The intraorbital fat appears clear without abnormal stranding or fluid.  Optic nerves are normal and symmetric.    The paranasal sinuses are essentially clear.  Leftward deviation of the bony nasal septum.  No displaced nasal bone fracture.  Patient is edentulous.    CT cervical spine:    Spinal  Alignment: Loss of the cervical lordosis with otherwise normal alignment.    Vertebrae: Generalized osteopenia.  No acute fracture. Vertebral body heights are well maintained. No abnormal osseous lesions.    Discs: Multilevel intervertebral disc height loss throughout the cervical spine worst at C5-C6.    Degenerative findings:    *C2-C3: No neural foraminal narrowing or spinal canal stenosis.  *C3-C4: Moderate left and mild right facet hypertrophy.  No neural foraminal narrowing or spinal canal stenosis.  *C4-C5: Posterior disc osteophyte complex.  Moderate bilateral facet hypertrophy.  Mild bilateral neural foraminal narrowing.  No spinal canal stenosis.  *C5-C6: Posterior disc osteophyte complex.  Moderate left and mild right facet hypertrophy.  Moderate bilateral neural foraminal narrowing.  Mild spinal canal stenosis.  *C6-C7: Mild bilateral facet hypertrophy.  Mild left neural foraminal narrowing.  No spinal canal stenosis.  *C7-T1: Mild bilateral facet hypertrophy.  Mild left neural foraminal narrowing.  No spinal canal stenosis.  Paraspinal muscles & soft tissues: Normal.    Miscellaneous: Asymmetric enlargement of the right lobe of the thyroid gland which demonstrates heterogeneous attenuation without any focal measurable nodules. Medialized course of the bilateral carotid arteries along the prevertebral soft tissues significant calcified plaque in bilateral carotid bifurcations.  There is a small left-sided pleural effusion.                                       CT Cervical Spine Without Contrast (Final result)  Result time 03/31/23 20:15:27      Final result by Ron Luna MD (03/31/23 20:15:27)                   Impression:      1. Right frontal scalp hematoma with right supraorbital soft tissue swelling.  2. No acute intracranial pathology.  3. No cervical spine fracture or traumatic malalignment.  4. Multilevel cervical spondylosis worst at C5-C6.  5. No acute maxillofacial fracture or  malalignment.  6. Small left-sided pleural effusion.    Electronically signed by resident: Darryn Patino  Date:    03/31/2023  Time:    19:29    Electronically signed by: Ron Luna MD  Date:    03/31/2023  Time:    20:15               Narrative:    EXAMINATION:  CT HEAD WITHOUT CONTRAST; CT CERVICAL SPINE WITHOUT CONTRAST; CT MAXILLOFACIAL WITHOUT CONTRAST    CLINICAL HISTORY:  Head trauma, minor (Age >= 65y);; Neck trauma (Age >= 65y);; Nasal fracture suspected;    TECHNIQUE:  Low dose axial CT images obtained throughout the head and cervical spine without the use of intravenous contrast. Axial, sagittal and coronal reconstructions were performed.  Additionally, thin sliced axial images of the maxillofacial region were obtained with multiplanar reformats.    COMPARISON:  MRI/MRA brain 11/16/2014; CT head 11/16/2014    FINDINGS:  CT head:    The ventricles and sulci are normal in size for age without evidence of hydrocephalus.    The brain parenchyma is notable for mild patchy hypoattenuation in the supratentorial white matter which likely reflects chronic microvascular ischemic changes.  No parenchymal mass, hemorrhage, edema, or major vascular distribution infarct.    No extra-axial blood or fluid collections.    The bony calvarium is intact without acute displaced fracture.  Subcentimeter osteoma versus focal calvarial undulation over the right frontal region arising from the inner table of the skull.  Mastoid air cells are essentially clear.    CT maxillofacial:    Crescentic high density focal soft tissue swelling over the right frontal calvarium with mild generalized swelling over the right orbit.  0.7 cm rounded skin lesion adjacent to the left side of the nose, possibly a nevus.    The osseous structures appear intact, without evidence of displaced fracture. Temporomandibular joints appear appropriately positioned.  There are postoperative changes related to prior cataract extraction and bilateral globes.   No evidence of globe rupture or vitreous hemorrhage.  The intraorbital fat appears clear without abnormal stranding or fluid.  Optic nerves are normal and symmetric.    The paranasal sinuses are essentially clear.  Leftward deviation of the bony nasal septum.  No displaced nasal bone fracture.  Patient is edentulous.    CT cervical spine:    Spinal Alignment: Loss of the cervical lordosis with otherwise normal alignment.    Vertebrae: Generalized osteopenia.  No acute fracture. Vertebral body heights are well maintained. No abnormal osseous lesions.    Discs: Multilevel intervertebral disc height loss throughout the cervical spine worst at C5-C6.    Degenerative findings:    *C2-C3: No neural foraminal narrowing or spinal canal stenosis.  *C3-C4: Moderate left and mild right facet hypertrophy.  No neural foraminal narrowing or spinal canal stenosis.  *C4-C5: Posterior disc osteophyte complex.  Moderate bilateral facet hypertrophy.  Mild bilateral neural foraminal narrowing.  No spinal canal stenosis.  *C5-C6: Posterior disc osteophyte complex.  Moderate left and mild right facet hypertrophy.  Moderate bilateral neural foraminal narrowing.  Mild spinal canal stenosis.  *C6-C7: Mild bilateral facet hypertrophy.  Mild left neural foraminal narrowing.  No spinal canal stenosis.  *C7-T1: Mild bilateral facet hypertrophy.  Mild left neural foraminal narrowing.  No spinal canal stenosis.  Paraspinal muscles & soft tissues: Normal.    Miscellaneous: Asymmetric enlargement of the right lobe of the thyroid gland which demonstrates heterogeneous attenuation without any focal measurable nodules. Medialized course of the bilateral carotid arteries along the prevertebral soft tissues significant calcified plaque in bilateral carotid bifurcations.  There is a small left-sided pleural effusion.                                       CT Maxillofacial Without Contrast (Final result)  Result time 03/31/23 20:15:27      Final result by  Ron Luna MD (03/31/23 20:15:27)                   Impression:      1. Right frontal scalp hematoma with right supraorbital soft tissue swelling.  2. No acute intracranial pathology.  3. No cervical spine fracture or traumatic malalignment.  4. Multilevel cervical spondylosis worst at C5-C6.  5. No acute maxillofacial fracture or malalignment.  6. Small left-sided pleural effusion.    Electronically signed by resident: Darryn Patino  Date:    03/31/2023  Time:    19:29    Electronically signed by: Ron Luna MD  Date:    03/31/2023  Time:    20:15               Narrative:    EXAMINATION:  CT HEAD WITHOUT CONTRAST; CT CERVICAL SPINE WITHOUT CONTRAST; CT MAXILLOFACIAL WITHOUT CONTRAST    CLINICAL HISTORY:  Head trauma, minor (Age >= 65y);; Neck trauma (Age >= 65y);; Nasal fracture suspected;    TECHNIQUE:  Low dose axial CT images obtained throughout the head and cervical spine without the use of intravenous contrast. Axial, sagittal and coronal reconstructions were performed.  Additionally, thin sliced axial images of the maxillofacial region were obtained with multiplanar reformats.    COMPARISON:  MRI/MRA brain 11/16/2014; CT head 11/16/2014    FINDINGS:  CT head:    The ventricles and sulci are normal in size for age without evidence of hydrocephalus.    The brain parenchyma is notable for mild patchy hypoattenuation in the supratentorial white matter which likely reflects chronic microvascular ischemic changes.  No parenchymal mass, hemorrhage, edema, or major vascular distribution infarct.    No extra-axial blood or fluid collections.    The bony calvarium is intact without acute displaced fracture.  Subcentimeter osteoma versus focal calvarial undulation over the right frontal region arising from the inner table of the skull.  Mastoid air cells are essentially clear.    CT maxillofacial:    Crescentic high density focal soft tissue swelling over the right frontal calvarium with mild generalized swelling  over the right orbit.  0.7 cm rounded skin lesion adjacent to the left side of the nose, possibly a nevus.    The osseous structures appear intact, without evidence of displaced fracture. Temporomandibular joints appear appropriately positioned.  There are postoperative changes related to prior cataract extraction and bilateral globes.  No evidence of globe rupture or vitreous hemorrhage.  The intraorbital fat appears clear without abnormal stranding or fluid.  Optic nerves are normal and symmetric.    The paranasal sinuses are essentially clear.  Leftward deviation of the bony nasal septum.  No displaced nasal bone fracture.  Patient is edentulous.    CT cervical spine:    Spinal Alignment: Loss of the cervical lordosis with otherwise normal alignment.    Vertebrae: Generalized osteopenia.  No acute fracture. Vertebral body heights are well maintained. No abnormal osseous lesions.    Discs: Multilevel intervertebral disc height loss throughout the cervical spine worst at C5-C6.    Degenerative findings:    *C2-C3: No neural foraminal narrowing or spinal canal stenosis.  *C3-C4: Moderate left and mild right facet hypertrophy.  No neural foraminal narrowing or spinal canal stenosis.  *C4-C5: Posterior disc osteophyte complex.  Moderate bilateral facet hypertrophy.  Mild bilateral neural foraminal narrowing.  No spinal canal stenosis.  *C5-C6: Posterior disc osteophyte complex.  Moderate left and mild right facet hypertrophy.  Moderate bilateral neural foraminal narrowing.  Mild spinal canal stenosis.  *C6-C7: Mild bilateral facet hypertrophy.  Mild left neural foraminal narrowing.  No spinal canal stenosis.  *C7-T1: Mild bilateral facet hypertrophy.  Mild left neural foraminal narrowing.  No spinal canal stenosis.  Paraspinal muscles & soft tissues: Normal.    Miscellaneous: Asymmetric enlargement of the right lobe of the thyroid gland which demonstrates heterogeneous attenuation without any focal measurable nodules.  Medialized course of the bilateral carotid arteries along the prevertebral soft tissues significant calcified plaque in bilateral carotid bifurcations.  There is a small left-sided pleural effusion.                                       Medications   acetaminophen tablet 1,000 mg (1,000 mg Oral Not Given 3/31/23 1900)     Medical Decision Making:   History:   Old Medical Records: I decided to obtain old medical records.  Old Records Summarized: records from previous admission(s).  Initial Assessment:   Ms. Ortiz is an 78-year-old female with past medical history of hypertension, diabetes and chronic kidney disease who presents to the emergency department due to a fall.   Differential Diagnosis:   Intracranial hemorrhage  Hip fracture  Tibia/fibula fracture  Rib fractures  Intra-abdominal injury    Independently Interpreted Test(s):   I have ordered and independently interpreted X-rays - see prior notes.  I have ordered and independently interpreted EKG Reading(s) - see prior notes  Clinical Tests:   Lab Tests: Ordered and Reviewed  Radiological Study: Ordered and Reviewed  ED Management:  Patient was examined, given her mechanism of injury I was concerned for possible trauma.  Patient was fully exposed in other to assess for any injuries.  CT of patient's head was obtained which did not show any significant findings  CT of the neck was obtained which also did not show significant finding  X-ray of was obtained which did not show fracture dislocation  X-rays were also obtained of which also did not show significant fracture or dislocation  Patient was able to ambulate at her baseline level of ambulation  Given that patient was back to her baseline and I did not find any significant injuries from the fall I felt the patient was stable for discharge at this time  She was advised to follow-up the primary care physician concerning a fall  Patient had initially stated that she did not have any want to help her at home  and so I advised patient that we would be able to admit her to the hospital to place her in a nursing home she did not feel comfortable taking care of herself at home patient then stated that she felt comfortable taking care of herself but she could benefit from some additional help and so discussion was had with social work and patient was provided with home health order services.  Patient was reassessed again to confirm that she did not want to be admitted to be placed into a nursing home and patient stated that under no circumstances would she be comfortable staying in a nursing home and she wanted to be discharged to her home.   Given that patient stated that she felt comfortable going home I felt comfortable discharging her and so she was discharged.   She was discharged in stable condition of the questions are given           ED Course as of 03/31/23 2328   Fri Mar 31, 2023   2054 Creatinine(!): 1.7  baseline [NN]   2054 Hemoglobin(!): 9.9  Baseline [NN]   2056 Imaging reviewed, no acute traumatic findings. [NN]      ED Course User Index  [NN] Angelica Sapp MD                 Clinical Impression:   Final diagnoses:  [W19.XXXA] Fall (Primary)  [M25.519] Shoulder pain  [M25.559] Hip pain        ED Disposition Condition    Discharge Stable          ED Prescriptions    None       Follow-up Information       Follow up With Specialties Details Why Contact Info    Darby Baum MD Internal Medicine Schedule an appointment as soon as possible for a visit in 3 days  [address]  [place] [address]  [phone]               Bill Castañeda MD  Resident  03/31/23 9423

## 2023-04-01 NOTE — PROVIDER PROGRESS NOTES - EMERGENCY DEPT.
Encounter Date: 3/31/2023    ED Physician Progress Notes      [facility] - [facility]      HOME HEALTH ORDERS  FACE TO FACE ENCOUNTER    Patient Name: Sherin Ortiz  YOB: 1945    PCP: Darby Baum MD   PCP Address: [address] / [place] [address]  PCP Phone Number: [phone]  PCP Fax: [phone]    Encounter Date: 3/31/23    Admit to Home Health    Diagnoses:  There are no hospital problems to display for this patient.      Follow Up Appointments:  Future Appointments   Date Time Provider Department Center   4/13/2023  2:00 PM Gretta Perry, DO [facility]   4/18/2023 10:00 AM NURSE, VISIT [facility]   5/26/2023  2:30 PM Jody Murray NP [facility]   6/29/2023  8:40 AM Darby Baum MD [facility] IM [facility]       Allergies:  Review of patient's allergies indicates:   Allergen Reactions    Penicillins Hives     Other reaction(s): Hives  Patient has received cefdinir, ceftriaxone, cefazolin and cefepime in the past with no documented reactions    Sulfa (sulfonamide antibiotics) Other (See Comments)     Eyad, pt states her doctor told her the shakes were possibly caused by an allergy to sulfa       Medications: Review discharge medications with patient and family and provide education.    Current Facility-Administered Medications   Medication Dose Route Frequency Provider Last Rate Last Admin    acetaminophen tablet 1,000 mg  1,000 mg Oral ED 1 Time Bill Castañeda MD         Current Outpatient Medications   Medication Sig Dispense Refill    aspirin 81 MG Chew Chew 1 tablet (81 mg total) by mouth once daily. 30 tablet 5    atorvastatin (LIPITOR) 40 MG tablet Take 1 tablet (40 mg total) by mouth every evening. 90 tablet 3    blood sugar diagnostic (TRUE METRIX GLUCOSE TEST STRIP) Strp TEST BLOOD SUGAR TWICE DAILY 200 strip 3    insulin glargine (LANTUS U-100 INSULIN) 100 unit/mL injection Inject 20 Units into the skin  "every evening. 18 mL 3    insulin syr/ndl U100 half yesenia (DROPLET INSULIN SYR,HALF UNIT,) 0.5 mL 30 gauge x 1/2" Syrg Use to administer insulin nightly (Patient not taking: Reported on 3/23/2023) 100 each 3    lancets (TRUEPLUS LANCETS) 33 gauge Misc TEST TWO TIMES DAILY 200 each 3    lisinopriL (PRINIVIL,ZESTRIL) 5 MG tablet Take 1 tablet (5 mg total) by mouth once daily. (Patient not taking: Reported on 2/3/2023) 90 tablet 3    pantoprazole (PROTONIX) 40 MG tablet Take 1 tablet (40 mg total) by mouth once daily. 90 tablet 3    sodium bicarbonate 650 MG tablet Take 1 tablet (650 mg total) by mouth 2 (two) times daily. 180 tablet 3    sodium zirconium cyclosilicate (LOKELMA) 5 gram packet Take 1 packet (5 g total) by mouth every other day. Mix entire contents of packet(s) into drinking glass containing 3 tablespoons of water; stir well and drink immediately. Add water and repeat until no powder remains to receive entire dose. (Patient not taking: Reported on 3/23/2023) 45 packet 3     Current Discharge Medication List        CONTINUE these medications which have NOT CHANGED    Details   aspirin 81 MG Chew Chew 1 tablet (81 mg total) by mouth once daily.  Qty: 30 tablet, Refills: 5      atorvastatin (LIPITOR) 40 MG tablet Take 1 tablet (40 mg total) by mouth every evening.  Qty: 90 tablet, Refills: 3    Associated Diagnoses: Hyperlipidemia LDL goal <100      blood sugar diagnostic (TRUE METRIX GLUCOSE TEST STRIP) Strp TEST BLOOD SUGAR TWICE DAILY  Qty: 200 strip, Refills: 3    Associated Diagnoses: Type 2 diabetes mellitus with diabetic polyneuropathy, with long-term current use of insulin      insulin glargine (LANTUS U-100 INSULIN) 100 unit/mL injection Inject 20 Units into the skin every evening.  Qty: 18 mL, Refills: 3    Associated Diagnoses: Type 2 diabetes mellitus with diabetic polyneuropathy, with long-term current use of insulin      insulin syr/ndl U100 half yesenia (DROPLET INSULIN SYR,HALF UNIT,) 0.5 mL 30 " "gauge x 1/2" Syrg Use to administer insulin nightly  Qty: 100 each, Refills: 3    Associated Diagnoses: Type 2 diabetes mellitus with diabetic polyneuropathy, with long-term current use of insulin      lancets (TRUEPLUS LANCETS) 33 gauge Misc TEST TWO TIMES DAILY  Qty: 200 each, Refills: 3    Associated Diagnoses: Type 2 diabetes mellitus with diabetic polyneuropathy, with long-term current use of insulin      lisinopriL (PRINIVIL,ZESTRIL) 5 MG tablet Take 1 tablet (5 mg total) by mouth once daily.  Qty: 90 tablet, Refills: 3    Comments: .  Associated Diagnoses: Essential hypertension      pantoprazole (PROTONIX) 40 MG tablet Take 1 tablet (40 mg total) by mouth once daily.  Qty: 90 tablet, Refills: 3    Comments: NEW RX REQUEST FOR PATIENT DUE TO USING NEW MAIL ORDER PHARMACY -[facility] ([facility])  Associated Diagnoses: History of gastrointestinal bleeding; Dieulafoy lesion of intestine      sodium bicarbonate 650 MG tablet Take 1 tablet (650 mg total) by mouth 2 (two) times daily.  Qty: 180 tablet, Refills: 3      sodium zirconium cyclosilicate (LOKELMA) 5 gram packet Take 1 packet (5 g total) by mouth every other day. Mix entire contents of packet(s) into drinking glass containing 3 tablespoons of water; stir well and drink immediately. Add water and repeat until no powder remains to receive entire dose.  Qty: 45 packet, Refills: 3               I have seen and examined this patient within the last 30 days. My clinical findings that support the need for the home health skilled services and home bound status are the following:no   Weakness/numbness causing balance and gait disturbance due to Weakness/Debility making it taxing to leave home.  Requiring assistive device to leave home due to unsteady gait caused by  Weakness/Debility.     Diet:   regular diet    Labs:  Report Lab results to PCP.    Referrals/ Consults  Physical Therapy to evaluate and treat. Evaluate for home safety and " equipment needs; Establish/upgrade home exercise program. Perform / instruct on therapeutic exercises, gait training, transfer training, and Range of Motion.  Occupational Therapy to evaluate and treat. Evaluate home environment for safety and equipment needs. Perform/Instruct on transfers, ADL training, ROM, and therapeutic exercises.   to evaluate for community resources/long-range planning.  Aide to provide assistance with personal care, ADLs, and vital signs.    Activities:   activity as tolerated    Nursing:   Agency to admit patient within 24 hours of hospital discharge unless specified on physician order or at patient request    SN to complete comprehensive assessment including routine vital signs. Instruct on disease process and s/s of complications to report to MD. Review/verify medication list sent home with the patient at time of discharge  and instruct patient/caregiver as needed. Frequency may be adjusted depending on start of care date.     Skilled nurse to perform up to 3 visits PRN for symptoms related to diagnosis    Notify MD if SBP > 160 or < 90; DBP > 90 or < 50; HR > 120 or < 50; Temp > 101; O2 < 88%;     Ok to schedule additional visits based on staff availability and patient request on consecutive days within the home health episode.    When multiple disciplines ordered:    Start of Care occurs on Sunday - Wednesday schedule remaining discipline evaluations as ordered on separate consecutive days following the start of care.    Thursday SOC -schedule subsequent evaluations Friday and Monday the following week.     Friday - Saturday SOC - schedule subsequent discipline evaluations on consecutive days starting Monday of the following week.    For all post-discharge communication and subsequent orders please contact patient's primary care physician. If unable to reach primary care physician or do not receive response within 30 minutes, please contact PCP for clinical staff order  clarification    Miscellaneous   Routine Skin for Bedridden Patients: Instruct patient/caregiver to apply moisture barrier cream to all skin folds and wet areas in perineal area daily and after baths and all bowel movements.    Home Health Aide:  Nursing Daily, Physical Therapy Daily, and Occupational Therapy Daily             I certify that this patient is confined to her home and needs intermittent skilled nursing care, physical therapy, and occupational therapy.

## 2023-04-01 NOTE — ED NOTES
Nurse, patient daughters, security and bystanders lifted patient into car without incident. Patient alert and oriented x 4. Resp even nonlabored.

## 2023-04-11 ENCOUNTER — TELEPHONE (OUTPATIENT)
Dept: NEPHROLOGY | Facility: CLINIC | Age: 78
End: 2023-04-11
Payer: MEDICARE

## 2023-04-11 DIAGNOSIS — R31.9 HEMATURIA, UNSPECIFIED TYPE: Primary | ICD-10-CM

## 2023-04-11 RX ORDER — CEFDINIR 300 MG/1
300 CAPSULE ORAL DAILY
Qty: 7 CAPSULE | Refills: 0 | Status: SHIPPED | OUTPATIENT
Start: 2023-04-11 | End: 2023-04-11

## 2023-04-11 RX ORDER — CEFDINIR 300 MG/1
300 CAPSULE ORAL DAILY
Qty: 7 CAPSULE | Refills: 0 | Status: SHIPPED | OUTPATIENT
Start: 2023-04-11 | End: 2023-04-18

## 2023-04-11 NOTE — TELEPHONE ENCOUNTER
----- Message from Jody Murray NP sent at 4/11/2023  3:30 PM CDT -----  Regarding: RE: Rx request  Contact: [phone]  Ideally, we should get UA, culture before she starts antibiotics. Will send something in. I'll put in orders. Pls see if she can give sample PRIOR to starting (so this afternoon). She may need a urology evaluation since she is having gross hematuria (visible to eyes).  ----- Message -----  From: Nayeli Neumann MA  Sent: 4/11/2023   3:16 PM CDT  To: Jody Murray NP  Subject: FW: Rx request                                     ----- Message -----  From: Laura Weller CMA  Sent: 4/11/2023   2:48 PM CDT  To: Trevor Vera Staff  Subject: Rx request                                       Pt states she is passing blood while urinating, it started last night. Pt request antibiotics.  Please call pt to advise      [id number] - [place] - [address] [place]  [address]  [place] [address]  Phone: [phone] Fax: [phone]    Thank you

## 2023-04-11 NOTE — PROGRESS NOTES
Pt called reporting gross hematuria and requesting antibiotic for UTI.    Will order UA and culture and ask that she collect prior to starting antibiotic.    Will also notify urology team following of gross hematuria in case additional workup is needed from their standpoint.

## 2023-04-13 ENCOUNTER — HOSPITAL ENCOUNTER (OUTPATIENT)
Dept: WOUND CARE | Facility: HOSPITAL | Age: 78
Discharge: HOME | End: 2023-04-13
Attending: SURGERY
Payer: MEDICARE

## 2023-04-13 VITALS
HEART RATE: 95 BPM | BODY MASS INDEX: 42.38 KG/M2 | WEIGHT: 270 LBS | DIASTOLIC BLOOD PRESSURE: 88 MMHG | SYSTOLIC BLOOD PRESSURE: 146 MMHG | TEMPERATURE: 98 F | HEIGHT: 67 IN

## 2023-04-13 DIAGNOSIS — E11.42 TYPE 2 DIABETES MELLITUS WITH DIABETIC POLYNEUROPATHY, WITH LONG-TERM CURRENT USE OF INSULIN: ICD-10-CM

## 2023-04-13 DIAGNOSIS — I89.0 LYMPHEDEMA: ICD-10-CM

## 2023-04-13 DIAGNOSIS — I87.2 VENOUS INSUFFICIENCY OF BOTH LOWER EXTREMITIES: Primary | ICD-10-CM

## 2023-04-13 DIAGNOSIS — I83.029 VENOUS ULCER OF BOTH LOWER EXTREMITIES WITH VARICOSE VEINS: ICD-10-CM

## 2023-04-13 DIAGNOSIS — Z79.4 TYPE 2 DIABETES MELLITUS WITH DIABETIC POLYNEUROPATHY, WITH LONG-TERM CURRENT USE OF INSULIN: ICD-10-CM

## 2023-04-13 DIAGNOSIS — I83.019 VENOUS ULCER OF BOTH LOWER EXTREMITIES WITH VARICOSE VEINS: ICD-10-CM

## 2023-04-13 DIAGNOSIS — L97.919 VENOUS ULCER OF BOTH LOWER EXTREMITIES WITH VARICOSE VEINS: ICD-10-CM

## 2023-04-13 DIAGNOSIS — L97.929 VENOUS ULCER OF BOTH LOWER EXTREMITIES WITH VARICOSE VEINS: ICD-10-CM

## 2023-04-13 PROCEDURE — 29581 APPL MULTLAYER CMPRN SYS LEG: CPT | Mod: HCNC

## 2023-04-13 NOTE — PROGRESS NOTES
[facility]    Subjective:       Patient ID: Sherin Ortiz is a 78 y.o. female.    Chief Complaint: Wound Care, Venous Stasis, and Venous Ulcer    4/13/2023  Follow up with Dr Perry related to bilateral leg wounds.  Patient fell on 3/31/23, states she was sitting in her wheelchair on a slope when the wheelchair moved forward and she fell out of the wheelchair, hit her head, bruising to face, has already been seen in the ER and had xrays done.  No new injuries.  Wound improvement noted to bilateral legs by MD.  Patient to bring circaids to next visit.  Plan of care updated, orders faxed to home health.    Review of Systems   All systems were reviewed and are negative, except that mentioned in the HPI.    Objective:     Vitals:    04/13/23 1500   BP: (!) 146/88   Pulse: 95   Temp: 97.5 °F (36.4 °C)         Physical Exam  Constitutional:       Appearance: She is well-developed.   HENT:      Head: Normocephalic and atraumatic.   Eyes:      Pupils: Pupils are equal, round, and reactive to light.   Cardiovascular:      Rate and Rhythm: Normal rate.   Pulmonary:      Effort: Pulmonary effort is normal. No respiratory distress.      Breath sounds: No stridor.   Abdominal:      General: There is no distension.   Musculoskeletal:      Cervical back: Normal range of motion.   Skin:     Comments: See Synopsis for wound details   Neurological:      Mental Status: She is alert and oriented to person, place, and time.          Wound 08/18/22 1400 Other (comment) Left lower Leg (Active)   08/18/22 1400    Pre-existing: Yes   Primary Wound Type: Other   Side: Left   Orientation: lower   Location: Leg   Wound Number:    Ankle-Brachial Index:    Pulses: doppler   Removal Indication and Assessment:    Wound Outcome:    (Retired) Wound Type:    (Retired) Wound Length (cm):    (Retired) Wound Width (cm):    (Retired) Depth (cm):    Wound Description (Comments):    Removal  Indications:    Wound Image   04/13/23 1400   Dressing Appearance Intact;Dried drainage 04/13/23 1400   Drainage Amount Scant 04/13/23 1400   Drainage Characteristics/Odor Brown 04/13/23 1400   Appearance Pink;Dry;Red 04/13/23 1400   Tissue loss description Partial thickness 04/13/23 1400   Red (%), Wound Tissue Color 100 % 04/13/23 1400   Periwound Area Edematous;Satellite lesion;Dry 04/13/23 1400   Wound Edges Irregular 04/13/23 1400   Wound Length (cm) 1.1 cm 04/13/23 1400   Wound Width (cm) 0.9 cm 04/13/23 1400   Wound Depth (cm) 0.1 cm 04/13/23 1400   Wound Volume (cm^3) 0.099 cm^3 04/13/23 1400   Wound Surface Area (cm^2) 0.99 cm^2 04/13/23 1400   Care Cleansed with:;Soap and water;Sterile normal saline 04/13/23 1400   Dressing Applied;Absorptive Pad;Compression wrap 04/13/23 1400   Periwound Care Absorptive dressing applied 04/13/23 1400   Compression Two layer compression 04/13/23 1400   Dressing Change Due 04/20/23 04/13/23 1400            Wound 08/18/22 1400 Other (comment) Right lower Leg (Active)   08/18/22 1400    Pre-existing: Yes   Primary Wound Type: Other   Side: Right   Orientation: lower   Location: Leg   Wound Number:    Ankle-Brachial Index:    Pulses: doppler   Removal Indication and Assessment:    Wound Outcome:    (Retired) Wound Type:    (Retired) Wound Length (cm):    (Retired) Wound Width (cm):    (Retired) Depth (cm):    Wound Description (Comments):    Removal Indications:    Wound Image   04/13/23 1400   Dressing Appearance Intact;Dried drainage 04/13/23 1400   Drainage Amount Scant 04/13/23 1400   Drainage Characteristics/Odor Brown 04/13/23 1400   Appearance Pink;Moist;Red 04/13/23 1400   Tissue loss description Partial thickness 04/13/23 1400   Red (%), Wound Tissue Color 100 % 04/13/23 1400   Periwound Area Satellite lesion;Edematous;Dry 04/13/23 1400   Wound Edges Open 04/13/23 1400   Wound Length (cm) 0.7 cm 04/13/23 1400   Wound Width (cm) 0.6 cm 04/13/23 1400   Wound Depth (cm)  0.1 cm 04/13/23 1400   Wound Volume (cm^3) 0.042 cm^3 04/13/23 1400   Wound Surface Area (cm^2) 0.42 cm^2 04/13/23 1400   Care Cleansed with:;Soap and water;Sterile normal saline 04/13/23 1400   Dressing Applied;Non-adherent;Compression wrap 04/13/23 1400   Periwound Care Absorptive dressing applied 04/13/23 1400   Compression Two layer compression 04/13/23 1400   Dressing Change Due 04/20/23 04/13/23 1400         Assessment/Plan:         ICD-10-CM ICD-9-CM   1. Venous insufficiency of both lower extremities  I87.2 459.81   2. Lymphedema  I89.0 457.1   3. Type 2 diabetes mellitus with diabetic polyneuropathy, with long-term current use of insulin  E11.42 250.60    Z79.4 357.2     V58.67   4. Venous ulcer of both lower extremities with varicose veins  I83.019 454.0    I83.029     L97.919     L97.929            Tissue pathology and/or culture taken:  [] Yes [x] No   Sharp debridement performed:   [] Yes [x] No   Labs ordered this visit:   [] Yes [x] No   Imaging ordered this visit:   [] Yes [x] No           Orders Placed This Encounter   Procedures    Change dressing     Right and left lower leg wounds:     Cleanse wound with: Normal Saline   Lidocaine:PRN   Primary dressing: xeroform to all open areas    Edema control: Co-flex with calamine to BLE toes to knee, protect bilateral toes with small abd pad     Follow-up:  Dr. Perry 3 weeks   Home Health: [facility]: Please use supplies as per order above. Change dressings weekly and PRN except when the patient is in clinic.  Thank you.  Other orders: patient to bring circaids to next visit        Follow up in 3 weeks (on 5/4/2023) for Dr Perry.

## 2023-04-17 ENCOUNTER — PATIENT MESSAGE (OUTPATIENT)
Dept: NEPHROLOGY | Facility: CLINIC | Age: 78
End: 2023-04-17
Payer: MEDICARE

## 2023-04-27 ENCOUNTER — TELEPHONE (OUTPATIENT)
Dept: UROLOGY | Facility: CLINIC | Age: 78
End: 2023-04-27
Payer: MEDICARE

## 2023-04-28 ENCOUNTER — HOSPITAL ENCOUNTER (INPATIENT)
Facility: HOSPITAL | Age: 78
LOS: 3 days | Discharge: NURSING, REHAB OR LONG-TERM CARE | DRG: 291 | End: 2023-05-05
Attending: EMERGENCY MEDICINE | Admitting: HOSPITALIST
Payer: MEDICARE

## 2023-04-28 DIAGNOSIS — E11.42 TYPE 2 DIABETES MELLITUS WITH DIABETIC POLYNEUROPATHY, WITH LONG-TERM CURRENT USE OF INSULIN: ICD-10-CM

## 2023-04-28 DIAGNOSIS — E87.5 HYPERKALEMIA: ICD-10-CM

## 2023-04-28 DIAGNOSIS — I50.9 HEART FAILURE: ICD-10-CM

## 2023-04-28 DIAGNOSIS — R53.1 WEAKNESS: ICD-10-CM

## 2023-04-28 DIAGNOSIS — I48.0 PAROXYSMAL ATRIAL FIBRILLATION: ICD-10-CM

## 2023-04-28 DIAGNOSIS — R06.02 SHORTNESS OF BREATH: ICD-10-CM

## 2023-04-28 DIAGNOSIS — J90 PLEURAL EFFUSION: ICD-10-CM

## 2023-04-28 DIAGNOSIS — I73.9 PAD (PERIPHERAL ARTERY DISEASE): ICD-10-CM

## 2023-04-28 DIAGNOSIS — J81.0 ACUTE PULMONARY EDEMA: ICD-10-CM

## 2023-04-28 DIAGNOSIS — Z79.4 TYPE 2 DIABETES MELLITUS WITH DIABETIC POLYNEUROPATHY, WITH LONG-TERM CURRENT USE OF INSULIN: ICD-10-CM

## 2023-04-28 DIAGNOSIS — N17.9 AKI (ACUTE KIDNEY INJURY): Primary | ICD-10-CM

## 2023-04-28 PROBLEM — E11.9 TYPE 2 DIABETES MELLITUS, WITH LONG-TERM CURRENT USE OF INSULIN: Status: ACTIVE | Noted: 2021-01-14

## 2023-04-28 PROBLEM — I50.33 ACUTE ON CHRONIC DIASTOLIC HEART FAILURE: Status: ACTIVE | Noted: 2023-04-28

## 2023-04-28 PROBLEM — I48.91 ATRIAL FIBRILLATION: Status: ACTIVE | Noted: 2023-04-28

## 2023-04-28 LAB
ALBUMIN SERPL BCP-MCNC: 2.8 G/DL (ref 3.5–5.2)
ALP SERPL-CCNC: 218 U/L (ref 55–135)
ALT SERPL W/O P-5'-P-CCNC: 13 U/L (ref 10–44)
ANION GAP SERPL CALC-SCNC: 10 MMOL/L (ref 8–16)
AST SERPL-CCNC: 17 U/L (ref 10–40)
BACTERIA #/AREA URNS AUTO: ABNORMAL /HPF
BASOPHILS # BLD AUTO: 0.01 K/UL (ref 0–0.2)
BASOPHILS NFR BLD: 0.4 % (ref 0–1.9)
BILIRUB SERPL-MCNC: 0.4 MG/DL (ref 0.1–1)
BILIRUB UR QL STRIP: NEGATIVE
BNP SERPL-MCNC: 361 PG/ML (ref 0–99)
BUN SERPL-MCNC: 28 MG/DL (ref 6–30)
BUN SERPL-MCNC: 28 MG/DL (ref 8–23)
CALCIUM SERPL-MCNC: 9.3 MG/DL (ref 8.7–10.5)
CHLORIDE SERPL-SCNC: 109 MMOL/L (ref 95–110)
CHLORIDE SERPL-SCNC: 109 MMOL/L (ref 95–110)
CLARITY UR REFRACT.AUTO: CLEAR
CO2 SERPL-SCNC: 22 MMOL/L (ref 23–29)
COLOR UR AUTO: ABNORMAL
CREAT SERPL-MCNC: 2.2 MG/DL (ref 0.5–1.4)
CREAT SERPL-MCNC: 2.3 MG/DL (ref 0.5–1.4)
DIFFERENTIAL METHOD: ABNORMAL
EOSINOPHIL # BLD AUTO: 0 K/UL (ref 0–0.5)
EOSINOPHIL NFR BLD: 1.1 % (ref 0–8)
ERYTHROCYTE [DISTWIDTH] IN BLOOD BY AUTOMATED COUNT: 16.2 % (ref 11.5–14.5)
EST. GFR  (NO RACE VARIABLE): 22.1 ML/MIN/1.73 M^2
GLUCOSE SERPL-MCNC: 237 MG/DL (ref 70–110)
GLUCOSE SERPL-MCNC: 247 MG/DL (ref 70–110)
GLUCOSE UR QL STRIP: ABNORMAL
HCT VFR BLD AUTO: 32.5 % (ref 37–48.5)
HCT VFR BLD CALC: 34 %PCV (ref 36–54)
HGB BLD-MCNC: 9.8 G/DL (ref 12–16)
HGB UR QL STRIP: ABNORMAL
HYALINE CASTS UR QL AUTO: 0 /LPF
IMM GRANULOCYTES # BLD AUTO: 0.01 K/UL (ref 0–0.04)
IMM GRANULOCYTES NFR BLD AUTO: 0.4 % (ref 0–0.5)
KETONES UR QL STRIP: NEGATIVE
LEUKOCYTE ESTERASE UR QL STRIP: NEGATIVE
LIPASE SERPL-CCNC: 29 U/L (ref 4–60)
LYMPHOCYTES # BLD AUTO: 0.7 K/UL (ref 1–4.8)
LYMPHOCYTES NFR BLD: 26.3 % (ref 18–48)
MAGNESIUM SERPL-MCNC: 1.9 MG/DL (ref 1.6–2.6)
MCH RBC QN AUTO: 24.7 PG (ref 27–31)
MCHC RBC AUTO-ENTMCNC: 30.2 G/DL (ref 32–36)
MCV RBC AUTO: 82 FL (ref 82–98)
MICROSCOPIC COMMENT: ABNORMAL
MONOCYTES # BLD AUTO: 0.3 K/UL (ref 0.3–1)
MONOCYTES NFR BLD: 9.3 % (ref 4–15)
NEUTROPHILS # BLD AUTO: 1.8 K/UL (ref 1.8–7.7)
NEUTROPHILS NFR BLD: 62.5 % (ref 38–73)
NITRITE UR QL STRIP: NEGATIVE
NRBC BLD-RTO: 0 /100 WBC
PH UR STRIP: 7 [PH] (ref 5–8)
PLATELET # BLD AUTO: 155 K/UL (ref 150–450)
PMV BLD AUTO: 9.3 FL (ref 9.2–12.9)
POC IONIZED CALCIUM: 1.23 MMOL/L (ref 1.06–1.42)
POC TCO2 (MEASURED): 23 MMOL/L (ref 23–29)
POCT GLUCOSE: 227 MG/DL (ref 70–110)
POTASSIUM BLD-SCNC: 5.2 MMOL/L (ref 3.5–5.1)
POTASSIUM SERPL-SCNC: 5.3 MMOL/L (ref 3.5–5.1)
PROT SERPL-MCNC: 7.2 G/DL (ref 6–8.4)
PROT UR QL STRIP: ABNORMAL
RBC # BLD AUTO: 3.96 M/UL (ref 4–5.4)
RBC #/AREA URNS AUTO: 13 /HPF (ref 0–4)
SAMPLE: ABNORMAL
SODIUM BLD-SCNC: 140 MMOL/L (ref 136–145)
SODIUM SERPL-SCNC: 141 MMOL/L (ref 136–145)
SP GR UR STRIP: 1.01 (ref 1–1.03)
SQUAMOUS #/AREA URNS AUTO: 0 /HPF
TROPONIN I SERPL DL<=0.01 NG/ML-MCNC: 0.01 NG/ML (ref 0–0.03)
TSH SERPL DL<=0.005 MIU/L-ACNC: 3.41 UIU/ML (ref 0.4–4)
URN SPEC COLLECT METH UR: ABNORMAL
WBC # BLD AUTO: 2.81 K/UL (ref 3.9–12.7)
WBC #/AREA URNS AUTO: 2 /HPF (ref 0–5)
YEAST UR QL AUTO: ABNORMAL

## 2023-04-28 PROCEDURE — 82962 GLUCOSE BLOOD TEST: CPT

## 2023-04-28 PROCEDURE — 96372 THER/PROPH/DIAG INJ SC/IM: CPT | Performed by: HOSPITALIST

## 2023-04-28 PROCEDURE — 83880 ASSAY OF NATRIURETIC PEPTIDE: CPT | Mod: HCNC | Performed by: EMERGENCY MEDICINE

## 2023-04-28 PROCEDURE — 83735 ASSAY OF MAGNESIUM: CPT | Mod: HCNC | Performed by: EMERGENCY MEDICINE

## 2023-04-28 PROCEDURE — 80047 BASIC METABLC PNL IONIZED CA: CPT

## 2023-04-28 PROCEDURE — 84484 ASSAY OF TROPONIN QUANT: CPT | Mod: HCNC | Performed by: EMERGENCY MEDICINE

## 2023-04-28 PROCEDURE — 93010 ELECTROCARDIOGRAM REPORT: CPT | Mod: ,,, | Performed by: INTERNAL MEDICINE

## 2023-04-28 PROCEDURE — 93010 EKG 12-LEAD: ICD-10-PCS | Mod: ,,, | Performed by: INTERNAL MEDICINE

## 2023-04-28 PROCEDURE — 99285 EMERGENCY DEPT VISIT HI MDM: CPT | Mod: ,,,

## 2023-04-28 PROCEDURE — 99285 EMERGENCY DEPT VISIT HI MDM: CPT | Mod: 25

## 2023-04-28 PROCEDURE — G0378 HOSPITAL OBSERVATION PER HR: HCPCS | Mod: HCNC

## 2023-04-28 PROCEDURE — 99223 1ST HOSP IP/OBS HIGH 75: CPT | Mod: AI,,, | Performed by: HOSPITALIST

## 2023-04-28 PROCEDURE — 83690 ASSAY OF LIPASE: CPT | Mod: HCNC | Performed by: EMERGENCY MEDICINE

## 2023-04-28 PROCEDURE — 25000003 PHARM REV CODE 250: Mod: HCNC

## 2023-04-28 PROCEDURE — 93005 ELECTROCARDIOGRAM TRACING: CPT | Mod: HCNC

## 2023-04-28 PROCEDURE — 99285 PR EMERGENCY DEPT VISIT,LEVEL V: ICD-10-PCS | Mod: ,,,

## 2023-04-28 PROCEDURE — 96375 TX/PRO/DX INJ NEW DRUG ADDON: CPT

## 2023-04-28 PROCEDURE — 85025 COMPLETE CBC W/AUTO DIFF WBC: CPT | Mod: HCNC | Performed by: EMERGENCY MEDICINE

## 2023-04-28 PROCEDURE — 80053 COMPREHEN METABOLIC PANEL: CPT | Mod: HCNC | Performed by: EMERGENCY MEDICINE

## 2023-04-28 PROCEDURE — 25000003 PHARM REV CODE 250: Mod: HCNC | Performed by: HOSPITALIST

## 2023-04-28 PROCEDURE — 81001 URINALYSIS AUTO W/SCOPE: CPT | Mod: HCNC | Performed by: EMERGENCY MEDICINE

## 2023-04-28 PROCEDURE — 84443 ASSAY THYROID STIM HORMONE: CPT | Mod: HCNC | Performed by: EMERGENCY MEDICINE

## 2023-04-28 PROCEDURE — 99223 PR INITIAL HOSPITAL CARE,LEVL III: ICD-10-PCS | Mod: AI,,, | Performed by: HOSPITALIST

## 2023-04-28 PROCEDURE — 94761 N-INVAS EAR/PLS OXIMETRY MLT: CPT | Mod: HCNC

## 2023-04-28 PROCEDURE — 63600175 PHARM REV CODE 636 W HCPCS: Mod: HCNC

## 2023-04-28 RX ORDER — FUROSEMIDE 10 MG/ML
80 INJECTION INTRAMUSCULAR; INTRAVENOUS 2 TIMES DAILY
Status: DISCONTINUED | OUTPATIENT
Start: 2023-04-29 | End: 2023-04-29

## 2023-04-28 RX ORDER — FUROSEMIDE 10 MG/ML
80 INJECTION INTRAMUSCULAR; INTRAVENOUS
Status: COMPLETED | OUTPATIENT
Start: 2023-04-28 | End: 2023-04-28

## 2023-04-28 RX ORDER — PANTOPRAZOLE SODIUM 40 MG/1
40 TABLET, DELAYED RELEASE ORAL DAILY
Status: DISCONTINUED | OUTPATIENT
Start: 2023-04-29 | End: 2023-05-05 | Stop reason: HOSPADM

## 2023-04-28 RX ORDER — CALCIUM GLUCONATE 20 MG/ML
1 INJECTION, SOLUTION INTRAVENOUS
Status: DISCONTINUED | OUTPATIENT
Start: 2023-04-28 | End: 2023-04-28

## 2023-04-28 RX ORDER — ATORVASTATIN CALCIUM 40 MG/1
40 TABLET, FILM COATED ORAL NIGHTLY
Status: DISCONTINUED | OUTPATIENT
Start: 2023-04-28 | End: 2023-05-05 | Stop reason: HOSPADM

## 2023-04-28 RX ORDER — NAPROXEN SODIUM 220 MG/1
81 TABLET, FILM COATED ORAL DAILY
Status: DISCONTINUED | OUTPATIENT
Start: 2023-04-29 | End: 2023-05-05 | Stop reason: HOSPADM

## 2023-04-28 RX ORDER — HYDRALAZINE HYDROCHLORIDE 25 MG/1
25 TABLET, FILM COATED ORAL
Status: COMPLETED | OUTPATIENT
Start: 2023-04-28 | End: 2023-04-28

## 2023-04-28 RX ORDER — SODIUM BICARBONATE 650 MG/1
650 TABLET ORAL 2 TIMES DAILY
Status: DISCONTINUED | OUTPATIENT
Start: 2023-04-28 | End: 2023-05-05 | Stop reason: HOSPADM

## 2023-04-28 RX ORDER — SODIUM CHLORIDE 0.9 % (FLUSH) 0.9 %
10 SYRINGE (ML) INJECTION
Status: DISCONTINUED | OUTPATIENT
Start: 2023-04-28 | End: 2023-05-05 | Stop reason: HOSPADM

## 2023-04-28 RX ORDER — AMLODIPINE BESYLATE 10 MG/1
10 TABLET ORAL DAILY
Status: DISCONTINUED | OUTPATIENT
Start: 2023-04-28 | End: 2023-05-05 | Stop reason: HOSPADM

## 2023-04-28 RX ADMIN — FUROSEMIDE 80 MG: 10 INJECTION, SOLUTION INTRAMUSCULAR; INTRAVENOUS at 07:04

## 2023-04-28 RX ADMIN — HYDRALAZINE HYDROCHLORIDE 25 MG: 25 TABLET, FILM COATED ORAL at 08:04

## 2023-04-28 RX ADMIN — INSULIN DETEMIR 8 UNITS: 100 INJECTION, SOLUTION SUBCUTANEOUS at 11:04

## 2023-04-28 RX ADMIN — SODIUM BICARBONATE 650 MG: 650 TABLET ORAL at 11:04

## 2023-04-28 RX ADMIN — AMLODIPINE BESYLATE 10 MG: 10 TABLET ORAL at 10:04

## 2023-04-28 RX ADMIN — ATORVASTATIN CALCIUM 40 MG: 40 TABLET, FILM COATED ORAL at 11:04

## 2023-04-28 NOTE — ED PROVIDER NOTES
Encounter Date: 4/28/2023       History     Chief Complaint   Patient presents with    Weakness     Arrived via ems from home with c/o generalized weakness x5 days, also c/o abdominal discomfort     78-year-old female history of hyperlipidemia, hypertension, CKD and type 2 diabetes presents to the emergency department with complaints of shortness of breath and weakness.  She also endorses a  global sensation in her throat.  She states the symptoms have began over the last week.  She is having difficulty sleeping due to orthopnea and has had to sit up to help alleviate symptoms.  Patient uses a electric wheelchair to ambulate however at baseline states she is able to stand up.  Today she reports feeling very unsteady and weak when attempting to ambulate, she also states she feels her abdomen is larger than normal.  Patient most recently completedabx for urinary tract infection, however culture did not display growth.  She denies fever chills nausea vomiting cough.     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    Benign essential hypertension 11/14/2012    Cataract     s/p phacoemulsification    Chronic kidney disease (CKD), stage III (moderate) 9/12/2013    Diabetic peripheral neuropathy associated with type 2 diabetes mellitus 11/14/2014    causing right hemiparesis    Gait disorder     Hyperlipidemia     Iritis - Both Eyes 6/10/2013    Kidney stone     Lymphedema     Morbid obesity with BMI of 40.0-44.9, adult 2/18/2015    Nephrolithiasis 4/20/2016    NS (nuclear sclerosis) 4/1/2013    Nuclear  sclerosis - Both Eyes 4/29/2013    Preseptal cellulitis - Right Eye 4/29/2013    Proliferative diabetic retinopathy - Both Eyes 4/29/2013    Proliferative diabetic retinopathy, both eyes 4/1/2013    PSC (posterior subcapsular cataract) - Both Eyes 4/29/2013    S/P hernia repair 12/19/2012    TIA (transient ischemic attack) 11/18/2014    Tinea pedis 7/24/2012    Tinea pedis is present on both feet.     Type 2 diabetes mellitus with diabetic polyneuropathy, with long-term current use of insulin 9/18/2015    Type 2 diabetes mellitus with renal manifestations, controlled 12/12/2013    Type 2 diabetes, controlled, with moderate nonproliferative diabetic retinopathy without macular edema 9/17/2015    Ulcer of left lower extremity, limited to breakdown of skin 7/8/2015    Unspecified cerebral artery occlusion with cerebral infarction 11/16/2014    Unspecified venous (peripheral) insufficiency     Ureteral stone with hydronephrosis 1/27/2016    UTI (lower urinary tract infection)     Vaginal infection     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     Past Surgical History:   Procedure Laterality Date    ABLATION Bilateral 6/23/2022    Procedure: Ablation;  Surgeon: Vahid Farfan MD;  Location: [facility] CATH LAB/EP;  Service: Cardiology;  Laterality: Bilateral;    ABLATION Right 11/17/2022    Procedure: Ablation;  Surgeon: Vahid Farfan MD;  Location: [facility] CATH LAB/EP;  Service: Cardiology;  Laterality: Right;    APPENDECTOMY      CATARACT EXTRACTION W/  INTRAOCULAR LENS IMPLANT Left 5/21/2013    CATARACT EXTRACTION W/  INTRAOCULAR LENS IMPLANT Right 6/4/2013    CHOLECYSTECTOMY      COLONOSCOPY  12/22/2005    normal    COLONOSCOPY N/A 7/14/2022    Procedure: COLONOSCOPY;  Surgeon: Kannan Mace MD;  Location: [facility] ENDO;  Service: Endoscopy;  Laterality: N/A;    ESOPHAGOGASTRODUODENOSCOPY  12/21/2015    hiatal hernia, Schatzki ring    ESOPHAGOGASTRODUODENOSCOPY N/A 7/13/2022    Procedure: EGD (ESOPHAGOGASTRODUODENOSCOPY);   Surgeon: Kannan Mace MD;  Location: [facility] ENDO;  Service: Endoscopy;  Laterality: N/A;    EYE SURGERY Bilateral     laser surgery both eyes    INTRALUMINAL GASTROINTESTINAL TRACT IMAGING VIA CAPSULE N/A 7/15/2022    Procedure: IMAGING PROCEDURE, GI TRACT, INTRALUMINAL, VIA CAPSULE;  Surgeon: Kannan Mace MD;  Location: [facility] ENDO;  Service: Endoscopy;  Laterality: N/A;    NASAL SEPTUM SURGERY      SMALL BOWEL ENTEROSCOPY N/A 2022    Procedure: ENTEROSCOPY Upper SBE;  Surgeon: Salo Frank MD;  Location: [facility] ENDO;  Service: Endoscopy;  Laterality: N/A;    SUBTOTAL COLECTOMY  2012    transverse colon, for incarcerated umbilical hernia, Dr. Kat Bower     Family History   Problem Relation Age of Onset    Diabetes Sister     Cataracts Sister     Heart disease Brother     Cataracts Brother     Leukemia Mother     Cancer Neg Hx     Amblyopia Neg Hx     Blindness Neg Hx     Glaucoma Neg Hx     Hypertension Neg Hx     Macular degeneration Neg Hx     Retinal detachment Neg Hx     Strabismus Neg Hx     Stroke Neg Hx     Thyroid disease Neg Hx     Kidney disease Neg Hx      Social History     Tobacco Use    Smoking status: Former     Packs/day: 0.50     Years: 15.00     Pack years: 7.50     Types: Cigarettes     Quit date: 1982     Years since quittin.8    Smokeless tobacco: Former    Tobacco comments:     smoked one pack per week   Substance Use Topics    Alcohol use: No    Drug use: No     Review of Systems   Constitutional:  Negative for fever.   HENT:  Positive for sore throat.    Respiratory:  Positive for shortness of breath. Negative for cough, wheezing and stridor.    Cardiovascular:  Positive for leg swelling. Negative for chest pain.   Gastrointestinal:  Negative for abdominal pain, diarrhea, nausea and vomiting.   Genitourinary:  Negative for dysuria.   Musculoskeletal:  Negative for myalgias.   Allergic/Immunologic: Negative for immunocompromised state.   Neurological:  " Negative for dizziness.     Physical Exam     Initial Vitals [04/28/23 1531]   BP Pulse Resp Temp SpO2   (!) 190/60 60 (!) 22 97.4 °F (36.3 °C) 99 %      MAP       --         Physical Exam    Constitutional:   Patient appears volume overload.  Not hypoxic however tachypneic   HENT:   Head: Normocephalic and atraumatic.   Eyes: Conjunctivae and EOM are normal.   Neck:   Normal range of motion.  Cardiovascular:  Normal rate and regular rhythm.           Pulmonary/Chest: No respiratory distress. She has no wheezes.   Abdominal: Abdomen is soft. She exhibits no distension. There is no abdominal tenderness. There is no rebound.   Musculoskeletal:         General: Edema present.      Cervical back: Normal range of motion.      Comments: Patient unable to ambulate independently, needs assistance  Strength in upper and lower extremities equal  Patient has bandages on lower extremities bilaterally due to chronic wounds they are being managed by wound care.     Neurological: She is alert.   Skin: Skin is warm and dry.   Psychiatric: She has a normal mood and affect. Thought content normal.       ED Course   Procedures  Labs Reviewed   CBC W/ AUTO DIFFERENTIAL   COMPREHENSIVE METABOLIC PANEL   LIPASE   URINALYSIS, REFLEX TO URINE CULTURE   B-TYPE NATRIURETIC PEPTIDE   MAGNESIUM   TROPONIN I   TSH   ISTAT CHEM8          Imaging Results    None          Medications - No data to display  Medical Decision Making:   Differential Diagnosis:   COPD, pneumonia, pulmonary edema, pulmonary infusion, worsening renal failure  ED Management:  78-year-old female presents emergency department with main complaint of shortness of breath she has known renal disease.  Labs and imaging performed.  Patient found to have left pleural effusion with right pulmonary edema.  She also has slight hyperkalemia with LOREN above her baseline.  She has not required O2 while in emergency department.  Patient does report intermittent "stuck in throat" sensation " however physical exam is unremarkable soft tissue neck did not reveal abnormalities.  I discussed patient presentation with hospital medicine who will place in observation.  Patient has been hypertensive in the emergency department denies headaches or chest pain.  Pt discussed with supervising physician                          Clinical Impression:   Final diagnoses:  [R53.1] Weakness  [R06.02] Shortness of breath               Alta Mancilla PA-C  04/29/23 1031

## 2023-04-28 NOTE — PROVIDER PROGRESS NOTES - EMERGENCY DEPT.
Encounter Date: 4/28/2023    ED Physician Progress Notes         EKG - STEMI Decision  Initial Reading: No STEMI present.  Response: patient moved to monitored bed.    Atrial fibrillation, heart rate 61, no criteria for STEMI.  ECG very similar to previous ECG.    Shreyas Elizabeth DO, SANAM  Emergency Staff Physician   Dept of Emergency Medicine   [facility]  Spectralink: [id number]        Disclaimer: This note has been generated using voice-recognition software. There may be typographical errors that have been missed during proof-reading.

## 2023-04-28 NOTE — Clinical Note
Diagnosis: LOREN (acute kidney injury) [[id number]]   Future Attending Provider: DARLENE NUNEZ [[id number]]   Admitting Provider:: DARLENE NUNEZ [[id number]]

## 2023-04-29 LAB
ANION GAP SERPL CALC-SCNC: 7 MMOL/L (ref 8–16)
ASCENDING AORTA: 3.16 CM
AV INDEX (PROSTH): 0.78
AV MEAN GRADIENT: 3 MMHG
AV PEAK GRADIENT: 7 MMHG
AV VALVE AREA: 2.34 CM2
AV VELOCITY RATIO: 0.59
BASOPHILS # BLD AUTO: 0.01 K/UL (ref 0–0.2)
BASOPHILS NFR BLD: 0.3 % (ref 0–1.9)
BSA FOR ECHO PROCEDURE: 2.41 M2
BUN SERPL-MCNC: 30 MG/DL (ref 8–23)
CALCIUM SERPL-MCNC: 9.2 MG/DL (ref 8.7–10.5)
CHLORIDE SERPL-SCNC: 109 MMOL/L (ref 95–110)
CO2 SERPL-SCNC: 25 MMOL/L (ref 23–29)
CREAT SERPL-MCNC: 2.2 MG/DL (ref 0.5–1.4)
CV ECHO LV RWT: 0.45 CM
DIFFERENTIAL METHOD: ABNORMAL
DOP CALC AO PEAK VEL: 1.36 M/S
DOP CALC AO VTI: 26.8 CM
DOP CALC LVOT AREA: 3 CM2
DOP CALC LVOT DIAMETER: 1.96 CM
DOP CALC LVOT PEAK VEL: 0.8 M/S
DOP CALC LVOT STROKE VOLUME: 62.67 CM3
DOP CALCLVOT PEAK VEL VTI: 20.78 CM
E WAVE DECELERATION TIME: 242.72 MSEC
E/A RATIO: 2.63
E/E' RATIO: 11.37 M/S
ECHO LV POSTERIOR WALL: 1.05 CM (ref 0.6–1.1)
EJECTION FRACTION: 60 %
EOSINOPHIL # BLD AUTO: 0.1 K/UL (ref 0–0.5)
EOSINOPHIL NFR BLD: 1.3 % (ref 0–8)
ERYTHROCYTE [DISTWIDTH] IN BLOOD BY AUTOMATED COUNT: 16.4 % (ref 11.5–14.5)
EST. GFR  (NO RACE VARIABLE): 22.1 ML/MIN/1.73 M^2
FRACTIONAL SHORTENING: 42 % (ref 28–44)
GLUCOSE SERPL-MCNC: 151 MG/DL (ref 70–110)
GLUCOSE SERPL-MCNC: 167 MG/DL (ref 70–110)
GLUCOSE SERPL-MCNC: 245 MG/DL (ref 70–110)
HCT VFR BLD AUTO: 32 % (ref 37–48.5)
HGB BLD-MCNC: 9.5 G/DL (ref 12–16)
IMM GRANULOCYTES # BLD AUTO: 0.01 K/UL (ref 0–0.04)
IMM GRANULOCYTES NFR BLD AUTO: 0.3 % (ref 0–0.5)
INTERVENTRICULAR SEPTUM: 1.1 CM (ref 0.6–1.1)
IVRT: 111.32 MSEC
LA MAJOR: 6.14 CM
LA MINOR: 6.76 CM
LA WIDTH: 4.75 CM
LEFT ATRIUM SIZE: 4.88 CM
LEFT ATRIUM VOLUME INDEX MOD: 29.1 ML/M2
LEFT ATRIUM VOLUME INDEX: 55.1 ML/M2
LEFT ATRIUM VOLUME MOD: 66.82 CM3
LEFT ATRIUM VOLUME: 126.79 CM3
LEFT INTERNAL DIMENSION IN SYSTOLE: 2.71 CM (ref 2.1–4)
LEFT VENTRICLE DIASTOLIC VOLUME INDEX: 38.73 ML/M2
LEFT VENTRICLE DIASTOLIC VOLUME: 89.09 ML
LEFT VENTRICLE MASS INDEX: 79 G/M2
LEFT VENTRICLE SYSTOLIC VOLUME INDEX: 11.9 ML/M2
LEFT VENTRICLE SYSTOLIC VOLUME: 27.32 ML
LEFT VENTRICULAR INTERNAL DIMENSION IN DIASTOLE: 4.7 CM (ref 3.5–6)
LEFT VENTRICULAR MASS: 181.64 G
LV LATERAL E/E' RATIO: 9 M/S
LV SEPTAL E/E' RATIO: 15.43 M/S
LYMPHOCYTES # BLD AUTO: 1 K/UL (ref 1–4.8)
LYMPHOCYTES NFR BLD: 26.8 % (ref 18–48)
MCH RBC QN AUTO: 24.4 PG (ref 27–31)
MCHC RBC AUTO-ENTMCNC: 29.7 G/DL (ref 32–36)
MCV RBC AUTO: 82 FL (ref 82–98)
MONOCYTES # BLD AUTO: 0.4 K/UL (ref 0.3–1)
MONOCYTES NFR BLD: 9.6 % (ref 4–15)
MV PEAK A VEL: 0.41 M/S
MV PEAK E VEL: 1.08 M/S
MV STENOSIS PRESSURE HALF TIME: 70.39 MS
MV VALVE AREA P 1/2 METHOD: 3.13 CM2
NEUTROPHILS # BLD AUTO: 2.4 K/UL (ref 1.8–7.7)
NEUTROPHILS NFR BLD: 61.7 % (ref 38–73)
NRBC BLD-RTO: 0 /100 WBC
PISA TR MAX VEL: 2.8 M/S
PLATELET # BLD AUTO: 167 K/UL (ref 150–450)
PMV BLD AUTO: 9.3 FL (ref 9.2–12.9)
POCT GLUCOSE: 151 MG/DL (ref 70–110)
POCT GLUCOSE: 224 MG/DL (ref 70–110)
POCT GLUCOSE: 245 MG/DL (ref 70–110)
POCT GLUCOSE: 258 MG/DL (ref 70–110)
POTASSIUM SERPL-SCNC: 4.6 MMOL/L (ref 3.5–5.1)
RA MAJOR: 5.33 CM
RA PRESSURE: 8 MMHG
RA WIDTH: 4.9 CM
RBC # BLD AUTO: 3.9 M/UL (ref 4–5.4)
RIGHT ATRIAL AREA: 23 CM2
RIGHT VENTRICULAR END-DIASTOLIC DIMENSION: 3.53 CM
RV TISSUE DOPPLER FREE WALL SYSTOLIC VELOCITY 1 (APICAL 4 CHAMBER VIEW): 10.78 CM/S
SINUS: 2.77 CM
SODIUM SERPL-SCNC: 141 MMOL/L (ref 136–145)
STJ: 2.46 CM
TDI LATERAL: 0.12 M/S
TDI SEPTAL: 0.07 M/S
TDI: 0.1 M/S
TR MAX PG: 31 MMHG
TRICUSPID ANNULAR PLANE SYSTOLIC EXCURSION: 1.97 CM
TV REST PULMONARY ARTERY PRESSURE: 39 MMHG
WBC # BLD AUTO: 3.85 K/UL (ref 3.9–12.7)

## 2023-04-29 PROCEDURE — 97530 THERAPEUTIC ACTIVITIES: CPT | Mod: HCNC

## 2023-04-29 PROCEDURE — 99232 SBSQ HOSP IP/OBS MODERATE 35: CPT | Mod: HCNC,,, | Performed by: STUDENT IN AN ORGANIZED HEALTH CARE EDUCATION/TRAINING PROGRAM

## 2023-04-29 PROCEDURE — 97161 PT EVAL LOW COMPLEX 20 MIN: CPT | Mod: HCNC

## 2023-04-29 PROCEDURE — G0378 HOSPITAL OBSERVATION PER HR: HCPCS | Mod: HCNC

## 2023-04-29 PROCEDURE — 25000003 PHARM REV CODE 250: Mod: HCNC | Performed by: HOSPITALIST

## 2023-04-29 PROCEDURE — 63600175 PHARM REV CODE 636 W HCPCS: Mod: HCNC | Performed by: HOSPITALIST

## 2023-04-29 PROCEDURE — 99232 PR SUBSEQUENT HOSPITAL CARE,LEVL II: ICD-10-PCS | Mod: HCNC,,, | Performed by: STUDENT IN AN ORGANIZED HEALTH CARE EDUCATION/TRAINING PROGRAM

## 2023-04-29 PROCEDURE — 96368 THER/DIAG CONCURRENT INF: CPT

## 2023-04-29 PROCEDURE — 80048 BASIC METABOLIC PNL TOTAL CA: CPT | Mod: HCNC | Performed by: HOSPITALIST

## 2023-04-29 PROCEDURE — 96366 THER/PROPH/DIAG IV INF ADDON: CPT

## 2023-04-29 PROCEDURE — 99223 1ST HOSP IP/OBS HIGH 75: CPT | Mod: 25,HCNC,GC, | Performed by: INTERNAL MEDICINE

## 2023-04-29 PROCEDURE — 82962 GLUCOSE BLOOD TEST: CPT

## 2023-04-29 PROCEDURE — 96376 TX/PRO/DX INJ SAME DRUG ADON: CPT

## 2023-04-29 PROCEDURE — 87040 BLOOD CULTURE FOR BACTERIA: CPT | Mod: HCNC | Performed by: STUDENT IN AN ORGANIZED HEALTH CARE EDUCATION/TRAINING PROGRAM

## 2023-04-29 PROCEDURE — 96365 THER/PROPH/DIAG IV INF INIT: CPT

## 2023-04-29 PROCEDURE — 97112 NEUROMUSCULAR REEDUCATION: CPT | Mod: HCNC

## 2023-04-29 PROCEDURE — 97165 OT EVAL LOW COMPLEX 30 MIN: CPT | Mod: HCNC

## 2023-04-29 PROCEDURE — 99223 PR INITIAL HOSPITAL CARE,LEVL III: ICD-10-PCS | Mod: 25,HCNC,GC, | Performed by: INTERNAL MEDICINE

## 2023-04-29 PROCEDURE — 85025 COMPLETE CBC W/AUTO DIFF WBC: CPT | Mod: HCNC | Performed by: HOSPITALIST

## 2023-04-29 PROCEDURE — 96372 THER/PROPH/DIAG INJ SC/IM: CPT | Performed by: HOSPITALIST

## 2023-04-29 RX ORDER — LEVOFLOXACIN 5 MG/ML
750 INJECTION, SOLUTION INTRAVENOUS
Status: DISCONTINUED | OUTPATIENT
Start: 2023-04-29 | End: 2023-04-29

## 2023-04-29 RX ORDER — LOPERAMIDE HYDROCHLORIDE 2 MG/1
2 CAPSULE ORAL 4 TIMES DAILY PRN
Status: DISCONTINUED | OUTPATIENT
Start: 2023-04-29 | End: 2023-05-05 | Stop reason: HOSPADM

## 2023-04-29 RX ORDER — HYDROXYZINE HYDROCHLORIDE 25 MG/1
25 TABLET, FILM COATED ORAL ONCE
Status: COMPLETED | OUTPATIENT
Start: 2023-04-29 | End: 2023-04-29

## 2023-04-29 RX ORDER — CEFEPIME HYDROCHLORIDE 2 G/50ML
2 INJECTION, SOLUTION INTRAVENOUS
Status: DISCONTINUED | OUTPATIENT
Start: 2023-04-29 | End: 2023-04-29

## 2023-04-29 RX ADMIN — AMLODIPINE BESYLATE 10 MG: 10 TABLET ORAL at 08:04

## 2023-04-29 RX ADMIN — INSULIN DETEMIR 8 UNITS: 100 INJECTION, SOLUTION SUBCUTANEOUS at 08:04

## 2023-04-29 RX ADMIN — SODIUM BICARBONATE 650 MG: 650 TABLET ORAL at 08:04

## 2023-04-29 RX ADMIN — FUROSEMIDE 80 MG: 10 INJECTION, SOLUTION INTRAMUSCULAR; INTRAVENOUS at 08:04

## 2023-04-29 RX ADMIN — HYDROXYZINE HYDROCHLORIDE 25 MG: 25 TABLET, FILM COATED ORAL at 10:04

## 2023-04-29 RX ADMIN — ASPIRIN 81 MG CHEWABLE TABLET 81 MG: 81 TABLET CHEWABLE at 08:04

## 2023-04-29 RX ADMIN — VANCOMYCIN HYDROCHLORIDE 2000 MG: 500 INJECTION, POWDER, LYOPHILIZED, FOR SOLUTION INTRAVENOUS at 07:04

## 2023-04-29 RX ADMIN — CEFEPIME 2 G: 2 INJECTION, POWDER, FOR SOLUTION INTRAVENOUS at 07:04

## 2023-04-29 RX ADMIN — PANTOPRAZOLE SODIUM 40 MG: 40 TABLET, DELAYED RELEASE ORAL at 08:04

## 2023-04-29 RX ADMIN — ATORVASTATIN CALCIUM 40 MG: 40 TABLET, FILM COATED ORAL at 08:04

## 2023-04-29 NOTE — ASSESSMENT & PLAN NOTE
Stage C: Structural heart disease with prior or current symptoms of HF.  Non ischemic cardiomyopathy     Lab Results   Component Value Date     (H) 04/28/2023     (H) 07/12/2022     (H) 11/21/2020     (H) 01/21/2018     (H) 01/06/2018      EF   Date Value Ref Range Status   04/29/2023 60 % Final     Assessment and Recs:  - exam and labs consistent with acute decompensated heart failure, no current evidence of shock   - can hold further diuresis for now given adequate UOP, will reassess in AM   - formal TTE below   - Fluid restriction at 1.5L/day, strict I/Os and daily standing weights, maintain on telemetry and daily EKGs, maintain Mag >2 & K+ >4  - SCDs, TEDs, Nursing communication to elevated LE, ambulate as tolerated      Results for orders placed during the hospital encounter of 04/28/23    Echo    Interpretation Summary  · The estimated ejection fraction is 60%.  · The left ventricle is normal in size with concentric remodeling and normal systolic function.  · Normal left ventricular diastolic function.  · Normal right ventricular size with normal right ventricular systolic function.  · Severe left atrial enlargement.  · The estimated PA systolic pressure is 39 mmHg.  · Intermediate central venous pressure (8 mmHg).  · Mild right atrial enlargement.

## 2023-04-29 NOTE — HPI
76 year old female with PMH of HFpEF, CKD3, HTN, DM2, HLD, and recently diagnosed AF who presented on 4/28 for SOB x 1 week. Associated fatigue, abd distention, worsening LE edema (has legs wrapped, follows with wound care for venous stasis ulcers), and orthopnea. Patient uses a electric wheelchair to ambulate however at baseline states she is able to stand up.  She reports feeling very unsteady and weak when attempting to ambulate. No chest pain. Of note, seen in ED on 3/31 for fall. EKG at that time was consistent with rate controlled AF (new diagnosis). Not started on OAC. On arrival, hypertensive up to 227 systolic. Work up consistent with ADHF with BNP 300s and CXR with pulm edema. LOREN with CR 2.2 (baseline possibly around 1.7), trop WNL. EKG on admit with a lot of artifact but irregular RR intervals, likely AF. Admitted to  for further care. She was started on lasix 80mg IV BID with good UOP. Cardiology consulted for ADHF and new AF.

## 2023-04-29 NOTE — NURSING
Admitted to room [address]336 from Ed. Alert and oriented. Speech is clear. VS stable. Temp 97.4 orally. No WOB or sign of distress. On room air. Purewick in place. Safety measures in place. Bed in low position. Call light in reach.

## 2023-04-29 NOTE — PLAN OF CARE
[facility] - Emergency Dept  Initial Discharge Assessment       Primary Care Provider: Darby Baum MD    Admission Diagnosis: LOREN (acute kidney injury) [N17.9]    Admission Date: 4/28/2023  Expected Discharge Date:   Unknown at this time.    Discharge Barriers Identified: (P) None    Payor: [employer] MANAGED MEDICARE / Plan: [employer] MEDICARE HMO / Product Type: Capitation /     Extended Emergency Contact Information  Primary Emergency Contact: Jazmyn Blanchard   [place]  Mobile Phone: [phone]  Relation: Daughter  Secondary Emergency Contact: Hayley Velasco   [place]  Mobile Phone: [phone]  Relation: Daughter    Discharge Plan A: (P) Home with family  Discharge Plan B: (P) Home      [facility] - [facility] [address] [facility]  [address]  [facility] [address]  Phone: [phone] Fax: [phone]    [id number] - [facility] [place] - [address] AT [facility] [address] [place]  [address]  [place] [address]  Phone: [phone] Fax: [phone]      Initial Assessment (most recent)       Adult Discharge Assessment - 04/29/23 0132          Discharge Assessment    Assessment Type Discharge Planning Assessment (P)      Confirmed/corrected address, phone number and insurance Yes (P)      Confirmed Demographics Correct on Facesheet (P)      Source of Information patient (P)      Does patient/caregiver understand observation status Yes (P)      Communicated GABE with patient/caregiver Date not available/Unable to determine (P)      People in Home alone (P)      Facility Arrived From: pt's home (P)      Do you expect to return to your current living situation? Yes (P)      Do you have help at home or someone to help you manage your care at home? Yes (P)      Who are your caregiver(s) and their phone number(s)? Jazmyn Blanchard,  [phone], Leigh Ann Velasco  [phone] and pt's son Kannan Ortiz (P)      Prior to hospitilization  cognitive status: Alert/Oriented (P)      Current cognitive status: Alert/Oriented (P)      Walking or Climbing Stairs ambulation difficulty, requires equipment (P)      Mobility Management pt has difficulty walking and pt  stated she has a wheel chair (P)      Dressing/Bathing -- (P)    pt stated due to her wound, she baths outsife of the shower    Home Accessibility wheelchair accessible (P)      Home Layout Able to live on 1st floor (P)      Equipment Currently Used at Home wheelchair (P)      Readmission within 30 days? Yes (P)      Patient currently being followed by outpatient case management? No (P)      Do you currently have service(s) that help you manage your care at home? Yes (P)      Name and Contact number of agency [facility] (P)      Is the pt/caregiver preference to resume services with current agency Yes (P)      Do you take prescription medications? Yes (P)      Do you have prescription coverage? Yes (P)      Do you have any problems affording any of your prescribed medications? No (P)      Is the patient taking medications as prescribed? yes (P)      Who is going to help you get home at discharge? Hayley Albert (P)      How do you get to doctors appointments? family or friend will provide (P)      Are you on dialysis? No (P)      Do you take coumadin? No (P)      Discharge Plan A Home with family (P)      Discharge Plan B Home (P)      DME Needed Upon Discharge  other (see comments) (P)    pt has requested a PureWick    Discharge Plan discussed with: Patient (P)      Discharge Barriers Identified None (P)         Physical Activity    On average, how many minutes do you engage in exercise at this level? 0 min (P)         Financial Resource Strain    How hard is it for you to pay for the very basics like food, housing, medical care, and heating? Not hard at all (P)         Housing Stability    In the last 12 months, was there a time when you were not able to pay the mortgage  or rent on time? No (P)      In the last 12 months, how many places have you lived? 1 (P)      In the last 12 months, was there a time when you did not have a steady place to sleep or slept in a shelter (including now)? No (P)         Transportation Needs    In the past 12 months, has lack of transportation kept you from medical appointments or from getting medications? No (P)      In the past 12 months, has lack of transportation kept you from meetings, work, or from getting things needed for daily living? No (P)         Food Insecurity    Within the past 12 months, you worried that your food would run out before you got the money to buy more. Never true (P)      Within the past 12 months, the food you bought just didn't last and you didn't have money to get more. Never true (P)         Stress    Do you feel stress - tense, restless, nervous, or anxious, or unable to sleep at night because your mind is troubled all the time - these days? Only a little (P)         Social Connections    In a typical week, how many times do you talk on the phone with family, friends, or neighbors? More than three times a week (P)      How often do you get together with friends or relatives? More than three times a week (P)      How often do you attend Jewish or Catholic services? -- (P)    no since covid    Do you belong to any clubs or organizations such as Jewish groups, unions, fraternal or athletic groups, or school groups? No (P)      How often do you attend meetings of the clubs or organizations you belong to? Never (P)      Are you , , , , never , or living with a partner?  (P)         Alcohol Use    Q1: How often do you have a drink containing alcohol? Never (P)      Q2: How many drinks containing alcohol do you have on a typical day when you are drinking? Patient does not drink (P)      Q3: How often do you have six or more drinks on one occasion? Never (P)

## 2023-04-29 NOTE — SUBJECTIVE & OBJECTIVE
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    Benign essential hypertension 11/14/2012    Cataract     s/p phacoemulsification    Chronic kidney disease (CKD), stage III (moderate) 9/12/2013    Diabetic peripheral neuropathy associated with type 2 diabetes mellitus 11/14/2014    causing right hemiparesis    Gait disorder     Hyperlipidemia     Iritis - Both Eyes 6/10/2013    Kidney stone     Lymphedema     Morbid obesity with BMI of 40.0-44.9, adult 2/18/2015    Nephrolithiasis 4/20/2016    NS (nuclear sclerosis) 4/1/2013    Nuclear sclerosis - Both Eyes 4/29/2013    Preseptal cellulitis - Right Eye 4/29/2013    Proliferative diabetic retinopathy - Both Eyes 4/29/2013    Proliferative diabetic retinopathy, both eyes 4/1/2013    PSC (posterior subcapsular cataract) - Both Eyes 4/29/2013    S/P hernia repair 12/19/2012    TIA (transient ischemic attack) 11/18/2014    Tinea pedis 7/24/2012    Tinea pedis is present on both feet.     Type 2 diabetes mellitus with diabetic polyneuropathy, with long-term current use of insulin 9/18/2015    Type 2 diabetes mellitus with renal manifestations, controlled 12/12/2013    Type 2 diabetes, controlled, with moderate nonproliferative diabetic retinopathy without macular edema 9/17/2015    Ulcer of left lower extremity, limited to breakdown of skin 7/8/2015    Unspecified cerebral artery occlusion with cerebral infarction 11/16/2014    Unspecified venous (peripheral) insufficiency     Ureteral stone with hydronephrosis 1/27/2016    UTI (lower urinary tract infection)     Vaginal infection     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       Past Surgical History:   Procedure Laterality Date    ABLATION Bilateral 6/23/2022    Procedure: Ablation;  Surgeon: Vahid Farfan MD;  Location: [facility] CATH LAB/EP;  Service: Cardiology;  Laterality: Bilateral;    ABLATION Right 11/17/2022    Procedure: Ablation;  Surgeon: Vahid Farfan MD;   Location: [facility] CATH LAB/EP;  Service: Cardiology;  Laterality: Right;    APPENDECTOMY      CATARACT EXTRACTION W/  INTRAOCULAR LENS IMPLANT Left 5/21/2013    CATARACT EXTRACTION W/  INTRAOCULAR LENS IMPLANT Right 6/4/2013    CHOLECYSTECTOMY      COLONOSCOPY  12/22/2005    normal    COLONOSCOPY N/A 7/14/2022    Procedure: COLONOSCOPY;  Surgeon: Kannan Mace MD;  Location: [facility] ENDO;  Service: Endoscopy;  Laterality: N/A;    ESOPHAGOGASTRODUODENOSCOPY  12/21/2015    hiatal hernia, Schatzki ring    ESOPHAGOGASTRODUODENOSCOPY N/A 7/13/2022    Procedure: EGD (ESOPHAGOGASTRODUODENOSCOPY);  Surgeon: Kannan Mace MD;  Location: [facility] ENDO;  Service: Endoscopy;  Laterality: N/A;    EYE SURGERY Bilateral 2008    laser surgery both eyes    INTRALUMINAL GASTROINTESTINAL TRACT IMAGING VIA CAPSULE N/A 7/15/2022    Procedure: IMAGING PROCEDURE, GI TRACT, INTRALUMINAL, VIA CAPSULE;  Surgeon: Kannan Mace MD;  Location: [facility] ENDO;  Service: Endoscopy;  Laterality: N/A;    NASAL SEPTUM SURGERY      SMALL BOWEL ENTEROSCOPY N/A 7/18/2022    Procedure: ENTEROSCOPY Upper SBE;  Surgeon: Salo Frakn MD;  Location: [facility] ENDO;  Service: Endoscopy;  Laterality: N/A;    SUBTOTAL COLECTOMY  12/13/2012    transverse colon, for incarcerated umbilical hernia, Dr. Kat Bower       Review of patient's allergies indicates:   Allergen Reactions    Penicillins Hives     Other reaction(s): Hives  Patient has received cefdinir, ceftriaxone, cefazolin and cefepime in the past with no documented reactions    Sulfa (sulfonamide antibiotics) Other (See Comments)     Eyad, pt states her doctor told her the shakes were possibly caused by an allergy to sulfa       No current facility-administered medications on file prior to encounter.     Current Outpatient Medications on File Prior to Encounter   Medication Sig    aspirin 81 MG Chew Chew 1 tablet (81 mg total) by mouth once daily.    atorvastatin (LIPITOR) 40 MG tablet Take 1  "tablet (40 mg total) by mouth every evening.    insulin glargine (LANTUS U-100 INSULIN) 100 unit/mL injection Inject 20 Units into the skin every evening. (Patient taking differently: Inject 15 Units into the skin every evening.)    lisinopriL (PRINIVIL,ZESTRIL) 5 MG tablet Take 1 tablet (5 mg total) by mouth once daily.    pantoprazole (PROTONIX) 40 MG tablet Take 1 tablet (40 mg total) by mouth once daily.    sodium bicarbonate 650 MG tablet Take 1 tablet (650 mg total) by mouth 2 (two) times daily.    blood sugar diagnostic (TRUE METRIX GLUCOSE TEST STRIP) Strp TEST BLOOD SUGAR TWICE DAILY    insulin syr/ndl U100 half yesenia (DROPLET INSULIN SYR,HALF UNIT,) 0.5 mL 30 gauge x 1/2" Syrg Use to administer insulin nightly (Patient not taking: Reported on 3/23/2023)    lancets (TRUEPLUS LANCETS) 33 gauge Misc TEST TWO TIMES DAILY    sodium zirconium cyclosilicate (LOKELMA) 5 gram packet Take 1 packet (5 g total) by mouth every other day. Mix entire contents of packet(s) into drinking glass containing 3 tablespoons of water; stir well and drink immediately. Add water and repeat until no powder remains to receive entire dose. (Patient not taking: Reported on 3/23/2023)    [DISCONTINUED] blood glucose control, high (TRUE METRIX LEVEL 3) Soln Used to calibrate weekly     Family History       Problem Relation (Age of Onset)    Cataracts Sister, Brother    Diabetes Sister    Heart disease Brother    Leukemia Mother          Tobacco Use    Smoking status: Former     Packs/day: 0.50     Years: 15.00     Pack years: 7.50     Types: Cigarettes     Quit date: 1982     Years since quittin.8    Smokeless tobacco: Former    Tobacco comments:     smoked one pack per week   Substance and Sexual Activity    Alcohol use: No    Drug use: No    Sexual activity: Not Currently     Review of Systems   Constitutional: Positive for malaise/fatigue.   Cardiovascular:  Positive for leg swelling and orthopnea. Negative for chest pain, " irregular heartbeat, near-syncope and palpitations.   Respiratory:  Positive for shortness of breath.    Objective:     Vital Signs (Most Recent):  Temp: 98.5 °F (36.9 °C) (04/29/23 0622)  Pulse: 76 (04/29/23 0915)  Resp: 18 (04/29/23 0622)  BP: 132/61 (04/29/23 0915)  SpO2: 96 % (04/29/23 0622) Vital Signs (24h Range):  Temp:  [97.4 °F (36.3 °C)-98.5 °F (36.9 °C)] 98.5 °F (36.9 °C)  Pulse:  [57-76] 76  Resp:  [12-22] 18  SpO2:  [95 %-99 %] 96 %  BP: (132-227)/() 132/61     Weight: 122.5 kg (270 lb)  Body mass index is 42.29 kg/m².    SpO2: 96 %         Intake/Output Summary (Last 24 hours) at 4/29/2023 0954  Last data filed at 4/28/2023 2322  Gross per 24 hour   Intake --   Output 1950 ml   Net -1950 ml       Lines/Drains/Airways       Peripheral Intravenous Line  Duration                  Peripheral IV - Single Lumen 04/28/23 2037 20 G Left;Posterior Forearm <1 day                    Physical Exam  Vitals reviewed.   Constitutional:       Appearance: She is obese.   HENT:      Head: Normocephalic and atraumatic.      Mouth/Throat:      Mouth: Mucous membranes are moist.   Eyes:      Conjunctiva/sclera: Conjunctivae normal.   Neck:      Comments: Difficult to assess JVD given body habitus but appears to be half way up neck at 30 degrees  Cardiovascular:      Rate and Rhythm: Normal rate. Rhythm irregular.      Pulses: Normal pulses.   Pulmonary:      Effort: Pulmonary effort is normal. No respiratory distress.      Comments: Decreased breath sounds in R base, crackles noted bilaterally   Abdominal:      Palpations: Abdomen is soft.   Musculoskeletal:      Cervical back: Normal range of motion.      Right lower leg: Edema present.      Left lower leg: Edema present.      Comments: BLE wrapped   Skin:     Capillary Refill: Capillary refill takes less than 2 seconds.      Findings: No rash.   Neurological:      Mental Status: She is alert and oriented to person, place, and time.   Psychiatric:         Mood and  Affect: Mood normal.       Significant Labs: All pertinent lab results from the last 24 hours have been reviewed.

## 2023-04-29 NOTE — ASSESSMENT & PLAN NOTE
-Pt. With edema, HANDY, and PND. , CXR with pulm edema and L sided effusion consistent with HF exacerbation  -Last TTE 11/2020 shows normal EF but inderteminate DD, repeat ordered  -HF pathway ordered with IV lasix 80 mg BID, fluid restricted diet, strict intake and output  -Monitor electrolytes cloesly while diresing  -Pt. Noted to be in Afib, seems to be new diagnosis as of last month, may be contributing to worsening HF. Consulted cards

## 2023-04-29 NOTE — PT/OT/SLP EVAL
Physical Therapy Evaluation    Patient Name:  Sherin Ortiz   MRN:  [id number]  Admit Date: 4/28/2023  Admitting Diagnosis:  Acute on chronic diastolic heart failure  Length of Stay: 0 days  Recent Surgery: * No surgery found *      Recommendations:     Discharge Recommendations:  nursing facility, skilled   Discharge Equipment Recommendations: to be determined by next level of care   Barriers to discharge: Decreased caregiver support    Highest Level of Mobility: bed mobility  Assistance Needed: maximum assistance    Assessment:     Sherin Ortiz is a 78 y.o. female admitted with a medical diagnosis of Acute on chronic diastolic heart failure. Medical history includes CKD and diabetes. She is most limited by weakness. Today, she was able to perform bed mobility and sitting. She declined standing despite encouragement. Based on clinical presentation, co-morbidities, and today's performance, patient would benefit from acute skilled physical therapy services to improve functional mobility and return to max capacity prior level of function. See detailed evaluation below:    Problem List: weakness, impaired endurance, impaired sensation, impaired self care skills, impaired functional mobility, gait instability, impaired balance, decreased coordination, decreased upper extremity function, decreased lower extremity function, decreased safety awareness, pain, decreased ROM, impaired skin, edema  Rehab Prognosis: Good; patient would benefit from acute skilled PT services to address these deficits and reach maximum level of function.      Plan:     During this hospitalization, patient to be seen 3 x/week to address the identified rehab impairments via gait training, therapeutic activities, therapeutic exercises, neuromuscular re-education and progress towards the established goals.    Plan of Care Expires:  05/29/23    Subjective   Communicated with RN prior to session.  Patient found HOB elevated upon PT entry to room,  "agreeable to evaluation. "These bandages are too tight"    Chief Complaint: Weakness (Arrived via ems from home with c/o generalized weakness x5 days, also c/o abdominal discomfort)    Patient/Family Comments/goals: to get better and go home  Pain/Comfort:  Pain Rating 1:  (did not rate)  Location - Side 1: Left  Location - Orientation 1: lateral  Location 1: leg  Pain Addressed 1: Reposition, Distraction, Cessation of Activity  Pain Rating Post-Intervention 1:  (did not rate)    Living Environment:  Patient lives alone in a single story home with no steps to enter. She is modified independent and is in a power wheelchair most of the time. She is able to perform bed mobility and standing transfers without assistance. She has family that lives nearby.    Prior Level of Function:   Patient reports being modified independent with mobility & with ADLs. Patient owns DME as follows: power chair, lift device, bedside commode, walker, rolling.     Roles/Repsonsibilities:   Work: Retired. Drive: no. Managing Medicines/Managing Home: yes.     Patient reports they will have assistance from family (intermittent) upon discharge.    Objective:   Patient found with: telemetry, peripheral IV, PureWick     General Precautions: Standard, fall   Orthopedic Precautions:N/A   Braces: N/A   Oxygen Device: Room Air  Vitals: BP (!) 148/69 (BP Location: Left arm, Patient Position: Lying)   Pulse (!) 55   Temp 98.7 °F (37.1 °C) (Oral)   Resp 18   Ht 5' 7" (1.702 m)   Wt 122.5 kg (270 lb)   LMP  (LMP Unknown) Comment: patient refused to weigh/wounds to legs and toes   SpO2 100%   BMI 42.29 kg/m²     Exams:  Cognition:   Alert and Cooperative  Command following: Follows one-step commands  Fluency: clear/fluent  Hearing: Intact  Vision:  Intact visual fields  Skin Integrity: bilateral LE edema, LE wounds  Sensation: impaired to bilateral lower legs - formal test completed  Coordination: impaired  LE Strength:  L Lower Extremity:   Hip " flexion: 2+/5  Hip extension: 2+/5  Knee flexion: 3/5  Knee extension: 3-/5  Dorsiflexion: 1/5  Plantarflexion: 1/5  R Lower Extremity:   Hip flexion: 2+/5  Hip extension: 2+/5  Knee flexion: 3/5  Knee extension: 3-/5  Dorsiflexion: 1/5  Plantarflexion: 1/5  LE ROM:  L Lower Extremity: limited ankle ROM  R Lower Extremity: limited ankle ROM      Outcome Measures:  AM-PAC 6 CLICK MOBILITY  Turning over in bed (including adjusting bedclothes, sheets and blankets)?: 2  Sitting down on and standing up from a chair with arms (e.g., wheelchair, bedside commode, etc.): 1  Moving from lying on back to sitting on the side of the bed?: 2  Moving to and from a bed to a chair (including a wheelchair)?: 1  Need to walk in hospital room?: 1  Climbing 3-5 steps with a railing?: 1  Basic Mobility Total Score: 8     Functional Mobility:    Bed Mobility:  Rolling/Turning to Left: maximal assistance  Rolling/Turning to Right: maximal assistance  Supine to Sit: maximal assistance  Scooting anteriorly to EOB to have both feet planted on floor: maximal assistance  Sit to Supine: maximal assistance    Sitting Balance at Edge of Bed:  Assistance Level Required: Stand-by Assistance  Postural deviations noted: rounded shoulders, forward head, R lateral lean, posterior pelvic tilt  Patient provided with verbal and tactile cueing to maintain an upright and midline sitting posture    Education:   Patient was educated on the following:  Role of PT, plan of care, and goals  In room safety and use of call button  Importance of continued upright mobility and exercise  Balancing rest and activity      Patient left HOB elevated with all lines intact, call button in reach, and RN notified.    GOALS:   Multidisciplinary Problems       Physical Therapy Goals          Problem: Physical Therapy    Goal Priority Disciplines Outcome Goal Variances Interventions   Physical Therapy Goal     PT, PT/OT Ongoing, Progressing     Description: PT goals to be met  by: 5/19/23    Patient will perform rolling each way with supervision.  Patient will perform supine <> sitting with supervision.  Patient will perform sit <> stand transitions with supervision using RW.  Patient will perform transfers from bed <> chair or BSC with supervision using RW.                       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    Benign essential hypertension 11/14/2012    Cataract     s/p phacoemulsification    Chronic kidney disease (CKD), stage III (moderate) 9/12/2013    Diabetic peripheral neuropathy associated with type 2 diabetes mellitus 11/14/2014    causing right hemiparesis    Gait disorder     Hyperlipidemia     Iritis - Both Eyes 6/10/2013    Kidney stone     Lymphedema     Morbid obesity with BMI of 40.0-44.9, adult 2/18/2015    Nephrolithiasis 4/20/2016    NS (nuclear sclerosis) 4/1/2013    Nuclear sclerosis - Both Eyes 4/29/2013    Preseptal cellulitis - Right Eye 4/29/2013    Proliferative diabetic retinopathy - Both Eyes 4/29/2013    Proliferative diabetic retinopathy, both eyes 4/1/2013    PSC (posterior subcapsular cataract) - Both Eyes 4/29/2013    S/P hernia repair 12/19/2012    TIA (transient ischemic attack) 11/18/2014    Tinea pedis 7/24/2012    Tinea pedis is present on both feet.     Type 2 diabetes mellitus with diabetic polyneuropathy, with long-term current use of insulin 9/18/2015    Type 2 diabetes mellitus with renal manifestations, controlled 12/12/2013    Type 2 diabetes, controlled, with moderate nonproliferative diabetic retinopathy without macular edema 9/17/2015    Ulcer of left lower extremity, limited to breakdown of skin 7/8/2015    Unspecified cerebral artery occlusion with cerebral infarction 11/16/2014    Unspecified venous (peripheral) insufficiency     Ureteral stone with hydronephrosis 1/27/2016    UTI (lower urinary tract infection)     Vaginal infection     Vertical heterotropia -  Both Eyes 7/1/2013       Past Surgical History:   Procedure Laterality Date    ABLATION Bilateral 6/23/2022    Procedure: Ablation;  Surgeon: Vahid Farfan MD;  Location: [facility] CATH LAB/EP;  Service: Cardiology;  Laterality: Bilateral;    ABLATION Right 11/17/2022    Procedure: Ablation;  Surgeon: Vahid Farfan MD;  Location: [facility] CATH LAB/EP;  Service: Cardiology;  Laterality: Right;    APPENDECTOMY      CATARACT EXTRACTION W/  INTRAOCULAR LENS IMPLANT Left 5/21/2013    CATARACT EXTRACTION W/  INTRAOCULAR LENS IMPLANT Right 6/4/2013    CHOLECYSTECTOMY      COLONOSCOPY  12/22/2005    normal    COLONOSCOPY N/A 7/14/2022    Procedure: COLONOSCOPY;  Surgeon: Kannan Mace MD;  Location: [facility];  Service: Endoscopy;  Laterality: N/A;    ESOPHAGOGASTRODUODENOSCOPY  12/21/2015    hiatal hernia, Schatzki ring    ESOPHAGOGASTRODUODENOSCOPY N/A 7/13/2022    Procedure: EGD (ESOPHAGOGASTRODUODENOSCOPY);  Surgeon: Kannan Mace MD;  Location: [facility];  Service: Endoscopy;  Laterality: N/A;    EYE SURGERY Bilateral 2008    laser surgery both eyes    INTRALUMINAL GASTROINTESTINAL TRACT IMAGING VIA CAPSULE N/A 7/15/2022    Procedure: IMAGING PROCEDURE, GI TRACT, INTRALUMINAL, VIA CAPSULE;  Surgeon: Kannan Mace MD;  Location: [facility];  Service: Endoscopy;  Laterality: N/A;    NASAL SEPTUM SURGERY      SMALL BOWEL ENTEROSCOPY N/A 7/18/2022    Procedure: ENTEROSCOPY Upper SBE;  Surgeon: Salo Frank MD;  Location: [facility];  Service: Endoscopy;  Laterality: N/A;    SUBTOTAL COLECTOMY  12/13/2012    transverse colon, for incarcerated umbilical hernia, Dr. Kat Bower       Time Tracking:     PT Received On: 04/29/23  PT Start Time: 0953     PT Stop Time: 1011  PT Total Time (min): 18 min     Additional staff present: OT - Co-evaluation with OT due to patient complexity and need for two skilled therapists to ensure safe mobilization.    Billable Minutes: Evaluation 10 and Neuromuscular Re-education  8    Kari Cole, PT, DPT  4/29/2023

## 2023-04-29 NOTE — ASSESSMENT & PLAN NOTE
-Pt. Markedly HTN on presentation, SBP >200  -Suspect HTN contributing to worsening of HF, home med lisinopril 5 mg only. Holding in setting of LOREN  -Will start amlodipine 10 mg (avoid B-blockers due to bradycardia on presentation). Hydralazine PO ordered PRN

## 2023-04-29 NOTE — SUBJECTIVE & OBJECTIVE
Interval History: Patient reports Sob with lying flat. Reports intermittent cough with no sputum production. No fever or chills. Noted to have afib on EKG on presentation. Denies palpitations, lightheadedness, CP. Consulted cards to further evaluate     Review of Systems  Objective:     Vital Signs (Most Recent):  Temp: 98.7 °F (37.1 °C) (04/29/23 1035)  Pulse: 62 (04/29/23 1035)  Resp: 18 (04/29/23 1035)  BP: (!) 148/69 (04/29/23 1035)  SpO2: 100 % (04/29/23 1035)   Vital Signs (24h Range):  Temp:  [97.4 °F (36.3 °C)-98.7 °F (37.1 °C)] 98.7 °F (37.1 °C)  Pulse:  [57-76] 62  Resp:  [12-22] 18  SpO2:  [95 %-100 %] 100 %  BP: (132-227)/() 148/69     Weight: 122.5 kg (270 lb)  Body mass index is 42.29 kg/m².    Intake/Output Summary (Last 24 hours) at 4/29/2023 1100  Last data filed at 4/28/2023 2322  Gross per 24 hour   Intake --   Output 1950 ml   Net -1950 ml      Physical Exam  Vitals reviewed.   Constitutional:       General: She is not in acute distress.     Appearance: She is well-developed. She is obese.   HENT:      Head: Normocephalic and atraumatic.   Eyes:      Extraocular Movements: Extraocular movements intact.      Pupils: Pupils are equal, round, and reactive to light.   Neck:      Vascular: No JVD.      Trachea: No tracheal deviation.   Cardiovascular:      Rate and Rhythm: Normal rate and regular rhythm.      Heart sounds: No murmur heard.    No friction rub. No gallop.   Pulmonary:      Effort: No respiratory distress.      Breath sounds: Normal breath sounds. No wheezing or rales.   Abdominal:      General: Bowel sounds are normal. There is no distension.      Palpations: Abdomen is soft. There is no mass.      Tenderness: There is no abdominal tenderness.   Musculoskeletal:         General: No deformity.      Cervical back: Neck supple.      Right lower leg: Edema present.      Left lower leg: Edema present.      Comments: 3+ edema B/L   Lymphadenopathy:      Cervical: No cervical adenopathy.    Skin:     General: Skin is warm and dry.      Findings: No rash.   Neurological:      Mental Status: She is alert and oriented to person, place, and time.

## 2023-04-29 NOTE — ASSESSMENT & PLAN NOTE
-EKG today and from recent ED visit consistent with atrial fibrillation. No prior diangosis noted in chart  -Concern this may be contributing to worsening HF symptoms, will repeat EKG in am. Can discuss with cardiology potential cardioversion  -[id number] >2, but pt. Is a fall risk with recent presentations to ED for falls. Will hold off on AC for now

## 2023-04-29 NOTE — CONSULTS
[facility] - Emergency Dept  Cardiology  Consult Note    Patient Name: Sherin Ortiz  MRN: [id number]  Admission Date: 4/28/2023  Hospital Length of Stay: 0 days  Code Status: Full Code   Attending Provider: Shavonne Westbrook MD   Consulting Provider: Jaspreet Solomon MD  Primary Care Physician: Darby Baum MD  Principal Problem:Acute on chronic diastolic heart failure    Patient information was obtained from patient, past medical records and ER records.     Inpatient consult to Cardiology  Consult performed by: Jaspreet Solomon MD  Consult ordered by: Shavonne Westbrook MD        Subjective:     Chief Complaint:  sob     HPI:   76 year old female with PMH of HFpEF, CKD3, HTN, DM2, HLD, and recently diagnosed AF who presented on 4/28 for SOB x 1 week. Associated fatigue, abd distention, worsening LE edema (has legs wrapped, follows with wound care for venous stasis ulcers), and orthopnea. Patient uses a electric wheelchair to ambulate however at baseline states she is able to stand up.  She reports feeling very unsteady and weak when attempting to ambulate. No chest pain. Of note, seen in ED on 3/31 for fall. EKG at that time was consistent with rate controlled AF (new diagnosis). Not started on OAC. On arrival, hypertensive up to 227 systolic. Work up consistent with ADHF with BNP 300s and CXR with pulm edema. LOREN with CR 2.2 (baseline possibly around 1.7), trop WNL. EKG on admit with a lot of artifact but irregular RR intervals, likely AF. Admitted to  for further care. She was started on lasix 80mg IV BID with good UOP. Cardiology consulted for ADHF and new AF.             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    Benign essential hypertension 11/14/2012    Cataract     s/p phacoemulsification    Chronic kidney disease (CKD), stage III (moderate) 9/12/2013    Diabetic peripheral neuropathy associated with type 2 diabetes mellitus 11/14/2014    causing right  hemiparesis    Gait disorder     Hyperlipidemia     Iritis - Both Eyes 6/10/2013    Kidney stone     Lymphedema     Morbid obesity with BMI of 40.0-44.9, adult 2/18/2015    Nephrolithiasis 4/20/2016    NS (nuclear sclerosis) 4/1/2013    Nuclear sclerosis - Both Eyes 4/29/2013    Preseptal cellulitis - Right Eye 4/29/2013    Proliferative diabetic retinopathy - Both Eyes 4/29/2013    Proliferative diabetic retinopathy, both eyes 4/1/2013    PSC (posterior subcapsular cataract) - Both Eyes 4/29/2013    S/P hernia repair 12/19/2012    TIA (transient ischemic attack) 11/18/2014    Tinea pedis 7/24/2012    Tinea pedis is present on both feet.     Type 2 diabetes mellitus with diabetic polyneuropathy, with long-term current use of insulin 9/18/2015    Type 2 diabetes mellitus with renal manifestations, controlled 12/12/2013    Type 2 diabetes, controlled, with moderate nonproliferative diabetic retinopathy without macular edema 9/17/2015    Ulcer of left lower extremity, limited to breakdown of skin 7/8/2015    Unspecified cerebral artery occlusion with cerebral infarction 11/16/2014    Unspecified venous (peripheral) insufficiency     Ureteral stone with hydronephrosis 1/27/2016    UTI (lower urinary tract infection)     Vaginal infection     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       Past Surgical History:   Procedure Laterality Date    ABLATION Bilateral 6/23/2022    Procedure: Ablation;  Surgeon: Vahid Farfan MD;  Location: [facility] CATH LAB/EP;  Service: Cardiology;  Laterality: Bilateral;    ABLATION Right 11/17/2022    Procedure: Ablation;  Surgeon: Vahid Farfan MD;  Location: [facility] CATH LAB/EP;  Service: Cardiology;  Laterality: Right;    APPENDECTOMY      CATARACT EXTRACTION W/  INTRAOCULAR LENS IMPLANT Left 5/21/2013    CATARACT EXTRACTION W/  INTRAOCULAR LENS IMPLANT Right 6/4/2013    CHOLECYSTECTOMY      COLONOSCOPY  12/22/2005    normal    COLONOSCOPY N/A 7/14/2022    Procedure: COLONOSCOPY;  Surgeon:  Kannan Mace MD;  Location: [facility] ENDO;  Service: Endoscopy;  Laterality: N/A;    ESOPHAGOGASTRODUODENOSCOPY  12/21/2015    hiatal hernia, Schatzki ring    ESOPHAGOGASTRODUODENOSCOPY N/A 7/13/2022    Procedure: EGD (ESOPHAGOGASTRODUODENOSCOPY);  Surgeon: Kannan Mace MD;  Location: [facility] ENDO;  Service: Endoscopy;  Laterality: N/A;    EYE SURGERY Bilateral 2008    laser surgery both eyes    INTRALUMINAL GASTROINTESTINAL TRACT IMAGING VIA CAPSULE N/A 7/15/2022    Procedure: IMAGING PROCEDURE, GI TRACT, INTRALUMINAL, VIA CAPSULE;  Surgeon: Kannan Mace MD;  Location: [facility] ENDO;  Service: Endoscopy;  Laterality: N/A;    NASAL SEPTUM SURGERY      SMALL BOWEL ENTEROSCOPY N/A 7/18/2022    Procedure: ENTEROSCOPY Upper SBE;  Surgeon: Salo Frank MD;  Location: [facility] ENDO;  Service: Endoscopy;  Laterality: N/A;    SUBTOTAL COLECTOMY  12/13/2012    transverse colon, for incarcerated umbilical hernia, Dr. Kat Bower       Review of patient's allergies indicates:   Allergen Reactions    Penicillins Hives     Other reaction(s): Hives  Patient has received cefdinir, ceftriaxone, cefazolin and cefepime in the past with no documented reactions    Sulfa (sulfonamide antibiotics) Other (See Comments)     Eyad, pt states her doctor told her the shakes were possibly caused by an allergy to sulfa       No current facility-administered medications on file prior to encounter.     Current Outpatient Medications on File Prior to Encounter   Medication Sig    aspirin 81 MG Chew Chew 1 tablet (81 mg total) by mouth once daily.    atorvastatin (LIPITOR) 40 MG tablet Take 1 tablet (40 mg total) by mouth every evening.    insulin glargine (LANTUS U-100 INSULIN) 100 unit/mL injection Inject 20 Units into the skin every evening. (Patient taking differently: Inject 15 Units into the skin every evening.)    lisinopriL (PRINIVIL,ZESTRIL) 5 MG tablet Take 1 tablet (5 mg total) by mouth once daily.    pantoprazole (PROTONIX)  "40 MG tablet Take 1 tablet (40 mg total) by mouth once daily.    sodium bicarbonate 650 MG tablet Take 1 tablet (650 mg total) by mouth 2 (two) times daily.    blood sugar diagnostic (TRUE METRIX GLUCOSE TEST STRIP) Strp TEST BLOOD SUGAR TWICE DAILY    insulin syr/ndl U100 half yesenia (DROPLET INSULIN SYR,HALF UNIT,) 0.5 mL 30 gauge x 1/2" Syrg Use to administer insulin nightly (Patient not taking: Reported on 3/23/2023)    lancets (TRUEPLUS LANCETS) 33 gauge Misc TEST TWO TIMES DAILY    sodium zirconium cyclosilicate (LOKELMA) 5 gram packet Take 1 packet (5 g total) by mouth every other day. Mix entire contents of packet(s) into drinking glass containing 3 tablespoons of water; stir well and drink immediately. Add water and repeat until no powder remains to receive entire dose. (Patient not taking: Reported on 3/23/2023)    [DISCONTINUED] blood glucose control, high (TRUE METRIX LEVEL 3) Soln Used to calibrate weekly     Family History       Problem Relation (Age of Onset)    Cataracts Sister, Brother    Diabetes Sister    Heart disease Brother    Leukemia Mother          Tobacco Use    Smoking status: Former     Packs/day: 0.50     Years: 15.00     Pack years: 7.50     Types: Cigarettes     Quit date: 1982     Years since quittin.8    Smokeless tobacco: Former    Tobacco comments:     smoked one pack per week   Substance and Sexual Activity    Alcohol use: No    Drug use: No    Sexual activity: Not Currently     Review of Systems   Constitutional: Positive for malaise/fatigue.   Cardiovascular:  Positive for leg swelling and orthopnea. Negative for chest pain, irregular heartbeat, near-syncope and palpitations.   Respiratory:  Positive for shortness of breath.    Objective:     Vital Signs (Most Recent):  Temp: 98.5 °F (36.9 °C) (23)  Pulse: 76 (23 0915)  Resp: 18 (23)  BP: 132/61 (23 0915)  SpO2: 96 % (23) Vital Signs (24h Range):  Temp:  [97.4 °F (36.3 °C)-98.5 " °F (36.9 °C)] 98.5 °F (36.9 °C)  Pulse:  [57-76] 76  Resp:  [12-22] 18  SpO2:  [95 %-99 %] 96 %  BP: (132-227)/() 132/61     Weight: 122.5 kg (270 lb)  Body mass index is 42.29 kg/m².    SpO2: 96 %         Intake/Output Summary (Last 24 hours) at 4/29/2023 0954  Last data filed at 4/28/2023 2322  Gross per 24 hour   Intake --   Output 1950 ml   Net -1950 ml       Lines/Drains/Airways       Peripheral Intravenous Line  Duration                  Peripheral IV - Single Lumen 04/28/23 2037 20 G Left;Posterior Forearm <1 day                    Physical Exam  Vitals reviewed.   Constitutional:       Appearance: She is obese.   HENT:      Head: Normocephalic and atraumatic.      Mouth/Throat:      Mouth: Mucous membranes are moist.   Eyes:      Conjunctiva/sclera: Conjunctivae normal.   Neck:      Comments: Difficult to assess JVD given body habitus but appears to be half way up neck at 30 degrees  Cardiovascular:      Rate and Rhythm: Normal rate. Rhythm irregular.      Pulses: Normal pulses.   Pulmonary:      Effort: Pulmonary effort is normal. No respiratory distress.      Comments: Decreased breath sounds in R base, crackles noted bilaterally   Abdominal:      Palpations: Abdomen is soft.   Musculoskeletal:      Cervical back: Normal range of motion.      Right lower leg: Edema present.      Left lower leg: Edema present.      Comments: BLE wrapped   Skin:     Capillary Refill: Capillary refill takes less than 2 seconds.      Findings: No rash.   Neurological:      Mental Status: She is alert and oriented to person, place, and time.   Psychiatric:         Mood and Affect: Mood normal.       Significant Labs: All pertinent lab results from the last 24 hours have been reviewed.        Assessment and Plan:     * Acute on chronic diastolic heart failure    Lab Results   Component Value Date     (H) 04/28/2023     (H) 07/12/2022     (H) 11/21/2020     (H) 01/21/2018     (H)  01/06/2018      EF   Date Value Ref Range Status   04/29/2023 60 % Final     Assessment and Recs:  - exam and labs consistent with acute decompensated heart failure, no current evidence of shock   - can hold further diuresis for now given adequate UOP, will reassess in AM   - formal TTE below   - Fluid restriction at 1.5L/day, strict I/Os and daily standing weights, maintain on telemetry and daily EKGs, maintain Mag >2 & K+ >4  - SCDs, TEDs, Nursing communication to elevated LE, ambulate as tolerated      Results for orders placed during the hospital encounter of 04/28/23    Echo    Interpretation Summary  · The estimated ejection fraction is 60%.  · The left ventricle is normal in size with concentric remodeling and normal systolic function.  · Normal left ventricular diastolic function.  · Normal right ventricular size with normal right ventricular systolic function.  · Severe left atrial enlargement.  · The estimated PA systolic pressure is 39 mmHg.  · Intermediate central venous pressure (8 mmHg).  · Mild right atrial enlargement.      Atrial fibrillation  Current rhythm: rate controlled AF  DCCV/Ablation:   [id number] 6 which confers 9.7% adjusted stroke risk  HAS-BLED which confers 8.9 bleeds per 100 patient-years  Home meds:     - hold BB given ADHF, currently rate controlled   - Ideally would need OAC but would discuss risk and benefits of OAC with patient given elevated HAS-BLED score in setting or recent fall, and hx of GI bleeds. She likely has too high of bleeding risk for OAC but recommend primary team have further discussions with her regarding this  - she may be candidate for watchman, refer to EP on discharge   - Telemetry, Maintain K > 4, Mg > 2          VTE Risk Mitigation (From admission, onward)           Ordered     IP VTE HIGH RISK PATIENT  Once         04/28/23 5930                    Jaspreet Solomon MD  Cardiology   [facility] - [facility] Dept

## 2023-04-29 NOTE — PT/OT/SLP EVAL
Occupational Therapy   CoEvaluation w PT  CoEval performed to optimize pt participation and assessment of full functional capacity.      Name: Sherin Ortiz  MRN: [id number]  Admitting Diagnosis: Acute on chronic diastolic heart failure  Recent Surgery: * No surgery found *      Recommendations:     Discharge Recommendations: nursing facility, skilled  Discharge Equipment Recommendations:  shower chair, to be determined by next level of care  Barriers to discharge:       Assessment:     Sherin Ortiz is a 78 y.o. female with a medical diagnosis of Acute on chronic diastolic heart failure.  She presents Aox4 w fair tolerance to session on this date. Pt w increased pain in LE limiting OOB mobility. Pt stating she is Mod I w ADLs at baseline and completes stand pivot transfers from wheelchair independently however pt currently requires increased assistance for ADL completion. Performance deficits affecting function: weakness, impaired endurance, impaired self care skills, impaired functional mobility, gait instability, impaired balance, decreased coordination, decreased upper extremity function, decreased lower extremity function, decreased safety awareness, pain, edema.    Pt motivated to return home however not at baseline for ADL and functional mobility performance in addition to concerns of fall risk and would benefit from continued OT services at this time.    Rehab Prognosis: Good; patient would benefit from acute skilled OT services to address these deficits and reach maximum level of function.       Plan:     Patient to be seen 3 x/week to address the above listed problems via self-care/home management, therapeutic activities, therapeutic exercises, neuromuscular re-education  Plan of Care Expires: 05/29/23  Plan of Care Reviewed with: patient    Subjective     Chief Complaint: LE pain   Patient/Family Comments/goals: agreeable to OT    Occupational Profile:  Living Environment: Lives alone in [facility], 0 DES,  WIS w pt using BSC at shower  chair  Previous level of function: Mod I w ADLs  Equipment Used at Home: lift device, power chair, bedside commode, walker, rolling  Assistance upon Discharge: family lives across the street    Pain/Comfort:  Pain Rating 1:  (did not rate)  Location - Side 1: Bilateral  Location - Orientation 1: generalized  Location 1: leg  Pain Addressed 1: Reposition, Distraction, Cessation of Activity    Patients cultural, spiritual, Voodoo conflicts given the current situation: no    Objective:     Communicated with: RN prior to session.  Patient found supine with telemetry, peripheral IV, PureWick upon OT entry to room.    General Precautions: Standard, fall  Orthopedic Precautions: N/A  Braces: N/A  Respiratory Status: Room air    Occupational Performance:    Bed Mobility:    Patient completed Rolling/Turning to Left with  maximal assistance  Patient completed Rolling/Turning to Right with maximal assistance  Patient completed Supine to Sit with maximal assistance  Patient completed Sit to Supine with maximal assistance  CGA for EOB balance   Functional Mobility/Transfers:  Functional Mobility: Pt deferred 2/2 pain     Activities of Daily Living:  Not completed     Cognitive/Visual Perceptual:  Cognitive/Psychosocial Skills:     -       Oriented to: Person, Place, Time, and Situation   -       Follows Commands/attention:Follows multistep  commands  -       Communication: clear/fluent  -       Safety awareness/insight to disability: intact     Physical Exam:  Dominant hand:    -       R hand  Upper Extremity Range of Motion:     -       Right Upper Extremity: decreased AROM 2/2 hx of RC injury; PROM <120 degrees   -       Left Upper Extremity: WFL  Upper Extremity Strength:    -       Right Upper Extremity: 3/5  -       Left Upper Extremity: WFL   Strength:    -       Right Upper Extremity: WFL  -       Left Upper Extremity: WFL    AMPAC 6 Click ADL:  AMPAC Total Score: 17    Treatment &  Education:  Pt educated on scope of practice and importance of daily functional mobility.   Pt educated on safety precautions during transfers  Pt updated on POC    Patient left supine with all lines intact, call button in reach, and R notified    GOALS:   Multidisciplinary Problems       Occupational Therapy Goals          Problem: Occupational Therapy    Goal Priority Disciplines Outcome Interventions   Occupational Therapy Goal     OT, PT/OT Ongoing, Progressing    Description: Goals to be met by: 5/13/23     Patient will increase functional independence with ADLs by performing:    UE Dressing with Modified [place].  LE Dressing with Stand-by Assistance.  Grooming while EOB with Modified [place].  Toileting from bedside commode with Stand-by Assistance for hygiene and clothing management.   Toilet transfer to bedside commode with Contact Guard Assistance.                         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    Benign essential hypertension 11/14/2012    Cataract     s/p phacoemulsification    Chronic kidney disease (CKD), stage III (moderate) 9/12/2013    Diabetic peripheral neuropathy associated with type 2 diabetes mellitus 11/14/2014    causing right hemiparesis    Gait disorder     Hyperlipidemia     Iritis - Both Eyes 6/10/2013    Kidney stone     Lymphedema     Morbid obesity with BMI of 40.0-44.9, adult 2/18/2015    Nephrolithiasis 4/20/2016    NS (nuclear sclerosis) 4/1/2013    Nuclear sclerosis - Both Eyes 4/29/2013    Preseptal cellulitis - Right Eye 4/29/2013    Proliferative diabetic retinopathy - Both Eyes 4/29/2013    Proliferative diabetic retinopathy, both eyes 4/1/2013    PSC (posterior subcapsular cataract) - Both Eyes 4/29/2013    S/P hernia repair 12/19/2012    TIA (transient ischemic attack) 11/18/2014    Tinea pedis 7/24/2012    Tinea pedis is present on both feet.     Type 2 diabetes mellitus with  diabetic polyneuropathy, with long-term current use of insulin 9/18/2015    Type 2 diabetes mellitus with renal manifestations, controlled 12/12/2013    Type 2 diabetes, controlled, with moderate nonproliferative diabetic retinopathy without macular edema 9/17/2015    Ulcer of left lower extremity, limited to breakdown of skin 7/8/2015    Unspecified cerebral artery occlusion with cerebral infarction 11/16/2014    Unspecified venous (peripheral) insufficiency     Ureteral stone with hydronephrosis 1/27/2016    UTI (lower urinary tract infection)     Vaginal infection     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         Past Surgical History:   Procedure Laterality Date    ABLATION Bilateral 6/23/2022    Procedure: Ablation;  Surgeon: Vahid Farfan MD;  Location: [facility] CATH LAB/EP;  Service: Cardiology;  Laterality: Bilateral;    ABLATION Right 11/17/2022    Procedure: Ablation;  Surgeon: Vahid Farfan MD;  Location: [facility] CATH LAB/EP;  Service: Cardiology;  Laterality: Right;    APPENDECTOMY      CATARACT EXTRACTION W/  INTRAOCULAR LENS IMPLANT Left 5/21/2013    CATARACT EXTRACTION W/  INTRAOCULAR LENS IMPLANT Right 6/4/2013    CHOLECYSTECTOMY      COLONOSCOPY  12/22/2005    normal    COLONOSCOPY N/A 7/14/2022    Procedure: COLONOSCOPY;  Surgeon: Kannan Mace MD;  Location: [facility];  Service: Endoscopy;  Laterality: N/A;    ESOPHAGOGASTRODUODENOSCOPY  12/21/2015    hiatal hernia, Schatzki ring    ESOPHAGOGASTRODUODENOSCOPY N/A 7/13/2022    Procedure: EGD (ESOPHAGOGASTRODUODENOSCOPY);  Surgeon: Kannan Mace MD;  Location: [facility];  Service: Endoscopy;  Laterality: N/A;    EYE SURGERY Bilateral 2008    laser surgery both eyes    INTRALUMINAL GASTROINTESTINAL TRACT IMAGING VIA CAPSULE N/A 7/15/2022    Procedure: IMAGING PROCEDURE, GI TRACT, INTRALUMINAL, VIA CAPSULE;  Surgeon: Kannan Mace MD;  Location: [facility];  Service: Endoscopy;  Laterality: N/A;    NASAL SEPTUM SURGERY      SMALL BOWEL  ENTEROSCOPY N/A 7/18/2022    Procedure: ENTEROSCOPY Upper SBE;  Surgeon: Salo Frank MD;  Location: [facility];  Service: Endoscopy;  Laterality: N/A;    SUBTOTAL COLECTOMY  12/13/2012    transverse colon, for incarcerated umbilical hernia, Dr. Kat Bower       Time Tracking:     OT Date of Treatment: 04/29/23  OT Start Time: 0954  OT Stop Time: 1011  OT Total Time (min): 17 min    Billable Minutes:Evaluation 10  Therapeutic Activity 7    4/29/2023

## 2023-04-29 NOTE — HPI
75 yo F with PMHx CKD3, HFpEF, HTN, DM2, and HLD who presents to he ED complaining of progressively worsening SOB and weakness x 1 week. Pt. Reports noting decreased exercise tolerance and fatigue over the last week. She has had difficulty completing her ADL's around the house. Additionally she reports difficulty sleeping because she gets SOB when she lies flat. Today, the patient felt so SOB and unsteady on her feet that she came to the ED. She denies any cough, fevers, chills, chest pain, palpitations, nausea, or vomiting. No reported worsening edema (pt. Has venous stasis at baseline)

## 2023-04-29 NOTE — ASSESSMENT & PLAN NOTE
-A1c 8.3 3/2023. Home reigmen lantus 15 units QHS  -Levemir 8 units QHS with sliding scale insulin and diabetic diet ordered while inpatient

## 2023-04-29 NOTE — H&P
[facility] - Emergency Dept  [facility] Medicine  History & Physical    Patient Name: Sherin Ortiz  MRN: [id number]  Patient Class: OP- Observation  Admission Date: 4/28/2023  Attending Physician: Juvencio Carmichael MD   Primary Care Provider: Darby Baum MD         Patient information was obtained from patient, past medical records and ER records.     Subjective:     Principal Problem:Acute on chronic diastolic heart failure    Chief Complaint:   Chief Complaint   Patient presents with    Weakness     Arrived via ems from home with c/o generalized weakness x5 days, also c/o abdominal discomfort        HPI: 75 yo F with PMHx CKD3, HFpEF, HTN, DM2, and HLD who presents to he ED complaining of progressively worsening SOB and weakness x 1 week. Pt. Reports noting decreased exercise tolerance and fatigue over the last week. She has had difficulty completing her ADL's around the house. Additionally she reports difficulty sleeping because she gets SOB when she lies flat. Today, the patient felt so SOB and unsteady on her feet that she came to the ED. She denies any cough, fevers, chills, chest pain, palpitations, nausea, or vomiting. No reported worsening edema (pt. 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    Benign essential hypertension 11/14/2012    Cataract     s/p phacoemulsification    Chronic kidney disease (CKD), stage III (moderate) 9/12/2013    Diabetic peripheral neuropathy associated with type 2 diabetes mellitus 11/14/2014    causing right hemiparesis    Gait disorder     Hyperlipidemia     Iritis - Both Eyes 6/10/2013    Kidney stone     Lymphedema     Morbid obesity with BMI of 40.0-44.9, adult 2/18/2015    Nephrolithiasis 4/20/2016    NS (nuclear sclerosis) 4/1/2013    Nuclear sclerosis - Both Eyes 4/29/2013    Preseptal cellulitis - Right Eye 4/29/2013    Proliferative diabetic retinopathy - Both Eyes  4/29/2013    Proliferative diabetic retinopathy, both eyes 4/1/2013    PSC (posterior subcapsular cataract) - Both Eyes 4/29/2013    S/P hernia repair 12/19/2012    TIA (transient ischemic attack) 11/18/2014    Tinea pedis 7/24/2012    Tinea pedis is present on both feet.     Type 2 diabetes mellitus with diabetic polyneuropathy, with long-term current use of insulin 9/18/2015    Type 2 diabetes mellitus with renal manifestations, controlled 12/12/2013    Type 2 diabetes, controlled, with moderate nonproliferative diabetic retinopathy without macular edema 9/17/2015    Ulcer of left lower extremity, limited to breakdown of skin 7/8/2015    Unspecified cerebral artery occlusion with cerebral infarction 11/16/2014    Unspecified venous (peripheral) insufficiency     Ureteral stone with hydronephrosis 1/27/2016    UTI (lower urinary tract infection)     Vaginal infection     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       Past Surgical History:   Procedure Laterality Date    ABLATION Bilateral 6/23/2022    Procedure: Ablation;  Surgeon: Vahid Farfan MD;  Location: [facility] CATH LAB/EP;  Service: Cardiology;  Laterality: Bilateral;    ABLATION Right 11/17/2022    Procedure: Ablation;  Surgeon: Vahid Farfan MD;  Location: [facility] CATH LAB/EP;  Service: Cardiology;  Laterality: Right;    APPENDECTOMY      CATARACT EXTRACTION W/  INTRAOCULAR LENS IMPLANT Left 5/21/2013    CATARACT EXTRACTION W/  INTRAOCULAR LENS IMPLANT Right 6/4/2013    CHOLECYSTECTOMY      COLONOSCOPY  12/22/2005    normal    COLONOSCOPY N/A 7/14/2022    Procedure: COLONOSCOPY;  Surgeon: Kannan Mace MD;  Location: [facility];  Service: Endoscopy;  Laterality: N/A;    ESOPHAGOGASTRODUODENOSCOPY  12/21/2015    hiatal hernia, Schatzki ring    ESOPHAGOGASTRODUODENOSCOPY N/A 7/13/2022    Procedure: EGD (ESOPHAGOGASTRODUODENOSCOPY);  Surgeon: Kannan Mace MD;  Location: [facility];  Service: Endoscopy;  Laterality: N/A;    EYE  SURGERY Bilateral 2008    laser surgery both eyes    INTRALUMINAL GASTROINTESTINAL TRACT IMAGING VIA CAPSULE N/A 7/15/2022    Procedure: IMAGING PROCEDURE, GI TRACT, INTRALUMINAL, VIA CAPSULE;  Surgeon: Kannan Mace MD;  Location: [facility] ENDO;  Service: Endoscopy;  Laterality: N/A;    NASAL SEPTUM SURGERY      SMALL BOWEL ENTEROSCOPY N/A 7/18/2022    Procedure: ENTEROSCOPY Upper SBE;  Surgeon: Salo Frank MD;  Location: [facility] ENDO;  Service: Endoscopy;  Laterality: N/A;    SUBTOTAL COLECTOMY  12/13/2012    transverse colon, for incarcerated umbilical hernia, Dr. Kat Bower       Review of patient's allergies indicates:   Allergen Reactions    Penicillins Hives     Other reaction(s): Hives  Patient has received cefdinir, ceftriaxone, cefazolin and cefepime in the past with no documented reactions    Sulfa (sulfonamide antibiotics) Other (See Comments)     Shakes, pt states her doctor told her the shakes were possibly caused by an allergy to sulfa       No current facility-administered medications on file prior to encounter.     Current Outpatient Medications on File Prior to Encounter   Medication Sig    aspirin 81 MG Chew Chew 1 tablet (81 mg total) by mouth once daily.    atorvastatin (LIPITOR) 40 MG tablet Take 1 tablet (40 mg total) by mouth every evening.    insulin glargine (LANTUS U-100 INSULIN) 100 unit/mL injection Inject 20 Units into the skin every evening. (Patient taking differently: Inject 15 Units into the skin every evening.)    lisinopriL (PRINIVIL,ZESTRIL) 5 MG tablet Take 1 tablet (5 mg total) by mouth once daily.    pantoprazole (PROTONIX) 40 MG tablet Take 1 tablet (40 mg total) by mouth once daily.    sodium bicarbonate 650 MG tablet Take 1 tablet (650 mg total) by mouth 2 (two) times daily.    blood sugar diagnostic (TRUE METRIX GLUCOSE TEST STRIP) Strp TEST BLOOD SUGAR TWICE DAILY    insulin syr/ndl U100 half yesenia (DROPLET INSULIN SYR,HALF UNIT,) 0.5 mL 30 gauge x  "1/2" Syrg Use to administer insulin nightly (Patient not taking: Reported on 3/23/2023)    lancets (TRUEPLUS LANCETS) 33 gauge Misc TEST TWO TIMES DAILY    sodium zirconium cyclosilicate (LOKELMA) 5 gram packet Take 1 packet (5 g total) by mouth every other day. Mix entire contents of packet(s) into drinking glass containing 3 tablespoons of water; stir well and drink immediately. Add water and repeat until no powder remains to receive entire dose. (Patient not taking: Reported on 3/23/2023)    [DISCONTINUED] blood glucose control, high (TRUE METRIX LEVEL 3) Soln Used to calibrate weekly     Family History       Problem Relation (Age of Onset)    Cataracts Sister, Brother    Diabetes Sister    Heart disease Brother    Leukemia Mother          Tobacco Use    Smoking status: Former     Packs/day: 0.50     Years: 15.00     Pack years: 7.50     Types: Cigarettes     Quit date: 1982     Years since quittin.8    Smokeless tobacco: Former    Tobacco comments:     smoked one pack per week   Substance and Sexual Activity    Alcohol use: No    Drug use: No    Sexual activity: Not Currently     Review of Systems   Constitutional:  Negative for activity change, appetite change, chills, fever and unexpected weight change.   HENT:  Negative for congestion and sore throat.    Respiratory:  Positive for chest tightness and shortness of breath. Negative for cough.    Cardiovascular:  Positive for leg swelling. Negative for chest pain and palpitations.   Gastrointestinal:  Negative for abdominal distention, abdominal pain, blood in stool, constipation, diarrhea, nausea and vomiting.   Genitourinary:  Negative for difficulty urinating, dysuria and hematuria.   Musculoskeletal:  Negative for arthralgias and myalgias.   Skin:  Negative for color change and rash.   Neurological:  Negative for dizziness, tremors and seizures.   Objective:     Vital Signs (Most Recent):  Temp: 98 °F (36.7 °C) (23)  Pulse: 64 " (04/28/23 2205)  Resp: (!) 21 (04/28/23 2205)  BP: (!) 192/82 (04/28/23 2205)  SpO2: 99 % (04/28/23 2205)   Vital Signs (24h Range):  Temp:  [97.4 °F (36.3 °C)-98 °F (36.7 °C)] 98 °F (36.7 °C)  Pulse:  [58-69] 64  Resp:  [12-22] 21  SpO2:  [95 %-99 %] 99 %  BP: (187-227)/() 192/82     Weight: 122.5 kg (270 lb)  Body mass index is 42.29 kg/m².    Physical Exam  Vitals reviewed.   Constitutional:       General: She is not in acute distress.     Appearance: She is well-developed. She is obese.   HENT:      Head: Normocephalic and atraumatic.   Eyes:      Extraocular Movements: Extraocular movements intact.      Pupils: Pupils are equal, round, and reactive to light.   Neck:      Vascular: No JVD.      Trachea: No tracheal deviation.   Cardiovascular:      Rate and Rhythm: Normal rate and regular rhythm.      Heart sounds: No murmur heard.    No friction rub. No gallop.   Pulmonary:      Effort: No respiratory distress.      Breath sounds: Normal breath sounds. No wheezing or rales.   Abdominal:      General: Bowel sounds are normal. There is no distension.      Palpations: Abdomen is soft. There is no mass.      Tenderness: There is no abdominal tenderness.   Musculoskeletal:         General: No deformity.      Cervical back: Neck supple.      Right lower leg: Edema present.      Left lower leg: Edema present.      Comments: 3+ edema B/L   Lymphadenopathy:      Cervical: No cervical adenopathy.   Skin:     General: Skin is warm and dry.      Findings: No rash.   Neurological:      Mental Status: She is alert and oriented to person, place, and time.         CRANIAL NERVES     CN III, IV, VI   Pupils are equal, round, and reactive to light.     Significant Labs: All pertinent labs within the past 24 hours have been reviewed.    Significant Imaging: I have reviewed all pertinent imaging results/findings within the past 24 hours.    Assessment/Plan:     * Acute on chronic diastolic heart failure  -Pt. With edema,  HANDY, and PND. , CXR with pulm edema and L sided effusion consistent with HF exacerbation  -Last TTE 11/2020 shows normal EF but inderteminate DD, repeat ordered  -HF pathway ordered with IV lasix 80 mg BID, fluid restricted diet, strict intake and output  -Monitor electrolytes cloesly while diresing  -Pt. Noted to be in Afib, seems to be new diagnosis as of last month, may be contributing to worsening HF, can discuss potential cardioversion with cards        Atrial fibrillation  -EKG today and from recent ED visit consistent with atrial fibrillation. No prior diangosis noted in chart  -Concern this may be contributing to worsening HF symptoms, will repeat EKG in am. Can discuss with cardiology potential cardioversion  -[id number] >2, but pt. Is a fall risk with recent presentations to ED for falls. Will hold off on AC for now      Type 2 diabetes mellitus, with long-term current use of insulin  -A1c 8.3 3/2023. Home reigmen lantus 15 units QHS  -Levemir 8 units QHS with sliding scale insulin and diabetic diet ordered while inpatient    Acute renal failure superimposed on stage 3 chronic kidney disease  -Cr 2.2 on presentation, baseline ~1.8. Suspect cardiorenal in setting of HF exacerbation  -Urine electrolytes ordered  -Monitor Cr and other electrolytes closely while diuresing    Debility  -PT/OT consult      Essential hypertension  -Pt. Markedly HTN on presentation, SBP >200  -Suspect HTN contributing to worsening of HF, home med lisinopril 5 mg only. Holding in setting of LOREN  -Will start amlodipine 10 mg (avoid B-blockers due to bradycardia on presentation). Hydralazine PO ordered PRN        VTE Risk Mitigation (From admission, onward)         Ordered     IP VTE HIGH RISK PATIENT  Once         04/28/23 2238                   On 04/28/2023, patient should be placed in hospital observation services under my care.        Juvencio Carmichael MD  Department of Hospital Medicine  [facility] - Emergency Dept

## 2023-04-29 NOTE — ASSESSMENT & PLAN NOTE
-Cr 2.2 on presentation, baseline ~1.8. Suspect cardiorenal in setting of HF exacerbation  -Urine electrolytes ordered  -Monitor Cr and other electrolytes closely while diuresing

## 2023-04-29 NOTE — ASSESSMENT & PLAN NOTE
-EKG today and from recent ED visit consistent with atrial fibrillation. No prior diangosis noted in chart  -Concern this may be contributing to worsening HF symptoms.  -[id number] >2, but pt. Is a fall risk with recent presentations to ED for falls. Will hold off on AC for now  -Consulted cardiology

## 2023-04-29 NOTE — SUBJECTIVE & OBJECTIVE
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    Benign essential hypertension 11/14/2012    Cataract     s/p phacoemulsification    Chronic kidney disease (CKD), stage III (moderate) 9/12/2013    Diabetic peripheral neuropathy associated with type 2 diabetes mellitus 11/14/2014    causing right hemiparesis    Gait disorder     Hyperlipidemia     Iritis - Both Eyes 6/10/2013    Kidney stone     Lymphedema     Morbid obesity with BMI of 40.0-44.9, adult 2/18/2015    Nephrolithiasis 4/20/2016    NS (nuclear sclerosis) 4/1/2013    Nuclear sclerosis - Both Eyes 4/29/2013    Preseptal cellulitis - Right Eye 4/29/2013    Proliferative diabetic retinopathy - Both Eyes 4/29/2013    Proliferative diabetic retinopathy, both eyes 4/1/2013    PSC (posterior subcapsular cataract) - Both Eyes 4/29/2013    S/P hernia repair 12/19/2012    TIA (transient ischemic attack) 11/18/2014    Tinea pedis 7/24/2012    Tinea pedis is present on both feet.     Type 2 diabetes mellitus with diabetic polyneuropathy, with long-term current use of insulin 9/18/2015    Type 2 diabetes mellitus with renal manifestations, controlled 12/12/2013    Type 2 diabetes, controlled, with moderate nonproliferative diabetic retinopathy without macular edema 9/17/2015    Ulcer of left lower extremity, limited to breakdown of skin 7/8/2015    Unspecified cerebral artery occlusion with cerebral infarction 11/16/2014    Unspecified venous (peripheral) insufficiency     Ureteral stone with hydronephrosis 1/27/2016    UTI (lower urinary tract infection)     Vaginal infection     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       Past Surgical History:   Procedure Laterality Date    ABLATION Bilateral 6/23/2022    Procedure: Ablation;  Surgeon: Vahid Farfan MD;  Location: [facility] CATH LAB/EP;  Service: Cardiology;  Laterality: Bilateral;    ABLATION Right 11/17/2022    Procedure: Ablation;  Surgeon: Vahid Farfan MD;   Location: [facility] CATH LAB/EP;  Service: Cardiology;  Laterality: Right;    APPENDECTOMY      CATARACT EXTRACTION W/  INTRAOCULAR LENS IMPLANT Left 5/21/2013    CATARACT EXTRACTION W/  INTRAOCULAR LENS IMPLANT Right 6/4/2013    CHOLECYSTECTOMY      COLONOSCOPY  12/22/2005    normal    COLONOSCOPY N/A 7/14/2022    Procedure: COLONOSCOPY;  Surgeon: Kannan Mace MD;  Location: [facility] ENDO;  Service: Endoscopy;  Laterality: N/A;    ESOPHAGOGASTRODUODENOSCOPY  12/21/2015    hiatal hernia, Schatzki ring    ESOPHAGOGASTRODUODENOSCOPY N/A 7/13/2022    Procedure: EGD (ESOPHAGOGASTRODUODENOSCOPY);  Surgeon: Kannan Mace MD;  Location: [facility] ENDO;  Service: Endoscopy;  Laterality: N/A;    EYE SURGERY Bilateral 2008    laser surgery both eyes    INTRALUMINAL GASTROINTESTINAL TRACT IMAGING VIA CAPSULE N/A 7/15/2022    Procedure: IMAGING PROCEDURE, GI TRACT, INTRALUMINAL, VIA CAPSULE;  Surgeon: Kannan Mace MD;  Location: [facility] ENDO;  Service: Endoscopy;  Laterality: N/A;    NASAL SEPTUM SURGERY      SMALL BOWEL ENTEROSCOPY N/A 7/18/2022    Procedure: ENTEROSCOPY Upper SBE;  Surgeon: Salo Frank MD;  Location: [facility] ENDO;  Service: Endoscopy;  Laterality: N/A;    SUBTOTAL COLECTOMY  12/13/2012    transverse colon, for incarcerated umbilical hernia, Dr. Kat Bower       Review of patient's allergies indicates:   Allergen Reactions    Penicillins Hives     Other reaction(s): Hives  Patient has received cefdinir, ceftriaxone, cefazolin and cefepime in the past with no documented reactions    Sulfa (sulfonamide antibiotics) Other (See Comments)     Eyad, pt states her doctor told her the shakes were possibly caused by an allergy to sulfa       No current facility-administered medications on file prior to encounter.     Current Outpatient Medications on File Prior to Encounter   Medication Sig    aspirin 81 MG Chew Chew 1 tablet (81 mg total) by mouth once daily.    atorvastatin (LIPITOR) 40 MG tablet Take 1  "tablet (40 mg total) by mouth every evening.    insulin glargine (LANTUS U-100 INSULIN) 100 unit/mL injection Inject 20 Units into the skin every evening. (Patient taking differently: Inject 15 Units into the skin every evening.)    lisinopriL (PRINIVIL,ZESTRIL) 5 MG tablet Take 1 tablet (5 mg total) by mouth once daily.    pantoprazole (PROTONIX) 40 MG tablet Take 1 tablet (40 mg total) by mouth once daily.    sodium bicarbonate 650 MG tablet Take 1 tablet (650 mg total) by mouth 2 (two) times daily.    blood sugar diagnostic (TRUE METRIX GLUCOSE TEST STRIP) Strp TEST BLOOD SUGAR TWICE DAILY    insulin syr/ndl U100 half yesenia (DROPLET INSULIN SYR,HALF UNIT,) 0.5 mL 30 gauge x 1/2" Syrg Use to administer insulin nightly (Patient not taking: Reported on 3/23/2023)    lancets (TRUEPLUS LANCETS) 33 gauge Misc TEST TWO TIMES DAILY    sodium zirconium cyclosilicate (LOKELMA) 5 gram packet Take 1 packet (5 g total) by mouth every other day. Mix entire contents of packet(s) into drinking glass containing 3 tablespoons of water; stir well and drink immediately. Add water and repeat until no powder remains to receive entire dose. (Patient not taking: Reported on 3/23/2023)    [DISCONTINUED] blood glucose control, high (TRUE METRIX LEVEL 3) Soln Used to calibrate weekly     Family History       Problem Relation (Age of Onset)    Cataracts Sister, Brother    Diabetes Sister    Heart disease Brother    Leukemia Mother          Tobacco Use    Smoking status: Former     Packs/day: 0.50     Years: 15.00     Pack years: 7.50     Types: Cigarettes     Quit date: 1982     Years since quittin.8    Smokeless tobacco: Former    Tobacco comments:     smoked one pack per week   Substance and Sexual Activity    Alcohol use: No    Drug use: No    Sexual activity: Not Currently     Review of Systems   Constitutional:  Negative for activity change, appetite change, chills, fever and unexpected weight change.   HENT:  Negative for " congestion and sore throat.    Respiratory:  Positive for chest tightness and shortness of breath. Negative for cough.    Cardiovascular:  Positive for leg swelling. Negative for chest pain and palpitations.   Gastrointestinal:  Negative for abdominal distention, abdominal pain, blood in stool, constipation, diarrhea, nausea and vomiting.   Genitourinary:  Negative for difficulty urinating, dysuria and hematuria.   Musculoskeletal:  Negative for arthralgias and myalgias.   Skin:  Negative for color change and rash.   Neurological:  Negative for dizziness, tremors and seizures.   Objective:     Vital Signs (Most Recent):  Temp: 98 °F (36.7 °C) (04/28/23 2040)  Pulse: 64 (04/28/23 2205)  Resp: (!) 21 (04/28/23 2205)  BP: (!) 192/82 (04/28/23 2205)  SpO2: 99 % (04/28/23 2205)   Vital Signs (24h Range):  Temp:  [97.4 °F (36.3 °C)-98 °F (36.7 °C)] 98 °F (36.7 °C)  Pulse:  [58-69] 64  Resp:  [12-22] 21  SpO2:  [95 %-99 %] 99 %  BP: (187-227)/() 192/82     Weight: 122.5 kg (270 lb)  Body mass index is 42.29 kg/m².    Physical Exam  Vitals reviewed.   Constitutional:       General: She is not in acute distress.     Appearance: She is well-developed. She is obese.   HENT:      Head: Normocephalic and atraumatic.   Eyes:      Extraocular Movements: Extraocular movements intact.      Pupils: Pupils are equal, round, and reactive to light.   Neck:      Vascular: No JVD.      Trachea: No tracheal deviation.   Cardiovascular:      Rate and Rhythm: Normal rate and regular rhythm.      Heart sounds: No murmur heard.    No friction rub. No gallop.   Pulmonary:      Effort: No respiratory distress.      Breath sounds: Normal breath sounds. No wheezing or rales.   Abdominal:      General: Bowel sounds are normal. There is no distension.      Palpations: Abdomen is soft. There is no mass.      Tenderness: There is no abdominal tenderness.   Musculoskeletal:         General: No deformity.      Cervical back: Neck supple.       Right lower leg: Edema present.      Left lower leg: Edema present.      Comments: 3+ edema B/L   Lymphadenopathy:      Cervical: No cervical adenopathy.   Skin:     General: Skin is warm and dry.      Findings: No rash.   Neurological:      Mental Status: She is alert and oriented to person, place, and time.         CRANIAL NERVES     CN III, IV, VI   Pupils are equal, round, and reactive to light.     Significant Labs: All pertinent labs within the past 24 hours have been reviewed.    Significant Imaging: I have reviewed all pertinent imaging results/findings within the past 24 hours.

## 2023-04-29 NOTE — PLAN OF CARE
"   04/29/23 0122   Post-Acute Status   Post-Acute Authorization Other   Other Status No Post-Acute Service Needs   Discharge Delays None known at this time   Discharge Plan   Discharge Plan A Home  (pt has requested a PureWick)   Discharge Plan B Home with family         Jennifer met pt at bedside. Pt stated she does not walk due to a wound on her leg. (Wound care comes weekly)  Pt stated  she "gets around in the wheel chair." Pt stated she performers her adl and she receives  meals daily from [employer] [facility] [address].     Pt also stated her son and  daughter in law  are  across the street, and pt's daughter in law works from home. Pt also stated her daughters are a  few minutes away. Sw asked pt if she was interested in an assisted living facility or having a sitter to come to her home for additional support, due to pt's fall; pt refused and stated she's capable to caring for herself. Pt stated she has a large support system ,and her God child comes over daily to play cards. Pt has no acute needs at this time.       Nati Maldonado LMSW  Case Management  Emergency Department  [phone]   "

## 2023-04-29 NOTE — PLAN OF CARE
Problem: Physical Therapy  Goal: Physical Therapy Goal  Description: PT goals to be met by: 5/19/23    Patient will perform rolling each way with supervision.  Patient will perform supine <> sitting with supervision.  Patient will perform sit <> stand transitions with supervision using RW.  Patient will perform transfers from bed <> chair or BSC with supervision using RW.  Outcome: Ongoing, Progressing

## 2023-04-29 NOTE — PROGRESS NOTES
Pharmacokinetic Initial Assessment: IV Vancomycin    Assessment/Plan:    Initiate intravenous vancomycin with loading dose of 2000 mg once with subsequent doses when random concentrations are less than 20 mcg/mL  Desired empiric serum trough concentration is 15 to 20 mcg/mL  Draw vancomycin random level on 4/30 at 1700.  Pharmacy will continue to follow and monitor vancomycin.      Please contact pharmacy at extension [phone] with any questions regarding this assessment.     Thank you for the consult,   Polly Cohen       Patient brief summary:  Sherin Ortiz is a 78 y.o. female initiated on antimicrobial therapy with IV Vancomycin for treatment of suspected sepsis    Drug Allergies:   Review of patient's allergies indicates:   Allergen Reactions    Penicillins Hives     Other reaction(s): Hives  Patient has received cefdinir, ceftriaxone, cefazolin and cefepime in the past with no documented reactions    Sulfa (sulfonamide antibiotics) Other (See Comments)     Eyad, pt states her doctor told her the shakes were possibly caused by an allergy to sulfa       Actual Body Weight:   122.5 kg    Renal Function:   Estimated Creatinine Clearance: 27.7 mL/min (A) (based on SCr of 2.2 mg/dL (H)).,     Dialysis Method (if applicable):  N/A    CBC (last 72 hours):  Recent Labs   Lab Result Units 04/28/23  1700 04/29/23  0411   WBC K/uL 2.81* 3.85*   Hemoglobin g/dL 9.8* 9.5*   Hematocrit % 32.5* 32.0*   Platelets K/uL 155 167   Gran % % 62.5 61.7   Lymph % % 26.3 26.8   Mono % % 9.3 9.6   Eosinophil % % 1.1 1.3   Basophil % % 0.4 0.3   Differential Method  Automated Automated       Metabolic Panel (last 72 hours):  Recent Labs   Lab Result Units 04/28/23  1700 04/28/23  1902 04/29/23  0411   Sodium mmol/L 141  --  141   Potassium mmol/L 5.3*  --  4.6   Chloride mmol/L 109  --  109   CO2 mmol/L 22*  --  25   Glucose mg/dL 247*  --  167*   Glucose, UA   --  3+*  --    BUN mg/dL 28*  --  30*   Creatinine mg/dL 2.2*  --  2.2*    Albumin g/dL 2.8*  --   --    Total Bilirubin mg/dL 0.4  --   --    Alkaline Phosphatase U/L 218*  --   --    AST U/L 17  --   --    ALT U/L 13  --   --    Magnesium mg/dL 1.9  --   --        Drug levels (last 3 results):  No results for input(s): VANCOMYCINRA, VANCORANDOM, VANCOMYCINPE, VANCOPEAK, VANCOMYCINTR, VANCOTROUGH in the last 72 hours.    Microbiologic Results:  Microbiology Results (last 7 days)       Procedure Component Value Units Date/Time    Blood culture [[id number]]     Order Status: Sent Specimen: Blood     Blood culture [[id number]]     Order Status: Sent Specimen: Blood

## 2023-04-29 NOTE — PHARMACY MED REC
"Admission Medication History     The home medication history was taken by Kira Velasco.    You may go to "Admission" then "Reconcile Home Medications" tabs to review and/or act upon these items.     The home medication list has been updated by the Pharmacy department.   Please read ALL comments highlighted in yellow.   Please address this information as you see fit.    Feel free to contact us if you have any questions or require assistance.          Current Outpatient Medications on File Prior to Encounter   Medication Sig    aspirin 81 MG Chew   Chew 1 tablet (81 mg total) by mouth once daily.    atorvastatin (LIPITOR) 40 MG tablet   Take 1 tablet (40 mg total) by mouth every evening.    insulin glargine (LANTUS U-100 INSULIN) 100 unit/mL injection   Inject 20 Units into the skin every evening. (Patient taking differently: Inject 15 Units into the skin every evening.)    lisinopriL (PRINIVIL,ZESTRIL) 5 MG tablet   Take 1 tablet (5 mg total) by mouth once daily.    pantoprazole (PROTONIX) 40 MG tablet   Take 1 tablet (40 mg total) by mouth once daily.    sodium bicarbonate 650 MG tablet   Take 1 tablet (650 mg total) by mouth 2 (two) times daily.    blood sugar diagnostic (TRUE METRIX GLUCOSE TEST STRIP) Strp   TEST BLOOD SUGAR TWICE DAILY    insulin syr/ndl U100 half yesenia (DROPLET INSULIN SYR,HALF UNIT,) 0.5 mL 30 gauge x 1/2" Syrg   Use to administer insulin nightly (Patient not taking: Reported on 3/23/2023)    lancets (TRUEPLUS LANCETS) 33 gauge Misc   TEST TWO TIMES DAILY    sodium zirconium cyclosilicate (LOKELMA) 5 gram packet           Take 1 packet (5 g total) by mouth every other day. Mix entire contents of packet(s) into drinking glass containing 3 tablespoons of water; stir well and drink immediately. Add water and repeat until no powder remains to receive entire dose. (Patient not taking: Reported on 3/23/2023)       Kira Velasco  EXT [id number]                  .        "

## 2023-04-29 NOTE — ASSESSMENT & PLAN NOTE
Exam and labs consistent with acute decompensated heart failure, no current evidence of shock. TTE with EF 60%, CVP 8, JONE 55    Recommendations;  - PO lasix 40mg BID, will need to be discharged with lasix regimen, likely PO 40mg daily    - Fluid restriction at 1.5L/day  - Strict I/Os and daily standing weights  - Maintain on telemetry and daily EKGs  - Maintain Mag >2 & K+ >4  - SCDs, TEDs, Nursing communication to elevated LE, ambulate as tolerated

## 2023-04-29 NOTE — ED NOTES
Bear hugger placed on pt.  
I assumed care of this patient at this time. Pt is resting comfortably in ED stretcher. Pt is AAOx4. RR is even, unlabored, and spontaneous; 96% on room air at this time. Pt denies pain or needs at this time. Bed low and locked; side rails up x2; call light within reach.     Patient identifiers for Sherin Ortiz 78 y.o. female checked and correct.  Chief Complaint   Patient presents with    Weakness     Arrived via ems from home with c/o generalized weakness x5 days, also c/o abdominal discomfort     
MD Team notified cardiac monitor is reading Afib with HR of 55.  
MD Team notified of  prior to meal.  
Notified PERCY Mancilla of pt hypertension. Pt denies chest pain or headache  
Pt Levofloxacin is not approved by pharmacy.  
Pt cleaned of incontinence. Provided w/ new pads, brief, and external catheter. Repositioned in bed.  
Pt sitting up in bed + eating breakfast at this time.  
Telemetry Verification   Patient placed on Telemetry Box  Verified with War Room  Box # [id number]   Monitor Tech    Rate 52   Rhythm AFib      
28-Oct-2019

## 2023-04-29 NOTE — PROGRESS NOTES
[facility] Emergency Dept  [facility] Medicine  Progress Note    Patient Name: Sherin Ortiz  MRN: [id number]  Patient Class: OP- Observation   Admission Date: 4/28/2023  Length of Stay: 0 days  Attending Physician: Shavonne Westbrook MD  Primary Care Provider: Darby Baum MD        Subjective:     Principal Problem:Acute on chronic diastolic heart failure        HPI:  77 yo F with PMHx CKD3, HFpEF, HTN, DM2, and HLD who presents to he ED complaining of progressively worsening SOB and weakness x 1 week. Pt. Reports noting decreased exercise tolerance and fatigue over the last week. She has had difficulty completing her ADL's around the house. Additionally she reports difficulty sleeping because she gets SOB when she lies flat. Today, the patient felt so SOB and unsteady on her feet that she came to the ED. She denies any cough, fevers, chills, chest pain, palpitations, nausea, or vomiting. No reported worsening edema (pt. Has venous stasis at baseline)      Overview/Hospital Course:  No notes on file    Interval History: Patient reports Sob with lying flat. Reports intermittent cough with no sputum production. No fever or chills. Noted to have afib on EKG on presentation. Denies palpitations, lightheadedness, CP. Consulted cards to further evaluate     Review of Systems  Objective:     Vital Signs (Most Recent):  Temp: 98.7 °F (37.1 °C) (04/29/23 1035)  Pulse: 62 (04/29/23 1035)  Resp: 18 (04/29/23 1035)  BP: (!) 148/69 (04/29/23 1035)  SpO2: 100 % (04/29/23 1035)   Vital Signs (24h Range):  Temp:  [97.4 °F (36.3 °C)-98.7 °F (37.1 °C)] 98.7 °F (37.1 °C)  Pulse:  [57-76] 62  Resp:  [12-22] 18  SpO2:  [95 %-100 %] 100 %  BP: (132-227)/() 148/69     Weight: 122.5 kg (270 lb)  Body mass index is 42.29 kg/m².    Intake/Output Summary (Last 24 hours) at 4/29/2023 1100  Last data filed at 4/28/2023 2322  Gross per 24 hour   Intake --   Output 1950 ml   Net -1950 ml      Physical Exam  Vitals reviewed.    Constitutional:       General: She is not in acute distress.     Appearance: She is well-developed. She is obese.   HENT:      Head: Normocephalic and atraumatic.   Eyes:      Extraocular Movements: Extraocular movements intact.      Pupils: Pupils are equal, round, and reactive to light.   Neck:      Vascular: No JVD.      Trachea: No tracheal deviation.   Cardiovascular:      Rate and Rhythm: Normal rate and regular rhythm.      Heart sounds: No murmur heard.    No friction rub. No gallop.   Pulmonary:      Effort: No respiratory distress.      Breath sounds: Normal breath sounds. No wheezing or rales.   Abdominal:      General: Bowel sounds are normal. There is no distension.      Palpations: Abdomen is soft. There is no mass.      Tenderness: There is no abdominal tenderness.   Musculoskeletal:         General: No deformity.      Cervical back: Neck supple.      Right lower leg: Edema present.      Left lower leg: Edema present.      Comments: 3+ edema B/L   Lymphadenopathy:      Cervical: No cervical adenopathy.   Skin:     General: Skin is warm and dry.      Findings: No rash.   Neurological:      Mental Status: She is alert and oriented to person, place, and time.           Assessment/Plan:      * Acute on chronic diastolic heart failure  -Pt. With edema, HANDY, and PND. , CXR with pulm edema and L sided effusion consistent with HF exacerbation  -Last TTE 11/2020 shows normal EF but inderteminate DD, repeat ordered  -HF pathway ordered with IV lasix 80 mg BID, fluid restricted diet, strict intake and output  -Monitor electrolytes cloesly while diresing  -Pt. Noted to be in Afib, seems to be new diagnosis as of last month, may be contributing to worsening HF. Consulted cards        Atrial fibrillation  -EKG today and from recent ED visit consistent with atrial fibrillation. No prior diangosis noted in chart  -Concern this may be contributing to worsening HF symptoms.  -[id number] >2, but pt. Is a fall  risk with recent presentations to ED for falls. Will hold off on AC for now  -Consulted cardiology       Type 2 diabetes mellitus, with long-term current use of insulin  -A1c 8.3 3/2023. Home reigmen lantus 15 units QHS  -Levemir 8 units QHS with sliding scale insulin and diabetic diet ordered while inpatient    Acute renal failure superimposed on stage 3 chronic kidney disease  -Cr 2.2 on presentation, baseline ~1.8. Suspect cardiorenal in setting of HF exacerbation  -Urine electrolytes ordered  -Monitor Cr and other electrolytes closely while diuresing    Debility  -PT/OT consult      Essential hypertension  -Pt. Markedly HTN on presentation, SBP >200  -Suspect HTN contributing to worsening of HF, home med lisinopril 5 mg only. Holding in setting of LOREN  -Will start amlodipine 10 mg (avoid B-blockers due to bradycardia on presentation). Hydralazine PO ordered PRN        VTE Risk Mitigation (From admission, onward)         Ordered     IP VTE HIGH RISK PATIENT  Once         04/28/23 1261                Discharge Planning   GABE:      Code Status: Full Code   Is the patient medically ready for discharge?:     Reason for patient still in hospital (select all that apply): Patient trending condition  Discharge Plan A: Home with family   Discharge Delays: None known at this time              Shavonne Westbrook MD  Department of Hospital Medicine   [facility] - [facility] Dept

## 2023-04-29 NOTE — ASSESSMENT & PLAN NOTE
Current rhythm: rate controlled AF  DCCV/Ablation:   [id number] 6 which confers 9.7% adjusted stroke risk  HAS-BLED 4  which confers 8.9 bleeds per 100 patient-years    - hold BB given bradycardia  - May be candidate for watchman, refer to Dr. Figueroa with EP clinic on discharge for outpatient evaluation   - Telemetry, Maintain K > 4, Mg > 2

## 2023-04-29 NOTE — ASSESSMENT & PLAN NOTE
Current rhythm: rate controlled AF  DCCV/Ablation:   [id number] 6 which confers 9.7% adjusted stroke risk  HAS-BLED which confers 8.9 bleeds per 100 patient-years  Home meds:     - consider starting low dose coreg for rate control  - Ideally would need OAC but would discuss risk and benefits of OAC with patient given elevated HAS-BLED score in setting or recent fall, and hx of GI bleeds. She likely has too high of bleeding risk for OAC but recommend primary team have further discussions with her regarding this  - she may be candidate for watchman, refer to EP on discharge   - Telemetry, Maintain K > 4, Mg > 2

## 2023-04-29 NOTE — ASSESSMENT & PLAN NOTE
-Pt. With edema, HANDY, and PND. , CXR with pulm edema and L sided effusion consistent with HF exacerbation  -Last TTE 11/2020 shows normal EF but inderteminate DD, repeat ordered  -HF pathway ordered with IV lasix 80 mg BID, fluid restricted diet, strict intake and output  -Monitor electrolytes cloesly while diresing  -Pt. Noted to be in Afib, seems to be new diagnosis as of last month, may be contributing to worsening HF, can discuss potential cardioversion with cards

## 2023-04-29 NOTE — PLAN OF CARE
Problem: Occupational Therapy  Goal: Occupational Therapy Goal  Description: Goals to be met by: 5/13/23     Patient will increase functional independence with ADLs by performing:    UE Dressing with Modified [place].  LE Dressing with Stand-by Assistance.  Grooming while EOB with Modified [place].  Toileting from bedside commode with Stand-by Assistance for hygiene and clothing management.   Toilet transfer to bedside commode with Contact Guard Assistance.    Outcome: Ongoing, Progressing

## 2023-04-30 PROBLEM — J18.9 PNEUMONIA DUE TO INFECTIOUS ORGANISM: Status: ACTIVE | Noted: 2023-04-30

## 2023-04-30 LAB
ANION GAP SERPL CALC-SCNC: 10 MMOL/L (ref 8–16)
BASOPHILS # BLD AUTO: 0.01 K/UL (ref 0–0.2)
BASOPHILS NFR BLD: 0.4 % (ref 0–1.9)
BUN SERPL-MCNC: 28 MG/DL (ref 8–23)
CALCIUM SERPL-MCNC: 8.5 MG/DL (ref 8.7–10.5)
CHLORIDE SERPL-SCNC: 101 MMOL/L (ref 95–110)
CO2 SERPL-SCNC: 25 MMOL/L (ref 23–29)
CREAT SERPL-MCNC: 2.5 MG/DL (ref 0.5–1.4)
DIFFERENTIAL METHOD: ABNORMAL
EOSINOPHIL # BLD AUTO: 0 K/UL (ref 0–0.5)
EOSINOPHIL NFR BLD: 1.7 % (ref 0–8)
ERYTHROCYTE [DISTWIDTH] IN BLOOD BY AUTOMATED COUNT: 16.4 % (ref 11.5–14.5)
EST. GFR  (NO RACE VARIABLE): 19 ML/MIN/1.73 M^2
GLUCOSE SERPL-MCNC: 306 MG/DL (ref 70–110)
HCT VFR BLD AUTO: 30.8 % (ref 37–48.5)
HGB BLD-MCNC: 9.1 G/DL (ref 12–16)
IMM GRANULOCYTES # BLD AUTO: 0 K/UL (ref 0–0.04)
IMM GRANULOCYTES NFR BLD AUTO: 0 % (ref 0–0.5)
LYMPHOCYTES # BLD AUTO: 0.6 K/UL (ref 1–4.8)
LYMPHOCYTES NFR BLD: 25.3 % (ref 18–48)
MCH RBC QN AUTO: 24.1 PG (ref 27–31)
MCHC RBC AUTO-ENTMCNC: 29.5 G/DL (ref 32–36)
MCV RBC AUTO: 82 FL (ref 82–98)
MONOCYTES # BLD AUTO: 0.3 K/UL (ref 0.3–1)
MONOCYTES NFR BLD: 11.4 % (ref 4–15)
NEUTROPHILS # BLD AUTO: 1.5 K/UL (ref 1.8–7.7)
NEUTROPHILS NFR BLD: 61.2 % (ref 38–73)
NRBC BLD-RTO: 0 /100 WBC
PLATELET # BLD AUTO: 162 K/UL (ref 150–450)
PMV BLD AUTO: 9.5 FL (ref 9.2–12.9)
POCT GLUCOSE: 182 MG/DL (ref 70–110)
POCT GLUCOSE: 230 MG/DL (ref 70–110)
POTASSIUM SERPL-SCNC: 4.5 MMOL/L (ref 3.5–5.1)
RBC # BLD AUTO: 3.78 M/UL (ref 4–5.4)
SODIUM SERPL-SCNC: 136 MMOL/L (ref 136–145)
WBC # BLD AUTO: 2.37 K/UL (ref 3.9–12.7)

## 2023-04-30 PROCEDURE — 99232 SBSQ HOSP IP/OBS MODERATE 35: CPT | Mod: HCNC,,, | Performed by: INTERNAL MEDICINE

## 2023-04-30 PROCEDURE — 96367 TX/PROPH/DG ADDL SEQ IV INF: CPT

## 2023-04-30 PROCEDURE — 85025 COMPLETE CBC W/AUTO DIFF WBC: CPT | Mod: HCNC | Performed by: STUDENT IN AN ORGANIZED HEALTH CARE EDUCATION/TRAINING PROGRAM

## 2023-04-30 PROCEDURE — 25000003 PHARM REV CODE 250: Mod: HCNC | Performed by: STUDENT IN AN ORGANIZED HEALTH CARE EDUCATION/TRAINING PROGRAM

## 2023-04-30 PROCEDURE — 99232 SBSQ HOSP IP/OBS MODERATE 35: CPT | Mod: HCNC,,, | Performed by: STUDENT IN AN ORGANIZED HEALTH CARE EDUCATION/TRAINING PROGRAM

## 2023-04-30 PROCEDURE — 25000003 PHARM REV CODE 250: Mod: HCNC | Performed by: HOSPITALIST

## 2023-04-30 PROCEDURE — 99232 PR SUBSEQUENT HOSPITAL CARE,LEVL II: ICD-10-PCS | Mod: HCNC,,, | Performed by: STUDENT IN AN ORGANIZED HEALTH CARE EDUCATION/TRAINING PROGRAM

## 2023-04-30 PROCEDURE — 63700000 PHARM REV CODE 250 ALT 637 W/O HCPCS: Mod: HCNC | Performed by: STUDENT IN AN ORGANIZED HEALTH CARE EDUCATION/TRAINING PROGRAM

## 2023-04-30 PROCEDURE — 94640 AIRWAY INHALATION TREATMENT: CPT | Mod: HCNC

## 2023-04-30 PROCEDURE — 63600175 PHARM REV CODE 636 W HCPCS: Mod: HCNC | Performed by: STUDENT IN AN ORGANIZED HEALTH CARE EDUCATION/TRAINING PROGRAM

## 2023-04-30 PROCEDURE — 25000242 PHARM REV CODE 250 ALT 637 W/ HCPCS: Mod: HCNC | Performed by: STUDENT IN AN ORGANIZED HEALTH CARE EDUCATION/TRAINING PROGRAM

## 2023-04-30 PROCEDURE — G0378 HOSPITAL OBSERVATION PER HR: HCPCS | Mod: HCNC

## 2023-04-30 PROCEDURE — 96372 THER/PROPH/DIAG INJ SC/IM: CPT | Performed by: STUDENT IN AN ORGANIZED HEALTH CARE EDUCATION/TRAINING PROGRAM

## 2023-04-30 PROCEDURE — 80048 BASIC METABOLIC PNL TOTAL CA: CPT | Mod: HCNC | Performed by: STUDENT IN AN ORGANIZED HEALTH CARE EDUCATION/TRAINING PROGRAM

## 2023-04-30 PROCEDURE — 94761 N-INVAS EAR/PLS OXIMETRY MLT: CPT | Mod: HCNC

## 2023-04-30 PROCEDURE — 99232 PR SUBSEQUENT HOSPITAL CARE,LEVL II: ICD-10-PCS | Mod: HCNC,,, | Performed by: INTERNAL MEDICINE

## 2023-04-30 PROCEDURE — 36415 COLL VENOUS BLD VENIPUNCTURE: CPT | Mod: HCNC | Performed by: STUDENT IN AN ORGANIZED HEALTH CARE EDUCATION/TRAINING PROGRAM

## 2023-04-30 RX ORDER — DEXTROSE 40 %
15 GEL (GRAM) ORAL
Status: DISCONTINUED | OUTPATIENT
Start: 2023-04-30 | End: 2023-05-05 | Stop reason: HOSPADM

## 2023-04-30 RX ORDER — INSULIN ASPART 100 [IU]/ML
0-5 INJECTION, SOLUTION INTRAVENOUS; SUBCUTANEOUS
Status: DISCONTINUED | OUTPATIENT
Start: 2023-04-30 | End: 2023-04-30

## 2023-04-30 RX ORDER — HYDROXYZINE HYDROCHLORIDE 25 MG/1
25 TABLET, FILM COATED ORAL ONCE
Status: COMPLETED | OUTPATIENT
Start: 2023-04-30 | End: 2023-05-01

## 2023-04-30 RX ORDER — IBUPROFEN 200 MG
16 TABLET ORAL
Status: DISCONTINUED | OUTPATIENT
Start: 2023-04-30 | End: 2023-04-30

## 2023-04-30 RX ORDER — INSULIN ASPART 100 [IU]/ML
0-5 INJECTION, SOLUTION INTRAVENOUS; SUBCUTANEOUS
Status: DISCONTINUED | OUTPATIENT
Start: 2023-04-30 | End: 2023-05-05 | Stop reason: HOSPADM

## 2023-04-30 RX ORDER — GLUCAGON 1 MG
1 KIT INJECTION
Status: DISCONTINUED | OUTPATIENT
Start: 2023-04-30 | End: 2023-05-05 | Stop reason: HOSPADM

## 2023-04-30 RX ORDER — IPRATROPIUM BROMIDE AND ALBUTEROL SULFATE 2.5; .5 MG/3ML; MG/3ML
3 SOLUTION RESPIRATORY (INHALATION)
Status: DISCONTINUED | OUTPATIENT
Start: 2023-04-30 | End: 2023-05-04

## 2023-04-30 RX ORDER — AZITHROMYCIN 250 MG/1
500 TABLET, FILM COATED ORAL DAILY
Status: DISCONTINUED | OUTPATIENT
Start: 2023-04-30 | End: 2023-05-05 | Stop reason: HOSPADM

## 2023-04-30 RX ORDER — IBUPROFEN 200 MG
24 TABLET ORAL
Status: DISCONTINUED | OUTPATIENT
Start: 2023-04-30 | End: 2023-04-30

## 2023-04-30 RX ORDER — FUROSEMIDE 40 MG/1
40 TABLET ORAL 2 TIMES DAILY
Status: DISCONTINUED | OUTPATIENT
Start: 2023-04-30 | End: 2023-05-02

## 2023-04-30 RX ORDER — INSULIN ASPART 100 [IU]/ML
3 INJECTION, SOLUTION INTRAVENOUS; SUBCUTANEOUS
Status: DISCONTINUED | OUTPATIENT
Start: 2023-04-30 | End: 2023-05-05 | Stop reason: HOSPADM

## 2023-04-30 RX ORDER — GLUCAGON 1 MG
1 KIT INJECTION
Status: DISCONTINUED | OUTPATIENT
Start: 2023-04-30 | End: 2023-04-30

## 2023-04-30 RX ORDER — DEXTROSE 40 %
30 GEL (GRAM) ORAL
Status: DISCONTINUED | OUTPATIENT
Start: 2023-04-30 | End: 2023-05-05 | Stop reason: HOSPADM

## 2023-04-30 RX ADMIN — AMLODIPINE BESYLATE 10 MG: 10 TABLET ORAL at 09:04

## 2023-04-30 RX ADMIN — IPRATROPIUM BROMIDE AND ALBUTEROL SULFATE 3 ML: 2.5; .5 SOLUTION RESPIRATORY (INHALATION) at 08:04

## 2023-04-30 RX ADMIN — INSULIN DETEMIR 8 UNITS: 100 INJECTION, SOLUTION SUBCUTANEOUS at 09:04

## 2023-04-30 RX ADMIN — SODIUM BICARBONATE 650 MG: 650 TABLET ORAL at 09:04

## 2023-04-30 RX ADMIN — INSULIN ASPART 2 UNITS: 100 INJECTION, SOLUTION INTRAVENOUS; SUBCUTANEOUS at 06:04

## 2023-04-30 RX ADMIN — ASPIRIN 81 MG CHEWABLE TABLET 81 MG: 81 TABLET CHEWABLE at 09:04

## 2023-04-30 RX ADMIN — ATORVASTATIN CALCIUM 40 MG: 40 TABLET, FILM COATED ORAL at 09:04

## 2023-04-30 RX ADMIN — CEFTRIAXONE 1 G: 1 INJECTION, POWDER, FOR SOLUTION INTRAMUSCULAR; INTRAVENOUS at 09:04

## 2023-04-30 RX ADMIN — FUROSEMIDE 40 MG: 40 TABLET ORAL at 12:04

## 2023-04-30 RX ADMIN — PANTOPRAZOLE SODIUM 40 MG: 40 TABLET, DELAYED RELEASE ORAL at 09:04

## 2023-04-30 RX ADMIN — INSULIN ASPART 3 UNITS: 100 INJECTION, SOLUTION INTRAVENOUS; SUBCUTANEOUS at 06:04

## 2023-04-30 RX ADMIN — AZITHROMYCIN MONOHYDRATE 500 MG: 250 TABLET ORAL at 09:04

## 2023-04-30 NOTE — SUBJECTIVE & OBJECTIVE
Interval History: HDS on RA. Net negative 2.8L since admit. Currently treating for community acquired PNA. Afib w/o RVR on tele. Continues to complain of SOB and diffuse myalgias.       Review of Systems   Constitutional: Positive for malaise/fatigue.   Cardiovascular:  Positive for leg swelling and orthopnea. Negative for chest pain, irregular heartbeat, near-syncope and palpitations.   Respiratory:  Positive for shortness of breath.    Objective:     Vital Signs (Most Recent):  Temp: 97.4 °F (36.3 °C) (04/30/23 0724)  Pulse: (!) 56 (04/30/23 0724)  Resp: (!) 21 (04/30/23 0724)  BP: 138/60 (04/30/23 0724)  SpO2: 96 % (04/30/23 0724) Vital Signs (24h Range):  Temp:  [94.4 °F (34.7 °C)-98.7 °F (37.1 °C)] 97.4 °F (36.3 °C)  Pulse:  [55-65] 56  Resp:  [18-21] 21  SpO2:  [96 %-100 %] 96 %  BP: (125-148)/(59-69) 138/60     Weight: 122.9 kg (270 lb 15.1 oz)  Body mass index is 42.44 kg/m².    SpO2: 96 %         Intake/Output Summary (Last 24 hours) at 4/30/2023 1008  Last data filed at 4/30/2023 0613  Gross per 24 hour   Intake 318 ml   Output 1250 ml   Net -932 ml         Lines/Drains/Airways       Peripheral Intravenous Line  Duration                  Peripheral IV - Single Lumen 04/28/23 2037 20 G Left;Posterior Forearm 1 day                    Physical Exam  Vitals reviewed.   Constitutional:       Appearance: She is obese.   HENT:      Head: Normocephalic and atraumatic.      Mouth/Throat:      Mouth: Mucous membranes are moist.   Eyes:      Conjunctiva/sclera: Conjunctivae normal.   Neck:      Comments: Difficult to assess JVD given body habitus  Cardiovascular:      Rate and Rhythm: Bradycardia present. Rhythm irregular.      Pulses: Normal pulses.   Pulmonary:      Effort: Pulmonary effort is normal. No respiratory distress.      Comments: Decreased breath sounds in R base, crackles noted bilaterally   Abdominal:      Palpations: Abdomen is soft.   Musculoskeletal:      Cervical back: Normal range of motion.       Right lower leg: Edema present.      Left lower leg: Edema present.      Comments: BLE wrapped   Skin:     Capillary Refill: Capillary refill takes less than 2 seconds.      Findings: No rash.   Neurological:      Mental Status: She is alert and oriented to person, place, and time.   Psychiatric:         Mood and Affect: Mood normal.       Significant Labs: All pertinent lab results from the last 24 hours have been reviewed.

## 2023-04-30 NOTE — ASSESSMENT & PLAN NOTE
-Pt. With edema, HANDY, and PND. , CXR with pulm edema and L sided effusion consistent with HF exacerbation  -HF pathway ordered  -Cardiology consulted. Rec PO lasix 40mg BID, will need to be discharged with lasix regimen, likely PO 40mg daily    - Fluid restriction at 1.5L/day  - Strict I/Os and daily standing weights  - Maintain on telemetry and daily EKGs  - Maintain Mag >2 & K+ >4  - SCDs, TEDs, Nursing communication to elevated LE, ambulate as tolerated

## 2023-04-30 NOTE — NURSING
Alert and oriented x 4. Speech is clear. No WOB or sign of distress. O2 at 2 liters. Purewick in place. Safety measures in place. Bed in low position. Call light in reach.

## 2023-04-30 NOTE — ASSESSMENT & PLAN NOTE
Current rhythm: rate controlled AF  Cardiology consulted.      - hold BB given bradycardia  - Discussed risk and benefits of Ac with patient. Does not want to be on AC given risk of fall and hx of GI bleed. May be candidate for watchman, refer to Dr. Figueroa with EP clinic on discharge for outpatient evaluation   - Telemetry, Maintain K > 4, Mg > 2

## 2023-04-30 NOTE — SUBJECTIVE & OBJECTIVE
Interval History: Patient reports some improvement in breathing since admission. Reports intermittent cough with no sputum production. No fever or chills.Empirically treating for CAP. Noted to be hypothermic yesterday. Temp improved with bear hugger. Resuming lasix 40 PO BID. Cardiology following      Review of Systems  Objective:     Vital Signs (Most Recent):  Temp: 97.4 °F (36.3 °C) (04/30/23 0724)  Pulse: (!) 56 (04/30/23 0724)  Resp: (!) 21 (04/30/23 0724)  BP: 138/60 (04/30/23 0724)  SpO2: 96 % (04/30/23 0724)   Vital Signs (24h Range):  Temp:  [94.4 °F (34.7 °C)-98 °F (36.7 °C)] 97.4 °F (36.3 °C)  Pulse:  [55-65] 56  Resp:  [18-21] 21  SpO2:  [96 %-98 %] 96 %  BP: (125-148)/(59-69) 138/60     Weight: 122.9 kg (270 lb 15.1 oz)  Body mass index is 42.44 kg/m².    Intake/Output Summary (Last 24 hours) at 4/30/2023 1147  Last data filed at 4/30/2023 0613  Gross per 24 hour   Intake 318 ml   Output 1250 ml   Net -932 ml        Physical Exam  Vitals reviewed.   Constitutional:       General: She is not in acute distress.     Appearance: She is well-developed. She is obese.   HENT:      Head: Normocephalic and atraumatic.   Eyes:      Extraocular Movements: Extraocular movements intact.      Pupils: Pupils are equal, round, and reactive to light.   Neck:      Vascular: No JVD.      Trachea: No tracheal deviation.   Cardiovascular:      Rate and Rhythm: Normal rate and regular rhythm.      Heart sounds: No murmur heard.    No friction rub. No gallop.   Pulmonary:      Effort: No respiratory distress.      Breath sounds: Normal breath sounds. No wheezing or rales.   Abdominal:      General: Bowel sounds are normal. There is no distension.      Palpations: Abdomen is soft. There is no mass.      Tenderness: There is no abdominal tenderness.   Musculoskeletal:         General: No deformity.      Cervical back: Neck supple.      Right lower leg: Edema present.      Left lower leg: Edema present.      Comments: 3+ edema  B/L   Lymphadenopathy:      Cervical: No cervical adenopathy.   Skin:     General: Skin is warm and dry.      Findings: No rash.   Neurological:      Mental Status: She is alert and oriented to person, place, and time.

## 2023-04-30 NOTE — PROGRESS NOTES
Therapy with Vancomycin complete and/or consult discontinued by provider.  Pharmacy will sign off, please re-consult as needed.    Thanks!   Johanna Goode, PharmD  04/30/2023 9:04 AM

## 2023-04-30 NOTE — PROGRESS NOTES
[facility] - [facility] ([place] [id number])  Cardiology  Progress Note    Patient Name: Sherin Ortiz  MRN: [id number]  Admission Date: 4/28/2023  Hospital Length of Stay: 0 days  Code Status: Full Code   Attending Physician: Shavonne Westbrook MD   Primary Care Physician: Darby Baum MD  Expected Discharge Date:   Principal Problem:Acute on chronic diastolic heart failure    Subjective:     Hospital Course:   No notes on file    Interval History: HDS on RA. Net negative 2.8L since admit. Currently treating for community acquired PNA. Afib w/o RVR on tele. Continues to complain of SOB and diffuse myalgias.       Review of Systems   Constitutional: Positive for malaise/fatigue.   Cardiovascular:  Positive for leg swelling and orthopnea. Negative for chest pain, irregular heartbeat, near-syncope and palpitations.   Respiratory:  Positive for shortness of breath.    Objective:     Vital Signs (Most Recent):  Temp: 97.4 °F (36.3 °C) (04/30/23 0724)  Pulse: (!) 56 (04/30/23 0724)  Resp: (!) 21 (04/30/23 0724)  BP: 138/60 (04/30/23 0724)  SpO2: 96 % (04/30/23 0724) Vital Signs (24h Range):  Temp:  [94.4 °F (34.7 °C)-98.7 °F (37.1 °C)] 97.4 °F (36.3 °C)  Pulse:  [55-65] 56  Resp:  [18-21] 21  SpO2:  [96 %-100 %] 96 %  BP: (125-148)/(59-69) 138/60     Weight: 122.9 kg (270 lb 15.1 oz)  Body mass index is 42.44 kg/m².    SpO2: 96 %         Intake/Output Summary (Last 24 hours) at 4/30/2023 1008  Last data filed at 4/30/2023 0613  Gross per 24 hour   Intake 318 ml   Output 1250 ml   Net -932 ml         Lines/Drains/Airways       Peripheral Intravenous Line  Duration                  Peripheral IV - Single Lumen 04/28/23 2037 20 G Left;Posterior Forearm 1 day                    Physical Exam  Vitals reviewed.   Constitutional:       Appearance: She is obese.   HENT:      Head: Normocephalic and atraumatic.      Mouth/Throat:      Mouth: Mucous membranes are moist.   Eyes:      Conjunctiva/sclera: Conjunctivae normal.   Neck:       Comments: Difficult to assess JVD given body habitus  Cardiovascular:      Rate and Rhythm: Bradycardia present. Rhythm irregular.      Pulses: Normal pulses.   Pulmonary:      Effort: Pulmonary effort is normal. No respiratory distress.      Comments: Decreased breath sounds in R base, crackles noted bilaterally   Abdominal:      Palpations: Abdomen is soft.   Musculoskeletal:      Cervical back: Normal range of motion.      Right lower leg: Edema present.      Left lower leg: Edema present.      Comments: BLE wrapped   Skin:     Capillary Refill: Capillary refill takes less than 2 seconds.      Findings: No rash.   Neurological:      Mental Status: She is alert and oriented to person, place, and time.   Psychiatric:         Mood and Affect: Mood normal.       Significant Labs: All pertinent lab results from the last 24 hours have been reviewed.        Assessment and Plan:       * Acute on chronic diastolic heart failure   Exam and labs consistent with acute decompensated heart failure, no current evidence of shock. TTE with EF 60%, CVP 8, JONE 55    Recommendations;  - PO lasix 40mg BID, will need to be discharged with lasix regimen, likely PO 40mg daily    - Fluid restriction at 1.5L/day  - Strict I/Os and daily standing weights  - Maintain on telemetry and daily EKGs  - Maintain Mag >2 & K+ >4  - SCDs, TEDs, Nursing communication to elevated LE, ambulate as tolerated    Atrial fibrillation  Current rhythm: rate controlled AF  DCCV/Ablation:   [id number] 6 which confers 9.7% adjusted stroke risk  HAS-BLED 4  which confers 8.9 bleeds per 100 patient-years    - hold BB given bradycardia  - May be candidate for watchman, refer to Dr. Figueroa with EP clinic on discharge for outpatient evaluation   - Telemetry, Maintain K > 4, Mg > 2      Thank you for your consult. Cardiology will sign off.     VTE Risk Mitigation (From admission, onward)         Ordered     IP VTE HIGH RISK PATIENT  Once         04/28/23 0344                 Nima Villeda MD  Cardiology  [facility] - [facility] ([place])

## 2023-04-30 NOTE — NURSING
Nurses Note -- 4 Eyes      4/29/2023   11:31 PM      Skin assessed during: Admit      [] No Altered Skin Integrity Present    []Prevention Measures Documented      [x] Yes- Altered Skin Integrity Present or Discovered   [] LDA Added if Not in Epic (Describe Wound)   [] New Altered Skin Integrity was Present on Admit and Documented in LDA   [] Wound Image Taken    Wound Care Consulted? Yes    Attending Nurse:  Mayur Corral RN     Second RN/Staff Member: Emily Mason RN

## 2023-04-30 NOTE — PROGRESS NOTES
[facility] - Intensive Care ([address] Medicine  Progress Note    Patient Name: Sherin Ortiz  MRN: [id number]  Patient Class: OP- Observation   Admission Date: 4/28/2023  Length of Stay: 0 days  Attending Physician: Shavonne Westbrook MD  Primary Care Provider: Darby Baum MD        Subjective:     Principal Problem:Acute on chronic diastolic heart failure        HPI:  77 yo F with PMHx CKD3, HFpEF, HTN, DM2, and HLD who presents to he ED complaining of progressively worsening SOB and weakness x 1 week. Pt. Reports noting decreased exercise tolerance and fatigue over the last week. She has had difficulty completing her ADL's around the house. Additionally she reports difficulty sleeping because she gets SOB when she lies flat. Today, the patient felt so SOB and unsteady on her feet that she came to the ED. She denies any cough, fevers, chills, chest pain, palpitations, nausea, or vomiting. No reported worsening edema (pt. Has venous stasis at baseline)      Overview/Hospital Course:  No notes on file    Interval History: Patient reports some improvement in breathing since admission. Reports intermittent cough with no sputum production. No fever or chills.Empirically treating for CAP. Noted to be hypothermic yesterday. Temp improved with bear hugger. Resuming lasix 40 PO BID. Cardiology following      Review of Systems  Objective:     Vital Signs (Most Recent):  Temp: 97.4 °F (36.3 °C) (04/30/23 0724)  Pulse: (!) 56 (04/30/23 0724)  Resp: (!) 21 (04/30/23 0724)  BP: 138/60 (04/30/23 0724)  SpO2: 96 % (04/30/23 0724)   Vital Signs (24h Range):  Temp:  [94.4 °F (34.7 °C)-98 °F (36.7 °C)] 97.4 °F (36.3 °C)  Pulse:  [55-65] 56  Resp:  [18-21] 21  SpO2:  [96 %-98 %] 96 %  BP: (125-148)/(59-69) 138/60     Weight: 122.9 kg (270 lb 15.1 oz)  Body mass index is 42.44 kg/m².    Intake/Output Summary (Last 24 hours) at 4/30/2023 1147  Last data filed at 4/30/2023 0613  Gross per 24 hour   Intake 318 ml   Output  1250 ml   Net -932 ml        Physical Exam  Vitals reviewed.   Constitutional:       General: She is not in acute distress.     Appearance: She is well-developed. She is obese.   HENT:      Head: Normocephalic and atraumatic.   Eyes:      Extraocular Movements: Extraocular movements intact.      Pupils: Pupils are equal, round, and reactive to light.   Neck:      Vascular: No JVD.      Trachea: No tracheal deviation.   Cardiovascular:      Rate and Rhythm: Normal rate and regular rhythm.      Heart sounds: No murmur heard.    No friction rub. No gallop.   Pulmonary:      Effort: No respiratory distress.      Breath sounds: Normal breath sounds. No wheezing or rales.   Abdominal:      General: Bowel sounds are normal. There is no distension.      Palpations: Abdomen is soft. There is no mass.      Tenderness: There is no abdominal tenderness.   Musculoskeletal:         General: No deformity.      Cervical back: Neck supple.      Right lower leg: Edema present.      Left lower leg: Edema present.      Comments: 3+ edema B/L   Lymphadenopathy:      Cervical: No cervical adenopathy.   Skin:     General: Skin is warm and dry.      Findings: No rash.   Neurological:      Mental Status: She is alert and oriented to person, place, and time.           Assessment/Plan:      * Acute on chronic diastolic heart failure  -Pt. With edema, HANDY, and PND. , CXR with pulm edema and L sided effusion consistent with HF exacerbation  -HF pathway ordered  -Cardiology consulted. Rec PO lasix 40mg BID, will need to be discharged with lasix regimen, likely PO 40mg daily    - Fluid restriction at 1.5L/day  - Strict I/Os and daily standing weights  - Maintain on telemetry and daily EKGs  - Maintain Mag >2 & K+ >4  - SCDs, TEDs, Nursing communication to elevated LE, ambulate as tolerated          Pneumonia due to infectious organism  CXR with possible LLL consolidation.   Empirically treating for CAP      Atrial fibrillation  Current  rhythm: rate controlled AF  Cardiology consulted.      - hold BB given bradycardia  - Discussed risk and benefits of Ac with patient. Does not want to be on AC given risk of fall and hx of GI bleed. May be candidate for watchman, refer to Dr. Figueroa with EP clinic on discharge for outpatient evaluation   - Telemetry, Maintain K > 4, Mg > 2      Type 2 diabetes mellitus, with long-term current use of insulin  -A1c 8.3 3/2023. Home reigmen lantus 15 units QHS  -Levemir 8 units QHS with sliding scale insulin and diabetic diet ordered while inpatient    Acute renal failure superimposed on stage 3 chronic kidney disease  -Cr 2.2 on presentation, baseline ~1.8. Suspect cardiorenal in setting of HF exacerbation  -Urine electrolytes ordered  -Monitor Cr and other electrolytes closely while diuresing    Debility  -PT/OT rec SNF       Essential hypertension  -Pt. Markedly HTN on presentation, SBP >200  -Suspect HTN contributing to worsening of HF, home med lisinopril 5 mg only. Holding in setting of LOREN  -Will start amlodipine 10 mg (avoid B-blockers due to bradycardia on presentation). Hydralazine PO ordered PRN        VTE Risk Mitigation (From admission, onward)         Ordered     IP VTE HIGH RISK PATIENT  Once         04/28/23 9477                Discharge Planning   GABE:      Code Status: Full Code   Is the patient medically ready for discharge?:     Reason for patient still in hospital (select all that apply): Patient trending condition  Discharge Plan A: Home with family   Discharge Delays: None known at this time              Shavonne Westbrook MD  Department of Hospital Medicine   [facility] - Intensive Care ([place])

## 2023-04-30 NOTE — PLAN OF CARE
Problem: Adult Inpatient Plan of Care  Goal: Plan of Care Review  Outcome: Ongoing, Progressing  Goal: Patient-Specific Goal (Individualized)  Outcome: Ongoing, Progressing  Goal: Absence of Hospital-Acquired Illness or Injury  Outcome: Ongoing, Progressing  Goal: Optimal Comfort and Wellbeing  Outcome: Ongoing, Progressing  Goal: Readiness for Transition of Care  Outcome: Ongoing, Progressing     Problem: Bariatric Environmental Safety  Goal: Safety Maintained with Care  Outcome: Ongoing, Progressing     Problem: Diabetes Comorbidity  Goal: Blood Glucose Level Within Targeted Range  Outcome: Ongoing, Progressing     Problem: Fluid and Electrolyte Imbalance (Acute Kidney Injury/Impairment)  Goal: Fluid and Electrolyte Balance  Outcome: Ongoing, Progressing     Problem: Oral Intake Inadequate (Acute Kidney Injury/Impairment)  Goal: Optimal Nutrition Intake  Outcome: Ongoing, Progressing     Problem: Renal Function Impairment (Acute Kidney Injury/Impairment)  Goal: Effective Renal Function  Outcome: Ongoing, Progressing     Problem: Impaired Wound Healing  Goal: Optimal Wound Healing  Outcome: Ongoing, Progressing     Problem: Skin Injury Risk Increased  Goal: Skin Health and Integrity  Outcome: Ongoing, Progressing

## 2023-04-30 NOTE — CLINICAL REVIEW
IP Sepsis Screen (most recent)       Sepsis Screen (IP) - 04/30/23 1219       Is the patient's history or complaint suggestive of a possible infection? Yes  -    Are there at least two of the following signs and symptoms present? Yes  -    Sepsis signs/symptoms - Tachypnea Tachypnea     >20  -    Sepsis signs/symptoms - WBC WBC < 4,000 or WBC > 12,000  -    Are any of the following organ dysfunction criteria present and not considered to be due to a chronic condition? Yes  -    Organ Dysfunction Criteria Creatinine > 2.0  -    Initiate Sepsis Protocol No  -JM    Reason sepsis not considered Pt. receiving appropriate management  -              User Key  (r) = Recorded By, (t) = Taken By, (c) = Cosigned By      Initials Name     Sada Delvalle RN

## 2023-04-30 NOTE — CONSULTS
Food & Nutrition  Education    Diet Education: Fluid and Salt restriction  Time Spent: 10 minutes  Learners: Patient      Nutrition Education provided with handouts: Heart Failure Nutrition Therapy      Comments: RD spoke with patient regarding heart failure nutrition therapy. We discussed limiting sodium in her diet to control buildup of fluids around the heart, stomach, lungs, and legs. Limiting fluid in the diet also helps because too much fluid can cause SOB, poor appetite, and weight gain from swelling or edema, which makes the heart work harder. RD explained the importance of reading the Nutrition Facts label, which will help determine the sodium content in 1 serving of a food. Select foods with <140 mg or less per serving. Avoid processed foods. Eat more fresh foods. If/when dining out use precaution restaurant meals can be very high in sodium. Lastly RD discussed weight monitoring to determine sudden weight changes. Patient agrees to review the material on her own, but no questions/concerns currently.      All questions and concerns answered. Dietitian's contact information provided.       Follow-Up: Yes      Please Re-consult as needed        Thanks!    Aliza Underwood Registration Eligible, Provisional LDN

## 2023-05-01 ENCOUNTER — EPISODE CHANGES (OUTPATIENT)
Dept: CARDIOLOGY | Facility: CLINIC | Age: 78
End: 2023-05-01

## 2023-05-01 DIAGNOSIS — I50.30 HEART FAILURE WITH PRESERVED EJECTION FRACTION, UNSPECIFIED HF CHRONICITY: Primary | ICD-10-CM

## 2023-05-01 LAB
ANION GAP SERPL CALC-SCNC: 7 MMOL/L (ref 8–16)
BASOPHILS # BLD AUTO: 0.02 K/UL (ref 0–0.2)
BASOPHILS NFR BLD: 0.5 % (ref 0–1.9)
BUN SERPL-MCNC: 33 MG/DL (ref 8–23)
CALCIUM SERPL-MCNC: 9 MG/DL (ref 8.7–10.5)
CHLORIDE SERPL-SCNC: 104 MMOL/L (ref 95–110)
CO2 SERPL-SCNC: 29 MMOL/L (ref 23–29)
CREAT SERPL-MCNC: 2.5 MG/DL (ref 0.5–1.4)
DIFFERENTIAL METHOD: ABNORMAL
EOSINOPHIL # BLD AUTO: 0.1 K/UL (ref 0–0.5)
EOSINOPHIL NFR BLD: 1.4 % (ref 0–8)
ERYTHROCYTE [DISTWIDTH] IN BLOOD BY AUTOMATED COUNT: 16.5 % (ref 11.5–14.5)
EST. GFR  (NO RACE VARIABLE): 19 ML/MIN/1.73 M^2
GLUCOSE SERPL-MCNC: 154 MG/DL (ref 70–110)
HCT VFR BLD AUTO: 31.8 % (ref 37–48.5)
HGB BLD-MCNC: 9.2 G/DL (ref 12–16)
IMM GRANULOCYTES # BLD AUTO: 0.01 K/UL (ref 0–0.04)
IMM GRANULOCYTES NFR BLD AUTO: 0.2 % (ref 0–0.5)
LEFT ABI: 1.21
LEFT ARM BP: 113 MMHG
LEFT DORSALIS PEDIS: 137 MMHG
LEFT POSTERIOR TIBIAL: 146 MMHG
LYMPHOCYTES # BLD AUTO: 0.9 K/UL (ref 1–4.8)
LYMPHOCYTES NFR BLD: 21.9 % (ref 18–48)
MCH RBC QN AUTO: 24.2 PG (ref 27–31)
MCHC RBC AUTO-ENTMCNC: 28.9 G/DL (ref 32–36)
MCV RBC AUTO: 84 FL (ref 82–98)
MONOCYTES # BLD AUTO: 0.4 K/UL (ref 0.3–1)
MONOCYTES NFR BLD: 9.5 % (ref 4–15)
NEUTROPHILS # BLD AUTO: 2.8 K/UL (ref 1.8–7.7)
NEUTROPHILS NFR BLD: 66.5 % (ref 38–73)
NRBC BLD-RTO: 0 /100 WBC
PLATELET # BLD AUTO: 182 K/UL (ref 150–450)
PMV BLD AUTO: 9.9 FL (ref 9.2–12.9)
POCT GLUCOSE: 147 MG/DL (ref 70–110)
POCT GLUCOSE: 173 MG/DL (ref 70–110)
POCT GLUCOSE: 184 MG/DL (ref 70–110)
POCT GLUCOSE: 188 MG/DL (ref 70–110)
POCT GLUCOSE: 193 MG/DL (ref 70–110)
POTASSIUM SERPL-SCNC: 4.4 MMOL/L (ref 3.5–5.1)
RBC # BLD AUTO: 3.8 M/UL (ref 4–5.4)
RIGHT ABI: 2.11
RIGHT ARM BP: 121 MMHG
RIGHT DORSALIS PEDIS: 255 MMHG
RIGHT POSTERIOR TIBIAL: 175 MMHG
SODIUM SERPL-SCNC: 140 MMOL/L (ref 136–145)
WBC # BLD AUTO: 4.2 K/UL (ref 3.9–12.7)

## 2023-05-01 PROCEDURE — 25000003 PHARM REV CODE 250: Mod: HCNC | Performed by: HOSPITALIST

## 2023-05-01 PROCEDURE — 94640 AIRWAY INHALATION TREATMENT: CPT | Mod: HCNC

## 2023-05-01 PROCEDURE — 25000242 PHARM REV CODE 250 ALT 637 W/ HCPCS: Mod: HCNC | Performed by: STUDENT IN AN ORGANIZED HEALTH CARE EDUCATION/TRAINING PROGRAM

## 2023-05-01 PROCEDURE — G0378 HOSPITAL OBSERVATION PER HR: HCPCS | Mod: HCNC

## 2023-05-01 PROCEDURE — 94761 N-INVAS EAR/PLS OXIMETRY MLT: CPT | Mod: HCNC

## 2023-05-01 PROCEDURE — 99900035 HC TECH TIME PER 15 MIN (STAT): Mod: HCNC

## 2023-05-01 PROCEDURE — 63600175 PHARM REV CODE 636 W HCPCS: Mod: HCNC | Performed by: STUDENT IN AN ORGANIZED HEALTH CARE EDUCATION/TRAINING PROGRAM

## 2023-05-01 PROCEDURE — 99232 SBSQ HOSP IP/OBS MODERATE 35: CPT | Mod: HCNC,,, | Performed by: STUDENT IN AN ORGANIZED HEALTH CARE EDUCATION/TRAINING PROGRAM

## 2023-05-01 PROCEDURE — 36415 COLL VENOUS BLD VENIPUNCTURE: CPT | Mod: HCNC | Performed by: STUDENT IN AN ORGANIZED HEALTH CARE EDUCATION/TRAINING PROGRAM

## 2023-05-01 PROCEDURE — 63700000 PHARM REV CODE 250 ALT 637 W/O HCPCS: Mod: HCNC | Performed by: STUDENT IN AN ORGANIZED HEALTH CARE EDUCATION/TRAINING PROGRAM

## 2023-05-01 PROCEDURE — 25000003 PHARM REV CODE 250: Mod: HCNC | Performed by: STUDENT IN AN ORGANIZED HEALTH CARE EDUCATION/TRAINING PROGRAM

## 2023-05-01 PROCEDURE — 85025 COMPLETE CBC W/AUTO DIFF WBC: CPT | Mod: HCNC | Performed by: STUDENT IN AN ORGANIZED HEALTH CARE EDUCATION/TRAINING PROGRAM

## 2023-05-01 PROCEDURE — 80048 BASIC METABOLIC PNL TOTAL CA: CPT | Mod: HCNC | Performed by: STUDENT IN AN ORGANIZED HEALTH CARE EDUCATION/TRAINING PROGRAM

## 2023-05-01 PROCEDURE — 96366 THER/PROPH/DIAG IV INF ADDON: CPT

## 2023-05-01 PROCEDURE — 99232 PR SUBSEQUENT HOSPITAL CARE,LEVL II: ICD-10-PCS | Mod: HCNC,,, | Performed by: STUDENT IN AN ORGANIZED HEALTH CARE EDUCATION/TRAINING PROGRAM

## 2023-05-01 RX ADMIN — SODIUM BICARBONATE 650 MG: 650 TABLET ORAL at 09:05

## 2023-05-01 RX ADMIN — PANTOPRAZOLE SODIUM 40 MG: 40 TABLET, DELAYED RELEASE ORAL at 09:05

## 2023-05-01 RX ADMIN — IPRATROPIUM BROMIDE AND ALBUTEROL SULFATE 3 ML: 2.5; .5 SOLUTION RESPIRATORY (INHALATION) at 08:05

## 2023-05-01 RX ADMIN — HYDROXYZINE HYDROCHLORIDE 25 MG: 25 TABLET, FILM COATED ORAL at 12:05

## 2023-05-01 RX ADMIN — FUROSEMIDE 40 MG: 40 TABLET ORAL at 09:05

## 2023-05-01 RX ADMIN — SODIUM BICARBONATE 650 MG: 650 TABLET ORAL at 08:05

## 2023-05-01 RX ADMIN — FUROSEMIDE 40 MG: 40 TABLET ORAL at 06:05

## 2023-05-01 RX ADMIN — ATORVASTATIN CALCIUM 40 MG: 40 TABLET, FILM COATED ORAL at 08:05

## 2023-05-01 RX ADMIN — INSULIN DETEMIR 8 UNITS: 100 INJECTION, SOLUTION SUBCUTANEOUS at 08:05

## 2023-05-01 RX ADMIN — INSULIN ASPART 3 UNITS: 100 INJECTION, SOLUTION INTRAVENOUS; SUBCUTANEOUS at 09:05

## 2023-05-01 RX ADMIN — ASPIRIN 81 MG CHEWABLE TABLET 81 MG: 81 TABLET CHEWABLE at 09:05

## 2023-05-01 RX ADMIN — AMLODIPINE BESYLATE 10 MG: 10 TABLET ORAL at 09:05

## 2023-05-01 RX ADMIN — AZITHROMYCIN MONOHYDRATE 500 MG: 250 TABLET ORAL at 09:05

## 2023-05-01 RX ADMIN — INSULIN ASPART 3 UNITS: 100 INJECTION, SOLUTION INTRAVENOUS; SUBCUTANEOUS at 01:05

## 2023-05-01 RX ADMIN — INSULIN ASPART 3 UNITS: 100 INJECTION, SOLUTION INTRAVENOUS; SUBCUTANEOUS at 06:05

## 2023-05-01 RX ADMIN — CEFTRIAXONE 1 G: 1 INJECTION, POWDER, FOR SOLUTION INTRAMUSCULAR; INTRAVENOUS at 09:05

## 2023-05-01 RX ADMIN — IPRATROPIUM BROMIDE AND ALBUTEROL SULFATE 3 ML: 2.5; .5 SOLUTION RESPIRATORY (INHALATION) at 09:05

## 2023-05-01 NOTE — PLAN OF CARE
[facility] - Intensive Care ([facility])  Discharge Reassessment    Primary Care Provider: Darby Baum MD    Expected Discharge Date: 5/3/2023    Reassessment (most recent)       Discharge Reassessment - 05/01/23 1356          Discharge Reassessment    Assessment Type Discharge Planning Reassessment (P)      Did the patient's condition or plan change since previous assessment? Yes (P)      Discharge Plan discussed with: Patient (P)      Communicated GABE with patient/caregiver Yes (P)      Discharge Plan A Skilled Nursing Facility (P)      Discharge Plan B Skilled Nursing Facility (P)                      Patient is needing Snf placement post dc. SW met with patient at bedside reporting OSNF was first preference. SW sent referrals and will continue to follow up .      Leelee Seals LMSW  [facility]   [phone]

## 2023-05-01 NOTE — NURSING
Patient is  AAO*4 . Had  Bladder movement , external catheter present . Call light within reach , safety precaution  maintained. Will continue monitoring.

## 2023-05-01 NOTE — PLAN OF CARE
Problem: Adult Inpatient Plan of Care  Goal: Plan of Care Review  Outcome: Ongoing, Progressing  Goal: Patient-Specific Goal (Individualized)  Outcome: Ongoing, Progressing  Goal: Absence of Hospital-Acquired Illness or Injury  Outcome: Ongoing, Progressing  Goal: Optimal Comfort and Wellbeing  Outcome: Ongoing, Progressing  Goal: Readiness for Transition of Care  Outcome: Ongoing, Progressing     Problem: Bariatric Environmental Safety  Goal: Safety Maintained with Care  Outcome: Ongoing, Progressing     Problem: Diabetes Comorbidity  Goal: Blood Glucose Level Within Targeted Range  Outcome: Ongoing, Progressing     Problem: Fluid and Electrolyte Imbalance (Acute Kidney Injury/Impairment)  Goal: Fluid and Electrolyte Balance  Outcome: Ongoing, Progressing     Problem: Oral Intake Inadequate (Acute Kidney Injury/Impairment)  Goal: Optimal Nutrition Intake  Outcome: Ongoing, Progressing     Problem: Renal Function Impairment (Acute Kidney Injury/Impairment)  Goal: Effective Renal Function  Outcome: Ongoing, Progressing     Problem: Impaired Wound Healing  Goal: Optimal Wound Healing  Outcome: Ongoing, Progressing     Problem: Skin Injury Risk Increased  Goal: Skin Health and Integrity  Outcome: Ongoing, Progressing     Problem: Fluid Imbalance (Pneumonia)  Goal: Fluid Balance  Outcome: Ongoing, Progressing     Problem: Infection (Pneumonia)  Goal: Resolution of Infection Signs and Symptoms  Outcome: Ongoing, Progressing     Problem: Respiratory Compromise (Pneumonia)  Goal: Effective Oxygenation and Ventilation  Outcome: Ongoing, Progressing

## 2023-05-01 NOTE — PT/OT/SLP PROGRESS
Occupational Therapy      Patient Name:  Sherin Ortiz   MRN:  [id number]    Patient not seen today secondary to  (GERMAN for US). Will follow-up as able.    5/1/2023

## 2023-05-01 NOTE — PROGRESS NOTES
[facility] - Intensive Care ([address] Medicine  Progress Note    Patient Name: Sherin Ortiz  MRN: [id number]  Patient Class: OP- Observation   Admission Date: 4/28/2023  Length of Stay: 0 days  Attending Physician: Shavonne Westbrook MD  Primary Care Provider: Darby Baum MD        Subjective:     Principal Problem:Acute on chronic diastolic heart failure        HPI:  77 yo F with PMHx CKD3, HFpEF, HTN, DM2, and HLD who presents to he ED complaining of progressively worsening SOB and weakness x 1 week. Pt. Reports noting decreased exercise tolerance and fatigue over the last week. She has had difficulty completing her ADL's around the house. Additionally she reports difficulty sleeping because she gets SOB when she lies flat. Today, the patient felt so SOB and unsteady on her feet that she came to the ED. She denies any cough, fevers, chills, chest pain, palpitations, nausea, or vomiting. No reported worsening edema (pt. Has venous stasis at baseline)      Overview/Hospital Course:  No notes on file    Interval History: NAEON. Reports improvement in SOB. UO neg 3.5 L. Continue lasix 40 PO BID. Awaiting placement in SNF    Review of Systems  Objective:     Vital Signs (Most Recent):  Temp: 97.8 °F (36.6 °C) (05/01/23 1135)  Pulse: (!) 57 (05/01/23 1135)  Resp: 19 (05/01/23 1135)  BP: (!) 100/54 (05/01/23 1135)  SpO2: 98 % (05/01/23 1135)   Vital Signs (24h Range):  Temp:  [97.3 °F (36.3 °C)-97.8 °F (36.6 °C)] 97.8 °F (36.6 °C)  Pulse:  [52-82] 57  Resp:  [16-21] 19  SpO2:  [91 %-99 %] 98 %  BP: (100-159)/(54-83) 100/54     Weight: 122.9 kg (270 lb 15.1 oz)  Body mass index is 42.44 kg/m².    Intake/Output Summary (Last 24 hours) at 5/1/2023 1352  Last data filed at 5/1/2023 0638  Gross per 24 hour   Intake 620 ml   Output 1525 ml   Net -905 ml        Physical Exam  Vitals reviewed.   Constitutional:       General: She is not in acute distress.     Appearance: She is well-developed. She is obese.    HENT:      Head: Normocephalic and atraumatic.   Eyes:      Extraocular Movements: Extraocular movements intact.      Pupils: Pupils are equal, round, and reactive to light.   Neck:      Vascular: No JVD.      Trachea: No tracheal deviation.   Cardiovascular:      Rate and Rhythm: Normal rate and regular rhythm.      Heart sounds: No murmur heard.    No friction rub. No gallop.   Pulmonary:      Effort: No respiratory distress.      Breath sounds: Normal breath sounds. No wheezing or rales.   Abdominal:      General: Bowel sounds are normal. There is no distension.      Palpations: Abdomen is soft. There is no mass.      Tenderness: There is no abdominal tenderness.   Musculoskeletal:         General: No deformity.      Cervical back: Neck supple.      Right lower leg: Edema present.      Left lower leg: Edema present.   Lymphadenopathy:      Cervical: No cervical adenopathy.   Skin:     General: Skin is warm and dry.      Findings: No rash.   Neurological:      Mental Status: She is alert and oriented to person, place, and time.           Assessment/Plan:      * Acute on chronic diastolic heart failure  -Pt. With edema, HANDY, and PND. , CXR with pulm edema and L sided effusion consistent with HF exacerbation  -HF pathway started   -Cardiology consulted. Signed off.   -  Rec PO lasix 40mg BID, will need to be discharged with lasix regimen, likely PO 40mg daily    - Fluid restriction at 1.5L/day  - Strict I/Os and daily standing weights  - Maintain on telemetry and daily EKGs  - Maintain Mag >2 & K+ >4  - SCDs, TEDs, Nursing communication to elevated LE, ambulate as tolerated          Pneumonia due to infectious organism  CXR with possible LLL consolidation.   Empirically treating for CAP      Atrial fibrillation  Current rhythm: rate controlled AF  Cardiology consulted.      - hold BB given bradycardia  - Discussed risk and benefits of Ac with patient. Does not want to be on AC given risk of fall and hx of  GI bleed. May be candidate for watchman, refer to Dr. Figueroa with EP clinic on discharge for outpatient evaluation   - Telemetry, Maintain K > 4, Mg > 2      Type 2 diabetes mellitus, with long-term current use of insulin  -A1c 8.3 3/2023. Home reigmen lantus 15 units QHS  -Levemir 8 units QHS with sliding scale insulin and diabetic diet ordered while inpatient    Acute renal failure superimposed on stage 3 chronic kidney disease  -Cr 2.2 on presentation, baseline ~1.8. Suspect cardiorenal in setting of HF exacerbation  -Urine electrolytes ordered  -Monitor Cr and other electrolytes closely while diuresing    Debility  -PT/OT consult      Essential hypertension  -Pt. Markedly HTN on presentation, SBP >200  -Suspect HTN contributing to worsening of HF, home med lisinopril 5 mg only. Holding in setting of LOREN  -Will start amlodipine 10 mg (avoid B-blockers due to bradycardia on presentation). Hydralazine PO ordered PRN        VTE Risk Mitigation (From admission, onward)         Ordered     IP VTE HIGH RISK PATIENT  Once         04/28/23 2238                Discharge Planning   GABE: 5/3/2023     Code Status: Full Code   Is the patient medically ready for discharge?: No    Reason for patient still in hospital (select all that apply): Pending disposition  Discharge Plan A: Home with family   Discharge Delays: None known at this time              Shavonne Westbrook MD  Department of Hospital Medicine   [facility] - Intensive Care ([facility]

## 2023-05-01 NOTE — PT/OT/SLP PROGRESS
Physical Therapy    Update    Sherin Ortiz   MRN: [id number]    Attempted to see patient at ~1415 this afternoon for PT treatment; however, she being prepared to transfer to vascular to ankle brachial ultrasound. Will follow-up for PT as scheduled, no billable units today.    Maximino Boogie, PT  5/1/2023

## 2023-05-01 NOTE — SUBJECTIVE & OBJECTIVE
Interval History: NAEON. Reports improvement in SOB. UO neg 3.5 L. Continue lasix 40 PO BID. Awaiting placement in SNF    Review of Systems  Objective:     Vital Signs (Most Recent):  Temp: 97.8 °F (36.6 °C) (05/01/23 1135)  Pulse: (!) 57 (05/01/23 1135)  Resp: 19 (05/01/23 1135)  BP: (!) 100/54 (05/01/23 1135)  SpO2: 98 % (05/01/23 1135)   Vital Signs (24h Range):  Temp:  [97.3 °F (36.3 °C)-97.8 °F (36.6 °C)] 97.8 °F (36.6 °C)  Pulse:  [52-82] 57  Resp:  [16-21] 19  SpO2:  [91 %-99 %] 98 %  BP: (100-159)/(54-83) 100/54     Weight: 122.9 kg (270 lb 15.1 oz)  Body mass index is 42.44 kg/m².    Intake/Output Summary (Last 24 hours) at 5/1/2023 1352  Last data filed at 5/1/2023 0638  Gross per 24 hour   Intake 620 ml   Output 1525 ml   Net -905 ml        Physical Exam  Vitals reviewed.   Constitutional:       General: She is not in acute distress.     Appearance: She is well-developed. She is obese.   HENT:      Head: Normocephalic and atraumatic.   Eyes:      Extraocular Movements: Extraocular movements intact.      Pupils: Pupils are equal, round, and reactive to light.   Neck:      Vascular: No JVD.      Trachea: No tracheal deviation.   Cardiovascular:      Rate and Rhythm: Normal rate and regular rhythm.      Heart sounds: No murmur heard.    No friction rub. No gallop.   Pulmonary:      Effort: No respiratory distress.      Breath sounds: Normal breath sounds. No wheezing or rales.   Abdominal:      General: Bowel sounds are normal. There is no distension.      Palpations: Abdomen is soft. There is no mass.      Tenderness: There is no abdominal tenderness.   Musculoskeletal:         General: No deformity.      Cervical back: Neck supple.      Right lower leg: Edema present.      Left lower leg: Edema present.   Lymphadenopathy:      Cervical: No cervical adenopathy.   Skin:     General: Skin is warm and dry.      Findings: No rash.   Neurological:      Mental Status: She is alert and oriented to person, place,  and time.

## 2023-05-01 NOTE — PROGRESS NOTES
RN speaking with pt and gave half of hospital education r/t [facility]. Pt family member informed RN pt may be discharging to a rehab facility. RN notified [facility] PA-C who agreed to monitor pt discharge disposition for appropriate future plan.

## 2023-05-01 NOTE — ASSESSMENT & PLAN NOTE
-Pt. With edema, HANDY, and PND. , CXR with pulm edema and L sided effusion consistent with HF exacerbation  -HF pathway started   -Cardiology consulted. Signed off.   -  Rec PO lasix 40mg BID, will need to be discharged with lasix regimen, likely PO 40mg daily    - Fluid restriction at 1.5L/day  - Strict I/Os and daily standing weights  - Maintain on telemetry and daily EKGs  - Maintain Mag >2 & K+ >4  - SCDs, TEDs, Nursing communication to elevated LE, ambulate as tolerated

## 2023-05-01 NOTE — PLAN OF CARE
05/01/23 1358   Post-Acute Status   Post-Acute Authorization Placement   Post-Acute Placement Status Referrals Sent       SNF referrals sent via [facility]. SW will continue to follow up.        Leelee Seals LMSW  [facility]   [phone]

## 2023-05-02 LAB
ANION GAP SERPL CALC-SCNC: 7 MMOL/L (ref 8–16)
BASOPHILS # BLD AUTO: 0.01 K/UL (ref 0–0.2)
BASOPHILS NFR BLD: 0.2 % (ref 0–1.9)
BUN SERPL-MCNC: 33 MG/DL (ref 8–23)
CALCIUM SERPL-MCNC: 8.6 MG/DL (ref 8.7–10.5)
CHLORIDE SERPL-SCNC: 106 MMOL/L (ref 95–110)
CO2 SERPL-SCNC: 26 MMOL/L (ref 23–29)
CREAT SERPL-MCNC: 2.6 MG/DL (ref 0.5–1.4)
DIFFERENTIAL METHOD: ABNORMAL
EOSINOPHIL # BLD AUTO: 0.1 K/UL (ref 0–0.5)
EOSINOPHIL NFR BLD: 1.4 % (ref 0–8)
ERYTHROCYTE [DISTWIDTH] IN BLOOD BY AUTOMATED COUNT: 16.6 % (ref 11.5–14.5)
EST. GFR  (NO RACE VARIABLE): 18.1 ML/MIN/1.73 M^2
GLUCOSE SERPL-MCNC: 165 MG/DL (ref 70–110)
HCT VFR BLD AUTO: 29.9 % (ref 37–48.5)
HGB BLD-MCNC: 8.9 G/DL (ref 12–16)
IMM GRANULOCYTES # BLD AUTO: 0.01 K/UL (ref 0–0.04)
IMM GRANULOCYTES NFR BLD AUTO: 0.2 % (ref 0–0.5)
LYMPHOCYTES # BLD AUTO: 0.7 K/UL (ref 1–4.8)
LYMPHOCYTES NFR BLD: 17.5 % (ref 18–48)
MCH RBC QN AUTO: 24.5 PG (ref 27–31)
MCHC RBC AUTO-ENTMCNC: 29.8 G/DL (ref 32–36)
MCV RBC AUTO: 82 FL (ref 82–98)
MONOCYTES # BLD AUTO: 0.5 K/UL (ref 0.3–1)
MONOCYTES NFR BLD: 13 % (ref 4–15)
NEUTROPHILS # BLD AUTO: 2.8 K/UL (ref 1.8–7.7)
NEUTROPHILS NFR BLD: 67.7 % (ref 38–73)
NRBC BLD-RTO: 0 /100 WBC
PLATELET # BLD AUTO: 166 K/UL (ref 150–450)
PMV BLD AUTO: 10.4 FL (ref 9.2–12.9)
POCT GLUCOSE: 118 MG/DL (ref 70–110)
POCT GLUCOSE: 139 MG/DL (ref 70–110)
POCT GLUCOSE: 167 MG/DL (ref 70–110)
POCT GLUCOSE: 201 MG/DL (ref 70–110)
POTASSIUM SERPL-SCNC: 4.5 MMOL/L (ref 3.5–5.1)
RBC # BLD AUTO: 3.63 M/UL (ref 4–5.4)
SARS-COV-2 RNA RESP QL NAA+PROBE: NOT DETECTED
SODIUM SERPL-SCNC: 139 MMOL/L (ref 136–145)
WBC # BLD AUTO: 4.16 K/UL (ref 3.9–12.7)

## 2023-05-02 PROCEDURE — 97530 THERAPEUTIC ACTIVITIES: CPT | Mod: HCNC

## 2023-05-02 PROCEDURE — 85025 COMPLETE CBC W/AUTO DIFF WBC: CPT | Performed by: STUDENT IN AN ORGANIZED HEALTH CARE EDUCATION/TRAINING PROGRAM

## 2023-05-02 PROCEDURE — 94640 AIRWAY INHALATION TREATMENT: CPT

## 2023-05-02 PROCEDURE — 63700000 PHARM REV CODE 250 ALT 637 W/O HCPCS: Mod: HCNC | Performed by: STUDENT IN AN ORGANIZED HEALTH CARE EDUCATION/TRAINING PROGRAM

## 2023-05-02 PROCEDURE — 36415 COLL VENOUS BLD VENIPUNCTURE: CPT | Performed by: STUDENT IN AN ORGANIZED HEALTH CARE EDUCATION/TRAINING PROGRAM

## 2023-05-02 PROCEDURE — 94761 N-INVAS EAR/PLS OXIMETRY MLT: CPT | Mod: HCNC

## 2023-05-02 PROCEDURE — U0005 INFEC AGEN DETEC AMPLI PROBE: HCPCS | Performed by: STUDENT IN AN ORGANIZED HEALTH CARE EDUCATION/TRAINING PROGRAM

## 2023-05-02 PROCEDURE — 99232 SBSQ HOSP IP/OBS MODERATE 35: CPT | Mod: HCNC,,, | Performed by: STUDENT IN AN ORGANIZED HEALTH CARE EDUCATION/TRAINING PROGRAM

## 2023-05-02 PROCEDURE — 20600001 HC STEP DOWN PRIVATE ROOM

## 2023-05-02 PROCEDURE — 99232 PR SUBSEQUENT HOSPITAL CARE,LEVL II: ICD-10-PCS | Mod: HCNC,,, | Performed by: STUDENT IN AN ORGANIZED HEALTH CARE EDUCATION/TRAINING PROGRAM

## 2023-05-02 PROCEDURE — 96366 THER/PROPH/DIAG IV INF ADDON: CPT

## 2023-05-02 PROCEDURE — 30200315 PPD INTRADERMAL TEST REV CODE 302: Performed by: STUDENT IN AN ORGANIZED HEALTH CARE EDUCATION/TRAINING PROGRAM

## 2023-05-02 PROCEDURE — 86580 TB INTRADERMAL TEST: CPT | Performed by: STUDENT IN AN ORGANIZED HEALTH CARE EDUCATION/TRAINING PROGRAM

## 2023-05-02 PROCEDURE — 25000242 PHARM REV CODE 250 ALT 637 W/ HCPCS: Performed by: STUDENT IN AN ORGANIZED HEALTH CARE EDUCATION/TRAINING PROGRAM

## 2023-05-02 PROCEDURE — 80048 BASIC METABOLIC PNL TOTAL CA: CPT | Performed by: STUDENT IN AN ORGANIZED HEALTH CARE EDUCATION/TRAINING PROGRAM

## 2023-05-02 PROCEDURE — 63600175 PHARM REV CODE 636 W HCPCS: Performed by: STUDENT IN AN ORGANIZED HEALTH CARE EDUCATION/TRAINING PROGRAM

## 2023-05-02 PROCEDURE — 25000003 PHARM REV CODE 250: Mod: HCNC | Performed by: HOSPITALIST

## 2023-05-02 PROCEDURE — 25000003 PHARM REV CODE 250: Mod: HCNC | Performed by: STUDENT IN AN ORGANIZED HEALTH CARE EDUCATION/TRAINING PROGRAM

## 2023-05-02 RX ORDER — INSULIN GLARGINE 100 [IU]/ML
15 INJECTION, SOLUTION SUBCUTANEOUS NIGHTLY
Qty: 13.5 ML | Refills: 3 | Status: ON HOLD | OUTPATIENT
Start: 2023-05-02 | End: 2023-06-06 | Stop reason: HOSPADM

## 2023-05-02 RX ORDER — CEFDINIR 300 MG/1
300 CAPSULE ORAL EVERY 12 HOURS
Qty: 8 CAPSULE | Refills: 0
Start: 2023-05-02 | End: 2023-05-05 | Stop reason: HOSPADM

## 2023-05-02 RX ORDER — AMLODIPINE BESYLATE 10 MG/1
10 TABLET ORAL DAILY
Qty: 30 TABLET | Refills: 11 | Status: ON HOLD
Start: 2023-05-03 | End: 2023-06-06 | Stop reason: HOSPADM

## 2023-05-02 RX ADMIN — TUBERCULIN PURIFIED PROTEIN DERIVATIVE 5 UNITS: 5 INJECTION, SOLUTION INTRADERMAL at 02:05

## 2023-05-02 RX ADMIN — AMLODIPINE BESYLATE 10 MG: 10 TABLET ORAL at 09:05

## 2023-05-02 RX ADMIN — SODIUM CHLORIDE, POTASSIUM CHLORIDE, SODIUM LACTATE AND CALCIUM CHLORIDE 250 ML: 600; 310; 30; 20 INJECTION, SOLUTION INTRAVENOUS at 01:05

## 2023-05-02 RX ADMIN — ATORVASTATIN CALCIUM 40 MG: 40 TABLET, FILM COATED ORAL at 09:05

## 2023-05-02 RX ADMIN — SODIUM BICARBONATE 650 MG: 650 TABLET ORAL at 09:05

## 2023-05-02 RX ADMIN — IPRATROPIUM BROMIDE AND ALBUTEROL SULFATE 3 ML: 2.5; .5 SOLUTION RESPIRATORY (INHALATION) at 07:05

## 2023-05-02 RX ADMIN — INSULIN DETEMIR 8 UNITS: 100 INJECTION, SOLUTION SUBCUTANEOUS at 08:05

## 2023-05-02 RX ADMIN — PANTOPRAZOLE SODIUM 40 MG: 40 TABLET, DELAYED RELEASE ORAL at 09:05

## 2023-05-02 RX ADMIN — FUROSEMIDE 40 MG: 40 TABLET ORAL at 09:05

## 2023-05-02 RX ADMIN — ASPIRIN 81 MG CHEWABLE TABLET 81 MG: 81 TABLET CHEWABLE at 09:05

## 2023-05-02 RX ADMIN — IPRATROPIUM BROMIDE AND ALBUTEROL SULFATE 3 ML: 2.5; .5 SOLUTION RESPIRATORY (INHALATION) at 02:05

## 2023-05-02 RX ADMIN — INSULIN ASPART 3 UNITS: 100 INJECTION, SOLUTION INTRAVENOUS; SUBCUTANEOUS at 09:05

## 2023-05-02 RX ADMIN — AZITHROMYCIN MONOHYDRATE 500 MG: 250 TABLET ORAL at 09:05

## 2023-05-02 RX ADMIN — INSULIN ASPART 3 UNITS: 100 INJECTION, SOLUTION INTRAVENOUS; SUBCUTANEOUS at 05:05

## 2023-05-02 RX ADMIN — INSULIN ASPART 2 UNITS: 100 INJECTION, SOLUTION INTRAVENOUS; SUBCUTANEOUS at 01:05

## 2023-05-02 RX ADMIN — IPRATROPIUM BROMIDE AND ALBUTEROL SULFATE 3 ML: 2.5; .5 SOLUTION RESPIRATORY (INHALATION) at 08:05

## 2023-05-02 RX ADMIN — INSULIN ASPART 3 UNITS: 100 INJECTION, SOLUTION INTRAVENOUS; SUBCUTANEOUS at 01:05

## 2023-05-02 RX ADMIN — CEFTRIAXONE 1 G: 1 INJECTION, POWDER, FOR SOLUTION INTRAMUSCULAR; INTRAVENOUS at 09:05

## 2023-05-02 NOTE — SUBJECTIVE & OBJECTIVE
Interval History:   Patient's renal function continues to worsen today despite being in the hospital now >2days. This rise in creatinine is notably above her baseline of around 1.8. Accordingly we will be holding diuretics, giving some ivf appropriate for her concurrent HF in recent exacerbation and monitor to see how she does tomorrow     Review of Systems  Objective:     Vital Signs (Most Recent):  Temp: 97.9 °F (36.6 °C) (05/02/23 1203)  Pulse: (!) 57 (05/02/23 1203)  Resp: 18 (05/02/23 1203)  BP: 133/63 (05/02/23 1203)  SpO2: 100 % (05/02/23 1203)   Vital Signs (24h Range):  Temp:  [97.3 °F (36.3 °C)-97.9 °F (36.6 °C)] 97.9 °F (36.6 °C)  Pulse:  [57-67] 57  Resp:  [18-19] 18  SpO2:  [92 %-100 %] 100 %  BP: (117-146)/(53-64) 133/63     Weight: 122.9 kg (270 lb 15.1 oz)  Body mass index is 42.44 kg/m².    Intake/Output Summary (Last 24 hours) at 5/2/2023 1323  Last data filed at 5/2/2023 0622  Gross per 24 hour   Intake 500 ml   Output 1050 ml   Net -550 ml      Physical Exam  Vitals reviewed.   Constitutional:       General: She is not in acute distress.     Appearance: She is well-developed. She is obese.   HENT:      Head: Normocephalic and atraumatic.   Eyes:      Extraocular Movements: Extraocular movements intact.      Pupils: Pupils are equal, round, and reactive to light.   Neck:      Vascular: No JVD.      Trachea: No tracheal deviation.   Cardiovascular:      Rate and Rhythm: Normal rate and regular rhythm.      Heart sounds: No murmur heard.    No friction rub. No gallop.   Pulmonary:      Effort: No respiratory distress.      Breath sounds: Normal breath sounds. No wheezing or rales.   Abdominal:      General: Bowel sounds are normal. There is no distension.      Palpations: Abdomen is soft. There is no mass.      Tenderness: There is no abdominal tenderness.   Musculoskeletal:         General: No deformity.      Cervical back: Neck supple.      Right lower leg: No edema.      Left lower leg: No edema.    Lymphadenopathy:      Cervical: No cervical adenopathy.   Skin:     General: Skin is warm and dry.      Findings: No rash.   Neurological:      Mental Status: She is alert and oriented to person, place, and time.

## 2023-05-02 NOTE — PROGRESS NOTES
[facility] - Intensive Care ([address] Medicine  Progress Note    Patient Name: Sherin Ortiz  MRN: [id number]  Patient Class: OP- Observation   Admission Date: 4/28/2023  Length of Stay: 0 days  Attending Physician: Shavonne Westbrook MD  Primary Care Provider: Darby Baum MD        Subjective:     Principal Problem:Acute on chronic diastolic heart failure        HPI:  77 yo F with PMHx CKD3, HFpEF, HTN, DM2, and HLD who presents to he ED complaining of progressively worsening SOB and weakness x 1 week. Pt. Reports noting decreased exercise tolerance and fatigue over the last week. She has had difficulty completing her ADL's around the house. Additionally she reports difficulty sleeping because she gets SOB when she lies flat. Today, the patient felt so SOB and unsteady on her feet that she came to the ED. She denies any cough, fevers, chills, chest pain, palpitations, nausea, or vomiting. No reported worsening edema (pt. Has venous stasis at baseline)      Overview/Hospital Course:  No notes on file    Interval History:   Patient's renal function continues to worsen today despite being in the hospital now >2days. This rise in creatinine is notably above her baseline of around 1.8. Accordingly we will be holding diuretics, giving some ivf appropriate for her concurrent HF in recent exacerbation and monitor to see how she does tomorrow    PT/OT rec SNF. Patient reports using a powered Wc at home prior to this hospitalization      Review of Systems  Objective:     Vital Signs (Most Recent):  Temp: 97.9 °F (36.6 °C) (05/02/23 1203)  Pulse: (!) 57 (05/02/23 1203)  Resp: 18 (05/02/23 1203)  BP: 133/63 (05/02/23 1203)  SpO2: 100 % (05/02/23 1203)   Vital Signs (24h Range):  Temp:  [97.3 °F (36.3 °C)-97.9 °F (36.6 °C)] 97.9 °F (36.6 °C)  Pulse:  [57-67] 57  Resp:  [18-19] 18  SpO2:  [92 %-100 %] 100 %  BP: (117-146)/(53-64) 133/63     Weight: 122.9 kg (270 lb 15.1 oz)  Body mass index is 42.44  kg/m².    Intake/Output Summary (Last 24 hours) at 5/2/2023 1323  Last data filed at 5/2/2023 0622  Gross per 24 hour   Intake 500 ml   Output 1050 ml   Net -550 ml      Physical Exam  Vitals reviewed.   Constitutional:       General: She is not in acute distress.     Appearance: She is well-developed. She is obese.   HENT:      Head: Normocephalic and atraumatic.   Eyes:      Extraocular Movements: Extraocular movements intact.      Pupils: Pupils are equal, round, and reactive to light.   Neck:      Vascular: No JVD.      Trachea: No tracheal deviation.   Cardiovascular:      Rate and Rhythm: Normal rate and regular rhythm.      Heart sounds: No murmur heard.    No friction rub. No gallop.   Pulmonary:      Effort: No respiratory distress.      Breath sounds: Normal breath sounds. No wheezing or rales.   Abdominal:      General: Bowel sounds are normal. There is no distension.      Palpations: Abdomen is soft. There is no mass.      Tenderness: There is no abdominal tenderness.   Musculoskeletal:         General: No deformity.      Cervical back: Neck supple.      Right lower leg: No edema.      Left lower leg: No edema.   Lymphadenopathy:      Cervical: No cervical adenopathy.   Skin:     General: Skin is warm and dry.      Findings: No rash.   Neurological:      Mental Status: She is alert and oriented to person, place, and time.         Assessment/Plan:      * Acute on chronic diastolic heart failure  -Pt. With edema, HANDY, and PND. , CXR with pulm edema and L sided effusion consistent with HF exacerbation  -HF pathway started   -Cardiology consulted. Signed off.   - Received lasix 40mg BID. Will hold further diuresis given LOREN   - Fluid restriction at 1.5L/day  - Strict I/Os and daily standing weights  - Maintain on telemetry and daily EKGs  - Maintain Mag >2 & K+ >4  - SCDs, TEDs, Nursing communication to elevated LE, ambulate as tolerated      Pneumonia due to infectious organism  CXR with possible LLL  consolidation.   Empirically treating for CAP      Atrial fibrillation  Current rhythm: rate controlled AF  Cardiology consulted. Signed off. Recs as below     - hold BB given bradycardia  - Discussed risk and benefits of Ac with patient. Does not want to be on AC given risk of fall and hx of GI bleed. May be candidate for watchman, refer to Dr. Figueroa with EP clinic on discharge for outpatient evaluation   - Telemetry, Maintain K > 4, Mg > 2      Type 2 diabetes mellitus, with long-term current use of insulin  -A1c 8.3 3/2023. Home reigmen lantus 15 units QHS  -Levemir 8 units QHS with sliding scale insulin and diabetic diet ordered while inpatient    Acute renal failure superimposed on stage 3 chronic kidney disease  Patient's renal function continues to worsen today despite being in the hospital now >2days. This rise in creatinine is notably above her baseline of around 1.8. Accordingly we will be holding diuretics, giving some ivf appropriate for her concurrent HF in recent exacerbation and monitor to see how she does tomorrow    Debility  -PT/OT consulted. Rec placement in SNF      Essential hypertension  -Pt. Markedly HTN on presentation, SBP >200  -Suspect HTN contributing to worsening of HF, home med lisinopril 5 mg only. Holding in setting of LOREN  -Will start amlodipine 10 mg (avoid B-blockers due to bradycardia on presentation). Hydralazine PO ordered PRN        VTE Risk Mitigation (From admission, onward)           Ordered     IP VTE HIGH RISK PATIENT  Once         04/28/23 2238                    Discharge Planning   GABE: 5/3/2023     Code Status: Full Code   Is the patient medically ready for discharge?: Yes    Reason for patient still in hospital (select all that apply): Patient trending condition  Discharge Plan A: Skilled Nursing Facility   Discharge Delays: None known at this time              Shavonne Westbrook MD  Department of Hospital Medicine   [facility] - Intensive Care ([place] [facility]

## 2023-05-02 NOTE — PT/OT/SLP PROGRESS
Physical Therapy Treatment    Patient Name:  Sherin Ortiz   MRN:  [id number]  Admitting Diagnosis: Acute on chronic diastolic heart failure  Recent Surgery: * No surgery found *      Recommendations:     Discharge Recommendations:  nursing facility, skilled   Discharge Equipment Recommendations: to be determined by next level of care   Barriers to discharge: None    Highest Level of Mobility: bed mobility   Assistance Needed: maximum assistance     Assessment:     Sherin Ortiz is a 78 y.o. female admitted with a medical diagnosis of Acute on chronic diastolic heart failure. Patient tolerated PT treatment well today. She  is most limited today by weakness.  She was able to practice bed mobility and sitting tolerance - mobility was limited by lethargy; unable to stand due to safety. Focus of treatment was improving overall mobility. Pt is progressing well. See detailed treatment note below:    Problem List: weakness, impaired endurance, impaired self care skills, impaired functional mobility, impaired balance, decreased coordination, decreased upper extremity function, decreased lower extremity function, decreased safety awareness, pain, decreased ROM, impaired skin, edema. weakness, impaired endurance, impaired self care skills, impaired functional mobility, impaired balance, decreased coordination, decreased upper extremity function, decreased lower extremity function, decreased safety awareness, pain, decreased ROM, impaired skin, edema  Rehab Prognosis: Good     GOALS:   Multidisciplinary Problems       Physical Therapy Goals          Problem: Physical Therapy    Goal Priority Disciplines Outcome Goal Variances Interventions   Physical Therapy Goal     PT, PT/OT Ongoing, Progressing     Description: PT goals to be met by: 5/19/23    Patient will perform rolling each way with supervision.  Patient will perform supine <> sitting with supervision.  Patient will perform sit <> stand transitions with supervision  "using RW.  Patient will perform transfers from bed <> chair or BSC with supervision using RW.                     Plan:     During this hospitalization, patient to be seen 3 x/week to address the listed problems via gait training, therapeutic activities, therapeutic exercises, neuromuscular re-education  Plan of Care Expires:  05/29/23  Plan of Care Reviewed with: patient    Subjective     Communicated with RN prior to session.  Patient found resting in bed upon PT entry to room.   "Ok"    Pain/Comfort:  Pain Rating 1:  (did not rate)  Location - Side 1: Bilateral  Location - Orientation 1: generalized  Location 1: leg  Pain Addressed 1: Reposition, Distraction, Cessation of Activity  Pain Rating Post-Intervention 1:  (did not rate)    Objective:     Patient found with: telemetry, pulse ox (continuous), peripheral IV, PureWick   Mental Status: Patient is Lethargic and Cooperative during session.    General Precautions: Standard, fall   Orthopedic Precautions:N/A   Braces: N/A   Respiratory Status: room air  Vital Signs (Most Recent):    Temp: 97.8 °F (36.6 °C) (05/02/23 0400)  Pulse: 61 (05/02/23 0805)  Resp: 18 (05/02/23 0805)  BP: (!) 146/64 (05/02/23 0400)  SpO2: (!) 92 % (05/02/23 0805)    Functional Mobility:  Bed Mobility:   Rolling/Turning to Left: maximal assistance  Rolling/Turning to Right: maximal assistance  Supine to Sit: maximal assistance  Scooting anteriorly to EOB to have both feet planted on floor: maximal assistance  Sit to Supine: maximal assistance    Sitting Balance at Edge of Bed:  Assistance Level Required: standy by assistance  Postural deviations noted: rounded shoulders, forward head, posterior pelvic tilt, R lateral lean    Education:  Patient was educated on the following:  Progress of PT goals and plan of care  In room safety and use of call button  Importance of continued upright mobility and exercise    Patient left HOB elevated with all lines intact, call button in reach, and RN " notified.    AM-PAC 6 CLICK MOBILITY  Turning over in bed (including adjusting bedclothes, sheets and blankets)?: 2  Sitting down on and standing up from a chair with arms (e.g., wheelchair, bedside commode, etc.): 1  Moving from lying on back to sitting on the side of the bed?: 2  Moving to and from a bed to a chair (including a wheelchair)?: 1  Need to walk in hospital room?: 1  Climbing 3-5 steps with a railing?: 1  Basic Mobility Total Score: 8       Time Tracking:     PT Received On: 05/02/23  PT Start Time: 0838     PT Stop Time: 0848  PT Total Time (min): 10 min     Billable Minutes:   Therapeutic Activity 10    Treatment Type: Treatment  PT/PTA: VENKATESH Cole PT, DPT  05/02/2023

## 2023-05-02 NOTE — PLAN OF CARE
GER[place]IA has updated patient family on dc plan for SNF at [facility].      Leelee Seals LMSW  [facility]   [phone]

## 2023-05-02 NOTE — ASSESSMENT & PLAN NOTE
Current rhythm: rate controlled AF  Cardiology consulted. Signed off. Recs as below     - hold BB given bradycardia  - Discussed risk and benefits of Ac with patient. Does not want to be on AC given risk of fall and hx of GI bleed. May be candidate for watchman, refer to Dr. Figueroa with EP clinic on discharge for outpatient evaluation   - Telemetry, Maintain K > 4, Mg > 2

## 2023-05-02 NOTE — PLAN OF CARE
[place] has left a message for [facility] awaiting [facility] . GERMANIA has spoken with BenitoHumboldtnaun with [employer] reporting that she will review referral and contact GERMANIA back.      Leelee Seals LMSW  [facility]   [phone]

## 2023-05-02 NOTE — PLAN OF CARE
Problem: Adult Inpatient Plan of Care  Goal: Plan of Care Review  5/2/2023 0507 by Mayur Corral RN  Outcome: Ongoing, Progressing  5/2/2023 0027 by Mayur Corral RN  Outcome: Ongoing, Progressing  Goal: Patient-Specific Goal (Individualized)  5/2/2023 0507 by Mayur Corral RN  Outcome: Ongoing, Progressing  5/2/2023 0027 by Mayur Corral RN  Outcome: Ongoing, Progressing  Goal: Absence of Hospital-Acquired Illness or Injury  5/2/2023 0507 by Mayur Corral RN  Outcome: Ongoing, Progressing  5/2/2023 0027 by Mayur Corral RN  Outcome: Ongoing, Progressing  Goal: Optimal Comfort and Wellbeing  5/2/2023 0507 by Mayur Corral RN  Outcome: Ongoing, Progressing  5/2/2023 0027 by Mayur Corral RN  Outcome: Ongoing, Progressing  Goal: Readiness for Transition of Care  5/2/2023 0507 by Mayur Corral RN  Outcome: Ongoing, Progressing  5/2/2023 0027 by Mayur Corral RN  Outcome: Ongoing, Progressing     Problem: Bariatric Environmental Safety  Goal: Safety Maintained with Care  5/2/2023 0507 by Mayur Corral RN  Outcome: Ongoing, Progressing  5/2/2023 0027 by Mayur Corral RN  Outcome: Ongoing, Progressing     Problem: Diabetes Comorbidity  Goal: Blood Glucose Level Within Targeted Range  5/2/2023 0507 by Mayur Corral RN  Outcome: Ongoing, Progressing  5/2/2023 0027 by Mayur Corral RN  Outcome: Ongoing, Progressing     Problem: Fluid and Electrolyte Imbalance (Acute Kidney Injury/Impairment)  Goal: Fluid and Electrolyte Balance  5/2/2023 0507 by Mayur Corral RN  Outcome: Ongoing, Progressing  5/2/2023 0027 by Mayur Corral RN  Outcome: Ongoing, Progressing     Problem: Oral Intake Inadequate (Acute Kidney Injury/Impairment)  Goal: Optimal Nutrition Intake  5/2/2023 0507 by Mayur Corral RN  Outcome: Ongoing, Progressing  5/2/2023 0027 by Mayur Corral RN  Outcome: Ongoing, Progressing     Problem: Renal Function Impairment (Acute Kidney Injury/Impairment)  Goal: Effective Renal  Function  5/2/2023 0507 by Mayur Corral RN  Outcome: Ongoing, Progressing  5/2/2023 0027 by Mayur Corral RN  Outcome: Ongoing, Progressing     Problem: Impaired Wound Healing  Goal: Optimal Wound Healing  5/2/2023 0507 by Mayur Corral RN  Outcome: Ongoing, Progressing  5/2/2023 0027 by Mayur Corral RN  Outcome: Ongoing, Progressing     Problem: Skin Injury Risk Increased  Goal: Skin Health and Integrity  5/2/2023 0507 by Mayur Corral RN  Outcome: Ongoing, Progressing  5/2/2023 0027 by Mayur Corral RN  Outcome: Ongoing, Progressing     Problem: Fluid Imbalance (Pneumonia)  Goal: Fluid Balance  5/2/2023 0507 by Mayur Corral RN  Outcome: Ongoing, Progressing  5/2/2023 0027 by Mayur Corral RN  Outcome: Ongoing, Progressing     Problem: Infection (Pneumonia)  Goal: Resolution of Infection Signs and Symptoms  5/2/2023 0507 by Mayur Corral RN  Outcome: Ongoing, Progressing  5/2/2023 0027 by Mayur Corral RN  Outcome: Ongoing, Progressing     Problem: Respiratory Compromise (Pneumonia)  Goal: Effective Oxygenation and Ventilation  5/2/2023 0507 by Mayur Corral RN  Outcome: Ongoing, Progressing  5/2/2023 0027 by Mayur Corral RN  Outcome: Ongoing, Progressing

## 2023-05-02 NOTE — ASSESSMENT & PLAN NOTE
Patient's renal function continues to worsen today despite being in the hospital now >2days. This rise in creatinine is notably above her baseline of around 1.8. Accordingly we will be holding diuretics, giving some ivf appropriate for her concurrent HF in recent exacerbation and monitor to see how she does tomorrow

## 2023-05-02 NOTE — PLAN OF CARE
05/02/23 1606   Post-Acute Status   Post-Acute Authorization Placement   Post-Acute Placement Status Pending payor review/awaiting authorization (if required)       Auth is pending for SNF at [facility]      Leelee Seals LMSW  [facility]   [phone]

## 2023-05-02 NOTE — PLAN OF CARE
NURSING HOME ORDERS    05/02/2023  [facility]  [facility] - INTENSIVE CARE ([place])  [address]  [facility] [address]  Dept: [phone]  Loc: [phone]     Admit to Nursing Home:      Diagnoses:  Active Hospital Problems    Diagnosis  POA    *Acute on chronic diastolic heart failure [I50.33]  Unknown    Pneumonia due to infectious organism [J18.9]  Unknown    Atrial fibrillation [I48.91]  Unknown    Type 2 diabetes mellitus, with long-term current use of insulin [E11.9, Z79.4]  Not Applicable    Acute renal failure superimposed on stage 3 chronic kidney disease [N17.9, N18.30]  Unknown    Debility [R53.81]  Yes     Chronic    Essential hypertension [I10]  Yes     Chronic      Resolved Hospital Problems   No resolved problems to display.       Patient is homebound due to:  Acute on chronic diastolic heart failure    Allergies:  Review of patient's allergies indicates:   Allergen Reactions    Penicillins Hives     Other reaction(s): Hives  Patient has received cefdinir, ceftriaxone, cefazolin and cefepime in the past with no documented reactions    Sulfa (sulfonamide antibiotics) Other (See Comments)     Eyad, pt states her doctor told her the shakes were possibly caused by an allergy to sulfa       Vitals:  Routine    Diet: cardiac diet    Activities:   Activity as tolerated    Goals of Care Treatment Preferences:  Code Status: Full Code      Nursing Precautions:  Fall    Consults:   PT to evaluate and treat- 5 times a week and OT to evaluate and treat- 5 times a week       Medications: Discontinue all previous medication orders, if any. See new list below.     Medication List        START taking these medications      amLODIPine 10 MG tablet  Commonly known as: NORVASC  Take 1 tablet (10 mg total) by mouth once daily.  Start taking on: May 3, 2023     cefdinir 300 MG capsule  Commonly known as: OMNICEF  Take 1 capsule (300 mg total) by mouth every 12 (twelve) hours. for 4  "days            CONTINUE taking these medications      atorvastatin 40 MG tablet  Commonly known as: LIPITOR  Take 1 tablet (40 mg total) by mouth every evening.     SHANNAN CHEWABLE ASPIRIN 81 MG Chew  Generic drug: aspirin  Chew 1 tablet (81 mg total) by mouth once daily.     blood sugar diagnostic Strp  Commonly known as: TRUE METRIX GLUCOSE TEST STRIP  TEST BLOOD SUGAR TWICE DAILY     insulin glargine 100 unit/mL injection  Commonly known as: LANTUS U-100 INSULIN  Inject 15 Units into the skin every evening.     lancets 33 gauge Misc  Commonly known as: TRUEPLUS LANCETS  TEST TWO TIMES DAILY     lisinopriL 5 MG tablet  Commonly known as: PRINIVIL,ZESTRIL  Take 1 tablet (5 mg total) by mouth once daily.     pantoprazole 40 MG tablet  Commonly known as: PROTONIX  Take 1 tablet (40 mg total) by mouth once daily.     sodium bicarbonate 650 MG tablet  Take 1 tablet (650 mg total) by mouth 2 (two) times daily.            STOP taking these medications      DROPLET INSULIN SYR(HALF UNIT) 0.5 mL 30 gauge x 1/2" Syrg  Generic drug: insulin syr/ndl U100 half yesenia     sodium zirconium cyclosilicate 5 gram packet  Commonly known as: [facility]                Immunizations Administered as of 5/2/2023       No immunizations on file.            Some patients may experience side effects after vaccination.  These may include fever, headache, muscle or joint aches.  Most symptoms resolve with 24-48 hours and do not require urgent medical evaluation unless they persist for more than 72 hours or symptoms are concerning for an unrelated medical condition.          _________________________________  Shavonne Westbrook MD  05/02/2023    "

## 2023-05-02 NOTE — ASSESSMENT & PLAN NOTE
-Pt. With edema, HANDY, and PND. , CXR with pulm edema and L sided effusion consistent with HF exacerbation  -HF pathway started   -Cardiology consulted. Signed off.   - Received lasix 40mg BID. Will hold further diuresis given LOREN   - Fluid restriction at 1.5L/day  - Strict I/Os and daily standing weights  - Maintain on telemetry and daily EKGs  - Maintain Mag >2 & K+ >4  - SCDs, TEDs, Nursing communication to elevated LE, ambulate as tolerated

## 2023-05-02 NOTE — PLAN OF CARE
GER[place]IA spoke with Hannah reporting that auth is pending at this time with [facility]. GERMANIA will request SNF orders.        Leelee Seals LMSW  [facility]   [phone]

## 2023-05-02 NOTE — NURSING
Patient's  HR was  in 50s most of the time ans drop to 47s as well , but asymptomatic notified provider. Call light within reach ,safety precaution maintained. Will continue monitoring.

## 2023-05-02 NOTE — PLAN OF CARE
Problem: Adult Inpatient Plan of Care  Goal: Plan of Care Review  Outcome: Ongoing, Progressing  Goal: Patient-Specific Goal (Individualized)  Outcome: Ongoing, Progressing  Goal: Absence of Hospital-Acquired Illness or Injury  Outcome: Ongoing, Progressing  Goal: Optimal Comfort and Wellbeing  Outcome: Ongoing, Progressing  Goal: Readiness for Transition of Care  Outcome: Ongoing, Progressing     Problem: Bariatric Environmental Safety  Goal: Safety Maintained with Care  Outcome: Ongoing, Progressing     Problem: Diabetes Comorbidity  Goal: Blood Glucose Level Within Targeted Range  Outcome: Ongoing, Progressing     Problem: Fluid and Electrolyte Imbalance (Acute Kidney Injury/Impairment)  Goal: Fluid and Electrolyte Balance  Outcome: Ongoing, Progressing     Problem: Oral Intake Inadequate (Acute Kidney Injury/Impairment)  Goal: Optimal Nutrition Intake  Outcome: Ongoing, Progressing     Problem: Renal Function Impairment (Acute Kidney Injury/Impairment)  Goal: Effective Renal Function  Outcome: Ongoing, Progressing     Problem: Impaired Wound Healing  Goal: Optimal Wound Healing  Outcome: Ongoing, Progressing     Problem: Skin Injury Risk Increased  Goal: Skin Health and Integrity  Outcome: Ongoing, Progressing     Problem: Fluid Imbalance (Pneumonia)  Goal: Fluid Balance  Outcome: Ongoing, Progressing     Problem: Infection (Pneumonia)  Goal: Resolution of Infection Signs and Symptoms  Outcome: Ongoing, Progressing

## 2023-05-02 NOTE — NURSING
Patient arrived to unit alert and oriented x 4. Denies any pain or discomfort. Denies any respiratory distress. Patient slept well. Safety measures maintain. Bed in lowest position and call light within reach.

## 2023-05-03 LAB
ANION GAP SERPL CALC-SCNC: 8 MMOL/L (ref 8–16)
BASOPHILS # BLD AUTO: 0.01 K/UL (ref 0–0.2)
BASOPHILS NFR BLD: 0.2 % (ref 0–1.9)
BUN SERPL-MCNC: 36 MG/DL (ref 8–23)
CALCIUM SERPL-MCNC: 8.7 MG/DL (ref 8.7–10.5)
CHLORIDE SERPL-SCNC: 105 MMOL/L (ref 95–110)
CO2 SERPL-SCNC: 29 MMOL/L (ref 23–29)
CREAT SERPL-MCNC: 2.4 MG/DL (ref 0.5–1.4)
DIFFERENTIAL METHOD: ABNORMAL
EOSINOPHIL # BLD AUTO: 0.1 K/UL (ref 0–0.5)
EOSINOPHIL NFR BLD: 1.1 % (ref 0–8)
ERYTHROCYTE [DISTWIDTH] IN BLOOD BY AUTOMATED COUNT: 16.8 % (ref 11.5–14.5)
EST. GFR  (NO RACE VARIABLE): 19.9 ML/MIN/1.73 M^2
GLUCOSE SERPL-MCNC: 130 MG/DL (ref 70–110)
HCT VFR BLD AUTO: 29.5 % (ref 37–48.5)
HGB BLD-MCNC: 8.6 G/DL (ref 12–16)
IMM GRANULOCYTES # BLD AUTO: 0.01 K/UL (ref 0–0.04)
IMM GRANULOCYTES NFR BLD AUTO: 0.2 % (ref 0–0.5)
LYMPHOCYTES # BLD AUTO: 0.7 K/UL (ref 1–4.8)
LYMPHOCYTES NFR BLD: 16.1 % (ref 18–48)
MCH RBC QN AUTO: 23.8 PG (ref 27–31)
MCHC RBC AUTO-ENTMCNC: 29.2 G/DL (ref 32–36)
MCV RBC AUTO: 82 FL (ref 82–98)
MONOCYTES # BLD AUTO: 0.5 K/UL (ref 0.3–1)
MONOCYTES NFR BLD: 12.3 % (ref 4–15)
NEUTROPHILS # BLD AUTO: 3.1 K/UL (ref 1.8–7.7)
NEUTROPHILS NFR BLD: 70.1 % (ref 38–73)
NRBC BLD-RTO: 0 /100 WBC
PLATELET # BLD AUTO: 165 K/UL (ref 150–450)
PMV BLD AUTO: 10.4 FL (ref 9.2–12.9)
POCT GLUCOSE: 117 MG/DL (ref 70–110)
POCT GLUCOSE: 144 MG/DL (ref 70–110)
POCT GLUCOSE: 153 MG/DL (ref 70–110)
POCT GLUCOSE: 233 MG/DL (ref 70–110)
POTASSIUM SERPL-SCNC: 4.2 MMOL/L (ref 3.5–5.1)
RBC # BLD AUTO: 3.62 M/UL (ref 4–5.4)
SODIUM SERPL-SCNC: 142 MMOL/L (ref 136–145)
WBC # BLD AUTO: 4.4 K/UL (ref 3.9–12.7)

## 2023-05-03 PROCEDURE — 25000003 PHARM REV CODE 250: Performed by: HOSPITALIST

## 2023-05-03 PROCEDURE — 80048 BASIC METABOLIC PNL TOTAL CA: CPT | Performed by: STUDENT IN AN ORGANIZED HEALTH CARE EDUCATION/TRAINING PROGRAM

## 2023-05-03 PROCEDURE — 99232 SBSQ HOSP IP/OBS MODERATE 35: CPT | Mod: ,,, | Performed by: STUDENT IN AN ORGANIZED HEALTH CARE EDUCATION/TRAINING PROGRAM

## 2023-05-03 PROCEDURE — 20600001 HC STEP DOWN PRIVATE ROOM

## 2023-05-03 PROCEDURE — 25000242 PHARM REV CODE 250 ALT 637 W/ HCPCS: Performed by: STUDENT IN AN ORGANIZED HEALTH CARE EDUCATION/TRAINING PROGRAM

## 2023-05-03 PROCEDURE — 63700000 PHARM REV CODE 250 ALT 637 W/O HCPCS: Performed by: STUDENT IN AN ORGANIZED HEALTH CARE EDUCATION/TRAINING PROGRAM

## 2023-05-03 PROCEDURE — 94761 N-INVAS EAR/PLS OXIMETRY MLT: CPT

## 2023-05-03 PROCEDURE — 85025 COMPLETE CBC W/AUTO DIFF WBC: CPT | Performed by: STUDENT IN AN ORGANIZED HEALTH CARE EDUCATION/TRAINING PROGRAM

## 2023-05-03 PROCEDURE — 63600175 PHARM REV CODE 636 W HCPCS: Performed by: STUDENT IN AN ORGANIZED HEALTH CARE EDUCATION/TRAINING PROGRAM

## 2023-05-03 PROCEDURE — 94640 AIRWAY INHALATION TREATMENT: CPT

## 2023-05-03 PROCEDURE — 36415 COLL VENOUS BLD VENIPUNCTURE: CPT | Performed by: STUDENT IN AN ORGANIZED HEALTH CARE EDUCATION/TRAINING PROGRAM

## 2023-05-03 PROCEDURE — 25000003 PHARM REV CODE 250: Performed by: STUDENT IN AN ORGANIZED HEALTH CARE EDUCATION/TRAINING PROGRAM

## 2023-05-03 PROCEDURE — 99232 PR SUBSEQUENT HOSPITAL CARE,LEVL II: ICD-10-PCS | Mod: ,,, | Performed by: STUDENT IN AN ORGANIZED HEALTH CARE EDUCATION/TRAINING PROGRAM

## 2023-05-03 PROCEDURE — 97530 THERAPEUTIC ACTIVITIES: CPT

## 2023-05-03 RX ORDER — ACETAMINOPHEN 325 MG/1
650 TABLET ORAL EVERY 6 HOURS PRN
Status: DISCONTINUED | OUTPATIENT
Start: 2023-05-03 | End: 2023-05-05 | Stop reason: HOSPADM

## 2023-05-03 RX ADMIN — IPRATROPIUM BROMIDE AND ALBUTEROL SULFATE 3 ML: 2.5; .5 SOLUTION RESPIRATORY (INHALATION) at 01:05

## 2023-05-03 RX ADMIN — ATORVASTATIN CALCIUM 40 MG: 40 TABLET, FILM COATED ORAL at 08:05

## 2023-05-03 RX ADMIN — INSULIN DETEMIR 8 UNITS: 100 INJECTION, SOLUTION SUBCUTANEOUS at 08:05

## 2023-05-03 RX ADMIN — IPRATROPIUM BROMIDE AND ALBUTEROL SULFATE 3 ML: 2.5; .5 SOLUTION RESPIRATORY (INHALATION) at 09:05

## 2023-05-03 RX ADMIN — AMLODIPINE BESYLATE 10 MG: 10 TABLET ORAL at 09:05

## 2023-05-03 RX ADMIN — ACETAMINOPHEN 650 MG: 325 TABLET ORAL at 06:05

## 2023-05-03 RX ADMIN — PANTOPRAZOLE SODIUM 40 MG: 40 TABLET, DELAYED RELEASE ORAL at 09:05

## 2023-05-03 RX ADMIN — SODIUM BICARBONATE 650 MG: 650 TABLET ORAL at 09:05

## 2023-05-03 RX ADMIN — IPRATROPIUM BROMIDE AND ALBUTEROL SULFATE 3 ML: 2.5; .5 SOLUTION RESPIRATORY (INHALATION) at 08:05

## 2023-05-03 RX ADMIN — INSULIN ASPART 3 UNITS: 100 INJECTION, SOLUTION INTRAVENOUS; SUBCUTANEOUS at 04:05

## 2023-05-03 RX ADMIN — SODIUM CHLORIDE, POTASSIUM CHLORIDE, SODIUM LACTATE AND CALCIUM CHLORIDE 500 ML: 600; 310; 30; 20 INJECTION, SOLUTION INTRAVENOUS at 10:05

## 2023-05-03 RX ADMIN — CEFTRIAXONE 1 G: 1 INJECTION, POWDER, FOR SOLUTION INTRAMUSCULAR; INTRAVENOUS at 09:05

## 2023-05-03 RX ADMIN — ASPIRIN 81 MG CHEWABLE TABLET 81 MG: 81 TABLET CHEWABLE at 09:05

## 2023-05-03 RX ADMIN — INSULIN ASPART 2 UNITS: 100 INJECTION, SOLUTION INTRAVENOUS; SUBCUTANEOUS at 04:05

## 2023-05-03 RX ADMIN — INSULIN ASPART 3 UNITS: 100 INJECTION, SOLUTION INTRAVENOUS; SUBCUTANEOUS at 01:05

## 2023-05-03 RX ADMIN — AZITHROMYCIN MONOHYDRATE 500 MG: 250 TABLET ORAL at 09:05

## 2023-05-03 RX ADMIN — SODIUM BICARBONATE 650 MG: 650 TABLET ORAL at 08:05

## 2023-05-03 RX ADMIN — INSULIN ASPART 3 UNITS: 100 INJECTION, SOLUTION INTRAVENOUS; SUBCUTANEOUS at 09:05

## 2023-05-03 NOTE — PROGRESS NOTES
Patient seen for wound care consultation for BLE Chronic Wounds  Reviewed chart for this encounter.   See Flow Sheet for findings.    Pt sitting up in bed, agreed to assessment. Calamine co-flex removed from BLE, pt had xeroform on several locations of BLE, no open wounds noted, scattered areas of dried drainage, legs cleansed.   ABIs reviewed w/MD DEUTSCH and ALMAS. Compression not re-applied d/t DISHA results & pt dx for this admission.This was explained to pt and family at bedside w/pt permission. Pt to f/u w/WC at [facility].    RECOMMENDATIONS:  Keep legs elevated, clean and moisturize.   Do not scrub BLE.    Discussed POC with patient and primary RN.   See EMR for orders & patient education.    Nursing to continue care.  Nursing to maintain pressure injury prevention interventions.    WC sign off at this time. Pt to be d/c.       05/03/23 0800   WOCN Assessment   WOCN Total Time (mins) 30   Visit Date 05/03/23   Visit Time 0800   Consult Type New   WOCN Speciality Wound   Wound venous   Intervention assessed;changed;chart review;coordination of care;consult other service;team conference;orders   Teaching on-going        Wound 08/18/22 1400 Other (comment) Right lower Leg   Date First Assessed/Time First Assessed: 08/18/22 1400   Pre-existing: Yes  Primary Wound Type: (c) Other (comment)  Side: Right  Orientation: lower  Location: Leg  Pulses: doppler   Wound Image     Dressing Appearance Clean;Dry;Intact   Drainage Amount None   Drainage Characteristics/Odor No odor   Tissue loss description Not applicable   Care Cleansed with:;Soap and water        Wound 08/18/22 1400 Other (comment) Left lower Leg   Date First Assessed/Time First Assessed: 08/18/22 1400   Pre-existing: Yes  Primary Wound Type: Other (comment)  Side: Left  Orientation: lower  Location: Leg  Pulses: doppler   Wound Image      Dressing Appearance Clean;Dry;Intact   Drainage Amount None   Tissue loss description Not applicable   Wound Edges  Approximated

## 2023-05-03 NOTE — SUBJECTIVE & OBJECTIVE
Interval History:   Patient's renal function continues to worsen today despite being in the hospital now >2days. This rise in creatinine is notably above her baseline of around 1.8. Accordingly we will be holding diuretics, Continue to give some ivf appropriate for her concurrent HF in recent exacerbation and monitor to see how she does tomorrow     Review of Systems  Objective:     Vital Signs (Most Recent):  Temp: 97.9 °F (36.6 °C) (05/03/23 0829)  Pulse: 62 (05/03/23 0833)  Resp: 20 (05/03/23 0833)  BP: 135/66 (05/03/23 0829)  SpO2: 95 % (05/03/23 0833)   Vital Signs (24h Range):  Temp:  [97.6 °F (36.4 °C)-98.1 °F (36.7 °C)] 97.9 °F (36.6 °C)  Pulse:  [55-64] 62  Resp:  [18-22] 20  SpO2:  [87 %-100 %] 95 %  BP: (123-152)/(56-67) 135/66     Weight: 122.9 kg (270 lb 15.1 oz)  Body mass index is 42.44 kg/m².    Intake/Output Summary (Last 24 hours) at 5/3/2023 1122  Last data filed at 5/3/2023 0624  Gross per 24 hour   Intake 250 ml   Output 500 ml   Net -250 ml        Physical Exam  Vitals reviewed.   Constitutional:       General: She is not in acute distress.     Appearance: She is well-developed. She is obese.   HENT:      Head: Normocephalic and atraumatic.   Eyes:      Extraocular Movements: Extraocular movements intact.      Pupils: Pupils are equal, round, and reactive to light.   Neck:      Vascular: No JVD.      Trachea: No tracheal deviation.   Cardiovascular:      Rate and Rhythm: Normal rate and regular rhythm.      Heart sounds: No murmur heard.    No friction rub. No gallop.   Pulmonary:      Effort: No respiratory distress.      Breath sounds: Normal breath sounds. No wheezing or rales.   Abdominal:      General: Bowel sounds are normal. There is no distension.      Palpations: Abdomen is soft. There is no mass.      Tenderness: There is no abdominal tenderness.   Musculoskeletal:         General: No deformity.      Cervical back: Neck supple.      Right lower leg: No edema.      Left lower leg: No  edema.   Lymphadenopathy:      Cervical: No cervical adenopathy.   Skin:     General: Skin is warm and dry.      Findings: No rash.   Neurological:      Mental Status: She is alert and oriented to person, place, and time.

## 2023-05-03 NOTE — NURSING
Patient is AAO*4 . Had Bladder movement . Patient was acceptance to care . Call light within reach ,safety precaution maintained. Will continue monitoring.

## 2023-05-03 NOTE — PROGRESS NOTES
[facility] - [facility] ([address] Medicine  Progress Note    Patient Name: Sherin Ortiz  MRN: [id number]  Patient Class: IP- Inpatient   Admission Date: 4/28/2023  Length of Stay: 1 days  Attending Physician: Shavonne Westbrook MD  Primary Care Provider: Darby Baum MD        Subjective:     Principal Problem:Acute on chronic diastolic heart failure        HPI:  75 yo F with PMHx CKD3, HFpEF, HTN, DM2, and HLD who presents to he ED complaining of progressively worsening SOB and weakness x 1 week. Pt. Reports noting decreased exercise tolerance and fatigue over the last week. She has had difficulty completing her ADL's around the house. Additionally she reports difficulty sleeping because she gets SOB when she lies flat. Today, the patient felt so SOB and unsteady on her feet that she came to the ED. She denies any cough, fevers, chills, chest pain, palpitations, nausea, or vomiting. No reported worsening edema (pt. Has venous stasis at baseline)      Overview/Hospital Course:  No notes on file    Interval History:   Patient's renal function continues to worsen today despite being in the hospital now >2days. This rise in creatinine is notably above her baseline of around 1.8. Accordingly we will be holding diuretics, Continue to give some ivf appropriate for her concurrent HF in recent exacerbation and monitor to see how she does tomorrow     Review of Systems  Objective:     Vital Signs (Most Recent):  Temp: 97.9 °F (36.6 °C) (05/03/23 0829)  Pulse: 62 (05/03/23 0833)  Resp: 20 (05/03/23 0833)  BP: 135/66 (05/03/23 0829)  SpO2: 95 % (05/03/23 0833)   Vital Signs (24h Range):  Temp:  [97.6 °F (36.4 °C)-98.1 °F (36.7 °C)] 97.9 °F (36.6 °C)  Pulse:  [55-64] 62  Resp:  [18-22] 20  SpO2:  [87 %-100 %] 95 %  BP: (123-152)/(56-67) 135/66     Weight: 122.9 kg (270 lb 15.1 oz)  Body mass index is 42.44 kg/m².    Intake/Output Summary (Last 24 hours) at 5/3/2023 1122  Last data filed at 5/3/2023  0624  Gross per 24 hour   Intake 250 ml   Output 500 ml   Net -250 ml        Physical Exam  Vitals reviewed.   Constitutional:       General: She is not in acute distress.     Appearance: She is well-developed. She is obese.   HENT:      Head: Normocephalic and atraumatic.   Eyes:      Extraocular Movements: Extraocular movements intact.      Pupils: Pupils are equal, round, and reactive to light.   Neck:      Vascular: No JVD.      Trachea: No tracheal deviation.   Cardiovascular:      Rate and Rhythm: Normal rate and regular rhythm.      Heart sounds: No murmur heard.    No friction rub. No gallop.   Pulmonary:      Effort: No respiratory distress.      Breath sounds: Normal breath sounds. No wheezing or rales.   Abdominal:      General: Bowel sounds are normal. There is no distension.      Palpations: Abdomen is soft. There is no mass.      Tenderness: There is no abdominal tenderness.   Musculoskeletal:         General: No deformity.      Cervical back: Neck supple.      Right lower leg: No edema.      Left lower leg: No edema.   Lymphadenopathy:      Cervical: No cervical adenopathy.   Skin:     General: Skin is warm and dry.      Findings: No rash.   Neurological:      Mental Status: She is alert and oriented to person, place, and time.         Assessment/Plan:      * Acute on chronic diastolic heart failure  -Pt. With edema, HANDY, and PND. , CXR with pulm edema and L sided effusion consistent with HF exacerbation  -HF pathway started   -Cardiology consulted. Signed off.   - Received lasix 40mg BID. Will hold further diuresis given LOREN   - Fluid restriction at 1.5L/day  - Strict I/Os and daily standing weights  - Maintain on telemetry and daily EKGs  - Maintain Mag >2 & K+ >4  - SCDs, TEDs, Nursing communication to elevated LE, ambulate as tolerated      Pneumonia due to infectious organism  CXR with possible LLL consolidation.   Empirically treating for CAP      Atrial fibrillation  Current rhythm: rate  controlled AF  Cardiology consulted. Signed off. Recs as below     - hold BB given bradycardia  - Discussed risk and benefits of Ac with patient. Does not want to be on AC given risk of fall and hx of GI bleed. May be candidate for watchman, refer to Dr. Figueroa with EP clinic on discharge for outpatient evaluation   - Telemetry, Maintain K > 4, Mg > 2      Type 2 diabetes mellitus, with long-term current use of insulin  -A1c 8.3 3/2023. Home reigmen lantus 15 units QHS  -Levemir 8 units QHS with sliding scale insulin and diabetic diet ordered while inpatient    Acute renal failure superimposed on stage 3 chronic kidney disease  Patient's renal function continues to worsen today despite being in the hospital now >2days. This rise in creatinine is notably above her baseline of around 1.8. Accordingly we will be holding diuretics, giving some ivf appropriate for her concurrent HF in recent exacerbation and monitor to see how she does tomorrow        Debility  -PT/OT consulted. Rec SNF      Essential hypertension  -Pt. Markedly HTN on presentation, SBP >200  -Suspect HTN contributing to worsening of HF, home med lisinopril 5 mg only. Holding in setting of LOREN  -Will start amlodipine 10 mg (avoid B-blockers due to bradycardia on presentation). Hydralazine PO ordered PRN        VTE Risk Mitigation (From admission, onward)         Ordered     IP VTE HIGH RISK PATIENT  Once         04/28/23 2238                Discharge Planning   GABE: 5/3/2023     Code Status: Full Code   Is the patient medically ready for discharge?: Yes    Reason for patient still in hospital (select all that apply): Patient trending condition  Discharge Plan A: Skilled Nursing Facility   Discharge Delays: None known at this time              Shavonne Westbrook MD  Department of Hospital Medicine   [facility] - Intensive Care ([facility]

## 2023-05-03 NOTE — PT/OT/SLP PROGRESS
"Occupational Therapy   Treatment    Name: Sherin Ortiz  MRN: [id number]  Admitting Diagnosis:  Acute on chronic diastolic heart failure       Recommendations:     Discharge Recommendations: nursing facility, skilled  Discharge Equipment Recommendations:  shower chair, to be determined by next level of care  Barriers to discharge:       Assessment:     Sherin Ortiz is a 78 y.o. female with a medical diagnosis of Acute on chronic diastolic heart failure.  She presents with pain in RLE limiting fx mob this date. Pt demo'd decreased act margarito seated EOB. Pt req assist opening containers and cutting food. Performance deficits affecting function are weakness, impaired endurance, impaired self care skills, impaired functional mobility, decreased coordination, impaired balance, decreased upper extremity function, decreased lower extremity function, decreased safety awareness, pain, impaired cardiopulmonary response to activity.     Rehab Prognosis:  Fair; patient would benefit from acute skilled OT services to address these deficits and reach maximum level of function.       Plan:     Patient to be seen 3 x/week to address the above listed problems via self-care/home management, therapeutic activities, therapeutic exercises  Plan of Care Expires: 05/29/23  Plan of Care Reviewed with: patient    Subjective     Chief Complaint: "My leg started hurting yesterday."  Patient/Family Comments/goals: none stated  Pain/Comfort:  Pain Rating 1: 9/10  Location - Side 1: Right  Location 1: leg  Pain Addressed 1: Reposition, Cessation of Activity    Objective:     Communicated with: RN prior to session.  Patient found supine with telemetry, PureWick, pulse ox (continuous), peripheral IV upon OT entry to room.    General Precautions: Standard, fall    Orthopedic Precautions:N/A  Braces: N/A  Respiratory Status: Room air     Occupational Performance:     Bed Mobility:    Patient completed Rolling/Turning to Left with  moderate " assistance  Patient completed Scooting/Bridging with moderate assistance  Patient completed Supine to Sit with moderate assistance  Patient completed Sit to Supine with maximal assistance     Activities of Daily Living:  Feeding:  minimum assistance assist opening containers and cutting up food      AMPA 6 Click ADL: 15    Treatment & Education:  Pt educated on importance of participation in tx. Pt educated on importance of attempting T/Fs and fx mob for strength and balance.     Patient left supine with all lines intact and call button in reach    GOALS:   Multidisciplinary Problems       Occupational Therapy Goals          Problem: Occupational Therapy    Goal Priority Disciplines Outcome Interventions   Occupational Therapy Goal     OT, PT/OT Ongoing, Progressing    Description: Goals to be met by: 5/13/23     Patient will increase functional independence with ADLs by performing:    UE Dressing with Modified [place].  LE Dressing with Stand-by Assistance.  Grooming while EOB with Modified [place].  Toileting from bedside commode with Stand-by Assistance for hygiene and clothing management.   Toilet transfer to bedside commode with Contact Guard Assistance.                         Time Tracking:     OT Date of Treatment: 05/03/23  OT Start Time: 1345  OT Stop Time: 1403  OT Total Time (min): 18 min    Billable Minutes:Therapeutic Activity 18    OT/ZELDA: OT          5/3/2023

## 2023-05-04 LAB
ANION GAP SERPL CALC-SCNC: 9 MMOL/L (ref 8–16)
BACTERIA BLD CULT: NORMAL
BACTERIA BLD CULT: NORMAL
BASOPHILS # BLD AUTO: 0.01 K/UL (ref 0–0.2)
BASOPHILS NFR BLD: 0.3 % (ref 0–1.9)
BUN SERPL-MCNC: 40 MG/DL (ref 8–23)
CALCIUM SERPL-MCNC: 9.1 MG/DL (ref 8.7–10.5)
CHLORIDE SERPL-SCNC: 103 MMOL/L (ref 95–110)
CO2 SERPL-SCNC: 25 MMOL/L (ref 23–29)
CREAT SERPL-MCNC: 2.3 MG/DL (ref 0.5–1.4)
DIFFERENTIAL METHOD: ABNORMAL
EOSINOPHIL # BLD AUTO: 0.1 K/UL (ref 0–0.5)
EOSINOPHIL NFR BLD: 1.4 % (ref 0–8)
ERYTHROCYTE [DISTWIDTH] IN BLOOD BY AUTOMATED COUNT: 16.8 % (ref 11.5–14.5)
EST. GFR  (NO RACE VARIABLE): 21 ML/MIN/1.73 M^2
GLUCOSE SERPL-MCNC: 82 MG/DL (ref 70–110)
HCT VFR BLD AUTO: 31.7 % (ref 37–48.5)
HGB BLD-MCNC: 9.3 G/DL (ref 12–16)
IMM GRANULOCYTES # BLD AUTO: 0 K/UL (ref 0–0.04)
IMM GRANULOCYTES NFR BLD AUTO: 0 % (ref 0–0.5)
LYMPHOCYTES # BLD AUTO: 0.6 K/UL (ref 1–4.8)
LYMPHOCYTES NFR BLD: 17 % (ref 18–48)
MCH RBC QN AUTO: 24.2 PG (ref 27–31)
MCHC RBC AUTO-ENTMCNC: 29.3 G/DL (ref 32–36)
MCV RBC AUTO: 82 FL (ref 82–98)
MONOCYTES # BLD AUTO: 0.4 K/UL (ref 0.3–1)
MONOCYTES NFR BLD: 11.4 % (ref 4–15)
NEUTROPHILS # BLD AUTO: 2.5 K/UL (ref 1.8–7.7)
NEUTROPHILS NFR BLD: 69.9 % (ref 38–73)
NRBC BLD-RTO: 0 /100 WBC
PLATELET # BLD AUTO: 163 K/UL (ref 150–450)
PMV BLD AUTO: 10.3 FL (ref 9.2–12.9)
POCT GLUCOSE: 131 MG/DL (ref 70–110)
POCT GLUCOSE: 155 MG/DL (ref 70–110)
POCT GLUCOSE: 203 MG/DL (ref 70–110)
POCT GLUCOSE: 79 MG/DL (ref 70–110)
POTASSIUM SERPL-SCNC: 4.2 MMOL/L (ref 3.5–5.1)
RBC # BLD AUTO: 3.85 M/UL (ref 4–5.4)
SODIUM SERPL-SCNC: 137 MMOL/L (ref 136–145)
TB INDURATION 48 - 72 HR READ: 0 MM
WBC # BLD AUTO: 3.59 K/UL (ref 3.9–12.7)

## 2023-05-04 PROCEDURE — 63700000 PHARM REV CODE 250 ALT 637 W/O HCPCS: Performed by: STUDENT IN AN ORGANIZED HEALTH CARE EDUCATION/TRAINING PROGRAM

## 2023-05-04 PROCEDURE — 97110 THERAPEUTIC EXERCISES: CPT | Mod: CQ

## 2023-05-04 PROCEDURE — 25000003 PHARM REV CODE 250: Performed by: HOSPITALIST

## 2023-05-04 PROCEDURE — 25000003 PHARM REV CODE 250: Performed by: STUDENT IN AN ORGANIZED HEALTH CARE EDUCATION/TRAINING PROGRAM

## 2023-05-04 PROCEDURE — 99900035 HC TECH TIME PER 15 MIN (STAT)

## 2023-05-04 PROCEDURE — 20600001 HC STEP DOWN PRIVATE ROOM

## 2023-05-04 PROCEDURE — 97530 THERAPEUTIC ACTIVITIES: CPT | Mod: CQ

## 2023-05-04 PROCEDURE — 80048 BASIC METABOLIC PNL TOTAL CA: CPT | Performed by: STUDENT IN AN ORGANIZED HEALTH CARE EDUCATION/TRAINING PROGRAM

## 2023-05-04 PROCEDURE — 99232 PR SUBSEQUENT HOSPITAL CARE,LEVL II: ICD-10-PCS | Mod: ,,, | Performed by: STUDENT IN AN ORGANIZED HEALTH CARE EDUCATION/TRAINING PROGRAM

## 2023-05-04 PROCEDURE — 36415 COLL VENOUS BLD VENIPUNCTURE: CPT | Performed by: STUDENT IN AN ORGANIZED HEALTH CARE EDUCATION/TRAINING PROGRAM

## 2023-05-04 PROCEDURE — 85025 COMPLETE CBC W/AUTO DIFF WBC: CPT | Performed by: STUDENT IN AN ORGANIZED HEALTH CARE EDUCATION/TRAINING PROGRAM

## 2023-05-04 PROCEDURE — 99232 SBSQ HOSP IP/OBS MODERATE 35: CPT | Mod: ,,, | Performed by: STUDENT IN AN ORGANIZED HEALTH CARE EDUCATION/TRAINING PROGRAM

## 2023-05-04 PROCEDURE — 99900031 HC PATIENT EDUCATION (STAT)

## 2023-05-04 PROCEDURE — 63600175 PHARM REV CODE 636 W HCPCS: Performed by: STUDENT IN AN ORGANIZED HEALTH CARE EDUCATION/TRAINING PROGRAM

## 2023-05-04 PROCEDURE — 94761 N-INVAS EAR/PLS OXIMETRY MLT: CPT

## 2023-05-04 RX ORDER — IPRATROPIUM BROMIDE AND ALBUTEROL SULFATE 2.5; .5 MG/3ML; MG/3ML
3 SOLUTION RESPIRATORY (INHALATION) EVERY 6 HOURS PRN
Status: DISCONTINUED | OUTPATIENT
Start: 2023-05-04 | End: 2023-05-05 | Stop reason: HOSPADM

## 2023-05-04 RX ADMIN — INSULIN DETEMIR 8 UNITS: 100 INJECTION, SOLUTION SUBCUTANEOUS at 10:05

## 2023-05-04 RX ADMIN — SODIUM BICARBONATE 650 MG: 650 TABLET ORAL at 10:05

## 2023-05-04 RX ADMIN — ASPIRIN 81 MG CHEWABLE TABLET 81 MG: 81 TABLET CHEWABLE at 09:05

## 2023-05-04 RX ADMIN — PANTOPRAZOLE SODIUM 40 MG: 40 TABLET, DELAYED RELEASE ORAL at 09:05

## 2023-05-04 RX ADMIN — AMLODIPINE BESYLATE 10 MG: 10 TABLET ORAL at 09:05

## 2023-05-04 RX ADMIN — ATORVASTATIN CALCIUM 40 MG: 40 TABLET, FILM COATED ORAL at 10:05

## 2023-05-04 RX ADMIN — INSULIN ASPART 1 UNITS: 100 INJECTION, SOLUTION INTRAVENOUS; SUBCUTANEOUS at 10:05

## 2023-05-04 RX ADMIN — SODIUM BICARBONATE 650 MG: 650 TABLET ORAL at 09:05

## 2023-05-04 RX ADMIN — CEFTRIAXONE 1 G: 1 INJECTION, POWDER, FOR SOLUTION INTRAMUSCULAR; INTRAVENOUS at 09:05

## 2023-05-04 RX ADMIN — AZITHROMYCIN MONOHYDRATE 500 MG: 250 TABLET ORAL at 09:05

## 2023-05-04 RX ADMIN — ACETAMINOPHEN 650 MG: 325 TABLET ORAL at 10:05

## 2023-05-04 RX ADMIN — SODIUM CHLORIDE, SODIUM LACTATE, POTASSIUM CHLORIDE, AND CALCIUM CHLORIDE 1000 ML: .6; .31; .03; .02 INJECTION, SOLUTION INTRAVENOUS at 10:05

## 2023-05-04 NOTE — PROGRESS NOTES
[facility] - [facility] ([address] Medicine  Progress Note    Patient Name: Sherin Ortiz  MRN: [id number]  Patient Class: IP- Inpatient   Admission Date: 4/28/2023  Length of Stay: 2 days  Attending Physician: Shavonne Westbrook MD  Primary Care Provider: Darby Baum MD        Subjective:     Principal Problem:Acute on chronic diastolic heart failure        HPI:  77 yo F with PMHx CKD3, HFpEF, HTN, DM2, and HLD who presents to he ED complaining of progressively worsening SOB and weakness x 1 week. Pt. Reports noting decreased exercise tolerance and fatigue over the last week. She has had difficulty completing her ADL's around the house. Additionally she reports difficulty sleeping because she gets SOB when she lies flat. Today, the patient felt so SOB and unsteady on her feet that she came to the ED. She denies any cough, fevers, chills, chest pain, palpitations, nausea, or vomiting. No reported worsening edema (pt. Has venous stasis at baseline)      Overview/Hospital Course:  No notes on file    Interval History: Cr slightly improved. Continue gentle IV hydration. Plan for placement in SNF when medically ready     Review of Systems  Objective:     Vital Signs (Most Recent):  Temp: 97.4 °F (36.3 °C) (05/04/23 0824)  Pulse: (!) 56 (05/04/23 1145)  Resp: (!) 24 (05/04/23 1145)  BP: (!) 127/57 (05/04/23 1145)  SpO2: 96 % (05/04/23 1145)   Vital Signs (24h Range):  Temp:  [97.4 °F (36.3 °C)-97.8 °F (36.6 °C)] 97.4 °F (36.3 °C)  Pulse:  [49-63] 56  Resp:  [16-27] 24  SpO2:  [93 %-99 %] 96 %  BP: (110-145)/(50-63) 127/57     Weight: 122.9 kg (270 lb 15.1 oz)  Body mass index is 42.44 kg/m².    Intake/Output Summary (Last 24 hours) at 5/4/2023 1204  Last data filed at 5/4/2023 1152  Gross per 24 hour   Intake 300 ml   Output 650 ml   Net -350 ml         Physical Exam  Vitals reviewed.   Constitutional:       General: She is not in acute distress.     Appearance: She is well-developed. She is obese.    HENT:      Head: Normocephalic and atraumatic.   Eyes:      Extraocular Movements: Extraocular movements intact.      Pupils: Pupils are equal, round, and reactive to light.   Neck:      Vascular: No JVD.      Trachea: No tracheal deviation.   Cardiovascular:      Rate and Rhythm: Normal rate and regular rhythm.      Heart sounds: No murmur heard.    No friction rub. No gallop.   Pulmonary:      Effort: No respiratory distress.      Breath sounds: Normal breath sounds. No wheezing or rales.   Abdominal:      General: Bowel sounds are normal. There is no distension.      Palpations: Abdomen is soft. There is no mass.      Tenderness: There is no abdominal tenderness.   Musculoskeletal:         General: No deformity.      Cervical back: Neck supple.      Right lower leg: No edema.      Left lower leg: No edema.   Lymphadenopathy:      Cervical: No cervical adenopathy.   Skin:     General: Skin is warm and dry.      Findings: No rash.   Neurological:      Mental Status: She is alert and oriented to person, place, and time.             Assessment/Plan:      * Acute on chronic diastolic heart failure  -Pt. With edema, HANDY, and PND. , CXR with pulm edema and L sided effusion consistent with HF exacerbation  -HF pathway started   -Cardiology consulted. Signed off.   - Received lasix 40mg BID. Will hold further diuresis given LOREN   - Fluid restriction at 1.5L/day  - Strict I/Os and daily standing weights  - Maintain on telemetry and daily EKGs  - Maintain Mag >2 & K+ >4  - SCDs, TEDs, Nursing communication to elevated LE, ambulate as tolerated          Pneumonia due to infectious organism  CXR with possible LLL consolidation.   Empirically treating for CAP      Atrial fibrillation  Current rhythm: rate controlled AF  Cardiology consulted. Signed off. Recs as below     - hold BB given bradycardia  - Discussed risk and benefits of Ac with patient. Does not want to be on AC given risk of fall and hx of GI bleed. May  be candidate for watchman, refer to Dr. Figueroa with EP clinic on discharge for outpatient evaluation   - Telemetry, Maintain K > 4, Mg > 2      Type 2 diabetes mellitus, with long-term current use of insulin  -A1c 8.3 3/2023. Home reigmen lantus 15 units QHS  -Levemir 8 units QHS with sliding scale insulin and diabetic diet ordered while inpatient    Acute renal failure superimposed on stage 3 chronic kidney disease  Patient's renal function continues to worsen today despite being in the hospital now >2days. This rise in creatinine is notably above her baseline of around 1.8. Accordingly we will be holding diuretics, giving some ivf appropriate for her concurrent HF in recent exacerbation and monitor to see how she does tomorrow    Continue gentle IVF hydration. Encourage PO hydration         Debility  -PT/OT consult      Essential hypertension  -Pt. Markedly HTN on presentation, SBP >200  -Suspect HTN contributing to worsening of HF, home med lisinopril 5 mg only. Holding in setting of LOREN  -Will start amlodipine 10 mg (avoid B-blockers due to bradycardia on presentation). Hydralazine PO ordered PRN        VTE Risk Mitigation (From admission, onward)         Ordered     IP VTE HIGH RISK PATIENT  Once         04/28/23 2238                Discharge Planning   GABE: 5/4/2023     Code Status: Full Code   Is the patient medically ready for discharge?: No    Reason for patient still in hospital (select all that apply): Patient trending condition  Discharge Plan A: Skilled Nursing Facility   Discharge Delays: None known at this time              Shavonne Westbrook MD  Department of Hospital Medicine   [facility] - Intensive Care ([place])

## 2023-05-04 NOTE — ASSESSMENT & PLAN NOTE
Patient's renal function continues to worsen today despite being in the hospital now >2days. This rise in creatinine is notably above her baseline of around 1.8. Accordingly we will be holding diuretics, giving some ivf appropriate for her concurrent HF in recent exacerbation and monitor to see how she does tomorrow    Continue gentle IVF hydration. Encourage PO hydration

## 2023-05-04 NOTE — PT/OT/SLP PROGRESS
"Physical Therapy Treatment    Patient Name:  Sherin Ortiz   MRN:  [id number]    Recommendations:     Discharge Recommendations: nursing facility, skilled  Discharge Equipment Recommendations: to be determined by next level of care  Barriers to discharge: None    Assessment:     Sherin Ortiz is a 78 y.o. female admitted with a medical diagnosis of Acute on chronic diastolic heart failure.  She presents with the following impairments/functional limitations: weakness, impaired endurance, impaired self care skills, impaired functional mobility, gait instability, impaired balance, decreased coordination, decreased upper extremity function, decreased lower extremity function, decreased safety awareness, pain, impaired cardiopulmonary response to activity, impaired skin Pt continues to required significant assistance for bed mob and static sitting at the EOB due to weakness, pain, fatigue, and fear of falling in spite of reassurance. Pt will cont to benefit from skilled PT intervention to address deficits and improve functional mobility.       Rehab Prognosis: Fair; patient would benefit from acute skilled PT services to address these deficits and reach maximum level of function.    Recent Surgery: * No surgery found *      Plan:     During this hospitalization, patient to be seen 3 x/week to address the identified rehab impairments via gait training, therapeutic activities, therapeutic exercises, neuromuscular re-education and progress toward the following goals:    Plan of Care Expires:  05/29/23    Subjective     Chief Complaint: "I'm tired," "my shoulder (R) hurts," Pt also reported fear of falling  Pain/Comfort:  Pain Rating 1:  (no rating reported)  Location - Side 1: Right  Location - Orientation 1: generalized  Location 1: knee  Pain Addressed 1: Cessation of Activity, Reposition  Pain Rating Post-Intervention 1:  (no rating provided)  Location - Side 2: Right  Location - Orientation 2: generalized  Location " 2: shoulder  Pain Addressed 2: Reposition  Pain Rating Post-Intervention 2:  (No rating provided)      Objective:     Communicated with nurse (Jerry) prior to session.  Patient found left sidelying with HOB elevated at 43* angle with PureWick, telemetry, pulse ox (continuous), peripheral IV upon PT entry to room.     General Precautions: Standard, fall  Orthopedic Precautions: N/A  Braces: N/A  Respiratory Status: Room air     Functional Mobility:  Bed Mobility:     Rolling Left:  maximal assistance of 2, with use of rail  Rolling Right: maximal assistance of 2, with use of rail  Scooting: maximal assistance and w/ draw sheet to EOB; Lateral scoot to the L, max A w/ min excursions;  Pt performs scooting to HOB with total A of 2 x2 scoot(s) (bed in trendelenburg position)  Bridging: maximal assistance of 2 (using drawsheet) to the R while sup in bed   Supine to Sit: maximal assistance of 1 for trunk elevation and B LE's, exiting on the L side, HOB elevated at 35* angle, increased time required  Sit to Supine: total assistance and of 2 persons for trunk and B LE's  Transfers:  Not attempted 2* safety concerns and with pt with fear of falling  Pt performs B LE AAROM ther ex's (hip abd/add and HS's) while sup in bed x12 reps with vc's  Pt tolerates sitting at the EOB with B UE support requiring SBA < > min A for trunk control due to post lean (10 min)        AM-PAC 6 CLICK MOBILITY  Turning over in bed (including adjusting bedclothes, sheets and blankets)?: 2  Sitting down on and standing up from a chair with arms (e.g., wheelchair, bedside commode, etc.): 1  Moving from lying on back to sitting on the side of the bed?: 2  Moving to and from a bed to a chair (including a wheelchair)?: 1  Need to walk in hospital room?: 1  Climbing 3-5 steps with a railing?: 1  Basic Mobility Total Score: 8       Treatment & Education:  Patient provided with daily orientation and goals of this PT session. They were educated to call for  assistance and to transfer with hospital staff only.  Also, pt was educated on the effects of prolonged immobility and the importance of performing OOB activity and exercises to promote healing and reduce recovery time    Patient left right sidelying with all lines intact, call button in reach, and nurse (Jerry) notified..    GOALS:   Multidisciplinary Problems       Physical Therapy Goals          Problem: Physical Therapy    Goal Priority Disciplines Outcome Goal Variances Interventions   Physical Therapy Goal     PT, PT/OT Ongoing, Progressing     Description: PT goals to be met by: 5/19/23    Patient will perform rolling each way with supervision.  Patient will perform supine <> sitting with supervision.  Patient will perform sit <> stand transitions with supervision using RW.  Patient will perform transfers from bed <> chair or BSC with supervision using RW.                       Time Tracking:     PT Received On: 05/04/23  PT Start Time: 1341     PT Stop Time: 1428  PT Total Time (min): 47 min     Billable Minutes: Therapeutic Activity 37 and Therapeutic Exercise 10 min    Treatment Type: Treatment  PT/PTA: PTA     Number of PTA visits since last PT visit: 1 05/04/2023

## 2023-05-04 NOTE — SUBJECTIVE & OBJECTIVE
Interval History: Cr slightly improved. Continue gentle IV hydration. Plan for placement in SNF when medically ready     Review of Systems  Objective:     Vital Signs (Most Recent):  Temp: 97.4 °F (36.3 °C) (05/04/23 0824)  Pulse: (!) 56 (05/04/23 1145)  Resp: (!) 24 (05/04/23 1145)  BP: (!) 127/57 (05/04/23 1145)  SpO2: 96 % (05/04/23 1145)   Vital Signs (24h Range):  Temp:  [97.4 °F (36.3 °C)-97.8 °F (36.6 °C)] 97.4 °F (36.3 °C)  Pulse:  [49-63] 56  Resp:  [16-27] 24  SpO2:  [93 %-99 %] 96 %  BP: (110-145)/(50-63) 127/57     Weight: 122.9 kg (270 lb 15.1 oz)  Body mass index is 42.44 kg/m².    Intake/Output Summary (Last 24 hours) at 5/4/2023 1204  Last data filed at 5/4/2023 1152  Gross per 24 hour   Intake 300 ml   Output 650 ml   Net -350 ml         Physical Exam  Vitals reviewed.   Constitutional:       General: She is not in acute distress.     Appearance: She is well-developed. She is obese.   HENT:      Head: Normocephalic and atraumatic.   Eyes:      Extraocular Movements: Extraocular movements intact.      Pupils: Pupils are equal, round, and reactive to light.   Neck:      Vascular: No JVD.      Trachea: No tracheal deviation.   Cardiovascular:      Rate and Rhythm: Normal rate and regular rhythm.      Heart sounds: No murmur heard.    No friction rub. No gallop.   Pulmonary:      Effort: No respiratory distress.      Breath sounds: Normal breath sounds. No wheezing or rales.   Abdominal:      General: Bowel sounds are normal. There is no distension.      Palpations: Abdomen is soft. There is no mass.      Tenderness: There is no abdominal tenderness.   Musculoskeletal:         General: No deformity.      Cervical back: Neck supple.      Right lower leg: No edema.      Left lower leg: No edema.   Lymphadenopathy:      Cervical: No cervical adenopathy.   Skin:     General: Skin is warm and dry.      Findings: No rash.   Neurological:      Mental Status: She is alert and oriented to person, place, and  time.

## 2023-05-05 VITALS
RESPIRATION RATE: 19 BRPM | BODY MASS INDEX: 42.53 KG/M2 | SYSTOLIC BLOOD PRESSURE: 142 MMHG | HEIGHT: 67 IN | OXYGEN SATURATION: 95 % | WEIGHT: 270.94 LBS | HEART RATE: 66 BPM | TEMPERATURE: 98 F | DIASTOLIC BLOOD PRESSURE: 63 MMHG

## 2023-05-05 LAB
ANION GAP SERPL CALC-SCNC: 7 MMOL/L (ref 8–16)
BUN SERPL-MCNC: 37 MG/DL (ref 8–23)
CALCIUM SERPL-MCNC: 8.7 MG/DL (ref 8.7–10.5)
CHLORIDE SERPL-SCNC: 103 MMOL/L (ref 95–110)
CO2 SERPL-SCNC: 29 MMOL/L (ref 23–29)
CREAT SERPL-MCNC: 2 MG/DL (ref 0.5–1.4)
EST. GFR  (NO RACE VARIABLE): 24.8 ML/MIN/1.73 M^2
GLUCOSE SERPL-MCNC: 159 MG/DL (ref 70–110)
POCT GLUCOSE: 145 MG/DL (ref 70–110)
POCT GLUCOSE: 157 MG/DL (ref 70–110)
POTASSIUM SERPL-SCNC: 4.1 MMOL/L (ref 3.5–5.1)
SODIUM SERPL-SCNC: 139 MMOL/L (ref 136–145)

## 2023-05-05 PROCEDURE — 99239 HOSP IP/OBS DSCHRG MGMT >30: CPT | Mod: ,,, | Performed by: STUDENT IN AN ORGANIZED HEALTH CARE EDUCATION/TRAINING PROGRAM

## 2023-05-05 PROCEDURE — 36415 COLL VENOUS BLD VENIPUNCTURE: CPT | Performed by: STUDENT IN AN ORGANIZED HEALTH CARE EDUCATION/TRAINING PROGRAM

## 2023-05-05 PROCEDURE — 25000003 PHARM REV CODE 250: Performed by: HOSPITALIST

## 2023-05-05 PROCEDURE — 99239 PR HOSPITAL DISCHARGE DAY,>30 MIN: ICD-10-PCS | Mod: ,,, | Performed by: STUDENT IN AN ORGANIZED HEALTH CARE EDUCATION/TRAINING PROGRAM

## 2023-05-05 PROCEDURE — 1111F PR DISCHARGE MEDS RECONCILED W/ CURRENT OUTPATIENT MED LIST: ICD-10-PCS | Mod: CPTII,,, | Performed by: STUDENT IN AN ORGANIZED HEALTH CARE EDUCATION/TRAINING PROGRAM

## 2023-05-05 PROCEDURE — 63700000 PHARM REV CODE 250 ALT 637 W/O HCPCS: Performed by: STUDENT IN AN ORGANIZED HEALTH CARE EDUCATION/TRAINING PROGRAM

## 2023-05-05 PROCEDURE — 25000003 PHARM REV CODE 250: Performed by: STUDENT IN AN ORGANIZED HEALTH CARE EDUCATION/TRAINING PROGRAM

## 2023-05-05 PROCEDURE — 1111F DSCHRG MED/CURRENT MED MERGE: CPT | Mod: CPTII,,, | Performed by: STUDENT IN AN ORGANIZED HEALTH CARE EDUCATION/TRAINING PROGRAM

## 2023-05-05 PROCEDURE — 63600175 PHARM REV CODE 636 W HCPCS: Performed by: STUDENT IN AN ORGANIZED HEALTH CARE EDUCATION/TRAINING PROGRAM

## 2023-05-05 PROCEDURE — 80048 BASIC METABOLIC PNL TOTAL CA: CPT | Performed by: STUDENT IN AN ORGANIZED HEALTH CARE EDUCATION/TRAINING PROGRAM

## 2023-05-05 RX ORDER — SODIUM CHLORIDE, SODIUM LACTATE, POTASSIUM CHLORIDE, CALCIUM CHLORIDE 600; 310; 30; 20 MG/100ML; MG/100ML; MG/100ML; MG/100ML
INJECTION, SOLUTION INTRAVENOUS CONTINUOUS
Status: ACTIVE | OUTPATIENT
Start: 2023-05-05 | End: 2023-05-05

## 2023-05-05 RX ORDER — DICLOFENAC SODIUM 10 MG/G
2 GEL TOPICAL DAILY
Status: DISCONTINUED | OUTPATIENT
Start: 2023-05-05 | End: 2023-05-05 | Stop reason: HOSPADM

## 2023-05-05 RX ORDER — DICLOFENAC SODIUM 10 MG/G
2 GEL TOPICAL DAILY
Status: ON HOLD
Start: 2023-05-05 | End: 2023-06-06 | Stop reason: HOSPADM

## 2023-05-05 RX ADMIN — ASPIRIN 81 MG CHEWABLE TABLET 81 MG: 81 TABLET CHEWABLE at 08:05

## 2023-05-05 RX ADMIN — SODIUM BICARBONATE 650 MG: 650 TABLET ORAL at 08:05

## 2023-05-05 RX ADMIN — DICLOFENAC SODIUM 2 G: 10 GEL TOPICAL at 01:05

## 2023-05-05 RX ADMIN — SODIUM CHLORIDE, POTASSIUM CHLORIDE, SODIUM LACTATE AND CALCIUM CHLORIDE: 600; 310; 30; 20 INJECTION, SOLUTION INTRAVENOUS at 08:05

## 2023-05-05 RX ADMIN — CEFTRIAXONE 1 G: 1 INJECTION, POWDER, FOR SOLUTION INTRAMUSCULAR; INTRAVENOUS at 09:05

## 2023-05-05 RX ADMIN — PANTOPRAZOLE SODIUM 40 MG: 40 TABLET, DELAYED RELEASE ORAL at 08:05

## 2023-05-05 RX ADMIN — AZITHROMYCIN MONOHYDRATE 500 MG: 250 TABLET ORAL at 08:05

## 2023-05-05 RX ADMIN — INSULIN ASPART 3 UNITS: 100 INJECTION, SOLUTION INTRAVENOUS; SUBCUTANEOUS at 09:05

## 2023-05-05 RX ADMIN — AMLODIPINE BESYLATE 10 MG: 10 TABLET ORAL at 08:05

## 2023-05-05 NOTE — HOSPITAL COURSE
Problem based management as below:     * Acute on chronic diastolic heart failure  Pt. With edema, HANDY, and PND. , CXR with pulm edema and L sided effusion consistent with HF exacerbation  HF pathway started .Cardiology consulted. Received lasix 40mg BID. Held further diuresis given LOREN due to over diuresis.    Pneumonia due to infectious organism  CXR with possible LLL consolidation. Empirically treating for CAP     New onset Atrial fibrillation  Cardiology consulted. Did not start BB given bradycardia with HR 50's.Discussed risk and benefits of Ac with patient. Does not want to be on AC given risk of fall and hx of GI bleed. May be candidate for watchman, refer to Dr. Figueroa with EP clinic on discharge for outpatient evaluation       Acute renal failure superimposed on stage 3 chronic kidney disease  Patient's renal function continued to worsen today despite being in the hospital now >2days. This rise in creatinine is notably above her baseline of around 1.8. Accordingly held diuretics, gave some ivf appropriate for her concurrent HF in recent exacerbation with improvement in renal function. Repeat BMP in 3 days       Debility  PT/OT consulted. Rec placement in SNF.     Patient is currently medically and HDS. She is being d/c to SNF.  FU with PCP and EP.Plan of care discussed with patient, verbalized understanding. All questions were answered.

## 2023-05-05 NOTE — DISCHARGE SUMMARY
[facility] - Intensive Care ([place] [address])  [facility] Medicine  Discharge Summary      Patient Name: Sherin Ortiz  MRN: [id number]  SHAHNAZ: [id number]  Patient Class: IP- Inpatient  Admission Date: 4/28/2023  Hospital Length of Stay: 3 days  Discharge Date and Time:  05/05/2023 10:25 AM  Attending Physician: Shavonne Westbrook MD   Discharging Provider: Shavonne Westbrook MD  Primary Care Provider: Darby Baum MD  [facility]: [facility] Shavonne Westbrook MD  Primary Care Team: [facility]    HPI:   75 yo F with PMHx CKD3, HFpEF, HTN, DM2, and HLD who presents to he ED complaining of progressively worsening SOB and weakness x 1 week. Pt. Reports noting decreased exercise tolerance and fatigue over the last week. She has had difficulty completing her ADL's around the house. Additionally she reports difficulty sleeping because she gets SOB when she lies flat. Today, the patient felt so SOB and unsteady on her feet that she came to the ED. She denies any cough, fevers, chills, chest pain, palpitations, nausea, or vomiting. No reported worsening edema (pt. Has venous stasis at baseline)      * No surgery found *      Hospital Course:   Problem based management as below:     * Acute on chronic diastolic heart failure  Pt. With edema, HANDY, and PND. , CXR with pulm edema and L sided effusion consistent with HF exacerbation  HF pathway started .Cardiology consulted. Received lasix 40mg BID. Held further diuresis given LOREN due to over diuresis.    Pneumonia due to infectious organism  CXR with possible LLL consolidation. Empirically treating for CAP     New onset Atrial fibrillation  Cardiology consulted. Did not start BB given bradycardia with HR 50's.Discussed risk and benefits of Ac with patient. Does not want to be on AC given risk of fall and hx of GI bleed. May be candidate for watchman, refer to Dr. Figueroa with EP clinic on discharge for outpatient evaluation       Acute renal failure superimposed on  stage 3 chronic kidney disease  Patient's renal function continued to worsen today despite being in the hospital now >2days. This rise in creatinine is notably above her baseline of around 1.8. Accordingly held diuretics, gave some ivf appropriate for her concurrent HF in recent exacerbation with improvement in renal function. Repeat BMP in 3 days       Debility  PT/OT consulted. Rec placement in SNF.     Patient is currently medically and HDS. She is being d/c to SNF.  FU with PCP and EP.Plan of care discussed with patient, verbalized understanding. All questions were answered.         Goals of Care Treatment Preferences:  Code Status: Full Code      Consults:   Consults (From admission, onward)        Status Ordering Provider     Inpatient consult to Cardiology  Once        Provider:  (Not yet assigned)    Completed RADHA VANEGAS     Inpatient consult to Social Work/Case Management  Once        Provider:  (Not yet assigned)    Completed DARLENE NUNEZ     Inpatient consult to Registered Dietitian/Nutritionist  Once        Provider:  (Not yet assigned)    Completed DARLENE NUNEZ          No new Assessment & Plan notes have been filed under this hospital service since the last note was generated.  Service: Hospital Medicine    Final Active Diagnoses:    Diagnosis Date Noted POA    PRINCIPAL PROBLEM:  Acute on chronic diastolic heart failure [I50.33] 04/28/2023 Unknown    Pneumonia due to infectious organism [J18.9] 04/30/2023 Unknown    Atrial fibrillation [I48.91] 04/28/2023 Unknown    Type 2 diabetes mellitus, with long-term current use of insulin [E11.9, Z79.4] 01/14/2021 Not Applicable    Acute renal failure superimposed on stage 3 chronic kidney disease [N17.9, N18.30] 03/22/2019 Unknown    Debility [R53.81] 12/19/2012 Yes     Chronic    Essential hypertension [I10] 11/14/2012 Yes     Chronic      Problems Resolved During this Admission:       Discharged Condition: stable    Disposition: Home or Self  Care    Follow Up:   Follow-up Information     Darby Baum MD Follow up in 3 day(s).    Specialty: Internal Medicine  Contact information:  [address]  [facility] [address]  [phone]                       Patient Instructions:      BASIC METABOLIC PANEL   Standing Status: Future Standing Exp. Date: 07/03/24     Ambulatory referral/consult to Outpatient Case Management   Referral Priority: Routine Referral Type: Consultation   Referral Reason: Specialty Services Required   Number of Visits Requested: 1     Ambulatory referral/consult to Cardiac Electrophysiology   Standing Status: Future   Referral Priority: Routine Referral Type: Consultation   Referral Reason: Specialty Services Required   Referred to Provider: AGUSTINA SUAZO Requested Specialty: Cardiology   Number of Visits Requested: 1     Ambulatory referral/consult to Heart Failure Transitional Care Clinic   Standing Status: Future   Referral Priority: Routine Referral Type: Consultation   Referral Reason: Specialty Services Required   Requested Specialty: Cardiology   Number of Visits Requested: 1     Call MD for:  temperature >100.4     Call MD for:  persistent nausea and vomiting or diarrhea     Call MD for:  severe uncontrolled pain     Call MD for:  redness, tenderness, or signs of infection (pain, swelling, redness, odor or green/yellow discharge around incision site)     Call MD for:  difficulty breathing or increased cough     Call MD for:  severe persistent headache     Call MD for:  worsening rash     Call MD for:  persistent dizziness, light-headedness, or visual disturbances     Call MD for:  increased confusion or weakness       Significant Diagnostic Studies: N/A    Pending Diagnostic Studies:     None         Medications:  Reconciled Home Medications:      Medication List      START taking these medications    amLODIPine 10 MG tablet  Commonly known as: NORVASC  Take 1 tablet (10 mg total) by mouth once daily.    "  diclofenac sodium 1 % Gel  Commonly known as: VOLTAREN  Apply 2 g topically once daily. Apply to L Thumb as needed        CONTINUE taking these medications    atorvastatin 40 MG tablet  Commonly known as: LIPITOR  Take 1 tablet (40 mg total) by mouth every evening.     SHANNAN CHEWABLE ASPIRIN 81 MG Chew  Generic drug: aspirin  Chew 1 tablet (81 mg total) by mouth once daily.     blood sugar diagnostic Strp  Commonly known as: TRUE METRIX GLUCOSE TEST STRIP  TEST BLOOD SUGAR TWICE DAILY     insulin glargine 100 unit/mL injection  Commonly known as: LANTUS U-100 INSULIN  Inject 15 Units into the skin every evening.     lancets 33 gauge Misc  Commonly known as: TRUEPLUS LANCETS  TEST TWO TIMES DAILY     lisinopriL 5 MG tablet  Commonly known as: PRINIVIL,ZESTRIL  Take 1 tablet (5 mg total) by mouth once daily.     pantoprazole 40 MG tablet  Commonly known as: PROTONIX  Take 1 tablet (40 mg total) by mouth once daily.     sodium bicarbonate 650 MG tablet  Take 1 tablet (650 mg total) by mouth 2 (two) times daily.        STOP taking these medications    DROPLET INSULIN SYR(HALF UNIT) 0.5 mL 30 gauge x 1/2" Syrg  Generic drug: insulin syr/ndl U100 half yesenia     sodium zirconium cyclosilicate 5 gram packet  Commonly known as: ECHO            Indwelling Lines/Drains at time of discharge:   Lines/Drains/Airways     Drain  Duration           Female External Urinary Catheter 04/30/23 1238 4 days                Time spent on the discharge of patient: 35 minutes         Shavonne Westbrook MD  Department of Hospital Medicine  [facility] Intensive Care ([place] [address])  "

## 2023-05-05 NOTE — PLAN OF CARE
No acute events overnight, VSS and Pt progressing toward goals. Will continue to monitor.         Problem: Adult Inpatient Plan of Care  Goal: Plan of Care Review  Outcome: Ongoing, Progressing  Goal: Patient-Specific Goal (Individualized)  Outcome: Ongoing, Progressing  Goal: Absence of Hospital-Acquired Illness or Injury  Outcome: Ongoing, Progressing  Goal: Optimal Comfort and Wellbeing  Outcome: Ongoing, Progressing  Goal: Readiness for Transition of Care  Outcome: Ongoing, Progressing     Problem: Bariatric Environmental Safety  Goal: Safety Maintained with Care  Outcome: Ongoing, Progressing     Problem: Diabetes Comorbidity  Goal: Blood Glucose Level Within Targeted Range  Outcome: Ongoing, Progressing     Problem: Fluid and Electrolyte Imbalance (Acute Kidney Injury/Impairment)  Goal: Fluid and Electrolyte Balance  Outcome: Ongoing, Progressing     Problem: Oral Intake Inadequate (Acute Kidney Injury/Impairment)  Goal: Optimal Nutrition Intake  Outcome: Ongoing, Progressing     Problem: Renal Function Impairment (Acute Kidney Injury/Impairment)  Goal: Effective Renal Function  Outcome: Ongoing, Progressing     Problem: Impaired Wound Healing  Goal: Optimal Wound Healing  Outcome: Ongoing, Progressing     Problem: Skin Injury Risk Increased  Goal: Skin Health and Integrity  Outcome: Ongoing, Progressing     Problem: Fluid Imbalance (Pneumonia)  Goal: Fluid Balance  Outcome: Ongoing, Progressing     Problem: Infection (Pneumonia)  Goal: Resolution of Infection Signs and Symptoms  Outcome: Ongoing, Progressing     Problem: Respiratory Compromise (Pneumonia)  Goal: Effective Oxygenation and Ventilation  Outcome: Ongoing, Progressing

## 2023-05-05 NOTE — CARE UPDATE
"RAPID RESPONSE NURSE CHART REVIEW       Chart Reviewed: 05/05/2023, 3:30 PM    MRN: [id number]  Bed: 01498/01259 A    Dx: Acute on chronic diastolic heart failure    Sherin Ortiz has a past medical history of Allergy, Asteroid hyalosis - Left Eye, Benign essential hypertension, Cataract, Chronic kidney disease (CKD), stage III (moderate), Diabetic peripheral neuropathy associated with type 2 diabetes mellitus, Gait disorder, Hyperlipidemia, Iritis - Both Eyes, Kidney stone, Lymphedema, Morbid obesity with BMI of 40.0-44.9, adult, Nephrolithiasis, NS (nuclear sclerosis), Nuclear sclerosis - Both Eyes, Preseptal cellulitis - Right Eye, Proliferative diabetic retinopathy - Both Eyes, Proliferative diabetic retinopathy, both eyes, PSC (posterior subcapsular cataract) - Both Eyes, S/P hernia repair, TIA (transient ischemic attack), Tinea pedis, Type 2 diabetes mellitus with diabetic polyneuropathy, with long-term current use of insulin, Type 2 diabetes mellitus with renal manifestations, controlled, Type 2 diabetes, controlled, with moderate nonproliferative diabetic retinopathy without macular edema, Ulcer of left lower extremity, limited to breakdown of skin, Unspecified cerebral artery occlusion with cerebral infarction, Unspecified venous (peripheral) insufficiency, Ureteral stone with hydronephrosis, UTI (lower urinary tract infection), Vaginal infection, and Vertical heterotropia - Both Eyes.    Last VS: BP (!) 142/63 (BP Location: Left arm, Patient Position: Lying)   Pulse (!) 137   Temp 97.6 °F (36.4 °C) (Oral)   Resp 19   Ht 5' 7" (1.702 m)   Wt 122.9 kg (270 lb 15.1 oz)   LMP  (LMP Unknown) Comment: patient refused to weigh/wounds to legs and toes   SpO2 99%   BMI 42.44 kg/m²     24H Vital Sign Range:  Temp:  [97.6 °F (36.4 °C)-98.1 °F (36.7 °C)]   Pulse:  []   Resp:  [18-25]   BP: (135-146)/(59-79)   SpO2:  [94 %-99 %]     Level of Consciousness (AVPU): alert    Recent Labs     05/03/23  0327 " 05/04/23  0338   WBC 4.40 3.59*   HGB 8.6* 9.3*   HCT 29.5* 31.7*    163       Recent Labs     05/03/23  0327 05/04/23  0338 05/05/23  0640    137 139   K 4.2 4.2 4.1    103 103   CO2 29 25 29   CREATININE 2.4* 2.3* 2.0*   * 82 159*        No results for input(s): PH, PCO2, PO2, HCO3, POCSATURATED, BE in the last 72 hours.     OXYGEN:             MEWS score: 1    bedside RNIvy   contacted about tachycardia. Tachycardia was artifact from patient moving and is being discharged today. No concerns verbalized at this time. Instructed to call [phone] for further concerns or assistance.    Mohamud Mckoy RN

## 2023-05-05 NOTE — PLAN OF CARE
NURSING HOME ORDERS    05/05/2023  [facility]  JONAH [facility]Y - INTENSIVE CARE ([place])  [address]  [facility] [address]  Dept: [phone]  Loc: [phone]     Admit to Nursing Home:  Home or Self Care    Diagnoses:  Active Hospital Problems    Diagnosis  POA    *Acute on chronic diastolic heart failure [I50.33]  Unknown    Pneumonia due to infectious organism [J18.9]  Unknown    Atrial fibrillation [I48.91]  Unknown    Type 2 diabetes mellitus, with long-term current use of insulin [E11.9, Z79.4]  Not Applicable    Acute renal failure superimposed on stage 3 chronic kidney disease [N17.9, N18.30]  Unknown    Debility [R53.81]  Yes     Chronic    Essential hypertension [I10]  Yes     Chronic      Resolved Hospital Problems   No resolved problems to display.       Patient is homebound due to:  Acute on chronic diastolic heart failure    Allergies:  Review of patient's allergies indicates:   Allergen Reactions    Penicillins Hives     Other reaction(s): Hives  Patient has received cefdinir, ceftriaxone, cefazolin and cefepime in the past with no documented reactions    Sulfa (sulfonamide antibiotics) Other (See Comments)     Eyad, pt states her doctor told her the shakes were possibly caused by an allergy to sulfa       Vitals:  Routine    Diet: cardiac diet    Activities:   Activity as tolerated    Goals of Care Treatment Preferences:  Code Status: Full Code          Nursing Precautions:  Fall    Consults:   PT to evaluate and treat- 5 times a week and OT to evaluate and treat- 5 times a week                 Medications: Discontinue all previous medication orders, if any. See new list below.     Medication List        START taking these medications      amLODIPine 10 MG tablet  Commonly known as: NORVASC  Take 1 tablet (10 mg total) by mouth once daily.     diclofenac sodium 1 % Gel  Commonly known as: VOLTAREN  Apply 2 g topically once daily. Apply to L Thumb as needed         "    CONTINUE taking these medications      atorvastatin 40 MG tablet  Commonly known as: LIPITOR  Take 1 tablet (40 mg total) by mouth every evening.     SHANNAN CHEWABLE ASPIRIN 81 MG Chew  Generic drug: aspirin  Chew 1 tablet (81 mg total) by mouth once daily.     blood sugar diagnostic Strp  Commonly known as: TRUE METRIX GLUCOSE TEST STRIP  TEST BLOOD SUGAR TWICE DAILY     insulin glargine 100 unit/mL injection  Commonly known as: LANTUS U-100 INSULIN  Inject 15 Units into the skin every evening.     lancets 33 gauge Misc  Commonly known as: TRUEPLUS LANCETS  TEST TWO TIMES DAILY     lisinopriL 5 MG tablet  Commonly known as: PRINIVIL,ZESTRIL  Take 1 tablet (5 mg total) by mouth once daily.     pantoprazole 40 MG tablet  Commonly known as: PROTONIX  Take 1 tablet (40 mg total) by mouth once daily.     sodium bicarbonate 650 MG tablet  Take 1 tablet (650 mg total) by mouth 2 (two) times daily.            STOP taking these medications      DROPLET INSULIN SYR(HALF UNIT) 0.5 mL 30 gauge x 1/2" Syrg  Generic drug: insulin syr/ndl U100 half yesenia     sodium zirconium cyclosilicate 5 gram packet  Commonly known as: [facility]                Immunizations Administered as of 5/5/2023       No immunizations on file.        Some patients may experience side effects after vaccination.  These may include fever, headache, muscle or joint aches.  Most symptoms resolve with 24-48 hours and do not require urgent medical evaluation unless they persist for more than 72 hours or symptoms are concerning for an unrelated medical condition.          _________________________________  Shavonne Westbrook MD  05/05/2023    "

## 2023-05-05 NOTE — PLAN OF CARE
[facility] - Intensive Care ([facility])  Discharge Final Note    Primary Care Provider: Darby Baum MD    Expected Discharge Date: 5/5/2023    Final Discharge Note (most recent)       Final Note - 05/05/23 1453          Final Note    Assessment Type Final Discharge Note (P)      Anticipated Discharge Disposition Skilled Nursing Facility (P)                      Important Message from Medicare  Important Message from Medicare regarding Discharge Appeal Rights: Explained to patient/caregiver, Signed/date by patient/caregiver     Date IMM was signed: 05/04/23  Time IMM was signed: 0810    Contact Info       Darby Baum MD   Specialty: Internal Medicine   Relationship: PCP - General    [address]  [facility] [address]   Phone: [phone]       Next Steps: Follow up in 3 day(s)        Patient is discharging today to [facility].  has arranged transport for 3:40, an patient will transport by stretcher. Nurse call report to [phone].  has informed patient daughter of this information.       Future Appointments   Date Time Provider Department Center   5/8/2023  8:20 AM LAB, APPOINTMENT [facility] LAB VNP [facility]   5/8/2023  9:00 AM Randi Winters PA-C Critical access hospital   5/8/2023  9:30 AM [facility] HEART FAILURE NURSE Critical access hospital   5/16/2023  2:30 PM Ginger Renteria MD [facility] UROLOGY [facility]   5/26/2023  2:30 PM Jody Murray NP [facility] NEPHRO [facility]   6/29/2023  8:40 AM Darby Baum MD [facility] IM [facility] PCW     Leelee Seals LMSW  [facility]   [id number]

## 2023-05-05 NOTE — PLAN OF CARE
[place] has sent orders and additional clinicals to [facility] will be arranging transport soon.        Leelee Seals LMSW  [facility]   [phone]

## 2023-05-08 ENCOUNTER — DOCUMENTATION ONLY (OUTPATIENT)
Dept: CARDIOLOGY | Facility: CLINIC | Age: 78
End: 2023-05-08

## 2023-05-08 ENCOUNTER — TELEPHONE (OUTPATIENT)
Dept: CARDIOLOGY | Facility: CLINIC | Age: 78
End: 2023-05-08

## 2023-05-08 ENCOUNTER — PATIENT OUTREACH (OUTPATIENT)
Dept: ADMINISTRATIVE | Facility: CLINIC | Age: 78
End: 2023-05-08
Payer: MEDICARE

## 2023-05-08 ENCOUNTER — EXTERNAL HOME HEALTH (OUTPATIENT)
Dept: HOME HEALTH SERVICES | Facility: HOSPITAL | Age: 78
End: 2023-05-08
Payer: MEDICARE

## 2023-05-08 NOTE — TELEPHONE ENCOUNTER
Called pt's daughter to schedule f/u with [facility]. Pt is in rehab facility in [place]. Pt's daughter will reach out when pt returns home.

## 2023-05-08 NOTE — PROGRESS NOTES
[facility] ([facility]) Team notified of pt referral via Ambulatory Referral to Heart Failure Transitional Care ([id number]).    Patient screened today 5-8-2023 by provider and RN for enrollment to program.      Pt was deemed not a candidate for enrollment at this time related to patient has follow up barrier: pt is being discharged to non-[facility] skilled nursing/rehab facility, nursing home or other facility that will be unable to assist pt with participation.  D/C to [facility] 5-5 transport @ 3:40 see note Leelee Seals LMSW    Pt will require additional follow up planning per primary team.     If pt status, diagnosis, or treatment plan changes , please place AMB referral to Heart Failure Transitional Care Clinic ([id number]) for [facility] enrollment re-evalution.

## 2023-05-08 NOTE — PATIENT INSTRUCTIONS
Sudden weight gain (more than 2 pounds in 1 day or 5 pounds in 1 week, Trouble breathing not related to being active, New or increased swelling of your legs or ankles, Swelling or pain in your abdomen, more tired than usual. Increasing shortness of breath or rapid breathing (over 25 breaths/minute); Coughing up blood or increasing chest pain with breathing; Fever of 100.4°F (38°C) oral or higher, not better with fever medication; Increasing weakness, dizziness or fainting; Increasing thirst or dry mouth; Sinus pain, headache or a stiff neck; Chest pain not caused by coughing

## 2023-05-08 NOTE — PROGRESS NOTES
C3 nurse attempted to contact Fior Nathenri for a TCC post hospital discharge follow up call. No answer. The patient does not have a scheduled HOSFU appointment, and the pt does not have an [facility] PCP.

## 2023-05-15 ENCOUNTER — TELEPHONE (OUTPATIENT)
Dept: CARDIOLOGY | Facility: CLINIC | Age: 78
End: 2023-05-15
Payer: MEDICARE

## 2023-05-15 DIAGNOSIS — I87.2 VENOUS INSUFFICIENCY OF BOTH LOWER EXTREMITIES: Primary | ICD-10-CM

## 2023-05-15 NOTE — TELEPHONE ENCOUNTER
RN spoke with Veronica nurse at [facility]. Pt to be transported to [facility] by SNF.

## 2023-05-22 ENCOUNTER — PATIENT MESSAGE (OUTPATIENT)
Dept: NEPHROLOGY | Facility: CLINIC | Age: 78
End: 2023-05-22
Payer: MEDICARE

## 2023-05-22 ENCOUNTER — LAB VISIT (OUTPATIENT)
Dept: LAB | Facility: HOSPITAL | Age: 78
End: 2023-05-22
Payer: MEDICARE

## 2023-05-22 ENCOUNTER — OFFICE VISIT (OUTPATIENT)
Dept: CARDIOLOGY | Facility: CLINIC | Age: 78
End: 2023-05-22
Payer: MEDICARE

## 2023-05-22 ENCOUNTER — OUTPATIENT CASE MANAGEMENT (OUTPATIENT)
Dept: ADMINISTRATIVE | Facility: OTHER | Age: 78
End: 2023-05-22
Payer: MEDICARE

## 2023-05-22 VITALS
DIASTOLIC BLOOD PRESSURE: 70 MMHG | HEIGHT: 67 IN | BODY MASS INDEX: 41.75 KG/M2 | HEART RATE: 55 BPM | WEIGHT: 266 LBS | SYSTOLIC BLOOD PRESSURE: 150 MMHG

## 2023-05-22 DIAGNOSIS — E66.01 OBESITY, MORBID (MORE THAN 100 LBS OVER IDEAL WEIGHT OR BMI > 40): ICD-10-CM

## 2023-05-22 DIAGNOSIS — I50.30 HEART FAILURE WITH PRESERVED EJECTION FRACTION, UNSPECIFIED HF CHRONICITY: ICD-10-CM

## 2023-05-22 DIAGNOSIS — R53.81 DEBILITY: Chronic | ICD-10-CM

## 2023-05-22 DIAGNOSIS — D61.818 PANCYTOPENIA: ICD-10-CM

## 2023-05-22 DIAGNOSIS — L97.929 VENOUS ULCER OF BOTH LOWER EXTREMITIES WITH VARICOSE VEINS: ICD-10-CM

## 2023-05-22 DIAGNOSIS — I77.9 PAOD (PERIPHERAL ARTERIAL OCCLUSIVE DISEASE): ICD-10-CM

## 2023-05-22 DIAGNOSIS — I48.91 ATRIAL FIBRILLATION, UNSPECIFIED TYPE: ICD-10-CM

## 2023-05-22 DIAGNOSIS — E78.5 HYPERLIPIDEMIA LDL GOAL <100: Chronic | ICD-10-CM

## 2023-05-22 DIAGNOSIS — I83.019 VENOUS ULCER OF BOTH LOWER EXTREMITIES WITH VARICOSE VEINS: ICD-10-CM

## 2023-05-22 DIAGNOSIS — L97.919 VENOUS ULCER OF BOTH LOWER EXTREMITIES WITH VARICOSE VEINS: ICD-10-CM

## 2023-05-22 DIAGNOSIS — I83.029 VENOUS ULCER OF BOTH LOWER EXTREMITIES WITH VARICOSE VEINS: ICD-10-CM

## 2023-05-22 DIAGNOSIS — Z79.4 TYPE 2 DIABETES MELLITUS WITH DIABETIC POLYNEUROPATHY, WITH LONG-TERM CURRENT USE OF INSULIN: Chronic | ICD-10-CM

## 2023-05-22 DIAGNOSIS — I50.30 HEART FAILURE WITH PRESERVED EJECTION FRACTION, UNSPECIFIED HF CHRONICITY: Primary | ICD-10-CM

## 2023-05-22 DIAGNOSIS — I10 ESSENTIAL HYPERTENSION: Chronic | ICD-10-CM

## 2023-05-22 DIAGNOSIS — I70.0 AORTIC ATHEROSCLEROSIS: ICD-10-CM

## 2023-05-22 DIAGNOSIS — I87.2 VENOUS STASIS DERMATITIS OF BOTH LOWER EXTREMITIES: Chronic | ICD-10-CM

## 2023-05-22 DIAGNOSIS — E11.42 TYPE 2 DIABETES MELLITUS WITH DIABETIC POLYNEUROPATHY, WITH LONG-TERM CURRENT USE OF INSULIN: Chronic | ICD-10-CM

## 2023-05-22 DIAGNOSIS — I89.0 LYMPHEDEMA: ICD-10-CM

## 2023-05-22 LAB
ALBUMIN SERPL BCP-MCNC: 2.5 G/DL (ref 3.5–5.2)
ALP SERPL-CCNC: 214 U/L (ref 55–135)
ALT SERPL W/O P-5'-P-CCNC: 26 U/L (ref 10–44)
ANION GAP SERPL CALC-SCNC: 11 MMOL/L (ref 8–16)
AST SERPL-CCNC: 36 U/L (ref 10–40)
BASOPHILS # BLD AUTO: 0.01 K/UL (ref 0–0.2)
BASOPHILS NFR BLD: 0.4 % (ref 0–1.9)
BILIRUB SERPL-MCNC: 0.5 MG/DL (ref 0.1–1)
BNP SERPL-MCNC: 171 PG/ML (ref 0–99)
BUN SERPL-MCNC: 28 MG/DL (ref 8–23)
CALCIUM SERPL-MCNC: 9.9 MG/DL (ref 8.7–10.5)
CHLORIDE SERPL-SCNC: 110 MMOL/L (ref 95–110)
CO2 SERPL-SCNC: 26 MMOL/L (ref 23–29)
CREAT SERPL-MCNC: 1.8 MG/DL (ref 0.5–1.4)
DIFFERENTIAL METHOD: ABNORMAL
EOSINOPHIL # BLD AUTO: 0 K/UL (ref 0–0.5)
EOSINOPHIL NFR BLD: 0.7 % (ref 0–8)
ERYTHROCYTE [DISTWIDTH] IN BLOOD BY AUTOMATED COUNT: 17.7 % (ref 11.5–14.5)
EST. GFR  (NO RACE VARIABLE): 28.1 ML/MIN/1.73 M^2
GLUCOSE SERPL-MCNC: 141 MG/DL (ref 70–110)
HCT VFR BLD AUTO: 38.7 % (ref 37–48.5)
HGB BLD-MCNC: 11.2 G/DL (ref 12–16)
IMM GRANULOCYTES # BLD AUTO: 0 K/UL (ref 0–0.04)
IMM GRANULOCYTES NFR BLD AUTO: 0 % (ref 0–0.5)
LYMPHOCYTES # BLD AUTO: 0.7 K/UL (ref 1–4.8)
LYMPHOCYTES NFR BLD: 25.7 % (ref 18–48)
MAGNESIUM SERPL-MCNC: 2 MG/DL (ref 1.6–2.6)
MCH RBC QN AUTO: 24 PG (ref 27–31)
MCHC RBC AUTO-ENTMCNC: 28.9 G/DL (ref 32–36)
MCV RBC AUTO: 83 FL (ref 82–98)
MONOCYTES # BLD AUTO: 0.2 K/UL (ref 0.3–1)
MONOCYTES NFR BLD: 5.7 % (ref 4–15)
NEUTROPHILS # BLD AUTO: 1.9 K/UL (ref 1.8–7.7)
NEUTROPHILS NFR BLD: 67.5 % (ref 38–73)
NRBC BLD-RTO: 0 /100 WBC
PHOSPHATE SERPL-MCNC: 3.8 MG/DL (ref 2.7–4.5)
PLATELET # BLD AUTO: 116 K/UL (ref 150–450)
PMV BLD AUTO: 9.7 FL (ref 9.2–12.9)
POTASSIUM SERPL-SCNC: 5.2 MMOL/L (ref 3.5–5.1)
PROT SERPL-MCNC: 7.1 G/DL (ref 6–8.4)
RBC # BLD AUTO: 4.66 M/UL (ref 4–5.4)
SODIUM SERPL-SCNC: 147 MMOL/L (ref 136–145)
WBC # BLD AUTO: 2.8 K/UL (ref 3.9–12.7)

## 2023-05-22 PROCEDURE — 84100 ASSAY OF PHOSPHORUS: CPT

## 2023-05-22 PROCEDURE — 1159F MED LIST DOCD IN RCRD: CPT | Mod: CPTII,S$GLB,,

## 2023-05-22 PROCEDURE — 99499 RISK ADDL DX/OHS AUDIT: ICD-10-PCS | Mod: S$GLB,,,

## 2023-05-22 PROCEDURE — 83735 ASSAY OF MAGNESIUM: CPT

## 2023-05-22 PROCEDURE — 1111F PR DISCHARGE MEDS RECONCILED W/ CURRENT OUTPATIENT MED LIST: ICD-10-PCS | Mod: CPTII,S$GLB,,

## 2023-05-22 PROCEDURE — 1160F PR REVIEW ALL MEDS BY PRESCRIBER/CLIN PHARMACIST DOCUMENTED: ICD-10-PCS | Mod: CPTII,S$GLB,,

## 2023-05-22 PROCEDURE — 1159F PR MEDICATION LIST DOCUMENTED IN MEDICAL RECORD: ICD-10-PCS | Mod: CPTII,S$GLB,,

## 2023-05-22 PROCEDURE — 3078F DIAST BP <80 MM HG: CPT | Mod: CPTII,S$GLB,,

## 2023-05-22 PROCEDURE — 1100F PTFALLS ASSESS-DOCD GE2>/YR: CPT | Mod: CPTII,S$GLB,,

## 2023-05-22 PROCEDURE — 3078F PR MOST RECENT DIASTOLIC BLOOD PRESSURE < 80 MM HG: ICD-10-PCS | Mod: CPTII,S$GLB,,

## 2023-05-22 PROCEDURE — 1126F AMNT PAIN NOTED NONE PRSNT: CPT | Mod: CPTII,S$GLB,,

## 2023-05-22 PROCEDURE — 99999 PR PBB SHADOW E&M-EST. PATIENT-LVL IV: CPT | Mod: PBBFAC,,,

## 2023-05-22 PROCEDURE — 99214 PR OFFICE/OUTPT VISIT, EST, LEVL IV, 30-39 MIN: ICD-10-PCS | Mod: S$GLB,,,

## 2023-05-22 PROCEDURE — 85025 COMPLETE CBC W/AUTO DIFF WBC: CPT

## 2023-05-22 PROCEDURE — 3288F PR FALLS RISK ASSESSMENT DOCUMENTED: ICD-10-PCS | Mod: CPTII,S$GLB,,

## 2023-05-22 PROCEDURE — 1126F PR PAIN SEVERITY QUANTIFIED, NO PAIN PRESENT: ICD-10-PCS | Mod: CPTII,S$GLB,,

## 2023-05-22 PROCEDURE — 99999 PR PBB SHADOW E&M-EST. PATIENT-LVL IV: ICD-10-PCS | Mod: PBBFAC,,,

## 2023-05-22 PROCEDURE — 3077F SYST BP >= 140 MM HG: CPT | Mod: CPTII,S$GLB,,

## 2023-05-22 PROCEDURE — 83880 ASSAY OF NATRIURETIC PEPTIDE: CPT

## 2023-05-22 PROCEDURE — 80053 COMPREHEN METABOLIC PANEL: CPT

## 2023-05-22 PROCEDURE — 1160F RVW MEDS BY RX/DR IN RCRD: CPT | Mod: CPTII,S$GLB,,

## 2023-05-22 PROCEDURE — 99499 UNLISTED E&M SERVICE: CPT | Mod: S$GLB,,,

## 2023-05-22 PROCEDURE — 36415 COLL VENOUS BLD VENIPUNCTURE: CPT

## 2023-05-22 PROCEDURE — 1100F PR PT FALLS ASSESS DOC 2+ FALLS/FALL W/INJURY/YR: ICD-10-PCS | Mod: CPTII,S$GLB,,

## 2023-05-22 PROCEDURE — 3077F PR MOST RECENT SYSTOLIC BLOOD PRESSURE >= 140 MM HG: ICD-10-PCS | Mod: CPTII,S$GLB,,

## 2023-05-22 PROCEDURE — 3288F FALL RISK ASSESSMENT DOCD: CPT | Mod: CPTII,S$GLB,,

## 2023-05-22 PROCEDURE — 99214 OFFICE O/P EST MOD 30 MIN: CPT

## 2023-05-22 PROCEDURE — 99214 OFFICE O/P EST MOD 30 MIN: CPT | Mod: S$GLB,,,

## 2023-05-22 PROCEDURE — 1111F DSCHRG MED/CURRENT MED MERGE: CPT | Mod: CPTII,S$GLB,,

## 2023-05-22 RX ORDER — FUROSEMIDE 40 MG/1
40 TABLET ORAL DAILY PRN
Qty: 30 TABLET | Refills: 11 | Status: ON HOLD | OUTPATIENT
Start: 2023-05-22 | End: 2023-06-06 | Stop reason: HOSPADM

## 2023-05-22 NOTE — PATIENT INSTRUCTIONS
Recommend bilateral compression wraps and wound care for venous stasis blisters.    Start lasix 40mg daily as needed for worsening shortness of breath, swelling, or 3lb weight gain.

## 2023-05-22 NOTE — PROGRESS NOTES
[facility] Provider Note (Initial Clinic) Consult Note    Date of original referral: 5/1/23  Age: 78 y.o.  Gender: female  Ethnicity: White  Number of admissions for CHF within the preceding year: 1  Duration of CHF: unsure  Type of Congestive Heart Failure: Diastolic   Etiology: unspecified  Enrolled in Infusion suite: no    Diagnostic Labs:   EKG - 04/29/2023  CXR - 04/28/2023  ECHO - 04/29/2023  Stress test -   Stress echo -   Pharmacologic stress -   Cardiac catheterization -    Cardiac MRI -     Lab Results   Component Value Date     05/05/2023     05/04/2023    K 4.1 05/05/2023    K 4.2 05/04/2023     05/05/2023     05/04/2023    CO2 29 05/05/2023    CO2 25 05/04/2023     (H) 05/05/2023    GLU 82 05/04/2023    BUN 37 (H) 05/05/2023    BUN 40 (H) 05/04/2023    CREATININE 2.0 (H) 05/05/2023    CREATININE 2.3 (H) 05/04/2023    CALCIUM 8.7 05/05/2023    CALCIUM 9.1 05/04/2023    PROT 7.2 04/28/2023    PROT 7.0 03/31/2023    ALBUMIN 2.8 (L) 04/28/2023    ALBUMIN 2.7 (L) 03/31/2023    BILITOT 0.4 04/28/2023    BILITOT 0.6 03/31/2023    ALKPHOS 218 (H) 04/28/2023    ALKPHOS 203 (H) 03/31/2023    AST 17 04/28/2023    AST 20 03/31/2023    ALT 13 04/28/2023    ALT 13 03/31/2023    ANIONGAP 7 (L) 05/05/2023    ANIONGAP 9 05/04/2023    ESTGFRAFRICA 50 (A) 07/22/2022    ESTGFRAFRICA 41 (A) 07/21/2022    EGFRNONAA 43 (A) 07/22/2022    EGFRNONAA 36 (A) 07/21/2022       Lab Results   Component Value Date    WBC 3.59 (L) 05/04/2023    WBC 4.40 05/03/2023    RBC 3.85 (L) 05/04/2023    RBC 3.62 (L) 05/03/2023    HGB 9.3 (L) 05/04/2023    HGB 8.6 (L) 05/03/2023    HCT 31.7 (L) 05/04/2023    HCT 29.5 (L) 05/03/2023    MCV 82 05/04/2023    MCV 82 05/03/2023    MCH 24.2 (L) 05/04/2023    MCH 23.8 (L) 05/03/2023    MCHC 29.3 (L) 05/04/2023    MCHC 29.2 (L) 05/03/2023    RDW 16.8 (H) 05/04/2023    RDW 16.8 (H) 05/03/2023     05/04/2023     05/03/2023    MPV 10.3 05/04/2023    MPV 10.4  05/03/2023    IMMGR 0.0 05/04/2023    IMMGR 0.2 05/03/2023    IGABS 0.00 05/04/2023    IGABS 0.01 05/03/2023    LYMPH 0.6 (L) 05/04/2023    LYMPH 17.0 (L) 05/04/2023    MONO 0.4 05/04/2023    MONO 11.4 05/04/2023    EOS 0.1 05/04/2023    EOS 0.1 05/03/2023    BASO 0.01 05/04/2023    BASO 0.01 05/03/2023    NRBC 0 05/04/2023    NRBC 0 05/03/2023    GRAN 2.5 05/04/2023    GRAN 69.9 05/04/2023    EOSINOPHIL 1.4 05/04/2023    EOSINOPHIL 1.1 05/03/2023    BASOPHIL 0.3 05/04/2023    BASOPHIL 0.2 05/03/2023    PLTEST Appears normal 07/13/2022    PLTEST Appears normal 07/13/2022    ANISO Slight 07/13/2022    ANISO Slight 07/13/2022    HYPO Occasional 07/13/2022    HYPO Occasional 07/13/2022       Lab Results   Component Value Date     (H) 04/28/2023     (H) 07/12/2022    MG 1.9 04/28/2023    MG 1.9 05/18/2021    PHOS 3.3 12/22/2022    PHOS 4.2 09/09/2022    TROPONINI 0.010 04/28/2023    TROPONINI 0.016 07/12/2022    HGBA1C 8.1 (H) 03/23/2023    HGBA1C 8.4 (H) 06/30/2022    TSH 3.413 04/28/2023    TSH 2.574 03/23/2023    FREET4 1.01 12/06/2021    S5BVIPZ 7.1 11/06/2014       Lab Results   Component Value Date    IRON 44 04/16/2021    TIBC 318 04/16/2021    FERRITIN 149 04/16/2021    CHOL 114 (L) 03/23/2023    TRIG 85 03/23/2023    HDL 48 03/23/2023    LDLCALC 49.0 (L) 03/23/2023    CHOLHDL 42.1 03/23/2023    TOTALCHOLEST 2.4 03/23/2023    NONHDLCHOL 66 03/23/2023    COLORU Straw 04/28/2023    APPEARANCEUA Clear 04/28/2023    PHUR 7.0 04/28/2023    SPECGRAV 1.010 04/28/2023    PROTEINUA 3+ (A) 04/28/2023    GLUCUA 3+ (A) 04/28/2023    KETONESU Negative 04/28/2023    BILIRUBINUA Negative 04/28/2023    OCCULTUA 1+ (A) 04/28/2023    NITRITE Negative 04/28/2023    LEUKOCYTESUR Negative 04/28/2023       No implanted cardiac devices    Current Outpatient Medications on File Prior to Visit   Medication Sig Dispense Refill    amLODIPine (NORVASC) 10 MG tablet Take 1 tablet (10 mg total) by mouth once daily. 30 tablet 11     aspirin 81 MG Chew Chew 1 tablet (81 mg total) by mouth once daily. 30 tablet 5    atorvastatin (LIPITOR) 40 MG tablet Take 1 tablet (40 mg total) by mouth every evening. 90 tablet 3    blood sugar diagnostic (TRUE METRIX GLUCOSE TEST STRIP) Strp TEST BLOOD SUGAR TWICE DAILY 200 strip 3    diclofenac sodium (VOLTAREN) 1 % Gel Apply 2 g topically once daily. Apply to L Thumb as needed      insulin glargine (LANTUS U-100 INSULIN) 100 unit/mL injection Inject 15 Units into the skin every evening. 13.5 mL 3    lancets (TRUEPLUS LANCETS) 33 gauge Misc TEST TWO TIMES DAILY 200 each 3    lisinopriL (PRINIVIL,ZESTRIL) 5 MG tablet Take 1 tablet (5 mg total) by mouth once daily. 90 tablet 3    pantoprazole (PROTONIX) 40 MG tablet Take 1 tablet (40 mg total) by mouth once daily. 90 tablet 3    sodium bicarbonate 650 MG tablet Take 1 tablet (650 mg total) by mouth 2 (two) times daily. 180 tablet 3    [DISCONTINUED] blood glucose control, high (TRUE METRIX LEVEL 3) Soln Used to calibrate weekly 1 each 3     No current facility-administered medications on file prior to visit.         HPI:  Patient denies appreciable SOB with ADL since hospital discharge. Currently in SNF. Presents to clinic in wheelchair   Patient sleeps on 1 number of pillows   Patient wakes up SOB, has to get out of bed, associated cough- denies PND symptoms. Reports intermittent cough productive for white/clear sputum   Palpitations - denies    Dizzy, light-headed, pre-syncope or syncope- reports intermittent lightheadedness 2/2 vertigo. Denies pre-syncope/syncope   Since discharge frequency of performing weights, home weight and weight change- weighing daily at SNF. 266lbs today   Other information felt pertinent to HPI: Ms. Sherin Ortiz is an 79 yo female with a PMHx of CKD III, HFpEF, HTN, DM2, and HLD who presents to first [facility] visit following recent admission for ADHF and new onset AF. , CXR with pulm edema and L sided effusion consistent  with HF exacerbation. She was adequately diuresed with IVP lasix 40mg BID. CXR with possible LLL consolidation. Empirically treated for CAP. Cards consulted for new onset AF. Did not start BB given bradycardia with HR 50's.Discussed risk and benefits of OAC with patient. Does not want to be on AC given risk of fall and hx of GI bleed. May be candidate for watchman, referred to Dr. Figueroa with EP clinic on discharge for outpatient evaluation. She was adequately diuresed and discharged to SNF.     PHYSICAL:   Vitals:    05/22/23 1039   BP: (!) 150/70   Pulse: (!) 55      Wt Readings from Last 3 Encounters:   05/22/23 120.7 kg (266 lb)   04/29/23 122.9 kg (270 lb 15.1 oz)   04/13/23 122.5 kg (270 lb)     JVD: no   Heart rhythm: regular  Cardiac murmur: No    S3: no  S4: no  Lungs: clear  Hepatojugular reflux: no  Edema: yes, bilateral lymphedema with venous stasis blisters      Echo 4/29/23:  The estimated ejection fraction is 60%.  The left ventricle is normal in size with concentric remodeling and normal systolic function.  Normal left ventricular diastolic function.  Normal right ventricular size with normal right ventricular systolic function.  Severe left atrial enlargement.  The estimated PA systolic pressure is 39 mmHg.  Intermediate central venous pressure (8 mmHg).  Mild right atrial enlargement.    ASSESSMENT: Chronic diastolic HF    PLAN:      Patient Instructions:   Instruct the patient to notify this clinic if HH, a physician or an advanced care provider wants to change medication one of their HF medications   Activity and Diet restrictions:   Recommend 2-3 gram sodium restriction and 1500cc- 2000cc fluid restriction.  Encourage physical activity with graded exercise program.  Requested patient to weigh themselves daily, and to notify us if their weight increases by more than 3 lbs in 1 day or 5 lbs in 3 days.    Assigned dry weight on home scale: unsure  Medication changes (include current dose and  changed dose): NYHA Class II symptoms. Well compensated on exam. Lasix 40mg prn ordered. SNF wound care.  Upcoming labs and date anticipated: Plan for weekly calls until SNF discharge  Other diagnostic tests ordered: Heme/Onc referral for pancytopenia. Will need to establish care with EP for new onset AF.    Randi Winters PA-C

## 2023-05-23 NOTE — TELEPHONE ENCOUNTER
Should be seen. Kidneys are improving but potassium is high again, which needs to be addressed. Sodium is also slightly elevated. Would be okay to do virtual. Granddaughter has assisted with this in the past.  I had entered labs from last appt. All of it needs to be done except CBC and RFP b/c I can use results from yesterday for that.

## 2023-05-25 NOTE — PROGRESS NOTES
Sherin Ortiz was d/cd from hosp 5/5/2023 to [facility].   Referral to OPCM closed.     Ondina Nogueira RN

## 2023-05-26 ENCOUNTER — PATIENT MESSAGE (OUTPATIENT)
Dept: NEPHROLOGY | Facility: CLINIC | Age: 78
End: 2023-05-26
Payer: MEDICARE

## 2023-05-26 ENCOUNTER — HOSPITAL ENCOUNTER (INPATIENT)
Facility: HOSPITAL | Age: 78
LOS: 3 days | Discharge: NURSING, REHAB OR LONG-TERM CARE | DRG: 177 | End: 2023-05-31
Attending: EMERGENCY MEDICINE | Admitting: STUDENT IN AN ORGANIZED HEALTH CARE EDUCATION/TRAINING PROGRAM
Payer: MEDICARE

## 2023-05-26 DIAGNOSIS — R00.1 BRADYCARDIA: ICD-10-CM

## 2023-05-26 DIAGNOSIS — J90 PLEURAL EFFUSION: ICD-10-CM

## 2023-05-26 DIAGNOSIS — R07.9 CHEST PAIN: ICD-10-CM

## 2023-05-26 DIAGNOSIS — T68.XXXA HYPOTHERMIA, INITIAL ENCOUNTER: Primary | ICD-10-CM

## 2023-05-26 DIAGNOSIS — D72.819 LEUKOPENIA, UNSPECIFIED TYPE: ICD-10-CM

## 2023-05-26 DIAGNOSIS — R13.10 DYSPHAGIA: ICD-10-CM

## 2023-05-26 DIAGNOSIS — R06.00 DYSPNEA: ICD-10-CM

## 2023-05-26 LAB
ALBUMIN SERPL BCP-MCNC: 2.5 G/DL (ref 3.5–5.2)
ALP SERPL-CCNC: 197 U/L (ref 55–135)
ALT SERPL W/O P-5'-P-CCNC: 33 U/L (ref 10–44)
ANION GAP SERPL CALC-SCNC: 6 MMOL/L (ref 8–16)
ANISOCYTOSIS BLD QL SMEAR: SLIGHT
AST SERPL-CCNC: 42 U/L (ref 10–40)
BASOPHILS NFR BLD: 0 % (ref 0–1.9)
BILIRUB SERPL-MCNC: 0.4 MG/DL (ref 0.1–1)
BNP SERPL-MCNC: 142 PG/ML (ref 0–99)
BUN SERPL-MCNC: 25 MG/DL (ref 8–23)
BURR CELLS BLD QL SMEAR: ABNORMAL
CALCIUM SERPL-MCNC: 9.6 MG/DL (ref 8.7–10.5)
CHLORIDE SERPL-SCNC: 110 MMOL/L (ref 95–110)
CO2 SERPL-SCNC: 27 MMOL/L (ref 23–29)
CREAT SERPL-MCNC: 1.9 MG/DL (ref 0.5–1.4)
DIFFERENTIAL METHOD: ABNORMAL
EOSINOPHIL NFR BLD: 0 % (ref 0–8)
ERYTHROCYTE [DISTWIDTH] IN BLOOD BY AUTOMATED COUNT: 18.1 % (ref 11.5–14.5)
EST. GFR  (NO RACE VARIABLE): 26 ML/MIN/1.73 M^2
GLUCOSE SERPL-MCNC: 91 MG/DL (ref 70–110)
HCT VFR BLD AUTO: 38.1 % (ref 37–48.5)
HGB BLD-MCNC: 11.1 G/DL (ref 12–16)
IMM GRANULOCYTES # BLD AUTO: ABNORMAL K/UL (ref 0–0.04)
IMM GRANULOCYTES NFR BLD AUTO: ABNORMAL % (ref 0–0.5)
LACTATE SERPL-SCNC: 0.6 MMOL/L (ref 0.5–2.2)
LYMPHOCYTES NFR BLD: 15 % (ref 18–48)
MCH RBC QN AUTO: 24.1 PG (ref 27–31)
MCHC RBC AUTO-ENTMCNC: 29.1 G/DL (ref 32–36)
MCV RBC AUTO: 83 FL (ref 82–98)
MONOCYTES NFR BLD: 6 % (ref 4–15)
NEUTROPHILS NFR BLD: 79 % (ref 38–73)
NRBC BLD-RTO: 0 /100 WBC
PLATELET # BLD AUTO: 82 K/UL (ref 150–450)
PLATELET BLD QL SMEAR: ABNORMAL
PMV BLD AUTO: 9.5 FL (ref 9.2–12.9)
POCT GLUCOSE: 103 MG/DL (ref 70–110)
POIKILOCYTOSIS BLD QL SMEAR: SLIGHT
POLYCHROMASIA BLD QL SMEAR: ABNORMAL
POTASSIUM SERPL-SCNC: 5.3 MMOL/L (ref 3.5–5.1)
PROCALCITONIN SERPL IA-MCNC: 0.15 NG/ML
PROT SERPL-MCNC: 7 G/DL (ref 6–8.4)
RBC # BLD AUTO: 4.61 M/UL (ref 4–5.4)
SODIUM SERPL-SCNC: 143 MMOL/L (ref 136–145)
T4 FREE SERPL-MCNC: 1.08 NG/DL (ref 0.71–1.51)
TSH SERPL DL<=0.005 MIU/L-ACNC: 3.93 UIU/ML (ref 0.4–4)
WBC # BLD AUTO: 1.86 K/UL (ref 3.9–12.7)

## 2023-05-26 PROCEDURE — 96372 THER/PROPH/DIAG INJ SC/IM: CPT | Performed by: NURSE PRACTITIONER

## 2023-05-26 PROCEDURE — 99285 EMERGENCY DEPT VISIT HI MDM: CPT | Mod: 25

## 2023-05-26 PROCEDURE — 85027 COMPLETE CBC AUTOMATED: CPT | Performed by: EMERGENCY MEDICINE

## 2023-05-26 PROCEDURE — 93010 EKG 12-LEAD: ICD-10-PCS | Mod: ,,, | Performed by: INTERNAL MEDICINE

## 2023-05-26 PROCEDURE — 83880 ASSAY OF NATRIURETIC PEPTIDE: CPT | Performed by: EMERGENCY MEDICINE

## 2023-05-26 PROCEDURE — 84439 ASSAY OF FREE THYROXINE: CPT | Performed by: NURSE PRACTITIONER

## 2023-05-26 PROCEDURE — 25000003 PHARM REV CODE 250: Performed by: NURSE PRACTITIONER

## 2023-05-26 PROCEDURE — G0378 HOSPITAL OBSERVATION PER HR: HCPCS

## 2023-05-26 PROCEDURE — 82962 GLUCOSE BLOOD TEST: CPT

## 2023-05-26 PROCEDURE — 80053 COMPREHEN METABOLIC PANEL: CPT | Performed by: EMERGENCY MEDICINE

## 2023-05-26 PROCEDURE — 84480 ASSAY TRIIODOTHYRONINE (T3): CPT | Performed by: NURSE PRACTITIONER

## 2023-05-26 PROCEDURE — 83605 ASSAY OF LACTIC ACID: CPT | Performed by: NURSE PRACTITIONER

## 2023-05-26 PROCEDURE — 93005 ELECTROCARDIOGRAM TRACING: CPT

## 2023-05-26 PROCEDURE — 84443 ASSAY THYROID STIM HORMONE: CPT | Performed by: EMERGENCY MEDICINE

## 2023-05-26 PROCEDURE — 87040 BLOOD CULTURE FOR BACTERIA: CPT | Mod: 59 | Performed by: EMERGENCY MEDICINE

## 2023-05-26 PROCEDURE — 85007 BL SMEAR W/DIFF WBC COUNT: CPT | Mod: NCS | Performed by: EMERGENCY MEDICINE

## 2023-05-26 PROCEDURE — 99291 CRITICAL CARE FIRST HOUR: CPT

## 2023-05-26 PROCEDURE — 84145 PROCALCITONIN (PCT): CPT | Performed by: NURSE PRACTITIONER

## 2023-05-26 PROCEDURE — 93010 ELECTROCARDIOGRAM REPORT: CPT | Mod: ,,, | Performed by: INTERNAL MEDICINE

## 2023-05-26 RX ORDER — DEXTROSE 40 %
15 GEL (GRAM) ORAL
Status: DISCONTINUED | OUTPATIENT
Start: 2023-05-26 | End: 2023-05-31 | Stop reason: HOSPADM

## 2023-05-26 RX ORDER — PANTOPRAZOLE SODIUM 40 MG/1
40 TABLET, DELAYED RELEASE ORAL DAILY
Status: DISCONTINUED | OUTPATIENT
Start: 2023-05-27 | End: 2023-05-28

## 2023-05-26 RX ORDER — SODIUM CHLORIDE 0.9 % (FLUSH) 0.9 %
3 SYRINGE (ML) INJECTION EVERY 12 HOURS PRN
Status: DISCONTINUED | OUTPATIENT
Start: 2023-05-26 | End: 2023-05-31 | Stop reason: HOSPADM

## 2023-05-26 RX ORDER — DEXTROSE 40 %
30 GEL (GRAM) ORAL
Status: DISCONTINUED | OUTPATIENT
Start: 2023-05-26 | End: 2023-05-31 | Stop reason: HOSPADM

## 2023-05-26 RX ORDER — NALOXONE HCL 0.4 MG/ML
0.02 VIAL (ML) INJECTION
Status: DISCONTINUED | OUTPATIENT
Start: 2023-05-26 | End: 2023-05-31 | Stop reason: HOSPADM

## 2023-05-26 RX ORDER — GLUCAGON 1 MG
1 KIT INJECTION
Status: DISCONTINUED | OUTPATIENT
Start: 2023-05-26 | End: 2023-05-31 | Stop reason: HOSPADM

## 2023-05-26 RX ORDER — INSULIN ASPART 100 [IU]/ML
0-5 INJECTION, SOLUTION INTRAVENOUS; SUBCUTANEOUS
Status: DISCONTINUED | OUTPATIENT
Start: 2023-05-26 | End: 2023-05-31 | Stop reason: HOSPADM

## 2023-05-26 RX ORDER — TALC
6 POWDER (GRAM) TOPICAL NIGHTLY PRN
Status: DISCONTINUED | OUTPATIENT
Start: 2023-05-26 | End: 2023-05-31 | Stop reason: HOSPADM

## 2023-05-26 RX ORDER — ATORVASTATIN CALCIUM 40 MG/1
40 TABLET, FILM COATED ORAL NIGHTLY
Status: DISCONTINUED | OUTPATIENT
Start: 2023-05-26 | End: 2023-05-27

## 2023-05-26 RX ORDER — NAPROXEN SODIUM 220 MG/1
81 TABLET, FILM COATED ORAL DAILY
Status: DISCONTINUED | OUTPATIENT
Start: 2023-05-27 | End: 2023-05-27

## 2023-05-26 RX ORDER — ONDANSETRON 2 MG/ML
4 INJECTION INTRAMUSCULAR; INTRAVENOUS EVERY 8 HOURS PRN
Status: DISCONTINUED | OUTPATIENT
Start: 2023-05-26 | End: 2023-05-31 | Stop reason: HOSPADM

## 2023-05-26 RX ORDER — ACETAMINOPHEN 325 MG/1
650 TABLET ORAL EVERY 4 HOURS PRN
Status: DISCONTINUED | OUTPATIENT
Start: 2023-05-26 | End: 2023-05-31 | Stop reason: HOSPADM

## 2023-05-26 RX ORDER — AMLODIPINE BESYLATE 5 MG/1
10 TABLET ORAL DAILY
Status: DISCONTINUED | OUTPATIENT
Start: 2023-05-27 | End: 2023-05-27

## 2023-05-26 RX ADMIN — SODIUM ZIRCONIUM CYCLOSILICATE 10 G: 10 POWDER, FOR SUSPENSION ORAL at 09:05

## 2023-05-26 RX ADMIN — ATORVASTATIN CALCIUM 40 MG: 40 TABLET, FILM COATED ORAL at 09:05

## 2023-05-26 RX ADMIN — INSULIN DETEMIR 8 UNITS: 100 INJECTION, SOLUTION SUBCUTANEOUS at 09:05

## 2023-05-26 NOTE — ED NOTES
Placed pillow under pt head due to leaning on rail, pt continues to sleep (easily aroused), VSS, SR x 2, call light remains in reach.

## 2023-05-26 NOTE — ED PROVIDER NOTES
Encounter Date: 5/26/2023       History     Chief Complaint   Patient presents with    Sore Throat     Brought in by [facility] from [facility]. Complaint from nursing home of pt having difficulty swallowing, sensation of something in throat. Pt able to eat and swallow PO meds whole this AM.     79 yo nursing home patient with history of HTN, DM, HL brought for evaluation of difficulty swallowing for the past month. She reports that it feels like po intake is becoming stuck in her throat. No aspiration or regurgitation. No F/C/N/V/D. No family at bedside. Patient is a poor historian.     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    Benign essential hypertension 11/14/2012    Cataract     s/p phacoemulsification    Chronic kidney disease (CKD), stage III (moderate) 9/12/2013    Diabetic peripheral neuropathy associated with type 2 diabetes mellitus 11/14/2014    causing right hemiparesis    Gait disorder     Hyperlipidemia     Iritis - Both Eyes 6/10/2013    Kidney stone     Lymphedema     Morbid obesity with BMI of 40.0-44.9, adult 2/18/2015    Nephrolithiasis 4/20/2016    NS (nuclear sclerosis) 4/1/2013    Nuclear sclerosis - Both Eyes 4/29/2013    Preseptal cellulitis - Right Eye 4/29/2013    Proliferative diabetic retinopathy - Both Eyes 4/29/2013    Proliferative diabetic retinopathy, both eyes 4/1/2013    PSC (posterior subcapsular cataract) - Both Eyes 4/29/2013    S/P hernia repair 12/19/2012    TIA (transient ischemic attack) 11/18/2014    Tinea pedis  7/24/2012    Tinea pedis is present on both feet.     Type 2 diabetes mellitus with diabetic polyneuropathy, with long-term current use of insulin 9/18/2015    Type 2 diabetes mellitus with renal manifestations, controlled 12/12/2013    Type 2 diabetes, controlled, with moderate nonproliferative diabetic retinopathy without macular edema 9/17/2015    Ulcer of left lower extremity, limited to breakdown of skin 7/8/2015    Unspecified cerebral artery occlusion with cerebral infarction 11/16/2014    Unspecified venous (peripheral) insufficiency     Ureteral stone with hydronephrosis 1/27/2016    UTI (lower urinary tract infection)     Vaginal infection     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     Past Surgical History:   Procedure Laterality Date    ABLATION Bilateral 6/23/2022    Procedure: Ablation;  Surgeon: Vahid Farfan MD;  Location: [facility] CATH LAB/EP;  Service: Cardiology;  Laterality: Bilateral;    ABLATION Right 11/17/2022    Procedure: Ablation;  Surgeon: Vahid Farfan MD;  Location: [facility] CATH LAB/EP;  Service: Cardiology;  Laterality: Right;    APPENDECTOMY      CATARACT EXTRACTION W/  INTRAOCULAR LENS IMPLANT Left 5/21/2013    CATARACT EXTRACTION W/  INTRAOCULAR LENS IMPLANT Right 6/4/2013    CHOLECYSTECTOMY      COLONOSCOPY  12/22/2005    normal    COLONOSCOPY N/A 7/14/2022    Procedure: COLONOSCOPY;  Surgeon: Kannan Mace MD;  Location: [facility];  Service: Endoscopy;  Laterality: N/A;    ESOPHAGOGASTRODUODENOSCOPY  12/21/2015    hiatal hernia, Schatzki ring    ESOPHAGOGASTRODUODENOSCOPY N/A 7/13/2022    Procedure: EGD (ESOPHAGOGASTRODUODENOSCOPY);  Surgeon: Kannan Mace MD;  Location: [facility];  Service: Endoscopy;  Laterality: N/A;    EYE SURGERY Bilateral 2008    laser surgery both eyes    INTRALUMINAL GASTROINTESTINAL TRACT IMAGING VIA CAPSULE N/A 7/15/2022    Procedure: IMAGING PROCEDURE, GI TRACT, INTRALUMINAL, VIA CAPSULE;  Surgeon: Kannan Mace MD;   Location: [facility] ENDO;  Service: Endoscopy;  Laterality: N/A;    NASAL SEPTUM SURGERY      SMALL BOWEL ENTEROSCOPY N/A 2022    Procedure: ENTEROSCOPY Upper SBE;  Surgeon: Salo Frank MD;  Location: [facility] ENDO;  Service: Endoscopy;  Laterality: N/A;    SUBTOTAL COLECTOMY  2012    transverse colon, for incarcerated umbilical hernia, Dr. Kat Bower     Family History   Problem Relation Age of Onset    Diabetes Sister     Cataracts Sister     Heart disease Brother     Cataracts Brother     Leukemia Mother     Cancer Neg Hx     Amblyopia Neg Hx     Blindness Neg Hx     Glaucoma Neg Hx     Hypertension Neg Hx     Macular degeneration Neg Hx     Retinal detachment Neg Hx     Strabismus Neg Hx     Stroke Neg Hx     Thyroid disease Neg Hx     Kidney disease Neg Hx      Social History     Tobacco Use    Smoking status: Former     Packs/day: 0.50     Years: 15.00     Pack years: 7.50     Types: Cigarettes     Quit date: 1982     Years since quittin.9    Smokeless tobacco: Former    Tobacco comments:     smoked one pack per week   Substance Use Topics    Alcohol use: No    Drug use: No     Review of Systems   All other systems reviewed and are negative.    Physical Exam     Initial Vitals [23 1400]   BP Pulse Resp Temp SpO2   132/61 (!) 48 20 97 °F (36.1 °C) 100 %      MAP       --         Physical Exam    Constitutional:   obese   HENT:   Head: Normocephalic and atraumatic.   Mouth/Throat: Oropharynx is clear and moist.   Positive goiter   Eyes: EOM are normal. Pupils are equal, round, and reactive to light.   Neck:   Normal range of motion.  Cardiovascular:  Regular rhythm, normal heart sounds and intact distal pulses.           bradycardic   Pulmonary/Chest: Breath sounds normal.   Abdominal: Abdomen is soft. She exhibits no distension. There is no abdominal tenderness.   Musculoskeletal:         General: Edema present. Normal range of motion.      Cervical back:  Normal range of motion.      Comments: 3+ bilateral pretibial/pedal edema     Neurological: She is alert.   Oriented x 2   Skin: Skin is warm and dry.   Pinkish discoloration of bilateral legs (likely chronic)       ED Course   Procedures  Labs Reviewed   CBC W/ AUTO DIFFERENTIAL - Abnormal; Notable for the following components:       Result Value    WBC 1.86 (*)     Hemoglobin 11.1 (*)     MCH 24.1 (*)     MCHC 29.1 (*)     RDW 18.1 (*)     Platelets 82 (*)     Gran % 79.0 (*)     Lymph % 15.0 (*)     Platelet Estimate Decreased (*)     All other components within normal limits    Narrative:     WBC critical result(s) called and verbal readback obtained from   Daylin Velasco RN. by [facility] 05/26/2023 17:20   COMPREHENSIVE METABOLIC PANEL - Abnormal; Notable for the following components:    Potassium 5.3 (*)     BUN 25 (*)     Creatinine 1.9 (*)     Albumin 2.5 (*)     Alkaline Phosphatase 197 (*)     AST 42 (*)     Anion Gap 6 (*)     eGFR 26 (*)     All other components within normal limits   B-TYPE NATRIURETIC PEPTIDE - Abnormal; Notable for the following components:     (*)     All other components within normal limits   CULTURE, BLOOD   CULTURE, BLOOD   TSH   LACTIC ACID, PLASMA   PROCALCITONIN   TSH   URINALYSIS, REFLEX TO URINE CULTURE   POCT GLUCOSE MONITORING CONTINUOUS          Imaging Results              CT Soft Tissue Neck WO Contrast (Final result)  Result time 05/26/23 18:28:56   Procedure changed from CT Soft Tissue Neck With Contrast     Final result by Damien Rodriguez MD (05/26/23 18:28:56)                   Impression:      Enlargement of the right lobe of the thyroid gland.  Correlate for goiter.    Loculated left pleural fluid collection.  Correlate for empyema.    No neck mass or adenopathy.    Rotator cuff disease with suprascapular fluid on the right and subacromial bursal fluid bilaterally.      Electronically signed by: Damien Rodriguez  Date:    05/26/2023  Time:    18:28                Narrative:    EXAMINATION:  CT SOFT TISSUE NECK WITHOUT CONTRAST    CLINICAL HISTORY:  Lymphadenopathy, neck;Cranial neuropathy (CN 9);    TECHNIQUE:  Low dose axial images, sagittal and coronal reformations were performed from the skull base to the level of the clavicles.  Contrast was not administered.    COMPARISON:  None    FINDINGS:  There is no mass or adenopathy.  Prevertebral soft tissues appear normal.  Vascular calcifications in the aortic arch and in the common carotid artery on the right and left noted.  Floor of the mouth and base of the tongue, muscles of mastication and  space, parotid gland and parotid space, prevertebral space appear normal.  There is enlargement of the thyroid gland right greater than left with heterogeneity.  Node discrete nodule.    Small left pleural effusion which appears slightly loculated.  Otherwise the lung apices clear with no nodule or consolidation.    The skull base appears intact.  Glossopharyngeal foramen appears intact with no erosion or mass.  Cervical spondylosis is noted.  Severe rotator cuff pathology with suprascapular notch fluid on the right and subacromial bursal fluid left.                                       X-Ray Chest AP Portable (Final result)  Result time 05/26/23 17:25:59      Final result by Dewey Barfield MD (05/26/23 17:25:59)                   Impression:      1. Pulmonary findings suggest edema and pleural effusions.  No convincing large focal consolidation however developing left lower lung zone consolidation not excluded.      Electronically signed by: Dewey Barfield MD  Date:    05/26/2023  Time:    17:25               Narrative:    EXAMINATION:  XR CHEST AP PORTABLE    CLINICAL HISTORY:  Dysphagia, unspecified    TECHNIQUE:  Single frontal view of the chest was performed.    COMPARISON:  04/28/2023    FINDINGS:  The cardiomediastinal silhouette is prominent, similar to the previous exam noting calcification of the aorta..   There is obscuration of the bilateral costophrenic angles, left greater than right suggesting effusions..  The trachea is midline.  The lungs are symmetrically expanded bilaterally with coarse interstitial attenuation bilaterally suggesting edema.  Developing left lower lung zone consolidation not excluded..  There is no pneumothorax.  The osseous structures are remarkable for degenerative changes and osteopenia..                                       Medications   amLODIPine tablet 10 mg (has no administration in time range)   aspirin chewable tablet 81 mg (has no administration in time range)   atorvastatin tablet 40 mg (has no administration in time range)   insulin detemir U-100 (Levemir) pen 8 Units (has no administration in time range)   pantoprazole EC tablet 40 mg (has no administration in time range)   sodium chloride 0.9% flush 3 mL (has no administration in time range)   melatonin tablet 6 mg (has no administration in time range)   ondansetron injection 4 mg (has no administration in time range)   naloxone 0.4 mg/mL injection 0.02 mg (has no administration in time range)   acetaminophen tablet 650 mg (has no administration in time range)   insulin aspart U-100 pen 0-5 Units (has no administration in time range)   dextrose 40 % gel 15,000 mg (has no administration in time range)   dextrose 40 % gel 30,000 mg (has no administration in time range)   dextrose 10% bolus 125 mL 125 mL (has no administration in time range)   dextrose 10% bolus 250 mL 250 mL (has no administration in time range)   glucagon (human recombinant) injection 1 mg (has no administration in time range)   sodium zirconium cyclosilicate packet 10 g (has no administration in time range)     Medical Decision Making:   Initial Assessment:   79 yo nursing home patient with history of HTN, DM, HL brought for evaluation of difficulty swallowing for the past month. She reports that it feels like po intake is becoming stuck in her throat. No  aspiration or regurgitation. No F/C/N/V/D. No family at bedside. Patient is a poor historian.   Differential Diagnosis:   DDx includes but not limited to: CVA, malignancy, esophageal dysmotility, anxiety  Clinical Tests:   Lab Tests: Ordered and Reviewed  The following lab test(s) were unremarkable: CBC and CMP       <> Summary of Lab: Evidence of more pronounced leukopenia. Creatinine similar to prior. History of CKD. Left pleural effusion with questionable loculation on CT.   ED Management:  Will obtain CT soft tissue neck to rule out foreign body.     No evidence of foreign body or tumor on CT STN. Concern for hypothyroidism in light of hypothermia and bradycardia. TSH pending. No obvious source of infection at this juncture though UA pending. Discussed with [facility] who will see patient in ED and admit for further management. Mild hyperkalemia for which oral therapy initiated. Patient remains hemodynamically stable. A Bear Hugger has been applied.               Attending Attestation:         Attending Critical Care:   Critical Care Times:   ==============================================================  Total Critical Care Time - exclusive of procedural time: 35 minutes.  ==============================================================                     Clinical Impression:   Final diagnoses:  [R13.10] Dysphagia  [R06.00] Dyspnea  [T68.XXXA] Hypothermia, initial encounter (Primary)  [D72.819] Leukopenia, unspecified type  [J90] Pleural effusion  [R00.1] Bradycardia        ED Disposition Condition    Observation                 Kane De MD  05/26/23 2029

## 2023-05-26 NOTE — ED NOTES
Pt placed in high fowlers position for comfort, able to stick out tongue which is midline, uvula visualized and intact. Pt able to swallow large gulps of water w/o drooling, coughing, gagging.     MD  informed.

## 2023-05-26 NOTE — ED NOTES
Pt resting on stretcher comfortably with eyes closed. Respirations even and unlabored. VSS at this time. Will continue to monitor.

## 2023-05-26 NOTE — ED NOTES
Attempted to lay pt down due to stating feels uncomfortable, pt states sensation in throat feels worse.

## 2023-05-26 NOTE — ED NOTES
"Pt states she feels like "something is stuck in my throat." Pt is 99% RA on the monitor, no obvious signs of distress or dyspnea. Trachea is midline.  "

## 2023-05-26 NOTE — Clinical Note
Diagnosis: Hypothermia, initial encounter [911561]   Future Attending Provider: MUKUL MENDIOLA [[id number]]   Admitting Provider:: HENOK JADE [[id number]]

## 2023-05-27 PROBLEM — R00.1 BRADYCARDIA: Status: RESOLVED | Noted: 2023-05-26 | Resolved: 2023-05-27

## 2023-05-27 PROBLEM — D72.819 LEUKOPENIA: Status: RESOLVED | Noted: 2023-05-26 | Resolved: 2023-05-27

## 2023-05-27 PROBLEM — N18.30 ACUTE RENAL FAILURE SUPERIMPOSED ON STAGE 3 CHRONIC KIDNEY DISEASE: Status: RESOLVED | Noted: 2019-03-22 | Resolved: 2023-05-27

## 2023-05-27 PROBLEM — D61.818 PANCYTOPENIA: Status: ACTIVE | Noted: 2023-05-26

## 2023-05-27 PROBLEM — N17.9 ACUTE RENAL FAILURE SUPERIMPOSED ON STAGE 3 CHRONIC KIDNEY DISEASE: Status: RESOLVED | Noted: 2019-03-22 | Resolved: 2023-05-27

## 2023-05-27 LAB
ALBUMIN SERPL BCP-MCNC: 2.2 G/DL (ref 3.5–5.2)
ALP SERPL-CCNC: 168 U/L (ref 55–135)
ALT SERPL W/O P-5'-P-CCNC: 31 U/L (ref 10–44)
ANION GAP SERPL CALC-SCNC: 10 MMOL/L (ref 8–16)
ANION GAP SERPL CALC-SCNC: 4 MMOL/L (ref 8–16)
ANION GAP SERPL CALC-SCNC: 7 MMOL/L (ref 8–16)
ANION GAP SERPL CALC-SCNC: 7 MMOL/L (ref 8–16)
ANISOCYTOSIS BLD QL SMEAR: SLIGHT
AST SERPL-CCNC: 42 U/L (ref 10–40)
BACTERIA #/AREA URNS HPF: ABNORMAL /HPF
BASOPHILS # BLD AUTO: ABNORMAL K/UL (ref 0–0.2)
BASOPHILS NFR BLD: 0 % (ref 0–1.9)
BILIRUB SERPL-MCNC: 0.3 MG/DL (ref 0.1–1)
BILIRUB UR QL STRIP: NEGATIVE
BILIRUB UR QL STRIP: NEGATIVE
BUN SERPL-MCNC: 25 MG/DL (ref 8–23)
BUN SERPL-MCNC: 26 MG/DL (ref 8–23)
BUN SERPL-MCNC: 27 MG/DL (ref 8–23)
BUN SERPL-MCNC: 28 MG/DL (ref 8–23)
BUN SERPL-MCNC: 29 MG/DL (ref 8–23)
CALCIUM SERPL-MCNC: 8.8 MG/DL (ref 8.7–10.5)
CALCIUM SERPL-MCNC: 8.9 MG/DL (ref 8.7–10.5)
CALCIUM SERPL-MCNC: 9 MG/DL (ref 8.7–10.5)
CALCIUM SERPL-MCNC: 9.1 MG/DL (ref 8.7–10.5)
CALCIUM SERPL-MCNC: 9.1 MG/DL (ref 8.7–10.5)
CALCIUM SERPL-MCNC: 9.2 MG/DL (ref 8.7–10.5)
CALCIUM SERPL-MCNC: 9.3 MG/DL (ref 8.7–10.5)
CHLORIDE SERPL-SCNC: 108 MMOL/L (ref 95–110)
CHLORIDE SERPL-SCNC: 110 MMOL/L (ref 95–110)
CHLORIDE SERPL-SCNC: 110 MMOL/L (ref 95–110)
CHLORIDE SERPL-SCNC: 111 MMOL/L (ref 95–110)
CK SERPL-CCNC: 112 U/L (ref 20–180)
CLARITY UR: ABNORMAL
CLARITY UR: ABNORMAL
CO2 SERPL-SCNC: 23 MMOL/L (ref 23–29)
CO2 SERPL-SCNC: 24 MMOL/L (ref 23–29)
CO2 SERPL-SCNC: 27 MMOL/L (ref 23–29)
CO2 SERPL-SCNC: 28 MMOL/L (ref 23–29)
CO2 SERPL-SCNC: 29 MMOL/L (ref 23–29)
COLOR UR: YELLOW
COLOR UR: YELLOW
CREAT SERPL-MCNC: 1.9 MG/DL (ref 0.5–1.4)
CREAT SERPL-MCNC: 2 MG/DL (ref 0.5–1.4)
CREAT SERPL-MCNC: 2.3 MG/DL (ref 0.5–1.4)
CREAT SERPL-MCNC: 2.5 MG/DL (ref 0.5–1.4)
CREAT SERPL-MCNC: 2.7 MG/DL (ref 0.5–1.4)
DIFFERENTIAL METHOD: ABNORMAL
EOSINOPHIL # BLD AUTO: ABNORMAL K/UL (ref 0–0.5)
EOSINOPHIL NFR BLD: 0 % (ref 0–8)
ERYTHROCYTE [DISTWIDTH] IN BLOOD BY AUTOMATED COUNT: 17.7 % (ref 11.5–14.5)
EST. GFR  (NO RACE VARIABLE): 17 ML/MIN/1.73 M^2
EST. GFR  (NO RACE VARIABLE): 19 ML/MIN/1.73 M^2
EST. GFR  (NO RACE VARIABLE): 21 ML/MIN/1.73 M^2
EST. GFR  (NO RACE VARIABLE): 25 ML/MIN/1.73 M^2
EST. GFR  (NO RACE VARIABLE): 26 ML/MIN/1.73 M^2
GLUCOSE SERPL-MCNC: 165 MG/DL (ref 70–110)
GLUCOSE SERPL-MCNC: 181 MG/DL (ref 70–110)
GLUCOSE SERPL-MCNC: 48 MG/DL (ref 70–110)
GLUCOSE SERPL-MCNC: 71 MG/DL (ref 70–110)
GLUCOSE SERPL-MCNC: 72 MG/DL (ref 70–110)
GLUCOSE SERPL-MCNC: 72 MG/DL (ref 70–110)
GLUCOSE SERPL-MCNC: 99 MG/DL (ref 70–110)
GLUCOSE UR QL STRIP: ABNORMAL
GLUCOSE UR QL STRIP: ABNORMAL
HCT VFR BLD AUTO: 32 % (ref 37–48.5)
HGB BLD-MCNC: 9.7 G/DL (ref 12–16)
HGB UR QL STRIP: NEGATIVE
HGB UR QL STRIP: NEGATIVE
HYALINE CASTS #/AREA URNS LPF: 8 /LPF
HYPOCHROMIA BLD QL SMEAR: ABNORMAL
IMM GRANULOCYTES # BLD AUTO: ABNORMAL K/UL (ref 0–0.04)
IMM GRANULOCYTES NFR BLD AUTO: ABNORMAL % (ref 0–0.5)
KETONES UR QL STRIP: NEGATIVE
KETONES UR QL STRIP: NEGATIVE
LEUKOCYTE ESTERASE UR QL STRIP: NEGATIVE
LEUKOCYTE ESTERASE UR QL STRIP: NEGATIVE
LYMPHOCYTES # BLD AUTO: ABNORMAL K/UL (ref 1–4.8)
LYMPHOCYTES NFR BLD: 21 % (ref 18–48)
MAGNESIUM SERPL-MCNC: 1.9 MG/DL (ref 1.6–2.6)
MCH RBC QN AUTO: 24.6 PG (ref 27–31)
MCHC RBC AUTO-ENTMCNC: 30.3 G/DL (ref 32–36)
MCV RBC AUTO: 81 FL (ref 82–98)
MICROSCOPIC COMMENT: ABNORMAL
MONOCYTES # BLD AUTO: ABNORMAL K/UL (ref 0.3–1)
MONOCYTES NFR BLD: 2 % (ref 4–15)
NEUTROPHILS NFR BLD: 77 % (ref 38–73)
NITRITE UR QL STRIP: NEGATIVE
NITRITE UR QL STRIP: NEGATIVE
NON-SQ EPI CELLS #/AREA URNS HPF: 4 /HPF
NRBC BLD-RTO: 0 /100 WBC
OVALOCYTES BLD QL SMEAR: ABNORMAL
PATH REV BLD -IMP: NORMAL
PH UR STRIP: 7 [PH] (ref 5–8)
PH UR STRIP: 7 [PH] (ref 5–8)
PHOSPHATE SERPL-MCNC: 4.1 MG/DL (ref 2.7–4.5)
PLATELET # BLD AUTO: 89 K/UL (ref 150–450)
PLATELET BLD QL SMEAR: ABNORMAL
PMV BLD AUTO: 10.4 FL (ref 9.2–12.9)
POCT GLUCOSE: 270 MG/DL (ref 70–110)
POCT GLUCOSE: 73 MG/DL (ref 70–110)
POCT GLUCOSE: 76 MG/DL (ref 70–110)
POCT GLUCOSE: 82 MG/DL (ref 70–110)
POCT GLUCOSE: 99 MG/DL (ref 70–110)
POIKILOCYTOSIS BLD QL SMEAR: SLIGHT
POTASSIUM SERPL-SCNC: 5.2 MMOL/L (ref 3.5–5.1)
POTASSIUM SERPL-SCNC: 5.2 MMOL/L (ref 3.5–5.1)
POTASSIUM SERPL-SCNC: 5.5 MMOL/L (ref 3.5–5.1)
POTASSIUM SERPL-SCNC: 5.7 MMOL/L (ref 3.5–5.1)
POTASSIUM SERPL-SCNC: 5.7 MMOL/L (ref 3.5–5.1)
POTASSIUM SERPL-SCNC: 5.9 MMOL/L (ref 3.5–5.1)
POTASSIUM SERPL-SCNC: 6.4 MMOL/L (ref 3.5–5.1)
PROT SERPL-MCNC: 6.2 G/DL (ref 6–8.4)
PROT UR QL STRIP: ABNORMAL
PROT UR QL STRIP: ABNORMAL
RBC # BLD AUTO: 3.95 M/UL (ref 4–5.4)
RBC #/AREA URNS HPF: 6 /HPF (ref 0–4)
SODIUM SERPL-SCNC: 141 MMOL/L (ref 136–145)
SODIUM SERPL-SCNC: 142 MMOL/L (ref 136–145)
SODIUM SERPL-SCNC: 143 MMOL/L (ref 136–145)
SODIUM SERPL-SCNC: 143 MMOL/L (ref 136–145)
SODIUM SERPL-SCNC: 144 MMOL/L (ref 136–145)
SP GR UR STRIP: 1.01 (ref 1–1.03)
SP GR UR STRIP: 1.01 (ref 1–1.03)
SQUAMOUS #/AREA URNS HPF: 13 /HPF
T3 SERPL-MCNC: 56 NG/DL (ref 60–180)
URN SPEC COLLECT METH UR: ABNORMAL
URN SPEC COLLECT METH UR: ABNORMAL
UROBILINOGEN UR STRIP-ACNC: NEGATIVE EU/DL
UROBILINOGEN UR STRIP-ACNC: NEGATIVE EU/DL
WBC # BLD AUTO: 1.81 K/UL (ref 3.9–12.7)
WBC #/AREA URNS HPF: 3 /HPF (ref 0–5)

## 2023-05-27 PROCEDURE — 84100 ASSAY OF PHOSPHORUS: CPT | Performed by: NURSE PRACTITIONER

## 2023-05-27 PROCEDURE — 25000003 PHARM REV CODE 250: Performed by: STUDENT IN AN ORGANIZED HEALTH CARE EDUCATION/TRAINING PROGRAM

## 2023-05-27 PROCEDURE — 99223 1ST HOSP IP/OBS HIGH 75: CPT | Mod: ,,, | Performed by: HOSPITALIST

## 2023-05-27 PROCEDURE — 81000 URINALYSIS NONAUTO W/SCOPE: CPT | Performed by: EMERGENCY MEDICINE

## 2023-05-27 PROCEDURE — 51798 US URINE CAPACITY MEASURE: CPT

## 2023-05-27 PROCEDURE — 82550 ASSAY OF CK (CPK): CPT | Performed by: NURSE PRACTITIONER

## 2023-05-27 PROCEDURE — 36415 COLL VENOUS BLD VENIPUNCTURE: CPT | Performed by: STUDENT IN AN ORGANIZED HEALTH CARE EDUCATION/TRAINING PROGRAM

## 2023-05-27 PROCEDURE — 96365 THER/PROPH/DIAG IV INF INIT: CPT | Mod: 59

## 2023-05-27 PROCEDURE — 85060 PATHOLOGIST REVIEW: ICD-10-PCS | Mod: ,,, | Performed by: PATHOLOGY

## 2023-05-27 PROCEDURE — 85060 BLOOD SMEAR INTERPRETATION: CPT | Mod: ,,, | Performed by: PATHOLOGY

## 2023-05-27 PROCEDURE — 96366 THER/PROPH/DIAG IV INF ADDON: CPT

## 2023-05-27 PROCEDURE — 31575 DIAGNOSTIC LARYNGOSCOPY: CPT

## 2023-05-27 PROCEDURE — 92610 EVALUATE SWALLOWING FUNCTION: CPT

## 2023-05-27 PROCEDURE — 31575 PR LARYNGOSCOPY, FLEXIBLE; DIAGNOSTIC: ICD-10-PCS | Mod: ,,, | Performed by: OTOLARYNGOLOGY

## 2023-05-27 PROCEDURE — 85025 COMPLETE CBC W/AUTO DIFF WBC: CPT | Performed by: NURSE PRACTITIONER

## 2023-05-27 PROCEDURE — 80048 BASIC METABOLIC PNL TOTAL CA: CPT | Mod: 91,XB | Performed by: NURSE PRACTITIONER

## 2023-05-27 PROCEDURE — 99223 PR INITIAL HOSPITAL CARE,LEVL III: ICD-10-PCS | Mod: ,,, | Performed by: HOSPITALIST

## 2023-05-27 PROCEDURE — 96375 TX/PRO/DX INJ NEW DRUG ADDON: CPT

## 2023-05-27 PROCEDURE — 83735 ASSAY OF MAGNESIUM: CPT | Performed by: NURSE PRACTITIONER

## 2023-05-27 PROCEDURE — 99223 PR INITIAL HOSPITAL CARE,LEVL III: ICD-10-PCS | Mod: 25,,, | Performed by: OTOLARYNGOLOGY

## 2023-05-27 PROCEDURE — 36415 COLL VENOUS BLD VENIPUNCTURE: CPT | Performed by: NURSE PRACTITIONER

## 2023-05-27 PROCEDURE — S0030 INJECTION, METRONIDAZOLE: HCPCS | Performed by: STUDENT IN AN ORGANIZED HEALTH CARE EDUCATION/TRAINING PROGRAM

## 2023-05-27 PROCEDURE — 99223 1ST HOSP IP/OBS HIGH 75: CPT | Mod: 25,,, | Performed by: OTOLARYNGOLOGY

## 2023-05-27 PROCEDURE — G0378 HOSPITAL OBSERVATION PER HR: HCPCS

## 2023-05-27 PROCEDURE — 96367 TX/PROPH/DG ADDL SEQ IV INF: CPT

## 2023-05-27 PROCEDURE — 80053 COMPREHEN METABOLIC PANEL: CPT | Performed by: NURSE PRACTITIONER

## 2023-05-27 PROCEDURE — 31575 DIAGNOSTIC LARYNGOSCOPY: CPT | Mod: ,,, | Performed by: OTOLARYNGOLOGY

## 2023-05-27 PROCEDURE — 63600175 PHARM REV CODE 636 W HCPCS: Performed by: STUDENT IN AN ORGANIZED HEALTH CARE EDUCATION/TRAINING PROGRAM

## 2023-05-27 RX ORDER — FUROSEMIDE 10 MG/ML
40 INJECTION INTRAMUSCULAR; INTRAVENOUS ONCE
Status: COMPLETED | OUTPATIENT
Start: 2023-05-27 | End: 2023-05-27

## 2023-05-27 RX ORDER — MORPHINE SULFATE ORAL SOLUTION 10 MG/5ML
5 SOLUTION ORAL EVERY 6 HOURS PRN
Status: DISCONTINUED | OUTPATIENT
Start: 2023-05-27 | End: 2023-05-27

## 2023-05-27 RX ORDER — CALCIUM GLUCONATE 20 MG/ML
1 INJECTION, SOLUTION INTRAVENOUS ONCE
Status: COMPLETED | OUTPATIENT
Start: 2023-05-27 | End: 2023-05-27

## 2023-05-27 RX ORDER — LACTULOSE 10 G/15ML
20 SOLUTION ORAL 3 TIMES DAILY
Status: DISCONTINUED | OUTPATIENT
Start: 2023-05-27 | End: 2023-05-28

## 2023-05-27 RX ORDER — METRONIDAZOLE 500 MG/100ML
500 INJECTION, SOLUTION INTRAVENOUS
Status: DISCONTINUED | OUTPATIENT
Start: 2023-05-27 | End: 2023-05-31 | Stop reason: HOSPADM

## 2023-05-27 RX ORDER — DEXTROSE MONOHYDRATE AND SODIUM CHLORIDE 5; .45 G/100ML; G/100ML
INJECTION, SOLUTION INTRAVENOUS CONTINUOUS
Status: DISCONTINUED | OUTPATIENT
Start: 2023-05-27 | End: 2023-05-27

## 2023-05-27 RX ORDER — MORPHINE SULFATE ORAL SOLUTION 10 MG/5ML
5 SOLUTION ORAL EVERY 4 HOURS PRN
Status: DISCONTINUED | OUTPATIENT
Start: 2023-05-27 | End: 2023-05-30

## 2023-05-27 RX ADMIN — SODIUM BICARBONATE: 84 INJECTION, SOLUTION INTRAVENOUS at 01:05

## 2023-05-27 RX ADMIN — FUROSEMIDE 40 MG: 10 INJECTION, SOLUTION INTRAMUSCULAR; INTRAVENOUS at 05:05

## 2023-05-27 RX ADMIN — MORPHINE SULFATE 5 MG: 10 SOLUTION ORAL at 04:05

## 2023-05-27 RX ADMIN — INSULIN HUMAN 10 UNITS: 100 INJECTION, SOLUTION PARENTERAL at 04:05

## 2023-05-27 RX ADMIN — METRONIDAZOLE 500 MG: 500 INJECTION, SOLUTION INTRAVENOUS at 05:05

## 2023-05-27 RX ADMIN — DEXTROSE MONOHYDRATE 500 ML: 100 INJECTION, SOLUTION INTRAVENOUS at 04:05

## 2023-05-27 RX ADMIN — VANCOMYCIN HYDROCHLORIDE 2000 MG: 500 INJECTION, POWDER, LYOPHILIZED, FOR SOLUTION INTRAVENOUS at 06:05

## 2023-05-27 RX ADMIN — SODIUM ZIRCONIUM CYCLOSILICATE 10 G: 5 POWDER, FOR SUSPENSION ORAL at 09:05

## 2023-05-27 RX ADMIN — SODIUM ZIRCONIUM CYCLOSILICATE 5 G: 5 POWDER, FOR SUSPENSION ORAL at 10:05

## 2023-05-27 RX ADMIN — CALCIUM GLUCONATE 1 G: 20 INJECTION, SOLUTION INTRAVENOUS at 05:05

## 2023-05-27 RX ADMIN — LACTULOSE 20 G: 20 SOLUTION ORAL at 04:05

## 2023-05-27 RX ADMIN — MORPHINE SULFATE 5 MG: 10 SOLUTION ORAL at 09:05

## 2023-05-27 RX ADMIN — CEFTRIAXONE SODIUM 2 G: 2 INJECTION, POWDER, FOR SOLUTION INTRAMUSCULAR; INTRAVENOUS at 05:05

## 2023-05-27 RX ADMIN — SODIUM ZIRCONIUM CYCLOSILICATE 10 G: 5 POWDER, FOR SUSPENSION ORAL at 03:05

## 2023-05-27 RX ADMIN — LACTULOSE 20 G: 20 SOLUTION ORAL at 09:05

## 2023-05-27 NOTE — ASSESSMENT & PLAN NOTE
·  WBC 1.86   · Possible etiology infectious vs malignant  · Lactate and procalcitonin normal  · Blood culture in process  · UA pending collection  · Hem/Onc consult ordered

## 2023-05-27 NOTE — PLAN OF CARE
05/27/23 0034   Admission   Initial VN Admission Questions Complete   Communication Issues? None   Shift   Virtual Nurse - Rounding Complete   Pain Management Interventions other (see comments)  (warming blanket present)   Virtual Nurse - Patient Verbalized Approval Of Camera Use;VN Rounding   Type of Frequent Check   Type Patient Rounds   Safety/Activity   Patient Rounds bed in low position;bed wheels locked;call light in patient/parent reach;clutter free environment maintained;ID band on;visualized patient;placement of personal items at bedside   Safety Promotion/Fall Prevention assistive device/personal item within reach;bed alarm set;Fall Risk reviewed with patient/family;medications reviewed;side rails raised x 2   Positioning   Body Position supine   Head of Bed (HOB) Positioning HOB elevated   Positioning/Transfer Devices pillows;in use   Pain/Comfort/Sleep   Preferred Pain Scale number (Numeric Rating Pain Scale)   Comfort/Acceptable Pain Level 3   Pain Rating (0-10): Rest 0     Admission questions completed. Medication reconciliation completed via nursing home paperwork. Introduced patient to VIP model, patient verbalized understanding. Educated patient on fall prevention protocol, updated on plan of care. Opportunity given for pt's questions. All questions answered. Denies needs at this time.

## 2023-05-27 NOTE — ASSESSMENT & PLAN NOTE
Patient's FSGs are controlled on current medication regimen.  Last A1c reviewed-   Lab Results   Component Value Date    HGBA1C 8.1 (H) 03/23/2023     Most recent fingerstick glucose reviewed-   Recent Labs   Lab 05/26/23  2110 05/27/23  0436 05/27/23  0941 05/27/23  1102   POCTGLUCOSE 103 76 82 73     Current correctional scale  Low  Maintain anti-hyperglycemic dose as follows-   Antihyperglycemics (From admission, onward)    Start     Stop Route Frequency Ordered    05/26/23 2055  insulin aspart U-100 pen 0-5 Units         -- SubQ Before meals & nightly PRN 05/26/23 2008        Hold Oral hypoglycemics while patient is in the hospital.

## 2023-05-27 NOTE — SUBJECTIVE & OBJECTIVE
Interval History:  Hypoglycemic, pancytopenic, hypothermic large left loculated pleural effusion concerning for underlying infection.  ENT evaluation was relatively unrevealing.  Pulmonology was consulted for thoracentesis.    Review of Systems   Unable to perform ROS: Mental status change   Objective:     Vital Signs (Most Recent):  Temp: 99.1 °F (37.3 °C) (05/27/23 1103)  Pulse: 97 (05/27/23 1145)  Resp: 17 (05/27/23 1103)  BP: (!) 108/52 (05/27/23 1103)  SpO2: (!) 92 % (05/27/23 1103) Vital Signs (24h Range):  Temp:  [89 °F (31.7 °C)-99.1 °F (37.3 °C)] 99.1 °F (37.3 °C)  Pulse:  [47-99] 97  Resp:  [15-20] 17  SpO2:  [92 %-100 %] 92 %  BP: (105-148)/(52-70) 108/52     Weight: 119.1 kg (262 lb 9.1 oz)  Body mass index is 41.12 kg/m².    Intake/Output Summary (Last 24 hours) at 5/27/2023 1208  Last data filed at 5/27/2023 0438  Gross per 24 hour   Intake --   Output 400 ml   Net -400 ml         Physical Exam  Vitals reviewed.   Constitutional:       General: She is not in acute distress.     Appearance: She is obese. She is ill-appearing.   HENT:      Head: Normocephalic.   Eyes:      General:         Right eye: No discharge.         Left eye: No discharge.   Neck:      Comments: Trachea midline.  Cardiovascular:      Rate and Rhythm: Normal rate and regular rhythm.   Pulmonary:      Breath sounds: No wheezing or rales.      Comments: Decreased breath sounds of the left lower and left mid lung fields  Abdominal:      General: There is no distension.      Tenderness: There is no abdominal tenderness.   Musculoskeletal:      Cervical back: Rigidity present.      Right lower leg: Edema present.      Left lower leg: Edema present.   Skin:     General: Skin is warm.      Capillary Refill: Capillary refill takes less than 2 seconds.      Comments: Chronic skin changes of bilateral lower extremities.   Neurological:      Comments: Localizes.  Drowsy, but opens eyes to stimuli.  Intermittently follows commands.  Moves all 4  extremities.           Significant Labs: All pertinent labs within the past 24 hours have been reviewed.  CBC:   Recent Labs   Lab 05/26/23  1656 05/27/23  0440   WBC 1.86* 1.81*   HGB 11.1* 9.7*   HCT 38.1 32.0*   PLT 82* 89*     CMP:   Recent Labs   Lab 05/26/23  1656 05/27/23  0038 05/27/23  0440 05/27/23  0801    143 144  144 144   K 5.3* 5.5* 5.7*  5.7* 5.9*    111* 111*  111* 111*   CO2 27 28 29  29 29   GLU 91 99 72  71 48*   BUN 25* 27* 25*  26* 28*   CREATININE 1.9* 1.9* 2.0*  2.0* 2.0*   CALCIUM 9.6 9.3 9.1  9.1 9.0   PROT 7.0  --  6.2  --    ALBUMIN 2.5*  --  2.2*  --    BILITOT 0.4  --  0.3  --    ALKPHOS 197*  --  168*  --    AST 42*  --  42*  --    ALT 33  --  31  --    ANIONGAP 6* 4* 4*  4* 4*       Significant Imaging: I have reviewed all pertinent imaging results/findings within the past 24 hours.

## 2023-05-27 NOTE — NURSING
"RAPID RESPONSE NURSE PROACTIVE ROUNDING NOTE     Time of Visit: 0440    Admit Date: 2023  LOS: 0  Code Status: DNR   Date of Visit: 2023  : 1945  Age: 78 y.o.  Sex: female  Race: White  Bed: K460/K460 A:   MRN: [id number]  Was the patient discharged from an ICU this admission? No   Was the patient discharged from a PACU within last 24 hours?  No  Did the patient receive conscious sedation/general anesthesia in last 24 hours?  No  Was the patient in the ED within the past 24 hours?  Yes  Was the patient started on NIPPV within the past 24 hours?  No  Attending Physician: Derrick Viera, *  Primary Service: Networked reference to record PCT     ASSESSMENT     Notified by charge RN during rounding.  Reason for alert: hypothermia    Diagnosis: Dysphagia    Abnormal Vital Signs: /62 (BP Location: Right arm, Patient Position: Lying)   Pulse 69   Temp (!) 90.5 °F (32.5 °C) (Rectal)   Resp 20   Ht 5' 7" (1.702 m)   Wt 119.1 kg (262 lb 9.1 oz)   LMP  (LMP Unknown) Comment: patient refused to weigh/wounds to legs and toes   SpO2 96%   BMI 41.12 kg/m²      Clinical Issues:  dysphagia    Patient  has a past medical history of Allergy, Asteroid hyalosis - Left Eye, Benign essential hypertension, Cataract, Chronic kidney disease (CKD), stage III (moderate), Diabetic peripheral neuropathy associated with type 2 diabetes mellitus, Gait disorder, Hyperlipidemia, Iritis - Both Eyes, Kidney stone, Lymphedema, Morbid obesity with BMI of 40.0-44.9, adult, Nephrolithiasis, NS (nuclear sclerosis), Nuclear sclerosis - Both Eyes, Preseptal cellulitis - Right Eye, Proliferative diabetic retinopathy - Both Eyes, Proliferative diabetic retinopathy, both eyes, PSC (posterior subcapsular cataract) - Both Eyes, S/P hernia repair, TIA (transient ischemic attack), Tinea pedis, Type 2 diabetes mellitus with diabetic polyneuropathy, with long-term current use of insulin, Type 2 diabetes mellitus with renal " manifestations, controlled, Type 2 diabetes, controlled, with moderate nonproliferative diabetic retinopathy without macular edema, Ulcer of left lower extremity, limited to breakdown of skin, Unspecified cerebral artery occlusion with cerebral infarction, Unspecified venous (peripheral) insufficiency, Ureteral stone with hydronephrosis, UTI (lower urinary tract infection), Vaginal infection, and Vertical heterotropia - Both Eyes.      Chart reviewed after request from tele charge RN. Pt hypothermic, bairhugger in place as well as multiple blankets. Last rectal temp 90.5. Labs reviewed. Midnight labs showing K+ 5.5, MD notified. 0400 labs due to result soon, will wait for results per MD. Team will continue to follow.     0600: AM labs - K+ 5.7. MD notified. Order placed for Lokelma.       FOLLOW-UP     Call back the Rapid Response Nurse, JL CHINCHILLA RN at [facility] Phone: [phone] for additional questions or concerns.

## 2023-05-27 NOTE — CONSULTS
Pulmonary Consult Note    Inpatient consult to Pulmonology  Consult performed by: Sergey Hansen MD  Consult ordered by: Sherin Mckeon NP  Reason for consult: Left pleural effusion  Assessment/Recommendations:     1. Left sided pleural effusion  - unknown etiology, possible infectious vs. Malignant vs. Cardiogenic, but most likely cardiogenic based on findings below  - bedside POCUS with simple appearing fluid; no loculations, septations, debris; pocket is relatively small, measuring only around 1-2cm in depth prior to visceral pleura  - TTE POCUS with LV concentric hypertrophy, and review of recent formal TTE note significant LA enlargement  - IVC dilated and not collapsing with respiratory variation  - BNP elevated in setting of morbid obesity  - given simple appearance of effusion, morbid obesity, difficulty with positioning, lack of patient mobility and overall difficulty with performing thoracentesis, as well as likely cardiogenic origin of fluid, risk of procedure likely outweighs any benefit  - additionally, per family at bedside, patient previously expressed wishes for less aggressive medical care and overall seeming to want a palliative approach to her medical therapy; this was discussed with primary physician Dr. Harris  - recommend simple diuresis for now with no plans for thoracentesis    2. Renal failure with worsening hyperkalemia  - Dr. Harris to discuss goals of care with patient family  - not responding to simple medical therapy, does not appear dialysis would be in line with patient's previously described wishes    3. Pancytopenia  - management as per primary and hematology    Patient seen and discussed with Dr. Parr. Pulmonary to sign off but will be available for any further questions or concerns. If patient is to be stepped up to ICU for renal failure, we will follow along.    Sergey Hansen MD  Pulmonary / Critical Care      SUBJECTIVE:     History of Present Illness:  Patient is a 78  "y.o. female presents with HTN, DM2, HLD, obesity, HFpEF, CKDIV presents from NH for dysphagia. Pulmonary consulted for left sided pleural effusion.    Evaluated at bedside. Patient resting in bed comfortably without distress. Eyes are closed. Daughter and god-daughter at bedside and provide history as patient is not interactive. Report symptoms of dysphagia as described in HPI. Has had evaluation with ENT and speech since arrival to [place] with no specific abnormalities seen. State patient's health has been declining rapidly over the past few months with frequent hospitalizations and NH/inpatient rehab stays. Lives alone but is very immobile. Mobility limited to electric scooter. Family lives near patient and checks in on her daily. Patient has previously expressed that she is tired of being in the hospital and no longer wishes to have invasive medical procedures performed. She expressed that she just wants to "die at home".    Bedside POCUS of left pleural effusion completed with significant difficulty due to patient inability to cooperative or move herself into appropriate position. Findings as described above in A/P. Additional performed US of heart as described above. Finally performed bilateral LE DVT US with fully compressible vessels at bilateral femoral and popliteal veins.    Discussed findings and family discussion with Dr. Harris outside of patient's room.    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    Benign essential hypertension 11/14/2012    Cataract     s/p phacoemulsification "    Chronic kidney disease (CKD), stage III (moderate) 9/12/2013    Diabetic peripheral neuropathy associated with type 2 diabetes mellitus 11/14/2014    causing right hemiparesis    Gait disorder     Hyperlipidemia     Iritis - Both Eyes 6/10/2013    Kidney stone     Lymphedema     Morbid obesity with BMI of 40.0-44.9, adult 2/18/2015    Nephrolithiasis 4/20/2016    NS (nuclear sclerosis) 4/1/2013    Nuclear sclerosis - Both Eyes 4/29/2013    Preseptal cellulitis - Right Eye 4/29/2013    Proliferative diabetic retinopathy - Both Eyes 4/29/2013    Proliferative diabetic retinopathy, both eyes 4/1/2013    PSC (posterior subcapsular cataract) - Both Eyes 4/29/2013    S/P hernia repair 12/19/2012    TIA (transient ischemic attack) 11/18/2014    Tinea pedis 7/24/2012    Tinea pedis is present on both feet.     Type 2 diabetes mellitus with diabetic polyneuropathy, with long-term current use of insulin 9/18/2015    Type 2 diabetes mellitus with renal manifestations, controlled 12/12/2013    Type 2 diabetes, controlled, with moderate nonproliferative diabetic retinopathy without macular edema 9/17/2015    Ulcer of left lower extremity, limited to breakdown of skin 7/8/2015    Unspecified cerebral artery occlusion with cerebral infarction 11/16/2014    Unspecified venous (peripheral) insufficiency     Ureteral stone with hydronephrosis 1/27/2016    UTI (lower urinary tract infection)     Vaginal infection     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     Past Surgical History:   Procedure Laterality Date    ABLATION Bilateral 6/23/2022    Procedure: Ablation;  Surgeon: Vahid Farfan MD;  Location: [facility] CATH LAB/EP;  Service: Cardiology;  Laterality: Bilateral;    ABLATION Right 11/17/2022    Procedure: Ablation;  Surgeon: Vahid Farfan MD;  Location: [facility] CATH LAB/EP;  Service: Cardiology;  Laterality: Right;    APPENDECTOMY      CATARACT EXTRACTION W/  INTRAOCULAR LENS IMPLANT Left 5/21/2013    CATARACT EXTRACTION W/   INTRAOCULAR LENS IMPLANT Right 2013    CHOLECYSTECTOMY      COLONOSCOPY  2005    normal    COLONOSCOPY N/A 2022    Procedure: COLONOSCOPY;  Surgeon: Kannan Mace MD;  Location: [facility];  Service: Endoscopy;  Laterality: N/A;    ESOPHAGOGASTRODUODENOSCOPY  2015    hiatal hernia, Schatzki ring    ESOPHAGOGASTRODUODENOSCOPY N/A 2022    Procedure: EGD (ESOPHAGOGASTRODUODENOSCOPY);  Surgeon: Kannan Mace MD;  Location: [facility];  Service: Endoscopy;  Laterality: N/A;    EYE SURGERY Bilateral     laser surgery both eyes    INTRALUMINAL GASTROINTESTINAL TRACT IMAGING VIA CAPSULE N/A 7/15/2022    Procedure: IMAGING PROCEDURE, GI TRACT, INTRALUMINAL, VIA CAPSULE;  Surgeon: Kannan Mace MD;  Location: [facility];  Service: Endoscopy;  Laterality: N/A;    NASAL SEPTUM SURGERY      SMALL BOWEL ENTEROSCOPY N/A 2022    Procedure: ENTEROSCOPY Upper SBE;  Surgeon: Salo Frank MD;  Location: [facility];  Service: Endoscopy;  Laterality: N/A;    SUBTOTAL COLECTOMY  2012    transverse colon, for incarcerated umbilical hernia, Dr. Kat Bower     Family History   Problem Relation Age of Onset    Diabetes Sister     Cataracts Sister     Heart disease Brother     Cataracts Brother     Leukemia Mother     Cancer Neg Hx     Amblyopia Neg Hx     Blindness Neg Hx     Glaucoma Neg Hx     Hypertension Neg Hx     Macular degeneration Neg Hx     Retinal detachment Neg Hx     Strabismus Neg Hx     Stroke Neg Hx     Thyroid disease Neg Hx     Kidney disease Neg Hx      Social History     Tobacco Use    Smoking status: Former     Packs/day: 0.50     Years: 15.00     Pack years: 7.50     Types: Cigarettes     Quit date: 1982     Years since quittin.9    Smokeless tobacco: Former    Tobacco comments:     smoked one pack per week   Substance Use Topics    Alcohol use: No    Drug use: No     Review of Systems   Unable to perform ROS: Medical condition   OBJECTIVE:     Vital  "Signs:  Temp:  [90.5 °F (32.5 °C)-99.1 °F (37.3 °C)]   Pulse:  [55-99]   Resp:  [17-20]   BP: (105-131)/(52-62)   SpO2:  [92 %-96 %]     Physical Exam  Constitutional:       General: She is not in acute distress.     Appearance: She is obese. She is ill-appearing. She is not toxic-appearing.   HENT:      Head: Normocephalic and atraumatic.      Mouth/Throat:      Mouth: Mucous membranes are dry.      Pharynx: Oropharynx is clear.   Eyes:      Comments: Keeps eyes closed throughout interview   Cardiovascular:      Rate and Rhythm: Normal rate and regular rhythm.   Pulmonary:      Effort: Pulmonary effort is normal. No respiratory distress.      Breath sounds: No wheezing or rales.      Comments: Left sided pleural effusion  Abdominal:      Palpations: Abdomen is soft.      Tenderness: There is no abdominal tenderness. There is no guarding or rebound.      Comments: obese   Musculoskeletal:         General: Deformity (bilateral LE) present.      Cervical back: Normal range of motion and neck supple.   Skin:     Findings: Erythema, lesion and rash present.      Comments: Extensive bruising and older coin-shaped lesions on BL Les without purulent drainage; scattered erythema and petechial lesions   Neurological:      Comments: Significant encephalopathy; keeps eyes closed, speaks clearly but irrational statements; repeats "hurry up" again and again in the room; no clear focal deficits though full neurologic exam severely limited   Psychiatric:      Comments: Unable to fully assess given encephalopathy     Laboratory:  I have reviewed all pertinent lab results within the past 24 hours.    Diagnostic Results:  Labs: Reviewed  CT: Reviewed  Echo: Reviewed    ASSESSMENT/PLAN:     See above    "

## 2023-05-27 NOTE — ASSESSMENT & PLAN NOTE
Advance Care Planning     Date: 05/26/2023    Code Status  In light of the patients advanced and life limiting illness,I engaged the the patient in a conversation about the patient's preferences for care  at the very end of life. The patient wishes to have a natural, peaceful death.  Along those lines, the patient does not wish to have CPR or other invasive treatments performed when her heart and/or breathing stops. I communicated to the patient that a DNR order would be placed in her medical record to reflect this preference.  I spent a total of 6 minutes engaging the patient in this advance care planning discussion.    JoannOST in paper chart.  Picture uploaded into media

## 2023-05-27 NOTE — ASSESSMENT & PLAN NOTE
· Patient reports sensation of something in her throat when swallowing   · She reports that she is able to swallow pills and food  · She denies choking or coughing with swallowing   · Passed bedside swallow study  · CXR: Pulmonary findings suggest edema and pleural effusions.  No convincing large focal consolidation however developing left lower lung zone consolidation not excluded  · SLP ordered  · Patient would benefit from ENT evaluation   · Consult order placed but unsure if they we will see her while she is admitted or if she will have to follow up outpatient  · CT soft tissue neck:  No neck mass or adenopathy; loculated left pleural effusion collection; enlargement of the right lobe of the thyroid gland  · Thyroid panel ordered

## 2023-05-27 NOTE — PROGRESS NOTES
Pharmacokinetic Initial Assessment: IV Vancomycin    Assessment/Plan:    Initiate intravenous vancomycin with loading dose of 2000 mg once with subsequent doses when random concentrations are less than 20 mcg/mL  Desired empiric serum trough concentration is 15 to 20 mcg/mL  Draw vancomycin random level on 5/28 at 1600.  Pharmacy will continue to follow and monitor vancomycin.      Please contact pharmacy at extension [phone] with any questions regarding this assessment.     Thank you for the consult,   Oneida Del Rio       Patient brief summary:  Sherin Ortiz is a 78 y.o. female initiated on antimicrobial therapy with IV Vancomycin for treatment of suspected  pneumonia     Drug Allergies:   Review of patient's allergies indicates:   Allergen Reactions    Penicillins Hives     Other reaction(s): Hives  Patient has received cefdinir, ceftriaxone, cefazolin and cefepime in the past with no documented reactions    Sulfa (sulfonamide antibiotics) Other (See Comments)     Eyad, pt states her doctor told her the shakes were possibly caused by an allergy to sulfa       Actual Body Weight:   119.1 kg     Renal Function:   Estimated Creatinine Clearance: 26.1 mL/min (A) (based on SCr of 2.3 mg/dL (H)).,     CBC (last 72 hours):  Recent Labs   Lab Result Units 05/26/23  1656 05/27/23  0440   WBC K/uL 1.86* 1.81*   Hemoglobin g/dL 11.1* 9.7*   Hematocrit % 38.1 32.0*   Platelets K/uL 82* 89*   Gran % % 79.0* 77.0*   Lymph % % 15.0* 21.0   Mono % % 6.0 2.0*   Eosinophil % % 0.0 0.0   Basophil % % 0.0 0.0   Differential Method  Manual Automated       Metabolic Panel (last 72 hours):  Recent Labs   Lab Result Units 05/26/23  1656 05/27/23  0038 05/27/23  0440 05/27/23  0801 05/27/23  1251 05/27/23  1318   Sodium mmol/L 143 143 144  144 144 143  --    Potassium mmol/L 5.3* 5.5* 5.7*  5.7* 5.9* 6.4*  --    Chloride mmol/L 110 111* 111*  111* 111* 110  --    CO2 mmol/L 27 28 29  29 29 23  --    Glucose mg/dL 91 99 72  71  48* 72  --    Glucose, UA   --   --   --   --   --  1+*  1+*   BUN mg/dL 25* 27* 25*  26* 28* 28*  --    Creatinine mg/dL 1.9* 1.9* 2.0*  2.0* 2.0* 2.3*  --    Albumin g/dL 2.5*  --  2.2*  --   --   --    Total Bilirubin mg/dL 0.4  --  0.3  --   --   --    Alkaline Phosphatase U/L 197*  --  168*  --   --   --    AST U/L 42*  --  42*  --   --   --    ALT U/L 33  --  31  --   --   --    Magnesium mg/dL  --   --  1.9  --   --   --    Phosphorus mg/dL  --   --  4.1  --   --   --        Drug levels (last 3 results):  No results for input(s): VANCOMYCINRA, VANCORANDOM, VANCOMYCINPE, VANCOPEAK, VANCOMYCINTR, VANCOTROUGH in the last 72 hours.    Microbiologic Results:  Microbiology Results (last 7 days)       Procedure Component Value Units Date/Time    Blood culture x two cultures. Draw prior to antibiotics. [[id number]] Collected: 05/26/23 2057    Order Status: Completed Specimen: Blood from Peripheral, Forearm, Right Updated: 05/27/23 0915     Blood Culture, Routine No Growth to date    Narrative:      Aerobic and anaerobic    Blood culture x two cultures. Draw prior to antibiotics. [[id number]] Collected: 05/26/23 2040    Order Status: Completed Specimen: Blood from Peripheral, Forearm, Right Updated: 05/27/23 0915     Blood Culture, Routine No Growth to date    Narrative:      Aerobic and anaerobic

## 2023-05-27 NOTE — ASSESSMENT & PLAN NOTE
Relative pancytopenia in setting of suspected underlying infective process.  Per discussion with Hematology, order pathology review of CBC  Repeat CBC

## 2023-05-27 NOTE — HPI
Patient is an 78-year-old female with HTN, DMT2, HLD, obesity, TIA, CKD stage 4, and PVD.  She was brought in from her nursing home on 05/26/2023 for evaluation of difficulty swallowing for the past month, though patient mentions that the difficulty swallowing has been going on for a long time, but would not specify months or years.  She states that she can swallow pills and food but it feels like it is stuck in her throat.  She denies coughing or choking with swallowing and any symptoms of aspiration.  She does not report pain with swallowing.  She denies changes in voice.  However, she does report an occasional productive cough with thick white sputum and SOB.  She denies N/V/D, abdominal pain, fever, chills, or trouble speaking.  Patient is a poor historian and was sleepy throughout my examination and history taking.    She does not report any previous history of thyroid issues, thyroid surgeries, or any family history of thyroid disease or cancers.

## 2023-05-27 NOTE — ASSESSMENT & PLAN NOTE
Patient's FSGs are controlled on current medication regimen.  Last A1c reviewed-   Lab Results   Component Value Date    HGBA1C 8.1 (H) 03/23/2023     Most recent fingerstick glucose reviewed-   Recent Labs   Lab 05/26/23 2110   POCTGLUCOSE 103     Current correctional scale  Low  Maintain anti-hyperglycemic dose as follows-   Antihyperglycemics (From admission, onward)    Start     Stop Route Frequency Ordered    05/26/23 2100  insulin detemir U-100 (Levemir) pen 8 Units         -- SubQ Nightly 05/26/23 2008 05/26/23 2055  insulin aspart U-100 pen 0-5 Units         -- SubQ Before meals & nightly PRN 05/26/23 2008        Hold Oral hypoglycemics while patient is in the hospital.

## 2023-05-27 NOTE — ASSESSMENT & PLAN NOTE
Large left pleural effusion, concerning for underlying infective focus  Pulmonology consulted for thoracentesis  Holding antibiotics until thoracentesis can be performed

## 2023-05-27 NOTE — H&P
[facility] Emergency Dept  [facility] Medicine  History & Physical    Patient Name: Sherin Ortiz  MRN: [id number]  Patient Class: OP- Observation  Admission Date: 5/26/2023  Attending Physician: Derrick Viera, *   Primary Care Provider: Darby Baum MD         Patient information was obtained from patient, relative(s), nursing home, past medical records and ER records.     Subjective:     Principal Problem:Dysphagia    Chief Complaint:   Chief Complaint   Patient presents with    Sore Throat     Brought in by [facility] from [facility]. Complaint from nursing home of pt having difficulty swallowing, sensation of something in throat. Pt able to eat and swallow PO meds whole this AM.        HPI: Sherin Ortiz is an 78-year-old female with PMH HTN, DMT2, HLD, debility, HFpEF, obesity, TIA, CKD stage 4, and PVD.  She was brought in from her nursing home for evaluation of difficulty swallowing for the past month.  She states that she can swallow pills and food but it feels like it is stuck in her throat.  She denies coughing or choking with swallowing.  However, she does report an occasional productive cough with thick white sputum and SOB.  She denies CP, N/V/D, abdominal pain, fever, chills, or trouble speaking.  Patient is a poor historian, spoke with her daughter via telephone who confirms the above history.      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    Benign essential hypertension 11/14/2012    Cataract     s/p phacoemulsification    Chronic kidney disease (CKD), stage III (moderate) 9/12/2013    Diabetic peripheral neuropathy associated with type 2 diabetes mellitus 11/14/2014    causing right hemiparesis    Gait disorder     Hyperlipidemia     Iritis - Both Eyes 6/10/2013     Kidney stone     Lymphedema     Morbid obesity with BMI of 40.0-44.9, adult 2/18/2015    Nephrolithiasis 4/20/2016    NS (nuclear sclerosis) 4/1/2013    Nuclear sclerosis - Both Eyes 4/29/2013    Preseptal cellulitis - Right Eye 4/29/2013    Proliferative diabetic retinopathy - Both Eyes 4/29/2013    Proliferative diabetic retinopathy, both eyes 4/1/2013    PSC (posterior subcapsular cataract) - Both Eyes 4/29/2013    S/P hernia repair 12/19/2012    TIA (transient ischemic attack) 11/18/2014    Tinea pedis 7/24/2012    Tinea pedis is present on both feet.     Type 2 diabetes mellitus with diabetic polyneuropathy, with long-term current use of insulin 9/18/2015    Type 2 diabetes mellitus with renal manifestations, controlled 12/12/2013    Type 2 diabetes, controlled, with moderate nonproliferative diabetic retinopathy without macular edema 9/17/2015    Ulcer of left lower extremity, limited to breakdown of skin 7/8/2015    Unspecified cerebral artery occlusion with cerebral infarction 11/16/2014    Unspecified venous (peripheral) insufficiency     Ureteral stone with hydronephrosis 1/27/2016    UTI (lower urinary tract infection)     Vaginal infection     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       Past Surgical History:   Procedure Laterality Date    ABLATION Bilateral 6/23/2022    Procedure: Ablation;  Surgeon: Vahid Farfan MD;  Location: [facility] CATH LAB/EP;  Service: Cardiology;  Laterality: Bilateral;    ABLATION Right 11/17/2022    Procedure: Ablation;  Surgeon: Vahid Farfan MD;  Location: [facility] CATH LAB/EP;  Service: Cardiology;  Laterality: Right;    APPENDECTOMY      CATARACT EXTRACTION W/  INTRAOCULAR LENS IMPLANT Left 5/21/2013    CATARACT EXTRACTION W/  INTRAOCULAR LENS IMPLANT Right 6/4/2013    CHOLECYSTECTOMY      COLONOSCOPY  12/22/2005    normal    COLONOSCOPY N/A 7/14/2022    Procedure: COLONOSCOPY;  Surgeon: Kannan Mace MD;  Location: [facility] ENDO;  Service: Endoscopy;  Laterality: N/A;     ESOPHAGOGASTRODUODENOSCOPY  12/21/2015    hiatal hernia, Schatzki ring    ESOPHAGOGASTRODUODENOSCOPY N/A 7/13/2022    Procedure: EGD (ESOPHAGOGASTRODUODENOSCOPY);  Surgeon: Kannan Mace MD;  Location: [facility] ENDO;  Service: Endoscopy;  Laterality: N/A;    EYE SURGERY Bilateral 2008    laser surgery both eyes    INTRALUMINAL GASTROINTESTINAL TRACT IMAGING VIA CAPSULE N/A 7/15/2022    Procedure: IMAGING PROCEDURE, GI TRACT, INTRALUMINAL, VIA CAPSULE;  Surgeon: Kannan Mace MD;  Location: [facility] ENDO;  Service: Endoscopy;  Laterality: N/A;    NASAL SEPTUM SURGERY      SMALL BOWEL ENTEROSCOPY N/A 7/18/2022    Procedure: ENTEROSCOPY Upper SBE;  Surgeon: Salo Frank MD;  Location: [facility] ENDO;  Service: Endoscopy;  Laterality: N/A;    SUBTOTAL COLECTOMY  12/13/2012    transverse colon, for incarcerated umbilical hernia, Dr. Kat Bower       Review of patient's allergies indicates:   Allergen Reactions    Penicillins Hives     Other reaction(s): Hives  Patient has received cefdinir, ceftriaxone, cefazolin and cefepime in the past with no documented reactions    Sulfa (sulfonamide antibiotics) Other (See Comments)     Olikes, pt states her doctor told her the shakes were possibly caused by an allergy to sulfa       No current facility-administered medications on file prior to encounter.     Current Outpatient Medications on File Prior to Encounter   Medication Sig    amLODIPine (NORVASC) 10 MG tablet Take 1 tablet (10 mg total) by mouth once daily.    aspirin 81 MG Chew Chew 1 tablet (81 mg total) by mouth once daily.    atorvastatin (LIPITOR) 40 MG tablet Take 1 tablet (40 mg total) by mouth every evening.    blood sugar diagnostic (TRUE METRIX GLUCOSE TEST STRIP) Strp TEST BLOOD SUGAR TWICE DAILY    diclofenac sodium (VOLTAREN) 1 % Gel Apply 2 g topically once daily. Apply to L Thumb as needed    furosemide (LASIX) 40 MG tablet Take 1 tablet (40 mg total) by mouth daily as needed (For worsening  shortness of breath, swelling, or 3lb weight gain on scale).    insulin glargine (LANTUS U-100 INSULIN) 100 unit/mL injection Inject 15 Units into the skin every evening.    lancets (TRUEPLUS LANCETS) 33 gauge Misc TEST TWO TIMES DAILY    lisinopriL (PRINIVIL,ZESTRIL) 5 MG tablet Take 1 tablet (5 mg total) by mouth once daily.    pantoprazole (PROTONIX) 40 MG tablet Take 1 tablet (40 mg total) by mouth once daily.    sodium bicarbonate 650 MG tablet Take 1 tablet (650 mg total) by mouth 2 (two) times daily.    [DISCONTINUED] blood glucose control, high (TRUE METRIX LEVEL 3) Soln Used to calibrate weekly     Family History       Problem Relation (Age of Onset)    Cataracts Sister, Brother    Diabetes Sister    Heart disease Brother    Leukemia Mother          Tobacco Use    Smoking status: Former     Packs/day: 0.50     Years: 15.00     Pack years: 7.50     Types: Cigarettes     Quit date: 1982     Years since quittin.9    Smokeless tobacco: Former    Tobacco comments:     smoked one pack per week   Substance and Sexual Activity    Alcohol use: No    Drug use: No    Sexual activity: Not Currently     Review of Systems   Constitutional:  Negative for chills and fever.   HENT:  Negative for drooling.         Foreign body sensation in throat   Respiratory:  Positive for cough and shortness of breath. Negative for choking, wheezing and stridor.    Cardiovascular:  Negative for chest pain.   Gastrointestinal:  Negative for abdominal pain, diarrhea, nausea and vomiting.   Genitourinary:  Negative for decreased urine volume.   Musculoskeletal:  Positive for gait problem (Wheelchair-bound). Negative for neck pain and neck stiffness.   Skin:  Positive for wound (Venous stasis discoloration/wound).   Neurological:  Negative for dizziness, speech difficulty, light-headedness and headaches.   Psychiatric/Behavioral:  Negative for agitation and confusion. The patient is not nervous/anxious.    Objective:     Vital Signs  (Most Recent):  Temp: (!) 90.2 °F (32.3 °C) (05/26/23 2118)  Pulse: (!) 49 (05/26/23 1904)  Resp: 18 (05/26/23 1904)  BP: (!) 148/70 (05/26/23 1904)  SpO2: 97 % (05/26/23 1904) Vital Signs (24h Range):  Temp:  [89 °F (31.7 °C)-97 °F (36.1 °C)] 90.2 °F (32.3 °C)  Pulse:  [48-51] 49  Resp:  [17-20] 18  SpO2:  [97 %-100 %] 97 %  BP: (132-148)/(61-70) 148/70     Weight: 117.9 kg (260 lb)  Body mass index is 40.72 kg/m².     Physical Exam  Vitals and nursing note reviewed.   Constitutional:       General: She is not in acute distress.     Appearance: She is obese. She is ill-appearing. She is not toxic-appearing.   HENT:      Head: Normocephalic and atraumatic.   Eyes:      Pupils: Pupils are equal, round, and reactive to light.   Neck:      Thyroid: Thyromegaly present.      Trachea: No tracheal deviation.   Cardiovascular:      Rate and Rhythm: Regular rhythm. Bradycardia present.      Pulses:           Dorsalis pedis pulses are 1+ on the right side and 1+ on the left side.      Heart sounds: Normal heart sounds.   Pulmonary:      Effort: Pulmonary effort is normal. No respiratory distress.      Breath sounds: Decreased breath sounds present. No wheezing.   Abdominal:      General: Bowel sounds are normal. There is no distension.      Palpations: Abdomen is soft.      Tenderness: There is no abdominal tenderness. There is no rebound.   Musculoskeletal:      Cervical back: Normal range of motion.      Right lower leg: Tenderness present. 2+ Edema present.      Left lower leg: Tenderness present. 2+ Edema present.   Lymphadenopathy:      Cervical: No cervical adenopathy.   Skin:     General: Skin is warm.      Findings: Wound present.      Comments: Discoloration BLE; small abrasion/wound w/ pink base RLE - no drainage or warmth noted   Neurological:      Mental Status: She is alert and oriented to person, place, and time.   Psychiatric:         Mood and Affect: Mood normal.         Behavior: Behavior normal.          Thought Content: Thought content normal.         Judgment: Judgment normal.            CRANIAL NERVES     CN III, IV, VI   Pupils are equal, round, and reactive to light.     Significant Labs: All pertinent labs within the past 24 hours have been reviewed.    Significant Imaging: I have reviewed all pertinent imaging results/findings within the past 24 hours.    Assessment/Plan:     * Dysphagia  Patient reports sensation of something in her throat when swallowing   She reports that she is able to swallow pills and food  She denies choking or coughing with swallowing   Passed bedside swallow study  CXR: Pulmonary findings suggest edema and pleural effusions.  No convincing large focal consolidation however developing left lower lung zone consolidation not excluded  SLP ordered  Patient would benefit from ENT evaluation   Consult order placed but unsure if they we will see her while she is admitted or if she will have to follow up outpatient  CT soft tissue neck:  No neck mass or adenopathy; loculated left pleural effusion collection; enlargement of the right lobe of the thyroid gland  Thyroid panel ordered      Bradycardia  Asymptomatic   Possibly 2/2 hypothyroidism given associated hypothermia  Treat hypothermia with Hernesto Hugger  TSH 3.930   T3 and T4 in process  Maintain telemetry  Will hold off on medications at this time unless patient becomes symptomatic         Leukopenia   WBC 1.86   Possible etiology infectious vs malignant  Lactate and procalcitonin normal  Blood culture in process  UA pending collection  Given associated thrombocytopenia, Hem/Onc consult ordered  Will h/o on anticoagulation at this time      Pleural effusion  Noted on CT and CXR   Pulmonology consult ordered for possible thoracentesis      ACP (advance care planning)  Advance Care Planning     Date: 05/26/2023    Code Status  In light of the patients advanced and life limiting illness,I engaged the the patient in a conversation about the  patient's preferences for care  at the very end of life. The patient wishes to have a natural, peaceful death.  Along those lines, the patient does not wish to have CPR or other invasive treatments performed when her heart and/or breathing stops. I communicated to the patient that a DNR order would be placed in her medical record to reflect this preference.  I spent a total of 6 minutes engaging the patient in this advance care planning discussion.    LaPOST in paper chart.  Picture uploaded into media           Venous insufficiency of both lower extremities  BLE discoloration, edema, decreased pulses  Small venous stasis ulcer noted on RLE   Wound care consulted      Acute renal failure superimposed on stage 3 chronic kidney disease  Kidney function still decreased from patient's baseline but does seem to be improving from most recent hospitalization   Patient was scheduled to have follow-up appointment with Nephrology 5/26, however since she was brought into the hospital she and family are requesting to see Nephrology while she is here  Avoid nephrotoxins   Renally dose meds      (HFpEF) heart failure with preserved ejection fraction  Echo  The estimated ejection fraction is 60%.  The left ventricle is normal in size with concentric remodeling and normal systolic function.  Normal left ventricular diastolic function.  Normal right ventricular size with normal right ventricular systolic function.  Severe left atrial enlargement.  The estimated PA systolic pressure is 39 mmHg.  Intermediate central venous pressure (8 mmHg).  Mild right atrial enlargement.  Continue aspirin and statin      Hyperkalemia  K 5.3   Will give dose of Lokelma  Maintain telemetry      Wheelchair dependent  Q.2 hours turns  Maintain safety precautions      Anemia of chronic disease  H/H 11.3/38.1  Monitor      Type 2 diabetes mellitus with diabetic polyneuropathy, with long-term current use of insulin  Patient's FSGs are controlled on  current medication regimen.  Last A1c reviewed-   Lab Results   Component Value Date    HGBA1C 8.1 (H) 03/23/2023     Most recent fingerstick glucose reviewed-   Recent Labs   Lab 05/26/23  2110   POCTGLUCOSE 103     Current correctional scale  Low  Maintain anti-hyperglycemic dose as follows-   Antihyperglycemics (From admission, onward)      Start     Stop Route Frequency Ordered    05/26/23 2100  insulin detemir U-100 (Levemir) pen 8 Units         -- SubQ Nightly 05/26/23 2008 05/26/23 2055  insulin aspart U-100 pen 0-5 Units         -- SubQ Before meals & nightly PRN 05/26/23 2008          Hold Oral hypoglycemics while patient is in the hospital.    Debility  Wheelchair-bound      Essential hypertension  Continue amlodipine  Will hold off on lisinopril at this time given LOREN and hyperkalemia      Hyperlipidemia LDL goal <100  Continue statin        VTE Risk Mitigation (From admission, onward)           Ordered     Reason for No Pharmacological VTE Prophylaxis  Once        Question:  Reasons:  Answer:  Thrombocytopenia    05/26/23 2008     IP VTE HIGH RISK PATIENT  Once         05/26/23 2008     Place sequential compression device  Until discontinued         05/26/23 2008                         On 05/26/2023, patient should be placed in hospital observation services under my care in collaboration with Derrick Viera MD.      Sherin Mckeon NP  Department of Hospital Medicine  [facility] - Emergency Dept

## 2023-05-27 NOTE — ASSESSMENT & PLAN NOTE
· Kidney function still decreased from patient's baseline but does seem to be improving from most recent hospitalization   · Patient was scheduled to have follow-up appointment with Nephrology 5/26, however since she was brought into the hospital she and family are requesting to see Nephrology while she is here  · Avoid nephrotoxins   · Renally dose meds

## 2023-05-27 NOTE — NURSING
BRIEF FOLLOW-UP NOTE       Time of Chart Review: 1600  Time spent in chart: < 15 min    SITUATION    Last Vitals:  Temp: 99.1 °F (37.3 °C) (05/27 1103)  Pulse: 74 (05/27 1556)  Resp: 17 (05/27 1103)  BP: 108/52 (05/27 1103)  SpO2: 92 % (05/27 1103)    ASSESSMENT/INTERVENTIONS  - Lokelma TID  - Orders placed to shift potassium (D10W, insulin, calcium gluconate)   Latest Reference Range & Units 05/27/23 08:01 05/27/23 12:51   Potassium 3.5 - 5.1 mmol/L 5.9 (H) 6.4 (HH)     Disposition:Remain in room 460    FOLLOW UP    Call back the [facility] Response Nurse[facility]ta at [phone] for additional questions or concerns.

## 2023-05-27 NOTE — NURSING
Pt lethargic but oriented to time, place, situation. Difficult transfer from stretcher to hospital bed. Rectal temp by digital thermometer initially read lo then stopped at 90.5 rectal. Hernesto Hugger applied with blankets. Pt denies pain. Purewick placed. Bilat lower extremities edematous, pink with sloughing skin. Not warm. On room air. Tele is Sb to Sr rate of 55  to 60 with bundle branch block.

## 2023-05-27 NOTE — HPI
Sherin Ortiz is an 78-year-old female with PMH HTN, DMT2, HLD, debility, HFpEF, obesity, TIA, CKD stage 4, and PVD.  She was brought in from her nursing home for evaluation of difficulty swallowing for the past month.  She states that she can swallow pills and food but it feels like it is stuck in her throat.  She denies coughing or choking with swallowing.  However, she does report an occasional productive cough with thick white sputum and SOB.  She denies CP, N/V/D, abdominal pain, fever, chills, or trouble speaking.  Patient is a poor historian, spoke with her daughter via telephone who confirms the above history.

## 2023-05-27 NOTE — PROGRESS NOTES
Update to plan of care:    Goals of care discussion was held with the patient's family.  Per discussion with the patient's eldest daughter, patient has previously expressed that she does not desire to return to hospital or undergo invasive procedures.  The patient's family believes that is consistent with the patient's wishes to undergo maximum medical management at this time.  DNR status confirmed at bedside.  Patient's family states that it is not consistent with the patient's wishes to have undergo invasive procedures including hemodialysis.  It was explained that she has worsening hyperkalemia and will continue to undergo maximum medical management for the condition short of initiating hemodialysis.  Plan of care for this evening was reviewed with the family.      Start empiric antibiotics with vancomycin, ceftriaxone, and metronidazole    Continue D5 bicarbonate infusion   Continue Lokelma t.i.d. via NG tube  Due to lack of D50 ampules, D10 bolus will be given with 10 units of IV insulin to shift potassium.  IV Lasix once   IV calcium gluconate once  Start lactulose t.i.d. via NG tube

## 2023-05-27 NOTE — PT/OT/SLP EVAL
Speech Language Pathology Evaluation  Bedside Swallow    Patient Name:  Sherin Otriz   MRN:  [id number]  Admitting Diagnosis: Dysphagia    Recommendations:                 General Recommendations:  Would maybe benefit from a GI evaluation, Dysphagia therapy, and ongoing swallow eval -- May need to consider temporary alternative means of nutrition in upcoming days if no improvement/progression   Diet recommendations:  Clear Liquids PO diet with protein shakes added to all trays   Aspiration Precautions:    - 1:1 assist for ALL PO intake  - Add protein shakes to ALL trays  - Would recommend IV administration of meds at this time   - Slow rate of intake   - Upright, awake and alert for ALL PO intake and remain upright for 30 mins s/p meals   - Discontinue if overt s/s of aspiration are noted (coughing, choking, red in the face, watery eyes, dcr'd respiratory support, wet vocal quality, etc.)  General Precautions: Standard, fall, contact, neutropenic  Communication strategies:   Requires frequent redirection via verbal cueing and tactile stimulation. Required max prompting and repetition to participate in session today.     Assessment:     Sherin Ortiz is a 78 y.o. female with an SLP diagnosis of Dysphagia.  However, pt's current mental status adversely impacted pt's participation in clinical swallow eval and overall diet recs. Pt lethargic/difficulty maintaining wakeful state, sustaining attention and demonstrated reduced mental flexibility. Pt appeared to tolerate all trials of administered thin liquids and x1 pudding trial with no overt s/s of aspiration. However, pt with consistent belching s/p swallow all PO trials and c/o odynophagia s/p swallow puree texture.     History:   HPI: Sherin Ortiz is an 78-year-old female with PMH HTN, DMT2, HLD, debility, HFpEF, obesity, TIA, CKD stage 4, and PVD.  She was brought in from her nursing home for evaluation of difficulty swallowing for the past month.  She  states that she can swallow pills and food but it feels like it is stuck in her throat.  She denies coughing or choking with swallowing.  However, she does report an occasional productive cough with thick white sputum and SOB.  She denies CP, N/V/D, abdominal pain, fever, chills, or trouble speaking.  Patient is a poor historian, spoke with her daughter via telephone who confirms the above history.       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    Benign essential hypertension 11/14/2012    Cataract     s/p phacoemulsification    Chronic kidney disease (CKD), stage III (moderate) 9/12/2013    Diabetic peripheral neuropathy associated with type 2 diabetes mellitus 11/14/2014    causing right hemiparesis    Gait disorder     Hyperlipidemia     Iritis - Both Eyes 6/10/2013    Kidney stone     Lymphedema     Morbid obesity with BMI of 40.0-44.9, adult 2/18/2015    Nephrolithiasis 4/20/2016    NS (nuclear sclerosis) 4/1/2013    Nuclear sclerosis - Both Eyes 4/29/2013    Preseptal cellulitis - Right Eye 4/29/2013    Proliferative diabetic retinopathy - Both Eyes 4/29/2013    Proliferative diabetic retinopathy, both eyes 4/1/2013    PSC (posterior subcapsular cataract) - Both Eyes 4/29/2013    S/P hernia repair 12/19/2012    TIA (transient ischemic attack) 11/18/2014    Tinea pedis 7/24/2012    Tinea pedis is present on both feet.     Type 2 diabetes mellitus with diabetic polyneuropathy, with long-term current use of insulin 9/18/2015    Type 2 diabetes mellitus with renal manifestations, controlled 12/12/2013    Type 2 diabetes, controlled, with moderate nonproliferative diabetic retinopathy without macular edema 9/17/2015    Ulcer of left lower extremity, limited to breakdown of skin 7/8/2015    Unspecified  cerebral artery occlusion with cerebral infarction 11/16/2014    Unspecified venous (peripheral) insufficiency     Ureteral stone with hydronephrosis 1/27/2016    UTI (lower urinary tract infection)     Vaginal infection     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       Past Surgical History:   Procedure Laterality Date    ABLATION Bilateral 6/23/2022    Procedure: Ablation;  Surgeon: Vahid Farfan MD;  Location: [facility] CATH LAB/EP;  Service: Cardiology;  Laterality: Bilateral;    ABLATION Right 11/17/2022    Procedure: Ablation;  Surgeon: Vahid Farfan MD;  Location: [facility] CATH LAB/EP;  Service: Cardiology;  Laterality: Right;    APPENDECTOMY      CATARACT EXTRACTION W/  INTRAOCULAR LENS IMPLANT Left 5/21/2013    CATARACT EXTRACTION W/  INTRAOCULAR LENS IMPLANT Right 6/4/2013    CHOLECYSTECTOMY      COLONOSCOPY  12/22/2005    normal    COLONOSCOPY N/A 7/14/2022    Procedure: COLONOSCOPY;  Surgeon: Kannan Mace MD;  Location: [facility];  Service: Endoscopy;  Laterality: N/A;    ESOPHAGOGASTRODUODENOSCOPY  12/21/2015    hiatal hernia, Schatzki ring    ESOPHAGOGASTRODUODENOSCOPY N/A 7/13/2022    Procedure: EGD (ESOPHAGOGASTRODUODENOSCOPY);  Surgeon: Kannan Mace MD;  Location: [facility];  Service: Endoscopy;  Laterality: N/A;    EYE SURGERY Bilateral 2008    laser surgery both eyes    INTRALUMINAL GASTROINTESTINAL TRACT IMAGING VIA CAPSULE N/A 7/15/2022    Procedure: IMAGING PROCEDURE, GI TRACT, INTRALUMINAL, VIA CAPSULE;  Surgeon: Kannan Mace MD;  Location: [facility];  Service: Endoscopy;  Laterality: N/A;    NASAL SEPTUM SURGERY      SMALL BOWEL ENTEROSCOPY N/A 7/18/2022    Procedure: ENTEROSCOPY Upper SBE;  Surgeon: Salo Frank MD;  Location: [facility];  Service: Endoscopy;  Laterality: N/A;    SUBTOTAL COLECTOMY  12/13/2012    transverse colon, for incarcerated umbilical hernia, Dr. Kat Bower       Social History: Patient lives at [facility].    Prior Intubation HX:   "N/A    Modified Barium Swallow: None on file per EMR    Chest X-Rays: Pulmonary findings suggest edema and pleural effusions.  No convincing large focal consolidation however developing left lower lung zone consolidation not excluded    Prior diet: Unknown at this time - pt unable to report.    Subjective     SLP consulted for clinical swallow eval 2/2 pt's c/o difficulty swallowing and globus sensation. Pt found in bed asleep -- pt with difficulty maintaining wakeful state and required frequent redirection 2/2 lethargy and perseveration on pain, despite SLP addressing complaint via RN notified, reposition and distraction. Session was limited by pt's mentation and difficulty redirecting.     Patient goals: "I can't do it. I can't swallow" prior to any PO trials. Pt required max redirection and education of purpose and importance of participating in swallow eval to determine dx and POC. Pt with limited understanding.      Pain/Comfort:  Pain Rating 1: 10/10  Location - Side 1: Bilateral  Location 1: leg  Pain Addressed 1: Nurse notified, Distraction, Reposition    Respiratory Status: Room air    Objective:       Cognition/Communication: Waxing and waning alertness, difficulty maintaining wakeful state and reduced attention. Remained with eyes closed entire session, despite max redirection. Pt demonstrated reduced understanding, limited generalization and dcr'd mental flexibility at this time, despite SLP's frequent education and redirection provided. Oriented to name and Month/day of birth only - did not answer additional orientation questions. Followed ~20-30% of simple commands when given max cueing. Functional communization was limited and responses were slow; pt perseverated on leg pain throughout session -- pt's response to majority of SLP's questions was "My legs hurt." Initially reported difficulty swallowing, then denied, but later endorsed difficulty swallow. Unreliable historian at this time, given mental " status. Pt currently relies on communication partners to anticipate and meet wants/needs and/or concerns.       Oral Musculature Evaluation  Oral Musculature: other (see comments) (unable to asses 2/2 pt's current mental status)  Dentition: edentulous  Mucosal Quality: dry  Volitional Swallow:  (unable to assess 2/2 pt's current mental status)  Voice Prior to PO Intake:  (clear vocal quality; fair volume)    Bedside Swallow Eval: Pt seen for clinical swallow eval this AM. Eval was limited 2/2 pt's mental status/lethargy. Pt appeared to tolerate ice chips, tsp sips of water, and cup sips of water, and x1 tsp bite of pudding with no overt s/s of aspiration, at bedside. However, pt observed with consistent belching s/p swallow all PO trials and pt endorsed odynophagia s/p swallow puree trial.    Consistencies Assessed:  Thin liquids - ice chips x2, tsp sips x4, and cup sips x4  Puree -pudding x1 tsp      Oral Phase:   Limited assessment; however, reduced labial seal around cup and tsp, anterior spillage for cup sips, slowed bolus prep/formation and AP transit; however, no oral residue for limited PO trials. X1 episode of pt expectorating spit.      Pharyngeal Phase:   Limited assessment; however, demonstrated fair laryngeal elevation/excursion but subsequent swallows for cup sips and x1 pudding trial, per hand palpation. No overt s/s of aspiration -- no coughing/choking, change in vocal quality and/or dcr'd respiratory noted across all PO trials. However, pt with consistent belching s/p swallow all PO trials and pt endorsed odynophagia s/p swallow pudding trial.     Compensatory Strategies  None    Treatment: SLP will continue to follow and treat pt x2-3/wk. Will advance pt's diet as able/tolerated.     Goals:   Multidisciplinary Problems       SLP Goals          Problem: SLP    Goal Priority Disciplines Outcome   SLP Goal     SLP Ongoing, Progressing   Description: Short Term Goals:  1. Pt will participate in a  clinical swallow eval to determine least restrictive diet.  2. Pt will safely tolerate >75% of Clear Liquids PO diet (with protein shakes and 1:1 assist as needed) with no overt s/s of aspiration.  3. Pt will orient x4 with min cueing  4. Pt will maintain wakeful state and sustain attention for >5 mins with min cueing   *further goals pending pt's progress*                           Plan:     Patient to be seen:  2 x/week, 3 x/week   Plan of Care expires:  06/26/23  Plan of Care reviewed with:    YRN Aponte and MD Harris  SLP Follow-Up:  Yes       Discharge recommendations:  other (see comments), home health speech therapy (TBD but likely low intensity therapy)   Barriers to Discharge:   Current mental status and nutritional needs    Time Tracking:     SLP Treatment Date:   05/27/23  Speech Start Time:  0820  Speech Stop Time:  0830     Speech Total Time (min):  10 min    Billable Minutes: Eval Swallow and Oral Function 10    05/27/2023

## 2023-05-27 NOTE — SUBJECTIVE & OBJECTIVE
Medications:  Continuous Infusions:  Scheduled Meds:   aspirin  81 mg Oral Daily    atorvastatin  40 mg Oral QHS    insulin detemir U-100  8 Units Subcutaneous QHS    pantoprazole  40 mg Oral Daily    sodium zirconium cyclosilicate  10 g Oral TID     PRN Meds:acetaminophen, dextrose 10%, dextrose 10%, dextrose, dextrose, glucagon (human recombinant), insulin aspart U-100, melatonin, naloxone, ondansetron, sodium chloride 0.9%     No current facility-administered medications on file prior to encounter.     Current Outpatient Medications on File Prior to Encounter   Medication Sig    amLODIPine (NORVASC) 10 MG tablet Take 1 tablet (10 mg total) by mouth once daily.    aspirin 81 MG Chew Chew 1 tablet (81 mg total) by mouth once daily.    atorvastatin (LIPITOR) 40 MG tablet Take 1 tablet (40 mg total) by mouth every evening.    diclofenac sodium (VOLTAREN) 1 % Gel Apply 2 g topically once daily. Apply to L Thumb as needed    furosemide (LASIX) 40 MG tablet Take 1 tablet (40 mg total) by mouth daily as needed (For worsening shortness of breath, swelling, or 3lb weight gain on scale).    insulin glargine (LANTUS U-100 INSULIN) 100 unit/mL injection Inject 15 Units into the skin every evening.    lancets (TRUEPLUS LANCETS) 33 gauge Misc TEST TWO TIMES DAILY    lisinopriL (PRINIVIL,ZESTRIL) 5 MG tablet Take 1 tablet (5 mg total) by mouth once daily.    pantoprazole (PROTONIX) 40 MG tablet Take 1 tablet (40 mg total) by mouth once daily.    sodium bicarbonate 650 MG tablet Take 1 tablet (650 mg total) by mouth 2 (two) times daily.    blood sugar diagnostic (TRUE METRIX GLUCOSE TEST STRIP) Strp TEST BLOOD SUGAR TWICE DAILY    [DISCONTINUED] blood glucose control, high (TRUE METRIX LEVEL 3) Soln Used to calibrate weekly       Review of patient's allergies indicates:   Allergen Reactions    Penicillins Hives     Other reaction(s): Hives  Patient has received cefdinir, ceftriaxone, cefazolin and cefepime in the past with no  documented reactions    Sulfa (sulfonamide antibiotics) Other (See Comments)     Shakes, pt states her doctor told her the shakes were possibly caused by an allergy to sulfa       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    Benign essential hypertension 11/14/2012    Cataract     s/p phacoemulsification    Chronic kidney disease (CKD), stage III (moderate) 9/12/2013    Diabetic peripheral neuropathy associated with type 2 diabetes mellitus 11/14/2014    causing right hemiparesis    Gait disorder     Hyperlipidemia     Iritis - Both Eyes 6/10/2013    Kidney stone     Lymphedema     Morbid obesity with BMI of 40.0-44.9, adult 2/18/2015    Nephrolithiasis 4/20/2016    NS (nuclear sclerosis) 4/1/2013    Nuclear sclerosis - Both Eyes 4/29/2013    Preseptal cellulitis - Right Eye 4/29/2013    Proliferative diabetic retinopathy - Both Eyes 4/29/2013    Proliferative diabetic retinopathy, both eyes 4/1/2013    PSC (posterior subcapsular cataract) - Both Eyes 4/29/2013    S/P hernia repair 12/19/2012    TIA (transient ischemic attack) 11/18/2014    Tinea pedis 7/24/2012    Tinea pedis is present on both feet.     Type 2 diabetes mellitus with diabetic polyneuropathy, with long-term current use of insulin 9/18/2015    Type 2 diabetes mellitus with renal manifestations, controlled 12/12/2013    Type 2 diabetes, controlled, with moderate nonproliferative diabetic retinopathy without macular edema 9/17/2015    Ulcer of left lower extremity, limited to breakdown of skin 7/8/2015    Unspecified cerebral artery occlusion with cerebral infarction 11/16/2014    Unspecified venous (peripheral) insufficiency     Ureteral stone with hydronephrosis 1/27/2016    UTI (lower urinary tract infection)     Vaginal infection     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     Past Surgical History:   Procedure Laterality Date    ABLATION Bilateral 6/23/2022    Procedure: Ablation;  Surgeon:  Vahid Farfan MD;  Location: [facility] CATH LAB/EP;  Service: Cardiology;  Laterality: Bilateral;    ABLATION Right 2022    Procedure: Ablation;  Surgeon: Vahid Farfan MD;  Location: [facility] CATH LAB/EP;  Service: Cardiology;  Laterality: Right;    APPENDECTOMY      CATARACT EXTRACTION W/  INTRAOCULAR LENS IMPLANT Left 2013    CATARACT EXTRACTION W/  INTRAOCULAR LENS IMPLANT Right 2013    CHOLECYSTECTOMY      COLONOSCOPY  2005    normal    COLONOSCOPY N/A 2022    Procedure: COLONOSCOPY;  Surgeon: Kannan Mace MD;  Location: [facility] ENDO;  Service: Endoscopy;  Laterality: N/A;    ESOPHAGOGASTRODUODENOSCOPY  2015    hiatal hernia, Schatzki ring    ESOPHAGOGASTRODUODENOSCOPY N/A 2022    Procedure: EGD (ESOPHAGOGASTRODUODENOSCOPY);  Surgeon: Kannan Mace MD;  Location: [facility];  Service: Endoscopy;  Laterality: N/A;    EYE SURGERY Bilateral     laser surgery both eyes    INTRALUMINAL GASTROINTESTINAL TRACT IMAGING VIA CAPSULE N/A 7/15/2022    Procedure: IMAGING PROCEDURE, GI TRACT, INTRALUMINAL, VIA CAPSULE;  Surgeon: Kannan Mace MD;  Location: [facility];  Service: Endoscopy;  Laterality: N/A;    NASAL SEPTUM SURGERY      SMALL BOWEL ENTEROSCOPY N/A 2022    Procedure: ENTEROSCOPY Upper SBE;  Surgeon: Salo Frank MD;  Location: [facility];  Service: Endoscopy;  Laterality: N/A;    SUBTOTAL COLECTOMY  2012    transverse colon, for incarcerated umbilical hernia, Dr. Kat Bower     Family History       Problem Relation (Age of Onset)    Cataracts Sister, Brother    Diabetes Sister    Heart disease Brother    Leukemia Mother          Tobacco Use    Smoking status: Former     Packs/day: 0.50     Years: 15.00     Pack years: 7.50     Types: Cigarettes     Quit date: 1982     Years since quittin.9    Smokeless tobacco: Former    Tobacco comments:     smoked one pack per week   Substance and Sexual Activity    Alcohol use: No    Drug use: No     Sexual activity: Not Currently     Review of Systems  General: negative for chills, fever or weight loss  Psychological: negative for mood changes or depression  Ophthalmic: negative for blurry vision, photophobia or eye pain  ENT: see HPI  Respiratory: no shortness of breath, or wheezing  Cardiovascular: no dyspnea on exertion  Gastrointestinal: no abdominal pain, change in bowel habits, or black/ bloody stools  Musculoskeletal: see HPI, positive for immobility, extremity weakness and LE lymphedema  Neurological: no syncope or seizures  Dermatological: negative for pruritis, rash and jaundice  Hematologic/lymphatic: no new adenopathy    Objective:     Vital Signs (Most Recent):  Temp: 96.9 °F (36.1 °C) (05/27/23 0750)  Pulse: 83 (Remote tele review) (05/27/23 0910)  Resp: 19 (05/27/23 0745)  BP: (!) 105/54 (05/27/23 0745)  SpO2: (!) 94 % (05/27/23 0750) Vital Signs (24h Range):  Temp:  [89 °F (31.7 °C)-97 °F (36.1 °C)] 96.9 °F (36.1 °C)  Pulse:  [47-83] 83  Resp:  [15-20] 19  SpO2:  [94 %-100 %] 94 %  BP: (105-148)/(54-70) 105/54     Weight: 119.1 kg (262 lb 9.1 oz)  Body mass index is 41.12 kg/m².         Physical Exam     Constitutional:  Morbidly obese, lethargic, but able to answer questions.  Eyes: EOM I Bilaterally  Head/Face: Normocephalic.  Negative paranasal sinus pressure/tenderness.  Salivary glands WNL.  House Brackmann I Bilaterally.    Right Ear: External Auditory Canal with cerumen impaction, TM not visible.  Auricle WNL.  Left Ear: External Auditory Canal WNL,TM w/o masses/lesions/perforations. Auricle WNL.  Nose: No gross nasal septal deviation. Inferior Turbinates 2+ bilaterally.  Scant clear/whitish mucus bilaterally.  No purulent discharge or bleeding.  No septal perforation. No masses/lesions. External nasal skin without masses/lesions.  Oral Cavity: Gingiva/lips WNL.  FOM Soft, no masses palpated. Oral Tongue mobile. Hard Palate WNL.   Oropharynx: BOT WNL. No masses/lesions noted. Tonsillar  fossa/pharyngeal wall without lesions. Posterior oropharynx WNL.  Soft palate without masses. Midline uvula.   Neck/Lymphatic: No LAD I-VI bilaterally.  No thyromegaly.  No masses noted on exam.  Neuro/Psychiatric:  Arousable but lethargic.  Normal mood and affect.    Respiratory: Normal respiratory effort, no stridor, no retractions noted.       Significant Labs:  CBC:   Recent Labs   Lab 05/27/23  0440   WBC 1.81*   RBC 3.95*   HGB 9.7*   HCT 32.0*   PLT 89*   MCV 81*   MCH 24.6*   MCHC 30.3*     CMP:   Recent Labs   Lab 05/27/23  0440 05/27/23  0801   GLU 72  71 48*   CALCIUM 9.1  9.1 9.0   ALBUMIN 2.2*  --    PROT 6.2  --      144 144   K 5.7*  5.7* 5.9*   CO2 29  29 29   *  111* 111*   BUN 25*  26* 28*   CREATININE 2.0*  2.0* 2.0*   ALKPHOS 168*  --    ALT 31  --    AST 42*  --    BILITOT 0.3  --      TSH:   Recent Labs   Lab 05/26/23  2046   TSH 3.930       Significant Diagnostics:  I have reviewed and interpreted all pertinent imaging results/findings within the past 24 hours.  I agree with radiologist's interpretation of CT neck.  No significant abnormalities noted other than slight enlargement of right thyroid gland.  No distinct nodules noted.

## 2023-05-27 NOTE — PROGRESS NOTES
[facility] Medicine  Progress Note    Patient Name: Sherin Ortiz  MRN: [id number]  Patient Class: OP- Observation   Admission Date: 5/26/2023  Length of Stay: 0 days  Attending Physician: Michael Harris MD  Primary Care Provider: Darby Baum MD        Subjective:     Principal Problem:Dysphagia        HPI:  Sherin Ortiz is an 78-year-old female with PMH HTN, DMT2, HLD, debility, HFpEF, obesity, TIA, CKD stage 4, and PVD.  She was brought in from her nursing home for evaluation of difficulty swallowing for the past month.  She states that she can swallow pills and food but it feels like it is stuck in her throat.  She denies coughing or choking with swallowing.  However, she does report an occasional productive cough with thick white sputum and SOB.  She denies CP, N/V/D, abdominal pain, fever, chills, or trouble speaking.  Patient is a poor historian, spoke with her daughter via telephone who confirms the above history.          Overview/Hospital Course:  78-year-old woman with complaints of neck pain, difficulty swallowing, and progressive decline over the last 4-6 weeks.  She initially presented to the hospital for dysphagia and was found to have large left loculated pleural effusion, pancytopenia, hyperglycemia and hypothermia concerning for underlying infective process.      Interval History:  Hypoglycemic, pancytopenic, hypothermic large left loculated pleural effusion concerning for underlying infection.  ENT evaluation was relatively unrevealing.  Pulmonology was consulted for thoracentesis.    Review of Systems   Unable to perform ROS: Mental status change   Objective:     Vital Signs (Most Recent):  Temp: 99.1 °F (37.3 °C) (05/27/23 1103)  Pulse: 97 (05/27/23 1145)  Resp: 17 (05/27/23 1103)  BP: (!) 108/52 (05/27/23 1103)  SpO2: (!) 92 % (05/27/23 1103) Vital Signs (24h Range):  Temp:  [89 °F (31.7 °C)-99.1 °F (37.3 °C)] 99.1 °F (37.3 °C)  Pulse:  [47-99] 97  Resp:  [15-20]  17  SpO2:  [92 %-100 %] 92 %  BP: (105-148)/(52-70) 108/52     Weight: 119.1 kg (262 lb 9.1 oz)  Body mass index is 41.12 kg/m².    Intake/Output Summary (Last 24 hours) at 5/27/2023 1208  Last data filed at 5/27/2023 0438  Gross per 24 hour   Intake --   Output 400 ml   Net -400 ml         Physical Exam  Vitals reviewed.   Constitutional:       General: She is not in acute distress.     Appearance: She is obese. She is ill-appearing.   HENT:      Head: Normocephalic.   Eyes:      General:         Right eye: No discharge.         Left eye: No discharge.   Neck:      Comments: Trachea midline.  Cardiovascular:      Rate and Rhythm: Normal rate and regular rhythm.   Pulmonary:      Breath sounds: No wheezing or rales.      Comments: Decreased breath sounds of the left lower and left mid lung fields  Abdominal:      General: There is no distension.      Tenderness: There is no abdominal tenderness.   Musculoskeletal:      Cervical back: Rigidity present.      Right lower leg: Edema present.      Left lower leg: Edema present.   Skin:     General: Skin is warm.      Capillary Refill: Capillary refill takes less than 2 seconds.      Comments: Chronic skin changes of bilateral lower extremities.   Neurological:      Comments: Localizes.  Drowsy, but opens eyes to stimuli.  Intermittently follows commands.  Moves all 4 extremities.           Significant Labs: All pertinent labs within the past 24 hours have been reviewed.  CBC:   Recent Labs   Lab 05/26/23  1656 05/27/23  0440   WBC 1.86* 1.81*   HGB 11.1* 9.7*   HCT 38.1 32.0*   PLT 82* 89*     CMP:   Recent Labs   Lab 05/26/23  1656 05/27/23  0038 05/27/23  0440 05/27/23  0801    143 144  144 144   K 5.3* 5.5* 5.7*  5.7* 5.9*    111* 111*  111* 111*   CO2 27 28 29  29 29   GLU 91 99 72  71 48*   BUN 25* 27* 25*  26* 28*   CREATININE 1.9* 1.9* 2.0*  2.0* 2.0*   CALCIUM 9.6 9.3 9.1  9.1 9.0   PROT 7.0  --  6.2  --    ALBUMIN 2.5*  --  2.2*  --     BILITOT 0.4  --  0.3  --    ALKPHOS 197*  --  168*  --    AST 42*  --  42*  --    ALT 33  --  31  --    ANIONGAP 6* 4* 4*  4* 4*       Significant Imaging: I have reviewed all pertinent imaging results/findings within the past 24 hours.      Assessment/Plan:      * Pleural effusion, left  Large left pleural effusion, concerning for underlying infective focus  Pulmonology consulted for thoracentesis  Holding antibiotics until thoracentesis can be performed      Pancytopenia  Relative pancytopenia in setting of suspected underlying infective process.  Per discussion with Hematology, ordered pathology review of CBC  Repeat CBC      ACP (advance care planning)  Advance Care Planning     Date: 05/26/2023    Code Status  In light of the patients advanced and life limiting illness,I engaged the the patient in a conversation about the patient's preferences for care  at the very end of life. The patient wishes to have a natural, peaceful death.  Along those lines, the patient does not wish to have CPR or other invasive treatments performed when her heart and/or breathing stops. I communicated to the patient that a DNR order would be placed in her medical record to reflect this preference.  I spent a total of 6 minutes engaging the patient in this advance care planning discussion.    LaPOST in paper chart.  Picture uploaded into media           Venous insufficiency of both lower extremities  Stable.  Wound Care consulted      (HFpEF) heart failure with preserved ejection fraction  Does not appear to be in exacerbation   Continue to monitor  Hold oral diuretics      Hyperkalemia  Hold ACE-inhibitor   Continue Lokelma t.i.d.        Type 2 diabetes mellitus with diabetic polyneuropathy, with long-term current use of insulin  Patient's FSGs are controlled on current medication regimen.  Last A1c reviewed-   Lab Results   Component Value Date    HGBA1C 8.1 (H) 03/23/2023     Most recent fingerstick glucose reviewed-   Recent  Labs   Lab 05/26/23  2110 05/27/23  0436 05/27/23  0941 05/27/23  1102   POCTGLUCOSE 103 76 82 73     Current correctional scale  Low  Maintain anti-hyperglycemic dose as follows-   Antihyperglycemics (From admission, onward)    Start     Stop Route Frequency Ordered    05/26/23 2055  insulin aspart U-100 pen 0-5 Units         -- SubQ Before meals & nightly PRN 05/26/23 2008        Hold Oral hypoglycemics while patient is in the hospital.    Dysphagia  Reports of dysphagia for several weeks prior to presentation.  ENT evaluation including flexible laryngoscopy was relatively unrevealing.    Placed Dobbhoff to assist with medication administration   When mentation improves, pursue modified barium swallow, upper GI fluoroscopy, and later EGD if warranted.      Debility  Wheelchair-bound at baseline, but transfers to chair  Subacute decline over the last 4-6 weeks per discussion with family at bedside      Essential hypertension  Hold lisinopril   Hold amlodipine      Hyperlipidemia LDL goal <100  Continue statin if tolerated      VTE Risk Mitigation (From admission, onward)         Ordered     Reason for No Pharmacological VTE Prophylaxis  Once        Question:  Reasons:  Answer:  Thrombocytopenia    05/26/23 2008     IP VTE HIGH RISK PATIENT  Once         05/26/23 2008     Place sequential compression device  Until discontinued         05/26/23 2008                Discharge Planning   GABE:      Code Status: DNR   Is the patient medically ready for discharge?:     Reason for patient still in hospital (select all that apply): Treatment and Consult recommendations                     Michael Harris MD  Department of Hospital Medicine   [facility]

## 2023-05-27 NOTE — ASSESSMENT & PLAN NOTE
Wheelchair-bound at baseline, but transfers to chair  Subacute decline over the last 4-6 weeks per discussion with family at bedside

## 2023-05-27 NOTE — ASSESSMENT & PLAN NOTE
 Echo   The estimated ejection fraction is 60%.   The left ventricle is normal in size with concentric remodeling and normal systolic function.   Normal left ventricular diastolic function.   Normal right ventricular size with normal right ventricular systolic function.   Severe left atrial enlargement.   The estimated PA systolic pressure is 39 mmHg.   Intermediate central venous pressure (8 mmHg).   Mild right atrial enlargement.  · Continue aspirin and statin

## 2023-05-27 NOTE — NURSING
NURSE PROACTIVE ROUNDING NOTE       Time of Chart Review: 1200    Admit Date: 2023  LOS: 0  Code Status: DNR   Date of Visit: 2023  : 1945  Age: 78 y.o.  Sex: female  Race: White  Bed: K460/K460 A  MRN: [id number]  Was the patient discharged from an ICU this admission? No   Was the patient discharged from a PACU within last 24 hours? No   Did the patient receive conscious sedation/general anesthesia in last 24 hours? No   Was the patient in the ED within the past 24 hours? Yes   Was the patient on NIPPV within the past 24 hours? No   Attending Physician: Michael Harris MD  Primary Service: Hospitalist   Time spent at the bedside: < 15 min    SITUATION    Notified by previous RRN during handoff  Reason for alert: Hyperkalemia, Hypothermia/bradycardia overnight    Diagnosis: Dysphagia   has a past medical history of Allergy, Asteroid hyalosis - Left Eye, Benign essential hypertension, Cataract, Chronic kidney disease (CKD), stage III (moderate), Diabetic peripheral neuropathy associated with type 2 diabetes mellitus, Gait disorder, Hyperlipidemia, Iritis - Both Eyes, Kidney stone, Lymphedema, Morbid obesity with BMI of 40.0-44.9, adult, Nephrolithiasis, NS (nuclear sclerosis), Nuclear sclerosis - Both Eyes, Preseptal cellulitis - Right Eye, Proliferative diabetic retinopathy - Both Eyes, Proliferative diabetic retinopathy, both eyes, PSC (posterior subcapsular cataract) - Both Eyes, S/P hernia repair, TIA (transient ischemic attack), Tinea pedis, Type 2 diabetes mellitus with diabetic polyneuropathy, with long-term current use of insulin, Type 2 diabetes mellitus with renal manifestations, controlled, Type 2 diabetes, controlled, with moderate nonproliferative diabetic retinopathy without macular edema, Ulcer of left lower extremity, limited to breakdown of skin, Unspecified cerebral artery occlusion with cerebral infarction, Unspecified venous (peripheral) insufficiency, Ureteral stone with  hydronephrosis, UTI (lower urinary tract infection), Vaginal infection, and Vertical heterotropia - Both Eyes.    Last Vitals:  Temp: 99.1 °F (37.3 °C) (05/27 1103)  Pulse: 97 (05/27 1145)  Resp: 17 (05/27 1103)  BP: 108/52 (05/27 1103)  SpO2: 92 % (05/27 1103)    24 Hour Vitals Range:  Temp:  [89 °F (31.7 °C)-99.1 °F (37.3 °C)]   Pulse:  [47-99]   Resp:  [15-20]   BP: (105-148)/(52-70)   SpO2:  [92 %-100 %]     ASSESSMENT/INTERVENTIONS  - Chart reviewed including lab results, vital signs, and progress notes  - Lokelma given at ~10 AM, dose increased to 10 grams TID, next dose due at 1500 for hyperkalemia   Latest Reference Range & Units 05/27/23 08:01   Potassium 3.5 - 5.1 mmol/L 5.9 (H)   - BMP Q4H + at 1700  - Normothermic this morning since chandana cherry applied  - Bradycardia improved with return to normothermia  - Hypoglycemia noted on AM labs --> POCT glucose performed and CBG WNL   Latest Reference Range & Units 05/27/23 08:01   Glucose 70 - 110 mg/dL 48 (LL)      Latest Reference Range & Units 05/27/23 09:41 05/27/23 11:02   POCT Glucose 70 - 110 mg/dL 82 73     RECOMMENDATIONS  - Continue to monitor K+    PROVIDER ESCALATION    Physician escalation: No    Disposition:Remain in room 460    FOLLOW UP    Call back the Rapid Response Nurse[facility]ta at [phone] for additional questions or concerns.

## 2023-05-27 NOTE — ASSESSMENT & PLAN NOTE
· Asymptomatic   · Possibly 2/2 hypothyroidism given associated hypothermia  · Treat hypothermia with Hernesto Hugger  · TSH 3.930   · T3 and T4 in process  · Maintain telemetry  · Will hold off on medications at this time unless patient becomes symptomatic

## 2023-05-27 NOTE — NURSING
"Notified on call that pt converted from SB to Afib rate 40's to 50"s bpm. No new sx. Remains on chandana hugger. Has card hx of ablations. Thyroid dx work up this admission.  "

## 2023-05-27 NOTE — PLAN OF CARE
Problem: SLP  Goal: SLP Goal  Description: Short Term Goals:  1. Pt will participate in a clinical swallow eval to determine least restrictive diet.  2. Pt will safely tolerate >75% of Clear Liquids PO diet (with protein shakes and 1:1 assist as needed) with no overt s/s of aspiration.  3. Pt will orient x4 with min cueing  4. Pt will maintain wakeful state and sustain attention for >5 mins with min cueing   *further goals pending pt's progress*      Outcome: Ongoing, Progressing   5/27/23:  Pt seen for clinical swallow eval this AM. Eval was limited 2/2 pt's mental status/lethargy. Pt appeared to tolerate ice chips, tsp sips of water, and cup sips of water, and x1 tsp bite of pudding with no overt s/s of aspiration, at bedside. However, pt observed with consistent belching s/p swallow all PO trials and pt endorsed odynophagia s/p swallow puree trial.    SLP recs: Downgrade to Clear Liquids PO diet with the following swallow precautions:  - 1:1 assist for ALL PO intake  - Add protein shakes to ALL trays  - Would recommend IV administration of meds at this time   - Slow rate of intake   - Upright, awake and alert for ALL PO intake and remain upright for 30 mins s/p meals   - Discontinue if overt s/s of aspiration are noted (coughing, choking, red in the face, watery eyes, dcr'd respiratory support, wet vocal quality, etc.)

## 2023-05-27 NOTE — ASSESSMENT & PLAN NOTE
Reports of dysphagia for several weeks prior to presentation.  ENT evaluation including flexible laryngoscopy was relatively unrevealing.    Placed Dobbhoff to assist with medication administration   When mentation improves, pursue modified barium swallow, upper GI fluoroscopy, and later EGD if warranted.

## 2023-05-27 NOTE — CONSULTS
[facility]  Otorhinolaryngology-Head & Neck Surgery  Consult Note    Patient Name: Sherin Ortiz  MRN: [id number]  Code Status: DNR  Admission Date: 5/26/2023  Hospital Length of Stay: 0 days  Attending Physician: Michael Harris MD  Primary Care Provider: Darby Baum MD    Patient information was obtained from patient, ER records and primary team.     ENT  Consult performed by: Wendie Jacobs MD  Consult ordered by: Sherin Mckeon NP  Reason for consult: Dysphagia      Subjective:     Chief Complaint/Reason for Admission:  Difficulty swallowing, feeling something stuck in the throat    History of Present Illness: Patient is an 78-year-old female with HTN, DMT2, HLD, obesity, TIA, CKD stage 4, and PVD.  She was brought in from her nursing home on 05/26/2023 for evaluation of difficulty swallowing for the past month, though patient mentions that the difficulty swallowing has been going on for a long time, but would not specify months or years.  She states that she can swallow pills and food but it feels like it is stuck in her throat.  She denies coughing or choking with swallowing and any symptoms of aspiration.  She does not report pain with swallowing.  She denies changes in voice.  However, she does report an occasional productive cough with thick white sputum and SOB.  She denies N/V/D, abdominal pain, fever, chills, or trouble speaking.  Patient is a poor historian and was sleepy throughout my examination and history taking.    She does not report any previous history of thyroid issues, thyroid surgeries, or any family history of thyroid disease or cancers.      Medications:  Continuous Infusions:  Scheduled Meds:   aspirin  81 mg Oral Daily    atorvastatin  40 mg Oral QHS    insulin detemir U-100  8 Units Subcutaneous QHS    pantoprazole  40 mg Oral Daily    sodium zirconium cyclosilicate  10 g Oral TID     PRN Meds:acetaminophen, dextrose 10%, dextrose 10%, dextrose, dextrose, glucagon  (human recombinant), insulin aspart U-100, melatonin, naloxone, ondansetron, sodium chloride 0.9%     No current facility-administered medications on file prior to encounter.     Current Outpatient Medications on File Prior to Encounter   Medication Sig    amLODIPine (NORVASC) 10 MG tablet Take 1 tablet (10 mg total) by mouth once daily.    aspirin 81 MG Chew Chew 1 tablet (81 mg total) by mouth once daily.    atorvastatin (LIPITOR) 40 MG tablet Take 1 tablet (40 mg total) by mouth every evening.    diclofenac sodium (VOLTAREN) 1 % Gel Apply 2 g topically once daily. Apply to L Thumb as needed    furosemide (LASIX) 40 MG tablet Take 1 tablet (40 mg total) by mouth daily as needed (For worsening shortness of breath, swelling, or 3lb weight gain on scale).    insulin glargine (LANTUS U-100 INSULIN) 100 unit/mL injection Inject 15 Units into the skin every evening.    lancets (TRUEPLUS LANCETS) 33 gauge Misc TEST TWO TIMES DAILY    lisinopriL (PRINIVIL,ZESTRIL) 5 MG tablet Take 1 tablet (5 mg total) by mouth once daily.    pantoprazole (PROTONIX) 40 MG tablet Take 1 tablet (40 mg total) by mouth once daily.    sodium bicarbonate 650 MG tablet Take 1 tablet (650 mg total) by mouth 2 (two) times daily.    blood sugar diagnostic (TRUE METRIX GLUCOSE TEST STRIP) Strp TEST BLOOD SUGAR TWICE DAILY    [DISCONTINUED] blood glucose control, high (TRUE METRIX LEVEL 3) Soln Used to calibrate weekly       Review of patient's allergies indicates:   Allergen Reactions    Penicillins Hives     Other reaction(s): Hives  Patient has received cefdinir, ceftriaxone, cefazolin and cefepime in the past with no documented reactions    Sulfa (sulfonamide antibiotics) Other (See Comments)     Eyad, pt states her doctor told her the shakes were possibly caused by an allergy to sulfa       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    Benign essential hypertension 11/14/2012    Cataract      s/p phacoemulsification    Chronic kidney disease (CKD), stage III (moderate) 9/12/2013    Diabetic peripheral neuropathy associated with type 2 diabetes mellitus 11/14/2014    causing right hemiparesis    Gait disorder     Hyperlipidemia     Iritis - Both Eyes 6/10/2013    Kidney stone     Lymphedema     Morbid obesity with BMI of 40.0-44.9, adult 2/18/2015    Nephrolithiasis 4/20/2016    NS (nuclear sclerosis) 4/1/2013    Nuclear sclerosis - Both Eyes 4/29/2013    Preseptal cellulitis - Right Eye 4/29/2013    Proliferative diabetic retinopathy - Both Eyes 4/29/2013    Proliferative diabetic retinopathy, both eyes 4/1/2013    PSC (posterior subcapsular cataract) - Both Eyes 4/29/2013    S/P hernia repair 12/19/2012    TIA (transient ischemic attack) 11/18/2014    Tinea pedis 7/24/2012    Tinea pedis is present on both feet.     Type 2 diabetes mellitus with diabetic polyneuropathy, with long-term current use of insulin 9/18/2015    Type 2 diabetes mellitus with renal manifestations, controlled 12/12/2013    Type 2 diabetes, controlled, with moderate nonproliferative diabetic retinopathy without macular edema 9/17/2015    Ulcer of left lower extremity, limited to breakdown of skin 7/8/2015    Unspecified cerebral artery occlusion with cerebral infarction 11/16/2014    Unspecified venous (peripheral) insufficiency     Ureteral stone with hydronephrosis 1/27/2016    UTI (lower urinary tract infection)     Vaginal infection     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     Past Surgical History:   Procedure Laterality Date    ABLATION Bilateral 6/23/2022    Procedure: Ablation;  Surgeon: Vahid Farfan MD;  Location: [facility] CATH LAB/EP;  Service: Cardiology;  Laterality: Bilateral;    ABLATION Right 11/17/2022    Procedure: Ablation;  Surgeon: Vahid Farfan MD;  Location: [facility] CATH LAB/EP;  Service: Cardiology;  Laterality: Right;    APPENDECTOMY      CATARACT EXTRACTION W/  INTRAOCULAR LENS IMPLANT Left 5/21/2013     CATARACT EXTRACTION W/  INTRAOCULAR LENS IMPLANT Right 2013    CHOLECYSTECTOMY      COLONOSCOPY  2005    normal    COLONOSCOPY N/A 2022    Procedure: COLONOSCOPY;  Surgeon: Kannan Mace MD;  Location: [facility];  Service: Endoscopy;  Laterality: N/A;    ESOPHAGOGASTRODUODENOSCOPY  2015    hiatal hernia, Schatzki ring    ESOPHAGOGASTRODUODENOSCOPY N/A 2022    Procedure: EGD (ESOPHAGOGASTRODUODENOSCOPY);  Surgeon: Kannan Mace MD;  Location: [facility];  Service: Endoscopy;  Laterality: N/A;    EYE SURGERY Bilateral     laser surgery both eyes    INTRALUMINAL GASTROINTESTINAL TRACT IMAGING VIA CAPSULE N/A 7/15/2022    Procedure: IMAGING PROCEDURE, GI TRACT, INTRALUMINAL, VIA CAPSULE;  Surgeon: Kannan Mace MD;  Location: [facility];  Service: Endoscopy;  Laterality: N/A;    NASAL SEPTUM SURGERY      SMALL BOWEL ENTEROSCOPY N/A 2022    Procedure: ENTEROSCOPY Upper SBE;  Surgeon: Salo Frank MD;  Location: [facility];  Service: Endoscopy;  Laterality: N/A;    SUBTOTAL COLECTOMY  2012    transverse colon, for incarcerated umbilical hernia, Dr. Kat Bower     Family History       Problem Relation (Age of Onset)    Cataracts Sister, Brother    Diabetes Sister    Heart disease Brother    Leukemia Mother          Tobacco Use    Smoking status: Former     Packs/day: 0.50     Years: 15.00     Pack years: 7.50     Types: Cigarettes     Quit date: 1982     Years since quittin.9    Smokeless tobacco: Former    Tobacco comments:     smoked one pack per week   Substance and Sexual Activity    Alcohol use: No    Drug use: No    Sexual activity: Not Currently     Review of Systems  General: negative for chills, fever or weight loss  Psychological: negative for mood changes or depression  Ophthalmic: negative for blurry vision, photophobia or eye pain  ENT: see HPI  Respiratory: no shortness of breath, or wheezing  Cardiovascular: no dyspnea on  exertion  Gastrointestinal: no abdominal pain, change in bowel habits, or black/ bloody stools  Musculoskeletal: see HPI, positive for immobility, extremity weakness and LE lymphedema  Neurological: no syncope or seizures  Dermatological: negative for pruritis, rash and jaundice  Hematologic/lymphatic: no new adenopathy    Objective:     Vital Signs (Most Recent):  Temp: 96.9 °F (36.1 °C) (05/27/23 0750)  Pulse: 83 (Remote tele review) (05/27/23 0910)  Resp: 19 (05/27/23 0745)  BP: (!) 105/54 (05/27/23 0745)  SpO2: (!) 94 % (05/27/23 0750) Vital Signs (24h Range):  Temp:  [89 °F (31.7 °C)-97 °F (36.1 °C)] 96.9 °F (36.1 °C)  Pulse:  [47-83] 83  Resp:  [15-20] 19  SpO2:  [94 %-100 %] 94 %  BP: (105-148)/(54-70) 105/54     Weight: 119.1 kg (262 lb 9.1 oz)  Body mass index is 41.12 kg/m².         Physical Exam     Constitutional:  Morbidly obese, lethargic, but able to answer questions.  Eyes: EOM I Bilaterally  Head/Face: Normocephalic.  Negative paranasal sinus pressure/tenderness.  Salivary glands WNL.  House Brackmann I Bilaterally.    Right Ear: External Auditory Canal with cerumen impaction, TM not visible.  Auricle WNL.  Left Ear: External Auditory Canal WNL,TM w/o masses/lesions/perforations. Auricle WNL.  Nose: No gross nasal septal deviation. Inferior Turbinates 2+ bilaterally.  Scant clear/whitish mucus bilaterally.  No purulent discharge or bleeding.  No septal perforation. No masses/lesions. External nasal skin without masses/lesions.  Oral Cavity: Gingiva/lips WNL.  FOM Soft, no masses palpated. Oral Tongue mobile. Hard Palate WNL.   Oropharynx: BOT WNL. No masses/lesions noted. Tonsillar fossa/pharyngeal wall without lesions. Posterior oropharynx WNL.  Soft palate without masses. Midline uvula.   Neck/Lymphatic: No LAD I-VI bilaterally.  No thyromegaly.  No masses noted on exam.  Neuro/Psychiatric:  Arousable but lethargic.  Normal mood and affect.    Respiratory: Normal respiratory effort, no stridor, no  retractions noted.       Significant Labs:  CBC:   Recent Labs   Lab 05/27/23  0440   WBC 1.81*   RBC 3.95*   HGB 9.7*   HCT 32.0*   PLT 89*   MCV 81*   MCH 24.6*   MCHC 30.3*     CMP:   Recent Labs   Lab 05/27/23  0440 05/27/23  0801   GLU 72  71 48*   CALCIUM 9.1  9.1 9.0   ALBUMIN 2.2*  --    PROT 6.2  --      144 144   K 5.7*  5.7* 5.9*   CO2 29  29 29   *  111* 111*   BUN 25*  26* 28*   CREATININE 2.0*  2.0* 2.0*   ALKPHOS 168*  --    ALT 31  --    AST 42*  --    BILITOT 0.3  --      TSH:   Recent Labs   Lab 05/26/23 2046   TSH 3.930       Significant Diagnostics:  I have reviewed and interpreted all pertinent imaging results/findings within the past 24 hours.  I agree with radiologist's interpretation of CT neck.  No significant abnormalities noted other than slight enlargement of right thyroid gland.  No distinct nodules noted.          See separate procedure note for flexible laryngoscopy.    Assessment/Plan:     No notes have been filed under this hospital service.  Service: Ent-Otolaryngology    78-year-old female with multiple medical comorbidities, deconditioning, and ongoing complaints of dysphagia/globus sensation.  Etiology likely multifaceted, including neuromuscular deconditioning, cranial nerve dysfunction, possible GI etiologies.  Upper airway patent, vocal folds mobile.    Reviewed SLP evaluation and recommendations.  Agree with plan of care for therapy as well as oral intake recommendations.  Also agree with the possibility of GI evaluation.  Patient had colonoscopy and upper endoscopy in 2022 which showed widely patent Schatzki ring, but if symptoms have worsened since that time, may benefit from outpatient GI evaluation.    Thank you for your consult.  No further interventions needed from ENT standpoint at this time.  Please contact us if patient needs further follow-up or there further questions.    Wendie Jacobs MD  Otorhinolaryngology-Head & Neck Surgery  [facility]  Telemetry

## 2023-05-27 NOTE — PROGRESS NOTES
Due to indication in patient's history, presentation or risk factors,  a fiber optic exam was performed to fully assess the upper airway and pharynx.    SEPARATE PROCEDURE NOTE:    ANESTHESIA:  Topical xylocaine with afrin    FINDINGS:  Presbylarynx    PROCEDURE:  After verbal consent was obtained and the nose and upper throat were anesthetized with topical xylocaine and Afrin, the flexible scope was passed through the patient's nasal cavity without difficulty.  The nasopharynx (adenoid pad) and eustachian tube orifices were first visualized and were found to be normal, without masses or irregularity.  Septum midline.  The posterior pharyngeal wall and base of tongue were then examined and no mass or irregular tissue was seen.    The scope was then advanced to the larynx, and the epiglottis, valleculae, and piriform sinuses were normal, without masses or mucosal irregularity.  The false vocal folds and true vocal folds were then examined and were found to have normal mobility (full abduction and adduction), though there was a small glottic gap and mild atrophy of the vocal cords, consistent with Presbylarynx. No masses or mucosal irregularity was seen in the larynx.    The arytenoids and the interarytenoid area were normal.     The patient tolerated the procedure well without complications.     Wendie Jacobs MD  [facility]torhin[facility]

## 2023-05-27 NOTE — CONSULTS
Nephrology Consult  H&P      Consult Requested By: Michael Harris MD  Reason for Consult: CKD4 Hyperkalemia      SUBJECTIVE:     History of Present Illness:  Sherin Ortiz is a 78 y.o.   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), Benign essential hypertension (11/14/2012), Cataract, Chronic kidney disease (CKD), stage III (moderate) (9/12/2013), Diabetic peripheral neuropathy associated with type 2 diabetes mellitus (11/14/2014), Gait disorder, Hyperlipidemia, Iritis - Both Eyes (6/10/2013), Kidney stone, Lymphedema, Morbid obesity with BMI of 40.0-44.9, adult (2/18/2015), Nephrolithiasis (4/20/2016), NS (nuclear sclerosis) (4/1/2013), Nuclear sclerosis - Both Eyes (4/29/2013), Preseptal cellulitis - Right Eye (4/29/2013), Proliferative diabetic retinopathy - Both Eyes (4/29/2013), Proliferative diabetic retinopathy, both eyes (4/1/2013), PSC (posterior subcapsular cataract) - Both Eyes (4/29/2013), S/P hernia repair (12/19/2012), TIA (transient ischemic attack) (11/18/2014), Tinea pedis (7/24/2012), Type 2 diabetes mellitus with diabetic polyneuropathy, with long-term current use of insulin (9/18/2015), Type 2 diabetes mellitus with renal manifestations, controlled (12/12/2013), Type 2 diabetes, controlled, with moderate nonproliferative diabetic retinopathy without macular edema (9/17/2015), Ulcer of left lower extremity, limited to breakdown of skin (7/8/2015), Unspecified cerebral artery occlusion with cerebral infarction (11/16/2014), Unspecified venous (peripheral) insufficiency, Ureteral stone with hydronephrosis (1/27/2016), UTI (lower urinary tract infection), Vaginal infection, and 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. The patient presented to the [facility] on 5/26/2023 with a primary complaint of sore throat difficulty swallowing, found to have Hyperkalemia   ?    Review of Systems   Constitutional:  Negative for chills and fever.    HENT:  Positive for sore throat. Negative for congestion.    Eyes:  Negative for blurred vision, double vision and photophobia.   Respiratory:  Negative for cough and shortness of breath.    Cardiovascular:  Negative for chest pain, palpitations and leg swelling.   Gastrointestinal:  Negative for abdominal pain, diarrhea, nausea and vomiting.   Genitourinary:  Negative for dysuria and urgency.   Musculoskeletal:  Positive for back pain. Negative for joint pain and myalgias.   Skin:  Negative for itching and rash.   Neurological:  Positive for weakness. Negative for dizziness, sensory change and headaches.   Endo/Heme/Allergies:  Negative for polydipsia. Does not bruise/bleed easily.   Psychiatric/Behavioral:  Negative for depression.       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    Benign essential hypertension 11/14/2012    Cataract     s/p phacoemulsification    Chronic kidney disease (CKD), stage III (moderate) 9/12/2013    Diabetic peripheral neuropathy associated with type 2 diabetes mellitus 11/14/2014    causing right hemiparesis    Gait disorder     Hyperlipidemia     Iritis - Both Eyes 6/10/2013    Kidney stone     Lymphedema     Morbid obesity with BMI of 40.0-44.9, adult 2/18/2015    Nephrolithiasis 4/20/2016    NS (nuclear sclerosis) 4/1/2013    Nuclear sclerosis - Both Eyes 4/29/2013    Preseptal cellulitis - Right Eye 4/29/2013    Proliferative diabetic retinopathy - Both Eyes 4/29/2013    Proliferative diabetic retinopathy, both eyes 4/1/2013    PSC (posterior subcapsular cataract) - Both Eyes 4/29/2013    S/P hernia repair 12/19/2012    TIA (transient ischemic attack) 11/18/2014    Tinea pedis 7/24/2012    Tinea pedis is present on both feet.     Type 2 diabetes mellitus with diabetic polyneuropathy, with long-term current use of insulin 9/18/2015    Type 2 diabetes mellitus with renal manifestations, controlled 12/12/2013    Type 2 diabetes,  controlled, with moderate nonproliferative diabetic retinopathy without macular edema 9/17/2015    Ulcer of left lower extremity, limited to breakdown of skin 7/8/2015    Unspecified cerebral artery occlusion with cerebral infarction 11/16/2014    Unspecified venous (peripheral) insufficiency     Ureteral stone with hydronephrosis 1/27/2016    UTI (lower urinary tract infection)     Vaginal infection     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     Past Surgical History:   Procedure Laterality Date    ABLATION Bilateral 6/23/2022    Procedure: Ablation;  Surgeon: Vahid Farfan MD;  Location: [facility] CATH LAB/EP;  Service: Cardiology;  Laterality: Bilateral;    ABLATION Right 11/17/2022    Procedure: Ablation;  Surgeon: Vahid Farfan MD;  Location: [facility] CATH LAB/EP;  Service: Cardiology;  Laterality: Right;    APPENDECTOMY      CATARACT EXTRACTION W/  INTRAOCULAR LENS IMPLANT Left 5/21/2013    CATARACT EXTRACTION W/  INTRAOCULAR LENS IMPLANT Right 6/4/2013    CHOLECYSTECTOMY      COLONOSCOPY  12/22/2005    normal    COLONOSCOPY N/A 7/14/2022    Procedure: COLONOSCOPY;  Surgeon: Kannan Mace MD;  Location: [facility];  Service: Endoscopy;  Laterality: N/A;    ESOPHAGOGASTRODUODENOSCOPY  12/21/2015    hiatal hernia, Schatzki ring    ESOPHAGOGASTRODUODENOSCOPY N/A 7/13/2022    Procedure: EGD (ESOPHAGOGASTRODUODENOSCOPY);  Surgeon: Kannan Mace MD;  Location: [facility];  Service: Endoscopy;  Laterality: N/A;    EYE SURGERY Bilateral 2008    laser surgery both eyes    INTRALUMINAL GASTROINTESTINAL TRACT IMAGING VIA CAPSULE N/A 7/15/2022    Procedure: IMAGING PROCEDURE, GI TRACT, INTRALUMINAL, VIA CAPSULE;  Surgeon: Kannan Mace MD;  Location: [facility];  Service: Endoscopy;  Laterality: N/A;    NASAL SEPTUM SURGERY      SMALL BOWEL ENTEROSCOPY N/A 7/18/2022    Procedure: ENTEROSCOPY Upper SBE;  Surgeon: Salo Frank MD;  Location: [facility];  Service: Endoscopy;  Laterality: N/A;    SUBTOTAL COLECTOMY   "2012    transverse colon, for incarcerated umbilical hernia, Dr. Kat Bower     Family History   Problem Relation Age of Onset    Diabetes Sister     Cataracts Sister     Heart disease Brother     Cataracts Brother     Leukemia Mother     Cancer Neg Hx     Amblyopia Neg Hx     Blindness Neg Hx     Glaucoma Neg Hx     Hypertension Neg Hx     Macular degeneration Neg Hx     Retinal detachment Neg Hx     Strabismus Neg Hx     Stroke Neg Hx     Thyroid disease Neg Hx     Kidney disease Neg Hx      Social History     Tobacco Use    Smoking status: Former     Packs/day: 0.50     Years: 15.00     Pack years: 7.50     Types: Cigarettes     Quit date: 1982     Years since quittin.9    Smokeless tobacco: Former    Tobacco comments:     smoked one pack per week   Substance Use Topics    Alcohol use: No    Drug use: No       Review of patient's allergies indicates:   Allergen Reactions    Penicillins Hives     Other reaction(s): Hives  Patient has received cefdinir, ceftriaxone, cefazolin and cefepime in the past with no documented reactions    Sulfa (sulfonamide antibiotics) Other (See Comments)     Shakes, pt states her doctor told her the shakes were possibly caused by an allergy to sulfa            OBJECTIVE:     Vital Signs (Most Recent)  Vitals:    23 0745 23 0750 23 0805 23 0910   BP: (!) 105/54      BP Location:       Patient Position: Lying      Pulse: 83 77  83   Resp: 19      Temp:  96.9 °F (36.1 °C)     TempSrc:  Axillary     SpO2: (!) 94% (!) 94%     Weight:   119.1 kg (262 lb 9.1 oz)    Height:   5' 7" (1.702 m)                  Medications:   aspirin  81 mg Oral Daily    atorvastatin  40 mg Oral QHS    insulin detemir U-100  8 Units Subcutaneous QHS    pantoprazole  40 mg Oral Daily    sodium zirconium cyclosilicate  10 g Oral TID           Physical Exam  Vitals and nursing note reviewed.   Constitutional:       General: She is not in acute distress.     Appearance: " She is ill-appearing. She is not diaphoretic.   HENT:      Head: Normocephalic and atraumatic.      Mouth/Throat:      Pharynx: No oropharyngeal exudate.   Eyes:      General: No scleral icterus.     Conjunctiva/sclera: Conjunctivae normal.      Pupils: Pupils are equal, round, and reactive to light.   Cardiovascular:      Rate and Rhythm: Normal rate and regular rhythm.      Heart sounds: Normal heart sounds. No murmur heard.  Pulmonary:      Effort: Pulmonary effort is normal. No respiratory distress.      Breath sounds: Normal breath sounds.   Abdominal:      General: Bowel sounds are normal. There is no distension.      Palpations: Abdomen is soft.      Tenderness: There is no abdominal tenderness.   Musculoskeletal:         General: Normal range of motion.      Cervical back: Normal range of motion and neck supple.   Skin:     General: Skin is warm and dry.      Findings: No erythema.   Neurological:      Mental Status: She is alert.      Cranial Nerves: No cranial nerve deficit.       Laboratory:  Recent Labs   Lab 05/22/23  0939 05/26/23  1656 05/27/23  0440   WBC 2.80* 1.86* 1.81*   HGB 11.2* 11.1* 9.7*   HCT 38.7 38.1 32.0*   * 82* 89*   MONO 5.7  0.2* 6.0 2.0*  CANCELED     Recent Labs   Lab 05/22/23  0939 05/26/23  1656 05/27/23  0038 05/27/23  0440 05/27/23  0801   *   < > 143 144  144 144   K 5.2*   < > 5.5* 5.7*  5.7* 5.9*      < > 111* 111*  111* 111*   CO2 26   < > 28 29  29 29   BUN 28*   < > 27* 25*  26* 28*   CREATININE 1.8*   < > 1.9* 2.0*  2.0* 2.0*   CALCIUM 9.9   < > 9.3 9.1  9.1 9.0   PHOS 3.8  --   --  4.1  --     < > = values in this interval not displayed.       Diagnostic Results:  X-Ray: Reviewed  US: Reviewed  Echo: Reviewed  ASSESSMENT/PLAN:     1. CKD4  DM/HTN   -- B/L cr 2.0   -- Now close to baseline but Has Hyperkalemia 5.9   -- Daily Renal Function Panel  -- Avoid Hypotension.  -- Renally dose all meds  -- Please avoid nephrotoxins, including NSAIDs,  aminoglycosides, IV contrast (unless absolutely necessary), gadolinium, fleets and other phosphorous-based laxatives. Caution with antibiotics.    Hyperkalemia 5.9  -- Check US kidney bladder to rule out obstruction   -- Lokelma TID for now cannot shift with Insulin BS low   -- avoid Acidosis Hyperchloremia change to Bicarb d5% at 75 hr   Need to rule out infection - Loculated left pleural fluid collection.  Correlate for empyema.     2. HTN (I10) -  controled   3. Anemia of chronic kidney disease       Recent Labs   Lab 05/22/23  0939 05/26/23  1656 05/27/23  0440   HGB 11.2* 11.1* 9.7*   HCT 38.7 38.1 32.0*   * 82* 89*       Iron   Lab Results   Component Value Date    IRON 44 04/16/2021    TIBC 318 04/16/2021    FERRITIN 149 04/16/2021       4. MBD (E88.9 M90.80) -  Recent Labs   Lab 05/27/23  0440 05/27/23  0801   CALCIUM 9.1  9.1 9.0   PHOS 4.1  --      Recent Labs   Lab 05/22/23  0939 05/27/23  0440   MG 2.0 1.9       Lab Results   Component Value Date    .7 (H) 12/22/2022    CALCIUM 9.0 05/27/2023    CAION 1.22 12/15/2012    PHOS 4.1 05/27/2023     Lab Results   Component Value Date    TFIUUAUX72DV 18 (L) 07/06/2017       Lab Results   Component Value Date    CO2 29 05/27/2023       5. Nutrition/Hypoalbuminemia (E88.09) -    Recent Labs   Lab 05/26/23  1656 05/27/23  0440   ALBUMIN 2.5* 2.2*     Nepro with meals TID.        Thank you for allowing me to participate in care of your patient  With any question please call   Cesar Timmons MD     [facility]  RAISA Oliver MD,   MD JAYME Car MD E. V. Harmon, NP  [address]   [place], [address] [phone]  After hours answering service: [phone]

## 2023-05-27 NOTE — HOSPITAL COURSE
78-year-old woman with complaints of neck pain, difficulty swallowing, and progressive decline over the last 4-6 weeks.  She initially presented to the hospital for dysphagia and was found to have large left loculated pleural effusion, pancytopenia, hyperglycemia and hypothermia concerning for underlying infective process. She was started on empiric antibiotics with improvement in her pancytopenia and mental status.  Her pleural effusion was evaluated by pulmonology, but appears to be simple effusion on ultrasound imaging at bedside.  Patient's hyperkalemia peaked and started to improve.  Her mental status started to improve after empiric antibiotics.  Patient still complains of globus sensation and dysphagia.  Currently planning to undergo barium swallow, esophagram, and if necessary will consult GI for EGD.

## 2023-05-27 NOTE — ASSESSMENT & PLAN NOTE
· BLE discoloration, edema, decreased pulses  · Small venous stasis ulcer noted on RLE   · Wound care consulted

## 2023-05-27 NOTE — SUBJECTIVE & OBJECTIVE
ASSESSMENT/PLAN     (E89.0) Postsurgical hypothyroidism  (primary encounter diagnosis)  Comment: Underreplaced  Plan: Dose increased today    (E78.2) Mixed hyperlipidemia  Comment: Statin intolerant  Plan: No treatment    (D50.0) Anemia due to blood loss, chronic  Comment: CBC pending  Plan: Lab followup    (M15.9) Osteoarthritis, generalized  Comment: Stable  Plan: Continue diclofenac    RTC 4 months and retest lab.    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    Benign essential hypertension 11/14/2012    Cataract     s/p phacoemulsification    Chronic kidney disease (CKD), stage III (moderate) 9/12/2013    Diabetic peripheral neuropathy associated with type 2 diabetes mellitus 11/14/2014    causing right hemiparesis    Gait disorder     Hyperlipidemia     Iritis - Both Eyes 6/10/2013    Kidney stone     Lymphedema     Morbid obesity with BMI of 40.0-44.9, adult 2/18/2015    Nephrolithiasis 4/20/2016    NS (nuclear sclerosis) 4/1/2013    Nuclear sclerosis - Both Eyes 4/29/2013    Preseptal cellulitis - Right Eye 4/29/2013    Proliferative diabetic retinopathy - Both Eyes 4/29/2013    Proliferative diabetic retinopathy, both eyes 4/1/2013    PSC (posterior subcapsular cataract) - Both Eyes 4/29/2013    S/P hernia repair 12/19/2012    TIA (transient ischemic attack) 11/18/2014    Tinea pedis 7/24/2012    Tinea pedis is present on both feet.     Type 2 diabetes mellitus with diabetic polyneuropathy, with long-term current use of insulin 9/18/2015    Type 2 diabetes mellitus with renal manifestations, controlled 12/12/2013    Type 2 diabetes, controlled, with moderate nonproliferative diabetic retinopathy without macular edema 9/17/2015    Ulcer of left lower extremity, limited to breakdown of skin 7/8/2015    Unspecified cerebral artery occlusion with cerebral infarction 11/16/2014    Unspecified venous (peripheral) insufficiency     Ureteral stone with hydronephrosis 1/27/2016    UTI (lower urinary tract infection)     Vaginal infection     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       Past Surgical History:   Procedure Laterality Date    ABLATION Bilateral 6/23/2022    Procedure: Ablation;  Surgeon: Vahid Farfan MD;  Location: [facility] CATH LAB/EP;  Service: Cardiology;  Laterality: Bilateral;    ABLATION Right 11/17/2022    Procedure: Ablation;  Surgeon: Vahid Farfan MD;   Location: [facility] CATH LAB/EP;  Service: Cardiology;  Laterality: Right;    APPENDECTOMY      CATARACT EXTRACTION W/  INTRAOCULAR LENS IMPLANT Left 5/21/2013    CATARACT EXTRACTION W/  INTRAOCULAR LENS IMPLANT Right 6/4/2013    CHOLECYSTECTOMY      COLONOSCOPY  12/22/2005    normal    COLONOSCOPY N/A 7/14/2022    Procedure: COLONOSCOPY;  Surgeon: Kannan Mace MD;  Location: [facility] ENDO;  Service: Endoscopy;  Laterality: N/A;    ESOPHAGOGASTRODUODENOSCOPY  12/21/2015    hiatal hernia, Schatzki ring    ESOPHAGOGASTRODUODENOSCOPY N/A 7/13/2022    Procedure: EGD (ESOPHAGOGASTRODUODENOSCOPY);  Surgeon: Kannan Mace MD;  Location: [facility] ENDO;  Service: Endoscopy;  Laterality: N/A;    EYE SURGERY Bilateral 2008    laser surgery both eyes    INTRALUMINAL GASTROINTESTINAL TRACT IMAGING VIA CAPSULE N/A 7/15/2022    Procedure: IMAGING PROCEDURE, GI TRACT, INTRALUMINAL, VIA CAPSULE;  Surgeon: Kannan Mace MD;  Location: [facility] ENDO;  Service: Endoscopy;  Laterality: N/A;    NASAL SEPTUM SURGERY      SMALL BOWEL ENTEROSCOPY N/A 7/18/2022    Procedure: ENTEROSCOPY Upper SBE;  Surgeon: Salo Frank MD;  Location: [facility] ENDO;  Service: Endoscopy;  Laterality: N/A;    SUBTOTAL COLECTOMY  12/13/2012    transverse colon, for incarcerated umbilical hernia, Dr. Kat Bower       Review of patient's allergies indicates:   Allergen Reactions    Penicillins Hives     Other reaction(s): Hives  Patient has received cefdinir, ceftriaxone, cefazolin and cefepime in the past with no documented reactions    Sulfa (sulfonamide antibiotics) Other (See Comments)     Eyad, pt states her doctor told her the shakes were possibly caused by an allergy to sulfa       No current facility-administered medications on file prior to encounter.     Current Outpatient Medications on File Prior to Encounter   Medication Sig    amLODIPine (NORVASC) 10 MG tablet Take 1 tablet (10 mg total) by mouth once daily.    aspirin 81 MG Chew Chew 1 tablet  (81 mg total) by mouth once daily.    atorvastatin (LIPITOR) 40 MG tablet Take 1 tablet (40 mg total) by mouth every evening.    blood sugar diagnostic (TRUE METRIX GLUCOSE TEST STRIP) Strp TEST BLOOD SUGAR TWICE DAILY    diclofenac sodium (VOLTAREN) 1 % Gel Apply 2 g topically once daily. Apply to L Thumb as needed    furosemide (LASIX) 40 MG tablet Take 1 tablet (40 mg total) by mouth daily as needed (For worsening shortness of breath, swelling, or 3lb weight gain on scale).    insulin glargine (LANTUS U-100 INSULIN) 100 unit/mL injection Inject 15 Units into the skin every evening.    lancets (TRUEPLUS LANCETS) 33 gauge Misc TEST TWO TIMES DAILY    lisinopriL (PRINIVIL,ZESTRIL) 5 MG tablet Take 1 tablet (5 mg total) by mouth once daily.    pantoprazole (PROTONIX) 40 MG tablet Take 1 tablet (40 mg total) by mouth once daily.    sodium bicarbonate 650 MG tablet Take 1 tablet (650 mg total) by mouth 2 (two) times daily.    [DISCONTINUED] blood glucose control, high (TRUE METRIX LEVEL 3) Soln Used to calibrate weekly     Family History       Problem Relation (Age of Onset)    Cataracts Sister, Brother    Diabetes Sister    Heart disease Brother    Leukemia Mother          Tobacco Use    Smoking status: Former     Packs/day: 0.50     Years: 15.00     Pack years: 7.50     Types: Cigarettes     Quit date: 1982     Years since quittin.9    Smokeless tobacco: Former    Tobacco comments:     smoked one pack per week   Substance and Sexual Activity    Alcohol use: No    Drug use: No    Sexual activity: Not Currently     Review of Systems   Constitutional:  Negative for chills and fever.   HENT:  Negative for drooling.         Foreign body sensation in throat   Respiratory:  Positive for cough and shortness of breath. Negative for choking, wheezing and stridor.    Cardiovascular:  Negative for chest pain.   Gastrointestinal:  Negative for abdominal pain, diarrhea, nausea and vomiting.   Genitourinary:  Negative for  decreased urine volume.   Musculoskeletal:  Positive for gait problem (Wheelchair-bound). Negative for neck pain and neck stiffness.   Skin:  Positive for wound (Venous stasis discoloration/wound).   Neurological:  Negative for dizziness, speech difficulty, light-headedness and headaches.   Psychiatric/Behavioral:  Negative for agitation and confusion. The patient is not nervous/anxious.    Objective:     Vital Signs (Most Recent):  Temp: (!) 90.2 °F (32.3 °C) (05/26/23 2118)  Pulse: (!) 49 (05/26/23 1904)  Resp: 18 (05/26/23 1904)  BP: (!) 148/70 (05/26/23 1904)  SpO2: 97 % (05/26/23 1904) Vital Signs (24h Range):  Temp:  [89 °F (31.7 °C)-97 °F (36.1 °C)] 90.2 °F (32.3 °C)  Pulse:  [48-51] 49  Resp:  [17-20] 18  SpO2:  [97 %-100 %] 97 %  BP: (132-148)/(61-70) 148/70     Weight: 117.9 kg (260 lb)  Body mass index is 40.72 kg/m².     Physical Exam  Vitals and nursing note reviewed.   Constitutional:       General: She is not in acute distress.     Appearance: She is obese. She is ill-appearing. She is not toxic-appearing.   HENT:      Head: Normocephalic and atraumatic.   Eyes:      Pupils: Pupils are equal, round, and reactive to light.   Neck:      Thyroid: Thyromegaly present.      Trachea: No tracheal deviation.   Cardiovascular:      Rate and Rhythm: Regular rhythm. Bradycardia present.      Pulses:           Dorsalis pedis pulses are 1+ on the right side and 1+ on the left side.      Heart sounds: Normal heart sounds.   Pulmonary:      Effort: Pulmonary effort is normal. No respiratory distress.      Breath sounds: Decreased breath sounds present. No wheezing.   Abdominal:      General: Bowel sounds are normal. There is no distension.      Palpations: Abdomen is soft.      Tenderness: There is no abdominal tenderness. There is no rebound.   Musculoskeletal:      Cervical back: Normal range of motion.      Right lower leg: Tenderness present. 2+ Edema present.      Left lower leg: Tenderness present. 2+ Edema  present.   Lymphadenopathy:      Cervical: No cervical adenopathy.   Skin:     General: Skin is warm.      Findings: Wound present.      Comments: Discoloration BLE; small abrasion/wound w/ pink base RLE - no drainage or warmth noted   Neurological:      Mental Status: She is alert and oriented to person, place, and time.   Psychiatric:         Mood and Affect: Mood normal.         Behavior: Behavior normal.         Thought Content: Thought content normal.         Judgment: Judgment normal.            CRANIAL NERVES     CN III, IV, VI   Pupils are equal, round, and reactive to light.     Significant Labs: All pertinent labs within the past 24 hours have been reviewed.    Significant Imaging: I have reviewed all pertinent imaging results/findings within the past 24 hours.

## 2023-05-27 NOTE — CONSULTS
HEMATOLOGY CONSULT NOTE    Best Contact Phone Number(s): [phone] (home)        Reason for consultation: Leukopenia    History has been obtained by chart review and discussion with the patient.    HPI:   Sherin Ortiz is a 78 y.o. woman with with multiple comorbidities including HTN, DMT2, HLD, obesity, TIA, CKD stage 4, and PVD.  She was admitted from the ED after presenting from her SNF with progressive malaise, dyspnea, and dysphagia. On presentation she was found to have hypothermia with T 96.9, pulmonary edema, loculated left pleural effusions, hypoglycemia, and pancytopenia including WBC 1.81K; HGB 9.7; and PLT 89K. Hematology consulted for cytopenia.     Patient is somnelent to obtunded on exam and she is not able to provide reliable ROS. Patient's daughter reports she was able to call her yesterday multiple times complatining of shortness of breath and difficulty swallowing. She was also reported to have ongoing cough prodcutive of thick white sputum and associated shortness of breath. Patient recent medical course notable for admission 4/28/23 - 5/5/23 for CHF exacrbation. She was started on amlodipine that admission. No other signficant medication changes. She has had mild intermittent leukopenia dating back to at least 2012, although this is her lowest value. She also has chronic anemia dating back to at least 2005 with associated CKD. Thrombocytopenia is relatively new. Prior HIV and HCV testing negative 03/2023. TSH was normal 05/2023.       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    Benign essential hypertension 11/14/2012    Cataract     s/p phacoemulsification    Chronic kidney disease (CKD), stage III (moderate) 9/12/2013    Diabetic peripheral neuropathy associated with type 2 diabetes mellitus 11/14/2014    causing right hemiparesis    Gait disorder     Hyperlipidemia     Iritis - Both Eyes 6/10/2013    Kidney stone     Lymphedema      Morbid obesity with BMI of 40.0-44.9, adult 2/18/2015    Nephrolithiasis 4/20/2016    NS (nuclear sclerosis) 4/1/2013    Nuclear sclerosis - Both Eyes 4/29/2013    Preseptal cellulitis - Right Eye 4/29/2013    Proliferative diabetic retinopathy - Both Eyes 4/29/2013    Proliferative diabetic retinopathy, both eyes 4/1/2013    PSC (posterior subcapsular cataract) - Both Eyes 4/29/2013    S/P hernia repair 12/19/2012    TIA (transient ischemic attack) 11/18/2014    Tinea pedis 7/24/2012    Tinea pedis is present on both feet.     Type 2 diabetes mellitus with diabetic polyneuropathy, with long-term current use of insulin 9/18/2015    Type 2 diabetes mellitus with renal manifestations, controlled 12/12/2013    Type 2 diabetes, controlled, with moderate nonproliferative diabetic retinopathy without macular edema 9/17/2015    Ulcer of left lower extremity, limited to breakdown of skin 7/8/2015    Unspecified cerebral artery occlusion with cerebral infarction 11/16/2014    Unspecified venous (peripheral) insufficiency     Ureteral stone with hydronephrosis 1/27/2016    UTI (lower urinary tract infection)     Vaginal infection     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        Past Surgical History:   Procedure Laterality Date    ABLATION Bilateral 6/23/2022    Procedure: Ablation;  Surgeon: Vahid Farfan MD;  Location: [facility] CATH LAB/EP;  Service: Cardiology;  Laterality: Bilateral;    ABLATION Right 11/17/2022    Procedure: Ablation;  Surgeon: Vahid Farfan MD;  Location: [facility] CATH LAB/EP;  Service: Cardiology;  Laterality: Right;    APPENDECTOMY      CATARACT EXTRACTION W/  INTRAOCULAR LENS IMPLANT Left 5/21/2013    CATARACT EXTRACTION W/  INTRAOCULAR LENS IMPLANT Right 6/4/2013    CHOLECYSTECTOMY      COLONOSCOPY  12/22/2005    normal    COLONOSCOPY N/A 7/14/2022    Procedure: COLONOSCOPY;  Surgeon: Kannan Mace MD;  Location: [facility] ENDO;  Service: Endoscopy;  Laterality: N/A;    ESOPHAGOGASTRODUODENOSCOPY   2015    hiatal hernia, Schatzki ring    ESOPHAGOGASTRODUODENOSCOPY N/A 2022    Procedure: EGD (ESOPHAGOGASTRODUODENOSCOPY);  Surgeon: Kannan Mace MD;  Location: [facility] ENDO;  Service: Endoscopy;  Laterality: N/A;    EYE SURGERY Bilateral     laser surgery both eyes    INTRALUMINAL GASTROINTESTINAL TRACT IMAGING VIA CAPSULE N/A 7/15/2022    Procedure: IMAGING PROCEDURE, GI TRACT, INTRALUMINAL, VIA CAPSULE;  Surgeon: Kannan Mace MD;  Location: [facility] ENDO;  Service: Endoscopy;  Laterality: N/A;    NASAL SEPTUM SURGERY      SMALL BOWEL ENTEROSCOPY N/A 2022    Procedure: ENTEROSCOPY Upper SBE;  Surgeon: Salo Frank MD;  Location: [facility] ENDO;  Service: Endoscopy;  Laterality: N/A;    SUBTOTAL COLECTOMY  2012    transverse colon, for incarcerated umbilical hernia, Dr. Kat Bower        Family History   Problem Relation Age of Onset    Diabetes Sister     Cataracts Sister     Heart disease Brother     Cataracts Brother     Leukemia Mother     Cancer Neg Hx     Amblyopia Neg Hx     Blindness Neg Hx     Glaucoma Neg Hx     Hypertension Neg Hx     Macular degeneration Neg Hx     Retinal detachment Neg Hx     Strabismus Neg Hx     Stroke Neg Hx     Thyroid disease Neg Hx     Kidney disease Neg Hx         Social History     Tobacco Use    Smoking status: Former     Packs/day: 0.50     Years: 15.00     Pack years: 7.50     Types: Cigarettes     Quit date: 1982     Years since quittin.9    Smokeless tobacco: Former    Tobacco comments:     smoked one pack per week   Substance Use Topics    Alcohol use: No        I have reviewed and updated the patient's past medical, surgical, family and social histories.    Review of patient's allergies indicates:   Allergen Reactions    Penicillins Hives     Other reaction(s): Hives  Patient has received cefdinir, ceftriaxone, cefazolin and cefepime in the past with no documented reactions    Sulfa (sulfonamide antibiotics) Other (See  "Comments)     Shajosie, pt states her doctor told her the shakes were possibly caused by an allergy to sulfa        Current Facility-Administered Medications   Medication Dose Route Frequency Provider Last Rate Last Admin    acetaminophen tablet 650 mg  650 mg Oral Q4H PRN Sherin Mckeon, NP        aspirin chewable tablet 81 mg  81 mg Oral Daily Sherin Mckeon, NP        atorvastatin tablet 40 mg  40 mg Oral QHS Sherin Mckeon, NP   40 mg at 05/26/23 2123    dextrose 10% bolus 125 mL 125 mL  12.5 g Intravenous PRN Sherin Mckeon, NP        dextrose 10% bolus 250 mL 250 mL  25 g Intravenous PRN Sherin Mckeon, NP        dextrose 40 % gel 15,000 mg  15 g Oral PRN Sherin Mckeon, NP        dextrose 40 % gel 30,000 mg  30 g Oral PRN Sherin Mckeon, NP        glucagon (human recombinant) injection 1 mg  1 mg Intramuscular PRN Sherin Mckeon, NP        insulin aspart U-100 pen 0-5 Units  0-5 Units Subcutaneous QID (AC + HS) PRN Sherin Mckeon, NP        insulin detemir U-100 (Levemir) pen 8 Units  8 Units Subcutaneous QHS Sherin Mckeon, NP   8 Units at 05/26/23 2123    melatonin tablet 6 mg  6 mg Oral Nightly PRN Sherin Mckeon, NP        naloxone 0.4 mg/mL injection 0.02 mg  0.02 mg Intravenous PRN Sherin Mckeon, NP        ondansetron injection 4 mg  4 mg Intravenous Q8H PRN Sherin Mckeon, NP        pantoprazole EC tablet 40 mg  40 mg Oral Daily Sherin Mckeon, NP        sodium chloride 0.9% flush 3 mL  3 mL Intravenous Q12H PRN Sherin Mckeon, NP        sodium zirconium cyclosilicate packet 10 g  10 g Oral TID Michael Harris MD            Physical Exam:   BP (!) 108/52 (BP Location: Left arm, Patient Position: Lying)   Pulse 99   Temp 99.1 °F (37.3 °C) (Oral)   Resp 17   Ht 5' 7" (1.702 m)   Wt 119.1 kg (262 lb 9.1 oz)   LMP  (LMP Unknown) Comment: patient refused to weigh/wounds to legs and toes   SpO2 (!) 92%   BMI 41.12 kg/m²      ECOG Performance status: 4            Physical Exam  Constitutional:       Appearance: She is obese. She " is ill-appearing.   HENT:      Nose:      Comments: NGT in left nare  Neurological:      Comments: Patient obtunded. Briefly awakens to daughter and able to answer '[facility]' when asked where she is. Otherwise unable to participate.         Labs:   Recent Results (from the past 48 hour(s))   CBC auto differential    Collection Time: 05/26/23  4:56 PM   Result Value Ref Range    WBC 1.86 (LL) 3.90 - 12.70 K/uL    RBC 4.61 4.00 - 5.40 M/uL    Hemoglobin 11.1 (L) 12.0 - 16.0 g/dL    Hematocrit 38.1 37.0 - 48.5 %    MCV 83 82 - 98 fL    MCH 24.1 (L) 27.0 - 31.0 pg    MCHC 29.1 (L) 32.0 - 36.0 g/dL    RDW 18.1 (H) 11.5 - 14.5 %    Platelets 82 (L) 150 - 450 K/uL    MPV 9.5 9.2 - 12.9 fL    Immature Granulocytes Test Not Performed 0.0 - 0.5 %    Immature Grans (Abs) Test Not Performed 0.00 - 0.04 K/uL    nRBC 0 0 /100 WBC    Gran % 79.0 (H) 38.0 - 73.0 %    Lymph % 15.0 (L) 18.0 - 48.0 %    Mono % 6.0 4.0 - 15.0 %    Eosinophil % 0.0 0.0 - 8.0 %    Basophil % 0.0 0.0 - 1.9 %    Platelet Estimate Decreased (A)     Aniso Slight     Poik Slight     Poly CANCELED     Kendra Cells Occasional     Differential Method Manual    Comprehensive metabolic panel    Collection Time: 05/26/23  4:56 PM   Result Value Ref Range    Sodium 143 136 - 145 mmol/L    Potassium 5.3 (H) 3.5 - 5.1 mmol/L    Chloride 110 95 - 110 mmol/L    CO2 27 23 - 29 mmol/L    Glucose 91 70 - 110 mg/dL    BUN 25 (H) 8 - 23 mg/dL    Creatinine 1.9 (H) 0.5 - 1.4 mg/dL    Calcium 9.6 8.7 - 10.5 mg/dL    Total Protein 7.0 6.0 - 8.4 g/dL    Albumin 2.5 (L) 3.5 - 5.2 g/dL    Total Bilirubin 0.4 0.1 - 1.0 mg/dL    Alkaline Phosphatase 197 (H) 55 - 135 U/L    AST 42 (H) 10 - 40 U/L    ALT 33 10 - 44 U/L    Anion Gap 6 (L) 8 - 16 mmol/L    eGFR 26 (A) >60 mL/min/1.73 m^2   Brain natriuretic peptide    Collection Time: 05/26/23  4:56 PM   Result Value Ref Range     (H) 0 - 99 pg/mL   Blood culture x two cultures. Draw prior to antibiotics.    Collection Time:  05/26/23  8:40 PM    Specimen: Peripheral, Forearm, Right; Blood   Result Value Ref Range    Blood Culture, Routine No Growth to date    Lactic acid, plasma    Collection Time: 05/26/23  8:46 PM   Result Value Ref Range    Lactate (Lactic Acid) 0.6 0.5 - 2.2 mmol/L   Procalcitonin    Collection Time: 05/26/23  8:46 PM   Result Value Ref Range    Procalcitonin 0.15 <0.25 ng/mL   TSH    Collection Time: 05/26/23  8:46 PM   Result Value Ref Range    TSH 3.930 0.400 - 4.000 uIU/mL   T4, Free    Collection Time: 05/26/23  8:46 PM   Result Value Ref Range    Free T4 1.08 0.71 - 1.51 ng/dL   T3    Collection Time: 05/26/23  8:46 PM   Result Value Ref Range    T3, Total 56 (L) 60 - 180 ng/dL   Blood culture x two cultures. Draw prior to antibiotics.    Collection Time: 05/26/23  8:57 PM    Specimen: Peripheral, Forearm, Right; Blood   Result Value Ref Range    Blood Culture, Routine No Growth to date    POCT glucose    Collection Time: 05/26/23  9:10 PM   Result Value Ref Range    POCT Glucose 103 70 - 110 mg/dL   Basic metabolic panel    Collection Time: 05/27/23 12:38 AM   Result Value Ref Range    Sodium 143 136 - 145 mmol/L    Potassium 5.5 (H) 3.5 - 5.1 mmol/L    Chloride 111 (H) 95 - 110 mmol/L    CO2 28 23 - 29 mmol/L    Glucose 99 70 - 110 mg/dL    BUN 27 (H) 8 - 23 mg/dL    Creatinine 1.9 (H) 0.5 - 1.4 mg/dL    Calcium 9.3 8.7 - 10.5 mg/dL    Anion Gap 4 (L) 8 - 16 mmol/L    eGFR 26 (A) >60 mL/min/1.73 m^2   POCT glucose    Collection Time: 05/27/23  4:36 AM   Result Value Ref Range    POCT Glucose 76 70 - 110 mg/dL   Comprehensive Metabolic Panel (CMP)    Collection Time: 05/27/23  4:40 AM   Result Value Ref Range    Sodium 144 136 - 145 mmol/L    Potassium 5.7 (H) 3.5 - 5.1 mmol/L    Chloride 111 (H) 95 - 110 mmol/L    CO2 29 23 - 29 mmol/L    Glucose 72 70 - 110 mg/dL    BUN 25 (H) 8 - 23 mg/dL    Creatinine 2.0 (H) 0.5 - 1.4 mg/dL    Calcium 9.1 8.7 - 10.5 mg/dL    Total Protein 6.2 6.0 - 8.4 g/dL    Albumin  2.2 (L) 3.5 - 5.2 g/dL    Total Bilirubin 0.3 0.1 - 1.0 mg/dL    Alkaline Phosphatase 168 (H) 55 - 135 U/L    AST 42 (H) 10 - 40 U/L    ALT 31 10 - 44 U/L    Anion Gap 4 (L) 8 - 16 mmol/L    eGFR 25 (A) >60 mL/min/1.73 m^2   Magnesium    Collection Time: 05/27/23  4:40 AM   Result Value Ref Range    Magnesium 1.9 1.6 - 2.6 mg/dL   Phosphorus    Collection Time: 05/27/23  4:40 AM   Result Value Ref Range    Phosphorus 4.1 2.7 - 4.5 mg/dL   CBC with Automated Differential    Collection Time: 05/27/23  4:40 AM   Result Value Ref Range    WBC 1.81 (LL) 3.90 - 12.70 K/uL    RBC 3.95 (L) 4.00 - 5.40 M/uL    Hemoglobin 9.7 (L) 12.0 - 16.0 g/dL    Hematocrit 32.0 (L) 37.0 - 48.5 %    MCV 81 (L) 82 - 98 fL    MCH 24.6 (L) 27.0 - 31.0 pg    MCHC 30.3 (L) 32.0 - 36.0 g/dL    RDW 17.7 (H) 11.5 - 14.5 %    Platelets 89 (L) 150 - 450 K/uL    MPV 10.4 9.2 - 12.9 fL    Immature Granulocytes CANCELED 0.0 - 0.5 %    Immature Grans (Abs) CANCELED 0.00 - 0.04 K/uL    Lymph # CANCELED 1.0 - 4.8 K/uL    Mono # CANCELED 0.3 - 1.0 K/uL    Eos # CANCELED 0.0 - 0.5 K/uL    Baso # CANCELED 0.00 - 0.20 K/uL    nRBC 0 0 /100 WBC    Gran % 77.0 (H) 38.0 - 73.0 %    Lymph % 21.0 18.0 - 48.0 %    Mono % 2.0 (L) 4.0 - 15.0 %    Eosinophil % 0.0 0.0 - 8.0 %    Basophil % 0.0 0.0 - 1.9 %    Platelet Estimate Decreased (A)     Aniso Slight     Poik Slight     Hypo Occasional     Ovalocytes Occasional     Differential Method Automated    Basic metabolic panel    Collection Time: 05/27/23  4:40 AM   Result Value Ref Range    Sodium 144 136 - 145 mmol/L    Potassium 5.7 (H) 3.5 - 5.1 mmol/L    Chloride 111 (H) 95 - 110 mmol/L    CO2 29 23 - 29 mmol/L    Glucose 71 70 - 110 mg/dL    BUN 26 (H) 8 - 23 mg/dL    Creatinine 2.0 (H) 0.5 - 1.4 mg/dL    Calcium 9.1 8.7 - 10.5 mg/dL    Anion Gap 4 (L) 8 - 16 mmol/L    eGFR 25 (A) >60 mL/min/1.73 m^2   Basic metabolic panel    Collection Time: 05/27/23  8:01 AM   Result Value Ref Range    Sodium 144 136 - 145  mmol/L    Potassium 5.9 (H) 3.5 - 5.1 mmol/L    Chloride 111 (H) 95 - 110 mmol/L    CO2 29 23 - 29 mmol/L    Glucose 48 (LL) 70 - 110 mg/dL    BUN 28 (H) 8 - 23 mg/dL    Creatinine 2.0 (H) 0.5 - 1.4 mg/dL    Calcium 9.0 8.7 - 10.5 mg/dL    Anion Gap 4 (L) 8 - 16 mmol/L    eGFR 25 (A) >60 mL/min/1.73 m^2   POCT glucose    Collection Time: 05/27/23  9:41 AM   Result Value Ref Range    POCT Glucose 82 70 - 110 mg/dL   POCT glucose    Collection Time: 05/27/23 11:02 AM   Result Value Ref Range    POCT Glucose 73 70 - 110 mg/dL          Imaging:    Imaging Results              CT Soft Tissue Neck WO Contrast (Final result)  Result time 05/26/23 18:28:56   Procedure changed from CT Soft Tissue Neck With Contrast     Final result by Damien Rodriguez MD (05/26/23 18:28:56)                   Impression:      Enlargement of the right lobe of the thyroid gland.  Correlate for goiter.    Loculated left pleural fluid collection.  Correlate for empyema.    No neck mass or adenopathy.    Rotator cuff disease with suprascapular fluid on the right and subacromial bursal fluid bilaterally.      Electronically signed by: Damien Rodriguez  Date:    05/26/2023  Time:    18:28               Narrative:    EXAMINATION:  CT SOFT TISSUE NECK WITHOUT CONTRAST    CLINICAL HISTORY:  Lymphadenopathy, neck;Cranial neuropathy (CN 9);    TECHNIQUE:  Low dose axial images, sagittal and coronal reformations were performed from the skull base to the level of the clavicles.  Contrast was not administered.    COMPARISON:  None    FINDINGS:  There is no mass or adenopathy.  Prevertebral soft tissues appear normal.  Vascular calcifications in the aortic arch and in the common carotid artery on the right and left noted.  Floor of the mouth and base of the tongue, muscles of mastication and  space, parotid gland and parotid space, prevertebral space appear normal.  There is enlargement of the thyroid gland right greater than left with  heterogeneity.  Node discrete nodule.    Small left pleural effusion which appears slightly loculated.  Otherwise the lung apices clear with no nodule or consolidation.    The skull base appears intact.  Glossopharyngeal foramen appears intact with no erosion or mass.  Cervical spondylosis is noted.  Severe rotator cuff pathology with suprascapular notch fluid on the right and subacromial bursal fluid left.                                       X-Ray Chest AP Portable (Final result)  Result time 05/26/23 17:25:59      Final result by Dewey Barfield MD (05/26/23 17:25:59)                   Impression:      1. Pulmonary findings suggest edema and pleural effusions.  No convincing large focal consolidation however developing left lower lung zone consolidation not excluded.      Electronically signed by: Dewey Barfield MD  Date:    05/26/2023  Time:    17:25               Narrative:    EXAMINATION:  XR CHEST AP PORTABLE    CLINICAL HISTORY:  Dysphagia, unspecified    TECHNIQUE:  Single frontal view of the chest was performed.    COMPARISON:  04/28/2023    FINDINGS:  The cardiomediastinal silhouette is prominent, similar to the previous exam noting calcification of the aorta..  There is obscuration of the bilateral costophrenic angles, left greater than right suggesting effusions..  The trachea is midline.  The lungs are symmetrically expanded bilaterally with coarse interstitial attenuation bilaterally suggesting edema.  Developing left lower lung zone consolidation not excluded..  There is no pneumothorax.  The osseous structures are remarkable for degenerative changes and osteopenia..                                       I have personally reviewed the above imaging    Path:   Reviewed pathology as documented in oncology history      Assessment and Plan:     Presentation appears most consistent with a secondary pantcytopenia given her multiple acute comormidities. Most concerning would be sepsis given  hypothermia, leukopenia, and left loculated effusion. Please send peripheral blood smear for pathologic review. Can also check basic nutrition workup including B12, folate, and copper along with LDH, PT/PTT, and reticulocyte count to round out workup. No current plan for bone marrow biopsy, but if cytopenias persist despite above, may reasonably consider primary hematologic process as contributing to presentation.    - Recommend ongoing workup of possible infection and left loculated effusion per primary team  - Please obtain peripheral smear for pathology review  - Check B12 / MMA; folate; copper; LDH; PT/PTT, and iron studies  - Would defer bone marrow biopsy at present  - If cytopenias persist despite stabilization of her acute presentation and above workup, could consider further invasive diagnostic testing for a primary hematologic process      Thank you for this interesting consult. We will  continue to follow    The above information has been reviewed with the patient and all questions have been answered to their apparent satisfaction.      Josue Whitaker MD  Hematology/Oncology  [facility]

## 2023-05-28 LAB
ALBUMIN SERPL BCP-MCNC: 2.2 G/DL (ref 3.5–5.2)
ALP SERPL-CCNC: 175 U/L (ref 55–135)
ALT SERPL W/O P-5'-P-CCNC: 30 U/L (ref 10–44)
ANION GAP SERPL CALC-SCNC: 10 MMOL/L (ref 8–16)
ANION GAP SERPL CALC-SCNC: 12 MMOL/L (ref 8–16)
ANION GAP SERPL CALC-SCNC: 12 MMOL/L (ref 8–16)
ANION GAP SERPL CALC-SCNC: 9 MMOL/L (ref 8–16)
APTT PPP: 28.5 SEC (ref 21–32)
AST SERPL-CCNC: 53 U/L (ref 10–40)
BASOPHILS # BLD AUTO: 0 K/UL (ref 0–0.2)
BASOPHILS NFR BLD: 0 % (ref 0–1.9)
BILIRUB SERPL-MCNC: 0.4 MG/DL (ref 0.1–1)
BUN SERPL-MCNC: 29 MG/DL (ref 8–23)
BUN SERPL-MCNC: 30 MG/DL (ref 8–23)
CALCIUM SERPL-MCNC: 8.6 MG/DL (ref 8.7–10.5)
CALCIUM SERPL-MCNC: 8.7 MG/DL (ref 8.7–10.5)
CALCIUM SERPL-MCNC: 8.9 MG/DL (ref 8.7–10.5)
CALCIUM SERPL-MCNC: 8.9 MG/DL (ref 8.7–10.5)
CALCIUM SERPL-MCNC: 9 MG/DL (ref 8.7–10.5)
CHLORIDE SERPL-SCNC: 104 MMOL/L (ref 95–110)
CHLORIDE SERPL-SCNC: 104 MMOL/L (ref 95–110)
CHLORIDE SERPL-SCNC: 105 MMOL/L (ref 95–110)
CHLORIDE SERPL-SCNC: 106 MMOL/L (ref 95–110)
CHLORIDE SERPL-SCNC: 106 MMOL/L (ref 95–110)
CO2 SERPL-SCNC: 24 MMOL/L (ref 23–29)
CO2 SERPL-SCNC: 26 MMOL/L (ref 23–29)
CO2 SERPL-SCNC: 27 MMOL/L (ref 23–29)
CO2 SERPL-SCNC: 29 MMOL/L (ref 23–29)
CREAT SERPL-MCNC: 2.8 MG/DL (ref 0.5–1.4)
CREAT SERPL-MCNC: 2.8 MG/DL (ref 0.5–1.4)
CREAT SERPL-MCNC: 2.9 MG/DL (ref 0.5–1.4)
CRP SERPL-MCNC: 15.3 MG/L (ref 0–8.2)
DIFFERENTIAL METHOD: ABNORMAL
EOSINOPHIL # BLD AUTO: 0 K/UL (ref 0–0.5)
EOSINOPHIL NFR BLD: 0.2 % (ref 0–8)
ERYTHROCYTE [DISTWIDTH] IN BLOOD BY AUTOMATED COUNT: 18.6 % (ref 11.5–14.5)
EST. GFR  (NO RACE VARIABLE): 16 ML/MIN/1.73 M^2
EST. GFR  (NO RACE VARIABLE): 17 ML/MIN/1.73 M^2
EST. GFR  (NO RACE VARIABLE): 17 ML/MIN/1.73 M^2
FERRITIN SERPL-MCNC: 119 NG/ML (ref 20–300)
FOLATE SERPL-MCNC: 3 NG/ML (ref 4–24)
GLUCOSE SERPL-MCNC: 198 MG/DL (ref 70–110)
GLUCOSE SERPL-MCNC: 203 MG/DL (ref 70–110)
GLUCOSE SERPL-MCNC: 210 MG/DL (ref 70–110)
GLUCOSE SERPL-MCNC: 217 MG/DL (ref 70–110)
GLUCOSE SERPL-MCNC: 221 MG/DL (ref 70–110)
GLUCOSE SERPL-MCNC: 223 MG/DL (ref 70–110)
GLUCOSE SERPL-MCNC: 224 MG/DL (ref 70–110)
HCT VFR BLD AUTO: 33 % (ref 37–48.5)
HGB BLD-MCNC: 9.8 G/DL (ref 12–16)
IMM GRANULOCYTES # BLD AUTO: 0.01 K/UL (ref 0–0.04)
IMM GRANULOCYTES NFR BLD AUTO: 0.2 % (ref 0–0.5)
INR PPP: 1.1 (ref 0.8–1.2)
IRON SERPL-MCNC: 81 UG/DL (ref 30–160)
LDH SERPL L TO P-CCNC: 216 U/L (ref 110–260)
LYMPHOCYTES # BLD AUTO: 0.9 K/UL (ref 1–4.8)
LYMPHOCYTES NFR BLD: 21 % (ref 18–48)
MAGNESIUM SERPL-MCNC: 1.8 MG/DL (ref 1.6–2.6)
MCH RBC QN AUTO: 24.4 PG (ref 27–31)
MCHC RBC AUTO-ENTMCNC: 29.7 G/DL (ref 32–36)
MCV RBC AUTO: 82 FL (ref 82–98)
MONOCYTES # BLD AUTO: 0.4 K/UL (ref 0.3–1)
MONOCYTES NFR BLD: 9.2 % (ref 4–15)
NEUTROPHILS # BLD AUTO: 2.9 K/UL (ref 1.8–7.7)
NEUTROPHILS NFR BLD: 69.4 % (ref 38–73)
NRBC BLD-RTO: 1 /100 WBC
PHOSPHATE SERPL-MCNC: 4.2 MG/DL (ref 2.7–4.5)
PLATELET # BLD AUTO: 103 K/UL (ref 150–450)
PMV BLD AUTO: 10.6 FL (ref 9.2–12.9)
POCT GLUCOSE: 177 MG/DL (ref 70–110)
POCT GLUCOSE: 196 MG/DL (ref 70–110)
POCT GLUCOSE: 204 MG/DL (ref 70–110)
POCT GLUCOSE: 242 MG/DL (ref 70–110)
POTASSIUM SERPL-SCNC: 3.8 MMOL/L (ref 3.5–5.1)
POTASSIUM SERPL-SCNC: 4.1 MMOL/L (ref 3.5–5.1)
POTASSIUM SERPL-SCNC: 4.1 MMOL/L (ref 3.5–5.1)
POTASSIUM SERPL-SCNC: 4.5 MMOL/L (ref 3.5–5.1)
POTASSIUM SERPL-SCNC: 4.8 MMOL/L (ref 3.5–5.1)
POTASSIUM SERPL-SCNC: 4.9 MMOL/L (ref 3.5–5.1)
POTASSIUM SERPL-SCNC: 5.2 MMOL/L (ref 3.5–5.1)
PROCALCITONIN SERPL IA-MCNC: 0.1 NG/ML
PROT SERPL-MCNC: 6.1 G/DL (ref 6–8.4)
PROTHROMBIN TIME: 11.7 SEC (ref 9–12.5)
RBC # BLD AUTO: 4.02 M/UL (ref 4–5.4)
RETICS/RBC NFR AUTO: 1.2 % (ref 0.5–2.5)
SATURATED IRON: 29 % (ref 20–50)
SODIUM SERPL-SCNC: 141 MMOL/L (ref 136–145)
SODIUM SERPL-SCNC: 142 MMOL/L (ref 136–145)
SODIUM SERPL-SCNC: 143 MMOL/L (ref 136–145)
SODIUM SERPL-SCNC: 143 MMOL/L (ref 136–145)
TOTAL IRON BINDING CAPACITY: 284 UG/DL (ref 250–450)
TRANSFERRIN SERPL-MCNC: 192 MG/DL (ref 200–375)
VANCOMYCIN SERPL-MCNC: 15.2 UG/ML
VIT B12 SERPL-MCNC: 595 PG/ML (ref 210–950)
WBC # BLD AUTO: 4.15 K/UL (ref 3.9–12.7)

## 2023-05-28 PROCEDURE — 82746 ASSAY OF FOLIC ACID SERUM: CPT | Performed by: STUDENT IN AN ORGANIZED HEALTH CARE EDUCATION/TRAINING PROGRAM

## 2023-05-28 PROCEDURE — 83921 ORGANIC ACID SINGLE QUANT: CPT | Performed by: STUDENT IN AN ORGANIZED HEALTH CARE EDUCATION/TRAINING PROGRAM

## 2023-05-28 PROCEDURE — 80048 BASIC METABOLIC PNL TOTAL CA: CPT | Mod: XB | Performed by: NURSE PRACTITIONER

## 2023-05-28 PROCEDURE — 84145 PROCALCITONIN (PCT): CPT | Performed by: STUDENT IN AN ORGANIZED HEALTH CARE EDUCATION/TRAINING PROGRAM

## 2023-05-28 PROCEDURE — 25000003 PHARM REV CODE 250: Performed by: STUDENT IN AN ORGANIZED HEALTH CARE EDUCATION/TRAINING PROGRAM

## 2023-05-28 PROCEDURE — 85025 COMPLETE CBC W/AUTO DIFF WBC: CPT | Performed by: NURSE PRACTITIONER

## 2023-05-28 PROCEDURE — 83735 ASSAY OF MAGNESIUM: CPT | Performed by: NURSE PRACTITIONER

## 2023-05-28 PROCEDURE — 36415 COLL VENOUS BLD VENIPUNCTURE: CPT | Performed by: NURSE PRACTITIONER

## 2023-05-28 PROCEDURE — 83615 LACTATE (LD) (LDH) ENZYME: CPT | Performed by: STUDENT IN AN ORGANIZED HEALTH CARE EDUCATION/TRAINING PROGRAM

## 2023-05-28 PROCEDURE — 96372 THER/PROPH/DIAG INJ SC/IM: CPT | Performed by: NURSE PRACTITIONER

## 2023-05-28 PROCEDURE — 96367 TX/PROPH/DG ADDL SEQ IV INF: CPT

## 2023-05-28 PROCEDURE — S0030 INJECTION, METRONIDAZOLE: HCPCS | Performed by: STUDENT IN AN ORGANIZED HEALTH CARE EDUCATION/TRAINING PROGRAM

## 2023-05-28 PROCEDURE — 84100 ASSAY OF PHOSPHORUS: CPT | Performed by: NURSE PRACTITIONER

## 2023-05-28 PROCEDURE — 80202 ASSAY OF VANCOMYCIN: CPT | Performed by: STUDENT IN AN ORGANIZED HEALTH CARE EDUCATION/TRAINING PROGRAM

## 2023-05-28 PROCEDURE — 99231 SBSQ HOSP IP/OBS SF/LOW 25: CPT | Mod: ,,, | Performed by: HOSPITALIST

## 2023-05-28 PROCEDURE — 80048 BASIC METABOLIC PNL TOTAL CA: CPT | Mod: 91,XB | Performed by: NURSE PRACTITIONER

## 2023-05-28 PROCEDURE — 63600175 PHARM REV CODE 636 W HCPCS: Performed by: NURSE PRACTITIONER

## 2023-05-28 PROCEDURE — 86140 C-REACTIVE PROTEIN: CPT | Performed by: STUDENT IN AN ORGANIZED HEALTH CARE EDUCATION/TRAINING PROGRAM

## 2023-05-28 PROCEDURE — 36415 COLL VENOUS BLD VENIPUNCTURE: CPT | Performed by: STUDENT IN AN ORGANIZED HEALTH CARE EDUCATION/TRAINING PROGRAM

## 2023-05-28 PROCEDURE — 63600175 PHARM REV CODE 636 W HCPCS: Performed by: STUDENT IN AN ORGANIZED HEALTH CARE EDUCATION/TRAINING PROGRAM

## 2023-05-28 PROCEDURE — 96366 THER/PROPH/DIAG IV INF ADDON: CPT

## 2023-05-28 PROCEDURE — 82607 VITAMIN B-12: CPT | Performed by: STUDENT IN AN ORGANIZED HEALTH CARE EDUCATION/TRAINING PROGRAM

## 2023-05-28 PROCEDURE — 85730 THROMBOPLASTIN TIME PARTIAL: CPT | Performed by: STUDENT IN AN ORGANIZED HEALTH CARE EDUCATION/TRAINING PROGRAM

## 2023-05-28 PROCEDURE — 82525 ASSAY OF COPPER: CPT | Performed by: STUDENT IN AN ORGANIZED HEALTH CARE EDUCATION/TRAINING PROGRAM

## 2023-05-28 PROCEDURE — 85045 AUTOMATED RETICULOCYTE COUNT: CPT | Performed by: STUDENT IN AN ORGANIZED HEALTH CARE EDUCATION/TRAINING PROGRAM

## 2023-05-28 PROCEDURE — 80053 COMPREHEN METABOLIC PANEL: CPT | Performed by: NURSE PRACTITIONER

## 2023-05-28 PROCEDURE — 99231 PR SUBSEQUENT HOSPITAL CARE,LEVL I: ICD-10-PCS | Mod: ,,, | Performed by: HOSPITALIST

## 2023-05-28 PROCEDURE — 11000001 HC ACUTE MED/SURG PRIVATE ROOM

## 2023-05-28 PROCEDURE — 85610 PROTHROMBIN TIME: CPT | Performed by: STUDENT IN AN ORGANIZED HEALTH CARE EDUCATION/TRAINING PROGRAM

## 2023-05-28 PROCEDURE — 82728 ASSAY OF FERRITIN: CPT | Performed by: STUDENT IN AN ORGANIZED HEALTH CARE EDUCATION/TRAINING PROGRAM

## 2023-05-28 PROCEDURE — 84466 ASSAY OF TRANSFERRIN: CPT | Performed by: STUDENT IN AN ORGANIZED HEALTH CARE EDUCATION/TRAINING PROGRAM

## 2023-05-28 RX ORDER — PANTOPRAZOLE SODIUM 40 MG/1
40 FOR SUSPENSION ORAL DAILY
Status: DISCONTINUED | OUTPATIENT
Start: 2023-05-28 | End: 2023-05-31 | Stop reason: HOSPADM

## 2023-05-28 RX ORDER — MUPIROCIN 20 MG/G
OINTMENT TOPICAL 2 TIMES DAILY
Status: DISCONTINUED | OUTPATIENT
Start: 2023-05-28 | End: 2023-05-31 | Stop reason: HOSPADM

## 2023-05-28 RX ADMIN — METRONIDAZOLE 500 MG: 500 INJECTION, SOLUTION INTRAVENOUS at 08:05

## 2023-05-28 RX ADMIN — PANTOPRAZOLE SODIUM 40 MG: 40 GRANULE, DELAYED RELEASE ORAL at 08:05

## 2023-05-28 RX ADMIN — VANCOMYCIN HYDROCHLORIDE 1750 MG: 5 INJECTION, POWDER, LYOPHILIZED, FOR SOLUTION INTRAVENOUS at 08:05

## 2023-05-28 RX ADMIN — METRONIDAZOLE 500 MG: 500 INJECTION, SOLUTION INTRAVENOUS at 12:05

## 2023-05-28 RX ADMIN — INSULIN ASPART 2 UNITS: 100 INJECTION, SOLUTION INTRAVENOUS; SUBCUTANEOUS at 08:05

## 2023-05-28 RX ADMIN — SODIUM ZIRCONIUM CYCLOSILICATE 10 G: 5 POWDER, FOR SUSPENSION ORAL at 03:05

## 2023-05-28 RX ADMIN — LACTULOSE 20 G: 20 SOLUTION ORAL at 03:05

## 2023-05-28 RX ADMIN — LACTULOSE 20 G: 20 SOLUTION ORAL at 08:05

## 2023-05-28 RX ADMIN — SODIUM ZIRCONIUM CYCLOSILICATE 10 G: 5 POWDER, FOR SUSPENSION ORAL at 08:05

## 2023-05-28 RX ADMIN — METRONIDAZOLE 500 MG: 500 INJECTION, SOLUTION INTRAVENOUS at 06:05

## 2023-05-28 RX ADMIN — CEFTRIAXONE SODIUM 2 G: 2 INJECTION, POWDER, FOR SOLUTION INTRAMUSCULAR; INTRAVENOUS at 05:05

## 2023-05-28 RX ADMIN — MUPIROCIN: 20 OINTMENT TOPICAL at 08:05

## 2023-05-28 NOTE — ASSESSMENT & PLAN NOTE
Large left pleural effusion, concerning for underlying infective focus  Pulmonology is following   No indication for thoracentesis at this time   Continuing antibiotics ceftriaxone and metronidazole

## 2023-05-28 NOTE — PROGRESS NOTES
Nephrology Progress Note       Consult Requested By: Michael Harris MD  Reason for Consult: CKD4 Hyperkalemia      SUBJECTIVE:        ?    Review of Systems   Constitutional:  Negative for chills and fever.   HENT:  Positive for sore throat. Negative for congestion.    Eyes:  Negative for blurred vision, double vision and photophobia.   Respiratory:  Negative for cough and shortness of breath.    Cardiovascular:  Negative for chest pain, palpitations and leg swelling.   Gastrointestinal:  Negative for abdominal pain, diarrhea, nausea and vomiting.   Genitourinary:  Negative for dysuria and urgency.   Musculoskeletal:  Positive for back pain. Negative for joint pain and myalgias.   Skin:  Negative for itching and rash.   Neurological:  Positive for weakness. Negative for dizziness, sensory change and headaches.   Endo/Heme/Allergies:  Negative for polydipsia. Does not bruise/bleed easily.   Psychiatric/Behavioral:  Negative for depression.       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    Benign essential hypertension 11/14/2012    Cataract     s/p phacoemulsification    Chronic kidney disease (CKD), stage III (moderate) 9/12/2013    Diabetic peripheral neuropathy associated with type 2 diabetes mellitus 11/14/2014    causing right hemiparesis    Gait disorder     Hyperlipidemia     Iritis - Both Eyes 6/10/2013    Kidney stone     Lymphedema     Morbid obesity with BMI of 40.0-44.9, adult 2/18/2015    Nephrolithiasis 4/20/2016    NS (nuclear sclerosis) 4/1/2013    Nuclear sclerosis - Both Eyes 4/29/2013    Preseptal cellulitis - Right Eye 4/29/2013    Proliferative diabetic retinopathy - Both Eyes 4/29/2013    Proliferative diabetic retinopathy, both eyes 4/1/2013    PSC (posterior subcapsular cataract) - Both Eyes 4/29/2013    S/P hernia repair 12/19/2012    TIA (transient ischemic attack) 11/18/2014    Tinea pedis 7/24/2012    Tinea pedis is present on both  feet.     Type 2 diabetes mellitus with diabetic polyneuropathy, with long-term current use of insulin 9/18/2015    Type 2 diabetes mellitus with renal manifestations, controlled 12/12/2013    Type 2 diabetes, controlled, with moderate nonproliferative diabetic retinopathy without macular edema 9/17/2015    Ulcer of left lower extremity, limited to breakdown of skin 7/8/2015    Unspecified cerebral artery occlusion with cerebral infarction 11/16/2014    Unspecified venous (peripheral) insufficiency     Ureteral stone with hydronephrosis 1/27/2016    UTI (lower urinary tract infection)     Vaginal infection     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     Past Surgical History:   Procedure Laterality Date    ABLATION Bilateral 6/23/2022    Procedure: Ablation;  Surgeon: Vahid Farfan MD;  Location: [facility] CATH LAB/EP;  Service: Cardiology;  Laterality: Bilateral;    ABLATION Right 11/17/2022    Procedure: Ablation;  Surgeon: Vahid Farfan MD;  Location: [facility] CATH LAB/EP;  Service: Cardiology;  Laterality: Right;    APPENDECTOMY      CATARACT EXTRACTION W/  INTRAOCULAR LENS IMPLANT Left 5/21/2013    CATARACT EXTRACTION W/  INTRAOCULAR LENS IMPLANT Right 6/4/2013    CHOLECYSTECTOMY      COLONOSCOPY  12/22/2005    normal    COLONOSCOPY N/A 7/14/2022    Procedure: COLONOSCOPY;  Surgeon: Kannan Mace MD;  Location: [facility];  Service: Endoscopy;  Laterality: N/A;    ESOPHAGOGASTRODUODENOSCOPY  12/21/2015    hiatal hernia, Schatzki ring    ESOPHAGOGASTRODUODENOSCOPY N/A 7/13/2022    Procedure: EGD (ESOPHAGOGASTRODUODENOSCOPY);  Surgeon: Kannan Mace MD;  Location: [facility];  Service: Endoscopy;  Laterality: N/A;    EYE SURGERY Bilateral 2008    laser surgery both eyes    INTRALUMINAL GASTROINTESTINAL TRACT IMAGING VIA CAPSULE N/A 7/15/2022    Procedure: IMAGING PROCEDURE, GI TRACT, INTRALUMINAL, VIA CAPSULE;  Surgeon: Kannan Mace MD;  Location: [facility];  Service: Endoscopy;  Laterality: N/A;    NASAL  SEPTUM SURGERY      SMALL BOWEL ENTEROSCOPY N/A 2022    Procedure: ENTEROSCOPY Upper SBE;  Surgeon: Salo Frank MD;  Location: [facility];  Service: Endoscopy;  Laterality: N/A;    SUBTOTAL COLECTOMY  2012    transverse colon, for incarcerated umbilical hernia, Dr. Kat Bower     Family History   Problem Relation Age of Onset    Diabetes Sister     Cataracts Sister     Heart disease Brother     Cataracts Brother     Leukemia Mother     Cancer Neg Hx     Amblyopia Neg Hx     Blindness Neg Hx     Glaucoma Neg Hx     Hypertension Neg Hx     Macular degeneration Neg Hx     Retinal detachment Neg Hx     Strabismus Neg Hx     Stroke Neg Hx     Thyroid disease Neg Hx     Kidney disease Neg Hx      Social History     Tobacco Use    Smoking status: Former     Packs/day: 0.50     Years: 15.00     Pack years: 7.50     Types: Cigarettes     Quit date: 1982     Years since quittin.9    Smokeless tobacco: Former    Tobacco comments:     smoked one pack per week   Substance Use Topics    Alcohol use: No    Drug use: No       Review of patient's allergies indicates:   Allergen Reactions    Penicillins Hives     Other reaction(s): Hives  Patient has received cefdinir, ceftriaxone, cefazolin and cefepime in the past with no documented reactions    Sulfa (sulfonamide antibiotics) Other (See Comments)     Shakes, pt states her doctor told her the shakes were possibly caused by an allergy to sulfa            OBJECTIVE:     Vital Signs (Most Recent)  Vitals:    23 0400 23 0541 23 0709 23 0745   BP:  (!) 137/59  (!) 172/62   BP Location:       Patient Position:       Pulse: 65 (!) 57 (!) 53 97   Resp:  20  18   Temp:  97.3 °F (36.3 °C)  97.5 °F (36.4 °C)   TempSrc:    Axillary   SpO2:  (!) 94%  (!) 93%   Weight:       Height:             Date 23 0700 - 23 0659   Shift 9263-8640 0854-5784 2693-2314 24 Hour Total   INTAKE   P.O. 120   120   Shift Total(mL/kg) 120(1)    120(1)   OUTPUT   Shift Total(mL/kg)       Weight (kg) 119.1 119.1 119.1 119.1           Medications:   cefTRIAXone (ROCEPHIN) IVPB  2 g Intravenous Q24H    lactulose  20 g Per NG tube TID    metronidazole  500 mg Intravenous Q8H    pantoprazole  40 mg Oral Daily    sodium zirconium cyclosilicate  10 g Per G Tube TID           Physical Exam  Vitals and nursing note reviewed.   Constitutional:       General: She is not in acute distress.     Appearance: She is ill-appearing. She is not diaphoretic.   HENT:      Head: Normocephalic and atraumatic.      Mouth/Throat:      Pharynx: No oropharyngeal exudate.   Eyes:      General: No scleral icterus.     Conjunctiva/sclera: Conjunctivae normal.      Pupils: Pupils are equal, round, and reactive to light.   Cardiovascular:      Rate and Rhythm: Normal rate and regular rhythm.      Heart sounds: Normal heart sounds. No murmur heard.  Pulmonary:      Effort: Pulmonary effort is normal. No respiratory distress.      Breath sounds: Normal breath sounds.   Abdominal:      General: Bowel sounds are normal. There is no distension.      Palpations: Abdomen is soft.      Tenderness: There is no abdominal tenderness.   Musculoskeletal:         General: Normal range of motion.      Cervical back: Normal range of motion and neck supple.   Skin:     General: Skin is warm and dry.      Findings: No erythema.   Neurological:      Mental Status: She is alert.      Cranial Nerves: No cranial nerve deficit.       Laboratory:  Recent Labs   Lab 05/26/23  1656 05/27/23  0440 05/28/23  0444   WBC 1.86* 1.81* 4.15   HGB 11.1* 9.7* 9.8*   HCT 38.1 32.0* 33.0*   PLT 82* 89* 103*   MONO 6.0 2.0*  CANCELED 9.2  0.4       Recent Labs   Lab 05/22/23  0939 05/26/23  1656 05/27/23  0440 05/27/23  0801 05/28/23  0008 05/28/23  0444 05/28/23  0805   *   < > 144  144   < > 142 141  141 143   K 5.2*   < > 5.7*  5.7*   < > 5.2* 4.8  4.9 4.5      < > 111*  111*   < > 106 105  105 105    CO2 26   < > 29  29   < > 24 26  26 26   BUN 28*   < > 25*  26*   < > 30* 29*  30* 30*   CREATININE 1.8*   < > 2.0*  2.0*   < > 2.8* 2.9*  2.9* 2.8*   CALCIUM 9.9   < > 9.1  9.1   < > 9.0 8.9  8.9 8.7   PHOS 3.8  --  4.1  --   --  4.2  --     < > = values in this interval not displayed.         Diagnostic Results:  X-Ray: Reviewed  US: Reviewed  Echo: Reviewed  ASSESSMENT/PLAN:     1. CKD4  DM/HTN   -- B/L cr 2.0   -- Now  up  Hyperkalemia  - per family discussion no agressive interventions   No dialysis. Medical management only.   -- Daily Renal Function Panel  -- Avoid Hypotension.  -- Renally dose all meds  -- Please avoid nephrotoxins, including NSAIDs, aminoglycosides, IV contrast (unless absolutely necessary), gadolinium, fleets and other phosphorous-based laxatives. Caution with antibiotics.    Hyperkalemia    -- Check US kidney - pending   -- bladder no retention   -- Lokelma TID for now  shifted with Insulin   -- avoid Acidosis Hyperchloremia    -- lasix   Hyperkalemia resolved 4.5 now   Need to rule out infection on IV abx already   Lactulose to have BM helps with K+     2. HTN (I10) -  controled   3. Anemia of chronic kidney disease       Recent Labs   Lab 05/26/23  1656 05/27/23  0440 05/28/23  0444   HGB 11.1* 9.7* 9.8*   HCT 38.1 32.0* 33.0*   PLT 82* 89* 103*         Iron   Lab Results   Component Value Date    IRON 44 04/16/2021    TIBC 318 04/16/2021    FERRITIN 119 05/28/2023       4. 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 Nutrition/Hypoalbuminemia  (E88.09) -    Recent Labs   Lab 05/27/23 0440 05/28/23 0444   LABPROT  --  11.7   ALBUMIN 2.2* 2.2*       Nepro with meals TID.        Thank you for allowing me to participate in care of your patient  With any question please call   Cesar Timmons MD     [facility]  RAISA Oliver MD,   MD JAYME Car MD E. V. Harmon, NP  [address]   [place], [address]  [phone]  After hours answering service: [phone]

## 2023-05-28 NOTE — ASSESSMENT & PLAN NOTE
Advance Care Planning     Date: 05/26/2023    Code Status  In light of the patients advanced and life limiting illness,I engaged the the patient in a conversation about the patient's preferences for care  at the very end of life. The patient wishes to have a natural, peaceful death.  Along those lines, the patient does not wish to have CPR or other invasive treatments performed when her heart and/or breathing stops. I communicated to the patient that a DNR order would be placed in her medical record to reflect this preference.  I spent a total of 6 minutes engaging the patient in this advance care planning discussion.    LaPOST in paper chart.  Picture uploaded into media     Patient's family will continue to address goals of care with the patient.  We will continue an open discussion regarding patient's desires for further care and tentative plans for home hospice upon discharge.    No hospice consult at this time; will continue discussing with the family and consult hospice for an information session closer to when she discharges home.

## 2023-05-28 NOTE — SUBJECTIVE & OBJECTIVE
Interval History:  Hypoglycemia pancytopenia improving.  Hyperkalemia is improving.  Her mental status is improving this morning.    Review of Systems   Unable to perform ROS: Mental status change   Objective:     Vital Signs (Most Recent):  Temp: 97.5 °F (36.4 °C) (05/28/23 0745)  Pulse: 97 (05/28/23 0745)  Resp: 18 (05/28/23 0745)  BP: (!) 172/62 (05/28/23 0745)  SpO2: (!) 93 % (05/28/23 0745) Vital Signs (24h Range):  Temp:  [96.6 °F (35.9 °C)-97.5 °F (36.4 °C)] 97.5 °F (36.4 °C)  Pulse:  [53-97] 97  Resp:  [18-20] 18  SpO2:  [93 %-96 %] 93 %  BP: ()/(52-62) 172/62     Weight: 119.1 kg (262 lb 9.1 oz)  Body mass index is 41.12 kg/m².    Intake/Output Summary (Last 24 hours) at 5/28/2023 1116  Last data filed at 5/28/2023 0847  Gross per 24 hour   Intake 120 ml   Output 300 ml   Net -180 ml           Physical Exam  Vitals reviewed.   Constitutional:       General: She is not in acute distress.     Appearance: She is obese. She is ill-appearing.   HENT:      Head: Normocephalic.   Eyes:      General:         Right eye: No discharge.         Left eye: No discharge.   Neck:      Comments: Trachea midline.  Cardiovascular:      Rate and Rhythm: Normal rate and regular rhythm.   Pulmonary:      Breath sounds: No wheezing or rales.      Comments: Decreased breath sounds of the left lower and left mid lung fields  Abdominal:      General: There is no distension.      Tenderness: There is no abdominal tenderness.   Musculoskeletal:      Right lower leg: Edema present.      Left lower leg: Edema present.   Skin:     General: Skin is warm.      Capillary Refill: Capillary refill takes less than 2 seconds.      Comments: Chronic skin changes of bilateral lower extremities.   Neurological:      Comments: Localizes.  Drowsy, but opens eyes to stimuli.  Intermittently follows commands.  Moves all 4 extremities.           Significant Labs: All pertinent labs within the past 24 hours have been reviewed.  CBC:   Recent Labs    Lab 05/26/23  1656 05/27/23  0440 05/28/23  0444   WBC 1.86* 1.81* 4.15   HGB 11.1* 9.7* 9.8*   HCT 38.1 32.0* 33.0*   PLT 82* 89* 103*       CMP:   Recent Labs   Lab 05/26/23  1656 05/27/23  0038 05/27/23  0440 05/27/23  0801 05/28/23  0008 05/28/23  0444 05/28/23  0805      < > 144  144   < > 142 141  141 143   K 5.3*   < > 5.7*  5.7*   < > 5.2* 4.8  4.9 4.5      < > 111*  111*   < > 106 105  105 105   CO2 27   < > 29  29   < > 24 26  26 26   GLU 91   < > 72  71   < > 224* 223*  221* 217*   BUN 25*   < > 25*  26*   < > 30* 29*  30* 30*   CREATININE 1.9*   < > 2.0*  2.0*   < > 2.8* 2.9*  2.9* 2.8*   CALCIUM 9.6   < > 9.1  9.1   < > 9.0 8.9  8.9 8.7   PROT 7.0  --  6.2  --   --  6.1  --    ALBUMIN 2.5*  --  2.2*  --   --  2.2*  --    BILITOT 0.4  --  0.3  --   --  0.4  --    ALKPHOS 197*  --  168*  --   --  175*  --    AST 42*  --  42*  --   --  53*  --    ALT 33  --  31  --   --  30  --    ANIONGAP 6*   < > 4*  4*   < > 12 10  10 12    < > = values in this interval not displayed.         Significant Imaging: I have reviewed all pertinent imaging results/findings within the past 24 hours.

## 2023-05-28 NOTE — CONSULTS
HEMATOLOGY UPDATE    Sherin Ortiz is a 78 y.o. woman with with multiple comorbidities including HTN, DMT2, HLD, obesity, TIA, CKD stage 4, and PVD.  She was admitted from the ED after presenting from her SNF with progressive malaise, dyspnea, and dysphagia. On presentation she was found to have hypothermia with T 96.9, pulmonary edema, loculated left pleural effusions, hypoglycemia, and pancytopenia including WBC 1.81K; HGB 9.7; and PLT 89K. Hematology consulted for cytopenia.     Reviewed smear yesterday showing moderate anisopoikilocytosis, low PLT, and low WBC with normal differentiate and occaisional toxic gransulation. No evidence of a primary bone marrow process. CBC today appears improved after initiation of antibiotics and supportive care. Iron studies normal. Folate mildly low. B12 normal. Consider folate supplement.    Hematology will sign off. Please reconsult with any additional questions or concerns.

## 2023-05-28 NOTE — ASSESSMENT & PLAN NOTE
Improving   Hyperkalemia in the setting of LOREN.  Patient had peak potassium was 6.4 requiring aggressive medical management in the inpatient setting.    Continue Lokelma t.i.d. by a NG tube   Continue lactulose until bowel movement   Continue bicarbonate infusion   Nephrology following   Discontinue Lasix of abundance of caution for her LOREN this morning; will discuss need for diuretics with Nephrology

## 2023-05-28 NOTE — NURSING
RAPID RESPONSE NURSE FOLLOW-UP     Time of Chart Review: 1250    Last Vitals:  Temp: 97.5 °F (36.4 °C) (05/28 0745)  Pulse: 54 (05/28 1209)  Resp: 18 (05/28 1209)  BP: 114/56 (05/28 1209)  SpO2: 96 % (05/28 1209)    ASSESSMENT/INTERVENTIONS  K now 4.1 on labs drawn @ 1200.   Continue POC.     FOLLOW UP  Call back the Rapid Response Nurse, Marshall Cabral RN at [phone] for additional questions or concerns.

## 2023-05-28 NOTE — PLAN OF CARE
Problem: Adult Inpatient Plan of Care  Goal: Plan of Care Review  Outcome: Ongoing, Progressing  Goal: Patient-Specific Goal (Individualized)  Outcome: Ongoing, Progressing  Goal: Absence of Hospital-Acquired Illness or Injury  Outcome: Ongoing, Progressing  Goal: Optimal Comfort and Wellbeing  Outcome: Ongoing, Progressing  Goal: Readiness for Transition of Care  Outcome: Ongoing, Progressing     Problem: Bariatric Environmental Safety  Goal: Safety Maintained with Care  Outcome: Ongoing, Progressing     Problem: Diabetes Comorbidity  Goal: Blood Glucose Level Within Targeted Range  Outcome: Ongoing, Progressing     Problem: Fluid and Electrolyte Imbalance (Acute Kidney Injury/Impairment)  Goal: Fluid and Electrolyte Balance  Outcome: Ongoing, Progressing     Problem: Oral Intake Inadequate (Acute Kidney Injury/Impairment)  Goal: Optimal Nutrition Intake  Outcome: Ongoing, Progressing     Problem: Renal Function Impairment (Acute Kidney Injury/Impairment)  Goal: Effective Renal Function  Outcome: Ongoing, Progressing     Problem: Fluid Imbalance (Pneumonia)  Goal: Fluid Balance  Outcome: Ongoing, Progressing     Problem: Infection (Pneumonia)  Goal: Resolution of Infection Signs and Symptoms  Outcome: Ongoing, Progressing     Problem: Respiratory Compromise (Pneumonia)  Goal: Effective Oxygenation and Ventilation  Outcome: Ongoing, Progressing     Problem: Fall Injury Risk  Goal: Absence of Fall and Fall-Related Injury  Outcome: Ongoing, Progressing     Problem: Swallowing Impairment  Goal: Optimal Eating and Swallowing Without Aspiration  Outcome: Ongoing, Progressing     Problem: Bleeding Risk or Actual (Thrombocytopenia)  Goal: Absence of Bleeding  Outcome: Ongoing, Progressing     Problem: Skin Injury Risk Increased  Goal: Skin Health and Integrity  Outcome: Ongoing, Progressing

## 2023-05-28 NOTE — PROGRESS NOTES
[facility] Medicine  Progress Note    Patient Name: Sherin Ortiz  MRN: [id number]  Patient Class: OP- Observation   Admission Date: 5/26/2023  Length of Stay: 0 days  Attending Physician: Michael Harris MD  Primary Care Provider: Darby Baum MD        Subjective:     Principal Problem:Pleural effusion, left        HPI:  Sherin Ortiz is an 78-year-old female with PMH HTN, DMT2, HLD, debility, HFpEF, obesity, TIA, CKD stage 4, and PVD.  She was brought in from her nursing home for evaluation of difficulty swallowing for the past month.  She states that she can swallow pills and food but it feels like it is stuck in her throat.  She denies coughing or choking with swallowing.  However, she does report an occasional productive cough with thick white sputum and SOB.  She denies CP, N/V/D, abdominal pain, fever, chills, or trouble speaking.  Patient is a poor historian, spoke with her daughter via telephone who confirms the above history.          Overview/Hospital Course:  78-year-old woman with complaints of neck pain, difficulty swallowing, and progressive decline over the last 4-6 weeks.  She initially presented to the hospital for dysphagia and was found to have large left loculated pleural effusion, pancytopenia, hyperglycemia and hypothermia concerning for underlying infective process.      Interval History:  Hypoglycemia pancytopenia improving.  Hyperkalemia is improving.  Her mental status is improving this morning.    Review of Systems   Unable to perform ROS: Mental status change   Objective:     Vital Signs (Most Recent):  Temp: 97.5 °F (36.4 °C) (05/28/23 0745)  Pulse: 97 (05/28/23 0745)  Resp: 18 (05/28/23 0745)  BP: (!) 172/62 (05/28/23 0745)  SpO2: (!) 93 % (05/28/23 0745) Vital Signs (24h Range):  Temp:  [96.6 °F (35.9 °C)-97.5 °F (36.4 °C)] 97.5 °F (36.4 °C)  Pulse:  [53-97] 97  Resp:  [18-20] 18  SpO2:  [93 %-96 %] 93 %  BP: ()/(52-62) 172/62     Weight: 119.1 kg  (262 lb 9.1 oz)  Body mass index is 41.12 kg/m².    Intake/Output Summary (Last 24 hours) at 5/28/2023 1116  Last data filed at 5/28/2023 0847  Gross per 24 hour   Intake 120 ml   Output 300 ml   Net -180 ml           Physical Exam  Vitals reviewed.   Constitutional:       General: She is not in acute distress.     Appearance: She is obese. She is ill-appearing.   HENT:      Head: Normocephalic.   Eyes:      General:         Right eye: No discharge.         Left eye: No discharge.   Neck:      Comments: Trachea midline.  Cardiovascular:      Rate and Rhythm: Normal rate and regular rhythm.   Pulmonary:      Breath sounds: No wheezing or rales.      Comments: Decreased breath sounds of the left lower and left mid lung fields  Abdominal:      General: There is no distension.      Tenderness: There is no abdominal tenderness.   Musculoskeletal:      Right lower leg: Edema present.      Left lower leg: Edema present.   Skin:     General: Skin is warm.      Capillary Refill: Capillary refill takes less than 2 seconds.      Comments: Chronic skin changes of bilateral lower extremities.   Neurological:      Comments: Localizes.  Drowsy, but opens eyes to stimuli.  Intermittently follows commands.  Moves all 4 extremities.           Significant Labs: All pertinent labs within the past 24 hours have been reviewed.  CBC:   Recent Labs   Lab 05/26/23  1656 05/27/23  0440 05/28/23  0444   WBC 1.86* 1.81* 4.15   HGB 11.1* 9.7* 9.8*   HCT 38.1 32.0* 33.0*   PLT 82* 89* 103*       CMP:   Recent Labs   Lab 05/26/23  1656 05/27/23  0038 05/27/23  0440 05/27/23  0801 05/28/23  0008 05/28/23  0444 05/28/23  0805      < > 144  144   < > 142 141  141 143   K 5.3*   < > 5.7*  5.7*   < > 5.2* 4.8  4.9 4.5      < > 111*  111*   < > 106 105  105 105   CO2 27   < > 29  29   < > 24 26  26 26   GLU 91   < > 72  71   < > 224* 223*  221* 217*   BUN 25*   < > 25*  26*   < > 30* 29*  30* 30*   CREATININE 1.9*   < > 2.0*   2.0*   < > 2.8* 2.9*  2.9* 2.8*   CALCIUM 9.6   < > 9.1  9.1   < > 9.0 8.9  8.9 8.7   PROT 7.0  --  6.2  --   --  6.1  --    ALBUMIN 2.5*  --  2.2*  --   --  2.2*  --    BILITOT 0.4  --  0.3  --   --  0.4  --    ALKPHOS 197*  --  168*  --   --  175*  --    AST 42*  --  42*  --   --  53*  --    ALT 33  --  31  --   --  30  --    ANIONGAP 6*   < > 4*  4*   < > 12 10  10 12    < > = values in this interval not displayed.         Significant Imaging: I have reviewed all pertinent imaging results/findings within the past 24 hours.      Assessment/Plan:      * Pleural effusion, left  Large left pleural effusion, concerning for underlying infective focus  Pulmonology is following   No indication for thoracentesis at this time   Continuing antibiotics ceftriaxone and metronidazole      Pancytopenia  Improving.    Discontinue neutropenic precautions   Hematology is following   Repeat CBC      ACP (advance care planning)  Advance Care Planning     Date: 05/26/2023    Code Status  In light of the patients advanced and life limiting illness,I engaged the the patient in a conversation about the patient's preferences for care  at the very end of life. The patient wishes to have a natural, peaceful death.  Along those lines, the patient does not wish to have CPR or other invasive treatments performed when her heart and/or breathing stops. I communicated to the patient that a DNR order would be placed in her medical record to reflect this preference.  I spent a total of 6 minutes engaging the patient in this advance care planning discussion.    LaPOST in paper chart.  Picture uploaded into media     Patient's family will continue to address goals of care with the patient.  We will continue an open discussion regarding patient's desires for further care and tentative plans for home hospice upon discharge.    No hospice consult at this time; will continue discussing with the family and consult hospice for an information session  closer to when she discharges home.        Venous insufficiency of both lower extremities  Stable.  Wound Care consulted      (HFpEF) heart failure with preserved ejection fraction  Does not appear to be in exacerbation   Continue to monitor  Hold oral diuretics      Dermatitis, stasis        Hyperkalemia  Improving   Hyperkalemia in the setting of LOREN.  Patient had peak potassium was 6.4 requiring aggressive medical management in the inpatient setting.    Continue Lokelma t.i.d. by a NG tube   Continue lactulose until bowel movement   Continue bicarbonate infusion   Nephrology following   Discontinue Lasix of abundance of caution for her LOREN this morning; will discuss need for diuretics with Nephrology      Type 2 diabetes mellitus with diabetic polyneuropathy, with long-term current use of insulin  Patient's FSGs are controlled on current medication regimen.  Last A1c reviewed-   Lab Results   Component Value Date    HGBA1C 8.1 (H) 03/23/2023     Most recent fingerstick glucose reviewed-   Recent Labs   Lab 05/27/23  1608 05/27/23  1945 05/28/23  0627   POCTGLUCOSE 99 270* 242*     Current correctional scale  Low  Maintain anti-hyperglycemic dose as follows-   Antihyperglycemics (From admission, onward)    Start     Stop Route Frequency Ordered    05/26/23 2055  insulin aspart U-100 pen 0-5 Units         -- SubQ Before meals & nightly PRN 05/26/23 2008        Hold Oral hypoglycemics while patient is in the hospital.    Dysphagia  Reports of dysphagia for several weeks prior to presentation.  ENT evaluation including flexible laryngoscopy was relatively unrevealing.    Placed Dobbhoff to assist with medication administration   When mentation improves, pursue modified barium swallow, upper GI fluoroscopy, and later EGD if warranted.      Debility  Wheelchair-bound at baseline, but transfers to chair  Subacute decline over the last 4-6 weeks per discussion with family at bedside      Essential hypertension  Hold  lisinopril   Hold amlodipine      Hyperlipidemia LDL goal <100  Hold statin for now      VTE Risk Mitigation (From admission, onward)         Ordered     Reason for No Pharmacological VTE Prophylaxis  Once        Question:  Reasons:  Answer:  Thrombocytopenia    05/26/23 2008     IP VTE HIGH RISK PATIENT  Once         05/26/23 2008     Place sequential compression device  Until discontinued         05/26/23 2008                Discharge Planning   GABE:      Code Status: DNR   Is the patient medically ready for discharge?:     Reason for patient still in hospital (select all that apply): Treatment and Consult recommendations                     Michael Harris MD  Department of [facility] Medicine   [facility]

## 2023-05-28 NOTE — NURSING
Rapid Response Nurse Follow-up Note     Followed up with patient for proactive rounding.   Chart reviewed. K+ 5.2 on labs drawn at 2010. Lokelma 10g TID order in place. Dose given at 2153. No acute issues at this time.   Team will continue to follow.  Please call Rapid Response RN, JL CHINCHILLA, RN with any questions or concerns at [facility] Phone: [phone].

## 2023-05-28 NOTE — ASSESSMENT & PLAN NOTE
Patient's FSGs are controlled on current medication regimen.  Last A1c reviewed-   Lab Results   Component Value Date    HGBA1C 8.1 (H) 03/23/2023     Most recent fingerstick glucose reviewed-   Recent Labs   Lab 05/27/23  1608 05/27/23  1945 05/28/23 0627   POCTGLUCOSE 99 270* 242*     Current correctional scale  Low  Maintain anti-hyperglycemic dose as follows-   Antihyperglycemics (From admission, onward)    Start     Stop Route Frequency Ordered    05/26/23 2055  insulin aspart U-100 pen 0-5 Units         -- SubQ Before meals & nightly PRN 05/26/23 2008        Hold Oral hypoglycemics while patient is in the hospital.

## 2023-05-29 PROBLEM — J38.7 PRESBYLARYNX: Status: ACTIVE | Noted: 2023-05-29

## 2023-05-29 LAB
ALBUMIN SERPL BCP-MCNC: 2.1 G/DL (ref 3.5–5.2)
ALP SERPL-CCNC: 159 U/L (ref 55–135)
ALT SERPL W/O P-5'-P-CCNC: 31 U/L (ref 10–44)
ANION GAP SERPL CALC-SCNC: 10 MMOL/L (ref 8–16)
ANION GAP SERPL CALC-SCNC: 10 MMOL/L (ref 8–16)
ANION GAP SERPL CALC-SCNC: 11 MMOL/L (ref 8–16)
ANION GAP SERPL CALC-SCNC: 11 MMOL/L (ref 8–16)
ANION GAP SERPL CALC-SCNC: 9 MMOL/L (ref 8–16)
AST SERPL-CCNC: 60 U/L (ref 10–40)
BASOPHILS # BLD AUTO: 0 K/UL (ref 0–0.2)
BASOPHILS NFR BLD: 0 % (ref 0–1.9)
BILIRUB SERPL-MCNC: 0.4 MG/DL (ref 0.1–1)
BUN SERPL-MCNC: 29 MG/DL (ref 8–23)
CALCIUM SERPL-MCNC: 8.5 MG/DL (ref 8.7–10.5)
CALCIUM SERPL-MCNC: 8.7 MG/DL (ref 8.7–10.5)
CALCIUM SERPL-MCNC: 8.8 MG/DL (ref 8.7–10.5)
CHLORIDE SERPL-SCNC: 103 MMOL/L (ref 95–110)
CHLORIDE SERPL-SCNC: 104 MMOL/L (ref 95–110)
CHLORIDE SERPL-SCNC: 105 MMOL/L (ref 95–110)
CO2 SERPL-SCNC: 25 MMOL/L (ref 23–29)
CO2 SERPL-SCNC: 26 MMOL/L (ref 23–29)
CO2 SERPL-SCNC: 27 MMOL/L (ref 23–29)
CREAT SERPL-MCNC: 2.6 MG/DL (ref 0.5–1.4)
CREAT SERPL-MCNC: 2.7 MG/DL (ref 0.5–1.4)
CREAT SERPL-MCNC: 2.8 MG/DL (ref 0.5–1.4)
DIFFERENTIAL METHOD: ABNORMAL
EOSINOPHIL # BLD AUTO: 0 K/UL (ref 0–0.5)
EOSINOPHIL NFR BLD: 0.7 % (ref 0–8)
ERYTHROCYTE [DISTWIDTH] IN BLOOD BY AUTOMATED COUNT: 18.3 % (ref 11.5–14.5)
EST. GFR  (NO RACE VARIABLE): 17 ML/MIN/1.73 M^2
EST. GFR  (NO RACE VARIABLE): 18 ML/MIN/1.73 M^2
GLUCOSE SERPL-MCNC: 167 MG/DL (ref 70–110)
GLUCOSE SERPL-MCNC: 188 MG/DL (ref 70–110)
GLUCOSE SERPL-MCNC: 189 MG/DL (ref 70–110)
GLUCOSE SERPL-MCNC: 219 MG/DL (ref 70–110)
GLUCOSE SERPL-MCNC: 224 MG/DL (ref 70–110)
HCT VFR BLD AUTO: 30.6 % (ref 37–48.5)
HGB BLD-MCNC: 9.2 G/DL (ref 12–16)
IMM GRANULOCYTES # BLD AUTO: 0.01 K/UL (ref 0–0.04)
IMM GRANULOCYTES NFR BLD AUTO: 0.4 % (ref 0–0.5)
LYMPHOCYTES # BLD AUTO: 0.4 K/UL (ref 1–4.8)
LYMPHOCYTES NFR BLD: 15.3 % (ref 18–48)
MAGNESIUM SERPL-MCNC: 1.8 MG/DL (ref 1.6–2.6)
MCH RBC QN AUTO: 24.7 PG (ref 27–31)
MCHC RBC AUTO-ENTMCNC: 30.1 G/DL (ref 32–36)
MCV RBC AUTO: 82 FL (ref 82–98)
MONOCYTES # BLD AUTO: 0.3 K/UL (ref 0.3–1)
MONOCYTES NFR BLD: 8.9 % (ref 4–15)
NEUTROPHILS # BLD AUTO: 2.1 K/UL (ref 1.8–7.7)
NEUTROPHILS NFR BLD: 74.7 % (ref 38–73)
NRBC BLD-RTO: 0 /100 WBC
PHOSPHATE SERPL-MCNC: 4 MG/DL (ref 2.7–4.5)
PLATELET # BLD AUTO: 84 K/UL (ref 150–450)
PMV BLD AUTO: 10.6 FL (ref 9.2–12.9)
POCT GLUCOSE: 157 MG/DL (ref 70–110)
POCT GLUCOSE: 179 MG/DL (ref 70–110)
POCT GLUCOSE: 182 MG/DL (ref 70–110)
POCT GLUCOSE: 194 MG/DL (ref 70–110)
POCT GLUCOSE: 235 MG/DL (ref 70–110)
POTASSIUM SERPL-SCNC: 3.5 MMOL/L (ref 3.5–5.1)
POTASSIUM SERPL-SCNC: 3.6 MMOL/L (ref 3.5–5.1)
POTASSIUM SERPL-SCNC: 3.9 MMOL/L (ref 3.5–5.1)
PROT SERPL-MCNC: 5.7 G/DL (ref 6–8.4)
RBC # BLD AUTO: 3.73 M/UL (ref 4–5.4)
SODIUM SERPL-SCNC: 140 MMOL/L (ref 136–145)
SODIUM SERPL-SCNC: 141 MMOL/L (ref 136–145)
VANCOMYCIN SERPL-MCNC: 24.3 UG/ML
WBC # BLD AUTO: 2.81 K/UL (ref 3.9–12.7)

## 2023-05-29 PROCEDURE — 80048 BASIC METABOLIC PNL TOTAL CA: CPT | Mod: 91,XB | Performed by: STUDENT IN AN ORGANIZED HEALTH CARE EDUCATION/TRAINING PROGRAM

## 2023-05-29 PROCEDURE — 63600175 PHARM REV CODE 636 W HCPCS: Performed by: STUDENT IN AN ORGANIZED HEALTH CARE EDUCATION/TRAINING PROGRAM

## 2023-05-29 PROCEDURE — 92611 MOTION FLUOROSCOPY/SWALLOW: CPT

## 2023-05-29 PROCEDURE — 80048 BASIC METABOLIC PNL TOTAL CA: CPT | Mod: 91,XB | Performed by: NURSE PRACTITIONER

## 2023-05-29 PROCEDURE — 36415 COLL VENOUS BLD VENIPUNCTURE: CPT | Performed by: STUDENT IN AN ORGANIZED HEALTH CARE EDUCATION/TRAINING PROGRAM

## 2023-05-29 PROCEDURE — A9698 NON-RAD CONTRAST MATERIALNOC: HCPCS | Performed by: STUDENT IN AN ORGANIZED HEALTH CARE EDUCATION/TRAINING PROGRAM

## 2023-05-29 PROCEDURE — 85025 COMPLETE CBC W/AUTO DIFF WBC: CPT | Performed by: NURSE PRACTITIONER

## 2023-05-29 PROCEDURE — 11000001 HC ACUTE MED/SURG PRIVATE ROOM

## 2023-05-29 PROCEDURE — 84100 ASSAY OF PHOSPHORUS: CPT | Performed by: NURSE PRACTITIONER

## 2023-05-29 PROCEDURE — 80053 COMPREHEN METABOLIC PANEL: CPT | Performed by: NURSE PRACTITIONER

## 2023-05-29 PROCEDURE — 80048 BASIC METABOLIC PNL TOTAL CA: CPT | Mod: XB | Performed by: NURSE PRACTITIONER

## 2023-05-29 PROCEDURE — 36415 COLL VENOUS BLD VENIPUNCTURE: CPT | Performed by: NURSE PRACTITIONER

## 2023-05-29 PROCEDURE — 80202 ASSAY OF VANCOMYCIN: CPT | Performed by: STUDENT IN AN ORGANIZED HEALTH CARE EDUCATION/TRAINING PROGRAM

## 2023-05-29 PROCEDURE — 25500020 PHARM REV CODE 255: Performed by: STUDENT IN AN ORGANIZED HEALTH CARE EDUCATION/TRAINING PROGRAM

## 2023-05-29 PROCEDURE — 83735 ASSAY OF MAGNESIUM: CPT | Performed by: NURSE PRACTITIONER

## 2023-05-29 PROCEDURE — 97535 SELF CARE MNGMENT TRAINING: CPT

## 2023-05-29 PROCEDURE — S0030 INJECTION, METRONIDAZOLE: HCPCS | Performed by: STUDENT IN AN ORGANIZED HEALTH CARE EDUCATION/TRAINING PROGRAM

## 2023-05-29 PROCEDURE — 25000003 PHARM REV CODE 250: Performed by: STUDENT IN AN ORGANIZED HEALTH CARE EDUCATION/TRAINING PROGRAM

## 2023-05-29 RX ORDER — AMMONIUM LACTATE 12 G/100G
LOTION TOPICAL 2 TIMES DAILY
Status: DISCONTINUED | OUTPATIENT
Start: 2023-05-29 | End: 2023-05-31 | Stop reason: HOSPADM

## 2023-05-29 RX ADMIN — METRONIDAZOLE 500 MG: 500 INJECTION, SOLUTION INTRAVENOUS at 12:05

## 2023-05-29 RX ADMIN — BARIUM SULFATE 50 ML: 0.81 POWDER, FOR SUSPENSION ORAL at 12:05

## 2023-05-29 RX ADMIN — Medication: at 10:05

## 2023-05-29 RX ADMIN — MUPIROCIN: 20 OINTMENT TOPICAL at 09:05

## 2023-05-29 RX ADMIN — PANTOPRAZOLE SODIUM 40 MG: 40 GRANULE, DELAYED RELEASE ORAL at 11:05

## 2023-05-29 RX ADMIN — MUPIROCIN: 20 OINTMENT TOPICAL at 11:05

## 2023-05-29 RX ADMIN — INSULIN ASPART 1 UNITS: 100 INJECTION, SOLUTION INTRAVENOUS; SUBCUTANEOUS at 09:05

## 2023-05-29 RX ADMIN — CEFTRIAXONE SODIUM 2 G: 2 INJECTION, POWDER, FOR SOLUTION INTRAMUSCULAR; INTRAVENOUS at 04:05

## 2023-05-29 RX ADMIN — METRONIDAZOLE 500 MG: 500 INJECTION, SOLUTION INTRAVENOUS at 04:05

## 2023-05-29 NOTE — PLAN OF CARE
0935  Updates notes sent to [facility] via [employer]. Awaiting response.       [facility] - Telemetry  Initial Discharge Assessment       Primary Care Provider: Darby Baum MD    Admission Diagnosis: Bradycardia [R00.1]  Dyspnea [R06.00]  Pleural effusion [J90]  Dysphagia [R13.10]  Chest pain [R07.9]  Hypothermia, initial encounter [T68.XXXA]  Leukopenia, unspecified type [D72.819]    Admission Date: 5/26/2023  Expected Discharge Date: 6/1/2023    Consult ENT, pulm, hem/onc, wound, & SLP    Payor: [facility] MEDICARE / Plan: [employer] MEDICARE HMO / Product Type: Capitation /     Extended Emergency Contact Information  Primary Emergency Contact: Jazmyn Blanchard   [place]  Mobile Phone: [phone]  Relation: Daughter  Secondary Emergency Contact: Hayley Velasco   [place]  Mobile Phone: [phone]  Relation: Daughter    Discharge Plan A: (P) Skilled Nursing Facility  Discharge Plan B: (P) New Nursing Home placement - halfway care facility      [facility] - [facility] [address]  [address] [address]  Phone: [phone] Fax: [phone]    [id number]  [address]  [address] [facility] [address]  [facility] [place] [address]  Phone: [phone] Fax: [phone]      Initial Assessment (most recent)       Adult Discharge Assessment - 05/29/23 1045          Discharge Assessment    Assessment Type Discharge Planning Assessment (P)      Confirmed/corrected address, phone number and insurance Yes (P)      Confirmed Demographics Correct on Facesheet (P)      Source of Information patient (P)      Communicated GABE with patient/caregiver Date not available/Unable to determine (P)      People in Home alone (P)      Facility Arrived From: [facility] (P)      Do you expect to return to your current living situation? Yes (P)      Do you have help at home or someone to  help you manage your care at home? Yes (P)      Prior to hospitilization cognitive status: Alert/Oriented (P)      Current cognitive status: Alert/Oriented (P)      Walking or Climbing Stairs stair climbing difficulty, dependent;transferring difficulty, dependent;ambulation difficulty, requires equipment (P)      Dressing/Bathing bathing difficulty, dependent;dressing difficulty, dependent (P)      Equipment Currently Used at Home walker, rolling;wheelchair;power chair;lift device (P)      Readmission within 30 days? Yes (P)      Patient currently being followed by outpatient case management? No (P)      Do you currently have service(s) that help you manage your care at home? No (P)      Do you take prescription medications? Yes (P)      Do you have prescription coverage? Yes (P)      Do you have any problems affording any of your prescribed medications? No (P)      Is the patient taking medications as prescribed? yes (P)      How do you get to doctors appointments? public transportation (P)      Are you on dialysis? No (P)      Do you take coumadin? No (P)      Discharge Plan A Skilled Nursing Facility (P)      Discharge Plan B New Nursing Home placement - [facility] care facility (P)      DME Needed Upon Discharge  other (see comments) (P)    tbd    Discharge Plan discussed with: Patient (P)         Physical Activity    On average, how many days per week do you engage in moderate to strenuous exercise (like a brisk walk)? 0 days (P)      On average, how many minutes do you engage in exercise at this level? 0 min (P)         Financial Resource Strain    How hard is it for you to pay for the very basics like food, housing, medical care, and heating? Not hard at all (P)         Housing Stability    In the last 12 months, was there a time when you were not able to pay the mortgage or rent on time? No (P)      In the last 12 months, was there a time when you did not have a steady place to sleep or slept in a shelter  (including now)? No (P)         Transportation Needs    In the past 12 months, has lack of transportation kept you from medical appointments or from getting medications? No (P)      In the past 12 months, has lack of transportation kept you from meetings, work, or from getting things needed for daily living? No (P)         Food Insecurity    Within the past 12 months, you worried that your food would run out before you got the money to buy more. Never true (P)      Within the past 12 months, the food you bought just didn't last and you didn't have money to get more. Never true (P)         Stress    Do you feel stress - tense, restless, nervous, or anxious, or unable to sleep at night because your mind is troubled all the time - these days? Not at all (P)         Social Connections    In a typical week, how many times do you talk on the phone with family, friends, or neighbors? More than three times a week (P)      How often do you get together with friends or relatives? More than three times a week (P)      How often do you attend Worship or Taoist services? Never (P)      Do you belong to any clubs or organizations such as Worship groups, unions, fraternal or athletic groups, or school groups? No (P)      How often do you attend meetings of the clubs or organizations you belong to? Never (P)      Are you , , , , never , or living with a partner?  (P)         Alcohol Use    Q1: How often do you have a drink containing alcohol? Never (P)      Q2: How many drinks containing alcohol do you have on a typical day when you are drinking? Patient does not drink (P)      Q3: How often do you have six or more drinks on one occasion? Never (P)                    1045  Patient resting quietly in bed when CM rounded. No family present. DNR order noted. Patient was admitted with a left pleural effusion & is being followed by ENT, pulm, hem/onc, wound care, & PT/OT/SLP. Pt was  hospitalized at [facility] 4/28/2023-5/5/2023 with CHF & discharged to [facility]. Pt was admitted due to c/o dysphagia. Left NG to LIWS noted. Pt scheduled to have a swallow study done by SLP today.     Prior to the pt's hospitalization in April the pt lived alone at home but had multiple family members that provided support. Pt has equipment to assist with ALDs , says she cannot walk & uses a power chair for mobility. Pt in agreement with plan to return to [facility] & will need assistance with transportation.     Previously scheduled appointments with HERMES Huizar ([facility] neph) on 6/21/2023 at 1600 & NP Darby Baum (PCP) on 6/29/2023 at 0840 noted. Information added to the pt's discharge paperwork.    CM updated patient's whiteboard with CM name & contact information.     1305  Message received from SLP Gretta stated that the swallow study has been completed & recommended pureed diet with thin liquids.     1315  Message received in [facility] that [facility] willing to accept the pt.     NG removed by nurse Aponte & order for dysphagia pureed diet order noted.       Will continue to follow.

## 2023-05-29 NOTE — ASSESSMENT & PLAN NOTE
Patient's FSGs are controlled on current medication regimen.  Last A1c reviewed-   Lab Results   Component Value Date    HGBA1C 8.1 (H) 03/23/2023     Most recent fingerstick glucose reviewed-   Recent Labs   Lab 05/28/23  1957 05/29/23  0637 05/29/23  0809 05/29/23  1125   POCTGLUCOSE 204* 179* 182* 157*     Current correctional scale  Low  Maintain anti-hyperglycemic dose as follows-   Antihyperglycemics (From admission, onward)    Start     Stop Route Frequency Ordered    05/26/23 2055  insulin aspart U-100 pen 0-5 Units         -- SubQ Before meals & nightly PRN 05/26/23 2008        Hold Oral hypoglycemics while patient is in the hospital.

## 2023-05-29 NOTE — ASSESSMENT & PLAN NOTE
Improving.    Patient with LOREN likely due to ATN.  Previously with complication of hyperkalemia, but this is improving.  Nephrology is following; appreciate

## 2023-05-29 NOTE — PLAN OF CARE
Problem: SLP  Goal: SLP Goal  Description: Short Term Goals:  1. Pt will participate in a clinical swallow eval to determine least restrictive diet.  2. Pt will safely tolerate >75% of Clear Liquids PO diet (with protein shakes and 1:1 assist as needed) with no overt s/s of aspiration.  3. Pt will orient x4 with min cueing  4. Pt will maintain wakeful state and sustain attention for >5 mins with min cueing   *further goals pending pt's progress*      Outcome: Unable to Meet, Plan Revised   MBS completed this date, pt is awake and alert. Pt had NGT in place. Pt safe for very restrictive diet of puree and thin liquids. Pt can have crushed oral meds placed in pudding texture. Secure chat sent to team with above recs.   Pt and family would benefit from Palliative Med consult to delineate goals of care and pt's disease trajectory.

## 2023-05-29 NOTE — PROGRESS NOTES
Nephrology Progress Note       Consult Requested By: Michael Harris MD  Reason for Consult: CKD4 Hyperkalemia      SUBJECTIVE:        ?    Review of Systems   Constitutional:  Negative for chills and fever.   HENT:  Positive for sore throat. Negative for congestion.    Eyes:  Negative for blurred vision, double vision and photophobia.   Respiratory:  Negative for cough and shortness of breath.    Cardiovascular:  Negative for chest pain, palpitations and leg swelling.   Gastrointestinal:  Negative for abdominal pain, diarrhea, nausea and vomiting.   Genitourinary:  Negative for dysuria and urgency.   Musculoskeletal:  Positive for back pain. Negative for joint pain and myalgias.   Skin:  Negative for itching and rash.   Neurological:  Positive for weakness. Negative for dizziness, sensory change and headaches.   Endo/Heme/Allergies:  Negative for polydipsia. Does not bruise/bleed easily.   Psychiatric/Behavioral:  Negative for depression.       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    Benign essential hypertension 11/14/2012    Cataract     s/p phacoemulsification    Chronic kidney disease (CKD), stage III (moderate) 9/12/2013    Diabetic peripheral neuropathy associated with type 2 diabetes mellitus 11/14/2014    causing right hemiparesis    Gait disorder     Hyperlipidemia     Iritis - Both Eyes 6/10/2013    Kidney stone     Lymphedema     Morbid obesity with BMI of 40.0-44.9, adult 2/18/2015    Nephrolithiasis 4/20/2016    NS (nuclear sclerosis) 4/1/2013    Nuclear sclerosis - Both Eyes 4/29/2013    Preseptal cellulitis - Right Eye 4/29/2013    Proliferative diabetic retinopathy - Both Eyes 4/29/2013    Proliferative diabetic retinopathy, both eyes 4/1/2013    PSC (posterior subcapsular cataract) - Both Eyes 4/29/2013    S/P hernia repair 12/19/2012    TIA (transient ischemic attack) 11/18/2014    Tinea pedis 7/24/2012    Tinea pedis is present on both  feet.     Type 2 diabetes mellitus with diabetic polyneuropathy, with long-term current use of insulin 9/18/2015    Type 2 diabetes mellitus with renal manifestations, controlled 12/12/2013    Type 2 diabetes, controlled, with moderate nonproliferative diabetic retinopathy without macular edema 9/17/2015    Ulcer of left lower extremity, limited to breakdown of skin 7/8/2015    Unspecified cerebral artery occlusion with cerebral infarction 11/16/2014    Unspecified venous (peripheral) insufficiency     Ureteral stone with hydronephrosis 1/27/2016    UTI (lower urinary tract infection)     Vaginal infection     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     Past Surgical History:   Procedure Laterality Date    ABLATION Bilateral 6/23/2022    Procedure: Ablation;  Surgeon: Vahid Farfan MD;  Location: [facility] CATH LAB/EP;  Service: Cardiology;  Laterality: Bilateral;    ABLATION Right 11/17/2022    Procedure: Ablation;  Surgeon: Vahid Farfan MD;  Location: [facility] CATH LAB/EP;  Service: Cardiology;  Laterality: Right;    APPENDECTOMY      CATARACT EXTRACTION W/  INTRAOCULAR LENS IMPLANT Left 5/21/2013    CATARACT EXTRACTION W/  INTRAOCULAR LENS IMPLANT Right 6/4/2013    CHOLECYSTECTOMY      COLONOSCOPY  12/22/2005    normal    COLONOSCOPY N/A 7/14/2022    Procedure: COLONOSCOPY;  Surgeon: Kannan Mace MD;  Location: [facility];  Service: Endoscopy;  Laterality: N/A;    ESOPHAGOGASTRODUODENOSCOPY  12/21/2015    hiatal hernia, Schatzki ring    ESOPHAGOGASTRODUODENOSCOPY N/A 7/13/2022    Procedure: EGD (ESOPHAGOGASTRODUODENOSCOPY);  Surgeon: Kannan Mace MD;  Location: [facility];  Service: Endoscopy;  Laterality: N/A;    EYE SURGERY Bilateral 2008    laser surgery both eyes    INTRALUMINAL GASTROINTESTINAL TRACT IMAGING VIA CAPSULE N/A 7/15/2022    Procedure: IMAGING PROCEDURE, GI TRACT, INTRALUMINAL, VIA CAPSULE;  Surgeon: Kannan Mace MD;  Location: [facility];  Service: Endoscopy;  Laterality: N/A;    NASAL  SEPTUM SURGERY      SMALL BOWEL ENTEROSCOPY N/A 2022    Procedure: ENTEROSCOPY Upper SBE;  Surgeon: Salo Frank MD;  Location: [facility];  Service: Endoscopy;  Laterality: N/A;    SUBTOTAL COLECTOMY  2012    transverse colon, for incarcerated umbilical hernia, Dr. Kat Bower     Family History   Problem Relation Age of Onset    Diabetes Sister     Cataracts Sister     Heart disease Brother     Cataracts Brother     Leukemia Mother     Cancer Neg Hx     Amblyopia Neg Hx     Blindness Neg Hx     Glaucoma Neg Hx     Hypertension Neg Hx     Macular degeneration Neg Hx     Retinal detachment Neg Hx     Strabismus Neg Hx     Stroke Neg Hx     Thyroid disease Neg Hx     Kidney disease Neg Hx      Social History     Tobacco Use    Smoking status: Former     Packs/day: 0.50     Years: 15.00     Pack years: 7.50     Types: Cigarettes     Quit date: 1982     Years since quittin.9    Smokeless tobacco: Former    Tobacco comments:     smoked one pack per week   Substance Use Topics    Alcohol use: No    Drug use: No       Review of patient's allergies indicates:   Allergen Reactions    Penicillins Hives     Other reaction(s): Hives  Patient has received cefdinir, ceftriaxone, cefazolin and cefepime in the past with no documented reactions    Sulfa (sulfonamide antibiotics) Other (See Comments)     Shakes, pt states her doctor told her the shakes were possibly caused by an allergy to sulfa            OBJECTIVE:     Vital Signs (Most Recent)  Vitals:    23 0030 23 0400 23 0554 23 0803   BP:   (!) 121/58 (!) 115/59   BP Location:    Left arm   Patient Position:    Lying   Pulse: (!) 52 62 (!) 53 (!) 50   Resp:   (!) 22 20   Temp:   97.2 °F (36.2 °C) (!) 95.6 °F (35.3 °C)   TempSrc:    Axillary   SpO2:   96% 96%   Weight:       Height:                       Medications:   cefTRIAXone (ROCEPHIN) IVPB  2 g Intravenous Q24H    metronidazole  500 mg Intravenous Q8H    mupirocin    Nasal BID    pantoprazole  40 mg Oral Daily           Physical Exam  Vitals and nursing note reviewed.   Constitutional:       General: She is not in acute distress.     Appearance: She is ill-appearing. She is not diaphoretic.   HENT:      Head: Normocephalic and atraumatic.      Mouth/Throat:      Pharynx: No oropharyngeal exudate.   Eyes:      General: No scleral icterus.     Conjunctiva/sclera: Conjunctivae normal.      Pupils: Pupils are equal, round, and reactive to light.   Cardiovascular:      Rate and Rhythm: Normal rate and regular rhythm.      Heart sounds: Normal heart sounds. No murmur heard.  Pulmonary:      Effort: Pulmonary effort is normal. No respiratory distress.      Breath sounds: Normal breath sounds.   Abdominal:      General: Bowel sounds are normal. There is no distension.      Palpations: Abdomen is soft.      Tenderness: There is no abdominal tenderness.   Musculoskeletal:         General: Normal range of motion.      Cervical back: Normal range of motion and neck supple.   Skin:     General: Skin is warm and dry.      Findings: No erythema.   Neurological:      Mental Status: She is alert.      Cranial Nerves: No cranial nerve deficit.       Laboratory:  Recent Labs   Lab 05/27/23  0440 05/28/23  0444 05/29/23  0411   WBC 1.81* 4.15 2.81*   HGB 9.7* 9.8* 9.2*   HCT 32.0* 33.0* 30.6*   PLT 89* 103* 84*   MONO 2.0*  CANCELED 9.2  0.4 8.9  0.3       Recent Labs   Lab 05/27/23  0440 05/27/23  0801 05/28/23  0444 05/28/23  0805 05/29/23  0011 05/29/23  0411 05/29/23  0749     144   < > 141  141   < > 140 140  140 141   K 5.7*  5.7*   < > 4.8  4.9   < > 3.6 3.6  3.5 3.6   *  111*   < > 105  105   < > 103 104  104 105   CO2 29  29   < > 26  26   < > 26 26  27 26   BUN 25*  26*   < > 29*  30*   < > 29* 29*  29* 29*   CREATININE 2.0*  2.0*   < > 2.9*  2.9*   < > 2.8* 2.7*  2.7* 2.7*   CALCIUM 9.1  9.1   < > 8.9  8.9   < > 8.8 8.7  8.7 8.7   PHOS 4.1  --  4.2   --   --  4.0  --     < > = values in this interval not displayed.         Diagnostic Results:  X-Ray: Reviewed  US: Reviewed  Echo: Reviewed  ASSESSMENT/PLAN:     1. CKD4  DM/HTN   -- B/L cr 2.0   -- Now  up also   Hyperkalemia  - per family discussion no agressive interventions   No dialysis. Medical management only.   Cr stable 2.7 Hyperkalemia improved   -- Daily Renal Function Panel  -- Avoid Hypotension.  -- Renally dose all meds  -- Please avoid nephrotoxins, including NSAIDs, aminoglycosides, IV contrast (unless absolutely necessary), gadolinium, fleets and other phosphorous-based laxatives. Caution with antibiotics.    Hyperkalemia    -- US kidney no obstruction   -- bladder no retention   -- Lokelma TID   shifted with Insulin - now no need   -- avoid Acidosis Hyperchloremia       Hyperkalemia resolved    Need to rule out infection on IV abx already   Lactulose to have BM helps with K+     2. HTN (I10) -  controled   3. Anemia of chronic kidney disease       Recent Labs   Lab 05/27/23 0440 05/28/23 0444 05/29/23 0411   HGB 9.7* 9.8* 9.2*   HCT 32.0* 33.0* 30.6*   PLT 89* 103* 84*         Iron   Lab Results   Component Value Date    IRON 81 05/28/2023    TIBC 284 05/28/2023    FERRITIN 119 05/28/2023       4. MBD (E88.9 M90.80) -  Recent Labs   Lab 05/29/23 0411 05/29/23  0749   CALCIUM 8.7  8.7 8.7   PHOS 4.0  --        Recent Labs   Lab 05/27/23 0440 05/28/23 0444 05/29/23 0411   MG 1.9 1.8 1.8         Lab Results   Component Value Date    .7 (H) 12/22/2022    CALCIUM 8.7 05/29/2023    CAION 1.22 12/15/2012    PHOS 4.0 05/29/2023     Lab Results   Component Value Date    EJTUSKVF77DS 18 (L) 07/06/2017       Lab Results   Component Value Date    CO2 26 05/29/2023       5. Nutrition/Hypoalbuminemia (E88.09) -    Recent Labs   Lab 05/28/23 0444 05/29/23 0411   LABPROT 11.7  --    ALBUMIN 2.2* 2.1*       Nepro with meals TID.        Thank you for allowing me to participate in care of your  patient  With any question please call   Cesar Timmons MD     [facility]  RAISA Oliver MD,   MD JAYME Car MD E. V. Harmon, NP  [id number]   [place], [address] [phone]  After hours answering service: [phone]

## 2023-05-29 NOTE — CONSULTS
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    Benign essential hypertension 11/14/2012    Cataract     s/p phacoemulsification    Chronic kidney disease (CKD), stage III (moderate) 9/12/2013    Diabetic peripheral neuropathy associated with type 2 diabetes mellitus 11/14/2014    causing right hemiparesis    Gait disorder     Hyperlipidemia     Iritis - Both Eyes 6/10/2013    Kidney stone     Lymphedema     Morbid obesity with BMI of 40.0-44.9, adult 2/18/2015    Nephrolithiasis 4/20/2016    NS (nuclear sclerosis) 4/1/2013    Nuclear sclerosis - Both Eyes 4/29/2013    Preseptal cellulitis - Right Eye 4/29/2013    Proliferative diabetic retinopathy - Both Eyes 4/29/2013    Proliferative diabetic retinopathy, both eyes 4/1/2013    PSC (posterior subcapsular cataract) - Both Eyes 4/29/2013    S/P hernia repair 12/19/2012    TIA (transient ischemic attack) 11/18/2014    Tinea pedis 7/24/2012    Tinea pedis is present on both feet.     Type 2 diabetes mellitus with diabetic polyneuropathy, with long-term current use of insulin 9/18/2015    Type 2 diabetes mellitus with renal manifestations, controlled 12/12/2013    Type 2 diabetes, controlled, with moderate nonproliferative diabetic retinopathy without macular edema 9/17/2015    Ulcer of left lower extremity, limited to breakdown of skin 7/8/2015    Unspecified cerebral artery occlusion with cerebral infarction 11/16/2014    Unspecified venous (peripheral) insufficiency     Ureteral stone with hydronephrosis 1/27/2016    UTI (lower urinary tract infection)     Vaginal infection     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       Social History     Socioeconomic History    Marital status:    Tobacco Use    Smoking status: Former     Packs/day: 0.50     Years: 15.00      Pack years: 7.50     Types: Cigarettes     Quit date: 1982     Years since quittin.9    Smokeless tobacco: Former    Tobacco comments:     smoked one pack per week   Substance and Sexual Activity    Alcohol use: No    Drug use: No    Sexual activity: Not Currently   Social History Narrative    Lives alone. Ambulatory via motorized wheelchair. Catches bus to come to [facility].      Social Determinants of Health     Financial Resource Strain: Low Risk     Difficulty of Paying Living Expenses: Not hard at all   Food Insecurity: No Food Insecurity    Worried About Running Out of Food in the Last Year: Never true    Ran Out of Food in the Last Year: Never true   Transportation Needs: No Transportation Needs    Lack of Transportation (Medical): No    Lack of Transportation (Non-Medical): No   Physical Activity: Unknown    Minutes of Exercise per Session: 0 min   Stress: No Stress Concern Present    Feeling of Stress : Only a little   Social Connections: Unknown    Frequency of Communication with Friends and Family: More than three times a week    Frequency of Social Gatherings with Friends and Family: More than three times a week    Active Member of Clubs or Organizations: No    Attends Club or Organization Meetings: Never    Marital Status:    Housing Stability: Low Risk     Unable to Pay for Housing in the Last Year: No    Number of Places Lived in the Last Year: 1    Unstable Housing in the Last Year: No       Precautions:     Allergies as of 2023 - Reviewed 2023   Allergen Reaction Noted    Penicillins Hives 2012    Sulfa (sulfonamide antibiotics) Other (See Comments) 2014       [facility] Assessment Details/Treatment     BLE- venous dermatitis- dry scaling skin- scattered scabs- mild redness and edema      Bilateral heels intact with no redness.  Sacrum/buttocks intact with no redness.  Abdomen skin folds and perineum with redness and partial thickness skin breakdown related to moisture-  pt relates to chronic skin irritation from briefs    Recommendations discussed with pt, nurse and Dr. Harris:  - Pressure injury prevention interventions   - Lac hydrin lotion to BLE BID  - Triad ointment to perineum and abdomen folds     05/29/2023

## 2023-05-29 NOTE — ASSESSMENT & PLAN NOTE
Wheelchair-bound at baseline, but transfers to chair  Subacute decline over the last 4-6 weeks per discussion with family at bedside  Unable to sit upright for full esophagram  PT and OT ordered

## 2023-05-29 NOTE — PROCEDURES
Modified Barium Swallow  Patient Education     Patient Name:  Sherin Ortiz   MRN:  [id number]      Recommendations:     Recommendations:                General Recommendations:  recommend Palliative Med consult to delineate goals of care on temporary vs pleasure feed diet as pt with odynophagia of unknown etiology. SLP suspects presphagia and presbylaryngis  Diet recommendations:  Puree, Thin    Swallow Precautions:  1. UPRIGHT for all meals at 90 degree and for all PO solid intake   2. Pureed Tray/ Thin Liquids  3. Straws ok/Cup swallows preferred to regular volume   4. Alternate sips/bites  5. Eat  slowly  6. Assist for feeding set-up   7. Crush po medications and bury in pudding   8. Close monitoring needed for any overt s/s of aspiration (coughing/choking, throat clearing, wet/gurgly vocal quality, nasal emission, watery eyes, spike in fever, reddened face, reduced O2 sats, etc)     General Precautions: Standard, fall, contact, neutropenic (NGT in place)  Communication strategies:  reorient and repeat information     Referral     Reason for Referral  Patient was referred for a Modified Barium Swallow Study to assess the efficiency of his/her swallow function, rule out aspiration and make recommendations regarding safe dietary consistencies, effective compensatory strategies, and safe eating environment.     Diagnosis: Pleural effusion, left     Per ENT laryngscopic exam on 5/27/2023:  78-year-old female with multiple medical comorbidities, deconditioning, and ongoing complaints of dysphagia/globus sensation.  Etiology likely multifaceted, including neuromuscular deconditioning, cranial nerve dysfunction, possible GI etiologies.  Upper airway patent, vocal folds mobile.     Reviewed SLP evaluation and recommendations.  Agree with plan of care for therapy as well as oral intake recommendations.  Also agree with the possibility of GI evaluation.  Patient had colonoscopy and upper endoscopy in 2022 which showed  widely patent Schatzki ring, but if symptoms have worsened since that time, may benefit from outpatient GI evaluation.    Cxr:  Left basilar airspace disease and a moderate left pleural effusion are again seen.   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    Benign essential hypertension 11/14/2012    Cataract     s/p phacoemulsification    Chronic kidney disease (CKD), stage III (moderate) 9/12/2013    Diabetic peripheral neuropathy associated with type 2 diabetes mellitus 11/14/2014    causing right hemiparesis    Gait disorder     Hyperlipidemia     Iritis - Both Eyes 6/10/2013    Kidney stone     Lymphedema     Morbid obesity with BMI of 40.0-44.9, adult 2/18/2015    Nephrolithiasis 4/20/2016    NS (nuclear sclerosis) 4/1/2013    Nuclear sclerosis - Both Eyes 4/29/2013    Preseptal cellulitis - Right Eye 4/29/2013    Proliferative diabetic retinopathy - Both Eyes 4/29/2013    Proliferative diabetic retinopathy, both eyes 4/1/2013    PSC (posterior subcapsular cataract) - Both Eyes 4/29/2013    S/P hernia repair 12/19/2012    TIA (transient ischemic attack) 11/18/2014    Tinea pedis 7/24/2012    Tinea pedis is present on both feet.     Type 2 diabetes mellitus with diabetic polyneuropathy, with long-term current use of insulin 9/18/2015    Type 2 diabetes mellitus with renal manifestations, controlled 12/12/2013    Type 2 diabetes, controlled, with moderate nonproliferative diabetic retinopathy without macular edema 9/17/2015    Ulcer of left lower extremity, limited to breakdown of skin 7/8/2015    Unspecified cerebral artery occlusion with cerebral infarction 11/16/2014    Unspecified venous (peripheral) insufficiency     Ureteral stone with hydronephrosis 1/27/2016    UTI (lower urinary tract infection)     Vaginal infection     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       Objective:     Current Respiratory Status: 05/29/23    Alert:  yes    Cooperative: inconsistent    Follows Directions: yes    Visualization  Patient was seen in the lateral view  NGT in place  C-arm was utilized for patient as she is bed bound and does not transfer  It should be noted: Pt's shoulders do slightly obstruct clear video of airway     Oral Peripheral Examination  Oral Musculature: general weakness  Dentition: edentulous  Secretion Management: problems swallowing secretions  Mucosal Quality: dry, coated tongue  Mandibular Strength and Mobility: impaired  Oral Labial Strength and Mobility: impaired seal  Lingual Strength and Mobility: impaired strength  Buccal Strength and Mobility: decreased tone  Volitional Cough: elicited (weak cough)  Volitional Swallow: VERY DELAYED SWALLOW (ORALLY AND PHARYNGEALLY)  Voice Prior to PO Intake: raspy voice, prebylaryngis    Consistencies Assessed  Thin tsp of water, cup sip of water, straw trials of water- at least 6 liquid trials done   Puree tsp bite of pudding x2  Soft solids diced peaches were attempted, juice drained, 1 piece of peach coated with barium paste   Patient refusing trials of solids/estuardo cracker and initially refused diced peach. Pt need coaxing to participate in MBS and continue with study.     Oral Preparation/Oral Phase  MODERATE ORAL DYSPHAGIA  Reduced bolus formation and control  prolonged A-P transfer  prolonged mastication  Oral residue present post swallow  Decreased base of tongue mobility  Premature spillage    Pharyngeal Phase   MODERATE pharyngeal dysphagia is noted marked by delayed swallow, premature loss of bolus into vallecula each instance with some overflow into pyriform sinus space, pt with poor awareness of residuals in pharynx, pt with delayed initiation of 2nd swallow and needed coaching cues to initiate additional dry swallows before next presentation was given.   Pt with reduced tongue base retraction, reduced hyolaryngeal lift, fair glottic closure as NO airway penetration or invasion  "past level of vocal cords. Pt with slow epiglottic inversion on each swallow and fatigue was noted as pt continued with PO trials. Study was terminated as patient stated she could not swallow any more PO trials.     Cervical Esophageal Phase  Decreased UES opening  Unable to view further esophageal space given pt's position and use of c- arm     Assessment:     Impressions   Moderate oral and pharyngeal dysphagia is noted as aforementioned above    Prognosis: Poor    Barriers:  Fatigue  Cognitive status  Poor intake  Dependent feeding      Education  Results were discussed with patient.  Secure chat was sent to MD, NP, RN and CM on treatment team.   SLP did educate pt following study and discussed highly restrictive diet at this time per MBS results. No confirmation of understanding was received by patient. She reported that she was "tired"     Goals:   Multidisciplinary Problems       SLP Goals          Problem: SLP    Goal Priority Disciplines Outcome   SLP Goal     SLP Unable to Meet, Plan Revised   Description: Short Term Goals:  1. Pt will participate in a clinical swallow eval to determine least restrictive diet.  2. Pt will safely tolerate >75% of Clear Liquids PO diet (with protein shakes and 1:1 assist as needed) with no overt s/s of aspiration.  3. Pt will orient x4 with min cueing  4. Pt will maintain wakeful state and sustain attention for >5 mins with min cueing   *further goals pending pt's progress*                           Plan:     Plan of Care expires:  23  Plan of Care reviewed with:  patient        Discharge recommendations:   (low intensity of therapy, rec: Palliative consult)   Barriers to Discharge:  none    Time Tracking:   SLP Treatment Date:   23  Speech Start Time:  1208  Speech Stop Time:  1236     Self Care/Home Management [phone]  Speech Total Time (min):  28 min    2023  "

## 2023-05-29 NOTE — PT/OT/SLP PROGRESS
Physical Therapy      Patient Name:  Sherin Ortiz   MRN:  [id number]    Patient not seen today secondary to Other (Comment) (Pt GERMAN for testing/imaging in diagnostic radiology). Will follow-up as able.    5/29/2023

## 2023-05-29 NOTE — ASSESSMENT & PLAN NOTE
Improving   Hyperkalemia in the setting of LOREN.  Patient had peak potassium was 6.4 requiring aggressive medical management in the inpatient setting.   She is no longer requiring bicarbonate infusion, Lokelma or Lasix  Decrease renal function to q.12 hours

## 2023-05-29 NOTE — ASSESSMENT & PLAN NOTE
Reports of dysphagia for several weeks prior to presentation.  ENT evaluation including flexible laryngoscopy was relatively unrevealing.    Remove NG    modified barium swallow today   upper GI fluoroscopy and EGD afterwards if warranted.

## 2023-05-29 NOTE — ASSESSMENT & PLAN NOTE
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

## 2023-05-29 NOTE — PROGRESS NOTES
Pharmacokinetic Assessment Follow Up: IV Vancomycin    Vancomycin serum concentration assessment(s):    The random level was drawn correctly and can be used to guide therapy at this time. The measurement is within the desired definitive target range of 15 to 20 mcg/mL.    Vancomycin Regimen Plan:    Vancomycin pulse dosing (15 mg per kg) 1750 mg ivpb x 1  Vancomycin random to be drawn 05/29/2023 at 2100    Drug levels (last 3 results):  Recent Labs   Lab Result Units 05/28/23  1807   Vancomycin, Random ug/mL 15.2       Pharmacy will continue to follow and monitor vancomycin.    Please contact pharmacy at extension [phone] for questions regarding this assessment.    Thank you for the consult,   Jessenia Dodson       Patient brief summary:  Sherin Ortiz is a 78 y.o. female initiated on antimicrobial therapy with IV Vancomycin for treatment of  pneumonia    The patient's current regimen is vancomycin pulse dosing     Drug Allergies:   Review of patient's allergies indicates:   Allergen Reactions    Penicillins Hives     Other reaction(s): Hives  Patient has received cefdinir, ceftriaxone, cefazolin and cefepime in the past with no documented reactions    Sulfa (sulfonamide antibiotics) Other (See Comments)     Eyad, pt states her doctor told her the shakes were possibly caused by an allergy to sulfa       Actual Body Weight:   119.1 kg    Renal Function:   Estimated Creatinine Clearance: 20.7 mL/min (A) (based on SCr of 2.9 mg/dL (H)).,     Dialysis Method (if applicable):  N/A    CBC (last 72 hours):  Recent Labs   Lab Result Units 05/26/23  1656 05/27/23  0440 05/28/23  0444   WBC K/uL 1.86* 1.81* 4.15   Hemoglobin g/dL 11.1* 9.7* 9.8*   Hematocrit % 38.1 32.0* 33.0*   Platelets K/uL 82* 89* 103*   Gran % % 79.0* 77.0* 69.4   Lymph % % 15.0* 21.0 21.0   Mono % % 6.0 2.0* 9.2   Eosinophil % % 0.0 0.0 0.2   Basophil % % 0.0 0.0 0.0   Differential Method  Manual Automated Automated       Metabolic Panel  (last 72 hours):  Recent Labs   Lab Result Units 05/26/23  1656 05/27/23  0038 05/27/23  0440 05/27/23  0801 05/27/23  1251 05/27/23  1318 05/27/23  1808 05/27/23 2010 05/28/23  0008 05/28/23  0444 05/28/23  0805 05/28/23  1156 05/28/23  1540 05/28/23  1949   Sodium mmol/L 143 143 144  144 144 143  --  141 142 142 141  141 143 143 141 141   Potassium mmol/L 5.3* 5.5* 5.7*  5.7* 5.9* 6.4*  --  5.2* 5.2* 5.2* 4.8  4.9 4.5 4.1 4.1 3.8   Chloride mmol/L 110 111* 111*  111* 111* 110  --  110 108 106 105  105 105 104 106 104   CO2 mmol/L 27 28 29  29 29 23  --  24 27 24 26  26 26 29 26 27   Glucose mg/dL 91 99 72  71 48* 72  --  165* 181* 224* 223*  221* 217* 210* 203* 198*   Glucose, UA   --   --   --   --   --  1+*  1+*  --   --   --   --   --   --   --   --    BUN mg/dL 25* 27* 25*  26* 28* 28*  --  29* 28* 30* 29*  30* 30* 29* 29* 29*   Creatinine mg/dL 1.9* 1.9* 2.0*  2.0* 2.0* 2.3*  --  2.5* 2.7* 2.8* 2.9*  2.9* 2.8* 2.9* 2.9* 2.9*   Albumin g/dL 2.5*  --  2.2*  --   --   --   --   --   --  2.2*  --   --   --   --    Total Bilirubin mg/dL 0.4  --  0.3  --   --   --   --   --   --  0.4  --   --   --   --    Alkaline Phosphatase U/L 197*  --  168*  --   --   --   --   --   --  175*  --   --   --   --    AST U/L 42*  --  42*  --   --   --   --   --   --  53*  --   --   --   --    ALT U/L 33  --  31  --   --   --   --   --   --  30  --   --   --   --    Magnesium mg/dL  --   --  1.9  --   --   --   --   --   --  1.8  --   --   --   --    Phosphorus mg/dL  --   --  4.1  --   --   --   --   --   --  4.2  --   --   --   --        Vancomycin Administrations:  vancomycin given in the last 96 hours                     vancomycin (VANCOCIN) 1,750 mg in dextrose 5 % (D5W) 500 mL IVPB (mg) 1,750 mg New Bag 05/28/23 2055    vancomycin 2 g in dextrose 5 % 500 mL IVPB (mg) 2,000 mg New Bag 05/27/23 6976                    Microbiologic Results:  Microbiology Results (last 7 days)       Procedure Component Value  Units Date/Time    Blood culture x two cultures. Draw prior to antibiotics. [[id number]] Collected: 05/26/23 2057    Order Status: Completed Specimen: Blood from Peripheral, Forearm, Right Updated: 05/28/23 0613     Blood Culture, Routine No Growth to date      No Growth to date    Narrative:      Aerobic and anaerobic    Blood culture x two cultures. Draw prior to antibiotics. [[id number]] Collected: 05/26/23 2040    Order Status: Completed Specimen: Blood from Peripheral, Forearm, Right Updated: 05/28/23 0613     Blood Culture, Routine No Growth to date      No Growth to date    Narrative:      Aerobic and anaerobic

## 2023-05-29 NOTE — SUBJECTIVE & OBJECTIVE
Interval History:  Removing NG today.  Patient is going for modified barium swallow today.  She is unable to fully sit upright to undergo full esophagram.  PT and OT ordered.  Patient and her family wish to host informational meeting with hospice services while inpatient.    Review of Systems   Unable to perform ROS: Mental status change   Objective:     Vital Signs (Most Recent):  Temp: 96.4 °F (35.8 °C) (05/29/23 1129)  Pulse: (!) 50 (05/29/23 1129)  Resp: 20 (05/29/23 1129)  BP: 131/60 (05/29/23 1129)  SpO2: 97 % (05/29/23 1129) Vital Signs (24h Range):  Temp:  [95.6 °F (35.3 °C)-97.9 °F (36.6 °C)] 96.4 °F (35.8 °C)  Pulse:  [46-62] 50  Resp:  [18-22] 20  SpO2:  [96 %-97 %] 97 %  BP: (102-131)/(52-60) 131/60     Weight: 119.1 kg (262 lb 9.1 oz)  Body mass index is 41.12 kg/m².    Intake/Output Summary (Last 24 hours) at 5/29/2023 1152  Last data filed at 5/29/2023 0643  Gross per 24 hour   Intake 480 ml   Output 700 ml   Net -220 ml           Physical Exam  Vitals reviewed.   Constitutional:       General: She is not in acute distress.     Appearance: She is obese. She is ill-appearing.   HENT:      Head: Normocephalic.   Eyes:      General:         Right eye: No discharge.         Left eye: No discharge.   Neck:      Comments: Trachea midline.  Cardiovascular:      Rate and Rhythm: Normal rate and regular rhythm.   Pulmonary:      Breath sounds: No wheezing or rales.      Comments: Decreased breath sounds of the left lower and left mid lung fields  Abdominal:      General: There is no distension.      Tenderness: There is no abdominal tenderness.   Musculoskeletal:      Right lower leg: Edema present.      Left lower leg: Edema present.   Skin:     General: Skin is warm.      Capillary Refill: Capillary refill takes less than 2 seconds.      Comments: Chronic skin changes of bilateral lower extremities.   Neurological:      Comments: Localizes.  Drowsy, but opens eyes to stimuli.  Intermittently follows  commands.  Moves all 4 extremities.           Significant Labs: All pertinent labs within the past 24 hours have been reviewed.  CBC:   Recent Labs   Lab 05/28/23 0444 05/29/23 0411   WBC 4.15 2.81*   HGB 9.8* 9.2*   HCT 33.0* 30.6*   * 84*       CMP:   Recent Labs   Lab 05/28/23 0444 05/28/23  0805 05/29/23  0011 05/29/23 0411 05/29/23  0749     141   < > 140 140  140 141   K 4.8  4.9   < > 3.6 3.6  3.5 3.6     105   < > 103 104  104 105   CO2 26  26   < > 26 26  27 26   *  221*   < > 224* 188*  189* 167*   BUN 29*  30*   < > 29* 29*  29* 29*   CREATININE 2.9*  2.9*   < > 2.8* 2.7*  2.7* 2.7*   CALCIUM 8.9  8.9   < > 8.8 8.7  8.7 8.7   PROT 6.1  --   --  5.7*  --    ALBUMIN 2.2*  --   --  2.1*  --    BILITOT 0.4  --   --  0.4  --    ALKPHOS 175*  --   --  159*  --    AST 53*  --   --  60*  --    ALT 30  --   --  31  --    ANIONGAP 10  10   < > 11 10  9 10    < > = values in this interval not displayed.         Significant Imaging: I have reviewed all pertinent imaging results/findings within the past 24 hours.

## 2023-05-29 NOTE — ASSESSMENT & PLAN NOTE
Improving after initiating empiric antibiotics    Discontinue neutropenic precautions   Hematology is following   Repeat CBC  Follow CBC and smear sent for pathology review

## 2023-05-29 NOTE — PLAN OF CARE
VN note: progress notes, labs and vital signs reviewed. Will be available to intervene if needed.     Problem: Adult Inpatient Plan of Care  Goal: Plan of Care Review  Outcome: Ongoing, Progressing

## 2023-05-29 NOTE — PROGRESS NOTES
[facility]  Progress Note    Patient Name: Sherin Ortiz  MRN: [id number]  Patient Class: IP- Inpatient   Admission Date: 5/26/2023  Length of Stay: 1 days  Attending Physician: Michael Harris MD  Primary Care Provider: Darby Baum MD        Subjective:     Principal Problem:Pleural effusion, left        HPI:  Sherin Ortiz is an 78-year-old female with PMH HTN, DMT2, HLD, debility, HFpEF, obesity, TIA, CKD stage 4, and PVD.  She was brought in from her nursing home for evaluation of difficulty swallowing for the past month.  She states that she can swallow pills and food but it feels like it is stuck in her throat.  She denies coughing or choking with swallowing.  However, she does report an occasional productive cough with thick white sputum and SOB.  She denies CP, N/V/D, abdominal pain, fever, chills, or trouble speaking.  Patient is a poor historian, spoke with her daughter via telephone who confirms the above history.          Overview/Hospital Course:  78-year-old woman with complaints of neck pain, difficulty swallowing, and progressive decline over the last 4-6 weeks.  She initially presented to the hospital for dysphagia and was found to have large left loculated pleural effusion, pancytopenia, hyperglycemia and hypothermia concerning for underlying infective process. She was started on empiric antibiotics with improvement in her pancytopenia and mental status.  Her pleural effusion was evaluated by pulmonology, but appears to be simple effusion on ultrasound imaging at bedside.  Patient's hyperkalemia peaked and started to improve.  Her mental status started to improve after empiric antibiotics.  Patient still complains of globus sensation and dysphagia.  Currently planning to undergo barium swallow, esophagram, and if necessary will consult GI for EGD.      Interval History:  Removing NG today.  Patient is going for modified barium swallow today.  She is unable to  fully sit upright to undergo full esophagram.  PT and OT ordered.  Patient and her family wish to host informational meeting with hospice services while inpatient.    Review of Systems   Unable to perform ROS: Mental status change   Objective:     Vital Signs (Most Recent):  Temp: 96.4 °F (35.8 °C) (05/29/23 1129)  Pulse: (!) 50 (05/29/23 1129)  Resp: 20 (05/29/23 1129)  BP: 131/60 (05/29/23 1129)  SpO2: 97 % (05/29/23 1129) Vital Signs (24h Range):  Temp:  [95.6 °F (35.3 °C)-97.9 °F (36.6 °C)] 96.4 °F (35.8 °C)  Pulse:  [46-62] 50  Resp:  [18-22] 20  SpO2:  [96 %-97 %] 97 %  BP: (102-131)/(52-60) 131/60     Weight: 119.1 kg (262 lb 9.1 oz)  Body mass index is 41.12 kg/m².    Intake/Output Summary (Last 24 hours) at 5/29/2023 1152  Last data filed at 5/29/2023 0643  Gross per 24 hour   Intake 480 ml   Output 700 ml   Net -220 ml           Physical Exam  Vitals reviewed.   Constitutional:       General: She is not in acute distress.     Appearance: She is obese. She is ill-appearing.   HENT:      Head: Normocephalic.   Eyes:      General:         Right eye: No discharge.         Left eye: No discharge.   Neck:      Comments: Trachea midline.  Cardiovascular:      Rate and Rhythm: Normal rate and regular rhythm.   Pulmonary:      Breath sounds: No wheezing or rales.      Comments: Decreased breath sounds of the left lower and left mid lung fields  Abdominal:      General: There is no distension.      Tenderness: There is no abdominal tenderness.   Musculoskeletal:      Right lower leg: Edema present.      Left lower leg: Edema present.   Skin:     General: Skin is warm.      Capillary Refill: Capillary refill takes less than 2 seconds.      Comments: Chronic skin changes of bilateral lower extremities.   Neurological:      Comments: Localizes.  Drowsy, but opens eyes to stimuli.  Intermittently follows commands.  Moves all 4 extremities.           Significant Labs: All pertinent labs within the past 24 hours have been  reviewed.  CBC:   Recent Labs   Lab 05/28/23  0444 05/29/23  0411   WBC 4.15 2.81*   HGB 9.8* 9.2*   HCT 33.0* 30.6*   * 84*       CMP:   Recent Labs   Lab 05/28/23  0444 05/28/23  0805 05/29/23  0011 05/29/23  0411 05/29/23  0749     141   < > 140 140  140 141   K 4.8  4.9   < > 3.6 3.6  3.5 3.6     105   < > 103 104  104 105   CO2 26  26   < > 26 26  27 26   *  221*   < > 224* 188*  189* 167*   BUN 29*  30*   < > 29* 29*  29* 29*   CREATININE 2.9*  2.9*   < > 2.8* 2.7*  2.7* 2.7*   CALCIUM 8.9  8.9   < > 8.8 8.7  8.7 8.7   PROT 6.1  --   --  5.7*  --    ALBUMIN 2.2*  --   --  2.1*  --    BILITOT 0.4  --   --  0.4  --    ALKPHOS 175*  --   --  159*  --    AST 53*  --   --  60*  --    ALT 30  --   --  31  --    ANIONGAP 10  10   < > 11 10  9 10    < > = values in this interval not displayed.         Significant Imaging: I have reviewed all pertinent imaging results/findings within the past 24 hours.      Assessment/Plan:      * Pleural effusion, left  Large left pleural effusion, initially thought to be infective focus or secondary to chronic aspiration.  On bedside ultrasound, appears to be simple left pleural effusion.  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 The patient wishes to have a natural, peaceful death.  Along those lines, the patient does not wish to have CPR or other invasive treatments performed when her heart and/or breathing stops. I communicated to the patient that a DNR order would be placed in her medical record to reflect this preference.  I spent a total of 6 minutes engaging the patient in this advance care planning discussion.    LaPOST in paper chart.  Picture uploaded into media     Patient's family will continue to address goals of care with the patient.  We will continue an open discussion regarding patient's desires for further care and tentative plans for home hospice upon discharge.    Hospice consult placed for information session with the patient and daughter.        Venous insufficiency of both lower extremities  Stable.  Wound Care consulted      (HFpEF) heart failure with preserved ejection fraction  Does not appear to be in exacerbation   Continue to monitor  Hold oral diuretics      Dermatitis, stasis        Hyperkalemia  Improving   Hyperkalemia in the setting of LOREN.  Patient had peak potassium was 6.4 requiring aggressive medical management in the inpatient setting.   She is no longer requiring bicarbonate infusion, Lokelma or Lasix  Decrease renal function to q.12 hours      Type 2 diabetes mellitus with diabetic polyneuropathy, with long-term current use of insulin  Patient's FSGs are controlled on current medication regimen.  Last A1c reviewed-   Lab Results   Component Value Date    HGBA1C 8.1 (H) 03/23/2023     Most recent fingerstick glucose reviewed-   Recent Labs   Lab 05/28/23  1957 05/29/23  0637 05/29/23  0809 05/29/23  1125   POCTGLUCOSE 204* 179* 182* 157*     Current correctional scale  Low  Maintain anti-hyperglycemic dose as follows-   Antihyperglycemics (From admission, onward)      Start     Stop Route Frequency Ordered    05/26/23 2055  insulin aspart U-100 pen 0-5 Units         -- SubQ Before meals & nightly  PRN 05/26/23 2008          Hold Oral hypoglycemics while patient is in the hospital.    LOREN (acute kidney injury)  Improving.    Patient with LOREN likely due to ATN.  Previously with complication of hyperkalemia, but this is improving.  Nephrology is following; appreciate    Dysphagia  Reports of dysphagia for several weeks prior to presentation.  ENT evaluation including flexible laryngoscopy was relatively unrevealing.    Remove NG    modified barium swallow today   upper GI fluoroscopy and EGD afterwards if warranted.      Debility  Wheelchair-bound at baseline, but transfers to chair  Subacute decline over the last 4-6 weeks per discussion with family at bedside  Unable to sit upright for full esophagram  PT and OT ordered         Essential hypertension  Hold lisinopril   Hold amlodipine      Hyperlipidemia LDL goal <100  Hold statin for now      VTE Risk Mitigation (From admission, onward)           Ordered     Reason for No Pharmacological VTE Prophylaxis  Once        Question:  Reasons:  Answer:  Thrombocytopenia    05/26/23 2008     IP VTE HIGH RISK PATIENT  Once         05/26/23 2008     Place sequential compression device  Until discontinued         05/26/23 2008                    Discharge Planning   GABE:      Code Status: DNR   Is the patient medically ready for discharge?:     Reason for patient still in hospital (select all that apply): Laboratory test, Treatment and Consult recommendations                     Michael Harris MD  Department of Hospital Medicine   [facility]

## 2023-05-29 NOTE — ASSESSMENT & PLAN NOTE
Advance Care Planning     Date: 05/26/2023    Code Status  In light of the patients advanced and life limiting illness,I engaged the the patient in a conversation about the patient's preferences for care  at the very end of life. The patient wishes to have a natural, peaceful death.  Along those lines, the patient does not wish to have CPR or other invasive treatments performed when her heart and/or breathing stops. I communicated to the patient that a DNR order would be placed in her medical record to reflect this preference.  I spent a total of 6 minutes engaging the patient in this advance care planning discussion.    LaPOST in paper chart.  Picture uploaded into media     Patient's family will continue to address goals of care with the patient.  We will continue an open discussion regarding patient's desires for further care and tentative plans for home hospice upon discharge.    Hospice consult placed for information session with the patient and daughter.

## 2023-05-29 NOTE — NURSING
Shift assessment complete. Pt is alert and oriented to person, place, and time. Pt changed and purwick replaced. NG tube to left nare. Area clean, dry, intact. Bed in lowest position, locked, and side rails up x 3. Bed alarm on. Call light in reach.

## 2023-05-29 NOTE — ASSESSMENT & PLAN NOTE
Large left pleural effusion, initially thought to be infective focus or secondary to chronic aspiration.  On bedside ultrasound, appears to be simple left pleural effusion.  Pulmonology is following   No indication for thoracentesis at this time   Continuing ceftriaxone and metronidazole

## 2023-05-30 LAB
ANION GAP SERPL CALC-SCNC: 10 MMOL/L (ref 8–16)
ANION GAP SERPL CALC-SCNC: 9 MMOL/L (ref 8–16)
BASOPHILS # BLD AUTO: 0.01 K/UL (ref 0–0.2)
BASOPHILS NFR BLD: 0.4 % (ref 0–1.9)
BUN SERPL-MCNC: 28 MG/DL (ref 8–23)
BUN SERPL-MCNC: 29 MG/DL (ref 8–23)
CALCIUM SERPL-MCNC: 8.9 MG/DL (ref 8.7–10.5)
CALCIUM SERPL-MCNC: 8.9 MG/DL (ref 8.7–10.5)
CHLORIDE SERPL-SCNC: 103 MMOL/L (ref 95–110)
CHLORIDE SERPL-SCNC: 104 MMOL/L (ref 95–110)
CO2 SERPL-SCNC: 26 MMOL/L (ref 23–29)
CO2 SERPL-SCNC: 29 MMOL/L (ref 23–29)
CREAT SERPL-MCNC: 2.4 MG/DL (ref 0.5–1.4)
CREAT SERPL-MCNC: 2.5 MG/DL (ref 0.5–1.4)
DIFFERENTIAL METHOD: ABNORMAL
EOSINOPHIL # BLD AUTO: 0 K/UL (ref 0–0.5)
EOSINOPHIL NFR BLD: 1.3 % (ref 0–8)
ERYTHROCYTE [DISTWIDTH] IN BLOOD BY AUTOMATED COUNT: 18 % (ref 11.5–14.5)
EST. GFR  (NO RACE VARIABLE): 19 ML/MIN/1.73 M^2
EST. GFR  (NO RACE VARIABLE): 20 ML/MIN/1.73 M^2
GLUCOSE SERPL-MCNC: 173 MG/DL (ref 70–110)
GLUCOSE SERPL-MCNC: 207 MG/DL (ref 70–110)
HCT VFR BLD AUTO: 30.4 % (ref 37–48.5)
HGB BLD-MCNC: 9.4 G/DL (ref 12–16)
IMM GRANULOCYTES # BLD AUTO: 0 K/UL (ref 0–0.04)
IMM GRANULOCYTES NFR BLD AUTO: 0 % (ref 0–0.5)
LYMPHOCYTES # BLD AUTO: 0.4 K/UL (ref 1–4.8)
LYMPHOCYTES NFR BLD: 17.9 % (ref 18–48)
MCH RBC QN AUTO: 25.1 PG (ref 27–31)
MCHC RBC AUTO-ENTMCNC: 30.9 G/DL (ref 32–36)
MCV RBC AUTO: 81 FL (ref 82–98)
MONOCYTES # BLD AUTO: 0.2 K/UL (ref 0.3–1)
MONOCYTES NFR BLD: 9.9 % (ref 4–15)
NEUTROPHILS # BLD AUTO: 1.6 K/UL (ref 1.8–7.7)
NEUTROPHILS NFR BLD: 70.5 % (ref 38–73)
NRBC BLD-RTO: 1 /100 WBC
PLATELET # BLD AUTO: 81 K/UL (ref 150–450)
PMV BLD AUTO: 11.3 FL (ref 9.2–12.9)
POCT GLUCOSE: 182 MG/DL (ref 70–110)
POCT GLUCOSE: 207 MG/DL (ref 70–110)
POCT GLUCOSE: 248 MG/DL (ref 70–110)
POTASSIUM SERPL-SCNC: 3.5 MMOL/L (ref 3.5–5.1)
POTASSIUM SERPL-SCNC: 3.6 MMOL/L (ref 3.5–5.1)
RBC # BLD AUTO: 3.74 M/UL (ref 4–5.4)
SODIUM SERPL-SCNC: 140 MMOL/L (ref 136–145)
SODIUM SERPL-SCNC: 141 MMOL/L (ref 136–145)
WBC # BLD AUTO: 2.23 K/UL (ref 3.9–12.7)

## 2023-05-30 PROCEDURE — 25000003 PHARM REV CODE 250: Performed by: STUDENT IN AN ORGANIZED HEALTH CARE EDUCATION/TRAINING PROGRAM

## 2023-05-30 PROCEDURE — 97535 SELF CARE MNGMENT TRAINING: CPT

## 2023-05-30 PROCEDURE — 11000001 HC ACUTE MED/SURG PRIVATE ROOM

## 2023-05-30 PROCEDURE — 97165 OT EVAL LOW COMPLEX 30 MIN: CPT

## 2023-05-30 PROCEDURE — 97161 PT EVAL LOW COMPLEX 20 MIN: CPT

## 2023-05-30 PROCEDURE — 97530 THERAPEUTIC ACTIVITIES: CPT

## 2023-05-30 PROCEDURE — 36415 COLL VENOUS BLD VENIPUNCTURE: CPT | Performed by: STUDENT IN AN ORGANIZED HEALTH CARE EDUCATION/TRAINING PROGRAM

## 2023-05-30 PROCEDURE — 97110 THERAPEUTIC EXERCISES: CPT

## 2023-05-30 PROCEDURE — 63600175 PHARM REV CODE 636 W HCPCS: Performed by: STUDENT IN AN ORGANIZED HEALTH CARE EDUCATION/TRAINING PROGRAM

## 2023-05-30 PROCEDURE — 85025 COMPLETE CBC W/AUTO DIFF WBC: CPT | Performed by: STUDENT IN AN ORGANIZED HEALTH CARE EDUCATION/TRAINING PROGRAM

## 2023-05-30 PROCEDURE — 80048 BASIC METABOLIC PNL TOTAL CA: CPT | Mod: 91 | Performed by: STUDENT IN AN ORGANIZED HEALTH CARE EDUCATION/TRAINING PROGRAM

## 2023-05-30 PROCEDURE — 25000003 PHARM REV CODE 250: Performed by: FAMILY MEDICINE

## 2023-05-30 PROCEDURE — S0030 INJECTION, METRONIDAZOLE: HCPCS | Performed by: STUDENT IN AN ORGANIZED HEALTH CARE EDUCATION/TRAINING PROGRAM

## 2023-05-30 RX ORDER — FOLIC ACID 1 MG/1
1 TABLET ORAL DAILY
Status: DISCONTINUED | OUTPATIENT
Start: 2023-05-30 | End: 2023-05-31 | Stop reason: HOSPADM

## 2023-05-30 RX ORDER — HYDROCODONE BITARTRATE AND ACETAMINOPHEN 5; 325 MG/1; MG/1
1 TABLET ORAL EVERY 6 HOURS PRN
Status: DISCONTINUED | OUTPATIENT
Start: 2023-05-30 | End: 2023-05-31 | Stop reason: HOSPADM

## 2023-05-30 RX ADMIN — METRONIDAZOLE 500 MG: 500 INJECTION, SOLUTION INTRAVENOUS at 05:05

## 2023-05-30 RX ADMIN — CEFTRIAXONE SODIUM 2 G: 2 INJECTION, POWDER, FOR SOLUTION INTRAMUSCULAR; INTRAVENOUS at 04:05

## 2023-05-30 RX ADMIN — PANTOPRAZOLE SODIUM 40 MG: 40 GRANULE, DELAYED RELEASE ORAL at 08:05

## 2023-05-30 RX ADMIN — MUPIROCIN: 20 OINTMENT TOPICAL at 08:05

## 2023-05-30 RX ADMIN — METRONIDAZOLE 500 MG: 500 INJECTION, SOLUTION INTRAVENOUS at 08:05

## 2023-05-30 RX ADMIN — Medication: at 08:05

## 2023-05-30 RX ADMIN — METRONIDAZOLE 500 MG: 500 INJECTION, SOLUTION INTRAVENOUS at 01:05

## 2023-05-30 RX ADMIN — Medication: at 09:05

## 2023-05-30 RX ADMIN — INSULIN ASPART 1 UNITS: 100 INJECTION, SOLUTION INTRAVENOUS; SUBCUTANEOUS at 09:05

## 2023-05-30 RX ADMIN — INSULIN ASPART 2 UNITS: 100 INJECTION, SOLUTION INTRAVENOUS; SUBCUTANEOUS at 06:05

## 2023-05-30 RX ADMIN — MUPIROCIN: 20 OINTMENT TOPICAL at 09:05

## 2023-05-30 RX ADMIN — FOLIC ACID 1 MG: 1 TABLET ORAL at 02:05

## 2023-05-30 NOTE — PT/OT/SLP EVAL
Physical Therapy Evaluation and Treatment    Patient Name:  Sherin Ortiz   MRN:  [id number]    Recommendations:     Discharge Recommendations: nursing facility, skilled (return to SNF (moderate intensity therapy))   Discharge Equipment Recommendations: to be determined by next level of care   Barriers to discharge: None    Assessment:     Sherin Ortiz is a 78 y.o. female admitted with a medical diagnosis of Pleural effusion, left.  She presents with the following impairments/functional limitations: weakness, gait instability, impaired balance, impaired endurance, impaired self care skills, impaired functional mobility, impaired sensation, decreased lower extremity function, decreased upper extremity function, impaired coordination, decreased coordination, edema, impaired skin, decreased ROM, decreased safety awareness, impaired joint extensibility, impaired fine motor .Pt requires TotalA x 2 for bed mobility. Pt sat EOB ~25 mins with ModA progressed to CGA with assist and cueing due to posterior lean. Recommending return to SNF (moderate intensity therapy).     Rehab Prognosis: Fair; patient would benefit from acute skilled PT services to address these deficits and reach maximum level of function.    Recent Surgery: * No surgery found *      Plan:     During this hospitalization, patient to be seen 3 x/week to address the identified rehab impairments via therapeutic activities, therapeutic exercises, neuromuscular re-education, wheelchair management/training and progress toward the following goals:    Plan of Care Expires:  06/30/23    Subjective     Chief Complaint: reports air mattress is uncomfortable   Patient/Family Comments/goals: none stated  Pain/Comfort:  Pain Rating 1: 0/10    Patients cultural, spiritual, Yazidi conflicts given the current situation: no    Living Environment:  Pt admitted from SNF; reports she was being michele lifted into/out of bed to w/c; it was taking 2 therapists to attempt  to pick her up in standing frame in gym; requires assist all ADLs ; pt is non-ambulatory at baseline    PT Evaluation on 4/29/23: Patient lives alone in a single story home with no steps to enter. She is modified independent and is in a power wheelchair most of the time. She is able to perform bed mobility and standing transfers without assistance. She has family that lives nearby.     Equipment used at home: walker, rolling, wheelchair, power chair, lift device, bedside commode. Upon discharge, patient will have assistance from NH staff.    Objective:     Communicated with nsg prior to session.  Patient found HOB elevated with Adtuitiveck, telemetry  upon PT entry to room.    General Precautions: Standard, fall  Orthopedic Precautions:N/A   Braces: N/A  Respiratory Status: Room air    Exams:  Cognitive Exam:  Patient is oriented to Person, Place, Situation, and month/year; impaired insight, mild confusion, impaired memory  Postural Exam:  Patient presented with the following abnormalities:    -       Rounded shoulders  -       Forward head  Sensation:    -       Impaired   B hands and feet/lower legs  Skin Integrity/Edema:      -       Skin integrity: Abdomen skin folds, R hip, and perineum with redness and partial thickness skin breakdown; BLE venous dermatitis dry scaling skin, scattered scabs, mild redness  -       Edema: Pitting BLE  BLE ROM: WFL except ankle to ~neutral  BLE Strength: hip grossly 2-/5, knee ext/flex grossly 2/5, and ankle DF/PF 1/5    Functional Mobility:  Bed Mobility:     Rolling Left:  maximal assistance and total assistance  Rolling Right: maximal assistance and total assistance  Scooting: total assistance and of 2 persons  Supine to Sit: total assistance and of 2 persons  Sit to Supine: total assistance and of 2 persons      AM-PAC 6 CLICK MOBILITY  Total Score:6       Treatment & Education:  Pt requires TotalA x 2 for bed mobility.   Pt requires Total A for scooting forward EOB  Pt sat EOB  ~25 mins with ModA progressed to CGA with assist and cueing due to posterior lean.   Performed X 10 reps AAROM LAQs and hip flexion / marches  Deferred attempt to stand / scoot due to safety concerns  Pt returned supine and scooted HOB  B rolling performed to place waffle overlay and pt educated on importance of pressure relief and turning schedule; pt refused to have air mattress inflated despite education  Deflated mattress and nurse Maggie notified   BLE elevated on pillow with heels floated.         Patient left HOB elevated with all lines intact, call button in reach, bed alarm on, and nsg notified.    GOALS:   Multidisciplinary Problems       Physical Therapy Goals          Problem: Physical Therapy    Goal Priority Disciplines Outcome Goal Variances Interventions   Physical Therapy Goal     PT, PT/OT Ongoing, Progressing     Description: Goals to be met by: 23     Patient will increase functional independence with mobility by performin. Supine to sit with Moderate Assistance  2. Sit to supine with Moderate Assistance  3. Rolling to Left and Right with Moderate Assistance.  4. Sit to stand transfer with Moderate Assistance  5. Bed to wheelchair transfer with Moderate Assistance via stand or scoot pivot with use of appropriate AD.                          History:     Past Medical History:   Diagnosis Date    Allergy     Asteroid hyalosis - Left Eye 2013    Benign essential hypertension 2012    Cataract     s/p phacoemulsification    Chronic kidney disease (CKD), stage III (moderate) 2013    Diabetic peripheral neuropathy associated with type 2 diabetes mellitus 2014    causing right hemiparesis    Gait disorder     Hyperlipidemia     Iritis - Both Eyes 6/10/2013    Kidney stone     Lymphedema     Morbid obesity with BMI of 40.0-44.9, adult 2015    Nephrolithiasis 2016    NS (nuclear sclerosis) 2013    Nuclear sclerosis - Both Eyes 2013    Preseptal cellulitis  - Right Eye 4/29/2013    Proliferative diabetic retinopathy - Both Eyes 4/29/2013    Proliferative diabetic retinopathy, both eyes 4/1/2013    PSC (posterior subcapsular cataract) - Both Eyes 4/29/2013    S/P hernia repair 12/19/2012    TIA (transient ischemic attack) 11/18/2014    Tinea pedis 7/24/2012    Tinea pedis is present on both feet.     Type 2 diabetes mellitus with diabetic polyneuropathy, with long-term current use of insulin 9/18/2015    Type 2 diabetes mellitus with renal manifestations, controlled 12/12/2013    Type 2 diabetes, controlled, with moderate nonproliferative diabetic retinopathy without macular edema 9/17/2015    Ulcer of left lower extremity, limited to breakdown of skin 7/8/2015    Unspecified cerebral artery occlusion with cerebral infarction 11/16/2014    Unspecified venous (peripheral) insufficiency     Ureteral stone with hydronephrosis 1/27/2016    UTI (lower urinary tract infection)     Vaginal infection     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       Past Surgical History:   Procedure Laterality Date    ABLATION Bilateral 6/23/2022    Procedure: Ablation;  Surgeon: Vahid Farfan MD;  Location: [facility] CATH LAB/EP;  Service: Cardiology;  Laterality: Bilateral;    ABLATION Right 11/17/2022    Procedure: Ablation;  Surgeon: Vahid Farfan MD;  Location: [facility] CATH LAB/EP;  Service: Cardiology;  Laterality: Right;    APPENDECTOMY      CATARACT EXTRACTION W/  INTRAOCULAR LENS IMPLANT Left 5/21/2013    CATARACT EXTRACTION W/  INTRAOCULAR LENS IMPLANT Right 6/4/2013    CHOLECYSTECTOMY      COLONOSCOPY  12/22/2005    normal    COLONOSCOPY N/A 7/14/2022    Procedure: COLONOSCOPY;  Surgeon: Kannan Mace MD;  Location: [facility];  Service: Endoscopy;  Laterality: N/A;    ESOPHAGOGASTRODUODENOSCOPY  12/21/2015    hiatal hernia, Schatzki ring    ESOPHAGOGASTRODUODENOSCOPY N/A 7/13/2022    Procedure: EGD (ESOPHAGOGASTRODUODENOSCOPY);  Surgeon: Kannan Mace MD;  Location: [facility];   Service: Endoscopy;  Laterality: N/A;    EYE SURGERY Bilateral 2008    laser surgery both eyes    INTRALUMINAL GASTROINTESTINAL TRACT IMAGING VIA CAPSULE N/A 7/15/2022    Procedure: IMAGING PROCEDURE, GI TRACT, INTRALUMINAL, VIA CAPSULE;  Surgeon: Kannan Mace MD;  Location: [facility] ENDO;  Service: Endoscopy;  Laterality: N/A;    NASAL SEPTUM SURGERY      SMALL BOWEL ENTEROSCOPY N/A 7/18/2022    Procedure: ENTEROSCOPY Upper SBE;  Surgeon: aSlo Frank MD;  Location: [facility] ENDO;  Service: Endoscopy;  Laterality: N/A;    SUBTOTAL COLECTOMY  12/13/2012    transverse colon, for incarcerated umbilical hernia, Dr. Kat Bower       Time Tracking:     PT Received On: 05/30/23  PT Start Time: 0931     PT Stop Time: 1011  PT Total Time (min): 40 min     Billable Minutes: Evaluation 10, Therapeutic Activity 20, and Therapeutic Exercise 10 (cotx with OT)      05/30/2023

## 2023-05-30 NOTE — PROGRESS NOTES
[facility]                   Pharmacy  Pharmacy Vancomycin/AG Sign-off    Therapy with vancomycin completed and/or consult discontinued by provider.  Pharmacy will sign off, please re-consult as needed. Thank you for allowing us to participate in this patient's care.     Danny Law, PharmD    [phone]

## 2023-05-30 NOTE — ASSESSMENT & PLAN NOTE
Advance Care Planning     Date: 05/26/2023    Code Status  In light of the patients advanced and life limiting illness,I engaged the the patient in a conversation about the patient's preferences for care  at the very end of life. The patient wishes to have a natural, peaceful death.  Along those lines, the patient does not wish to have CPR or other invasive treatments performed when her heart and/or breathing stops. I communicated to the patient that a DNR order would be placed in her medical record to reflect this preference.  I spent a total of 6 minutes engaging the patient in this advance care planning discussion.    LaPOST in paper chart.  Picture uploaded into media     Patient's family will continue to address goals of care with the patient.  We will continue an open discussion regarding patient's desires for further care and tentative plans for home hospice upon discharge.    Hospice consult placed for information session with the patient and daughter.      Advance Care Planning     Date: 05/30/2023    [facility]  I engaged the family in a conversation about advance care planning and we specifically addressed what the goals of care would be moving forward, in light of the patient's change in clinical status, specifically progressive physical and cognitive decline.  We did specifically address the patient's likely prognosis, which is fair .  We explored the patient's values and preferences for future care.  The family endorses that what is most important right now is to focus on remaining as independent as possible, extending life as long as possible, even it it means sacrificing quality and improvement in condition but with limits to invasive therapies    Accordingly, we have decided that the best plan to meet the patient's goals includes continuing with treatment    I did explain the role for hospice care at this stage of the patient's illness, including its ability to help the patient live with the best quality  of life possible.  We will not be making a hospice referral.    I spent a total of 20 minutes engaging the patient in this advance care planning discussion.

## 2023-05-30 NOTE — PROGRESS NOTES
Pharmacokinetic Assessment Follow Up: IV Vancomycin    Vancomycin serum concentration assessment(s):    The random level was drawn correctly and can be used to guide therapy at this time. The measurement is above the desired definitive target range of 15 to 20 mcg/mL.    Vancomycin Regimen Plan:    Re-dose when the random level is less than 20 mcg/mL, next level to be drawn at 5/30 on 2100    Drug levels (last 3 results):  Recent Labs   Lab Result Units 05/28/23  1807 05/29/23  2101   Vancomycin, Random ug/mL 15.2 24.3       Pharmacy will continue to follow and monitor vancomycin.    Please contact pharmacy at extension [phone] for questions regarding this assessment.    Thank you for the consult,   Justin Dominguez       Patient brief summary:  Sherin Ortiz is a 78 y.o. female initiated on antimicrobial therapy with IV Vancomycin for treatment of lower respiratory infection    The patient's current regimen is pulse dose     Drug Allergies:   Review of patient's allergies indicates:   Allergen Reactions    Penicillins Hives     Other reaction(s): Hives  Patient has received cefdinir, ceftriaxone, cefazolin and cefepime in the past with no documented reactions    Sulfa (sulfonamide antibiotics) Other (See Comments)     Eyad, pt states her doctor told her the shakes were possibly caused by an allergy to sulfa       Actual Body Weight:   119kg    Renal Function:   Estimated Creatinine Clearance: 23 mL/min (A) (based on SCr of 2.6 mg/dL (H)).,     Dialysis Method (if applicable):  N/A    CBC (last 72 hours):  Recent Labs   Lab Result Units 05/27/23  0440 05/28/23  0444 05/29/23  0411   WBC K/uL 1.81* 4.15 2.81*   Hemoglobin g/dL 9.7* 9.8* 9.2*   Hematocrit % 32.0* 33.0* 30.6*   Platelets K/uL 89* 103* 84*   Gran % % 77.0* 69.4 74.7*   Lymph % % 21.0 21.0 15.3*   Mono % % 2.0* 9.2 8.9   Eosinophil % % 0.0 0.2 0.7   Basophil % % 0.0 0.0 0.0   Differential Method  Automated Automated Automated       Metabolic Panel (last  72 hours):  Recent Labs   Lab Result Units 05/27/23  0038 05/27/23  0440 05/27/23  0801 05/27/23  1251 05/27/23  1318 05/27/23  1808 05/27/23 2010 05/28/23  0008 05/28/23  0444 05/28/23  0805 05/28/23  1156 05/28/23  1540 05/28/23  1949 05/29/23  0011 05/29/23  0411 05/29/23  0749 05/29/23  1950   Sodium mmol/L 143 144  144 144 143  --  141 142 142 141  141 143 143 141 141 140 140  140 141 140   Potassium mmol/L 5.5* 5.7*  5.7* 5.9* 6.4*  --  5.2* 5.2* 5.2* 4.8  4.9 4.5 4.1 4.1 3.8 3.6 3.6  3.5 3.6 3.9   Chloride mmol/L 111* 111*  111* 111* 110  --  110 108 106 105  105 105 104 106 104 103 104  104 105 104   CO2 mmol/L 28 29  29 29 23  --  24 27 24 26  26 26 29 26 27 26 26  27 26 25   Glucose mg/dL 99 72  71 48* 72  --  165* 181* 224* 223*  221* 217* 210* 203* 198* 224* 188*  189* 167* 219*   Glucose, UA   --   --   --   --  1+*  1+*  --   --   --   --   --   --   --   --   --   --   --   --    BUN mg/dL 27* 25*  26* 28* 28*  --  29* 28* 30* 29*  30* 30* 29* 29* 29* 29* 29*  29* 29* 29*   Creatinine mg/dL 1.9* 2.0*  2.0* 2.0* 2.3*  --  2.5* 2.7* 2.8* 2.9*  2.9* 2.8* 2.9* 2.9* 2.9* 2.8* 2.7*  2.7* 2.7* 2.6*   Albumin g/dL  --  2.2*  --   --   --   --   --   --  2.2*  --   --   --   --   --  2.1*  --   --    Total Bilirubin mg/dL  --  0.3  --   --   --   --   --   --  0.4  --   --   --   --   --  0.4  --   --    Alkaline Phosphatase U/L  --  168*  --   --   --   --   --   --  175*  --   --   --   --   --  159*  --   --    AST U/L  --  42*  --   --   --   --   --   --  53*  --   --   --   --   --  60*  --   --    ALT U/L  --  31  --   --   --   --   --   --  30  --   --   --   --   --  31  --   --    Magnesium mg/dL  --  1.9  --   --   --   --   --   --  1.8  --   --   --   --   --  1.8  --   --    Phosphorus mg/dL  --  4.1  --   --   --   --   --   --  4.2  --   --   --   --   --  4.0  --   --        Vancomycin Administrations:  vancomycin given in the last 96 hours                      vancomycin (VANCOCIN) 1,750 mg in dextrose 5 % (D5W) 500 mL IVPB (mg) 1,750 mg New Bag 05/28/23 2055    vancomycin 2 g in dextrose 5 % 500 mL IVPB (mg) 2,000 mg New Bag 05/27/23 1824                    Microbiologic Results:  Microbiology Results (last 7 days)       Procedure Component Value Units Date/Time    Blood culture x two cultures. Draw prior to antibiotics. [[id number]] Collected: 05/26/23 2057    Order Status: Completed Specimen: Blood from Peripheral, Forearm, Right Updated: 05/29/23 0613     Blood Culture, Routine No Growth to date      No Growth to date      No Growth to date    Narrative:      Aerobic and anaerobic    Blood culture x two cultures. Draw prior to antibiotics. [[id number]] Collected: 05/26/23 2040    Order Status: Completed Specimen: Blood from Peripheral, Forearm, Right Updated: 05/29/23 0613     Blood Culture, Routine No Growth to date      No Growth to date      No Growth to date    Narrative:      Aerobic and anaerobic

## 2023-05-30 NOTE — PROGRESS NOTES
[facility] Medicine  Progress Note    Patient Name: Sherin Ortiz  MRN: [id number]  Patient Class: IP- Inpatient   Admission Date: 5/26/2023  Length of Stay: 2 days  Attending Physician: Su Maddox*  Primary Care Provider: Darby Baum MD        Subjective:     Principal Problem:Pleural effusion, left        HPI:  Sherin Ortiz is an 78-year-old female with PMH HTN, DMT2, HLD, debility, HFpEF, obesity, TIA, CKD stage 4, and PVD.  She was brought in from her nursing home for evaluation of difficulty swallowing for the past month.  She states that she can swallow pills and food but it feels like it is stuck in her throat.  She denies coughing or choking with swallowing.  However, she does report an occasional productive cough with thick white sputum and SOB.  She denies CP, N/V/D, abdominal pain, fever, chills, or trouble speaking.  Patient is a poor historian, spoke with her daughter via telephone who confirms the above history.          Overview/Hospital Course:  78-year-old woman with complaints of neck pain, difficulty swallowing, and progressive decline over the last 4-6 weeks.  She initially presented to the hospital for dysphagia and was found to have large left loculated pleural effusion, pancytopenia, hyperglycemia and hypothermia concerning for underlying infective process. She was started on empiric antibiotics with improvement in her pancytopenia and mental status.  Her pleural effusion was evaluated by pulmonology, but appears to be simple effusion on ultrasound imaging at bedside.  Patient's hyperkalemia peaked and started to improve.  Her mental status started to improve after empiric antibiotics.  Patient still complains of globus sensation and dysphagia.  Currently planning to undergo barium swallow, esophagram, and if necessary will consult GI for EGD.      Interval History:   Awake and alert, no new complaint  Discussed with patient's daughter by bedside  regarding goals of care-she noted family wants to pursue rehab for now and if patient unable to tolerate therapy family will consider hospice at that point.  Patient was initially admitted to [facility] held for SNF- will discharge in a  Consider folate supplement.      Review of Systems   Constitutional:  Negative for chills and fever.   HENT:  Negative for dental problem and drooling.    Respiratory:  Negative for cough, choking, shortness of breath, wheezing and stridor.    Cardiovascular:  Negative for chest pain.   Gastrointestinal:  Negative for abdominal pain, diarrhea, nausea and vomiting.   Genitourinary:  Negative for decreased urine volume.   Musculoskeletal:  Positive for gait problem (Wheelchair-bound). Negative for neck pain and neck stiffness.   Skin:  Positive for wound (Venous stasis discoloration/wound).   Neurological:  Negative for dizziness, speech difficulty, light-headedness and headaches.   Psychiatric/Behavioral:  Negative for agitation and confusion. The patient is not nervous/anxious.    Objective:     Vital Signs (Most Recent):  Temp: 97.3 °F (36.3 °C) (05/30/23 0732)  Pulse: (!) 49 (05/30/23 0732)  Resp: 18 (05/30/23 0732)  BP: 133/61 (05/30/23 0732)  SpO2: 98 % (05/30/23 0732) Vital Signs (24h Range):  Temp:  [95.4 °F (35.2 °C)-97.5 °F (36.4 °C)] 97.3 °F (36.3 °C)  Pulse:  [41-53] 49  Resp:  [16-20] 18  SpO2:  [96 %-98 %] 98 %  BP: (122-133)/(57-64) 133/61     Weight: 119.1 kg (262 lb 9.1 oz)  Body mass index is 41.12 kg/m².    Intake/Output Summary (Last 24 hours) at 5/30/2023 1003  Last data filed at 5/30/2023 0718  Gross per 24 hour   Intake --   Output 300 ml   Net -300 ml         Physical Exam  Vitals and nursing note reviewed.   Constitutional:       General: She is not in acute distress.     Appearance: She is obese. She is ill-appearing. She is not toxic-appearing.   HENT:      Head: Normocephalic and atraumatic.   Neck:      Thyroid: Thyromegaly present.      Trachea: No tracheal  deviation.   Cardiovascular:      Rate and Rhythm: Regular rhythm. Bradycardia present.      Pulses:           Dorsalis pedis pulses are 1+ on the right side and 1+ on the left side.      Heart sounds: Normal heart sounds.   Pulmonary:      Effort: Pulmonary effort is normal. No respiratory distress.      Breath sounds: No wheezing.   Abdominal:      General: Bowel sounds are normal. There is no distension.      Palpations: Abdomen is soft.      Tenderness: There is no abdominal tenderness. There is no rebound.   Musculoskeletal:      Cervical back: Normal range of motion.      Right lower leg: Tenderness present. 1+ Edema present.      Left lower leg: Tenderness present. 1+ Edema present.   Lymphadenopathy:      Cervical: No cervical adenopathy.   Skin:     General: Skin is warm.      Findings: Wound present.      Comments: Discoloration BLE; small abrasion/wound w/ pink base RLE - no drainage or warmth noted   Neurological:      Mental Status: She is alert and oriented to person, place, and time.   Psychiatric:         Mood and Affect: Mood normal.         Behavior: Behavior normal.         Thought Content: Thought content normal.           Significant Labs: A1C:   Recent Labs   Lab 03/23/23  1418   HGBA1C 8.1*     ABGs: No results for input(s): PH, PCO2, HCO3, POCSATURATED, BE, TOTALHB, COHB, METHB, O2HB, POCFIO2, PO2 in the last 48 hours.  Blood Culture: No results for input(s): LABBLOO in the last 48 hours.  CBC:   Recent Labs   Lab 05/29/23  0411 05/30/23  0318   WBC 2.81* 2.23*   HGB 9.2* 9.4*   HCT 30.6* 30.4*   PLT 84* 81*     CMP:   Recent Labs   Lab 05/29/23  0411 05/29/23  0749 05/29/23  1950 05/30/23  0757     140 141 140 141   K 3.6  3.5 3.6 3.9 3.6     104 105 104 103   CO2 26  27 26 25 29   *  189* 167* 219* 173*   BUN 29*  29* 29* 29* 28*   CREATININE 2.7*  2.7* 2.7* 2.6* 2.5*   CALCIUM 8.7  8.7 8.7 8.5* 8.9   PROT 5.7*  --   --   --    ALBUMIN 2.1*  --   --   --     BILITOT 0.4  --   --   --    ALKPHOS 159*  --   --   --    AST 60*  --   --   --    ALT 31  --   --   --    ANIONGAP 10  9 10 11 9     Coagulation: No results for input(s): PT, INR, APTT in the last 48 hours.  Lipase: No results for input(s): LIPASE in the last 48 hours.  Lipid Panel: No results for input(s): CHOL, HDL, LDLCALC, TRIG, CHOLHDL in the last 48 hours.  Troponin: No results for input(s): TROPONINI, TROPONINIHS in the last 48 hours.  TSH:   Recent Labs   Lab 05/26/23 2046   TSH 3.930     Urine Culture: No results for input(s): LABURIN in the last 48 hours.  Urine Studies: No results for input(s): COLORU, APPEARANCEUA, PHUR, SPECGRAV, PROTEINUA, GLUCUA, KETONESU, BILIRUBINUA, OCCULTUA, NITRITE, UROBILINOGEN, LEUKOCYTESUR, RBCUA, WBCUA, BACTERIA, SQUAMEPITHEL, HYALINECASTS in the last 48 hours.    Invalid input(s): WRIGHTSUR    Significant Imaging: I have reviewed all pertinent imaging results/findings within the past 24 hours.      Assessment/Plan:      * Pleural effusion, left  Large left pleural effusion, initially thought to be infective focus or secondary to chronic aspiration.  On bedside ultrasound, appears to be simple left pleural effusion.  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 The patient wishes to have a natural, peaceful death.  Along those lines, the patient does not wish to have CPR or other invasive treatments performed when her heart and/or breathing stops. I communicated to the patient that a DNR order would be placed in her medical record to reflect this preference.  I spent a total of 6 minutes engaging the patient in this advance care planning discussion.    LaPOST in paper chart.  Picture uploaded into media     Patient's family will continue to address goals of care with the patient.  We will continue an open discussion regarding patient's desires for further care and tentative plans for home hospice upon discharge.    Hospice consult placed for information session with the patient and daughter.      Advance Care Planning     Date: 05/30/2023    [facility]  I engaged the family in a conversation about advance care planning and we specifically addressed what the goals of care would be moving forward, in light of the patient's change in clinical status, specifically progressive physical and cognitive decline.  We did specifically address the patient's likely prognosis, which is fair .  We explored the patient's values and preferences for future care.  The family endorses that what is most important right now is to focus on remaining as independent as possible, extending life as long as possible, even it it means sacrificing quality and improvement in condition but with limits to invasive therapies    Accordingly, we have decided that the best plan to meet the patient's goals includes continuing with treatment    I did explain the role for hospice care at this stage of the patient's illness, including its ability to help the patient live with the best quality of life possible.  We will not be making a hospice referral.    I spent a total of 20 minutes engaging the patient in this advance care planning discussion.               Venous insufficiency of both lower  extremities  Stable.  Wound Care consulted      (HFpEF) heart failure with preserved ejection fraction  Does not appear to be in exacerbation   Continue to monitor  Hold oral diuretics      Dermatitis, stasis        Hyperkalemia  Improving   Hyperkalemia in the setting of LOREN.  Patient had peak potassium was 6.4 requiring aggressive medical management in the inpatient setting.   She is no longer requiring bicarbonate infusion, Lokelma or Lasix  Decrease renal function to q.12 hours      Type 2 diabetes mellitus with diabetic polyneuropathy, with long-term current use of insulin  Patient's FSGs are controlled on current medication regimen.  Last A1c reviewed-   Lab Results   Component Value Date    HGBA1C 8.1 (H) 03/23/2023     Most recent fingerstick glucose reviewed-   Recent Labs   Lab 05/28/23  1957 05/29/23  0637 05/29/23  0809 05/29/23  1125   POCTGLUCOSE 204* 179* 182* 157*     Current correctional scale  Low  Maintain anti-hyperglycemic dose as follows-   Antihyperglycemics (From admission, onward)    Start     Stop Route Frequency Ordered    05/26/23 2055  insulin aspart U-100 pen 0-5 Units         -- SubQ Before meals & nightly PRN 05/26/23 2008        Hold Oral hypoglycemics while patient is in the hospital.    LOREN (acute kidney injury)  Improving.    Patient with LOREN likely due to ATN.  Previously with complication of hyperkalemia, but this is improving.  Nephrology is following; appreciate    Dysphagia  Reports of dysphagia for several weeks prior to presentation.  ENT evaluation including flexible laryngoscopy was relatively unrevealing.    Remove NG    modified barium swallow today   upper GI fluoroscopy and EGD afterwards if warranted.      Debility  Wheelchair-bound at baseline, but transfers to chair  Subacute decline over the last 4-6 weeks per discussion with family at bedside  Unable to sit upright for full esophagram  PT and OT ordered         Essential hypertension  Hold lisinopril   Hold  amlodipine      Hyperlipidemia LDL goal <100  Hold statin for now      VTE Risk Mitigation (From admission, onward)         Ordered     Reason for No Pharmacological VTE Prophylaxis  Once        Question:  Reasons:  Answer:  Thrombocytopenia    05/26/23 2008     IP VTE HIGH RISK PATIENT  Once         05/26/23 2008     Place sequential compression device  Until discontinued         05/26/23 2008                Discharge Planning   GABE: 6/1/2023     Code Status: DNR   Is the patient medically ready for discharge?:     Reason for patient still in hospital (select all that apply): Patient trending condition  Discharge Plan A: Skilled Nursing Facility                  Su Maddox MD  Department of [facility] Medicine   [facility]

## 2023-05-30 NOTE — PLAN OF CARE
0847  [facility]. Updated notes sent via [facility].     1350  Pt resting quietly in bed with daughter, Hayley Velasco ([phone]), at the bedside when CM rounded. NG removed & diet advanced to pureed yesterday. Confirmation received from Hayley that the pt is to discharge back to [facility] when medically stable to discharge. Daughter requested a call from the MD with an update. CM informed Dr Bautista of above.     1440  CM was informed by Dr Bautista that the pt will be medically stable to discharge back to [facility] tomorrow. Updated notes including PT/OT notes sent to [facility] via [facility] informing of the pt's discharge status & requesting that insurance authorization be submitted. Awaiting response.     Voicemail message left for Hannah ([phone]) w/[facility] informing of above.       Will continue to follow.

## 2023-05-30 NOTE — PLAN OF CARE
Problem: Adult Inpatient Plan of Care  Goal: Plan of Care Review  Outcome: Ongoing, Progressing     Problem: Adult Inpatient Plan of Care  Goal: Optimal Comfort and Wellbeing  Outcome: Ongoing, Progressing     Problem: Diabetes Comorbidity  Goal: Blood Glucose Level Within Targeted Range  Outcome: Ongoing, Progressing     Problem: Renal Function Impairment (Acute Kidney Injury/Impairment)  Goal: Effective Renal Function  Outcome: Ongoing, Progressing     Problem: Swallowing Impairment  Goal: Optimal Eating and Swallowing Without Aspiration  Outcome: Ongoing, Progressing     .Plan of care reviewed with the patient. Scheduled medicines given and the patient tolerated well. Fall and safety precautions taken and the standard interventions are in place. Applied Triad ointment to groin & Rt thigh. No acute distress reported in the shift. Patient's Accu Check was 235 mg/dl, covered with 1 Unit Insulin. Advised the patient to call for the assistance. Continued monitoring the patient.

## 2023-05-30 NOTE — SUBJECTIVE & OBJECTIVE
Interval History:   Awake and alert, no new complaint  Discussed with patient's daughter by bedside regarding goals of care-she noted family wants to pursue rehab for now and if patient unable to tolerate therapy family will consider hospice at that point.  Patient was initially admitted to [facility] held for SNF- will discharge in a  Consider folate supplement.      Review of Systems   Constitutional:  Negative for chills and fever.   HENT:  Negative for dental problem and drooling.    Respiratory:  Negative for cough, choking, shortness of breath, wheezing and stridor.    Cardiovascular:  Negative for chest pain.   Gastrointestinal:  Negative for abdominal pain, diarrhea, nausea and vomiting.   Genitourinary:  Negative for decreased urine volume.   Musculoskeletal:  Positive for gait problem (Wheelchair-bound). Negative for neck pain and neck stiffness.   Skin:  Positive for wound (Venous stasis discoloration/wound).   Neurological:  Negative for dizziness, speech difficulty, light-headedness and headaches.   Psychiatric/Behavioral:  Negative for agitation and confusion. The patient is not nervous/anxious.    Objective:     Vital Signs (Most Recent):  Temp: 97.3 °F (36.3 °C) (05/30/23 0732)  Pulse: (!) 49 (05/30/23 0732)  Resp: 18 (05/30/23 0732)  BP: 133/61 (05/30/23 0732)  SpO2: 98 % (05/30/23 0732) Vital Signs (24h Range):  Temp:  [95.4 °F (35.2 °C)-97.5 °F (36.4 °C)] 97.3 °F (36.3 °C)  Pulse:  [41-53] 49  Resp:  [16-20] 18  SpO2:  [96 %-98 %] 98 %  BP: (122-133)/(57-64) 133/61     Weight: 119.1 kg (262 lb 9.1 oz)  Body mass index is 41.12 kg/m².    Intake/Output Summary (Last 24 hours) at 5/30/2023 1003  Last data filed at 5/30/2023 0718  Gross per 24 hour   Intake --   Output 300 ml   Net -300 ml         Physical Exam  Vitals and nursing note reviewed.   Constitutional:       General: She is not in acute distress.     Appearance: She is obese. She is ill-appearing. She is not toxic-appearing.   HENT:      Head:  Normocephalic and atraumatic.   Neck:      Thyroid: Thyromegaly present.      Trachea: No tracheal deviation.   Cardiovascular:      Rate and Rhythm: Regular rhythm. Bradycardia present.      Pulses:           Dorsalis pedis pulses are 1+ on the right side and 1+ on the left side.      Heart sounds: Normal heart sounds.   Pulmonary:      Effort: Pulmonary effort is normal. No respiratory distress.      Breath sounds: No wheezing.   Abdominal:      General: Bowel sounds are normal. There is no distension.      Palpations: Abdomen is soft.      Tenderness: There is no abdominal tenderness. There is no rebound.   Musculoskeletal:      Cervical back: Normal range of motion.      Right lower leg: Tenderness present. 1+ Edema present.      Left lower leg: Tenderness present. 1+ Edema present.   Lymphadenopathy:      Cervical: No cervical adenopathy.   Skin:     General: Skin is warm.      Findings: Wound present.      Comments: Discoloration BLE; small abrasion/wound w/ pink base RLE - no drainage or warmth noted   Neurological:      Mental Status: She is alert and oriented to person, place, and time.   Psychiatric:         Mood and Affect: Mood normal.         Behavior: Behavior normal.         Thought Content: Thought content normal.           Significant Labs: A1C:   Recent Labs   Lab 03/23/23  1418   HGBA1C 8.1*     ABGs: No results for input(s): PH, PCO2, HCO3, POCSATURATED, BE, TOTALHB, COHB, METHB, O2HB, POCFIO2, PO2 in the last 48 hours.  Blood Culture: No results for input(s): LABBLOO in the last 48 hours.  CBC:   Recent Labs   Lab 05/29/23  0411 05/30/23  0318   WBC 2.81* 2.23*   HGB 9.2* 9.4*   HCT 30.6* 30.4*   PLT 84* 81*     CMP:   Recent Labs   Lab 05/29/23  0411 05/29/23  0749 05/29/23  1950 05/30/23  0757     140 141 140 141   K 3.6  3.5 3.6 3.9 3.6     104 105 104 103   CO2 26  27 26 25 29   *  189* 167* 219* 173*   BUN 29*  29* 29* 29* 28*   CREATININE 2.7*  2.7* 2.7* 2.6* 2.5*    CALCIUM 8.7  8.7 8.7 8.5* 8.9   PROT 5.7*  --   --   --    ALBUMIN 2.1*  --   --   --    BILITOT 0.4  --   --   --    ALKPHOS 159*  --   --   --    AST 60*  --   --   --    ALT 31  --   --   --    ANIONGAP 10  9 10 11 9     Coagulation: No results for input(s): PT, INR, APTT in the last 48 hours.  Lipase: No results for input(s): LIPASE in the last 48 hours.  Lipid Panel: No results for input(s): CHOL, HDL, LDLCALC, TRIG, CHOLHDL in the last 48 hours.  Troponin: No results for input(s): TROPONINI, TROPONINIHS in the last 48 hours.  TSH:   Recent Labs   Lab 05/26/23 2046   TSH 3.930     Urine Culture: No results for input(s): LABURIN in the last 48 hours.  Urine Studies: No results for input(s): COLORU, APPEARANCEUA, PHUR, SPECGRAV, PROTEINUA, GLUCUA, KETONESU, BILIRUBINUA, OCCULTUA, NITRITE, UROBILINOGEN, LEUKOCYTESUR, RBCUA, WBCUA, BACTERIA, SQUAMEPITHEL, HYALINECASTS in the last 48 hours.    Invalid input(s): MIRYAM    Significant Imaging: I have reviewed all pertinent imaging results/findings within the past 24 hours.

## 2023-05-30 NOTE — PLAN OF CARE
Problem: Occupational Therapy  Goal: Occupational Therapy Goal  Description: Goals to be met by: 6/30/23     Patient will increase functional independence with ADLs by performing:    Feeding with Modified [place].  Grooming while seated with Modified [place].  Sitting at edge of bed x10 minutes with Modified [place].  Rolling to Bilateral with Minimal Assistance.   Supine to sit with Minimal Assistance.  Squat pivot transfers with Maximum Assistance.  Toilet transfer to bedside commode with Maximum Assistance.  Increased functional strength to WFL for self care skills and functional mobility .    Outcome: Ongoing, Progressing     Pt would benefit from cont OT services in order to maximize functional independence. Recommending return to NH with prior level of care at d/c.

## 2023-05-30 NOTE — PROGRESS NOTES
Nephrology Progress Note       Consult Requested By: Su Maddox*  Reason for Consult: CKD4 Hyperkalemia      SUBJECTIVE:        ?    Review of Systems   Constitutional:  Negative for chills and fever.   HENT:  Positive for sore throat. Negative for congestion.    Eyes:  Negative for blurred vision, double vision and photophobia.   Respiratory:  Negative for cough and shortness of breath.    Cardiovascular:  Negative for chest pain, palpitations and leg swelling.   Gastrointestinal:  Negative for abdominal pain, diarrhea, nausea and vomiting.   Genitourinary:  Negative for dysuria and urgency.   Musculoskeletal:  Positive for back pain. Negative for joint pain and myalgias.   Skin:  Negative for itching and rash.   Neurological:  Positive for weakness. Negative for dizziness, sensory change and headaches.   Endo/Heme/Allergies:  Negative for polydipsia. Does not bruise/bleed easily.   Psychiatric/Behavioral:  Negative for depression.       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    Benign essential hypertension 11/14/2012    Cataract     s/p phacoemulsification    Chronic kidney disease (CKD), stage III (moderate) 9/12/2013    Diabetic peripheral neuropathy associated with type 2 diabetes mellitus 11/14/2014    causing right hemiparesis    Gait disorder     Hyperlipidemia     Iritis - Both Eyes 6/10/2013    Kidney stone     Lymphedema     Morbid obesity with BMI of 40.0-44.9, adult 2/18/2015    Nephrolithiasis 4/20/2016    NS (nuclear sclerosis) 4/1/2013    Nuclear sclerosis - Both Eyes 4/29/2013    Preseptal cellulitis - Right Eye 4/29/2013    Proliferative diabetic retinopathy - Both Eyes 4/29/2013    Proliferative diabetic retinopathy, both eyes 4/1/2013    PSC (posterior subcapsular cataract) - Both Eyes 4/29/2013    S/P hernia repair 12/19/2012    TIA (transient ischemic attack) 11/18/2014    Tinea pedis 7/24/2012    Tinea pedis is present on  both feet.     Type 2 diabetes mellitus with diabetic polyneuropathy, with long-term current use of insulin 9/18/2015    Type 2 diabetes mellitus with renal manifestations, controlled 12/12/2013    Type 2 diabetes, controlled, with moderate nonproliferative diabetic retinopathy without macular edema 9/17/2015    Ulcer of left lower extremity, limited to breakdown of skin 7/8/2015    Unspecified cerebral artery occlusion with cerebral infarction 11/16/2014    Unspecified venous (peripheral) insufficiency     Ureteral stone with hydronephrosis 1/27/2016    UTI (lower urinary tract infection)     Vaginal infection     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     Past Surgical History:   Procedure Laterality Date    ABLATION Bilateral 6/23/2022    Procedure: Ablation;  Surgeon: Vahid Farfna MD;  Location: [facility] CATH LAB/EP;  Service: Cardiology;  Laterality: Bilateral;    ABLATION Right 11/17/2022    Procedure: Ablation;  Surgeon: Vahid Farfan MD;  Location: [facility] CATH LAB/EP;  Service: Cardiology;  Laterality: Right;    APPENDECTOMY      CATARACT EXTRACTION W/  INTRAOCULAR LENS IMPLANT Left 5/21/2013    CATARACT EXTRACTION W/  INTRAOCULAR LENS IMPLANT Right 6/4/2013    CHOLECYSTECTOMY      COLONOSCOPY  12/22/2005    normal    COLONOSCOPY N/A 7/14/2022    Procedure: COLONOSCOPY;  Surgeon: Kannan Mace MD;  Location: [facility];  Service: Endoscopy;  Laterality: N/A;    ESOPHAGOGASTRODUODENOSCOPY  12/21/2015    hiatal hernia, Schatzki ring    ESOPHAGOGASTRODUODENOSCOPY N/A 7/13/2022    Procedure: EGD (ESOPHAGOGASTRODUODENOSCOPY);  Surgeon: Kannan Mace MD;  Location: [facility];  Service: Endoscopy;  Laterality: N/A;    EYE SURGERY Bilateral 2008    laser surgery both eyes    INTRALUMINAL GASTROINTESTINAL TRACT IMAGING VIA CAPSULE N/A 7/15/2022    Procedure: IMAGING PROCEDURE, GI TRACT, INTRALUMINAL, VIA CAPSULE;  Surgeon: Kannan Mace MD;  Location: [facility];  Service: Endoscopy;  Laterality: N/A;     NASAL SEPTUM SURGERY      SMALL BOWEL ENTEROSCOPY N/A 2022    Procedure: ENTEROSCOPY Upper SBE;  Surgeon: Salo Frank MD;  Location: [facility];  Service: Endoscopy;  Laterality: N/A;    SUBTOTAL COLECTOMY  2012    transverse colon, for incarcerated umbilical hernia, Dr. Kat Bower     Family History   Problem Relation Age of Onset    Diabetes Sister     Cataracts Sister     Heart disease Brother     Cataracts Brother     Leukemia Mother     Cancer Neg Hx     Amblyopia Neg Hx     Blindness Neg Hx     Glaucoma Neg Hx     Hypertension Neg Hx     Macular degeneration Neg Hx     Retinal detachment Neg Hx     Strabismus Neg Hx     Stroke Neg Hx     Thyroid disease Neg Hx     Kidney disease Neg Hx      Social History     Tobacco Use    Smoking status: Former     Packs/day: 0.50     Years: 15.00     Pack years: 7.50     Types: Cigarettes     Quit date: 1982     Years since quittin.9    Smokeless tobacco: Former    Tobacco comments:     smoked one pack per week   Substance Use Topics    Alcohol use: No    Drug use: No       Review of patient's allergies indicates:   Allergen Reactions    Penicillins Hives     Other reaction(s): Hives  Patient has received cefdinir, ceftriaxone, cefazolin and cefepime in the past with no documented reactions    Sulfa (sulfonamide antibiotics) Other (See Comments)     Shakes, pt states her doctor told her the shakes were possibly caused by an allergy to sulfa            OBJECTIVE:     Vital Signs (Most Recent)  Vitals:    23 2300 23 0510 23 0732 23 1121   BP:  131/61 133/61 (!) 107/54   BP Location:   Right arm Right arm   Patient Position:  Lying Lying Lying   Pulse: (!) 52 (!) 44 (!) 49 (!) 49   Resp:  16 18 18   Temp:  97.5 °F (36.4 °C) 97.3 °F (36.3 °C) 97 °F (36.1 °C)   TempSrc:  Axillary Oral    SpO2:  97% 98% 97%   Weight:       Height:             Date 23 0700 - 23 0659   Shift 3831-7088 5890-9215 9951-4063 24 Hour  Total   INTAKE   Shift Total(mL/kg)       OUTPUT   Urine(mL/kg/hr) 300   300   Shift Total(mL/kg) 300(2.5)   300(2.5)   Weight (kg) 119.1 119.1 119.1 119.1             Medications:   ammonium lactate   Topical (Top) BID    cefTRIAXone (ROCEPHIN) IVPB  2 g Intravenous Q24H    metronidazole  500 mg Intravenous Q8H    mupirocin   Nasal BID    pantoprazole  40 mg Oral Daily           Physical Exam  Vitals and nursing note reviewed.   Constitutional:       General: She is not in acute distress.     Appearance: She is ill-appearing. She is not diaphoretic.   HENT:      Head: Normocephalic and atraumatic.      Mouth/Throat:      Pharynx: No oropharyngeal exudate.   Eyes:      General: No scleral icterus.     Conjunctiva/sclera: Conjunctivae normal.      Pupils: Pupils are equal, round, and reactive to light.   Cardiovascular:      Rate and Rhythm: Normal rate and regular rhythm.      Heart sounds: Normal heart sounds. No murmur heard.  Pulmonary:      Effort: Pulmonary effort is normal. No respiratory distress.      Breath sounds: Normal breath sounds.   Abdominal:      General: Bowel sounds are normal. There is no distension.      Palpations: Abdomen is soft.      Tenderness: There is no abdominal tenderness.   Musculoskeletal:         General: Normal range of motion.      Cervical back: Normal range of motion and neck supple.   Skin:     General: Skin is warm and dry.      Findings: No erythema.   Neurological:      Mental Status: She is alert.      Cranial Nerves: No cranial nerve deficit.       Laboratory:  Recent Labs   Lab 05/28/23  0444 05/29/23  0411 05/30/23  0318   WBC 4.15 2.81* 2.23*   HGB 9.8* 9.2* 9.4*   HCT 33.0* 30.6* 30.4*   * 84* 81*   MONO 9.2  0.4 8.9  0.3 9.9  0.2*       Recent Labs   Lab 05/27/23  0440 05/27/23  0801 05/28/23  0444 05/28/23  0805 05/29/23  0411 05/29/23  0749 05/29/23  1950 05/30/23  0757     144   < > 141  141   < > 140  140 141 140 141   K 5.7*  5.7*   < > 4.8   4.9   < > 3.6  3.5 3.6 3.9 3.6   *  111*   < > 105  105   < > 104  104 105 104 103   CO2 29  29   < > 26  26   < > 26  27 26 25 29   BUN 25*  26*   < > 29*  30*   < > 29*  29* 29* 29* 28*   CREATININE 2.0*  2.0*   < > 2.9*  2.9*   < > 2.7*  2.7* 2.7* 2.6* 2.5*   CALCIUM 9.1  9.1   < > 8.9  8.9   < > 8.7  8.7 8.7 8.5* 8.9   PHOS 4.1  --  4.2  --  4.0  --   --   --     < > = values in this interval not displayed.         Diagnostic Results:  X-Ray: Reviewed  US: Reviewed  Echo: Reviewed  ASSESSMENT/PLAN:     1. CKD4  DM/HTN   -- B/L cr 2.0   -- Now  up also   Hyperkalemia  - per family discussion no agressive interventions   No dialysis. Medical management only.   Cr   2.7 to 2.5 improving  Hyperkalemia improved as well   -- Daily Renal Function Panel  -- Avoid Hypotension.  -- Renally dose all meds  -- Please avoid nephrotoxins, including NSAIDs, aminoglycosides, IV contrast (unless absolutely necessary), gadolinium, fleets and other phosphorous-based laxatives. Caution with antibiotics.    Hyperkalemia    -- US kidney no obstruction   -- bladder no retention   -- Lokelma   no need   -- avoid Acidosis Hyperchloremia    Lactulose to have BM helps with K+     2. HTN (I10) -  controled   3. Anemia of chronic kidney disease       Recent Labs   Lab 05/28/23  0444 05/29/23  0411 05/30/23  0318   HGB 9.8* 9.2* 9.4*   HCT 33.0* 30.6* 30.4*   * 84* 81*         Iron   Lab Results   Component Value Date    IRON 81 05/28/2023    TIBC 284 05/28/2023    FERRITIN 119 05/28/2023       4. MBD (E88.9 M90.80) -  Recent Labs   Lab 05/29/23  0411 05/29/23  0749 05/30/23  0757   CALCIUM 8.7  8.7   < > 8.9   PHOS 4.0  --   --     < > = values in this interval not displayed.       Recent Labs   Lab 05/27/23  0440 05/28/23  0444 05/29/23  0411   MG 1.9 1.8 1.8         Lab Results   Component Value Date    .7 (H) 12/22/2022    CALCIUM 8.9 05/30/2023    CAION 1.22 12/15/2012    PHOS 4.0 05/29/2023     Lab  Results   Component Value Date    HPBEXIXE45QH 18 (L) 07/06/2017       Lab Results   Component Value Date    CO2 29 05/30/2023       5. Nutrition/Hypoalbuminemia (E88.09) -    Recent Labs   Lab 05/28/23  0444 05/29/23  0411   LABPROT 11.7  --    ALBUMIN 2.2* 2.1*       Nepro with meals TID.        Thank you for allowing me to participate in care of your patient  With any question please call   Cesar Timmons MD     [facility]  RAISA Oliver MD,   MD JAYME Car MD E. V. Harmon, NP  [address]   [place], [address] [phone]  After hours answering service: [phone]

## 2023-05-30 NOTE — PLAN OF CARE
Problem: Physical Therapy  Goal: Physical Therapy Goal  Description: Goals to be met by: 23     Patient will increase functional independence with mobility by performin. Supine to sit with Moderate Assistance  2. Sit to supine with Moderate Assistance  3. Rolling to Left and Right with Moderate Assistance.  4. Sit to stand transfer with Moderate Assistance  5. Bed to wheelchair transfer with Moderate Assistance via stand or scoot pivot with use of appropriate AD.     Outcome: Ongoing, Progressing     PT Eval completed, note to follow. Pt requires TotalA x 2 for bed mobility. Pt sat EOB ~25 mins with ModA progressed to CGA with assist and cueing due to posterior lean. Recommending return to SNF (moderate intensity therapy).

## 2023-05-31 VITALS
RESPIRATION RATE: 20 BRPM | WEIGHT: 262.56 LBS | OXYGEN SATURATION: 98 % | TEMPERATURE: 96 F | HEART RATE: 53 BPM | DIASTOLIC BLOOD PRESSURE: 65 MMHG | BODY MASS INDEX: 41.21 KG/M2 | SYSTOLIC BLOOD PRESSURE: 141 MMHG | HEIGHT: 67 IN

## 2023-05-31 LAB
ANION GAP SERPL CALC-SCNC: 10 MMOL/L (ref 8–16)
BASOPHILS # BLD AUTO: 0.01 K/UL (ref 0–0.2)
BASOPHILS NFR BLD: 0.5 % (ref 0–1.9)
BUN SERPL-MCNC: 28 MG/DL (ref 8–23)
CALCIUM SERPL-MCNC: 8.7 MG/DL (ref 8.7–10.5)
CHLORIDE SERPL-SCNC: 105 MMOL/L (ref 95–110)
CO2 SERPL-SCNC: 26 MMOL/L (ref 23–29)
CREAT SERPL-MCNC: 2.4 MG/DL (ref 0.5–1.4)
DIFFERENTIAL METHOD: ABNORMAL
EOSINOPHIL # BLD AUTO: 0 K/UL (ref 0–0.5)
EOSINOPHIL NFR BLD: 0.5 % (ref 0–8)
ERYTHROCYTE [DISTWIDTH] IN BLOOD BY AUTOMATED COUNT: 18.2 % (ref 11.5–14.5)
EST. GFR  (NO RACE VARIABLE): 20 ML/MIN/1.73 M^2
GLUCOSE SERPL-MCNC: 169 MG/DL (ref 70–110)
HCT VFR BLD AUTO: 30.6 % (ref 37–48.5)
HGB BLD-MCNC: 9.3 G/DL (ref 12–16)
IMM GRANULOCYTES # BLD AUTO: 0.01 K/UL (ref 0–0.04)
IMM GRANULOCYTES NFR BLD AUTO: 0.5 % (ref 0–0.5)
LYMPHOCYTES # BLD AUTO: 0.4 K/UL (ref 1–4.8)
LYMPHOCYTES NFR BLD: 17.6 % (ref 18–48)
MCH RBC QN AUTO: 24.7 PG (ref 27–31)
MCHC RBC AUTO-ENTMCNC: 30.4 G/DL (ref 32–36)
MCV RBC AUTO: 81 FL (ref 82–98)
MONOCYTES # BLD AUTO: 0.2 K/UL (ref 0.3–1)
MONOCYTES NFR BLD: 8.6 % (ref 4–15)
NEUTROPHILS # BLD AUTO: 1.6 K/UL (ref 1.8–7.7)
NEUTROPHILS NFR BLD: 72.3 % (ref 38–73)
NRBC BLD-RTO: 1 /100 WBC
PATH REV BLD -IMP: NORMAL
PLATELET # BLD AUTO: 89 K/UL (ref 150–450)
PMV BLD AUTO: 11.3 FL (ref 9.2–12.9)
POCT GLUCOSE: 163 MG/DL (ref 70–110)
POCT GLUCOSE: 205 MG/DL (ref 70–110)
POTASSIUM SERPL-SCNC: 3.7 MMOL/L (ref 3.5–5.1)
RBC # BLD AUTO: 3.77 M/UL (ref 4–5.4)
SODIUM SERPL-SCNC: 141 MMOL/L (ref 136–145)
WBC # BLD AUTO: 2.21 K/UL (ref 3.9–12.7)

## 2023-05-31 PROCEDURE — 25000003 PHARM REV CODE 250: Performed by: FAMILY MEDICINE

## 2023-05-31 PROCEDURE — 25000003 PHARM REV CODE 250: Performed by: STUDENT IN AN ORGANIZED HEALTH CARE EDUCATION/TRAINING PROGRAM

## 2023-05-31 PROCEDURE — 36415 COLL VENOUS BLD VENIPUNCTURE: CPT | Performed by: FAMILY MEDICINE

## 2023-05-31 PROCEDURE — 36415 COLL VENOUS BLD VENIPUNCTURE: CPT | Performed by: STUDENT IN AN ORGANIZED HEALTH CARE EDUCATION/TRAINING PROGRAM

## 2023-05-31 PROCEDURE — 80048 BASIC METABOLIC PNL TOTAL CA: CPT | Performed by: FAMILY MEDICINE

## 2023-05-31 PROCEDURE — S0030 INJECTION, METRONIDAZOLE: HCPCS | Performed by: STUDENT IN AN ORGANIZED HEALTH CARE EDUCATION/TRAINING PROGRAM

## 2023-05-31 PROCEDURE — 85025 COMPLETE CBC W/AUTO DIFF WBC: CPT | Performed by: STUDENT IN AN ORGANIZED HEALTH CARE EDUCATION/TRAINING PROGRAM

## 2023-05-31 RX ORDER — AMMONIUM LACTATE 12 G/100G
LOTION TOPICAL 2 TIMES DAILY
Qty: 225 G | Refills: 0 | Status: ON HOLD
Start: 2023-05-31 | End: 2023-06-06 | Stop reason: HOSPADM

## 2023-05-31 RX ORDER — METRONIDAZOLE 500 MG/1
500 TABLET ORAL 3 TIMES DAILY
Qty: 15 TABLET | Refills: 0 | Status: ON HOLD
Start: 2023-05-31 | End: 2023-06-06 | Stop reason: HOSPADM

## 2023-05-31 RX ADMIN — METRONIDAZOLE 500 MG: 500 INJECTION, SOLUTION INTRAVENOUS at 08:05

## 2023-05-31 RX ADMIN — MUPIROCIN: 20 OINTMENT TOPICAL at 08:05

## 2023-05-31 RX ADMIN — PANTOPRAZOLE SODIUM 40 MG: 40 GRANULE, DELAYED RELEASE ORAL at 08:05

## 2023-05-31 RX ADMIN — METRONIDAZOLE 500 MG: 500 INJECTION, SOLUTION INTRAVENOUS at 01:05

## 2023-05-31 RX ADMIN — Medication: at 01:05

## 2023-05-31 RX ADMIN — FOLIC ACID 1 MG: 1 TABLET ORAL at 08:05

## 2023-05-31 RX ADMIN — INSULIN ASPART 2 UNITS: 100 INJECTION, SOLUTION INTRAVENOUS; SUBCUTANEOUS at 01:05

## 2023-05-31 NOTE — PLAN OF CARE
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]  Yes    Pancytopenia [D61.818]  Yes    ACP (advance care planning) [Z71.89]  Not Applicable    Venous insufficiency of both lower extremities [I87.2]  Yes    (HFpEF) heart failure with preserved ejection fraction [I50.30]  Yes    Hyperkalemia [E87.5]  Yes    Type 2 diabetes mellitus with diabetic polyneuropathy, with long-term current use of insulin [E11.42, Z79.4]  Not Applicable     Chronic    LOREN (acute kidney injury) [N17.9]  Yes    Dysphagia [R13.10]  Yes    Debility [R53.81]  Yes     Chronic    Essential hypertension [I10]  Yes     Chronic    Hyperlipidemia LDL goal <100 [E78.5]  Yes     Chronic      Resolved Hospital Problems    Diagnosis Date Resolved POA    Bradycardia [R00.1] 05/27/2023 Yes    Acute renal failure superimposed on stage 3 chronic kidney disease [N17.9, N18.30] 05/27/2023 Yes    Wheelchair dependent [Z99.3] 05/27/2023 Not Applicable     Chronic    Anemia of chronic disease [D63.8] 05/27/2023 Yes     Chronic       Allergies:  Review of patient's allergies indicates:   Allergen Reactions    Penicillins Hives     Other reaction(s): Hives  Patient has received cefdinir, ceftriaxone, cefazolin and cefepime in the past with no documented reactions    Sulfa (sulfonamide antibiotics) Other (See Comments)     Eyad, pt states her doctor told her the shakes were possibly caused by an allergy to sulfa       Diet: cardiac diet and diabetic diet: 2000 calorie    Activities: Activity as tolerated    Goals of Care Treatment Preferences:  Code Status: DNR           What is most important right now is to focus on remaining as independent as possible, extending life as long as possible, even it it means sacrificing quality, improvement in condition but with limits to invasive therapies.  Accordingly, we have decided that the best plan to meet the patient's goals includes continuing with treatment.      Nursing:  Per facility protocol         CONSULTS:    Physical Therapy to evaluate and treat. , Occupational Therapy to evaluate and treat., and Speech Therapy to evaluate and treat for Cognition.        WOUND CARE ORDERS  Nursing to cleanse perineum and abdomen skin folds with cleansing cloths and apply thin layer of Triad ointment BID and prn cleansing of incontinent episode. Do not apply cover dressing.    Apply  LacHydrin lotion  to BLE BID and leave open to air.    Medications: Review discharge medications with patient and family and provide education.      Current Discharge Medication List        START taking these medications    Details   ammonium lactate (LAC-HYDRIN) 12 % lotion Apply topically 2 (two) times daily.  Qty: 225 g, Refills: 0      metroNIDAZOLE (FLAGYL) 500 MG tablet Take 1 tablet (500 mg total) by mouth 3 (three) times daily. for 5 days  Qty: 15 tablet, Refills: 0           CONTINUE these medications which have NOT CHANGED    Details   amLODIPine (NORVASC) 10 MG tablet Take 1 tablet (10 mg total) by mouth once daily.  Qty: 30 tablet, Refills: 11    Comments: .      aspirin 81 MG Chew Chew 1 tablet (81 mg total) by mouth once daily.  Qty: 30 tablet, Refills: 5      atorvastatin (LIPITOR) 40 MG tablet Take 1 tablet (40 mg total) by mouth every evening.  Qty: 90 tablet, Refills: 3    Associated Diagnoses: Hyperlipidemia LDL goal <100      diclofenac sodium (VOLTAREN) 1 % Gel Apply 2 g topically once daily. Apply to L Thumb as needed      furosemide (LASIX) 40 MG tablet Take 1 tablet (40 mg total) by mouth daily as needed (For worsening shortness of breath,  swelling, or 3lb weight gain on scale).  Qty: 30 tablet, Refills: 11      insulin glargine (LANTUS U-100 INSULIN) 100 unit/mL injection Inject 15 Units into the skin every evening.  Qty: 13.5 mL, Refills: 3    Associated Diagnoses: Type 2 diabetes mellitus with diabetic polyneuropathy, with long-term current use of insulin      lancets (TRUEPLUS LANCETS) 33 gauge Misc TEST TWO TIMES DAILY  Qty: 200 each, Refills: 3    Associated Diagnoses: Type 2 diabetes mellitus with diabetic polyneuropathy, with long-term current use of insulin      lisinopriL (PRINIVIL,ZESTRIL) 5 MG tablet Take 1 tablet (5 mg total) by mouth once daily.  Qty: 90 tablet, Refills: 3    Comments: .  Associated Diagnoses: Essential hypertension      pantoprazole (PROTONIX) 40 MG tablet Take 1 tablet (40 mg total) by mouth once daily.  Qty: 90 tablet, Refills: 3    Comments: NEW RX REQUEST FOR PATIENT DUE TO USING NEW MAIL ORDER PHARMACY -[facility] ([facility])  Associated Diagnoses: History of gastrointestinal bleeding; Dieulafoy lesion of intestine      sodium bicarbonate 650 MG tablet Take 1 tablet (650 mg total) by mouth 2 (two) times daily.  Qty: 180 tablet, Refills: 3      blood sugar diagnostic (TRUE METRIX GLUCOSE TEST STRIP) Strp TEST BLOOD SUGAR TWICE DAILY  Qty: 200 strip, Refills: 3    Associated Diagnoses: Type 2 diabetes mellitus with diabetic polyneuropathy, with long-term current use of insulin                Immunizations Administered as of 5/31/2023       No immunizations on file.            This patient has had both covid vaccinations    Some patients may experience side effects after vaccination.  These may include fever, headache, muscle or joint aches.  Most symptoms resolve with 24-48 hours and do not require urgent medical evaluation unless they persist for more than 72 hours or symptoms are concerning for an unrelated medical condition.          _________________________________  Su N  Innocent-MD Nilesh  05/31/2023

## 2023-05-31 NOTE — ASSESSMENT & PLAN NOTE
Improving after initiating empiric antibiotics    Discontinue neutropenic precautions   Hematology is following   Follow CBC and smear sent for pathology review

## 2023-05-31 NOTE — PT/OT/SLP PROGRESS
Physical Therapy      Patient Name:  Sherin Ortiz   MRN:  [id number]    Patient not seen today secondary to  (pt discharging soon([id number])). Will follow-up n/a.

## 2023-05-31 NOTE — DISCHARGE SUMMARY
[facility] Medicine  Discharge Summary      Patient Name: Sherin Ortiz  MRN: [id number]  SHAHNAZ: [id number]  Patient Class: IP- Inpatient  Admission Date: 5/26/2023  Hospital Length of Stay: 3 days  Discharge Date and Time: 5/31/2023  6:28 PM  Attending Physician: Su Maddox*   Discharging Provider: Su Maddox MD  Primary Care Provider: Darby Baum MD    Primary Care Team: Networked reference to record PCT     HPI:   Sherin Ortiz is an 78-year-old female with PMH HTN, DMT2, HLD, debility, HFpEF, obesity, TIA, CKD stage 4, and PVD.  She was brought in from her nursing home for evaluation of difficulty swallowing for the past month.  She states that she can swallow pills and food but it feels like it is stuck in her throat.  She denies coughing or choking with swallowing.  However, she does report an occasional productive cough with thick white sputum and SOB.  She denies CP, N/V/D, abdominal pain, fever, chills, or trouble speaking.  Patient is a poor historian, spoke with her daughter via telephone who confirms the above history.          * No surgery found *      Hospital Course:   78-year-old woman with complaints of neck pain, difficulty swallowing, and progressive decline over the last 4-6 weeks.  She initially presented to the hospital for dysphagia and was found to have large left loculated pleural effusion, pancytopenia, hyperglycemia and hypothermia concerning for underlying infective process. She was started on empiric antibiotics with improvement in her pancytopenia and mental status.  Her pleural effusion was evaluated by pulmonology, but appears to be simple effusion on ultrasound imaging at bedside.  Patient's hyperkalemia peaked and started to improve.  Her mental status started to improve after empiric antibiotics.  Patient still complains of globus sensation and dysphagia.  Currently planning to undergo barium swallow, esophagram, and if  necessary will consult GI for EGD.       Goals of Care Treatment Preferences:  Code Status: DNR          What is most important right now is to focus on remaining as independent as possible, extending life as long as possible, even it it means sacrificing quality, improvement in condition but with limits to invasive therapies.  Accordingly, we have decided that the best plan to meet the patient's goals includes continuing with treatment.      Consults:   Consults (From admission, onward)          Status Ordering Provider     Inpatient consult to Palliative Care  Once        Provider:  (Not yet assigned)    Acknowledged INNOCENT-LE VASQUEZ     Inpatient consult to Social Work/Case Management  Once        Provider:  (Not yet assigned)    Completed MUKUL MENDIOLA     Inpatient consult to Pathology  Once        Provider:  (Not yet assigned)    Acknowledged MUKUL MENDIOLA     IP consult to case management  Once        Provider:  (Not yet assigned)    Completed HENOK JADE     Inpatient consult to [facility]  Once        Provider:  Cesar Timmons MD    Completed ELLEN LUCAS     ENT  Once        Provider:  Wendie Jacobs MD    Completed ELLEN LUCAS     Inpatient consult to Hematology/Oncology  Once        Provider:  Josue Whitaker MD    Completed ELLEN LUCAS     Inpatient consult to Pulmonology  Once        Provider:  Dmaien Parr MD    Completed ELLEN LUCAS            Pulmonary  * Pleural effusion, left  Large left pleural effusion, initially thought to be infective focus or secondary to chronic aspiration.  On bedside ultrasound, appears to be simple left pleural effusion.  Pulmonology is following   No indication for thoracentesis at this time   Continuing ceftriaxone and metronidazole-deescalate to p.o. at discharge      Cardiac/Vascular  Venous insufficiency of both lower extremities  Stable.  Wound Care consulted      (HFpEF)  heart failure with preserved ejection fraction  Does not appear to be in exacerbation   Continue to monitor  Hold oral diuretics      Essential hypertension  Hold lisinopril   Hold amlodipine-resume at discharge      Hyperlipidemia LDL goal <100  Hold statin for now-resume at discharge    Renal/  Hyperkalemia  Improving   Hyperkalemia in the setting of LOREN.  Patient had peak potassium was 6.4 requiring aggressive medical management in the inpatient setting.   She is no longer requiring bicarbonate infusion, Lokelma or Lasix  Decrease renal function to q.12 hours-then daily      LOREN (acute kidney injury)  Improving.    Patient with LOREN likely due to ATN.  Previously with complication of hyperkalemia, but this is improving.  Nephrology is following; appreciate    Oncology  Pancytopenia  Improving after initiating empiric antibiotics    Discontinue neutropenic precautions   Hematology is following   Follow CBC and smear sent for pathology review      Endocrine  Type 2 diabetes mellitus with diabetic polyneuropathy, with long-term current use of insulin  Patient's FSGs are controlled on current medication regimen.  Last A1c reviewed-   Lab Results   Component Value Date    HGBA1C 8.1 (H) 03/23/2023     Most recent fingerstick glucose reviewed-   Recent Labs   Lab 05/30/23  1806 05/30/23  1939 05/31/23  0330   POCTGLUCOSE 248* 207* 163*     Current correctional scale  Low  Maintain anti-hyperglycemic dose as follows-   Antihyperglycemics (From admission, onward)      Start     Stop Route Frequency Ordered    05/26/23 2055  insulin aspart U-100 pen 0-5 Units         -- SubQ Before meals & nightly PRN 05/26/23 2008          Hold Oral hypoglycemics while patient is in the hospital.    GI  Dysphagia  Reports of dysphagia for several weeks prior to presentation.  ENT evaluation including flexible laryngoscopy was relatively unrevealing.    Remove NG    modified barium swallow-reviewed appreciate SLP recs   upper GI  fluoroscopy and EGD afterwards if warranted.      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 The patient wishes to have a natural, peaceful death.  Along those lines, the patient does not wish to have CPR or other invasive treatments performed when her heart and/or breathing stops. I communicated to the patient that a DNR order would be placed in her medical record to reflect this preference.  I spent a total of 6 minutes engaging the patient in this advance care planning discussion.    LaPOST in paper chart.  Picture uploaded into media     Patient's family will continue to address goals of care with the patient.  We will continue an open discussion regarding patient's desires for further care and tentative plans for home hospice upon discharge.    Hospice consult placed for information session with the patient and daughter.      Advance Care Planning     Date: 05/30/2023    [facility]  I engaged the family in a conversation about advance care planning and we specifically addressed what the goals of care would be moving forward, in light of the patient's change in clinical status, specifically progressive physical and cognitive decline.  We did specifically address the patient's likely prognosis, which is fair .  We explored the patient's values and preferences for future care.  The family endorses that what is most important right now is to focus on remaining as independent as possible, extending life as long as possible, even it it means sacrificing quality and improvement in condition but with limits to  invasive therapies    Accordingly, we have decided that the best plan to meet the patient's goals includes continuing with treatment    I did explain the role for hospice care at this stage of the patient's illness, including its ability to help the patient live with the best quality of life possible.  We will not be making a hospice referral.    I spent a total of 20 minutes engaging the patient in this advance care planning discussion.               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] 05/29/2023 Yes    Pancytopenia [D61.818] 05/26/2023 Yes    ACP (advance care planning) [Z71.89] 07/20/2022 Not Applicable    Venous insufficiency of both lower extremities [I87.2]  Yes    (HFpEF) heart failure with preserved ejection fraction [I50.30] 01/22/2018 Yes    Hyperkalemia [E87.5] 01/27/2016 Yes    Type 2 diabetes mellitus with diabetic polyneuropathy, with long-term current use of insulin [E11.42, Z79.4] 09/18/2015 Not Applicable     Chronic    LOREN (acute kidney injury) [N17.9] 09/18/2015 Yes    Dysphagia [R13.10] 07/01/2014 Yes    Debility [R53.81] 12/19/2012 Yes     Chronic    Essential hypertension [I10] 11/14/2012 Yes     Chronic    Hyperlipidemia LDL goal <100 [E78.5] 08/14/2012 Yes     Chronic      Problems Resolved During this Admission:    Diagnosis Date Noted Date Resolved POA    Bradycardia [R00.1] 05/26/2023 05/27/2023 Yes    Acute renal failure superimposed on stage 3 chronic kidney disease [N17.9, N18.30] 03/22/2019 05/27/2023 Yes    Wheelchair dependent [Z99.3] 11/29/2015 05/27/2023 Not Applicable     Chronic    Anemia of chronic disease [D63.8] 09/18/2015 05/27/2023 Yes     Chronic        Discharged Condition: stable    Disposition: Home-Health Care [facility] [place]    Follow Up:   Follow-up Information       [facility] - Nephrology 5th Fl Follow up on 6/21/2023.    Specialty: Nephrology  Why: at 4:00pm; previously scheduled nephrology appointment with NP Kaity Huizar  Contact information:  [address]  [facility] [address] [phone]  Additional information:  Nephrology - Main Building, 5th Floor   Please park in [facility] and take Clinic elevator             Darby Baum MD Follow up on 6/29/2023.    Specialty: Internal Medicine  Why: at 8:40 AM; previously scheduled PCP appointment  Contact information:  [address]  [facility] [address]  [phone]                           Patient Instructions:      Diet diabetic     Diet Cardiac     Activity as tolerated       Significant Diagnostic Studies:     Pending Diagnostic Studies:       Procedure Component Value Units Date/Time    Copper, serum [[id number]] Collected: 05/28/23 0444    Order Status: Sent Lab Status: In process Updated: 05/28/23 0952    Specimen: Blood     FL Esophagram Complete [[id number]]     Order Status: Sent Lab Status: No result     Methylmalonic acid, serum [[id number]] Collected: 05/28/23 0444    Order Status: Sent Lab Status: In process Updated: 05/28/23 1407    Specimen: Blood            Medications:  Reconciled Home Medications:      Medication List        START taking these medications      ammonium lactate 12 % lotion  Commonly known as: LAC-HYDRIN  Apply topically 2 (two) times daily.     metroNIDAZOLE 500 MG tablet  Commonly known as: FLAGYL  Take 1 tablet (500 mg total) by mouth 3 (three) times daily. for 5 days            CONTINUE taking these medications      amLODIPine 10 MG tablet  Commonly known as: NORVASC  Take 1 tablet (10 mg total) by mouth once daily.     atorvastatin 40 MG tablet  Commonly known as: LIPITOR  Take 1 tablet (40 mg total) by mouth every evening.     SHANNAN CHEWABLE  ASPIRIN 81 MG Chew  Generic drug: aspirin  Chew 1 tablet (81 mg total) by mouth once daily.     blood sugar diagnostic Strp  Commonly known as: TRUE METRIX GLUCOSE TEST STRIP  TEST BLOOD SUGAR TWICE DAILY     diclofenac sodium 1 % Gel  Commonly known as: VOLTAREN  Apply 2 g topically once daily. Apply to L Thumb as needed     furosemide 40 MG tablet  Commonly known as: LASIX  Take 1 tablet (40 mg total) by mouth daily as needed (For worsening shortness of breath, swelling, or 3lb weight gain on scale).     insulin glargine 100 unit/mL injection  Commonly known as: LANTUS U-100 INSULIN  Inject 15 Units into the skin every evening.     lancets 33 gauge Misc  Commonly known as: TRUEPLUS LANCETS  TEST TWO TIMES DAILY     lisinopriL 5 MG tablet  Commonly known as: PRINIVIL,ZESTRIL  Take 1 tablet (5 mg total) by mouth once daily.     pantoprazole 40 MG tablet  Commonly known as: PROTONIX  Take 1 tablet (40 mg total) by mouth once daily.     sodium bicarbonate 650 MG tablet  Take 1 tablet (650 mg total) by mouth 2 (two) times daily.              Indwelling Lines/Drains at time of discharge:   Lines/Drains/Airways       None                   Time spent on the discharge of patient: 35 minutes         Su Maddox MD  Department of Hospital Medicine  [place] - Telemetry

## 2023-05-31 NOTE — PROGRESS NOTES
[facility]  Progress Note    Patient Name: Sherin Ortiz  MRN: [id number]  Patient Class: IP- Inpatient   Admission Date: 5/26/2023  Length of Stay: 3 days  Attending Physician: Su Maddox*  Primary Care Provider: Darby Baum MD        Subjective:     Principal Problem:Pleural effusion, left        HPI:  Sherin Ortiz is an 78-year-old female with PMH HTN, DMT2, HLD, debility, HFpEF, obesity, TIA, CKD stage 4, and PVD.  She was brought in from her nursing home for evaluation of difficulty swallowing for the past month.  She states that she can swallow pills and food but it feels like it is stuck in her throat.  She denies coughing or choking with swallowing.  However, she does report an occasional productive cough with thick white sputum and SOB.  She denies CP, N/V/D, abdominal pain, fever, chills, or trouble speaking.  Patient is a poor historian, spoke with her daughter via telephone who confirms the above history.          Overview/Hospital Course:  78-year-old woman with complaints of neck pain, difficulty swallowing, and progressive decline over the last 4-6 weeks.  She initially presented to the hospital for dysphagia and was found to have large left loculated pleural effusion, pancytopenia, hyperglycemia and hypothermia concerning for underlying infective process. She was started on empiric antibiotics with improvement in her pancytopenia and mental status.  Her pleural effusion was evaluated by pulmonology, but appears to be simple effusion on ultrasound imaging at bedside.  Patient's hyperkalemia peaked and started to improve.  Her mental status started to improve after empiric antibiotics.  Patient still complains of globus sensation and dysphagia.  Currently planning to undergo barium swallow, esophagram, and if necessary will consult GI for EGD.      Interval History:   Awake and alert, no new complaint  Creatinine slowly improving  Continue folate  supplement.  Plan to discharge back to SNF per family request      Review of Systems   Constitutional:  Negative for chills and fever.   HENT:  Negative for dental problem and drooling.    Respiratory:  Negative for cough, choking, shortness of breath, wheezing and stridor.    Cardiovascular:  Negative for chest pain.   Gastrointestinal:  Negative for abdominal pain, diarrhea, nausea and vomiting.   Genitourinary:  Negative for decreased urine volume.   Musculoskeletal:  Positive for gait problem (Wheelchair-bound). Negative for neck pain and neck stiffness.   Skin:  Positive for wound (Venous stasis discoloration/wound).   Neurological:  Negative for dizziness, speech difficulty, light-headedness and headaches.   Psychiatric/Behavioral:  Negative for agitation and confusion. The patient is not nervous/anxious.    Objective:     Vital Signs (Most Recent):  Temp: (!) 95.4 °F (35.2 °C) (05/31/23 0743)  Pulse: (!) 56 (05/31/23 0743)  Resp: 20 (05/31/23 0743)  BP: (!) 142/67 (05/31/23 0743)  SpO2: (!) 94 % (05/31/23 0743) Vital Signs (24h Range):  Temp:  [95.4 °F (35.2 °C)-97.6 °F (36.4 °C)] 95.4 °F (35.2 °C)  Pulse:  [49-56] 56  Resp:  [18-20] 20  SpO2:  [94 %-97 %] 94 %  BP: (107-142)/(54-67) 142/67     Weight: 119.1 kg (262 lb 9.1 oz)  Body mass index is 41.12 kg/m².    Intake/Output Summary (Last 24 hours) at 5/31/2023 0958  Last data filed at 5/31/2023 0218  Gross per 24 hour   Intake --   Output 250 ml   Net -250 ml           Physical Exam  Vitals and nursing note reviewed.   Constitutional:       General: She is not in acute distress.     Appearance: She is obese. She is not ill-appearing or toxic-appearing.   HENT:      Head: Normocephalic and atraumatic.   Neck:      Thyroid: Thyromegaly present.      Trachea: No tracheal deviation.   Cardiovascular:      Rate and Rhythm: Regular rhythm. Bradycardia present.      Pulses:           Dorsalis pedis pulses are 1+ on the right side and 1+ on the left side.       Heart sounds: Normal heart sounds.   Pulmonary:      Effort: Pulmonary effort is normal. No respiratory distress.      Breath sounds: No wheezing.   Abdominal:      General: Bowel sounds are normal. There is no distension.      Palpations: Abdomen is soft.      Tenderness: There is no abdominal tenderness. There is no rebound.   Musculoskeletal:      Cervical back: Normal range of motion.      Right lower leg: Tenderness present. 1+ Edema present.      Left lower leg: Tenderness present. 1+ Edema present.   Lymphadenopathy:      Cervical: No cervical adenopathy.   Skin:     General: Skin is warm.      Findings: Wound present.      Comments: Discoloration BLE; small abrasion/wound w/ pink base RLE - no drainage or warmth noted   Neurological:      Mental Status: She is alert and oriented to person, place, and time.   Psychiatric:         Mood and Affect: Mood normal.         Behavior: Behavior normal.         Thought Content: Thought content normal.           Significant Labs: A1C:   Recent Labs   Lab 03/23/23  1418   HGBA1C 8.1*       ABGs: No results for input(s): PH, PCO2, HCO3, POCSATURATED, BE, TOTALHB, COHB, METHB, O2HB, POCFIO2, PO2 in the last 48 hours.  Blood Culture: No results for input(s): LABBLOO in the last 48 hours.  CBC:   Recent Labs   Lab 05/30/23  0318 05/31/23  0331   WBC 2.23* 2.21*   HGB 9.4* 9.3*   HCT 30.4* 30.6*   PLT 81* 89*       CMP:   Recent Labs   Lab 05/30/23  0757 05/30/23  2000 05/31/23  0741    140 141   K 3.6 3.5 3.7    104 105   CO2 29 26 26   * 207* 169*   BUN 28* 29* 28*   CREATININE 2.5* 2.4* 2.4*   CALCIUM 8.9 8.9 8.7   ANIONGAP 9 10 10       Coagulation: No results for input(s): PT, INR, APTT in the last 48 hours.  Lipase: No results for input(s): LIPASE in the last 48 hours.  Lipid Panel: No results for input(s): CHOL, HDL, LDLCALC, TRIG, CHOLHDL in the last 48 hours.  Troponin: No results for input(s): TROPONINI, TROPONINIHS in the last 48 hours.  TSH:    Recent Labs   Lab 05/26/23 2046   TSH 3.930       Urine Culture: No results for input(s): LABURIN in the last 48 hours.  Urine Studies: No results for input(s): COLORU, APPEARANCEUA, PHUR, SPECGRAV, PROTEINUA, GLUCUA, KETONESU, BILIRUBINUA, OCCULTUA, NITRITE, UROBILINOGEN, LEUKOCYTESUR, RBCUA, WBCUA, BACTERIA, SQUAMEPITHEL, HYALINECASTS in the last 48 hours.    Invalid input(s): WRIGHTSUR    Significant Imaging: I have reviewed all pertinent imaging results/findings within the past 24 hours.      Assessment/Plan:      * Pleural effusion, left  Large left pleural effusion, initially thought to be infective focus or secondary to chronic aspiration.  On bedside ultrasound, appears to be simple left pleural effusion.  Pulmonology is following   No indication for thoracentesis at this time   Continuing ceftriaxone and metronidazole-deescalate to p.o. 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 The patient wishes to have a natural, peaceful death.  Along those lines, the patient does not wish to have CPR or other invasive treatments performed when her heart and/or breathing stops. I communicated to the patient that a DNR order would be placed in her medical record to reflect this preference.  I spent a total of 6 minutes engaging the patient in this advance care planning discussion.    LaPOST in paper chart.  Picture  uploaded into media     Patient's family will continue to address goals of care with the patient.  We will continue an open discussion regarding patient's desires for further care and tentative plans for home hospice upon discharge.    Hospice consult placed for information session with the patient and daughter.      Advance Care Planning     Date: 05/30/2023    [facility]  I engaged the family in a conversation about advance care planning and we specifically addressed what the goals of care would be moving forward, in light of the patient's change in clinical status, specifically progressive physical and cognitive decline.  We did specifically address the patient's likely prognosis, which is fair .  We explored the patient's values and preferences for future care.  The family endorses that what is most important right now is to focus on remaining as independent as possible, extending life as long as possible, even it it means sacrificing quality and improvement in condition but with limits to invasive therapies    Accordingly, we have decided that the best plan to meet the patient's goals includes continuing with treatment    I did explain the role for hospice care at this stage of the patient's illness, including its ability to help the patient live with the best quality of life possible.  We will not be making a hospice referral.    I spent a total of 20 minutes engaging the patient in this advance care planning discussion.               Venous insufficiency of both lower extremities  Stable.  Wound Care consulted      (HFpEF) heart failure with preserved ejection fraction  Does not appear to be in exacerbation   Continue to monitor  Hold oral diuretics      Dermatitis, stasis        Hyperkalemia  Improving   Hyperkalemia in the setting of LOREN.  Patient had peak potassium was 6.4 requiring aggressive medical management in the inpatient setting.   She is no longer requiring bicarbonate infusion, Lokelma or  Lasix  Decrease renal function to q.12 hours-then daily      Type 2 diabetes mellitus with diabetic polyneuropathy, with long-term current use of insulin  Patient's FSGs are controlled on current medication regimen.  Last A1c reviewed-   Lab Results   Component Value Date    HGBA1C 8.1 (H) 03/23/2023     Most recent fingerstick glucose reviewed-   Recent Labs   Lab 05/30/23  1806 05/30/23  1939 05/31/23  0330   POCTGLUCOSE 248* 207* 163*     Current correctional scale  Low  Maintain anti-hyperglycemic dose as follows-   Antihyperglycemics (From admission, onward)    Start     Stop Route Frequency Ordered    05/26/23 2055  insulin aspart U-100 pen 0-5 Units         -- SubQ Before meals & nightly PRN 05/26/23 2008        Hold Oral hypoglycemics while patient is in the hospital.    LOREN (acute kidney injury)  Improving.    Patient with LOREN likely due to ATN.  Previously with complication of hyperkalemia, but this is improving.  Nephrology is following; appreciate    Dysphagia  Reports of dysphagia for several weeks prior to presentation.  ENT evaluation including flexible laryngoscopy was relatively unrevealing.    Remove NG    modified barium swallow-reviewed appreciate SLP recs   upper GI fluoroscopy and EGD afterwards if warranted.      Debility  Wheelchair-bound at baseline, but transfers to chair  Subacute decline over the last 4-6 weeks per discussion with family at bedside  Unable to sit upright for full esophagram  PT and OT ordered         Essential hypertension  Hold lisinopril   Hold amlodipine-resume at discharge      Hyperlipidemia LDL goal <100  Hold statin for now-resume at discharge      VTE Risk Mitigation (From admission, onward)         Ordered     Reason for No Pharmacological VTE Prophylaxis  Once        Question:  Reasons:  Answer:  Thrombocytopenia    05/26/23 2008     IP VTE HIGH RISK PATIENT  Once         05/26/23 2008     Place sequential compression device  Until discontinued         05/26/23  2008                Discharge Planning   GABE: 5/31/2023     Code Status: DNR   Is the patient medically ready for discharge?:     Reason for patient still in hospital (select all that apply): Pending disposition  Discharge Plan A: Skilled Nursing Facility                  Su Maddox MD  Department of [facility]   [facility]

## 2023-05-31 NOTE — ASSESSMENT & PLAN NOTE
Large left pleural effusion, initially thought to be infective focus or secondary to chronic aspiration.  On bedside ultrasound, appears to be simple left pleural effusion.  Pulmonology is following   No indication for thoracentesis at this time   Continuing ceftriaxone and metronidazole-deescalate to p.o. at discharge

## 2023-05-31 NOTE — ASSESSMENT & PLAN NOTE
Patient's FSGs are controlled on current medication regimen.  Last A1c reviewed-   Lab Results   Component Value Date    HGBA1C 8.1 (H) 03/23/2023     Most recent fingerstick glucose reviewed-   Recent Labs   Lab 05/30/23  1806 05/30/23  1939 05/31/23  0330   POCTGLUCOSE 248* 207* 163*     Current correctional scale  Low  Maintain anti-hyperglycemic dose as follows-   Antihyperglycemics (From admission, onward)    Start     Stop Route Frequency Ordered    05/26/23 2055  insulin aspart U-100 pen 0-5 Units         -- SubQ Before meals & nightly PRN 05/26/23 2008        Hold Oral hypoglycemics while patient is in the hospital.

## 2023-05-31 NOTE — SUBJECTIVE & OBJECTIVE
Interval History:   Awake and alert, no new complaint  Creatinine slowly improving  Continue folate supplement.  Plan to discharge back to SNF per family request      Review of Systems   Constitutional:  Negative for chills and fever.   HENT:  Negative for dental problem and drooling.    Respiratory:  Negative for cough, choking, shortness of breath, wheezing and stridor.    Cardiovascular:  Negative for chest pain.   Gastrointestinal:  Negative for abdominal pain, diarrhea, nausea and vomiting.   Genitourinary:  Negative for decreased urine volume.   Musculoskeletal:  Positive for gait problem (Wheelchair-bound). Negative for neck pain and neck stiffness.   Skin:  Positive for wound (Venous stasis discoloration/wound).   Neurological:  Negative for dizziness, speech difficulty, light-headedness and headaches.   Psychiatric/Behavioral:  Negative for agitation and confusion. The patient is not nervous/anxious.    Objective:     Vital Signs (Most Recent):  Temp: (!) 95.4 °F (35.2 °C) (05/31/23 0743)  Pulse: (!) 56 (05/31/23 0743)  Resp: 20 (05/31/23 0743)  BP: (!) 142/67 (05/31/23 0743)  SpO2: (!) 94 % (05/31/23 0743) Vital Signs (24h Range):  Temp:  [95.4 °F (35.2 °C)-97.6 °F (36.4 °C)] 95.4 °F (35.2 °C)  Pulse:  [49-56] 56  Resp:  [18-20] 20  SpO2:  [94 %-97 %] 94 %  BP: (107-142)/(54-67) 142/67     Weight: 119.1 kg (262 lb 9.1 oz)  Body mass index is 41.12 kg/m².    Intake/Output Summary (Last 24 hours) at 5/31/2023 0940  Last data filed at 5/31/2023 0218  Gross per 24 hour   Intake --   Output 250 ml   Net -250 ml           Physical Exam  Vitals and nursing note reviewed.   Constitutional:       General: She is not in acute distress.     Appearance: She is obese. She is not ill-appearing or toxic-appearing.   HENT:      Head: Normocephalic and atraumatic.   Neck:      Thyroid: Thyromegaly present.      Trachea: No tracheal deviation.   Cardiovascular:      Rate and Rhythm: Regular rhythm. Bradycardia present.       Pulses:           Dorsalis pedis pulses are 1+ on the right side and 1+ on the left side.      Heart sounds: Normal heart sounds.   Pulmonary:      Effort: Pulmonary effort is normal. No respiratory distress.      Breath sounds: No wheezing.   Abdominal:      General: Bowel sounds are normal. There is no distension.      Palpations: Abdomen is soft.      Tenderness: There is no abdominal tenderness. There is no rebound.   Musculoskeletal:      Cervical back: Normal range of motion.      Right lower leg: Tenderness present. 1+ Edema present.      Left lower leg: Tenderness present. 1+ Edema present.   Lymphadenopathy:      Cervical: No cervical adenopathy.   Skin:     General: Skin is warm.      Findings: Wound present.      Comments: Discoloration BLE; small abrasion/wound w/ pink base RLE - no drainage or warmth noted   Neurological:      Mental Status: She is alert and oriented to person, place, and time.   Psychiatric:         Mood and Affect: Mood normal.         Behavior: Behavior normal.         Thought Content: Thought content normal.           Significant Labs: A1C:   Recent Labs   Lab 03/23/23  1418   HGBA1C 8.1*       ABGs: No results for input(s): PH, PCO2, HCO3, POCSATURATED, BE, TOTALHB, COHB, METHB, O2HB, POCFIO2, PO2 in the last 48 hours.  Blood Culture: No results for input(s): LABBLOO in the last 48 hours.  CBC:   Recent Labs   Lab 05/30/23  0318 05/31/23  0331   WBC 2.23* 2.21*   HGB 9.4* 9.3*   HCT 30.4* 30.6*   PLT 81* 89*       CMP:   Recent Labs   Lab 05/30/23  0757 05/30/23  2000 05/31/23  0741    140 141   K 3.6 3.5 3.7    104 105   CO2 29 26 26   * 207* 169*   BUN 28* 29* 28*   CREATININE 2.5* 2.4* 2.4*   CALCIUM 8.9 8.9 8.7   ANIONGAP 9 10 10       Coagulation: No results for input(s): PT, INR, APTT in the last 48 hours.  Lipase: No results for input(s): LIPASE in the last 48 hours.  Lipid Panel: No results for input(s): CHOL, HDL, LDLCALC, TRIG, CHOLHDL in the last 48  hours.  Troponin: No results for input(s): TROPONINI, TROPONINIHS in the last 48 hours.  TSH:   Recent Labs   Lab 05/26/23 2046   TSH 3.930       Urine Culture: No results for input(s): LABURIN in the last 48 hours.  Urine Studies: No results for input(s): COLORU, APPEARANCEUA, PHUR, SPECGRAV, PROTEINUA, GLUCUA, KETONESU, BILIRUBINUA, OCCULTUA, NITRITE, UROBILINOGEN, LEUKOCYTESUR, RBCUA, WBCUA, BACTERIA, SQUAMEPITHEL, HYALINECASTS in the last 48 hours.    Invalid input(s): MIRYAM    Significant Imaging: I have reviewed all pertinent imaging results/findings within the past 24 hours.

## 2023-05-31 NOTE — PT/OT/SLP PROGRESS
Occupational Therapy  Missed Visit    Patient Name:  Sherin Ortiz   MRN:  [id number]    Patient not seen today secondary to Other (Comment). Nsg preparing patient for D/C; ambulance transportation set-up to arrive shortly.    5/31/2023

## 2023-05-31 NOTE — ASSESSMENT & PLAN NOTE
Improving   Hyperkalemia in the setting of LOREN.  Patient had peak potassium was 6.4 requiring aggressive medical management in the inpatient setting.   She is no longer requiring bicarbonate infusion, Lokelma or Lasix  Decrease renal function to q.12 hours-then daily

## 2023-05-31 NOTE — PLAN OF CARE
Problem: Adult Inpatient Plan of Care  Goal: Plan of Care Review  Outcome: Ongoing, Progressing     Problem: Adult Inpatient Plan of Care  Goal: Optimal Comfort and Wellbeing  Outcome: Ongoing, Progressing     Problem: Diabetes Comorbidity  Goal: Blood Glucose Level Within Targeted Range  Outcome: Ongoing, Progressing     Problem: Infection (Pneumonia)  Goal: Resolution of Infection Signs and Symptoms  Outcome: Ongoing, Progressing     Problem: Swallowing Impairment  Goal: Optimal Eating and Swallowing Without Aspiration  Outcome: Ongoing, Progressing     Problem: Skin Injury Risk Increased  Goal: Skin Health and Integrity  Outcome: Ongoing, Progressing     .Plan of care reviewed with the patient. Scheduled medicines given and the patient tolerated well. Fall and safety precautions taken and the standard interventions are in place. No acute distress reported in the shift. Patient's Accu Check was 207 mg/dl, covered with 1 Unit Insulin Aspart. Advised the patient to call for the assistance. Continued monitoring the patient.

## 2023-05-31 NOTE — ASSESSMENT & PLAN NOTE
Reports of dysphagia for several weeks prior to presentation.  ENT evaluation including flexible laryngoscopy was relatively unrevealing.    Remove NG    modified barium swallow-reviewed appreciate SLP recs   upper GI fluoroscopy and EGD afterwards if warranted.

## 2023-05-31 NOTE — PROGRESS NOTES
Nephrology Progress Note       Consult Requested By: Su Maddox*  Reason for Consult: CKD4 Hyperkalemia      SUBJECTIVE:        ?    Review of Systems   Constitutional:  Negative for chills and fever.   HENT:  Positive for sore throat. Negative for congestion.    Eyes:  Negative for blurred vision, double vision and photophobia.   Respiratory:  Negative for cough and shortness of breath.    Cardiovascular:  Negative for chest pain, palpitations and leg swelling.   Gastrointestinal:  Negative for abdominal pain, diarrhea, nausea and vomiting.   Genitourinary:  Negative for dysuria and urgency.   Musculoskeletal:  Positive for back pain. Negative for joint pain and myalgias.   Skin:  Negative for itching and rash.   Neurological:  Positive for weakness. Negative for dizziness, sensory change and headaches.   Endo/Heme/Allergies:  Negative for polydipsia. Does not bruise/bleed easily.   Psychiatric/Behavioral:  Negative for depression.       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    Benign essential hypertension 11/14/2012    Cataract     s/p phacoemulsification    Chronic kidney disease (CKD), stage III (moderate) 9/12/2013    Diabetic peripheral neuropathy associated with type 2 diabetes mellitus 11/14/2014    causing right hemiparesis    Gait disorder     Hyperlipidemia     Iritis - Both Eyes 6/10/2013    Kidney stone     Lymphedema     Morbid obesity with BMI of 40.0-44.9, adult 2/18/2015    Nephrolithiasis 4/20/2016    NS (nuclear sclerosis) 4/1/2013    Nuclear sclerosis - Both Eyes 4/29/2013    Preseptal cellulitis - Right Eye 4/29/2013    Proliferative diabetic retinopathy - Both Eyes 4/29/2013    Proliferative diabetic retinopathy, both eyes 4/1/2013    PSC (posterior subcapsular cataract) - Both Eyes 4/29/2013    S/P hernia repair 12/19/2012    TIA (transient ischemic attack) 11/18/2014    Tinea pedis 7/24/2012    Tinea pedis is present on  both feet.     Type 2 diabetes mellitus with diabetic polyneuropathy, with long-term current use of insulin 9/18/2015    Type 2 diabetes mellitus with renal manifestations, controlled 12/12/2013    Type 2 diabetes, controlled, with moderate nonproliferative diabetic retinopathy without macular edema 9/17/2015    Ulcer of left lower extremity, limited to breakdown of skin 7/8/2015    Unspecified cerebral artery occlusion with cerebral infarction 11/16/2014    Unspecified venous (peripheral) insufficiency     Ureteral stone with hydronephrosis 1/27/2016    UTI (lower urinary tract infection)     Vaginal infection     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     OBJECTIVE:     Vital Signs (Most Recent)  Vitals:    05/30/23 2331 05/31/23 0326 05/31/23 0743 05/31/23 1215   BP: 135/66 137/63 (!) 142/67 (!) 141/65   BP Location: Left arm Left arm Left arm Left arm   Patient Position: Lying Lying Lying Lying   Pulse: (!) 53 (!) 54 (!) 56 (!) 53   Resp: 18 18 20 20   Temp: 96.9 °F (36.1 °C) 97.4 °F (36.3 °C) (!) 95.4 °F (35.2 °C) 96.4 °F (35.8 °C)   TempSrc: Oral Axillary Axillary Axillary   SpO2: 96% 95% (!) 94% 98%   Weight:       Height:                       Medications:   ammonium lactate   Topical (Top) BID    cefTRIAXone (ROCEPHIN) IVPB  2 g Intravenous Q24H    folic acid  1 mg Oral Daily    metronidazole  500 mg Intravenous Q8H    mupirocin   Nasal BID    pantoprazole  40 mg Oral Daily           Physical Exam  Vitals and nursing note reviewed.   Constitutional:       General: She is not in acute distress.     Appearance: She is ill-appearing. She is not diaphoretic.   HENT:      Head: Normocephalic and atraumatic.      Mouth/Throat:      Pharynx: No oropharyngeal exudate.   Eyes:      General: No scleral icterus.     Conjunctiva/sclera: Conjunctivae normal.      Pupils: Pupils are equal, round, and reactive to light.   Cardiovascular:      Rate and Rhythm: Normal rate and regular rhythm.      Heart sounds: Normal  heart sounds. No murmur heard.  Pulmonary:      Effort: Pulmonary effort is normal. No respiratory distress.      Breath sounds: Normal breath sounds.   Abdominal:      General: Bowel sounds are normal. There is no distension.      Palpations: Abdomen is soft.      Tenderness: There is no abdominal tenderness.   Musculoskeletal:         General: Normal range of motion.      Cervical back: Normal range of motion and neck supple.   Skin:     General: Skin is warm and dry.      Findings: No erythema.   Neurological:      Mental Status: She is alert.      Cranial Nerves: No cranial nerve deficit.       Laboratory:  Recent Labs   Lab 05/29/23  0411 05/30/23  0318 05/31/23  0331   WBC 2.81* 2.23* 2.21*   HGB 9.2* 9.4* 9.3*   HCT 30.6* 30.4* 30.6*   PLT 84* 81* 89*   MONO 8.9  0.3 9.9  0.2* 8.6  0.2*       Recent Labs   Lab 05/27/23  0440 05/27/23  0801 05/28/23  0444 05/28/23  0805 05/29/23  0411 05/29/23  0749 05/30/23  0757 05/30/23  2000 05/31/23  0741     144   < > 141  141   < > 140  140   < > 141 140 141   K 5.7*  5.7*   < > 4.8  4.9   < > 3.6  3.5   < > 3.6 3.5 3.7   *  111*   < > 105  105   < > 104  104   < > 103 104 105   CO2 29  29   < > 26  26   < > 26  27   < > 29 26 26   BUN 25*  26*   < > 29*  30*   < > 29*  29*   < > 28* 29* 28*   CREATININE 2.0*  2.0*   < > 2.9*  2.9*   < > 2.7*  2.7*   < > 2.5* 2.4* 2.4*   CALCIUM 9.1  9.1   < > 8.9  8.9   < > 8.7  8.7   < > 8.9 8.9 8.7   PHOS 4.1  --  4.2  --  4.0  --   --   --   --     < > = values in this interval not displayed.         Diagnostic Results:  X-Ray: Reviewed  US: Reviewed  Echo: Reviewed  ASSESSMENT/PLAN:     1. CKD4  DM/HTN   -- B/L cr 2.0   Cr   2.7 to 2.5 -2.4 improving  Hyperkalemia improved as well   -- Daily Renal Function Panel  -- Avoid Hypotension.  -- Renally dose all meds  -- Please avoid nephrotoxins, including NSAIDs, aminoglycosides, IV contrast (unless absolutely necessary), gadolinium, fleets and other  phosphorous-based laxatives. Caution with antibiotics.    Hyperkalemia    -- US kidney no obstruction   -- bladder no retention   -- Lokelma   - stopped   -- avoid Acidosis Hyperchloremia    Lactulose to have BM helps with K+     2. HTN (I10) -  controled   3. Anemia of chronic kidney disease       Recent Labs   Lab 05/29/23  0411 05/30/23  0318 05/31/23  0331   HGB 9.2* 9.4* 9.3*   HCT 30.6* 30.4* 30.6*   PLT 84* 81* 89*         Iron   Lab Results   Component Value Date    IRON 81 05/28/2023    TIBC 284 05/28/2023    FERRITIN 119 05/28/2023       4. MBD (E88.9 M90.80) -  Recent Labs   Lab 05/29/23  0411 05/29/23  0749 05/31/23  0741   CALCIUM 8.7  8.7   < > 8.7   PHOS 4.0  --   --     < > = values in this interval not displayed.       Recent Labs   Lab 05/27/23  0440 05/28/23  0444 05/29/23  0411   MG 1.9 1.8 1.8         Lab Results   Component Value Date    .7 (H) 12/22/2022    CALCIUM 8.7 05/31/2023    CAION 1.22 12/15/2012    PHOS 4.0 05/29/2023     Lab Results   Component Value Date    KHYDDYXL17YG 18 (L) 07/06/2017       Lab Results   Component Value Date    CO2 26 05/31/2023       5. Nutrition/Hypoalbuminemia (E88.09) -    Recent Labs   Lab 05/28/23  0444 05/29/23  0411   LABPROT 11.7  --    ALBUMIN 2.2* 2.1*       Nepro with meals TID.        Thank you for allowing me to participate in care of your patient  With any question please call   Cesar Timmons MD     [facility]  RAISA Oliver MD,   MD JAYME Car MD E. V. Harmon, NP  [address]   [place], [address] [phone]  After hours answering service: [phone]

## 2023-05-31 NOTE — ASSESSMENT & PLAN NOTE
Advance Care Planning     Date: 05/26/2023    Code Status  In light of the patients advanced and life limiting illness,I engaged the the patient in a conversation about the patient's preferences for care  at the very end of life. The patient wishes to have a natural, peaceful death.  Along those lines, the patient does not wish to have CPR or other invasive treatments performed when her heart and/or breathing stops. I communicated to the patient that a DNR order would be placed in her medical record to reflect this preference.  I spent a total of 6 minutes engaging the patient in this advance care planning discussion.    LaPOST in paper chart.  Picture uploaded into media     Patient's family will continue to address goals of care with the patient.  We will continue an open discussion regarding patient's desires for further care and tentative plans for home hospice upon discharge.    Hospice consult placed for information session with the patient and daughter.      Advance Care Planning     Date: 05/30/2023    [facility]  I engaged the family in a conversation about advance care planning and we specifically addressed what the goals of care would be moving forward, in light of the patient's change in clinical status, specifically progressive physical and cognitive decline.  We did specifically address the patient's likely prognosis, which is fair .  We explored the patient's values and preferences for future care.  The family endorses that what is most important right now is to focus on remaining as independent as possible, extending life as long as possible, even it it means sacrificing quality and improvement in condition but with limits to invasive therapies    Accordingly, we have decided that the best plan to meet the patient's goals includes continuing with treatment    I did explain the role for hospice care at this stage of the patient's illness, including its ability to help the patient live with the best quality  of life possible.  We will not be making a hospice referral.    I spent a total of 20 minutes engaging the patient in this advance care planning discussion.

## 2023-05-31 NOTE — CONSULTS
Consult acknowledged. Chart reviewed, Pt discharging to SNF at this time.  Will schedule appointment with palliative outpatient clinic. MD aware of plan of care.

## 2023-06-01 ENCOUNTER — TELEPHONE (OUTPATIENT)
Dept: INTERNAL MEDICINE | Facility: CLINIC | Age: 78
End: 2023-06-01
Payer: MEDICARE

## 2023-06-01 ENCOUNTER — PATIENT OUTREACH (OUTPATIENT)
Dept: ADMINISTRATIVE | Facility: CLINIC | Age: 78
End: 2023-06-01
Payer: MEDICARE

## 2023-06-01 LAB
BACTERIA BLD CULT: NORMAL
BACTERIA BLD CULT: NORMAL
COPPER SERPL-MCNC: 1162 UG/L (ref 810–1990)

## 2023-06-01 NOTE — TELEPHONE ENCOUNTER
----- Message from Sarah Velasco sent at 6/1/2023 11:26 AM CDT -----  Contact: Wally lee/Gretchen [phone]  Wally is calling in regards to a detail order that was faxed over on 05/25. She is checking on the status of it. Please call.              Thank you

## 2023-06-01 NOTE — PLAN OF CARE
[facility] - Telemetry  Discharge Final Note    Primary Care Provider: Darby Baum MD    Expected Discharge Date: 5/31/2023    Final Discharge Note (most recent)       Final Note - 06/01/23 1621          Final Note    Assessment Type Final Discharge Note (P)      Anticipated Discharge Disposition Skilled Nursing Facility (P)    [facility]    Hospital Resources/Appts/Education Provided Appointments scheduled and added to AVS (P)         Post-Acute Status    Post-Acute Authorization Placement (P)      Post-Acute Placement Status Set-up Complete/Auth obtained (P)    [facility]    Discharge Delays PFC Arranged Transportation (P)                      Important Message from Medicare  Important Message from Medicare regarding Discharge Appeal Rights: Given to patient/caregiver, Explained to patient/caregiver, Signed/date by patient/caregiver     Date IMM was signed: 05/31/23  Time IMM was signed: 1007    Contact Info       [facility] - Nephrology 5th Fl   Specialty: Nephrology    [address]  [facility] [address]   Phone: [phone]       Next Steps: Follow up on 6/21/2023    Instructions: at 4:00pm; previously scheduled nephrology appointment with NP Kaity Baum MD   Specialty: Internal Medicine   Relationship: PCP - General    [address]  [facility] [address]   Phone: [phone]       Next Steps: Follow up on 6/29/2023    Instructions: at 8:40 AM; previously scheduled PCP appointment

## 2023-06-01 NOTE — TELEPHONE ENCOUNTER
Called Wally lee/Gretchen. Informed her we did not receive a fax from her. Wally did not have the right fax number. Fax is being resent/fax again to us.

## 2023-06-01 NOTE — PHYSICIAN QUERY
"  PT Name: Sherin Ortiz  MR #: [id number]     DOCUMENTATION CLARIFICATION      CDS/: Anjali Kim RN CCDS              Contact information:[email or web address]  This form is a permanent document in the medical record.     Query Date: June 1, 2023    By submitting this query, we are merely seeking further clarification of documentation.  Please utilize your independent clinical judgment when addressing the question(s) below.     The Medical Record contains the following:    Clinical Information Location in Medical Record     The cardiomediastinal silhouette is prominent, similar to the previous exam noting calcification of the aorta.There is obscuration of the bilateral costophrenic angles, left greater than right suggesting effusions.The trachea is midline.The  lungs are symmetrically expanded bilaterally with coarse interstitial attenuation bilaterally suggesting edema.Developing left lower lung zone consolidation not excluded.    Pleural effusion, left  Large left pleural effusion, initially thought to be infective focus or secondary to chronic aspiration.  On bedside ultrasound, appears to be simple left pleural effusion.  Pulmonology is following  No indication for thoracentesis at this time  Continuing ceftriaxone and metronidazole    Pancytopenia  Improving after initiating empiric antibiotics   Discontinue neutropenic precautions  Hematology is following    Ceftriaxone IV  Vancomycin IV  Flagyl IV       CXR 5/26                Hospital medicine PN 5/29                Hospital medicine PN 5/29          MAR     Please clarify the documentation of "thought to be infective focus or secondary to to chronic aspiration" regarding the Pleural Effusion.  Thank you.       [ x  ] Acute Aspiration Pneumonia     [   ] Acute Pneumonia, unspecified     [   ] Chronic Aspiration, without acute infection, Empiric Antibiotics only     [   ] Other etiology (please specify):___________________     [  ] Clinically " Undetermined             Please document in your progress notes daily for the duration of treatment, until resolved, and include in your discharge summary.

## 2023-06-02 ENCOUNTER — HOSPITAL ENCOUNTER (INPATIENT)
Facility: HOSPITAL | Age: 78
LOS: 4 days | Discharge: HOSPICE | DRG: 871 | End: 2023-06-06
Attending: EMERGENCY MEDICINE | Admitting: INTERNAL MEDICINE
Payer: MEDICARE

## 2023-06-02 DIAGNOSIS — R07.9 CHEST PAIN: ICD-10-CM

## 2023-06-02 DIAGNOSIS — T68.XXXA HYPOTHERMIA, INITIAL ENCOUNTER: ICD-10-CM

## 2023-06-02 DIAGNOSIS — A41.9 SEPSIS, DUE TO UNSPECIFIED ORGANISM, UNSPECIFIED WHETHER ACUTE ORGAN DYSFUNCTION PRESENT: ICD-10-CM

## 2023-06-02 DIAGNOSIS — R00.1 BRADYCARDIA: ICD-10-CM

## 2023-06-02 DIAGNOSIS — J18.9 PNEUMONIA DUE TO INFECTIOUS ORGANISM, UNSPECIFIED LATERALITY, UNSPECIFIED PART OF LUNG: ICD-10-CM

## 2023-06-02 DIAGNOSIS — R41.82 ALTERED MENTAL STATUS, UNSPECIFIED ALTERED MENTAL STATUS TYPE: Primary | ICD-10-CM

## 2023-06-02 PROBLEM — Z86.73 HX OF TRANSIENT ISCHEMIC ATTACK (TIA): Chronic | Status: ACTIVE | Noted: 2018-01-22

## 2023-06-02 PROBLEM — I50.30 (HFPEF) HEART FAILURE WITH PRESERVED EJECTION FRACTION: Chronic | Status: ACTIVE | Noted: 2018-01-22

## 2023-06-02 PROBLEM — I77.9 PAOD (PERIPHERAL ARTERIAL OCCLUSIVE DISEASE): Chronic | Status: ACTIVE | Noted: 2017-07-06

## 2023-06-02 LAB
ALBUMIN SERPL BCP-MCNC: 2.4 G/DL (ref 3.5–5.2)
ALLENS TEST: ABNORMAL
ALLENS TEST: NORMAL
ALLENS TEST: NORMAL
ALP SERPL-CCNC: 177 U/L (ref 55–135)
ALT SERPL W/O P-5'-P-CCNC: 33 U/L (ref 10–44)
ANION GAP SERPL CALC-SCNC: 10 MMOL/L (ref 8–16)
ANION GAP SERPL CALC-SCNC: 5 MMOL/L (ref 8–16)
AST SERPL-CCNC: 54 U/L (ref 10–40)
BACTERIA #/AREA URNS AUTO: ABNORMAL /HPF
BASOPHILS # BLD AUTO: 0.01 K/UL (ref 0–0.2)
BASOPHILS NFR BLD: 0.5 % (ref 0–1.9)
BILIRUB SERPL-MCNC: 0.3 MG/DL (ref 0.1–1)
BILIRUB UR QL STRIP: NEGATIVE
BNP SERPL-MCNC: 263 PG/ML (ref 0–99)
BUN SERPL-MCNC: 25 MG/DL (ref 8–23)
BUN SERPL-MCNC: 26 MG/DL (ref 8–23)
BUN SERPL-MCNC: 27 MG/DL (ref 6–30)
CALCIUM SERPL-MCNC: 8.6 MG/DL (ref 8.7–10.5)
CALCIUM SERPL-MCNC: 9.1 MG/DL (ref 8.7–10.5)
CHLORIDE SERPL-SCNC: 103 MMOL/L (ref 95–110)
CHLORIDE SERPL-SCNC: 104 MMOL/L (ref 95–110)
CHLORIDE SERPL-SCNC: 107 MMOL/L (ref 95–110)
CLARITY UR REFRACT.AUTO: CLEAR
CO2 SERPL-SCNC: 26 MMOL/L (ref 23–29)
CO2 SERPL-SCNC: 30 MMOL/L (ref 23–29)
COLOR UR AUTO: YELLOW
CREAT SERPL-MCNC: 2 MG/DL (ref 0.5–1.4)
CREAT SERPL-MCNC: 2.1 MG/DL (ref 0.5–1.4)
CREAT SERPL-MCNC: 2.3 MG/DL (ref 0.5–1.4)
CREAT UR-MCNC: 59 MG/DL (ref 15–325)
DIFFERENTIAL METHOD: ABNORMAL
EOSINOPHIL # BLD AUTO: 0 K/UL (ref 0–0.5)
EOSINOPHIL NFR BLD: 1.1 % (ref 0–8)
ERYTHROCYTE [DISTWIDTH] IN BLOOD BY AUTOMATED COUNT: 18.8 % (ref 11.5–14.5)
EST. GFR  (NO RACE VARIABLE): 23.4 ML/MIN/1.73 M^2
EST. GFR  (NO RACE VARIABLE): 24.8 ML/MIN/1.73 M^2
GLUCOSE SERPL-MCNC: 159 MG/DL (ref 70–110)
GLUCOSE SERPL-MCNC: 161 MG/DL (ref 70–110)
GLUCOSE SERPL-MCNC: 179 MG/DL (ref 70–110)
GLUCOSE UR QL STRIP: ABNORMAL
HCO3 UR-SCNC: 30.2 MMOL/L (ref 24–28)
HCT VFR BLD AUTO: 33.8 % (ref 37–48.5)
HCT VFR BLD CALC: 32 %PCV (ref 36–54)
HGB BLD-MCNC: 10.2 G/DL (ref 12–16)
HGB UR QL STRIP: NEGATIVE
HYALINE CASTS UR QL AUTO: 1 /LPF
IMM GRANULOCYTES # BLD AUTO: 0 K/UL (ref 0–0.04)
IMM GRANULOCYTES NFR BLD AUTO: 0 % (ref 0–0.5)
INFLUENZA A, MOLECULAR: NOT DETECTED
INFLUENZA B, MOLECULAR: NOT DETECTED
KETONES UR QL STRIP: NEGATIVE
LACTATE SERPL-SCNC: 1 MMOL/L (ref 0.5–2.2)
LACTATE SERPL-SCNC: 1 MMOL/L (ref 0.5–2.2)
LDH SERPL L TO P-CCNC: 0.88 MMOL/L (ref 0.5–2.2)
LDH SERPL L TO P-CCNC: 2.06 MMOL/L (ref 0.5–2.2)
LEUKOCYTE ESTERASE UR QL STRIP: NEGATIVE
LYMPHOCYTES # BLD AUTO: 0.5 K/UL (ref 1–4.8)
LYMPHOCYTES NFR BLD: 26.5 % (ref 18–48)
MAGNESIUM SERPL-MCNC: 1.8 MG/DL (ref 1.6–2.6)
MCH RBC QN AUTO: 24.6 PG (ref 27–31)
MCHC RBC AUTO-ENTMCNC: 30.2 G/DL (ref 32–36)
MCV RBC AUTO: 81 FL (ref 82–98)
METHYLMALONATE SERPL-SCNC: 0.41 UMOL/L
MICROSCOPIC COMMENT: ABNORMAL
MONOCYTES # BLD AUTO: 0.2 K/UL (ref 0.3–1)
MONOCYTES NFR BLD: 11.4 % (ref 4–15)
NEUTROPHILS # BLD AUTO: 1.1 K/UL (ref 1.8–7.7)
NEUTROPHILS NFR BLD: 60.5 % (ref 38–73)
NITRITE UR QL STRIP: NEGATIVE
NRBC BLD-RTO: 2 /100 WBC
PCO2 BLDA: 61 MMHG (ref 35–45)
PH SMN: 7.3 [PH] (ref 7.35–7.45)
PH UR STRIP: 6 [PH] (ref 5–8)
PHOSPHATE SERPL-MCNC: 3.3 MG/DL (ref 2.7–4.5)
PLATELET # BLD AUTO: 90 K/UL (ref 150–450)
PMV BLD AUTO: 9.9 FL (ref 9.2–12.9)
PO2 BLDA: 63 MMHG (ref 40–60)
POC BE: 4 MMOL/L
POC IONIZED CALCIUM: 1.24 MMOL/L (ref 1.06–1.42)
POC SATURATED O2: 89 % (ref 95–100)
POC TCO2 (MEASURED): 32 MMOL/L (ref 23–29)
POC TCO2: 32 MMOL/L (ref 24–29)
POCT GLUCOSE: 156 MG/DL (ref 70–110)
POCT GLUCOSE: 188 MG/DL (ref 70–110)
POTASSIUM BLD-SCNC: 3.7 MMOL/L (ref 3.5–5.1)
POTASSIUM SERPL-SCNC: 3.7 MMOL/L (ref 3.5–5.1)
POTASSIUM SERPL-SCNC: 3.8 MMOL/L (ref 3.5–5.1)
PROCALCITONIN SERPL IA-MCNC: 0.06 NG/ML
PROT SERPL-MCNC: 6.1 G/DL (ref 6–8.4)
PROT UR QL STRIP: ABNORMAL
PROT UR-MCNC: 160 MG/DL (ref 0–15)
PROT/CREAT UR: 2.71 MG/G{CREAT} (ref 0–0.2)
RBC # BLD AUTO: 4.15 M/UL (ref 4–5.4)
RBC #/AREA URNS AUTO: 5 /HPF (ref 0–4)
RSV AG BY MOLECULAR METHOD: NOT DETECTED
SAMPLE: ABNORMAL
SAMPLE: ABNORMAL
SAMPLE: NORMAL
SAMPLE: NORMAL
SARS-COV-2 RNA RESP QL NAA+PROBE: NOT DETECTED
SITE: ABNORMAL
SITE: NORMAL
SITE: NORMAL
SODIUM BLD-SCNC: 145 MMOL/L (ref 136–145)
SODIUM SERPL-SCNC: 139 MMOL/L (ref 136–145)
SODIUM SERPL-SCNC: 143 MMOL/L (ref 136–145)
SODIUM UR-SCNC: 55 MMOL/L (ref 20–250)
SP GR UR STRIP: 1.01 (ref 1–1.03)
SQUAMOUS #/AREA URNS AUTO: 6 /HPF
T4 FREE SERPL-MCNC: 0.87 NG/DL (ref 0.71–1.51)
TROPONIN I SERPL DL<=0.01 NG/ML-MCNC: 0.01 NG/ML (ref 0–0.03)
TSH SERPL DL<=0.005 MIU/L-ACNC: 5.77 UIU/ML (ref 0.4–4)
URN SPEC COLLECT METH UR: ABNORMAL
WBC # BLD AUTO: 1.85 K/UL (ref 3.9–12.7)
WBC #/AREA URNS AUTO: 3 /HPF (ref 0–5)
YEAST UR QL AUTO: ABNORMAL

## 2023-06-02 PROCEDURE — 83735 ASSAY OF MAGNESIUM: CPT | Performed by: INTERNAL MEDICINE

## 2023-06-02 PROCEDURE — 84300 ASSAY OF URINE SODIUM: CPT

## 2023-06-02 PROCEDURE — 99223 1ST HOSP IP/OBS HIGH 75: CPT | Mod: AI,25,, | Performed by: INTERNAL MEDICINE

## 2023-06-02 PROCEDURE — 99900035 HC TECH TIME PER 15 MIN (STAT)

## 2023-06-02 PROCEDURE — 99497 ADVNCD CARE PLAN 30 MIN: CPT | Mod: 25,,, | Performed by: INTERNAL MEDICINE

## 2023-06-02 PROCEDURE — 20600001 HC STEP DOWN PRIVATE ROOM

## 2023-06-02 PROCEDURE — 0241U SARS-COV2 (COVID) WITH FLU/RSV BY PCR: CPT

## 2023-06-02 PROCEDURE — 82803 BLOOD GASES ANY COMBINATION: CPT

## 2023-06-02 PROCEDURE — 25000003 PHARM REV CODE 250: Performed by: INTERNAL MEDICINE

## 2023-06-02 PROCEDURE — 99223 1ST HOSP IP/OBS HIGH 75: CPT | Mod: ,,, | Performed by: PSYCHIATRY & NEUROLOGY

## 2023-06-02 PROCEDURE — 84100 ASSAY OF PHOSPHORUS: CPT | Performed by: INTERNAL MEDICINE

## 2023-06-02 PROCEDURE — 82570 ASSAY OF URINE CREATININE: CPT

## 2023-06-02 PROCEDURE — 83605 ASSAY OF LACTIC ACID: CPT | Mod: 91 | Performed by: INTERNAL MEDICINE

## 2023-06-02 PROCEDURE — 99291 CRITICAL CARE FIRST HOUR: CPT | Mod: 25

## 2023-06-02 PROCEDURE — 93010 EKG 12-LEAD: ICD-10-PCS | Mod: ,,, | Performed by: INTERNAL MEDICINE

## 2023-06-02 PROCEDURE — 99223 PR INITIAL HOSPITAL CARE,LEVL III: ICD-10-PCS | Mod: AI,25,, | Performed by: INTERNAL MEDICINE

## 2023-06-02 PROCEDURE — 84443 ASSAY THYROID STIM HORMONE: CPT

## 2023-06-02 PROCEDURE — 99291 PR CRITICAL CARE, E/M 30-74 MINUTES: ICD-10-PCS | Mod: ,,, | Performed by: EMERGENCY MEDICINE

## 2023-06-02 PROCEDURE — 83605 ASSAY OF LACTIC ACID: CPT

## 2023-06-02 PROCEDURE — 96374 THER/PROPH/DIAG INJ IV PUSH: CPT

## 2023-06-02 PROCEDURE — 25000003 PHARM REV CODE 250

## 2023-06-02 PROCEDURE — 84484 ASSAY OF TROPONIN QUANT: CPT

## 2023-06-02 PROCEDURE — 93005 ELECTROCARDIOGRAM TRACING: CPT

## 2023-06-02 PROCEDURE — 99497 PR ADVNCD CARE PLAN 30 MIN: ICD-10-PCS | Mod: 25,,, | Performed by: INTERNAL MEDICINE

## 2023-06-02 PROCEDURE — 84439 ASSAY OF FREE THYROXINE: CPT

## 2023-06-02 PROCEDURE — 84145 PROCALCITONIN (PCT): CPT | Performed by: INTERNAL MEDICINE

## 2023-06-02 PROCEDURE — 85025 COMPLETE CBC W/AUTO DIFF WBC: CPT

## 2023-06-02 PROCEDURE — 80053 COMPREHEN METABOLIC PANEL: CPT

## 2023-06-02 PROCEDURE — 80048 BASIC METABOLIC PNL TOTAL CA: CPT | Mod: XB | Performed by: INTERNAL MEDICINE

## 2023-06-02 PROCEDURE — 99223 PR INITIAL HOSPITAL CARE,LEVL III: ICD-10-PCS | Mod: ,,, | Performed by: PSYCHIATRY & NEUROLOGY

## 2023-06-02 PROCEDURE — 93010 ELECTROCARDIOGRAM REPORT: CPT | Mod: ,,, | Performed by: INTERNAL MEDICINE

## 2023-06-02 PROCEDURE — 63600175 PHARM REV CODE 636 W HCPCS: Performed by: INTERNAL MEDICINE

## 2023-06-02 PROCEDURE — 87040 BLOOD CULTURE FOR BACTERIA: CPT | Mod: 59

## 2023-06-02 PROCEDURE — 99291 CRITICAL CARE FIRST HOUR: CPT | Mod: ,,, | Performed by: EMERGENCY MEDICINE

## 2023-06-02 PROCEDURE — 83605 ASSAY OF LACTIC ACID: CPT | Performed by: INTERNAL MEDICINE

## 2023-06-02 PROCEDURE — 63600175 PHARM REV CODE 636 W HCPCS

## 2023-06-02 PROCEDURE — 83880 ASSAY OF NATRIURETIC PEPTIDE: CPT

## 2023-06-02 PROCEDURE — 81001 URINALYSIS AUTO W/SCOPE: CPT

## 2023-06-02 RX ORDER — PANTOPRAZOLE SODIUM 40 MG/1
40 TABLET, DELAYED RELEASE ORAL DAILY
Status: DISCONTINUED | OUTPATIENT
Start: 2023-06-02 | End: 2023-06-03

## 2023-06-02 RX ORDER — HEPARIN SODIUM 5000 [USP'U]/ML
5000 INJECTION, SOLUTION INTRAVENOUS; SUBCUTANEOUS EVERY 8 HOURS
Status: DISCONTINUED | OUTPATIENT
Start: 2023-06-02 | End: 2023-06-02

## 2023-06-02 RX ORDER — TALC
6 POWDER (GRAM) TOPICAL NIGHTLY PRN
Status: DISCONTINUED | OUTPATIENT
Start: 2023-06-02 | End: 2023-06-06 | Stop reason: HOSPADM

## 2023-06-02 RX ORDER — ATORVASTATIN CALCIUM 40 MG/1
40 TABLET, FILM COATED ORAL NIGHTLY
Status: DISCONTINUED | OUTPATIENT
Start: 2023-06-02 | End: 2023-06-03

## 2023-06-02 RX ORDER — INSULIN ASPART 100 [IU]/ML
0-5 INJECTION, SOLUTION INTRAVENOUS; SUBCUTANEOUS
Status: DISCONTINUED | OUTPATIENT
Start: 2023-06-02 | End: 2023-06-03

## 2023-06-02 RX ORDER — NAPROXEN SODIUM 220 MG/1
81 TABLET, FILM COATED ORAL DAILY
Status: DISCONTINUED | OUTPATIENT
Start: 2023-06-02 | End: 2023-06-03

## 2023-06-02 RX ORDER — GLUCAGON 1 MG
1 KIT INJECTION
Status: DISCONTINUED | OUTPATIENT
Start: 2023-06-02 | End: 2023-06-06 | Stop reason: HOSPADM

## 2023-06-02 RX ORDER — SODIUM CHLORIDE 0.9 % (FLUSH) 0.9 %
10 SYRINGE (ML) INJECTION EVERY 12 HOURS PRN
Status: DISCONTINUED | OUTPATIENT
Start: 2023-06-02 | End: 2023-06-06 | Stop reason: HOSPADM

## 2023-06-02 RX ORDER — DEXTROSE 40 %
15 GEL (GRAM) ORAL
Status: DISCONTINUED | OUTPATIENT
Start: 2023-06-02 | End: 2023-06-06 | Stop reason: HOSPADM

## 2023-06-02 RX ORDER — DEXTROSE 40 %
30 GEL (GRAM) ORAL
Status: DISCONTINUED | OUTPATIENT
Start: 2023-06-02 | End: 2023-06-06 | Stop reason: HOSPADM

## 2023-06-02 RX ORDER — NALOXONE HCL 0.4 MG/ML
0.02 VIAL (ML) INJECTION
Status: DISCONTINUED | OUTPATIENT
Start: 2023-06-02 | End: 2023-06-06 | Stop reason: HOSPADM

## 2023-06-02 RX ADMIN — SODIUM CHLORIDE, POTASSIUM CHLORIDE, SODIUM LACTATE AND CALCIUM CHLORIDE 1000 ML: 600; 310; 30; 20 INJECTION, SOLUTION INTRAVENOUS at 02:06

## 2023-06-02 RX ADMIN — AZITHROMYCIN MONOHYDRATE 500 MG: 500 INJECTION, POWDER, LYOPHILIZED, FOR SOLUTION INTRAVENOUS at 04:06

## 2023-06-02 RX ADMIN — PIPERACILLIN AND TAZOBACTAM 4.5 G: 4; .5 INJECTION, POWDER, LYOPHILIZED, FOR SOLUTION INTRAVENOUS; PARENTERAL at 11:06

## 2023-06-02 RX ADMIN — SODIUM CHLORIDE, POTASSIUM CHLORIDE, SODIUM LACTATE AND CALCIUM CHLORIDE 1000 ML: 600; 310; 30; 20 INJECTION, SOLUTION INTRAVENOUS at 11:06

## 2023-06-02 RX ADMIN — PIPERACILLIN AND TAZOBACTAM 4.5 G: 4; .5 INJECTION, POWDER, LYOPHILIZED, FOR SOLUTION INTRAVENOUS; PARENTERAL at 09:06

## 2023-06-02 RX ADMIN — INSULIN DETEMIR 10 UNITS: 100 INJECTION, SOLUTION SUBCUTANEOUS at 09:06

## 2023-06-02 RX ADMIN — VANCOMYCIN HYDROCHLORIDE 2000 MG: 10 INJECTION, POWDER, LYOPHILIZED, FOR SOLUTION INTRAVENOUS at 12:06

## 2023-06-02 NOTE — H&P
"Chris Replaced by [facility] - Emergency Dept  [facility] Medicine  History & Physical    Patient Name: Sherin Ortiz  MRN: [id number]  Patient Class: IP- Inpatient   Admission Date: 6/2/2023  Attending Physician: Melissa Alcantar MD   Primary Care Provider: Darby Baum MD        Patient information was obtained from patient, relative(s), past medical records, and ER records.     Subjective:     Principal Problem:Severe sepsis    Chief Complaint:  Chief Complaint   Patient presents with    Altered Mental Status     Arrived ems from [facility] with c/o "unresponsive per staff", on ems arrival pt not responsive to painful stimuli, received 1 mg narcan by EMS, eyes now open with pt looking around, per staff baseline is verbal, LSN 730am, found by staff at 0950am nonverbal with difficulty breathing       HPI: 78 y.o. female w/ h/o stroke, HFpEF (60%), PHTN, CKD 4, DM2, HTN, HLD; p/w AMS.  Hx taken mostly from son & daughter.  Pt Px from [facility] w/ GCS 7; last known normal was at 07:30 today by nursing staff.  Pt was found nonverbal at 09:50 w/ shallow respirations.  Stroke code was activated in ED w/ initial head scans u/r; noted that pt isn't a candidate for CTA; eval'd to unlikely be a stroke per Stroke team.  Pt also hypothermic & in AF on arrival.  On arrival to ED, pt remains nonverbal & unable to provide Hx, tho pt opens eyes to verbal stimuli & withdraws to painful stimuli.  Pt mentation improved s/p passive rewarming w/ bear guccigger.    Notably, pt was recently hospitalized 05/26-05/31 for dysphagia, found to have large loculated L pleural effusion, & found to be hypothermic, pancytopenic, & hyperkalemic; improved s/p empiric Abx. Pt was also hospitalized 04/28-05/05 for SOB & weakness, found to have LLL CAP; improved s/p Abx.    ED course: T 90.1 F, HR 60, /80, 99% ORA.  Labs u/r except for WBC 1.85, Hb 10.2 (b/l 9-10), Plt 90, Cr 2.1 (b/l 2.0-2.1), , TSH 5.769.  UA u/r.  CXR w/ opacification " of L lung base likely representing combo of pleural effusion w/ atelectasis/consolidation; similar patchy airspace & interstitial type opacities at R lung base.  CTH w/o definite acute ICH, tho limited 2/2 motion artifact.  Given IVF 2 L, started on vanc/pip-tazo.      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    Benign essential hypertension 11/14/2012    Cataract     s/p phacoemulsification    Chronic kidney disease (CKD), stage III (moderate) 9/12/2013    Diabetic peripheral neuropathy associated with type 2 diabetes mellitus 11/14/2014    causing right hemiparesis    Gait disorder     Hyperlipidemia     Iritis - Both Eyes 6/10/2013    Kidney stone     Lymphedema     Morbid obesity with BMI of 40.0-44.9, adult 2/18/2015    Nephrolithiasis 4/20/2016    NS (nuclear sclerosis) 4/1/2013    Nuclear sclerosis - Both Eyes 4/29/2013    Preseptal cellulitis - Right Eye 4/29/2013    Proliferative diabetic retinopathy - Both Eyes 4/29/2013    Proliferative diabetic retinopathy, both eyes 4/1/2013    PSC (posterior subcapsular cataract) - Both Eyes 4/29/2013    S/P hernia repair 12/19/2012    TIA (transient ischemic attack) 11/18/2014    Tinea pedis 7/24/2012    Tinea pedis is present on both feet.     Type 2 diabetes mellitus with diabetic polyneuropathy, with long-term current use of insulin 9/18/2015    Type 2 diabetes mellitus with renal manifestations, controlled 12/12/2013    Type 2 diabetes, controlled, with moderate nonproliferative diabetic retinopathy without macular edema 9/17/2015    Ulcer of left lower extremity, limited to breakdown of skin 7/8/2015    Unspecified cerebral artery occlusion with cerebral infarction 11/16/2014    Unspecified venous (peripheral) insufficiency     Ureteral stone with hydronephrosis 1/27/2016    UTI (lower urinary tract infection)     Vaginal infection     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       Past Surgical History:    Procedure Laterality Date    ABLATION Bilateral 6/23/2022    Procedure: Ablation;  Surgeon: Vahid Farfan MD;  Location: [facility] CATH LAB/EP;  Service: Cardiology;  Laterality: Bilateral;    ABLATION Right 11/17/2022    Procedure: Ablation;  Surgeon: Vahid Farfan MD;  Location: [facility] CATH LAB/EP;  Service: Cardiology;  Laterality: Right;    APPENDECTOMY      CATARACT EXTRACTION W/  INTRAOCULAR LENS IMPLANT Left 5/21/2013    CATARACT EXTRACTION W/  INTRAOCULAR LENS IMPLANT Right 6/4/2013    CHOLECYSTECTOMY      COLONOSCOPY  12/22/2005    normal    COLONOSCOPY N/A 7/14/2022    Procedure: COLONOSCOPY;  Surgeon: Kannan Mace MD;  Location: [facility] ENDO;  Service: Endoscopy;  Laterality: N/A;    ESOPHAGOGASTRODUODENOSCOPY  12/21/2015    hiatal hernia, Schatzki ring    ESOPHAGOGASTRODUODENOSCOPY N/A 7/13/2022    Procedure: EGD (ESOPHAGOGASTRODUODENOSCOPY);  Surgeon: Kannan Mace MD;  Location: [facility];  Service: Endoscopy;  Laterality: N/A;    EYE SURGERY Bilateral 2008    laser surgery both eyes    INTRALUMINAL GASTROINTESTINAL TRACT IMAGING VIA CAPSULE N/A 7/15/2022    Procedure: IMAGING PROCEDURE, GI TRACT, INTRALUMINAL, VIA CAPSULE;  Surgeon: Kannan Mace MD;  Location: [facility];  Service: Endoscopy;  Laterality: N/A;    NASAL SEPTUM SURGERY      SMALL BOWEL ENTEROSCOPY N/A 7/18/2022    Procedure: ENTEROSCOPY Upper SBE;  Surgeon: Salo Frank MD;  Location: [facility];  Service: Endoscopy;  Laterality: N/A;    SUBTOTAL COLECTOMY  12/13/2012    transverse colon, for incarcerated umbilical hernia, Dr. Kat Bower       Review of patient's allergies indicates:   Allergen Reactions    Penicillins Hives     Other reaction(s): Hives  Patient has received cefdinir, ceftriaxone, cefazolin and cefepime in the past with no documented reactions    Sulfa (sulfonamide antibiotics) Other (See Comments)     Eyad, pt states her doctor told her the shakes were possibly caused by an allergy to sulfa        No current facility-administered medications on file prior to encounter.     Current Outpatient Medications on File Prior to Encounter   Medication Sig    amLODIPine (NORVASC) 10 MG tablet Take 1 tablet (10 mg total) by mouth once daily.    ammonium lactate (LAC-HYDRIN) 12 % lotion Apply topically 2 (two) times daily.    aspirin 81 MG Chew Chew 1 tablet (81 mg total) by mouth once daily.    atorvastatin (LIPITOR) 40 MG tablet Take 1 tablet (40 mg total) by mouth every evening.    blood sugar diagnostic (TRUE METRIX GLUCOSE TEST STRIP) Strp TEST BLOOD SUGAR TWICE DAILY    diclofenac sodium (VOLTAREN) 1 % Gel Apply 2 g topically once daily. Apply to L Thumb as needed    furosemide (LASIX) 40 MG tablet Take 1 tablet (40 mg total) by mouth daily as needed (For worsening shortness of breath, swelling, or 3lb weight gain on scale).    insulin glargine (LANTUS U-100 INSULIN) 100 unit/mL injection Inject 15 Units into the skin every evening.    lancets (TRUEPLUS LANCETS) 33 gauge Misc TEST TWO TIMES DAILY    lisinopriL (PRINIVIL,ZESTRIL) 5 MG tablet Take 1 tablet (5 mg total) by mouth once daily.    metroNIDAZOLE (FLAGYL) 500 MG tablet Take 1 tablet (500 mg total) by mouth 3 (three) times daily. for 5 days    pantoprazole (PROTONIX) 40 MG tablet Take 1 tablet (40 mg total) by mouth once daily.    sodium bicarbonate 650 MG tablet Take 1 tablet (650 mg total) by mouth 2 (two) times daily.    [DISCONTINUED] blood glucose control, high (TRUE METRIX LEVEL 3) Soln Used to calibrate weekly     Family History       Problem Relation (Age of Onset)    Cataracts Sister, Brother    Diabetes Sister    Heart disease Brother    Leukemia Mother          Tobacco Use    Smoking status: Former     Packs/day: 0.50     Years: 15.00     Pack years: 7.50     Types: Cigarettes     Quit date: 1982     Years since quittin.9    Smokeless tobacco: Former    Tobacco comments:     smoked one pack per week   Substance and Sexual Activity     Alcohol use: No    Drug use: No    Sexual activity: Not Currently     Review of Systems   Constitutional:  Negative for chills and fever.   HENT:  Negative for congestion and sore throat.    Eyes:  Negative for pain and visual disturbance.   Respiratory:  Negative for cough and shortness of breath.    Cardiovascular:  Positive for leg swelling. Negative for chest pain.   Gastrointestinal:  Negative for abdominal pain, nausea and vomiting.   Genitourinary:  Negative for difficulty urinating and dysuria.   Musculoskeletal:  Positive for gait problem (wheelchair).   Skin:  Positive for wound.   Neurological:  Negative for dizziness and headaches.   Objective:     Vital Signs (Most Recent):  Temp: (!) 92.7 °F (33.7 °C) (06/02/23 1611)  Pulse: 64 (06/02/23 1611)  Resp: 17 (06/02/23 1611)  BP: (!) 124/58 (06/02/23 1500)  SpO2: 96 % (06/02/23 1611) Vital Signs (24h Range):  Temp:  [88.9 °F (31.6 °C)-92.7 °F (33.7 °C)] 92.7 °F (33.7 °C)  Pulse:  [53-64] 64  Resp:  [13-20] 17  SpO2:  [95 %-99 %] 96 %  BP: ()/(50-80) 124/58     Weight: 118.8 kg (262 lb)  Body mass index is 41.04 kg/m².     Physical Exam  Constitutional:       General: She is not in acute distress.     Appearance: She is obese. She is ill-appearing (chronically).   HENT:      Head: Normocephalic and atraumatic. No raccoon eyes.      Comments: Poor dentition.  Difficulty with articulation.      Nose: Nose normal. No congestion or rhinorrhea.   Eyes:      General: No scleral icterus.        Right eye: No discharge.         Left eye: No discharge.      Extraocular Movements: Extraocular movements intact.      Comments: Tracking appropriately.   Cardiovascular:      Rate and Rhythm: Regular rhythm. Bradycardia present.      Comments: PVCs.   Pulmonary:      Effort: Pulmonary effort is normal. No respiratory distress.      Breath sounds: No wheezing.   Abdominal:      General: Abdomen is flat.      Palpations: Abdomen is soft.      Tenderness: There is no  abdominal tenderness. There is no guarding.   Musculoskeletal:      Cervical back: Neck supple.      Right lower leg: Edema present.      Left lower leg: Edema present.      Comments: 2+ pitting edema up to knee. Chronic venous insufficiency skin changes with shallow ulcers, no discharge or fluctuance.   Lymphadenopathy:      Cervical: No cervical adenopathy.   Skin:     General: Skin is warm and dry.      Coloration: Skin is pale.      Findings: No bruising.      Comments: Bearhugger on.    Neurological:      Gait: Gait abnormal.      Comments: Chronically wheelchair bound.  3/5 strength in RUE, 2/5 strength in LUE.  Answers yes and no questions appropriately.   Alert to name.   CN II-IV intact.   Psychiatric:         Mood and Affect: Mood normal.         Behavior: Behavior normal.              Significant Labs: All pertinent labs within the past 24 hours have been reviewed.  CBC:   Recent Labs   Lab 06/02/23  1106 06/02/23  1109   WBC  --  1.85*   HGB  --  10.2*   HCT 32* 33.8*   PLT  --  90*     CMP:   Recent Labs   Lab 06/02/23  1109      K 3.7      CO2 26   *   BUN 25*   CREATININE 2.1*   CALCIUM 9.1   PROT 6.1   ALBUMIN 2.4*   BILITOT 0.3   ALKPHOS 177*   AST 54*   ALT 33   ANIONGAP 10     Magnesium: No results for input(s): MG in the last 48 hours.    Significant Imaging: I have reviewed all pertinent imaging results/findings within the past 24 hours.    Assessment/Plan:      * Severe sepsis  Pleural effusion, left    This patient does have evidence of infective focus.  My overall impression is sepsis. Source: Respiratory. Antibiotics given-   Antibiotics (72h ago, onward)      Start     Stop Route Frequency Ordered    06/02/23 2000  piperacillin-tazobactam (ZOSYN) 4.5 g in dextrose 5 % in water (D5W) 5 % 100 mL IVPB (MB+)         -- IV Every 8 hours (non-standard times) 06/02/23 1425    06/02/23 1600  azithromycin (ZITHROMAX) 500 mg in dextrose 5 % (D5W) 250 mL IVPB (Vial-Mate)         --  IV Every 24 hours (non-standard times) 06/02/23 1459    06/02/23 1524  vancomycin - pharmacy to dose  (vancomycin IVPB)        See Hyperspace for full Linked Orders Report.    -- IV pharmacy to manage frequency 06/02/23 1424          Latest lactate reviewed-No results for input(s): LACTATE in the last 72 hours. Organ dysfunction indicated by Acute kidney injury, Encephalopathy and Thrombocytopenia   Fluid challenge Ideal Body Weight- The patient's ideal body weight is Ideal body weight: 61.6 kg (135 lb 12.9 oz) which will be used to calculate fluid bolus of 30 ml/kg for treatment of septic shock.    Post- resuscitation assessment Yes Perfusion exam was performed within 6 hours of septic shock presentation after bolus shows Adequate tissue perfusion assessed by non-invasive monitoring   Will Not start Pressors- Levophed for MAP of 65. Source control achieved by: Pending    Pt previously hospitalized x2 over last mos also for sepsis 2/2 CAP w/ L pleural effusion, improved w/ IV Abx.  Unclear if source control was truly achieved previously in s/o no thoracentesis performed 2/2 risks outweighing benefits.  On this admission, pt w/ hypothermia & leukopenia c/w SIRS w/ likely LLL effusion (possibly infected) vs PNA (CAP vs aspiration in s/o AMS) w/ encephalopathy, c/w severe sepsis.  VSx improved s/p IVF & bear hugger.  UA w/o e/o UTI; would also consider CT CAP w/ contrast to eval for other intrathoracic/intra-abdo source, however would be cautious w/ contrast w/ pt's CKD.  No recent BCx data.  DDx infected pleural effusion vs empyema vs intra-abdo vs cutaneous vs bacteremia.  Also malignancy (leukemia vs lymphoma) given pancytopenia vs autoimmune vs underlying autonomic dysfxn.    - trend LA q.3 until resolution  - f/u BCx  - Abx, tailor p.r.n. Cx; Vanc/pip-tazo/azithro  - cardiac tele w/ pulse ox, supp O2 p.r.n. goal SpO2 > 92%, fall precautions, delirium precautions, aspiration precautions, neuro checks q.4  - diet  NPO  - hold antiHTVs  - avoid sedating agents     Pancytopenia  - monitor  - CBC q.d.     (HFpEF) heart failure with preserved ejection fraction  Bilateral leg edema    - hold Furosemide in s/o sepsis; restart as clinically indicated     PAOD (peripheral arterial occlusive disease)  Hx of transient ischemic attack (TIA)    - monitor  - home ASA     Type 2 diabetes mellitus with diabetic polyneuropathy, with long-term current use of insulin  Patient's FSGs are controlled on current medication regimen.  Last A1c reviewed-   Lab Results   Component Value Date    HGBA1C 8.1 (H) 03/23/2023     Most recent fingerstick glucose reviewed-   Recent Labs   Lab 06/02/23  1104   POCTGLUCOSE 156*     Current correctional scale  Low  Maintain anti-hyperglycemic dose as follows-   Antihyperglycemics (From admission, onward)      Start     Stop Route Frequency Ordered    06/02/23 2100  insulin detemir U-100 (Levemir) pen 10 Units         -- SubQ Nightly 06/02/23 1424    06/02/23 1521  insulin aspart U-100 pen 0-5 Units         -- SubQ Before meals & nightly PRN 06/02/23 1424          Hold Oral hypoglycemics while patient is in the hospital.    - LDSSI, detemir; titrate p.r.n. goal Glc 140-180  - POCT Glc a.c. & q.h.s.  - diet diabetic, when able to take PO  - hold PO antiGlcs     Stage 4 chronic kidney disease  - UA, UPCR, uCr, Cindy, uUrea  - US RP  - renally dose Rx  - strict I/Os, bladder scan p.r.n. UR, straight/Sanchez cath p.r.n. bladder scan > 300 cc  - hold NSAIDs  - avoid nephrotoxic agents     Debility  - Consider Pall Care consult in s/o worsening fxnal b/l consulted     Essential hypertension  - hold antiHTVs in s/o sepsis; restart as clinically indicated     Hyperlipidemia LDL goal <100  - home statin       VTE Risk Mitigation (From admission, onward)           Ordered     IP VTE HIGH RISK PATIENT  Once         06/02/23 1424     Place sequential compression device  Until discontinued         06/02/23 1424                       Mike Avendano MD  Department of Hospital Medicine   [facility]

## 2023-06-02 NOTE — ASSESSMENT & PLAN NOTE
- UA, UPCR, uCr, Cindy, uUrea  - US RP  - renally dose Rx  - strict I/Os, bladder scan p.r.n. UR, straight/Sanchez cath p.r.n. bladder scan > 300 cc  - hold NSAIDs  - avoid nephrotoxic agents

## 2023-06-02 NOTE — ED TRIAGE NOTES
"Patient arrives to the ED today via EMS with reports of altered mental status. Per EMS patient from [facility] where she had an episode of "unresponsiveness" after eating breakfast. Upon arrival of EMS patient unresponsive to pain patient 1mg Narcan with some response patient awake on arrival but not speaking per staff at nursing home patient verbal at baseline.   "

## 2023-06-02 NOTE — ACP (ADVANCE CARE PLANNING)
Advance Care Planning     Date: 06/02/2023    Today a meeting took place: bedside    Patient Participation: Patient is unable to participate     Attendees (Name and  Relationship to patient):  Daughter at bedside    Staff attendees (Name and  Role): Dr. Mike Avendano, Dr. Krystyna Neil, Dr. Melissa Alcantar    ACP Conversation (General): Other (specify below) Discussed patient's prior discussions regarding goals of care and declining major procedures as well as DNR code status.    Code Status: DNR; status confirmed/order placed in chart     ACP Documents: [place]: Reviewed/renewed existing form    Goals of care: The family endorses that what is most important right now is to focus on quality of life, even if it means sacrificing a little time    Accordingly, we have decided that the best plan to meet the patient's goals includes continuing with treatment      Recommendations/  Follow-up tasks: The patient and health care agent were provided the following recommendations to discuss patient's goals of care with the patient if she is able to participate. Also encouraged further discussions with other family members.       Length of ACP   conversation in minutes: 16 minutes

## 2023-06-02 NOTE — CONSULTS
[facility] - Emergency Dept  Vascular Neurology  [facility]  Consult Note    Inpatient consult to Vascular (Stroke) Neurology  Consult performed by: Светлана Menjivar PA-C  Consult ordered by: Wendie Kelley MD      Assessment/Plan:     Patient is a 78 y.o. year old female with:    Altered mental status  Sherin Ortiz is a 78 y.o. female with PMHx of HTN, HLD, DM, debility, HFpEF, TIA, CKD4, and PVD who presented to ED via EMS from [facility] for AMS with episode of unresponsiveness after eating breakfast. She had some response to narcan administered by EMS. According to her family, she was slow to respond yesterday and hallucinating  family members. Family has been concerned about possible stroke causing her dysphagia. Stroke team was consulted.    Of note, she was recently admitted and discharged from [facility] where she was hospitalized for dysphagia and progressive decline. She was hypothermic, pancytopenic, and had a L loculated pleural effusion. Patient was evaluated by SLP and had MBSS, SLP suspecting presphagia and presbylaryngis. Per goals of care discussion with patient and family during this admission, patient declined further invasive treatments, patient DNR and LaPOST in chart, plans were for outpatient palliative care appointment.    Exam limited by patient's mental status. However, no focal neurologic deficit noted on exam. CT with motion artifact. MRI negative for acute stroke. Low suspicion for cerebrovascular origin for patient's symptoms. Stroke team will sign off. Please contact [phone] with any questions or concerns.        STROKE DOCUMENTATION          NIH Scale:  1a. Level of Consciousness: 1-->Not alert, but arousable by minor stimulation to obey, answer, or respond  1b. LOC Questions: 2-->Answers neither question correctly  1c. LOC Commands: 0-->Performs both tasks correctly  2. Best Gaze: 0-->Normal  3. Visual: 0-->No visual loss  4. Facial Palsy:  0-->Normal symmetrical movements  5a. Motor Arm, Left: 3-->No effort against gravity, limb falls  5b. Motor Arm, Right: 3-->No effort against gravity, limb falls  6a. Motor Leg, Left: 3-->No effort against gravity, leg falls to bed immediately  6b. Motor Leg, Right: 3-->No effort against gravity, leg falls to bed immediately  7. Limb Ataxia: 0-->Absent  8. Sensory: 0-->Normal, no sensory loss  9. Best Language: 2-->Severe aphasia, all communication is through fragmentary expression, great need for inference, questioning, and guessing by the listener. Range of information that can be exchanged is limited, listener carries burden of. . . (see row details)  10. Dysarthria: 2-->Severe dysarthria, patients speech is so slurred as to be unintelligible in the absence of or out of proportion to any dysphasia, or is mute/anarthric  11. Extinction and Inattention (formerly Neglect): 0-->No abnormality  Total (NIH Stroke Scale): 19    Modified Erath Score: 4  Dunnellon Coma Scale:    ABCD2 Score:    [id number] Score:   HAS -BLED Score:   ICH Score:   Hunt & Cosby Classification:       Thrombolysis Candidate? No, Strong suspicion for stroke mimic or alternative diagnosis     Delays to Thrombolysis?  Not Applicable    Interventional Revascularization Candidate?   Is the patient eligible for mechanical endovascular reperfusion (KENNETH)?  No; at this time symptoms not suggestive of large vessel occlusion    Delays to Thrombectomy? Not Applicable    Hemorrhagic change of an Ischemic Stroke: Does this patient have an ischemic stroke with hemorrhagic changes? No     Subjective:     History of Present Illness:  Sherin Ortiz is a 78 y.o. female with PMHx of HTN, HLD, DM, debility, HFpEF, TIA, CKD4, and PVD who presented to ED via EMS from [facility] for AMS. Patient had episode of unresponsiveness after eating breakfast. She had some response to narcan administered by EMS. According to her family, she was slow to respond  yesterday and hallucinating  family members. Family has been concerned about possible stroke causing her dysphagia. Stroke team was consulted.    Of note, she was recently admitted and discharged from [facility] where she was hospitalized for dysphagia and progressive decline. She was hypothermic, pancytopenic, and had a L loculated pleural effusion. Patient was evaluated by SLP and had MBSS, SLP suspecting presphagia and presbylaryngis. Per goals of care discussion with patient and family during this admission, patient declined further invasive treatments, patient DNR and LaPOST in chart, plans were for outpatient palliative care appointment.                           Past Medical History:   Diagnosis Date    Allergy     Asteroid hyalosis - Left Eye 2013    Benign essential hypertension 2012    Cataract     s/p phacoemulsification    Chronic kidney disease (CKD), stage III (moderate) 2013    Diabetic peripheral neuropathy associated with type 2 diabetes mellitus 2014    causing right hemiparesis    Gait disorder     Hyperlipidemia     Iritis - Both Eyes 6/10/2013    Kidney stone     Lymphedema     Morbid obesity with BMI of 40.0-44.9, adult 2015    Nephrolithiasis 2016    NS (nuclear sclerosis) 2013    Nuclear sclerosis - Both Eyes 2013    Preseptal cellulitis - Right Eye 2013    Proliferative diabetic retinopathy - Both Eyes 2013    Proliferative diabetic retinopathy, both eyes 2013    PSC (posterior subcapsular cataract) - Both Eyes 2013    S/P hernia repair 2012    TIA (transient ischemic attack) 2014    Tinea pedis 2012    Tinea pedis is present on both feet.     Type 2 diabetes mellitus with diabetic polyneuropathy, with long-term current use of insulin 2015    Type 2 diabetes mellitus with renal manifestations, controlled 2013    Type 2 diabetes, controlled, with moderate nonproliferative diabetic retinopathy  without macular edema 9/17/2015    Ulcer of left lower extremity, limited to breakdown of skin 7/8/2015    Unspecified cerebral artery occlusion with cerebral infarction 11/16/2014    Unspecified venous (peripheral) insufficiency     Ureteral stone with hydronephrosis 1/27/2016    UTI (lower urinary tract infection)     Vaginal infection     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     Past Surgical History:   Procedure Laterality Date    ABLATION Bilateral 6/23/2022    Procedure: Ablation;  Surgeon: Vahid Farfan MD;  Location: [facility] CATH LAB/EP;  Service: Cardiology;  Laterality: Bilateral;    ABLATION Right 11/17/2022    Procedure: Ablation;  Surgeon: Vahid Farfan MD;  Location: [facility] CATH LAB/EP;  Service: Cardiology;  Laterality: Right;    APPENDECTOMY      CATARACT EXTRACTION W/  INTRAOCULAR LENS IMPLANT Left 5/21/2013    CATARACT EXTRACTION W/  INTRAOCULAR LENS IMPLANT Right 6/4/2013    CHOLECYSTECTOMY      COLONOSCOPY  12/22/2005    normal    COLONOSCOPY N/A 7/14/2022    Procedure: COLONOSCOPY;  Surgeon: Kannan Mace MD;  Location: [facility];  Service: Endoscopy;  Laterality: N/A;    ESOPHAGOGASTRODUODENOSCOPY  12/21/2015    hiatal hernia, Schatzki ring    ESOPHAGOGASTRODUODENOSCOPY N/A 7/13/2022    Procedure: EGD (ESOPHAGOGASTRODUODENOSCOPY);  Surgeon: Kannan Mace MD;  Location: [facility];  Service: Endoscopy;  Laterality: N/A;    EYE SURGERY Bilateral 2008    laser surgery both eyes    INTRALUMINAL GASTROINTESTINAL TRACT IMAGING VIA CAPSULE N/A 7/15/2022    Procedure: IMAGING PROCEDURE, GI TRACT, INTRALUMINAL, VIA CAPSULE;  Surgeon: Kannan Mace MD;  Location: [facility];  Service: Endoscopy;  Laterality: N/A;    NASAL SEPTUM SURGERY      SMALL BOWEL ENTEROSCOPY N/A 7/18/2022    Procedure: ENTEROSCOPY Upper SBE;  Surgeon: Salo Frank MD;  Location: [facility];  Service: Endoscopy;  Laterality: N/A;    SUBTOTAL COLECTOMY  12/13/2012    transverse colon, for incarcerated umbilical  jamie, Dr. Kat Bower     Family History   Problem Relation Age of Onset    Diabetes Sister     Cataracts Sister     Heart disease Brother     Cataracts Brother     Leukemia Mother     Cancer Neg Hx     Amblyopia Neg Hx     Blindness Neg Hx     Glaucoma Neg Hx     Hypertension Neg Hx     Macular degeneration Neg Hx     Retinal detachment Neg Hx     Strabismus Neg Hx     Stroke Neg Hx     Thyroid disease Neg Hx     Kidney disease Neg Hx      Social History     Tobacco Use    Smoking status: Former     Packs/day: 0.50     Years: 15.00     Pack years: 7.50     Types: Cigarettes     Quit date: 1982     Years since quittin.9    Smokeless tobacco: Former    Tobacco comments:     smoked one pack per week   Substance Use Topics    Alcohol use: No    Drug use: No     Review of patient's allergies indicates:   Allergen Reactions    Penicillins Hives     Other reaction(s): Hives  Patient has received cefdinir, ceftriaxone, cefazolin and cefepime in the past with no documented reactions    Sulfa (sulfonamide antibiotics) Other (See Comments)     Shakes, pt states her doctor told her the shakes were possibly caused by an allergy to sulfa       Medications: I have reviewed the current medication administration record.    (Not in a hospital admission)      Review of Systems   Constitutional:  Negative for fever.   HENT:  Positive for trouble swallowing.    Respiratory:  Negative for cough.    Gastrointestinal:  Negative for vomiting.   Skin:  Negative for rash.   Neurological:  Positive for speech difficulty and weakness. Negative for numbness.   Psychiatric/Behavioral:  Negative for agitation.    Objective:     Vital Signs (Most Recent):  Temp: (!) 92.7 °F (33.7 °C) (23 161)  Pulse: 64 (23 161)  Resp: 17 (23 161)  BP: (!) 124/58 (23 1500)  SpO2: 96 % (23 161)    Vital Signs Range (Last 24H):  Temp:  [88.9 °F (31.6 °C)-92.7 °F (33.7 °C)]   Pulse:  [53-64]   Resp:  [13-20]   BP:  ()/(50-80)   SpO2:  [95 %-99 %]        Physical Exam  Vitals reviewed.   Constitutional:       General: She is not in acute distress.     Appearance: She is well-developed.   HENT:      Head: Normocephalic and atraumatic.   Cardiovascular:      Rate and Rhythm: Bradycardia present.   Pulmonary:      Effort: Pulmonary effort is normal. No respiratory distress.   Skin:     General: Skin is warm and dry.            Neurological Exam:   LOC: drowsy  Attention Span: poor  Language: Expressive aphasia, follows commands  Articulation: Mute/Anarthric  Orientation: Not oriented to person, Not oriented to place, Not oriented to time  Visual Fields: Full  EOM (CN III, IV, VI): Full/intact  Facial Movement (CN VII): Symmetric facial expression    Motor: Arm left  Paresis: 2/5  Leg left  Paresis: 2/5  Arm right  Paresis: 2/5  Leg right Paresis: 2/5  Sensation: withdraws to painful stimuli  Tone: Normal tone throughout      Laboratory:  CMP:   Recent Labs   Lab 06/02/23  1109   CALCIUM 9.1   ALBUMIN 2.4*   PROT 6.1      K 3.7   CO2 26      BUN 25*   CREATININE 2.1*   ALKPHOS 177*   ALT 33   AST 54*   BILITOT 0.3     CBC:   Recent Labs   Lab 06/02/23  1109   WBC 1.85*   RBC 4.15   HGB 10.2*   HCT 33.8*   PLT 90*   MCV 81*   MCH 24.6*   MCHC 30.2*     Lipid Panel: No results for input(s): CHOL, LDLCALC, HDL, TRIG in the last 168 hours.  Coagulation:   Recent Labs   Lab 05/28/23  0444   INR 1.1   APTT 28.5     Hgb A1C: No results for input(s): HGBA1C in the last 168 hours.  TSH:   Recent Labs   Lab 06/02/23  1109   TSH 5.769*       Diagnostic Results:      Brain imaging:  MRI Brain 6/2/23  Impression:     No acute intracranial abnormality.     Bilateral mild mastoid fluid, left greater than right.    CT Head 6/2/23  Impression:     Significant motion distorted examination.  No definite acute intracranial hemorrhage or large territory recent infarction allowing for artifacts.  Please note subtle lesion could be  obscured by the artifact.  Clinical correlation and further evaluation with repeat attempts for imaging when patient better able to tolerate scanning advised       Vessel Imaging:  N/A    Cardiac Evaluation:   TTE 04/28/23  The estimated ejection fraction is 60%.  The left ventricle is normal in size with concentric remodeling and normal systolic function.  Normal left ventricular diastolic function.  Normal right ventricular size with normal right ventricular systolic function.  Severe left atrial enlargement.  The estimated PA systolic pressure is 39 mmHg.  Intermediate central venous pressure (8 mmHg).  Mild right atrial enlargement.        Светлана Menjivar PA-C  [facility]  Department of Vascular Neurology   [facility] - Emergency Dept

## 2023-06-02 NOTE — SUBJECTIVE & OBJECTIVE
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    Benign essential hypertension 11/14/2012    Cataract     s/p phacoemulsification    Chronic kidney disease (CKD), stage III (moderate) 9/12/2013    Diabetic peripheral neuropathy associated with type 2 diabetes mellitus 11/14/2014    causing right hemiparesis    Gait disorder     Hyperlipidemia     Iritis - Both Eyes 6/10/2013    Kidney stone     Lymphedema     Morbid obesity with BMI of 40.0-44.9, adult 2/18/2015    Nephrolithiasis 4/20/2016    NS (nuclear sclerosis) 4/1/2013    Nuclear sclerosis - Both Eyes 4/29/2013    Preseptal cellulitis - Right Eye 4/29/2013    Proliferative diabetic retinopathy - Both Eyes 4/29/2013    Proliferative diabetic retinopathy, both eyes 4/1/2013    PSC (posterior subcapsular cataract) - Both Eyes 4/29/2013    S/P hernia repair 12/19/2012    TIA (transient ischemic attack) 11/18/2014    Tinea pedis 7/24/2012    Tinea pedis is present on both feet.     Type 2 diabetes mellitus with diabetic polyneuropathy, with long-term current use of insulin 9/18/2015    Type 2 diabetes mellitus with renal manifestations, controlled 12/12/2013    Type 2 diabetes, controlled, with moderate nonproliferative diabetic retinopathy without macular edema 9/17/2015    Ulcer of left lower extremity, limited to breakdown of skin 7/8/2015    Unspecified cerebral artery occlusion with cerebral infarction 11/16/2014    Unspecified venous (peripheral) insufficiency     Ureteral stone with hydronephrosis 1/27/2016    UTI (lower urinary tract infection)     Vaginal infection     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     Past Surgical History:   Procedure Laterality Date    ABLATION Bilateral 6/23/2022    Procedure: Ablation;  Surgeon: Vahid Farfan MD;  Location: [facility] CATH LAB/EP;  Service: Cardiology;  Laterality: Bilateral;    ABLATION Right 11/17/2022    Procedure: Ablation;  Surgeon: Vahid Farfan MD;   Location: [facility] CATH LAB/EP;  Service: Cardiology;  Laterality: Right;    APPENDECTOMY      CATARACT EXTRACTION W/  INTRAOCULAR LENS IMPLANT Left 2013    CATARACT EXTRACTION W/  INTRAOCULAR LENS IMPLANT Right 2013    CHOLECYSTECTOMY      COLONOSCOPY  2005    normal    COLONOSCOPY N/A 2022    Procedure: COLONOSCOPY;  Surgeon: Kannan Mace MD;  Location: [facility] ENDO;  Service: Endoscopy;  Laterality: N/A;    ESOPHAGOGASTRODUODENOSCOPY  2015    hiatal hernia, Schatzki ring    ESOPHAGOGASTRODUODENOSCOPY N/A 2022    Procedure: EGD (ESOPHAGOGASTRODUODENOSCOPY);  Surgeon: Kannan Mace MD;  Location: [facility] ENDO;  Service: Endoscopy;  Laterality: N/A;    EYE SURGERY Bilateral     laser surgery both eyes    INTRALUMINAL GASTROINTESTINAL TRACT IMAGING VIA CAPSULE N/A 7/15/2022    Procedure: IMAGING PROCEDURE, GI TRACT, INTRALUMINAL, VIA CAPSULE;  Surgeon: Kannan Mace MD;  Location: [facility] ENDO;  Service: Endoscopy;  Laterality: N/A;    NASAL SEPTUM SURGERY      SMALL BOWEL ENTEROSCOPY N/A 2022    Procedure: ENTEROSCOPY Upper SBE;  Surgeon: Salo Frank MD;  Location: [facility] ENDO;  Service: Endoscopy;  Laterality: N/A;    SUBTOTAL COLECTOMY  2012    transverse colon, for incarcerated umbilical hernia, Dr. Kat Bower     Family History   Problem Relation Age of Onset    Diabetes Sister     Cataracts Sister     Heart disease Brother     Cataracts Brother     Leukemia Mother     Cancer Neg Hx     Amblyopia Neg Hx     Blindness Neg Hx     Glaucoma Neg Hx     Hypertension Neg Hx     Macular degeneration Neg Hx     Retinal detachment Neg Hx     Strabismus Neg Hx     Stroke Neg Hx     Thyroid disease Neg Hx     Kidney disease Neg Hx      Social History     Tobacco Use    Smoking status: Former     Packs/day: 0.50     Years: 15.00     Pack years: 7.50     Types: Cigarettes     Quit date: 1982     Years since quittin.9    Smokeless tobacco: Former    Tobacco  comments:     smoked one pack per week   Substance Use Topics    Alcohol use: No    Drug use: No     Review of patient's allergies indicates:   Allergen Reactions    Penicillins Hives     Other reaction(s): Hives  Patient has received cefdinir, ceftriaxone, cefazolin and cefepime in the past with no documented reactions    Sulfa (sulfonamide antibiotics) Other (See Comments)     Shakes, pt states her doctor told her the shakes were possibly caused by an allergy to sulfa       Medications: I have reviewed the current medication administration record.    (Not in a hospital admission)      Review of Systems   Constitutional:  Negative for fever.   HENT:  Positive for trouble swallowing.    Respiratory:  Negative for cough.    Gastrointestinal:  Negative for vomiting.   Skin:  Negative for rash.   Neurological:  Positive for speech difficulty and weakness. Negative for numbness.   Psychiatric/Behavioral:  Negative for agitation.    Objective:     Vital Signs (Most Recent):  Temp: (!) 92.7 °F (33.7 °C) (06/02/23 1611)  Pulse: 64 (06/02/23 1611)  Resp: 17 (06/02/23 1611)  BP: (!) 124/58 (06/02/23 1500)  SpO2: 96 % (06/02/23 1611)    Vital Signs Range (Last 24H):  Temp:  [88.9 °F (31.6 °C)-92.7 °F (33.7 °C)]   Pulse:  [53-64]   Resp:  [13-20]   BP: ()/(50-80)   SpO2:  [95 %-99 %]        Physical Exam  Vitals reviewed.   Constitutional:       General: She is not in acute distress.     Appearance: She is well-developed.   HENT:      Head: Normocephalic and atraumatic.   Cardiovascular:      Rate and Rhythm: Bradycardia present.   Pulmonary:      Effort: Pulmonary effort is normal. No respiratory distress.   Skin:     General: Skin is warm and dry.            Neurological Exam:   LOC: drowsy  Attention Span: poor  Language: Expressive aphasia, follows commands  Articulation: Mute/Anarthric  Orientation: Not oriented to person, Not oriented to place, Not oriented to time  Visual Fields: Full  EOM (CN III, IV, VI):  Full/intact  Facial Movement (CN VII): Symmetric facial expression    Motor: Arm left  Paresis: 2/5  Leg left  Paresis: 2/5  Arm right  Paresis: 2/5  Leg right Paresis: 2/5  Sensation: withdraws to painful stimuli  Tone: Normal tone throughout      Laboratory:  CMP:   Recent Labs   Lab 06/02/23  1109   CALCIUM 9.1   ALBUMIN 2.4*   PROT 6.1      K 3.7   CO2 26      BUN 25*   CREATININE 2.1*   ALKPHOS 177*   ALT 33   AST 54*   BILITOT 0.3     CBC:   Recent Labs   Lab 06/02/23  1109   WBC 1.85*   RBC 4.15   HGB 10.2*   HCT 33.8*   PLT 90*   MCV 81*   MCH 24.6*   MCHC 30.2*     Lipid Panel: No results for input(s): CHOL, LDLCALC, HDL, TRIG in the last 168 hours.  Coagulation:   Recent Labs   Lab 05/28/23  0444   INR 1.1   APTT 28.5     Hgb A1C: No results for input(s): HGBA1C in the last 168 hours.  TSH:   Recent Labs   Lab 06/02/23  1109   TSH 5.769*       Diagnostic Results:      Brain imaging:  MRI pending    CT Head 6/2/23  Impression:     Significant motion distorted examination.  No definite acute intracranial hemorrhage or large territory recent infarction allowing for artifacts.  Please note subtle lesion could be obscured by the artifact.  Clinical correlation and further evaluation with repeat attempts for imaging when patient better able to tolerate scanning advised       Vessel Imaging:  N/A    Cardiac Evaluation:   TTE 04/28/23  The estimated ejection fraction is 60%.  The left ventricle is normal in size with concentric remodeling and normal systolic function.  Normal left ventricular diastolic function.  Normal right ventricular size with normal right ventricular systolic function.  Severe left atrial enlargement.  The estimated PA systolic pressure is 39 mmHg.  Intermediate central venous pressure (8 mmHg).  Mild right atrial enlargement.

## 2023-06-02 NOTE — HPI
78 y.o. female w/ h/o stroke, HFpEF (60%), PHTN, CKD 4, DM2, HTN, HLD; p/w AMS.  Hx taken mostly from son & daughter.  Pt Px from [facility] w/ GCS 7; last known normal was at 07:30 today by nursing staff.  Pt was found nonverbal at 09:50 w/ shallow respirations.  Stroke code was activated in ED w/ initial head scans u/r; noted that pt isn't a candidate for CTA; eval'd to unlikely be a stroke per Stroke team.  Pt also hypothermic & in AF on arrival.  On arrival to ED, pt remains nonverbal & unable to provide Hx, tho pt opens eyes to verbal stimuli & withdraws to painful stimuli.  Pt mentation improved s/p passive rewarming w/ bear hugger.    Notably, pt was recently hospitalized 05/26-05/31 for dysphagia, found to have large loculated L pleural effusion, & found to be hypothermic, pancytopenic, & hyperkalemic; improved s/p empiric Abx. Pt was also hospitalized 04/28-05/05 for SOB & weakness, found to have LLL CAP; improved s/p Abx.    ED course: T 90.1 F, HR 60, /80, 99% ORA.  Labs u/r except for WBC 1.85, Hb 10.2 (b/l 9-10), Plt 90, Cr 2.1 (b/l 2.0-2.1), , TSH 5.769.  UA u/r.  CXR w/ opacification of L lung base likely representing combo of pleural effusion w/ atelectasis/consolidation; similar patchy airspace & interstitial type opacities at R lung base.  CTH w/o definite acute ICH, tho limited 2/2 motion artifact.  Given IVF 2 L, started on vanc/pip-tazo.

## 2023-06-02 NOTE — ASSESSMENT & PLAN NOTE
Sherin Ortiz is a 78 y.o. female with PMHx of HTN, HLD, DM, debility, HFpEF, TIA, CKD4, and PVD who presented to ED via EMS from [facility] for AMS with episode of unresponsiveness after eating breakfast. She had some response to narcan administered by EMS. According to her family, she was slow to respond yesterday and hallucinating  family members. Family has been concerned about possible stroke causing her dysphagia. Stroke team was consulted.    Of note, she was recently admitted and discharged from [facility] where she was hospitalized for dysphagia and progressive decline. She was hypothermic, pancytopenic, and had a L loculated pleural effusion. Patient was evaluated by SLP and had MBSS, SLP suspecting presphagia and presbylaryngis. Per goals of care discussion with patient and family during this admission, patient declined further invasive treatments, patient DNR and LaPOST in chart, plans were for outpatient palliative care appointment.    Exam limited by patient's mental status. However, no focal neurologic deficit noted on exam to suspect stroke. CT with motion artifact. Recommend MRI brain without contrast. Stroke team will follow up on imaging.

## 2023-06-02 NOTE — ED PROVIDER NOTES
"Encounter Date: 6/2/2023       History     Chief Complaint   Patient presents with    Altered Mental Status     Arrived ems from [facility] with c/o "unresponsive per staff", on ems arrival pt not responsive to painful stimuli, received 1 mg narcan by EMS, eyes now open with pt looking around, per staff baseline is verbal, LSN 730am, found by staff at 0950am nonverbal with difficulty breathing     Sherin Ortiz is a 78 y.o. female with PMH of diabetes, CKD, history of stroke, presenting to [facility] ED for AMS.  Patient presenting by EMS from [facility] with a GCS is 7.  States that her last known normal was at 7:30 a.m. by nursing staff.  Patient found nonverbal at 9:50 a.m. with shallow respirations.  Patient presented to the ED with eyes responsive to verbal stimulus, withdrawing to pain, nonverbal unable to provide any history.        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    Benign essential hypertension 11/14/2012    Cataract     s/p phacoemulsification    Chronic kidney disease (CKD), stage III (moderate) 9/12/2013    Diabetic peripheral neuropathy associated with type 2 diabetes mellitus 11/14/2014    causing right hemiparesis    Gait disorder     Hyperlipidemia     Iritis - Both Eyes 6/10/2013    Kidney stone     Lymphedema     Morbid obesity with BMI of 40.0-44.9, adult 2/18/2015    Nephrolithiasis 4/20/2016    NS (nuclear sclerosis) 4/1/2013    Nuclear sclerosis - Both Eyes 4/29/2013    Preseptal cellulitis - Right Eye 4/29/2013    Proliferative diabetic retinopathy - " Both Eyes 4/29/2013    Proliferative diabetic retinopathy, both eyes 4/1/2013    PSC (posterior subcapsular cataract) - Both Eyes 4/29/2013    S/P hernia repair 12/19/2012    TIA (transient ischemic attack) 11/18/2014    Tinea pedis 7/24/2012    Tinea pedis is present on both feet.     Type 2 diabetes mellitus with diabetic polyneuropathy, with long-term current use of insulin 9/18/2015    Type 2 diabetes mellitus with renal manifestations, controlled 12/12/2013    Type 2 diabetes, controlled, with moderate nonproliferative diabetic retinopathy without macular edema 9/17/2015    Ulcer of left lower extremity, limited to breakdown of skin 7/8/2015    Unspecified cerebral artery occlusion with cerebral infarction 11/16/2014    Unspecified venous (peripheral) insufficiency     Ureteral stone with hydronephrosis 1/27/2016    UTI (lower urinary tract infection)     Vaginal infection     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     Past Surgical History:   Procedure Laterality Date    ABLATION Bilateral 6/23/2022    Procedure: Ablation;  Surgeon: Vahid Farfan MD;  Location: [facility] CATH LAB/EP;  Service: Cardiology;  Laterality: Bilateral;    ABLATION Right 11/17/2022    Procedure: Ablation;  Surgeon: Vahid Farfan MD;  Location: [facility] CATH LAB/EP;  Service: Cardiology;  Laterality: Right;    APPENDECTOMY      CATARACT EXTRACTION W/  INTRAOCULAR LENS IMPLANT Left 5/21/2013    CATARACT EXTRACTION W/  INTRAOCULAR LENS IMPLANT Right 6/4/2013    CHOLECYSTECTOMY      COLONOSCOPY  12/22/2005    normal    COLONOSCOPY N/A 7/14/2022    Procedure: COLONOSCOPY;  Surgeon: Kannan Mace MD;  Location: [facility];  Service: Endoscopy;  Laterality: N/A;    ESOPHAGOGASTRODUODENOSCOPY  12/21/2015    hiatal hernia, Schatzki ring    ESOPHAGOGASTRODUODENOSCOPY N/A 7/13/2022    Procedure: EGD (ESOPHAGOGASTRODUODENOSCOPY);  Surgeon: Kannan Mace MD;  Location: [facility];  Service: Endoscopy;  Laterality: N/A;    EYE SURGERY Bilateral  2008    laser surgery both eyes    INTRALUMINAL GASTROINTESTINAL TRACT IMAGING VIA CAPSULE N/A 7/15/2022    Procedure: IMAGING PROCEDURE, GI TRACT, INTRALUMINAL, VIA CAPSULE;  Surgeon: Kannan Mace MD;  Location: [facility] ENDO;  Service: Endoscopy;  Laterality: N/A;    NASAL SEPTUM SURGERY      SMALL BOWEL ENTEROSCOPY N/A 2022    Procedure: ENTEROSCOPY Upper SBE;  Surgeon: Salo Frank MD;  Location: [facility] ENDO;  Service: Endoscopy;  Laterality: N/A;    SUBTOTAL COLECTOMY  2012    transverse colon, for incarcerated umbilical hernia, Dr. Kat Bower     Family History   Problem Relation Age of Onset    Diabetes Sister     Cataracts Sister     Heart disease Brother     Cataracts Brother     Leukemia Mother     Cancer Neg Hx     Amblyopia Neg Hx     Blindness Neg Hx     Glaucoma Neg Hx     Hypertension Neg Hx     Macular degeneration Neg Hx     Retinal detachment Neg Hx     Strabismus Neg Hx     Stroke Neg Hx     Thyroid disease Neg Hx     Kidney disease Neg Hx      Social History     Tobacco Use    Smoking status: Former     Packs/day: 0.50     Years: 15.00     Pack years: 7.50     Types: Cigarettes     Quit date: 1982     Years since quittin.9    Smokeless tobacco: Former    Tobacco comments:     smoked one pack per week   Substance Use Topics    Alcohol use: No    Drug use: No     Review of Systems   Unable to perform ROS: Mental status change     Physical Exam     Initial Vitals   BP Pulse Resp Temp SpO2   23 1039 23 1039 23 1100 23 1145 23 1039   110/80 60 15 (S) (!) 88.9 °F (31.6 °C) 99 %      MAP       --                Physical Exam    Nursing note and vitals reviewed.  Constitutional: Vital signs are normal. She appears lethargic. She is cooperative. She appears toxic. She has a sickly appearance.   HENT:   Head: Normocephalic and atraumatic.   Mouth/Throat: Mucous membranes are dry.   Eyes: Conjunctivae are normal. Pupils are equal, round, and  reactive to light.   Neck: Trachea normal and phonation normal. Neck supple. No tracheal deviation present.   Cardiovascular:  Normal rate, regular rhythm, normal heart sounds, intact distal pulses and normal pulses.     Exam reveals no gallop, no S3, no S4 and no friction rub.       No murmur heard.  Pulmonary/Chest: Breath sounds normal. No respiratory distress. She has no wheezes. She has no rhonchi. She has no rales.   Abdominal: Abdomen is soft. She exhibits no distension. There is no abdominal tenderness. There is no rebound.   Musculoskeletal:      Cervical back: Neck supple.      Comments: Bilateral lower extremity edema.     Neurological: She appears lethargic. No cranial nerve deficit or sensory deficit. GCS eye subscore is 4. GCS verbal subscore is 1. GCS motor subscore is 4.   Patient will open eyes to verbal stimuli.    No verbal response.  Pupils equal round reactive to light.    Unable to assess extraocular movements, patient does not appear to have any gross visual field deficits.  Unable to participate in rest of neurologic exam based on patient's mental status.      Patient unable to move extremities on command.  Patient will withdraw to pain.   Skin: Skin is warm, dry and intact. Capillary refill takes less than 2 seconds.   Psychiatric: Her speech is normal.       ED Course   Critical Care    Date/Time: 6/2/2023 2:35 PM  Performed by: Shreyas Elizabeth DO  Authorized by: Shreyas Elizabeth DO   Direct patient critical care time: 15 minutes  Additional history critical care time: 15 minutes  Ordering / reviewing critical care time: 25 minutes  Documentation critical care time: 8 minutes  Consulting other physicians critical care time: 5 minutes  Consult with family critical care time: 5 minutes  Total critical care time (exclusive of procedural time) : 73 minutes  Critical care was necessary to treat or prevent imminent or life-threatening deterioration of the following conditions: CNS failure or  compromise and sepsis.      Labs Reviewed   CBC W/ AUTO DIFFERENTIAL - Abnormal; Notable for the following components:       Result Value    WBC 1.85 (*)     Hemoglobin 10.2 (*)     Hematocrit 33.8 (*)     MCV 81 (*)     MCH 24.6 (*)     MCHC 30.2 (*)     RDW 18.8 (*)     Platelets 90 (*)     Gran # (ANC) 1.1 (*)     Lymph # 0.5 (*)     Mono # 0.2 (*)     nRBC 2 (*)     All other components within normal limits    Narrative:     add on tsh per dr.emily centeno /order#[id number] @ 06/02/2023  11:26   WBC critical result(s) called and verbal readback obtained from Linda Marvin RN by JCEmily 06/02/2023 11:48   COMPREHENSIVE METABOLIC PANEL - Abnormal; Notable for the following components:    Glucose 161 (*)     BUN 25 (*)     Creatinine 2.1 (*)     Albumin 2.4 (*)     Alkaline Phosphatase 177 (*)     AST 54 (*)     eGFR 23.4 (*)     All other components within normal limits    Narrative:     add on tsh per dr.emily centeno /order#[id number] @ 06/02/2023  11:26    URINALYSIS, REFLEX TO URINE CULTURE - Abnormal; Notable for the following components:    Protein, UA 2+ (*)     Glucose, UA 3+ (*)     All other components within normal limits    Narrative:     Specimen Source->Urine   B-TYPE NATRIURETIC PEPTIDE - Abnormal; Notable for the following components:     (*)     All other components within normal limits    Narrative:     add on tsh per dr.emily centeno /order#[id number] @ 06/02/2023  11:26    TSH - Abnormal; Notable for the following components:    TSH 5.769 (*)     All other components within normal limits    Narrative:     add on tsh per dr.emily centeno /order#[id number] @ 06/02/2023  11:26    URINALYSIS MICROSCOPIC - Abnormal; Notable for the following components:    RBC, UA 5 (*)     All other components within normal limits    Narrative:     Specimen Source->Urine   PROTEIN / CREATININE RATIO, URINE - Abnormal; Notable for the following components:    Protein, Urine Random 160 (*)     Prot/Creat Ratio,  Urine 2.71 (*)     All other components within normal limits    Narrative:     Add on UNAR/[id number] & UPRCR/[id number] per Dr. Melissa Alcantar   16:07  06/02/2023.       Specimen Source->Urine   BASIC METABOLIC PANEL - Abnormal; Notable for the following components:    CO2 30 (*)     Glucose 179 (*)     BUN 26 (*)     Creatinine 2.0 (*)     Calcium 8.6 (*)     Anion Gap 5 (*)     eGFR 24.8 (*)     All other components within normal limits   ISTAT PROCEDURE - Abnormal; Notable for the following components:    POC Glucose 159 (*)     POC Creatinine 2.3 (*)     POC TCO2 (MEASURED) 32 (*)     POC Hematocrit 32 (*)     All other components within normal limits   ISTAT PROCEDURE - Abnormal; Notable for the following components:    POC PH 7.303 (*)     POC PCO2 61.0 (*)     POC PO2 63 (*)     POC HCO3 30.2 (*)     POC SATURATED O2 89 (*)     POC TCO2 32 (*)     All other components within normal limits   POCT GLUCOSE - Abnormal; Notable for the following components:    POCT Glucose 156 (*)     All other components within normal limits   POCT GLUCOSE - Abnormal; Notable for the following components:    POCT Glucose 188 (*)     All other components within normal limits   CULTURE, BLOOD    Narrative:     Aerobic and anaerobic   CULTURE, BLOOD    Narrative:     Aerobic and anaerobic   TROPONIN I    Narrative:     add on tsh per dr.emily centeno /order#[id number] @ 06/02/2023  11:26    TSH   T4, FREE    Narrative:     add on tsh per dr.emily centeno /order#[id number] @ 06/02/2023  11:26    SARS-COV2 (COVID) WITH FLU/RSV BY PCR   LACTIC ACID, PLASMA   PROCALCITONIN   CREATININE, URINE, RANDOM   PROTEIN / CREATININE RATIO, URINE   SODIUM, URINE, RANDOM   SODIUM, URINE, RANDOM    Narrative:     Add on UNAR/[id number] & UPRCR/[id number] per Dr. Melissa Alcantar   16:07  06/02/2023.       Specimen Source->Urine   MAGNESIUM   PHOSPHORUS   LACTIC ACID, PLASMA   POCT GLUCOSE, HAND-HELD DEVICE   POCT GLUCOSE, HAND-HELD DEVICE   ISTAT  LACTATE   ISTAT LACTATE     EKG Readings: (Independently Interpreted)   Rhythm: Sinus Bradycardia. Heart Rate: 59. Ectopy: No Ectopy. Conduction: RBBB. ST Segments: Normal ST Segments. Axis: Normal. Clinical Impression: Sinus Bradycardia with RBBB   ECG Results              EKG 12-lead (Final result)  Result time 06/02/23 11:47:09      Final result by Interface, Lab In [facility] (06/02/23 11:47:09)                   Narrative:    Test Reason : R00.1,    Vent. Rate : 059 BPM     Atrial Rate : 065 BPM     P-R Int : 000 ms          QRS Dur : 202 ms      QT Int : 562 ms       P-R-T Axes : 000 -51 093 degrees     QTc Int : 556 ms    Wide QRS rhythm  Right bundle branch block  Left anterior fascicular block   Bifascicular block   Abnormal ECG  When compared with ECG of 26-MAY-2023 21:03,  Wide QRS rhythm has replaced Atrial fibrillation  Vent. rate has decreased BY  80 BPM  Confirmed by KENRICK SANON MD ([id number]) on 6/2/2023 11:47:03 AM    Referred By: [email or web address]   SELF           Confirmed By:KENRICK SANON MD                                  Imaging Results              CT Chest Without Contrast (In process)  Result time 06/02/23 22:40:33                     MRI Brain Without Contrast (Final result)  Result time 06/02/23 20:00:26      Final result by Josue Nj MD (06/02/23 20:00:26)                   Impression:      No acute intracranial abnormality.    Bilateral mild mastoid fluid, left greater than right.    Electronically signed by resident: Claudia Law  Date:    06/02/2023  Time:    19:26    Electronically signed by: Josue Nj MD  Date:    06/02/2023  Time:    20:00               Narrative:    EXAMINATION:  MRI BRAIN WITHOUT CONTRAST    CLINICAL HISTORY:  Mental status change, unknown cause;.    TECHNIQUE:  Multiplanar multisequence MR imaging of the brain was performed without contrast.    COMPARISON:  CT head 06/02/2023    FINDINGS:  Intracranial compartment:    Ventricles and sulci are normal in size for age  without evidence of hydrocephalus. No extra-axial blood or fluid collections.    Brain parenchyma demonstrates scattered T2 FLAIR hyperintensities in the supratentorial white matter, most prominent around the ventricles, and also yany suggesting chronic microvascular ischemic changes.  No mass effect, acute hemorrhage, edema or acute infarct.    Normal vascular flow voids are preserved.    Skull/extracranial contents (limited evaluation): Bone marrow signal intensity is normal.  Mild bilateral mastoid fluid, left greater than right.  Mild ethmoid mucosal thickening.  Bilateral artificial lens.                                       X-Ray Chest AP Portable (Final result)  Result time 06/02/23 13:40:24      Final result by Josue Nj MD (06/02/23 13:40:24)                   Impression:      Grossly stable findings as above, when compared to 05/27/2023      Electronically signed by: Josue Nj MD  Date:    06/02/2023  Time:    13:40               Narrative:    EXAMINATION:  XR CHEST AP PORTABLE    CLINICAL HISTORY:  Sepsis;    TECHNIQUE:  Single frontal view of the chest was performed.    COMPARISON:  Chest radiograph 05/27/2023    FINDINGS:  Monitoring leads overlie the chest.  Patient is rotated.  Resolution is limited by body habitus with underpenetration.    Cardiomediastinal silhouette is midline and prominent similar to prior.  There is continued prominence of the central pulmonary vasculature.  Hilar contours are unchanged.  Similar opacification of the left lung base likely representing combination of pleural effusion with atelectasis/consolidation.  Similar patchy airspace and interstitial type opacities at the right lung base.  No sizable pleural effusion on the right.  No pneumothorax.  No acute osseous process seen.  PA and lateral views can be obtained.                                        CT Head Without Contrast (Final result)  Result time 06/02/23 11:22:29      Final result by Reinier Fajardo,  DO (06/02/23 11:22:29)                   Impression:      Significant motion distorted examination.  No definite acute intracranial hemorrhage or large territory recent infarction allowing for artifacts.  Please note subtle lesion could be obscured by the artifact.  Clinical correlation and further evaluation with repeat attempts for imaging when patient better able to tolerate scanning advised      Electronically signed by: Reinier Fajardo DO  Date:    06/02/2023  Time:    11:22               Narrative:    EXAMINATION:  CT HEAD WITHOUT CONTRAST    CLINICAL HISTORY:  Mental status change, unknown cause;    TECHNIQUE:  Multiple sequential 5 mm axial images of the head without contrast.  Coronal and sagittal reformatted imaging from the axial acquisition.    COMPARISON:  03/31/2023    FINDINGS:  Study is significantly distorted by motion artifacts.  Most pronounced along the skull base and posterior fossa.  Allowing for artifacts there is no definite acute intracranial hemorrhage.  Please note subtle hemorrhage or edema could be obscured by the degree of artifact along the inferior calvarium and posterior fossa.  The ventricles are stable without hydrocephalus.  There is no midline shift or mass effect.  Visualized paranasal sinuses and mastoid air cells are clear    This report was flagged in Epic as abnormal.                                       Medications   sodium chloride 0.9% flush 10 mL (has no administration in time range)   insulin aspart U-100 pen 0-5 Units (has no administration in time range)   melatonin tablet 6 mg (has no administration in time range)   naloxone 0.4 mg/mL injection 0.02 mg (has no administration in time range)   insulin detemir U-100 (Levemir) pen 10 Units (10 Units Subcutaneous Given 6/2/23 1665)   glucagon (human recombinant) injection 1 mg (has no administration in time range)   dextrose 10% bolus 125 mL 125 mL (has no administration in time range)   dextrose 10% bolus 250 mL 250 mL  "(has no administration in time range)   dextrose 40 % gel 15,000 mg (has no administration in time range)   dextrose 40 % gel 30,000 mg (has no administration in time range)   vancomycin - pharmacy to dose (has no administration in time range)   piperacillin-tazobactam (ZOSYN) 4.5 g in dextrose 5 % in water (D5W) 5 % 100 mL IVPB (MB+) (4.5 g Intravenous New Bag 6/2/23 2159)   aspirin chewable tablet 81 mg (81 mg Oral Not Given 6/2/23 1530)   atorvastatin tablet 40 mg (40 mg Oral Not Given 6/2/23 2100)   pantoprazole EC tablet 40 mg (40 mg Oral Not Given 6/2/23 1530)   azithromycin (ZITHROMAX) 500 mg in dextrose 5 % (D5W) 250 mL IVPB (Vial-Mate) (0 mg Intravenous Stopped 6/2/23 1730)   vancomycin 2 g in dextrose 5 % 500 mL IVPB (0 mg Intravenous Stopped 6/2/23 1509)   piperacillin-tazobactam (ZOSYN) 4.5 g in dextrose 5 % in water (D5W) 5 % 100 mL IVPB (MB+) (0 g Intravenous Stopped 6/2/23 1239)   lactated ringers bolus 1,000 mL (0 mLs Intravenous Stopped 6/2/23 1402)   lactated ringers bolus 1,000 mL (0 mLs Intravenous Stopped 6/2/23 1501)     Medical Decision Making:   History:   I obtained history from: EMS provider.  Old Medical Records: I decided to obtain old medical records.  Initial Assessment:   78-year-old female presenting from nursing home altered.  Patient hypothermic/bradycardic upon presentation.  Differential Diagnosis:   Sepsis, pneumonia, UTI, biliary pathology, liver pathology, intracranial pathology, thyrotoxicosis, electrolyte abnormality  Independently Interpreted Test(s):   I have ordered and independently interpreted X-rays - see prior notes.  I have ordered and independently interpreted EKG Reading(s) - see prior notes  Clinical Tests:   Lab Tests: Ordered and Reviewed  The following lab test(s) were unremarkable: CBC, BNP, Urinalysis, Troponin, Lactate and CMP  Radiological Study: Ordered and Reviewed  Medical Tests: Ordered and Reviewed  Sepsis Perfusion Assessment: "I attest a sepsis " "perfusion exam was performed within 6 hours of sepsis, severe sepsis, or septic shock presentation, following fluid resuscitation."  ED Management:  Patient presents altered with out significant history.  Patient initially hypothermic/bradycardic concerning for sepsis.  Sepsis workup ordered.  Patient given LR for fluid resuscitation, vanc/Zosyn for empiric antibiotics.  Patient does not appear to have any focal neurologic deficits but unable to participate in exam based on patient's mental status.  Code stroke was not called but patient taken urgently to CT imaging after presentation to the ED. stroke team was called to the ED for evaluation.  Initial CT shows no evidence of acute pathology.  Stroke team recommends follow-up with MRI.      Workup as detailed below in ED course.  Workup showing worsening of patient's baseline leukopenia.  Patient has mild elevation in BNP.  EKG/initial troponin reassuring, lower suspicion for ACS.  Suspected source is lungs based on chest x-ray concerning for pneumonia.      Hypothermia is improving with administration of Hernesto Hugger and warm blankets.  Patient remains hemodynamically stable and is appropriate for transfer to the floor.  Upon reassessment, patient moving her upper extremities.  Patient's mentation appears to be improving since she was not moving her extremities independently before resuscitation.    Discussed patient's case with Hospital Medicine who agreed to admit patient to their service for further management of sepsis with IV antibiotics.  Patient pending MRI..  Other:   I have discussed this case with another health care provider.          Attending Attestation:   Physician Attestation Statement for Resident:  As the supervising MD   Physician Attestation Statement: I have personally seen and examined this patient.   I agree with the above history.  -:   As the supervising MD I agree with the above PE.     As the supervising MD I agree with the above " treatment, course, plan, and disposition.                  I have reviewed and concur with the resident's history, physical, assessment, and plan.  I have personally interviewed and examined the patient at bedside.   I did supervise any and all procedures and was present for any critical portion, and was always immediately available for help and as a resource.     The above history physical, review of symptoms, HPI and physical exam reflect my independent interpretation and evaluation.    Briefly, 78-year-old female with a history of hypertension, diabetes, HL P, diastolic heart failure, CKD stage 4, obesity and debility recently admitted last week with concern for pneumonia and pleural effusion.  She was brought in via EMS somewhat unresponsive but improved after Narcan, bradycardic and improved with oxygen support.  She is able to protect her airway has a GCS of 9-10 on arrival.  She appears lethargic and occasionally disoriented with altered mental status.  Discussed case with stroke team who evaluated patient at bedside.  CT head negative for acute intracranial hemorrhage or large territory infarct, MRI obtained.  Patient noted to be hypothermic to 88 and bradycardic with concern for sepsis with leukopenia.  Treated with broad-spectrum antibiotics, chest x-ray with patchy airspace opacities in the right lung base.  Will obtain blood cultures, continue sepsis treatment and management.  Discussed case with hospital medicine will admit for ongoing management.  Has MRI brain pending at the time of sign-out.  Please see critical care note for critical care time.    Complexity: critical care    Final diagnoses:  [R00.1] Bradycardia  [R41.82] Altered mental status, unspecified altered mental status type (Primary)  [T68.XXXA] Hypothermia, initial encounter  [A41.9] Sepsis, due to unspecified organism, unspecified whether acute organ dysfunction present  [J18.9] Pneumonia due to infectious organism, unspecified  laterality, unspecified part of lung     Shreyas Elizabeth DO, FAAEM  Emergency Staff Physician   Dept of Emergency Medicine   [facility]  Spectralink: [id number]        Disclaimer: This note has been generated using voice-recognition software. There may be typographical errors that have been missed during proof-reading.            ED Course as of 06/02/23 2251 Fri Jun 02, 2023   1230 Temp(!)(S): 88.9 °F (31.6 °C) [ES]   1230 TSH(!): 5.769 [ES]   1230 Troponin I: 0.009 [ES]   1230 BNP(!): 263  Mildly elevated [ES]   1230 BUN(!): 25  Consistent with baseline [ES]   1230 Creatinine(!): 2.1  Consistent with baseline [ES]   1230 WBC(!!): 1.85  Patient has baseline leukopenia but this is a down trend. [ES]   1230 Hemoglobin(!): 10.2  Microcytic anemia consistent with baseline. [ES]   1231 CT Head Without Contrast(!)  No acute infarct or hemorrhage appreciated. [ES]   1231 POC Lactate: 2.06 [ES]   1247 Free T4: 0.87  Low suspicion for thyrotoxicosis [ES]      ED Course User Index  [ES] Wendie Kelley MD                 Clinical Impression:   Final diagnoses:  [R00.1] Bradycardia  [R41.82] Altered mental status, unspecified altered mental status type (Primary)  [T68.XXXA] Hypothermia, initial encounter  [A41.9] Sepsis, due to unspecified organism, unspecified whether acute organ dysfunction present  [J18.9] Pneumonia due to infectious organism, unspecified laterality, unspecified part of lung        ED Disposition Condition    Admit Stable                Wendie Kelley MD  Resident  06/02/23 1631       Shreyas Elizabeth DO  06/02/23 2258

## 2023-06-02 NOTE — HPI
Sherin Ortiz is a 78 y.o. female with PMHx of HTN, HLD, DM, debility, HFpEF, TIA, CKD4, and PVD who presented to ED via EMS from [facility] for AMS. Patient had episode of unresponsiveness after eating breakfast. She had some response to narcan administered by EMS. According to her family, she was slow to respond yesterday and hallucinating  family members. Family has been concerned about possible stroke causing her dysphagia. Stroke team was consulted.    Of note, she was recently admitted and discharged from [facility] where she was hospitalized for dysphagia and progressive decline. She was hypothermic, pancytopenic, and had a L loculated pleural effusion. Patient was evaluated by SLP and had MBSS, SLP suspecting presphagia and presbylaryngis. Per goals of care discussion with patient and family during this admission, patient declined further invasive treatments, patient DNR and LaPOST in chart, plans were for outpatient palliative care appointment.

## 2023-06-02 NOTE — ASSESSMENT & PLAN NOTE
Pleural effusion bilateral.    - Comfort measures  - Risk of thoracentesis outweighs long-term benefits

## 2023-06-02 NOTE — PROGRESS NOTES
Pharmacokinetic Initial Assessment: IV Vancomycin    Assessment/Plan:    Initiate intravenous vancomycin with loading dose of 2000 mg once, done in ED, with subsequent doses when random concentrations are less than 20 mcg/mL.  Desired empiric serum trough concentration is 15 to 20 mcg/mL.  Draw vancomycin random level with AM labs on 06/03/2023.  Pharmacy will continue to follow and monitor vancomycin.      Please contact pharmacy at extension [phone] with any questions regarding this assessment.     Thank you for the consult,   Yolie Reagan       Patient brief summary:  Sherin Ortiz is a 78 y.o. female initiated on antimicrobial therapy with IV Vancomycin for treatment of suspected sepsis.    Drug Allergies:   Review of patient's allergies indicates:   Allergen Reactions    Penicillins Hives     Other reaction(s): Hives  Patient has received cefdinir, ceftriaxone, cefazolin and cefepime in the past with no documented reactions    Sulfa (sulfonamide antibiotics) Other (See Comments)     Eyad, pt states her doctor told her the shakes were possibly caused by an allergy to sulfa       Actual Body Weight:   118.8 kg    Renal Function:   Estimated Creatinine Clearance: 28.5 mL/min (A) (based on SCr of 2.1 mg/dL (H)).     CBC (last 72 hours):  Recent Labs   Lab Result Units 05/31/23  0331 06/02/23  1109   WBC K/uL 2.21* 1.85*   Hemoglobin g/dL 9.3* 10.2*   Hematocrit % 30.6* 33.8*   Platelets K/uL 89* 90*   Gran % % 72.3 60.5   Lymph % % 17.6* 26.5   Mono % % 8.6 11.4   Eosinophil % % 0.5 1.1   Basophil % % 0.5 0.5   Differential Method  Automated Automated       Metabolic Panel (last 72 hours):  Recent Labs   Lab Result Units 05/30/23 2000 05/31/23  0741 06/02/23  1109 06/02/23  1132   Sodium mmol/L 140 141 143  --    Potassium mmol/L 3.5 3.7 3.7  --    Chloride mmol/L 104 105 107  --    CO2 mmol/L 26 26 26  --    Glucose mg/dL 207* 169* 161*  --    Glucose, UA   --   --   --  3+*   BUN mg/dL 29* 28* 25*  --     Creatinine mg/dL 2.4* 2.4* 2.1*  --    Albumin g/dL  --   --  2.4*  --    Total Bilirubin mg/dL  --   --  0.3  --    Alkaline Phosphatase U/L  --   --  177*  --    AST U/L  --   --  54*  --    ALT U/L  --   --  33  --        Drug levels (last 3 results):  No results for input(s): VANCOMYCINRA, VANCORANDOM, VANCOMYCINPE, VANCOPEAK, VANCOMYCINTR, VANCOTROUGH in the last 72 hours.    Microbiologic Results:  Microbiology Results (last 7 days)       Procedure Component Value Units Date/Time    Blood culture x two cultures. Draw prior to antibiotics. [[id number]] Collected: 06/02/23 1110    Order Status: Sent Specimen: Blood from Peripheral, Forearm, Right Updated: 06/02/23 1117    Blood culture x two cultures. Draw prior to antibiotics. [[id number]] Collected: 06/02/23 1110    Order Status: Sent Specimen: Blood from Peripheral, Hand, Right Updated: 06/02/23 1117

## 2023-06-02 NOTE — PHYSICIAN QUERY
"PT Name: Sherin Ortiz  MR #: [id number]    DOCUMENTATION CLARIFICATION     CDS/: Anjali Kim RN CCDS              Contact information:[email or web address]  This form is a permanent document in the medical record.     Query Date: June 2, 2023    By submitting this query, we are merely seeking further clarification of documentation. Please utilize your independent clinical judgment when addressing the question(s) below.    The Medical Record contains the following:   Indicators   Supporting Clinical Findings Location in Medical Record   x AMS, Confusion,  LOC, etc.  Comments: Significant encephalopathy; keeps eyes closed, speaks clearly but irrational statements; repeats "hurry up" again and again in the room; no clear focal deficits though full neurologic exam severely limited     Comments: Unable to fully assess given encephalopathy    Unable to perform ROS: Mental status change   Critical Care CN 5/27                        Hospital medicine PN 5/27   x Acute/Chronic Illness Aspiration pneumonia, pancytopenia, severe dysphagia, pleural effusion, wheelchair bound   Discharge summary    Radiology Findings     x Electrolyte Imbalance She presented with pancytopenia, hypergycemia, hypothermia, hyperkalemia,   LOREN on CKD 4   Discharge Summary   x Medication Rocephin IV  Vancomycin IV  Flagyl IV   MAR   x Treatment         She was started on empiric antibiotics with improvement in her pancytopenia and mental status.    Her mental status started to improve after empiric antibiotics. Discharge Summary    Other       The noted clinical guidelines are only system guidelines and do not replace the providers clinical judgment.    The National [place] of Neurologic Disorders and Stroke (NINDS) of the NIH describes encephalopathy as any diffuse disease of the brain that alters brain function or structure.    Please further specify the type of Encephalopathy.  Thank you.    [  x ] Metabolic Encephalopathy - Due " to electrolyte imbalance, metabolic derangements, or infectious processes, includes Septic Encephalopathy, Uremic Encephalopathy     [   ] Other Encephalopathy (please specify): ____________________     [   ] Other neurological condition- Includes Post-ictal altered mental status (please specify condition): __________     [   ]  Clinically Undetermined           Please document in your progress notes daily for the duration of treatment until resolved, and include in your discharge summary.    References:  ROJAS Buck RN, CCDS. (2018, June 9). Notes from the Instructor: Encephalopathy tips. Retrieved October 22, 2020, from [email or web address].org/articles/[email or web address]-instructor-encephalopathy-tips    ICD-9-CM Coding Clinic First Quarter 2013, Effective with discharges: October 21, 2013 [facility] § Seizure with encephalopathy due to postictal state (2013).    ICD-10-CM/[employer] Integrated Codebook (Version V.20.8.10.0) [Computer software]. (2020). Retrieved October 21, 2020.    National [place] of Neurological Disorders and Stroke. (2019, March 27). Retrieved October 22, 2020, from https://www.ninds.nih.gov/Disorders/All-Disorders/Zwbcepkyuyrekf-Plhvcscjnmp-Kopq    Form No. 27147

## 2023-06-03 PROBLEM — Z51.5 COMFORT MEASURES ONLY STATUS: Status: ACTIVE | Noted: 2023-06-03

## 2023-06-03 LAB
ALBUMIN SERPL BCP-MCNC: 2.1 G/DL (ref 3.5–5.2)
ALP SERPL-CCNC: 160 U/L (ref 55–135)
ALT SERPL W/O P-5'-P-CCNC: 26 U/L (ref 10–44)
ANION GAP SERPL CALC-SCNC: 10 MMOL/L (ref 8–16)
AST SERPL-CCNC: 51 U/L (ref 10–40)
BASOPHILS # BLD AUTO: 0 K/UL (ref 0–0.2)
BASOPHILS NFR BLD: 0 % (ref 0–1.9)
BILIRUB SERPL-MCNC: 0.3 MG/DL (ref 0.1–1)
BUN SERPL-MCNC: 25 MG/DL (ref 8–23)
CALCIUM SERPL-MCNC: 8.8 MG/DL (ref 8.7–10.5)
CHLORIDE SERPL-SCNC: 105 MMOL/L (ref 95–110)
CO2 SERPL-SCNC: 25 MMOL/L (ref 23–29)
CREAT SERPL-MCNC: 2.2 MG/DL (ref 0.5–1.4)
DIFFERENTIAL METHOD: ABNORMAL
EOSINOPHIL # BLD AUTO: 0 K/UL (ref 0–0.5)
EOSINOPHIL NFR BLD: 0.7 % (ref 0–8)
ERYTHROCYTE [DISTWIDTH] IN BLOOD BY AUTOMATED COUNT: 19.1 % (ref 11.5–14.5)
EST. GFR  (NO RACE VARIABLE): 22.1 ML/MIN/1.73 M^2
GLUCOSE SERPL-MCNC: 109 MG/DL (ref 70–110)
HCT VFR BLD AUTO: 31.5 % (ref 37–48.5)
HGB BLD-MCNC: 9.1 G/DL (ref 12–16)
IMM GRANULOCYTES # BLD AUTO: 0.01 K/UL (ref 0–0.04)
IMM GRANULOCYTES NFR BLD AUTO: 0.4 % (ref 0–0.5)
LYMPHOCYTES # BLD AUTO: 0.5 K/UL (ref 1–4.8)
LYMPHOCYTES NFR BLD: 17.4 % (ref 18–48)
MAGNESIUM SERPL-MCNC: 1.8 MG/DL (ref 1.6–2.6)
MCH RBC QN AUTO: 23.9 PG (ref 27–31)
MCHC RBC AUTO-ENTMCNC: 28.9 G/DL (ref 32–36)
MCV RBC AUTO: 83 FL (ref 82–98)
MONOCYTES # BLD AUTO: 0.3 K/UL (ref 0.3–1)
MONOCYTES NFR BLD: 11.9 % (ref 4–15)
NEUTROPHILS # BLD AUTO: 1.9 K/UL (ref 1.8–7.7)
NEUTROPHILS NFR BLD: 69.6 % (ref 38–73)
NRBC BLD-RTO: 2 /100 WBC
PHOSPHATE SERPL-MCNC: 3.3 MG/DL (ref 2.7–4.5)
PLATELET # BLD AUTO: 107 K/UL (ref 150–450)
PMV BLD AUTO: 10.4 FL (ref 9.2–12.9)
POCT GLUCOSE: 115 MG/DL (ref 70–110)
POCT GLUCOSE: 130 MG/DL (ref 70–110)
POTASSIUM SERPL-SCNC: 3.9 MMOL/L (ref 3.5–5.1)
PROT SERPL-MCNC: 5.7 G/DL (ref 6–8.4)
RBC # BLD AUTO: 3.8 M/UL (ref 4–5.4)
SODIUM SERPL-SCNC: 140 MMOL/L (ref 136–145)
VANCOMYCIN SERPL-MCNC: 33.6 UG/ML
WBC # BLD AUTO: 2.7 K/UL (ref 3.9–12.7)

## 2023-06-03 PROCEDURE — 20600001 HC STEP DOWN PRIVATE ROOM

## 2023-06-03 PROCEDURE — 25000003 PHARM REV CODE 250: Performed by: INTERNAL MEDICINE

## 2023-06-03 PROCEDURE — 84100 ASSAY OF PHOSPHORUS: CPT | Performed by: INTERNAL MEDICINE

## 2023-06-03 PROCEDURE — 99233 SBSQ HOSP IP/OBS HIGH 50: CPT | Mod: ,,, | Performed by: INTERNAL MEDICINE

## 2023-06-03 PROCEDURE — 99222 PR INITIAL HOSPITAL CARE,LEVL II: ICD-10-PCS | Mod: GC,,, | Performed by: EMERGENCY MEDICINE

## 2023-06-03 PROCEDURE — 85025 COMPLETE CBC W/AUTO DIFF WBC: CPT | Performed by: INTERNAL MEDICINE

## 2023-06-03 PROCEDURE — 83735 ASSAY OF MAGNESIUM: CPT | Performed by: INTERNAL MEDICINE

## 2023-06-03 PROCEDURE — 94761 N-INVAS EAR/PLS OXIMETRY MLT: CPT

## 2023-06-03 PROCEDURE — 36415 COLL VENOUS BLD VENIPUNCTURE: CPT | Performed by: INTERNAL MEDICINE

## 2023-06-03 PROCEDURE — 80053 COMPREHEN METABOLIC PANEL: CPT | Performed by: INTERNAL MEDICINE

## 2023-06-03 PROCEDURE — 99233 PR SUBSEQUENT HOSPITAL CARE,LEVL III: ICD-10-PCS | Mod: ,,, | Performed by: INTERNAL MEDICINE

## 2023-06-03 PROCEDURE — 99900035 HC TECH TIME PER 15 MIN (STAT)

## 2023-06-03 PROCEDURE — 80202 ASSAY OF VANCOMYCIN: CPT | Performed by: INTERNAL MEDICINE

## 2023-06-03 PROCEDURE — 63600175 PHARM REV CODE 636 W HCPCS: Performed by: INTERNAL MEDICINE

## 2023-06-03 PROCEDURE — 99222 1ST HOSP IP/OBS MODERATE 55: CPT | Mod: GC,,, | Performed by: EMERGENCY MEDICINE

## 2023-06-03 RX ORDER — LORAZEPAM 2 MG/ML
0.5 INJECTION INTRAMUSCULAR EVERY 30 MIN PRN
Status: DISCONTINUED | OUTPATIENT
Start: 2023-06-03 | End: 2023-06-06 | Stop reason: HOSPADM

## 2023-06-03 RX ORDER — MAGNESIUM SULFATE 1 G/100ML
1 INJECTION INTRAVENOUS ONCE
Status: COMPLETED | OUTPATIENT
Start: 2023-06-03 | End: 2023-06-03

## 2023-06-03 RX ORDER — GLYCOPYRROLATE 0.2 MG/ML
0.1 INJECTION INTRAMUSCULAR; INTRAVENOUS 3 TIMES DAILY PRN
Status: DISCONTINUED | OUTPATIENT
Start: 2023-06-03 | End: 2023-06-06 | Stop reason: HOSPADM

## 2023-06-03 RX ORDER — LORAZEPAM 2 MG/ML
1 INJECTION INTRAMUSCULAR EVERY 4 HOURS PRN
Status: DISCONTINUED | OUTPATIENT
Start: 2023-06-03 | End: 2023-06-06 | Stop reason: HOSPADM

## 2023-06-03 RX ORDER — ONDANSETRON 2 MG/ML
8 INJECTION INTRAMUSCULAR; INTRAVENOUS EVERY 8 HOURS PRN
Status: DISCONTINUED | OUTPATIENT
Start: 2023-06-03 | End: 2023-06-06 | Stop reason: HOSPADM

## 2023-06-03 RX ORDER — GLYCOPYRROLATE 0.2 MG/ML
0.1 INJECTION INTRAMUSCULAR; INTRAVENOUS ONCE
Status: COMPLETED | OUTPATIENT
Start: 2023-06-03 | End: 2023-06-03

## 2023-06-03 RX ORDER — HEPARIN SODIUM 5000 [USP'U]/ML
7500 INJECTION, SOLUTION INTRAVENOUS; SUBCUTANEOUS EVERY 12 HOURS
Status: DISCONTINUED | OUTPATIENT
Start: 2023-06-03 | End: 2023-06-03

## 2023-06-03 RX ORDER — PROCHLORPERAZINE EDISYLATE 5 MG/ML
10 INJECTION INTRAMUSCULAR; INTRAVENOUS EVERY 6 HOURS PRN
Status: DISCONTINUED | OUTPATIENT
Start: 2023-06-03 | End: 2023-06-06 | Stop reason: HOSPADM

## 2023-06-03 RX ORDER — MORPHINE SULFATE 2 MG/ML
2 INJECTION, SOLUTION INTRAMUSCULAR; INTRAVENOUS EVERY 4 HOURS PRN
Status: DISCONTINUED | OUTPATIENT
Start: 2023-06-03 | End: 2023-06-06 | Stop reason: HOSPADM

## 2023-06-03 RX ORDER — BISACODYL 10 MG
10 SUPPOSITORY, RECTAL RECTAL DAILY PRN
Status: DISCONTINUED | OUTPATIENT
Start: 2023-06-03 | End: 2023-06-06 | Stop reason: HOSPADM

## 2023-06-03 RX ORDER — MORPHINE SULFATE 4 MG/ML
4 INJECTION, SOLUTION INTRAMUSCULAR; INTRAVENOUS
Status: DISCONTINUED | OUTPATIENT
Start: 2023-06-03 | End: 2023-06-06 | Stop reason: HOSPADM

## 2023-06-03 RX ORDER — FUROSEMIDE 10 MG/ML
40 INJECTION INTRAMUSCULAR; INTRAVENOUS ONCE
Status: COMPLETED | OUTPATIENT
Start: 2023-06-03 | End: 2023-06-03

## 2023-06-03 RX ADMIN — GLYCOPYRROLATE 0.1 MG: 0.2 INJECTION INTRAMUSCULAR; INTRAVENOUS at 03:06

## 2023-06-03 RX ADMIN — LORAZEPAM 0.5 MG: 2 INJECTION INTRAMUSCULAR; INTRAVENOUS at 08:06

## 2023-06-03 RX ADMIN — PIPERACILLIN AND TAZOBACTAM 4.5 G: 4; .5 INJECTION, POWDER, LYOPHILIZED, FOR SOLUTION INTRAVENOUS; PARENTERAL at 04:06

## 2023-06-03 RX ADMIN — FUROSEMIDE 40 MG: 10 INJECTION, SOLUTION INTRAMUSCULAR; INTRAVENOUS at 02:06

## 2023-06-03 RX ADMIN — HEPARIN SODIUM 7500 UNITS: 5000 INJECTION INTRAVENOUS; SUBCUTANEOUS at 12:06

## 2023-06-03 RX ADMIN — MORPHINE SULFATE 2 MG: 2 INJECTION, SOLUTION INTRAMUSCULAR; INTRAVENOUS at 04:06

## 2023-06-03 RX ADMIN — MAGNESIUM SULFATE HEPTAHYDRATE 1 G: 500 INJECTION, SOLUTION INTRAMUSCULAR; INTRAVENOUS at 09:06

## 2023-06-03 RX ADMIN — PIPERACILLIN AND TAZOBACTAM 4.5 G: 4; .5 INJECTION, POWDER, LYOPHILIZED, FOR SOLUTION INTRAVENOUS; PARENTERAL at 11:06

## 2023-06-03 NOTE — NURSING
Nurses Note -- 4 Eyes      6/3/2023   7:48 AM      Skin assessed during: Admit      [] No Altered Skin Integrity Present    []Prevention Measures Documented      [x] Yes- Altered Skin Integrity Present or Discovered   [x] LDA Added if Not in Epic (Describe Wound)   [x] New Altered Skin Integrity was Present on Admit and Documented in LDA   [] Wound Image Taken    Wound Care Consulted? Yes    Attending Nurse:  Dilma Vance RN     Second RN/Staff Member:  YRN Gonzales

## 2023-06-03 NOTE — SUBJECTIVE & OBJECTIVE
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    Benign essential hypertension 11/14/2012    Cataract     s/p phacoemulsification    Chronic kidney disease (CKD), stage III (moderate) 9/12/2013    Diabetic peripheral neuropathy associated with type 2 diabetes mellitus 11/14/2014    causing right hemiparesis    Gait disorder     Hyperlipidemia     Iritis - Both Eyes 6/10/2013    Kidney stone     Lymphedema     Morbid obesity with BMI of 40.0-44.9, adult 2/18/2015    Nephrolithiasis 4/20/2016    NS (nuclear sclerosis) 4/1/2013    Nuclear sclerosis - Both Eyes 4/29/2013    Preseptal cellulitis - Right Eye 4/29/2013    Proliferative diabetic retinopathy - Both Eyes 4/29/2013    Proliferative diabetic retinopathy, both eyes 4/1/2013    PSC (posterior subcapsular cataract) - Both Eyes 4/29/2013    S/P hernia repair 12/19/2012    TIA (transient ischemic attack) 11/18/2014    Tinea pedis 7/24/2012    Tinea pedis is present on both feet.     Type 2 diabetes mellitus with diabetic polyneuropathy, with long-term current use of insulin 9/18/2015    Type 2 diabetes mellitus with renal manifestations, controlled 12/12/2013    Type 2 diabetes, controlled, with moderate nonproliferative diabetic retinopathy without macular edema 9/17/2015    Ulcer of left lower extremity, limited to breakdown of skin 7/8/2015    Unspecified cerebral artery occlusion with cerebral infarction 11/16/2014    Unspecified venous (peripheral) insufficiency     Ureteral stone with hydronephrosis 1/27/2016    UTI (lower urinary tract infection)     Vaginal infection     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       Past Surgical History:   Procedure Laterality Date    ABLATION Bilateral 6/23/2022    Procedure: Ablation;  Surgeon: Vahid Farfan MD;  Location: [facility] CATH LAB/EP;  Service: Cardiology;  Laterality: Bilateral;    ABLATION Right 11/17/2022    Procedure: Ablation;  Surgeon: Vahid Farfan MD;   Location: [facility] CATH LAB/EP;  Service: Cardiology;  Laterality: Right;    APPENDECTOMY      CATARACT EXTRACTION W/  INTRAOCULAR LENS IMPLANT Left 5/21/2013    CATARACT EXTRACTION W/  INTRAOCULAR LENS IMPLANT Right 6/4/2013    CHOLECYSTECTOMY      COLONOSCOPY  12/22/2005    normal    COLONOSCOPY N/A 7/14/2022    Procedure: COLONOSCOPY;  Surgeon: Kannan Mace MD;  Location: [facility] ENDO;  Service: Endoscopy;  Laterality: N/A;    ESOPHAGOGASTRODUODENOSCOPY  12/21/2015    hiatal hernia, Schatzki ring    ESOPHAGOGASTRODUODENOSCOPY N/A 7/13/2022    Procedure: EGD (ESOPHAGOGASTRODUODENOSCOPY);  Surgeon: Kannan Mace MD;  Location: [facility] ENDO;  Service: Endoscopy;  Laterality: N/A;    EYE SURGERY Bilateral 2008    laser surgery both eyes    INTRALUMINAL GASTROINTESTINAL TRACT IMAGING VIA CAPSULE N/A 7/15/2022    Procedure: IMAGING PROCEDURE, GI TRACT, INTRALUMINAL, VIA CAPSULE;  Surgeon: Kannan Mace MD;  Location: [facility] ENDO;  Service: Endoscopy;  Laterality: N/A;    NASAL SEPTUM SURGERY      SMALL BOWEL ENTEROSCOPY N/A 7/18/2022    Procedure: ENTEROSCOPY Upper SBE;  Surgeon: Salo Frank MD;  Location: [facility] ENDO;  Service: Endoscopy;  Laterality: N/A;    SUBTOTAL COLECTOMY  12/13/2012    transverse colon, for incarcerated umbilical hernia, Dr. Kat Bower       Review of patient's allergies indicates:   Allergen Reactions    Penicillins Hives     Other reaction(s): Hives  Patient has received cefdinir, ceftriaxone, cefazolin and cefepime in the past with no documented reactions    Sulfa (sulfonamide antibiotics) Other (See Comments)     Eyad, pt states her doctor told her the shakes were possibly caused by an allergy to sulfa       Family History       Problem Relation (Age of Onset)    Cataracts Sister, Brother    Diabetes Sister    Heart disease Brother    Leukemia Mother          Tobacco Use    Smoking status: Former     Packs/day: 0.50     Years: 15.00     Pack years: 7.50     Types: Cigarettes      Quit date: 1982     Years since quittin.9    Smokeless tobacco: Former    Tobacco comments:     smoked one pack per week   Substance and Sexual Activity    Alcohol use: No    Drug use: No    Sexual activity: Not Currently         Review of Systems   Unable to perform ROS: Acuity of condition   Objective:     Vital Signs (Most Recent):  Temp: 99.1 °F (37.3 °C) (23 1206)  Pulse: 79 (23 1155)  Resp: (!) 22 (23 0841)  BP: (!) 95/54 (23 0730)  SpO2: (!) 92 % (23 1155) Vital Signs (24h Range):  Temp:  [89.6 °F (32 °C)-99.1 °F (37.3 °C)] 99.1 °F (37.3 °C)  Pulse:  [53-79] 79  Resp:  [13-26] 22  SpO2:  [92 %-100 %] 92 %  BP: ()/(50-62) 95/54     Weight: 118.8 kg (262 lb)  Body mass index is 41.04 kg/m².      Intake/Output Summary (Last 24 hours) at 6/3/2023 1236  Last data filed at 2023 1730  Gross per 24 hour   Intake 2249 ml   Output --   Net 2249 ml        Physical Exam  Vitals and nursing note reviewed.   Constitutional:       General: She is not in acute distress.     Appearance: She is obese. She is ill-appearing and toxic-appearing.   HENT:      Head: Normocephalic and atraumatic.      Mouth/Throat:      Mouth: Mucous membranes are dry.      Pharynx: Oropharynx is clear.   Eyes:      Extraocular Movements: Extraocular movements intact.      Conjunctiva/sclera: Conjunctivae normal.   Cardiovascular:      Rate and Rhythm: Normal rate and regular rhythm.      Heart sounds: No murmur heard.  Pulmonary:      Effort: No respiratory distress.      Breath sounds: Rhonchi present. No wheezing or rales.   Abdominal:      General: There is no distension.      Palpations: Abdomen is soft.   Musculoskeletal:         General: No swelling or tenderness.      Cervical back: Normal range of motion and neck supple.      Right lower leg: Edema present.      Left lower leg: Edema present.   Skin:     General: Skin is warm and dry.      Comments: BL LE skin breakdown   Neurological:       Cranial Nerves: No cranial nerve deficit.      Comments: Opens eyes spontaneously, not following commands        Vents:       Lines/Drains/Airways       Drain  Duration                  Urethral Catheter 06/02/23 1945 Temperature probe 16 Fr. <1 day              Peripheral Intravenous Line  Duration                  Peripheral IV - Single Lumen 06/02/23 1053 20 G Left Antecubital 1 day         Peripheral IV - Single Lumen 06/02/23 1058 20 G Anterior;Right Forearm 1 day                    Significant Labs:    CBC/Anemia Profile:  Recent Labs   Lab 06/02/23  1106 06/02/23  1109 06/03/23  0558   WBC  --  1.85* 2.70*   HGB  --  10.2* 9.1*   HCT 32* 33.8* 31.5*   PLT  --  90* 107*   MCV  --  81* 83   RDW  --  18.8* 19.1*        Chemistries:  Recent Labs   Lab 06/02/23  1109 06/02/23  2157 06/03/23  0558    139 140   K 3.7 3.8 3.9    104 105   CO2 26 30* 25   BUN 25* 26* 25*   CREATININE 2.1* 2.0* 2.2*   CALCIUM 9.1 8.6* 8.8   ALBUMIN 2.4*  --  2.1*   PROT 6.1  --  5.7*   BILITOT 0.3  --  0.3   ALKPHOS 177*  --  160*   ALT 33  --  26   AST 54*  --  51*   MG  --  1.8 1.8   PHOS  --  3.3 3.3       All pertinent labs within the past 24 hours have been reviewed.    Significant Imaging:   I have reviewed all pertinent imaging results/findings within the past 24 hours.    CT chest without contrast with very small bilateral effusions and LLL opacity without any air bronchograms, most consistent with atelectasis although pneumonia remains on ddx

## 2023-06-03 NOTE — CONSULTS
RD consulted for altered skin integrity. No documented PI's, DTI's, surgical wounds or Diabetic ulcers. Media reviewed from 5/29. No new media provided. Wound does not meet criteria for nutritional intervention. No nutrition intervention needs identified at this time. RD to monitor and follow up, please re-consult as needed.      Thank you for the consult,    Suyapa Mark MS RDN [facility]

## 2023-06-03 NOTE — ASSESSMENT & PLAN NOTE
Sherin Ortiz is a 78 y.o. female with PMHx of HTN, HLD, DM, debility, HFpEF, TIA, CKD4, and PVD who presented to ED via EMS from [facility] for AMS with episode of unresponsiveness after eating breakfast. She had some response to narcan administered by EMS. According to her family, she was slow to respond yesterday and hallucinating  family members. Family has been concerned about possible stroke causing her dysphagia. Stroke team was consulted.    Of note, she was recently admitted and discharged from [facility] where she was hospitalized for dysphagia and progressive decline. She was hypothermic, pancytopenic, and had a L loculated pleural effusion. Patient was evaluated by SLP and had MBSS, SLP suspecting presphagia and presbylaryngis. Per goals of care discussion with patient and family during this admission, patient declined further invasive treatments, patient DNR and LaPOST in chart, plans were for outpatient palliative care appointment.    Exam limited by patient's mental status. However, no focal neurologic deficit noted on exam. CT with motion artifact. MRI negative for acute stroke. Low suspicion for cerebrovascular origin for patient's symptoms. Stroke team will sign off. Please contact [phone] with any questions or concerns.

## 2023-06-03 NOTE — CLINICAL REVIEW
Sepsis Screen (most recent)       Sepsis Screen (IP) - 06/03/23 1510       Is the patient's history or complaint suggestive of a possible infection? Yes  -    Are there at least two of the following signs and symptoms present? Yes  -    Sepsis signs/symptoms - Tachypnea Tachypnea     >20  -    Sepsis signs/symptoms - WBC WBC < 4,000 or WBC > 12,000  -    Are any of the following organ dysfunction criteria present and not considered to be due to a chronic condition? Yes   CKD -    Organ Dysfunction Criteria Creatinine > 2.0  -    Organ Dysfunction Criteria - Resp Comp Respiratory Compromise: Requiring > 5L NC  -    Initiate Sepsis Protocol No  -    Reason sepsis not considered End of Life Care   comfort measures initiated -              User Key  (r) = Recorded By, (t) = Taken By, (c) = Cosigned By      Initials Name     Marya Arellano RN

## 2023-06-03 NOTE — ED NOTES
Telemetry Verification   Patient placed on Telemetry Box  Verified with War Room  Box # [id number]   Monitor Tech    Rate 61   Rhythm Normal sinus

## 2023-06-03 NOTE — NURSING
Pt was noted to desat in 80's. Oxygen placed. Provider at bedside.  Oxygen on 10L and sats remain in 80's. Orders received. Will continue to monitor.

## 2023-06-03 NOTE — PLAN OF CARE
Patient arrived from the ER. Daughter accompanied her. Patient is alert but oriented x0. She is mumbling to herself incoherently. She doesn't track anybody in the room. She doesn't respond to any of us calling her name. She is not following any instructions but she does respond to pain. She is mentally in her own world. Daughter states she has been talking to  relatives all day and she has been unable to snap her out of the daze she is in. She is bradycardic and blood pressures are very soft. She doesn't appear to have any distress, but she doesn't look great. She is very pale. She has a lot of generalized bruising and what appears to be a yeasty like rash in multiple areas under both breasts, in her groins and inner thighs, and theres an area on her right hip that's very reddened, skin is intact, but theres a lot of dry flaking skin. BLE have lymphedema and multiple stasis ulcers. Wound care consult was placed. Sacral area is intact but reddened (non-blanchable) but I did cream her bottom and other areas very well with barrier ointment. Hernesto dilip is on patient due to hypothermia. Her temps are within normal limits at this time, but hernesto cherry remains. Daughter states this has not been the first time she has had hypothermia but states doctors have never been able to figure out why.

## 2023-06-03 NOTE — HPI
"Ms. Ortiz is a 78 y.o. female presents with HTN, DM2, HLD, obesity, HFpEF, CKDIV who presented from [facility] for AMS. Pulmonary consulted for left sided pleural effusion. Of note, patient was seen by pulmonology 1 week ago for L sided pleural effusion.     Patient was recently hospitalized at [facility] for dysphagia and was also treated for pneumonia. Patient with frailty syndrome with recurrent hospitalizations and continued goals of care discussions. Patient with recurrent dysphagia  and  aspiration.Patient had previously expressed that she is tired of being in the hospital and no longer wishes to have invasive medical procedures performed. She expressed that she just wants to "die at home". Patient was discharged from [facility] 6/1/23 after completing a course of abx for possible pneumonia. Per family, she was somewhat alert at the time of discharge, but one day following was noted to be more lethargic so the NH called to bring her into the ED. In the ED her labs were relatively unchanged from previous, but she was noted to be hypothermic requiring chandana hugger. She was started back on abx.     At last  hospitalization, bedside POCUS of left pleural effusion completed with significant difficulty due to patient inability to cooperative or move herself into appropriate position with small pl eff. POCUS brought to bedside, but US deferred by family as it would cause discomfort. CT chest with small bilateral pl eff with overlying atelectasis and possible airspace dz, although no clear pneumonia.    On discussion at bedside with daughter Jazmyn she expressed a desire to transition the patient's care to comfort measures only, and confirmed that the patient would not want aggressive measures or invasive procedures. She plans to call her other 2 siblings in for a family meeting with the primary team.   "

## 2023-06-03 NOTE — ASSESSMENT & PLAN NOTE
Advance Care Planning   Discussed with daughter eli and granddaughter at bedside and they would prefer to proceed with comfort care measures only. Eli is going to call her two siblings today to come in for a family meeting. Discussed with primary team, they are aware

## 2023-06-03 NOTE — CHAPLAIN
"Date of Visit: Sweta 3, 2023  Length of Visit: 10 minutes    Consult had been requested in [facility] for end of life  for family.  stopped by and daughter at bedside reported that they had "decided to stop invasive treatment and just let her be comfortable." No other needs were expressed.     With Chaplain Yamini pike, [phone]  "

## 2023-06-03 NOTE — PLAN OF CARE
[facility] - Intensive Care ([place] [address])  Initial Discharge Assessment       Primary Care Provider: Darby Baum MD    Admission Diagnosis: Bradycardia [R00.1]  Chest pain [R07.9]  Hypothermia, initial encounter [T68.XXXA]  Altered mental status, unspecified altered mental status type [R41.82]  Pneumonia due to infectious organism, unspecified laterality, unspecified part of lung [J18.9]  Sepsis, due to unspecified organism, unspecified whether acute organ dysfunction present [A41.9]    Admission Date: 6/2/2023  Expected Discharge Date: 6/5/2023    Transition of Care Barriers: None    Payor: [employer] MANAGED MEDICARE / Plan: [employer] MEDICARE HMO / Product Type: Capitation /     Extended Emergency Contact Information  Primary Emergency Contact: Jazmyn Blanchard   [place]  Mobile Phone: [phone]  Relation: Daughter  Secondary Emergency Contact: Hayley Velasco   [place]  Mobile Phone: [phone]  Relation: Daughter    Discharge Plan A: Comfort care/withdrawal  Discharge Plan B: Hospice/home      [facility] - [facility] [address]  [address] [address]  Phone: [phone] Fax: [phone]    [id number] - [place] - [address] AT [facility] [place]  [address] [address]  Phone: [phone] Fax: [phone]    SW met with patient and patient's daughter at bedside to complete discharge planning assessment.  Daughters verified all demographic information on facesheet is correct.  Daughters verified PCP is Dr. Baum.  Daughter verified primary health insurance is [employer].  Patient active with [employer] and has listed DME.  Patient with NO POA or Living Will.  Patient not on dialysis or medication coumadin.  Patient with no 30 day admission.  Patient with no financial issues at this time.  Patient family will provide transportation upon discharge from facility.   Patient independent with ADLs, live alone, family drives.      Initial Assessment (most recent)       Adult Discharge Assessment - 06/03/23 1551          Discharge Assessment    Assessment Type Discharge Planning Assessment     Confirmed/corrected address, phone number and insurance Yes     Confirmed Demographics Correct on Facesheet     Source of Information family;patient     Communicated GABE with patient/caregiver Yes     People in Home alone     Facility Arrived From: home     Do you expect to return to your current living situation? Yes     Do you have help at home or someone to help you manage your care at home? Yes     Who are your caregiver(s) and their phone number(s)? daughters     Prior to hospitilization cognitive status: Alert/Oriented     Current cognitive status: Alert/Oriented     Walking or Climbing Stairs ambulation difficulty, requires equipment;stair climbing difficulty, requires equipment     Equipment Currently Used at Home power chair     Readmission within 30 days? Yes     Patient currently being followed by outpatient case management? No     Do you currently have service(s) that help you manage your care at home? Yes     Name and Contact number of agency [facility] the [facility]     Is the pt/caregiver preference to resume services with current agency Yes     Do you take prescription medications? Yes     Do you have prescription coverage? Yes     Do you have any problems affording any of your prescribed medications? No     Is the patient taking medications as prescribed? yes     Who is going to help you get home at discharge? daughters     How do you get to doctors appointments? family or friend will provide     Are you on dialysis? No     Do you take coumadin? No     Discharge Plan A Comfort care/withdrawal     Discharge Plan B Hospice/home     DME Needed Upon Discharge  none     Discharge Plan discussed with: Patient;Adult children     Transition of Care Barriers None

## 2023-06-03 NOTE — RESPIRATORY THERAPY
RAPID RESPONSE RESPIRATORY THERAPY PROACTIVE ROUNDING NOTE             Time of visit: 0841     Code Status: DNR   : 1945  Bed: 91833/50130 A:   MRN: [id number]  Time spent at the bedside: < 15 min    SITUATION    Evaluated patient for: HFNC Compliance     BACKGROUND    Patient has a past medical history of Allergy, Asteroid hyalosis - Left Eye, Benign essential hypertension, Cataract, Chronic kidney disease (CKD), stage III (moderate), Diabetic peripheral neuropathy associated with type 2 diabetes mellitus, Gait disorder, Hyperlipidemia, Iritis - Both Eyes, Kidney stone, Lymphedema, Morbid obesity with BMI of 40.0-44.9, adult, Nephrolithiasis, NS (nuclear sclerosis), Nuclear sclerosis - Both Eyes, Preseptal cellulitis - Right Eye, Proliferative diabetic retinopathy - Both Eyes, Proliferative diabetic retinopathy, both eyes, PSC (posterior subcapsular cataract) - Both Eyes, S/P hernia repair, TIA (transient ischemic attack), Tinea pedis, Type 2 diabetes mellitus with diabetic polyneuropathy, with long-term current use of insulin, Type 2 diabetes mellitus with renal manifestations, controlled, Type 2 diabetes, controlled, with moderate nonproliferative diabetic retinopathy without macular edema, Ulcer of left lower extremity, limited to breakdown of skin, Unspecified cerebral artery occlusion with cerebral infarction, Unspecified venous (peripheral) insufficiency, Ureteral stone with hydronephrosis, UTI (lower urinary tract infection), Vaginal infection, and Vertical heterotropia - Both Eyes.    24 Hours Vitals Range:  Temp:  [88.9 °F (31.6 °C)-98.7 °F (37.1 °C)]   Pulse:  [53-78]   Resp:  [13-26]   BP: ()/(50-80)   SpO2:  [92 %-100 %]     Labs:    Recent Labs     23  1109 23  2157 23  0558    139 140   K 3.7 3.8 3.9    104 105   CO2 26 30* 25   CREATININE 2.1* 2.0* 2.2*   * 179* 109   PHOS  --  3.3 3.3   MG  --  1.8 1.8        Recent Labs     23  1111   PH 7.303*    PCO2 61.0*   PO2 63*   HCO3 30.2*   POCSATURATED 89*   BE 4       ASSESSMENT/INTERVENTIONS    Patient resting comfortably. No respiratory concerns at this time.    Last VS   Temp: 98.7 °F (37.1 °C) (06/03 0730)  Pulse: 78 (06/03 0841)  Resp: 22 (06/03 0841)  BP: 95/54 (06/03 0730)  SpO2: 92 % (06/03 0841)    Level of Consciousness: Level of Consciousness (AVPU): alert  Respiratory Effort: Respiratory Effort: Normal, Unlabored Expansion/Accessory Muscle Usage: Expansion/Accessory Muscles/Retractions: no retractions, no use of accessory muscles, expansion symmetric  All Lung Field Breath Sounds: All Lung Fields Breath Sounds: Anterior:, clear  O2 Device/Concentration: RA  Was the O2 device able to be weaned? No  Ambu at bedside:      Active Orders   Respiratory Care    Oxygen Continuous     Frequency: Continuous     Number of Occurrences: Until Specified     Order Questions:      Device type: High flow      Device: High Flow Nasal Cannula (6 -15 Liters)      LPM: 15      Titrate O2 per Oxygen Titration Protocol: Yes      To maintain SpO2 goal of: >= 90%      Notify MD of: Inability to achieve desired SpO2; Sudden change in patient status and requires 20% increase in FiO2; Patient requires >60% FiO2    Pulse Oximetry Q4H     Frequency: Q4H     Number of Occurrences: Until Specified       RECOMMENDATIONS    We recommend: RRT Recs: Continue POC per primary team.      FOLLOW-UP    Please call back the Rapid Response RT, Suyapa Carmichael, RRT at x [phone] for any questions or concerns.

## 2023-06-03 NOTE — PROGRESS NOTES
VANCOMYCIN DOSING BY PHARMACY DISCONTINUATION NOTE    Sherin Ortiz is a 78 y.o. female who had been consulted for vancomycin dosing.    The pharmacy consult for vancomycin dosing has been discontinued.     Vancomycin Dosing by Pharmacy Consult will sign-off. Please reconsult if necessary. Thank you for allowing us to participate in this patient's care.     Thank you for allowing pharmacy participate in this patient's care.     Lluvia Wolfe, PharmD  Clinical Pharmacist, [facility] Team  [email or web address]  office [phone]

## 2023-06-03 NOTE — HOSPITAL COURSE
Admitted for severe sepsis a/w encephalopathy, unclear source, tho possible respiratory in s/o ongoing BL pleural effusions, L > R.  Pulm consulted, low c/f empyema & not recommending thoracentesis 2/2 risk outweighing benfits. Discussed with family poor prognosis given multiple co-morbidities, family electing for comfort care per patient's wishes from prior hospitalizations. Working with CM regarding in/out of hospital hospice options.

## 2023-06-03 NOTE — PROGRESS NOTES
[facility] - Intensive Care ([address] Medicine  Progress Note    Patient Name: Sherin Ortiz  MRN: [id number]  Patient Class: IP- Inpatient   Admission Date: 6/2/2023  Length of Stay: 1 days  Attending Physician: Melissa Alcantar MD  Primary Care Provider: Darby Baum MD        Subjective:     Principal Problem:Severe sepsis        HPI:  78 y.o. female w/ h/o stroke, HFpEF (60%), PHTN, CKD 4, DM2, HTN, HLD; p/w AMS.  Hx taken mostly from son & daughter.  Pt Px from [facility] w/ GCS 7; last known normal was at 07:30 today by nursing staff.  Pt was found nonverbal at 09:50 w/ shallow respirations.  Stroke code was activated in ED w/ initial head scans u/r; noted that pt isn't a candidate for CTA; eval'd to unlikely be a stroke per Stroke team.  Pt also hypothermic & in AF on arrival.  On arrival to ED, pt remains nonverbal & unable to provide Hx, tho pt opens eyes to verbal stimuli & withdraws to painful stimuli.  Pt mentation improved s/p passive rewarming w/ bear hugger.    Notably, pt was recently hospitalized 05/26-05/31 for dysphagia, found to have large loculated L pleural effusion, & found to be hypothermic, pancytopenic, & hyperkalemic; improved s/p empiric Abx. Pt was also hospitalized 04/28-05/05 for SOB & weakness, found to have LLL CAP; improved s/p Abx.    ED course: T 90.1 F, HR 60, /80, 99% ORA.  Labs u/r except for WBC 1.85, Hb 10.2 (b/l 9-10), Plt 90, Cr 2.1 (b/l 2.0-2.1), , TSH 5.769.  UA u/r.  CXR w/ opacification of L lung base likely representing combo of pleural effusion w/ atelectasis/consolidation; similar patchy airspace & interstitial type opacities at R lung base.  CTH w/o definite acute ICH, tho limited 2/2 motion artifact.  Given IVF 2 L, started on vanc/pip-tazo.      Overview/Hospital Course:  Admitted for severe sepsis a/w encephalopathy, unclear source, tho possible respiratory in s/o ongoing BL pleural effusions, L > R.  Pulm consulted, low  c/f empyema & not recommending thoracentesis 2/2 high risk.  Ongoing family discussions re: GOC.      Interval History: NAEON. Sxs improving. Alert but nonverbal & not at b/l mentation.     Review of Systems  Objective:     Vital Signs (Most Recent):  Temp: 98.7 °F (37.1 °C) (06/03/23 0730)  Pulse: 78 (06/03/23 0841)  Resp: (!) 22 (06/03/23 0841)  BP: (!) 95/54 (06/03/23 0730)  SpO2: (!) 92 % (06/03/23 0841) Vital Signs (24h Range):  Temp:  [88.9 °F (31.6 °C)-98.7 °F (37.1 °C)] 98.7 °F (37.1 °C)  Pulse:  [53-78] 78  Resp:  [13-26] 22  SpO2:  [92 %-100 %] 92 %  BP: ()/(50-62) 95/54     Weight: 118.8 kg (262 lb)  Body mass index is 41.04 kg/m².    Intake/Output Summary (Last 24 hours) at 6/3/2023 1126  Last data filed at 6/2/2023 1730  Gross per 24 hour   Intake 2249 ml   Output --   Net 2249 ml         Physical Exam  Vitals and nursing note reviewed.   Constitutional:       General: She is not in acute distress.     Appearance: She is obese. She is ill-appearing (chronically). She is not toxic-appearing or diaphoretic.   HENT:      Head: Normocephalic and atraumatic.      Right Ear: External ear normal.      Left Ear: External ear normal.      Mouth/Throat:      Mouth: Mucous membranes are moist.      Pharynx: No oropharyngeal exudate.      Comments: Poor dentition  Eyes:      Extraocular Movements: Extraocular movements intact.      Pupils: Pupils are equal, round, and reactive to light.   Cardiovascular:      Rate and Rhythm: Normal rate and regular rhythm.      Pulses: Normal pulses.      Heart sounds: Normal heart sounds. No murmur heard.  Pulmonary:      Effort: Pulmonary effort is normal. No respiratory distress.      Breath sounds: Normal breath sounds. No wheezing or rales.   Abdominal:      General: Abdomen is flat. Bowel sounds are normal. There is no distension.      Palpations: Abdomen is soft. There is no mass.      Tenderness: There is no abdominal tenderness.   Musculoskeletal:         General:  No swelling or tenderness. Normal range of motion.      Cervical back: Normal range of motion and neck supple.      Right lower leg: No edema.      Left lower leg: Edema present.   Skin:     General: Skin is warm and dry.      Capillary Refill: Capillary refill takes less than 2 seconds.      Coloration: Skin is pale.      Comments: On bearhugger   Neurological:      General: No focal deficit present.      Mental Status: She is alert. She is disoriented.      Gait: Gait abnormal.   Psychiatric:         Mood and Affect: Mood normal.         Behavior: Behavior normal.           Significant Labs: All pertinent labs within the past 24 hours have been reviewed.    Significant Imaging: I have reviewed and interpreted all pertinent imaging results/findings within the past 24 hours.    Assessment/Plan:      * Severe sepsis  Pleural effusion, left    This patient does have evidence of infective focus.  My overall impression is sepsis. Source: Respiratory. Antibiotics given-   Antibiotics (72h ago, onward)      Start     Stop Route Frequency Ordered    06/02/23 2000  piperacillin-tazobactam (ZOSYN) 4.5 g in dextrose 5 % in water (D5W) 5 % 100 mL IVPB (MB+)         -- IV Every 8 hours (non-standard times) 06/02/23 1425    06/02/23 1524  vancomycin - pharmacy to dose  (vancomycin IVPB)        See Hyperspace for full Linked Orders Report.    -- IV pharmacy to manage frequency 06/02/23 1424          Latest lactate reviewed-  Recent Labs   Lab 06/02/23  1610 06/02/23  2157   LACTATE 1.0 1.0    Organ dysfunction indicated by Acute kidney injury, Encephalopathy and Thrombocytopenia   Fluid challenge Ideal Body Weight- The patient's ideal body weight is Ideal body weight: 61.6 kg (135 lb 12.9 oz) which will be used to calculate fluid bolus of 30 ml/kg for treatment of septic shock.    Post- resuscitation assessment Yes Perfusion exam was performed within 6 hours of septic shock presentation after bolus shows Adequate tissue perfusion  assessed by non-invasive monitoring   Will Not start Pressors- Levophed for MAP of 65. Source control achieved by: Pending    Pt previously hospitalized x2 over last mos also for sepsis 2/2 CAP w/ L pleural effusion, improved w/ IV Abx.  Unclear if source control was truly achieved previously in s/o no thoracentesis performed 2/2 risks outweighing benefits.  On this admission, pt w/ hypothermia & leukopenia c/w SIRS w/ likely LLL effusion (possibly infected) vs PNA (CAP vs aspiration in s/o AMS) w/ encephalopathy, c/w severe sepsis.  VSx improved s/p IVF & bear hugger.  UA w/o e/o UTI; would also consider CT CAP w/ contrast to eval for other intrathoracic/intra-abdo source, however would be cautious w/ contrast w/ pt's CKD.  No recent BCx data.  DDx infected pleural effusion vs empyema vs intra-abdo vs cutaneous vs bacteremia.  Also malignancy (leukemia vs lymphoma) given pancytopenia vs autoimmune vs underlying autonomic dysfxn.    - trend LA q.3 until resolution  - f/u BCx  - Abx, tailor p.r.n. Cx; Vanc/pip-tazo/azithro  - cardiac tele w/ pulse ox, supp O2 p.r.n. goal SpO2 > 92%, fall precautions, delirium precautions, aspiration precautions, neuro checks q.4  - diet NPO  - hold antiHTVs  - avoid sedating agents     Pancytopenia  - monitor  - CBC q.d.     (HFpEF) heart failure with preserved ejection fraction  Bilateral leg edema    - hold Furosemide in s/o sepsis; restart as clinically indicated     PAOD (peripheral arterial occlusive disease)  Hx of transient ischemic attack (TIA)    - monitor  - home ASA     Type 2 diabetes mellitus with diabetic polyneuropathy, with long-term current use of insulin  Patient's FSGs are controlled on current medication regimen.  Last A1c reviewed-   Lab Results   Component Value Date    HGBA1C 8.1 (H) 03/23/2023     Most recent fingerstick glucose reviewed-   Recent Labs   Lab 06/02/23  2156 06/03/23  0752   POCTGLUCOSE 188* 115*     Current correctional scale  Low  Maintain  anti-hyperglycemic dose as follows-   Antihyperglycemics (From admission, onward)      Start     Stop Route Frequency Ordered    06/02/23 2100  insulin detemir U-100 (Levemir) pen 10 Units         -- SubQ Nightly 06/02/23 1424    06/02/23 1521  insulin aspart U-100 pen 0-5 Units         -- SubQ Before meals & nightly PRN 06/02/23 1424          Hold Oral hypoglycemics while patient is in the hospital.    - LDSSI, detemir; titrate p.r.n. goal Glc 140-180  - POCT Glc a.c. & q.h.s.  - diet diabetic, when able to take PO  - hold PO antiGlcs     Stage 4 chronic kidney disease  - UA, UPCR, uCr, Cindy, uUrea  - US RP  - renally dose Rx  - strict I/Os, bladder scan p.r.n. UR, straight/Sanchez cath p.r.n. bladder scan > 300 cc  - hold NSAIDs  - avoid nephrotoxic agents     Debility  - Consider Pall Care consult in s/o worsening fxnal b/l consulted     Essential hypertension  - hold antiHTVs in s/o sepsis; restart as clinically indicated     Hyperlipidemia LDL goal <100  - home statin       VTE Risk Mitigation (From admission, onward)           Ordered     heparin (porcine) injection 7,500 Units  Every 12 hours         06/03/23 1059     IP VTE HIGH RISK PATIENT  Once         06/02/23 1424     Place sequential compression device  Until discontinued         06/02/23 1424                    Discharge Planning   GABE: 6/5/2023     Code Status: DNR   Is the patient medically ready for discharge?: No    Reason for patient still in hospital (select all that apply): Treatment and Consult recommendations                     Mike Avendano MD  Department of Hospital Medicine   [facility] - Intensive Care ([place]-[facility])

## 2023-06-03 NOTE — PROGRESS NOTES
Pharmacokinetic Assessment Follow Up: IV Vancomycin    Vancomycin serum concentration assessment(s):  -random level ~17.5 hr after dose = 33.6  -Scr increased 2->2.2    Vancomycin Regimen Plan:  -no need for vanc dose today as level was supratherapeutic  -random level ordered for 6/4 w/ AM labs  -will redose once level is < 20    Drug levels (last 3 results):  Recent Labs   Lab Result Units 06/03/23  0558   Vancomycin, Random ug/mL 33.6       Pharmacy will continue to follow and monitor vancomycin.    Please contact pharmacy at extension [phone] for questions regarding this assessment.    Thank you for the consult,   Kari Bowie       Patient brief summary:  Sherin Ortiz is a 78 y.o. female initiated on antimicrobial therapy with IV Vancomycin for treatment of lower respiratory infection    The patient's current regimen is pulse dosing    Drug Allergies:   Review of patient's allergies indicates:   Allergen Reactions    Penicillins Hives     Other reaction(s): Hives  Patient has received cefdinir, ceftriaxone, cefazolin and cefepime in the past with no documented reactions    Sulfa (sulfonamide antibiotics) Other (See Comments)     Eyad, pt states her doctor told her the shakes were possibly caused by an allergy to sulfa       Actual Body Weight:   118.8 kg    Renal Function:   Estimated Creatinine Clearance: 27.2 mL/min (A) (based on SCr of 2.2 mg/dL (H)).,     Dialysis Method (if applicable):  N/A    CBC (last 72 hours):  Recent Labs   Lab Result Units 06/02/23  1109 06/03/23  0558   WBC K/uL 1.85* 2.70*   Hemoglobin g/dL 10.2* 9.1*   Hematocrit % 33.8* 31.5*   Platelets K/uL 90* 107*   Gran % % 60.5 69.6   Lymph % % 26.5 17.4*   Mono % % 11.4 11.9   Eosinophil % % 1.1 0.7   Basophil % % 0.5 0.0   Differential Method  Automated Automated       Metabolic Panel (last 72 hours):  Recent Labs   Lab Result Units 06/02/23  1109 06/02/23  1132 06/02/23  2157 06/03/23  0558   Sodium mmol/L 143  --  139 140    Sodium, Urine mmol/L  --  55  --   --    Potassium mmol/L 3.7  --  3.8 3.9   Chloride mmol/L 107  --  104 105   CO2 mmol/L 26  --  30* 25   Glucose mg/dL 161*  --  179* 109   Glucose, UA   --  3+*  --   --    BUN mg/dL 25*  --  26* 25*   Creatinine mg/dL 2.1*  --  2.0* 2.2*   Creatinine, Urine mg/dL  --  59.0  --   --    Albumin g/dL 2.4*  --   --  2.1*   Total Bilirubin mg/dL 0.3  --   --  0.3   Alkaline Phosphatase U/L 177*  --   --  160*   AST U/L 54*  --   --  51*   ALT U/L 33  --   --  26   Magnesium mg/dL  --   --  1.8 1.8   Phosphorus mg/dL  --   --  3.3 3.3       Vancomycin Administrations:  vancomycin given in the last 96 hours                     vancomycin 2 g in dextrose 5 % 500 mL IVPB (mg) 2,000 mg New Bag 06/02/23 1239                    Microbiologic Results:  Microbiology Results (last 7 days)       Procedure Component Value Units Date/Time    Blood culture x two cultures. Draw prior to antibiotics. [[id number]] Collected: 06/02/23 1110    Order Status: Completed Specimen: Blood from Peripheral, Forearm, Right Updated: 06/02/23 1915     Blood Culture, Routine No Growth to date    Narrative:      Aerobic and anaerobic    Blood culture x two cultures. Draw prior to antibiotics. [[id number]] Collected: 06/02/23 1110    Order Status: Completed Specimen: Blood from Peripheral, Hand, Right Updated: 06/02/23 1915     Blood Culture, Routine No Growth to date    Narrative:      Aerobic and anaerobic

## 2023-06-03 NOTE — NURSING
End of Shift Summary: Pt resting no apparent distress noted. Morphine 2mg give x1 for increased respiratory effort. Effective. Family at bedside. Remains on comfort measures.

## 2023-06-03 NOTE — SUBJECTIVE & OBJECTIVE
Interval History: Comfort care. Social work to assist with in-patient hospice to allow family to have a more peaceful transition if she meets criteria.    Review of Systems  Objective:     Vital Signs (Most Recent):  Temp: 98.7 °F (37.1 °C) (06/03/23 0730)  Pulse: 78 (06/03/23 0841)  Resp: (!) 22 (06/03/23 0841)  BP: (!) 95/54 (06/03/23 0730)  SpO2: (!) 92 % (06/03/23 0841) Vital Signs (24h Range):  Temp:  [88.9 °F (31.6 °C)-98.7 °F (37.1 °C)] 98.7 °F (37.1 °C)  Pulse:  [53-78] 78  Resp:  [13-26] 22  SpO2:  [92 %-100 %] 92 %  BP: ()/(50-62) 95/54     Weight: 118.8 kg (262 lb)  Body mass index is 41.04 kg/m².    Intake/Output Summary (Last 24 hours) at 6/3/2023 1126  Last data filed at 6/2/2023 1730  Gross per 24 hour   Intake 2249 ml   Output --   Net 2249 ml         Physical Exam  Vitals and nursing note reviewed.   Constitutional:       General: She is not in acute distress.     Appearance: She is obese. She is ill-appearing (chronically). She is not diaphoretic.   HENT:      Head: Normocephalic and atraumatic.      Mouth/Throat:      Comments: Poor dentition  Pulmonary:      Comments: No tachypnea. Occasional coughing.   Abdominal:      General: Abdomen is flat. There is no distension.   Musculoskeletal:      Right lower leg: Edema present.      Left lower leg: Edema present.           Significant Labs: All pertinent labs within the past 24 hours have been reviewed.    Significant Imaging: I have reviewed and interpreted all pertinent imaging results/findings within the past 24 hours.

## 2023-06-03 NOTE — ASSESSMENT & PLAN NOTE
Patient with small bilateral pleural effusions present at least since March 2023. Seen by pulm 1 week ago with no good window and no recommendation for thoracentesis. No evidence that this is empyema. Additionally, prior pulm team discussed risk/benefit with patient and they opted against invasive procedures. Discussed again with family and they stated that they would not want invasive procedures. Additionally, effusions are too small to tap.    Of note, patient with HFpEF and BNP now higher than previous admit - up to 263 in the setting of morbid obesity as well as florid volume overload on exam. Is 2L net + this admit. If not going in the direction of comfort care, recommend diuresis.

## 2023-06-03 NOTE — CONSULTS
"[facility] - Intensive Care ([place] [address])  Pulmonology  Consult Note    Patient Name: Sherin Ortiz  MRN: [id number]  Admission Date: 6/2/2023  Hospital Length of Stay: 1 days  Code Status: DNR  Attending Physician: Melissa Alcantar MD  Primary Care Provider: Darby Baum MD   Principal Problem: Severe sepsis    Inpatient consult to Pulmonology  Consult performed by: Jo Ann Joyner MD  Consult ordered by: Mike Avendano MD        Subjective:     HPI:  Ms. Ortiz is a 78 y.o. female presents with HTN, DM2, HLD, obesity, HFpEF, CKDIV who presented from NH for AMS. Pulmonary consulted for left sided pleural effusion. Of note, patient was seen by pulmonology 1 week ago for L sided pleural effusion.     Patient was recently hospitalized at [facility] for dysphagia and was also treated for pneumonia. Patient with frailty syndrome with recurrent hospitalizations and continued goals of care discussions. Patient with recurrent dysphagia  and  aspiration.Patient had previously expressed that she is tired of being in the hospital and no longer wishes to have invasive medical procedures performed. She expressed that she just wants to "die at home". Patient was discharged from [facility] 6/1/23 after completing a course of abx for possible pneumonia. Per family, she was somewhat alert at the time of discharge, but one day following was noted to be more lethargic so the NH called to bring her into the ED. In the ED her labs were relatively unchanged from previous, but she was noted to be hypothermic requiring chandana hugger. She was started back on abx.     At last  hospitalization, bedside POCUS of left pleural effusion completed with significant difficulty due to patient inability to cooperative or move herself into appropriate position with small pl eff. POCUS brought to bedside, but US deferred by family as it would cause discomfort. CT chest with small bilateral pl eff with overlying atelectasis and possible " airspace dz, although no clear pneumonia.    On discussion at bedside with daughter Jazmyn she expressed a desire to transition the patient's care to comfort measures only, and confirmed that the patient would not want aggressive measures or invasive procedures. She plans to call her other 2 siblings in for a family meeting with the primary team.       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    Benign essential hypertension 11/14/2012    Cataract     s/p phacoemulsification    Chronic kidney disease (CKD), stage III (moderate) 9/12/2013    Diabetic peripheral neuropathy associated with type 2 diabetes mellitus 11/14/2014    causing right hemiparesis    Gait disorder     Hyperlipidemia     Iritis - Both Eyes 6/10/2013    Kidney stone     Lymphedema     Morbid obesity with BMI of 40.0-44.9, adult 2/18/2015    Nephrolithiasis 4/20/2016    NS (nuclear sclerosis) 4/1/2013    Nuclear sclerosis - Both Eyes 4/29/2013    Preseptal cellulitis - Right Eye 4/29/2013    Proliferative diabetic retinopathy - Both Eyes 4/29/2013    Proliferative diabetic retinopathy, both eyes 4/1/2013    PSC (posterior subcapsular cataract) - Both Eyes 4/29/2013    S/P hernia repair 12/19/2012    TIA (transient ischemic attack) 11/18/2014    Tinea pedis 7/24/2012    Tinea pedis is present on both feet.     Type 2 diabetes mellitus with diabetic polyneuropathy, with long-term current use of insulin 9/18/2015    Type 2 diabetes mellitus with renal manifestations, controlled 12/12/2013    Type 2 diabetes, controlled, with moderate nonproliferative diabetic retinopathy without macular edema 9/17/2015    Ulcer of left lower extremity, limited to breakdown of skin 7/8/2015    Unspecified cerebral artery occlusion with cerebral infarction 11/16/2014    Unspecified venous (peripheral) insufficiency     Ureteral stone with hydronephrosis 1/27/2016    UTI (lower urinary  tract infection)     Vaginal infection     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       Past Surgical History:   Procedure Laterality Date    ABLATION Bilateral 6/23/2022    Procedure: Ablation;  Surgeon: Vahid Farfan MD;  Location: [facility] CATH LAB/EP;  Service: Cardiology;  Laterality: Bilateral;    ABLATION Right 11/17/2022    Procedure: Ablation;  Surgeon: Vahid Farfan MD;  Location: [facility] CATH LAB/EP;  Service: Cardiology;  Laterality: Right;    APPENDECTOMY      CATARACT EXTRACTION W/  INTRAOCULAR LENS IMPLANT Left 5/21/2013    CATARACT EXTRACTION W/  INTRAOCULAR LENS IMPLANT Right 6/4/2013    CHOLECYSTECTOMY      COLONOSCOPY  12/22/2005    normal    COLONOSCOPY N/A 7/14/2022    Procedure: COLONOSCOPY;  Surgeon: Kannan Mace MD;  Location: [facility];  Service: Endoscopy;  Laterality: N/A;    ESOPHAGOGASTRODUODENOSCOPY  12/21/2015    hiatal hernia, Schatzki ring    ESOPHAGOGASTRODUODENOSCOPY N/A 7/13/2022    Procedure: EGD (ESOPHAGOGASTRODUODENOSCOPY);  Surgeon: Kannan Mace MD;  Location: [facility];  Service: Endoscopy;  Laterality: N/A;    EYE SURGERY Bilateral 2008    laser surgery both eyes    INTRALUMINAL GASTROINTESTINAL TRACT IMAGING VIA CAPSULE N/A 7/15/2022    Procedure: IMAGING PROCEDURE, GI TRACT, INTRALUMINAL, VIA CAPSULE;  Surgeon: Kannan Mace MD;  Location: [facility];  Service: Endoscopy;  Laterality: N/A;    NASAL SEPTUM SURGERY      SMALL BOWEL ENTEROSCOPY N/A 7/18/2022    Procedure: ENTEROSCOPY Upper SBE;  Surgeon: Salo Frank MD;  Location: [facility];  Service: Endoscopy;  Laterality: N/A;    SUBTOTAL COLECTOMY  12/13/2012    transverse colon, for incarcerated umbilical hernia, Dr. Kat Bower       Review of patient's allergies indicates:   Allergen Reactions    Penicillins Hives     Other reaction(s): Hives  Patient has received cefdinir, ceftriaxone, cefazolin and cefepime in the past with no documented reactions    Sulfa (sulfonamide  antibiotics) Other (See Comments)     Shajosie, pt states her doctor told her the shakes were possibly caused by an allergy to sulfa       Family History       Problem Relation (Age of Onset)    Cataracts Sister, Brother    Diabetes Sister    Heart disease Brother    Leukemia Mother          Tobacco Use    Smoking status: Former     Packs/day: 0.50     Years: 15.00     Pack years: 7.50     Types: Cigarettes     Quit date: 1982     Years since quittin.9    Smokeless tobacco: Former    Tobacco comments:     smoked one pack per week   Substance and Sexual Activity    Alcohol use: No    Drug use: No    Sexual activity: Not Currently         Review of Systems   Unable to perform ROS: Acuity of condition   Objective:     Vital Signs (Most Recent):  Temp: 99.1 °F (37.3 °C) (23 1206)  Pulse: 79 (23 1155)  Resp: (!) 22 (23 0841)  BP: (!) 95/54 (23 0730)  SpO2: (!) 92 % (23 1155) Vital Signs (24h Range):  Temp:  [89.6 °F (32 °C)-99.1 °F (37.3 °C)] 99.1 °F (37.3 °C)  Pulse:  [53-79] 79  Resp:  [13-26] 22  SpO2:  [92 %-100 %] 92 %  BP: ()/(50-62) 95/54     Weight: 118.8 kg (262 lb)  Body mass index is 41.04 kg/m².      Intake/Output Summary (Last 24 hours) at 6/3/2023 1236  Last data filed at 2023 1730  Gross per 24 hour   Intake 2249 ml   Output --   Net 2249 ml        Physical Exam  Vitals and nursing note reviewed.   Constitutional:       General: She is not in acute distress.     Appearance: She is obese. She is ill-appearing and toxic-appearing.   HENT:      Head: Normocephalic and atraumatic.      Mouth/Throat:      Mouth: Mucous membranes are dry.      Pharynx: Oropharynx is clear.   Eyes:      Extraocular Movements: Extraocular movements intact.      Conjunctiva/sclera: Conjunctivae normal.   Cardiovascular:      Rate and Rhythm: Normal rate and regular rhythm.      Heart sounds: No murmur heard.  Pulmonary:      Effort: No respiratory distress.      Breath sounds:  Rhonchi present. No wheezing or rales.   Abdominal:      General: There is no distension.      Palpations: Abdomen is soft.   Musculoskeletal:         General: No swelling or tenderness.      Cervical back: Normal range of motion and neck supple.      Right lower leg: Edema present.      Left lower leg: Edema present.   Skin:     General: Skin is warm and dry.      Comments: BL LE skin breakdown   Neurological:      Cranial Nerves: No cranial nerve deficit.      Comments: Opens eyes spontaneously, not following commands        Vents:       Lines/Drains/Airways       Drain  Duration                  Urethral Catheter 06/02/23 1945 Temperature probe 16 Fr. <1 day              Peripheral Intravenous Line  Duration                  Peripheral IV - Single Lumen 06/02/23 1053 20 G Left Antecubital 1 day         Peripheral IV - Single Lumen 06/02/23 1058 20 G Anterior;Right Forearm 1 day                    Significant Labs:    CBC/Anemia Profile:  Recent Labs   Lab 06/02/23  1106 06/02/23  1109 06/03/23  0558   WBC  --  1.85* 2.70*   HGB  --  10.2* 9.1*   HCT 32* 33.8* 31.5*   PLT  --  90* 107*   MCV  --  81* 83   RDW  --  18.8* 19.1*        Chemistries:  Recent Labs   Lab 06/02/23  1109 06/02/23  2157 06/03/23  0558    139 140   K 3.7 3.8 3.9    104 105   CO2 26 30* 25   BUN 25* 26* 25*   CREATININE 2.1* 2.0* 2.2*   CALCIUM 9.1 8.6* 8.8   ALBUMIN 2.4*  --  2.1*   PROT 6.1  --  5.7*   BILITOT 0.3  --  0.3   ALKPHOS 177*  --  160*   ALT 33  --  26   AST 54*  --  51*   MG  --  1.8 1.8   PHOS  --  3.3 3.3       All pertinent labs within the past 24 hours have been reviewed.    Significant Imaging:   I have reviewed all pertinent imaging results/findings within the past 24 hours.    CT chest without contrast with very small bilateral effusions and LLL opacity without any air bronchograms, most consistent with atelectasis although pneumonia remains on ddx      Assessment/Plan:     Pulmonary  Pleural effusion,  left  Patient with small bilateral pleural effusions present at least since March 2023. Seen by pulm 1 week ago with no good window and no recommendation for thoracentesis. No evidence that this is empyema. Additionally, prior pulm team discussed risk/benefit with patient and they opted against invasive procedures. Discussed again with family and they stated that they would not want invasive procedures. Additionally, effusions are too small to tap.    Of note, patient with HFpEF and BNP now higher than previous admit - up to 263 in the setting of morbid obesity as well as florid volume overload on exam. Is 2L net + this admit. If not going in the direction of comfort care, recommend diuresis.     Palliative Care  Goals of care, counseling/discussion  Advance Care Planning   Discussed with daughter eli and granddaughter at bedside and they would prefer to proceed with comfort care measures only. Eli is going to call her two siblings today to come in for a family meeting. Discussed with primary team, they are aware            Thank you for your consult. I will sign off. Please contact us if you have any additional questions.       Jo Ann Barry MD  Pulmonary and Critical Care Fellow  06/03/2023  12:46 PM

## 2023-06-04 PROCEDURE — 99231 PR SUBSEQUENT HOSPITAL CARE,LEVL I: ICD-10-PCS | Mod: ,,, | Performed by: INTERNAL MEDICINE

## 2023-06-04 PROCEDURE — 20600001 HC STEP DOWN PRIVATE ROOM

## 2023-06-04 PROCEDURE — 99231 SBSQ HOSP IP/OBS SF/LOW 25: CPT | Mod: ,,, | Performed by: INTERNAL MEDICINE

## 2023-06-04 PROCEDURE — 63600175 PHARM REV CODE 636 W HCPCS: Performed by: INTERNAL MEDICINE

## 2023-06-04 RX ADMIN — MORPHINE SULFATE 2 MG: 2 INJECTION, SOLUTION INTRAMUSCULAR; INTRAVENOUS at 05:06

## 2023-06-04 NOTE — PLAN OF CARE
Patient has remained stable with no changes throughout the night. Family seems accepting of her transition process. She did have an episode where her heart rate went down in the 20's but recovered to the 50's, upon entering the room I found her coughing and she was very gurgly in her chest. Nothing was able to be suctioned. I did administer ativan to help relax her after the incident and there were no other issues after that. Her O2 sats remain in the 60's-70's and she remains on 10L O2 via NC. Urine output is decreasing. She put out 100mLs. She had a lot of family at the bedside at the beginning of the night but only the son is present currently. Her HR is currently in the 60's and her QRS complex is widening. She is completely unresponsive for the most part and doesn't react to pain.

## 2023-06-04 NOTE — PLAN OF CARE
SW sent patient information to [facility] via careport to contact family with hospice information.       06/04/23 0148   Post-Acute Status   Post-Acute Authorization Hospice   Hospice Status Referrals Sent

## 2023-06-04 NOTE — PROGRESS NOTES
[facility] - Intensive [facility] ([address] Medicine  Progress Note    Patient Name: Sherin Ortiz  MRN: [id number]  Patient Class: IP- Inpatient   Admission Date: 6/2/2023  Length of Stay: 2 days  Attending Physician: Melissa Alcantar MD  Primary Care Provider: Darby Baum MD        Subjective:     Principal Problem:Septic shock        HPI:  78 y.o. female w/ h/o stroke, HFpEF (60%), PHTN, CKD 4, DM2, HTN, HLD; p/w AMS.  Hx taken mostly from son & daughter.  [facility] w/ GCS 7; last known normal was at 07:30 today by nursing staff.  Pt was found nonverbal at 09:50 w/ shallow respirations.  Stroke code was activated in ED w/ initial head scans u/r; noted that pt isn't a candidate for CTA; eval'd to unlikely be a stroke per Stroke team.  Pt also hypothermic & in AF on arrival.  On arrival to ED, pt remains nonverbal & unable to provide Hx, tho pt opens eyes to verbal stimuli & withdraws to painful stimuli.  Pt mentation improved s/p passive rewarming w/ bear hugger.    Notably, pt was recently hospitalized 05/26-05/31 for dysphagia, found to have large loculated L pleural effusion, & found to be hypothermic, pancytopenic, & hyperkalemic; improved s/p empiric Abx. Pt was also hospitalized 04/28-05/05 for SOB & weakness, found to have LLL CAP; improved s/p Abx.    ED course: T 90.1 F, HR 60, /80, 99% ORA.  Labs u/r except for WBC 1.85, Hb 10.2 (b/l 9-10), Plt 90, Cr 2.1 (b/l 2.0-2.1), , TSH 5.769.  UA u/r.  CXR w/ opacification of L lung base likely representing combo of pleural effusion w/ atelectasis/consolidation; similar patchy airspace & interstitial type opacities at R lung base.  CTH w/o definite acute ICH, tho limited 2/2 motion artifact.  Given IVF 2 L, started on vanc/pip-tazo.      Overview/Hospital Course:  Admitted for severe sepsis a/w encephalopathy, unclear source, tho possible respiratory in s/o ongoing BL pleural effusions, L > R.  Pulm consulted, low  c/f empyema & not recommending thoracentesis 2/2 risk outweighing benfits. Discussed with family poor prognosis given multiple co-morbidities, family electing for comfort care per patient's wishes from prior hospitalizations.       Interval History: Comfort care. Social work to assist with in-patient hospice to allow family to have a more peaceful transition if she meets criteria.    Review of Systems  Objective:     Vital Signs (Most Recent):  Temp: 98.7 °F (37.1 °C) (06/03/23 0730)  Pulse: 78 (06/03/23 0841)  Resp: (!) 22 (06/03/23 0841)  BP: (!) 95/54 (06/03/23 0730)  SpO2: (!) 92 % (06/03/23 0841) Vital Signs (24h Range):  Temp:  [88.9 °F (31.6 °C)-98.7 °F (37.1 °C)] 98.7 °F (37.1 °C)  Pulse:  [53-78] 78  Resp:  [13-26] 22  SpO2:  [92 %-100 %] 92 %  BP: ()/(50-62) 95/54     Weight: 118.8 kg (262 lb)  Body mass index is 41.04 kg/m².    Intake/Output Summary (Last 24 hours) at 6/3/2023 1126  Last data filed at 6/2/2023 1730  Gross per 24 hour   Intake 2249 ml   Output --   Net 2249 ml         Physical Exam  Vitals and nursing note reviewed.   Constitutional:       General: She is not in acute distress.     Appearance: She is obese. She is ill-appearing (chronically). She is not diaphoretic.   HENT:      Head: Normocephalic and atraumatic.      Mouth/Throat:      Comments: Poor dentition  Pulmonary:      Comments: No tachypnea. Occasional coughing.   Abdominal:      General: Abdomen is flat. There is no distension.   Musculoskeletal:      Right lower leg: Edema present.      Left lower leg: Edema present.           Significant Labs: All pertinent labs within the past 24 hours have been reviewed.    Significant Imaging: I have reviewed and interpreted all pertinent imaging results/findings within the past 24 hours.      Assessment/Plan:      * Septic shock  Pleural effusion bilateral.    - Comfort measures  - Risk of thoracentesis outweighs long-term benefits     Pancytopenia  - monitor  - CBC q.d.     (HFpEF)  heart failure with preserved ejection fraction  Bilateral leg edema    Comfort measures    PAOD (peripheral arterial occlusive disease)  Hx of transient ischemic attack (TIA)    - monitor  - home ASA     Type 2 diabetes mellitus with diabetic polyneuropathy, with long-term current use of insulin  - comfort measures      Essential hypertension  - hold antiHTVs in s/o sepsis; restart as clinically indicated       VTE Risk Mitigation (From admission, onward)         Ordered     IP VTE HIGH RISK PATIENT  Once         06/02/23 1424                Discharge Planning   GABE:      Code Status: DNR   Is the patient medically ready for discharge?: No    Reason for patient still in hospital (select all that apply): Pending disposition  Discharge Plan A: Comfort care/withdrawal                  Krystyna Neil MD  Department of Hospital Medicine   [facility] - Intensive Care ([place])

## 2023-06-04 NOTE — CARE UPDATE
RAPID RESPONSE NURSE ROUND       Rounding completed with charge RN, Domenic. No additional concerns verbalized at this time. Patient on comfort measures.  Instructed to call [phone] for further concerns or assistance.

## 2023-06-04 NOTE — NURSING
End of Shift Summary: Pt remains on comfort care with family at bedside. Morphine 2mg given x1. No apparent distress noted.

## 2023-06-05 PROCEDURE — 63600175 PHARM REV CODE 636 W HCPCS: Performed by: INTERNAL MEDICINE

## 2023-06-05 PROCEDURE — 20600001 HC STEP DOWN PRIVATE ROOM

## 2023-06-05 PROCEDURE — 99231 SBSQ HOSP IP/OBS SF/LOW 25: CPT | Mod: GC,,, | Performed by: INTERNAL MEDICINE

## 2023-06-05 PROCEDURE — 99231 PR SUBSEQUENT HOSPITAL CARE,LEVL I: ICD-10-PCS | Mod: GC,,, | Performed by: INTERNAL MEDICINE

## 2023-06-05 RX ADMIN — LORAZEPAM 1 MG: 2 INJECTION INTRAMUSCULAR; INTRAVENOUS at 07:06

## 2023-06-05 RX ADMIN — MORPHINE SULFATE 2 MG: 2 INJECTION, SOLUTION INTRAMUSCULAR; INTRAVENOUS at 05:06

## 2023-06-05 NOTE — PLAN OF CARE
Patient rested well overnight, maintain a comfortable environment.   Problem: Infection  Goal: Absence of Infection Signs and Symptoms  Outcome: Ongoing, Progressing     Problem: Adult Inpatient Plan of Care  Goal: Plan of Care Review  Outcome: Ongoing, Progressing  Goal: Patient-Specific Goal (Individualized)  Outcome: Ongoing, Progressing  Goal: Absence of Hospital-Acquired Illness or Injury  Outcome: Ongoing, Progressing  Goal: Optimal Comfort and Wellbeing  Outcome: Ongoing, Progressing  Goal: Readiness for Transition of Care  Outcome: Ongoing, Progressing     Problem: Bariatric Environmental Safety  Goal: Safety Maintained with Care  Outcome: Ongoing, Progressing     Problem: Diabetes Comorbidity  Goal: Blood Glucose Level Within Targeted Range  Outcome: Ongoing, Progressing     Problem: Adjustment to Illness (Sepsis/Septic Shock)  Goal: Optimal Coping  Outcome: Ongoing, Progressing     Problem: Bleeding (Sepsis/Septic Shock)  Goal: Absence of Bleeding  Outcome: Ongoing, Progressing     Problem: Glycemic Control Impaired (Sepsis/Septic Shock)  Goal: Blood Glucose Level Within Desired Range  Outcome: Ongoing, Progressing     Problem: Infection Progression (Sepsis/Septic Shock)  Goal: Absence of Infection Signs and Symptoms  Outcome: Ongoing, Progressing     Problem: Nutrition Impaired (Sepsis/Septic Shock)  Goal: Optimal Nutrition Intake  Outcome: Ongoing, Progressing     Problem: Fluid and Electrolyte Imbalance (Acute Kidney Injury/Impairment)  Goal: Fluid and Electrolyte Balance  Outcome: Ongoing, Progressing     Problem: Oral Intake Inadequate (Acute Kidney Injury/Impairment)  Goal: Optimal Nutrition Intake  Outcome: Ongoing, Progressing     Problem: Renal Function Impairment (Acute Kidney Injury/Impairment)  Goal: Effective Renal Function  Outcome: Ongoing, Progressing     Problem: Fluid Imbalance (Pneumonia)  Goal: Fluid Balance  Outcome: Ongoing, Progressing     Problem: Infection (Pneumonia)  Goal:  Resolution of Infection Signs and Symptoms  Outcome: Ongoing, Progressing     Problem: Respiratory Compromise (Pneumonia)  Goal: Effective Oxygenation and Ventilation  Outcome: Ongoing, Progressing     Problem: Impaired Wound Healing  Goal: Optimal Wound Healing  Outcome: Ongoing, Progressing     Problem: Skin Injury Risk Increased  Goal: Skin Health and Integrity  Outcome: Ongoing, Progressing     Problem: Palliative Care  Goal: Enhanced Quality of Life  Outcome: Ongoing, Progressing

## 2023-06-05 NOTE — PLAN OF CARE
SW met with patient family to discuss the dc plan  regard to hospice. Family will be speaking with Jensen hospice coordinator with [facility]. GERMANIA will continue to follow up.       Leelee Seals LMSW  [facility]   [id number]

## 2023-06-05 NOTE — PROGRESS NOTES
[facility] - Intensive Care ([address] Medicine  Progress Note    Patient Name: Sherin Ortiz  MRN: [id number]  Patient Class: IP- Inpatient   Admission Date: 6/2/2023  Length of Stay: 3 days  Attending Physician: Melissa Alcantar MD  Primary Care Provider: Darby Baum MD        Subjective:     Principal Problem:Septic shock        HPI:  78 y.o. female w/ h/o stroke, HFpEF (60%), PHTN, CKD 4, DM2, HTN, HLD; p/w AMS.  Hx taken mostly from son & daughter.  Pt Px from  w/ GCS 7; last known normal was at 07:30 today by nursing staff.  Pt was found nonverbal at 09:50 w/ shallow respirations.  Stroke code was activated in ED w/ initial head scans u/r; noted that pt isn't a candidate for CTA; eval'd to unlikely be a stroke per Stroke team.  Pt also hypothermic & in AF on arrival.  On arrival to ED, pt remains nonverbal & unable to provide Hx, tho pt opens eyes to verbal stimuli & withdraws to painful stimuli.  Pt mentation improved s/p passive rewarming w/ bear hugger.    Notably, pt was recently hospitalized 05/26-05/31 for dysphagia, found to have large loculated L pleural effusion, & found to be hypothermic, pancytopenic, & hyperkalemic; improved s/p empiric Abx. Pt was also hospitalized 04/28-05/05 for SOB & weakness, found to have LLL CAP; improved s/p Abx.    ED course: T 90.1 F, HR 60, /80, 99% ORA.  Labs u/r except for WBC 1.85, Hb 10.2 (b/l 9-10), Plt 90, Cr 2.1 (b/l 2.0-2.1), , TSH 5.769.  UA u/r.  CXR w/ opacification of L lung base likely representing combo of pleural effusion w/ atelectasis/consolidation; similar patchy airspace & interstitial type opacities at R lung base.  CTH w/o definite acute ICH, tho limited 2/2 motion artifact.  Given IVF 2 L, started on vanc/pip-tazo.      Overview/Hospital Course:  Admitted for severe sepsis a/w encephalopathy, unclear source, tho possible respiratory in s/o ongoing BL pleural effusions, L > R.  Pulm consulted, low  c/f empyema & not recommending thoracentesis 2/2 risk outweighing benfits. Discussed with family poor prognosis given multiple co-morbidities, family electing for comfort care per patient's wishes from prior hospitalizations. Working with CM regarding in/out of hospital hospice options.       Interval History: NAEON. Coordinating with CM regarding hospice options.     Review of Systems   Unable to perform ROS: Acuity of condition   Objective:     Vital Signs (Most Recent):  Temp: 97.4 °F (36.3 °C) (06/05/23 0720)  Pulse: (!) 56 (06/05/23 0720)  Resp: 17 (06/05/23 0720)  BP: 132/63 (06/05/23 0720)  SpO2: (!) 94 % (06/05/23 0720) Vital Signs (24h Range):  Temp:  [97.4 °F (36.3 °C)-97.8 °F (36.6 °C)] 97.4 °F (36.3 °C)  Pulse:  [56-60] 56  Resp:  [17-24] 17  SpO2:  [94 %-95 %] 94 %  BP: (129-132)/(59-63) 132/63     Weight: 118.8 kg (262 lb)  Body mass index is 41.04 kg/m².  No intake or output data in the 24 hours ending 06/05/23 1328      Physical Exam  Vitals and nursing note reviewed.   Constitutional:       General: She is not in acute distress.     Appearance: She is obese. She is ill-appearing (chronically). She is not diaphoretic.   HENT:      Head: Normocephalic and atraumatic.      Mouth/Throat:      Dentition: Dental caries present.   Pulmonary:      Effort: No respiratory distress.   Chest:      Chest wall: No tenderness.   Abdominal:      General: Abdomen is flat. There is no distension.      Tenderness: There is no abdominal tenderness.   Musculoskeletal:      Right lower leg: Edema present.      Left lower leg: Edema present.           Significant Labs: All pertinent labs within the past 24 hours have been reviewed.    Significant Imaging: I have reviewed all pertinent imaging results/findings within the past 24 hours.      Assessment/Plan:      * Septic shock  Pleural effusion bilateral.    - Comfort measures  - Risk of thoracentesis outweighs long-term benefits     Pancytopenia     (HFpEF) heart failure  with preserved ejection fraction  Bilateral leg edema    Comfort measures    PAOD (peripheral arterial occlusive disease)  Hx of transient ischemic attack (TIA)      Type 2 diabetes mellitus with diabetic polyneuropathy, with long-term current use of insulin  - comfort measures      Essential hypertension  - hold antiHTVs in s/o sepsis; restart as clinically indicated       VTE Risk Mitigation (From admission, onward)         Ordered     IP VTE HIGH RISK PATIENT  Once         06/02/23 1424                Discharge Planning   GABE: 6/6/2023     Code Status: DNR   Is the patient medically ready for discharge?: Yes    Reason for patient still in hospital (select all that apply): Pending disposition  Discharge Plan A: Comfort care/withdrawal                  Turner West, DO  Department of Hospital Medicine   [facility] - Intensive Care ([place]-)

## 2023-06-05 NOTE — NURSING
End of shift Summary: Pt remains on comfort care family at Bedside. Lorazepam given x1, Morphine given x1 this shift. No apparent distress noted.

## 2023-06-05 NOTE — SUBJECTIVE & OBJECTIVE
Interval History: NAEON. Coordinating with CM regarding hospice options.     Review of Systems   Unable to perform ROS: Acuity of condition   Objective:     Vital Signs (Most Recent):  Temp: 97.4 °F (36.3 °C) (06/05/23 0720)  Pulse: (!) 56 (06/05/23 0720)  Resp: 17 (06/05/23 0720)  BP: 132/63 (06/05/23 0720)  SpO2: (!) 94 % (06/05/23 0720) Vital Signs (24h Range):  Temp:  [97.4 °F (36.3 °C)-97.8 °F (36.6 °C)] 97.4 °F (36.3 °C)  Pulse:  [56-60] 56  Resp:  [17-24] 17  SpO2:  [94 %-95 %] 94 %  BP: (129-132)/(59-63) 132/63     Weight: 118.8 kg (262 lb)  Body mass index is 41.04 kg/m².  No intake or output data in the 24 hours ending 06/05/23 1328      Physical Exam  Vitals and nursing note reviewed.   Constitutional:       General: She is not in acute distress.     Appearance: She is obese. She is ill-appearing (chronically). She is not diaphoretic.   HENT:      Head: Normocephalic and atraumatic.      Mouth/Throat:      Dentition: Dental caries present.   Pulmonary:      Effort: No respiratory distress.   Chest:      Chest wall: No tenderness.   Abdominal:      General: Abdomen is flat. There is no distension.      Tenderness: There is no abdominal tenderness.   Musculoskeletal:      Right lower leg: Edema present.      Left lower leg: Edema present.           Significant Labs: All pertinent labs within the past 24 hours have been reviewed.    Significant Imaging: I have reviewed all pertinent imaging results/findings within the past 24 hours.

## 2023-06-06 VITALS
HEIGHT: 67 IN | SYSTOLIC BLOOD PRESSURE: 135 MMHG | RESPIRATION RATE: 18 BRPM | DIASTOLIC BLOOD PRESSURE: 61 MMHG | TEMPERATURE: 98 F | BODY MASS INDEX: 41.12 KG/M2 | WEIGHT: 262 LBS | OXYGEN SATURATION: 96 % | HEART RATE: 60 BPM

## 2023-06-06 PROCEDURE — 63600175 PHARM REV CODE 636 W HCPCS: Performed by: INTERNAL MEDICINE

## 2023-06-06 PROCEDURE — 1111F PR DISCHARGE MEDS RECONCILED W/ CURRENT OUTPATIENT MED LIST: ICD-10-PCS | Mod: CPTII,,, | Performed by: INTERNAL MEDICINE

## 2023-06-06 PROCEDURE — 99239 HOSP IP/OBS DSCHRG MGMT >30: CPT | Mod: ,,, | Performed by: INTERNAL MEDICINE

## 2023-06-06 PROCEDURE — 1111F DSCHRG MED/CURRENT MED MERGE: CPT | Mod: CPTII,,, | Performed by: INTERNAL MEDICINE

## 2023-06-06 PROCEDURE — 99239 PR HOSPITAL DISCHARGE DAY,>30 MIN: ICD-10-PCS | Mod: ,,, | Performed by: INTERNAL MEDICINE

## 2023-06-06 RX ORDER — ONDANSETRON 2 MG/ML
8 INJECTION INTRAMUSCULAR; INTRAVENOUS EVERY 8 HOURS PRN
Start: 2023-06-06

## 2023-06-06 RX ORDER — DEXTROSE 40 %
15 GEL (GRAM) ORAL
Start: 2023-06-06 | End: 2024-06-05

## 2023-06-06 RX ORDER — PROCHLORPERAZINE EDISYLATE 5 MG/ML
10 INJECTION INTRAMUSCULAR; INTRAVENOUS EVERY 6 HOURS PRN
Start: 2023-06-06 | End: 2023-06-16

## 2023-06-06 RX ORDER — TALC
6 POWDER (GRAM) TOPICAL NIGHTLY PRN
Refills: 0
Start: 2023-06-06

## 2023-06-06 RX ORDER — BISACODYL 10 MG
10 SUPPOSITORY, RECTAL RECTAL DAILY PRN
Refills: 0
Start: 2023-06-06

## 2023-06-06 RX ORDER — DEXTROSE 40 %
30 GEL (GRAM) ORAL
Start: 2023-06-06 | End: 2024-06-05

## 2023-06-06 RX ORDER — GLYCOPYRROLATE 0.2 MG/ML
0.1 INJECTION INTRAMUSCULAR; INTRAVENOUS 3 TIMES DAILY PRN
Start: 2023-06-06

## 2023-06-06 RX ADMIN — MORPHINE SULFATE 2 MG: 2 INJECTION, SOLUTION INTRAMUSCULAR; INTRAVENOUS at 08:06

## 2023-06-06 RX ADMIN — MORPHINE SULFATE 2 MG: 2 INJECTION, SOLUTION INTRAMUSCULAR; INTRAVENOUS at 04:06

## 2023-06-06 NOTE — PLAN OF CARE
[facility] - Intensive Care ([facility])  Discharge Final Note    Primary Care Provider: Darby Baum MD    Expected Discharge Date: 6/6/2023    Final Discharge Note (most recent)       Final Note - 06/06/23 1425          Final Note    Assessment Type Final Discharge Note (P)      Anticipated Discharge Disposition Hospice/Medical Facility (P)                      Important Message from Medicare           Patient is discharging home today with hospice through [facility].  has arranged transport for 4:30pm. DME equipment will be delivered and set up at the home at 3:30pm.       Leelee Seals LMSW  [facility]   [phone]

## 2023-06-06 NOTE — ASSESSMENT & PLAN NOTE
Pleural effusion bilateral.    - Comfort measures  - Risk of thoracentesis outweighs long-term benefits   - Patient to transition to home hospice

## 2023-06-06 NOTE — DISCHARGE SUMMARY
[facility] - Intensive Care ([place] [address])  [facility] Medicine  Discharge Summary      Patient Name: Sherin Ortiz  MRN: [id number]  SHAHNAZ: [id number]  Patient Class: IP- Inpatient  Admission Date: 6/2/2023  Hospital Length of Stay: 4 days  Discharge Date and Time:  06/06/2023 1:59 PM  Attending Physician: Melissa Alcantar MD   Discharging Provider: Art Mandujano DO  Primary Care Provider: Darby Baum MD  [facility] Medicine Team: [address]  Primary Care Team: [facility] 4    HPI:   78 y.o. female w/ h/o stroke, HFpEF (60%), PHTN, CKD 4, DM2, HTN, HLD; p/w AMS.  Hx taken mostly from son & daughter.  Pt Px from [facility] w/ GCS 7; last known normal was at 07:30 today by nursing staff.  Pt was found nonverbal at 09:50 w/ shallow respirations.  Stroke code was activated in ED w/ initial head scans u/r; noted that pt isn't a candidate for CTA; eval'd to unlikely be a stroke per Stroke team.  Pt also hypothermic & in AF on arrival.  On arrival to ED, pt remains nonverbal & unable to provide Hx, tho pt opens eyes to verbal stimuli & withdraws to painful stimuli.  Pt mentation improved s/p passive rewarming w/ bear hugger.    Notably, pt was recently hospitalized 05/26-05/31 for dysphagia, found to have large loculated L pleural effusion, & found to be hypothermic, pancytopenic, & hyperkalemic; improved s/p empiric Abx. Pt was also hospitalized 04/28-05/05 for SOB & weakness, found to have LLL CAP; improved s/p Abx.    ED course: T 90.1 F, HR 60, /80, 99% ORA.  Labs u/r except for WBC 1.85, Hb 10.2 (b/l 9-10), Plt 90, Cr 2.1 (b/l 2.0-2.1), , TSH 5.769.  UA u/r.  CXR w/ opacification of L lung base likely representing combo of pleural effusion w/ atelectasis/consolidation; similar patchy airspace & interstitial type opacities at R lung base.  CTH w/o definite acute ICH, tho limited 2/2 motion artifact.  Given IVF 2 L, started on vanc/pip-tazo.      * No surgery found *       Hospital Course:   Admitted for severe sepsis a/w encephalopathy, unclear source, tho possible respiratory in s/o ongoing BL pleural effusions, L > R.  Pulm consulted, low c/f empyema & not recommending thoracentesis 2/2 risk outweighing benfits. Discussed with family poor prognosis given multiple co-morbidities, family electing for comfort care per patient's wishes from prior hospitalizations. Patient to transition to home hospice.      Interval History: Patient resting comfortably in bed with family at bedside. No concerns expressed by family at time of bedside eval.Will discharge to home hospice.      Review of Systems   Unable to perform ROS: Acuity of condition   Objective:      Vital Signs (Most Recent):  Temp: 98.1 °F (36.7 °C) (06/06/23 0730)  Pulse: 60 (06/06/23 0730)  Resp: 16 (06/06/23 0730)  BP: 135/61 (06/06/23 0730)  SpO2: 96 % (06/06/23 0730) Vital Signs (24h Range):  Temp:  [97.5 °F (36.4 °C)-98.1 °F (36.7 °C)] 98.1 °F (36.7 °C)  Pulse:  [58-61] 60  Resp:  [16-24] 16  SpO2:  [87 %-96 %] 96 %  BP: (133-136)/(61-64) 135/61      Weight: 118.8 kg (262 lb)  Body mass index is 41.04 kg/m².     Intake/Output Summary (Last 24 hours) at 6/6/2023 0753  Last data filed at 6/6/2023 0024      Gross per 24 hour   Intake --   Output 200 ml   Net -200 ml         Physical Exam  Vitals and nursing note reviewed.   Constitutional:       General: She is not in acute distress.     Appearance: She is obese. She is ill-appearing (chronically). She is not diaphoretic.   HENT:      Head: Normocephalic and atraumatic.      Mouth/Throat:      Dentition: Dental caries present.   Pulmonary:      Effort: No respiratory distress.   Chest:      Chest wall: No tenderness.   Abdominal:      General: Abdomen is flat. There is no distension.      Tenderness: There is no abdominal tenderness.   Musculoskeletal:      Right lower leg: Edema present.      Left lower leg: Edema present.             Significant Labs: All pertinent labs  within the past 24 hours have been reviewed.     Significant Imaging: I have reviewed all pertinent imaging results/findings within the past 24 hours.    Goals of Care Treatment Preferences:  Code Status: DNR          What is most important right now is to focus on quality of life, even if it means sacrificing a little time.  Accordingly, we have decided that the best plan to meet the patient's goals includes continuing with treatment.      Consults:   Consults (From admission, onward)          Status Ordering Provider     Inpatient consult to Social Work  Once        Provider:  (Not yet assigned)    Acknowledged GOVIND MORATAYA     Consult to Spiritual Care  Once        Provider:  (Not yet assigned)    Completed GOVIND MORATAYA     Inpatient consult to Pulmonology  Once        Provider:  (Not yet assigned)    Completed GOVIND MORATAYA     Inpatient consult to Vascular (Stroke) Neurology  Once        Provider:  (Not yet assigned)    Completed LEONIE SPANGLER            Cardiac/Vascular  (HFpEF) heart failure with preserved ejection fraction  Bilateral leg edema    Comfort measures    PAOD (peripheral arterial occlusive disease)  Hx of transient ischemic attack (TIA)      Essential hypertension  Comfort care       ID  * Septic shock  Pleural effusion bilateral.    - Comfort measures  - Risk of thoracentesis outweighs long-term benefits   - Patient to transition to home hospice     Endocrine  Type 2 diabetes mellitus with diabetic polyneuropathy, with long-term current use of insulin  - comfort measures        Final Active Diagnoses:    Diagnosis Date Noted POA    PRINCIPAL PROBLEM:  Septic shock [A41.9, R65.21] 11/29/2015 Yes    Comfort measures only status [Z51.5] 06/03/2023 Not Applicable    Altered mental status [R41.82] 06/02/2023 Yes    Pleural effusion, left [J90] 05/26/2023 Yes    Pancytopenia [D61.818] 05/26/2023 Yes    Goals of care, counseling/discussion [Z71.89] 03/23/2019 Not Applicable    Hx of transient  ischemic attack (TIA) [Z86.73] 01/22/2018 Not Applicable     Chronic    (HFpEF) heart failure with preserved ejection fraction [I50.30] 01/22/2018 Yes     Chronic    PAOD (peripheral arterial occlusive disease) [I77.9] 07/06/2017 Yes     Chronic    Type 2 diabetes mellitus with diabetic polyneuropathy, with long-term current use of insulin [E11.42, Z79.4] 09/18/2015 Not Applicable     Chronic    Stage 4 chronic kidney disease [N18.4] 09/12/2013 Yes     Chronic    Essential hypertension [I10] 11/14/2012 Yes     Chronic      Problems Resolved During this Admission:    Diagnosis Date Noted Date Resolved POA    Debility [R53.81] 12/19/2012 06/04/2023 Yes     Chronic    Hyperlipidemia LDL goal <100 [E78.5] 08/14/2012 06/04/2023 Yes     Chronic    Bilateral leg edema [R60.0] 07/24/2012 06/04/2023 Yes     Chronic       Discharged Condition: stable    Disposition:     Follow Up:    Patient Instructions:   No discharge procedures on file.    Significant Diagnostic Studies: N/A    Pending Diagnostic Studies:       None           Medications:  Reconciled Home Medications:      Medication List        START taking these medications      bisacodyL 10 mg Supp  Commonly known as: DULCOLAX  Place 1 suppository (10 mg total) rectally daily as needed (until bowel movement if patient has not had a bowel movement for 2 days).     * dextrose 40 % gel  Commonly known as: GLUTOSE  Take 75 mLs (30,000 mg total) by mouth as needed (For blood glucose less than, 50 mg/dL X 1 dose for patients who can take PO.).     * dextrose 40 % gel  Commonly known as: GLUTOSE  Take 37.5 mLs (15,000 mg total) by mouth as needed (For blood glucose, 50-70 mg/dL X 1 dose for patients who can take PO.).     glycopyrrolate 0.2 mg/mL injection  Commonly known as: ROBINUL  Inject 0.5 mLs (0.1 mg total) into the vein 3 (three) times daily as needed (secretions).     melatonin 3 mg tablet  Commonly known as: MELATIN  Take 2 tablets (6 mg total) by mouth nightly as  needed for Insomnia.     ondansetron 4 mg/2 mL Soln  Inject 8 mg into the vein every 8 (eight) hours as needed.     prochlorperazine 10 mg/2 mL (5 mg/mL) Soln injection  Commonly known as: COMPAZINE  Inject 2 mLs (10 mg total) into the vein every 6 (six) hours as needed.           * This list has 2 medication(s) that are the same as other medications prescribed for you. Read the directions carefully, and ask your doctor or other care provider to review them with you.                STOP taking these medications      amLODIPine 10 MG tablet  Commonly known as: NORVASC     ammonium lactate 12 % lotion  Commonly known as: LAC-HYDRIN     atorvastatin 40 MG tablet  Commonly known as: LIPITOR     SHANNAN CHEWABLE ASPIRIN 81 MG Chew  Generic drug: aspirin     blood sugar diagnostic Strp  Commonly known as: TRUE METRIX GLUCOSE TEST STRIP     diclofenac sodium 1 % Gel  Commonly known as: VOLTAREN     furosemide 40 MG tablet  Commonly known as: LASIX     insulin glargine 100 unit/mL injection  Commonly known as: LANTUS U-100 INSULIN     lancets 33 gauge Misc  Commonly known as: TRUEPLUS LANCETS     lisinopriL 5 MG tablet  Commonly known as: PRINIVIL,ZESTRIL     metroNIDAZOLE 500 MG tablet  Commonly known as: FLAGYL     pantoprazole 40 MG tablet  Commonly known as: PROTONIX     sodium bicarbonate 650 MG tablet              Indwelling Lines/Drains at time of discharge:   Lines/Drains/Airways       Drain  Duration                  Urethral Catheter 06/02/23 1945 Temperature probe 16 Fr. 3 days                    Time spent on the discharge of patient: 35 minutes         [facility]  [facility]

## 2023-06-06 NOTE — SUBJECTIVE & OBJECTIVE
Interval History: Patient resting comfortably in bed with family at bedside. No concerns expressed by family at time of bedside eval. Coordinating with CM.      Review of Systems   Unable to perform ROS: Acuity of condition   Objective:     Vital Signs (Most Recent):  Temp: 98.1 °F (36.7 °C) (06/06/23 0730)  Pulse: 60 (06/06/23 0730)  Resp: 16 (06/06/23 0730)  BP: 135/61 (06/06/23 0730)  SpO2: 96 % (06/06/23 0730) Vital Signs (24h Range):  Temp:  [97.5 °F (36.4 °C)-98.1 °F (36.7 °C)] 98.1 °F (36.7 °C)  Pulse:  [58-61] 60  Resp:  [16-24] 16  SpO2:  [87 %-96 %] 96 %  BP: (133-136)/(61-64) 135/61     Weight: 118.8 kg (262 lb)  Body mass index is 41.04 kg/m².    Intake/Output Summary (Last 24 hours) at 6/6/2023 0753  Last data filed at 6/6/2023 0024  Gross per 24 hour   Intake --   Output 200 ml   Net -200 ml         Physical Exam  Vitals and nursing note reviewed.   Constitutional:       General: She is not in acute distress.     Appearance: She is obese. She is ill-appearing (chronically). She is not diaphoretic.   HENT:      Head: Normocephalic and atraumatic.      Mouth/Throat:      Dentition: Dental caries present.   Pulmonary:      Effort: No respiratory distress.   Chest:      Chest wall: No tenderness.   Abdominal:      General: Abdomen is flat. There is no distension.      Tenderness: There is no abdominal tenderness.   Musculoskeletal:      Right lower leg: Edema present.      Left lower leg: Edema present.           Significant Labs: All pertinent labs within the past 24 hours have been reviewed.    Significant Imaging: I have reviewed all pertinent imaging results/findings within the past 24 hours.

## 2023-06-06 NOTE — PLAN OF CARE
Patient has remained stable throughout the night. She was a little more alert throughout the night. She is able to nod and answer questions. She has denied pain, distress, or discomfort. Family is present at the bedside.

## 2023-06-06 NOTE — PLAN OF CARE
"[facility]  Department of Hospital Medicine  [address] [place] [address]  [phone] after hours  [phone] fax    HOSPICE  ORDERS    06/06/2023    Admit to Hospice:  Home Service     Diagnoses:   Active Hospital Problems    Diagnosis  POA    *Septic shock [A41.9, R65.21]  Yes    Comfort measures only status [Z51.5]  Not Applicable    Altered mental status [R41.82]  Yes    Pleural effusion, left [J90]  Yes    Pancytopenia [D61.818]  Yes    Goals of care, counseling/discussion [Z71.89]  Not Applicable    Hx of transient ischemic attack (TIA) [Z86.73]  Not Applicable     Chronic    (HFpEF) heart failure with preserved ejection fraction [I50.30]  Yes     Chronic    PAOD (peripheral arterial occlusive disease) [I77.9]  Yes     Chronic     Location in Record and Date:   VAS DISHA-9/18/2015    "Rt DISHA (1.12) Segment/Brachial Index and PVR wavef  orms indicate minimal peripheral arterial obstructive disease.  Lt DISHA (1.09) Segment/Brachial Index and PVR waveforms indicate minimal peripheral arterial obstructive disease."  Other Chronic Conditions:  HLD, DM    Medications:  Aspirin 81 mg, Lipi  tor 40 mg      Type 2 diabetes mellitus with diabetic polyneuropathy, with long-term current use of insulin [E11.42, Z79.4]  Not Applicable     Chronic    Stage 4 chronic kidney disease [N18.4]  Yes     Chronic    Essential hypertension [I10]  Yes     Chronic      Resolved Hospital Problems    Diagnosis Date Resolved POA    Debility [R53.81] 06/04/2023 Yes     Chronic    Hyperlipidemia LDL goal <100 [E78.5] 06/04/2023 Yes     Chronic    Bilateral leg edema [R60.0] 06/04/2023 Yes     Chronic       Hospice Qualifying Diagnoses:        Patient has a life expectancy < 6 months due to:  Primary Hospice Diagnosis: Sepsis   Comorbid Conditions Contributing to Decline:  CKD4, HFpEF, pancytopenia, pleural effusion    Vital Signs: Routine per Hospice Protocol.    Code Status: " DNR    Allergies:   Review of patient's allergies indicates:   Allergen Reactions    Penicillins Hives     Other reaction(s): Hives  Patient has received cefdinir, ceftriaxone, cefazolin and cefepime in the past with no documented reactions    Sulfa (sulfonamide antibiotics) Other (See Comments)     Eyad, pt states her doctor told her the shakes were possibly caused by an allergy to sulfa       Diet: Dysphagia Pureed     Activities: As tolerated    Goals of Care Treatment Preferences:  Code Status: DNR          What is most important right now is to focus on quality of life, even if it means sacrificing a little time.  Accordingly, we have decided that the best plan to meet the patient's goals includes continuing with treatment.      Nursing: Per Hospice Routine.     Sanchez Care: Empty Sanchez bag Q shift and PRN.  Change Sanchez every month.    Routine Skin for Bedridden Patients: Apply moisture barrier cream to all skin folds and   wet areas in perineal area daily and after baths and all bowel movements.   Cover with gauze dressing      Medications:   Patient has required 1x dose of 2mg morphine daily        Medication List        START taking these medications      bisacodyL 10 mg Supp  Commonly known as: DULCOLAX  Place 1 suppository (10 mg total) rectally daily as needed (until bowel movement if patient has not had a bowel movement for 2 days).     * dextrose 40 % gel  Commonly known as: GLUTOSE  Take 75 mLs (30,000 mg total) by mouth as needed (For blood glucose less than, 50 mg/dL X 1 dose for patients who can take PO.).     * dextrose 40 % gel  Commonly known as: GLUTOSE  Take 37.5 mLs (15,000 mg total) by mouth as needed (For blood glucose, 50-70 mg/dL X 1 dose for patients who can take PO.).     glycopyrrolate 0.2 mg/mL injection  Commonly known as: ROBINUL  Inject 0.5 mLs (0.1 mg total) into the vein 3 (three) times daily as needed (secretions).     melatonin 3 mg tablet  Commonly known as: MELATIN  Take 2  tablets (6 mg total) by mouth nightly as needed for Insomnia.     ondansetron 4 mg/2 mL Soln  Inject 8 mg into the vein every 8 (eight) hours as needed.     prochlorperazine 10 mg/2 mL (5 mg/mL) Soln injection  Commonly known as: COMPAZINE  Inject 2 mLs (10 mg total) into the vein every 6 (six) hours as needed.           * This list has 2 medication(s) that are the same as other medications prescribed for you. Read the directions carefully, and ask your doctor or other care provider to review them with you.                STOP taking these medications      amLODIPine 10 MG tablet  Commonly known as: NORVASC     ammonium lactate 12 % lotion  Commonly known as: LAC-HYDRIN     atorvastatin 40 MG tablet  Commonly known as: LIPITOR     SHANNAN CHEWABLE ASPIRIN 81 MG Chew  Generic drug: aspirin     blood sugar diagnostic Strp  Commonly known as: TRUE METRIX GLUCOSE TEST STRIP     diclofenac sodium 1 % Gel  Commonly known as: VOLTAREN     furosemide 40 MG tablet  Commonly known as: LASIX     insulin glargine 100 unit/mL injection  Commonly known as: LANTUS U-100 INSULIN     lancets 33 gauge Misc  Commonly known as: TRUEPLUS LANCETS     lisinopriL 5 MG tablet  Commonly known as: PRINIVIL,ZESTRIL     metroNIDAZOLE 500 MG tablet  Commonly known as: FLAGYL     pantoprazole 40 MG tablet  Commonly known as: PROTONIX     sodium bicarbonate 650 MG tablet                DIABETES CARE:   Nurse to perform and educate diabetic management with blood glucose monitoring:       Fingerstick blood sugar a.m. and p.m.     Fingerstick blood sugar AC and HS     Fingerstick blood sugar every 6 hours if patient is unable to eat    Report CBG < 60 or > 350 to physician.         Insulin Sliding Scale         Glucose  Novolog Insulin Subcutaneous        0 - 60   Orange juice or glucose tablet      No insulin   201-250  2 units   251-300  4 units   301-350  6 units   351-400  8 units   >400   10 units then call physician      Future Orders:   Hospice Medical Director may dictate new orders for comfortable care measures & sign death certificate.        _________________________________  Art Mandujano DO  06/06/2023

## 2023-06-07 ENCOUNTER — PATIENT OUTREACH (OUTPATIENT)
Dept: ADMINISTRATIVE | Facility: CLINIC | Age: 78
End: 2023-06-07
Payer: MEDICARE

## 2023-06-07 LAB
BACTERIA BLD CULT: NORMAL
BACTERIA BLD CULT: NORMAL

## 2023-06-07 NOTE — PHYSICIAN QUERY
PT Name: Sherin Ortiz  MR #: [id number]     DOCUMENTATION CLARIFICATION     CDS/: Mindi De Anda RN          Contact Information: [email or web address]    This form is a permanent document in the medical record.     Query Date: June 7, 2023    By submitting this query, we are merely seeking further clarification of documentation.  Please utilize your independent clinical judgment when addressing the question(s) below.  The Medical Record contains the following:  Indicators Supporting Clinical Findings Location in Medical Record   x HR         RR          BP        Temp Temp:  [88.9 °F (31.6 °C)-92.7 °F (33.7 °C)] 92.7 °F (33.7 °C)  Pulse:  [53-64] 64  Resp:  [13-20] 17  BP: ()/(50-80) 124/58    Temp:  [88.9 °F (31.6 °C)-98.7 °F (37.1 °C)] 98.7 °F (37.1 °C)  Pulse:  [53-78] 78  Resp:  [13-26] 22  BP: ()/(50-62) 95/54    Temp:  [88.9 °F (31.6 °C)-98.7 °F (37.1 °C)] 98.7 °F (37.1 °C)  Pulse:  [53-78] 78  Resp:  [13-26] 22  BP: ()/(50-62) 95/54 6/2/2023 Vitals          6/3/2023 Vitals          6/4/2023 Vitals   x Lactic Acid          Procalcitonin  06/02/23 1610   LACTATE 1.0      06/02/23 16:10   Procalcitonin 0.06    Lab results    x WBC           Bands          CRP     06/02/23 11:09 06/03/23 05:58   WBC 1.85 (LL) 2.70 (L)    Lab results     Culture(s)     x AMS, Confusion, LOC, etc. AMS, encephalopathy    Alert but nonverbal & not at b/l mentation 6/2/2023 H&P    6/3/2023 HM PN    x Organ Dysfunction/Failure Acute kidney injury, Encephalopathy and Thrombocytopenia     x Bacteremia or Sepsis / Septic Severe sepsis    Septic shock    Admitted for severe sepsis a/w encephalopathy, unclear source  Septic shock 6/2/2023 H&P-6/3/2023 HM PN     6/4/2023 HM PN     6/6/2023 Discharge Summary   x Known or Suspected Source of Infection documented Left pleural effusion    Pleural effusion bilateral 6/2/2023 H&P    6/4/2023 HM PN     (Failed) Outpatient Treatment     x Medication vancomycin 2 g  IVPB x1  azithromycin (ZITHROMAX) 500 mg IVPB q24 hrs  piperacillin-tazobactam (ZOSYN) 4.5 g IVPB q8 hrs 6/2/2023 medications  6/2/2023-6/3/2023 medications   x Treatment lactated ringers bolus 1,000 mL IV x2    After family meeting 6/3, family decided to proceed with comfort driven care.     Comfort care 6/2/2023 medications    6/3/2023 HM PN       6/4/2023 HM PN    x Other previously hospitalized x2 over last mos also for sepsis 2/2 CAP w/ L pleural effusion, improved w/ IV Abx.  Unclear if source control was truly achieved previously in s/o no thoracentesis performed 2/2 risks outweighing benefits. 6/2/2023 H&P        Provider, please clarify the Severe sepsis diagnosis or diagnoses associated with above clinical findings.    [   ] Severe Sepsis with Acute Organ Dysfunction/Failure   [ X ] Sepsis with Septic Shock   [   ] Other Infectious Disease (please specify): __________   [  ] Clinically Undetermined         Please document in your progress notes daily for the duration of treatment until resolved and include in your discharge summary.

## 2023-07-28 ENCOUNTER — PATIENT MESSAGE (OUTPATIENT)
Dept: CARDIOLOGY | Facility: CLINIC | Age: 78
End: 2023-07-28
Payer: MEDICARE

## 2023-07-31 PROBLEM — J18.9 PNEUMONIA DUE TO INFECTIOUS ORGANISM: Status: RESOLVED | Noted: 2023-04-30 | Resolved: 2023-07-31

## 2023-09-21 ENCOUNTER — PATIENT MESSAGE (OUTPATIENT)
Dept: ADMINISTRATIVE | Facility: HOSPITAL | Age: 78
End: 2023-09-21
Payer: MEDICARE

## 2023-10-12 ENCOUNTER — PATIENT MESSAGE (OUTPATIENT)
Dept: RESPIRATORY THERAPY | Facility: HOSPITAL | Age: 78
End: 2023-10-12
Payer: MEDICARE

## 2023-12-21 ENCOUNTER — PATIENT MESSAGE (OUTPATIENT)
Dept: ADMINISTRATIVE | Facility: HOSPITAL | Age: 78
End: 2023-12-21
Payer: MEDICARE

## 2024-03-21 ENCOUNTER — PATIENT MESSAGE (OUTPATIENT)
Dept: ADMINISTRATIVE | Facility: HOSPITAL | Age: 79
End: 2024-03-21
Payer: MEDICARE

## 2024-05-01 NOTE — TELEPHONE ENCOUNTER
----- Message from Palma Jackson sent at 12/23/2022  3:29 PM CST -----  Regarding: Patient advice  Contact: Maribel López called in regard to getting clarification on dosage for medication    insulin detemir U-100 (LEVEMIR) 100 unit/mL injection 10 mL 4 12/15/2022 12/15/2023 No  Sig - Route: Inject 10-15 Units into the skin every evening. - Subcutaneous  Sent to pharmacy as: insulin detemir U-100 (LEVEMIR) 100 unit/mL injection  Class: Normal  Order: [id number]  Date/Time Signed: 12/15/2022 16:20      E-Prescribing Status: Receipt confirmed by pharmacy (12/15/2022  4:20 PM CST)    Pharmacy can be reached at [phone]    
Called Pharmacy    Clarified levemir rx with veronica   
Fax was sent 12/23     Pen or vial not specified both in epic and on original rx     Please advise   
Pharmacy calling to verify if it is the pen or the vial?  
No

## 2024-06-06 ENCOUNTER — TELEPHONE (OUTPATIENT)
Dept: PRIMARY CARE CLINIC | Facility: CLINIC | Age: 79
End: 2024-06-06
Payer: MEDICARE

## 2024-06-06 NOTE — TELEPHONE ENCOUNTER
CALLED PT DAUGHTER. PT DAUGHTER STATED THE PT IS .      *PT HAS BEEN  SINCE LAST YEAR.       *PLEASE ADVISE.

## 2025-03-19 NOTE — PROGRESS NOTES
[facility]  Gastroenterology  Progress Note    Patient Name: Sherin Ortiz  MRN: [id number]  Admission Date: 7/12/2022  Hospital Length of Stay: 1 days  Code Status: Prior   Attending Provider: Su Maddox*  Consulting Provider: J Carlos Hurst MD  Primary Care Physician: Deedee Calderon MD  Principal Problem: GIB (gastrointestinal bleeding)      Subjective:     Interval History:   No acute events overnight.  Video capsule reviewed today:  Suggestive of bleeding and proximal small bowel.  Patient denies further melena.  Denies significant abdominal pain.  Hemoglobin stable    Review of Systems   Constitutional:  Positive for fatigue. Negative for chills and fever.   Respiratory:  Negative for choking and chest tightness.    Gastrointestinal:  Negative for nausea and vomiting.   Neurological:  Negative for dizziness and headaches.   Psychiatric/Behavioral:  Negative for agitation and behavioral problems.    Objective:     Vital Signs (Most Recent):  Temp: 97.6 °F (36.4 °C) (07/16/22 1132)  Pulse: 62 (07/16/22 1132)  Resp: 16 (07/16/22 1132)  BP: (!) 168/68 (07/16/22 1400)  SpO2: 99 % (07/16/22 1132)   Vital Signs (24h Range):  Temp:  [96.2 °F (35.7 °C)-98.3 °F (36.8 °C)] 97.6 °F (36.4 °C)  Pulse:  [55-68] 62  Resp:  [16-20] 16  SpO2:  [94 %-99 %] 99 %  BP: (134-183)/(63-89) 168/68     Weight: 126 kg (277 lb 12.5 oz) (07/15/22 0427)  Body mass index is 43.51 kg/m².      Intake/Output Summary (Last 24 hours) at 7/16/2022 1449  Last data filed at 7/16/2022 1400  Gross per 24 hour   Intake 5419.58 ml   Output 1450 ml   Net 3969.58 ml         Lines/Drains/Airways       Airway  Duration                  Airway - Non-Surgical 07/14/22 1501 Nasal Cannula 1 day              Peripheral Intravenous Line  Duration                  Peripheral IV - Single Lumen 07/12/22 2051 20 G Right Forearm 3 days         Peripheral IV - Single Lumen 07/15/22 1308 20 G Anterior;Left;Proximal Forearm 1 day                     Physical Exam  Vitals reviewed.   Constitutional:       General: She is not in acute distress.  HENT:      Head: Normocephalic and atraumatic.   Eyes:      General: No scleral icterus.     Conjunctiva/sclera: Conjunctivae normal.   Skin:     General: Skin is warm.      Coloration: Skin is pale.      Findings: No rash.   Neurological:      Mental Status: She is oriented to person, place, and time.      Gait: Gait normal.   Psychiatric:         Mood and Affect: Mood normal.         Behavior: Behavior normal.       Significant Labs:  CBC:   Recent Labs   Lab 07/15/22  0537 07/15/22  1926 07/16/22  0556   WBC 3.47* 8.87 4.03   HGB 6.7* 7.9* 7.6*   HCT 21.0* 25.9* 24.3*    232 199           Significant Imaging:  Imaging results within the past 24 hours have been reviewed.    Assessment/Plan:     * GIB (gastrointestinal bleeding)  Negative egd and colon  With old blood upstream in ileum  Video capsule 7/15 suggestive of active bleeding proximal small bowel a    Plan for a push enteroscopy versus balloon assisted enteroscopy on Monday  Trend hemoglobin          Thank you for your consult. I will follow-up with patient. Please contact us if you have any additional questions.    J Carlos Hurst MD  Gastroenterology  [facility]   Render In Strict Bullet Format?: No Detail Level: Zone Initiate Treatment: ketoconazole 2 % shampoo TIW\\nQuantity: 120.0 ml  Days Supply: 30\\nSig: Wash scalp and facial hair three times a week, leave on scalp for 5 minutes then rinse.\\n\\nhydrocortisone 2.5 % topical cream BID\\nQuantity: 30.0 g  Days Supply: 30\\nSig: Apply to affected scaly areas on face bid x 2 weeks. Then use PRN when flare ups occur.

## 2025-07-25 NOTE — PLAN OF CARE
"0655  Updates [facility] [place] Awaiting response.     0828  Message sent to [facility] informing of dc order noted, awaiting SNF orders, & requesting to be notified when ins auth has been obtained.     1120  VEENA informed Hannah ([phone]) w/[facility] of orders sent to  via [facility] & questioned ins auth status. Awaiting response.     VEENA informed the pt's daughter, Hayley Velasco ([phone]), via phone of the pt's discharge status. Hayley verbalized understanding & agreement but requested an update from the MD with a "prognosis" prior to the pt's discharge. Message sent to Dr Bautista informing of above.     1235  CM was informed by Hannah that the pt was scheduled to discharge home on 5/29/2023 prior to being hospitalized. VEENA informed Hayley of above. Hayley stated that the family wants the pt to return to [facility] & that they will pay the daily amount. CM informed Hannah of above & provided Hayley's contact information. Awaiting response.      1450  VEENA was informed by Hannah that the pt will be accepted to [facility] room 503 & requested that report be called to nurse Araiza at ([phone]).    Ambulance transportation scheduled with requested pickup at 1630. Transportation packet left at the nurse's station. "Pt Choice" form placed in the pt's chart.     Message sent to nurse Aponte & virtual nurse Mindi informing that the pt is cleared to discharge to [facility], of ambulance transportation scheduled, & requested that Fay call report.     1515  Patient resting quietly in bed when CM rounded. CM informed pt of the discharge plan & Hayley via phone of the room assignment at [facility].       Will continue to follow.   " Stop polytrim  Stop Zaditor    Start Restasis twice a day, likely needs plugs, serum tears, but would start with Restasis to see if helps. Also use artificial tears, preservative free, every hour or two. Recheck in 3 mo and patient also due for CEE. RBA discussed.   If too expensive, okay to substitute Vevye.

## (undated) DEVICE — PACK COMPREHENSIVE PROCEDURE

## (undated) DEVICE — SEE MEDLINE ITEM 157131

## (undated) DEVICE — COVER PROBE US 5.5X58L NON LTX

## (undated) DEVICE — PACK RF ABLATION PROCEDURE

## (undated) DEVICE — GLOVE BIOGEL SKINSENSE PI 7.0

## (undated) DEVICE — KIT VENASEAL CLOSURE SYSTEM

## (undated) DEVICE — SUT 3-0 VICRYL / RB-1

## (undated) DEVICE — SOL 9P NACL IRR PIC IL

## (undated) DEVICE — SUT MONOCRYL 4-0 PS-2

## (undated) DEVICE — SUT ETHILON 4-0 PS2 18 BLK

## (undated) DEVICE — COVER BAND BAG 40 X 40

## (undated) DEVICE — SUT 2-0 VICRYL / SH (J417)

## (undated) DEVICE — KIT MICRO INTRODUCER 4F .018IN

## (undated) DEVICE — SUT ETHILON 3-0 PS2 18 BLK

## (undated) DEVICE — WIRE MANDRIL 98/60CM

## (undated) DEVICE — DRESSING N ADH OIL EMUL 3X3

## (undated) DEVICE — SEE MEDLINE ITEM 152515

## (undated) DEVICE — PACK ADMIN VARITHENA UNIVERSAL